# Patient Record
Sex: FEMALE | Race: WHITE | Employment: OTHER | URBAN - METROPOLITAN AREA
[De-identification: names, ages, dates, MRNs, and addresses within clinical notes are randomized per-mention and may not be internally consistent; named-entity substitution may affect disease eponyms.]

---

## 2017-01-16 ENCOUNTER — OFFICE VISIT (OUTPATIENT)
Dept: INTERNAL MEDICINE CLINIC | Age: 57
End: 2017-01-16

## 2017-01-16 VITALS
TEMPERATURE: 97.3 F | HEIGHT: 69 IN | SYSTOLIC BLOOD PRESSURE: 116 MMHG | HEART RATE: 72 BPM | OXYGEN SATURATION: 97 % | RESPIRATION RATE: 14 BRPM | DIASTOLIC BLOOD PRESSURE: 51 MMHG | WEIGHT: 261 LBS | BODY MASS INDEX: 38.66 KG/M2

## 2017-01-16 DIAGNOSIS — I10 ESSENTIAL HYPERTENSION WITH GOAL BLOOD PRESSURE LESS THAN 140/90: ICD-10-CM

## 2017-01-16 DIAGNOSIS — N18.9 ANEMIA IN CHRONIC KIDNEY DISEASE: ICD-10-CM

## 2017-01-16 DIAGNOSIS — E78.2 MIXED HYPERLIPIDEMIA: ICD-10-CM

## 2017-01-16 DIAGNOSIS — I49.5 SICK SINUS SYNDROME (HCC): ICD-10-CM

## 2017-01-16 DIAGNOSIS — N18.4 CKD (CHRONIC KIDNEY DISEASE), STAGE IV (HCC): ICD-10-CM

## 2017-01-16 DIAGNOSIS — I48.0 PAF (PAROXYSMAL ATRIAL FIBRILLATION) (HCC): ICD-10-CM

## 2017-01-16 DIAGNOSIS — D63.1 ANEMIA IN CHRONIC KIDNEY DISEASE: ICD-10-CM

## 2017-01-16 LAB
HBA1C MFR BLD HPLC: 7.7 % (ref 4.8–5.6)
INR BLD: 2.2 (ref 1–1.5)
PT POC: 26.5 SEC (ref 9.1–12)
VALID INTERNAL CONTROL?: YES

## 2017-01-16 NOTE — PROGRESS NOTES
HISTORY OF PRESENT ILLNESS  Radha Woods is a 61 y.o. female. HPI  DIABETES MELLITUS  She presents for follow up of diabetes mellitus complicated by retinopathy, neuropathy, and nephropathy, hypertension, hyperlipidemia, obesity, and sick sinus syndrome with pacemaker. She has not had follow-up with her nephrologist for almost a year. She hesitates to go back because lab work that he does is not paid for by insurance. Apparently part of the problem is that it duplicates lab work recently done by  [facility]. She has not been compliant with monthly pro time checks either. Diabetic ROS - medication compliance: compliant all of the time,      home glucose monitoring: Not checked very often, nonfasting values range <200,      further diabetic ROS: no polyuria or polydipsia, no numbness, tingling or pain in extremities, no hypoglycemia, no medication side effects noted, cannot feel feet, blurry vision in right eye due to a bleed. .   Cardiovascular ROS:  She complains of lower extremity edema. She denies palpitations, orthopnea, exertional chest pressure/discomfort, claudication, dyspnea on exertion, dizziness  Diet and Lifestyle: generally follows a low fat low cholesterol diet, generally follows a low sodium diet, follows a diabetic diet regularly, sedentary, nonsmoker  Home BP Monitoring: is not measured at home.     Patient Active Problem List   Diagnosis Code    Breast ca: f/b  [facility] C50.919    Asthma J45.909    Bleeding disorder ([facility] D68.9    Fe deficiency anemia D50.9    Atrial fibrillation, currently in sinus rhythm ([facility]) I48.91    Status post ablation of atrial fibrillation Z98.890, Z86.79    Morbid obesity ([facility]) E66.01    Sick sinus syndrome ([facility]) I49.5    S/P cardiac pacemaker procedure Z95.0    Long term (current) use of anticoagulants Z79.01    Mixed hyperlipidemia E78.2    Proliferative diabetic retinopathy ([facility] E11.3599    CKD (chronic kidney disease), stage IV ([facility]) N18.4    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 (HCC) R58.69    Type II diabetes mellitus with renal manifestations (HCC) Q15.75    Systolic murmur N21.8    CHOW (dyspnea on exertion) R06.09    Essential hypertension with goal blood pressure less than 140/90 I10    PAF (paroxysmal atrial fibrillation) ([facility]) I48.0    Anemia D64.9    Anemia in chronic kidney disease N18.9, D63.1     Past Medical History   Diagnosis Date    Acquired lymphedema      Right arm    Arrhythmia     Asthma     Atrial fibrillation (HCC)      Dr. Joshua Drew and Dr. Oziel Lundy Atrial flutter ([facility]     Cancer [facility])      bilateral breast    Chronic kidney disease     Diabetes mellitus type II     Dizziness     Essential hypertension     Hyperlipidemia     Hypertension     Long term (current) use of anticoagulants     [facility] 3/14/2012    Osteoarthritis     Pacemaker     Sick sinus syndrome [facility])      Past Surgical History   Procedure Laterality Date    Pr laminectomy,cervical  1994    Pr gastric bypass,obese<150cm emma-en-y  7/08     [facility]    Pr anesth,knee area surgery  1982 AND 1994    Hx cervical fusion  1994    Hx dilation and curettage  1992    Hx carpal tunnel release      Hx mastectomy  1998     RIGHT MODIFIED RADICAL     Ir insert tunl cvc w port over 5 years      Pr laser surgery of eye      Pr needle biopsy liver,w othr proc  8.21.07    Us guide asp ovarian cyst abd approach  8.21.07     RIGHT     Hx afib ablation      Hx pacemaker      Hx mohs procedure  1995     right    Hx orthopaedic       ight knee surgery    Pr abdomen surgery proc unlisted       gastric bypass     Social History     Social History    Marital status:      Spouse name: N/A    Number of children: N/A    Years of education: N/A     Social History Main Topics    Smoking status: Never Smoker    Smokeless tobacco: Never Used    Alcohol use Yes      Comment: rare    Drug use: No    Sexual activity: Not Asked     Other Topics Concern    None     Social History Narrative     Family History   Problem Relation Age of Onset    Cancer Mother     Heart Disease Mother     Alcohol abuse Father     Diabetes Brother     Hypertension Brother      Allergies   Allergen Reactions    Ace Inhibitors Cough    Amlodipine Swelling    Avapro [Irbesartan] Unable to Obtain    Bactrim [Sulfamethoxazole-Trimethoprim] Other (comments)     Sore mouth    Captopril Cough    Carvedilol Diarrhea    Contrast Agent [Iodine] Itching    Fish Containing Products Hives    Morphine Nausea and Vomiting and Swelling    Penicillins Hives    Pravachol [Pravastatin] Nausea Only    Seafood [Shellfish Containing Products] Hives    Singulair [Montelukast] Other (comments)     palpitations     Current Outpatient Prescriptions   Medication Sig Dispense Refill    warfarin (COUMADIN) 4 mg tablet TAKE ONE TABLET BY MOUTH DAILY ON FRIDAYS AND SUNDAYS, AND TAKE ONE-HALF TABLET DAILY ON ALL REMAINING DAYS OF THE WEEK 30 Tab 6    potassium chloride SR (KLOR-CON 10) 10 mEq tablet TAKE ONE TABLET BY MOUTH DAILY 30 Tab 4    doxazosin (CARDURA) 2 mg tablet TAKE ONE TABLET BY MOUTH ONCE NIGHTLY 30 Tab 9    bumetanide (BUMEX) 1 mg tablet TAKE ONE TABLET BY MOUTH TWICE A DAY (GENERIC FOR BUMEX) 60 Tab 6    cloNIDine HCl (CATAPRES) 0.3 mg tablet TAKE ONE TABLET BY MOUTH TWICE A DAY 60 Tab 10    Insulin Syringe-Needle U-100 1/2 mL 30 gauge x 5/16 syrg USE ONE SYRINGE AS NEEDED DAILY 100 Syringe 2    insulin glargine (LANTUS) 100 unit/mL injection Inject 15 units under the skin once daily 10 mL 11    metoprolol succinate (TOPROL-XL) 100 mg tablet Take 2 Tabs by mouth daily. 60 Tab 5    hydrALAZINE (APRESOLINE) 50 mg tablet TAKE ONE TABLET BY MOUTH THREE TIMES A DAY 90 Tab 5    metolazone (ZAROXOLYN) 5 mg tablet Take 1 Tab by mouth daily as needed (take if develop increased LE edema, weight gain > 5 pounds. ).  30 Tab 1    cholecalciferol, VITAMIN D3, (VITAMIN D3) 5,000 unit tab tablet Take 5,000 Units by mouth daily.  sodium bicarbonate 650 mg tablet Take 650 mg by mouth three (3) times daily.  vitamin A 8,000 unit capsule Take 8,000 Units by mouth daily.  ACETAMINOPHEN/DIPHENHYDRAMINE (TYLENOL PM PO) Take 2 Tabs by mouth nightly as needed.  insulin lispro (HUMALOG) 100 unit/mL kwikpen 2 units with dinner for sugars over 200 1 Package 0    Insulin Needles, Disposable, (NOVOFINE 30) 30 x 1/3 \" Use 1 needle daily for injections. 1 Package 11    cyanocobalamin (VITAMIN B-12) 1,000 mcg sublingual tablet Take 1,000 mcg by mouth daily.  calcium carbonate (TUMS) 200 mg calcium (500 mg) Chew Take 1 Tab by mouth three (3) times daily (after meals).  flintstones complete (FLINTSTONES COMPLETE) chewable tablet Take 1 Tab by mouth daily. Review of Systems   Constitutional: Positive for malaise/fatigue. Negative for weight loss. Gastrointestinal: Negative for constipation, diarrhea and heartburn. Musculoskeletal: Positive for joint pain (knees). Negative for back pain. Neurological: Positive for sensory change (feet). Negative for dizziness, tingling and focal weakness. Visit Vitals    /51 (BP 1 Location: Left arm, BP Patient Position: Sitting)    Pulse 72    Temp 97.3 °F (36.3 °C) (Oral)    Resp 14    Ht 5' 9\" (1.753 m)    Wt 261 lb (118.4 kg)    SpO2 97%    BMI 38.54 kg/m2     Physical Exam   Constitutional: She is oriented to person, place, and time. She appears well-developed and well-nourished. HENT:   Head: Normocephalic and atraumatic. Eyes: Conjunctivae are normal. Pupils are equal, round, and reactive to light. Neck: Neck supple. Carotid bruit is not present. No thyromegaly present. Cardiovascular: Normal rate, regular rhythm and normal heart sounds. PMI is not displaced. Exam reveals no gallop. No murmur heard.   Pulses:       Dorsalis pedis pulses are 2+ on the right side, and 2+ on the left side. Posterior tibial pulses are 2+ on the right side, and 2+ on the left side. Pulmonary/Chest: Effort normal. She has no wheezes. She has no rhonchi. She has no rales. Abdominal: Soft. Normal appearance. She exhibits no abdominal bruit and no mass. There is no hepatosplenomegaly. There is no tenderness. Musculoskeletal: She exhibits no edema. Lymphadenopathy:     She has no cervical adenopathy. Right: No supraclavicular adenopathy present. Left: No inguinal and no supraclavicular adenopathy present. Neurological: She is alert and oriented to person, place, and time. No sensory deficit. Skin: Skin is warm, dry and intact. No rash noted. Psychiatric: She has a normal mood and affect. Her behavior is normal.   Nursing note and vitals reviewed. Results for orders placed or performed in visit on 01/16/17   AMB POC HEMOGLOBIN A1C   Result Value Ref Range    Hemoglobin A1c (POC) 7.7 (A) 4.8 - 5.6 %   AMB POC PT/INR   Result Value Ref Range    VALID INTERNAL CONTROL POC Yes     Prothrombin time (POC) 26.5 (A) 9.1 - 12 sec    INR POC 2.2 (A) 1 - 1.5     Lab Results   Component Value Date/Time    Cholesterol, total 151 08/26/2016 11:26 AM    HDL Cholesterol 31 08/26/2016 11:26 AM    LDL, calculated 84 08/26/2016 11:26 AM    VLDL, calculated 36 08/26/2016 11:26 AM    Triglyceride 178 08/26/2016 11:26 AM       ASSESSMENT and PLAN    ICD-10-CM ICD-9-CM    1. Uncontrolled type 2 diabetes mellitus with stage 4 chronic kidney disease, with long-term current use of insulin ([facility]) E11.22 250.52 AMB POC HEMOGLOBIN A1C    E11.65 585.4     N18.4 V58.67     Z79.4     2. Essential hypertension with goal blood pressure less than 140/90 I10 401.9    3.  PAF (paroxysmal atrial fibrillation) ([facility]) I48.0 427.31 AMB POC PT/INR      PROTHROMBIN TIME + INR   4. CKD (chronic kidney disease), stage IV ([facility]) J35.1 850.7 METABOLIC PANEL, COMPREHENSIVE      CBC WITH AUTOMATED DIFF   5. Sick sinus syndrome (HCC) I49.5 427.81    6. Mixed hyperlipidemia Q76.6 819.7 METABOLIC PANEL, COMPREHENSIVE   7. Anemia in chronic kidney disease N18.9 285.21     D63.1 585.9          Uncontrolled type 2 diabetes mellitus with stage 4 chronic kidney disease, with long-term current use of insulin ([facility])  -     AMB POC HEMOGLOBIN A1C    Essential hypertension with goal blood pressure less than 140/90  Hypertension is controlled    PAF (paroxysmal atrial fibrillation) ([facility])  -     AMB POC PT/INR  -     PROTHROMBIN TIME + INR; Future    CKD (chronic kidney disease), stage IV ([facility])  -     METABOLIC PANEL, COMPREHENSIVE; Future  -     CBC WITH AUTOMATED DIFF; Future    Sick sinus syndrome ([facility]    Mixed hyperlipidemia  Hyperlipidemia is controlled  -     METABOLIC PANEL, COMPREHENSIVE; Future    Anemia in chronic kidney disease      Follow-up Disposition:  Return in about 4 months (around 5/16/2017) for DM, Chol, HTN   NON-fasting lab in one month (not on Friday). lab results and schedule of future lab studies reviewed with patient  reviewed diet, exercise and weight control  cardiovascular risk and specific lipid/LDL goals reviewed  Discussed the patient's above normal BMI with her. I have recommended the following interventions: encourage exercise  lifestyle education regarding diet . The BMI follow up plan is as follows: BMI is out of normal parameters and plan is as follows: I have counseled her on diet and exercise regimens  I have discussed the diagnosis with the patient and the intended plan as seen in the above orders. Patient is in agreement. The patient has received an after-visit summary and questions were answered concerning future plans. I have discussed medication side effects and warnings with the patient as well.

## 2017-01-16 NOTE — PATIENT INSTRUCTIONS
NON-fasting lab work in one month. Office visit in 4 months        Diabetes Foot Health: Care Instructions  Your Care Instructions    When you have diabetes, your feet need extra care and attention. Diabetes can damage the nerve endings and blood vessels in your feet, making you less likely to notice when your feet are injured. Diabetes also limits your body's ability to fight infection and get blood to areas that need it. If you get a minor foot injury, it could become an ulcer or a serious infection. With good foot care, you can prevent most of these problems. Caring for your feet can be quick and easy. Most of the care can be done when you are bathing or getting ready for bed. Follow-up care is a key part of your treatment and safety. Be sure to make and go to all appointments, and call your doctor if you are having problems. Its also a good idea to know your test results and keep a list of the medicines you take. How can you care for yourself at home? · Keep your blood sugar close to normal by watching what and how much you eat, monitoring blood sugar, taking medicines if prescribed, and getting regular exercise. · Do not smoke. Smoking affects blood flow and can make foot problems worse. If you need help quitting, talk to your doctor about stop-smoking programs and medicines. These can increase your chances of quitting for good. · Eat a diet that is low in fats. High fat intake can cause fat to build up in your blood vessels and decrease blood flow. · Inspect your feet daily for blisters, cuts, cracks, or sores. If you cannot see well, use a mirror or have someone help you. · Take care of your feet:  [facility] your feet every day. Use warm (not hot) water. Check the water temperature with your wrists or other part of your body, not your feet. ¨ Dry your feet well. Pat them dry. Do not rub the skin on your feet too hard. Dry well between your toes.  If the skin on your feet stays moist, bacteria or a fungus can grow, which can lead to infection. ¨ Keep your skin soft. Use moisturizing skin cream to keep the skin on your feet soft and prevent calluses and cracks. But do not put the cream between your toes, and stop using any cream that causes a rash. ¨ Clean underneath your toenails carefully. Do not use a sharp object to clean underneath your toenails. Use the blunt end of a nail file or other rounded tool. ¨ Trim and file your toenails straight across to prevent ingrown toenails. Use a nail clipper, not scissors. Use an emery board to smooth the edges. · Change socks daily. Socks without seams are best, because seams often rub the feet. You can find socks for people with diabetes from specialty catalogs. · Look inside your shoes every day for things like gravel or torn linings, which could cause blisters or sores. · Buy shoes that fit well:  ¨ Look for shoes that have plenty of space around the toes. This helps prevent bunions and blisters. ¨ Try on shoes while wearing the kind of socks you will usually wear with the shoes. ¨ Avoid plastic shoes. They may rub your feet and cause blisters. Good shoes should be made of materials that are flexible and breathable, such as leather or cloth. ¨ Break in new shoes slowly by wearing them for no more than an hour a day for several days. Take extra time to check your feet for red areas, blisters, or other problems after you wear new shoes. · Do not go barefoot. Do not wear sandals, and do not wear shoes with very thin soles. Thin soles are easy to puncture. They also do not protect your feet from hot pavement or cold weather. · Have your doctor check your feet during each visit. If you have a foot problem, see your doctor. Do not try to treat an early foot problem at home. Home remedies or treatments that you can buy without a prescription (such as corn removers) can be harmful. · Always get early treatment for foot problems.  A minor irritation can lead to a major problem if not properly cared for early. When should you call for help? Call your doctor now or seek immediate medical care if:  · You have a foot sore, an ulcer or break in the skin that is not healing after 4 days, bleeding corns or calluses, or an ingrown toenail. · You have blue or black areas, which can mean bruising or blood flow problems. · You have peeling skin or tiny blisters between your toes or cracking or oozing of the skin. · You have a fever for more than 24 hours and a foot sore. · You have new numbness or tingling in your feet that does not go away after you move your feet or change positions. · You have unexplained or unusual swelling of the foot or ankle. Watch closely for changes in your health, and be sure to contact your doctor if:  · You cannot do proper foot care. Where can you learn more? Go to http://dona-windy.info/. Enter A739 in the search box to learn more about \"Diabetes Foot Health: Care Instructions. \"  Current as of: May 23, 2016  Content Version: 11.1  © 7340-4766 [employer]. Care instructions adapted under license by [employer] (which disclaims liability or warranty for this information). If you have questions about a medical condition or this instruction, always ask your healthcare professional. [address] any warranty or liability for your use of this information.

## 2017-01-16 NOTE — PROGRESS NOTES
Reviewed record In preparation for visit and have obtained necessary documentation. Has info on advanced directive but has not filled them out. 1. Have you been to the ER, urgent care clinic or hospitalized since your last visit? No     2. Have you seen or consulted any other health care providers outside of the [facility] since your last visit? Include any pap smears or colon screening.  [facility]    Health Maintenance reviewed:

## 2017-01-16 NOTE — MR AVS SNAPSHOT
Visit Information Date & Time Provider Department Dept. Phone Encounter #  
 1/16/2017 10:30 AM Sunitha Quiñonez MD [facility] [phone] [id number] Follow-up Instructions Return in about 4 months (around 5/16/2017) for DM, Chol, HTN   NON-fasting lab in one month (not on Friday). Upcoming Health Maintenance Date Due Pneumococcal 19-64 Highest Risk (2 of 3 - PCV13) 11/11/2025* FOOT EXAM Q1 6/2/2017 HEMOGLOBIN A1C Q6M 7/16/2017 MICROALBUMIN Q1 7/19/2017 LIPID PANEL Q1 8/26/2017 PAP AKA CERVICAL CYTOLOGY 11/17/2017 EYE EXAM RETINAL OR DILATED Q1 12/27/2017 COLONOSCOPY 4/21/2020 DTaP/Tdap/Td series (2 - Td) 6/13/2026 *Topic was postponed. The date shown is not the original due date. Allergies as of 1/16/2017  Review Complete On: 1/16/2017 By: Sunitha Quiñonez MD  
  
 Severity Noted Reaction Type Reactions Ace Inhibitors  03/14/2012    Cough Amlodipine  01/02/2017    Swelling Avapro [Irbesartan]  03/14/2012    Unable to Obtain Bactrim [Sulfamethoxazole-trimethoprim]  04/13/2010    Other (comments) Sore mouth Captopril  04/13/2010    Cough Carvedilol  07/19/2016   Side Effect Diarrhea Contrast Agent [Iodine]  04/13/2010    Itching Fish Containing Products  10/29/2013    Hives Morphine  04/13/2010    Nausea and Vomiting, Swelling Penicillins  04/13/2010    Hives Pravachol [Pravastatin]  04/13/2010    Nausea Only Seafood [Shellfish Containing Products]  03/14/2012    Hives Singulair [Montelukast]  04/13/2010    Other (comments)  
 palpitations Current Immunizations  Reviewed on 1/16/2017 Name Date Influenza Vaccine 10/1/2016, 10/1/2015, 10/5/2014 Influenza Vaccine PF 11/1/2013  8:55 AM  
 Influenza Vaccine Split 11/7/2011  1:58 PM, 11/30/2010 Pneumococcal Vaccine (Unspecified Type) 10/10/2009 Tdap 6/13/2016 Reviewed by Franklin Smith LPN on 9/65/0520 at 42:55 AM  
You Were Diagnosed With   
  
 Codes Comments Type 2 diabetes mellitus with diabetic chronic kidney disease, unspecified CKD stage, unspecified long term insulin use status ([facility]    -  Primary ICD-10-CM: E11.22 
ICD-9-CM: 250.40, 585.9 Essential hypertension with goal blood pressure less than 140/90     ICD-10-CM: I10 
ICD-9-CM: 401.9 PAF (paroxysmal atrial fibrillation) (HCC)     ICD-10-CM: I48.0 ICD-9-CM: 427.31 CKD (chronic kidney disease), stage IV ([facility]     ICD-10-CM: N18.4 ICD-9-CM: 585.4 Sick sinus syndrome (HCC)     ICD-10-CM: I49.5 ICD-9-CM: 427.81 Mixed hyperlipidemia     ICD-10-CM: E78.2 ICD-9-CM: 272.2 Vitals BP Pulse Temp Resp Height(growth percentile) Weight(growth percentile) 116/51 (BP 1 Location: Left arm, BP Patient Position: Sitting) 72 97.3 °F (36.3 °C) (Oral) 14 5' 9\" (1.753 m) 261 lb (118.4 kg) SpO2 BMI OB Status Smoking Status 97% 38.54 kg/m2 Postmenopausal Never Smoker BMI and BSA Data Body Mass Index Body Surface Area 38.54 kg/m 2 2.4 m 2 Preferred Pharmacy Pharmacy Name Phone [facility] [address] , [address]  [phone] Your Updated Medication List  
  
   
This list is accurate as of: 1/16/17 11:29 AM.  Always use your most recent med list.  
  
  
  
  
 bumetanide 1 mg tablet Commonly known as:  Mary Jane Soha TAKE ONE TABLET BY MOUTH TWICE A DAY (GENERIC FOR BUMEX)  
  
 calcium carbonate 200 mg calcium (500 mg) Chew Commonly known as:  TUMS Take 1 Tab by mouth three (3) times daily (after meals). cholecalciferol (VITAMIN D3) 5,000 unit Tab tablet Commonly known as:  VITAMIN D3 Take 5,000 Units by mouth daily. cloNIDine HCl 0.3 mg tablet Commonly known as:  CATAPRES  
TAKE ONE TABLET BY MOUTH TWICE A DAY  
  
 cyanocobalamin 1,000 mcg sublingual tablet Commonly known as:  VITAMIN B-12  
 Take 1,000 mcg by mouth daily. doxazosin 2 mg tablet Commonly known as:  CARDURA TAKE ONE TABLET BY MOUTH ONCE NIGHTLY FLINTSTONES COMPLETE (IRON) chewable tablet Generic drug:  flintstones complete Take 1 Tab by mouth daily. hydrALAZINE 50 mg tablet Commonly known as:  APRESOLINE  
TAKE ONE TABLET BY MOUTH THREE TIMES A DAY  
  
 insulin glargine 100 unit/mL injection Commonly known as:  LANTUS Inject 15 units under the skin once daily  
  
 insulin lispro 100 unit/mL kwikpen Commonly known as:  HUMALOG  
2 units with dinner for sugars over 200 Insulin Needles (Disposable) 30 gauge x 1/3\" Commonly known as:  NOVOFINE 30  
Use 1 needle daily for injections. Insulin Syringe-Needle U-100 1/2 mL 30 gauge x 5/16 Syrg USE ONE SYRINGE AS NEEDED DAILY  
  
 metOLazone 5 mg tablet Commonly known as:  Rocio Jerson Take 1 Tab by mouth daily as needed (take if develop increased LE edema, weight gain > 5 pounds. ). metoprolol succinate 100 mg tablet Commonly known as:  TOPROL-XL Take 2 Tabs by mouth daily. potassium chloride SR 10 mEq tablet Commonly known as:  KLOR-CON 10  
TAKE ONE TABLET BY MOUTH DAILY  
  
 sodium bicarbonate 650 mg tablet Take 650 mg by mouth three (3) times daily. TYLENOL PM PO Take 2 Tabs by mouth nightly as needed. vitamin A 8,000 unit capsule Take 8,000 Units by mouth daily. warfarin 4 mg tablet Commonly known as:  COUMADIN  
TAKE ONE TABLET BY MOUTH DAILY ON FRIDAYS AND SUNDAYS, AND TAKE ONE-HALF TABLET DAILY ON ALL REMAINING DAYS OF THE WEEK We Performed the Following AMB POC HEMOGLOBIN A1C [30294 CPT(R)] AMB POC PT/INR [22021 CPT(R)] Follow-up Instructions Return in about 4 months (around 5/16/2017) for DM, Chol, HTN   NON-fasting lab in one month (not on Friday). To-Do List   
 02/16/2017 Lab:  CBC WITH AUTOMATED DIFF   
  
 02/16/2017 Lab: METABOLIC PANEL, COMPREHENSIVE   
  
 02/16/2017 Lab:  PROTHROMBIN TIME + INR Patient Instructions NON-fasting lab work in one month. Office visit in 4 months Diabetes Foot Health: Care Instructions Your Care Instructions When you have diabetes, your feet need extra care and attention. Diabetes can damage the nerve endings and blood vessels in your feet, making you less likely to notice when your feet are injured. Diabetes also limits your body's ability to fight infection and get blood to areas that need it. If you get a minor foot injury, it could become an ulcer or a serious infection. With good foot care, you can prevent most of these problems. Caring for your feet can be quick and easy. Most of the care can be done when you are bathing or getting ready for bed. Follow-up care is a key part of your treatment and safety. Be sure to make and go to all appointments, and call your doctor if you are having problems. Its also a good idea to know your test results and keep a list of the medicines you take. How can you care for yourself at home? · Keep your blood sugar close to normal by watching what and how much you eat, monitoring blood sugar, taking medicines if prescribed, and getting regular exercise. · Do not smoke. Smoking affects blood flow and can make foot problems worse. If you need help quitting, talk to your doctor about stop-smoking programs and medicines. These can increase your chances of quitting for good. · Eat a diet that is low in fats. High fat intake can cause fat to build up in your blood vessels and decrease blood flow. · Inspect your feet daily for blisters, cuts, cracks, or sores. If you cannot see well, use a mirror or have someone help you. · Take care of your feet: 
[facility] AUTHOR[facility] your feet every day. Use warm (not hot) water. Check the water temperature with your wrists or other part of your body, not your feet. ¨ Dry your feet well. Pat them dry. Do not rub the skin on your feet too hard. Dry well between your toes. If the skin on your feet stays moist, bacteria or a fungus can grow, which can lead to infection. ¨ Keep your skin soft. Use moisturizing skin cream to keep the skin on your feet soft and prevent calluses and cracks. But do not put the cream between your toes, and stop using any cream that causes a rash. ¨ Clean underneath your toenails carefully. Do not use a sharp object to clean underneath your toenails. Use the blunt end of a nail file or other rounded tool. ¨ Trim and file your toenails straight across to prevent ingrown toenails. Use a nail clipper, not scissors. Use an emery board to smooth the edges. · Change socks daily. Socks without seams are best, because seams often rub the feet. You can find socks for people with diabetes from specialty catalogs. · Look inside your shoes every day for things like gravel or torn linings, which could cause blisters or sores. · Buy shoes that fit well: 
¨ Look for shoes that have plenty of space around the toes. This helps prevent bunions and blisters. ¨ Try on shoes while wearing the kind of socks you will usually wear with the shoes. ¨ Avoid plastic shoes. They may rub your feet and cause blisters. Good shoes should be made of materials that are flexible and breathable, such as leather or cloth. ¨ Break in new shoes slowly by wearing them for no more than an hour a day for several days. Take extra time to check your feet for red areas, blisters, or other problems after you wear new shoes. · Do not go barefoot. Do not wear sandals, and do not wear shoes with very thin soles. Thin soles are easy to puncture. They also do not protect your feet from hot pavement or cold weather. · Have your doctor check your feet during each visit. If you have a foot problem, see your doctor.  Do not try to treat an early foot problem at home. Home remedies or treatments that you can buy without a prescription (such as corn removers) can be harmful. · Always get early treatment for foot problems. A minor irritation can lead to a major problem if not properly cared for early. When should you call for help? Call your doctor now or seek immediate medical care if: 
· You have a foot sore, an ulcer or break in the skin that is not healing after 4 days, bleeding corns or calluses, or an ingrown toenail. · You have blue or black areas, which can mean bruising or blood flow problems. · You have peeling skin or tiny blisters between your toes or cracking or oozing of the skin. · You have a fever for more than 24 hours and a foot sore. · You have new numbness or tingling in your feet that does not go away after you move your feet or change positions. · You have unexplained or unusual swelling of the foot or ankle. Watch closely for changes in your health, and be sure to contact your doctor if: 
· You cannot do proper foot care. Where can you learn more? Go to http://dona-windy.info/. Enter A739 in the search box to learn more about \"Diabetes Foot Health: Care Instructions. \" Current as of: May 23, 2016 Content Version: 11.1 © 5582-3924 [employer]. Care instructions adapted under license by [employer] (which disclaims liability or warranty for this information). If you have questions about a medical condition or this instruction, always ask your healthcare professional. [place] [address] any warranty or liability for your use of this information. Introducing [facility]! Salvador Guidry introduces [facility] patient portal. Now you can access parts of your medical record, email your doctor's office, and request medication refills online. 1. In your internet browser, go to [email or web address] 2. Click on the First Time User? Click Here link in the Sign In box. You will see the New Member Sign Up page. 3. Enter your Swatchcloud Access Code exactly as it appears below. You will not need to use this code after youve completed the sign-up process. If you do not sign up before the expiration date, you must request a new code. · Swatchcloud Access Code: [email or web address] Expires: 2/26/2017 10:13 AM 
 
4. Enter the last four digits of your Social Security Number (xxxx) and Date of Birth (mm/dd/yyyy) as indicated and click Submit. You will be taken to the next sign-up page. 5. Create a Swatchcloud ID. This will be your Swatchcloud login ID and cannot be changed, so think of one that is secure and easy to remember. 6. Create a Swatchcloud password. You can change your password at any time. 7. Enter your Password Reset Question and Answer. This can be used at a later time if you forget your password. 8. Enter your e-mail address. You will receive e-mail notification when new information is available in [address] 9. Click Sign Up. You can now view and download portions of your medical record. 10. Click the Download Summary menu link to download a portable copy of your medical information. If you have questions, please visit the Frequently Asked Questions section of the Swatchcloud website. Remember, Swatchcloud is NOT to be used for urgent needs. For medical emergencies, dial 911. Now available from your iPhone and Android! Please provide this summary of care documentation to your next provider. Your primary care clinician is listed as Pierce Delarosa. If you have any questions after today's visit, please call [phone].

## 2017-03-30 ENCOUNTER — TELEPHONE (OUTPATIENT)
Dept: INTERNAL MEDICINE CLINIC | Age: 57
End: 2017-03-30

## 2017-03-30 ENCOUNTER — OFFICE VISIT (OUTPATIENT)
Dept: INTERNAL MEDICINE CLINIC | Age: 57
End: 2017-03-30

## 2017-03-30 VITALS
OXYGEN SATURATION: 97 % | TEMPERATURE: 97.5 F | WEIGHT: 266 LBS | DIASTOLIC BLOOD PRESSURE: 99 MMHG | SYSTOLIC BLOOD PRESSURE: 200 MMHG | RESPIRATION RATE: 14 BRPM | HEART RATE: 66 BPM | BODY MASS INDEX: 39.4 KG/M2 | HEIGHT: 69 IN

## 2017-03-30 DIAGNOSIS — F41.1 ANXIETY AS ACUTE REACTION TO GROSS STRESS: Primary | ICD-10-CM

## 2017-03-30 DIAGNOSIS — F43.0 ANXIETY AS ACUTE REACTION TO GROSS STRESS: Primary | ICD-10-CM

## 2017-03-30 DIAGNOSIS — I48.0 PAF (PAROXYSMAL ATRIAL FIBRILLATION) (HCC): ICD-10-CM

## 2017-03-30 DIAGNOSIS — I10 ESSENTIAL HYPERTENSION WITH GOAL BLOOD PRESSURE LESS THAN 140/90: ICD-10-CM

## 2017-03-30 LAB
INR BLD: 2.8 (ref 1–1.5)
PT POC: 34 SEC (ref 9.1–12)
VALID INTERNAL CONTROL?: YES

## 2017-03-30 RX ORDER — WARFARIN 4 MG/1
TABLET ORAL
COMMUNITY
End: 2017-05-01 | Stop reason: SDUPTHER

## 2017-03-30 RX ORDER — BUSPIRONE HYDROCHLORIDE 10 MG/1
10 TABLET ORAL 3 TIMES DAILY
Qty: 60 TAB | Refills: 3 | Status: SHIPPED | OUTPATIENT
Start: 2017-03-30 | End: 2017-08-15 | Stop reason: SDUPTHER

## 2017-03-30 RX ORDER — SERTRALINE HYDROCHLORIDE 50 MG/1
TABLET, FILM COATED ORAL
Qty: 45 TAB | Refills: 5 | Status: SHIPPED | OUTPATIENT
Start: 2017-03-30 | End: 2017-09-01 | Stop reason: SDUPTHER

## 2017-03-30 NOTE — PATIENT INSTRUCTIONS
Start buspirone 10 mg twice daily. This should work fairly quickly. Start sertraline 50 mg tablet at a half tablet daily for 5 days. Then take a whole tablet daily for the next 5 days. If tolerated, take one and half tablets daily for 5 days. Then finally, 2 tablets daily. Anxiety Disorder: Care Instructions  Your Care Instructions  Anxiety is a normal reaction to stress. Difficult situations can cause you to have symptoms such as sweaty palms and a nervous feeling. In an anxiety disorder, the symptoms are far more severe. Constant worry, muscle tension, trouble sleeping, nausea and diarrhea, and other symptoms can make normal daily activities difficult or impossible. These symptoms may occur for no reason, and they can affect your work, school, or social life. Medicines, counseling, and self-care can all help. Follow-up care is a key part of your treatment and safety. Be sure to make and go to all appointments, and call your doctor if you are having problems. It's also a good idea to know your test results and keep a list of the medicines you take. How can you care for yourself at home? · Take medicines exactly as directed. Call your doctor if you think you are having a problem with your medicine. · Go to your counseling sessions and follow-up appointments. · Recognize and accept your anxiety. Then, when you are in a situation that makes you anxious, say to yourself, \"This is not an emergency. I feel uncomfortable, but I am not in danger. I can keep going even if I feel anxious. \"  · Be kind to your body:  ¨ Relieve tension with exercise or a massage. ¨ Get enough rest.  ¨ Avoid alcohol, caffeine, nicotine, and illegal drugs. They can increase your anxiety level and cause sleep problems. ¨ Learn and do relaxation techniques. See below for more about these techniques. · Engage your mind. Get out and do something you enjoy. Go to a funny movie, or take a walk or hike. Plan your day.  Having too much or too little to do can make you anxious. · Keep a record of your symptoms. Discuss your fears with a good friend or family member, or join a support group for people with similar problems. Talking to others sometimes relieves stress. · Get involved in social groups, or volunteer to help others. Being alone sometimes makes things seem worse than they are. · Get at least 30 minutes of exercise on most days of the week to relieve stress. Walking is a good choice. You also may want to do other activities, such as running, swimming, cycling, or playing tennis or team sports. Relaxation techniques  Do relaxation exercises 10 to 20 minutes a day. You can play soothing, relaxing music while you do them, if you wish. · Tell others in your house that you are going to do your relaxation exercises. Ask them not to disturb you. · Find a comfortable place, away from all distractions and noise. · Lie down on your back, or sit with your back straight. · Focus on your breathing. Make it slow and steady. · Breathe in through your nose. Breathe out through either your nose or mouth. · Breathe deeply, filling up the area between your navel and your rib cage. Breathe so that your belly goes up and down. · Do not hold your breath. · Breathe like this for 5 to 10 minutes. Notice the feeling of calmness throughout your whole body. As you continue to breathe slowly and deeply, relax by doing the following for another 5 to 10 minutes:  · Tighten and relax each muscle group in your body. You can begin at your toes and work your way up to your head. · Imagine your muscle groups relaxing and becoming heavy. · Empty your mind of all thoughts. · Let yourself relax more and more deeply. · Become aware of the state of calmness that surrounds you. · When your relaxation time is over, you can bring yourself back to alertness by moving your fingers and toes and then your hands and feet and then stretching and moving your entire body. Sometimes people fall asleep during relaxation, but they usually wake up shortly afterward. · Always give yourself time to return to full alertness before you drive a car or do anything that might cause an accident if you are not fully alert. Never play a relaxation tape while you drive a car. When should you call for help? Call 911 anytime you think you may need emergency care. For example, call if:  · You feel you cannot stop from hurting yourself or someone else. Keep the numbers for these national suicide hotlines: 4-919-028-TALK (8-986.156.8959) and 0-470-UGCJTEM (6-619.677.1279). If you or someone you know talks about suicide or feeling hopeless, get help right away. Watch closely for changes in your health, and be sure to contact your doctor if:  · You have anxiety or fear that affects your life. · You have symptoms of anxiety that are new or different from those you had before. Where can you learn more? Go to http://dona-windy.info/. Enter P754 in the search box to learn more about \"Anxiety Disorder: Care Instructions. \"  Current as of: July 26, 2016  Content Version: 11.2  © 7896-2448 [employer]. Care instructions adapted under license by [employer] (which disclaims liability or warranty for this information). If you have questions about a medical condition or this instruction, always ask your healthcare professional. [place] [address] any warranty or liability for your use of this information.

## 2017-03-30 NOTE — LETTER
NOTIFICATION RETURN TO WORK  
 
3/30/2017 3:08 PM 
 
Ms. Migdalia Glover [facility] [id number] To Whom It May Concern: 
 
Migdalia Glover is currently under the care of [facility]. She was seen in the office today She will return to work on: Friday March 31, 2017 If there are questions or concerns please have the patient contact our office.  
 
 
 
Sincerely, 
 
 
Brian Edgar MD

## 2017-03-30 NOTE — TELEPHONE ENCOUNTER
Patient called c/o very stress with personal issues and a lot of work related stress. Patient would like to come see dr Shanel Lang for help with these issues. Patient denies depression but feels very stressed and upset. Patient reported to [facility] she was upset and ready to hurt someone. Patient verbalized she would never actually harm anyone she is just upset. Patient denies SI/HI at this time. Patient advised to arrive by 215pm for appt today.

## 2017-03-30 NOTE — PROGRESS NOTES
Reviewed record In preparation for visit and have obtained necessary documentation. Has info on advanced directive but has not filled them out. 1. Have you been to the ER, urgent care clinic or hospitalized since your last visit? No     2. Have you seen or consulted any other health care providers outside of the [address] [place] [address] since your last visit? Include any pap smears or colon screening.  Eye dr    Health Maintenance reviewed:

## 2017-03-30 NOTE — PROGRESS NOTES
HISTORY OF PRESENT ILLNESS  Yara Garcia is a 61 y.o. female. HPI   She presents for follow up of anxiety. She has been under a lot of stress at work. 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 Her superiors want it done within a month. There is not time to do this given the facilities she has available. Until it is done, she has to do more work to make up for those who have not been trained. She feels trapped. Things are not any better at home. Her , to whom she has not been close for many years, is a hoarder. He has been fired from his job and is not searching for another; she is paying all of the bills. She would like to move out, but cannot do so right now. She has no refuge or place of peace. Ongoing symptoms include: insomnia, racing thoughts, psychomotor agitation, feelings of losing control, difficulty concentrating. Patient denies: palpitations, shortness of breath, suicidal ideation.    She is also here for Protime/INR     Patient Active Problem List   Diagnosis Code    Breast ca: f/b Dr Ly Gip C50.919    Asthma J45.909    Fe deficiency anemia D50.9    Atrial fibrillation, currently in sinus rhythm ([facility]) I48.91    Status post ablation of atrial fibrillation Z98.890, Z86.79    Morbid obesity ([facility]) E66.01    Sick sinus syndrome ([facility]) I49.5    S/P cardiac pacemaker procedure Z95.0    Long term (current) use of anticoagulants Z79.01    Mixed hyperlipidemia E78.2    Proliferative diabetic retinopathy ([facility] [address] E11.3599    CKD (chronic kidney disease), stage IV ([facility]) N18.4    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 ([facility]) C14.04    Systolic murmur Q84.0    CHOW (dyspnea on exertion) R06.09    Essential hypertension with goal blood pressure less than 140/90 I10    PAF (paroxysmal atrial fibrillation) ([facility]) I48.0    Anemia D64.9    Anemia in chronic kidney disease N18.9, D63.1    Uncontrolled type 2 diabetes mellitus with stage 4 chronic kidney disease, with long-term current use of insulin (HCC) E11.22, E11.65, N18.4, Z79.4     Past Medical History:   Diagnosis Date    Acquired lymphedema     Right arm    Arrhythmia     Asthma     Atrial fibrillation (HCC)     Dr. Cherelle Gray and Dr. Taylor Muñiz Atrial flutter ([facility]     Cancer [facility])     bilateral breast    Chronic kidney disease     Diabetes mellitus type II     Dizziness     Essential hypertension     Hyperlipidemia     Hypertension     Long term (current) use of anticoagulants     Morbid obesity ([facility] [address] [address] 3/14/2012    Osteoarthritis     Pacemaker     Sick sinus syndrome ([email or web address]      Past Surgical History:   Procedure Laterality Date    ABDOMEN SURGERY PROC UNLISTED      gastric bypass    GASTRIC BYPASS,OBESE<150CM [facility]EN[facility]Y  7/08    [facility]    HX AFIB ABLATION      HX CARPAL TUNNEL RELEASE      HX CERVICAL FUSION  1994    HX DILATION AND CURETTAGE  1992    HX MASTECTOMY  1998    RIGHT MODIFIED RADICAL     HX MOHS PROCEDURES  1995    right    HX ORTHOPAEDIC      ight knee surgery    HX PACEMAKER      IR INSERT TUNL CVC W PORT OVER 5 YEARS      Romel [address] OF EYE      NEEDLE BIOPSY LIVER,W OTHR [address]  8.21.07    PA Shan 9462 AND 1994    US GUIDE ASP OVARIAN CYST ABD APPROACH  8.21.07    RIGHT      Social History     Social History    Marital status:      Spouse name: N/A    Number of children: N/A    Years of education: N/A     Social History Main Topics    Smoking status: Never Smoker    Smokeless tobacco: Never Used    Alcohol use Yes      Comment: rare    Drug use: No    Sexual activity: Not Asked     Other Topics Concern    None     Social History Narrative     Family History   Problem Relation Age of Onset    Cancer Mother     Heart Disease Mother     Alcohol abuse Father     Diabetes Brother     Hypertension Brother      Allergies Allergen Reactions    Ace Inhibitors Cough    Amlodipine Swelling    Avapro [Irbesartan] Unable to Obtain    Bactrim [Sulfamethoxazole-Trimethoprim] Other (comments)     Sore mouth    Captopril Cough    Carvedilol Diarrhea    Contrast Agent [Iodine] Itching    Fish Containing Products Hives    Morphine Nausea and Vomiting and Swelling    Penicillins Hives    Pravachol [Pravastatin] Nausea Only    Seafood [Shellfish Containing Products] Hives    Singulair [Montelukast] Other (comments)     palpitations     Current Outpatient Prescriptions   Medication Sig Dispense Refill    warfarin (COUMADIN) 4 mg tablet 1 tablet Monday, wednesdays, fridays and 0.5 all other days.  hydrALAZINE (APRESOLINE) 50 mg tablet TAKE ONE TABLET BY MOUTH THREE TIMES A DAY 90 Tab 3    metoprolol succinate (TOPROL-XL) 100 mg tablet TAKE TWO TABLETS BY MOUTH DAILY 60 Tab 5    potassium chloride SR (KLOR-CON 10) 10 mEq tablet TAKE ONE TABLET BY MOUTH DAILY 30 Tab 4    doxazosin (CARDURA) 2 mg tablet TAKE ONE TABLET BY MOUTH ONCE NIGHTLY 30 Tab 9    bumetanide (BUMEX) 1 mg tablet TAKE ONE TABLET BY MOUTH TWICE A DAY (GENERIC FOR BUMEX) 60 Tab 6    cloNIDine HCl (CATAPRES) 0.3 mg tablet TAKE ONE TABLET BY MOUTH TWICE A DAY 60 Tab 10    Insulin Syringe-Needle U-100 1/2 mL 30 gauge x 5/16 syrg USE ONE SYRINGE AS NEEDED DAILY 100 Syringe 2    insulin glargine (LANTUS) 100 unit/mL injection Inject 15 units under the skin once daily 10 mL 11    metolazone (ZAROXOLYN) 5 mg tablet Take 1 Tab by mouth daily as needed (take if develop increased LE edema, weight gain > 5 pounds. ). 30 Tab 1    cholecalciferol, VITAMIN D3, (VITAMIN D3) 5,000 unit tab tablet Take 5,000 Units by mouth daily.  vitamin A 8,000 unit capsule Take 8,000 Units by mouth daily.  ACETAMINOPHEN/DIPHENHYDRAMINE (TYLENOL PM PO) Take 2 Tabs by mouth nightly as needed.       insulin lispro (HUMALOG) 100 unit/mL kwikpen 2 units with dinner for sugars over 200 1 Package 0    Insulin Needles, Disposable, (NOVOFINE 30) 30 x 1/3 \" Use 1 needle daily for injections. 1 Package 11    cyanocobalamin (VITAMIN B-12) 1,000 mcg sublingual tablet Take 1,000 mcg by mouth daily.  calcium carbonate (TUMS) 200 mg calcium (500 mg) Chew Take 1 Tab by mouth three (3) times daily (after meals).  flintstones complete (FLINTSTONES COMPLETE) chewable tablet Take 1 Tab by mouth daily.  sodium bicarbonate 650 mg tablet Take 650 mg by mouth three (3) times daily. ROS       Visit Vitals    BP (!) 200/99 (BP 1 Location: Left arm, BP Patient Position: Sitting)    Pulse 66    Temp 97.5 °F (36.4 °C) (Oral)    Resp 14    Ht 5' 9\" (1.753 m)    Wt 266 lb (120.7 kg)    SpO2 97%    BMI 39.28 kg/m2     Physical Exam   Constitutional: She is oriented to person, place, and time. She appears well-developed and well-nourished. HENT:   Head: Normocephalic and atraumatic. Eyes: Pupils are equal, round, and reactive to light. Neck: Neck supple. Cardiovascular: Normal rate, regular rhythm and normal heart sounds. Exam reveals no gallop. No murmur heard. Pulmonary/Chest: Effort normal and breath sounds normal. She has no wheezes. She has no rales. Musculoskeletal: She exhibits no edema. Neurological: She is alert and oriented to person, place, and time. Skin: Skin is warm and dry. No erythema. Psychiatric: Her speech is normal and behavior is normal. Judgment and thought content normal. Her mood appears anxious. Cognition and memory are normal.   Nursing note and vitals reviewed. Results for orders placed or performed in visit on 03/30/17   AMB POC PT/INR   Result Value Ref Range    VALID INTERNAL CONTROL POC Yes     Prothrombin time (POC) 34.0 (A) 9.1 - 12 sec    INR POC 2.8 (A) 1 - 1.5       ASSESSMENT and PLAN    ICD-10-CM ICD-9-CM    1.  Anxiety as acute reaction to gross stress F41.1 308.0 sertraline (ZOLOFT) 50 mg tablet    F43.0  busPIRone (BUSPAR) 10 mg tablet   2. PAF (paroxysmal atrial fibrillation) ([facility]) I48.0 427.31 AMB POC PT/INR      warfarin (COUMADIN) 4 mg tablet   3. Essential hypertension                       I10             401.9      Anxiety as acute reaction to gross stress  -     sertraline (ZOLOFT) 50 mg tablet; Take one and a half tablets daily  -     busPIRone (BUSPAR) 10 mg tablet; Take 1 Tab by mouth three (3) times daily. PAF (paroxysmal atrial fibrillation) ([facility])  -     AMB POC PT/INR    Essential hypertension  Blood pressure is uncharacteristically high and probably due to anxiety and agitation. Will recheck after starts medication for anxiety. Follow-up Disposition:  Return in about 1 month (around 4/30/2017) for Anxiety. lab results and schedule of future lab studies reviewed with patient  I have discussed the diagnosis with the patient and the intended plan as seen in the above orders. Patient is in agreement. The patient has received an after-visit summary and questions were answered concerning future plans. I have discussed medication side effects and warnings with the patient as well.

## 2017-03-30 NOTE — MR AVS SNAPSHOT
Visit Information Date & Time Provider Department Dept. Phone Encounter #  
 3/30/2017  2:30 PM MD Cha Parker [phone] [id number] Follow-up Instructions Return in about 1 month (around 4/30/2017) for Anxiety. Your Appointments 5/15/2017  3:45 PM  
LAB with LAB [facility] Cha Gray) Appt Note: Non Fasting Lab ([place])/$0CP KMP 1/16/17  
 [address] [address] [phone]  
  
   
 [facility]  
  
    
 5/22/2017  3:30 PM  
ROUTINE CARE with MD Cha Parker) Appt Note: dm, chol, htn, non-fasting lab prior/$25CP KMP 1/16/17  
 [address] [facility] [address] [phone]  
  
   
 [address] Upcoming Health Maintenance Date Due Pneumococcal 19-64 Highest Risk (2 of 3 - PCV13) 11/11/2025* FOOT EXAM Q1 6/2/2017 HEMOGLOBIN A1C Q6M 7/16/2017 MICROALBUMIN Q1 7/19/2017 LIPID PANEL Q1 8/26/2017 PAP AKA CERVICAL CYTOLOGY 11/17/2017 EYE EXAM RETINAL OR DILATED Q1 1/30/2018 COLONOSCOPY 4/21/2020 DTaP/Tdap/Td series (2 - Td) 6/13/2026 *Topic was postponed. The date shown is not the original due date. Allergies as of 3/30/2017  Review Complete On: 3/30/2017 By: Juana Santos MD  
  
 Severity Noted Reaction Type Reactions Ace Inhibitors  03/14/2012    Cough Amlodipine  01/02/2017    Swelling Avapro [Irbesartan]  03/14/2012    Unable to Obtain Bactrim [Sulfamethoxazole-trimethoprim]  04/13/2010    Other (comments) Sore mouth Captopril  04/13/2010    Cough Carvedilol  07/19/2016   Side Effect Diarrhea Contrast Agent [Iodine]  04/13/2010    Itching Fish Containing Products  10/29/2013    Hives Morphine  04/13/2010    Nausea and Vomiting, Swelling Penicillins  04/13/2010    Hives Pravachol [Pravastatin]  04/13/2010    Nausea Only Seafood [Shellfish Containing Products]  03/14/2012    Hives Singulair [Montelukast]  04/13/2010    Other (comments)  
 palpitations Current Immunizations  Reviewed on 3/30/2017 Name Date Influenza Vaccine 10/1/2016, 10/1/2015, 10/5/2014 Influenza Vaccine PF 11/1/2013  8:55 AM  
 Influenza Vaccine Split 11/7/2011  1:58 PM, 11/30/2010 Pneumococcal Vaccine (Unspecified Type) 10/10/2009 Tdap 6/13/2016 Reviewed by Fernando Vu LPN on 3/43/0467 at  2:15 PM  
You Were Diagnosed With   
  
 Codes Comments Anxiety disorder, unspecified    -  Primary ICD-10-CM: F41.9 ICD-9-CM: 300.00 PAF (paroxysmal atrial fibrillation) (HCC)     ICD-10-CM: I48.0 ICD-9-CM: 427.31 Vitals BP Pulse Temp Resp Height(growth percentile) Weight(growth percentile) (!) 200/99 (BP 1 Location: Left arm, BP Patient Position: Sitting) 66 97.5 °F (36.4 °C) (Oral) 14 5' 9\" (1.753 m) 266 lb (120.7 kg) SpO2 BMI OB Status Smoking Status 97% 39.28 kg/m2 Postmenopausal Never Smoker BMI and BSA Data Body Mass Index Body Surface Area  
 39.28 kg/m 2 2.42 m 2 Preferred Pharmacy Pharmacy Name Phone [facility] [address], [address]  [phone] Your Updated Medication List  
  
   
This list is accurate as of: 3/30/17  3:15 PM.  Always use your most recent med list.  
  
  
  
  
 bumetanide 1 mg tablet Commonly known as:  Fawn Sierras TAKE ONE TABLET BY MOUTH TWICE A DAY (GENERIC FOR Fawn Sierras) busPIRone 10 mg tablet Commonly known as:  BUSPAR Take 1 Tab by mouth three (3) times daily. calcium carbonate 200 mg calcium (500 mg) Chew Commonly known as:  TUMS Take 1 Tab by mouth three (3) times daily (after meals). cholecalciferol (VITAMIN D3) 5,000 unit Tab tablet Commonly known as:  VITAMIN D3  
 Take 5,000 Units by mouth daily. cloNIDine HCl 0.3 mg tablet Commonly known as:  CATAPRES  
TAKE ONE TABLET BY MOUTH TWICE A DAY  
  
 cyanocobalamin 1,000 mcg sublingual tablet Commonly known as:  VITAMIN B-12 Take 1,000 mcg by mouth daily. doxazosin 2 mg tablet Commonly known as:  CARDURA TAKE ONE TABLET BY MOUTH ONCE NIGHTLY FLINTSTONES COMPLETE (IRON) chewable tablet Generic drug:  flintstones complete Take 1 Tab by mouth daily. hydrALAZINE 50 mg tablet Commonly known as:  APRESOLINE  
TAKE ONE TABLET BY MOUTH THREE TIMES A DAY  
  
 insulin glargine 100 unit/mL injection Commonly known as:  LANTUS Inject 15 units under the skin once daily  
  
 insulin lispro 100 unit/mL kwikpen Commonly known as:  HUMALOG  
2 units with dinner for sugars over 200 Insulin Needles (Disposable) 30 gauge x 1/3\" Commonly known as:  NOVOFINE 30  
Use 1 needle daily for injections. Insulin Syringe-Needle U-100 1/2 mL 30 gauge x 5/16 Syrg USE ONE SYRINGE AS NEEDED DAILY  
  
 metOLazone 5 mg tablet Commonly known as:  Aleatha Blocker Take 1 Tab by mouth daily as needed (take if develop increased LE edema, weight gain > 5 pounds. ). metoprolol succinate 100 mg tablet Commonly known as:  TOPROL-XL  
TAKE TWO TABLETS BY MOUTH DAILY potassium chloride SR 10 mEq tablet Commonly known as:  KLOR-CON 10  
TAKE ONE TABLET BY MOUTH DAILY  
  
 sertraline 50 mg tablet Commonly known as:  ZOLOFT Take one and a half tablets daily  
  
 sodium bicarbonate 650 mg tablet Take 650 mg by mouth three (3) times daily. TYLENOL PM PO Take 2 Tabs by mouth nightly as needed. vitamin A 8,000 unit capsule Take 8,000 Units by mouth daily. warfarin 4 mg tablet Commonly known as:  COUMADIN  
1 tablet Monday, wednesdays, fridays and 0.5 all other days. Prescriptions Sent to Pharmacy  Refills  
 sertraline (ZOLOFT) 50 mg tablet 5  
 Sig: Take one and a half tablets daily Class: Normal  
 Pharmacy: [facility] [address] [place] [address] Rd, [address] Ph #: [phone]  
 busPIRone (BUSPAR) 10 mg tablet 3 Sig: Take 1 Tab by mouth three (3) times daily. Class: Normal  
 Pharmacy: [facility] [address] [facility] [address], [address] Ph #: [phone] Route: Oral  
  
We Performed the Following AMB POC PT/INR [36703 CPT(R)] Follow-up Instructions Return in about 1 month (around 4/30/2017) for Anxiety. Patient Instructions Start buspirone 10 mg twice daily. This should work fairly quickly. Start sertraline 50 mg tablet at a half tablet daily for 5 days. Then take a whole tablet daily for the next 5 days. If tolerated, take one and half tablets daily for 5 days. Then finally, 2 tablets daily. Anxiety Disorder: Care Instructions Your Care Instructions Anxiety is a normal reaction to stress. Difficult situations can cause you to have symptoms such as sweaty palms and a nervous feeling. In an anxiety disorder, the symptoms are far more severe. Constant worry, muscle tension, trouble sleeping, nausea and diarrhea, and other symptoms can make normal daily activities difficult or impossible. These symptoms may occur for no reason, and they can affect your work, school, or social life. Medicines, counseling, and self-care can all help. Follow-up care is a key part of your treatment and safety. Be sure to make and go to all appointments, and call your doctor if you are having problems. It's also a good idea to know your test results and keep a list of the medicines you take. How can you care for yourself at home? · Take medicines exactly as directed. Call your doctor if you think you are having a problem with your medicine. · Go to your counseling sessions and follow-up appointments. · Recognize and accept your anxiety.  Then, when you are in a situation that makes you anxious, say to yourself, \"This is not an emergency. I feel uncomfortable, but I am not in danger. I can keep going even if I feel anxious. \" · Be kind to your body: ¨ Relieve tension with exercise or a massage. ¨ Get enough rest. 
¨ Avoid alcohol, caffeine, nicotine, and illegal drugs. They can increase your anxiety level and cause sleep problems. ¨ Learn and do relaxation techniques. See below for more about these techniques. · Engage your mind. Get out and do something you enjoy. Go to a [employer] movie, or take a walk or hike. Plan your day. Having too much or too little to do can make you anxious. · Keep a record of your symptoms. Discuss your fears with a good friend or family member, or join a support group for people with similar problems. Talking to others sometimes relieves stress. · Get involved in social groups, or volunteer to help others. Being alone sometimes makes things seem worse than they are. · Get at least 30 minutes of exercise on most days of the week to relieve stress. Walking is a good choice. You also may want to do other activities, such as running, swimming, cycling, or playing tennis or team sports. Relaxation techniques Do relaxation exercises 10 to 20 minutes a day. You can play soothing, relaxing music while you do them, if you wish. · Tell others in your house that you are going to do your relaxation exercises. Ask them not to disturb you. · Find a comfortable place, away from all distractions and noise. · Lie down on your back, or sit with your back straight. · Focus on your breathing. Make it slow and steady. · Breathe in through your nose. Breathe out through either your nose or mouth. · Breathe deeply, filling up the area between your navel and your rib cage. Breathe so that your belly goes up and down. · Do not hold your breath. · Breathe like this for 5 to 10 minutes. Notice the feeling of calmness throughout your whole body. As you continue to breathe slowly and deeply, relax by doing the following for another 5 to 10 minutes: · Tighten and relax each muscle group in your body. You can begin at your toes and work your way up to your head. · Imagine your muscle groups relaxing and becoming heavy. · Empty your mind of all thoughts. · Let yourself relax more and more deeply. · Become aware of the state of calmness that surrounds you. · When your relaxation time is over, you can bring yourself back to alertness by moving your fingers and toes and then your hands and feet and then stretching and moving your entire body. Sometimes people fall asleep during relaxation, but they usually wake up shortly afterward. · Always give yourself time to return to full alertness before you drive a car or do anything that might cause an accident if you are not fully alert. Never play a relaxation tape while you drive a car. When should you call for help? Call 911 anytime you think you may need emergency care. For example, call if: 
· You feel you cannot stop from hurting yourself or someone else. Keep the numbers for these national suicide hotlines: 3-149-736-TALK (1-683-182-117.181.5687) and 3-173-OVEGUKO (9-521.509.6785). If you or someone you know talks about suicide or feeling hopeless, get help right away. Watch closely for changes in your health, and be sure to contact your doctor if: 
· You have anxiety or fear that affects your life. · You have symptoms of anxiety that are new or different from those you had before. Where can you learn more? Go to http://dona-windy.info/. Enter P754 in the search box to learn more about \"Anxiety Disorder: Care Instructions. \" Current as of: July 26, 2016 Content Version: 11.2 © 1350-1713 [employer], [employer].  Care instructions adapted under license by [employer] (which disclaims liability or warranty for this information). If you have questions about a medical condition or this instruction, always ask your healthcare professional. [address] any warranty or liability for your use of this information. Introducing [facility]! Emre Jose introduces [facility] patient portal. Now you can access parts of your medical record, email your doctor's office, and request medication refills online. 1. In your internet browser, go to [email or web address] 2. Click on the First Time User? Click Here link in the Sign In box. You will see the New Member Sign Up page. 3. Enter your [facility] Access Code exactly as it appears below. You will not need to use this code after youve completed the sign-up process. If you do not sign up before the expiration date, you must request a new code. · [facility] Access Code: [email or web address] Expires: 6/28/2017  3:15 PM 
 
4. Enter the last four digits of your Social Security Number (xxxx) and Date of Birth (mm/dd/yyyy) as indicated and click Submit. You will be taken to the next sign-up page. 5. Create a [facility] ID. This will be your [facility] login ID and cannot be changed, so think of one that is secure and easy to remember. 6. Create a [facility] password. You can change your password at any time. 7. Enter your Password Reset Question and Answer. This can be used at a later time if you forget your password. 8. Enter your e-mail address. You will receive e-mail notification when new information is available in [address] 9. Click Sign Up. You can now view and download portions of your medical record. 10. Click the Download Summary menu link to download a portable copy of your medical information. If you have questions, please visit the Frequently Asked Questions section of the [facility] website. Remember, [facility] is NOT to be used for urgent needs. For medical emergencies, dial 911. Now available from your iPhone and Android! Please provide this summary of care documentation to your next provider. Your primary care clinician is listed as Ewa Webber. If you have any questions after today's visit, please call [phone].

## 2017-05-01 ENCOUNTER — OFFICE VISIT (OUTPATIENT)
Dept: INTERNAL MEDICINE CLINIC | Age: 57
End: 2017-05-01

## 2017-05-01 VITALS
BODY MASS INDEX: 39.25 KG/M2 | RESPIRATION RATE: 14 BRPM | HEIGHT: 69 IN | OXYGEN SATURATION: 95 % | SYSTOLIC BLOOD PRESSURE: 150 MMHG | TEMPERATURE: 98.1 F | DIASTOLIC BLOOD PRESSURE: 66 MMHG | WEIGHT: 265 LBS | HEART RATE: 65 BPM

## 2017-05-01 DIAGNOSIS — Z01.818 PREOP GENERAL PHYSICAL EXAM: Primary | ICD-10-CM

## 2017-05-01 DIAGNOSIS — F41.1 ANXIETY AS ACUTE REACTION TO GROSS STRESS: ICD-10-CM

## 2017-05-01 DIAGNOSIS — F43.0 ANXIETY AS ACUTE REACTION TO GROSS STRESS: ICD-10-CM

## 2017-05-01 DIAGNOSIS — I48.0 PAF (PAROXYSMAL ATRIAL FIBRILLATION) (HCC): ICD-10-CM

## 2017-05-01 DIAGNOSIS — I49.5 SICK SINUS SYNDROME (HCC): ICD-10-CM

## 2017-05-01 DIAGNOSIS — I10 ESSENTIAL HYPERTENSION WITH GOAL BLOOD PRESSURE LESS THAN 140/90: ICD-10-CM

## 2017-05-01 DIAGNOSIS — H25.9 AGE-RELATED CATARACT OF BOTH EYES, UNSPECIFIED AGE-RELATED CATARACT TYPE: ICD-10-CM

## 2017-05-01 PROBLEM — E11.9 DIABETES MELLITUS (HCC): Status: ACTIVE | Noted: 2017-01-02

## 2017-05-01 LAB
INR BLD: 4.5 (ref 1–1.5)
PT POC: 54.2 SEC (ref 9.1–12)
VALID INTERNAL CONTROL?: YES

## 2017-05-01 RX ORDER — WARFARIN 4 MG/1
TABLET ORAL
Qty: 30 TAB | Refills: 5
Start: 2017-05-01 | End: 2017-08-17 | Stop reason: SDUPTHER

## 2017-05-01 NOTE — PATIENT INSTRUCTIONS
Warfarin 4 mg tablets: Take none today and none tomorrow, then 1 tablet Fridays and 0.5 tablets all other days. Repeat Protime/INR in about 10 days. Keep appointment on 5/22         Taking Warfarin Safely: Care Instructions  Your Care Instructions  Warfarin is a medicine that you take to prevent blood clots. It is often called a blood thinner. Doctors give warfarin (such as Coumadin) to reduce the risk of blood clots. You may be at risk for blood clots if you have atrial fibrillation or deep vein thrombosis. Some other health problems may also put you at risk. Warfarin slows the amount of time it takes for your blood to clot. It can cause bleeding problems. Even if you've been taking warfarin for a while, it's important to know how to take it safely. Foods and other medicines can affect the way warfarin works. Some can make warfarin work too well. This can cause bleeding problems. And some can make it work poorly, so that it does not prevent blood clots very well. You will need regular blood tests to check how long it takes for your blood to form a clot. This test is called a PT or prothrombin time test. The result of the test is called an INR level. Depending on the test results, your doctor or anticoagulation clinic may adjust your dose of warfarin. Follow-up care is a key part of your treatment and safety. Be sure to make and go to all appointments, and call your doctor if you are having problems. It's also a good idea to know your test results and keep a list of the medicines you take. How can you care for yourself at home? Take warfarin safely  · Take your warfarin at the same time each day. · If you miss a dose of warfarin, don't take an extra dose to make up for it. Your doctor can tell you exactly what to do so you don't take too much or too little. · Wear medical alert jewelry that lets others know that you take warfarin. You can buy this at most drugstores.   · Don't take warfarin if you are pregnant or planning to get pregnant. Talk to your doctor about how you can prevent getting pregnant while you are taking it. · Don't change your dose or stop taking warfarin unless your doctor tells you to. Effects of medicines and food on warfarin  · Don't start or stop taking any medicines, vitamins, or natural remedies unless you first talk to your doctor. Many medicines can affect how warfarin works. These include aspirin and other pain relievers, over-the-counter medicines, multivitamins, dietary supplements, and herbal products. · Tell all of your doctors and pharmacists that you take warfarin. Some prescription medicines can affect how warfarin works. · Keep the amount of vitamin K in your diet about the same from day to day. Do not suddenly eat a lot more or a lot less food that is rich in vitamin K than you usually do. Vitamin K affects how warfarin works and how your blood clots. Talk with your doctor before making big changes in your diet. Foods that have a lot of vitamin K include cooked green vegetables, such as:  ¨ Kale, spinach, turnip greens, jae greens, Swiss chard, and mustard greens. ¨ [place] sprouts, broccoli, and asparagus. · Limit your use of alcohol. Avoid bleeding by preventing falls and injuries  · Wear slippers or shoes with nonskid soles. · Remove throw rugs and clutter. · Rearrange furniture and electrical cords to keep them out of walking paths. · Keep stairways, porches, and outside walkways well lit. Use night-lights in hallways and bathrooms. · Be extra careful when you work with sharp tools or knives. When should you call for help? Call 911 anytime you think you may need emergency care. For example, call if:  · You have a sudden, severe headache that is different from past headaches. Call your doctor now or seek immediate medical care if:  · You have any abnormal bleeding, such as:  ¨ Nosebleeds.   ¨ Vaginal bleeding that is different (heavier, more frequent, at a different time of the month) than what you are used to. ¨ Bloody or black stools, or rectal bleeding. ¨ Bloody or pink urine. Watch closely for changes in your health, and be sure to contact your doctor if you have any problems. Where can you learn more? Go to http://dona-windy.info/. Enter D378 in the search box to learn more about \"Taking Warfarin Safely: Care Instructions. \"  Current as of: November 15, 2016  Content Version: 11.2  © 6557-0856 [employer]. Care instructions adapted under license by [employer] (which disclaims liability or warranty for this information). If you have questions about a medical condition or this instruction, always ask your healthcare professional. [address] any warranty or liability for your use of this information.

## 2017-05-01 NOTE — PROGRESS NOTES
Reviewed record In preparation for visit and have obtained necessary documentation. Has info on advanced directive but has not filled them out. 1. Have you been to the ER, urgent care clinic or hospitalized since your last visit? No     2. Have you seen or consulted any other health care providers outside of the [address] [place] [address] since your last visit? Include any pap smears or colon screening.  Dr Guanaco Lopez Maintenance reviewed:

## 2017-05-01 NOTE — PROGRESS NOTES
HISTORY OF PRESENT ILLNESS  Nolan Hobbs is a 61 y.o. female. HPI Presents at request of Dr Steve Coleman for evaluation of medical problems prior to bilateral cataract surgery planned for May 23 (right eye) and June 27 (left eye). She has diabetes mellitus complicated by retinopathy, neuropathy, and nephropathy; Hgb A1c in January was 7.7. Most recent creatinine was 2.17. She has hypertension; blood pressures usually run in the 120's/70's. She has hyperlipidemia; lab in August showed HDL 31, LDL 84, triglyceride 178. She has sick sinus syndrome with pacemaker. She has a history of paroxysmal atrial fibrillation, and takes warfarin. She is here for follow-up prothrombin time/INR. She has a follow up visit for these chronic problems on May 22. She presents for follow up of anxiety. A month ago we started sertraline currently at 75 mg daily. We also started buspirone 10 mg twice a day. She is tolerating both with no side effects. With the addition of these medications she is doing much better. Patient denies: racing thoughts, psychomotor agitation, feelings of losing control, difficulty concentrating.      Patient Active Problem List   Diagnosis Code    Breast ca: f/b Dr Torie Trejo C50.919    Asthma J45.909    Fe deficiency anemia D50.9    Atrial fibrillation, currently in sinus rhythm ([facility]) Z86.79    Status post ablation of atrial fibrillation Z98.890, Z86.79    Morbid obesity ([facility]) E66.01    Sick sinus syndrome ([facility]) I49.5    S/P cardiac pacemaker procedure Z95.0    Long term (current) use of anticoagulants Z79.01    Mixed hyperlipidemia E78.2    Proliferative diabetic retinopathy ([facility] E11.3599    CKD (chronic kidney disease), stage IV ([facility]) N18.4    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 ([facility]) T13.72    Systolic murmur V76.3    CHOW (dyspnea on exertion) R06.09    Essential hypertension with goal blood pressure less than 140/90 I10    PAF (paroxysmal atrial fibrillation) (HCC) I48.0    Anemia D64.9    Anemia in chronic kidney disease N18.9, D63.1    Uncontrolled type 2 diabetes mellitus with stage 4 chronic kidney disease, with long-term current use of insulin (HCC) E11.22, E11.65, N18.4, Z79.4     Past Medical History:   Diagnosis Date    Acquired lymphedema     Right arm    Arrhythmia     Asthma     Atrial fibrillation (HCC)     Dr. Shaylee Garza and Dr. Bart Kern Atrial flutter ([facility]     Cancer [facility])     bilateral breast    Chronic kidney disease     Diabetes mellitus type II     Dizziness     Essential hypertension     Hyperlipidemia     Hypertension     Long term (current) use of anticoagulants     Morbid obesity ([facility] 3/14/2012    Osteoarthritis     Pacemaker     Sick sinus syndrome ([facility]      Past Surgical History:   Procedure Laterality Date    ABDOMEN SURGERY PROC UNLISTED      gastric bypass    GASTRIC BYPASS,OBESE<150CM SAW-EN-Y  7/08    [facility]    HX AFIB ABLATION      HX CARPAL TUNNEL RELEASE      HX CERVICAL FUSION  1994    HX DILATION AND CURETTAGE  1992    HX MASTECTOMY  1998    RIGHT MODIFIED RADICAL     HX MOHS PROCEDURES  1995    right    HX ORTHOPAEDIC      ight knee surgery    HX PACEMAKER      IR INSERT TUNL CVC W PORT OVER 5 YEARS      [address] OF EYE      NEEDLE BIOPSY LIVER,W OTHR [address]  8.21.07    [address] Shan [address] AND 1994    US GUIDE ASP OVARIAN CYST ABD APPROACH  8.21.07    RIGHT      Social History     Social History    Marital status:      Spouse name: N/A    Number of children: N/A    Years of education: N/A     Social History Main Topics    Smoking status: Never Smoker    Smokeless tobacco: Never Used    Alcohol use Yes      Comment: rare    Drug use: No    Sexual activity: Not Asked     Other Topics Concern    None     Social History Narrative     Family History   Problem Relation Age of Onset    Cancer Mother    [facility] Heart Disease Mother     Alcohol abuse Father     Diabetes Brother     Hypertension Brother      Allergies   Allergen Reactions    Ace Inhibitors Cough    Amlodipine Swelling    Avapro [Irbesartan] Unable to Obtain    Bactrim [Sulfamethoxazole-Trimethoprim] Other (comments)     Sore mouth    Captopril Cough    Carvedilol Diarrhea    Contrast Agent [Iodine] Itching    Fish Containing Products Hives    Morphine Nausea and Vomiting and Swelling    Penicillins Hives    Pravachol [Pravastatin] Nausea Only    Seafood [Shellfish Containing Products] Hives    Singulair [Montelukast] Other (comments)     palpitations     Current Outpatient Prescriptions   Medication Sig Dispense Refill    warfarin (COUMADIN) 4 mg tablet 1 tablet Monday, wednesdays, fridays and 0.5 all other days.  sertraline (ZOLOFT) 50 mg tablet Take one and a half tablets daily 45 Tab 5    busPIRone (BUSPAR) 10 mg tablet Take 1 Tab by mouth three (3) times daily. 60 Tab 3    hydrALAZINE (APRESOLINE) 50 mg tablet TAKE ONE TABLET BY MOUTH THREE TIMES A DAY 90 Tab 3    metoprolol succinate (TOPROL-XL) 100 mg tablet TAKE TWO TABLETS BY MOUTH DAILY 60 Tab 5    potassium chloride SR (KLOR-CON 10) 10 mEq tablet TAKE ONE TABLET BY MOUTH DAILY 30 Tab 4    doxazosin (CARDURA) 2 mg tablet TAKE ONE TABLET BY MOUTH ONCE NIGHTLY 30 Tab 9    bumetanide (BUMEX) 1 mg tablet TAKE ONE TABLET BY MOUTH TWICE A DAY (GENERIC FOR BUMEX) 60 Tab 6    cloNIDine HCl (CATAPRES) 0.3 mg tablet TAKE ONE TABLET BY MOUTH TWICE A DAY 60 Tab 10    Insulin Syringe-Needle U-100 1/2 mL 30 gauge x 5/16 syrg USE ONE SYRINGE AS NEEDED DAILY 100 Syringe 2    insulin glargine (LANTUS) 100 unit/mL injection Inject 15 units under the skin once daily 10 mL 11    metolazone (ZAROXOLYN) 5 mg tablet Take 1 Tab by mouth daily as needed (take if develop increased LE edema, weight gain > 5 pounds. ).  30 Tab 1    cholecalciferol, VITAMIN D3, (VITAMIN D3) 5,000 unit tab tablet Take 5,000 Units by mouth daily.  vitamin A 8,000 unit capsule Take 8,000 Units by mouth daily.  ACETAMINOPHEN/DIPHENHYDRAMINE (TYLENOL PM PO) Take 2 Tabs by mouth nightly as needed.  insulin lispro (HUMALOG) 100 unit/mL kwikpen 2 units with dinner for sugars over 200 1 Package 0    Insulin Needles, Disposable, (NOVOFINE 30) 30 x 1/3 \" Use 1 needle daily for injections. 1 Package 11    cyanocobalamin (VITAMIN B-12) 1,000 mcg sublingual tablet Take 1,000 mcg by mouth daily.  calcium carbonate (TUMS) 200 mg calcium (500 mg) Chew Take 1 Tab by mouth three (3) times daily (after meals).  flintstones complete (FLINTSTONES COMPLETE) chewable tablet Take 1 Tab by mouth daily. Review of Systems   Constitutional: Negative for malaise/fatigue and weight loss. HENT: Positive for congestion (rom pollen). Respiratory: Negative for cough and shortness of breath. Cardiovascular: Positive for leg swelling (no worse than ususal. ). Negative for chest pain, palpitations, orthopnea and claudication. Gastrointestinal: Negative for constipation, diarrhea and heartburn. Genitourinary: Negative for frequency and urgency. Musculoskeletal: Positive for joint pain (knees). Negative for back pain. Neurological: Negative for dizziness, tingling, focal weakness and headaches. Psychiatric/Behavioral: Negative for depression. The patient is not nervous/anxious. Visit Vitals    /66 (BP 1 Location: Left arm, BP Patient Position: Sitting)    Pulse 65    Temp 98.1 °F (36.7 °C) (Oral)    Resp 14    Ht 5' 9\" (1.753 m)    Wt 265 lb (120.2 kg)    SpO2 95%    BMI 39.13 kg/m2     Physical Exam   Constitutional: She is oriented to person, place, and time. She appears well-developed and well-nourished. HENT:   Head: Normocephalic and atraumatic. Eyes: Conjunctivae are normal. Pupils are equal, round, and reactive to light. Neck: Neck supple. Carotid bruit is not present. No thyromegaly present. Cardiovascular: Normal rate, regular rhythm and normal heart sounds. PMI is not displaced. Exam reveals no gallop. No murmur heard. Pulses:       Dorsalis pedis pulses are 2+ on the right side, and 2+ on the left side. Posterior tibial pulses are 2+ on the right side, and 2+ on the left side. Pulmonary/Chest: Effort normal. She has no wheezes. She has no rhonchi. She has no rales. Abdominal: Soft. Normal appearance. She exhibits no abdominal bruit and no mass. There is no hepatosplenomegaly. There is no tenderness. Musculoskeletal: She exhibits edema (2 + at ankles). Lymphadenopathy:     She has no cervical adenopathy. Right: No supraclavicular adenopathy present. Left: No inguinal and no supraclavicular adenopathy present. Neurological: She is alert and oriented to person, place, and time. No sensory deficit. Skin: Skin is warm, dry and intact. No rash noted. Psychiatric: She has a normal mood and affect. Her behavior is normal.   Nursing note and vitals reviewed. Results for orders placed or performed in visit on 05/01/17   AMB POC PT/INR   Result Value Ref Range    VALID INTERNAL CONTROL POC Yes     Prothrombin time (POC) 54.2 (A) 9.1 - 12 sec    INR POC 4.5 (A) 1 - 1.5       ASSESSMENT and PLAN    ICD-10-CM ICD-9-CM    1. Preop general physical exam Z01.818 V72.83    2. Age-related cataract of both eyes, unspecified age-related cataract type H25.9 366.10    3. Anxiety as acute reaction to gross stress F41.1 308.0     F43.0     4. Uncontrolled type 2 diabetes mellitus with stage 4 chronic kidney disease, with long-term current use of insulin ([facility]) E11.22 250.52     E11.65 585.4     N18.4 V58.67     Z79.4     5. PAF (paroxysmal atrial fibrillation) ([facility]) I48.0 427.31 AMB POC PT/INR      warfarin (COUMADIN) 4 mg tablet      PROTHROMBIN TIME + INR   6. Essential hypertension with goal blood pressure less than 140/90 I10 401.9    7.  Sick sinus syndrome ([facility] I49.5 427.81    8. Obesity, Class II, BMI 35-39.9, with comorbidity ([facility]) E66.9 278.00          Preop general physical exam  Condition is optimized for surgery, with the exception of prolonged INR. We should be able to achieve a normal therapeutic level by the date of her first surgery. There is generally no need to withhold warfarin for cataract surgery, unless there is a particular reason to do so for this patient. Age-related cataract of both eyes, unspecified age-related cataract type    Anxiety as acute reaction to gross stress  Anxiety is greatly improved and under control. Uncontrolled type 2 diabetes mellitus with stage 4 chronic kidney disease, with long-term current use of insulin ([facility])  Diabetes controlled certainly could be better, but with a hemoglobin A1c of 7.7, there is no contraindication to proceeding with eye surgery. PAF (paroxysmal atrial fibrillation) ([facility])  -     AMB POC PT/INR  -     warfarin (COUMADIN) 4 mg tablet; none for 2 days, then 1 tablet Fridays and 0.5 tablets all other days.  -     PROTHROMBIN TIME + INR; Future    Essential hypertension with goal blood pressure less than 140/90  Blood pressure is uncharacteristically elevated today, although certainly better than a month ago when she was in the process of acute anxiety. If not in the normal range when we see her later this month, will need to adjust medication. Sick sinus syndrome (HCC)  Stable with pacemaker. Obesity, Class II, BMI 35-39.9, with comorbidity ([facility]      Follow-up Disposition:  Return in about 10 days (around 5/11/2017) for lab only non-fasting (Not on Friday)  [place] 5/11. Keep appointment on 5/22.  lab results and schedule of future lab studies reviewed with patient  reviewed diet, exercise and weight control  Discussed the patient's BMI with her.   The BMI follow up plan is as follows: I have counseled this patient on diet and exercise regimens  I have discussed the diagnosis with the patient and the intended plan as seen in the above orders. Patient is in agreement. The patient has received an after-visit summary and questions were answered concerning future plans. I have discussed medication side effects and warnings with the patient as well.

## 2017-05-01 NOTE — MR AVS SNAPSHOT
Visit Information Date & Time Provider Department Dept. Phone Encounter #  
 5/1/2017  3:00 PM Tl [facility] [phone] [id number] Follow-up Instructions Return in about 10 days (around 5/11/2017) for lab only non-fasting (Not on Friday)  [facility] 5/11. Your Appointments 5/11/2017  2:30 PM  
ACUTE CARE with MD Tl [facility]) Appt Note: Pre-op appointment / Surgery 04.23.17 /  
 [address] [facility] [address] [address] [phone]  
  
   
 8 [facility] [address] 5/15/2017  3:45 PM  
LAB with LAB [facility]) Appt Note: Non Fasting Lab ([facility])/$0CP KMP 1/16/17  
 [address] [facility] [address] [phone]  
  
   
 [address] [facility]  
  
    
 5/22/2017  3:30 PM  
ROUTINE CARE with MD Tl Ogden [facility]) Appt Note: dm, chol, htn, non-fasting lab prior/$25CP KMP 1/16/17  
 [address] [facility] [address] [phone]  
  
   
 8 [facility] [address] Upcoming Health Maintenance Date Due Pneumococcal 19-64 Highest Risk (2 of 3 - PCV13) 11/11/2025* FOOT EXAM Q1 6/2/2017 HEMOGLOBIN A1C Q6M 7/16/2017 MICROALBUMIN Q1 7/19/2017 INFLUENZA AGE 9 TO ADULT 8/1/2017 LIPID PANEL Q1 8/26/2017 PAP AKA CERVICAL CYTOLOGY 11/17/2017 EYE EXAM RETINAL OR DILATED Q1 1/30/2018 COLONOSCOPY 4/21/2020 DTaP/Tdap/Td series (2 - Td) 6/13/2026 *Topic was postponed. The date shown is not the original due date. Allergies as of 5/1/2017  Review Complete On: 5/1/2017 By: Adalgisa Bueno MD  
  
 Severity Noted Reaction Type Reactions Ace Inhibitors  03/14/2012    Cough Amlodipine  01/02/2017    Swelling Avapro [Irbesartan]  03/14/2012    Unable to Obtain Bactrim [Sulfamethoxazole-trimethoprim]  04/13/2010    Other (comments) Sore mouth Captopril  04/13/2010    Cough Carvedilol  07/19/2016   Side Effect Diarrhea Contrast Agent [Iodine]  04/13/2010    Itching Fish Containing Products  10/29/2013    Hives Morphine  04/13/2010    Nausea and Vomiting, Swelling Penicillins  04/13/2010    Hives Pravachol [Pravastatin]  04/13/2010    Nausea Only Seafood [Shellfish Containing Products]  03/14/2012    Hives Singulair [Montelukast]  04/13/2010    Other (comments)  
 palpitations Current Immunizations  Reviewed on 5/1/2017 Name Date Influenza Vaccine 10/1/2016, 10/1/2015, 10/5/2014 Influenza Vaccine PF 11/1/2013  8:55 AM  
 Influenza Vaccine Split 11/7/2011  1:58 PM, 11/30/2010 Pneumococcal Vaccine (Unspecified Type) 10/10/2009 Tdap 6/13/2016 Reviewed by Camron Hdez LPN on 9/4/0734 at  0:25 PM  
You Were Diagnosed With   
  
 Codes Comments Preop general physical exam    -  Primary ICD-10-CM: Q00.538 ICD-9-CM: V72.83 Age-related cataract of both eyes, unspecified age-related cataract type     ICD-10-CM: H25.9 ICD-9-CM: 366.10 Anxiety as acute reaction to gross stress     ICD-10-CM: F41.1, F43.0 ICD-9-CM: 308.0 PAF (paroxysmal atrial fibrillation) (HCC)     ICD-10-CM: I48.0 ICD-9-CM: 427.31 Vitals BP Pulse Temp Resp Height(growth percentile) Weight(growth percentile) 150/66 (BP 1 Location: Left arm, BP Patient Position: Sitting) 65 98.1 °F (36.7 °C) (Oral) 14 5' 9\" (1.753 m) 265 lb (120.2 kg) SpO2 BMI OB Status Smoking Status 95% 39.13 kg/m2 Postmenopausal Never Smoker Vitals History BMI and BSA Data Body Mass Index Body Surface Area  
 39.13 kg/m 2 2.42 m 2 Preferred Pharmacy Pharmacy Name Phone [facility] [address], [address] [phone] Your Updated Medication List  
  
   
This list is accurate as of: 5/1/17  4:09 PM.  Always use your most recent med list.  
  
  
  
  
 bumetanide 1 mg tablet Commonly known as:  Aldridge Headley TAKE ONE TABLET BY MOUTH TWICE A DAY (GENERIC FOR Aldridge Headley) busPIRone 10 mg tablet Commonly known as:  BUSPAR Take 1 Tab by mouth three (3) times daily. calcium carbonate 200 mg calcium (500 mg) Chew Commonly known as:  TUMS Take 1 Tab by mouth three (3) times daily (after meals). cholecalciferol (VITAMIN D3) 5,000 unit Tab tablet Commonly known as:  VITAMIN D3 Take 5,000 Units by mouth daily. cloNIDine HCl 0.3 mg tablet Commonly known as:  CATAPRES  
TAKE ONE TABLET BY MOUTH TWICE A DAY  
  
 cyanocobalamin 1,000 mcg sublingual tablet Commonly known as:  VITAMIN B-12 Take 1,000 mcg by mouth daily. doxazosin 2 mg tablet Commonly known as:  CARDURA TAKE ONE TABLET BY MOUTH ONCE NIGHTLY FLINTSTONES COMPLETE (IRON) chewable tablet Generic drug:  flintstones complete Take 1 Tab by mouth daily. hydrALAZINE 50 mg tablet Commonly known as:  APRESOLINE  
TAKE ONE TABLET BY MOUTH THREE TIMES A DAY  
  
 insulin glargine 100 unit/mL injection Commonly known as:  LANTUS Inject 15 units under the skin once daily  
  
 insulin lispro 100 unit/mL kwikpen Commonly known as:  HUMALOG  
2 units with dinner for sugars over 200 Insulin Needles (Disposable) 30 gauge x 1/3\" Commonly known as:  NOVOFINE 30  
Use 1 needle daily for injections. Insulin Syringe-Needle U-100 1/2 mL 30 gauge x 5/16 Syrg USE ONE SYRINGE AS NEEDED DAILY  
  
 metOLazone 5 mg tablet Commonly known as:  Markus Cardoza Take 1 Tab by mouth daily as needed (take if develop increased LE edema, weight gain > 5 pounds. ). metoprolol succinate 100 mg tablet Commonly known as:  TOPROL-XL  
TAKE TWO TABLETS BY MOUTH DAILY potassium chloride SR 10 mEq tablet Commonly known as:  KLOR-CON 10  
TAKE ONE TABLET BY MOUTH DAILY  
  
 sertraline 50 mg tablet Commonly known as:  ZOLOFT Take one and a half tablets daily TYLENOL PM PO Take 2 Tabs by mouth nightly as needed. vitamin A 8,000 unit capsule Take 8,000 Units by mouth daily. warfarin 4 mg tablet Commonly known as:  COUMADIN  
1 tablet Fridays and 0.5 tablets all other days. We Performed the Following AMB POC PT/INR [31414 CPT(R)] Follow-up Instructions Return in about 10 days (around 5/11/2017) for lab only non-fasting (Not on Friday)  [facility] 5/11. To-Do List   
 05/11/2017 Lab:  PROTHROMBIN TIME + INR Patient Instructions Warfarin 4 mg tablets: Take none today and none tomorrow, then 1 tablet Fridays and 0.5 tablets all other days. Repeat Protime/INR in about 10 days. Keep appointment on 5/22 Taking Warfarin Safely: Care Instructions Your Care Instructions Warfarin is a medicine that you take to prevent blood clots. It is often called a blood thinner. Doctors give warfarin (such as Coumadin) to reduce the risk of blood clots. You may be at risk for blood clots if you have atrial fibrillation or deep vein thrombosis. Some other health problems may also put you at risk. Warfarin slows the amount of time it takes for your blood to clot. It can cause bleeding problems. Even if you've been taking warfarin for a while, it's important to know how to take it safely. Foods and other medicines can affect the way warfarin works. Some can make warfarin work too well. This can cause bleeding problems. And some can make it work poorly, so that it does not prevent blood clots very well. You will need regular blood tests to check how long it takes for your blood to form a clot. This test is called a PT or prothrombin time test. The result of the test is called an INR level.  Depending on the test results, your doctor or anticoagulation clinic may adjust your dose of warfarin. Follow-up care is a key part of your treatment and safety. Be sure to make and go to all appointments, and call your doctor if you are having problems. It's also a good idea to know your test results and keep a list of the medicines you take. How can you care for yourself at home? Take warfarin safely · Take your warfarin at the same time each day. · If you miss a dose of warfarin, don't take an extra dose to make up for it. Your doctor can tell you exactly what to do so you don't take too much or too little. · Wear medical alert jewelry that lets others know that you take warfarin. You can buy this at most drugstores. · Don't take warfarin if you are pregnant or planning to get pregnant. Talk to your doctor about how you can prevent getting pregnant while you are taking it. · Don't change your dose or stop taking warfarin unless your doctor tells you to. Effects of medicines and food on warfarin · Don't start or stop taking any medicines, vitamins, or natural remedies unless you first talk to your doctor. Many medicines can affect how warfarin works. These include aspirin and other pain relievers, over-the-counter medicines, multivitamins, dietary supplements, and herbal products. · Tell all of your doctors and pharmacists that you take warfarin. Some prescription medicines can affect how warfarin works. · Keep the amount of vitamin K in your diet about the same from day to day. Do not suddenly eat a lot more or a lot less food that is rich in vitamin K than you usually do. Vitamin K affects how warfarin works and how your blood clots. Talk with your doctor before making big changes in your diet. Foods that have a lot of vitamin K include cooked green vegetables, such as: ¨ Kale, spinach, turnip greens, jae greens, Swiss chard, and mustard greens. ¨ Menan sprouts, broccoli, and asparagus. · Limit your use of alcohol. Avoid bleeding by preventing falls and injuries · Wear slippers or shoes with nonskid soles. · Remove throw rugs and clutter. · Rearrange furniture and electrical cords to keep them out of walking paths. · Keep stairways, porches, and outside walkways well lit. Use night-lights in hallways and bathrooms. · Be extra careful when you work with sharp tools or knives. When should you call for help? Call 911 anytime you think you may need emergency care. For example, call if: 
· You have a sudden, severe headache that is different from past headaches. Call your doctor now or seek immediate medical care if: 
· You have any abnormal bleeding, such as: 
¨ Nosebleeds. ¨ Vaginal bleeding that is different (heavier, more frequent, at a different time of the month) than what you are used to. ¨ Bloody or black stools, or rectal bleeding. ¨ Bloody or pink urine. Watch closely for changes in your health, and be sure to contact your doctor if you have any problems. Where can you learn more? Go to http://donaMiniVaxwindy.info/. Enter R903 in the search box to learn more about \"Taking Warfarin Safely: Care Instructions. \" Current as of: November 15, 2016 Content Version: 11.2 © 8604-6512 [employer]. Care instructions adapted under license by [employer] (which disclaims liability or warranty for this information). If you have questions about a medical condition or this instruction, always ask your healthcare professional. [place] [address] any warranty or liability for your use of this information. Introducing \[facility]\"" & HEALTH SERVICES! Toribio Nyhan introduces [employer] patient portal. Now you can access parts of your medical record, email your doctor's office, and request medication refills online. 1. In your internet browser, go to [email or web address] 2. Click on the First Time User? Click Here link in the Sign In box. You will see the New Member Sign Up page. 3. Enter your [employer] Access Code exactly as it appears below. You will not need to use this code after youve completed the sign-up process. If you do not sign up before the expiration date, you must request a new code. · [employer] Access Code: [email or web address] Expires: 6/28/2017  3:15 PM 
 
4. Enter the last four digits of your Social Security Number (xxxx) and Date of Birth (mm/dd/yyyy) as indicated and click Submit. You will be taken to the next sign-up page. 5. Create a [employer] ID. This will be your [employer] login ID and cannot be changed, so think of one that is secure and easy to remember. 6. Create a [employer] password. You can change your password at any time. 7. Enter your Password Reset Question and Answer. This can be used at a later time if you forget your password. 8. Enter your e-mail address. You will receive e-mail notification when new information is available in [address] 9. Click Sign Up. You can now view and download portions of your medical record. 10. Click the Download Summary menu link to download a portable copy of your medical information. If you have questions, please visit the Frequently Asked Questions section of the [employer] website. Remember, [employer] is NOT to be used for urgent needs. For medical emergencies, dial 911. Now available from your iPhone and Android! Please provide this summary of care documentation to your next provider. Your primary care clinician is listed as Alysha Marx. If you have any questions after today's visit, please call [phone].

## 2017-05-11 RX ORDER — POTASSIUM CHLORIDE 750 MG/1
TABLET, FILM COATED, EXTENDED RELEASE ORAL
Qty: 30 TAB | Refills: 0 | Status: SHIPPED | OUTPATIENT
Start: 2017-05-11 | End: 2017-06-09 | Stop reason: SDUPTHER

## 2017-05-15 ENCOUNTER — APPOINTMENT (OUTPATIENT)
Dept: INTERNAL MEDICINE CLINIC | Age: 57
End: 2017-05-15

## 2017-05-15 DIAGNOSIS — I48.0 PAF (PAROXYSMAL ATRIAL FIBRILLATION) (HCC): ICD-10-CM

## 2017-05-15 DIAGNOSIS — E78.2 MIXED HYPERLIPIDEMIA: ICD-10-CM

## 2017-05-15 DIAGNOSIS — N18.4 CKD (CHRONIC KIDNEY DISEASE), STAGE IV (HCC): ICD-10-CM

## 2017-05-16 LAB
ALBUMIN SERPL-MCNC: 4 G/DL (ref 3.5–5.5)
ALBUMIN/GLOB SERPL: 1.4 {RATIO} (ref 1.2–2.2)
ALP SERPL-CCNC: 130 IU/L (ref 39–117)
ALT SERPL-CCNC: 13 IU/L (ref 0–32)
AST SERPL-CCNC: 16 IU/L (ref 0–40)
BASOPHILS # BLD AUTO: 0.1 X10E3/UL (ref 0–0.2)
BASOPHILS NFR BLD AUTO: 1 %
BILIRUB SERPL-MCNC: <0.2 MG/DL (ref 0–1.2)
BUN SERPL-MCNC: 31 MG/DL (ref 6–24)
BUN/CREAT SERPL: 17 (ref 9–23)
CALCIUM SERPL-MCNC: 8.6 MG/DL (ref 8.7–10.2)
CHLORIDE SERPL-SCNC: 105 MMOL/L (ref 96–106)
CO2 SERPL-SCNC: 19 MMOL/L (ref 18–29)
CREAT SERPL-MCNC: 1.78 MG/DL (ref 0.57–1)
EOSINOPHIL # BLD AUTO: 0.2 X10E3/UL (ref 0–0.4)
EOSINOPHIL NFR BLD AUTO: 3 %
ERYTHROCYTE [DISTWIDTH] IN BLOOD BY AUTOMATED COUNT: 14 % (ref 12.3–15.4)
GLOBULIN SER CALC-MCNC: 2.9 G/DL (ref 1.5–4.5)
GLUCOSE SERPL-MCNC: 192 MG/DL (ref 65–99)
HCT VFR BLD AUTO: 35.8 % (ref 34–46.6)
HGB BLD-MCNC: 11.2 G/DL (ref 11.1–15.9)
IMM GRANULOCYTES # BLD: 0 X10E3/UL (ref 0–0.1)
IMM GRANULOCYTES NFR BLD: 0 %
INR PPP: 2 (ref 0.8–1.2)
INTERPRETATION: NORMAL
LYMPHOCYTES # BLD AUTO: 1.2 X10E3/UL (ref 0.7–3.1)
LYMPHOCYTES NFR BLD AUTO: 17 %
MCH RBC QN AUTO: 29.2 PG (ref 26.6–33)
MCHC RBC AUTO-ENTMCNC: 31.3 G/DL (ref 31.5–35.7)
MCV RBC AUTO: 94 FL (ref 79–97)
MONOCYTES # BLD AUTO: 0.6 X10E3/UL (ref 0.1–0.9)
MONOCYTES NFR BLD AUTO: 9 %
NEUTROPHILS # BLD AUTO: 5 X10E3/UL (ref 1.4–7)
NEUTROPHILS NFR BLD AUTO: 70 %
PLATELET # BLD AUTO: 324 X10E3/UL (ref 150–379)
POTASSIUM SERPL-SCNC: 4.5 MMOL/L (ref 3.5–5.2)
PROT SERPL-MCNC: 6.9 G/DL (ref 6–8.5)
PROTHROMBIN TIME: 20.6 SEC (ref 9.1–12)
RBC # BLD AUTO: 3.83 X10E6/UL (ref 3.77–5.28)
SODIUM SERPL-SCNC: 143 MMOL/L (ref 134–144)
WBC # BLD AUTO: 7.1 X10E3/UL (ref 3.4–10.8)

## 2017-05-16 NOTE — PROGRESS NOTES
Inform patient INR is therapeutic. No change in warfarin dose. Repeat in one month. Creatinine (kidney test) is somewhat better. Blood counts are normal. Will review in detail at [facility] 5/22.

## 2017-05-16 NOTE — PROGRESS NOTES
Left detailed message for patient r/t INR/appt and labs.  Informed to return call to the office with any questions

## 2017-05-19 ENCOUNTER — DOCUMENTATION ONLY (OUTPATIENT)
Dept: INTERNAL MEDICINE CLINIC | Age: 57
End: 2017-05-19

## 2017-05-22 ENCOUNTER — OFFICE VISIT (OUTPATIENT)
Dept: INTERNAL MEDICINE CLINIC | Age: 57
End: 2017-05-22

## 2017-05-22 VITALS
RESPIRATION RATE: 14 BRPM | HEART RATE: 68 BPM | DIASTOLIC BLOOD PRESSURE: 72 MMHG | HEIGHT: 69 IN | SYSTOLIC BLOOD PRESSURE: 162 MMHG | WEIGHT: 264.5 LBS | BODY MASS INDEX: 39.18 KG/M2 | TEMPERATURE: 96.5 F | OXYGEN SATURATION: 96 %

## 2017-05-22 DIAGNOSIS — I49.5 SICK SINUS SYNDROME (HCC): ICD-10-CM

## 2017-05-22 DIAGNOSIS — Z79.01 LONG TERM (CURRENT) USE OF ANTICOAGULANTS: ICD-10-CM

## 2017-05-22 DIAGNOSIS — I48.0 PAF (PAROXYSMAL ATRIAL FIBRILLATION) (HCC): ICD-10-CM

## 2017-05-22 DIAGNOSIS — Z13.31 SCREENING FOR DEPRESSION: ICD-10-CM

## 2017-05-22 DIAGNOSIS — E78.2 MIXED HYPERLIPIDEMIA: ICD-10-CM

## 2017-05-22 DIAGNOSIS — I10 ESSENTIAL HYPERTENSION WITH GOAL BLOOD PRESSURE LESS THAN 140/90: ICD-10-CM

## 2017-05-22 LAB — HBA1C MFR BLD HPLC: 7.4 % (ref 4.8–5.6)

## 2017-05-22 RX ORDER — HYDRALAZINE HYDROCHLORIDE 50 MG/1
100 TABLET, FILM COATED ORAL 3 TIMES DAILY
Qty: 90 TAB | Refills: 3 | Status: SHIPPED | OUTPATIENT
Start: 2017-05-22 | End: 2017-05-23 | Stop reason: SDUPTHER

## 2017-05-22 NOTE — PATIENT INSTRUCTIONS
Increase hydralazine to 100 mg three times a day   Office visit in 3 months with fasting lab work a week before visit. Start warfarin the day after eye surgery, unless eye doctor directs otherwise. Take one and a half tablets for the first 2 days, then 1 tablet Fridays and 0.5 tablets all other days. Then INR in 10 days. Learning About Diabetes Food Guidelines  Your Care Instructions  Meal planning is important to manage diabetes. It helps keep your blood sugar at a target level (which you set with your doctor). You don't have to eat special foods. You can eat what your family eats, including sweets once in a while. But you do have to pay attention to how often you eat and how much you eat of certain foods. You may want to work with a dietitian or a certified diabetes educator (CDE) to help you plan meals and snacks. A dietitian or CDE can also help you lose weight if that is one of your goals. What should you know about eating carbs? Managing the amount of carbohydrate (carbs) you eat is an important part of healthy meals when you have diabetes. Carbohydrate is found in many foods. · Learn which foods have carbs. And learn the amounts of carbs in different foods. ¨ Bread, cereal, pasta, and rice have about 15 grams of carbs in a serving. A serving is 1 slice of bread (1 ounce), ½ cup of cooked cereal, or 1/3 cup of cooked pasta or rice. ¨ Fruits have 15 grams of carbs in a serving. A serving is 1 small fresh fruit, such as an apple or orange; ½ of a banana; ½ cup of cooked or canned fruit; ½ cup of fruit juice; 1 cup of melon or raspberries; or 2 tablespoons of dried fruit. ¨ Milk and no-sugar-added yogurt have 15 grams of carbs in a serving. A serving is 1 cup of milk or 2/3 cup of no-sugar-added yogurt. ¨ Starchy vegetables have 15 grams of carbs in a serving.  A serving is ½ cup of mashed potatoes or sweet potato; 1 cup winter squash; ½ of a small baked potato; ½ cup of cooked beans; or ½ cup cooked corn or green peas. · Learn how much carbs to eat each day and at each meal. A dietitian or CDE can teach you how to keep track of the amount of carbs you eat. This is called carbohydrate counting. · If you are not sure how to count carbohydrate grams, use the Plate Method to plan meals. It is a good, quick way to make sure that you have a balanced meal. It also helps you spread carbs throughout the day. ¨ Divide your plate by types of foods. Put non-starchy vegetables on half the plate, meat or other protein food on one-quarter of the plate, and a grain or starchy vegetable in the final quarter of the plate. To this you can add a small piece of fruit and 1 cup of milk or yogurt, depending on how many carbs you are supposed to eat at a meal.  · Try to eat about the same amount of carbs at each meal. Do not \"save up\" your daily allowance of carbs to eat at one meal.  · Proteins have very little or no carbs per serving. Examples of proteins are beef, chicken, turkey, fish, eggs, tofu, cheese, cottage cheese, and peanut butter. A serving size of meat is 3 ounces, which is about the size of a deck of cards. Examples of meat substitute serving sizes (equal to 1 ounce of meat) are 1/4 cup of cottage cheese, 1 egg, 1 tablespoon of peanut butter, and ½ cup of tofu. How can you eat out and still eat healthy? · Learn to estimate the serving sizes of foods that have carbohydrate. If you measure food at home, it will be easier to estimate the amount in a serving of restaurant food. · If the meal you order has too much carbohydrate (such as potatoes, corn, or baked beans), ask to have a low-carbohydrate food instead. Ask for a salad or green vegetables. · If you use insulin, check your blood sugar before and after eating out to help you plan how much to eat in the future. · If you eat more carbohydrate at a meal than you had planned, take a walk or do other exercise.  This will help lower your blood sugar.  What else should you know? · Limit saturated fat, such as the fat from meat and dairy products. This is a healthy choice because people who have diabetes are at higher risk of heart disease. So choose lean cuts of meat and nonfat or low-fat dairy products. Use olive or canola oil instead of butter or shortening when cooking. · Don't skip meals. Your blood sugar may drop too low if you skip meals and take insulin or certain medicines for diabetes. · Check with your doctor before you drink alcohol. Alcohol can cause your blood sugar to drop too low. Alcohol can also cause a bad reaction if you take certain diabetes medicines. Follow-up care is a key part of your treatment and safety. Be sure to make and go to all appointments, and call your doctor if you are having problems. It's also a good idea to know your test results and keep a list of the medicines you take. Where can you learn more? Go to http://dona-windy.info/. Enter W714 in the search box to learn more about \"Learning About Diabetes Food Guidelines. \"  Current as of: May 23, 2016  Content Version: 11.2  © 5278-6887 [employer]wise, [employer]. Care instructions adapted under license by [employer] (which disclaims liability or warranty for this information). If you have questions about a medical condition or this instruction, always ask your healthcare professional. [address] any warranty or liability for your use of this information.

## 2017-05-22 NOTE — PROGRESS NOTES
,Reviewed record In preparation for visit and have obtained necessary documentation. Has info on advanced directive but has not filled them out. 1. Have you been to the ER, urgent care clinic or hospitalized since your last visit? No     2. Have you seen or consulted any other health care providers outside of the [facility] since your last visit? Include any pap smears or colon screening.  No    Health Maintenance reviewed:

## 2017-05-22 NOTE — MR AVS SNAPSHOT
Visit Information Date & Time Provider Department Dept. Phone Encounter #  
 5/22/2017  3:30 PM Dino Gottlieb MD [facility] [phone] [id number] Follow-up Instructions Return in about 3 months (around 8/22/2017) for DM, HTN, NON-fasting lab one week prior  . Your Appointments 6/15/2017  3:00 PM  
LAB with LAB [facility] [address]) Appt Note: INR  
 [address] [address] [phone]  
  
   
 [address] Upcoming Health Maintenance Date Due  
 FOOT EXAM Q1 6/2/2017 Pneumococcal 19-64 Highest Risk (2 of 3 - PCV13) 11/11/2025* MICROALBUMIN Q1 7/19/2017 INFLUENZA AGE 9 TO ADULT 8/1/2017 LIPID PANEL Q1 8/26/2017 PAP AKA CERVICAL CYTOLOGY 11/17/2017 HEMOGLOBIN A1C Q6M 11/22/2017 EYE EXAM RETINAL OR DILATED Q1 1/30/2018 COLONOSCOPY 4/21/2020 DTaP/Tdap/Td series (2 - Td) 6/13/2026 *Topic was postponed. The date shown is not the original due date. Allergies as of 5/22/2017  Review Complete On: 5/22/2017 By: Dino Gottlieb MD  
  
 Severity Noted Reaction Type Reactions Ace Inhibitors  03/14/2012    Cough Amlodipine  01/02/2017    Swelling Avapro [Irbesartan]  03/14/2012    Unable to Obtain Bactrim [Sulfamethoxazole-trimethoprim]  04/13/2010    Other (comments) Sore mouth Captopril  04/13/2010    Cough Carvedilol  07/19/2016   Side Effect Diarrhea Contrast Agent [Iodine]  04/13/2010    Itching Fish Containing Products  10/29/2013    Hives Morphine  04/13/2010    Nausea and Vomiting, Swelling Penicillins  04/13/2010    Hives Pravachol [Pravastatin]  04/13/2010    Nausea Only Seafood [Shellfish Containing Products]  03/14/2012    Hives Singulair [Montelukast]  04/13/2010    Other (comments)  
 palpitations Current Immunizations  Reviewed on 5/22/2017 Name Date Influenza Vaccine 10/1/2016, 10/1/2015, 10/5/2014 Influenza Vaccine PF 11/1/2013  8:55 AM  
 Influenza Vaccine Split 11/7/2011  1:58 PM, 11/30/2010 Pneumococcal Vaccine (Unspecified Type) 10/10/2009 Tdap 6/13/2016 Reviewed by Renetta Moss LPN on 9/10/9208 at  3:36 PM  
You Were Diagnosed With   
  
 Codes Comments Uncontrolled type 2 diabetes mellitus with stage 4 chronic kidney disease, with long-term current use of insulin (HCC)    -  Primary ICD-10-CM: E11.22, E11.65, N18.4, Z79.4 ICD-9-CM: 250.52, 585.4, V58.67 Essential hypertension with goal blood pressure less than 140/90     ICD-10-CM: I10 
ICD-9-CM: 401.9 Mixed hyperlipidemia     ICD-10-CM: E78.2 ICD-9-CM: 272.2 PAF (paroxysmal atrial fibrillation) (HCC)     ICD-10-CM: I48.0 ICD-9-CM: 427.31 Sick sinus syndrome (HCC)     ICD-10-CM: I49.5 ICD-9-CM: 427.81 Long term (current) use of anticoagulants     ICD-10-CM: Z79.01 
ICD-9-CM: V58.61 Vitals BP Pulse Temp Resp Height(growth percentile) Weight(growth percentile) 162/72 (BP 1 Location: Left arm, BP Patient Position: Sitting) 68 96.5 °F (35.8 °C) (Oral) 14 5' 9\" (1.753 m) 264 lb 8 oz (120 kg) SpO2 BMI OB Status Smoking Status 96% 39.06 kg/m2 Postmenopausal Never Smoker Vitals History BMI and BSA Data Body Mass Index Body Surface Area 39.06 kg/m 2 2.42 m 2 Preferred Pharmacy Pharmacy Name Phone [facility] [address], [address]  [phone] Your Updated Medication List  
  
   
This list is accurate as of: 5/22/17  4:47 PM.  Always use your most recent med list.  
  
  
  
  
 bumetanide 1 mg tablet Commonly known as:  Asha Abler TAKE ONE TABLET BY MOUTH TWICE A DAY (GENERIC FOR Asha Abler) busPIRone 10 mg tablet Commonly known as:  BUSPAR Take 1 Tab by mouth three (3) times daily. calcium carbonate 200 mg calcium (500 mg) Chew Commonly known as:  TUMS Take 1 Tab by mouth three (3) times daily (after meals). cholecalciferol (VITAMIN D3) 5,000 unit Tab tablet Commonly known as:  VITAMIN D3 Take 5,000 Units by mouth daily. cloNIDine HCl 0.3 mg tablet Commonly known as:  CATAPRES  
TAKE ONE TABLET BY MOUTH TWICE A DAY  
  
 cyanocobalamin 1,000 mcg sublingual tablet Commonly known as:  VITAMIN B-12 Take 1,000 mcg by mouth daily. doxazosin 2 mg tablet Commonly known as:  CARDURA TAKE ONE TABLET BY MOUTH ONCE NIGHTLY FLINTSTONES COMPLETE (IRON) chewable tablet Generic drug:  flintstones complete Take 1 Tab by mouth daily. hydrALAZINE 50 mg tablet Commonly known as:  APRESOLINE Take 2 Tabs by mouth three (3) times daily. insulin glargine 100 unit/mL injection Commonly known as:  LANTUS Inject 15 units under the skin once daily  
  
 insulin lispro 100 unit/mL kwikpen Commonly known as:  HUMALOG  
2 units with dinner for sugars over 200 Insulin Needles (Disposable) 30 gauge x 1/3\" Commonly known as:  NOVOFINE 30  
Use 1 needle daily for injections. Insulin Syringe-Needle U-100 1/2 mL 30 gauge x 5/16 Syrg USE ONE SYRINGE AS NEEDED DAILY  
  
 metOLazone 5 mg tablet Commonly known as:  Hodan Racer Take 1 Tab by mouth daily as needed (take if develop increased LE edema, weight gain > 5 pounds. ). metoprolol succinate 100 mg tablet Commonly known as:  TOPROL-XL  
TAKE TWO TABLETS BY MOUTH DAILY potassium chloride SR 10 mEq tablet Commonly known as:  KLOR-CON 10  
TAKE ONE TABLET BY MOUTH DAILY  
  
 sertraline 50 mg tablet Commonly known as:  ZOLOFT Take one and a half tablets daily TYLENOL PM PO Take 2 Tabs by mouth nightly as needed. vitamin A 8,000 unit capsule Take 8,000 Units by mouth daily. warfarin 4 mg tablet Commonly known as:  COUMADIN  
1 tablet Fridays and 0.5 tablets all other days. Prescriptions Sent to Pharmacy Refills  
 hydrALAZINE (APRESOLINE) 50 mg tablet 3 Sig: Take 2 Tabs by mouth three (3) times daily. Class: Normal  
 Pharmacy: [facility] [address], [address] Ph #: [phone] Route: Oral  
  
We Performed the Following AMB POC HEMOGLOBIN A1C [27954 CPT(R)]  DIABETES FOOT EXAM [7 Custom] Follow-up Instructions Return in about 3 months (around 8/22/2017) for DM, HTN, NON-fasting lab one week prior  . To-Do List   
 06/01/2017 Lab:  PROTHROMBIN TIME + INR   
  
 08/22/2017 Lab:  HEMOGLOBIN A1C WITH EAG   
  
 08/22/2017 Lab:  LIPID PANEL   
  
 08/22/2017 Lab:  METABOLIC PANEL, BASIC   
  
 08/22/2017 Lab:  MICROALBUMIN, UR, RAND W/ MICROALBUMIN/CREA RATIO Patient Instructions Increase hydralazine to 100 mg three times a day Office visit in 3 months with fasting lab work a week before visit. Start warfarin the day after eye surgery, unless eye doctor directs otherwise. Take one and a half tablets for the first 2 days, then 1 tablet Fridays and 0.5 tablets all other days. Then INR in 10 days. Learning About Diabetes Food Guidelines Your Care Instructions Meal planning is important to manage diabetes. It helps keep your blood sugar at a target level (which you set with your doctor). You don't have to eat special foods. You can eat what your family eats, including sweets once in a while. But you do have to pay attention to how often you eat and how much you eat of certain foods. You may want to work with a dietitian or a certified diabetes educator (CDE) to help you plan meals and snacks. A dietitian or CDE can also help you lose weight if that is one of your goals. What should you know about eating carbs? Managing the amount of carbohydrate (carbs) you eat is an important part of healthy meals when you have diabetes. Carbohydrate is found in many foods. · Learn which foods have carbs. And learn the amounts of carbs in different foods. ¨ Bread, cereal, pasta, and rice have about 15 grams of carbs in a serving. A serving is 1 slice of bread (1 ounce), ½ cup of cooked cereal, or 1/3 cup of cooked pasta or rice. ¨ Fruits have 15 grams of carbs in a serving. A serving is 1 small fresh fruit, such as an apple or orange; ½ of a banana; ½ cup of cooked or canned fruit; ½ cup of fruit juice; 1 cup of melon or raspberries; or 2 tablespoons of dried fruit. ¨ Milk and no-sugar-added yogurt have 15 grams of carbs in a serving. A serving is 1 cup of milk or 2/3 cup of no-sugar-added yogurt. ¨ Starchy vegetables have 15 grams of carbs in a serving. A serving is ½ cup of mashed potatoes or sweet potato; 1 cup winter squash; ½ of a small baked potato; ½ cup of cooked beans; or ½ cup cooked corn or green peas. · Learn how much carbs to eat each day and at each meal. A dietitian or CDE can teach you how to keep track of the amount of carbs you eat. This is called carbohydrate counting. · If you are not sure how to count carbohydrate grams, use the Plate Method to plan meals. It is a good, quick way to make sure that you have a balanced meal. It also helps you spread carbs throughout the day. ¨ Divide your plate by types of foods. Put non-starchy vegetables on half the plate, meat or other protein food on one-quarter of the plate, and a grain or starchy vegetable in the final quarter of the plate. To this you can add a small piece of fruit and 1 cup of milk or yogurt, depending on how many carbs you are supposed to eat at a meal. 
· Try to eat about the same amount of carbs at each meal. Do not \"save up\" your daily allowance of carbs to eat at one meal. 
· Proteins have very little or no carbs per serving. Examples of proteins are beef, chicken, turkey, fish, eggs, tofu, cheese, cottage cheese, and peanut butter.  A serving size of meat is 3 ounces, which is about the size of a deck of cards. Examples of meat substitute serving sizes (equal to 1 ounce of meat) are 1/4 cup of cottage cheese, 1 egg, 1 tablespoon of peanut butter, and ½ cup of tofu. How can you eat out and still eat healthy? · Learn to estimate the serving sizes of foods that have carbohydrate. If you measure food at home, it will be easier to estimate the amount in a serving of restaurant food. · If the meal you order has too much carbohydrate (such as potatoes, corn, or baked beans), ask to have a low-carbohydrate food instead. Ask for a salad or green vegetables. · If you use insulin, check your blood sugar before and after eating out to help you plan how much to eat in the future. · If you eat more carbohydrate at a meal than you had planned, take a walk or do other exercise. This will help lower your blood sugar. What else should you know? · Limit saturated fat, such as the fat from meat and dairy products. This is a healthy choice because people who have diabetes are at higher risk of heart disease. So choose lean cuts of meat and nonfat or low-fat dairy products. Use olive or canola oil instead of butter or shortening when cooking. · Don't skip meals. Your blood sugar may drop too low if you skip meals and take insulin or certain medicines for diabetes. · Check with your doctor before you drink alcohol. Alcohol can cause your blood sugar to drop too low. Alcohol can also cause a bad reaction if you take certain diabetes medicines. Follow-up care is a key part of your treatment and safety. Be sure to make and go to all appointments, and call your doctor if you are having problems. It's also a good idea to know your test results and keep a list of the medicines you take. Where can you learn more? Go to http://dona-windy.info/. Enter J650 in the search box to learn more about \"Learning About Diabetes Food Guidelines. \" Current as of: May 23, 2016 Content Version: 11.2 © 0807-6699 [employer]. Care instructions adapted under license by [employer] (which disclaims liability or warranty for this information). If you have questions about a medical condition or this instruction, always ask your healthcare professional. [address] any warranty or liability for your use of this information. Introducing [facility]! [facility] introduces GiftLauncher patient portal. Now you can access parts of your medical record, email your doctor's office, and request medication refills online. 1. In your internet browser, go to [email or web address] 2. Click on the First Time User? Click Here link in the Sign In box. You will see the New Member Sign Up page. 3. Enter your GiftLauncher Access Code exactly as it appears below. You will not need to use this code after youve completed the sign-up process. If you do not sign up before the expiration date, you must request a new code. · GiftLauncher Access Code: [email or web address] Expires: 6/28/2017  3:15 PM 
 
4. Enter the last four digits of your Social Security Number (xxxx) and Date of Birth (mm/dd/yyyy) as indicated and click Submit. You will be taken to the next sign-up page. 5. Create a GiftLauncher ID. This will be your GiftLauncher login ID and cannot be changed, so think of one that is secure and easy to remember. 6. Create a GiftLauncher password. You can change your password at any time. 7. Enter your Password Reset Question and Answer. This can be used at a later time if you forget your password. 8. Enter your e-mail address. You will receive e-mail notification when new information is available in [address] 9. Click Sign Up. You can now view and download portions of your medical record. 10. Click the Download Summary menu link to download a portable copy of your medical information.  
 
If you have questions, please visit the Frequently Asked Questions section of the [employer]. Remember, [place] Everywherehart is NOT to be used for urgent needs. For medical emergencies, dial 911. Now available from your iPhone and Android! Please provide this summary of care documentation to your next provider. Your primary care clinician is listed as [facility] Tr[facility]. If you have any questions after today's visit, please call [phone].

## 2017-05-22 NOTE — PROGRESS NOTES
HISTORY OF PRESENT ILLNESS  Raymundo Mccain is a 61 y.o. female. HPI  She presents for follow up of diabetes mellitus, hypertension, hyperlipidemia, Atrial Fibrillation, SSS with pacemaker, obesity and chronic kidney disease. Diabetic ROS - medication compliance: compliant most of the time,      home glucose monitoring: fasting values range 110,      home BP Monitoring: is not measured at home. further diabetic ROS: no polyuria or polydipsia, no unusual visual symptoms, no hypoglycemia, no medication side effects noted, has dysesthesias in the feet. Diet and Lifestyle: generally follows a low fat low cholesterol diet, generally follows a low sodium diet, follows a diabetic diet regularly, exercises sporadically, nonsmoker  Cardiovascular ROS:  She complains of lower extremity edema.    She denies palpitations, orthopnea, exertional chest pressure/discomfort, claudication, dyspnea on exertion, dizziness    PHQ over the last two weeks 5/22/2017   Little interest or pleasure in doing things Not at all   Feeling down, depressed or hopeless Not at all   Total Score PHQ 2 0         Patient Active Problem List   Diagnosis Code    Breast ca: f/b Dr Era Johnson C50.919    Asthma J45.909    Fe deficiency anemia D50.9    Status post ablation of atrial fibrillation Z98.890, Z86.79    Morbid obesity ([address] E66.01    Sick sinus syndrome ([address] I49.5    S/P cardiac pacemaker procedure Z95.0    Long term (current) use of anticoagulants Z79.01    Mixed hyperlipidemia E78.2    Proliferative diabetic retinopathy ([address] E11.3599    CKD (chronic kidney disease), stage IV ([facility]) N18.4    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 ([facility]) O77.46    Systolic murmur V88.5    Essential hypertension with goal blood pressure less than 140/90 I10    PAF (paroxysmal atrial fibrillation) ([facility]) I48.0    Anemia D64.9    Anemia in chronic kidney disease N18.9, D63.1    Uncontrolled type 2 diabetes mellitus with stage 4 chronic kidney disease, with long-term current use of insulin (HCC) E11.22, E11.65, N18.4, Z79.4    Obesity, Class II, BMI 35-39.9, with comorbidity ([facility] E66.9     Past Medical History:   Diagnosis Date    Acquired lymphedema     Right arm    Arrhythmia     Asthma     Atrial fibrillation (HCC)     Dr. Joselito Mendoza and Dr. Damari Malave Atrial flutter ([facility]     Cancer [facility])     bilateral breast    Chronic kidney disease     Diabetes mellitus type II     Dizziness     Essential hypertension     Hyperlipidemia     Hypertension     Long term (current) use of anticoagulants     Morbid obesity ([facility] 3/14/2012    Osteoarthritis     Pacemaker     Sick sinus syndrome ([facility]      Past Surgical History:   Procedure Laterality Date    ABDOMEN SURGERY PROC UNLISTED      gastric bypass    GASTRIC BYPASS,OBESE<150CM SAW-EN-Y  7/08    [email or web address]    HX AFIB ABLATION      HX CARPAL TUNNEL RELEASE      HX CERVICAL FUSION  1994    HX DILATION AND CURETTAGE  1992    HX MASTECTOMY  1998    RIGHT MODIFIED RADICAL     HX MOHS PROCEDURES  1995    right    HX ORTHOPAEDIC      ight knee surgery    HX PACEMAKER      IR INSERT TUNL CVC W PORT OVER 5 YEARS      [address] OF EYE      NEEDLE BIOPSY LIVER,W OTHR [address]  8.21.07    AL Shan 9462 AND 1994    US GUIDE ASP OVARIAN CYST ABD APPROACH  8.21.07    RIGHT      Social History     Social History    Marital status:      Spouse name: N/A    Number of children: N/A    Years of education: N/A     Social History Main Topics    Smoking status: Never Smoker    Smokeless tobacco: Never Used    Alcohol use Yes      Comment: rare    Drug use: No    Sexual activity: Not Asked     Other Topics Concern    None     Social History Narrative     Family History   Problem Relation Age of Onset    Cancer Mother     Heart Disease Mother     Alcohol abuse Father     Diabetes Brother     Hypertension Brother Allergies   Allergen Reactions    Ace Inhibitors Cough    Amlodipine Swelling    Avapro [Irbesartan] Unable to Obtain    Bactrim [Sulfamethoxazole-Trimethoprim] Other (comments)     Sore mouth    Captopril Cough    Carvedilol Diarrhea    Contrast Agent [Iodine] Itching    Fish Containing Products Hives    Morphine Nausea and Vomiting and Swelling    Penicillins Hives    Pravachol [Pravastatin] Nausea Only    Seafood [Shellfish Containing Products] Hives    Singulair [Montelukast] Other (comments)     palpitations     Current Outpatient Prescriptions   Medication Sig Dispense Refill    potassium chloride SR (KLOR-CON 10) 10 mEq tablet TAKE ONE TABLET BY MOUTH DAILY 30 Tab 0    warfarin (COUMADIN) 4 mg tablet 1 tablet Fridays and 0.5 tablets all other days. 30 Tab 5    sertraline (ZOLOFT) 50 mg tablet Take one and a half tablets daily 45 Tab 5    busPIRone (BUSPAR) 10 mg tablet Take 1 Tab by mouth three (3) times daily. 60 Tab 3    hydrALAZINE (APRESOLINE) 50 mg tablet TAKE ONE TABLET BY MOUTH THREE TIMES A DAY 90 Tab 3    metoprolol succinate (TOPROL-XL) 100 mg tablet TAKE TWO TABLETS BY MOUTH DAILY 60 Tab 5    doxazosin (CARDURA) 2 mg tablet TAKE ONE TABLET BY MOUTH ONCE NIGHTLY 30 Tab 9    bumetanide (BUMEX) 1 mg tablet TAKE ONE TABLET BY MOUTH TWICE A DAY (GENERIC FOR BUMEX) 60 Tab 6    cloNIDine HCl (CATAPRES) 0.3 mg tablet TAKE ONE TABLET BY MOUTH TWICE A DAY 60 Tab 10    Insulin Syringe-Needle U-100 1/2 mL 30 gauge x 5/16 syrg USE ONE SYRINGE AS NEEDED DAILY 100 Syringe 2    insulin glargine (LANTUS) 100 unit/mL injection Inject 15 units under the skin once daily 10 mL 11    metolazone (ZAROXOLYN) 5 mg tablet Take 1 Tab by mouth daily as needed (take if develop increased LE edema, weight gain > 5 pounds. ). 30 Tab 1    cholecalciferol, VITAMIN D3, (VITAMIN D3) 5,000 unit tab tablet Take 5,000 Units by mouth daily.  vitamin A 8,000 unit capsule Take 8,000 Units by mouth daily.  ACETAMINOPHEN/DIPHENHYDRAMINE (TYLENOL PM PO) Take 2 Tabs by mouth nightly as needed.  insulin lispro (HUMALOG) 100 unit/mL kwikpen 2 units with dinner for sugars over 200 1 Package 0    Insulin Needles, Disposable, (NOVOFINE 30) 30 x 1/3 \" Use 1 needle daily for injections. 1 Package 11    cyanocobalamin (VITAMIN B-12) 1,000 mcg sublingual tablet Take 1,000 mcg by mouth daily.  calcium carbonate (TUMS) 200 mg calcium (500 mg) Chew Take 1 Tab by mouth three (3) times daily (after meals).  flintstones complete (FLINTSTONES COMPLETE) chewable tablet Take 1 Tab by mouth daily. Review of Systems   Constitutional: Negative for malaise/fatigue and weight loss. Gastrointestinal: Negative for constipation and diarrhea. Musculoskeletal: Positive for joint pain (knees). Negative for back pain. Neurological: Negative for dizziness, tingling and focal weakness. Visit Vitals    /72 (BP 1 Location: Left arm, BP Patient Position: Sitting)    Pulse 68    Temp 96.5 °F (35.8 °C) (Oral)    Resp 14    Ht 5' 9\" (1.753 m)    Wt 264 lb 8 oz (120 kg)    SpO2 96%    BMI 39.06 kg/m2     Physical Exam   Constitutional: She is oriented to person, place, and time. She appears well-developed and well-nourished. HENT:   Head: Normocephalic and atraumatic. Eyes: Conjunctivae are normal. Pupils are equal, round, and reactive to light. Neck: Neck supple. Carotid bruit is not present. No thyromegaly present. Cardiovascular: Normal rate, regular rhythm and normal heart sounds. PMI is not displaced. Exam reveals no gallop. No murmur heard. Pulses:       Dorsalis pedis pulses are 1+ on the right side, and 1+ on the left side. Posterior tibial pulses are 1+ on the right side, and 1+ on the left side. Pulmonary/Chest: Effort normal. She has no wheezes. She has no rhonchi. She has no rales. Abdominal: Soft. Normal appearance. She exhibits no abdominal bruit and no mass. There is no hepatosplenomegaly. There is no tenderness. Musculoskeletal: She exhibits edema (1+). Lymphadenopathy:     She has no cervical adenopathy. Right: No supraclavicular adenopathy present. Left: No supraclavicular adenopathy present. Neurological: She is alert and oriented to person, place, and time. No sensory deficit. Skin: Skin is warm, dry and intact. No rash noted. Psychiatric: She has a normal mood and affect. Her behavior is normal.   Nursing note and vitals reviewed. Diabetic foot exam:     Left: Reflexes absent     Vibratory sensation absent    Proprioception normal   Sharp/dull discrimination absent    Filament test 0/6   Pulse DP: 1+ (weak)   Pulse PT: 1+ (weak)   Deformities: Yes - 2 nd toe shorter, small skin nick laterally  Right: Reflexes absent   Vibratory sensation absent   Proprioception normal   Sharp/dull discrimination absent   Filament test 0/6   Pulse DP: 1+ (weak)   Pulse PT: 1+ (weak)   Deformities: None    Results for orders placed or performed in visit on 73/33/64   METABOLIC PANEL, COMPREHENSIVE   Result Value Ref Range    Glucose 192 (H) 65 - 99 mg/dL    BUN 31 (H) 6 - 24 mg/dL    Creatinine 1.78 (H) 0.57 - 1.00 mg/dL    GFR est non-AA 31 (L) >59 mL/min/1.73    GFR est AA 36 (L) >59 mL/min/1.73    BUN/Creatinine ratio 17 9 - 23    Sodium 143 134 - 144 mmol/L    Potassium 4.5 3.5 - 5.2 mmol/L    Chloride 105 96 - 106 mmol/L    CO2 19 18 - 29 mmol/L    Calcium 8.6 (L) 8.7 - 10.2 mg/dL    Protein, total 6.9 6.0 - 8.5 g/dL    Albumin 4.0 3.5 - 5.5 g/dL    GLOBULIN, TOTAL 2.9 1.5 - 4.5 g/dL    A-G Ratio 1.4 1.2 - 2.2    Bilirubin, total <0.2 0.0 - 1.2 mg/dL    Alk.  phosphatase 130 (H) 39 - 117 IU/L    AST (SGOT) 16 0 - 40 IU/L    ALT (SGPT) 13 0 - 32 IU/L   CBC WITH AUTOMATED DIFF   Result Value Ref Range    WBC 7.1 3.4 - 10.8 x10E3/uL    RBC 3.83 3.77 - 5.28 x10E6/uL    HGB 11.2 11.1 - 15.9 g/dL    HCT 35.8 34.0 - 46.6 %    MCV 94 79 - 97 fL    MCH 29.2 26.6 - 33.0 pg    MCHC 31.3 (L) 31.5 - 35.7 g/dL    RDW 14.0 12.3 - 15.4 %    PLATELET 737 187 - 787 x10E3/uL    NEUTROPHILS 70 %    Lymphocytes 17 %    MONOCYTES 9 %    EOSINOPHILS 3 %    BASOPHILS 1 %    ABS. NEUTROPHILS 5.0 1.4 - 7.0 x10E3/uL    Abs Lymphocytes 1.2 0.7 - 3.1 x10E3/uL    ABS. MONOCYTES 0.6 0.1 - 0.9 x10E3/uL    ABS. EOSINOPHILS 0.2 0.0 - 0.4 x10E3/uL    ABS. BASOPHILS 0.1 0.0 - 0.2 x10E3/uL    IMMATURE GRANULOCYTES 0 %    ABS. IMM. GRANS. 0.0 0.0 - 0.1 x10E3/uL   PROTHROMBIN TIME + INR   Result Value Ref Range    INR 2.0 (H) 0.8 - 1.2    Prothrombin time 20.6 (H) 9.1 - 12.0 sec   CKD REPORT   Result Value Ref Range    Interpretation Note      Results for orders placed or performed in visit on 05/22/17   [facility] POC HEMOGLOBIN A1C   Result Value Ref Range    Hemoglobin A1c (POC) 7.4 (A) 4.8 - 5.6 %         ASSESSMENT and PLAN    ICD-10-CM ICD-9-CM    1. Uncontrolled type 2 diabetes mellitus with stage 4 chronic kidney disease, with long-term current use of insulin ([facility]) E11.22 250.52  DIABETES FOOT EXAM    E11.65 585.4 AMB POC HEMOGLOBIN A1C    N18.4 V58.67 MICROALBUMIN, UR, RAND W/ MICROALBUMIN/CREA RATIO    Z79.4  HEMOGLOBIN A1C WITH EAG   2. Essential hypertension with goal blood pressure less than 140/90 X59 444.0 METABOLIC PANEL, BASIC   3. Mixed hyperlipidemia E78.2 272.2 LIPID PANEL   4. PAF (paroxysmal atrial fibrillation) ([facility]) I48.0 427.31 PROTHROMBIN TIME + INR   5. Sick sinus syndrome ([facility]) I49.5 427.81    6. Long term (current) use of anticoagulants Z79.01 V58.61 PROTHROMBIN TIME + INR   7.  Obesity, Class II, BMI 35-39.9, with comorbidity ([facility]) E66.9 278.00          Uncontrolled type 2 diabetes mellitus with stage 4 chronic kidney disease, with long-term current use of insulin ([facility]  But improved with Hgb A1c decreased from 7.7  -     HM DIABETES FOOT EXAM  -     AMB POC HEMOGLOBIN A1C  -     MICROALBUMIN, UR, RAND W/ MICROALBUMIN/CREA RATIO; Future  -     HEMOGLOBIN A1C WITH EAG; Future    Essential hypertension with goal blood pressure less than 140/90  Hypertension is uncontrolled - medication changes/orders         -     hydrALAZINE (APRESOLINE) 50 mg tablet; Take 2 Tabs by mouth       three (3) times daily.  -     METABOLIC PANEL, BASIC; Future    Mixed hyperlipidemia  Hyperlipidemia is controlled  -     LIPID PANEL; Future    PAF (paroxysmal atrial fibrillation) (HCC)  Stable with no tachycardia. -     PROTHROMBIN TIME + INR; Future    Sick sinus syndrome ([facility]    Long term (current) use of anticoagulants  -     PROTHROMBIN TIME + INR; Future    Obesity, Class II, BMI 35-39.9, with comorbidity ([facility]        Follow-up Disposition:  Return in about 3 months (around 8/22/2017) for DM, HTN, NON-fasting lab one week prior  . lab results and schedule of future lab studies reviewed with patient  reviewed diet, exercise and weight control  cardiovascular risk and specific lipid/LDL goals reviewed  Discussed the patient's BMI with her. The BMI follow up plan is as follows: I have counseled this patient on diet and exercise regimens  I have discussed the diagnosis with the patient and the intended plan as seen in the above orders. Patient is in agreement. The patient has received an after-visit summary and questions were answered concerning future plans. I have discussed medication side effects and warnings with the patient as well.

## 2017-05-24 RX ORDER — HYDRALAZINE HYDROCHLORIDE 100 MG/1
100 TABLET, FILM COATED ORAL 3 TIMES DAILY
Qty: 90 TAB | Refills: 3 | Status: SHIPPED | OUTPATIENT
Start: 2017-05-24 | End: 2017-11-06 | Stop reason: SDUPTHER

## 2017-06-09 RX ORDER — DOXAZOSIN 2 MG/1
TABLET ORAL
Qty: 30 TAB | Refills: 0 | Status: SHIPPED | OUTPATIENT
Start: 2017-06-09 | End: 2017-09-18 | Stop reason: SDUPTHER

## 2017-06-09 RX ORDER — POTASSIUM CHLORIDE 750 MG/1
TABLET, FILM COATED, EXTENDED RELEASE ORAL
Qty: 30 TAB | Refills: 0 | Status: SHIPPED | OUTPATIENT
Start: 2017-06-09 | End: 2017-07-13 | Stop reason: SDUPTHER

## 2017-06-12 RX ORDER — POTASSIUM CHLORIDE 750 MG/1
TABLET, FILM COATED, EXTENDED RELEASE ORAL
Qty: 30 TAB | Refills: 0 | OUTPATIENT
Start: 2017-06-12

## 2017-06-12 NOTE — TELEPHONE ENCOUNTER
Overdue for appt.  Must keep 6-19-17 appt prior to further refills. Just filled K+ on 6-9-17 for 30 tabs, must be seen.

## 2017-06-15 ENCOUNTER — OFFICE VISIT (OUTPATIENT)
Dept: INTERNAL MEDICINE CLINIC | Age: 57
End: 2017-06-15

## 2017-06-15 VITALS
HEIGHT: 69 IN | OXYGEN SATURATION: 96 % | SYSTOLIC BLOOD PRESSURE: 140 MMHG | HEART RATE: 71 BPM | DIASTOLIC BLOOD PRESSURE: 64 MMHG | RESPIRATION RATE: 14 BRPM | TEMPERATURE: 97.8 F | WEIGHT: 266.5 LBS | BODY MASS INDEX: 39.47 KG/M2

## 2017-06-15 DIAGNOSIS — I48.0 PAF (PAROXYSMAL ATRIAL FIBRILLATION) (HCC): ICD-10-CM

## 2017-06-15 DIAGNOSIS — E78.2 MIXED HYPERLIPIDEMIA: ICD-10-CM

## 2017-06-15 DIAGNOSIS — D63.1 ANEMIA IN CHRONIC KIDNEY DISEASE: ICD-10-CM

## 2017-06-15 DIAGNOSIS — Z01.818 PREOP GENERAL PHYSICAL EXAM: Primary | ICD-10-CM

## 2017-06-15 DIAGNOSIS — Z95.0 S/P CARDIAC PACEMAKER PROCEDURE: ICD-10-CM

## 2017-06-15 DIAGNOSIS — H25.012 CATARACT CORTICAL, SENILE, LEFT: ICD-10-CM

## 2017-06-15 DIAGNOSIS — Z79.01 LONG TERM (CURRENT) USE OF ANTICOAGULANTS: ICD-10-CM

## 2017-06-15 DIAGNOSIS — I10 ESSENTIAL HYPERTENSION: ICD-10-CM

## 2017-06-15 DIAGNOSIS — N18.9 ANEMIA IN CHRONIC KIDNEY DISEASE: ICD-10-CM

## 2017-06-15 LAB
INR BLD: 2.4 (ref 1–1.5)
PT POC: 28.5 SECONDS (ref 9.1–12)
VALID INTERNAL CONTROL?: YES

## 2017-06-15 RX ORDER — PREDNISOLONE ACETATE 10 MG/ML
SUSPENSION/ DROPS OPHTHALMIC
COMMUNITY
Start: 2017-05-31 | End: 2017-06-19

## 2017-06-15 RX ORDER — KETOROLAC TROMETHAMINE 5 MG/ML
SOLUTION OPHTHALMIC
COMMUNITY
Start: 2017-05-31 | End: 2017-06-19

## 2017-06-15 NOTE — MR AVS SNAPSHOT
Visit Information Date & Time Provider Department Dept. Phone Encounter #  
 6/15/2017  2:30 PM Helen Duron MD [facility] [phone] [id number] Follow-up Instructions Return if symptoms worsen or fail to improve. Your Appointments 6/15/2017  3:00 PM  
LAB with LAB [facility]) Appt Note: INR  
 [address] [place] [id number] [phone]  
  
   
 8 [facility] [address] 6/19/2017 11:00 AM  
6 MONTH with Isaac Sharif, 205 [facility] Cardiology Associate [address] [facility]) Appt Note: 6mo fu ; also rx refill -lj 6-9-17  
 [address] [place] [id number]  
[phone]  
  
   
 Larry Ng [id number]  
  
    
 8/15/2017  9:30 AM  
LAB with LAB TT 40 [facility]) Appt Note: NON-fasting lab  
 [address] [facility] [address] [phone]  
  
    
 8/22/2017  1:00 PM  
ACUTE CARE with Helen Duron MD  
40 [facility]) Appt Note: 3mth f/u; DM, HTN, NON-fasting lab one week prior [address] [facility] [address] [phone]  
  
   
 8 [facility] [address] Upcoming Health Maintenance Date Due Pneumococcal 19-64 Highest Risk (2 of 3 - PCV13) 11/11/2025* MICROALBUMIN Q1 7/19/2017 INFLUENZA AGE 9 TO ADULT 8/1/2017 LIPID PANEL Q1 8/26/2017 PAP AKA CERVICAL CYTOLOGY 11/17/2017 HEMOGLOBIN A1C Q6M 11/22/2017 EYE EXAM RETINAL OR DILATED Q1 1/30/2018 FOOT EXAM Q1 5/22/2018 COLONOSCOPY 4/21/2020 DTaP/Tdap/Td series (2 - Td) 6/13/2026 *Topic was postponed. The date shown is not the original due date. Allergies as of 6/15/2017  Review Complete On: 6/15/2017 By: Helen Duron MD  
  
 Severity Noted Reaction Type Reactions Ace Inhibitors  03/14/2012    Cough Amlodipine  01/02/2017    Swelling Avapro [Irbesartan]  03/14/2012    Unable to Obtain Bactrim [Sulfamethoxazole-trimethoprim]  04/13/2010    Other (comments) Sore mouth Captopril  04/13/2010    Cough Carvedilol  07/19/2016   Side Effect Diarrhea Contrast Agent [Iodine]  04/13/2010    Itching Fish Containing Products  10/29/2013    Hives Morphine  04/13/2010    Nausea and Vomiting, Swelling Penicillins  04/13/2010    Hives Pravachol [Pravastatin]  04/13/2010    Nausea Only Seafood [Shellfish Containing Products]  03/14/2012    Hives Singulair [Montelukast]  04/13/2010    Other (comments)  
 palpitations Current Immunizations  Reviewed on 6/15/2017 Name Date Influenza Vaccine 10/1/2016, 10/1/2015, 10/5/2014 Influenza Vaccine PF 11/1/2013  8:55 AM  
 Influenza Vaccine Split 11/7/2011  1:58 PM, 11/30/2010 Pneumococcal Vaccine (Unspecified Type) 10/10/2009 Tdap 6/13/2016 Reviewed by Rios Lindsey LPN on 4/84/2417 at  2:03 PM  
You Were Diagnosed With   
  
 Codes Comments Preop general physical exam    -  Primary ICD-10-CM: V00.678 ICD-9-CM: V72.83 Cataract cortical, senile, left     ICD-10-CM: H25.012 
ICD-9-CM: 366.15   
 PAF (paroxysmal atrial fibrillation) (HCC)     ICD-10-CM: I48.0 ICD-9-CM: 427.31 Uncontrolled type 2 diabetes mellitus with stage 4 chronic kidney disease, with long-term current use of insulin (HCC)     ICD-10-CM: E11.22, E11.65, N18.4, Z79.4 ICD-9-CM: 250.52, 585.4, V58.67 Vitals BP Pulse Temp Resp Height(growth percentile) Weight(growth percentile) 140/64 (BP 1 Location: Left arm, BP Patient Position: Sitting) 71 97.8 °F (36.6 °C) (Oral) 14 5' 9\" (1.753 m) 266 lb 8 oz (120.9 kg) SpO2 BMI OB Status Smoking Status 96% 39.36 kg/m2 Postmenopausal Never Smoker Vitals History BMI and BSA Data Body Mass Index Body Surface Area 39.36 kg/m 2 2.43 m 2 Preferred Pharmacy Pharmacy Name Phone [facility] [address], [address] [phone] Your Updated Medication List  
  
   
This list is accurate as of: 6/15/17  2:33 PM.  Always use your most recent med list.  
  
  
  
  
 bumetanide 1 mg tablet Commonly known as:  Fawn Sierras TAKE ONE TABLET BY MOUTH TWICE A DAY (GENERIC FOR Fawn Sierras) busPIRone 10 mg tablet Commonly known as:  BUSPAR Take 1 Tab by mouth three (3) times daily. calcium carbonate 200 mg calcium (500 mg) Chew Commonly known as:  TUMS Take 1 Tab by mouth three (3) times daily (after meals). cholecalciferol (VITAMIN D3) 5,000 unit Tab tablet Commonly known as:  VITAMIN D3 Take 5,000 Units by mouth daily. cloNIDine HCl 0.3 mg tablet Commonly known as:  CATAPRES  
TAKE ONE TABLET BY MOUTH TWICE A DAY  
  
 cyanocobalamin 1,000 mcg sublingual tablet Commonly known as:  VITAMIN B-12 Take 1,000 mcg by mouth daily. doxazosin 2 mg tablet Commonly known as:  CARDURA TAKE ONE TABLET BY MOUTH ONCE NIGHTLY FLINTSTONES COMPLETE (IRON) chewable tablet Generic drug:  flintstones complete Take 1 Tab by mouth daily. hydrALAZINE 100 mg tablet Commonly known as:  APRESOLINE Take 1 Tab by mouth three (3) times daily. insulin glargine 100 unit/mL injection Commonly known as:  LANTUS Inject 15 units under the skin once daily  
  
 insulin lispro 100 unit/mL kwikpen Commonly known as:  HUMALOG  
2 units with dinner for sugars over 200 Insulin Needles (Disposable) 30 gauge x 1/3\" Commonly known as:  NOVOFINE 30  
Use 1 needle daily for injections. Insulin Syringe-Needle U-100 1/2 mL 30 gauge x 5/16 Syrg USE ONE SYRINGE AS NEEDED DAILY  
  
 ketorolac 0.5 % ophthalmic solution Commonly known as:  ACULAR  
  
 metOLazone 5 mg tablet Commonly known as:  Brooks Black  
 Take 1 Tab by mouth daily as needed (take if develop increased LE edema, weight gain > 5 pounds. ). metoprolol succinate 100 mg tablet Commonly known as:  TOPROL-XL  
TAKE TWO TABLETS BY MOUTH DAILY potassium chloride SR 10 mEq tablet Commonly known as:  KLOR-CON 10  
TAKE ONE TABLET BY MOUTH DAILY prednisoLONE acetate 1 % ophthalmic suspension Commonly known as:  PRED FORTE  
  
 sertraline 50 mg tablet Commonly known as:  ZOLOFT Take one and a half tablets daily TYLENOL PM PO Take 2 Tabs by mouth nightly as needed. vitamin A 8,000 unit capsule Take 8,000 Units by mouth daily. warfarin 4 mg tablet Commonly known as:  COUMADIN  
1 tablet Fridays and 0.5 tablets all other days. We Performed the Following AMB POC PT/INR [80975 CPT(R)] Follow-up Instructions Return if symptoms worsen or fail to improve. Patient Instructions Cataract Surgery: Before Your Surgery What is cataract surgery? Cataracts are cloudy areas in the lens of your eye. Your lens is behind the colored part of your eye (iris). Its job is to focus light onto the back of your eye. In some people, cataracts prevent light from reaching the back of the eye. This can cause vision problems. Cataract surgery helps you see better. It replaces your natural lens, which has become cloudy, with a clear artificial one. There are two types of cataract surgery. Phacoemulsification (say \"ddsq-ej-rx-dbp-yig-iwq-MADELEINE-shun\") is the most common type. The doctor makes a small cut in your eye. This cut is called an incision. The doctor uses a special ultrasound tool to break your cloudy lens apart. Sometimes a laser is used too. Then he or she removes the small pieces of the lens through the incision. In most cases, the doctor then inserts an artificial lens through the incision. Most people do not need stitches, because the incision is so small.  If the doctor is not able to put in an artificial lens, you can wear a contact lens or thick glasses in place of your natural lens. Extracapsular extraction is a less common type of cataract surgery. The doctor makes a larger incision to remove the whole lens at once. After the doctor removes the lens, he or she stitches up the incision. Recovery from this type of surgery takes longer. Before either surgery, the doctor puts numbing drops in your eye. Some doctors use a shot instead. You may also get medicine to make you feel relaxed. You probably will not feel much pain. The surgery takes about 20 to 40 minutes. After surgery, you may have a bandage or shield on your eye. You will probably go home from surgery after 1 hour in the recovery room. Most people see better in 1 to 3 days. You may be able to go back to work or your normal routine in a few days. It could take 3 to 10 weeks for your eye to completely heal. After your eye heals, you may still need to wear glasses, especially for reading. Follow-up care is a key part of your treatment and safety. Be sure to make and go to all appointments, and call your doctor if you are having problems. It's also a good idea to know your test results and keep a list of the medicines you take. What happens before surgery? Surgery can be stressful. This information will help you understand what you can expect. And it will help you safely prepare for surgery. Preparing for surgery · Understand exactly what surgery is planned, along with the risks, benefits, and other options. · Tell your doctors ALL the medicines, vitamins, supplements, and herbal remedies you take. Some of these can increase the risk of bleeding or interact with anesthesia. · If you take blood thinners, such as warfarin (Coumadin), clopidogrel (Plavix), or aspirin, be sure to talk to your doctor. He or she will tell you if you should stop taking these medicines before your surgery.  Make sure that you understand exactly what your doctor wants you to do. · Your doctor will tell you which medicines to take or stop before your surgery. You may need to stop taking certain medicines a week or more before surgery. So talk to your doctor as soon as you can. · If you have an advance directive, let your doctor know. It may include a living will and a durable power of  for health care. Bring a copy to the hospital. If you don't have one, you may want to prepare one. It lets your doctor and loved ones know your health care wishes. Doctors advise that everyone prepare these papers before any type of surgery or procedure. What happens on the day of surgery? · Follow the instructions exactly about when to stop eating and drinking. If you don't, your surgery may be canceled. If your doctor told you to take your medicines on the day of surgery, take them with only a sip of water. · Take a bath or shower before you come in for your surgery. Do not apply lotions, perfumes, deodorants, or nail polish. · Take off all jewelry and piercings. And take out contact lenses, if you wear them. At the hospital or surgery center · Bring a picture ID. · The area for surgery is often marked to make sure there are no errors. · You will be kept comfortable and safe by your anesthesia provider. The anesthesia may make you sleep. Or it may just numb the area being worked on. · The surgery will take about 20 to 40 minutes. Going home · Be sure you have someone to drive you home. Anesthesia and pain medicine make it unsafe for you to drive. · You will be given more specific instructions about recovering from your surgery. They will cover things like diet, wound care, follow-up care, driving, and getting back to your normal routine. · You may have a bandage or patch over your eye. You may also have a clear shield over your eye. This prevents you from rubbing it. When should you call your doctor? · You have questions or concerns. · You don't understand how to prepare for your surgery. · You become ill before the surgery (such as fever, flu, or a cold). · You need to reschedule or have changed your mind about having the surgery. Where can you learn more? Go to http://dona-windy.info/. Enter K474 in the search box to learn more about \"Cataract Surgery: Before Your Surgery. \" Current as of: May 23, 2016 Content Version: 11.2 © 1922-2265 [employer]. Care instructions adapted under license by [employer] (which disclaims liability or warranty for this information). If you have questions about a medical condition or this instruction, always ask your healthcare professional. [address] any warranty or liability for your use of this information. Introducing [facility]! [facility] introduces [employer] ([employer]) patient portal. Now you can access parts of your medical record, email your doctor's office, and request medication refills online. 1. In your internet browser, go to [email or web address] 2. Click on the First Time User? Click Here link in the Sign In box. You will see the New Member Sign Up page. 3. Enter your [employer] Access Code exactly as it appears below. You will not need to use this code after youve completed the sign-up process. If you do not sign up before the expiration date, you must request a new code. · [employer] ([employer] Access Code: [email or web address] Expires: 6/28/2017  3:15 PM 
 
4. Enter the last four digits of your Social Security Number (xxxx) and Date of Birth (mm/dd/yyyy) as indicated and click Submit. You will be taken to the next sign-up page. 5. Create a [employer] ([employer]) ID. This will be your [employer] login ID and cannot be changed, so think of one that is secure and easy to remember. 6. Create a [employer] password. You can change your password at any time. 7. Enter your Password Reset Question and Answer. This can be used at a later time if you forget your password. 8. Enter your e-mail address. You will receive e-mail notification when new information is available in [address] 9. Click Sign Up. You can now view and download portions of your medical record. 10. Click the Download Summary menu link to download a portable copy of your medical information. If you have questions, please visit the Frequently Asked Questions section of the [email or web address] website. Remember, [email or web address] is NOT to be used for urgent needs. For medical emergencies, dial 911. Now available from your iPhone and Android! Please provide this summary of care documentation to your next provider. Your primary care clinician is listed as Black Tristan. If you have any questions after today's visit, please call [phone].

## 2017-06-15 NOTE — PROGRESS NOTES
HISTORY OF PRESENT ILLNESS  Marylen Horton is a 61 y.o. female. HPI Presents at request of Dr Jose Cruz Lara for evaluation of medical problems prior to left cataract surgery planned for June 27. She has diabetes mellitus complicated by retinopathy, neuropathy, and nephropathy; Hgb A1c in on  May 22 was 7.4. Most recent creatinine was 1.78 on 5/15. She has hypertension; blood pressures usually run in the 120's/70's. She has hyperlipidemia; lab in August showed HDL 31, LDL 84, triglyceride 178. She has sick sinus syndrome with pacemaker. She has a history of paroxysmal atrial fibrillation, and takes warfarin. She is here for follow-up prothrombin time/INR. She has anxiety. She is doing well on a combination of sertraline 75 mg daily 10 mg twice a day. She denies: racing thoughts, psychomotor agitation, feelings of losing control, difficulty concentrating.     Patient Active Problem List   Diagnosis Code    Breast ca: f/b Dr Rachele Hebert C50.919    Asthma J45.909    Fe deficiency anemia D50.9    Status post ablation of atrial fibrillation Z98.890, Z86.79    Sick sinus syndrome ([facility]) I49.5    S/P cardiac pacemaker procedure Z95.0    Long term (current) use of anticoagulants Z79.01    Mixed hyperlipidemia E78.2    Proliferative diabetic retinopathy ([address] E11.3599    CKD (chronic kidney disease), stage IV ([facility]) N18.4    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 ([facility]) M88.74    Systolic murmur Y90.0    Essential hypertension with goal blood pressure less than 140/90 I10    PAF (paroxysmal atrial fibrillation) ([facility]) I48.0    Anemia D64.9    Anemia in chronic kidney disease N18.9, D63.1    Uncontrolled type 2 diabetes mellitus with stage 4 chronic kidney disease, with long-term current use of insulin ([facility]) E11.22, E11.65, N18.4, Z79.4    Obesity, Class II, BMI 35-39.9, with comorbidity ([facility]) E66.9     Past Medical History:   Diagnosis Date    Acquired lymphedema     Right arm    Arrhythmia     Asthma     Atrial fibrillation (HCC)     Dr. Bess Rayo and Dr. Rivas Bolus Atrial flutter ([facility]     Cancer [facility])     bilateral breast    Chronic kidney disease     Diabetes mellitus type II     Dizziness     Essential hypertension     Hyperlipidemia     Hypertension     Long term (current) use of anticoagulants     Morbid obesity ([facility] 3/14/2012    Osteoarthritis     Pacemaker     Sick sinus syndrome ([facility]      Past Surgical History:   Procedure Laterality Date    ABDOMEN SURGERY PROC UNLISTED      gastric bypass    GASTRIC BYPASS,OBESE<150CM SAW-EN-Y  7/08    [place]    HX AFIB ABLATION      HX CARPAL TUNNEL RELEASE      HX CERVICAL FUSION  1994    HX DILATION AND CURETTAGE  1992    HX MASTECTOMY  1998    RIGHT MODIFIED RADICAL     HX MOHS PROCEDURES  1995    right    HX ORTHOPAEDIC      ight knee surgery    HX PACEMAKER      IR INSERT TUNL CVC W PORT OVER 5 YEARS      [address]    LASER SURGERY OF EYE      NEEDLE BIOPSY LIVER,W OTHR [address]  8.21.07    GA [address] AND 1994    US GUIDE ASP OVARIAN CYST ABD APPROACH  8.21.07    RIGHT      Social History     Social History    Marital status:      Spouse name: N/A    Number of children: N/A    Years of education: N/A     Social History Main Topics    Smoking status: Never Smoker    Smokeless tobacco: Never Used    Alcohol use Yes      Comment: rare    Drug use: No    Sexual activity: Not Asked     Other Topics Concern    None     Social History Narrative     Family History   Problem Relation Age of Onset    Cancer Mother     Heart Disease Mother     Alcohol abuse Father     Diabetes Brother     Hypertension Brother      Allergies   Allergen Reactions    Ace Inhibitors Cough    Amlodipine Swelling    Avapro [Irbesartan] Unable to Obtain    Bactrim [Sulfamethoxazole-Trimethoprim] Other (comments)     Sore mouth    Captopril Cough    Carvedilol Diarrhea  Contrast Agent [Iodine] Itching    Fish Containing Products Hives    Morphine Nausea and Vomiting and Swelling    Penicillins Hives    Pravachol [Pravastatin] Nausea Only    Seafood [Shellfish Containing Products] Hives    Singulair [Montelukast] Other (comments)     palpitations     Current Outpatient Prescriptions   Medication Sig Dispense Refill    ketorolac (ACULAR) 0.5 % ophthalmic solution       prednisoLONE acetate (PRED FORTE) 1 % ophthalmic suspension       doxazosin (CARDURA) 2 mg tablet TAKE ONE TABLET BY MOUTH ONCE NIGHTLY 30 Tab 0    potassium chloride SR (KLOR-CON 10) 10 mEq tablet TAKE ONE TABLET BY MOUTH DAILY 30 Tab 0    hydrALAZINE (APRESOLINE) 100 mg tablet Take 1 Tab by mouth three (3) times daily. 90 Tab 3    warfarin (COUMADIN) 4 mg tablet 1 tablet Fridays and 0.5 tablets all other days. 30 Tab 5    sertraline (ZOLOFT) 50 mg tablet Take one and a half tablets daily 45 Tab 5    busPIRone (BUSPAR) 10 mg tablet Take 1 Tab by mouth three (3) times daily. 60 Tab 3    metoprolol succinate (TOPROL-XL) 100 mg tablet TAKE TWO TABLETS BY MOUTH DAILY 60 Tab 5    bumetanide (BUMEX) 1 mg tablet TAKE ONE TABLET BY MOUTH TWICE A DAY (GENERIC FOR BUMEX) 60 Tab 6    cloNIDine HCl (CATAPRES) 0.3 mg tablet TAKE ONE TABLET BY MOUTH TWICE A DAY 60 Tab 10    Insulin Syringe-Needle U-100 1/2 mL 30 gauge x 5/16 syrg USE ONE SYRINGE AS NEEDED DAILY 100 Syringe 2    insulin glargine (LANTUS) 100 unit/mL injection Inject 15 units under the skin once daily 10 mL 11    metolazone (ZAROXOLYN) 5 mg tablet Take 1 Tab by mouth daily as needed (take if develop increased LE edema, weight gain > 5 pounds. ). 30 Tab 1    cholecalciferol, VITAMIN D3, (VITAMIN D3) 5,000 unit tab tablet Take 5,000 Units by mouth daily.  vitamin A 8,000 unit capsule Take 8,000 Units by mouth daily.  ACETAMINOPHEN/DIPHENHYDRAMINE (TYLENOL PM PO) Take 2 Tabs by mouth nightly as needed.       insulin lispro (HUMALOG) 100 unit/mL kwikpen 2 units with dinner for sugars over 200 1 Package 0    Insulin Needles, Disposable, (NOVOFINE 30) 30 x 1/3 \" Use 1 needle daily for injections. 1 Package 11    cyanocobalamin (VITAMIN B-12) 1,000 mcg sublingual tablet Take 1,000 mcg by mouth daily.  calcium carbonate (TUMS) 200 mg calcium (500 mg) Chew Take 1 Tab by mouth three (3) times daily (after meals).  flintstones complete (FLINTSTONES COMPLETE) chewable tablet Take 1 Tab by mouth daily. Review of Systems   Constitutional: Positive for malaise/fatigue. Negative for weight loss. HENT: Negative for congestion. Respiratory: Negative for cough and shortness of breath. Cardiovascular: Positive for leg swelling (no worse than usual). Negative for chest pain, palpitations, orthopnea and claudication. Gastrointestinal: Negative for constipation, diarrhea and heartburn. Genitourinary: Negative for frequency and urgency. Musculoskeletal: Positive for joint pain (knees). Negative for back pain. Skin: Negative for itching and rash. Neurological: Negative for dizziness, tingling and focal weakness. Psychiatric/Behavioral: The patient is not nervous/anxious. Visit Vitals    /64 (BP 1 Location: Left arm, BP Patient Position: Sitting)    Pulse 71    Temp 97.8 °F (36.6 °C) (Oral)    Resp 14    Ht 5' 9\" (1.753 m)    Wt 266 lb 8 oz (120.9 kg)    SpO2 96%    BMI 39.36 kg/m2     Physical Exam   Constitutional: She is oriented to person, place, and time. She appears well-developed and well-nourished. HENT:   Head: Normocephalic and atraumatic. Eyes: Conjunctivae are normal. Pupils are equal, round, and reactive to light. Neck: Neck supple. Carotid bruit is not present. No thyromegaly present. Cardiovascular: Normal rate, regular rhythm and normal heart sounds. PMI is not displaced. Exam reveals no gallop. No murmur heard.   Pulses:       Dorsalis pedis pulses are 2+ on the right side, and 2+ on the left side. Posterior tibial pulses are 2+ on the right side, and 2+ on the left side. Pulmonary/Chest: Effort normal. She has no wheezes. She has no rhonchi. She has no rales. Abdominal: Soft. Normal appearance. She exhibits no abdominal bruit and no mass. There is no hepatosplenomegaly. There is no tenderness. Musculoskeletal: She exhibits no edema. Lymphadenopathy:     She has no cervical adenopathy. Right: No supraclavicular adenopathy present. Left: No supraclavicular adenopathy present. Neurological: She is alert and oriented to person, place, and time. No sensory deficit. Skin: Skin is warm, dry and intact. No rash noted. Psychiatric: She has a normal mood and affect. Her behavior is normal.   Nursing note and vitals reviewed. Results for orders placed or performed in visit on 06/15/17   AMB POC PT/INR   Result Value Ref Range    VALID INTERNAL CONTROL POC Yes     Prothrombin time (POC) 28.5 (A) 9.1 - 12 seconds    INR POC 2.4 (A) 1 - 1.5     Lab Results   Component Value Date/Time    Hemoglobin A1c 7.6 08/26/2016 11:26 AM    Hemoglobin A1c (POC) 7.4 05/22/2017 03:49 PM     Lab Results   Component Value Date/Time    Sodium 143 05/15/2017 03:43 PM    Potassium 4.5 05/15/2017 03:43 PM    Chloride 105 05/15/2017 03:43 PM    CO2 19 05/15/2017 03:43 PM    Anion gap 10 05/21/2016 04:38 PM    Glucose 192 05/15/2017 03:43 PM    BUN 31 05/15/2017 03:43 PM    Creatinine 1.78 05/15/2017 03:43 PM    BUN/Creatinine ratio 17 05/15/2017 03:43 PM    GFR est AA 36 05/15/2017 03:43 PM    GFR est non-AA 31 05/15/2017 03:43 PM    Calcium 8.6 05/15/2017 03:43 PM     Lab Results   Component Value Date/Time    Cholesterol, total 151 08/26/2016 11:26 AM    HDL Cholesterol 31 08/26/2016 11:26 AM    LDL, calculated 84 08/26/2016 11:26 AM    VLDL, calculated 36 08/26/2016 11:26 AM    Triglyceride 178 08/26/2016 11:26 AM       ASSESSMENT and PLAN    ICD-10-CM ICD-9-CM    1.  Preop general physical exam Z01.818 V72.83    2. Cataract cortical, senile, left H25.012 366.15    3. PAF (paroxysmal atrial fibrillation) ([facility]) I48.0 427.31 AMB POC PT/INR   4. Uncontrolled type 2 diabetes mellitus with stage 4 chronic kidney disease, with long-term current use of insulin ([facility]) E11.22 250.52     E11.65 585.4     N18.4 V58.67     Z79.4     5. Essential hypertension I10 401.9    6. Mixed hyperlipidemia E78.2 272.2    7. S/P cardiac pacemaker procedure Z95.0 V45.01    8. Anemia in chronic kidney disease N18.9 285.21     D63.1 585.9    9. Long term (current) use of anticoagulants Z79.01 V58.61          Preop general physical exam  Medical conditions are under satisfactory control for planned surgery. Cataract cortical, senile, left    PAF (paroxysmal atrial fibrillation) ([facility])  Stable with controlled ventricular response. -     AMB POC PT/INR    Uncontrolled type 2 diabetes mellitus with stage 4 chronic kidney disease, with long-term current use of insulin ([facility])  Hgb A1c not at goal, but is less than 8. Essential hypertension  Hypertension is controlled    Mixed hyperlipidemia  Hyperlipidemia is controlled    S/P cardiac pacemaker procedure    Anemia in chronic kidney disease  Had recent iron infusion. Long term (current) use of anticoagulants      Follow-up Disposition:  Return if symptoms worsen or fail to improve.  lab results and schedule of future lab studies reviewed with patient  reviewed diet, exercise and weight control  cardiovascular risk and specific lipid/LDL goals reviewed  Discussed the patient's BMI with her. The BMI follow up plan is as follows: I have counseled this patient on diet and exercise regimens  I have discussed the diagnosis with the patient and the intended plan as seen in the above orders. Patient is in agreement. The patient has received an after-visit summary and questions were answered concerning future plans.   I have discussed medication side effects and warnings with the patient as well.

## 2017-06-15 NOTE — PROGRESS NOTES
Reviewed record In preparation for visit and have obtained necessary documentation. Has info on advanced directive but has not filled them out. 1. Have you been to the ER, urgent care clinic or hospitalized since your last visit? No     2. Have you seen or consulted any other health care providers outside of the [address] [place] [address] since your last visit? Include any pap smears or colon screening.  Iron infusion    Health Maintenance reviewed:

## 2017-06-15 NOTE — PATIENT INSTRUCTIONS
Cataract Surgery: Before Your Surgery  What is cataract surgery? Cataracts are cloudy areas in the lens of your eye. Your lens is behind the colored part of your eye (iris). Its job is to focus light onto the back of your eye. In some people, cataracts prevent light from reaching the back of the eye. This can cause vision problems. Cataract surgery helps you see better. It replaces your natural lens, which has become cloudy, with a clear artificial one. There are two types of cataract surgery. Phacoemulsification (say \"qhwl-va-ko-ilq-ack-aps-MADELEINE-shun\") is the most common type. The doctor makes a small cut in your eye. This cut is called an incision. The doctor uses a special ultrasound tool to break your cloudy lens apart. Sometimes a laser is used too. Then he or she removes the small pieces of the lens through the incision. In most cases, the doctor then inserts an artificial lens through the incision. Most people do not need stitches, because the incision is so small. If the doctor is not able to put in an artificial lens, you can wear a contact lens or thick glasses in place of your natural lens. Extracapsular extraction is a less common type of cataract surgery. The doctor makes a larger incision to remove the whole lens at once. After the doctor removes the lens, he or she stitches up the incision. Recovery from this type of surgery takes longer. Before either surgery, the doctor puts numbing drops in your eye. Some doctors use a shot instead. You may also get medicine to make you feel relaxed. You probably will not feel much pain. The surgery takes about 20 to 40 minutes. After surgery, you may have a bandage or shield on your eye. You will probably go home from surgery after 1 hour in the recovery room. Most people see better in 1 to 3 days. You may be able to go back to work or your normal routine in a few days.  It could take 3 to 10 weeks for your eye to completely heal. After your eye heals, you may still need to wear glasses, especially for reading. Follow-up care is a key part of your treatment and safety. Be sure to make and go to all appointments, and call your doctor if you are having problems. It's also a good idea to know your test results and keep a list of the medicines you take. What happens before surgery? Surgery can be stressful. This information will help you understand what you can expect. And it will help you safely prepare for surgery. Preparing for surgery  · Understand exactly what surgery is planned, along with the risks, benefits, and other options. · Tell your doctors ALL the medicines, vitamins, supplements, and herbal remedies you take. Some of these can increase the risk of bleeding or interact with anesthesia. · If you take blood thinners, such as warfarin (Coumadin), clopidogrel (Plavix), or aspirin, be sure to talk to your doctor. He or she will tell you if you should stop taking these medicines before your surgery. Make sure that you understand exactly what your doctor wants you to do. · Your doctor will tell you which medicines to take or stop before your surgery. You may need to stop taking certain medicines a week or more before surgery. So talk to your doctor as soon as you can. · If you have an advance directive, let your doctor know. It may include a living will and a durable power of  for health care. Bring a copy to the hospital. If you don't have one, you may want to prepare one. It lets your doctor and loved ones know your health care wishes. Doctors advise that everyone prepare these papers before any type of surgery or procedure. What happens on the day of surgery? · Follow the instructions exactly about when to stop eating and drinking. If you don't, your surgery may be canceled. If your doctor told you to take your medicines on the day of surgery, take them with only a sip of water. · Take a bath or shower before you come in for your surgery. Do not apply lotions, perfumes, deodorants, or nail polish. · Take off all jewelry and piercings. And take out contact lenses, if you wear them. At the hospital or surgery center  · Bring a picture ID. · The area for surgery is often marked to make sure there are no errors. · You will be kept comfortable and safe by your anesthesia provider. The anesthesia may make you sleep. Or it may just numb the area being worked on. · The surgery will take about 20 to 40 minutes. Going home  · Be sure you have someone to drive you home. Anesthesia and pain medicine make it unsafe for you to drive. · You will be given more specific instructions about recovering from your surgery. They will cover things like diet, wound care, follow-up care, driving, and getting back to your normal routine. · You may have a bandage or patch over your eye. You may also have a clear shield over your eye. This prevents you from rubbing it. When should you call your doctor? · You have questions or concerns. · You don't understand how to prepare for your surgery. · You become ill before the surgery (such as fever, flu, or a cold). · You need to reschedule or have changed your mind about having the surgery. Where can you learn more? Go to http://dona-windy.info/. Enter K474 in the search box to learn more about \"Cataract Surgery: Before Your Surgery. \"  Current as of: May 23, 2016  Content Version: 11.2  © 3984-3714 [employer]. Care instructions adapted under license by [employer] (which disclaims liability or warranty for this information). If you have questions about a medical condition or this instruction, always ask your healthcare professional. [place] [address] any warranty or liability for your use of this information.

## 2017-06-19 ENCOUNTER — OFFICE VISIT (OUTPATIENT)
Dept: CARDIOLOGY CLINIC | Age: 57
End: 2017-06-19

## 2017-06-19 VITALS
HEIGHT: 69 IN | DIASTOLIC BLOOD PRESSURE: 70 MMHG | RESPIRATION RATE: 18 BRPM | SYSTOLIC BLOOD PRESSURE: 134 MMHG | BODY MASS INDEX: 39.01 KG/M2 | WEIGHT: 263.4 LBS | HEART RATE: 61 BPM | OXYGEN SATURATION: 97 %

## 2017-06-19 DIAGNOSIS — N18.9 ANEMIA IN CHRONIC KIDNEY DISEASE: ICD-10-CM

## 2017-06-19 DIAGNOSIS — Z86.79 STATUS POST ABLATION OF ATRIAL FIBRILLATION: ICD-10-CM

## 2017-06-19 DIAGNOSIS — Z95.0 S/P CARDIAC PACEMAKER PROCEDURE: ICD-10-CM

## 2017-06-19 DIAGNOSIS — D63.1 ANEMIA IN CHRONIC KIDNEY DISEASE: ICD-10-CM

## 2017-06-19 DIAGNOSIS — Z98.890 STATUS POST ABLATION OF ATRIAL FIBRILLATION: ICD-10-CM

## 2017-06-19 DIAGNOSIS — E11.3552: ICD-10-CM

## 2017-06-19 DIAGNOSIS — I48.0 PAF (PAROXYSMAL ATRIAL FIBRILLATION) (HCC): ICD-10-CM

## 2017-06-19 DIAGNOSIS — Z79.01 LONG TERM (CURRENT) USE OF ANTICOAGULANTS: ICD-10-CM

## 2017-06-19 DIAGNOSIS — N18.4 CKD (CHRONIC KIDNEY DISEASE), STAGE IV (HCC): ICD-10-CM

## 2017-06-19 DIAGNOSIS — Z79.4: ICD-10-CM

## 2017-06-19 DIAGNOSIS — I10 ESSENTIAL HYPERTENSION: Primary | ICD-10-CM

## 2017-06-19 DIAGNOSIS — E78.2 MIXED HYPERLIPIDEMIA: ICD-10-CM

## 2017-06-19 NOTE — PROGRESS NOTES
[address] [facility]        [phone]                             NEW PATIENT HPI/FOLLOW-UP    NAME:  Lila Gill   :   1960   MRN:   [id number]   PCP:  Ewa Webber MD           Subjective: The patient is a 61y.o. year old female  who returns for a routine follow-up. Since the last visit, patient reports no new symptoms. Watching salt very closely. Occasionally will eat Manuel Albert but knows to use Metolazone if fluid retention. Denies change in exercise tolerance, chest pain, edema, medication intolerance, palpitations, shortness of breath, PND/orthopnea wheezing, sputum, syncope, dizziness or light headedness. Doing satisfactorily.       Review of Systems:     [] Unable to obtain  ROS due to  []mental status change  []sedated   []intubated   [x]Total of 12 systems reviewed as follows:  Constitutional: negative fever, negative chills, negative weight loss  Eyes:   negative visual changes  ENT:   negative sore throat, tongue or lip swelling  Chest/Resp:  negative cough, wheezing, negative dyspnea,tenderness  Cards:  negative for chest pain, decreased exercise endurance, palpitations, +lower extremity edema,PND,orthopnea,syncope,dizziness,lightheadedness  GI:   negative for nausea, vomiting, diarrhea, and abdominal pain  :  negative for frequency, dysuria  Integument:  negative for rash and pruritus  Heme:  negative for easy bruising and gum/nose bleeding  Musculoskel: negative for myalgias,  back pain and muscle weakness  Neuro:  negative for headaches, dizziness, vertigo  Psych:  negative for feelings of anxiety, depression     Past Medical History:   Diagnosis Date    Acquired lymphedema     Right arm    Arrhythmia     Asthma     Atrial fibrillation (HCC)     Dr. Erich Juarez and Dr. Lidia Beckett [facility] flutter [facility]     Cancer [facility])     bilateral breast    Chronic kidney disease     Diabetes mellitus type II     Dizziness     Essential hypertension  Hyperlipidemia     Hypertension     Long term (current) use of anticoagulants     Morbid obesity ([address] 3/14/2012    Osteoarthritis     Pacemaker     Sick sinus syndrome [facility])      Patient Active Problem List    Diagnosis Date Noted    Obesity, Class II, BMI 35-39.9, with comorbidity ([address] 05/01/2017    Uncontrolled type 2 diabetes mellitus with stage 4 chronic kidney disease, with long-term current use of insulin ([address] 01/16/2017    PAF (paroxysmal atrial fibrillation) ([address] 11/28/2016    Anemia 11/28/2016    Anemia in chronic kidney disease 11/28/2016    Essential hypertension 10/59/2693    Systolic murmur 71/66/1530    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 ([address] 87/91/9106    CKD (chronic kidney disease), stage IV ([address] 06/09/2012    Proliferative diabetic retinopathy ([address] 06/04/2012    Mixed hyperlipidemia 05/22/2012    Long term (current) use of anticoagulants 04/11/2012    S/P cardiac pacemaker procedure 04/03/2012    Sick sinus syndrome ([address] 04/02/2012    Status post ablation of atrial fibrillation 12/15/2011    Fe deficiency anemia 04/14/2010    Breast ca: f/b Dr Adelina Ryan 04/13/2010    Asthma 04/13/2010      Past Surgical History:   Procedure Laterality Date    ABDOMEN SURGERY PROC UNLISTED      gastric bypass    GASTRIC BYPASS,OBESE<150CM SAW-EN-Y  7/08    [facility]    HX AFIB ABLATION      HX CARPAL TUNNEL RELEASE      HX CERVICAL FUSION  1994    HX DILATION AND CURETTAGE  1992    HX MASTECTOMY  1998    RIGHT MODIFIED RADICAL     HX MOHS PROCEDURES  1995    right    HX ORTHOPAEDIC      ight knee surgery    HX PACEMAKER      IR INSERT TUNL CVC W PORT OVER 5 YEARS      LAMINECTOMY,CERVICAL  1994    LASER SURGERY OF EYE      NEEDLE BIOPSY LIVER,W OTHR [address]  8.21.07    MI Shan 9462 AND 1994    US GUIDE ASP OVARIAN CYST ABD APPROACH  8.21.07    RIGHT      Allergies   Allergen Reactions    Ace Inhibitors Cough    Amlodipine Swelling    Avapro [Irbesartan] Unable to Obtain    Bactrim [Sulfamethoxazole-Trimethoprim] Other (comments)     Sore mouth    Captopril Cough    Carvedilol Diarrhea    Contrast Agent [Iodine] Itching    Fish Containing Products Hives    Morphine Nausea and Vomiting and Swelling    Penicillins Hives    Pravachol [Pravastatin] Nausea Only    Seafood [Shellfish Containing Products] Hives    Singulair [Montelukast] Other (comments)     palpitations      Family History   Problem Relation Age of Onset    Cancer Mother     Heart Disease Mother     Alcohol abuse Father     Diabetes Brother     Hypertension Brother       Social History     Social History    Marital status:      Spouse name: N/A    Number of children: N/A    Years of education: N/A     Occupational History    Not on file. Social History Main Topics    Smoking status: Never Smoker    Smokeless tobacco: Never Used    Alcohol use Yes      Comment: rare    Drug use: No    Sexual activity: Not on file     Other Topics Concern    Not on file     Social History Narrative      Current Outpatient Prescriptions   Medication Sig    ketorolac (ACULAR) 0.5 % ophthalmic solution     prednisoLONE acetate (PRED FORTE) 1 % ophthalmic suspension     doxazosin (CARDURA) 2 mg tablet TAKE ONE TABLET BY MOUTH ONCE NIGHTLY    potassium chloride SR (KLOR-CON 10) 10 mEq tablet TAKE ONE TABLET BY MOUTH DAILY    hydrALAZINE (APRESOLINE) 100 mg tablet Take 1 Tab by mouth three (3) times daily.  warfarin (COUMADIN) 4 mg tablet 1 tablet Fridays and 0.5 tablets all other days.  sertraline (ZOLOFT) 50 mg tablet Take one and a half tablets daily    busPIRone (BUSPAR) 10 mg tablet Take 1 Tab by mouth three (3) times daily.     metoprolol succinate (TOPROL-XL) 100 mg tablet TAKE TWO TABLETS BY MOUTH DAILY    bumetanide (BUMEX) 1 mg tablet TAKE ONE TABLET BY MOUTH TWICE A DAY (GENERIC FOR BUMEX)    cloNIDine HCl (CATAPRES) 0.3 mg tablet TAKE ONE TABLET BY MOUTH TWICE A DAY    Insulin Syringe-Needle U-100 1/2 mL 30 gauge x 5/16 syrg USE ONE SYRINGE AS NEEDED DAILY    insulin glargine (LANTUS) 100 unit/mL injection Inject 15 units under the skin once daily    metolazone (ZAROXOLYN) 5 mg tablet Take 1 Tab by mouth daily as needed (take if develop increased LE edema, weight gain > 5 pounds. ).  cholecalciferol, VITAMIN D3, (VITAMIN D3) 5,000 unit tab tablet Take 5,000 Units by mouth daily.  vitamin A 8,000 unit capsule Take 8,000 Units by mouth daily.  ACETAMINOPHEN/DIPHENHYDRAMINE (TYLENOL PM PO) Take 2 Tabs by mouth nightly as needed.  insulin lispro (HUMALOG) 100 unit/mL kwikpen 2 units with dinner for sugars over 200    Insulin Needles, Disposable, (NOVOFINE 30) 30 x 1/3 \" Use 1 needle daily for injections.  cyanocobalamin (VITAMIN B-12) 1,000 mcg sublingual tablet Take 1,000 mcg by mouth daily.  calcium carbonate (TUMS) 200 mg calcium (500 mg) Chew Take 1 Tab by mouth three (3) times daily (after meals).  flintstones complete (FLINTSTONES COMPLETE) chewable tablet Take 1 Tab by mouth daily. No current facility-administered medications for this visit. I have reviewed the nurses notes, vitals, problem list, allergy list, medical history, family medical, social history and medications. Objective:     Physical Exam:     Vitals:    06/19/17 1057   BP: 134/70   Pulse: 61   Resp: 18   SpO2: 97%   Weight: 263 lb 6.4 oz (119.5 kg)   Height: 5' 9\" (1.753 m)    Body mass index is 38.9 kg/(m^2). General: Well developed,obese in no acute distress. HEENT: No carotid bruits, no JVD, trach is midline. Heart:  Normal S1/S2 negative S3 or S4. Regular, no murmur, gallop or rub.   Respiratory: Clear bilaterally, no wheezing or rales  Abdomen:   Soft, non-tender, bowel sounds are active.   Extremities:  Trace leg edema, normal cap refill, no cyanosis. Neuro: A&Ox3, speech clear, gait stable.    Skin: Skin color is normal. No rashes or lesions. No diaphoresis. Vascular: 2+ pulses symmetric in all extremities        Data Review:       Cardiographics:    EKG: NSR,poor anterior R wave progression,LAE,LVH    Cardiology Labs:    Results for orders placed or performed during the hospital encounter of 04/09/14   EKG, 12 LEAD, INITIAL   Result Value Ref Range    Ventricular Rate 82 BPM    Atrial Rate 82 BPM    P-R Interval 168 ms    QRS Duration 120 ms    Q-T Interval 454 ms    QTC Calculation (Bezet) 530 ms    Calculated P Axis 58 degrees    Calculated R Axis -25 degrees    Calculated T Axis 94 degrees    Diagnosis       Normal sinus rhythm  Left ventricular hypertrophy with QRS widening  Cannot rule out Septal infarct , age undetermined  When compared with ECG of 07-NOV-2013 12:02,  Sinus rhythm has replaced Electronic atrial pacemaker  Minimal criteria for Septal infarct are now present  Confirmed by Geronimo Zabala MD, Debbie Self ([id number]) on 4/9/2014 12:03:30 PM       Lab Results   Component Value Date/Time    Cholesterol, total 151 08/26/2016 11:26 AM    HDL Cholesterol 31 08/26/2016 11:26 AM    LDL, calculated 84 08/26/2016 11:26 AM    Triglyceride 178 08/26/2016 11:26 AM       Lab Results   Component Value Date/Time    Sodium 143 05/15/2017 03:43 PM    Potassium 4.5 05/15/2017 03:43 PM    Chloride 105 05/15/2017 03:43 PM    CO2 19 05/15/2017 03:43 PM    Anion gap 10 05/21/2016 04:38 PM    Glucose 192 05/15/2017 03:43 PM    BUN 31 05/15/2017 03:43 PM    Creatinine 1.78 05/15/2017 03:43 PM    BUN/Creatinine ratio 17 05/15/2017 03:43 PM    GFR est AA 36 05/15/2017 03:43 PM    GFR est non-AA 31 05/15/2017 03:43 PM    Calcium 8.6 05/15/2017 03:43 PM    Bilirubin, total <0.2 05/15/2017 03:43 PM    AST (SGOT) 16 05/15/2017 03:43 PM    Alk.  phosphatase 130 05/15/2017 03:43 PM    Protein, total 6.9 05/15/2017 03:43 PM    Albumin 4.0 05/15/2017 03:43 PM    Globulin 4.1 05/21/2016 04:38 PM    A-G Ratio 1.4 05/15/2017 03:43 PM    ALT (SGPT) 13 05/15/2017 03:43 PM          Assessment:       ICD-10-CM ICD-9-CM    1. Essential hypertension I10 401.9 AMB POC EKG ROUTINE W/ 12 LEADS, INTER & REP   2. PAF (paroxysmal atrial fibrillation) ([facility]) I48.0 427.31    3. Status post ablation of atrial fibrillation Z98.890 V45.89     Z86.79     4. Mixed hyperlipidemia E78.2 272.2    5. Obesity, Class II, BMI 35-39.9, with comorbidity ([facility]) E66.9 278.00    6. Anemia in chronic kidney disease N18.9 285.21     D63.1 585.9    7. Controlled type 2 diabetes mellitus with stable proliferative retinopathy of left eye, with long-term current use of insulin ([facility]) E11.3552 250.50     Z79.4 362.02      V58.67    8. S/P cardiac pacemaker procedure Z95.0 V45.01    9. Long term (current) use of anticoagulants Z79.01 V58.61    10. CKD (chronic kidney disease), stage IV ([facility]) N18.4 585.4          Discussion: Patient presents at this time stable from a cardiac perspective. Pleased with present cardiac status. Plan: 1. Continue same meds. Lipid profile and labs followed by PCP--at goal essentially--trigs slightly up. Comfort Cartagena 2.Encouraged to exercise to tolerance, lose weight and follow low fat, low cholesterol, low sodium predominantly Plant-based (consider Mediterranean) diet. Call with questions or concerns. Will follow up any test results by phone and/or f/u here in office if needed. Comfort Cartagena 3.Follow up: 6 months    I have discussed the diagnosis with the patient and the intended plan as seen in the above orders. The patient has received an after-visit summary and questions were answered concerning future plans. I have discussed any concerning medication side effects and warnings with the patient as well.     Dianne Alan MD  6/19/2017

## 2017-06-27 RX ORDER — BUMETANIDE 1 MG/1
TABLET ORAL
Qty: 60 TAB | Refills: 5 | Status: SHIPPED | OUTPATIENT
Start: 2017-06-27 | End: 2018-01-07 | Stop reason: SDUPTHER

## 2017-07-13 RX ORDER — POTASSIUM CHLORIDE 750 MG/1
TABLET, FILM COATED, EXTENDED RELEASE ORAL
Qty: 30 TAB | Refills: 5 | Status: SHIPPED | OUTPATIENT
Start: 2017-07-13 | End: 2018-01-31 | Stop reason: SDUPTHER

## 2017-07-31 ENCOUNTER — APPOINTMENT (OUTPATIENT)
Dept: INTERNAL MEDICINE CLINIC | Age: 57
End: 2017-07-31

## 2017-07-31 DIAGNOSIS — Z79.01 LONG TERM (CURRENT) USE OF ANTICOAGULANTS: ICD-10-CM

## 2017-07-31 DIAGNOSIS — I48.0 PAF (PAROXYSMAL ATRIAL FIBRILLATION) (HCC): ICD-10-CM

## 2017-08-01 DIAGNOSIS — I48.0 PAF (PAROXYSMAL ATRIAL FIBRILLATION) (HCC): Primary | ICD-10-CM

## 2017-08-01 LAB
INR PPP: 1.9 (ref 0.8–1.2)
PROTHROMBIN TIME: 19.8 SEC (ref 9.1–12)

## 2017-08-01 RX ORDER — CLONIDINE HYDROCHLORIDE 0.3 MG/1
TABLET ORAL
Qty: 60 TAB | Refills: 5 | Status: SHIPPED | OUTPATIENT
Start: 2017-08-01 | End: 2018-01-23 | Stop reason: SDUPTHER

## 2017-08-01 NOTE — PROGRESS NOTES
Inform patient INR is therapeutic. No change in warfarin dose. Repeat in 3 weeks this time as the value has drifted downward compared to previous test results. Want to be sure it does not fall further. Sarita Morales

## 2017-08-16 ENCOUNTER — APPOINTMENT (OUTPATIENT)
Dept: INTERNAL MEDICINE CLINIC | Age: 57
End: 2017-08-16

## 2017-08-16 DIAGNOSIS — I10 ESSENTIAL HYPERTENSION WITH GOAL BLOOD PRESSURE LESS THAN 140/90: ICD-10-CM

## 2017-08-16 DIAGNOSIS — I48.0 PAF (PAROXYSMAL ATRIAL FIBRILLATION) (HCC): ICD-10-CM

## 2017-08-16 DIAGNOSIS — E78.2 MIXED HYPERLIPIDEMIA: ICD-10-CM

## 2017-08-17 ENCOUNTER — TELEPHONE (OUTPATIENT)
Dept: INTERNAL MEDICINE CLINIC | Age: 57
End: 2017-08-17

## 2017-08-17 DIAGNOSIS — I48.0 PAF (PAROXYSMAL ATRIAL FIBRILLATION) (HCC): ICD-10-CM

## 2017-08-17 LAB
ALBUMIN/CREAT UR: 577.6 MG/G CREAT (ref 0–30)
BUN SERPL-MCNC: 25 MG/DL (ref 6–24)
BUN/CREAT SERPL: 12 (ref 9–23)
CALCIUM SERPL-MCNC: 9 MG/DL (ref 8.7–10.2)
CHLORIDE SERPL-SCNC: 102 MMOL/L (ref 96–106)
CHOLEST SERPL-MCNC: 158 MG/DL (ref 100–199)
CO2 SERPL-SCNC: 18 MMOL/L (ref 18–29)
CREAT SERPL-MCNC: 2.06 MG/DL (ref 0.57–1)
CREAT UR-MCNC: 16.5 MG/DL
EST. AVERAGE GLUCOSE BLD GHB EST-MCNC: 157 MG/DL
GLUCOSE SERPL-MCNC: 87 MG/DL (ref 65–99)
HBA1C MFR BLD: 7.1 % (ref 4.8–5.6)
HDLC SERPL-MCNC: 29 MG/DL
INR PPP: 1.5 (ref 0.8–1.2)
INTERPRETATION, 910389: NORMAL
INTERPRETATION: NORMAL
LDLC SERPL CALC-MCNC: 85 MG/DL (ref 0–99)
Lab: NORMAL
MICROALBUMIN UR-MCNC: 95.3 UG/ML
PDF IMAGE, 910387: NORMAL
POTASSIUM SERPL-SCNC: 4.5 MMOL/L (ref 3.5–5.2)
PROTHROMBIN TIME: 15.2 SEC (ref 9.1–12)
SODIUM SERPL-SCNC: 141 MMOL/L (ref 134–144)
TRIGL SERPL-MCNC: 221 MG/DL (ref 0–149)
VLDLC SERPL CALC-MCNC: 44 MG/DL (ref 5–40)

## 2017-08-17 RX ORDER — WARFARIN 4 MG/1
TABLET ORAL
Qty: 30 TAB | Refills: 5
Start: 2017-08-17 | End: 2017-08-22 | Stop reason: SDUPTHER

## 2017-08-17 NOTE — PROGRESS NOTES
Inform patient INR is too low. Confirm current dose of warfarin 4 mg tablets, 1 tablet Fridays and 0.5 tablets all other days. . Change warfarin 4 mg tablets to 2 tablets today (8/17), 1.5 tablets tomorrow (8/18), then 1 tablet Saturdays and Tuesdays and 0.5 tablets all other days. Discuss consistent intake of high vitamin K containing foods. Repeat INR in 2 weeks.

## 2017-08-18 NOTE — PROGRESS NOTES
Verified two patient identifiers, name and . Pt notified of result, currently taking 4 mg coumadin 1 tablet  and 0.5 tablets all other days. She will make the change warfarin 4 mg tablets to 2 tablets today (), 1.5 tablets tomorrow (), then 1 tablet  and  and 0.5 tablets all other days.

## 2017-08-22 ENCOUNTER — OFFICE VISIT (OUTPATIENT)
Dept: INTERNAL MEDICINE CLINIC | Age: 57
End: 2017-08-22

## 2017-08-22 VITALS
OXYGEN SATURATION: 95 % | SYSTOLIC BLOOD PRESSURE: 142 MMHG | HEIGHT: 69 IN | HEART RATE: 80 BPM | WEIGHT: 268 LBS | RESPIRATION RATE: 16 BRPM | BODY MASS INDEX: 39.69 KG/M2 | TEMPERATURE: 98.2 F | DIASTOLIC BLOOD PRESSURE: 60 MMHG

## 2017-08-22 DIAGNOSIS — E11.22 CONTROLLED TYPE 2 DIABETES MELLITUS WITH STAGE 4 CHRONIC KIDNEY DISEASE, WITHOUT LONG-TERM CURRENT USE OF INSULIN (HCC): Primary | ICD-10-CM

## 2017-08-22 DIAGNOSIS — I49.5 SICK SINUS SYNDROME (HCC): ICD-10-CM

## 2017-08-22 DIAGNOSIS — E78.2 MIXED HYPERLIPIDEMIA: ICD-10-CM

## 2017-08-22 DIAGNOSIS — E11.3552: ICD-10-CM

## 2017-08-22 DIAGNOSIS — I48.0 PAF (PAROXYSMAL ATRIAL FIBRILLATION) (HCC): ICD-10-CM

## 2017-08-22 DIAGNOSIS — I10 ESSENTIAL HYPERTENSION: ICD-10-CM

## 2017-08-22 DIAGNOSIS — N18.4 CONTROLLED TYPE 2 DIABETES MELLITUS WITH STAGE 4 CHRONIC KIDNEY DISEASE, WITHOUT LONG-TERM CURRENT USE OF INSULIN (HCC): Primary | ICD-10-CM

## 2017-08-22 DIAGNOSIS — Z95.0 S/P CARDIAC PACEMAKER PROCEDURE: ICD-10-CM

## 2017-08-22 DIAGNOSIS — Z79.4: ICD-10-CM

## 2017-08-22 LAB
INR BLD: 4.6 (ref 1–1.5)
PT POC: 54.8 SECONDS (ref 9.1–12)
VALID INTERNAL CONTROL?: YES

## 2017-08-22 RX ORDER — WARFARIN 4 MG/1
TABLET ORAL
Qty: 30 TAB | Refills: 5
Start: 2017-08-22 | End: 2017-09-21 | Stop reason: SDUPTHER

## 2017-08-22 NOTE — MR AVS SNAPSHOT
Visit Information Date & Time Provider Department Dept. Phone Encounter #  
 8/22/2017  1:00 PM Mai Tineo MD [facility] [phone] [id number] Follow-up Instructions Return in about 3 months (around 11/22/2017) for DM, HTN, CKD  non-fasting lab work a week before. Also non-fasting lab on 8/31. Upcoming Health Maintenance Date Due INFLUENZA AGE 9 TO ADULT 8/1/2017 PAP AKA CERVICAL CYTOLOGY 11/17/2017 Pneumococcal 19-64 Highest Risk (2 of 3 - PCV13) 11/11/2025* EYE EXAM RETINAL OR DILATED Q1 1/30/2018 HEMOGLOBIN A1C Q6M 2/16/2018 FOOT EXAM Q1 5/22/2018 MICROALBUMIN Q1 8/16/2018 LIPID PANEL Q1 8/16/2018 COLONOSCOPY 4/21/2020 DTaP/Tdap/Td series (2 - Td) 6/13/2026 *Topic was postponed. The date shown is not the original due date. Allergies as of 8/22/2017  Review Complete On: 8/22/2017 By: Mai Tineo MD  
  
 Severity Noted Reaction Type Reactions Ace Inhibitors  03/14/2012    Cough Amlodipine  01/02/2017    Swelling Avapro [Irbesartan]  03/14/2012    Unable to Obtain Bactrim [Sulfamethoxazole-trimethoprim]  04/13/2010    Other (comments) Sore mouth Captopril  04/13/2010    Cough Carvedilol  07/19/2016   Side Effect Diarrhea Contrast Agent [Iodine]  04/13/2010    Itching Fish Containing Products  10/29/2013    Hives Morphine  04/13/2010    Nausea and Vomiting, Swelling Penicillins  04/13/2010    Hives Pravachol [Pravastatin]  04/13/2010    Nausea Only Seafood [Shellfish Containing Products]  03/14/2012    Hives Singulair [Montelukast]  04/13/2010    Other (comments)  
 palpitations Current Immunizations  Reviewed on 8/22/2017 Name Date Influenza Vaccine 10/1/2016, 10/1/2015, 10/5/2014 Influenza Vaccine PF 11/1/2013  8:55 AM  
 Influenza Vaccine Split 11/7/2011  1:58 PM, 11/30/2010 Tdap 6/13/2016 ZZZ-RETIRED (DO NOT USE) Pneumococcal Vaccine (Unspecified Type) 10/10/2009 Reviewed by Ruthann Hester LPN on 1/96/1141 at 11:94 PM  
You Were Diagnosed With   
  
 Codes Comments Uncontrolled type 2 diabetes mellitus with stage 4 chronic kidney disease, with long-term current use of insulin (HCC)    -  Primary ICD-10-CM: E11.22, E11.65, N18.4, Z79.4 ICD-9-CM: 250.52, 585.4, V58.67 PAF (paroxysmal atrial fibrillation) (HCC)     ICD-10-CM: I48.0 ICD-9-CM: 427.31 Mixed hyperlipidemia     ICD-10-CM: E78.2 ICD-9-CM: 272.2 Essential hypertension     ICD-10-CM: I10 
ICD-9-CM: 401.9 Sick sinus syndrome (HCC)     ICD-10-CM: I49.5 ICD-9-CM: 427.81 S/P cardiac pacemaker procedure     ICD-10-CM: Z95.0 ICD-9-CM: V45.01 Vitals BP Pulse Temp Resp Height(growth percentile) Weight(growth percentile) 142/60 (BP 1 Location: Left arm, BP Patient Position: Sitting) 80 98.2 °F (36.8 °C) (Oral) 16 5' 9\" (1.753 m) 268 lb (121.6 kg) SpO2 BMI OB Status Smoking Status 95% 39.58 kg/m2 Postmenopausal Never Smoker Vitals History BMI and BSA Data Body Mass Index Body Surface Area  
 39.58 kg/m 2 2.43 m 2 Preferred Pharmacy Pharmacy Name Phone [facility] [address] [place] [address] [phone] Your Updated Medication List  
  
   
This list is accurate as of: 8/22/17  1:44 PM.  Always use your most recent med list.  
  
  
  
  
 bumetanide 1 mg tablet Commonly known as:  Collene Rocher TAKE ONE TABLET BY MOUTH TWICE A DAY (GENERIC FOR Collene Rocher) busPIRone 10 mg tablet Commonly known as:  BUSPAR  
TAKE ONE TABLET BY MOUTH THREE TIMES A DAY  
  
 calcium carbonate 200 mg calcium (500 mg) Chew Commonly known as:  TUMS Take 1 Tab by mouth three (3) times daily (after meals). cholecalciferol (VITAMIN D3) 5,000 unit Tab tablet Commonly known as:  VITAMIN D3 Take 5,000 Units by mouth daily. cloNIDine HCl 0.3 mg tablet Commonly known as:  CATAPRES  
TAKE ONE TABLET BY MOUTH TWICE A DAY  
  
 cyanocobalamin 1,000 mcg sublingual tablet Commonly known as:  VITAMIN B-12 Take 1,000 mcg by mouth daily. doxazosin 2 mg tablet Commonly known as:  CARDURA TAKE ONE TABLET BY MOUTH ONCE NIGHTLY FLINTSTONES COMPLETE (IRON) chewable tablet Generic drug:  flintstones complete Take 1 Tab by mouth daily. hydrALAZINE 100 mg tablet Commonly known as:  APRESOLINE Take 1 Tab by mouth three (3) times daily. insulin glargine 100 unit/mL injection Commonly known as:  LANTUS Inject 15 units under the skin once daily  
  
 insulin lispro 100 unit/mL kwikpen Commonly known as:  HUMALOG  
2 units with dinner for sugars over 200 Insulin Needles (Disposable) 30 gauge x 1/3\" Commonly known as:  NOVOFINE 30  
Use 1 needle daily for injections. Insulin Syringe-Needle U-100 1/2 mL 30 gauge x 5/16 Syrg USE ONE SYRINGE AS NEEDED DAILY  
  
 metOLazone 5 mg tablet Commonly known as:  Luvenia Bunde Take 1 Tab by mouth daily as needed (take if develop increased LE edema, weight gain > 5 pounds. ). metoprolol succinate 100 mg tablet Commonly known as:  TOPROL-XL  
TAKE TWO TABLETS BY MOUTH DAILY potassium chloride SR 10 mEq tablet Commonly known as:  KLOR-CON 10  
TAKE ONE TABLET BY MOUTH DAILY  
  
 sertraline 50 mg tablet Commonly known as:  ZOLOFT Take one and a half tablets daily TYLENOL PM PO Take 2 Tabs by mouth nightly as needed. vitamin A 8,000 unit capsule Take 8,000 Units by mouth daily. warfarin 4 mg tablet Commonly known as:  COUMADIN Take none on 8/22, then 0.5 tablet on 8/23 and 8/24,  then 1 tablet on Tuesdays and Fridays and 0.5 tablets all other days. We Performed the Following AMB POC PT/INR [86318 CPT(R)] Follow-up Instructions Return in about 3 months (around 11/22/2017) for DM, HTN, CKD  non-fasting lab work a week before. Also non-fasting lab on 8/31. To-Do List   
 08/31/2017 Lab:  PROTHROMBIN TIME + INR   
  
 11/22/2017 Lab:  HEMOGLOBIN A1C WITH EAG   
  
 11/22/2017 Lab:  METABOLIC PANEL, BASIC Patient Instructions Warfarin: Take none on 8/22, then 0.5 tablet on 8/23 and 8/24,  then 1 tablet on Tuesdays and Fridays and 0.5 tablets all other days. Protime/INR in about 10 days (8/31) Office visit in 3 months with non-fasting lab work a week before visit. Introducing [facility]! Ivanna Horner introduces [employer] patient portal. Now you can access parts of your medical record, email your doctor's office, and request medication refills online. 1. In your internet browser, go to [email or web address] 2. Click on the First Time User? Click Here link in the Sign In box. You will see the New Member Sign Up page. 3. Enter your [employer] Access Code exactly as it appears below. You will not need to use this code after youve completed the sign-up process. If you do not sign up before the expiration date, you must request a new code. · [employer] Access Code: [email or web address] Expires: 11/20/2017  1:44 PM 
 
4. Enter the last four digits of your Social Security Number (xxxx) and Date of Birth (mm/dd/yyyy) as indicated and click Submit. You will be taken to the next sign-up page. 5. Create a [employer] ID. This will be your [employer] login ID and cannot be changed, so think of one that is secure and easy to remember. 6. Create a [employer] password. You can change your password at any time. 7. Enter your Password Reset Question and Answer. This can be used at a later time if you forget your password. 8. Enter your e-mail address. You will receive e-mail notification when new information is available in [address] 9. Click Sign Up.  You can now view and download portions of your medical record. 10. Click the Download Summary menu link to download a portable copy of your medical information. If you have questions, please visit the Frequently Asked Questions section of the [employer] website. Remember, [employer] is NOT to be used for urgent needs. For medical emergencies, dial 911. Now available from your iPhone and Android! Please provide this summary of care documentation to your next provider. Your primary care clinician is listed as Anil Glass. If you have any questions after today's visit, please call [phone].

## 2017-08-22 NOTE — PROGRESS NOTES
Reviewed record In preparation for visit and have obtained necessary documentation. Has info on advanced directive but has not filled them out. 1. Have you been to the ER, urgent care clinic or hospitalized since your last visit? No     2. Have you seen or consulted any other health care providers outside of the [address] [place] [address] since your last visit? Include any pap smears or colon screening. Dr Shantal Hernandez reviewed with provider.     Health Maintenance reviewed:

## 2017-08-22 NOTE — PATIENT INSTRUCTIONS
Warfarin: Take none on 8/22, then 0.5 tablet on 8/23 and 8/24,  then 1 tablet on Tuesdays and Fridays and 0.5 tablets all other days. Protime/INR in about 10 days (8/31)  Office visit in 3 months with non-fasting lab work a week before visit.

## 2017-08-22 NOTE — PROGRESS NOTES
HISTORY OF PRESENT ILLNESS  Chanda Hensley is a 61 y.o. female. HPI   She presents for follow up of diabetes mellitus with retinopathy, hypertension with chronic kidney disease and secondary anemia, hyperlipidemia, obesity, paroxysmal atrial fibrillation and SSS with pacemaker. Diabetic ROS - medication compliance: compliant most of the time,      home glucose monitoring: is not performed,      home BP Monitoring: is not measured at home. further diabetic ROS: no polyuria or polydipsia, no medication side effects noted, has dysesthesias in the feet, two low blood sugars since last seen (both did not eat usual amount). .   Diet and Lifestyle: generally follows a low fat low cholesterol diet, generally follows a low sodium diet, follows a diabetic diet regularly, exercises sporadically, nonsmoker  Cardiovascular ROS:  She complains of lower extremity edema. She denies palpitations, orthopnea, exertional chest pressure/discomfort, claudication, dyspnea on exertion, dizziness      Fell over rug at [facility] right side and has huge bruise. 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 Checking to see if the store will cover her injury expenses.        Patient Active Problem List   Diagnosis Code    Breast ca: f/b Dr Vincent Malave C50.919    Asthma J45.909    Fe deficiency anemia D50.9    Status post ablation of atrial fibrillation Z98.890, Z86.79    Sick sinus syndrome ([facility]) I49.5    S/P cardiac pacemaker procedure Z95.0    Long term (current) use of anticoagulants Z79.01    Mixed hyperlipidemia E78.2    Proliferative diabetic retinopathy ([facility] E11.3599    CKD (chronic kidney disease), stage IV ([facility]) N18.4    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 ([facility]) D31.22    Systolic murmur O48.8    Essential hypertension I10    PAF (paroxysmal atrial fibrillation) ([facility]) I48.0    Anemia D64.9    Anemia in chronic kidney disease N18.9, D63.1    Uncontrolled type 2 diabetes mellitus with stage 4 chronic kidney disease, with long-term current use of insulin (HCC) E11.22, E11.65, N18.4, Z79.4    Obesity, Class II, BMI 35-39.9, with comorbidity E66.9     Past Medical History:   Diagnosis Date    Acquired lymphedema     Right arm    Arrhythmia     Asthma     Atrial fibrillation (HCC)     Dr. Darylene Scale and Dr. Edson Valencia Atrial flutter ([facility]     Cancer [facility])     bilateral breast    Chronic kidney disease     Diabetes mellitus type II     Dizziness     Essential hypertension     Hyperlipidemia     Hypertension     Long term (current) use of anticoagulants     Morbid obesity ([facility] 3/14/2012    Osteoarthritis     Pacemaker     Sick sinus syndrome ([facility]      Past Surgical History:   Procedure Laterality Date    ABDOMEN SURGERY PROC UNLISTED      gastric bypass    GASTRIC BYPASS,OBESE<150CM SAW-EN-Y  7/08    hutcher    HX AFIB ABLATION      HX CARPAL TUNNEL RELEASE      HX CERVICAL FUSION  1994    HX DILATION AND CURETTAGE  1992    HX MASTECTOMY  1998    RIGHT MODIFIED RADICAL     HX MOHS PROCEDURES  1995    right    HX ORTHOPAEDIC      ight knee surgery    HX PACEMAKER      IR INSERT TUNL CVC W PORT OVER 5 YEARS      Romel Oh 82 OF EYE      NEEDLE BIOPSY LIVER,W OTHR [address]  8.21.07    HI [address] AND 1994    US GUIDE ASP OVARIAN CYST ABD APPROACH  8.21.07    RIGHT      Social History     Social History    Marital status:      Spouse name: N/A    Number of children: N/A    Years of education: N/A     Social History Main Topics    Smoking status: Never Smoker    Smokeless tobacco: Never Used    Alcohol use Yes      Comment: rare    Drug use: No    Sexual activity: Not Asked     Other Topics Concern    None     Social History Narrative     Family History   Problem Relation Age of Onset    Cancer Mother     Heart Disease Mother     Alcohol abuse Father     Diabetes Brother     Hypertension Brother      Allergies   Allergen Reactions    Ace Inhibitors Cough    Amlodipine Swelling    Avapro [Irbesartan] Unable to Obtain    Bactrim [Sulfamethoxazole-Trimethoprim] Other (comments)     Sore mouth    Captopril Cough    Carvedilol Diarrhea    Contrast Agent [Iodine] Itching    Fish Containing Products Hives    Morphine Nausea and Vomiting and Swelling    Penicillins Hives    Pravachol [Pravastatin] Nausea Only    Seafood [Shellfish Containing Products] Hives    Singulair [Montelukast] Other (comments)     palpitations     Current Outpatient Prescriptions   Medication Sig Dispense Refill    warfarin (COUMADIN) 4 mg tablet Take 2 tablets on 8/17, 1.5 on 8/18, then 1 tablet on Tuesdays and Fridays and 0.5 tablets all other days. 30 Tab 5    busPIRone (BUSPAR) 10 mg tablet TAKE ONE TABLET BY MOUTH THREE TIMES A DAY 60 Tab 11    cloNIDine HCl (CATAPRES) 0.3 mg tablet TAKE ONE TABLET BY MOUTH TWICE A DAY 60 Tab 5    metoprolol succinate (TOPROL-XL) 100 mg tablet TAKE TWO TABLETS BY MOUTH DAILY 60 Tab 11    potassium chloride SR (KLOR-CON 10) 10 mEq tablet TAKE ONE TABLET BY MOUTH DAILY 30 Tab 5    bumetanide (BUMEX) 1 mg tablet TAKE ONE TABLET BY MOUTH TWICE A DAY (GENERIC FOR BUMEX) 60 Tab 5    doxazosin (CARDURA) 2 mg tablet TAKE ONE TABLET BY MOUTH ONCE NIGHTLY 30 Tab 0    hydrALAZINE (APRESOLINE) 100 mg tablet Take 1 Tab by mouth three (3) times daily. 90 Tab 3    sertraline (ZOLOFT) 50 mg tablet Take one and a half tablets daily 45 Tab 5    Insulin Syringe-Needle U-100 1/2 mL 30 gauge x 5/16 syrg USE ONE SYRINGE AS NEEDED DAILY 100 Syringe 2    insulin glargine (LANTUS) 100 unit/mL injection Inject 15 units under the skin once daily 10 mL 11    metolazone (ZAROXOLYN) 5 mg tablet Take 1 Tab by mouth daily as needed (take if develop increased LE edema, weight gain > 5 pounds. ).  30 Tab 1    cholecalciferol, VITAMIN D3, (VITAMIN D3) 5,000 unit tab tablet Take 5,000 Units by mouth daily.  vitamin A 8,000 unit capsule Take 8,000 Units by mouth daily.  ACETAMINOPHEN/DIPHENHYDRAMINE (TYLENOL PM PO) Take 2 Tabs by mouth nightly as needed.  insulin lispro (HUMALOG) 100 unit/mL kwikpen 2 units with dinner for sugars over 200 1 Package 0    Insulin Needles, Disposable, (NOVOFINE 30) 30 x 1/3 \" Use 1 needle daily for injections. 1 Package 11    cyanocobalamin (VITAMIN B-12) 1,000 mcg sublingual tablet Take 1,000 mcg by mouth daily.  calcium carbonate (TUMS) 200 mg calcium (500 mg) Chew Take 1 Tab by mouth three (3) times daily (after meals).  flintstones complete (FLINTSTONES COMPLETE) chewable tablet Take 1 Tab by mouth daily. Review of Systems   Constitutional: Negative for malaise/fatigue and weight loss. Gastrointestinal: Negative for constipation, diarrhea and heartburn. Musculoskeletal: Positive for back pain and joint pain. Neurological: Positive for sensory change (lack of feeling in feet. ). Negative for dizziness, tingling and focal weakness. Visit Vitals    /60 (BP 1 Location: Left arm, BP Patient Position: Sitting)    Pulse 80    Temp 98.2 °F (36.8 °C) (Oral)    Resp 16    Ht 5' 9\" (1.753 m)    Wt 268 lb (121.6 kg)    SpO2 95%    BMI 39.58 kg/m2     Physical Exam   Constitutional: She is oriented to person, place, and time. She appears well-developed and well-nourished. HENT:   Head: Normocephalic and atraumatic. Eyes: Conjunctivae are normal. Pupils are equal, round, and reactive to light. Neck: Neck supple. Carotid bruit is not present. No thyromegaly present. Cardiovascular: Normal rate, regular rhythm and normal heart sounds. PMI is not displaced. Exam reveals no gallop. No murmur heard. Pulses:       Dorsalis pedis pulses are 1+ on the right side, and 1+ on the left side. Posterior tibial pulses are 1+ on the right side, and 1+ on the left side. Pulmonary/Chest: Effort normal. She has no wheezes. She has no rhonchi. She has no rales. Abdominal: Soft. Normal appearance. She exhibits no abdominal bruit and no mass. There is no hepatosplenomegaly. There is no tenderness. Musculoskeletal: She exhibits edema (2-3 + on right, 1-2+ on left). Lymphadenopathy:     She has no cervical adenopathy. Right: No supraclavicular adenopathy present. Left: No supraclavicular adenopathy present. Neurological: She is alert and oriented to person, place, and time. No sensory deficit. Skin: Skin is warm, dry and intact. No rash noted. Psychiatric: She has a normal mood and affect. Her behavior is normal.   Nursing note and vitals reviewed. Results for orders placed or performed in visit on 08/22/17   AMB POC PT/INR   Result Value Ref Range    VALID INTERNAL CONTROL POC Yes     Prothrombin time (POC) 54.8 (A) 9.1 - 12 seconds    INR POC 4.6 (A) 1 - 1.5     Lab Results   Component Value Date/Time    Hemoglobin A1c 7.1 08/16/2017 08:16 AM    Hemoglobin A1c (POC) 7.4 05/22/2017 03:49 PM     Lab Results   Component Value Date/Time    Cholesterol, total 158 08/16/2017 08:16 AM    HDL Cholesterol 29 08/16/2017 08:16 AM    LDL, calculated 85 08/16/2017 08:16 AM    VLDL, calculated 44 08/16/2017 08:16 AM    Triglyceride 221 08/16/2017 08:16 AM     Lab Results   Component Value Date/Time    Sodium 141 08/16/2017 08:16 AM    Potassium 4.5 08/16/2017 08:16 AM    Chloride 102 08/16/2017 08:16 AM    CO2 18 08/16/2017 08:16 AM    Anion gap 10 05/21/2016 04:38 PM    Glucose 87 08/16/2017 08:16 AM    BUN 25 08/16/2017 08:16 AM    Creatinine 2.06 08/16/2017 08:16 AM    BUN/Creatinine ratio 12 08/16/2017 08:16 AM    GFR est AA 30 08/16/2017 08:16 AM    GFR est non-AA 26 08/16/2017 08:16 AM    Calcium 9.0 08/16/2017 08:16 AM    Bilirubin, total <0.2 05/15/2017 03:43 PM    AST (SGOT) 16 05/15/2017 03:43 PM    Alk.  phosphatase 130 05/15/2017 03:43 PM    Protein, total 6.9 05/15/2017 03:43 PM    Albumin 4.0 05/15/2017 03:43 PM    Globulin 4.1 05/21/2016 04:38 PM    A-G Ratio 1.4 05/15/2017 03:43 PM    ALT (SGPT) 13 05/15/2017 03:43 PM         ASSESSMENT and PLAN    ICD-10-CM ICD-9-CM    1. Controlled type 2 diabetes mellitus with stage 4 chronic kidney disease, without long-term current use of insulin ([facility]) E11.22 250.40 AMB POC PT/INR    N18.4 585.4 HEMOGLOBIN A1C WITH EAG   2. PAF (paroxysmal atrial fibrillation) ([facility]) I48.0 427.31 AMB POC PT/INR      warfarin (COUMADIN) 4 mg tablet      PROTHROMBIN TIME + INR   3. Mixed hyperlipidemia E78.2 272.2    4. Essential hypertension B46 379.5 METABOLIC PANEL, BASIC   5. Sick sinus syndrome ([facility]) I49.5 427.81    6. S/P cardiac pacemaker procedure Z95.0 V45.01    7. Obesity, Class II, BMI 35-39.9, with comorbidity E66.9 278.00    8. Controlled type 2 diabetes mellitus with stable proliferative retinopathy of left eye, with long-term current use of insulin ([facility] E11.3552 250.50     Z79.4 362.02      V58.67      Diagnoses and all orders for this visit:    1. Controlled type 2 diabetes mellitus with stage 4 chronic kidney disease, without long-term current use of insulin ([facility])  -     AMB POC PT/INR  -     HEMOGLOBIN A1C WITH EAG; Future    2. PAF (paroxysmal atrial fibrillation) ([facility])  -     AMB POC PT/INR  -     warfarin (COUMADIN) 4 mg tablet; Take none on 8/22, then 0.5 tablet on 8/23 and 8/24,  then 1 tablet on Tuesdays and Fridays and 0.5 tablets all other days.  -     PROTHROMBIN TIME + INR; Future    3. Mixed hyperlipidemia  Hyperlipidemia is controlled    4. Essential hypertension  Blood pressure is at goal and creatinine is stable. Blood pressure has been very hard to control and there really are no new agents we can add. Has not kept regular follow up with nephrology and declines to do so. -     METABOLIC PANEL, BASIC; Future    5. Sick sinus syndrome ([address]    6. S/P cardiac pacemaker procedure    7. Obesity, Class II, BMI 35-39.9, with comorbidity    8.  Controlled type 2 diabetes mellitus with stable proliferative retinopathy of left eye, with long-term current use of insulin ([facility]      Follow-up Disposition:  Return in about 3 months (around 11/22/2017) for DM, HTN, CKD  non-fasting lab work a week before. Also non-fasting lab on 8/31.  lab results and schedule of future lab studies reviewed with patient  reviewed diet, exercise and weight control  cardiovascular risk and specific lipid/LDL goals reviewed  Discussed the patient's BMI with her. The BMI follow up plan is as follows: I have counseled this patient on diet and exercise regimens  I have discussed the diagnosis with the patient and the intended plan as seen in the above orders. Patient is in agreement. The patient has received an after-visit summary and questions were answered concerning future plans. I have discussed medication side effects and warnings with the patient as well.

## 2017-08-31 ENCOUNTER — APPOINTMENT (OUTPATIENT)
Dept: INTERNAL MEDICINE CLINIC | Age: 57
End: 2017-08-31

## 2017-08-31 DIAGNOSIS — I48.0 PAF (PAROXYSMAL ATRIAL FIBRILLATION) (HCC): ICD-10-CM

## 2017-09-01 ENCOUNTER — TELEPHONE (OUTPATIENT)
Dept: INTERNAL MEDICINE CLINIC | Age: 57
End: 2017-09-01

## 2017-09-01 DIAGNOSIS — F43.0 ANXIETY AS ACUTE REACTION TO GROSS STRESS: ICD-10-CM

## 2017-09-01 DIAGNOSIS — I48.0 PAF (PAROXYSMAL ATRIAL FIBRILLATION) (HCC): Primary | ICD-10-CM

## 2017-09-01 DIAGNOSIS — F41.1 ANXIETY AS ACUTE REACTION TO GROSS STRESS: ICD-10-CM

## 2017-09-01 LAB
INR PPP: 1.7 (ref 0.8–1.2)
PROTHROMBIN TIME: 17.6 SEC (ref 9.1–12)

## 2017-09-01 RX ORDER — SERTRALINE HYDROCHLORIDE 50 MG/1
TABLET, FILM COATED ORAL
Qty: 45 TAB | Refills: 4 | Status: SHIPPED | OUTPATIENT
Start: 2017-09-01 | End: 2018-01-07 | Stop reason: SDUPTHER

## 2017-09-01 NOTE — PROGRESS NOTES
Inform patient INR is too low. Confirm current dose of warfarin 4 mg tablets 1 tablet on Tuesdays and Fridays and 0.5 tablets all other days . Change warfarin 4 mg tablets to 1.5 tablets today, then resume prior dose of 1 tablet on Tuesdays and Fridays and 0.5 tablets all other days. Repeat INR in  2 weeks.

## 2017-09-06 NOTE — PROGRESS NOTES
Verified two patient identifiers, name and . Pt notified of result, currently taking 4 mg coumadin 1 tablet on  and  and 0.5 tablets all other days. She will take 1.5 tablets tonight then resume schedule.  Next lab is 2017 03:45 PM.

## 2017-09-11 RX ORDER — INSULIN GLARGINE 100 [IU]/ML
INJECTION, SOLUTION SUBCUTANEOUS
Qty: 10 ML | Refills: 10 | Status: SHIPPED | OUTPATIENT
Start: 2017-09-11 | End: 2018-10-15 | Stop reason: SDUPTHER

## 2017-09-18 RX ORDER — DOXAZOSIN 2 MG/1
TABLET ORAL
Qty: 30 TAB | Refills: 8 | Status: SHIPPED | OUTPATIENT
Start: 2017-09-18 | End: 2018-02-27 | Stop reason: SDUPTHER

## 2017-09-20 ENCOUNTER — APPOINTMENT (OUTPATIENT)
Dept: INTERNAL MEDICINE CLINIC | Age: 57
End: 2017-09-20

## 2017-09-20 DIAGNOSIS — I48.0 PAF (PAROXYSMAL ATRIAL FIBRILLATION) (HCC): ICD-10-CM

## 2017-09-21 ENCOUNTER — TELEPHONE (OUTPATIENT)
Dept: INTERNAL MEDICINE CLINIC | Age: 57
End: 2017-09-21

## 2017-09-21 DIAGNOSIS — I48.0 PAF (PAROXYSMAL ATRIAL FIBRILLATION) (HCC): ICD-10-CM

## 2017-09-21 LAB
INR PPP: 1.8 (ref 0.8–1.2)
PROTHROMBIN TIME: 18.6 SEC (ref 9.1–12)

## 2017-09-21 RX ORDER — WARFARIN 4 MG/1
TABLET ORAL
Qty: 30 TAB | Refills: 5
Start: 2017-09-21 | End: 2017-12-07 | Stop reason: SDUPTHER

## 2017-09-21 NOTE — PROGRESS NOTES
Inform patient INR is too low. Confirm current dose of warfarin 4 mg tablets 1 tablet on Tuesdays and Fridays and 0.5 tablets all other days. . Change warfarin 4 mg tablets to 1 tablet on Sundays, Tuesdays and Fridays and 0.5 tablets all other days. .  Repeat INR in  3 weeks.

## 2017-09-22 NOTE — PROGRESS NOTES
Verified two patient identifiers, name and . Pt notified of result, currently taking 4 mg coumadin 1 tablet on  and  and 0.5 tablets all other days. She will make the change warfarin 4 mg tablets to 1 tablet on Sundays,  and  and 0.5 tablets all other days. Next lab is  03:45 PM. Pt had no further questions at this time.

## 2017-10-12 ENCOUNTER — APPOINTMENT (OUTPATIENT)
Dept: INTERNAL MEDICINE CLINIC | Age: 57
End: 2017-10-12

## 2017-10-12 DIAGNOSIS — I48.0 PAF (PAROXYSMAL ATRIAL FIBRILLATION) (HCC): ICD-10-CM

## 2017-10-13 LAB
INR PPP: 1.9 (ref 0.8–1.2)
PROTHROMBIN TIME: 19.5 SEC (ref 9.1–12)

## 2017-10-13 NOTE — PROGRESS NOTES
On warfarin 4 mg tablets -  1 tablet on Sundays, Tuesdays and Fridays and 0.5 tablets all other days - NEW dose is 4mg on Saturday, Sunday, Tuesday, Friday and 1/2 tab all other days with repeat INR in 7-10 days

## 2017-10-17 NOTE — PROGRESS NOTES
Pt notified of dose change. Asked for me to leave the info again on her VM as she did not have anything to writ it down with. Done.

## 2017-11-06 RX ORDER — HYDRALAZINE HYDROCHLORIDE 100 MG/1
TABLET, FILM COATED ORAL
Qty: 90 TAB | Refills: 2 | Status: SHIPPED | OUTPATIENT
Start: 2017-11-06 | End: 2018-03-14 | Stop reason: SDUPTHER

## 2017-11-14 ENCOUNTER — TELEPHONE (OUTPATIENT)
Dept: INTERNAL MEDICINE CLINIC | Age: 57
End: 2017-11-14

## 2017-11-14 DIAGNOSIS — I48.0 PAF (PAROXYSMAL ATRIAL FIBRILLATION) (HCC): Primary | ICD-10-CM

## 2018-01-07 DIAGNOSIS — F41.1 ANXIETY AS ACUTE REACTION TO GROSS STRESS: ICD-10-CM

## 2018-01-07 DIAGNOSIS — F43.0 ANXIETY AS ACUTE REACTION TO GROSS STRESS: ICD-10-CM

## 2018-01-08 RX ORDER — BUMETANIDE 1 MG/1
TABLET ORAL
Qty: 60 TAB | Refills: 0 | Status: SHIPPED | OUTPATIENT
Start: 2018-01-08 | End: 2018-02-07 | Stop reason: SDUPTHER

## 2018-01-08 RX ORDER — SERTRALINE HYDROCHLORIDE 50 MG/1
75 TABLET, FILM COATED ORAL DAILY
Qty: 45 TAB | Refills: 0 | Status: SHIPPED | OUTPATIENT
Start: 2018-01-08 | End: 2018-02-05 | Stop reason: SDUPTHER

## 2018-01-09 NOTE — TELEPHONE ENCOUNTER
She is overdue for Protime/INR. Also needs to schedule a follow up appointment for diabetes and other chronic problems.

## 2018-01-09 NOTE — TELEPHONE ENCOUNTER
Message left on that INR appointment has been set up for tomorrow at 3:45 pm as she is over due. Routine appointment with lab prior scheduled.

## 2018-01-15 ENCOUNTER — APPOINTMENT (OUTPATIENT)
Dept: INTERNAL MEDICINE CLINIC | Age: 58
End: 2018-01-15

## 2018-01-15 DIAGNOSIS — I48.0 PAF (PAROXYSMAL ATRIAL FIBRILLATION) (HCC): ICD-10-CM

## 2018-01-16 ENCOUNTER — TELEPHONE (OUTPATIENT)
Dept: INTERNAL MEDICINE CLINIC | Age: 58
End: 2018-01-16

## 2018-01-16 DIAGNOSIS — I48.0 PAF (PAROXYSMAL ATRIAL FIBRILLATION) (HCC): ICD-10-CM

## 2018-01-16 LAB
INR PPP: 4.4 (ref 0.8–1.2)
PROTHROMBIN TIME: 42 SEC (ref 9.1–12)

## 2018-01-16 RX ORDER — WARFARIN 4 MG/1
TABLET ORAL
Qty: 45 TAB | Refills: 0
Start: 2018-01-16 | End: 2018-01-31 | Stop reason: SDUPTHER

## 2018-01-16 NOTE — PROGRESS NOTES
Inform patient INR is much too high. Confirm current dose of warfarin 4 mg tablets 1 tablet on Sundays, Tuesdays and Fridays and 0.5 tablets all other days . Change warfarin 4 mg tablets to none today and tomorrow, then 1 tablet on Tuesdays and 0.5 tablets the other 6 days. Repeat INR in  9 days.

## 2018-01-19 NOTE — PROGRESS NOTES
Verified two patient identifiers, name and . Pt notified of result, currently taking 4 mg coumadin 1 tablet on , , Saturday, Sundays, and 0.5 tablets all other days. She will make the change warfarin 4 mg tablets to none today and tomorrow, then 1 tablet on  and 0.5 tablets the other 6 days. Corrinne Dresser Next lab is . Pt had no further questions at this time.

## 2018-01-23 RX ORDER — CLONIDINE HYDROCHLORIDE 0.3 MG/1
TABLET ORAL
Qty: 60 TAB | Refills: 0 | Status: SHIPPED | OUTPATIENT
Start: 2018-01-23 | End: 2018-02-23 | Stop reason: SDUPTHER

## 2018-01-29 ENCOUNTER — APPOINTMENT (OUTPATIENT)
Dept: INTERNAL MEDICINE CLINIC | Age: 58
End: 2018-01-29

## 2018-01-29 DIAGNOSIS — I48.0 PAF (PAROXYSMAL ATRIAL FIBRILLATION) (HCC): ICD-10-CM

## 2018-01-30 LAB
INR PPP: 1.3 (ref 0.8–1.2)
PROTHROMBIN TIME: 13.3 SEC (ref 9.1–12)

## 2018-01-31 ENCOUNTER — TELEPHONE (OUTPATIENT)
Dept: INTERNAL MEDICINE CLINIC | Age: 58
End: 2018-01-31

## 2018-01-31 DIAGNOSIS — I48.0 PAF (PAROXYSMAL ATRIAL FIBRILLATION) (HCC): ICD-10-CM

## 2018-01-31 RX ORDER — WARFARIN 4 MG/1
TABLET ORAL
Qty: 45 TAB | Refills: 0
Start: 2018-01-31 | End: 2018-02-12 | Stop reason: SDUPTHER

## 2018-01-31 NOTE — PROGRESS NOTES
Inform patient INR is too low. Confirm current dose of warfarin 4 mg tablets 1 tablet on Tuesdays and 0.5 tablets the other 6 days . Change warfarin 4 mg tablets to 2 tablets today, then 1 tablet on Mon-Wed-Fri and  0.5 tablets the other 4 days. Repeat INR in  2 weeks.

## 2018-02-01 RX ORDER — POTASSIUM CHLORIDE 750 MG/1
TABLET, FILM COATED, EXTENDED RELEASE ORAL
Qty: 30 TAB | Refills: 0 | Status: SHIPPED | OUTPATIENT
Start: 2018-02-01 | End: 2018-03-06 | Stop reason: SDUPTHER

## 2018-02-07 ENCOUNTER — OFFICE VISIT (OUTPATIENT)
Dept: CARDIOLOGY CLINIC | Age: 58
End: 2018-02-07

## 2018-02-07 VITALS
HEART RATE: 76 BPM | OXYGEN SATURATION: 96 % | BODY MASS INDEX: 39.69 KG/M2 | HEIGHT: 69 IN | SYSTOLIC BLOOD PRESSURE: 144 MMHG | WEIGHT: 268 LBS | DIASTOLIC BLOOD PRESSURE: 80 MMHG | RESPIRATION RATE: 18 BRPM

## 2018-02-07 DIAGNOSIS — Z98.890 STATUS POST ABLATION OF ATRIAL FIBRILLATION: ICD-10-CM

## 2018-02-07 DIAGNOSIS — I48.0 PAF (PAROXYSMAL ATRIAL FIBRILLATION) (HCC): Primary | ICD-10-CM

## 2018-02-07 DIAGNOSIS — N18.4 CKD (CHRONIC KIDNEY DISEASE), STAGE IV (HCC): ICD-10-CM

## 2018-02-07 DIAGNOSIS — I10 ESSENTIAL HYPERTENSION: ICD-10-CM

## 2018-02-07 DIAGNOSIS — Z86.79 STATUS POST ABLATION OF ATRIAL FIBRILLATION: ICD-10-CM

## 2018-02-07 DIAGNOSIS — Z95.0 S/P CARDIAC PACEMAKER PROCEDURE: ICD-10-CM

## 2018-02-07 DIAGNOSIS — E78.2 MIXED HYPERLIPIDEMIA: ICD-10-CM

## 2018-02-07 PROBLEM — I49.9 IRREGULAR HEARTBEAT: Status: ACTIVE | Noted: 2018-02-07

## 2018-02-07 NOTE — PROGRESS NOTES
1. Have you been to the ER, urgent care clinic since your last visit? Hospitalized since your last visit? No    2. Have you seen or consulted any other health care providers outside of the [address] [place] [address] since your last visit? Include any pap smears or colon screening. No       Chief Complaint   Patient presents with    Irregular Heart Beat     6 mo appt. Denied cardiac symptoms.

## 2018-02-07 NOTE — MR AVS SNAPSHOT
[facility] [address] 
[phone] Patient: Paddy Phillips MRN: [id number] DAM:[id number] Visit Information Date & Time Provider Department Dept. Phone Encounter #  
 2/7/2018  2:45 PM George Win, [address] [facility] Cardiology Associate [place] [id number] [phone] Your Appointments 2/15/2018  3:45 PM  
LAB with LAB [facility]) Appt Note: inr Dr Stephane Espinosa [address] [address] [address] [phone]  
  
   
 8 [facility] [address] 3/15/2018  3:45 PM  
LAB with LAB [facility]) Appt Note: bmp, a1c, inr  
 [address] [address] [phone]  
  
    
 3/23/2018  3:30 PM  
ROUTINE CARE with MD Pito [facility]) Appt Note: 3mth f/u;  DM, HTN, CKD  non-fasting lab work a week before. Also non-fasting lab on 8/31; Phytel cancellation; DM, HTN, CKD  
 [address] [address] [phone]  
  
   
 [address] [facility] [facility] [address] Upcoming Health Maintenance Date Due  
 PAP AKA CERVICAL CYTOLOGY 11/17/2017 EYE EXAM RETINAL OR DILATED Q1 1/30/2018 HEMOGLOBIN A1C Q6M 2/16/2018 Pneumococcal 19-64 Highest Risk (2 of 3 - PCV13) 11/11/2025* FOOT EXAM Q1 5/22/2018 MICROALBUMIN Q1 8/16/2018 LIPID PANEL Q1 8/16/2018 COLONOSCOPY 4/21/2020 DTaP/Tdap/Td series (2 - Td) 6/13/2026 *Topic was postponed. The date shown is not the original due date. Allergies as of 2/7/2018  Review Complete On: 2/7/2018 By: Claudy Mora LPN Severity Noted Reaction Type Reactions Ace Inhibitors  03/14/2012    Cough Amlodipine  01/02/2017    Swelling Avapro [Irbesartan]  03/14/2012    Unable to Obtain Bactrim [Sulfamethoxazole-trimethoprim]  04/13/2010    Other (comments) Sore mouth Captopril  04/13/2010    Cough Carvedilol  07/19/2016   Side Effect Diarrhea Contrast Agent [Iodine]  04/13/2010    Itching Fish Containing Products  10/29/2013    Hives Morphine  04/13/2010    Nausea and Vomiting, Swelling Penicillins  04/13/2010    Hives Pravachol [Pravastatin]  04/13/2010    Nausea Only Seafood [Shellfish Containing Products]  03/14/2012    Hives Singulair [Montelukast]  04/13/2010    Other (comments)  
 palpitations Current Immunizations  Reviewed on 8/22/2017 Name Date Influenza Vaccine 10/6/2017, 10/1/2016, 10/1/2015, 10/5/2014 Influenza Vaccine PF 11/1/2013  8:55 AM  
 Influenza Vaccine Split 11/7/2011  1:58 PM, 11/30/2010 Tdap 6/13/2016 ZZZ-RETIRED (DO NOT USE) Pneumococcal Vaccine (Unspecified Type) 10/10/2009 Not reviewed this visit You Were Diagnosed With   
  
 Codes Comments Irregular heartbeat    -  Primary ICD-10-CM: I49.9 ICD-9-CM: 427.9 PAF (paroxysmal atrial fibrillation) (HCC)     ICD-10-CM: I48.0 ICD-9-CM: 427.31 Essential hypertension     ICD-10-CM: I10 
ICD-9-CM: 401.9 Mixed hyperlipidemia     ICD-10-CM: E78.2 ICD-9-CM: 272.2 Status post ablation of atrial fibrillation     ICD-10-CM: Z98.890, Z86.79 
ICD-9-CM: V45.89 Uncontrolled type 2 diabetes mellitus with stage 4 chronic kidney disease, with long-term current use of insulin (HCC)     ICD-10-CM: E11.22, E11.65, N18.4, Z79.4 ICD-9-CM: 250.52, 585.4, V58.67 CKD (chronic kidney disease), stage IV ([facility]     ICD-10-CM: N18.4 ICD-9-CM: 585.4 S/P cardiac pacemaker procedure     ICD-10-CM: Z95.0 ICD-9-CM: V45.01 Vitals BP Pulse Resp Height(growth percentile) Weight(growth percentile) SpO2  
 144/80 (BP 1 Location: Left arm, BP Patient Position: Sitting) 76 18 5' 9\" (1.753 m) 268 lb (121.6 kg) 96% BMI OB Status Smoking Status 39.58 kg/m2 Postmenopausal Never Smoker Vitals History BMI and BSA Data Body Mass Index Body Surface Area  
 39.58 kg/m 2 2.43 m 2 Preferred Pharmacy Pharmacy Name Phone [facility] [address], [address] [phone] Your Updated Medication List  
  
   
This list is accurate as of: 2/7/18  3:23 PM.  Always use your most recent med list.  
  
  
  
  
 bumetanide 1 mg tablet Commonly known as:  Christia Ovens TAKE ONE TABLET BY MOUTH TWICE A DAY (GENERIC FOR Christia Ovens) busPIRone 10 mg tablet Commonly known as:  BUSPAR Take one po twice daily  
  
 calcium carbonate 200 mg calcium (500 mg) Chew Commonly known as:  TUMS Take 1 Tab by mouth three (3) times daily (after meals). cholecalciferol (VITAMIN D3) 5,000 unit Tab tablet Commonly known as:  VITAMIN D3 Take 5,000 Units by mouth daily. cloNIDine HCl 0.3 mg tablet Commonly known as:  CATAPRES  
TAKE ONE TABLET BY MOUTH TWICE A DAY  
  
 cyanocobalamin 1,000 mcg sublingual tablet Commonly known as:  VITAMIN B-12 Take 1,000 mcg by mouth daily. doxazosin 2 mg tablet Commonly known as:  CARDURA TAKE ONE TABLET BY MOUTH ONCE NIGHTLY FLINTSTONES COMPLETE (IRON) chewable tablet Generic drug:  flintstones complete Take 1 Tab by mouth daily. hydrALAZINE 100 mg tablet Commonly known as:  APRESOLINE  
TAKE ONE TABLET BY MOUTH THREE TIMES A DAY  
  
 insulin lispro 100 unit/mL kwikpen Commonly known as:  HUMALOG  
2 units with dinner for sugars over 200 Insulin Needles (Disposable) 30 gauge x 1/3\" Commonly known as:  NOVOFINE 30  
Use 1 needle daily for injections. Insulin Syringe-Needle U-100 1/2 mL 30 gauge x 5/16 Syrg USE ONE SYRINGE AS NEEDED DAILY  
  
 LANTUS 100 unit/mL injection Generic drug:  insulin glargine INJECT 15 UNITS UNDER THE SKIN ONCE DAILY  
  
 metOLazone 5 mg tablet Commonly known as:  Bayron Bhatia  
 Take 1 Tab by mouth daily as needed (take if develop increased LE edema, weight gain > 5 pounds. ). metoprolol succinate 100 mg tablet Commonly known as:  TOPROL-XL  
TAKE TWO TABLETS BY MOUTH DAILY potassium chloride SR 10 mEq tablet Commonly known as:  KLOR-CON 10  
TAKE ONE TABLET BY MOUTH DAILY  
  
 sertraline 50 mg tablet Commonly known as:  ZOLOFT  
TAKE ONE AND ONE-HALF (1 & 1/2) TABLET BY MOUTH DAILY  
  
 TYLENOL PM PO Take 2 Tabs by mouth nightly as needed. vitamin A 8,000 unit capsule Take 8,000 Units by mouth daily. warfarin 4 mg tablet Commonly known as:  COUMADIN Take 2 tablets on 1/31, then 1 tablet on Mon-Wed-Fri and  0.5 tablets the other 4 days. Indications: PREVENT THROMBOEMBOLISM IN CHRONIC ATRIAL FIBRILLATION We Performed the Following AMB POC EKG ROUTINE W/ 12 LEADS, INTER & REP [60874 CPT(R)] Introducing [facility] SERVICES! Dear Shaylee Coto: 
Thank you for requesting a [employer] account. Our records indicate that you already have an active [employer] account. You can access your account anytime at [email or web address] [email or web address] Did you know that you can access your hospital and ER discharge instructions at any time in [employer]? You can also review all of your test results from your hospital stay or ER visit. Additional Information If you have questions, please visit the Frequently Asked Questions section of the [employer] website at [email or web address] Remember, [employer] is NOT to be used for urgent needs. For medical emergencies, dial 911. Now available from your iPhone and Android! Please provide this summary of care documentation to your next provider. Your primary care clinician is listed as Jarvis May. If you have any questions after today's visit, please call [phone].

## 2018-02-08 RX ORDER — BUMETANIDE 1 MG/1
TABLET ORAL
Qty: 60 TAB | Refills: 5 | Status: SHIPPED | OUTPATIENT
Start: 2018-02-08 | End: 2018-09-04 | Stop reason: SDUPTHER

## 2018-02-08 NOTE — PROGRESS NOTES
[facility]        [phone]                             NEW PATIENT HPI/FOLLOW-UP    NAME:  Skylar Deshpande   :   1960   MRN:   [id number]   PCP:  Carey Brandt MD           Subjective: The patient is a 61y.o. year old female  who returns for a routine follow-up. Since the last visit, patient reports no new symptoms. Denies change in exercise tolerance, chest pain, edema, medication intolerance, palpitations, shortness of breath, PND/orthopnea wheezing, sputum, syncope, dizziness or light headedness. Doing satisfactorily. Review of Systems  General: Pt denies excessive weight gain or loss. Pt is able to conduct ADL's. Respiratory: Denies shortness of breath, CHOW, wheezing or stridor.   Cardiovascular: Denies precordial pain, palpitations, edema or PND  Gastrointestinal: Denies poor appetite, indigestion, abdominal pain or blood in stool  Peripheral vascular: Denies claudication, leg cramps  Neurological: Denies paresthesias, tingling.numbness  Psychiatric: Denies anxiety,depression,fatigue  Musculoskeletal: Denies pain,tenderness, soreness,swelling      Past Medical History:   Diagnosis Date    Acquired lymphedema     Right arm    Arrhythmia     Asthma     Atrial fibrillation (HCC)     Dr. Gabe Max and Dr. Arjun Mcnulty Atrial flutter ([facility]     Cancer [facility])     bilateral breast    Chronic kidney disease     Diabetes mellitus type II     Dizziness     Essential hypertension     Hyperlipidemia     Hypertension     Long term (current) use of anticoagulants     Morbid obesity ([facility] 3/14/2012    Osteoarthritis     Pacemaker     Sick sinus syndrome [facility])      Patient Active Problem List    Diagnosis Date Noted    Irregular heartbeat 2018    Obesity, Class II, BMI 35-39.9, with comorbidity 2017    Uncontrolled type 2 diabetes mellitus with stage 4 chronic kidney disease, with long-term current use of insulin ([facility] 01/16/2017    PAF (paroxysmal atrial fibrillation) ([facility]) 11/28/2016    Anemia 11/28/2016    Anemia in chronic kidney disease 11/28/2016    Essential hypertension 88/73/3306    Systolic murmur 95/07/3098    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 ([facility]) 78/64/7285    CKD (chronic kidney disease), stage IV ([facility]) 06/09/2012    Proliferative diabetic retinopathy ([facility] 06/04/2012    Mixed hyperlipidemia 05/22/2012    Long term (current) use of anticoagulants 04/11/2012    S/P cardiac pacemaker procedure 04/03/2012    Sick sinus syndrome ([facility] 04/02/2012    Status post ablation of atrial fibrillation 12/15/2011    Fe deficiency anemia 04/14/2010    Breast ca: f/b Dr Mati Tapia 04/13/2010    Asthma 04/13/2010      Past Surgical History:   Procedure Laterality Date    ABDOMEN SURGERY PROC UNLISTED      gastric bypass    GASTRIC BYPASS,OBESE<150CM SAW-EN-Y  7/08    hutcher    HX AFIB ABLATION      HX CARPAL TUNNEL RELEASE      HX CERVICAL FUSION  1994    HX DILATION AND CURETTAGE  1992    HX MASTECTOMY  1998    RIGHT MODIFIED RADICAL     HX MOHS PROCEDURES  1995    right    HX ORTHOPAEDIC      ight knee surgery    HX PACEMAKER      IR INSERT TUNL CVC W PORT OVER 5 YEARS      [address]    NEEDLE BIOPSY LIVER,W OTHR PROC  8.21.07    [place] ANESTH,KNEE AREA SURGERY  1982 AND 1994    [place] TRABECULOPLASTY BY LASER SURGERY      US GUIDE ASP OVARIAN CYST ABD APPROACH  8.21.07    RIGHT      Allergies   Allergen Reactions    Ace Inhibitors Cough    Amlodipine Swelling    Avapro [Irbesartan] Unable to Obtain    Bactrim [Sulfamethoxazole-Trimethoprim] Other (comments)     Sore mouth    Captopril Cough    Carvedilol Diarrhea    Contrast Agent [Iodine] Itching    Fish Containing Products Hives    Morphine Nausea and Vomiting and Swelling    Penicillins Hives    Pravachol [Pravastatin] Nausea Only    Seafood [Shellfish Containing Products] Hives    Singulair [Montelukast] Other (comments)     palpitations      Family History   Problem Relation Age of Onset    Cancer Mother     Heart Disease Mother     Alcohol abuse Father     Diabetes Brother     Hypertension Brother       Social History     Social History    Marital status:      Spouse name: N/A    Number of children: N/A    Years of education: N/A     Occupational History    Not on file. Social History Main Topics    Smoking status: Never Smoker    Smokeless tobacco: Never Used    Alcohol use Yes      Comment: rare    Drug use: No    Sexual activity: Not on file     Other Topics Concern    Not on file     Social History Narrative      Current Outpatient Prescriptions   Medication Sig    busPIRone (BUSPAR) 10 mg tablet Take one po twice daily    sertraline (ZOLOFT) 50 mg tablet TAKE ONE AND ONE-HALF (1 & 1/2) TABLET BY MOUTH DAILY    potassium chloride SR (KLOR-CON 10) 10 mEq tablet TAKE ONE TABLET BY MOUTH DAILY    warfarin (COUMADIN) 4 mg tablet Take 2 tablets on 1/31, then 1 tablet on Mon-Wed-Fri and  0.5 tablets the other 4 days. Indications: PREVENT THROMBOEMBOLISM IN CHRONIC ATRIAL FIBRILLATION    cloNIDine HCl (CATAPRES) 0.3 mg tablet TAKE ONE TABLET BY MOUTH TWICE A DAY    bumetanide (BUMEX) 1 mg tablet TAKE ONE TABLET BY MOUTH TWICE A DAY (GENERIC FOR BUMEX)    hydrALAZINE (APRESOLINE) 100 mg tablet TAKE ONE TABLET BY MOUTH THREE TIMES A DAY    doxazosin (CARDURA) 2 mg tablet TAKE ONE TABLET BY MOUTH ONCE NIGHTLY    LANTUS 100 unit/mL injection INJECT 15 UNITS UNDER THE SKIN ONCE DAILY    metoprolol succinate (TOPROL-XL) 100 mg tablet TAKE TWO TABLETS BY MOUTH DAILY    Insulin Syringe-Needle U-100 1/2 mL 30 gauge x 5/16 syrg USE ONE SYRINGE AS NEEDED DAILY    metolazone (ZAROXOLYN) 5 mg tablet Take 1 Tab by mouth daily as needed (take if develop increased LE edema, weight gain > 5 pounds. ).     cholecalciferol, VITAMIN D3, (VITAMIN D3) 5,000 unit tab tablet Take 5,000 Units by mouth daily.  vitamin A 8,000 unit capsule Take 8,000 Units by mouth daily.  ACETAMINOPHEN/DIPHENHYDRAMINE (TYLENOL PM PO) Take 2 Tabs by mouth nightly as needed.  insulin lispro (HUMALOG) 100 unit/mL kwikpen 2 units with dinner for sugars over 200    Insulin Needles, Disposable, (NOVOFINE 30) 30 x 1/3 \" Use 1 needle daily for injections.  cyanocobalamin (VITAMIN B-12) 1,000 mcg sublingual tablet Take 1,000 mcg by mouth daily.  calcium carbonate (TUMS) 200 mg calcium (500 mg) Chew Take 1 Tab by mouth three (3) times daily (after meals).  flintstones complete (FLINTSTONES COMPLETE) chewable tablet Take 1 Tab by mouth daily. No current facility-administered medications for this visit. I have reviewed the nurses notes, vitals, problem list, allergy list, medical history, family medical, social history and medications. Objective:     Physical Exam:     Vitals:    02/07/18 1449   BP: 144/80   Pulse: 76   Resp: 18   SpO2: 96%   Weight: 268 lb (121.6 kg)   Height: 5' 9\" (1.753 m)    Body mass index is 39.58 kg/(m^2). General: Well developed, in no acute distress. HEENT: No carotid bruits, no JVD, trach is midline. Heart:  Normal S1/S2 negative S3 or S4. Regular, no murmur, gallop or rub.   Respiratory: Clear bilaterally, no wheezing or rales  Abdomen:   Soft, non-tender, bowel sounds are active.   Extremities:  No edema, normal cap refill, no cyanosis. Neuro: A&Ox3, speech clear, gait stable. Skin: Skin color is normal. No rashes or lesions. No diaphoresis.   Vascular: 2+ pulses symmetric in all extremities        Data Review:       Cardiographics:    EKG: Probable ectopic atrial rhythm,clockwise rotation, non-specific ST-T wave changes    Cardiology Labs:    Results for orders placed or performed during the hospital encounter of 04/09/14   EKG, 12 LEAD, INITIAL   Result Value Ref Range    Ventricular Rate 82 BPM    Atrial Rate 82 BPM    P-R Interval 168 ms    QRS Duration 120 ms    Q-T Interval 454 ms    QTC Calculation (Bezet) 530 ms    Calculated P Axis 58 degrees    Calculated R Axis -25 degrees    Calculated T Axis 94 degrees    Diagnosis       Normal sinus rhythm  Left ventricular hypertrophy with QRS widening  Cannot rule out Septal infarct , age undetermined  When compared with ECG of 07-NOV-2013 12:02,  Sinus rhythm has replaced Electronic atrial pacemaker  Minimal criteria for Septal infarct are now present  Confirmed by Salazar Mayo MD, Teresa Martinez ([id number]) on 4/9/2014 12:03:30 PM       Lab Results   Component Value Date/Time    Cholesterol, total 158 08/16/2017 08:16 AM    HDL Cholesterol 29 (L) 08/16/2017 08:16 AM    LDL, calculated 85 08/16/2017 08:16 AM    Triglyceride 221 (H) 08/16/2017 08:16 AM       Lab Results   Component Value Date/Time    Sodium 141 08/16/2017 08:16 AM    Potassium 4.5 08/16/2017 08:16 AM    Chloride 102 08/16/2017 08:16 AM    CO2 18 08/16/2017 08:16 AM    Anion gap 10 05/21/2016 04:38 PM    Glucose 87 08/16/2017 08:16 AM    BUN 25 (H) 08/16/2017 08:16 AM    Creatinine 2.06 (H) 08/16/2017 08:16 AM    BUN/Creatinine ratio 12 08/16/2017 08:16 AM    GFR est AA 30 (L) 08/16/2017 08:16 AM    GFR est non-AA 26 (L) 08/16/2017 08:16 AM    Calcium 9.0 08/16/2017 08:16 AM    Bilirubin, total <0.2 05/15/2017 03:43 PM    AST (SGOT) 16 05/15/2017 03:43 PM    Alk. phosphatase 130 (H) 05/15/2017 03:43 PM    Protein, total 6.9 05/15/2017 03:43 PM    Albumin 4.0 05/15/2017 03:43 PM    Globulin 4.1 (H) 05/21/2016 04:38 PM    A-G Ratio 1.4 05/15/2017 03:43 PM    ALT (SGPT) 13 05/15/2017 03:43 PM          Assessment:       ICD-10-CM ICD-9-CM    1. PAF (paroxysmal atrial fibrillation) (HCC) I48.0 427.31 AMB POC EKG ROUTINE W/ 12 LEADS, INTER & REP   2. Essential hypertension I10 401.9    3. Mixed hyperlipidemia E78.2 272.2    4. Status post ablation of atrial fibrillation Z98.890 V45.89 AMB POC EKG ROUTINE W/ 12 LEADS, INTER & REP    Z86.79     5.  CKD (chronic kidney disease), stage IV [facility] N18.4 585.4    6. S/P cardiac pacemaker procedure Z95.0 V45.01 AMB POC EKG ROUTINE W/ 12 LEADS, INTER & REP   7. Obesity, Class II, BMI 35-39.9, with comorbidity E66.9 278.00          Discussion: Patient presents at this time stable from a cardiac perspective. No recurrent palpitations. Pleased with present status. Plan: 1. Continue same meds. Lipid profile and labs followed by PCP. 2.Encouraged to exercise to tolerance, lose weight and follow low fat, low cholesterol, low sodium predominantly Plant-based (consider Mediterranean) diet. Call with questions or concerns. Will follow up any test results by phone and/or f/u here in office if needed. Debbie Lantigua 3.Follow up: 1 year    I have discussed the diagnosis with the patient and the intended plan as seen in the above orders. The patient has received an after-visit summary and questions were answered concerning future plans. I have discussed any concerning medication side effects and warnings with the patient as well.     Jinny Abbasi MD  2/7/2018

## 2018-02-14 RX ORDER — SYRINGE-NEEDLE,INSULIN,0.5 ML 30 GX5/16"
SYRINGE, EMPTY DISPOSABLE MISCELLANEOUS
Qty: 100 SYRINGE | Refills: 1 | Status: SHIPPED | OUTPATIENT
Start: 2018-02-14 | End: 2018-04-19

## 2018-02-15 ENCOUNTER — APPOINTMENT (OUTPATIENT)
Dept: INTERNAL MEDICINE CLINIC | Age: 58
End: 2018-02-15

## 2018-02-15 DIAGNOSIS — I48.0 PAF (PAROXYSMAL ATRIAL FIBRILLATION) (HCC): ICD-10-CM

## 2018-02-16 ENCOUNTER — TELEPHONE (OUTPATIENT)
Dept: INTERNAL MEDICINE CLINIC | Age: 58
End: 2018-02-16

## 2018-02-16 DIAGNOSIS — I48.0 PAF (PAROXYSMAL ATRIAL FIBRILLATION) (HCC): Primary | ICD-10-CM

## 2018-02-16 LAB
INR PPP: 2.4 (ref 0.8–1.2)
PROTHROMBIN TIME: 24.3 SEC (ref 9.1–12)

## 2018-02-16 NOTE — PROGRESS NOTES
Inform patient INR is therapeutic. Confirm current dose of warfarin 4 mg tablets 1 tablet on Mon-Wed-Fri and  0.5 tablets the other 4 days. . No change in warfarin dose. Repeat in 2 weeks to assure stability.

## 2018-02-26 RX ORDER — CLONIDINE HYDROCHLORIDE 0.3 MG/1
TABLET ORAL
Qty: 60 TAB | Refills: 11 | Status: SHIPPED | OUTPATIENT
Start: 2018-02-26 | End: 2019-03-04 | Stop reason: SDUPTHER

## 2018-02-26 NOTE — TELEPHONE ENCOUNTER
Pt called back and is still waiting on medication refills from Dr URIOSTEGUI. Please advise patient of status.        Thanks  Angela Whitlock

## 2018-02-27 RX ORDER — DOXAZOSIN 2 MG/1
TABLET ORAL
Qty: 30 TAB | Refills: 11 | Status: SHIPPED | OUTPATIENT
Start: 2018-02-27 | End: 2018-06-11 | Stop reason: SDUPTHER

## 2018-02-27 NOTE — TELEPHONE ENCOUNTER
PCP: Nevin Pacheco MD    Last appt: 2/15/2018  Future Appointments  Date Time Provider Gómez Pinzoni   3/5/2018 3:45 PM LAB [facility] SCHED   4/12/2018 3:45 PM LAB [facility] SCHED   4/19/2018 3:00 PM Nevin Pacheco  [facility]       Requested Prescriptions     Pending Prescriptions Disp Refills    doxazosin (CARDURA) 2 mg tablet 30 Tab 11     Sig: TAKE ONE TABLET BY MOUTH ONCE NIGHTLY

## 2018-02-27 NOTE — TELEPHONE ENCOUNTER
From: Sara Hui  To: Adam Pineda MD  Sent: 2/27/2018 3:45 PM EST  Subject: Medication Renewal Request    Original authorizing provider: MD Asif White.  Devoria Files would like a refill of the following medications:  doxazosin (CARDURA) 2 mg tablet Adam Pineda MD]    Preferred pharmacy: [facility] [address] [facility], [address]    Comment:

## 2018-03-02 RX ORDER — CLONIDINE HYDROCHLORIDE 0.3 MG/1
0.3 TABLET ORAL 2 TIMES DAILY
Qty: 60 TAB | Refills: 10 | Status: SHIPPED | OUTPATIENT
Start: 2018-03-02 | End: 2018-04-19 | Stop reason: SDUPTHER

## 2018-03-02 NOTE — TELEPHONE ENCOUNTER
From: Skylar Deshpande  To: Khari Escobar MD  Sent: 2/26/2018 4:04 PM EST  Subject: Medication Renewal Request    Original authorizing provider: Khari Escobar MD    Randi Other Emile Senters would like a refill of the following medications:  cloNIDine HCl (CATAPRES) 0.3 mg tablet Khari Escobar MD]    Preferred pharmacy: [facility] [address] [place] [address]    Comment:  I need my clonidine refilled please I have called Dr. Arleen Kulkarni don't know if he is out of the office or what it still has not been filled and I ordered it on Thursday last week. I spoke to the office this morning.

## 2018-03-05 ENCOUNTER — APPOINTMENT (OUTPATIENT)
Dept: INTERNAL MEDICINE CLINIC | Age: 58
End: 2018-03-05

## 2018-03-05 DIAGNOSIS — I48.0 PAF (PAROXYSMAL ATRIAL FIBRILLATION) (HCC): ICD-10-CM

## 2018-03-06 LAB
INR PPP: 2.6 (ref 0.8–1.2)
PROTHROMBIN TIME: 25.6 SEC (ref 9.1–12)

## 2018-03-06 NOTE — PROGRESS NOTES
Inform patient INR is therapeutic. Confirm current dose of warfarin 4 mg tablets 1 tablet on Mon-Wed-Fri and  0.5 tablets the other 4 days. . No change in warfarin dose.  Repeat in 4 weeks

## 2018-03-07 RX ORDER — POTASSIUM CHLORIDE 750 MG/1
TABLET, FILM COATED, EXTENDED RELEASE ORAL
Qty: 30 TAB | Refills: 11 | Status: SHIPPED | OUTPATIENT
Start: 2018-03-07 | End: 2019-04-22 | Stop reason: SDUPTHER

## 2018-03-15 RX ORDER — HYDRALAZINE HYDROCHLORIDE 100 MG/1
TABLET, FILM COATED ORAL
Qty: 90 TAB | Refills: 1 | Status: SHIPPED | OUTPATIENT
Start: 2018-03-15 | End: 2018-06-06 | Stop reason: SDUPTHER

## 2018-03-30 DIAGNOSIS — F43.0 ANXIETY AS ACUTE REACTION TO GROSS STRESS: ICD-10-CM

## 2018-03-30 DIAGNOSIS — F41.1 ANXIETY AS ACUTE REACTION TO GROSS STRESS: ICD-10-CM

## 2018-03-30 RX ORDER — SERTRALINE HYDROCHLORIDE 50 MG/1
50 TABLET, FILM COATED ORAL DAILY
Qty: 45 TAB | Refills: 1 | Status: SHIPPED | OUTPATIENT
Start: 2018-03-30 | End: 2018-05-29 | Stop reason: SDUPTHER

## 2018-04-02 DIAGNOSIS — F41.1 ANXIETY AS ACUTE REACTION TO GROSS STRESS: ICD-10-CM

## 2018-04-02 DIAGNOSIS — I48.0 PAF (PAROXYSMAL ATRIAL FIBRILLATION) (HCC): ICD-10-CM

## 2018-04-02 DIAGNOSIS — F43.0 ANXIETY AS ACUTE REACTION TO GROSS STRESS: ICD-10-CM

## 2018-04-12 ENCOUNTER — LAB ONLY (OUTPATIENT)
Dept: INTERNAL MEDICINE CLINIC | Facility: CLINIC | Age: 58
End: 2018-04-12

## 2018-04-12 DIAGNOSIS — Z79.01 LONG TERM (CURRENT) USE OF ANTICOAGULANTS: ICD-10-CM

## 2018-04-13 LAB
BUN SERPL-MCNC: 26 MG/DL (ref 6–24)
BUN/CREAT SERPL: 16 (ref 9–23)
CALCIUM SERPL-MCNC: 8.6 MG/DL (ref 8.7–10.2)
CHLORIDE SERPL-SCNC: 99 MMOL/L (ref 96–106)
CO2 SERPL-SCNC: 19 MMOL/L (ref 18–29)
CREAT SERPL-MCNC: 1.67 MG/DL (ref 0.57–1)
EST. AVERAGE GLUCOSE BLD GHB EST-MCNC: 174 MG/DL
GFR SERPLBLD CREATININE-BSD FMLA CKD-EPI: 33 ML/MIN/1.73
GFR SERPLBLD CREATININE-BSD FMLA CKD-EPI: 39 ML/MIN/1.73
GLUCOSE SERPL-MCNC: 141 MG/DL (ref 65–99)
HBA1C MFR BLD: 7.7 % (ref 4.8–5.6)
INR PPP: 1.6 (ref 0.8–1.2)
POTASSIUM SERPL-SCNC: 4.7 MMOL/L (ref 3.5–5.2)
PROTHROMBIN TIME: 16.2 SEC (ref 9.1–12)
SODIUM SERPL-SCNC: 136 MMOL/L (ref 134–144)

## 2018-04-13 RX ORDER — WARFARIN 4 MG/1
TABLET ORAL
Qty: 45 TAB | Refills: 0
Start: 2018-04-13 | End: 2018-04-27 | Stop reason: SDUPTHER

## 2018-04-13 RX ORDER — SERTRALINE HYDROCHLORIDE 50 MG/1
50 TABLET, FILM COATED ORAL DAILY
Qty: 45 TAB | Refills: 1 | OUTPATIENT
Start: 2018-04-13

## 2018-04-13 NOTE — PROGRESS NOTES
Inform patient INR is too low. Confirm current dose of warfarin 4 mg tablets 1 tablet on Mon-Wed-Fri and  0.5 tablets the other 4 days. . Change warfarin 4 mg tablets to 1.5 tablets today only, then 0.5 tablet on Mon and Fri and 1 tablet the other 5 days. .  Repeat INR in  2 weeks. Remind her to be consistent about intake of high Vit K containing foods.  Will discuss other results at [facility] 4/19  Message also sent in My Chart

## 2018-04-19 ENCOUNTER — OFFICE VISIT (OUTPATIENT)
Dept: INTERNAL MEDICINE CLINIC | Facility: CLINIC | Age: 58
End: 2018-04-19

## 2018-04-19 VITALS
HEIGHT: 69 IN | DIASTOLIC BLOOD PRESSURE: 80 MMHG | OXYGEN SATURATION: 96 % | BODY MASS INDEX: 39.99 KG/M2 | SYSTOLIC BLOOD PRESSURE: 184 MMHG | HEART RATE: 63 BPM | TEMPERATURE: 98.1 F | RESPIRATION RATE: 20 BRPM | WEIGHT: 270 LBS

## 2018-04-19 DIAGNOSIS — I48.0 PAF (PAROXYSMAL ATRIAL FIBRILLATION) (HCC): ICD-10-CM

## 2018-04-19 DIAGNOSIS — I49.5 SICK SINUS SYNDROME (HCC): ICD-10-CM

## 2018-04-19 DIAGNOSIS — E78.2 MIXED HYPERLIPIDEMIA: ICD-10-CM

## 2018-04-19 DIAGNOSIS — I10 ESSENTIAL HYPERTENSION: ICD-10-CM

## 2018-04-19 DIAGNOSIS — E11.3522: ICD-10-CM

## 2018-04-19 DIAGNOSIS — Z79.01 LONG TERM (CURRENT) USE OF ANTICOAGULANTS: ICD-10-CM

## 2018-04-19 DIAGNOSIS — Z13.31 SCREENING FOR DEPRESSION: ICD-10-CM

## 2018-04-19 RX ORDER — SYRING-NEEDL,DISP,INSUL,0.3 ML 30 GX5/16"
SYRINGE, EMPTY DISPOSABLE MISCELLANEOUS
COMMUNITY
Start: 2018-02-14 | End: 2019-03-04 | Stop reason: SDUPTHER

## 2018-04-19 NOTE — PROGRESS NOTES
Chanda Hensley  Identified pt with two pt identifiers(name and ). Chief Complaint   Patient presents with    Diabetes    Blood Pressure Check    Chronic Kidney Disease       Reviewed record In preparation for visit and have obtained necessary documentation. Has info on advanced directive but has not filled them out. 1. Have you been to the ER, urgent care clinic or hospitalized since your last visit? No     2. Have you seen or consulted any other health care providers outside of the [address] [place] [address] since your last visit? Include any pap smears or colon screening. Dr Denver Habermann reviewed with provider.     Health Maintenance reviewed:     Health Maintenance Due   Topic    PAP AKA CERVICAL CYTOLOGY     EYE EXAM RETINAL OR DILATED Q1    eeli  Wt Readings from Last 3 Encounters:   18 270 lb (122.5 kg)   18 268 lb (121.6 kg)   17 268 lb (121.6 kg)   ng down, depressed or hopeless Not at all   Total Score PHQ 2 0                                                           BP Readings from Last 3 Encounters:   18 184/80   18 144/80   17 142/60    No Help Needed   Getting  Learning Assessment 3/25/2014   PRIMARY LEARNER Patient   HIGHEST LEVEL OF EDUCATION - PRIMARY LEARNER  2 YEARS OF COLLEGE   BARRIERS PRIMARY LEARNER NONE   CO-LEARNER CAREGIVER No   PRIMARY LANGUAGE ENGLISH    NEED No   LEARNER PREFERENCE PRIMARY DEMONSTRATION   LEARNING SPECIAL TOPICS Does does a pacemaker any noise   ANSWERED BY patient   RELATIONSHIP SELF

## 2018-04-19 NOTE — PATIENT INSTRUCTIONS
Office visit in 4 months with non-fasting lab work a week before visit. Body Mass Index: Care Instructions  Your Care Instructions    Body mass index (BMI) can help you see if your weight is raising your risk for health problems. It uses a formula to compare how much you weigh with how tall you are. · A BMI lower than 18.5 is considered underweight. · A BMI between 18.5 and 24.9 is considered healthy. · A BMI between 25 and 29.9 is considered overweight. A BMI of 30 or higher is considered obese. If your BMI is in the normal range, it means that you have a lower risk for weight-related health problems. If your BMI is in the overweight or obese range, you may be at increased risk for weight-related health problems, such as high blood pressure, heart disease, stroke, arthritis or joint pain, and diabetes. If your BMI is in the underweight range, you may be at increased risk for health problems such as fatigue, lower protection (immunity) against illness, muscle loss, bone loss, hair loss, and hormone problems. BMI is just one measure of your risk for weight-related health problems. You may be at higher risk for health problems if you are not active, you eat an unhealthy diet, or you drink too much alcohol or use tobacco products. Follow-up care is a key part of your treatment and safety. Be sure to make and go to all appointments, and call your doctor if you are having problems. It's also a good idea to know your test results and keep a list of the medicines you take. How can you care for yourself at home? · Practice healthy eating habits. This includes eating plenty of fruits, vegetables, whole grains, lean protein, and low-fat dairy. · If your doctor recommends it, get more exercise. Walking is a good choice. Bit by bit, increase the amount you walk every day. Try for at least 30 minutes on most days of the week. · Do not smoke. Smoking can increase your risk for health problems.  If you need help quitting, talk to your doctor about stop-smoking programs and medicines. These can increase your chances of quitting for good. · Limit alcohol to 2 drinks a day for men and 1 drink a day for women. Too much alcohol can cause health problems. If you have a BMI higher than 25  · Your doctor may do other tests to check your risk for weight-related health problems. This may include measuring the distance around your waist. A waist measurement of more than 40 inches in men or 35 inches in women can increase the risk of weight-related health problems. · Talk with your doctor about steps you can take to stay healthy or improve your health. You may need to make lifestyle changes to lose weight and stay healthy, such as changing your diet and getting regular exercise. If you have a BMI lower than 18.5  · Your doctor may do other tests to check your risk for health problems. · Talk with your doctor about steps you can take to stay healthy or improve your health. You may need to make lifestyle changes to gain or maintain weight and stay healthy, such as getting more healthy foods in your diet and doing exercises to build muscle. Where can you learn more? Go to http://dona-windy.info/. Enter S176 in the search box to learn more about \"Body Mass Index: Care Instructions. \"  Current as of: October 13, 2016  Content Version: 11.4  © 1995-4740 [employer]. Care instructions adapted under license by [employer] (which disclaims liability or warranty for this information). If you have questions about a medical condition or this instruction, always ask your healthcare professional. [place] [address] any warranty or liability for your use of this information.

## 2018-04-19 NOTE — PROGRESS NOTES
HISTORY OF PRESENT ILLNESS  Emily Oakes is a 61 y.o. female. HPI She presents for follow up of diabetes mellitus with retinopathy, hypertension, chronic kidney disease and secondary anemia, hyperlipidemia, obesity, paroxysmal atrial fibrillation and SSS with pacemaker. Diabetic ROS - medication compliance: compliant all of the time, Taking OTC decongestant for allergies. home glucose monitoring: fasting <120,     home BP Monitoring: no.      further diabetic ROS: no polyuria or polydipsia, no unusual visual symptoms, no medication side effects noted, has dysesthesias in the feet, \"a couple of low blood sugars since last visit\" . Diet and Lifestyle: generally follows a low fat low cholesterol diet, not attempting to follow a low sodium diet, does not rigorously follow a diabetic diet, exercises sporadically, nonsmoker  Cardiovascular ROS:  She complains of lower extremity edema.    She denies palpitations, orthopnea, exertional chest pressure/discomfort, claudication, dyspnea on exertion, dizziness      PHQ over the last two weeks 4/19/2018   Little interest or pleasure in doing things Not at all   Feeling down, depressed or hopeless Not at all   Total Score PHQ 2 0     Patient Active Problem List   Diagnosis Code    Breast ca: f/b Dr Sun Hussein C50.919    Asthma J45.909    Fe deficiency anemia D50.9    Status post ablation of atrial fibrillation Z98.890, Z86.79    Sick sinus syndrome (HCC) I49.5    S/P cardiac pacemaker procedure Z95.0    Long term (current) use of anticoagulants Z79.01    Mixed hyperlipidemia E78.2    Proliferative diabetic retinopathy ([address] E11.3599    CKD (chronic kidney disease), stage IV ([facility]) N18.4    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 Shelley Gonzalez and Dr. Sherry Haas Atrial flutter ([facility]     Cancer [facility])     bilateral breast    Chronic kidney disease     Diabetes mellitus type II     Dizziness     Essential hypertension     Hyperlipidemia     Hypertension     Long term (current) use of anticoagulants     Morbid obesity ([facility] 3/14/2012    Osteoarthritis     Pacemaker     Sick sinus syndrome ([facility]      Past Surgical History:   Procedure Laterality Date    ABDOMEN SURGERY PROC UNLISTED      gastric bypass    GASTRIC BYPASS,OBESE<150CM SAW[facility]EN[facility]Y  7/08    [facility]    HX AFIB ABLATION      HX CARPAL TUNNEL RELEASE      HX CERVICAL FUSION  1994    HX DILATION AND CURETTAGE  1992    HX MASTECTOMY  1998    RIGHT MODIFIED RADICAL     HX MOHS PROCEDURES  1995    right    HX ORTHOPAEDIC      ight knee surgery    HX PACEMAKER      IR INSERT TUNL CVC W PORT OVER 5 YEARS      [address]    NEEDLE BIOPSY LIVER,W OTHR [address]  8.21.07    HI [address] AND 1994    HI TRABECULOPLASTY BY LASER SURGERY      US GUIDE ASP OVARIAN CYST ABD APPROACH  8.21.07    RIGHT      Social History     Social History    Marital status:      Spouse name: N/A    Number of children: N/A    Years of education: N/A     Social History Main Topics    Smoking status: Never Smoker    Smokeless tobacco: Never Used    Alcohol use Yes      Comment: rare    Drug use: No    Sexual activity: Not Asked     Other Topics Concern    None     Social History Narrative     Family History   Problem Relation Age of Onset    Cancer Mother     Heart Disease Mother     Alcohol abuse Father     Diabetes Brother     Hypertension Brother      Allergies   Allergen Reactions    Ace Inhibitors Cough    Amlodipine Swelling    Avapro [Irbesartan] Unable to Obtain    Bactrim [Sulfamethoxazole-Trimethoprim] Other (comments)     Sore mouth    Captopril Cough    Carvedilol Diarrhea    Contrast Agent [Iodine] Itching    Fish Containing Products Hives    Morphine Nausea and Vomiting and Swelling    Penicillins Hives    Pravachol [Pravastatin] Nausea Only    Seafood [Shellfish Containing Products] Hives    Singulair [Montelukast] Other (comments)     palpitations     Current Outpatient Prescriptions   Medication Sig Dispense Refill    TRUEPLUS INSULIN 0.5 mL 31 gauge x 5/16 syrg       warfarin (COUMADIN) 4 mg tablet Take 1.5 tablets 4/13 only, then 0.5 tablet on Mon and Fri and 1 tablet the other 5 days 45 Tab 0    sertraline (ZOLOFT) 50 mg tablet Take 1 Tab by mouth daily. 45 Tab 1    hydrALAZINE (APRESOLINE) 100 mg tablet TAKE ONE TABLET BY MOUTH THREE TIMES A DAY 90 Tab 1    potassium chloride SR (KLOR-CON 10) 10 mEq tablet TAKE ONE TABLET BY MOUTH DAILY 30 Tab 11    doxazosin (CARDURA) 2 mg tablet TAKE ONE TABLET BY MOUTH ONCE NIGHTLY 30 Tab 11    cloNIDine HCl (CATAPRES) 0.3 mg tablet TAKE ONE TABLET BY MOUTH TWICE A DAY 60 Tab 11    bumetanide (BUMEX) 1 mg tablet TAKE ONE TABLET BY MOUTH TWICE A DAY (GENERIC FOR BUMEX) 60 Tab 5    busPIRone (BUSPAR) 10 mg tablet Take one po twice daily 60 Tab 1    LANTUS 100 unit/mL injection INJECT 15 UNITS UNDER THE SKIN ONCE DAILY 10 mL 10    metoprolol succinate (TOPROL-XL) 100 mg tablet TAKE TWO TABLETS BY MOUTH DAILY 60 Tab 11    metolazone (ZAROXOLYN) 5 mg tablet Take 1 Tab by mouth daily as needed (take if develop increased LE edema, weight gain > 5 pounds. ). 30 Tab 1    cholecalciferol, VITAMIN D3, (VITAMIN D3) 5,000 unit tab tablet Take 5,000 Units by mouth daily.  vitamin A 8,000 unit capsule Take 8,000 Units by mouth daily.       ACETAMINOPHEN/DIPHENHYDRAMINE (TYLENOL PM PO) Take 2 Tabs by mouth nightly as needed.  insulin lispro (HUMALOG) 100 unit/mL kwikpen 2 units with dinner for sugars over 200 1 Package 0    cyanocobalamin (VITAMIN B-12) 1,000 mcg sublingual tablet Take 1,000 mcg by mouth daily.  calcium carbonate (TUMS) 200 mg calcium (500 mg) Chew Take 1 Tab by mouth three (3) times daily (after meals).  flintstones complete (FLINTSTONES COMPLETE) chewable tablet Take 1 Tab by mouth daily. Review of Systems   Constitutional: Negative for malaise/fatigue and weight loss. HENT: Positive for congestion (sneezing, left ear feels stopped up, cough). Gastrointestinal: Negative for constipation and diarrhea. Musculoskeletal: Positive for joint pain (knees). Negative for back pain. Neurological: Negative for tingling and focal weakness. Visit Vitals    /80 (BP 1 Location: Left arm, BP Patient Position: Sitting)    Pulse 63    Temp 98.1 °F (36.7 °C) (Oral)    Resp 20    Ht 5' 9\" (1.753 m)    Wt 270 lb (122.5 kg)    SpO2 96%    BMI 39.87 kg/m2     Physical Exam   Constitutional: She is oriented to person, place, and time. She appears well-developed and well-nourished. HENT:   Head: Normocephalic and atraumatic. Eyes: Conjunctivae are normal. Pupils are equal, round, and reactive to light. Neck: Neck supple. Carotid bruit is not present. No thyromegaly present. Cardiovascular: Normal rate, regular rhythm and normal heart sounds. PMI is not displaced. Exam reveals no gallop. No murmur heard. Pulses:       Dorsalis pedis pulses are 1+ on the right side, and 1+ on the left side. Posterior tibial pulses are 1+ on the right side, and 1+ on the left side. Pulmonary/Chest: Effort normal. She has no wheezes. She has no rhonchi. She has no rales. Abdominal: Soft. Normal appearance. She exhibits no abdominal bruit and no mass. There is no hepatosplenomegaly. There is no tenderness.    Musculoskeletal: She exhibits edema (trace-1+). Lymphadenopathy:     She has no cervical adenopathy. Right: No supraclavicular adenopathy present. Left: No supraclavicular adenopathy present. Neurological: She is alert and oriented to person, place, and time. No sensory deficit. Skin: Skin is warm, dry and intact. No rash noted. Psychiatric: She has a normal mood and affect. Her behavior is normal.   Nursing note and vitals reviewed. Results for orders placed or performed in visit on 04/12/18   HEMOGLOBIN A1C WITH EAG   Result Value Ref Range    Hemoglobin A1c 7.7 (H) 4.8 - 5.6 %    Estimated average glucose 174 mg/dL   PROTHROMBIN TIME + INR   Result Value Ref Range    INR 1.6 (H) 0.8 - 1.2    Prothrombin time 16.2 (H) 9.1 - 25.9 sec   METABOLIC PANEL, BASIC   Result Value Ref Range    Glucose 141 (H) 65 - 99 mg/dL    BUN 26 (H) 6 - 24 mg/dL    Creatinine 1.67 (H) 0.57 - 1.00 mg/dL    GFR est non-AA 33 (L) >59 mL/min/1.73    GFR est AA 39 (L) >59 mL/min/1.73    BUN/Creatinine ratio 16 9 - 23    Sodium 136 134 - 144 mmol/L    Potassium 4.7 3.5 - 5.2 mmol/L    Chloride 99 96 - 106 mmol/L    CO2 19 18 - 29 mmol/L    Calcium 8.6 (L) 8.7 - 10.2 mg/dL     Lab Results   Component Value Date/Time    Cholesterol, total 158 08/16/2017 08:16 AM    HDL Cholesterol 29 (L) 08/16/2017 08:16 AM    LDL, calculated 85 08/16/2017 08:16 AM    VLDL, calculated 44 (H) 08/16/2017 08:16 AM    Triglyceride 221 (H) 08/16/2017 08:16 AM       ASSESSMENT and PLAN    ICD-10-CM ICD-9-CM    1. Uncontrolled type 2 diabetes mellitus with stage 4 chronic kidney disease, with long-term current use of insulin ([facility]) W39.39 623.14 METABOLIC PANEL, BASIC    A90.62 585.4 HEMOGLOBIN A1C WITH EAG    N18.4 V58.67 CBC WITH AUTOMATED DIFF    Z79.4     2. 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, associated with type 2 diabetes mellitus ([address] K89.3824 250.50      362.02      361.81    3.  PAF (paroxysmal atrial fibrillation) (HCC) I48.0 427.31    4. Essential hypertension I10 401.9    5. Mixed hyperlipidemia E78.2 272.2    6. Obesity, Class II, BMI 35-39.9, with comorbidity E66.9 278.00    7. Sick sinus syndrome (HCC) I49.5 427.81    8. Long term (current) use of anticoagulants Z79.01 V58.61    9. Screening for depression Z13.89 V79.0 BEHAV ASSMT W/SCORE & DOCD/STAND INSTRUMENT     Diagnoses and all orders for this visit:    1. Uncontrolled type 2 diabetes mellitus with stage 4 chronic kidney disease, with long-term current use of insulin (HCC)  Diabetes Mellitus: poorly controlledIs taking statin. Issues reviewed with her: low cholesterol diet, weight control and daily exercise discussed and glycohemoglobin and other lab monitoring discussed. Is not taking ACE/ARBmdue to renal disease and risk of hyperkalemia. .    -     METABOLIC PANEL, BASIC; Future  -     HEMOGLOBIN A1C WITH EAG; Future  -     CBC WITH AUTOMATED DIFF; Future    2. 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, associated with type 2 diabetes mellitus ([facility]  Retinopathy followed by ophthalmologists. 3. PAF (paroxysmal atrial fibrillation) (HCC)  Anticoagulated. Follow Protime/INR's    4. Essential hypertension  Blood pressure is high today. Has been difficult to control. Addition of decongestant may be at least partially responsible for today's elevated reading. Asked to discontinue. Will follow up with nephrologist for blood pressure treatment recommendation. 5. Mixed hyperlipidemia  Cholesterol is controlled. Triglyceride slightly high. Continue current therapy     6. Obesity, Class II, BMI 35-39.9, with comorbidity  Discussed using smaller plate for portion control, keeping a food diary for awareness of food consumed, checking weight often, and increasing physical activity. Will re-evaluate next visit. 7. Sick sinus syndrome (HCC)  Stable     8.  Long term (current) use of anticoagulants  Warfarin adjusted on 4/12. Repeat Protime/INR next week. 9. Screening for depression-Negative  -     BEHAV ASSMT W/SCORE & DOCD/STAND INSTRUMENT      Follow-up Disposition:  Return in about 4 months (around 8/19/2018) for Dm, HTN, CKD  NON-fasting lab one week prrior . lab results and schedule of future lab studies reviewed with patient  reviewed diet, exercise and weight control  cardiovascular risk and specific lipid/LDL goals reviewed  Discussed the patient's BMI with her. The BMI follow up plan is as follows:   dietary management education, guidance, and counseling  encourage exercise  monitor weight  I have discussed the diagnosis, evaluation and treatment options and the intended plan with the patient. Patient is in agreement. The patient has received an after-visit summary and questions were answered concerning future plans. I have discussed side effects and warnings of any new medications with the patient as well.

## 2018-04-19 NOTE — MR AVS SNAPSHOT
[address] [address] [phone] Patient: Elvira Tee MRN: [id number] [id number] Visit Information Date & Time Provider Department Dept. Phone Encounter #  
 4/19/2018  3:00 PM Mee Hopkins MD [facility] of [address] [place] [address] [id number] Follow-up Instructions Return in about 4 months (around 8/19/2018) for Dm, HTN, CKD  NON-fasting lab one week prrior . Upcoming Health Maintenance Date Due  
 PAP AKA CERVICAL CYTOLOGY 11/17/2017 EYE EXAM RETINAL OR DILATED Q1 1/30/2018 Pneumococcal 19-64 Highest Risk (2 of 3 - PCV13) 11/11/2025* FOOT EXAM Q1 5/22/2018 MICROALBUMIN Q1 8/16/2018 LIPID PANEL Q1 8/16/2018 HEMOGLOBIN A1C Q6M 10/12/2018 COLONOSCOPY 4/21/2020 DTaP/Tdap/Td series (2 - Td) 6/13/2026 *Topic was postponed. The date shown is not the original due date. Allergies as of 4/19/2018  Review Complete On: 4/19/2018 By: Mee Hopkins MD  
  
 Severity Noted Reaction Type Reactions Ace Inhibitors  03/14/2012    Cough Amlodipine  01/02/2017    Swelling Avapro [Irbesartan]  03/14/2012    Unable to Obtain Bactrim [Sulfamethoxazole-trimethoprim]  04/13/2010    Other (comments) Sore mouth Captopril  04/13/2010    Cough Carvedilol  07/19/2016   Side Effect Diarrhea Contrast Agent [Iodine]  04/13/2010    Itching Fish Containing Products  10/29/2013    Hives Morphine  04/13/2010    Nausea and Vomiting, Swelling Penicillins  04/13/2010    Hives Pravachol [Pravastatin]  04/13/2010    Nausea Only Seafood [Shellfish Containing Products]  03/14/2012    Hives Singulair [Montelukast]  04/13/2010    Other (comments)  
 palpitations Current Immunizations  Reviewed on 4/19/2018 Name Date Influenza Vaccine 10/6/2017, 10/1/2016, 10/1/2015, 10/5/2014  Influenza Vaccine PF 11/1/2013  8:55 AM  
 Influenza Vaccine Split 11/7/2011  1:58 PM, 11/30/2010 Tdap 6/13/2016 ZZZ-RETIRED (DO NOT USE) Pneumococcal Vaccine (Unspecified Type) 10/10/2009 Reviewed by Jennifer Ellis LPN on 3/91/3716 at  3:52 PM  
You Were Diagnosed With   
  
 Codes Comments Uncontrolled type 2 diabetes mellitus with stage 4 chronic kidney disease, with long-term current use of insulin (HCC)    -  Primary ICD-10-CM: E11.22, E11.65, N18.4, Z79.4 ICD-9-CM: 250.52, 585.4, V58.67 PAF (paroxysmal atrial fibrillation) ([facility])     ICD-10-CM: I48.0 ICD-9-CM: 427.31 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, associated with diabetes mellitus due to underlying condition ([facility]     ICD-10-CM: K70.8017 ICD-9-CM: 249.50, 362.02, 361.81 Essential hypertension     ICD-10-CM: I10 
ICD-9-CM: 401.9 Mixed hyperlipidemia     ICD-10-CM: E78.2 ICD-9-CM: 272.2 Obesity, Class II, BMI 35-39.9, with comorbidity     ICD-10-CM: E66.9 ICD-9-CM: 278.00 Vitals BP Pulse Temp Resp Height(growth percentile) Weight(growth percentile) 184/80 (BP 1 Location: Left arm, BP Patient Position: Sitting) 63 98.1 °F (36.7 °C) (Oral) 20 5' 9\" (1.753 m) 270 lb (122.5 kg) SpO2 BMI OB Status Smoking Status 96% 39.87 kg/m2 Postmenopausal Never Smoker BMI and BSA Data Body Mass Index Body Surface Area  
 39.87 kg/m 2 2.44 m 2 Preferred Pharmacy Pharmacy Name Phone [facility] [address], [address] [phone] Your Updated Medication List  
  
   
This list is accurate as of 4/19/18  5:08 PM.  Always use your most recent med list.  
  
  
  
  
 bumetanide 1 mg tablet Commonly known as:  Hesham Lehi TAKE ONE TABLET BY MOUTH TWICE A DAY (GENERIC FOR Hesham Mariama) busPIRone 10 mg tablet Commonly known as:  BUSPAR Take one po twice daily  
  
 calcium carbonate 200 mg calcium (500 mg) Chew Commonly known as:  TUMS Take 1 Tab by mouth three (3) times daily (after meals). cholecalciferol (VITAMIN D3) 5,000 unit Tab tablet Commonly known as:  VITAMIN D3 Take 5,000 Units by mouth daily. cloNIDine HCl 0.3 mg tablet Commonly known as:  CATAPRES  
TAKE ONE TABLET BY MOUTH TWICE A DAY  
  
 cyanocobalamin 1,000 mcg sublingual tablet Commonly known as:  VITAMIN B-12 Take 1,000 mcg by mouth daily. doxazosin 2 mg tablet Commonly known as:  CARDURA TAKE ONE TABLET BY MOUTH ONCE NIGHTLY FLINTSTONES COMPLETE (IRON) chewable tablet Generic drug:  flintstones complete Take 1 Tab by mouth daily. hydrALAZINE 100 mg tablet Commonly known as:  APRESOLINE  
TAKE ONE TABLET BY MOUTH THREE TIMES A DAY  
  
 insulin lispro 100 unit/mL kwikpen Commonly known as:  HUMALOG  
2 units with dinner for sugars over 200 LANTUS U-100 INSULIN 100 unit/mL injection Generic drug:  insulin glargine INJECT 15 UNITS UNDER THE SKIN ONCE DAILY  
  
 metOLazone 5 mg tablet Commonly known as:  Janice Bleak Take 1 Tab by mouth daily as needed (take if develop increased LE edema, weight gain > 5 pounds. ). metoprolol succinate 100 mg tablet Commonly known as:  TOPROL-XL  
TAKE TWO TABLETS BY MOUTH DAILY potassium chloride SR 10 mEq tablet Commonly known as:  KLOR-CON 10  
TAKE ONE TABLET BY MOUTH DAILY  
  
 sertraline 50 mg tablet Commonly known as:  ZOLOFT Take 1 Tab by mouth daily. TRUEPLUS INSULIN 0.5 mL 31 gauge x 5/16 Syrg Generic drug:  Insulin Syringe-Needle U-100 TYLENOL PM PO Take 2 Tabs by mouth nightly as needed. vitamin A 8,000 unit capsule Take 8,000 Units by mouth daily. warfarin 4 mg tablet Commonly known as:  COUMADIN Take 1.5 tablets 4/13 only, then 0.5 tablet on Mon and Fri and 1 tablet the other 5 days Follow-up Instructions Return in about 4 months (around 8/19/2018) for Dm, HTN, CKD  NON-fasting lab one week prrior . To-Do List   
 08/19/2018 Lab:  CBC WITH AUTOMATED DIFF   
  
 08/19/2018 Lab:  HEMOGLOBIN A1C WITH EAG   
  
 08/19/2018 Lab:  METABOLIC PANEL, BASIC Patient Instructions Office visit in 4 months with non-fasting lab work a week before visit. Body Mass Index: Care Instructions Your Care Instructions Body mass index (BMI) can help you see if your weight is raising your risk for health problems. It uses a formula to compare how much you weigh with how tall you are. · A BMI lower than 18.5 is considered underweight. · A BMI between 18.5 and 24.9 is considered healthy. · A BMI between 25 and 29.9 is considered overweight. A BMI of 30 or higher is considered obese. If your BMI is in the normal range, it means that you have a lower risk for weight-related health problems. If your BMI is in the overweight or obese range, you may be at increased risk for weight-related health problems, such as high blood pressure, heart disease, stroke, arthritis or joint pain, and diabetes. If your BMI is in the underweight range, you may be at increased risk for health problems such as fatigue, lower protection (immunity) against illness, muscle loss, bone loss, hair loss, and hormone problems. BMI is just one measure of your risk for weight-related health problems. You may be at higher risk for health problems if you are not active, you eat an unhealthy diet, or you drink too much alcohol or use tobacco products. Follow-up care is a key part of your treatment and safety. Be sure to make and go to all appointments, and call your doctor if you are having problems. It's also a good idea to know your test results and keep a list of the medicines you take. How can you care for yourself at home? · Practice healthy eating habits.  This includes eating plenty of fruits, vegetables, whole grains, lean protein, and low-fat dairy. · If your doctor recommends it, get more exercise. Walking is a good choice. Bit by bit, increase the amount you walk every day. Try for at least 30 minutes on most days of the week. · Do not smoke. Smoking can increase your risk for health problems. If you need help quitting, talk to your doctor about stop-smoking programs and medicines. These can increase your chances of quitting for good. · Limit alcohol to 2 drinks a day for men and 1 drink a day for women. Too much alcohol can cause health problems. If you have a BMI higher than 25 · Your doctor may do other tests to check your risk for weight-related health problems. This may include measuring the distance around your waist. A waist measurement of more than 40 inches in men or 35 inches in women can increase the risk of weight-related health problems. · Talk with your doctor about steps you can take to stay healthy or improve your health. You may need to make lifestyle changes to lose weight and stay healthy, such as changing your diet and getting regular exercise. If you have a BMI lower than 18.5 · Your doctor may do other tests to check your risk for health problems. · Talk with your doctor about steps you can take to stay healthy or improve your health. You may need to make lifestyle changes to gain or maintain weight and stay healthy, such as getting more healthy foods in your diet and doing exercises to build muscle. Where can you learn more? Go to http://dona-windy.info/. Enter S176 in the search box to learn more about \"Body Mass Index: Care Instructions. \" Current as of: October 13, 2016 Content Version: 11.4 © 5134-0881 [employer]wise[employer]. Care instructions adapted under license by [facility] (which disclaims liability or warranty for this information).  If you have questions about a medical condition or this instruction, always ask your healthcare professional. [address] any warranty or liability for your use of this information. Introducing [facility]! Dear Paty Puga: 
Thank you for requesting a [employer] account. Our records indicate that you already have an active [employer] account. You can access your account anytime at [email or web address] Did you know that you can access your hospital and ER discharge instructions at any time in [employer] You can also review all of your test results from your hospital stay or ER visit. Additional Information If you have questions, please visit the Frequently Asked Questions section of the [employer] website at [email or web address] Remember, [employer] is NOT to be used for urgent needs. For medical emergencies, dial 911. Now available from your iPhone and Android! Please provide this summary of care documentation to your next provider. Your primary care clinician is listed as Mai Tineo. If you have any questions after today's visit, please call [phone].

## 2018-04-25 PROBLEM — E11.3522: Status: ACTIVE | Noted: 2018-04-25

## 2018-05-15 DIAGNOSIS — F43.0 ANXIETY AS ACUTE REACTION TO GROSS STRESS: ICD-10-CM

## 2018-05-15 DIAGNOSIS — F41.1 ANXIETY AS ACUTE REACTION TO GROSS STRESS: ICD-10-CM

## 2018-05-15 RX ORDER — BUSPIRONE HYDROCHLORIDE 10 MG/1
TABLET ORAL
Qty: 60 TAB | Refills: 11 | Status: SHIPPED | OUTPATIENT
Start: 2018-05-15 | End: 2019-05-23 | Stop reason: SDUPTHER

## 2018-05-15 NOTE — TELEPHONE ENCOUNTER
From: Elvira Tee  To: Mee Hopkins MD  Sent: 5/15/2018 11:35 AM EDT  Subject: Medication Renewal Request    Original authorizing provider: Mee Hopkins MD    Media Shall.  Gena Rajan would like a refill of the following medications:  busPIRone (BUSPAR) 10 mg tablet Mee Hopkins MD]    Preferred pharmacy: [facility], [address]    Comment:

## 2018-05-16 RX ORDER — BUSPIRONE HYDROCHLORIDE 10 MG/1
TABLET ORAL
Qty: 60 TAB | Refills: 1 | OUTPATIENT
Start: 2018-05-16

## 2018-05-21 ENCOUNTER — APPOINTMENT (OUTPATIENT)
Dept: INTERNAL MEDICINE CLINIC | Facility: CLINIC | Age: 58
End: 2018-05-21

## 2018-05-21 DIAGNOSIS — I48.0 PAF (PAROXYSMAL ATRIAL FIBRILLATION) (HCC): ICD-10-CM

## 2018-05-22 ENCOUNTER — TELEPHONE (OUTPATIENT)
Dept: INTERNAL MEDICINE CLINIC | Facility: CLINIC | Age: 58
End: 2018-05-22

## 2018-05-22 DIAGNOSIS — I48.0 PAF (PAROXYSMAL ATRIAL FIBRILLATION) (HCC): Primary | ICD-10-CM

## 2018-05-22 LAB
INR PPP: 2.7 (ref 0.8–1.2)
PROTHROMBIN TIME: 27 SEC (ref 9.1–12)

## 2018-05-22 NOTE — PROGRESS NOTES
Inform patient INR is therapeutic. Confirm current dose of warfarin 4 mg tablets Take 0.5 tabs on Mon and Fri and 1 tab the other 5 days. . No change in warfarin dose. Repeat in one month. Also sent in My Chart.

## 2018-06-06 RX ORDER — HYDRALAZINE HYDROCHLORIDE 100 MG/1
TABLET, FILM COATED ORAL
Qty: 90 TAB | Refills: 0 | Status: SHIPPED | OUTPATIENT
Start: 2018-06-06 | End: 2018-07-24 | Stop reason: SDUPTHER

## 2018-06-11 ENCOUNTER — OFFICE VISIT (OUTPATIENT)
Dept: INTERNAL MEDICINE CLINIC | Facility: CLINIC | Age: 58
End: 2018-06-11

## 2018-06-11 VITALS
RESPIRATION RATE: 20 BRPM | TEMPERATURE: 98.3 F | DIASTOLIC BLOOD PRESSURE: 74 MMHG | SYSTOLIC BLOOD PRESSURE: 186 MMHG | HEART RATE: 69 BPM | BODY MASS INDEX: 40.29 KG/M2 | HEIGHT: 69 IN | OXYGEN SATURATION: 96 % | WEIGHT: 272 LBS

## 2018-06-11 DIAGNOSIS — I10 ESSENTIAL HYPERTENSION: ICD-10-CM

## 2018-06-11 DIAGNOSIS — H61.23 IMPACTED CERUMEN, BILATERAL: ICD-10-CM

## 2018-06-11 DIAGNOSIS — I48.0 PAF (PAROXYSMAL ATRIAL FIBRILLATION) (HCC): ICD-10-CM

## 2018-06-11 DIAGNOSIS — R49.0 HOARSENESS: Primary | ICD-10-CM

## 2018-06-11 DIAGNOSIS — Z85.3 HISTORY OF BREAST CANCER: ICD-10-CM

## 2018-06-11 PROBLEM — E66.01 SEVERE OBESITY (BMI 35.0-35.9 WITH COMORBIDITY) (HCC): Status: ACTIVE | Noted: 2017-05-01

## 2018-06-11 RX ORDER — DOXAZOSIN 4 MG/1
TABLET ORAL
Qty: 30 TAB | Refills: 11 | Status: SHIPPED | OUTPATIENT
Start: 2018-06-11 | End: 2019-06-13 | Stop reason: SDUPTHER

## 2018-06-11 NOTE — MR AVS SNAPSHOT
[address] [address] [phone] Patient: Carrington Land MRN: [id number] Visit Information Date & Time Provider Department Dept. Phone Encounter #  
 6/11/2018  1:00 PM MD Octavio Shrestha [facility] Internal Medicine of [address] [place] [address] [id number] Follow-up Instructions Return in about 2 months (around 8/21/2018). Your Appointments 6/18/2018  3:45 PM  
LAB with LAB [facility] Internal Medicine [facility] ([address]) Appt Note: INR [place] [facility] 7 [phone]  
  
   
 [address] [place] [address] 8/14/2018  3:45 PM  
LAB with LAB [facility] Internal Medicine [facility] ([address] [place] [address] Road) Appt Note: labs [place] Mily Gonzales 7 [phone]  
  
    
 8/21/2018  9:15 AM  
ACUTE CARE with MD Loren Shresthappard [facility] Internal Medicine of [facility]) Appt Note: 4mth f/u  
 [address] [address] [phone]  
  
   
 [address] [place] [address] Upcoming Health Maintenance Date Due  
 PAP AKA CERVICAL CYTOLOGY 11/17/2017 EYE EXAM RETINAL OR DILATED Q1 1/30/2018 FOOT EXAM Q1 5/22/2018 Pneumococcal 19-64 Highest Risk (2 of 3 - PCV13) 11/11/2025* Influenza Age 5 to Adult 8/1/2018 MICROALBUMIN Q1 8/16/2018 LIPID PANEL Q1 8/16/2018 HEMOGLOBIN A1C Q6M 10/12/2018 COLONOSCOPY 4/21/2020 DTaP/Tdap/Td series (2 - Td) 6/13/2026 *Topic was postponed. The date shown is not the original due date. Allergies as of 6/11/2018  Review Complete On: 6/11/2018 By: Darien Sylvester MD  
  
 Severity Noted Reaction Type Reactions Ace Inhibitors  03/14/2012    Cough Amlodipine  01/02/2017    Swelling Avapro [Irbesartan]  03/14/2012    Unable to Obtain Bactrim [Sulfamethoxazole-trimethoprim]  04/13/2010    Other (comments) Sore mouth Captopril  04/13/2010    Cough Carvedilol  07/19/2016   Side Effect Diarrhea Contrast Agent [Iodine]  04/13/2010    Itching Fish Containing Products  10/29/2013    Hives Morphine  04/13/2010    Nausea and Vomiting, Swelling Penicillins  04/13/2010    Hives Pravachol [Pravastatin]  04/13/2010    Nausea Only Seafood [Shellfish Containing Products]  03/14/2012    Hives Singulair [Montelukast]  04/13/2010    Other (comments)  
 palpitations Current Immunizations  Reviewed on 6/11/2018 Name Date Influenza Vaccine 10/6/2017, 10/1/2016, 10/1/2015, 10/5/2014 Influenza Vaccine PF 11/1/2013  8:55 AM  
 Influenza Vaccine Split 11/7/2011  1:58 PM, 11/30/2010 Tdap 6/13/2016 ZZZ-RETIRED (DO NOT USE) Pneumococcal Vaccine (Unspecified Type) 10/10/2009 Reviewed by Wilmer Lopez LPN on 9/09/8172 at 39:79 PM  
You Were Diagnosed With   
  
 Codes Comments Hoarseness    -  Primary ICD-10-CM: R49.0 ICD-9-CM: 784.42 Impacted cerumen, bilateral     ICD-10-CM: H61.23 
ICD-9-CM: 380.4 Essential hypertension     ICD-10-CM: I10 
ICD-9-CM: 401.9 PAF (paroxysmal atrial fibrillation) (HCC)     ICD-10-CM: I48.0 ICD-9-CM: 427.31 Uncontrolled type 2 diabetes mellitus with stage 4 chronic kidney disease, with long-term current use of insulin (HCC)     ICD-10-CM: E11.22, E11.65, N18.4, Z79.4 ICD-9-CM: 250.52, 585.4, V58.67 Vitals BP Pulse Temp Resp Height(growth percentile) Weight(growth percentile) 186/74 (BP 1 Location: Left arm, BP Patient Position: Sitting) 69 98.3 °F (36.8 °C) (Oral) 20 5' 9\" (1.753 m) 272 lb (123.4 kg) SpO2 BMI OB Status Smoking Status 96% 40.17 kg/m2 Postmenopausal Never Smoker Vitals History BMI and BSA Data Body Mass Index Body Surface Area  
 40.17 kg/m 2 2.45 m 2 Preferred Pharmacy Pharmacy Name Phone [facility] [address], [address] [phone] Your Updated Medication List  
  
   
This list is accurate as of 6/11/18  2:01 PM.  Always use your most recent med list.  
  
  
  
  
 bumetanide 1 mg tablet Commonly known as:  Odum Hora TAKE ONE TABLET BY MOUTH TWICE A DAY (GENERIC FOR Odum Hora) busPIRone 10 mg tablet Commonly known as:  BUSPAR Take one po twice daily  
  
 calcium carbonate 200 mg calcium (500 mg) Chew Commonly known as:  TUMS Take 1 Tab by mouth three (3) times daily (after meals). cholecalciferol (VITAMIN D3) 5,000 unit Tab tablet Commonly known as:  VITAMIN D3 Take 5,000 Units by mouth daily. cloNIDine HCl 0.3 mg tablet Commonly known as:  CATAPRES  
TAKE ONE TABLET BY MOUTH TWICE A DAY  
  
 cyanocobalamin 1,000 mcg sublingual tablet Commonly known as:  VITAMIN B-12 Take 1,000 mcg by mouth daily. doxazosin 4 mg tablet Commonly known as:  CARDURA TAKE ONE TABLET BY MOUTH ONCE NIGHTLY FLINTSTONES COMPLETE (IRON) chewable tablet Generic drug:  flintstones complete Take 1 Tab by mouth daily. hydrALAZINE 100 mg tablet Commonly known as:  APRESOLINE  
TAKE ONE TABLET BY MOUTH THREE TIMES A DAY  
  
 insulin lispro 100 unit/mL kwikpen Commonly known as:  HUMALOG  
2 units with dinner for sugars over 200 LANTUS U-100 INSULIN 100 unit/mL injection Generic drug:  insulin glargine INJECT 15 UNITS UNDER THE SKIN ONCE DAILY  
  
 metOLazone 5 mg tablet Commonly known as:  Elmer Romeo Take 1 Tab by mouth daily as needed (take if develop increased LE edema, weight gain > 5 pounds. ). metoprolol succinate 100 mg tablet Commonly known as:  TOPROL-XL  
TAKE TWO TABLETS BY MOUTH DAILY potassium chloride SR 10 mEq tablet Commonly known as:  KLOR-CON 10  
TAKE ONE TABLET BY MOUTH DAILY  
  
 sertraline 50 mg tablet Commonly known as:  ZOLOFT  
 TAKE ONE AND ONE-HALF (1 & 1/2) TABLET BY MOUTH DAILY  
  
 TRUEPLUS INSULIN 0.5 mL 31 gauge x 5/16 Syrg Generic drug:  Insulin Syringe-Needle U-100 TYLENOL PM PO Take 2 Tabs by mouth nightly as needed. vitamin A 8,000 unit capsule Take 8,000 Units by mouth daily. warfarin 4 mg tablet Commonly known as:  COUMADIN Take 0.5 tabs on Mon and Fri and 1 tab the other 5 days. Prescriptions Sent to Pharmacy Refills  
 doxazosin (CARDURA) 4 mg tablet 11 Sig: TAKE ONE TABLET BY MOUTH ONCE NIGHTLY Class: Normal  
 Pharmacy: [facility] [address], [address]  Ph #: [phone] We Performed the Following CBC WITH AUTOMATED DIFF [45744 CPT(R)] HEMOGLOBIN A1C WITH EAG [20303 CPT(R)] METABOLIC PANEL, BASIC [52247 CPT(R)] PROTHROMBIN TIME + INR [36426 CPT(R)] REFERRAL TO ENT-OTOLARYNGOLOGY [TME24 Custom] REMOVE IMPACTED EAR WAX [64029 CPT(R)] TSH REFLEX TO T4 [97197 CPT(R)] Follow-up Instructions Return in about 2 months (around 8/21/2018). To-Do List   
 08/11/2018 Lab:  HEMOGLOBIN A1C WITH EAG   
  
 08/11/2018 Lab:  METABOLIC PANEL, BASIC Referral Information Referral ID Referred By Referred To  
  
 [id number] Shweta Gurrola MD   
   [address] Phone: [phone] Fax: [phone] Visits Status Start Date End Date 1 New Request 6/11/18 6/11/19 If your referral has a status of pending review or denied, additional information will be sent to support the outcome of this decision. Patient Instructions Increase doxazosin to 4 mg at bedtime See ENT doctor. Return as scheduled in August.  
 
 
  
Hoarseness: Care Instructions Your Care Instructions Many things can cause your voice to become rough, raspy, or hard to hear. Having a cold or a sinus infection, talking too loudly or yelling, smoking, or breathing dry air can cause a hoarse voice. You also can have voice problems from pollution and allergies. Sometimes, acid from your stomach can back up into your throat-called acid reflux-and change your voice. In some cases, a problem with the voice box, or larynx, causes hoarseness. Rest and home care may be all you need if you have a hoarse voice. Follow-up care is a key part of your treatment and safety. Be sure to make and go to all appointments, and call your doctor if you are having problems. It's also a good idea to know your test results and keep a list of the medicines you take. How can you care for yourself at home? · Follow your doctor's advice about how much to talk. Use email or write notes when you can to rest your voice. · If your doctor prescribed antibiotics, take them as directed. Do not stop taking them just because you feel better. You need to take the full course of antibiotics. · Talk to your doctor about treatment for allergies if you do not already treat them. · Follow your treatment plan for acid reflux (if you have the condition): ¨ Take your medicines exactly as prescribed. Call your doctor if you think you are having a problem with your medicine. ¨ Limit or stop eating foods that make your acid reflux worse. These may include tomatoes, spicy foods, and chocolate. ¨ Limit or stop drinking alcohol and drinks that have caffeine, such as coffee, tea, and tara. ¨ Raise the head of your bed a few inches. Place a brick or a foam wedge under the mattress at the head of the bed. This will help keep stomach acid out of your throat at night. · When you do talk, do not whisper. It can be hard on your voice. · Use a vaporizer or humidifier to add moisture to your bedroom. Follow the directions for cleaning the machine.  
· Drink plenty of water to keep your throat moist. If you have kidney, heart, or liver disease and have to limit fluids, talk with your doctor before you increase the amount of fluids you drink. · Do not smoke. Smoking can make your voice raspy and can increase your risk of throat cancer. If you need help quitting, talk to your doctor about stop-smoking programs and medicines. These can increase your chances of quitting for good. · To keep your voice from getting hoarse in the future, try not to talk loudly or shout, such as at sports events. When should you call for help? Call 911 anytime you think you may need emergency care. For example, call if: 
? · You have trouble breathing. ?Call your doctor now or seek immediate medical care if: 
? · You have trouble swallowing. ? · You have new or worse pain. ? Watch closely for changes in your health, and be sure to contact your doctor if: 
? · You do not get better as expected. Where can you learn more? Go to http://dona-windy.info/. Enter P454 in the search box to learn more about \"Hoarseness: Care Instructions. \" Current as of: May 12, 2017 Content Version: 11.4 © 6365-3867 f4samurai. Care instructions adapted under license by [employer] (which disclaims liability or warranty for this information). If you have questions about a medical condition or this instruction, always ask your healthcare professional. [address] any warranty or liability for your use of this information. Introducing [facility]! Dear Anthony Soriano: 
Thank you for requesting a Avansera account. Our records indicate that you already have an active Avansera account. You can access your account anytime at [email or web address] Did you know that you can access your hospital and ER discharge instructions at any time in Avansera? You can also review all of your test results from your hospital stay or ER visit. Additional Information If you have questions, please visit the Frequently Asked Questions section of the InfoDifhart website at [email or web address] Remember, [facility] is NOT to be used for urgent needs. For medical emergencies, dial 911. Now available from your iPhone and Android! Please provide this summary of care documentation to your next provider. Your primary care clinician is listed as Ana Lora. If you have any questions after today's visit, please call [phone].

## 2018-06-11 NOTE — LETTER
NOTIFICATION RETURN TO WORK  
 
6/11/2018 2:22 PM 
 
Ms. Lissette Camargo [facility] [address] To Whom It May Concern: 
 
Lissette Camargo is currently under the care of [address] [place] [address]. She will return to work on: June 12, 2018 If there are questions or concerns please have the patient contact our office.  
 
 
 
Sincerely, 
 
 
 
 
Joseph Flores MD

## 2018-06-11 NOTE — PATIENT INSTRUCTIONS
Increase doxazosin to 4 mg at bedtime  See ENT doctor. Return as scheduled in August.          Hoarseness: Care Instructions  Your Care Instructions    Many things can cause your voice to become rough, raspy, or hard to hear. Having a cold or a sinus infection, talking too loudly or yelling, smoking, or breathing dry air can cause a hoarse voice. You also can have voice problems from pollution and allergies. Sometimes, acid from your stomach can back up into your throat-called acid reflux-and change your voice. In some cases, a problem with the voice box, or larynx, causes hoarseness. Rest and home care may be all you need if you have a hoarse voice. Follow-up care is a key part of your treatment and safety. Be sure to make and go to all appointments, and call your doctor if you are having problems. It's also a good idea to know your test results and keep a list of the medicines you take. How can you care for yourself at home? · Follow your doctor's advice about how much to talk. Use email or write notes when you can to rest your voice. · If your doctor prescribed antibiotics, take them as directed. Do not stop taking them just because you feel better. You need to take the full course of antibiotics. · Talk to your doctor about treatment for allergies if you do not already treat them. · Follow your treatment plan for acid reflux (if you have the condition): ¨ Take your medicines exactly as prescribed. Call your doctor if you think you are having a problem with your medicine. ¨ Limit or stop eating foods that make your acid reflux worse. These may include tomatoes, spicy foods, and chocolate. ¨ Limit or stop drinking alcohol and drinks that have caffeine, such as coffee, tea, and tara. ¨ Raise the head of your bed a few inches. Place a brick or a foam wedge under the mattress at the head of the bed. This will help keep stomach acid out of your throat at night. · When you do talk, do not whisper.  It can be hard on your voice. · Use a vaporizer or humidifier to add moisture to your bedroom. Follow the directions for cleaning the machine. · Drink plenty of water to keep your throat moist. If you have kidney, heart, or liver disease and have to limit fluids, talk with your doctor before you increase the amount of fluids you drink. · Do not smoke. Smoking can make your voice raspy and can increase your risk of throat cancer. If you need help quitting, talk to your doctor about stop-smoking programs and medicines. These can increase your chances of quitting for good. · To keep your voice from getting hoarse in the future, try not to talk loudly or shout, such as at sports events. When should you call for help? Call 911 anytime you think you may need emergency care. For example, call if:  ? · You have trouble breathing. ?Call your doctor now or seek immediate medical care if:  ? · You have trouble swallowing. ? · You have new or worse pain. ? Watch closely for changes in your health, and be sure to contact your doctor if:  ? · You do not get better as expected. Where can you learn more? Go to http://dona-windy.info/. Enter P454 in the search box to learn more about \"Hoarseness: Care Instructions. \"  Current as of: May 12, 2017  Content Version: 11.4  © 6866-5826 [employer]wise[employer]. Care instructions adapted under license by [employer] (which disclaims liability or warranty for this information). If you have questions about a medical condition or this instruction, always ask your healthcare professional. [address] [address] any warranty or liability for your use of this information.

## 2018-06-11 NOTE — PROGRESS NOTES
Zhane Yadav  Identified pt with two pt identifiers(name and ). Chief Complaint   Patient presents with   Zeina Sullivant     couple months and pills get stuck some times       Reviewed record In preparation for visit and have obtained necessary documentation. Has info on advanced directive but has not filled them out. 1. Have you been to the ER, urgent care clinic or hospitalized since your last visit? No     2. Have you seen or consulted any other health care providers outside of the [address] [place] [address] since your last visit? Include any pap smears or colon screening. Dr Masood De Los Santos, iron infusion    Vitals reviewed with provider.     Health Maintenance reviewed:     Health Maintenance Due   Topic    PAP AKA CERVICAL CYTOLOGY     EYE EXAM RETINAL OR DILATED Q1     FOOT EXAM Q1           Wt Readings from Last 3 Encounters:   18 272 lb (123.4 kg)   18 270 lb (122.5 kg)   18 268 lb (121.6 kg)        Temp Readings from Last 3 Encounters:   18 98.3 °F (36.8 °C) (Oral)   18 98.1 °F (36.7 °C) (Oral)   17 98.2 °F (36.8 °C) (Oral)        BP Readings from Last 3 Encounters:   18 187/82   18 184/80   18 144/80        Pulse Readings from Last 3 Encounters:   18 69   18 63   18 76        Vitals:    18 1253 18 1255   BP: (!) 200/93 187/82   Pulse: 69    Resp: 20    Temp: 98.3 °F (36.8 °C)    TempSrc: Oral    SpO2: 96%    Weight: 272 lb (123.4 kg)    Height: 5' 9\" (1.753 m)    PainSc:   7    PainLoc: Knee           Learning Assessment:   :       Learning Assessment 3/25/2014   PRIMARY LEARNER Patient   HIGHEST LEVEL OF EDUCATION - PRIMARY LEARNER  2 YEARS OF COLLEGE   BARRIERS PRIMARY LEARNER NONE   CO-LEARNER CAREGIVER No   PRIMARY LANGUAGE ENGLISH    NEED No   LEARNER PREFERENCE PRIMARY DEMONSTRATION   LEARNING SPECIAL TOPICS Does does a pacemaker any noise   ANSWERED BY patient   RELATIONSHIP SELF        Depression Screening: :       PHQ over the last two weeks 4/19/2018   Little interest or pleasure in doing things Not at all   Feeling down, depressed or hopeless Not at all   Total Score PHQ 2 0        Fall Risk Assessment:   :     No flowsheet data found. Abuse Screening:   :     No flowsheet data found.      ADL Screening:   :       ADL Assessment 11/17/2014   Feeding yourself No Help Needed   Getting from bed to chair No Help Needed   Getting dressed No Help Needed   Bathing or showering No Help Needed   Walk across the room (includes cane/walker) No Help Needed   Using the telphone No Help Needed   Taking your medications No Help Needed   Preparing meals No Help Needed   Managing money (expenses/bills) No Help Needed   Moderately strenuous housework (laundry) No Help Needed   Shopping for personal items (toiletries/medicines) No Help Needed   Shopping for groceries No Help Needed   Driving No Help Needed   Climbing a flight of stairs No Help Needed   Getting to places beyond walking distances No Help Needed

## 2018-06-11 NOTE — PROGRESS NOTES
HISTORY OF PRESENT ILLNESS  Lissette Camargo is a 61 y.o. female. HPI  She complains of 2 months of hoarseness that comes and goes. She is hoarse about 30% of the time. It is worse the more she talks, but also might awaken with it. Pills sometimes stick in throat and she is able to cough them up if cannot swallow. Associated symptoms include congestion, cough described as non-productive, nasal congestion, post nasal drip and sneezing. She had some blood tinged mucous, but nose is dry and bleeds. She denies facial pain, shortness of breath, sore throat and wheezing,  fever, chills, or night sweats. Clinical course has been unchanged since that time. She has tried no treatment. Past history is significant for allergic rhinitis. Patient is non-smoker    She has diabetes mellitus with retinopathy, hypertension, chronic kidney disease and secondary anemia, hyperlipidemia, obesity, paroxysmal atrial fibrillation and SSS with pacemaker. Blood pressures have been difficult to control. She stopped going to nephrologist due to cost. She is currently taking doxzasin 2 mg daily, hydralazine 100 mg 3 times a day, clonidine 0.3 mg twice a day, bumetanide 1 mg twice daily, metoprolol succinate 200 mg daily She has a prescription for metolazone to take only for weight gain or increased swelling. She does not tolerate amlodipine due to edema, ACE inhibitors due to cough, carvedilol due to diarrhea and ARB's for unknown reason. However CKD may have played a part. BP last week 160/68 prior to iron infusion. After infusion BP linda significantly to over 133 systolic, but it was also time for medication. On arrival today BP was 200/93. She denies headache, dyspnea, exertional chest pain, palpitations; she has lower extremity edema, no worse than usual.       Patient Active Problem List   Diagnosis Code    Breast ca: f/b Dr Ayana Sullivan C50.919    Asthma J45.909    Fe deficiency anemia D50.9    Status post ablation of atrial fibrillation Z98.890, Z86.79    Sick sinus syndrome ([facility]) I49.5    S/P cardiac pacemaker procedure Z95.0    Long term (current) use of anticoagulants Z79.01    Mixed hyperlipidemia E78.2    CKD (chronic kidney disease), stage IV ([facility]) P04.7    Systolic murmur J34.3    Essential hypertension I10    PAF (paroxysmal atrial fibrillation) ([facility]) I48.0    Anemia in chronic kidney disease N18.9, D63.1    Uncontrolled type 2 diabetes mellitus with stage 4 chronic kidney disease, with long-term current use of insulin ([facility]) E11.22, E11.65, N18.4, Z79.4    Morbid obesity with BMI of 40.0-44.9, adult ([facility]) E66.01, Z68.41    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 Nicole Calles and Dr. Alexandre Cheng Atrial flutter ([facility]     Cancer [facility])     bilateral breast    Chronic kidney disease     Diabetes mellitus type II     Dizziness     Essential hypertension     Hyperlipidemia     Hypertension     Long term (current) use of anticoagulants     [facility] 3/14/2012    Osteoarthritis     Pacemaker     Sick sinus syndrome ([facility])      Past Surgical History:   Procedure Laterality Date    ABDOMEN SURGERY PROC UNLISTED      gastric bypass    GASTRIC BYPASS,OBESE<150CM SAW-EN-Y  7/08    [facility]    HX AFIB ABLATION      HX CARPAL TUNNEL RELEASE      HX CERVICAL FUSION  1994    HX DILATION AND CURETTAGE  1992    HX MASTECTOMY  1998    RIGHT MODIFIED RADICAL     HX MOHS PROCEDURES  1995    right    HX ORTHOPAEDIC      ight knee surgery    HX PACEMAKER      IR INSERT TUNL CVC W PORT OVER 5 YEARS      LAMINECTOMY,CERVICAL  1994    NEEDLE BIOPSY LIVER,W OTHR PROC  8.21.07    NM ANESTH,KNEE AREA SURGERY  1982 AND 1994    NM TRABECULOPLASTY BY LASER SURGERY      US GUIDE ASP OVARIAN CYST ABD APPROACH  8.21.07    RIGHT      Social History     Social History    Marital status:      Spouse name: N/A    Number of children: N/A    Years of education: N/A     Social History Main Topics    Smoking status: Never Smoker    Smokeless tobacco: Never Used    Alcohol use Yes      Comment: rare    Drug use: No    Sexual activity: Not on file     Other Topics Concern    Not on file     Social History Narrative     Family History   Problem Relation Age of Onset    Cancer Mother     Heart Disease Mother     Alcohol abuse Father     Diabetes Brother     Hypertension Brother      Allergies   Allergen Reactions    Ace Inhibitors Cough    Amlodipine Swelling    Avapro [Irbesartan] Unable to Obtain    Bactrim [Sulfamethoxazole-Trimethoprim] Other (comments)     Sore mouth    Captopril Cough    Carvedilol Diarrhea    Contrast Agent [Iodine] Itching    Fish Containing Products Hives    Morphine Nausea and Vomiting and Swelling    Penicillins Hives    Pravachol [Pravastatin] Nausea Only    Seafood [Shellfish Containing Products] Hives    Singulair [Montelukast] Other (comments)     palpitations     Current Outpatient Prescriptions   Medication Sig Dispense Refill    hydrALAZINE (APRESOLINE) 100 mg tablet TAKE ONE TABLET BY MOUTH THREE TIMES A DAY 90 Tab 0    sertraline (ZOLOFT) 50 mg tablet TAKE ONE AND ONE-HALF (1 & 1/2) TABLET BY MOUTH DAILY 45 Tab 5    busPIRone (BUSPAR) 10 mg tablet Take one po twice daily 60 Tab 11    warfarin (COUMADIN) 4 mg tablet Take 0.5 tabs on Mon and Fri and 1 tab the other 5 days.  45 Tab 0    TRUEPLUS INSULIN 0.5 mL 31 gauge x 5/16 syrg       potassium chloride SR (KLOR-CON 10) 10 mEq tablet TAKE ONE TABLET BY MOUTH DAILY 30 Tab 11    doxazosin (CARDURA) 2 mg tablet TAKE ONE TABLET BY MOUTH ONCE NIGHTLY 30 Tab 11    cloNIDine HCl (CATAPRES) 0.3 mg tablet TAKE ONE TABLET BY MOUTH TWICE A DAY 60 Tab 11    bumetanide (BUMEX) 1 mg tablet TAKE ONE TABLET BY MOUTH TWICE A DAY (GENERIC FOR BUMEX) 60 Tab 5    LANTUS 100 unit/mL injection INJECT 15 UNITS UNDER THE SKIN ONCE DAILY 10 mL 10    metoprolol succinate (TOPROL-XL) 100 mg tablet TAKE TWO TABLETS BY MOUTH DAILY 60 Tab 11    metolazone (ZAROXOLYN) 5 mg tablet Take 1 Tab by mouth daily as needed (take if develop increased LE edema, weight gain > 5 pounds. ). 30 Tab 1    cholecalciferol, VITAMIN D3, (VITAMIN D3) 5,000 unit tab tablet Take 5,000 Units by mouth daily.  vitamin A 8,000 unit capsule Take 8,000 Units by mouth daily.  ACETAMINOPHEN/DIPHENHYDRAMINE (TYLENOL PM PO) Take 2 Tabs by mouth nightly as needed.  insulin lispro (HUMALOG) 100 unit/mL kwikpen 2 units with dinner for sugars over 200 1 Package 0    cyanocobalamin (VITAMIN B-12) 1,000 mcg sublingual tablet Take 1,000 mcg by mouth daily.  calcium carbonate (TUMS) 200 mg calcium (500 mg) Chew Take 1 Tab by mouth three (3) times daily (after meals).  flintstones complete (FLINTSTONES COMPLETE) chewable tablet Take 1 Tab by mouth daily. Review of Systems   Constitutional: Positive for malaise/fatigue. Cardiovascular: Positive for leg swelling. Gastrointestinal: Negative for constipation. Some difficulty swallowing pills, but no very often if eating. Skin:        Has noticed change in skin (dry) and change in hair (dry and coarser). Visit Vitals    /82 (BP 1 Location: Left arm, BP Patient Position: Sitting)    Pulse 69    Temp 98.3 °F (36.8 °C) (Oral)    Resp 20    Ht 5' 9\" (1.753 m)    Wt 272 lb (123.4 kg)    SpO2 96%    BMI 40.17 kg/m2     Physical Exam   Constitutional: She is oriented to person, place, and time. She appears well-developed and well-nourished. HENT:   Head: Normocephalic and atraumatic. Right Ear: Tympanic membrane and ear canal normal.   Left Ear: Tympanic membrane and ear canal normal.   Nose: No mucosal edema. Mouth/Throat: Oropharynx is clear and moist and mucous membranes are normal. Mucous membranes are not pale and not dry. No oropharyngeal exudate, posterior oropharyngeal edema or posterior oropharyngeal erythema. Bilateral cerumen impaction. After ear lavage, TM's and ear canals are normal.    Eyes: Conjunctivae are normal. Pupils are equal, round, and reactive to light. Right eye exhibits no discharge. Left eye exhibits no discharge. Neck: Neck supple. Cardiovascular: Normal rate, regular rhythm, S1 normal, S2 normal and normal heart sounds. Exam reveals no gallop. No murmur heard. Pulmonary/Chest: Effort normal and breath sounds normal. She has no wheezes. She has no rhonchi. She has no rales. Lymphadenopathy:     She has no cervical adenopathy. Neurological: She is alert and oriented to person, place, and time. Skin: Skin is warm, dry and intact. No rash noted. Nursing note and vitals reviewed. Lab Results   Component Value Date/Time    Sodium 136 04/12/2018 04:35 PM    Potassium 4.7 04/12/2018 04:35 PM    Chloride 99 04/12/2018 04:35 PM    CO2 19 04/12/2018 04:35 PM    Anion gap 10 05/21/2016 04:38 PM    Glucose 141 (H) 04/12/2018 04:35 PM    BUN 26 (H) 04/12/2018 04:35 PM    Creatinine 1.67 (H) 04/12/2018 04:35 PM    BUN/Creatinine ratio 16 04/12/2018 04:35 PM    GFR est AA 39 (L) 04/12/2018 04:35 PM    GFR est non-AA 33 (L) 04/12/2018 04:35 PM    Calcium 8.6 (L) 04/12/2018 04:35 PM     Lab Results   Component Value Date/Time    Hemoglobin A1c 7.7 (H) 04/12/2018 04:35 PM    Hemoglobin A1c (POC) 7.4 (A) 05/22/2017 03:49 PM       ASSESSMENT and PLAN    ICD-10-CM ICD-9-CM    1. Hoarseness R49.0 784.42 REFERRAL TO ENT-OTOLARYNGOLOGY   2. Impacted cerumen, bilateral H61.23 380.4 REMOVE IMPACTED EAR WAX   3. Essential hypertension I10 401.9 doxazosin (CARDURA) 4 mg tablet   4. PAF (paroxysmal atrial fibrillation) ([facility]) I48.0 427.31 PROTHROMBIN TIME + INR   5.  Uncontrolled type 2 diabetes mellitus with stage 4 chronic kidney disease, with long-term current use of insulin ([facility]) F08.33 094.00 METABOLIC PANEL, BASIC    D38.49 585.4 TSH REFLEX TO T4    N18.4 V58.67 HEMOGLOBIN A1C WITH EAG    Z79.4  CBC WITH AUTOMATED DIFF      HEMOGLOBIN A1C WITH EAG      METABOLIC PANEL, BASIC      CANCELED: CBC WITH AUTOMATED DIFF      CANCELED: HEMOGLOBIN A1C WITH EAG     Diagnoses and all orders for this visit:    1. Hoarseness  She has symptoms consistent with hypothyroidism so will check TSH. She also has allergic rhinitis which is undoubtedly contributing. Will get ENT opinion as well. -     REFERRAL TO ENT-OTOLARYNGOLOGY    2. Impacted cerumen, bilateral  -     REMOVE IMPACTED EAR WAX    3. Essential hypertension  Hypertension is uncontrolled - medication changes/orders   She is intolerant of several medications, on many at maximum dose, and not willing to see nephrologist (who I explained is expert in HTN management). Will increase doxazosin. Also repeat Creat today to assure stability.   -     doxazosin (CARDURA) 4 mg tablet; TAKE ONE TABLET BY MOUTH ONCE NIGHTLY    4. PAF (paroxysmal atrial fibrillation) ([facility])  Stable with controlled rate. -     PROTHROMBIN TIME + INR    5. Uncontrolled type 2 diabetes mellitus with stage 4 chronic kidney disease, with long-term current use of insulin ([facility]  Not addressed specifically today. Lab ordered for next visit. -     METABOLIC PANEL, BASIC  -     TSH REFLEX TO T4  -     HEMOGLOBIN A1C WITH EAG  -     CBC WITH AUTOMATED DIFF  -     HEMOGLOBIN A1C WITH EAG; Future  -     METABOLIC PANEL, BASIC; Future        Follow-up Disposition:  Return in about 2 months (around 8/21/2018). lab results and schedule of future lab studies reviewed with patient  reviewed diet, exercise and weight control  I have discussed the diagnosis, evaluation and treatment options and the intended plan with the patient. Patient is in agreement.   The patient has received an after-visit summary and questions were answered concerning future plans. I have discussed side effects and warnings of any new medications with the patient as well.

## 2018-06-12 LAB
BUN SERPL-MCNC: 28 MG/DL (ref 6–24)
BUN/CREAT SERPL: 16 (ref 9–23)
CALCIUM SERPL-MCNC: 9.3 MG/DL (ref 8.7–10.2)
CHLORIDE SERPL-SCNC: 103 MMOL/L (ref 96–106)
CO2 SERPL-SCNC: 22 MMOL/L (ref 20–29)
CREAT SERPL-MCNC: 1.71 MG/DL (ref 0.57–1)
GFR SERPLBLD CREATININE-BSD FMLA CKD-EPI: 33 ML/MIN/1.73
GFR SERPLBLD CREATININE-BSD FMLA CKD-EPI: 38 ML/MIN/1.73
GLUCOSE SERPL-MCNC: 182 MG/DL (ref 65–99)
INR PPP: 2.2 (ref 0.8–1.2)
POTASSIUM SERPL-SCNC: 4.7 MMOL/L (ref 3.5–5.2)
PROTHROMBIN TIME: 22.4 SEC (ref 9.1–12)
SODIUM SERPL-SCNC: 139 MMOL/L (ref 134–144)

## 2018-06-15 LAB
SPECIMEN STATUS REPORT, ROLRST: NORMAL
TSH SERPL DL<=0.005 MIU/L-ACNC: 0.76 UIU/ML (ref 0.45–4.5)

## 2018-06-15 NOTE — PROGRESS NOTES
Thyroid test is normal. Unfortunately blood count was not run. You would need a lab appointment to have this done. I recommend doing it. Agapito Sanchez can mail you a paper request if you want to have it drawn at [facility].Patient informed in My Chart.

## 2018-07-23 RX ORDER — METOPROLOL SUCCINATE 100 MG/1
TABLET, EXTENDED RELEASE ORAL
Qty: 60 TAB | Refills: 10 | Status: SHIPPED | OUTPATIENT
Start: 2018-07-23 | End: 2019-07-11 | Stop reason: SDUPTHER

## 2018-08-14 ENCOUNTER — TELEPHONE (OUTPATIENT)
Dept: INTERNAL MEDICINE CLINIC | Facility: CLINIC | Age: 58
End: 2018-08-14

## 2018-08-14 DIAGNOSIS — I48.0 PAF (PAROXYSMAL ATRIAL FIBRILLATION) (HCC): Primary | ICD-10-CM

## 2018-09-04 RX ORDER — BUMETANIDE 1 MG/1
TABLET ORAL
Qty: 60 TAB | Refills: 4 | Status: SHIPPED | OUTPATIENT
Start: 2018-09-04 | End: 2019-02-22 | Stop reason: SDUPTHER

## 2018-09-13 ENCOUNTER — TELEPHONE (OUTPATIENT)
Dept: INTERNAL MEDICINE CLINIC | Facility: CLINIC | Age: 58
End: 2018-09-13

## 2018-09-13 DIAGNOSIS — I48.0 PAF (PAROXYSMAL ATRIAL FIBRILLATION) (HCC): ICD-10-CM

## 2018-09-13 LAB
BASOPHILS # BLD AUTO: 0.1 X10E3/UL (ref 0–0.2)
BASOPHILS NFR BLD AUTO: 1 %
EOSINOPHIL # BLD AUTO: 0.3 X10E3/UL (ref 0–0.4)
EOSINOPHIL NFR BLD AUTO: 5 %
ERYTHROCYTE [DISTWIDTH] IN BLOOD BY AUTOMATED COUNT: 14.2 % (ref 12.3–15.4)
HCT VFR BLD AUTO: 34.2 % (ref 34–46.6)
HGB BLD-MCNC: 11.2 G/DL (ref 11.1–15.9)
IMM GRANULOCYTES # BLD: 0 X10E3/UL (ref 0–0.1)
IMM GRANULOCYTES NFR BLD: 0 %
INR PPP: 4.2 (ref 0.8–1.2)
LYMPHOCYTES # BLD AUTO: 0.9 X10E3/UL (ref 0.7–3.1)
LYMPHOCYTES NFR BLD AUTO: 15 %
MCH RBC QN AUTO: 30.4 PG (ref 26.6–33)
MCHC RBC AUTO-ENTMCNC: 32.7 G/DL (ref 31.5–35.7)
MCV RBC AUTO: 93 FL (ref 79–97)
MONOCYTES # BLD AUTO: 0.5 X10E3/UL (ref 0.1–0.9)
MONOCYTES NFR BLD AUTO: 8 %
NEUTROPHILS # BLD AUTO: 4.3 X10E3/UL (ref 1.4–7)
NEUTROPHILS NFR BLD AUTO: 71 %
PLATELET # BLD AUTO: 303 X10E3/UL (ref 150–379)
PROTHROMBIN TIME: 39.9 SEC (ref 9.1–12)
RBC # BLD AUTO: 3.68 X10E6/UL (ref 3.77–5.28)
WBC # BLD AUTO: 6.1 X10E3/UL (ref 3.4–10.8)

## 2018-09-13 RX ORDER — WARFARIN 4 MG/1
TABLET ORAL
Qty: 45 TAB | Refills: 3
Start: 2018-09-13 | End: 2018-11-30

## 2018-09-13 NOTE — TELEPHONE ENCOUNTER
Message left to check her mychart and lab appt scheduled for 9/27. Asked to send a response after she views message.

## 2018-09-13 NOTE — TELEPHONE ENCOUNTER
Inform patient INR is much too high. Confirm current dose of warfarin 4 mg tablets half tablet on Mon and Fri and 1 tablet the other 5 days. Remind her to be consistent regarding intake of high vitamin K containing foods. Change warfarin 4 mg tablets to none today, then a half tablet on 9/14 and 9/15; from then one take a half tablet on Mon-Wed-Fri and one tablet the other 4 days. Repeat INR in  2 weeks. CBC is normal.   Message also sent in My Chart.

## 2018-09-27 DIAGNOSIS — I48.0 PAF (PAROXYSMAL ATRIAL FIBRILLATION) (HCC): ICD-10-CM

## 2018-09-28 LAB
INR PPP: 3.1 (ref 0.8–1.2)
PROTHROMBIN TIME: 30.1 SEC (ref 9.1–12)

## 2018-09-28 NOTE — PROGRESS NOTES
Inform patient INR is slighlty too high. Confirm current dose of warfarin 4 mg tablets a half tablet on Mon-Wed-Fri and one tablet the other 4 days. . No change in warfarin right now. Repeat INR in 2 weeks. Message also sent in My Chart.

## 2018-10-11 ENCOUNTER — LAB ONLY (OUTPATIENT)
Dept: INTERNAL MEDICINE CLINIC | Facility: CLINIC | Age: 58
End: 2018-10-11

## 2018-10-11 DIAGNOSIS — I48.0 PAF (PAROXYSMAL ATRIAL FIBRILLATION) (HCC): Primary | ICD-10-CM

## 2018-10-12 ENCOUNTER — TELEPHONE (OUTPATIENT)
Dept: INTERNAL MEDICINE CLINIC | Facility: CLINIC | Age: 58
End: 2018-10-12

## 2018-10-12 DIAGNOSIS — I48.0 PAF (PAROXYSMAL ATRIAL FIBRILLATION) (HCC): Primary | ICD-10-CM

## 2018-10-12 LAB
INR PPP: 2.4 (ref 0.8–1.2)
PROTHROMBIN TIME: 23.3 SEC (ref 9.1–12)

## 2018-10-12 NOTE — PROGRESS NOTES
Inform patient INR is therapeutic. Confirm current dose of warfarin 4 mg tablets take a half tablet on Mon-Wed-Fri and one tablet the other 4 days. . No change in warfarin dose. Repeat in 3 weeks to assure stability. Message also sent in My Chart. Deborah Storey

## 2018-11-01 DIAGNOSIS — I48.0 PAF (PAROXYSMAL ATRIAL FIBRILLATION) (HCC): ICD-10-CM

## 2018-11-02 LAB
BUN SERPL-MCNC: 32 MG/DL (ref 6–24)
BUN/CREAT SERPL: 17 (ref 9–23)
CALCIUM SERPL-MCNC: 9.2 MG/DL (ref 8.7–10.2)
CHLORIDE SERPL-SCNC: 102 MMOL/L (ref 96–106)
CO2 SERPL-SCNC: 21 MMOL/L (ref 20–29)
CREAT SERPL-MCNC: 1.91 MG/DL (ref 0.57–1)
EST. AVERAGE GLUCOSE BLD GHB EST-MCNC: 194 MG/DL
GLUCOSE SERPL-MCNC: 132 MG/DL (ref 65–99)
HBA1C MFR BLD: 8.4 % (ref 4.8–5.6)
INR PPP: 2.7 (ref 0.8–1.2)
POTASSIUM SERPL-SCNC: 4.8 MMOL/L (ref 3.5–5.2)
PROTHROMBIN TIME: 26.4 SEC (ref 9.1–12)
SODIUM SERPL-SCNC: 139 MMOL/L (ref 134–144)

## 2018-11-02 NOTE — PROGRESS NOTES
Inform patient INR is therapeutic. Confirm current dose of warfarin 4 mg tablets a half tablet on Mon-Wed-Fri and one tablet the other 4 days . No change in warfarin dose. Repeat in one month.  Message also sent in My Chart

## 2018-11-12 ENCOUNTER — OFFICE VISIT (OUTPATIENT)
Dept: INTERNAL MEDICINE CLINIC | Facility: CLINIC | Age: 58
End: 2018-11-12

## 2018-11-12 VITALS
BODY MASS INDEX: 41.32 KG/M2 | DIASTOLIC BLOOD PRESSURE: 64 MMHG | RESPIRATION RATE: 14 BRPM | OXYGEN SATURATION: 95 % | HEART RATE: 84 BPM | SYSTOLIC BLOOD PRESSURE: 136 MMHG | HEIGHT: 69 IN | TEMPERATURE: 97.9 F | WEIGHT: 279 LBS

## 2018-11-12 DIAGNOSIS — I48.0 PAF (PAROXYSMAL ATRIAL FIBRILLATION) (HCC): ICD-10-CM

## 2018-11-12 DIAGNOSIS — D63.1 ANEMIA IN STAGE 4 CHRONIC KIDNEY DISEASE (HCC): ICD-10-CM

## 2018-11-12 DIAGNOSIS — N18.4 ANEMIA IN STAGE 4 CHRONIC KIDNEY DISEASE (HCC): ICD-10-CM

## 2018-11-12 DIAGNOSIS — Z79.01 LONG TERM (CURRENT) USE OF ANTICOAGULANTS: ICD-10-CM

## 2018-11-12 DIAGNOSIS — Z95.0 S/P CARDIAC PACEMAKER PROCEDURE: ICD-10-CM

## 2018-11-12 DIAGNOSIS — S81.801A OPEN WOUND OF RIGHT LOWER LEG, INITIAL ENCOUNTER: ICD-10-CM

## 2018-11-12 DIAGNOSIS — E11.42 DIABETIC POLYNEUROPATHY ASSOCIATED WITH TYPE 2 DIABETES MELLITUS (HCC): ICD-10-CM

## 2018-11-12 DIAGNOSIS — I10 ESSENTIAL HYPERTENSION: ICD-10-CM

## 2018-11-12 DIAGNOSIS — E66.01 MORBID OBESITY WITH BMI OF 40.0-44.9, ADULT (HCC): ICD-10-CM

## 2018-11-12 DIAGNOSIS — E78.2 MIXED HYPERLIPIDEMIA: ICD-10-CM

## 2018-11-12 NOTE — PROGRESS NOTES
Heather Yadav  Identified pt with two pt identifiers(name and ). Chief Complaint Patient presents with  Diabetes  Blood Pressure Check  Anemia  Immunization/Injection Reviewed record In preparation for visit and have obtained necessary documentation. Has info on advanced directive but has not filled them out. 1. Have you been to the ER, urgent care clinic or hospitalized since your last visit? No  
 
2. Have you seen or consulted any other health care providers outside of the [address] [place] [address] since your last visit? Include any pap smears or colon screening. No 
 
Vitals reviewed with provider. Health Maintenance reviewed: she has not seen an eye dr since 2017, has not seen a gyn. Health Maintenance Due Topic  Shingrix Vaccine Age 50> (1 of 2)  PAP AKA CERVICAL CYTOLOGY  EYE EXAM RETINAL OR DILATED Q1   
 FOOT EXAM Q1   
 MICROALBUMIN Q1   
 LIPID PANEL Q1 Wt Readings from Last 3 Encounters:  
18 279 lb (126.6 kg) 18 272 lb (123.4 kg) 18 270 lb (122.5 kg) Temp Readings from Last 3 Encounters:  
18 97.9 °F (36.6 °C) (Oral) 18 98.3 °F (36.8 °C) (Oral) 18 98.1 °F (36.7 °C) (Oral) BP Readings from Last 3 Encounters:  
18 144/73  
18 186/74  
18 184/80 Pulse Readings from Last 3 Encounters:  
18 84  
18 69  
18 63 Vitals:  
 18 0928 18 0932 BP: 163/75 144/73 Pulse: 84 Resp: 14 Temp: 97.9 °F (36.6 °C) TempSrc: Oral   
SpO2: 95% Weight: 279 lb (126.6 kg) Height: 5' 9\" (1.753 m) PainSc:   9 PainLoc: Leg   
  
 
 
Learning Assessment:  
:  
 
Learning Assessment 3/25/2014 PRIMARY LEARNER Patient HIGHEST LEVEL OF EDUCATION - PRIMARY LEARNER  2 YEARS OF COLLEGE  
BARRIERS PRIMARY LEARNER NONE  
CO-LEARNER CAREGIVER No  
PRIMARY LANGUAGE ENGLISH  NEED No  
LEARNER PREFERENCE PRIMARY DEMONSTRATION  
 LEARNING SPECIAL TOPICS Does does a pacemaker any noise ANSWERED BY patient RELATIONSHIP SELF Depression Screening:  
:  
 
PHQ over the last two weeks 4/19/2018 Little interest or pleasure in doing things Not at all Feeling down, depressed, irritable, or hopeless Not at all Total Score PHQ 2 0 Fall Risk Assessment:  
:  
 
No flowsheet data found. Abuse Screening:  
:  
 
No flowsheet data found. ADL Screening:  
:  
 
ADL Assessment 11/17/2014 Feeding yourself No Help Needed Getting from bed to chair No Help Needed Getting dressed No Help Needed Bathing or showering No Help Needed Walk across the room (includes cane/walker) No Help Needed Using the telphone No Help Needed Taking your medications No Help Needed Preparing meals No Help Needed Managing money (expenses/bills) No Help Needed Moderately strenuous housework (laundry) No Help Needed Shopping for personal items (toiletries/medicines) No Help Needed Shopping for groceries No Help Needed Driving No Help Needed Climbing a flight of stairs No Help Needed Getting to places beyond walking distances No Help Needed

## 2018-11-12 NOTE — PROGRESS NOTES
HISTORY OF PRESENT ILLNESS Chandra Muniz is a 60 y.o. female. HPI She presents for follow up of diabetes mellitus with retinopathy, hypertension, chronic kidney disease and secondary anemia, hyperlipidemia, obesity, paroxysmal atrial fibrillation and SSS with pacemaker. Blood pressures have been difficult to control. She stopped going to nephrologist due to cost      
Diet and Lifestyle: generally follows a low fat low cholesterol diet, generally follows a low sodium diet, follows a diabetic diet regularly, exercises sporadically, nonsmoker Diabetic ROS - medication compliance: compliant most of the time,  
   home glucose monitoring: non-fasting <200 
   home BP Monitoring: not done. further diabetic ROS: no polyuria or polydipsia, no chest pain, dyspnea or TIA's, no unusual visual symptoms, no hypoglycemia, has dysesthesias in the feet. Cardiovascular ROS: 
She complains of lower extremity edema. She denies palpitations, orthopnea, exertional chest pressure/discomfort, claudication, dyspnea on exertion, dizziness Complainig of a wound on right lower leg. Her  is a hoarder. She hit leg on something Lovella Schwalbe/statue or plastic tub). Initial injury was a month ago, but has hit it several times since. Painful, oozing clear fluid. She has kept bandaged and using Neosporin. Patient Active Problem List  
Diagnosis Code  History of breast cancer Z85.3  Asthma J45.909  Fe deficiency anemia D50.9  Status post ablation of atrial fibrillation Z98.890, Z86.79  
 Sick sinus syndrome (HCC) I49.5  S/P cardiac pacemaker procedure Z95.0  Long term (current) use of anticoagulants Z79.01  
 Mixed hyperlipidemia E78.2  CKD (chronic kidney disease), stage IV (HCC) N18.4  Systolic murmur E44.0  Essential hypertension I10  
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  
Diagnosis Date  Acquired lymphedema Right arm  Arrhythmia  Asthma  [facility] Dr. Yolanda Gamez and Dr. Daniela alves [facility])  Cancer ([facility] bilateral breast  
 Chronic kidney disease  Diabetes mellitus type II   
 Dizziness  Essential hypertension  Hyperlipidemia  Hypertension  Long term (current) use of anticoagulants  Morbid obesity ([facility] 3/14/2012  Osteoarthritis  Pacemaker  Sick sinus syndrome ([facility] Past Surgical History:  
Procedure Laterality Date  ABDOMEN SURGERY PROC UNLISTED    
 gastric bypass  GASTRIC [facility],OBESE<150CM [facility]  [facility]  
 [facility] HX AFIB ABLATION    
 HX CARPAL TUNNEL RELEASE [address] [id number] [address] RIGHT MODIFIED RADICAL   
 HX MOHS PROCEDURES  1995  
 right  HX ORTHOPAEDIC    
 ight knee surgery  HX PACEMAKER    
 IR INSERT TUNL CVC W PORT OVER 5 YEARS    
 LAMINECTOMY,CERVICAL  1994  
 NEEDLE BIOPSY LIVER,W OTHR [address]  8.21.07  
 NV [address] AND 1994  NV TRABECULOPLASTY BY LASER SURGERY    
 US GUIDE ASP OVARIAN CYST ABD APPROACH  8.21.07  
 RIGHT Social History Socioeconomic History  Marital status:  Spouse name: Not on file  Number of children: Not on file  Years of education: Not on file  Highest education level: Not on file Social Needs  Financial resource strain: Not on file  Food insecurity - worry: Not on file  Food insecurity - inability: Not on file  Transportation needs - medical: Not on file  Transportation needs - non-medical: Not on file Occupational History  Not on file Tobacco Use  Smoking status: Never Smoker  Smokeless tobacco: Never Used Substance and Sexual Activity  Alcohol use: Yes Comment: rare  Drug use: No  
 Sexual activity: Not on file Other Topics Concern  Not on file Social History Narrative  Not on file Family History Problem Relation Age of Onset  Cancer Mother  Heart Disease Mother  Alcohol abuse Father  Diabetes Brother  Hypertension Brother Allergies Allergen Reactions  Ace Inhibitors Cough  Amlodipine Swelling  Avapro [Irbesartan] Unable to Obtain  Bactrim [Sulfamethoxazole-Trimethoprim] Other (comments) Sore mouth  Captopril Cough  Carvedilol Diarrhea  Contrast Agent [Iodine] Itching [address]  Morphine Nausea and Vomiting and Swelling  Penicillins Hives  Pravachol [Pravastatin] Nausea Only  Seafood [Shellfish Containing Products] Hives  Singulair [Montelukast] Other (comments)  
  palpitations Current Outpatient Medications Medication Sig Dispense Refill  LANTUS U-100 INSULIN 100 unit/mL injection INJECT 15 UNITS UNDER THE SKIN ONCE DAILY 10 mL 5  
 warfarin (COUMADIN) 4 mg tablet Take none today 9/13, then a half tablet on 9/14 and 9/15; from then on take a half tablet on Mon-Wed-Fri and one tablet the other 4 days.  45 Tab 3  
 bumetanide (BUMEX) 1 mg tablet TAKE ONE TABLET BY MOUTH TWICE A DAY 60 Tab 4  
 hydrALAZINE (APRESOLINE) 100 mg tablet TAKE ONE TABLET BY MOUTH THREE TIMES A DAY 90 Tab 3  
 metoprolol succinate (TOPROL-XL) 100 mg tablet TAKE TWO TABLETS BY MOUTH DAILY 60 Tab 10  
 doxazosin (CARDURA) 4 mg tablet TAKE ONE TABLET BY MOUTH ONCE NIGHTLY 30 Tab 11  
 sertraline (ZOLOFT) 50 mg tablet TAKE ONE AND ONE-HALF (1 & 1/2) TABLET BY MOUTH DAILY 45 Tab 5  
 busPIRone (BUSPAR) 10 mg tablet Take one po twice daily 60 Tab 11  
 TRUEPLUS INSULIN 0.5 mL 31 gauge x 5/16 syrg  potassium chloride SR (KLOR-CON 10) 10 mEq tablet TAKE ONE TABLET BY MOUTH DAILY 30 Tab 11  
 cloNIDine HCl (CATAPRES) 0.3 mg tablet TAKE ONE TABLET BY MOUTH TWICE A DAY 60 Tab 11  
 metolazone (ZAROXOLYN) 5 mg tablet Take 1 Tab by mouth daily as needed (take if develop increased LE edema, weight gain > 5 pounds. ). 30 Tab 1  cholecalciferol, VITAMIN D3, (VITAMIN D3) 5,000 unit tab tablet Take 5,000 Units by mouth daily.  vitamin A 8,000 unit capsule Take 8,000 Units by mouth daily.  ACETAMINOPHEN/DIPHENHYDRAMINE (TYLENOL PM PO) Take 2 Tabs by mouth nightly as needed.  insulin lispro (HUMALOG) 100 unit/mL kwikpen 2 units with dinner for sugars over 200 1 Package 0  
 cyanocobalamin (VITAMIN B-12) 1,000 mcg sublingual tablet Take 1,000 mcg by mouth daily.  calcium carbonate (TUMS) 200 mg calcium (500 mg) Chew Take 1 Tab by mouth three (3) times daily (after meals). Review of Systems Constitutional: Negative for malaise/fatigue and weight loss. Gastrointestinal: Negative for constipation and diarrhea. Musculoskeletal: Positive for joint pain (knees). Negative for back pain. Neurological: Negative for tingling and focal weakness. Visit Vitals /64 (BP 1 Location: Left arm, BP Patient Position: Sitting) Pulse 84 Temp 97.9 °F (36.6 °C) (Oral) Resp 14 Ht 5' 9\" (1.753 m) Wt 279 lb (126.6 kg) SpO2 95% BMI 41.20 kg/m² Physical Exam  
Constitutional: She is oriented to person, place, and time. She appears well-developed and well-nourished. HENT:  
Head: Normocephalic and atraumatic. Eyes: Conjunctivae are normal. Pupils are equal, round, and reactive to light. Neck: Neck supple. Carotid bruit is not present. No thyromegaly present. Cardiovascular: Normal rate, regular rhythm and normal heart sounds. PMI is not displaced. Exam reveals no gallop. No murmur heard. Pulses: 
     Dorsalis pedis pulses are 2+ on the right side, and 2+ on the left side. Posterior tibial pulses are 2+ on the right side, and 2+ on the left side. Pulmonary/Chest: Effort normal. She has no wheezes. She has no rhonchi. She has no rales. Abdominal: Soft. Normal appearance. She exhibits no abdominal bruit and no mass. There is no hepatosplenomegaly. There is no tenderness. Musculoskeletal: She exhibits no edema. Lymphadenopathy:  
  She has no cervical adenopathy. Right: No supraclavicular adenopathy present. Left: No supraclavicular adenopathy present. Neurological: She is alert and oriented to person, place, and time. No sensory deficit. Skin: Skin is warm, dry and intact. Lesion noted. No rash noted. Psychiatric: She has a normal mood and affect. Her behavior is normal.  
Nursing note and vitals reviewed. Diabetic foot exam:  
 
Left Foot: 
 Visual Exam: normal  
 Pulse DP: 1+ (weak) Filament test: absent sensation Vibratory sensation: absent Right Foot: 
 Visual Exam: short 2nd toe Pulse DP: 1+ (weak) Filament test: absent sensation Vibratory sensation: absent Results for orders placed or performed in visit on 11/01/18 PROTHROMBIN TIME + INR Result Value Ref Range INR 2.7 (H) 0.8 - 1.2 Prothrombin time 26.4 (H) 9.1 - 12.0 sec Lab Results Component Value Date/Time Hemoglobin A1c 8.4 (H) 11/01/2018 12:00 AM  
 Hemoglobin A1c (POC) 7.4 (A) 05/22/2017 03:49 PM  
 
Lab Results Component Value Date/Time  Sodium 139 11/01/2018 12:00 AM  
 Potassium 4.8 11/01/2018 12:00 AM  
 Chloride 102 11/01/2018 12:00 AM  
 CO2 21 11/01/2018 12:00 AM  
 Anion gap 10 05/21/2016 04:38 PM  
 Glucose 132 (H) 11/01/2018 12:00 AM  
 BUN 32 (H) 11/01/2018 12:00 AM  
 Creatinine 1.91 (H) 11/01/2018 12:00 AM  
 BUN/Creatinine ratio 17 11/01/2018 12:00 AM  
 GFR est AA 33 (L) 11/01/2018 12:00 AM  
 GFR est non-AA 28 (L) 11/01/2018 12:00 AM  
 Calcium 9.2 11/01/2018 12:00 AM  
 
Lab Results Component Value Date/Time Cholesterol, total 158 08/16/2017 08:16 AM  
 HDL Cholesterol 29 (L) 08/16/2017 08:16 AM  
 LDL, calculated 85 08/16/2017 08:16 AM  
 VLDL, calculated 44 (H) 08/16/2017 08:16 AM  
 Triglyceride 221 (H) 08/16/2017 08:16 AM  
 
 
 
ASSESSMENT and PLAN 
  ICD-10-CM ICD-9-CM 1. Uncontrolled type 2 diabetes mellitus with stage 4 chronic kidney disease, with long-term current use of insulin ([facility]) E11.22 250.52 MICROALBUMIN, UR, RAND W/ MICROALB/CREAT RATIO  
 E11.65 585.4 HEMOGLOBIN A1C WITH EAG  
 N18.4 V58.67 HM DIABETES FOOT EXAM  
 Z79.4  HEMOGLOBIN A1C WITH EAG  
   MICROALBUMIN, UR, RAND W/ MICROALB/CREAT RATIO  
   METABOLIC PANEL, BASIC 2. Essential hypertension I10 401.9 3. Mixed hyperlipidemia E78.2 272.2 LIPID PANEL  
   METABOLIC PANEL, COMPREHENSIVE  
   METABOLIC PANEL, COMPREHENSIVE  
   LIPID PANEL 4. PAF (paroxysmal atrial fibrillation) ([facility]) I48.0 427.31   
5. Morbid obesity with BMI of 40.0-44.9, adult ([facility] E66.01 278.01   
 Z68.41 V85.41   
6. Anemia in stage 4 chronic kidney disease ([facility]) N18.4 285.21   
 D63.1 585.4 7. Long term (current) use of anticoagulants Z79.01 V58.61   
8. S/P cardiac pacemaker procedure Z95.0 V45.01   
9. Open wound of right lower leg, initial encounter S81.801A 891.0 REFERRAL TO WOUND CARE Diagnoses and all orders for this visit: 
 
1. Uncontrolled type 2 diabetes mellitus with stage 4 chronic kidney disease, with long-term current use of insulin ([facility] Diabetes Mellitus: poorly controlled.  
Issues reviewed with her: low cholesterol diet, weight control and daily exercise discussed, annual eye examinations at Ophthalmology discussed, glycohemoglobin and other lab monitoring discussed and long term diabetic complications discussed. -     MICROALBUMIN, UR, RAND W/ MICROALB/CREAT RATIO; Future 
-     HEMOGLOBIN A1C WITH EAG; Future 
-      DIABETES FOOT EXAM 
-     METABOLIC PANEL, BASIC; Future 2. Essential hypertension Hypertension is controlled 3. Mixed hyperlipidemia Hyperlipidemia is controlled -     LIPID PANEL; Future -     METABOLIC PANEL, COMPREHENSIVE; Future 4. PAF (paroxysmal atrial fibrillation) ([facility] Stable with anticoagulation. 5. Morbid obesity with BMI of 40.0-44.9, adult ([facility] Discussed using smaller plate for portion control, keeping a food diary for awareness of food consumed, checking weight often, and increasing physical activity. Will re-evaluate next visit. 6. Anemia in stage 4 chronic kidney disease ([facility] 7. Long term (current) use of anticoagulants 8. S/P cardiac pacemaker procedure 9. Open wound of right lower leg, initial encounter Stop use of Neosporin. I think surrounding skin reaction is reaction to it.  
-     REFERRAL TO WOUND CARE Follow-up Disposition: 
Return in about 4 years (around 11/12/2022) for DM, HTN, chol  NON-fasting lab one week prior  . lab results and schedule of future lab studies reviewed with patient 
reviewed diet, exercise and weight control 
cardiovascular risk and specific lipid/LDL goals reviewed I have discussed the diagnosis, evaluation and treatment options and the intended plan with the patient. Patient understands and is in agreement. The patient has received an after-visit summary and questions were answered concerning future plans. I have discussed side effects and warnings of any new medications with the patient as well.

## 2018-11-12 NOTE — PATIENT INSTRUCTIONS
Make appointment with eye doctor for dilated retinal exam and have a copy of report sent to us. (Fax number is [phone].) Make appointment at wound care center for wound on right leg Stop using neosporin Office visit in 4 months with non-fasting lab work a week before visit. Learning About Diabetes Food Guidelines Your Care Instructions Meal planning is important to manage diabetes. It helps keep your blood sugar at a target level (which you set with your doctor). You don't have to eat special foods. You can eat what your family eats, including sweets once in a while. But you do have to pay attention to how often you eat and how much you eat of certain foods. You may want to work with a dietitian or a certified diabetes educator (CDE) to help you plan meals and snacks. A dietitian or CDE can also help you lose weight if that is one of your goals. What should you know about eating carbs? Managing the amount of carbohydrate (carbs) you eat is an important part of healthy meals when you have diabetes. Carbohydrate is found in many foods. · Learn which foods have carbs. And learn the amounts of carbs in different foods. ? Bread, cereal, pasta, and rice have about 15 grams of carbs in a serving. A serving is 1 slice of bread (1 ounce), ½ cup of cooked cereal, or 1/3 cup of cooked pasta or rice. ? Fruits have 15 grams of carbs in a serving. A serving is 1 small fresh fruit, such as an apple or orange; ½ of a banana; ½ cup of cooked or canned fruit; ½ cup of fruit juice; 1 cup of melon or raspberries; or 2 tablespoons of dried fruit. ? Milk and no-sugar-added yogurt have 15 grams of carbs in a serving. A serving is 1 cup of milk or 2/3 cup of no-sugar-added yogurt. ? Starchy vegetables have 15 grams of carbs in a serving. A serving is ½ cup of mashed potatoes or sweet potato; 1 cup winter squash; ½ of a small baked potato; ½ cup of cooked beans; or ½ cup cooked corn or green peas. · Learn how much carbs to eat each day and at each meal. A dietitian or CDE can teach you how to keep track of the amount of carbs you eat. This is called carbohydrate counting. · If you are not sure how to count carbohydrate grams, use the Plate Method to plan meals. It is a good, quick way to make sure that you have a balanced meal. It also helps you spread carbs throughout the day. ? Divide your plate by types of foods. Put non-starchy vegetables on half the plate, meat or other protein food on one-quarter of the plate, and a grain or starchy vegetable in the final quarter of the plate. To this you can add a small piece of fruit and 1 cup of milk or yogurt, depending on how many carbs you are supposed to eat at a meal. 
· Try to eat about the same amount of carbs at each meal. Do not \"save up\" your daily allowance of carbs to eat at one meal. 
· Proteins have very little or no carbs per serving. Examples of proteins are beef, chicken, turkey, fish, eggs, tofu, cheese, cottage cheese, and peanut butter. A serving size of meat is 3 ounces, which is about the size of a deck of cards. Examples of meat substitute serving sizes (equal to 1 ounce of meat) are 1/4 cup of cottage cheese, 1 egg, 1 tablespoon of peanut butter, and ½ cup of tofu. How can you eat out and still eat healthy? · Learn to estimate the serving sizes of foods that have carbohydrate. If you measure food at home, it will be easier to estimate the amount in a serving of restaurant food. · If the meal you order has too much carbohydrate (such as potatoes, corn, or baked beans), ask to have a low-carbohydrate food instead. Ask for a salad or green vegetables. · If you use insulin, check your blood sugar before and after eating out to help you plan how much to eat in the future. · If you eat more carbohydrate at a meal than you had planned, take a walk or do other exercise. This will help lower your blood sugar. What else should you know? · Limit saturated fat, such as the fat from meat and dairy products. This is a healthy choice because people who have diabetes are at higher risk of heart disease. So choose lean cuts of meat and nonfat or low-fat dairy products. Use olive or canola oil instead of butter or shortening when cooking. · Don't skip meals. Your blood sugar may drop too low if you skip meals and take insulin or certain medicines for diabetes. · Check with your doctor before you drink alcohol. Alcohol can cause your blood sugar to drop too low. Alcohol can also cause a bad reaction if you take certain diabetes medicines. Follow-up care is a key part of your treatment and safety. Be sure to make and go to all appointments, and call your doctor if you are having problems. It's also a good idea to know your test results and keep a list of the medicines you take. Where can you learn more? Go to http://dona-windy.info/. Enter A308 in the search box to learn more about \"Learning About Diabetes Food Guidelines. \" Current as of: December 7, 2017 Content Version: 11.8 © 6468-6750 [employer]. Care instructions adapted under license by [employer] (which disclaims liability or warranty for this information). If you have questions about a medical condition or this instruction, always ask your healthcare professional. [address] any warranty or liability for your use of this information.

## 2018-11-13 PROBLEM — E11.42 DIABETIC POLYNEUROPATHY ASSOCIATED WITH TYPE 2 DIABETES MELLITUS (HCC): Status: ACTIVE | Noted: 2018-11-13

## 2018-11-13 LAB
ALBUMIN SERPL-MCNC: 4 G/DL (ref 3.5–5.5)
ALBUMIN/GLOB SERPL: 1.3 {RATIO} (ref 1.2–2.2)
ALP SERPL-CCNC: 112 IU/L (ref 39–117)
ALT SERPL-CCNC: 8 IU/L (ref 0–32)
AST SERPL-CCNC: 9 IU/L (ref 0–40)
BILIRUB SERPL-MCNC: 0.3 MG/DL (ref 0–1.2)
BUN SERPL-MCNC: 31 MG/DL (ref 6–24)
BUN/CREAT SERPL: 15 (ref 9–23)
CALCIUM SERPL-MCNC: 9.1 MG/DL (ref 8.7–10.2)
CHLORIDE SERPL-SCNC: 104 MMOL/L (ref 96–106)
CHOLEST SERPL-MCNC: 107 MG/DL (ref 100–199)
CO2 SERPL-SCNC: 18 MMOL/L (ref 20–29)
CREAT SERPL-MCNC: 2.04 MG/DL (ref 0.57–1)
EST. AVERAGE GLUCOSE BLD GHB EST-MCNC: 194 MG/DL
GLOBULIN SER CALC-MCNC: 3 G/DL (ref 1.5–4.5)
GLUCOSE SERPL-MCNC: 174 MG/DL (ref 65–99)
HBA1C MFR BLD: 8.4 % (ref 4.8–5.6)
HDLC SERPL-MCNC: 27 MG/DL
LDLC SERPL CALC-MCNC: 54 MG/DL (ref 0–99)
POTASSIUM SERPL-SCNC: 4.7 MMOL/L (ref 3.5–5.2)
PROT SERPL-MCNC: 7 G/DL (ref 6–8.5)
SODIUM SERPL-SCNC: 138 MMOL/L (ref 134–144)
TRIGL SERPL-MCNC: 129 MG/DL (ref 0–149)
VLDLC SERPL CALC-MCNC: 26 MG/DL (ref 5–40)

## 2018-11-13 RX ORDER — INSULIN GLARGINE 100 [IU]/ML
20 INJECTION, SOLUTION SUBCUTANEOUS DAILY
Qty: 10 ML | Refills: 5
Start: 2018-11-13 | End: 2019-11-10 | Stop reason: SDUPTHER

## 2018-11-13 NOTE — PROGRESS NOTES
Hemoglobin A1C is about the same and still in poorly controlled range. Please increase Lantus insulin to 20 units daily Creatinine is now over 2.0. However as this was a fasting sample, it may be lower if you are not fasting. We are very close to needing nephrologist in the picture. The low CO2 is probably due to kidney problems as well. I will add creatinine to lab work when you get next Protime/INR (11/29). Please remind phlebotomist that you have 2 tests; she may not see both as they were ordered on 2 different days. Cholesterol is at goal.  
Patient informed in My Chart.

## 2018-11-14 ENCOUNTER — HOSPITAL ENCOUNTER (OUTPATIENT)
Dept: WOUND CARE | Age: 58
Discharge: HOME | End: 2018-11-14
Payer: COMMERCIAL

## 2018-11-14 PROBLEM — I83.029 VENOUS STASIS ULCER OF LEFT LOWER LEG WITH EDEMA OF LEFT LOWER LEG (HCC): Status: ACTIVE | Noted: 2018-11-14

## 2018-11-14 PROBLEM — L97.929 VENOUS STASIS ULCER OF LEFT LOWER LEG WITH EDEMA OF LEFT LOWER LEG (HCC): Status: ACTIVE | Noted: 2018-11-14

## 2018-11-14 PROBLEM — R60.9 VENOUS STASIS ULCER OF LEFT LOWER LEG WITH EDEMA OF LEFT LOWER LEG (HCC): Status: ACTIVE | Noted: 2018-11-14

## 2018-11-14 PROBLEM — I83.892 VENOUS STASIS ULCER OF LEFT LOWER LEG WITH EDEMA OF LEFT LOWER LEG (HCC): Status: ACTIVE | Noted: 2018-11-14

## 2018-11-14 PROBLEM — L97.919 VENOUS STASIS ULCER OF RIGHT LOWER LEG WITH EDEMA OF RIGHT LOWER LEG (HCC): Status: ACTIVE | Noted: 2018-11-14

## 2018-11-14 PROBLEM — I83.891 VENOUS STASIS ULCER OF RIGHT LOWER LEG WITH EDEMA OF RIGHT LOWER LEG (HCC): Status: ACTIVE | Noted: 2018-11-14

## 2018-11-14 PROBLEM — I83.019 VENOUS STASIS ULCER OF RIGHT LOWER LEG WITH EDEMA OF RIGHT LOWER LEG (HCC): Status: ACTIVE | Noted: 2018-11-14

## 2018-11-14 PROCEDURE — 99214 OFFICE O/P EST MOD 30 MIN: CPT

## 2018-11-14 PROCEDURE — 97597 DBRDMT OPN WND 1ST 20 CM/<: CPT

## 2018-11-14 NOTE — WOUND CARE
Right lower leg: A&D ointment, aquacell AG, gauze, roll gauze, secure with tape.  Double Tubi F bilateral legs,

## 2018-11-14 NOTE — H&P
Wound CenterHistory and Physical 
 
Date of Service: 11/14/2018 Subjective: Chief Complaint: 
Kunal Khan is a 60 y.o.  female who presents with R lower leg anterior wound of 1 months duration. Referred by:  Dr. Ana Rodriguez HISTORY OF PRESENT ILLNESS 
 
HPI She presents for evaluation of her right leg wound, with history of diabetes mellitus with retinopathy, hypertension, chronic kidney disease and secondary anemia, hyperlipidemia, obesity, paroxysmal atrial fibrillation and SSS with pacemaker. Blood pressures have been difficult to control. She stopped going to nephrologist due to cost      
  
Complainig of a wound on right lower leg. Her  is a hoarder. She hit leg on something Napoleon Rohit/statue or plastic tub). Initial injury was a month ago, but has hit it several times since. Painful, oozing clear fluid. She has kept bandaged and using Neosporin. Nonsmoker. Past Medical History:  
Diagnosis Date  Acquired lymphedema Right arm  Arrhythmia  Asthma  Atrial fibrillation ([address] Dr. Duane Amsterdam and Dr. Yajaira Carrillo [facility])  Cancer ([address] bilateral breast  
 Chronic kidney disease  Diabetes mellitus type II   
 Dizziness  Essential hypertension  Hyperlipidemia  Hypertension  Long term (current) use of anticoagulants  Morbid obesity ([address] 3/14/2012  Osteoarthritis  Pacemaker  Sick sinus syndrome ([address] Past Surgical History:  
Procedure Laterality Date  ABDOMEN SURGERY PROC UNLISTED    
 gastric bypass  GASTRIC BYPASS,OBESE<150CM SAW-EN-Y  7/08  
 kaitlincher  HX AFIB ABLATION    
 HX CARPAL TUNNEL RELEASE [address] RIGHT MODIFIED RADICAL   
 HX MOHS PROCEDURES  1995  
 right  HX ORTHOPAEDIC    
 ight knee surgery  HX PACEMAKER    
 IR INSERT TUNL CVC W PORT OVER 5 YEARS    
 Rosie Wolfe  NEEDLE BIOPSY LIVER,W OTHR PROC  8.21.07  
[address] AND 1994  PA TRABECULOPLASTY BY LASER SURGERY    
 US GUIDE ASP OVARIAN CYST ABD APPROACH  8.21.07  
 RIGHT Family History Problem Relation Age of Onset  Cancer Mother  Heart Disease Mother  Alcohol abuse Father  Diabetes Brother  Hypertension Brother Social History Tobacco Use  Smoking status: Never Smoker  Smokeless tobacco: Never Used Substance Use Topics  Alcohol use: Yes Comment: rare Prior to Admission medications Medication Sig Start Date End Date Taking? Authorizing Provider  
insulin glargine (LANTUS U-100 INSULIN) 100 unit/mL injection 20 Units by SubCUTAneous route daily. 11/13/18   Sabrina Gibbs MD  
warfarin (COUMADIN) 4 mg tablet Take none today 9/13, then a half tablet on 9/14 and 9/15; from then on take a half tablet on Mon-Wed-Fri and one tablet the other 4 days.  9/13/18   Sabrina Gibbs MD  
bumetanide (BUMEX) 1 mg tablet TAKE ONE TABLET BY MOUTH TWICE A DAY 9/4/18   Lolly Monge MD  
hydrALAZINE (APRESOLINE) 100 mg tablet TAKE ONE TABLET BY MOUTH THREE TIMES A DAY 7/24/18   Sabrina Gibbs MD  
metoprolol succinate (TOPROL-XL) 100 mg tablet TAKE TWO TABLETS BY MOUTH DAILY 7/23/18   Sabrina Gibbs MD  
doxazosin (CARDURA) 4 mg tablet TAKE ONE TABLET BY MOUTH ONCE NIGHTLY 6/11/18   Sabrina Gibbs MD  
sertraline (ZOLOFT) 50 mg tablet TAKE ONE AND ONE-HALF (1 & 1/2) TABLET BY MOUTH DAILY 5/29/18   Sabrina Gibbs MD  
busPIRone (BUSPAR) 10 mg tablet Take one po twice daily 5/15/18   Irma Barroso NP  
TRUEPLUS INSULIN 0.5 mL 31 gauge x 5/16 syrg  2/14/18   Provider, Historical  
potassium chloride SR (KLOR-CON 10) 10 mEq tablet TAKE ONE TABLET BY MOUTH DAILY 3/7/18   Lolly Monge MD  
cloNIDine HCl (CATAPRES) 0.3 mg tablet TAKE ONE TABLET BY MOUTH TWICE A DAY 2/26/18   Sabrina Gibbs MD  
 metolazone (ZAROXOLYN) 5 mg tablet Take 1 Tab by mouth daily as needed (take if develop increased LE edema, weight gain > 5 pounds. ). 4/10/14   Demetrio Mendenhall NP  
cholecalciferol, VITAMIN D3, (VITAMIN D3) 5,000 unit tab tablet Take 5,000 Units by mouth daily. Provider, Historical  
vitamin A 8,000 unit capsule Take 8,000 Units by mouth daily. Provider, Historical  
ACETAMINOPHEN/DIPHENHYDRAMINE (TYLENOL PM PO) Take 2 Tabs by mouth nightly as needed. Provider, Historical  
insulin lispro (HUMALOG) 100 unit/mL kwikpen 2 units with dinner for sugars over 200 1/3/14   Luis Askew MD  
cyanocobalamin (VITAMIN B-12) 1,000 mcg sublingual tablet Take 1,000 mcg by mouth daily. Provider, Historical  
calcium carbonate (TUMS) 200 mg calcium (500 mg) Chew Take 1 Tab by mouth three (3) times daily (after meals). Provider, Historical  
 
Allergies Allergen Reactions  Ace Inhibitors Cough  Amlodipine Swelling  Avapro [Irbesartan] Unable to Obtain  Bactrim [Sulfamethoxazole-Trimethoprim] Other (comments) Sore mouth  Captopril Cough  Carvedilol Diarrhea  Contrast Agent [Iodine] Itching [address]  Morphine Nausea and Vomiting and Swelling  Penicillins Hives  Pravachol [Pravastatin] Nausea Only  Seafood [Shellfish Containing Products] Hives  Singulair [Montelukast] Other (comments)  
  palpitations Review of Systems: A comprehensive review of systems was negative except for that written in the History of Present Illness. and PMH. Objective:  
 
Physical Exam:  
VSS, Afebrile General: well developed, well nourished, pleasant , NAD. Hygiene good Psych: cooperative. Pleasant. No anxiety or depression. Normal mood and affect. Neuro: alert and oriented to person/place/situation. Otherwise nonfocal. 
HEENT: Normocephalic, atraumatic. EOMI. Conjunctiva clear. No scleral icterus. Chest: Respirations nonlabored. Abdomen:  Nondistended. Lower extremities: color normal; temperature normal. Hair growth is not present. Calves are supple, nontender, approximately equally sized in comparison. Focused Lower Extremity Exam: ABIs not performed - patient stated it would be too painful. Ulcer Location: R lower leg anterior Etiology: combined Wound Length: 9 cm Wound Width :6.4 cm Wound Depth :0.1 Ulcer bed: Fibrotic slough, thick. Periwound: Normal 
Exudate: Scant/small amount Serous exudate Depth of Wound: To Fat Layer Assessment:  
 
60 y.o. female with R lower leg anterior venous stasis ulcer, brought on by trauma about a month ago. Plan:  
Ulcer Debridement Ulcer Number 1; Right Calf To Fat Layer;  Venous Character of Ulcer: New Indication for Debridement: Slough, Exudate Pre debridement measurements:  
Wound Length: 9 cm Wound Width :6.4 cm Wound Depth :0.1 Risks of procedure were discussed with patient. Consent has been signed. Anesthetic: Lidocaine 4% topical cream  
 
Level of Debridement: Subctaneous Tissue Tissue and/or Material removed: Bet[place] Type of Tissue: Non- Viable Pre-debridement severity: Fat Layer Exposed Post debridement severity: Fat Layer exposed Instrument(s) used: Curette Bleeding controlled with: Pressure Estimated blood loss: none Post debridement measurements:  
Wound Length: 9 cm Wound Width :6.4 cm Wound Depth :0.1 Post procedural pain: moderate Patient tolerated procedure well. Dressing: Aquacell Ag, Exudry, bilateral double tubigrips until ABIs can be done. Frequency: daily Arterial and venous studies ordered. Patient understood and agrees with plan. Questions answered. Weekly visits and serial debridements also discussed. Follow up with me in 1 week Signed By: Dillon Schwab MD   
 November 14, 2018

## 2018-11-14 NOTE — WOUND CARE
11/14/18 8754 Wound Leg Lower Right;Lateral  
Date First Assessed/Time First Assessed: 11/14/18 0830   POA: Yes  Wound Type: Pressure injury  Location: Leg Lower  Orientation: Right;Lateral  
DRESSING STATUS Clean, dry, and intact DRESSING TYPE Gauze;Gauze wrap (arnie) Wound Length (cm) 9 cm Wound Width (cm) 6.4 cm Wound Depth (cm) 0.1 Wound Surface area (cm^2) 57.6 cm^2 Condition of Base Granulation;Slough Drainage Amount  Moderate Drainage Color Serosanguinous Wound Odor None Periwound Skin Condition Erythema, non-blanchable;Excoriated There were no vitals taken for this visit.

## 2018-11-16 DIAGNOSIS — F41.1 ANXIETY AS ACUTE REACTION TO GROSS STRESS: ICD-10-CM

## 2018-11-16 DIAGNOSIS — F43.0 ANXIETY AS ACUTE REACTION TO GROSS STRESS: ICD-10-CM

## 2018-11-16 RX ORDER — SERTRALINE HYDROCHLORIDE 50 MG/1
TABLET, FILM COATED ORAL
Qty: 45 TAB | Refills: 4 | Status: SHIPPED | OUTPATIENT
Start: 2018-11-16 | End: 2019-04-17 | Stop reason: SDUPTHER

## 2018-11-19 ENCOUNTER — HOSPITAL ENCOUNTER (OUTPATIENT)
Dept: WOUND CARE | Age: 58
Discharge: HOME | End: 2018-11-19
Payer: COMMERCIAL

## 2018-11-19 PROCEDURE — 97597 DBRDMT OPN WND 1ST 20 CM/<: CPT

## 2018-11-19 NOTE — WOUND CARE
11/19/18 1631 Wound Leg Lower Right;Lateral  
Date First Assessed/Time First Assessed: 11/14/18 0830   POA: Yes  Wound Type: Pressure injury  Location: Leg Lower  Orientation: Right;Lateral  
Dressing Type Applied Alginate;Gauze;Gauze wrap (arnie); Silver products; Special tape (comment) Wound Procedure Type Selective Debridement Procedure Time Out 7501 Consent Obtained  Yes Procedure Instrument Curette # 7 Procedure Bleeding Yes Hemostasis pressure Procedure Pain Scale Numeric 0/10 Post Procedure Procedure Pain Scale Numeric 0/10 Procedure Tolerated Well Patient was discharged with Self to home and was ambulatory. In stable condition with c/o pain:_0_/10_

## 2018-11-19 NOTE — WOUND CARE
11/19/18 1551 Wound Leg Lower Right;Lateral  
Date First Assessed/Time First Assessed: 11/14/18 0830   POA: Yes  Wound Type: Pressure injury  Location: Leg Lower  Orientation: Right;Lateral  
DRESSING STATUS Breakthrough drainage DRESSING TYPE Gauze;Gauze wrap (arnie) Wound Length (cm) 8.7 cm Wound Width (cm) 6.4 cm Wound Depth (cm) 0.1 Wound Surface area (cm^2) 55.68 cm^2 Change in Wound Size % 3.33 Condition of Base Granulation;Slough Condition of Edges Open Tissue Type Pink;Red;Yellow Drainage Amount  Moderate Drainage Color Serosanguinous Wound Odor None Periwound Skin Condition Erythema, non-blanchable Cleansing and Cleansing Agents  Normal saline

## 2018-11-19 NOTE — PROGRESS NOTES
Wound Center Progress Note 
  
Date of Service: 11/19/2018  
  
Subjective:  
  
Chief Complaint: 
Kunal Khan is a 60 y.o.  female who presents with R lower leg anterior wound of 1 months duration. 
  
Referred by:  Dr. Kelly Tapia 
  
HPI She presents for evaluation of her right leg wound, with history of diabetes mellitus with retinopathy, hypertension, chronic kidney disease and secondary anemia, hyperlipidemia, obesity, paroxysmal atrial fibrillation and SSS with pacemaker. Blood pressures have been difficult to control. She stopped going to nephrologist due to cost      
  
Complaining of a wound on right lower leg. Her  is a hoarder. She hit leg on something Napoleon Rohit/statue or plastic tub). Initial injury was a month ago, but has hit it several times since. Painful, oozing clear fluid. She states that 2 tubigrips made her toes turn purple, so she used only one. She has had her vascular studies, and they were normal. 
  
Nonsmoker.  
  
    
Past Medical History:  
Diagnosis Date  Acquired lymphedema    
  Right arm  Arrhythmia    
 Asthma    
 Atrial fibrillation [facility])    
  Dr. Duane Amsterdam and Dr. Quenten Blush Atrial flutter [facility])    
 Cancer [facility])    
  bilateral breast  
 Chronic kidney disease    
 Diabetes mellitus type II    
 Dizziness    
 Essential hypertension    
 Hyperlipidemia    
 Hypertension    
 Long term (current) use of anticoagulants    
 Morbid obesity ([address] 3/14/2012  Osteoarthritis    
 Pacemaker    
 Sick sinus syndrome [facility])    
  
     
Past Surgical History:  
Procedure Laterality Date  ABDOMEN SURGERY PROC UNLISTED      
  gastric bypass  GASTRIC BYPASS,OBESE<150CM SAW-EN-Y   7/08  
  guanakito FREED AFIB ABLATION      
 HX CARPAL TUNNEL RELEASE      
 HX CERVICAL FUSION   1994 [address]    [address]  
  RIGHT MODIFIED RADICAL   
 HX MOHS PROCEDURES   1995   right  HX ORTHOPAEDIC      
  ight knee surgery  HX PACEMAKER      
 IR INSERT TUNL CVC W PORT OVER 5 YEARS      
 LAMINECTOMY,CERVICAL   1994  
 NEEDLE BIOPSY LIVER,W OTHR [address]   8.21.07  
 [place] TRABECULOPLASTY BY LASER SURGERY      
 US GUIDE ASP OVARIAN CYST ABD APPROACH   8.21.07  
  RIGHT Allergies Allergen Reactions  Ace Inhibitors Cough  Amlodipine Swelling  Avapro [Irbesartan] Unable to Obtain  Bactrim [Sulfamethoxazole-Trimethoprim] Other (comments)  
    Sore mouth  Captopril Cough  Carvedilol Diarrhea  Contrast Agent [Iodine] Itching [address]  Morphine Nausea and Vomiting and Swelling  Penicillins Hives  Pravachol [Pravastatin] Nausea Only  Seafood [Shellfish Containing Products] Hives  Singulair [Montelukast] Other (comments)  
    palpitations  
  
  
Review of Systems: A comprehensive review of systems was negative except for that written in the History of Present Illness. and PMH. 
  
Objective:  
  
Physical Exam:  
VSS, Afebrile General: well developed, well nourished, pleasant , NAD. Hygiene good Psych: cooperative. Pleasant. No anxiety or depression. Normal mood and affect. Neuro: alert and oriented to person/place/situation. Otherwise nonfocal. 
HEENT: Normocephalic, atraumatic. EOMI. Conjunctiva clear. No scleral icterus. Chest: Respirations nonlabored. Abdomen:  Nondistended. 
  
Lower extremities: color normal; temperature normal. Hair growth is not present. Calves are supple, nontender, approximately equally sized in comparison.  
  
Focused Lower Extremity Exam: 
  
ABIs not performed - patient stated it would be too painful. 
  
Ulcer Location: R lower leg anterior Etiology: combined Wound Length:  8.7 cm Wound Width :6.4 cm Wound Depth : 0.1 cm Ulcer bed: Fibrotic slough, thick. Periwound: Normal 
Exudate: Scant/small amount Serous exudate Depth of Wound: To Fat Layer  
  
  
Assessment:  
  
60 y.o. female with R lower leg anterior venous stasis ulcer, brought on by trauma about a month ago. 
  
  
Plan:  
Ulcer Debridement 
  
Ulcer Number 1; Right Calf To Fat Layer;  Venous   
  
Character of Ulcer: New  
  Indication for Debridement: Slough, Exudate 
  
Pre debridement measurements: See above 
  
Risks of procedure were discussed with patient. Consent has been signed.  
  
Anesthetic: Lidocaine 4% topical cream  
  
Level of Debridement: Subctaneous Tissue  
  Tissue and/or Material removed: Fibrin/ Slough 
  
Pre-debridement severity: Fat Layer Exposed  
  
Post debridement severity: Fat Layer exposed 
  
Instrument(s) used: Curette 
  
Bleeding controlled with: Pressure 
  
Estimated blood loss: none 
  
Post debridement measurements: Unchanged 
  
Post procedural pain: moderate 
  
Patient tolerated procedure well.  
  
  
Dressing: Aquacell Ag, Exudry, bilateral single tubigrips Frequency: daily 
  
Arterial and venous studies completed - they were both essentially normal.  She could be wrapped. 
  
Patient understood and agrees with plan. Questions answered. 
  
Weekly visits and serial debridements also discussed.  
Follow up with me in 1 week 
  
Signed By: Dwayne Acuna MD

## 2018-11-27 ENCOUNTER — HOSPITAL ENCOUNTER (OUTPATIENT)
Dept: WOUND CARE | Age: 58
Discharge: HOME | End: 2018-11-27
Payer: COMMERCIAL

## 2018-11-27 VITALS
TEMPERATURE: 97.9 F | DIASTOLIC BLOOD PRESSURE: 108 MMHG | RESPIRATION RATE: 18 BRPM | SYSTOLIC BLOOD PRESSURE: 211 MMHG | HEART RATE: 85 BPM

## 2018-11-27 PROCEDURE — 11042 DBRDMT SUBQ TIS 1ST 20SQCM/<: CPT

## 2018-11-27 NOTE — PROGRESS NOTES
Patient presents to wound clinic for: Belkys Al is a 60 y.o. female who presents initial wound care by myself of the following:  Right lower leg wound. Patient has a history of diabetes mellitus, HTN, CKD, HLD, obesity, afib, SSS. She has previously by evaluated by Dr. Lakeshia Moseley for this wound. She relates that she initially injured the  extremity a little over a month ago by bumping into something in her home (relates that  is a hoarder than that she is frequently bumping into things. Relates that the area is decreasing in size. Has only been using one tubigrip instead of two because she relates that it hurts otherwise. She relates that she has to stand at work. Denies any recent infection to the wound. Pertinent Medical History: 
Past Medical History:  
Diagnosis Date  Acquired lymphedema Right arm  Arrhythmia  Asthma  Atrial fibrillation ([facility] Dr. Benito Capellan and Dr. Vazquez [facility])  Cancer ([facility] bilateral breast  
 Chronic kidney disease  Diabetes mellitus type II   
 Dizziness  Essential hypertension  Hyperlipidemia  Hypertension  Long term (current) use of anticoagulants  Morbid obesity ([facility] 3/14/2012  Osteoarthritis  Pacemaker  Sick sinus syndrome ([facility] Past Surgical History:  
Procedure Laterality Date  ABDOMEN SURGERY PROC UNLISTED    
 gastric bypass  GASTRIC BYPASS,OBESE<150CM SAW-EN-Y  7/08  
 guanakito  HX AFIB ABLATION    
 HX CARPAL TUNNEL RELEASE [address] [place] [id number] [address] RIGHT MODIFIED RADICAL   
 HX MOHS PROCEDURES  1995  
 right  HX ORTHOPAEDIC    
 ight knee surgery  HX PACEMAKER    
 IR INSERT TUNL CVC W PORT OVER 5 YEARS    
 LAMINECTOMY,CERVICAL  1994  
 NEEDLE BIOPSY LIVER,W OTHR [address]  8.21.07  
 [address] AND 1994  AL TRABECULOPLASTY BY LASER SURGERY  US GUIDE ASP OVARIAN CYST ABD APPROACH  8.21.07  
 RIGHT Prior to Admission medications Medication Sig Start Date End Date Taking? Authorizing Provider  
sertraline (ZOLOFT) 50 mg tablet TAKE ONE AND ONE-HALF (1 & 1/2) TABLET BY MOUTH DAILY 11/16/18   Kelsi Guidry MD  
insulin glargine (LANTUS U-100 INSULIN) 100 unit/mL injection 20 Units by SubCUTAneous route daily. 11/13/18   Kesli Guidry MD  
warfarin (COUMADIN) 4 mg tablet Take none today 9/13, then a half tablet on 9/14 and 9/15; from then on take a half tablet on Mon-Wed-Fri and one tablet the other 4 days. 9/13/18   Kelsi Guidry MD  
bumetanide (BUMEX) 1 mg tablet TAKE ONE TABLET BY MOUTH TWICE A DAY 9/4/18   Haritha Hurtado MD  
hydrALAZINE (APRESOLINE) 100 mg tablet TAKE ONE TABLET BY MOUTH THREE TIMES A DAY 7/24/18   Kelsi Guidry MD  
metoprolol succinate (TOPROL-XL) 100 mg tablet TAKE TWO TABLETS BY MOUTH DAILY 7/23/18   Kelsi Guidry MD  
doxazosin (CARDURA) 4 mg tablet TAKE ONE TABLET BY MOUTH ONCE NIGHTLY 6/11/18   Kelsi Guidry MD  
busPIRone (BUSPAR) 10 mg tablet Take one po twice daily 5/15/18   Yin Dumas NP  
TRUEPLUS INSULIN 0.5 mL 31 gauge x 5/16 syrg  2/14/18   Provider, Historical  
potassium chloride SR (KLOR-CON 10) 10 mEq tablet TAKE ONE TABLET BY MOUTH DAILY 3/7/18   Haritha Hurtado MD  
cloNIDine HCl (CATAPRES) 0.3 mg tablet TAKE ONE TABLET BY MOUTH TWICE A DAY 2/26/18   Kelsi Guidry MD  
metolazone (ZAROXOLYN) 5 mg tablet Take 1 Tab by mouth daily as needed (take if develop increased LE edema, weight gain > 5 pounds. ). 4/10/14   Ирина Downing NP  
cholecalciferol, VITAMIN D3, (VITAMIN D3) 5,000 unit tab tablet Take 5,000 Units by mouth daily. Provider, Historical  
vitamin A 8,000 unit capsule Take 8,000 Units by mouth daily.     Provider, Historical  
ACETAMINOPHEN/DIPHENHYDRAMINE (TYLENOL PM PO) Take 2 Tabs by mouth nightly as needed. Provider, Historical  
insulin lispro (HUMALOG) 100 unit/mL kwikpen 2 units with dinner for sugars over 200 1/3/14   Gatito Hopkins MD  
cyanocobalamin (VITAMIN B-12) 1,000 mcg sublingual tablet Take 1,000 mcg by mouth daily. Provider, Historical  
calcium carbonate (TUMS) 200 mg calcium (500 mg) Chew Take 1 Tab by mouth three (3) times daily (after meals). Provider, Historical  
 
Allergies Allergen Reactions  Ace Inhibitors Cough  Amlodipine Swelling  Avapro [Irbesartan] Unable to Obtain  Bactrim [Sulfamethoxazole-Trimethoprim] Other (comments) Sore mouth  Captopril Cough  Carvedilol Diarrhea  Contrast Agent [Iodine] Itching [address]  Morphine Nausea and Vomiting and Swelling  Penicillins Hives  Pravachol [Pravastatin] Nausea Only  Seafood [Shellfish Containing Products] Hives  Singulair [Montelukast] Other (comments)  
  palpitations Family History Problem Relation Age of Onset  Cancer Mother  Heart Disease Mother  Alcohol abuse Father  Diabetes Brother  Hypertension Brother Social History Socioeconomic History  Marital status:  Spouse name: Not on file  Number of children: Not on file  Years of education: Not on file  Highest education level: Not on file Social Needs  Financial resource strain: Not on file  Food insecurity - worry: Not on file  Food insecurity - inability: Not on file  Transportation needs - medical: Not on file  Transportation needs - non-medical: Not on file Occupational History  Not on file Tobacco Use  Smoking status: Never Smoker  Smokeless tobacco: Never Used Substance and Sexual Activity  Alcohol use: Yes Comment: rare  Drug use: No  
 Sexual activity: Not on file Other Topics Concern  Not on file Social History Narrative  Not on file Vitals:  
 11/27/18 1333 BP: (!) 211/108 Pulse: 85 Resp: 18 Temp: 97.9 °F (36.6 °C) Review of Systems: 
 
Gen: No fever, chills, malaise, weight loss/gain. Heent: No headache, rhinorrhea, epistaxis, ear pain, hearing loss, sinus pain, neck pain/stiffness, sore throat. Heart: No chest pain, palpitations, CHOW, pnd, or orthopnea. Resp: No cough, hemoptysis, wheezing and shortness of breath. GI: No nausea, vomiting, diarrhea, constipation, melena or hematochezia. : No urinary obstruction, dysuria or hematuria. Derm: see below Musc/skeletal: no bone or joint complains. Vasc: No edema, cyanosis or claudication. Endo: No heat/cold intolerance, no polyuria,polydipsia or polyphagia. Neuro: No unilateral weakness, numbness, tingling. No seizures. Heme: No easy bruising or bleeding. Wound Description: Wound Type: Pressure/Traumatic Indication: Chronic >30days Status: [initial/improving/no change/deteriorating] Measurements: 
  Wound Length: 7.4 cm Wound Width :4.5 cm Wound Depth :0.1 Tissue Type:  
Epithelial: without necrosis Granulation:without necrosis Subcutaneous: without necrosis Slough: yes Eschar: NA 
 
Gangrene: NA 
 Drainage: decreased Debridement: required today to promote wound healingRight  Pressure Calf To Fat Layer Character of Ulcer: Improved Indication for Debridement: Slough, Exudate, Abnormal wound edge and Abnormal Wound base Pre debridement measurements: 7.4 cm x 4.5 cm x 0.1 cm Risks of procedure were discussed with patient. Consent has been signed. Anesthetic: Lidocaine 4% topical cream  
 
Level of Debridement:subcutaneous tissue Tissue and/or Material removed: Exudate, Fibrin/ Slough and Subcutaneous Type of Tissue: Viable Pre-debridement severity: Fat Layer Exposed Post debridement severity: Fat Layer exposed Instrument(s) used: Curette Bleeding controlled with: Pressure Estimated blood loss: Minimal 
 
 Pre debridement measurements: 7.5 cm x 4.6 cm x 0.2 cm Post procedural pain: mild Patient tolerated procedure well. Infection: no 
 
 
Edema: yes Status: improved Edema is being managed with: tubigrip Patient educated on the importance of edema control yes Lower Extremity Circulation Previous studies indicate no history of vascular disease Compression: yes Status: Continue Type: tubigrip F Patient Compliant: yes Notes: [free text] Neuropathy: NA Offloading: NA 
 
Nicotine/Tobacco Use: No 
 
 
Nutrition: 
Status obese Diabetic: type 2 Glucose Control: not controlled Recent Hgb A1C: 8.4 Assessment: 1. Venous leg ulcer of the RLE secondary to prior trauma 2. Diabetes mellitus with CKD Plan: 
-Debridement as noted above 
-Continue dressing daily with Datamyne, sterile gauze dressing.  
- Wear single layer tubigrip to both legs daily. -May consider unna boot in the future. 
-Patient in agreement with assessment and plan 
-F/u 1 week

## 2018-11-27 NOTE — WOUND CARE
11/27/18 1334 Wound Leg Lower Right;Lateral  
Date First Assessed/Time First Assessed: 11/14/18 0830   POA: Yes  Wound Type: Pressure injury  Location: Leg Lower  Orientation: Right;Lateral  
DRESSING STATUS Clean, dry, and intact DRESSING TYPE Aquacel;Gauze;Gauze wrap (arnie) Wound Length (cm) 7.4 cm Wound Width (cm) 4.5 cm Wound Depth (cm) 0.1 Wound Surface area (cm^2) 33.3 cm^2 Change in Wound Size % 42.19 Condition of Base Granulation;Slough Condition of Edges Open Tissue Type Red;Pink;Yellow Drainage Amount  Small Drainage Color Serosanguinous Wound Odor None Periwound Skin Condition Erythema, blanchable Cleansing and Cleansing Agents  Normal saline

## 2018-11-27 NOTE — WOUND CARE
11/27/18 1441 Wound Leg Lower Right;Lateral  
Date First Assessed/Time First Assessed: 11/14/18 0830   POA: Yes  Wound Type: Pressure injury  Location: Leg Lower  Orientation: Right;Lateral  
Dressing Type Applied Alginate;4 x 4;Silver products Cara MARTINEZ) Patient was discharged with Self to home and was ambulatory. In stable condition with c/o pain:__0/10_

## 2018-11-29 ENCOUNTER — LAB ONLY (OUTPATIENT)
Dept: INTERNAL MEDICINE CLINIC | Facility: CLINIC | Age: 58
End: 2018-11-29

## 2018-11-29 DIAGNOSIS — Z98.890 STATUS POST ABLATION OF ATRIAL FIBRILLATION: Primary | Chronic | ICD-10-CM

## 2018-11-29 DIAGNOSIS — Z86.79 STATUS POST ABLATION OF ATRIAL FIBRILLATION: Primary | Chronic | ICD-10-CM

## 2018-11-30 ENCOUNTER — TELEPHONE (OUTPATIENT)
Dept: INTERNAL MEDICINE CLINIC | Facility: CLINIC | Age: 58
End: 2018-11-30

## 2018-11-30 DIAGNOSIS — I48.0 PAF (PAROXYSMAL ATRIAL FIBRILLATION) (HCC): ICD-10-CM

## 2018-11-30 LAB
ALBUMIN/CREAT UR: 265 MG/G CREAT (ref 0–30)
BUN SERPL-MCNC: 27 MG/DL (ref 6–24)
BUN/CREAT SERPL: 14 (ref 9–23)
CALCIUM SERPL-MCNC: 8.5 MG/DL (ref 8.7–10.2)
CHLORIDE SERPL-SCNC: 105 MMOL/L (ref 96–106)
CO2 SERPL-SCNC: 20 MMOL/L (ref 20–29)
CREAT SERPL-MCNC: 1.87 MG/DL (ref 0.57–1)
CREAT UR-MCNC: 44 MG/DL
GLUCOSE SERPL-MCNC: 223 MG/DL (ref 65–99)
INR PPP: 5.3 (ref 0.8–1.2)
MICROALBUMIN UR-MCNC: 116.6 UG/ML
POTASSIUM SERPL-SCNC: 4.4 MMOL/L (ref 3.5–5.2)
PROTHROMBIN TIME: 50 SEC (ref 9.1–12)
SODIUM SERPL-SCNC: 138 MMOL/L (ref 134–144)

## 2018-11-30 RX ORDER — WARFARIN 4 MG/1
TABLET ORAL
Qty: 45 TAB | Refills: 3
Start: 2018-11-30 | End: 2019-01-11

## 2018-11-30 NOTE — PROGRESS NOTES
Inform patient INR is much too high. Has she changed diet or vitamins that contain Vit K? Confirm current dose of warfarin 4 mg tablets a half tablet on Mon-Wed-Fri and one tablet the other 4 days. . Change warfarin 4 mg tablets to none today (11/30) or tomorrow (12/01), then 1 tablet on Mondays and Fridays and a half tablet the other 5 days. Repeat INR in 10-14 days.  Message also sent in My Chart

## 2018-12-04 ENCOUNTER — HOSPITAL ENCOUNTER (OUTPATIENT)
Dept: WOUND CARE | Age: 58
Discharge: HOME | End: 2018-12-04
Payer: COMMERCIAL

## 2018-12-04 VITALS — RESPIRATION RATE: 16 BRPM | TEMPERATURE: 97.1 F | DIASTOLIC BLOOD PRESSURE: 70 MMHG | SYSTOLIC BLOOD PRESSURE: 142 MMHG

## 2018-12-04 PROCEDURE — 11045 DBRDMT SUBQ TISS EACH ADDL: CPT

## 2018-12-04 PROCEDURE — 11042 DBRDMT SUBQ TIS 1ST 20SQCM/<: CPT

## 2018-12-04 NOTE — WOUND CARE
12/04/18 1400 Wound Leg Lower Right;Lateral  
Date First Assessed/Time First Assessed: 11/14/18 0830   POA: Yes  Wound Type: Pressure injury  Location: Leg Lower  Orientation: Right;Lateral  
DRESSING STATUS Intact DRESSING TYPE Aquacel;Silver products;4 x 4;Gauze Non-Pressure Injury Full thickness (subcut/muscle) Wound Length (cm) 7.4 cm Wound Width (cm) 4.7 cm Wound Depth (cm) 0.1 Wound Surface area (cm^2) 34.78 cm^2 Change in Wound Size % 39.62 Condition of Base Slough;Granulation;Pink Condition of Edges Open Tissue Type Red;Yellow;Pink Drainage Amount  Moderate Drainage Color Serosanguinous Wound Odor None Periwound Skin Condition Edematous; Erythema, blanchable; Intact; Other (comment) (Hemosiderin staining.)

## 2018-12-04 NOTE — WOUND CARE
12/04/18 1428 Wound Leg Lower Right;Lateral  
Date First Assessed/Time First Assessed: 11/14/18 0830   POA: Yes  Wound Type: Pressure injury  Location: Leg Lower  Orientation: Right;Lateral  
Dressing Type Applied Silver products; Alginate;Gauze;Gauze wrap (arnie); Special tape (comment) ( singleTubi F) Wound Procedure Type Debridement- Surgical 
(Sq) Procedure Time Out 8388 Consent Obtained  Yes Procedure Anesthia  Topical lidocaine Procedure Instrument curette # 7 Procedure Bleeding Yes Hemostasis 4x4 Procedure Pain Scale Numeric 0/10 Post Procedure Procedure Pain Scale Numeric 2/10 Procedure Tolerated Fair Patient was discharged with Self to home and was ambulatory. In stable condition with c/o pain:_0_/10_

## 2018-12-06 NOTE — PROGRESS NOTES
Patient presents to wound clinic for: Matthewanjel Chen is a 60 y.o. female who presents for subsequent wound care by myself of the following:  Right lower leg wound. Patient has a history of diabetes mellitus, HTN, CKD, HLD, obesity, afib, SSS. She has previously by evaluated by Dr. Joan Lott for this wound. She relates that she initially injured the  extremity a little over a month ago by bumping into something in her home (relates that  is a hoarder than that she is frequently bumping into things. Relates that the area is decreasing in size. Has only been using one tubigrip instead of two because she relates that it hurts otherwise. She relates that she has to stand at work. Denies any recent infection to the wound. Pertinent Medical History: 
Past Medical History:  
Diagnosis Date  Acquired lymphedema Right arm  Arrhythmia  Asthma  Atrial fibrillation ([facility] [address] Dr. Mayra Kamara and Dr. Griselda Hubbard [facility])  Cancer ([facility] [address] bilateral breast  
 Chronic kidney disease  Diabetes mellitus type II   
 Dizziness  Essential hypertension  Hyperlipidemia  Hypertension  Long term (current) use of anticoagulants  Morbid obesity ([address] 3/14/2012  Osteoarthritis  Pacemaker  Sick sinus syndrome ([facility] [address] Past Surgical History:  
Procedure Laterality Date  ABDOMEN SURGERY PROC UNLISTED    
 gastric bypass  GASTRIC BYPASS,OBESE<150CM SAW-EN-Y  7/08  
 [place]  HX AFIB ABLATION    
 HX CARPAL TUNNEL RELEASE [address] RIGHT MODIFIED RADICAL   
 HX MOHS PROCEDURES  1995  
 right  HX ORTHOPAEDIC    
 ight knee surgery  HX PACEMAKER    
 IR INSERT TUNL CVC W PORT OVER 5 YEARS    
 LAMINECTOMY,CERVICAL  1994  
 NEEDLE BIOPSY LIVER,W OTHR [address]  8.21.07  
 [place] [address] AND 1994  [place] TRABECULOPLAST[place] BY LASER SURGERY  US GUIDE ASP OVARIAN CYST ABD APPROACH  8.21.07  
 RIGHT Prior to Admission medications Medication Sig Start Date End Date Taking? Authorizing Provider  
warfarin (COUMADIN) 4 mg tablet Take none today (11/30) or tomorrow (12/01), then 1 tablet on Mondays and Fridays and a half tablet the other 5 days. 11/30/18   Zac Craft MD  
sertraline (ZOLOFT) 50 mg tablet TAKE ONE AND ONE-HALF (1 & 1/2) TABLET BY MOUTH DAILY 11/16/18   Zac Craft MD  
insulin glargine (LANTUS U-100 INSULIN) 100 unit/mL injection 20 Units by SubCUTAneous route daily. 11/13/18   Zac Craft MD  
bumetanide (BUMEX) 1 mg tablet TAKE ONE TABLET BY MOUTH TWICE A DAY 9/4/18   Danna Aguirre MD  
hydrALAZINE (APRESOLINE) 100 mg tablet TAKE ONE TABLET BY MOUTH THREE TIMES A DAY 7/24/18   Zac Craft MD  
metoprolol succinate (TOPROL-XL) 100 mg tablet TAKE TWO TABLETS BY MOUTH DAILY 7/23/18   Zac Craft MD  
doxazosin (CARDURA) 4 mg tablet TAKE ONE TABLET BY MOUTH ONCE NIGHTLY 6/11/18   Zac Craft MD  
busPIRone (BUSPAR) 10 mg tablet Take one po twice daily 5/15/18   Elvira Cortez NP  
TRUEPLUS INSULIN 0.5 mL 31 gauge x 5/16 syrg  2/14/18   Provider, Historical  
potassium chloride SR (KLOR-CON 10) 10 mEq tablet TAKE ONE TABLET BY MOUTH DAILY 3/7/18   Danna Aguirre MD  
cloNIDine HCl (CATAPRES) 0.3 mg tablet TAKE ONE TABLET BY MOUTH TWICE A DAY 2/26/18   Zac Craft MD  
metolazone (ZAROXOLYN) 5 mg tablet Take 1 Tab by mouth daily as needed (take if develop increased LE edema, weight gain > 5 pounds. ). 4/10/14   Galina Wiseman NP  
cholecalciferol, VITAMIN D3, (VITAMIN D3) 5,000 unit tab tablet Take 5,000 Units by mouth daily. Provider, Historical  
vitamin A 8,000 unit capsule Take 8,000 Units by mouth daily. Provider, Historical  
ACETAMINOPHEN/DIPHENHYDRAMINE (TYLENOL PM PO) Take 2 Tabs by mouth nightly as needed.     Provider, Historical  
 insulin lispro (HUMALOG) 100 unit/mL kwikpen 2 units with dinner for sugars over 200 1/3/14   Blake Cameron MD  
cyanocobalamin (VITAMIN B-12) 1,000 mcg sublingual tablet Take 1,000 mcg by mouth daily. Provider, Historical  
calcium carbonate (TUMS) 200 mg calcium (500 mg) Chew Take 1 Tab by mouth three (3) times daily (after meals). Provider, Historical  
 
Allergies Allergen Reactions  Ace Inhibitors Cough  Amlodipine Swelling  Avapro [Irbesartan] Unable to Obtain  Bactrim [Sulfamethoxazole-Trimethoprim] Other (comments) Sore mouth  Captopril Cough  Carvedilol Diarrhea  Contrast Agent [Iodine] Itching [address]  Morphine Nausea and Vomiting and Swelling  Penicillins Hives  Pravachol [Pravastatin] Nausea Only  Seafood [Shellfish Containing Products] Hives  Singulair [Montelukast] Other (comments)  
  palpitations Family History Problem Relation Age of Onset  Cancer Mother  Heart Disease Mother  Alcohol abuse Father  Diabetes Brother  Hypertension Brother Social History Socioeconomic History  Marital status:  Spouse name: Not on file  Number of children: Not on file  Years of education: Not on file  Highest education level: Not on file Social Needs  Financial resource strain: Not on file  Food insecurity - worry: Not on file  Food insecurity - inability: Not on file  Transportation needs - medical: Not on file  Transportation needs - non-medical: Not on file Occupational History  Not on file Tobacco Use  Smoking status: Never Smoker  Smokeless tobacco: Never Used Substance and Sexual Activity  Alcohol use: Yes Comment: rare  Drug use: No  
 Sexual activity: Not on file Other Topics Concern  Not on file Social History Narrative  Not on file Vitals:  
 12/04/18 1335 BP: 142/70 Resp: 16 Temp: 97.1 °F (36.2 °C) Review of Systems: 
 
Gen: No fever, chills, malaise, weight loss/gain. Heent: No headache, rhinorrhea, epistaxis, ear pain, hearing loss, sinus pain, neck pain/stiffness, sore throat. Heart: No chest pain, palpitations, CHOW, pnd, or orthopnea. Resp: No cough, hemoptysis, wheezing and shortness of breath. GI: No nausea, vomiting, diarrhea, constipation, melena or hematochezia. : No urinary obstruction, dysuria or hematuria. Derm: see below Musc/skeletal: no bone or joint complains. Vasc: No edema, cyanosis or claudication. Endo: No heat/cold intolerance, no polyuria,polydipsia or polyphagia. Neuro: No unilateral weakness, numbness, tingling. No seizures. Heme: No easy bruising or bleeding. Wound Description: Wound Type: Pressure/Traumatic Indication: Chronic >30days Status: [initial/improving/no change/deteriorating] Measurements: 
  Wound Length: 7.4 cm Wound Width :4.7 cm Wound Depth :0.1 Tissue Type:  
Epithelial: without necrosis Granulation:without necrosis Subcutaneous: without necrosis Slough: yes Eschar: NA 
 
Gangrene: NA 
 Drainage: decreased Debridement: required today to promote wound healingRight  Pressure Calf To Fat Layer Character of Ulcer: stable Indication for Debridement: Slough, Exudate, Abnormal wound edge and Abnormal Wound base Pre debridement measurements: 7.4 cm x 4.7 cm x 0.1 cm Risks of procedure were discussed with patient. Consent has been signed. Anesthetic: Lidocaine 4% topical cream  
 
Level of Debridement:subcutaneous tissue Tissue and/or Material removed: Exudate, Fibrin/ Slough and Subcutaneous Type of Tissue: Viable Pre-debridement severity: Fat Layer Exposed Post debridement severity: Fat Layer exposed Instrument(s) used: Curette Bleeding controlled with: Pressure Estimated blood loss: Minimal 
 
Pre debridement measurements: 7.5 cm x 4.8 cm x 0.2 cm Post procedural pain: mild Patient tolerated procedure well. Infection: no 
 
 
Edema: yes Status: improved Edema is being managed with: tubigrip Patient educated on the importance of edema control yes Lower Extremity Circulation Previous studies indicate no history of vascular disease Compression: yes Status: Continue Type: tubigrip F Patient Compliant: yes Neuropathy: NA Offloading: NA 
 
Nicotine/Tobacco Use: No 
 
 
Nutrition: 
Status obese Diabetic: type 2 Glucose Control: not controlled Recent Hgb A1C: 8.4 Assessment: 1. Venous leg ulcer of the RLE secondary to prior trauma 2. Diabetes mellitus with CKD Plan: 
-Debridement as noted above 
-Continue dressing daily with [employer] ag, sterile gauze dressing.  
- Wear single layer tubigrip to both legs daily. -May consider unna boot in the future if wound continues to not show significant improvement 
-Patient in agreement with assessment and plan 
-F/u 1 week

## 2018-12-11 ENCOUNTER — HOSPITAL ENCOUNTER (OUTPATIENT)
Dept: WOUND CARE | Age: 58
Discharge: HOME | End: 2018-12-11
Payer: COMMERCIAL

## 2018-12-11 VITALS
RESPIRATION RATE: 16 BRPM | DIASTOLIC BLOOD PRESSURE: 82 MMHG | HEART RATE: 88 BPM | TEMPERATURE: 96.7 F | SYSTOLIC BLOOD PRESSURE: 153 MMHG

## 2018-12-11 PROCEDURE — 74011000250 HC RX REV CODE- 250: Performed by: PODIATRIST

## 2018-12-11 PROCEDURE — 11042 DBRDMT SUBQ TIS 1ST 20SQCM/<: CPT

## 2018-12-11 RX ORDER — LIDOCAINE 40 MG/G
CREAM TOPICAL
Status: COMPLETED | OUTPATIENT
Start: 2018-12-11 | End: 2018-12-11

## 2018-12-11 RX ADMIN — LIDOCAINE: 4 CREAM TOPICAL at 13:51

## 2018-12-11 NOTE — WOUND CARE
12/11/18 1342   Wound Leg Lower Right;Lateral   Date First Assessed/Time First Assessed: 11/14/18 0830   POA: Yes  Wound Type: Pressure injury  Location: Leg Lower  Orientation: Right;Lateral   DRESSING STATUS Clean, dry, and intact   DRESSING TYPE Aquacel;Alginate;Gauze;Gauze wrap (arnie); Special tape (comment)   Wound Length (cm) 6 cm   Wound Width (cm) 6 cm   Wound Depth (cm) 0.1   Wound Surface area (cm^2) 36 cm^2   Change in Wound Size % 37.5   Condition of Base Slough;Granulation   Condition of Edges Open   Drainage Amount  Moderate   Drainage Color Serosanguinous   Wound Odor None   Periwound Skin Condition Edematous   Cleansing and Cleansing Agents  Normal saline     Visit Vitals  /82   Pulse 88   Temp 96.7 °F (35.9 °C)   Resp 16        RLE Peripheral Vascular   Capillary Refill: Less than/equal to 3 seconds (12/11/18 1341)  Color: Appropriate for race (12/11/18 1341)  Temperature: Warm (12/11/18 1341)  Sensation: Present (12/11/18 1341)  Pedal Pulse: Present (12/11/18 1341)  Circumference of Calf (cm): 45 cm (12/11/18 1341)  Location of Measurement (Calf): Mid  (12/11/18 1341)  Circumference of Ankle (cm): 24 cm (12/11/18 1341)  Location of Measurement (Ankle): Upper  (12/11/18 1341)

## 2018-12-11 NOTE — PROGRESS NOTES
Patient presents to wound clinic for: Leola Manzo is a 60 y.o. female who presents for subsequent wound care by myself of the following:  Right lower leg wound. Patient has a history of diabetes mellitus, HTN, CKD, HLD, obesity, afib, SSS. She has previously by evaluated by Dr. Howard Myers for this wound. She relates that she initially injured the  extremity a little over a month ago by bumping into something in her home (relates that  is a hoarder than that she is frequently bumping into things. Relates that the area is decreasing in size. Has only been using one tubigrip instead of two because she relates that it hurts otherwise. She relates that she has to stand at work. Denies any recent infection to the wound.      Pertinent Medical History:  Past Medical History:   Diagnosis Date    Acquired lymphedema     Right arm    Arrhythmia     Asthma     Atrial fibrillation (HCC)     Dr. Paige Maldonado and Dr. Sushila Garcia Atrial flutter ([facility]     Cancer [facility])     bilateral breast    Chronic kidney disease     Diabetes mellitus type II     Dizziness     Essential hypertension     Hyperlipidemia     Hypertension     Long term (current) use of anticoagulants     Morbid obesity ([facility] [address] 3/14/2012    Osteoarthritis     Pacemaker     Sick sinus syndrome ([facility] [address]      Past Surgical History:   Procedure Laterality Date    ABDOMEN SURGERY PROC UNLISTED      gastric bypass    GASTRIC BYPASS,OBESE<150CM [facility]  7/08    [facility]    HX AFIB ABLATION      HX CARPAL TUNNEL RELEASE      HX CERVICAL FUSION  1994    HX DILATION AND CURETTAGE  1992    HX MASTECTOMY  1998    RIGHT MODIFIED RADICAL     HX MOHS PROCEDURES  1995    right    HX ORTHOPAEDIC      ight knee surgery    HX PACEMAKER      IR INSERT TUNL CVC W PORT OVER 5 YEARS      [address] [place] [address]  8.21.07    [place] [address] AND 1994    [place] [facility] OVARIAN CYST ABD APPROACH  8.21.07    RIGHT      Prior to Admission medications    Medication Sig Start Date End Date Taking? Authorizing Provider   warfarin (COUMADIN) 4 mg tablet Take none today (11/30) or tomorrow (12/01), then 1 tablet on Mondays and Fridays and a half tablet the other 5 days. 11/30/18   Kymberly Aragon MD   sertraline (ZOLOFT) 50 mg tablet TAKE ONE AND ONE-HALF (1 & 1/2) TABLET BY MOUTH DAILY 11/16/18   Kymberly Aragon MD   insulin glargine (LANTUS U-100 INSULIN) 100 unit/mL injection 20 Units by SubCUTAneous route daily. 11/13/18   Kymberly Aragon MD   bumetanide (BUMEX) 1 mg tablet TAKE ONE TABLET BY MOUTH TWICE A DAY 9/4/18   William Ryan MD   hydrALAZINE (APRESOLINE) 100 mg tablet TAKE ONE TABLET BY MOUTH THREE TIMES A DAY 7/24/18   Kymberly Aragon MD   metoprolol succinate (TOPROL-XL) 100 mg tablet TAKE TWO TABLETS BY MOUTH DAILY 7/23/18   Kymberly Aragon MD   doxazosin (CARDURA) 4 mg tablet TAKE ONE TABLET BY MOUTH ONCE NIGHTLY 6/11/18   Kymberly Aragon MD   busPIRone (BUSPAR) 10 mg tablet Take one po twice daily 5/15/18   Tara Retana NP   TRUEPLUS INSULIN 0.5 mL 31 gauge x 5/16 syrg  2/14/18   Provider, Historical   potassium chloride SR (KLOR-CON 10) 10 mEq tablet TAKE ONE TABLET BY MOUTH DAILY 3/7/18   William Ryan MD   cloNIDine HCl (CATAPRES) 0.3 mg tablet TAKE ONE TABLET BY MOUTH TWICE A DAY 2/26/18   Kymberly Aragon MD   metolazone (ZAROXOLYN) 5 mg tablet Take 1 Tab by mouth daily as needed (take if develop increased LE edema, weight gain > 5 pounds. ). 4/10/14   Shad Melara NP   cholecalciferol, VITAMIN D3, (VITAMIN D3) 5,000 unit tab tablet Take 5,000 Units by mouth daily. Provider, Historical   vitamin A 8,000 unit capsule Take 8,000 Units by mouth daily. Provider, Historical   ACETAMINOPHEN/DIPHENHYDRAMINE (TYLENOL PM PO) Take 2 Tabs by mouth nightly as needed.     Provider, Historical   insulin lispro (HUMALOG) 100 unit/mL kwikpen 2 units with dinner for sugars over 200 1/3/14   Juan Da Silva MD   cyanocobalamin (VITAMIN B-12) 1,000 mcg sublingual tablet Take 1,000 mcg by mouth daily. Provider, Historical   calcium carbonate (TUMS) 200 mg calcium (500 mg) Chew Take 1 Tab by mouth three (3) times daily (after meals).     Provider, Historical     Allergies   Allergen Reactions    Ace Inhibitors Cough    Amlodipine Swelling    Avapro [Irbesartan] Unable to Obtain    Bactrim [Sulfamethoxazole-Trimethoprim] Other (comments)     Sore mouth    Captopril Cough    Carvedilol Diarrhea    Contrast Agent [Iodine] Itching    Fish Containing Products Hives    Morphine Nausea and Vomiting and Swelling    Penicillins Hives    Pravachol [Pravastatin] Nausea Only    Seafood [Shellfish Containing Products] Hives    Singulair [Montelukast] Other (comments)     palpitations     Family History   Problem Relation Age of Onset    Cancer Mother     Heart Disease Mother     Alcohol abuse Father     Diabetes Brother     Hypertension Brother      Social History     Socioeconomic History    Marital status:      Spouse name: Not on file    Number of children: Not on file    Years of education: Not on file    Highest education level: Not on file   Social Needs    Financial resource strain: Not on file    Food insecurity - worry: Not on file    Food insecurity - inability: Not on file   [employer] needs - medical: Not on file   [employer] needs - non-medical: Not on file   Occupational History    Not on file   Tobacco Use    Smoking status: Never Smoker    Smokeless tobacco: Never Used   Substance and Sexual Activity    Alcohol use: Yes     Comment: rare    Drug use: No    Sexual activity: Not on file   Other Topics Concern    Not on file   Social History Narrative    Not on file       Vitals:    12/11/18 1340   BP: 153/82   Pulse: 88   Resp: 16   Temp: 96.7 °F (35.9 °C)       Review of Systems:    Gen: No fever, chills, malaise, weight loss/gain. Heent: No headache, rhinorrhea, epistaxis, ear pain, hearing loss, sinus pain, neck pain/stiffness, sore throat. Heart: No chest pain, palpitations, CHOW, pnd, or orthopnea. Resp: No cough, hemoptysis, wheezing and shortness of breath. GI: No nausea, vomiting, diarrhea, constipation, melena or hematochezia. : No urinary obstruction, dysuria or hematuria. Derm: see below  Musc/skeletal: no bone or joint complains. Vasc: No edema, cyanosis or claudication. Endo: No heat/cold intolerance, no polyuria,polydipsia or polyphagia. Neuro: No unilateral weakness, numbness, tingling. No seizures. Heme: No easy bruising or bleeding. Wound Description:   Wound Type: Pressure/Traumatic   Indication: Chronic >30days   Status: [initial/improving/no change/deteriorating]   Measurements:    Wound Length: 6 cm      Wound Width :6 cm      Wound Depth :0.1   Tissue Type:   Epithelial: without necrosis  Granulation:without necrosis  Subcutaneous: without necrosis  Slough: yes  Eschar: NA    Gangrene: NA   Drainage: decreased    Debridement: required today to promote wound healingRight  Pressure Calf To Fat Layer    Character of Ulcer: stable    Indication for Debridement: Slough, Exudate, Abnormal wound edge and Abnormal Wound base     Pre debridement measurements: 6cm x 6cm x 0.1 cm    Risks of procedure were discussed with patient. Consent has been signed.      Anesthetic: Lidocaine 4% topical cream     Level of Debridement:subcutaneous tissue    Tissue and/or Material removed: Exudate, Fibrin/ Slough and Subcutaneous    Type of Tissue: Viable      Pre-debridement severity: Fat Layer Exposed     Post debridement severity: Fat Layer exposed    Instrument(s) used: Curette    Bleeding controlled with: Pressure    Estimated blood loss: Minimal    Pre debridement measurements:6.1 cm x 6.1 cm x 0.2 cm    Post procedural pain: mild    Patient tolerated procedure well. Infection: no      Edema: yes   Status: improved   Edema is being managed with: tubigrip   Patient educated on the importance of edema control yes    Lower Extremity Circulation   Previous studies indicate no history of vascular disease    Compression: yes   Status: Continue   Type: tubigrip F  Patient Compliant: yes    Neuropathy: NA    Offloading: NA    Nicotine/Tobacco Use: No      Nutrition:  Status obese  Diabetic: type 2  Glucose Control: not controlled  Recent Hgb A1C: 8.4    Assessment:  1. Venous leg ulcer of the RLE secondary to prior trauma  2. Diabetes mellitus with CKD    Plan:  -Debridement as noted above  -Continue dressing daily with [employer] ag, sterile gauze dressing.   - Wear single layer tubigrip to both legs daily. Explained that it would be more benefit to wear double tubigrip, but pt refusing at this time.   -May consider unna boot in the future if wound continues to not show significant improvement  -Patient in agreement with assessment and plan  -F/u 1 week

## 2018-12-11 NOTE — WOUND CARE
12/11/18 1359   Wound Leg Lower Right;Lateral   Date First Assessed/Time First Assessed: 11/14/18 0830   POA: Yes  Wound Type: Pressure injury  Location: Leg Lower  Orientation: Right;Lateral   Dressing Type Applied Silver products; Alginate;Gauze;Gauze wrap (arnie); Special tape (comment)  (Tubi  F)   Wound Procedure Type Debridement- Surgical   Procedure Time Out 1359   Consent Obtained  Yes   Procedure Anesthia  Topical lidocaine   Procedure Instrument Curette # 7   Procedure Bleeding Yes   Hemostasis 4x4   Procedure Pain Scale Numeric 0/10   Post Procedure Procedure Pain Scale Numeric 1/10   Procedure Tolerated Well       Patient was discharged with Self to home and was ambulatory.  In stable condition with c/o pain:1__/10_

## 2018-12-13 ENCOUNTER — LAB ONLY (OUTPATIENT)
Dept: INTERNAL MEDICINE CLINIC | Facility: CLINIC | Age: 58
End: 2018-12-13

## 2018-12-13 DIAGNOSIS — I48.0 PAF (PAROXYSMAL ATRIAL FIBRILLATION) (HCC): ICD-10-CM

## 2018-12-14 ENCOUNTER — TELEPHONE (OUTPATIENT)
Dept: INTERNAL MEDICINE CLINIC | Facility: CLINIC | Age: 58
End: 2018-12-14

## 2018-12-14 DIAGNOSIS — I48.0 PAF (PAROXYSMAL ATRIAL FIBRILLATION) (HCC): Primary | ICD-10-CM

## 2018-12-14 LAB
INR PPP: 2.8 (ref 0.8–1.2)
PROTHROMBIN TIME: 27.3 SEC (ref 9.1–12)

## 2018-12-14 NOTE — PROGRESS NOTES
Inform patient INR is therapeutic. Confirm current dose of warfarin 4 mg tablets 1 tablet on Mondays and Fridays and a half tablet the other 5 days . No change in warfarin dose. Repeat in one month.  Message also sent in My Chart

## 2018-12-18 ENCOUNTER — HOSPITAL ENCOUNTER (OUTPATIENT)
Dept: WOUND CARE | Age: 58
Discharge: HOME | End: 2018-12-18
Payer: COMMERCIAL

## 2018-12-18 VITALS
RESPIRATION RATE: 16 BRPM | HEART RATE: 89 BPM | TEMPERATURE: 98 F | SYSTOLIC BLOOD PRESSURE: 145 MMHG | DIASTOLIC BLOOD PRESSURE: 73 MMHG

## 2018-12-18 PROCEDURE — 11045 DBRDMT SUBQ TISS EACH ADDL: CPT

## 2018-12-18 PROCEDURE — 74011000250 HC RX REV CODE- 250: Performed by: PODIATRIST

## 2018-12-18 PROCEDURE — 11042 DBRDMT SUBQ TIS 1ST 20SQCM/<: CPT

## 2018-12-18 RX ORDER — LIDOCAINE 40 MG/G
CREAM TOPICAL
Status: COMPLETED | OUTPATIENT
Start: 2018-12-18 | End: 2018-12-18

## 2018-12-18 RX ADMIN — LIDOCAINE: 4 CREAM TOPICAL at 15:15

## 2018-12-18 NOTE — WOUND CARE
12/18/18 1511   Wound Leg Lower Right;Lateral   Date First Assessed/Time First Assessed: 11/14/18 0830   POA: Yes  Wound Type: Pressure injury  Location: Leg Lower  Orientation: Right;Lateral   DRESSING STATUS Clean, dry, and intact   Wound Length (cm) 8.2 cm   Wound Width (cm) 6 cm   Wound Depth (cm) 0.1   Wound Surface area (cm^2) 49.2 cm^2   Change in Wound Size % 14.58   Condition of Base Slough   Condition of Edges Open   Drainage Amount  Moderate   Drainage Color Tan   Wound Odor None   Periwound Skin Condition Intact   Cleansing and Cleansing Agents  Normal saline     Visit Vitals  /73   Pulse 89   Temp 98 °F (36.7 °C)   Resp 16        RLE Peripheral Vascular   Capillary Refill: Less than/equal to 3 seconds (12/18/18 1512)  Color: Appropriate for race (12/18/18 1512)  Temperature: Warm (12/18/18 1512)  Sensation: Present (12/18/18 1512)  Pedal Pulse: Present (12/18/18 1512)  Circumference of Calf (cm): 43 cm (12/18/18 1512)  Location of Measurement (Calf): Mid  (12/18/18 1512)  Circumference of Ankle (cm): 23.5 cm (12/18/18 1512)  Location of Measurement (Ankle): Upper  (12/18/18 1512)

## 2018-12-18 NOTE — WOUND CARE
12/18/18 1542   Wound Leg Lower Right;Lateral   Date First Assessed/Time First Assessed: 11/14/18 0830   POA: Yes  Wound Type: Pressure injury  Location: Leg Lower  Orientation: Right;Lateral   Dressing Type Applied Silver products; Alginate;Gauze;Gauze wrap (arnie); Special tape (comment)  (Double Tubi  F)   Wound Procedure Type Debridement- Surgical   Procedure Time Out 1542   Consent Obtained  Yes   Procedure Anesthia  Topical lidocaine   Procedure Instrument Scaple ! 5   Procedure Bleeding Yes   Hemostasis 4x4   Procedure Pain Scale Numeric 6/10   Post Procedure Procedure Pain Scale Numeric 6/10   Procedure Tolerated Fair       Patient was discharged with Self to home and was ambulatory.  In stable condition with c/o pain:_6_/10_

## 2018-12-28 ENCOUNTER — HOSPITAL ENCOUNTER (OUTPATIENT)
Dept: WOUND CARE | Age: 58
Discharge: HOME | End: 2018-12-28
Payer: COMMERCIAL

## 2018-12-28 VITALS
DIASTOLIC BLOOD PRESSURE: 97 MMHG | HEART RATE: 83 BPM | TEMPERATURE: 96.6 F | SYSTOLIC BLOOD PRESSURE: 190 MMHG | RESPIRATION RATE: 16 BRPM

## 2018-12-28 PROBLEM — S81.811D LACERATION OF RIGHT LEG EXCLUDING THIGH, SUBSEQUENT ENCOUNTER: Status: ACTIVE | Noted: 2018-12-28

## 2018-12-28 PROCEDURE — 11045 DBRDMT SUBQ TISS EACH ADDL: CPT

## 2018-12-28 PROCEDURE — 11042 DBRDMT SUBQ TIS 1ST 20SQCM/<: CPT

## 2018-12-28 NOTE — WOUND CARE
12/28/18 8821 Wound Leg Lower Right;Lateral  
Date First Assessed/Time First Assessed: 11/14/18 0830   POA: Yes  Wound Type: Pressure injury  Location: Leg Lower  Orientation: Right;Lateral  
DRESSING STATUS Removed DRESSING TYPE (Aquacel AG, 4x4s, roll gauze, tubigrip) Wound Length (cm) 8.5 cm Wound Width (cm) 6 cm Wound Depth (cm) 0.1 Wound Surface area (cm^2) 51 cm^2 Change in Wound Size % 11.46 Condition of Base Pink;Slough Condition of Edges Open Drainage Amount  Moderate Drainage Color Serous Wound Odor None Periwound Skin Condition Intact Cleansing and Cleansing Agents  Chlorhexidine gluconate 4% Dressing Type Applied (Aquacel AG, 4x4s,. unna boot) Visit Vitals BP (!) 190/97 Pulse 83 Temp 96.6 °F (35.9 °C) Resp 16 Patient discharged to home ambulatory, denies pain, instructed to leave dressing in place until next clinic visit on Friday 01/04/19 and to avoid getting the unna boot wet and she verbalized understanding.

## 2018-12-28 NOTE — PROGRESS NOTES
[address] 
WOUND CARE PROGRESS NOTE Yuliet Reina 
MR#: [id number] : 1960 ACCOUNT #: [de-identified] DATE OF SERVICE: 2018 The patient is being seen today by me due to Dr. Erin Yi absence from the office this week and next week. The patient had a posttraumatic wound of the right leg, S81.811D with a venous hypertension ulcer, I87.311, which goes down to subcutaneous tissue, L97.912, in a type 2 diabetic, E11.622 and secondary lymphedema  (I89.0). The patient had a good week. She has had no problems, no changes in her medications, allergies or review of systems. I reviewed with her ABIs and TBIs and her right TBI is excellent at 0.77 and she has normal PVR waveforms in that leg. Her venous study shows no clot and there is no evidence of reflux, which would be amenable to intervention. I reviewed with the patient, her wound dimensions since 2018 at which time they were 7.4 x 4.5 x 0.1 cm. On 2018 they were slightly larger 7.4 x 4.7 x 0.1 cm. On 2018 they were large at 8.2 x 6 x 0.1 cm and today they are 8.5 x 6 x 0.1 cm. I explained to her that we are not addressing her problem, which is edema and she has bilateral hemosiderin deposition showing the chronic nature of her bilateral venous leg ulcers. She denies any changes in her medications, allergies or review of systems since she was last seen. PHYSICAL EXAMINATION:  Her temperature today is 96.6, pulse 83, respirations 16, blood pressure elevated 190/97, when last week was 145/73. As stated above, the dimensions are 8.5 x 6 x 0.1 cm. The wound is filled with slough and necrotic material in the center of the wound. She has pitting edema and is recommended that this wound be debrided. I explained the risks and benefits. The patient understands and wishes to proceed. Therefore, topical Xylocaine 4% lidocaine gel was applied for 10 minutes. With the use of a 7 mm ring curette,  the wound was debrided down to underlying fat to remove all devitalized tissue. Hemostasis was achieved with firm pressure until dry. The wound was then scrubbed with Hibiclens followed by normal saline. ASSESSMENT AND PLAN:  I have explained to the patient that to properly address this wound we must treat it as a venous leg ulcer and I recommend that we Apply Aquacel Ag to the wound. We will apply an Unna boot since she is so mobile. I have asked her to keep her legs elevated as much as possible. I have instructed her on how to do gastrocnemius muscle exercises hourly while awake. I have asked her to take frequent breaks from walking to elevate her legs. I explained that if her toes become dusky, she must elevate her legs. If that does not help she should remove the [employer] I explained that if the [employer] becomes saturated she should call the office in 3 days and the nurses will change it. If the Unna boot slips down because of the external compression, making the leg smaller, she should call the office in 3 days to have the [employer] changed. I will plan on seeing her again in followup in 1 week. All questions were answered. CONDITION ON DISCHARGE:  Stable. MD ITALO Solitario / Teresa Viera 
D: 12/28/2018 09:20    
T: 12/28/2018 09:46 JOB #: [id number]

## 2018-12-28 NOTE — WOUND CARE
12/28/18 0806 Vital Signs Temp 96.6 °F (35.9 °C) Temp Source Oral  
Pulse (Heart Rate) 83 Resp Rate 16 Level of Consciousness Alert  
BP (!) 190/97 MAP (Calculated) 128 BP 1 Method Automatic MEWS Score 1 Pain 1 Pain Scale 1 Numeric (0 - 10) Pain Intensity 1 3 Pain Location 1 Leg Pain Orientation 1 Right Pain Description 1 Aching Pain Intervention(s) 1 Medication (see MAR) Visit Vitals BP (!) 190/97 Pulse 83 Temp 96.6 °F (35.9 °C) Resp 16

## 2019-01-02 ENCOUNTER — TELEPHONE (OUTPATIENT)
Dept: WOUND CARE | Age: 59
End: 2019-01-02

## 2019-01-02 NOTE — TELEPHONE ENCOUNTER
Patient called stating drainage coming through unna boot. Called and asked patient to cut the dressing and use wound care as ordered and apply tubi  until the appointment on Friday.

## 2019-01-04 ENCOUNTER — HOSPITAL ENCOUNTER (OUTPATIENT)
Dept: WOUND CARE | Age: 59
Discharge: HOME | End: 2019-01-04
Payer: COMMERCIAL

## 2019-01-04 VITALS
HEART RATE: 76 BPM | DIASTOLIC BLOOD PRESSURE: 84 MMHG | TEMPERATURE: 97.2 F | SYSTOLIC BLOOD PRESSURE: 154 MMHG | RESPIRATION RATE: 18 BRPM

## 2019-01-04 DIAGNOSIS — S81.811D LACERATION OF RIGHT LEG EXCLUDING THIGH, SUBSEQUENT ENCOUNTER: ICD-10-CM

## 2019-01-04 PROCEDURE — 11043 DBRDMT MUSC&/FSCA 1ST 20/<: CPT

## 2019-01-04 PROCEDURE — 11042 DBRDMT SUBQ TIS 1ST 20SQCM/<: CPT

## 2019-01-04 NOTE — PROGRESS NOTES
[address] 
WOUND CARE PROGRESS NOTE Maegan Jorge 
MR#: [id number] : 1960 ACCOUNT #: [de-identified] DATE OF SERVICE: 2019 The patient returns for treatment of a posttraumatic wound to the right leg, S81.811D, with a venous insufficiency wound, I87.311, which extends down to subcutaneous tissue L97.912, in a type 2 diabetic, E11.622, with secondary lymphedema,  I89.0. The patient was seen last week. We started her on Aquacel AG and Unna boot. She had excessive drainage and removed the unna boot 48 hours ago and has not been using compression since then. She had no other problems over the past week and no changes in her medications, allergies or review of systems. PHYSICAL EXAMINATION: 
VITAL SIGNS:  Her temperature today is 97.2, pulse 76, respirations 18, blood pressure 154/84. The wound dimensions last week were 8.5 x 6 x 0.1 cm. Today, they are 7.6 x 5 x 0.1 cm. The wound is covered with yellow white necrotic slough. It is recommended the wound be debrided. I explained the risks and benefits. The patient understands and wishes to proceed. Therefore, topical Xylocaine was applied for 10 minutes. With use of a 7 mm ring curette, the entire wound was debrided down to healthy bleeding subcutaneous tissue. Hemostasis was achieved with firm pressure until dry. The wound was scrubbed with Hibiclens followed by normal saline. ASSESSMENT AND PLAN:  We are going to continue Aquacel AG and an Unna boot. We are going to schedule a nursing visit once during the week to help manage the drainage. The patient will keep her leg elevated. She will do gastrocnemius muscle exercises hourly while awake. I will see her again in 1 week. All questions were answered. CONDITION ON DISCHARGE:  Stable. MD ITALO Aguirre / АЛЕКСАНДР 
D: 2019 09:25    
T: 2019 09:37 JOB #: [id number]

## 2019-01-04 NOTE — WOUND CARE
01/04/19 0818 Wound Leg Lower Right;Lateral  
Date First Assessed/Time First Assessed: 11/14/18 0830   POA: Yes  Wound Type: Pressure injury  Location: Leg Lower  Orientation: Right;Lateral  
DRESSING STATUS Clean, dry, and intact DRESSING TYPE Alginate;Gauze;Gauze wrap (arnie) Wound Length (cm) 7.6 cm Wound Width (cm) 5 cm Wound Depth (cm) 0.1 Wound Surface area (cm^2) 38 cm^2 Change in Wound Size % 34.03 Condition of Base Pink;Slough Condition of Edges Open Drainage Amount  Moderate Drainage Color Serosanguinous Wound Odor None Periwound Skin Condition Intact Cleansing and Cleansing Agents  Normal saline

## 2019-01-08 ENCOUNTER — HOSPITAL ENCOUNTER (OUTPATIENT)
Dept: WOUND CARE | Age: 59
Discharge: HOME | End: 2019-01-08
Payer: COMMERCIAL

## 2019-01-08 VITALS
DIASTOLIC BLOOD PRESSURE: 84 MMHG | HEART RATE: 84 BPM | RESPIRATION RATE: 18 BRPM | TEMPERATURE: 97.1 F | SYSTOLIC BLOOD PRESSURE: 181 MMHG

## 2019-01-08 PROCEDURE — 29581 APPL MULTLAYER CMPRN SYS LEG: CPT

## 2019-01-10 ENCOUNTER — LAB ONLY (OUTPATIENT)
Dept: INTERNAL MEDICINE CLINIC | Facility: CLINIC | Age: 59
End: 2019-01-10

## 2019-01-10 DIAGNOSIS — I48.0 PAF (PAROXYSMAL ATRIAL FIBRILLATION) (HCC): ICD-10-CM

## 2019-01-11 ENCOUNTER — HOSPITAL ENCOUNTER (OUTPATIENT)
Dept: WOUND CARE | Age: 59
Discharge: HOME | End: 2019-01-11
Payer: COMMERCIAL

## 2019-01-11 ENCOUNTER — TELEPHONE (OUTPATIENT)
Dept: INTERNAL MEDICINE CLINIC | Facility: CLINIC | Age: 59
End: 2019-01-11

## 2019-01-11 VITALS
HEART RATE: 84 BPM | TEMPERATURE: 96.9 F | DIASTOLIC BLOOD PRESSURE: 87 MMHG | SYSTOLIC BLOOD PRESSURE: 174 MMHG | RESPIRATION RATE: 16 BRPM

## 2019-01-11 DIAGNOSIS — I48.0 PAF (PAROXYSMAL ATRIAL FIBRILLATION) (HCC): ICD-10-CM

## 2019-01-11 LAB
INR PPP: 3.4 (ref 0.8–1.2)
PROTHROMBIN TIME: 32.5 SEC (ref 9.1–12)

## 2019-01-11 PROCEDURE — 74011000250 HC RX REV CODE- 250: Performed by: OTOLARYNGOLOGY

## 2019-01-11 PROCEDURE — 11042 DBRDMT SUBQ TIS 1ST 20SQCM/<: CPT

## 2019-01-11 RX ORDER — LIDOCAINE 40 MG/G
CREAM TOPICAL
Status: COMPLETED | OUTPATIENT
Start: 2019-01-11 | End: 2019-01-11

## 2019-01-11 RX ORDER — WARFARIN 4 MG/1
TABLET ORAL
Qty: 45 TAB | Refills: 3
Start: 2019-01-11 | End: 2019-04-08 | Stop reason: SDUPTHER

## 2019-01-11 RX ADMIN — LIDOCAINE: 40 CREAM TOPICAL at 09:00

## 2019-01-11 NOTE — WOUND CARE
01/11/19 2631 Wound Leg Lower Right;Lateral  
Date First Assessed/Time First Assessed: 11/14/18 0830   POA: Yes  Wound Type: Pressure injury  Location: Leg Lower  Orientation: Right;Lateral  
DRESSING STATUS Removed DRESSING TYPE Aquacel 
(Unna boot) Condition of Base Pink;Slough Condition of Edges Open Drainage Amount  Moderate Drainage Color Serosanguinous;Green Wound Odor None Periwound Skin Condition Intact Cleansing and Cleansing Agents  Soap and water

## 2019-01-11 NOTE — PROGRESS NOTES
Inform patient INR is too high. Confirm current dose of warfarin 4 mg tablets 1 tablet on Mondays and Fridays and a half tablet the other 5 days. . Change warfarin 4 mg tablets to 1 tablet on Mondays and a half tablet the other 6 days. Repeat INR in  2 weeks. Patient also informed in My Chart.

## 2019-01-11 NOTE — PROGRESS NOTES
[facility] 
WOUND CARE PROGRESS NOTE Maegan Jorge 
MR#: [id number] : 1960 ACCOUNT #: [de-identified] DATE OF SERVICE: 2019 The patient returns for treatment of a posttraumatic wound to the right leg, S81.811D, with a venous hypertension ulcer, I87.311, which goes down to subcutaneous tissue, L97.912, in a type 2 diabetic, E11.622, with secondary lymphedema, I89.0. The patient had a good week until she slipped in water at home and struck her right foot, on the heel. She developed no open wounds. There have been no changes in her medications, allergies or review of systems since last seen. PHYSICAL EXAMINATION: 
VITAL SIGNS:  Temperature is 96.9, pulse 84, respirations 16, blood pressure 174/87. EXTREMITIES:  Wound dimensions last week were 7.8 x 6 x 0.1 cm. The wound is smaller at 6.4 x 5 x 0.1 cm, but is filled with slough. It is recommended the wound be debrided. I explained the risks and benefits. The patient understands and wished to proceed. Therefore, topical Xylocaine was applied for 10 minutes. With the use of a 5 mm ring curette, the wound was completely debrided down to healthy underlying bleeding muscle. The external dimensions of the wound did not change, but the depth was significantly deeper at 0.3 cm. Hemostasis was achieved with firm pressure until dry. There was significant oozing due to her venous hypertension and oral warfarin. Once hemostasis was achieved, the wound was then scrubbed with Hibiclens followed by normal saline. ASSESSMENT AND PLAN:  Since the patient's wound is still very moist she still requires twice weekly treatments, but with her good response, we are going to continue Aquacel Ag and and Unna boot. She will keep her legs elevated. She will do gastrocnemius muscle exercises. She will have a nursing visit once during the week.   I will see her again in followup in one week. She understands that we might change the dressing to a different modality soon, but I am a little reluctant to go to collagen at this point, since she is still so moist, but if I do, I will add Hydrofera Blue as a second layer to help manage the secretions, but since she is getting a good response and the wound is responding, we are going to stay with the same dressings for now. All questions were answered. CONDITION ON DISCHARGE:  Stable. MD ITALO Quintero / RENÉ 
D: 01/11/2019 08:57 T: 01/11/2019 09:07 JOB #: [id number]

## 2019-01-11 NOTE — WOUND CARE
01/11/19 1148 Wound Leg Lower Right;Lateral  
Date First Assessed/Time First Assessed: 11/14/18 0830   POA: Yes  Wound Type: Pressure injury  Location: Leg Lower  Orientation: Right;Lateral  
Dressing Type Applied Special tape (comment);Gauze; Unna boot Wound Procedure Type Debridement- Surgical  
Procedure Time Out 8478 Consent Obtained  Yes Procedure Anesthia  Topical lidocaine Procedure Instrument Curette #5 Procedure Bleeding Yes Hemostasis 4x4 Procedure Pain Scale Numeric 0/10 Post Procedure Procedure Pain Scale Numeric 0/10 Procedure Tolerated Well Patient was discharged with Self to home and was ambulatory. In stable condition with c/o pain:0__/10_

## 2019-01-15 ENCOUNTER — HOSPITAL ENCOUNTER (OUTPATIENT)
Dept: WOUND CARE | Age: 59
Discharge: HOME | End: 2019-01-15
Payer: COMMERCIAL

## 2019-01-15 VITALS
RESPIRATION RATE: 16 BRPM | HEART RATE: 86 BPM | DIASTOLIC BLOOD PRESSURE: 78 MMHG | TEMPERATURE: 97.4 F | SYSTOLIC BLOOD PRESSURE: 154 MMHG

## 2019-01-15 PROCEDURE — 29581 APPL MULTLAYER CMPRN SYS LEG: CPT

## 2019-01-15 PROCEDURE — 29580 STRAPPING UNNA BOOT: CPT

## 2019-01-15 NOTE — WOUND CARE
01/15/19 1122 Wound Leg Lower Right;Lateral  
Date First Assessed/Time First Assessed: 11/14/18 0830   POA: Yes  Wound Type: Pressure injury  Location: Leg Lower  Orientation: Right;Lateral  
DRESSING STATUS Removed DRESSING TYPE (Aquacel AG, gauze, unna boot) Condition of Base Purple;Slough Condition of Edges Open Drainage Amount  Moderate Drainage Color Serosanguinous Wound Odor None Periwound Skin Condition Intact Cleansing and Cleansing Agents  Soap and water Dressing Type Applied (Aquacel AG, 4x4s, unna boot) Procedure Tolerated Well Visit Vitals /78 (BP 1 Location: Left arm, BP Patient Position: Sitting) Pulse 86 Temp 97.4 °F (36.3 °C) Resp 16 Patient discharged to home ambulatory denies pain, return to clinic on Friday 01/18/19 for MD visit

## 2019-01-18 ENCOUNTER — HOSPITAL ENCOUNTER (OUTPATIENT)
Dept: WOUND CARE | Age: 59
Discharge: HOME | End: 2019-01-18
Payer: COMMERCIAL

## 2019-01-18 VITALS
TEMPERATURE: 96.8 F | RESPIRATION RATE: 16 BRPM | SYSTOLIC BLOOD PRESSURE: 161 MMHG | HEART RATE: 60 BPM | DIASTOLIC BLOOD PRESSURE: 77 MMHG

## 2019-01-18 PROCEDURE — 11045 DBRDMT SUBQ TISS EACH ADDL: CPT

## 2019-01-18 PROCEDURE — 74011000250 HC RX REV CODE- 250: Performed by: OTOLARYNGOLOGY

## 2019-01-18 PROCEDURE — 11042 DBRDMT SUBQ TIS 1ST 20SQCM/<: CPT

## 2019-01-18 PROCEDURE — 11043 DBRDMT MUSC&/FSCA 1ST 20/<: CPT

## 2019-01-18 RX ORDER — LIDOCAINE 40 MG/G
CREAM TOPICAL AS NEEDED
Status: DISCONTINUED | OUTPATIENT
Start: 2019-01-18 | End: 2019-01-22 | Stop reason: HOSPADM

## 2019-01-18 RX ADMIN — LIDOCAINE: 40 CREAM TOPICAL at 08:57

## 2019-01-18 NOTE — PROGRESS NOTES
[address] 
WOUND CARE PROGRESS NOTE Jose Carlos Mckeon 
MR#: [id number] : 1960 ACCOUNT #: [de-identified] DATE OF SERVICE: 2019 SUBJECTIVE:  The patient returns for a posttraumatic wound to the right leg, S81.811D, with a venous hypertension ulcer, I87.311, L97.912, in a type 2 diabetic, E11.622. The patient had a good week. She did have a nursing visit during the week, but the dressing was not saturated at that time nor was it today. She has had no problems, no changes in her medications, allergies or review of systems. PHYSICAL EXAMINATION: 
VITAL SIGNS:  Temperature is 96.8, pulse 60, respirations 16, blood pressure 161/77. The wound is starting to contract in nicely and measured 6.5 x 4.2 x 0.1 cm. The entire wound was covered with yellow slough. It is recommended that the wound be debrided. I explained the risks and benefits. The patient understands and wishes to proceed. Therefore, topical Xylocaine 4% lidocaine gel was applied for 10 minutes. With use of a 5 mm ring curette, the wound was completely debrided down to underlying healthy muscle. Thick fibrotic bands were encountered and they were stripped out to get back to bleeding tissue. Hemostasis was achieved with gentle pressure until dry. The wound was then scrubbed with Hibiclens followed by normal saline. ASSESSMENT AND PLAN:  The patient is going to change the dressing today to Acticoat Flex 7 and cover it with Exu-Dry and an attempt to try to help with the slough. [facility] She will not have a nursing visit this week except if she has concerns such as increased drainage. She will continue to keep her leg elevated. She will continue to do gastrocnemius muscle exercises hourly while awake and she will return in 1 week. All questions were answered. CONDITION ON DISCHARGE:  Stable.  
 
 
MD ITALO Chaney / АЛЕКСАНДР 
D: 2019 09:23    
 T: 01/18/2019 09:34 
JOB #: [id number]

## 2019-01-18 NOTE — WOUND CARE
01/18/19 8325 Wound Leg Lower Right;Lateral  
Date First Assessed/Time First Assessed: 11/14/18 0830   POA: Yes  Wound Type: Pressure injury  Location: Leg Lower  Orientation: Right;Lateral  
DRESSING STATUS Removed DRESSING TYPE Aquacel;Alginate;Gauze; Unna boot Wound Length (cm) 6.5 cm Wound Width (cm) 4.2 cm Wound Depth (cm) 0.1 Wound Surface area (cm^2) 27.3 cm^2 Change in Wound Size % 52.6 Condition of Base Slough;Pink;Granulation Condition of Edges Open Drainage Amount  Moderate Drainage Color Serosanguinous Wound Odor None Periwound Skin Condition Intact Cleansing and Cleansing Agents  Soap and water;Normal saline Visit Vitals /77 Pulse 60 Temp 96.8 °F (36 °C) Resp 16 LLE Peripheral Vascular Capillary Refill: Less than/equal to 3 seconds (01/18/19 0852) Color: Appropriate for race (01/18/19 6319) Temperature: Warm (01/18/19 7827) Sensation: Present (01/18/19 1857) Pedal Pulse: Palpable (01/18/19 0852) Circumference of Calf (cm): 42 cm (01/18/19 0852) Location of Measurement (Calf): Mid  (01/18/19 0595) Circumference of Ankle (cm): 22.5 cm (01/18/19 0852) Location of Measurement (Ankle): Upper  (01/18/19 0497)

## 2019-01-18 NOTE — WOUND CARE
01/18/19 3180 Wound Leg Lower Right;Lateral  
Date First Assessed/Time First Assessed: 11/14/18 0830   POA: Yes  Wound Type: Pressure injury  Location: Leg Lower  Orientation: Right;Lateral  
Dressing Type Applied 4 x 4;Absorptive; Unna boot 
(acticoat 7) Patient was discharged with Self to home and was ambulatory. In stable condition with c/o pain:0__/10_

## 2019-01-24 ENCOUNTER — LAB ONLY (OUTPATIENT)
Dept: INTERNAL MEDICINE CLINIC | Facility: CLINIC | Age: 59
End: 2019-01-24

## 2019-01-24 DIAGNOSIS — I48.0 PAF (PAROXYSMAL ATRIAL FIBRILLATION) (HCC): ICD-10-CM

## 2019-01-25 ENCOUNTER — TELEPHONE (OUTPATIENT)
Dept: INTERNAL MEDICINE CLINIC | Facility: CLINIC | Age: 59
End: 2019-01-25

## 2019-01-25 ENCOUNTER — HOSPITAL ENCOUNTER (OUTPATIENT)
Dept: WOUND CARE | Age: 59
Discharge: HOME | End: 2019-01-25
Payer: COMMERCIAL

## 2019-01-25 VITALS
HEART RATE: 74 BPM | TEMPERATURE: 97.6 F | RESPIRATION RATE: 16 BRPM | SYSTOLIC BLOOD PRESSURE: 158 MMHG | DIASTOLIC BLOOD PRESSURE: 76 MMHG

## 2019-01-25 DIAGNOSIS — Z79.01 LONG TERM (CURRENT) USE OF ANTICOAGULANTS: Primary | ICD-10-CM

## 2019-01-25 LAB
INR PPP: 2.4 (ref 0.8–1.2)
PROTHROMBIN TIME: 23.5 SEC (ref 9.1–12)

## 2019-01-25 PROCEDURE — 74011000250 HC RX REV CODE- 250: Performed by: OTOLARYNGOLOGY

## 2019-01-25 PROCEDURE — 11042 DBRDMT SUBQ TIS 1ST 20SQCM/<: CPT

## 2019-01-25 RX ORDER — LIDOCAINE 40 MG/G
CREAM TOPICAL
Status: COMPLETED | OUTPATIENT
Start: 2019-01-25 | End: 2019-01-25

## 2019-01-25 RX ADMIN — LIDOCAINE: 40 CREAM TOPICAL at 09:00

## 2019-01-25 NOTE — PROGRESS NOTES
[address] 
WOUND CARE PROGRESS NOTE Mary Dickey 
MR#: [id number] : 1960 ACCOUNT #: [de-identified] DATE OF SERVICE: 2019 The patient returns for a posttraumatic wound to the right leg, S81.811D, with a venous hypertension ulcer, I87.311, L97.912, with type 2 diabetes E11.622. The patient had a good week. We switched to Acticoat Flex 7 last week along with, Exu-Dry and a 3-layer wrap. She had no problems, nor changes in her medications, allergies or review of systems. PHYSICAL EXAMINATION:   
VITAL SIGNS:  Temperature is 97.6, pulse 74, respirations 16, blood pressure 158/76. WOUNDS:  The wound dimensions today are slightly smaller at 6.5 x 3.8 x 0.1 cm. The wound continues to be covered with a layer of slough. It is recommended that this be debrided. I explained the risks and benefits. The patient understands and wished to proceed. Therefore, topical Xylocaine was applied for 10 minutes. With use of a 7 mm ring curette, the wound was debrided down to nice healthy bleeding muscle. Hemostasis was achieved with gentle pressure until dry. The external dimensions did not change, but the depth increased to 0.2 cm. The wound was then scrubbed with Hibiclens followed by normal saline. ASSESSMENT AND PLAN:  Since the patient did get a good response after 1 week of Acticoat. We are going to continue Acticoat Flex 7 this week and see how she does over the next week. At some point we might switch to collagen plus Hydrofera Blue, but for now she is getting a good response and we are going to continue it for now. She will continue to keep her legs elevated as much as possible. She will continue to do gastrocnemius muscle exercises hourly while awake and I will see her again in followup in 1 week. MD ITALO Verde / RENÉ 
D: 2019 09:23    
T: 2019 09:37 JOB #: [id number]

## 2019-01-25 NOTE — PROGRESS NOTES
Verified two patient identifiers, name and . Pt notified of result, currently taking 4 mg coumadin 1 tablet on  and a half tablet the other 6 days. Next lab is 19. Pt had no further questions at this time.

## 2019-01-25 NOTE — PROGRESS NOTES
Inform patient INR is therapeutic. Confirm current dose of warfarin 4 mg tablets 1 tablet on Mondays and a half tablet the other 6 days. . No change in warfarin dose. Repeat in one month.  Message also sent in My Chart

## 2019-01-28 RX ORDER — HYDRALAZINE HYDROCHLORIDE 100 MG/1
TABLET, FILM COATED ORAL
Qty: 90 TAB | Refills: 2 | Status: SHIPPED | OUTPATIENT
Start: 2019-01-28 | End: 2019-06-24 | Stop reason: SDUPTHER

## 2019-02-01 ENCOUNTER — HOSPITAL ENCOUNTER (OUTPATIENT)
Dept: WOUND CARE | Age: 59
Discharge: HOME | End: 2019-02-01
Payer: COMMERCIAL

## 2019-02-01 VITALS — DIASTOLIC BLOOD PRESSURE: 82 MMHG | SYSTOLIC BLOOD PRESSURE: 157 MMHG | TEMPERATURE: 97 F | RESPIRATION RATE: 16 BRPM

## 2019-02-01 PROCEDURE — 11042 DBRDMT SUBQ TIS 1ST 20SQCM/<: CPT

## 2019-02-01 PROCEDURE — 74011000250 HC RX REV CODE- 250: Performed by: OTOLARYNGOLOGY

## 2019-02-01 RX ORDER — LIDOCAINE 40 MG/G
CREAM TOPICAL ONCE
Status: COMPLETED | OUTPATIENT
Start: 2019-02-01 | End: 2019-02-01

## 2019-02-01 RX ADMIN — LIDOCAINE: 40 CREAM TOPICAL at 08:50

## 2019-02-01 NOTE — WOUND CARE
02/01/19 7459 Vital Signs Temp 97 °F (36.1 °C) Temp Source Oral  
Heart Rate Source Brachial;Left Resp Rate 16 Level of Consciousness Alert /82 MAP (Calculated) 107 BP 1 Method Automatic  
BP 1 Location Left arm BP Patient Position Post activity Pain 1 Pain Scale 1 Numeric (0 - 10) Pain Intensity 1 0 Wound dressed, UNNA boot to right leg, discharged from center, ambulatory, no pain. . To return to center in 1 week.

## 2019-02-01 NOTE — WOUND CARE
02/01/19 5541 Wound Leg Lower Right;Lateral  
Date First Assessed/Time First Assessed: 11/14/18 0830   POA: Yes  Wound Type: Pressure injury  Location: Leg Lower  Orientation: Right;Lateral  
Dressing Status  Intact; Removed Dressing Type  Unna boot Non-Pressure Injury Full thickness (subcut/muscle) Wound Length (cm) 6 cm Wound Width (cm) 3.8 cm Wound Depth (cm) 0.1 Wound Surface area (cm^2) 22.8 cm^2 Change in Wound Size % 60.42 Condition of Base Pink;Granulation; Hypergranulation Condition of Edges Open Tissue Type Red;Yellow;Pink Drainage Amount  Moderate Drainage Color Tan;Serosanguinous Wound Odor None Cleansing and Cleansing Agents  Normal saline

## 2019-02-01 NOTE — PROGRESS NOTES
[address] 
WOUND CARE PROGRESS NOTE Anuja Villanueva 
MR#: [id number] : 1960 ACCOUNT #: [de-identified] DATE OF SERVICE: 2019 SUBJECTIVE:  The patient returns for treatment of a posttraumatic wound to the right leg, S81.811D, with a venous hypertension ulcer, I87.311, which goes down to subcutaneous tissue, L97.912, in a type 2 diabetic, E11.622. Last week, we continued Acticoat Flex 7 and an Unna boot. The patient had a good week. She had no problems nor changes in her medications, allergies, or review of systems. OBJECTIVE: 
VITAL SIGNS:  Her temperature is 97, pulse 72, respirations 16, blood pressure 157/82. WOUND EXAMINATION:  The wound dimensions today are improved at 6 x 3.8 x 0.1 cm. The base of the wound consists of nice healthy islands of granulation tissue interspersed with slough, but there is less than there was last week, and the wound itself is actually aria in nicely, especially along the distal portion of the wound, so the dimensions are essentially overstating the true size of the wound. It is recommended that all slough be removed. I explained the risks and benefits. The patient understands and wishes to proceed. Therefore, topical Xylocaine was applied for 10 minutes. With the use of a 5 mm ring curette, all slough was removed down to underlying subcutaneous tissue. Nice healthy bleeding granulation tissue was encountered. The entire wound was then scrubbed with Hibiclens followed by normal saline. The external dimensions did not change, but the depth now extended down to 0.2 cm. ASSESSMENT AND PLAN:  The patient is doing well on the current therapy. Therefore, we are going to continue Acticoat Flex 7 and an Unna boot. I am not going to switch her to collagen at this time since she is improving.   She will need long-term compression stockings and therefore we are going to start with 15-20 mmHg stockings. She will start wearing that on the left leg. If she is comfortable and able to apply and remove it, then we will see how she does after healing with using that strength on the right side. She understands that if she fails in spite of doing everything correctly, then we must go up to 20-30 mmHg stockings, so we are encouraging her to use that strength stocking once she has healed on the right side. All questions were answered. Her condition on discharge was stable. She will return in 1 week. MD ITALO Nascimento / АЛЕКСАНДР 
D: 02/01/2019 09:15    
T: 02/01/2019 09:44 JOB #: [id number]

## 2019-02-05 ENCOUNTER — HOSPITAL ENCOUNTER (OUTPATIENT)
Dept: WOUND CARE | Age: 59
Discharge: HOME | End: 2019-02-05
Payer: COMMERCIAL

## 2019-02-05 VITALS
SYSTOLIC BLOOD PRESSURE: 188 MMHG | DIASTOLIC BLOOD PRESSURE: 79 MMHG | TEMPERATURE: 98.1 F | RESPIRATION RATE: 18 BRPM | HEART RATE: 79 BPM

## 2019-02-05 PROCEDURE — 29580 STRAPPING UNNA BOOT: CPT

## 2019-02-05 NOTE — WOUND CARE
02/05/19 1442 Wound Leg Lower Right;Lateral  
Date First Assessed/Time First Assessed: 11/14/18 0830   POA: Yes  Wound Type: Pressure injury  Location: Leg Lower  Orientation: Right;Lateral  
Dressing Status  (patient make cut in unna boot as it wax too tight) Dressing Type  Unna boot Condition of Base (hypergranulation, pink, slough) Condition of Edges Open Tissue Type Pink;Red;Yellow Drainage Amount  Small Drainage Color Serosanguinous Wound Odor None Cleansing and Cleansing Agents  Normal saline Dressing Type Applied (acticoat / 4 x 4s, calamine UNNA boot)  
nurse visit to remove and reapply [employer], discharged from wound clinic, has apt on Friday with physician.

## 2019-02-08 ENCOUNTER — HOSPITAL ENCOUNTER (OUTPATIENT)
Dept: WOUND CARE | Age: 59
Discharge: HOME | End: 2019-02-08
Payer: COMMERCIAL

## 2019-02-08 VITALS
DIASTOLIC BLOOD PRESSURE: 81 MMHG | HEART RATE: 68 BPM | TEMPERATURE: 98.1 F | SYSTOLIC BLOOD PRESSURE: 165 MMHG | RESPIRATION RATE: 18 BRPM

## 2019-02-08 PROCEDURE — 74011000250 HC RX REV CODE- 250: Performed by: OTOLARYNGOLOGY

## 2019-02-08 PROCEDURE — 11042 DBRDMT SUBQ TIS 1ST 20SQCM/<: CPT

## 2019-02-08 RX ORDER — LIDOCAINE 40 MG/G
CREAM TOPICAL AS NEEDED
Status: DISCONTINUED | OUTPATIENT
Start: 2019-02-08 | End: 2019-02-12 | Stop reason: HOSPADM

## 2019-02-08 RX ADMIN — LIDOCAINE: 40 CREAM TOPICAL at 08:00

## 2019-02-08 NOTE — WOUND CARE
02/08/19 8259 Wound Leg Lower Right;Lateral  
Date First Assessed/Time First Assessed: 11/14/18 0830   POA: Yes  Wound Type: Pressure injury  Location: Leg Lower  Orientation: Right;Lateral  
Dressing Status  Removed Dressing Type  Unna boot Non-Pressure Injury Full thickness (subcut/muscle) Wound Length (cm) 5.5 cm Wound Width (cm) 3.6 cm Wound Depth (cm) 1 Wound Surface area (cm^2) 19.8 cm^2 Change in Wound Size % 65.62 Condition of Base Granulation; Hypergranulation;Pink Condition of Edges Open Tissue Type Pink;Red Drainage Amount  Moderate Drainage Color Serosanguinous Wound Odor None Cleansing and Cleansing Agents  Normal saline Visit Vitals /81 (BP 1 Location: Left arm, BP Patient Position: At rest) Pulse 68 Temp 98.1 °F (36.7 °C) Resp 18

## 2019-02-08 NOTE — WOUND CARE
02/08/19 5486 Wound Leg Lower Right;Lateral  
Date First Assessed/Time First Assessed: 11/14/18 0830   POA: Yes  Wound Type: Pressure injury  Location: Leg Lower  Orientation: Right;Lateral  
Dressing Type Applied (acticoat flex 7, exudry, UNNA boot) Discharged from wound clinic, ambulatory, denies pain. . To return in 1 week.

## 2019-02-11 NOTE — PROGRESS NOTES
Inform patient INR is therapeutic. Confirm current dose of warfarin 4 mg tablets 0.5 tabs on Mon and Fri and 1 tab the other 5 days. . No change in warfarin dose. Repeat in one month. Kidney test is stable. CBC and TSH were to have been done yesterday, but it looks like they were not. We can add TSH, but not sure if CBC can be done from tube for protime. If so add both. Orders are in system. [FreeTextEntry1] : Pt presents to office for f/u of prolapse. reports feeling prolapse past pessary.

## 2019-02-15 ENCOUNTER — HOSPITAL ENCOUNTER (OUTPATIENT)
Dept: WOUND CARE | Age: 59
Discharge: HOME | End: 2019-02-15
Payer: COMMERCIAL

## 2019-02-15 VITALS
SYSTOLIC BLOOD PRESSURE: 144 MMHG | DIASTOLIC BLOOD PRESSURE: 79 MMHG | HEART RATE: 86 BPM | RESPIRATION RATE: 16 BRPM | TEMPERATURE: 97.5 F

## 2019-02-15 PROCEDURE — 11042 DBRDMT SUBQ TIS 1ST 20SQCM/<: CPT

## 2019-02-15 PROCEDURE — 74011000250 HC RX REV CODE- 250: Performed by: OTOLARYNGOLOGY

## 2019-02-15 RX ORDER — LIDOCAINE 40 MG/G
CREAM TOPICAL AS NEEDED
Status: DISCONTINUED | OUTPATIENT
Start: 2019-02-15 | End: 2019-02-19 | Stop reason: HOSPADM

## 2019-02-15 RX ADMIN — LIDOCAINE: 40 CREAM TOPICAL at 08:45

## 2019-02-15 NOTE — WOUND CARE
02/15/19 0840 Wound Leg Lower Right;Lateral  
Date First Assessed/Time First Assessed: 11/14/18 0830   POA: Yes  Wound Type: Pressure injury  Location: Leg Lower  Orientation: Right;Lateral  
Dressing Status  Removed Dressing Type  (ACTICOAT, Artice Reed) Non-Pressure Injury Full thickness (subcut/muscle) Wound Length (cm) 4.9 cm Wound Width (cm) 3.2 cm Wound Depth (cm) 0.1 Wound Surface area (cm^2) 15.68 cm^2 Change in Wound Size % 72.78 Condition of Base Granulation Condition of Edges Open Tissue Type Pink;Red Drainage Amount  Moderate Drainage Color Serosanguinous Wound Odor None Cleansing and Cleansing Agents  Chlorhexidine gluconate 4% Visit Vitals /79 (BP 1 Location: Left arm) Pulse 86 Temp 97.5 °F (36.4 °C) Resp 16

## 2019-02-15 NOTE — PROGRESS NOTES
[facility] 
WOUND CARE PROGRESS NOTE Name:  Caleb Fuentes 
MR#:  [id number] :  1960 ACCOUNT #:  [de-identified] ADMIT DATE:  02/15/2019 DISCHARGE DATE: 
 
SUBJECTIVE:  The patient returns for treatment of a posttraumatic wound to her right 
leg, S81.811D, which is a venous leg ulcer, I87.311, which goes down to subcutaneous 
tissue L97.912, in a type 2 diabetic E11.622. The patient had a good week. We continued Acticoat Flex 7 and an Unna boot. She has 
been using 15 to 20 mmHg stocking on the left leg. She had no problems. No changes 
in her medications, allergies or review of systems. OBJECTIVE: 
VITAL SIGNS:  Her temperature today is 97.5, pulse 86, respirations 16, blood 
pressure 144/79. WOUND EXAMINATION:  The wound dimensions are improved at 4.9 x 3.2 x 0.1 cm. There 
is slough between the areas of hypergranulation tissue as well as residual 
hypergranulation tissue. It is recommended that the wound be debrided of all slough 
and hypergranulation tissue be cauterized with silver nitrate to try to flatten out 
the wound. I explained the risks and benefits, the patient understands and wishes to 
proceed. Therefore, topical Xylocaine was applied for 10 minutes. With the use of a 
5 mm ring curette, the subcutaneous debridement was achieved to remove all residual 
slough down to subcutaneous tissue. Hemostasis was achieved with pressure until dry. All the hypergranulation tissue was then cauterized with Silver nitrate to create 
involution. This was then irrigated clear with saline. The wound was then scrubbed 
with Hibiclens followed by normal saline. ASSESSMENT AND PLAN:  We are going to continue Acticoat Flex 7 and Unna boot this 
week. She will continue using her stocking on the left side. She will keep the legs 
elevated as much as possible. She would do gastrocnemius muscle exercises to improve venous outflow. She knows that next week we might switch her to Mepilex sliver foam 
if the hypergranulation tissue persists. On the other hand, if the dimensions of the 
wound continue to improve, we will stay with the Acticoat. All questions were 
answered. Her condition on discharge is stable. Randi Harvey MD 
 
 
[id number] 
D:  02/15/2019 9:17 
T:  02/15/2019 9:59 
JOB #:  [id number]

## 2019-02-15 NOTE — WOUND CARE
02/15/19 0840 Wound Leg Lower Right;Lateral  
Date First Assessed/Time First Assessed: 11/14/18 0830   POA: Yes  Wound Type: Pressure injury  Location: Leg Lower  Orientation: Right;Lateral  
Dressing Status  Removed Dressing Type  (ACTICOAT, Anastasiya Kyler) Non-Pressure Injury Full thickness (subcut/muscle) Wound Length (cm) 4.9 cm Wound Width (cm) 3.2 cm Wound Depth (cm) 0.1 Wound Surface area (cm^2) 15.68 cm^2 Change in Wound Size % 72.78 Condition of Base Granulation Condition of Edges Open Tissue Type Pink;Red Drainage Amount  Moderate Drainage Color Serosanguinous Wound Odor None Cleansing and Cleansing Agents  Chlorhexidine gluconate 4% Dressing Type Applied (Acticoat, Exudry, unna boot) Wound Procedure Type Debridement- Surgical  
Procedure Time Out 4384 Consent Obtained  Yes Procedure Bleeding Minimal  
Procedure Hemostasis  Pressure Procedure Instrument  Curette Post-Procedure Length (cm) 4.9 cm Post-Procedure Width (cm) 3.2 cm Post-Procedure Depth (cm) 0.1 cm Post-Procedure Volume (cm^3) 1.57 cm^3 Post-Procedure Surface Area (cm^2) 15.68 cm^2 Procedure Tolerated Well Visit Vitals /79 (BP 1 Location: Left arm) Pulse 86 Temp 97.5 °F (36.4 °C) Resp 16 Discharged to home ambulatory, denies pain. Return to clinic in one week.

## 2019-02-20 ENCOUNTER — TELEPHONE (OUTPATIENT)
Dept: INTERNAL MEDICINE CLINIC | Facility: CLINIC | Age: 59
End: 2019-02-20

## 2019-02-20 DIAGNOSIS — Z79.01 LONG TERM (CURRENT) USE OF ANTICOAGULANTS: ICD-10-CM

## 2019-02-22 ENCOUNTER — TELEPHONE (OUTPATIENT)
Dept: INTERNAL MEDICINE CLINIC | Facility: CLINIC | Age: 59
End: 2019-02-22

## 2019-02-22 ENCOUNTER — HOSPITAL ENCOUNTER (OUTPATIENT)
Dept: WOUND CARE | Age: 59
Discharge: HOME | End: 2019-02-22
Payer: COMMERCIAL

## 2019-02-22 VITALS
SYSTOLIC BLOOD PRESSURE: 137 MMHG | RESPIRATION RATE: 16 BRPM | HEART RATE: 61 BPM | DIASTOLIC BLOOD PRESSURE: 71 MMHG | TEMPERATURE: 97.7 F

## 2019-02-22 DIAGNOSIS — I48.0 PAF (PAROXYSMAL ATRIAL FIBRILLATION) (HCC): Primary | ICD-10-CM

## 2019-02-22 LAB
BUN SERPL-MCNC: 25 MG/DL (ref 6–24)
BUN/CREAT SERPL: 16 (ref 9–23)
CALCIUM SERPL-MCNC: 9.1 MG/DL (ref 8.7–10.2)
CHLORIDE SERPL-SCNC: 105 MMOL/L (ref 96–106)
CO2 SERPL-SCNC: 22 MMOL/L (ref 20–29)
CREAT SERPL-MCNC: 1.52 MG/DL (ref 0.57–1)
EST. AVERAGE GLUCOSE BLD GHB EST-MCNC: 174 MG/DL
GLUCOSE SERPL-MCNC: 132 MG/DL (ref 65–99)
HBA1C MFR BLD: 7.7 % (ref 4.8–5.6)
INR PPP: 2.8 (ref 0.8–1.2)
POTASSIUM SERPL-SCNC: 4.7 MMOL/L (ref 3.5–5.2)
PROTHROMBIN TIME: 27.6 SEC (ref 9.1–12)
SODIUM SERPL-SCNC: 140 MMOL/L (ref 134–144)

## 2019-02-22 PROCEDURE — 11042 DBRDMT SUBQ TIS 1ST 20SQCM/<: CPT

## 2019-02-22 PROCEDURE — 74011000250 HC RX REV CODE- 250: Performed by: OTOLARYNGOLOGY

## 2019-02-22 RX ORDER — LIDOCAINE 40 MG/G
CREAM TOPICAL AS NEEDED
Status: DISCONTINUED | OUTPATIENT
Start: 2019-02-22 | End: 2019-02-26 | Stop reason: HOSPADM

## 2019-02-22 RX ADMIN — LIDOCAINE: 40 CREAM TOPICAL at 08:36

## 2019-02-22 NOTE — WOUND CARE
02/22/19 0830 Wound Leg Lower Right;Lateral  
Date First Assessed/Time First Assessed: 11/14/18 0830   POA: Yes  Wound Type: Pressure injury  Location: Leg Lower  Orientation: Right;Lateral  
Dressing Status  Removed Dressing Type  Unna boot 
(acticoat flex 7) Wound Length (cm) 5 cm Wound Width (cm) 3 cm Wound Depth (cm) 0.1 Wound Surface area (cm^2) 15 cm^2 Change in Wound Size % 73.96 Condition of Base Granulation;Slough Condition of Edges Open Drainage Amount  Moderate Drainage Color Tan Wound Odor None Periwound Skin Condition Intact; Macerated Cleansing and Cleansing Agents  Normal saline Visit Vitals /71 Pulse 61 Temp 97.7 °F (36.5 °C) Resp 16 RLE Peripheral Vascular Capillary Refill: Less than/equal to 3 seconds (02/22/19 0829) Color: Appropriate for race (02/22/19 1083) Temperature: Warm (02/22/19 0829) Sensation: Present (02/22/19 4470) Pedal Pulse: Palpable (02/22/19 0829) Circumference of Calf (cm): 43.5 cm (02/22/19 0829) Location of Measurement (Calf): Mid  (02/22/19 0829) Circumference of Ankle (cm): 22 cm (02/22/19 0829) Location of Measurement (Ankle): Upper  (02/22/19 0829)

## 2019-02-22 NOTE — PROGRESS NOTES
[address] 
WOUND CARE PROGRESS NOTE Name:  Marek Whalen 
MR#:  [id number] :  1960 ACCOUNT #:  [de-identified] ADMIT DATE:  2019 DISCHARGE DATE: 
 
SUBJECTIVE:  The patient returns for treatment of post-traumatic wound of the right leg, S81.811D, with a venous hypertension ulcer I87.311, which goes down to subcutaneous tissue L97.912 in a type 2 diabetic E11.622. The patient has had a good week. Her blood sugars have been running about 180 which is in a comfortable range for her, but not adequate from our standpoint for healing. She has been using 15-20 mmHg stocking on the left side. She has been trying to minimize her sodium intake and has had a good week without any problems. No changes in her medications, allergies or review of systems. EXAMINATION:  Her temperature today is 97.7, pulse 61, respirations 16, blood pressure 137/71. WOUND EXAMINATION:  The wound dimensions today are 5 x 3 x 0.1 cm. There are islands of healthy granulation tissue, along with hypergranulation tissue and areas of slough down to subcutaneous tissue. It is recommended that the wound be debrided. I explained the risks and benefits. The patient understands and wishes to proceed. Therefore, topical Xylocaine gel was applied for 10 minutes. Using a 5-mm ring curette, all devitalized tissue was removed down to healthy subcutaneous tissue. Hypergranulation tissue was taken down and plane down to healthy normal granulation tissue. Upon completion of the debridement, nice healthy granulation tissue was present. Nice healthy subcutaneous tissue down through fat was evident and there was no evidence of remaining slough. Hemostasis was achieved with pressure until dry. The wound was then scrubbed with Hibiclens followed by normal saline.  
 
ASSESSMENT AND PLAN:  For this week, we will continue the Acticoat Flex 7 along with leg elevation and gastrocnemius muscle exercises. If the wound remains hypergranular next week, we will switch to Mepilex Ag foam.  I explained to the patient that I do not want to change too many variables at once, so I want her to do everything she can to minimize sodium, to elevate her legs and to the exercises and if the appearance of the wound is not adequate next time, we will make this dressing change at that time. She understands the plan. All questions were answered. Condition on discharge is stable. MD SHEEBA Galan/[id number] 
D:  02/22/2019 9:23 T:  02/22/2019 9:53 JOB #:  [id number]

## 2019-02-22 NOTE — TELEPHONE ENCOUNTER
Inform patient INR is therapeutic. Confirm current dose of warfarin 4 mg tablets 1 tablet on Mondays and a half tablet the other 6 days . No change in warfarin dose. Repeat in one month. Kidney test continues to improve dropping from 2.01 in November to 1.87 in November to current value of 1.52. Hemoglobin A1c (the 3 month sugar test)  Has improved from 8.4 3 months ago to 7.7. Our goal is less than 7.5. Patient also informed in My Chart.

## 2019-02-23 RX ORDER — BUMETANIDE 1 MG/1
TABLET ORAL
Qty: 60 TAB | Refills: 0 | Status: SHIPPED | OUTPATIENT
Start: 2019-02-23 | End: 2019-04-01 | Stop reason: SDUPTHER

## 2019-03-01 ENCOUNTER — HOSPITAL ENCOUNTER (OUTPATIENT)
Dept: WOUND CARE | Age: 59
Discharge: HOME | End: 2019-03-01
Payer: COMMERCIAL

## 2019-03-01 VITALS
RESPIRATION RATE: 16 BRPM | TEMPERATURE: 97 F | SYSTOLIC BLOOD PRESSURE: 158 MMHG | HEART RATE: 61 BPM | DIASTOLIC BLOOD PRESSURE: 83 MMHG

## 2019-03-01 PROCEDURE — 74011000250 HC RX REV CODE- 250: Performed by: OTOLARYNGOLOGY

## 2019-03-01 PROCEDURE — 11042 DBRDMT SUBQ TIS 1ST 20SQCM/<: CPT

## 2019-03-01 RX ORDER — LIDOCAINE 40 MG/G
CREAM TOPICAL AS NEEDED
Status: DISCONTINUED | OUTPATIENT
Start: 2019-03-01 | End: 2019-03-05 | Stop reason: HOSPADM

## 2019-03-01 RX ADMIN — LIDOCAINE: 40 CREAM TOPICAL at 08:31

## 2019-03-01 NOTE — PROGRESS NOTES
[address] 
WOUND CARE PROGRESS NOTE Name:  Vel Claros 
MR#:  [id number] :  1960 ACCOUNT #:  [de-identified] DATE OF SERVICE:  2019 The patient returns for treatment of a wound to the right leg which was originally due to trauma S81.811D, with a venous hypertensive ulcer I87.311, which goes down to subcutaneous tissue L97.912 in a type 2 diabetic E11.622. The patient returns having had a very good week. She had no problems. No changes in her medications, allergies, or review of systems. Her temperature today is 97, pulse 61, respirations 16, blood pressure 158/83. The wound dimensions last week was 5 x 3 x 0.1 cm. Today, the wound is smaller at 4.5 x 2.5 x 0.1 cm. The wound is covered with slough which is essentially a pseudomembrane covering the entire wound. The underlying tissue is hypergranulation tissue. It is again recommended that the wound be debrided of all slough and all hypergranulation tissue be cauterized with silver nitrate. I explained the risks and benefits. The patient understood and wished to proceed. Therefore, topical Xylocaine was applied for 10 minutes. With use of 5 mm ring curette, the entire wound was debrided down to nice healthy bleeding subcutaneous tissue. Hypergranulation tissue was evident throughout the wound. It was then cauterized lightly with silver nitrate. This was then irrigated clear with saline. The wound was then scrubbed with Hibiclens followed by normal saline. ASSESSMENT AND PLAN:  Since the wound continues to get smaller, I am going to stay with ACTICOAT Flex 7 along with the Unna boot. The patient will keep her legs elevated. She will use the 15-20 mmHg stocking on the left leg. She will do gastrocnemius muscle pump exercise hourly while awake. She will minimize her sodium intake. Next week, prior to coming in, she will remove her Unna boot at home.   She will shave her leg which she desperately wishes to do and we will give her some dressings and Tubigrip to apply before she returns in 1 week for her followup visit. All questions were answered. Her condition on discharge is stable. MD SHEEBA Pabon/[email or web address] D:  03/01/2019 8:50 T:  03/01/2019 9:47 JOB #:  [id number]

## 2019-03-01 NOTE — WOUND CARE
03/01/19 0840 Wound Leg Lower Right;Lateral  
Date First Assessed/Time First Assessed: 11/14/18 0830   POA: Yes  Wound Type: Pressure injury  Location: Leg Lower  Orientation: Right;Lateral  
Dressing Type Applied Unna boot 
(Acticoat Flex 7, exudry) Patient was discharged with Self to home and was ambulatory. In stable condition with c/o pain:_0_/10_

## 2019-03-01 NOTE — WOUND CARE
03/01/19 0825 Wound Leg Lower Right;Lateral  
Date First Assessed/Time First Assessed: 11/14/18 0830   POA: Yes  Wound Type: Pressure injury  Location: Leg Lower  Orientation: Right;Lateral  
Dressing Status  Removed Dressing Type  Unna boot 
(acticoat flex 7) Wound Length (cm) 4.5 cm Wound Width (cm) 2.5 cm Wound Depth (cm) 0.1 Wound Surface area (cm^2) 11.25 cm^2 Change in Wound Size % 80.47 Condition of Base Granulation;Slough Condition of Edges Open Drainage Amount  Moderate Drainage Color Serosanguinous Wound Odor None Periwound Skin Condition Intact Cleansing and Cleansing Agents  Soap and water;Normal saline Visit Vitals /83 Pulse 61 Temp 97 °F (36.1 °C) Resp 16 RLE Peripheral Vascular Capillary Refill: Less than/equal to 3 seconds (03/01/19 0827) Color: Appropriate for race (03/01/19 0827) Temperature: Warm (03/01/19 0827) Sensation: Present (03/01/19 0827) Pedal Pulse: Palpable (03/01/19 0827) Circumference of Calf (cm): 41 cm (03/01/19 0827) Location of Measurement (Calf): Mid  (03/01/19 0827) Circumference of Ankle (cm): 22 cm (03/01/19 0827) Location of Measurement (Ankle): Upper  (03/01/19 0827)

## 2019-03-04 RX ORDER — SYRING-NEEDL,DISP,INSUL,0.3 ML 30 GX5/16"
SYRINGE, EMPTY DISPOSABLE MISCELLANEOUS
Qty: 100 SYRINGE | Refills: 0 | Status: SHIPPED | OUTPATIENT
Start: 2019-03-04 | End: 2019-08-09

## 2019-03-04 RX ORDER — CLONIDINE HYDROCHLORIDE 0.3 MG/1
TABLET ORAL
Qty: 60 TAB | Refills: 11 | Status: SHIPPED | OUTPATIENT
Start: 2019-03-04 | End: 2019-08-09 | Stop reason: DRUGHIGH

## 2019-03-04 NOTE — TELEPHONE ENCOUNTER
REFILL     PCP: Diamante Gatica MD     Last appt: 2/21/2019   Future Appointments   Date Time Provider Gómez Lechuga   3/8/2019  8:15 AM [facility] WOUND CARE 2 [facility]   3/11/2019  8:45 AM Diamante Gatica  [facility] [place] [facility]   3/15/2019  8:30 AM [facility] WOUND CARE 2 [facility]   3/21/2019  3:45 PM LAB [facility] BSIMA KAMINI SCHED        Requested Prescriptions     Pending Prescriptions Disp Refills    cloNIDine HCl (CATAPRES) 0.3 mg tablet 60 Tab 11

## 2019-03-04 NOTE — TELEPHONE ENCOUNTER
Refill     Requested Prescriptions     Pending Prescriptions Disp Refills    cloNIDine HCl (CATAPRES) 0.3 mg tablet 60 Tab 11

## 2019-03-08 ENCOUNTER — HOSPITAL ENCOUNTER (OUTPATIENT)
Dept: WOUND CARE | Age: 59
Discharge: HOME | End: 2019-03-08
Payer: COMMERCIAL

## 2019-03-08 VITALS
DIASTOLIC BLOOD PRESSURE: 77 MMHG | RESPIRATION RATE: 16 BRPM | TEMPERATURE: 97.9 F | SYSTOLIC BLOOD PRESSURE: 149 MMHG | HEART RATE: 90 BPM

## 2019-03-08 PROCEDURE — 11042 DBRDMT SUBQ TIS 1ST 20SQCM/<: CPT

## 2019-03-08 PROCEDURE — 74011000250 HC RX REV CODE- 250: Performed by: OTOLARYNGOLOGY

## 2019-03-08 RX ORDER — LIDOCAINE 40 MG/G
CREAM TOPICAL AS NEEDED
Status: DISCONTINUED | OUTPATIENT
Start: 2019-03-08 | End: 2019-03-12 | Stop reason: HOSPADM

## 2019-03-08 RX ADMIN — LIDOCAINE: 40 CREAM TOPICAL at 08:37

## 2019-03-08 NOTE — WOUND CARE
03/08/19 0835   Wound Leg Lower Right;Lateral   Date First Assessed/Time First Assessed: 11/14/18 0830   POA: Yes  Wound Type: Pressure injury  Location: Leg Lower  Orientation: Right;Lateral   Dressing Status  Removed   Dressing Type  Unna boot  (Acticoat flex 7)   Wound Length (cm) 4.5 cm   Wound Width (cm) 2.5 cm   Wound Depth (cm) 0.1   Wound Surface area (cm^2) 11.25 cm^2   Change in Wound Size % 80.47   Condition of Base Granulation; Hypergranulation;Slough   Condition of Edges Open   Drainage Amount  Moderate   Drainage Color Serosanguinous   Wound Odor None   Periwound Skin Condition Intact   Cleansing and Cleansing Agents  Soap and water;Normal saline   Dressing Type Applied (Acticoat, exudry, unna boot)   Wound Procedure Type Debridement- Surgical   Procedure Time Out 0840   Consent Obtained  Yes   Procedure Bleeding Minimal   Procedure Hemostasis  Pressure   Procedure Instrument  Curette   Post-Procedure Length (cm) 4.5 cm   Post-Procedure Width (cm) 2.5 cm   Post-Procedure Depth (cm) 0.1 cm   Post-Procedure Volume (cm^3) 1.12 cm^3   Post-Procedure Surface Area (cm^2) 11.25 cm^2   Procedure Tolerated Well   Discharged from wound center, ambulatory, denies pain. . 0/10. Has appointment to return in 1 week.

## 2019-03-08 NOTE — PROGRESS NOTES
[facility]  WOUND CARE PROGRESS NOTE    Name:  Alexandra Moreland  MR#:  [id number]  :  1960  ACCOUNT #:  [de-identified]  DATE OF SERVICE:  2019    The patient returns for treatment of a wound to the right lower extremity which was initially due to a laceration, S81.811D, with a venous hypertension ulcer, I87.311, which goes down to subcutaneous tissue, L97.912, in a type 2 diabetic, E11.622. Last week, we noticed more granulation tissue filling the wound. This was addressed with debridement and cauterization with silver nitrate. She had a good week and denies any problems, nor changes in her medications, allergies, or review of systems. On 2019, she was prescribed clonidine 0.3 mg for her hypertension. Her temperature today is 97.9, pulse 90, respirations 16, blood pressure 149/77. The external wound dimensions are unchanged at 4.5 x 2.5 x 0.1 cm. That said, the wound is starting to pinch together in the distal aspect of the wound. The wound still has hypergranulation tissue. It has devitalized tissue between those areas and it is recommended that the wound be debrided. I explained the risks and benefits. The patient understood and wished to proceed. Therefore, topical Xylocaine was applied for 10 minutes. With use of a 5 mm ring curette, the wound was debrided down to nice healthy bleeding subcutaneous tissue. Hemostasis was achieved with gentle pressure until dry. All exuberant granulation tissue was then cauterized with silver nitrate. This was irrigated clear with saline. The wound was scrubbed with Hibiclens followed by normal saline. ASSESSMENT AND PLAN:  We are going to continue ACTICOAT Flex 7 and an Unna boot. The patient will continue to use her compression stocking on the left leg where she has a small laceration from her finger. She is going to continue to keep her leg elevated.   She will continue to do gastrocnemius muscle exercises hourly while awake and I will see her again in followup in 1 week. All questions were answered. Her condition on discharge is stable.         Gregorio Guzman MD      [email or web address]  D:  03/08/2019 9:10  T:  03/08/2019 9:50  JOB #:  [id number]

## 2019-03-08 NOTE — WOUND CARE
03/08/19 0835   Wound Leg Lower Right;Lateral   Date First Assessed/Time First Assessed: 11/14/18 0830   POA: Yes  Wound Type: Pressure injury  Location: Leg Lower  Orientation: Right;Lateral   Dressing Status  Removed   Dressing Type  Unna boot  (Acticoat flex 7)   Wound Length (cm) 4.5 cm   Wound Width (cm) 2.5 cm   Wound Depth (cm) 0.1   Wound Surface area (cm^2) 11.25 cm^2   Change in Wound Size % 80.47   Condition of Base Granulation; Hypergranulation;Slough   Condition of Edges Open   Drainage Amount  Moderate   Drainage Color Serosanguinous   Wound Odor None   Periwound Skin Condition Intact   Cleansing and Cleansing Agents  Soap and water;Normal saline     Visit Vitals  /77   Pulse 90   Temp 97.9 °F (36.6 °C)   Resp 16        RLE Peripheral Vascular   Capillary Refill: Less than/equal to 3 seconds (03/08/19 0834)  Color: Appropriate for race (03/08/19 0834)  Temperature: Warm (03/08/19 4468)  Sensation: Present (03/08/19 0834)  Pedal Pulse: Palpable (03/08/19 0834)  Circumference of Calf (cm): 40.5 cm (03/08/19 0834)  Location of Measurement (Calf): Mid  (03/08/19 0834)  Circumference of Ankle (cm): 22 cm (03/08/19 0834)  Location of Measurement (Ankle): Upper  (03/08/19 0834)

## 2019-03-11 ENCOUNTER — OFFICE VISIT (OUTPATIENT)
Dept: INTERNAL MEDICINE CLINIC | Facility: CLINIC | Age: 59
End: 2019-03-11

## 2019-03-11 VITALS
OXYGEN SATURATION: 97 % | HEART RATE: 76 BPM | WEIGHT: 269.5 LBS | SYSTOLIC BLOOD PRESSURE: 172 MMHG | BODY MASS INDEX: 39.92 KG/M2 | HEIGHT: 69 IN | DIASTOLIC BLOOD PRESSURE: 72 MMHG | RESPIRATION RATE: 14 BRPM | TEMPERATURE: 97.4 F

## 2019-03-11 DIAGNOSIS — E11.3522: ICD-10-CM

## 2019-03-11 DIAGNOSIS — E78.2 MIXED HYPERLIPIDEMIA: ICD-10-CM

## 2019-03-11 DIAGNOSIS — I83.891 VENOUS STASIS ULCER OF RIGHT LOWER LEG WITH EDEMA OF RIGHT LOWER LEG (HCC): ICD-10-CM

## 2019-03-11 DIAGNOSIS — I10 ESSENTIAL HYPERTENSION: ICD-10-CM

## 2019-03-11 DIAGNOSIS — E11.42 DIABETIC POLYNEUROPATHY ASSOCIATED WITH TYPE 2 DIABETES MELLITUS (HCC): ICD-10-CM

## 2019-03-11 DIAGNOSIS — R60.9 VENOUS STASIS ULCER OF RIGHT LOWER LEG WITH EDEMA OF RIGHT LOWER LEG (HCC): ICD-10-CM

## 2019-03-11 DIAGNOSIS — I49.5 SICK SINUS SYNDROME (HCC): ICD-10-CM

## 2019-03-11 DIAGNOSIS — N18.4 ANEMIA IN STAGE 4 CHRONIC KIDNEY DISEASE (HCC): ICD-10-CM

## 2019-03-11 DIAGNOSIS — Z79.01 LONG TERM (CURRENT) USE OF ANTICOAGULANTS: ICD-10-CM

## 2019-03-11 DIAGNOSIS — Z13.31 SCREENING FOR DEPRESSION: ICD-10-CM

## 2019-03-11 DIAGNOSIS — I48.0 PAF (PAROXYSMAL ATRIAL FIBRILLATION) (HCC): ICD-10-CM

## 2019-03-11 DIAGNOSIS — E66.01 MORBID OBESITY WITH BMI OF 40.0-44.9, ADULT (HCC): ICD-10-CM

## 2019-03-11 DIAGNOSIS — I83.019 VENOUS STASIS ULCER OF RIGHT LOWER LEG WITH EDEMA OF RIGHT LOWER LEG (HCC): ICD-10-CM

## 2019-03-11 DIAGNOSIS — D63.1 ANEMIA IN STAGE 4 CHRONIC KIDNEY DISEASE (HCC): ICD-10-CM

## 2019-03-11 DIAGNOSIS — L97.919 VENOUS STASIS ULCER OF RIGHT LOWER LEG WITH EDEMA OF RIGHT LOWER LEG (HCC): ICD-10-CM

## 2019-03-11 NOTE — LETTER
NOTIFICATION RETURN TO WORK  
 
3/11/2019 9:18 AM 
 
Ms. Clark Sarah [facility] [id number] To Whom It May Concern: 
 
Clark Sarah is currently under the care of [address] [place] [address]. She was seen in the office today and will return to work on Tuesday March 12. If there are questions or concerns please have the patient contact our office.  
 
 
 
Sincerely, 
 
 
Srinivas Christie MD

## 2019-03-11 NOTE — PROGRESS NOTES
HISTORY OF PRESENT ILLNESS  Emeka Morgan is a 60 y.o. female. HPI  She presents for follow up of diabetes mellitus with CKD, retinopathy and neuropathy, hypertension, hyperlipidemia, Atrial Fibrillation, SSS with PM, obesity and venous stasis. Has lost 10 lbs since November. Diet and Lifestyle: generally follows a low fat low cholesterol diet, generally follows a low sodium diet, follows a diabetic diet regularly, exercises sporadically, nonsmoker  Diabetic ROS - medication compliance: compliant most of the time,      home glucose monitoring: non-fasting around 150     home BP Monitoring: not done. Did not take medication this morning. Was 149/77 at [facility] care on Mar 8.     further diabetic ROS: no polyuria or polydipsia, no unusual visual symptoms, no hypoglycemia, no medication side effects noted, has dysesthesias in the feet. Cardiovascular ROS:  She complains of lower extremity edema. , but less than it has been   She denies palpitations, orthopnea, exertional chest pressure/discomfort, claudication, dyspnea on exertion, dizziness    3 most recent PHQ Screens 3/11/2019   Little interest or pleasure in doing things Not at all   Feeling down, depressed, irritable, or hopeless Not at all   Total Score PHQ 2 0       Patient Active Problem List   Diagnosis Code    History of breast cancer Z85.3    Asthma J45.909    Fe deficiency anemia D50.9    Status post ablation of atrial fibrillation Z98.890, Z86.79    Sick sinus syndrome (HCC) I49.5    S/P cardiac pacemaker procedure Z95.0    Long term (current) use of anticoagulants Z79.01    Mixed hyperlipidemia E78.2    CKD (chronic kidney disease), stage IV ([address] H57.1    Systolic murmur U87.9    Essential hypertension I10    PAF [facility]paroxysmal atrial fibrillation) ([facility]) I48.0    Anemia in stage 4 chronic kidney disease (HCC) N18.4, D63.1    Uncontrolled type 2 diabetes mellitus with stage 4 chronic kidney disease, with long-term current use of insulin ([address] E11.22, E11.65, N18.4, Z79.4    Morbid obesity with BMI of 40.0-44.9, adult ([facility]) E66.01, Z68.41    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, associated with type 2 diabetes mellitus ([facility] X51.9833    Diabetic polyneuropathy associated with type 2 diabetes mellitus ([facility]) E11.42    Venous stasis ulcer of right lower leg with edema of right lower leg ([facility]) I83.019, I83.891, L97.919, R60.9    Laceration of right leg excluding thigh, subsequent encounter S81.819A     Past Medical History:   Diagnosis Date    Acquired lymphedema     Right arm    Arrhythmia     Asthma     Atrial fibrillation ([facility])     Dr. Berenice Schultz and Dr. Mollie Eisenmenger Atrial flutter ([facility]     Cancer [facility])     bilateral breast    Chronic kidney disease     Diabetes mellitus type II     Dizziness     Essential hypertension     Hyperlipidemia     Hypertension     Long term (current) use of anticoagulants     Morbid obesity ([facility] 3/14/2012    Osteoarthritis     Pacemaker     Sick sinus syndrome ([facility]      Past Surgical History:   Procedure Laterality Date    ABDOMEN SURGERY PROC UNLISTED      gastric bypass    GASTRIC BYPASS,OBESE<150CM SAW-EN-Y  7/08    [facility]    HX AFIB ABLATION      HX CARPAL TUNNEL RELEASE      HX CERVICAL FUSION  1994    HX DILATION AND CURETTAGE  1992    HX MASTECTOMY  1998    RIGHT MODIFIED RADICAL     HX MOHS PROCEDURES  1995    right    HX ORTHOPAEDIC      ight knee surgery    HX PACEMAKER      IR INSERT TUNL CVC W PORT OVER 5 YEARS      LAMINECTOMY,CERVICAL  1994    NEEDLE BIOPSY LIVER,W OTHR [address]  8.21.07    WI ANESTH,KNEE AREA SURGERY  1982 AND 1994    WI TRABECULOPLASTY BY LASER SURGERY      US GUIDE ASP OVARIAN CYST ABD APPROACH  8.21.07    RIGHT      Social History     Socioeconomic History    Marital status:      Spouse name: Not on file    Number of children: Not on file    Years of education: Not on file    Highest education level: Not on file   Tobacco Use    Smoking status: Never Smoker    Smokeless tobacco: Never Used   Substance and Sexual Activity    Alcohol use: Yes     Comment: rare    Drug use: No     Family History   Problem Relation Age of Onset    Cancer Mother     Heart Disease Mother     Alcohol abuse Father     Diabetes Brother     Hypertension Brother      Allergies   Allergen Reactions    Ace Inhibitors Cough    Amlodipine Swelling    Avapro [Irbesartan] Unable to Obtain    Bactrim [Sulfamethoxazole-Trimethoprim] Other (comments)     Sore mouth    Captopril Cough    Carvedilol Diarrhea    Contrast Agent [Iodine] Itching    Fish Containing Products Hives    Morphine Nausea and Vomiting and Swelling    Penicillins Hives    Pravachol [Pravastatin] Nausea Only    Seafood [Shellfish Containing Products] Hives    Singulair [Montelukast] Other (comments)     palpitations     Current Outpatient Medications   Medication Sig Dispense Refill    cloNIDine HCl (CATAPRES) 0.3 mg tablet TAKE ONE TABLET BY MOUTH TWICE A DAY 60 Tab 11    TRUEPLUS INSULIN 0.5 mL 31 gauge x 5/16\" syrg USE ONE SYRINGE DAILY AS NEEDED. 100 Syringe 0    bumetanide (BUMEX) 1 mg tablet TAKE ONE TABLET BY MOUTH TWICE A DAY 60 Tab 0    hydrALAZINE (APRESOLINE) 100 mg tablet TAKE ONE TABLET BY MOUTH THREE TIMES A DAY 90 Tab 2    warfarin (COUMADIN) 4 mg tablet Take 1 tablet on Mondays and a half tablet the other 6 days. 45 Tab 3    sertraline (ZOLOFT) 50 mg tablet TAKE ONE AND ONE-HALF (1 & 1/2) TABLET BY MOUTH DAILY 45 Tab 4    insulin glargine (LANTUS U-100 INSULIN) 100 unit/mL injection 20 Units by SubCUTAneous route daily.  10 mL 5    metoprolol succinate (TOPROL-XL) 100 mg tablet TAKE TWO TABLETS BY MOUTH DAILY 60 Tab 10    doxazosin (CARDURA) 4 mg tablet TAKE ONE TABLET BY MOUTH ONCE NIGHTLY 30 Tab 11    busPIRone (BUSPAR) 10 mg tablet Take one po twice daily 60 Tab 11    potassium chloride SR (KLOR-CON 10) 10 mEq tablet TAKE ONE TABLET BY MOUTH DAILY 30 Tab 11    metolazone (ZAROXOLYN) 5 mg tablet Take 1 Tab by mouth daily as needed (take if develop increased LE edema, weight gain > 5 pounds. ). 30 Tab 1    cholecalciferol, VITAMIN D3, (VITAMIN D3) 5,000 unit tab tablet Take 5,000 Units by mouth daily.  vitamin A 8,000 unit capsule Take 8,000 Units by mouth daily.  ACETAMINOPHEN/DIPHENHYDRAMINE (TYLENOL PM PO) Take 2 Tabs by mouth nightly as needed.  insulin lispro (HUMALOG) 100 unit/mL kwikpen 2 units with dinner for sugars over 200 1 Package 0    cyanocobalamin (VITAMIN B-12) 1,000 mcg sublingual tablet Take 1,000 mcg by mouth daily.  calcium carbonate (TUMS) 200 mg calcium (500 mg) Chew Take 1 Tab by mouth three (3) times daily (after meals). Facility-Administered Medications Ordered in Other Visits   Medication Dose Route Frequency Provider Last Rate Last Dose    lidocaine (XYLOCAINE) 4 % cream   Topical PRN Mohit Arriaga MD           Review of Systems   Constitutional: Negative for malaise/fatigue and weight loss. Gastrointestinal: Negative for constipation and diarrhea. Musculoskeletal: Positive for joint pain (knees). Negative for back pain. Neurological: Negative for focal weakness. Visit Vitals  /72 (BP 1 Location: Left arm, BP Patient Position: Sitting)   Pulse 76   Temp 97.4 °F (36.3 °C) (Oral)   Resp 14   Ht 5' 9\" (1.753 m)   Wt 269 lb 8 oz (122.2 kg)   SpO2 97%   BMI 39.80 kg/m²     Physical Exam   Constitutional: She is oriented to person, place, and time. She appears well-developed and well-nourished. HENT:   Head: Normocephalic and atraumatic. Eyes: Conjunctivae are normal. Pupils are equal, round, and reactive to light. Neck: Neck supple. Carotid bruit is not present. No thyromegaly present. Cardiovascular: Normal rate and normal heart sounds. An irregularly irregular rhythm present. PMI is not displaced. Exam reveals no gallop.    No murmur heard.  Pulses:       Dorsalis pedis pulses are 2+ on the right side, and 2+ on the left side. Posterior tibial pulses are 2+ on the right side, and 2+ on the left side. Pulmonary/Chest: Effort normal. She has no wheezes. She has no rhonchi. She has no rales. Abdominal: Soft. Normal appearance. She exhibits no abdominal bruit and no mass. There is no hepatosplenomegaly. There is no tenderness. Musculoskeletal: She exhibits no edema. Lymphadenopathy:     She has no cervical adenopathy. Right: No supraclavicular adenopathy present. Left: No supraclavicular adenopathy present. Neurological: She is alert and oriented to person, place, and time. No sensory deficit. Skin: Skin is warm, dry and intact. No rash noted. Psychiatric: She has a normal mood and affect. Her behavior is normal.   Nursing note and vitals reviewed. Results for orders placed or performed in visit on 95/24/61   METABOLIC PANEL, BASIC   Result Value Ref Range    Glucose 132 (H) 65 - 99 mg/dL    BUN 25 (H) 6 - 24 mg/dL    Creatinine 1.52 (H) 0.57 - 1.00 mg/dL    GFR est non-AA 37 (L) >59 mL/min/1.73    GFR est AA 43 (L) >59 mL/min/1.73    BUN/Creatinine ratio 16 9 - 23    Sodium 140 134 - 144 mmol/L    Potassium 4.7 3.5 - 5.2 mmol/L    Chloride 105 96 - 106 mmol/L    CO2 22 20 - 29 mmol/L    Calcium 9.1 8.7 - 10.2 mg/dL   HEMOGLOBIN A1C WITH EAG   Result Value Ref Range    Hemoglobin A1c 7.7 (H) 4.8 - 5.6 %    Estimated average glucose 174 mg/dL   PROTHROMBIN TIME + INR   Result Value Ref Range    INR 2.8 (H) 0.8 - 1.2    Prothrombin time 27.6 (H) 9.1 - 12.0 sec     Lab Results   Component Value Date/Time    Cholesterol, total 107 11/12/2018 10:28 AM    HDL Cholesterol 27 (L) 11/12/2018 10:28 AM    LDL, calculated 54 11/12/2018 10:28 AM    VLDL, calculated 26 11/12/2018 10:28 AM    Triglyceride 129 11/12/2018 10:28 AM       ASSESSMENT and PLAN    ICD-10-CM ICD-9-CM    1.  Uncontrolled type 2 diabetes mellitus with stage 4 chronic kidney disease, with long-term current use of insulin ([facility]) E11.22 250.52     E11.65 585.4     N18.4 V58.67     Z79.4     2. 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 Diabetic polyneuropathy associated with type 2 diabetes mellitus ([facility]) E11.42 250.60      357.2    4. PAF (paroxysmal atrial fibrillation) ([facility]) I48.0 427.31    5. Sick sinus syndrome ([facility]) I49.5 427.81    6. Essential hypertension I10 401.9    7. Mixed hyperlipidemia E78.2 272.2    8. Long term (current) use of anticoagulants Z79.01 V58.61    9. Morbid obesity with BMI of 40.0-44.9, adult ([facility]) E66.01 278.01     Z68.41 V85.41    10. Anemia in stage 4 chronic kidney disease ([facility]) N18.4 285.21     D63.1 585.4    11. Venous stasis ulcer of right lower leg with edema of right lower leg ([facility]) I83.019 454.8     I83.891 454.0     L97.919      R60.9     12. Screening for depression Z13.31 V79.0 BEHAV ASSMT W/SCORE & DOCD/STAND INSTRUMENT     Diagnoses and all orders for this visit:    1. Uncontrolled type 2 diabetes mellitus with stage 4 chronic kidney disease, with long-term current use of insulin ([facility])  Diabetes Mellitus: borderline controlled, but improved. Issues reviewed with her: low cholesterol diet, weight control and daily exercise discussed and glycohemoglobin and other lab monitoring discussed. Is not taking statin. Intolerant  Is not taking ACE/ARB. due to CKD    2. 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, associated with type 2 diabetes mellitus ([facility]  Followed by eye doctor. Stable. 3. Diabetic polyneuropathy associated with type 2 diabetes mellitus ([facility])  No change. 4. PAF (paroxysmal atrial fibrillation) ([facility])  Stable     5. Sick sinus syndrome ([facility])  No change    6.  Essential hypertension  Blood pressure is high today; she has not taken medication yet. 7. Mixed hyperlipidemia  Hyperlipidemia is controlled    8. Long term (current) use of anticoagulants    9. Morbid obesity with BMI of 40.0-44.9, adult ([address]  Discussed using smaller plate for portion control, keeping a food diary for awareness of food consumed, checking weight often, and increasing physical activity. Will re-evaluate next visit. 10. Anemia in stage 4 chronic kidney disease (HCC)  Stable    11. Venous stasis ulcer of right lower leg with edema of right lower leg (HCC)  Follows at wound clinic; slowly healing. 12. Screening for depression-negative  -     BEHAV ASSMT W/SCORE & DOCD/STAND INSTRUMENT      Follow-up Disposition:  Return in about 4 months (around 7/11/2019) for DM, HTN, chol, A fib POC A1c .  lab results and schedule of future lab studies reviewed with patient  reviewed diet, exercise and weight control  I have discussed the diagnosis, evaluation and treatment options and the intended plan with the patient. Patient understands and is in agreement. The patient has received an after-visit summary and questions were answered concerning future plans. I have discussed side effects and warnings of any new medications with the patient as well.

## 2019-03-11 NOTE — PATIENT INSTRUCTIONS
Do Protime/INR on Mar 21  Office visit in 4 months with hemoglobin A1c a  that visit. Learning About Meal Planning for Diabetes  Why plan your meals? Meal planning can be a key part of managing diabetes. Planning meals and snacks with the right balance of carbohydrate, protein, and fat can help you keep your blood sugar at the target level you set with your doctor. You don't have to eat special foods. You can eat what your family eats, including sweets once in a while. But you do have to pay attention to how often you eat and how much you eat of certain foods. You may want to work with a dietitian or a certified diabetes educator. He or she can give you tips and meal ideas and can answer your questions about meal planning. This health professional can also help you reach a healthy weight if that is one of your goals. What plan is right for you? Your dietitian or diabetes educator may suggest that you start with the plate format or carbohydrate counting. The plate format  The plate format is a simple way to help you manage how you eat. You plan meals by learning how much space each food should take on a plate. Using the plate format helps you spread carbohydrate throughout the day. It can make it easier to keep your blood sugar level within your target range. It also helps you see if you're eating healthy portion sizes. To use the plate format, you put non-starchy vegetables on half your plate. Add meat or meat substitutes on one-quarter of the plate. Put a grain or starchy vegetable (such as brown rice or a potato) on the final quarter of the plate. You can add a small piece of fruit and some low-fat or fat-free milk or yogurt, depending on your carbohydrate goal for each meal.  Here are some tips for using the plate format:  · Make sure that you are not using an oversized plate. A 9-inch plate is best. Many restaurants use larger plates. · Get used to using the plate format at home.  Then you can use it when you eat out. · Write down your questions about using the plate format. Talk to your doctor, a dietitian, or a diabetes educator about your concerns. Carbohydrate counting  With carbohydrate counting, you plan meals based on the amount of carbohydrate in each food. Carbohydrate raises blood sugar higher and more quickly than any other nutrient. It is found in desserts, breads and cereals, and fruit. It's also found in starchy vegetables such as potatoes and corn, grains such as rice and pasta, and milk and yogurt. Spreading carbohydrate throughout the day helps keep your blood sugar levels within your target range. Your daily amount depends on several things, including your weight, how active you are, which diabetes medicines you take, and what your goals are for your blood sugar levels. A registered dietitian or diabetes educator can help you plan how much carbohydrate to include in each meal and snack. A guideline for your daily amount of carbohydrate is:  · 45 to 60 grams at each meal. That's about the same as 3 to 4 carbohydrate servings. · 15 to 20 grams at each snack. That's about the same as 1 carbohydrate serving. The Nutrition Facts label on packaged foods tells you how much carbohydrate is in a serving of the food. First, look at the serving size on the food label. Is that the amount you eat in a serving? All of the nutrition information on a food label is based on that serving size. So if you eat more or less than that, you'll need to adjust the other numbers. Total carbohydrate is the next thing you need to look for on the label. If you count carbohydrate servings, one serving of carbohydrate is 15 grams. For foods that don't come with labels, such as fresh fruits and vegetables, you'll need a guide that lists carbohydrate in these foods. Ask your doctor, dietitian, or diabetes educator about books or other nutrition guides you can use.   If you take insulin, you need to know how many grams of carbohydrate are in a meal. This lets you know how much rapid-acting insulin to take before you eat. If you use an insulin pump, you get a constant rate of insulin during the day. So the pump must be programmed at meals to give you extra insulin to cover the rise in blood sugar after meals. When you know how much carbohydrate you will eat, you can take the right amount of insulin. Or, if you always use the same amount of insulin, you need to make sure that you eat the same amount of carbohydrate at meals. If you need more help to understand carbohydrate counting and food labels, ask your doctor, dietitian, or diabetes educator. How do you get started with meal planning? Here are some tips to get started:  · Plan your meals a week at a time. Don't forget to include snacks too. · Use cookbooks or online recipes to plan several main meals. Plan some quick meals for busy nights. You also can double some recipes that freeze well. Then you can save half for other busy nights when you don't have time to cook. · Make sure you have the ingredients you need for your recipes. If you're running low on basic items, put these items on your shopping list too. · List foods that you use to make breakfasts, lunches, and snacks. List plenty of fruits and vegetables. · Post this list on the refrigerator. Add to it as you think of more things you need. · Take the list to the store to do your weekly shopping. Follow-up care is a key part of your treatment and safety. Be sure to make and go to all appointments, and call your doctor if you are having problems. It's also a good idea to know your test results and keep a list of the medicines you take. Where can you learn more? Go to http://dona-windy.info/. [employer] in the search box to learn more about \"Learning About Meal Planning for Diabetes. \"  Current as of: July 25, 2018  Content Version: 11.9  © 6196-6037 [employer], [facility].  Care instructions adapted under license by [employer] (which disclaims liability or warranty for this information). If you have questions about a medical condition or this instruction, always ask your healthcare professional. [address] any warranty or liability for your use of this information.

## 2019-03-11 NOTE — PROGRESS NOTES
Destiny Conn  Identified pt with two pt identifiers(name and ). Chief Complaint   Patient presents with    Diabetes    Hypertension    Cholesterol Problem       Reviewed record In preparation for visit and have obtained necessary documentation. Has info on advanced directive but has not filled them out. 1. Have you been to the ER, urgent care clinic or hospitalized since your last visit? No     2. Have you seen or consulted any other health care providers outside of the [address] [place] [address] since your last visit? Include any pap smears or colon screening. Wound center     Vitals reviewed with provider.     Health Maintenance reviewed:     Health Maintenance Due   Topic    Shingrix Vaccine Age 50> (1 of 2)    PAP AKA CERVICAL CYTOLOGY     EYE EXAM RETINAL OR DILATED           Wt Readings from Last 3 Encounters:   19 269 lb 8 oz (122.2 kg)   18 279 lb (126.6 kg)   18 272 lb (123.4 kg)        Temp Readings from Last 3 Encounters:   19 97.4 °F (36.3 °C) (Oral)   19 97.9 °F (36.6 °C)   19 97 °F (36.1 °C)        BP Readings from Last 3 Encounters:   19 (!) 186/105   19 149/77   19 158/83        Pulse Readings from Last 3 Encounters:   19 76   19 90   19 61        Vitals:    19 0849   BP: (!) 186/105   Pulse: 76   Resp: 14   Temp: 97.4 °F (36.3 °C)   TempSrc: Oral   SpO2: 97%   Weight: 269 lb 8 oz (122.2 kg)   Height: 5' 9\" (1.753 m)   PainSc:   0 - No pain          Learning Assessment:   :       Learning Assessment 3/25/2014   PRIMARY LEARNER Patient   HIGHEST LEVEL OF EDUCATION - PRIMARY LEARNER  2 YEARS OF COLLEGE   BARRIERS PRIMARY LEARNER NONE   CO-LEARNER CAREGIVER No   PRIMARY LANGUAGE ENGLISH    NEED No   LEARNER PREFERENCE PRIMARY DEMONSTRATION   LEARNING SPECIAL TOPICS Does does a pacemaker any noise   ANSWERED BY patient   RELATIONSHIP SELF        Depression Screening:   :       3 most recent PHQ Screens 3/11/2019   Little interest or pleasure in doing things Not at all   Feeling down, depressed, irritable, or hopeless Not at all   Total Score PHQ 2 0        Fall Risk Assessment:   :     No flowsheet data found. Abuse Screening:   :     No flowsheet data found.      ADL Screening:   :       ADL Assessment 11/17/2014   Feeding yourself No Help Needed   Getting from bed to chair No Help Needed   Getting dressed No Help Needed   Bathing or showering No Help Needed   Walk across the room (includes cane/walker) No Help Needed   Using the telphone No Help Needed   Taking your medications No Help Needed   Preparing meals No Help Needed   Managing money (expenses/bills) No Help Needed   Moderately strenuous housework (laundry) No Help Needed   Shopping for personal items (toiletries/medicines) No Help Needed   Shopping for groceries No Help Needed   Driving No Help Needed   Climbing a flight of stairs No Help Needed   Getting to places beyond walking distances No Help Needed

## 2019-03-15 ENCOUNTER — HOSPITAL ENCOUNTER (OUTPATIENT)
Dept: WOUND CARE | Age: 59
Discharge: HOME | End: 2019-03-15
Payer: COMMERCIAL

## 2019-03-15 VITALS
DIASTOLIC BLOOD PRESSURE: 78 MMHG | TEMPERATURE: 97.5 F | RESPIRATION RATE: 16 BRPM | HEART RATE: 84 BPM | SYSTOLIC BLOOD PRESSURE: 155 MMHG

## 2019-03-15 PROCEDURE — 97597 DBRDMT OPN WND 1ST 20 CM/<: CPT

## 2019-03-15 PROCEDURE — 74011000250 HC RX REV CODE- 250: Performed by: OTOLARYNGOLOGY

## 2019-03-15 RX ORDER — LIDOCAINE 40 MG/G
CREAM TOPICAL AS NEEDED
Status: DISCONTINUED | OUTPATIENT
Start: 2019-03-15 | End: 2019-03-18 | Stop reason: HOSPADM

## 2019-03-15 RX ADMIN — LIDOCAINE: 40 CREAM TOPICAL at 08:53

## 2019-03-15 NOTE — PROGRESS NOTES
[facility]  WOUND CARE PROGRESS NOTE    Name:  Allyssa Priest  MR#:  [id number]  :  1960  ACCOUNT #:  [de-identified]  DATE OF SERVICE:  03/15/2019    SUBJECTIVE:  The patient returns for treatment of a wound of the right lateral leg, which was originally due to trauma without a laceration S81.811D, with a venous hypertension ulcer I87.311, which originally went down to subcutaneous tissue L97.912 in a type 2 diabetic E11.622. The patient had a very good week. She had no problems, nor changes in her medications, allergies or review of systems. Last week, the wound was hypergranular. We cauterized it with silver nitrate after using a 5 mm ring curette for debridement. She has had no changes in her medications, allergies or review of systems since last seen. OBJECTIVE:  Her temperature today is 97.5, pulse 84, respirations 16, blood pressure 155/78. The wound today measures 4 x 2.3 x 0.1 cm, which is an improvement of 0.2 cm in both length and width. In addition, the center of the wound is starting to contract in nicely. The wound is covered with slough. It is recommended the wound be debrided. I explained the risks and benefits. The patient understood and wished to proceed. Therefore, topical Xylocaine was applied for 10 minutes. Using a 5 mm ring curette, the entire surface of the wound was debrided down to healthy bleeding granulation tissue, which is not as hypergranular as previously. Hemostasis was achieved with pressure until dry. The wound was scrubbed with Hibiclens followed by normal saline. ASSESSMENT/PLAN:  We are going to continue Acticoat Flex 7. I am not cauterizing it today. We are going to continue the [employer] The patient will return in 7 days for a nursing visit. All questions were answered. Her condition on discharge is stable.         Mandy Lee MD      [id number]  D:  03/15/2019 9:14  T:  03/15/2019 10:27  JOB #:  [id number]

## 2019-03-15 NOTE — WOUND CARE
03/15/19 0927   Wound Leg Lower Right;Lateral   Date First Assessed/Time First Assessed: 11/14/18 0830   POA: Yes  Wound Type: Pressure injury  Location: Leg Lower  Orientation: Right;Lateral   Dressing Type Applied Absorptive; Unna boot  (Acticoat Flex7,exudry)       Patient was discharged with Self to work and was ambulatory.  In stable condition with c/o pain:_0_/10_

## 2019-03-15 NOTE — WOUND CARE
03/15/19 0850   Wound Leg Lower Right;Lateral   Date First Assessed/Time First Assessed: 11/14/18 0830   POA: Yes  Wound Type: Pressure injury  Location: Leg Lower  Orientation: Right;Lateral   Dressing Status  Removed   Dressing Type  Unna boot  (Acticoat flex7,exudry)   Wound Length (cm) 4 cm   Wound Width (cm) 2.3 cm   Wound Depth (cm) 0.1   Wound Surface area (cm^2) 9.2 cm^2   Change in Wound Size % 84.03   Condition of Base Granulation;Pink;Slough   Condition of Edges Open   Drainage Amount  Moderate   Drainage Color Serosanguinous   Wound Odor None   Periwound Skin Condition Intact   Cleansing and Cleansing Agents  Soap and water;Normal saline     Visit Vitals  /78   Pulse 84   Temp 97.5 °F (36.4 °C)   Resp 16        RLE Peripheral Vascular   Capillary Refill: Less than/equal to 3 seconds (03/15/19 0849)  Color: Appropriate for race (03/15/19 0849)  Temperature: Warm (03/15/19 0849)  Sensation: Present (03/15/19 0849)  Pedal Pulse: Palpable (03/15/19 0849)  Circumference of Calf (cm): 41 cm (03/15/19 0849)  Location of Measurement (Calf): Mid  (03/15/19 0849)  Circumference of Ankle (cm): 22 cm (03/15/19 0849)  Location of Measurement (Ankle): Upper  (03/15/19 0849)

## 2019-03-20 DIAGNOSIS — I48.0 PAF (PAROXYSMAL ATRIAL FIBRILLATION) (HCC): ICD-10-CM

## 2019-03-22 ENCOUNTER — HOSPITAL ENCOUNTER (OUTPATIENT)
Dept: WOUND CARE | Age: 59
Discharge: HOME | End: 2019-03-22
Payer: COMMERCIAL

## 2019-03-22 VITALS
RESPIRATION RATE: 16 BRPM | DIASTOLIC BLOOD PRESSURE: 76 MMHG | TEMPERATURE: 97.8 F | HEART RATE: 76 BPM | SYSTOLIC BLOOD PRESSURE: 135 MMHG

## 2019-03-22 PROCEDURE — 29581 APPL MULTLAYER CMPRN SYS LEG: CPT

## 2019-03-22 PROCEDURE — 29580 STRAPPING UNNA BOOT: CPT

## 2019-03-22 NOTE — WOUND CARE
03/22/19 2473 Wound Leg Right; Lower Date First Assessed/Time First Assessed: 11/06/13 2100   Wound Type: (c) Other (comment)  Location: Leg  Wound Description (Optional): Cellulitus. Beefy red w/ruptured blisters. Orientation: Right; Lower Dressing Status  Removed Dressing Type (Acticoat, exudry, unna boot) Condition of Base Pink;Slough Condition of Edges Open Drainage Amount  Moderate Drainage Color Serosanguinous Wound Odor None Periwound Skin Condition Intact Cleansing and Cleansing Agents  Normal saline; Soap and water Dressing Type Applied (Acticoat, exudry, unna boot.) Procedure Tolerated Well Visit Vitals /76 (BP 1 Location: Left arm, BP Patient Position: At rest;Sitting) Pulse 76 Temp 97.8 °F (36.6 °C) Resp 16 Patient discharged to home ambulatory, denied pain, she is scheduled to return to clinic in one week for MD follow visit.

## 2019-03-26 ENCOUNTER — TELEPHONE (OUTPATIENT)
Dept: INTERNAL MEDICINE CLINIC | Facility: CLINIC | Age: 59
End: 2019-03-26

## 2019-03-26 DIAGNOSIS — I48.0 PAF (PAROXYSMAL ATRIAL FIBRILLATION) (HCC): Primary | ICD-10-CM

## 2019-03-26 LAB
INR PPP: 2.1 (ref 0.8–1.2)
PROTHROMBIN TIME: 20.9 SEC (ref 9.1–12)

## 2019-03-26 NOTE — PROGRESS NOTES
Inform patient INR is therapeutic. Confirm current dose of warfarin 4 mg tablets 1 tablet on Mondays and a half tablet the other 6 days. . No change in warfarin dose. Repeat in one month. Results sent in My Chart.

## 2019-03-29 ENCOUNTER — HOSPITAL ENCOUNTER (OUTPATIENT)
Dept: WOUND CARE | Age: 59
Discharge: HOME | End: 2019-03-29
Payer: COMMERCIAL

## 2019-03-29 VITALS
RESPIRATION RATE: 16 BRPM | DIASTOLIC BLOOD PRESSURE: 80 MMHG | TEMPERATURE: 98 F | SYSTOLIC BLOOD PRESSURE: 160 MMHG | HEART RATE: 62 BPM

## 2019-03-29 PROCEDURE — 74011000250 HC RX REV CODE- 250: Performed by: OTOLARYNGOLOGY

## 2019-03-29 PROCEDURE — 11042 DBRDMT SUBQ TIS 1ST 20SQCM/<: CPT

## 2019-03-29 RX ORDER — LIDOCAINE 40 MG/G
CREAM TOPICAL AS NEEDED
Status: DISCONTINUED | OUTPATIENT
Start: 2019-03-29 | End: 2019-04-01 | Stop reason: HOSPADM

## 2019-03-29 RX ADMIN — LIDOCAINE: 40 CREAM TOPICAL at 08:37

## 2019-03-29 NOTE — WOUND CARE
03/29/19 0656 Wound Leg Lower Right;Lateral  
Date First Assessed/Time First Assessed: 11/14/18 0830   POA: Yes  Wound Type: Pressure injury  Location: Leg Lower  Orientation: Right;Lateral  
Dressing Type Applied Gauze; Non-adherent;Silver products; Unna boot Patient was discharged with Self to home and was ambulatory. In stable condition with c/o pain:_0_/10_

## 2019-03-29 NOTE — WOUND CARE
03/29/19 9930 Wound Leg Lower Right;Lateral  
Date First Assessed/Time First Assessed: 11/14/18 0830   POA: Yes  Wound Type: Pressure injury  Location: Leg Lower  Orientation: Right;Lateral  
Dressing Status  Removed Dressing Type  Silver products; Unna boot; Absorptive Wound Length (cm) 3.5 cm Wound Width (cm) 1.6 cm Wound Depth (cm) 0.1 Wound Surface area (cm^2) 5.6 cm^2 Change in Wound Size % 90.28 Condition of Base Granulation Condition of Edges Open Drainage Amount  Moderate Drainage Color Serosanguinous Wound Odor None Periwound Skin Condition Intact Cleansing and Cleansing Agents  Soap and water;Normal saline Visit Vitals /80 Pulse 62 Temp 98 °F (36.7 °C) Resp 16 LLE Peripheral Vascular Capillary Refill: Less than/equal to 3 seconds (03/29/19 1022) Color: Appropriate for race (03/29/19 7681) Temperature: Warm (03/29/19 9883) Sensation: Present (03/29/19 0256) Pedal Pulse: Palpable (03/29/19 0730) Circumference of Calf (cm): 41 cm (03/29/19 1938) Location of Measurement (Calf): Mid  (03/29/19 7130) Circumference of Ankle (cm): 22 cm (03/29/19 0496) Location of Measurement (Ankle): Upper  (03/29/19 2250)

## 2019-03-29 NOTE — PROGRESS NOTES
[address] 
WOUND CARE PROGRESS NOTE Name:  Tom Ruelas 
MR#:  [id number] :  1960 ACCOUNT #:  [de-identified] DATE OF SERVICE:  2019 The patient returns for treatment of a wound to the right lateral leg which was originally due to trauma S81.811D, with a venous hypertension ulcer, I87.311, which goes down to subcutaneous tissue, L97.912 in a type 2 diabetic E11.622. The patient is doing quite well on current therapy. She has had no problems since last seen and has had no changes in her medications, allergies or review of systems. Her temperature is 98, pulse 62, respirations 16, blood pressure 160/80. The wound today is smaller at 3.5 x 1.6 x 0.1 cm. Small satellite wound medially and laterally are healed. The wound itself is pinching off well. There is slough in the wound down to subcutaneous tissue and it is recommended the wound be debrided and cauterized. I explained the risks and benefits. The patient understood and wished to proceed. Therefore, topical Xylocaine 4% gel was applied for 10 minutes. With the use of a 5 mm ring curette, the wound was debrided down to nice healthy bleeding subcutaneous tissue. Hemostasis was achieved with gentle pressure until dry. The wound was then scrubbed with Hibiclens followed by normal saline. The post-debridement dimensions increased in length, width, and depth by .1 cm. ASSESSMENT AND PLAN:  The patient is doing well on current therapy, therefore we are going to continue Acticoat Flex 7. Since the wound is not draining as much, we are going to hold the Exu-Dry and use dry 4x4s, followed by the [facility]. All questions were answered. Her condition on discharge is stable. She will return in 1 week. MD SHEEBA [id number] D:  2019 9:14 
T:  2019 9:17 JOB #:  [id number]

## 2019-04-01 ENCOUNTER — TELEPHONE (OUTPATIENT)
Dept: CARDIOLOGY CLINIC | Age: 59
End: 2019-04-01

## 2019-04-01 RX ORDER — BUMETANIDE 1 MG/1
TABLET ORAL
Qty: 14 TAB | Refills: 0 | Status: SHIPPED | OUTPATIENT
Start: 2019-04-01 | End: 2019-04-10 | Stop reason: SDUPTHER

## 2019-04-01 NOTE — TELEPHONE ENCOUNTER
LMVM to call me. Was already informed 2-23-19, appt was needed prior to refill. Has been over a year since pt was seen. One week's Bumex supply given, needs to be seen prior to further refills.

## 2019-04-02 ENCOUNTER — TELEPHONE (OUTPATIENT)
Dept: CARDIOLOGY CLINIC | Age: 59
End: 2019-04-02

## 2019-04-02 NOTE — TELEPHONE ENCOUNTER
Spoke with patient. Verified patient with two patient identifiers. Advised needs appt prior to further refills, overdue for yearly appt. Will ask PSR to call pt to schedule appt. Patient verbalized understanding.

## 2019-04-05 ENCOUNTER — HOSPITAL ENCOUNTER (OUTPATIENT)
Dept: WOUND CARE | Age: 59
Discharge: HOME | End: 2019-04-05
Payer: COMMERCIAL

## 2019-04-05 VITALS
SYSTOLIC BLOOD PRESSURE: 177 MMHG | DIASTOLIC BLOOD PRESSURE: 99 MMHG | HEART RATE: 68 BPM | RESPIRATION RATE: 16 BRPM | TEMPERATURE: 98.1 F

## 2019-04-05 PROCEDURE — 97597 DBRDMT OPN WND 1ST 20 CM/<: CPT

## 2019-04-05 PROCEDURE — 11043 DBRDMT MUSC&/FSCA 1ST 20/<: CPT

## 2019-04-05 NOTE — WOUND CARE
04/05/19 1087 Wound Leg Lower Right;Lateral  
Date First Assessed/Time First Assessed: 11/14/18 0830   POA: Yes  Wound Type: Pressure injury  Location: Leg Lower  Orientation: Right;Lateral  
Dressing Status  Removed Dressing Type  Silver products; Compression Wrap/Venous Stasis Non-Pressure Injury Full thickness (subcut/muscle) Wound Length (cm) 3.3 cm Wound Width (cm) 1.5 cm Wound Depth (cm) 0.1 Wound Surface area (cm^2) 4.95 cm^2 Change in Wound Size % 91.41 Condition of Base Granulation Tissue Type Pink Drainage Amount  Moderate Drainage Color Serosanguinous Wound Odor None Periwound Skin Condition Intact Cleansing and Cleansing Agents  Normal saline Visit Vitals BP (!) 177/99 (BP 1 Location: Left arm) Pulse 68 Temp 98.1 °F (36.7 °C) Resp 16

## 2019-04-05 NOTE — PROGRESS NOTES
[address] 
WOUND CARE PROGRESS NOTE Name:  Dominique Simmons 
MR#:  [id number] :  1960 ACCOUNT #:  [de-identified] DATE OF SERVICE:  2019 HISTORY:  The patient returns for treatment of a wound of the right lateral leg which was originally due to trauma in 10/2018, S81.811D, which includes a venous leg ulcer I87.311, which originally went down to subcutaneous tissue, L97.912, in a type 2 diabetic E11.622. The patient had a good week. She had no problems and no changes in her medications, allergies or review of systems. Her temperature today is 98.1, pulse 68, respirations 16, blood pressure 177/99. When she first presented in November, the wound was 9 x 6.4 and today it measures 3.3 x 1.5 x 0.1 cm which is approximately a 91 or 92% reduction in the size of the wound. The wound is pinching off distally. There is nice  healthy granulation tissue with areas of slough and it is recommended that the wound be debrided. I explained the risks and benefits. The patient understood and wished to proceed. Therefore, topical Xylocaine was applied for 10 minutes. With the use of a 5-mm ring curette, the wound was completely debrided down to nice healthy bleeding granulation tissue. Post debridement dimensions are 3.4 x 1.6 x 0.2 cm. The wound was then scrubbed with Hibiclens followed by normal saline. ASSESSMENT AND PLAN:  Since the patient is doing well on current therapy, we are going to continue Acticoat Flex 7, 4x4s and an Unna boot. She will keep her leg elevated as much as possible. She will do gastrocnemius muscle exercise hourly while awake. She will continue to wear a compression stocking, which is a sport style, on the left leg, and she will have that available when we are ready to convert her over to that modality, once she has healed. All questions were answered. Her condition on discharge is stable.  
 
 
 
Radames Dowell MD 
 
 
 [id number] 
D:  04/05/2019 8:52 T:  04/05/2019 8:54 JOB #:  [id number]

## 2019-04-05 NOTE — WOUND CARE
04/05/19 3786 Wound Leg Lower Right;Lateral  
Date First Assessed/Time First Assessed: 11/14/18 0830   POA: Yes  Wound Type: Pressure injury  Location: Leg Lower  Orientation: Right;Lateral  
Dressing Type Applied Unna boot 
(acticoat 7) Patient was discharged with Self to home and was ambulatory. In stable condition with c/o pain:__0/10_

## 2019-04-08 ENCOUNTER — OFFICE VISIT (OUTPATIENT)
Dept: CARDIOLOGY CLINIC | Age: 59
End: 2019-04-08

## 2019-04-08 VITALS
HEART RATE: 68 BPM | SYSTOLIC BLOOD PRESSURE: 126 MMHG | BODY MASS INDEX: 38.78 KG/M2 | WEIGHT: 261.8 LBS | OXYGEN SATURATION: 97 % | RESPIRATION RATE: 18 BRPM | DIASTOLIC BLOOD PRESSURE: 60 MMHG | HEIGHT: 69 IN

## 2019-04-08 DIAGNOSIS — N18.4 ANEMIA IN STAGE 4 CHRONIC KIDNEY DISEASE (HCC): ICD-10-CM

## 2019-04-08 DIAGNOSIS — D63.1 ANEMIA IN STAGE 4 CHRONIC KIDNEY DISEASE (HCC): ICD-10-CM

## 2019-04-08 DIAGNOSIS — E78.2 MIXED HYPERLIPIDEMIA: ICD-10-CM

## 2019-04-08 DIAGNOSIS — E11.42 DIABETIC POLYNEUROPATHY ASSOCIATED WITH TYPE 2 DIABETES MELLITUS (HCC): ICD-10-CM

## 2019-04-08 DIAGNOSIS — I10 ESSENTIAL HYPERTENSION: ICD-10-CM

## 2019-04-08 DIAGNOSIS — I48.0 PAF (PAROXYSMAL ATRIAL FIBRILLATION) (HCC): Primary | ICD-10-CM

## 2019-04-08 DIAGNOSIS — E66.01 MORBID OBESITY WITH BMI OF 40.0-44.9, ADULT (HCC): ICD-10-CM

## 2019-04-08 DIAGNOSIS — N18.4 CKD (CHRONIC KIDNEY DISEASE), STAGE IV (HCC): ICD-10-CM

## 2019-04-08 DIAGNOSIS — I49.5 SICK SINUS SYNDROME (HCC): ICD-10-CM

## 2019-04-08 NOTE — PROGRESS NOTES
[address] [place] [address]        [phone]                             NEW PATIENT HPI/FOLLOW-UP    NAME:  Bridgette Lu   :   1960   MRN:   [id number]   PCP:  Carmela Ashton MD           Subjective: The patient is a 60y.o. year old female  who returns for a routine follow-up. Since the last visit, patient reports no new symptoms. Recently retired from [email or web address] after > 30 yrs of service. BP now normal. Denies change in exercise tolerance, chest pain, edema, medication intolerance, palpitations, shortness of breath, PND/orthopnea wheezing, sputum, syncope, dizziness or light headedness. Doing satisfactorily. Review of Systems  General: Pt denies excessive weight gain or loss. Pt is able to conduct ADL's. Respiratory: Denies shortness of breath, CHOW, wheezing or stridor.   Cardiovascular: Denies precordial pain, palpitations, edema or PND  Gastrointestinal: Denies poor appetite, indigestion, abdominal pain or blood in stool  Peripheral vascular: Denies claudication, leg cramps  Neurological: Denies paresthesias, tingling.numbness  Psychiatric: Denies anxiety,depression,fatigue  Musculoskeletal: Denies pain,tenderness, soreness,swelling      Past Medical History:   Diagnosis Date    Acquired lymphedema     Right arm    Arrhythmia     Asthma     Atrial fibrillation (HCC)     Dr. Samson Seymour and Dr. Ronni Hernandez Atrial flutter ([facility]     Cancer [facility])     bilateral breast    Chronic kidney disease     Diabetes mellitus type II     Dizziness     Essential hypertension     Hyperlipidemia     Hypertension     Long term (current) use of anticoagulants     Morbid obesity ([facility] 3/14/2012    Osteoarthritis     Pacemaker     Sick sinus syndrome 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, associated with type 2 diabetes mellitus ([facility] 04/25/2018    Morbid obesity with BMI of 40.0-44.9, adult ([facility] 05/01/2017    Uncontrolled type 2 diabetes mellitus with stage 4 chronic kidney disease, with long-term current use of insulin ([facility] 01/16/2017    PAF (paroxysmal atrial fibrillation) ([facility] 11/28/2016    Anemia in stage 4 chronic kidney disease ([facility] 11/28/2016    Essential hypertension 87/87/1970    Systolic murmur 29/57/6340    CKD (chronic kidney disease), stage IV ([facility] 06/09/2012    Mixed hyperlipidemia 05/22/2012    Long term (current) use of anticoagulants 04/11/2012    S/P cardiac pacemaker procedure 04/03/2012    Sick sinus syndrome ([facility] 04/02/2012    Status post ablation of atrial fibrillation 12/15/2011    Fe deficiency anemia 04/14/2010    History of breast cancer 04/13/2010    Asthma 04/13/2010      Past Surgical History:   Procedure Laterality Date    ABDOMEN SURGERY PROC UNLISTED      gastric bypass    GASTRIC BYPASS,OBESE<150CM SAW-EN-Y  7/08    [facility]    HX AFIB ABLATION      HX CARPAL TUNNEL RELEASE      HX CERVICAL FUSION  1994    HX DILATION AND CURETTAGE  1992    HX MASTECTOMY  1998    RIGHT MODIFIED RADICAL     HX MOHS PROCEDURES  1995    right    HX ORTHOPAEDIC      ight knee surgery    HX PACEMAKER      IR INSERT TUNL CVC W PORT OVER 5 YEARS      LAMINECTOMY,CERVICAL  1994    NEEDLE BIOPSY LIVER,W OTHR [address]  8.21.07    PA ANESTH,KNEE AREA SURGERY  1982 AND 1994    PA TRABECULOPLASTY BY LASER SURGERY      US GUIDE ASP OVARIAN CYST ABD APPROACH  8.21.07    RIGHT      Allergies   Allergen Reactions    Ace Inhibitors Cough    Amlodipine Swelling    Avapro [Irbesartan] Unable to Obtain    Bactrim [Sulfamethoxazole-Trimethoprim] Other (comments) Sore mouth    Captopril Cough    Carvedilol Diarrhea    Contrast Agent [Iodine] Itching    Fish Containing Products Hives    Morphine Nausea and Vomiting and Swelling    Penicillins Hives    Pravachol [Pravastatin] Nausea Only    Seafood [Shellfish Containing Products] Hives    Singulair [Montelukast] Other (comments)     palpitations      Family History   Problem Relation Age of Onset    Cancer Mother     Heart Disease Mother     Alcohol abuse Father     Diabetes Brother     Hypertension Brother       Social History     Socioeconomic History    Marital status:      Spouse name: Not on file    Number of children: Not on file    Years of education: Not on file    Highest education level: Not on file   Occupational History    Not on file   Social Needs    Financial resource strain: Not on file    Food insecurity:     Worry: Not on file     Inability: Not on file    Transportation needs:     Medical: Not on file     Non-medical: Not on file   Tobacco Use    Smoking status: Never Smoker    Smokeless tobacco: Never Used   Substance and Sexual Activity    Alcohol use: Yes     Comment: rare    Drug use: No    Sexual activity: Not on file   Lifestyle    Physical activity:     Days per week: Not on file     Minutes per session: Not on file    Stress: Not on file   Relationships    Social connections:     Talks on phone: Not on file     Gets together: Not on file     Attends Judaism service: Not on file     Active member of club or organization: Not on file     Attends meetings of clubs or organizations: Not on file     Relationship status: Not on file    Intimate partner violence:     Fear of current or ex partner: Not on file     Emotionally abused: Not on file     Physically abused: Not on file     Forced sexual activity: Not on file   Other Topics Concern    Not on file   Social History Narrative    Not on file      Current Outpatient Medications   Medication Sig    bumetanide (BUMEX) 1 mg tablet TAKE ONE TABLET BY MOUTH TWICE A DAY    cloNIDine HCl (CATAPRES) 0.3 mg tablet TAKE ONE TABLET BY MOUTH TWICE A DAY    TRUEPLUS INSULIN 0.5 mL 31 gauge x 5/16\" syrg USE ONE SYRINGE DAILY AS NEEDED.  hydrALAZINE (APRESOLINE) 100 mg tablet TAKE ONE TABLET BY MOUTH THREE TIMES A DAY    warfarin (COUMADIN) 4 mg tablet Take 1 tablet on Mondays and a half tablet the other 6 days.  sertraline (ZOLOFT) 50 mg tablet TAKE ONE AND ONE-HALF (1 & 1/2) TABLET BY MOUTH DAILY    insulin glargine (LANTUS U-100 INSULIN) 100 unit/mL injection 20 Units by SubCUTAneous route daily.  metoprolol succinate (TOPROL-XL) 100 mg tablet TAKE TWO TABLETS BY MOUTH DAILY    doxazosin (CARDURA) 4 mg tablet TAKE ONE TABLET BY MOUTH ONCE NIGHTLY    busPIRone (BUSPAR) 10 mg tablet Take one po twice daily    potassium chloride SR (KLOR-CON 10) 10 mEq tablet TAKE ONE TABLET BY MOUTH DAILY    metolazone (ZAROXOLYN) 5 mg tablet Take 1 Tab by mouth daily as needed (take if develop increased LE edema, weight gain > 5 pounds. ).  cholecalciferol, VITAMIN D3, (VITAMIN D3) 5,000 unit tab tablet Take 5,000 Units by mouth daily.  vitamin A 8,000 unit capsule Take 8,000 Units by mouth daily.  ACETAMINOPHEN/DIPHENHYDRAMINE (TYLENOL PM PO) Take 2 Tabs by mouth nightly as needed.  insulin lispro (HUMALOG) 100 unit/mL kwikpen 2 units with dinner for sugars over 200    cyanocobalamin (VITAMIN B-12) 1,000 mcg sublingual tablet Take 1,000 mcg by mouth daily.  calcium carbonate (TUMS) 200 mg calcium (500 mg) Chew Take 1 Tab by mouth three (3) times daily (after meals). No current facility-administered medications for this visit. I have reviewed the nurses notes, vitals, problem list, allergy list, medical history, family medical, social history and medications.         Objective:     Physical Exam:     Vitals:    04/08/19 1124   BP: 126/60   Pulse: 68   Resp: 18   SpO2: 97%   Weight: 261 lb 12.8 oz (118.8 kg) Height: 5' 9\" (1.753 m)    Body mass index is 38.66 kg/m². General: Well developed, obese in no acute distress. HEENT: No carotid bruits, no JVD, trach is midline. Heart:  Normal S1/S2 negative S3 or S4. Regular, no murmur, gallop or rub.   Respiratory: Clear bilaterally, no wheezing or rales  Abdomen:   Soft, non-tender, bowel sounds are active.   Extremities:  No edema, normal cap refill, no cyanosis. Neuro: A&Ox3, speech clear, gait stable. Skin: Skin color is normal. No rashes or lesions. No diaphoresis.   Vascular: 2+ pulses symmetric in all extremities        Data Review:       Cardiographics:    EKG: Probable sinus with usual P wave axis--barely discernible, IVCD, minor non-specific ST-T wave changes    Cardiology Labs:    Results for orders placed or performed during the hospital encounter of 04/09/14   EKG, 12 LEAD, INITIAL   Result Value Ref Range    Ventricular Rate 82 BPM    Atrial Rate 82 BPM    P-R Interval 168 ms    QRS Duration 120 ms    Q-T Interval 454 ms    QTC Calculation (Bezet) 530 ms    Calculated P Axis 58 degrees    Calculated R Axis -25 degrees    Calculated T Axis 94 degrees    Diagnosis       Normal sinus rhythm  Left ventricular hypertrophy with QRS widening  Cannot rule out Septal infarct , age undetermined  When compared with ECG of 07-NOV-2013 12:02,  Sinus rhythm has replaced Electronic atrial pacemaker  Minimal criteria for Septal infarct are now present  Confirmed by Maeve Faulkner MD, Aniyah Block ([id number]) on 4/9/2014 12:03:30 PM       Lab Results   Component Value Date/Time    Cholesterol, total 107 11/12/2018 10:28 AM    HDL Cholesterol 27 (L) 11/12/2018 10:28 AM    LDL, calculated 54 11/12/2018 10:28 AM    Triglyceride 129 11/12/2018 10:28 AM       Lab Results   Component Value Date/Time    Sodium 140 02/21/2019 03:31 PM    Potassium 4.7 02/21/2019 03:31 PM    Chloride 105 02/21/2019 03:31 PM    CO2 22 02/21/2019 03:31 PM    Anion gap 10 05/21/2016 04:38 PM    Glucose 132 (H) 02/21/2019 03:31 PM    BUN 25 (H) 02/21/2019 03:31 PM    Creatinine 1.52 (H) 02/21/2019 03:31 PM    BUN/Creatinine ratio 16 02/21/2019 03:31 PM    GFR est AA 43 (L) 02/21/2019 03:31 PM    GFR est non-AA 37 (L) 02/21/2019 03:31 PM    Calcium 9.1 02/21/2019 03:31 PM    Bilirubin, total 0.3 11/12/2018 10:28 AM    AST (SGOT) 9 11/12/2018 10:28 AM    Alk. phosphatase 112 11/12/2018 10:28 AM    Protein, total 7.0 11/12/2018 10:28 AM    Albumin 4.0 11/12/2018 10:28 AM    Globulin 4.1 (H) 05/21/2016 04:38 PM    A-G Ratio 1.3 11/12/2018 10:28 AM    ALT (SGPT) 8 11/12/2018 10:28 AM          Assessment:       ICD-10-CM ICD-9-CM    1. PAF (paroxysmal atrial fibrillation) (HCC) I48.0 427.31 AMB POC EKG ROUTINE W/ 12 LEADS, INTER & REP   2. Mixed hyperlipidemia E78.2 272.2    3. CKD (chronic kidney disease), stage IV (HCC) N18.4 585.4    4. Essential hypertension I10 401.9    5. Anemia in stage 4 chronic kidney disease (HCC) N18.4 285.21     D63.1 585.4    6. Sick sinus syndrome (HCC) I49.5 427.81    7. Diabetic polyneuropathy associated with type 2 diabetes mellitus (HCC) E11.42 250.60      357.2    8. Morbid obesity with BMI of 40.0-44.9, adult ([facility] E66.01 278.01     Z68.41 V85.41          Discussion: Patient presents at this time stable from a cardiac perspective. Remains on Coumadin for PAFib. EKG reveals possible atypical SR vs atrial rhythm--stable though. Pleased with present cardiac status. Plan: 1. Continue same meds. Lipid profile and labs followed by PCP-at goal. . 2. Encouraged to exercise to tolerance, lose weight and follow low fat, low cholesterol, low sodium predominantly Plant-based (consider Mediterranean) diet. Call with questions or concerns. Will follow up any test results by phone and/or f/u here in office if needed. [place] 3.Follow up: 1 year or sooner if needed. I have discussed the diagnosis with the patient and the intended plan as seen in the above orders.   The patient has received an after-visit summary and questions were answered concerning future plans. I have discussed any concerning medication side effects and warnings with the patient as well.     Christofer Cervantes MD  4/8/2019

## 2019-04-08 NOTE — PROGRESS NOTES
1. Have you been to the ER, urgent care clinic since your last visit? Hospitalized since your last visit? No    2. Have you seen or consulted any other health care providers outside of the [address] [place] [address] since your last visit? Include any pap smears or colon screening. No    Chief Complaint   Patient presents with    Irregular Heart Beat     Yearly appt. Denied cardiac symptoms.

## 2019-04-10 RX ORDER — BUMETANIDE 1 MG/1
TABLET ORAL
Qty: 14 TAB | Refills: 0 | Status: SHIPPED | OUTPATIENT
Start: 2019-04-10 | End: 2019-04-23 | Stop reason: SDUPTHER

## 2019-04-12 ENCOUNTER — HOSPITAL ENCOUNTER (OUTPATIENT)
Dept: WOUND CARE | Age: 59
Discharge: HOME | End: 2019-04-12
Payer: COMMERCIAL

## 2019-04-12 VITALS
HEART RATE: 84 BPM | TEMPERATURE: 97.9 F | DIASTOLIC BLOOD PRESSURE: 79 MMHG | RESPIRATION RATE: 18 BRPM | SYSTOLIC BLOOD PRESSURE: 171 MMHG

## 2019-04-12 PROCEDURE — 11042 DBRDMT SUBQ TIS 1ST 20SQCM/<: CPT

## 2019-04-12 PROCEDURE — 74011000250 HC RX REV CODE- 250: Performed by: OTOLARYNGOLOGY

## 2019-04-12 RX ORDER — LIDOCAINE 40 MG/G
CREAM TOPICAL AS NEEDED
Status: DISCONTINUED | OUTPATIENT
Start: 2019-04-12 | End: 2019-04-15 | Stop reason: HOSPADM

## 2019-04-12 RX ADMIN — LIDOCAINE: 40 CREAM TOPICAL at 08:42

## 2019-04-12 NOTE — WOUND CARE
04/12/19 5843 Wound Leg Lower Right;Lateral  
Date First Assessed/Time First Assessed: 11/14/18 0830   POA: Yes  Wound Type: Pressure injury  Location: Leg Lower  Orientation: Right;Lateral  
Cleansing and Cleansing Agents  Chlorhexidine gluconate 4%;Normal saline Wound Procedure Type Debridement- Surgical  
Procedure Time Out 3746 Consent Obtained  Yes Procedure Bleeding Minimal  
Procedure Hemostasis  Silver Nitrate Procedure Instrument  Curette Procedure Pain Scale Numeric 0/10 Post-Procedure Length (cm) 3 cm Post-Procedure Width (cm) 1.4 cm Post-Procedure Depth (cm) 0.2 cm Post-Procedure Volume (cm^3) 0.84 cm^3 Post-Procedure Surface Area (cm^2) 4.2 cm^2 Post Procedure Pain Scale Numeric 0/10 Procedure Tolerated Well

## 2019-04-12 NOTE — PROGRESS NOTES
[address] 
WOUND CARE PROGRESS NOTE Name:  Mae Cortez 
MR#:  [id number] :  1960 ACCOUNT #:  [de-identified] DATE OF SERVICE:  2019 SUBJECTIVE:  The patient returns for treatment of a wound to the right lower extremity originally due to trauma with a laceration S81.811D, with a venous hypertension ulcer I87.311, which goes down to subcutaneous tissue L97.912, in a type 2 diabetic 11.622. The patient had a good week and denies any changes in medications, allergies, or review of systems. OBJECTIVE: 
VITAL SIGNS:  Her temperature is 97.9, pulse 84, respirations 18, blood pressure 171/79. WOUND EXAMINATION:  Last week the wound dimensions were 3.3 x 1.5 x 0.1 cm. Today, they are improved at 3 x 1.4 x 0.1 cm. The distal end of the wound is pinching off, and there is a layer of slough covering the wound with periwound maceration and epibole. Therefore, it is recommended that the wound be debrided down to subcutaneous tissue and the tissue then cauterized to help stimulate new growth. I explained the risks and benefits. The patient understood and wished to proceed. Therefore, topical Xylocaine was applied for 10 minutes. Using a 5-mm ring curette, the surface of the entire wound was debrided of all devitalized tissue. The edges of the wound were debrided to get back the healthy actively growing skin cells. Hemostasis was achieved with pressure until dry. The surface of the wound was then cauterized with silver nitrate over its entirety. The post debridement dimensions were 3.1 x 1.5 x 0.2 cm, and the greatest depth was down to subcutaneous tissue. The wound was then scrubbed with Hibiclens followed by normal saline. ASSESSMENT/PLAN:  We are going to continue with Acticoat Flex 7. We are going to add zinc to the periwound. We are going to cover the Acticoat with 4x4s and reapply an Unna boot.   The patient will wear her sports-style stocking on the left leg. She will keep both legs elevated as much as possible. She will do gastrocnemius muscle exercise hourly throughout the day, and she will return in 1 week. All questions were answered. Her condition on discharge is stable. Merlin Shelter, MD [email or web address] D:  04/12/2019 9:05 
T:  04/12/2019 9:08 JOB #:  [id number]

## 2019-04-12 NOTE — WOUND CARE
04/12/19 0840 Wound Leg Lower Right;Lateral  
Date First Assessed/Time First Assessed: 11/14/18 0830   POA: Yes  Wound Type: Pressure injury  Location: Leg Lower  Orientation: Right;Lateral  
Dressing Status  Intact Dressing Type   
(ACTICOAT, EXUDRY) Non-Pressure Injury Full thickness (subcut/muscle) Wound Length (cm) 3 cm Wound Width (cm) 1.4 cm Wound Depth (cm) 0.1 Wound Surface area (cm^2) 4.2 cm^2 Change in Wound Size % 92.71 Condition of Base Granulation Condition of Edges Open Tissue Type Pink Drainage Amount  Small Drainage Color Serosanguinous Wound Odor None Periwound Skin Condition Intact Cleansing and Cleansing Agents  Chlorhexidine gluconate 4% Visit Vitals /79 (BP 1 Location: Left arm, BP Patient Position: Sitting) Pulse 84 Temp 97.9 °F (36.6 °C) Resp 18

## 2019-04-17 DIAGNOSIS — F43.0 ANXIETY AS ACUTE REACTION TO GROSS STRESS: ICD-10-CM

## 2019-04-17 DIAGNOSIS — F41.1 ANXIETY AS ACUTE REACTION TO GROSS STRESS: ICD-10-CM

## 2019-04-17 RX ORDER — SERTRALINE HYDROCHLORIDE 50 MG/1
TABLET, FILM COATED ORAL
Qty: 45 TAB | Refills: 3 | Status: SHIPPED | OUTPATIENT
Start: 2019-04-17 | End: 2019-08-22 | Stop reason: SDUPTHER

## 2019-04-19 ENCOUNTER — HOSPITAL ENCOUNTER (OUTPATIENT)
Dept: WOUND CARE | Age: 59
Discharge: HOME | End: 2019-04-19
Payer: COMMERCIAL

## 2019-04-19 VITALS
TEMPERATURE: 97.4 F | HEART RATE: 80 BPM | DIASTOLIC BLOOD PRESSURE: 72 MMHG | RESPIRATION RATE: 16 BRPM | SYSTOLIC BLOOD PRESSURE: 132 MMHG

## 2019-04-19 PROCEDURE — 11042 DBRDMT SUBQ TIS 1ST 20SQCM/<: CPT

## 2019-04-19 PROCEDURE — 74011000250 HC RX REV CODE- 250: Performed by: OTOLARYNGOLOGY

## 2019-04-19 RX ADMIN — Medication: at 08:21

## 2019-04-19 NOTE — WOUND CARE
04/19/19 9775 Wound Leg Lower Right;Lateral  
Date First Assessed/Time First Assessed: 11/14/18 0830   POA: Yes  Wound Type: Pressure injury  Location: Leg Lower  Orientation: Right;Lateral  
Dressing Status  Removed Dressing Type  Unna boot 
(Acticoat flex 7) Wound Length (cm) 2.4 cm Wound Width (cm) 1.1 cm Wound Depth (cm) 0.1 Wound Surface area (cm^2) 2.64 cm^2 Change in Wound Size % 95.42 Condition of Base Granulation;Pink;Slough Condition of Edges Open Drainage Amount  Small Drainage Color Serosanguinous Wound Odor None Periwound Skin Condition Intact Cleansing and Cleansing Agents  Soap and water;Normal saline Visit Vitals /72 Pulse 80 Temp 97.4 °F (36.3 °C) Resp 16 RLE Peripheral Vascular Capillary Refill: Less than/equal to 3 seconds (04/19/19 0813) Color: Appropriate for race (04/19/19 0813) Temperature: Warm (04/19/19 0813) Sensation: Present (04/19/19 0813) Pedal Pulse: Palpable (04/19/19 0813) Circumference of Calf (cm): 41 cm (04/19/19 0813) Location of Measurement (Calf): Mid  (04/19/19 0813) Circumference of Ankle (cm): 22.5 cm (04/19/19 0813) Location of Measurement (Ankle): Upper  (04/19/19 0813)

## 2019-04-19 NOTE — PROGRESS NOTES
[address] 
WOUND CARE PROGRESS NOTE Name:  Rosa Frost 
MR#:  [id number] :  1960 ACCOUNT #:  [de-identified] DATE OF SERVICE:  2019 The patient returns for treatment of a wound to the right lateral leg which was originally due to a laceration S81.811D, with a venous hypertension ulcer I87.311, which extends down to subcutaneous tissue L97.912 in a type 2 diabetic E11.622. The patient had a good week. She denies any problems or changes in her medications, allergies, or review of systems. OBJECTIVE:  Her temperature today is 97.4, pulse 80, respirations 16, blood pressure 132/72. WOUND EXAMINATION:  The wound is starting to pinch closed in a lateral-medial dimension. It is also shorter in dimensions this week. Last week the dimensions were 3 x 1.4 x 0.1 cm. The wound was debrided and cauterized, and today the dimensions are 2.4 x 1.1 x 0.1 cm. It is elected to simply debride the wound of all devitalized tissue and to strip out the edges of the wound to get back to healthy actively growing skin cells. I explained the risks and benefits of wound debridement. The patient understood and wished to proceed. Therefore, topical Xylocaine 4% gel was applied for 10 minutes. Using a 5-mm ring curette, the wound was debrided of all devitalized tissue. The edges of the wound were stripped out to get back to actively growing cells. Hemostasis was achieved with gentle pressure until dry. The wound was scrubbed with Hibiclens followed by normal saline. Silver nitrate was not used today. ASSESSMENT/PLAN:  We are going to continue Acticoat Flex 7, zinc to the periwound, 4x4s, and Unna boot. The patient will wear her support style compression stocking on the left leg. She will keep her legs elevated. She will do gastrocnemius muscle exercise hourly while awake and she will return to see us in 1 week.   If the wound does not respond as favorably as this week, we will cauterize it at next week's visit after wound debridement. All questions were answered. Her condition on discharge is stable. Mitul Recinos MD 
 
 
[email or web address] 
D:  04/19/2019 8:43 T:  04/19/2019 8:46 JOB #:  [id number]

## 2019-04-19 NOTE — WOUND CARE
04/19/19 3031 Wound Leg Lower Right;Lateral  
Date First Assessed/Time First Assessed: 11/14/18 0830   POA: Yes  Wound Type: Pressure injury  Location: Leg Lower  Orientation: Right;Lateral  
Dressing Type Applied Absorptive;Silver products; Unna boot;Zinc based paste Patient was discharged with Self to home and was ambulatory. In stable condition with c/o pain:_0_/10_

## 2019-04-22 DIAGNOSIS — I48.0 PAF (PAROXYSMAL ATRIAL FIBRILLATION) (HCC): ICD-10-CM

## 2019-04-22 RX ORDER — POTASSIUM CHLORIDE 750 MG/1
TABLET, FILM COATED, EXTENDED RELEASE ORAL
Qty: 90 TAB | Refills: 3 | Status: SHIPPED | OUTPATIENT
Start: 2019-04-22 | End: 2019-12-09

## 2019-04-23 DIAGNOSIS — I48.0 PAF (PAROXYSMAL ATRIAL FIBRILLATION) (HCC): ICD-10-CM

## 2019-04-23 LAB
INR PPP: 1.5 (ref 0.8–1.2)
PROTHROMBIN TIME: 15.3 SEC (ref 9.1–12)

## 2019-04-23 RX ORDER — WARFARIN 4 MG/1
TABLET ORAL
Qty: 45 TAB | Refills: 2
Start: 2019-04-23 | End: 2019-05-30

## 2019-04-25 ENCOUNTER — TELEPHONE (OUTPATIENT)
Dept: CARDIOLOGY CLINIC | Age: 59
End: 2019-04-25

## 2019-04-25 RX ORDER — BUMETANIDE 1 MG/1
TABLET ORAL
Qty: 180 TAB | Refills: 3 | Status: SHIPPED | OUTPATIENT
Start: 2019-04-25 | End: 2019-08-09 | Stop reason: DRUGHIGH

## 2019-04-25 NOTE — TELEPHONE ENCOUNTER
VERNI-- patient called for status of  Bumex 1mg , advised it was received by the pharmacy on 4- @ 12am.      Confirmed the pharmacy. Thanks.

## 2019-04-26 ENCOUNTER — HOSPITAL ENCOUNTER (OUTPATIENT)
Dept: WOUND CARE | Age: 59
Discharge: HOME | End: 2019-04-26
Payer: COMMERCIAL

## 2019-04-26 VITALS
DIASTOLIC BLOOD PRESSURE: 73 MMHG | RESPIRATION RATE: 16 BRPM | TEMPERATURE: 96.7 F | HEART RATE: 91 BPM | SYSTOLIC BLOOD PRESSURE: 133 MMHG

## 2019-04-26 PROCEDURE — 74011000250 HC RX REV CODE- 250: Performed by: OTOLARYNGOLOGY

## 2019-04-26 PROCEDURE — 97597 DBRDMT OPN WND 1ST 20 CM/<: CPT

## 2019-04-26 RX ADMIN — Medication: at 08:31

## 2019-04-26 NOTE — PROGRESS NOTES
[address] 
WOUND CARE PROGRESS NOTE Name:  Mouna Drew 
MR#:  [id number] :  1960 ACCOUNT #:  [de-identified] DATE OF SERVICE:  2019 SUBJECTIVE:  The patient returns for treatment of a wound to the right lateral leg, which was originally a laceration without foreign body, S81.811D, with a venous hypertension ulcer, I87.311, which extended down into subcutaneous tissue, L97.912, in a patient with type 2 diabetes, E11.622. The patient was last seen a week ago. We continued Acticoat, zinc to the periwound, 4x4's and an Unna boot. She has been keeping the leg elevated and doing gastrocnemius muscle exercises. She has had no problems over the past week and denies any changes in medications, allergies or review of systems. OBJECTIVE: 
VITAL SIGNS:  Her temperature is 96.7, pulse 91, respirations 16, blood pressure 133/73. WOUND EXAMINATION:  The wound dimensions last week were 2.4 x 1.1 x 0.1 cm. Today, the wound is starting to narrow in and measures 2.4 x 0.8 x 0.1 cm. The wound is covered with slough and hypergranular tissue. It is recommended that the wound be debrided and cauterized. I explained the risks and benefits. The patient understood and wished to proceed. Therefore, topical Xylocaine 4% gel was applied for 10 minutes. Using a 5-mm ring curette, the entire wound was debrided of all devitalized tissue down to nice, healthy bleeding granulation tissue. Hemostasis was achieved with firm pressure until dry. The wound was then scrubbed with Hibiclens, followed by normal saline. Post-debridement dimensions are 2.5 x 0.9 x 0.2 cm. ASSESSMENT AND PLAN:  We are going to continue with Acticoat Flex 7 along with zinc to the periwound, and then an [employer]. The patient will keep her legs elevated. She will do calf muscle exercise hourly while awake, and she will return to see us in 1 week. All questions were answered. Her condition on discharge is stable. MD SHEEBA Arora/[email or web address] 
D:  04/26/2019 9:03 
T:  04/26/2019 9:13 
JOB #:  [id number]

## 2019-04-26 NOTE — WOUND CARE
04/26/19 0915 Wound Leg Lower Right;Lateral  
Date First Assessed/Time First Assessed: 11/14/18 0830   POA: Yes  Wound Type: Pressure injury  Location: Leg Lower  Orientation: Right;Lateral  
Dressing Type Applied  
(zinc to periwound, acticoat, UNNA boot) Discharged from wound center, ambulatory, Pain 0/10. Has aot for follow up with MD in 1 week.

## 2019-05-03 ENCOUNTER — HOSPITAL ENCOUNTER (OUTPATIENT)
Dept: WOUND CARE | Age: 59
Discharge: HOME | End: 2019-05-03
Payer: COMMERCIAL

## 2019-05-03 VITALS
SYSTOLIC BLOOD PRESSURE: 156 MMHG | DIASTOLIC BLOOD PRESSURE: 79 MMHG | HEART RATE: 86 BPM | RESPIRATION RATE: 16 BRPM | TEMPERATURE: 97 F

## 2019-05-03 DIAGNOSIS — S81.811D LACERATION OF RIGHT LEG EXCLUDING THIGH, SUBSEQUENT ENCOUNTER: ICD-10-CM

## 2019-05-03 PROCEDURE — 74011000250 HC RX REV CODE- 250: Performed by: OTOLARYNGOLOGY

## 2019-05-03 PROCEDURE — 11042 DBRDMT SUBQ TIS 1ST 20SQCM/<: CPT

## 2019-05-03 RX ADMIN — Medication: at 08:27

## 2019-05-03 NOTE — WOUND CARE
OUTPATIENT WOUND CARE DISCHARGE NOTE 
 
 
 05/03/19 8894 Wound Leg Right; Lower Date First Assessed/Time First Assessed: 11/06/13 2100   Wound Type: (c) Other (comment)  Location: Leg  Wound Description (Optional): Cellulitus. Beefy red w/ruptured blisters. Orientation: Right; Lower Cleansing and Cleansing Agents  Chlorhexidine gluconate 4%;Normal saline Dressing Type Applied ABD pad;Silver products; Unna boot;Zinc based paste (Acticoat, Zinc cream to periwound,ABD pad, [employer] Wound Dressing Applied - Per order, as noted above. Pain - Denies pain. Ambulatory Aid- None Accompanied by - Self Discharge Instructions Reviewed. Instructed to call The [address] [place] [address] Road with any questions or concerns. Patient Verbalizes understanding. Follow Up Appointments - 1 week.

## 2019-05-03 NOTE — PROGRESS NOTES
[facility] 
WOUND CARE PROGRESS NOTE Name:  Leonardo Desai 
MR#:  [id number] :  1960 ACCOUNT #:  [de-identified] DATE OF SERVICE:  2019 SUBJECTIVE:  The patient returns for treatment of a wound to the right lateral leg, which was originally a laceration without foreign body, S81.811D, which is a venous leg ulcer I87.311, which extends down to subcutaneous tissue, L97.912, in a patient with type 2 diabetes E11.622. The patient had a good week. She had no problems and no changes in her medications, allergies or review of systems. OBJECTIVE: 
VITAL SIGNS:  Her temperature today is 97, pulse 86, respirations 16, blood pressure 156/79. WOUND EXAMINATION:  The wound dimensions today are improved at 2.1 x 0.8 x 0.1 cm. There is a very narrow area in the distal third of the wound where the wound is starting to come together well. The surface of the wound is covered with devitalized tissue and slough and it is recommended that the wound be debrided and cauterized with silver nitrate. I explained the risks and benefits. The patient understood and wished to proceed. Therefore, topical Xylocaine 4% gel was applied for 10 minutes. Using a 5-mm ring curette, the surface of the wound was debrided down to poorly vascularized subcutaneous tissue. The edges of the wound were stripped out to get back to healthy actively growing skin edges. The post debridement dimensions were 2.2 x 0.9 x 0.2 cm. The wound and edges were then cauterized with silver nitrate. This was then irrigated clear. The wound was scrubbed with Hibiclens followed by normal saline. ASSESSMENT AND PLAN:  The patient will continue to wear 15-20 mmHg sport stocking on the left leg. We are going to continue Acticoat Flex 7, zinc to the periwound, and an Unna boot to the right leg. The patient will keep her leg elevated.   She will do gastrocnemius muscle exercise hourly while awake and she understands as long the wound continues to heal, we will stay with the same dressing modality. If the wound stalls, we will then go to Endoform as a the primary dressing. All questions were answered. Her condition on discharge is stable. MD SHEEBA Calles/[email or web address] 
D:  05/03/2019 8:47 T:  05/03/2019 8:56 JOB #:  [id number]

## 2019-05-06 DIAGNOSIS — I48.0 PAF (PAROXYSMAL ATRIAL FIBRILLATION) (HCC): ICD-10-CM

## 2019-05-07 DIAGNOSIS — I48.0 PAF (PAROXYSMAL ATRIAL FIBRILLATION) (HCC): Primary | ICD-10-CM

## 2019-05-07 LAB
INR PPP: 2.7 (ref 0.8–1.2)
PROTHROMBIN TIME: 26.8 SEC (ref 9.1–12)

## 2019-05-07 NOTE — PROGRESS NOTES
Inform patient INR is therapeutic. Confirm current dose of warfarin 4 mg tablets 1 tablet on Mondays and Fridays and a half tablet the other 5 days . Talia Quijano No change in warfarin dose. Repeat in one month. Patient informed in My Chart.

## 2019-05-10 ENCOUNTER — HOSPITAL ENCOUNTER (OUTPATIENT)
Dept: WOUND CARE | Age: 59
Discharge: HOME | End: 2019-05-10
Payer: COMMERCIAL

## 2019-05-10 VITALS
TEMPERATURE: 97.7 F | HEART RATE: 66 BPM | RESPIRATION RATE: 16 BRPM | SYSTOLIC BLOOD PRESSURE: 171 MMHG | DIASTOLIC BLOOD PRESSURE: 84 MMHG

## 2019-05-10 PROCEDURE — 11042 DBRDMT SUBQ TIS 1ST 20SQCM/<: CPT

## 2019-05-10 PROCEDURE — 74011000250 HC RX REV CODE- 250: Performed by: OTOLARYNGOLOGY

## 2019-05-10 RX ADMIN — Medication: at 08:54

## 2019-05-10 NOTE — PROGRESS NOTES
[address] 
WOUND CARE PROGRESS NOTE Name:  Turner Otero 
MR#:  [id number] :  1960 ACCOUNT #:  [de-identified] DATE OF SERVICE:  05/10/2019 SUBJECTIVE:  The patient returns for treatment of the wound of the right lower extremity S81.811D, which is due to a laceration without foreign body, with a venous hypertension ulcer I87.311, L97.912 in a patient with type 2 diabetes E11.622. Unfortunately, the patient  fell up a flight of steps last Friday while carrying a box, and suffered ecchymoses of the right face and lacerations of her right forearm, and was seen in the emergency room and did not require further treatment. She has had no issues referable to the wound of the right leg nor has she had changes in medicines or allergy since then. OBJECTIVE:  Her temperature is 97.7, pulse 66, respirations 16, blood pressure 171/86. The wound dimensions are unchanged today at 2.1 x 0.8 x 0.1 cm. The edges of the wound are matured epibole. The surface wound is covered with a layer of slough and it is recommended the wound be debrided. I explained the risks and benefits. The patient understood and wished to proceed. Therefore, topical Xylocaine was applied for 10 minutes. Using a 5-mm ring curette, the surface of the wound was debrided of all devitalized tissue. The edges of the wound were circumferentially stripped out to get back to actively growing skin. Hemostasis was achieved with gentle pressure until dry. The wound was scrubbed with Hibiclens followed by normal saline. Post debridement dimensions were 2.2 x 1.0 x 0.1 cm. ASSESSMENT/PLAN:  Since the wound has stalled, we are going to change the dressing to Endoform, which was applied today. It was moistened with sterile saline. This was covered with Hydrofera blue and followed by an Unna boot.   The patient was again instructed to keep her legs elevated and to do gastrocnemius muscle exercise hourly while awake. She will return to see me in 1 week. All questions were answered. Her condition on discharge is stable MD SHEEBA Armendariz/[email or web address] 
D:  05/10/2019 9:21 T:  05/10/2019 9:32 
JOB #:  [id number]

## 2019-05-10 NOTE — WOUND CARE
05/10/19 7925 Wound Leg Lower Right;Lateral  
Date First Assessed/Time First Assessed: 11/14/18 0830   POA: Yes  Wound Type: Pressure injury  Location: Leg Lower  Orientation: Right;Lateral  
Dressing Status  Removed Dressing Type  Unna boot Wound Length (cm) 2.1 cm Wound Width (cm) 0.8 cm Wound Depth (cm) 0.1 Wound Surface area (cm^2) 1.68 cm^2 Change in Wound Size % 97.08 Condition of [facility] Condition of Edges Open Drainage Amount  Moderate Drainage Color Serosanguinous Wound Odor None Periwound Skin Condition Intact Cleansing and Cleansing Agents  Normal saline; Soap and water Visit Vitals /84 Pulse 66 Temp 97.7 °F (36.5 °C) Resp 16 RLE Peripheral Vascular Capillary Refill: Less than/equal to 3 seconds (05/10/19 0850) Color: Appropriate for race (05/10/19 0850) Temperature: Warm (05/10/19 0850) Sensation: Present (05/10/19 0850) Pedal Pulse: Palpable (05/10/19 0850) Circumference of Calf (cm): 42 cm (05/10/19 0850) Location of Measurement (Calf): Mid  (05/10/19 0850) Circumference of Ankle (cm): 22 cm (05/10/19 0850) Location of Measurement (Ankle): Upper  (05/10/19 0850)

## 2019-05-10 NOTE — WOUND CARE
Patient discharged to home ambulatory, denied pain at time of discharge. She will return to clinic in one week.

## 2019-05-17 ENCOUNTER — HOSPITAL ENCOUNTER (OUTPATIENT)
Dept: WOUND CARE | Age: 59
Discharge: HOME | End: 2019-05-17
Payer: COMMERCIAL

## 2019-05-17 VITALS
RESPIRATION RATE: 16 BRPM | SYSTOLIC BLOOD PRESSURE: 168 MMHG | TEMPERATURE: 97.7 F | HEART RATE: 62 BPM | DIASTOLIC BLOOD PRESSURE: 81 MMHG

## 2019-05-17 PROCEDURE — 74011000250 HC RX REV CODE- 250: Performed by: OTOLARYNGOLOGY

## 2019-05-17 PROCEDURE — 11042 DBRDMT SUBQ TIS 1ST 20SQCM/<: CPT

## 2019-05-17 RX ADMIN — Medication: at 08:49

## 2019-05-17 NOTE — PROGRESS NOTES
[address] 
WOUND CARE PROGRESS NOTE Name:  Jerome Thorpe 
MR#:  [id number] :  1960 ACCOUNT #:  [de-identified] DATE OF SERVICE:  2019 SUBJECTIVE:  The patient returns for treatment of a laceration without foreign body of the right lateral leg S81.811D, with a venous hypertension ulcer I87.311, which extends down to subcutaneous tissue L97.912, in a patient with type 2 diabetes E11.622. Last week, the wound had stalled with a firm epibole. We switched the dressing to Endoform, normal saline, Hydrofera blue and an Unna boot. The patient had a good week. She denies any problems or changes in medications, allergies or review of systems. OBJECTIVE:  The temperature today is 97.7, pulse 62, respirations 16, blood pressure 168/81. Examination of the wound shows that it is shorter and measures 1.8 x 0.8 x 0.1 cm. The distal part of the wound is narrowing but has not yet grown across the narrow edges of the two edges of the wound. The epibole is much softer, showing a good response to the Endoform. The surface of the wound is covered with a thin layer of slough and it is recommended that the wound be debrided. I explained the risks and benefits. The patient understood and wished to proceed. Therefore, topical Xylocaine 4% gel was applied for 10 minutes. Using a 5-mm ring curette, the wound was debrided of all devitalized tissue down to nice healthy bleeding granulation tissue in the subcutaneous space. Post debridement dimensions are 1.9 x 0.9 x 0.2 cm. Hemostasis was achieved with gentle pressure until dry. The wound was scrubbed with Hibiclens followed by normal saline. ASSESSMENT/PLAN:  We are going to stack 2 pieces of Endoform over the wound, moisten them with saline, cover with Hydrofera blue and reapply the [employer]. The patient will continue to keep her leg elevated.   She will continue to do gastrocnemius muscle exercise hourly while awake and she will return in 1 week. All questions were answered. Her condition on discharge is stable. MD SHEEBA Armendariz/[email or web address] 
D:  05/17/2019 9:07 
T:  05/17/2019 9:17 JOB #:  [id number]

## 2019-05-17 NOTE — WOUND CARE
05/17/19 0840 Wound Leg Lower Right;Lateral  
Date First Assessed/Time First Assessed: 11/14/18 0830   POA: Yes  Wound Type: Pressure injury  Location: Leg Lower  Orientation: Right;Lateral  
Dressing Status  Removed Dressing Type  Unna boot; Collagens/cell matrix; Foam  
Non-Pressure Injury Full thickness (subcut/muscle) Wound Length (cm) 1.8 cm Wound Width (cm) 0.8 cm Wound Depth (cm) 0.1 Wound Surface area (cm^2) 1.44 cm^2 Change in Wound Size % 97.5 Condition of Base Slough;Pink Condition of Edges Open Tissue Type Pink;Yellow Drainage Amount  Moderate Drainage Color Serous Wound Odor None Periwound Skin Condition Intact Cleansing and Cleansing Agents  Normal saline Visit Vitals /81 Pulse 62 Temp 97.7 °F (36.5 °C) Resp 16

## 2019-05-17 NOTE — WOUND CARE
05/17/19 0915 Wound Leg Lower Right;Lateral  
Date First Assessed/Time First Assessed: 11/14/18 0830   POA: Yes  Wound Type: Pressure injury  Location: Leg Lower  Orientation: Right;Lateral  
Dressing Type Applied Collagens/cell matrix;Foam;Unna boot 
(endoform 2 peices stacked, HFBR) Dressing Change by Germania Khan. Patient was discharged with Self to home and was ambulatory. In stable condition with c/o pain:_0_/10_

## 2019-05-23 DIAGNOSIS — F41.1 ANXIETY AS ACUTE REACTION TO GROSS STRESS: ICD-10-CM

## 2019-05-23 DIAGNOSIS — F43.0 ANXIETY AS ACUTE REACTION TO GROSS STRESS: ICD-10-CM

## 2019-05-24 ENCOUNTER — HOSPITAL ENCOUNTER (OUTPATIENT)
Dept: WOUND CARE | Age: 59
Discharge: HOME | End: 2019-05-24
Payer: COMMERCIAL

## 2019-05-24 VITALS
HEART RATE: 82 BPM | RESPIRATION RATE: 16 BRPM | TEMPERATURE: 97.8 F | SYSTOLIC BLOOD PRESSURE: 168 MMHG | DIASTOLIC BLOOD PRESSURE: 81 MMHG

## 2019-05-24 PROCEDURE — 97597 DBRDMT OPN WND 1ST 20 CM/<: CPT

## 2019-05-24 PROCEDURE — 74011000250 HC RX REV CODE- 250: Performed by: OTOLARYNGOLOGY

## 2019-05-24 RX ORDER — BUSPIRONE HYDROCHLORIDE 10 MG/1
TABLET ORAL
Qty: 60 TAB | Refills: 10 | Status: SHIPPED | OUTPATIENT
Start: 2019-05-24

## 2019-05-24 RX ADMIN — Medication: at 09:23

## 2019-05-24 NOTE — WOUND CARE
05/24/19 0095 Wound Leg Lower Right;Lateral  
Date First Assessed/Time First Assessed: 11/14/18 0830   POA: Yes  Wound Type: Pressure injury  Location: Leg Lower  Orientation: Right;Lateral  
Dressing Status  Removed Dressing Type  Collagens/cell matrix 
John gill Non-Pressure Injury Full thickness (subcut/muscle) Wound Length (cm) 1.6 cm Wound Width (cm) 0.5 cm Wound Depth (cm) 0.1 Wound Surface area (cm^2) 0.8 cm^2 Change in Wound Size % 98.61 Condition of Base Slough;Pink Condition of Edges Open Tissue Type Pink;Yellow Drainage Amount  Small Drainage Color Serous Wound Odor None Periwound Skin Condition Intact Cleansing and Cleansing Agents  Chlorhexidine gluconate 4% Visit Vitals /81 (BP 1 Location: Left arm, BP Patient Position: Sitting) Pulse 82 Temp 97.8 °F (36.6 °C) Resp 16

## 2019-05-24 NOTE — PROGRESS NOTES
[address] 
WOUND CARE PROGRESS NOTE Name:  Jerome Thorpe 
MR#:  [id number] :  1960 ACCOUNT #:  [de-identified] DATE OF SERVICE:  2019 SUBJECTIVE:  The patient returns for treatment of a wound of the right lower extremity, which was originally a laceration without foreign body S81.811D, with a venous hypertension ulcer I87.311, which originally went down to subcutaneous tissue L97.912, in a patient with type 2 diabetes E11.622. The patient had a good week. She had no changes in her medications, allergies or review of systems. Last week we stacked 2 sheets of Endoform followed by normal saline and Hydrofera blue and an Unna boot. She has been keeping her leg elevated and she has been doing calf muscle exercises to improve venous outflow. OBJECTIVE:  Her temperature is 97.8, pulse 82, respirations 16, blood pressure 168/81. The wound dimensions today are improved to 1.6 x 0.5 x 0.1 cm. There is a small area of undermining at the most distal portion of the wound on the lateral edge. It is recommend that the wound be debrided of all devitalized tissue and the undermined area be removed. I explained the risks and benefits. The patient understood and wished to proceed. Therefore, topical 4% Xylocaine gel was applied for 10 minutes. Using a 5-mm ring curette, the surface of the wound was debrided of all devitalized tissue. The edges of the wound were stripped out to get back to healthy actively growing skin edges. The area of undermining was removed back to healthy skin edges. Hemostasis was achieved with gentle pressure until dry. Post debridement dimensions are 1.8 x 0.6 x 0.1 cm. ASSESSMENT/PLAN:  We are re-applying 2 layers of Endoform followed by normal saline, Hydrofera blue and an Unna boot. The patient will keep her leg elevated as much as possible.   She will continue gastrocnemius muscle exercise hourly while awake. She will return for a nursing visit in 1 week, and she will see me again in 2 weeks. All questions were answered. Her condition on discharge is stable. Johanny Duran MD 
 
 
[email or web address] 
D:  05/24/2019 9:47 T:  05/24/2019 9:57 JOB #:  [id number]

## 2019-05-24 NOTE — WOUND CARE
05/24/19 9873 Wound Leg Lower Right;Lateral  
Date First Assessed/Time First Assessed: 11/14/18 0830   POA: Yes  Wound Type: Pressure injury  Location: Leg Lower  Orientation: Right;Lateral  
Dressing Type Applied Collagens/cell matrix (HFBR, [employer]) Discharged from wound center, ambulatory, pain 0/10. Has follow up apt to see MD in 1 week.

## 2019-05-30 ENCOUNTER — OFFICE VISIT (OUTPATIENT)
Dept: INTERNAL MEDICINE CLINIC | Facility: CLINIC | Age: 59
End: 2019-05-30

## 2019-05-30 VITALS
OXYGEN SATURATION: 95 % | BODY MASS INDEX: 38.58 KG/M2 | DIASTOLIC BLOOD PRESSURE: 67 MMHG | HEART RATE: 66 BPM | RESPIRATION RATE: 12 BRPM | HEIGHT: 69 IN | WEIGHT: 260.5 LBS | SYSTOLIC BLOOD PRESSURE: 144 MMHG | TEMPERATURE: 98.2 F

## 2019-05-30 DIAGNOSIS — L03.818 CELLULITIS OF OTHER SPECIFIED SITE: Primary | ICD-10-CM

## 2019-05-30 DIAGNOSIS — R79.1 SUPRATHERAPEUTIC INR: ICD-10-CM

## 2019-05-30 DIAGNOSIS — I48.0 PAF (PAROXYSMAL ATRIAL FIBRILLATION) (HCC): ICD-10-CM

## 2019-05-30 DIAGNOSIS — L98.491 SKIN ULCERATION, LIMITED TO BREAKDOWN OF SKIN (HCC): ICD-10-CM

## 2019-05-30 LAB
INR BLD: 4.9
PT POC: 59.3 SECONDS
VALID INTERNAL CONTROL?: YES

## 2019-05-30 RX ORDER — CEPHALEXIN 500 MG/1
500 CAPSULE ORAL 4 TIMES DAILY
Qty: 30 CAP | Refills: 0 | Status: SHIPPED | OUTPATIENT
Start: 2019-05-30 | End: 2019-07-10 | Stop reason: ALTCHOICE

## 2019-05-30 RX ORDER — WARFARIN 4 MG/1
TABLET ORAL
Qty: 45 TAB | Refills: 2 | Status: SHIPPED | OUTPATIENT
Start: 2019-05-30 | End: 2019-06-06

## 2019-05-30 RX ORDER — DOXYCYCLINE 100 MG/1
100 CAPSULE ORAL 2 TIMES DAILY
Qty: 20 CAP | Refills: 0 | Status: SHIPPED | OUTPATIENT
Start: 2019-05-30 | End: 2019-07-10 | Stop reason: ALTCHOICE

## 2019-05-30 NOTE — PATIENT INSTRUCTIONS
Cephalexin 500 mg 3 times a day for 10 days  Doxycycline 100 mg twice a day for 10 days. Do not take calcium, aluminum or magnesium containing products such as antacids or laxative while taking warfarin. Keep wound covered. Recommend Xeroform gauze and Tegadern  Change warfarin 4 mg to none today or tomorrow, then half tablet daily. Return in one week, sooner if worse. Cellulitis: Care Instructions  Your Care Instructions    Cellulitis is a skin infection caused by bacteria, most often strep or staph. It often occurs after a break in the skin from a scrape, cut, bite, or puncture, or after a rash. Cellulitis may be treated without doing tests to find out what caused it. But your doctor may do tests, if needed, to look for a specific bacteria, like methicillin-resistant Staphylococcus aureus (MRSA). The doctor has checked you carefully, but problems can develop later. If you notice any problems or new symptoms, get medical treatment right away. Follow-up care is a key part of your treatment and safety. Be sure to make and go to all appointments, and call your doctor if you are having problems. It's also a good idea to know your test results and keep a list of the medicines you take. How can you care for yourself at home? · Take your antibiotics as directed. Do not stop taking them just because you feel better. You need to take the full course of antibiotics. · Prop up the infected area on pillows to reduce pain and swelling. Try to keep the area above the level of your heart as often as you can. · If your doctor told you how to care for your wound, follow your doctor's instructions. If you did not get instructions, follow this general advice:  ? Wash the wound with clean water 2 times a day. Don't use hydrogen peroxide or alcohol, which can slow healing. ? You may cover the wound with a thin layer of petroleum jelly, such as Vaseline, and a nonstick bandage. ?  Apply more petroleum jelly and replace the bandage as needed. · Be safe with medicines. Take pain medicines exactly as directed. ? If the doctor gave you a prescription medicine for pain, take it as prescribed. ? If you are not taking a prescription pain medicine, ask your doctor if you can take an over-the-counter medicine. To prevent cellulitis in the future  · Try to prevent cuts, scrapes, or other injuries to your skin. Cellulitis most often occurs where there is a break in the skin. · If you get a scrape, cut, mild burn, or bite, wash the wound with clean water as soon as you can to help avoid infection. Don't use hydrogen peroxide or alcohol, which can slow healing. · If you have swelling in your legs (edema), support stockings and good skin care may help prevent leg sores and cellulitis. · Take care of your feet, especially if you have diabetes or other conditions that increase the risk of infection. Wear shoes and socks. Do not go barefoot. If you have athlete's foot or other skin problems on your feet, talk to your doctor about how to treat them. When should you call for help? Call your doctor now or seek immediate medical care if:    · You have signs that your infection is getting worse, such as:  ? Increased pain, swelling, warmth, or redness. ? Red streaks leading from the area. ? Pus draining from the area. ? A fever.     · You get a rash.    Watch closely for changes in your health, and be sure to contact your doctor if:    · You do not get better as expected. Where can you learn more? Go to http://dona-windy.info/. Jean Marie Harper in the search box to learn more about \"Cellulitis: Care Instructions. \"  Current as of: April 17, 2018  Content Version: 11.9  © 7186-7752 [employer]. Care instructions adapted under license by [employer] (which disclaims liability or warranty for this information).  If you have questions about a medical condition or this instruction, always ask your healthcare professional. [address] any warranty or liability for your use of this information.

## 2019-05-30 NOTE — PROGRESS NOTES
Bhanu Freeman  Identified pt with two pt identifiers(name and ). Chief Complaint   Patient presents with    Breast Problem     red spot left        Reviewed record In preparation for visit and have obtained necessary documentation. Has info on advanced directive but has not filled them out. 1. Have you been to the ER, urgent care clinic or hospitalized since your last visit? Alyssa Michelle 2019     2. Have you seen or consulted any other health care providers outside of the [address] [place] [address] since your last visit? Include any pap smears or colon screening. Wound Care, Dr Dedra Schilling, Dr Daria Cueto. Vitals reviewed with provider.     Health Maintenance reviewed:     Health Maintenance Due   Topic    Shingrix Vaccine Age 49> (1 of 2)    Pneumococcal 0-64 years (2 of 3 - PCV13)    EYE EXAM RETINAL OR DILATED           Wt Readings from Last 3 Encounters:   19 260 lb 8 oz (118.2 kg)   19 261 lb 12.8 oz (118.8 kg)   19 269 lb 8 oz (122.2 kg)        Temp Readings from Last 3 Encounters:   19 98.2 °F (36.8 °C) (Oral)   19 97.8 °F (36.6 °C)   19 97.7 °F (36.5 °C)        BP Readings from Last 3 Encounters:   19 144/67   19 168/81   19 168/81        Pulse Readings from Last 3 Encounters:   19 66   19 82   19 62        Vitals:    19 1414   BP: 144/67   Pulse: 66   Resp: 12   Temp: 98.2 °F (36.8 °C)   TempSrc: Oral   SpO2: 95%   Weight: 260 lb 8 oz (118.2 kg)   Height: 5' 9\" (1.753 m)   PainSc:   3   PainLoc: Breast          Learning Assessment:   :       Learning Assessment 3/25/2014   PRIMARY LEARNER Patient   HIGHEST LEVEL OF EDUCATION - PRIMARY LEARNER  2 YEARS OF COLLEGE   BARRIERS PRIMARY LEARNER NONE   CO-LEARNER CAREGIVER No   PRIMARY LANGUAGE ENGLISH    NEED No   LEARNER PREFERENCE PRIMARY DEMONSTRATION   LEARNING SPECIAL TOPICS Does does a pacemaker any noise   ANSWERED BY patient   RELATIONSHIP SELF Depression Screening:   :       3 most recent PHQ Screens 3/11/2019   Little interest or pleasure in doing things Not at all   Feeling down, depressed, irritable, or hopeless Not at all   Total Score PHQ 2 0        Fall Risk Assessment:   :     No flowsheet data found. Abuse Screening:   :     No flowsheet data found.      ADL Screening:   :       ADL Assessment 11/17/2014   Feeding yourself No Help Needed   Getting from bed to chair No Help Needed   Getting dressed No Help Needed   Bathing or showering No Help Needed   Walk across the room (includes cane/walker) No Help Needed   Using the telphone No Help Needed   Taking your medications No Help Needed   Preparing meals No Help Needed   Managing money (expenses/bills) No Help Needed   Moderately strenuous housework (laundry) No Help Needed   Shopping for personal items (toiletries/medicines) No Help Needed   Shopping for groceries No Help Needed   Driving No Help Needed   Climbing a flight of stairs No Help Needed   Getting to places beyond walking distances No Help Needed

## 2019-05-30 NOTE — PROGRESS NOTES
Patient presents to wound clinic for: Heather Yadav is a 60 y.o. female who presents for subsequent wound care by myself of the following:  Right lower leg wound. Patient has a history of diabetes mellitus, HTN, CKD, HLD, obesity, afib, SSS. She has previously by evaluated by Dr. Yun Roberts for this wound. She relates that she initially injured the  extremity a little over a month ago by bumping into something in her home (relates that  is a hoarder than that she is frequently bumping into things. Relates that the area is decreasing in size. Has only been using one tubigrip instead of two because she relates that it hurts otherwise. Denies any recent infection to the wound. Patient relates that she has been standing a lot at work lately, making it difficult for her to elevate the legs as often as she would like. She does note that she will have some time off work now due to the holidays and that when she does return to work she will have more time to elevate.      Pertinent Medical History:  Past Medical History:   Diagnosis Date    Acquired lymphedema     Right arm    Arrhythmia     Asthma     Atrial fibrillation (HCC)     Dr. Charlotte Brar and Dr. Sun Ferguson Atrial flutter ([facility] [address]     Cancer [facility])     bilateral breast    Chronic kidney disease     Diabetes mellitus type II     Dizziness     Essential hypertension     Hyperlipidemia     Hypertension     Long term (current) use of anticoagulants     [facility] 3/14/2012    Osteoarthritis     Pacemaker     Sick sinus syndrome [facility])      Past Surgical History:   Procedure Laterality Date    ABDOMEN SURGERY PROC UNLISTED      gastric bypass    GASTRIC BYPASS,OBESE<150CM ASW-EN-Y  7/08    [place]    HX AFIB ABLATION      HX CARPAL TUNNEL RELEASE      HX CERVICAL FUSION  1994    HX DILATION AND CURETTAGE  1992    HX MASTECTOMY  1998    RIGHT MODIFIED RADICAL     HX MOHS PROCEDURES  1995    right    HX ORTHOPAEDIC      ight knee surgery    HX PACEMAKER      IR INSERT TUNL CVC W PORT OVER 5 YEARS      LAMINECTOMY,CERVICAL  1994    NEEDLE BIOPSY LIVER,W OTHR [address]  8.21.07    [place] ANESTH,KNEE AREA SURGERY  1982 AND 1994    [place] TRABECULOPLASTY BY LASER SURGERY      US GUIDE ASP OVARIAN CYST ABD APPROACH  8.21.07    RIGHT      Prior to Admission medications    Medication Sig Start Date End Date Taking? Authorizing Provider   warfarin (COUMADIN) 4 mg tablet Take none today (11/30) or tomorrow (12/01), then 1 tablet on Mondays and Fridays and a half tablet the other 5 days. 11/30/18   Reyna Rivera MD   sertraline (ZOLOFT) 50 mg tablet TAKE ONE AND ONE-HALF (1 & 1/2) TABLET BY MOUTH DAILY 11/16/18   Reyna Rivera MD   insulin glargine (LANTUS U-100 INSULIN) 100 unit/mL injection 20 Units by SubCUTAneous route daily. 11/13/18   Reyna Rivera MD   bumetanide (BUMEX) 1 mg tablet TAKE ONE TABLET BY MOUTH TWICE A DAY 9/4/18   Angy Taylor MD   hydrALAZINE (APRESOLINE) 100 mg tablet TAKE ONE TABLET BY MOUTH THREE TIMES A DAY 7/24/18   Reyna Rivera MD   metoprolol succinate (TOPROL-XL) 100 mg tablet TAKE TWO TABLETS BY MOUTH DAILY 7/23/18   Reyna Rivera MD   doxazosin (CARDURA) 4 mg tablet TAKE ONE TABLET BY MOUTH ONCE NIGHTLY 6/11/18   Reyna Rivera MD   busPIRone (BUSPAR) 10 mg tablet Take one po twice daily 5/15/18   Lucy Flores NP   TRUEPLUS INSULIN 0.5 mL 31 gauge x 5/16 syrg  2/14/18   Provider, Historical   potassium chloride SR (KLOR-CON 10) 10 mEq tablet TAKE ONE TABLET BY MOUTH DAILY 3/7/18   Angy Taylor MD   cloNIDine HCl (CATAPRES) 0.3 mg tablet TAKE ONE TABLET BY MOUTH TWICE A DAY 2/26/18   Reyna Rivera MD   metolazone (ZAROXOLYN) 5 mg tablet Take 1 Tab by mouth daily as needed (take if develop increased LE edema, weight gain > 5 pounds. ). 4/10/14   Ethan De La Paz NP   cholecalciferol, VITAMIN D3, (VITAMIN D3) 5,000 unit tab tablet Take 5,000 Units by mouth daily. Provider, Historical   vitamin A 8,000 unit capsule Take 8,000 Units by mouth daily. Provider, Historical   ACETAMINOPHEN/DIPHENHYDRAMINE (TYLENOL PM PO) Take 2 Tabs by mouth nightly as needed. Provider, Historical   insulin lispro (HUMALOG) 100 unit/mL kwikpen 2 units with dinner for sugars over 200 1/3/14   Sean Galeano MD   cyanocobalamin (VITAMIN B-12) 1,000 mcg sublingual tablet Take 1,000 mcg by mouth daily. Provider, Historical   calcium carbonate (TUMS) 200 mg calcium (500 mg) Chew Take 1 Tab by mouth three (3) times daily (after meals). Provider, Historical     Allergies   Allergen Reactions    Ace Inhibitors Cough    Amlodipine Swelling    Avapro [Irbesartan] Unable to Obtain    Bactrim [Sulfamethoxazole-Trimethoprim] Other (comments)     Sore mouth    Captopril Cough    Carvedilol Diarrhea    Contrast Agent [Iodine] Itching    Fish Containing Products Hives    Morphine Nausea and Vomiting and Swelling    Penicillins Hives    Pravachol [Pravastatin] Nausea Only    Seafood [Shellfish Containing Products] Hives    Singulair [Montelukast] Other (comments)     palpitations     Family History   Problem Relation Age of Onset    Cancer Mother     Heart Disease Mother     Alcohol abuse Father     Diabetes Brother     Hypertension Brother      Social History     Socioeconomic History    Marital status:      Spouse name: Not on file    Number of children: Not on file    Years of education: Not on file    Highest education level: Not on file   Social Needs    Financial resource strain: Not on file    Food insecurity - worry: Not on file    Food insecurity - inability: Not on file   [facility] needs - medical: Not on file   [facility] needs - non-medical: Not on file   Occupational History    Not on file   Tobacco Use    Smoking status: Never Smoker    Smokeless tobacco: Never Used   Substance and Sexual Activity    Alcohol use:  Yes Comment: rare    Drug use: No    Sexual activity: Not on file   Other Topics Concern    Not on file   Social History Narrative    Not on file       Vitals:    12/18/18 1509   BP: 145/73   Pulse: 89   Resp: 16   Temp: 98 °F (36.7 °C)       Review of Systems:    Gen: No fever, chills, malaise, weight loss/gain. Heent: No headache, rhinorrhea, epistaxis, ear pain, hearing loss, sinus pain, neck pain/stiffness, sore throat. Heart: No chest pain, palpitations, CHOW, pnd, or orthopnea. Resp: No cough, hemoptysis, wheezing and shortness of breath. GI: No nausea, vomiting, diarrhea, constipation, melena or hematochezia. : No urinary obstruction, dysuria or hematuria. Derm: see below  Musc/skeletal: no bone or joint complains. Vasc: No edema, cyanosis or claudication. Endo: No heat/cold intolerance, no polyuria,polydipsia or polyphagia. Neuro: No unilateral weakness, numbness, tingling. No seizures. Heme: No easy bruising or bleeding. Wound Description:   Wound Type: Pressure/Traumatic   Indication: Chronic >30days   Status:deteriorating   Measurements:    Wound Length: 8.2 cm      Wound Width :6 cm      Wound Depth :0.1   Tissue Type:   Epithelial: without necrosis  Granulation:without necrosis  Subcutaneous: without necrosis  Slough: yes  Eschar: NA    Gangrene: NA   Drainage: Increased,  Significant weeping noted around wound and increased edema    Debridement: required today to promote wound healingRight  Pressure Calf To Fat Layer    Character of Ulcer: stable    Indication for Debridement: Slough, Exudate, Abnormal wound edge and Abnormal Wound base     Pre debridement measurements: 8.2cm x 6cm x 0.1 cm    Risks of procedure were discussed with patient. Consent has been signed.      Anesthetic: Lidocaine 4% topical cream     Level of Debridement:subcutaneous tissue    Tissue and/or Material removed: Exudate, Fibrin/ Slough and Subcutaneous    Type of Tissue: Viable      Pre-debridement severity: Fat Layer Exposed     Post debridement severity: Fat Layer exposed    Instrument(s) used: Curette    Bleeding controlled with: Pressure    Estimated blood loss: Minimal    Pre debridement measurements: 8.3 cm x 6.1 cm x 0.2 cm    Post procedural pain: mild    Patient tolerated procedure well. Infection: no      Edema: yes   Status: improved   Edema is being managed with: tubigrip   Patient educated on the importance of edema control yes    Lower Extremity Circulation   Previous studies indicate no history of vascular disease    Compression: yes   Status: Continue   Type: tubigrip F  Patient Compliant: yes    Neuropathy: NA    Offloading: NA    Nicotine/Tobacco Use: No      Nutrition:  Status obese  Diabetic: type 2  Glucose Control: not controlled  Recent Hgb A1C: 8.4    Assessment:  1. Venous leg ulcer of the RLE secondary to prior trauma  2. Diabetes mellitus with CKD    Plan:  -Debridement as noted above  -Continue dressing daily with Guavas ag, sterile gauze dressing.   - Wear single layer tubigrip to both legs daily. Explained that it would be more benefit to wear double tubigrip, but pt refusing at this time.  -Discussed with patient that recent increase in size is due to increased edema of the lower extremities due to prolonged standing and work. Discussed the importance of edema control in regard to healing of wound. Patient relates that she has been on her feet for a significantly increased period of time at work, but that she will have time off for the holidays and is planning to rest and elevate the legs at that time.   When I discussed my concern for the amount of time that she will be on her feet when she returns to work, she relates that after the holidays she will have decreased training and duties requiring her to stand for prolonged periods of time.  -Patient in agreement with assessment and plan  -F/u 1 week Acute kidney injury superimposed on CKD
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
## 2019-05-31 ENCOUNTER — HOSPITAL ENCOUNTER (OUTPATIENT)
Dept: WOUND CARE | Age: 59
Discharge: HOME | End: 2019-05-31
Payer: COMMERCIAL

## 2019-05-31 VITALS
HEART RATE: 73 BPM | SYSTOLIC BLOOD PRESSURE: 156 MMHG | TEMPERATURE: 97.7 F | RESPIRATION RATE: 16 BRPM | DIASTOLIC BLOOD PRESSURE: 77 MMHG

## 2019-05-31 PROCEDURE — 29580 STRAPPING UNNA BOOT: CPT

## 2019-05-31 NOTE — WOUND CARE
05/31/19 0848   Wound Leg Lower Right;Lateral   Date First Assessed/Time First Assessed: 11/14/18 0830   POA: Yes  Wound Type: Pressure injury  Location: Leg Lower  Orientation: Right;Lateral   Dressing Status  Removed   Dressing Type  Collagens/cell matrix; Unna boot  (HFBR)   Drainage Amount  Small    Drainage Color Serosanguinous   Wound Odor None   Periwound Skin Condition Intact   Cleansing and Cleansing Agents  Chlorhexidine gluconate 4%   Dressing Type Applied Collagens/cell matrix; Unna boot  (endoform 2 pieces stacked, HFBR)       Pt in for nursing visit for dressing change      Patient was discharged with Self to home and was ambulatory.  In stable condition with c/o pain:_0_/10_

## 2019-06-03 DIAGNOSIS — I48.0 PAF (PAROXYSMAL ATRIAL FIBRILLATION) (HCC): ICD-10-CM

## 2019-06-05 NOTE — PROGRESS NOTES
HISTORY OF PRESENT ILLNESS  Francisco Weiss is a 60 y.o. female. HPI She presents for follow up of cellulitis and ulceration affecting left breast. She was last seen on May 30. Kayode Reasoner She was ordered cephalexin and doxycycline. She had similar occurrence a couple of years ago. Redness is less and itching is gone. She continues to dress with Xeroform and Tegaderm. She has atrial fibrillation and takes warfarin. INR on May 30 was 4.9. She admitted to not taking warfarin every day prior to detention. She started taking it regularly prior to last visit.      Patient Active Problem List   Diagnosis Code    History of breast cancer Z85.3    Asthma J45.909    Fe deficiency anemia D50.9    Status post ablation of atrial fibrillation Z98.890, Z86.79    Sick sinus syndrome ([facility]) I49.5    S/P cardiac pacemaker procedure Z95.0    Long term (current) use of anticoagulants Z79.01    Mixed hyperlipidemia E78.2    CKD (chronic kidney disease), stage IV ([facility]) V23.4    Systolic murmur D30.0    Essential hypertension I10    PAF (paroxysmal atrial fibrillation) ([facility]) I48.0    Anemia in stage 4 chronic kidney disease ([facility]) N18.4, D63.1    Uncontrolled type 2 diabetes mellitus with stage 4 chronic kidney disease, with long-term current use of insulin ([facility]) E11.22, E11.65, N18.4, Z79.4    Morbid obesity with BMI of 40.0-44.9, adult ([facility] E66.01, Z68.41    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 Novoa [facility] Atrial flutter ([facility]     Cancer [facility])     bilateral breast    Chronic kidney disease     Diabetes mellitus type II     Dizziness     Essential hypertension     Hyperlipidemia     Hypertension     Long term (current) use of anticoagulants     Morbid obesity ([facility] 3/14/2012    Osteoarthritis     Pacemaker     Sick sinus syndrome ([facility]      Past Surgical History:   Procedure Laterality Date    ABDOMEN SURGERY PROC UNLISTED      gastric bypass    GASTRIC BYPASS,OBESE<150CM SAW-EN-Y  7/08    [facility]    HX AFIB ABLATION      HX CARPAL TUNNEL RELEASE      HX CERVICAL FUSION  1994    HX DILATION AND CURETTAGE  1992    HX MASTECTOMY  1998    RIGHT MODIFIED RADICAL     HX MOHS PROCEDURES  1995    right    HX ORTHOPAEDIC      ight knee surgery    HX PACEMAKER      IR INSERT TUNL CVC W PORT OVER 5 YEARS      [address]    NEEDLE BIOPSY LIVER,W OTHR [address]  8.21.07    MS [address] AND 1994    MS TRABECULOPLASTY BY LASER SURGERY      US GUIDE ASP OVARIAN CYST ABD APPROACH  8.21.07    RIGHT      Social History     Socioeconomic History    Marital status:      Spouse name: Not on file    Number of children: Not on file    Years of education: Not on file    Highest education level: Not on file   Tobacco Use    Smoking status: Never Smoker    Smokeless tobacco: Never Used   Substance and Sexual Activity    Alcohol use: Yes     Comment: rare    Drug use: No     Family History   Problem Relation Age of Onset    Cancer Mother     Heart Disease Mother     Alcohol abuse Father     Diabetes Brother     Hypertension Brother      Allergies   Allergen Reactions    Ace Inhibitors Cough    Amlodipine Swelling    Avapro [Irbesartan] Unable to Obtain    Bactrim [Sulfamethoxazole-Trimethoprim] Other (comments)     Sore mouth    Captopril Cough    Carvedilol Diarrhea    Contrast Agent [Iodine] Itching    Fish Containing Products Hives    Morphine Nausea and Vomiting and Swelling    Penicillins Hives    Pravachol [Pravastatin] Nausea Only    Seafood [Shellfish Containing Products] Hives    Singulair [Montelukast] Other (comments)     palpitations     Current Outpatient Medications   Medication Sig Dispense Refill    warfarin (COUMADIN) 4 mg tablet Take a half tablet daily . 45 Tab 2    Bismuth Tribrom-Petrolatum,Wh (XEROFORM PETROLATUM DRESSING) 4 X 4 \" bndg 1 Pad by Apply Externally route daily. 25 Each 1    cephALEXin (KEFLEX) 500 mg capsule Take 1 Cap by mouth four (4) times daily. 30 Cap 0    doxycycline (VIBRAMYCIN) 100 mg capsule Take 1 Cap by mouth two (2) times a day. 20 Cap 0    busPIRone (BUSPAR) 10 mg tablet TAKE ONE TABLET BY MOUTH TWICE A DAY 60 Tab 10    bumetanide (BUMEX) 1 mg tablet TAKE ONE TABLET BY MOUTH TWICE A  Tab 3    potassium chloride SR (KLOR-CON 10) 10 mEq tablet TAKE ONE TABLET BY MOUTH DAILY 90 Tab 3    sertraline (ZOLOFT) 50 mg tablet TAKE ONE AND ONE-HALF (1 & 1/2) TABLET BY MOUTH DAILY 45 Tab 3    cloNIDine HCl (CATAPRES) 0.3 mg tablet TAKE ONE TABLET BY MOUTH TWICE A DAY 60 Tab 11    TRUEPLUS INSULIN 0.5 mL 31 gauge x 5/16\" syrg USE ONE SYRINGE DAILY AS NEEDED. 100 Syringe 0    hydrALAZINE (APRESOLINE) 100 mg tablet TAKE ONE TABLET BY MOUTH THREE TIMES A DAY 90 Tab 2    insulin glargine (LANTUS U-100 INSULIN) 100 unit/mL injection 20 Units by SubCUTAneous route daily. 10 mL 5    metoprolol succinate (TOPROL-XL) 100 mg tablet TAKE TWO TABLETS BY MOUTH DAILY 60 Tab 10    doxazosin (CARDURA) 4 mg tablet TAKE ONE TABLET BY MOUTH ONCE NIGHTLY 30 Tab 11    metolazone (ZAROXOLYN) 5 mg tablet Take 1 Tab by mouth daily as needed (take if develop increased LE edema, weight gain > 5 pounds. ). 30 Tab 1    cholecalciferol, VITAMIN D3, (VITAMIN D3) 5,000 unit tab tablet Take 5,000 Units by mouth daily.  vitamin A 8,000 unit capsule Take 8,000 Units by mouth daily.       ACETAMINOPHEN/DIPHENHYDRAMINE (TYLENOL PM PO) Take 2 Tabs by mouth nightly as needed.  insulin lispro (HUMALOG) 100 unit/mL kwikpen 2 units with dinner for sugars over 200 1 Package 0    cyanocobalamin (VITAMIN B-12) 1,000 mcg sublingual tablet Take 1,000 mcg by mouth daily.  calcium carbonate (TUMS) 200 mg calcium (500 mg) Chew Take 1 Tab by mouth three (3) times daily (after meals). ROS      Physical Exam   Constitutional: She is oriented to person, place, and time. She appears well-developed and well-nourished. HENT:   Head: Normocephalic and atraumatic. Eyes: Pupils are equal, round, and reactive to light. Neck: Neck supple. Cardiovascular: Normal rate, regular rhythm and normal heart sounds. Exam reveals no gallop. No murmur heard. Pulmonary/Chest: Effort normal. She has wheezes. She has no rales. Musculoskeletal: She exhibits no edema. Neurological: She is alert and oriented to person, place, and time. Skin: Skin is warm, dry and intact. No rash noted. Nursing note and vitals reviewed. Results for orders placed or performed in visit on 06/06/19   AMB POC PT/INR   Result Value Ref Range    VALID INTERNAL CONTROL POC Yes     Prothrombin time (POC) 19.4 seconds    INR POC 1.6            ASSESSMENT and PLAN    ICD-10-CM ICD-9-CM    1. Cellulitis of left breast N61.0 611.0    2. Skin ulceration, limited to breakdown of skin ([address] L98.491 707.9    3. PAF (paroxysmal atrial fibrillation) ([facility]) I48.0 427.31 AMB POC PT/INR      warfarin (COUMADIN) 4 mg tablet      PROTHROMBIN TIME + INR     Diagnoses and all orders for this visit:    1. Cellulitis of left breast  Improved. To finish antibiotics     2. Skin ulceration, limited to breakdown of skin ([address]  Area needs debridement. Will be seen at wound clinic tomorrow. If they are not able to do it, need to go back to general surgery.      3. PAF (paroxysmal atrial fibrillation) ([facility])  -     AMB POC PT/INR  -     Change warfarin (COUMADIN) 4 mg tablet; Take a half tablet daily .  -     PROTHROMBIN TIME + INR; Future 10 days      Follow-up and Dispositions    · Return in about 11 days (around 6/17/2019) for non-fasting lab.       lab results and schedule of future lab studies reviewed with patient  I have discussed the diagnosis, evaluation and treatment options and the intended plan with the patient. Patient understands and is in agreement. The patient has received an after-visit summary and questions were answered concerning future plans. I have discussed side effects and warnings of any new medications with the patient as well.

## 2019-06-06 ENCOUNTER — OFFICE VISIT (OUTPATIENT)
Dept: INTERNAL MEDICINE CLINIC | Facility: CLINIC | Age: 59
End: 2019-06-06

## 2019-06-06 VITALS
OXYGEN SATURATION: 96 % | WEIGHT: 262 LBS | TEMPERATURE: 98.6 F | BODY MASS INDEX: 38.8 KG/M2 | SYSTOLIC BLOOD PRESSURE: 149 MMHG | HEART RATE: 84 BPM | HEIGHT: 69 IN | DIASTOLIC BLOOD PRESSURE: 72 MMHG | RESPIRATION RATE: 12 BRPM

## 2019-06-06 DIAGNOSIS — I48.0 PAF (PAROXYSMAL ATRIAL FIBRILLATION) (HCC): ICD-10-CM

## 2019-06-06 DIAGNOSIS — L98.491 SKIN ULCERATION, LIMITED TO BREAKDOWN OF SKIN (HCC): ICD-10-CM

## 2019-06-06 DIAGNOSIS — N61.0 CELLULITIS OF LEFT BREAST: Primary | ICD-10-CM

## 2019-06-06 LAB
INR BLD: 1.6
PT POC: 19.4 SECONDS
VALID INTERNAL CONTROL?: YES

## 2019-06-06 RX ORDER — WARFARIN 4 MG/1
TABLET ORAL
Qty: 45 TAB | Refills: 2
Start: 2019-06-06 | End: 2019-07-10 | Stop reason: SDUPTHER

## 2019-06-06 NOTE — PATIENT INSTRUCTIONS
Change warfarin 4 mg tablets: Take 2 tablets today, then 1 tablet every Mon and Fri, and a half table the other 5 days. Repeat Protime/INR in 10 days. If wound care does not clean/debride the wound, then you need to see surgeon to have this done. Cellulitis: Care Instructions  Your Care Instructions    Cellulitis is a skin infection caused by bacteria, most often strep or staph. It often occurs after a break in the skin from a scrape, cut, bite, or puncture, or after a rash. Cellulitis may be treated without doing tests to find out what caused it. But your doctor may do tests, if needed, to look for a specific bacteria, like methicillin-resistant Staphylococcus aureus (MRSA). The doctor has checked you carefully, but problems can develop later. If you notice any problems or new symptoms, get medical treatment right away. Follow-up care is a key part of your treatment and safety. Be sure to make and go to all appointments, and call your doctor if you are having problems. It's also a good idea to know your test results and keep a list of the medicines you take. How can you care for yourself at home? · Take your antibiotics as directed. Do not stop taking them just because you feel better. You need to take the full course of antibiotics. · Prop up the infected area on pillows to reduce pain and swelling. Try to keep the area above the level of your heart as often as you can. · If your doctor told you how to care for your wound, follow your doctor's instructions. If you did not get instructions, follow this general advice:  ? Wash the wound with clean water 2 times a day. Don't use hydrogen peroxide or alcohol, which can slow healing. ? You may cover the wound with a thin layer of petroleum jelly, such as Vaseline, and a nonstick bandage. ? Apply more petroleum jelly and replace the bandage as needed. · Be safe with medicines. Take pain medicines exactly as directed.   ? If the doctor gave you a prescription medicine for pain, take it as prescribed. ? If you are not taking a prescription pain medicine, ask your doctor if you can take an over-the-counter medicine. To prevent cellulitis in the future  · Try to prevent cuts, scrapes, or other injuries to your skin. Cellulitis most often occurs where there is a break in the skin. · If you get a scrape, cut, mild burn, or bite, wash the wound with clean water as soon as you can to help avoid infection. Don't use hydrogen peroxide or alcohol, which can slow healing. · If you have swelling in your legs (edema), support stockings and good skin care may help prevent leg sores and cellulitis. · Take care of your feet, especially if you have diabetes or other conditions that increase the risk of infection. Wear shoes and socks. Do not go barefoot. If you have athlete's foot or other skin problems on your feet, talk to your doctor about how to treat them. When should you call for help? Call your doctor now or seek immediate medical care if:    · You have signs that your infection is getting worse, such as:  ? Increased pain, swelling, warmth, or redness. ? Red streaks leading from the area. ? Pus draining from the area. ? A fever.     · You get a rash.    Watch closely for changes in your health, and be sure to contact your doctor if:    · You do not get better as expected. Where can you learn more? Go to http://dona-windy.info/. Yasmani Carty in the search box to learn more about \"Cellulitis: Care Instructions. \"  Current as of: April 17, 2018  Content Version: 11.9  © 3784-2942 [employer]. Care instructions adapted under license by [employer] (which disclaims liability or warranty for this information). If you have questions about a medical condition or this instruction, always ask your healthcare professional. [address] any warranty or liability for your use of this information.

## 2019-06-06 NOTE — PROGRESS NOTES
Ianmaryjo Lakisha  Identified pt with two pt identifiers(name and ). Chief Complaint   Patient presents with    Skin Infection    Anticoagulation       Reviewed record In preparation for visit and have obtained necessary documentation. Has info on advanced directive but has not filled them out. 1. Have you been to the ER, urgent care clinic or hospitalized since your last visit? No     2. Have you seen or consulted any other health care providers outside of the [address] [place] [address] since your last visit? Include any pap smears or colon screening. No    Vitals reviewed with provider.     Health Maintenance reviewed:     Health Maintenance Due   Topic    Shingrix Vaccine Age 49> (1 of 2)    Pneumococcal 0-64 years (2 of 3 - PCV13)    EYE EXAM RETINAL OR DILATED           Wt Readings from Last 3 Encounters:   19 262 lb (118.8 kg)   19 260 lb 8 oz (118.2 kg)   19 261 lb 12.8 oz (118.8 kg)        Temp Readings from Last 3 Encounters:   19 98.6 °F (37 °C) (Oral)   19 97.7 °F (36.5 °C)   19 98.2 °F (36.8 °C) (Oral)        BP Readings from Last 3 Encounters:   19 149/72   19 156/77   19 144/67        Pulse Readings from Last 3 Encounters:   19 84   19 73   19 66        Vitals:    19 1300 19 1305   BP: 159/77 149/72   Pulse: 84    Resp: 12    Temp: 98.6 °F (37 °C)    TempSrc: Oral    SpO2: 96%    Weight: 262 lb (118.8 kg)    Height: 5' 9\" (1.753 m)    PainSc:   0 - No pain           Learning Assessment:   :       Learning Assessment 3/25/2014   PRIMARY LEARNER Patient   HIGHEST LEVEL OF EDUCATION - PRIMARY LEARNER  2 YEARS OF COLLEGE   BARRIERS PRIMARY LEARNER NONE   CO-LEARNER CAREGIVER No   PRIMARY LANGUAGE ENGLISH    NEED No   LEARNER PREFERENCE PRIMARY DEMONSTRATION   LEARNING SPECIAL TOPICS Does does a pacemaker any noise   ANSWERED BY patient   RELATIONSHIP SELF        Depression Screening:   :       3 most recent PHQ Screens 3/11/2019   Little interest or pleasure in doing things Not at all   Feeling down, depressed, irritable, or hopeless Not at all   Total Score PHQ 2 0        Fall Risk Assessment:   :     No flowsheet data found. Abuse Screening:   :     No flowsheet data found.      ADL Screening:   :       ADL Assessment 11/17/2014   Feeding yourself No Help Needed   Getting from bed to chair No Help Needed   Getting dressed No Help Needed   Bathing or showering No Help Needed   Walk across the room (includes cane/walker) No Help Needed   Using the telphone No Help Needed   Taking your medications No Help Needed   Preparing meals No Help Needed   Managing money (expenses/bills) No Help Needed   Moderately strenuous housework (laundry) No Help Needed   Shopping for personal items (toiletries/medicines) No Help Needed   Shopping for groceries No Help Needed   Driving No Help Needed   Climbing a flight of stairs No Help Needed   Getting to places beyond walking distances No Help Needed

## 2019-06-07 ENCOUNTER — HOSPITAL ENCOUNTER (OUTPATIENT)
Dept: WOUND CARE | Age: 59
Discharge: HOME | End: 2019-06-07
Payer: COMMERCIAL

## 2019-06-07 VITALS
HEART RATE: 88 BPM | SYSTOLIC BLOOD PRESSURE: 178 MMHG | RESPIRATION RATE: 16 BRPM | DIASTOLIC BLOOD PRESSURE: 97 MMHG | TEMPERATURE: 97.7 F

## 2019-06-07 DIAGNOSIS — S81.811D LACERATION OF RIGHT LEG EXCLUDING THIGH, SUBSEQUENT ENCOUNTER: ICD-10-CM

## 2019-06-07 PROBLEM — S21.002A OPEN BREAST WOUND, LEFT, INITIAL ENCOUNTER: Status: ACTIVE | Noted: 2019-06-07

## 2019-06-07 PROCEDURE — 17250 CHEM CAUT OF GRANLTJ TISSUE: CPT

## 2019-06-07 PROCEDURE — 74011000250 HC RX REV CODE- 250: Performed by: OTOLARYNGOLOGY

## 2019-06-07 PROCEDURE — 29580 STRAPPING UNNA BOOT: CPT

## 2019-06-07 RX ADMIN — Medication: at 08:32
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
## 2019-06-07 NOTE — WOUND CARE
06/07/19 0919 Wound Breast Left Date First Assessed/Time First Assessed: 06/07/19 0834   Location: Breast  Wound Location Orientation: Left Dressing Type Applied Honey;ABD pad 
(medihoney alignate) Wound Leg Lower Right;Lateral  
Date First Assessed/Time First Assessed: 11/14/18 0830   POA: Yes  Wound Type: Pressure injury  Location: Leg Lower  Orientation: Right;Lateral  
Dressing Type Applied Xeroform; Unna boot Patient was discharged with Self to home and was ambulatory. In stable condition with c/o pain:0__/10_

## 2019-06-07 NOTE — WOUND CARE
06/07/19 0830 Wound Breast Left Date First Assessed/Time First Assessed: 06/07/19 0834   Location: Breast  Wound Location Orientation: Left Dressing Status Removed Dressing Type Transparent film;Xeroform Wound Length (cm) 4.6 cm Wound Width (cm) 6 cm Wound Depth (cm) 0.1 cm Wound Volume (cm^3) 2.76 cm^3 Condition of Base Eschar  
Condition of Edges Open Drainage Amount Small Drainage Color Serous Wound Odor None Cleansing and Cleansing Agents  Normal saline Wound Leg Lower Right;Lateral  
Date First Assessed/Time First Assessed: 11/14/18 0830   POA: Yes  Wound Type: Pressure injury  Location: Leg Lower  Orientation: Right;Lateral  
Dressing Status  Removed Dressing Type  Unna boot (hfbr) Wound Length (cm) 0.9 cm Wound Width (cm) 0.4 cm Wound Depth (cm) 0.1 Wound Surface area (cm^2) 0.36 cm^2 Change in Wound Size % 99.38 Condition of Base Pink Condition of Edges Open Tissue Type Pink;Yellow Drainage Amount  Small Drainage Color Serosanguinous Wound Odor None Periwound Skin Condition Intact

## 2019-06-13 DIAGNOSIS — I10 ESSENTIAL HYPERTENSION: ICD-10-CM

## 2019-06-14 ENCOUNTER — HOSPITAL ENCOUNTER (OUTPATIENT)
Dept: WOUND CARE | Age: 59
Discharge: HOME | End: 2019-06-14
Payer: COMMERCIAL

## 2019-06-14 VITALS
RESPIRATION RATE: 16 BRPM | SYSTOLIC BLOOD PRESSURE: 172 MMHG | TEMPERATURE: 97.9 F | HEART RATE: 60 BPM | DIASTOLIC BLOOD PRESSURE: 78 MMHG

## 2019-06-14 DIAGNOSIS — I48.0 PAF (PAROXYSMAL ATRIAL FIBRILLATION) (HCC): ICD-10-CM

## 2019-06-14 PROCEDURE — 11042 DBRDMT SUBQ TIS 1ST 20SQCM/<: CPT

## 2019-06-14 PROCEDURE — 74011000250 HC RX REV CODE- 250: Performed by: OTOLARYNGOLOGY

## 2019-06-14 RX ORDER — DOXAZOSIN 4 MG/1
TABLET ORAL
Qty: 30 TAB | Refills: 10 | Status: SHIPPED | OUTPATIENT
Start: 2019-06-14 | End: 2020-01-01

## 2019-06-14 RX ADMIN — Medication: at 08:59

## 2019-06-14 NOTE — PROGRESS NOTES
[facility] 
WOUND CARE PROGRESS NOTE Name:  Ibis Dillard 
MR#:  [id number] :  1960 ACCOUNT #:  [de-identified] DATE OF SERVICE:  2019 SUBJECTIVE:  The patient returns for treatment of two wounds. She has been treated for a prolonged period time for a chronic wound to the right lower extremity which is a laceration without foreign body, S81.811D, which is a venous leg ulcer that only involves skin breakdown I87.311, L97.911 in a type 2 diabetic, E11.622. Last week she had a new wound to the left breast S21.002D. She had a good week. She denies any pain, fevers or chills. She has had no changes in medications, allergies or review of systems since last seen. OBJECTIVE: 
VITAL SIGNS:  Her temperature today is 97.9, pulse 60, respirations 16, blood pressure 172/78. WOUND EXAMINATION:  The original wound of the right lateral lower extremity is 0.7 x 0.5 x 0.1 cm. It is moist, it is flat, it is clean and it is recommended that we simply cauterize this wound with silver nitrate to try to stimulate healing. I explained the risks and benefits. The patient understood and wished to proceed. Therefore, topical Xylocaine 4% gel was applied for 10 minutes. This was wiped off and then a single stick of silver nitrate was used and then irrigated off. There were no changes in the wound dimensions. The new wound of the left breast is 5 x 7 cm. It is a hard eschar. This area is without sensation. I recommended that this be excised. I explained the risks and benefits. The patient understood and wished to proceed. I then proceeded by grasping the loose medial end and elevating it with forceps. The undersurface was then excised from the deep fat tissue of the flap with a 15 blade and this was removed in its entirety. The wound dimensions were unchanged at 5 x 7, but the depth was 0.3 cm.   Two bleeding sites were cauterized with silver nitrate to create involution of the blood vessels. The wound was irrigated with saline. ASSESSMENT AND PLAN:  For the right lower extremity wound, we are going to treat this with Endoform followed by an Unna boot. For the left breast, we are going to use Adaptic and Intrasite gel followed by an ABD pad. We are trying to avoid taping this area and she will try to use a bra to hold the dressings in place. It is okay to shower and wash this area of the left breast with baby shampoo. I will see her again in followup in one week. She understands that my biggest concern at this point is to prevent the wound from drying out and that is why we are doing this regimen. We might find that our needs will change when we see her next week. All questions were answered. Her condition on discharge is stable. Cosmo Silva MD 
 
 
[id number] 
D:  06/14/2019 10:34 
T:  06/14/2019 10:44 JOB #:  [id number]

## 2019-06-14 NOTE — WOUND CARE
06/14/19 9699 Wound Breast Left Date First Assessed/Time First Assessed: 06/07/19 0834   Location: Breast  Wound Location Orientation: Left Dressing Status Removed Dressing Type Honey; Alginate;ABD pad;Special tape (comment) Wound Length (cm) 5 cm Wound Width (cm) 7 cm Wound Depth (cm) 0.1 cm Wound Volume (cm^3) 3.5 cm^3 Condition of Base Eschar  
Condition of Edges Open Drainage Amount Small Drainage Color Serous Wound Odor None Cleansing and Cleansing Agents  Normal saline Wound Leg Lower Right;Lateral  
Date First Assessed/Time First Assessed: 11/14/18 0830   POA: Yes  Wound Type: Pressure injury  Location: Leg Lower  Orientation: Right;Lateral  
Dressing Status  Removed Dressing Type  Xeroform; Unna boot Wound Length (cm) 0.7 cm Wound Width (cm) 0.5 cm Wound Depth (cm) 0.1 Wound Surface area (cm^2) 0.35 cm^2 Change in Wound Size % 99.39 Condition of Base Pink;Slough Condition of Edges Open Drainage Amount  Small Drainage Color Serosanguinous Wound Odor None Periwound Skin Condition Intact Cleansing and Cleansing Agents  Normal saline Visit Vitals /78 Pulse 60 Temp 97.9 °F (36.6 °C) Resp 16 RLE Peripheral Vascular Capillary Refill: Less than/equal to 3 seconds (06/14/19 0902) Color: Appropriate for race (06/14/19 0902) Temperature: Warm (06/14/19 0902) Sensation: Present (06/14/19 0902) Pedal Pulse: Palpable (06/14/19 0902) Circumference of Calf (cm): 40.5 cm (06/14/19 0902) Location of Measurement (Calf): Mid  (06/14/19 0902) Circumference of Ankle (cm): 22 cm (06/14/19 0902) Location of Measurement (Ankle): Upper  (06/14/19 0902)

## 2019-06-14 NOTE — WOUND CARE
06/14/19 8065 Wound Breast Left Date First Assessed/Time First Assessed: 06/07/19 0834   Location: Breast  Wound Location Orientation: Left Dressing Type Applied Non-adherent;ABD pad 
(adaptic,intrasite) Wound Procedure Type Debridement- Surgical  
Procedure Time Out 5582 Consent Obtained  Yes Procedure Bleeding Moderate Procedure Hemostasis  Pressure Procedure Instrument  Blade; Forceps Procedure Pain Scale Numeric 0/10 Debridement Procedure Performed by DR Sanjuana Holman Post Procedure Pain Scale Numeric 0/10 Procedure Tolerated Well Wound Leg Lower Right;Lateral  
Date First Assessed/Time First Assessed: 11/14/18 0830   POA: Yes  Wound Type: Pressure injury  Location: Leg Lower  Orientation: Right;Lateral  
Dressing Type Applied Collagens/cell matrix;Gauze; Unna boot 
(Endoform) Wound Procedure Type Other Procedure Time Out 5768 Consent Obtained  Yes Procedure Bleeding None Procedure Hemostasis  Silver Nitrate Procedure Pain Scale Numeric 0/10 Debridement Procedure Performed by DR Sanjuana Holman Post Procedure Pain Scale Numeric 0/10 Procedure Tolerated Well Dressing done by Pink Flatter 
 
Patient was discharged with Self to home and was ambulatory. In stable condition with c/o pain:_0_/10_

## 2019-06-19 DIAGNOSIS — I48.0 PAF (PAROXYSMAL ATRIAL FIBRILLATION) (HCC): Primary | ICD-10-CM

## 2019-06-19 LAB
INR PPP: 2.9 (ref 0.8–1.2)
PROTHROMBIN TIME: 28.7 SEC (ref 9.1–12)

## 2019-06-19 NOTE — PROGRESS NOTES
Inform patient INR is therapeutic. Confirm current dose of warfarin 4 mg tablets a half tablet daily . No change in warfarin dose. Repeat in 2 weeks to assure stability. Message also sent in My Chart.

## 2019-06-21 ENCOUNTER — HOSPITAL ENCOUNTER (OUTPATIENT)
Dept: WOUND CARE | Age: 59
Discharge: HOME | End: 2019-06-21
Payer: COMMERCIAL

## 2019-06-21 VITALS
RESPIRATION RATE: 16 BRPM | DIASTOLIC BLOOD PRESSURE: 66 MMHG | HEART RATE: 60 BPM | TEMPERATURE: 97.7 F | SYSTOLIC BLOOD PRESSURE: 103 MMHG

## 2019-06-21 PROBLEM — L97.422 DIABETIC ULCER OF LEFT HEEL ASSOCIATED WITH TYPE 2 DIABETES MELLITUS, WITH FAT LAYER EXPOSED (HCC): Status: ACTIVE | Noted: 2019-06-21

## 2019-06-21 PROBLEM — E11.621 DIABETIC ULCER OF LEFT HEEL ASSOCIATED WITH TYPE 2 DIABETES MELLITUS, WITH FAT LAYER EXPOSED (HCC): Status: ACTIVE | Noted: 2019-06-21

## 2019-06-21 PROCEDURE — 74011000250 HC RX REV CODE- 250: Performed by: OTOLARYNGOLOGY

## 2019-06-21 PROCEDURE — 11045 DBRDMT SUBQ TISS EACH ADDL: CPT

## 2019-06-21 PROCEDURE — 11042 DBRDMT SUBQ TIS 1ST 20SQCM/<: CPT

## 2019-06-21 RX ADMIN — Medication: at 09:01
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
## 2019-06-21 NOTE — WOUND CARE
06/21/19 1018 Wound Breast Left Date First Assessed/Time First Assessed: 06/07/19 0834   Location: Breast  Wound Location Orientation: Left Dressing Type Applied  
(snap vac, -125mmhg) Post Procedure Pain Scale Numeric 0/10 Wound Leg Lower Right;Lateral  
Date First Assessed/Time First Assessed: 11/14/18 0830   POA: Yes  Wound Type: Pressure injury  Location: Leg Lower  Orientation: Right;Lateral  
Dressing Type Applied  
(santyl, NS moist gauze, dry gauze, RG) Discharged from wound center, Denies pain. . She has apt to follow up with wound center in 1 week.

## 2019-06-21 NOTE — WOUND CARE
06/21/19 7335 Wound Foot Left;Plantar Date First Assessed/Time First Assessed: 06/21/19 0857   Present on Hospital Admission: Yes  Primary Wound Type: Diabetic  Location: Foot  Wound Location Orientation: Left;Plantar Dressing Status Removed Dressing Type Adhesive wound dressing (Band-Aid) Wound Length (cm) 1.5 cm Wound Width (cm) 2 cm Wound Depth (cm) 0.2 cm Wound Volume (cm^3) 0.6 cm^3 Condition of Base Pink Condition of Edges Open;Calloused Drainage Amount Moderate Drainage Color Serosanguinous Wound Odor None Sandra-wound Assessment Maceration Cleansing and Cleansing Agents  Normal saline Wound Breast Left Date First Assessed/Time First Assessed: 06/07/19 0834   Location: Breast  Wound Location Orientation: Left Dressing Status Removed Dressing Type Honey;ABD pad Wound Length (cm) 5.7 cm Wound Width (cm) 7.7 cm Wound Depth (cm) 0.5 cm Wound Volume (cm^3) 21.94 cm^3 Condition of Base Pink;Slough Condition of Edges Open Drainage Amount Small Drainage Color Serous Wound Odor None Cleansing and Cleansing Agents  Normal saline Wound Leg Lower Right;Lateral  
Date First Assessed/Time First Assessed: 11/14/18 0830   POA: Yes  Wound Type: Pressure injury  Location: Leg Lower  Orientation: Right;Lateral  
Dressing Status  Removed Dressing Type  Unna boot Wound Length (cm) 0.4 cm Wound Width (cm) 0.3 cm Wound Depth (cm) 0.1 Wound Surface area (cm^2) 0.12 cm^2 Change in Wound Size % 99.79 Drainage Amount  Scant Drainage Color Serosanguinous Wound Odor None Periwound Skin Condition Intact Cleansing and Cleansing Agents  Normal saline; Soap and water Visit Vitals /66 Pulse 60 Temp 97.7 °F (36.5 °C) Resp 16 RLE Peripheral Vascular Capillary Refill: Less than/equal to 3 seconds (06/21/19 0853) Color: Appropriate for race (06/21/19 0853) Temperature: Warm (06/21/19 0853) Sensation: Present (06/21/19 5846) Pedal Pulse: Palpable (06/21/19 0853) Circumference of Calf (cm): 38.5 cm (06/21/19 0853) Location of Measurement (Calf): Mid  (06/21/19 0853) Circumference of Ankle (cm): 21.5 cm (06/21/19 0853) Location of Measurement (Ankle): Upper  (06/21/19 0853)

## 2019-06-24 ENCOUNTER — HOSPITAL ENCOUNTER (OUTPATIENT)
Dept: WOUND CARE | Age: 59
Discharge: HOME | End: 2019-06-24
Payer: COMMERCIAL

## 2019-06-24 VITALS
TEMPERATURE: 97.8 F | RESPIRATION RATE: 16 BRPM | HEART RATE: 80 BPM | SYSTOLIC BLOOD PRESSURE: 178 MMHG | DIASTOLIC BLOOD PRESSURE: 83 MMHG

## 2019-06-24 PROCEDURE — 97607 NEG PRS WND THR NDME<=50SQCM: CPT

## 2019-06-24 RX ORDER — HYDRALAZINE HYDROCHLORIDE 100 MG/1
TABLET, FILM COATED ORAL
Qty: 90 TAB | Refills: 1 | Status: SHIPPED | OUTPATIENT
Start: 2019-06-24 | End: 2019-10-21 | Stop reason: SDUPTHER

## 2019-06-24 NOTE — WOUND CARE
06/24/19 1617 Wound Breast Left Date First Assessed/Time First Assessed: 06/07/19 0834   Location: Breast  Wound Location Orientation: Left Dressing Status Removed Dressing Type  
(smap vac, -125 mmhg) Condition of Base Pink;Slough Condition of Edges Open Drainage Amount Small Drainage Color Serous Wound Odor None Cleansing and Cleansing Agents  Normal saline Post Procedure Pain Scale Numeric 0/10 Wound Foot Left;Plantar Date First Assessed/Time First Assessed: 06/21/19 0857   Present on Hospital Admission: Yes  Primary Wound Type: Diabetic  Location: Foot  Wound Location Orientation: Left;Plantar Dressing Status Removed Dressing Type (Snap vac ) Condition of Base Pink;Slough Condition of Edges Open Drainage Amount Moderate Drainage Color Serosanguinous Wound Odor None Sandra-wound Assessment Intact Cleansing and Cleansing Agents  Normal saline Dressing Type Applied (Snap vac) Procedure Tolerated Well Visit Vitals /83 Pulse 80 Temp 97.8 °F (36.6 °C) Resp 16 Patient discharged to home ambulatory, denied pain at time of discharge. She will return for MD follow-up on Friday 06/28/19.

## 2019-06-25 ENCOUNTER — HOSPITAL ENCOUNTER (OUTPATIENT)
Dept: GENERAL RADIOLOGY | Age: 59
Discharge: HOME | End: 2019-06-25
Payer: COMMERCIAL

## 2019-06-25 DIAGNOSIS — E11.621 DIABETIC FOOT ULCERS (HCC): ICD-10-CM

## 2019-06-25 DIAGNOSIS — L97.509 DIABETIC FOOT ULCERS (HCC): ICD-10-CM

## 2019-06-25 PROCEDURE — 73630 X-RAY EXAM OF FOOT: CPT

## 2019-06-28 ENCOUNTER — HOSPITAL ENCOUNTER (OUTPATIENT)
Dept: WOUND CARE | Age: 59
Discharge: HOME | End: 2019-06-28
Payer: COMMERCIAL

## 2019-06-28 VITALS
RESPIRATION RATE: 16 BRPM | HEART RATE: 89 BPM | DIASTOLIC BLOOD PRESSURE: 69 MMHG | SYSTOLIC BLOOD PRESSURE: 126 MMHG | TEMPERATURE: 97.2 F

## 2019-06-28 PROCEDURE — 74011000250 HC RX REV CODE- 250: Performed by: OTOLARYNGOLOGY

## 2019-06-28 PROCEDURE — 11043 DBRDMT MUSC&/FSCA 1ST 20/<: CPT

## 2019-06-28 PROCEDURE — 11046 DBRDMT MUSC&/FSCA EA ADDL: CPT

## 2019-06-28 RX ADMIN — Medication: at 08:42

## 2019-06-28 NOTE — WOUND CARE
06/28/19 1807 Wound Breast Left Date First Assessed/Time First Assessed: 06/07/19 0834   Location: Breast  Wound Location Orientation: Left Dressing Type Applied Vacuum dressing 
(snap vac @125 mm HG) Wound Foot Left;Plantar Date First Assessed/Time First Assessed: 06/21/19 0857   Present on Hospital Admission: Yes  Primary Wound Type: Diabetic  Location: Foot  Wound Location Orientation: Left;Plantar Dressing Type Applied Collagens/cell matrix;Foam;Moist to dry;Special tape (comment) 
(snatyl,saline moist gauze,windowpane foam and tape) Dressing done by Renetta Rees  
Patient was discharged with Self to home and was ambulatory. In stable condition with c/o pain:_0_/10_

## 2019-06-28 NOTE — WOUND CARE
06/28/19 0396 Wound Breast Left Date First Assessed/Time First Assessed: 06/07/19 0834   Location: Breast  Wound Location Orientation: Left Dressing Status Removed Dressing Type Vacuum dressing Wound Length (cm) 4.8 cm Wound Width (cm) 6.2 cm Wound Depth (cm) 0.1 cm Wound Volume (cm^3) 2.98 cm^3 Condition of Base Slough;Granulation Condition of Edges Open Drainage Amount Large Drainage Color Serosanguinous Wound Odor None Cleansing and Cleansing Agents  Normal saline Wound Foot Left;Plantar Date First Assessed/Time First Assessed: 06/21/19 0857   Present on Hospital Admission: Yes  Primary Wound Type: Diabetic  Location: Foot  Wound Location Orientation: Left;Plantar Dressing Status Removed Dressing Type Gauze;Gauze wrap (arnie); Special tape (comment) 
(medihoney aliginate) Wound Length (cm) 1.5 cm Wound Width (cm) 1.8 cm Wound Depth (cm) 0.1 cm Wound Volume (cm^3) 0.27 cm^3 Condition of [facility] Condition of Edges Open Drainage Amount Small Drainage Color Serosanguinous Wound Odor None Sandra-wound Assessment Intact Cleansing and Cleansing Agents  Normal saline Wound Leg Lower Right;Lateral  
Date First Assessed/Time First Assessed: 11/14/18 0830   POA: Yes  Wound Type: Pressure injury  Location: Leg Lower  Orientation: Right;Lateral  
Wound Length (cm) 0 cm Wound Width (cm) 0 cm Wound Depth (cm) 0 Wound Surface area (cm^2) 0 cm^2 Change in Wound Size % 100 Visit Vitals /69 Pulse 89 Temp 97.2 °F (36.2 °C) Resp 16 LLE Peripheral Vascular Capillary Refill: Less than/equal to 3 seconds (06/28/19 0830) Color: Appropriate for race (06/28/19 0830) Temperature: Warm (06/28/19 0830) Sensation: Present (06/28/19 0830) Pedal Pulse: Palpable (06/28/19 0830)

## 2019-06-28 NOTE — PROGRESS NOTES
[address] 
WOUND CARE PROGRESS NOTE Name:  Myrna Burt 
MR#:  [id number] :  1960 ACCOUNT #:  [de-identified] DATE OF SERVICE:  2019 SUBJECTIVE:  The patient returns for two wounds, one is the wound of left breast S21.002D and she has a new left heel diabetic foot ulcer E11.621. Over the week, we got x-rays of the left heel. There was no evidence of osteomyelitis and no evidence of a foreign body. The patient had normal ABIs and good TBIs of 0.88 in 2018, but this is also the foot that had deep dermal injury when she walked on hot sand in the distant past. 
 
The patient ordered Santyl, but it has not yet arrived and therefore this week she has been using Medihoney pads. She is using a compression stocking on the right leg. She has been using a SNAP VAC; on the left breast, the VAC became filled with secretions and she returned for a vacuum change on Monday, 4 days ago. Last night it became full again, so it lasted about three and a half days. She is taking clindamycin and is tolerating it. She ordered a Darco shoe, but that is not due to arrive yet and therefore she is still using conventional shoes. OBJECTIVE: 
VITAL SIGNS:  Her temperature today is 97.2, pulse 89, respirations 16, blood pressure 126/69. WOUND EXAMINATION:  The wound to the left plantar foot today measures 1.5 x 1.8 x 0.1 cm. It is covered with soft eschar, which has softened as a consequence of the Hydrofera blue. It is recommended this wound be debrided. I explained the risks and benefits. The patient understood and wished to proceed. PROCEDURE:  Therefore, using 4% Xylocaine gel, topical anesthetic was achieved after 10 minutes. Using forceps and a 15-blade, the soft eschar was removed.   The surface of the wound was lightly debrided with a sharp edge of the 15-blade and a very tiny piece of gravel or linda litter came out, which is almost certainly the source of her wound. The surface of the wound was then crosshatched to create more bleeding and the wound was then scrubbed with Hibiclens followed by normal saline. On the left breast, 30% of the wound still has slough. The remainder is nice healthy granulation tissue, which looks quite healthy. I recommended that this wound be debrided of all slough. I explained the risks and benefits. The patient understood and wished to proceed. Therefore, using new forceps and a new 15-blade all devitalized tissue was removed down to nice healthy bleeding granulation tissue. Both wounds were then scrubbed with Hibiclens followed by normal saline. ASSESSMENT AND PLAN:  For the left heel, we are going to apply Santyl today. In 24 hours, the patient will remove that and use Medihoney pads until her supply of Santyl arrives. For the left breast, we are going to reapply the [facility] VAC. The patient will see on Monday if the canister is full. If it is, then she will come in that day; if not, she will come in on Tuesday and be seen again on Friday, since the disruptive issue this coming week will be the clinic closure on Thursday, the 4th of July. As soon as the patient's Darco shoe arrives, she will wearing that, but she will take care so as not to fall and she will use a cane or walking stick with it. All questions were answered. Her condition on discharge is stable. MD SHEEBA [email or web address] 
D:  06/28/2019 9:15 
T:  06/28/2019 9:25 
JOB #:  [id number]

## 2019-07-02 ENCOUNTER — HOSPITAL ENCOUNTER (OUTPATIENT)
Dept: WOUND CARE | Age: 59
Discharge: HOME | End: 2019-07-02
Payer: COMMERCIAL

## 2019-07-02 VITALS
TEMPERATURE: 97.7 F | DIASTOLIC BLOOD PRESSURE: 75 MMHG | RESPIRATION RATE: 16 BRPM | SYSTOLIC BLOOD PRESSURE: 181 MMHG | HEART RATE: 67 BPM

## 2019-07-02 DIAGNOSIS — S81.811D LACERATION OF RIGHT LEG EXCLUDING THIGH, SUBSEQUENT ENCOUNTER: ICD-10-CM

## 2019-07-02 DIAGNOSIS — S21.002A OPEN BREAST WOUND, LEFT, INITIAL ENCOUNTER: ICD-10-CM

## 2019-07-02 DIAGNOSIS — E11.621 DIABETIC ULCER OF LEFT HEEL ASSOCIATED WITH TYPE 2 DIABETES MELLITUS, WITH FAT LAYER EXPOSED (HCC): ICD-10-CM

## 2019-07-02 DIAGNOSIS — L97.422 DIABETIC ULCER OF LEFT HEEL ASSOCIATED WITH TYPE 2 DIABETES MELLITUS, WITH FAT LAYER EXPOSED (HCC): ICD-10-CM

## 2019-07-02 PROCEDURE — 97607 NEG PRS WND THR NDME<=50SQCM: CPT

## 2019-07-02 NOTE — WOUND CARE
Patient in for NV. 
 
 07/02/19 1204 Wound Breast Left Date First Assessed/Time First Assessed: 06/07/19 0834   Location: Breast  Wound Location Orientation: Left Dressing Type Applied Vacuum dressing 
(snap vac @125 mm Hg) Visit Vitals /75 (BP Patient Position: Sitting) Pulse 67 Temp 97.7 °F (36.5 °C) Resp 16 Patient was discharged with Self to home and was ambulatory. In stable condition with c/o pain:0__/10_

## 2019-07-05 ENCOUNTER — HOSPITAL ENCOUNTER (OUTPATIENT)
Dept: WOUND CARE | Age: 59
Discharge: HOME | End: 2019-07-05
Payer: COMMERCIAL

## 2019-07-05 VITALS
HEART RATE: 90 BPM | DIASTOLIC BLOOD PRESSURE: 83 MMHG | RESPIRATION RATE: 16 BRPM | TEMPERATURE: 97.8 F | SYSTOLIC BLOOD PRESSURE: 171 MMHG

## 2019-07-05 PROCEDURE — 74011000250 HC RX REV CODE- 250: Performed by: OTOLARYNGOLOGY

## 2019-07-05 PROCEDURE — 11042 DBRDMT SUBQ TIS 1ST 20SQCM/<: CPT

## 2019-07-05 RX ADMIN — Medication: at 08:39

## 2019-07-05 NOTE — WOUND CARE
07/05/19 0731 Wound Foot Left;Plantar Date First Assessed/Time First Assessed: 06/21/19 0857   Present on Hospital Admission: Yes  Primary Wound Type: Diabetic  Location: Foot  Wound Location Orientation: Left;Plantar Wound Procedure Type Debridement- Surgical  
Procedure Time Out 9580 Consent Obtained  Yes Procedure Bleeding Minimal  
Procedure Hemostasis  Silver Nitrate Procedure Instrument  Blade; Forceps;Curette Post-Procedure Length (cm) 1.7 cm Post-Procedure Width (cm) 2 cm Post-Procedure Depth (cm) 0.2 cm Post-Procedure Volume (cm^3) 0.68 cm^3 Post-Procedure Surface Area (cm^2) 3.4 cm^2 Post Procedure Pain Scale Numeric 0/10 Procedure Tolerated Well Wound Breast Left Date First Assessed/Time First Assessed: 06/07/19 0834   Location: Breast  Wound Location Orientation: Left Wound Procedure Type Selective Debridement Procedure Time Out 1853 Consent Obtained  Yes Procedure Bleeding Minimal  
Procedure Hemostasis  Pressure Procedure Instrument  Scissors; Forceps;Curette Procedure Pain Scale Numeric 0/10 Debridement Procedure Performed by DR Artur Albright Post-Procedure Length (cm) 3.9 cm Post-Procedure Width (cm) 5.5 cm Post-Procedure Depth (cm) 0.1 cm Post-Procedure Volume (cm^3) 2.14 cm^3 Post-Procedure Surface Area (cm^2) 21.45 cm^2 Post Procedure Pain Scale Numeric 0/10 Procedure Tolerated Well

## 2019-07-05 NOTE — PROGRESS NOTES
[address] 
WOUND CARE PROGRESS NOTE Name:  Dawna Byers 
MR#:  [id number] :  1960 ACCOUNT #:  [de-identified] DATE OF SERVICE:  2019 SUBJECTIVE:  The patient returns for treatment of two wounds: left breast wound S21.002D, and a left heel diabetic foot ulcer E11.621. The patient continues to have complaints only referable to her left heel with pain on the lateral surface of the foot. She did come in once during the week on 2019 to have the wound VAC changed because of drainage from the canister. Other than that, she has had no changes in medications, allergies or review of systems. OBJECTIVE: 
VITAL SIGNS:  Her temperature is 97.8, pulse 90, respirations 16, blood pressure 171/83. WOUND EXAMINATION:  The left heel wound measures larger 1.7 x 2 x 0.1 cm. The wound is covered with slough. It is recommended that the wound be debrided of all devitalized tissue. I explained the risks and benefits. The patient understood and wished to proceed. Therefore topical Xylocaine was applied for 10 minutes. Using forceps, a 15-blade and a 5-mm ring curette, all devitalized tissue was removed down to healthy muscle. There is one significant bleeder, which was addressed with cautery silver nitrate until dry. The wound was then scrubbed with Hibiclens followed by normal saline. The post debridement dimensions are 1.8 x 2.1 x 0.3 cm. The wound of the left breast measured smaller 3.9 x 5.5 x 0.1 cm. Most of the wound is nice healthy granulation tissue. There are small islands of slough and it is recommended they be debrided. I explained the risks and benefits. The patient understood and wished to proceed. Therefore using forceps and iris scissors, all devitalized tissue was removed. The surface of the wound was then completely debrided with a 5-mm ring curette to remove all slough. The wound was scrubbed with Hibiclens followed by normal saline. ASSESSMENT AND PLAN:  For the left breast wound, we are going to add Evangelina Ag, followed by the use of foam and the SNAP VAC and this will be changed 3 days prior to the next week's exam. 
 
For the left heel wound, the patient still has not received her Darco heel-offloading boot. She has tried to walk on her tiptoe, but is obviously still getting pressure over her heel. I recommended that we apply a total contact cast today. She was very reluctant since she has had them in the past and she had to assume the entire cost which is over 800 dollars. She believes the Darco boot will arrive at her home today, but in fact this has really been 2 weeks since it was ordered and she understands that I am very concerned that she continues to get pressure over this area. Trying to work with her, we will continue using the Santyl for the left heel to be changed daily. I need her to offload this area as much as she possibly can. Therefore hopefully she will get the Darco shoe today. If it has not arrived by the time she comes back for her SNAP VAC change, then we will probably proceed with the total contact cast at that time and deal with insurance to the best of our ability, or have her get a knee roller. She understands the plan. She understands the necessity of offloading this area and I will see her again in 1 week. MD SHEEBA [email or web address] 
D:  07/05/2019 9:33 
T:  07/05/2019 9:42 JOB #:  [id number]

## 2019-07-05 NOTE — WOUND CARE
07/05/19 5948 Wound Foot Left;Plantar Date First Assessed/Time First Assessed: 06/21/19 0857   Present on Hospital Admission: Yes  Primary Wound Type: Diabetic  Location: Foot  Wound Location Orientation: Left;Plantar Dressing Status Removed Dressing Type Gauze;Gauze wrap (arnie); Special tape (comment) Wound Length (cm) 1.7 cm Wound Width (cm) 2 cm Wound Depth (cm) 0.1 cm Wound Volume (cm^3) 0.34 cm^3 [facility] Condition of Edges Open Drainage Amount Small Drainage Color Serosanguinous Wound Odor None Sandra-wound Assessment Intact Cleansing and Cleansing Agents  Normal saline Wound Breast Left Date First Assessed/Time First Assessed: 06/07/19 0834   Location: Breast  Wound Location Orientation: Left Dressing Status Removed Dressing Type Vacuum dressing Wound Length (cm) 3.9 cm Wound Width (cm) 5.5 cm Wound Depth (cm) 0.1 cm Wound Volume (cm^3) 2.14 cm^3 Condition of Base Granulation;Slough Condition of Edges Open Drainage Amount Large 
(40cc) Drainage Color Serosanguinous Wound Odor None Cleansing and Cleansing Agents  Normal saline Visit Vitals /83 Pulse 90 Temp 97.8 °F (36.6 °C) Resp 16 LLE Peripheral Vascular Capillary Refill: Less than/equal to 3 seconds (07/05/19 0826) Color: Appropriate for race (07/05/19 5980) Temperature: Warm (07/05/19 0826) Sensation: Present (07/05/19 8989) Pedal Pulse: Palpable (07/05/19 0826)

## 2019-07-05 NOTE — WOUND CARE
07/05/19 0920 Wound Foot Left;Plantar Date First Assessed/Time First Assessed: 06/21/19 0857   Present on Hospital Admission: Yes  Primary Wound Type: Diabetic  Location: Foot  Wound Location Orientation: Left;Plantar Dressing Status Removed Dressing Type Applied  
(santyl, gauze RG) Wound Breast Left Date First Assessed/Time First Assessed: 06/07/19 0834   Location: Breast  Wound Location Orientation: Left Dressing Changed  
(parminder, snap vac) Discharged from wound center, ambulatory, Has follow up for nurse visit on 7/9/19. Pain 03/10

## 2019-07-08 ENCOUNTER — HOSPITAL ENCOUNTER (OUTPATIENT)
Dept: WOUND CARE | Age: 59
Discharge: HOME | End: 2019-07-08
Payer: COMMERCIAL

## 2019-07-08 VITALS
DIASTOLIC BLOOD PRESSURE: 77 MMHG | RESPIRATION RATE: 16 BRPM | HEART RATE: 90 BPM | SYSTOLIC BLOOD PRESSURE: 179 MMHG | TEMPERATURE: 98 F

## 2019-07-08 PROCEDURE — 97607 NEG PRS WND THR NDME<=50SQCM: CPT

## 2019-07-08 NOTE — WOUND CARE
07/08/19 1715 Wound Foot Left;Plantar Date First Assessed/Time First Assessed: 06/21/19 0857   Present on Hospital Admission: Yes  Primary Wound Type: Diabetic  Location: Foot  Wound Location Orientation: Left;Plantar Dressing Status Removed Dressing Type (Snap vac) Condition of Base Granulation Condition of Edges Open Drainage Amount Moderate Drainage Color Serosanguinous Wound Odor None Sandra-wound Assessment Intact Cleansing and Cleansing Agents  Normal saline Dressing Type Applied (Snap vac) Visit Vitals /77 (BP Patient Position: Sitting) Pulse 90 Temp 98 °F (36.7 °C) Resp 16 Patient discharged to home ambulatory with cane. Denied pain at time of discharge. She will return to clinic on Friday 07/12/19 for MD follow-up.

## 2019-07-10 ENCOUNTER — OFFICE VISIT (OUTPATIENT)
Dept: INTERNAL MEDICINE CLINIC | Facility: CLINIC | Age: 59
End: 2019-07-10

## 2019-07-10 VITALS
TEMPERATURE: 98.1 F | WEIGHT: 260 LBS | HEART RATE: 73 BPM | SYSTOLIC BLOOD PRESSURE: 148 MMHG | DIASTOLIC BLOOD PRESSURE: 62 MMHG | RESPIRATION RATE: 12 BRPM | BODY MASS INDEX: 38.51 KG/M2 | HEIGHT: 69 IN | OXYGEN SATURATION: 96 %

## 2019-07-10 DIAGNOSIS — D63.1 ANEMIA IN STAGE 4 CHRONIC KIDNEY DISEASE (HCC): ICD-10-CM

## 2019-07-10 DIAGNOSIS — E66.01 MORBID OBESITY WITH BMI OF 40.0-44.9, ADULT (HCC): ICD-10-CM

## 2019-07-10 DIAGNOSIS — E11.621 DIABETIC ULCER OF LEFT HEEL ASSOCIATED WITH TYPE 2 DIABETES MELLITUS, WITH FAT LAYER EXPOSED (HCC): ICD-10-CM

## 2019-07-10 DIAGNOSIS — Z79.4 CONTROLLED TYPE 2 DIABETES MELLITUS WITH STAGE 4 CHRONIC KIDNEY DISEASE, WITH LONG-TERM CURRENT USE OF INSULIN (HCC): Primary | ICD-10-CM

## 2019-07-10 DIAGNOSIS — E78.2 MIXED HYPERLIPIDEMIA: ICD-10-CM

## 2019-07-10 DIAGNOSIS — I10 ESSENTIAL HYPERTENSION: ICD-10-CM

## 2019-07-10 DIAGNOSIS — I48.0 PAF (PAROXYSMAL ATRIAL FIBRILLATION) (HCC): ICD-10-CM

## 2019-07-10 DIAGNOSIS — N18.4 ANEMIA IN STAGE 4 CHRONIC KIDNEY DISEASE (HCC): ICD-10-CM

## 2019-07-10 DIAGNOSIS — N18.4 CONTROLLED TYPE 2 DIABETES MELLITUS WITH STAGE 4 CHRONIC KIDNEY DISEASE, WITH LONG-TERM CURRENT USE OF INSULIN (HCC): Primary | ICD-10-CM

## 2019-07-10 DIAGNOSIS — S21.002A OPEN BREAST WOUND, LEFT, INITIAL ENCOUNTER: ICD-10-CM

## 2019-07-10 DIAGNOSIS — I49.5 SICK SINUS SYNDROME (HCC): ICD-10-CM

## 2019-07-10 DIAGNOSIS — E11.42 DIABETIC POLYNEUROPATHY ASSOCIATED WITH TYPE 2 DIABETES MELLITUS (HCC): ICD-10-CM

## 2019-07-10 DIAGNOSIS — Z79.01 LONG TERM (CURRENT) USE OF ANTICOAGULANTS: ICD-10-CM

## 2019-07-10 DIAGNOSIS — E11.22 CONTROLLED TYPE 2 DIABETES MELLITUS WITH STAGE 4 CHRONIC KIDNEY DISEASE, WITH LONG-TERM CURRENT USE OF INSULIN (HCC): Primary | ICD-10-CM

## 2019-07-10 DIAGNOSIS — L97.422 DIABETIC ULCER OF LEFT HEEL ASSOCIATED WITH TYPE 2 DIABETES MELLITUS, WITH FAT LAYER EXPOSED (HCC): ICD-10-CM

## 2019-07-10 LAB
HBA1C MFR BLD HPLC: 7.2 %
INR BLD: 1.7
PT POC: 20.2 SECONDS
VALID INTERNAL CONTROL?: YES

## 2019-07-10 RX ORDER — WARFARIN 4 MG/1
TABLET ORAL
Qty: 45 TAB | Refills: 2
Start: 2019-07-10 | End: 2019-07-10

## 2019-07-10 RX ORDER — WARFARIN 4 MG/1
TABLET ORAL
Qty: 45 TAB | Refills: 2
Start: 2019-07-10 | End: 2019-07-31

## 2019-07-10 NOTE — PROGRESS NOTES
Howardpatrick Ibarra  Identified pt with two pt identifiers(name and ). Chief Complaint Patient presents with  Diabetes  Hypertension  Cholesterol Problem  Anticoagulation Reviewed record In preparation for visit and have obtained necessary documentation. Has info on advanced directive but has not filled them out. 1. Have you been to the ER, urgent care clinic or hospitalized since your last visit? No  
 
2. Have you seen or consulted any other health care providers outside of the [address] [place] [address] since your last visit? Include any pap smears or colon screening. Wound care Vitals reviewed with provider. Health Maintenance reviewed:  
 
Health Maintenance Due Topic  Shingrix Vaccine Age 50> (1 of 2)  Pneumococcal 0-64 years (2 of 3 - PCV13)  EYE EXAM RETINAL OR DILATED Wt Readings from Last 3 Encounters:  
19 262 lb (118.8 kg) 19 260 lb 8 oz (118.2 kg) 19 261 lb 12.8 oz (118.8 kg) Temp Readings from Last 3 Encounters:  
19 98 °F (36.7 °C)  
19 97.8 °F (36.6 °C)  
19 97.7 °F (36.5 °C) BP Readings from Last 3 Encounters:  
19 179/77  
19 171/83  
19 181/75 Pulse Readings from Last 3 Encounters:  
19 90  
19 90  
19 67 There were no vitals filed for this visit. Learning Assessment:  
:  
 
Learning Assessment 3/25/2014 PRIMARY LEARNER Patient HIGHEST LEVEL OF EDUCATION - PRIMARY LEARNER  2 YEARS OF COLLEGE  
BARRIERS PRIMARY LEARNER NONE  
CO-LEARNER CAREGIVER No  
PRIMARY LANGUAGE ENGLISH  NEED No  
LEARNER PREFERENCE PRIMARY DEMONSTRATION  
LEARNING SPECIAL TOPICS Does does a pacemaker any noise ANSWERED BY patient RELATIONSHIP SELF Depression Screening:  
:  
 
3 most recent PHQ Screens 3/11/2019 Little interest or pleasure in doing things Not at all Feeling down, depressed, irritable, or hopeless Not at all Total Score PHQ 2 0 Fall Risk Assessment:  
:  
 
No flowsheet data found. Abuse Screening:  
:  
 
No flowsheet data found. ADL Screening:  
:  
 
ADL Assessment 11/17/2014 Feeding yourself No Help Needed Getting from bed to chair No Help Needed Getting dressed No Help Needed Bathing or showering No Help Needed Walk across the room (includes cane/walker) No Help Needed Using the telphone No Help Needed Taking your medications No Help Needed Preparing meals No Help Needed Managing money (expenses/bills) No Help Needed Moderately strenuous housework (laundry) No Help Needed Shopping for personal items (toiletries/medicines) No Help Needed Shopping for groceries No Help Needed Driving No Help Needed Climbing a flight of stairs No Help Needed Getting to places beyond walking distances No Help Needed

## 2019-07-10 NOTE — PROGRESS NOTES
HISTORY OF PRESENT ILLNESS Minerva Kim is a 60 y.o. female. HPI  She presents for follow up of diabetes mellitus,diabetes with CKD, retinopathy and neuropathy, hypertension, hyperlipidemia, Atrial Fibrillation, SSS with PM, obesity and venous stasis. Diet and Lifestyle: generally follows a low fat low cholesterol diet, generally follows a low sodium diet, follows a diabetic diet regularly, sedentary, nonsmoker Diabetic ROS - medication compliance: compliant most of the time,  
   home glucose monitoring: , non-fasting ow 100's.  
   home BP Monitoring: Not done 
   further diabetic RS: no polyuria or polydipsia, no unusual visual symptoms, no medication side effects noted, has dysesthesias in the feet, some low blood sugars often in AM. Cardiovascular ROS: 
She complains of exertional chest pressure/discomfort. She denies palpitations, orthopnea, exertional chest pressure/discomfort, claudication, dyspnea on exertion, dizziness She continues care in wound clinic for ulcer on left heel (which was enlarging) and left breast (which was smaller). Patient Active Problem List  
Diagnosis Code  History of breast cancer Z85.3  Asthma J45.909  Fe deficiency anemia D50.9  Status post ablation of atrial fibrillation Z98.890, Z86.79  
 Sick sinus syndrome ([facility]) I49.5  S/P cardiac pacemaker procedure Z95.0  Long term (current) use of anticoagulants Z79.01  
 Mixed hyperlipidemia E78.2  CKD (chronic kidney disease), stage IV ([facility]) N18.4  Systolic murmur Q31.0  Essential hypertension I10  
 PAF (paroxysmal atrial fibrillation) ([facility]) I48.0  Anemia in stage 4 chronic kidney disease ([facility]) N18.4, D63.1  Uncontrolled type 2 diabetes mellitus with stage 4 chronic kidney disease, with long-term current use of insulin ([facility]) E11.22, E11.65, N18.4, Z79.4  Morbid obesity with BMI of 40.0-44.9, adult ([facility]) E66.01, Z68.41  
 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  
Diagnosis Date  Acquired lymphedema Right arm  Arrhythmia  Asthma  Atrial fibrillation ([facility] Dr. Wally Ahuja and Dr. Kirill Simmons [facility])  Cancer ([address] bilateral breast  
 Chronic kidney disease  Diabetes mellitus type II   
 Dizziness  Essential hypertension  Hyperlipidemia  Hypertension  Long term (current) use of anticoagulants  Morbid obesity ([address] 3/14/2012  Osteoarthritis  Pacemaker  Sick sinus syndrome ([address] Past Surgical History:  
Procedure Laterality Date  ABDOMEN SURGERY PROC UNLISTED    
 gastric bypass  GASTRIC BYPASS,OBESE<150CM SAW-EN-Y  7/08  
 [facility]  HX AFIB ABLATION    
 HX CARPAL TUNNEL RELEASE [address] RIGHT MODIFIED RADICAL   
 HX MOHS PROCEDURES  1995  
 right  HX ORTHOPAEDIC    
 ight knee surgery  HX PACEMAKER    
 IR INSERT TUNL CVC W PORT OVER 5 YEARS    
 LAMINECTOMY,CERVICAL  1994  
 NEEDLE BIOPSY LIVER,W OTHR [address]  8.21.07  
 LA [address] AND 1994  LA TRABECULOPLASTY BY LASER SURGERY    
 US GUIDE ASP OVARIAN CYST ABD APPROACH  8.21.07  
 RIGHT Social History Socioeconomic History  Marital status:  Spouse name: Not on file  Number of children: Not on file  Years of education: Not on file  Highest education level: Not on file Tobacco Use  Smoking status: Never Smoker  Smokeless tobacco: Never Used Substance and Sexual Activity  Alcohol use: Yes Comment: rare  Drug use: No  
 
Family History Problem Relation Age of Onset  Cancer Mother  Heart Disease Mother  Alcohol abuse Father  Diabetes Brother  Hypertension Brother Allergies Allergen Reactions  Ace Inhibitors Cough  Amlodipine Swelling  Avapro [Irbesartan] Unable to Obtain  Bactrim [Sulfamethoxazole-Trimethoprim] Other (comments) Sore mouth  Captopril Cough  Carvedilol Diarrhea  Contrast Agent [Iodine] Itching [address]  Morphine Nausea and Vomiting and Swelling  Penicillins Hives  Pravachol [Pravastatin] Nausea Only  Seafood [Shellfish Containing Products] Hives  Singulair [Montelukast] Other (comments)  
  palpitations Current Outpatient Medications Medication Sig Dispense Refill  warfarin (COUMADIN) 4 mg tablet 1 tablet Mon, Fri and a half tablet all others 45 Tab 2  
 hydrALAZINE (APRESOLINE) 100 mg tablet TAKE ONE TABLET BY MOUTH THREE TIMES A DAY 90 Tab 1  
 doxazosin (CARDURA) 4 mg tablet TAKE ONE TABLET BY MOUTH ONCE NIGHTLY 30 Tab 10  
 busPIRone (BUSPAR) 10 mg tablet TAKE ONE TABLET BY MOUTH TWICE A DAY 60 Tab 10  
 bumetanide (BUMEX) 1 mg tablet TAKE ONE TABLET BY MOUTH TWICE A  Tab 3  potassium chloride SR (KLOR-CON 10) 10 mEq tablet TAKE ONE TABLET BY MOUTH DAILY 90 Tab 3  
 sertraline (ZOLOFT) 50 mg tablet TAKE ONE AND ONE-HALF (1 & 1/2) TABLET BY MOUTH DAILY 45 Tab 3  cloNIDine HCl (CATAPRES) 0.3 mg tablet TAKE ONE TABLET BY MOUTH TWICE A DAY 60 Tab 11  
 TRUEPLUS INSULIN 0.5 mL 31 gauge x 5/16\" syrg USE ONE SYRINGE DAILY AS NEEDED.  100 Syringe 0  
 insulin glargine (LANTUS U-100 INSULIN) 100 unit/mL injection 20 Units by SubCUTAneous route daily. 10 mL 5  
 metoprolol succinate (TOPROL-XL) 100 mg tablet TAKE TWO TABLETS BY MOUTH DAILY 60 Tab 10  
 metolazone (ZAROXOLYN) 5 mg tablet Take 1 Tab by mouth daily as needed (take if develop increased LE edema, weight gain > 5 pounds. ). 30 Tab 1  cholecalciferol, VITAMIN D3, (VITAMIN D3) 5,000 unit tab tablet Take 5,000 Units by mouth daily.  vitamin A 8,000 unit capsule Take 8,000 Units by mouth daily.  ACETAMINOPHEN/DIPHENHYDRAMINE (TYLENOL PM PO) Take 2 Tabs by mouth nightly as needed.  insulin lispro (HUMALOG) 100 unit/mL kwikpen 2 units with dinner for sugars over 200 1 Package 0  
 cyanocobalamin (VITAMIN B-12) 1,000 mcg sublingual tablet Take 1,000 mcg by mouth daily.  calcium carbonate (TUMS) 200 mg calcium (500 mg) Chew Take 1 Tab by mouth three (3) times daily (after meals). Review of Systems Constitutional: Negative for malaise/fatigue and weight loss. Gastrointestinal: Negative for constipation and diarrhea. Musculoskeletal: Negative for back pain and joint pain. Neurological: Positive for sensory change (feet). Negative for focal weakness. Visit Vitals /62 (BP 1 Location: Left arm, BP Patient Position: Sitting) Pulse 73 Temp 98.1 °F (36.7 °C) (Oral) Resp 12 Ht 5' 9\" (1.753 m) Wt 260 lb (117.9 kg) SpO2 96% BMI 38.40 kg/m² Physical Exam  
Constitutional: She is oriented to person, place, and time. She appears well-developed and well-nourished. HENT:  
Head: Normocephalic and atraumatic. Eyes: Pupils are equal, round, and reactive to light. Conjunctivae are normal.  
Neck: Neck supple. Carotid bruit is not present. No thyromegaly present. Cardiovascular: Normal rate, regular rhythm and normal heart sounds. PMI is not displaced. Exam reveals no gallop. No murmur heard. Pulses: 
     Dorsalis pedis pulses are 2+ on the right side, and 2+ on the left side. Posterior tibial pulses are 2+ on the right side, and 2+ on the left side. Pulmonary/Chest: Effort normal. She has no wheezes. She has no rhonchi. She has no rales. Abdominal: Soft. Normal appearance. She exhibits no abdominal bruit and no mass. There is no hepatosplenomegaly. There is no tenderness. Musculoskeletal: She exhibits no edema. Lymphadenopathy:  
  She has no cervical adenopathy. Right: No supraclavicular adenopathy present. Left: No supraclavicular adenopathy present. Neurological: She is alert and oriented to person, place, and time. No sensory deficit. Skin: Skin is warm, dry and intact. No rash noted. Psychiatric: She has a normal mood and affect. Her behavior is normal.  
Nursing note and vitals reviewed. Results for orders placed or performed in visit on 07/10/19 AMB POC HEMOGLOBIN A1C Result Value Ref Range Hemoglobin A1c (POC) 7.2 % AMB POC PT/INR Result Value Ref Range VALID INTERNAL CONTROL POC Yes Prothrombin time (POC) 20.2 seconds INR POC 1.7 Lab Results Component Value Date/Time Hemoglobin A1c 7.7 (H) 02/21/2019 03:31 PM  
 Hemoglobin A1c (POC) 7.4 (A) 05/22/2017 03:49 PM  
 
Lab Results Component Value Date/Time Sodium 140 02/21/2019 03:31 PM  
 Potassium 4.7 02/21/2019 03:31 PM  
 Chloride 105 02/21/2019 03:31 PM  
 CO2 22 02/21/2019 03:31 PM  
 Anion gap 10 05/21/2016 04:38 PM  
 Glucose 132 (H) 02/21/2019 03:31 PM  
 BUN 25 (H) 02/21/2019 03:31 PM  
 Creatinine 1.52 (H) 02/21/2019 03:31 PM  
 BUN/Creatinine ratio 16 02/21/2019 03:31 PM  
 GFR est AA 43 (L) 02/21/2019 03:31 PM  
 GFR est non-AA 37 (L) 02/21/2019 03:31 PM  
 Calcium 9.1 02/21/2019 03:31 PM  
 Bilirubin, total 0.3 11/12/2018 10:28 AM  
 AST (SGOT) 9 11/12/2018 10:28 AM  
 Alk.  phosphatase 112 11/12/2018 10:28 AM  
 Protein, total 7.0 11/12/2018 10:28 AM  
 Albumin 4.0 11/12/2018 10:28 AM  
 Globulin 4.1 (H) 05/21/2016 04:38 PM  
 A-G Ratio 1.3 11/12/2018 10:28 AM  
 ALT (SGPT) 8 11/12/2018 10:28 AM  
 
Lab Results Component Value Date/Time Microalb/Creat ratio (ug/mg creat.) 265.0 (H) 11/29/2018 03:57 PM  
 
Lab Results Component Value Date/Time Cholesterol, total 107 11/12/2018 10:28 AM  
 HDL Cholesterol 27 (L) 11/12/2018 10:28 AM  
 LDL, calculated 54 11/12/2018 10:28 AM  
 VLDL, calculated 26 11/12/2018 10:28 AM  
 Triglyceride 129 11/12/2018 10:28 AM  
 
 
ASSESSMENT and PLAN 
  ICD-10-CM ICD-9-CM 1. Controlled type 2 diabetes mellitus with stage 4 chronic kidney disease, with long-term current use of insulin ([facility]) E11.22 250.40 AMB POC HEMOGLOBIN A1C  
 N18.4 585.4 HEMOGLOBIN A1C WITH EAG  
 Z79.4 V58.67   
2. Diabetic polyneuropathy associated with type 2 diabetes mellitus ([facility]) E11.42 250.60   
  357.2 3. Diabetic ulcer of left heel associated with type 2 diabetes mellitus, with fat layer exposed ([facility] E11.621 250.80 L97.422 707.14   
4. Open breast wound, left, initial encounter S21.002A 879.0 5. Essential hypertension I10 401.9 6. PAF (paroxysmal atrial fibrillation) ([facility]) I48.0 427.31 AMB POC PT/INR  
   warfarin (COUMADIN) 4 mg tablet PROTHROMBIN TIME + INR  
   DISCONTINUED: warfarin (COUMADIN) 4 mg tablet 7. Mixed hyperlipidemia E78.2 272.2 LIPID PANEL  
   METABOLIC PANEL, COMPREHENSIVE 8. Sick sinus syndrome ([facility]) I49.5 427.81   
9. Long term (current) use of anticoagulants Z79.01 V58.61   
10. Anemia in stage 4 chronic kidney disease ([facility]) N18.4 285.21   
 D63.1 585.4 11. Morbid obesity with BMI of 40.0-44.9, adult ([facility]) E66.01 278.01   
 Z68.41 V85.41 Diagnoses and all orders for this visit: 1. Controlled type 2 diabetes mellitus with stage 4 chronic kidney disease, with long-term current use of insulin ([facility] Diabetes Mellitus: reasonably well controlled.   
Issues reviewed with her: low cholesterol diet, weight control and daily exercise discussed and glycohemoglobin and other lab monitoring discussed. Is not taking statin. She is intolerant Is not taking ACE/ARB due to CKD -     AMB POC HEMOGLOBIN A1C 
-     HEMOGLOBIN A1C WITH EAG; Future 2. Diabetic polyneuropathy associated with type 2 diabetes mellitus ([facility] Has non-healing ulcer on heel and continues at wound care clinic. 3. Diabetic ulcer of left heel associated with type 2 diabetes mellitus, with fat layer exposed ([facility] Followed at wound care clinic. 4. Open breast wound, left, initial encounter Followed at wound care clinic. 5. Essential hypertension Blood pressure has been hard to control and is on maximum doses of multiple medications and followed by nephrologist.  
 
6. PAF (paroxysmal atrial fibrillation) ([facility] Stable. -     AMB POC PT/INR 
-     warfarin (COUMADIN) 4 mg tablet; 1 tablet Mon, Wed and Fri and a half tablet all others 
-     PROTHROMBIN TIME + INR; Future 7. Mixed hyperlipidemia Hyperlipidemia is controlled -     LIPID PANEL; Future -     METABOLIC PANEL, COMPREHENSIVE; Future 8. Sick sinus syndrome ([facility] Stable. 9. Long term (current) use of anticoagulants 10. Anemia in stage 4 chronic kidney disease ([facility] 11. Morbid obesity with BMI of 40.0-44.9, adult ([facility] Discussed using smaller plate for portion control, keeping a food diary for awareness of food consumed, checking weight often, and increasing physical activity. Will re-evaluate next visit. Follow-up and Dispositions · Return in about 4 months (around 11/10/2019) for DM, HTN, chol  Fasting lab one week prior  NON-fastting lab in 3 weeks (not on Fri) . lab results and schedule of future lab studies reviewed with patient 
reviewed diet, exercise and weight control I have discussed the diagnosis, evaluation and treatment options and the intended plan with the patient. Patient understands and is in agreement. The patient has received an after-visit summary and questions were answered concerning future plans. I have discussed side effects and warnings of any new medications with the patient as well.

## 2019-07-12 ENCOUNTER — HOSPITAL ENCOUNTER (OUTPATIENT)
Dept: WOUND CARE | Age: 59
Discharge: HOME | End: 2019-07-12
Payer: COMMERCIAL

## 2019-07-12 VITALS
TEMPERATURE: 97.7 F | SYSTOLIC BLOOD PRESSURE: 114 MMHG | DIASTOLIC BLOOD PRESSURE: 66 MMHG | RESPIRATION RATE: 16 BRPM | HEART RATE: 81 BPM

## 2019-07-12 PROCEDURE — 74011000250 HC RX REV CODE- 250: Performed by: OTOLARYNGOLOGY

## 2019-07-12 PROCEDURE — 11043 DBRDMT MUSC&/FSCA 1ST 20/<: CPT

## 2019-07-12 RX ORDER — METOPROLOL SUCCINATE 100 MG/1
TABLET, EXTENDED RELEASE ORAL
Qty: 60 TAB | Refills: 9 | Status: ON HOLD | OUTPATIENT
Start: 2019-07-12 | End: 2020-03-22 | Stop reason: SDUPTHER

## 2019-07-12 RX ADMIN — Medication: at 08:37

## 2019-07-12 NOTE — WOUND CARE
07/12/19 0037 Wound Foot Left;Plantar Date First Assessed/Time First Assessed: 06/21/19 0857   Present on Hospital Admission: Yes  Primary Wound Type: Diabetic  Location: Foot  Wound Location Orientation: Left;Plantar Dressing Status Removed Dressing Type Gauze;Gauze wrap (arnie); Special tape (comment) Wound Length (cm) 1.5 cm Wound Width (cm) 1.7 cm Wound Depth (cm) 0.2 cm Wound Volume (cm^3) 0.51 cm^3 Condition of [facility] Condition of Edges Open Drainage Amount Small 
(Changed in morning) Drainage Color Serosanguinous Wound Odor None Sandra-wound Assessment Intact Cleansing and Cleansing Agents  Normal saline Wound Breast Left Date First Assessed/Time First Assessed: 06/07/19 0834   Location: Breast  Wound Location Orientation: Left Dressing Status Removed Dressing Type Vacuum dressing Wound Length (cm) 3.3 cm Wound Width (cm) 5.5 cm Wound Depth (cm) 0.1 cm Wound Volume (cm^3) 1.82 cm^3 Condition of Base Granulation Condition of Edges Open Drainage Amount Moderate Drainage Color Serosanguinous Wound Odor None Cleansing and Cleansing Agents  Normal saline Visit Vitals /66 Pulse 81 Temp 97.7 °F (36.5 °C) Resp 16 LLE Peripheral Vascular Capillary Refill: Less than/equal to 3 seconds (07/12/19 3043) Color: Appropriate for race (07/12/19 5916) Temperature: Warm (07/12/19 1803) Sensation: Present (07/12/19 4439) Pedal Pulse: Palpable (07/12/19 0821)

## 2019-07-12 NOTE — WOUND CARE
07/12/19 8425 Wound Foot Left;Plantar Date First Assessed/Time First Assessed: 06/21/19 0857   Present on Hospital Admission: Yes  Primary Wound Type: Diabetic  Location: Foot  Wound Location Orientation: Left;Plantar Dressing Type Applied  
(parminder, HFBR, gauze, RG) Wound Breast Left Date First Assessed/Time First Assessed: 06/07/19 0834   Location: Breast  Wound Location Orientation: Left Dressing Changed  
(parminder, snap vac, -125 mmhg) Discharged from wound center, ambulatory with assistive device. Has apt for nurse visit on 7/15 and follow up with MD in 1 week.
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
## 2019-07-16 ENCOUNTER — HOSPITAL ENCOUNTER (OUTPATIENT)
Dept: WOUND CARE | Age: 59
Discharge: HOME | End: 2019-07-16
Payer: COMMERCIAL

## 2019-07-16 VITALS
DIASTOLIC BLOOD PRESSURE: 79 MMHG | HEART RATE: 74 BPM | TEMPERATURE: 97 F | SYSTOLIC BLOOD PRESSURE: 161 MMHG | RESPIRATION RATE: 16 BRPM

## 2019-07-16 PROCEDURE — 97607 NEG PRS WND THR NDME<=50SQCM: CPT

## 2019-07-16 NOTE — WOUND CARE
07/16/19 1121 Wound Foot Left;Plantar Date First Assessed/Time First Assessed: 06/21/19 0857   Present on Hospital Admission: Yes  Primary Wound Type: Diabetic  Location: Foot  Wound Location Orientation: Left;Plantar Dressing Status Removed Dressing Type  
(evangelina, hydrofera blue ready, gauze, roll gauze) Condition of Base Pink;Slough Condition of Edges Open Drainage Amount Moderate Drainage Color Serosanguinous Wound Odor None Sandra-wound Assessment Intact Cleansing and Cleansing Agents  Normal saline Dressing Type Applied (Evangelina, hydrofera blue ready, gauze, roll gauze.) Procedure Tolerated Well Wound Breast Left Date First Assessed/Time First Assessed: 06/07/19 0834   Location: Breast  Wound Location Orientation: Left Dressing Status Removed Dressing Type (Evangelina, snap vac 125mm/hg) Condition of Base Granulation Condition of Edges Open Drainage Amount Moderate Drainage Color Serosanguinous Wound Odor None Sandra-wound Assessment Intact Cleansing and Cleansing Agents  Normal saline Dressing Type Applied (Evangelina, snap vac 125mm/hg) Procedure Tolerated Well Visit Vitals /79 Pulse 74 Temp 97 °F (36.1 °C) Resp 16 Patient discharged to home ambulatory, denied pain at time of discharge. She will return on Friday 07/19/19 for MD follow-up.

## 2019-07-19 ENCOUNTER — HOSPITAL ENCOUNTER (OUTPATIENT)
Dept: WOUND CARE | Age: 59
Discharge: HOME | End: 2019-07-19
Payer: COMMERCIAL

## 2019-07-19 VITALS
RESPIRATION RATE: 16 BRPM | DIASTOLIC BLOOD PRESSURE: 78 MMHG | SYSTOLIC BLOOD PRESSURE: 132 MMHG | TEMPERATURE: 97.4 F | HEART RATE: 73 BPM

## 2019-07-19 PROCEDURE — 11043 DBRDMT MUSC&/FSCA 1ST 20/<: CPT

## 2019-07-19 NOTE — PROGRESS NOTES
07/19/19 0835   Wound Foot Left;Plantar   Date First Assessed/Time First Assessed: 06/21/19 0857   Present on Hospital Admission: Yes  Primary Wound Type: Diabetic  Location: Foot  Wound Location Orientation: Left;Plantar   Dressing Status Removed   Wound Length (cm) 1.5 cm   Wound Width (cm) 1.8 cm   Wound Depth (cm) 0.2 cm   Wound Volume (cm^3) 0.54 cm^3   Condition of Base Pink;Slough   Condition of Edges Open   Drainage Amount Moderate   Drainage Color Serosanguinous   Wound Odor None   Cleansing and Cleansing Agents  Soap and water   Wound Breast Left   Date First Assessed/Time First Assessed: 06/07/19 0834   Location: Breast  Wound Location Orientation: Left   Dressing Status Removed   Wound Length (cm) 3 cm   Wound Width (cm) 5 cm   Wound Depth (cm) 0.1 cm   Wound Volume (cm^3) 1.5 cm^3     Visit Vitals  /78   Pulse 73   Temp 97.4 °F (36.3 °C)   Resp 16

## 2019-07-19 NOTE — WOUND CARE
07/19/19 0845   Wound Foot Left;Plantar   Date First Assessed/Time First Assessed: 06/21/19 0857   Present on Hospital Admission: Yes  Primary Wound Type: Diabetic  Location: Foot  Wound Location Orientation: Left;Plantar   Dressing Type Applied   (Evangelina, aquacel AG,exudry, roll gauze)   Wound Breast Left   Date First Assessed/Time First Assessed: 06/07/19 0834   Location: Breast  Wound Location Orientation: Left   Dressing Type Applied   (Snap vac)   Patient discharged to home ambulatory, denied pain at time of discharge. She will return on Tuesday 07/23/19 for nurse visit/dressing change and on Friday 07/26/19 for MD follow-up.
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
## 2019-07-23 ENCOUNTER — HOSPITAL ENCOUNTER (OUTPATIENT)
Dept: WOUND CARE | Age: 59
Discharge: HOME | End: 2019-07-23
Payer: COMMERCIAL

## 2019-07-23 VITALS
TEMPERATURE: 97.7 F | RESPIRATION RATE: 16 BRPM | HEART RATE: 81 BPM | DIASTOLIC BLOOD PRESSURE: 64 MMHG | SYSTOLIC BLOOD PRESSURE: 133 MMHG

## 2019-07-23 PROCEDURE — 97607 NEG PRS WND THR NDME<=50SQCM: CPT

## 2019-07-23 NOTE — WOUND CARE
Patient in for NV  Visit Vitals  /64   Pulse 81   Temp 97.7 °F (36.5 °C)   Resp 16     LLE Peripheral Vascular   Capillary Refill: Less than/equal to 3 seconds (07/23/19 1150)  Color: Appropriate for race (07/23/19 1150)  Temperature: Warm (07/23/19 1150)  Sensation: Present (07/23/19 1150)  Pedal Pulse: Palpable (07/23/19 1150)        07/23/19 1124   Wound Foot Left;Plantar   Date First Assessed/Time First Assessed: 06/21/19 0857   Present on Hospital Admission: Yes  Primary Wound Type: Diabetic  Location: Foot  Wound Location Orientation: Left;Plantar   Dressing Status Removed   Dressing Type Gauze;Gauze wrap (arnie); Special tape (comment)   Condition of Base Pink;Slough   Condition of Edges Open   Drainage Amount Moderate   Drainage Color Serosanguinous   Wound Odor None   Cleansing and Cleansing Agents  Normal saline   Dressing Type Applied Collagens/cell matrix;Silver products; Alginate; Absorptive;Gauze wrap (arnie); Special tape (comment)  (Evangelina Ag, Aquacel Ag, exudry)   Wound Breast Left   Date First Assessed/Time First Assessed: 06/07/19 0834   Location: Breast  Wound Location Orientation: Left   Dressing Status Removed   Condition of Base Granulation   Condition of Edges Open   Drainage Amount Moderate   Drainage Color Serosanguinous   Wound Odor None   Sandra-wound Assessment Intact   Cleansing and Cleansing Agents  Normal saline   Dressing Type Applied Vacuum dressing  (Snap Vac @125 mm HG)       Patient was discharged with Self to home and was with cane.  In stable condition with c/o pain:_0_/10_

## 2019-07-26 ENCOUNTER — HOSPITAL ENCOUNTER (OUTPATIENT)
Dept: WOUND CARE | Age: 59
Discharge: HOME | End: 2019-07-26
Payer: COMMERCIAL

## 2019-07-26 VITALS
HEART RATE: 82 BPM | DIASTOLIC BLOOD PRESSURE: 68 MMHG | TEMPERATURE: 96.7 F | SYSTOLIC BLOOD PRESSURE: 133 MMHG | RESPIRATION RATE: 16 BRPM

## 2019-07-26 PROCEDURE — 11043 DBRDMT MUSC&/FSCA 1ST 20/<: CPT

## 2019-07-26 PROCEDURE — 74011000250 HC RX REV CODE- 250: Performed by: OTOLARYNGOLOGY

## 2019-07-26 RX ADMIN — Medication: at 09:02

## 2019-07-26 NOTE — WOUND CARE
07/26/19 0851   Wound Foot Left;Plantar   Date First Assessed/Time First Assessed: 06/21/19 0857   Present on Hospital Admission: Yes  Primary Wound Type: Diabetic  Location: Foot  Wound Location Orientation: Left;Plantar   Dressing Status Removed   Dressing Type Aquacel; Absorptive;Gauze;Gauze wrap (arnie); Special tape (comment)   Wound Length (cm) 1.5 cm   Wound Width (cm) 1.8 cm   Wound Depth (cm) 0.3 cm   Wound Volume (cm^3) 0.81 cm^3   Condition of Base Pink;Slough   Condition of Edges Calloused; Open   Drainage Amount Moderate   Drainage Color Serosanguinous   Wound Odor None   Sandra-wound Assessment Blanchable erythema   Cleansing and Cleansing Agents  Normal saline   Wound Breast Left   Date First Assessed/Time First Assessed: 06/07/19 0834   Location: Breast  Wound Location Orientation: Left   Dressing Status Removed   Dressing Type Vacuum dressing   Wound Length (cm) 2.7 cm   Wound Width (cm) 4.5 cm   Wound Depth (cm) 0.1 cm   Wound Volume (cm^3) 1.22 cm^3   Condition of Base Granulation   Condition of Edges Open   Drainage Amount Moderate   Drainage Color Serosanguinous   Wound Odor None   Sandra-wound Assessment Intact   Cleansing and Cleansing Agents  Normal saline     Visit Vitals  /68   Pulse 82   Temp 96.7 °F (35.9 °C)   Resp 16     LLE Peripheral Vascular   Capillary Refill: Less than/equal to 3 seconds (07/26/19 0850)  Color: Appropriate for race (07/26/19 0850)  Temperature: Warm (07/26/19 0850)  Sensation: Present (07/26/19 0850)  Pedal Pulse: Palpable (07/26/19 0850)

## 2019-07-26 NOTE — PROGRESS NOTES
[address] [facility]  WOUND CARE PROGRESS NOTE    Name:  Hank Teresa  MR#:  [id number]  :  1960  ACCOUNT #:  [de-identified]  DATE OF SERVICE:  2019      SUBJECTIVE:  The patient returns for treatment of 2 wounds. The wound to the left breast followed trauma from a fall S21.002D and she has a left heel diabetic foot ulcer E11.621. Upon questioning, she had a good week other than 5 days ago she was sick for 24 hours and since then she has been feeling quite well. She has no discomfort at all with her left breast, but this is an area without sensation. The foot itself tends to be insensate, but she still has tenderness in the left lateral distal portion of the wound. There had been no changes noted in her medications, allergies or review of systems. OBJECTIVE:  VITAL SIGNS:  Her temperature is 96.7, pulse 82, respirations 16, blood pressure 133/68. WOUND EXAMINATION:  The wound of the left breast is improved today to 2.7 x 4.5 x 0.1 cm. It is clean and not in need of debridement. The wound to the left plantar foot has areas of devitalized tissue overlying the muscle and there is callus and epibole surrounding the wound. It is recommended that the wound be debrided and the callus pared down. I explained the risks and benefits. The patient understood and wished to proceed. Therefore, topical Xylocaine 4% gel was applied for 10 minutes. Using iris scissors and forceps, the devitalized tissue was taken down off of the fascia and muscle. The surface of the wound was completely debrided down to healthy fascia and muscle to remove all devitalized tissue. The edges of the wound were pared down and the epibole was stripped out to get back to healthy actively growing skin cells. The wound following debridement measured 1.9 x 1.9 x 0.3 cm. The wound was then scrubbed with chlorhexidine followed by normal saline.     ASSESSMENT AND PLAN:  We will continue to use the SNaP VAC for the left breast.  The drainage has cut down significantly, but we will plan on seeing her in the office in 4 days. If the drainage is not significant, we will simply see her again in a week. For the left heel, we are going to continue Evangelina Ag and Aquacel Ag. We are also going to apply windowpane foam around the wound to try to take some more pressure off of it, even though she is using the Darco heel offloading boot. We will use Exu-Dry to help manage secretions. She will keep her leg elevated. She will continue to manage her diet and blood sugars and we will see her again in followup in 1 week. All questions were answered. Her condition on discharge is stable.       MD SHEEBA [email or web address]  D:  07/26/2019 9:33  T:  07/26/2019 9:43  JOB #:  [id number]

## 2019-07-26 NOTE — WOUND CARE
OUTPATIENT WOUND CARE DISCHARGE NOTE       07/26/19 0915   Wound Foot Left;Plantar   Date First Assessed/Time First Assessed: 06/21/19 0857   Present on Hospital Admission: Yes  Primary Wound Type: Diabetic  Location: Foot  Wound Location Orientation: Left;Plantar   Cleansing and Cleansing Agents  Normal saline   Dressing Type Applied Alginate;Collagens/cell matrix;Foam;Silver products  (Evangelina ag,Aquacel ag, Picture framed w/foam, Exudry,Gauze.)   Wound Breast Left   Date First Assessed/Time First Assessed: 06/07/19 0834   Location: Breast  Wound Location Orientation: Left   Cleansing and Cleansing Agents  Normal saline   Dressing Type Applied Collagens/cell matrix;Silver products; Vacuum dressing;Negative pressure wound therapy  (Evangelina ag, Snap Vac- (125 mmHg))       Wound Dressing Applied - Per order, as noted above. Pain - Denies pain    Ambulatory Aid - Cane    Accompanied by - Self    Follow Up Appointments - 1 week    Discharge Instructions Reviewed. Instructed to call The [address] [place] [address] Road with any questions or concerns. Patient  Verbalizes understanding.

## 2019-07-30 ENCOUNTER — HOSPITAL ENCOUNTER (OUTPATIENT)
Dept: WOUND CARE | Age: 59
Discharge: HOME | End: 2019-07-30
Payer: COMMERCIAL

## 2019-07-30 VITALS
TEMPERATURE: 97.4 F | HEART RATE: 86 BPM | RESPIRATION RATE: 16 BRPM | SYSTOLIC BLOOD PRESSURE: 125 MMHG | DIASTOLIC BLOOD PRESSURE: 65 MMHG

## 2019-07-30 DIAGNOSIS — I48.0 PAF (PAROXYSMAL ATRIAL FIBRILLATION) (HCC): ICD-10-CM

## 2019-07-30 PROCEDURE — 97607 NEG PRS WND THR NDME<=50SQCM: CPT

## 2019-07-30 NOTE — WOUND CARE
07/30/19 1031   Wound Foot Right;Plantar;Lateral 07/30/19   Date First Assessed/Time First Assessed: 07/30/19 1034   Present on Hospital Admission: Yes  Wound Approximate Age at First Assessment (Weeks): 1 weeks  Primary Wound Type: Diabetic Ulcer  Location: Foot  Wound Location Orientation: Right;Plantar;Late. .. Dressing Status Removed   Dressing Type Gauze;Gauze wrap (arnie)   Wound Length (cm) 1 cm   Wound Width (cm) 0.6 cm   Wound Depth (cm) 0.2 cm   Wound Volume (cm^3) 0.12 cm^3   Condition of Base Pink   Condition of Edges Calloused; Open   Drainage Amount Moderate   Drainage Color Serosanguinous   Wound Odor None   Sandra-wound Assessment Intact   Cleansing and Cleansing Agents  Normal saline   Dressing Type Applied   (parminder, aquacel, foam windowpane, gauze, rolled gauze)   Wound Foot Left;Plantar   Date First Assessed/Time First Assessed: 06/21/19 0857   Present on Hospital Admission: Yes  Primary Wound Type: Diabetic  Location: Foot  Wound Location Orientation: Left;Plantar   Dressing Status Removed   Dressing Type   (parminder, aquacel, foam gauze rolled gauze)   Condition of Base Pink;Slough   Condition of Edges Calloused   Drainage Amount Moderate   Drainage Color Serosanguinous   Wound Odor None   Sandra-wound Assessment Intact   Cleansing and Cleansing Agents  Normal saline   Dressing Type Applied   (parminder, aquacel, foam windowpane, gauze, rolled gauze)   Wound Breast Left   Date First Assessed/Time First Assessed: 06/07/19 0834   Location: Breast  Wound Location Orientation: Left   Dressing Status Removed   Dressing Type Vacuum dressing   Condition of Base Granulation   Condition of Edges Open   Drainage Amount Moderate   Drainage Color Serosanguinous   Wound Odor None   Sandra-wound Assessment Intact   Dressing Type Applied   (snap vac)       Pt in for nursing visit for dressing change      Patient was discharged with Self to home and was with cane.  In stable condition with c/o pain:_0_/10_

## 2019-07-31 DIAGNOSIS — I48.0 PAF (PAROXYSMAL ATRIAL FIBRILLATION) (HCC): ICD-10-CM

## 2019-07-31 LAB
INR PPP: 4.3 (ref 0.8–1.2)
PROTHROMBIN TIME: 41.4 SEC (ref 9.1–12)

## 2019-07-31 RX ORDER — WARFARIN 4 MG/1
TABLET ORAL
Qty: 45 TAB | Refills: 2
Start: 2019-07-31 | End: 2019-08-09 | Stop reason: DRUGHIGH

## 2019-07-31 NOTE — PROGRESS NOTES
Inform patient INR is much too high. Confirm current dose of warfarin 5 mg tablets 1 tablet Mon, Wed and Fri and a half tablet all others We only increased by a half tablet (2.5 mg) per week, so this great a change is unexpected. Make sure medication is taken daily and high vitamin K containing foods are eaten in consistent amounts. Change warfarin 5 mg tablets to none today (7/31), a half tablet tomorrow (8/1), then 1 tablet on Mon and Thurs and a half tablet the other 5 days . Repeat INR in  10 days. Message also sent in My Chart.

## 2019-08-02 ENCOUNTER — HOSPITAL ENCOUNTER (OUTPATIENT)
Dept: WOUND CARE | Age: 59
Discharge: HOME | End: 2019-08-02
Payer: COMMERCIAL

## 2019-08-02 VITALS — SYSTOLIC BLOOD PRESSURE: 126 MMHG | DIASTOLIC BLOOD PRESSURE: 76 MMHG | RESPIRATION RATE: 16 BRPM | TEMPERATURE: 96.9 F

## 2019-08-02 DIAGNOSIS — L97.422 DIABETIC ULCER OF LEFT HEEL ASSOCIATED WITH TYPE 2 DIABETES MELLITUS, WITH FAT LAYER EXPOSED (HCC): ICD-10-CM

## 2019-08-02 DIAGNOSIS — S81.811D LACERATION OF RIGHT LEG EXCLUDING THIGH, SUBSEQUENT ENCOUNTER: ICD-10-CM

## 2019-08-02 DIAGNOSIS — E11.621 DIABETIC ULCER OF LEFT HEEL ASSOCIATED WITH TYPE 2 DIABETES MELLITUS, WITH FAT LAYER EXPOSED (HCC): ICD-10-CM

## 2019-08-02 DIAGNOSIS — S21.002A OPEN BREAST WOUND, LEFT, INITIAL ENCOUNTER: ICD-10-CM

## 2019-08-02 PROCEDURE — 11042 DBRDMT SUBQ TIS 1ST 20SQCM/<: CPT

## 2019-08-02 PROCEDURE — 74011000250 HC RX REV CODE- 250: Performed by: OTOLARYNGOLOGY

## 2019-08-02 RX ADMIN — Medication: at 09:05

## 2019-08-02 NOTE — PROGRESS NOTES
[address] 
WOUND CARE PROGRESS NOTE Name:  Satnam Hudson 
MR#:  [id number] :  1960 ACCOUNT #:  [de-identified] DATE OF SERVICE:  2019 SUBJECTIVE:  The patient returns for treatment of a wound to the left breast S21.002D, and a diabetic foot ulcer to left heel E11.621. Over the week the patient, unfortunately, since she has been wearing a Darco offloading shoe on the left side, had developed a new wound of the right plantar lateral foot. She then resumed wearing her sneakers and there had been no other changes noted in her medications, allergies, or review of systems. OBJECTIVE:  Her temperature is 96.9, pulse 86, respirations 16, blood pressure 126/76. The new wound of the right lateral plantar foot measures 1 x 0.5 x 0.2 cm. It is surrounded by callus and it is recommended this callus be pared down and the wound be debrided. The left plantar heel wound today measures 1.5 x 0.5 x 0.3 cm. The edges of this wound circumferentially are undermined and macerated and is recommended this wound be debrided of all devitalized tissue and the edges of the wound stripped out and lastly the wound of left breast measures 2.2 x 4.4 x 0.1 cm, which is clean and healing in quite well. I recommended debridement of the left heel heel and right lateral foot ulcers. I explained the risks and benefits. The patient understood and wished to proceed. Therefore, topical Xylocaine 4% gel was applied for 10 minutes. Using forceps and a 15-blade, the edges of the left heel wound were stripped out to get back to healthy bleeding skin edges. The surface wound was debrided of all devitalized tissue. There is no palpable collection of fluid. There is no periwound erythema, but there is significant tenderness, where she has always had this since the beginning of this wound, which is distally and laterally.   Dimensions of the left heel wound following debridement were 1.5 x 2.2 x 0.4 cm. The right lateral foot wound was then debrided to get back to healthy skin edges and a clean subcutaneous wound base and the post-debridement dimensions were 0.9 x 0.8 x 0.3 cm. ASSESSMENT/PLAN:  For the left breast wound, we are going to continue the wound VAC for at least another week. For the left heel, we are going to change the dressing since it is still macerated and stop the Evangelina and simply use Hydrofera blue classic which will be cut to size into the wound. The same dressing will be used on the right side and gauze will be placed over this wound also. I made cutouts of felt for both feet and then I made a cutout for the left heel wound and the lateral right foot wound and placed these into her shoes so that they fit well, and she is now able to offload these areas and hopefully this will offer her protection and relief and will assist in healing. I have asked the patient to call Dr. Roby Webb who is managing her diabetes, to see if she can help by ordering diabetic shoes for the patient, which will be needed once the patient has healed. Unfortunately, these orders must originate from the physician treating diabetes, and not a wound care doctor. Next week, I am asking the patient to return to the office on Tuesday to be seen by Dr. Sunni Arceo for her input to see if she has any other recommendations or if we should change our treatment plan. The patient then will return in 7 days for a nursing visit when I am here and I will see her as needed. I spent extra time with the patient today by working on her shoes as well as debriding the new wound. All questions were answered. Her condition on discharge is stable. MD SHEEBA Armenta/[email or web address] 
D:  08/02/2019 10:19 
T:  08/02/2019 10:29 
JOB #:  [id number]

## 2019-08-02 NOTE — WOUND CARE
08/02/19 1000 Wound Foot Right;Plantar;Lateral 07/30/19 Date First Assessed/Time First Assessed: 07/30/19 1034   Present on Hospital Admission: Yes  Wound Approximate Age at First Assessment (Weeks): 1 weeks  Primary Wound Type: Diabetic Ulcer  Location: Foot  Wound Location Orientation: Right;Plantar;Late. .. Dressing Type Applied Foam;Gauze wrap (arnie); Special tape (comment) (Hydrofera blue classic moist saline) Wound Foot Left;Plantar Date First Assessed/Time First Assessed: 06/21/19 0857   Present on Hospital Admission: Yes  Primary Wound Type: Diabetic  Location: Foot  Wound Location Orientation: Left;Plantar Dressing Type Applied Foam;Gauze (Hydrofera blue classic moist saline) Wound Breast Left Date First Assessed/Time First Assessed: 06/07/19 0834   Location: Breast  Wound Location Orientation: Left Dressing Type Applied Vacuum dressing 
(snap vac @125 mmhg) Patient was discharged with Self to home and was ambulatory. In stable condition with c/o pain:__0/10_

## 2019-08-06 ENCOUNTER — HOSPITAL ENCOUNTER (OUTPATIENT)
Dept: WOUND CARE | Age: 59
Discharge: HOME | End: 2019-08-06
Payer: COMMERCIAL

## 2019-08-06 VITALS
TEMPERATURE: 98 F | RESPIRATION RATE: 16 BRPM | SYSTOLIC BLOOD PRESSURE: 172 MMHG | HEART RATE: 86 BPM | DIASTOLIC BLOOD PRESSURE: 84 MMHG

## 2019-08-06 PROBLEM — E11.621 DIABETIC ULCER OF RIGHT MIDFOOT ASSOCIATED WITH TYPE 2 DIABETES MELLITUS, WITH FAT LAYER EXPOSED (HCC): Status: ACTIVE | Noted: 2019-08-06

## 2019-08-06 PROBLEM — L97.412 DIABETIC ULCER OF RIGHT MIDFOOT ASSOCIATED WITH TYPE 2 DIABETES MELLITUS, WITH FAT LAYER EXPOSED (HCC): Status: ACTIVE | Noted: 2019-08-06

## 2019-08-06 PROCEDURE — 74011000250 HC RX REV CODE- 250: Performed by: PODIATRIST

## 2019-08-06 PROCEDURE — 11042 DBRDMT SUBQ TIS 1ST 20SQCM/<: CPT

## 2019-08-06 RX ADMIN — Medication: at 15:15

## 2019-08-06 NOTE — WOUND CARE
OUTPATIENT WOUND CARE DISCHARGE NOTE 
 
 
 08/06/19 1530 Wound Foot Left;Plantar Date First Assessed/Time First Assessed: 06/21/19 0857   Present on Hospital Admission: Yes  Primary Wound Type: Diabetic  Location: Foot  Wound Location Orientation: Left;Plantar Dressing Type Applied (Aquacel AG, TCC) Wound Foot Right;Plantar;Lateral 07/30/19 Date First Assessed/Time First Assessed: 07/30/19 1034   Present on Hospital Admission: Yes  Wound Approximate Age at First Assessment (Weeks): 1 weeks  Primary Wound Type: Diabetic Ulcer  Location: Foot  Wound Location Orientation: Right;Plantar;Late. .. Dressing Type Applied (Aquacel AG, exudry, roll gauze, ) Wound Dressing Applied - Per order, as noted above. Pain - Denies pain at time of discharge Accompanied by -  Follow Up Appointments - Nurse visit on Friday, August 9th & F/U w/Dr. Michael Guerin in 1 week. Discharge Instructions Reviewed. Instructed to call The [address] [place] [address] with any questions or concerns. Patient  Verbalizes understanding.

## 2019-08-06 NOTE — PROGRESS NOTES
Patient presents to wound clinic for: Sammy Stout is a 60 y.o. female who presents for wound care of bilateral plantar foot ulcers. Patient currently being treated by Dr. Tawanda Clarke for a wound of the left breast, and was referred to me for further offloading and wound care recommendations of the bilateral heel ulcers. The ulcer of the plantar left heel occurred first and patient was placed in a heel offloading shoe. She then subsequently developed an ulceration of the plantar 5th met base of the right foot. She notes that the wounds are painful when ambulating. She has been wearing her sneakers with her offloading pads. She has not had any other changes since she saw Dr. Tawanda Clarke last Friday. Pertinent Medical History: 
Past Medical History:  
Diagnosis Date  Acquired lymphedema Right arm  Arrhythmia  Asthma  Atrial fibrillation ([facility] [address] Dr. Kennedy Gudino and Dr. [facility])  Cancer ([facility] [address] bilateral breast  
 Chronic kidney disease  Diabetes mellitus type II   
 Dizziness  Essential hypertension  Hyperlipidemia  Hypertension  Long term (current) use of anticoagulants  Morbid obesity ([address] 3/14/2012  Osteoarthritis  Pacemaker  Sick sinus syndrome ([facility] [address] Past Surgical History:  
Procedure Laterality Date  ABDOMEN SURGERY PROC UNLISTED    
 gastric bypass  GASTRIC BYPASS,OBESE<150CM SWA-EN-Y  7/08  
 kaitlincher  HX AFIB ABLATION    
 HX CARPAL TUNNEL RELEASE [address] [address] RIGHT MODIFIED RADICAL   
 HX MOHS PROCEDURES  1995  
 right  HX ORTHOPAEDIC    
 ight knee surgery  HX PACEMAKER    
 IR INSERT TUNL CVC W PORT OVER 5 YEARS    
 LAMINECTOMY,CERVICAL  1994  
 NEEDLE BIOPSY LIVER,W OTHR [address]  8.21.07  
 DC [address] AND 1994  DC TRABECULOPLASTY BY LASER SURGERY  US GUIDE ASP OVARIAN CYST ABD APPROACH  8.21.07  
 RIGHT Prior to Admission medications Medication Sig Start Date End Date Taking? Authorizing Provider  
warfarin (COUMADIN) 4 mg tablet Take none today (7/31), a half tablet tomorrow (8/1), then 1 tablet on Mon and Thurs and a half tablet the other 5 days 7/31/19   Almita Dang MD  
metoprolol succinate (TOPROL-XL) 100 mg tablet TAKE TWO TABLETS BY MOUTH DAILY 7/12/19   Almita Dang MD  
hydrALAZINE (APRESOLINE) 100 mg tablet TAKE ONE TABLET BY MOUTH THREE TIMES A DAY 6/24/19   Amara Bloom NP  
doxazosin (CARDURA) 4 mg tablet TAKE ONE TABLET BY MOUTH ONCE NIGHTLY 6/14/19   Almita Dang MD  
busPIRone (BUSPAR) 10 mg tablet TAKE ONE TABLET BY MOUTH TWICE A DAY 5/24/19   Almita Dang MD  
bumetanide (BUMEX) 1 mg tablet TAKE ONE TABLET BY MOUTH TWICE A DAY 4/25/19   Victor Hugo Cano MD  
potassium chloride SR (KLOR-CON 10) 10 mEq tablet TAKE ONE TABLET BY MOUTH DAILY 4/22/19   Victor Hugo Cano MD  
sertraline (ZOLOFT) 50 mg tablet TAKE ONE AND ONE-HALF (1 & 1/2) TABLET BY MOUTH DAILY 4/17/19   Almita Dang MD  
cloNIDine HCl (CATAPRES) 0.3 mg tablet TAKE ONE TABLET BY MOUTH TWICE A DAY 3/4/19   Almita Dang MD  
TRUEPLUS INSULIN 0.5 mL 31 gauge x 5/16\" syrg USE ONE SYRINGE DAILY AS NEEDED. 3/4/19   Almita Dang MD  
insulin glargine (LANTUS U-100 INSULIN) 100 unit/mL injection 20 Units by SubCUTAneous route daily. 11/13/18   Almita Dang MD  
metolazone (ZAROXOLYN) 5 mg tablet Take 1 Tab by mouth daily as needed (take if develop increased LE edema, weight gain > 5 pounds. ). 4/10/14   Tonny Burrows NP  
cholecalciferol, VITAMIN D3, (VITAMIN D3) 5,000 unit tab tablet Take 5,000 Units by mouth daily. Provider, Historical  
vitamin A 8,000 unit capsule Take 8,000 Units by mouth daily.     Provider, Historical  
 ACETAMINOPHEN/DIPHENHYDRAMINE (TYLENOL PM PO) Take 2 Tabs by mouth nightly as needed. Provider, Historical  
insulin lispro (HUMALOG) 100 unit/mL kwikpen 2 units with dinner for sugars over 200 1/3/14   Flex Montanez MD  
cyanocobalamin (VITAMIN B-12) 1,000 mcg sublingual tablet Take 1,000 mcg by mouth daily. Provider, Historical  
calcium carbonate (TUMS) 200 mg calcium (500 mg) Chew Take 1 Tab by mouth three (3) times daily (after meals). Provider, Historical  
 
Allergies Allergen Reactions  Ace Inhibitors Cough  Amlodipine Swelling  Avapro [Irbesartan] Unable to Obtain  Bactrim [Sulfamethoxazole-Trimethoprim] Other (comments) Sore mouth  Captopril Cough  Carvedilol Diarrhea  Contrast Agent [Iodine] Itching [address]  Morphine Nausea and Vomiting and Swelling  Penicillins Hives  Pravachol [Pravastatin] Nausea Only  Seafood [Shellfish Containing Products] Hives  Singulair [Montelukast] Other (comments)  
  palpitations Family History Problem Relation Age of Onset  Cancer Mother  Heart Disease Mother  Alcohol abuse Father  Diabetes Brother  Hypertension Brother Social History Socioeconomic History  Marital status:  Spouse name: Not on file  Number of children: Not on file  Years of education: Not on file  Highest education level: Not on file Occupational History  Not on file Social Needs  Financial resource strain: Not on file  Food insecurity:  
  Worry: Not on file Inability: Not on file  Transportation needs:  
  Medical: Not on file Non-medical: Not on file Tobacco Use  Smoking status: Never Smoker  Smokeless tobacco: Never Used Substance and Sexual Activity  Alcohol use: Yes Comment: rare  Drug use: No  
 Sexual activity: Not on file Lifestyle  Physical activity:  
  Days per week: Not on file Minutes per session: Not on file  Stress: Not on file Relationships  Social connections:  
  Talks on phone: Not on file Gets together: Not on file Attends Mandaen service: Not on file Active member of club or organization: Not on file Attends meetings of clubs or organizations: Not on file Relationship status: Not on file  Intimate partner violence:  
  Fear of current or ex partner: Not on file Emotionally abused: Not on file Physically abused: Not on file Forced sexual activity: Not on file Other Topics Concern  Not on file Social History Narrative  Not on file Vitals:  
 08/06/19 1451 BP: 172/84 Pulse: 86 Resp: 16 Temp: 98 °F (36.7 °C) Review of Systems: 
 
Gen: No fever, chills, malaise, weight loss/gain. Heent: No headache, rhinorrhea, epistaxis, ear pain, hearing loss, sinus pain, neck pain/stiffness, sore throat. Heart: No chest pain, palpitations, CHOW, pnd, or orthopnea. Resp: No cough, hemoptysis, wheezing and shortness of breath. GI: No nausea, vomiting, diarrhea, constipation, melena or hematochezia. : No urinary obstruction, dysuria or hematuria. Derm: see below Musc/skeletal: no bone or joint complains. Vasc: No edema, cyanosis or claudication. Endo: No heat/cold intolerance, no polyuria,polydipsia or polyphagia. Neuro: No unilateral weakness, numbness, tingling. No seizures. Heme: No easy bruising or bleeding. Wound Description: Wound Foot Left;Plantar Date First Assessed/Time First Assessed: 06/21/19 0857   Present on Hospital Admission: Yes  Primary Wound Type: Diabetic  Location: Foot  Wound Location Orientation: Left;Plantar Dressing Status Removed Dressing Type Foam;Gauze;Gauze wrap (arnie); Special tape (comment) Wound Length (cm) 1.6 cm Wound Width (cm) 1.9 cm Wound Depth (cm) 0.1 cm Wound Volume (cm^3) 0.3 cm^3 Condition of [facility] Condition of Edges Calloused; Open Drainage Amount Moderate Drainage Color Serosanguinous Wound Odor None Sandra-wound Assessment Maceration Cleansing and Cleansing Agents  Normal saline Wound Foot Right;Plantar;Lateral 07/30/19 Date First Assessed/Time First Assessed: 07/30/19 1034   Present on Hospital Admission: Yes  Wound Approximate Age at First Assessment (Weeks): 1 weeks  Primary Wound Type: Diabetic Ulcer  Location: Foot  Wound Location Orientation: Right;Plantar;Late. .. Dressing Status Removed Dressing Type Foam;Gauze;Gauze wrap (arnie); Special tape (comment) Wound Length (cm) 1 cm Wound Width (cm) 0.8 cm Wound Depth (cm) 0.2 cm Wound Volume (cm^3) 0.16 cm^3 Condition of Base Pink;Slough Condition of Edges Calloused; Open Undermining (cm) 0.1 cm Drainage Amount Moderate Drainage Color Serosanguinous Wound Odor None Sandra-wound Assessment Maceration Cleansing and Cleansing Agents  Normal saline Debridement: required today for wound healing Ulcer Debridement Left plantar foot wound Character of Ulcer: Deteriorated Indication for Debridement: Slough, Exudate, Abnormal wound edge and Abnormal Wound base Pre debridement measurements: 1.6 cm x 1.0 cm x 0.1 cm Risks of procedure were discussed with patient. Consent has been signed. Anesthetic: Lidocaine 4% topical cream  
 
Level of Debridement: Subctaneous Tissue Tissue and/or Material removed: Exudate, Fibrin/ Slough and Subcutaneous Type of Tissue: Non- Viable Pre-debridement severity: Fat Layer Exposed Post debridement severity: Fat Layer exposed Instrument(s) used: Scalpel Bleeding controlled with: Pressure Estimated blood loss: Minimal 
 
Pre debridement measurements: 1.7 cm x 2.0 cm x 0.2 cm Post procedural pain: mild Patient tolerated procedure well. Right plantar foot wound Character of Ulcer: Unchanged Indication for Debridement: Slough, Exudate, Abnormal wound edge and Abnormal Wound base Pre debridement measurements: 1 cm x 0.8 cm x 0.2 cm Risks of procedure were discussed with patient. Consent has been signed. Anesthetic: Lidocaine 4% topical cream  
 
Level of Debridement: Subctaneous Tissue Tissue and/or Material removed: Callus, Exudate and [address] Type of Tissue: Non- Viable Pre-debridement severity: Fat Layer Exposed Post debridement severity: Fat Layer exposed Instrument(s) used: Scalpel Bleeding controlled with: Pressure Estimated blood loss: Minimal 
 
Pre debridement measurements: 1.1 cm x 0.9 cm x 0.3 cm Post procedural pain: none Patient tolerated procedure well. Infection: no 
 
Edema: mild edema Lower Extremity Circulation Reviewed patient's recent ABIs. Shows decrease from when the  
 
Offloading: Yes, will start TCC on LLE, and continue shoe with offloading padding to the RLE Assessment: 1. Diabetic plantar heel ulcer left foot 2. Diabetic ulcer right foot-plantar 5th met base Plan:  
-Patient seen and evaluated as noted above. 
-Wound debridement performed. 
-Patient needs further offloading. Will start TCC to LLE. Additional padding added to the patient's right shoe as well. 
-Follow-up 1 week.

## 2019-08-06 NOTE — WOUND CARE
08/06/19 1456 Wound Breast Left Date First Assessed/Time First Assessed: 06/07/19 0834   Location: Breast  Wound Location Orientation: Left Dressing Status Removed Dressing Type Vacuum dressing 
(snap vac @125 mm Hg) Condition of Base Granulation Condition of Edges Open Drainage Amount Moderate Drainage Color Serosanguinous Wound Odor None Sandra-wound Assessment Intact Cleansing and Cleansing Agents  Normal saline Dressing Type Applied Vacuum dressing 
(snap vac @125 mm Hg) Wound Foot Left;Plantar Date First Assessed/Time First Assessed: 06/21/19 0857   Present on Hospital Admission: Yes  Primary Wound Type: Diabetic  Location: Foot  Wound Location Orientation: Left;Plantar Dressing Status Removed Dressing Type Foam;Gauze;Gauze wrap (arnie); Special tape (comment) Wound Length (cm) 1.6 cm Wound Width (cm) 1.9 cm Wound Depth (cm) 0.1 cm Wound Volume (cm^3) 0.3 cm^3 Condition of [facility] Condition of Edges Calloused; Open Drainage Amount Moderate Drainage Color Serosanguinous Wound Odor None Sandra-wound Assessment Maceration Cleansing and Cleansing Agents  Normal saline Wound Foot Right;Plantar;Lateral 07/30/19 Date First Assessed/Time First Assessed: 07/30/19 1034   Present on Hospital Admission: Yes  Wound Approximate Age at First Assessment (Weeks): 1 weeks  Primary Wound Type: Diabetic Ulcer  Location: Foot  Wound Location Orientation: Right;Plantar;Late. .. Dressing Status Removed Dressing Type Foam;Gauze;Gauze wrap (arnie); Special tape (comment) Wound Length (cm) 1 cm Wound Width (cm) 0.8 cm Wound Depth (cm) 0.2 cm Wound Volume (cm^3) 0.16 cm^3 Condition of Base Pink;Slough Condition of Edges Calloused; Open Undermining (cm) 0.1 cm Drainage Amount Moderate Drainage Color Serosanguinous Wound Odor None Sandra-wound Assessment Maceration Cleansing and Cleansing Agents  Normal saline Visit Vitals /84 Pulse 86  
 Temp 98 °F (36.7 °C) Resp 16 LLE Peripheral Vascular Capillary Refill: Less than/equal to 3 seconds (08/06/19 1455) Color: Appropriate for race (08/06/19 1455) Temperature: Warm (08/06/19 1455) Sensation: Present (08/06/19 1455) Pedal Pulse: Palpable (08/06/19 1455) RLE Peripheral Vascular Capillary Refill: Less than/equal to 3 seconds (08/06/19 1455) Color: Appropriate for race (08/06/19 1455) Temperature: Warm (08/06/19 1455) Sensation: Present (08/06/19 1455) Pedal Pulse: Palpable (08/06/19 1455)

## 2019-08-09 ENCOUNTER — APPOINTMENT (OUTPATIENT)
Dept: GENERAL RADIOLOGY | Age: 59
DRG: 872 | End: 2019-08-09
Attending: PHYSICIAN ASSISTANT
Payer: COMMERCIAL

## 2019-08-09 ENCOUNTER — APPOINTMENT (OUTPATIENT)
Dept: ULTRASOUND IMAGING | Age: 59
DRG: 872 | End: 2019-08-09
Attending: PHYSICIAN ASSISTANT
Payer: COMMERCIAL

## 2019-08-09 ENCOUNTER — HOSPITAL ENCOUNTER (OUTPATIENT)
Dept: WOUND CARE | Age: 59
Discharge: HOME | End: 2019-08-09
Payer: COMMERCIAL

## 2019-08-09 ENCOUNTER — HOSPITAL ENCOUNTER (INPATIENT)
Age: 59
LOS: 3 days | Discharge: HOME | DRG: 872 | End: 2019-08-12
Attending: EMERGENCY MEDICINE | Admitting: HOSPITALIST
Payer: COMMERCIAL

## 2019-08-09 VITALS
DIASTOLIC BLOOD PRESSURE: 69 MMHG | HEART RATE: 67 BPM | RESPIRATION RATE: 16 BRPM | TEMPERATURE: 100.5 F | SYSTOLIC BLOOD PRESSURE: 145 MMHG

## 2019-08-09 DIAGNOSIS — L03.115 CELLULITIS OF RIGHT LOWER EXTREMITY: Primary | ICD-10-CM

## 2019-08-09 DIAGNOSIS — L03.115 CELLULITIS AND ABSCESS OF RIGHT LEG: ICD-10-CM

## 2019-08-09 DIAGNOSIS — I48.0 PAF (PAROXYSMAL ATRIAL FIBRILLATION) (HCC): ICD-10-CM

## 2019-08-09 DIAGNOSIS — L02.415 CELLULITIS AND ABSCESS OF RIGHT LEG: ICD-10-CM

## 2019-08-09 DIAGNOSIS — A41.9 SEPSIS, DUE TO UNSPECIFIED ORGANISM: ICD-10-CM

## 2019-08-09 DIAGNOSIS — L97.412 DIABETIC ULCER OF RIGHT MIDFOOT ASSOCIATED WITH TYPE 2 DIABETES MELLITUS, WITH FAT LAYER EXPOSED (HCC): ICD-10-CM

## 2019-08-09 DIAGNOSIS — E11.621 DIABETIC ULCER OF RIGHT MIDFOOT ASSOCIATED WITH TYPE 2 DIABETES MELLITUS, WITH FAT LAYER EXPOSED (HCC): ICD-10-CM

## 2019-08-09 LAB
ALBUMIN SERPL-MCNC: 3.2 G/DL (ref 3.5–5)
ALBUMIN/GLOB SERPL: 0.8 {RATIO} (ref 1.1–2.2)
ALP SERPL-CCNC: 103 U/L (ref 45–117)
ALT SERPL-CCNC: 14 U/L (ref 12–78)
ANION GAP SERPL CALC-SCNC: 8 MMOL/L (ref 5–15)
AST SERPL-CCNC: 11 U/L (ref 15–37)
BASOPHILS # BLD: 0 K/UL (ref 0–0.1)
BASOPHILS NFR BLD: 0 % (ref 0–1)
BILIRUB SERPL-MCNC: 0.6 MG/DL (ref 0.2–1)
BUN SERPL-MCNC: 27 MG/DL (ref 6–20)
BUN/CREAT SERPL: 13 (ref 12–20)
CALCIUM SERPL-MCNC: 8.7 MG/DL (ref 8.5–10.1)
CHLORIDE SERPL-SCNC: 107 MMOL/L (ref 97–108)
CO2 SERPL-SCNC: 22 MMOL/L (ref 21–32)
CREAT SERPL-MCNC: 2.01 MG/DL (ref 0.55–1.02)
DIFFERENTIAL METHOD BLD: ABNORMAL
EOSINOPHIL # BLD: 0 K/UL (ref 0–0.4)
EOSINOPHIL NFR BLD: 0 % (ref 0–7)
ERYTHROCYTE [DISTWIDTH] IN BLOOD BY AUTOMATED COUNT: 13.8 % (ref 11.5–14.5)
ERYTHROCYTE [SEDIMENTATION RATE] IN BLOOD: 51 MM/HR (ref 0–30)
GLOBULIN SER CALC-MCNC: 4.2 G/DL (ref 2–4)
GLUCOSE BLD STRIP.AUTO-MCNC: 163 MG/DL (ref 65–100)
GLUCOSE BLD STRIP.AUTO-MCNC: 172 MG/DL (ref 65–100)
GLUCOSE BLD STRIP.AUTO-MCNC: 206 MG/DL (ref 65–100)
GLUCOSE SERPL-MCNC: 218 MG/DL (ref 65–100)
HCT VFR BLD AUTO: 34.4 % (ref 35–47)
HGB BLD-MCNC: 10.5 G/DL (ref 11.5–16)
IMM GRANULOCYTES # BLD AUTO: 0.1 K/UL (ref 0–0.04)
IMM GRANULOCYTES NFR BLD AUTO: 1 % (ref 0–0.5)
INR PPP: 1.8 (ref 0.9–1.1)
LACTATE BLD-SCNC: 0.82 MMOL/L (ref 0.4–2)
LACTATE BLD-SCNC: 2.15 MMOL/L (ref 0.4–2)
LACTATE SERPL-SCNC: 2 MMOL/L (ref 0.4–2)
LYMPHOCYTES # BLD: 0.5 K/UL (ref 0.8–3.5)
LYMPHOCYTES NFR BLD: 4 % (ref 12–49)
MCH RBC QN AUTO: 30 PG (ref 26–34)
MCHC RBC AUTO-ENTMCNC: 30.5 G/DL (ref 30–36.5)
MCV RBC AUTO: 98.3 FL (ref 80–99)
MONOCYTES # BLD: 0.5 K/UL (ref 0–1)
MONOCYTES NFR BLD: 4 % (ref 5–13)
NEUTS SEG # BLD: 12.4 K/UL (ref 1.8–8)
NEUTS SEG NFR BLD: 91 % (ref 32–75)
NRBC # BLD: 0 K/UL (ref 0–0.01)
NRBC BLD-RTO: 0 PER 100 WBC
PLATELET # BLD AUTO: 278 K/UL (ref 150–400)
PMV BLD AUTO: 10.1 FL (ref 8.9–12.9)
POTASSIUM SERPL-SCNC: 4.2 MMOL/L (ref 3.5–5.1)
PROT SERPL-MCNC: 7.4 G/DL (ref 6.4–8.2)
PROTHROMBIN TIME: 17.9 SEC (ref 9–11.1)
RBC # BLD AUTO: 3.5 M/UL (ref 3.8–5.2)
RBC MORPH BLD: ABNORMAL
SERVICE CMNT-IMP: ABNORMAL
SODIUM SERPL-SCNC: 137 MMOL/L (ref 136–145)
WBC # BLD AUTO: 13.5 K/UL (ref 3.6–11)

## 2019-08-09 PROCEDURE — 96360 HYDRATION IV INFUSION INIT: CPT

## 2019-08-09 PROCEDURE — 87040 BLOOD CULTURE FOR BACTERIA: CPT

## 2019-08-09 PROCEDURE — 74011250637 HC RX REV CODE- 250/637: Performed by: HOSPITALIST

## 2019-08-09 PROCEDURE — 71045 X-RAY EXAM CHEST 1 VIEW: CPT

## 2019-08-09 PROCEDURE — 99284 EMERGENCY DEPT VISIT MOD MDM: CPT

## 2019-08-09 PROCEDURE — 83605 ASSAY OF LACTIC ACID: CPT

## 2019-08-09 PROCEDURE — 94761 N-INVAS EAR/PLS OXIMETRY MLT: CPT

## 2019-08-09 PROCEDURE — 82962 GLUCOSE BLOOD TEST: CPT

## 2019-08-09 PROCEDURE — 93971 EXTREMITY STUDY: CPT

## 2019-08-09 PROCEDURE — 80053 COMPREHEN METABOLIC PANEL: CPT

## 2019-08-09 PROCEDURE — 74011636637 HC RX REV CODE- 636/637: Performed by: HOSPITALIST

## 2019-08-09 PROCEDURE — 65660000000 HC RM CCU STEPDOWN

## 2019-08-09 PROCEDURE — 93005 ELECTROCARDIOGRAM TRACING: CPT

## 2019-08-09 PROCEDURE — 74011250636 HC RX REV CODE- 250/636: Performed by: PHYSICIAN ASSISTANT

## 2019-08-09 PROCEDURE — 99214 OFFICE O/P EST MOD 30 MIN: CPT

## 2019-08-09 PROCEDURE — 74011000258 HC RX REV CODE- 258: Performed by: PHYSICIAN ASSISTANT

## 2019-08-09 PROCEDURE — 73590 X-RAY EXAM OF LOWER LEG: CPT

## 2019-08-09 PROCEDURE — 74011000250 HC RX REV CODE- 250: Performed by: PHYSICIAN ASSISTANT

## 2019-08-09 PROCEDURE — 74011250637 HC RX REV CODE- 250/637: Performed by: PHYSICIAN ASSISTANT

## 2019-08-09 PROCEDURE — 85025 COMPLETE CBC W/AUTO DIFF WBC: CPT

## 2019-08-09 PROCEDURE — 85610 PROTHROMBIN TIME: CPT

## 2019-08-09 PROCEDURE — 85652 RBC SED RATE AUTOMATED: CPT

## 2019-08-09 PROCEDURE — 36415 COLL VENOUS BLD VENIPUNCTURE: CPT

## 2019-08-09 RX ORDER — LANOLIN ALCOHOL/MO/W.PET/CERES
1000 CREAM (GRAM) TOPICAL DAILY
Status: DISCONTINUED | OUTPATIENT
Start: 2019-08-10 | End: 2019-08-12 | Stop reason: HOSPADM

## 2019-08-09 RX ORDER — VANCOMYCIN 2 GRAM/500 ML IN 0.9 % SODIUM CHLORIDE INTRAVENOUS
2000
Status: COMPLETED | OUTPATIENT
Start: 2019-08-09 | End: 2019-08-09

## 2019-08-09 RX ORDER — ACETAMINOPHEN 325 MG/1
650 TABLET ORAL
Status: DISCONTINUED | OUTPATIENT
Start: 2019-08-09 | End: 2019-08-12 | Stop reason: HOSPADM

## 2019-08-09 RX ORDER — MAGNESIUM SULFATE 100 %
4 CRYSTALS MISCELLANEOUS AS NEEDED
Status: DISCONTINUED | OUTPATIENT
Start: 2019-08-09 | End: 2019-08-12 | Stop reason: HOSPADM

## 2019-08-09 RX ORDER — INSULIN LISPRO 100 [IU]/ML
INJECTION, SOLUTION INTRAVENOUS; SUBCUTANEOUS
Status: DISCONTINUED | OUTPATIENT
Start: 2019-08-09 | End: 2019-08-12 | Stop reason: HOSPADM

## 2019-08-09 RX ORDER — SODIUM CHLORIDE 0.9 % (FLUSH) 0.9 %
5-40 SYRINGE (ML) INJECTION AS NEEDED
Status: DISCONTINUED | OUTPATIENT
Start: 2019-08-09 | End: 2019-08-12 | Stop reason: HOSPADM

## 2019-08-09 RX ORDER — VANCOMYCIN HYDROCHLORIDE
1250 EVERY 24 HOURS
Status: DISCONTINUED | OUTPATIENT
Start: 2019-08-10 | End: 2019-08-11

## 2019-08-09 RX ORDER — DEXTROSE MONOHYDRATE 100 MG/ML
125-250 INJECTION, SOLUTION INTRAVENOUS AS NEEDED
Status: DISCONTINUED | OUTPATIENT
Start: 2019-08-09 | End: 2019-08-12 | Stop reason: HOSPADM

## 2019-08-09 RX ORDER — WARFARIN 1 MG/1
4 TABLET ORAL ONCE
Status: COMPLETED | OUTPATIENT
Start: 2019-08-09 | End: 2019-08-09

## 2019-08-09 RX ORDER — SODIUM CHLORIDE 0.9 % (FLUSH) 0.9 %
5-40 SYRINGE (ML) INJECTION EVERY 8 HOURS
Status: DISCONTINUED | OUTPATIENT
Start: 2019-08-09 | End: 2019-08-12 | Stop reason: HOSPADM

## 2019-08-09 RX ORDER — ACETAMINOPHEN 500 MG
1000 TABLET ORAL
Status: COMPLETED | OUTPATIENT
Start: 2019-08-09 | End: 2019-08-09

## 2019-08-09 RX ORDER — ONDANSETRON 2 MG/ML
4 INJECTION INTRAMUSCULAR; INTRAVENOUS
Status: DISCONTINUED | OUTPATIENT
Start: 2019-08-09 | End: 2019-08-12 | Stop reason: HOSPADM

## 2019-08-09 RX ORDER — DOXAZOSIN 2 MG/1
4 TABLET ORAL
Status: DISCONTINUED | OUTPATIENT
Start: 2019-08-09 | End: 2019-08-12 | Stop reason: HOSPADM

## 2019-08-09 RX ORDER — WARFARIN 4 MG/1
2 TABLET ORAL
COMMUNITY
End: 2019-08-15 | Stop reason: DRUGHIGH

## 2019-08-09 RX ORDER — CLONIDINE HYDROCHLORIDE 0.1 MG/1
0.6 TABLET ORAL
Status: DISCONTINUED | OUTPATIENT
Start: 2019-08-09 | End: 2019-08-12 | Stop reason: HOSPADM

## 2019-08-09 RX ORDER — CLONIDINE HYDROCHLORIDE 0.3 MG/1
0.6 TABLET ORAL
COMMUNITY
End: 2020-03-22

## 2019-08-09 RX ORDER — WARFARIN 4 MG/1
4 TABLET ORAL
COMMUNITY
End: 2019-08-15 | Stop reason: DRUGHIGH

## 2019-08-09 RX ORDER — BUSPIRONE HYDROCHLORIDE 5 MG/1
10 TABLET ORAL 2 TIMES DAILY
Status: DISCONTINUED | OUTPATIENT
Start: 2019-08-09 | End: 2019-08-12 | Stop reason: HOSPADM

## 2019-08-09 RX ORDER — BUMETANIDE 1 MG/1
2 TABLET ORAL DAILY
Status: DISCONTINUED | OUTPATIENT
Start: 2019-08-10 | End: 2019-08-12 | Stop reason: HOSPADM

## 2019-08-09 RX ORDER — INSULIN GLARGINE 100 [IU]/ML
20 INJECTION, SOLUTION SUBCUTANEOUS DAILY
Status: DISCONTINUED | OUTPATIENT
Start: 2019-08-10 | End: 2019-08-12 | Stop reason: HOSPADM

## 2019-08-09 RX ORDER — BUMETANIDE 0.25 MG/ML
1 INJECTION INTRAMUSCULAR; INTRAVENOUS
Status: COMPLETED | OUTPATIENT
Start: 2019-08-09 | End: 2019-08-09

## 2019-08-09 RX ORDER — BUMETANIDE 1 MG/1
2 TABLET ORAL DAILY
Status: ON HOLD | COMMUNITY
End: 2020-03-22 | Stop reason: SDUPTHER

## 2019-08-09 RX ORDER — POTASSIUM CHLORIDE 20 MEQ/1
20 TABLET, EXTENDED RELEASE ORAL
Status: COMPLETED | OUTPATIENT
Start: 2019-08-09 | End: 2019-08-09

## 2019-08-09 RX ORDER — POTASSIUM CHLORIDE 750 MG/1
10 TABLET, FILM COATED, EXTENDED RELEASE ORAL DAILY
Status: DISCONTINUED | OUTPATIENT
Start: 2019-08-10 | End: 2019-08-12 | Stop reason: HOSPADM

## 2019-08-09 RX ORDER — HYDRALAZINE HYDROCHLORIDE 50 MG/1
100 TABLET, FILM COATED ORAL EVERY 8 HOURS
Status: DISCONTINUED | OUTPATIENT
Start: 2019-08-09 | End: 2019-08-12 | Stop reason: HOSPADM

## 2019-08-09 RX ORDER — MELATONIN
5000 DAILY
Status: DISCONTINUED | OUTPATIENT
Start: 2019-08-10 | End: 2019-08-12 | Stop reason: HOSPADM

## 2019-08-09 RX ORDER — METOPROLOL SUCCINATE 50 MG/1
100 TABLET, EXTENDED RELEASE ORAL DAILY
Status: DISCONTINUED | OUTPATIENT
Start: 2019-08-10 | End: 2019-08-12 | Stop reason: HOSPADM

## 2019-08-09 RX ADMIN — CLONIDINE HYDROCHLORIDE 0.6 MG: 0.1 TABLET ORAL at 21:41

## 2019-08-09 RX ADMIN — BUMETANIDE 1 MG: 0.25 INJECTION INTRAMUSCULAR; INTRAVENOUS at 13:44

## 2019-08-09 RX ADMIN — HYDRALAZINE HYDROCHLORIDE 100 MG: 50 TABLET, FILM COATED ORAL at 21:41

## 2019-08-09 RX ADMIN — DOXAZOSIN 4 MG: 2 TABLET ORAL at 21:41

## 2019-08-09 RX ADMIN — CEFEPIME HYDROCHLORIDE 2 G: 2 INJECTION, POWDER, FOR SOLUTION INTRAVENOUS at 13:47

## 2019-08-09 RX ADMIN — WARFARIN SODIUM 4 MG: 1 TABLET ORAL at 18:19

## 2019-08-09 RX ADMIN — BUSPIRONE HYDROCHLORIDE 10 MG: 5 TABLET ORAL at 18:19

## 2019-08-09 RX ADMIN — POTASSIUM CHLORIDE 20 MEQ: 20 TABLET, EXTENDED RELEASE ORAL at 13:53

## 2019-08-09 RX ADMIN — ACETAMINOPHEN 1000 MG: 500 TABLET ORAL at 10:50

## 2019-08-09 RX ADMIN — Medication 10 ML: at 18:21

## 2019-08-09 RX ADMIN — Medication 10 ML: at 21:41

## 2019-08-09 RX ADMIN — VANCOMYCIN HYDROCHLORIDE 2000 MG: 10 INJECTION, POWDER, LYOPHILIZED, FOR SOLUTION INTRAVENOUS at 15:19

## 2019-08-09 RX ADMIN — Medication 10 ML: at 13:55

## 2019-08-09 RX ADMIN — SODIUM CHLORIDE 500 ML: 900 INJECTION, SOLUTION INTRAVENOUS at 10:47

## 2019-08-09 RX ADMIN — INSULIN LISPRO 3 UNITS: 100 INJECTION, SOLUTION INTRAVENOUS; SUBCUTANEOUS at 18:20

## 2019-08-09 RX ADMIN — HYDRALAZINE HYDROCHLORIDE 100 MG: 50 TABLET, FILM COATED ORAL at 18:19

## 2019-08-09 NOTE — PROGRESS NOTES
Pharmacy Daily Dosing of Warfarin Indication: afib Goal INR: 2-3 PTA Warfarin Dose: 4 mg on Mon, Thurs; 2 mg daily on the other days Concurrent anticoagulants:  
 
Concurrent antiplatelet:  
 
Major Interacting Medications ? Drugs that may increase INR:  
? Drugs that may decrease INR:  
 
Conditions that may increase/decrease INR (CHF, C. diff, cirrhosis, thyroid disorder, hypoalbuminemia):  
 
Labs: 
Recent Labs 08/09/19 
1606 08/09/19 
1022 INR 1.8*  --   
HGB  --  10.5* PLT  --  278 SGOT  --  11* TBILI  --  0.6 ALB  --  3.2* Impression/Plan: · Will order warfarin 4 mg PO x 1 dose today · Daily INR · CBC w/o diff QOD Pharmacy will follow daily and adjust the dose as appropriate. Thanks Angel Erickson, PHARMD

## 2019-08-09 NOTE — H&P
Hospitalist Admission Note NAME: Mile Burrows :  1960 MRN:  [id number] Date/Time:  2019 4:57 PM 
 
Patient PCP: Giuliano Asif MD  
Cardiology:  Dr. Kunal Montoya 
______________________________________________________________________ Assessment & Plan: 
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 
 
--continue cefepime and vancomycin. Doppler US right leg negative DVT 
--consult wound care --continue coumadin INR 1.8, pharmacy to dose 
--continue home meds Body mass index is 38.42 kg/m². Code: discussed, full DVT prophylaxis: coumadin Surrogate decision maker:   Subjective: CHIEF COMPLAINT:  Fever, chills, right leg redness and swelling HISTORY OF PRESENT ILLNESS:    
Mile Burrows is a 60 y.o.  female sent from wound care center for right lower leg cellulitis and rule out DVT. Patient is DM and has chronic b/l foot ulcerations and sometimes ulceration on breasts and followed by wound care center. She had routine visit and reported not feeling well for few days with low grade temp and redness in right lower leg. We were asked to admit for work up and evaluation of the above problems. Past Medical History:  
Diagnosis Date  Acquired lymphedema Right arm  Arrhythmia  Asthma  Atrial fibrillation ([facility] Dr. Zakiya Waldron and Dr. Fiona Jeronimo [facility])  Cancer ([facility] bilateral breast  
 Chronic kidney disease  Diabetes mellitus type II   
 Dizziness  Essential hypertension  Hyperlipidemia  Hypertension  Long term (current) use of anticoagulants  Morbid obesity ([facility] 3/14/2012  Osteoarthritis  Pacemaker  Sick sinus syndrome ([facility] Past Surgical History:  
Procedure Laterality Date  ABDOMEN SURGERY PROC UNLISTED    
 gastric bypass  GASTRIC BYPASS,OBESE<150CM SAW-EN-Y  7/08  
 hutcher  HX AFIB ABLATION    
 HX CARPAL TUNNEL RELEASE [address] [facility] [address] [place] [address] RIGHT MODIFIED RADICAL   
 HX MOHS PROCEDURES  1995  
 right  HX ORTHOPAEDIC    
 ight knee surgery  HX PACEMAKER    
 IR INSERT TUNL CVC W PORT OVER 5 YEARS    
 LAMINECTOMY,CERVICAL  1994  
 NEEDLE BIOPSY LIVER,W OTHR [address]  8.21.07  
 ND [address] AND 1994  ND TRABECULOPLASTY BY LASER SURGERY    
 US GUIDE ASP OVARIAN CYST ABD APPROACH  8.21.07  
 RIGHT Social History Tobacco Use  Smoking status: Never Smoker  Smokeless tobacco: Never Used Substance Use Topics  Alcohol use: Yes Comment: rare Family History Problem Relation Age of Onset  Cancer Mother  Heart Disease Mother  Alcohol abuse Father  Diabetes Brother  Hypertension Brother Allergies Allergen Reactions  Ace Inhibitors Cough  Amlodipine Swelling  Avapro [Irbesartan] Unable to Obtain  Bactrim [Sulfamethoxazole-Trimethoprim] Other (comments) Sore mouth  Captopril Cough  Carvedilol Diarrhea  Contrast Agent [Iodine] Itching [address]  Morphine Nausea and Vomiting and Swelling  Penicillins Hives  Pravachol [Pravastatin] Nausea Only  Seafood [Shellfish Containing Products] Hives  Singulair [Montelukast] Other (comments)  
  palpitations Prior to Admission medications Medication Sig Start Date End Date Taking? Authorizing Provider  
bumetanide (BUMEX) 1 mg tablet Take 2 mg by mouth daily. Yes Provider, Historical  
cloNIDine HCl (CATAPRES) 0.3 mg tablet Take 0.6 mg by mouth nightly.    Yes Provider, Historical  
 warfarin (COUMADIN) 4 mg tablet Take 4 mg by mouth every Monday and Thursday. Pt takes 4 mg Mon and Thurs and 2 mg the other 5 days a week   Yes Provider, Historical  
warfarin (COUMADIN) 4 mg tablet Take 2 mg by mouth five (5) days a week. Pt takes 4 mg Mon and Thurs and 2 mg the other 5 days a week   Yes Provider, Historical  
metoprolol succinate (TOPROL-XL) 100 mg tablet TAKE TWO TABLETS BY MOUTH DAILY 7/12/19  Yes Bola Andersen MD  
hydrALAZINE (APRESOLINE) 100 mg tablet TAKE ONE TABLET BY MOUTH THREE TIMES A DAY 6/24/19  Yes Monika Simpson NP  
doxazosin (CARDURA) 4 mg tablet TAKE ONE TABLET BY MOUTH ONCE NIGHTLY 6/14/19  Yes Bola Andersen MD  
busPIRone (BUSPAR) 10 mg tablet TAKE ONE TABLET BY MOUTH TWICE A DAY 5/24/19  Yes Bola Andersen MD  
potassium chloride SR (KLOR-CON 10) 10 mEq tablet TAKE ONE TABLET BY MOUTH DAILY 4/22/19  Yes Mukesh Ortiz MD  
sertraline (ZOLOFT) 50 mg tablet TAKE ONE AND ONE-HALF (1 & 1/2) TABLET BY MOUTH DAILY 4/17/19  Yes Bola Andersen MD  
insulin glargine (LANTUS U-100 INSULIN) 100 unit/mL injection 20 Units by SubCUTAneous route daily. 11/13/18  Yes Bola Andersen MD  
metolazone (ZAROXOLYN) 5 mg tablet Take 1 Tab by mouth daily as needed (take if develop increased LE edema, weight gain > 5 pounds. ). 4/10/14  Yes Lucas Potts NP  
cholecalciferol, VITAMIN D3, (VITAMIN D3) 5,000 unit tab tablet Take 5,000 Units by mouth daily. Yes Provider, Historical  
vitamin A 8,000 unit capsule Take 8,000 Units by mouth daily. Yes Provider, Historical  
ACETAMINOPHEN/DIPHENHYDRAMINE (TYLENOL PM PO) Take 3 Tabs by mouth nightly as needed. Yes Provider, Historical  
insulin lispro (HUMALOG) 100 unit/mL kwikpen 2 units with dinner for sugars over 200 1/3/14  Yes Bola Andersen MD  
cyanocobalamin (VITAMIN B-12) 1,000 mcg sublingual tablet Take 1,000 mcg by mouth daily.    Yes Provider, Historical  
 
 REVIEW OF SYSTEMS:  POSITIVE= Bold. Negative = normal text General:  fever, chills, sweats, generalized weakness, weight loss/gain, loss of appetite Eyes:  blurred vision, eye pain, loss of vision, diplopia Ear Nose and Throat:  rhinorrhea, pharyngitis Respiratory:   cough, sputum production, SOB, wheezing, CHOW, pleuritic pain 
Cardiology:  chest pain, palpitations, orthopnea, PND, edema, syncope Gastrointestinal:  abdominal pain, N/V, dysphagia, diarrhea, constipation, bleeding Genitourinary:  frequency, urgency, dysuria, hematuria, incontinence Muskuloskeletal :  arthralgia, myalgia Hematology:  easy bruising, bleeding, lymphadenopathy Dermatological:  rash, ulceration, pruritis Endocrine:  hot flashes or polydipsia Neurological:  headache, dizziness, confusion, focal weakness, paresthesia, memory loss, gait disturbance Psychological: anxiety, depression, agitation Objective: VITALS:   
Visit Vitals /67 Pulse 65 Temp (!) 100.6 °F (38.1 °C) Resp 18 Ht 5' 9\" (1.753 m) Wt 118 kg (260 lb 2.3 oz) SpO2 96% BMI 38.42 kg/m² Temp (24hrs), Av.5 °F (38.1 °C), Min:100.3 °F (37.9 °C), Max:100.6 °F (38.1 °C) Body mass index is 38.42 kg/m². PHYSICAL EXAM: 
 
General:    Alert, cooperative, no distress, appears stated age. HEENT: Atraumatic, anicteric sclerae, pink conjunctivae No oral ulcers, mucosa moist, throat clear. Hearing intact. Neck:  Supple, symmetrical,  thyroid: non tender Lungs:   Clear to auscultation bilaterally. No Wheezing or Rhonchi. No rales. Chest wall:  No tenderness  No Accessory muscle use. Heart:   Regular  rhythm,  No  murmur   No gallop. No edema. Abdomen:   Soft, non-tender. Not distended. Bowel sounds normal. No masses Extremities: No cyanosis. No clubbing Skin:     Not pale Not Jaundiced   Right lower leg and right foot red, hot, swollen compared to left. RIGHT FOOT         LEFT FOOT Psych:  Good insight. Not depressed. Not anxious or agitated. Neurologic: EOMs intact. No facial asymmetry. No aphasia or slurred speech. Symmetrical strength, Alert and oriented X 3. Peripheral pulse: Bilateral, DP, 2+ Capillary refill:  normal 
 
IMAGING RESULTS: 
 []       I have personally reviewed the actual   []     CXR  []     CT scan CXR: 
CT : 
EKG: 
 ________________________________________________________________________ Care Plan discussed with: 
  Comments Patient x Family RN Care Manager Consultant:     
________________________________________________________________________ Prophylaxis: 
GI none DVT coumadin  
________________________________________________________________________ Recommended Disposition:  
Home with Family y HH/PT/OT/RN y  
SNF/LTC   
RENE   
________________________________________________________________________ Code Status: 
Full Code y  
DNR/DNI   
________________________________________________________________________ TOTAL TIME:  60 minutes 
______________________________________________________________________ Chiquita Alba MD 
 
 
Procedures: see electronic medical records for all procedures/Xrays and details which were not copied into this note but were reviewed prior to creation of Plan. LAB DATA REVIEWED:   
Recent Results (from the past 24 hour(s)) CBC WITH AUTOMATED DIFF Collection Time: 08/09/19 10:22 AM  
Result Value Ref Range WBC 13.5 (H) 3.6 - 11.0 K/uL  
 RBC 3.50 (L) 3.80 - 5.20 M/uL  
 HGB 10.5 (L) 11.5 - 16.0 g/dL HCT 34.4 (L) 35.0 - 47.0 % MCV 98.3 80.0 - 99.0 FL  
 MCH 30.0 26.0 - 34.0 PG  
 MCHC 30.5 30.0 - 36.5 g/dL  
 RDW 13.8 11.5 - 14.5 % PLATELET 788 326 - 596 K/uL MPV 10.1 8.9 - 12.9 FL  
 NRBC 0.0 0  WBC ABSOLUTE NRBC 0.00 0.00 - 0.01 K/uL NEUTROPHILS 91 (H) 32 - 75 % LYMPHOCYTES 4 (L) 12 - 49 % MONOCYTES 4 (L) 5 - 13 % EOSINOPHILS 0 0 - 7 % BASOPHILS 0 0 - 1 % IMMATURE GRANULOCYTES 1 (H) 0.0 - 0.5 % ABS. NEUTROPHILS 12.4 (H) 1.8 - 8.0 K/UL  
 ABS. LYMPHOCYTES 0.5 (L) 0.8 - 3.5 K/UL  
 ABS. MONOCYTES 0.5 0.0 - 1.0 K/UL  
 ABS. EOSINOPHILS 0.0 0.0 - 0.4 K/UL  
 ABS. BASOPHILS 0.0 0.0 - 0.1 K/UL  
 ABS. IMM. GRANS. 0.1 (H) 0.00 - 0.04 K/UL  
 DF AUTOMATED    
 RBC COMMENTS NORMOCYTIC, NORMOCHROMIC METABOLIC PANEL, COMPREHENSIVE Collection Time: 08/09/19 10:22 AM  
Result Value Ref Range Sodium 137 136 - 145 mmol/L Potassium 4.2 3.5 - 5.1 mmol/L Chloride 107 97 - 108 mmol/L  
 CO2 22 21 - 32 mmol/L Anion gap 8 5 - 15 mmol/L Glucose 218 (H) 65 - 100 mg/dL BUN 27 (H) 6 - 20 MG/DL Creatinine 2.01 (H) 0.55 - 1.02 MG/DL  
 BUN/Creatinine ratio 13 12 - 20 GFR est AA 31 (L) >60 ml/min/1.73m2 GFR est non-AA 25 (L) >60 ml/min/1.73m2 Calcium 8.7 8.5 - 10.1 MG/DL Bilirubin, total 0.6 0.2 - 1.0 MG/DL  
 ALT (SGPT) 14 12 - 78 U/L  
 AST (SGOT) 11 (L) 15 - 37 U/L Alk. phosphatase 103 45 - 117 U/L Protein, total 7.4 6.4 - 8.2 g/dL Albumin 3.2 (L) 3.5 - 5.0 g/dL Globulin 4.2 (H) 2.0 - 4.0 g/dL A-G Ratio 0.8 (L) 1.1 - 2.2 LACTIC ACID Collection Time: 08/09/19 10:22 AM  
Result Value Ref Range Lactic acid 2.0 0.4 - 2.0 MMOL/L  
SED RATE (ESR) Collection Time: 08/09/19 10:22 AM  
Result Value Ref Range Sed rate, automated 51 (H) 0 - 30 mm/hr POC LACTIC ACID Collection Time: 08/09/19 11:53 AM  
Result Value Ref Range Lactic Acid (POC) 2.15 (HH) 0.40 - 2.00 mmol/L  
EKG, 12 LEAD, INITIAL Collection Time: 08/09/19  3:33 PM  
Result Value Ref Range Ventricular Rate 65 BPM  
 Atrial Rate 65 BPM  
 P-R Interval 130 ms QRS Duration 114 ms Q-T Interval 456 ms  
 QTC Calculation (Bezet) 474 ms Calculated R Axis -31 degrees Calculated T Axis 103 degrees Diagnosis Normal sinus rhythm Left axis deviation Septal infarct (cited on or before 09-APR-2014) T wave abnormality, consider lateral ischemia When compared with ECG of 09-APR-2014 09:43, 
T wave inversion more evident in Lateral leads QT has shortened GLUCOSE, POC Collection Time: 08/09/19  3:53 PM  
Result Value Ref Range Glucose (POC) 163 (H) 65 - 100 mg/dL Performed by [facility] PROTHROMBIN TIME + INR Collection Time: 08/09/19  4:06 PM  
Result Value Ref Range INR 1.8 (H) 0.9 - 1.1 Prothrombin time 17.9 (H) 9.0 - 11.1 sec POC LACTIC ACID Collection Time: 08/09/19  4:15 PM  
Result Value Ref Range  Lactic Acid (POC) 0.82 0.40 - 2.00 mmol/L

## 2019-08-09 NOTE — ED PROVIDER NOTES
EMERGENCY DEPARTMENT HISTORY AND PHYSICAL EXAM 
 
 
Date: 8/9/2019 Patient Name: Helen Galan History of Presenting Illness HPI: Helen Galan is a 60 y.o. female with past medical history of hypertension, hyperlipidemia, diabetes, morbid obesity, atrial fibrillation on warfarin, sick sinus syndrome, pacemaker, hypertension, bilateral breast cancer, right arm lymphedema, CKD, chronic wounds to bilateral feet/legs and left breast presents to the emergency room for cellulitis of right lower leg. Patient was seen by Dr. Samantha Platt who referred her to the emergency room for fever, cellulitis, DVT rule out of right leg. See his note for further details. Patient reports she was on antibiotics a few weeks ago for this and reports that the cellulitis improved but did not subside. She reports she has had a fever for a few days. She denies taking fever reducer before coming to the emergency room. Patient denies chest pain/shortness of breath, syncope, abdominal pain, any pain in her leg at rest.  She reports she has significant pain with palpation but otherwise she is in no pain. Pertinent social history: Non-smoker Pertinent surgical history: Gastric bypass, bilateral mastectomy, ablation for A. fib, pacemaker PCP: David Yap MD 
 
Current Facility-Administered Medications Medication Dose Route Frequency Provider Last Rate Last Dose  sodium chloride (NS) flush 5-40 mL  5-40 mL IntraVENous Q8H Florina Hanks PA   10 mL at 08/09/19 1355  sodium chloride (NS) flush 5-40 mL  5-40 mL IntraVENous PRN LORNA Klein      
 acetaminophen (TYLENOL) tablet 650 mg  650 mg Oral Q6H PRN LORNA Klein      
 [START ON 8/10/2019] cefepime (MAXIPIME) 2 g in 0.9% sodium chloride (MBP/ADV) 100 mL  2 g IntraVENous Q12H Keren Griffith MD      
 [START ON 8/10/2019] vancomycin (VANCOCIN) 1250 mg in  ml infusion  1,250 mg IntraVENous Q24H Keren Griffith MD      
  sodium chloride (NS) flush 5-40 mL  5-40 mL IntraVENous Q8H Too Calhoun MD   10 mL at 08/09/19 1821  
 sodium chloride (NS) flush 5-40 mL  5-40 mL IntraVENous PRN Too Calhoun MD      
 ondansetron [facility]) injection 4 mg  4 mg IntraVENous Q6H PRN Too Calhoun MD      
 busPIRone (BUSPAR) tablet 10 mg  10 mg Oral BID Too Calhoun MD   10 mg at 08/09/19 1819  cloNIDine HCl (CATAPRES) tablet 0.6 mg  0.6 mg Oral QHS Too Calhoun MD      
 [START ON 8/10/2019] cyanocobalamin (VITAMIN B12) tablet 1,000 mcg  1,000 mcg Oral DAILY Too Calhoun MD      
 hydrALAZINE (APRESOLINE) tablet 100 mg  100 mg Oral Q8H Too Calhoun MD   100 mg at 08/09/19 1819  
 [START ON 8/10/2019] insulin glargine (LANTUS) injection 20 Units  20 Units SubCUTAneous DAILY Too Calhoun MD      
 [START ON 8/10/2019] metoprolol succinate (TOPROL-XL) XL tablet 100 mg  100 mg Oral DAILY Too Calhoun MD      
 [START ON 8/10/2019] sertraline (ZOLOFT) tablet 75 mg  75 mg Oral DAILY Too Calhoun MD      
 doxazosin (CARDURA) tablet 4 mg  4 mg Oral QHS Too Calhoun MD      
 [START ON 8/10/2019] cholecalciferol (VITAMIN D3) tablet 5,000 Units  5,000 Units Oral DAILY Too Calhoun MD      
 insulin lispro (HUMALOG) injection   SubCUTAneous AC&HS Too Calhoun MD   3 Units at 08/09/19 1820  
 glucose chewable tablet 16 g  4 Tab Oral PRN Too Calhoun MD      
 glucagon [facility]) injection 1 mg  1 mg IntraMUSCular PRN Too Calhoun MD      
 dextrose 10% infusion 125-250 mL  125-250 mL IntraVENous PRN Too Calhoun MD      
 WARFARIN INFORMATION NOTE (COUMADIN)   Other QPM Too Calhoun MD      
 [START ON 8/10/2019] bumetanide (BUMEX) tablet 2 mg  2 mg Oral DAILY Too Calhoun MD      
 [START ON 8/10/2019] potassium chloride SR (KLOR-CON 10) tablet 10 mEq  10 mEq Oral DAILY Too Calhoun MD      
 
 
Past History Past Medical History: 
Past Medical History:  
Diagnosis Date  Acquired lymphedema Right arm  Arrhythmia  Asthma  Atrial fibrillation ([facility] Dr. Holger Barrera and Dr. Marco A alves [facility])  Cancer ([facility] bilateral breast  
 Chronic kidney disease  Diabetes mellitus type II   
 Dizziness  Essential hypertension  Hyperlipidemia  Hypertension  Long term (current) use of anticoagulants  Morbid obesity ([facility] 3/14/2012  Osteoarthritis  Pacemaker  [facility] Past Surgical History: 
Past Surgical History:  
Procedure Laterality Date  ABDOMEN SURGERY PROC UNLISTED    
 gastric bypass  GASTRIC BYPASS,OBESE<150CM SAW-EN-Y  7/08  
 hutcher  HX AFIB ABLATION    
 HX CARPAL TUNNEL RELEASE [id number] [facility] [address] [place] [address] RIGHT MODIFIED RADICAL   
 HX MOHS PROCEDURES  1995  
 right  HX ORTHOPAEDIC    
 ight knee surgery  HX PACEMAKER    
 IR INSERT TUNL CVC W PORT OVER 5 YEARS    
 LAMINECTOMY,CERVICAL  1994  
 NEEDLE BIOPSY LIVER,W OTHR [address]  8.21.07  
 [place] [address] AND 1994  [place] TRABECULOPLASTY BY LASER SURGERY    
 US GUIDE ASP OVARIAN CYST ABD APPROACH  8.21.07  
 RIGHT Family History: 
Family History Problem Relation Age of Onset  Cancer Mother  Heart Disease Mother  Alcohol abuse Father  Diabetes Brother  Hypertension Brother Social History: 
Social History Tobacco Use  Smoking status: Never Smoker  Smokeless tobacco: Never Used Substance Use Topics  Alcohol use: Yes Comment: rare  Drug use: No  
 
 
Allergies: Allergies Allergen Reactions  Ace Inhibitors Cough  Amlodipine Swelling  Avapro [Irbesartan] Unable to Obtain  Bactrim [Sulfamethoxazole-Trimethoprim] Other (comments) Sore mouth  Captopril Cough  Carvedilol Diarrhea  Contrast Agent [Iodine] Itching [address]  Morphine Nausea and Vomiting and Swelling  Penicillins Hives  Pravachol [Pravastatin] Nausea Only  Seafood [Shellfish Containing Products] Hives  Singulair [Montelukast] Other (comments)  
  palpitations Review of Systems Review of Systems Constitutional: Positive for chills and fever. HENT: Negative for congestion and sore throat. Respiratory: Negative for cough and shortness of breath. Cardiovascular: Negative for chest pain and leg swelling. Gastrointestinal: Negative for abdominal pain, diarrhea, nausea and vomiting. Endocrine: Negative for polydipsia, polyphagia and polyuria. Genitourinary: Negative for frequency and urgency. Musculoskeletal: Positive for arthralgias. Negative for back pain and neck pain. Skin: Positive for wound. Negative for rash. Neurological: Negative for light-headedness and headaches. Physical Exam  
 
Vitals:  
 08/09/19 1344 08/09/19 1716 08/09/19 1742 08/09/19 2034 BP: 159/67  133/61 151/69 Pulse: 65  76 64 Resp:   18 18 Temp:  98.3 °F (36.8 °C) 98.8 °F (37.1 °C) 99.3 °F (37.4 °C) SpO2:   98% 99% Weight:      
Height:      
 
Physical Exam  
Constitutional: She is oriented to person, place, and time. She appears well-developed and well-nourished. No distress. HENT:  
Head: Normocephalic and atraumatic. Mouth/Throat: Oropharynx is clear and moist.  
Cardiovascular: Normal rate, regular rhythm and normal heart sounds. Pulmonary/Chest: Effort normal and breath sounds normal.  
Musculoskeletal:  
Bilateral lower leg cellulitis, left appears chronic mildly warm to the touch and tender. Right lower leg has significant erythema, tenderness to palpation and warmth to the touch. Patient has significant tenderness to palpation of posterior calf as well. Neurovascularly intact Neurological: She is alert and oriented to person, place, and time. Skin: Skin is warm and dry. She is not diaphoretic. There is erythema. Bilateral lower leg cellulitis, left appears chronic mildly warm to the touch and tender. Right lower leg has significant erythema, tenderness to palpation and warmth to the touch. Patient has significant tenderness to palpation of posterior calf as well. Neurovascularly intact Psychiatric: She has a normal mood and affect. Her behavior is normal. Judgment and thought content normal.  
Nursing note and vitals reviewed. Diagnostic Study Results Labs - Recent Results (from the past 12 hour(s)) CBC WITH AUTOMATED DIFF Collection Time: 08/09/19 10:22 AM  
Result Value Ref Range WBC 13.5 (H) 3.6 - 11.0 K/uL  
 RBC 3.50 (L) 3.80 - 5.20 M/uL  
 HGB 10.5 (L) 11.5 - 16.0 g/dL HCT 34.4 (L) 35.0 - 47.0 % MCV 98.3 80.0 - 99.0 FL  
 MCH 30.0 26.0 - 34.0 PG  
 MCHC 30.5 30.0 - 36.5 g/dL  
 RDW 13.8 11.5 - 14.5 % PLATELET 086 472 - 544 K/uL MPV 10.1 8.9 - 12.9 FL  
 NRBC 0.0 0  WBC ABSOLUTE NRBC 0.00 0.00 - 0.01 K/uL NEUTROPHILS 91 (H) 32 - 75 % LYMPHOCYTES 4 (L) 12 - 49 % MONOCYTES 4 (L) 5 - 13 % EOSINOPHILS 0 0 - 7 % BASOPHILS 0 0 - 1 % IMMATURE GRANULOCYTES 1 (H) 0.0 - 0.5 % ABS. NEUTROPHILS 12.4 (H) 1.8 - 8.0 K/UL  
 ABS. LYMPHOCYTES 0.5 (L) 0.8 - 3.5 K/UL  
 ABS. MONOCYTES 0.5 0.0 - 1.0 K/UL  
 ABS. EOSINOPHILS 0.0 0.0 - 0.4 K/UL  
 ABS. BASOPHILS 0.0 0.0 - 0.1 K/UL  
 ABS. IMM. GRANS. 0.1 (H) 0.00 - 0.04 K/UL  
 DF AUTOMATED    
 RBC COMMENTS NORMOCYTIC, NORMOCHROMIC METABOLIC PANEL, COMPREHENSIVE Collection Time: 08/09/19 10:22 AM  
Result Value Ref Range Sodium 137 136 - 145 mmol/L Potassium 4.2 3.5 - 5.1 mmol/L Chloride 107 97 - 108 mmol/L  
 CO2 22 21 - 32 mmol/L Anion gap 8 5 - 15 mmol/L Glucose 218 (H) 65 - 100 mg/dL BUN 27 (H) 6 - 20 MG/DL Creatinine 2.01 (H) 0.55 - 1.02 MG/DL  
 BUN/Creatinine ratio 13 12 - 20 GFR est AA 31 (L) >60 ml/min/1.73m2 GFR est non-AA 25 (L) >60 ml/min/1.73m2 Calcium 8.7 8.5 - 10.1 MG/DL Bilirubin, total 0.6 0.2 - 1.0 MG/DL  
 ALT (SGPT) 14 12 - 78 U/L  
 AST (SGOT) 11 (L) 15 - 37 U/L Alk. phosphatase 103 45 - 117 U/L Protein, total 7.4 6.4 - 8.2 g/dL Albumin 3.2 (L) 3.5 - 5.0 g/dL Globulin 4.2 (H) 2.0 - 4.0 g/dL A-G Ratio 0.8 (L) 1.1 - 2.2 LACTIC ACID Collection Time: 08/09/19 10:22 AM  
Result Value Ref Range Lactic acid 2.0 0.4 - 2.0 MMOL/L  
SED RATE (ESR) Collection Time: 08/09/19 10:22 AM  
Result Value Ref Range Sed rate, automated 51 (H) 0 - 30 mm/hr POC LACTIC ACID Collection Time: 08/09/19 11:53 AM  
Result Value Ref Range Lactic Acid (POC) 2.15 (HH) 0.40 - 2.00 mmol/L  
EKG, 12 LEAD, INITIAL Collection Time: 08/09/19  3:33 PM  
Result Value Ref Range Ventricular Rate 65 BPM  
 Atrial Rate 65 BPM  
 P-R Interval 130 ms QRS Duration 114 ms Q-T Interval 456 ms  
 QTC Calculation (Bezet) 474 ms Calculated R Axis -31 degrees Calculated T Axis 103 degrees Diagnosis Normal sinus rhythm Left axis deviation Septal infarct (cited on or before 09-APR-2014) T wave abnormality, consider lateral ischemia When compared with ECG of 09-APR-2014 09:43, 
T wave inversion more evident in Lateral leads QT has shortened GLUCOSE, POC Collection Time: 08/09/19  3:53 PM  
Result Value Ref Range Glucose (POC) 163 (H) 65 - 100 mg/dL Performed by [facility] PROTHROMBIN TIME + INR Collection Time: 08/09/19  4:06 PM  
Result Value Ref Range INR 1.8 (H) 0.9 - 1.1 Prothrombin time 17.9 (H) 9.0 - 11.1 sec POC LACTIC ACID Collection Time: 08/09/19  4:15 PM  
Result Value Ref Range Lactic Acid (POC) 0.82 0.40 - 2.00 mmol/L  
GLUCOSE, POC Collection Time: 08/09/19  5:41 PM  
Result Value Ref Range Glucose (POC) 206 (H) 65 - 100 mg/dL Performed by [facility] (PCT) Radiologic Studies -  
XR TIB/FIB RT Final Result IMPRESSION: Diffuse soft tissue swelling. No acute osseous abnormality. XR CHEST PORT Final Result Impression: No acute process. CT Results  (Last 48 hours) None CXR Results  (Last 48 hours) 08/09/19 1230  XR CHEST PORT Final result Impression:  Impression: No acute process. Narrative: Indication: Fever Comparison: 4/9/2014 Portable exam of the chest obtained at 1156 demonstrates normal heart size. Pacer leads are unchanged in position. There is no acute process in the lung  
fields. The osseous structures are unremarkable. Medical Decision Making I am the first provider for this patient. I reviewed the vital signs, available nursing notes, past medical history, past surgical history, social history Pulse Oximetry Analysis - 99% on RA Cardiac Monitor:  
Rate: 99 bpm 
Rhythm: NSR 
 
EKG: Interpreted by Dr. Marielos Carbajal Time Interpreted: 3:39 PM 
Rate: 65 bpm 
Normal sinus rhythm Left axis deviation Septal infarct (cited on or before 09-APR-2014) T wave abnormality, consider lateral ischemia When compared with ECG of 09-APR-2014 09:43,  
T wave inversion more evident in Lateral leads QT has shortened ED Course and Progress notes:  
Initial assessment performed. The patients presenting problems have been discussed, and they are in agreement with the care plan formulated and outlined with them. I have encouraged them to ask questions as they arise throughout their visit. ADMIT NOTE: The patient is being admitted to the hospitalist service. The results of their tests and reasons for their admission have been discussed with the patient and/or available family. They convey agreement and understanding for the need to be admitted and for their admission diagnosis.  
 
  
 
On re evaluation pt is resting comfortably, and has no new complaints, changes, or physical findings. The patient has improved and is stable. Procedures: 
Procedures Critical Care Time: none Vital Signs-Reviewed the patient's vital signs. Vitals:  
 08/09/19 1344 08/09/19 1716 08/09/19 1742 08/09/19 2034 BP: 159/67  133/61 151/69 BP 1 Location:   Left arm Comment: Forearm BP Patient Position:   Sitting At rest  
Pulse: 65  76 64 Resp:   18 18 Temp:  98.3 °F (36.8 °C) 98.8 °F (37.1 °C) 99.3 °F (37.4 °C) SpO2:   98% 99% Weight:      
Height:      
 
 
Medications Administered During ED Course Medications  
sodium chloride (NS) flush 5-40 mL (10 mL IntraVENous Given 8/9/19 1355)  
sodium chloride (NS) flush 5-40 mL (has no administration in time range)  
acetaminophen (TYLENOL) tablet 650 mg (has no administration in time range)  
cefepime (MAXIPIME) 2 g in 0.9% sodium chloride (MBP/ADV) 100 mL (has no administration in time range)  
vancomycin (VANCOCIN) 1250 mg in  ml infusion (has no administration in time range)  
sodium chloride (NS) flush 5-40 mL (10 mL IntraVENous Given 8/9/19 1821)  
sodium chloride (NS) flush 5-40 mL (has no administration in time range)  
ondansetron (ZOFRAN) injection 4 mg (has no administration in time range)  
busPIRone (BUSPAR) tablet 10 mg (10 mg Oral Given 8/9/19 1819)  
cloNIDine HCl (CATAPRES) tablet 0.6 mg (has no administration in time range) cyanocobalamin (VITAMIN B12) tablet 1,000 mcg (has no administration in time range)  
hydrALAZINE (APRESOLINE) tablet 100 mg (100 mg Oral Given 8/9/19 1819) insulin glargine (LANTUS) injection 20 Units (has no administration in time range) metoprolol succinate (TOPROL-XL) XL tablet 100 mg (has no administration in time range)  
sertraline (ZOLOFT) tablet 75 mg (has no administration in time range) doxazosin (CARDURA) tablet 4 mg (has no administration in time range)  
cholecalciferol (VITAMIN D3) tablet 5,000 Units (has no administration in time range) insulin lispro (HUMALOG) injection (3 Units SubCUTAneous Given 8/9/19 1820)  
glucose chewable tablet 16 g (has no administration in time range) glucagon (GLUCAGEN) injection 1 mg (has no administration in time range) dextrose 10% infusion 125-250 mL (has no administration in time range) WARFARIN INFORMATION NOTE (COUMADIN) ( Other Given 8/9/19 1800) bumetanide (BUMEX) tablet 2 mg (has no administration in time range) potassium chloride SR (KLOR-CON 10) tablet 10 mEq (has no administration in time range)  
acetaminophen (TYLENOL) tablet 1,000 mg (1,000 mg Oral Given 8/9/19 1050)  
sodium chloride 0.9 % bolus infusion 500 mL (500 mL IntraVENous New Bag 8/9/19 1047) vancomycin (VANCOCIN) 2000 mg in  ml infusion (2,000 mg IntraVENous New Bag 8/9/19 1519) cefepime (MAXIPIME) 2 g in 0.9% sodium chloride (MBP/ADV) 100 mL (2 g IntraVENous New Bag 8/9/19 1347) potassium chloride (K-DUR, KLOR-CON) SR tablet 20 mEq (20 mEq Oral Given 8/9/19 1353) bumetanide (BUMEX) injection 1 mg (1 mg IntraVENous Given 8/9/19 1344) warfarin (COUMADIN) tablet 4 mg (4 mg Oral Given 8/9/19 1819) Disposition: 
admit Follow-up Information None Current Discharge Medication List  
  
 
 
Provider Notes (Medical Decision Making):  
Differential diagnosis: Cellulitis, osteomyelitis, abscess, chronic cellulitis/ulcer, DVT, necrotizing fasciitis Diagnosis Clinical Impression: 1. Cellulitis of right lower extremity 2. Sepsis, due to unspecified organism ([address] 3. Cellulitis and abscess of right leg 4. Diabetic ulcer of right midfoot associated with type 2 diabetes mellitus, with fat layer exposed ([address] Please note that this dictation was completed with "[employer] the computer voice recognition software. Quite often unanticipated grammatical, syntax, homophones, and other interpretive errors are inadvertently transcribed by the computer software. Please disregard these errors. Please excuse any errors that have escaped final proofreading. This note will not be viewable in [address]

## 2019-08-09 NOTE — WOUND CARE
08/09/19 9447 Wound Foot Right;Plantar;Lateral 07/30/19 Date First Assessed/Time First Assessed: 07/30/19 1034   Present on Hospital Admission: Yes  Wound Approximate Age at First Assessment (Weeks): 1 weeks  Primary Wound Type: Diabetic Ulcer  Location: Foot  Wound Location Orientation: Right;Plantar;Late. .. Dressing Status Removed Dressing Type Foam;Gauze;Gauze wrap (arnie) (Hydrofera blue) Wound Length (cm) 1.2 cm Wound Width (cm) 1.1 cm Wound Depth (cm) 0.1 cm Wound Volume (cm^3) 0.13 cm^3 Condition of Base Pink;Slough Condition of Edges Calloused Sandra-wound Assessment Intact Cleansing and Cleansing Agents  Normal saline Dressing Type Applied Alginate;Foam;Gauze;Gauze wrap (arnie); Silver products Wound Breast Left Date First Assessed/Time First Assessed: 06/07/19 0834   Location: Breast  Wound Location Orientation: Left Dressing Status Removed Dressing Type Vacuum dressing;Negative pressure wound therapy Wound Length (cm) 2.1 cm Wound Width (cm) 4 cm Wound Depth (cm) 0.1 cm Wound Volume (cm^3) 0.84 cm^3 Condition of Base Granulation Condition of Edges Open Tissue Type Percent Red 100 Drainage Amount Moderate Drainage Color Serosanguinous Wound Odor None Sandra-wound Assessment Intact Cleansing and Cleansing Agents  Normal saline Dressing Type Applied Alginate;Silver products;Foam;Gauze;Gauze wrap (arnie) Wound Foot Left;Plantar Date First Assessed/Time First Assessed: 06/21/19 0857   Present on Hospital Admission: Yes  Primary Wound Type: Diabetic  Location: Foot  Wound Location Orientation: Left;Plantar Dressing Status Removed Dressing Type Aquacel;Foam;Silver products Wound Length (cm) 1.5 cm Wound Width (cm) 1.9 cm Wound Depth (cm) 0.2 cm Wound Volume (cm^3) 0.57 cm^3 Condition of Base Slough;Pink Condition of Edges Calloused Drainage Amount Moderate Drainage Color Serosanguinous Wound Odor None Sandra-wound Assessment Maceration Cleansing and Cleansing Agents  Soap and water;Normal saline Dressing Type Applied Alginate;Silver products;Foam;Gauze;Gauze wrap (arnie) Patient arrived for nurse visit for dressing changes-R plantar foot wound and Left Breast wound (snap vac). RLE w/erythema and increased warmth from above calf to ankle region. Oral temp initially 100.3 F, and 100.5 F when repeated. Dr. Arvin Hills notified. LLE Total contact cast removed (L plantar heel wound) per MD. No overt s/s infection of wounds noted. Patient instructed by Dr. Arvin Hills, to go to Emergency Room for Evaluation. Patient and  verbalized understanding.

## 2019-08-09 NOTE — ED NOTES
TRANSFER - OUT REPORT: 
 
Verbal report given to Jonnie Rincon RN(name) on Christopher Pinedo  being transferred to [address] [place] [address](unit) for routine progression of care Report consisted of patients Situation, Background, Assessment and  
Recommendations(SBAR). Information from the following report(s) SBAR, Kardex, ED Summary, MAR, Recent Results and Cardiac Rhythm NSR was reviewed with the receiving nurse. Lines:  
Peripheral IV 08/09/19 Left Antecubital (Active) Site Assessment Clean, dry, & intact 8/9/2019 10:27 AM  
Phlebitis Assessment 0 8/9/2019 10:27 AM  
Infiltration Assessment 0 8/9/2019 10:27 AM  
Dressing Status Clean, dry, & intact 8/9/2019 10:27 AM  
Dressing Type Transparent 8/9/2019 10:27 AM  
Hub Color/Line Status Pink;Flushed 8/9/2019 10:27 AM  
Alcohol Cap Used Yes 8/9/2019 10:27 AM  
  
 
Opportunity for questions and clarification was provided.

## 2019-08-09 NOTE — PROGRESS NOTES
Reason for Admission:   sepsis RRAT Score:     17 moderate risk Do you (patient/family) have any concerns for transition/discharge? Pt states her  is a hoarder Plan for utilizing home health:   Has used home health in the past 
 
Current Advanced Directive/Advance Care Plan:  none Transition of Care Plan: 1. Patient in ED bed waiting for inpatient admission 2. Patient prefers to discharge home with family assistance and follow up appointments. Patient does not feel she needs more assistance at home at this time Patient is a 60year old female admitted 8/9 for sepsis. Patient alert and oriented, resting in bed, no visitors present. Demographic information verified and correct. Insurance information verified and correct. Patient lives with her , no home oxygen, uses a cane for ambulation and has used home health in the past.  Patient uses [facility] in [place]. Patient states she is independent with ADLs and can drive. Patient's  can transport at discharge. Care Management Interventions PCP Verified by CM: Alfrieda Galeazzi MD) Mode of Transport at Discharge: Other (see comment)( can transport at [place]) Transition of Care Consult (CM Consult): Discharge Planning Discharge Durable Medical Equipment: No 
Physical Therapy Consult: No 
Occupational Therapy Consult: No 
Speech Therapy Consult: No 
Current Support Network: Lives with Spouse, Own Home(1 story house, outside [facility]) Confirm Follow Up Transport: Family Plan discussed with Pt/Family/Caregiver: Yes Discharge Location Discharge Placement: Home Doreen Chen RN, BSN [facility] [phone]

## 2019-08-09 NOTE — PROGRESS NOTES
Pharmacy Clarification of the Prior to Admission Medication Regimen Retrospective to the Admission Medication Reconciliation The patient was interviewed regarding clarification of the prior to admission medication regimen. Patient's  was present in room and obtained permission from patient to discuss drug regimen with visitor(s) present. Patient was questioned regarding use of any other inhalers, topical products, over the counter medications, herbal medications, vitamin products or ophthalmic/nasal/otic medication use. Information Obtained From: RX Query, Patient Recommendations/Findings: The following amendments were made to the patient's active medication list on file at [facility]:  
 
1) Additions: None 2) Removals:  
Tums 3) Changes: ACETAMINOPHEN/DIPHENHYDRAMINE (TYLENOL PM PO) (Old regimen: 2 tabs QHS /New regimen: 3 tabs QHS) bumetanide (BUMEX) 1 mg tablet (Old regimen: 1 mg BID /New regimen: 2 mg daily) 
cloNIDine HCl (CATAPRES) 0.3 mg tablet (Old regimen: 1 tab BID /New regimen: 2 tabs daily) 4) Pertinent Pharmacy Findings: 
Identified High Alert Medication Information Current Anticoagulants: 
Name: Warfarin PTA medication list was corrected to the following:  
 
Prior to Admission Medications Prescriptions Last Dose Informant Patient Reported? Taking? ACETAMINOPHEN/DIPHENHYDRAMINE (TYLENOL PM PO) 8/8/2019 at Unknown time Self Yes Yes Sig: Take 3 Tabs by mouth nightly as needed. bumetanide (BUMEX) 1 mg tablet 8/8/2019 at Unknown time Self Yes Yes Sig: Take 2 mg by mouth daily. busPIRone (BUSPAR) 10 mg tablet 8/8/2019 at Unknown time Self No Yes Sig: TAKE ONE TABLET BY MOUTH TWICE A DAY cholecalciferol, VITAMIN D3, (VITAMIN D3) 5,000 unit tab tablet 8/8/2019 at Unknown time Self Yes Yes Sig: Take 5,000 Units by mouth daily. cloNIDine HCl (CATAPRES) 0.3 mg tablet 8/8/2019 at Unknown time Self Yes Yes Sig: Take 0.6 mg by mouth nightly. cyanocobalamin (VITAMIN B-12) 1,000 mcg sublingual tablet 2019 at Unknown time Self Yes Yes Sig: Take 1,000 mcg by mouth daily. doxazosin (CARDURA) 4 mg tablet 2019 at Unknown time Self No Yes Sig: TAKE ONE TABLET BY MOUTH ONCE NIGHTLY  
hydrALAZINE (APRESOLINE) 100 mg tablet 2019 at Unknown time Self No Yes Sig: TAKE ONE TABLET BY MOUTH THREE TIMES A DAY  
insulin glargine (LANTUS U-100 INSULIN) 100 unit/mL injection 2019 at Unknown time Self No Yes Si Units by SubCUTAneous route daily. insulin lispro (HUMALOG) 100 unit/mL kwikpen 2019 at Unknown time Self Yes Yes Si units with dinner for sugars over 200  
metolazone (ZAROXOLYN) 5 mg tablet 2019 at Unknown time Self No Yes Sig: Take 1 Tab by mouth daily as needed (take if develop increased LE edema, weight gain > 5 pounds. ). metoprolol succinate (TOPROL-XL) 100 mg tablet 2019 at Unknown time Self No Yes Sig: TAKE TWO TABLETS BY MOUTH DAILY potassium chloride SR (KLOR-CON 10) 10 mEq tablet 2019 at Unknown time Self No Yes Sig: TAKE ONE TABLET BY MOUTH DAILY  
sertraline (ZOLOFT) 50 mg tablet 2019 at Unknown time Self No Yes Sig: TAKE ONE AND ONE-HALF (1 & 1/2) TABLET BY MOUTH DAILY  
vitamin A 8,000 unit capsule 2019 at Unknown time Self Yes Yes Sig: Take 8,000 Units by mouth daily. warfarin (COUMADIN) 4 mg tablet 2019 at Unknown time Self Yes Yes Sig: Take 4 mg by mouth every Monday and Thursday. Pt takes 4 mg Mon and Th and 2 mg the other 5 days a week  
warfarin (COUMADIN) 4 mg tablet 2019 at Unknown time Self Yes Yes Sig: Take 2 mg by mouth five (5) days a week. Pt takes 4 mg Mon and Thurs and 2 mg the other 5 days a week Facility-Administered Medications: None Thank you, 
Demetria Zepeda Medication History Pharmacy Technician

## 2019-08-09 NOTE — PROGRESS NOTES
Bedside and Verbal shift change report given to Britta Griffith (oncoming nurse) by Aylin Braswell (offgoing nurse). Report included the following information SBAR, Kardex, ED Summary, Intake/Output and Recent Results.

## 2019-08-09 NOTE — PROGRESS NOTES
[address] 
WOUND CARE PROGRESS NOTE Name:  Lisabeth Dubin 
MR#:  [id number] :  1960 ACCOUNT #:  [de-identified] DATE OF SERVICE:  2019 SUBJECTIVE:  The patient presented today for a nursing visit. She related to the nurses that she has not been feeling well and she has been running a low-grade fever at home and is aware of increased erythema of the right leg. Since we are unsure of the source of the fever, we decided to remove her total contact cast, which had been applied 3 days ago. OBJECTIVE:  Her temperature initially was 100.3, after taking Tylenol it went up to 100.5 fifteen minutes later. Pulse 67, respirations 16, blood pressure 145/69. She denies dysuria or a cough or any other symptoms. The wound of the left breast is clean and measures 2.1 x 1.1 x 0.1 cm. The wound to the right plantar foot is smaller than previously at 1.2 x 1.1 x 0.1 cm and the wound of the left foot is smaller 2.1 x 4 x 0.1 cm. The most significant finding is the erythema of the right leg. She is tender over the calf. She has a negative Homans sign. ASSESSMENT AND PLAN:  I explained to the patient that because of her worsening erythema, which could either represent a cellulitis or a deep vein thrombosis in spite of her being on blood thinners for chronic atrial fibrillation, with her diabetes we have to make sure and rule out significant pathology, especially before the weekend. Therefore, I called the emergency room, they are awaiting her arrival and she is going to be evaluated and quite possibly admitted. She understands what we are trying to rule out. She understands our thought process of why we are doing, what we are doing and for now we are simply going to dress her wounds with Aquacel Ag to all 3 wounds. All questions were answered. Her condition on discharge is stable.  
 
 
 
Joselyn Stoll MD 
 
 
[id number] 
D:  2019 9:52 
 T: 08/09/2019 10:03 JOB #:  [id number]

## 2019-08-09 NOTE — PROGRESS NOTES
Pharmacy Automatic Renal Dosing Protocol - Antimicrobials Indication for Antimicrobials: SSTI Current Regimen of Each Antimicrobial: 
Cefepime 2 gm IV every 8 hours (Start Date ; Day # 1) Previous Antimicrobial Therapy: 
Cefepime 2 gm IV once (Start Date ; Day Vancomycin 2000 mg IV x 1 dose (Start Date ; Day Goal Level: VANCOMYCIN TROUGH GOAL RANGE Vancomycin Trough: 15 - 20 mcg/mL  (AUC: 400 - 600 mg/hr/Liter/day) Significant Cultures:  
 paired blood: pending Radiology / Imaging results: (X-ray, CT scan or MRI):  
 Xray right tib/fib: Diffuse soft tissue swelling. No acute osseous abnormality Paralysis, amputations, malnutrition: not noted Labs: 
Recent Labs 19 
1022 CREA 2.01* BUN 27* WBC 13.5* Temp (24hrs), Av.5 °F (38.1 °C), Min:100.3 °F (37.9 °C), Max:100.6 °F (38.1 °C) Creatinine Clearance (mL/min) or Dialysis: 31.5 ml/min Impression/Plan:  
Change cefepime dose to 2 gm IV every 12 hours Vancomycin 2000 mg IV x 1, followed by 1250 mg IV every 24 hours for a predicted trough of ~15 mcg/ml () BMP daily Antimicrobial stop date TBD Pharmacy will follow daily and adjust medications as appropriate for renal function and/or serum levels. Thank you, Cristobal Ferris, PharmD

## 2019-08-09 NOTE — ED NOTES
Bedside shift change report given to [id number] (oncoming nurse) by Alice Lassiter (offgoing nurse). Report included the following information ED Summary.

## 2019-08-10 ENCOUNTER — APPOINTMENT (OUTPATIENT)
Dept: CT IMAGING | Age: 59
DRG: 872 | End: 2019-08-10
Attending: INTERNAL MEDICINE
Payer: COMMERCIAL

## 2019-08-10 LAB
ANION GAP SERPL CALC-SCNC: 7 MMOL/L (ref 5–15)
ATRIAL RATE: 65 BPM
BASOPHILS # BLD: 0.1 K/UL (ref 0–0.1)
BASOPHILS NFR BLD: 1 % (ref 0–1)
BUN SERPL-MCNC: 28 MG/DL (ref 6–20)
BUN/CREAT SERPL: 14 (ref 12–20)
CALCIUM SERPL-MCNC: 8.8 MG/DL (ref 8.5–10.1)
CALCULATED R AXIS, ECG10: -31 DEGREES
CALCULATED T AXIS, ECG11: 103 DEGREES
CHLORIDE SERPL-SCNC: 109 MMOL/L (ref 97–108)
CO2 SERPL-SCNC: 22 MMOL/L (ref 21–32)
CREAT SERPL-MCNC: 1.95 MG/DL (ref 0.55–1.02)
DIAGNOSIS, 93000: NORMAL
DIFFERENTIAL METHOD BLD: ABNORMAL
EOSINOPHIL # BLD: 0.1 K/UL (ref 0–0.4)
EOSINOPHIL NFR BLD: 2 % (ref 0–7)
ERYTHROCYTE [DISTWIDTH] IN BLOOD BY AUTOMATED COUNT: 13.9 % (ref 11.5–14.5)
GLUCOSE BLD STRIP.AUTO-MCNC: 129 MG/DL (ref 65–100)
GLUCOSE BLD STRIP.AUTO-MCNC: 146 MG/DL (ref 65–100)
GLUCOSE BLD STRIP.AUTO-MCNC: 162 MG/DL (ref 65–100)
GLUCOSE BLD STRIP.AUTO-MCNC: 174 MG/DL (ref 65–100)
GLUCOSE SERPL-MCNC: 151 MG/DL (ref 65–100)
HCT VFR BLD AUTO: 31.8 % (ref 35–47)
HGB BLD-MCNC: 9.8 G/DL (ref 11.5–16)
IMM GRANULOCYTES # BLD AUTO: 0 K/UL (ref 0–0.04)
IMM GRANULOCYTES NFR BLD AUTO: 0 % (ref 0–0.5)
INR PPP: 2.1 (ref 0.9–1.1)
LYMPHOCYTES # BLD: 1 K/UL (ref 0.8–3.5)
LYMPHOCYTES NFR BLD: 11 % (ref 12–49)
MCH RBC QN AUTO: 30 PG (ref 26–34)
MCHC RBC AUTO-ENTMCNC: 30.8 G/DL (ref 30–36.5)
MCV RBC AUTO: 97.2 FL (ref 80–99)
MONOCYTES # BLD: 0.6 K/UL (ref 0–1)
MONOCYTES NFR BLD: 6 % (ref 5–13)
NEUTS SEG # BLD: 7.4 K/UL (ref 1.8–8)
NEUTS SEG NFR BLD: 80 % (ref 32–75)
NRBC # BLD: 0 K/UL (ref 0–0.01)
NRBC BLD-RTO: 0 PER 100 WBC
P-R INTERVAL, ECG05: 130 MS
PLATELET # BLD AUTO: 267 K/UL (ref 150–400)
PMV BLD AUTO: 10 FL (ref 8.9–12.9)
POTASSIUM SERPL-SCNC: 4.2 MMOL/L (ref 3.5–5.1)
PROTHROMBIN TIME: 21.1 SEC (ref 9–11.1)
Q-T INTERVAL, ECG07: 456 MS
QRS DURATION, ECG06: 114 MS
QTC CALCULATION (BEZET), ECG08: 474 MS
RBC # BLD AUTO: 3.27 M/UL (ref 3.8–5.2)
SERVICE CMNT-IMP: ABNORMAL
SODIUM SERPL-SCNC: 138 MMOL/L (ref 136–145)
VENTRICULAR RATE, ECG03: 65 BPM
WBC # BLD AUTO: 9.2 K/UL (ref 3.6–11)

## 2019-08-10 PROCEDURE — 74011250636 HC RX REV CODE- 250/636: Performed by: HOSPITALIST

## 2019-08-10 PROCEDURE — 36415 COLL VENOUS BLD VENIPUNCTURE: CPT

## 2019-08-10 PROCEDURE — 82962 GLUCOSE BLOOD TEST: CPT

## 2019-08-10 PROCEDURE — 85610 PROTHROMBIN TIME: CPT

## 2019-08-10 PROCEDURE — 85025 COMPLETE CBC W/AUTO DIFF WBC: CPT

## 2019-08-10 PROCEDURE — 65660000000 HC RM CCU STEPDOWN

## 2019-08-10 PROCEDURE — 74011250637 HC RX REV CODE- 250/637: Performed by: HOSPITALIST

## 2019-08-10 PROCEDURE — 74011636637 HC RX REV CODE- 636/637: Performed by: HOSPITALIST

## 2019-08-10 PROCEDURE — 80048 BASIC METABOLIC PNL TOTAL CA: CPT

## 2019-08-10 PROCEDURE — 74011000258 HC RX REV CODE- 258: Performed by: HOSPITALIST

## 2019-08-10 PROCEDURE — 94760 N-INVAS EAR/PLS OXIMETRY 1: CPT

## 2019-08-10 PROCEDURE — 74011250637 HC RX REV CODE- 250/637: Performed by: INTERNAL MEDICINE

## 2019-08-10 PROCEDURE — 73700 CT LOWER EXTREMITY W/O DYE: CPT

## 2019-08-10 RX ORDER — WARFARIN 1 MG/1
2 TABLET ORAL ONCE
Status: COMPLETED | OUTPATIENT
Start: 2019-08-10 | End: 2019-08-10

## 2019-08-10 RX ADMIN — INSULIN LISPRO 2 UNITS: 100 INJECTION, SOLUTION INTRAVENOUS; SUBCUTANEOUS at 12:38

## 2019-08-10 RX ADMIN — HYDRALAZINE HYDROCHLORIDE 100 MG: 50 TABLET, FILM COATED ORAL at 06:24

## 2019-08-10 RX ADMIN — DOXAZOSIN 4 MG: 2 TABLET ORAL at 22:41

## 2019-08-10 RX ADMIN — BUSPIRONE HYDROCHLORIDE 10 MG: 5 TABLET ORAL at 17:10

## 2019-08-10 RX ADMIN — VITAMIN D, TAB 1000IU (100/BT) 5000 UNITS: 25 TAB at 08:54

## 2019-08-10 RX ADMIN — INSULIN LISPRO 2 UNITS: 100 INJECTION, SOLUTION INTRAVENOUS; SUBCUTANEOUS at 08:54

## 2019-08-10 RX ADMIN — Medication 10 ML: at 22:41

## 2019-08-10 RX ADMIN — HYDRALAZINE HYDROCHLORIDE 100 MG: 50 TABLET, FILM COATED ORAL at 13:10

## 2019-08-10 RX ADMIN — BUSPIRONE HYDROCHLORIDE 10 MG: 5 TABLET ORAL at 08:54

## 2019-08-10 RX ADMIN — Medication 10 ML: at 06:24

## 2019-08-10 RX ADMIN — CLONIDINE HYDROCHLORIDE 0.6 MG: 0.1 TABLET ORAL at 22:41

## 2019-08-10 RX ADMIN — BUMETANIDE 2 MG: 1 TABLET ORAL at 08:54

## 2019-08-10 RX ADMIN — HYDRALAZINE HYDROCHLORIDE 100 MG: 50 TABLET, FILM COATED ORAL at 22:41

## 2019-08-10 RX ADMIN — CEFEPIME HYDROCHLORIDE 2 G: 2 INJECTION, POWDER, FOR SOLUTION INTRAVENOUS at 01:24

## 2019-08-10 RX ADMIN — VANCOMYCIN HYDROCHLORIDE 1250 MG: 10 INJECTION, POWDER, LYOPHILIZED, FOR SOLUTION INTRAVENOUS at 14:26

## 2019-08-10 RX ADMIN — METOPROLOL SUCCINATE 100 MG: 50 TABLET, EXTENDED RELEASE ORAL at 08:54

## 2019-08-10 RX ADMIN — INSULIN GLARGINE 20 UNITS: 100 INJECTION, SOLUTION SUBCUTANEOUS at 08:54

## 2019-08-10 RX ADMIN — POTASSIUM CHLORIDE 10 MEQ: 750 TABLET, EXTENDED RELEASE ORAL at 08:54

## 2019-08-10 RX ADMIN — CYANOCOBALAMIN TAB 500 MCG 1000 MCG: 500 TAB at 08:54

## 2019-08-10 RX ADMIN — CEFEPIME HYDROCHLORIDE 2 G: 2 INJECTION, POWDER, FOR SOLUTION INTRAVENOUS at 13:10

## 2019-08-10 RX ADMIN — Medication 10 ML: at 14:25

## 2019-08-10 RX ADMIN — SERTRALINE HYDROCHLORIDE 75 MG: 50 TABLET ORAL at 09:10

## 2019-08-10 RX ADMIN — WARFARIN SODIUM 2 MG: 1 TABLET ORAL at 17:11

## 2019-08-10 NOTE — PROGRESS NOTES
Pharmacy Daily Dosing of Warfarin Indication: afib Goal INR: 2-3 PTA Warfarin Dose: 4 mg on Mon, Thurs; 2 mg daily on the other days Concurrent anticoagulants:  
 
Concurrent antiplatelet:  
 
Major Interacting Medications Drugs that may increase INR:  
Drugs that may decrease INR:  
 
Conditions that may increase/decrease INR (CHF, C. diff, cirrhosis, thyroid disorder, hypoalbuminemia):  
 
Labs: 
Recent Labs 08/10/19 
0520 08/09/19 
1606 08/09/19 
1022 INR 2.1* 1.8*  --   
HGB 9.8*  --  10.5*   --  278 SGOT  --   --  11* TBILI  --   --  0.6 ALB  --   --  3.2* Impression/Plan:  
Will order warfarin 2 mg PO x 1 dose today Daily INR 
CBC w/o diff QOD Pharmacy will follow daily and adjust the dose as appropriate. Thanks Charlie Frost PHARMD 
 
 
[email or web address]/[email or web address]. [email or web address]

## 2019-08-10 NOTE — PROGRESS NOTES
Problem: Patient Education: Go to Patient Education Activity Goal: Patient/Family Education Outcome: Progressing Towards Goal 
  
Problem: Patient Education: Go to Patient Education Activity Goal: Patient/Family Education Outcome: Progressing Towards Goal 
  
Problem: Diabetes Self-Management Goal: *Disease process and treatment process Description Define diabetes and identify own type of diabetes; list 3 options for treating diabetes. Outcome: Progressing Towards Goal 
Goal: *Incorporating nutritional management into lifestyle Description Describe effect of type, amount and timing of food on blood glucose; list 3 methods for planning meals. Outcome: Progressing Towards Goal 
Goal: *Incorporating physical activity into lifestyle Description State effect of exercise on blood glucose levels. Outcome: Progressing Towards Goal 
Goal: *Developing strategies to promote health/change behavior Description Define the ABC's of diabetes; identify appropriate screenings, schedule and personal plan for screenings. Outcome: Progressing Towards Goal 
Goal: *Using medications safely Description State effect of diabetes medications on diabetes; name diabetes medication taking, action and side effects. Outcome: Progressing Towards Goal 
Goal: *Monitoring blood glucose, interpreting and using results Description Identify recommended blood glucose targets  and personal targets. Outcome: Progressing Towards Goal 
Goal: *Prevention, detection, treatment of acute complications Description List symptoms of hyper- and hypoglycemia; describe how to treat low blood sugar and actions for lowering  high blood glucose level. Outcome: Progressing Towards Goal 
Goal: *Prevention, detection and treatment of chronic complications Description Define the natural course of diabetes and describe the relationship of blood glucose levels to long term complications of diabetes.  
Outcome: Progressing Towards Goal 
 Goal: *Developing strategies to address psychosocial issues Description Describe feelings about living with diabetes; identify support needed and support network Outcome: Progressing Towards Goal 
Goal: *Insulin pump training Outcome: Progressing Towards Goal 
Goal: *Sick day guidelines Outcome: Progressing Towards Goal 
Goal: *Patient Specific Goal (EDIT GOAL, INSERT TEXT) Outcome: Progressing Towards Goal 
  
Problem: Patient Education: Go to Patient Education Activity Goal: Patient/Family Education Outcome: Progressing Towards Goal 
  
Problem: Falls - Risk of 
Goal: *Absence of Falls Description Document Lazaro To Fall Risk and appropriate interventions in the flowsheet. Outcome: Progressing Towards Goal 
Note:  
Fall Risk Interventions: 
Mobility Interventions: Utilize walker, cane, or other assistive device Medication Interventions: Assess postural VS orthostatic hypotension Elimination Interventions: Patient to call for help with toileting needs Problem: Patient Education: Go to Patient Education Activity Goal: Patient/Family Education Outcome: Progressing Towards Goal

## 2019-08-10 NOTE — PROGRESS NOTES
Hospitalist Progress Note NAME: Karrie Murphy :  1960 MRN:  [id number] Assessment / Plan: 
Sepsis, POA - resolved, lactate normalized, WBC normalized, afebrile now 
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 
  
Code: discussed, Full DVT prophylaxis: coumadin Surrogate decision maker:   Recommended Disposition: Home w/Family and HH PT, OT, RN? TBD in next 24-48 hrs Subjective: Chief Complaint / Reason for Physician Visit :F/U R LE cellulitis, Chronic B/L LE lymphedema, Chronic B/L Planter/heel ulcers, DM 
\"I am better\". Discussed with RN events overnight. Review of Systems: 
Symptom Y/N Comments  Symptom Y/N Comments Fever/Chills n Resolved, Afebrile now in past 24 hrs  Chest Pain n   
Poor Appetite n   Edema y Chronic LE edema, R>L recent Cough n   Abdominal Pain n   
Sputum n   Joint Pain SOB/CHOW n   Pruritis/Rash y L LE  
Nausea/vomit n   Tolerating PT/OT y Diarrhea n   Tolerating Diet y Constipation    Other Could NOT obtain due to:   
 
Objective: VITALS:  
Last 24hrs VS reviewed since prior progress note. Most recent are: 
Patient Vitals for the past 24 hrs: 
 Temp Pulse Resp BP SpO2  
08/10/19 1133 98.1 °F (36.7 °C) 62 17 139/64 98 % 08/10/19 0740 98.5 °F (36.9 °C) 66 18 173/72 97 % 08/10/19 0517 98.2 °F (36.8 °C) 77 18 157/78 98 % 08/10/19 0000  64     
08/09/19 2034 99.3 °F (37.4 °C) 64 18 151/69 99 % 08/09/19 2000  64     
08/09/19 1742 98.8 °F (37.1 °C) 76 18 133/61 98 % 08/09/19 1716 98.3 °F (36.8 °C)      
08/09/19 1344  65  159/67   
08/09/19 1300    159/67 96 % No intake or output data in the 24 hours ending 08/10/19 1225 PHYSICAL EXAM: 
General: WD, WN. Alert, cooperative, no acute distress, Morbidly Obese +  
EENT:  EOMI. Anicteric sclerae. MMM Resp:  CTA bilaterally, no wheezing or rales. No accessory muscle use CV:  Regular  rhythm,  No edema GI:  Soft, Non distended, Non tender.  +Bowel sounds Neurologic:  Alert and oriented X 3, normal speech, Psych:   Good insight. Not anxious nor agitated Skin/extremities:  No rashes. No jaundice, Right lower leg erythema noted +, warm to touch +, mildly tender to touch +, R more swollen compared to left (slightly improved today) Reviewed most current lab test results and cultures  YES Reviewed most current radiology test results   YES Review and summation of old records today    NO Reviewed patient's current orders and MAR    YES 
PMH/ reviewed - no change compared to H&P 
________________________________________________________________________ Care Plan discussed with: 
  Comments Patient x Family RN x Care Manager Consultant Multidiciplinary team rounds were held today with , nursing, pharmacist and clinical coordinator. Patient's plan of care was discussed; medications were reviewed and discharge planning was addressed. ________________________________________________________________________ Total NON critical care TIME:  36   Minutes Total CRITICAL CARE TIME Spent:   Minutes non procedure based Comments >50% of visit spent in counseling and coordination of care    
________________________________________________________________________ Heather Lindsay MD  
 
 Procedures: see electronic medical records for all procedures/Xrays and details which were not copied into this note but were reviewed prior to creation of Plan. LABS: 
I reviewed today's most current labs and imaging studies. Pertinent labs include: 
Recent Labs 08/10/19 
0520 08/09/19 
1022 WBC 9.2 13.5* HGB 9.8* 10.5* HCT 31.8* 34.4*  
 278 Recent Labs 08/10/19 
0520 08/09/19 
1606 08/09/19 
1022   --  137  
K 4.2  --  4.2 *  --  107 CO2 22  --  22 *  --  218* BUN 28*  --  27* CREA 1.95*  --  2.01* CA 8.8  --  8.7 ALB  --   --  3.2* TBILI  --   --  0.6 SGOT  --   --  11* ALT  --   --  14 INR 2.1* 1.8*  --   
 
 
Signed: Reyna cMgregor MD

## 2019-08-10 NOTE — PROGRESS NOTES
Bedside and Verbal shift change report given to Praveena RN (oncoming nurse) by cherelle RN (offgoing nurse). Report included the following information SBAR, Kardex, MAR, Accordion, Recent Results and Cardiac Rhythm paced.

## 2019-08-10 NOTE — ROUTINE PROCESS
-Please complete MRI History and Safety Screening Form for this patient using KARDEX only under Orders Requiring a Screening Form: 
 

## 2019-08-10 NOTE — PROGRESS NOTES
Bedside and Verbal shift change report given to Britta Griffith (oncoming nurse) by Augie Guerrero (offgoing nurse). Report included the following information SBAR, Kardex, Intake/Output, MAR and Recent Results.

## 2019-08-10 NOTE — PROGRESS NOTES
Problem: Diabetes Self-Management Goal: *Disease process and treatment process Description Define diabetes and identify own type of diabetes; list 3 options for treating diabetes. Outcome: Progressing Towards Goal 
Goal: *Incorporating nutritional management into lifestyle Description Describe effect of type, amount and timing of food on blood glucose; list 3 methods for planning meals. Outcome: Progressing Towards Goal 
Goal: *Incorporating physical activity into lifestyle Description State effect of exercise on blood glucose levels. Outcome: Progressing Towards Goal 
Goal: *Developing strategies to promote health/change behavior Description Define the ABC's of diabetes; identify appropriate screenings, schedule and personal plan for screenings. Outcome: Progressing Towards Goal 
Goal: *Using medications safely Description State effect of diabetes medications on diabetes; name diabetes medication taking, action and side effects. Outcome: Progressing Towards Goal 
Goal: *Monitoring blood glucose, interpreting and using results Description Identify recommended blood glucose targets  and personal targets. Outcome: Progressing Towards Goal 
Goal: *Prevention, detection, treatment of acute complications Description List symptoms of hyper- and hypoglycemia; describe how to treat low blood sugar and actions for lowering  high blood glucose level. Outcome: Progressing Towards Goal 
Goal: *Prevention, detection and treatment of chronic complications Description Define the natural course of diabetes and describe the relationship of blood glucose levels to long term complications of diabetes. Outcome: Progressing Towards Goal 
Goal: *Developing strategies to address psychosocial issues Description Describe feelings about living with diabetes; identify support needed and support network Outcome: Progressing Towards Goal 
Goal: *Insulin pump training Outcome: Progressing Towards Goal 
 Goal: *Sick day guidelines Outcome: Progressing Towards Goal 
Goal: *Patient Specific Goal (EDIT GOAL, INSERT TEXT) Outcome: Progressing Towards Goal 
  
Problem: Patient Education: Go to Patient Education Activity Goal: Patient/Family Education Outcome: Progressing Towards Goal 
  
Problem: Falls - Risk of 
Goal: *Absence of Falls Description Document Brady Fells Fall Risk and appropriate interventions in the flowsheet. Outcome: Progressing Towards Goal 
Note:  
Fall Risk Interventions: 
Mobility Interventions: Patient to call before getting OOB, Utilize walker, cane, or other assistive device, Strengthening exercises (ROM-active/passive) Medication Interventions: Assess postural VS orthostatic hypotension, Patient to call before getting OOB, Teach patient to arise slowly, Utilize gait belt for transfers/ambulation Elimination Interventions: Patient to call for help with toileting needs Problem: Patient Education: Go to Patient Education Activity Goal: Patient/Family Education Outcome: Progressing Towards Goal

## 2019-08-11 LAB
ANION GAP SERPL CALC-SCNC: 7 MMOL/L (ref 5–15)
BUN SERPL-MCNC: 26 MG/DL (ref 6–20)
BUN/CREAT SERPL: 14 (ref 12–20)
CALCIUM SERPL-MCNC: 8.7 MG/DL (ref 8.5–10.1)
CHLORIDE SERPL-SCNC: 108 MMOL/L (ref 97–108)
CO2 SERPL-SCNC: 25 MMOL/L (ref 21–32)
CREAT SERPL-MCNC: 1.84 MG/DL (ref 0.55–1.02)
DATE LAST DOSE: ABNORMAL
ERYTHROCYTE [DISTWIDTH] IN BLOOD BY AUTOMATED COUNT: 13.6 % (ref 11.5–14.5)
GLUCOSE BLD STRIP.AUTO-MCNC: 113 MG/DL (ref 65–100)
GLUCOSE BLD STRIP.AUTO-MCNC: 132 MG/DL (ref 65–100)
GLUCOSE BLD STRIP.AUTO-MCNC: 147 MG/DL (ref 65–100)
GLUCOSE BLD STRIP.AUTO-MCNC: 187 MG/DL (ref 65–100)
GLUCOSE SERPL-MCNC: 111 MG/DL (ref 65–100)
HCT VFR BLD AUTO: 29.5 % (ref 35–47)
HGB BLD-MCNC: 9.3 G/DL (ref 11.5–16)
INR PPP: 2.5 (ref 0.9–1.1)
MCH RBC QN AUTO: 30.6 PG (ref 26–34)
MCHC RBC AUTO-ENTMCNC: 31.5 G/DL (ref 30–36.5)
MCV RBC AUTO: 97 FL (ref 80–99)
NRBC # BLD: 0 K/UL (ref 0–0.01)
NRBC BLD-RTO: 0 PER 100 WBC
PLATELET # BLD AUTO: 280 K/UL (ref 150–400)
PMV BLD AUTO: 9.9 FL (ref 8.9–12.9)
POTASSIUM SERPL-SCNC: 4 MMOL/L (ref 3.5–5.1)
PROTHROMBIN TIME: 24.7 SEC (ref 9–11.1)
RBC # BLD AUTO: 3.04 M/UL (ref 3.8–5.2)
REPORTED DOSE,DOSE: ABNORMAL UNITS
REPORTED DOSE/TIME,TMG: ABNORMAL
SERVICE CMNT-IMP: ABNORMAL
SODIUM SERPL-SCNC: 140 MMOL/L (ref 136–145)
VANCOMYCIN TROUGH SERPL-MCNC: 19.4 UG/ML (ref 5–10)
WBC # BLD AUTO: 5.7 K/UL (ref 3.6–11)

## 2019-08-11 PROCEDURE — 74011250636 HC RX REV CODE- 250/636: Performed by: INTERNAL MEDICINE

## 2019-08-11 PROCEDURE — 74011250637 HC RX REV CODE- 250/637: Performed by: HOSPITALIST

## 2019-08-11 PROCEDURE — 65660000000 HC RM CCU STEPDOWN

## 2019-08-11 PROCEDURE — 80202 ASSAY OF VANCOMYCIN: CPT

## 2019-08-11 PROCEDURE — 74011000258 HC RX REV CODE- 258: Performed by: HOSPITALIST

## 2019-08-11 PROCEDURE — 74011636637 HC RX REV CODE- 636/637: Performed by: HOSPITALIST

## 2019-08-11 PROCEDURE — 74011250637 HC RX REV CODE- 250/637: Performed by: INTERNAL MEDICINE

## 2019-08-11 PROCEDURE — 85610 PROTHROMBIN TIME: CPT

## 2019-08-11 PROCEDURE — 80048 BASIC METABOLIC PNL TOTAL CA: CPT

## 2019-08-11 PROCEDURE — 94760 N-INVAS EAR/PLS OXIMETRY 1: CPT

## 2019-08-11 PROCEDURE — 74011250636 HC RX REV CODE- 250/636: Performed by: HOSPITALIST

## 2019-08-11 PROCEDURE — 36415 COLL VENOUS BLD VENIPUNCTURE: CPT

## 2019-08-11 PROCEDURE — 82962 GLUCOSE BLOOD TEST: CPT

## 2019-08-11 PROCEDURE — 85027 COMPLETE CBC AUTOMATED: CPT

## 2019-08-11 RX ORDER — WARFARIN 1 MG/1
2 TABLET ORAL ONCE
Status: COMPLETED | OUTPATIENT
Start: 2019-08-11 | End: 2019-08-11

## 2019-08-11 RX ADMIN — CEFEPIME HYDROCHLORIDE 2 G: 2 INJECTION, POWDER, FOR SOLUTION INTRAVENOUS at 00:59

## 2019-08-11 RX ADMIN — VANCOMYCIN HYDROCHLORIDE 750 MG: 750 INJECTION, POWDER, LYOPHILIZED, FOR SOLUTION INTRAVENOUS at 14:02

## 2019-08-11 RX ADMIN — INSULIN LISPRO 2 UNITS: 100 INJECTION, SOLUTION INTRAVENOUS; SUBCUTANEOUS at 12:41

## 2019-08-11 RX ADMIN — Medication 10 ML: at 22:31

## 2019-08-11 RX ADMIN — HYDRALAZINE HYDROCHLORIDE 100 MG: 50 TABLET, FILM COATED ORAL at 22:30

## 2019-08-11 RX ADMIN — Medication 10 ML: at 05:17

## 2019-08-11 RX ADMIN — POTASSIUM CHLORIDE 10 MEQ: 750 TABLET, EXTENDED RELEASE ORAL at 08:16

## 2019-08-11 RX ADMIN — VITAMIN D, TAB 1000IU (100/BT) 5000 UNITS: 25 TAB at 08:15

## 2019-08-11 RX ADMIN — SERTRALINE HYDROCHLORIDE 75 MG: 50 TABLET ORAL at 08:55

## 2019-08-11 RX ADMIN — CEFEPIME HYDROCHLORIDE 2 G: 2 INJECTION, POWDER, FOR SOLUTION INTRAVENOUS at 12:41

## 2019-08-11 RX ADMIN — INSULIN LISPRO 2 UNITS: 100 INJECTION, SOLUTION INTRAVENOUS; SUBCUTANEOUS at 17:12

## 2019-08-11 RX ADMIN — CYANOCOBALAMIN TAB 500 MCG 1000 MCG: 500 TAB at 08:15

## 2019-08-11 RX ADMIN — INSULIN GLARGINE 20 UNITS: 100 INJECTION, SOLUTION SUBCUTANEOUS at 08:14

## 2019-08-11 RX ADMIN — BUSPIRONE HYDROCHLORIDE 10 MG: 5 TABLET ORAL at 08:15

## 2019-08-11 RX ADMIN — Medication 10 ML: at 05:18

## 2019-08-11 RX ADMIN — WARFARIN SODIUM 2 MG: 1 TABLET ORAL at 17:12

## 2019-08-11 RX ADMIN — DOXAZOSIN 4 MG: 2 TABLET ORAL at 22:30

## 2019-08-11 RX ADMIN — HYDRALAZINE HYDROCHLORIDE 100 MG: 50 TABLET, FILM COATED ORAL at 05:17

## 2019-08-11 RX ADMIN — HYDRALAZINE HYDROCHLORIDE 100 MG: 50 TABLET, FILM COATED ORAL at 14:02

## 2019-08-11 RX ADMIN — CLONIDINE HYDROCHLORIDE 0.6 MG: 0.1 TABLET ORAL at 22:30

## 2019-08-11 RX ADMIN — BUSPIRONE HYDROCHLORIDE 10 MG: 5 TABLET ORAL at 17:12

## 2019-08-11 RX ADMIN — Medication 10 ML: at 14:02

## 2019-08-11 RX ADMIN — METOPROLOL SUCCINATE 100 MG: 50 TABLET, EXTENDED RELEASE ORAL at 08:15

## 2019-08-11 RX ADMIN — BUMETANIDE 2 MG: 1 TABLET ORAL at 08:15

## 2019-08-11 NOTE — PHYSICIAN ADVISORY
Letter of Status Determination: Current Status INPATIENT is Appropriate Pt Name:  Laura Joseph MR#  [id number] [facility]#   [id number] Room and [address]  @ [facility] Hospitalization date  8/9/2019 10:09 AM  
Current Attending Physician  Hernán Haas MD  
Principal diagnosis  <principal problem not specified> Cellulitis of the rght leg with Sepsis Clinicals  60 y.o. y.o  female hospitalized with Sepsis, POA - resolved, lactate normalized, WBC normalized, afebrile now 
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 
  
Cont IV Antibiotics- IV cefepime and vancomycin Consult wound care Cont coumadin to keep INR 2-3, pharmacy to dose Cont other home meds Weightloss recommended 
   
[place] MCG criteria Does   apply cellulitis m-70 High-risk comorbid condition as indicated by 1 or more of the following:  
Very poorly controlled diabetes STATUS DETERMINATION  On the basis of clinical data, available documentaion, we believe that the current status of this patient as INPATIENT is Appropriate Additional comments Insurance  Payor: BLUE CROSS / Plan: [facility] PPO / Product Type: PPO / Insurance Information [employer]/[facility] PPO Phone:   
 Subscriber: Zafar Contreras Subscriber#: [id number] Group#: [id number] Precert#:   
  
 
  
 
 
Aristides Jasso MBA Physician [address] [place] [address] C: [phone] 8/11/2019 6:15 AM [id number] INPATIENT [address] [facility] [place] Brochure Payor: JULIENNE OSEGUERA / Plan: Bakari Quijano [id number] PPO / Product Type: Cristino Acuna /   Ethan Glover

## 2019-08-11 NOTE — PROGRESS NOTES
Problem: Diabetes Self-Management Goal: *Disease process and treatment process Description Define diabetes and identify own type of diabetes; list 3 options for treating diabetes. Outcome: Progressing Towards Goal 
Goal: *Incorporating nutritional management into lifestyle Description Describe effect of type, amount and timing of food on blood glucose; list 3 methods for planning meals. Outcome: Progressing Towards Goal 
Goal: *Incorporating physical activity into lifestyle Description State effect of exercise on blood glucose levels. Outcome: Progressing Towards Goal 
Goal: *Developing strategies to promote health/change behavior Description Define the ABC's of diabetes; identify appropriate screenings, schedule and personal plan for screenings. Outcome: Progressing Towards Goal 
Goal: *Using medications safely Description State effect of diabetes medications on diabetes; name diabetes medication taking, action and side effects. Outcome: Progressing Towards Goal 
Goal: *Monitoring blood glucose, interpreting and using results Description Identify recommended blood glucose targets  and personal targets. Outcome: Progressing Towards Goal 
Goal: *Prevention, detection, treatment of acute complications Description List symptoms of hyper- and hypoglycemia; describe how to treat low blood sugar and actions for lowering  high blood glucose level. Outcome: Progressing Towards Goal 
Goal: *Prevention, detection and treatment of chronic complications Description Define the natural course of diabetes and describe the relationship of blood glucose levels to long term complications of diabetes. Outcome: Progressing Towards Goal 
Goal: *Developing strategies to address psychosocial issues Description Describe feelings about living with diabetes; identify support needed and support network Outcome: Progressing Towards Goal 
Goal: *Insulin pump training Outcome: Progressing Towards Goal 
 Goal: *Sick day guidelines Outcome: Progressing Towards Goal 
Goal: *Patient Specific Goal (EDIT GOAL, INSERT TEXT) Outcome: Progressing Towards Goal 
  
Problem: Patient Education: Go to Patient Education Activity Goal: Patient/Family Education Outcome: Progressing Towards Goal 
  
Problem: Falls - Risk of 
Goal: *Absence of Falls Description Document [facility] Fall Risk and appropriate interventions in the flowsheet. Outcome: Progressing Towards Goal 
Note:  
Fall Risk Interventions: 
Mobility Interventions: Utilize walker, cane, or other assistive device Medication Interventions: Assess postural VS orthostatic hypotension, Teach patient to arise slowly Elimination Interventions: Patient to call for help with toileting needs Problem: Patient Education: Go to Patient Education Activity Goal: Patient/Family Education Outcome: Progressing Towards Goal

## 2019-08-11 NOTE — PROGRESS NOTES
Bedside and Verbal shift change report given to Zoey (oncoming nurse) by Mannie Johnson RN (offgoing nurse). Report included the following information SBAR, Kardex, Intake/Output, MAR, Recent Results and Cardiac Rhythm NSR/PACED.

## 2019-08-11 NOTE — PROGRESS NOTES
Problem: Diabetes Self-Management Goal: *Disease process and treatment process Description Define diabetes and identify own type of diabetes; list 3 options for treating diabetes. Outcome: Progressing Towards Goal 
Goal: *Incorporating nutritional management into lifestyle Description Describe effect of type, amount and timing of food on blood glucose; list 3 methods for planning meals. Outcome: Progressing Towards Goal 
Goal: *Incorporating physical activity into lifestyle Description State effect of exercise on blood glucose levels. Outcome: Progressing Towards Goal 
Goal: *Developing strategies to promote health/change behavior Description Define the ABC's of diabetes; identify appropriate screenings, schedule and personal plan for screenings. Outcome: Progressing Towards Goal 
Goal: *Using medications safely Description State effect of diabetes medications on diabetes; name diabetes medication taking, action and side effects. Outcome: Progressing Towards Goal 
Goal: *Monitoring blood glucose, interpreting and using results Description Identify recommended blood glucose targets  and personal targets. Outcome: Progressing Towards Goal 
Goal: *Prevention, detection, treatment of acute complications Description List symptoms of hyper- and hypoglycemia; describe how to treat low blood sugar and actions for lowering  high blood glucose level. Outcome: Progressing Towards Goal 
Goal: *Prevention, detection and treatment of chronic complications Description Define the natural course of diabetes and describe the relationship of blood glucose levels to long term complications of diabetes. Outcome: Progressing Towards Goal 
Goal: *Developing strategies to address psychosocial issues Description Describe feelings about living with diabetes; identify support needed and support network Outcome: Progressing Towards Goal 
Goal: *Insulin pump training Outcome: Progressing Towards Goal 
 Goal: *Sick day guidelines Outcome: Progressing Towards Goal 
Goal: *Patient Specific Goal (EDIT GOAL, INSERT TEXT) Outcome: Progressing Towards Goal 
  
Problem: Patient Education: Go to Patient Education Activity Goal: Patient/Family Education Outcome: Progressing Towards Goal 
  
Problem: Falls - Risk of 
Goal: *Absence of Falls Description Document [facility] Fall Risk and appropriate interventions in the flowsheet. Outcome: Progressing Towards Goal 
Note:  
Fall Risk Interventions: 
Mobility Interventions: Assess mobility with egress test, Communicate number of staff needed for ambulation/transfer, Patient to call before getting OOB, OT consult for ADLs, PT Consult for assist device competence, PT Consult for mobility concerns, Strengthening exercises (ROM-active/passive), Utilize walker, cane, or other assistive device, Utilize gait belt for transfers/ambulation Medication Interventions: Assess postural VS orthostatic hypotension, Patient to call before getting OOB, Evaluate medications/consider consulting pharmacy, Teach patient to arise slowly, Utilize gait belt for transfers/ambulation Elimination Interventions: Call light in reach, Elevated toilet seat, Patient to call for help with toileting needs, Toilet paper/wipes in reach, Stay With Me (per policy), Toileting schedule/hourly rounds Problem: Patient Education: Go to Patient Education Activity Goal: Patient/Family Education Outcome: Progressing Towards Goal

## 2019-08-11 NOTE — PROGRESS NOTES
Bedside and Verbal shift change report given to Praveena RN (oncoming nurse) by cherelle FAIRCHILD (offgoing nurse). Report included the following information SBAR, Kardex, Intake/Output, MAR, Accordion, Recent Results and Cardiac Rhythm paced.

## 2019-08-11 NOTE — PROGRESS NOTES
Hospitalist Progress Note NAME: Sona Carter :  1960 MRN:  [id number] Assessment / Plan: 
Sepsis, POA - resolved, lactate normalized, WBC normalized, remains afebrile now 
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 
  
Code: discussed, Full DVT prophylaxis: coumadin Surrogate decision maker:   Recommended Disposition: Home with family anticipated- pt walking at her baseline Subjective: Chief Complaint / Reason for Physician Visit :F/U R LE cellulitis, Chronic B/L LE lymphedema, Chronic B/L Planter/heel ulcers, DM 
\"I am better\". Discussed with RN events overnight. Review of Systems: 
Symptom Y/N Comments  Symptom Y/N Comments Fever/Chills n Resolved, Afebrile now in past 24 hrs  Chest Pain n   
Poor Appetite n   Edema y Chronic LE edema, R>L recent Cough n   Abdominal Pain n   
Sputum n   Joint Pain SOB/CHOW n   Pruritis/Rash y R LE  
Nausea/vomit n   Tolerating PT/OT y Diarrhea n   Tolerating Diet y Constipation    Other Could NOT obtain due to:   
 
Objective: VITALS:  
Last 24hrs VS reviewed since prior progress note. Most recent are: 
Patient Vitals for the past 24 hrs: 
 Temp Pulse Resp BP SpO2 08/11/19 0721 98.3 °F (36.8 °C) 61 18 134/48 100 % 08/11/19 0505 98 °F (36.7 °C) 60 16 151/65 96 % 08/11/19 0000  60     
08/10/19 2000  66     
08/10/19 1937 99.4 °F (37.4 °C) 66 18 164/71 97 % 08/10/19 1514 98.1 °F (36.7 °C) 66 18 141/44 98 % 08/10/19 1133 98.1 °F (36.7 °C) 62 17 139/64 98 % Intake/Output Summary (Last 24 hours) at 8/11/2019 1054 Last data filed at 8/11/2019 5145 Gross per 24 hour Intake 300 ml Output  Net 300 ml PHYSICAL EXAM: 
General: WD, WN. Alert, cooperative, no acute distress, Morbidly Obese +  
EENT:  EOMI. Anicteric sclerae. MMM Resp:  CTA bilaterally, no wheezing or rales. No accessory muscle use CV:  Regular  rhythm,  No edema GI:  Soft, Non distended, Non tender.  +Bowel sounds Neurologic:  Alert and oriented X 3, normal speech, Psych:   Good insight. Not anxious nor agitated Skin/extremities:  No rashes. No jaundice, Right lower leg erythema much improved today +, NOT warm to touch today, improve tenderness to touch today, R more swollen compared to left (much improved again today) Reviewed most current lab test results and cultures  YES Reviewed most current radiology test results   YES Review and summation of old records today    NO Reviewed patient's current orders and MAR    YES 
PMH/ reviewed - no change compared to H&P 
________________________________________________________________________ Care Plan discussed with: 
  Comments Patient x Family RN x Care Manager Consultant  x Dr Iris Shepard (Wound care doctor) yesterday Multidiciplinary team rounds were held today with , nursing, pharmacist and clinical coordinator. Patient's plan of care was discussed; medications were reviewed and discharge planning was addressed. ________________________________________________________________________ Total NON critical care TIME:  26   Minutes Total CRITICAL CARE TIME Spent:   Minutes non procedure based Comments >50% of visit spent in counseling and coordination of care    
________________________________________________________________________ Sarah Beth Silver MD  
 
Procedures: see electronic medical records for all procedures/Xrays and details which were not copied into this note but were reviewed prior to creation of Plan. LABS: 
I reviewed today's most current labs and imaging studies. Pertinent labs include: 
Recent Labs 08/11/19 
0508 08/10/19 
0520 08/09/19 
1022 WBC 5.7 9.2 13.5* HGB 9.3* 9.8* 10.5* HCT 29.5* 31.8* 34.4*  
 267 278 Recent Labs 08/11/19 
0508 08/10/19 
0520 08/09/19 
1606 08/09/19 
1022  138  --  137  
K 4.0 4.2  --  4.2  109*  --  107 CO2 25 22  --  22 * 151*  --  218* BUN 26* 28*  --  27* CREA 1.84* 1.95*  --  2.01* CA 8.7 8.8  --  8.7 ALB  --   --   --  3.2* TBILI  --   --   --  0.6 SGOT  --   --   --  11* ALT  --   --   --  14 INR 2.5* 2.1* 1.8*  --   
 
 
Signed: Sarah Beth Silver MD

## 2019-08-12 VITALS
WEIGHT: 260.14 LBS | BODY MASS INDEX: 38.53 KG/M2 | HEART RATE: 80 BPM | SYSTOLIC BLOOD PRESSURE: 134 MMHG | HEIGHT: 69 IN | OXYGEN SATURATION: 97 % | TEMPERATURE: 98 F | DIASTOLIC BLOOD PRESSURE: 66 MMHG | RESPIRATION RATE: 18 BRPM

## 2019-08-12 LAB
ANION GAP SERPL CALC-SCNC: 7 MMOL/L (ref 5–15)
BUN SERPL-MCNC: 25 MG/DL (ref 6–20)
BUN/CREAT SERPL: 14 (ref 12–20)
CALCIUM SERPL-MCNC: 9 MG/DL (ref 8.5–10.1)
CHLORIDE SERPL-SCNC: 105 MMOL/L (ref 97–108)
CO2 SERPL-SCNC: 25 MMOL/L (ref 21–32)
CREAT SERPL-MCNC: 1.81 MG/DL (ref 0.55–1.02)
GLUCOSE BLD STRIP.AUTO-MCNC: 124 MG/DL (ref 65–100)
GLUCOSE SERPL-MCNC: 110 MG/DL (ref 65–100)
POTASSIUM SERPL-SCNC: 3.7 MMOL/L (ref 3.5–5.1)
SERVICE CMNT-IMP: ABNORMAL
SODIUM SERPL-SCNC: 137 MMOL/L (ref 136–145)

## 2019-08-12 PROCEDURE — 74011636637 HC RX REV CODE- 636/637: Performed by: HOSPITALIST

## 2019-08-12 PROCEDURE — 74011000258 HC RX REV CODE- 258: Performed by: HOSPITALIST

## 2019-08-12 PROCEDURE — 94760 N-INVAS EAR/PLS OXIMETRY 1: CPT

## 2019-08-12 PROCEDURE — 74011250637 HC RX REV CODE- 250/637: Performed by: HOSPITALIST

## 2019-08-12 PROCEDURE — 74011250636 HC RX REV CODE- 250/636: Performed by: HOSPITALIST

## 2019-08-12 PROCEDURE — 36415 COLL VENOUS BLD VENIPUNCTURE: CPT

## 2019-08-12 PROCEDURE — 82962 GLUCOSE BLOOD TEST: CPT

## 2019-08-12 PROCEDURE — 80048 BASIC METABOLIC PNL TOTAL CA: CPT

## 2019-08-12 RX ORDER — CEPHALEXIN 500 MG/1
500 CAPSULE ORAL 4 TIMES DAILY
Qty: 28 CAP | Refills: 0 | Status: SHIPPED | OUTPATIENT
Start: 2019-08-12 | End: 2019-08-19

## 2019-08-12 RX ADMIN — POTASSIUM CHLORIDE 10 MEQ: 750 TABLET, EXTENDED RELEASE ORAL at 08:32

## 2019-08-12 RX ADMIN — BUMETANIDE 2 MG: 1 TABLET ORAL at 08:32

## 2019-08-12 RX ADMIN — CYANOCOBALAMIN TAB 500 MCG 1000 MCG: 500 TAB at 08:33

## 2019-08-12 RX ADMIN — INSULIN GLARGINE 20 UNITS: 100 INJECTION, SOLUTION SUBCUTANEOUS at 08:31

## 2019-08-12 RX ADMIN — HYDRALAZINE HYDROCHLORIDE 100 MG: 50 TABLET, FILM COATED ORAL at 05:43

## 2019-08-12 RX ADMIN — VITAMIN D, TAB 1000IU (100/BT) 5000 UNITS: 25 TAB at 08:32

## 2019-08-12 RX ADMIN — CEFEPIME HYDROCHLORIDE 2 G: 2 INJECTION, POWDER, FOR SOLUTION INTRAVENOUS at 01:55

## 2019-08-12 RX ADMIN — BUSPIRONE HYDROCHLORIDE 10 MG: 5 TABLET ORAL at 08:33

## 2019-08-12 RX ADMIN — Medication 10 ML: at 05:44

## 2019-08-12 RX ADMIN — METOPROLOL SUCCINATE 100 MG: 50 TABLET, EXTENDED RELEASE ORAL at 08:32

## 2019-08-12 RX ADMIN — SERTRALINE HYDROCHLORIDE 75 MG: 50 TABLET ORAL at 08:36

## 2019-08-12 RX ADMIN — Medication 10 ML: at 05:43

## 2019-08-12 NOTE — PROGRESS NOTES
Problem: Diabetes Self-Management Goal: *Disease process and treatment process Description Define diabetes and identify own type of diabetes; list 3 options for treating diabetes. Outcome: Progressing Towards Goal 
Goal: *Incorporating nutritional management into lifestyle Description Describe effect of type, amount and timing of food on blood glucose; list 3 methods for planning meals. Outcome: Progressing Towards Goal 
Goal: *Incorporating physical activity into lifestyle Description State effect of exercise on blood glucose levels. Outcome: Progressing Towards Goal 
Goal: *Developing strategies to promote health/change behavior Description Define the ABC's of diabetes; identify appropriate screenings, schedule and personal plan for screenings. Outcome: Progressing Towards Goal 
Goal: *Using medications safely Description State effect of diabetes medications on diabetes; name diabetes medication taking, action and side effects. Outcome: Progressing Towards Goal 
Goal: *Monitoring blood glucose, interpreting and using results Description Identify recommended blood glucose targets  and personal targets. Outcome: Progressing Towards Goal 
Goal: *Prevention, detection, treatment of acute complications Description List symptoms of hyper- and hypoglycemia; describe how to treat low blood sugar and actions for lowering  high blood glucose level. Outcome: Progressing Towards Goal 
Goal: *Prevention, detection and treatment of chronic complications Description Define the natural course of diabetes and describe the relationship of blood glucose levels to long term complications of diabetes. Outcome: Progressing Towards Goal 
Goal: *Developing strategies to address psychosocial issues Description Describe feelings about living with diabetes; identify support needed and support network Outcome: Progressing Towards Goal 
Goal: *Insulin pump training Outcome: Progressing Towards Goal 
 Goal: *Sick day guidelines Outcome: Progressing Towards Goal 
Goal: *Patient Specific Goal (EDIT GOAL, INSERT TEXT) Outcome: Progressing Towards Goal 
  
Problem: Patient Education: Go to Patient Education Activity Goal: Patient/Family Education Outcome: Progressing Towards Goal 
  
Problem: Falls - Risk of 
Goal: *Absence of Falls Description Document [facility] Fall Risk and appropriate interventions in the flowsheet. Outcome: Progressing Towards Goal 
Note:  
Fall Risk Interventions: 
Mobility Interventions: Assess mobility with egress test, Communicate number of staff needed for ambulation/transfer, OT consult for ADLs, Patient to call before getting OOB, PT Consult for mobility concerns, Utilize walker, cane, or other assistive device, PT Consult for assist device competence, Strengthening exercises (ROM-active/passive), Utilize gait belt for transfers/ambulation Medication Interventions: Assess postural VS orthostatic hypotension, Patient to call before getting OOB, Evaluate medications/consider consulting pharmacy, Utilize gait belt for transfers/ambulation, Teach patient to arise slowly Elimination Interventions: Call light in reach, Elevated toilet seat, Patient to call for help with toileting needs, Stay With Me (per policy), Toilet paper/wipes in reach, Toileting schedule/hourly rounds Problem: Patient Education: Go to Patient Education Activity Goal: Patient/Family Education Outcome: Progressing Towards Goal

## 2019-08-12 NOTE — PROGRESS NOTES
Bedside and Verbal shift change report given to Delgado (oncoming nurse) by Leonor Jaramillo (offgoing nurse). Report included the following information SBAR, Kardex, OR Summary, Procedure Summary, Intake/Output, MAR, Accordion, Recent Results and Cardiac Rhythm paced. Mandi Claudio

## 2019-08-12 NOTE — ROUTINE PROCESS
The following appointments have been successfully scheduled: 
 
Date/time Thursday, August 15, 2019 01:45 PM 
Patient  Wynne Ganser 1960 (43HI F) #[id number] [facility] App[facility] type Transitional Care Provider Kia Soriano

## 2019-08-12 NOTE — DISCHARGE SUMMARY
Hospitalist Discharge Summary Patient ID: 
Sona Carter [id number] 
58 y.o. 
1960 8/9/2019 PCP on record: Gabrielle Garcia MD 
 
Admit date: 8/9/2019 Discharge date and time: 8/12/2019 DISCHARGE DIAGNOSIS: 
 
Sepsis, POA - resolved, lactate normalized, WBC normalized, remains afebrile now 
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 
None Excerpted HPI from H&P of Torito Colindres MD: 
Ankit.[age] y.o.  female sent from wound care center for right lower leg cellulitis and rule out DVT. Patient is DM and has chronic b/l foot ulcerations and sometimes ulceration on breasts and followed by wound care center. She had routine visit and reported not feeling well for few days with low grade temp and redness in right lower leg. 
  
We were asked to admit for work up and evaluation of the above problems. \" 
 
______________________________________________________________________ DISCHARGE SUMMARY/HOSPITAL COURSE:  for full details see H&P, daily progress notes, labs, consult notes. Sepsis, POA - resolved, lactate normalized, WBC normalized, remains afebrile now 
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 
  
Cont IV Antibiotics- IV cefepime and vancomycin for another 24 hrs then plan to change to PO on discharge Consult wound care Cont coumadin to keep INR 2-3, pharmacy to dose Cont other home meds Weightloss recommended 
  
Code: discussed, Full 
 
 
 
_______________________________________________________________________ Patient seen and examined by me on discharge day. Pertinent Findings: 
Gen:    Not in distress Chest: Clear lungs CVS:   Regular rhythm. No edema Abd:  Soft, not distended, not tender Neuro:  Alert, oriented x 3 Skin/extremities: R LE redness almost resolved , no tenderness & warmthj on touch today 
_______________________________________________________________________ DISCHARGE MEDICATIONS:  
Current Discharge Medication List  
  
START taking these medications Details  
cephALEXin (KEFLEX) 500 mg capsule Take 1 Cap by mouth four (4) times daily for 7 days. Qty: 28 Cap, Refills: 0 CONTINUE these medications which have NOT CHANGED Details  
bumetanide (BUMEX) 1 mg tablet Take 2 mg by mouth daily. cloNIDine HCl (CATAPRES) 0.3 mg tablet Take 0.6 mg by mouth nightly. !! warfarin (COUMADIN) 4 mg tablet Take 4 mg by mouth every Monday and Thursday. Pt takes 4 mg Mon and Thurs and 2 mg the other 5 days a week  
  
!! warfarin (COUMADIN) 4 mg tablet Take 2 mg by mouth five (5) days a week. Pt takes 4 mg Mon and Thurs and 2 mg the other 5 days a week  
  
metoprolol succinate (TOPROL-XL) 100 mg tablet TAKE TWO TABLETS BY MOUTH DAILY Qty: 60 Tab, Refills: 9  
  
hydrALAZINE (APRESOLINE) 100 mg tablet TAKE ONE TABLET BY MOUTH THREE TIMES A DAY Qty: 90 Tab, Refills: 1  
  
doxazosin (CARDURA) 4 mg tablet TAKE ONE TABLET BY MOUTH ONCE NIGHTLY Qty: 30 Tab, Refills: 10  
 Associated Diagnoses: Essential hypertension  
  
busPIRone (BUSPAR) 10 mg tablet TAKE ONE TABLET BY MOUTH TWICE A DAY Qty: 60 Tab, Refills: 10  
 Associated Diagnoses: Anxiety as acute reaction to gross stress  
  
potassium chloride SR (KLOR-CON 10) 10 mEq tablet TAKE ONE TABLET BY MOUTH DAILY Qty: 90 Tab, Refills: 3  
  
sertraline (ZOLOFT) 50 mg tablet TAKE ONE AND ONE-HALF (1 & 1/2) TABLET BY MOUTH DAILY Qty: 45 Tab, Refills: 3 Associated Diagnoses: Anxiety as acute reaction to gross stress  
  
insulin glargine (LANTUS U-100 INSULIN) 100 unit/mL injection 20 Units by SubCUTAneous route daily. Qty: 10 mL, Refills: 5 Associated Diagnoses: Uncontrolled type 2 diabetes mellitus with stage 4 chronic kidney disease, with long-term current use of insulin ([address] metolazone (ZAROXOLYN) 5 mg tablet Take 1 Tab by mouth daily as needed (take if develop increased LE edema, weight gain > 5 pounds. ). Qty: 30 Tab, Refills: 1 cholecalciferol, VITAMIN D3, (VITAMIN D3) 5,000 unit tab tablet Take 5,000 Units by mouth daily. vitamin A 8,000 unit capsule Take 8,000 Units by mouth daily. ACETAMINOPHEN/DIPHENHYDRAMINE (TYLENOL PM PO) Take 3 Tabs by mouth nightly as needed. insulin lispro (HUMALOG) 100 unit/mL kwikpen 2 units with dinner for sugars over 200 Qty: 1 Package, Refills: 0  
  
cyanocobalamin (VITAMIN B-12) 1,000 mcg sublingual tablet Take 1,000 mcg by mouth daily. !! - Potential duplicate medications found. Please discuss with provider. Patient Follow Up Instructions: Activity: Activity as tolerated Diet: Cardiac Diet, Diabetic Diet and Low fat, Low cholesterol Wound Care: Keep wound clean and dry Follow-up with Dr Gustavo Waters (Wound care clinic) in 3 days. Follow-up Information Follow up With Specialties Details Why Contact Info Jai Petty MD Internal Medicine   [address] [address] [phone] ________________________________________________________________ Risk of deterioration: Low 
 
Condition at Discharge:  Stable 
__________________________________________________________________ Disposition Home with family, no needs 
 
____________________________________________________________________ Code Status: Full Code 
___________________________________________________________________ Total time in minutes spent coordinating this discharge (includes going over instructions, follow-up, prescriptions, and preparing report for sign off to her PCP) :  36 minutes Signed: 
Dennis Guevara MD

## 2019-08-12 NOTE — DISCHARGE INSTRUCTIONS
HOSPITALIST DISCHARGE INSTRUCTIONS    NAME: Megan Luevano   :  1960   MRN:  [id number]     Date/Time:  2019 9:37 AM    ADMIT DATE: 2019     DISCHARGE DATE: 2019     DISCHARGE DIAGNOSIS:  Sepsis, POA - resolved, lactate normalized, WBC normalized, remains afebrile now  due to Right lower leg and foot cellulitis, POA- improved, cont PO Abx now x 7 more days  Chronic bilateral plantar foot ulcers, POA- sees Dr Parks [facility]) & Dr Brennon Estevez (podiatrist), Cont OP followup  Hx afib s/p ablation and pacemaker, POA  Hx of chronic diastolic chf EF 88-62% 1453 with diastolic dysfunction  IDDM type 2, POA  A1c 7.2   CKD stag 3-4- Cr at baseline or close to it  Morbid Obesity POA- use to be 700 pounds in past as per pt    Active Problems:    Sepsis ([address] (2019)      Cellulitis and abscess of right leg (2019)         MEDICATIONS:  As per medication reconciliation  list  · It is important that you take the medication exactly as they are prescribed. · Keep your medication in the bottles provided by the pharmacist and keep a list of the medication names, dosages, and times to be taken in your wallet. · Do not take other medications without consulting your doctor. Pain Management: per above medications    What to do at Home    Recommended diet:  Cardiac Diet, Diabetic Diet and Low fat, Low cholesterol    Recommended activity: Activity as tolerated    If you have questions regarding the hospital related prescriptions or hospital related issues please call [facility] [place] at .     If you experience any of the following symptoms then please call your primary care physician or return to the emergency room if you cannot get hold of your doctor:  Fever, chills, nausea, vomiting, diarrhea, change in mentation, falling, bleeding, shortness of breath,     Follow Up:  Dr. Mary Jane Solorio MD  you are to call and set up an appointment to see them in 7-10 days.  Dr Tawanda Clarke (Wound care clinic) in 2-3 days for follow up care    Information obtained by :  I understand that if any problems occur once I am at home I am to contact my physician. I understand and acknowledge receipt of the instructions indicated above.                                                                                                                                            Physician's or R.N.'s Signature                                                                  Date/Time                                                                                                                                              Patient or Representative Signature                                                          Date/Time

## 2019-08-13 ENCOUNTER — HOSPITAL ENCOUNTER (OUTPATIENT)
Dept: WOUND CARE | Age: 59
Discharge: HOME | End: 2019-08-13
Payer: COMMERCIAL

## 2019-08-13 VITALS
HEART RATE: 68 BPM | RESPIRATION RATE: 16 BRPM | SYSTOLIC BLOOD PRESSURE: 149 MMHG | DIASTOLIC BLOOD PRESSURE: 69 MMHG | TEMPERATURE: 96.9 F

## 2019-08-13 DIAGNOSIS — L03.115 CELLULITIS OF RIGHT LOWER EXTREMITY: ICD-10-CM

## 2019-08-13 PROCEDURE — 74011000250 HC RX REV CODE- 250: Performed by: PODIATRIST

## 2019-08-13 PROCEDURE — 97597 DBRDMT OPN WND 1ST 20 CM/<: CPT

## 2019-08-13 RX ADMIN — Medication: at 15:19

## 2019-08-13 NOTE — PROGRESS NOTES
Patient presents to wound clinic for: Charlotte Chairez is a 60 y.o. female who presents for wound care of bilateral plantar foot ulcers. Patient currently being treated by Dr. Artur Albright for a wound of the left breast, and was referred to me for further offloading and wound care recommendations of the bilateral heel ulcers. The ulcer of the plantar left heel occurred first and patient was placed in a heel offloading shoe. She then subsequently developed an ulceration of the plantar 5th met base of the right foot. She notes that the wounds are painful when ambulating. She has been wearing her sneakers with her offloading pads. She was placed in a TCC last week on Tuesday and had to have it removed last Friday due to a low grade fever and concern for infection of wound. Patient was in the hospital over the weekend due to RLE cellulitis. Patient was discharged from [address] [place] [address] yesterday due on keflex (7 days). Patient notes that she is behind on house work from being in the hospital over the weekend, and states that she cannot be in a cast for the next week due to being behind on housework as it is difficult to ambulate in the cast.  Denies f/c/n/v/d. Pertinent Medical History: 
Past Medical History:  
Diagnosis Date  Acquired lymphedema Right arm  Arrhythmia  Asthma  Atrial fibrillation ([facility] Dr. Francia Finch and Dr. Karishma [facility])  Cancer ([facility] bilateral breast  
 Chronic kidney disease  Diabetes mellitus type II   
 Dizziness  Essential hypertension  Hyperlipidemia  Hypertension  Long term (current) use of anticoagulants  Morbid obesity ([facility] 3/14/2012  Osteoarthritis  Pacemaker  Sick sinus syndrome ([facility] Past Surgical History:  
Procedure Laterality Date  ABDOMEN SURGERY PROC UNLISTED    
 gastric bypass  GASTRIC BYPASS,OBESE<150CM SAW-EN-Y  7/08  
 [place]  HX AFIB ABLATION    
 HX CARPAL TUNNEL RELEASE [address] [facility] [address] RIGHT MODIFIED RADICAL   
 HX MOHS PROCEDURES  1995  
 right  HX ORTHOPAEDIC    
 ight knee surgery  HX PACEMAKER    
 IR INSERT TUNL CVC W PORT OVER 5 YEARS    
 LAMINECTOMY,CERVICAL  1994  
 NEEDLE BIOPSY LIVER,W OTHR [address]  8.21.07  
 [place] [address] AND 1994  [place] TRABECULOPLASTY BY LASER SURGERY    
 US GUIDE ASP OVARIAN CYST ABD APPROACH  8.21.07  
 RIGHT Prior to Admission medications Medication Sig Start Date End Date Taking? Authorizing Provider  
cephALEXin (KEFLEX) 500 mg capsule Take 1 Cap by mouth four (4) times daily for 7 days. 8/12/19 8/19/19  Araceli Estrada MD  
bumetanide (BUMEX) 1 mg tablet Take 2 mg by mouth daily. Provider, Historical  
cloNIDine HCl (CATAPRES) 0.3 mg tablet Take 0.6 mg by mouth nightly. Provider, Historical  
warfarin (COUMADIN) 4 mg tablet Take 4 mg by mouth every Monday and Thursday. Pt takes 4 mg Mon and Thurs and 2 mg the other 5 days a week    Provider, Historical  
warfarin (COUMADIN) 4 mg tablet Take 2 mg by mouth five (5) days a week.  Pt takes 4 mg Mon and Thurs and 2 mg the other 5 days a week    Provider, Historical  
metoprolol succinate (TOPROL-XL) 100 mg tablet TAKE TWO TABLETS BY MOUTH DAILY 7/12/19   Bruce Vang MD  
hydrALAZINE (APRESOLINE) 100 mg tablet TAKE ONE TABLET BY MOUTH THREE TIMES A DAY 6/24/19   Hong Chandler NP  
doxazosin (CARDURA) 4 mg tablet TAKE ONE TABLET BY MOUTH ONCE NIGHTLY 6/14/19   Bruce Vang MD  
busPIRone (BUSPAR) 10 mg tablet TAKE ONE TABLET BY MOUTH TWICE A DAY 5/24/19   Bruce Vang MD  
potassium chloride SR (KLOR-CON 10) 10 mEq tablet TAKE ONE TABLET BY MOUTH DAILY 4/22/19   Ama Herrera MD  
sertraline (ZOLOFT) 50 mg tablet TAKE ONE AND ONE-HALF (1 & 1/2) TABLET BY MOUTH DAILY 4/17/19   Bruce Vang MD  
 insulin glargine (LANTUS U-100 INSULIN) 100 unit/mL injection 20 Units by SubCUTAneous route daily. 11/13/18   Giuliano Asif MD  
metolazone (ZAROXOLYN) 5 mg tablet Take 1 Tab by mouth daily as needed (take if develop increased LE edema, weight gain > 5 pounds. ). 4/10/14   Cathy King NP  
cholecalciferol, VITAMIN D3, (VITAMIN D3) 5,000 unit tab tablet Take 5,000 Units by mouth daily. Provider, Historical  
vitamin A 8,000 unit capsule Take 8,000 Units by mouth daily. Provider, Historical  
ACETAMINOPHEN/DIPHENHYDRAMINE (TYLENOL PM PO) Take 3 Tabs by mouth nightly as needed. Provider, Historical  
insulin lispro (HUMALOG) 100 unit/mL kwikpen 2 units with dinner for sugars over 200 1/3/14   Giuliano Asif MD  
cyanocobalamin (VITAMIN B-12) 1,000 mcg sublingual tablet Take 1,000 mcg by mouth daily. Provider, Historical  
 
Allergies Allergen Reactions  Ace Inhibitors Cough  Amlodipine Swelling  Avapro [Irbesartan] Unable to Obtain  Bactrim [Sulfamethoxazole-Trimethoprim] Other (comments) Sore mouth  Captopril Cough  Carvedilol Diarrhea  Contrast Agent [Iodine] Itching [address]  Morphine Nausea and Vomiting and Swelling  Penicillins Hives  Pravachol [Pravastatin] Nausea Only  Seafood [Shellfish Containing Products] Hives  Singulair [Montelukast] Other (comments)  
  palpitations Family History Problem Relation Age of Onset  Cancer Mother  Heart Disease Mother  Alcohol abuse Father  Diabetes Brother  Hypertension Brother Social History Socioeconomic History  Marital status:  Spouse name: Not on file  Number of children: Not on file  Years of education: Not on file  Highest education level: Not on file Occupational History  Not on file Social Needs  Financial resource strain: Not on file  Food insecurity:  
  Worry: Not on file Inability: Not on file  Transportation needs:  
  Medical: Not on file Non-medical: Not on file Tobacco Use  Smoking status: Never Smoker  Smokeless tobacco: Never Used Substance and Sexual Activity  Alcohol use: Yes Comment: rare  Drug use: No  
 Sexual activity: Not on file Lifestyle  Physical activity:  
  Days per week: Not on file Minutes per session: Not on file  Stress: Not on file Relationships  Social connections:  
  Talks on phone: Not on file Gets together: Not on file Attends Ca[employer] service: Not on file Active member of club or organization: Not on file Attends meetings of clubs or organizations: Not on file Relationship status: Not on file  Intimate partner violence:  
  Fear of current or ex partner: Not on file Emotionally abused: Not on file Physically abused: Not on file Forced sexual activity: Not on file Other Topics Concern  Not on file Social History Narrative  Not on file Vitals:  
 08/13/19 1519 BP: 149/69 Pulse: 68 Resp: 16 Temp: 96.9 °F (36.1 °C) Review of Systems: 
 
Gen: No fever, chills, malaise, weight loss/gain. Heent: No headache, rhinorrhea, epistaxis, ear pain, hearing loss, sinus pain, neck pain/stiffness, sore throat. Heart: No chest pain, palpitations, CHOW, pnd, or orthopnea. Resp: No cough, hemoptysis, wheezing and shortness of breath. GI: No nausea, vomiting, diarrhea, constipation, melena or hematochezia. : No urinary obstruction, dysuria or hematuria. Derm: see below Musc/skeletal: no bone or joint complains. Vasc: No edema, cyanosis or claudication. Endo: No heat/cold intolerance, no polyuria,polydipsia or polyphagia. Neuro: No unilateral weakness, numbness, tingling. No seizures. Heme: No easy bruising or bleeding. Wound Description: 
Refer to nursing notes. Debridement: required today for wound healing Ulcer Debridement Left plantar foot wound Character of Ulcer: Deteriorated Indication for Debridement: Slough, Exudate, Abnormal wound edge and Abnormal Wound base Pre debridement measurements: 1.5 cm x 1.7 cm x 0.1 cm Risks of procedure were discussed with patient. Consent has been signed. Anesthetic: Lidocaine 4% topical cream  
 
Level of Debridement: Subctaneous Tissue Tissue and/or Material removed: Exudate, Fibrin/ Slough and Subcutaneous Type of Tissue: Non- Viable Pre-debridement severity: Fat Layer Exposed Post debridement severity: Fat Layer exposed Instrument(s) used: Scalpel Bleeding controlled with: Pressure Estimated blood loss: Minimal 
 
Pre debridement measurements: 1.7 cm x 2.0 cm x 0.2 cm Post procedural pain: mild Patient tolerated procedure well. Right plantar foot wound Character of Ulcer: Unchanged Indication for Debridement: Slough, Exudate, Abnormal wound edge and Abnormal Wound base Pre debridement measurements: 1 cm x 0.5 cm x 0.1 cm Risks of procedure were discussed with patient. Consent has been signed. Anesthetic: Lidocaine 4% topical cream  
 
Level of Debridement: Subctaneous Tissue Tissue and/or Material removed: Callus, Exudate and [address] Type of Tissue: Non- Viable Pre-debridement severity: Fat Layer Exposed Post debridement severity: Fat Layer exposed Instrument(s) used: Scalpel Bleeding controlled with: Pressure Estimated blood loss: Minimal 
 
Pre debridement measurements: 1.1 cm x 0.6 cm x 0.2 cm Post procedural pain: none Patient tolerated procedure well. Infection: no 
 
Edema: mild edema Lower Extremity Circulation Reviewed patient's recent ABIs. Shows decrease from when the  
 
Offloading: Offloading padding increased to both patient's shoes Assessment: 1. Diabetic plantar heel ulcer left foot 2. Diabetic ulcer right foot-plantar 5th met base Plan: -Patient seen and evaluated as noted above. 
-Wound debridement performed. 
-Patient needs further offloading. However, patient requesting to not be placed in TCC to LLE today as she needs to get caught up on housework after her recent hospitalization. Will increase padding to the patient's left shoe. (Padding to right shoe was increased at last visit.) Patient to see Dr. Artur Albright for evaluation of breast wound next week. In 2 weeks when she follows up with me, we will likely reapply the TCC to the LLE if significant progress is not made. Patient is agreeable to this plan so that she can make sure she is able to adjust her household duties to better accomodate for the next TCC application. Patient will benefit from custom molded diabetic shoes in the long-term once the wounds are healed. -Nursing to apply aquacel Ag, exudry, gauze to both foot wounds. Pt has nursing visit for dressing change on Friday and next Tuesday. She has an appointment with Dr. Artur Albright on 8/23. 
-Follow-up with me in 2 weeks.

## 2019-08-13 NOTE — WOUND CARE
08/13/19 1521 Wound Breast Left Date First Assessed/Time First Assessed: 06/07/19 0834   Location: Breast  Wound Location Orientation: Left Dressing Status Removed Dressing Type Gauze Condition of Base Pink Condition of Edges Open Drainage Amount None Wound Odor None Sandra-wound Assessment Intact Cleansing and Cleansing Agents  Normal saline Dressing Type Applied Vacuum dressing 
(snap vac @125 mmHg) Wound Foot Left;Plantar Date First Assessed/Time First Assessed: 06/21/19 0857   Present on Hospital Admission: Yes  Primary Wound Type: Diabetic  Location: Foot  Wound Location Orientation: Left;Plantar Dressing Status Removed Dressing Type Gauze;Gauze wrap (arnie); Special tape (comment) Wound Length (cm) 1.5 cm Wound Width (cm) 1.7 cm Wound Depth (cm) 0.1 cm Wound Volume (cm^3) 0.26 cm^3 Condition of Base Slough;Pink Condition of Edges Calloused; Open Drainage Amount Moderate Drainage Color Serosanguinous Wound Odor None Sandra-wound Assessment Intact Cleansing and Cleansing Agents  Normal saline Wound Foot Right;Plantar;Lateral 07/30/19 Date First Assessed/Time First Assessed: 07/30/19 1034   Present on Hospital Admission: Yes  Wound Approximate Age at First Assessment (Weeks): 1 weeks  Primary Wound Type: Diabetic Ulcer  Location: Foot  Wound Location Orientation: Right;Plantar;Late. .. Dressing Status Removed Dressing Type Gauze;Gauze wrap (arnie); Special tape (comment) Wound Length (cm) 1 cm Wound Width (cm) 0.5 cm Wound Depth (cm) 0.1 cm Wound Volume (cm^3) 0.05 cm^3 Condition of Base Pink Condition of Edges Calloused Drainage Amount Moderate Drainage Color Serosanguinous Wound Odor None Sandra-wound Assessment Intact Cleansing and Cleansing Agents  Normal saline Visit Vitals /69 Pulse 68 Temp 96.9 °F (36.1 °C) Resp 16 LLE Peripheral Vascular Capillary Refill: Less than/equal to 3 seconds (08/13/19 1521) Color: Appropriate for race (08/13/19 1521) Temperature: Warm (08/13/19 1521) Sensation: Present (08/13/19 1521) Pedal Pulse: Palpable (08/13/19 1521) RLE Peripheral Vascular Capillary Refill: Less than/equal to 3 seconds (08/13/19 1521) Color: Appropriate for race (08/13/19 1521) Temperature: Warm (08/13/19 1521) Sensation: Present (08/13/19 1521) Pedal Pulse: Palpable (08/13/19 1521)

## 2019-08-13 NOTE — WOUND CARE
08/13/19 1559 Wound Foot Left;Plantar Date First Assessed/Time First Assessed: 06/21/19 0857   Present on Hospital Admission: Yes  Primary Wound Type: Diabetic  Location: Foot  Wound Location Orientation: Left;Plantar Dressing Type Applied Silver products; Alginate; Absorptive;Gauze wrap (arnie); Special tape (comment) (Aquacel Ag) Wound Foot Right;Plantar;Lateral 07/30/19 Date First Assessed/Time First Assessed: 07/30/19 1034   Present on Hospital Admission: Yes  Wound Approximate Age at First Assessment (Weeks): 1 weeks  Primary Wound Type: Diabetic Ulcer  Location: Foot  Wound Location Orientation: Right;Plantar;Late. .. Dressing Type Applied Absorptive; Alginate;Gauze wrap (arnie); Silver products; Special tape (comment) (Aquacel Ag) Patient was discharged with Self to home and was ambulatory. In stable condition with c/o pain:_0_/10_

## 2019-08-14 PROBLEM — L03.115 CELLULITIS AND ABSCESS OF RIGHT LEG: Status: RESOLVED | Noted: 2019-08-09 | Resolved: 2019-08-14

## 2019-08-14 PROBLEM — A41.9 SEPSIS (HCC): Status: RESOLVED | Noted: 2019-08-09 | Resolved: 2019-08-14

## 2019-08-14 PROBLEM — L02.415 CELLULITIS AND ABSCESS OF RIGHT LEG: Status: RESOLVED | Noted: 2019-08-09 | Resolved: 2019-08-14

## 2019-08-15 ENCOUNTER — OFFICE VISIT (OUTPATIENT)
Dept: INTERNAL MEDICINE CLINIC | Facility: CLINIC | Age: 59
End: 2019-08-15

## 2019-08-15 VITALS
HEIGHT: 69 IN | OXYGEN SATURATION: 96 % | BODY MASS INDEX: 38.45 KG/M2 | HEART RATE: 81 BPM | RESPIRATION RATE: 16 BRPM | SYSTOLIC BLOOD PRESSURE: 131 MMHG | TEMPERATURE: 97.8 F | DIASTOLIC BLOOD PRESSURE: 67 MMHG | WEIGHT: 259.6 LBS

## 2019-08-15 DIAGNOSIS — L03.115 CELLULITIS OF LEG, RIGHT: Primary | ICD-10-CM

## 2019-08-15 DIAGNOSIS — E11.621 DIABETIC ULCER OF LEFT HEEL ASSOCIATED WITH TYPE 2 DIABETES MELLITUS, WITH FAT LAYER EXPOSED (HCC): ICD-10-CM

## 2019-08-15 DIAGNOSIS — L97.412 DIABETIC ULCER OF RIGHT MIDFOOT ASSOCIATED WITH TYPE 2 DIABETES MELLITUS, WITH FAT LAYER EXPOSED (HCC): ICD-10-CM

## 2019-08-15 DIAGNOSIS — E11.621 DIABETIC ULCER OF RIGHT MIDFOOT ASSOCIATED WITH TYPE 2 DIABETES MELLITUS, WITH FAT LAYER EXPOSED (HCC): ICD-10-CM

## 2019-08-15 DIAGNOSIS — I48.0 PAF (PAROXYSMAL ATRIAL FIBRILLATION) (HCC): ICD-10-CM

## 2019-08-15 DIAGNOSIS — L97.422 DIABETIC ULCER OF LEFT HEEL ASSOCIATED WITH TYPE 2 DIABETES MELLITUS, WITH FAT LAYER EXPOSED (HCC): ICD-10-CM

## 2019-08-15 LAB
INR BLD: 3.7
PT POC: NORMAL
VALID INTERNAL CONTROL?: YES

## 2019-08-15 RX ORDER — WARFARIN 4 MG/1
TABLET ORAL
Qty: 60 TAB | Refills: 5
Start: 2019-08-15 | End: 2019-11-12

## 2019-08-15 NOTE — PROGRESS NOTES
HISTORY OF PRESENT ILLNESS Blaine Bhatia is a 60 y.o. female. HPI  Ms. Brandon Huston is a 60y.o. year old female, she is seen today for Transition of Care services following a hospital discharge for cellulites, right leg and foot and sepsis  on August 12. She was admitted form the ED on August 9 after an evaluation at wound clinic revealed erythema and swelling of right leg and foot. She complained of fever and chills. She has had non-healing diabetic ulcers of right and left foot. She also has ulceration of left breast.   
 
She was treated with cefipime and vancomycin and discharged with 7 day course of cephalexin. Venous doppler on the right leg was negative for DVT. WBC was 13.5, creat 2.01, glucose 218. XR left foot and right tib/fib showed nothing to suggest osteomyelitis. CT of Left LE showed a plantar ulcer, but no osteomyelitis. She was seen at wound clinic yesterday. Heel wounds were derided. Dr Delroy Rg recommended casting, but she declined. blood sugars have not been checked since she has been home. Appetite is okay. Leg swelling may be a little bit better, No constipation. Breast wound is about the same, Was not addressed in the hospital.  
 
Patient Active Problem List  
Diagnosis Code  History of breast cancer Z85.3  Asthma J45.909  Fe deficiency anemia D50.9  Status post ablation of atrial fibrillation Z98.890, Z86.79  
 Sick sinus syndrome ([facility]) I49.5  S/P cardiac pacemaker procedure Z95.0  Long term (current) use of anticoagulants Z79.01  
 Mixed hyperlipidemia E78.2  CKD (chronic kidney disease), stage IV ([facility]) N18.4  Systolic murmur F37.5  Essential hypertension I10  
 PAF (paroxysmal atrial fibrillation) ([facility]) I48.0  Anemia in stage 4 chronic kidney disease ([facility]) N18.4, D63.1  Controlled type 2 diabetes mellitus with stage 4 chronic kidney disease, with long-term current use of insulin (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, associated with type 2 diabetes mellitus ([facility] K65.4075  Diabetic polyneuropathy associated with type 2 diabetes mellitus ([facility]) E11.42  Venous stasis ulcer of right lower leg with edema of right lower leg ([facility]) I83.019, I83.891, L97.919, R60.9  Laceration of right leg excluding thigh, subsequent encounter S81.811D  Open breast wound, left, initial encounter S21.002A  Diabetic ulcer of left heel associated with type 2 diabetes mellitus, with fat layer exposed ([facility] E11.621, Z75.286  
 Diabetic ulcer of right midfoot associated with type 2 diabetes mellitus, with fat layer exposed ([facility] E11.621, G00.006  Cellulitis of right lower extremity L03.115 Past Medical History:  
Diagnosis Date  Acquired lymphedema Right arm  Arrhythmia  Asthma  Atrial fibrillation ([facility] Dr. Diane Tucker and Dr. Norman Gist [facility])  Cancer ([facility] bilateral breast  
 Chronic kidney disease  Diabetes mellitus type II   
 Dizziness  Essential hypertension  Hyperlipidemia  Hypertension  Long term (current) use of anticoagulants  Morbid obesity ([facility] 3/14/2012  Osteoarthritis  Pacemaker  Sick sinus syndrome ([facility] Past Surgical History:  
Procedure Laterality Date  ABDOMEN SURGERY PROC UNLISTED    
 gastric bypass  GASTRIC BYPASS,OBESE<150CM SAW-EN-Y  7/08  
 [facility]  HX AFIB ABLATION    
 HX CARPAL TUNNEL RELEASE [address] [facility] [address] RIGHT MODIFIED RADICAL   
 HX MOHS PROCEDURES  1995  
 right  HX ORTHOPAEDIC    
 ight knee surgery  HX PACEMAKER    
 IR INSERT TUNL CVC W PORT OVER 5 YEARS    
 LAMINECTOMY,CERVICAL  1994  
 NEEDLE BIOPSY LIVER,W OTHR [address]  8.21.07  
 [address] AND 1994  IA TRABECULOPLASTY BY LASER SURGERY    
 US GUIDE ASP OVARIAN CYST ABD APPROACH  8.21.07  
 RIGHT Social History Socioeconomic History  Marital status:  Spouse name: Not on file  Number of children: Not on file  Years of education: Not on file  Highest education level: Not on file Tobacco Use  Smoking status: Never Smoker  Smokeless tobacco: Never Used Substance and Sexual Activity  Alcohol use: Yes Comment: rare  Drug use: No  
 
Family History Problem Relation Age of Onset  Cancer Mother  Heart Disease Mother  Alcohol abuse Father  Diabetes Brother  Hypertension Brother Allergies Allergen Reactions  Ace Inhibitors Cough  Amlodipine Swelling  Avapro [Irbesartan] Unable to Obtain  Bactrim [Sulfamethoxazole-Trimethoprim] Other (comments) Sore mouth  Captopril Cough  Carvedilol Diarrhea  Contrast Agent [Iodine] Itching [address]  Morphine Nausea and Vomiting and Swelling  Penicillins Hives  Pravachol [Pravastatin] Nausea Only  Seafood [Shellfish Containing Products] Hives  Singulair [Montelukast] Other (comments)  
  palpitations Current Outpatient Medications Medication Sig Dispense Refill  cephALEXin (KEFLEX) 500 mg capsule Take 1 Cap by mouth four (4) times daily for 7 days. 28 Cap 0  
 bumetanide (BUMEX) 1 mg tablet Take 2 mg by mouth daily.  cloNIDine HCl (CATAPRES) 0.3 mg tablet Take 0.6 mg by mouth nightly.  warfarin (COUMADIN) 4 mg tablet Take 4 mg by mouth every Monday and Thursday. Pt takes 4 mg Mon and Thurs and 2 mg the other 5 days a week  warfarin (COUMADIN) 4 mg tablet Take 2 mg by mouth five (5) days a week. Pt takes 4 mg Mon and Thurs and 2 mg the other 5 days a week  metoprolol succinate (TOPROL-XL) 100 mg tablet TAKE TWO TABLETS BY MOUTH DAILY 60 Tab 9  
  hydrALAZINE (APRESOLINE) 100 mg tablet TAKE ONE TABLET BY MOUTH THREE TIMES A DAY 90 Tab 1  
 doxazosin (CARDURA) 4 mg tablet TAKE ONE TABLET BY MOUTH ONCE NIGHTLY 30 Tab 10  
 busPIRone (BUSPAR) 10 mg tablet TAKE ONE TABLET BY MOUTH TWICE A DAY 60 Tab 10  
 potassium chloride SR (KLOR-CON 10) 10 mEq tablet TAKE ONE TABLET BY MOUTH DAILY 90 Tab 3  
 sertraline (ZOLOFT) 50 mg tablet TAKE ONE AND ONE-HALF (1 & 1/2) TABLET BY MOUTH DAILY 45 Tab 3  
 insulin glargine (LANTUS U-100 INSULIN) 100 unit/mL injection 20 Units by SubCUTAneous route daily. 10 mL 5  
 metolazone (ZAROXOLYN) 5 mg tablet Take 1 Tab by mouth daily as needed (take if develop increased LE edema, weight gain > 5 pounds. ). 30 Tab 1  cholecalciferol, VITAMIN D3, (VITAMIN D3) 5,000 unit tab tablet Take 5,000 Units by mouth daily.  vitamin A 8,000 unit capsule Take 8,000 Units by mouth daily.  ACETAMINOPHEN/DIPHENHYDRAMINE (TYLENOL PM PO) Take 3 Tabs by mouth nightly as needed.  insulin lispro (HUMALOG) 100 unit/mL kwikpen 2 units with dinner for sugars over 200 1 Package 0  
 cyanocobalamin (VITAMIN B-12) 1,000 mcg sublingual tablet Take 1,000 mcg by mouth daily. ROS Visit Vitals /67 (BP 1 Location: Left arm, BP Patient Position: Sitting) Pulse 81 Temp 97.8 °F (36.6 °C) (Oral) Resp 16 Ht 5' 9\" (1.753 m) Wt 259 lb 9.6 oz (117.8 kg) SpO2 96% BMI 38.34 kg/m² Physical Exam  
Constitutional: She is oriented to person, place, and time. She appears well-developed and well-nourished. HENT:  
Head: Normocephalic and atraumatic. Eyes: Pupils are equal, round, and reactive to light. Neck: Neck supple. Cardiovascular: Normal rate, regular rhythm and normal heart sounds. Exam reveals no gallop. No murmur heard. Pulmonary/Chest: Effort normal and breath sounds normal. She has no wheezes. She has no rales. Musculoskeletal: She exhibits edema (1 ti mid calf bilaterally). Neurological: She is alert and oriented to person, place, and time. Skin: Skin is warm, dry and intact. No rash noted. No erythema. Feet were to examined as dressings were changed  Yesterday in wound care clinic. Psychiatric: She has a normal mood and affect. Her behavior is normal.  
Nursing note and vitals reviewed. Results for orders placed or performed in visit on 08/15/19 AMB POC PT/INR Result Value Ref Range VALID INTERNAL CONTROL POC Yes Prothrombin time (POC) INR POC 3.7 CT Results (most recent): 
Results from Hospital Encounter encounter on 08/09/19 CT LOW EXT LT WO CONT Narrative INDICATION:  R/o L foot Osteomyelitis, Chronic Non healing Planter Ulcer EXAM: CT left foot nonhealing ulcer. Diabetic. Evaluate for osteomyelitis. 6/25/2019. Thin section axial images were obtained. From these sagittal and coronal 
reformats were performed. CT dose reduction was achieved through use of a 
standardized protocol tailored for this examination and automatic exposure 
control for dose modulation. FINDINGS: There are degenerative changes of the midfoot and ankle. There is no 
fracture or bone destruction there is hyperextension of the second through fifth 
metatarsophalangeal joints. Given lack of intravenous contrast or drainable 
fluid collection is not identified Impression IMPRESSION: 
1. Plantar ulcer but no evidence of osteomyelitis or drainable fluid collection ASSESSMENT and PLAN 
  ICD-10-CM ICD-9-CM 1. Cellulitis of leg, right L03.115 682.6 2. Diabetic ulcer of right midfoot associated with type 2 diabetes mellitus, with fat layer exposed ([address] E11.621 250.80 L97.412 707.14   
3. PAF (paroxysmal atrial fibrillation) ([facility]) I48.0 427.31 AMB POC PT/INR  
   PROTHROMBIN TIME + INR  
   warfarin (COUMADIN) 4 mg tablet 4. Diabetic ulcer of left heel associated with type 2 diabetes mellitus, with fat layer exposed ([address] E11.621 250.80 L97.422 707.14 Diagnoses and all orders for this visit: 1. Cellulitis of leg, right Resolved. 2. Diabetic ulcer of right midfoot associated with type 2 diabetes mellitus, with fat layer exposed ([facility] Under care of wound clinic and podiatrist.  
 
3. PAF (paroxysmal atrial fibrillation) ([facility] -     AMB POC PT/INR 
-     PROTHROMBIN TIME + INR; Future -    Adjust warfarin (COUMADIN) 4 mg tablet; Take 1 tablet on Monday a half tablet the other 6 days. 4. Diabetic ulcer of left heel associated with type 2 diabetes mellitus, with fat layer exposed ([facility] Under care of wound clinic and podiatrist.  
 
Follow-up and Dispositions · Return in about 3 months (around 11/13/2019) for NON-fasting lab on August 29. reviewed diet, exercise and weight control I have discussed the diagnosis, evaluation and treatment options and the intended plan with the patient. Patient understands and is in agreement. The patient has received an after-visit summary and questions were answered concerning future plans. I have discussed side effects and warnings of any new medications with the patient as well.

## 2019-08-15 NOTE — PROGRESS NOTES
Margarette Mak  Identified pt with two pt identifiers(name and ). Chief Complaint   Patient presents with   [facility] Follow Up     Room 1C// [facility] - Sepsis       1. Have you been to the ER, urgent care clinic since your last visit? Hospitalized since your last visit? NO    2. Have you seen or consulted any other health care providers outside of the [address] [place] [address] since your last visit? Include any pap smears or colon screening. NO      Provider notified of reason for visit, vitals and flowsheets obtained on patients. Patient received paperwork for advance directive during previous visit but has not completed at this time     Reviewed record In preparation for visit, huddled with provider and have obtained necessary documentation      Health Maintenance Due   Topic    Shingrix Vaccine Age 50> (1 of 2)    Pneumococcal 0-64 years (2 of 3 - PCV13)    EYE EXAM RETINAL OR DILATED     Influenza Age 5 to Adult        Wt Readings from Last 3 Encounters:   19 260 lb 2.3 oz (118 kg)   07/10/19 260 lb (117.9 kg)   19 262 lb (118.8 kg)     Temp Readings from Last 3 Encounters:   19 96.9 °F (36.1 °C)   19 98 °F (36.7 °C)   19 (!) 100.5 °F (38.1 °C)     BP Readings from Last 3 Encounters:   19 149/69   19 134/66   19 145/69     Pulse Readings from Last 3 Encounters:   19 68   19 80   19 67     There were no vitals filed for this visit.       Learning Assessment:  :     Learning Assessment 3/25/2014   PRIMARY LEARNER Patient   HIGHEST LEVEL OF EDUCATION - PRIMARY LEARNER  2 YEARS OF COLLEGE   BARRIERS PRIMARY LEARNER NONE   CO-LEARNER CAREGIVER No   PRIMARY LANGUAGE ENGLISH    NEED No   LEARNER PREFERENCE PRIMARY DEMONSTRATION   LEARNING SPECIAL TOPICS Does does a pacemaker any noise   ANSWERED BY patient   RELATIONSHIP SELF       Depression Screening:  :     3 most recent PHQ Screens 3/11/2019   Little interest or pleasure in doing things Not at all   Feeling down, depressed, irritable, or hopeless Not at all   Total Score PHQ 2 0       Fall Risk Assessment:  :     Fall Risk Assessment, last 12 mths 8/15/2019   Able to walk? Yes   Fall in past 12 months? No       Abuse Screening:  :     Abuse Screening Questionnaire 8/15/2019   Do you ever feel afraid of your partner? N   Are you in a relationship with someone who physically or mentally threatens you? N   Is it safe for you to go home? Y       ADL Screening:  :     ADL Assessment 11/17/2014   Feeding yourself No Help Needed   Getting from bed to chair No Help Needed   Getting dressed No Help Needed   Bathing or showering No Help Needed   Walk across the room (includes cane/walker) No Help Needed   Using the telphone No Help Needed   Taking your medications No Help Needed   Preparing meals No Help Needed   Managing money (expenses/bills) No Help Needed   Moderately strenuous housework (laundry) No Help Needed   Shopping for personal items (toiletries/medicines) No Help Needed   Shopping for groceries No Help Needed   Driving No Help Needed   Climbing a flight of stairs No Help Needed   Getting to places beyond walking distances No Help Needed         Medication reconciliation up to date and corrected with patient at this time.

## 2019-08-16 ENCOUNTER — HOSPITAL ENCOUNTER (OUTPATIENT)
Dept: WOUND CARE | Age: 59
Discharge: HOME | End: 2019-08-16
Payer: COMMERCIAL

## 2019-08-16 VITALS
SYSTOLIC BLOOD PRESSURE: 153 MMHG | TEMPERATURE: 97 F | HEART RATE: 81 BPM | DIASTOLIC BLOOD PRESSURE: 81 MMHG | RESPIRATION RATE: 18 BRPM

## 2019-08-16 LAB
BACTERIA SPEC CULT: NORMAL
BACTERIA SPEC CULT: NORMAL
SERVICE CMNT-IMP: NORMAL

## 2019-08-16 PROCEDURE — 97607 NEG PRS WND THR NDME<=50SQCM: CPT

## 2019-08-20 ENCOUNTER — HOSPITAL ENCOUNTER (OUTPATIENT)
Dept: WOUND CARE | Age: 59
Discharge: HOME | End: 2019-08-20
Payer: COMMERCIAL

## 2019-08-20 VITALS
HEART RATE: 90 BPM | DIASTOLIC BLOOD PRESSURE: 75 MMHG | SYSTOLIC BLOOD PRESSURE: 154 MMHG | TEMPERATURE: 96.9 F | RESPIRATION RATE: 16 BRPM

## 2019-08-20 DIAGNOSIS — L03.115 CELLULITIS OF RIGHT LOWER EXTREMITY: ICD-10-CM

## 2019-08-20 PROCEDURE — 97607 NEG PRS WND THR NDME<=50SQCM: CPT

## 2019-08-20 NOTE — WOUND CARE
Patient in for Nurse Visit.     08/20/19 1121   Wound Foot Right;Plantar;Lateral 07/30/19   Date First Assessed/Time First Assessed: 07/30/19 1034   Present on Hospital Admission: Yes  Wound Approximate Age at First Assessment (Weeks): 1 weeks  Primary Wound Type: Diabetic Ulcer  Location: Foot  Wound Location Orientation: Right;Plantar;Late. .. Dressing Status Removed   Dressing Type Aquacel;Gauze;Gauze wrap (arnie); Special tape (comment)   Condition of Base Pink   Condition of Edges Calloused   Drainage Amount Moderate   Drainage Color Serosanguinous   Wound Odor None   Sandra-wound Assessment Intact   Cleansing and Cleansing Agents  Normal saline   Dressing Type Applied Absorptive; Alginate;Gauze;Gauze wrap (arnie); Silver products; Special tape (comment)   Wound Foot Left;Plantar   Date First Assessed/Time First Assessed: 06/21/19 0857   Present on Hospital Admission: Yes  Primary Wound Type: Diabetic  Location: Foot  Wound Location Orientation: Left;Plantar   Dressing Status Removed   Dressing Type Gauze;Gauze wrap (arnie); Special tape (comment)   Condition of [facility]; Purple   Condition of Edges Calloused; Open   Drainage Amount Moderate   Drainage Color Serosanguinous   Wound Odor None   Sandra-wound Assessment Intact   Cleansing and Cleansing Agents  Normal saline   Dressing Type Applied Silver products; Alginate; Absorptive;Gauze;Gauze wrap (arnie); Special tape (comment)   Wound Breast Left   Date First Assessed/Time First Assessed: 06/07/19 0834   Location: Breast  Wound Location Orientation: Left   Dressing Status Removed   Dressing Type Vacuum dressing   Condition of Base Pink   Condition of Edges Open   Drainage Amount Moderate   Drainage Color Serosanguinous   Wound Odor None   Sandra-wound Assessment Intact   Cleansing and Cleansing Agents  Normal saline   Dressing Type Applied Vacuum dressing  (Snap vac @ 125 mm hg)     Visit Vitals  /75   Pulse 90   Temp 96.9 °F (36.1 °C)   Resp 16     Patient discharge home by self without assistance. No c/o pain.

## 2019-08-23 ENCOUNTER — HOSPITAL ENCOUNTER (OUTPATIENT)
Dept: WOUND CARE | Age: 59
Discharge: HOME | End: 2019-08-23
Payer: COMMERCIAL

## 2019-08-23 VITALS
TEMPERATURE: 97.5 F | DIASTOLIC BLOOD PRESSURE: 84 MMHG | HEART RATE: 82 BPM | SYSTOLIC BLOOD PRESSURE: 174 MMHG | RESPIRATION RATE: 16 BRPM

## 2019-08-23 DIAGNOSIS — L03.115 CELLULITIS OF RIGHT LOWER EXTREMITY: ICD-10-CM

## 2019-08-23 PROCEDURE — 11042 DBRDMT SUBQ TIS 1ST 20SQCM/<: CPT

## 2019-08-23 PROCEDURE — 74011000250 HC RX REV CODE- 250: Performed by: OTOLARYNGOLOGY

## 2019-08-23 RX ADMIN — Medication: at 09:26

## 2019-08-23 NOTE — PROGRESS NOTES
[address]  WOUND CARE PROGRESS NOTE    Name:  Yesenia Salgado  MR#:  [id number]  :  1960  ACCOUNT #:  [de-identified]  DATE OF SERVICE:  2019    SUBJECTIVE:  The patient returns for treatment of 3 wounds. The wound to the left breast, S21.002 D and bilateral diabetic foot ulcers E11.621. The patient had a good week. She has been feeling better and has had no new medicines or changes in her current medications, allergies or review of systems. OBJECTIVE:  VITAL SIGNS:  Her temperature today is 97.5, pulse 82, respirations 16, blood pressure 174/84. WOUND EXAMINATION:  The wound of the left breast is smaller at 2.1 x 3.5 x 0.1 cm. It is filled with nice healthy granulation tissue and is not in need of debridement. The wound of the right lateral plantar foot measures 1 x 0.7 x 0.2 cm. It is surrounded by a rim of a very thick callus and the surface of the wound is covered with devitalized tissue. It is recommended that this wound be debrided. The wound of the left heel measures 1.6 x 1.9 x 0.2 cm which is slightly larger than previously. It is surrounded by a rim of macerated callus and the surface of the wound is covered with devitalized tissue. It is recommended this wound also be debrided. I explained the risks and benefits of wound debridement. The patient understood and wished to proceed. Therefore, using a #15 blade, the periwound calluses was were pared down to healthy epithelium. The surface of the wounds were then debrided of devitalized tissue with the use of a 5-mm ring curette. There were 2 bleeder sites on the right plantar wound, which required cauterization and once this was accomplished all bleeding ceased. The post debridement dimensions of the right plantar foot was 1.2 x 0.8 x 0.2 cm. The post debridement dimensions of the left heel wound was 2.0 x 2.0 x 0.2 cm.   The wounds were then scrubbed with chlorhexidine followed by normal saline. ASSESSMENT AND PLAN:  We are going to continue the SNaP VAC to the left breast wound, and Aquacel Ag, Exu-Dry, gauze to the DFUs. The patient is wearing shoes with holes cutout in the felt to offload these areas. She is going to see Dr. Harrison Bryan on 08/30/2019 for consideration of reapplication of the left total contact cast.  She will return for a nursing visit 7 days from today and I will stop in at that time just to make sure things are on schedule. All questions were answered. Her condition on discharge is stable.       MD SHEEBA [email or web address]  D:  08/23/2019 10:14  T:  08/23/2019 10:23  JOB #:  [id number]

## 2019-08-23 NOTE — WOUND CARE
08/23/19 0906   Wound Foot Right;Plantar;Lateral 07/30/19   Date First Assessed/Time First Assessed: 07/30/19 1034   Present on Hospital Admission: Yes  Wound Approximate Age at First Assessment (Weeks): 1 weeks  Primary Wound Type: Diabetic Ulcer  Location: Foot  Wound Location Orientation: Right;Plantar;Late. .. Dressing Status Removed   Dressing Type Aquacel;Silver products;Gauze wrap (arnie);Gauze   Wound Length (cm) 1 cm   Wound Width (cm) 0.7 cm   Wound Depth (cm) 0.2 cm   Wound Volume (cm^3) 0.14 cm^3   Condition of Base Pink   Condition of Edges Calloused   Drainage Amount Moderate   Drainage Color Serosanguinous   Wound Odor None   Sandra-wound Assessment Maceration; Intact   Cleansing and Cleansing Agents  Normal saline   Wound Foot Left;Plantar   Date First Assessed/Time First Assessed: 06/21/19 0857   Present on Hospital Admission: Yes  Primary Wound Type: Diabetic  Location: Foot  Wound Location Orientation: Left;Plantar   Dressing Status Removed   Dressing Type Aquacel;Silver products;Gauze;Gauze wrap (arnie)   Wound Length (cm) 1.6 cm   Wound Width (cm) 1.9 cm   Wound Depth (cm) 0.2 cm   Wound Volume (cm^3) 0.61 cm^3   Condition of Base Pink;Slough   Condition of Edges Calloused   Drainage Amount Moderate   Drainage Color Serosanguinous   Wound Odor None   Sandra-wound Assessment Intact   Cleansing and Cleansing Agents  Normal saline   Wound Breast Left   Date First Assessed/Time First Assessed: 06/07/19 0834   Location: Breast  Wound Location Orientation: Left   Dressing Status Removed   Dressing Type Vacuum dressing   Wound Length (cm) 2.1 cm   Wound Width (cm) 3.5 cm   Wound Depth (cm) 0.1 cm   Wound Volume (cm^3) 0.74 cm^3   Condition of Base Pink   Condition of Edges Open   Drainage Amount Moderate   Drainage Color Serosanguinous   Wound Odor None   Sandra-wound Assessment Intact   Cleansing and Cleansing Agents  Normal saline               Visit Vitals  /84 (BP 1 Location: Left arm, BP Patient Position: Sitting)   Pulse 82   Temp 97.5 °F (36.4 °C)   Resp 16

## 2019-08-23 NOTE — WOUND CARE
08/23/19 1016   Wound Foot Right;Plantar;Lateral 07/30/19   Date First Assessed/Time First Assessed: 07/30/19 1034   Present on Hospital Admission: Yes  Wound Approximate Age at First Assessment (Weeks): 1 weeks  Primary Wound Type: Diabetic Ulcer  Location: Foot  Wound Location Orientation: Right;Plantar;Late. .. Dressing Type Applied Silver products; Alginate; Absorptive;Gauze;Gauze wrap (arnie); Special tape (comment)   Wound Foot Left;Plantar   Date First Assessed/Time First Assessed: 06/21/19 0857   Present on Hospital Admission: Yes  Primary Wound Type: Diabetic  Location: Foot  Wound Location Orientation: Left;Plantar   Dressing Type Applied Silver products; Alginate; Absorptive;Gauze wrap (arnie);Gauze;Special tape (comment)   Wound Breast Left   Date First Assessed/Time First Assessed: 06/07/19 0834   Location: Breast  Wound Location Orientation: Left   Dressing Type Applied Vacuum dressing  (snap vac @125 mm Hg)

## 2019-08-27 ENCOUNTER — HOSPITAL ENCOUNTER (OUTPATIENT)
Dept: WOUND CARE | Age: 59
Discharge: HOME | End: 2019-08-27
Payer: COMMERCIAL

## 2019-08-27 VITALS
RESPIRATION RATE: 16 BRPM | SYSTOLIC BLOOD PRESSURE: 188 MMHG | DIASTOLIC BLOOD PRESSURE: 95 MMHG | TEMPERATURE: 97.8 F | HEART RATE: 86 BPM

## 2019-08-27 PROCEDURE — 11042 DBRDMT SUBQ TIS 1ST 20SQCM/<: CPT

## 2019-08-27 PROCEDURE — 97607 NEG PRS WND THR NDME<=50SQCM: CPT

## 2019-08-27 NOTE — WOUND CARE
08/27/19 1606   Wound Foot Right;Plantar;Lateral 07/30/19   Date First Assessed/Time First Assessed: 07/30/19 1034   Present on Hospital Admission: Yes  Wound Approximate Age at First Assessment (Weeks): 1 weeks  Primary Wound Type: Diabetic Ulcer  Location: Foot  Wound Location Orientation: Right;Plantar;Late. .. Dressing Type Applied   (aquacel ag, 4x4s, exudry, RG)   Procedure Tolerated Well   Wound Foot Left;Plantar   Date First Assessed/Time First Assessed: 06/21/19 0857   Present on Hospital Admission: Yes  Primary Wound Type: Diabetic  Location: Foot  Wound Location Orientation: Left;Plantar   Dressing Type Applied   (aquacel ag, 4x4s. TCC)   Wound Breast Left   Date First Assessed/Time First Assessed: 06/07/19 0834   Location: Breast  Wound Location Orientation: Left   Dressing Type Applied   (snap vac, -125mmhg)   Discharged from wound center, has follow up apt Friday for nurse visit. Ambulatory with family member. . Using assistive device. . Pain 0/10.

## 2019-08-27 NOTE — WOUND CARE
08/27/19 1529   Wound Foot Right;Plantar;Lateral 07/30/19   Date First Assessed/Time First Assessed: 07/30/19 1034   Present on Hospital Admission: Yes  Wound Approximate Age at First Assessment (Weeks): 1 weeks  Primary Wound Type: Diabetic Ulcer  Location: Foot  Wound Location Orientation: Right;Plantar;Late. ..    Dressing Status Removed   Dressing Type Aquacel;Gauze;Gauze wrap (arnie)   Wound Length (cm) 1.1 cm   Wound Width (cm) 0.9 cm   Wound Depth (cm) 0.2 cm   Wound Volume (cm^3) 0.2 cm^3   Condition of Base Pink   Condition of Edges Calloused   Drainage Amount Moderate   Drainage Color Serosanguinous   Wound Odor None   Sandra-wound Assessment Maceration   Cleansing and Cleansing Agents  Normal saline   Wound Foot Left;Plantar   Date First Assessed/Time First Assessed: 06/21/19 0857   Present on Hospital Admission: Yes  Primary Wound Type: Diabetic  Location: Foot  Wound Location Orientation: Left;Plantar   Dressing Status Removed   Dressing Type Aquacel;Gauze wrap (arnie);Gauze   Wound Length (cm) 1.7 cm   Wound Width (cm) 1.8 cm   Wound Depth (cm) 0.2 cm   Wound Volume (cm^3) 0.61 cm^3   Condition of Base Pink   Condition of Edges Calloused   Drainage Amount Moderate   Drainage Color Serosanguinous   Wound Odor None   Sandra-wound Assessment Intact   Cleansing and Cleansing Agents  Normal saline   Wound Breast Left   Date First Assessed/Time First Assessed: 06/07/19 0834   Location: Breast  Wound Location Orientation: Left   Dressing Status Removed   Dressing Type Vacuum dressing   Drainage Amount Moderate   Drainage Color Serosanguinous   Wound Odor None   Sandra-wound Assessment Intact   Cleansing and Cleansing Agents  Normal saline   Dressing Type Applied Vacuum dressing  (snap vac 125mmhg)

## 2019-08-27 NOTE — PROGRESS NOTES
Patient presents to wound clinic for: Ron Lee is a 60 y.o. female who presents for wound care of bilateral plantar foot ulcers. Patient currently being treated by Dr. Rahel Manzo for a wound of the left breast, and was referred to me for further offloading and wound care recommendations of the bilateral heel ulcers. The ulcer of the plantar left heel occurred first and patient was placed in a heel offloading shoe. She then subsequently developed an ulceration of the plantar 5th met base of the right foot. She notes that the wounds are painful when ambulating. She has been wearing her sneakers with her offloading pads. She is here to possible have a TCC applied to the LLE today. Denies f/c/n/v/d.     Pertinent Medical History:  Past Medical History:   Diagnosis Date    Acquired lymphedema     Right arm    Arrhythmia     Asthma     Atrial fibrillation (HCC)     Dr. Fran Dwyer and Dr. Nelly Overton Atrial flutter ([facility] [address]     Cancer [facility])     bilateral breast    Chronic kidney disease     Diabetes mellitus type II     Dizziness     Essential hypertension     Hyperlipidemia     Hypertension     Long term (current) use of anticoagulants     Morbid obesity ([facility] [address] 3/14/2012    Osteoarthritis     Pacemaker     Sick sinus syndrome ([facility] [address]      Past Surgical History:   Procedure Laterality Date    ABDOMEN SURGERY PROC UNLISTED      gastric bypass    GASTRIC BYPASS,OBESE<150CM SAW-EN-Y  7/08    [facility]    HX AFIB ABLATION      HX CARPAL TUNNEL RELEASE      HX CERVICAL FUSION  1994    HX DILATION AND CURETTAGE  1992    HX MASTECTOMY  1998    RIGHT MODIFIED RADICAL     HX MOHS PROCEDURES  1995    right    HX ORTHOPAEDIC      ight knee surgery    HX PACEMAKER      IR INSERT TUNL CVC W PORT OVER 5 YEARS      LAMINECTOMY,CERVICAL  1994    NEEDLE BIOPSY LIVER,W OTHR [address]  8.21.07    [address] AND 1994    DE TRABECULOPLASTY BY LASER SURGERY      US GUIDE ASP OVARIAN CYST ABD APPROACH  8.21.07    RIGHT      Prior to Admission medications    Medication Sig Start Date End Date Taking? Authorizing Provider   sertraline (ZOLOFT) 50 mg tablet TAKE ONE AND ONE-HALF (1 & 1/2) TABLET BY MOUTH DAILY 8/22/19   Antonia Parkinson MD   warfarin (COUMADIN) 4 mg tablet Take 1 tablet on Monday a half tablet the other 6 days. 8/15/19   Antonia Parkinson MD   bumetanide (BUMEX) 1 mg tablet Take 2 mg by mouth daily. Provider, Historical   cloNIDine HCl (CATAPRES) 0.3 mg tablet Take 0.6 mg by mouth nightly. Provider, Historical   metoprolol succinate (TOPROL-XL) 100 mg tablet TAKE TWO TABLETS BY MOUTH DAILY 7/12/19   Antonia Parkinson MD   hydrALAZINE (APRESOLINE) 100 mg tablet TAKE ONE TABLET BY MOUTH THREE TIMES A DAY 6/24/19   Suleiman Ha NP   doxazosin (CARDURA) 4 mg tablet TAKE ONE TABLET BY MOUTH ONCE NIGHTLY 6/14/19   Antonia Parkinson MD   busPIRone (BUSPAR) 10 mg tablet TAKE ONE TABLET BY MOUTH TWICE A DAY 5/24/19   Antonia Parkinson MD   potassium chloride SR (KLOR-CON 10) 10 mEq tablet TAKE ONE TABLET BY MOUTH DAILY 4/22/19   Antione Barahona MD   insulin glargine (LANTUS U-100 INSULIN) 100 unit/mL injection 20 Units by SubCUTAneous route daily. 11/13/18   Antonia Parkinson MD   metolazone (ZAROXOLYN) 5 mg tablet Take 1 Tab by mouth daily as needed (take if develop increased LE edema, weight gain > 5 pounds. ). 4/10/14   Christian Marie NP   cholecalciferol, VITAMIN D3, (VITAMIN D3) 5,000 unit tab tablet Take 5,000 Units by mouth daily. Provider, Historical   vitamin A 8,000 unit capsule Take 8,000 Units by mouth daily. Provider, Historical   ACETAMINOPHEN/DIPHENHYDRAMINE (TYLENOL PM PO) Take 3 Tabs by mouth nightly as needed. Provider, Historical   insulin lispro (HUMALOG) 100 unit/mL kwikpen 2 units with dinner for sugars over 200 1/3/14   Antonia Parkinson MD   cyanocobalamin (VITAMIN B-12) 1,000 mcg sublingual tablet Take 1,000 mcg by mouth daily. Provider, Historical     Allergies   Allergen Reactions    Ace Inhibitors Cough    Amlodipine Swelling    Avapro [Irbesartan] Unable to Obtain    Bactrim [Sulfamethoxazole-Trimethoprim] Other (comments)     Sore mouth    Captopril Cough    Carvedilol Diarrhea    Contrast Agent [Iodine] Itching    Fish Containing Products Hives    Morphine Nausea and Vomiting and Swelling    Penicillins Hives    Pravachol [Pravastatin] Nausea Only    Seafood [Shellfish Containing Products] Hives    Singulair [Montelukast] Other (comments)     palpitations     Family History   Problem Relation Age of Onset    Cancer Mother     Heart Disease Mother     Alcohol abuse Father     Diabetes Brother     Hypertension Brother      Social History     Socioeconomic History    Marital status:      Spouse name: Not on file    Number of children: Not on file    Years of education: Not on file    Highest education level: Not on file   Occupational History    Not on file   Social Needs    Financial resource strain: Not on file    Food insecurity:     Worry: Not on file     Inability: Not on file    Transportation needs:     Medical: Not on file     Non-medical: Not on file   Tobacco Use    Smoking status: Never Smoker    Smokeless tobacco: Never Used   Substance and Sexual Activity    Alcohol use: Yes     Comment: rare    Drug use: No    Sexual activity: Not on file   Lifestyle    Physical activity:     Days per week: Not on file     Minutes per session: Not on file    Stress: Not on file   Relationships    Social connections:     Talks on phone: Not on file     Gets together: Not on file     Attends Yazidism service: Not on file     Active member of club or organization: Not on file     Attends meetings of clubs or organizations: Not on file     Relationship status: Not on file    Intimate partner violence:     Fear of current or ex partner: Not on file     Emotionally abused: Not on file     Physically abused: Not on file     Forced sexual activity: Not on file   Other Topics Concern    Not on file   Social History Narrative    Not on file       Vitals:    08/27/19 1519   BP: (!) 188/95   Pulse: 86   Resp: 16   Temp: 97.8 °F (36.6 °C)       Review of Systems:    Gen: No fever, chills, malaise, weight loss/gain. Heent: No headache, rhinorrhea, epistaxis, ear pain, hearing loss, sinus pain, neck pain/stiffness, sore throat. Heart: No chest pain, palpitations, CHOW, pnd, or orthopnea. Resp: No cough, hemoptysis, wheezing and shortness of breath. GI: No nausea, vomiting, diarrhea, constipation, melena or hematochezia. : No urinary obstruction, dysuria or hematuria. Derm: see below  Musc/skeletal: no bone or joint complains. Vasc: No edema, cyanosis or claudication. Endo: No heat/cold intolerance, no polyuria,polydipsia or polyphagia. Neuro: No unilateral weakness, numbness, tingling. No seizures. Heme: No easy bruising or bleeding. Wound Description:  Refer to nursing notes. Debridement: required today for wound healing  Ulcer Debridement    Left plantar foot wound    Character of Ulcer: Deteriorated    Indication for Debridement: Slough, Exudate, Abnormal wound edge and Abnormal Wound base     Pre debridement measurements: 1.7 cm x 1.8 cm x 0.2 cm    Risks of procedure were discussed with patient. Consent has been signed. Anesthetic: Lidocaine 4% topical cream     Level of Debridement: Subctaneous Tissue     Tissue and/or Material removed: Exudate, Fibrin/ Slough and Subcutaneous    Type of Tissue: Non- Viable      Pre-debridement severity: Fat Layer Exposed     Post debridement severity: Fat Layer exposed    Instrument(s) used: Scalpel    Bleeding controlled with: Pressure    Estimated blood loss: Minimal    Pre debridement measurements: 1.8 cm x 1.9 cm x 0.3 cm    Post procedural pain: mild    Patient tolerated procedure well.      Right plantar foot wound  Character of Ulcer: Deteriorated    Indication for Debridement: Slough, Exudate, Abnormal wound edge and Abnormal Wound base     Pre debridement measurements: 1.1 cm x 0.9 cm x 0.2 cm    Risks of procedure were discussed with patient. Consent has been signed. Anesthetic: Lidocaine 4% topical cream     Level of Debridement: Subctaneous Tissue     Tissue and/or Material removed: Callus, Exudate and Fibrin/ Slough    Type of Tissue: Non- Viable      Pre-debridement severity: Fat Layer Exposed     Post debridement severity: Fat Layer exposed    Instrument(s) used: Scalpel    Bleeding controlled with: Pressure    Estimated blood loss: Minimal    Pre debridement measurements: 1.2 cm x 1 cm x 0.3 cm    Post procedural pain: none    Patient tolerated procedure well. Infection: no    Edema: mild edema    Lower Extremity Circulation   Reviewed patient's recent ABIs. Shows decrease from when the     Offloading: Offloading padding to R shoe, TCC to left shoe  Assessment:  1. Diabetic plantar heel ulcer left foot  2. Diabetic ulcer right foot-plantar 5th met base    Plan:   -Patient seen and evaluated as noted above.  -Wound debridement performed.  -Patient needs further offloading. TCC applied to the left foot with aquacel directely over wound bed. Patient will benefit from custom molded diabetic shoes in the long-term once the wounds are healed. -Nursing to apply aquacel Ag, exudry, gauze to right foot wound on Friday nursing visit.    -Follow-up with me in 1 week.

## 2019-08-29 DIAGNOSIS — I48.0 PAF (PAROXYSMAL ATRIAL FIBRILLATION) (HCC): ICD-10-CM

## 2019-08-30 ENCOUNTER — HOSPITAL ENCOUNTER (OUTPATIENT)
Dept: WOUND CARE | Age: 59
Discharge: HOME | End: 2019-08-30
Payer: COMMERCIAL

## 2019-08-30 VITALS
DIASTOLIC BLOOD PRESSURE: 65 MMHG | SYSTOLIC BLOOD PRESSURE: 129 MMHG | HEART RATE: 87 BPM | TEMPERATURE: 97.7 F | RESPIRATION RATE: 16 BRPM

## 2019-08-30 DIAGNOSIS — I48.0 PAF (PAROXYSMAL ATRIAL FIBRILLATION) (HCC): Primary | ICD-10-CM

## 2019-08-30 LAB
INR PPP: 2.7 (ref 0.8–1.2)
PROTHROMBIN TIME: 26.1 SEC (ref 9.1–12)

## 2019-08-30 PROCEDURE — 97607 NEG PRS WND THR NDME<=50SQCM: CPT

## 2019-08-30 NOTE — WOUND CARE
08/30/19 1144   Wound Foot Right;Plantar;Lateral 07/30/19   Date First Assessed/Time First Assessed: 07/30/19 1034   Present on Hospital Admission: Yes  Wound Approximate Age at First Assessment (Weeks): 1 weeks  Primary Wound Type: Diabetic Ulcer  Location: Foot  Wound Location Orientation: Right;Plantar;Late. .. Dressing Status Removed   Dressing Type   (aquacel ag, gauze, RG)   Condition of Base Pink   Condition of Edges Calloused   Drainage Amount Moderate   Drainage Color Serosanguinous   Wound Odor None   Sandra-wound Assessment Intact   Cleansing and Cleansing Agents  Normal saline   Dressing Type Applied   (aquacel ag, gauze RG)   Wound Breast Left   Date First Assessed/Time First Assessed: 06/07/19 0834   Location: Breast  Wound Location Orientation: Left   Dressing Status Removed   Dressing Type Vacuum dressing   Condition of Base Granulation   Condition of Edges Open   Drainage Amount Small   Drainage Color Serosanguinous   Wound Odor None   Sandra-wound Assessment Intact   Cleansing and Cleansing Agents  Normal saline   Dressing Type Applied   (snap vac, -125mmhg)   in for nurse visit, denies pain. Maxi Naik Discharged from wound center ambulatory with spouse for assistance in ambulating. Maxi Naik  Has follow up apt on Tuesday 4/3/19

## 2019-08-30 NOTE — PROGRESS NOTES
Inform patient INR is therapeutic. Confirm current dose of warfarin 4 mg tablets 1 tablet on Monday a half tablet the other 6 days. . No change in warfarin dose. Repeat in one month.  Message also sent in My Chart

## 2019-09-03 ENCOUNTER — HOSPITAL ENCOUNTER (EMERGENCY)
Age: 59
Discharge: HOME | End: 2019-09-03
Attending: EMERGENCY MEDICINE
Payer: COMMERCIAL

## 2019-09-03 ENCOUNTER — HOSPITAL ENCOUNTER (OUTPATIENT)
Dept: WOUND CARE | Age: 59
Discharge: HOME | End: 2019-09-03
Payer: COMMERCIAL

## 2019-09-03 ENCOUNTER — APPOINTMENT (OUTPATIENT)
Dept: GENERAL RADIOLOGY | Age: 59
End: 2019-09-03
Attending: EMERGENCY MEDICINE
Payer: COMMERCIAL

## 2019-09-03 VITALS
TEMPERATURE: 98.1 F | OXYGEN SATURATION: 97 % | BODY MASS INDEX: 43.42 KG/M2 | HEART RATE: 73 BPM | DIASTOLIC BLOOD PRESSURE: 89 MMHG | SYSTOLIC BLOOD PRESSURE: 201 MMHG | HEIGHT: 65 IN | RESPIRATION RATE: 16 BRPM | WEIGHT: 260.58 LBS

## 2019-09-03 VITALS
SYSTOLIC BLOOD PRESSURE: 152 MMHG | RESPIRATION RATE: 18 BRPM | TEMPERATURE: 98.7 F | DIASTOLIC BLOOD PRESSURE: 63 MMHG | HEART RATE: 80 BPM

## 2019-09-03 DIAGNOSIS — L03.115 CELLULITIS OF RIGHT LOWER EXTREMITY: ICD-10-CM

## 2019-09-03 DIAGNOSIS — L89.899 PRESSURE INJURY OF SKIN OF RIGHT FOOT, UNSPECIFIED INJURY STAGE: Primary | ICD-10-CM

## 2019-09-03 LAB
ALBUMIN SERPL-MCNC: 3.4 G/DL (ref 3.5–5)
ALBUMIN/GLOB SERPL: 0.8 {RATIO} (ref 1.1–2.2)
ALP SERPL-CCNC: 91 U/L (ref 45–117)
ALT SERPL-CCNC: 15 U/L (ref 12–78)
ANION GAP SERPL CALC-SCNC: 9 MMOL/L (ref 5–15)
AST SERPL-CCNC: 13 U/L (ref 15–37)
BASOPHILS # BLD: 0.1 K/UL (ref 0–0.1)
BASOPHILS NFR BLD: 1 % (ref 0–1)
BILIRUB SERPL-MCNC: 0.4 MG/DL (ref 0.2–1)
BUN SERPL-MCNC: 28 MG/DL (ref 6–20)
BUN/CREAT SERPL: 15 (ref 12–20)
CALCIUM SERPL-MCNC: 8.9 MG/DL (ref 8.5–10.1)
CHLORIDE SERPL-SCNC: 109 MMOL/L (ref 97–108)
CO2 SERPL-SCNC: 23 MMOL/L (ref 21–32)
CREAT SERPL-MCNC: 1.82 MG/DL (ref 0.55–1.02)
DIFFERENTIAL METHOD BLD: ABNORMAL
EOSINOPHIL # BLD: 0.1 K/UL (ref 0–0.4)
EOSINOPHIL NFR BLD: 1 % (ref 0–7)
ERYTHROCYTE [DISTWIDTH] IN BLOOD BY AUTOMATED COUNT: 14 % (ref 11.5–14.5)
ERYTHROCYTE [SEDIMENTATION RATE] IN BLOOD: 41 MM/HR (ref 0–30)
GLOBULIN SER CALC-MCNC: 4.2 G/DL (ref 2–4)
GLUCOSE SERPL-MCNC: 73 MG/DL (ref 65–100)
HCT VFR BLD AUTO: 35.1 % (ref 35–47)
HGB BLD-MCNC: 11.4 G/DL (ref 11.5–16)
IMM GRANULOCYTES # BLD AUTO: 0 K/UL (ref 0–0.04)
IMM GRANULOCYTES NFR BLD AUTO: 0 % (ref 0–0.5)
LYMPHOCYTES # BLD: 0.6 K/UL (ref 0.8–3.5)
LYMPHOCYTES NFR BLD: 6 % (ref 12–49)
MCH RBC QN AUTO: 30.3 PG (ref 26–34)
MCHC RBC AUTO-ENTMCNC: 32.5 G/DL (ref 30–36.5)
MCV RBC AUTO: 93.4 FL (ref 80–99)
MONOCYTES # BLD: 0.6 K/UL (ref 0–1)
MONOCYTES NFR BLD: 6 % (ref 5–13)
NEUTS SEG # BLD: 9.4 K/UL (ref 1.8–8)
NEUTS SEG NFR BLD: 86 % (ref 32–75)
NRBC # BLD: 0 K/UL (ref 0–0.01)
NRBC BLD-RTO: 0 PER 100 WBC
PLATELET # BLD AUTO: 236 K/UL (ref 150–400)
PMV BLD AUTO: 10.2 FL (ref 8.9–12.9)
POTASSIUM SERPL-SCNC: 3.6 MMOL/L (ref 3.5–5.1)
PROT SERPL-MCNC: 7.6 G/DL (ref 6.4–8.2)
RBC # BLD AUTO: 3.76 M/UL (ref 3.8–5.2)
SODIUM SERPL-SCNC: 141 MMOL/L (ref 136–145)
WBC # BLD AUTO: 10.8 K/UL (ref 3.6–11)

## 2019-09-03 PROCEDURE — 11042 DBRDMT SUBQ TIS 1ST 20SQCM/<: CPT

## 2019-09-03 PROCEDURE — 86141 C-REACTIVE PROTEIN HS: CPT

## 2019-09-03 PROCEDURE — 85025 COMPLETE CBC W/AUTO DIFF WBC: CPT

## 2019-09-03 PROCEDURE — 73630 X-RAY EXAM OF FOOT: CPT

## 2019-09-03 PROCEDURE — 87040 BLOOD CULTURE FOR BACTERIA: CPT

## 2019-09-03 PROCEDURE — 36415 COLL VENOUS BLD VENIPUNCTURE: CPT

## 2019-09-03 PROCEDURE — 87205 SMEAR GRAM STAIN: CPT

## 2019-09-03 PROCEDURE — 85652 RBC SED RATE AUTOMATED: CPT

## 2019-09-03 PROCEDURE — 80053 COMPREHEN METABOLIC PANEL: CPT

## 2019-09-03 PROCEDURE — 74011000250 HC RX REV CODE- 250: Performed by: PODIATRIST

## 2019-09-03 PROCEDURE — 99283 EMERGENCY DEPT VISIT LOW MDM: CPT

## 2019-09-03 RX ORDER — HYDROCODONE BITARTRATE AND ACETAMINOPHEN 5; 325 MG/1; MG/1
1 TABLET ORAL
Qty: 10 TAB | Refills: 0 | Status: SHIPPED | OUTPATIENT
Start: 2019-09-03 | End: 2019-09-06

## 2019-09-03 RX ORDER — CEPHALEXIN 500 MG/1
500 CAPSULE ORAL 4 TIMES DAILY
Qty: 28 CAP | Refills: 0 | Status: SHIPPED | OUTPATIENT
Start: 2019-09-03 | End: 2019-09-10

## 2019-09-03 RX ADMIN — Medication: at 14:32

## 2019-09-03 NOTE — PROGRESS NOTES
Patient presents to wound clinic for: Kaylyn Hester is a 60 y.o. female who presents for wound care of bilateral plantar foot ulcers. Patient currently being treated by Dr. Iris Shepard for a wound of the left breast, and was referred to me for further offloading and wound care recommendations of the bilateral heel ulcers. The ulcer of the plantar left heel occurred first and patient was placed in a heel offloading shoe. She then subsequently developed an ulceration of the plantar 5th met base of the right foot. She notes that the wounds are painful when ambulating. She has been wearing her sneakers with her offloading pads. Since Sunday, she has been feeling unwell and has been having increased pain to her right foot and has been having difficulty ambulating to that foot. As a result she's been placing more of her weight on her left heel. She notes that she has been experiencing general malaise and has been feeling feverish today, but with no measurable fever. Denies chills or nausea. Notes that she has been noticing a malodor from the right foot over the past two days and that it is throbbing constantly even without weightbearing.     Pertinent Medical History:  Past Medical History:   Diagnosis Date    Acquired lymphedema     Right arm    Arrhythmia     Asthma     Atrial fibrillation (HCC)     Dr. Beatriz Gibbs and Dr. Pendleton Friends Atrial flutter ([facility] [address]     Cancer [facility])     bilateral breast    Chronic kidney disease     Diabetes mellitus type II     Dizziness     Essential hypertension     Hyperlipidemia     Hypertension     Long term (current) use of anticoagulants     Morbid obesity ([facility] [address] 3/14/2012    Osteoarthritis     Pacemaker     Sick sinus syndrome [facility])      Past Surgical History:   Procedure Laterality Date    ABDOMEN SURGERY PROC UNLISTED      gastric bypass    GASTRIC BYPASS,OBESE<150CM SAW-EN-Y  7/08    [facility]    HX AFIB ABLATION      HX CARPAL TUNNEL RELEASE      HX CERVICAL FUSION [address]    HX MASTECTOMY  1998    RIGHT MODIFIED RADICAL     HX MOHS PROCEDURES  1995    right    HX ORTHOPAEDIC      ight knee surgery    HX PACEMAKER      IR INSERT TUNL CVC W PORT OVER 5 YEARS      [address]    NEEDLE BIOPSY LIVER,W OTHR [address]  8.21.07    [address] AND 1994    NM TRABECULOPLASTY BY LASER SURGERY      US GUIDE ASP OVARIAN CYST ABD APPROACH  8.21.07    RIGHT      Prior to Admission medications    Medication Sig Start Date End Date Taking? Authorizing Provider   sertraline (ZOLOFT) 50 mg tablet TAKE ONE AND ONE-HALF (1 & 1/2) TABLET BY MOUTH DAILY 8/22/19   Antonia Parkinson MD   warfarin (COUMADIN) 4 mg tablet Take 1 tablet on Monday a half tablet the other 6 days. 8/15/19   Antonia Parkinson MD   bumetanide (BUMEX) 1 mg tablet Take 2 mg by mouth daily. Provider, Historical   cloNIDine HCl (CATAPRES) 0.3 mg tablet Take 0.6 mg by mouth nightly. Provider, Historical   metoprolol succinate (TOPROL-XL) 100 mg tablet TAKE TWO TABLETS BY MOUTH DAILY 7/12/19   Antonia Parkinson MD   hydrALAZINE (APRESOLINE) 100 mg tablet TAKE ONE TABLET BY MOUTH THREE TIMES A DAY 6/24/19   Suleiman Ha NP   doxazosin (CARDURA) 4 mg tablet TAKE ONE TABLET BY MOUTH ONCE NIGHTLY 6/14/19   Antonia Parkinson MD   busPIRone (BUSPAR) 10 mg tablet TAKE ONE TABLET BY MOUTH TWICE A DAY 5/24/19   Antonia Parkinson MD   potassium chloride SR (KLOR-CON 10) 10 mEq tablet TAKE ONE TABLET BY MOUTH DAILY 4/22/19   Antione Barahona MD   insulin glargine (LANTUS U-100 INSULIN) 100 unit/mL injection 20 Units by SubCUTAneous route daily. 11/13/18   Antonia Parkinson MD   metolazone (ZAROXOLYN) 5 mg tablet Take 1 Tab by mouth daily as needed (take if develop increased LE edema, weight gain > 5 pounds. ). 4/10/14   Christian Marie NP   cholecalciferol, VITAMIN D3, (VITAMIN D3) 5,000 unit tab tablet Take 5,000 Units by mouth daily. Provider, Historical   vitamin A 8,000 unit capsule Take 8,000 Units by mouth daily. Provider, Historical   ACETAMINOPHEN/DIPHENHYDRAMINE (TYLENOL PM PO) Take 3 Tabs by mouth nightly as needed. Provider, Historical   insulin lispro (HUMALOG) 100 unit/mL kwikpen 2 units with dinner for sugars over 200 1/3/14   Bola Andersen MD   cyanocobalamin (VITAMIN B-12) 1,000 mcg sublingual tablet Take 1,000 mcg by mouth daily.     Provider, Historical     Allergies   Allergen Reactions    Ace Inhibitors Cough    Amlodipine Swelling    Avapro [Irbesartan] Unable to Obtain    Bactrim [Sulfamethoxazole-Trimethoprim] Other (comments)     Sore mouth    Captopril Cough    Carvedilol Diarrhea    Contrast Agent [Iodine] Itching    Fish Containing Products Hives    Morphine Nausea and Vomiting and Swelling    Penicillins Hives    Pravachol [Pravastatin] Nausea Only    Seafood [Shellfish Containing Products] Hives    Singulair [Montelukast] Other (comments)     palpitations     Family History   Problem Relation Age of Onset    Cancer Mother     Heart Disease Mother     Alcohol abuse Father     Diabetes Brother     Hypertension Brother      Social History     Socioeconomic History    Marital status:      Spouse name: Not on file    Number of children: Not on file    Years of education: Not on file    Highest education level: Not on file   Occupational History    Not on file   Social Needs    Financial resource strain: Not on file    Food insecurity:     Worry: Not on file     Inability: Not on file    Transportation needs:     Medical: Not on file     Non-medical: Not on file   Tobacco Use    Smoking status: Never Smoker    Smokeless tobacco: Never Used   Substance and Sexual Activity    Alcohol use: Yes     Comment: rare    Drug use: No    Sexual activity: Not on file   Lifestyle    Physical activity:     Days per week: Not on file     Minutes per session: Not on file    Stress: Not on file   Relationships    Social connections:     Talks on phone: Not on file     Gets together: Not on file     Attends Hoahaoism service: Not on file     Active member of club or organization: Not on file     Attends meetings of clubs or organizations: Not on file     Relationship status: Not on file    Intimate partner violence:     Fear of current or ex partner: Not on file     Emotionally abused: Not on file     Physically abused: Not on file     Forced sexual activity: Not on file   Other Topics Concern    Not on file   Social History Narrative    Not on file       Vitals:    09/03/19 1425   BP: 152/63   Pulse: 80   Resp: 18   Temp: 98.7 °F (37.1 °C)       Review of Systems:    Gen: No fever, chills, malaise, weight loss/gain. Heent: No headache, rhinorrhea, epistaxis, ear pain, hearing loss, sinus pain, neck pain/stiffness, sore throat. Heart: No chest pain, palpitations, CHOW, pnd, or orthopnea. Resp: No cough, hemoptysis, wheezing and shortness of breath. GI: No nausea, vomiting, diarrhea, constipation, melena or hematochezia. : No urinary obstruction, dysuria or hematuria. Derm: see below  Musc/skeletal: no bone or joint complains. Vasc: No edema, cyanosis or claudication. Endo: No heat/cold intolerance, no polyuria,polydipsia or polyphagia. Neuro: No unilateral weakness, numbness, tingling. No seizures. Heme: No easy bruising or bleeding. Wound Description:  Refer to nursing notes for measurements. The right plantar 5th met wound has erythema and malodorous serous drainage. Area is signficantly tender to the slightest touch.      09/03/19 1427   Wound Foot Left;Plantar   Date First Assessed/Time First Assessed: 06/21/19 0857   Present on Hospital Admission: Yes  Primary Wound Type: Diabetic  Location: Foot  Wound Location Orientation: Left;Plantar   Dressing Status Removed   Dressing Type    (aquacel ag, foam. TCC)   Wound Length (cm) 1.8 cm   Wound Width (cm) 2 cm   Wound Depth (cm) 0.1 cm   Wound Volume (cm^3) 0.36 cm^3   Condition of Base Pink   Condition of Edges Calloused   Drainage Amount Moderate   Drainage Color Serosanguinous   Wound Odor None   Sandra-wound Assessment Intact   Cleansing and Cleansing Agents  Normal saline   Wound Foot Right;Plantar;Lateral 07/30/19   Date First Assessed/Time First Assessed: 07/30/19 1034   Present on Hospital Admission: Yes  Wound Approximate Age at First Assessment (Weeks): 1 weeks  Primary Wound Type: Diabetic Ulcer  Location: Foot  Wound Location Orientation: Right;Plantar;Late. .. Dressing Status Removed   Dressing Type    (aquacel ag, gauze RG)   Wound Length (cm) 1 cm   Wound Width (cm) 0.8 cm   Wound Depth (cm) 0.2 cm   Wound Volume (cm^3) 0.16 cm^3   Condition of Base Pink   Condition of Edges Calloused   Undermining (cm)    (none)   Drainage Amount Moderate   Drainage Color Serosanguinous   Wound Odor None   Sandra-wound Assessment Intact   Cleansing and Cleansing Agents  Normal saline       Debridement: required today for wound healing; debridement of the right foot deferred today due to pain to right foot and further workup needed in the ED  Ulcer Debridement    Left plantar foot wound    Character of Ulcer: Deteriorated    Indication for Debridement: Slough, Exudate, Abnormal wound edge and Abnormal Wound base     Pre debridement measurements: 1.8 cm x 2.0 cm x 0.1 cm    Risks of procedure were discussed with patient. Consent has been signed.      Anesthetic: Lidocaine 4% topical cream     Level of Debridement: Subctaneous Tissue     Tissue and/or Material removed: Exudate, Fibrin/ Slough and Subcutaneous    Type of Tissue: Non- Viable      Pre-debridement severity: Fat Layer Exposed     Post debridement severity: Fat Layer exposed    Instrument(s) used: Scalpel    Bleeding controlled with: Pressure    Estimated blood loss: Minimal    Pre debridement measurements: 1.9 cm x 2.1 cm x 0.2 cm    Post procedural pain: mild    Patient tolerated procedure well. Infection: yes to right foot    Edema: mild edema    Lower Extremity Circulation   Reviewed patient's recent ABIs. Shows decrease from when the     Offloading: Offloading padding to shoes  Assessment:  1. Diabetic plantar heel ulcer left foot  2. Diabetic ulcer right foot-plantar 5th met base    Plan:   -Patient seen and evaluated as noted above.  -Wound debridement performed.  -Due to patient feeling unwell for the past few days, with increased pain to the right foot ulcer with erythema and malodorous drainage, recommend patient proceed to the ED for further evaluation. Wrote a note to the ED staff recommending x-rays and CBC/BMP.  -Gauze dressing applied to both feet.

## 2019-09-03 NOTE — ED PROVIDER NOTES
EMERGENCY DEPARTMENT HISTORY AND PHYSICAL EXAM      Date: 9/3/2019  Patient Name: Alma Law    Please note that this dictation was completed with [employer], the computer voice recognition software. Quite often unanticipated grammatical, syntax, homophones, and other interpretive errors are inadvertently transcribed by the computer software. Please disregard these errors. Please excuse any errors that have escaped final proofreading. History of Presenting Illness     Chief Complaint   Patient presents with    Wound Check     sent by wound clinic for wound on right foot       History Provided By: Patient    HPI: Alma Law, 60 y.o. female with PMHx significant for insulin-dependent diabetes, chronic kidney disease, hypertension, hyperlipidemia, osteoarthritis, presents ambulatory to the ED with cc of a wound to the bottom of her right foot. Patient states that she goes to the wound care center twice weekly and follows up with her podiatrist, Dr. Rom Goff. She was at Dr. Himanshu Hawkins office earlier today and was sent here for labs and x-ray to rule out any infection. Patient reports an increase in pain to this area and states that her doctor told her that it was also slightly red around it. Patient had seen drainage on the bandage prior to going to the podiatrist today but states that since she debrided at the office, she has not noticed any drainage. Of note, patient also has a wound to the heel of her left foot but she is not currently experiencing any pain to the wound. Patient denies fevers, chills, nausea, vomiting, history of peripheral arterial disease. PCP: Arpit Simons MD    There are no other complaints, changes, or physical findings at this time. Current Outpatient Medications   Medication Sig Dispense Refill    cephALEXin (KEFLEX) 500 mg capsule Take 1 Cap by mouth four (4) times daily for 7 days.  28 Cap 0    HYDROcodone-acetaminophen (NORCO) 5-325 mg per tablet Take 1 Tab by mouth every four (4) hours as needed for Pain for up to 3 days. Max Daily Amount: 6 Tabs. 10 Tab 0    sertraline (ZOLOFT) 50 mg tablet TAKE ONE AND ONE-HALF (1 & 1/2) TABLET BY MOUTH DAILY 45 Tab 5    warfarin (COUMADIN) 4 mg tablet Take 1 tablet on Monday a half tablet the other 6 days. 60 Tab 5    bumetanide (BUMEX) 1 mg tablet Take 2 mg by mouth daily.  cloNIDine HCl (CATAPRES) 0.3 mg tablet Take 0.6 mg by mouth nightly.  metoprolol succinate (TOPROL-XL) 100 mg tablet TAKE TWO TABLETS BY MOUTH DAILY 60 Tab 9    hydrALAZINE (APRESOLINE) 100 mg tablet TAKE ONE TABLET BY MOUTH THREE TIMES A DAY 90 Tab 1    doxazosin (CARDURA) 4 mg tablet TAKE ONE TABLET BY MOUTH ONCE NIGHTLY 30 Tab 10    busPIRone (BUSPAR) 10 mg tablet TAKE ONE TABLET BY MOUTH TWICE A DAY 60 Tab 10    potassium chloride SR (KLOR-CON 10) 10 mEq tablet TAKE ONE TABLET BY MOUTH DAILY 90 Tab 3    insulin glargine (LANTUS U-100 INSULIN) 100 unit/mL injection 20 Units by SubCUTAneous route daily. 10 mL 5    metolazone (ZAROXOLYN) 5 mg tablet Take 1 Tab by mouth daily as needed (take if develop increased LE edema, weight gain > 5 pounds. ). 30 Tab 1    cholecalciferol, VITAMIN D3, (VITAMIN D3) 5,000 unit tab tablet Take 5,000 Units by mouth daily.  vitamin A 8,000 unit capsule Take 8,000 Units by mouth daily.  insulin lispro (HUMALOG) 100 unit/mL kwikpen 2 units with dinner for sugars over 200 1 Package 0    cyanocobalamin (VITAMIN B-12) 1,000 mcg sublingual tablet Take 1,000 mcg by mouth daily.        Facility-Administered Medications Ordered in Other Encounters   Medication Dose Route Frequency Provider Last Rate Last Dose    lidocaine (ALOCANE) 4 % topical gel   Topical PRN Daniel Marcos DPM           Past History     Past Medical History:  Past Medical History:   Diagnosis Date    Acquired lymphedema     Right arm    Arrhythmia     Asthma     Atrial fibrillation (HCC)     Dr. Diane Fulton and Dr. Uriel Shearer flutter ([facility] [address]     Cancer ([facility] [address]     bilateral breast    Chronic kidney disease     Diabetes mellitus type II     Dizziness     Essential hypertension     Hyperlipidemia     Hypertension     Long term (current) use of anticoagulants     Morbid obesity ([facility] [address] 3/14/2012    Osteoarthritis     Pacemaker     Sick sinus syndrome ([facility] [address]        Past Surgical History:  Past Surgical History:   Procedure Laterality Date    ABDOMEN SURGERY PROC UNLISTED      gastric bypass    GASTRIC BYPASS,OBESE<150CM SAW-EN-Y  7/08    hutcher    HX AFIB ABLATION      HX CARPAL TUNNEL RELEASE      HX CERVICAL FUSION  1994    HX DILATION AND CURETTAGE  1992    HX MASTECTOMY  1998    RIGHT MODIFIED RADICAL     HX MOHS PROCEDURES  1995    right    HX ORTHOPAEDIC      ight knee surgery    HX PACEMAKER      IR INSERT TUNL CVC W PORT OVER 5 YEARS      [address]    NEEDLE BIOPSY LIVER,W OTHR [address]  8.21.07    [place] [address] AND 1994    [place] TRABECULOPLASTY BY LASER SURGERY      US GUIDE ASP OVARIAN CYST ABD APPROACH  8.21.07    RIGHT        Family History:  Family History   Problem Relation Age of Onset    Cancer Mother     Heart Disease Mother     Alcohol abuse Father     Diabetes Brother     Hypertension Brother        Social History:  Social History     Tobacco Use    Smoking status: Never Smoker    Smokeless tobacco: Never Used   Substance Use Topics    Alcohol use: Yes     Comment: rare    Drug use: No       Allergies:   Allergies   Allergen Reactions    Ace Inhibitors Cough    Amlodipine Swelling    Avapro [Irbesartan] Unable to Obtain    Bactrim [Sulfamethoxazole-Trimethoprim] Other (comments)     Sore mouth    Captopril Cough    Carvedilol Diarrhea    Contrast Agent [Iodine] Itching    Fish Containing Products Hives    Morphine Nausea and Vomiting and Swelling    Penicillins Hives    Pravachol [Pravastatin] Nausea Only    Seafood [Shellfish Containing Products] Hives    Singulair [[facility]] Other (comments)     palpitations         Review of Systems   Review of Systems    Physical Exam   Physical Exam   Constitutional: She is oriented to person, place, and time. Vital signs are normal. She appears well-developed and well-nourished. No distress. 60 y.o. female in NAD  Communicates appropriately and in full sentences  Normal vital signs   HENT:   Head: Normocephalic and atraumatic. Eyes: Pupils are equal, round, and reactive to light. Conjunctivae are normal. Right eye exhibits no discharge. Left eye exhibits no discharge. Neck: Normal range of motion. Neck supple. No nuchal rigidity   Cardiovascular: Normal rate, regular rhythm and intact distal pulses. Pulmonary/Chest: Effort normal and breath sounds normal. No respiratory distress. She has no wheezes. Abdominal: Soft. Bowel sounds are normal. She exhibits no distension. There is no tenderness. Musculoskeletal: Normal range of motion. She exhibits no edema, tenderness or deformity. No neurologic or vascular compromise on exam.    Neurological: She is alert and oriented to person, place, and time. Coordination normal.   Skin: Skin is warm and dry. She is not diaphoretic. There is erythema. No pallor. 1.5 x 2 cm wound to plantar aspect of L heel. No appreciable drainage. Non-TTP. R FOOT: 1 x 1 cm superficial pressure ulcer to base of 5th MT with clear drainage. No appreciable odor. Slight halo erythema (see photos). Wound edges are callused. Psychiatric: She has a normal mood and affect. Nursing note and vitals reviewed.                 Diagnostic Study Results     Labs -     Recent Results (from the past 12 hour(s))   CULTURE, WOUND Gary De Paz STAIN    Collection Time: 09/03/19  6:28 PM   Result Value Ref Range    Special Requests: NO SPECIAL REQUESTS      GRAM STAIN RARE WBCS SEEN      GRAM STAIN NO ORGANISMS SEEN      Culture result: PENDING    CBC WITH AUTOMATED DIFF    Collection Time: 09/03/19 6:38 PM   Result Value Ref Range    WBC 10.8 3.6 - 11.0 K/uL    RBC 3.76 (L) 3.80 - 5.20 M/uL    HGB 11.4 (L) 11.5 - 16.0 g/dL    HCT 35.1 35.0 - 47.0 %    MCV 93.4 80.0 - 99.0 FL    MCH 30.3 26.0 - 34.0 PG    MCHC 32.5 30.0 - 36.5 g/dL    RDW 14.0 11.5 - 14.5 %    PLATELET 555 442 - 506 K/uL    MPV 10.2 8.9 - 12.9 FL    NRBC 0.0 0  WBC    ABSOLUTE NRBC 0.00 0.00 - 0.01 K/uL    NEUTROPHILS 86 (H) 32 - 75 %    LYMPHOCYTES 6 (L) 12 - 49 %    MONOCYTES 6 5 - 13 %    EOSINOPHILS 1 0 - 7 %    BASOPHILS 1 0 - 1 %    IMMATURE GRANULOCYTES 0 0.0 - 0.5 %    ABS. NEUTROPHILS 9.4 (H) 1.8 - 8.0 K/UL    ABS. LYMPHOCYTES 0.6 (L) 0.8 - 3.5 K/UL    ABS. MONOCYTES 0.6 0.0 - 1.0 K/UL    ABS. EOSINOPHILS 0.1 0.0 - 0.4 K/UL    ABS. BASOPHILS 0.1 0.0 - 0.1 K/UL    ABS. IMM. GRANS. 0.0 0.00 - 0.04 K/UL    DF AUTOMATED     METABOLIC PANEL, COMPREHENSIVE    Collection Time: 09/03/19  6:38 PM   Result Value Ref Range    Sodium 141 136 - 145 mmol/L    Potassium 3.6 3.5 - 5.1 mmol/L    Chloride 109 (H) 97 - 108 mmol/L    CO2 23 21 - 32 mmol/L    Anion gap 9 5 - 15 mmol/L    Glucose 73 65 - 100 mg/dL    BUN 28 (H) 6 - 20 MG/DL    Creatinine 1.82 (H) 0.55 - 1.02 MG/DL    BUN/Creatinine ratio 15 12 - 20      GFR est AA 34 (L) >60 ml/min/1.73m2    GFR est non-AA 28 (L) >60 ml/min/1.73m2    Calcium 8.9 8.5 - 10.1 MG/DL    Bilirubin, total 0.4 0.2 - 1.0 MG/DL    ALT (SGPT) 15 12 - 78 U/L    AST (SGOT) 13 (L) 15 - 37 U/L    Alk. phosphatase 91 45 - 117 U/L    Protein, total 7.6 6.4 - 8.2 g/dL    Albumin 3.4 (L) 3.5 - 5.0 g/dL    Globulin 4.2 (H) 2.0 - 4.0 g/dL    A-G Ratio 0.8 (L) 1.1 - 2.2     SED RATE (ESR)    Collection Time: 09/03/19  8:34 PM   Result Value Ref Range    Sed rate, automated 41 (H) 0 - 30 mm/hr       Radiologic Studies -   XR FOOT RT MIN 3 V   Final Result   IMPRESSION: No plain radiographic evidence of osteomyelitis. MR can be performed   for further evaluation, as indicated.         CT Results  (Last 48 hours)    None CXR Results  (Last 48 hours)    None            Medical Decision Making   I am the first provider for this patient. I reviewed the vital signs, available nursing notes, past medical history, past surgical history, family history and social history. Vital Signs-Reviewed the patient's vital signs. Patient Vitals for the past 12 hrs:   Temp Pulse Resp BP SpO2   09/03/19 2135  73 16 (!) 201/89 97 %   09/03/19 1546 98.1 °F (36.7 °C) 85 16 182/75 97 %         Records Reviewed: Nursing Notes, Old Medical Records, Previous Radiology Studies and Previous Laboratory Studies    Provider Notes (Medical Decision Making):   Differential diagnosis includes cellulitis, diabetic ulcer, osteomyelitis, dehydration, lecture that abnormality, chronic kidney disease, abscess. 62-year-old female who is an insulin-dependent diabetic with past medical history of cellulitis to right lower extremity presents today with pain and drainage to a pressure ulcer to her right fifth metatarsal.  Patient seen at podiatry office prior to arrival.  Sent to the emergency department to rule out infection by obtaining labs and x-ray. Patient is afebrile and has no white blood cell count. Discussed with attending. Will obtain. Blood cultures, ESR, CRP. Will trial patient on p.o. antibiotics and provided with very strict return precautions. Patient is to follow-up with her podiatrist and wound care closely. ED Course:   Initial assessment performed. The patients presenting problems have been discussed, and they are in agreement with the care plan formulated and outlined with them. I have encouraged them to ask questions as they arise throughout their visit. DISCHARGE NOTE:  Arleene Kussmaul Hicks's  results have been reviewed with her. She has been counseled regarding her diagnosis.   She verbally conveys understanding and agreement of the signs, symptoms, diagnosis, treatment and prognosis and additionally agrees to follow up as recommended with Dr. Radha Chirinos MD in 24 - 48 hours. She also agrees with the care-plan and conveys that all of her questions have been answered. I have also put together some discharge instructions for her that include: 1) educational information regarding their diagnosis, 2) how to care for their diagnosis at home, as well a 3) list of reasons why they would want to return to the ED prior to their follow-up appointment, should their condition change. She and/or family's questions have been answered. I have encouraged them to see the official results in [facility]\" or to retrieve the specifics of their results from medical records. PLAN:  1. Return precautions as discussed  2. Follow-up with providers as directed  3. Medications as prescribed    Return to ED if worse     Diagnosis     Clinical Impression:   1. Pressure injury of skin of right foot, unspecified injury stage    2. Cellulitis of right lower extremity        Discharge Medication List as of 9/3/2019  9:41 PM      START taking these medications    Details   cephALEXin (KEFLEX) 500 mg capsule Take 1 Cap by mouth four (4) times daily for 7 days. , Print, Disp-28 Cap, R-0      HYDROcodone-acetaminophen (NORCO) 5-325 mg per tablet Take 1 Tab by mouth every four (4) hours as needed for Pain for up to 3 days. Max Daily Amount: 6 Tabs., Print, Disp-10 Tab, R-0         CONTINUE these medications which have NOT CHANGED    Details   sertraline (ZOLOFT) 50 mg tablet TAKE ONE AND ONE-HALF (1 & 1/2) TABLET BY MOUTH DAILY, Normal, Disp-45 Tab, R-5      warfarin (COUMADIN) 4 mg tablet Take 1 tablet on Monday a half tablet the other 6 days. , No Print, Disp-60 Tab, R-5      bumetanide (BUMEX) 1 mg tablet Take 2 mg by mouth daily. , Historical Med      cloNIDine HCl (CATAPRES) 0.3 mg tablet Take 0.6 mg by mouth nightly., Historical Med      metoprolol succinate (TOPROL-XL) 100 mg tablet TAKE TWO TABLETS BY MOUTH DAILY, Normal, Disp-60 Tab, R-9      hydrALAZINE (APRESOLINE) 100 mg tablet TAKE ONE TABLET BY MOUTH THREE TIMES A DAY, Normal, Disp-90 Tab, R-1      doxazosin (CARDURA) 4 mg tablet TAKE ONE TABLET BY MOUTH ONCE NIGHTLY, Normal, Disp-30 Tab, R-10      busPIRone (BUSPAR) 10 mg tablet TAKE ONE TABLET BY MOUTH TWICE A DAY, Normal, Disp-60 Tab, R-10      potassium chloride SR (KLOR-CON 10) 10 mEq tablet TAKE ONE TABLET BY MOUTH DAILY, Normal, Disp-90 Tab, R-3      insulin glargine (LANTUS U-100 INSULIN) 100 unit/mL injection 20 Units by SubCUTAneous route daily. , No Print, Disp-10 mL, R-5      metolazone (ZAROXOLYN) 5 mg tablet Take 1 Tab by mouth daily as needed (take if develop increased LE edema, weight gain > 5 pounds. )., Print, Disp-30 Tab, R-1      cholecalciferol, VITAMIN D3, (VITAMIN D3) 5,000 unit tab tablet Take 5,000 Units by mouth daily. , Historical Med      vitamin A 8,000 unit capsule Take 8,000 Units by mouth daily. , Historical Med      insulin lispro (HUMALOG) 100 unit/mL kwikpen 2 units with dinner for sugars over 200, Historical Med, Disp-1 Package, R-0      cyanocobalamin (VITAMIN B-12) 1,000 mcg sublingual tablet Take 1,000 mcg by mouth daily. , Historical Med             Follow-up Information     Follow up With Specialties Details Why Contact Info    Lida Pitts DPM Foot and Ankle Surgery Call today  [address] [facility]  [address]  [address]  [phone]                This note will not be viewable in [address]

## 2019-09-03 NOTE — WOUND CARE
09/03/19 1455   Wound Foot Left;Plantar   Date First Assessed/Time First Assessed: 06/21/19 0857   Present on Hospital Admission: Yes  Primary Wound Type: Diabetic  Location: Foot  Wound Location Orientation: Left;Plantar   Dressing Type Applied Silver products; Alginate;Gauze;Gauze wrap (arnie); Special tape (comment)   Wound Procedure Type Debridement- Surgical   Procedure Time Out 1455   Consent Obtained  Yes   Procedure Bleeding Minimal   Procedure Hemostasis  Pressure   Procedure Instrument  Curette   Debridement Procedure Performed by Dr Harrison Bryan   Post-Procedure Length (cm) 1.8 cm   Post-Procedure Width (cm) 2 cm   Post-Procedure Depth (cm) 0.2 cm   Post-Procedure Volume (cm^3) 0.72 cm^3   Post-Procedure Surface Area (cm^2) 3.6 cm^2   Post Procedure Pain Scale Numeric 0/10   Procedure Tolerated Well   Wound Foot Right;Plantar;Lateral 07/30/19   Date First Assessed/Time First Assessed: 07/30/19 1034   Present on Hospital Admission: Yes  Wound Approximate Age at First Assessment (Weeks): 1 weeks  Primary Wound Type: Diabetic Ulcer  Location: Foot  Wound Location Orientation: Right;Plantar;Late. .. Dressing Type Applied Silver products; Alginate;Gauze;Gauze wrap (arnie); Special tape (comment)   Wound Breast Left   Date First Assessed/Time First Assessed: 06/07/19 0834   Location: Breast  Wound Location Orientation: Left   Dressing Type Applied Vacuum dressing  (Snap vac @ 125)     Patient c/o pain on R lat plantar foot. Patient to go to ER for Blood draw and xray of R foot.

## 2019-09-03 NOTE — WOUND CARE
09/03/19 1427   Wound Foot Left;Plantar   Date First Assessed/Time First Assessed: 06/21/19 0857   Present on Hospital Admission: Yes  Primary Wound Type: Diabetic  Location: Foot  Wound Location Orientation: Left;Plantar   Dressing Status Removed   Dressing Type   (aquacel ag, foam. TCC)   Wound Length (cm) 1.8 cm   Wound Width (cm) 2 cm   Wound Depth (cm) 0.1 cm   Wound Volume (cm^3) 0.36 cm^3   Condition of Base Pink   Condition of Edges Calloused   Drainage Amount Moderate   Drainage Color Serosanguinous   Wound Odor None   Sandra-wound Assessment Intact   Cleansing and Cleansing Agents  Normal saline   Wound Foot Right;Plantar;Lateral 07/30/19   Date First Assessed/Time First Assessed: 07/30/19 1034   Present on Hospital Admission: Yes  Wound Approximate Age at First Assessment (Weeks): 1 weeks  Primary Wound Type: Diabetic Ulcer  Location: Foot  Wound Location Orientation: Right;Plantar;Late. ..    Dressing Status Removed   Dressing Type   (aquacel ag, gauze RG)   Wound Length (cm) 1 cm   Wound Width (cm) 0.8 cm   Wound Depth (cm) 0.2 cm   Wound Volume (cm^3) 0.16 cm^3   Condition of Base Pink   Condition of Edges Calloused   Undermining (cm)   (none)   Drainage Amount Moderate   Drainage Color Serosanguinous   Wound Odor None   Sandra-wound Assessment Intact   Cleansing and Cleansing Agents  Normal saline   Wound Breast Left   Date First Assessed/Time First Assessed: 06/07/19 0834   Location: Breast  Wound Location Orientation: Left   Dressing Status Removed   Dressing Type Vacuum dressing     Visit Vitals  /63 (BP 1 Location: Left arm, BP Patient Position: Sitting)   Pulse 80   Temp 98.7 °F (37.1 °C)   Resp 18     LLE Peripheral Vascular   Capillary Refill: Less than/equal to 3 seconds (09/03/19 1426)  Color: Acrocyanosis (09/03/19 1426)  Temperature: Warm (09/03/19 1426)  Sensation: Present (09/03/19 1426)  Pedal Pulse: Palpable (09/03/19 1426)

## 2019-09-04 LAB — CRP SERPL HS-MCNC: 7.1 MG/L

## 2019-09-04 NOTE — DISCHARGE INSTRUCTIONS
Patient Education        Cellulitis: Care Instructions  Your Care Instructions    Cellulitis is a skin infection caused by bacteria, most often strep or staph. It often occurs after a break in the skin from a scrape, cut, bite, or puncture, or after a rash. Cellulitis may be treated without doing tests to find out what caused it. But your doctor may do tests, if needed, to look for a specific bacteria, like methicillin-resistant Staphylococcus aureus (MRSA). The doctor has checked you carefully, but problems can develop later. If you notice any problems or new symptoms, get medical treatment right away. Follow-up care is a key part of your treatment and safety. Be sure to make and go to all appointments, and call your doctor if you are having problems. It's also a good idea to know your test results and keep a list of the medicines you take. How can you care for yourself at home? · Take your antibiotics as directed. Do not stop taking them just because you feel better. You need to take the full course of antibiotics. · Prop up the infected area on pillows to reduce pain and swelling. Try to keep the area above the level of your heart as often as you can. · If your doctor told you how to care for your wound, follow your doctor's instructions. If you did not get instructions, follow this general advice:  ? Wash the wound with clean water 2 times a day. Don't use hydrogen peroxide or alcohol, which can slow healing. ? You may cover the wound with a thin layer of petroleum jelly, such as Vaseline, and a nonstick bandage. ? Apply more petroleum jelly and replace the bandage as needed. · Be safe with medicines. Take pain medicines exactly as directed. ? If the doctor gave you a prescription medicine for pain, take it as prescribed. ? If you are not taking a prescription pain medicine, ask your doctor if you can take an over-the-counter medicine.   To prevent cellulitis in the future  · Try to prevent cuts, scrapes, or other injuries to your skin. Cellulitis most often occurs where there is a break in the skin. · If you get a scrape, cut, mild burn, or bite, wash the wound with clean water as soon as you can to help avoid infection. Don't use hydrogen peroxide or alcohol, which can slow healing. · If you have swelling in your legs (edema), support stockings and good skin care may help prevent leg sores and cellulitis. · Take care of your feet, especially if you have diabetes or other conditions that increase the risk of infection. Wear shoes and socks. Do not go barefoot. If you have athlete's foot or other skin problems on your feet, talk to your doctor about how to treat them. When should you call for help? Call your doctor now or seek immediate medical care if:    · You have signs that your infection is getting worse, such as:  ? Increased pain, swelling, warmth, or redness. ? Red streaks leading from the area. ? Pus draining from the area. ? A fever.     · You get a rash.    Watch closely for changes in your health, and be sure to contact your doctor if:    · You do not get better as expected. Where can you learn more? Go to http://dona-windy.info/. Tamara Alvarenga in the search box to learn more about \"Cellulitis: Care Instructions. \"  Current as of: April 1, 2019  Content Version: 12.1  © 7602-4276 [employer]. Care instructions adapted under license by [employer] (which disclaims liability or warranty for this information). If you have questions about a medical condition or this instruction, always ask your healthcare professional. [place] [address] any warranty or liability for your use of this information. Patient Education        Pressure Injuries: Care Instructions  Your Care Instructions    A pressure injury on the skin is caused by constant pressure to that area.  These injuries--also called decubitus ulcers or bedsores--may happen when you lie in bed or sit in a wheelchair for a long time. The constant pressure blocks the blood supply to the skin. This causes skin cells to die and creates a sore. Pressure injuries usually occur over bony areas, such as the hips, lower back, elbows, heels, and shoulders. They also can occur in places where the skin folds over on itself. You may have mild redness or open sores that are harder to heal.  Good care at home can help heal pressure injuries. You or your caregiver needs to check your skin every day for sores. You need good nutrition and plenty of fluids to keep your skin healthy and prevent new pressure injuries. Follow-up care is a key part of your treatment and safety. Be sure to make and go to all appointments, and call your doctor if you are having problems. It's also a good idea to know your test results and keep a list of the medicines you take. How can you care for yourself at home? · If your doctor prescribed a medicated ointment or cream, use it exactly as prescribed. Call your doctor if you think you are having a problem with your medicine. · Wash pressure injuries every day, or as often as your doctor recommends. Most tap water is safe, but follow the advice of your doctor or nurse. He or she may recommend that you use a saline solution. This is a salt and water solution that you can buy over the counter. · Put on bandages as your doctor or wound care specialist says. · Keep healthy tissue around the sore clean and dry. · Check your skin every day for sores (or have a caregiver do it). · If you know what is causing the pressure that caused the sore, find a way to remove that pressure. To prevent pressure injuries  · Change your position or have your caregiver help you change your position often. You may need to do this every 2 hours if you are in bed or every 15 minutes if you are in a wheelchair. This lowers the chance of making sores worse and getting new sores.   · Use special mattresses or other support. These may include low-pressure mattresses or cushions made of foam that can be filled with air, water, beads, or fiber. · Eat healthy foods with plenty of protein to help heal damaged skin and to help new skin grow. · Try to stay at a healthy weight. Being overweight can lead to more pressure on your skin. · Do not slide across sheets or slump in a chair or bed. · Do not smoke. Smoking dries the skin and reduces its blood supply. If you need help quitting, talk to your doctor about stop-smoking programs and medicines. These can increase your chances of quitting for good. When should you call for help? Call your doctor now or seek immediate medical care if:    · You have signs of infection, such as:  ? Increased pain, swelling, warmth, or redness. ? Red streaks leading from the sore. ? Pus draining from the sore. ? A fever.    Watch closely for changes in your health, and be sure to contact your doctor if:    · Your pressure injuries are not healing.     · You have new pressure injuries.     · You need help changing positions in bed or in a chair.     · Your caregiver needs help to move you. Where can you learn more? Go to http://dona-windy.info/. Enter F114 in the search box to learn more about \"Pressure Injuries: Care Instructions. \"  Current as of: September 26, 2018  Content Version: 12.1  © 8405-4207 [employer]. Care instructions adapted under license by [employer] (which disclaims liability or warranty for this information). If you have questions about a medical condition or this instruction, always ask your healthcare professional. [address] any warranty or liability for your use of this information.

## 2019-09-04 NOTE — ED NOTES
Tara Brooks has reviewed discharge instructions with the patient. The patient verbalized understanding. Pt ambulated out of department in no distress. VSS.

## 2019-09-05 LAB
BACTERIA SPEC CULT: ABNORMAL
GRAM STN SPEC: ABNORMAL
GRAM STN SPEC: ABNORMAL
SERVICE CMNT-IMP: ABNORMAL

## 2019-09-08 LAB
BACTERIA SPEC CULT: NORMAL
SERVICE CMNT-IMP: NORMAL

## 2019-09-10 ENCOUNTER — HOSPITAL ENCOUNTER (OUTPATIENT)
Dept: WOUND CARE | Age: 59
Discharge: HOME | End: 2019-09-10
Payer: COMMERCIAL

## 2019-09-10 VITALS
DIASTOLIC BLOOD PRESSURE: 76 MMHG | HEART RATE: 86 BPM | TEMPERATURE: 98.4 F | SYSTOLIC BLOOD PRESSURE: 166 MMHG | RESPIRATION RATE: 18 BRPM

## 2019-09-10 PROCEDURE — 74011000250 HC RX REV CODE- 250: Performed by: PODIATRIST

## 2019-09-10 PROCEDURE — 11042 DBRDMT SUBQ TIS 1ST 20SQCM/<: CPT

## 2019-09-10 RX ADMIN — Medication: at 13:15

## 2019-09-10 NOTE — WOUND CARE
09/10/19 1348   Wound Foot Left;Plantar   Date First Assessed/Time First Assessed: 06/21/19 0857   Present on Hospital Admission: Yes  Primary Wound Type: Diabetic  Location: Foot  Wound Location Orientation: Left;Plantar   Cleansing and Cleansing Agents  Normal saline   Dressing Type Applied Alginate;Silver products;4 x 4;Gauze wrap (arnie)  (Aquacel ag, 4x4s, roll gauze.)   Wound Procedure Type Debridement- Surgical   Procedure Time Out 9212   Consent Obtained  Yes   Procedure Bleeding Minimal   Procedure Hemostasis  Pressure   Type of Tissue Removed  Devitalized   Procedure Instrument  Blade  (#15)   Procedure Pain Scale Numeric 2/10   Debridement Procedure Performed by Dr. Sherlyn Sutton   Post-Procedure Length (cm) 1.5 cm   Post-Procedure Width (cm) 1.8 cm   Post-Procedure Surface Area (cm^2) 2.7 cm^2   Post Procedure Pain Scale Numeric 0/10   Procedure Tolerated Well   Wound Foot Right;Plantar;Lateral 07/30/19   Date First Assessed/Time First Assessed: 07/30/19 1034   Present on Hospital Admission: Yes  Wound Approximate Age at First Assessment (Weeks): 1 weeks  Primary Wound Type: Diabetic Ulcer  Location: Foot  Wound Location Orientation: Right;Plantar;Late. ..    Cleansing and Cleansing Agents  Normal saline   Dressing Type Applied Alginate;Silver products;4 x 4;Gauze wrap (arnie)  (Aquacel ag, 4x4s, roll gauze)   Wound Procedure Type Debridement- Surgical   Procedure Time Out 4085   Consent Obtained  Yes   Procedure Bleeding Minimal   Procedure Hemostasis  Pressure   Type of Tissue Removed  Devitalized   Procedure Instrument  Blade  (#15)   Procedure Pain Scale Numeric 0/10   Debridement Procedure Performed by Dr. Sherlyn Sutton   Post-Procedure Length (cm) 1 cm   Post-Procedure Width (cm) 0.7 cm   Post-Procedure Surface Area (cm^2) 0.7 cm^2   Procedure Tolerated Well

## 2019-09-10 NOTE — WOUND CARE
09/10/19 1314   Wound Foot Left;Plantar   Date First Assessed/Time First Assessed: 06/21/19 0857   Present on Hospital Admission: Yes  Primary Wound Type: Diabetic  Location: Foot  Wound Location Orientation: Left;Plantar   Dressing Status Removed   Dressing Type Aquacel;Silver products;Gauze;Gauze wrap (arnie)   Wound Length (cm) 1.5 cm   Wound Width (cm) 1.8 cm   Wound Depth (cm) 0.2 cm   Wound Volume (cm^3) 0.54 cm^3   Condition of Base Pink; Other (comment)  (Red)   Condition of Edges Calloused   Drainage Amount Moderate   Drainage Color Serosanguinous   Wound Odor None   Sandra-wound Assessment Intact; Maceration   Cleansing and Cleansing Agents  Normal saline   Wound Foot Right;Plantar;Lateral 07/30/19   Date First Assessed/Time First Assessed: 07/30/19 1034   Present on Hospital Admission: Yes  Wound Approximate Age at First Assessment (Weeks): 1 weeks  Primary Wound Type: Diabetic Ulcer  Location: Foot  Wound Location Orientation: Right;Plantar;Late. ..    Dressing Status Removed   Dressing Type Aquacel;Silver products   Wound Length (cm) 1 cm   Wound Width (cm) 0.7 cm   Wound Depth (cm) 0.2 cm   Wound Volume (cm^3) 0.14 cm^3   Condition of Base Pink   Condition of Edges Calloused   Drainage Amount Moderate   Drainage Color Serosanguinous   Wound Odor None   Sandra-wound Assessment Intact   Cleansing and Cleansing Agents  Normal saline   Wound Breast Left   Date First Assessed/Time First Assessed: 06/07/19 0834   Location: Breast  Wound Location Orientation: Left   Dressing Status Removed   Dressing Type Vacuum dressing;Negative pressure wound therapy  (Snap Vac)   Condition of Base Granulation   Condition of Edges Open   Drainage Amount Moderate   Drainage Color Serosanguinous   Wound Odor None   Cleansing and Cleansing Agents  Normal saline   Dressing Type Applied Vacuum dressing;Negative pressure wound therapy  (Snap vac)

## 2019-09-10 NOTE — PROGRESS NOTES
Patient presents to wound clinic for: Sona Carter is a 60 y.o. female who presents for wound care of bilateral plantar foot ulcers. Patient currently being treated by Dr. Shonda Larry for a wound of the left breast, and was referred to me for further offloading and wound care recommendations of the bilateral heel ulcers. The ulcer of the plantar left heel occurred first and patient was placed in a heel offloading shoe. She then subsequently developed an ulceration of the plantar 5th met base of the right foot. She notes that the wounds are painful when ambulating. She has been wearing her sneakers with her offloading pads. Has been feeling much better since last week. X-rays last week were negative of osteomyelitis. No current infection noted to feet. Denies pain to feet. Denies f/c/n/v/d.     Pertinent Medical History:  Past Medical History:   Diagnosis Date    Acquired lymphedema     Right arm    Arrhythmia     Asthma     Atrial fibrillation (HCC)     Dr. Fabienne Contreras [facility] and Dr. Kennedy Bryant Atrial flutter ([facility] [address]     [facility])     bilateral breast    Chronic kidney disease     Diabetes mellitus type II     Dizziness     Essential hypertension     Hyperlipidemia     Hypertension     Long term (current) use of anticoagulants     Morbid obesity ([facility] [address] 3/14/2012    Osteoarthritis     Pacemaker     Sick sinus syndrome ([facility] [address]      Past Surgical History:   Procedure Laterality Date    ABDOMEN SURGERY PROC UNLISTED      gastric bypass    GASTRIC BYPASS,OBESE<150CM SAW-EN-Y  7/08    [facility]    HX AFIB ABLATION      HX CARPAL TUNNEL RELEASE      HX CERVICAL FUSION  1994    HX DILATION AND CURETTAGE  1992    HX MASTECTOMY  1998    RIGHT MODIFIED RADICAL     HX MOHS PROCEDURES  1995    right    HX ORTHOPAEDIC      ight knee surgery    HX PACEMAKER      IR INSERT TUNL CVC W PORT OVER 5 YEARS      [address] [place] [address]  8.21.07    MAMI [facility] [place] TRABECULOPLASTY BY LASER SURGERY      US GUIDE ASP OVARIAN CYST ABD APPROACH  8.21.07    RIGHT      Prior to Admission medications    Medication Sig Start Date End Date Taking? Authorizing Provider   cephALEXin (KEFLEX) 500 mg capsule Take 1 Cap by mouth four (4) times daily for 7 days. 9/3/19 9/10/19  [employer] LORNA [facility]   sertraline (ZOLOFT) 50 mg tablet TAKE ONE AND ONE-HALF (1 & 1/2) TABLET BY MOUTH DAILY 8/22/19   Pina Duffy MD   warfarin (COUMADIN) 4 mg tablet Take 1 tablet on Monday a half tablet the other 6 days. 8/15/19   Pina Duffy MD   bumetanide (BUMEX) 1 mg tablet Take 2 mg by mouth daily. Provider, Historical   cloNIDine HCl (CATAPRES) 0.3 mg tablet Take 0.6 mg by mouth nightly. Provider, Historical   metoprolol succinate (TOPROL-XL) 100 mg tablet TAKE TWO TABLETS BY MOUTH DAILY 7/12/19   Pina Duffy MD   hydrALAZINE (APRESOLINE) 100 mg tablet TAKE ONE TABLET BY MOUTH THREE TIMES A DAY 6/24/19   Juan Francisco Olmstead NP   doxazosin (CARDURA) 4 mg tablet TAKE ONE TABLET BY MOUTH ONCE NIGHTLY 6/14/19   Pina Duffy MD   busPIRone (BUSPAR) 10 mg tablet TAKE ONE TABLET BY MOUTH TWICE A DAY 5/24/19   Pina Duffy MD   potassium chloride SR (KLOR-CON 10) 10 mEq tablet TAKE ONE TABLET BY MOUTH DAILY 4/22/19   Yusuf Vasquez MD   insulin glargine (LANTUS U-100 INSULIN) 100 unit/mL injection 20 Units by SubCUTAneous route daily. 11/13/18   Pina Duffy MD   metolazone (ZAROXOLYN) 5 mg tablet Take 1 Tab by mouth daily as needed (take if develop increased LE edema, weight gain > 5 pounds. ). 4/10/14   Margaret Mancini NP   cholecalciferol, VITAMIN D3, (VITAMIN D3) 5,000 unit tab tablet Take 5,000 Units by mouth daily. Provider, Historical   vitamin A 8,000 unit capsule Take 8,000 Units by mouth daily.     Provider, Historical   insulin lispro (HUMALOG) 100 unit/mL kwikpen 2 units with dinner for sugars over 200 1/3/14 Bola Anderesn MD   cyanocobalamin (VITAMIN B-12) 1,000 mcg sublingual tablet Take 1,000 mcg by mouth daily.     Provider, Historical     Allergies   Allergen Reactions    Ace Inhibitors Cough    Amlodipine Swelling    Avapro [Irbesartan] Unable to Obtain    Bactrim [Sulfamethoxazole-Trimethoprim] Other (comments)     Sore mouth    Captopril Cough    Carvedilol Diarrhea    Contrast Agent [Iodine] Itching    Fish Containing Products Hives    Morphine Nausea and Vomiting and Swelling    Penicillins Hives    Pravachol [Pravastatin] Nausea Only    Seafood [Shellfish Containing Products] Hives    Singulair [Montelukast] Other (comments)     palpitations     Family History   Problem Relation Age of Onset    Cancer Mother     Heart Disease Mother     Alcohol abuse Father     Diabetes Brother     Hypertension Brother      Social History     Socioeconomic History    Marital status:      Spouse name: Not on file    Number of children: Not on file    Years of education: Not on file    Highest education level: Not on file   Occupational History    Not on file   Social Needs    Financial resource strain: Not on file    Food insecurity:     Worry: Not on file     Inability: Not on file    Transportation needs:     Medical: Not on file     Non-medical: Not on file   Tobacco Use    Smoking status: Never Smoker    Smokeless tobacco: Never Used   Substance and Sexual Activity    Alcohol use: Yes     Comment: rare    Drug use: No    Sexual activity: Not on file   Lifestyle    Physical activity:     Days per week: Not on file     Minutes per session: Not on file    Stress: Not on file   Relationships    Social connections:     Talks on phone: Not on file     Gets together: Not on file     Attends Religion service: Not on file     Active member of club or organization: Not on file     Attends meetings of clubs or organizations: Not on file     Relationship status: Not on file    Intimate partner violence:     Fear of current or ex partner: Not on file     Emotionally abused: Not on file     Physically abused: Not on file     Forced sexual activity: Not on file   Other Topics Concern    Not on file   Social History Narrative    Not on file       Vitals:    09/10/19 1259   BP: 166/76   Pulse: 86   Resp: 18   Temp: 98.4 °F (36.9 °C)       Review of Systems:    Gen: No fever, chills, malaise, weight loss/gain. Heent: No headache, rhinorrhea, epistaxis, ear pain, hearing loss, sinus pain, neck pain/stiffness, sore throat. Heart: No chest pain, palpitations, CHOW, pnd, or orthopnea. Resp: No cough, hemoptysis, wheezing and shortness of breath. GI: No nausea, vomiting, diarrhea, constipation, melena or hematochezia. : No urinary obstruction, dysuria or hematuria. Derm: see below  Musc/skeletal: no bone or joint complains. Vasc: No edema, cyanosis or claudication. Endo: No heat/cold intolerance, no polyuria,polydipsia or polyphagia. Neuro: No unilateral weakness, numbness, tingling. No seizures. Heme: No easy bruising or bleeding. Wound Description:  Refer to nursing notes for measurements. Ulcer Debridement    Left plantar foot wound    Character of Ulcer: Improved    Indication for Debridement: Slough, Exudate, Abnormal wound edge and Abnormal Wound base     Pre debridement measurements: 1.5 cm x 1.8 cm x 0.2 cm    Risks of procedure were discussed with patient. Consent has been signed.      Anesthetic: Lidocaine 4% topical cream     Level of Debridement: Subctaneous Tissue     Tissue and/or Material removed: Exudate, Fibrin/ Slough and Subcutaneous    Type of Tissue: Non- Viable      Pre-debridement severity: Fat Layer Exposed     Post debridement severity: Fat Layer exposed    Instrument(s) used: Scalpel    Bleeding controlled with: Pressure    Estimated blood loss: Minimal    Pre debridement measurements: 1.9 cm x 2.1 cm x 0.2 cm    Post procedural pain: mild    Patient tolerated procedure well. Right  plantar foot wound    Character of Ulcer: Improved    Indication for Debridement: Slough, Exudate, Abnormal wound edge and Abnormal Wound base     Pre debridement measurements: 1 cm x 0.7 cm x 0.2 cm    Risks of procedure were discussed with patient. Consent has been signed. Anesthetic: Lidocaine 4% topical cream     Level of Debridement: Subctaneous Tissue     Tissue and/or Material removed: Exudate, Fibrin/ Slough and Subcutaneous    Type of Tissue: Non- Viable      Pre-debridement severity: Fat Layer Exposed     Post debridement severity: Fat Layer exposed    Instrument(s) used: Scalpel    Bleeding controlled with: Pressure    Estimated blood loss: Minimal    Postdebridement measurements: 1.1 cm x 0.7 cm x 0.2 cm    Post procedural pain: mild    Patient tolerated procedure well. Infection: yes to right foot    Edema: mild edema    Lower Extremity Circulation   Reviewed patient's recent ABIs. Shows decrease from when the     Offloading: Offloading padding to shoes  Assessment:  1. Diabetic plantar heel ulcer left foot  2. Diabetic ulcer right foot-plantar 5th met base    Plan:   -Patient seen and evaluated as noted above.  -Wound debridement performed.  -Suspect that TCC to LLE is causing excessive pressure transfer to the RLE which likely caused insertional tendonitis at the right 5th met base. While offloading with padding in shoes is not idea for either foot wound, I suspect it is the best option for allowing for equal pressure distribution between both feet.  -Alginate, gauze dressing to both feet.  -Continue offloading padding in both shoes to allow equal pressure distribution. Patient will need custom shoes once ulcers are healed.   -Follow-up next week with Dr. Chaparrita Pizano

## 2019-09-13 ENCOUNTER — HOSPITAL ENCOUNTER (OUTPATIENT)
Dept: WOUND CARE | Age: 59
Discharge: HOME | End: 2019-09-13
Payer: COMMERCIAL

## 2019-09-13 VITALS
RESPIRATION RATE: 18 BRPM | TEMPERATURE: 96.7 F | HEART RATE: 82 BPM | SYSTOLIC BLOOD PRESSURE: 188 MMHG | DIASTOLIC BLOOD PRESSURE: 81 MMHG

## 2019-09-13 PROCEDURE — 97607 NEG PRS WND THR NDME<=50SQCM: CPT

## 2019-09-13 NOTE — WOUND CARE
Visit Vitals  /81   Pulse 82   Temp 96.7 °F (35.9 °C)   Resp 18     LLE Peripheral Vascular   Capillary Refill: Less than/equal to 3 seconds (09/13/19 1140)  Color: Appropriate for race (09/13/19 1140)  Temperature: Warm (09/13/19 1140)  Sensation: Present (09/13/19 1140)  Pedal Pulse: Palpable (09/13/19 1140)  RLE Peripheral Vascular   Capillary Refill: Less than/equal to 3 seconds (09/13/19 1140)  Color: Appropriate for race (09/13/19 1140)  Temperature: Warm (09/13/19 1140)  Sensation: Present (09/13/19 1140)  Pedal Pulse: Palpable (09/13/19 1140)  In for nurse visit, has no questions about her care. . To return to wound center in 1 week.

## 2019-09-20 ENCOUNTER — HOSPITAL ENCOUNTER (OUTPATIENT)
Dept: WOUND CARE | Age: 59
Discharge: HOME | End: 2019-09-20
Payer: COMMERCIAL

## 2019-09-20 VITALS
DIASTOLIC BLOOD PRESSURE: 74 MMHG | SYSTOLIC BLOOD PRESSURE: 169 MMHG | TEMPERATURE: 97.4 F | RESPIRATION RATE: 16 BRPM | HEART RATE: 85 BPM

## 2019-09-20 PROCEDURE — 74011000250 HC RX REV CODE- 250: Performed by: OTOLARYNGOLOGY

## 2019-09-20 PROCEDURE — 11042 DBRDMT SUBQ TIS 1ST 20SQCM/<: CPT

## 2019-09-20 RX ADMIN — Medication: at 08:59

## 2019-09-20 NOTE — PROGRESS NOTES
[address] 
WOUND CARE PROGRESS NOTE Name:  Meryl Pascual 
MR#:  [id number] :  1960 ACCOUNT #:  [de-identified] DATE OF SERVICE:  2019 SUBJECTIVE:  The patient returns for treatment of three wounds. She has a diabetic foot ulcer left heel E11.621, which extends down to the fat L97.422, along with a diabetic foot ulcer of the right lateral foot L97.412, which extends down to fat, and an open wound of the left breast, S21.002D The patient was seen by Dr. Rhonda Lackey on 09/10/2019. She offloaded both foot wounds with extra padding in the patient's shoes. The dressing has been Aquacel Ag gauze and roll gauze, which has been changed twice a week. She has had no problems since last seen. Her last course of antibiotics was from 2019 until 09/10/2019 and that was for cellulitis of the right leg. OBJECTIVE: 
VITAL SIGNS:  Her temperature today is 97.4, pulse 85, respirations 16, blood pressure 169/74. WOUND EXAMINATION:  The wound of the right lateral foot is macerated, it is smaller in size and measures 0.8 x 0.5 x 0.2 cm and is covered with a layer of slough. The wound of left plantar heel wound is 1.5 x 1.6 x 0.2 cm and is surrounded by devitalized tissue in the form of macerated callus and the surface of the wound is covered with slough. The wound of left breast is clean and measures 1.3 x 2.8 x 0.1 cm and is 100% healthy, pink granulation tissue. It was recommended that the wounds of the feet be debrided of all devitalized tissue and the calluses pared down. I explained the risks and benefits. The patient understood and wished to proceed. Therefore, topical Xylocaine 4% gel was applied for 10 minutes. Using a 5-mm ring curette, the wounds were debrided down to healthy subcutaneous tissue. The edges of the wound were stripped out to get back to healthy actively bleeding skin edges.   The post debridement dimensions of the right plantar wound were 1 x 0.8 x 0.2 cm and dimensions of left plantar wound after debridement were 1.5 x 1.8 x 0.2 cm. The edges of the wounds were then cauterized with silver nitrate and the surfaces of the wounds were cauterized with silver nitrate to create involution of all hypertrophic tissue. This was then irrigated clear with saline. ASSESSMENT AND PLAN:  We are going to make slight wound care changes today. We are going to hold the wound vacuum-assisted closure to the left breast, and placed moistened Endoform over the wound followed by a gentle border. We are going to continue Aquacel Ag gauze and roll gauze to the wounds of the feet, but due to maceration, the patient will change these dressings every other day. She will continue to offload these wounds by wearing her shoes and felt cutouts. She is going to see Dr. Pj Pritchett on Tuesday 09/24/2019. At that time, she will talk to Dr. Hari Grijalva about getting a prescription for either diabetic shoes or insoles so that an appointment can be made to get these obtained as soon as the patient has healed from her diabetic foot ulcer wounds. The nurses will change her breast dressing at that time and give her extra supplies and I will see the patient again in two weeks from today which will be 10/04/2019. All questions were answered. Her condition on discharge is stable. Pricila Muñiz MD 
 
 
[id number] 
D:  09/20/2019 9:31 
T:  09/20/2019 9:40 JOB #:  [id number]

## 2019-09-20 NOTE — WOUND CARE
09/20/19 0940 Wound Foot Right;Plantar;Lateral 07/30/19 Date First Assessed/Time First Assessed: 07/30/19 1034   Present on Hospital Admission: Yes  Wound Approximate Age at First Assessment (Weeks): 1 weeks  Primary Wound Type: Diabetic Ulcer  Location: Foot  Wound Location Orientation: Right;Plantar;Late. .. Dressing Type Applied Silver products; Alginate;Gauze;Gauze wrap (arnie); Special tape (comment) Wound Foot Left;Plantar Date First Assessed/Time First Assessed: 06/21/19 0857   Present on Hospital Admission: Yes  Primary Wound Type: Diabetic  Location: Foot  Wound Location Orientation: Left;Plantar Dressing Type Applied Silver products; Alginate;Gauze;Gauze wrap (arnie); Special tape (comment) Wound Breast Left Date First Assessed/Time First Assessed: 06/07/19 0834   Location: Breast  Wound Location Orientation: Left Dressing Type Applied Collagens/cell matrix 
(endoform, border foam)

## 2019-09-20 NOTE — WOUND CARE
Visit Vitals /74 Pulse 85 Temp 97.4 °F (36.3 °C) Resp 16 LLE Peripheral Vascular Capillary Refill: Less than/equal to 3 seconds (09/20/19 0848) Color: Appropriate for race (09/20/19 0848) Temperature: Warm (09/20/19 0848) Sensation: Present (09/20/19 0848) Pedal Pulse: Palpable (09/20/19 0848) RLE Peripheral Vascular Capillary Refill: Less than/equal to 3 seconds (09/20/19 0848) Color: Appropriate for race (09/20/19 0848) Temperature: Warm (09/20/19 0848) Sensation: Present (09/20/19 0848) Pedal Pulse: Palpable (09/20/19 0848)

## 2019-09-20 NOTE — WOUND CARE
09/20/19 1516 Wound Foot Right;Plantar;Lateral 07/30/19 Date First Assessed/Time First Assessed: 07/30/19 1034   Present on Hospital Admission: Yes  Wound Approximate Age at First Assessment (Weeks): 1 weeks  Primary Wound Type: Diabetic Ulcer  Location: Foot  Wound Location Orientation: Right;Plantar;Late. .. Dressing Status Removed Dressing Type Aquacel;Gauze Wound Length (cm) 0.8 cm Wound Width (cm) 0.5 cm Wound Depth (cm) 0.2 cm Wound Volume (cm^3) 0.08 cm^3 Condition of Base Pink Condition of Edges Calloused; Open Drainage Amount Moderate Drainage Color Serosanguinous Wound Odor None Sandra-wound Assessment Intact Cleansing and Cleansing Agents  Normal saline Wound Procedure Type Debridement- Surgical  
Procedure Time Out 0900 Consent Obtained  Yes Procedure Bleeding Minimal  
Procedure Hemostasis  Pressure Type of Tissue Removed  Devitalized Procedure Instrument  Curette Procedure Pain Scale Numeric 0/10 Debridement Procedure Performed by Dr. Alexandra Teague Post-Procedure Length (cm) 1 cm Post-Procedure Width (cm) 0.8 cm Post-Procedure Depth (cm) 0.2 cm Post-Procedure Volume (cm^3) 0.16 cm^3 Post-Procedure Surface Area (cm^2) 0.8 cm^2 Procedure Tolerated Well Wound Foot Left;Plantar Date First Assessed/Time First Assessed: 06/21/19 0857   Present on Hospital Admission: Yes  Primary Wound Type: Diabetic  Location: Foot  Wound Location Orientation: Left;Plantar Dressing Status Removed Dressing Type  
(gauze, RG) Wound Length (cm) 1.5 cm Wound Width (cm) 1.6 cm Wound Depth (cm) 0.2 cm Wound Volume (cm^3) 0.48 cm^3 Condition of Base Pink Condition of Edges Calloused Undermining (cm) 0.3 cm Direction of Undermining  
(9:00-4:00) Drainage Amount Moderate Drainage Color Serosanguinous Wound Odor None Sandra-wound Assessment Intact Cleansing and Cleansing Agents  Normal saline Wound Procedure Type Debridement- Surgical  
 Procedure Time Out 7890 Consent Obtained  Yes Procedure Bleeding Moderate Procedure Hemostasis  Silver Nitrate Type of Tissue Removed  Devitalized Procedure Instrument  Curette Procedure Pain Scale Numeric 0/10 Debridement Procedure Performed by Dr. Angelic Kellogg Post-Procedure Length (cm) 1.5 cm Post-Procedure Width (cm) 1.8 cm Post-Procedure Depth (cm) 0.2 cm Post-Procedure Volume (cm^3) 0.54 cm^3 Post-Procedure Surface Area (cm^2) 2.7 cm^2 Post Procedure Pain Scale Numeric 0/10 Procedure Tolerated Well Wound Breast Left Date First Assessed/Time First Assessed: 06/07/19 0834   Location: Breast  Wound Location Orientation: Left Dressing Status Removed Dressing Type Vacuum dressing Wound Length (cm) 1.3 cm Wound Width (cm) 2.8 cm Wound Depth (cm) 0.1 cm Wound Volume (cm^3) 0.36 cm^3 Condition of Base Pink Condition of Edges Open Drainage Amount Moderate Drainage Color Serosanguinous Wound Odor None Sandra-wound Assessment Intact Cleansing and Cleansing Agents  Normal saline

## 2019-09-24 ENCOUNTER — HOSPITAL ENCOUNTER (OUTPATIENT)
Dept: WOUND CARE | Age: 59
Discharge: HOME | End: 2019-09-24
Payer: COMMERCIAL

## 2019-09-24 VITALS
RESPIRATION RATE: 16 BRPM | SYSTOLIC BLOOD PRESSURE: 139 MMHG | HEART RATE: 65 BPM | TEMPERATURE: 101.9 F | DIASTOLIC BLOOD PRESSURE: 64 MMHG

## 2019-09-24 DIAGNOSIS — I48.0 PAF (PAROXYSMAL ATRIAL FIBRILLATION) (HCC): ICD-10-CM

## 2019-09-24 PROBLEM — M21.962 FOOT DEFORMITY, ACQUIRED, LEFT: Status: ACTIVE | Noted: 2019-09-24

## 2019-09-24 PROBLEM — Q66.70 PES CAVUS: Status: ACTIVE | Noted: 2019-09-24

## 2019-09-24 PROBLEM — M21.961 FOOT DEFORMITY, RIGHT: Status: ACTIVE | Noted: 2019-09-24

## 2019-09-24 PROCEDURE — 11042 DBRDMT SUBQ TIS 1ST 20SQCM/<: CPT

## 2019-09-24 PROCEDURE — 74011000250 HC RX REV CODE- 250: Performed by: PODIATRIST

## 2019-09-24 RX ADMIN — Medication: at 13:43

## 2019-09-24 NOTE — WOUND CARE
09/24/19 1412 Wound Foot Right;Plantar;Lateral 07/30/19 Date First Assessed/Time First Assessed: 07/30/19 1034   Present on Hospital Admission: Yes  Wound Approximate Age at First Assessment (Weeks): 1 weeks  Primary Wound Type: Diabetic Ulcer  Location: Foot  Wound Location Orientation: Right;Plantar;Late. .. Dressing Type Applied Collagens/cell matrix;Silver products; Alginate;Gauze;Gauze wrap (arnie); Special tape (comment) 
(endoformVideofropperel Ag) Wound Procedure Type Debridement- Surgical  
Procedure Time Out 3050 Consent Obtained  Yes Procedure Bleeding Minimal  
Procedure Hemostasis  Silver Nitrate Type of Tissue Removed  Devitalized Procedure Instrument  Blade Procedure Pain Scale Numeric 0/10 Debridement Procedure Performed by Dr Brennon Estevez Post-Procedure Length (cm) 1 cm Post-Procedure Width (cm) 1 cm Post-Procedure Depth (cm) 0.2 cm Post-Procedure Volume (cm^3) 0.2 cm^3 Post-Procedure Surface Area (cm^2) 1 cm^2 Procedure Tolerated Well Wound Foot Left;Plantar Date First Assessed/Time First Assessed: 06/21/19 0857   Present on Hospital Admission: Yes  Primary Wound Type: Diabetic  Location: Foot  Wound Location Orientation: Left;Plantar Dressing Type Applied Collagens/cell matrix;Silver products; Alginate;Gauze;Gauze wrap (arnie); Special tape (comment) 
(endoform,Aquacel Ag) Wound Procedure Type Debridement- Surgical  
Procedure Time Out 0250 Consent Obtained  Yes Procedure Bleeding Moderate Procedure Hemostasis  Silver Nitrate Type of Tissue Removed  Devitalized Procedure Instrument  Blade Procedure Pain Scale Numeric 0/10 Debridement Procedure Performed by DR Brennon Estevez Post-Procedure Length (cm) 1.5 cm Post-Procedure Width (cm) 2 cm Post-Procedure Depth (cm) 0.2 cm Post-Procedure Volume (cm^3) 0.6 cm^3 Post-Procedure Surface Area (cm^2) 3 cm^2 Procedure Tolerated Well

## 2019-09-24 NOTE — WOUND CARE
09/24/19 1320 Wound Foot Right;Plantar;Lateral 07/30/19 Date First Assessed/Time First Assessed: 07/30/19 1034   Present on Hospital Admission: Yes  Wound Approximate Age at First Assessment (Weeks): 1 weeks  Primary Wound Type: Diabetic Ulcer  Location: Foot  Wound Location Orientation: Right;Plantar;Late. .. Dressing Status Removed Dressing Type Gauze;Special tape (comment) Wound Length (cm) 0.8 cm Wound Width (cm) 0.8 cm Wound Depth (cm) 0.2 cm Wound Volume (cm^3) 0.13 cm^3 Condition of Base Pink Condition of Edges Calloused; Open Drainage Amount Moderate Drainage Color Serosanguinous Wound Odor None Sandra-wound Assessment Intact Cleansing and Cleansing Agents  Normal saline Wound Foot Left;Plantar Date First Assessed/Time First Assessed: 06/21/19 0857   Present on Hospital Admission: Yes  Primary Wound Type: Diabetic  Location: Foot  Wound Location Orientation: Left;Plantar Dressing Status Removed Dressing Type Gauze;Special tape (comment) Wound Length (cm) 1.5 cm Wound Width (cm) 1.7 cm Wound Depth (cm) 0.2 cm Wound Volume (cm^3) 0.51 cm^3 Condition of Base Pink Condition of Edges Calloused; Open Drainage Amount Moderate Drainage Color Serosanguinous Wound Odor None Sandra-wound Assessment Intact Cleansing and Cleansing Agents  Normal saline Wound Breast Left Date First Assessed/Time First Assessed: 06/07/19 0834   Location: Breast  Wound Location Orientation: Left Dressing Status Removed Dressing Type  
(border foam) Condition of Base Pink Condition of Edges Open Drainage Amount Moderate Drainage Color Serous Wound Odor None Sandra-wound Assessment Intact Cleansing and Cleansing Agents  Normal saline Dressing Type Applied Collagens/cell matrix 
(endoform, Border foam) Visit Vitals /64 Pulse 65 Temp (!) 101.9 °F (38.8 °C) Resp 16 LLE Peripheral Vascular Capillary Refill: Less than/equal to 3 seconds (09/24/19 1330) Color: Appropriate for race (09/24/19 1330) Temperature: Warm (09/24/19 1330) Sensation: Present (09/24/19 1330) Pedal Pulse: Palpable (09/24/19 1330) RLE Peripheral Vascular Capillary Refill: Less than/equal to 3 seconds (09/24/19 1330) Color: Appropriate for race (09/24/19 1330) Temperature: Warm (09/24/19 1330) Sensation: Present (09/24/19 1330) Pedal Pulse: Palpable (09/24/19 1330)

## 2019-09-24 NOTE — PROGRESS NOTES
Patient presents to wound clinic for: Rocío Bolton is a 60 y.o. female who presents for wound care of bilateral plantar foot ulcers. Patient currently being treated by Dr. Miquel Woods for a wound of the left breast, and was referred to me for further offloading and wound care recommendations of the bilateral heel ulcers. The ulcer of the plantar left heel occurred first and patient was placed in a heel offloading shoe. She then subsequently developed an ulceration of the plantar 5th met base of the right foot. She has been minimizing her ambulation, and notes that she is currently not having any pain in her feet. She has been wearing her sneakers with her offloading pads. Denies f/c/n/v/d. No new complaints at the time. Was seen by Dr. Miquel Woods last week and additionally did not note any issues at that time. Pertinent Medical History: 
Past Medical History:  
Diagnosis Date  Acquired lymphedema Right arm  Arrhythmia  Asthma  Atrial fibrillation ([facility] [address] Dr. Raquel Luong and Dr. Yenny Conteh [facility])  Cancer ([facility] [address] bilateral breast  
 Chronic kidney disease  Diabetes mellitus type II   
 Dizziness  Essential hypertension  Hyperlipidemia  Hypertension  Long term (current) use of anticoagulants  Morbid obesity ([address] 3/14/2012  Osteoarthritis  Pacemaker  Sick sinus syndrome ([facility] [address] Past Surgical History:  
Procedure Laterality Date  ABDOMEN SURGERY PROC UNLISTED    
 gastric bypass  GASTRIC BYPASS,OBESE<150CM SAW-EN-Y  7/08  
 [facility]  HX AFIB ABLATION    
 HX CARPAL TUNNEL RELEASE [address] [place] [address] RIGHT MODIFIED RADICAL   
 HX MOHS PROCEDURES  1995  
 right  HX ORTHOPAEDIC    
 ight knee surgery  HX PACEMAKER    
 IR INSERT TUNL CVC W PORT OVER 5 YEARS    
 LAMINECTOMY,CERVICAL  1994  
 NEEDLE BIOPSY LIVER,W OTHR [address]  8.21.07  
 [address] AND 1994  FL TRABECULOPLASTY BY LASER SURGERY    
 US GUIDE ASP OVARIAN CYST ABD APPROACH  8.21.07  
 RIGHT Prior to Admission medications Medication Sig Start Date End Date Taking? Authorizing Provider  
sertraline (ZOLOFT) 50 mg tablet TAKE ONE AND ONE-HALF (1 & 1/2) TABLET BY MOUTH DAILY 8/22/19   Kristen Hills MD  
warfarin (COUMADIN) 4 mg tablet Take 1 tablet on Monday a half tablet the other 6 days. 8/15/19   Kristen Hills MD  
bumetanide (BUMEX) 1 mg tablet Take 2 mg by mouth daily. Provider, Historical  
cloNIDine HCl (CATAPRES) 0.3 mg tablet Take 0.6 mg by mouth nightly. Provider, Historical  
metoprolol succinate (TOPROL-XL) 100 mg tablet TAKE TWO TABLETS BY MOUTH DAILY 7/12/19   Kristen Hills MD  
hydrALAZINE (APRESOLINE) 100 mg tablet TAKE ONE TABLET BY MOUTH THREE TIMES A DAY 6/24/19   Mario Pelaez NP  
doxazosin (CARDURA) 4 mg tablet TAKE ONE TABLET BY MOUTH ONCE NIGHTLY 6/14/19   Kristen Hills MD  
busPIRone (BUSPAR) 10 mg tablet TAKE ONE TABLET BY MOUTH TWICE A DAY 5/24/19   Kristen Hills MD  
potassium chloride SR (KLOR-CON 10) 10 mEq tablet TAKE ONE TABLET BY MOUTH DAILY 4/22/19   Yayo Clemente MD  
insulin glargine (LANTUS U-100 INSULIN) 100 unit/mL injection 20 Units by SubCUTAneous route daily. 11/13/18   Kristen Hills MD  
metolazone (ZAROXOLYN) 5 mg tablet Take 1 Tab by mouth daily as needed (take if develop increased LE edema, weight gain > 5 pounds. ). 4/10/14   Naz Victor NP  
cholecalciferol, VITAMIN D3, (VITAMIN D3) 5,000 unit tab tablet Take 5,000 Units by mouth daily. Provider, Historical  
vitamin A 8,000 unit capsule Take 8,000 Units by mouth daily.     Provider, Historical  
insulin lispro (HUMALOG) 100 unit/mL kwikpen 2 units with dinner for sugars over 200 1/3/14   Kristen Hills MD  
cyanocobalamin (VITAMIN B-12) 1,000 mcg sublingual tablet Take 1,000 mcg by mouth daily. Provider, Historical  
 
Allergies Allergen Reactions  Ace Inhibitors Cough  Amlodipine Swelling  Avapro [Irbesartan] Unable to Obtain  Bactrim [Sulfamethoxazole-Trimethoprim] Other (comments) Sore mouth  Captopril Cough  Carvedilol Diarrhea  Contrast Agent [Iodine] Itching [address]  Morphine Nausea and Vomiting and Swelling  Penicillins Hives  Pravachol [Pravastatin] Nausea Only  Seafood [Shellfish Containing Products] Hives  Singulair [Montelukast] Other (comments)  
  palpitations Family History Problem Relation Age of Onset  Cancer Mother  Heart Disease Mother  Alcohol abuse Father  Diabetes Brother  Hypertension Brother Social History Socioeconomic History  Marital status:  Spouse name: Not on file  Number of children: Not on file  Years of education: Not on file  Highest education level: Not on file Occupational History  Not on file Social Needs  Financial resource strain: Not on file  Food insecurity:  
  Worry: Not on file Inability: Not on file  Transportation needs:  
  Medical: Not on file Non-medical: Not on file Tobacco Use  Smoking status: Never Smoker  Smokeless tobacco: Never Used Substance and Sexual Activity  Alcohol use: Yes Comment: rare  Drug use: No  
 Sexual activity: Not on file Lifestyle  Physical activity:  
  Days per week: Not on file Minutes per session: Not on file  Stress: Not on file Relationships  Social connections:  
  Talks on phone: Not on file Gets together: Not on file Attends Yarsanism service: Not on file Active member of club or organization: Not on file Attends meetings of clubs or organizations: Not on file Relationship status: Not on file  Intimate partner violence:  
  Fear of current or ex partner: Not on file Emotionally abused: Not on file Physically abused: Not on file Forced sexual activity: Not on file Other Topics Concern  Not on file Social History Narrative  Not on file Vitals:  
 09/24/19 1329 BP: 139/64 Pulse: 65 Resp: 16 Temp: (!) 101.9 °F (38.8 °C) Review of Systems: 
 
Gen: No fever, chills, malaise, weight loss/gain. Heent: No headache, rhinorrhea, epistaxis, ear pain, hearing loss, sinus pain, neck pain/stiffness, sore throat. Heart: No chest pain, palpitations, CHOW, pnd, or orthopnea. Resp: No cough, hemoptysis, wheezing and shortness of breath. GI: No nausea, vomiting, diarrhea, constipation, melena or hematochezia. : No urinary obstruction, dysuria or hematuria. Derm: see below Musc/skeletal: no bone or joint complains. Vasc: No edema, cyanosis or claudication. Endo: No heat/cold intolerance, no polyuria,polydipsia or polyphagia. Neuro: No unilateral weakness, numbness, tingling. No seizures. Heme: No easy bruising or bleeding. Wound Description: 
Refer to nursing notes for measurements. Ulcer Debridement Left plantar foot wound Character of Ulcer: Stable Indication for Debridement: Slough, Exudate, Abnormal wound edge and Abnormal Wound base Pre debridement measurements: 1.5 cm x 1.7 cm x 0.2 cm Risks of procedure were discussed with patient. Consent has been signed. Anesthetic: Lidocaine 4% topical cream  
 
Level of Debridement: Subctaneous Tissue Tissue and/or Material removed: Exudate, Fibrin/ Slough and Subcutaneous Type of Tissue: Non- Viable Pre-debridement severity: Fat Layer Exposed Post debridement severity: Fat Layer exposed Instrument(s) used: Scalpel Bleeding controlled with: Pressure Estimated blood loss: Minimal 
 
Pre debridement measurements: 1.6 cm x 1.8 cm x 0.2 cm Post procedural pain: mild Patient tolerated procedure well. Right  plantar foot wound Character of Ulcer: Improved Indication for Debridement: Slough, Exudate, Abnormal wound edge and Abnormal Wound base Pre debridement measurements: 0.8 cm x 0.8 cm x 0.2 cm Risks of procedure were discussed with patient. Consent has been signed. Anesthetic: Lidocaine 4% topical cream  
 
Level of Debridement: Subctaneous Tissue Tissue and/or Material removed: Exudate, Fibrin/ Slough and Subcutaneous Type of Tissue: Non- Viable Pre-debridement severity: Fat Layer Exposed Post debridement severity: Fat Layer exposed Instrument(s) used: Scalpel Bleeding controlled with: Pressure Estimated blood loss: Minimal 
 
Postdebridement measurements: 0.9 cm x 0.8 cm x 0.3 cm Post procedural pain: mild Patient tolerated procedure well. Infection: yes to right foot Edema: mild edema Lower Extremity Circulation Reviewed patient's recent ABIs. Shows decrease from when the  
 
Offloading: Offloading padding to shoes Assessment: 1. Diabetic plantar heel ulcer left foot 2. Diabetic ulcer right foot-plantar 5th met base 3. Diabetic peripheral neuropathy 4. B/l cavus foot deformity Plan:  
-Patient seen and evaluated as noted above. 
-Wound debridement performed. 
-Discussed with patient that right foot wound is slightly improved from two weeks ago and that the left foot is stable. I believe that in this situation where we are unable to completely and adequately offload each foot, slow progress is to be expected. -Endoform, gauze dressing to both feet. 
-Continue offloading padding in both shoes to allow equal pressure distribution.  -Patient given  Rx for diabetic shoes. I discussed with the patient that some patients are able to be fitted for custom shoes once there ulcers are as superficial hers are.   In this situation it may be helpful to see if we can obtain them at this stage to get the ulcers offloaded. I suspect that the right foot ulcer will heal before the left. If we are able to get the right foot healed and into a diabetic shoe, this could given us more options to offload the left foot (such as resuming TCC) if the right foot is in the appropriate shoe. 
-Follow-up 1 week.

## 2019-09-24 NOTE — WOUND CARE
09/24/19 1412 Wound Foot Right;Plantar;Lateral 07/30/19 Date First Assessed/Time First Assessed: 07/30/19 1034   Present on Hospital Admission: Yes  Wound Approximate Age at First Assessment (Weeks): 1 weeks  Primary Wound Type: Diabetic Ulcer  Location: Foot  Wound Location Orientation: Right;Plantar;Late. .. Wound Procedure Type Debridement- Surgical  
Procedure Time Out 3581 Consent Obtained  Yes Procedure Bleeding Minimal  
Procedure Hemostasis  Silver Nitrate Type of Tissue Removed  Devitalized Procedure Instrument  Blade Procedure Pain Scale Numeric 0/10 Debridement Procedure Performed by Dr Jayden Mcrae Post-Procedure Length (cm) 1 cm Post-Procedure Width (cm) 1 cm Post-Procedure Depth (cm) 0.2 cm Post-Procedure Volume (cm^3) 0.2 cm^3 Post-Procedure Surface Area (cm^2) 1 cm^2 Procedure Tolerated Well Wound Foot Left;Plantar Date First Assessed/Time First Assessed: 06/21/19 0857   Present on Hospital Admission: Yes  Primary Wound Type: Diabetic  Location: Foot  Wound Location Orientation: Left;Plantar Wound Procedure Type Debridement- Surgical  
Procedure Time Out 2777 Consent Obtained  Yes Procedure Bleeding Moderate Procedure Hemostasis  Silver Nitrate Type of Tissue Removed  Devitalized Procedure Instrument  Blade Procedure Pain Scale Numeric 0/10 Debridement Procedure Performed by DR Jayden Mcrae Post-Procedure Length (cm) 1.5 cm Post-Procedure Width (cm) 2 cm Post-Procedure Depth (cm) 0.2 cm Post-Procedure Volume (cm^3) 0.6 cm^3 Post-Procedure Surface Area (cm^2) 3 cm^2 Procedure Tolerated Well

## 2019-09-26 DIAGNOSIS — I48.0 PAF (PAROXYSMAL ATRIAL FIBRILLATION) (HCC): Primary | ICD-10-CM

## 2019-09-26 LAB
INR PPP: 2.2 (ref 0.8–1.2)
PROTHROMBIN TIME: 21.3 SEC (ref 9.1–12)

## 2019-09-26 NOTE — PROGRESS NOTES
Inform patient INR is therapeutic. Confirm current dose of warfarin 4 mg tablets 1 tablet on Monday a half tablet the other 6 days. . No change in warfarin dose. Repeat in one month. Patient informed in My Chart.

## 2019-09-27 ENCOUNTER — OFFICE VISIT (OUTPATIENT)
Dept: INTERNAL MEDICINE CLINIC | Facility: CLINIC | Age: 59
End: 2019-09-27

## 2019-09-27 VITALS
HEART RATE: 76 BPM | RESPIRATION RATE: 12 BRPM | WEIGHT: 254 LBS | BODY MASS INDEX: 37.62 KG/M2 | DIASTOLIC BLOOD PRESSURE: 80 MMHG | SYSTOLIC BLOOD PRESSURE: 164 MMHG | OXYGEN SATURATION: 96 % | TEMPERATURE: 97.8 F | HEIGHT: 69 IN

## 2019-09-27 DIAGNOSIS — Z23 ENCOUNTER FOR IMMUNIZATION: ICD-10-CM

## 2019-09-27 DIAGNOSIS — L03.115 CELLULITIS OF RIGHT LOWER EXTREMITY: Primary | ICD-10-CM

## 2019-09-27 RX ORDER — CLINDAMYCIN HYDROCHLORIDE 300 MG/1
300 CAPSULE ORAL 3 TIMES DAILY
Qty: 30 CAP | Refills: 0 | Status: SHIPPED | OUTPATIENT
Start: 2019-09-27 | End: 2019-11-13

## 2019-09-27 NOTE — PROGRESS NOTES
Verified two patient identifiers, name and . Pt notified of result, currently taking 4 mg coumadin 1 tablet on Monday a half tablet the other 6 days. Next lab is  lab. . Pt had no further questions at this time.

## 2019-09-27 NOTE — PROGRESS NOTES
Megan Bassem  Identified pt with two pt identifiers(name and ). Chief Complaint   Patient presents with    Mass     right thigh       Reviewed record In preparation for visit and have obtained necessary documentation. Has info on advanced directive but has not filled them out. 1. Have you been to the ER, urgent care clinic or hospitalized since your last visit? [facility] ER 9/3/19    2. Have you seen or consulted any other health care providers outside of the [address] [place] [address] since your last visit? Include any pap smears or colon screening. wound care    Vitals reviewed with provider. Health Maintenance reviewed: After verbal order read back of , patient received Flu Shot in left arm,  NDC  45777-817-20 Lot [id number] Exp 20. Patient tolerated procedure without complaints and received VIS. Health Maintenance Due   Topic    EYE EXAM RETINAL OR DILATED     Influenza Age 5 to Adult         Wt Readings from Last 3 Encounters:   19 260 lb 9.3 oz (118.2 kg)   08/15/19 259 lb 9.6 oz (117.8 kg)   19 260 lb 2.3 oz (118 kg)      Temp Readings from Last 3 Encounters:   19 (!) 101.9 °F (38.8 °C)   19 97.4 °F (36.3 °C)   19 96.7 °F (35.9 °C)      BP Readings from Last 3 Encounters:   19 139/64   19 169/74   19 188/81      Pulse Readings from Last 3 Encounters:   19 65   19 85   19 82      There were no vitals filed for this visit.        Learning Assessment:   :     Learning Assessment 3/25/2014   PRIMARY LEARNER Patient   HIGHEST LEVEL OF EDUCATION - PRIMARY LEARNER  2 YEARS OF COLLEGE   BARRIERS PRIMARY LEARNER NONE   CO-LEARNER CAREGIVER No   PRIMARY LANGUAGE ENGLISH    NEED No   LEARNER PREFERENCE PRIMARY DEMONSTRATION   LEARNING SPECIAL TOPICS Does does a pacemaker any noise   ANSWERED BY patient   RELATIONSHIP SELF        Depression Screening:   :     3 most recent PHQ Screens 3/11/2019   Little interest or pleasure in doing things Not at all   Feeling down, depressed, irritable, or hopeless Not at all   Total Score PHQ 2 0        Fall Risk Assessment:   :     Fall Risk Assessment, last 12 mths 8/15/2019   Able to walk? Yes   Fall in past 12 months? No        Abuse Screening:   :     Abuse Screening Questionnaire 8/15/2019   Do you ever feel afraid of your partner? N   Are you in a relationship with someone who physically or mentally threatens you? N   Is it safe for you to go home?  Y        ADL Screening:   :     ADL Assessment 11/17/2014   Feeding yourself No Help Needed   Getting from bed to chair No Help Needed   Getting dressed No Help Needed   Bathing or showering No Help Needed   Walk across the room (includes cane/walker) No Help Needed   Using the telphone No Help Needed   Taking your medications No Help Needed   Preparing meals No Help Needed   Managing money (expenses/bills) No Help Needed   Moderately strenuous housework (laundry) No Help Needed   Shopping for personal items (toiletries/medicines) No Help Needed   Shopping for groceries No Help Needed   Driving No Help Needed   Climbing a flight of stairs No Help Needed   Getting to places beyond walking distances No Help Needed

## 2019-09-27 NOTE — PROGRESS NOTES
HISTORY OF PRESENT ILLNESS  Minerva Kim is a 60 y.o. female. HPI  She has diabetes mellitus,diabetes with CKD, retinopathy and neuropathy, hypertension, hyperlipidemia, Atrial Fibrillation, SSS with PM, obesity and venous stasis. She is being treated for ulcers on lower extremities and left breast wound. Had low grade fever 101 starting on 9/23. Fever went away after 2 days . Right thigh was swollen and hard last night, and this morning it is red. Pain full only if touches it. Intesity is 5/10. [place]yer Pack is tight.      Patient Active Problem List   Diagnosis Code    History of breast cancer Z85.3    Asthma J45.909    Fe deficiency anemia D50.9    Status post ablation of atrial fibrillation Z98.890, Z86.79    Sick sinus syndrome ([facility]) I49.5    S/P cardiac pacemaker procedure Z95.0    Long term (current) use of anticoagulants Z79.01    Mixed hyperlipidemia E78.2    CKD (chronic kidney disease), stage IV ([facility]) H93.3    Systolic murmur I98.7    Essential hypertension I10    PAF (paroxysmal atrial fibrillation) ([facility]) I48.0    Anemia in stage 4 chronic kidney disease ([facility]) N18.4, D63.1    Controlled type 2 diabetes mellitus with stage 4 chronic kidney disease, with long-term current use of insulin ([facility]) E11.22, N18.4, Z79.4    Morbid obesity with BMI of 40.0-44.9, adult ([facility] E66.01, Z68.41    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, associated with type 2 diabetes mellitus ([facility] Z11.5020    Diabetic polyneuropathy associated with type 2 diabetes mellitus ([facility]) E11.42    Venous stasis ulcer of right lower leg with edema of right lower leg ([facility]) I83.019, I83.891, L97.919, R60.9    Laceration of right leg excluding thigh, subsequent encounter S81.811D    Open breast wound, left, initial encounter S21.002A    Diabetic ulcer of left heel associated with type 2 diabetes mellitus, with fat layer exposed ([facility] I74.133, C52.447    Diabetic ulcer of right midfoot associated with type 2 diabetes mellitus, with fat layer exposed ([facility] E11.621, L97.412    Cellulitis of right lower extremity L03.115    Foot deformity, acquired, left M21.962    Foot deformity, right M21.961    Pes cavus Q66.7     Past Medical History:   Diagnosis Date    Acquired lymphedema     Right arm    Arrhythmia     Asthma     Atrial fibrillation (HCC)     Dr. Fifi Ha and Dr. Jakub Lemus Atrial flutter ([facility]     Cancer [facility])     bilateral breast    Chronic kidney disease     Diabetes mellitus type II     Dizziness     Essential hypertension     Hyperlipidemia     Hypertension     Long term (current) use of anticoagulants     Morbid obesity ([facility] 3/14/2012    Osteoarthritis     Pacemaker     Sick sinus syndrome ([facility]      Past Surgical History:   Procedure Laterality Date    ABDOMEN SURGERY PROC UNLISTED      gastric bypass    [facility]OB[facility]<150CM [facility]    HX AFIB ABLATION      HX CARPAL TUNNEL RELEASE      HX CERVICAL FUSION  1994    HX DILATION AND CURETTAGE  1992    HX MASTECTOMY  1998    RIGHT MODIFIED RADICAL     HX MOHS PROCEDURES  1995    right    HX ORTHOPAEDIC      ight knee surgery    HX PACEMAKER      IR INSERT TUNL CVC W PORT OVER 5 YEARS      [address]    NEEDLE BIOPSY LIVER,W OTHR [address]  8.21.07    AL ANESTH,KNEE AREA SURGERY  1982 AND 1994    AL TRABECULOPLASTY BY LASER SURGERY      US GUIDE ASP OVARIAN CYST ABD APPROACH  8.21.07    RIGHT      Social History     Socioeconomic History    Marital status:      Spouse name: Not on file    Number of children: Not on file    Years of education: Not on file    Highest education level: Not on file   Tobacco Use    Smoking status: Never Smoker    Smokeless tobacco: Never Used   Substance and Sexual Activity    Alcohol use: Yes     Comment: rare    Drug use: No     Family History   Problem Relation Age of Onset    Cancer Mother    [facility] Disease Mother     Alcohol abuse Father     Diabetes Brother     Hypertension Brother      Allergies   Allergen Reactions    Ace Inhibitors Cough    Amlodipine Swelling    Avapro [Irbesartan] Unable to Obtain    Bactrim [Sulfamethoxazole-Trimethoprim] Other (comments)     Sore mouth    Captopril Cough    Carvedilol Diarrhea    Contrast Agent [Iodine] Itching    Fish Containing Products Hives    Morphine Nausea and Vomiting and Swelling    Penicillins Hives    Pravachol [Pravastatin] Nausea Only    Seafood [Shellfish Containing Products] Hives    Singulair [Montelukast] Other (comments)     palpitations     Current Outpatient Medications   Medication Sig Dispense Refill    clindamycin (CLEOCIN) 300 mg capsule Take 1 Cap by mouth three (3) times daily. 30 Cap 0    sertraline (ZOLOFT) 50 mg tablet TAKE ONE AND ONE-HALF (1 & 1/2) TABLET BY MOUTH DAILY 45 Tab 5    warfarin (COUMADIN) 4 mg tablet Take 1 tablet on Monday a half tablet the other 6 days. 60 Tab 5    bumetanide (BUMEX) 1 mg tablet Take 2 mg by mouth daily.  cloNIDine HCl (CATAPRES) 0.3 mg tablet Take 0.6 mg by mouth nightly.  metoprolol succinate (TOPROL-XL) 100 mg tablet TAKE TWO TABLETS BY MOUTH DAILY 60 Tab 9    hydrALAZINE (APRESOLINE) 100 mg tablet TAKE ONE TABLET BY MOUTH THREE TIMES A DAY 90 Tab 1    doxazosin (CARDURA) 4 mg tablet TAKE ONE TABLET BY MOUTH ONCE NIGHTLY 30 Tab 10    busPIRone (BUSPAR) 10 mg tablet TAKE ONE TABLET BY MOUTH TWICE A DAY 60 Tab 10    potassium chloride SR (KLOR-CON 10) 10 mEq tablet TAKE ONE TABLET BY MOUTH DAILY 90 Tab 3    insulin glargine (LANTUS U-100 INSULIN) 100 unit/mL injection 20 Units by SubCUTAneous route daily. 10 mL 5    metolazone (ZAROXOLYN) 5 mg tablet Take 1 Tab by mouth daily as needed (take if develop increased LE edema, weight gain > 5 pounds. ). 30 Tab 1    cholecalciferol, VITAMIN D3, (VITAMIN D3) 5,000 unit tab tablet Take 5,000 Units by mouth daily.       vitamin A 8,000 unit capsule Take 8,000 Units by mouth daily.  insulin lispro (HUMALOG) 100 unit/mL kwikpen 2 units with dinner for sugars over 200 1 Package 0    cyanocobalamin (VITAMIN B-12) 1,000 mcg sublingual tablet Take 1,000 mcg by mouth daily. ROS  Visit Vitals  /80 (BP 1 Location: Left arm, BP Patient Position: Sitting)   Pulse 76   Temp 97.8 °F (36.6 °C) (Oral)   Resp 12   Ht 5' 9\" (1.753 m)   Wt 254 lb (115.2 kg)   SpO2 96%   BMI 37.51 kg/m²     Physical Exam   Constitutional: She is oriented to person, place, and time. She appears well-developed and well-nourished. HENT:   Head: Normocephalic and atraumatic. Eyes: Pupils are equal, round, and reactive to light. Neck: Neck supple. Cardiovascular: Normal rate, regular rhythm and normal heart sounds. Exam reveals no gallop. No murmur heard. Pulmonary/Chest: Effort normal and breath sounds normal. She has no wheezes. She has no rales. Musculoskeletal: She exhibits no edema. Neurological: She is alert and oriented to person, place, and time. Skin: Skin is warm, dry and intact. No rash noted. There is erythema. Nursing note and vitals reviewed. ASSESSMENT and PLAN    ICD-10-CM ICD-9-CM    1. Encounter for immunization Z23 V03.89 INFLUENZA VIRUS VAC QUAD,SPLIT,PRESV FREE SYRINGE IM      TN IMMUNIZ ADMIN,1 SINGLE/COMB VAC/TOXOID   2. Cellulitis of right lower extremity L03.115 682.6 clindamycin (CLEOCIN) 300 mg capsule     Diagnoses and all orders for this visit:    1. Encounter for immunization  -     INFLUENZA VIRUS VAC QUAD,SPLIT,PRESV FREE SYRINGE IM  -     TN IMMUNIZ ADMIN,1 SINGLE/COMB VAC/TOXOID    2. Cellulitis of right lower extremity  Notify us if not improving in 3 days or go to ED is worse. -     clindamycin (CLEOCIN) 300 mg capsule; Take 1 Cap by mouth three (3) times daily. Follow-up and Dispositions    · Return if symptoms worsen or fail to improve.      I have discussed the diagnosis, evaluation and treatment options and the intended plan with the patient. Patient understands and is in agreement. The patient has received an after-visit summary and questions were answered concerning future plans. I have discussed side effects and warnings of any new medications with the patient as well.

## 2019-09-27 NOTE — PATIENT INSTRUCTIONS
Take clindamycin 300 mg 3 times a day for 7-10 days. If this is not imrpoving in 3 days, call us. If worse go to ED. Vaccine Information Statement    Influenza (Flu) Vaccine (Inactivated or Recombinant): What You Need to Know    Many Vaccine Information Statements are available in [place] and other languages. See www.immunize.org/vis  Hojas de información sobre vacunas están disponibles en español y en muchos otros idiomas. Visite www.immunize.org/vis    1. Why get vaccinated? Influenza vaccine can prevent influenza (flu). Flu is a contagious disease that spreads around the [place] every year, usually between October and May. Anyone can get the flu, but it is more dangerous for some people. Infants and young children, people 72years of age and older, pregnant women, and people with certain health conditions or a weakened immune system are at greatest risk of flu complications. Pneumonia, bronchitis, sinus infections and ear infections are examples of flu-related complications. If you have a medical condition, such as heart disease, cancer or diabetes, flu can make it worse. Flu can cause fever and chills, sore throat, muscle aches, fatigue, cough, headache, and runny or stuffy nose. Some people may have vomiting and diarrhea, though this is more common in children than adults. Each year thousands of people in the [facility] die from flu, and many more are hospitalized. Flu vaccine prevents millions of illnesses and flu-related visits to the doctor each year. 2. Influenza vaccines     CDC recommends everyone 10months of age and older get vaccinated every flu season. Children 6 months through 6years of age may need 2 doses during a single flu season. Everyone else needs only 1 dose each flu season. It takes about 2 weeks for protection to develop after vaccination. There are many flu viruses, and they are always changing.  Each year a new flu vaccine is made to protect against three or four viruses that are likely to cause disease in the upcoming flu season. Even when the vaccine doesnt exactly match these viruses, it may still provide some protection. Influenza vaccine does not cause flu. Influenza vaccine may be given at the same time as other vaccines. 3. Talk with your health care provider    Tell your vaccine provider if the person getting the vaccine:   Has had an allergic reaction after a previous dose of influenza vaccine, or has any severe, life-threatening allergies.  Has ever had Guillain-Barré Syndrome (also called GBS). In some cases, your health care provider may decide to postpone influenza vaccination to a future visit. People with minor illnesses, such as a cold, may be vaccinated. People who are moderately or severely ill should usually wait until they recover before getting influenza vaccine. Your health care provider can give you more information. 4. Risks of a reaction     Soreness, redness, and swelling where shot is given, fever, muscle aches, and headache can happen after influenza vaccine.  There may be a very small increased risk of Guillain-Barré Syndrome (GBS) after inactivated influenza vaccine (the flu shot). [place] children who get the flu shot along with pneumococcal vaccine (PCV13), and/or DTaP vaccine at the same time might be slightly more likely to have a seizure caused by fever. Tell your health care provider if a child who is getting flu vaccine has ever had a seizure. People sometimes faint after medical procedures, including vaccination. Tell your provider if you feel dizzy or have vision changes or ringing in the ears. As with any medicine, there is a very remote chance of a vaccine causing a severe allergic reaction, other serious injury, or death. 5. What if there is a serious problem? An allergic reaction could occur after the vaccinated person leaves the clinic.  If you see signs of a severe allergic reaction (hives, swelling of the face and throat, difficulty breathing, a fast heartbeat, dizziness, or weakness), call 9-1-1 and get the person to the nearest hospital.    For other signs that concern you, call your health care provider. Adverse reactions should be reported to the Vaccine Adverse Event Reporting System (VAERS). Your health care provider will usually file this report, or you can do it yourself. Visit the VAERS website at www.vaers. [facility].gov or call 2-106.568.1965. VAERS is only for reporting reactions, and VAERS staff do not give medical advice. 6. The National Vaccine Injury Compensation Program    The [facility] Vaccine Injury Compensation Program (VICP) is a federal program that was created to compensate people who may have been injured by certain vaccines. Visit the VICP website at www.[facility].gov/vaccinecompensation or call 5-247.467.5849 to learn about the program and about filing a claim. There is a time limit to file a claim for compensation. 7. How can I learn more?  Ask your health care provider.  Call your local or state health department.  Contact the Centers for Disease Control and Prevention (CDC):  - Call 2-767.732.9328 (8-857-DRY-INFO) or  - Visit CDCs influenza website at www.cdc.gov/flu    Vaccine Information Statement (Interim)  Inactivated Influenza Vaccine   8/15/2019  42 TONYA Lam 787OH-19   Department of Health and Human Services  Centers for Disease Control and Prevention    Office Use Only         Consistent Vitamin K Diet: Care Instructions  Your Care Instructions    Your body needs vitamin K to clot blood and keep your bones strong. It's found in leafy green vegetables such as kale and spinach. If you take the blood thinner warfarin (Coumadin), you need to be careful about how much vitamin K you get. Vitamin K can keep your warfarin from working as it should. Most people who take warfarin can eat normally.  The important thing is to get about the same amount of vitamin K each day. Don't suddenly start eating foods with a lot more or a lot less vitamin K. You can choose how much vitamin K you eat. For example, if you already eat a lot of leafy green vegetables, that's fine. Just keep it about the same amount each day. Follow-up care is a key part of your treatment and safety. Be sure to make and go to all appointments, and call your doctor if you are having problems. It's also a good idea to know your test results and keep a list of the medicines you take. How can you care for yourself at home? You don't need to stop eating food high in vitamin K. But you do need to know what foods contain vitamin K. Then you can try to eat about the same amount of vitamin K each day. · You might limit foods that are high in vitamin K to about 1 serving a day. These foods have about 250 to 500 micrograms (mcg) of vitamin K in each serving. They include:  ? Cooked leafy green vegetables. Examples are kale, spinach, turnip greens, jae greens, Swiss chard, and mustard greens. One serving is ½ cup. · You might limit foods that are medium-high in vitamin K to about 3 servings a day. These foods have about 50 to 150 mcg of vitamin K in each serving. These include:  ? Cooked brussels sprouts, broccoli, cabbage, and asparagus. One serving is ½ cup.  ? Raw leafy green vegetables. Examples are spinach, green leaf lettuce, chiqui lettuce, and endive. One serving is 1 cup. · Vitamin K also is found in many multivitamins. You don't need to stop taking your multivitamin if it has vitamin K. But you do need to take it every day. · Check with your doctor before you start or stop taking any supplements or herbal products. Some of these may contain vitamin K. Where can you learn more? Go to http://dona-windy.info/. Enter A958 in the search box to learn more about \"Consistent Vitamin K Diet: Care Instructions. \"  Current as of: April 9, 2019  Content Version: 12.2  © 1917-2868 [employer]. Care instructions adapted under license by [employer] (which disclaims liability or warranty for this information). If you have questions about a medical condition or this instruction, always ask your healthcare professional. [address] any warranty or liability for your use of this information.

## 2019-10-01 ENCOUNTER — HOSPITAL ENCOUNTER (OUTPATIENT)
Dept: WOUND CARE | Age: 59
Discharge: HOME | End: 2019-10-01
Payer: COMMERCIAL

## 2019-10-01 VITALS
DIASTOLIC BLOOD PRESSURE: 65 MMHG | RESPIRATION RATE: 16 BRPM | TEMPERATURE: 98 F | SYSTOLIC BLOOD PRESSURE: 141 MMHG | HEART RATE: 91 BPM

## 2019-10-01 DIAGNOSIS — L03.115 CELLULITIS OF RIGHT LOWER EXTREMITY: ICD-10-CM

## 2019-10-01 PROCEDURE — 11042 DBRDMT SUBQ TIS 1ST 20SQCM/<: CPT

## 2019-10-01 PROCEDURE — 74011000250 HC RX REV CODE- 250: Performed by: PODIATRIST

## 2019-10-01 RX ADMIN — Medication: at 14:09

## 2019-10-01 NOTE — WOUND CARE
10/01/19 1452 Wound Foot Right;Plantar;Lateral 07/30/19 Date First Assessed/Time First Assessed: 07/30/19 1034   Present on Hospital Admission: Yes  Wound Approximate Age at First Assessment (Weeks): 1 weeks  Primary Wound Type: Diabetic Ulcer  Location: Foot  Wound Location Orientation: Right;Plantar;Late. .. Dressing Type Applied  
(endoform, gauze RG tape) Wound Procedure Type Debridement- Surgical  
Procedure Time Out 1430 Consent Obtained  Yes Procedure Bleeding Moderate Procedure Hemostasis  Silver Nitrate Type of Tissue Removed  Devitalized Procedure Instrument  Blade Procedure Pain Scale Numeric 0/10 Debridement Procedure Performed by DR Shweta Willis Procedure Tolerated Well Wound Foot Left;Plantar Date First Assessed/Time First Assessed: 06/21/19 0857   Present on Hospital Admission: Yes  Primary Wound Type: Diabetic  Location: Foot  Wound Location Orientation: Left;Plantar Dressing Type Applied  
(endoform, gauze RG tape) Wound Procedure Type Debridement- Surgical  
Procedure Time Out 1430 Consent Obtained  Yes Procedure Bleeding Moderate Procedure Hemostasis  Silver Nitrate Type of Tissue Removed  Devitalized Procedure Instrument  Blade Procedure Pain Scale Numeric 0/10 Debridement Procedure Performed by DR Shweta Willis Post Procedure Pain Scale Numeric 0/10 Procedure Tolerated Well Discharged from wound center, ambulatory, denies pain. Has follow up appointment to see MD in 1 week

## 2019-10-01 NOTE — PROGRESS NOTES
Patient presents to wound clinic for: Paz Jc is a 60 y.o. female who presents for wound care of bilateral plantar foot ulcers. Patient currently being treated by Dr. Renaldo Cuevas for a wound of the left breast, and was referred to me for further offloading and wound care recommendations of the bilateral heel ulcers. The ulcer of the plantar left heel occurred first and patient was placed in a heel offloading shoe. She then subsequently developed an ulceration of the plantar 5th met base of the right foot. She has been minimizing her ambulation, and notes that she is currently not having any pain in her feet. She has been wearing her sneakers with her offloading pads. Denies f/c/n/v/d. No new complaints at the time. Notes that she had not made the appointment for her diabetic shoes yet. Pertinent Medical History: 
Past Medical History:  
Diagnosis Date  Acquired lymphedema Right arm  Arrhythmia  Asthma  Atrial fibrillation ([facility] [address] Dr. Shar Zaidi and Dr. Arora Said [facility])  Cancer ([facility] [address] bilateral breast  
 Chronic kidney disease  Diabetes mellitus type II   
 Dizziness  Essential hypertension  Hyperlipidemia  Hypertension  Long term (current) use of anticoagulants  Morbid obesity ([facility] [address] 3/14/2012  Osteoarthritis  Pacemaker  Sick sinus syndrome ([facility] [address] Past Surgical History:  
Procedure Laterality Date  ABDOMEN SURGERY PROC UNLISTED    
 gastric bypass  GASTRIC BYPASS,OBESE<150CM SAW-EN-Y  7/08  
 [facility]  HX AFIB ABLATION    
 HX CARPAL TUNNEL RELEASE [address] [id number] [address] RIGHT MODIFIED RADICAL   
 HX MOHS PROCEDURES  1995  
 right  HX ORTHOPAEDIC    
 ight knee surgery  HX PACEMAKER    
 IR INSERT TUNL CVC W PORT OVER 5 YEARS    
 LAMINECTOMY,CERVICAL  1994  
 NEEDLE BIOPSY LIVER,W OTHR [address]  8.21.07  
 [address] AND 1994  SC TRABECULOPLASTY BY LASER SURGERY    
 US GUIDE ASP OVARIAN CYST ABD APPROACH  8.21.07  
 RIGHT Prior to Admission medications Medication Sig Start Date End Date Taking? Authorizing Provider  
clindamycin (CLEOCIN) 300 mg capsule Take 1 Cap by mouth three (3) times daily. 9/27/19   Janes Baca MD  
sertraline (ZOLOFT) 50 mg tablet TAKE ONE AND ONE-HALF (1 & 1/2) TABLET BY MOUTH DAILY 8/22/19   Janes Baca MD  
warfarin (COUMADIN) 4 mg tablet Take 1 tablet on Monday a half tablet the other 6 days. 8/15/19   Janes Baca MD  
bumetanide (BUMEX) 1 mg tablet Take 2 mg by mouth daily. Provider, Historical  
cloNIDine HCl (CATAPRES) 0.3 mg tablet Take 0.6 mg by mouth nightly. Provider, Historical  
metoprolol succinate (TOPROL-XL) 100 mg tablet TAKE TWO TABLETS BY MOUTH DAILY 7/12/19   Janes Baca MD  
hydrALAZINE (APRESOLINE) 100 mg tablet TAKE ONE TABLET BY MOUTH THREE TIMES A DAY 6/24/19   Ramy Roa NP  
doxazosin (CARDURA) 4 mg tablet TAKE ONE TABLET BY MOUTH ONCE NIGHTLY 6/14/19   Janes Baca MD  
busPIRone (BUSPAR) 10 mg tablet TAKE ONE TABLET BY MOUTH TWICE A DAY 5/24/19   Janes Baca MD  
potassium chloride SR (KLOR-CON 10) 10 mEq tablet TAKE ONE TABLET BY MOUTH DAILY 4/22/19   Saida Sanabria MD  
insulin glargine (LANTUS U-100 INSULIN) 100 unit/mL injection 20 Units by SubCUTAneous route daily. 11/13/18   Janes Baca MD  
metolazone (ZAROXOLYN) 5 mg tablet Take 1 Tab by mouth daily as needed (take if develop increased LE edema, weight gain > 5 pounds. ). 4/10/14   Donald Rae NP  
cholecalciferol, VITAMIN D3, (VITAMIN D3) 5,000 unit tab tablet Take 5,000 Units by mouth daily. Provider, Historical  
vitamin A 8,000 unit capsule Take 8,000 Units by mouth daily.     Provider, Historical  
insulin lispro (HUMALOG) 100 unit/mL kwikpen 2 units with dinner for sugars over 200 1/3/14   Caroline Terry MD  
cyanocobalamin (VITAMIN B-12) 1,000 mcg sublingual tablet Take 1,000 mcg by mouth daily. Provider, Historical  
 
Allergies Allergen Reactions  Ace Inhibitors Cough  Amlodipine Swelling  Avapro [Irbesartan] Unable to Obtain  Bactrim [Sulfamethoxazole-Trimethoprim] Other (comments) Sore mouth  Captopril Cough  Carvedilol Diarrhea  Contrast Agent [Iodine] Itching [address]  Morphine Nausea and Vomiting and Swelling  Penicillins Hives  Pravachol [Pravastatin] Nausea Only  Seafood [Shellfish Containing Products] Hives  Singulair [Montelukast] Other (comments)  
  palpitations Family History Problem Relation Age of Onset  Cancer Mother  Heart Disease Mother  Alcohol abuse Father  Diabetes Brother  Hypertension Brother Social History Socioeconomic History  Marital status:  Spouse name: Not on file  Number of children: Not on file  Years of education: Not on file  Highest education level: Not on file Occupational History  Not on file Social Needs  Financial resource strain: Not on file  Food insecurity:  
  Worry: Not on file Inability: Not on file  Transportation needs:  
  Medical: Not on file Non-medical: Not on file Tobacco Use  Smoking status: Never Smoker  Smokeless tobacco: Never Used Substance and Sexual Activity  Alcohol use: Yes Comment: rare  Drug use: No  
 Sexual activity: Not on file Lifestyle  Physical activity:  
  Days per week: Not on file Minutes per session: Not on file  Stress: Not on file Relationships  Social connections:  
  Talks on phone: Not on file Gets together: Not on file Attends Rastafari service: Not on file Active member of club or organization: Not on file Attends meetings of clubs or organizations: Not on file Relationship status: Not on file  Intimate partner violence:  
  Fear of current or ex partner: Not on file Emotionally abused: Not on file Physically abused: Not on file Forced sexual activity: Not on file Other Topics Concern  Not on file Social History Narrative  Not on file Vitals:  
 10/01/19 1336 BP: 141/65 Pulse: 91  
Resp: 16 Temp: 98 °F (36.7 °C) Review of Systems: 
 
Gen: No fever, chills, malaise, weight loss/gain. Heent: No headache, rhinorrhea, epistaxis, ear pain, hearing loss, sinus pain, neck pain/stiffness, sore throat. Heart: No chest pain, palpitations, CHOW, pnd, or orthopnea. Resp: No cough, hemoptysis, wheezing and shortness of breath. GI: No nausea, vomiting, diarrhea, constipation, melena or hematochezia. : No urinary obstruction, dysuria or hematuria. Derm: see below Musc/skeletal: no bone or joint complains. Vasc: No edema, cyanosis or claudication. Endo: No heat/cold intolerance, no polyuria,polydipsia or polyphagia. Neuro: No unilateral weakness, numbness, tingling. No seizures. Heme: No easy bruising or bleeding. Wound Description: 
Refer to nursing notes for measurements. Ulcer Debridement Left plantar foot wound Character of Ulcer: Stable Indication for Debridement: Slough, Exudate, Abnormal wound edge and Abnormal Wound base Pre debridement measurements: 1.5 cm x 1.8 cm x 0.2 cm Risks of procedure were discussed with patient. Consent has been signed. Anesthetic: Lidocaine 4% topical cream  
 
Level of Debridement: Subctaneous Tissue Tissue and/or Material removed: Exudate, Fibrin/ Slough and Subcutaneous Type of Tissue: Non- Viable Pre-debridement severity: Fat Layer Exposed Post debridement severity: Fat Layer exposed Instrument(s) used: Scalpel Bleeding controlled with: Pressure Estimated blood loss: Minimal 
 
Pre debridement measurements: 1.6 cm x 1.9 cm x 0.3 cm 
 
 Post procedural pain: mild Patient tolerated procedure well. Right  plantar foot wound Character of Ulcer: Stable Indication for Debridement: Slough, Exudate, Abnormal wound edge and Abnormal Wound base Pre debridement measurements: 1 cm x 1 cm x 0.1 cm Risks of procedure were discussed with patient. Consent has been signed. Anesthetic: Lidocaine 4% topical cream  
 
Level of Debridement: Subctaneous Tissue Tissue and/or Material removed: Exudate, Fibrin/ Slough and Subcutaneous Type of Tissue: Non- Viable Pre-debridement severity: Fat Layer Exposed Post debridement severity: Fat Layer exposed Instrument(s) used: Scalpel Bleeding controlled with: Pressure Estimated blood loss: Minimal 
 
Postdebridement measurements: 1.1 cm x 1.1 cm x 0.2 cm Post procedural pain: mild Patient tolerated procedure well. Infection: yes to right foot Edema: mild edema Lower Extremity Circulation Reviewed patient's recent ABIs. Shows decrease from when the  
 
Offloading: Offloading padding to shoes Assessment: 1. Diabetic plantar heel ulcer left foot 2. Diabetic ulcer right foot-plantar 5th met base 3. Diabetic peripheral neuropathy 4. B/l cavus foot deformity Plan:  
-Patient seen and evaluated as noted above. 
-Wound debridement performed. 
-Discussed with patient that right foot wound isstable and that the left foot is stable. I believe that in this situation where we are unable to completely and adequately offload each foot, slow progress is to be expected. However, both wounds appear to be more shallow with the beginning of some dermis formation ntoed 
-Endoform, gauze dressing to both feet. 
-Continue offloading padding in both shoes to allow equal pressure distribution.  -Patient to get diabetic shoes.  I discussed with the patient that some patients are able to be fitted for custom shoes once there ulcers are as superficial hers are. In this situation it may be helpful to see if we can obtain them at this stage to get the ulcers offloaded. I suspect that the right foot ulcer will heal before the left. If we are able to get the right foot healed and into a diabetic shoe, this could given us more options to offload the left foot (such as resuming TCC) if the right foot is in the appropriate shoe. 
-Follow-up 1 week.

## 2019-10-01 NOTE — WOUND CARE
10/01/19 1335 Wound Foot Right;Plantar;Lateral 07/30/19 Date First Assessed/Time First Assessed: 07/30/19 1034   Present on Hospital Admission: Yes  Wound Approximate Age at First Assessment (Weeks): 1 weeks  Primary Wound Type: Diabetic Ulcer  Location: Foot  Wound Location Orientation: Right;Plantar;Late. .. Dressing Status Removed Dressing Type Collagens/cell matrix;Gauze;Gauze wrap (arnie); Special tape (comment) Wound Length (cm) 1 cm Wound Width (cm) 1 cm Wound Depth (cm) 0.1 cm Wound Volume (cm^3) 0.1 cm^3 Condition of Base Pink Condition of Edges Calloused Undermining (cm) 0.4 cm Direction of Undermining 12 o'clock Drainage Amount Moderate Drainage Color Serosanguinous Wound Odor None Sandra-wound Assessment Intact Cleansing and Cleansing Agents  Normal saline Wound Foot Left;Plantar Date First Assessed/Time First Assessed: 06/21/19 0857   Present on Hospital Admission: Yes  Primary Wound Type: Diabetic  Location: Foot  Wound Location Orientation: Left;Plantar Dressing Status Removed Dressing Type Collagens/cell matrix;Gauze;Special tape (comment) Wound Length (cm) 1.5 cm Wound Width (cm) 1.8 cm Wound Depth (cm) 0.2 cm Wound Volume (cm^3) 0.54 cm^3 Condition of Base Pink Condition of Edges Calloused; Open Drainage Amount Moderate Drainage Color Serosanguinous Wound Odor None Sandra-wound Assessment Intact Cleansing and Cleansing Agents  Normal saline Wound Breast Left Date First Assessed/Time First Assessed: 06/07/19 0834   Location: Breast  Wound Location Orientation: Left Dressing Status Removed Dressing Type Collagens/cell matrix (tape) Condition of Base Pink Condition of Edges Open Drainage Amount Small Drainage Color Serosanguinous Wound Odor None Sandra-wound Assessment Intact 
(skin tear  from tape by patient) Cleansing and Cleansing Agents  Normal saline Visit Vitals /65 Pulse 91 Temp 98 °F (36.7 °C) Resp 16 LLE Peripheral Vascular Capillary Refill: Less than/equal to 3 seconds (10/01/19 1337) Color: Appropriate for race (10/01/19 1337) Temperature: Warm (10/01/19 1337) Sensation: Present (10/01/19 1337) Pedal Pulse: Palpable (10/01/19 1337) RLE Peripheral Vascular Capillary Refill: Less than/equal to 3 seconds (10/01/19 1337) Color: Appropriate for race (10/01/19 1337) Temperature: Warm (10/01/19 1337) Sensation: Present (10/01/19 1337) Pedal Pulse: Palpable (10/01/19 1337)

## 2019-10-08 ENCOUNTER — HOSPITAL ENCOUNTER (OUTPATIENT)
Dept: WOUND CARE | Age: 59
Discharge: HOME | End: 2019-10-08
Payer: COMMERCIAL

## 2019-10-08 VITALS
RESPIRATION RATE: 18 BRPM | TEMPERATURE: 97.1 F | SYSTOLIC BLOOD PRESSURE: 116 MMHG | HEART RATE: 83 BPM | DIASTOLIC BLOOD PRESSURE: 55 MMHG

## 2019-10-08 PROCEDURE — 97597 DBRDMT OPN WND 1ST 20 CM/<: CPT

## 2019-10-08 PROCEDURE — 74011000250 HC RX REV CODE- 250: Performed by: PODIATRIST

## 2019-10-08 RX ADMIN — Medication: at 13:38

## 2019-10-08 NOTE — WOUND CARE
10/08/19 1314 Wound Foot Right;Plantar;Lateral 07/30/19 Date First Assessed/Time First Assessed: 07/30/19 1034   Present on Hospital Admission: Yes  Wound Approximate Age at First Assessment (Weeks): 1 weeks  Primary Wound Type: Diabetic Ulcer  Location: Foot  Wound Location Orientation: Right;Plantar;Late. .. Dressing Status Removed Dressing Type Collagens/cell matrix 
(gauze, RG) Wound Foot Left;Plantar Date First Assessed/Time First Assessed: 06/21/19 0857   Present on Hospital Admission: Yes  Primary Wound Type: Diabetic  Location: Foot  Wound Location Orientation: Left;Plantar Dressing Status Removed Dressing Type Collagens/cell matrix 
(gauze RG) Wound Breast Left Date First Assessed/Time First Assessed: 06/07/19 0834   Location: Breast  Wound Location Orientation: Left Wound Length (cm) 1.3 cm Wound Width (cm) 2 cm Wound Depth (cm) 0.1 cm Wound Volume (cm^3) 0.26 cm^3 Condition of Base Pink Condition of Edges Open Drainage Amount Moderate Drainage Color Serosanguinous Wound Odor None Sandra-wound Assessment Intact Cleansing and Cleansing Agents  Normal saline Dressing Type Applied  
(endoform, foam dressing) Procedure Tolerated Well

## 2019-10-08 NOTE — WOUND CARE
10/08/19 1355 Wound Foot Right;Plantar;Lateral 07/30/19 Date First Assessed/Time First Assessed: 07/30/19 1034   Present on Hospital Admission: Yes  Wound Approximate Age at First Assessment (Weeks): 1 weeks  Primary Wound Type: Diabetic Ulcer  Location: Foot  Wound Location Orientation: Right;Plantar;Late. .. Dressing Type Applied  
(Endoform, gauze, Roll gauze) Wound Foot Left;Plantar Date First Assessed/Time First Assessed: 06/21/19 0857   Present on Hospital Admission: Yes  Primary Wound Type: Diabetic  Location: Foot  Wound Location Orientation: Left;Plantar Dressing Type Applied  
(Endoform, gauze, Roll gauze) Wound Breast Left Date First Assessed/Time First Assessed: 06/07/19 0834   Location: Breast  Wound Location Orientation: Left Dressing Type Applied  
(Endoform, mepilex border) Discharge Condition: Stable Pain: 0 Ambulatory Status: Walking Discharge Destination: Home Transportation: Car Accompanied by: Self Discharge instructions reviewed with Patient and copy or written instructions have been provided. All questions/concerns have been addressed at this time.

## 2019-10-08 NOTE — WOUND CARE
10/08/19 1314 Wound Foot Right;Plantar;Lateral 07/30/19 Date First Assessed/Time First Assessed: 07/30/19 1034   Present on Hospital Admission: Yes  Wound Approximate Age at First Assessment (Weeks): 1 weeks  Primary Wound Type: Diabetic Ulcer  Location: Foot  Wound Location Orientation: Right;Plantar;Late. .. Dressing Status Removed Dressing Type Collagens/cell matrix 
(gauze, RG) Wound Foot Left;Plantar Date First Assessed/Time First Assessed: 06/21/19 0857   Present on Hospital Admission: Yes  Primary Wound Type: Diabetic  Location: Foot  Wound Location Orientation: Left;Plantar Dressing Status Removed Dressing Type Collagens/cell matrix 
(gauze RG) Wound Breast Left Date First Assessed/Time First Assessed: 06/07/19 0834   Location: Breast  Wound Location Orientation: Left Wound Length (cm) 1.3 cm Wound Width (cm) 2 cm Wound Depth (cm) 0.1 cm Wound Volume (cm^3) 0.26 cm^3 Condition of Base Pink Condition of Edges Open Drainage Amount Small Drainage Color Serosanguinous Wound Odor None Sandra-wound Assessment Intact Cleansing and Cleansing Agents  Normal saline Dressing Type Applied  
(endoform, foam dressing) Procedure Tolerated Well

## 2019-10-08 NOTE — PROGRESS NOTES
Patient presents to wound clinic for: Kathie De Dios is a 60 y.o. female who presents for wound care of bilateral plantar foot ulcers. Patient currently being treated by Dr. Cholo Lazo for a wound of the left breast, and was referred to me for further offloading and wound care recommendations of the bilateral heel ulcers. The ulcer of the plantar left heel occurred first and patient was placed in a heel offloading shoe. She then subsequently developed an ulceration of the plantar 5th met base of the right foot. She has been minimizing her ambulation, and notes that she is currently not having any pain in her feet. She has been wearing her sneakers with her offloading pads. Denies f/c/n/v/d. No new complaints at the time. Notes that she has an appointment for diabetic shoes at beginning of November. Pertinent Medical History: 
Past Medical History:  
Diagnosis Date  Acquired lymphedema Right arm  Arrhythmia  Asthma  [facility] ([facility]  Cancer ([facility] bilateral breast  
 Chronic kidney disease  Diabetes mellitus type II   
 Dizziness  Essential hypertension  Hyperlipidemia  Hypertension  Long term (current) use of anticoagulants  Morbid obesity ([address] 3/14/2012  Osteoarthritis  Pacemaker  Sick sinus syndrome ([facility] Past Surgical History:  
Procedure Laterality Date  ABDOMEN SURGERY PROC UNLISTED    
 gastric bypass  GASTRIC BYPASS,OBESE<150CM SAW-EN-Y  7/08  
 [facility]  HX AFIB ABLATION    
 HX CARPAL TUNNEL RELEASE [id number] RIGHT MODIFIED RADICAL   
 HX MOHS PROCEDURES  1995  
 right  HX ORTHOPAEDIC    
 ight knee surgery  HX PACEMAKER    
 IR INSERT TUNL CVC W PORT OVER 5 YEARS    
 LAMINECTOMY,CERVICAL  1994  
 NEEDLE BIOPSY LIVER,W OTHR [address]  8.21.07  
 [address] AND 1994  [place] TRABECULOPLASTY BY LASER SURGERY    
 US GUIDE ASP OVARIAN CYST ABD APPROACH  8.21.07  
 RIGHT Prior to Admission medications Medication Sig Start Date End Date Taking? Authorizing Provider  
clindamycin (CLEOCIN) 300 mg capsule Take 1 Cap by mouth three (3) times daily. 9/27/19   Diego Evans MD  
sertraline (ZOLOFT) 50 mg tablet TAKE ONE AND ONE-HALF (1 & 1/2) TABLET BY MOUTH DAILY 8/22/19   Diego Evans MD  
warfarin (COUMADIN) 4 mg tablet Take 1 tablet on Monday a half tablet the other 6 days. 8/15/19   Diego Evans MD  
bumetanide (BUMEX) 1 mg tablet Take 2 mg by mouth daily. Provider, Historical  
cloNIDine HCl (CATAPRES) 0.3 mg tablet Take 0.6 mg by mouth nightly. Provider, Historical  
metoprolol succinate (TOPROL-XL) 100 mg tablet TAKE TWO TABLETS BY MOUTH DAILY 7/12/19   Diego Evans MD  
hydrALAZINE (APRESOLINE) 100 mg tablet TAKE ONE TABLET BY MOUTH THREE TIMES A DAY 6/24/19   Jesus Gonzales NP  
doxazosin (CARDURA) 4 mg tablet TAKE ONE TABLET BY MOUTH ONCE NIGHTLY 6/14/19   Diego Evans MD  
busPIRone (BUSPAR) 10 mg tablet TAKE ONE TABLET BY MOUTH TWICE A DAY 5/24/19   Diego Evans MD  
potassium chloride SR (KLOR-CON 10) 10 mEq tablet TAKE ONE TABLET BY MOUTH DAILY 4/22/19   Harvinder Duarte MD  
insulin glargine (LANTUS U-100 INSULIN) 100 unit/mL injection 20 Units by SubCUTAneous route daily. 11/13/18   Diego Evans MD  
metolazone (ZAROXOLYN) 5 mg tablet Take 1 Tab by mouth daily as needed (take if develop increased LE edema, weight gain > 5 pounds. ). 4/10/14   Sofia Ramirez NP  
cholecalciferol, VITAMIN D3, (VITAMIN D3) 5,000 unit tab tablet Take 5,000 Units by mouth daily. Provider, Historical  
vitamin A 8,000 unit capsule Take 8,000 Units by mouth daily.     Provider, Historical  
insulin lispro (HUMALOG) 100 unit/mL kwikpen 2 units with dinner for sugars over 200 1/3/14   Urvashi Abrams MD  
cyanocobalamin (VITAMIN B-12) 1,000 mcg sublingual tablet Take 1,000 mcg by mouth daily. Provider, Historical  
 
Allergies Allergen Reactions  Ace Inhibitors Cough  Amlodipine Swelling  Avapro [Irbesartan] Unable to Obtain  Bactrim [Sulfamethoxazole-Trimethoprim] Other (comments) Sore mouth  Captopril Cough  Carvedilol Diarrhea  Contrast Agent [Iodine] Itching [address]  Morphine Nausea and Vomiting and Swelling  Penicillins Hives  Pravachol [Pravastatin] Nausea Only  Seafood [Shellfish Containing Products] Hives  Singulair [Montelukast] Other (comments)  
  palpitations Family History Problem Relation Age of Onset  Cancer Mother  Heart Disease Mother  Alcohol abuse Father  Diabetes Brother  Hypertension Brother Social History Socioeconomic History  Marital status:  Spouse name: Not on file  Number of children: Not on file  Years of education: Not on file  Highest education level: Not on file Occupational History  Not on file Social Needs  Financial resource strain: Not on file  Food insecurity:  
  Worry: Not on file Inability: Not on file  Transportation needs:  
  Medical: Not on file Non-medical: Not on file Tobacco Use  Smoking status: Never Smoker  Smokeless tobacco: Never Used Substance and Sexual Activity  Alcohol use: Yes Comment: rare  Drug use: No  
 Sexual activity: Not on file Lifestyle  Physical activity:  
  Days per week: Not on file Minutes per session: Not on file  Stress: Not on file Relationships  Social connections:  
  Talks on phone: Not on file Gets together: Not on file Attends [employer] service: Not on file Active member of club or organization: Not on file Attends meetings of clubs or organizations: Not on file Relationship status: Not on file  Intimate partner violence:  
  Fear of current or ex partner: Not on file Emotionally abused: Not on file Physically abused: Not on file Forced sexual activity: Not on file Other Topics Concern  Not on file Social History Narrative  Not on file Vitals:  
 10/08/19 1309 BP: 116/55 Pulse: 83 Resp: 18 Temp: 97.1 °F (36.2 °C) Review of Systems: 
 
Gen: No fever, chills, malaise, weight loss/gain. Heent: No headache, rhinorrhea, epistaxis, ear pain, hearing loss, sinus pain, neck pain/stiffness, sore throat. Heart: No chest pain, palpitations, CHOW, pnd, or orthopnea. Resp: No cough, hemoptysis, wheezing and shortness of breath. GI: No nausea, vomiting, diarrhea, constipation, melena or hematochezia. : No urinary obstruction, dysuria or hematuria. Derm: see below Musc/skeletal: no bone or joint complains. Vasc: No edema, cyanosis or claudication. Endo: No heat/cold intolerance, no polyuria,polydipsia or polyphagia. Neuro: No unilateral weakness, numbness, tingling. No seizures. Heme: No easy bruising or bleeding. Wound Description: 
Refer to nursing notes for measurements. Ulcer Debridement Left plantar foot wound Character of Ulcer: Stable Indication for Debridement: Slough, Exudate, Abnormal wound edge and Abnormal Wound base Pre debridement measurements: 1.7 cm x 2.0 cm x 0.4 cm Risks of procedure were discussed with patient. Consent has been signed. Anesthetic: Lidocaine 4% topical cream  
 
Level of Debridement: Subctaneous Tissue Tissue and/or Material removed: Exudate, Fibrin/ Slough and Subcutaneous Type of Tissue: Non- Viable Pre-debridement severity: Fat Layer Exposed Post debridement severity: Fat Layer exposed Instrument(s) used: Scalpel Bleeding controlled with: Pressure Estimated blood loss: Minimal 
 
Pre debridement measurements: 1.8 cm x 2.1 cm x 0.5 cm Post procedural pain: mild Patient tolerated procedure well. Right  plantar foot wound Character of Ulcer: Stable Indication for Debridement: Slough, Exudate, Abnormal wound edge and Abnormal Wound base Pre debridement measurements: 1.1 cm x 1.5 cm x 0.3 cm Risks of procedure were discussed with patient. Consent has been signed. Anesthetic: Lidocaine 4% topical cream  
 
Level of Debridement: Subctaneous Tissue Tissue and/or Material removed: Exudate, Fibrin/ Slough and Subcutaneous Type of Tissue: Non- Viable Pre-debridement severity: Fat Layer Exposed Post debridement severity: Fat Layer exposed Instrument(s) used: Scalpel Bleeding controlled with: Pressure Estimated blood loss: Minimal 
 
Postdebridement measurements: 1.2 cm x 1.6 cm x 0.4 cm Post procedural pain: mild Patient tolerated procedure well. Infection: yes to right foot Edema: mild edema Lower Extremity Circulation Reviewed patient's recent ABIs. Shows decrease from when the  
 
Offloading: Offloading padding to shoes Assessment: 1. Diabetic plantar heel ulcer left foot 2. Diabetic ulcer right foot-plantar 5th met base 3. Diabetic peripheral neuropathy 4. B/l cavus foot deformity Plan:  
-Patient seen and evaluated as noted above. 
-Wound debridement performed. 
-Discussed with patient that right foot wound isstable and that the left foot is stable. I believe that in this situation where we are unable to completely and adequately offload each foot, slow progress is to be expected. However, both wounds appear to be more clean and there is some skin islands noted to the left heel. 
-Endoform, gauze dressing to both feet. 
-Continue offloading padding in both shoes to allow equal pressure distribution.  -Patient to get diabetic shoes.  I discussed with the patient that some patients are able to be fitted for custom shoes once there ulcers are as superficial hers are. In this situation it may be helpful to see if we can obtain them at this stage to get the ulcers offloaded. I suspect that the right foot ulcer will heal before the left. If we are able to get the right foot healed and into a diabetic shoe, this could given us more options to offload the left foot (such as resuming TCC) if the right foot is in the appropriate shoe. 
-Follow-up 1 week.

## 2019-10-08 NOTE — WOUND CARE
10/08/19 1326 Wound Foot Right;Plantar;Lateral 07/30/19 Date First Assessed/Time First Assessed: 07/30/19 1034   Present on Hospital Admission: Yes  Wound Approximate Age at First Assessment (Weeks): 1 weeks  Primary Wound Type: Diabetic Ulcer  Location: Foot  Wound Location Orientation: Right;Plantar;Late. .. Dressing Status Removed Dressing Type Gauze;Gauze wrap (arnie) Wound Length (cm) 1.1 cm Wound Width (cm) 1.5 cm Wound Depth (cm) 0.3 cm Wound Volume (cm^3) 0.5 cm^3 Condition of Base Pink Condition of Edges Calloused Drainage Amount Moderate Drainage Color Serosanguinous Wound Odor None Sandra-wound Assessment Intact Cleansing and Cleansing Agents  Normal saline Wound Foot Left;Plantar Date First Assessed/Time First Assessed: 06/21/19 0857   Present on Hospital Admission: Yes  Primary Wound Type: Diabetic  Location: Foot  Wound Location Orientation: Left;Plantar Dressing Status Removed Dressing Type Collagens/cell matrix;Gauze;Gauze wrap (arnie) Wound Length (cm) 1.7 cm Wound Width (cm) 2 cm Wound Depth (cm) 0.4 cm Wound Volume (cm^3) 1.36 cm^3 Condition of Base Pink;Slough Condition of Edges Calloused Drainage Amount Moderate Drainage Color Serosanguinous Wound Odor None Sandra-wound Assessment Intact Cleansing and Cleansing Agents  Normal saline

## 2019-10-15 ENCOUNTER — HOSPITAL ENCOUNTER (OUTPATIENT)
Dept: WOUND CARE | Age: 59
Discharge: HOME | End: 2019-10-15
Payer: COMMERCIAL

## 2019-10-15 VITALS
SYSTOLIC BLOOD PRESSURE: 160 MMHG | TEMPERATURE: 97 F | DIASTOLIC BLOOD PRESSURE: 72 MMHG | HEART RATE: 69 BPM | RESPIRATION RATE: 16 BRPM

## 2019-10-15 PROCEDURE — 74011000250 HC RX REV CODE- 250: Performed by: PODIATRIST

## 2019-10-15 PROCEDURE — 11042 DBRDMT SUBQ TIS 1ST 20SQCM/<: CPT

## 2019-10-15 RX ADMIN — Medication: at 13:55

## 2019-10-15 NOTE — PROGRESS NOTES
Patient presents to wound clinic for: Ana Luisa Fraire is a 60 y.o. female who presents for wound care of bilateral plantar foot ulcers. Patient currently being treated by Dr. Lynsey Jarquin for a wound of the left breast, and was referred to me for further offloading and wound care recommendations of the bilateral heel ulcers. The ulcer of the plantar left heel occurred first and patient was placed in a heel offloading shoe. She then subsequently developed an ulceration of the plantar 5th met base of the right foot. She has been minimizing her ambulation, and notes that she is currently not having any pain in her feet. She has been wearing her sneakers with her offloading pads. Denies f/c/n/v/d. No new complaints at the time. Notes that she has an appointment for diabetic shoes at beginning of November. Denies any issues since her last appointment. Pertinent Medical History: 
Past Medical History:  
Diagnosis Date  Acquired lymphedema Right arm  Arrhythmia  Asthma  Atrial fibrillation ([facility] Dr. Marjorie Kirby and Dr. Torrey Zheng [facility])  Cancer ([facility] [address] [facility] bilateral breast  
 Chronic kidney disease  Diabetes mellitus type II   
 Dizziness  Essential hypertension  Hyperlipidemia  Hypertension  Long term (current) use of anticoagulants  Morbid obesity ([facility] [address] 3/14/2012  Osteoarthritis  Pacemaker  Sick sinus syndrome ([facility] [address] Past Surgical History:  
Procedure Laterality Date  ABDOMEN SURGERY PROC UNLISTED    
 gastric bypass  GASTRIC BYPASS,OBESE<150CM SAW-EN-Y  7/08  
 [facility]  HX AFIB ABLATION    
 HX CARPAL TUNNEL RELEASE [address] RIGHT MODIFIED RADICAL   
 HX MOHS PROCEDURES  1995  
 right  HX ORTHOPAEDIC    
 ight knee surgery  HX PACEMAKER    
 IR INSERT TUNL CVC W PORT OVER 5 YEARS    
 Rosie Wolfe  NEEDLE BIOPSY LIVER,W OTHR PROC  8.21.07  
[address] AND 1994  [place] TRABECULOPLASTY BY LASER SURGERY    
 US GUIDE ASP OVARIAN CYST ABD APPROACH  8.21.07  
 RIGHT Prior to Admission medications Medication Sig Start Date End Date Taking? Authorizing Provider  
clindamycin (CLEOCIN) 300 mg capsule Take 1 Cap by mouth three (3) times daily. 9/27/19   Caroline Terry MD  
sertraline (ZOLOFT) 50 mg tablet TAKE ONE AND ONE-HALF (1 & 1/2) TABLET BY MOUTH DAILY 8/22/19   Caroline Terry MD  
warfarin (COUMADIN) 4 mg tablet Take 1 tablet on Monday a half tablet the other 6 days. 8/15/19   Caroline Terry MD  
bumetanide (BUMEX) 1 mg tablet Take 2 mg by mouth daily. Provider, Historical  
cloNIDine HCl (CATAPRES) 0.3 mg tablet Take 0.6 mg by mouth nightly. Provider, Historical  
metoprolol succinate (TOPROL-XL) 100 mg tablet TAKE TWO TABLETS BY MOUTH DAILY 7/12/19   Caroline Terry MD  
hydrALAZINE (APRESOLINE) 100 mg tablet TAKE ONE TABLET BY MOUTH THREE TIMES A DAY 6/24/19   Niko Pierce NP  
doxazosin (CARDURA) 4 mg tablet TAKE ONE TABLET BY MOUTH ONCE NIGHTLY 6/14/19   Caroline Terry MD  
busPIRone (BUSPAR) 10 mg tablet TAKE ONE TABLET BY MOUTH TWICE A DAY 5/24/19   Caroline Terry MD  
potassium chloride SR (KLOR-CON 10) 10 mEq tablet TAKE ONE TABLET BY MOUTH DAILY 4/22/19   Alexsandra Olivia MD  
insulin glargine (LANTUS U-100 INSULIN) 100 unit/mL injection 20 Units by SubCUTAneous route daily. 11/13/18   Caroline Terry MD  
metolazone (ZAROXOLYN) 5 mg tablet Take 1 Tab by mouth daily as needed (take if develop increased LE edema, weight gain > 5 pounds. ). 4/10/14   Xiomy Dawn NP  
cholecalciferol, VITAMIN D3, (VITAMIN D3) 5,000 unit tab tablet Take 5,000 Units by mouth daily. Provider, Historical  
vitamin A 8,000 unit capsule Take 8,000 Units by mouth daily.     Provider, Historical  
 insulin lispro (HUMALOG) 100 unit/mL kwikpen 2 units with dinner for sugars over 200 1/3/14   Janie Holley MD  
cyanocobalamin (VITAMIN B-12) 1,000 mcg sublingual tablet Take 1,000 mcg by mouth daily. Provider, Historical  
 
Allergies Allergen Reactions  Ace Inhibitors Cough  Amlodipine Swelling  Avapro [Irbesartan] Unable to Obtain  Bactrim [Sulfamethoxazole-Trimethoprim] Other (comments) Sore mouth  Captopril Cough  Carvedilol Diarrhea  Contrast Agent [Iodine] Itching [address]  Morphine Nausea and Vomiting and Swelling  Penicillins Hives  Pravachol [Pravastatin] Nausea Only  Seafood [Shellfish Containing Products] Hives  Singulair [Montelukast] Other (comments)  
  palpitations Family History Problem Relation Age of Onset  Cancer Mother  Heart Disease Mother  Alcohol abuse Father  Diabetes Brother  Hypertension Brother Social History Socioeconomic History  Marital status:  Spouse name: Not on file  Number of children: Not on file  Years of education: Not on file  Highest education level: Not on file Occupational History  Not on file Social Needs  Financial resource strain: Not on file  Food insecurity:  
  Worry: Not on file Inability: Not on file  Transportation needs:  
  Medical: Not on file Non-medical: Not on file Tobacco Use  Smoking status: Never Smoker  Smokeless tobacco: Never Used Substance and Sexual Activity  Alcohol use: Yes Comment: rare  Drug use: No  
 Sexual activity: Not on file Lifestyle  Physical activity:  
  Days per week: Not on file Minutes per session: Not on file  Stress: Not on file Relationships  Social connections:  
  Talks on phone: Not on file Gets together: Not on file Attends Congregational service: Not on file Active member of club or organization: Not on file Attends meetings of clubs or organizations: Not on file Relationship status: Not on file  Intimate partner violence:  
  Fear of current or ex partner: Not on file Emotionally abused: Not on file Physically abused: Not on file Forced sexual activity: Not on file Other Topics Concern  Not on file Social History Narrative  Not on file Vitals:  
 10/15/19 1326 BP: 160/72 Pulse: 69 Resp: 16 Temp: 97 °F (36.1 °C) Review of Systems: 
 
Gen: No fever, chills, malaise, weight loss/gain. Heent: No headache, rhinorrhea, epistaxis, ear pain, hearing loss, sinus pain, neck pain/stiffness, sore throat. Heart: No chest pain, palpitations, CHOW, pnd, or orthopnea. Resp: No cough, hemoptysis, wheezing and shortness of breath. GI: No nausea, vomiting, diarrhea, constipation, melena or hematochezia. : No urinary obstruction, dysuria or hematuria. Derm: see below Musc/skeletal: no bone or joint complains. Vasc: No edema, cyanosis or claudication. Endo: No heat/cold intolerance, no polyuria,polydipsia or polyphagia. Neuro: No unilateral weakness, numbness, tingling. No seizures. Heme: No easy bruising or bleeding. Wound Description: 
Refer to nursing notes for measurements. Ulcer Debridement Left plantar foot wound Character of Ulcer: Stable Indication for Debridement: Slough, Exudate, Abnormal wound edge and Abnormal Wound base Pre debridement measurements: 1.6 cm x 2.0 cm x 0.6 cm Risks of procedure were discussed with patient. Consent has been signed. Anesthetic: Lidocaine 4% topical cream  
 
Level of Debridement: Subctaneous Tissue Tissue and/or Material removed: Exudate, Fibrin/ Slough and Subcutaneous Type of Tissue: Non- Viable Pre-debridement severity: Fat Layer Exposed Post debridement severity: Fat Layer exposed Instrument(s) used: Scalpel Bleeding controlled with: Pressure Estimated blood loss: Minimal 
 
 Pre debridement measurements: 1.7 cm x 2.1 cm x 0.6 cm Post procedural pain: mild Patient tolerated procedure well. Right  plantar foot wound Character of Ulcer: Stable Indication for Debridement: Slough, Exudate, Abnormal wound edge and Abnormal Wound base Pre debridement measurements: 0.9 cm x 1.6 cm x 0.2 cm Risks of procedure were discussed with patient. Consent has been signed. Anesthetic: Lidocaine 4% topical cream  
 
Level of Debridement: Subctaneous Tissue Tissue and/or Material removed: Exudate, Fibrin/ Slough and Subcutaneous Type of Tissue: Non- Viable Pre-debridement severity: Fat Layer Exposed Post debridement severity: Fat Layer exposed Instrument(s) used: Scalpel Bleeding controlled with: Pressure Estimated blood loss: Minimal 
 
Postdebridement measurements: 1 cm x 1.7 cm x 0.3 cm Post procedural pain: mild Patient tolerated procedure well. Infection: yes to right foot Edema: mild edema Lower Extremity Circulation Reviewed patient's recent ABIs. Shows decrease from when the  
 
Offloading: Offloading padding to shoes Assessment: 1. Diabetic plantar heel ulcer left foot 2. Diabetic ulcer right foot-plantar 5th met base 3. Diabetic peripheral neuropathy 4. B/l cavus foot deformity Plan:  
-Patient seen and evaluated as noted above. 
-Wound debridement performed. 
-Discussed with patient that right foot wound isstable and that the left foot is stable. I believe that in this situation where we are unable to completely and adequately offload each foot, slow progress is to be expected. However, both wounds appear to be more clean and there is some skin islands noted to the left heel. 
-Endoform, gauze dressing to both feet. 
-Continue offloading padding in both shoes to allow equal pressure distribution.  -Patient to get diabetic shoes.  I discussed with the patient that some patients are able to be fitted for custom shoes once there ulcers are as superficial hers are. In this situation it may be helpful to see if we can obtain them at this stage to get the ulcers offloaded. I suspect that the right foot ulcer will heal before the left. If we are able to get the right foot healed and into a diabetic shoe, this could given us more options to offload the left foot (such as resuming TCC) if the right foot is in the appropriate shoe. 
-Follow-up next week with Dr. Russella Fothergill and myself in 2 weeks

## 2019-10-15 NOTE — WOUND CARE
10/15/19 1420 Wound Foot Right;Plantar;Lateral 07/30/19 Date First Assessed/Time First Assessed: 07/30/19 1034   Present on Hospital Admission: Yes  Wound Approximate Age at First Assessment (Weeks): 1 weeks  Primary Wound Type: Diabetic Ulcer  Location: Foot  Wound Location Orientation: Right;Plantar;Late. .. Post-Procedure Length (cm) 1 cm Post-Procedure Width (cm) 1.5 cm Post-Procedure Depth (cm) 0.5 cm Post-Procedure Volume (cm^3) 0.75 cm^3 Post-Procedure Surface Area (cm^2) 1.5 cm^2 Procedure Tolerated Well Wound Foot Left;Plantar Date First Assessed/Time First Assessed: 06/21/19 0857   Present on Hospital Admission: Yes  Primary Wound Type: Diabetic  Location: Foot  Wound Location Orientation: Left;Plantar Post-Procedure Length (cm) 1.5 cm Post-Procedure Width (cm) 2 cm Post-Procedure Depth (cm) 0.4 cm Post-Procedure Volume (cm^3) 1.2 cm^3 Post-Procedure Surface Area (cm^2) 3 cm^2 Procedure Tolerated Well

## 2019-10-15 NOTE — DISCHARGE INSTRUCTIONS
Bilateral heels  Clean with NS, apply endoform, gauze RG tape. . Every other day care by patient. Tamanna Chess Tamanna Chess Discharge Instructions for  [address]  Telephone: [phone]    NAME:  Anne Cardoza OF BIRTH:  1960  MEDICAL RECORD NUMBER:  [id number]  DATE:  10/15/2019    Wound Cleansing:   Do not scrub or use excessive force. Cleanse wound prior to applying a clean dressing with:  [x ] Normal Saline Dressings:           Wound Location bilateral heel wounds  [ ] Apply Primary Dressing: Fair Going        [ ] Cover and Secure with:     Ava Courser Gauze [ ] Arlana Linker [ ] Orlya Bonus   [ ] [place] [ ] Kristy Rather [ ] ABD     [ ] Other:    Avoid contact of tape with skin. [ ] Change dressing: [ ] Daily    [x ] Every Other Day [ ] Three times per week   [ ] Once a week [ ] Thais November Not Change Dressing   [ ] Other:    Return Appointment:  [ ] Wound and dressing supply provider:   [ ] ECF or Home Healthcare:  [ ] Wound Assessment: [ ] Physician or NP scheduled for Wound Assessment:   [ ] Return Appointment: With  1 week with Dr Zainab Guerrero, 2 weeks with Dr Beata Ndiaye  [ ] Ordered tests:      Electronically signed Loulou Chaudhari RN on 10/15/2019 at 2:14 PM     Marisela Cruz 281: Should you experience any significant changes in your wound(s) or have questions about your wound care, please contact the [address] [place] [address] at [address] [place] [address] 8:00 am - 4:30. If you need help with your wound outside these hours and cannot wait until we are again available, contact your PCP or go to the hospital emergency room. PLEASE NOTE: IF YOU ARE UNABLE TO OBTAIN WOUND SUPPLIES, CONTINUE TO USE THE SUPPLIES YOU HAVE AVAILABLE UNTIL YOU ARE ABLE TO REACH US. IT IS MOST IMPORTANT TO KEEP THE WOUND COVERED AT ALL TIMES.      Physician Signature:_______________________    Date: ___________ Time:  ____________

## 2019-10-15 NOTE — WOUND CARE
10/15/19 1409 Wound Foot Right;Plantar;Lateral 07/30/19 Date First Assessed/Time First Assessed: 07/30/19 1034   Present on Hospital Admission: Yes  Wound Approximate Age at First Assessment (Weeks): 1 weeks  Primary Wound Type: Diabetic Ulcer  Location: Foot  Wound Location Orientation: Right;Plantar;Late. .. Wound Procedure Type Debridement- Surgical  
Procedure Time Out 1993 Consent Obtained  Yes Procedure Bleeding Moderate Procedure Hemostasis  Silver Nitrate Type of Tissue Removed  Devitalized Procedure Instrument  Blade;Curette Procedure Pain Scale Numeric 0/10 Debridement Procedure Performed by DR Rhiannon Ni Wound Foot Left;Plantar Date First Assessed/Time First Assessed: 06/21/19 0857   Present on Hospital Admission: Yes  Primary Wound Type: Diabetic  Location: Foot  Wound Location Orientation: Left;Plantar Wound Procedure Type Debridement- Surgical  
Procedure Time Out 8906 Consent Obtained  Yes Procedure Bleeding Moderate Procedure Hemostasis  Silver Nitrate Type of Tissue Removed  Devitalized Procedure Instrument  Blade;Curette Procedure Pain Scale Numeric 0/10 Debridement Procedure Performed by Dr Rhiannon Ni Post Procedure Pain Scale Numeric 0/10 Procedure Tolerated Well

## 2019-10-15 NOTE — WOUND CARE
10/15/19 1425 Wound Foot Right;Plantar;Lateral 07/30/19 Date First Assessed/Time First Assessed: 07/30/19 1034   Present on Hospital Admission: Yes  Wound Approximate Age at First Assessment (Weeks): 1 weeks  Primary Wound Type: Diabetic Ulcer  Location: Foot  Wound Location Orientation: Right;Plantar;Late. .. Cleansing and Cleansing Agents  Normal saline Dressing Type Applied Collagens/cell matrix;Gauze;Gauze wrap (arnie) 
(Endoform, gauze, roll gauze.) Wound Foot Left;Plantar Date First Assessed/Time First Assessed: 06/21/19 0857   Present on Hospital Admission: Yes  Primary Wound Type: Diabetic  Location: Foot  Wound Location Orientation: Left;Plantar Cleansing and Cleansing Agents  Normal saline Dressing Type Applied Gauze;Gauze wrap (arnie); Collagens/cell matrix 
(Endoform, Gauze, Roll Gauze.) Procedure Photos: Post- 
 
 
 
Discharge Condition: Stable Pain: 0 Ambulatory Status: Walking Discharge Destination: Home Transportation: Car Accompanied by: Self Discharge instructions reviewed with Patient and copy or written instructions have been provided. All questions/concerns have been addressed at this time.

## 2019-10-15 NOTE — WOUND CARE
10/15/19 1328 Wound Foot Right;Plantar;Lateral 07/30/19 Date First Assessed/Time First Assessed: 07/30/19 1034   Present on Hospital Admission: Yes  Wound Approximate Age at First Assessment (Weeks): 1 weeks  Primary Wound Type: Diabetic Ulcer  Location: Foot  Wound Location Orientation: Right;Plantar;Late. .. Dressing Status Removed Dressing Type Gauze;Gauze wrap (arnie); Special tape (comment) Wound Length (cm) 0.9 cm Wound Width (cm) 1.6 cm Wound Depth (cm) 0.2 cm Wound Volume (cm^3) 0.29 cm^3 Condition of Base Pink Condition of Edges Calloused Drainage Amount Moderate Drainage Color Serosanguinous Wound Odor None Sandra-wound Assessment Intact Cleansing and Cleansing Agents  Normal saline Wound Foot Left;Plantar Date First Assessed/Time First Assessed: 06/21/19 0857   Present on Hospital Admission: Yes  Primary Wound Type: Diabetic  Location: Foot  Wound Location Orientation: Left;Plantar Dressing Status Removed Dressing Type Gauze;Gauze wrap (arnie); Special tape (comment) Wound Length (cm) 1.6 cm Wound Width (cm) 2 cm Wound Depth (cm) 0.6 cm Wound Volume (cm^3) 1.92 cm^3 Condition of Base Granulation;Pink Condition of Edges Calloused; Open Undermining (cm) 0.7 cm 
(@ 5) Direction of Undermining 2 o'clock 
(to 5) Drainage Amount Moderate Drainage Color Serosanguinous Wound Odor None Sandra-wound Assessment Intact Cleansing and Cleansing Agents  Normal saline Wound Breast Left Date First Assessed/Time First Assessed: 06/07/19 0834   Location: Breast  Wound Location Orientation: Left Dressing Status Removed Dressing Type Gauze;Special tape (comment) Condition of Base Pink Condition of Edges Open Drainage Amount Small Drainage Color Serosanguinous Wound Odor None Sandra-wound Assessment Intact Cleansing and Cleansing Agents  Normal saline Dressing Type Applied Collagens/cell matrix; Foam 
(endoform, border foam) Visit Vitals /72 Pulse 69 Temp 97 °F (36.1 °C) Resp 16 LLE Peripheral Vascular Capillary Refill: Less than/equal to 3 seconds (10/15/19 1327) Color: Appropriate for race (10/15/19 1327) Temperature: Warm (10/15/19 1327) Sensation: Present (10/15/19 1327) Pedal Pulse: Palpable (10/15/19 1327) RLE Peripheral Vascular Capillary Refill: Less than/equal to 3 seconds (10/15/19 1327) Color: Appropriate for race (10/15/19 1327) Temperature: Warm (10/15/19 1327) Sensation: Present (10/15/19 1327) Pedal Pulse: Palpable (10/15/19 1327)

## 2019-10-25 ENCOUNTER — HOSPITAL ENCOUNTER (OUTPATIENT)
Dept: WOUND CARE | Age: 59
Discharge: HOME | End: 2019-10-25
Payer: COMMERCIAL

## 2019-10-25 VITALS
DIASTOLIC BLOOD PRESSURE: 81 MMHG | HEART RATE: 81 BPM | TEMPERATURE: 97.2 F | RESPIRATION RATE: 16 BRPM | SYSTOLIC BLOOD PRESSURE: 189 MMHG

## 2019-10-25 PROCEDURE — 74011000250 HC RX REV CODE- 250: Performed by: OTOLARYNGOLOGY

## 2019-10-25 PROCEDURE — 11042 DBRDMT SUBQ TIS 1ST 20SQCM/<: CPT

## 2019-10-25 PROCEDURE — 17250 CHEM CAUT OF GRANLTJ TISSUE: CPT

## 2019-10-25 RX ADMIN — Medication: at 08:51

## 2019-10-25 NOTE — WOUND CARE
10/25/19 1009 Wound Breast Left Date First Assessed/Time First Assessed: 06/07/19 0834   Location: Breast  Wound Location Orientation: Left Dressing Type Applied Xeroform;Gauze;Special tape (comment) Wound Foot Right;Plantar;Lateral 07/30/19 Date First Assessed/Time First Assessed: 07/30/19 1034   Present on Hospital Admission: Yes  Wound Approximate Age at First Assessment (Weeks): 1 weeks  Primary Wound Type: Diabetic Ulcer  Location: Foot  Wound Location Orientation: Right;Plantar;Late. .. Dressing Type Applied Foam;Gauze;Gauze wrap (arnie); Special tape (comment) (window pane with foam and HFBR to wound) Wound Foot Left;Plantar Date First Assessed/Time First Assessed: 06/21/19 0857   Present on Hospital Admission: Yes  Primary Wound Type: Diabetic  Location: Foot  Wound Location Orientation: Left;Plantar Dressing Type Applied Foam;Gauze;Gauze wrap (arnie); Special tape (comment) (window pane with foam and HFBR to wound) [place] Muck Discharge Condition: Stable Pain: 0 Ambulatory Status: Walking Discharge Destination: Home Transportation: Car Accompanied by: Self Discharge instructions reviewed with Patient and copy or written instructions have been provided. All questions/concerns have been addressed at this time.

## 2019-10-25 NOTE — PROGRESS NOTES
[place] [address] 
WOUND CARE PROGRESS NOTE Name:  Jewel Flower 
MR#:  [id number] :  1960 ACCOUNT #:  [de-identified] DATE OF SERVICE:  10/25/2019 SUBJECTIVE:  The patient returns for treatment of a left heel diabetic foot ulcer E11.621, which goes down to fat L97.422 and a right lateral foot diabetic foot ulcer L97.412 down to fat and a left breast wound S21.002D. 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 She mostly drives and her  goes to the home doors, but she does admit to walking more than she has been recently. She denies any problems over the week other than the fact that she is aware of more discomfort in the left heel wound since increasing her ambulation. OBJECTIVE: 
VITAL SIGNS:  Her temperature today is 97.2, pulse 81, respirations 16, blood pressure 189/81. WOUND EXAMINATION:  The wound of the right lateral plantar foot measures 1 x 1 x 0.2 cm. It is surrounded by a rim of callus and is covered by a layer of slough. It is recommended that the callus be pared down and the wound be debrided. I explained the risks and benefits. The patient understood and wished to proceed. Therefore, topical Xylocaine 4% gel was applied for 10 minutes. Using a 5-mm ring curette, the periwound callus was pared down. The edges of the wound was stripped back to healthy bleeding skin edges and the surface of the wound was debrided down to nice healthy bleeding subcutaneous tissue. Post-debridement dimensions were 0.9 x 1.7 x 0.2 cm. The wound of the left plantar heel are 2.2 x 2 x 0.6 cm. There is periwound callus. There is a deep tissue injury posteriorly showing a lot of pressure getting over the wound and there is a layer of slough over the wound, which was malodorous. 4% gel was applied here for 10 minutes. Using a 5-mm ring curette, the callus was pared down.   The edges of the wound were stripped out. The surface of the wound was debrided of all devitalized tissue down to healthy bleeding tissue. The deep tissue was then sterilized with topical isopropyl alcohol, which was allowed to air dry. This was then irrigated clear with saline and a deeper debridement was done and this was sent for aerobic culture and sensitivity. Post-debridement dimensions were 2.2 x 2.2 x 0.8 cm. The wound of the left breast is a hypertrophic wound over the area of the nipple measuring 0.9 x 1.4 x 0.1 cm. The patient would rather have skin epithelialized across it than have a protuberant nipple and therefore, I recommended that we cauterize it with silver nitrate to create involution of the hypergranular tissue. I explained the risks and benefits. The patient understood and wished to proceed. Therefore, in this anesthetic area, I used 1 single stick of silver nitrate to flatten the hypergranular tissue. It was then irrigated clear with saline. ASSESSMENT AND PLAN:  We are going to cover the left breast wound with Xeroform just to protect it and to minimize friction between her tissue and overlying clothing. We are going to change the foot wound dressings to [facility] for their bacteriostatic properties and to help absorption of drainage since the wounds were fairly moist and macerated. We are also going to windowpane with foam around these wounds to try to protect them from more pressure. We will await the culture and sensitivity, but empirically, we are going to start her on clindamycin 300 mg b.i.d., which is the safest antibiotic that we can use while she is on warfarin.   She is going to follow up in this wound clinic with Dr. Lisa Bonner next week just to make sure we are on a good track with her DFUs, and I will see her again in a few weeks simply to check the left breast wound, but now most of the management will be done by Dr. Lisa Bonner for the diabetic foot ulcers. All questions were answered. Her condition on discharge is stable. William Fair MD 
 
 
[id number] 
D:  10/25/2019 10:12 
T:  10/25/2019 11:34 
JOB #:  [id number]

## 2019-10-25 NOTE — WOUND CARE
10/25/19 0830 Wound Breast Left Date First Assessed/Time First Assessed: 06/07/19 0834   Location: Breast  Wound Location Orientation: Left Dressing Status Removed Dressing Type Foam;Collagens/cell matrix Wound Length (cm) 0.9 cm Wound Width (cm) 1.4 cm Wound Depth (cm) 0.1 cm Wound Volume (cm^3) 0.13 cm^3 Condition of Base Pink Condition of Edges Open Drainage Amount Moderate Drainage Color Serous Wound Odor None Sandra-wound Assessment Intact Cleansing and Cleansing Agents  Normal saline Wound Foot Right;Plantar;Lateral 07/30/19 Date First Assessed/Time First Assessed: 07/30/19 1034   Present on Hospital Admission: Yes  Wound Approximate Age at First Assessment (Weeks): 1 weeks  Primary Wound Type: Diabetic Ulcer  Location: Foot  Wound Location Orientation: Right;Plantar;Late. .. Dressing Status Removed Dressing Type Collagens/cell matrix;Gauze;Gauze wrap (arnie); Special tape (comment) Wound Length (cm) 1 cm Wound Width (cm) 1 cm Wound Depth (cm) 0.2 cm Wound Volume (cm^3) 0.2 cm^3 Condition of Base Pink Condition of Edges Calloused Sandra-wound Assessment Maceration Cleansing and Cleansing Agents  Normal saline Wound Foot Left;Plantar Date First Assessed/Time First Assessed: 06/21/19 0857   Present on Hospital Admission: Yes  Primary Wound Type: Diabetic  Location: Foot  Wound Location Orientation: Left;Plantar Dressing Status Removed Dressing Type Collagens/cell matrix;Gauze;Gauze wrap (arnie); Special tape (comment) Wound Length (cm) 2.2 cm Wound Width (cm) 2 cm Wound Depth (cm) 0.6 cm Wound Volume (cm^3) 2.64 cm^3 Condition of Base [place];Slough (maceration) Condition of Edges Calloused; Open 
(open) Undermining (cm) 0.8 cm 
(at 4 o clock) Direction of Undermining 2 o'clock ( to 4 o clock) Drainage Amount Moderate Drainage Color Serosanguinous Wound Odor None Sandra-wound Assessment Maceration Cleansing and Cleansing Agents  Normal saline 10/25/19 0830 Wound Breast Left Date First Assessed/Time First Assessed: 06/07/19 0834   Location: Breast  Wound Location Orientation: Left Dressing Status Removed Dressing Type Foam;Collagens/cell matrix Wound Length (cm) 0.9 cm Wound Width (cm) 1.4 cm Wound Depth (cm) 0.1 cm Wound Volume (cm^3) 0.13 cm^3 Condition of Base Pink Condition of Edges Open Drainage Amount Moderate Drainage Color Serous Wound Odor None Sandra-wound Assessment Intact Cleansing and Cleansing Agents  Normal saline Wound Foot Right;Plantar;Lateral 07/30/19 Date First Assessed/Time First Assessed: 07/30/19 1034   Present on Hospital Admission: Yes  Wound Approximate Age at First Assessment (Weeks): 1 weeks  Primary Wound Type: Diabetic Ulcer  Location: Foot  Wound Location Orientation: Right;Plantar;Late. .. Dressing Status Removed Dressing Type Collagens/cell matrix;Gauze;Gauze wrap (arnie); Special tape (comment) Wound Length (cm) 1 cm Wound Width (cm) 1 cm Wound Depth (cm) 0.2 cm Wound Volume (cm^3) 0.2 cm^3 Condition of Base Pink Condition of Edges Calloused Sandra-wound Assessment Maceration Cleansing and Cleansing Agents  Normal saline Wound Foot Left;Plantar Date First Assessed/Time First Assessed: 06/21/19 0857   Present on Hospital Admission: Yes  Primary Wound Type: Diabetic  Location: Foot  Wound Location Orientation: Left;Plantar Dressing Status Removed Dressing Type Collagens/cell matrix;Gauze;Gauze wrap (arnie); Special tape (comment) Wound Length (cm) 2.2 cm Wound Width (cm) 2 cm Wound Depth (cm) 0.6 cm Wound Volume (cm^3) 2.64 cm^3 Condition of Base [place];Slough (maceration) Condition of Edges Calloused; Open 
(open) Undermining (cm) 0.8 cm 
(at 4 o clock) Direction of Undermining 2 o'clock ( to 4 o clock) Drainage Amount Moderate Drainage Color Serosanguinous Wound Odor None Sandra-wound Assessment Maceration Cleansing and Cleansing Agents  Normal saline 10/25/19 0830 Wound Breast Left Date First Assessed/Time First Assessed: 06/07/19 0834   Location: Breast  Wound Location Orientation: Left Dressing Status Removed Dressing Type Foam;Collagens/cell matrix Wound Length (cm) 0.9 cm Wound Width (cm) 1.4 cm Wound Depth (cm) 0.1 cm Wound Volume (cm^3) 0.13 cm^3 Condition of Base Pink Condition of Edges Open Drainage Amount Moderate Drainage Color Serous Wound Odor None Sandra-wound Assessment Intact Cleansing and Cleansing Agents  Normal saline Wound Foot Right;Plantar;Lateral 07/30/19 Date First Assessed/Time First Assessed: 07/30/19 1034   Present on Hospital Admission: Yes  Wound Approximate Age at First Assessment (Weeks): 1 weeks  Primary Wound Type: Diabetic Ulcer  Location: Foot  Wound Location Orientation: Right;Plantar;Late. .. Dressing Status Removed Dressing Type Collagens/cell matrix;Gauze;Gauze wrap (arnie); Special tape (comment) Wound Length (cm) 1 cm Wound Width (cm) 1 cm Wound Depth (cm) 0.2 cm Wound Volume (cm^3) 0.2 cm^3 Condition of Base Pink Condition of Edges Calloused Sandra-wound Assessment Maceration Cleansing and Cleansing Agents  Normal saline Wound Foot Left;Plantar Date First Assessed/Time First Assessed: 06/21/19 0857   Present on Hospital Admission: Yes  Primary Wound Type: Diabetic  Location: Foot  Wound Location Orientation: Left;Plantar Dressing Status Removed Dressing Type Collagens/cell matrix;Gauze;Gauze wrap (arnie); Special tape (comment) Wound Length (cm) 2.2 cm Wound Width (cm) 2 cm Wound Depth (cm) 0.6 cm Wound Volume (cm^3) 2.64 cm^3 Condition of Base [place];Slough (maceration) Condition of Edges Calloused; Open 
(open) Undermining (cm) 0.8 cm 
(at 4 o clock) Direction of Undermining 2 o'clock ( to 4 o clock) Drainage Amount Moderate Drainage Color Serosanguinous Wound Odor None Sandra-wound Assessment Maceration Cleansing and Cleansing Agents  Normal saline 10/25/19 0830 Wound Breast Left Date First Assessed/Time First Assessed: 06/07/19 0834   Location: Breast  Wound Location Orientation: Left Dressing Status Removed Dressing Type Foam;Collagens/cell matrix Wound Length (cm) 0.9 cm Wound Width (cm) 1.4 cm Wound Depth (cm) 0.1 cm Wound Volume (cm^3) 0.13 cm^3 Condition of Base Pink Condition of Edges Open Drainage Amount Moderate Drainage Color Serous Wound Odor None Sandra-wound Assessment Intact Cleansing and Cleansing Agents  Normal saline Wound Foot Right;Plantar;Lateral 07/30/19 Date First Assessed/Time First Assessed: 07/30/19 1034   Present on Hospital Admission: Yes  Wound Approximate Age at First Assessment (Weeks): 1 weeks  Primary Wound Type: Diabetic Ulcer  Location: Foot  Wound Location Orientation: Right;Plantar;Late. .. Dressing Status Removed Dressing Type Collagens/cell matrix;Gauze;Gauze wrap (arnie); Special tape (comment) Wound Length (cm) 1 cm Wound Width (cm) 1 cm Wound Depth (cm) 0.2 cm Wound Volume (cm^3) 0.2 cm^3 Condition of Base Pink Condition of Edges Calloused Sandra-wound Assessment Maceration Cleansing and Cleansing Agents  Normal saline Wound Foot Left;Plantar Date First Assessed/Time First Assessed: 06/21/19 0857   Present on Hospital Admission: Yes  Primary Wound Type: Diabetic  Location: Foot  Wound Location Orientation: Left;Plantar Dressing Status Removed Dressing Type Collagens/cell matrix;Gauze;Gauze wrap (arnie); Special tape (comment) Wound Length (cm) 2.2 cm Wound Width (cm) 2 cm Wound Depth (cm) 0.6 cm Wound Volume (cm^3) 2.64 cm^3 Condition of Base [place];Slough (maceration) Condition of Edges Calloused; Open 
(open) Undermining (cm) 0.8 cm 
(at 4 o clock) Direction of Undermining 2 o'clock ( to 4 o clock) Drainage Amount Moderate Drainage Color Serosanguinous Wound Odor None Sandra-wound Assessment Maceration Cleansing and Cleansing Agents  Normal saline 10/25/19 0830 Wound Breast Left Date First Assessed/Time First Assessed: 06/07/19 0834   Location: Breast  Wound Location Orientation: Left Dressing Status Removed Dressing Type Foam;Collagens/cell matrix Wound Length (cm) 0.9 cm Wound Width (cm) 1.4 cm Wound Depth (cm) 0.1 cm Wound Volume (cm^3) 0.13 cm^3 Condition of Base Pink Condition of Edges Open Drainage Amount Moderate Drainage Color Serous Wound Odor None Sandra-wound Assessment Intact Cleansing and Cleansing Agents  Normal saline Wound Foot Right;Plantar;Lateral 07/30/19 Date First Assessed/Time First Assessed: 07/30/19 1034   Present on Hospital Admission: Yes  Wound Approximate Age at First Assessment (Weeks): 1 weeks  Primary Wound Type: Diabetic Ulcer  Location: Foot  Wound Location Orientation: Right;Plantar;Late. .. Dressing Status Removed Dressing Type Collagens/cell matrix;Gauze;Gauze wrap (arnie); Special tape (comment) Wound Length (cm) 1 cm Wound Width (cm) 1 cm Wound Depth (cm) 0.2 cm Wound Volume (cm^3) 0.2 cm^3 Condition of Base Pink Condition of Edges Calloused Sandra-wound Assessment Maceration Cleansing and Cleansing Agents  Normal saline Wound Foot Left;Plantar Date First Assessed/Time First Assessed: 06/21/19 0857   Present on Hospital Admission: Yes  Primary Wound Type: Diabetic  Location: Foot  Wound Location Orientation: Left;Plantar Dressing Status Removed Dressing Type Collagens/cell matrix;Gauze;Gauze wrap (arnie); Special tape (comment) Wound Length (cm) 2.2 cm Wound Width (cm) 2 cm Wound Depth (cm) 0.6 cm Wound Volume (cm^3) 2.64 cm^3 Condition of Base [place];Slough (maceration) Condition of Edges Calloused; Open 
(open) Undermining (cm) 0.8 cm 
(at 4 o clock) Direction of Undermining 2 o'clock ( to 4 o clock) Drainage Amount Moderate Drainage Color Serosanguinous Wound Odor None Sandra-wound Assessment Maceration Cleansing and Cleansing Agents  Normal saline 10/25/19 0830 Wound Breast Left Date First Assessed/Time First Assessed: 06/07/19 0834   Location: Breast  Wound Location Orientation: Left Dressing Status Removed Dressing Type Foam;Collagens/cell matrix Wound Length (cm) 0.9 cm Wound Width (cm) 1.4 cm Wound Depth (cm) 0.1 cm Wound Volume (cm^3) 0.13 cm^3 Condition of Base Pink Condition of Edges Open Drainage Amount Moderate Drainage Color Serous Wound Odor None Sandra-wound Assessment Intact Cleansing and Cleansing Agents  Normal saline Wound Foot Right;Plantar;Lateral 07/30/19 Date First Assessed/Time First Assessed: 07/30/19 1034   Present on Hospital Admission: Yes  Wound Approximate Age at First Assessment (Weeks): 1 weeks  Primary Wound Type: Diabetic Ulcer  Location: Foot  Wound Location Orientation: Right;Plantar;Late. .. Dressing Status Removed Dressing Type Collagens/cell matrix;Gauze;Gauze wrap (arnie); Special tape (comment) Wound Length (cm) 1 cm Wound Width (cm) 1 cm Wound Depth (cm) 0.2 cm Wound Volume (cm^3) 0.2 cm^3 Condition of Base Pink Condition of Edges Calloused Sandra-wound Assessment Maceration Cleansing and Cleansing Agents  Normal saline Wound Foot Left;Plantar Date First Assessed/Time First Assessed: 06/21/19 0857   Present on Hospital Admission: Yes  Primary Wound Type: Diabetic  Location: Foot  Wound Location Orientation: Left;Plantar Dressing Status Removed Dressing Type Collagens/cell matrix;Gauze;Gauze wrap (arnie); Special tape (comment) Wound Length (cm) 2.2 cm Wound Width (cm) 2 cm Wound Depth (cm) 0.6 cm Wound Volume (cm^3) 2.64 cm^3 Condition of Base [place];Slough (maceration) Condition of Edges Calloused; Open 
(open) Undermining (cm) 0.8 cm 
(at 4 o clock) Direction of Undermining 2 o'clock ( to 4 o clock) Drainage Amount Moderate Drainage Color Serosanguinous Wound Odor None Sandra-wound Assessment Maceration Cleansing and Cleansing Agents  Normal saline 10/25/19 0830 Wound Breast Left Date First Assessed/Time First Assessed: 06/07/19 0834   Location: Breast  Wound Location Orientation: Left Dressing Status Removed Dressing Type Foam;Collagens/cell matrix Wound Length (cm) 0.9 cm Wound Width (cm) 1.4 cm Wound Depth (cm) 0.1 cm Wound Volume (cm^3) 0.13 cm^3 Condition of Base Pink Condition of Edges Open Drainage Amount Moderate Drainage Color Serous Wound Odor None Sandra-wound Assessment Intact Cleansing and Cleansing Agents  Normal saline Wound Foot Right;Plantar;Lateral 07/30/19 Date First Assessed/Time First Assessed: 07/30/19 1034   Present on Hospital Admission: Yes  Wound Approximate Age at First Assessment (Weeks): 1 weeks  Primary Wound Type: Diabetic Ulcer  Location: Foot  Wound Location Orientation: Right;Plantar;Late. .. Dressing Status Removed Dressing Type Collagens/cell matrix;Gauze;Gauze wrap (arnie); Special tape (comment) Wound Length (cm) 1 cm Wound Width (cm) 1 cm Wound Depth (cm) 0.2 cm Wound Volume (cm^3) 0.2 cm^3 Condition of Base Pink Condition of Edges Calloused Sandra-wound Assessment Maceration Cleansing and Cleansing Agents  Normal saline Wound Foot Left;Plantar Date First Assessed/Time First Assessed: 06/21/19 0857   Present on Hospital Admission: Yes  Primary Wound Type: Diabetic  Location: Foot  Wound Location Orientation: Left;Plantar Dressing Status Removed Dressing Type Collagens/cell matrix;Gauze;Gauze wrap (arnie); Special tape (comment) Wound Length (cm) 2.2 cm Wound Width (cm) 2 cm Wound Depth (cm) 0.6 cm Wound Volume (cm^3) 2.64 cm^3 Condition of Base [place];Slough (maceration) Condition of Edges Calloused; Open 
(open) Undermining (cm) 0.8 cm 
(at 4 o clock) Direction of Undermining 2 o'clock ( to 4 o clock) Drainage Amount Moderate Drainage Color Serosanguinous Wound Odor None Sandra-wound Assessment Maceration Cleansing and Cleansing Agents  Normal saline 10/25/19 0830 Wound Breast Left Date First Assessed/Time First Assessed: 06/07/19 0834   Location: Breast  Wound Location Orientation: Left Dressing Status Removed Dressing Type Foam;Collagens/cell matrix Wound Length (cm) 0.9 cm Wound Width (cm) 1.4 cm Wound Depth (cm) 0.1 cm Wound Volume (cm^3) 0.13 cm^3 Condition of Base Pink Condition of Edges Open Drainage Amount Moderate Drainage Color Serous Wound Odor None Sandra-wound Assessment Intact Cleansing and Cleansing Agents  Normal saline Wound Foot Right;Plantar;Lateral 07/30/19 Date First Assessed/Time First Assessed: 07/30/19 1034   Present on Hospital Admission: Yes  Wound Approximate Age at First Assessment (Weeks): 1 weeks  Primary Wound Type: Diabetic Ulcer  Location: Foot  Wound Location Orientation: Right;Plantar;Late. .. Dressing Status Removed Dressing Type Collagens/cell matrix;Gauze;Gauze wrap (arnie); Special tape (comment) Wound Length (cm) 1 cm Wound Width (cm) 1 cm Wound Depth (cm) 0.2 cm Wound Volume (cm^3) 0.2 cm^3 Condition of Base Pink Condition of Edges Calloused Sandra-wound Assessment Maceration Cleansing and Cleansing Agents  Normal saline Wound Foot Left;Plantar Date First Assessed/Time First Assessed: 06/21/19 0857   Present on Hospital Admission: Yes  Primary Wound Type: Diabetic  Location: Foot  Wound Location Orientation: Left;Plantar Dressing Status Removed Dressing Type Collagens/cell matrix;Gauze;Gauze wrap (arnie); Special tape (comment) Wound Length (cm) 2.2 cm Wound Width (cm) 2 cm Wound Depth (cm) 0.6 cm Wound Volume (cm^3) 2.64 cm^3 Condition of Base [place];Slough (maceration) Condition of Edges Calloused; Open 
(open) Undermining (cm) 0.8 cm (at 4 o clock) Direction of Undermining 2 o'clock ( to 4 o clock) Drainage Amount Moderate Drainage Color Serosanguinous Wound Odor None Sandra-wound Assessment Maceration Cleansing and Cleansing Agents  Normal saline 10/25/19 0830 Wound Breast Left Date First Assessed/Time First Assessed: 06/07/19 0834   Location: Breast  Wound Location Orientation: Left Dressing Status Removed Dressing Type Foam;Collagens/cell matrix Wound Length (cm) 0.9 cm Wound Width (cm) 1.4 cm Wound Depth (cm) 0.1 cm Wound Volume (cm^3) 0.13 cm^3 Condition of Base Pink Condition of Edges Open Drainage Amount Moderate Drainage Color Serous Wound Odor None Sandra-wound Assessment Intact Cleansing and Cleansing Agents  Normal saline Wound Foot Right;Plantar;Lateral 07/30/19 Date First Assessed/Time First Assessed: 07/30/19 1034   Present on Hospital Admission: Yes  Wound Approximate Age at First Assessment (Weeks): 1 weeks  Primary Wound Type: Diabetic Ulcer  Location: Foot  Wound Location Orientation: Right;Plantar;Late. .. Dressing Status Removed Dressing Type Collagens/cell matrix;Gauze;Gauze wrap (arnie); Special tape (comment) Wound Length (cm) 1 cm Wound Width (cm) 1 cm Wound Depth (cm) 0.2 cm Wound Volume (cm^3) 0.2 cm^3 Condition of Base Pink Condition of Edges Calloused Sandra-wound Assessment Maceration Cleansing and Cleansing Agents  Normal saline Wound Foot Left;Plantar Date First Assessed/Time First Assessed: 06/21/19 0857   Present on Hospital Admission: Yes  Primary Wound Type: Diabetic  Location: Foot  Wound Location Orientation: Left;Plantar Dressing Status Removed Dressing Type Collagens/cell matrix;Gauze;Gauze wrap (arnie); Special tape (comment) Wound Length (cm) 2.2 cm Wound Width (cm) 2 cm Wound Depth (cm) 0.6 cm Wound Volume (cm^3) 2.64 cm^3 Condition of Base [place];Slough (maceration) Condition of Edges Calloused; Open 
(open) Undermining (cm) 0.8 cm 
(at 4 o clock) Direction of Undermining 2 o'clock ( to 4 o clock) Drainage Amount Moderate Drainage Color Serosanguinous Wound Odor None Sandra-wound Assessment Maceration Cleansing and Cleansing Agents  Normal saline Visit Vitals /81 (BP 1 Location: Right arm, BP Patient Position: Sitting) Pulse 81 Temp 97.2 °F (36.2 °C) Resp 16 LLE Peripheral Vascular Capillary Refill: Less than/equal to 3 seconds (10/25/19 0845) Color: Appropriate for race (10/25/19 0845) Temperature: Warm (10/25/19 0845) Sensation: Present (10/25/19 0845) Pedal Pulse: Palpable (10/25/19 0845) RLE Peripheral Vascular Capillary Refill: Less than/equal to 3 seconds (10/25/19 0845) Color: Appropriate for race (10/25/19 0845) Temperature: Warm (10/25/19 0845) Pedal Pulse: Palpable (10/25/19 0845)

## 2019-10-25 NOTE — DISCHARGE INSTRUCTIONS
Bilat foot wounds - cleanse with saline, windowpane with Mepilex white nonadherent foam, pack wounds with Hydrofera blue ready, secure with roll gauze and tape, change every other day. Left breast wound - cleanse with saline, apply xeroform, cover with 2x2, secure with tape, change 2 x week. Discharge Instructions for  [address], [address] [place] [address]  Telephone: [phone]    NAME:  Mariah Gomez OF BIRTH:  1960  MEDICAL RECORD NUMBER:  [id number]  DATE:  10/25/2019    Wound Cleansing:   Do not scrub or use excessive force. Cleanse wound prior to applying a clean dressing with:  [x ] Normal Saline [ ] Keep Wound Dry in Shower    [ ] Lamount Spitz   [ ] Cleanse wound with Mild Soap & Water  [ ] May Shower at Discharge   [ ] Other:      Topical Treatments:  Do not apply lotions, creams, or ointments to wound bed unless directed. [ ] Apply moisturizing lotion to skin surrounding the wound prior to dressing change. [ ] Apply antifungal ointment to skin surrounding the wound prior to dressing change. [ ] Apply thin film of moisture barrier ointment to skin immediately around wound.   [ ] Other:       Dressings:           Wound Location Bilat foot wounds  [x ] Apply Primary Dressing:       [ ] MediHoney Gel [ ] Alginate with Silver [ ] Alginate   [ ] Collagen [ ] Collagen with Silver   [ ] Santyl with Moisten saline gauze     [ ] Hydrocolloid   [ ] Paulette Danya Alginate [ ] Foam with Silver   [ ] Foam   [x ] Hydrofera Blue  Ready  [ ] Neoma Adilene    [ ] Moisten with Saline [ ] Hydrogel [ ] Leonette Elders  [ ] Betadine moist gauze   [ ] Bactroban/Mupirocin [ ] Eduardo Jarad  [x ] Other:  Windowpane with Mepilex white nonadherent foam  [ ] Pack wound loosely with  [ ] Iodoform   [ ] Marvene Hakim  [ ] Other   [x ] Cover and Secure with:     [ ] Gauze [x ] Fernando Peals [ ] Mckenzie Pace   [ ] Ctrax Marylou [ ] Lam Desai [ ] ABD [ ] [facility] ] Other: tape   Avoid contact of tape with skin. [ x] Change dressing: [ ] Daily    [x ] Every Other Day [ ] Three times per week   [ ] Once a week [ ] Do Not Change Dressing   [ ] Other:     Edema Control:  Apply: [ x] Compression Stocking [x ]Right Leg [x ]Left Leg   [ ] Tubigrip [ ]Right Leg Double Layer [ ]Left Leg Double Layer       [ ]Right Leg Single Layer [ ]Left Leg Single Layer   [ ] SpandaGrip [ ]Right Leg  [ ]Left Leg      [ ]Low compression 5-10 mm/Hg      [ ]Medium compression 10-20 mm/Hg     [ ]High compression  20-30 mm/Hg   every morning immediately when getting up should be applied to affected leg(s) from mid foot to knee making sure to cover the heel. Remove every night before going to bed. [x ] Elevate leg(s) above the level of the heart when sitting. [x] Avoid prolonged standing in one place. [ ] Elevate arm/hand above the level of the heart [ ]RightArm [ ]LeftArm     Dietary:  [x] Diet as tolerated: [x ] Diabetic Diet: Low carb no sugar [x ] No Added Salt:  [ ] Increase Protein: [ ] Other:   Activity:  [ ] Activity as tolerated:  [ ] Patient has no activity restrictions     [ ] Strict Bedrest: [ ] Remain off Work:     [ ] May return to full duty work:                                   [ ] Return to work with restrictions:   [address] [place] [address]   Return Appointment:  [ ] Wound and dressing supply provider:   [ ] ECF or Home Healthcare:  [ ] Wound Assessment: [ ] Physician or NP scheduled for Wound Assessment:   [x ] Return Appointment: With Dr. Hope Stover in  10/29/19 Week(s)  [ ] Ordered tests:      Electronically signed Shankar Yost RN on 10/25/2019 at [address] [facility]: Should you experience any significant changes in your wound(s) or have questions about your wound care, please contact the [address] at [address] [place] [address] 8:00 am - 4:30.   If you need help with your wound outside these hours and cannot wait until we are again available, contact your PCP or go to the hospital emergency room. PLEASE NOTE: IF YOU ARE UNABLE TO OBTAIN WOUND SUPPLIES, CONTINUE TO USE THE SUPPLIES YOU HAVE AVAILABLE UNTIL YOU ARE ABLE TO REACH US. IT IS MOST IMPORTANT TO KEEP THE WOUND COVERED AT ALL TIMES.      Physician Signature:_______________________    Date: ___________ Time:  ____________

## 2019-10-29 ENCOUNTER — HOSPITAL ENCOUNTER (OUTPATIENT)
Dept: WOUND CARE | Age: 59
Discharge: HOME | End: 2019-10-29
Payer: COMMERCIAL

## 2019-10-29 VITALS
SYSTOLIC BLOOD PRESSURE: 132 MMHG | HEART RATE: 89 BPM | TEMPERATURE: 96.4 F | RESPIRATION RATE: 16 BRPM | DIASTOLIC BLOOD PRESSURE: 62 MMHG

## 2019-10-29 PROBLEM — L97.423 DIABETIC ULCER OF LEFT HEEL ASSOCIATED WITH TYPE 2 DIABETES MELLITUS, WITH NECROSIS OF MUSCLE (HCC): Status: ACTIVE | Noted: 2019-06-21

## 2019-10-29 PROBLEM — L08.9 TYPE 2 DIABETES MELLITUS WITH LEFT DIABETIC FOOT INFECTION (HCC): Status: ACTIVE | Noted: 2019-10-29

## 2019-10-29 PROBLEM — E11.628 TYPE 2 DIABETES MELLITUS WITH LEFT DIABETIC FOOT INFECTION (HCC): Status: ACTIVE | Noted: 2019-10-29

## 2019-10-29 PROCEDURE — 11043 DBRDMT MUSC&/FSCA 1ST 20/<: CPT

## 2019-10-29 PROCEDURE — 74011000250 HC RX REV CODE- 250: Performed by: PODIATRIST

## 2019-10-29 RX ADMIN — Medication: at 14:12

## 2019-10-29 NOTE — DISCHARGE INSTRUCTIONS
Clean wounds ith NS, apply HFBR, windowpane wounds with white foam. Cover with gauze, RG . Vickie Lemus Care to be done every other day. . Return to center in 1 week  Discharge Instructions for  [facility]  [address] [facility], [address] [facility]  Telephone: [phone]    NAME:  Mary Jane Mariee OF BIRTH:  1960  MEDICAL RECORD NUMBER:  [id number]  DATE:  10/29/2019    Wound Cleansing:   Do not scrub or use excessive force. Cleanse wound prior to applying a clean dressing with:  [xx ] Normal Saline [ ] Keep Wound Dry in Shower    [ ] Wound Cleanser   [ ] Cleanse wound with Mild Soap & Water  [ ] May Shower at Discharge   [ ] Other:          Dressings:           Wound Location  Right plantar foot. . Lateral..  Left plantar heel  [x ] Apply Primary Dressing:  HFBR       [ ] Pack wound loosely with  [ ] Iodoform   [ ] Enma Lenard  [ ] Other   [xx ] Cover and Secure with:     [xx Gauze [ ] Diana Cos [ ] Kerlix   [ ] Siva Cano [ ] Radha Vidales [xx ] ABD [ ] Exudry    [ ] Other: RG,    Avoid contact of tape with skin. [ ] Change dressing: [ ] Daily    [x ] Every Other Day [ ] Three times per week   [ ] Once a week [ ] Do Not Change Dressing   [ ] Other:  [ ]LeftArm    Off-Loading:     [ ] Total non-weight bearing  [ ] Right Leg  [ ] Left Leg   [ ] Assistive Device [ ] Mc Arce [ ] Michael Tomas  [ ] Wheelchair  [ ] Chris Rafter   [ ] Surgical shoe    [ ] Clarene Roberts Boot(s)   [ ] Foam Boot(s)  [ ] Dinorah Parcel About    [ ] Kathi Lights [ ] Prasanna Rater  [ ] Other:    Contact Cast:  Apply: [ ] Total Contact Cast Applied in Clinic [ ]RightLeg [ ]Left Leg   [ ] Do not get cast wet. Contact center or go to emergency room if there is a foul odor or becomes uncomfortable due to feeling tight or swelling. Do not use objects inside of cast to scratch.       Dietary:  [ ] Diet as tolerated: [x Diabetic Diet: Low carb no sugar [ ] No Added Salt:  [ ] Increase Protein: [ ] Other: Salt free, sugar free  Activity:  [ ] Activity as tolerated:  [ ] Patient has no activity restrictions     [ ] Strict Bedrest: [ ] Remain off Work:     [ ] May return to full duty work:                                   [ ] Return to work with restrictions:   Restrict ambulation. ..           Return Appointment:  1 week  MD did recut padding for shoes. .  [ ] Wound and dressing supply provider:   [ ] ECF or Home Healthcare:  [ ] Wound Assessment: [ ] Physician or NP scheduled for Wound Assessment:   [ ] Return Appointment: With MD  in  [facility])  [ ] Ordered tests:      Electronically signed Shelby Odonnell RN on 10/29/2019 at 2:52 PM     [address] [facility] Information: Should you experience any significant changes in your wound(s) or have questions about your wound care, please contact the [facility] Main at [address] [place] [address] 8:00 am - 4:30. If you need help with your wound outside these hours and cannot wait until we are again available, contact your PCP or go to the hospital emergency room. PLEASE NOTE: IF YOU ARE UNABLE TO OBTAIN WOUND SUPPLIES, CONTINUE TO USE THE SUPPLIES YOU HAVE AVAILABLE UNTIL YOU ARE ABLE TO REACH US. IT IS MOST IMPORTANT TO KEEP THE WOUND COVERED AT ALL TIMES.      Physician Signature:_______________________    Date: ___________ Time:  ____________

## 2019-10-29 NOTE — WOUND CARE
10/29/19 1427 Wound Foot Right;Plantar;Lateral 07/30/19 Date First Assessed/Time First Assessed: 07/30/19 1034   Present on Hospital Admission: Yes  Wound Approximate Age at First Assessment (Weeks): 1 weeks  Primary Wound Type: Diabetic Ulcer  Location: Foot  Wound Location Orientation: Right;Plantar;Late. .. Wound Procedure Type Debridement- Surgical  
Procedure Time Out 1821 Consent Obtained  Yes Procedure Bleeding Minimal  
Procedure Hemostasis  Pressure Type of Tissue Removed  Devitalized Procedure Instrument  Blade Debridement Procedure Performed by Dr Shweta Willis Procedure Tolerated Well Wound Foot Left;Plantar Date First Assessed/Time First Assessed: 06/21/19 0857   Present on Hospital Admission: Yes  Primary Wound Type: Diabetic  Location: Foot  Wound Location Orientation: Left;Plantar Wound Procedure Type Debridement- Surgical  
Procedure Time Out 9984 Consent Obtained  Yes Procedure Bleeding Moderate Procedure Hemostasis  Silver Nitrate Type of Tissue Removed  Devitalized Procedure Instrument  Blade;Curette Procedure Pain Scale Numeric 0/10 Debridement Procedure Performed by Dr Shweta Willis Post-Procedure Length (cm) 3 cm Post-Procedure Width (cm) 2.3 cm Post-Procedure Depth (cm) 0.7 cm Post-Procedure Volume (cm^3) 4.83 cm^3 Post-Procedure Surface Area (cm^2) 6.9 cm^2 Post Procedure Pain Scale Numeric 0/10 Procedure Tolerated Well

## 2019-10-29 NOTE — WOUND CARE
10/29/19 1401 Wound Foot Right;Plantar;Lateral 07/30/19 Date First Assessed/Time First Assessed: 07/30/19 1034   Present on Hospital Admission: Yes  Wound Approximate Age at First Assessment (Weeks): 1 weeks  Primary Wound Type: Diabetic Ulcer  Location: Foot  Wound Location Orientation: Right;Plantar;Late. .. Dressing Status Removed Dressing Type Gauze;Gauze wrap (arnie) Wound Length (cm) 1 cm Wound Width (cm) 1 cm Wound Depth (cm) 0.2 cm Wound Volume (cm^3) 0.2 cm^3 Condition of Base Pink Condition of Edges Calloused Undermining (cm) 0.2 cm Direction of Undermining 12 o'clock Drainage Amount Moderate Drainage Color Serosanguinous Wound Odor None Sandra-wound Assessment Maceration Cleansing and Cleansing Agents  Normal saline Wound Foot Left;Plantar Date First Assessed/Time First Assessed: 06/21/19 0857   Present on Hospital Admission: Yes  Primary Wound Type: Diabetic  Location: Foot  Wound Location Orientation: Left;Plantar Dressing Status Removed Dressing Type Gauze;Gauze wrap (arnie) Wound Length (cm) 2.5 cm Wound Width (cm) 2.2 cm Wound Depth (cm) 0.5 cm Wound Volume (cm^3) 2.75 cm^3 Condition of Base Pink;Slough Condition of Edges Calloused; Open Undermining (cm) 0.3 cm Direction of Undermining 2 o'clock 
(to 5 o clock) Drainage Amount Moderate Drainage Color Serosanguinous Wound Odor None Sandra-wound Assessment Maceration Cleansing and Cleansing Agents  Normal saline Wound Breast Left Date First Assessed/Time First Assessed: 06/07/19 0834   Location: Breast  Wound Location Orientation: Left Dressing Status Removed Dressing Type Gauze;Special tape (comment) Wound Length (cm) 0.7 cm Wound Width (cm) 0.9 cm Wound Depth (cm) 0.1 cm Wound Volume (cm^3) 0.06 cm^3 Condition of Base Epithelializing;Pink Condition of Edges Open Drainage Amount Scant Drainage Color Serous Wound Odor None Sandra-wound Assessment Intact Cleansing and Cleansing Agents  Normal saline Dressing Changed Changed/New Dressing Type Applied Xeroform; Foam  
 
Visit Vitals /62 (BP 1 Location: Left arm, BP Patient Position: At rest) Pulse 89 Temp 96.4 °F (35.8 °C) Resp 16

## 2019-10-29 NOTE — PROGRESS NOTES
Patient presents to wound clinic for: Veronique Lyman is a 60 y.o. female who presents for wound care of bilateral plantar foot ulcers. Patient currently being treated by Dr. Camila Marquez for a wound of the left breast, and was referred to me for further offloading and wound care recommendations of the bilateral heel ulcers. The ulcer of the plantar left heel occurred first and patient was placed in a heel offloading shoe. She then subsequently developed an ulceration of the plantar 5th met base of the right foot. She has been wearing her sneakers with her offloading pads. 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 Denies f/c/n/v/d. No new complaints at the time. Notes that she has an appointment for diabetic shoes at beginning of November. Last week when she saw Dr. Camila Marquez, there was noted deterioration of the left foot wound with increased drainage. Patient was started on clindamycin. Denies any issues with abx. Denies f/c/n/v/d. Denies any increased pain to foot. Pertinent Medical History: 
Past Medical History:  
Diagnosis Date  Acquired lymphedema Right arm  Arrhythmia  Asthma  Atrial fibrillation ([facility] Dr. Diane Fulton and Dr. George [facility])  Cancer ([facility] bilateral breast  
 Chronic kidney disease  Diabetes mellitus type II   
 Dizziness  Essential hypertension  Hyperlipidemia  Hypertension  Long term (current) use of anticoagulants  Morbid obesity ([facility] 3/14/2012  Osteoarthritis  Pacemaker  Sick sinus syndrome ([facility] Past Surgical History:  
Procedure Laterality Date  ABDOMEN SURGERY PROC UNLISTED    
 gastric bypass  GASTRIC BYPASS,OBESE<150CM SAW-EN-Y  7/08  
 guanakito  HX AFIB ABLATION    
 HX CARPAL TUNNEL RELEASE [address] [place] [id number] RIGHT MODIFIED RADICAL   
 HX MOHS PROCEDURES  1995  
 right  HX ORTHOPAEDIC    
 ight knee surgery  HX PACEMAKER    
 IR INSERT TUNL CVC W PORT OVER 5 YEARS    
 LAMINECTOMY,CERVICAL  1994  
 NEEDLE BIOPSY LIVER,W OTHR [address]  8.21.07  
 RI [address] AND 1994  RI TRABECULOPLASTY BY LASER SURGERY    
 US GUIDE ASP OVARIAN CYST ABD APPROACH  8.21.07  
 RIGHT Prior to Admission medications Medication Sig Start Date End Date Taking? Authorizing Provider  
hydrALAZINE (APRESOLINE) 100 mg tablet TAKE ONE TABLET BY MOUTH THREE TIMES A DAY 10/21/19   Jayda Ramon MD  
clindamycin (CLEOCIN) 300 mg capsule Take 1 Cap by mouth three (3) times daily. 9/27/19   Jayda Ramon MD  
sertraline (ZOLOFT) 50 mg tablet TAKE ONE AND ONE-HALF (1 & 1/2) TABLET BY MOUTH DAILY 8/22/19   Jayda Ramon MD  
warfarin (COUMADIN) 4 mg tablet Take 1 tablet on Monday a half tablet the other 6 days. 8/15/19   Jayda Ramon MD  
bumetanide (BUMEX) 1 mg tablet Take 2 mg by mouth daily. Provider, Historical  
cloNIDine HCl (CATAPRES) 0.3 mg tablet Take 0.6 mg by mouth nightly. Provider, Historical  
metoprolol succinate (TOPROL-XL) 100 mg tablet TAKE TWO TABLETS BY MOUTH DAILY 7/12/19   Jayda Ramon MD  
doxazosin (CARDURA) 4 mg tablet TAKE ONE TABLET BY MOUTH ONCE NIGHTLY 6/14/19   Jayda Ramon MD  
busPIRone (BUSPAR) 10 mg tablet TAKE ONE TABLET BY MOUTH TWICE A DAY 5/24/19   Jayda Ramon MD  
potassium chloride SR (KLOR-CON 10) 10 mEq tablet TAKE ONE TABLET BY MOUTH DAILY 4/22/19   Jame Leon MD  
insulin glargine (LANTUS U-100 INSULIN) 100 unit/mL injection 20 Units by SubCUTAneous route daily. 11/13/18   Jayda Ramon MD  
metolazone (ZAROXOLYN) 5 mg tablet Take 1 Tab by mouth daily as needed (take if develop increased LE edema, weight gain > 5 pounds. ). 4/10/14   Alexandru Ruelas NP  
cholecalciferol, VITAMIN D3, (VITAMIN D3) 5,000 unit tab tablet Take 5,000 Units by mouth daily. Provider, Historical  
vitamin A 8,000 unit capsule Take 8,000 Units by mouth daily. Provider, Historical  
insulin lispro (HUMALOG) 100 unit/mL kwikpen 2 units with dinner for sugars over 200 1/3/14   Caroline Terry MD  
cyanocobalamin (VITAMIN B-12) 1,000 mcg sublingual tablet Take 1,000 mcg by mouth daily. Provider, Historical  
 
Allergies Allergen Reactions  Ace Inhibitors Cough  Amlodipine Swelling  Avapro [Irbesartan] Unable to Obtain  Bactrim [Sulfamethoxazole-Trimethoprim] Other (comments) Sore mouth  Captopril Cough  Carvedilol Diarrhea  Contrast Agent [Iodine] Itching [address]  Morphine Nausea and Vomiting and Swelling  Penicillins Hives  Pravachol [Pravastatin] Nausea Only  Seafood [Shellfish Containing Products] Hives  Singulair [Montelukast] Other (comments)  
  palpitations Family History Problem Relation Age of Onset  Cancer Mother  Heart Disease Mother  Alcohol abuse Father  Diabetes Brother  Hypertension Brother Social History Socioeconomic History  Marital status:  Spouse name: Not on file  Number of children: Not on file  Years of education: Not on file  Highest education level: Not on file Occupational History  Not on file Social Needs  Financial resource strain: Not on file  Food insecurity:  
  Worry: Not on file Inability: Not on file  Transportation needs:  
  Medical: Not on file Non-medical: Not on file Tobacco Use  Smoking status: Never Smoker  Smokeless tobacco: Never Used Substance and Sexual Activity  Alcohol use: Yes Comment: rare  Drug use: No  
 Sexual activity: Not on file Lifestyle  Physical activity:  
  Days per week: Not on file Minutes per session: Not on file  Stress: Not on file Relationships  Social connections:  
  Talks on phone: Not on file Gets together: Not on file Attends Denominational service: Not on file Active member of club or organization: Not on file Attends meetings of clubs or organizations: Not on file Relationship status: Not on file  Intimate partner violence:  
  Fear of current or ex partner: Not on file Emotionally abused: Not on file Physically abused: Not on file Forced sexual activity: Not on file Other Topics Concern  Not on file Social History Narrative  Not on file Vitals:  
 10/29/19 1358 BP: 132/62 Pulse: 89 Resp: 16 Temp: 96.4 °F (35.8 °C) Review of Systems: 
 
Gen: No fever, chills, malaise, weight loss/gain. Heent: No headache, rhinorrhea, epistaxis, ear pain, hearing loss, sinus pain, neck pain/stiffness, sore throat. Heart: No chest pain, palpitations, CHOW, pnd, or orthopnea. Resp: No cough, hemoptysis, wheezing and shortness of breath. GI: No nausea, vomiting, diarrhea, constipation, melena or hematochezia. : No urinary obstruction, dysuria or hematuria. Derm: see below Musc/skeletal: no bone or joint complains. Vasc: No edema, cyanosis or claudication. Endo: No heat/cold intolerance, no polyuria,polydipsia or polyphagia. Neuro: No unilateral weakness, numbness, tingling. No seizures. Heme: No easy bruising or bleeding. Wound Description: 
Refer to nursing notes for measurements. Ulcer Debridement Left plantar foot wound Character of Ulcer: Larger Indication for Debridement: Slough, Exudate, Abnormal wound edge and Abnormal Wound base Pre debridement measurements: 2.5x2. 2x0.5 cm Risks of procedure were discussed with patient. Consent has been signed. Anesthetic: Lidocaine 4% topical cream  
 
Level of Debridement: Subctaneous Tissue , muscule Tissue and/or Material removed: Exudate, Fibrin/ Slough and Subcutaneous, muscle Type of Tissue: Non- Viable Pre-debridement severity: Fat Layer Exposed Post debridement severity: Fat Layer exposed Instrument(s) used: Scalpel Bleeding controlled with: Pressure Estimated blood loss: Minimal 
 
Pre debridement measurements: 2.6x2. 3x0.6 cm Post procedural pain: mild Patient tolerated procedure well. Right  plantar foot wound Character of Ulcer: smaller Indication for Debridement: Slough, Exudate, Abnormal wound edge and Abnormal Wound base Pre debridement measurements: 1 cm x 1 cm x 0.2 cm Risks of procedure were discussed with patient. Consent has been signed. Anesthetic: Lidocaine 4% topical cream  
 
Level of Debridement: Subctaneous Tissue Tissue and/or Material removed: Exudate, Fibrin/ Slough and Subcutaneous Type of Tissue: Non- Viable Pre-debridement severity: Fat Layer Exposed Post debridement severity: Fat Layer exposed Instrument(s) used: Scalpel Bleeding controlled with: Pressure Estimated blood loss: Minimal 
 
Postdebridement measurements: 1.1 cm x 1.1 cm x 0.3 cm Post procedural pain: mild Patient tolerated procedure well. Infection: yes to left foot Edema: mild edema Lower Extremity Circulation Reviewed patient's recent ABIs. Shows decrease from when the  
 
Offloading: Offloading padding to shoes Assessment: 1. Diabetic plantar heel ulcer left foot with associated infection 2. Diabetic ulcer right foot-plantar 5th met base 3. Diabetic peripheral neuropathy 4. B/l cavus foot deformity Plan:  
-Patient seen and evaluated as noted above. 
-Wound debridement performed. 
-Patients left heel culture results show MSSA, strep group G, and psedomonas. Patient to continue on clinda. Pt given script for ciprofloxacin for pseudomonas coverage. 
-Obtain x-rays of left foot as wound is now down to muscle. 
-Discussed with patient that right foot wound is stable  
-Endoform, hydrofera blue gauze dressing to both feet while we wait for infection to resolve -Discussed with patient that while it is not ideal, we may consider a Vac to the left heel as it is now down to muscle. 
-New offloading paddingmade for  both shoes to allow equal pressure as previous pads were significantly worn down. 
-Patient currently has appointment next to get fitted for diabetic shoes from when the wounds were more superficial. Appointment may be delayed as wounds are deeper. 
-Follow-up in 1 week

## 2019-10-29 NOTE — WOUND CARE
10/29/19 1401 Wound Foot Right;Plantar;Lateral 07/30/19 Date First Assessed/Time First Assessed: 07/30/19 1034   Present on Hospital Admission: Yes  Wound Approximate Age at First Assessment (Weeks): 1 weeks  Primary Wound Type: Diabetic Ulcer  Location: Foot  Wound Location Orientation: Right;Plantar;Late. .. Dressing Status Removed Dressing Type Gauze;Gauze wrap (arnie) Wound Length (cm) 1 cm Wound Width (cm) 1 cm Wound Depth (cm) 0.2 cm Wound Volume (cm^3) 0.2 cm^3 Condition of Base Pink Condition of Edges Calloused Undermining (cm) 0.2 cm Direction of Undermining 12 o'clock Drainage Amount Moderate Drainage Color Serosanguinous Wound Odor None Sandra-wound Assessment Maceration Cleansing and Cleansing Agents  Normal saline Dressing Changed Changed/New Dressing Type Applied Gauze;Gauze wrap (arnie) (HFBR; windowpaned with white foam) Wound Foot Left;Plantar Date First Assessed/Time First Assessed: 06/21/19 0857   Present on Hospital Admission: Yes  Primary Wound Type: Diabetic  Location: Foot  Wound Location Orientation: Left;Plantar Dressing Status Removed Dressing Type Gauze;Gauze wrap (arnie) Wound Length (cm) 2.5 cm Wound Width (cm) 2.2 cm Wound Depth (cm) 0.5 cm Wound Volume (cm^3) 2.75 cm^3 Condition of Base Pink;Slough Condition of Edges Calloused; Open Undermining (cm) 0.3 cm Direction of Undermining 2 o'clock 
(to 5 o clock) Drainage Amount Moderate Drainage Color Serosanguinous Wound Odor None Sandra-wound Assessment Maceration Cleansing and Cleansing Agents  Normal saline Dressing Changed Changed/New Dressing Type Applied Gauze;Gauze wrap (arnie) (HFBR; windowpaned with white foam) Wound Breast Left Date First Assessed/Time First Assessed: 06/07/19 0834   Location: Breast  Wound Location Orientation: Left Dressing Status Removed Dressing Type Gauze;Special tape (comment) Wound Length (cm) 0.7 cm  
 Wound Width (cm) 0.9 cm Wound Depth (cm) 0.1 cm Wound Volume (cm^3) 0.06 cm^3 Condition of Base Epithelializing;Pink Condition of Edges Open Drainage Amount Scant Drainage Color Serous Wound Odor None Sandra-wound Assessment Intact Cleansing and Cleansing Agents  Normal saline Dressing Changed Changed/New Dressing Type Applied Xeroform; Foam  
Discharge Condition: Stable Pain: 3 Ambulatory Status: Walking Discharge Destination: Home Transportation: Car Accompanied by: Self Discharge instructions reviewed with Patient and copy or written instructions have been provided. All questions/concerns have been addressed at this time.

## 2019-10-31 ENCOUNTER — TELEPHONE (OUTPATIENT)
Dept: INTERNAL MEDICINE CLINIC | Facility: CLINIC | Age: 59
End: 2019-10-31

## 2019-10-31 NOTE — TELEPHONE ENCOUNTER
Pt is calling because the wound care doctor put the pt on an antibiotic. When she went to get it filled the pharmacist told her that it will react to the warfarin that she is currently taking. Pt would like to speak to the nurse to figure out if she should stop taking the warfarin while taking this medication or what she should do.

## 2019-10-31 NOTE — TELEPHONE ENCOUNTER
All antibiotics can affect prothrombin time by making it rise. DO NOT STOP warfarin She was already taking clindamycin. Take both antibiotics and check protime/INR today or tomorrow if possible as POC test. .

## 2019-11-01 ENCOUNTER — CLINICAL SUPPORT (OUTPATIENT)
Dept: INTERNAL MEDICINE CLINIC | Facility: CLINIC | Age: 59
End: 2019-11-01

## 2019-11-01 ENCOUNTER — HOSPITAL ENCOUNTER (OUTPATIENT)
Dept: GENERAL RADIOLOGY | Age: 59
Discharge: HOME | End: 2019-11-01
Payer: COMMERCIAL

## 2019-11-01 VITALS
HEIGHT: 69 IN | RESPIRATION RATE: 12 BRPM | OXYGEN SATURATION: 98 % | DIASTOLIC BLOOD PRESSURE: 76 MMHG | SYSTOLIC BLOOD PRESSURE: 176 MMHG | HEART RATE: 84 BPM | TEMPERATURE: 97.9 F | BODY MASS INDEX: 37.51 KG/M2

## 2019-11-01 DIAGNOSIS — M86.9 DIABETIC FOOT ULCER WITH OSTEOMYELITIS (HCC): ICD-10-CM

## 2019-11-01 DIAGNOSIS — I48.0 PAF (PAROXYSMAL ATRIAL FIBRILLATION) (HCC): Primary | ICD-10-CM

## 2019-11-01 DIAGNOSIS — E11.69 DIABETIC FOOT ULCER WITH OSTEOMYELITIS (HCC): ICD-10-CM

## 2019-11-01 DIAGNOSIS — E11.621 DIABETIC FOOT ULCER WITH OSTEOMYELITIS (HCC): ICD-10-CM

## 2019-11-01 DIAGNOSIS — L97.509 DIABETIC FOOT ULCER WITH OSTEOMYELITIS (HCC): ICD-10-CM

## 2019-11-01 LAB
INR BLD: 2.2
PT POC: 26.2 SECONDS
VALID INTERNAL CONTROL?: YES

## 2019-11-01 PROCEDURE — 73630 X-RAY EXAM OF FOOT: CPT

## 2019-11-01 NOTE — PROGRESS NOTES
Eva Honeycutt is a 60 y.o. female who presents today for Anticoagulation monitoring. Indication: Atrial Fibrillation  INR Goal: 2.0-3.0. Current dose:  Coumadin 4mg 1 tablet on Monday and .5 all other days. Missed Coumadin Doses: This week missed half tablet yesterday  Medication Changes:  yes - antibiotic added on Wednesday  Dietary Changes:  no    Symptoms: taking coumadin appropriately without any bleeding. Latest INRs:  Lab Results   Component Value Date/Time    INR 2.2 (H) 09/25/2019 10:40 AM    INR 2.7 (H) 08/29/2019 03:12 PM    INR 2.5 (H) 08/11/2019 05:08 AM    INR POC 2.2 11/01/2019 02:21 PM    INR POC 3.7 08/15/2019 02:26 PM    Prothrombin time 21.3 (H) 09/25/2019 10:40 AM    Prothrombin time 26.1 (H) 08/29/2019 03:12 PM    Prothrombin time 24.7 (H) 08/11/2019 05:08 AM        New Coumadin dose:.current treatment plan is effective, no change in therapy. Next check to be scheduled for  4 weeks. Has not taken all BP meds for today, she will check BP later today and let us know if does not go down.

## 2019-11-05 ENCOUNTER — HOSPITAL ENCOUNTER (OUTPATIENT)
Dept: WOUND CARE | Age: 59
Discharge: HOME | End: 2019-11-05
Payer: COMMERCIAL

## 2019-11-05 VITALS
SYSTOLIC BLOOD PRESSURE: 154 MMHG | DIASTOLIC BLOOD PRESSURE: 67 MMHG | HEART RATE: 63 BPM | TEMPERATURE: 96.1 F | RESPIRATION RATE: 16 BRPM

## 2019-11-05 DIAGNOSIS — L03.115 CELLULITIS OF RIGHT LOWER EXTREMITY: ICD-10-CM

## 2019-11-05 PROCEDURE — 74011000250 HC RX REV CODE- 250: Performed by: PODIATRIST

## 2019-11-05 PROCEDURE — 11042 DBRDMT SUBQ TIS 1ST 20SQCM/<: CPT

## 2019-11-05 RX ADMIN — Medication: at 14:32

## 2019-11-05 NOTE — DISCHARGE INSTRUCTIONS
Clean wound with NS, apply endoform, HFBR, gauze RG tape. . Care to be done every other day    Discharge Instructions for  [address] [id number], [address] [place] [address]  Telephone: [phone]    NAME:  Milton Boland OF BIRTH:  1960  MEDICAL RECORD NUMBER:  [id number]  DATE:  11/5/2019    Wound Cleansing:   Do not scrub or use excessive force.   Cleanse wound prior to applying a clean dressing with:  [xx ] Normal Saline [ ] Keep Wound Dry in Shower    [ ] Vciki Gull   [ ] Cleanse wound with Mild Soap & Water  [ ] May Shower at Discharge   [ ] Other:             Dressings:           Wound Location bilat heels     x ] Apply Primary Dressing:  Endoform, HFBR windowpaned with white foam, gauze RG tape     [ ] MediHoney Gel [ ] Alginate with Silver [ ] Alginate   [ ] Collagen [ ] Collagen with Silver   [ ] Santyl with Moisten saline gauze     [ ] Hydrocolloid   [ ] Debria Logan Alginate [ ] Foam with Silver   [ ] Foam   [ ] Suzanna Devon    [ ] Marionette Bloch    [ ] Moisten with Saline [ ] Hydrogel [ ] Marydillonan Antonino     [ ] Bactroban/Mupirocin [ ] Irma Graves  [ ] Other:    [ ] Change dressing: [ ] Daily    [xx ] Every Other Day [ ] Three times per week   [ ] Once a week [ ] Gayatri Espinal Not Change Dressing   [ ] Other:           Dietary:  [xx ] Diet as tolerated: [ ] Calorie Diabetic Diet: [ ] No Added Salt:  [ ] Increase Protein: [ ] Other:   Activity:  [ ] Activity as tolerated:  [ ] Patient has no activity restrictions     [ ] Strict Bedrest: [ ] Remain off Work:     [ ] May return to full duty work:                                   [ ] Return to work with restrictions:             Return Appointment:  [ ] Wound and dressing supply provider:   [ ] ECF or Home Healthcare:  [ ] Wound Assessment: [ ] Physician or NP scheduled for Wound Assessment:   [ x] Return Appointment: With MD  in  [facility])  [ ] Ordered tests:      Electronically signed Mario Vogt RN on 11/5/2019 at 3420 [address] Information: Should you experience any significant changes in your wound(s) or have questions about your wound care, please contact the [facility] Main at [address] [place] [address] 8:00 am - 4:30. If you need help with your wound outside these hours and cannot wait until we are again available, contact your PCP or go to the hospital emergency room. PLEASE NOTE: IF YOU ARE UNABLE TO OBTAIN WOUND SUPPLIES, CONTINUE TO USE THE SUPPLIES YOU HAVE AVAILABLE UNTIL YOU ARE ABLE TO REACH US. IT IS MOST IMPORTANT TO KEEP THE WOUND COVERED AT ALL TIMES.      Physician Signature:_______________________    Date: ___________ Time:  ____________

## 2019-11-05 NOTE — WOUND CARE
11/05/19 1458 Wound Foot Right;Plantar;Lateral 07/30/19 Date First Assessed/Time First Assessed: 07/30/19 1034   Present on Hospital Admission: Yes  Wound Approximate Age at First Assessment (Weeks): 1 weeks  Primary Wound Type: Diabetic Ulcer  Location: Foot  Wound Location Orientation: Right;Plantar;Late. .. Wound Length (cm) 2 cm Wound Width (cm) 0.8 cm Wound Depth (cm) 0.1 cm Wound Surface Area (cm^2) 1.6 cm^2 Wound Volume (cm^3) 0.16 cm^3 Condition of Base Pink Condition of Edges Calloused Dressing Changed Changed/New Dressing Type Applied Foam;Gauze;Gauze wrap (arnie) 
(Endoform; HFBR, windowpane with white foam) Wound Procedure Type  
(debridment) Procedure Time Out 4158 Consent Obtained  Yes Procedure Bleeding Minimal  
Procedure Hemostasis  Silver Nitrate Type of Tissue Removed  Devitalized Procedure Instrument  Curette (blade) Debridement Procedure Performed by Dr Beata Ndiaye Post-Procedure Length (cm) 0.9 cm Post-Procedure Width (cm) 1.7 cm Post-Procedure Depth (cm) 0.2 cm Post-Procedure Volume (cm^3) 0.31 cm^3 Post-Procedure Surface Area (cm^2) 1.53 cm^2 Procedure Tolerated Well Wound Foot Left;Plantar Date First Assessed/Time First Assessed: 06/21/19 0857   Present on Hospital Admission: Yes  Primary Wound Type: Diabetic  Location: Foot  Wound Location Orientation: Left;Plantar Dressing Changed Changed/New Dressing Type Applied Gauze;Gauze wrap (arnie) 
(endoform;HFBR; windowpane with white foam) Wound Procedure Type Debridement- Surgical  
Procedure Time Out 0972 Consent Obtained  Yes Procedure Bleeding Moderate Procedure Hemostasis  Silver Nitrate Type of Tissue Removed  Devitalized Procedure Instrument  Blade;Curette Procedure Pain Scale Numeric 0/10 Debridement Procedure Performed by Dr Beata Ndiaye Post-Procedure Length (cm) 2.5 cm Post-Procedure Width (cm) 2.5 cm Post-Procedure Depth (cm) 0.6 cm  
 Post-Procedure Volume (cm^3) 3.75 cm^3 Post-Procedure Surface Area (cm^2) 6.25 cm^2 Post Procedure Pain Scale Numeric 0/10 Procedure Tolerated Well Discharge Condition: Stable Pain: 2 Ambulatory Status: Walking Discharge Destination: Home Transportation: Car Accompanied by: Self Discharge instructions reviewed with Patient and copy or written instructions have been provided. All questions/concerns have been addressed at this time.

## 2019-11-05 NOTE — PROGRESS NOTES
Patient presents to wound clinic for: Veronique Lyman is a 60 y.o. female who presents for wound care of bilateral plantar foot ulcers. Patient currently being treated by Dr. Camila Marquez for a wound of the left breast, and was referred to me for further offloading and wound care recommendations of the bilateral heel ulcers. The ulcer of the plantar left heel occurred first and patient was placed in a heel offloading shoe. She then subsequently developed an ulceration of the plantar 5th met base of the right foot. She has been wearing her sneakers with her offloading pads. 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 Denies f/c/n/v/d. No new complaints at the time. Notes that she has an appointment for diabetic shoes later this week. Notes that the new padding in her shoes has helped. Has been taking the clindamycin and ciprofloxacin for the diabetic foot infection. Denies any issues with abx. Denies f/c/n/v/d. Denies any increased pain to foot. Pertinent Medical History: 
Past Medical History:  
Diagnosis Date  Acquired lymphedema Right arm  Arrhythmia  Asthma  Atrial fibrillation ([facility] [address] Dr. Diane Fulton and Dr. George [facility])  Cancer ([facility] [address] bilateral breast  
 Chronic kidney disease  Diabetes mellitus type II   
 Dizziness  Essential hypertension  Hyperlipidemia  Hypertension  Long term (current) use of anticoagulants  Morbid obesity ([facility] [address] 3/14/2012  Osteoarthritis  Pacemaker  Sick sinus syndrome ([facility] [address] Past Surgical History:  
Procedure Laterality Date  ABDOMEN SURGERY PROC UNLISTED    
 gastric bypass  GASTRIC BYPASS,OBESE<150CM SAW-EN-Y  7/08  
 kaitlincher  HX AFIB ABLATION    
 HX CARPAL TUNNEL RELEASE [address] RIGHT MODIFIED RADICAL   
 HX MOHS PROCEDURES  1995  
 right  HX ORTHOPAEDIC    
 ight knee surgery  HX PACEMAKER    
 IR INSERT TUNL CVC W PORT OVER 5 YEARS    
 LAMINECTOMY,CERVICAL  1994  
 NEEDLE BIOPSY LIVER,W OTHR [address]  8.21.07  
 [place] [address] AND 1994  [place] TRABECULOPLASTY BY LASER SURGERY    
 US GUIDE ASP OVARIAN CYST ABD APPROACH  8.21.07  
 RIGHT Prior to Admission medications Medication Sig Start Date End Date Taking? Authorizing Provider  
hydrALAZINE (APRESOLINE) 100 mg tablet TAKE ONE TABLET BY MOUTH THREE TIMES A DAY 10/21/19   Stacie Preciado MD  
clindamycin (CLEOCIN) 300 mg capsule Take 1 Cap by mouth three (3) times daily. 9/27/19   Stacie Preciado MD  
sertraline (ZOLOFT) 50 mg tablet TAKE ONE AND ONE-HALF (1 & 1/2) TABLET BY MOUTH DAILY 8/22/19   Stacie Preciado MD  
warfarin (COUMADIN) 4 mg tablet Take 1 tablet on Monday a half tablet the other 6 days. 8/15/19   Stacie Preciado MD  
bumetanide (BUMEX) 1 mg tablet Take 2 mg by mouth daily. Provider, Historical  
cloNIDine HCl (CATAPRES) 0.3 mg tablet Take 0.6 mg by mouth nightly. Provider, Historical  
metoprolol succinate (TOPROL-XL) 100 mg tablet TAKE TWO TABLETS BY MOUTH DAILY 7/12/19   Stacie Preciado MD  
doxazosin (CARDURA) 4 mg tablet TAKE ONE TABLET BY MOUTH ONCE NIGHTLY 6/14/19   Stacie Preciado MD  
busPIRone (BUSPAR) 10 mg tablet TAKE ONE TABLET BY MOUTH TWICE A DAY 5/24/19   Stacie Preciado MD  
potassium chloride SR (KLOR-CON 10) 10 mEq tablet TAKE ONE TABLET BY MOUTH DAILY 4/22/19   Cathy Diez MD  
insulin glargine (LANTUS U-100 INSULIN) 100 unit/mL injection 20 Units by SubCUTAneous route daily. 11/13/18   Stacie Preciado MD  
metolazone (ZAROXOLYN) 5 mg tablet Take 1 Tab by mouth daily as needed (take if develop increased LE edema, weight gain > 5 pounds. ). 4/10/14   Marty Valdez NP  
cholecalciferol, VITAMIN D3, (VITAMIN D3) 5,000 unit tab tablet Take 5,000 Units by mouth daily.     Provider, Historical  
 vitamin A 8,000 unit capsule Take 8,000 Units by mouth daily. Provider, Historical  
insulin lispro (HUMALOG) 100 unit/mL kwikpen 2 units with dinner for sugars over 200 1/3/14   Emil Perez MD  
cyanocobalamin (VITAMIN B-12) 1,000 mcg sublingual tablet Take 1,000 mcg by mouth daily. Provider, Historical  
 
Allergies Allergen Reactions  Ace Inhibitors Cough  Amlodipine Swelling  Avapro [Irbesartan] Unable to Obtain  Bactrim [Sulfamethoxazole-Trimethoprim] Other (comments) Sore mouth  Captopril Cough  Carvedilol Diarrhea  Contrast Agent [Iodine] Itching [address]  Morphine Nausea and Vomiting and Swelling  Penicillins Hives  Pravachol [Pravastatin] Nausea Only  Seafood [Shellfish Containing Products] Hives  Singulair [Montelukast] Other (comments)  
  palpitations Family History Problem Relation Age of Onset  Cancer Mother  Heart Disease Mother  Alcohol abuse Father  Diabetes Brother  Hypertension Brother Social History Socioeconomic History  Marital status:  Spouse name: Not on file  Number of children: Not on file  Years of education: Not on file  Highest education level: Not on file Occupational History  Not on file Social Needs  Financial resource strain: Not on file  Food insecurity:  
  Worry: Not on file Inability: Not on file  Transportation needs:  
  Medical: Not on file Non-medical: Not on file Tobacco Use  Smoking status: Never Smoker  Smokeless tobacco: Never Used Substance and Sexual Activity  Alcohol use: Yes Comment: rare  Drug use: No  
 Sexual activity: Not on file Lifestyle  Physical activity:  
  Days per week: Not on file Minutes per session: Not on file  Stress: Not on file Relationships  Social connections:  
  Talks on phone: Not on file Gets together: Not on file Attends Protestant service: Not on file Active member of club or organization: Not on file Attends meetings of clubs or organizations: Not on file Relationship status: Not on file  Intimate partner violence:  
  Fear of current or ex partner: Not on file Emotionally abused: Not on file Physically abused: Not on file Forced sexual activity: Not on file Other Topics Concern  Not on file Social History Narrative  Not on file Vitals:  
 11/05/19 1413 BP: 154/67 Pulse: 63 Resp: 16 Temp: 96.1 °F (35.6 °C) Review of Systems: 
 
Gen: No fever, chills, malaise, weight loss/gain. Heent: No headache, rhinorrhea, epistaxis, ear pain, hearing loss, sinus pain, neck pain/stiffness, sore throat. Heart: No chest pain, palpitations, CHOW, pnd, or orthopnea. Resp: No cough, hemoptysis, wheezing and shortness of breath. GI: No nausea, vomiting, diarrhea, constipation, melena or hematochezia. : No urinary obstruction, dysuria or hematuria. Derm: see below Musc/skeletal: no bone or joint complains. Vasc: No edema, cyanosis or claudication. Endo: No heat/cold intolerance, no polyuria,polydipsia or polyphagia. Neuro: No unilateral weakness, numbness, tingling. No seizures. Heme: No easy bruising or bleeding. Wound Description: 
Refer to nursing notes for measurements. The only addendum that I have is that the right foot plantar wound actually measures 2x0.8x0.1 cm. Patient's prior infection has resolved. Significantly decreased slough to the wounds. Ulcer Debridement Left plantar foot wound Character of Ulcer: stable Indication for Debridement: Slough, Exudate, Abnormal wound edge and Abnormal Wound base Pre debridement measurements: 2.5x2. 2x0.5 cm Risks of procedure were discussed with patient. Consent has been signed. Anesthetic: Lidocaine 4% topical cream  
 
Level of Debridement: Subctaneous Tissue , muscule Tissue and/or Material removed: Exudate, Fibrin/ Slough and Subcutaneous, Type of Tissue: Non- Viable Pre-debridement severity: Fat Layer Exposed Post debridement severity: Fat Layer exposed Instrument(s) used: Scalpel Bleeding controlled with: Pressure Estimated blood loss: Minimal 
 
Pre debridement measurements: 2.6x2. 3x0.6 cm Post procedural pain: mild Patient tolerated procedure well. Right  plantar foot wound Character of Ulcer: smaller Indication for Debridement: Slough, Exudate, Abnormal wound edge and Abnormal Wound base Pre debridement measurements: 2 cm x 1 cm x 0.2 cm Risks of procedure were discussed with patient. Consent has been signed. Anesthetic: Lidocaine 4% topical cream  
 
Level of Debridement: Subctaneous Tissue Tissue and/or Material removed: Exudate, Fibrin/ Slough and Subcutaneous Type of Tissue: Non- Viable Pre-debridement severity: Fat Layer Exposed Post debridement severity: Fat Layer exposed Instrument(s) used: Scalpel Bleeding controlled with: Pressure Estimated blood loss: Minimal 
 
Postdebridement measurements: 2.1 cm x 1.1 cm x 0.3 cm Post procedural pain: mild Patient tolerated procedure well. Infection: yes to left foot Edema: mild edema Lower Extremity Circulation Reviewed patient's recent ABIs. Shows decrease from when the  
 
Offloading: Offloading padding to shoes Assessment: 1. Diabetic plantar heel ulcer left foot with associated infection 2. Diabetic ulcer right foot-plantar 5th met base 3. Diabetic peripheral neuropathy 4. B/l cavus foot deformity Plan:  
-Patient seen and evaluated as noted above. 
-Wound debridement performed. 
-Infection has resolved 
-No new findings noted on x-ray. 
-Discussed with patient that both foot wounds are stable  
-Endoform, hydrofera blue gauze dressing to both feet while we wait for infection to resolve -Discussed with patient that while it is not ideal, we may consider a Vac to the left heel if it does not continue to improve 
-New offloading padding placed in shoes is holding up 
-Patient currently has appointment next to get fitted for diabetic shoes 
-Follow-up in 1 week

## 2019-11-05 NOTE — WOUND CARE
11/05/19 1416 Wound Foot Right;Plantar;Lateral 07/30/19 Date First Assessed/Time First Assessed: 07/30/19 1034   Present on Hospital Admission: Yes  Wound Approximate Age at First Assessment (Weeks): 1 weeks  Primary Wound Type: Diabetic Ulcer  Location: Foot  Wound Location Orientation: Right;Plantar;Late. .. Dressing Status Removed Dressing Type Gauze;Gauze wrap (arnie) Wound Length (cm) 3 cm Wound Width (cm) 1.3 cm Wound Depth (cm) 0.2 cm Wound Surface Area (cm^2) 3.9 cm^2 Wound Volume (cm^3) 0.78 cm^3 Condition of Base Pink Condition of Edges Calloused Drainage Amount Moderate Drainage Color Serosanguinous Wound Odor None Sandra-wound Assessment Maceration Cleansing and Cleansing Agents  Normal saline Wound Foot Left;Plantar Date First Assessed/Time First Assessed: 06/21/19 0857   Present on Hospital Admission: Yes  Primary Wound Type: Diabetic  Location: Foot  Wound Location Orientation: Left;Plantar Dressing Status Removed Dressing Type Gauze;Gauze wrap (arnie) Wound Length (cm) 2.5 cm Wound Width (cm) 2.2 cm Wound Depth (cm) 0.5 cm Wound Surface Area (cm^2) 5.5 cm^2 Wound Volume (cm^3) 2.75 cm^3 Condition of Base Pink;Slough Condition of Edges Calloused Drainage Amount Moderate Drainage Color Serosanguinous Wound Odor None Sandra-wound Assessment Maceration Cleansing and Cleansing Agents  Normal saline Wound Breast Left Date First Assessed/Time First Assessed: 06/07/19 0834   Location: Breast  Wound Location Orientation: Left Dressing Status Removed Dressing Type Adhesive wound dressing (Band-Aid); Xeroform Drainage Amount Scant Drainage Color Serous Wound Odor None Sandra-wound Assessment Intact Cleansing and Cleansing Agents  Normal saline Dressing Changed Changed/New Dressing Type Applied Xeroform;2 x 2 (tape)

## 2019-11-11 DIAGNOSIS — E11.22 CONTROLLED TYPE 2 DIABETES MELLITUS WITH STAGE 4 CHRONIC KIDNEY DISEASE, WITH LONG-TERM CURRENT USE OF INSULIN (HCC): ICD-10-CM

## 2019-11-11 DIAGNOSIS — Z79.4 CONTROLLED TYPE 2 DIABETES MELLITUS WITH STAGE 4 CHRONIC KIDNEY DISEASE, WITH LONG-TERM CURRENT USE OF INSULIN (HCC): ICD-10-CM

## 2019-11-11 DIAGNOSIS — E78.2 MIXED HYPERLIPIDEMIA: ICD-10-CM

## 2019-11-11 DIAGNOSIS — N18.4 CONTROLLED TYPE 2 DIABETES MELLITUS WITH STAGE 4 CHRONIC KIDNEY DISEASE, WITH LONG-TERM CURRENT USE OF INSULIN (HCC): ICD-10-CM

## 2019-11-11 DIAGNOSIS — I48.0 PAF (PAROXYSMAL ATRIAL FIBRILLATION) (HCC): ICD-10-CM

## 2019-11-12 DIAGNOSIS — I48.0 PAF (PAROXYSMAL ATRIAL FIBRILLATION) (HCC): ICD-10-CM

## 2019-11-12 LAB
ALBUMIN SERPL-MCNC: 3.5 G/DL (ref 3.6–4.8)
ALBUMIN/GLOB SERPL: 1.1 {RATIO} (ref 1.2–2.2)
ALP SERPL-CCNC: 92 IU/L (ref 39–117)
ALT SERPL-CCNC: 6 IU/L (ref 0–32)
AST SERPL-CCNC: 16 IU/L (ref 0–40)
BILIRUB SERPL-MCNC: 0.3 MG/DL (ref 0–1.2)
BUN SERPL-MCNC: 24 MG/DL (ref 8–27)
BUN/CREAT SERPL: 13 (ref 12–28)
CALCIUM SERPL-MCNC: 9.2 MG/DL (ref 8.7–10.3)
CHLORIDE SERPL-SCNC: 102 MMOL/L (ref 96–106)
CHOLEST SERPL-MCNC: 141 MG/DL (ref 100–199)
CO2 SERPL-SCNC: 20 MMOL/L (ref 20–29)
CREAT SERPL-MCNC: 1.92 MG/DL (ref 0.57–1)
EST. AVERAGE GLUCOSE BLD GHB EST-MCNC: 146 MG/DL
GLOBULIN SER CALC-MCNC: 3.1 G/DL (ref 1.5–4.5)
GLUCOSE SERPL-MCNC: 161 MG/DL (ref 65–99)
HBA1C MFR BLD: 6.7 % (ref 4.8–5.6)
HDLC SERPL-MCNC: 32 MG/DL
INR PPP: 1.6 (ref 0.8–1.2)
LDLC SERPL CALC-MCNC: 82 MG/DL (ref 0–99)
POTASSIUM SERPL-SCNC: 4.6 MMOL/L (ref 3.5–5.2)
PROT SERPL-MCNC: 6.6 G/DL (ref 6–8.5)
PROTHROMBIN TIME: 15.8 SEC (ref 9.1–12)
SODIUM SERPL-SCNC: 139 MMOL/L (ref 134–144)
TRIGL SERPL-MCNC: 137 MG/DL (ref 0–149)
VLDLC SERPL CALC-MCNC: 27 MG/DL (ref 5–40)

## 2019-11-12 RX ORDER — WARFARIN 4 MG/1
TABLET ORAL
Qty: 60 TAB | Refills: 5 | Status: ON HOLD
Start: 2019-11-12 | End: 2019-12-09 | Stop reason: SDUPTHER

## 2019-11-13 ENCOUNTER — OFFICE VISIT (OUTPATIENT)
Dept: INTERNAL MEDICINE CLINIC | Facility: CLINIC | Age: 59
End: 2019-11-13

## 2019-11-13 VITALS
RESPIRATION RATE: 12 BRPM | HEIGHT: 69 IN | TEMPERATURE: 98 F | WEIGHT: 254.5 LBS | DIASTOLIC BLOOD PRESSURE: 82 MMHG | HEART RATE: 65 BPM | OXYGEN SATURATION: 98 % | SYSTOLIC BLOOD PRESSURE: 140 MMHG | BODY MASS INDEX: 37.69 KG/M2

## 2019-11-13 DIAGNOSIS — E11.3522: ICD-10-CM

## 2019-11-13 DIAGNOSIS — N18.4 CONTROLLED TYPE 2 DIABETES MELLITUS WITH STAGE 4 CHRONIC KIDNEY DISEASE, WITH LONG-TERM CURRENT USE OF INSULIN (HCC): Primary | ICD-10-CM

## 2019-11-13 DIAGNOSIS — Z79.4 CONTROLLED TYPE 2 DIABETES MELLITUS WITH STAGE 4 CHRONIC KIDNEY DISEASE, WITH LONG-TERM CURRENT USE OF INSULIN (HCC): Primary | ICD-10-CM

## 2019-11-13 DIAGNOSIS — Z95.0 S/P CARDIAC PACEMAKER PROCEDURE: ICD-10-CM

## 2019-11-13 DIAGNOSIS — E11.22 CONTROLLED TYPE 2 DIABETES MELLITUS WITH STAGE 4 CHRONIC KIDNEY DISEASE, WITH LONG-TERM CURRENT USE OF INSULIN (HCC): Primary | ICD-10-CM

## 2019-11-13 DIAGNOSIS — I49.5 SICK SINUS SYNDROME (HCC): ICD-10-CM

## 2019-11-13 DIAGNOSIS — I10 ESSENTIAL HYPERTENSION: ICD-10-CM

## 2019-11-13 DIAGNOSIS — E11.42 DIABETIC POLYNEUROPATHY ASSOCIATED WITH TYPE 2 DIABETES MELLITUS (HCC): ICD-10-CM

## 2019-11-13 DIAGNOSIS — E66.01 MORBID OBESITY WITH BMI OF 40.0-44.9, ADULT (HCC): ICD-10-CM

## 2019-11-13 DIAGNOSIS — I48.0 PAF (PAROXYSMAL ATRIAL FIBRILLATION) (HCC): ICD-10-CM

## 2019-11-13 DIAGNOSIS — E78.2 MIXED HYPERLIPIDEMIA: ICD-10-CM

## 2019-11-13 NOTE — PROGRESS NOTES
HISTORY OF PRESENT ILLNESS  Myrna Barrett is a 60 y.o. female. HPI   She presents for follow up of diabetes mellitus with CKD, neuropathy and retinopathy, hypertension, hyperlipidemia, PAF/SSS with PM and obesity contitinues with .wound care for foot ulcers. Left foot is not healing. Diet and Lifestyle: generally follows a low fat low cholesterol diet, generally follows a low sodium diet, follows a diabetic diet regularly, sedentary, nonsmoker  Diabetic ROS - medication compliance: compliant all of the time,      home glucose monitoring: fasting 109-123,      home BP Monitoring: not checked. further diabetic ROS: no polyuria or polydipsia, no unusual visual symptoms, no hypoglycemia, no medication side effects noted, has dysesthesias in the feet. Cardiovascular ROS:  She complains of lower extremity edema.    She denies palpitations, orthopnea, exertional chest pressure/discomfort, claudication, dyspnea on exertion, dizziness    Patient Active Problem List   Diagnosis Code    History of breast cancer Z85.3    Asthma J45.909    Fe deficiency anemia D50.9    Status post ablation of atrial fibrillation Z98.890, Z86.79    Sick sinus syndrome ([facility]) I49.5    S/P cardiac pacemaker procedure Z95.0    Long term (current) use of anticoagulants Z79.01    Mixed hyperlipidemia E78.2    CKD (chronic kidney disease), stage IV ([facility]) C39.1    Systolic murmur Y52.0    Essential hypertension I10    PAF (paroxysmal atrial fibrillation) ([facility]) I48.0    Anemia in stage 4 chronic kidney disease ([facility]) N18.4, D63.1    Controlled type 2 diabetes mellitus with stage 4 chronic kidney disease, with long-term current use of insulin ([facility]) E11.22, N18.4, Z79.4    Morbid obesity with BMI of 40.0-44.9, adult ([facility]) E66.01, Z68.41    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, associated with type 2 diabetes mellitus ([facility] I58.9972    Diabetic polyneuropathy associated with type 2 diabetes mellitus (HCC) E11.42    Venous stasis ulcer of right lower leg with edema of right lower leg (HCC) I83.019, I83.891, L97.919, R60.9    Laceration of right leg excluding thigh, subsequent encounter S81.811D    Open breast wound, left, initial encounter S21.002A    Diabetic ulcer of left heel associated with type 2 diabetes mellitus, with necrosis of muscle (HCC) E11.621, L97.423    Diabetic ulcer of right midfoot associated with type 2 diabetes mellitus, with fat layer exposed ([facility] E11.621, L97.412    Cellulitis of right lower extremity L03.115    Foot deformity, acquired, left M21.962    Foot deformity, right M21.961    Pes cavus Q66.70    Type 2 diabetes mellitus with left diabetic foot infection (HCC) E11.628, L08.9     Past Medical History:   Diagnosis Date    Acquired lymphedema     Right arm    Arrhythmia     Asthma     Atrial fibrillation ([facility])     Dr. Saba Avina and Dr. Sona Amaral [facility] ([facility]     Cancer [facility])     bilateral breast    Chronic kidney disease     Diabetes mellitus type II     Dizziness     Essential hypertension     Hyperlipidemia     Hypertension     Long term (current) use of anticoagulants     Morbid obesity ([facility] 3/14/2012    Osteoarthritis     Pacemaker     Sick sinus syndrome ([facility])      Past Surgical History:   Procedure Laterality Date    ABDOMEN SURGERY PROC UNLISTED      gastric bypass    [facility]150CM [facility]    HX AFIB ABLATION      HX CARPAL TUNNEL RELEASE      HX CERVICAL FUSION  1994    HX DILATION AND CURETTAGE  1992    HX MASTECTOMY  1998    RIGHT MODIFIED RADICAL     HX MOHS PROCEDURES  1995    right    HX ORTHOPAEDIC      ight knee surgery    HX PACEMAKER      IR INSERT TUNL CVC W PORT OVER 5 YEARS      LAMINECTOMY,CERVICAL  1994    NEEDLE BIOPSY LIVER,W OTHR PROC  8.21.07    [place] ANESTH,KNEE AREA SURGERY  1982 AND 1994    [place] TRABECULOPLASTY BY LASER SURGERY      US GUIDE ASP OVARIAN CYST ABD APPROACH  8.21.07    RIGHT      Social History     Socioeconomic History    Marital status:      Spouse name: Not on file    Number of children: Not on file    Years of education: Not on file    Highest education level: Not on file   Tobacco Use    Smoking status: Never Smoker    Smokeless tobacco: Never Used   Substance and Sexual Activity    Alcohol use: Yes     Comment: rare    Drug use: No     Family History   Problem Relation Age of Onset    Cancer Mother     Heart Disease Mother     Alcohol abuse Father     Diabetes Brother     Hypertension Brother      Allergies   Allergen Reactions    Ace Inhibitors Cough    Amlodipine Swelling    Avapro [Irbesartan] Unable to Obtain    Bactrim [Sulfamethoxazole-Trimethoprim] Other (comments)     Sore mouth    Captopril Cough    Carvedilol Diarrhea    Contrast Agent [Iodine] Itching    Fish Containing Products Hives    Morphine Nausea and Vomiting and Swelling    Penicillins Hives    Pravachol [Pravastatin] Nausea Only    Seafood [Shellfish Containing Products] Hives    Singulair [Montelukast] Other (comments)     palpitations     Current Outpatient Medications   Medication Sig Dispense Refill    warfarin (COUMADIN) 4 mg tablet Take 2 tablets on Nov 12 then 1 tablet on Monday and Thursday and  a half tablet the other 5 days. 60 Tab 5    insulin glargine (LANTUS U-100 INSULIN) 100 unit/mL injection Inject 20 units daily 10 mL 5    hydrALAZINE (APRESOLINE) 100 mg tablet TAKE ONE TABLET BY MOUTH THREE TIMES A DAY 90 Tab 11    sertraline (ZOLOFT) 50 mg tablet TAKE ONE AND ONE-HALF (1 & 1/2) TABLET BY MOUTH DAILY 45 Tab 5    bumetanide (BUMEX) 1 mg tablet Take 2 mg by mouth daily.  cloNIDine HCl (CATAPRES) 0.3 mg tablet Take 0.6 mg by mouth nightly.       metoprolol succinate (TOPROL-XL) 100 mg tablet TAKE TWO TABLETS BY MOUTH DAILY 60 Tab 9    doxazosin (CARDURA) 4 mg tablet TAKE ONE TABLET BY MOUTH ONCE NIGHTLY 30 Tab 10    busPIRone (BUSPAR) 10 mg tablet TAKE ONE TABLET BY MOUTH TWICE A DAY 60 Tab 10    potassium chloride SR (KLOR-CON 10) 10 mEq tablet TAKE ONE TABLET BY MOUTH DAILY 90 Tab 3    metolazone (ZAROXOLYN) 5 mg tablet Take 1 Tab by mouth daily as needed (take if develop increased LE edema, weight gain > 5 pounds. ). 30 Tab 1    cholecalciferol, VITAMIN D3, (VITAMIN D3) 5,000 unit tab tablet Take 5,000 Units by mouth daily.  vitamin A 8,000 unit capsule Take 8,000 Units by mouth daily.  insulin lispro (HUMALOG) 100 unit/mL kwikpen 2 units with dinner for sugars over 200 1 Package 0    cyanocobalamin (VITAMIN B-12) 1,000 mcg sublingual tablet Take 1,000 mcg by mouth daily. Review of Systems   Constitutional: Negative for malaise/fatigue and weight loss. Gastrointestinal: Negative for constipation and diarrhea. Musculoskeletal: Positive for back pain and joint pain. Neurological: Positive for dizziness (when lies down, spinning). Negative for tingling and focal weakness. Visit Vitals  /82 (BP 1 Location: Left arm, BP Patient Position: Sitting)   Pulse 65   Temp 98 °F (36.7 °C) (Oral)   Resp 12   Ht 5' 9\" (1.753 m)   Wt 254 lb 8 oz (115.4 kg)   SpO2 98%   BMI 37.58 kg/m²     Physical Exam   Constitutional: She is oriented to person, place, and time. She appears well-developed and well-nourished. HENT:   Head: Normocephalic and atraumatic. Eyes: Pupils are equal, round, and reactive to light. Conjunctivae are normal.   Neck: Neck supple. Carotid bruit is not present. No thyromegaly present. Cardiovascular: Normal rate, regular rhythm and normal heart sounds. PMI is not displaced. Exam reveals no gallop. No murmur heard. Pulses:       Dorsalis pedis pulses are 1+ on the right side, and 1+ on the left side. Posterior tibial pulses are 1+ on the right side, and 1+ on the left side. Pulmonary/Chest: Effort normal. She has no wheezes. She has no rhonchi.  She has no rales. Abdominal: Soft. Normal appearance. She exhibits no abdominal bruit and no mass. There is no hepatosplenomegaly. There is no tenderness. Musculoskeletal: She exhibits no edema. Lymphadenopathy:     She has no cervical adenopathy. Right: No supraclavicular adenopathy present. Left: No supraclavicular adenopathy present. Neurological: She is alert and oriented to person, place, and time. No sensory deficit. Skin: Skin is warm, dry and intact. No rash noted. Psychiatric: She has a normal mood and affect. Her behavior is normal.   Nursing note and vitals reviewed.     Lab Results   Component Value Date/Time    Hemoglobin A1c 6.7 (H) 11/11/2019 08:47 AM    Hemoglobin A1c (POC) 7.2 07/10/2019 03:14 PM     Lab Results   Component Value Date/Time    INR 1.6 (H) 11/11/2019 08:47 AM    INR 2.2 (H) 09/25/2019 10:40 AM    INR 2.7 (H) 08/29/2019 03:12 PM    INR POC 2.2 11/01/2019 02:21 PM    Prothrombin time 15.8 (H) 11/11/2019 08:47 AM    Prothrombin time 21.3 (H) 09/25/2019 10:40 AM    Prothrombin time 26.1 (H) 08/29/2019 03:12 PM     Lab Results   Component Value Date/Time    Microalb/Creat ratio (ug/mg creat.) 265.0 (H) 11/29/2018 03:57 PM     Lab Results   Component Value Date/Time    Cholesterol, total 141 11/11/2019 08:47 AM    HDL Cholesterol 32 (L) 11/11/2019 08:47 AM    LDL, calculated 82 11/11/2019 08:47 AM    VLDL, calculated 27 11/11/2019 08:47 AM    Triglyceride 137 11/11/2019 08:47 AM     Lab Results   Component Value Date/Time    Sodium 139 11/11/2019 08:47 AM    Potassium 4.6 11/11/2019 08:47 AM    Chloride 102 11/11/2019 08:47 AM    CO2 20 11/11/2019 08:47 AM    Anion gap 9 09/03/2019 06:38 PM    Glucose 161 (H) 11/11/2019 08:47 AM    BUN 24 11/11/2019 08:47 AM    Creatinine 1.92 (H) 11/11/2019 08:47 AM    BUN/Creatinine ratio 13 11/11/2019 08:47 AM    GFR est AA 32 (L) 11/11/2019 08:47 AM    GFR est non-AA 28 (L) 11/11/2019 08:47 AM    Calcium 9.2 11/11/2019 08:47 AM Bilirubin, total 0.3 11/11/2019 08:47 AM    AST (SGOT) 16 11/11/2019 08:47 AM    Alk. phosphatase 92 11/11/2019 08:47 AM    Protein, total 6.6 11/11/2019 08:47 AM    Albumin 3.5 (L) 11/11/2019 08:47 AM    Globulin 4.2 (H) 09/03/2019 06:38 PM    A-G Ratio 1.1 (L) 11/11/2019 08:47 AM    ALT (SGPT) 6 11/11/2019 08:47 AM     Lab Results   Component Value Date/Time    WBC 10.8 09/03/2019 06:38 PM    Hemoglobin (POC) 13.3 09/09/2011 08:34 AM    HGB 11.4 (L) 09/03/2019 06:38 PM    Hematocrit (POC) 39 09/09/2011 08:34 AM    HCT 35.1 09/03/2019 06:38 PM    PLATELET 905 06/08/3447 06:38 PM    MCV 93.4 09/03/2019 06:38 PM       ASSESSMENT and PLAN    ICD-10-CM ICD-9-CM    1. Controlled type 2 diabetes mellitus with stage 4 chronic kidney disease, with long-term current use of insulin ([facility]) E11.22 250.40 MICROALBUMIN, UR, RAND W/ MICROALB/CREAT RATIO    N18.4 585.4     Z79.4 V58.67    2. Diabetic polyneuropathy associated with type 2 diabetes mellitus ([facility]) E11.42 250.60      357.2    3. 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, associated with type 2 diabetes mellitus ([facility] L73.0691 250.50      362.02      361.81    4. Essential hypertension I10 401.9    5. Mixed hyperlipidemia E78.2 272.2    6. PAF (paroxysmal atrial fibrillation) ([facility]) I48.0 427.31    7. Sick sinus syndrome ([facility]) I49.5 427.81    8. Morbid obesity with BMI of 40.0-44.9, adult ([facility]) E66.01 278.01     Z68.41 V85.41    9. S/P cardiac pacemaker procedure Z95.0 V45.01      Diagnoses and all orders for this visit:    1. Controlled type 2 diabetes mellitus with stage 4 chronic kidney disease, with long-term current use of insulin ([facility]  Diabetes Mellitus: well controlled. Issues reviewed with her: low cholesterol diet, weight control and daily exercise discussed and glycohemoglobin and other lab monitoring discussed. Is not  taking statin due to intolerance.    Is not taking ACE/ARB due to renal disease. .  -     MICROALBUMIN, UR, RAND W/ MICROALB/CREAT RATIO    2. Diabetic polyneuropathy associated with type 2 diabetes mellitus ([facility]  Continues to be followed weekly at wound clinic for bilateral foot ulcers. 3. 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, associated with type 2 diabetes mellitus ([facility]  Followed by ophthalmology    4. Essential hypertension  Hypertension is borderline controlled on multiple medications. .    5. Mixed hyperlipidemia  Hyperlipidemia is controlled    6. PAF (paroxysmal atrial fibrillation) (HCC)  Stable on warfarin and BB    7. Sick sinus syndrome (HCC)  A fib rate controlled with PM    8. Morbid obesity with BMI of 40.0-44.9, adult ([facility]  Discussed using smaller plate for portion control, keeping a food diary for awareness of food consumed, checking weight often, and increasing physical activity. Will re-evaluate next visit. 9. S/P cardiac pacemaker procedure      Follow-up and Dispositions    · Return in about 4 months (around 3/13/2020) for DM, HTN, chol. POC A1c.       lab results and schedule of future lab studies reviewed with patient  reviewed diet, exercise and weight control  I have discussed the diagnosis, evaluation and treatment options and the intended plan with the patient. Patient understands and is in agreement. The patient has received an after-visit summary and questions were answered concerning future plans. I have discussed side effects and warnings of any new medications with the patient as well.

## 2019-11-13 NOTE — PROGRESS NOTES
Priya Sarabia  Identified pt with two pt identifiers(name and ). Chief Complaint   Patient presents with    Diabetes    Hypertension    Cholesterol Problem       Reviewed record In preparation for visit and have obtained necessary documentation. Has info on advanced directive but has not filled them out. 1. Have you been to the ER, urgent care clinic or hospitalized since your last visit? No     2. Have you seen or consulted any other health care providers outside of the [address] [place] [address] since your last visit? Include any pap smears or colon screening. Wound care    Vitals reviewed with provider.     Health Maintenance reviewed:     Health Maintenance Due   Topic    EYE EXAM RETINAL OR DILATED     FOOT EXAM Q1     MICROALBUMIN Q1     COLONOSCOPY           Wt Readings from Last 3 Encounters:   19 254 lb 8 oz (115.4 kg)   19 254 lb (115.2 kg)   19 260 lb 9.3 oz (118.2 kg)        Temp Readings from Last 3 Encounters:   19 98 °F (36.7 °C) (Oral)   19 96.1 °F (35.6 °C)   19 97.9 °F (36.6 °C) (Oral)        BP Readings from Last 3 Encounters:   19 185/82   19 154/67   19 176/76        Pulse Readings from Last 3 Encounters:   19 65   19 63   19 84        Vitals:    19 0928 19 0930   BP: 196/80 185/82   Pulse: 65    Resp: 12    Temp: 98 °F (36.7 °C)    TempSrc: Oral    SpO2: 98%    Weight: 254 lb 8 oz (115.4 kg)    Height: 5' 9\" (1.753 m)    PainSc:   3    PainLoc: Foot           Learning Assessment:   :       Learning Assessment 3/25/2014   PRIMARY LEARNER Patient   HIGHEST LEVEL OF EDUCATION - PRIMARY LEARNER  2 YEARS OF COLLEGE   BARRIERS PRIMARY LEARNER NONE   CO-LEARNER CAREGIVER No   PRIMARY LANGUAGE ENGLISH    NEED No   LEARNER PREFERENCE PRIMARY DEMONSTRATION   LEARNING SPECIAL TOPICS Does does a pacemaker any noise   ANSWERED BY patient   RELATIONSHIP SELF        Depression Screening:   :       3 most recent PHQ Screens 3/11/2019   Little interest or pleasure in doing things Not at all   Feeling down, depressed, irritable, or hopeless Not at all   Total Score PHQ 2 0        Fall Risk Assessment:   :       Fall Risk Assessment, last 12 mths 8/15/2019   Able to walk? Yes   Fall in past 12 months? No        Abuse Screening:   :       Abuse Screening Questionnaire 8/15/2019   Do you ever feel afraid of your partner? N   Are you in a relationship with someone who physically or mentally threatens you? N   Is it safe for you to go home?  Y        ADL Screening:   :       ADL Assessment 11/17/2014   Feeding yourself No Help Needed   Getting from bed to chair No Help Needed   Getting dressed No Help Needed   Bathing or showering No Help Needed   Walk across the room (includes cane/walker) No Help Needed   Using the telphone No Help Needed   Taking your medications No Help Needed   Preparing meals No Help Needed   Managing money (expenses/bills) No Help Needed   Moderately strenuous housework (laundry) No Help Needed   Shopping for personal items (toiletries/medicines) No Help Needed   Shopping for groceries No Help Needed   Driving No Help Needed   Climbing a flight of stairs No Help Needed   Getting to places beyond walking distances No Help Needed

## 2019-11-14 LAB
ALBUMIN/CREAT UR: ABNORMAL MG/G CREAT (ref 0–30)
CREAT UR-MCNC: 7.9 MG/DL
MICROALBUMIN UR-MCNC: 62.6 UG/ML

## 2019-11-14 NOTE — PROGRESS NOTES
Urine was too dilute to do microalbumin/creatinine ratio. We will need a new sample the next time you come in. Patient informed in My Chart.

## 2019-11-19 ENCOUNTER — HOSPITAL ENCOUNTER (OUTPATIENT)
Dept: WOUND CARE | Age: 59
Discharge: HOME | End: 2019-11-19
Payer: COMMERCIAL

## 2019-11-19 VITALS
DIASTOLIC BLOOD PRESSURE: 62 MMHG | HEART RATE: 85 BPM | SYSTOLIC BLOOD PRESSURE: 128 MMHG | TEMPERATURE: 98.4 F | RESPIRATION RATE: 16 BRPM

## 2019-11-19 PROCEDURE — 11042 DBRDMT SUBQ TIS 1ST 20SQCM/<: CPT

## 2019-11-19 PROCEDURE — 74011000250 HC RX REV CODE- 250: Performed by: PODIATRIST

## 2019-11-19 PROCEDURE — 11045 DBRDMT SUBQ TISS EACH ADDL: CPT

## 2019-11-19 RX ADMIN — Medication: at 14:47

## 2019-11-19 NOTE — WOUND CARE
11/19/19 1442 Wound Breast Left Date First Assessed/Time First Assessed: 06/07/19 0834   Location: Breast  Wound Location Orientation: Left Dressing Type Open to air Wound Length (cm) 0 cm Wound Width (cm) 0 cm Wound Depth (cm) 0 cm Wound Surface Area (cm^2) 0 cm^2 Wound Volume (cm^3) 0 cm^3 Wound Foot Right;Plantar;Lateral 07/30/19 Date First Assessed/Time First Assessed: 07/30/19 1034   Present on Hospital Admission: Yes  Wound Approximate Age at First Assessment (Weeks): 1 weeks  Primary Wound Type: Diabetic Ulcer  Location: Foot  Wound Location Orientation: Right;Plantar;Late. .. Dressing Status Removed Dressing Type Gauze;Gauze wrap (arnie) Wound Length (cm) 1.3 cm Wound Width (cm) 1.4 cm Wound Depth (cm) 0.3 cm Wound Surface Area (cm^2) 1.82 cm^2 Wound Volume (cm^3) 0.55 cm^3 Condition of Base Pink Condition of Edges Calloused Undermining (cm) 0.2 cm 
(@6) Direction of Undermining 6 o'clock;8 o'clock Drainage Amount Moderate Drainage Color Serosanguinous Wound Odor None Sandra-wound Assessment Maceration Cleansing and Cleansing Agents  Normal saline Wound Foot Left;Plantar Date First Assessed/Time First Assessed: 06/21/19 0857   Present on Hospital Admission: Yes  Primary Wound Type: Diabetic  Location: Foot  Wound Location Orientation: Left;Plantar Dressing Status Removed Dressing Type Gauze;Gauze wrap (arnie) Wound Length (cm) 2.5 cm Wound Width (cm) 2 cm Wound Depth (cm) 0.6 cm Wound Surface Area (cm^2) 5 cm^2 Wound Volume (cm^3) 3 cm^3 Condition of Base Pink;Slough Condition of Edges Calloused Undermining (cm) 0.8 cm 
(@9) Direction of Undermining 6 o'clock;9 o'clock Drainage Amount Moderate Drainage Color Serosanguinous Wound Odor None Sandra-wound Assessment Maceration Cleansing and Cleansing Agents  Normal saline

## 2019-11-19 NOTE — WOUND CARE
11/19/19 1507 Wound Foot Right;Plantar;Lateral 07/30/19 Date First Assessed/Time First Assessed: 07/30/19 1034   Present on Hospital Admission: Yes  Wound Approximate Age at First Assessment (Weeks): 1 weeks  Primary Wound Type: Diabetic Ulcer  Location: Foot  Wound Location Orientation: Right;Plantar;Late. .. Dressing Type Applied Collagens/cell matrix;Foam;Gauze;Gauze wrap (arnie); Special tape (comment) 
(endofoam, HFBR) Wound Procedure Type Other 
(debriedment) Procedure Time Out 8662 Consent Obtained  Yes Procedure Bleeding Minimal  
Procedure Hemostasis  Pressure Type of Tissue Removed  Devitalized Procedure Instrument  Blade Debridement Procedure Performed by Dr Tucker American Post-Procedure Length (cm) 1.3 cm Post-Procedure Width (cm) 1.4 cm Post-Procedure Depth (cm) 0.3 cm Post-Procedure Volume (cm^3) 0.55 cm^3 Post-Procedure Surface Area (cm^2) 1.82 cm^2 Procedure Tolerated Well Wound Foot Left;Plantar Date First Assessed/Time First Assessed: 06/21/19 0857   Present on Hospital Admission: Yes  Primary Wound Type: Diabetic  Location: Foot  Wound Location Orientation: Left;Plantar Dressing Type Applied Collagens/cell matrix;Foam;Gauze;Gauze wrap (arnie); Special tape (comment) 
(endofoam,HFBR, foam windowpane) Wound Procedure Type  
(debridment) Procedure Time Out 1171 Consent Obtained  Yes Procedure Bleeding Moderate Procedure Hemostasis  Silver Nitrate Procedure Instrument  Blade;Curette Post-Procedure Length (cm) 2.5 cm Post-Procedure Width (cm) 2 cm Post-Procedure Depth (cm) 6 cm Post-Procedure Volume (cm^3) 30 cm^3 Post-Procedure Surface Area (cm^2) 5 cm^2 Procedure Tolerated Well Discharge Condition: Stable Pain: 0 Ambulatory Status: Walking Discharge Destination: Home Transportation: Car Accompanied by: Self Discharge instructions reviewed with Patient and copy or written instructions have been provided. All questions/concerns have been addressed at this time.

## 2019-11-19 NOTE — DISCHARGE INSTRUCTIONS
Clean both wounds with NS. Breanna Moffett dry. Shaunna Pass Apply endoform, HFBR, and gauze. . Care to be done every other day, use white foam to window pane left foot wound to assist in offloading as much as possible. . To RTC in 1 week. . Will see Dr Patricia Floyd on Monday. Discharge Instructions for  [address], [address] [facility]  Telephone: [phone]    NAME:  Carey Marti OF BIRTH:  1960  MEDICAL RECORD NUMBER:  [id number]  DATE:  11/19/2019    Wound Cleansing:   Do not scrub or use excessive force. Cleanse wound prior to applying a clean dressing with:  [x] Normal Saline [] Keep Wound Dry in Shower    [] Wound Cleanser   [] Cleanse wound with Mild Soap & Water  [] May Shower at Discharge   [] Other:      Topical Treatments:  Do not apply lotions, creams, or ointments to wound bed unless directed. [] Apply moisturizing lotion to skin surrounding the wound prior to dressing change.  [] Apply antifungal ointment to skin surrounding the wound prior to dressing change.  [] Apply thin film of moisture barrier ointment to skin immediately around wound. [] Other:       Dressings:           Wound Location right plantar foot, left heel,  [] Apply Primary Dressing:       [] MediHoney Gel [] Alginate with Silver [] Alginate   [x] Collagen. .. Endoform, HFBR, gauze RG  Window pane left heel as much as possible to assist in off loading. .[] Collagen with Silver   [] Santyl with Moisten saline gauze     [] Hydrocolloid   [] MediHoney Alginate [] Foam with Silver   [] Foam   [] Hydrofera Blue    [] Mepilex Border    [] Moisten with Saline [] Hydrogel [] Mepitel  [] Betadine moist gauze   [] Bactroban/Mupirocin [] Polysporin  [] Other:    [] Pack wound loosely with  [] Iodoform   [] Plain Packing  [] Other   [x] Cover and Secure with:     [x] Gauze, RG [] Tejinder [] Kerlix   [] Ace Wrap [] Cover Roll Tape [] ABD [] Exudry    [] Other:    Avoid contact of tape with skin. [] Change dressing: [] Daily    [] Every Other Day [] Three times per week   [] Once a week [] Do Not Change Dressing   [] Other:     Edema Control:  Apply: [] Compression Stocking []Right Leg []Left Leg   [] Tubigrip []Right Leg Double Layer []Left Leg Double Layer       []Right Leg Single Layer []Left Leg Single Layer   [] SpandaGrip []Right Leg  []Left Leg      []Low compression 5-10 mm/Hg      []Medium compression 10-20 mm/Hg     []High compression  20-30 mm/Hg   every morning immediately when getting up should be applied to affected leg(s) from mid foot to knee making sure to cover the heel. Remove every night before going to bed. [] Elevate leg(s) above the level of the heart when sitting. [] Avoid prolonged standing in one place. [] Elevate arm/hand above the level of the heart []RightArm []LeftArm    Off-Loading:   [x] Off-loading when [x] walking  [] in bed [] sitting  [] Total non-weight bearing  [] Right Leg  [] Left Leg   [] Assistive Device [] Walker [] Cane  [] Wheelchair  [] Crutches   [] Surgical shoe    [] Podus Boot(s)   [] Foam Boot(s)  [] Roll About    [] Cast Boot [] CROW Boot  [] Other:    Contact Cast:  Apply: [] Total Contact Cast Applied in Clinic []RightLeg []Left Leg   [] Do not get cast wet. Contact center or go to emergency room if there is a foul odor or becomes uncomfortable due to feeling tight or swelling. Do not use objects inside of cast to scratch.       Dietary:  [x] Diet as tolerated: [x] Diabetic Diet: Low carb no sugar [] No Added Salt:  [] Increase Protein: [] Other:   Activity:  [x] Activity as tolerated:  [] Patient has no activity restrictions     [] Strict Bedrest: [] Remain off Work:     [] May return to full duty work:                                   [] Return to work with restrictions:             Return Appointment:  [] Wound and dressing supply provider:   [] ECF or Home Healthcare:  [] Wound Assessment: [] Physician or NP scheduled for Wound Assessment:   [] Return Appointment: With Dr Patricia Floyd in [facility])  [] Ordered tests:      Electronically signed Caprice Ritter RN on 11/19/2019 at 3:19 PM     [address] Information: Should you experience any significant changes in your wound(s) or have questions about your wound care, please contact the [facility] Main at [address] [place] [address] 8:00 am - 4:30. If you need help with your wound outside these hours and cannot wait until we are again available, contact your PCP or go to the hospital emergency room. PLEASE NOTE: IF YOU ARE UNABLE TO OBTAIN WOUND SUPPLIES, CONTINUE TO USE THE SUPPLIES YOU HAVE AVAILABLE UNTIL YOU ARE ABLE TO REACH US. IT IS MOST IMPORTANT TO KEEP THE WOUND COVERED AT ALL TIMES.

## 2019-11-19 NOTE — WOUND CARE
11/19/19 1507 Wound Foot Right;Plantar;Lateral 07/30/19 Date First Assessed/Time First Assessed: 07/30/19 1034   Present on Hospital Admission: Yes  Wound Approximate Age at First Assessment (Weeks): 1 weeks  Primary Wound Type: Diabetic Ulcer  Location: Foot  Wound Location Orientation: Right;Plantar;Late. .. Wound Procedure Type Other 
(debriedment) Procedure Time Out 2820 Consent Obtained  Yes Procedure Bleeding Minimal  
Procedure Hemostasis  Pressure Type of Tissue Removed  Devitalized Procedure Instrument  Blade Debridement Procedure Performed by Dr Lisa Hercules Post-Procedure Length (cm) 1.3 cm Post-Procedure Width (cm) 1.4 cm Post-Procedure Depth (cm) 0.3 cm Post-Procedure Volume (cm^3) 0.55 cm^3 Post-Procedure Surface Area (cm^2) 1.82 cm^2 Procedure Tolerated Well Wound Foot Left;Plantar Date First Assessed/Time First Assessed: 06/21/19 0857   Present on Hospital Admission: Yes  Primary Wound Type: Diabetic  Location: Foot  Wound Location Orientation: Left;Plantar Wound Procedure Type  
(debridment) Procedure Time Out 5281 Consent Obtained  Yes Procedure Bleeding Moderate Procedure Hemostasis  Silver Nitrate Procedure Instrument  Blade;Curette Post-Procedure Length (cm) 2.5 cm Post-Procedure Width (cm) 2 cm Post-Procedure Depth (cm) 6 cm Post-Procedure Volume (cm^3) 30 cm^3 Post-Procedure Surface Area (cm^2) 5 cm^2 Procedure Tolerated Well

## 2019-11-19 NOTE — PROGRESS NOTES
Patient presents to wound clinic for: Abad Ordoñez is a 60 y.o. female who presents for wound care of bilateral plantar foot ulcers. Patient currently being treated by Dr. Zeina Barksdale for a wound of the left breast, and was referred to me for further offloading and wound care recommendations of the bilateral heel ulcers. The ulcer of the plantar left heel occurred first and patient was placed in a heel offloading shoe. She then subsequently developed an ulceration of the plantar 5th met base of the right foot. She has been wearing her sneakers with her offloading pads. 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 Denies any issues with abx. Denies f/c/n/v/d. Denies any increased pain to foot. Was not able to make last week's appointment due to snow. Notes that she was able to get fitted for her diabetic shoes and that she is supposed to receive them the first week of December. Pertinent Medical History: 
Past Medical History:  
Diagnosis Date  Acquired lymphedema Right arm  Arrhythmia  Asthma  Atrial fibrillation ([facility] [address] Dr. Sela Lesch and Dr. Paige Reza [facility])  Cancer ([facility] [address] bilateral breast  
 Chronic kidney disease  Diabetes mellitus type II   
 Dizziness  Essential hypertension  Hyperlipidemia  Hypertension  Long term (current) use of anticoagulants  Morbid obesity ([facility] [address] 3/14/2012  Osteoarthritis  Pacemaker  Sick sinus syndrome ([facility] [address] Past Surgical History:  
Procedure Laterality Date  ABDOMEN SURGERY PROC UNLISTED    
 gastric bypass  GASTRIC BYPASS,OBESE<150CM SAW-EN-Y  7/08  
 guanakito  HX AFIB ABLATION    
 HX CARPAL TUNNEL RELEASE [address] [address] RIGHT MODIFIED RADICAL   
 HX MOHS PROCEDURES  1995  
 right  HX ORTHOPAEDIC    
 ight knee surgery  HX PACEMAKER    
  IR INSERT TUNL CVC W PORT OVER 5 YEARS    
 LAMINECTOMY,CERVICAL  1994  
 NEEDLE BIOPSY LIVER,W OTHR [address]  8.21.07  
 MO [address] AND 1994  MO TRABECULOPLASTY BY LASER SURGERY    
 US GUIDE ASP OVARIAN CYST ABD APPROACH  8.21.07  
 RIGHT Prior to Admission medications Medication Sig Start Date End Date Taking? Authorizing Provider  
warfarin (COUMADIN) 4 mg tablet Take 2 tablets on Nov 12 then 1 tablet on Monday and Thursday and  a half tablet the other 5 days. 11/12/19   Dmitri Ulloa MD  
insulin glargine (LANTUS U-100 INSULIN) 100 unit/mL injection Inject 20 units daily 11/10/19   Dmitri Ulloa MD  
hydrALAZINE (APRESOLINE) 100 mg tablet TAKE ONE TABLET BY MOUTH THREE TIMES A DAY 10/21/19   Dmitri Ulloa MD  
sertraline (ZOLOFT) 50 mg tablet TAKE ONE AND ONE-HALF (1 & 1/2) TABLET BY MOUTH DAILY 8/22/19   Dmitri Ulloa MD  
bumetanide (BUMEX) 1 mg tablet Take 2 mg by mouth daily. Provider, Historical  
cloNIDine HCl (CATAPRES) 0.3 mg tablet Take 0.6 mg by mouth nightly. Provider, Historical  
metoprolol succinate (TOPROL-XL) 100 mg tablet TAKE TWO TABLETS BY MOUTH DAILY 7/12/19   Dmitri Ulloa MD  
doxazosin (CARDURA) 4 mg tablet TAKE ONE TABLET BY MOUTH ONCE NIGHTLY 6/14/19   Dmitri Ulloa MD  
busPIRone (BUSPAR) 10 mg tablet TAKE ONE TABLET BY MOUTH TWICE A DAY 5/24/19   Dmitri Ulloa MD  
potassium chloride SR (KLOR-CON 10) 10 mEq tablet TAKE ONE TABLET BY MOUTH DAILY 4/22/19   Mario Osei MD  
metolazone (ZAROXOLYN) 5 mg tablet Take 1 Tab by mouth daily as needed (take if develop increased LE edema, weight gain > 5 pounds. ). 4/10/14   Melia Muniz NP  
cholecalciferol, VITAMIN D3, (VITAMIN D3) 5,000 unit tab tablet Take 5,000 Units by mouth daily. Provider, Historical  
vitamin A 8,000 unit capsule Take 8,000 Units by mouth daily.     Provider, Historical  
 insulin lispro (HUMALOG) 100 unit/mL kwikpen 2 units with dinner for sugars over 200 1/3/14   Caroline Terry MD  
cyanocobalamin (VITAMIN B-12) 1,000 mcg sublingual tablet Take 1,000 mcg by mouth daily. Provider, Historical  
 
Allergies Allergen Reactions  Ace Inhibitors Cough  Amlodipine Swelling  Avapro [Irbesartan] Unable to Obtain  Bactrim [Sulfamethoxazole-Trimethoprim] Other (comments) Sore mouth  Captopril Cough  Carvedilol Diarrhea  Contrast Agent [Iodine] Itching [address]  Morphine Nausea and Vomiting and Swelling  Penicillins Hives  Pravachol [Pravastatin] Nausea Only  Seafood [Shellfish Containing Products] Hives  Singulair [Montelukast] Other (comments)  
  palpitations Family History Problem Relation Age of Onset  Cancer Mother  Heart Disease Mother  Alcohol abuse Father  Diabetes Brother  Hypertension Brother Social History Socioeconomic History  Marital status:  Spouse name: Not on file  Number of children: Not on file  Years of education: Not on file  Highest education level: Not on file Occupational History  Not on file Social Needs  Financial resource strain: Not on file  Food insecurity:  
  Worry: Not on file Inability: Not on file  Transportation needs:  
  Medical: Not on file Non-medical: Not on file Tobacco Use  Smoking status: Never Smoker  Smokeless tobacco: Never Used Substance and Sexual Activity  Alcohol use: Yes Comment: rare  Drug use: No  
 Sexual activity: Not on file Lifestyle  Physical activity:  
  Days per week: Not on file Minutes per session: Not on file  Stress: Not on file Relationships  Social connections:  
  Talks on phone: Not on file Gets together: Not on file Attends Voodoo service: Not on file Active member of club or organization: Not on file Attends meetings of clubs or organizations: Not on file Relationship status: Not on file  Intimate partner violence:  
  Fear of current or ex partner: Not on file Emotionally abused: Not on file Physically abused: Not on file Forced sexual activity: Not on file Other Topics Concern  Not on file Social History Narrative  Not on file Vitals:  
 11/19/19 1440 BP: 128/62 Pulse: 85 Resp: 16 Temp: 98.4 °F (36.9 °C) Review of Systems: 
 
Gen: No fever, chills, malaise, weight loss/gain. Heent: No headache, rhinorrhea, epistaxis, ear pain, hearing loss, sinus pain, neck pain/stiffness, sore throat. Heart: No chest pain, palpitations, CHOW, pnd, or orthopnea. Resp: No cough, hemoptysis, wheezing and shortness of breath. GI: No nausea, vomiting, diarrhea, constipation, melena or hematochezia. : No urinary obstruction, dysuria or hematuria. Derm: see below Musc/skeletal: no bone or joint complains. Vasc: No edema, cyanosis or claudication. Endo: No heat/cold intolerance, no polyuria,polydipsia or polyphagia. Neuro: No unilateral weakness, numbness, tingling. No seizures. Heme: No easy bruising or bleeding. Wound Description: 
Refer to nursing notes for measurements. Ulcer Debridement Left plantar foot wound Character of Ulcer: stable Indication for Debridement: Slough, Exudate, Abnormal wound edge and Abnormal Wound base Pre debridement measurements: 2.5 x 2 x 0.6 cm Risks of procedure were discussed with patient. Consent has been signed. Anesthetic: Lidocaine 4% topical cream  
 
Level of Debridement: Subctaneous Tissue , muscule Tissue and/or Material removed: Exudate, Fibrin/ Slough and Subcutaneous, Type of Tissue: Non- Viable Pre-debridement severity: Fat Layer Exposed Post debridement severity: Fat Layer exposed Instrument(s) used: Scalpel Bleeding controlled with: Pressure Estimated blood loss: Minimal 
 
Pre debridement measurements: 2.6x2. 1x0.7 cm Post procedural pain: mild Patient tolerated procedure well. Right  plantar foot wound Character of Ulcer: smaller Indication for Debridement: Slough, Exudate, Abnormal wound edge and Abnormal Wound base Pre debridement measurements: 1.3 cm x 1.4 cm x 0.3 cm Risks of procedure were discussed with patient. Consent has been signed. Anesthetic: Lidocaine 4% topical cream  
 
Level of Debridement: Subctaneous Tissue Tissue and/or Material removed: Exudate, Fibrin/ Slough and Subcutaneous Type of Tissue: Non- Viable Pre-debridement severity: Fat Layer Exposed Post debridement severity: Fat Layer exposed Instrument(s) used: Scalpel Bleeding controlled with: Pressure Estimated blood loss: Minimal 
 
Postdebridement measurements: 1.4 cm x 1.5 cm x 0.4 cm Post procedural pain: mild Patient tolerated procedure well. Infection: yes to left foot Edema: mild edema Lower Extremity Circulation Reviewed patient's recent ABIs. Shows decrease from when the  
 
Offloading: Offloading padding to shoes Assessment: 1. Diabetic plantar heel ulcer left foot with associated infection 2. Diabetic ulcer right foot-plantar 5th met base 3. Diabetic peripheral neuropathy 4. B/l cavus foot deformity Plan:  
-Patient seen and evaluated as noted above. 
-Wound debridement performed. 
-Discussed with patient that both foot wounds are stable  
-Endoform, hydrofera blue gauze dressing with window-paned foam to the left heel. 
-Continue offloading padding in both shoes to allow equal pressure distribution. New felt padding placed in the patient's left shoe. 
 -Patient to get diabetic shoes the first week of December. I discussed with the patient that some patients are able to be fitted for custom shoes once there ulcers are as superficial hers are.   In this situation it may be helpful to see if we can obtain them at this stage to get the ulcers offloaded. I suspect that the right foot ulcer will heal before the left. If we are able to get the right foot healed and into a diabetic shoe, this could given us more options to offload the left foot (such as resuming TCC) if the right foot is in the appropriate shoe. Unfortunately, previous TCC for the LLE has increased pressure to the RLE and worsened that ulceration. 
-As I will be away patient to follow-up next week with Dr. Liam Alexander and in 2 weeks with Dr. Meeta Moscoso.

## 2019-11-26 ENCOUNTER — HOSPITAL ENCOUNTER (OUTPATIENT)
Dept: WOUND CARE | Age: 59
Discharge: HOME | End: 2019-11-26
Payer: COMMERCIAL

## 2019-11-26 VITALS
HEART RATE: 76 BPM | SYSTOLIC BLOOD PRESSURE: 182 MMHG | DIASTOLIC BLOOD PRESSURE: 85 MMHG | TEMPERATURE: 97.9 F | RESPIRATION RATE: 18 BRPM

## 2019-11-26 PROCEDURE — 11042 DBRDMT SUBQ TIS 1ST 20SQCM/<: CPT

## 2019-11-26 PROCEDURE — 74011000250 HC RX REV CODE- 250: Performed by: OTOLARYNGOLOGY

## 2019-11-26 RX ADMIN — Medication: at 08:53

## 2019-11-26 NOTE — PROGRESS NOTES
[address] 
WOUND CARE PROGRESS NOTE Name:  Jewel Flower 
MR#:  [id number] :  1960 ACCOUNT #:  [de-identified] DATE OF SERVICE:  2019 SUBJECTIVE:  The patient returns for treatment of bilateral plantar diabetic foot ulcers E11.621, L97.422, L97.412. In the past, she had a right venous leg ulcer, which has healed. She had a left breast wound, which has also healed. Since last seen, she has had no problems and no changes in medications, allergies, or review of systems. OBJECTIVE: 
VITAL SIGNS:  Her temperature is 97.9, pulse 76, respirations 18, and blood pressure 182/85. WOUND EXAMINATION:  Examination of left breast shows that she has in fact healed and everything has epithelialized quite well. The wound to the right lateral plantar foot measures 1.0 x 0.9 x 0.3 cm. There is a rim of callus. There is a rim of epibole and slough over the wound. It is recommended that wound be debrided. I explained the risks and benefits. The patient understood and wished to proceed. Therefore, topical Xylocaine 4% gel was applied for 10 minutes. Using a 5-mm ring curette, the callus was pared down, the epibole was stripped out and the surface of wound was debrided down to nice healthy bleeding muscle. Hemostasis was achieved with gentle pressure until dry. Then, the entire surface of the wound was cauterized to create stimulation of healthy healing. Post-debridement dimensions were 0.9 x 1.4 x 0.2 cm. The wound of left plantar foot shows that it measures 2.2 x 1.9 x 0.5 cm. The medial half of the wound is filling in nicely, the lateral half is still deeper. It is recommended this wound be debrided of all devitalized tissue as well as stripping of the epibole circumferentially. I explained the risks and benefits. The patient understood and wished to proceed. Therefore, topical Xylocaine 4% gel was applied for 10 minutes.   Using a 5-mm ring curette, the edge of the wound were stripped out to get back to actively growing, bleeding skin edges. Hemostasis was achieved with firm pressure until dry. There was one bleeding site laterally and distally which was cauterized at the skin edge to create hemostasis. The surface of the wound was debrided of all devitalized tissue and then the entire surface wound was cauterized with silver nitrate to stimulate healthy healing. Post-debridement dimensions are 2.5 x 2.0 x 0.6 cm. Both wounds were then irrigated with saline. ASSESSMENT/PLAN:  We are going to continue Endoform and Hydrofera blue to be changed every other day due to the amount of drainage that is persisting. She is going to continue to wear her compression stockings. She will return in 10 days to see me during Dr. Amado Glez absence from the office. The patient will bring in or wear her new diabetic shoes, which should arrive by that time. All questions were answered. Her condition on discharge is stable. José Miguel Tian MD 
 
 
[id number] 
D:  11/26/2019 9:27 
T:  11/26/2019 11:07 JOB #:  [id number]

## 2019-11-26 NOTE — WOUND CARE
11/26/19 4872 [REMOVED] Wound Breast Left Final Assessment Date/Final Assessment Time: 11/26/19 0837  Date First Assessed/Time First Assessed: 06/07/19 0834   Location: Breast  Wound Location Orientation: Left  Wound Outcome: Healed Wound Length (cm) 0 cm Wound Width (cm) 0 cm Wound Depth (cm) 0 cm Wound Surface Area (cm^2) 0 cm^2 Wound Volume (cm^3) 0 cm^3 Condition of Base Other (comment) (Epithelialized) Condition of Edges Closed Drainage Amount None Wound Odor None Sandra-wound Assessment Intact Wound Foot Right;Plantar;Lateral 07/30/19 Date First Assessed/Time First Assessed: 07/30/19 1034   Present on Hospital Admission: Yes  Wound Approximate Age at First Assessment (Weeks): 1 weeks  Primary Wound Type: Diabetic Ulcer  Location: Foot  Wound Location Orientation: Right;Plantar;Late. .. Dressing Status Removed Dressing Type Gauze;Gauze wrap (arnie) Wound Length (cm) 1 cm Wound Width (cm) 0.9 cm Wound Depth (cm) 0.3 cm Wound Surface Area (cm^2) 0.9 cm^2 Wound Volume (cm^3) 0.27 cm^3 Condition of Base Pink Condition of Edges Open 
(Callous) Drainage Amount Moderate Drainage Color Serosanguinous Wound Odor None Sandra-wound Assessment Intact; Other (Comment) 
(Calloused) Cleansing and Cleansing Agents  Normal saline Dressing Changed Changed/New Dressing Type Applied Gauze 
(Endoform;HFBR;white foam to window pane) Wound Procedure Type Debridement- Surgical  
Procedure Time Out 7708 Consent Obtained  Yes Procedure Bleeding Moderate Procedure Hemostasis  Silver Nitrate Type of Tissue Removed  Devitalized Procedure Instrument  Curette Procedure Pain Scale Numeric 0/10 Debridement Procedure Performed by  
(Dr. Janet Suarez) Post-Procedure Length (cm) 0.9 cm Post-Procedure Width (cm) 1.4 cm Post-Procedure Depth (cm) 0.2 cm Post-Procedure Volume (cm^3) 0.25 cm^3 Post-Procedure Surface Area (cm^2) 1.26 cm^2 Procedure Tolerated Well Wound Foot Left;Plantar Date First Assessed/Time First Assessed: 06/21/19 0857   Present on Hospital Admission: Yes  Primary Wound Type: Diabetic  Location: Foot  Wound Location Orientation: Left;Plantar Dressing Status Removed Dressing Type Gauze;Gauze wrap (arnie) Wound Length (cm) 2.2 cm Wound Width (cm) 1.9 cm Wound Depth (cm) 0.5 cm Wound Surface Area (cm^2) 4.18 cm^2 Wound Volume (cm^3) 2.09 cm^3 Condition of Base Slough;Pink Condition of Edges Calloused Undermining (cm) 0.9 cm 
(0.9 cm from 9 to 10 o'clock) Direction of Undermining 5 o'clock;10 o'clock Drainage Amount Moderate Drainage Color Serosanguinous Wound Odor None Sandra-wound Assessment Intact; Blanchable erythema; Other (Comment) 
(Calloused.) Cleansing and Cleansing Agents  Normal saline Dressing Changed Changed/New Dressing Type Applied Gauze 
(Endoform;HFBR;white foam to window pane) Wound Procedure Type Debridement- Surgical  
Procedure Time Out 5354 Consent Obtained  Yes Procedure Bleeding Moderate Procedure Hemostasis  Silver Nitrate Type of Tissue Removed  Devitalized Procedure Instrument  Curette Procedure Pain Scale Numeric 0/10 Debridement Procedure Performed by Dr Tanna Garcia Post-Procedure Length (cm) 2.5 cm Post-Procedure Width (cm) 2 cm Post-Procedure Depth (cm) 0.6 cm Post-Procedure Volume (cm^3) 3 cm^3 Post-Procedure Surface Area (cm^2) 5 cm^2 Post Procedure Pain Scale Numeric 0/10 Procedure Tolerated Well Discharge Condition: Stable Pain: 0 Ambulatory Status: Walking Discharge Destination: Home Transportation: Car Accompanied by: Self Discharge instructions reviewed with Patient and copy or written instructions have been provided. All questions/concerns have been addressed at this time.

## 2019-11-26 NOTE — DISCHARGE INSTRUCTIONS
Discharge Instructions for  [facility]  [address] [facility] [place] [address] [facility]  Telephone: [phone]    NAME:  Roddy Houston OF BIRTH:  1960  MEDICAL RECORD NUMBER:  [id number]  DATE:  11/26/2019    Wound Cleansing:   Do not scrub or use excessive force. Cleanse wound prior to applying a clean dressing with:  [x] Normal Saline [] Keep Wound Dry in Shower    [] Wound Cleanser   [] Cleanse wound with Mild Soap & Water  [] May Shower at Discharge   [] Other:      Topical Treatments:  Do not apply lotions, creams, or ointments to wound bed unless directed. [] Apply moisturizing lotion to skin surrounding the wound prior to dressing change.  [] Apply antifungal ointment to skin surrounding the wound prior to dressing change.  [] Apply thin film of moisture barrier ointment to skin immediately around wound. [] Other:       Dressings:           Wound Location bilateral feet  [x] Apply Primary Dressing:       [] MediHoney Gel [] Alginate with Silver [] Alginate   [] Collagen [] Collagen with Silver   [] Santyl with Moisten saline gauze     [] Hydrocolloid   [] MediHoney Alginate [] Foam with Silver   [] Foam   [] Hydrofera Blue    [] Mepilex Border    [] Moisten with Saline [] Hydrogel [] Mepitel  [] Betadine moist gauze   [] Bactroban/Mupirocin [] Polysporin  [x] Other:  Endoform & Hydrofera blue ready  [] Pack wound loosely with  [] Iodoform   [] Plain Packing  [] Other   [x] Cover and Secure with:     [x] Gauze [x] Tejinder [] Kerlix   [] Ace Wrap [] Cover Roll Tape [] ABD [] Exudry    [] Other:    Avoid contact of tape with skin.   [x] Change dressing: [] Daily    [x] Every Other Day [] Three times per week   [] Once a week [] Do Not Change Dressing   [] Other:     Edema Control:  Apply: [x] Compression Stocking [x]Right Leg [x]Left Leg   [] Tubigrip []Right Leg Double Layer []Left Leg Double Layer       []Right Leg Single Layer []Left Leg Single Layer   [] SpandaGrip []Right Leg  []Left Leg      []Low compression 5-10 mm/Hg      []Medium compression 10-20 mm/Hg     []High compression  20-30 mm/Hg   every morning immediately when getting up should be applied to affected leg(s) from mid foot to knee making sure to cover the heel. Remove every night before going to bed. [x] Elevate leg(s) above the level of the heart when sitting. [x] Avoid prolonged standing in one place. [] Elevate arm/hand above the level of the heart []RightArm []LeftArm    Off-Loading:   [] Off-loading when [] walking  [] in bed [] sitting  [] Total non-weight bearing  [] Right Leg  [] Left Leg   [x] Assistive Device [] Walker [] Cane  [] Wheelchair  [] Crutches   [] Surgical shoe    [] Podus Boot(s)   [] Foam Boot(s)  [] Roll About    [] Cast Boot [] CROW Boot  [x] Other: Shoes with felt pads made by Dr. Felix Rodriguez    Dietary:  [x] Diet as tolerated: [x] Diabetic Diet: Low carb no sugar [x] No Added Salt:  [] Increase Protein: [] Other:   Activity:  [x] Activity as tolerated:  [] Patient has no activity restrictions     [] Strict Bedrest: [] Remain off Work:     [] May return to full duty work:                                   [] Return to work with restrictions:             Return Appointment:  [] Wound and dressing supply provider:   [] ECF or Home Healthcare:  [] Wound Assessment: [] Physician or NP scheduled for Wound Assessment:   [x] Return Appointment: With Dr. Tanna Garcia in  1 1/2  Week(s)  12/06/19[] Ordered tests:      Electronically signed Chris Howard RN on 11/26/2019 at [facility]: Should you experience any significant changes in your wound(s) or have questions about your wound care, please contact the [facility] Main at [address] [place] [address] 8:00 am - 4:30. If you need help with your wound outside these hours and cannot wait until we are again available, contact your PCP or go to the hospital emergency room.      PLEASE NOTE: IF YOU ARE UNABLE TO OBTAIN WOUND SUPPLIES, CONTINUE TO USE THE SUPPLIES YOU HAVE AVAILABLE UNTIL YOU ARE ABLE TO REACH US. IT IS MOST IMPORTANT TO KEEP THE WOUND COVERED AT ALL TIMES.      Physician Signature:_______________________    Date: ___________ Time:  ____________

## 2019-12-02 ENCOUNTER — HOSPITAL ENCOUNTER (OUTPATIENT)
Dept: WOUND CARE | Age: 59
Discharge: HOME | End: 2019-12-02
Payer: COMMERCIAL

## 2019-12-02 VITALS
SYSTOLIC BLOOD PRESSURE: 114 MMHG | TEMPERATURE: 96.2 F | HEART RATE: 77 BPM | DIASTOLIC BLOOD PRESSURE: 52 MMHG | RESPIRATION RATE: 16 BRPM

## 2019-12-02 DIAGNOSIS — L03.115 CELLULITIS OF RIGHT LOWER EXTREMITY: ICD-10-CM

## 2019-12-02 PROCEDURE — 11042 DBRDMT SUBQ TIS 1ST 20SQCM/<: CPT

## 2019-12-02 PROCEDURE — 74011000250 HC RX REV CODE- 250: Performed by: SURGERY

## 2019-12-02 RX ADMIN — Medication: at 09:12

## 2019-12-02 NOTE — WOUND CARE
12/02/19 0901 Wound Foot Right;Plantar;Lateral 07/30/19 Date First Assessed/Time First Assessed: 07/30/19 1034   Present on Hospital Admission: Yes  Wound Approximate Age at First Assessment (Weeks): 1 weeks  Primary Wound Type: Diabetic Ulcer  Location: Foot  Wound Location Orientation: Right;Plantar;Late. .. Dressing Status Removed Dressing Type Gauze;Gauze wrap (arnie); Special tape (comment) Wound Length (cm) 1 cm Wound Width (cm) 1.1 cm Wound Depth (cm) 0.1 cm Wound Surface Area (cm^2) 1.1 cm^2 Wound Volume (cm^3) 0.11 cm^3 Condition of Base Pink Condition of Edges Calloused; Open Drainage Amount Moderate Drainage Color Serosanguinous Wound Odor None Sandra-wound Assessment Intact Wound Foot Left;Plantar Date First Assessed/Time First Assessed: 06/21/19 0857   Present on Hospital Admission: Yes  Primary Wound Type: Diabetic  Location: Foot  Wound Location Orientation: Left;Plantar Dressing Status Removed Dressing Type Gauze;Gauze wrap (arnie) Wound Length (cm) 2.2 cm Wound Width (cm) 1.9 cm Wound Depth (cm) 0.5 cm Wound Surface Area (cm^2) 4.18 cm^2 Wound Volume (cm^3) 2.09 cm^3 Condition of Base Pink Condition of Edges Calloused Drainage Amount Moderate Drainage Color Serosanguinous Wound Odor None Sandra-wound Assessment Intact Cleansing and Cleansing Agents  Normal saline Visit Vitals /52 Pulse 77 Temp 96.2 °F (35.7 °C) Resp 16 LLE Peripheral Vascular Capillary Refill: Less than/equal to 3 seconds (12/02/19 0901) Color: Appropriate for race (12/02/19 0901) Temperature: Warm (12/02/19 0901) Sensation: Present (12/02/19 0901) Pedal Pulse: Palpable (12/02/19 0901) RLE Peripheral Vascular Capillary Refill: Less than/equal to 3 seconds (12/02/19 0901) Color: Appropriate for race (12/02/19 0901) Temperature: Warm (12/02/19 0901) Sensation: Present (12/02/19 0901) Pedal Pulse: Palpable (12/02/19 0901)

## 2019-12-02 NOTE — WOUND CARE
12/02/19 0930 Wound Foot Right;Plantar;Lateral 07/30/19 Date First Assessed/Time First Assessed: 07/30/19 1034   Present on Hospital Admission: Yes  Wound Approximate Age at First Assessment (Weeks): 1 weeks  Primary Wound Type: Diabetic Ulcer  Location: Foot  Wound Location Orientation: Right;Plantar;Late. .. Dressing Type Applied Collagens/cell matrix;Foam;Gauze;Gauze wrap (arnie); Special tape (comment) 
(endoform,HFBR, window pane with foam) Wound Procedure Type Debridement- Surgical  
Procedure Time Out 0930 Consent Obtained  Yes Procedure Bleeding Moderate Procedure Hemostasis  Silver Nitrate Type of Tissue Removed  Devitalized Procedure Instrument  Curette Procedure Pain Scale Numeric 0/10 Debridement Procedure Performed by Dr. Renny Lin Post-Procedure Length (cm) 1 cm Post-Procedure Width (cm) 1.1 cm Post-Procedure Depth (cm) 0.2 cm Post-Procedure Volume (cm^3) 0.22 cm^3 Post-Procedure Surface Area (cm^2) 1.1 cm^2 Wound Foot Left;Plantar Date First Assessed/Time First Assessed: 06/21/19 0857   Present on Hospital Admission: Yes  Primary Wound Type: Diabetic  Location: Foot  Wound Location Orientation: Left;Plantar Cleansing and Cleansing Agents  Normal saline Dressing Type Applied Collagens/cell matrix;Foam;Gauze;Gauze wrap (arnie); Special tape (comment) 
(endoform,HFBR, window pane with foam) Wound Procedure Type Debridement- Surgical  
Procedure Time Out 0930 Consent Obtained  Yes Procedure Bleeding Moderate Procedure Hemostasis  Silver Nitrate Type of Tissue Removed  Devitalized Procedure Instrument  Curette Procedure Pain Scale Numeric 0/10 Debridement Procedure Performed by Dr. Renny Lin Post-Procedure Length (cm) 2.2 cm Post-Procedure Width (cm) 1.9 cm Post-Procedure Depth (cm) 0.6 cm Post-Procedure Volume (cm^3) 2.51 cm^3 Post-Procedure Surface Area (cm^2) 4.18 cm^2 Post Procedure Pain Scale Numeric 0/10 Procedure Tolerated Well Discharge Condition: Stable Pain: 0 Ambulatory Status: Walking Discharge Destination: Home Transportation: Car Accompanied by: Self Discharge instructions reviewed with Patient and copy or written instructions have been provided. All questions/concerns have been addressed at this time.

## 2019-12-02 NOTE — PROGRESS NOTES
Patient presents to wound clinic for: Lloyd Pimentel is a 60 y.o. female who presents for wound care of bilateral plantar foot ulcers. Patient had been treated by Dr. Russella Fothergill for a wound of the left breast, and was referred to Dr Stacy Allan for further offloading and wound care recommendations of the bilateral heel ulcers. The ulcer of the plantar left heel occurred first and patient was placed in a heel offloading shoe. She then subsequently developed an ulceration of the plantar 5th met base of the right foot. She has been wearing her sneakers with her offloading pads. 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 Denies any issues with abx. Denies f/c/n/v/d. Denies any increased pain to foot. Was not able to make last week's appointment due to snow. Notes that she was able to get fitted for her diabetic shoes and that she is supposed to receive them the first week of December. 
  
Dressing:  Evangelina, Hydrofera blue, border foam. 
 
 
Pertinent Medical History: 
    
Past Medical History:  
Diagnosis Date  Acquired lymphedema    
  Right arm  Arrhythmia    
 Asthma    
 Atrial fibrillation [facility])    
  Dr. Chico Gunderson and Dr. Isabelle Talley Atrial flutter [facility])    
 Cancer [facility])    
  bilateral breast  
 Chronic kidney disease    
 Diabetes mellitus type II    
 Dizziness    
 Essential hypertension    
 Hyperlipidemia    
 Hypertension    
 Long term (current) use of anticoagulants    
 Morbid obesity ([address] 3/14/2012  Osteoarthritis    
 Pacemaker    
 Sick sinus syndrome [facility])    
  
     
Past Surgical History:  
Procedure Laterality Date  ABDOMEN SURGERY PROC UNLISTED      
  gastric bypass  GASTRIC BYPASS,OBESE<150CM SAW-EN-Y   7/08  
  guanakito FREED AFIB ABLATION      
 HX CARPAL TUNNEL RELEASE      
 HX CERVICAL FUSION   1994 [address]    [address] [facility] [address]  
  RIGHT MODIFIED RADICAL   
 HX MOHS PROCEDURES   1995   right  HX ORTHOPAEDIC      
  ight knee surgery  HX PACEMAKER      
 IR INSERT TUNL CVC W PORT OVER 5 YEARS      
 LAMINECTOMY,CERVICAL   1994  
 NEEDLE BIOPSY LIVER,W OTHR [address]   8.21.07  
 MO Gian BOLAÑOS TRABECULOPLASTY BY LASER SURGERY      
 US GUIDE ASP OVARIAN CYST ABD APPROACH   8.21.07  
  RIGHT   
  
       
Prior to Admission medications Medication Sig Start Date End Date Taking? Authorizing Provider  
warfarin (COUMADIN) 4 mg tablet Take 2 tablets on Nov 12 then 1 tablet on Monday and Thursday and  a half tablet the other 5 days. 11/12/19     Janie Holley MD  
insulin glargine (LANTUS U-100 INSULIN) 100 unit/mL injection Inject 20 units daily 11/10/19     Janie Holley MD  
hydrALAZINE (APRESOLINE) 100 mg tablet TAKE ONE TABLET BY MOUTH THREE TIMES A DAY 10/21/19     Janie Holley MD  
sertraline (ZOLOFT) 50 mg tablet TAKE ONE AND ONE-HALF (1 & 1/2) TABLET BY MOUTH DAILY 8/22/19     Janie Holley MD  
bumetanide (BUMEX) 1 mg tablet Take 2 mg by mouth daily.       Provider, Historical  
cloNIDine HCl (CATAPRES) 0.3 mg tablet Take 0.6 mg by mouth nightly.       Provider, Historical  
metoprolol succinate (TOPROL-XL) 100 mg tablet TAKE TWO TABLETS BY MOUTH DAILY 7/12/19     Janie Holley MD  
doxazosin (CARDURA) 4 mg tablet TAKE ONE TABLET BY MOUTH ONCE NIGHTLY 6/14/19     Janie Holley MD  
busPIRone (BUSPAR) 10 mg tablet TAKE ONE TABLET BY MOUTH TWICE A DAY 5/24/19     Janie Holley MD  
potassium chloride SR (KLOR-CON 10) 10 mEq tablet TAKE ONE TABLET BY MOUTH DAILY 4/22/19     Anjelica Christopher MD  
metolazone (ZAROXOLYN) 5 mg tablet Take 1 Tab by mouth daily as needed (take if develop increased LE edema, weight gain > 5 pounds. ). 4/10/14     Shikha Bertrand NP  
cholecalciferol, VITAMIN D3, (VITAMIN D3) 5,000 unit tab tablet Take 5,000 Units by mouth daily.       Provider, Historical  
 vitamin A 8,000 unit capsule Take 8,000 Units by mouth daily.       Provider, Historical  
insulin lispro (HUMALOG) 100 unit/mL kwikpen 2 units with dinner for sugars over 200 1/3/14     Greer Pittman MD  
cyanocobalamin (VITAMIN B-12) 1,000 mcg sublingual tablet Take 1,000 mcg by mouth daily.       Provider, Historical  
  
     
Allergies Allergen Reactions  Ace Inhibitors Cough  Amlodipine Swelling  Avapro [Irbesartan] Unable to Obtain  Bactrim [Sulfamethoxazole-Trimethoprim] Other (comments)  
    Sore mouth  Captopril Cough  Carvedilol Diarrhea  Contrast Agent [Iodine] Itching [address]  Morphine Nausea and Vomiting and Swelling  Penicillins Hives  Pravachol [Pravastatin] Nausea Only  Seafood [Shellfish Containing Products] Hives  Singulair [Montelukast] Other (comments)  
    palpitations  
  
     
Family History Problem Relation Age of Onset  Cancer Mother    
 Heart Disease Mother    
 Alcohol abuse Father    
 Diabetes Brother    
 Hypertension Brother    
  
Social History  
  
     
Socioeconomic History  Marital status:   
    Spouse name: Not on file  Number of children: Not on file  Years of education: Not on file  Highest education level: Not on file Occupational History  Not on file Social Needs  Financial resource strain: Not on file  Food insecurity:  
    Worry: Not on file  
    Inability: Not on file  Transportation needs:  
    Medical: Not on file  
    Non-medical: Not on file Tobacco Use  Smoking status: Never Smoker  Smokeless tobacco: Never Used Substance and Sexual Activity  Alcohol use: Yes  
    Comment: rare  Drug use: No  
 Sexual activity: Not on file Lifestyle  Physical activity:  
    Days per week: Not on file  
    Minutes per session: Not on file  Stress: Not on file Relationships  Social connections:  
    Talks on phone: Not on file     Gets together: Not on file  
    Attends Confucianist service: Not on file  
    Active member of club or organization: Not on file  
    Attends meetings of clubs or organizations: Not on file  
    Relationship status: Not on file  Intimate partner violence:  
    Fear of current or ex partner: Not on file  
    Emotionally abused: Not on file  
    Physically abused: Not on file  
    Forced sexual activity: Not on file Other Topics Concern  Not on file Social History Narrative  Not on file  
  
On exam, palpable 2+ DP pulses bilaterally. No leg edema. Right foot plantar ulcer:  1 x 1.1 x 0.1 cm, granulation with mild slough. Left heel ulcer:  2.2 x 1.9 x 0.5 cm with granulation, mild slough. After application of topical lidocaine gel, sharp excisional debridement of the right and left foot wounds was carried out using  5 mm ring curette. Slough and granulation tissue was excised down into the subcutaneous layer. Hemostasis was obtained by compression and silver nitrate. After debridement, the wound dimensions were right 1 x 1.1 x 0.2 cm and left 2.2 x 1.9 x 0.6 cm. Continue current dressing. Follow up with Dr Percy Watson next week. Bring diabetic shoes with her.  
 
Mason Figueredo MD

## 2019-12-02 NOTE — DISCHARGE INSTRUCTIONS
Discharge Instructions for  [place] [id number] [address], [address] [facility]  Telephone: [phone]    NAME:  Gerardo Tavarez OF BIRTH:  1960  MEDICAL RECORD NUMBER:  [id number]  DATE:  12/2/2019    Wound Cleansing:   Do not scrub or use excessive force. Cleanse wound prior to applying a clean dressing with:  [x] Normal Saline [] Keep Wound Dry in Shower    [] Wound Cleanser   [] Cleanse wound with Mild Soap & Water  [] May Shower at Discharge   [] Other:       Dressings:           Wound Location - Bilateral Feet   [x] Apply Primary Dressing:       [] MediHoney Gel [] Alginate with Silver [] Alginate   [x] Collagen (Endoform) [] Collagen with Silver   [] Santyl with Moisten saline gauze     [] Hydrocolloid   [] MediHoney Alginate [] Foam with Silver   [] Foam   [x] Hydrofera Blue  Ready [] Mepilex Border    [] Moisten with Saline [] Hydrogel [] Mepitel  [] Betadine moist gauze   [] Bactroban/Mupirocin [] Polysporin  [x] Other:  \"Windowpane\" wounds using Mepilex white foam.    [x] Cover and Secure with:     [x] Gauze [x] Tejinder [] Kerlix   [] Ace Wrap [] Cover Roll Tape [] ABD [] Exudry    [] Other:    Avoid contact of tape with skin.   [] Change dressing: [] Daily    [x] Every Other Day [] Three times per week   [] Once a week [] Do Not Change Dressing   [] Other:      Dietary:  [] Diet as tolerated: [x] Diabetic Diet: Low carb no sugar [] No Added Salt:  [] Increase Protein: [] Other:               Return Appointment:  [] Wound and dressing supply provider:   [] ECF or Home Healthcare:  [] Wound Assessment: [] Physician or NP scheduled for Wound Assessment:   [x] Return Appointment: With Dr. Mary Jane Najera  in [facility])  [] Ordered tests:       [address] [place] [address] Information: Should you experience any significant changes in your wound(s) or have questions about your wound care, please contact the [address] [place] [address] at [address] [place] [address] 8:00 am - 4: 30. If you need help with your wound outside these hours and cannot wait until we are again available, contact your PCP or go to the hospital emergency room. PLEASE NOTE: IF YOU ARE UNABLE TO OBTAIN WOUND SUPPLIES, CONTINUE TO USE THE SUPPLIES YOU HAVE AVAILABLE UNTIL YOU ARE ABLE TO REACH US. IT IS MOST IMPORTANT TO KEEP THE WOUND COVERED AT ALL TIMES.

## 2019-12-06 ENCOUNTER — HOSPITAL ENCOUNTER (INPATIENT)
Age: 59
LOS: 3 days | Discharge: HOME | DRG: 603 | End: 2019-12-09
Attending: EMERGENCY MEDICINE | Admitting: INTERNAL MEDICINE
Payer: COMMERCIAL

## 2019-12-06 ENCOUNTER — APPOINTMENT (OUTPATIENT)
Dept: ULTRASOUND IMAGING | Age: 59
DRG: 603 | End: 2019-12-06
Attending: EMERGENCY MEDICINE
Payer: COMMERCIAL

## 2019-12-06 ENCOUNTER — HOSPITAL ENCOUNTER (OUTPATIENT)
Dept: WOUND CARE | Age: 59
Discharge: HOME | End: 2019-12-06
Payer: COMMERCIAL

## 2019-12-06 VITALS
TEMPERATURE: 98.8 F | HEART RATE: 66 BPM | DIASTOLIC BLOOD PRESSURE: 69 MMHG | RESPIRATION RATE: 18 BRPM | SYSTOLIC BLOOD PRESSURE: 150 MMHG

## 2019-12-06 DIAGNOSIS — R79.1 SUPRATHERAPEUTIC INR: ICD-10-CM

## 2019-12-06 DIAGNOSIS — L03.115 CELLULITIS OF RIGHT LOWER EXTREMITY: Primary | ICD-10-CM

## 2019-12-06 DIAGNOSIS — I48.0 PAF (PAROXYSMAL ATRIAL FIBRILLATION) (HCC): ICD-10-CM

## 2019-12-06 PROBLEM — L03.90 CELLULITIS: Status: ACTIVE | Noted: 2019-12-06

## 2019-12-06 LAB
ANION GAP SERPL CALC-SCNC: 6 MMOL/L (ref 5–15)
BASOPHILS # BLD: 0.1 K/UL (ref 0–0.1)
BASOPHILS NFR BLD: 1 % (ref 0–1)
BUN SERPL-MCNC: 34 MG/DL (ref 6–20)
BUN/CREAT SERPL: 16 (ref 12–20)
CALCIUM SERPL-MCNC: 8.8 MG/DL (ref 8.5–10.1)
CHLORIDE SERPL-SCNC: 106 MMOL/L (ref 97–108)
CO2 SERPL-SCNC: 25 MMOL/L (ref 21–32)
CREAT SERPL-MCNC: 2.19 MG/DL (ref 0.55–1.02)
DIFFERENTIAL METHOD BLD: ABNORMAL
EOSINOPHIL # BLD: 0.2 K/UL (ref 0–0.4)
EOSINOPHIL NFR BLD: 2 % (ref 0–7)
ERYTHROCYTE [DISTWIDTH] IN BLOOD BY AUTOMATED COUNT: 13.4 % (ref 11.5–14.5)
GLUCOSE BLD STRIP.AUTO-MCNC: 145 MG/DL (ref 65–100)
GLUCOSE SERPL-MCNC: 141 MG/DL (ref 65–100)
HCT VFR BLD AUTO: 34.7 % (ref 35–47)
HGB BLD-MCNC: 10.7 G/DL (ref 11.5–16)
IMM GRANULOCYTES # BLD AUTO: 0.1 K/UL (ref 0–0.04)
IMM GRANULOCYTES NFR BLD AUTO: 1 % (ref 0–0.5)
INR PPP: 12.1 (ref 0.9–1.1)
LACTATE SERPL-SCNC: 0.9 MMOL/L (ref 0.4–2)
LYMPHOCYTES # BLD: 1 K/UL (ref 0.8–3.5)
LYMPHOCYTES NFR BLD: 10 % (ref 12–49)
MAGNESIUM SERPL-MCNC: 2.4 MG/DL (ref 1.6–2.4)
MCH RBC QN AUTO: 29.1 PG (ref 26–34)
MCHC RBC AUTO-ENTMCNC: 30.8 G/DL (ref 30–36.5)
MCV RBC AUTO: 94.3 FL (ref 80–99)
MONOCYTES # BLD: 0.9 K/UL (ref 0–1)
MONOCYTES NFR BLD: 9 % (ref 5–13)
NEUTS SEG # BLD: 8.2 K/UL (ref 1.8–8)
NEUTS SEG NFR BLD: 77 % (ref 32–75)
NRBC # BLD: 0 K/UL (ref 0–0.01)
NRBC BLD-RTO: 0 PER 100 WBC
PLATELET # BLD AUTO: 442 K/UL (ref 150–400)
PMV BLD AUTO: 10.6 FL (ref 8.9–12.9)
POTASSIUM SERPL-SCNC: 4.2 MMOL/L (ref 3.5–5.1)
PROTHROMBIN TIME: 108 SEC (ref 9–11.1)
RBC # BLD AUTO: 3.68 M/UL (ref 3.8–5.2)
SERVICE CMNT-IMP: ABNORMAL
SODIUM SERPL-SCNC: 137 MMOL/L (ref 136–145)
WBC # BLD AUTO: 10.4 K/UL (ref 3.6–11)

## 2019-12-06 PROCEDURE — 85610 PROTHROMBIN TIME: CPT

## 2019-12-06 PROCEDURE — 36415 COLL VENOUS BLD VENIPUNCTURE: CPT

## 2019-12-06 PROCEDURE — 74011250636 HC RX REV CODE- 250/636: Performed by: EMERGENCY MEDICINE

## 2019-12-06 PROCEDURE — 85025 COMPLETE CBC W/AUTO DIFF WBC: CPT

## 2019-12-06 PROCEDURE — 96365 THER/PROPH/DIAG IV INF INIT: CPT

## 2019-12-06 PROCEDURE — 83605 ASSAY OF LACTIC ACID: CPT

## 2019-12-06 PROCEDURE — 94761 N-INVAS EAR/PLS OXIMETRY MLT: CPT

## 2019-12-06 PROCEDURE — 74011250637 HC RX REV CODE- 250/637: Performed by: EMERGENCY MEDICINE

## 2019-12-06 PROCEDURE — 74011250637 HC RX REV CODE- 250/637: Performed by: INTERNAL MEDICINE

## 2019-12-06 PROCEDURE — 99284 EMERGENCY DEPT VISIT MOD MDM: CPT

## 2019-12-06 PROCEDURE — 74011250636 HC RX REV CODE- 250/636: Performed by: INTERNAL MEDICINE

## 2019-12-06 PROCEDURE — 82962 GLUCOSE BLOOD TEST: CPT

## 2019-12-06 PROCEDURE — 74011000258 HC RX REV CODE- 258: Performed by: EMERGENCY MEDICINE

## 2019-12-06 PROCEDURE — 80048 BASIC METABOLIC PNL TOTAL CA: CPT

## 2019-12-06 PROCEDURE — 93971 EXTREMITY STUDY: CPT

## 2019-12-06 PROCEDURE — 99213 OFFICE O/P EST LOW 20 MIN: CPT

## 2019-12-06 PROCEDURE — 87040 BLOOD CULTURE FOR BACTERIA: CPT

## 2019-12-06 PROCEDURE — 65270000029 HC RM PRIVATE

## 2019-12-06 PROCEDURE — 83735 ASSAY OF MAGNESIUM: CPT

## 2019-12-06 PROCEDURE — 74011000258 HC RX REV CODE- 258: Performed by: INTERNAL MEDICINE

## 2019-12-06 RX ORDER — BISACODYL 5 MG
5 TABLET, DELAYED RELEASE (ENTERIC COATED) ORAL DAILY PRN
Status: DISCONTINUED | OUTPATIENT
Start: 2019-12-06 | End: 2019-12-09 | Stop reason: HOSPADM

## 2019-12-06 RX ORDER — BUSPIRONE HYDROCHLORIDE 5 MG/1
10 TABLET ORAL 2 TIMES DAILY
Status: DISCONTINUED | OUTPATIENT
Start: 2019-12-06 | End: 2019-12-09 | Stop reason: HOSPADM

## 2019-12-06 RX ORDER — LANOLIN ALCOHOL/MO/W.PET/CERES
1000 CREAM (GRAM) TOPICAL DAILY
Status: DISCONTINUED | OUTPATIENT
Start: 2019-12-07 | End: 2019-12-09 | Stop reason: HOSPADM

## 2019-12-06 RX ORDER — CLONIDINE HYDROCHLORIDE 0.1 MG/1
0.6 TABLET ORAL
Status: DISCONTINUED | OUTPATIENT
Start: 2019-12-06 | End: 2019-12-09 | Stop reason: HOSPADM

## 2019-12-06 RX ORDER — HYDRALAZINE HYDROCHLORIDE 50 MG/1
100 TABLET, FILM COATED ORAL EVERY 8 HOURS
Status: DISCONTINUED | OUTPATIENT
Start: 2019-12-06 | End: 2019-12-09 | Stop reason: HOSPADM

## 2019-12-06 RX ORDER — SERTRALINE HYDROCHLORIDE 50 MG/1
75 TABLET, FILM COATED ORAL DAILY
Status: DISCONTINUED | OUTPATIENT
Start: 2019-12-07 | End: 2019-12-09 | Stop reason: HOSPADM

## 2019-12-06 RX ORDER — MAGNESIUM SULFATE 100 %
4 CRYSTALS MISCELLANEOUS AS NEEDED
Status: DISCONTINUED | OUTPATIENT
Start: 2019-12-06 | End: 2019-12-09 | Stop reason: HOSPADM

## 2019-12-06 RX ORDER — SODIUM CHLORIDE 0.9 % (FLUSH) 0.9 %
5-40 SYRINGE (ML) INJECTION AS NEEDED
Status: DISCONTINUED | OUTPATIENT
Start: 2019-12-06 | End: 2019-12-09 | Stop reason: HOSPADM

## 2019-12-06 RX ORDER — SODIUM CHLORIDE 9 MG/ML
50 INJECTION, SOLUTION INTRAVENOUS CONTINUOUS
Status: DISCONTINUED | OUTPATIENT
Start: 2019-12-06 | End: 2019-12-07

## 2019-12-06 RX ORDER — METOPROLOL SUCCINATE 50 MG/1
200 TABLET, EXTENDED RELEASE ORAL DAILY
Status: DISCONTINUED | OUTPATIENT
Start: 2019-12-07 | End: 2019-12-09 | Stop reason: HOSPADM

## 2019-12-06 RX ORDER — NALOXONE HYDROCHLORIDE 0.4 MG/ML
0.4 INJECTION, SOLUTION INTRAMUSCULAR; INTRAVENOUS; SUBCUTANEOUS AS NEEDED
Status: DISCONTINUED | OUTPATIENT
Start: 2019-12-06 | End: 2019-12-09 | Stop reason: HOSPADM

## 2019-12-06 RX ORDER — HYDROCODONE BITARTRATE AND ACETAMINOPHEN 5; 325 MG/1; MG/1
1 TABLET ORAL
Status: DISCONTINUED | OUTPATIENT
Start: 2019-12-06 | End: 2019-12-09 | Stop reason: HOSPADM

## 2019-12-06 RX ORDER — INSULIN LISPRO 100 [IU]/ML
INJECTION, SOLUTION INTRAVENOUS; SUBCUTANEOUS
Status: DISCONTINUED | OUTPATIENT
Start: 2019-12-06 | End: 2019-12-09 | Stop reason: HOSPADM

## 2019-12-06 RX ORDER — PHYTONADIONE 5 MG/1
10 TABLET ORAL
Status: DISCONTINUED | OUTPATIENT
Start: 2019-12-06 | End: 2019-12-06

## 2019-12-06 RX ORDER — POTASSIUM CHLORIDE 750 MG/1
10 TABLET, FILM COATED, EXTENDED RELEASE ORAL DAILY
Status: DISCONTINUED | OUTPATIENT
Start: 2019-12-07 | End: 2019-12-09 | Stop reason: HOSPADM

## 2019-12-06 RX ORDER — SODIUM CHLORIDE 0.9 % (FLUSH) 0.9 %
5-40 SYRINGE (ML) INJECTION EVERY 8 HOURS
Status: DISCONTINUED | OUTPATIENT
Start: 2019-12-06 | End: 2019-12-09 | Stop reason: HOSPADM

## 2019-12-06 RX ORDER — ACETAMINOPHEN 325 MG/1
650 TABLET ORAL
Status: DISCONTINUED | OUTPATIENT
Start: 2019-12-06 | End: 2019-12-09 | Stop reason: HOSPADM

## 2019-12-06 RX ORDER — ONDANSETRON 4 MG/1
8 TABLET, ORALLY DISINTEGRATING ORAL
Status: COMPLETED | OUTPATIENT
Start: 2019-12-06 | End: 2019-12-06

## 2019-12-06 RX ORDER — DOXAZOSIN 2 MG/1
4 TABLET ORAL
Status: DISCONTINUED | OUTPATIENT
Start: 2019-12-06 | End: 2019-12-09 | Stop reason: HOSPADM

## 2019-12-06 RX ORDER — INSULIN GLARGINE 100 [IU]/ML
20 INJECTION, SOLUTION SUBCUTANEOUS DAILY
Status: DISCONTINUED | OUTPATIENT
Start: 2019-12-07 | End: 2019-12-09 | Stop reason: HOSPADM

## 2019-12-06 RX ORDER — HYDROCODONE BITARTRATE AND ACETAMINOPHEN 5; 325 MG/1; MG/1
1 TABLET ORAL ONCE
Status: COMPLETED | OUTPATIENT
Start: 2019-12-06 | End: 2019-12-06

## 2019-12-06 RX ORDER — MELATONIN
5000 DAILY
Status: DISCONTINUED | OUTPATIENT
Start: 2019-12-07 | End: 2019-12-09 | Stop reason: HOSPADM

## 2019-12-06 RX ORDER — DEXTROSE MONOHYDRATE 25 G/50ML
25-50 INJECTION, SOLUTION INTRAVENOUS AS NEEDED
Status: DISCONTINUED | OUTPATIENT
Start: 2019-12-06 | End: 2019-12-09 | Stop reason: HOSPADM

## 2019-12-06 RX ORDER — METRONIDAZOLE 500 MG/100ML
500 INJECTION, SOLUTION INTRAVENOUS
Status: COMPLETED | OUTPATIENT
Start: 2019-12-06 | End: 2019-12-06

## 2019-12-06 RX ORDER — ONDANSETRON 2 MG/ML
4 INJECTION INTRAMUSCULAR; INTRAVENOUS
Status: DISCONTINUED | OUTPATIENT
Start: 2019-12-06 | End: 2019-12-09 | Stop reason: HOSPADM

## 2019-12-06 RX ADMIN — Medication 10 ML: at 22:11

## 2019-12-06 RX ADMIN — SODIUM CHLORIDE 50 ML/HR: 900 INJECTION, SOLUTION INTRAVENOUS at 22:03

## 2019-12-06 RX ADMIN — CEFEPIME HYDROCHLORIDE 2 G: 2 INJECTION, POWDER, FOR SOLUTION INTRAVENOUS at 21:58

## 2019-12-06 RX ADMIN — DOXAZOSIN 4 MG: 2 TABLET ORAL at 21:57

## 2019-12-06 RX ADMIN — HYDROCODONE BITARTRATE AND ACETAMINOPHEN 1 TABLET: 5; 325 TABLET ORAL at 17:41

## 2019-12-06 RX ADMIN — BUSPIRONE HYDROCHLORIDE 10 MG: 5 TABLET ORAL at 21:58

## 2019-12-06 RX ADMIN — CLONIDINE HYDROCHLORIDE 0.6 MG: 0.1 TABLET ORAL at 21:57

## 2019-12-06 RX ADMIN — CEFTRIAXONE 1 G: 1 INJECTION, POWDER, FOR SOLUTION INTRAMUSCULAR; INTRAVENOUS at 19:29

## 2019-12-06 RX ADMIN — METRONIDAZOLE 500 MG: 500 INJECTION, SOLUTION INTRAVENOUS at 20:11

## 2019-12-06 RX ADMIN — HYDRALAZINE HYDROCHLORIDE 100 MG: 50 TABLET, FILM COATED ORAL at 21:57

## 2019-12-06 RX ADMIN — PHYTONADIONE 10 MG: 10 INJECTION, EMULSION INTRAMUSCULAR; INTRAVENOUS; SUBCUTANEOUS at 20:11

## 2019-12-06 RX ADMIN — ONDANSETRON 8 MG: 4 TABLET, ORALLY DISINTEGRATING ORAL at 17:42

## 2019-12-06 NOTE — PROGRESS NOTES
[address] 
WOUND CARE PROGRESS NOTE Name:  Jaylene Her 
MR#:  [id number] :  1960 ACCOUNT #:  [de-identified] DATE OF SERVICE:  2019 SUBJECTIVE:  The patient called today asking to be seen since she is having significant pain in her right leg. This is the same Cristian Gemma has had cellulitis on multiple occasions. She presented today walking quite gingerly with significant pain in her right leg. She is also weak due to pain in her legs and generalized weakness. OBJECTIVE: 
VITAL SIGNS:  Her temperature is 98.8, pulse 66, respirations 18, and blood pressure 150/69. WOUND EXAMINATION:  The patient has been taking clindamycin 300 mg t.i.d. over the past week, which she had taken as a refill of a previous prescription. In spite of that, she has gotten worse over the week. Examination shows persistent wounds on the right plantar and left plantar feet, but of greater significance is the fact that she is red and tender on the right lateral distal leg and in her proximal right thigh. This is the exact same location where she had cellulitis treated by Dr. Ariella Napoles in 2019, which did respond at that time to clindamycin. ASSESSMENT/PLAN:  I explained to the patient that she again has cellulitis. She has gotten worse in spite of being on oral antibiotics for a week and therefore, it is imperative that she go to emergency room. She understands and agrees. I called and spoke to the physicians there. They are awaiting her arrival and she will be evaluated and treated appropriately. She will call after her discharge to schedule followup visit here in the [address] [place] [address]. All questions were answered. Her condition on discharge is stable. MD SHEEBA [email or web address] 
D:  2019 15:50 
T:  2019 16:42 JOB #:  [id number]

## 2019-12-06 NOTE — DISCHARGE INSTRUCTIONS
Discharge Instructions for  [place] [id number] [facility], [address] [facility]  Telephone: [phone]    NAME:  Rashmi Watt OF BIRTH:  1960  MEDICAL RECORD NUMBER:  [id number]  DATE:  12/6/2019    Wound Cleansing:   Do not scrub or use excessive force. Cleanse wound prior to applying a clean dressing with:  [x] Normal Saline [] Keep Wound Dry in Shower    [] Wound Cleanser   [] Cleanse wound with Mild Soap & Water  [] May Shower at Discharge   [] Other:      Topical Treatments:  Do not apply lotions, creams, or ointments to wound bed unless directed. [] Apply moisturizing lotion to skin surrounding the wound prior to dressing change.  [] Apply antifungal ointment to skin surrounding the wound prior to dressing change.  [] Apply thin film of moisture barrier ointment to skin immediately around wound. [] Other:       Dressings:           Wound Location Bilateral foot wound  [x] Apply Primary Dressing:       [] MediHoney Gel [x] Alginate with Silver [] Alginate   [] Collagen [] Collagen with Silver   [] Santyl with Moisten saline gauze     [] Hydrocolloid   [] MediHoney Alginate [] Foam with Silver   [] Foam   [] Hydrofera Blue    [] Mepilex Border    [] Moisten with Saline [] Hydrogel [] Mepitel  [] Betadine moist gauze   [] Bactroban/Mupirocin [] Polysporin  [] Other:    [] Pack wound loosely with  [] Iodoform   [] Plain Packing  [] Other   [x] Cover and Secure with:     [] Gauze [x] Tejinder [] Kerlix   [] Ace Wrap [] Cover Roll Tape [] ABD [] Exudry    [x] Other: Exudry Avoid contact of tape with skin.   [x] Change dressing: [] Daily    [] Every Other Day [x] Three times per week   [] Once a week [] Do Not Change Dressing   [] Other:     Edema Control:  Apply: [] Compression Stocking []Right Leg []Left Leg   [] Tubigrip []Right Leg Double Layer []Left Leg Double Layer       []Right Leg Single Layer []Left Leg Single Layer   [] SpandaGrip []Right Leg  []Left Leg      []Low compression 5-10 mm/Hg      []Medium compression 10-20 mm/Hg     []High compression  20-30 mm/Hg   every morning immediately when getting up should be applied to affected leg(s) from mid foot to knee making sure to cover the heel. Remove every night before going to bed. [x] Elevate leg(s) above the level of the heart when sitting. [x] Avoid prolonged standing in one place. [] Elevate arm/hand above the level of the heart []RightArm []LeftArm    Dietary:  [x] Diet as tolerated: [x] Diabetic Diet: Low carb no sugar [] No Added Salt:  [x] Increase Protein: [] Other:   Activity:  [x] Activity as tolerated:  [] Patient has no activity restrictions     [] Strict Bedrest: [] Remain off Work:     [] May return to full duty work:                                   [] Return to work with restrictions:             Return Appointment:  [] Wound and dressing supply provider:   [] ECF or Home Healthcare:  [] Wound Assessment: [] Physician or NP scheduled for Wound Assessment:   [x] Return Appointment: With to be scheduled after hospital[] Ordered tests:      Electronically signed Marnie Nino RN on 12/6/2019 at 3:17 PM     Marisela Cruz 281: Should you experience any significant changes in your wound(s) or have questions about your wound care, please contact the [facility] Main at [address] [place] [address] 8:00 am - 4:30. If you need help with your wound outside these hours and cannot wait until we are again available, contact your PCP or go to the hospital emergency room. PLEASE NOTE: IF YOU ARE UNABLE TO OBTAIN WOUND SUPPLIES, CONTINUE TO USE THE SUPPLIES YOU HAVE AVAILABLE UNTIL YOU ARE ABLE TO REACH US. IT IS MOST IMPORTANT TO KEEP THE WOUND COVERED AT ALL TIMES.      Physician Signature:_______________________    Date: ___________ Time:  ____________

## 2019-12-06 NOTE — WOUND CARE
Clinic Level of Care Assessment NAME:  Myrna Barrett YOB: 1960 GENDER: female MEDICAL RECORD NUMBER:  [id number] DATE:  12/6/2019 Wound Count Document in [facility] [place] Number of Wounds Assessed Points No Wounds/Ulcers []   0 Less than Three Wounds/Ulcers [x]   1  
3-6 Wounds/Ulcers []   2 Greater than 6 Wounds/Ulcers []   3 Ambulation Status Document in Coord/SHILPI/Mobility tab Status Definition Points Independent Independently able to ambulate. Fully able (without any assistance) to get on/off exam table/chair. []   0 Minimal Physical Assistance Requires physical assistance of one person to ambulate and/or position patient to be examined. Includes necessary physical assistance to position lower extremities on/off stool. []   1 Moderate Physical Assistance Requires at least one staff member to physically assist patient in ambulating into treatment room, and on/off exam table. [x]   2 Full Assistance Requires assistance of at least two staff members to transfer patient into treatment room and/or on/off exam table/chair. \"Total Transfer\". []   3 Dressing Complexity Document in [facility] and Write Appropriate Order Complexity Definition Points No Dressing  []   0 Simple Minimal, simple dressing. i.e. Band-aid, gauze, simple wrap. []   1 Intermediate Moderately complicated requiring licensed personnel to apply i.e. collagen matrix, ointments, gels, alginates. [x]   2 Complex Complicated requiring licensed personnel to apply dressings 6 or more wounds. []   3 Teaching Effort Document in Education Tab Effort Definition Points No Teaching  []   0 Simple Reinforce two or less topics. Document in Education navigator. [x]   1 Intermediate Reinforce three to five topics and/or one additional  
new topic. Document in Education navigator. []   2 Complex Teach more than one new topic. New patient information  
packet reviewed and/or reinforce more than three topics. Document in Education navigator. HBO initial instruction. []   3 Patient Assessment and Planning Planning Definition Points Simple Multiple System Simple: Simple follow-up with routine assessment and planning. If Discharged, instructions and long term/follow-up care given to patient/caregiver. Discharged, instructions and/or After Visit Summary given to patient/caregiver and instructions completed. []   1 Intermediate Multiple System Intermediate: Contact with outside resources; i.e. Telephone calls to home health, [facility]. May include filling out forms and writing letters, arranging transportation, communication with insurance , vendors, etc.  Discharged, instructions and/or After Visit Summary given to patient/caregiver and instructions completed. [x]   2 Complex Multiple System Complex: Full, comprehensive assessment and planning. Follow the entire navigator under Wound Visit charting filling out each tab which includes OP Adm Database Screening, Education and CarePlan  HBO risk assessment completed. Discharged, instructions and/or After Visit Summary given to patient/caregiver and instructions completed. []   3 Is this the Patient's First Visit to the [address] [place] [address] Road No 
 
 
Is this Patient Established @ [facility] 
Yes Clinical Level of Care Points  0-2  Level 1 [] Points  3-5  Level 2 [] Points  6-9  Level 3 [x] Points  10-12  Level 4 [] Points  13-15  Level 5 [] Electronically signed by Shayne Sanabria RN on 12/6/2019 at 4:55 PM

## 2019-12-06 NOTE — ED PROVIDER NOTES
EMERGENCY DEPARTMENT HISTORY AND PHYSICAL EXAM 
     
 
Date: 12/6/2019 Patient Name: Radha Woods History of Presenting Illness Chief Complaint Patient presents with  Wound Check  
  sent from Dr. Kallie Boggs at wound care for check of cellulitis in right thigh and calf. currently being treated for diabetic ulcers in bilateral feet. reports pain and swelling since Wednesday History Provided By: Patient and Patient's  HPI: Radha Woods is a 60 y.o. female, pmhx hypertension, high cholesterol, diabetes, atrial fibrillation and sick sinus syndrome with a pacemaker, CKD, who presents ambulatory from wound care clinic sent by Dr. Kallie Boggs to the ED c/o right lower extremity redness. Patient is being treated by wound clinic for bilateral feet diabetic ulcers. She states she has been on clindamycin for a week for some right upper thigh redness and was sent by Dr. Kallie Boggs for further evaluation given that her symptoms are worsening on the antibiotic. She explains over the past week she has had redness in the right posterior thigh with tenderness and swelling. She states this is happened before and she is been evaluated for DVT and vascular studies which were normal.  She notes however, since Wednesday, this episode is causing her a lot of pain which is abnormal as in the past she did not have pain with the symptoms. Patient denies any chest pain or fevers, fevers, chills, nausea, vomiting, diarrhea and states she is eating and drinking normally. PCP: Sushil Mccain MD 
 
Allergies: Long list 
Social Hx: -tobacco, +EtOH, -Illicit Drugs There are no other complaints, changes, or physical findings at this time. Current Facility-Administered Medications Medication Dose Route Frequency Provider Last Rate Last Dose  vancomycin (VANCOCIN) 2,000 mg in 0.9% sodium chloride 500 mL IVPB  2 g IntraVENous ONCE TermeerTamara  mL/hr at 12/07/19 0003 2,000 mg at 12/07/19 0003  acetaminophen (TYLENOL) tablet 650 mg  650 mg Oral Q6H PRN Marisol Bermudez MD      
 vancomycin (VANCOCIN) 1,250 mg in 0.9% sodium chloride 250 mL IVPB  1,250 mg IntraVENous Q24H Marisol Bermudez MD      
 sodium chloride (NS) flush 5-40 mL  5-40 mL IntraVENous Q8H Evy Espinal MD   10 mL at 12/06/19 2211  
 sodium chloride (NS) flush 5-40 mL  5-40 mL IntraVENous PRN Evy Espinal MD      
 ondansetron [facility]) injection 4 mg  4 mg IntraVENous Q4H PRN Evy Espinal MD      
 bisacodyl (DULCOLAX) tablet 5 mg  5 mg Oral DAILY PRN Evy Espinal MD      
 HYDROcodone-acetaminophen [facility]) 5-325 mg per tablet 1 Tab  1 Tab Oral Q4H PRN Evy Espinal MD      
 [facility]) injection 0.4 mg  0.4 mg IntraVENous PRN Evy Espinal MD      
 busPIRone (BUSPAR) tablet 10 mg  10 mg Oral BID Evy Espinal MD   10 mg at 12/06/19 2158  cholecalciferol (VITAMIN D3) (1000 Units /25 mcg) tablet 5 Tab  5,000 Units Oral DAILY Evy Espinal MD      
 cloNIDine HCl (CATAPRES) tablet 0.6 mg  0.6 mg Oral QHS Evy Espinal MD   0.6 mg at 12/06/19 2157  cyanocobalamin (VITAMIN B12) tablet 1,000 mcg  1,000 mcg Oral DAILY Evy Espinal MD      
 doxazosin (CARDURA) tablet 4 mg  4 mg Oral QHS Evy Espinal MD   4 mg at 12/06/19 2157  hydrALAZINE (APRESOLINE) tablet 100 mg  100 mg Oral Q8H vEy Espinal MD   100 mg at 12/06/19 2157  insulin glargine (LANTUS) injection 20 Units  20 Units SubCUTAneous DAILY Evy Espinal MD      
 metoprolol succinate (TOPROL-XL) XL tablet 200 mg  200 mg Oral DAILY Evy Espinal MD      
 potassium chloride SR (KLOR-CON 10) tablet 10 mEq  10 mEq Oral DAILY Evy Espinal MD      
 sertraline (ZOLOFT) tablet 75 mg  75 mg Oral DAILY Evy Espinal MD      
 0.9% sodium chloride infusion  50 mL/hr IntraVENous CONTINUOUS Pamela, Iva Dsouza MD 50 mL/hr at 12/06/19 2203 50 mL/hr at 12/06/19 2203  cefepime (MAXIPIME) 2 g in 0.9% sodium chloride (MBP/ADV) 100 mL  2 g IntraVENous Q24H Sherly Gaming  mL/hr at 12/06/19 2158 2 g at 12/06/19 2158  glucose chewable tablet 16 g  4 Tab Oral PRN Sherly Gaming MD      
 dextrose (D50) infusion 12.5-25 g  25-50 mL IntraVENous PRN Sherly Gaming MD      
 glucagon [facility]) injection 1 mg  1 mg IntraMUSCular PRN Sherly Gaming MD      
 insulin lispro (HUMALOG) injection   SubCUTAneous AC&HS Sherly Gaming MD   Stopped at 12/06/19 2200 Past History Past Medical History: 
Past Medical History:  
Diagnosis Date  Acquired lymphedema Right arm  Arrhythmia  Asthma  Atrial fibrillation ([facility] [address] Dr. Gianni Navarro and [facility]  Cancer ([facility] [address] bilateral breast  
 Chronic kidney disease  Diabetes mellitus type II   
 Dizziness  Essential hypertension  Hyperlipidemia  Hypertension  Long term (current) use of anticoagulants  Morbid obesity ([facility] [address] 3/14/2012  Osteoarthritis  Pacemaker  Sick sinus syndrome ([facility] [address] Past Surgical History: 
Past Surgical History:  
Procedure Laterality Date  ABDOMEN SURGERY PROC UNLISTED    
 gastric bypass  GASTRIC BYPASS,OBESE<150CM SAW-EN-Y  7/08  
 [facility]  HX AFIB ABLATION    
 HX CARPAL TUNNEL RELEASE [address] RIGHT MODIFIED RADICAL   
 HX MOHS PROCEDURES  1995  
 right  HX ORTHOPAEDIC    
 ight knee surgery  HX PACEMAKER    
 IR INSERT TUNL CVC W PORT OVER 5 YEARS    
 LAMINECTOMY,CERVICAL  1994  
 NEEDLE BIOPSY LIVER,W OTHR [address]  8.21.07  
 [place] [address] AND 1994  [place] TRABECULOPLASTY BY LASER SURGERY    
 US GUIDE ASP OVARIAN CYST ABD APPROACH  8.21.07  
 RIGHT Family History: 
Family History Problem Relation Age of Onset  Cancer Mother  Heart Disease Mother  Alcohol abuse Father  Diabetes Brother  Hypertension Brother Social History: 
Social History Tobacco Use  Smoking status: Never Smoker  Smokeless tobacco: Never Used Substance Use Topics  Alcohol use: Yes Comment: rare  Drug use: No  
 
 
Allergies: Allergies Allergen Reactions  Ace Inhibitors Cough  Amlodipine Swelling  Avapro [Irbesartan] Unable to Obtain  Bactrim [Sulfamethoxazole-Trimethoprim] Other (comments) Sore mouth  Captopril Cough  Carvedilol Diarrhea  Contrast Agent [Iodine] Itching [address]  Morphine Nausea and Vomiting and Swelling  Penicillins Hives  Pravachol [Pravastatin] Nausea Only  Seafood [Shellfish Containing Products] Hives  Singulair [Montelukast] Other (comments)  
  palpitations Review of Systems Review of Systems Constitutional: Negative for activity change, appetite change, chills, fever and unexpected weight change. HENT: Negative for congestion. Eyes: Negative for pain and visual disturbance. Respiratory: Negative for cough and shortness of breath. Cardiovascular: Positive for leg swelling. Negative for chest pain. Gastrointestinal: Negative for abdominal pain, diarrhea, nausea and vomiting. Genitourinary: Negative for dysuria. Musculoskeletal: Negative for back pain. Skin: Positive for color change and wound. Negative for rash. Neurological: Negative for headaches. Physical Exam  
Physical Exam 
Vitals signs and nursing note reviewed. Constitutional:   
   Appearance: She is well-developed. She is not diaphoretic. Comments: Morbidly obese middle-aged female currently in mild distress HENT:  
   Head: Normocephalic and atraumatic. Eyes:  
   General:     
   Right eye: No discharge. Left eye: No discharge. Conjunctiva/sclera: Conjunctivae normal.  
   Pupils: Pupils are equal, round, and reactive to light. Neck: Musculoskeletal: Normal range of motion and neck supple. Cardiovascular:  
   Rate and Rhythm: Normal rate and regular rhythm. Heart sounds: Normal heart sounds. No murmur. Pulmonary:  
   Effort: Pulmonary effort is normal. No respiratory distress. Breath sounds: Normal breath sounds. No wheezing or rales. Abdominal:  
   General: Bowel sounds are normal. There is no distension. Palpations: Abdomen is soft. Tenderness: There is no tenderness. Musculoskeletal: Normal range of motion. General: Swelling and tenderness present. No deformity. Right lower leg: Edema present. Left lower leg: Edema present. Skin: 
   General: Skin is warm and dry. Findings: No rash. Neurological:  
   Mental Status: She is alert and oriented to person, place, and time. Cranial Nerves: No cranial nerve deficit. Motor: No abnormal muscle tone. Diagnostic Study Results Labs - Recent Results (from the past 12 hour(s)) PROTHROMBIN TIME + INR Collection Time: 12/06/19  5:45 PM  
Result Value Ref Range INR 12.1 (HH) 0.9 - 1.1 Prothrombin time 108.0 (H) 9.0 - 11.1 sec CBC WITH AUTOMATED DIFF Collection Time: 12/06/19  5:49 PM  
Result Value Ref Range WBC 10.4 3.6 - 11.0 K/uL  
 RBC 3.68 (L) 3.80 - 5.20 M/uL  
 HGB 10.7 (L) 11.5 - 16.0 g/dL HCT 34.7 (L) 35.0 - 47.0 % MCV 94.3 80.0 - 99.0 FL  
 MCH 29.1 26.0 - 34.0 PG  
 MCHC 30.8 30.0 - 36.5 g/dL  
 RDW 13.4 11.5 - 14.5 % PLATELET 019 (H) 470 - 400 K/uL MPV 10.6 8.9 - 12.9 FL  
 NRBC 0.0 0  WBC ABSOLUTE NRBC 0.00 0.00 - 0.01 K/uL NEUTROPHILS 77 (H) 32 - 75 % LYMPHOCYTES 10 (L) 12 - 49 % MONOCYTES 9 5 - 13 % EOSINOPHILS 2 0 - 7 % BASOPHILS 1 0 - 1 % IMMATURE GRANULOCYTES 1 (H) 0.0 - 0.5 % ABS. NEUTROPHILS 8.2 (H) 1.8 - 8.0 K/UL ABS. LYMPHOCYTES 1.0 0.8 - 3.5 K/UL  
 ABS. MONOCYTES 0.9 0.0 - 1.0 K/UL  
 ABS. EOSINOPHILS 0.2 0.0 - 0.4 K/UL  
 ABS. BASOPHILS 0.1 0.0 - 0.1 K/UL  
 ABS. IMM. GRANS. 0.1 (H) 0.00 - 0.04 K/UL  
 DF AUTOMATED METABOLIC PANEL, BASIC Collection Time: 12/06/19  5:49 PM  
Result Value Ref Range Sodium 137 136 - 145 mmol/L Potassium 4.2 3.5 - 5.1 mmol/L Chloride 106 97 - 108 mmol/L  
 CO2 25 21 - 32 mmol/L Anion gap 6 5 - 15 mmol/L Glucose 141 (H) 65 - 100 mg/dL BUN 34 (H) 6 - 20 MG/DL Creatinine 2.19 (H) 0.55 - 1.02 MG/DL  
 BUN/Creatinine ratio 16 12 - 20 GFR est AA 28 (L) >60 ml/min/1.73m2 GFR est non-AA 23 (L) >60 ml/min/1.73m2 Calcium 8.8 8.5 - 10.1 MG/DL MAGNESIUM Collection Time: 12/06/19  5:49 PM  
Result Value Ref Range Magnesium 2.4 1.6 - 2.4 mg/dL LACTIC ACID Collection Time: 12/06/19  8:51 PM  
Result Value Ref Range Lactic acid 0.9 0.4 - 2.0 MMOL/L  
GLUCOSE, POC Collection Time: 12/06/19  9:11 PM  
Result Value Ref Range Glucose (POC) 145 (H) 65 - 100 mg/dL Performed by Leopoldo Madison Radiologic Studies - No orders to display CT Results  (Last 48 hours) None CXR Results  (Last 48 hours) None Medical Decision Making I am the first provider for this patient. I reviewed the vital signs, available nursing notes, past medical history, past surgical history, family history and social history. Vital Signs-Reviewed the patient's vital signs. Patient Vitals for the past 12 hrs: 
 Temp Pulse Resp BP SpO2  
12/07/19 0000 98 °F (36.7 °C) 60 20 116/57 95 % 12/06/19 2159  69  142/52   
12/06/19 2107 98.2 °F (36.8 °C) 72 18 136/52 97 % 12/06/19 1837     97 % 12/06/19 1836    155/67   
12/06/19 1553 98.1 °F (36.7 °C) 68 18 164/60 100 % Pulse Oximetry Analysis - 100% on RA Cardiac Monitor:  
Rate: 68bpm 
 
Records Reviewed: Nursing Notes, Old Medical Records, Previous Radiology Studies and Previous Laboratory Studies Provider Notes (Medical Decision Making): MDM: Middle-aged diabetic with significant pathology presenting with an exam that is concerning for DVT. However will initiate infectious work-up while awaiting ultrasound. ED Course:  
Initial assessment performed. The patients presenting problems have been discussed, and they are in agreement with the care plan formulated and outlined with them. I have encouraged them to ask questions as they arise throughout their visit. CONSULT NOTE:  
6:50 PM 
Michelle Brewer MD spoke with Dr Ivon Jensen, Specialty: [facility] Discussed pt's hx, disposition, and available diagnostic and imaging results. Reviewed care plans. Consultant agrees with plans as outlined. She will evaluate the patient for admission. 7pm 
PT updated on her lab results, including the INR which is likely from the antibiotics. Reviewed ROS and she denies any bleeding. Critical Care Time:  
0 Diagnosis Clinical Impression: 1. Cellulitis of right lower extremity 2. Supratherapeutic INR PLAN: 
1. Admission to the internal medicine service Please note, this dictation was completed with [employer], the computer voice recognition software. Quite often unanticipated grammatical, syntax, homophones, and other interpretive errors are inadvertently transcribed by the computer software. Please disregard these errors. Please excuse any errors that have escaped final proof reading.

## 2019-12-06 NOTE — H&P
Hospitalist Admission Note NAME: Bhanu Freeman :  1960 MRN:  [id number] Date/Time:  2019 6:58 PM 
 
Patient PCP: Ghulam Mas MD 
______________________________________________________________________ Given the patient's current clinical presentation, I have a high level of concern for decompensation if discharged from the emergency department. Complex decision making was performed, which includes reviewing the patient's available past medical records, laboratory results, and x-ray films. My assessment of this patient's clinical condition and my plan of care is as follows. Assessment / Plan: 
Right lower extremity cellulitis 
-Failed outpatient management with clindamycin x1 week 
-Doppler US negative for DVT 
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 
-Continue wound care 
-Continue Lantus per home routine 
-SSI AC/HS Coagulopathy due to anticoagulant use -INR 12 in setting of antibiotic use along with warfarin! 
-Currently with stable Hgb and no signs of ongoing bleeding 
-Given Vit K in ED 
-Repeat INR in AM, hold Coumadin at present Atrial fibrillation on warfarin 
-Continue metoprolol 
-Hold warfarin due to elevated INR History of bilateral breast cancer Essential hypertension Hyperlipidemia Code Status: Full code Surrogate Decision Maker: Abelardo Barnard DVT Prophylaxis: None since INR 12 
GI Prophylaxis: not indicated Subjective: CHIEF COMPLAINT: Right thigh redness HISTORY OF PRESENT ILLNESS:    
Red Child is a 60 y.o.  female who presents with redness, swelling, and pain of the right thigh.  She endorses a history of many episodes of cellulitis in large part because she has chronic ulcers of the bilateral lower extremities. She says that she started to feel sick 1 week ago with malaise and subjective fever and because she figured that she was becoming ill, she started taking clindamycin that she'd had from a prior illness. That seemed to help initially, but then on Wednesday she noted that her right calf and thigh were becoming red and painful. Despite continuing to use the clindamycin, that redness and pain spread. She contacted her wound care specialist and was seen in clinic today and in light of the spreading of erythema and pain despite clindamycin use, she was referred to the ED. She believes her last fever was 1 week ago. She has drainage from her foot ulcers but not from the leg. She denies nausea, vomiting, diarrhea. We were asked to admit for work up and evaluation of the above problems. Past Medical History:  
Diagnosis Date  Acquired lymphedema Right arm  Arrhythmia  Asthma  Atrial fibrillation ([facility] [address] Dr. Ric Almeida [facility]  Cancer ([facility] [address] bilateral breast  
 Chronic kidney disease  Diabetes mellitus type II   
 Dizziness  Essential hypertension  Hyperlipidemia  Hypertension  Long term (current) use of anticoagulants  Morbid obesity ([facility] [address] 3/14/2012  Osteoarthritis  Pacemaker  Sick sinus syndrome ([facility] [address] Past Surgical History:  
Procedure Laterality Date  ABDOMEN SURGERY PROC UNLISTED    
 gastric bypass  GASTRIC BYPASS,OBESE<150CM [facility]EN[facility]Y  7/08  
 [facility]  HX AFIB ABLATION    
 HX CARPAL TUNNEL RELEASE [address] [facility] [address] RIGHT MODIFIED RADICAL   
 HX MOHS PROCEDURES  1995  
 right  HX ORTHOPAEDIC    
 ight knee surgery  HX PACEMAKER    
 IR INSERT TUNL CVC W PORT OVER 5 YEARS    
 LAMINECTOMY,CERVICAL  1994  
 NEEDLE BIOPSY LIVER,W OTHR [address] [facility]  8.21.07  
 [address] AND 1994  DC TRABECULOPLASTY BY LASER SURGERY    
 US GUIDE ASP OVARIAN CYST ABD APPROACH  8.21.07  
 RIGHT Social History Tobacco Use  Smoking status: Never Smoker  Smokeless tobacco: Never Used Substance Use Topics  Alcohol use: Yes Comment: rare Family History Problem Relation Age of Onset  Cancer Mother  Heart Disease Mother  Alcohol abuse Father  Diabetes Brother  Hypertension Brother Allergies Allergen Reactions  Ace Inhibitors Cough  Amlodipine Swelling  Avapro [Irbesartan] Unable to Obtain  Bactrim [Sulfamethoxazole-Trimethoprim] Other (comments) Sore mouth  Captopril Cough  Carvedilol Diarrhea  Contrast Agent [Iodine] Itching [address]  Morphine Nausea and Vomiting and Swelling  Penicillins Hives  Pravachol [Pravastatin] Nausea Only  Seafood [Shellfish Containing Products] Hives  Singulair [Montelukast] Other (comments)  
  palpitations Prior to Admission medications Medication Sig Start Date End Date Taking? Authorizing Provider  
warfarin (COUMADIN) 4 mg tablet Take 2 tablets on Nov 12 then 1 tablet on Monday and Thursday and  a half tablet the other 5 days. 11/12/19   Myriam Rhodes MD  
insulin glargine (LANTUS U-100 INSULIN) 100 unit/mL injection Inject 20 units daily 11/10/19   Myriam Rhodes MD  
hydrALAZINE (APRESOLINE) 100 mg tablet TAKE ONE TABLET BY MOUTH THREE TIMES A DAY 10/21/19   Myriam Rhodes MD  
sertraline (ZOLOFT) 50 mg tablet TAKE ONE AND ONE-HALF (1 & 1/2) TABLET BY MOUTH DAILY 8/22/19   Myriam Rhodes MD  
bumetanide (BUMEX) 1 mg tablet Take 2 mg by mouth daily. Provider, Historical  
cloNIDine HCl (CATAPRES) 0.3 mg tablet Take 0.6 mg by mouth nightly.     Provider, Historical  
metoprolol succinate (TOPROL-XL) 100 mg tablet TAKE TWO TABLETS BY MOUTH DAILY 7/12/19   Myriam Rhodes MD  
 doxazosin (CARDURA) 4 mg tablet TAKE ONE TABLET BY MOUTH ONCE NIGHTLY 6/14/19   Italo Vogel MD  
busPIRone (BUSPAR) 10 mg tablet TAKE ONE TABLET BY MOUTH TWICE A DAY 5/24/19   Italo Vogel MD  
potassium chloride SR (KLOR-CON 10) 10 mEq tablet TAKE ONE TABLET BY MOUTH DAILY 4/22/19   Julieth Fischer MD  
metolazone (ZAROXOLYN) 5 mg tablet Take 1 Tab by mouth daily as needed (take if develop increased LE edema, weight gain > 5 pounds. ). 4/10/14   Trice Pena NP  
cholecalciferol, VITAMIN D3, (VITAMIN D3) 5,000 unit tab tablet Take 5,000 Units by mouth daily. Provider, Historical  
vitamin A 8,000 unit capsule Take 8,000 Units by mouth daily. Provider, Historical  
insulin lispro (HUMALOG) 100 unit/mL kwikpen 2 units with dinner for sugars over 200 1/3/14   Italo Vogel MD  
cyanocobalamin (VITAMIN B-12) 1,000 mcg sublingual tablet Take 1,000 mcg by mouth daily. Provider, Historical  
 
 
REVIEW OF SYSTEMS:    
I am not able to complete the review of systems because: The patient is intubated and sedated The patient has altered mental status due to his acute medical problems The patient has baseline aphasia from prior stroke(s) The patient has baseline dementia and is not reliable historian The patient is in acute medical distress and unable to provide information Total of 12 systems reviewed as follows:   
   POSITIVE= underlined text  Negative = text not underlined General:  fever, chills, sweats, generalized weakness, weight loss/gain,  
   loss of appetite Eyes:    blurred vision, eye pain, loss of vision, double vision ENT:    rhinorrhea, pharyngitis Respiratory:   cough, sputum production, SOB, CHOW, wheezing, pleuritic pain  
Cardiology:   chest pain, palpitations, orthopnea, PND, edema, syncope Gastrointestinal:  abdominal pain , N/V, diarrhea, dysphagia, constipation, bleeding Genitourinary:  frequency, urgency, dysuria, hematuria, incontinence Muskuloskeletal :  arthralgia, myalgia, back pain Hematology:  easy bruising, nose or gum bleeding, lymphadenopathy Dermatological: rash, ulceration, pruritis, color change / jaundice Endocrine:   hot flashes or polydipsia Neurological:  headache, dizziness, confusion, focal weakness, paresthesia, Speech difficulties, memory loss, gait difficulty Psychological: Feelings of anxiety, depression, agitation Objective: VITALS:   
Visit Vitals /67 Pulse 68 Temp 98.1 °F (36.7 °C) Resp 18 Ht 5' 9\" (1.753 m) Wt 113.9 kg (251 lb 1.7 oz) SpO2 97% BMI 37.08 kg/m² PHYSICAL EXAM: 
 
General:    Alert, cooperative, no distress, appears stated age. Obese. HEENT: Atraumatic, anicteric sclerae, pink conjunctivae No oral ulcers, mucosa moist, throat clear, dentition fair Neck:  Supple, symmetrical,  thyroid: non tender Lungs:   Clear to auscultation bilaterally. No Wheezing or Rhonchi. No rales. Chest wall:  No tenderness  No Accessory muscle use. Heart:   Regular  rhythm,  No  murmur   No edema Abdomen:   Soft, non-tender. Not distended. Bowel sounds normal 
Extremities: No cyanosis. No clubbing,   
  Skin turgor normal, Capillary refill normal, Radial distal pulse 2+ Skin:     Not pale. Not Jaundiced  There is a subtle erythematous indurated area of the medial and posterior right thigh extending to the posterolateral proximal right calf, tender, with no palpable fluid collections. There are ulcerations of the bilateral feet. Left plantar foot wound with mild malodorous drainage. Psych:  Good insight. Not depressed. Not anxious or agitated. Neurologic: EOMs intact. No facial asymmetry. No aphasia or slurred speech. Symmetrical strength, Sensation grossly intact. Alert and oriented X 4.  
 
_______________________________________________________________________ Care Plan discussed with: 
 Comments Patient x Family RN Care Manager Consultant:     
_______________________________________________________________________ Expected  Disposition:  
Home with Family x HH/PT/OT/RN   
SNF/LTC   
RENE   
________________________________________________________________________ TOTAL TIME:  45 Minutes Critical Care Provided     Minutes non procedure based Comments  
 x Reviewed previous records  
>50% of visit spent in counseling and coordination of care x Discussion with patient and/or family and questions answered 
  
 
________________________________________________________________________ Signed: Chapito Franco MD 
 
Procedures: see electronic medical records for all procedures/Xrays and details which were not copied into this note but were reviewed prior to creation of Plan. LAB DATA REVIEWED:   
Recent Results (from the past 24 hour(s)) PROTHROMBIN TIME + INR Collection Time: 12/06/19  5:45 PM  
Result Value Ref Range INR 12.1 (HH) 0.9 - 1.1 Prothrombin time 108.0 (H) 9.0 - 11.1 sec CBC WITH AUTOMATED DIFF Collection Time: 12/06/19  5:49 PM  
Result Value Ref Range WBC 10.4 3.6 - 11.0 K/uL  
 RBC 3.68 (L) 3.80 - 5.20 M/uL  
 HGB 10.7 (L) 11.5 - 16.0 g/dL HCT 34.7 (L) 35.0 - 47.0 % MCV 94.3 80.0 - 99.0 FL  
 MCH 29.1 26.0 - 34.0 PG  
 MCHC 30.8 30.0 - 36.5 g/dL  
 RDW 13.4 11.5 - 14.5 % PLATELET 584 (H) 507 - 400 K/uL MPV 10.6 8.9 - 12.9 FL  
 NRBC 0.0 0  WBC ABSOLUTE NRBC 0.00 0.00 - 0.01 K/uL NEUTROPHILS 77 (H) 32 - 75 % LYMPHOCYTES 10 (L) 12 - 49 % MONOCYTES 9 5 - 13 % EOSINOPHILS 2 0 - 7 % BASOPHILS 1 0 - 1 % IMMATURE GRANULOCYTES 1 (H) 0.0 - 0.5 % ABS. NEUTROPHILS 8.2 (H) 1.8 - 8.0 K/UL  
 ABS. LYMPHOCYTES 1.0 0.8 - 3.5 K/UL  
 ABS. MONOCYTES 0.9 0.0 - 1.0 K/UL  
 ABS. EOSINOPHILS 0.2 0.0 - 0.4 K/UL  
 ABS. BASOPHILS 0.1 0.0 - 0.1 K/UL  
 ABS. IMM. GRANS. 0.1 (H) 0.00 - 0.04 K/UL DF AUTOMATED METABOLIC PANEL, BASIC Collection Time: 12/06/19  5:49 PM  
Result Value Ref Range Sodium 137 136 - 145 mmol/L Potassium 4.2 3.5 - 5.1 mmol/L Chloride 106 97 - 108 mmol/L  
 CO2 25 21 - 32 mmol/L Anion gap 6 5 - 15 mmol/L Glucose 141 (H) 65 - 100 mg/dL BUN 34 (H) 6 - 20 MG/DL Creatinine 2.19 (H) 0.55 - 1.02 MG/DL  
 BUN/Creatinine ratio 16 12 - 20 GFR est AA 28 (L) >60 ml/min/1.73m2 GFR est non-AA 23 (L) >60 ml/min/1.73m2 Calcium 8.8 8.5 - 10.1 MG/DL MAGNESIUM Collection Time: 12/06/19  5:49 PM  
Result Value Ref Range Magnesium 2.4 1.6 - 2.4 mg/dL

## 2019-12-06 NOTE — WOUND CARE
12/06/19 1505 Wound Foot Right;Plantar;Lateral 07/30/19 Date First Assessed/Time First Assessed: 07/30/19 1034   Present on Hospital Admission: Yes  Wound Approximate Age at First Assessment (Weeks): 1 weeks  Primary Wound Type: Diabetic Ulcer  Location: Foot  Wound Location Orientation: Right;Plantar;Late. .. Dressing Status Removed Dressing Type Gauze;Gauze wrap (arnie) 
(B) Wound Length (cm) 1 cm Wound Width (cm) 0.7 cm Wound Depth (cm) 0.1 cm Wound Surface Area (cm^2) 0.7 cm^2 Wound Volume (cm^3) 0.07 cm^3 Condition of Base Pink Condition of Edges Calloused Drainage Amount Moderate Drainage Color Serosanguinous Wound Odor None Sandra-wound Assessment Intact Cleansing and Cleansing Agents  Normal saline Wound Foot Left;Plantar Date First Assessed/Time First Assessed: 06/21/19 0857   Present on Hospital Admission: Yes  Primary Wound Type: Diabetic  Location: Foot  Wound Location Orientation: Left;Plantar Dressing Status Removed Dressing Type Bacteriostatic dressing;Foam;Gauze;Gauze wrap (arnie) Wound Length (cm) 3 cm Wound Width (cm) 2.4 cm Wound Depth (cm) 0.4 cm Wound Surface Area (cm^2) 7.2 cm^2 Wound Volume (cm^3) 2.88 cm^3 Condition of Base Pink Condition of Edges Calloused Undermining (cm) 0.3 cm Direction of Undermining 5 o'clock;9 o'clock Drainage Amount Large Drainage Color Serosanguinous Wound Odor None Sandra-wound Assessment Intact; Maceration

## 2019-12-06 NOTE — WOUND CARE
12/06/19 1554 Wound Foot Right;Plantar;Lateral 07/30/19 Date First Assessed/Time First Assessed: 07/30/19 1034   Present on Hospital Admission: Yes  Wound Approximate Age at First Assessment (Weeks): 1 weeks  Primary Wound Type: Diabetic Ulcer  Location: Foot  Wound Location Orientation: Right;Plantar;Late. .. Dressing Type Applied Alginate;Silver products;Gauze;Gauze wrap (arnie) Wound Foot Left;Plantar Date First Assessed/Time First Assessed: 06/21/19 0857   Present on Hospital Admission: Yes  Primary Wound Type: Diabetic  Location: Foot  Wound Location Orientation: Left;Plantar Cleansing and Cleansing Agents  Normal saline Dressing Type Applied Alginate;Silver products;Gauze;Gauze wrap (ranie) Discharge Condition: Stable Pain: 10 Ambulatory Status: Walking Discharge Destination: Emergency Department Transportation: Car Accompanied by: Self  and Family/Caregiver Discharge instructions reviewed with Family/Caregiver  and copy or written instructions have been provided. All questions/concerns have been addressed at this time.

## 2019-12-07 LAB
ANION GAP SERPL CALC-SCNC: 7 MMOL/L (ref 5–15)
BASOPHILS # BLD: 0.1 K/UL (ref 0–0.1)
BASOPHILS NFR BLD: 1 % (ref 0–1)
BUN SERPL-MCNC: 32 MG/DL (ref 6–20)
BUN/CREAT SERPL: 16 (ref 12–20)
CALCIUM SERPL-MCNC: 8.2 MG/DL (ref 8.5–10.1)
CHLORIDE SERPL-SCNC: 108 MMOL/L (ref 97–108)
CO2 SERPL-SCNC: 23 MMOL/L (ref 21–32)
CREAT SERPL-MCNC: 1.99 MG/DL (ref 0.55–1.02)
DIFFERENTIAL METHOD BLD: ABNORMAL
EOSINOPHIL # BLD: 0.3 K/UL (ref 0–0.4)
EOSINOPHIL NFR BLD: 3 % (ref 0–7)
ERYTHROCYTE [DISTWIDTH] IN BLOOD BY AUTOMATED COUNT: 13.4 % (ref 11.5–14.5)
GLUCOSE BLD STRIP.AUTO-MCNC: 138 MG/DL (ref 65–100)
GLUCOSE BLD STRIP.AUTO-MCNC: 156 MG/DL (ref 65–100)
GLUCOSE BLD STRIP.AUTO-MCNC: 174 MG/DL (ref 65–100)
GLUCOSE BLD STRIP.AUTO-MCNC: 182 MG/DL (ref 65–100)
GLUCOSE SERPL-MCNC: 145 MG/DL (ref 65–100)
HCT VFR BLD AUTO: 29.2 % (ref 35–47)
HGB BLD-MCNC: 9 G/DL (ref 11.5–16)
IMM GRANULOCYTES # BLD AUTO: 0.1 K/UL (ref 0–0.04)
IMM GRANULOCYTES NFR BLD AUTO: 1 % (ref 0–0.5)
INR PPP: 1.8 (ref 0.9–1.1)
LACTATE BLD-SCNC: 0.92 MMOL/L (ref 0.4–2)
LYMPHOCYTES # BLD: 1.1 K/UL (ref 0.8–3.5)
LYMPHOCYTES NFR BLD: 13 % (ref 12–49)
MCH RBC QN AUTO: 29.5 PG (ref 26–34)
MCHC RBC AUTO-ENTMCNC: 30.8 G/DL (ref 30–36.5)
MCV RBC AUTO: 95.7 FL (ref 80–99)
MONOCYTES # BLD: 0.9 K/UL (ref 0–1)
MONOCYTES NFR BLD: 10 % (ref 5–13)
NEUTS SEG # BLD: 6.1 K/UL (ref 1.8–8)
NEUTS SEG NFR BLD: 72 % (ref 32–75)
NRBC # BLD: 0 K/UL (ref 0–0.01)
NRBC BLD-RTO: 0 PER 100 WBC
PLATELET # BLD AUTO: 394 K/UL (ref 150–400)
PMV BLD AUTO: 10.3 FL (ref 8.9–12.9)
POTASSIUM SERPL-SCNC: 4 MMOL/L (ref 3.5–5.1)
PROTHROMBIN TIME: 18 SEC (ref 9–11.1)
RBC # BLD AUTO: 3.05 M/UL (ref 3.8–5.2)
SERVICE CMNT-IMP: ABNORMAL
SODIUM SERPL-SCNC: 138 MMOL/L (ref 136–145)
WBC # BLD AUTO: 8.5 K/UL (ref 3.6–11)

## 2019-12-07 PROCEDURE — 74011250636 HC RX REV CODE- 250/636: Performed by: EMERGENCY MEDICINE

## 2019-12-07 PROCEDURE — 94760 N-INVAS EAR/PLS OXIMETRY 1: CPT

## 2019-12-07 PROCEDURE — 65270000029 HC RM PRIVATE

## 2019-12-07 PROCEDURE — 74011636637 HC RX REV CODE- 636/637: Performed by: INTERNAL MEDICINE

## 2019-12-07 PROCEDURE — 74011250637 HC RX REV CODE- 250/637: Performed by: INTERNAL MEDICINE

## 2019-12-07 PROCEDURE — 80048 BASIC METABOLIC PNL TOTAL CA: CPT

## 2019-12-07 PROCEDURE — 85610 PROTHROMBIN TIME: CPT

## 2019-12-07 PROCEDURE — 74011250636 HC RX REV CODE- 250/636: Performed by: INTERNAL MEDICINE

## 2019-12-07 PROCEDURE — 36415 COLL VENOUS BLD VENIPUNCTURE: CPT

## 2019-12-07 PROCEDURE — 82962 GLUCOSE BLOOD TEST: CPT

## 2019-12-07 PROCEDURE — 85025 COMPLETE CBC W/AUTO DIFF WBC: CPT

## 2019-12-07 PROCEDURE — 74011000258 HC RX REV CODE- 258: Performed by: INTERNAL MEDICINE

## 2019-12-07 RX ADMIN — VITAMIN D, TAB 1000IU (100/BT) 5 TABLET: 25 TAB at 09:06

## 2019-12-07 RX ADMIN — BUSPIRONE HYDROCHLORIDE 10 MG: 5 TABLET ORAL at 16:49

## 2019-12-07 RX ADMIN — DOXAZOSIN 4 MG: 2 TABLET ORAL at 21:23

## 2019-12-07 RX ADMIN — BUSPIRONE HYDROCHLORIDE 10 MG: 5 TABLET ORAL at 09:06

## 2019-12-07 RX ADMIN — VANCOMYCIN HYDROCHLORIDE 2000 MG: 10 INJECTION, POWDER, LYOPHILIZED, FOR SOLUTION INTRAVENOUS at 00:03

## 2019-12-07 RX ADMIN — INSULIN GLARGINE 20 UNITS: 100 INJECTION, SOLUTION SUBCUTANEOUS at 09:04

## 2019-12-07 RX ADMIN — CEFEPIME 2 G: 2 INJECTION, POWDER, FOR SOLUTION INTRAVENOUS at 13:29

## 2019-12-07 RX ADMIN — METOPROLOL SUCCINATE 200 MG: 50 TABLET, EXTENDED RELEASE ORAL at 09:05

## 2019-12-07 RX ADMIN — VANCOMYCIN HYDROCHLORIDE 1250 MG: 10 INJECTION, POWDER, LYOPHILIZED, FOR SOLUTION INTRAVENOUS at 21:28

## 2019-12-07 RX ADMIN — SERTRALINE HYDROCHLORIDE 75 MG: 50 TABLET ORAL at 09:06

## 2019-12-07 RX ADMIN — POTASSIUM CHLORIDE 10 MEQ: 750 TABLET, FILM COATED, EXTENDED RELEASE ORAL at 09:06

## 2019-12-07 RX ADMIN — Medication 10 ML: at 06:13

## 2019-12-07 RX ADMIN — Medication 10 ML: at 21:24

## 2019-12-07 RX ADMIN — HYDRALAZINE HYDROCHLORIDE 100 MG: 50 TABLET, FILM COATED ORAL at 13:30

## 2019-12-07 RX ADMIN — INSULIN LISPRO 2 UNITS: 100 INJECTION, SOLUTION INTRAVENOUS; SUBCUTANEOUS at 13:28

## 2019-12-07 RX ADMIN — HYDRALAZINE HYDROCHLORIDE 100 MG: 50 TABLET, FILM COATED ORAL at 06:07

## 2019-12-07 RX ADMIN — INSULIN LISPRO 2 UNITS: 100 INJECTION, SOLUTION INTRAVENOUS; SUBCUTANEOUS at 16:48

## 2019-12-07 RX ADMIN — CYANOCOBALAMIN TAB 500 MCG 1000 MCG: 500 TAB at 09:05

## 2019-12-07 RX ADMIN — HYDRALAZINE HYDROCHLORIDE 100 MG: 50 TABLET, FILM COATED ORAL at 21:23

## 2019-12-07 RX ADMIN — Medication 10 ML: at 13:33

## 2019-12-07 RX ADMIN — CLONIDINE HYDROCHLORIDE 0.6 MG: 0.1 TABLET ORAL at 21:23

## 2019-12-07 NOTE — PROGRESS NOTES
Bedside and Verbal shift change report given to Albert URIOSTEGUI (oncoming nurse) by Smooth Pham (offgoing nurse). Report included the following information SBAR, Kardex, Intake/Output, MAR, Recent Results and Med Rec Status.

## 2019-12-07 NOTE — PROGRESS NOTES
Hospitalist Progress Note NAME: Spencer Singer :  1960 MRN:  [id number] Assessment / Plan: 
Right lower extremity cellulitis 
-Failed outpatient management with clindamycin x1 week 
-Doppler US negative for DVT 
-Patient currently without overt signs or symptoms of sepsis -Given presence of DM with foot ulcers 
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 
-Continue wound care 
-Continue Lantus per home routine 
-SSI AC/HS, HbA1C 6.7 Coagulopathy due to anticoagulant use -INR 12 in setting of antibiotic use along with warfarin! 
-Currently with stable Hgb and no signs of ongoing bleeding 
-Given Vit K in ED, hold Coumadin, monitor INR. Atrial fibrillation on warfarin Continue metoprolol, hold Coumadin History of bilateral breast cancer Essential hypertension Hyperlipidemia Obesity: BMI 37.08 s/p Bariatric Surgery, there is some lymphedema in legs. Surrogate Decision Maker: Bertram García Code status: Full Prophylaxis: Not indicated Recommended Disposition: Home w/Family Subjective: Chief Complaint / Reason for Physician Visit \"My right leg is sore\". Discussed with RN events overnight. Review of Systems: 
Symptom Y/N Comments  Symptom Y/N Comments Fever/Chills    Chest Pain Poor Appetite    Edema Cough    Abdominal Pain Sputum    Joint Pain y   
SOB/CHOW    Pruritis/Rash y   
Nausea/vomit    Tolerating PT/OT Diarrhea    Tolerating Diet y Constipation    Other Could NOT obtain due to:   
 
Objective: VITALS:  
Last 24hrs VS reviewed since prior progress note. Most recent are: 
Patient Vitals for the past 24 hrs: 
 Temp Pulse Resp BP SpO2  
19 0742 98.2 °F (36.8 °C) 60 16 126/52 96 % 19 0407 98.5 °F (36.9 °C) 60 18 159/68 96 % 19 0000 98 °F (36.7 °C) 60 20 116/57 95 % 19 2159  69  142/52  12/06/19 2107 98.2 °F (36.8 °C) 72 18 136/52 97 % 12/06/19 1837     97 % 12/06/19 1836    155/67   
12/06/19 1553 98.1 °F (36.7 °C) 68 18 164/60 100 % Intake/Output Summary (Last 24 hours) at 12/7/2019 1116 Last data filed at 12/7/2019 0700 Gross per 24 hour Intake 50 ml Output 500 ml Net -450 ml PHYSICAL EXAM: 
General: WD, WN. Alert, cooperative, no acute distress   
EENT:  EOMI. Anicteric sclerae. MMM Resp:  Coarse BS 
CV:  Regular  rhythm,  +  edema GI:  Soft, Non distended, Non tender.  +Bowel sounds Neurologic:  Alert and oriented X 3, normal speech, Psych:   Good insight. Not anxious nor agitated Skin:  +  rashes. No jaundice Reviewed most current lab test results and cultures  YES Reviewed most current radiology test results   YES Review and summation of old records today    NO Reviewed patient's current orders and MAR    YES 
PMH/ reviewed - no change compared to H&P 
________________________________________________________________________ Care Plan discussed with: 
  Comments Patient y Family RN y   
Care Manager Consultant Multidiciplinary team rounds were held today with , nursing, pharmacist and clinical coordinator. Patient's plan of care was discussed; medications were reviewed and discharge planning was addressed. ________________________________________________________________________ Total NON critical care TIME:  35   Minutes Total CRITICAL CARE TIME Spent:   Minutes non procedure based Comments >50% of visit spent in counseling and coordination of care y   
________________________________________________________________________ Frannie Pop MD  
 
Procedures: see electronic medical records for all procedures/Xrays and details which were not copied into this note but were reviewed prior to creation of Plan. LABS: 
I reviewed today's most current labs and imaging studies. Pertinent labs include: 
Recent Labs 12/07/19 0306 12/06/19 1749 WBC 8.5 10.4 HGB 9.0* 10.7* HCT 29.2* 34.7*  
 442* Recent Labs 12/07/19 0306 12/06/19 1749 12/06/19 1745  137  --   
K 4.0 4.2  --   
 106  --   
CO2 23 25  --   
* 141*  --   
BUN 32* 34*  --   
CREA 1.99* 2.19*  --   
CA 8.2* 8.8  --   
MG  --  2.4  --   
INR  --   --  12.1* Signed: Brannon Grady MD

## 2019-12-07 NOTE — ED NOTES
TRANSFER - OUT REPORT: 
 
Verbal report given to Yomaira FAIRCHILD (name) on Francisco Weiss  being transferred to [facility] (unit) for routine progression of care Report consisted of patients Situation, Background, Assessment and  
Recommendations(SBAR). Information from the following report(s) SBAR, ED Summary, [facility] and Recent Results was reviewed with the receiving nurse. Lines:  
Peripheral IV 12/06/19 Left Antecubital (Active) Site Assessment Clean, dry, & intact 12/6/2019  6:17 PM  
Phlebitis Assessment 0 12/6/2019  6:17 PM  
Infiltration Assessment 0 12/6/2019  6:17 PM  
Dressing Status Clean, dry, & intact 12/6/2019  6:17 PM  
Dressing Type Transparent 12/6/2019  6:17 PM  
Hub Color/Line Status Pink 12/6/2019  6:17 PM  
  
 
Opportunity for questions and clarification was provided.

## 2019-12-07 NOTE — PROGRESS NOTES
Pharmacy Automatic Renal Dosing Protocol - Antimicrobials Indication for Antimicrobials: SSTI Current Regimen of Each Antimicrobial: 
Vancomycin 2000 mg x 1 then 1250 mg Q24H (Start Date ; Day # 2) Cefepime 2g iv q24h (start date ; day 2) Previous Antimicrobial Therapy: 
Metronidazole 500 mg x 1 Ceftriaxone 1 gram x 1 Vancomycin Goal Level: 10-15 mcg/ml Vancomycin Levels Date Dose & Interval Measured (mcg/mL) Steady State (mcg/mL) Date & time of next level:  
 
Significant Cultures:  
: paired blood - pending Radiology / Imaging results: (X-ray, CT scan or MRI):  
 
 
Labs: 
Recent Labs 19 
0306 19 
1749 CREA 1.99* 2.19* BUN 32* 34* WBC 8.5 10.4 Temp (24hrs), Av.3 °F (36.8 °C), Min:98 °F (36.7 °C), Max:98.8 °F (37.1 °C) Paralysis, amputations, malnutrition:  
Creatinine Clearance (mL/min) or Dialysis: 40.5 Impression/Plan: 
Cefepime renally adjusted to 2g IV q12h from  2g iv q24h per renal protocol. Vancomycin dosed. BMP daily Duration of therapy: Vancomycin TBD. Cefepime: , 7 days. Pharmacy will follow daily and adjust medications as appropriate for renal function and/or serum levels. Thank you, Jonathan Chavira, PHARMD 
 
 
Recommended duration of therapy 
http://[facility]/Pharmacy/Clinical%20Companion/Duration%20of%20ABX%20therapy. docx Renal Dosing 
http://[facility]/[place]/[facility]/Pharmacy/Clinical%20Companion/Renal%[email or web address]. pdf

## 2019-12-07 NOTE — PROGRESS NOTES
Pharmacy Automatic Renal Dosing Protocol - Antimicrobials Indication for Antimicrobials: SSTI Current Regimen of Each Antimicrobial: 
Vancomycin 2000 mg x 1 then 1250 mg Q24H (Start Date ; Day # 1) Cefepime 2g iv q24h (start date ; day 1) Previous Antimicrobial Therapy: 
Metronidazole 500 mg x 1 Ceftriaxone 1 gram x 1 Vancomycin Goal Level: 10-15 mcg/ml Vancomycin Levels Date Dose & Interval Measured (mcg/mL) Steady State (mcg/mL) Date & time of next level:  
 
Significant Cultures:  
 
Radiology / Imaging results: (X-ray, CT scan or MRI):  
 
 
Labs: 
Recent Labs 19 
1749 CREA 2.19* BUN 34* WBC 10.4 Temp (24hrs), Av.5 °F (36.9 °C), Min:98.1 °F (36.7 °C), Max:98.8 °F (37.1 °C) Paralysis, amputations, malnutrition:  
Creatinine Clearance (mL/min) or Dialysis: 28.6 Impression/Plan: 
Vancomycin dosed as above Cefepime renally adjusted from 2g IV q12h to 2g iv q24h per renal protocol. [facility] daily Pharmacy will follow daily and adjust medications as appropriate for renal function and/or serum levels. Thank you, 
Ashley Mcgrath, [facility] Recommended duration of therapy 
http://[facility]/[place]/[facility]/Pharmacy/Clinical%20Companion/Duration%20of%20ABX%20therapy. docx Renal Dosing 
http://[facility]/[place]/[facility]/Pharmacy/Clinical%20Companion/Renal%[id number]. pdf

## 2019-12-07 NOTE — ED NOTES
Bedside shift change report given to [employer]. Report included the following information SBAR.

## 2019-12-08 LAB
ALBUMIN SERPL-MCNC: 2.5 G/DL (ref 3.5–5)
ALBUMIN/GLOB SERPL: 0.6 {RATIO} (ref 1.1–2.2)
ALP SERPL-CCNC: 83 U/L (ref 45–117)
ALT SERPL-CCNC: 12 U/L (ref 12–78)
ANION GAP SERPL CALC-SCNC: 10 MMOL/L (ref 5–15)
AST SERPL-CCNC: 8 U/L (ref 15–37)
BASOPHILS # BLD: 0.1 K/UL (ref 0–0.1)
BASOPHILS NFR BLD: 1 % (ref 0–1)
BILIRUB SERPL-MCNC: 0.4 MG/DL (ref 0.2–1)
BUN SERPL-MCNC: 37 MG/DL (ref 6–20)
BUN/CREAT SERPL: 19 (ref 12–20)
CALCIUM SERPL-MCNC: 8.4 MG/DL (ref 8.5–10.1)
CHLORIDE SERPL-SCNC: 111 MMOL/L (ref 97–108)
CO2 SERPL-SCNC: 17 MMOL/L (ref 21–32)
CREAT SERPL-MCNC: 1.96 MG/DL (ref 0.55–1.02)
DIFFERENTIAL METHOD BLD: ABNORMAL
EOSINOPHIL # BLD: 0.3 K/UL (ref 0–0.4)
EOSINOPHIL NFR BLD: 5 % (ref 0–7)
ERYTHROCYTE [DISTWIDTH] IN BLOOD BY AUTOMATED COUNT: 13.5 % (ref 11.5–14.5)
GLOBULIN SER CALC-MCNC: 4.3 G/DL (ref 2–4)
GLUCOSE BLD STRIP.AUTO-MCNC: 136 MG/DL (ref 65–100)
GLUCOSE BLD STRIP.AUTO-MCNC: 141 MG/DL (ref 65–100)
GLUCOSE BLD STRIP.AUTO-MCNC: 169 MG/DL (ref 65–100)
GLUCOSE BLD STRIP.AUTO-MCNC: 200 MG/DL (ref 65–100)
GLUCOSE SERPL-MCNC: 140 MG/DL (ref 65–100)
HCT VFR BLD AUTO: 29.8 % (ref 35–47)
HGB BLD-MCNC: 9.4 G/DL (ref 11.5–16)
IMM GRANULOCYTES # BLD AUTO: 0.1 K/UL (ref 0–0.04)
IMM GRANULOCYTES NFR BLD AUTO: 1 % (ref 0–0.5)
INR PPP: 1.3 (ref 0.9–1.1)
LYMPHOCYTES # BLD: 1.1 K/UL (ref 0.8–3.5)
LYMPHOCYTES NFR BLD: 15 % (ref 12–49)
MAGNESIUM SERPL-MCNC: 2.3 MG/DL (ref 1.6–2.4)
MCH RBC QN AUTO: 29.8 PG (ref 26–34)
MCHC RBC AUTO-ENTMCNC: 31.5 G/DL (ref 30–36.5)
MCV RBC AUTO: 94.6 FL (ref 80–99)
MONOCYTES # BLD: 0.8 K/UL (ref 0–1)
MONOCYTES NFR BLD: 10 % (ref 5–13)
NEUTS SEG # BLD: 5.1 K/UL (ref 1.8–8)
NEUTS SEG NFR BLD: 68 % (ref 32–75)
NRBC # BLD: 0 K/UL (ref 0–0.01)
NRBC BLD-RTO: 0 PER 100 WBC
PLATELET # BLD AUTO: 413 K/UL (ref 150–400)
PMV BLD AUTO: 9.8 FL (ref 8.9–12.9)
POTASSIUM SERPL-SCNC: 4.3 MMOL/L (ref 3.5–5.1)
PROT SERPL-MCNC: 6.8 G/DL (ref 6.4–8.2)
PROTHROMBIN TIME: 13.5 SEC (ref 9–11.1)
RBC # BLD AUTO: 3.15 M/UL (ref 3.8–5.2)
SERVICE CMNT-IMP: ABNORMAL
SODIUM SERPL-SCNC: 138 MMOL/L (ref 136–145)
WBC # BLD AUTO: 7.4 K/UL (ref 3.6–11)

## 2019-12-08 PROCEDURE — 85025 COMPLETE CBC W/AUTO DIFF WBC: CPT

## 2019-12-08 PROCEDURE — 65270000029 HC RM PRIVATE

## 2019-12-08 PROCEDURE — 36415 COLL VENOUS BLD VENIPUNCTURE: CPT

## 2019-12-08 PROCEDURE — 74011250637 HC RX REV CODE- 250/637: Performed by: INTERNAL MEDICINE

## 2019-12-08 PROCEDURE — 83735 ASSAY OF MAGNESIUM: CPT

## 2019-12-08 PROCEDURE — 85610 PROTHROMBIN TIME: CPT

## 2019-12-08 PROCEDURE — 74011636637 HC RX REV CODE- 636/637: Performed by: INTERNAL MEDICINE

## 2019-12-08 PROCEDURE — 77030040133 HC BLANKET THRM DISP CSZ -A

## 2019-12-08 PROCEDURE — 80053 COMPREHEN METABOLIC PANEL: CPT

## 2019-12-08 PROCEDURE — 82962 GLUCOSE BLOOD TEST: CPT

## 2019-12-08 PROCEDURE — 77030018846 HC SOL IRR STRL H20 ICUM -A

## 2019-12-08 PROCEDURE — 94760 N-INVAS EAR/PLS OXIMETRY 1: CPT

## 2019-12-08 PROCEDURE — 74011000258 HC RX REV CODE- 258: Performed by: INTERNAL MEDICINE

## 2019-12-08 PROCEDURE — 74011250636 HC RX REV CODE- 250/636: Performed by: INTERNAL MEDICINE

## 2019-12-08 PROCEDURE — 74011250636 HC RX REV CODE- 250/636: Performed by: EMERGENCY MEDICINE

## 2019-12-08 RX ORDER — WARFARIN 1 MG/1
3 TABLET ORAL ONCE
Status: COMPLETED | OUTPATIENT
Start: 2019-12-08 | End: 2019-12-08

## 2019-12-08 RX ADMIN — HYDRALAZINE HYDROCHLORIDE 100 MG: 50 TABLET, FILM COATED ORAL at 13:28

## 2019-12-08 RX ADMIN — HYDRALAZINE HYDROCHLORIDE 100 MG: 50 TABLET, FILM COATED ORAL at 22:16

## 2019-12-08 RX ADMIN — WARFARIN SODIUM 3 MG: 1 TABLET ORAL at 19:17

## 2019-12-08 RX ADMIN — DOXAZOSIN 4 MG: 2 TABLET ORAL at 22:16

## 2019-12-08 RX ADMIN — CEFEPIME 2 G: 2 INJECTION, POWDER, FOR SOLUTION INTRAVENOUS at 13:28

## 2019-12-08 RX ADMIN — SERTRALINE HYDROCHLORIDE 75 MG: 50 TABLET ORAL at 08:01

## 2019-12-08 RX ADMIN — Medication 10 ML: at 13:29

## 2019-12-08 RX ADMIN — VITAMIN D, TAB 1000IU (100/BT) 5 TABLET: 25 TAB at 08:01

## 2019-12-08 RX ADMIN — METOPROLOL SUCCINATE 200 MG: 50 TABLET, EXTENDED RELEASE ORAL at 08:01

## 2019-12-08 RX ADMIN — Medication 10 ML: at 06:36

## 2019-12-08 RX ADMIN — BUSPIRONE HYDROCHLORIDE 10 MG: 5 TABLET ORAL at 08:00

## 2019-12-08 RX ADMIN — INSULIN LISPRO 2 UNITS: 100 INJECTION, SOLUTION INTRAVENOUS; SUBCUTANEOUS at 17:35

## 2019-12-08 RX ADMIN — Medication 10 ML: at 22:00

## 2019-12-08 RX ADMIN — HYDRALAZINE HYDROCHLORIDE 100 MG: 50 TABLET, FILM COATED ORAL at 06:36

## 2019-12-08 RX ADMIN — CLONIDINE HYDROCHLORIDE 0.6 MG: 0.1 TABLET ORAL at 22:15

## 2019-12-08 RX ADMIN — VANCOMYCIN HYDROCHLORIDE 1250 MG: 10 INJECTION, POWDER, LYOPHILIZED, FOR SOLUTION INTRAVENOUS at 21:43

## 2019-12-08 RX ADMIN — POTASSIUM CHLORIDE 10 MEQ: 750 TABLET, FILM COATED, EXTENDED RELEASE ORAL at 08:01

## 2019-12-08 RX ADMIN — INSULIN GLARGINE 20 UNITS: 100 INJECTION, SOLUTION SUBCUTANEOUS at 08:00

## 2019-12-08 RX ADMIN — CYANOCOBALAMIN TAB 500 MCG 1000 MCG: 500 TAB at 08:01

## 2019-12-08 RX ADMIN — CEFEPIME 2 G: 2 INJECTION, POWDER, FOR SOLUTION INTRAVENOUS at 02:30

## 2019-12-08 RX ADMIN — INSULIN LISPRO 3 UNITS: 100 INJECTION, SOLUTION INTRAVENOUS; SUBCUTANEOUS at 13:28

## 2019-12-08 RX ADMIN — BUSPIRONE HYDROCHLORIDE 10 MG: 5 TABLET ORAL at 17:35

## 2019-12-08 NOTE — PROGRESS NOTES
Problem: Diabetes Self-Management Goal: *Disease process and treatment process Description Define diabetes and identify own type of diabetes; list 3 options for treating diabetes. Outcome: Progressing Towards Goal 
Goal: *Incorporating nutritional management into lifestyle Description Describe effect of type, amount and timing of food on blood glucose; list 3 methods for planning meals. Outcome: Progressing Towards Goal 
Goal: *Incorporating physical activity into lifestyle Description State effect of exercise on blood glucose levels. Outcome: Progressing Towards Goal 
Goal: *Developing strategies to promote health/change behavior Description Define the ABC's of diabetes; identify appropriate screenings, schedule and personal plan for screenings. Outcome: Progressing Towards Goal 
Goal: *Using medications safely Description State effect of diabetes medications on diabetes; name diabetes medication taking, action and side effects. Outcome: Progressing Towards Goal 
Goal: *Monitoring blood glucose, interpreting and using results Description Identify recommended blood glucose targets  and personal targets. Outcome: Progressing Towards Goal 
Goal: *Prevention, detection, treatment of acute complications Description List symptoms of hyper- and hypoglycemia; describe how to treat low blood sugar and actions for lowering  high blood glucose level. Outcome: Progressing Towards Goal 
Goal: *Prevention, detection and treatment of chronic complications Description Define the natural course of diabetes and describe the relationship of blood glucose levels to long term complications of diabetes. Outcome: Progressing Towards Goal 
Goal: *Developing strategies to address psychosocial issues Description Describe feelings about living with diabetes; identify support needed and support network Outcome: Progressing Towards Goal 
Goal: *Insulin pump training Outcome: Progressing Towards Goal 
 Goal: *Sick day guidelines Outcome: Progressing Towards Goal 
Goal: *Patient Specific Goal (EDIT GOAL, INSERT TEXT) Outcome: Progressing Towards Goal 
  
Problem: Patient Education: Go to Patient Education Activity Goal: Patient/Family Education Outcome: Progressing Towards Goal 
  
Problem: Falls - Risk of 
Goal: *Absence of Falls Description Document Clifford Mccrary Fall Risk and appropriate interventions in the flowsheet. Outcome: Progressing Towards Goal 
Note: Fall Risk Interventions: 
  
 
  
 
Medication Interventions: Assess postural VS orthostatic hypotension, Evaluate medications/consider consulting pharmacy Problem: Patient Education: Go to Patient Education Activity Goal: Patient/Family Education Outcome: Progressing Towards Goal

## 2019-12-08 NOTE — PROGRESS NOTES
Reason for Admission:   Right lower extremity cellulitis RRAT Score:     29 High Risk Resources/supports as identified by patient/family:   Pt has supportive family Top Challenges facing patient (as identified by patient/family and CM): Finances/Medication cost?      No issues with finances/medication cost. Pt. Has TRISTAN Headley of [address] Transportation? Pt does drive Support system or lack thereof? Pt has support system Living arrangements? Pt lives with  in single level home with ramp and 0 KATHI. Self-care/ADLs/Cognition? Pt is independent with ADL's and IADL's. Pt is alert and oriented. Current Advanced Directive/Advance Care Plan:  Pt is FULL code status. Pt does not have an ACP on file. CM discussed with pt about ACP. Pt stated she would not like ACP addressed at this time. Plan for utilizing home health:    Pt has had home health in the past. Pt is receptive to home health if needed at d/c. Transition of Care Plan:       
Home w/ family Follow-Up appointment 2nd IM Letter CM met with pt at bedside to discuss d/c plan. CM verified pt's demographics, insurance, and PCP. Pt is a 61 y/o female admitted to [facility] 12/[facility] for right lower extremity cellulitis. Pt PCP is Dr. Jefferson Valdivia. Pt sees MD every 3-4 months. Pt uses [address] [place] [address] in [place] for Rx. Pt lives with  in single level home with ramp and 0 KATHI. Pt is independent with ADL's and IADL's. Pt does drive. Pt has walker and cane. Pt has had home health in the past. No SNF or Acute inpatient rehab in the past. Pt is FULL code status. Pt does not have an ACP on file. Pt's  will provide transportation at time of discharge. Care Management Interventions PCP Verified by CM:  Yes 
 Palliative Care Criteria Met (RRAT>21 & CHF Dx)?: No 
Palliative Consult Recommended?: No 
Mode of Transport at Discharge: Other (see comment)( will provided transportation at discharge ) Transition of Care Consult (CM Consult): Discharge Planning MyChart Signup: No 
Discharge Durable Medical Equipment: No 
Physical Therapy Consult: No 
Occupational Therapy Consult: No 
Speech Therapy Consult: No 
Current Support Network: Lives with Spouse Confirm Follow Up Transport: Family Plan discussed with Pt/Family/Caregiver: Yes  
 
CM will continue to follow patient for discharge planning needs and arrange for services as deemed necessary. Nicho Goel, MSN, RN Care Manager  
ext. [phone]

## 2019-12-08 NOTE — PROGRESS NOTES
Bedside and verbal shift change report given to GURINDER Ball (oncoming nurse) by Leidy Gray RN (offgoing nurse). Report included the following information SBAR, Kardex, Intake/Output, MAR, Recent Results and Quality Measures.

## 2019-12-08 NOTE — PROGRESS NOTES
Pharmacy Automatic Renal Dosing Protocol - Antimicrobials Indication for Antimicrobials: SSTI Current Regimen of Each Antimicrobial: 
Vancomycin 2000 mg x 1 then 1250 mg Q24H (Start Date ; Day # 3) Cefepime 2g iv q24h (start date ; day 3) Previous Antimicrobial Therapy: 
Metronidazole 500 mg x 1 Ceftriaxone 1 gram x 1 Vancomycin Goal Level: 10-15 mcg/ml Vancomycin Levels Date Dose & Interval Measured (mcg/mL) Steady State (mcg/mL) Date & time of next level:  or 12/10 - not entered Significant Cultures:  
: paired blood - NG - pending Radiology / Imaging results: (X-ray, CT scan or MRI):  
 
 
Labs: 
Recent Labs 19 
0436 19 
0306 19 
1749 CREA 1.96* 1.99* 2.19* BUN 37* 32* 34* WBC 7.4 8.5 10.4 Temp (24hrs), Av °F (36.7 °C), Min:97.6 °F (36.4 °C), Max:98.5 °F (36.9 °C) Paralysis, amputations, malnutrition:  
Creatinine Clearance (mL/min) or Dialysis: 41.1 Impression/Plan: 
Cefepime renally adjusted to 2g IV q12h from  2g iv q24h per renal protocol. Continue Vancomycin dosing BMP daily Duration of therapy: Vancomycin TBD. Cefepime: , 7 days. Pharmacy will follow daily and adjust medications as appropriate for renal function and/or serum levels. Thank you, RICO FrazierD 
 
 
Recommended duration of therapy 
http://[facility]/[place]/[facility]/Pharmacy/Clinical%20Companion/Duration%20of%20ABX%20therapy. docx Renal Dosing 
[email or web address]/[facility]/Pharmacy/Clinical%20Companion/Renal%[email or web address]. pdf

## 2019-12-08 NOTE — PROGRESS NOTES
Hospitalist Progress Note NAME: Lina Rios :  1960 MRN:  [id number] Assessment / Plan: 
Right lower extremity cellulitis 
-Failed outpatient management with clindamycin x1 week 
-Doppler US negative for DVT 
-Patient currently without overt signs or symptoms of sepsis -Given presence of DM with foot ulcers 
-C/w Cefepime/Flagyl/Vancomycin, erythema is fading already but still indurated area in right  Thigh, apply hot pack Type II diabetes with bilateral foot ulcerations 
-Continue wound care 
-Continue Lantus per home routine 
-SSI AC/HS, HbA1C 6.7, so far controlled Coagulopathy due to anticoagulant use -INR 12 in setting of antibiotic use along with warfarin! 
-Currently with stable Hgb and no signs of ongoing bleeding 
-Given Vit K in ED, monitor INR. Today 1.3, resume Coumadin Atrial fibrillation on warfarin Continue metoprolol, resume Coumadin, may need just 2mg po daily at D/C History of bilateral breast cancer Essential hypertension Hyperlipidemia Obesity: BMI 37.08 s/p Bariatric Surgery, there is some lymphedema in legs. Surrogate Decision Maker: Charlotte Fay Code status: Full Prophylaxis: Coumadin Recommended Disposition: Home w/Family D/c plan likely for tomorrow Subjective: Chief Complaint / Reason for Physician Visit \"I feel OK\". Discussed with RN events overnight. Review of Systems: 
Symptom Y/N Comments  Symptom Y/N Comments Fever/Chills    Chest Pain Poor Appetite    Edema Cough    Abdominal Pain Sputum    Joint Pain y   
SOB/CHOW    Pruritis/Rash y   
Nausea/vomit    Tolerating PT/OT Diarrhea    Tolerating Diet y Constipation    Other Could NOT obtain due to:   
 
Objective: VITALS:  
Last 24hrs VS reviewed since prior progress note. Most recent are: 
Patient Vitals for the past 24 hrs: 
 Temp Pulse Resp BP SpO2  
19 0744 98.2 °F (36.8 °C) 62 16 129/52 97 % 12/08/19 0440 97.9 °F (36.6 °C) 67 16 150/66 97 % 12/08/19 0030 97.8 °F (36.6 °C) 61 16 139/64 96 % 12/07/19 1958 98.2 °F (36.8 °C) 60 17 134/62 97 % 12/07/19 1652 98 °F (36.7 °C) 60 16 140/51 100 % 12/07/19 1331 97.6 °F (36.4 °C) (!) 58 16 110/42 97 % No intake or output data in the 24 hours ending 12/08/19 1135 PHYSICAL EXAM: 
General: WD, WN. Alert, cooperative, no acute distress   
EENT:  EOMI. Anicteric sclerae. MMM Resp:  Coarse BS 
CV:  Regular  rhythm,  +  edema GI:  Soft, Non distended, Non tender.  +Bowel sounds Neurologic:  Alert and oriented X 3, normal speech, Psych:   Good insight. Not anxious nor agitated Skin:  +  rashes. No jaundice Reviewed most current lab test results and cultures  YES Reviewed most current radiology test results   YES Review and summation of old records today    NO Reviewed patient's current orders and MAR    YES 
PMH/ reviewed - no change compared to H&P 
________________________________________________________________________ Care Plan discussed with: 
  Comments Patient y Family RN y   
Care Manager Consultant Multidiciplinary team rounds were held today with , nursing, pharmacist and clinical coordinator. Patient's plan of care was discussed; medications were reviewed and discharge planning was addressed. ________________________________________________________________________ Total NON critical care TIME:  35   Minutes Total CRITICAL CARE TIME Spent:   Minutes non procedure based Comments >50% of visit spent in counseling and coordination of care y   
________________________________________________________________________ Sandy Almeida MD  
 
Procedures: see electronic medical records for all procedures/Xrays and details which were not copied into this note but were reviewed prior to creation of Plan. LABS: 
I reviewed today's most current labs and imaging studies. Pertinent labs include: 
Recent Labs 12/08/19 
0436 12/07/19 
0306 12/06/19 
1749 WBC 7.4 8.5 10.4 HGB 9.4* 9.0* 10.7* HCT 29.8* 29.2* 34.7* * 394 442* Recent Labs 12/08/19 
0436 12/07/19 
1325 12/07/19 
0306 12/06/19 
1749 12/06/19 
1745   --  138 137  --   
K 4.3  --  4.0 4.2  --   
*  --  108 106  --   
CO2 17*  --  23 25  --   
*  --  145* 141*  --   
BUN 37*  --  32* 34*  --   
CREA 1.96*  --  1.99* 2.19*  --   
CA 8.4*  --  8.2* 8.8  --   
MG 2.3  --   --  2.4  --   
ALB 2.5*  --   --   --   --   
TBILI 0.4  --   --   --   --   
SGOT 8*  --   --   --   --   
ALT 12  --   --   --   --   
INR 1.3* 1.8*  --   --  12.1* Signed: Meliton Keenan MD

## 2019-12-09 VITALS
WEIGHT: 251.1 LBS | OXYGEN SATURATION: 99 % | BODY MASS INDEX: 37.19 KG/M2 | SYSTOLIC BLOOD PRESSURE: 148 MMHG | TEMPERATURE: 97.7 F | HEIGHT: 69 IN | DIASTOLIC BLOOD PRESSURE: 60 MMHG | RESPIRATION RATE: 17 BRPM | HEART RATE: 67 BPM

## 2019-12-09 LAB
ANION GAP SERPL CALC-SCNC: 5 MMOL/L (ref 5–15)
BASOPHILS # BLD: 0.1 K/UL (ref 0–0.1)
BASOPHILS NFR BLD: 1 % (ref 0–1)
BUN SERPL-MCNC: 41 MG/DL (ref 6–20)
BUN/CREAT SERPL: 20 (ref 12–20)
CALCIUM SERPL-MCNC: 8.6 MG/DL (ref 8.5–10.1)
CHLORIDE SERPL-SCNC: 110 MMOL/L (ref 97–108)
CO2 SERPL-SCNC: 23 MMOL/L (ref 21–32)
CREAT SERPL-MCNC: 2.01 MG/DL (ref 0.55–1.02)
DIFFERENTIAL METHOD BLD: ABNORMAL
EOSINOPHIL # BLD: 0.3 K/UL (ref 0–0.4)
EOSINOPHIL NFR BLD: 4 % (ref 0–7)
ERYTHROCYTE [DISTWIDTH] IN BLOOD BY AUTOMATED COUNT: 13.4 % (ref 11.5–14.5)
GLUCOSE BLD STRIP.AUTO-MCNC: 148 MG/DL (ref 65–100)
GLUCOSE SERPL-MCNC: 147 MG/DL (ref 65–100)
HCT VFR BLD AUTO: 30.9 % (ref 35–47)
HGB BLD-MCNC: 9.4 G/DL (ref 11.5–16)
IMM GRANULOCYTES # BLD AUTO: 0.1 K/UL (ref 0–0.04)
IMM GRANULOCYTES NFR BLD AUTO: 1 % (ref 0–0.5)
INR PPP: 1.2 (ref 0.9–1.1)
LYMPHOCYTES # BLD: 1.1 K/UL (ref 0.8–3.5)
LYMPHOCYTES NFR BLD: 11 % (ref 12–49)
MCH RBC QN AUTO: 29.6 PG (ref 26–34)
MCHC RBC AUTO-ENTMCNC: 30.4 G/DL (ref 30–36.5)
MCV RBC AUTO: 97.2 FL (ref 80–99)
MONOCYTES # BLD: 0.8 K/UL (ref 0–1)
MONOCYTES NFR BLD: 9 % (ref 5–13)
NEUTS SEG # BLD: 7.2 K/UL (ref 1.8–8)
NEUTS SEG NFR BLD: 74 % (ref 32–75)
NRBC # BLD: 0 K/UL (ref 0–0.01)
NRBC BLD-RTO: 0 PER 100 WBC
PLATELET # BLD AUTO: 449 K/UL (ref 150–400)
PMV BLD AUTO: 9.9 FL (ref 8.9–12.9)
POTASSIUM SERPL-SCNC: 4.5 MMOL/L (ref 3.5–5.1)
PROTHROMBIN TIME: 12.4 SEC (ref 9–11.1)
RBC # BLD AUTO: 3.18 M/UL (ref 3.8–5.2)
SERVICE CMNT-IMP: ABNORMAL
SODIUM SERPL-SCNC: 138 MMOL/L (ref 136–145)
WBC # BLD AUTO: 9.7 K/UL (ref 3.6–11)

## 2019-12-09 PROCEDURE — 85610 PROTHROMBIN TIME: CPT

## 2019-12-09 PROCEDURE — 94760 N-INVAS EAR/PLS OXIMETRY 1: CPT

## 2019-12-09 PROCEDURE — 80048 BASIC METABOLIC PNL TOTAL CA: CPT

## 2019-12-09 PROCEDURE — 74011636637 HC RX REV CODE- 636/637: Performed by: INTERNAL MEDICINE

## 2019-12-09 PROCEDURE — 82962 GLUCOSE BLOOD TEST: CPT

## 2019-12-09 PROCEDURE — 74011000258 HC RX REV CODE- 258: Performed by: INTERNAL MEDICINE

## 2019-12-09 PROCEDURE — 74011250636 HC RX REV CODE- 250/636: Performed by: INTERNAL MEDICINE

## 2019-12-09 PROCEDURE — 85025 COMPLETE CBC W/AUTO DIFF WBC: CPT

## 2019-12-09 PROCEDURE — 36415 COLL VENOUS BLD VENIPUNCTURE: CPT

## 2019-12-09 PROCEDURE — 74011250637 HC RX REV CODE- 250/637: Performed by: INTERNAL MEDICINE

## 2019-12-09 RX ORDER — WARFARIN 2 MG/1
2 TABLET ORAL DAILY
Qty: 30 TAB | Refills: 1 | Status: SHIPPED | OUTPATIENT
Start: 2019-12-09 | End: 2019-12-12

## 2019-12-09 RX ORDER — LEVOFLOXACIN 750 MG/1
750 TABLET ORAL
Qty: 5 TAB | Refills: 0 | Status: SHIPPED | OUTPATIENT
Start: 2019-12-09 | End: 2019-12-18

## 2019-12-09 RX ORDER — WARFARIN 2 MG/1
2 TABLET ORAL DAILY
Qty: 30 TAB | Refills: 1 | Status: SHIPPED | OUTPATIENT
Start: 2019-12-09 | End: 2019-12-09 | Stop reason: SDUPTHER

## 2019-12-09 RX ORDER — HYDROCODONE BITARTRATE AND ACETAMINOPHEN 5; 325 MG/1; MG/1
1 TABLET ORAL
Qty: 20 TAB | Refills: 0 | Status: SHIPPED | OUTPATIENT
Start: 2019-12-09 | End: 2019-12-14

## 2019-12-09 RX ADMIN — POTASSIUM CHLORIDE 10 MEQ: 750 TABLET, FILM COATED, EXTENDED RELEASE ORAL at 08:53

## 2019-12-09 RX ADMIN — INSULIN GLARGINE 20 UNITS: 100 INJECTION, SOLUTION SUBCUTANEOUS at 08:53

## 2019-12-09 RX ADMIN — SERTRALINE HYDROCHLORIDE 75 MG: 50 TABLET ORAL at 08:51

## 2019-12-09 RX ADMIN — VITAMIN D, TAB 1000IU (100/BT) 5 TABLET: 25 TAB at 08:52

## 2019-12-09 RX ADMIN — CYANOCOBALAMIN TAB 500 MCG 1000 MCG: 500 TAB at 08:53

## 2019-12-09 RX ADMIN — METOPROLOL SUCCINATE 200 MG: 50 TABLET, EXTENDED RELEASE ORAL at 08:52

## 2019-12-09 RX ADMIN — CEFEPIME 2 G: 2 INJECTION, POWDER, FOR SOLUTION INTRAVENOUS at 03:14

## 2019-12-09 RX ADMIN — BUSPIRONE HYDROCHLORIDE 10 MG: 5 TABLET ORAL at 08:53

## 2019-12-09 RX ADMIN — HYDRALAZINE HYDROCHLORIDE 100 MG: 50 TABLET, FILM COATED ORAL at 06:56

## 2019-12-09 RX ADMIN — Medication 10 ML: at 06:57

## 2019-12-09 NOTE — DISCHARGE INSTRUCTIONS
Patient Education        Cellulitis: Care Instructions  Your Care Instructions    Cellulitis is a skin infection caused by bacteria, most often strep or staph. It often occurs after a break in the skin from a scrape, cut, bite, or puncture, or after a rash. Cellulitis may be treated without doing tests to find out what caused it. But your doctor may do tests, if needed, to look for a specific bacteria, like methicillin-resistant Staphylococcus aureus (MRSA). The doctor has checked you carefully, but problems can develop later. If you notice any problems or new symptoms, get medical treatment right away. Follow-up care is a key part of your treatment and safety. Be sure to make and go to all appointments, and call your doctor if you are having problems. It's also a good idea to know your test results and keep a list of the medicines you take. How can you care for yourself at home? · Take your antibiotics as directed. Do not stop taking them just because you feel better. You need to take the full course of antibiotics. · Prop up the infected area on pillows to reduce pain and swelling. Try to keep the area above the level of your heart as often as you can. · If your doctor told you how to care for your wound, follow your doctor's instructions. If you did not get instructions, follow this general advice:  ? Wash the wound with clean water 2 times a day. Don't use hydrogen peroxide or alcohol, which can slow healing. ? You may cover the wound with a thin layer of petroleum jelly, such as Vaseline, and a nonstick bandage. ? Apply more petroleum jelly and replace the bandage as needed. · Be safe with medicines. Take pain medicines exactly as directed. ? If the doctor gave you a prescription medicine for pain, take it as prescribed. ? If you are not taking a prescription pain medicine, ask your doctor if you can take an over-the-counter medicine.   To prevent cellulitis in the future  · Try to prevent cuts, scrapes, or other injuries to your skin. Cellulitis most often occurs where there is a break in the skin. · If you get a scrape, cut, mild burn, or bite, wash the wound with clean water as soon as you can to help avoid infection. Don't use hydrogen peroxide or alcohol, which can slow healing. · If you have swelling in your legs (edema), support stockings and good skin care may help prevent leg sores and cellulitis. · Take care of your feet, especially if you have diabetes or other conditions that increase the risk of infection. Wear shoes and socks. Do not go barefoot. If you have athlete's foot or other skin problems on your feet, talk to your doctor about how to treat them. When should you call for help? Call your doctor now or seek immediate medical care if:    · You have signs that your infection is getting worse, such as:  ? Increased pain, swelling, warmth, or redness. ? Red streaks leading from the area. ? Pus draining from the area. ? A fever.     · You get a rash.    Watch closely for changes in your health, and be sure to contact your doctor if:    · You do not get better as expected. Where can you learn more? Go to http://dona-windy.info/. Rogers Zazueta in the search box to learn more about \"Cellulitis: Care Instructions. \"  Current as of: 2019  Content Version: 12.2  © 5939-5689 [employer]. Care instructions adapted under license by [employer] (which disclaims liability or warranty for this information). If you have questions about a medical condition or this instruction, always ask your healthcare professional. [place] [address] any warranty or liability for your use of this information.              HOSPITALIST DISCHARGE INSTRUCTIONS  NAME: Spencer Singer   :  1960   MRN:  [id number]     Date/Time:  2019 9:01 AM    ADMIT DATE: 2019     DISCHARGE DATE: 2019     DIAGNOSIS:  Right Leg Cellulitis, DM, Coagulopathy, CKD, H/O A, Fib, H/O Bariatric Surgery, H/O Breast Cancer, HTN, Hyperlipidemia    MEDICATIONS:                As per medication reconciliation    · It is important that you take the medication exactly as they are prescribed. · Keep your medication in the bottles provided by the pharmacist and keep a list of the medication names, dosages, and times to be taken in your wallet. · Do not take other medications without consulting your doctor. Pain Management: per above medications    What to do at Home    Recommended diet:  Cardiac Diet and Diabetic Diet    Recommended activity: Activity as tolerated and No driving while on analgesics    If you experience any of the following symptoms then please call your primary care physician or return to the emergency room if you cannot get hold of your doctor:  Fever, chills, nausea, vomiting, diarrhea, change in mentation, falling, bleeding, shortness of breath. Follow Up:   PCP you are to call and set up an appointment to see them in 1 week. Information obtained by :  I understand that if any problems occur once I am at home I am to contact my physician. I understand and acknowledge receipt of the instructions indicated above.                                                                                                                                            Physician's or R.N.'s Signature                                                                  Date/Time                                                                                                                                              Patient or Representative Signature                                                          Date/Time

## 2019-12-09 NOTE — PROGRESS NOTES
Bedside and Verbal shift change report given to Jason Hall (oncoming nurse) by Xiomara (offgoing nurse). Report included the following information SBAR, Kardex, Procedure Summary, Intake/Output, Recent Results and Cardiac Rhythm .

## 2019-12-09 NOTE — PROGRESS NOTES
TEE PLAN Plan is home with family.  to transport Pt home in car around 11-12 today. CM Specialist made PCP appt and placed infomration on AVS.  
 
Pt will resume with Outpatient Josselyn Souza for management of wounds No further CM needs. Care Management Interventions PCP Verified by CM: Yes 
Palliative Care Criteria Met (RRAT>21 & CHF Dx)?: No 
Palliative Consult Recommended?: No 
Mode of Transport at Discharge: Other (see comment) Transition of Care Consult (CM Consult): Discharge Planning MyChart Signup: No 
Discharge Durable Medical Equipment: No 
Physical Therapy Consult: No 
Occupational Therapy Consult: No 
Speech Therapy Consult: No 
Current Support Network: Lives with Spouse, Own Home Confirm Follow Up Transport: Family Plan discussed with Pt/Family/Caregiver: Yes Freedom of Choice Offered: Yes Discharge Location Discharge Placement: Home with family assistance Johnnie Truong CM Ext [phone]

## 2019-12-09 NOTE — ROUTINE PROCESS
The following appointments have been successfully scheduled: 
 
Date/time Thursday, December 12, 2019 01:45 PM 
Patient  Julieta Kolb 1960 [address] [address] Department [facility] OFFICE-[facility] Appointment type Transitional Care Provider Bry Lyon

## 2019-12-09 NOTE — DISCHARGE SUMMARY
Hospitalist Discharge Summary Patient ID: 
Evelyne Han [id number] 
73 y.o. 
1960 12/6/2019 PCP on record: Elise Calloway MD 
 
Admit date: 12/6/2019 Discharge date and time: 12/9/2019 DISCHARGE DIAGNOSIS: 
 
Right Leg Cellulitis, DM, Coagulopathy, CKD, H/O A, Fib, H/O Bariatric Surgery, H/O Breast Cancer, HTN, Hyperlipidemia CONSULTATIONS: 
None Excerpted HPI from H&P of Sarah Weller MD: 
Claritza Navarrete is a 60 y.o.  female who presents with redness, swelling, and pain of the right thigh. She endorses a history of many episodes of cellulitis in large part because she has chronic ulcers of the bilateral lower extremities. She says that she started to feel sick 1 week ago with malaise and subjective fever and because she figured that she was becoming ill, she started taking clindamycin that she'd had from a prior illness. That seemed to help initially, but then on Wednesday she noted that her right calf and thigh were becoming red and painful. Despite continuing to use the clindamycin, that redness and pain spread. She contacted her wound care specialist and was seen in clinic today and in light of the spreading of erythema and pain despite clindamycin use, she was referred to the ED. She believes her last fever was 1 week ago. She has drainage from her foot ulcers but not from the leg. She denies nausea, vomiting, diarrhea. We were asked to admit for work up and evaluation of the above problems. ______________________________________________________________________ DISCHARGE SUMMARY/HOSPITAL COURSE:  for full details see H&P, daily progress notes, labs, consult notes. Right lower extremity cellulitis 
-Failed outpatient management with clindamycin x1 week 
-Doppler US negative for DVT 
-Patient currently without overt signs or symptoms of sepsis -Given presence of DM with foot ulcers -C/w Cefepime/Vancomycin, erythema is fading already but still indurated area in right  Thigh, apply hot pack Erythema has resolved, will D/c on  LVQ for 5 more doses Type II diabetes with bilateral foot ulcerations 
-Continue wound care 
-Continue Lantus per home routine 
-SSI AC/HS, HbA1C 6.7, so far controlled Coagulopathy due to anticoagulant use -INR 12 in setting of antibiotic use along with warfarin! 
-Currently with stable Hgb and no signs of ongoing bleeding 
-Given Vit K in ED, monitor INR. Today 1.3, resume Coumadin Atrial fibrillation on warfarin Continue metoprolol, resume Coumadin, may need just 2mg po daily at D/C History of bilateral breast cancer CKD 3: seems around baseline, monitor Essential hypertension Hyperlipidemia Obesity: BMI 37.08 s/p Bariatric Surgery, there is some lymphedema in legs. Surrogate Decision Maker: Adonis Randolph  Code status: Full Prophylaxis: Coumadin Recommended Disposition: Home w/Family  
  
Dc/ Home and F/U with PCP 
_______________________________________________________________________ Patient seen and examined by me on discharge day. Pertinent Findings: 
Gen:    Not in distress Chest: Clear lungs CVS:   Regular rhythm. No edema Abd:  Soft, not distended, not tender Neuro:  Alert, GCS 15 
_______________________________________________________________________ DISCHARGE MEDICATIONS:  
Current Discharge Medication List  
  
START taking these medications Details  
levoFLOXacin (LEVAQUIN) 750 mg tablet Take 1 Tab by mouth every fourty-eight (48) hours for 5 doses. Qty: 5 Tab, Refills: 0 HYDROcodone-acetaminophen (NORCO) 5-325 mg per tablet Take 1 Tab by mouth every six (6) hours as needed for Pain for up to 5 days. Max Daily Amount: 4 Tabs. Qty: 20 Tab, Refills: 0 Associated Diagnoses: Cellulitis of right lower extremity CONTINUE these medications which have CHANGED Details warfarin (COUMADIN) 2 mg tablet Take 1 Tab by mouth daily. Qty: 30 Tab, Refills: 1 Associated Diagnoses: PAF (paroxysmal atrial fibrillation) ([address] CONTINUE these medications which have NOT CHANGED Details  
insulin glargine (LANTUS U-100 INSULIN) 100 unit/mL injection Inject 20 units daily 
Qty: 10 mL, Refills: 5  
  
hydrALAZINE (APRESOLINE) 100 mg tablet TAKE ONE TABLET BY MOUTH THREE TIMES A DAY Qty: 90 Tab, Refills: 11 sertraline (ZOLOFT) 50 mg tablet TAKE ONE AND ONE-HALF (1 & 1/2) TABLET BY MOUTH DAILY Qty: 45 Tab, Refills: 5 Associated Diagnoses: Anxiety as acute reaction to gross stress  
  
bumetanide (BUMEX) 1 mg tablet Take 2 mg by mouth daily. cloNIDine HCl (CATAPRES) 0.3 mg tablet Take 0.6 mg by mouth nightly. metoprolol succinate (TOPROL-XL) 100 mg tablet TAKE TWO TABLETS BY MOUTH DAILY Qty: 60 Tab, Refills: 9  
  
doxazosin (CARDURA) 4 mg tablet TAKE ONE TABLET BY MOUTH ONCE NIGHTLY Qty: 30 Tab, Refills: 10  
 Associated Diagnoses: Essential hypertension  
  
busPIRone (BUSPAR) 10 mg tablet TAKE ONE TABLET BY MOUTH TWICE A DAY Qty: 60 Tab, Refills: 10  
 Associated Diagnoses: Anxiety as acute reaction to gross stress  
  
metolazone (ZAROXOLYN) 5 mg tablet Take 1 Tab by mouth daily as needed (take if develop increased LE edema, weight gain > 5 pounds. ). Qty: 30 Tab, Refills: 1 cholecalciferol, VITAMIN D3, (VITAMIN D3) 5,000 unit tab tablet Take 5,000 Units by mouth daily. vitamin A 8,000 unit capsule Take 8,000 Units by mouth daily. insulin lispro (HUMALOG) 100 unit/mL kwikpen 2 units with dinner for sugars over 200 Qty: 1 Package, Refills: 0  
  
cyanocobalamin (VITAMIN B-12) 1,000 mcg sublingual tablet Take 1,000 mcg by mouth daily. STOP taking these medications  
  
 potassium chloride SR (KLOR-CON 10) 10 mEq tablet Comments:  
Reason for Stopping:   
   
  
 
 
 
Patient Follow Up Instructions: Activity: Activity as tolerated and No driving while on analgesics Diet: Cardiac Diet and Diabetic Diet Wound Care: None needed Follow-up with PCP in 1 week. Follow-up tests/labs none Follow-up Information Follow up With Specialties Details Why Contact Kathy Hutchinson MD Internal Medicine In 1 week  [address] [place] [address] [phone] 
  
  
 
________________________________________________________________ Risk of deterioration: Moderate Condition at Discharge:  Stable 
__________________________________________________________________ Disposition Home with family, no needs 
 
____________________________________________________________________ Code Status: Full Code 
___________________________________________________________________ Total time in minutes spent coordinating this discharge (includes going over instructions, follow-up, prescriptions, and preparing report for sign off to her PCP) :  35 minutes Signed: 
Sharyle Saunders, MD

## 2019-12-11 ENCOUNTER — PATIENT OUTREACH (OUTPATIENT)
Dept: INTERNAL MEDICINE CLINIC | Facility: CLINIC | Age: 59
End: 2019-12-11

## 2019-12-11 PROBLEM — S81.811D LACERATION OF RIGHT LEG EXCLUDING THIGH, SUBSEQUENT ENCOUNTER: Status: RESOLVED | Noted: 2018-12-28 | Resolved: 2019-12-11

## 2019-12-11 PROBLEM — L03.90 CELLULITIS: Status: RESOLVED | Noted: 2019-12-06 | Resolved: 2019-12-11

## 2019-12-11 LAB
BACTERIA SPEC CULT: NORMAL
SERVICE CMNT-IMP: NORMAL

## 2019-12-11 RX ORDER — POTASSIUM CHLORIDE 750 MG/1
10 TABLET, FILM COATED, EXTENDED RELEASE ORAL DAILY
COMMUNITY
End: 2020-03-22

## 2019-12-11 NOTE — PROGRESS NOTES
HISTORY OF PRESENT ILLNESS Lloyd Pimentel is a 60 y.o. female. HPI  Ms. Jacque Barker is a 60y.o. year old female, she is seen today for Transition of Care services following a hospital discharge for cellulitis on Dec 9..  Our office Nurse Navigator performed an outreach to Ms. Wolfgang Guerrero on Dec 11 (within 2 business days of discharge) to complete medication reconciliation and a telephonic assessment of her condition. She was admitted on Dec 6 for failure of outpatient therapy for cellulitis in the right thigh. Malaise, and subjective fever began a week prior. She took clindamycin that seemed to help initially, but then developed worsening pain and erythema of right thigh and calf. Venous doppler was negative fro DVT. She was treated with cefepime and Vancomycin and had rapid improvement. She was discharged to complete 5 days of levofloxacin. There was no change in diabetic ulcers on feet. Warfarin was adjusted to 2 mg daily. INR at discharge was 1.2 she takes warfarin for A fib Erythema has resolved but leg is still swollen and sore in posterior thight and calf . Since discharge she denies fever, chills. Home blood sugars are under 200. She will follow up with wound clinic for diabetic ulcers on both feet. .  
 
 
Patient Active Problem List  
Diagnosis Code  History of breast cancer Z85.3  Asthma J45.909  Fe deficiency anemia D50.9  Status post ablation of atrial fibrillation Z98.890, Z86.79  
 Sick sinus syndrome ([facility]) I49.5  S/P cardiac pacemaker procedure Z95.0  Long term (current) use of anticoagulants Z79.01  
 Mixed hyperlipidemia E78.2  CKD (chronic kidney disease), stage IV ([facility]) N18.4  Systolic murmur L10.7  Essential hypertension I10  
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  
 Diabetic ulcer of right midfoot associated with type 2 diabetes mellitus, with fat layer exposed ([facility] E11.621, N08.035  Cellulitis of right lower extremity L03.115  Foot deformity, acquired, left M21.962  Foot deformity, right M21.961  Pes cavus Q66.70  Type 2 diabetes mellitus with left diabetic foot infection ([facility]) E11.628, L08.9 Past Medical History:  
Diagnosis Date  Acquired lymphedema Right arm  Arrhythmia  Asthma  Atrial fibrillation ([facility] Dr. Alicia Fournier and Dr. Joseph Jacinto [facility])  Cancer ([facility] bilateral breast  
 Chronic kidney disease  Diabetes mellitus type II   
 Dizziness  Essential hypertension  Hyperlipidemia  Hypertension  Long term (current) use of anticoagulants  Morbid obesity ([facility] 3/14/2012  Osteoarthritis  Pacemaker  Sick sinus syndrome ([facility] Past Surgical History:  
Procedure Laterality Date  ABDOMEN SURGERY PROC UNLISTED    
 gastric bypass  GASTRIC BYPASS,OBESE<150CM SAW-EN-Y  7/08  
 gu[place]ito  HX AFIB ABLATION    
 HX CARPAL TUNNEL RELEASE [address] RIGHT MODIFIED RADICAL   
 HX MOHS PROCEDURES  1995  
 right  HX ORTHOPAEDIC    
 ight knee surgery  HX PACEMAKER    
 IR INSERT TUNL CVC W PORT OVER 5 YEARS    
 LAMINECTOMY,CERVICAL  1994  
 NEEDLE BIOPSY LIVER,W OTHR [address]  8.21.07  
 [address] AND 1994  AR TRABECULOPLASTY BY LASER SURGERY    
 US GUIDE ASP OVARIAN CYST ABD APPROACH  8.21.07  
 RIGHT Social History Socioeconomic History  Marital status:  Spouse name: Not on file  Number of children: Not on file  Years of education: Not on file  Highest education level: Not on file Tobacco Use  Smoking status: Never Smoker  Smokeless tobacco: Never Used Substance and Sexual Activity  Alcohol use: Yes Comment: rare  Drug use: No  
 
Family History Problem Relation Age of Onset  Cancer Mother  Heart Disease Mother  Alcohol abuse Father  Diabetes Brother  Hypertension Brother Allergies Allergen Reactions  Ace Inhibitors Cough  Amlodipine Swelling  Avapro [Irbesartan] Unable to Obtain  Bactrim [Sulfamethoxazole-Trimethoprim] Other (comments) Sore mouth  Captopril Cough  Carvedilol Diarrhea  Contrast Agent [Iodine] Itching [address]  Morphine Nausea and Vomiting and Swelling  Penicillins Hives  Pravachol [Pravastatin] Nausea Only  Seafood [Shellfish Containing Products] Hives  Singulair [Montelukast] Other (comments)  
  palpitations Current Outpatient Medications Medication Sig Dispense Refill  potassium chloride SR (KLOR-CON 10) 10 mEq tablet Take 10 mEq by mouth daily. Indications: Patient states she intends to take this medication untl she discusses with Dr. Demetrius Randle on 12-12-19.  levoFLOXacin (LEVAQUIN) 750 mg tablet Take 1 Tab by mouth every fourty-eight (48) hours for 5 doses. 5 Tab 0  
 HYDROcodone-acetaminophen (NORCO) 5-325 mg per tablet Take 1 Tab by mouth every six (6) hours as needed for Pain for up to 5 days.  Max Daily Amount: 4 Tabs. 20 Tab 0  
 warfarin (COUMADIN) 2 mg tablet Take 1 Tab by mouth daily. 30 Tab 1  
 insulin glargine (LANTUS U-100 INSULIN) 100 unit/mL injection Inject 20 units daily 10 mL 5  
 hydrALAZINE (APRESOLINE) 100 mg tablet TAKE ONE TABLET BY MOUTH THREE TIMES A DAY 90 Tab 11  
 sertraline (ZOLOFT) 50 mg tablet TAKE ONE AND ONE-HALF (1 & 1/2) TABLET BY MOUTH DAILY 45 Tab 5  
 bumetanide (BUMEX) 1 mg tablet Take 2 mg by mouth daily.  cloNIDine HCl (CATAPRES) 0.3 mg tablet Take 0.6 mg by mouth nightly.  metoprolol succinate (TOPROL-XL) 100 mg tablet TAKE TWO TABLETS BY MOUTH DAILY 60 Tab 9  
 doxazosin (CARDURA) 4 mg tablet TAKE ONE TABLET BY MOUTH ONCE NIGHTLY 30 Tab 10  
 busPIRone (BUSPAR) 10 mg tablet TAKE ONE TABLET BY MOUTH TWICE A DAY 60 Tab 10  
 metolazone (ZAROXOLYN) 5 mg tablet Take 1 Tab by mouth daily as needed (take if develop increased LE edema, weight gain > 5 pounds. ). 30 Tab 1  cholecalciferol, VITAMIN D3, (VITAMIN D3) 5,000 unit tab tablet Take 5,000 Units by mouth daily.  vitamin A 8,000 unit capsule Take 8,000 Units by mouth daily.  insulin lispro (HUMALOG) 100 unit/mL kwikpen 2 units with dinner for sugars over 200 1 Package 0  
 cyanocobalamin (VITAMIN B-12) 1,000 mcg sublingual tablet Take 1,000 mcg by mouth daily. ROS Visit Vitals /62 (BP 1 Location: Left arm, BP Patient Position: Sitting) Pulse 80 Temp 98 °F (36.7 °C) (Oral) Resp 12 Ht 5' 9\" (1.753 m) Wt 263 lb (119.3 kg) SpO2 95% BMI 38.84 kg/m² Physical Exam 
Vitals signs and nursing note reviewed. Constitutional:   
   Appearance: Normal appearance. HENT:  
   Head: Normocephalic and atraumatic. Mouth/Throat:  
   Mouth: Mucous membranes are moist.  
Eyes:  
   Conjunctiva/sclera: Conjunctivae normal.  
Neck: Musculoskeletal: Neck supple. Cardiovascular:  
   Rate and Rhythm: Normal rate and regular rhythm. Heart sounds: Normal heart sounds. Heart sounds not distant. No murmur. No gallop. Pulmonary:  
   Effort: Pulmonary effort is normal.  
   Breath sounds: Normal breath sounds and air entry. No wheezing, rhonchi or rales. Musculoskeletal:  
   Right lower leg: No edema. Left lower leg: No edema. Lymphadenopathy:  
   Cervical: No cervical adenopathy. Skin: 
   General: Skin is warm and dry. Findings: No rash. Neurological:  
   Mental Status: She is alert and oriented to person, place, and time. Psychiatric:     
   Mood and Affect: Mood normal.     
   Behavior: Behavior normal. Behavior is cooperative. Lab Results Component Value Date/Time WBC 9.7 12/09/2019 01:42 AM  
 Hemoglobin (POC) 13.3 09/09/2011 08:34 AM  
 HGB 9.4 (L) 12/09/2019 01:42 AM  
 Hematocrit (POC) 39 09/09/2011 08:34 AM  
 HCT 30.9 (L) 12/09/2019 01:42 AM  
 PLATELET 383 (H) 77/78/3794 01:42 AM  
 MCV 97.2 12/09/2019 01:42 AM  
 
Lab Results Component Value Date/Time INR 1.2 (H) 12/09/2019 01:42 AM  
 INR 1.3 (H) 12/08/2019 04:36 AM  
 INR 1.8 (H) 12/07/2019 01:25 PM  
 Prothrombin time 12.4 (H) 12/09/2019 01:42 AM  
 Prothrombin time 13.5 (H) 12/08/2019 04:36 AM  
 Prothrombin time 18.0 (H) 12/07/2019 01:25 PM  
 
Lab Results Component Value Date/Time Hemoglobin A1c 6.7 (H) 11/11/2019 08:47 AM  
 Hemoglobin A1c (POC) 7.2 07/10/2019 03:14 PM  
 
Lab Results Component Value Date/Time  Sodium 138 12/09/2019 01:42 AM  
 Potassium 4.5 12/09/2019 01:42 AM  
 Chloride 110 (H) 12/09/2019 01:42 AM  
 CO2 23 12/09/2019 01:42 AM  
 Anion gap 5 12/09/2019 01:42 AM  
 Glucose 147 (H) 12/09/2019 01:42 AM  
 BUN 41 (H) 12/09/2019 01:42 AM  
 Creatinine 2.01 (H) 12/09/2019 01:42 AM  
 BUN/Creatinine ratio 20 12/09/2019 01:42 AM  
 GFR est AA 31 (L) 12/09/2019 01:42 AM  
 GFR est non-AA 25 (L) 12/09/2019 01:42 AM  
 Calcium 8.6 12/09/2019 01:42 AM  
 Bilirubin, total 0.4 12/08/2019 04:36 AM  
 AST (SGOT) 8 (L) 12/08/2019 04:36 AM  
 Alk. phosphatase 83 12/08/2019 04:36 AM  
 Protein, total 6.8 12/08/2019 04:36 AM  
 Albumin 2.5 (L) 12/08/2019 04:36 AM  
 Globulin 4.3 (H) 12/08/2019 04:36 AM  
 A-G Ratio 0.6 (L) 12/08/2019 04:36 AM  
 ALT (SGPT) 12 12/08/2019 04:36 AM  
 
 
 
ASSESSMENT and PLAN 
  ICD-10-CM ICD-9-CM 1. Medicare annual wellness visit, subsequent Z00.00 V70.0 2. Advance directive discussed with patient Z71.89 V65.49 3. Cellulitis of right lower extremity L03.115 682.6 4. PAF (paroxysmal atrial fibrillation) ([facility]) I48.0 427.31 AMB POC PT/INR  
   warfarin (COUMADIN) 4 mg tablet PROTHROMBIN TIME + INR  
5. Diabetic ulcer of left heel associated with type 2 diabetes mellitus, with necrosis of muscle ([facility]) E11.621 250.80 L97.423 707.14   
6. Diabetic ulcer of right midfoot associated with type 2 diabetes mellitus, with fat layer exposed ([address] E11.621 250.80 L97.412 707.14   
7. Essential hypertension I10 401.9 Diagnoses and all orders for this visit: 
 
1. Medicare annual wellness visit, subsequent 2. Advance directive discussed with patient 3. Cellulitis of right lower extremity Improved. Induration slow to resolve probably due to porr lymphatic drainage. 4. PAF (paroxysmal atrial fibrillation) ([address] -     AMB POC PT/INR--sub therapeutic 
-     Change warfarin (COUMADIN) 4 mg tablet; Take 1 tablet on Mon and Thurs and a half tablet the other 5 days. 
-     PROTHROMBIN TIME + INR; Future 5. Diabetic ulcer of left heel associated with type 2 diabetes mellitus, with necrosis of muscle ([address] Follow up with wound clinic. 6. Diabetic ulcer of right midfoot associated with type 2 diabetes mellitus, with fat layer exposed ([address] Follow up with wound clinic 7. Essential hypertension Difficult to control due to stage IV renal disease. Today's reading is good for her. Follow-up and Dispositions · Return for non-fasting lab in 3 weeks, not on a Fri . 
  
 
lab results and schedule of future lab studies reviewed with patient 
reviewed diet, exercise and weight control I have discussed the diagnosis, evaluation and treatment options and the intended plan with the patient. Patient understands and is in agreement. The patient has received an after-visit summary and questions were answered concerning future plans. I have discussed side effects and warnings of any new medications with the patient as well.

## 2019-12-11 NOTE — PROGRESS NOTES
Hospital Discharge Follow-Up Date/Time:  12/11/2019 2:25 PM 
 
Patient was admitted to [facility] on 12-6-19 and discharged on 12-9-19 for: 
 
DISCHARGE DIAGNOSIS: 
Right Leg Cellulitis, DM, Coagulopathy, CKD, H/O A, Fib, H/O Bariatric Surgery, H/O Breast Cancer, HTN, Hyperlipidemia The [facility] physician discharge summary was available at the time of outreach. Patient was contacted within two business days of discharge. Challenges reviewed with the provider:  
- Patient reports one fall in the last six months, with injury. States she had a bruised right eye, and the right side of her face was bruised and swollen. ED visit but not admitted for this fall.  
- Patient states she has one ulcer on her right foot and one ulcer on her left foot. - Patient has scheduled [facility] appointment for Friday, 12-13-19. Patient plans to visit wound care clinic at least once per week and patient states she changes her own dressings on the days she will not be visiting the wound care clinic.  
- Patient states she checks her home glucose levels once per day and the range is usually between 100-200.  
- Patient states she is taking Levaquin 750mg as ordered and has not missed any doses. - Patient states she is taking Potassium 10meq and plans to continue this medication until she discusses with Dr. Lanette Echavarria on 12-12/19. Advance Care Planning:  
Does patient have an Advance Directive:  not on file; education provided Component Latest Ref Rng & Units 12/9/2019 12/8/2019 12/8/2019 12/7/2019  
 
      1:42 AM  4:36 AM  4:36 AM  1:25 PM  
RBC 
    3.80 - 5.20 M/uL 3.18 (L)  3.15 (L) HGB 
    11.5 - 16.0 g/dL 9.4 (L)  9.4 (L) HCT 
    35.0 - 47.0 % 30.9 (L)  29.8 (L) Component Latest Ref Rng & Units 12/7/2019 12/6/2019  
 
      3:06 AM  5:49 PM  
RBC 
    3.80 - 5.20 M/uL 3.05 (L) 3.68 (L) HGB 
    11.5 - 16.0 g/dL 9.0 (L) 10.7 (L) HCT 
 35.0 - 47.0 % 29.2 (L) 34.7 (L) Component Latest Ref Rng & Units 12/9/2019 12/8/2019 12/8/2019 12/7/2019  
 
      1:42 AM  4:36 AM  4:36 AM  1:25 PM  
PLATELET 
    473 - 046 K/uL 449 (H)  413 (H) Component Latest Ref Rng & Units 12/7/2019 12/6/2019  
 
      3:06 AM  5:49 PM  
PLATELET 
    156 - 909 K/uL 394 442 (H) Component Latest Ref Rng & Units 12/9/2019 12/9/2019 12/8/2019 12/8/2019  
 
      1:42 AM  1:42 AM  4:36 AM  4:36 AM  
LYMPHOCYTES 
    12 - 49 %  11 (L)  15 Component Latest Ref Rng & Units 12/7/2019 12/7/2019 12/6/2019  
 
      1:25 PM  3:06 AM  5:49 PM  
LYMPHOCYTES 
    12 - 49 %  13 10 (L) Component Latest Ref Rng & Units 12/9/2019 12/8/2019 12/8/2019 12/7/2019  
 
      1:42 AM  4:36 AM  4:36 AM  1:25 PM  
IMMATURE GRANULOCYTES 
    0.0 - 0.5 % 1 (H)  1 (H) Component Latest Ref Rng & Units 12/7/2019 12/6/2019  
 
      3:06 AM  5:49 PM  
IMMATURE GRANULOCYTES 
    0.0 - 0.5 % 1 (H) 1 (H) Component Latest Ref Rng & Units 12/9/2019 12/8/2019 12/8/2019 12/7/2019  
 
      1:42 AM  4:36 AM  4:36 AM  1:25 PM  
ABS. IMM. GRANS. 0.00 - 0.04 K/UL 0.1 (H)  0.1 (H) Component Latest Ref Rng & Units 12/7/2019 12/6/2019  
 
      3:06 AM  5:49 PM  
ABS. IMM. GRANS. 0.00 - 0.04 K/UL 0.1 (H) 0.1 (H) Component Latest Ref Rng & Units 12/9/2019 12/9/2019 12/8/2019 12/8/2019  
 
      1:42 AM  1:42 AM  4:36 AM  4:36 AM  
INR 
    0.9 - 1.1   1.2 (H)  1.3 (H) Prothrombin time 9.0 - 11.1 sec 12.4 (H)  13.5 (H) Component Latest Ref Rng & Units 12/7/2019 12/7/2019 12/6/2019 12/6/2019  
 
      1:25 PM  3:06 AM  5:49 PM  5:45 PM  
INR 
    0.9 - 1.1   1.8 (H)   12.1 (HH) Prothrombin time 9.0 - 11.1 sec 18.0 (H)   108.0 (H) Component Latest Ref Rng & Units 12/9/2019 12/8/2019 12/8/2019 12/7/2019  
 
      1:42 AM  4:36 AM  4:36 AM  3:06 AM  
Chloride 97 - 108 mmol/L 110 (H) 111 (H)  108 CO2 
    21 - 32 mmol/L 23 17 (L)  23 Component Latest Ref Rng & Units 2019  
 
      1:42 AM  4:36 AM  4:36 AM  3:06 AM  
Glucose 65 - 100 mg/dL 147 (H) 140 (H)  145 (H) BUN 
    6 - 20 MG/DL 41 (H) 37 (H)  32 (H) Creatinine 
    0.55 - 1.02 MG/DL 2.01 (H) 1.96 (H)  1.99 (H) Component Latest Ref Rng & Units 2019  
 
      8:51 PM  6:14 PM  5:49 PM  5:49 PM  
Glucose 65 - 100 mg/dL    141 (H) BUN 
    6 - 20 MG/DL    34 (H) Creatinine 
    0.55 - 1.02 MG/DL    2.19 (H) Component Latest Ref Rng & Units 2019  
 
      1:42 AM  4:36 AM  4:36 AM  3:06 AM  
GFR est AA 
    >60 ml/min/1.73m2 31 (L) 32 (L)  31 (L)  
GFR est non-AA 
    >60 ml/min/1.73m2 25 (L) 26 (L)  26 (L) Calcium 8.5 - 10.1 MG/DL 8.6 8.4 (L)  8.2 (L) Component Latest Ref Rng & Units 2019  
 
      8:51 PM  6:14 PM  5:49 PM  5:49 PM  
GFR est AA 
    >60 ml/min/1.73m2    28 (L) GFR est non-AA 
    >60 ml/min/1.73m2    23 (L) Calcium 8.5 - 10.1 MG/DL    8.8 Component Latest Ref Rng & Units 2019  
 
      4:36 AM  
AST 
    15 - 37 U/L 8 (L) Component Latest Ref Rng & Units 2019  
 
      4:36 AM  
Albumin 3.5 - 5.0 g/dL 2.5 (L) Globulin 2.0 - 4.0 g/dL 4.3 (H) A-G Ratio 1.1 - 2.2   0.6 (L) Method of communication with provider :chart routing Inpatient RRAT score: 29 on 19. Was this a readmission? no  
Patient stated reason for the readmission: n/a Care Transition Nurse (CTN) contacted the patient by telephone to perform post hospital discharge assessment. Verified name and  with patient as identifiers. Provided introduction to self, and explanation of the CTN role. Patient received hospital discharge instructions.   CTN reviewed discharge instructions and red flags with patient who verbalized understanding. Patient given an opportunity to ask questions and does not have any further questions or concerns at this time. The patient agrees to contact the PCP office for questions related to their healthcare. CTN provided contact information for future reference. Disease Specific:   N/A Patients top risk factors for readmission:  medical condition Home Health orders at discharge: none [address]: n/a Date of initial visit: n/a Durable Medical Equipment ordered at discharge: none [address]: n/a Durable Medical Equipment received: n/a Medications per ED [facility] Discharge Summary dated 12-9-19:  
New Medications at Discharge: START taking these medications  
  Details  
levoFLOXacin (LEVAQUIN) 750 mg tablet Take 1 Tab by mouth every fourty-eight (48) hours for 5 doses. Qty: 5 Tab, Refills: 0  
   
HYDROcodone-acetaminophen (NORCO) 5-325 mg per tablet Take 1 Tab by mouth every six (6) hours as needed for Pain for up to 5 days. Max Daily Amount: 4 Tabs. Qty: 20 Tab, Refills: 0  
  Associated Diagnoses: Cellulitis of right lower extremity  
   
 
Changed Medications at Discharge: CONTINUE these medications which have CHANGED  
  Details  
warfarin (COUMADIN) 2 mg tablet Take 1 Tab by mouth daily. Qty: 30 Tab, Refills: 1  
  Associated Diagnoses: PAF (paroxysmal atrial fibrillation) ([address]  
   
 
Discontinued Medications at Discharge: STOP taking these medications  
   
  potassium chloride SR (KLOR-CON 10) 10 mEq tablet Comments:  
Reason for Stopping:   
 
Medication reconciliation was performed with patient, who verbalizes understanding of administration of home medications. There were no barriers to obtaining medications identified at this time and patient states her medications are affordable. Referral to Pharm D needed: no  
 
Current Outpatient Medications Medication Sig  
  potassium chloride SR (KLOR-CON 10) 10 mEq tablet Take 10 mEq by mouth daily. Indications: Patient states she intends to take this medication untl she discusses with Dr. Neisha Anguiano on 12-12-19.  levoFLOXacin (LEVAQUIN) 750 mg tablet Take 1 Tab by mouth every fourty-eight (48) hours for 5 doses.  HYDROcodone-acetaminophen (NORCO) 5-325 mg per tablet Take 1 Tab by mouth every six (6) hours as needed for Pain for up to 5 days. Max Daily Amount: 4 Tabs.  warfarin (COUMADIN) 2 mg tablet Take 1 Tab by mouth daily.  insulin glargine (LANTUS U-100 INSULIN) 100 unit/mL injection Inject 20 units daily  hydrALAZINE (APRESOLINE) 100 mg tablet TAKE ONE TABLET BY MOUTH THREE TIMES A DAY  sertraline (ZOLOFT) 50 mg tablet TAKE ONE AND ONE-HALF (1 & 1/2) TABLET BY MOUTH DAILY  bumetanide (BUMEX) 1 mg tablet Take 2 mg by mouth daily.  cloNIDine HCl (CATAPRES) 0.3 mg tablet Take 0.6 mg by mouth nightly.  metoprolol succinate (TOPROL-XL) 100 mg tablet TAKE TWO TABLETS BY MOUTH DAILY  doxazosin (CARDURA) 4 mg tablet TAKE ONE TABLET BY MOUTH ONCE NIGHTLY  busPIRone (BUSPAR) 10 mg tablet TAKE ONE TABLET BY MOUTH TWICE A DAY  metolazone (ZAROXOLYN) 5 mg tablet Take 1 Tab by mouth daily as needed (take if develop increased LE edema, weight gain > 5 pounds. ).  cholecalciferol, VITAMIN D3, (VITAMIN D3) 5,000 unit tab tablet Take 5,000 Units by mouth daily.  vitamin A 8,000 unit capsule Take 8,000 Units by mouth daily.  insulin lispro (HUMALOG) 100 unit/mL kwikpen 2 units with dinner for sugars over 200  cyanocobalamin (VITAMIN B-12) 1,000 mcg sublingual tablet Take 1,000 mcg by mouth daily. No current facility-administered medications for this visit. There are no discontinued medications. [facility] follow up appointment(s):  
Future Appointments Date Time Provider Gómez Lechuga 12/12/2019  1:45 PM Renee Mcintosh  [facility]  
 12/13/2019  9:45 AM Mirian Lay MD [facility] REG  
2/28/2020 11:00 AM Ever Ramirez  [facility] follow up appointment(s): None noted at this time. [facility]:  offered and patient declined Goals  Attends follow-up appointments as directed. 12-11-19: Patient has TEE appointment with Dr. Rigoberto Guaman. Yudy Rivera on 12-12-19 and patient states she has resumed wound care at the [address] [place] [address] and her next visit is scheduled for 12-13-19. Patient states she plans to visit the wound care clinic at least once per week. Milton Russo Understands red flags post discharge. 12-11-19: Red flags of cellulitis reviewed with patient and patient verbalizes an understanding. Patient has no red flags to report at this time and states she will be resuming care at the [facility] on 12-13-19. Patient states she does her own wound care on the days that she will not be visiting the wound care clinic and she plans to visit the clinic at least once per week. Patient reports she has an ulcer on her right and left foot. Patient reports she is taking Levaquin 750mg as ordered and has not missed any doses. Care Transitions Nurse will review red flags again on next phone conversation with patient.  Hank Chou

## 2019-12-12 ENCOUNTER — OFFICE VISIT (OUTPATIENT)
Dept: INTERNAL MEDICINE CLINIC | Facility: CLINIC | Age: 59
End: 2019-12-12

## 2019-12-12 VITALS
HEIGHT: 69 IN | WEIGHT: 263 LBS | SYSTOLIC BLOOD PRESSURE: 142 MMHG | HEART RATE: 80 BPM | OXYGEN SATURATION: 95 % | RESPIRATION RATE: 12 BRPM | BODY MASS INDEX: 38.95 KG/M2 | TEMPERATURE: 98 F | DIASTOLIC BLOOD PRESSURE: 62 MMHG

## 2019-12-12 DIAGNOSIS — L97.412 DIABETIC ULCER OF RIGHT MIDFOOT ASSOCIATED WITH TYPE 2 DIABETES MELLITUS, WITH FAT LAYER EXPOSED (HCC): ICD-10-CM

## 2019-12-12 DIAGNOSIS — Z71.89 ADVANCE DIRECTIVE DISCUSSED WITH PATIENT: ICD-10-CM

## 2019-12-12 DIAGNOSIS — E11.621 DIABETIC ULCER OF RIGHT MIDFOOT ASSOCIATED WITH TYPE 2 DIABETES MELLITUS, WITH FAT LAYER EXPOSED (HCC): ICD-10-CM

## 2019-12-12 DIAGNOSIS — I10 ESSENTIAL HYPERTENSION: ICD-10-CM

## 2019-12-12 DIAGNOSIS — E11.621 DIABETIC ULCER OF LEFT HEEL ASSOCIATED WITH TYPE 2 DIABETES MELLITUS, WITH NECROSIS OF MUSCLE (HCC): ICD-10-CM

## 2019-12-12 DIAGNOSIS — L97.423 DIABETIC ULCER OF LEFT HEEL ASSOCIATED WITH TYPE 2 DIABETES MELLITUS, WITH NECROSIS OF MUSCLE (HCC): ICD-10-CM

## 2019-12-12 DIAGNOSIS — I48.0 PAF (PAROXYSMAL ATRIAL FIBRILLATION) (HCC): ICD-10-CM

## 2019-12-12 DIAGNOSIS — L03.115 CELLULITIS OF RIGHT LOWER EXTREMITY: ICD-10-CM

## 2019-12-12 DIAGNOSIS — Z00.00 MEDICARE ANNUAL WELLNESS VISIT, SUBSEQUENT: Primary | ICD-10-CM

## 2019-12-12 LAB
INR BLD: 1.5
PT POC: 18.3 SECONDS
VALID INTERNAL CONTROL?: YES

## 2019-12-12 RX ORDER — WARFARIN 4 MG/1
TABLET ORAL
Qty: 30 TAB | Refills: 5
Start: 2019-12-12 | End: 2020-01-24

## 2019-12-12 NOTE — PROGRESS NOTES
Ramone John  Identified pt with two pt identifiers(name and ). Chief Complaint Patient presents with  
[facility] Follow Up Reviewed record In preparation for visit and have obtained necessary documentation. Advanced directive / living will on file. 1. Have you been to the ER, urgent care clinic or hospitalized since your last visit? ED [facility] 19 
 
2. Have you seen or consulted any other health care providers outside of the [address] [place] [address] since your last visit? Include any pap smears or colon screening. Wound center Vitals reviewed with provider. Health Maintenance reviewed:  
 
Health Maintenance Due Topic  
 EYE EXAM RETINAL OR DILATED  MEDICARE YEARLY EXAM   
 COLONOSCOPY Abuse Screening Questionnaire 8/15/2019 Do you ever feel afraid of your partner? Michael Sparks Temp Readings from Last 3 Encounters:  
19 98 °F (36.7 °C) (Oral) 19 97.7 °F (36.5 °C) 19 98.8 °F (37.1 °C) meenakshi BP Readings from Last 3 Encounters:  
19 153/72  
19 148/60  
19 150/69  
dic Pulse Readings from Last 3 Encounters:  
19 80  
19 67  
19 66  
ill Vitals:  
 19 1410 19 1418 BP: 170/76 153/72 Pulse: 80 Resp: 12 Temp: 98 °F (36.7 °C) TempSrc: Oral   
SpO2: 95% Weight: 263 lb (119.3 kg) Height: 5' 9\" (1.753 m) PainSc:   5 PainLoc: Leg   
light of stairs No Help Needed Learning Assessment 3/25/2014 PRIMARY LEARNER Patient HIGHEST LEVEL OF EDUCATION - PRIMARY LEARNER  2 YEARS OF COLLEGE  
BARRIERS PRIMARY LEARNER NONE  
CO-LEARNER CAREGIVER No  
PRIMARY LANGUAGE ENGLISH  NEED No  
LEARNER PREFERENCE PRIMARY DEMONSTRATION  
LEARNING SPECIAL TOPICS Does does a pacemaker any noise ANSWERED BY patient RELATIONSHIP SELF  
3 most recent PHQ Screens 2019 Little interest or pleasure in doing things Not at all Feeling down, depressed, irritable, or hopeless Not at all Total Score PHQ 2 0

## 2019-12-12 NOTE — PATIENT INSTRUCTIONS
The best way to stay healthy is to live a healthy lifestyle. A healthy lifestyle includes regular exercise, eating a well-balanced diet, keeping a healthy weight and not smoking. Regular physical exams and screening tests are another important way to take care of yourself. Preventive exams provided by health care providers can find health problems early when treatment works best and can keep you from getting certain diseases or illnesses. Preventive services include exams, lab tests, screenings, shots, monitoring and information to help you take care of your own health. All people over 65 should have a pneumonia shot. Pneumonia shots are usually only needed once in a lifetime unless your doctor decides differently. In addition to your physical exam, some screening tests are recommended: 
 
All people over 65 should have a yearly flu shot. People over 65 are at medium to high risk for Hepatitis B. Three shots are needed for complete protection. Bone mass measurement (dexa scan) is recommended every two years. Diabetes Mellitus screening is recommended every year. Glaucoma is an eye disease caused by high pressure in the eye. An eye exam is recommended every year. Cardiovascular screening tests that check your cholesterol and other blood fat (lipid) levels are recommended every five years. Colorectal Cancer screening tests help to find pre-cancerous polyps (growths in the colon) so they can be removed before they turn into cancer. Tests ordered for screening depend on your personal and family history risk factors. Prostate Cancer Screening (annually up to age 76) Screening for breast cancer is recommended yearly with a Mammogram. 
 
Screening for cervical and vaginal cancer is recommended with a pelvic and Pap test every two years.  However if you have had an abnormal pap in the past  three years or at high risk for cervical or vaginal cancer Medicare will cover a pap test and a pelvic exam every year. Here is a list of your current Health Maintenance items with a due date: 
Health Maintenance Due Topic Date Due [facility] Eye Exam  01/30/2019 [facility] Annual Well Visit  12/09/2019  Colonoscopy  04/21/2020

## 2019-12-12 NOTE — PROGRESS NOTES
This is the Subsequent Medicare Annual Wellness Exam, performed 12 months or more after the Initial AWV or the last Subsequent AWV I have reviewed the patient's medical history in detail and updated the computerized patient record. History Patient Active Problem List  
Diagnosis Code  History of breast cancer Z85.3  Asthma J45.909  Fe deficiency anemia D50.9  Status post ablation of atrial fibrillation Z98.890, Z86.79  
 Sick sinus syndrome ([facility]) I49.5  S/P cardiac pacemaker procedure Z95.0  Long term (current) use of anticoagulants Z79.01  
 Mixed hyperlipidemia E78.2  CKD (chronic kidney disease), stage IV ([facility]) N18.4  Systolic murmur S49.9  Essential hypertension I10  
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  
 Diabetic ulcer of right midfoot associated with type 2 diabetes mellitus, with fat layer exposed ([facility] E11.621, L76.714  Cellulitis of right lower extremity L03.115  Foot deformity, acquired, left M21.962  Foot deformity, right M21.961  Pes cavus Q66.70  Type 2 diabetes mellitus with left diabetic foot infection ([facility]) E11.628, L08.9 Past Medical History:  
Diagnosis Date  Acquired lymphedema Right arm  Arrhythmia  Asthma  Atrial fibrillation ([facility] [address] [address] Dr. Chico Gunderson and Dr. Danielle alves [facility])  Cancer ([facility] [address] bilateral breast  
 Chronic kidney disease  Diabetes mellitus type II   
 Dizziness  Essential hypertension  Hyperlipidemia  Hypertension  Long term (current) use of anticoagulants  Morbid obesity ([facility] [address] 3/14/2012  Osteoarthritis  Pacemaker  Sick sinus syndrome ([facility] Past Surgical History:  
Procedure Laterality Date  ABDOMEN SURGERY PROC UNLISTED    
 gastric bypass  GASTRIC BYPASS,OBESE<150CM SAW-EN-Y  7/08  
 [facility]cher  HX AFIB ABLATION    
 HX CARPAL TUNNEL RELEASE [address] [id number] [facility] [address] [place] [address] RIGHT MODIFIED RADICAL   
 HX MOHS PROCEDURES  1995  
 right  HX ORTHOPAEDIC    
 ight knee surgery  HX PACEMAKER    
 IR INSERT TUNL CVC W PORT OVER 5 YEARS    
 LAMINECTOMY,CERVICAL  1994  
 NEEDLE BIOPSY LIVER,W OTHR [address]  8.21.07  
 MS [address] AND 1994  MS TRABECULOPLASTY BY LASER SURGERY    
 US GUIDE ASP OVARIAN CYST ABD APPROACH  8.21.07  
 RIGHT Current Outpatient Medications Medication Sig Dispense Refill  warfarin (COUMADIN) 4 mg tablet Take 1 tablet on Mon and Thurs and a half tablet the other 5 days. 30 Tab 5  potassium chloride SR (KLOR-CON 10) 10 mEq tablet Take 10 mEq by mouth daily. Indications: Patient states she intends to take this medication untl she discusses with Dr. Shmuel Teran on 12-12-19.  levoFLOXacin (LEVAQUIN) 750 mg tablet Take 1 Tab by mouth every fourty-eight (48) hours for 5 doses. 5 Tab 0  
 HYDROcodone-acetaminophen (NORCO) 5-325 mg per tablet Take 1 Tab by mouth every six (6) hours as needed for Pain for up to 5 days. Max Daily Amount: 4 Tabs.  20 Tab 0  
  insulin glargine (LANTUS U-100 INSULIN) 100 unit/mL injection Inject 20 units daily 10 mL 5  
 hydrALAZINE (APRESOLINE) 100 mg tablet TAKE ONE TABLET BY MOUTH THREE TIMES A DAY 90 Tab 11  
 sertraline (ZOLOFT) 50 mg tablet TAKE ONE AND ONE-HALF (1 & 1/2) TABLET BY MOUTH DAILY 45 Tab 5  
 bumetanide (BUMEX) 1 mg tablet Take 2 mg by mouth daily.  cloNIDine HCl (CATAPRES) 0.3 mg tablet Take 0.6 mg by mouth nightly.  metoprolol succinate (TOPROL-XL) 100 mg tablet TAKE TWO TABLETS BY MOUTH DAILY 60 Tab 9  
 doxazosin (CARDURA) 4 mg tablet TAKE ONE TABLET BY MOUTH ONCE NIGHTLY 30 Tab 10  
 busPIRone (BUSPAR) 10 mg tablet TAKE ONE TABLET BY MOUTH TWICE A DAY 60 Tab 10  
 metolazone (ZAROXOLYN) 5 mg tablet Take 1 Tab by mouth daily as needed (take if develop increased LE edema, weight gain > 5 pounds. ). 30 Tab 1  cholecalciferol, VITAMIN D3, (VITAMIN D3) 5,000 unit tab tablet Take 5,000 Units by mouth daily.  vitamin A 8,000 unit capsule Take 8,000 Units by mouth daily.  insulin lispro (HUMALOG) 100 unit/mL kwikpen 2 units with dinner for sugars over 200 1 Package 0  
 cyanocobalamin (VITAMIN B-12) 1,000 mcg sublingual tablet Take 1,000 mcg by mouth daily. Allergies Allergen Reactions  Ace Inhibitors Cough  Amlodipine Swelling  Avapro [Irbesartan] Unable to Obtain  Bactrim [Sulfamethoxazole-Trimethoprim] Other (comments) Sore mouth  Captopril Cough  Carvedilol Diarrhea  Contrast Agent [Iodine] Itching [address]  Morphine Nausea and Vomiting and Swelling  Penicillins Hives  Pravachol [Pravastatin] Nausea Only  Seafood [Shellfish Containing Products] Hives  Singulair [Montelukast] Other (comments)  
  palpitations Family History Problem Relation Age of Onset  Cancer Mother  Heart Disease Mother  Alcohol abuse Father  Diabetes Brother  Hypertension Brother Social History Tobacco Use  
  Smoking status: Never Smoker  Smokeless tobacco: Never Used Substance Use Topics  Alcohol use: Yes Comment: rare Depression Risk Factor Screening:  
 
3 most recent PHQ Screens 12/11/2019 Little interest or pleasure in doing things Not at all Feeling down, depressed, irritable, or hopeless Not at all Total Score PHQ 2 0 Alcohol Risk Factor Screening: Do you average 1 drink per night or more than 7 drinks a week:  No 
 
On any one occasion in the past three months have you have had more than 3 drinks containing alcohol:  No 
 
 
Functional Ability and Level of Safety:  
Hearing: a little hearing loss Activities of Daily Living: The home contains: no steps Patient does total self care Ambulation: with mild difficulty Fall Risk: 
Fall Risk Assessment, last 12 mths 8/15/2019 Able to walk? Yes Fall in past 12 months? No  
 
 
Abuse Screen: 
Patient is not abused Cognitive Screening Has your family/caregiver stated any concerns about your memory: no 
Cognitive Screening: Normal - Verbal Fluency Test 
 
Patient Care Team  
Patient Care Team: 
Nidia Voss MD as PCP - General (Internal Medicine) Nidia Voss MD as PCP - [facility] Lashaun Aguilera MD (Cardiology) Rocío Ross MD (Nephrology) Lindy Larson MD (Cardiology) Zeeshan Jules MD (Hematology and Oncology) Bertha Longoria MD (Ophthalmology) Adry Lane RN as Nurse Navigator Ifeoma South MD as Surgeon (General Surgery) Scott Mcdonald RN as Care Transitions Nurse Scott Mcdonald RN as Care Transitions Nurse Assessment/Plan Education and counseling provided: 
End-of-Life planning (with patient's consent) Screening for glaucoma Health Maintenance Due Topic Date Due  
 EYE EXAM RETINAL OR DILATED  01/30/2019  MEDICARE YEARLY EXAM  12/09/2019  COLONOSCOPY  04/21/2020

## 2019-12-12 NOTE — ACP (ADVANCE CARE PLANNING)
With patient's permission advance care planning discussed. Health Care Agent would be . First Alternate Health Care Agent would be cousin Nargis Sifuentes. She wants no life prolonging treatment if death is imminent. She wants no life prolonging treatment if there is overwhelming, permanent neurologic injury. Reviewed paperwork. Conversation took 4 minutes.

## 2019-12-13 ENCOUNTER — TELEPHONE (OUTPATIENT)
Dept: WOUND CARE | Age: 59
End: 2019-12-13

## 2019-12-13 ENCOUNTER — HOSPITAL ENCOUNTER (OUTPATIENT)
Dept: WOUND CARE | Age: 59
Discharge: HOME | End: 2019-12-13
Payer: COMMERCIAL

## 2019-12-13 VITALS
TEMPERATURE: 97.6 F | DIASTOLIC BLOOD PRESSURE: 72 MMHG | RESPIRATION RATE: 16 BRPM | HEART RATE: 69 BPM | SYSTOLIC BLOOD PRESSURE: 156 MMHG

## 2019-12-13 PROCEDURE — 11043 DBRDMT MUSC&/FSCA 1ST 20/<: CPT

## 2019-12-13 NOTE — WOUND CARE
12/13/19 4917 Wound Foot Right;Plantar;Lateral 07/30/19 Date First Assessed/Time First Assessed: 07/30/19 1034   Present on Hospital Admission: Yes  Wound Approximate Age at First Assessment (Weeks): 1 weeks  Primary Wound Type: Diabetic Ulcer  Location: Foot  Wound Location Orientation: Right;Plantar;Late. .. Dressing Status Removed Dressing Type Gauze;Gauze wrap (arnie) Wound Length (cm) 1.5 cm Wound Width (cm) 0.9 cm Wound Depth (cm) 0.3 cm Wound Surface Area (cm^2) 1.35 cm^2 Wound Volume (cm^3) 0.4 cm^3 Condition of Base Pink Condition of Edges Calloused Drainage Amount Moderate Drainage Color Serosanguinous Wound Odor None Sandra-wound Assessment Intact Cleansing and Cleansing Agents  Normal saline Wound Foot Left;Plantar Date First Assessed/Time First Assessed: 06/21/19 0857   Present on Hospital Admission: Yes  Primary Wound Type: Diabetic  Location: Foot  Wound Location Orientation: Left;Plantar Dressing Status Removed Dressing Type Gauze;Gauze wrap (arnie) Wound Length (cm) 2.5 cm Wound Width (cm) 2.2 cm Wound Depth (cm) 0.4 cm Wound Surface Area (cm^2) 5.5 cm^2 Wound Volume (cm^3) 2.2 cm^3 Condition of Base Pink;Slough Condition of Edges Calloused Undermining (cm) 0.6 cm Direction of Undermining 3 o'clock;12 o'clock Drainage Amount Moderate Drainage Color Serosanguinous Cleansing and Cleansing Agents  Normal saline

## 2019-12-13 NOTE — WOUND CARE
12/13/19 4943 Wound Foot Right;Plantar;Lateral 07/30/19 Date First Assessed/Time First Assessed: 07/30/19 1034   Present on Hospital Admission: Yes  Wound Approximate Age at First Assessment (Weeks): 1 weeks  Primary Wound Type: Diabetic Ulcer  Location: Foot  Wound Location Orientation: Right;Plantar;Late. .. Dressing Status Removed Dressing Type Gauze;Gauze wrap (arnie) Wound Length (cm) 1.5 cm Wound Width (cm) 0.9 cm Wound Depth (cm) 0.3 cm Wound Surface Area (cm^2) 1.35 cm^2 Wound Volume (cm^3) 0.4 cm^3 Condition of Base Pink Condition of Edges Calloused Drainage Amount Moderate Drainage Color Serosanguinous Wound Odor None Sandra-wound Assessment Intact Cleansing and Cleansing Agents  Normal saline Dressing Changed Changed/New Dressing Type Applied Gauze;Gauze wrap (arnie); Special tape (comment) 
(Colagen with Silver; HFBR; Compression stocking) Wound Procedure Type Debridement- Surgical  
Procedure Time Out 0930 Consent Obtained  Yes Procedure Bleeding Minimal  
Procedure Hemostasis  Pressure Type of Tissue Removed  Devitalized Procedure Instrument  Curette Procedure Pain Scale Numeric 0/10 Debridement Procedure Performed by Dr. Ben Castle Post-Procedure Length (cm) 0.7 cm Post-Procedure Width (cm) 2 cm Post-Procedure Depth (cm) 0.3 cm Post-Procedure Volume (cm^3) 0.42 cm^3 Post-Procedure Surface Area (cm^2) 1.4 cm^2 Procedure Tolerated Well Wound Foot Left;Plantar Date First Assessed/Time First Assessed: 06/21/19 0857   Present on Hospital Admission: Yes  Primary Wound Type: Diabetic  Location: Foot  Wound Location Orientation: Left;Plantar Dressing Status Removed Dressing Type Gauze;Gauze wrap (arnie) Wound Length (cm) 2.5 cm Wound Width (cm) 2.2 cm Wound Depth (cm) 0.4 cm Wound Surface Area (cm^2) 5.5 cm^2 Wound Volume (cm^3) 2.2 cm^3 Condition of Base Pink;Slough Condition of Edges Calloused Undermining (cm) 0.6 cm  
 Direction of Undermining 3 o'clock;12 o'clock Drainage Amount Moderate Drainage Color Serosanguinous Cleansing and Cleansing Agents  Normal saline Dressing Changed Changed/New Dressing Type Applied Gauze wrap (arnie); Special tape (comment) ( Collagen with Silver; HFBR; Compression stocking) Wound Procedure Type Debridement- Surgical  
Procedure Time Out 0930 Consent Obtained  Yes Procedure Bleeding Minimal  
Procedure Hemostasis  Pressure Type of Tissue Removed  Devitalized Procedure Instrument  Blade;Curette Procedure Pain Scale Numeric 0/10 Debridement Procedure Performed by Dr Ben Castle Post-Procedure Length (cm) 5.2 cm Post-Procedure Width (cm) 3.5 cm Post-Procedure Depth (cm) 0.6 cm Post-Procedure Volume (cm^3) 10.92 cm^3 Post-Procedure Surface Area (cm^2) 18.2 cm^2 Post Procedure Pain Scale Numeric 0/10 Procedure Tolerated Well Discharge Condition: Stable Pain: 2 Ambulatory Status: Walking Discharge Destination: Home Transportation: Car Accompanied by: Self Discharge instructions reviewed with Patient and copy or written instructions have been provided. All questions/concerns have been addressed at this time.

## 2019-12-13 NOTE — TELEPHONE ENCOUNTER
Order for CT of Right thigh, with and without contrast, to be performed \"ASAP-within 3 days\" [facility][phone][facility] per Dr.Andrew Samaniego's order.

## 2019-12-13 NOTE — DISCHARGE INSTRUCTIONS
Discharge Instructions for  [place] [id number] [facility], [address] [facility]  Telephone: [phone]    NAME:  Jairo Vivas OF BIRTH:  1960  MEDICAL RECORD NUMBER:  [id number]  DATE:  12/13/2019    Wound Cleansing:   Do not scrub or use excessive force. Cleanse wound prior to applying a clean dressing with:  [x] Normal Saline [] Keep Wound Dry in Shower    [] Wound Cleanser   [] Cleanse wound with Mild Soap & Water  [] May Shower at Discharge   [] Other:      Topical Treatments:  Do not apply lotions, creams, or ointments to wound bed unless directed. [] Apply moisturizing lotion to skin surrounding the wound prior to dressing change.  [] Apply antifungal ointment to skin surrounding the wound prior to dressing change.  [] Apply thin film of moisture barrier ointment to skin immediately around wound. [] Other:       Dressings:           Wound Location Bilateral feet plantar  [x] Apply Primary Dressing:       [] MediHoney Gel [] Alginate with Silver [] Alginate   [] Collagen [x] Collagen with Silver   [] Santyl with Moisten saline gauze     [] Hydrocolloid   [] MediHoney Alginate [] Foam with Silver   [] Foam   [x] Hydrofera Blue  Ready  [] Mepilex Border    [] Moisten with Saline [] Hydrogel [] Mepitel  [] Betadine moist gauze   [] Bactroban/Mupirocin [] Polysporin  [] Other:    [] Pack wound loosely with  [] Iodoform   [] Plain Packing  [] Other   [x] Cover and Secure with:     [x] Gauze [x] Tejinder [] Kerlix   [] Ace Wrap [] Cover Roll Tape [] ABD [] Exudry    [] Other:    Avoid contact of tape with skin.   [x] Change dressing: [] Daily    [x] Every Other Day [] Three times per week   [] Once a week [] Do Not Change Dressing   [] Other:     Edema Control:  Apply: [x] Compression Stocking [x]Right Leg [x]Left Leg   [] Tubigrip []Right Leg Double Layer []Left Leg Double Layer       []Right Leg Single Layer []Left Leg Single Layer   [] SpandaGrip []Right Leg  []Left Leg      []Low compression 5-10 mm/Hg      []Medium compression 10-20 mm/Hg     []High compression  20-30 mm/Hg   every morning immediately when getting up should be applied to affected leg(s) from mid foot to knee making sure to cover the heel. Remove every night before going to bed. [x] Elevate leg(s) above the level of the heart when sitting. [x] Avoid prolonged standing in one place. [] Elevate arm/hand above the level of the heart []RightArm []LeftArm    Off-Loading:   [x] Off-loading when [] walking  [] in bed [] sitting  [] Total non-weight bearing  [] Right Leg  [] Left Leg   [] Assistive Device [] Walker [] Cane  [] Wheelchair  [] Crutches   [] Surgical shoe    [] Podus Boot(s)   [] Foam Boot(s)  [] Roll About    [] Cast Boot [] CROW Boot  [x] Other: [employer] shoes     Dietary:  [x] Diet as tolerated: [x] Diabetic Diet: Low carb no sugar [] No Added Salt:  [x] Increase Protein: [] Other:   Activity:  [x] Activity as tolerated:  [] Patient has no activity restrictions     [] Strict Bedrest: [] Remain off Work:     [] May return to full duty work:                                   [] Return to work with restrictions:             Return Appointment:  [] Wound and dressing supply provider:   [] ECF or Home Healthcare:  [] Wound Assessment: [] Physician or NP scheduled for Wound Assessment:   [x] Return Appointment: With Dr. Tyler Roa  in [facility])  [] Ordered tests:      Electronically signed Beth Serrano RN on 12/13/2019 at 9:50 AM     Marisela Cruz 281: Should you experience any significant changes in your wound(s) or have questions about your wound care, please contact the [facility] Main at [address] [place] [address] 8:00 am - 4:30. If you need help with your wound outside these hours and cannot wait until we are again available, contact your PCP or go to the hospital emergency room.      PLEASE NOTE: IF YOU ARE UNABLE TO OBTAIN WOUND SUPPLIES, CONTINUE TO USE THE SUPPLIES YOU HAVE AVAILABLE UNTIL YOU ARE ABLE TO REACH US. IT IS MOST IMPORTANT TO KEEP THE WOUND COVERED AT ALL TIMES.      Physician Signature:_______________________    Date: ___________ Time:  ____________

## 2019-12-13 NOTE — PROGRESS NOTES
[address] 
WOUND CARE PROGRESS NOTE Name:  Latasha Friend 
MR#:  [id number] :  1960 ACCOUNT #:  [de-identified] DATE OF SERVICE:  2019 SUBJECTIVE:  The patient returns for treatment of bilateral plantar foot diabetic foot ulcers E11.621 with new-onset cellulitis L03.115. She was last seen in this office 1 week ago. She had cellulitis with erythema, pain, tenderness and fullness of her right thigh. She was admitted to [facility] where she received IV cefepime and vancomycin. She was discharged to home on Levaquin 750 mg per day which she continues to take and they changed her Coumadin dose to 2 mg per day. Since being discharged, she has felt better than she did prior to her hospitalization. Her right thigh continues to be tender and this is the area that has gotten recurrently infected over many months, if not years. She denies having any preceding trauma to that area. She had an ultrasound which showed no evidence of deep vein thrombosis. OBJECTIVE: 
VITAL SIGNS:  Her temperature is 97.6, pulse 69, respirations 16, blood pressure 156/72. EXTREMITIES:  Examination of her right thigh shows that it remains exquisitely tender and full along the medial thigh. The erythema over this area has improved. WOUND EXAMINATION:  The plantar wound on the right lateral foot measures 1.5 x 0.9 x 0.3 cm. It is surrounded by callus and a firm epibole. The surface of the wound is covered with slough. It was recommended that this wound be debrided. I explained the risks and benefits. The patient understood, wished to proceed. Therefore, topical Xylocaine 4% gel was applied for 5 minutes. Using a 5-mm ring curette, the callus was pared down. The surface of the wound was debrided of all devitalized tissue. Hemostasis was achieved with gentle pressure until dry. Post debridement dimensions were 0.7 x 2 x 0.3 cm. The wound to the left plantar foot measured 2.5 x 2.2 x 0.4 cm. It was surrounded by macerated callus and the surface of the wound was covered with a layer of slough. It was recommended that this wound be debrided. Again the patient understood the risks and benefits and wished to proceed with debridement; topical Xylocaine 4% gel was applied for 5 minutes. Using a 5-mm ring curette, the periwound callus was pared down and was removed with the curette and with a 15-blade. The surface of the wound was then debrided of all devitalized tissue down to healthy bleeding muscle. Post-debridement dimensions were 5.2 x 3.5 x 0.6 cm. ASSESSMENT AND PLAN:  The patient is due to get new diabetic shoes today. We will continue using Endoform and Hydrofera Blue to both wounds. I am ordering a CT scan of her right thigh with and without contrast to rule out pathology such as a chronic hematoma or even an abscess which might be the source of her recurrent infections. She is going to return in followup to be seen by Dr. Dottie Du. I will see her on weeks that Dr. Dottie Du is out of the office during the holiday season. All questions were answered. Her condition on discharge is stable. Shad Shrestha MD 
 
 
[id number] 
D:  12/13/2019 10:35 
T:  12/13/2019 11:55 JOB #:  [id number]

## 2019-12-16 NOTE — CDMP QUERY
Pt admitted with Right lower extremity cellulitis. Pt noted to have Type II diabetes. If possible, please document in progress notes and discharge summary the relationship, if any, between cellulitis and DM.  Cellulitis due to Diabetes  Cellulitis unrelated to Diabetes  Other  Clinically unable to determine The medical record reflects the following: 
  Risk Factors: Hx DM2; CKD; HLD: HTN Clinical Indicators: Gluc: 141 ^ 145 ^ 140 ^ 147 . ....... noted per ED: Right lower leg: Edema present; Left lower leg: Edema present. ..noted: There is a subtle erythematous indurated area of the medial and posterior right thigh extending to the posterolateral proximal right calf, tender, with no palpable fluid collections. ... Say Coyle There are ulcerations of the bilateral feet. Left plantar foot wound with mild malodorous drainage Treatment: Given presence of DM with foot ulcers will cover broadly with vancomycin, cefepimeIV Vancomycin and Cefepime; Continue wound care; Continue Lantus per home; SSI AC/HS Thank you, Dhaar Shields CDI

## 2019-12-17 ENCOUNTER — HOSPITAL ENCOUNTER (OUTPATIENT)
Dept: WOUND CARE | Age: 59
Discharge: HOME | End: 2019-12-17
Payer: COMMERCIAL

## 2019-12-17 VITALS
TEMPERATURE: 96.9 F | HEART RATE: 82 BPM | RESPIRATION RATE: 16 BRPM | SYSTOLIC BLOOD PRESSURE: 171 MMHG | DIASTOLIC BLOOD PRESSURE: 77 MMHG

## 2019-12-17 PROCEDURE — 11042 DBRDMT SUBQ TIS 1ST 20SQCM/<: CPT

## 2019-12-17 NOTE — WOUND CARE
12/17/19 1457 Wound Foot Right;Plantar;Lateral 07/30/19 Date First Assessed/Time First Assessed: 07/30/19 1034   Present on Hospital Admission: Yes  Wound Approximate Age at First Assessment (Weeks): 1 weeks  Primary Wound Type: Diabetic Ulcer  Location: Foot  Wound Location Orientation: Right;Plantar;Late. .. Dressing Status Removed Dressing Type  
(Endoform, HFBR, gauze, tape) Wound Length (cm) 0.9 cm Wound Width (cm) 1.1 cm Wound Depth (cm) 0.2 cm Wound Surface Area (cm^2) 0.99 cm^2 Wound Volume (cm^3) 0.2 cm^3 Condition of Base Pink Condition of Edges Calloused; Open Drainage Amount Moderate Drainage Color Serosanguinous Wound Odor None Sandra-wound Assessment Intact Cleansing and Cleansing Agents  Normal saline Dressing Changed Changed/New Dressing Type Applied Gauze;Gauze wrap (arnie); Special tape (comment) 
(Endoform; HFBR after cleansing site with Normal saline) Wound Procedure Type Debridement- Surgical  
Procedure Time Out 2363 Consent Obtained  Yes Procedure Bleeding Moderate Procedure Hemostasis  Silver Nitrate Type of Tissue Removed  Devitalized Procedure Instrument  Blade;Curette Procedure Pain Scale Numeric 0/10 Debridement Procedure Performed by Dr Maral Tello Post-Procedure Length (cm) 0.9 cm Post-Procedure Width (cm) 1.1 cm Post-Procedure Depth (cm) 0.3 cm Post-Procedure Volume (cm^3) 0.3 cm^3 Post-Procedure Surface Area (cm^2) 0.99 cm^2 Procedure Tolerated Well Wound Foot Left;Plantar Date First Assessed/Time First Assessed: 06/21/19 0857   Present on Hospital Admission: Yes  Primary Wound Type: Diabetic  Location: Foot  Wound Location Orientation: Left;Plantar Dressing Status Removed Dressing Type  
(Endoform, HFBR, gauze, RG) Wound Length (cm) 3 cm Wound Width (cm) 2.8 cm Wound Depth (cm) 0.7 cm Wound Surface Area (cm^2) 8.4 cm^2 Wound Volume (cm^3) 5.88 cm^3 Condition of Base Pink;Slough Condition of Edges Calloused; Open Drainage Amount Moderate Drainage Color Serosanguinous Wound Odor None Sandra-wound Assessment Intact Cleansing and Cleansing Agents  Normal saline Dressing Changed Changed/New Dressing Type Applied Gauze;Gauze wrap (arnie); Special tape (comment) (Medi Honey Alginate after cleansing site with Normal Saline) Wound Procedure Type Debridement- Surgical  
Procedure Time Out 9914 Consent Obtained  Yes Procedure Bleeding Moderate Procedure Hemostasis  Silver Nitrate Type of Tissue Removed  Devitalized Procedure Instrument  Blade;Curette Procedure Pain Scale Numeric 0/10 Debridement Procedure Performed by Dr Corinne Polite Post-Procedure Length (cm) 2.8 cm Post-Procedure Width (cm) 2.7 cm Post-Procedure Depth (cm) 0.8 cm Post-Procedure Volume (cm^3) 6.05 cm^3 Post-Procedure Surface Area (cm^2) 7.56 cm^2 Post Procedure Pain Scale Numeric 0/10 Procedure Tolerated Well Discharge Condition: Stable Pain: 0 Ambulatory Status: Minnie [place] Discharge Destination: Home Transportation: Car Accompanied by: Self Discharge instructions reviewed with Patient and copy or written instructions have been provided. All questions/concerns have been addressed at this time.

## 2019-12-17 NOTE — DISCHARGE INSTRUCTIONS
Discharge Instructions for  [place] [id number] [facility], [address] [facility]  Telephone: [phone]    NAME:  Becky Barrier OF BIRTH:  1960  MEDICAL RECORD NUMBER:  [id number]  DATE:  12/17/2019    Wound Cleansing:   Do not scrub or use excessive force. Cleanse wound prior to applying a clean dressing with:  [x] Normal Saline [] Keep Wound Dry in Shower    [] Wound Cleanser   [] Cleanse wound with Mild Soap & Water  [] May Shower at Discharge   [] Other:      Topical Treatments:  Do not apply lotions, creams, or ointments to wound bed unless directed. [] Apply moisturizing lotion to skin surrounding the wound prior to dressing change.  [] Apply antifungal ointment to skin surrounding the wound prior to dressing change.  [] Apply thin film of moisture barrier ointment to skin immediately around wound. [] Other:       Dressings:           Wound Location Right heel   [x] Apply Primary Dressing:       [] MediHoney Gel [] Alginate with Silver [] Alginate   [] Collagen [x] Collagen with Silver Endoform  [] Santyl with Moisten saline gauze     [] Hydrocolloid   [] MediHoney Alginate [] Foam with Silver   [] Foam   [x] Hydrofera Blue Ready   [] Mepilex Border    [] Moisten with Saline [] Hydrogel [] Mepitel  [] Betadine moist gauze   [] Bactroban/Mupirocin [] Polysporin  [] Other:    [] Pack wound loosely with  [] Iodoform   [] Plain Packing  [] Other   [x] Cover and Secure with:     [x] Gauze [x] Tejinder [] Kerlix   [] Ace Wrap [] Cover Roll Tape [] ABD [] Exudry    [] Other:    Avoid contact of tape with skin.   [x] Change dressing: [] Daily    [x] Every Other Day [] Three times per week   [] Once a week [] Do Not Change Dressing   [] Other:     Dressings:           Wound Location Left heel & proximal heel  [x] Apply Primary Dressing:       [] MediHoney Gel [] Alginate with Silver [] Alginate   [] Collagen [] Collagen with Silver   [] Santyl with Moisten saline gauze     [] Hydrocolloid   [x] MediHoney Alginate [] Foam with Silver   [] Foam   [] Hydrofera Blue    [] Mepilex Border    [] Moisten with Saline [] Hydrogel [] Mepitel  [] Betadine moist gauze   [] Bactroban/Mupirocin [] Polysporin  [] Other:    [] Pack wound loosely with  [] Iodoform   [] Plain Packing  [] Other   [x] Cover and Secure with:     [x] Gauze [x] Tejinder [] Kerlix   [] Ace Wrap [] Cover Roll Tape [] ABD [] Exudry    [] Other:    Avoid contact of tape with skin. [x] Change dressing: [] Daily    [x] Every Other Day [] Three times per week   [] Once a week [] Do Not Change Dressing   [] Other:    Edema Control:  Apply: [x] Compression Stocking [x]Right Leg [x]Left Leg   every morning immediately when getting up should be applied to affected leg(s) from mid foot to knee making sure to cover the heel. Remove every night before going to bed. [x] Elevate leg(s) above the level of the heart when sitting. [x] Avoid prolonged standing in one place.    [] Elevate arm/hand above the level of the heart []RightArm []LeftArm    Off-Loading:   [] Off-loading when [] walking  [] in bed [] sitting  [] Total non-weight bearing  [] Right Leg  [] Left Leg   [x] Assistive Device [] Walker [] Cane  [] Wheelchair  [] Crutches   [] Surgical shoe    [] Podus Boot(s)   [] Foam Boot(s)  [] Roll About    [] Cast Boot [] CROW Boot  [x] Other: Diabetic shoes    Dietary:  [x] Diet as tolerated: [x] Diabetic Diet: Low carb no sugar [] No Added Salt:  [x] Increase Protein: [] Other:   Activity:  [x] Activity as tolerated:  [] Patient has no activity restrictions     [] Strict Bedrest: [] Remain off Work:     [] May return to full duty work:                                   [] Return to work with restrictions:             Return Appointment:  [] Wound and dressing supply provider:   [] ECF or Home Healthcare:  [] Wound Assessment: [] Physician or NP scheduled for Wound Assessment:   [x] Return Appointment: With  Ulysses Puentes on 12/27/19 and Dr. Mary Hinkle on 12/31/19    [] Ordered tests:      Electronically signed Shraddha Ramey RN on 12/17/2019 at 3:32 PM     [address] [facility] Information: Should you experience any significant changes in your wound(s) or have questions about your wound care, please contact the [facility] at [address] [place] [address] 8:00 am - 4:30. If you need help with your wound outside these hours and cannot wait until we are again available, contact your PCP or go to the hospital emergency room. PLEASE NOTE: IF YOU ARE UNABLE TO OBTAIN WOUND SUPPLIES, CONTINUE TO USE THE SUPPLIES YOU HAVE AVAILABLE UNTIL YOU ARE ABLE TO REACH US. IT IS MOST IMPORTANT TO KEEP THE WOUND COVERED AT ALL TIMES.      Physician Signature:_______________________    Date: ___________ Time:  ____________

## 2019-12-17 NOTE — WOUND CARE
12/17/19 1457 Wound Foot Right;Plantar;Lateral 07/30/19 Date First Assessed/Time First Assessed: 07/30/19 1034   Present on Hospital Admission: Yes  Wound Approximate Age at First Assessment (Weeks): 1 weeks  Primary Wound Type: Diabetic Ulcer  Location: Foot  Wound Location Orientation: Right;Plantar;Late. .. Dressing Status Removed Dressing Type  
(Endoform, HFBR, gauze, tape) Wound Length (cm) 0.9 cm Wound Width (cm) 1.1 cm Wound Depth (cm) 0.2 cm Wound Surface Area (cm^2) 0.99 cm^2 Wound Volume (cm^3) 0.2 cm^3 Condition of Base Pink Condition of Edges Calloused; Open Drainage Amount Moderate Drainage Color Serosanguinous Wound Odor None Sandra-wound Assessment Intact Cleansing and Cleansing Agents  Normal saline Wound Foot Left;Plantar Date First Assessed/Time First Assessed: 06/21/19 0857   Present on Hospital Admission: Yes  Primary Wound Type: Diabetic  Location: Foot  Wound Location Orientation: Left;Plantar Dressing Status Removed Dressing Type  
(Endoform, HFBR, gauze, RG) Wound Length (cm) 3 cm Wound Width (cm) 2.8 cm Wound Depth (cm) 0.7 cm Wound Surface Area (cm^2) 8.4 cm^2 Wound Volume (cm^3) 5.88 cm^3 Condition of Base Pink;Slough Condition of Edges Calloused; Open Drainage Amount Moderate Drainage Color Serosanguinous Wound Odor None Sandra-wound Assessment Intact Cleansing and Cleansing Agents  Normal saline Visit Vitals /77 (BP 1 Location: Left arm) Pulse 82 Temp 96.9 °F (36.1 °C) Resp 16

## 2019-12-17 NOTE — PROGRESS NOTES
Patient presents to wound clinic for: Zeke Talamantes is a 60 y.o. female who presents for wound care of bilateral plantar foot ulcers. Patient currently being treated by Dr. Mikel Chen for a wound of the left breast, and was referred to me for further offloading and wound care recommendations of the bilateral heel ulcers. The ulcer of the plantar left heel occurred first and patient was placed in a heel offloading shoe. She then subsequently developed an ulceration of the plantar 5th met base of the right foot. She has been wearing her sneakers with her offloading pads. 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 Denies any issues with abx. Denies f/c/n/v/d. Denies any increased pain to foot. Received her new diabetic shoes on Friday, and has not been having any issues. States that she has a  tomorrow and that she does not want to go in a TCC today. Pertinent Medical History: 
Past Medical History:  
Diagnosis Date  Acquired lymphedema Right arm  Arrhythmia  Asthma  Atrial fibrillation ([facility] Dr. Viv Zaragoza and Dr. Adriano Valverde [facility])  Cancer ([facility] bilateral breast  
 Chronic kidney disease  Diabetes mellitus type II   
 Dizziness  Essential hypertension  Hyperlipidemia  Hypertension  Long term (current) use of anticoagulants  Morbid obesity ([facility] 3/14/2012  Osteoarthritis  Pacemaker  Sick sinus syndrome ([facility] Past Surgical History:  
Procedure Laterality Date  ABDOMEN SURGERY PROC UNLISTED    
 gastric bypass  GASTRIC BYPASS,OBESE<150CM SAW-EN-Y    
 guanakito  HX AFIB ABLATION    
 HX CARPAL TUNNEL RELEASE [address] [facility] [address] RIGHT MODIFIED RADICAL   
 HX MOHS PROCEDURES    
 right  HX ORTHOPAEDIC    
 ight knee surgery  HX PACEMAKER    
 IR INSERT TUNL CVC W PORT OVER 5 YEARS    
  [address]  8.21.07  
 IA [address] AND 1994  IA TRABECULOPLASTY BY LASER SURGERY    
 US GUIDE ASP OVARIAN CYST ABD APPROACH  8.21.07  
 RIGHT Prior to Admission medications Medication Sig Start Date End Date Taking? Authorizing Provider  
warfarin (COUMADIN) 4 mg tablet Take 1 tablet on Mon and Thurs and a half tablet the other 5 days. 12/12/19  Yes Husam Figueroa MD  
potassium chloride SR (KLOR-CON 10) 10 mEq tablet Take 10 mEq by mouth daily. Indications: Patient states she intends to take this medication untl she discusses with Dr. Rosalva Glez on 12-12-19. Yes Provider, Historical  
levoFLOXacin (LEVAQUIN) 750 mg tablet Take 1 Tab by mouth every fourty-eight (48) hours for 5 doses. 12/9/19 12/18/19 Yes Osman Menjivar MD  
insulin glargine (LANTUS U-100 INSULIN) 100 unit/mL injection Inject 20 units daily 11/10/19  Yes Husam Figueroa MD  
hydrALAZINE (APRESOLINE) 100 mg tablet TAKE ONE TABLET BY MOUTH THREE TIMES A DAY 10/21/19  Yes Husam Figueroa MD  
sertraline (ZOLOFT) 50 mg tablet TAKE ONE AND ONE-HALF (1 & 1/2) TABLET BY MOUTH DAILY 8/22/19  Yes Husam Figueroa MD  
bumetanide (BUMEX) 1 mg tablet Take 2 mg by mouth daily. Yes Provider, Historical  
cloNIDine HCl (CATAPRES) 0.3 mg tablet Take 0.6 mg by mouth nightly. Yes Provider, Historical  
metoprolol succinate (TOPROL-XL) 100 mg tablet TAKE TWO TABLETS BY MOUTH DAILY 7/12/19  Yes Husam Figueroa MD  
doxazosin (CARDURA) 4 mg tablet TAKE ONE TABLET BY MOUTH ONCE NIGHTLY 6/14/19  Yes Husam Figueroa MD  
busPIRone (BUSPAR) 10 mg tablet TAKE ONE TABLET BY MOUTH TWICE A DAY 5/24/19  Yes Husam Figueroa MD  
metolazone (ZAROXOLYN) 5 mg tablet Take 1 Tab by mouth daily as needed (take if develop increased LE edema, weight gain > 5 pounds. ). 4/10/14  Yes Candice Lorenzo NP  
 cholecalciferol, VITAMIN D3, (VITAMIN D3) 5,000 unit tab tablet Take 5,000 Units by mouth daily. Yes Provider, Historical  
vitamin A 8,000 unit capsule Take 8,000 Units by mouth daily. Yes Provider, Historical  
insulin lispro (HUMALOG) 100 unit/mL kwikpen 2 units with dinner for sugars over 200 1/3/14  Yes Marly Santos MD  
cyanocobalamin (VITAMIN B-12) 1,000 mcg sublingual tablet Take 1,000 mcg by mouth daily. Yes Provider, Historical  
 
Allergies Allergen Reactions  Ace Inhibitors Cough  Amlodipine Swelling  Avapro [Irbesartan] Unable to Obtain  Bactrim [Sulfamethoxazole-Trimethoprim] Other (comments) Sore mouth  Captopril Cough  Carvedilol Diarrhea  Contrast Agent [Iodine] Itching [address]  Morphine Nausea and Vomiting and Swelling  Penicillins Hives  Pravachol [Pravastatin] Nausea Only  Seafood [Shellfish Containing Products] Hives  Singulair [Montelukast] Other (comments)  
  palpitations Family History Problem Relation Age of Onset  Cancer Mother  Heart Disease Mother  Alcohol abuse Father  Diabetes Brother  Hypertension Brother Social History Socioeconomic History  Marital status:  Spouse name: Not on file  Number of children: Not on file  Years of education: Not on file  Highest education level: Not on file Occupational History  Not on file Social Needs  Financial resource strain: Not on file  Food insecurity:  
  Worry: Not on file Inability: Not on file  Transportation needs:  
  Medical: Not on file Non-medical: Not on file Tobacco Use  Smoking status: Never Smoker  Smokeless tobacco: Never Used Substance and Sexual Activity  Alcohol use: Yes Comment: rare  Drug use: No  
 Sexual activity: Not on file Lifestyle  Physical activity:  
  Days per week: Not on file Minutes per session: Not on file  Stress: Not on file Relationships  Social connections:  
  Talks on phone: Not on file Gets together: Not on file Attends Spiritism service: Not on file Active member of club or organization: Not on file Attends meetings of clubs or organizations: Not on file Relationship status: Not on file  Intimate partner violence:  
  Fear of current or ex partner: Not on file Emotionally abused: Not on file Physically abused: Not on file Forced sexual activity: Not on file Other Topics Concern  Not on file Social History Narrative  Not on file Vitals:  
 12/17/19 1451 BP: 171/77 Pulse: 82 Resp: 16 Temp: 96.9 °F (36.1 °C) Review of Systems: 
 
Gen: No fever, chills, malaise, weight loss/gain. Heent: No headache, rhinorrhea, epistaxis, ear pain, hearing loss, sinus pain, neck pain/stiffness, sore throat. Heart: No chest pain, palpitations, CHOW, pnd, or orthopnea. Resp: No cough, hemoptysis, wheezing and shortness of breath. GI: No nausea, vomiting, diarrhea, constipation, melena or hematochezia. : No urinary obstruction, dysuria or hematuria. Derm: see below Musc/skeletal: no bone or joint complains. Vasc: No edema, cyanosis or claudication. Endo: No heat/cold intolerance, no polyuria,polydipsia or polyphagia. Neuro: No unilateral weakness, numbness, tingling. No seizures. Heme: No easy bruising or bleeding. Wound Description: 
Refer to nursing notes for measurements. Ulcer Debridement Left plantar foot wound Character of Ulcer: worsened Indication for Debridement: Slough, Exudate, Abnormal wound edge and Abnormal Wound base Pre debridement measurements: 3 x 2.8 x 0.7 cm Risks of procedure were discussed with patient. Consent has been signed. Anesthetic: Lidocaine 4% topical cream  
 
Level of Debridement: Subctaneous Tissue , muscule Tissue and/or Material removed: Exudate, Fibrin/ Slough and Subcutaneous, Type of Tissue: Non- Viable Pre-debridement severity: Fat Layer Exposed Post debridement severity: Fat Layer exposed Instrument(s) used: Scalpel Bleeding controlled with: Pressure Estimated blood loss: Minimal 
 
Pre debridement measurements: 3.1 x 2.9 x 0.8 cm Post procedural pain: mild Patient tolerated procedure well. Right  plantar foot wound Character of Ulcer: smaller Indication for Debridement: Slough, Exudate, Abnormal wound edge and Abnormal Wound base Pre debridement measurements: 0.9 cm x 1.1 cm x 0.2 cm Risks of procedure were discussed with patient. Consent has been signed. Anesthetic: Lidocaine 4% topical cream  
 
Level of Debridement: Subctaneous Tissue Tissue and/or Material removed: Exudate, Fibrin/ Slough and Subcutaneous Type of Tissue: Non- Viable Pre-debridement severity: Fat Layer Exposed Post debridement severity: Fat Layer exposed Instrument(s) used: Scalpel Bleeding controlled with: Pressure Estimated blood loss: Minimal 
 
Postdebridement measurements: 1.0 cm x 1.2 cm x 0.3 cm Post procedural pain: mild Patient tolerated procedure well. Infection: none Edema: mild edema Lower Extremity Circulation Reviewed patient's recent ABIs. Shows decrease from when the  
 
Offloading: Offloading padding to shoes Assessment: 1. Diabetic plantar heel ulcer left foot with associated infection 2. Diabetic ulcer right foot-plantar 5th met base 3. Diabetic peripheral neuropathy 4. B/l cavus foot deformity Plan:  
-Patient seen and evaluated as noted above. 
-Wound debridement performed. 
-Discussed with patient that the right foot is improving (especially after receiving diabetic shoe), but that the left foot is continuing to become larger. 
-Recommended TCC to LLE. Patient refused because she has to go to a  tomorrow.   We discussed at length how the wound is worsened, and that she needs further offloading to the area. We had previously agreed that once she got the diabetic shoes to adequately offload the right foot that she would go back into a TCC to the left lower extremity. Patient still refusing cast today. Discussed with patient that she must plan to go back into the TCC on 12/31/19 as this wound will not heal unless she adequately offloads it. If patient does not go into TCC at her next visit with me, I told her that I will then make her NWB to the LLE. I discussed that the wound is deepening and with her history of prior infections to the area, she is becoming higher risk for bone infection.   
-Follow-up next week with Dr. Sunni Whalen and with me on 59/72/02 for TCC application.

## 2019-12-17 NOTE — WOUND CARE
12/17/19 1544 Wound Heel Left;Proximal 12/17/19 Date First Assessed/Time First Assessed: 12/17/19 1530   Present on Hospital Admission: Yes  Wound Approximate Age at First Assessment (Weeks): 1 weeks  Primary Wound Type: Diabetic Ulcer  Location: Heel  Wound Location Orientation: Left;Proximal  Evgeny. .. Dressing Status Removed Dressing Type  
(Gauze, tape) Non-staged Wound Description Full thickness Wound Length (cm) 2.2 cm Wound Width (cm) 1 cm Wound Depth (cm) 0.1 cm Wound Surface Area (cm^2) 2.2 cm^2 Wound Volume (cm^3) 0.22 cm^3 Condition of Base Pink Condition of Edges Open Drainage Amount Moderate Drainage Color Serosanguinous Wound Odor None Sandra-wound Assessment Intact Cleansing and Cleansing Agents  Normal saline Wound Procedure Type Debridement- Surgical  
Procedure Time Out 3036 Consent Obtained  Yes Procedure Bleeding Moderate Procedure Hemostasis  Silver Nitrate Type of Tissue Removed  Devitalized Procedure Instrument  Blade;Curette Procedure Pain Scale Numeric 0/10 Debridement Procedure Performed by Dr. Shweta Willis Post-Procedure Length (cm) 2.2 cm Post-Procedure Width (cm) 1 cm Post-Procedure Depth (cm) 0.2 cm Post-Procedure Volume (cm^3) 0.44 cm^3 Post-Procedure Surface Area (cm^2) 2.2 cm^2 Procedure Tolerated Well Visit Vitals /77 (BP 1 Location: Left arm) Pulse 82 Temp 96.9 °F (36.1 °C) Resp 16

## 2019-12-20 ENCOUNTER — HOSPITAL ENCOUNTER (OUTPATIENT)
Dept: CT IMAGING | Age: 59
Discharge: HOME | End: 2019-12-20
Attending: OTOLARYNGOLOGY
Payer: COMMERCIAL

## 2019-12-20 DIAGNOSIS — L03.115 CELLULITIS OF RIGHT LEG: ICD-10-CM

## 2019-12-20 PROCEDURE — 73700 CT LOWER EXTREMITY W/O DYE: CPT

## 2019-12-20 RX ORDER — SODIUM CHLORIDE 0.9 % (FLUSH) 0.9 %
10 SYRINGE (ML) INJECTION
Status: DISCONTINUED | OUTPATIENT
Start: 2019-12-20 | End: 2019-12-21 | Stop reason: HOSPADM

## 2019-12-27 ENCOUNTER — HOSPITAL ENCOUNTER (OUTPATIENT)
Dept: WOUND CARE | Age: 59
Discharge: HOME | End: 2019-12-27
Payer: COMMERCIAL

## 2019-12-27 VITALS
SYSTOLIC BLOOD PRESSURE: 180 MMHG | RESPIRATION RATE: 16 BRPM | HEART RATE: 86 BPM | DIASTOLIC BLOOD PRESSURE: 79 MMHG | TEMPERATURE: 96.5 F

## 2019-12-27 PROCEDURE — 11043 DBRDMT MUSC&/FSCA 1ST 20/<: CPT

## 2019-12-27 NOTE — WOUND CARE
Visit Vitals /79 (BP 1 Location: Left arm, BP Patient Position: Sitting; At rest) Pulse 86 Temp 96.5 °F (35.8 °C) Resp 16 LLE Peripheral Vascular Capillary Refill: Less than/equal to 3 seconds (12/27/19 0843) Color: Appropriate for race (12/27/19 4817) Temperature: Warm (12/27/19 0843) Sensation: Present (12/27/19 2003) Pedal Pulse: Palpable (12/27/19 0843) Circumference of Calf (cm): 44 cm (12/27/19 0843) Location of Measurement (Calf): Mid  (12/27/19 0843) Circumference of Ankle (cm): 23.5 cm (12/27/19 0843) Location of Measurement (Ankle): Upper  (12/27/19 0843) RLE Peripheral Vascular Capillary Refill: Less than/equal to 3 seconds (12/27/19 0843) Color: Appropriate for race (12/27/19 4204) Temperature: Warm (12/27/19 0843) Sensation: Present (12/27/19 3732) Pedal Pulse: Palpable (12/27/19 0843) Circumference of Calf (cm): 43 cm (12/27/19 0843) Location of Measurement (Calf): Mid  (12/27/19 0843) Circumference of Ankle (cm): 23 cm (12/27/19 0843) Location of Measurement (Ankle): Upper  (12/27/19 0843)

## 2019-12-27 NOTE — DISCHARGE INSTRUCTIONS
Discharge Instructions for  [place] [id number] [address], [address] [facility]  Telephone: [phone]    NAME:  King Stapleton OF BIRTH:  1960  MEDICAL RECORD NUMBER:  [id number]  DATE:  12/27/2019    Wound Cleansing:   Do not scrub or use excessive force. Cleanse wound prior to applying a clean dressing with:  [x] Normal Saline [] Keep Wound Dry in Shower    [] Wound Cleanser   [] Cleanse wound with Mild Soap & Water  [] May Shower at Discharge   [] Other:      Topical Treatments:  Do not apply lotions, creams, or ointments to wound bed unless directed. [] Apply moisturizing lotion to skin surrounding the wound prior to dressing change.  [] Apply antifungal ointment to skin surrounding the wound prior to dressing change.  [] Apply thin film of moisture barrier ointment to skin immediately around wound. [] Other:       Dressings:           Wound Location Right foot  [x] Apply Primary Dressing:       [] MediHoney Gel [] Alginate with Silver [] Alginate   [] Collagen [x] Collagen with Silver - Endoform  [] Santyl with Moisten saline gauze     [] Hydrocolloid   [] MediHoney Alginate [] Foam with Silver   [] Foam   [x] Hydrofera Blue Ready [] Mepilex Border    [] Moisten with Saline [] Hydrogel [] Mepitel  [] Betadine moist gauze   [] Bactroban/Mupirocin [] Polysporin  [] Other:    [] Pack wound loosely with  [] Iodoform   [] Plain Packing  [] Other   [x] Cover and Secure with:     [x] Gauze [x] Tejinder [] Kerlix   [] Ace Wrap [] Cover Roll Tape [] ABD [] Exudry    [] Other:    Avoid contact of tape with skin.   [x] Change dressing: [] Daily    [x] Every Other Day [] Three times per week   [] Once a week [] Do Not Change Dressing   [] Other:    Dressings:           Wound Location Left foot  [x] Apply Primary Dressing:       [] MediHoney Gel [] Alginate with Silver [] Alginate   [] Collagen [] Collagen with Silver   [] Santyl with Moisten saline gauze [] Hydrocolloid   [x] MediHoney Alginate [] Foam with Silver   [] Foam   [] Hydrofera Blue    [] Mepilex Border    [] Moisten with Saline [] Hydrogel [] Mepitel  [] Betadine moist gauze   [] Bactroban/Mupirocin [] Polysporin  [] Other:    [] Pack wound loosely with  [] Iodoform   [] Plain Packing  [] Other   [x] Cover and Secure with:     [x] Gauze [x] Tejinder [] Kerlix   [] Ace Wrap [] Cover Roll Tape [] ABD [] Exudry    [] Other:    Avoid contact of tape with skin. [x] Change dressing: [] Daily    [x] Every Other Day [] Three times per week   [] Once a week [] Do Not Change Dressing   [] Other:     Edema Control:  Apply: [x] Compression Stocking [x]Right Leg [x]Left Leg   [] Tubigrip []Right Leg Double Layer []Left Leg Double Layer       []Right Leg Single Layer []Left Leg Single Layer   [] SpandaGrip []Right Leg  []Left Leg      []Low compression 5-10 mm/Hg      []Medium compression 10-20 mm/Hg     []High compression  20-30 mm/Hg   every morning immediately when getting up should be applied to affected leg(s) from mid foot to knee making sure to cover the heel. Remove every night before going to bed. [x] Elevate leg(s) above the level of the heart when sitting. [x] Avoid prolonged standing in one place.    [] Elevate arm/hand above the level of the heart []RightArm []LeftArm    Off-Loading:   [x] Off-loading when [x] walking  [] in bed [] sitting  [] Total non-weight bearing  [] Right Leg  [] Left Leg   [] Assistive Device [] Walker [] Cane  [] Wheelchair  [] Crutches   [] Surgical shoe    [] Podus Boot(s)   [] Foam Boot(s)  [] Roll About    [] Cast Boot [] CROW Boot  [x] Other: Diabetic shoes  Dietary:  [x] Diet as tolerated: [x] Diabetic Diet: Low carb no sugar [] No Added Salt:  [x] Increase Protein: [] Other:   Activity:  [x] Activity as tolerated:  [] Patient has no activity restrictions     [] Strict Bedrest: [] Remain off Work:     [] May return to full duty work: [] Return to work with restrictions:             Return Appointment:  [] Wound and dressing supply provider:   [] ECF or Home Healthcare:  [] Wound Assessment: [] Physician or NP scheduled for Wound Assessment:   [x] Return Appointment: With Dr. Shay Vinson on 12/31/19 Week(s)  [] Ordered tests:      Electronically signed Ayesha Sena RN on 12/27/2019 at 9:19 AM     [address] [place] [address] Information: Should you experience any significant changes in your wound(s) or have questions about your wound care, please contact the [address] [place] [address] at [address] [place] [address] 8:00 am - 4:30. If you need help with your wound outside these hours and cannot wait until we are again available, contact your PCP or go to the hospital emergency room. PLEASE NOTE: IF YOU ARE UNABLE TO OBTAIN WOUND SUPPLIES, CONTINUE TO USE THE SUPPLIES YOU HAVE AVAILABLE UNTIL YOU ARE ABLE TO REACH US. IT IS MOST IMPORTANT TO KEEP THE WOUND COVERED AT ALL TIMES.      Physician Signature:_______________________    Date: ___________ Time:  ____________

## 2019-12-27 NOTE — PROGRESS NOTES
[address]  WOUND CARE PROGRESS NOTE    Name:  Shama Unger  MR#:  [id number]  :  1960  ACCOUNT #:  [de-identified]  DATE OF SERVICE:  2019    SUBJECTIVE:  The patient returns for treatment of bilateral diabetic foot ulcers E11.621. Since last seen, we were able to get a CT scan of her right thigh, which was the area of pain. I explained to the patient and I called her over the week to explain her the findings that she had no fluid collection. There is mild subcutaneous stranding in the posterior distal thigh, lateral to the knee, and along the lateral side of the right lower leg. There was no evidence of a focal drainable fluid collection. When the patient presents today, she states that the discomfort in the proximal thigh is significantly improved. I explained to her that this is a reaction to inflammation or infection, but usually will respond on its own and therefore, no further treatment is warranted. OBJECTIVE:  VITAL SIGNS:  Her temperature is 96.5, pulse 86, respirations 16, and blood pressure 180/79. WOUND EXAMINATION:  The patient has three diabetic foot ulcers. She has a new wound of the left proximal heel, which measures 2.0 x 1.8 x 0.1 cm. She has a wound of the left lateral right foot which is 0.8 x 1.0 x 0.2 cm and the wound of the left plantar heel of 2.6 x 2.3 x 0.4 cm. I recommended that all calluses be pared down, all wounds be debrided, and I explained the risks and benefits. The patient understood and wished to proceed. Therefore, topical Xylocaine 4% gel was applied for ten minutes to all three wounds. Using a 5-mm ring curette, the wounds were debrided of all devitalized tissue down to healthy subcutaneous tissue and down to muscle. All calluses were pared down. Post-debridement dimensions of the left proximal heel wound are 1.7 x 2.0 x 0.2 cm.     The post-debridement dimensions of right lateral plantar heel wound after debridement were 0.9 x 1.4 x 0.2 cm. The post-debridement dimensions of the left plantar heel wound were 3.1 x 2.6 x 0.4 cm. Hemostasis was achieved with pressure until dry. After ten minutes of firm pressure, there was still two bleeder sites on the distal heel wound. These were cauterized with a light touch with silver nitrate until dry and irrigated clear with saline. The wounds were then scrubbed with chlorhexidine followed by normal saline. ASSESSMENT/PLAN:  We are going to stay with the same dressings of Endoform and Hydrofera blue to the right heel wound. For the left leg, we are going to continue Medihoney pads. The patient will wear her diabetic shoes. She is going to return in four days to be seen by Dr. Joan Holbrook for application of a left total contact cast.  All questions were answered. Her condition on discharge is stable.         Nata Espinal MD      [id number]  D:  12/27/2019 9:38  T:  12/27/2019 10:56  JOB #:  [id number]

## 2019-12-27 NOTE — WOUND CARE
12/27/19 6595 Wound Heel Left;Proximal 12/17/19 Date First Assessed/Time First Assessed: 12/17/19 1530   Present on Hospital Admission: Yes  Wound Approximate Age at First Assessment (Weeks): 1 weeks  Primary Wound Type: Diabetic Ulcer  Location: Heel  Wound Location Orientation: Left;Proximal  Evgeny. .. Dressing Status Removed Dressing Type Gauze;Gauze wrap (arnie) Non-staged Wound Description Full thickness Wound Length (cm) 2 cm Wound Width (cm) 1.8 cm Wound Depth (cm) 0.1 cm Wound Surface Area (cm^2) 3.6 cm^2 Wound Volume (cm^3) 0.36 cm^3 Condition of Base Pink Condition of Edges Open Drainage Amount Large Drainage Color Serosanguinous Wound Odor None Sandra-wound Assessment Intact Cleansing and Cleansing Agents  Normal saline Dressing Changed Changed/New Dressing Type Applied Gauze;Gauze wrap (arnie); Special tape (comment) (Medi Honey Alginate after cleansing site with Normal saline) Wound Procedure Type Debridement- Surgical  
Procedure Time Out 0900 Consent Obtained  Yes Procedure Bleeding Moderate Procedure Hemostasis  Silver Nitrate Type of Tissue Removed  Devitalized Procedure Instrument  Curette Procedure Pain Scale Numeric 0/10 Debridement Procedure Performed by Dr Tucker American Post-Procedure Length (cm) 1.7 cm Post-Procedure Width (cm) 2 cm Post-Procedure Depth (cm) 0.2 cm Post-Procedure Volume (cm^3) 0.68 cm^3 Post-Procedure Surface Area (cm^2) 3.4 cm^2 Procedure Tolerated Well Wound Foot Right;Plantar;Lateral 07/30/19 Date First Assessed/Time First Assessed: 07/30/19 1034   Present on Hospital Admission: Yes  Wound Approximate Age at First Assessment (Weeks): 1 weeks  Primary Wound Type: Diabetic Ulcer  Location: Foot  Wound Location Orientation: Right;Plantar;Late. .. Dressing Status Removed Dressing Type Gauze;Gauze wrap (arnie) Wound Length (cm) 0.8 cm Wound Width (cm) 1 cm Wound Depth (cm) 0.2 cm Wound Surface Area (cm^2) 0.8 cm^2 Wound Volume (cm^3) 0.16 cm^3 Condition of Base Pink Condition of Edges Calloused Drainage Amount Moderate Drainage Color Serosanguinous Wound Odor None Sandra-wound Assessment Intact Cleansing and Cleansing Agents  Normal saline Dressing Changed Changed/New Dressing Type Applied Gauze;Gauze wrap (arnie); Special tape (comment) 
(Endoform;HFBR after cleansing site with Normal saline) Wound Procedure Type Debridement- Surgical  
Procedure Time Out 0900 Consent Obtained  Yes Procedure Bleeding Moderate Procedure Hemostasis  Silver Nitrate Type of Tissue Removed  Devitalized Procedure Instrument  Curette Procedure Pain Scale Numeric 0/10 Debridement Procedure Performed by Dr. Tucker American Post-Procedure Length (cm) 0.9 cm Post-Procedure Width (cm) 1.4 cm Post-Procedure Depth (cm) 0.2 cm Post-Procedure Volume (cm^3) 0.25 cm^3 Post-Procedure Surface Area (cm^2) 1.26 cm^2 Procedure Tolerated Well Wound Foot Left;Plantar Date First Assessed/Time First Assessed: 06/21/19 0857   Present on Hospital Admission: Yes  Primary Wound Type: Diabetic  Location: Foot  Wound Location Orientation: Left;Plantar Dressing Status Removed Dressing Type Gauze;Gauze wrap (arnie) Wound Length (cm) 2.6 cm Wound Width (cm) 2.3 cm Wound Depth (cm) 0.4 cm Wound Surface Area (cm^2) 5.98 cm^2 Wound Volume (cm^3) 2.39 cm^3 Condition of Base Pink;Slough Condition of Edges Calloused Drainage Amount Moderate Drainage Color Serosanguinous Wound Odor None Sandra-wound Assessment Intact Cleansing and Cleansing Agents  Normal saline Dressing Changed Changed/New Dressing Type Applied Gauze;Gauze wrap (arnie); Special tape (comment) (Medi Honey Alginate afte cleansing site with Normal Saline) Wound Procedure Type Debridement- Surgical  
Procedure Time Out 0900 Consent Obtained  Yes Procedure Bleeding Moderate Procedure Hemostasis  Silver Nitrate Type of Tissue Removed  Devitalized Procedure Instrument  Curette Procedure Pain Scale Numeric 0/10 Debridement Procedure Performed by Dr Dottie Du Post-Procedure Length (cm) 3.1 cm Post-Procedure Width (cm) 2.6 cm Post-Procedure Depth (cm) 0.4 cm Post-Procedure Volume (cm^3) 3.22 cm^3 Post-Procedure Surface Area (cm^2) 8.06 cm^2 Post Procedure Pain Scale Numeric 0/10 Procedure Tolerated Well  
 
 12/27/19 0334 Wound Heel Left;Proximal 12/17/19 Date First Assessed/Time First Assessed: 12/17/19 1530   Present on Hospital Admission: Yes  Wound Approximate Age at First Assessment (Weeks): 1 weeks  Primary Wound Type: Diabetic Ulcer  Location: Heel  Wound Location Orientation: Left;Proximal  Evgeny. .. Dressing Status Removed Dressing Type Gauze;Gauze wrap (arnie) Non-staged Wound Description Full thickness Wound Length (cm) 2 cm Wound Width (cm) 1.8 cm Wound Depth (cm) 0.1 cm Wound Surface Area (cm^2) 3.6 cm^2 Wound Volume (cm^3) 0.36 cm^3 Condition of Base Pink Condition of Edges Open Drainage Amount Large Drainage Color Serosanguinous Wound Odor None Sandra-wound Assessment Intact Cleansing and Cleansing Agents  Normal saline Dressing Changed Changed/New Dressing Type Applied Gauze;Gauze wrap (arnie); Special tape (comment) (Medi Honey Alginate after cleansing site with Normal saline) Wound Procedure Type Debridement- Surgical  
Procedure Time Out 0900 Consent Obtained  Yes Procedure Bleeding Moderate Procedure Hemostasis  Silver Nitrate Type of Tissue Removed  Devitalized Procedure Instrument  Curette Procedure Pain Scale Numeric 0/10 Debridement Procedure Performed by Dr Dottie Du Post-Procedure Length (cm) 1.7 cm Post-Procedure Width (cm) 2 cm Post-Procedure Depth (cm) 0.2 cm Post-Procedure Volume (cm^3) 0.68 cm^3 Post-Procedure Surface Area (cm^2) 3.4 cm^2 Procedure Tolerated Well Wound Foot Right;Plantar;Lateral 07/30/19 Date First Assessed/Time First Assessed: 07/30/19 1034   Present on Hospital Admission: Yes  Wound Approximate Age at First Assessment (Weeks): 1 weeks  Primary Wound Type: Diabetic Ulcer  Location: Foot  Wound Location Orientation: Right;Plantar;Late. .. Dressing Status Removed Dressing Type Gauze;Gauze wrap (arnie) Wound Length (cm) 0.8 cm Wound Width (cm) 1 cm Wound Depth (cm) 0.2 cm Wound Surface Area (cm^2) 0.8 cm^2 Wound Volume (cm^3) 0.16 cm^3 Condition of Base Pink Condition of Edges Calloused Drainage Amount Moderate Drainage Color Serosanguinous Wound Odor None Sandra-wound Assessment Intact Cleansing and Cleansing Agents  Normal saline Dressing Changed Changed/New Dressing Type Applied Gauze;Gauze wrap (arnie); Special tape (comment) 
(Endoform;HFBR after cleansing site with Normal saline) Wound Procedure Type Debridement- Surgical  
Procedure Time Out 0900 Consent Obtained  Yes Procedure Bleeding Moderate Procedure Hemostasis  Silver Nitrate Type of Tissue Removed  Devitalized Procedure Instrument  Curette Procedure Pain Scale Numeric 0/10 Debridement Procedure Performed by Dr. Tinnie Peabody Post-Procedure Length (cm) 0.9 cm Post-Procedure Width (cm) 1.4 cm Post-Procedure Depth (cm) 0.2 cm Post-Procedure Volume (cm^3) 0.25 cm^3 Post-Procedure Surface Area (cm^2) 1.26 cm^2 Procedure Tolerated Well Wound Foot Left;Plantar Date First Assessed/Time First Assessed: 06/21/19 0857   Present on Hospital Admission: Yes  Primary Wound Type: Diabetic  Location: Foot  Wound Location Orientation: Left;Plantar Dressing Status Removed Dressing Type Gauze;Gauze wrap (arnie) Wound Length (cm) 2.6 cm Wound Width (cm) 2.3 cm Wound Depth (cm) 0.4 cm Wound Surface Area (cm^2) 5.98 cm^2 Wound Volume (cm^3) 2.39 cm^3 Condition of Base Pink;Slough Condition of Edges Calloused Drainage Amount Moderate Drainage Color Serosanguinous Wound Odor None Sandra-wound Assessment Intact Cleansing and Cleansing Agents  Normal saline Dressing Changed Changed/New Dressing Type Applied Gauze;Gauze wrap (arnie); Special tape (comment) (Medi Honey Alginate afte cleansing site with Normal Saline) Wound Procedure Type Debridement- Surgical  
Procedure Time Out 0900 Consent Obtained  Yes Procedure Bleeding Moderate Procedure Hemostasis  Silver Nitrate Type of Tissue Removed  Devitalized Procedure Instrument  Curette Procedure Pain Scale Numeric 0/10 Debridement Procedure Performed by Dr Bishop Weber Post-Procedure Length (cm) 3.1 cm Post-Procedure Width (cm) 2.6 cm Post-Procedure Depth (cm) 0.4 cm Post-Procedure Volume (cm^3) 3.22 cm^3 Post-Procedure Surface Area (cm^2) 8.06 cm^2 Post Procedure Pain Scale Numeric 0/10 Procedure Tolerated Well  
 
 12/27/19 4755 Wound Heel Left;Proximal 12/17/19 Date First Assessed/Time First Assessed: 12/17/19 1530   Present on Hospital Admission: Yes  Wound Approximate Age at First Assessment (Weeks): 1 weeks  Primary Wound Type: Diabetic Ulcer  Location: Heel  Wound Location Orientation: Left;Proximal  Evgeny. .. Dressing Status Removed Dressing Type Gauze;Gauze wrap (arnie) Non-staged Wound Description Full thickness Wound Length (cm) 2 cm Wound Width (cm) 1.8 cm Wound Depth (cm) 0.1 cm Wound Surface Area (cm^2) 3.6 cm^2 Wound Volume (cm^3) 0.36 cm^3 Condition of Base Pink Condition of Edges Open Drainage Amount Large Drainage Color Serosanguinous Wound Odor None Sandra-wound Assessment Intact Cleansing and Cleansing Agents  Normal saline Dressing Changed Changed/New Dressing Type Applied Gauze;Gauze wrap (arnie); Special tape (comment) (Medi Honey Alginate after cleansing site with Normal saline) Wound Procedure Type Debridement- Surgical  
Procedure Time Out 0900 Consent Obtained  Yes Procedure Bleeding Moderate Procedure Hemostasis  Silver Nitrate Type of Tissue Removed  Devitalized Procedure Instrument  Curette Procedure Pain Scale Numeric 0/10 Debridement Procedure Performed by Dr Bishop Weber Post-Procedure Length (cm) 1.7 cm Post-Procedure Width (cm) 2 cm Post-Procedure Depth (cm) 0.2 cm Post-Procedure Volume (cm^3) 0.68 cm^3 Post-Procedure Surface Area (cm^2) 3.4 cm^2 Procedure Tolerated Well Wound Foot Right;Plantar;Lateral 07/30/19 Date First Assessed/Time First Assessed: 07/30/19 1034   Present on Hospital Admission: Yes  Wound Approximate Age at First Assessment (Weeks): 1 weeks  Primary Wound Type: Diabetic Ulcer  Location: Foot  Wound Location Orientation: Right;Plantar;Late. .. Dressing Status Removed Dressing Type Gauze;Gauze wrap (arnie) Wound Length (cm) 0.8 cm Wound Width (cm) 1 cm Wound Depth (cm) 0.2 cm Wound Surface Area (cm^2) 0.8 cm^2 Wound Volume (cm^3) 0.16 cm^3 Condition of Base Pink Condition of Edges Calloused Drainage Amount Moderate Drainage Color Serosanguinous Wound Odor None Sandra-wound Assessment Intact Cleansing and Cleansing Agents  Normal saline Dressing Changed Changed/New Dressing Type Applied Gauze;Gauze wrap (arnie); Special tape (comment) 
(Endoform;HFBR after cleansing site with Normal saline) Wound Procedure Type Debridement- Surgical  
Procedure Time Out 0900 Consent Obtained  Yes Procedure Bleeding Moderate Procedure Hemostasis  Silver Nitrate Type of Tissue Removed  Devitalized Procedure Instrument  Curette Procedure Pain Scale Numeric 0/10 Debridement Procedure Performed by Dr. Bishop Weber Post-Procedure Length (cm) 0.9 cm Post-Procedure Width (cm) 1.4 cm Post-Procedure Depth (cm) 0.2 cm Post-Procedure Volume (cm^3) 0.25 cm^3 Post-Procedure Surface Area (cm^2) 1.26 cm^2 Procedure Tolerated Well Wound Foot Left;Plantar Date First Assessed/Time First Assessed: 06/21/19 0857   Present on Hospital Admission: Yes  Primary Wound Type: Diabetic  Location: Foot  Wound Location Orientation: Left;Plantar Dressing Status Removed Dressing Type Gauze;Gauze wrap (arnie) Wound Length (cm) 2.6 cm Wound Width (cm) 2.3 cm Wound Depth (cm) 0.4 cm Wound Surface Area (cm^2) 5.98 cm^2 Wound Volume (cm^3) 2.39 cm^3 Condition of Base Pink;Slough Condition of Edges Calloused Drainage Amount Moderate Drainage Color Serosanguinous Wound Odor None Sandra-wound Assessment Intact Cleansing and Cleansing Agents  Normal saline Dressing Changed Changed/New Dressing Type Applied Gauze;Gauze wrap (arnie); Special tape (comment) (Medi Honey Alginate afte cleansing site with Normal Saline) Wound Procedure Type Debridement- Surgical  
Procedure Time Out 0900 Consent Obtained  Yes Procedure Bleeding Moderate Procedure Hemostasis  Silver Nitrate Type of Tissue Removed  Devitalized Procedure Instrument  Curette Procedure Pain Scale Numeric 0/10 Debridement Procedure Performed by Dr Ashley Mcdonald Post-Procedure Length (cm) 3.1 cm Post-Procedure Width (cm) 2.6 cm Post-Procedure Depth (cm) 0.4 cm Post-Procedure Volume (cm^3) 3.22 cm^3 Post-Procedure Surface Area (cm^2) 8.06 cm^2 Post Procedure Pain Scale Numeric 0/10 Procedure Tolerated Well  
 
 12/27/19 2430 Wound Heel Left;Proximal 12/17/19 Date First Assessed/Time First Assessed: 12/17/19 1530   Present on Hospital Admission: Yes  Wound Approximate Age at First Assessment (Weeks): 1 weeks  Primary Wound Type: Diabetic Ulcer  Location: Heel  Wound Location Orientation: Left;Proximal  Evgeny. .. Dressing Status Removed Dressing Type Gauze;Gauze wrap (arnie) Non-staged Wound Description Full thickness Wound Length (cm) 2 cm Wound Width (cm) 1.8 cm Wound Depth (cm) 0.1 cm Wound Surface Area (cm^2) 3.6 cm^2 Wound Volume (cm^3) 0.36 cm^3 Condition of Base Pink Condition of Edges Open Drainage Amount Large Drainage Color Serosanguinous Wound Odor None Sandra-wound Assessment Intact Cleansing and Cleansing Agents  Normal saline Dressing Changed Changed/New Dressing Type Applied Gauze;Gauze wrap (arnie); Special tape (comment) (Medi Honey Alginate after cleansing site with Normal saline) Wound Procedure Type Debridement- Surgical  
Procedure Time Out 0900 Consent Obtained  Yes Procedure Bleeding Moderate Procedure Hemostasis  Silver Nitrate Type of Tissue Removed  Devitalized Procedure Instrument  Curette Procedure Pain Scale Numeric 0/10 Debridement Procedure Performed by Dr Sumner Bottom Post-Procedure Length (cm) 1.7 cm Post-Procedure Width (cm) 2 cm Post-Procedure Depth (cm) 0.2 cm Post-Procedure Volume (cm^3) 0.68 cm^3 Post-Procedure Surface Area (cm^2) 3.4 cm^2 Procedure Tolerated Well Wound Foot Right;Plantar;Lateral 07/30/19 Date First Assessed/Time First Assessed: 07/30/19 1034   Present on Hospital Admission: Yes  Wound Approximate Age at First Assessment (Weeks): 1 weeks  Primary Wound Type: Diabetic Ulcer  Location: Foot  Wound Location Orientation: Right;Plantar;Late. .. Dressing Status Removed Dressing Type Gauze;Gauze wrap (arnie) Wound Length (cm) 0.8 cm Wound Width (cm) 1 cm Wound Depth (cm) 0.2 cm Wound Surface Area (cm^2) 0.8 cm^2 Wound Volume (cm^3) 0.16 cm^3 Condition of Base Pink Condition of Edges Calloused Drainage Amount Moderate Drainage Color Serosanguinous Wound Odor None Sandra-wound Assessment Intact Cleansing and Cleansing Agents  Normal saline Dressing Changed Changed/New Dressing Type Applied Gauze;Gauze wrap (arnie); Special tape (comment) 
(Endoform;HFBR after cleansing site with Normal saline) Wound Procedure Type Debridement- Surgical  
Procedure Time Out 0900 Consent Obtained  Yes Procedure Bleeding Moderate Procedure Hemostasis  Silver Nitrate Type of Tissue Removed  Devitalized Procedure Instrument  Curette Procedure Pain Scale Numeric 0/10 Debridement Procedure Performed by Dr. Kelsea Becker Post-Procedure Length (cm) 0.9 cm Post-Procedure Width (cm) 1.4 cm Post-Procedure Depth (cm) 0.2 cm Post-Procedure Volume (cm^3) 0.25 cm^3 Post-Procedure Surface Area (cm^2) 1.26 cm^2 Procedure Tolerated Well Wound Foot Left;Plantar Date First Assessed/Time First Assessed: 06/21/19 0857   Present on Hospital Admission: Yes  Primary Wound Type: Diabetic  Location: Foot  Wound Location Orientation: Left;Plantar Dressing Status Removed Dressing Type Gauze;Gauze wrap (arnie) Wound Length (cm) 2.6 cm Wound Width (cm) 2.3 cm Wound Depth (cm) 0.4 cm Wound Surface Area (cm^2) 5.98 cm^2 Wound Volume (cm^3) 2.39 cm^3 Condition of Base Pink;Slough Condition of Edges Calloused Drainage Amount Moderate Drainage Color Serosanguinous Wound Odor None Sandra-wound Assessment Intact Cleansing and Cleansing Agents  Normal saline Dressing Changed Changed/New Dressing Type Applied Gauze;Gauze wrap (arnie); Special tape (comment) (Medi Honey Alginate afte cleansing site with Normal Saline) Wound Procedure Type Debridement- Surgical  
Procedure Time Out 0900 Consent Obtained  Yes Procedure Bleeding Moderate Procedure Hemostasis  Silver Nitrate Type of Tissue Removed  Devitalized Procedure Instrument  Curette Procedure Pain Scale Numeric 0/10 Debridement Procedure Performed by Dr Kelsea Becker Post-Procedure Length (cm) 3.1 cm Post-Procedure Width (cm) 2.6 cm Post-Procedure Depth (cm) 0.4 cm Post-Procedure Volume (cm^3) 3.22 cm^3 Post-Procedure Surface Area (cm^2) 8.06 cm^2 Post Procedure Pain Scale Numeric 0/10 Procedure Tolerated Well  
 
 12/27/19 5131 Wound Heel Left;Proximal 12/17/19 Date First Assessed/Time First Assessed: 12/17/19 1530   Present on Hospital Admission: Yes  Wound Approximate Age at First Assessment (Weeks): 1 weeks  Primary Wound Type: Diabetic Ulcer  Location: Heel  Wound Location Orientation: Left;Proximal  Evgeny. .. Dressing Status Removed Dressing Type Gauze;Gauze wrap (arnie) Non-staged Wound Description Full thickness Wound Length (cm) 2 cm Wound Width (cm) 1.8 cm Wound Depth (cm) 0.1 cm Wound Surface Area (cm^2) 3.6 cm^2 Wound Volume (cm^3) 0.36 cm^3 Condition of Base Pink Condition of Edges Open Drainage Amount Large Drainage Color Serosanguinous Wound Odor None Sandra-wound Assessment Intact Cleansing and Cleansing Agents  Normal saline Dressing Changed Changed/New Dressing Type Applied Gauze;Gauze wrap (arnie); Special tape (comment) (Medi Honey Alginate after cleansing site with Normal saline) Wound Procedure Type Debridement- Surgical  
Procedure Time Out 0900 Consent Obtained  Yes Procedure Bleeding Moderate Procedure Hemostasis  Silver Nitrate Type of Tissue Removed  Devitalized Procedure Instrument  Curette Procedure Pain Scale Numeric 0/10 Debridement Procedure Performed by Dr Chris Lama Post-Procedure Length (cm) 1.7 cm Post-Procedure Width (cm) 2 cm Post-Procedure Depth (cm) 0.2 cm Post-Procedure Volume (cm^3) 0.68 cm^3 Post-Procedure Surface Area (cm^2) 3.4 cm^2 Procedure Tolerated Well Wound Foot Right;Plantar;Lateral 07/30/19 Date First Assessed/Time First Assessed: 07/30/19 1034   Present on Hospital Admission: Yes  Wound Approximate Age at First Assessment (Weeks): 1 weeks  Primary Wound Type: Diabetic Ulcer  Location: Foot  Wound Location Orientation: Right;Plantar;Late. .. Dressing Status Removed Dressing Type Gauze;Gauze wrap (arnie) Wound Length (cm) 0.8 cm Wound Width (cm) 1 cm Wound Depth (cm) 0.2 cm Wound Surface Area (cm^2) 0.8 cm^2 Wound Volume (cm^3) 0.16 cm^3 Condition of Base Pink Condition of Edges Calloused Drainage Amount Moderate Drainage Color Serosanguinous Wound Odor None Sandra-wound Assessment Intact Cleansing and Cleansing Agents  Normal saline Dressing Changed Changed/New Dressing Type Applied Gauze;Gauze wrap (arnie); Special tape (comment) 
(Endoform;HFBR after cleansing site with Normal saline) Wound Procedure Type Debridement- Surgical  
Procedure Time Out 0900 Consent Obtained  Yes Procedure Bleeding Moderate Procedure Hemostasis  Silver Nitrate Type of Tissue Removed  Devitalized Procedure Instrument  Curette Procedure Pain Scale Numeric 0/10 Debridement Procedure Performed by Dr. Mary Hinkle Post-Procedure Length (cm) 0.9 cm Post-Procedure Width (cm) 1.4 cm Post-Procedure Depth (cm) 0.2 cm Post-Procedure Volume (cm^3) 0.25 cm^3 Post-Procedure Surface Area (cm^2) 1.26 cm^2 Procedure Tolerated Well Wound Foot Left;Plantar Date First Assessed/Time First Assessed: 06/21/19 0857   Present on Hospital Admission: Yes  Primary Wound Type: Diabetic  Location: Foot  Wound Location Orientation: Left;Plantar Dressing Status Removed Dressing Type Gauze;Gauze wrap (arnie) Wound Length (cm) 2.6 cm Wound Width (cm) 2.3 cm Wound Depth (cm) 0.4 cm Wound Surface Area (cm^2) 5.98 cm^2 Wound Volume (cm^3) 2.39 cm^3 Condition of Base Pink;Slough Condition of Edges Calloused Drainage Amount Moderate Drainage Color Serosanguinous Wound Odor None Sandra-wound Assessment Intact Cleansing and Cleansing Agents  Normal saline Dressing Changed Changed/New Dressing Type Applied Gauze;Gauze wrap (arnie); Special tape (comment) (Medi Honey Alginate afte cleansing site with Normal Saline) Wound Procedure Type Debridement- Surgical  
Procedure Time Out 0900 Consent Obtained  Yes Procedure Bleeding Moderate Procedure Hemostasis  Silver Nitrate Type of Tissue Removed  Devitalized Procedure Instrument  Curette Procedure Pain Scale Numeric 0/10 Debridement Procedure Performed by Dr Mary Hinkle Post-Procedure Length (cm) 3.1 cm  
 Post-Procedure Width (cm) 2.6 cm Post-Procedure Depth (cm) 0.4 cm Post-Procedure Volume (cm^3) 3.22 cm^3 Post-Procedure Surface Area (cm^2) 8.06 cm^2 Post Procedure Pain Scale Numeric 0/10 Procedure Tolerated Well  
 
 12/27/19 0824 Wound Heel Left;Proximal 12/17/19 Date First Assessed/Time First Assessed: 12/17/19 1530   Present on Hospital Admission: Yes  Wound Approximate Age at First Assessment (Weeks): 1 weeks  Primary Wound Type: Diabetic Ulcer  Location: Heel  Wound Location Orientation: Left;Proximal  Evgeny. .. Dressing Status Removed Dressing Type Gauze;Gauze wrap (arnie) Non-staged Wound Description Full thickness Wound Length (cm) 2 cm Wound Width (cm) 1.8 cm Wound Depth (cm) 0.1 cm Wound Surface Area (cm^2) 3.6 cm^2 Wound Volume (cm^3) 0.36 cm^3 Condition of Base Pink Condition of Edges Open Drainage Amount Large Drainage Color Serosanguinous Wound Odor None Sandra-wound Assessment Intact Cleansing and Cleansing Agents  Normal saline Dressing Changed Changed/New Dressing Type Applied Gauze;Gauze wrap (arnie); Special tape (comment) (Medi Honey Alginate after cleansing site with Normal saline) Wound Procedure Type Debridement- Surgical  
Procedure Time Out 0900 Consent Obtained  Yes Procedure Bleeding Moderate Procedure Hemostasis  Silver Nitrate Type of Tissue Removed  Devitalized Procedure Instrument  Curette Procedure Pain Scale Numeric 0/10 Debridement Procedure Performed by Dr Samantha Palacios Post-Procedure Length (cm) 1.7 cm Post-Procedure Width (cm) 2 cm Post-Procedure Depth (cm) 0.2 cm Post-Procedure Volume (cm^3) 0.68 cm^3 Post-Procedure Surface Area (cm^2) 3.4 cm^2 Procedure Tolerated Well Wound Foot Right;Plantar;Lateral 07/30/19 Date First Assessed/Time First Assessed: 07/30/19 1034   Present on Hospital Admission: Yes  Wound Approximate Age at First Assessment (Weeks): 1 weeks  Primary Wound Type: Diabetic Ulcer  Location: Foot  Wound Location Orientation: Right;Plantar;Late. .. Dressing Status Removed Dressing Type Gauze;Gauze wrap (arnie) Wound Length (cm) 0.8 cm Wound Width (cm) 1 cm Wound Depth (cm) 0.2 cm Wound Surface Area (cm^2) 0.8 cm^2 Wound Volume (cm^3) 0.16 cm^3 Condition of Base Pink Condition of Edges Calloused Drainage Amount Moderate Drainage Color Serosanguinous Wound Odor None Sandra-wound Assessment Intact Cleansing and Cleansing Agents  Normal saline Dressing Changed Changed/New Dressing Type Applied Gauze;Gauze wrap (arnie); Special tape (comment) 
(Endoform;HFBR after cleansing site with Normal saline) Wound Procedure Type Debridement- Surgical  
Procedure Time Out 0900 Consent Obtained  Yes Procedure Bleeding Moderate Procedure Hemostasis  Silver Nitrate Type of Tissue Removed  Devitalized Procedure Instrument  Curette Procedure Pain Scale Numeric 0/10 Debridement Procedure Performed by Dr. Kalie Bah Post-Procedure Length (cm) 0.9 cm Post-Procedure Width (cm) 1.4 cm Post-Procedure Depth (cm) 0.2 cm Post-Procedure Volume (cm^3) 0.25 cm^3 Post-Procedure Surface Area (cm^2) 1.26 cm^2 Procedure Tolerated Well Wound Foot Left;Plantar Date First Assessed/Time First Assessed: 06/21/19 0857   Present on Hospital Admission: Yes  Primary Wound Type: Diabetic  Location: Foot  Wound Location Orientation: Left;Plantar Dressing Status Removed Dressing Type Gauze;Gauze wrap (arnie) Wound Length (cm) 2.6 cm Wound Width (cm) 2.3 cm Wound Depth (cm) 0.4 cm Wound Surface Area (cm^2) 5.98 cm^2 Wound Volume (cm^3) 2.39 cm^3 Condition of Base Pink;Slough Condition of Edges Calloused Drainage Amount Moderate Drainage Color Serosanguinous Wound Odor None Sandra-wound Assessment Intact Cleansing and Cleansing Agents  Normal saline Dressing Changed Changed/New Dressing Type Applied Gauze;Gauze wrap (arnie); Special tape (comment) (Medi Honey Alginate afte cleansing site with Normal Saline) Wound Procedure Type Debridement- Surgical  
Procedure Time Out 0900 Consent Obtained  Yes Procedure Bleeding Moderate Procedure Hemostasis  Silver Nitrate Type of Tissue Removed  Devitalized Procedure Instrument  Curette Procedure Pain Scale Numeric 0/10 Debridement Procedure Performed by Dr Greer Wellington Post-Procedure Length (cm) 3.1 cm Post-Procedure Width (cm) 2.6 cm Post-Procedure Depth (cm) 0.4 cm Post-Procedure Volume (cm^3) 3.22 cm^3 Post-Procedure Surface Area (cm^2) 8.06 cm^2 Post Procedure Pain Scale Numeric 0/10 Procedure Tolerated Well Discharge Condition: Stable Pain: 0 Ambulatory Status: Walking Discharge Destination: Home Transportation: Car Accompanied by: Family/Caregiver Discharge instructions reviewed with Patient and copy or written instructions have been provided. All questions/concerns have been addressed at this time.

## 2019-12-31 ENCOUNTER — HOSPITAL ENCOUNTER (OUTPATIENT)
Dept: WOUND CARE | Age: 59
Discharge: HOME | End: 2019-12-31
Payer: COMMERCIAL

## 2019-12-31 VITALS
SYSTOLIC BLOOD PRESSURE: 147 MMHG | TEMPERATURE: 96.7 F | HEART RATE: 65 BPM | RESPIRATION RATE: 16 BRPM | DIASTOLIC BLOOD PRESSURE: 63 MMHG

## 2019-12-31 DIAGNOSIS — I48.0 PAF (PAROXYSMAL ATRIAL FIBRILLATION) (HCC): ICD-10-CM

## 2019-12-31 PROCEDURE — 29445 APPL RIGID TOT CNTC LEG CAST: CPT

## 2019-12-31 PROCEDURE — 97597 DBRDMT OPN WND 1ST 20 CM/<: CPT

## 2019-12-31 NOTE — PROGRESS NOTES
Patient presents to wound clinic for: Liudmila Reis is a 60 y.o. female who presents for wound care of bilateral plantar foot ulcers. Patient currently being treated by Dr. Yary Calzada for a wound of the left breast, and was referred to me for further offloading and wound care recommendations of the bilateral heel ulcers. The ulcer of the plantar left heel occurred first and patient was placed in a heel offloading shoe. She then subsequently developed an ulceration of the plantar 5th met base of the right foot. She has been wearing her new diabetic shoes without any issues. Denies f/c/n/v/d. She notes that she is prepared today to go into a total contact cast to the E. Has not had any issues since she saw Dr. Yary Calzada last week.       Pertinent Medical History:  Past Medical History:   Diagnosis Date    Acquired lymphedema     Right arm    Arrhythmia     Asthma     Atrial fibrillation (HCC)     Dr. Diana Mccartney and Dr. Meaghan Belcher Atrial flutter ([facility] [address]     Cancer [facility])     bilateral breast    Chronic kidney disease     Diabetes mellitus type II     Dizziness     Essential hypertension     Hyperlipidemia     Hypertension     Long term (current) use of anticoagulants     Morbid obesity ([facility] [address] 3/14/2012    Osteoarthritis     Pacemaker     Sick sinus syndrome ([facility] [address]      Past Surgical History:   Procedure Laterality Date    ABDOMEN SURGERY PROC UNLISTED      gastric bypass    GASTRIC BYPASS,OBESE<150CM [facility]EN[facility]Y  7/08    [facility]    HX AFIB ABLATION      HX CARPAL TUNNEL RELEASE      HX CERVICAL FUSION  1994    HX DILATION AND CURETTAGE  1992    HX MASTECTOMY  1998    RIGHT MODIFIED RADICAL     HX MOHS PROCEDURES  1995    right    HX ORTHOPAEDIC      ight knee surgery    HX PACEMAKER      IR INSERT TUNL CVC W PORT OVER 5 YEARS      [address]    NEEDLE BIOPSY LIVER,W OTHR [facility]  8.21.07    PA Mary Krishnamurthy 95 [address] GUIDE ASP OVARIAN CYST ABD APPROACH  8.21.07    RIGHT      Prior to Admission medications    Medication Sig Start Date End Date Taking? Authorizing Provider   warfarin (COUMADIN) 4 mg tablet Take 1 tablet on Mon and Thurs and a half tablet the other 5 days. 12/12/19  Yes Rosa Gurrola MD   potassium chloride SR (KLOR-CON 10) 10 mEq tablet Take 10 mEq by mouth daily. Indications: Patient states she intends to take this medication untl she discusses with Dr. Desmond Dent on 12-12-19. Yes Provider, Historical   insulin glargine (LANTUS U-100 INSULIN) 100 unit/mL injection Inject 20 units daily 11/10/19  Yes Rosa Gurrola MD   hydrALAZINE (APRESOLINE) 100 mg tablet TAKE ONE TABLET BY MOUTH THREE TIMES A DAY 10/21/19  Yes Rosa Gurrola MD   sertraline (ZOLOFT) 50 mg tablet TAKE ONE AND ONE-HALF (1 & 1/2) TABLET BY MOUTH DAILY 8/22/19  Yes Rosa Gurrola MD   bumetanide (BUMEX) 1 mg tablet Take 2 mg by mouth daily. Yes Provider, Historical   cloNIDine HCl (CATAPRES) 0.3 mg tablet Take 0.6 mg by mouth nightly. Yes Provider, Historical   metoprolol succinate (TOPROL-XL) 100 mg tablet TAKE TWO TABLETS BY MOUTH DAILY 7/12/19  Yes Rosa Gurrola MD   doxazosin (CARDURA) 4 mg tablet TAKE ONE TABLET BY MOUTH ONCE NIGHTLY 6/14/19  Yes Rosa Gurrola MD   busPIRone (BUSPAR) 10 mg tablet TAKE ONE TABLET BY MOUTH TWICE A DAY 5/24/19  Yes Rosa Gurrola MD   metolazone (ZAROXOLYN) 5 mg tablet Take 1 Tab by mouth daily as needed (take if develop increased LE edema, weight gain > 5 pounds. ). 4/10/14  Yes Dayday Grewal NP   cholecalciferol, VITAMIN D3, (VITAMIN D3) 5,000 unit tab tablet Take 5,000 Units by mouth daily. Yes Provider, Historical   vitamin A 8,000 unit capsule Take 8,000 Units by mouth daily.    Yes Provider, Historical   insulin lispro (HUMALOG) 100 unit/mL kwikpen 2 units with dinner for sugars over 200 1/3/14  Yes Rosa Gurrola MD   cyanocobalamin (VITAMIN B-12) 1,000 mcg sublingual tablet Take 1,000 mcg by mouth daily.    Yes Provider, Historical     Allergies   Allergen Reactions    Ace Inhibitors Cough    Amlodipine Swelling    Avapro [Irbesartan] Unable to Obtain    Bactrim [Sulfamethoxazole-Trimethoprim] Other (comments)     Sore mouth    Captopril Cough    Carvedilol Diarrhea    Fish Containing Products Hives    Morphine Nausea and Vomiting and Swelling    Penicillins Hives    Pravachol [Pravastatin] Nausea Only    Seafood [Shellfish Containing Products] Hives    Singulair [Montelukast] Other (comments)     palpitations     Family History   Problem Relation Age of Onset    Cancer Mother     Heart Disease Mother     Alcohol abuse Father     Diabetes Brother     Hypertension Brother      Social History     Socioeconomic History    Marital status:      Spouse name: Not on file    Number of children: Not on file    Years of education: Not on file    Highest education level: Not on file   Occupational History    Not on file   Social Needs    Financial resource strain: Not on file    Food insecurity:     Worry: Not on file     Inability: Not on file    Transportation needs:     Medical: Not on file     Non-medical: Not on file   Tobacco Use    Smoking status: Never Smoker    Smokeless tobacco: Never Used   Substance and Sexual Activity    Alcohol use: Yes     Comment: rare    Drug use: No    Sexual activity: Not on file   Lifestyle    Physical activity:     Days per week: Not on file     Minutes per session: Not on file    Stress: Not on file   Relationships    Social connections:     Talks on phone: Not on file     Gets together: Not on file     Attends Church service: Not on file     Active member of club or organization: Not on file     Attends meetings of clubs or organizations: Not on file     Relationship status: Not on file    Intimate partner violence:     Fear of current or ex partner: Not on file     Emotionally abused: Not on file     Physically abused: Not on file     Forced sexual activity: Not on file   Other Topics Concern    Not on file   Social History Narrative    Not on file       Vitals:    12/31/19 1443   BP: 147/63   Pulse: 65   Resp: 16   Temp: 96.7 °F (35.9 °C)       Review of Systems:    Gen: No fever, chills, malaise, weight loss/gain. Heent: No headache, rhinorrhea, epistaxis, ear pain, hearing loss, sinus pain, neck pain/stiffness, sore throat. Heart: No chest pain, palpitations, CHOW, pnd, or orthopnea. Resp: No cough, hemoptysis, wheezing and shortness of breath. GI: No nausea, vomiting, diarrhea, constipation, melena or hematochezia. : No urinary obstruction, dysuria or hematuria. Derm: see below  Musc/skeletal: no bone or joint complains. Vasc: No edema, cyanosis or claudication. Endo: No heat/cold intolerance, no polyuria,polydipsia or polyphagia. Neuro: No unilateral weakness, numbness, tingling. No seizures. Heme: No easy bruising or bleeding. Wound Description:  Refer to nursing notes for measurements. Ulcer Debridement    Left plantar foot wound    Character of Ulcer: Improved    Indication for Debridement: Slough, Exudate, Abnormal wound edge and Abnormal Wound base     Pre debridement measurements: 2.9 x 2.4 x 0.3 cm    Risks of procedure were discussed with patient. Consent has been signed. Anesthetic: Lidocaine 4% topical cream     Level of Debridement: Subctaneous Tissue , muscule    Tissue and/or Material removed: Exudate, Fibrin/ Slough and Subcutaneous,     Type of Tissue: Non- Viable      Pre-debridement severity: Fat Layer Exposed     Post debridement severity: Fat Layer exposed    Instrument(s) used: Scalpel    Bleeding controlled with: Pressure    Estimated blood loss: Minimal    Pre debridement measurements: 3.0 x 2.5 x 0.4 cm    Post procedural pain: mild    Patient tolerated procedure well.      Right  plantar foot wound    Character of Ulcer: smaller    Indication for Debridement: Slough, Exudate, Abnormal wound edge and Abnormal Wound base     Pre debridement measurements: 0.8 cm x 1 cm x 0.2 cm    Risks of procedure were discussed with patient. Consent has been signed. Anesthetic: Lidocaine 4% topical cream     Level of Debridement: Subctaneous Tissue     Tissue and/or Material removed: Exudate, Fibrin/ Slough and Subcutaneous    Type of Tissue: Non- Viable      Pre-debridement severity: Fat Layer Exposed     Post debridement severity: Fat Layer exposed    Instrument(s) used: Scalpel    Bleeding controlled with: Pressure    Estimated blood loss: Minimal    Postdebridement measurements: 0.9 cm x 1.1 cm x 0.3 cm    Post procedural pain: mild    Patient tolerated procedure well. Infection: none  Edema: mild edema    Lower Extremity Circulation   Reviewed patient's recent ABIs. Shows decrease from when the     Offloading: Offloading padding to shoes    Assessment:  1. Diabetic plantar heel ulcer left foot with associated infection  2. Diabetic ulcer right foot-plantar 5th met base  3. Diabetic peripheral neuropathy  4. B/l cavus foot deformity    Plan:   -Patient seen and evaluated as noted above.  -Wound debridement performed of both feet. -Fortunately both wounds are improving this week. -Right foot dressed with endoform, hydrofera blue, gauze dressing.   Left foot dressed with aquacel and a total contact cast.  -Follow-up next week for change of total contact cast.

## 2019-12-31 NOTE — WOUND CARE
12/31/19 1512   Wound Heel Left;Proximal 12/17/19   Date First Assessed/Time First Assessed: 12/17/19 1530   Present on Hospital Admission: Yes  Wound Approximate Age at First Assessment (Weeks): 1 weeks  Primary Wound Type: Diabetic Ulcer  Location: Heel  Wound Location Orientation: Left;Proximal  Evgeny. .. Wound Procedure Type   (debridement)   Procedure Time Out 1513   Consent Obtained  Yes   Procedure Bleeding Minimal   Procedure Hemostasis  Pressure   Type of Tissue Removed  Devitalized   Procedure Instrument  Blade   Procedure Pain Scale Numeric 0/10   Debridement Procedure Performed by Dr Latoya Crisostomo Right;Plantar;Lateral 07/30/19   Date First Assessed/Time First Assessed: 07/30/19 1034   Present on Hospital Admission: Yes  Wound Approximate Age at First Assessment (Weeks): 1 weeks  Primary Wound Type: Diabetic Ulcer  Location: Foot  Wound Location Orientation: Right;Plantar;Late. ..    Wound Procedure Type   (debridment)   Procedure Time Out 1515   Consent Obtained  Yes   Procedure Bleeding Minimal   Procedure Hemostasis  Pressure   Type of Tissue Removed  Devitalized   Procedure Instrument  Curette   Procedure Pain Scale Numeric 0/10   Debridement Procedure Performed by Dr Casandra Hagen   Date First Assessed/Time First Assessed: 06/21/19 0857   Present on Hospital Admission: Yes  Primary Wound Type: Diabetic  Location: Foot  Wound Location Orientation: Left;Plantar   Wound Procedure Type   (debridement)   Procedure Time Out 1517   Consent Obtained  Yes   Procedure Bleeding Minimal   Procedure Hemostasis  Pressure   Type of Tissue Removed  Devitalized   Procedure Instrument  Blade   Procedure Pain Scale Numeric 0/10   Debridement Procedure Performed by Dr Ryder Zhou   Procedure Tolerated Well

## 2019-12-31 NOTE — WOUND CARE
12/31/19 1512   Wound Heel Left;Proximal 12/17/19   Date First Assessed/Time First Assessed: 12/17/19 1530   Present on Hospital Admission: Yes  Wound Approximate Age at First Assessment (Weeks): 1 weeks  Primary Wound Type: Diabetic Ulcer  Location: Heel  Wound Location Orientation: Left;Proximal  Evgeny. .. Wound Procedure Type   (debridement)   Procedure Time Out 1513   Consent Obtained  Yes   Procedure Bleeding Minimal   Procedure Hemostasis  Pressure   Type of Tissue Removed  Devitalized   Procedure Instrument  Blade   Procedure Pain Scale Numeric 0/10   Debridement Procedure Performed by Dr Adelia Nguyen Right;Plantar;Lateral 07/30/19   Date First Assessed/Time First Assessed: 07/30/19 1034   Present on Hospital Admission: Yes  Wound Approximate Age at First Assessment (Weeks): 1 weeks  Primary Wound Type: Diabetic Ulcer  Location: Foot  Wound Location Orientation: Right;Plantar;Late. ..    Dressing Type Applied Collagens/cell matrix;Gauze;Gauze wrap (arnie)  (HFBR,)   Wound Procedure Type   (debridment)   Procedure Time Out 1515   Consent Obtained  Yes   Procedure Bleeding Minimal   Procedure Hemostasis  Pressure   Type of Tissue Removed  Devitalized   Procedure Instrument  Curette   Procedure Pain Scale Numeric 0/10   Debridement Procedure Performed by Dr Feli Light   Date First Assessed/Time First Assessed: 06/21/19 0857   Present on Hospital Admission: Yes  Primary Wound Type: Diabetic  Location: Foot  Wound Location Orientation: Left;Plantar   Dressing Type Applied   (TCC, aquacel)   Wound Procedure Type   (debridement)   Procedure Time Out 1517   Consent Obtained  Yes   Procedure Bleeding Minimal   Procedure Hemostasis  Pressure   Type of Tissue Removed  Devitalized   Procedure Instrument  Blade   Procedure Pain Scale Numeric 0/10   Debridement Procedure Performed by Dr La Barrett   Procedure Tolerated Well       Discharge Condition: Stable     Pain: 0    Ambulatory Status: Walking    Discharge Destination: Home     Transportation: Car    Accompanied by: Self     Discharge instructions reviewed with Patient and copy or written instructions have been provided. All questions/concerns have been addressed at this time.

## 2019-12-31 NOTE — DISCHARGE INSTRUCTIONS
Discharge Instructions for  [address] [facility]  Telephone: [phone]    NAME:  Madalynn Oppenheim OF BIRTH:  1960  MEDICAL RECORD NUMBER:  [id number]  DATE:  12/31/2019    Wound Cleansing:   Do not scrub or use excessive force. Cleanse wound prior to applying a clean dressing with:  [] Normal Saline [] Keep Wound Dry in Shower    [] Wound Cleanser   [] Cleanse wound with Mild Soap & Water  [] May Shower at Discharge   [] Other:      Topical Treatments:  Do not apply lotions, creams, or ointments to wound bed unless directed. [] Apply moisturizing lotion to skin surrounding the wound prior to dressing change.  [] Apply antifungal ointment to skin surrounding the wound prior to dressing change.  [] Apply thin film of moisture barrier ointment to skin immediately around wound. [] Other:       Dressings:           Wound Location ***   [] Apply Primary Dressing:       [] MediHoney Gel [] Alginate with Silver [] Alginate   [] Collagen [] Collagen with Silver   [] Santyl with Moisten saline gauze     [] Hydrocolloid   [] MediHoney Alginate [] Foam with Silver   [] Foam   [] Hydrofera Blue    [] Mepilex Border    [] Moisten with Saline [] Hydrogel [] Mepitel  [] Betadine moist gauze   [] Bactroban/Mupirocin [] Polysporin  [] Other:    [] Pack wound loosely with  [] Iodoform   [] Plain Packing  [] Other   [] Cover and Secure with:     [] Gauze [] Tejinder [] Kerlix   [] Ace Wrap [] Cover Roll Tape [] ABD [] Exudry    [] Other:    Avoid contact of tape with skin.   [] Change dressing: [] Daily    [] Every Other Day [] Three times per week   [] Once a week [] Do Not Change Dressing   [] Other:     Edema Control:  Apply: [] Compression Stocking []Right Leg []Left Leg   [] Tubigrip []Right Leg Double Layer []Left Leg Double Layer       []Right Leg Single Layer []Left Leg Single Layer   [] SpandaGrip []Right Leg  []Left Leg      []Low compression 5-10 mm/Hg      []Medium compression 10-20 mm/Hg     []High compression  20-30 mm/Hg   every morning immediately when getting up should be applied to affected leg(s) from mid foot to knee making sure to cover the heel. Remove every night before going to bed. [] Elevate leg(s) above the level of the heart when sitting. [] Avoid prolonged standing in one place. [] Elevate arm/hand above the level of the heart []RightArm []LeftArm    Off-Loading:   [] Off-loading when [] walking  [] in bed [] sitting  [] Total non-weight bearing  [] Right Leg  [] Left Leg   [] Assistive Device [] Walker [] Cane  [] Wheelchair  [] Crutches   [] Surgical shoe    [] Podus Boot(s)   [] Foam Boot(s)  [] Roll About    [] Cast Boot [] CROW Boot  [] Other:    Contact Cast:  Apply: [] Total Contact Cast Applied in Clinic []RightLeg []Left Leg   [] Do not get cast wet. Contact center or go to emergency room if there is a foul odor or becomes uncomfortable due to feeling tight or swelling. Do not use objects inside of cast to scratch. Dietary:  [] Diet as tolerated: [] Diabetic Diet: Low carb no sugar [] No Added Salt:  [] Increase Protein: [] Other:   Activity:  [] Activity as tolerated:  [] Patient has no activity restrictions     [] Strict Bedrest: [] Remain off Work:     [] May return to full duty work:                                   [] Return to work with restrictions:             Return Appointment:  [] Wound and dressing supply provider:   [] ECF or Home Healthcare:  [] Wound Assessment: [] Physician or NP scheduled for Wound Assessment:   [] Return Appointment: With ***  in  *** Week(s)  [] Ordered tests:      Electronically signed Alvina Alcocer RN on 12/31/2019 at 3:10 PM     [facility] Information: Should you experience any significant changes in your wound(s) or have questions about your wound care, please contact the [address] at [address] [place] [address] 8:00 am - 4:30.   If you need help with your wound outside these hours and cannot wait until we are again available, contact your PCP or go to the hospital emergency room. PLEASE NOTE: IF YOU ARE UNABLE TO OBTAIN WOUND SUPPLIES, CONTINUE TO USE THE SUPPLIES YOU HAVE AVAILABLE UNTIL YOU ARE ABLE TO REACH US. IT IS MOST IMPORTANT TO KEEP THE WOUND COVERED AT ALL TIMES.      Physician Signature:_______________________    Date: ___________ Time:  ____________

## 2020-01-01 ENCOUNTER — VIRTUAL VISIT (OUTPATIENT)
Dept: INTERNAL MEDICINE CLINIC | Facility: CLINIC | Age: 60
End: 2020-01-01

## 2020-01-01 ENCOUNTER — PATIENT OUTREACH (OUTPATIENT)
Dept: CASE MANAGEMENT | Age: 60
End: 2020-01-01

## 2020-01-01 ENCOUNTER — TELEPHONE (OUTPATIENT)
Dept: FAMILY MEDICINE CLINIC | Age: 60
End: 2020-01-01

## 2020-01-01 ENCOUNTER — VIRTUAL VISIT (OUTPATIENT)
Dept: INTERNAL MEDICINE CLINIC | Age: 60
End: 2020-01-01
Payer: COMMERCIAL

## 2020-01-01 ENCOUNTER — TELEPHONE (OUTPATIENT)
Dept: CARDIOLOGY CLINIC | Age: 60
End: 2020-01-01

## 2020-01-01 ENCOUNTER — OFFICE VISIT (OUTPATIENT)
Dept: CARDIOLOGY CLINIC | Age: 60
End: 2020-01-01
Payer: COMMERCIAL

## 2020-01-01 ENCOUNTER — ANESTHESIA EVENT (OUTPATIENT)
Dept: SURGERY | Age: 60
DRG: 854 | End: 2020-01-01
Payer: COMMERCIAL

## 2020-01-01 ENCOUNTER — ANESTHESIA (OUTPATIENT)
Dept: SURGERY | Age: 60
DRG: 854 | End: 2020-01-01
Payer: COMMERCIAL

## 2020-01-01 ENCOUNTER — APPOINTMENT (OUTPATIENT)
Dept: GENERAL RADIOLOGY | Age: 60
End: 2020-01-01
Attending: EMERGENCY MEDICINE
Payer: COMMERCIAL

## 2020-01-01 ENCOUNTER — HOSPITAL ENCOUNTER (OUTPATIENT)
Dept: PREADMISSION TESTING | Age: 60
Discharge: HOME | End: 2020-11-13
Payer: COMMERCIAL

## 2020-01-01 ENCOUNTER — HOSPITAL ENCOUNTER (OUTPATIENT)
Dept: INTERVENTIONAL RADIOLOGY/VASCULAR | Age: 60
Discharge: HOME | DRG: 854 | End: 2020-05-04
Attending: NURSE PRACTITIONER
Payer: COMMERCIAL

## 2020-01-01 ENCOUNTER — ANESTHESIA (OUTPATIENT)
Dept: CARDIAC CATH/INVASIVE PROCEDURES | Age: 60
End: 2020-01-01
Payer: COMMERCIAL

## 2020-01-01 ENCOUNTER — TELEPHONE (OUTPATIENT)
Dept: INTERNAL MEDICINE CLINIC | Facility: CLINIC | Age: 60
End: 2020-01-01

## 2020-01-01 ENCOUNTER — ANESTHESIA (OUTPATIENT)
Dept: SURGERY | Age: 60
DRG: 982 | End: 2020-01-01
Payer: COMMERCIAL

## 2020-01-01 ENCOUNTER — OFFICE VISIT (OUTPATIENT)
Dept: INTERNAL MEDICINE CLINIC | Age: 60
End: 2020-01-01
Payer: COMMERCIAL

## 2020-01-01 ENCOUNTER — HOSPITAL ENCOUNTER (EMERGENCY)
Age: 60
Discharge: HOME | End: 2020-11-05
Attending: EMERGENCY MEDICINE
Payer: COMMERCIAL

## 2020-01-01 ENCOUNTER — APPOINTMENT (OUTPATIENT)
Dept: INTERVENTIONAL RADIOLOGY/VASCULAR | Age: 60
DRG: 854 | End: 2020-01-01
Attending: HOSPITALIST
Payer: COMMERCIAL

## 2020-01-01 ENCOUNTER — ANESTHESIA (OUTPATIENT)
Dept: ENDOSCOPY | Age: 60
DRG: 982 | End: 2020-01-01
Payer: COMMERCIAL

## 2020-01-01 ENCOUNTER — HOSPITAL ENCOUNTER (OUTPATIENT)
Dept: INTERVENTIONAL RADIOLOGY/VASCULAR | Age: 60
Discharge: HOME | End: 2020-09-04
Attending: HOSPITALIST

## 2020-01-01 ENCOUNTER — APPOINTMENT (OUTPATIENT)
Dept: NON INVASIVE DIAGNOSTICS | Age: 60
DRG: 854 | End: 2020-01-01
Attending: INTERNAL MEDICINE
Payer: COMMERCIAL

## 2020-01-01 ENCOUNTER — HOSPITAL ENCOUNTER (EMERGENCY)
Age: 60
Discharge: ANOTHER HOSPITAL | End: 2020-08-20
Attending: EMERGENCY MEDICINE
Payer: COMMERCIAL

## 2020-01-01 ENCOUNTER — APPOINTMENT (OUTPATIENT)
Dept: CT IMAGING | Age: 60
DRG: 854 | End: 2020-01-01
Attending: NURSE PRACTITIONER
Payer: COMMERCIAL

## 2020-01-01 ENCOUNTER — HOME HEALTH ADMISSION (OUTPATIENT)
Dept: HOME HEALTH SERVICES | Facility: HOME HEALTH | Age: 60
End: 2020-01-01

## 2020-01-01 ENCOUNTER — ANESTHESIA EVENT (OUTPATIENT)
Dept: ENDOSCOPY | Age: 60
DRG: 982 | End: 2020-01-01
Payer: COMMERCIAL

## 2020-01-01 ENCOUNTER — APPOINTMENT (OUTPATIENT)
Dept: GENERAL RADIOLOGY | Age: 60
DRG: 982 | End: 2020-01-01
Attending: INTERNAL MEDICINE
Payer: COMMERCIAL

## 2020-01-01 ENCOUNTER — HOSPITAL ENCOUNTER (INPATIENT)
Age: 60
LOS: 14 days | Discharge: HOME | DRG: 982 | End: 2020-12-07
Attending: EMERGENCY MEDICINE | Admitting: FAMILY MEDICINE
Payer: COMMERCIAL

## 2020-01-01 ENCOUNTER — TELEPHONE (OUTPATIENT)
Dept: INTERNAL MEDICINE CLINIC | Age: 60
End: 2020-01-01

## 2020-01-01 ENCOUNTER — APPOINTMENT (OUTPATIENT)
Dept: ULTRASOUND IMAGING | Age: 60
DRG: 291 | End: 2020-01-01
Attending: INTERNAL MEDICINE
Payer: COMMERCIAL

## 2020-01-01 ENCOUNTER — APPOINTMENT (OUTPATIENT)
Dept: GENERAL RADIOLOGY | Age: 60
DRG: 291 | End: 2020-01-01
Attending: EMERGENCY MEDICINE
Payer: COMMERCIAL

## 2020-01-01 ENCOUNTER — ANESTHESIA (OUTPATIENT)
Dept: CARDIAC CATH/INVASIVE PROCEDURES | Age: 60
DRG: 854 | End: 2020-01-01
Payer: COMMERCIAL

## 2020-01-01 ENCOUNTER — ANESTHESIA EVENT (OUTPATIENT)
Dept: CARDIAC CATH/INVASIVE PROCEDURES | Age: 60
DRG: 854 | End: 2020-01-01
Payer: COMMERCIAL

## 2020-01-01 ENCOUNTER — APPOINTMENT (OUTPATIENT)
Dept: GENERAL RADIOLOGY | Age: 60
DRG: 982 | End: 2020-01-01
Attending: EMERGENCY MEDICINE
Payer: COMMERCIAL

## 2020-01-01 ENCOUNTER — PATIENT MESSAGE (OUTPATIENT)
Dept: CASE MANAGEMENT | Age: 60
End: 2020-01-01

## 2020-01-01 ENCOUNTER — TELEPHONE (OUTPATIENT)
Dept: CASE MANAGEMENT | Age: 60
End: 2020-01-01

## 2020-01-01 ENCOUNTER — ANESTHESIA EVENT (OUTPATIENT)
Dept: CARDIAC CATH/INVASIVE PROCEDURES | Age: 60
End: 2020-01-01
Payer: COMMERCIAL

## 2020-01-01 ENCOUNTER — HOSPITAL ENCOUNTER (OUTPATIENT)
Dept: INTERVENTIONAL RADIOLOGY/VASCULAR | Age: 60
Discharge: HOME | End: 2020-10-19
Attending: INTERNAL MEDICINE | Admitting: INTERNAL MEDICINE
Payer: COMMERCIAL

## 2020-01-01 ENCOUNTER — HOSPITAL ENCOUNTER (OUTPATIENT)
Age: 60
Setting detail: OBSERVATION
Discharge: HOME | End: 2020-11-18
Attending: INTERNAL MEDICINE | Admitting: INTERNAL MEDICINE
Payer: COMMERCIAL

## 2020-01-01 ENCOUNTER — HOSPITAL ENCOUNTER (OUTPATIENT)
Dept: REHABILITATION | Age: 60
End: 2020-05-28
Attending: INTERNAL MEDICINE | Admitting: PHYSICAL MEDICINE & REHABILITATION

## 2020-01-01 ENCOUNTER — APPOINTMENT (OUTPATIENT)
Dept: CT IMAGING | Age: 60
DRG: 291 | End: 2020-01-01
Attending: EMERGENCY MEDICINE
Payer: COMMERCIAL

## 2020-01-01 ENCOUNTER — HOSPITAL ENCOUNTER (INPATIENT)
Age: 60
LOS: 4 days | Discharge: HOME | DRG: 291 | End: 2020-12-24
Attending: EMERGENCY MEDICINE | Admitting: HOSPITALIST
Payer: COMMERCIAL

## 2020-01-01 ENCOUNTER — APPOINTMENT (OUTPATIENT)
Dept: NON INVASIVE DIAGNOSTICS | Age: 60
DRG: 982 | End: 2020-01-01
Attending: THORACIC SURGERY (CARDIOTHORACIC VASCULAR SURGERY)
Payer: COMMERCIAL

## 2020-01-01 ENCOUNTER — APPOINTMENT (OUTPATIENT)
Dept: CT IMAGING | Age: 60
End: 2020-01-01
Attending: EMERGENCY MEDICINE
Payer: COMMERCIAL

## 2020-01-01 ENCOUNTER — APPOINTMENT (OUTPATIENT)
Dept: NUCLEAR MEDICINE | Age: 60
End: 2020-01-01
Attending: EMERGENCY MEDICINE
Payer: COMMERCIAL

## 2020-01-01 ENCOUNTER — APPOINTMENT (OUTPATIENT)
Dept: GENERAL RADIOLOGY | Age: 60
DRG: 854 | End: 2020-01-01
Attending: INTERNAL MEDICINE
Payer: COMMERCIAL

## 2020-01-01 ENCOUNTER — ANESTHESIA EVENT (OUTPATIENT)
Dept: SURGERY | Age: 60
DRG: 982 | End: 2020-01-01
Payer: COMMERCIAL

## 2020-01-01 ENCOUNTER — HOSPITAL ENCOUNTER (EMERGENCY)
Age: 60
Discharge: HOME | End: 2020-09-26
Attending: EMERGENCY MEDICINE | Admitting: EMERGENCY MEDICINE
Payer: COMMERCIAL

## 2020-01-01 ENCOUNTER — HOSPITAL ENCOUNTER (OUTPATIENT)
Dept: INTERVENTIONAL RADIOLOGY/VASCULAR | Age: 60
Discharge: HOME | End: 2020-06-26
Attending: INTERNAL MEDICINE
Payer: COMMERCIAL

## 2020-01-01 ENCOUNTER — APPOINTMENT (OUTPATIENT)
Dept: CT IMAGING | Age: 60
DRG: 982 | End: 2020-01-01
Attending: EMERGENCY MEDICINE
Payer: COMMERCIAL

## 2020-01-01 ENCOUNTER — HOSPITAL ENCOUNTER (INPATIENT)
Age: 60
LOS: 22 days | Discharge: NURSING, REHAB OR LONG-TERM CARE | DRG: 854 | End: 2020-09-11
Attending: THORACIC SURGERY (CARDIOTHORACIC VASCULAR SURGERY) | Admitting: INTERNAL MEDICINE
Payer: COMMERCIAL

## 2020-01-01 ENCOUNTER — OFFICE VISIT (OUTPATIENT)
Dept: SURGERY | Age: 60
End: 2020-01-01
Payer: COMMERCIAL

## 2020-01-01 ENCOUNTER — APPOINTMENT (OUTPATIENT)
Dept: VASCULAR SURGERY | Age: 60
DRG: 982 | End: 2020-01-01
Attending: EMERGENCY MEDICINE
Payer: COMMERCIAL

## 2020-01-01 VITALS
OXYGEN SATURATION: 96 % | TEMPERATURE: 98.2 F | HEART RATE: 90 BPM | BODY MASS INDEX: 35.35 KG/M2 | WEIGHT: 246.91 LBS | SYSTOLIC BLOOD PRESSURE: 110 MMHG | HEIGHT: 70 IN | RESPIRATION RATE: 16 BRPM | DIASTOLIC BLOOD PRESSURE: 55 MMHG

## 2020-01-01 VITALS
WEIGHT: 242 LBS | HEIGHT: 70 IN | SYSTOLIC BLOOD PRESSURE: 133 MMHG | HEART RATE: 94 BPM | DIASTOLIC BLOOD PRESSURE: 71 MMHG | TEMPERATURE: 98.2 F | OXYGEN SATURATION: 100 % | RESPIRATION RATE: 16 BRPM | BODY MASS INDEX: 34.65 KG/M2

## 2020-01-01 VITALS
SYSTOLIC BLOOD PRESSURE: 157 MMHG | WEIGHT: 224 LBS | HEIGHT: 70 IN | OXYGEN SATURATION: 98 % | BODY MASS INDEX: 32.07 KG/M2 | RESPIRATION RATE: 20 BRPM | HEART RATE: 104 BPM | DIASTOLIC BLOOD PRESSURE: 77 MMHG | TEMPERATURE: 97.9 F

## 2020-01-01 VITALS
WEIGHT: 224 LBS | OXYGEN SATURATION: 97 % | TEMPERATURE: 98.1 F | HEIGHT: 70 IN | SYSTOLIC BLOOD PRESSURE: 167 MMHG | BODY MASS INDEX: 32.07 KG/M2 | RESPIRATION RATE: 13 BRPM | DIASTOLIC BLOOD PRESSURE: 84 MMHG | HEART RATE: 112 BPM

## 2020-01-01 VITALS
SYSTOLIC BLOOD PRESSURE: 140 MMHG | RESPIRATION RATE: 24 BRPM | WEIGHT: 256 LBS | OXYGEN SATURATION: 97 % | HEIGHT: 70 IN | BODY MASS INDEX: 36.65 KG/M2 | DIASTOLIC BLOOD PRESSURE: 82 MMHG | HEART RATE: 100 BPM

## 2020-01-01 VITALS
HEIGHT: 70 IN | HEART RATE: 87 BPM | WEIGHT: 243 LBS | DIASTOLIC BLOOD PRESSURE: 63 MMHG | OXYGEN SATURATION: 99 % | RESPIRATION RATE: 16 BRPM | SYSTOLIC BLOOD PRESSURE: 143 MMHG | TEMPERATURE: 97.8 F | BODY MASS INDEX: 34.79 KG/M2

## 2020-01-01 VITALS
OXYGEN SATURATION: 95 % | WEIGHT: 244 LBS | DIASTOLIC BLOOD PRESSURE: 56 MMHG | RESPIRATION RATE: 18 BRPM | HEIGHT: 70 IN | TEMPERATURE: 98.2 F | SYSTOLIC BLOOD PRESSURE: 124 MMHG | HEART RATE: 91 BPM | BODY MASS INDEX: 34.93 KG/M2

## 2020-01-01 VITALS
SYSTOLIC BLOOD PRESSURE: 169 MMHG | HEIGHT: 70 IN | TEMPERATURE: 98.1 F | OXYGEN SATURATION: 98 % | HEART RATE: 95 BPM | WEIGHT: 268.08 LBS | BODY MASS INDEX: 38.38 KG/M2 | DIASTOLIC BLOOD PRESSURE: 91 MMHG | RESPIRATION RATE: 18 BRPM

## 2020-01-01 VITALS
HEIGHT: 70 IN | WEIGHT: 262 LBS | TEMPERATURE: 97.9 F | RESPIRATION RATE: 20 BRPM | DIASTOLIC BLOOD PRESSURE: 97 MMHG | BODY MASS INDEX: 37.51 KG/M2 | SYSTOLIC BLOOD PRESSURE: 164 MMHG | HEART RATE: 90 BPM

## 2020-01-01 VITALS
HEIGHT: 70 IN | RESPIRATION RATE: 14 BRPM | TEMPERATURE: 98.1 F | BODY MASS INDEX: 38.37 KG/M2 | OXYGEN SATURATION: 99 % | DIASTOLIC BLOOD PRESSURE: 93 MMHG | WEIGHT: 268 LBS | HEART RATE: 114 BPM | SYSTOLIC BLOOD PRESSURE: 146 MMHG

## 2020-01-01 VITALS
HEIGHT: 70 IN | OXYGEN SATURATION: 98 % | RESPIRATION RATE: 20 BRPM | DIASTOLIC BLOOD PRESSURE: 72 MMHG | BODY MASS INDEX: 34.67 KG/M2 | HEART RATE: 93 BPM | SYSTOLIC BLOOD PRESSURE: 150 MMHG | WEIGHT: 242.2 LBS

## 2020-01-01 VITALS
WEIGHT: 229.2 LBS | BODY MASS INDEX: 32.81 KG/M2 | OXYGEN SATURATION: 98 % | HEIGHT: 70 IN | DIASTOLIC BLOOD PRESSURE: 69 MMHG | SYSTOLIC BLOOD PRESSURE: 113 MMHG | RESPIRATION RATE: 16 BRPM | TEMPERATURE: 97.9 F | HEART RATE: 90 BPM

## 2020-01-01 VITALS
BODY MASS INDEX: 32.04 KG/M2 | TEMPERATURE: 98.6 F | WEIGHT: 223.77 LBS | OXYGEN SATURATION: 98 % | HEART RATE: 78 BPM | SYSTOLIC BLOOD PRESSURE: 154 MMHG | DIASTOLIC BLOOD PRESSURE: 70 MMHG | HEIGHT: 70 IN | RESPIRATION RATE: 16 BRPM

## 2020-01-01 VITALS
SYSTOLIC BLOOD PRESSURE: 138 MMHG | BODY MASS INDEX: 35.93 KG/M2 | HEART RATE: 95 BPM | DIASTOLIC BLOOD PRESSURE: 82 MMHG | RESPIRATION RATE: 18 BRPM | TEMPERATURE: 97.7 F | HEIGHT: 70 IN | WEIGHT: 251 LBS | OXYGEN SATURATION: 98 %

## 2020-01-01 VITALS
WEIGHT: 244.27 LBS | DIASTOLIC BLOOD PRESSURE: 48 MMHG | OXYGEN SATURATION: 97 % | SYSTOLIC BLOOD PRESSURE: 131 MMHG | TEMPERATURE: 97.7 F | BODY MASS INDEX: 34.97 KG/M2 | HEART RATE: 91 BPM | RESPIRATION RATE: 16 BRPM | HEIGHT: 70 IN

## 2020-01-01 DIAGNOSIS — I10 ESSENTIAL HYPERTENSION: ICD-10-CM

## 2020-01-01 DIAGNOSIS — Z76.89 ENCOUNTER TO ESTABLISH CARE: Primary | ICD-10-CM

## 2020-01-01 DIAGNOSIS — N18.4 ANEMIA IN STAGE 4 CHRONIC KIDNEY DISEASE (HCC): ICD-10-CM

## 2020-01-01 DIAGNOSIS — E78.2 MIXED HYPERLIPIDEMIA: ICD-10-CM

## 2020-01-01 DIAGNOSIS — N30.00 ACUTE CYSTITIS WITHOUT HEMATURIA: Primary | ICD-10-CM

## 2020-01-01 DIAGNOSIS — I48.0 PAF (PAROXYSMAL ATRIAL FIBRILLATION) (HCC): ICD-10-CM

## 2020-01-01 DIAGNOSIS — R06.02 SOB (SHORTNESS OF BREATH): Primary | ICD-10-CM

## 2020-01-01 DIAGNOSIS — I49.5 SICK SINUS SYNDROME (HCC): Chronic | ICD-10-CM

## 2020-01-01 DIAGNOSIS — N18.4 CONTROLLED TYPE 2 DIABETES MELLITUS WITH STAGE 4 CHRONIC KIDNEY DISEASE, WITH LONG-TERM CURRENT USE OF INSULIN (HCC): Primary | ICD-10-CM

## 2020-01-01 DIAGNOSIS — J18.9 COMMUNITY ACQUIRED PNEUMONIA, UNSPECIFIED LATERALITY: Primary | ICD-10-CM

## 2020-01-01 DIAGNOSIS — N18.4 CONTROLLED TYPE 2 DIABETES MELLITUS WITH STAGE 4 CHRONIC KIDNEY DISEASE, WITH LONG-TERM CURRENT USE OF INSULIN (HCC): ICD-10-CM

## 2020-01-01 DIAGNOSIS — Z79.01 LONG TERM (CURRENT) USE OF ANTICOAGULANTS: Primary | ICD-10-CM

## 2020-01-01 DIAGNOSIS — E87.6 HYPOKALEMIA: ICD-10-CM

## 2020-01-01 DIAGNOSIS — Z89.512 LEFT BELOW-KNEE AMPUTEE (HCC): ICD-10-CM

## 2020-01-01 DIAGNOSIS — E11.42 DIABETIC POLYNEUROPATHY ASSOCIATED WITH TYPE 2 DIABETES MELLITUS (HCC): ICD-10-CM

## 2020-01-01 DIAGNOSIS — D63.1 ANEMIA IN STAGE 4 CHRONIC KIDNEY DISEASE (HCC): ICD-10-CM

## 2020-01-01 DIAGNOSIS — R06.00 DYSPNEA, UNSPECIFIED TYPE: ICD-10-CM

## 2020-01-01 DIAGNOSIS — D64.9 ANEMIA, UNSPECIFIED TYPE: ICD-10-CM

## 2020-01-01 DIAGNOSIS — Z79.4 CONTROLLED TYPE 2 DIABETES MELLITUS WITH STAGE 4 CHRONIC KIDNEY DISEASE, WITH LONG-TERM CURRENT USE OF INSULIN (HCC): Primary | ICD-10-CM

## 2020-01-01 DIAGNOSIS — M86.20 SUBACUTE OSTEOMYELITIS, UNSPECIFIED SITE (HCC): ICD-10-CM

## 2020-01-01 DIAGNOSIS — Z95.0 S/P CARDIAC PACEMAKER PROCEDURE: ICD-10-CM

## 2020-01-01 DIAGNOSIS — Z79.4 CONTROLLED TYPE 2 DIABETES MELLITUS WITH STAGE 4 CHRONIC KIDNEY DISEASE, WITH LONG-TERM CURRENT USE OF INSULIN (HCC): ICD-10-CM

## 2020-01-01 DIAGNOSIS — Z86.79 STATUS POST ABLATION OF ATRIAL FIBRILLATION: Chronic | ICD-10-CM

## 2020-01-01 DIAGNOSIS — R18.8 PELVIC FLUID COLLECTION: ICD-10-CM

## 2020-01-01 DIAGNOSIS — J96.01 ACUTE RESPIRATORY FAILURE WITH HYPOXIA (HCC): ICD-10-CM

## 2020-01-01 DIAGNOSIS — N18.4 CKD (CHRONIC KIDNEY DISEASE), STAGE IV (HCC): ICD-10-CM

## 2020-01-01 DIAGNOSIS — Z89.519: ICD-10-CM

## 2020-01-01 DIAGNOSIS — E11.22 CONTROLLED TYPE 2 DIABETES MELLITUS WITH STAGE 4 CHRONIC KIDNEY DISEASE, WITH LONG-TERM CURRENT USE OF INSULIN (HCC): ICD-10-CM

## 2020-01-01 DIAGNOSIS — M25.9 DISORDER OF STERNOCLAVICULAR JOINT: ICD-10-CM

## 2020-01-01 DIAGNOSIS — I50.9 CONGESTIVE HEART FAILURE, UNSPECIFIED HF CHRONICITY, UNSPECIFIED HEART FAILURE TYPE (HCC): ICD-10-CM

## 2020-01-01 DIAGNOSIS — E66.01 MORBID OBESITY WITH BMI OF 40.0-44.9, ADULT (HCC): ICD-10-CM

## 2020-01-01 DIAGNOSIS — R10.2 PELVIC PAIN: Primary | ICD-10-CM

## 2020-01-01 DIAGNOSIS — R60.1 GENERALIZED EDEMA: ICD-10-CM

## 2020-01-01 DIAGNOSIS — I30.9 ACUTE PERICARDITIS, UNSPECIFIED TYPE: ICD-10-CM

## 2020-01-01 DIAGNOSIS — R06.02 SOB (SHORTNESS OF BREATH): ICD-10-CM

## 2020-01-01 DIAGNOSIS — R10.31 ABDOMINAL PAIN, RIGHT LOWER QUADRANT: ICD-10-CM

## 2020-01-01 DIAGNOSIS — L97.412 DIABETIC ULCER OF RIGHT MIDFOOT ASSOCIATED WITH TYPE 2 DIABETES MELLITUS, WITH FAT LAYER EXPOSED (HCC): ICD-10-CM

## 2020-01-01 DIAGNOSIS — I50.22 CHRONIC SYSTOLIC CONGESTIVE HEART FAILURE (HCC): ICD-10-CM

## 2020-01-01 DIAGNOSIS — R78.81 BACTEREMIA: Primary | ICD-10-CM

## 2020-01-01 DIAGNOSIS — J90 PLEURAL EFFUSION, BILATERAL: ICD-10-CM

## 2020-01-01 DIAGNOSIS — R11.2 INTRACTABLE VOMITING WITH NAUSEA, UNSPECIFIED VOMITING TYPE: ICD-10-CM

## 2020-01-01 DIAGNOSIS — R78.81 BACTEREMIA DUE TO STAPHYLOCOCCUS AUREUS: Primary | ICD-10-CM

## 2020-01-01 DIAGNOSIS — Z89.511 HX OF RIGHT BKA (HCC): ICD-10-CM

## 2020-01-01 DIAGNOSIS — Z79.01 ANTICOAGULATED: ICD-10-CM

## 2020-01-01 DIAGNOSIS — A49.01 MSSA (METHICILLIN SUSCEPTIBLE STAPHYLOCOCCUS AUREUS) INFECTION: ICD-10-CM

## 2020-01-01 DIAGNOSIS — N17.9 AKI (ACUTE KIDNEY INJURY) (HCC): ICD-10-CM

## 2020-01-01 DIAGNOSIS — Z98.890 STATUS POST ABLATION OF ATRIAL FIBRILLATION: Chronic | ICD-10-CM

## 2020-01-01 DIAGNOSIS — I32 PERICARDITIS IN DISEASES CLASSIFIED ELSEWHERE: ICD-10-CM

## 2020-01-01 DIAGNOSIS — A41.9 SEPSIS, DUE TO UNSPECIFIED ORGANISM, UNSPECIFIED WHETHER ACUTE ORGAN DYSFUNCTION PRESENT (HCC): ICD-10-CM

## 2020-01-01 DIAGNOSIS — E11.3522: ICD-10-CM

## 2020-01-01 DIAGNOSIS — R06.02 SHORTNESS OF BREATH: ICD-10-CM

## 2020-01-01 DIAGNOSIS — M00.9 SEPTIC ARTHRITIS, DUE TO UNSPECIFIED ORGANISM, SEPTIC ARTHRITIS OF UNSPECIFIED LOCATION (HCC): ICD-10-CM

## 2020-01-01 DIAGNOSIS — M86.9 OSTEOMYELITIS, UNSPECIFIED SITE, UNSPECIFIED TYPE (HCC): ICD-10-CM

## 2020-01-01 DIAGNOSIS — I49.5 SICK SINUS SYNDROME (HCC): ICD-10-CM

## 2020-01-01 DIAGNOSIS — I50.31 ACUTE DIASTOLIC CHF (CONGESTIVE HEART FAILURE) (HCC): ICD-10-CM

## 2020-01-01 DIAGNOSIS — Z95.0 PACEMAKER: ICD-10-CM

## 2020-01-01 DIAGNOSIS — R07.9 CHEST PAIN, UNSPECIFIED TYPE: Primary | ICD-10-CM

## 2020-01-01 DIAGNOSIS — Z01.812 PRE-PROCEDURE LAB EXAM: ICD-10-CM

## 2020-01-01 DIAGNOSIS — T82.9XXA COMPLICATION OF INTRAVENOUS CATHETER SITE: ICD-10-CM

## 2020-01-01 DIAGNOSIS — N18.30 STAGE 3 CHRONIC KIDNEY DISEASE, UNSPECIFIED WHETHER STAGE 3A OR 3B CKD (HCC): ICD-10-CM

## 2020-01-01 DIAGNOSIS — N30.90 CYSTITIS: ICD-10-CM

## 2020-01-01 DIAGNOSIS — I49.5 SICK SINUS SYNDROME (HCC): Primary | ICD-10-CM

## 2020-01-01 DIAGNOSIS — J18.9 PNEUMONIA DUE TO INFECTIOUS ORGANISM, UNSPECIFIED LATERALITY, UNSPECIFIED PART OF LUNG: ICD-10-CM

## 2020-01-01 DIAGNOSIS — Z79.01 LONG TERM (CURRENT) USE OF ANTICOAGULANTS: ICD-10-CM

## 2020-01-01 DIAGNOSIS — I48.91 ATRIAL FIBRILLATION WITH RAPID VENTRICULAR RESPONSE (HCC): Primary | ICD-10-CM

## 2020-01-01 DIAGNOSIS — M86.10 OTHER ACUTE OSTEOMYELITIS, UNSPECIFIED SITE (HCC): ICD-10-CM

## 2020-01-01 DIAGNOSIS — K21.9 GASTROESOPHAGEAL REFLUX DISEASE WITHOUT ESOPHAGITIS: ICD-10-CM

## 2020-01-01 DIAGNOSIS — E11.621 DIABETIC ULCER OF RIGHT MIDFOOT ASSOCIATED WITH TYPE 2 DIABETES MELLITUS, WITH FAT LAYER EXPOSED (HCC): ICD-10-CM

## 2020-01-01 DIAGNOSIS — N18.9 CHRONIC KIDNEY DISEASE, UNSPECIFIED CKD STAGE: ICD-10-CM

## 2020-01-01 DIAGNOSIS — R63.5 ABNORMAL WEIGHT GAIN: ICD-10-CM

## 2020-01-01 DIAGNOSIS — B37.31 VULVOVAGINITIS DUE TO YEAST: Primary | ICD-10-CM

## 2020-01-01 DIAGNOSIS — R07.9 CHEST PAIN, UNSPECIFIED TYPE: ICD-10-CM

## 2020-01-01 DIAGNOSIS — I33.0 ACUTE BACTERIAL ENDOCARDITIS: ICD-10-CM

## 2020-01-01 DIAGNOSIS — E11.22 CONTROLLED TYPE 2 DIABETES MELLITUS WITH STAGE 4 CHRONIC KIDNEY DISEASE, WITH LONG-TERM CURRENT USE OF INSULIN (HCC): Primary | ICD-10-CM

## 2020-01-01 DIAGNOSIS — Z09 HOSPITAL DISCHARGE FOLLOW-UP: Primary | ICD-10-CM

## 2020-01-01 DIAGNOSIS — I48.91 ATRIAL FIBRILLATION WITH RVR (HCC): Primary | ICD-10-CM

## 2020-01-01 DIAGNOSIS — B95.61 BACTEREMIA DUE TO STAPHYLOCOCCUS AUREUS: ICD-10-CM

## 2020-01-01 DIAGNOSIS — N18.9 CHRONIC RENAL FAILURE, UNSPECIFIED CKD STAGE: ICD-10-CM

## 2020-01-01 DIAGNOSIS — E87.79 OTHER HYPERVOLEMIA: ICD-10-CM

## 2020-01-01 DIAGNOSIS — M00.9 JOINT INFECTION (HCC): Primary | ICD-10-CM

## 2020-01-01 DIAGNOSIS — R10.31 RLQ ABDOMINAL PAIN: ICD-10-CM

## 2020-01-01 DIAGNOSIS — Z12.11 SCREENING FOR COLON CANCER: ICD-10-CM

## 2020-01-01 DIAGNOSIS — R77.8 ELEVATED TROPONIN: ICD-10-CM

## 2020-01-01 DIAGNOSIS — R78.81 BACTEREMIA DUE TO STAPHYLOCOCCUS AUREUS: ICD-10-CM

## 2020-01-01 DIAGNOSIS — Z98.890 S/P ATRIOVENTRICULAR NODAL ABLATION: ICD-10-CM

## 2020-01-01 DIAGNOSIS — R60.1 ANASARCA: ICD-10-CM

## 2020-01-01 DIAGNOSIS — D50.9 IRON DEFICIENCY ANEMIA, UNSPECIFIED IRON DEFICIENCY ANEMIA TYPE: ICD-10-CM

## 2020-01-01 DIAGNOSIS — B95.61 BACTEREMIA DUE TO STAPHYLOCOCCUS AUREUS: Primary | ICD-10-CM

## 2020-01-01 LAB
ABO + RH BLD: NORMAL
ABO + RH BLD: NORMAL
ALBUMIN SERPL-MCNC: 1.9 G/DL (ref 3.5–5)
ALBUMIN SERPL-MCNC: 2 G/DL (ref 3.5–5)
ALBUMIN SERPL-MCNC: 2.1 G/DL (ref 3.5–5)
ALBUMIN SERPL-MCNC: 2.2 G/DL (ref 3.5–5)
ALBUMIN SERPL-MCNC: 2.3 G/DL (ref 3.5–5)
ALBUMIN SERPL-MCNC: 2.3 G/DL (ref 3.5–5)
ALBUMIN SERPL-MCNC: 2.5 G/DL (ref 3.5–5)
ALBUMIN SERPL-MCNC: 2.6 G/DL (ref 3.5–5)
ALBUMIN SERPL-MCNC: 2.6 G/DL (ref 3.5–5)
ALBUMIN SERPL-MCNC: 2.8 G/DL (ref 3.5–5)
ALBUMIN SERPL-MCNC: 2.9 G/DL (ref 3.5–5)
ALBUMIN SERPL-MCNC: 3 G/DL (ref 3.5–5)
ALBUMIN SERPL-MCNC: 3.4 G/DL (ref 3.5–5)
ALBUMIN SERPL-MCNC: 3.4 G/DL (ref 3.5–5)
ALBUMIN SERPL-MCNC: 3.5 G/DL (ref 3.5–5)
ALBUMIN/CREAT UR: 740 MG/G CREAT (ref 0–29)
ALBUMIN/GLOB SERPL: 0.5 {RATIO} (ref 1.1–2.2)
ALBUMIN/GLOB SERPL: 0.7 {RATIO} (ref 1.1–2.2)
ALBUMIN/GLOB SERPL: 0.7 {RATIO} (ref 1.1–2.2)
ALBUMIN/GLOB SERPL: 0.8 {RATIO} (ref 1.1–2.2)
ALBUMIN/GLOB SERPL: 0.8 {RATIO} (ref 1.1–2.2)
ALBUMIN/GLOB SERPL: 0.9 {RATIO} (ref 1.1–2.2)
ALBUMIN/GLOB SERPL: 0.9 {RATIO} (ref 1.1–2.2)
ALP SERPL-CCNC: 110 U/L (ref 45–117)
ALP SERPL-CCNC: 118 U/L (ref 45–117)
ALP SERPL-CCNC: 123 U/L (ref 45–117)
ALP SERPL-CCNC: 127 U/L (ref 45–117)
ALP SERPL-CCNC: 146 U/L (ref 45–117)
ALP SERPL-CCNC: 72 U/L (ref 45–117)
ALP SERPL-CCNC: 80 U/L (ref 45–117)
ALP SERPL-CCNC: 82 U/L (ref 45–117)
ALP SERPL-CCNC: 86 U/L (ref 45–117)
ALP SERPL-CCNC: 87 U/L (ref 45–117)
ALP SERPL-CCNC: 88 U/L (ref 45–117)
ALP SERPL-CCNC: 88 U/L (ref 45–117)
ALP SERPL-CCNC: 89 U/L (ref 45–117)
ALT SERPL-CCNC: 10 U/L (ref 12–78)
ALT SERPL-CCNC: 12 U/L (ref 12–78)
ALT SERPL-CCNC: 12 U/L (ref 12–78)
ALT SERPL-CCNC: 147 U/L (ref 12–78)
ALT SERPL-CCNC: 15 U/L (ref 12–78)
ALT SERPL-CCNC: 156 U/L (ref 12–78)
ALT SERPL-CCNC: 19 U/L (ref 12–78)
ALT SERPL-CCNC: 217 U/L (ref 12–78)
ALT SERPL-CCNC: 225 U/L (ref 12–78)
ALT SERPL-CCNC: 226 U/L (ref 12–78)
ALT SERPL-CCNC: 23 U/L (ref 12–78)
ALT SERPL-CCNC: 27 U/L (ref 12–78)
ALT SERPL-CCNC: 56 U/L (ref 12–78)
ALT SERPL-CCNC: 95 U/L (ref 12–78)
ALT SERPL-CCNC: <6 U/L (ref 12–78)
ANION GAP SERPL CALC-SCNC: 10 MMOL/L (ref 5–15)
ANION GAP SERPL CALC-SCNC: 10 MMOL/L (ref 5–15)
ANION GAP SERPL CALC-SCNC: 4 MMOL/L (ref 5–15)
ANION GAP SERPL CALC-SCNC: 5 MMOL/L (ref 5–15)
ANION GAP SERPL CALC-SCNC: 6 MMOL/L (ref 5–15)
ANION GAP SERPL CALC-SCNC: 7 MMOL/L (ref 5–15)
ANION GAP SERPL CALC-SCNC: 8 MMOL/L (ref 5–15)
ANION GAP SERPL CALC-SCNC: 9 MMOL/L (ref 5–15)
APPEARANCE UR: ABNORMAL
APPEARANCE UR: CLEAR
APTT PPP: 24.4 SEC (ref 22.1–31)
APTT PPP: 24.9 SEC (ref 22.1–32)
APTT PPP: 27.4 SEC (ref 22.1–31)
APTT PPP: 32 SEC (ref 22.1–31)
APTT PPP: 33.6 SEC (ref 22.1–31)
APTT PPP: 33.6 SEC (ref 22.1–32)
APTT PPP: 39.4 SEC (ref 22.1–31)
APTT PPP: 43.5 SEC (ref 22.1–31)
APTT PPP: 49.7 SEC (ref 22.1–32)
APTT PPP: 55.3 SEC (ref 22.1–31)
APTT PPP: 57.9 SEC (ref 22.1–32)
APTT PPP: 63.3 SEC (ref 22.1–32)
APTT PPP: 64.1 SEC (ref 22.1–31)
APTT PPP: 67.5 SEC (ref 22.1–31)
APTT PPP: 73.5 SEC (ref 22.1–31)
APTT PPP: 83.1 SEC (ref 22.1–31)
AST SERPL-CCNC: 11 U/L (ref 15–37)
AST SERPL-CCNC: 12 U/L (ref 15–37)
AST SERPL-CCNC: 126 U/L (ref 15–37)
AST SERPL-CCNC: 130 U/L (ref 15–37)
AST SERPL-CCNC: 16 U/L (ref 15–37)
AST SERPL-CCNC: 17 U/L (ref 15–37)
AST SERPL-CCNC: 20 U/L (ref 15–37)
AST SERPL-CCNC: 25 U/L (ref 15–37)
AST SERPL-CCNC: 33 U/L (ref 15–37)
AST SERPL-CCNC: 34 U/L (ref 15–37)
AST SERPL-CCNC: 445 U/L (ref 15–37)
AST SERPL-CCNC: 466 U/L (ref 15–37)
AST SERPL-CCNC: 620 U/L (ref 15–37)
AST SERPL-CCNC: 649 U/L (ref 15–37)
AST SERPL-CCNC: 803 U/L (ref 15–37)
ATRIAL RATE: 101 BPM
ATRIAL RATE: 102 BPM
ATRIAL RATE: 48 BPM
ATRIAL RATE: 48 BPM
ATRIAL RATE: 82 BPM
ATRIAL RATE: 86 BPM
ATRIAL RATE: 92 BPM
BACTERIA #/AREA URNS HPF: NORMAL /[HPF]
BACTERIA SPEC CULT: ABNORMAL
BACTERIA SPEC CULT: NORMAL
BACTERIA URNS QL MICRO: ABNORMAL /HPF
BACTERIA URNS QL MICRO: NEGATIVE /HPF
BASOPHILS # BLD AUTO: 0.1 X10E3/UL (ref 0–0.2)
BASOPHILS # BLD AUTO: 0.1 X10E3/UL (ref 0–0.2)
BASOPHILS # BLD: 0 K/UL (ref 0–0.1)
BASOPHILS # BLD: 0.1 K/UL (ref 0–0.1)
BASOPHILS NFR BLD AUTO: 1 %
BASOPHILS NFR BLD AUTO: 1 %
BASOPHILS NFR BLD: 0 % (ref 0–1)
BASOPHILS NFR BLD: 0 % (ref 0–1)
BASOPHILS NFR BLD: 1 % (ref 0–1)
BASOPHILS NFR BLD: 2 % (ref 0–1)
BILIRUB SERPL-MCNC: 0.3 MG/DL (ref 0.2–1)
BILIRUB SERPL-MCNC: 0.4 MG/DL (ref 0.2–1)
BILIRUB SERPL-MCNC: 0.5 MG/DL (ref 0.2–1)
BILIRUB SERPL-MCNC: 0.5 MG/DL (ref 0.2–1)
BILIRUB SERPL-MCNC: 0.7 MG/DL (ref 0.2–1)
BILIRUB SERPL-MCNC: 1 MG/DL (ref 0.2–1)
BILIRUB SERPL-MCNC: 1.2 MG/DL (ref 0.2–1)
BILIRUB SERPL-MCNC: 1.3 MG/DL (ref 0.2–1)
BILIRUB UR QL STRIP: NEGATIVE
BILIRUB UR QL STRIP: NEGATIVE
BILIRUB UR QL: NEGATIVE
BLD PROD TYP BPU: NORMAL
BLD PROD TYP BPU: NORMAL
BLOOD GROUP ANTIBODIES SERPL: NORMAL
BLOOD GROUP ANTIBODIES SERPL: NORMAL
BNP SERPL-MCNC: ABNORMAL PG/ML
BPU ID: NORMAL
BPU ID: NORMAL
BUN SERPL-MCNC: 15 MG/DL (ref 6–20)
BUN SERPL-MCNC: 16 MG/DL (ref 6–20)
BUN SERPL-MCNC: 17 MG/DL (ref 6–20)
BUN SERPL-MCNC: 18 MG/DL (ref 6–20)
BUN SERPL-MCNC: 19 MG/DL (ref 6–20)
BUN SERPL-MCNC: 20 MG/DL (ref 6–20)
BUN SERPL-MCNC: 21 MG/DL (ref 6–20)
BUN SERPL-MCNC: 21 MG/DL (ref 6–20)
BUN SERPL-MCNC: 22 MG/DL (ref 6–20)
BUN SERPL-MCNC: 23 MG/DL (ref 6–20)
BUN SERPL-MCNC: 23 MG/DL (ref 8–27)
BUN SERPL-MCNC: 24 MG/DL (ref 6–20)
BUN SERPL-MCNC: 25 MG/DL (ref 6–20)
BUN SERPL-MCNC: 26 MG/DL (ref 6–20)
BUN SERPL-MCNC: 27 MG/DL (ref 6–20)
BUN SERPL-MCNC: 27 MG/DL (ref 8–27)
BUN SERPL-MCNC: 28 MG/DL (ref 6–20)
BUN SERPL-MCNC: 30 MG/DL (ref 6–20)
BUN SERPL-MCNC: 35 MG/DL (ref 6–20)
BUN/CREAT SERPL: 10 (ref 12–20)
BUN/CREAT SERPL: 11 (ref 12–20)
BUN/CREAT SERPL: 11 (ref 12–28)
BUN/CREAT SERPL: 12 (ref 12–20)
BUN/CREAT SERPL: 13 (ref 12–20)
BUN/CREAT SERPL: 14 (ref 12–20)
BUN/CREAT SERPL: 14 (ref 12–28)
BUN/CREAT SERPL: 15 (ref 12–20)
BUN/CREAT SERPL: 16 (ref 12–20)
BUN/CREAT SERPL: 17 (ref 12–20)
BUN/CREAT SERPL: 18 (ref 12–20)
BUN/CREAT SERPL: 9 (ref 12–20)
CALCIUM SERPL-MCNC: 8 MG/DL (ref 8.5–10.1)
CALCIUM SERPL-MCNC: 8.3 MG/DL (ref 8.5–10.1)
CALCIUM SERPL-MCNC: 8.4 MG/DL (ref 8.5–10.1)
CALCIUM SERPL-MCNC: 8.5 MG/DL (ref 8.5–10.1)
CALCIUM SERPL-MCNC: 8.6 MG/DL (ref 8.5–10.1)
CALCIUM SERPL-MCNC: 8.7 MG/DL (ref 8.5–10.1)
CALCIUM SERPL-MCNC: 8.8 MG/DL (ref 8.5–10.1)
CALCIUM SERPL-MCNC: 8.9 MG/DL (ref 8.5–10.1)
CALCIUM SERPL-MCNC: 9 MG/DL (ref 8.5–10.1)
CALCIUM SERPL-MCNC: 9.1 MG/DL (ref 8.5–10.1)
CALCIUM SERPL-MCNC: 9.1 MG/DL (ref 8.7–10.3)
CALCIUM SERPL-MCNC: 9.2 MG/DL (ref 8.5–10.1)
CALCIUM SERPL-MCNC: 9.4 MG/DL (ref 8.7–10.3)
CALCULATED P AXIS, ECG09: 75 DEGREES
CALCULATED R AXIS, ECG10: -101 DEGREES
CALCULATED R AXIS, ECG10: -16 DEGREES
CALCULATED R AXIS, ECG10: -17 DEGREES
CALCULATED R AXIS, ECG10: -29 DEGREES
CALCULATED R AXIS, ECG10: -31 DEGREES
CALCULATED R AXIS, ECG10: 105 DEGREES
CALCULATED R AXIS, ECG10: 135 DEGREES
CALCULATED R AXIS, ECG10: 146 DEGREES
CALCULATED T AXIS, ECG11: -29 DEGREES
CALCULATED T AXIS, ECG11: 101 DEGREES
CALCULATED T AXIS, ECG11: 113 DEGREES
CALCULATED T AXIS, ECG11: 118 DEGREES
CALCULATED T AXIS, ECG11: 126 DEGREES
CALCULATED T AXIS, ECG11: 39 DEGREES
CALCULATED T AXIS, ECG11: 63 DEGREES
CALCULATED T AXIS, ECG11: 89 DEGREES
CASTS URNS QL MICRO: NORMAL /LPF
CC UR VC: ABNORMAL
CHLORIDE SERPL-SCNC: 102 MMOL/L (ref 97–108)
CHLORIDE SERPL-SCNC: 103 MMOL/L (ref 96–106)
CHLORIDE SERPL-SCNC: 103 MMOL/L (ref 97–108)
CHLORIDE SERPL-SCNC: 104 MMOL/L (ref 97–108)
CHLORIDE SERPL-SCNC: 105 MMOL/L (ref 97–108)
CHLORIDE SERPL-SCNC: 106 MMOL/L (ref 97–108)
CHLORIDE SERPL-SCNC: 107 MMOL/L (ref 96–106)
CHLORIDE SERPL-SCNC: 107 MMOL/L (ref 97–108)
CHLORIDE SERPL-SCNC: 108 MMOL/L (ref 97–108)
CHLORIDE SERPL-SCNC: 109 MMOL/L (ref 97–108)
CHLORIDE SERPL-SCNC: 110 MMOL/L (ref 97–108)
CHLORIDE SERPL-SCNC: 111 MMOL/L (ref 97–108)
CHLORIDE SERPL-SCNC: 112 MMOL/L (ref 97–108)
CHLORIDE SERPL-SCNC: 113 MMOL/L (ref 97–108)
CHLORIDE SERPL-SCNC: 113 MMOL/L (ref 97–108)
CHLORIDE SERPL-SCNC: 99 MMOL/L (ref 97–108)
CHOLEST SERPL-MCNC: 73 MG/DL (ref 100–199)
CK SERPL-CCNC: 155 U/L (ref 26–192)
CK SERPL-CCNC: 20 U/L (ref 26–192)
CK SERPL-CCNC: 2496 U/L (ref 26–192)
CK SERPL-CCNC: 39 U/L (ref 26–192)
CK SERPL-CCNC: 4609 U/L (ref 26–192)
CK SERPL-CCNC: 80 U/L (ref 26–192)
CK SERPL-CCNC: ABNORMAL U/L (ref 26–192)
CK SERPL-CCNC: ABNORMAL U/L (ref 26–192)
CO2 SERPL-SCNC: 14 MMOL/L (ref 20–29)
CO2 SERPL-SCNC: 17 MMOL/L (ref 21–32)
CO2 SERPL-SCNC: 18 MMOL/L (ref 21–32)
CO2 SERPL-SCNC: 18 MMOL/L (ref 21–32)
CO2 SERPL-SCNC: 19 MMOL/L (ref 21–32)
CO2 SERPL-SCNC: 20 MMOL/L (ref 21–32)
CO2 SERPL-SCNC: 21 MMOL/L (ref 21–32)
CO2 SERPL-SCNC: 22 MMOL/L (ref 20–29)
CO2 SERPL-SCNC: 22 MMOL/L (ref 21–32)
CO2 SERPL-SCNC: 23 MMOL/L (ref 21–32)
CO2 SERPL-SCNC: 24 MMOL/L (ref 21–32)
CO2 SERPL-SCNC: 25 MMOL/L (ref 21–32)
CO2 SERPL-SCNC: 26 MMOL/L (ref 21–32)
CO2 SERPL-SCNC: 27 MMOL/L (ref 21–32)
CO2 SERPL-SCNC: 28 MMOL/L (ref 21–32)
CO2 SERPL-SCNC: 29 MMOL/L (ref 21–32)
COLOR UR: ABNORMAL
COLOR UR: YELLOW
COMMENT, HOLDF: NORMAL
COVID-19 RAPID TEST, COVR: NOT DETECTED
COVID-19, XGCOVT: NOT DETECTED
COVID-19, XGCOVT: NOT DETECTED
CREAT SERPL-MCNC: 1.26 MG/DL (ref 0.55–1.02)
CREAT SERPL-MCNC: 1.34 MG/DL (ref 0.55–1.02)
CREAT SERPL-MCNC: 1.34 MG/DL (ref 0.55–1.02)
CREAT SERPL-MCNC: 1.35 MG/DL (ref 0.55–1.02)
CREAT SERPL-MCNC: 1.38 MG/DL (ref 0.55–1.02)
CREAT SERPL-MCNC: 1.44 MG/DL (ref 0.55–1.02)
CREAT SERPL-MCNC: 1.45 MG/DL (ref 0.55–1.02)
CREAT SERPL-MCNC: 1.46 MG/DL (ref 0.55–1.02)
CREAT SERPL-MCNC: 1.46 MG/DL (ref 0.55–1.02)
CREAT SERPL-MCNC: 1.47 MG/DL (ref 0.55–1.02)
CREAT SERPL-MCNC: 1.47 MG/DL (ref 0.55–1.02)
CREAT SERPL-MCNC: 1.51 MG/DL (ref 0.55–1.02)
CREAT SERPL-MCNC: 1.52 MG/DL (ref 0.55–1.02)
CREAT SERPL-MCNC: 1.54 MG/DL (ref 0.55–1.02)
CREAT SERPL-MCNC: 1.54 MG/DL (ref 0.55–1.02)
CREAT SERPL-MCNC: 1.57 MG/DL (ref 0.55–1.02)
CREAT SERPL-MCNC: 1.59 MG/DL (ref 0.55–1.02)
CREAT SERPL-MCNC: 1.61 MG/DL (ref 0.55–1.02)
CREAT SERPL-MCNC: 1.62 MG/DL (ref 0.55–1.02)
CREAT SERPL-MCNC: 1.65 MG/DL (ref 0.55–1.02)
CREAT SERPL-MCNC: 1.66 MG/DL (ref 0.55–1.02)
CREAT SERPL-MCNC: 1.66 MG/DL (ref 0.55–1.02)
CREAT SERPL-MCNC: 1.67 MG/DL (ref 0.55–1.02)
CREAT SERPL-MCNC: 1.69 MG/DL (ref 0.55–1.02)
CREAT SERPL-MCNC: 1.7 MG/DL (ref 0.55–1.02)
CREAT SERPL-MCNC: 1.71 MG/DL (ref 0.55–1.02)
CREAT SERPL-MCNC: 1.71 MG/DL (ref 0.55–1.02)
CREAT SERPL-MCNC: 1.73 MG/DL (ref 0.55–1.02)
CREAT SERPL-MCNC: 1.75 MG/DL (ref 0.55–1.02)
CREAT SERPL-MCNC: 1.78 MG/DL (ref 0.55–1.02)
CREAT SERPL-MCNC: 1.79 MG/DL (ref 0.55–1.02)
CREAT SERPL-MCNC: 1.8 MG/DL (ref 0.55–1.02)
CREAT SERPL-MCNC: 1.83 MG/DL (ref 0.55–1.02)
CREAT SERPL-MCNC: 1.84 MG/DL (ref 0.55–1.02)
CREAT SERPL-MCNC: 1.85 MG/DL (ref 0.55–1.02)
CREAT SERPL-MCNC: 1.86 MG/DL (ref 0.55–1.02)
CREAT SERPL-MCNC: 1.87 MG/DL (ref 0.55–1.02)
CREAT SERPL-MCNC: 1.88 MG/DL (ref 0.55–1.02)
CREAT SERPL-MCNC: 1.9 MG/DL (ref 0.55–1.02)
CREAT SERPL-MCNC: 1.9 MG/DL (ref 0.55–1.02)
CREAT SERPL-MCNC: 1.91 MG/DL (ref 0.55–1.02)
CREAT SERPL-MCNC: 1.92 MG/DL (ref 0.55–1.02)
CREAT SERPL-MCNC: 1.93 MG/DL (ref 0.55–1.02)
CREAT SERPL-MCNC: 1.94 MG/DL (ref 0.55–1.02)
CREAT SERPL-MCNC: 1.94 MG/DL (ref 0.57–1)
CREAT SERPL-MCNC: 1.95 MG/DL (ref 0.55–1.02)
CREAT SERPL-MCNC: 1.96 MG/DL (ref 0.55–1.02)
CREAT SERPL-MCNC: 1.97 MG/DL (ref 0.55–1.02)
CREAT SERPL-MCNC: 1.99 MG/DL (ref 0.55–1.02)
CREAT SERPL-MCNC: 1.99 MG/DL (ref 0.55–1.02)
CREAT SERPL-MCNC: 2.06 MG/DL (ref 0.55–1.02)
CREAT SERPL-MCNC: 2.06 MG/DL (ref 0.57–1)
CREAT SERPL-MCNC: 2.13 MG/DL (ref 0.55–1.02)
CREAT SERPL-MCNC: 2.15 MG/DL (ref 0.55–1.02)
CREAT SERPL-MCNC: 2.16 MG/DL (ref 0.55–1.02)
CREAT SERPL-MCNC: 2.17 MG/DL (ref 0.55–1.02)
CREAT SERPL-MCNC: 2.17 MG/DL (ref 0.55–1.02)
CREAT SERPL-MCNC: 2.28 MG/DL (ref 0.55–1.02)
CREAT SERPL-MCNC: 2.33 MG/DL (ref 0.55–1.02)
CREAT SERPL-MCNC: 2.55 MG/DL (ref 0.55–1.02)
CREAT UR-MCNC: 96.6 MG/DL
CROSSMATCH RESULT,%XM: NORMAL
CROSSMATCH RESULT,%XM: NORMAL
CRP SERPL-MCNC: 16.1 MG/DL (ref 0–0.6)
CRP SERPL-MCNC: 2.73 MG/DL (ref 0–0.6)
DATE LAST DOSE: ABNORMAL
DIAGNOSIS, 93000: NORMAL
DIFFERENTIAL METHOD BLD: ABNORMAL
ECHO AO ROOT DIAM: 2.97 CM
ECHO AO ROOT DIAM: 3 CM
ECHO AV AREA PEAK VELOCITY: 1.33 CM2
ECHO AV AREA PEAK VELOCITY: 1.43 CM2
ECHO AV AREA VTI: 1.34 CM2
ECHO AV AREA/BSA PEAK VELOCITY: 0.6 CM2/M2
ECHO AV AREA/BSA PEAK VELOCITY: 0.6 CM2/M2
ECHO AV AREA/BSA VTI: 0.6 CM2/M2
ECHO AV MEAN GRADIENT: 6.04 MMHG
ECHO AV PEAK GRADIENT: 10.23 MMHG
ECHO AV PEAK GRADIENT: 11.94 MMHG
ECHO AV PEAK VELOCITY: 159.92 CM/S
ECHO AV PEAK VELOCITY: 172.77 CM/S
ECHO AV VTI: 25.84 CM
ECHO LA AREA 4C: 24.47 CM2
ECHO LA AREA 4C: 27.74 CM2
ECHO LA MAJOR AXIS: 4.36 CM
ECHO LA MAJOR AXIS: 5.28 CM
ECHO LA MINOR AXIS: 1.87 CM
ECHO LA MINOR AXIS: 2.34 CM
ECHO LA VOL 2C: 57.78 ML (ref 22–52)
ECHO LA VOL 2C: 89.87 ML (ref 22–52)
ECHO LA VOL 4C: 102.92 ML (ref 22–52)
ECHO LA VOL 4C: 80.09 ML (ref 22–52)
ECHO LA VOL BP: 100.25 ML (ref 22–52)
ECHO LA VOL BP: 74.94 ML (ref 22–52)
ECHO LA VOL/BSA BIPLANE: 32.17 ML/M2 (ref 16–28)
ECHO LA VOL/BSA BIPLANE: 44.45 ML/M2 (ref 16–28)
ECHO LA VOLUME INDEX A2C: 24.8 ML/M2 (ref 16–28)
ECHO LA VOLUME INDEX A2C: 39.85 ML/M2 (ref 16–28)
ECHO LA VOLUME INDEX A4C: 34.38 ML/M2 (ref 16–28)
ECHO LA VOLUME INDEX A4C: 45.63 ML/M2 (ref 16–28)
ECHO LV INTERNAL DIMENSION DIASTOLIC: 3.99 CM (ref 3.9–5.3)
ECHO LV INTERNAL DIMENSION DIASTOLIC: 4.01 CM (ref 3.9–5.3)
ECHO LV INTERNAL DIMENSION SYSTOLIC: 2.74 CM
ECHO LV INTERNAL DIMENSION SYSTOLIC: 2.8 CM
ECHO LV IVSD: 0.94 CM (ref 0.6–0.9)
ECHO LV IVSD: 1.3 CM (ref 0.6–0.9)
ECHO LV MASS 2D: 123.9 G (ref 67–162)
ECHO LV MASS 2D: 185.1 G (ref 67–162)
ECHO LV MASS INDEX 2D: 53.2 G/M2 (ref 43–95)
ECHO LV MASS INDEX 2D: 82.1 G/M2 (ref 43–95)
ECHO LV POSTERIOR WALL DIASTOLIC: 1.03 CM (ref 0.6–0.9)
ECHO LV POSTERIOR WALL DIASTOLIC: 1.28 CM (ref 0.6–0.9)
ECHO LVOT DIAM: 1.95 CM
ECHO LVOT DIAM: 2.04 CM
ECHO LVOT PEAK GRADIENT: 2.01 MMHG
ECHO LVOT PEAK GRADIENT: 2.26 MMHG
ECHO LVOT PEAK VELOCITY: 70.92 CM/S
ECHO LVOT PEAK VELOCITY: 75.23 CM/S
ECHO LVOT SV: 34.7 ML
ECHO LVOT VTI: 11.57 CM
ECHO MV AREA VTI: 1.15 CM2
ECHO MV E VELOCITY: 142.99 CM/S
ECHO MV MAX VELOCITY: 141.89 CM/S
ECHO MV MEAN GRADIENT: 4.08 MMHG
ECHO MV PEAK GRADIENT: 8.05 MMHG
ECHO MV VTI: 30.04 CM
ECHO PV MAX VELOCITY: 89.5 CM/S
ECHO PV PEAK INSTANTANEOUS GRADIENT SYSTOLIC: 3.2 MMHG
ECHO PV PEAK INSTANTANEOUS GRADIENT SYSTOLIC: 3.72 MMHG
ECHO RV INTERNAL DIMENSION: 4.03 CM
ECHO RV TAPSE: 1.71 CM (ref 1.5–2)
ECHO RV TAPSE: 2.2 CM (ref 1.5–2)
ECHO TV REGURGITANT MAX VELOCITY: 263.27 CM/S
ECHO TV REGURGITANT MAX VELOCITY: 293.77 CM/S
ECHO TV REGURGITANT PEAK GRADIENT: 27.73 MMHG
ECHO TV REGURGITANT PEAK GRADIENT: 34.52 MMHG
EOSINOPHIL # BLD AUTO: 0.1 X10E3/UL (ref 0–0.4)
EOSINOPHIL # BLD AUTO: 0.2 X10E3/UL (ref 0–0.4)
EOSINOPHIL # BLD: 0 K/UL (ref 0–0.4)
EOSINOPHIL # BLD: 0 K/UL (ref 0–0.4)
EOSINOPHIL # BLD: 0.1 K/UL (ref 0–0.4)
EOSINOPHIL # BLD: 0.2 K/UL (ref 0–0.4)
EOSINOPHIL # BLD: 0.3 K/UL (ref 0–0.4)
EOSINOPHIL # BLD: 0.4 K/UL (ref 0–0.4)
EOSINOPHIL # BLD: 0.4 K/UL (ref 0–0.4)
EOSINOPHIL NFR BLD AUTO: 3 %
EOSINOPHIL NFR BLD AUTO: 3 %
EOSINOPHIL NFR BLD: 0 % (ref 0–7)
EOSINOPHIL NFR BLD: 0 % (ref 0–7)
EOSINOPHIL NFR BLD: 1 % (ref 0–7)
EOSINOPHIL NFR BLD: 2 % (ref 0–7)
EOSINOPHIL NFR BLD: 3 % (ref 0–7)
EOSINOPHIL NFR BLD: 3 % (ref 0–7)
EOSINOPHIL NFR BLD: 4 % (ref 0–7)
EOSINOPHIL NFR BLD: 5 % (ref 0–7)
EOSINOPHIL NFR BLD: 5 % (ref 0–7)
EOSINOPHIL NFR BLD: 6 % (ref 0–7)
EPI CELLS #/AREA URNS HPF: NORMAL /HPF (ref 0–10)
EPITH CASTS URNS QL MICRO: ABNORMAL /LPF
ERYTHROCYTE [DISTWIDTH] IN BLOOD BY AUTOMATED COUNT: 14.6 % (ref 11.7–15.4)
ERYTHROCYTE [DISTWIDTH] IN BLOOD BY AUTOMATED COUNT: 15.1 % (ref 11.5–14.5)
ERYTHROCYTE [DISTWIDTH] IN BLOOD BY AUTOMATED COUNT: 15.2 % (ref 11.5–14.5)
ERYTHROCYTE [DISTWIDTH] IN BLOOD BY AUTOMATED COUNT: 15.4 % (ref 11.5–14.5)
ERYTHROCYTE [DISTWIDTH] IN BLOOD BY AUTOMATED COUNT: 15.5 % (ref 11.5–14.5)
ERYTHROCYTE [DISTWIDTH] IN BLOOD BY AUTOMATED COUNT: 15.6 % (ref 11.5–14.5)
ERYTHROCYTE [DISTWIDTH] IN BLOOD BY AUTOMATED COUNT: 15.7 % (ref 11.5–14.5)
ERYTHROCYTE [DISTWIDTH] IN BLOOD BY AUTOMATED COUNT: 15.7 % (ref 11.5–14.5)
ERYTHROCYTE [DISTWIDTH] IN BLOOD BY AUTOMATED COUNT: 15.8 % (ref 11.5–14.5)
ERYTHROCYTE [DISTWIDTH] IN BLOOD BY AUTOMATED COUNT: 15.9 % (ref 11.5–14.5)
ERYTHROCYTE [DISTWIDTH] IN BLOOD BY AUTOMATED COUNT: 16 % (ref 11.5–14.5)
ERYTHROCYTE [DISTWIDTH] IN BLOOD BY AUTOMATED COUNT: 16.1 % (ref 11.5–14.5)
ERYTHROCYTE [DISTWIDTH] IN BLOOD BY AUTOMATED COUNT: 16.2 % (ref 11.5–14.5)
ERYTHROCYTE [DISTWIDTH] IN BLOOD BY AUTOMATED COUNT: 16.4 % (ref 11.5–14.5)
ERYTHROCYTE [DISTWIDTH] IN BLOOD BY AUTOMATED COUNT: 16.5 % (ref 11.5–14.5)
ERYTHROCYTE [DISTWIDTH] IN BLOOD BY AUTOMATED COUNT: 17.2 % (ref 11.5–14.5)
ERYTHROCYTE [DISTWIDTH] IN BLOOD BY AUTOMATED COUNT: 17.3 % (ref 11.5–14.5)
ERYTHROCYTE [DISTWIDTH] IN BLOOD BY AUTOMATED COUNT: 17.7 % (ref 11.5–14.5)
ERYTHROCYTE [DISTWIDTH] IN BLOOD BY AUTOMATED COUNT: 17.7 % (ref 11.5–14.5)
ERYTHROCYTE [DISTWIDTH] IN BLOOD BY AUTOMATED COUNT: 17.9 % (ref 11.5–14.5)
ERYTHROCYTE [DISTWIDTH] IN BLOOD BY AUTOMATED COUNT: 18 % (ref 11.5–14.5)
ERYTHROCYTE [DISTWIDTH] IN BLOOD BY AUTOMATED COUNT: 18 % (ref 11.7–15.4)
ERYTHROCYTE [DISTWIDTH] IN BLOOD BY AUTOMATED COUNT: 19.5 % (ref 11.5–14.5)
ERYTHROCYTE [DISTWIDTH] IN BLOOD BY AUTOMATED COUNT: 19.7 % (ref 11.5–14.5)
ERYTHROCYTE [DISTWIDTH] IN BLOOD BY AUTOMATED COUNT: 20 % (ref 11.5–14.5)
ERYTHROCYTE [SEDIMENTATION RATE] IN BLOOD: 57 MM/HR (ref 0–30)
ERYTHROCYTE [SEDIMENTATION RATE] IN BLOOD: 63 MM/HR (ref 0–30)
EST. AVERAGE GLUCOSE BLD GHB EST-MCNC: 128 MG/DL
EST. AVERAGE GLUCOSE BLD GHB EST-MCNC: 143 MG/DL
FERRITIN SERPL-MCNC: 17 NG/ML (ref 26–388)
FLUID CULTURE, SPNG2: NORMAL
GLOBULIN SER CALC-MCNC: 3.5 G/DL (ref 2–4)
GLOBULIN SER CALC-MCNC: 3.9 G/DL (ref 2–4)
GLOBULIN SER CALC-MCNC: 3.9 G/DL (ref 2–4)
GLOBULIN SER CALC-MCNC: 4 G/DL (ref 2–4)
GLOBULIN SER CALC-MCNC: 4 G/DL (ref 2–4)
GLOBULIN SER CALC-MCNC: 4.1 G/DL (ref 2–4)
GLOBULIN SER CALC-MCNC: 4.2 G/DL (ref 2–4)
GLOBULIN SER CALC-MCNC: 4.2 G/DL (ref 2–4)
GLOBULIN SER CALC-MCNC: 4.3 G/DL (ref 2–4)
GLOBULIN SER CALC-MCNC: 4.4 G/DL (ref 2–4)
GLOBULIN SER CALC-MCNC: 5.4 G/DL (ref 2–4)
GLUCOSE BLD STRIP.AUTO-MCNC: 100 MG/DL (ref 65–100)
GLUCOSE BLD STRIP.AUTO-MCNC: 101 MG/DL (ref 65–100)
GLUCOSE BLD STRIP.AUTO-MCNC: 102 MG/DL (ref 65–100)
GLUCOSE BLD STRIP.AUTO-MCNC: 103 MG/DL (ref 65–100)
GLUCOSE BLD STRIP.AUTO-MCNC: 104 MG/DL (ref 65–100)
GLUCOSE BLD STRIP.AUTO-MCNC: 104 MG/DL (ref 65–100)
GLUCOSE BLD STRIP.AUTO-MCNC: 105 MG/DL (ref 65–100)
GLUCOSE BLD STRIP.AUTO-MCNC: 106 MG/DL (ref 65–100)
GLUCOSE BLD STRIP.AUTO-MCNC: 107 MG/DL (ref 65–100)
GLUCOSE BLD STRIP.AUTO-MCNC: 107 MG/DL (ref 65–100)
GLUCOSE BLD STRIP.AUTO-MCNC: 108 MG/DL (ref 65–100)
GLUCOSE BLD STRIP.AUTO-MCNC: 109 MG/DL (ref 65–100)
GLUCOSE BLD STRIP.AUTO-MCNC: 109 MG/DL (ref 65–100)
GLUCOSE BLD STRIP.AUTO-MCNC: 110 MG/DL (ref 65–100)
GLUCOSE BLD STRIP.AUTO-MCNC: 111 MG/DL (ref 65–100)
GLUCOSE BLD STRIP.AUTO-MCNC: 112 MG/DL (ref 65–100)
GLUCOSE BLD STRIP.AUTO-MCNC: 113 MG/DL (ref 65–100)
GLUCOSE BLD STRIP.AUTO-MCNC: 114 MG/DL (ref 65–100)
GLUCOSE BLD STRIP.AUTO-MCNC: 115 MG/DL (ref 65–100)
GLUCOSE BLD STRIP.AUTO-MCNC: 116 MG/DL (ref 65–100)
GLUCOSE BLD STRIP.AUTO-MCNC: 117 MG/DL (ref 65–100)
GLUCOSE BLD STRIP.AUTO-MCNC: 118 MG/DL (ref 65–100)
GLUCOSE BLD STRIP.AUTO-MCNC: 119 MG/DL (ref 65–100)
GLUCOSE BLD STRIP.AUTO-MCNC: 120 MG/DL (ref 65–100)
GLUCOSE BLD STRIP.AUTO-MCNC: 122 MG/DL (ref 65–100)
GLUCOSE BLD STRIP.AUTO-MCNC: 123 MG/DL (ref 65–100)
GLUCOSE BLD STRIP.AUTO-MCNC: 124 MG/DL (ref 65–100)
GLUCOSE BLD STRIP.AUTO-MCNC: 125 MG/DL (ref 65–100)
GLUCOSE BLD STRIP.AUTO-MCNC: 126 MG/DL (ref 65–100)
GLUCOSE BLD STRIP.AUTO-MCNC: 127 MG/DL (ref 65–100)
GLUCOSE BLD STRIP.AUTO-MCNC: 128 MG/DL (ref 65–100)
GLUCOSE BLD STRIP.AUTO-MCNC: 129 MG/DL (ref 65–100)
GLUCOSE BLD STRIP.AUTO-MCNC: 131 MG/DL (ref 65–100)
GLUCOSE BLD STRIP.AUTO-MCNC: 132 MG/DL (ref 65–100)
GLUCOSE BLD STRIP.AUTO-MCNC: 133 MG/DL (ref 65–100)
GLUCOSE BLD STRIP.AUTO-MCNC: 133 MG/DL (ref 65–100)
GLUCOSE BLD STRIP.AUTO-MCNC: 134 MG/DL (ref 65–100)
GLUCOSE BLD STRIP.AUTO-MCNC: 134 MG/DL (ref 65–100)
GLUCOSE BLD STRIP.AUTO-MCNC: 135 MG/DL (ref 65–100)
GLUCOSE BLD STRIP.AUTO-MCNC: 136 MG/DL (ref 65–100)
GLUCOSE BLD STRIP.AUTO-MCNC: 137 MG/DL (ref 65–100)
GLUCOSE BLD STRIP.AUTO-MCNC: 137 MG/DL (ref 65–100)
GLUCOSE BLD STRIP.AUTO-MCNC: 138 MG/DL (ref 65–100)
GLUCOSE BLD STRIP.AUTO-MCNC: 139 MG/DL (ref 65–100)
GLUCOSE BLD STRIP.AUTO-MCNC: 141 MG/DL (ref 65–100)
GLUCOSE BLD STRIP.AUTO-MCNC: 142 MG/DL (ref 65–100)
GLUCOSE BLD STRIP.AUTO-MCNC: 143 MG/DL (ref 65–100)
GLUCOSE BLD STRIP.AUTO-MCNC: 146 MG/DL (ref 65–100)
GLUCOSE BLD STRIP.AUTO-MCNC: 147 MG/DL (ref 65–100)
GLUCOSE BLD STRIP.AUTO-MCNC: 148 MG/DL (ref 65–100)
GLUCOSE BLD STRIP.AUTO-MCNC: 149 MG/DL (ref 65–100)
GLUCOSE BLD STRIP.AUTO-MCNC: 149 MG/DL (ref 65–100)
GLUCOSE BLD STRIP.AUTO-MCNC: 150 MG/DL (ref 65–100)
GLUCOSE BLD STRIP.AUTO-MCNC: 151 MG/DL (ref 65–100)
GLUCOSE BLD STRIP.AUTO-MCNC: 151 MG/DL (ref 65–100)
GLUCOSE BLD STRIP.AUTO-MCNC: 152 MG/DL (ref 65–100)
GLUCOSE BLD STRIP.AUTO-MCNC: 152 MG/DL (ref 65–100)
GLUCOSE BLD STRIP.AUTO-MCNC: 154 MG/DL (ref 65–100)
GLUCOSE BLD STRIP.AUTO-MCNC: 154 MG/DL (ref 65–100)
GLUCOSE BLD STRIP.AUTO-MCNC: 155 MG/DL (ref 65–100)
GLUCOSE BLD STRIP.AUTO-MCNC: 156 MG/DL (ref 65–100)
GLUCOSE BLD STRIP.AUTO-MCNC: 156 MG/DL (ref 65–100)
GLUCOSE BLD STRIP.AUTO-MCNC: 157 MG/DL (ref 65–100)
GLUCOSE BLD STRIP.AUTO-MCNC: 163 MG/DL (ref 65–100)
GLUCOSE BLD STRIP.AUTO-MCNC: 165 MG/DL (ref 65–100)
GLUCOSE BLD STRIP.AUTO-MCNC: 167 MG/DL (ref 65–100)
GLUCOSE BLD STRIP.AUTO-MCNC: 167 MG/DL (ref 65–100)
GLUCOSE BLD STRIP.AUTO-MCNC: 168 MG/DL (ref 65–100)
GLUCOSE BLD STRIP.AUTO-MCNC: 171 MG/DL (ref 65–100)
GLUCOSE BLD STRIP.AUTO-MCNC: 172 MG/DL (ref 65–100)
GLUCOSE BLD STRIP.AUTO-MCNC: 172 MG/DL (ref 65–100)
GLUCOSE BLD STRIP.AUTO-MCNC: 177 MG/DL (ref 65–100)
GLUCOSE BLD STRIP.AUTO-MCNC: 179 MG/DL (ref 65–100)
GLUCOSE BLD STRIP.AUTO-MCNC: 180 MG/DL (ref 65–100)
GLUCOSE BLD STRIP.AUTO-MCNC: 186 MG/DL (ref 65–100)
GLUCOSE BLD STRIP.AUTO-MCNC: 186 MG/DL (ref 65–100)
GLUCOSE BLD STRIP.AUTO-MCNC: 187 MG/DL (ref 65–100)
GLUCOSE BLD STRIP.AUTO-MCNC: 188 MG/DL (ref 65–100)
GLUCOSE BLD STRIP.AUTO-MCNC: 193 MG/DL (ref 65–100)
GLUCOSE BLD STRIP.AUTO-MCNC: 194 MG/DL (ref 65–100)
GLUCOSE BLD STRIP.AUTO-MCNC: 203 MG/DL (ref 65–100)
GLUCOSE BLD STRIP.AUTO-MCNC: 205 MG/DL (ref 65–100)
GLUCOSE BLD STRIP.AUTO-MCNC: 215 MG/DL (ref 65–100)
GLUCOSE BLD STRIP.AUTO-MCNC: 218 MG/DL (ref 65–100)
GLUCOSE BLD STRIP.AUTO-MCNC: 219 MG/DL (ref 65–100)
GLUCOSE BLD STRIP.AUTO-MCNC: 229 MG/DL (ref 65–100)
GLUCOSE BLD STRIP.AUTO-MCNC: 231 MG/DL (ref 65–100)
GLUCOSE BLD STRIP.AUTO-MCNC: 245 MG/DL (ref 65–100)
GLUCOSE BLD STRIP.AUTO-MCNC: 261 MG/DL (ref 65–100)
GLUCOSE BLD STRIP.AUTO-MCNC: 311 MG/DL (ref 65–100)
GLUCOSE BLD STRIP.AUTO-MCNC: 51 MG/DL (ref 65–100)
GLUCOSE BLD STRIP.AUTO-MCNC: 64 MG/DL (ref 65–100)
GLUCOSE BLD STRIP.AUTO-MCNC: 65 MG/DL (ref 65–100)
GLUCOSE BLD STRIP.AUTO-MCNC: 68 MG/DL (ref 65–100)
GLUCOSE BLD STRIP.AUTO-MCNC: 72 MG/DL (ref 65–100)
GLUCOSE BLD STRIP.AUTO-MCNC: 75 MG/DL (ref 65–100)
GLUCOSE BLD STRIP.AUTO-MCNC: 76 MG/DL (ref 65–100)
GLUCOSE BLD STRIP.AUTO-MCNC: 77 MG/DL (ref 65–100)
GLUCOSE BLD STRIP.AUTO-MCNC: 78 MG/DL (ref 65–100)
GLUCOSE BLD STRIP.AUTO-MCNC: 80 MG/DL (ref 65–100)
GLUCOSE BLD STRIP.AUTO-MCNC: 84 MG/DL (ref 65–100)
GLUCOSE BLD STRIP.AUTO-MCNC: 85 MG/DL (ref 65–100)
GLUCOSE BLD STRIP.AUTO-MCNC: 85 MG/DL (ref 65–100)
GLUCOSE BLD STRIP.AUTO-MCNC: 87 MG/DL (ref 65–100)
GLUCOSE BLD STRIP.AUTO-MCNC: 87 MG/DL (ref 65–100)
GLUCOSE BLD STRIP.AUTO-MCNC: 88 MG/DL (ref 65–100)
GLUCOSE BLD STRIP.AUTO-MCNC: 88 MG/DL (ref 65–100)
GLUCOSE BLD STRIP.AUTO-MCNC: 89 MG/DL (ref 65–100)
GLUCOSE BLD STRIP.AUTO-MCNC: 91 MG/DL (ref 65–100)
GLUCOSE BLD STRIP.AUTO-MCNC: 91 MG/DL (ref 65–100)
GLUCOSE BLD STRIP.AUTO-MCNC: 92 MG/DL (ref 65–100)
GLUCOSE BLD STRIP.AUTO-MCNC: 93 MG/DL (ref 65–100)
GLUCOSE BLD STRIP.AUTO-MCNC: 94 MG/DL (ref 65–100)
GLUCOSE BLD STRIP.AUTO-MCNC: 95 MG/DL (ref 65–100)
GLUCOSE BLD STRIP.AUTO-MCNC: 96 MG/DL (ref 65–100)
GLUCOSE BLD STRIP.AUTO-MCNC: 97 MG/DL (ref 65–100)
GLUCOSE BLD STRIP.AUTO-MCNC: 98 MG/DL (ref 65–100)
GLUCOSE BLD STRIP.AUTO-MCNC: 99 MG/DL (ref 65–100)
GLUCOSE SERPL-MCNC: 101 MG/DL (ref 65–100)
GLUCOSE SERPL-MCNC: 102 MG/DL (ref 65–100)
GLUCOSE SERPL-MCNC: 102 MG/DL (ref 65–100)
GLUCOSE SERPL-MCNC: 103 MG/DL (ref 65–100)
GLUCOSE SERPL-MCNC: 103 MG/DL (ref 65–100)
GLUCOSE SERPL-MCNC: 105 MG/DL (ref 65–100)
GLUCOSE SERPL-MCNC: 106 MG/DL (ref 65–100)
GLUCOSE SERPL-MCNC: 106 MG/DL (ref 65–100)
GLUCOSE SERPL-MCNC: 108 MG/DL (ref 65–100)
GLUCOSE SERPL-MCNC: 108 MG/DL (ref 65–100)
GLUCOSE SERPL-MCNC: 109 MG/DL (ref 65–100)
GLUCOSE SERPL-MCNC: 112 MG/DL (ref 65–100)
GLUCOSE SERPL-MCNC: 112 MG/DL (ref 65–100)
GLUCOSE SERPL-MCNC: 113 MG/DL (ref 65–100)
GLUCOSE SERPL-MCNC: 116 MG/DL (ref 65–100)
GLUCOSE SERPL-MCNC: 116 MG/DL (ref 65–100)
GLUCOSE SERPL-MCNC: 117 MG/DL (ref 65–100)
GLUCOSE SERPL-MCNC: 121 MG/DL (ref 65–100)
GLUCOSE SERPL-MCNC: 121 MG/DL (ref 65–99)
GLUCOSE SERPL-MCNC: 124 MG/DL (ref 65–100)
GLUCOSE SERPL-MCNC: 124 MG/DL (ref 65–100)
GLUCOSE SERPL-MCNC: 125 MG/DL (ref 65–100)
GLUCOSE SERPL-MCNC: 126 MG/DL (ref 65–100)
GLUCOSE SERPL-MCNC: 126 MG/DL (ref 65–100)
GLUCOSE SERPL-MCNC: 127 MG/DL (ref 65–100)
GLUCOSE SERPL-MCNC: 133 MG/DL (ref 65–100)
GLUCOSE SERPL-MCNC: 135 MG/DL (ref 65–100)
GLUCOSE SERPL-MCNC: 138 MG/DL (ref 65–100)
GLUCOSE SERPL-MCNC: 139 MG/DL (ref 65–100)
GLUCOSE SERPL-MCNC: 141 MG/DL (ref 65–100)
GLUCOSE SERPL-MCNC: 146 MG/DL (ref 65–100)
GLUCOSE SERPL-MCNC: 159 MG/DL (ref 65–100)
GLUCOSE SERPL-MCNC: 162 MG/DL (ref 65–100)
GLUCOSE SERPL-MCNC: 166 MG/DL (ref 65–100)
GLUCOSE SERPL-MCNC: 171 MG/DL (ref 65–100)
GLUCOSE SERPL-MCNC: 179 MG/DL (ref 65–99)
GLUCOSE SERPL-MCNC: 205 MG/DL (ref 65–100)
GLUCOSE SERPL-MCNC: 57 MG/DL (ref 65–100)
GLUCOSE SERPL-MCNC: 60 MG/DL (ref 65–100)
GLUCOSE SERPL-MCNC: 62 MG/DL (ref 65–100)
GLUCOSE SERPL-MCNC: 68 MG/DL (ref 65–100)
GLUCOSE SERPL-MCNC: 68 MG/DL (ref 65–100)
GLUCOSE SERPL-MCNC: 70 MG/DL (ref 65–100)
GLUCOSE SERPL-MCNC: 74 MG/DL (ref 65–100)
GLUCOSE SERPL-MCNC: 74 MG/DL (ref 65–100)
GLUCOSE SERPL-MCNC: 75 MG/DL (ref 65–100)
GLUCOSE SERPL-MCNC: 75 MG/DL (ref 65–100)
GLUCOSE SERPL-MCNC: 79 MG/DL (ref 65–100)
GLUCOSE SERPL-MCNC: 83 MG/DL (ref 65–100)
GLUCOSE SERPL-MCNC: 83 MG/DL (ref 65–100)
GLUCOSE SERPL-MCNC: 84 MG/DL (ref 65–100)
GLUCOSE SERPL-MCNC: 84 MG/DL (ref 65–100)
GLUCOSE SERPL-MCNC: 87 MG/DL (ref 65–100)
GLUCOSE SERPL-MCNC: 87 MG/DL (ref 65–100)
GLUCOSE SERPL-MCNC: 89 MG/DL (ref 65–100)
GLUCOSE SERPL-MCNC: 89 MG/DL (ref 65–100)
GLUCOSE SERPL-MCNC: 92 MG/DL (ref 65–100)
GLUCOSE SERPL-MCNC: 93 MG/DL (ref 65–100)
GLUCOSE SERPL-MCNC: 94 MG/DL (ref 65–100)
GLUCOSE SERPL-MCNC: 95 MG/DL (ref 65–100)
GLUCOSE SERPL-MCNC: 96 MG/DL (ref 65–100)
GLUCOSE SERPL-MCNC: 98 MG/DL (ref 65–100)
GLUCOSE SERPL-MCNC: 99 MG/DL (ref 65–100)
GLUCOSE UR QL: NEGATIVE
GLUCOSE UR STRIP.AUTO-MCNC: NEGATIVE MG/DL
GLUCOSE UR-MCNC: NEGATIVE MG/DL
GRAM STN SPEC: ABNORMAL
HBA1C MFR BLD: 6.1 % (ref 4–5.6)
HBA1C MFR BLD: 6.6 % (ref 4–5.6)
HCT VFR BLD AUTO: 23.2 % (ref 35–47)
HCT VFR BLD AUTO: 24.3 % (ref 35–47)
HCT VFR BLD AUTO: 25.2 % (ref 35–47)
HCT VFR BLD AUTO: 25.3 % (ref 35–47)
HCT VFR BLD AUTO: 25.4 % (ref 35–47)
HCT VFR BLD AUTO: 26 % (ref 35–47)
HCT VFR BLD AUTO: 26.1 % (ref 35–47)
HCT VFR BLD AUTO: 26.3 % (ref 35–47)
HCT VFR BLD AUTO: 26.4 % (ref 35–47)
HCT VFR BLD AUTO: 26.8 % (ref 35–47)
HCT VFR BLD AUTO: 26.8 % (ref 35–47)
HCT VFR BLD AUTO: 27.2 % (ref 35–47)
HCT VFR BLD AUTO: 27.5 % (ref 35–47)
HCT VFR BLD AUTO: 27.6 % (ref 35–47)
HCT VFR BLD AUTO: 27.8 % (ref 35–47)
HCT VFR BLD AUTO: 27.8 % (ref 35–47)
HCT VFR BLD AUTO: 28.1 % (ref 35–47)
HCT VFR BLD AUTO: 28.2 % (ref 35–47)
HCT VFR BLD AUTO: 28.6 % (ref 34–46.6)
HCT VFR BLD AUTO: 29.5 % (ref 35–47)
HCT VFR BLD AUTO: 29.7 % (ref 35–47)
HCT VFR BLD AUTO: 29.9 % (ref 35–47)
HCT VFR BLD AUTO: 30 % (ref 34–46.6)
HCT VFR BLD AUTO: 30.2 % (ref 35–47)
HCT VFR BLD AUTO: 30.2 % (ref 35–47)
HCT VFR BLD AUTO: 30.3 % (ref 35–47)
HCT VFR BLD AUTO: 30.5 % (ref 35–47)
HCT VFR BLD AUTO: 30.6 % (ref 35–47)
HCT VFR BLD AUTO: 30.8 % (ref 35–47)
HCT VFR BLD AUTO: 30.9 % (ref 35–47)
HCT VFR BLD AUTO: 30.9 % (ref 35–47)
HCT VFR BLD AUTO: 31.3 % (ref 35–47)
HCT VFR BLD AUTO: 31.3 % (ref 35–47)
HCT VFR BLD AUTO: 31.6 % (ref 35–47)
HCT VFR BLD AUTO: 32.3 % (ref 35–47)
HCT VFR BLD AUTO: 32.9 % (ref 35–47)
HCT VFR BLD AUTO: 33.5 % (ref 35–47)
HCT VFR BLD AUTO: 39.1 % (ref 35–47)
HDLC SERPL-MCNC: 36 MG/DL
HEALTH STATUS, XMCV2T: ABNORMAL
HEALTH STATUS, XMCV2T: NORMAL
HGB BLD-MCNC: 10 G/DL (ref 11.5–16)
HGB BLD-MCNC: 11.9 G/DL (ref 11.5–16)
HGB BLD-MCNC: 7 G/DL (ref 11.5–16)
HGB BLD-MCNC: 7.4 G/DL (ref 11.5–16)
HGB BLD-MCNC: 7.6 G/DL (ref 11.5–16)
HGB BLD-MCNC: 7.7 G/DL (ref 11.5–16)
HGB BLD-MCNC: 7.8 G/DL (ref 11.5–16)
HGB BLD-MCNC: 7.9 G/DL (ref 11.5–16)
HGB BLD-MCNC: 7.9 G/DL (ref 11.5–16)
HGB BLD-MCNC: 8 G/DL (ref 11.5–16)
HGB BLD-MCNC: 8 G/DL (ref 11.5–16)
HGB BLD-MCNC: 8.1 G/DL (ref 11.5–16)
HGB BLD-MCNC: 8.1 G/DL (ref 11.5–16)
HGB BLD-MCNC: 8.3 G/DL (ref 11.1–15.9)
HGB BLD-MCNC: 8.3 G/DL (ref 11.5–16)
HGB BLD-MCNC: 8.4 G/DL (ref 11.5–16)
HGB BLD-MCNC: 8.5 G/DL (ref 11.5–16)
HGB BLD-MCNC: 8.5 G/DL (ref 11.5–16)
HGB BLD-MCNC: 8.6 G/DL (ref 11.5–16)
HGB BLD-MCNC: 8.7 G/DL (ref 11.5–16)
HGB BLD-MCNC: 8.7 G/DL (ref 11.5–16)
HGB BLD-MCNC: 8.8 G/DL (ref 11.5–16)
HGB BLD-MCNC: 8.8 G/DL (ref 11.5–16)
HGB BLD-MCNC: 9 G/DL (ref 11.5–16)
HGB BLD-MCNC: 9 G/DL (ref 11.5–16)
HGB BLD-MCNC: 9.1 G/DL (ref 11.1–15.9)
HGB BLD-MCNC: 9.2 G/DL (ref 11.5–16)
HGB BLD-MCNC: 9.3 G/DL (ref 11.5–16)
HGB BLD-MCNC: 9.6 G/DL (ref 11.5–16)
HGB BLD-MCNC: 9.9 G/DL (ref 11.5–16)
HGB UR QL STRIP: NEGATIVE
HIV 1+2 AB+HIV1 P24 AG SERPL QL IA: NONREACTIVE
HIV12 RESULT COMMENT, HHIVC: NORMAL
HYALINE CASTS URNS QL MICRO: ABNORMAL /LPF (ref 0–5)
IMM GRANULOCYTES # BLD AUTO: 0 K/UL (ref 0–0.04)
IMM GRANULOCYTES # BLD AUTO: 0 X10E3/UL (ref 0–0.1)
IMM GRANULOCYTES # BLD AUTO: 0 X10E3/UL (ref 0–0.1)
IMM GRANULOCYTES # BLD AUTO: 0.1 K/UL (ref 0–0.04)
IMM GRANULOCYTES NFR BLD AUTO: 0 %
IMM GRANULOCYTES NFR BLD AUTO: 0 %
IMM GRANULOCYTES NFR BLD AUTO: 0 % (ref 0–0.5)
IMM GRANULOCYTES NFR BLD AUTO: 1 % (ref 0–0.5)
IMM GRANULOCYTES NFR BLD AUTO: 2 % (ref 0–0.5)
INR BLD: 1.4 (ref 0.9–1.2)
INR PPP: 1.1 (ref 0.9–1.1)
INR PPP: 1.2 (ref 0.9–1.1)
INR PPP: 1.2 (ref 0.9–1.2)
INR PPP: 1.3 (ref 0.9–1.1)
INR PPP: 1.4 (ref 0.9–1.1)
INR PPP: 1.5 (ref 0.9–1.1)
INR PPP: 1.6 (ref 0.9–1.1)
INR PPP: 1.7 (ref 0.9–1.1)
INR PPP: 1.7 (ref 0.9–1.1)
INR PPP: 1.8 (ref 0.9–1.1)
INR PPP: 1.9 (ref 0.9–1.1)
INR PPP: 1.9 (ref 0.9–1.1)
INR PPP: 1.9 (ref 0.9–1.2)
INR PPP: 2 (ref 0.9–1.1)
INR PPP: 2.2 (ref 0.9–1.1)
INR PPP: 2.3 (ref 0.9–1.1)
INR PPP: 2.3 (ref 0.9–1.1)
INR PPP: 2.4 (ref 0.9–1.1)
INR PPP: 2.5 (ref 0.9–1.1)
INR PPP: 2.5 (ref 0.9–1.1)
INR PPP: 2.6 (ref 0.9–1.1)
INR PPP: 2.6 (ref 0.9–1.1)
INR PPP: 2.7 (ref 0.9–1.1)
INR PPP: 2.9 (ref 0.9–1.1)
INR PPP: 3 (ref 0.9–1.1)
INR PPP: 3.1 (ref 0.9–1.1)
INTERPRETATION: NORMAL
IRON SATN MFR SERPL: 3 % (ref 20–50)
IRON SATN MFR SERPL: 7 % (ref 20–50)
IRON SERPL-MCNC: 10 UG/DL (ref 35–150)
IRON SERPL-MCNC: 17 UG/DL (ref 35–150)
KETONES P FAST UR STRIP-MCNC: NEGATIVE MG/DL
KETONES UR QL STRIP.AUTO: NEGATIVE MG/DL
KETONES UR QL STRIP: NEGATIVE
L PNEUMO1 AG UR QL IA: NEGATIVE
LACTATE BLD-SCNC: 0.97 MMOL/L (ref 0.4–2)
LDLC SERPL CALC-MCNC: 21 MG/DL (ref 0–99)
LEFT ARM BP: 158 MMHG
LEFT TBI: 0.86
LEFT TOE PRESSURE: 136 MMHG
LEUKOCYTE ESTERASE UR QL STRIP.AUTO: ABNORMAL
LEUKOCYTE ESTERASE UR QL STRIP.AUTO: NEGATIVE
LEUKOCYTE ESTERASE UR QL STRIP.AUTO: NEGATIVE
LEUKOCYTE ESTERASE UR QL STRIP: NEGATIVE
LIPASE SERPL-CCNC: 133 U/L (ref 73–393)
LYMPHOCYTES # BLD AUTO: 0.8 X10E3/UL (ref 0.7–3.1)
LYMPHOCYTES # BLD AUTO: 1 X10E3/UL (ref 0.7–3.1)
LYMPHOCYTES # BLD: 0.4 K/UL (ref 0.8–3.5)
LYMPHOCYTES # BLD: 0.6 K/UL (ref 0.8–3.5)
LYMPHOCYTES # BLD: 0.7 K/UL (ref 0.8–3.5)
LYMPHOCYTES # BLD: 0.7 K/UL (ref 0.8–3.5)
LYMPHOCYTES # BLD: 0.8 K/UL (ref 0.8–3.5)
LYMPHOCYTES # BLD: 0.9 K/UL (ref 0.8–3.5)
LYMPHOCYTES # BLD: 1 K/UL (ref 0.8–3.5)
LYMPHOCYTES # BLD: 1.3 K/UL (ref 0.8–3.5)
LYMPHOCYTES # BLD: 1.4 K/UL (ref 0.8–3.5)
LYMPHOCYTES # BLD: 1.5 K/UL (ref 0.8–3.5)
LYMPHOCYTES # BLD: 1.6 K/UL (ref 0.8–3.5)
LYMPHOCYTES NFR BLD AUTO: 15 %
LYMPHOCYTES NFR BLD AUTO: 20 %
LYMPHOCYTES NFR BLD: 10 % (ref 12–49)
LYMPHOCYTES NFR BLD: 11 % (ref 12–49)
LYMPHOCYTES NFR BLD: 12 % (ref 12–49)
LYMPHOCYTES NFR BLD: 13 % (ref 12–49)
LYMPHOCYTES NFR BLD: 14 % (ref 12–49)
LYMPHOCYTES NFR BLD: 16 % (ref 12–49)
LYMPHOCYTES NFR BLD: 18 % (ref 12–49)
LYMPHOCYTES NFR BLD: 18 % (ref 12–49)
LYMPHOCYTES NFR BLD: 19 % (ref 12–49)
LYMPHOCYTES NFR BLD: 20 % (ref 12–49)
LYMPHOCYTES NFR BLD: 20 % (ref 12–49)
LYMPHOCYTES NFR BLD: 23 % (ref 12–49)
LYMPHOCYTES NFR BLD: 29 % (ref 12–49)
LYMPHOCYTES NFR BLD: 3 % (ref 12–49)
LYMPHOCYTES NFR BLD: 7 % (ref 12–49)
LYMPHOCYTES NFR BLD: 7 % (ref 12–49)
LYMPHOCYTES NFR BLD: 9 % (ref 12–49)
MAGNESIUM SERPL-MCNC: 2 MG/DL (ref 1.6–2.4)
MAGNESIUM SERPL-MCNC: 2.1 MG/DL (ref 1.6–2.4)
MAGNESIUM SERPL-MCNC: 2.2 MG/DL (ref 1.6–2.4)
MAGNESIUM SERPL-MCNC: 2.2 MG/DL (ref 1.6–2.4)
MAGNESIUM SERPL-MCNC: 2.3 MG/DL (ref 1.6–2.4)
MAGNESIUM SERPL-MCNC: 2.3 MG/DL (ref 1.6–2.4)
MAGNESIUM SERPL-MCNC: 2.4 MG/DL (ref 1.6–2.4)
MAGNESIUM SERPL-MCNC: 2.4 MG/DL (ref 1.6–2.4)
MAGNESIUM SERPL-MCNC: 2.5 MG/DL (ref 1.6–2.3)
MAGNESIUM SERPL-MCNC: 2.6 MG/DL (ref 1.6–2.4)
MCH RBC QN AUTO: 22.4 PG (ref 26–34)
MCH RBC QN AUTO: 22.6 PG (ref 26–34)
MCH RBC QN AUTO: 22.7 PG (ref 26.6–33)
MCH RBC QN AUTO: 22.8 PG (ref 26–34)
MCH RBC QN AUTO: 22.9 PG (ref 26–34)
MCH RBC QN AUTO: 23.3 PG (ref 26–34)
MCH RBC QN AUTO: 23.5 PG (ref 26–34)
MCH RBC QN AUTO: 23.8 PG (ref 26–34)
MCH RBC QN AUTO: 24.1 PG (ref 26–34)
MCH RBC QN AUTO: 24.6 PG (ref 26–34)
MCH RBC QN AUTO: 26 PG (ref 26.6–33)
MCH RBC QN AUTO: 26.2 PG (ref 26–34)
MCH RBC QN AUTO: 27.9 PG (ref 26–34)
MCH RBC QN AUTO: 28.1 PG (ref 26–34)
MCH RBC QN AUTO: 28.1 PG (ref 26–34)
MCH RBC QN AUTO: 28.2 PG (ref 26–34)
MCH RBC QN AUTO: 28.5 PG (ref 26–34)
MCH RBC QN AUTO: 28.5 PG (ref 26–34)
MCH RBC QN AUTO: 28.6 PG (ref 26–34)
MCH RBC QN AUTO: 28.6 PG (ref 26–34)
MCH RBC QN AUTO: 28.7 PG (ref 26–34)
MCH RBC QN AUTO: 28.8 PG (ref 26–34)
MCH RBC QN AUTO: 28.9 PG (ref 26–34)
MCH RBC QN AUTO: 28.9 PG (ref 26–34)
MCH RBC QN AUTO: 29 PG (ref 26–34)
MCH RBC QN AUTO: 29 PG (ref 26–34)
MCH RBC QN AUTO: 29.1 PG (ref 26–34)
MCH RBC QN AUTO: 29.2 PG (ref 26–34)
MCH RBC QN AUTO: 29.3 PG (ref 26–34)
MCHC RBC AUTO-ENTMCNC: 29 G/DL (ref 30–36.5)
MCHC RBC AUTO-ENTMCNC: 29 G/DL (ref 30–36.5)
MCHC RBC AUTO-ENTMCNC: 29 G/DL (ref 31.5–35.7)
MCHC RBC AUTO-ENTMCNC: 29.1 G/DL (ref 30–36.5)
MCHC RBC AUTO-ENTMCNC: 29.3 G/DL (ref 30–36.5)
MCHC RBC AUTO-ENTMCNC: 29.3 G/DL (ref 30–36.5)
MCHC RBC AUTO-ENTMCNC: 29.4 G/DL (ref 30–36.5)
MCHC RBC AUTO-ENTMCNC: 29.4 G/DL (ref 30–36.5)
MCHC RBC AUTO-ENTMCNC: 29.7 G/DL (ref 30–36.5)
MCHC RBC AUTO-ENTMCNC: 29.8 G/DL (ref 30–36.5)
MCHC RBC AUTO-ENTMCNC: 29.9 G/DL (ref 30–36.5)
MCHC RBC AUTO-ENTMCNC: 30.1 G/DL (ref 30–36.5)
MCHC RBC AUTO-ENTMCNC: 30.2 G/DL (ref 30–36.5)
MCHC RBC AUTO-ENTMCNC: 30.3 G/DL (ref 30–36.5)
MCHC RBC AUTO-ENTMCNC: 30.3 G/DL (ref 31.5–35.7)
MCHC RBC AUTO-ENTMCNC: 30.4 G/DL (ref 30–36.5)
MCHC RBC AUTO-ENTMCNC: 30.4 G/DL (ref 30–36.5)
MCHC RBC AUTO-ENTMCNC: 30.5 G/DL (ref 30–36.5)
MCHC RBC AUTO-ENTMCNC: 30.7 G/DL (ref 30–36.5)
MCHC RBC AUTO-ENTMCNC: 31 G/DL (ref 30–36.5)
MCHC RBC AUTO-ENTMCNC: 31.1 G/DL (ref 30–36.5)
MCHC RBC AUTO-ENTMCNC: 31.2 G/DL (ref 30–36.5)
MCHC RBC AUTO-ENTMCNC: 31.2 G/DL (ref 30–36.5)
MCHC RBC AUTO-ENTMCNC: 31.3 G/DL (ref 30–36.5)
MCHC RBC AUTO-ENTMCNC: 31.6 G/DL (ref 30–36.5)
MCHC RBC AUTO-ENTMCNC: 31.7 G/DL (ref 30–36.5)
MCV RBC AUTO: 77.3 FL (ref 80–99)
MCV RBC AUTO: 77.4 FL (ref 80–99)
MCV RBC AUTO: 78 FL (ref 79–97)
MCV RBC AUTO: 78 FL (ref 80–99)
MCV RBC AUTO: 78.3 FL (ref 80–99)
MCV RBC AUTO: 78.9 FL (ref 80–99)
MCV RBC AUTO: 79 FL (ref 80–99)
MCV RBC AUTO: 81.5 FL (ref 80–99)
MCV RBC AUTO: 82.2 FL (ref 80–99)
MCV RBC AUTO: 82.5 FL (ref 80–99)
MCV RBC AUTO: 86 FL (ref 79–97)
MCV RBC AUTO: 89.5 FL (ref 80–99)
MCV RBC AUTO: 90.5 FL (ref 80–99)
MCV RBC AUTO: 91.4 FL (ref 80–99)
MCV RBC AUTO: 92.1 FL (ref 80–99)
MCV RBC AUTO: 92.2 FL (ref 80–99)
MCV RBC AUTO: 92.4 FL (ref 80–99)
MCV RBC AUTO: 92.4 FL (ref 80–99)
MCV RBC AUTO: 92.7 FL (ref 80–99)
MCV RBC AUTO: 92.9 FL (ref 80–99)
MCV RBC AUTO: 93 FL (ref 80–99)
MCV RBC AUTO: 93 FL (ref 80–99)
MCV RBC AUTO: 93.5 FL (ref 80–99)
MCV RBC AUTO: 93.9 FL (ref 80–99)
MCV RBC AUTO: 94.4 FL (ref 80–99)
MCV RBC AUTO: 94.6 FL (ref 80–99)
MCV RBC AUTO: 94.8 FL (ref 80–99)
MCV RBC AUTO: 95.1 FL (ref 80–99)
MCV RBC AUTO: 95.3 FL (ref 80–99)
MCV RBC AUTO: 95.4 FL (ref 80–99)
MCV RBC AUTO: 95.5 FL (ref 80–99)
MCV RBC AUTO: 95.5 FL (ref 80–99)
MCV RBC AUTO: 96.2 FL (ref 80–99)
MCV RBC AUTO: 96.3 FL (ref 80–99)
MCV RBC AUTO: 96.6 FL (ref 80–99)
MCV RBC AUTO: 97.2 FL (ref 80–99)
MCV RBC AUTO: 97.5 FL (ref 80–99)
MCV RBC AUTO: 97.9 FL (ref 80–99)
MICRO URNS: ABNORMAL
MICROALBUMIN UR-MCNC: 714.5 UG/ML
MONOCYTES # BLD AUTO: 0.5 X10E3/UL (ref 0.1–0.9)
MONOCYTES # BLD AUTO: 0.8 X10E3/UL (ref 0.1–0.9)
MONOCYTES # BLD: 0.5 K/UL (ref 0–1)
MONOCYTES # BLD: 0.6 K/UL (ref 0–1)
MONOCYTES # BLD: 0.6 K/UL (ref 0–1)
MONOCYTES # BLD: 0.7 K/UL (ref 0–1)
MONOCYTES # BLD: 0.8 K/UL (ref 0–1)
MONOCYTES # BLD: 0.9 K/UL (ref 0–1)
MONOCYTES # BLD: 1 K/UL (ref 0–1)
MONOCYTES # BLD: 1.1 K/UL (ref 0–1)
MONOCYTES # BLD: 1.2 K/UL (ref 0–1)
MONOCYTES # BLD: 1.2 K/UL (ref 0–1)
MONOCYTES NFR BLD AUTO: 10 %
MONOCYTES NFR BLD AUTO: 15 %
MONOCYTES NFR BLD: 10 % (ref 5–13)
MONOCYTES NFR BLD: 11 % (ref 5–13)
MONOCYTES NFR BLD: 12 % (ref 5–13)
MONOCYTES NFR BLD: 12 % (ref 5–13)
MONOCYTES NFR BLD: 14 % (ref 5–13)
MONOCYTES NFR BLD: 17 % (ref 5–13)
MONOCYTES NFR BLD: 18 % (ref 5–13)
MONOCYTES NFR BLD: 18 % (ref 5–13)
MONOCYTES NFR BLD: 7 % (ref 5–13)
MONOCYTES NFR BLD: 8 % (ref 5–13)
MONOCYTES NFR BLD: 9 % (ref 5–13)
MONOCYTES NFR BLD: 9 % (ref 5–13)
MUCOUS THREADS URNS QL MICRO: PRESENT
NEUTROPHILS # BLD AUTO: 3.3 X10E3/UL (ref 1.4–7)
NEUTROPHILS # BLD AUTO: 3.7 X10E3/UL (ref 1.4–7)
NEUTROPHILS NFR BLD AUTO: 66 %
NEUTROPHILS NFR BLD AUTO: 66 %
NEUTS SEG # BLD: 10.9 K/UL (ref 1.8–8)
NEUTS SEG # BLD: 12.5 K/UL (ref 1.8–8)
NEUTS SEG # BLD: 13.2 K/UL (ref 1.8–8)
NEUTS SEG # BLD: 3.2 K/UL (ref 1.8–8)
NEUTS SEG # BLD: 3.3 K/UL (ref 1.8–8)
NEUTS SEG # BLD: 3.4 K/UL (ref 1.8–8)
NEUTS SEG # BLD: 3.6 K/UL (ref 1.8–8)
NEUTS SEG # BLD: 3.6 K/UL (ref 1.8–8)
NEUTS SEG # BLD: 3.8 K/UL (ref 1.8–8)
NEUTS SEG # BLD: 4.1 K/UL (ref 1.8–8)
NEUTS SEG # BLD: 4.2 K/UL (ref 1.8–8)
NEUTS SEG # BLD: 4.2 K/UL (ref 1.8–8)
NEUTS SEG # BLD: 4.4 K/UL (ref 1.8–8)
NEUTS SEG # BLD: 4.7 K/UL (ref 1.8–8)
NEUTS SEG # BLD: 4.8 K/UL (ref 1.8–8)
NEUTS SEG # BLD: 5 K/UL (ref 1.8–8)
NEUTS SEG # BLD: 5.2 K/UL (ref 1.8–8)
NEUTS SEG # BLD: 6 K/UL (ref 1.8–8)
NEUTS SEG # BLD: 6.1 K/UL (ref 1.8–8)
NEUTS SEG # BLD: 6.6 K/UL (ref 1.8–8)
NEUTS SEG # BLD: 6.7 K/UL (ref 1.8–8)
NEUTS SEG # BLD: 6.8 K/UL (ref 1.8–8)
NEUTS SEG # BLD: 7.2 K/UL (ref 1.8–8)
NEUTS SEG # BLD: 7.3 K/UL (ref 1.8–8)
NEUTS SEG # BLD: 7.4 K/UL (ref 1.8–8)
NEUTS SEG NFR BLD: 55 % (ref 32–75)
NEUTS SEG NFR BLD: 57 % (ref 32–75)
NEUTS SEG NFR BLD: 60 % (ref 32–75)
NEUTS SEG NFR BLD: 62 % (ref 32–75)
NEUTS SEG NFR BLD: 62 % (ref 32–75)
NEUTS SEG NFR BLD: 64 % (ref 32–75)
NEUTS SEG NFR BLD: 65 % (ref 32–75)
NEUTS SEG NFR BLD: 67 % (ref 32–75)
NEUTS SEG NFR BLD: 67 % (ref 32–75)
NEUTS SEG NFR BLD: 68 % (ref 32–75)
NEUTS SEG NFR BLD: 70 % (ref 32–75)
NEUTS SEG NFR BLD: 71 % (ref 32–75)
NEUTS SEG NFR BLD: 71 % (ref 32–75)
NEUTS SEG NFR BLD: 74 % (ref 32–75)
NEUTS SEG NFR BLD: 74 % (ref 32–75)
NEUTS SEG NFR BLD: 75 % (ref 32–75)
NEUTS SEG NFR BLD: 75 % (ref 32–75)
NEUTS SEG NFR BLD: 76 % (ref 32–75)
NEUTS SEG NFR BLD: 77 % (ref 32–75)
NEUTS SEG NFR BLD: 80 % (ref 32–75)
NEUTS SEG NFR BLD: 82 % (ref 32–75)
NEUTS SEG NFR BLD: 84 % (ref 32–75)
NEUTS SEG NFR BLD: 89 % (ref 32–75)
NITRITE UR QL STRIP.AUTO: NEGATIVE
NITRITE UR QL STRIP.AUTO: NEGATIVE
NITRITE UR QL STRIP.AUTO: POSITIVE
NITRITE UR QL STRIP: NEGATIVE
NRBC # BLD: 0 K/UL (ref 0–0.01)
NRBC # BLD: 0.04 K/UL (ref 0–0.01)
NRBC # BLD: 0.07 K/UL (ref 0–0.01)
NRBC # BLD: 0.08 K/UL (ref 0–0.01)
NRBC # BLD: 0.08 K/UL (ref 0–0.01)
NRBC # BLD: 0.1 K/UL (ref 0–0.01)
NRBC # BLD: 0.13 K/UL (ref 0–0.01)
NRBC # BLD: 0.33 K/UL (ref 0–0.01)
NRBC # BLD: 0.37 K/UL (ref 0–0.01)
NRBC BLD AUTO-RTO: 2 % (ref 0–0)
NRBC BLD-RTO: 0 PER 100 WBC
NRBC BLD-RTO: 0.7 PER 100 WBC
NRBC BLD-RTO: 1.4 PER 100 WBC
NRBC BLD-RTO: 1.5 PER 100 WBC
NRBC BLD-RTO: 2 PER 100 WBC
NRBC BLD-RTO: 5.5 PER 100 WBC
NRBC BLD-RTO: 5.7 PER 100 WBC
ORGANISM ID, SPNG3: NORMAL
P-R INTERVAL, ECG05: 132 MS
PH UR STRIP: 5 [PH] (ref 4.6–8)
PH UR STRIP: 5 [PH] (ref 5–8)
PH UR STRIP: 5.5 [PH] (ref 5–7.5)
PH UR STRIP: 5.5 [PH] (ref 5–8)
PH UR STRIP: 5.5 [PH] (ref 5–8)
PH UR STRIP: 6 [PH] (ref 5–8)
PH UR STRIP: 7 [PH] (ref 5–8)
PHOSPHATE SERPL-MCNC: 3.2 MG/DL (ref 2.6–4.7)
PHOSPHATE SERPL-MCNC: 3.2 MG/DL (ref 2.6–4.7)
PHOSPHATE SERPL-MCNC: 3.4 MG/DL (ref 2.6–4.7)
PHOSPHATE SERPL-MCNC: 3.6 MG/DL (ref 2.6–4.7)
PHOSPHATE SERPL-MCNC: 3.9 MG/DL (ref 2.6–4.7)
PLATELET # BLD AUTO: 255 K/UL (ref 150–400)
PLATELET # BLD AUTO: 258 K/UL (ref 150–400)
PLATELET # BLD AUTO: 260 K/UL (ref 150–400)
PLATELET # BLD AUTO: 282 K/UL (ref 150–400)
PLATELET # BLD AUTO: 284 K/UL (ref 150–400)
PLATELET # BLD AUTO: 293 K/UL (ref 150–400)
PLATELET # BLD AUTO: 333 K/UL (ref 150–400)
PLATELET # BLD AUTO: 342 K/UL (ref 150–400)
PLATELET # BLD AUTO: 346 K/UL (ref 150–400)
PLATELET # BLD AUTO: 356 X10E3/UL (ref 150–450)
PLATELET # BLD AUTO: 359 K/UL (ref 150–400)
PLATELET # BLD AUTO: 360 X10E3/UL (ref 150–450)
PLATELET # BLD AUTO: 367 K/UL (ref 150–400)
PLATELET # BLD AUTO: 397 K/UL (ref 150–400)
PLATELET # BLD AUTO: 398 K/UL (ref 150–400)
PLATELET # BLD AUTO: 407 K/UL (ref 150–400)
PLATELET # BLD AUTO: 417 K/UL (ref 150–400)
PLATELET # BLD AUTO: 422 K/UL (ref 150–400)
PLATELET # BLD AUTO: 427 K/UL (ref 150–400)
PLATELET # BLD AUTO: 438 K/UL (ref 150–400)
PLATELET # BLD AUTO: 442 K/UL (ref 150–400)
PLATELET # BLD AUTO: 457 K/UL (ref 150–400)
PLATELET # BLD AUTO: 462 K/UL (ref 150–400)
PLATELET # BLD AUTO: 462 K/UL (ref 150–400)
PLATELET # BLD AUTO: 466 K/UL (ref 150–400)
PLATELET # BLD AUTO: 468 K/UL (ref 150–400)
PLATELET # BLD AUTO: 482 K/UL (ref 150–400)
PLATELET # BLD AUTO: 483 K/UL (ref 150–400)
PLATELET # BLD AUTO: 505 K/UL (ref 150–400)
PLATELET # BLD AUTO: 506 K/UL (ref 150–400)
PLATELET # BLD AUTO: 510 K/UL (ref 150–400)
PLATELET # BLD AUTO: 516 K/UL (ref 150–400)
PLATELET # BLD AUTO: 524 K/UL (ref 150–400)
PLATELET # BLD AUTO: 528 K/UL (ref 150–400)
PLATELET # BLD AUTO: 528 K/UL (ref 150–400)
PLATELET # BLD AUTO: 581 K/UL (ref 150–400)
PLEASE NOTE, SPNG4: NORMAL
PMV BLD AUTO: 10 FL (ref 8.9–12.9)
PMV BLD AUTO: 10.1 FL (ref 8.9–12.9)
PMV BLD AUTO: 10.2 FL (ref 8.9–12.9)
PMV BLD AUTO: 10.3 FL (ref 8.9–12.9)
PMV BLD AUTO: 10.4 FL (ref 8.9–12.9)
PMV BLD AUTO: 10.5 FL (ref 8.9–12.9)
PMV BLD AUTO: 10.6 FL (ref 8.9–12.9)
PMV BLD AUTO: 10.7 FL (ref 8.9–12.9)
PMV BLD AUTO: 10.8 FL (ref 8.9–12.9)
PMV BLD AUTO: 10.9 FL (ref 8.9–12.9)
PMV BLD AUTO: 11 FL (ref 8.9–12.9)
PMV BLD AUTO: 11.2 FL (ref 8.9–12.9)
PMV BLD AUTO: 11.2 FL (ref 8.9–12.9)
PMV BLD AUTO: 11.3 FL (ref 8.9–12.9)
PMV BLD AUTO: 9.3 FL (ref 8.9–12.9)
PMV BLD AUTO: 9.5 FL (ref 8.9–12.9)
PMV BLD AUTO: 9.7 FL (ref 8.9–12.9)
PMV BLD AUTO: 9.8 FL (ref 8.9–12.9)
PMV BLD AUTO: 9.8 FL (ref 8.9–12.9)
PMV BLD AUTO: 9.9 FL (ref 8.9–12.9)
POTASSIUM SERPL-SCNC: 2.9 MMOL/L (ref 3.5–5.1)
POTASSIUM SERPL-SCNC: 2.9 MMOL/L (ref 3.5–5.1)
POTASSIUM SERPL-SCNC: 3.1 MMOL/L (ref 3.5–5.1)
POTASSIUM SERPL-SCNC: 3.2 MMOL/L (ref 3.5–5.1)
POTASSIUM SERPL-SCNC: 3.3 MMOL/L (ref 3.5–5.1)
POTASSIUM SERPL-SCNC: 3.3 MMOL/L (ref 3.5–5.1)
POTASSIUM SERPL-SCNC: 3.4 MMOL/L (ref 3.5–5.1)
POTASSIUM SERPL-SCNC: 3.5 MMOL/L (ref 3.5–5.1)
POTASSIUM SERPL-SCNC: 3.6 MMOL/L (ref 3.5–5.1)
POTASSIUM SERPL-SCNC: 3.7 MMOL/L (ref 3.5–5.1)
POTASSIUM SERPL-SCNC: 3.8 MMOL/L (ref 3.5–5.1)
POTASSIUM SERPL-SCNC: 3.9 MMOL/L (ref 3.5–5.1)
POTASSIUM SERPL-SCNC: 4 MMOL/L (ref 3.5–5.1)
POTASSIUM SERPL-SCNC: 4.1 MMOL/L (ref 3.5–5.1)
POTASSIUM SERPL-SCNC: 4.2 MMOL/L (ref 3.5–5.1)
POTASSIUM SERPL-SCNC: 4.3 MMOL/L (ref 3.5–5.1)
POTASSIUM SERPL-SCNC: 4.4 MMOL/L (ref 3.5–5.1)
POTASSIUM SERPL-SCNC: 4.5 MMOL/L (ref 3.5–5.1)
POTASSIUM SERPL-SCNC: 4.6 MMOL/L (ref 3.5–5.2)
POTASSIUM SERPL-SCNC: 4.7 MMOL/L (ref 3.5–5.1)
POTASSIUM SERPL-SCNC: 4.8 MMOL/L (ref 3.5–5.1)
POTASSIUM SERPL-SCNC: 4.8 MMOL/L (ref 3.5–5.1)
POTASSIUM SERPL-SCNC: 4.8 MMOL/L (ref 3.5–5.2)
POTASSIUM SERPL-SCNC: 5 MMOL/L (ref 3.5–5.1)
PROCALCITONIN SERPL-MCNC: 0.14 NG/ML
PROT SERPL-MCNC: 5.8 G/DL (ref 6.4–8.2)
PROT SERPL-MCNC: 6.1 G/DL (ref 6.4–8.2)
PROT SERPL-MCNC: 6.3 G/DL (ref 6.4–8.2)
PROT SERPL-MCNC: 6.3 G/DL (ref 6.4–8.2)
PROT SERPL-MCNC: 6.4 G/DL (ref 6.4–8.2)
PROT SERPL-MCNC: 6.5 G/DL (ref 6.4–8.2)
PROT SERPL-MCNC: 6.6 G/DL (ref 6.4–8.2)
PROT SERPL-MCNC: 6.6 G/DL (ref 6.4–8.2)
PROT SERPL-MCNC: 6.7 G/DL (ref 6.4–8.2)
PROT SERPL-MCNC: 6.9 G/DL (ref 6.4–8.2)
PROT SERPL-MCNC: 7 G/DL (ref 6.4–8.2)
PROT SERPL-MCNC: 7.3 G/DL (ref 6.4–8.2)
PROT SERPL-MCNC: 7.4 G/DL (ref 6.4–8.2)
PROT SERPL-MCNC: 7.6 G/DL (ref 6.4–8.2)
PROT SERPL-MCNC: 8.2 G/DL (ref 6.4–8.2)
PROT UR QL STRIP: ABNORMAL
PROT UR QL STRIP: NORMAL
PROT UR STRIP-MCNC: 100 MG/DL
PROT UR STRIP-MCNC: ABNORMAL MG/DL
PROT UR STRIP-MCNC: NEGATIVE MG/DL
PROTHROMBIN TIME: 11 SEC (ref 9–11.1)
PROTHROMBIN TIME: 11.1 SEC (ref 9–11.1)
PROTHROMBIN TIME: 11.4 SEC (ref 9–11.1)
PROTHROMBIN TIME: 11.5 SEC (ref 9–11.1)
PROTHROMBIN TIME: 11.7 SEC (ref 9–11.1)
PROTHROMBIN TIME: 11.7 SEC (ref 9–11.1)
PROTHROMBIN TIME: 11.9 SEC (ref 9–11.1)
PROTHROMBIN TIME: 12.2 SEC (ref 9–11.1)
PROTHROMBIN TIME: 12.5 SEC (ref 9–11.1)
PROTHROMBIN TIME: 12.5 SEC (ref 9–11.1)
PROTHROMBIN TIME: 12.6 SEC (ref 9–11.1)
PROTHROMBIN TIME: 12.9 SEC (ref 9–11.1)
PROTHROMBIN TIME: 13 SEC (ref 9–11.1)
PROTHROMBIN TIME: 13 SEC (ref 9–11.1)
PROTHROMBIN TIME: 13.1 SEC (ref 9.1–12)
PROTHROMBIN TIME: 13.8 SEC (ref 9–11.1)
PROTHROMBIN TIME: 13.8 SEC (ref 9–11.1)
PROTHROMBIN TIME: 14.3 SEC (ref 9–11.1)
PROTHROMBIN TIME: 14.4 SEC (ref 9–11.1)
PROTHROMBIN TIME: 14.4 SEC (ref 9–11.1)
PROTHROMBIN TIME: 15 SEC (ref 9–11.1)
PROTHROMBIN TIME: 15.5 SEC (ref 9–11.1)
PROTHROMBIN TIME: 15.6 SEC (ref 9–11.1)
PROTHROMBIN TIME: 15.6 SEC (ref 9–11.1)
PROTHROMBIN TIME: 15.9 SEC (ref 9–11.1)
PROTHROMBIN TIME: 15.9 SEC (ref 9–11.1)
PROTHROMBIN TIME: 16 SEC (ref 9–11.1)
PROTHROMBIN TIME: 16.7 SEC (ref 9–11.1)
PROTHROMBIN TIME: 16.8 SEC (ref 9–11.1)
PROTHROMBIN TIME: 17.2 SEC (ref 9–11.1)
PROTHROMBIN TIME: 17.3 SEC (ref 9–11.1)
PROTHROMBIN TIME: 17.5 SEC (ref 9–11.1)
PROTHROMBIN TIME: 18.2 SEC (ref 9–11.1)
PROTHROMBIN TIME: 18.2 SEC (ref 9–11.1)
PROTHROMBIN TIME: 19 SEC (ref 9–11.1)
PROTHROMBIN TIME: 19.4 SEC (ref 9–11.1)
PROTHROMBIN TIME: 19.7 SEC (ref 9.1–12)
PROTHROMBIN TIME: 19.8 SEC (ref 9–11.1)
PROTHROMBIN TIME: 21.3 SEC (ref 9–11.1)
PROTHROMBIN TIME: 21.9 SEC (ref 9–11.1)
PROTHROMBIN TIME: 22.6 SEC (ref 9–11.1)
PROTHROMBIN TIME: 22.9 SEC (ref 9–11.1)
PROTHROMBIN TIME: 24.2 SEC (ref 9–11.1)
PROTHROMBIN TIME: 24.5 SEC (ref 9–11.1)
PROTHROMBIN TIME: 24.7 SEC (ref 9–11.1)
PROTHROMBIN TIME: 24.9 SEC (ref 9–11.1)
PROTHROMBIN TIME: 26 SEC (ref 9–11.1)
PROTHROMBIN TIME: 28.2 SEC (ref 9–11.1)
PROTHROMBIN TIME: 28.5 SEC (ref 9–11.1)
PROTHROMBIN TIME: 29.4 SEC (ref 9–11.1)
Q-T INTERVAL, ECG07: 340 MS
Q-T INTERVAL, ECG07: 352 MS
Q-T INTERVAL, ECG07: 438 MS
Q-T INTERVAL, ECG07: 438 MS
Q-T INTERVAL, ECG07: 446 MS
Q-T INTERVAL, ECG07: 450 MS
Q-T INTERVAL, ECG07: 518 MS
Q-T INTERVAL, ECG07: 522 MS
QRS DURATION, ECG06: 112 MS
QRS DURATION, ECG06: 112 MS
QRS DURATION, ECG06: 116 MS
QRS DURATION, ECG06: 116 MS
QRS DURATION, ECG06: 158 MS
QRS DURATION, ECG06: 164 MS
QRS DURATION, ECG06: 166 MS
QRS DURATION, ECG06: 166 MS
QTC CALCULATION (BEZET), ECG08: 458 MS
QTC CALCULATION (BEZET), ECG08: 466 MS
QTC CALCULATION (BEZET), ECG08: 499 MS
QTC CALCULATION (BEZET), ECG08: 523 MS
QTC CALCULATION (BEZET), ECG08: 535 MS
QTC CALCULATION (BEZET), ECG08: 535 MS
QTC CALCULATION (BEZET), ECG08: 545 MS
QTC CALCULATION (BEZET), ECG08: 553 MS
RBC # BLD AUTO: 2.43 M/UL (ref 3.8–5.2)
RBC # BLD AUTO: 2.65 M/UL (ref 3.8–5.2)
RBC # BLD AUTO: 2.66 M/UL (ref 3.8–5.2)
RBC # BLD AUTO: 2.72 M/UL (ref 3.8–5.2)
RBC # BLD AUTO: 2.75 M/UL (ref 3.8–5.2)
RBC # BLD AUTO: 2.77 M/UL (ref 3.8–5.2)
RBC # BLD AUTO: 2.78 M/UL (ref 3.8–5.2)
RBC # BLD AUTO: 2.8 M/UL (ref 3.8–5.2)
RBC # BLD AUTO: 2.88 M/UL (ref 3.8–5.2)
RBC # BLD AUTO: 2.91 M/UL (ref 3.8–5.2)
RBC # BLD AUTO: 2.95 M/UL (ref 3.8–5.2)
RBC # BLD AUTO: 2.96 M/UL (ref 3.8–5.2)
RBC # BLD AUTO: 3.02 M/UL (ref 3.8–5.2)
RBC # BLD AUTO: 3.14 M/UL (ref 3.8–5.2)
RBC # BLD AUTO: 3.14 M/UL (ref 3.8–5.2)
RBC # BLD AUTO: 3.2 M/UL (ref 3.8–5.2)
RBC # BLD AUTO: 3.22 M/UL (ref 3.8–5.2)
RBC # BLD AUTO: 3.23 M/UL (ref 3.8–5.2)
RBC # BLD AUTO: 3.3 M/UL (ref 3.8–5.2)
RBC # BLD AUTO: 3.32 M/UL (ref 3.8–5.2)
RBC # BLD AUTO: 3.34 M/UL (ref 3.8–5.2)
RBC # BLD AUTO: 3.37 M/UL (ref 3.8–5.2)
RBC # BLD AUTO: 3.41 M/UL (ref 3.8–5.2)
RBC # BLD AUTO: 3.5 M/UL (ref 3.8–5.2)
RBC # BLD AUTO: 3.5 X10E6/UL (ref 3.77–5.28)
RBC # BLD AUTO: 3.55 M/UL (ref 3.8–5.2)
RBC # BLD AUTO: 3.55 M/UL (ref 3.8–5.2)
RBC # BLD AUTO: 3.57 M/UL (ref 3.8–5.2)
RBC # BLD AUTO: 3.65 X10E6/UL (ref 3.77–5.28)
RBC # BLD AUTO: 3.78 M/UL (ref 3.8–5.2)
RBC # BLD AUTO: 3.82 M/UL (ref 3.8–5.2)
RBC # BLD AUTO: 3.92 M/UL (ref 3.8–5.2)
RBC # BLD AUTO: 3.99 M/UL (ref 3.8–5.2)
RBC # BLD AUTO: 4.1 M/UL (ref 3.8–5.2)
RBC #/AREA URNS HPF: ABNORMAL /HPF (ref 0–5)
RBC #/AREA URNS HPF: NORMAL /HPF (ref 0–2)
RBC MORPH BLD: ABNORMAL
REPORTED DOSE,DOSE: ABNORMAL UNITS
REPORTED DOSE/TIME,TMG: ABNORMAL
RIGHT CFA PROX SYS PSV: 97.2 CM/S
RIGHT PROX PFA A PSV: 54.1 CM/S
RIGHT SFA PROX VEL RATIO: 0.5
RIGHT SUPER FEMORAL DIST SYS PSV: 24.2 CM/S
RIGHT SUPER FEMORAL PROX SYS PSV: 49.7 CM/S
RIGHT TBI: 0.87
RIGHT TOE PRESSURE: 138 MMHG
S PNEUM AG SPEC QL LA: NEGATIVE
SAMPLES BEING HELD,HOLD: NORMAL
SARS-COV-2, COV2: ABNORMAL
SARS-COV-2, COV2: NOT DETECTED
SARS-COV-2, COV2: NOT DETECTED
SARS-COV-2, COV2NT: NOT DETECTED
SARS-COV-2, COV2NT: NOT DETECTED
SERVICE CMNT-IMP: ABNORMAL
SERVICE CMNT-IMP: NORMAL
SODIUM SERPL-SCNC: 134 MMOL/L (ref 136–145)
SODIUM SERPL-SCNC: 135 MMOL/L (ref 134–144)
SODIUM SERPL-SCNC: 135 MMOL/L (ref 136–145)
SODIUM SERPL-SCNC: 136 MMOL/L (ref 136–145)
SODIUM SERPL-SCNC: 137 MMOL/L (ref 136–145)
SODIUM SERPL-SCNC: 138 MMOL/L (ref 136–145)
SODIUM SERPL-SCNC: 139 MMOL/L (ref 136–145)
SODIUM SERPL-SCNC: 140 MMOL/L (ref 136–145)
SODIUM SERPL-SCNC: 141 MMOL/L (ref 136–145)
SODIUM SERPL-SCNC: 141 MMOL/L (ref 136–145)
SODIUM SERPL-SCNC: 142 MMOL/L (ref 134–144)
SODIUM SERPL-SCNC: 142 MMOL/L (ref 136–145)
SODIUM SERPL-SCNC: 143 MMOL/L (ref 136–145)
SODIUM SERPL-SCNC: 144 MMOL/L (ref 136–145)
SOURCE, COVRS: ABNORMAL
SOURCE, COVRS: NORMAL
SP GR UR REFRACTOMETRY: 1 (ref 1–1.03)
SP GR UR REFRACTOMETRY: 1.01 (ref 1–1.03)
SP GR UR REFRACTOMETRY: 1.01 (ref 1–1.03)
SP GR UR REFRACTOMETRY: 1.02 (ref 1–1.03)
SP GR UR REFRACTOMETRY: 1.02 (ref 1–1.03)
SP GR UR STRIP: 1.01 (ref 1–1.03)
SP GR UR: 1.01 (ref 1–1.03)
SPECIMEN EXP DATE BLD: NORMAL
SPECIMEN EXP DATE BLD: NORMAL
SPECIMEN SOURCE, FCOV2M: ABNORMAL
SPECIMEN SOURCE, FCOV2M: NORMAL
SPECIMEN SOURCE: NORMAL
SPECIMEN SOURCE: NORMAL
SPECIMEN STATUS REPORT, ROLRST: NORMAL
SPECIMEN TYPE, XMCV1T: ABNORMAL
SPECIMEN TYPE, XMCV1T: NORMAL
SPECIMEN, SPNG1: NORMAL
STATUS OF UNIT,%ST: NORMAL
STATUS OF UNIT,%ST: NORMAL
THERAPEUTIC RANGE,PTTT: ABNORMAL SECS (ref 58–77)
THERAPEUTIC RANGE,PTTT: NORMAL SECS (ref 58–77)
TIBC SERPL-MCNC: 255 UG/DL (ref 250–450)
TIBC SERPL-MCNC: 301 UG/DL (ref 250–450)
TRIGL SERPL-MCNC: 77 MG/DL (ref 0–149)
TROPONIN I SERPL-MCNC: 0.11 NG/ML
TROPONIN I SERPL-MCNC: 0.12 NG/ML
TROPONIN I SERPL-MCNC: 0.12 NG/ML
TROPONIN I SERPL-MCNC: <0.05 NG/ML
UA UROBILINOGEN AMB POC: NORMAL (ref 0.2–1)
UA: UC IF INDICATED,UAUC: ABNORMAL
UNIT DIVISION, %UDIV: 0
UNIT DIVISION, %UDIV: 0
UR CULT HOLD, URHOLD: NORMAL
URINALYSIS CLARITY POC: NORMAL
URINALYSIS COLOR POC: YELLOW
URINALYSIS REFLEX , 377201: ABNORMAL
URINE BLOOD POC: NEGATIVE
URINE LEUKOCYTES POC: NORMAL
URINE NITRITES POC: POSITIVE
UROBILINOGEN UR QL STRIP.AUTO: 0.2 EU/DL (ref 0.2–1)
UROBILINOGEN UR QL STRIP.AUTO: 0.2 EU/DL (ref 0.2–1)
UROBILINOGEN UR QL STRIP.AUTO: 1 EU/DL (ref 0.2–1)
UROBILINOGEN UR STRIP-MCNC: 0.2 MG/DL (ref 0.2–1)
VANCOMYCIN SERPL-MCNC: 14.2 UG/ML
VANCOMYCIN SERPL-MCNC: 14.8 UG/ML
VANCOMYCIN SERPL-MCNC: 18.6 UG/ML
VANCOMYCIN SERPL-MCNC: 21.6 UG/ML
VANCOMYCIN SERPL-MCNC: 22.4 UG/ML
VANCOMYCIN SERPL-MCNC: 23.7 UG/ML
VANCOMYCIN TROUGH SERPL-MCNC: 15 UG/ML (ref 5–10)
VANCOMYCIN TROUGH SERPL-MCNC: 15.1 UG/ML (ref 5–10)
VANCOMYCIN TROUGH SERPL-MCNC: 16.7 UG/ML (ref 5–10)
VANCOMYCIN TROUGH SERPL-MCNC: 17.2 UG/ML (ref 5–10)
VANCOMYCIN TROUGH SERPL-MCNC: 20.7 UG/ML (ref 5–10)
VANCOMYCIN TROUGH SERPL-MCNC: 20.8 UG/ML (ref 5–10)
VANCOMYCIN TROUGH SERPL-MCNC: 20.9 UG/ML (ref 5–10)
VANCOMYCIN TROUGH SERPL-MCNC: 21.6 UG/ML (ref 5–10)
VANCOMYCIN TROUGH SERPL-MCNC: 24 UG/ML (ref 5–10)
VANCOMYCIN TROUGH SERPL-MCNC: 25.9 UG/ML (ref 5–10)
VENTRICULAR RATE, ECG03: 121 BPM
VENTRICULAR RATE, ECG03: 142 BPM
VENTRICULAR RATE, ECG03: 47 BPM
VENTRICULAR RATE, ECG03: 48 BPM
VENTRICULAR RATE, ECG03: 90 BPM
VENTRICULAR RATE, ECG03: 91 BPM
VLDLC SERPL CALC-MCNC: 16 MG/DL (ref 5–40)
WARFARIN SERPL-MCNC: 0.2 UG/ML (ref 1–10)
WBC # BLD AUTO: 10.2 K/UL (ref 3.6–11)
WBC # BLD AUTO: 10.4 K/UL (ref 3.6–11)
WBC # BLD AUTO: 10.6 K/UL (ref 3.6–11)
WBC # BLD AUTO: 11.1 K/UL (ref 3.6–11)
WBC # BLD AUTO: 13.1 K/UL (ref 3.6–11)
WBC # BLD AUTO: 14 K/UL (ref 3.6–11)
WBC # BLD AUTO: 15.6 K/UL (ref 3.6–11)
WBC # BLD AUTO: 4.8 K/UL (ref 3.6–11)
WBC # BLD AUTO: 5.1 X10E3/UL (ref 3.4–10.8)
WBC # BLD AUTO: 5.6 X10E3/UL (ref 3.4–10.8)
WBC # BLD AUTO: 5.7 K/UL (ref 3.6–11)
WBC # BLD AUTO: 5.8 K/UL (ref 3.6–11)
WBC # BLD AUTO: 5.9 K/UL (ref 3.6–11)
WBC # BLD AUTO: 6 K/UL (ref 3.6–11)
WBC # BLD AUTO: 6.4 K/UL (ref 3.6–11)
WBC # BLD AUTO: 6.5 K/UL (ref 3.6–11)
WBC # BLD AUTO: 6.5 K/UL (ref 3.6–11)
WBC # BLD AUTO: 6.6 K/UL (ref 3.6–11)
WBC # BLD AUTO: 6.6 K/UL (ref 3.6–11)
WBC # BLD AUTO: 6.8 K/UL (ref 3.6–11)
WBC # BLD AUTO: 6.8 K/UL (ref 3.6–11)
WBC # BLD AUTO: 6.9 K/UL (ref 3.6–11)
WBC # BLD AUTO: 7.5 K/UL (ref 3.6–11)
WBC # BLD AUTO: 7.7 K/UL (ref 3.6–11)
WBC # BLD AUTO: 7.8 K/UL (ref 3.6–11)
WBC # BLD AUTO: 8.2 K/UL (ref 3.6–11)
WBC # BLD AUTO: 8.5 K/UL (ref 3.6–11)
WBC # BLD AUTO: 8.5 K/UL (ref 3.6–11)
WBC # BLD AUTO: 8.6 K/UL (ref 3.6–11)
WBC # BLD AUTO: 8.6 K/UL (ref 3.6–11)
WBC # BLD AUTO: 8.8 K/UL (ref 3.6–11)
WBC # BLD AUTO: 8.8 K/UL (ref 3.6–11)
WBC # BLD AUTO: 8.9 K/UL (ref 3.6–11)
WBC # BLD AUTO: 9.4 K/UL (ref 3.6–11)
WBC # BLD AUTO: 9.7 K/UL (ref 3.6–11)
WBC # BLD AUTO: 9.8 K/UL (ref 3.6–11)
WBC #/AREA URNS HPF: NORMAL /HPF (ref 0–5)
WBC URNS QL MICRO: >100 /HPF (ref 0–4)
WBC URNS QL MICRO: >100 /HPF (ref 0–4)
WBC URNS QL MICRO: ABNORMAL /HPF (ref 0–4)

## 2020-01-01 PROCEDURE — 99218 HC RM OBSERVATION: CPT

## 2020-01-01 PROCEDURE — 74011250637 HC RX REV CODE- 250/637: Performed by: SURGERY

## 2020-01-01 PROCEDURE — 99232 SBSQ HOSP IP/OBS MODERATE 35: CPT | Performed by: INTERNAL MEDICINE

## 2020-01-01 PROCEDURE — 82962 GLUCOSE BLOOD TEST: CPT

## 2020-01-01 PROCEDURE — 74011250636 HC RX REV CODE- 250/636: Performed by: INTERNAL MEDICINE

## 2020-01-01 PROCEDURE — 84100 ASSAY OF PHOSPHORUS: CPT

## 2020-01-01 PROCEDURE — 74011250637 HC RX REV CODE- 250/637: Performed by: NURSE PRACTITIONER

## 2020-01-01 PROCEDURE — 77030011640 HC PAD GRND REM COVD -A: Performed by: THORACIC SURGERY (CARDIOTHORACIC VASCULAR SURGERY)

## 2020-01-01 PROCEDURE — 74011250637 HC RX REV CODE- 250/637: Performed by: PHYSICIAN ASSISTANT

## 2020-01-01 PROCEDURE — 74011250637 HC RX REV CODE- 250/637: Performed by: THORACIC SURGERY (CARDIOTHORACIC VASCULAR SURGERY)

## 2020-01-01 PROCEDURE — 87040 BLOOD CULTURE FOR BACTERIA: CPT

## 2020-01-01 PROCEDURE — 74011250637 HC RX REV CODE- 250/637: Performed by: HOSPITALIST

## 2020-01-01 PROCEDURE — 88311 DECALCIFY TISSUE: CPT

## 2020-01-01 PROCEDURE — 97116 GAIT TRAINING THERAPY: CPT

## 2020-01-01 PROCEDURE — 74011250636 HC RX REV CODE- 250/636: Performed by: NURSE ANESTHETIST, CERTIFIED REGISTERED

## 2020-01-01 PROCEDURE — 80048 BASIC METABOLIC PNL TOTAL CA: CPT

## 2020-01-01 PROCEDURE — 36415 COLL VENOUS BLD VENIPUNCTURE: CPT

## 2020-01-01 PROCEDURE — 82550 ASSAY OF CK (CPK): CPT

## 2020-01-01 PROCEDURE — 74176 CT ABD & PELVIS W/O CONTRAST: CPT

## 2020-01-01 PROCEDURE — 74011250636 HC RX REV CODE- 250/636: Performed by: FAMILY MEDICINE

## 2020-01-01 PROCEDURE — 85610 PROTHROMBIN TIME: CPT

## 2020-01-01 PROCEDURE — 74011636637 HC RX REV CODE- 636/637: Performed by: INTERNAL MEDICINE

## 2020-01-01 PROCEDURE — 74011250637 HC RX REV CODE- 250/637: Performed by: INTERNAL MEDICINE

## 2020-01-01 PROCEDURE — 74011250636 HC RX REV CODE- 250/636: Performed by: NURSE PRACTITIONER

## 2020-01-01 PROCEDURE — 97162 PT EVAL MOD COMPLEX 30 MIN: CPT

## 2020-01-01 PROCEDURE — 85730 THROMBOPLASTIN TIME PARTIAL: CPT

## 2020-01-01 PROCEDURE — 74011250636 HC RX REV CODE- 250/636: Performed by: PHYSICIAN ASSISTANT

## 2020-01-01 PROCEDURE — 80375 DRUG/SUBSTANCE NOS 1-3: CPT

## 2020-01-01 PROCEDURE — 81001 URINALYSIS AUTO W/SCOPE: CPT

## 2020-01-01 PROCEDURE — 65660000000 HC RM CCU STEPDOWN

## 2020-01-01 PROCEDURE — 97110 THERAPEUTIC EXERCISES: CPT | Performed by: PHYSICAL THERAPIST

## 2020-01-01 PROCEDURE — 85025 COMPLETE CBC W/AUTO DIFF WBC: CPT

## 2020-01-01 PROCEDURE — 77030018729 HC ELECTRD DEFIB PAD CARD -B: Performed by: INTERNAL MEDICINE

## 2020-01-01 PROCEDURE — 74011250637 HC RX REV CODE- 250/637: Performed by: SPECIALIST

## 2020-01-01 PROCEDURE — 99233 SBSQ HOSP IP/OBS HIGH 50: CPT | Performed by: INTERNAL MEDICINE

## 2020-01-01 PROCEDURE — 83735 ASSAY OF MAGNESIUM: CPT

## 2020-01-01 PROCEDURE — 05HN33Z INSERTION OF INFUSION DEVICE INTO LEFT INTERNAL JUGULAR VEIN, PERCUTANEOUS APPROACH: ICD-10-PCS | Performed by: RADIOLOGY

## 2020-01-01 PROCEDURE — 74011000250 HC RX REV CODE- 250: Performed by: PHYSICIAN ASSISTANT

## 2020-01-01 PROCEDURE — 74011000250 HC RX REV CODE- 250: Performed by: SPECIALIST

## 2020-01-01 PROCEDURE — 97530 THERAPEUTIC ACTIVITIES: CPT

## 2020-01-01 PROCEDURE — 74011250637 HC RX REV CODE- 250/637: Performed by: EMERGENCY MEDICINE

## 2020-01-01 PROCEDURE — 85652 RBC SED RATE AUTOMATED: CPT

## 2020-01-01 PROCEDURE — 71045 X-RAY EXAM CHEST 1 VIEW: CPT

## 2020-01-01 PROCEDURE — 77030040375: Performed by: INTERNAL MEDICINE

## 2020-01-01 PROCEDURE — 74011250636 HC RX REV CODE- 250/636: Performed by: HOSPITALIST

## 2020-01-01 PROCEDURE — 36430 TRANSFUSION BLD/BLD COMPNT: CPT

## 2020-01-01 PROCEDURE — 87186 SC STD MICRODIL/AGAR DIL: CPT

## 2020-01-01 PROCEDURE — 93005 ELECTROCARDIOGRAM TRACING: CPT

## 2020-01-01 PROCEDURE — 74011000250 HC RX REV CODE- 250: Performed by: EMERGENCY MEDICINE

## 2020-01-01 PROCEDURE — 97535 SELF CARE MNGMENT TRAINING: CPT

## 2020-01-01 PROCEDURE — 80053 COMPREHEN METABOLIC PANEL: CPT

## 2020-01-01 PROCEDURE — 0PB90ZZ EXCISION OF RIGHT CLAVICLE, OPEN APPROACH: ICD-10-PCS | Performed by: THORACIC SURGERY (CARDIOTHORACIC VASCULAR SURGERY)

## 2020-01-01 PROCEDURE — 74011250636 HC RX REV CODE- 250/636: Performed by: SURGERY

## 2020-01-01 PROCEDURE — C1769 GUIDE WIRE: HCPCS | Performed by: INTERNAL MEDICINE

## 2020-01-01 PROCEDURE — 74011250637 HC RX REV CODE- 250/637: Performed by: STUDENT IN AN ORGANIZED HEALTH CARE EDUCATION/TRAINING PROGRAM

## 2020-01-01 PROCEDURE — 93306 TTE W/DOPPLER COMPLETE: CPT

## 2020-01-01 PROCEDURE — 71250 CT THORAX DX C-: CPT

## 2020-01-01 PROCEDURE — C1773 RET DEV, INSERTABLE: HCPCS | Performed by: INTERNAL MEDICINE

## 2020-01-01 PROCEDURE — 99024 POSTOP FOLLOW-UP VISIT: CPT | Performed by: NURSE PRACTITIONER

## 2020-01-01 PROCEDURE — 94760 N-INVAS EAR/PLS OXIMETRY 1: CPT

## 2020-01-01 PROCEDURE — 85027 COMPLETE CBC AUTOMATED: CPT

## 2020-01-01 PROCEDURE — 74011000250 HC RX REV CODE- 250: Performed by: NURSE PRACTITIONER

## 2020-01-01 PROCEDURE — 76060000032 HC ANESTHESIA 0.5 TO 1 HR: Performed by: THORACIC SURGERY (CARDIOTHORACIC VASCULAR SURGERY)

## 2020-01-01 PROCEDURE — 77030018702 HC CLP LIG TI TELE -A: Performed by: THORACIC SURGERY (CARDIOTHORACIC VASCULAR SURGERY)

## 2020-01-01 PROCEDURE — 80202 ASSAY OF VANCOMYCIN: CPT

## 2020-01-01 PROCEDURE — 74011000258 HC RX REV CODE- 258: Performed by: EMERGENCY MEDICINE

## 2020-01-01 PROCEDURE — 77030038269 HC DRN EXT URIN PURWCK BARD -A

## 2020-01-01 PROCEDURE — 76210000006 HC OR PH I REC 0.5 TO 1 HR: Performed by: THORACIC SURGERY (CARDIOTHORACIC VASCULAR SURGERY)

## 2020-01-01 PROCEDURE — 74011636637 HC RX REV CODE- 636/637: Performed by: FAMILY MEDICINE

## 2020-01-01 PROCEDURE — 86140 C-REACTIVE PROTEIN: CPT

## 2020-01-01 PROCEDURE — 87077 CULTURE AEROBIC IDENTIFY: CPT

## 2020-01-01 PROCEDURE — 99285 EMERGENCY DEPT VISIT HI MDM: CPT

## 2020-01-01 PROCEDURE — 74011250637 HC RX REV CODE- 250/637: Performed by: FAMILY MEDICINE

## 2020-01-01 PROCEDURE — 73700 CT LOWER EXTREMITY W/O DYE: CPT

## 2020-01-01 PROCEDURE — 77030018673: Performed by: INTERNAL MEDICINE

## 2020-01-01 PROCEDURE — 74011000250 HC RX REV CODE- 250: Performed by: NURSE ANESTHETIST, CERTIFIED REGISTERED

## 2020-01-01 PROCEDURE — 99231 SBSQ HOSP IP/OBS SF/LOW 25: CPT | Performed by: INTERNAL MEDICINE

## 2020-01-01 PROCEDURE — 74011000250 HC RX REV CODE- 250: Performed by: INTERNAL MEDICINE

## 2020-01-01 PROCEDURE — 74011250637 HC RX REV CODE- 250/637: Performed by: NURSE ANESTHETIST, CERTIFIED REGISTERED

## 2020-01-01 PROCEDURE — 36589 REMOVAL TUNNELED CV CATH: CPT

## 2020-01-01 PROCEDURE — 77030002888 HC SUT CHRMC J&J -A: Performed by: SURGERY

## 2020-01-01 PROCEDURE — 77030041076 HC DRSG AG OPTICELL MDII -A

## 2020-01-01 PROCEDURE — 83690 ASSAY OF LIPASE: CPT

## 2020-01-01 PROCEDURE — 0DJ08ZZ INSPECTION OF UPPER INTESTINAL TRACT, VIA NATURAL OR ARTIFICIAL OPENING ENDOSCOPIC: ICD-10-PCS | Performed by: INTERNAL MEDICINE

## 2020-01-01 PROCEDURE — 81001 URINALYSIS AUTO W/SCOPE: CPT | Performed by: NURSE PRACTITIONER

## 2020-01-01 PROCEDURE — 87086 URINE CULTURE/COLONY COUNT: CPT

## 2020-01-01 PROCEDURE — 97535 SELF CARE MNGMENT TRAINING: CPT | Performed by: OCCUPATIONAL THERAPIST

## 2020-01-01 PROCEDURE — 77030040361 HC SLV COMPR DVT MDII -B: Performed by: THORACIC SURGERY (CARDIOTHORACIC VASCULAR SURGERY)

## 2020-01-01 PROCEDURE — 77030020365 HC SOL INJ SOD CL 0.9% 50ML

## 2020-01-01 PROCEDURE — 74011250636 HC RX REV CODE- 250/636: Performed by: ANESTHESIOLOGY

## 2020-01-01 PROCEDURE — 96368 THER/DIAG CONCURRENT INF: CPT

## 2020-01-01 PROCEDURE — 97110 THERAPEUTIC EXERCISES: CPT

## 2020-01-01 PROCEDURE — 77030028698 HC BLD TISS PLSM MEDT -D: Performed by: INTERNAL MEDICINE

## 2020-01-01 PROCEDURE — 77030013079 HC BLNKT BAIR HGGR 3M -A: Performed by: NURSE ANESTHETIST, CERTIFIED REGISTERED

## 2020-01-01 PROCEDURE — 84145 PROCALCITONIN (PCT): CPT

## 2020-01-01 PROCEDURE — 77030029065 HC DRSG HEMO QCLOT ZMED -B: Performed by: INTERNAL MEDICINE

## 2020-01-01 PROCEDURE — 2709999900 HC NON-CHARGEABLE SUPPLY: Performed by: INTERNAL MEDICINE

## 2020-01-01 PROCEDURE — 97110 THERAPEUTIC EXERCISES: CPT | Performed by: OCCUPATIONAL THERAPIST

## 2020-01-01 PROCEDURE — C1894 INTRO/SHEATH, NON-LASER: HCPCS | Performed by: INTERNAL MEDICINE

## 2020-01-01 PROCEDURE — 99223 1ST HOSP IP/OBS HIGH 75: CPT | Performed by: INTERNAL MEDICINE

## 2020-01-01 PROCEDURE — 76060000035 HC ANESTHESIA 2 TO 2.5 HR: Performed by: INTERNAL MEDICINE

## 2020-01-01 PROCEDURE — 74011000258 HC RX REV CODE- 258: Performed by: INTERNAL MEDICINE

## 2020-01-01 PROCEDURE — 74011000250 HC RX REV CODE- 250: Performed by: THORACIC SURGERY (CARDIOTHORACIC VASCULAR SURGERY)

## 2020-01-01 PROCEDURE — 97161 PT EVAL LOW COMPLEX 20 MIN: CPT

## 2020-01-01 PROCEDURE — 77030031139 HC SUT VCRL2 J&J -A: Performed by: THORACIC SURGERY (CARDIOTHORACIC VASCULAR SURGERY)

## 2020-01-01 PROCEDURE — 76060000034 HC ANESTHESIA 1.5 TO 2 HR: Performed by: INTERNAL MEDICINE

## 2020-01-01 PROCEDURE — 74011250636 HC RX REV CODE- 250/636: Performed by: THORACIC SURGERY (CARDIOTHORACIC VASCULAR SURGERY)

## 2020-01-01 PROCEDURE — 74011250636 HC RX REV CODE- 250/636: Performed by: EMERGENCY MEDICINE

## 2020-01-01 PROCEDURE — 99253 IP/OBS CNSLTJ NEW/EST LOW 45: CPT | Performed by: NURSE PRACTITIONER

## 2020-01-01 PROCEDURE — 87389 HIV-1 AG W/HIV-1&-2 AB AG IA: CPT

## 2020-01-01 PROCEDURE — A9540 TC99M MAA: HCPCS

## 2020-01-01 PROCEDURE — 99215 OFFICE O/P EST HI 40 MIN: CPT | Performed by: INTERNAL MEDICINE

## 2020-01-01 PROCEDURE — 86900 BLOOD TYPING SEROLOGIC ABO: CPT

## 2020-01-01 PROCEDURE — 99232 SBSQ HOSP IP/OBS MODERATE 35: CPT | Performed by: SPECIALIST

## 2020-01-01 PROCEDURE — 74240 X-RAY XM UPR GI TRC 1CNTRST: CPT

## 2020-01-01 PROCEDURE — 77030008462 HC STPLR SKN PROX J&J -A: Performed by: THORACIC SURGERY (CARDIOTHORACIC VASCULAR SURGERY)

## 2020-01-01 PROCEDURE — 74011000258 HC RX REV CODE- 258: Performed by: HOSPITALIST

## 2020-01-01 PROCEDURE — 0Y6J0Z1 DETACHMENT AT LEFT LOWER LEG, HIGH, OPEN APPROACH: ICD-10-PCS | Performed by: SURGERY

## 2020-01-01 PROCEDURE — 93650 ICAR CATH ABLTJ AV NODE FUNC: CPT | Performed by: INTERNAL MEDICINE

## 2020-01-01 PROCEDURE — 74011636637 HC RX REV CODE- 636/637: Performed by: SURGERY

## 2020-01-01 PROCEDURE — 74011250636 HC RX REV CODE- 250/636: Performed by: STUDENT IN AN ORGANIZED HEALTH CARE EDUCATION/TRAINING PROGRAM

## 2020-01-01 PROCEDURE — 99024 POSTOP FOLLOW-UP VISIT: CPT | Performed by: PHYSICIAN ASSISTANT

## 2020-01-01 PROCEDURE — 1111F DSCHRG MED/CURRENT MED MERGE: CPT | Performed by: INTERNAL MEDICINE

## 2020-01-01 PROCEDURE — 65270000032 HC RM SEMIPRIVATE

## 2020-01-01 PROCEDURE — C1786 PMKR, SINGLE, RATE-RESP: HCPCS | Performed by: INTERNAL MEDICINE

## 2020-01-01 PROCEDURE — 99214 OFFICE O/P EST MOD 30 MIN: CPT | Performed by: INTERNAL MEDICINE

## 2020-01-01 PROCEDURE — 87205 SMEAR GRAM STAIN: CPT

## 2020-01-01 PROCEDURE — 74011000258 HC RX REV CODE- 258: Performed by: FAMILY MEDICINE

## 2020-01-01 PROCEDURE — 74011000258 HC RX REV CODE- 258: Performed by: SURGERY

## 2020-01-01 PROCEDURE — 77030040922 HC BLNKT HYPOTHRM STRY -A

## 2020-01-01 PROCEDURE — 83036 HEMOGLOBIN GLYCOSYLATED A1C: CPT

## 2020-01-01 PROCEDURE — 77030002916 HC SUT ETHLN J&J -A: Performed by: THORACIC SURGERY (CARDIOTHORACIC VASCULAR SURGERY)

## 2020-01-01 PROCEDURE — 99213 OFFICE O/P EST LOW 20 MIN: CPT | Performed by: INTERNAL MEDICINE

## 2020-01-01 PROCEDURE — 96374 THER/PROPH/DIAG INJ IV PUSH: CPT

## 2020-01-01 PROCEDURE — 77030013797 HC KT TRNSDUC PRSSR EDWD -A: Performed by: INTERNAL MEDICINE

## 2020-01-01 PROCEDURE — 96376 TX/PRO/DX INJ SAME DRUG ADON: CPT

## 2020-01-01 PROCEDURE — 74011636637 HC RX REV CODE- 636/637: Performed by: HOSPITALIST

## 2020-01-01 PROCEDURE — 77030002996 HC SUT SLK J&J -A: Performed by: THORACIC SURGERY (CARDIOTHORACIC VASCULAR SURGERY)

## 2020-01-01 PROCEDURE — C9113 INJ PANTOPRAZOLE SODIUM, VIA: HCPCS | Performed by: PHYSICIAN ASSISTANT

## 2020-01-01 PROCEDURE — 76060000034 HC ANESTHESIA 1.5 TO 2 HR: Performed by: THORACIC SURGERY (CARDIOTHORACIC VASCULAR SURGERY)

## 2020-01-01 PROCEDURE — 87635 SARS-COV-2 COVID-19 AMP PRB: CPT

## 2020-01-01 PROCEDURE — 76060000033 HC ANESTHESIA 1 TO 1.5 HR: Performed by: SURGERY

## 2020-01-01 PROCEDURE — 83880 ASSAY OF NATRIURETIC PEPTIDE: CPT

## 2020-01-01 PROCEDURE — 76210000016 HC OR PH I REC 1 TO 1.5 HR: Performed by: SURGERY

## 2020-01-01 PROCEDURE — 99255 IP/OBS CONSLTJ NEW/EST HI 80: CPT | Performed by: INTERNAL MEDICINE

## 2020-01-01 PROCEDURE — 77030012929 HC DIL URET COOK -C: Performed by: INTERNAL MEDICINE

## 2020-01-01 PROCEDURE — 84484 ASSAY OF TROPONIN QUANT: CPT

## 2020-01-01 PROCEDURE — 99284 EMERGENCY DEPT VISIT MOD MDM: CPT

## 2020-01-01 PROCEDURE — 77030008684 HC TU ET CUF COVD -B: Performed by: ANESTHESIOLOGY

## 2020-01-01 PROCEDURE — 0JWT0PZ REVISION OF CARDIAC RHYTHM RELATED DEVICE IN TRUNK SUBCUTANEOUS TISSUE AND FASCIA, OPEN APPROACH: ICD-10-PCS | Performed by: INTERNAL MEDICINE

## 2020-01-01 PROCEDURE — 99223 1ST HOSP IP/OBS HIGH 75: CPT | Performed by: SPECIALIST

## 2020-01-01 PROCEDURE — 94761 N-INVAS EAR/PLS OXIMETRY MLT: CPT

## 2020-01-01 PROCEDURE — 77030011264 HC ELECTRD BLD EXT COVD -A: Performed by: THORACIC SURGERY (CARDIOTHORACIC VASCULAR SURGERY)

## 2020-01-01 PROCEDURE — 77030031139 HC SUT VCRL2 J&J -A: Performed by: SURGERY

## 2020-01-01 PROCEDURE — 77010033678 HC OXYGEN DAILY

## 2020-01-01 PROCEDURE — 77030019908 HC STETH ESOPH SIMS -A: Performed by: NURSE ANESTHETIST, CERTIFIED REGISTERED

## 2020-01-01 PROCEDURE — C1760 CLOSURE DEV, VASC: HCPCS | Performed by: INTERNAL MEDICINE

## 2020-01-01 PROCEDURE — 97165 OT EVAL LOW COMPLEX 30 MIN: CPT

## 2020-01-01 PROCEDURE — 88307 TISSUE EXAM BY PATHOLOGIST: CPT

## 2020-01-01 PROCEDURE — 96375 TX/PRO/DX INJ NEW DRUG ADDON: CPT

## 2020-01-01 PROCEDURE — 87102 FUNGUS ISOLATION CULTURE: CPT

## 2020-01-01 PROCEDURE — 74011250637 HC RX REV CODE- 250/637: Performed by: ANESTHESIOLOGY

## 2020-01-01 PROCEDURE — 77030026438 HC STYL ET INTUB CARD -A: Performed by: NURSE ANESTHETIST, CERTIFIED REGISTERED

## 2020-01-01 PROCEDURE — 0PB00ZZ EXCISION OF STERNUM, OPEN APPROACH: ICD-10-PCS | Performed by: THORACIC SURGERY (CARDIOTHORACIC VASCULAR SURGERY)

## 2020-01-01 PROCEDURE — 76210000017 HC OR PH I REC 1.5 TO 2 HR: Performed by: THORACIC SURGERY (CARDIOTHORACIC VASCULAR SURGERY)

## 2020-01-01 PROCEDURE — 77030002933 HC SUT MCRYL J&J -A: Performed by: THORACIC SURGERY (CARDIOTHORACIC VASCULAR SURGERY)

## 2020-01-01 PROCEDURE — 74011000258 HC RX REV CODE- 258: Performed by: STUDENT IN AN ORGANIZED HEALTH CARE EDUCATION/TRAINING PROGRAM

## 2020-01-01 PROCEDURE — 86850 RBC ANTIBODY SCREEN: CPT

## 2020-01-01 PROCEDURE — 33274 TCAT INSJ/RPL PERM LDLS PM: CPT | Performed by: INTERNAL MEDICINE

## 2020-01-01 PROCEDURE — 87899 AGENT NOS ASSAY W/OPTIC: CPT

## 2020-01-01 PROCEDURE — C1751 CATH, INF, PER/CENT/MIDLINE: HCPCS

## 2020-01-01 PROCEDURE — 76937 US GUIDE VASCULAR ACCESS: CPT

## 2020-01-01 PROCEDURE — 76770 US EXAM ABDO BACK WALL COMP: CPT

## 2020-01-01 PROCEDURE — 87075 CULTR BACTERIA EXCEPT BLOOD: CPT

## 2020-01-01 PROCEDURE — 76010000138 HC OR TIME 0.5 TO 1 HR: Performed by: THORACIC SURGERY (CARDIOTHORACIC VASCULAR SURGERY)

## 2020-01-01 PROCEDURE — 77030006835 HC BLD SAW SAG STRY -B: Performed by: SURGERY

## 2020-01-01 PROCEDURE — 0WJ80ZZ INSPECTION OF CHEST WALL, OPEN APPROACH: ICD-10-PCS | Performed by: THORACIC SURGERY (CARDIOTHORACIC VASCULAR SURGERY)

## 2020-01-01 PROCEDURE — 76010000149 HC OR TIME 1 TO 1.5 HR: Performed by: THORACIC SURGERY (CARDIOTHORACIC VASCULAR SURGERY)

## 2020-01-01 PROCEDURE — C1769 GUIDE WIRE: HCPCS

## 2020-01-01 PROCEDURE — 96365 THER/PROPH/DIAG IV INF INIT: CPT

## 2020-01-01 PROCEDURE — 93926 LOWER EXTREMITY STUDY: CPT

## 2020-01-01 PROCEDURE — 77030010507 HC ADH SKN DERMBND J&J -B: Performed by: THORACIC SURGERY (CARDIOTHORACIC VASCULAR SURGERY)

## 2020-01-01 PROCEDURE — 77030014007 HC SPNG HEMSTAT J&J -B: Performed by: THORACIC SURGERY (CARDIOTHORACIC VASCULAR SURGERY)

## 2020-01-01 PROCEDURE — 76010000149 HC OR TIME 1 TO 1.5 HR: Performed by: SURGERY

## 2020-01-01 PROCEDURE — 83540 ASSAY OF IRON: CPT

## 2020-01-01 PROCEDURE — 77030008463 HC STPLR SKN PROX J&J -B: Performed by: SURGERY

## 2020-01-01 PROCEDURE — 76060000033 HC ANESTHESIA 1 TO 1.5 HR: Performed by: INTERNAL MEDICINE

## 2020-01-01 PROCEDURE — 77030026438 HC STYL ET INTUB CARD -A: Performed by: ANESTHESIOLOGY

## 2020-01-01 PROCEDURE — C1733 CATH, EP, OTHR THAN COOL-TIP: HCPCS | Performed by: INTERNAL MEDICINE

## 2020-01-01 PROCEDURE — 77030018836 HC SOL IRR NACL ICUM -A: Performed by: SURGERY

## 2020-01-01 PROCEDURE — 76010000153 HC OR TIME 1.5 TO 2 HR: Performed by: THORACIC SURGERY (CARDIOTHORACIC VASCULAR SURGERY)

## 2020-01-01 PROCEDURE — 77030031139 HC SUT VCRL2 J&J -A: Performed by: INTERNAL MEDICINE

## 2020-01-01 PROCEDURE — 77030002986 HC SUT PROL J&J -A

## 2020-01-01 PROCEDURE — 77030018836 HC SOL IRR NACL ICUM -A: Performed by: THORACIC SURGERY (CARDIOTHORACIC VASCULAR SURGERY)

## 2020-01-01 PROCEDURE — 76040000019: Performed by: INTERNAL MEDICINE

## 2020-01-01 PROCEDURE — 77030008684 HC TU ET CUF COVD -B: Performed by: NURSE ANESTHETIST, CERTIFIED REGISTERED

## 2020-01-01 PROCEDURE — 77030039046 HC PAD DEFIB RADIOTRNSPNT CNMD -B: Performed by: INTERNAL MEDICINE

## 2020-01-01 PROCEDURE — 33234 REMOVAL OF PACEMAKER SYSTEM: CPT | Performed by: INTERNAL MEDICINE

## 2020-01-01 PROCEDURE — 80069 RENAL FUNCTION PANEL: CPT

## 2020-01-01 PROCEDURE — 82728 ASSAY OF FERRITIN: CPT

## 2020-01-01 PROCEDURE — 87449 NOS EACH ORGANISM AG IA: CPT

## 2020-01-01 PROCEDURE — 76210000016 HC OR PH I REC 1 TO 1.5 HR: Performed by: THORACIC SURGERY (CARDIOTHORACIC VASCULAR SURGERY)

## 2020-01-01 PROCEDURE — 33222 RELOCATION POCKET PACEMAKER: CPT | Performed by: INTERNAL MEDICINE

## 2020-01-01 PROCEDURE — 97530 THERAPEUTIC ACTIVITIES: CPT | Performed by: PHYSICAL THERAPIST

## 2020-01-01 PROCEDURE — P9016 RBC LEUKOCYTES REDUCED: HCPCS

## 2020-01-01 PROCEDURE — 65620000000 HC RM CCU GENERAL

## 2020-01-01 PROCEDURE — 71046 X-RAY EXAM CHEST 2 VIEWS: CPT

## 2020-01-01 PROCEDURE — 97166 OT EVAL MOD COMPLEX 45 MIN: CPT | Performed by: OCCUPATIONAL THERAPIST

## 2020-01-01 PROCEDURE — 88305 TISSUE EXAM BY PATHOLOGIST: CPT

## 2020-01-01 PROCEDURE — 74011000250 HC RX REV CODE- 250: Performed by: HOSPITALIST

## 2020-01-01 PROCEDURE — 49002 REOPENING OF ABDOMEN: CPT | Performed by: THORACIC SURGERY (CARDIOTHORACIC VASCULAR SURGERY)

## 2020-01-01 PROCEDURE — 74011250637 HC RX REV CODE- 250/637

## 2020-01-01 PROCEDURE — 0RBE0ZZ EXCISION OF RIGHT STERNOCLAVICULAR JOINT, OPEN APPROACH: ICD-10-PCS | Performed by: THORACIC SURGERY (CARDIOTHORACIC VASCULAR SURGERY)

## 2020-01-01 PROCEDURE — 36555 INSERT NON-TUNNEL CV CATH: CPT

## 2020-01-01 PROCEDURE — C1732 CATH, EP, DIAG/ABL, 3D/VECT: HCPCS | Performed by: INTERNAL MEDICINE

## 2020-01-01 PROCEDURE — 0HX5XZZ TRANSFER CHEST SKIN, EXTERNAL APPROACH: ICD-10-PCS | Performed by: THORACIC SURGERY (CARDIOTHORACIC VASCULAR SURGERY)

## 2020-01-01 PROCEDURE — 83605 ASSAY OF LACTIC ACID: CPT

## 2020-01-01 PROCEDURE — 77030040506 HC DRN WND MDII -A: Performed by: THORACIC SURGERY (CARDIOTHORACIC VASCULAR SURGERY)

## 2020-01-01 PROCEDURE — 2709999900 HC NON-CHARGEABLE SUPPLY: Performed by: THORACIC SURGERY (CARDIOTHORACIC VASCULAR SURGERY)

## 2020-01-01 PROCEDURE — 93306 TTE W/DOPPLER COMPLETE: CPT | Performed by: INTERNAL MEDICINE

## 2020-01-01 PROCEDURE — 74011250636 HC RX REV CODE- 250/636: Performed by: RADIOLOGY

## 2020-01-01 PROCEDURE — 86923 COMPATIBILITY TEST ELECTRIC: CPT

## 2020-01-01 PROCEDURE — 77030006754 HC BLD SAW GIGLI ZIMM -B: Performed by: THORACIC SURGERY (CARDIOTHORACIC VASCULAR SURGERY)

## 2020-01-01 PROCEDURE — 99441 PR PHYS/QHP TELEPHONE EVALUATION 5-10 MIN: CPT | Performed by: INTERNAL MEDICINE

## 2020-01-01 PROCEDURE — 93041 RHYTHM ECG TRACING: CPT

## 2020-01-01 PROCEDURE — 30233N1 TRANSFUSION OF NONAUTOLOGOUS RED BLOOD CELLS INTO PERIPHERAL VEIN, PERCUTANEOUS APPROACH: ICD-10-PCS | Performed by: INTERNAL MEDICINE

## 2020-01-01 PROCEDURE — 77030002966 HC SUT PDS J&J -A: Performed by: THORACIC SURGERY (CARDIOTHORACIC VASCULAR SURGERY)

## 2020-01-01 PROCEDURE — 96366 THER/PROPH/DIAG IV INF ADDON: CPT

## 2020-01-01 PROCEDURE — 97530 THERAPEUTIC ACTIVITIES: CPT | Performed by: OCCUPATIONAL THERAPIST

## 2020-01-01 PROCEDURE — 76060000031 HC ANESTHESIA FIRST 0.5 HR: Performed by: INTERNAL MEDICINE

## 2020-01-01 PROCEDURE — 76060000035 HC ANESTHESIA 2 TO 2.5 HR: Performed by: THORACIC SURGERY (CARDIOTHORACIC VASCULAR SURGERY)

## 2020-01-01 PROCEDURE — 77030002996 HC SUT SLK J&J -A: Performed by: INTERNAL MEDICINE

## 2020-01-01 PROCEDURE — 36558 INSERT TUNNELED CV CATH: CPT

## 2020-01-01 PROCEDURE — 93000 ELECTROCARDIOGRAM COMPLETE: CPT | Performed by: INTERNAL MEDICINE

## 2020-01-01 PROCEDURE — 99214 OFFICE O/P EST MOD 30 MIN: CPT | Performed by: NURSE PRACTITIONER

## 2020-01-01 PROCEDURE — 77030010507 HC ADH SKN DERMBND J&J -B

## 2020-01-01 PROCEDURE — 77030031139 HC SUT VCRL2 J&J -A

## 2020-01-01 PROCEDURE — 77030019952 HC CANSTR VAC ASST KCON -B: Performed by: THORACIC SURGERY (CARDIOTHORACIC VASCULAR SURGERY)

## 2020-01-01 DEVICE — TRANSCATHETER PACING SYS MICRA --: Type: IMPLANTABLE DEVICE | Site: HEART | Status: FUNCTIONAL

## 2020-01-01 RX ORDER — WARFARIN 2 MG/1
2 TABLET ORAL ONCE
Status: COMPLETED | OUTPATIENT
Start: 2020-01-01 | End: 2020-01-01

## 2020-01-01 RX ORDER — CARVEDILOL 12.5 MG/1
12.5 TABLET ORAL 2 TIMES DAILY WITH MEALS
Status: DISCONTINUED | OUTPATIENT
Start: 2020-01-01 | End: 2020-01-01 | Stop reason: HOSPADM

## 2020-01-01 RX ORDER — PROPOFOL 10 MG/ML
INJECTION, EMULSION INTRAVENOUS AS NEEDED
Status: DISCONTINUED | OUTPATIENT
Start: 2020-01-01 | End: 2020-01-01 | Stop reason: HOSPADM

## 2020-01-01 RX ORDER — SODIUM CHLORIDE 9 MG/ML
50 INJECTION, SOLUTION INTRAVENOUS CONTINUOUS
Status: DISPENSED | OUTPATIENT
Start: 2020-01-01 | End: 2020-01-01

## 2020-01-01 RX ORDER — MIDAZOLAM HYDROCHLORIDE 1 MG/ML
1 INJECTION, SOLUTION INTRAMUSCULAR; INTRAVENOUS AS NEEDED
Status: DISCONTINUED | OUTPATIENT
Start: 2020-01-01 | End: 2020-01-01 | Stop reason: HOSPADM

## 2020-01-01 RX ORDER — VANCOMYCIN HYDROCHLORIDE
1250
Status: DISCONTINUED | OUTPATIENT
Start: 2020-01-01 | End: 2020-01-01

## 2020-01-01 RX ORDER — BUSPIRONE HYDROCHLORIDE 10 MG/1
10 TABLET ORAL 2 TIMES DAILY
Status: DISCONTINUED | OUTPATIENT
Start: 2020-01-01 | End: 2020-01-01 | Stop reason: HOSPADM

## 2020-01-01 RX ORDER — DILTIAZEM HYDROCHLORIDE 5 MG/ML
10 INJECTION INTRAVENOUS
Status: COMPLETED | OUTPATIENT
Start: 2020-01-01 | End: 2020-01-01

## 2020-01-01 RX ORDER — NEOSTIGMINE METHYLSULFATE 1 MG/ML
INJECTION, SOLUTION INTRAVENOUS AS NEEDED
Status: DISCONTINUED | OUTPATIENT
Start: 2020-01-01 | End: 2020-01-01 | Stop reason: HOSPADM

## 2020-01-01 RX ORDER — ONDANSETRON 2 MG/ML
INJECTION INTRAMUSCULAR; INTRAVENOUS AS NEEDED
Status: DISCONTINUED | OUTPATIENT
Start: 2020-01-01 | End: 2020-01-01 | Stop reason: HOSPADM

## 2020-01-01 RX ORDER — BUMETANIDE 1 MG/1
1 TABLET ORAL DAILY
Qty: 60 TAB | Refills: 3 | Status: ON HOLD
Start: 2020-01-01 | End: 2020-01-01 | Stop reason: DRUGHIGH

## 2020-01-01 RX ORDER — DIPHENHYDRAMINE HCL 25 MG
25 CAPSULE ORAL
Status: DISCONTINUED | OUTPATIENT
Start: 2020-01-01 | End: 2020-01-01 | Stop reason: HOSPADM

## 2020-01-01 RX ORDER — HEPARIN 100 UNIT/ML
500 SYRINGE INTRAVENOUS ONCE
Status: DISCONTINUED | OUTPATIENT
Start: 2020-01-01 | End: 2020-01-01 | Stop reason: HOSPADM

## 2020-01-01 RX ORDER — SODIUM CHLORIDE 0.9 % (FLUSH) 0.9 %
5-40 SYRINGE (ML) INJECTION EVERY 8 HOURS
Status: DISCONTINUED | OUTPATIENT
Start: 2020-01-01 | End: 2020-01-01 | Stop reason: HOSPADM

## 2020-01-01 RX ORDER — HYDROMORPHONE HYDROCHLORIDE 1 MG/ML
1 INJECTION, SOLUTION INTRAMUSCULAR; INTRAVENOUS; SUBCUTANEOUS
Status: DISCONTINUED | OUTPATIENT
Start: 2020-01-01 | End: 2020-01-01

## 2020-01-01 RX ORDER — OXYCODONE AND ACETAMINOPHEN 5; 325 MG/1; MG/1
2 TABLET ORAL
Status: DISCONTINUED | OUTPATIENT
Start: 2020-01-01 | End: 2020-01-01 | Stop reason: ALTCHOICE

## 2020-01-01 RX ORDER — HYDRALAZINE HYDROCHLORIDE 50 MG/1
100 TABLET, FILM COATED ORAL 3 TIMES DAILY
Status: DISCONTINUED | OUTPATIENT
Start: 2020-01-01 | End: 2020-01-01 | Stop reason: HOSPADM

## 2020-01-01 RX ORDER — MIDAZOLAM HYDROCHLORIDE 1 MG/ML
0.5 INJECTION, SOLUTION INTRAMUSCULAR; INTRAVENOUS
Status: DISCONTINUED | OUTPATIENT
Start: 2020-01-01 | End: 2020-01-01 | Stop reason: HOSPADM

## 2020-01-01 RX ORDER — DIPHENHYDRAMINE HYDROCHLORIDE 50 MG/ML
12.5 INJECTION, SOLUTION INTRAMUSCULAR; INTRAVENOUS AS NEEDED
Status: DISCONTINUED | OUTPATIENT
Start: 2020-01-01 | End: 2020-01-01 | Stop reason: HOSPADM

## 2020-01-01 RX ORDER — FENTANYL CITRATE 50 UG/ML
200 INJECTION, SOLUTION INTRAMUSCULAR; INTRAVENOUS
Status: DISCONTINUED | OUTPATIENT
Start: 2020-01-01 | End: 2020-01-01

## 2020-01-01 RX ORDER — FENTANYL CITRATE 50 UG/ML
25-50 INJECTION, SOLUTION INTRAMUSCULAR; INTRAVENOUS
Status: DISCONTINUED | OUTPATIENT
Start: 2020-01-01 | End: 2020-01-01

## 2020-01-01 RX ORDER — SODIUM CHLORIDE 9 MG/ML
75 INJECTION, SOLUTION INTRAVENOUS CONTINUOUS
Status: DISPENSED | OUTPATIENT
Start: 2020-01-01 | End: 2020-01-01

## 2020-01-01 RX ORDER — HEPARIN SODIUM 200 [USP'U]/100ML
400 INJECTION, SOLUTION INTRAVENOUS ONCE
Status: DISCONTINUED | OUTPATIENT
Start: 2020-01-01 | End: 2020-01-01 | Stop reason: HOSPADM

## 2020-01-01 RX ORDER — METOPROLOL TARTRATE 5 MG/5ML
5 INJECTION INTRAVENOUS
Status: DISCONTINUED | OUTPATIENT
Start: 2020-01-01 | End: 2020-01-01

## 2020-01-01 RX ORDER — FENTANYL CITRATE 50 UG/ML
25-200 INJECTION, SOLUTION INTRAMUSCULAR; INTRAVENOUS
Status: DISCONTINUED | OUTPATIENT
Start: 2020-01-01 | End: 2020-01-01 | Stop reason: HOSPADM

## 2020-01-01 RX ORDER — SODIUM CHLORIDE, SODIUM LACTATE, POTASSIUM CHLORIDE, CALCIUM CHLORIDE 600; 310; 30; 20 MG/100ML; MG/100ML; MG/100ML; MG/100ML
INJECTION, SOLUTION INTRAVENOUS
Status: DISCONTINUED | OUTPATIENT
Start: 2020-01-01 | End: 2020-01-01

## 2020-01-01 RX ORDER — SODIUM CHLORIDE 9 MG/ML
20 INJECTION, SOLUTION INTRAVENOUS CONTINUOUS
Status: DISCONTINUED | OUTPATIENT
Start: 2020-01-01 | End: 2020-01-01

## 2020-01-01 RX ORDER — SODIUM CHLORIDE, SODIUM LACTATE, POTASSIUM CHLORIDE, CALCIUM CHLORIDE 600; 310; 30; 20 MG/100ML; MG/100ML; MG/100ML; MG/100ML
25 INJECTION, SOLUTION INTRAVENOUS CONTINUOUS
Status: DISCONTINUED | OUTPATIENT
Start: 2020-01-01 | End: 2020-01-01 | Stop reason: HOSPADM

## 2020-01-01 RX ORDER — POTASSIUM CHLORIDE 750 MG/1
40 TABLET, FILM COATED, EXTENDED RELEASE ORAL
Status: COMPLETED | OUTPATIENT
Start: 2020-01-01 | End: 2020-01-01

## 2020-01-01 RX ORDER — METOPROLOL TARTRATE 25 MG/1
25 TABLET, FILM COATED ORAL 2 TIMES DAILY
Qty: 60 TAB | Refills: 3 | Status: SHIPPED | OUTPATIENT
Start: 2020-01-01 | End: 2020-01-01

## 2020-01-01 RX ORDER — SODIUM CHLORIDE 0.9 % (FLUSH) 0.9 %
10 SYRINGE (ML) INJECTION
Status: DISCONTINUED | OUTPATIENT
Start: 2020-01-01 | End: 2020-01-01

## 2020-01-01 RX ORDER — VANCOMYCIN HYDROCHLORIDE
1250
Status: DISCONTINUED | OUTPATIENT
Start: 2020-01-01 | End: 2020-01-01 | Stop reason: HOSPADM

## 2020-01-01 RX ORDER — ROPIVACAINE HYDROCHLORIDE 5 MG/ML
30 INJECTION, SOLUTION EPIDURAL; INFILTRATION; PERINEURAL ONCE
Status: DISCONTINUED | OUTPATIENT
Start: 2020-01-01 | End: 2020-01-01 | Stop reason: HOSPADM

## 2020-01-01 RX ORDER — CARVEDILOL 3.12 MG/1
3.12 TABLET ORAL 2 TIMES DAILY WITH MEALS
Status: DISCONTINUED | OUTPATIENT
Start: 2020-01-01 | End: 2020-01-01

## 2020-01-01 RX ORDER — HYDROMORPHONE HYDROCHLORIDE 1 MG/ML
0.5 INJECTION, SOLUTION INTRAMUSCULAR; INTRAVENOUS; SUBCUTANEOUS
Status: DISCONTINUED | OUTPATIENT
Start: 2020-01-01 | End: 2020-01-01

## 2020-01-01 RX ORDER — MIDAZOLAM HYDROCHLORIDE 1 MG/ML
INJECTION, SOLUTION INTRAMUSCULAR; INTRAVENOUS AS NEEDED
Status: DISCONTINUED | OUTPATIENT
Start: 2020-01-01 | End: 2020-01-01 | Stop reason: HOSPADM

## 2020-01-01 RX ORDER — FENTANYL CITRATE 50 UG/ML
25 INJECTION, SOLUTION INTRAMUSCULAR; INTRAVENOUS
Status: DISCONTINUED | OUTPATIENT
Start: 2020-01-01 | End: 2020-01-01 | Stop reason: HOSPADM

## 2020-01-01 RX ORDER — HEPARIN SODIUM 10000 [USP'U]/100ML
9-25 INJECTION, SOLUTION INTRAVENOUS
Status: DISCONTINUED | OUTPATIENT
Start: 2020-01-01 | End: 2020-01-01

## 2020-01-01 RX ORDER — GABAPENTIN 100 MG/1
100 CAPSULE ORAL 3 TIMES DAILY
Status: ON HOLD | COMMUNITY
Start: 2020-01-01 | End: 2020-01-01

## 2020-01-01 RX ORDER — POLYETHYLENE GLYCOL 3350 17 G/17G
17 POWDER, FOR SOLUTION ORAL DAILY PRN
Status: DISCONTINUED | OUTPATIENT
Start: 2020-01-01 | End: 2020-01-01 | Stop reason: HOSPADM

## 2020-01-01 RX ORDER — SODIUM CHLORIDE 0.9 % (FLUSH) 0.9 %
5-40 SYRINGE (ML) INJECTION AS NEEDED
Status: DISCONTINUED | OUTPATIENT
Start: 2020-01-01 | End: 2020-01-01

## 2020-01-01 RX ORDER — METOPROLOL SUCCINATE 50 MG/1
50 TABLET, EXTENDED RELEASE ORAL DAILY
Status: DISCONTINUED | OUTPATIENT
Start: 2020-01-01 | End: 2020-01-01

## 2020-01-01 RX ORDER — BUMETANIDE 1 MG/1
0.5 TABLET ORAL 2 TIMES DAILY
Status: ON HOLD | COMMUNITY
End: 2020-01-01 | Stop reason: SDUPTHER

## 2020-01-01 RX ORDER — BUMETANIDE 1 MG/1
1 TABLET ORAL 2 TIMES DAILY
Qty: 60 TAB | Refills: 0 | Status: SHIPPED | OUTPATIENT
Start: 2020-01-01 | End: 2021-01-01

## 2020-01-01 RX ORDER — PHENYLEPHRINE HCL IN 0.9% NACL 0.4MG/10ML
SYRINGE (ML) INTRAVENOUS AS NEEDED
Status: DISCONTINUED | OUTPATIENT
Start: 2020-01-01 | End: 2020-01-01 | Stop reason: HOSPADM

## 2020-01-01 RX ORDER — VANCOMYCIN/0.9 % SOD CHLORIDE 1.5G/250ML
1500 PLASTIC BAG, INJECTION (ML) INTRAVENOUS ONCE
Status: COMPLETED | OUTPATIENT
Start: 2020-01-01 | End: 2020-01-01

## 2020-01-01 RX ORDER — LEVOFLOXACIN 5 MG/ML
750 INJECTION, SOLUTION INTRAVENOUS
Status: COMPLETED | OUTPATIENT
Start: 2020-01-01 | End: 2020-01-01

## 2020-01-01 RX ORDER — FLUMAZENIL 0.1 MG/ML
0.2 INJECTION INTRAVENOUS
Status: DISCONTINUED | OUTPATIENT
Start: 2020-01-01 | End: 2020-01-01 | Stop reason: HOSPADM

## 2020-01-01 RX ORDER — LIDOCAINE HYDROCHLORIDE 20 MG/ML
INJECTION, SOLUTION EPIDURAL; INFILTRATION; INTRACAUDAL; PERINEURAL AS NEEDED
Status: DISCONTINUED | OUTPATIENT
Start: 2020-01-01 | End: 2020-01-01 | Stop reason: HOSPADM

## 2020-01-01 RX ORDER — BUMETANIDE 1 MG/1
2 TABLET ORAL DAILY
Status: DISCONTINUED | OUTPATIENT
Start: 2020-01-01 | End: 2020-01-01

## 2020-01-01 RX ORDER — SUCCINYLCHOLINE CHLORIDE 20 MG/ML
INJECTION INTRAMUSCULAR; INTRAVENOUS AS NEEDED
Status: DISCONTINUED | OUTPATIENT
Start: 2020-01-01 | End: 2020-01-01 | Stop reason: HOSPADM

## 2020-01-01 RX ORDER — LIDOCAINE HYDROCHLORIDE 10 MG/ML
0.1 INJECTION, SOLUTION EPIDURAL; INFILTRATION; INTRACAUDAL; PERINEURAL AS NEEDED
Status: DISCONTINUED | OUTPATIENT
Start: 2020-01-01 | End: 2020-01-01 | Stop reason: HOSPADM

## 2020-01-01 RX ORDER — CLINDAMYCIN PHOSPHATE 600 MG/50ML
600 INJECTION INTRAVENOUS ONCE
Status: COMPLETED | OUTPATIENT
Start: 2020-01-01 | End: 2020-01-01

## 2020-01-01 RX ORDER — ONDANSETRON 2 MG/ML
4 INJECTION INTRAMUSCULAR; INTRAVENOUS
Status: DISCONTINUED | OUTPATIENT
Start: 2020-01-01 | End: 2020-01-01

## 2020-01-01 RX ORDER — ONDANSETRON 2 MG/ML
4 INJECTION INTRAMUSCULAR; INTRAVENOUS
Status: DISCONTINUED | OUTPATIENT
Start: 2020-01-01 | End: 2020-01-01 | Stop reason: HOSPADM

## 2020-01-01 RX ORDER — DIPHENHYDRAMINE HCL 25 MG
50 CAPSULE ORAL
Status: DISCONTINUED | OUTPATIENT
Start: 2020-01-01 | End: 2020-01-01 | Stop reason: HOSPADM

## 2020-01-01 RX ORDER — DILTIAZEM HYDROCHLORIDE 180 MG/1
180 CAPSULE, COATED, EXTENDED RELEASE ORAL DAILY
Status: DISCONTINUED | OUTPATIENT
Start: 2020-01-01 | End: 2020-01-01 | Stop reason: HOSPADM

## 2020-01-01 RX ORDER — VANCOMYCIN HYDROCHLORIDE
1250 ONCE
Status: COMPLETED | OUTPATIENT
Start: 2020-01-01 | End: 2020-01-01

## 2020-01-01 RX ORDER — SERTRALINE HYDROCHLORIDE 100 MG/1
100 TABLET, FILM COATED ORAL DAILY
COMMUNITY
Start: 2020-01-01

## 2020-01-01 RX ORDER — SODIUM CHLORIDE, SODIUM LACTATE, POTASSIUM CHLORIDE, CALCIUM CHLORIDE 600; 310; 30; 20 MG/100ML; MG/100ML; MG/100ML; MG/100ML
20 INJECTION, SOLUTION INTRAVENOUS CONTINUOUS
Status: DISCONTINUED | OUTPATIENT
Start: 2020-01-01 | End: 2020-01-01

## 2020-01-01 RX ORDER — POTASSIUM CHLORIDE 750 MG/1
10 TABLET, FILM COATED, EXTENDED RELEASE ORAL DAILY
Qty: 30 TAB | Refills: 11 | Status: CANCELLED | OUTPATIENT
Start: 2020-01-01

## 2020-01-01 RX ORDER — SUCRALFATE 1 G/1
1 TABLET ORAL 4 TIMES DAILY
Qty: 120 TAB | Refills: 0 | Status: SHIPPED | OUTPATIENT
Start: 2020-01-01 | End: 2021-01-01

## 2020-01-01 RX ORDER — MORPHINE SULFATE 10 MG/ML
2 INJECTION, SOLUTION INTRAMUSCULAR; INTRAVENOUS
Status: DISCONTINUED | OUTPATIENT
Start: 2020-01-01 | End: 2020-01-01 | Stop reason: HOSPADM

## 2020-01-01 RX ORDER — HYDRALAZINE HYDROCHLORIDE 20 MG/ML
20 INJECTION INTRAMUSCULAR; INTRAVENOUS
Status: DISCONTINUED | OUTPATIENT
Start: 2020-01-01 | End: 2020-01-01 | Stop reason: HOSPADM

## 2020-01-01 RX ORDER — BUMETANIDE 1 MG/1
1 TABLET ORAL 2 TIMES DAILY
Qty: 60 TAB | Refills: 0 | Status: ON HOLD
Start: 2020-01-01 | End: 2020-01-01 | Stop reason: SDUPTHER

## 2020-01-01 RX ORDER — SERTRALINE HYDROCHLORIDE 25 MG/1
25 TABLET, FILM COATED ORAL DAILY
COMMUNITY
Start: 2020-01-01

## 2020-01-01 RX ORDER — SODIUM CHLORIDE 9 MG/ML
INJECTION, SOLUTION INTRAVENOUS
Status: DISCONTINUED | OUTPATIENT
Start: 2020-01-01 | End: 2020-01-01 | Stop reason: HOSPADM

## 2020-01-01 RX ORDER — OXYCODONE AND ACETAMINOPHEN 5; 325 MG/1; MG/1
2 TABLET ORAL
Qty: 20 TAB | Refills: 0 | Status: SHIPPED | OUTPATIENT
Start: 2020-01-01 | End: 2020-01-01

## 2020-01-01 RX ORDER — FLUCONAZOLE 150 MG/1
TABLET ORAL
Qty: 2 TAB | Refills: 0 | Status: ON HOLD | OUTPATIENT
Start: 2020-01-01 | End: 2020-01-01 | Stop reason: ALTCHOICE

## 2020-01-01 RX ORDER — SODIUM CHLORIDE, SODIUM LACTATE, POTASSIUM CHLORIDE, CALCIUM CHLORIDE 600; 310; 30; 20 MG/100ML; MG/100ML; MG/100ML; MG/100ML
100 INJECTION, SOLUTION INTRAVENOUS CONTINUOUS
Status: DISCONTINUED | OUTPATIENT
Start: 2020-01-01 | End: 2020-01-01

## 2020-01-01 RX ORDER — BUSPIRONE HYDROCHLORIDE 5 MG/1
10 TABLET ORAL 2 TIMES DAILY
Status: DISCONTINUED | OUTPATIENT
Start: 2020-01-01 | End: 2020-01-01 | Stop reason: HOSPADM

## 2020-01-01 RX ORDER — BUMETANIDE 0.25 MG/ML
2 INJECTION INTRAMUSCULAR; INTRAVENOUS
Status: COMPLETED | OUTPATIENT
Start: 2020-01-01 | End: 2020-01-01

## 2020-01-01 RX ORDER — BUMETANIDE 0.25 MG/ML
1 INJECTION INTRAMUSCULAR; INTRAVENOUS 2 TIMES DAILY
Status: DISCONTINUED | OUTPATIENT
Start: 2020-01-01 | End: 2020-01-01

## 2020-01-01 RX ORDER — SODIUM CHLORIDE, SODIUM LACTATE, POTASSIUM CHLORIDE, CALCIUM CHLORIDE 600; 310; 30; 20 MG/100ML; MG/100ML; MG/100ML; MG/100ML
75 INJECTION, SOLUTION INTRAVENOUS CONTINUOUS
Status: DISCONTINUED | OUTPATIENT
Start: 2020-01-01 | End: 2020-01-01 | Stop reason: HOSPADM

## 2020-01-01 RX ORDER — EPHEDRINE SULFATE/0.9% NACL/PF 50 MG/5 ML
5 SYRINGE (ML) INTRAVENOUS AS NEEDED
Status: DISCONTINUED | OUTPATIENT
Start: 2020-01-01 | End: 2020-01-01 | Stop reason: HOSPADM

## 2020-01-01 RX ORDER — ACETAMINOPHEN 650 MG/1
650 SUPPOSITORY RECTAL
Status: DISCONTINUED | OUTPATIENT
Start: 2020-01-01 | End: 2020-01-01 | Stop reason: HOSPADM

## 2020-01-01 RX ORDER — WARFARIN SODIUM 5 MG/1
5 TABLET ORAL ONCE
Status: COMPLETED | OUTPATIENT
Start: 2020-01-01 | End: 2020-01-01

## 2020-01-01 RX ORDER — LEVOFLOXACIN 5 MG/ML
750 INJECTION, SOLUTION INTRAVENOUS
Status: DISCONTINUED | OUTPATIENT
Start: 2020-01-01 | End: 2020-01-01

## 2020-01-01 RX ORDER — ONDANSETRON 2 MG/ML
4 INJECTION INTRAMUSCULAR; INTRAVENOUS AS NEEDED
Status: DISCONTINUED | OUTPATIENT
Start: 2020-01-01 | End: 2020-01-01 | Stop reason: HOSPADM

## 2020-01-01 RX ORDER — OXYCODONE AND ACETAMINOPHEN 5; 325 MG/1; MG/1
1 TABLET ORAL
Status: DISCONTINUED | OUTPATIENT
Start: 2020-01-01 | End: 2020-01-01 | Stop reason: HOSPADM

## 2020-01-01 RX ORDER — SODIUM CHLORIDE 9 MG/ML
25 INJECTION, SOLUTION INTRAVENOUS CONTINUOUS
Status: DISCONTINUED | OUTPATIENT
Start: 2020-01-01 | End: 2020-01-01

## 2020-01-01 RX ORDER — DILTIAZEM HYDROCHLORIDE 180 MG/1
180 CAPSULE, EXTENDED RELEASE ORAL DAILY
COMMUNITY
Start: 2020-01-01 | End: 2020-01-01

## 2020-01-01 RX ORDER — GABAPENTIN 100 MG/1
100 CAPSULE ORAL 2 TIMES DAILY
Status: DISCONTINUED | OUTPATIENT
Start: 2020-01-01 | End: 2020-01-01

## 2020-01-01 RX ORDER — COLCHICINE 0.6 MG/1
0.6 TABLET ORAL DAILY
Status: DISCONTINUED | OUTPATIENT
Start: 2020-01-01 | End: 2020-01-01

## 2020-01-01 RX ORDER — BUMETANIDE 1 MG/1
0.5 TABLET ORAL 2 TIMES DAILY
Status: DISCONTINUED | OUTPATIENT
Start: 2020-01-01 | End: 2020-01-01 | Stop reason: HOSPADM

## 2020-01-01 RX ORDER — SODIUM CHLORIDE 0.9 % (FLUSH) 0.9 %
5-40 SYRINGE (ML) INJECTION AS NEEDED
Status: DISCONTINUED | OUTPATIENT
Start: 2020-01-01 | End: 2020-01-01 | Stop reason: HOSPADM

## 2020-01-01 RX ORDER — WARFARIN 1 MG/1
TABLET ORAL
Status: DISPENSED
Start: 2020-01-01 | End: 2020-01-01

## 2020-01-01 RX ORDER — FENTANYL CITRATE 50 UG/ML
50 INJECTION, SOLUTION INTRAMUSCULAR; INTRAVENOUS AS NEEDED
Status: DISCONTINUED | OUTPATIENT
Start: 2020-01-01 | End: 2020-01-01 | Stop reason: HOSPADM

## 2020-01-01 RX ORDER — WARFARIN 1 MG/1
TABLET ORAL
Qty: 45 TAB | Refills: 2 | Status: SHIPPED | OUTPATIENT
Start: 2020-01-01 | End: 2021-01-01 | Stop reason: SDUPTHER

## 2020-01-01 RX ORDER — POTASSIUM CHLORIDE 750 MG/1
20 TABLET, FILM COATED, EXTENDED RELEASE ORAL DAILY
COMMUNITY
End: 2020-01-01

## 2020-01-01 RX ORDER — FENTANYL CITRATE 50 UG/ML
INJECTION, SOLUTION INTRAMUSCULAR; INTRAVENOUS AS NEEDED
Status: DISCONTINUED | OUTPATIENT
Start: 2020-01-01 | End: 2020-01-01 | Stop reason: HOSPADM

## 2020-01-01 RX ORDER — WARFARIN 2.5 MG/1
2.5 TABLET ORAL ONCE
Status: ACTIVE | OUTPATIENT
Start: 2020-01-01 | End: 2020-01-01

## 2020-01-01 RX ORDER — LIDOCAINE HYDROCHLORIDE 20 MG/ML
20 INJECTION, SOLUTION INFILTRATION; PERINEURAL ONCE
Status: DISCONTINUED | OUTPATIENT
Start: 2020-01-01 | End: 2020-01-01 | Stop reason: HOSPADM

## 2020-01-01 RX ORDER — POTASSIUM CHLORIDE 750 MG/1
20 TABLET, FILM COATED, EXTENDED RELEASE ORAL
Status: COMPLETED | OUTPATIENT
Start: 2020-01-01 | End: 2020-01-01

## 2020-01-01 RX ORDER — MAGNESIUM SULFATE 100 %
4 CRYSTALS MISCELLANEOUS AS NEEDED
Status: DISCONTINUED | OUTPATIENT
Start: 2020-01-01 | End: 2020-01-01 | Stop reason: HOSPADM

## 2020-01-01 RX ORDER — WARFARIN 1 MG/1
3 TABLET ORAL
Status: DISCONTINUED | OUTPATIENT
Start: 2020-01-01 | End: 2020-01-01

## 2020-01-01 RX ORDER — SERTRALINE HYDROCHLORIDE 50 MG/1
125 TABLET, FILM COATED ORAL DAILY
Status: DISCONTINUED | OUTPATIENT
Start: 2020-01-01 | End: 2020-01-01 | Stop reason: HOSPADM

## 2020-01-01 RX ORDER — LIDOCAINE HYDROCHLORIDE 10 MG/ML
INJECTION INFILTRATION; PERINEURAL AS NEEDED
Status: DISCONTINUED | OUTPATIENT
Start: 2020-01-01 | End: 2020-01-01 | Stop reason: HOSPADM

## 2020-01-01 RX ORDER — VANCOMYCIN/0.9 % SOD CHLORIDE 1.5G/250ML
1500 PLASTIC BAG, INJECTION (ML) INTRAVENOUS ONCE
Status: DISCONTINUED | OUTPATIENT
Start: 2020-01-01 | End: 2020-01-01 | Stop reason: HOSPADM

## 2020-01-01 RX ORDER — PROPOFOL 10 MG/ML
INJECTION, EMULSION INTRAVENOUS
Status: DISCONTINUED | OUTPATIENT
Start: 2020-01-01 | End: 2020-01-01 | Stop reason: HOSPADM

## 2020-01-01 RX ORDER — WARFARIN 1 MG/1
2 TABLET ORAL ONCE
Status: COMPLETED | OUTPATIENT
Start: 2020-01-01 | End: 2020-01-01

## 2020-01-01 RX ORDER — WARFARIN 2 MG/1
2 TABLET ORAL
Status: COMPLETED | OUTPATIENT
Start: 2020-01-01 | End: 2020-01-01

## 2020-01-01 RX ORDER — ENOXAPARIN SODIUM 150 MG/ML
111 INJECTION SUBCUTANEOUS EVERY 12 HOURS
Status: DISCONTINUED | OUTPATIENT
Start: 2020-01-01 | End: 2020-01-01 | Stop reason: ALTCHOICE

## 2020-01-01 RX ORDER — HYDRALAZINE HYDROCHLORIDE 25 MG/1
25 TABLET, FILM COATED ORAL 3 TIMES DAILY
Status: DISCONTINUED | OUTPATIENT
Start: 2020-01-01 | End: 2020-01-01

## 2020-01-01 RX ORDER — OXYCODONE HYDROCHLORIDE 5 MG/1
5 TABLET ORAL AS NEEDED
Status: DISCONTINUED | OUTPATIENT
Start: 2020-01-01 | End: 2020-01-01 | Stop reason: HOSPADM

## 2020-01-01 RX ORDER — SODIUM CHLORIDE 9 MG/ML
25 INJECTION, SOLUTION INTRAVENOUS CONTINUOUS
Status: DISCONTINUED | OUTPATIENT
Start: 2020-01-01 | End: 2020-01-01 | Stop reason: HOSPADM

## 2020-01-01 RX ORDER — HEPARIN SODIUM 5000 [USP'U]/ML
4000 INJECTION, SOLUTION INTRAVENOUS; SUBCUTANEOUS AS NEEDED
Status: DISCONTINUED | OUTPATIENT
Start: 2020-01-01 | End: 2020-01-01 | Stop reason: ALTCHOICE

## 2020-01-01 RX ORDER — SODIUM CHLORIDE, SODIUM LACTATE, POTASSIUM CHLORIDE, CALCIUM CHLORIDE 600; 310; 30; 20 MG/100ML; MG/100ML; MG/100ML; MG/100ML
125 INJECTION, SOLUTION INTRAVENOUS CONTINUOUS
Status: DISCONTINUED | OUTPATIENT
Start: 2020-01-01 | End: 2020-01-01 | Stop reason: HOSPADM

## 2020-01-01 RX ORDER — LIDOCAINE HYDROCHLORIDE 20 MG/ML
INJECTION, SOLUTION INFILTRATION; PERINEURAL
Status: DISPENSED
Start: 2020-01-01 | End: 2020-01-01

## 2020-01-01 RX ORDER — SODIUM CHLORIDE 0.9 % (FLUSH) 0.9 %
10 SYRINGE (ML) INJECTION AS NEEDED
Status: DISCONTINUED | OUTPATIENT
Start: 2020-01-01 | End: 2020-01-01

## 2020-01-01 RX ORDER — CEPHALEXIN 500 MG/1
500 CAPSULE ORAL 4 TIMES DAILY
Qty: 28 CAP | Refills: 0 | Status: SHIPPED | OUTPATIENT
Start: 2020-01-01 | End: 2020-01-01

## 2020-01-01 RX ORDER — WARFARIN 1 MG/1
1 TABLET ORAL ONCE
Status: COMPLETED | OUTPATIENT
Start: 2020-01-01 | End: 2020-01-01

## 2020-01-01 RX ORDER — WARFARIN 2.5 MG/1
2.5 TABLET ORAL ONCE
Status: COMPLETED | OUTPATIENT
Start: 2020-01-01 | End: 2020-01-01

## 2020-01-01 RX ORDER — POTASSIUM CHLORIDE 14.9 MG/ML
10 INJECTION INTRAVENOUS
Status: COMPLETED | OUTPATIENT
Start: 2020-01-01 | End: 2020-01-01

## 2020-01-01 RX ORDER — ROCURONIUM BROMIDE 10 MG/ML
INJECTION, SOLUTION INTRAVENOUS AS NEEDED
Status: DISCONTINUED | OUTPATIENT
Start: 2020-01-01 | End: 2020-01-01 | Stop reason: HOSPADM

## 2020-01-01 RX ORDER — SODIUM CHLORIDE 9 MG/ML
500 INJECTION, SOLUTION INTRAVENOUS CONTINUOUS
Status: DISCONTINUED | OUTPATIENT
Start: 2020-01-01 | End: 2020-01-01

## 2020-01-01 RX ORDER — POTASSIUM CHLORIDE 20 MEQ/1
20 TABLET, EXTENDED RELEASE ORAL DAILY
Qty: 30 TAB | Refills: 5 | Status: SHIPPED | OUTPATIENT
Start: 2020-01-01 | End: 2020-01-01

## 2020-01-01 RX ORDER — FENTANYL CITRATE/PF 1500MCG/30
PATIENT CONTROLLED ANALGESIA SYRINGE INTRAVENOUS
Status: DISCONTINUED | OUTPATIENT
Start: 2020-01-01 | End: 2020-01-01

## 2020-01-01 RX ORDER — HYDROMORPHONE HYDROCHLORIDE 1 MG/ML
0.2 INJECTION, SOLUTION INTRAMUSCULAR; INTRAVENOUS; SUBCUTANEOUS
Status: DISCONTINUED | OUTPATIENT
Start: 2020-01-01 | End: 2020-01-01 | Stop reason: HOSPADM

## 2020-01-01 RX ORDER — BUMETANIDE 1 MG/1
0.5 TABLET ORAL 2 TIMES DAILY
Status: DISCONTINUED | OUTPATIENT
Start: 2020-01-01 | End: 2020-01-01

## 2020-01-01 RX ORDER — MIDAZOLAM HYDROCHLORIDE 1 MG/ML
.5-2 INJECTION, SOLUTION INTRAMUSCULAR; INTRAVENOUS
Status: DISCONTINUED | OUTPATIENT
Start: 2020-01-01 | End: 2020-01-01

## 2020-01-01 RX ORDER — SERTRALINE HYDROCHLORIDE 50 MG/1
25 TABLET, FILM COATED ORAL DAILY
Status: DISCONTINUED | OUTPATIENT
Start: 2020-01-01 | End: 2020-01-01

## 2020-01-01 RX ORDER — DOCUSATE SODIUM 100 MG/1
100 CAPSULE, LIQUID FILLED ORAL 2 TIMES DAILY
Status: DISCONTINUED | OUTPATIENT
Start: 2020-01-01 | End: 2020-01-01 | Stop reason: HOSPADM

## 2020-01-01 RX ORDER — VANCOMYCIN HYDROCHLORIDE
1250 EVERY 24 HOURS
Status: DISCONTINUED | OUTPATIENT
Start: 2020-01-01 | End: 2020-01-01 | Stop reason: DRUGHIGH

## 2020-01-01 RX ORDER — PANTOPRAZOLE SODIUM 40 MG/1
40 TABLET, DELAYED RELEASE ORAL
Status: DISCONTINUED | OUTPATIENT
Start: 2020-01-01 | End: 2020-01-01 | Stop reason: HOSPADM

## 2020-01-01 RX ORDER — DIPHENHYDRAMINE HYDROCHLORIDE 50 MG/ML
INJECTION, SOLUTION INTRAMUSCULAR; INTRAVENOUS AS NEEDED
Status: DISCONTINUED | OUTPATIENT
Start: 2020-01-01 | End: 2020-01-01 | Stop reason: HOSPADM

## 2020-01-01 RX ORDER — FUROSEMIDE 10 MG/ML
40 INJECTION INTRAMUSCULAR; INTRAVENOUS
Status: DISCONTINUED | OUTPATIENT
Start: 2020-01-01 | End: 2020-01-01

## 2020-01-01 RX ORDER — MIDAZOLAM HYDROCHLORIDE 1 MG/ML
5 INJECTION, SOLUTION INTRAMUSCULAR; INTRAVENOUS
Status: DISCONTINUED | OUTPATIENT
Start: 2020-01-01 | End: 2020-01-01 | Stop reason: HOSPADM

## 2020-01-01 RX ORDER — BUMETANIDE 1 MG/1
1 TABLET ORAL DAILY
Status: DISCONTINUED | OUTPATIENT
Start: 2020-01-01 | End: 2020-01-01 | Stop reason: HOSPADM

## 2020-01-01 RX ORDER — TRAMADOL HYDROCHLORIDE 50 MG/1
50 TABLET ORAL
Status: DISCONTINUED | OUTPATIENT
Start: 2020-01-01 | End: 2020-01-01 | Stop reason: HOSPADM

## 2020-01-01 RX ORDER — FUROSEMIDE 10 MG/ML
60 INJECTION INTRAMUSCULAR; INTRAVENOUS
Status: COMPLETED | OUTPATIENT
Start: 2020-01-01 | End: 2020-01-01

## 2020-01-01 RX ORDER — ACETAMINOPHEN 325 MG/1
650 TABLET ORAL ONCE
Status: DISCONTINUED | OUTPATIENT
Start: 2020-01-01 | End: 2020-01-01 | Stop reason: HOSPADM

## 2020-01-01 RX ORDER — NALOXONE HYDROCHLORIDE 0.4 MG/ML
0.1 INJECTION, SOLUTION INTRAMUSCULAR; INTRAVENOUS; SUBCUTANEOUS AS NEEDED
Status: DISCONTINUED | OUTPATIENT
Start: 2020-01-01 | End: 2020-01-01 | Stop reason: HOSPADM

## 2020-01-01 RX ORDER — SCOLOPAMINE TRANSDERMAL SYSTEM 1 MG/1
PATCH, EXTENDED RELEASE TRANSDERMAL AS NEEDED
Status: DISCONTINUED | OUTPATIENT
Start: 2020-01-01 | End: 2020-01-01 | Stop reason: HOSPADM

## 2020-01-01 RX ORDER — SODIUM CHLORIDE, SODIUM LACTATE, POTASSIUM CHLORIDE, CALCIUM CHLORIDE 600; 310; 30; 20 MG/100ML; MG/100ML; MG/100ML; MG/100ML
25 INJECTION, SOLUTION INTRAVENOUS CONTINUOUS
Status: DISCONTINUED | OUTPATIENT
Start: 2020-01-01 | End: 2020-01-01

## 2020-01-01 RX ORDER — ENOXAPARIN SODIUM 100 MG/ML
1 INJECTION SUBCUTANEOUS EVERY 12 HOURS
Status: DISCONTINUED | OUTPATIENT
Start: 2020-01-01 | End: 2020-01-01

## 2020-01-01 RX ORDER — GLYCOPYRROLATE 0.2 MG/ML
INJECTION INTRAMUSCULAR; INTRAVENOUS AS NEEDED
Status: DISCONTINUED | OUTPATIENT
Start: 2020-01-01 | End: 2020-01-01 | Stop reason: HOSPADM

## 2020-01-01 RX ORDER — LABETALOL HYDROCHLORIDE 5 MG/ML
10 INJECTION, SOLUTION INTRAVENOUS ONCE
Status: COMPLETED | OUTPATIENT
Start: 2020-01-01 | End: 2020-01-01

## 2020-01-01 RX ORDER — HYDROMORPHONE HYDROCHLORIDE 2 MG/ML
INJECTION, SOLUTION INTRAMUSCULAR; INTRAVENOUS; SUBCUTANEOUS AS NEEDED
Status: DISCONTINUED | OUTPATIENT
Start: 2020-01-01 | End: 2020-01-01 | Stop reason: HOSPADM

## 2020-01-01 RX ORDER — KETOROLAC TROMETHAMINE 30 MG/ML
30 INJECTION, SOLUTION INTRAMUSCULAR; INTRAVENOUS ONCE
Status: COMPLETED | OUTPATIENT
Start: 2020-01-01 | End: 2020-01-01

## 2020-01-01 RX ORDER — HEPARIN SODIUM 5000 [USP'U]/ML
4000 INJECTION, SOLUTION INTRAVENOUS; SUBCUTANEOUS ONCE
Status: COMPLETED | OUTPATIENT
Start: 2020-01-01 | End: 2020-01-01

## 2020-01-01 RX ORDER — HYDROMORPHONE HYDROCHLORIDE 1 MG/ML
0.5 INJECTION, SOLUTION INTRAMUSCULAR; INTRAVENOUS; SUBCUTANEOUS
Status: DISCONTINUED | OUTPATIENT
Start: 2020-01-01 | End: 2020-01-01 | Stop reason: HOSPADM

## 2020-01-01 RX ORDER — MIDAZOLAM HYDROCHLORIDE 1 MG/ML
5 INJECTION, SOLUTION INTRAMUSCULAR; INTRAVENOUS
Status: DISCONTINUED | OUTPATIENT
Start: 2020-01-01 | End: 2020-01-01

## 2020-01-01 RX ORDER — VANCOMYCIN 2 GRAM/500 ML IN 0.9 % SODIUM CHLORIDE INTRAVENOUS
2000 ONCE
Status: COMPLETED | OUTPATIENT
Start: 2020-01-01 | End: 2020-01-01

## 2020-01-01 RX ORDER — SODIUM CHLORIDE 0.9 % (FLUSH) 0.9 %
5-40 SYRINGE (ML) INJECTION EVERY 8 HOURS
Status: DISCONTINUED | OUTPATIENT
Start: 2020-01-01 | End: 2020-01-01

## 2020-01-01 RX ORDER — HYDROMORPHONE HYDROCHLORIDE 1 MG/ML
INJECTION, SOLUTION INTRAMUSCULAR; INTRAVENOUS; SUBCUTANEOUS
Status: DISPENSED
Start: 2020-01-01 | End: 2020-01-01

## 2020-01-01 RX ORDER — BUSPIRONE HYDROCHLORIDE 5 MG/1
TABLET ORAL
Status: DISPENSED
Start: 2020-01-01 | End: 2020-01-01

## 2020-01-01 RX ORDER — HYDRALAZINE HYDROCHLORIDE 50 MG/1
50 TABLET, FILM COATED ORAL 3 TIMES DAILY
Status: DISCONTINUED | OUTPATIENT
Start: 2020-01-01 | End: 2020-01-01 | Stop reason: HOSPADM

## 2020-01-01 RX ORDER — ACETAMINOPHEN 325 MG/1
650 TABLET ORAL
Status: DISCONTINUED | OUTPATIENT
Start: 2020-01-01 | End: 2020-01-01 | Stop reason: HOSPADM

## 2020-01-01 RX ORDER — HYDROMORPHONE HYDROCHLORIDE 1 MG/ML
0.2 INJECTION, SOLUTION INTRAMUSCULAR; INTRAVENOUS; SUBCUTANEOUS
Status: ACTIVE | OUTPATIENT
Start: 2020-01-01 | End: 2020-01-01

## 2020-01-01 RX ORDER — PROTAMINE SULFATE 10 MG/ML
INJECTION, SOLUTION INTRAVENOUS AS NEEDED
Status: DISCONTINUED | OUTPATIENT
Start: 2020-01-01 | End: 2020-01-01 | Stop reason: HOSPADM

## 2020-01-01 RX ORDER — NYSTATIN 100000 U/G
CREAM TOPICAL 2 TIMES DAILY
Status: DISCONTINUED | OUTPATIENT
Start: 2020-01-01 | End: 2020-01-01 | Stop reason: HOSPADM

## 2020-01-01 RX ORDER — POTASSIUM CHLORIDE 750 MG/1
30 TABLET, FILM COATED, EXTENDED RELEASE ORAL
Status: COMPLETED | OUTPATIENT
Start: 2020-01-01 | End: 2020-01-01

## 2020-01-01 RX ORDER — GENTAMICIN SULFATE 80 MG/100ML
80 INJECTION, SOLUTION INTRAVENOUS ONCE
Status: DISCONTINUED | OUTPATIENT
Start: 2020-01-01 | End: 2020-01-01 | Stop reason: HOSPADM

## 2020-01-01 RX ORDER — INSULIN GLARGINE 100 [IU]/ML
10 INJECTION, SOLUTION SUBCUTANEOUS
Status: DISCONTINUED | OUTPATIENT
Start: 2020-01-01 | End: 2020-01-01 | Stop reason: HOSPADM

## 2020-01-01 RX ORDER — FLUCONAZOLE 100 MG/1
200 TABLET ORAL ONCE
Status: COMPLETED | OUTPATIENT
Start: 2020-01-01 | End: 2020-01-01

## 2020-01-01 RX ORDER — SODIUM CHLORIDE 0.9 % (FLUSH) 0.9 %
5-40 SYRINGE (ML) INJECTION EVERY 8 HOURS
Status: CANCELLED | OUTPATIENT
Start: 2020-01-01

## 2020-01-01 RX ORDER — ONDANSETRON 4 MG/1
4 TABLET, ORALLY DISINTEGRATING ORAL
Status: DISCONTINUED | OUTPATIENT
Start: 2020-01-01 | End: 2020-01-01 | Stop reason: HOSPADM

## 2020-01-01 RX ORDER — EPINEPHRINE 0.1 MG/ML
1 INJECTION INTRACARDIAC; INTRAVENOUS
Status: DISCONTINUED | OUTPATIENT
Start: 2020-01-01 | End: 2020-01-01 | Stop reason: HOSPADM

## 2020-01-01 RX ORDER — HEPARIN 100 UNIT/ML
300 SYRINGE INTRAVENOUS AS NEEDED
Status: DISCONTINUED | OUTPATIENT
Start: 2020-01-01 | End: 2020-01-01

## 2020-01-01 RX ORDER — HYDROMORPHONE HCL/0.9% NACL/PF 0.5 MG/ML
PLASTIC BAG, INJECTION (ML) INTRAVENOUS CONTINUOUS
Status: DISCONTINUED | OUTPATIENT
Start: 2020-01-01 | End: 2020-01-01 | Stop reason: SDUPTHER

## 2020-01-01 RX ORDER — SERTRALINE HYDROCHLORIDE 50 MG/1
75 TABLET, FILM COATED ORAL DAILY
Status: DISCONTINUED | OUTPATIENT
Start: 2020-01-01 | End: 2020-01-01 | Stop reason: HOSPADM

## 2020-01-01 RX ORDER — NITROGLYCERIN 0.4 MG/1
0.4 TABLET SUBLINGUAL
Status: COMPLETED | OUTPATIENT
Start: 2020-01-01 | End: 2020-01-01

## 2020-01-01 RX ORDER — BACITRACIN 50000 [IU]/1
INJECTION, POWDER, FOR SOLUTION INTRAMUSCULAR AS NEEDED
Status: DISCONTINUED | OUTPATIENT
Start: 2020-01-01 | End: 2020-01-01 | Stop reason: HOSPADM

## 2020-01-01 RX ORDER — WARFARIN 1 MG/1
1 TABLET ORAL ONCE
Status: DISCONTINUED | OUTPATIENT
Start: 2020-01-01 | End: 2020-01-01 | Stop reason: HOSPADM

## 2020-01-01 RX ORDER — METOPROLOL TARTRATE 25 MG/1
25 TABLET, FILM COATED ORAL 2 TIMES DAILY
Status: DISCONTINUED | OUTPATIENT
Start: 2020-01-01 | End: 2020-01-01

## 2020-01-01 RX ORDER — PANTOPRAZOLE SODIUM 40 MG/1
40 TABLET, DELAYED RELEASE ORAL
Qty: 60 TAB | Refills: 1 | Status: SHIPPED | OUTPATIENT
Start: 2020-01-01

## 2020-01-01 RX ORDER — FENTANYL CITRATE 50 UG/ML
100 INJECTION, SOLUTION INTRAMUSCULAR; INTRAVENOUS
Status: DISCONTINUED | OUTPATIENT
Start: 2020-01-01 | End: 2020-01-01 | Stop reason: HOSPADM

## 2020-01-01 RX ORDER — FERROUS SULFATE 324(65)MG
324 TABLET, DELAYED RELEASE (ENTERIC COATED) ORAL
Qty: 30 TAB | Refills: 0 | Status: SHIPPED | OUTPATIENT
Start: 2020-01-01 | End: 2020-01-01

## 2020-01-01 RX ORDER — INSULIN LISPRO 100 [IU]/ML
INJECTION, SOLUTION INTRAVENOUS; SUBCUTANEOUS
Status: DISCONTINUED | OUTPATIENT
Start: 2020-01-01 | End: 2020-01-01 | Stop reason: HOSPADM

## 2020-01-01 RX ORDER — SODIUM CHLORIDE, SODIUM LACTATE, POTASSIUM CHLORIDE, CALCIUM CHLORIDE 600; 310; 30; 20 MG/100ML; MG/100ML; MG/100ML; MG/100ML
INJECTION, SOLUTION INTRAVENOUS
Status: DISCONTINUED | OUTPATIENT
Start: 2020-01-01 | End: 2020-01-01 | Stop reason: HOSPADM

## 2020-01-01 RX ORDER — WARFARIN 2 MG/1
TABLET ORAL
Qty: 50 TAB | Refills: 5
Start: 2020-01-01 | End: 2020-01-01

## 2020-01-01 RX ORDER — DIPHENHYDRAMINE HYDROCHLORIDE 50 MG/ML
12.5 INJECTION, SOLUTION INTRAMUSCULAR; INTRAVENOUS
Status: DISCONTINUED | OUTPATIENT
Start: 2020-01-01 | End: 2020-01-01

## 2020-01-01 RX ORDER — COLCHICINE 0.6 MG/1
0.6 TABLET ORAL 2 TIMES DAILY
Status: DISCONTINUED | OUTPATIENT
Start: 2020-01-01 | End: 2020-01-01

## 2020-01-01 RX ORDER — SODIUM CHLORIDE 9 MG/ML
80 INJECTION, SOLUTION INTRAVENOUS CONTINUOUS
Status: DISPENSED | OUTPATIENT
Start: 2020-01-01 | End: 2020-01-01

## 2020-01-01 RX ORDER — DILTIAZEM HYDROCHLORIDE 30 MG/1
60 TABLET, FILM COATED ORAL
Status: COMPLETED | OUTPATIENT
Start: 2020-01-01 | End: 2020-01-01

## 2020-01-01 RX ORDER — GUAIFENESIN 100 MG/5ML
324 LIQUID (ML) ORAL
Status: COMPLETED | OUTPATIENT
Start: 2020-01-01 | End: 2020-01-01

## 2020-01-01 RX ORDER — WARFARIN 1 MG/1
1 TABLET ORAL
Status: DISCONTINUED | OUTPATIENT
Start: 2020-01-01 | End: 2020-01-01

## 2020-01-01 RX ORDER — POTASSIUM CHLORIDE 750 MG/1
20 TABLET, FILM COATED, EXTENDED RELEASE ORAL DAILY
Status: DISCONTINUED | OUTPATIENT
Start: 2020-01-01 | End: 2020-01-01 | Stop reason: HOSPADM

## 2020-01-01 RX ORDER — ATROPINE SULFATE 0.1 MG/ML
0.5 INJECTION INTRAVENOUS
Status: DISCONTINUED | OUTPATIENT
Start: 2020-01-01 | End: 2020-01-01 | Stop reason: HOSPADM

## 2020-01-01 RX ORDER — FLUCONAZOLE 100 MG/1
200 TABLET ORAL DAILY
Status: COMPLETED | OUTPATIENT
Start: 2020-01-01 | End: 2020-01-01

## 2020-01-01 RX ORDER — ONDANSETRON 4 MG/1
4 TABLET, ORALLY DISINTEGRATING ORAL
Qty: 30 TAB | Refills: 2 | Status: SHIPPED | OUTPATIENT
Start: 2020-01-01 | End: 2020-01-01

## 2020-01-01 RX ORDER — PROMETHAZINE HYDROCHLORIDE 25 MG/1
12.5 TABLET ORAL
Status: DISCONTINUED | OUTPATIENT
Start: 2020-01-01 | End: 2020-01-01 | Stop reason: HOSPADM

## 2020-01-01 RX ORDER — POTASSIUM CHLORIDE 750 MG/1
40 TABLET, FILM COATED, EXTENDED RELEASE ORAL
Status: DISCONTINUED | OUTPATIENT
Start: 2020-01-01 | End: 2020-01-01 | Stop reason: SDUPTHER

## 2020-01-01 RX ORDER — BUMETANIDE 1 MG/1
2 TABLET ORAL DAILY
COMMUNITY
End: 2020-01-01

## 2020-01-01 RX ORDER — SUCRALFATE 1 G/10ML
1 SUSPENSION ORAL 4 TIMES DAILY
Qty: 560 ML | Refills: 0 | Status: SHIPPED | OUTPATIENT
Start: 2020-01-01 | End: 2020-01-01

## 2020-01-01 RX ORDER — ENOXAPARIN SODIUM 100 MG/ML
40 INJECTION SUBCUTANEOUS EVERY 24 HOURS
Status: DISCONTINUED | OUTPATIENT
Start: 2020-01-01 | End: 2020-01-01

## 2020-01-01 RX ORDER — HYDROMORPHONE HYDROCHLORIDE 2 MG/1
3 TABLET ORAL
Status: DISCONTINUED | OUTPATIENT
Start: 2020-01-01 | End: 2020-01-01 | Stop reason: HOSPADM

## 2020-01-01 RX ORDER — WARFARIN 7.5 MG/1
7.5 TABLET ORAL ONCE
Status: COMPLETED | OUTPATIENT
Start: 2020-01-01 | End: 2020-01-01

## 2020-01-01 RX ORDER — LEVOFLOXACIN 750 MG/1
750 TABLET ORAL
Status: COMPLETED | OUTPATIENT
Start: 2020-01-01 | End: 2020-01-01

## 2020-01-01 RX ORDER — SODIUM CHLORIDE, SODIUM LACTATE, POTASSIUM CHLORIDE, CALCIUM CHLORIDE 600; 310; 30; 20 MG/100ML; MG/100ML; MG/100ML; MG/100ML
1000 INJECTION, SOLUTION INTRAVENOUS CONTINUOUS
Status: DISCONTINUED | OUTPATIENT
Start: 2020-01-01 | End: 2020-01-01 | Stop reason: HOSPADM

## 2020-01-01 RX ORDER — LIDOCAINE HYDROCHLORIDE 10 MG/ML
20 INJECTION INFILTRATION; PERINEURAL ONCE
Status: COMPLETED | OUTPATIENT
Start: 2020-01-01 | End: 2020-01-01

## 2020-01-01 RX ORDER — POLYETHYLENE GLYCOL 3350 17 G/17G
17 POWDER, FOR SOLUTION ORAL 2 TIMES DAILY
Status: DISCONTINUED | OUTPATIENT
Start: 2020-01-01 | End: 2020-01-01 | Stop reason: HOSPADM

## 2020-01-01 RX ORDER — HYDROMORPHONE HYDROCHLORIDE 2 MG/1
1 TABLET ORAL
Status: DISCONTINUED | OUTPATIENT
Start: 2020-01-01 | End: 2020-01-01

## 2020-01-01 RX ORDER — METOPROLOL TARTRATE 25 MG/1
25 TABLET, FILM COATED ORAL 2 TIMES DAILY
Status: DISCONTINUED | OUTPATIENT
Start: 2020-01-01 | End: 2020-01-01 | Stop reason: HOSPADM

## 2020-01-01 RX ORDER — SUCRALFATE 1 G/1
1 TABLET ORAL
Status: DISCONTINUED | OUTPATIENT
Start: 2020-01-01 | End: 2020-01-01 | Stop reason: HOSPADM

## 2020-01-01 RX ORDER — SODIUM CHLORIDE 9 MG/ML
250 INJECTION, SOLUTION INTRAVENOUS AS NEEDED
Status: DISCONTINUED | OUTPATIENT
Start: 2020-01-01 | End: 2020-01-01 | Stop reason: HOSPADM

## 2020-01-01 RX ORDER — INSULIN GLARGINE 100 [IU]/ML
10 INJECTION, SOLUTION SUBCUTANEOUS
Qty: 10 ML | Refills: 5 | Status: SHIPPED | OUTPATIENT
Start: 2020-01-01 | End: 2020-01-01

## 2020-01-01 RX ORDER — GABAPENTIN 100 MG/1
100 CAPSULE ORAL 3 TIMES DAILY
Status: DISCONTINUED | OUTPATIENT
Start: 2020-01-01 | End: 2020-01-01 | Stop reason: HOSPADM

## 2020-01-01 RX ORDER — LIDOCAINE HYDROCHLORIDE 20 MG/ML
15 SOLUTION OROPHARYNGEAL AS NEEDED
Status: DISCONTINUED | OUTPATIENT
Start: 2020-01-01 | End: 2020-01-01

## 2020-01-01 RX ORDER — ONDANSETRON 4 MG/1
4 TABLET, ORALLY DISINTEGRATING ORAL
Qty: 10 TAB | Refills: 0 | Status: SHIPPED | OUTPATIENT
Start: 2020-01-01 | End: 2020-01-01

## 2020-01-01 RX ORDER — SUCRALFATE 1 G/1
1 TABLET ORAL 4 TIMES DAILY
Status: DISCONTINUED | OUTPATIENT
Start: 2020-01-01 | End: 2020-01-01 | Stop reason: HOSPADM

## 2020-01-01 RX ORDER — HYDRALAZINE HYDROCHLORIDE 100 MG/1
100 TABLET, FILM COATED ORAL 3 TIMES DAILY
Qty: 90 TAB | Refills: 11
Start: 2020-01-01 | End: 2021-01-01

## 2020-01-01 RX ORDER — ROPIVACAINE HYDROCHLORIDE 5 MG/ML
150 INJECTION, SOLUTION EPIDURAL; INFILTRATION; PERINEURAL AS NEEDED
Status: DISCONTINUED | OUTPATIENT
Start: 2020-01-01 | End: 2020-01-01 | Stop reason: HOSPADM

## 2020-01-01 RX ORDER — HYDROMORPHONE HYDROCHLORIDE 2 MG/1
2 TABLET ORAL
Status: DISCONTINUED | OUTPATIENT
Start: 2020-01-01 | End: 2020-01-01 | Stop reason: HOSPADM

## 2020-01-01 RX ORDER — NALOXONE HYDROCHLORIDE 0.4 MG/ML
0.4 INJECTION, SOLUTION INTRAMUSCULAR; INTRAVENOUS; SUBCUTANEOUS
Status: DISCONTINUED | OUTPATIENT
Start: 2020-01-01 | End: 2020-01-01 | Stop reason: HOSPADM

## 2020-01-01 RX ORDER — DILTIAZEM HYDROCHLORIDE 5 MG/ML
10 INJECTION INTRAVENOUS ONCE
Status: COMPLETED | OUTPATIENT
Start: 2020-01-01 | End: 2020-01-01

## 2020-01-01 RX ORDER — OXYCODONE AND ACETAMINOPHEN 5; 325 MG/1; MG/1
2 TABLET ORAL
Status: DISCONTINUED | OUTPATIENT
Start: 2020-01-01 | End: 2020-01-01 | Stop reason: HOSPADM

## 2020-01-01 RX ORDER — HYDROMORPHONE HYDROCHLORIDE 2 MG/1
TABLET ORAL
Status: COMPLETED
Start: 2020-01-01 | End: 2020-01-01

## 2020-01-01 RX ORDER — HEPARIN SODIUM 5000 [USP'U]/ML
80 INJECTION, SOLUTION INTRAVENOUS; SUBCUTANEOUS ONCE
Status: COMPLETED | OUTPATIENT
Start: 2020-01-01 | End: 2020-01-01

## 2020-01-01 RX ORDER — BUMETANIDE 0.5 MG/1
0.5 TABLET ORAL 2 TIMES DAILY
Qty: 60 TAB | Refills: 0 | Status: SHIPPED | OUTPATIENT
Start: 2020-01-01 | End: 2020-01-01

## 2020-01-01 RX ORDER — SODIUM CHLORIDE 9 MG/ML
250 INJECTION, SOLUTION INTRAVENOUS AS NEEDED
Status: DISCONTINUED | OUTPATIENT
Start: 2020-01-01 | End: 2020-01-01

## 2020-01-01 RX ORDER — INSULIN GLARGINE 100 [IU]/ML
10 INJECTION, SOLUTION SUBCUTANEOUS
COMMUNITY
End: 2020-01-01 | Stop reason: RX

## 2020-01-01 RX ORDER — DEXTROSE MONOHYDRATE 100 MG/ML
0-250 INJECTION, SOLUTION INTRAVENOUS AS NEEDED
Status: DISCONTINUED | OUTPATIENT
Start: 2020-01-01 | End: 2020-01-01 | Stop reason: HOSPADM

## 2020-01-01 RX ORDER — CARVEDILOL 12.5 MG/1
12.5 TABLET ORAL 2 TIMES DAILY WITH MEALS
Qty: 60 TAB | Refills: 0 | Status: SHIPPED | OUTPATIENT
Start: 2020-01-01 | End: 2021-01-01

## 2020-01-01 RX ORDER — FLUCONAZOLE 150 MG/1
150 TABLET ORAL
Qty: 2 TAB | Refills: 0 | Status: SHIPPED | OUTPATIENT
Start: 2020-01-01 | End: 2020-01-01

## 2020-01-01 RX ORDER — INSULIN GLARGINE 100 [IU]/ML
10 INJECTION, SOLUTION SUBCUTANEOUS
Qty: 10 ML | Refills: 5 | Status: ON HOLD
Start: 2020-01-01 | End: 2020-01-01

## 2020-01-01 RX ORDER — HEPARIN SODIUM 1000 [USP'U]/ML
INJECTION, SOLUTION INTRAVENOUS; SUBCUTANEOUS
Status: DISPENSED
Start: 2020-01-01 | End: 2020-01-01

## 2020-01-01 RX ORDER — WARFARIN 2 MG/1
TABLET ORAL
Qty: 50 TAB | Refills: 5 | Status: SHIPPED | OUTPATIENT
Start: 2020-01-01 | End: 2020-01-01

## 2020-01-01 RX ORDER — BUMETANIDE 1 MG/1
1 TABLET ORAL 2 TIMES DAILY
Status: DISCONTINUED | OUTPATIENT
Start: 2020-01-01 | End: 2020-01-01

## 2020-01-01 RX ORDER — MIDAZOLAM HYDROCHLORIDE 1 MG/ML
.25-5 INJECTION, SOLUTION INTRAMUSCULAR; INTRAVENOUS
Status: DISCONTINUED | OUTPATIENT
Start: 2020-01-01 | End: 2020-01-01 | Stop reason: HOSPADM

## 2020-01-01 RX ORDER — DEXAMETHASONE SODIUM PHOSPHATE 4 MG/ML
INJECTION, SOLUTION INTRA-ARTICULAR; INTRALESIONAL; INTRAMUSCULAR; INTRAVENOUS; SOFT TISSUE AS NEEDED
Status: DISCONTINUED | OUTPATIENT
Start: 2020-01-01 | End: 2020-01-01 | Stop reason: HOSPADM

## 2020-01-01 RX ORDER — ONDANSETRON 2 MG/ML
4 INJECTION INTRAMUSCULAR; INTRAVENOUS
Status: DISCONTINUED | OUTPATIENT
Start: 2020-01-01 | End: 2020-01-01 | Stop reason: SDUPTHER

## 2020-01-01 RX ORDER — WARFARIN 2 MG/1
2 TABLET ORAL ONCE
Status: DISCONTINUED | OUTPATIENT
Start: 2020-01-01 | End: 2020-01-01 | Stop reason: HOSPADM

## 2020-01-01 RX ORDER — LIDOCAINE HYDROCHLORIDE 20 MG/ML
20 INJECTION, SOLUTION INFILTRATION; PERINEURAL ONCE
Status: DISCONTINUED | OUTPATIENT
Start: 2020-01-01 | End: 2020-01-01

## 2020-01-01 RX ORDER — DILTIAZEM HYDROCHLORIDE EXTENDED-RELEASE TABLETS 240 MG/1
240 TABLET, EXTENDED RELEASE ORAL DAILY
Qty: 30 TAB | Refills: 0 | Status: SHIPPED | OUTPATIENT
Start: 2020-01-01 | End: 2020-01-01

## 2020-01-01 RX ORDER — GABAPENTIN 100 MG/1
100 CAPSULE ORAL
Status: DISCONTINUED | OUTPATIENT
Start: 2020-01-01 | End: 2020-01-01 | Stop reason: HOSPADM

## 2020-01-01 RX ORDER — ACETAMINOPHEN 325 MG/1
TABLET ORAL
Status: DISPENSED
Start: 2020-01-01 | End: 2020-01-01

## 2020-01-01 RX ORDER — HEPARIN SODIUM 5000 [USP'U]/ML
4000 INJECTION, SOLUTION INTRAVENOUS; SUBCUTANEOUS
Status: DISCONTINUED | OUTPATIENT
Start: 2020-01-01 | End: 2020-01-01 | Stop reason: ALTCHOICE

## 2020-01-01 RX ORDER — HEPARIN SODIUM 200 [USP'U]/100ML
INJECTION, SOLUTION INTRAVENOUS
Status: COMPLETED | OUTPATIENT
Start: 2020-01-01 | End: 2020-01-01

## 2020-01-01 RX ORDER — WARFARIN 1 MG/1
TABLET ORAL
COMMUNITY
End: 2020-01-01 | Stop reason: SDUPTHER

## 2020-01-01 RX ORDER — BACITRACIN 500 [USP'U]/G
OINTMENT TOPICAL 3 TIMES DAILY
Status: DISCONTINUED | OUTPATIENT
Start: 2020-01-01 | End: 2020-01-01 | Stop reason: HOSPADM

## 2020-01-01 RX ORDER — HEPARIN SODIUM 5000 [USP'U]/ML
2000 INJECTION, SOLUTION INTRAVENOUS; SUBCUTANEOUS AS NEEDED
Status: DISCONTINUED | OUTPATIENT
Start: 2020-01-01 | End: 2020-01-01 | Stop reason: ALTCHOICE

## 2020-01-01 RX ORDER — HEPARIN SODIUM 1000 [USP'U]/ML
INJECTION, SOLUTION INTRAVENOUS; SUBCUTANEOUS AS NEEDED
Status: DISCONTINUED | OUTPATIENT
Start: 2020-01-01 | End: 2020-01-01 | Stop reason: HOSPADM

## 2020-01-01 RX ORDER — ACETAMINOPHEN 325 MG/1
650 TABLET ORAL ONCE
Status: COMPLETED | OUTPATIENT
Start: 2020-01-01 | End: 2020-01-01

## 2020-01-01 RX ORDER — HYDROCODONE BITARTRATE AND ACETAMINOPHEN 5; 325 MG/1; MG/1
1 TABLET ORAL
Qty: 12 TAB | Refills: 0 | Status: SHIPPED | OUTPATIENT
Start: 2020-01-01 | End: 2020-01-01

## 2020-01-01 RX ORDER — DEXTROMETHORPHAN/PSEUDOEPHED 2.5-7.5/.8
1.2 DROPS ORAL
Status: DISCONTINUED | OUTPATIENT
Start: 2020-01-01 | End: 2020-01-01 | Stop reason: HOSPADM

## 2020-01-01 RX ORDER — SODIUM CHLORIDE 0.9 % (FLUSH) 0.9 %
5-40 SYRINGE (ML) INJECTION AS NEEDED
Status: CANCELLED | OUTPATIENT
Start: 2020-01-01

## 2020-01-01 RX ORDER — BISACODYL 5 MG
10 TABLET, DELAYED RELEASE (ENTERIC COATED) ORAL DAILY
Status: DISCONTINUED | OUTPATIENT
Start: 2020-01-01 | End: 2020-01-01 | Stop reason: HOSPADM

## 2020-01-01 RX ORDER — DEXTROSE 50 % IN WATER (D50W) INTRAVENOUS SYRINGE
12.5-25 AS NEEDED
Status: DISCONTINUED | OUTPATIENT
Start: 2020-01-01 | End: 2020-01-01 | Stop reason: HOSPADM

## 2020-01-01 RX ORDER — WARFARIN 1 MG/1
TABLET ORAL
COMMUNITY
Start: 2020-01-01 | End: 2020-01-01 | Stop reason: DRUGHIGH

## 2020-01-01 RX ORDER — OXYCODONE AND ACETAMINOPHEN 5; 325 MG/1; MG/1
1 TABLET ORAL AS NEEDED
Status: DISCONTINUED | OUTPATIENT
Start: 2020-01-01 | End: 2020-01-01 | Stop reason: HOSPADM

## 2020-01-01 RX ORDER — FENTANYL CITRATE 50 UG/ML
50 INJECTION, SOLUTION INTRAMUSCULAR; INTRAVENOUS
Status: COMPLETED | OUTPATIENT
Start: 2020-01-01 | End: 2020-01-01

## 2020-01-01 RX ORDER — BUMETANIDE 1 MG/1
1 TABLET ORAL 2 TIMES DAILY
Status: DISCONTINUED | OUTPATIENT
Start: 2020-01-01 | End: 2020-01-01 | Stop reason: HOSPADM

## 2020-01-01 RX ORDER — BUMETANIDE 0.25 MG/ML
1 INJECTION INTRAMUSCULAR; INTRAVENOUS DAILY
Status: DISCONTINUED | OUTPATIENT
Start: 2020-01-01 | End: 2020-01-01

## 2020-01-01 RX ORDER — FENTANYL CITRATE 50 UG/ML
25 INJECTION, SOLUTION INTRAMUSCULAR; INTRAVENOUS
Status: DISCONTINUED | OUTPATIENT
Start: 2020-01-01 | End: 2020-01-01

## 2020-01-01 RX ORDER — VANCOMYCIN/0.9 % SOD CHLORIDE 1.5G/250ML
1500 PLASTIC BAG, INJECTION (ML) INTRAVENOUS EVERY 12 HOURS
Status: COMPLETED | OUTPATIENT
Start: 2020-01-01 | End: 2020-01-01

## 2020-01-01 RX ORDER — HYDROMORPHONE HYDROCHLORIDE 2 MG/1
2 TABLET ORAL
Status: DISCONTINUED | OUTPATIENT
Start: 2020-01-01 | End: 2020-01-01

## 2020-01-01 RX ORDER — HEPARIN SODIUM 5000 [USP'U]/ML
2000 INJECTION, SOLUTION INTRAVENOUS; SUBCUTANEOUS
Status: DISCONTINUED | OUTPATIENT
Start: 2020-01-01 | End: 2020-01-01 | Stop reason: ALTCHOICE

## 2020-01-01 RX ORDER — PROMETHAZINE HYDROCHLORIDE 25 MG/1
25 TABLET ORAL
Status: DISCONTINUED | OUTPATIENT
Start: 2020-01-01 | End: 2020-01-01

## 2020-01-01 RX ORDER — SERTRALINE HYDROCHLORIDE 50 MG/1
75 TABLET, FILM COATED ORAL DAILY
COMMUNITY
End: 2020-01-01

## 2020-01-01 RX ORDER — HYDROCORTISONE SODIUM SUCCINATE 100 MG/2ML
INJECTION, POWDER, FOR SOLUTION INTRAMUSCULAR; INTRAVENOUS AS NEEDED
Status: DISCONTINUED | OUTPATIENT
Start: 2020-01-01 | End: 2020-01-01 | Stop reason: HOSPADM

## 2020-01-01 RX ORDER — NALOXONE HYDROCHLORIDE 0.4 MG/ML
0.4 INJECTION, SOLUTION INTRAMUSCULAR; INTRAVENOUS; SUBCUTANEOUS AS NEEDED
Status: DISCONTINUED | OUTPATIENT
Start: 2020-01-01 | End: 2020-01-01 | Stop reason: HOSPADM

## 2020-01-01 RX ORDER — WARFARIN 2 MG/1
2 TABLET ORAL
Status: DISCONTINUED | OUTPATIENT
Start: 2020-01-01 | End: 2020-01-01

## 2020-01-01 RX ADMIN — MIDAZOLAM 2 MG: 1 INJECTION INTRAMUSCULAR; INTRAVENOUS at 11:55

## 2020-01-01 RX ADMIN — DILTIAZEM HYDROCHLORIDE 180 MG: 180 CAPSULE, COATED, EXTENDED RELEASE ORAL at 09:03

## 2020-01-01 RX ADMIN — HYDRALAZINE HYDROCHLORIDE 100 MG: 50 TABLET, FILM COATED ORAL at 15:34

## 2020-01-01 RX ADMIN — HEPARIN SODIUM 4000 UNITS: 5000 INJECTION INTRAVENOUS; SUBCUTANEOUS at 02:29

## 2020-01-01 RX ADMIN — HEPARIN SODIUM 5000 UNITS: 1000 INJECTION INTRAVENOUS; SUBCUTANEOUS at 14:53

## 2020-01-01 RX ADMIN — METOPROLOL TARTRATE 25 MG: 25 TABLET, FILM COATED ORAL at 18:01

## 2020-01-01 RX ADMIN — WARFARIN SODIUM 2.5 MG: 2.5 TABLET ORAL at 22:23

## 2020-01-01 RX ADMIN — OXYCODONE HYDROCHLORIDE AND ACETAMINOPHEN 1 TABLET: 5; 325 TABLET ORAL at 20:57

## 2020-01-01 RX ADMIN — BACITRACIN: 500 OINTMENT TOPICAL at 08:19

## 2020-01-01 RX ADMIN — Medication 10 ML: at 03:51

## 2020-01-01 RX ADMIN — HYDROMORPHONE HYDROCHLORIDE 2 MG: 2 TABLET ORAL at 05:29

## 2020-01-01 RX ADMIN — HYDRALAZINE HYDROCHLORIDE 100 MG: 50 TABLET, FILM COATED ORAL at 08:43

## 2020-01-01 RX ADMIN — WARFARIN SODIUM 2 MG: 2 TABLET ORAL at 12:48

## 2020-01-01 RX ADMIN — HUMAN INSULIN 2 UNITS: 100 INJECTION, SOLUTION SUBCUTANEOUS at 11:27

## 2020-01-01 RX ADMIN — ONDANSETRON 4 MG: 2 INJECTION INTRAMUSCULAR; INTRAVENOUS at 09:53

## 2020-01-01 RX ADMIN — HYDRALAZINE HYDROCHLORIDE 100 MG: 50 TABLET, FILM COATED ORAL at 16:45

## 2020-01-01 RX ADMIN — PROPOFOL 30 MG: 10 INJECTION, EMULSION INTRAVENOUS at 15:40

## 2020-01-01 RX ADMIN — MEROPENEM 500 MG: 500 INJECTION, POWDER, FOR SOLUTION INTRAVENOUS at 23:44

## 2020-01-01 RX ADMIN — BUSPIRONE HYDROCHLORIDE 10 MG: 10 TABLET ORAL at 17:05

## 2020-01-01 RX ADMIN — HYDRALAZINE HYDROCHLORIDE 100 MG: 50 TABLET, FILM COATED ORAL at 09:07

## 2020-01-01 RX ADMIN — BUSPIRONE HYDROCHLORIDE 10 MG: 10 TABLET ORAL at 11:48

## 2020-01-01 RX ADMIN — SERTRALINE HYDROCHLORIDE 125 MG: 50 TABLET ORAL at 10:52

## 2020-01-01 RX ADMIN — PHYTONADIONE 10 MG: 10 INJECTION, EMULSION INTRAMUSCULAR; INTRAVENOUS; SUBCUTANEOUS at 11:27

## 2020-01-01 RX ADMIN — SODIUM CHLORIDE 25 ML/HR: 900 INJECTION, SOLUTION INTRAVENOUS at 20:11

## 2020-01-01 RX ADMIN — BUSPIRONE HYDROCHLORIDE 10 MG: 10 TABLET ORAL at 18:35

## 2020-01-01 RX ADMIN — Medication 10 ML: at 10:27

## 2020-01-01 RX ADMIN — GABAPENTIN 100 MG: 100 CAPSULE ORAL at 08:14

## 2020-01-01 RX ADMIN — PANTOPRAZOLE SODIUM 40 MG: 40 TABLET, DELAYED RELEASE ORAL at 09:04

## 2020-01-01 RX ADMIN — MIDAZOLAM HYDROCHLORIDE 0.5 MG: 1 INJECTION, SOLUTION INTRAMUSCULAR; INTRAVENOUS at 17:05

## 2020-01-01 RX ADMIN — HYDROMORPHONE HYDROCHLORIDE 0.5 MG: 1 INJECTION, SOLUTION INTRAMUSCULAR; INTRAVENOUS; SUBCUTANEOUS at 05:57

## 2020-01-01 RX ADMIN — SODIUM CHLORIDE 25 ML/HR: 900 INJECTION, SOLUTION INTRAVENOUS at 15:45

## 2020-01-01 RX ADMIN — Medication 10 ML: at 16:22

## 2020-01-01 RX ADMIN — INSULIN GLARGINE 10 UNITS: 100 INJECTION, SOLUTION SUBCUTANEOUS at 09:02

## 2020-01-01 RX ADMIN — NYSTATIN: 100000 CREAM TOPICAL at 19:45

## 2020-01-01 RX ADMIN — HYDRALAZINE HYDROCHLORIDE 100 MG: 50 TABLET, FILM COATED ORAL at 15:44

## 2020-01-01 RX ADMIN — OXYCODONE HYDROCHLORIDE AND ACETAMINOPHEN 1 TABLET: 5; 325 TABLET ORAL at 08:05

## 2020-01-01 RX ADMIN — OXYCODONE HYDROCHLORIDE AND ACETAMINOPHEN 2 TABLET: 5; 325 TABLET ORAL at 00:00

## 2020-01-01 RX ADMIN — Medication 1 AMPULE: at 20:57

## 2020-01-01 RX ADMIN — HYDROMORPHONE HYDROCHLORIDE 0.5 MG: 1 INJECTION, SOLUTION INTRAMUSCULAR; INTRAVENOUS; SUBCUTANEOUS at 02:17

## 2020-01-01 RX ADMIN — HYDRALAZINE HYDROCHLORIDE 100 MG: 50 TABLET, FILM COATED ORAL at 16:13

## 2020-01-01 RX ADMIN — BUSPIRONE HYDROCHLORIDE 10 MG: 10 TABLET ORAL at 08:44

## 2020-01-01 RX ADMIN — BUSPIRONE HYDROCHLORIDE 10 MG: 10 TABLET ORAL at 17:41

## 2020-01-01 RX ADMIN — Medication 10 ML: at 15:32

## 2020-01-01 RX ADMIN — METOPROLOL SUCCINATE 50 MG: 50 TABLET, EXTENDED RELEASE ORAL at 08:41

## 2020-01-01 RX ADMIN — FENTANYL CITRATE 25 MCG: 50 INJECTION, SOLUTION INTRAMUSCULAR; INTRAVENOUS at 13:21

## 2020-01-01 RX ADMIN — ACETAMINOPHEN 650 MG: 325 TABLET, FILM COATED ORAL at 21:07

## 2020-01-01 RX ADMIN — HYDROMORPHONE HYDROCHLORIDE 0.5 MG: 1 INJECTION, SOLUTION INTRAMUSCULAR; INTRAVENOUS; SUBCUTANEOUS at 07:06

## 2020-01-01 RX ADMIN — FENTANYL CITRATE 50 MCG: 50 INJECTION, SOLUTION INTRAMUSCULAR; INTRAVENOUS at 13:09

## 2020-01-01 RX ADMIN — HYDROMORPHONE HYDROCHLORIDE 1 MG: 2 INJECTION, SOLUTION INTRAMUSCULAR; INTRAVENOUS; SUBCUTANEOUS at 08:40

## 2020-01-01 RX ADMIN — Medication 10 ML: at 22:21

## 2020-01-01 RX ADMIN — BUMETANIDE 0.5 MG: 1 TABLET ORAL at 08:58

## 2020-01-01 RX ADMIN — METRONIDAZOLE 500 MG: 500 INJECTION, SOLUTION INTRAVENOUS at 05:53

## 2020-01-01 RX ADMIN — GABAPENTIN 100 MG: 100 CAPSULE ORAL at 02:37

## 2020-01-01 RX ADMIN — HYDRALAZINE HYDROCHLORIDE 100 MG: 50 TABLET, FILM COATED ORAL at 21:39

## 2020-01-01 RX ADMIN — HEPARIN SODIUM 2000 UNITS: 5000 INJECTION INTRAVENOUS; SUBCUTANEOUS at 11:12

## 2020-01-01 RX ADMIN — HYDROMORPHONE HYDROCHLORIDE 0.5 MG: 1 INJECTION, SOLUTION INTRAMUSCULAR; INTRAVENOUS; SUBCUTANEOUS at 18:00

## 2020-01-01 RX ADMIN — Medication 1 AMPULE: at 21:18

## 2020-01-01 RX ADMIN — METOPROLOL SUCCINATE 200 MG: 50 TABLET, EXTENDED RELEASE ORAL at 08:06

## 2020-01-01 RX ADMIN — WARFARIN SODIUM 2.5 MG: 2.5 TABLET ORAL at 16:19

## 2020-01-01 RX ADMIN — HUMAN INSULIN 3 UNITS: 100 INJECTION, SOLUTION SUBCUTANEOUS at 22:25

## 2020-01-01 RX ADMIN — SERTRALINE HYDROCHLORIDE 50 MG: 50 TABLET ORAL at 10:54

## 2020-01-01 RX ADMIN — Medication 10 ML: at 08:55

## 2020-01-01 RX ADMIN — Medication 80 MCG: at 11:15

## 2020-01-01 RX ADMIN — HYDRALAZINE HYDROCHLORIDE 50 MG: 50 TABLET, FILM COATED ORAL at 21:23

## 2020-01-01 RX ADMIN — INSULIN GLARGINE 10 UNITS: 100 INJECTION, SOLUTION SUBCUTANEOUS at 08:36

## 2020-01-01 RX ADMIN — HYDROMORPHONE HYDROCHLORIDE 0.5 MG: 1 INJECTION, SOLUTION INTRAMUSCULAR; INTRAVENOUS; SUBCUTANEOUS at 09:40

## 2020-01-01 RX ADMIN — ONDANSETRON HYDROCHLORIDE 4 MG: 2 INJECTION, SOLUTION INTRAMUSCULAR; INTRAVENOUS at 08:15

## 2020-01-01 RX ADMIN — Medication 1 AMPULE: at 08:45

## 2020-01-01 RX ADMIN — COLCHICINE 0.6 MG: 0.6 TABLET, FILM COATED ORAL at 08:47

## 2020-01-01 RX ADMIN — PROPOFOL 30 MG: 10 INJECTION, EMULSION INTRAVENOUS at 11:54

## 2020-01-01 RX ADMIN — BACITRACIN: 500 OINTMENT TOPICAL at 18:29

## 2020-01-01 RX ADMIN — DILTIAZEM HYDROCHLORIDE 180 MG: 180 CAPSULE, COATED, EXTENDED RELEASE ORAL at 10:54

## 2020-01-01 RX ADMIN — Medication 10 ML: at 05:53

## 2020-01-01 RX ADMIN — VANCOMYCIN HYDROCHLORIDE 1250 MG: 10 INJECTION, POWDER, LYOPHILIZED, FOR SOLUTION INTRAVENOUS at 09:58

## 2020-01-01 RX ADMIN — BUSPIRONE HYDROCHLORIDE 10 MG: 10 TABLET ORAL at 17:22

## 2020-01-01 RX ADMIN — HYDROMORPHONE HYDROCHLORIDE 0.5 MG: 2 INJECTION, SOLUTION INTRAMUSCULAR; INTRAVENOUS; SUBCUTANEOUS at 13:59

## 2020-01-01 RX ADMIN — ACETAMINOPHEN 650 MG: 325 TABLET ORAL at 12:31

## 2020-01-01 RX ADMIN — Medication 10 ML: at 22:09

## 2020-01-01 RX ADMIN — HYDROMORPHONE HYDROCHLORIDE 1 MG: 1 INJECTION, SOLUTION INTRAMUSCULAR; INTRAVENOUS; SUBCUTANEOUS at 01:10

## 2020-01-01 RX ADMIN — ONDANSETRON 4 MG: 2 INJECTION INTRAMUSCULAR; INTRAVENOUS at 00:45

## 2020-01-01 RX ADMIN — WARFARIN SODIUM 1 MG: 1 TABLET ORAL at 17:07

## 2020-01-01 RX ADMIN — SERTRALINE HYDROCHLORIDE 75 MG: 50 TABLET ORAL at 15:53

## 2020-01-01 RX ADMIN — BUMETANIDE 0.5 MG: 1 TABLET ORAL at 16:18

## 2020-01-01 RX ADMIN — HYDROMORPHONE HYDROCHLORIDE 0.5 MG: 1 INJECTION, SOLUTION INTRAMUSCULAR; INTRAVENOUS; SUBCUTANEOUS at 21:39

## 2020-01-01 RX ADMIN — OXYCODONE HYDROCHLORIDE AND ACETAMINOPHEN 1 TABLET: 5; 325 TABLET ORAL at 20:16

## 2020-01-01 RX ADMIN — BUSPIRONE HYDROCHLORIDE 10 MG: 10 TABLET ORAL at 17:14

## 2020-01-01 RX ADMIN — Medication 10 ML: at 20:27

## 2020-01-01 RX ADMIN — FENTANYL CITRATE 25 MCG: 50 INJECTION INTRAMUSCULAR; INTRAVENOUS at 20:10

## 2020-01-01 RX ADMIN — HYDROMORPHONE HYDROCHLORIDE 3 MG: 2 TABLET ORAL at 16:18

## 2020-01-01 RX ADMIN — Medication 10 ML: at 17:27

## 2020-01-01 RX ADMIN — HYDROMORPHONE HYDROCHLORIDE 0.5 MG: 1 INJECTION, SOLUTION INTRAMUSCULAR; INTRAVENOUS; SUBCUTANEOUS at 13:44

## 2020-01-01 RX ADMIN — HYDRALAZINE HYDROCHLORIDE 100 MG: 50 TABLET, FILM COATED ORAL at 09:10

## 2020-01-01 RX ADMIN — Medication 10 ML: at 22:27

## 2020-01-01 RX ADMIN — HYDRALAZINE HYDROCHLORIDE 100 MG: 50 TABLET, FILM COATED ORAL at 21:06

## 2020-01-01 RX ADMIN — NYSTATIN: 100000 CREAM TOPICAL at 08:56

## 2020-01-01 RX ADMIN — Medication 10 ML: at 22:00

## 2020-01-01 RX ADMIN — Medication 10 ML: at 07:07

## 2020-01-01 RX ADMIN — BUMETANIDE 1 MG: 1 TABLET ORAL at 12:04

## 2020-01-01 RX ADMIN — HYDRALAZINE HYDROCHLORIDE 50 MG: 50 TABLET, FILM COATED ORAL at 17:39

## 2020-01-01 RX ADMIN — MIDAZOLAM 1 MG: 1 INJECTION INTRAMUSCULAR; INTRAVENOUS at 12:46

## 2020-01-01 RX ADMIN — CARVEDILOL 12.5 MG: 12.5 TABLET, FILM COATED ORAL at 08:25

## 2020-01-01 RX ADMIN — Medication 10 ML: at 06:55

## 2020-01-01 RX ADMIN — BUSPIRONE HYDROCHLORIDE 10 MG: 10 TABLET ORAL at 15:29

## 2020-01-01 RX ADMIN — Medication 120 MCG: at 12:41

## 2020-01-01 RX ADMIN — SODIUM CHLORIDE 20 ML/HR: 900 INJECTION, SOLUTION INTRAVENOUS at 03:38

## 2020-01-01 RX ADMIN — GABAPENTIN 100 MG: 100 CAPSULE ORAL at 09:08

## 2020-01-01 RX ADMIN — HYDROMORPHONE HYDROCHLORIDE 0.5 MG: 1 INJECTION, SOLUTION INTRAMUSCULAR; INTRAVENOUS; SUBCUTANEOUS at 17:59

## 2020-01-01 RX ADMIN — METOPROLOL TARTRATE 25 MG: 25 TABLET, FILM COATED ORAL at 08:58

## 2020-01-01 RX ADMIN — BUMETANIDE 2 MG: 1 TABLET ORAL at 08:13

## 2020-01-01 RX ADMIN — Medication 10 ML: at 17:41

## 2020-01-01 RX ADMIN — ACETAMINOPHEN 650 MG: 325 TABLET ORAL at 14:35

## 2020-01-01 RX ADMIN — BUSPIRONE HYDROCHLORIDE 10 MG: 10 TABLET ORAL at 08:35

## 2020-01-01 RX ADMIN — Medication 10 ML: at 22:20

## 2020-01-01 RX ADMIN — BUSPIRONE HYDROCHLORIDE 10 MG: 10 TABLET ORAL at 09:38

## 2020-01-01 RX ADMIN — BUMETANIDE 2 MG: 1 TABLET ORAL at 09:07

## 2020-01-01 RX ADMIN — HYDROMORPHONE HYDROCHLORIDE 2 MG: 2 TABLET ORAL at 17:46

## 2020-01-01 RX ADMIN — FENTANYL CITRATE 50 MCG: 50 INJECTION INTRAMUSCULAR; INTRAVENOUS at 15:33

## 2020-01-01 RX ADMIN — BUSPIRONE HYDROCHLORIDE 10 MG: 10 TABLET ORAL at 08:53

## 2020-01-01 RX ADMIN — SCOPALAMINE 1 PATCH: 1 PATCH, EXTENDED RELEASE TRANSDERMAL at 08:00

## 2020-01-01 RX ADMIN — BUSPIRONE HYDROCHLORIDE 10 MG: 10 TABLET ORAL at 09:10

## 2020-01-01 RX ADMIN — ASPIRIN 81 MG CHEWABLE TABLET 324 MG: 81 TABLET CHEWABLE at 19:31

## 2020-01-01 RX ADMIN — FENTANYL CITRATE 25 MCG: 50 INJECTION, SOLUTION INTRAMUSCULAR; INTRAVENOUS at 13:58

## 2020-01-01 RX ADMIN — PANTOPRAZOLE SODIUM 40 MG: 40 TABLET, DELAYED RELEASE ORAL at 08:22

## 2020-01-01 RX ADMIN — Medication 10 ML: at 17:20

## 2020-01-01 RX ADMIN — DILTIAZEM HYDROCHLORIDE 180 MG: 180 CAPSULE, COATED, EXTENDED RELEASE ORAL at 09:14

## 2020-01-01 RX ADMIN — BACITRACIN: 500 OINTMENT TOPICAL at 08:58

## 2020-01-01 RX ADMIN — HYDROMORPHONE HYDROCHLORIDE 1 MG: 1 INJECTION, SOLUTION INTRAMUSCULAR; INTRAVENOUS; SUBCUTANEOUS at 23:28

## 2020-01-01 RX ADMIN — SERTRALINE HYDROCHLORIDE 50 MG: 50 TABLET ORAL at 09:10

## 2020-01-01 RX ADMIN — HYDROMORPHONE HYDROCHLORIDE 0.5 MG: 1 INJECTION, SOLUTION INTRAMUSCULAR; INTRAVENOUS; SUBCUTANEOUS at 10:26

## 2020-01-01 RX ADMIN — METOPROLOL SUCCINATE 50 MG: 50 TABLET, EXTENDED RELEASE ORAL at 09:10

## 2020-01-01 RX ADMIN — MEROPENEM 500 MG: 500 INJECTION, POWDER, FOR SOLUTION INTRAVENOUS at 14:50

## 2020-01-01 RX ADMIN — BUSPIRONE HYDROCHLORIDE 10 MG: 10 TABLET ORAL at 08:25

## 2020-01-01 RX ADMIN — SODIUM CHLORIDE 10 MG/HR: 900 INJECTION, SOLUTION INTRAVENOUS at 04:17

## 2020-01-01 RX ADMIN — OXYCODONE HYDROCHLORIDE AND ACETAMINOPHEN 2 TABLET: 5; 325 TABLET ORAL at 14:09

## 2020-01-01 RX ADMIN — SERTRALINE HYDROCHLORIDE 50 MG: 50 TABLET ORAL at 08:54

## 2020-01-01 RX ADMIN — AZITHROMYCIN MONOHYDRATE 500 MG: 500 INJECTION, POWDER, LYOPHILIZED, FOR SOLUTION INTRAVENOUS at 02:33

## 2020-01-01 RX ADMIN — DOXAZOSIN 4 MG: 2 TABLET ORAL at 21:07

## 2020-01-01 RX ADMIN — HYDROMORPHONE HYDROCHLORIDE 1 MG: 2 TABLET ORAL at 12:04

## 2020-01-01 RX ADMIN — WARFARIN SODIUM 2 MG: 2 TABLET ORAL at 17:14

## 2020-01-01 RX ADMIN — MEROPENEM 500 MG: 500 INJECTION, POWDER, FOR SOLUTION INTRAVENOUS at 22:38

## 2020-01-01 RX ADMIN — HEPARIN SODIUM 13 UNITS/KG/HR: 10000 INJECTION, SOLUTION INTRAVENOUS at 03:02

## 2020-01-01 RX ADMIN — SODIUM CHLORIDE: 900 INJECTION, SOLUTION INTRAVENOUS at 11:26

## 2020-01-01 RX ADMIN — HUMAN INSULIN 2 UNITS: 100 INJECTION, SOLUTION SUBCUTANEOUS at 12:10

## 2020-01-01 RX ADMIN — ONDANSETRON 4 MG: 2 INJECTION INTRAMUSCULAR; INTRAVENOUS at 00:41

## 2020-01-01 RX ADMIN — BUSPIRONE HYDROCHLORIDE 10 MG: 10 TABLET ORAL at 09:21

## 2020-01-01 RX ADMIN — INSULIN GLARGINE 10 UNITS: 100 INJECTION, SOLUTION SUBCUTANEOUS at 00:03

## 2020-01-01 RX ADMIN — OXYCODONE HYDROCHLORIDE AND ACETAMINOPHEN 1 TABLET: 5; 325 TABLET ORAL at 22:08

## 2020-01-01 RX ADMIN — POTASSIUM CHLORIDE 20 MEQ: 750 TABLET, FILM COATED, EXTENDED RELEASE ORAL at 08:57

## 2020-01-01 RX ADMIN — HYDROMORPHONE HYDROCHLORIDE 0.5 MG: 1 INJECTION, SOLUTION INTRAMUSCULAR; INTRAVENOUS; SUBCUTANEOUS at 09:13

## 2020-01-01 RX ADMIN — VANCOMYCIN HYDROCHLORIDE 1250 MG: 10 INJECTION, POWDER, LYOPHILIZED, FOR SOLUTION INTRAVENOUS at 00:32

## 2020-01-01 RX ADMIN — METOPROLOL TARTRATE 25 MG: 25 TABLET, FILM COATED ORAL at 17:33

## 2020-01-01 RX ADMIN — Medication 10 ML: at 22:06

## 2020-01-01 RX ADMIN — Medication 120 MCG: at 12:35

## 2020-01-01 RX ADMIN — COLCHICINE 0.6 MG: 0.6 TABLET, FILM COATED ORAL at 08:50

## 2020-01-01 RX ADMIN — GABAPENTIN 100 MG: 100 CAPSULE ORAL at 16:43

## 2020-01-01 RX ADMIN — OXYCODONE HYDROCHLORIDE AND ACETAMINOPHEN 1 TABLET: 5; 325 TABLET ORAL at 05:45

## 2020-01-01 RX ADMIN — HUMAN INSULIN 2 UNITS: 100 INJECTION, SOLUTION SUBCUTANEOUS at 12:56

## 2020-01-01 RX ADMIN — NYSTATIN: 100000 CREAM TOPICAL at 08:18

## 2020-01-01 RX ADMIN — HEPARIN SODIUM 9 UNITS/KG/HR: 10000 INJECTION, SOLUTION INTRAVENOUS at 20:22

## 2020-01-01 RX ADMIN — WARFARIN SODIUM 2 MG: 2 TABLET ORAL at 17:10

## 2020-01-01 RX ADMIN — HYDROMORPHONE HYDROCHLORIDE 2 MG: 2 TABLET ORAL at 19:41

## 2020-01-01 RX ADMIN — MIDAZOLAM 2 MG: 1 INJECTION INTRAMUSCULAR; INTRAVENOUS at 12:45

## 2020-01-01 RX ADMIN — HYDRALAZINE HYDROCHLORIDE 100 MG: 50 TABLET, FILM COATED ORAL at 09:21

## 2020-01-01 RX ADMIN — BUSPIRONE HYDROCHLORIDE 10 MG: 10 TABLET ORAL at 10:53

## 2020-01-01 RX ADMIN — Medication 10 ML: at 22:47

## 2020-01-01 RX ADMIN — POTASSIUM CHLORIDE 40 MEQ: 750 TABLET, FILM COATED, EXTENDED RELEASE ORAL at 12:04

## 2020-01-01 RX ADMIN — BUMETANIDE 1 MG: 1 TABLET ORAL at 10:12

## 2020-01-01 RX ADMIN — INSULIN GLARGINE 10 UNITS: 100 INJECTION, SOLUTION SUBCUTANEOUS at 22:00

## 2020-01-01 RX ADMIN — HYDROMORPHONE HYDROCHLORIDE 0.5 MG: 2 INJECTION, SOLUTION INTRAMUSCULAR; INTRAVENOUS; SUBCUTANEOUS at 13:23

## 2020-01-01 RX ADMIN — DOXAZOSIN 4 MG: 2 TABLET ORAL at 22:24

## 2020-01-01 RX ADMIN — BUSPIRONE HYDROCHLORIDE 10 MG: 10 TABLET ORAL at 09:23

## 2020-01-01 RX ADMIN — Medication 10 ML: at 14:40

## 2020-01-01 RX ADMIN — LIDOCAINE HYDROCHLORIDE 40 MG: 20 INJECTION, SOLUTION EPIDURAL; INFILTRATION; INTRACAUDAL; PERINEURAL at 11:52

## 2020-01-01 RX ADMIN — HYDRALAZINE HYDROCHLORIDE 25 MG: 25 TABLET, FILM COATED ORAL at 09:09

## 2020-01-01 RX ADMIN — VANCOMYCIN HYDROCHLORIDE 1250 MG: 10 INJECTION, POWDER, LYOPHILIZED, FOR SOLUTION INTRAVENOUS at 03:28

## 2020-01-01 RX ADMIN — BENZOCAINE, BUTAMBEN, AND TETRACAINE HYDROCHLORIDE 3 SPRAY: .028; .004; .004 AEROSOL, SPRAY TOPICAL at 12:42

## 2020-01-01 RX ADMIN — Medication 1 AMPULE: at 12:14

## 2020-01-01 RX ADMIN — WARFARIN SODIUM 3 MG: 2 TABLET ORAL at 16:26

## 2020-01-01 RX ADMIN — HYDRALAZINE HYDROCHLORIDE 100 MG: 50 TABLET, FILM COATED ORAL at 16:58

## 2020-01-01 RX ADMIN — VANCOMYCIN HYDROCHLORIDE 1250 MG: 10 INJECTION, POWDER, LYOPHILIZED, FOR SOLUTION INTRAVENOUS at 15:31

## 2020-01-01 RX ADMIN — TRAMADOL HYDROCHLORIDE 50 MG: 50 TABLET, FILM COATED ORAL at 04:19

## 2020-01-01 RX ADMIN — Medication 10 ML: at 05:48

## 2020-01-01 RX ADMIN — Medication 10 ML: at 22:01

## 2020-01-01 RX ADMIN — BUSPIRONE HYDROCHLORIDE 10 MG: 10 TABLET ORAL at 18:10

## 2020-01-01 RX ADMIN — TRAMADOL HYDROCHLORIDE 50 MG: 50 TABLET, FILM COATED ORAL at 06:37

## 2020-01-01 RX ADMIN — Medication 10 ML: at 23:15

## 2020-01-01 RX ADMIN — BUSPIRONE HYDROCHLORIDE 10 MG: 10 TABLET ORAL at 08:41

## 2020-01-01 RX ADMIN — HYDROMORPHONE HYDROCHLORIDE 0.5 MG: 1 INJECTION, SOLUTION INTRAMUSCULAR; INTRAVENOUS; SUBCUTANEOUS at 11:28

## 2020-01-01 RX ADMIN — METOPROLOL TARTRATE 25 MG: 25 TABLET, FILM COATED ORAL at 18:23

## 2020-01-01 RX ADMIN — SUCCINYLCHOLINE CHLORIDE 160 MG: 20 INJECTION, SOLUTION INTRAMUSCULAR; INTRAVENOUS at 14:28

## 2020-01-01 RX ADMIN — Medication 10 ML: at 11:43

## 2020-01-01 RX ADMIN — HYDRALAZINE HYDROCHLORIDE 100 MG: 50 TABLET, FILM COATED ORAL at 22:50

## 2020-01-01 RX ADMIN — SERTRALINE HYDROCHLORIDE 75 MG: 50 TABLET ORAL at 09:54

## 2020-01-01 RX ADMIN — SUCRALFATE 1 G: 1 TABLET ORAL at 21:18

## 2020-01-01 RX ADMIN — HYDROMORPHONE HYDROCHLORIDE 0.5 MG: 1 INJECTION, SOLUTION INTRAMUSCULAR; INTRAVENOUS; SUBCUTANEOUS at 22:34

## 2020-01-01 RX ADMIN — HYDROMORPHONE HYDROCHLORIDE 0.5 MG: 1 INJECTION, SOLUTION INTRAMUSCULAR; INTRAVENOUS; SUBCUTANEOUS at 04:15

## 2020-01-01 RX ADMIN — DILTIAZEM HYDROCHLORIDE 180 MG: 180 CAPSULE, COATED, EXTENDED RELEASE ORAL at 10:44

## 2020-01-01 RX ADMIN — SERTRALINE HYDROCHLORIDE 75 MG: 50 TABLET ORAL at 11:08

## 2020-01-01 RX ADMIN — BUMETANIDE 0.5 MG: 1 TABLET ORAL at 09:38

## 2020-01-01 RX ADMIN — HYDROMORPHONE HYDROCHLORIDE 0.5 MG: 1 INJECTION, SOLUTION INTRAMUSCULAR; INTRAVENOUS; SUBCUTANEOUS at 20:09

## 2020-01-01 RX ADMIN — HYDRALAZINE HYDROCHLORIDE 100 MG: 50 TABLET, FILM COATED ORAL at 23:43

## 2020-01-01 RX ADMIN — INSULIN GLARGINE 10 UNITS: 100 INJECTION, SOLUTION SUBCUTANEOUS at 08:18

## 2020-01-01 RX ADMIN — SUCRALFATE 1 G: 1 TABLET ORAL at 06:39

## 2020-01-01 RX ADMIN — HYDRALAZINE HYDROCHLORIDE 100 MG: 50 TABLET, FILM COATED ORAL at 15:38

## 2020-01-01 RX ADMIN — SODIUM CHLORIDE 20 ML/HR: 900 INJECTION, SOLUTION INTRAVENOUS at 21:48

## 2020-01-01 RX ADMIN — Medication 10 ML: at 06:59

## 2020-01-01 RX ADMIN — FENTANYL CITRATE 50 MCG: 50 INJECTION, SOLUTION INTRAMUSCULAR; INTRAVENOUS at 11:42

## 2020-01-01 RX ADMIN — HYDROMORPHONE HYDROCHLORIDE 1 MG: 1 INJECTION, SOLUTION INTRAMUSCULAR; INTRAVENOUS; SUBCUTANEOUS at 04:01

## 2020-01-01 RX ADMIN — LEVOFLOXACIN 750 MG: 750 TABLET, FILM COATED ORAL at 19:07

## 2020-01-01 RX ADMIN — BUSPIRONE HYDROCHLORIDE 10 MG: 10 TABLET ORAL at 17:21

## 2020-01-01 RX ADMIN — DILTIAZEM HYDROCHLORIDE 180 MG: 180 CAPSULE, COATED, EXTENDED RELEASE ORAL at 08:52

## 2020-01-01 RX ADMIN — Medication 10 ML: at 06:37

## 2020-01-01 RX ADMIN — DAPTOMYCIN 900 MG: 500 INJECTION, POWDER, LYOPHILIZED, FOR SOLUTION INTRAVENOUS at 12:22

## 2020-01-01 RX ADMIN — HYDROMORPHONE HYDROCHLORIDE 3 MG: 2 TABLET ORAL at 00:02

## 2020-01-01 RX ADMIN — HYDRALAZINE HYDROCHLORIDE 100 MG: 50 TABLET, FILM COATED ORAL at 08:47

## 2020-01-01 RX ADMIN — METOPROLOL TARTRATE 5 MG: 5 INJECTION INTRAVENOUS at 14:10

## 2020-01-01 RX ADMIN — EPOETIN ALFA-EPBX 20000 UNITS: 10000 INJECTION, SOLUTION INTRAVENOUS; SUBCUTANEOUS at 20:16

## 2020-01-01 RX ADMIN — HYDRALAZINE HYDROCHLORIDE 100 MG: 50 TABLET, FILM COATED ORAL at 09:31

## 2020-01-01 RX ADMIN — ONDANSETRON 4 MG: 2 INJECTION INTRAMUSCULAR; INTRAVENOUS at 03:40

## 2020-01-01 RX ADMIN — MEROPENEM 500 MG: 500 INJECTION, POWDER, FOR SOLUTION INTRAVENOUS at 03:39

## 2020-01-01 RX ADMIN — INSULIN GLARGINE 10 UNITS: 100 INJECTION, SOLUTION SUBCUTANEOUS at 09:04

## 2020-01-01 RX ADMIN — Medication 10 ML: at 06:51

## 2020-01-01 RX ADMIN — FENTANYL CITRATE 25 MCG: 50 INJECTION INTRAMUSCULAR; INTRAVENOUS at 09:28

## 2020-01-01 RX ADMIN — Medication 80 MCG: at 15:05

## 2020-01-01 RX ADMIN — HYDRALAZINE HYDROCHLORIDE 100 MG: 50 TABLET, FILM COATED ORAL at 12:04

## 2020-01-01 RX ADMIN — Medication 10 ML: at 06:52

## 2020-01-01 RX ADMIN — HUMAN INSULIN 2 UNITS: 100 INJECTION, SOLUTION SUBCUTANEOUS at 17:10

## 2020-01-01 RX ADMIN — INSULIN GLARGINE 10 UNITS: 100 INJECTION, SOLUTION SUBCUTANEOUS at 22:24

## 2020-01-01 RX ADMIN — Medication 1 AMPULE: at 21:06

## 2020-01-01 RX ADMIN — LEVOFLOXACIN 750 MG: 5 INJECTION, SOLUTION INTRAVENOUS at 16:35

## 2020-01-01 RX ADMIN — DOXAZOSIN 4 MG: 2 TABLET ORAL at 22:41

## 2020-01-01 RX ADMIN — HYDRALAZINE HYDROCHLORIDE 100 MG: 50 TABLET, FILM COATED ORAL at 21:40

## 2020-01-01 RX ADMIN — DILTIAZEM HYDROCHLORIDE 180 MG: 180 CAPSULE, COATED, EXTENDED RELEASE ORAL at 08:14

## 2020-01-01 RX ADMIN — HYDROMORPHONE HYDROCHLORIDE 2 MG: 2 TABLET ORAL at 04:05

## 2020-01-01 RX ADMIN — HYDRALAZINE HYDROCHLORIDE 25 MG: 25 TABLET, FILM COATED ORAL at 13:02

## 2020-01-01 RX ADMIN — HEPARIN SODIUM AND DEXTROSE 9 UNITS/KG/HR: 10000; 5 INJECTION INTRAVENOUS at 02:29

## 2020-01-01 RX ADMIN — INSULIN GLARGINE 10 UNITS: 100 INJECTION, SOLUTION SUBCUTANEOUS at 22:34

## 2020-01-01 RX ADMIN — HYDROMORPHONE HYDROCHLORIDE 0.5 MG: 1 INJECTION, SOLUTION INTRAMUSCULAR; INTRAVENOUS; SUBCUTANEOUS at 01:48

## 2020-01-01 RX ADMIN — HYDRALAZINE HYDROCHLORIDE 50 MG: 50 TABLET, FILM COATED ORAL at 08:54

## 2020-01-01 RX ADMIN — Medication 10 ML: at 03:55

## 2020-01-01 RX ADMIN — Medication 10 ML: at 07:12

## 2020-01-01 RX ADMIN — ONDANSETRON 4 MG: 2 INJECTION INTRAMUSCULAR; INTRAVENOUS at 18:59

## 2020-01-01 RX ADMIN — PANTOPRAZOLE SODIUM 40 MG: 40 TABLET, DELAYED RELEASE ORAL at 06:33

## 2020-01-01 RX ADMIN — COLCHICINE 0.6 MG: 0.6 TABLET, FILM COATED ORAL at 09:07

## 2020-01-01 RX ADMIN — PHYTONADIONE 5 MG: 10 INJECTION, EMULSION INTRAMUSCULAR; INTRAVENOUS; SUBCUTANEOUS at 09:14

## 2020-01-01 RX ADMIN — FENTANYL CITRATE 50 MCG: 50 INJECTION INTRAMUSCULAR; INTRAVENOUS at 15:55

## 2020-01-01 RX ADMIN — DILTIAZEM HYDROCHLORIDE 180 MG: 180 CAPSULE, COATED, EXTENDED RELEASE ORAL at 09:32

## 2020-01-01 RX ADMIN — NYSTATIN: 100000 CREAM TOPICAL at 08:31

## 2020-01-01 RX ADMIN — SUCRALFATE 1 G: 1 TABLET ORAL at 12:04

## 2020-01-01 RX ADMIN — BUMETANIDE 2 MG: 1 TABLET ORAL at 15:28

## 2020-01-01 RX ADMIN — Medication 1 AMPULE: at 09:46

## 2020-01-01 RX ADMIN — HYDRALAZINE HYDROCHLORIDE 20 MG: 20 INJECTION INTRAMUSCULAR; INTRAVENOUS at 11:48

## 2020-01-01 RX ADMIN — Medication 10 ML: at 06:00

## 2020-01-01 RX ADMIN — HYDRALAZINE HYDROCHLORIDE 100 MG: 50 TABLET, FILM COATED ORAL at 10:52

## 2020-01-01 RX ADMIN — SERTRALINE HYDROCHLORIDE 75 MG: 50 TABLET ORAL at 08:07

## 2020-01-01 RX ADMIN — BISACODYL 10 MG: 5 TABLET, COATED ORAL at 08:45

## 2020-01-01 RX ADMIN — HYDROMORPHONE HYDROCHLORIDE 2 MG: 2 TABLET ORAL at 23:53

## 2020-01-01 RX ADMIN — SODIUM CHLORIDE 12.5 MG/HR: 900 INJECTION, SOLUTION INTRAVENOUS at 23:05

## 2020-01-01 RX ADMIN — METOPROLOL TARTRATE 25 MG: 25 TABLET, FILM COATED ORAL at 12:04

## 2020-01-01 RX ADMIN — SERTRALINE HYDROCHLORIDE 75 MG: 50 TABLET ORAL at 08:45

## 2020-01-01 RX ADMIN — SUCRALFATE 1 G: 1 TABLET ORAL at 13:18

## 2020-01-01 RX ADMIN — HEPARIN SODIUM 4000 UNITS: 5000 INJECTION INTRAVENOUS; SUBCUTANEOUS at 03:04

## 2020-01-01 RX ADMIN — SUCCINYLCHOLINE CHLORIDE 160 MG: 20 INJECTION, SOLUTION INTRAMUSCULAR; INTRAVENOUS at 07:39

## 2020-01-01 RX ADMIN — HUMAN INSULIN 2 UNITS: 100 INJECTION, SOLUTION SUBCUTANEOUS at 12:37

## 2020-01-01 RX ADMIN — POTASSIUM CHLORIDE 40 MEQ: 750 TABLET, FILM COATED, EXTENDED RELEASE ORAL at 15:30

## 2020-01-01 RX ADMIN — OXYCODONE HYDROCHLORIDE AND ACETAMINOPHEN 1 TABLET: 5; 325 TABLET ORAL at 06:58

## 2020-01-01 RX ADMIN — HYDRALAZINE HYDROCHLORIDE 100 MG: 50 TABLET, FILM COATED ORAL at 22:00

## 2020-01-01 RX ADMIN — OXYCODONE HYDROCHLORIDE AND ACETAMINOPHEN 1 TABLET: 5; 325 TABLET ORAL at 17:13

## 2020-01-01 RX ADMIN — POTASSIUM CHLORIDE 10 MEQ: 14.9 INJECTION, SOLUTION INTRAVENOUS at 12:37

## 2020-01-01 RX ADMIN — OXYCODONE HYDROCHLORIDE AND ACETAMINOPHEN 2 TABLET: 5; 325 TABLET ORAL at 12:04

## 2020-01-01 RX ADMIN — CEFTRIAXONE 1 G: 1 INJECTION, POWDER, FOR SOLUTION INTRAMUSCULAR; INTRAVENOUS at 00:15

## 2020-01-01 RX ADMIN — HEPARIN SODIUM 2000 UNITS: 5000 INJECTION INTRAVENOUS; SUBCUTANEOUS at 15:00

## 2020-01-01 RX ADMIN — FENTANYL CITRATE 25 MCG: 50 INJECTION INTRAMUSCULAR; INTRAVENOUS at 00:08

## 2020-01-01 RX ADMIN — INSULIN GLARGINE 10 UNITS: 100 INJECTION, SOLUTION SUBCUTANEOUS at 22:20

## 2020-01-01 RX ADMIN — COLCHICINE 0.6 MG: 0.6 TABLET, FILM COATED ORAL at 17:52

## 2020-01-01 RX ADMIN — CARVEDILOL 12.5 MG: 12.5 TABLET, FILM COATED ORAL at 17:21

## 2020-01-01 RX ADMIN — FENTANYL CITRATE 50 MCG: 50 INJECTION, SOLUTION INTRAMUSCULAR; INTRAVENOUS at 07:39

## 2020-01-01 RX ADMIN — WARFARIN SODIUM 3 MG: 2 TABLET ORAL at 17:12

## 2020-01-01 RX ADMIN — DILTIAZEM HYDROCHLORIDE 180 MG: 180 CAPSULE, COATED, EXTENDED RELEASE ORAL at 08:45

## 2020-01-01 RX ADMIN — LABETALOL HYDROCHLORIDE 10 MG: 5 INJECTION INTRAVENOUS at 07:31

## 2020-01-01 RX ADMIN — METRONIDAZOLE 500 MG: 500 INJECTION, SOLUTION INTRAVENOUS at 17:07

## 2020-01-01 RX ADMIN — Medication 1 AMPULE: at 20:16

## 2020-01-01 RX ADMIN — HYDROMORPHONE HYDROCHLORIDE 0.5 MG: 2 INJECTION, SOLUTION INTRAMUSCULAR; INTRAVENOUS; SUBCUTANEOUS at 08:53

## 2020-01-01 RX ADMIN — MIDAZOLAM HYDROCHLORIDE 0.5 MG: 1 INJECTION, SOLUTION INTRAMUSCULAR; INTRAVENOUS at 17:07

## 2020-01-01 RX ADMIN — ROCURONIUM BROMIDE 5 MG: 10 INJECTION INTRAVENOUS at 07:39

## 2020-01-01 RX ADMIN — OXYCODONE HYDROCHLORIDE AND ACETAMINOPHEN 1 TABLET: 5; 325 TABLET ORAL at 11:30

## 2020-01-01 RX ADMIN — HYDRALAZINE HYDROCHLORIDE 100 MG: 50 TABLET, FILM COATED ORAL at 21:31

## 2020-01-01 RX ADMIN — FENTANYL CITRATE 50 MCG: 50 INJECTION, SOLUTION INTRAMUSCULAR; INTRAVENOUS at 08:09

## 2020-01-01 RX ADMIN — OXYCODONE HYDROCHLORIDE AND ACETAMINOPHEN 1 TABLET: 5; 325 TABLET ORAL at 09:59

## 2020-01-01 RX ADMIN — HYDRALAZINE HYDROCHLORIDE 100 MG: 50 TABLET, FILM COATED ORAL at 09:54

## 2020-01-01 RX ADMIN — BUMETANIDE 0.5 MG: 1 TABLET ORAL at 08:54

## 2020-01-01 RX ADMIN — HYDRALAZINE HYDROCHLORIDE 100 MG: 50 TABLET, FILM COATED ORAL at 21:44

## 2020-01-01 RX ADMIN — METOPROLOL TARTRATE 25 MG: 25 TABLET, FILM COATED ORAL at 08:47

## 2020-01-01 RX ADMIN — SODIUM CHLORIDE 1250 MG: 9 INJECTION, SOLUTION INTRAVENOUS at 15:31

## 2020-01-01 RX ADMIN — HYDRALAZINE HYDROCHLORIDE 100 MG: 50 TABLET, FILM COATED ORAL at 09:43

## 2020-01-01 RX ADMIN — Medication 10 ML: at 14:32

## 2020-01-01 RX ADMIN — Medication 10 ML: at 03:11

## 2020-01-01 RX ADMIN — BACITRACIN: 500 OINTMENT TOPICAL at 10:49

## 2020-01-01 RX ADMIN — Medication 10 ML: at 10:00

## 2020-01-01 RX ADMIN — BUSPIRONE HYDROCHLORIDE 10 MG: 10 TABLET ORAL at 17:27

## 2020-01-01 RX ADMIN — Medication 2 MG: at 08:33

## 2020-01-01 RX ADMIN — HYDROMORPHONE HYDROCHLORIDE 0.5 MG: 1 INJECTION, SOLUTION INTRAMUSCULAR; INTRAVENOUS; SUBCUTANEOUS at 21:40

## 2020-01-01 RX ADMIN — FENTANYL CITRATE 50 MCG: 50 INJECTION, SOLUTION INTRAMUSCULAR; INTRAVENOUS at 12:59

## 2020-01-01 RX ADMIN — OXYCODONE HYDROCHLORIDE AND ACETAMINOPHEN 1 TABLET: 5; 325 TABLET ORAL at 14:07

## 2020-01-01 RX ADMIN — NYSTATIN: 100000 CREAM TOPICAL at 17:31

## 2020-01-01 RX ADMIN — INSULIN GLARGINE 10 UNITS: 100 INJECTION, SOLUTION SUBCUTANEOUS at 08:38

## 2020-01-01 RX ADMIN — PROPOFOL 50 MG: 10 INJECTION, EMULSION INTRAVENOUS at 13:00

## 2020-01-01 RX ADMIN — MEROPENEM 500 MG: 500 INJECTION, POWDER, FOR SOLUTION INTRAVENOUS at 08:22

## 2020-01-01 RX ADMIN — NYSTATIN: 100000 CREAM TOPICAL at 09:58

## 2020-01-01 RX ADMIN — HYDRALAZINE HYDROCHLORIDE 100 MG: 50 TABLET, FILM COATED ORAL at 09:37

## 2020-01-01 RX ADMIN — HYDRALAZINE HYDROCHLORIDE 100 MG: 50 TABLET, FILM COATED ORAL at 20:04

## 2020-01-01 RX ADMIN — HYDRALAZINE HYDROCHLORIDE 100 MG: 50 TABLET, FILM COATED ORAL at 08:28

## 2020-01-01 RX ADMIN — Medication 10 ML: at 15:35

## 2020-01-01 RX ADMIN — GABAPENTIN 100 MG: 100 CAPSULE ORAL at 22:14

## 2020-01-01 RX ADMIN — BACITRACIN: 500 OINTMENT TOPICAL at 22:28

## 2020-01-01 RX ADMIN — Medication 1 AMPULE: at 22:05

## 2020-01-01 RX ADMIN — HYDRALAZINE HYDROCHLORIDE 100 MG: 50 TABLET, FILM COATED ORAL at 17:52

## 2020-01-01 RX ADMIN — NYSTATIN: 100000 CREAM TOPICAL at 17:27

## 2020-01-01 RX ADMIN — GABAPENTIN 100 MG: 100 CAPSULE ORAL at 15:48

## 2020-01-01 RX ADMIN — BUMETANIDE 1 MG: 0.25 INJECTION INTRAMUSCULAR; INTRAVENOUS at 17:10

## 2020-01-01 RX ADMIN — HYDRALAZINE HYDROCHLORIDE 100 MG: 50 TABLET, FILM COATED ORAL at 18:01

## 2020-01-01 RX ADMIN — Medication 10 ML: at 05:14

## 2020-01-01 RX ADMIN — DILTIAZEM HYDROCHLORIDE 180 MG: 180 CAPSULE, COATED, EXTENDED RELEASE ORAL at 08:48

## 2020-01-01 RX ADMIN — BUMETANIDE 0.5 MG: 1 TABLET ORAL at 15:44

## 2020-01-01 RX ADMIN — BUSPIRONE HYDROCHLORIDE 10 MG: 10 TABLET ORAL at 17:17

## 2020-01-01 RX ADMIN — BACITRACIN: 500 OINTMENT TOPICAL at 16:26

## 2020-01-01 RX ADMIN — SODIUM CHLORIDE, SODIUM LACTATE, POTASSIUM CHLORIDE, AND CALCIUM CHLORIDE 125 ML/HR: 600; 310; 30; 20 INJECTION, SOLUTION INTRAVENOUS at 08:21

## 2020-01-01 RX ADMIN — SERTRALINE HYDROCHLORIDE 50 MG: 50 TABLET ORAL at 09:08

## 2020-01-01 RX ADMIN — FENTANYL CITRATE 50 MCG: 50 INJECTION, SOLUTION INTRAMUSCULAR; INTRAVENOUS at 10:51

## 2020-01-01 RX ADMIN — HYDRALAZINE HYDROCHLORIDE 100 MG: 50 TABLET, FILM COATED ORAL at 09:03

## 2020-01-01 RX ADMIN — METOPROLOL TARTRATE 25 MG: 25 TABLET, FILM COATED ORAL at 08:49

## 2020-01-01 RX ADMIN — HYDROMORPHONE HYDROCHLORIDE 1 MG: 1 INJECTION, SOLUTION INTRAMUSCULAR; INTRAVENOUS; SUBCUTANEOUS at 20:15

## 2020-01-01 RX ADMIN — HYDROMORPHONE HYDROCHLORIDE 2 MG: 2 TABLET ORAL at 21:23

## 2020-01-01 RX ADMIN — BUSPIRONE HYDROCHLORIDE 10 MG: 10 TABLET ORAL at 08:15

## 2020-01-01 RX ADMIN — METOPROLOL SUCCINATE 50 MG: 50 TABLET, EXTENDED RELEASE ORAL at 09:07

## 2020-01-01 RX ADMIN — DILTIAZEM HYDROCHLORIDE 180 MG: 180 CAPSULE, COATED, EXTENDED RELEASE ORAL at 15:57

## 2020-01-01 RX ADMIN — Medication 10 ML: at 21:08

## 2020-01-01 RX ADMIN — HUMAN INSULIN 2 UNITS: 100 INJECTION, SOLUTION SUBCUTANEOUS at 13:06

## 2020-01-01 RX ADMIN — SUCRALFATE 1 G: 1 TABLET ORAL at 21:28

## 2020-01-01 RX ADMIN — BUMETANIDE 1 MG: 0.25 INJECTION INTRAMUSCULAR; INTRAVENOUS at 08:47

## 2020-01-01 RX ADMIN — WARFARIN SODIUM 2 MG: 2 TABLET ORAL at 17:35

## 2020-01-01 RX ADMIN — HYDRALAZINE HYDROCHLORIDE 100 MG: 50 TABLET, FILM COATED ORAL at 22:22

## 2020-01-01 RX ADMIN — HUMAN INSULIN 2 UNITS: 100 INJECTION, SOLUTION SUBCUTANEOUS at 22:45

## 2020-01-01 RX ADMIN — DAPTOMYCIN 900 MG: 500 INJECTION, POWDER, LYOPHILIZED, FOR SOLUTION INTRAVENOUS at 13:06

## 2020-01-01 RX ADMIN — INSULIN GLARGINE 10 UNITS: 100 INJECTION, SOLUTION SUBCUTANEOUS at 22:01

## 2020-01-01 RX ADMIN — BUSPIRONE HYDROCHLORIDE 10 MG: 10 TABLET ORAL at 08:28

## 2020-01-01 RX ADMIN — Medication 10 ML: at 16:07

## 2020-01-01 RX ADMIN — OXYCODONE HYDROCHLORIDE AND ACETAMINOPHEN 1 TABLET: 5; 325 TABLET ORAL at 17:15

## 2020-01-01 RX ADMIN — METOPROLOL TARTRATE 25 MG: 25 TABLET, FILM COATED ORAL at 17:04

## 2020-01-01 RX ADMIN — EPOETIN ALFA-EPBX 20000 UNITS: 10000 INJECTION, SOLUTION INTRAVENOUS; SUBCUTANEOUS at 23:43

## 2020-01-01 RX ADMIN — SERTRALINE HYDROCHLORIDE 75 MG: 50 TABLET ORAL at 08:57

## 2020-01-01 RX ADMIN — PROTAMINE SULFATE 20 MG: 10 INJECTION, SOLUTION INTRAVENOUS at 15:44

## 2020-01-01 RX ADMIN — HYDRALAZINE HYDROCHLORIDE 50 MG: 50 TABLET, FILM COATED ORAL at 21:07

## 2020-01-01 RX ADMIN — HYDROMORPHONE HYDROCHLORIDE 0.5 MG: 1 INJECTION, SOLUTION INTRAMUSCULAR; INTRAVENOUS; SUBCUTANEOUS at 12:39

## 2020-01-01 RX ADMIN — BUMETANIDE 2 MG: 1 TABLET ORAL at 07:07

## 2020-01-01 RX ADMIN — HYDROMORPHONE HYDROCHLORIDE 0.5 MG: 1 INJECTION, SOLUTION INTRAMUSCULAR; INTRAVENOUS; SUBCUTANEOUS at 22:18

## 2020-01-01 RX ADMIN — PANTOPRAZOLE SODIUM 40 MG: 40 TABLET, DELAYED RELEASE ORAL at 16:04

## 2020-01-01 RX ADMIN — DIPHENHYDRAMINE HYDROCHLORIDE 12.5 MG: 50 INJECTION, SOLUTION INTRAMUSCULAR; INTRAVENOUS at 18:59

## 2020-01-01 RX ADMIN — SODIUM CHLORIDE 20 ML/HR: 900 INJECTION, SOLUTION INTRAVENOUS at 12:57

## 2020-01-01 RX ADMIN — BUSPIRONE HYDROCHLORIDE 10 MG: 10 TABLET ORAL at 18:09

## 2020-01-01 RX ADMIN — Medication 10 ML: at 15:03

## 2020-01-01 RX ADMIN — PANTOPRAZOLE SODIUM 40 MG: 40 INJECTION, POWDER, FOR SOLUTION INTRAVENOUS at 08:58

## 2020-01-01 RX ADMIN — HYDRALAZINE HYDROCHLORIDE 25 MG: 25 TABLET, FILM COATED ORAL at 08:40

## 2020-01-01 RX ADMIN — VANCOMYCIN HYDROCHLORIDE 1500 MG: 10 INJECTION, POWDER, LYOPHILIZED, FOR SOLUTION INTRAVENOUS at 10:31

## 2020-01-01 RX ADMIN — SODIUM CHLORIDE 12.5 MG/HR: 900 INJECTION, SOLUTION INTRAVENOUS at 03:19

## 2020-01-01 RX ADMIN — HYDROMORPHONE HYDROCHLORIDE 3 MG: 2 TABLET ORAL at 09:20

## 2020-01-01 RX ADMIN — HYDRALAZINE HYDROCHLORIDE 100 MG: 50 TABLET, FILM COATED ORAL at 16:35

## 2020-01-01 RX ADMIN — Medication 10 ML: at 06:10

## 2020-01-01 RX ADMIN — HYDROMORPHONE HYDROCHLORIDE 3 MG: 2 TABLET ORAL at 19:45

## 2020-01-01 RX ADMIN — ENOXAPARIN SODIUM 40 MG: 40 INJECTION SUBCUTANEOUS at 11:39

## 2020-01-01 RX ADMIN — METOPROLOL TARTRATE 25 MG: 25 TABLET, FILM COATED ORAL at 17:10

## 2020-01-01 RX ADMIN — MIDAZOLAM HYDROCHLORIDE 1 MG: 1 INJECTION, SOLUTION INTRAMUSCULAR; INTRAVENOUS at 16:23

## 2020-01-01 RX ADMIN — Medication 10 ML: at 20:59

## 2020-01-01 RX ADMIN — BUMETANIDE 1 MG: 0.25 INJECTION INTRAMUSCULAR; INTRAVENOUS at 09:44

## 2020-01-01 RX ADMIN — HYDROMORPHONE HYDROCHLORIDE 1 MG: 1 INJECTION, SOLUTION INTRAMUSCULAR; INTRAVENOUS; SUBCUTANEOUS at 22:38

## 2020-01-01 RX ADMIN — WARFARIN SODIUM 3 MG: 2 TABLET ORAL at 18:15

## 2020-01-01 RX ADMIN — DILTIAZEM HYDROCHLORIDE 180 MG: 180 CAPSULE, COATED, EXTENDED RELEASE ORAL at 08:13

## 2020-01-01 RX ADMIN — WARFARIN SODIUM 7.5 MG: 7.5 TABLET ORAL at 18:12

## 2020-01-01 RX ADMIN — FENTANYL CITRATE 25 MCG: 50 INJECTION INTRAMUSCULAR; INTRAVENOUS at 15:28

## 2020-01-01 RX ADMIN — DAPTOMYCIN 900 MG: 500 INJECTION, POWDER, LYOPHILIZED, FOR SOLUTION INTRAVENOUS at 11:40

## 2020-01-01 RX ADMIN — HYDROMORPHONE HYDROCHLORIDE 0.5 MG: 1 INJECTION, SOLUTION INTRAMUSCULAR; INTRAVENOUS; SUBCUTANEOUS at 03:22

## 2020-01-01 RX ADMIN — POTASSIUM CHLORIDE 10 MEQ: 14.9 INJECTION, SOLUTION INTRAVENOUS at 16:00

## 2020-01-01 RX ADMIN — BUSPIRONE HYDROCHLORIDE 10 MG: 10 TABLET ORAL at 11:08

## 2020-01-01 RX ADMIN — Medication 10 ML: at 17:26

## 2020-01-01 RX ADMIN — BUSPIRONE HYDROCHLORIDE 10 MG: 10 TABLET ORAL at 09:07

## 2020-01-01 RX ADMIN — HYDROMORPHONE HYDROCHLORIDE 0.5 MG: 1 INJECTION, SOLUTION INTRAMUSCULAR; INTRAVENOUS; SUBCUTANEOUS at 20:29

## 2020-01-01 RX ADMIN — Medication 10 ML: at 07:53

## 2020-01-01 RX ADMIN — HYDRALAZINE HYDROCHLORIDE 100 MG: 50 TABLET, FILM COATED ORAL at 22:35

## 2020-01-01 RX ADMIN — BUSPIRONE HYDROCHLORIDE 10 MG: 10 TABLET ORAL at 18:20

## 2020-01-01 RX ADMIN — CARVEDILOL 12.5 MG: 12.5 TABLET, FILM COATED ORAL at 17:05

## 2020-01-01 RX ADMIN — SERTRALINE HYDROCHLORIDE 75 MG: 50 TABLET ORAL at 09:21

## 2020-01-01 RX ADMIN — Medication 1 AMPULE: at 21:01

## 2020-01-01 RX ADMIN — POTASSIUM CHLORIDE 20 MEQ: 750 TABLET, FILM COATED, EXTENDED RELEASE ORAL at 09:10

## 2020-01-01 RX ADMIN — NYSTATIN: 100000 CREAM TOPICAL at 09:43

## 2020-01-01 RX ADMIN — WARFARIN SODIUM 5 MG: 5 TABLET ORAL at 17:41

## 2020-01-01 RX ADMIN — Medication 10 ML: at 05:59

## 2020-01-01 RX ADMIN — HYDROMORPHONE HYDROCHLORIDE 3 MG: 2 TABLET ORAL at 16:06

## 2020-01-01 RX ADMIN — PANTOPRAZOLE SODIUM 40 MG: 40 TABLET, DELAYED RELEASE ORAL at 17:17

## 2020-01-01 RX ADMIN — HYDROMORPHONE HYDROCHLORIDE 0.5 MG: 1 INJECTION, SOLUTION INTRAMUSCULAR; INTRAVENOUS; SUBCUTANEOUS at 14:19

## 2020-01-01 RX ADMIN — BUMETANIDE 2 MG: 1 TABLET ORAL at 08:54

## 2020-01-01 RX ADMIN — Medication 10 ML: at 06:09

## 2020-01-01 RX ADMIN — VANCOMYCIN HYDROCHLORIDE 1250 MG: 10 INJECTION, POWDER, LYOPHILIZED, FOR SOLUTION INTRAVENOUS at 14:41

## 2020-01-01 RX ADMIN — HYDRALAZINE HYDROCHLORIDE 100 MG: 50 TABLET, FILM COATED ORAL at 08:41

## 2020-01-01 RX ADMIN — BUSPIRONE HYDROCHLORIDE 10 MG: 10 TABLET ORAL at 09:33

## 2020-01-01 RX ADMIN — HYDRALAZINE HYDROCHLORIDE 100 MG: 50 TABLET, FILM COATED ORAL at 15:58

## 2020-01-01 RX ADMIN — BUMETANIDE 2 MG: 1 TABLET ORAL at 09:10

## 2020-01-01 RX ADMIN — Medication 10 ML: at 15:58

## 2020-01-01 RX ADMIN — NYSTATIN: 100000 CREAM TOPICAL at 18:29

## 2020-01-01 RX ADMIN — OXYCODONE HYDROCHLORIDE AND ACETAMINOPHEN 2 TABLET: 5; 325 TABLET ORAL at 23:36

## 2020-01-01 RX ADMIN — GABAPENTIN 100 MG: 100 CAPSULE ORAL at 08:34

## 2020-01-01 RX ADMIN — HYDROMORPHONE HYDROCHLORIDE 0.5 MG: 1 INJECTION, SOLUTION INTRAMUSCULAR; INTRAVENOUS; SUBCUTANEOUS at 18:34

## 2020-01-01 RX ADMIN — FENTANYL CITRATE 50 MCG: 50 INJECTION, SOLUTION INTRAMUSCULAR; INTRAVENOUS at 14:27

## 2020-01-01 RX ADMIN — Medication 5 ML: at 12:32

## 2020-01-01 RX ADMIN — Medication 20 ML: at 14:19

## 2020-01-01 RX ADMIN — FUROSEMIDE 60 MG: 10 INJECTION, SOLUTION INTRAMUSCULAR; INTRAVENOUS at 01:30

## 2020-01-01 RX ADMIN — VANCOMYCIN HYDROCHLORIDE 1500 MG: 10 INJECTION, POWDER, LYOPHILIZED, FOR SOLUTION INTRAVENOUS at 02:18

## 2020-01-01 RX ADMIN — BUMETANIDE 1 MG: 0.25 INJECTION INTRAMUSCULAR; INTRAVENOUS at 08:48

## 2020-01-01 RX ADMIN — FENTANYL CITRATE 25 MCG: 50 INJECTION INTRAMUSCULAR; INTRAVENOUS at 09:08

## 2020-01-01 RX ADMIN — SERTRALINE HYDROCHLORIDE 75 MG: 50 TABLET ORAL at 11:37

## 2020-01-01 RX ADMIN — DILTIAZEM HYDROCHLORIDE 180 MG: 180 CAPSULE, COATED, EXTENDED RELEASE ORAL at 09:54

## 2020-01-01 RX ADMIN — SERTRALINE HYDROCHLORIDE 50 MG: 50 TABLET ORAL at 09:46

## 2020-01-01 RX ADMIN — ACETAMINOPHEN 650 MG: 325 TABLET ORAL at 06:04

## 2020-01-01 RX ADMIN — COLCHICINE 0.6 MG: 0.6 TABLET, FILM COATED ORAL at 12:45

## 2020-01-01 RX ADMIN — Medication 80 MCG: at 15:01

## 2020-01-01 RX ADMIN — HYDRALAZINE HYDROCHLORIDE 50 MG: 50 TABLET, FILM COATED ORAL at 08:13

## 2020-01-01 RX ADMIN — HYDRALAZINE HYDROCHLORIDE 100 MG: 50 TABLET, FILM COATED ORAL at 17:19

## 2020-01-01 RX ADMIN — ENOXAPARIN SODIUM 100 MG: 100 INJECTION SUBCUTANEOUS at 10:11

## 2020-01-01 RX ADMIN — OXYCODONE HYDROCHLORIDE AND ACETAMINOPHEN 1 TABLET: 5; 325 TABLET ORAL at 16:21

## 2020-01-01 RX ADMIN — Medication 10 ML: at 17:12

## 2020-01-01 RX ADMIN — SERTRALINE HYDROCHLORIDE 75 MG: 50 TABLET ORAL at 08:53

## 2020-01-01 RX ADMIN — PANTOPRAZOLE SODIUM 40 MG: 40 TABLET, DELAYED RELEASE ORAL at 08:25

## 2020-01-01 RX ADMIN — METOPROLOL TARTRATE 25 MG: 25 TABLET, FILM COATED ORAL at 09:04

## 2020-01-01 RX ADMIN — HYDRALAZINE HYDROCHLORIDE 100 MG: 50 TABLET, FILM COATED ORAL at 17:31

## 2020-01-01 RX ADMIN — HYDRALAZINE HYDROCHLORIDE 100 MG: 50 TABLET, FILM COATED ORAL at 22:10

## 2020-01-01 RX ADMIN — HYDRALAZINE HYDROCHLORIDE 100 MG: 50 TABLET, FILM COATED ORAL at 16:01

## 2020-01-01 RX ADMIN — SODIUM CHLORIDE 1250 MG: 9 INJECTION, SOLUTION INTRAVENOUS at 06:36

## 2020-01-01 RX ADMIN — HYDROMORPHONE HYDROCHLORIDE 2 MG: 2 TABLET ORAL at 00:25

## 2020-01-01 RX ADMIN — DIPHENHYDRAMINE HYDROCHLORIDE 50 MG: 25 CAPSULE ORAL at 21:28

## 2020-01-01 RX ADMIN — FENTANYL CITRATE 50 MCG: 50 INJECTION INTRAMUSCULAR; INTRAVENOUS at 15:45

## 2020-01-01 RX ADMIN — GABAPENTIN 100 MG: 100 CAPSULE ORAL at 17:21

## 2020-01-01 RX ADMIN — BUMETANIDE 1 MG: 1 TABLET ORAL at 09:08

## 2020-01-01 RX ADMIN — HYDROMORPHONE HYDROCHLORIDE 0.5 MG: 1 INJECTION, SOLUTION INTRAMUSCULAR; INTRAVENOUS; SUBCUTANEOUS at 15:13

## 2020-01-01 RX ADMIN — MEROPENEM 500 MG: 500 INJECTION, POWDER, FOR SOLUTION INTRAVENOUS at 14:41

## 2020-01-01 RX ADMIN — SERTRALINE HYDROCHLORIDE 50 MG: 50 TABLET ORAL at 08:06

## 2020-01-01 RX ADMIN — TRAMADOL HYDROCHLORIDE 50 MG: 50 TABLET, FILM COATED ORAL at 20:18

## 2020-01-01 RX ADMIN — DILTIAZEM HYDROCHLORIDE 5 MG/HR: 5 INJECTION, SOLUTION INTRAVENOUS at 10:10

## 2020-01-01 RX ADMIN — HUMAN INSULIN 2 UNITS: 100 INJECTION, SOLUTION SUBCUTANEOUS at 21:44

## 2020-01-01 RX ADMIN — HUMAN INSULIN 3 UNITS: 100 INJECTION, SOLUTION SUBCUTANEOUS at 17:30

## 2020-01-01 RX ADMIN — WARFARIN SODIUM 2 MG: 1 TABLET ORAL at 17:59

## 2020-01-01 RX ADMIN — BUMETANIDE 1 MG: 0.25 INJECTION INTRAMUSCULAR; INTRAVENOUS at 08:50

## 2020-01-01 RX ADMIN — Medication 3 AMPULE: at 06:58

## 2020-01-01 RX ADMIN — BUMETANIDE 1 MG: 0.25 INJECTION INTRAMUSCULAR; INTRAVENOUS at 08:44

## 2020-01-01 RX ADMIN — COLCHICINE 0.6 MG: 0.6 TABLET, FILM COATED ORAL at 08:44

## 2020-01-01 RX ADMIN — HYDRALAZINE HYDROCHLORIDE 100 MG: 50 TABLET, FILM COATED ORAL at 22:05

## 2020-01-01 RX ADMIN — OXYCODONE HYDROCHLORIDE AND ACETAMINOPHEN 1 TABLET: 5; 325 TABLET ORAL at 22:55

## 2020-01-01 RX ADMIN — PROPOFOL 30 MG: 10 INJECTION, EMULSION INTRAVENOUS at 11:52

## 2020-01-01 RX ADMIN — HYDRALAZINE HYDROCHLORIDE 50 MG: 50 TABLET, FILM COATED ORAL at 15:23

## 2020-01-01 RX ADMIN — HYDRALAZINE HYDROCHLORIDE 100 MG: 50 TABLET, FILM COATED ORAL at 15:04

## 2020-01-01 RX ADMIN — ENOXAPARIN SODIUM 100 MG: 100 INJECTION SUBCUTANEOUS at 21:01

## 2020-01-01 RX ADMIN — ACETAMINOPHEN 650 MG: 325 TABLET, FILM COATED ORAL at 22:46

## 2020-01-01 RX ADMIN — WARFARIN SODIUM 2 MG: 2 TABLET ORAL at 17:17

## 2020-01-01 RX ADMIN — ONDANSETRON HYDROCHLORIDE 4 MG: 2 INJECTION, SOLUTION INTRAMUSCULAR; INTRAVENOUS at 11:22

## 2020-01-01 RX ADMIN — Medication 10 ML: at 22:34

## 2020-01-01 RX ADMIN — WARFARIN SODIUM 6 MG: 5 TABLET ORAL at 18:04

## 2020-01-01 RX ADMIN — SODIUM CHLORIDE 1000 ML: 900 INJECTION, SOLUTION INTRAVENOUS at 00:22

## 2020-01-01 RX ADMIN — NYSTATIN: 100000 CREAM TOPICAL at 09:42

## 2020-01-01 RX ADMIN — DILTIAZEM HYDROCHLORIDE 180 MG: 180 CAPSULE, COATED, EXTENDED RELEASE ORAL at 08:54

## 2020-01-01 RX ADMIN — PANTOPRAZOLE SODIUM 40 MG: 40 TABLET, DELAYED RELEASE ORAL at 16:15

## 2020-01-01 RX ADMIN — METOPROLOL TARTRATE 25 MG: 25 TABLET, FILM COATED ORAL at 10:12

## 2020-01-01 RX ADMIN — Medication 10 ML: at 22:45

## 2020-01-01 RX ADMIN — Medication 10 ML: at 21:28

## 2020-01-01 RX ADMIN — HUMAN INSULIN 2 UNITS: 100 INJECTION, SOLUTION SUBCUTANEOUS at 11:30

## 2020-01-01 RX ADMIN — ACETAMINOPHEN 650 MG: 325 TABLET ORAL at 16:02

## 2020-01-01 RX ADMIN — INSULIN GLARGINE 10 UNITS: 100 INJECTION, SOLUTION SUBCUTANEOUS at 08:43

## 2020-01-01 RX ADMIN — HYDRALAZINE HYDROCHLORIDE 100 MG: 50 TABLET, FILM COATED ORAL at 16:04

## 2020-01-01 RX ADMIN — INSULIN LISPRO 4 UNITS: 100 INJECTION, SOLUTION INTRAVENOUS; SUBCUTANEOUS at 12:04

## 2020-01-01 RX ADMIN — HYDROMORPHONE HYDROCHLORIDE 0.5 MG: 1 INJECTION, SOLUTION INTRAMUSCULAR; INTRAVENOUS; SUBCUTANEOUS at 03:51

## 2020-01-01 RX ADMIN — HYDRALAZINE HYDROCHLORIDE 100 MG: 50 TABLET, FILM COATED ORAL at 08:45

## 2020-01-01 RX ADMIN — SUCCINYLCHOLINE CHLORIDE 140 MG: 20 INJECTION, SOLUTION INTRAMUSCULAR; INTRAVENOUS at 10:21

## 2020-01-01 RX ADMIN — HYDROMORPHONE HYDROCHLORIDE 3 MG: 2 TABLET ORAL at 21:58

## 2020-01-01 RX ADMIN — HYDROMORPHONE HYDROCHLORIDE 1 MG: 1 INJECTION, SOLUTION INTRAMUSCULAR; INTRAVENOUS; SUBCUTANEOUS at 03:57

## 2020-01-01 RX ADMIN — INSULIN LISPRO 2 UNITS: 100 INJECTION, SOLUTION INTRAVENOUS; SUBCUTANEOUS at 17:04

## 2020-01-01 RX ADMIN — INSULIN LISPRO 2 UNITS: 100 INJECTION, SOLUTION INTRAVENOUS; SUBCUTANEOUS at 17:21

## 2020-01-01 RX ADMIN — HYDRALAZINE HYDROCHLORIDE 100 MG: 50 TABLET, FILM COATED ORAL at 17:16

## 2020-01-01 RX ADMIN — Medication 10 ML: at 23:09

## 2020-01-01 RX ADMIN — FLUCONAZOLE 200 MG: 100 TABLET ORAL at 10:42

## 2020-01-01 RX ADMIN — Medication 1 AMPULE: at 16:13

## 2020-01-01 RX ADMIN — METOPROLOL TARTRATE 5 MG: 5 INJECTION INTRAVENOUS at 21:11

## 2020-01-01 RX ADMIN — NYSTATIN: 100000 CREAM TOPICAL at 08:47

## 2020-01-01 RX ADMIN — VANCOMYCIN HYDROCHLORIDE 1000 MG: 1 INJECTION, POWDER, LYOPHILIZED, FOR SOLUTION INTRAVENOUS at 02:20

## 2020-01-01 RX ADMIN — Medication 10 ML: at 21:45

## 2020-01-01 RX ADMIN — Medication: at 19:58

## 2020-01-01 RX ADMIN — FENTANYL CITRATE 50 MCG: 50 INJECTION, SOLUTION INTRAMUSCULAR; INTRAVENOUS at 10:48

## 2020-01-01 RX ADMIN — VANCOMYCIN HYDROCHLORIDE 1250 MG: 10 INJECTION, POWDER, LYOPHILIZED, FOR SOLUTION INTRAVENOUS at 19:07

## 2020-01-01 RX ADMIN — HYDRALAZINE HYDROCHLORIDE 100 MG: 50 TABLET, FILM COATED ORAL at 21:01

## 2020-01-01 RX ADMIN — HYDRALAZINE HYDROCHLORIDE 100 MG: 50 TABLET, FILM COATED ORAL at 17:12

## 2020-01-01 RX ADMIN — BUMETANIDE 1 MG: 1 TABLET ORAL at 17:33

## 2020-01-01 RX ADMIN — DILTIAZEM HYDROCHLORIDE 180 MG: 180 CAPSULE, COATED, EXTENDED RELEASE ORAL at 08:57

## 2020-01-01 RX ADMIN — DILTIAZEM HYDROCHLORIDE 180 MG: 180 CAPSULE, COATED, EXTENDED RELEASE ORAL at 09:21

## 2020-01-01 RX ADMIN — BUMETANIDE 0.5 MG: 1 TABLET ORAL at 18:09

## 2020-01-01 RX ADMIN — HYDROMORPHONE HYDROCHLORIDE 1 MG: 1 INJECTION, SOLUTION INTRAMUSCULAR; INTRAVENOUS; SUBCUTANEOUS at 00:07

## 2020-01-01 RX ADMIN — HYDROMORPHONE HYDROCHLORIDE 3 MG: 2 TABLET ORAL at 11:08

## 2020-01-01 RX ADMIN — BUSPIRONE HYDROCHLORIDE 10 MG: 10 TABLET ORAL at 17:33

## 2020-01-01 RX ADMIN — SODIUM CHLORIDE: 900 INJECTION, SOLUTION INTRAVENOUS at 11:36

## 2020-01-01 RX ADMIN — Medication 10 ML: at 13:55

## 2020-01-01 RX ADMIN — OXYCODONE HYDROCHLORIDE AND ACETAMINOPHEN 1 TABLET: 5; 325 TABLET ORAL at 21:16

## 2020-01-01 RX ADMIN — HEPARIN SODIUM AND DEXTROSE 11 UNITS/KG/HR: 10000; 5 INJECTION INTRAVENOUS at 15:00

## 2020-01-01 RX ADMIN — BUSPIRONE HYDROCHLORIDE 10 MG: 10 TABLET ORAL at 18:56

## 2020-01-01 RX ADMIN — HYDRALAZINE HYDROCHLORIDE 100 MG: 50 TABLET, FILM COATED ORAL at 21:00

## 2020-01-01 RX ADMIN — HYDRALAZINE HYDROCHLORIDE 50 MG: 50 TABLET, FILM COATED ORAL at 22:46

## 2020-01-01 RX ADMIN — BUSPIRONE HYDROCHLORIDE 10 MG: 10 TABLET ORAL at 08:52

## 2020-01-01 RX ADMIN — ENOXAPARIN SODIUM 100 MG: 100 INJECTION SUBCUTANEOUS at 09:24

## 2020-01-01 RX ADMIN — SODIUM CHLORIDE: 900 INJECTION, SOLUTION INTRAVENOUS at 11:46

## 2020-01-01 RX ADMIN — DILTIAZEM HYDROCHLORIDE 180 MG: 180 CAPSULE, COATED, EXTENDED RELEASE ORAL at 08:46

## 2020-01-01 RX ADMIN — Medication 10 ML: at 19:00

## 2020-01-01 RX ADMIN — BUSPIRONE HYDROCHLORIDE 10 MG: 10 TABLET ORAL at 09:09

## 2020-01-01 RX ADMIN — SERTRALINE HYDROCHLORIDE 75 MG: 50 TABLET ORAL at 08:09

## 2020-01-01 RX ADMIN — BUSPIRONE HYDROCHLORIDE 10 MG: 10 TABLET ORAL at 22:47

## 2020-01-01 RX ADMIN — HUMAN INSULIN 2 UNITS: 100 INJECTION, SOLUTION SUBCUTANEOUS at 12:44

## 2020-01-01 RX ADMIN — DOXAZOSIN 4 MG: 2 TABLET ORAL at 22:14

## 2020-01-01 RX ADMIN — Medication 10 ML: at 17:00

## 2020-01-01 RX ADMIN — MEROPENEM 500 MG: 500 INJECTION, POWDER, FOR SOLUTION INTRAVENOUS at 11:37

## 2020-01-01 RX ADMIN — ONDANSETRON 4 MG: 2 INJECTION INTRAMUSCULAR; INTRAVENOUS at 04:34

## 2020-01-01 RX ADMIN — OXYCODONE HYDROCHLORIDE AND ACETAMINOPHEN 1 TABLET: 5; 325 TABLET ORAL at 09:50

## 2020-01-01 RX ADMIN — POTASSIUM CHLORIDE 20 MEQ: 750 TABLET, FILM COATED, EXTENDED RELEASE ORAL at 08:28

## 2020-01-01 RX ADMIN — SUCCINYLCHOLINE CHLORIDE 160 MG: 20 INJECTION, SOLUTION INTRAMUSCULAR; INTRAVENOUS at 12:59

## 2020-01-01 RX ADMIN — SERTRALINE HYDROCHLORIDE 125 MG: 50 TABLET ORAL at 10:43

## 2020-01-01 RX ADMIN — LEVOFLOXACIN 750 MG: 5 INJECTION, SOLUTION INTRAVENOUS at 17:22

## 2020-01-01 RX ADMIN — HYDROMORPHONE HYDROCHLORIDE 0.5 MG: 1 INJECTION, SOLUTION INTRAMUSCULAR; INTRAVENOUS; SUBCUTANEOUS at 23:27

## 2020-01-01 RX ADMIN — DEXAMETHASONE SODIUM PHOSPHATE 4 MG: 4 INJECTION, SOLUTION INTRAMUSCULAR; INTRAVENOUS at 08:15

## 2020-01-01 RX ADMIN — BUSPIRONE HYDROCHLORIDE 10 MG: 10 TABLET ORAL at 10:04

## 2020-01-01 RX ADMIN — Medication 10 ML: at 11:29

## 2020-01-01 RX ADMIN — ENOXAPARIN SODIUM 100 MG: 100 INJECTION SUBCUTANEOUS at 21:37

## 2020-01-01 RX ADMIN — HYDRALAZINE HYDROCHLORIDE 100 MG: 50 TABLET, FILM COATED ORAL at 22:24

## 2020-01-01 RX ADMIN — COLCHICINE 0.6 MG: 0.6 TABLET, FILM COATED ORAL at 17:03

## 2020-01-01 RX ADMIN — HYDRALAZINE HYDROCHLORIDE 50 MG: 50 TABLET, FILM COATED ORAL at 17:17

## 2020-01-01 RX ADMIN — METOPROLOL TARTRATE 25 MG: 25 TABLET, FILM COATED ORAL at 09:44

## 2020-01-01 RX ADMIN — BUSPIRONE HYDROCHLORIDE 10 MG: 10 TABLET ORAL at 09:54

## 2020-01-01 RX ADMIN — Medication 10 ML: at 05:45

## 2020-01-01 RX ADMIN — HYDRALAZINE HYDROCHLORIDE 100 MG: 50 TABLET, FILM COATED ORAL at 18:28

## 2020-01-01 RX ADMIN — PHENYLEPHRINE HYDROCHLORIDE 40 MCG/MIN: 10 INJECTION INTRAVENOUS at 15:03

## 2020-01-01 RX ADMIN — MEROPENEM 500 MG: 500 INJECTION, POWDER, FOR SOLUTION INTRAVENOUS at 05:55

## 2020-01-01 RX ADMIN — Medication 10 ML: at 06:33

## 2020-01-01 RX ADMIN — DILTIAZEM HYDROCHLORIDE 180 MG: 180 CAPSULE, COATED, EXTENDED RELEASE ORAL at 08:07

## 2020-01-01 RX ADMIN — BACITRACIN: 500 OINTMENT TOPICAL at 22:05

## 2020-01-01 RX ADMIN — MIDAZOLAM HYDROCHLORIDE 2 MG: 1 INJECTION, SOLUTION INTRAMUSCULAR; INTRAVENOUS at 07:32

## 2020-01-01 RX ADMIN — WARFARIN SODIUM 2 MG: 2 TABLET ORAL at 18:50

## 2020-01-01 RX ADMIN — SODIUM CHLORIDE 20 ML/HR: 900 INJECTION, SOLUTION INTRAVENOUS at 06:51

## 2020-01-01 RX ADMIN — HYDROMORPHONE HYDROCHLORIDE 3 MG: 2 TABLET ORAL at 06:03

## 2020-01-01 RX ADMIN — MEROPENEM 500 MG: 500 INJECTION, POWDER, FOR SOLUTION INTRAVENOUS at 20:55

## 2020-01-01 RX ADMIN — Medication 10 ML: at 18:02

## 2020-01-01 RX ADMIN — PROPOFOL 75 MCG/KG/MIN: 10 INJECTION, EMULSION INTRAVENOUS at 12:10

## 2020-01-01 RX ADMIN — BUMETANIDE 1 MG: 0.25 INJECTION, SOLUTION INTRAMUSCULAR; INTRAVENOUS at 16:13

## 2020-01-01 RX ADMIN — SODIUM CHLORIDE 80 ML/HR: 900 INJECTION, SOLUTION INTRAVENOUS at 17:14

## 2020-01-01 RX ADMIN — DILTIAZEM HYDROCHLORIDE 180 MG: 180 CAPSULE, COATED, EXTENDED RELEASE ORAL at 09:23

## 2020-01-01 RX ADMIN — AZITHROMYCIN MONOHYDRATE 500 MG: 500 INJECTION, POWDER, LYOPHILIZED, FOR SOLUTION INTRAVENOUS at 00:49

## 2020-01-01 RX ADMIN — AZITHROMYCIN MONOHYDRATE 500 MG: 500 INJECTION, POWDER, LYOPHILIZED, FOR SOLUTION INTRAVENOUS at 00:38

## 2020-01-01 RX ADMIN — HYDROMORPHONE HYDROCHLORIDE 3 MG: 2 TABLET ORAL at 20:24

## 2020-01-01 RX ADMIN — TRAMADOL HYDROCHLORIDE 50 MG: 50 TABLET, FILM COATED ORAL at 11:29

## 2020-01-01 RX ADMIN — WARFARIN SODIUM 2 MG: 2 TABLET ORAL at 02:32

## 2020-01-01 RX ADMIN — BUMETANIDE 1 MG: 0.25 INJECTION INTRAMUSCULAR; INTRAVENOUS at 09:06

## 2020-01-01 RX ADMIN — HYDROMORPHONE HYDROCHLORIDE 3 MG: 2 TABLET ORAL at 02:36

## 2020-01-01 RX ADMIN — SERTRALINE HYDROCHLORIDE 75 MG: 50 TABLET ORAL at 09:03

## 2020-01-01 RX ADMIN — Medication 10 ML: at 15:38

## 2020-01-01 RX ADMIN — FLUCONAZOLE 200 MG: 100 TABLET ORAL at 08:46

## 2020-01-01 RX ADMIN — BUMETANIDE 1 MG: 0.25 INJECTION INTRAMUSCULAR; INTRAVENOUS at 17:22

## 2020-01-01 RX ADMIN — BUSPIRONE HYDROCHLORIDE 10 MG: 10 TABLET ORAL at 08:42

## 2020-01-01 RX ADMIN — NYSTATIN: 100000 CREAM TOPICAL at 17:42

## 2020-01-01 RX ADMIN — WARFARIN SODIUM 3 MG: 2 TABLET ORAL at 17:48

## 2020-01-01 RX ADMIN — METOPROLOL TARTRATE 25 MG: 25 TABLET, FILM COATED ORAL at 08:50

## 2020-01-01 RX ADMIN — HYDRALAZINE HYDROCHLORIDE 100 MG: 50 TABLET, FILM COATED ORAL at 15:30

## 2020-01-01 RX ADMIN — Medication 10 ML: at 19:17

## 2020-01-01 RX ADMIN — SERTRALINE HYDROCHLORIDE 75 MG: 50 TABLET ORAL at 08:14

## 2020-01-01 RX ADMIN — HYDRALAZINE HYDROCHLORIDE 100 MG: 50 TABLET, FILM COATED ORAL at 08:09

## 2020-01-01 RX ADMIN — NYSTATIN: 100000 CREAM TOPICAL at 09:22

## 2020-01-01 RX ADMIN — BUMETANIDE 1 MG: 0.25 INJECTION INTRAMUSCULAR; INTRAVENOUS at 17:39

## 2020-01-01 RX ADMIN — ROCURONIUM BROMIDE 5 MG: 10 SOLUTION INTRAVENOUS at 12:59

## 2020-01-01 RX ADMIN — BUSPIRONE HYDROCHLORIDE 10 MG: 10 TABLET ORAL at 17:11

## 2020-01-01 RX ADMIN — VANCOMYCIN HYDROCHLORIDE 1000 MG: 1 INJECTION, POWDER, LYOPHILIZED, FOR SOLUTION INTRAVENOUS at 01:35

## 2020-01-01 RX ADMIN — NYSTATIN: 100000 CREAM TOPICAL at 08:59

## 2020-01-01 RX ADMIN — OXYCODONE HYDROCHLORIDE AND ACETAMINOPHEN 1 TABLET: 5; 325 TABLET ORAL at 03:47

## 2020-01-01 RX ADMIN — Medication 10 ML: at 14:11

## 2020-01-01 RX ADMIN — INSULIN GLARGINE 10 UNITS: 100 INJECTION, SOLUTION SUBCUTANEOUS at 09:07

## 2020-01-01 RX ADMIN — MEROPENEM 500 MG: 500 INJECTION, POWDER, FOR SOLUTION INTRAVENOUS at 08:24

## 2020-01-01 RX ADMIN — FENTANYL CITRATE 50 MCG: 50 INJECTION INTRAMUSCULAR; INTRAVENOUS at 14:00

## 2020-01-01 RX ADMIN — FENTANYL CITRATE 50 MCG: 50 INJECTION, SOLUTION INTRAMUSCULAR; INTRAVENOUS at 10:15

## 2020-01-01 RX ADMIN — BUSPIRONE HYDROCHLORIDE 10 MG: 10 TABLET ORAL at 17:19

## 2020-01-01 RX ADMIN — DAPTOMYCIN 900 MG: 500 INJECTION, POWDER, LYOPHILIZED, FOR SOLUTION INTRAVENOUS at 11:31

## 2020-01-01 RX ADMIN — HYDRALAZINE HYDROCHLORIDE 100 MG: 50 TABLET, FILM COATED ORAL at 22:33

## 2020-01-01 RX ADMIN — DILTIAZEM HYDROCHLORIDE 180 MG: 180 CAPSULE, COATED, EXTENDED RELEASE ORAL at 08:42

## 2020-01-01 RX ADMIN — DILTIAZEM HYDROCHLORIDE 10 MG: 5 INJECTION INTRAVENOUS at 18:42

## 2020-01-01 RX ADMIN — HYDROMORPHONE HYDROCHLORIDE 3 MG: 2 TABLET ORAL at 22:24

## 2020-01-01 RX ADMIN — Medication 10 ML: at 22:51

## 2020-01-01 RX ADMIN — GABAPENTIN 100 MG: 100 CAPSULE ORAL at 09:46

## 2020-01-01 RX ADMIN — HYDRALAZINE HYDROCHLORIDE 100 MG: 50 TABLET, FILM COATED ORAL at 08:52

## 2020-01-01 RX ADMIN — FLUCONAZOLE 200 MG: 100 TABLET ORAL at 08:45

## 2020-01-01 RX ADMIN — Medication 10 ML: at 22:19

## 2020-01-01 RX ADMIN — Medication 10 ML: at 07:26

## 2020-01-01 RX ADMIN — DILTIAZEM HYDROCHLORIDE 180 MG: 180 CAPSULE, COATED, EXTENDED RELEASE ORAL at 09:07

## 2020-01-01 RX ADMIN — BUSPIRONE HYDROCHLORIDE 10 MG: 10 TABLET ORAL at 09:14

## 2020-01-01 RX ADMIN — BUSPIRONE HYDROCHLORIDE 10 MG: 10 TABLET ORAL at 18:12

## 2020-01-01 RX ADMIN — VANCOMYCIN HYDROCHLORIDE 1250 MG: 10 INJECTION, POWDER, LYOPHILIZED, FOR SOLUTION INTRAVENOUS at 00:34

## 2020-01-01 RX ADMIN — HUMAN INSULIN 2 UNITS: 100 INJECTION, SOLUTION SUBCUTANEOUS at 22:13

## 2020-01-01 RX ADMIN — HYDRALAZINE HYDROCHLORIDE 100 MG: 50 TABLET, FILM COATED ORAL at 22:20

## 2020-01-01 RX ADMIN — LEVOFLOXACIN 750 MG: 750 TABLET, FILM COATED ORAL at 16:32

## 2020-01-01 RX ADMIN — Medication 5 ML: at 23:44

## 2020-01-01 RX ADMIN — OXYCODONE HYDROCHLORIDE AND ACETAMINOPHEN 1 TABLET: 5; 325 TABLET ORAL at 03:53

## 2020-01-01 RX ADMIN — BUMETANIDE 1 MG: 1 TABLET ORAL at 17:05

## 2020-01-01 RX ADMIN — SODIUM CHLORIDE 500 ML: 900 INJECTION, SOLUTION INTRAVENOUS at 16:20

## 2020-01-01 RX ADMIN — CARVEDILOL 12.5 MG: 12.5 TABLET, FILM COATED ORAL at 08:22

## 2020-01-01 RX ADMIN — PANTOPRAZOLE SODIUM 40 MG: 40 TABLET, DELAYED RELEASE ORAL at 06:39

## 2020-01-01 RX ADMIN — PROPOFOL 100 MG: 10 INJECTION, EMULSION INTRAVENOUS at 07:39

## 2020-01-01 RX ADMIN — DILTIAZEM HYDROCHLORIDE 180 MG: 180 CAPSULE, COATED, EXTENDED RELEASE ORAL at 08:29

## 2020-01-01 RX ADMIN — HYDROMORPHONE HYDROCHLORIDE 1 MG: 1 INJECTION, SOLUTION INTRAMUSCULAR; INTRAVENOUS; SUBCUTANEOUS at 03:56

## 2020-01-01 RX ADMIN — HYDRALAZINE HYDROCHLORIDE 100 MG: 50 TABLET, FILM COATED ORAL at 05:29

## 2020-01-01 RX ADMIN — ACETAMINOPHEN 650 MG: 325 TABLET ORAL at 00:02

## 2020-01-01 RX ADMIN — Medication 10 ML: at 21:40

## 2020-01-01 RX ADMIN — HEPARIN SODIUM 4000 UNITS: 5000 INJECTION INTRAVENOUS; SUBCUTANEOUS at 05:55

## 2020-01-01 RX ADMIN — Medication 10 ML: at 13:20

## 2020-01-01 RX ADMIN — HYDROMORPHONE HYDROCHLORIDE 0.5 MG: 1 INJECTION, SOLUTION INTRAMUSCULAR; INTRAVENOUS; SUBCUTANEOUS at 09:41

## 2020-01-01 RX ADMIN — BISACODYL 10 MG: 5 TABLET, COATED ORAL at 08:47

## 2020-01-01 RX ADMIN — OXYCODONE HYDROCHLORIDE AND ACETAMINOPHEN 2 TABLET: 5; 325 TABLET ORAL at 21:39

## 2020-01-01 RX ADMIN — HYDRALAZINE HYDROCHLORIDE 50 MG: 50 TABLET, FILM COATED ORAL at 17:05

## 2020-01-01 RX ADMIN — HYDROMORPHONE HYDROCHLORIDE 0.5 MG: 1 INJECTION, SOLUTION INTRAMUSCULAR; INTRAVENOUS; SUBCUTANEOUS at 20:33

## 2020-01-01 RX ADMIN — DILTIAZEM HYDROCHLORIDE 180 MG: 180 CAPSULE, COATED, EXTENDED RELEASE ORAL at 11:08

## 2020-01-01 RX ADMIN — HYDRALAZINE HYDROCHLORIDE 100 MG: 50 TABLET, FILM COATED ORAL at 18:09

## 2020-01-01 RX ADMIN — FENTANYL CITRATE 25 MCG: 50 INJECTION INTRAMUSCULAR; INTRAVENOUS at 12:43

## 2020-01-01 RX ADMIN — FENTANYL CITRATE 25 MCG: 50 INJECTION INTRAMUSCULAR; INTRAVENOUS at 12:45

## 2020-01-01 RX ADMIN — FENTANYL CITRATE 25 MCG: 50 INJECTION, SOLUTION INTRAMUSCULAR; INTRAVENOUS at 17:07

## 2020-01-01 RX ADMIN — WARFARIN SODIUM 2.5 MG: 2.5 TABLET ORAL at 17:04

## 2020-01-01 RX ADMIN — Medication 10 ML: at 21:38

## 2020-01-01 RX ADMIN — HYDROMORPHONE HYDROCHLORIDE 0.5 MG: 1 INJECTION, SOLUTION INTRAMUSCULAR; INTRAVENOUS; SUBCUTANEOUS at 11:31

## 2020-01-01 RX ADMIN — HYDROMORPHONE HYDROCHLORIDE 1 MG: 1 INJECTION, SOLUTION INTRAMUSCULAR; INTRAVENOUS; SUBCUTANEOUS at 23:20

## 2020-01-01 RX ADMIN — BUSPIRONE HYDROCHLORIDE 10 MG: 10 TABLET ORAL at 18:28

## 2020-01-01 RX ADMIN — FENTANYL CITRATE 25 MCG: 50 INJECTION INTRAMUSCULAR; INTRAVENOUS at 09:20

## 2020-01-01 RX ADMIN — POTASSIUM CHLORIDE 20 MEQ: 750 TABLET, FILM COATED, EXTENDED RELEASE ORAL at 10:44

## 2020-01-01 RX ADMIN — HYDROMORPHONE HYDROCHLORIDE 0.5 MG: 1 INJECTION, SOLUTION INTRAMUSCULAR; INTRAVENOUS; SUBCUTANEOUS at 10:31

## 2020-01-01 RX ADMIN — POTASSIUM CHLORIDE 20 MEQ: 750 TABLET, FILM COATED, EXTENDED RELEASE ORAL at 09:38

## 2020-01-01 RX ADMIN — SODIUM CHLORIDE, POTASSIUM CHLORIDE, SODIUM LACTATE AND CALCIUM CHLORIDE: 600; 310; 30; 20 INJECTION, SOLUTION INTRAVENOUS at 12:45

## 2020-01-01 RX ADMIN — COLCHICINE 0.6 MG: 0.6 TABLET, FILM COATED ORAL at 09:43

## 2020-01-01 RX ADMIN — HYDROMORPHONE HYDROCHLORIDE 1 MG: 1 INJECTION, SOLUTION INTRAMUSCULAR; INTRAVENOUS; SUBCUTANEOUS at 16:55

## 2020-01-01 RX ADMIN — HYDRALAZINE HYDROCHLORIDE 100 MG: 50 TABLET, FILM COATED ORAL at 21:58

## 2020-01-01 RX ADMIN — SERTRALINE HYDROCHLORIDE 75 MG: 50 TABLET ORAL at 09:38

## 2020-01-01 RX ADMIN — FENTANYL CITRATE 25 MCG: 50 INJECTION, SOLUTION INTRAMUSCULAR; INTRAVENOUS at 16:21

## 2020-01-01 RX ADMIN — Medication 20 ML: at 22:21

## 2020-01-01 RX ADMIN — SERTRALINE HYDROCHLORIDE 50 MG: 50 TABLET ORAL at 08:35

## 2020-01-01 RX ADMIN — Medication 10 ML: at 21:47

## 2020-01-01 RX ADMIN — SODIUM CHLORIDE 2.5 MG/HR: 900 INJECTION, SOLUTION INTRAVENOUS at 18:43

## 2020-01-01 RX ADMIN — METOPROLOL SUCCINATE 50 MG: 50 TABLET, EXTENDED RELEASE ORAL at 10:56

## 2020-01-01 RX ADMIN — Medication 10 ML: at 14:33

## 2020-01-01 RX ADMIN — PANTOPRAZOLE SODIUM 40 MG: 40 TABLET, DELAYED RELEASE ORAL at 15:34

## 2020-01-01 RX ADMIN — HYDROMORPHONE HYDROCHLORIDE 2 MG: 2 TABLET ORAL at 16:15

## 2020-01-01 RX ADMIN — HYDRALAZINE HYDROCHLORIDE 100 MG: 50 TABLET, FILM COATED ORAL at 15:39

## 2020-01-01 RX ADMIN — HYDRALAZINE HYDROCHLORIDE 100 MG: 50 TABLET, FILM COATED ORAL at 21:49

## 2020-01-01 RX ADMIN — POTASSIUM CHLORIDE 20 MEQ: 750 TABLET, FILM COATED, EXTENDED RELEASE ORAL at 09:21

## 2020-01-01 RX ADMIN — METOPROLOL TARTRATE 5 MG: 5 INJECTION INTRAVENOUS at 10:58

## 2020-01-01 RX ADMIN — SODIUM CHLORIDE 10 MG/HR: 900 INJECTION, SOLUTION INTRAVENOUS at 22:09

## 2020-01-01 RX ADMIN — Medication 10 ML: at 22:35

## 2020-01-01 RX ADMIN — INSULIN GLARGINE 10 UNITS: 100 INJECTION, SOLUTION SUBCUTANEOUS at 22:04

## 2020-01-01 RX ADMIN — Medication 10 ML: at 15:05

## 2020-01-01 RX ADMIN — SERTRALINE HYDROCHLORIDE 75 MG: 50 TABLET ORAL at 08:52

## 2020-01-01 RX ADMIN — GABAPENTIN 100 MG: 100 CAPSULE ORAL at 16:17

## 2020-01-01 RX ADMIN — SUCRALFATE 1 G: 1 TABLET ORAL at 09:04

## 2020-01-01 RX ADMIN — FENTANYL CITRATE 25 MCG: 50 INJECTION INTRAMUSCULAR; INTRAVENOUS at 05:18

## 2020-01-01 RX ADMIN — SUCRALFATE 1 G: 1 TABLET ORAL at 06:32

## 2020-01-01 RX ADMIN — INSULIN LISPRO 2 UNITS: 100 INJECTION, SOLUTION INTRAVENOUS; SUBCUTANEOUS at 19:00

## 2020-01-01 RX ADMIN — HYDRALAZINE HYDROCHLORIDE 50 MG: 50 TABLET, FILM COATED ORAL at 15:31

## 2020-01-01 RX ADMIN — BUSPIRONE HYDROCHLORIDE 10 MG: 10 TABLET ORAL at 08:07

## 2020-01-01 RX ADMIN — Medication 10 ML: at 03:50

## 2020-01-01 RX ADMIN — ONDANSETRON 4 MG: 2 INJECTION INTRAMUSCULAR; INTRAVENOUS at 16:15

## 2020-01-01 RX ADMIN — Medication 10 ML: at 08:17

## 2020-01-01 RX ADMIN — POTASSIUM CHLORIDE 30 MEQ: 750 TABLET, FILM COATED, EXTENDED RELEASE ORAL at 09:32

## 2020-01-01 RX ADMIN — TRAMADOL HYDROCHLORIDE 50 MG: 50 TABLET, FILM COATED ORAL at 00:35

## 2020-01-01 RX ADMIN — BUSPIRONE HYDROCHLORIDE 10 MG: 10 TABLET ORAL at 09:45

## 2020-01-01 RX ADMIN — SODIUM CHLORIDE: 900 INJECTION, SOLUTION INTRAVENOUS at 15:17

## 2020-01-01 RX ADMIN — AZITHROMYCIN MONOHYDRATE 500 MG: 500 INJECTION, POWDER, LYOPHILIZED, FOR SOLUTION INTRAVENOUS at 00:15

## 2020-01-01 RX ADMIN — LEVOFLOXACIN 750 MG: 5 INJECTION, SOLUTION INTRAVENOUS at 01:58

## 2020-01-01 RX ADMIN — Medication 10 ML: at 21:46

## 2020-01-01 RX ADMIN — ONDANSETRON 4 MG: 2 INJECTION INTRAMUSCULAR; INTRAVENOUS at 22:48

## 2020-01-01 RX ADMIN — HYDRALAZINE HYDROCHLORIDE 100 MG: 50 TABLET, FILM COATED ORAL at 22:09

## 2020-01-01 RX ADMIN — METOPROLOL TARTRATE 25 MG: 25 TABLET, FILM COATED ORAL at 19:07

## 2020-01-01 RX ADMIN — HUMAN INSULIN 2 UNITS: 100 INJECTION, SOLUTION SUBCUTANEOUS at 11:56

## 2020-01-01 RX ADMIN — FENTANYL CITRATE 25 MCG: 50 INJECTION, SOLUTION INTRAMUSCULAR; INTRAVENOUS at 12:14

## 2020-01-01 RX ADMIN — INSULIN GLARGINE 10 UNITS: 100 INJECTION, SOLUTION SUBCUTANEOUS at 09:14

## 2020-01-01 RX ADMIN — HYDROMORPHONE HYDROCHLORIDE 0.5 MG: 1 INJECTION, SOLUTION INTRAMUSCULAR; INTRAVENOUS; SUBCUTANEOUS at 13:54

## 2020-01-01 RX ADMIN — BUSPIRONE HYDROCHLORIDE 10 MG: 10 TABLET ORAL at 09:03

## 2020-01-01 RX ADMIN — INSULIN GLARGINE 10 UNITS: 100 INJECTION, SOLUTION SUBCUTANEOUS at 09:06

## 2020-01-01 RX ADMIN — METOPROLOL SUCCINATE 200 MG: 50 TABLET, EXTENDED RELEASE ORAL at 08:54

## 2020-01-01 RX ADMIN — SODIUM CHLORIDE 10 MG: 9 INJECTION INTRAMUSCULAR; INTRAVENOUS; SUBCUTANEOUS at 23:37

## 2020-01-01 RX ADMIN — BUSPIRONE HYDROCHLORIDE 10 MG: 10 TABLET ORAL at 17:01

## 2020-01-01 RX ADMIN — ONDANSETRON 4 MG: 2 INJECTION INTRAMUSCULAR; INTRAVENOUS at 10:26

## 2020-01-01 RX ADMIN — SERTRALINE HYDROCHLORIDE 50 MG: 50 TABLET ORAL at 15:28

## 2020-01-01 RX ADMIN — Medication 10 ML: at 16:13

## 2020-01-01 RX ADMIN — PROPOFOL 20 MG: 10 INJECTION, EMULSION INTRAVENOUS at 11:56

## 2020-01-01 RX ADMIN — BUSPIRONE HYDROCHLORIDE 10 MG: 10 TABLET ORAL at 17:03

## 2020-01-01 RX ADMIN — VANCOMYCIN HYDROCHLORIDE 1000 MG: 1 INJECTION, POWDER, LYOPHILIZED, FOR SOLUTION INTRAVENOUS at 02:12

## 2020-01-01 RX ADMIN — BUMETANIDE 1 MG: 0.25 INJECTION INTRAMUSCULAR; INTRAVENOUS at 17:03

## 2020-01-01 RX ADMIN — Medication 10 ML: at 05:12

## 2020-01-01 RX ADMIN — LIDOCAINE HYDROCHLORIDE 50 MG: 20 INJECTION, SOLUTION EPIDURAL; INFILTRATION; INTRACAUDAL; PERINEURAL at 14:27

## 2020-01-01 RX ADMIN — Medication 10 ML: at 05:21

## 2020-01-01 RX ADMIN — MEROPENEM 500 MG: 500 INJECTION, POWDER, FOR SOLUTION INTRAVENOUS at 22:12

## 2020-01-01 RX ADMIN — Medication 10 ML: at 03:45

## 2020-01-01 RX ADMIN — HYDROMORPHONE HYDROCHLORIDE 0.5 MG: 1 INJECTION, SOLUTION INTRAMUSCULAR; INTRAVENOUS; SUBCUTANEOUS at 10:41

## 2020-01-01 RX ADMIN — FENTANYL CITRATE 50 MCG: 50 INJECTION, SOLUTION INTRAMUSCULAR; INTRAVENOUS at 10:21

## 2020-01-01 RX ADMIN — GABAPENTIN 100 MG: 100 CAPSULE ORAL at 08:44

## 2020-01-01 RX ADMIN — PROPOFOL 125 MG: 10 INJECTION, EMULSION INTRAVENOUS at 09:03

## 2020-01-01 RX ADMIN — HEPARIN SODIUM 16 UNITS/KG/HR: 10000 INJECTION, SOLUTION INTRAVENOUS at 21:21

## 2020-01-01 RX ADMIN — Medication 1 AMPULE: at 21:00

## 2020-01-01 RX ADMIN — DOXAZOSIN 4 MG: 2 TABLET ORAL at 21:22

## 2020-01-01 RX ADMIN — HYDRALAZINE HYDROCHLORIDE 50 MG: 50 TABLET, FILM COATED ORAL at 10:54

## 2020-01-01 RX ADMIN — ONDANSETRON 4 MG: 2 INJECTION INTRAMUSCULAR; INTRAVENOUS at 05:09

## 2020-01-01 RX ADMIN — GABAPENTIN 100 MG: 100 CAPSULE ORAL at 21:00

## 2020-01-01 RX ADMIN — HYDROMORPHONE HYDROCHLORIDE 0.5 MG: 1 INJECTION, SOLUTION INTRAMUSCULAR; INTRAVENOUS; SUBCUTANEOUS at 11:43

## 2020-01-01 RX ADMIN — DILTIAZEM HYDROCHLORIDE 180 MG: 180 CAPSULE, COATED, EXTENDED RELEASE ORAL at 09:53

## 2020-01-01 RX ADMIN — GLYCOPYRROLATE 0.4 MG: 0.2 INJECTION, SOLUTION INTRAMUSCULAR; INTRAVENOUS at 08:33

## 2020-01-01 RX ADMIN — TRAMADOL HYDROCHLORIDE 50 MG: 50 TABLET, FILM COATED ORAL at 18:12

## 2020-01-01 RX ADMIN — NYSTATIN: 100000 CREAM TOPICAL at 09:11

## 2020-01-01 RX ADMIN — HYDROMORPHONE HYDROCHLORIDE 3 MG: 2 TABLET ORAL at 20:44

## 2020-01-01 RX ADMIN — BUMETANIDE 2 MG: 1 TABLET ORAL at 08:05

## 2020-01-01 RX ADMIN — MIDAZOLAM 2 MG: 1 INJECTION INTRAMUSCULAR; INTRAVENOUS at 08:51

## 2020-01-01 RX ADMIN — HYDROMORPHONE HYDROCHLORIDE 3 MG: 2 TABLET ORAL at 14:29

## 2020-01-01 RX ADMIN — HYDRALAZINE HYDROCHLORIDE 100 MG: 50 TABLET, FILM COATED ORAL at 16:16

## 2020-01-01 RX ADMIN — BUMETANIDE 1 MG: 1 TABLET ORAL at 08:25

## 2020-01-01 RX ADMIN — METOPROLOL TARTRATE 25 MG: 25 TABLET, FILM COATED ORAL at 17:21

## 2020-01-01 RX ADMIN — BUMETANIDE 2 MG: 1 TABLET ORAL at 10:53

## 2020-01-01 RX ADMIN — INSULIN GLARGINE 10 UNITS: 100 INJECTION, SOLUTION SUBCUTANEOUS at 08:06

## 2020-01-01 RX ADMIN — Medication 10 ML: at 07:06

## 2020-01-01 RX ADMIN — INSULIN GLARGINE 10 UNITS: 100 INJECTION, SOLUTION SUBCUTANEOUS at 10:52

## 2020-01-01 RX ADMIN — BUMETANIDE 0.5 MG: 1 TABLET ORAL at 15:04

## 2020-01-01 RX ADMIN — SERTRALINE HYDROCHLORIDE 75 MG: 50 TABLET ORAL at 09:09

## 2020-01-01 RX ADMIN — HYDRALAZINE HYDROCHLORIDE 50 MG: 50 TABLET, FILM COATED ORAL at 09:14

## 2020-01-01 RX ADMIN — OXYCODONE HYDROCHLORIDE AND ACETAMINOPHEN 1 TABLET: 5; 325 TABLET ORAL at 12:59

## 2020-01-01 RX ADMIN — HYDROMORPHONE HYDROCHLORIDE 0.5 MG: 2 INJECTION, SOLUTION INTRAMUSCULAR; INTRAVENOUS; SUBCUTANEOUS at 08:45

## 2020-01-01 RX ADMIN — HYDRALAZINE HYDROCHLORIDE 100 MG: 50 TABLET, FILM COATED ORAL at 16:15

## 2020-01-01 RX ADMIN — HYDRALAZINE HYDROCHLORIDE 100 MG: 50 TABLET, FILM COATED ORAL at 09:23

## 2020-01-01 RX ADMIN — DAPTOMYCIN 900 MG: 500 INJECTION, POWDER, LYOPHILIZED, FOR SOLUTION INTRAVENOUS at 11:48

## 2020-01-01 RX ADMIN — WARFARIN SODIUM 5 MG: 5 TABLET ORAL at 18:10

## 2020-01-01 RX ADMIN — POTASSIUM CHLORIDE 20 MEQ: 750 TABLET, FILM COATED, EXTENDED RELEASE ORAL at 08:14

## 2020-01-01 RX ADMIN — INSULIN GLARGINE 10 UNITS: 100 INJECTION, SOLUTION SUBCUTANEOUS at 22:30

## 2020-01-01 RX ADMIN — VANCOMYCIN HYDROCHLORIDE 1250 MG: 10 INJECTION, POWDER, LYOPHILIZED, FOR SOLUTION INTRAVENOUS at 23:28

## 2020-01-01 RX ADMIN — BUMETANIDE 0.5 MG: 1 TABLET ORAL at 15:43

## 2020-01-01 RX ADMIN — BUSPIRONE HYDROCHLORIDE 10 MG: 10 TABLET ORAL at 18:48

## 2020-01-01 RX ADMIN — OXYCODONE HYDROCHLORIDE AND ACETAMINOPHEN 1 TABLET: 5; 325 TABLET ORAL at 00:32

## 2020-01-01 RX ADMIN — Medication 10 ML: at 21:00

## 2020-01-01 RX ADMIN — SUCRALFATE 1 G: 1 TABLET ORAL at 22:34

## 2020-01-01 RX ADMIN — BUSPIRONE HYDROCHLORIDE 10 MG: 10 TABLET ORAL at 17:43

## 2020-01-01 RX ADMIN — HYDRALAZINE HYDROCHLORIDE 100 MG: 50 TABLET, FILM COATED ORAL at 11:08

## 2020-01-01 RX ADMIN — SODIUM CHLORIDE 1000 ML: 900 INJECTION, SOLUTION INTRAVENOUS at 13:38

## 2020-01-01 RX ADMIN — BUSPIRONE HYDROCHLORIDE 10 MG: 10 TABLET ORAL at 08:45

## 2020-01-01 RX ADMIN — HYDROMORPHONE HYDROCHLORIDE 1 MG: 2 TABLET ORAL at 21:44

## 2020-01-01 RX ADMIN — WARFARIN SODIUM 1 MG: 1 TABLET ORAL at 17:43

## 2020-01-01 RX ADMIN — SERTRALINE HYDROCHLORIDE 50 MG: 50 TABLET ORAL at 09:03

## 2020-01-01 RX ADMIN — DILTIAZEM HYDROCHLORIDE 180 MG: 180 CAPSULE, COATED, EXTENDED RELEASE ORAL at 08:11

## 2020-01-01 RX ADMIN — BUSPIRONE HYDROCHLORIDE 10 MG: 10 TABLET ORAL at 17:26

## 2020-01-01 RX ADMIN — VANCOMYCIN HYDROCHLORIDE 1250 MG: 10 INJECTION, POWDER, LYOPHILIZED, FOR SOLUTION INTRAVENOUS at 20:11

## 2020-01-01 RX ADMIN — FENTANYL CITRATE 50 MCG: 50 INJECTION INTRAMUSCULAR; INTRAVENOUS at 01:12

## 2020-01-01 RX ADMIN — KETOROLAC TROMETHAMINE 30 MG: 30 INJECTION, SOLUTION INTRAMUSCULAR at 23:04

## 2020-01-01 RX ADMIN — Medication 10 ML: at 06:54

## 2020-01-01 RX ADMIN — Medication 80 MCG: at 08:17

## 2020-01-01 RX ADMIN — VANCOMYCIN HYDROCHLORIDE 2000 MG: 10 INJECTION, POWDER, LYOPHILIZED, FOR SOLUTION INTRAVENOUS at 08:36

## 2020-01-01 RX ADMIN — Medication 1 AMPULE: at 08:16

## 2020-01-01 RX ADMIN — DILTIAZEM HYDROCHLORIDE 180 MG: 180 CAPSULE, COATED, EXTENDED RELEASE ORAL at 15:28

## 2020-01-01 RX ADMIN — MIDAZOLAM 2 MG: 1 INJECTION INTRAMUSCULAR; INTRAVENOUS at 12:08

## 2020-01-01 RX ADMIN — PROPOFOL 50 MG: 10 INJECTION, EMULSION INTRAVENOUS at 08:29

## 2020-01-01 RX ADMIN — HYDRALAZINE HYDROCHLORIDE 50 MG: 50 TABLET, FILM COATED ORAL at 21:15

## 2020-01-01 RX ADMIN — METOPROLOL SUCCINATE 200 MG: 50 TABLET, EXTENDED RELEASE ORAL at 15:28

## 2020-01-01 RX ADMIN — ACETAMINOPHEN 650 MG: 325 TABLET ORAL at 18:26

## 2020-01-01 RX ADMIN — NYSTATIN: 100000 CREAM TOPICAL at 18:00

## 2020-01-01 RX ADMIN — HYDROMORPHONE HYDROCHLORIDE 0.5 MG: 1 INJECTION, SOLUTION INTRAMUSCULAR; INTRAVENOUS; SUBCUTANEOUS at 03:11

## 2020-01-01 RX ADMIN — HEPARIN SODIUM 15 UNITS/KG/HR: 10000 INJECTION, SOLUTION INTRAVENOUS at 15:21

## 2020-01-01 RX ADMIN — BUMETANIDE 1 MG: 1 TABLET ORAL at 17:17

## 2020-01-01 RX ADMIN — SERTRALINE HYDROCHLORIDE 50 MG: 50 TABLET ORAL at 08:44

## 2020-01-01 RX ADMIN — SUCRALFATE 1 G: 1 TABLET ORAL at 06:37

## 2020-01-01 RX ADMIN — METOPROLOL TARTRATE 25 MG: 25 TABLET, FILM COATED ORAL at 17:03

## 2020-01-01 RX ADMIN — BUSPIRONE HYDROCHLORIDE 10 MG: 10 TABLET ORAL at 18:15

## 2020-01-01 RX ADMIN — FLUCONAZOLE 200 MG: 100 TABLET ORAL at 08:09

## 2020-01-01 RX ADMIN — HYDRALAZINE HYDROCHLORIDE 100 MG: 50 TABLET, FILM COATED ORAL at 09:04

## 2020-01-01 RX ADMIN — FENTANYL CITRATE 25 MCG: 50 INJECTION INTRAMUSCULAR; INTRAVENOUS at 00:38

## 2020-01-01 RX ADMIN — TRAMADOL HYDROCHLORIDE 50 MG: 50 TABLET, FILM COATED ORAL at 17:31

## 2020-01-01 RX ADMIN — HYDRALAZINE HYDROCHLORIDE 100 MG: 50 TABLET, FILM COATED ORAL at 22:44

## 2020-01-01 RX ADMIN — CLINDAMYCIN PHOSPHATE 600 MG: 600 INJECTION, SOLUTION INTRAVENOUS at 16:31

## 2020-01-01 RX ADMIN — METOPROLOL TARTRATE 25 MG: 25 TABLET, FILM COATED ORAL at 17:17

## 2020-01-01 RX ADMIN — DILTIAZEM HYDROCHLORIDE 180 MG: 180 CAPSULE, COATED, EXTENDED RELEASE ORAL at 09:10

## 2020-01-01 RX ADMIN — Medication 10 ML: at 10:31

## 2020-01-01 RX ADMIN — HEPARIN SODIUM 8400 UNITS: 5000 INJECTION INTRAVENOUS; SUBCUTANEOUS at 17:40

## 2020-01-01 RX ADMIN — BUSPIRONE HYDROCHLORIDE 10 MG: 10 TABLET ORAL at 18:32

## 2020-01-01 RX ADMIN — PANTOPRAZOLE SODIUM 40 MG: 40 TABLET, DELAYED RELEASE ORAL at 17:05

## 2020-01-01 RX ADMIN — ACETAMINOPHEN 650 MG: 325 TABLET, FILM COATED ORAL at 07:37

## 2020-01-01 RX ADMIN — PROPOFOL 25 MG: 10 INJECTION, EMULSION INTRAVENOUS at 08:31

## 2020-01-01 RX ADMIN — NYSTATIN: 100000 CREAM TOPICAL at 17:12

## 2020-01-01 RX ADMIN — HYDRALAZINE HYDROCHLORIDE 100 MG: 50 TABLET, FILM COATED ORAL at 22:28

## 2020-01-01 RX ADMIN — EPOETIN ALFA-EPBX 20000 UNITS: 10000 INJECTION, SOLUTION INTRAVENOUS; SUBCUTANEOUS at 00:09

## 2020-01-01 RX ADMIN — BUMETANIDE 0.5 MG: 1 TABLET ORAL at 09:54

## 2020-01-01 RX ADMIN — HYDRALAZINE HYDROCHLORIDE 100 MG: 50 TABLET, FILM COATED ORAL at 08:44

## 2020-01-01 RX ADMIN — HYDRALAZINE HYDROCHLORIDE 50 MG: 50 TABLET, FILM COATED ORAL at 09:07

## 2020-01-01 RX ADMIN — HYDRALAZINE HYDROCHLORIDE 50 MG: 50 TABLET, FILM COATED ORAL at 00:38

## 2020-01-01 RX ADMIN — DIPHENHYDRAMINE HYDROCHLORIDE 25 MG: 25 CAPSULE ORAL at 22:06

## 2020-01-01 RX ADMIN — PROPOFOL 130 MG: 10 INJECTION, EMULSION INTRAVENOUS at 14:27

## 2020-01-01 RX ADMIN — HYDROMORPHONE HYDROCHLORIDE 0.5 MG: 1 INJECTION, SOLUTION INTRAMUSCULAR; INTRAVENOUS; SUBCUTANEOUS at 23:01

## 2020-01-01 RX ADMIN — NYSTATIN: 100000 CREAM TOPICAL at 18:11

## 2020-01-01 RX ADMIN — HYDRALAZINE HYDROCHLORIDE 50 MG: 50 TABLET, FILM COATED ORAL at 09:10

## 2020-01-01 RX ADMIN — HYDRALAZINE HYDROCHLORIDE 100 MG: 50 TABLET, FILM COATED ORAL at 08:57

## 2020-01-01 RX ADMIN — HYDRALAZINE HYDROCHLORIDE 100 MG: 50 TABLET, FILM COATED ORAL at 10:46

## 2020-01-01 RX ADMIN — ACETAMINOPHEN 650 MG: 325 TABLET ORAL at 22:05

## 2020-01-01 RX ADMIN — HYDRALAZINE HYDROCHLORIDE 100 MG: 50 TABLET, FILM COATED ORAL at 17:21

## 2020-01-01 RX ADMIN — VANCOMYCIN HYDROCHLORIDE 1250 MG: 10 INJECTION, POWDER, LYOPHILIZED, FOR SOLUTION INTRAVENOUS at 10:12

## 2020-01-01 RX ADMIN — Medication 10 ML: at 22:59

## 2020-01-01 RX ADMIN — Medication 10 ML: at 13:10

## 2020-01-01 RX ADMIN — METOPROLOL TARTRATE 25 MG: 25 TABLET, FILM COATED ORAL at 17:39

## 2020-01-01 RX ADMIN — PHENYLEPHRINE HYDROCHLORIDE 40 MCG/MIN: 10 INJECTION INTRAVENOUS at 13:15

## 2020-01-01 RX ADMIN — HYDROMORPHONE HYDROCHLORIDE 0.5 MG: 1 INJECTION, SOLUTION INTRAMUSCULAR; INTRAVENOUS; SUBCUTANEOUS at 22:19

## 2020-01-01 RX ADMIN — ONDANSETRON 4 MG: 2 INJECTION INTRAMUSCULAR; INTRAVENOUS at 08:47

## 2020-01-01 RX ADMIN — HYDRALAZINE HYDROCHLORIDE 100 MG: 50 TABLET, FILM COATED ORAL at 21:34

## 2020-01-01 RX ADMIN — Medication 10 ML: at 16:20

## 2020-01-01 RX ADMIN — POTASSIUM CHLORIDE 10 MEQ: 14.9 INJECTION, SOLUTION INTRAVENOUS at 14:33

## 2020-01-01 RX ADMIN — HYDRALAZINE HYDROCHLORIDE 100 MG: 50 TABLET, FILM COATED ORAL at 15:03

## 2020-01-01 RX ADMIN — SUCRALFATE 1 G: 1 TABLET ORAL at 10:42

## 2020-01-01 RX ADMIN — OXYCODONE HYDROCHLORIDE AND ACETAMINOPHEN 1 TABLET: 5; 325 TABLET ORAL at 08:02

## 2020-01-01 RX ADMIN — ONDANSETRON HYDROCHLORIDE 4 MG: 2 INJECTION, SOLUTION INTRAMUSCULAR; INTRAVENOUS at 13:46

## 2020-01-01 RX ADMIN — SERTRALINE HYDROCHLORIDE 75 MG: 50 TABLET ORAL at 08:28

## 2020-01-01 RX ADMIN — METOPROLOL SUCCINATE 200 MG: 50 TABLET, EXTENDED RELEASE ORAL at 08:05

## 2020-01-01 RX ADMIN — OXYCODONE HYDROCHLORIDE AND ACETAMINOPHEN 1 TABLET: 5; 325 TABLET ORAL at 16:43

## 2020-01-01 RX ADMIN — OXYCODONE HYDROCHLORIDE AND ACETAMINOPHEN 1 TABLET: 5; 325 TABLET ORAL at 03:42

## 2020-01-01 RX ADMIN — MIDAZOLAM 1 MG: 1 INJECTION INTRAMUSCULAR; INTRAVENOUS at 14:11

## 2020-01-01 RX ADMIN — LIDOCAINE HYDROCHLORIDE 100 MG: 20 INJECTION, SOLUTION EPIDURAL; INFILTRATION; INTRACAUDAL; PERINEURAL at 10:21

## 2020-01-01 RX ADMIN — INSULIN GLARGINE 10 UNITS: 100 INJECTION, SOLUTION SUBCUTANEOUS at 22:58

## 2020-01-01 RX ADMIN — BUSPIRONE HYDROCHLORIDE 10 MG: 10 TABLET ORAL at 08:11

## 2020-01-01 RX ADMIN — HYDROMORPHONE HYDROCHLORIDE 1 MG: 1 INJECTION, SOLUTION INTRAMUSCULAR; INTRAVENOUS; SUBCUTANEOUS at 21:43

## 2020-01-01 RX ADMIN — SERTRALINE HYDROCHLORIDE 75 MG: 50 TABLET ORAL at 09:32

## 2020-01-01 RX ADMIN — SERTRALINE HYDROCHLORIDE 125 MG: 50 TABLET ORAL at 10:12

## 2020-01-01 RX ADMIN — BUSPIRONE HYDROCHLORIDE 10 MG: 10 TABLET ORAL at 17:40

## 2020-01-01 RX ADMIN — BUMETANIDE 0.5 MG: 1 TABLET ORAL at 14:27

## 2020-01-01 RX ADMIN — DILTIAZEM HYDROCHLORIDE 180 MG: 180 CAPSULE, COATED, EXTENDED RELEASE ORAL at 11:37

## 2020-01-01 RX ADMIN — WARFARIN SODIUM 3 MG: 2 TABLET ORAL at 17:33

## 2020-01-01 RX ADMIN — Medication 10 ML: at 21:39

## 2020-01-01 RX ADMIN — Medication 10 ML: at 15:15

## 2020-01-01 RX ADMIN — PANTOPRAZOLE SODIUM 40 MG: 40 TABLET, DELAYED RELEASE ORAL at 10:52

## 2020-01-01 RX ADMIN — NYSTATIN: 100000 CREAM TOPICAL at 17:48

## 2020-01-01 RX ADMIN — ONDANSETRON 4 MG: 2 INJECTION INTRAMUSCULAR; INTRAVENOUS at 09:12

## 2020-01-01 RX ADMIN — Medication 1 AMPULE: at 08:50

## 2020-01-01 RX ADMIN — HYDROMORPHONE HYDROCHLORIDE 3 MG: 2 TABLET ORAL at 08:14

## 2020-01-01 RX ADMIN — OXYCODONE HYDROCHLORIDE AND ACETAMINOPHEN 1 TABLET: 5; 325 TABLET ORAL at 12:16

## 2020-01-01 RX ADMIN — HYDRALAZINE HYDROCHLORIDE 100 MG: 50 TABLET, FILM COATED ORAL at 08:58

## 2020-01-01 RX ADMIN — HYDRALAZINE HYDROCHLORIDE 100 MG: 50 TABLET, FILM COATED ORAL at 08:49

## 2020-01-01 RX ADMIN — METOPROLOL TARTRATE 25 MG: 25 TABLET, FILM COATED ORAL at 09:07

## 2020-01-01 RX ADMIN — Medication 10 ML: at 17:13

## 2020-01-01 RX ADMIN — SODIUM CHLORIDE 75 ML/HR: 900 INJECTION, SOLUTION INTRAVENOUS at 18:07

## 2020-01-01 RX ADMIN — BUSPIRONE HYDROCHLORIDE 10 MG: 10 TABLET ORAL at 08:57

## 2020-01-01 RX ADMIN — BUMETANIDE 1 MG: 1 TABLET ORAL at 09:05

## 2020-01-01 RX ADMIN — ONDANSETRON 4 MG: 2 INJECTION INTRAMUSCULAR; INTRAVENOUS at 20:06

## 2020-01-01 RX ADMIN — METOPROLOL SUCCINATE 50 MG: 50 TABLET, EXTENDED RELEASE ORAL at 08:13

## 2020-01-01 RX ADMIN — SODIUM CHLORIDE: 900 INJECTION, SOLUTION INTRAVENOUS at 11:33

## 2020-01-01 RX ADMIN — BUSPIRONE HYDROCHLORIDE 10 MG: 10 TABLET ORAL at 17:12

## 2020-01-01 RX ADMIN — BUMETANIDE 1 MG: 0.25 INJECTION INTRAMUSCULAR; INTRAVENOUS at 17:06

## 2020-01-01 RX ADMIN — HYDROMORPHONE HYDROCHLORIDE 0.5 MG: 1 INJECTION, SOLUTION INTRAMUSCULAR; INTRAVENOUS; SUBCUTANEOUS at 14:23

## 2020-01-01 RX ADMIN — MEROPENEM 500 MG: 500 INJECTION, POWDER, FOR SOLUTION INTRAVENOUS at 15:33

## 2020-01-01 RX ADMIN — Medication: at 12:11

## 2020-01-01 RX ADMIN — ONDANSETRON 4 MG: 2 INJECTION INTRAMUSCULAR; INTRAVENOUS at 22:38

## 2020-01-01 RX ADMIN — Medication 10 ML: at 09:59

## 2020-01-01 RX ADMIN — METOPROLOL TARTRATE 5 MG: 5 INJECTION INTRAVENOUS at 03:36

## 2020-01-01 RX ADMIN — HYDRALAZINE HYDROCHLORIDE 50 MG: 50 TABLET, FILM COATED ORAL at 16:21

## 2020-01-01 RX ADMIN — METOPROLOL TARTRATE 25 MG: 25 TABLET, FILM COATED ORAL at 08:43

## 2020-01-01 RX ADMIN — DOXAZOSIN 4 MG: 2 TABLET ORAL at 22:46

## 2020-01-01 RX ADMIN — METOPROLOL SUCCINATE 200 MG: 50 TABLET, EXTENDED RELEASE ORAL at 09:13

## 2020-01-01 RX ADMIN — HYDRALAZINE HYDROCHLORIDE 50 MG: 50 TABLET, FILM COATED ORAL at 22:24

## 2020-01-01 RX ADMIN — ROCURONIUM BROMIDE 5 MG: 10 SOLUTION INTRAVENOUS at 14:27

## 2020-01-01 RX ADMIN — DILTIAZEM HYDROCHLORIDE 60 MG: 30 TABLET, FILM COATED ORAL at 16:22

## 2020-01-01 RX ADMIN — SERTRALINE HYDROCHLORIDE 125 MG: 50 TABLET ORAL at 09:04

## 2020-01-01 RX ADMIN — SUCRALFATE 1 G: 1 TABLET ORAL at 16:15

## 2020-01-01 RX ADMIN — ONDANSETRON 4 MG: 2 INJECTION INTRAMUSCULAR; INTRAVENOUS at 09:06

## 2020-01-01 RX ADMIN — MIDAZOLAM HYDROCHLORIDE 1 MG: 1 INJECTION, SOLUTION INTRAMUSCULAR; INTRAVENOUS at 17:00

## 2020-01-01 RX ADMIN — INSULIN GLARGINE 10 UNITS: 100 INJECTION, SOLUTION SUBCUTANEOUS at 09:10

## 2020-01-01 RX ADMIN — HYDROMORPHONE HYDROCHLORIDE 1 MG: 2 TABLET ORAL at 05:20

## 2020-01-01 RX ADMIN — PANTOPRAZOLE SODIUM 40 MG: 40 TABLET, DELAYED RELEASE ORAL at 08:44

## 2020-01-01 RX ADMIN — POTASSIUM CHLORIDE 20 MEQ: 750 TABLET, FILM COATED, EXTENDED RELEASE ORAL at 09:07

## 2020-01-01 RX ADMIN — BUSPIRONE HYDROCHLORIDE 10 MG: 10 TABLET ORAL at 08:13

## 2020-01-01 RX ADMIN — WARFARIN SODIUM 3 MG: 2 TABLET ORAL at 18:56

## 2020-01-01 RX ADMIN — DIPHENHYDRAMINE HYDROCHLORIDE 12.5 MG: 50 INJECTION, SOLUTION INTRAMUSCULAR; INTRAVENOUS at 22:19

## 2020-01-01 RX ADMIN — HYDROMORPHONE HYDROCHLORIDE 0.5 MG: 1 INJECTION, SOLUTION INTRAMUSCULAR; INTRAVENOUS; SUBCUTANEOUS at 17:31

## 2020-01-01 RX ADMIN — METOPROLOL TARTRATE 25 MG: 25 TABLET, FILM COATED ORAL at 17:52

## 2020-01-01 RX ADMIN — BISACODYL 10 MG: 5 TABLET, COATED ORAL at 08:09

## 2020-01-01 RX ADMIN — Medication 1 AMPULE: at 09:50

## 2020-01-01 RX ADMIN — HYDROMORPHONE HYDROCHLORIDE 1 MG: 1 INJECTION, SOLUTION INTRAMUSCULAR; INTRAVENOUS; SUBCUTANEOUS at 01:08

## 2020-01-01 RX ADMIN — Medication 10 ML: at 22:07

## 2020-01-01 RX ADMIN — HYDROMORPHONE HYDROCHLORIDE 1 MG: 1 INJECTION, SOLUTION INTRAMUSCULAR; INTRAVENOUS; SUBCUTANEOUS at 23:45

## 2020-01-01 RX ADMIN — PROPOFOL 150 MG: 10 INJECTION, EMULSION INTRAVENOUS at 10:21

## 2020-01-01 RX ADMIN — DAPTOMYCIN 900 MG: 500 INJECTION, POWDER, LYOPHILIZED, FOR SOLUTION INTRAVENOUS at 14:20

## 2020-01-01 RX ADMIN — HYDROMORPHONE HYDROCHLORIDE 1 MG: 1 INJECTION, SOLUTION INTRAMUSCULAR; INTRAVENOUS; SUBCUTANEOUS at 21:54

## 2020-01-01 RX ADMIN — BUSPIRONE HYDROCHLORIDE 10 MG: 10 TABLET ORAL at 18:14

## 2020-01-01 RX ADMIN — DIPHENHYDRAMINE HYDROCHLORIDE 25 MG: 25 CAPSULE ORAL at 11:08

## 2020-01-01 RX ADMIN — HYDRALAZINE HYDROCHLORIDE 50 MG: 50 TABLET, FILM COATED ORAL at 23:09

## 2020-01-01 RX ADMIN — ROCURONIUM BROMIDE 5 MG: 10 SOLUTION INTRAVENOUS at 09:03

## 2020-01-01 RX ADMIN — HYDROMORPHONE HYDROCHLORIDE 0.5 MG: 1 INJECTION, SOLUTION INTRAMUSCULAR; INTRAVENOUS; SUBCUTANEOUS at 18:59

## 2020-01-01 RX ADMIN — BUMETANIDE 0.5 MG: 1 TABLET ORAL at 08:07

## 2020-01-01 RX ADMIN — HYDRALAZINE HYDROCHLORIDE 100 MG: 50 TABLET, FILM COATED ORAL at 08:22

## 2020-01-01 RX ADMIN — Medication 10 ML: at 17:30

## 2020-01-01 RX ADMIN — Medication 10 ML: at 21:33

## 2020-01-01 RX ADMIN — MIDAZOLAM 2 MG: 1 INJECTION INTRAMUSCULAR; INTRAVENOUS at 12:42

## 2020-01-01 RX ADMIN — HYDROMORPHONE HYDROCHLORIDE 1 MG: 2 TABLET ORAL at 09:03

## 2020-01-01 RX ADMIN — HEPARIN SODIUM AND DEXTROSE 15 UNITS/KG/HR: 10000; 5 INJECTION INTRAVENOUS at 15:59

## 2020-01-01 RX ADMIN — Medication 1 AMPULE: at 22:14

## 2020-01-01 RX ADMIN — METRONIDAZOLE 500 MG: 500 INJECTION, SOLUTION INTRAVENOUS at 06:18

## 2020-01-01 RX ADMIN — Medication 10 ML: at 21:25

## 2020-01-01 RX ADMIN — NYSTATIN: 100000 CREAM TOPICAL at 08:48

## 2020-01-01 RX ADMIN — Medication 80 MCG: at 08:14

## 2020-01-01 RX ADMIN — PROCHLORPERAZINE EDISYLATE 5 MG: 5 INJECTION INTRAMUSCULAR; INTRAVENOUS at 03:21

## 2020-01-01 RX ADMIN — Medication 10 ML: at 21:57

## 2020-01-01 RX ADMIN — POTASSIUM CHLORIDE 20 MEQ: 750 TABLET, FILM COATED, EXTENDED RELEASE ORAL at 08:07

## 2020-01-01 RX ADMIN — DOXAZOSIN 4 MG: 2 TABLET ORAL at 23:09

## 2020-01-01 RX ADMIN — INSULIN GLARGINE 10 UNITS: 100 INJECTION, SOLUTION SUBCUTANEOUS at 22:06

## 2020-01-01 RX ADMIN — BUMETANIDE 1 MG: 0.25 INJECTION INTRAMUSCULAR; INTRAVENOUS at 13:45

## 2020-01-01 RX ADMIN — HYDRALAZINE HYDROCHLORIDE 100 MG: 50 TABLET, FILM COATED ORAL at 16:19

## 2020-01-01 RX ADMIN — WARFARIN SODIUM 5 MG: 5 TABLET ORAL at 17:21

## 2020-01-01 RX ADMIN — WARFARIN SODIUM 2 MG: 2 TABLET ORAL at 17:55

## 2020-01-01 RX ADMIN — BUSPIRONE HYDROCHLORIDE 10 MG: 10 TABLET ORAL at 17:31

## 2020-01-01 RX ADMIN — HYDROMORPHONE HYDROCHLORIDE 0.5 MG: 1 INJECTION, SOLUTION INTRAMUSCULAR; INTRAVENOUS; SUBCUTANEOUS at 02:43

## 2020-01-01 RX ADMIN — GABAPENTIN 100 MG: 100 CAPSULE ORAL at 22:07

## 2020-01-01 RX ADMIN — DOXAZOSIN 4 MG: 2 TABLET ORAL at 21:15

## 2020-01-01 RX ADMIN — HYDRALAZINE HYDROCHLORIDE 100 MG: 50 TABLET, FILM COATED ORAL at 13:18

## 2020-01-01 RX ADMIN — HUMAN INSULIN 2 UNITS: 100 INJECTION, SOLUTION SUBCUTANEOUS at 22:58

## 2020-01-01 RX ADMIN — DILTIAZEM HYDROCHLORIDE 180 MG: 180 CAPSULE, COATED, EXTENDED RELEASE ORAL at 15:53

## 2020-01-01 RX ADMIN — PROPOFOL 150 MG: 10 INJECTION, EMULSION INTRAVENOUS at 12:59

## 2020-01-01 RX ADMIN — HYDRALAZINE HYDROCHLORIDE 100 MG: 50 TABLET, FILM COATED ORAL at 08:25

## 2020-01-01 RX ADMIN — Medication 10 ML: at 13:46

## 2020-01-01 RX ADMIN — Medication 10 ML: at 07:25

## 2020-01-01 RX ADMIN — Medication 10 ML: at 10:54

## 2020-01-01 RX ADMIN — VANCOMYCIN HYDROCHLORIDE 500 MG: 500 INJECTION, POWDER, LYOPHILIZED, FOR SOLUTION INTRAVENOUS at 16:56

## 2020-01-01 RX ADMIN — HYDROCORTISONE SODIUM SUCCINATE 100 MG: 100 INJECTION, POWDER, FOR SOLUTION INTRAMUSCULAR; INTRAVENOUS at 14:34

## 2020-01-01 RX ADMIN — HYDRALAZINE HYDROCHLORIDE 100 MG: 50 TABLET, FILM COATED ORAL at 09:08

## 2020-01-01 RX ADMIN — BUMETANIDE 2 MG: 1 TABLET ORAL at 07:20

## 2020-01-01 RX ADMIN — BUSPIRONE HYDROCHLORIDE 10 MG: 10 TABLET ORAL at 10:45

## 2020-01-01 RX ADMIN — ONDANSETRON 4 MG: 2 INJECTION INTRAMUSCULAR; INTRAVENOUS at 00:16

## 2020-01-01 RX ADMIN — APIXABAN 5 MG: 5 TABLET, FILM COATED ORAL at 09:51

## 2020-01-01 RX ADMIN — HYDRALAZINE HYDROCHLORIDE 100 MG: 50 TABLET, FILM COATED ORAL at 17:33

## 2020-01-01 RX ADMIN — LIDOCAINE HYDROCHLORIDE 20 ML: 10 INJECTION, SOLUTION INFILTRATION; PERINEURAL at 11:16

## 2020-01-01 RX ADMIN — BACITRACIN: 500 OINTMENT TOPICAL at 22:50

## 2020-01-01 RX ADMIN — HYDROMORPHONE HYDROCHLORIDE 0.5 MG: 1 INJECTION, SOLUTION INTRAMUSCULAR; INTRAVENOUS; SUBCUTANEOUS at 00:27

## 2020-01-01 RX ADMIN — BUSPIRONE HYDROCHLORIDE 10 MG: 10 TABLET ORAL at 08:47

## 2020-01-01 RX ADMIN — SODIUM CHLORIDE, SODIUM LACTATE, POTASSIUM CHLORIDE, AND CALCIUM CHLORIDE 1000 ML: 600; 310; 30; 20 INJECTION, SOLUTION INTRAVENOUS at 12:35

## 2020-01-01 RX ADMIN — HYDRALAZINE HYDROCHLORIDE 25 MG: 25 TABLET, FILM COATED ORAL at 17:14

## 2020-01-01 RX ADMIN — SERTRALINE HYDROCHLORIDE 75 MG: 50 TABLET ORAL at 08:41

## 2020-01-01 RX ADMIN — BUMETANIDE 2 MG: 1 TABLET ORAL at 09:13

## 2020-01-01 RX ADMIN — DIPHENHYDRAMINE HYDROCHLORIDE 12.5 MG: 50 INJECTION, SOLUTION INTRAMUSCULAR; INTRAVENOUS at 09:39

## 2020-01-01 RX ADMIN — INSULIN LISPRO 1 UNITS: 100 INJECTION, SOLUTION INTRAVENOUS; SUBCUTANEOUS at 22:00

## 2020-01-01 RX ADMIN — HYDROMORPHONE HYDROCHLORIDE 2 MG: 2 TABLET ORAL at 10:17

## 2020-01-01 RX ADMIN — SODIUM CHLORIDE 75 ML/HR: 900 INJECTION, SOLUTION INTRAVENOUS at 13:00

## 2020-01-01 RX ADMIN — WARFARIN SODIUM 3 MG: 1 TABLET ORAL at 17:05

## 2020-01-01 RX ADMIN — BUSPIRONE HYDROCHLORIDE 10 MG: 10 TABLET ORAL at 17:25

## 2020-01-01 RX ADMIN — HYDROMORPHONE HYDROCHLORIDE 0.5 MG: 1 INJECTION, SOLUTION INTRAMUSCULAR; INTRAVENOUS; SUBCUTANEOUS at 01:00

## 2020-01-01 RX ADMIN — FENTANYL CITRATE 50 MCG: 50 INJECTION, SOLUTION INTRAMUSCULAR; INTRAVENOUS at 18:45

## 2020-01-01 RX ADMIN — SERTRALINE HYDROCHLORIDE 125 MG: 50 TABLET ORAL at 08:24

## 2020-01-01 RX ADMIN — CARVEDILOL 12.5 MG: 12.5 TABLET, FILM COATED ORAL at 08:44

## 2020-01-01 RX ADMIN — HYDRALAZINE HYDROCHLORIDE 100 MG: 50 TABLET, FILM COATED ORAL at 21:18

## 2020-01-01 RX ADMIN — Medication 10 ML: at 21:13

## 2020-01-01 RX ADMIN — DILTIAZEM HYDROCHLORIDE 180 MG: 180 CAPSULE, COATED, EXTENDED RELEASE ORAL at 08:53

## 2020-01-01 RX ADMIN — SERTRALINE HYDROCHLORIDE 50 MG: 50 TABLET ORAL at 09:07

## 2020-01-01 RX ADMIN — INSULIN GLARGINE 10 UNITS: 100 INJECTION, SOLUTION SUBCUTANEOUS at 22:05

## 2020-01-01 RX ADMIN — SERTRALINE HYDROCHLORIDE 75 MG: 50 TABLET ORAL at 09:24

## 2020-01-01 RX ADMIN — Medication 20 ML: at 22:59

## 2020-01-01 RX ADMIN — Medication: at 13:57

## 2020-01-01 RX ADMIN — DAPTOMYCIN 900 MG: 500 INJECTION, POWDER, LYOPHILIZED, FOR SOLUTION INTRAVENOUS at 13:08

## 2020-01-01 RX ADMIN — HYDRALAZINE HYDROCHLORIDE 100 MG: 50 TABLET, FILM COATED ORAL at 08:07

## 2020-01-01 RX ADMIN — LIDOCAINE HYDROCHLORIDE 100 MG: 20 INJECTION, SOLUTION EPIDURAL; INFILTRATION; INTRACAUDAL; PERINEURAL at 09:03

## 2020-01-01 RX ADMIN — BUMETANIDE 0.5 MG: 1 TABLET ORAL at 09:32

## 2020-01-01 RX ADMIN — LIDOCAINE HYDROCHLORIDE 15 ML: 20 SOLUTION ORAL; TOPICAL at 12:41

## 2020-01-01 RX ADMIN — BUSPIRONE HYDROCHLORIDE 10 MG: 10 TABLET ORAL at 13:35

## 2020-01-01 RX ADMIN — HYDROMORPHONE HYDROCHLORIDE 0.5 MG: 1 INJECTION, SOLUTION INTRAMUSCULAR; INTRAVENOUS; SUBCUTANEOUS at 15:32

## 2020-01-01 RX ADMIN — CEFTRIAXONE 1 G: 1 INJECTION, POWDER, FOR SOLUTION INTRAMUSCULAR; INTRAVENOUS at 01:31

## 2020-01-01 RX ADMIN — SUCRALFATE 1 G: 1 TABLET ORAL at 17:33

## 2020-01-01 RX ADMIN — Medication: at 16:56

## 2020-01-01 RX ADMIN — BACITRACIN: 500 OINTMENT TOPICAL at 15:45

## 2020-01-01 RX ADMIN — LIDOCAINE HYDROCHLORIDE 80 MG: 20 INJECTION, SOLUTION EPIDURAL; INFILTRATION; INTRACAUDAL; PERINEURAL at 12:59

## 2020-01-01 RX ADMIN — Medication 10 ML: at 09:42

## 2020-01-01 RX ADMIN — EPOETIN ALFA-EPBX 20000 UNITS: 10000 INJECTION, SOLUTION INTRAVENOUS; SUBCUTANEOUS at 21:01

## 2020-01-01 RX ADMIN — HYDRALAZINE HYDROCHLORIDE 50 MG: 50 TABLET, FILM COATED ORAL at 15:28

## 2020-01-01 RX ADMIN — SODIUM CHLORIDE, POTASSIUM CHLORIDE, SODIUM LACTATE AND CALCIUM CHLORIDE: 600; 310; 30; 20 INJECTION, SOLUTION INTRAVENOUS at 10:09

## 2020-01-01 RX ADMIN — PANTOPRAZOLE SODIUM 40 MG: 40 TABLET, DELAYED RELEASE ORAL at 17:33

## 2020-01-01 RX ADMIN — Medication 10 ML: at 00:50

## 2020-01-01 RX ADMIN — DILTIAZEM HYDROCHLORIDE 180 MG: 180 CAPSULE, COATED, EXTENDED RELEASE ORAL at 09:00

## 2020-01-01 RX ADMIN — HYDROMORPHONE HYDROCHLORIDE 0.5 MG: 1 INJECTION, SOLUTION INTRAMUSCULAR; INTRAVENOUS; SUBCUTANEOUS at 12:50

## 2020-01-01 RX ADMIN — GABAPENTIN 100 MG: 100 CAPSULE ORAL at 21:57

## 2020-01-01 RX ADMIN — ENOXAPARIN SODIUM 111 MG: 120 INJECTION SUBCUTANEOUS at 16:53

## 2020-01-01 RX ADMIN — BACITRACIN: 500 OINTMENT TOPICAL at 11:31

## 2020-01-01 RX ADMIN — FENTANYL CITRATE 50 MCG: 50 INJECTION, SOLUTION INTRAMUSCULAR; INTRAVENOUS at 09:03

## 2020-01-01 RX ADMIN — AZITHROMYCIN MONOHYDRATE 500 MG: 500 INJECTION, POWDER, LYOPHILIZED, FOR SOLUTION INTRAVENOUS at 00:00

## 2020-01-01 RX ADMIN — HYDROMORPHONE HYDROCHLORIDE 0.5 MG: 1 INJECTION, SOLUTION INTRAMUSCULAR; INTRAVENOUS; SUBCUTANEOUS at 02:02

## 2020-01-01 RX ADMIN — SODIUM CHLORIDE 10 MG: 9 INJECTION INTRAMUSCULAR; INTRAVENOUS; SUBCUTANEOUS at 03:04

## 2020-01-01 RX ADMIN — MIDAZOLAM 1 MG: 1 INJECTION INTRAMUSCULAR; INTRAVENOUS at 14:10

## 2020-01-01 RX ADMIN — SODIUM CHLORIDE, SODIUM LACTATE, POTASSIUM CHLORIDE, AND CALCIUM CHLORIDE 25 ML/HR: 600; 310; 30; 20 INJECTION, SOLUTION INTRAVENOUS at 17:08

## 2020-01-01 RX ADMIN — BUSPIRONE HYDROCHLORIDE 10 MG: 10 TABLET ORAL at 10:52

## 2020-01-01 RX ADMIN — HYDRALAZINE HYDROCHLORIDE 100 MG: 50 TABLET, FILM COATED ORAL at 21:47

## 2020-01-01 RX ADMIN — Medication 10 ML: at 07:32

## 2020-01-01 RX ADMIN — ONDANSETRON 4 MG: 2 INJECTION INTRAMUSCULAR; INTRAVENOUS at 08:50

## 2020-01-01 RX ADMIN — DILTIAZEM HYDROCHLORIDE 10 MG: 5 INJECTION INTRAVENOUS at 13:38

## 2020-01-01 RX ADMIN — Medication 10 ML: at 15:40

## 2020-01-01 RX ADMIN — HYDRALAZINE HYDROCHLORIDE 20 MG: 20 INJECTION INTRAMUSCULAR; INTRAVENOUS at 03:16

## 2020-01-01 RX ADMIN — HYDRALAZINE HYDROCHLORIDE 100 MG: 50 TABLET, FILM COATED ORAL at 22:57

## 2020-01-01 RX ADMIN — SERTRALINE HYDROCHLORIDE 125 MG: 50 TABLET ORAL at 08:22

## 2020-01-01 RX ADMIN — BUSPIRONE HYDROCHLORIDE 10 MG: 10 TABLET ORAL at 08:22

## 2020-01-01 RX ADMIN — ROCURONIUM BROMIDE 25 MG: 10 INJECTION INTRAVENOUS at 07:45

## 2020-01-01 RX ADMIN — ROCURONIUM BROMIDE 5 MG: 10 SOLUTION INTRAVENOUS at 10:21

## 2020-01-01 RX ADMIN — OXYCODONE HYDROCHLORIDE AND ACETAMINOPHEN 1 TABLET: 5; 325 TABLET ORAL at 22:41

## 2020-01-01 RX ADMIN — HYDRALAZINE HYDROCHLORIDE 100 MG: 50 TABLET, FILM COATED ORAL at 16:54

## 2020-01-01 RX ADMIN — SERTRALINE HYDROCHLORIDE 75 MG: 50 TABLET ORAL at 08:47

## 2020-01-01 RX ADMIN — Medication 1 AMPULE: at 08:42

## 2020-01-01 RX ADMIN — POTASSIUM BICARBONATE 40 MEQ: 782 TABLET, EFFERVESCENT ORAL at 21:47

## 2020-01-01 RX ADMIN — HYDRALAZINE HYDROCHLORIDE 100 MG: 50 TABLET, FILM COATED ORAL at 22:06

## 2020-01-01 RX ADMIN — HYDROMORPHONE HYDROCHLORIDE 0.5 MG: 1 INJECTION, SOLUTION INTRAMUSCULAR; INTRAVENOUS; SUBCUTANEOUS at 15:02

## 2020-01-01 RX ADMIN — INSULIN GLARGINE 10 UNITS: 100 INJECTION, SOLUTION SUBCUTANEOUS at 22:22

## 2020-01-01 RX ADMIN — HYDRALAZINE HYDROCHLORIDE 100 MG: 50 TABLET, FILM COATED ORAL at 22:01

## 2020-01-01 RX ADMIN — METOPROLOL TARTRATE 25 MG: 25 TABLET, FILM COATED ORAL at 10:52

## 2020-01-01 RX ADMIN — DOXAZOSIN 4 MG: 2 TABLET ORAL at 22:07

## 2020-01-01 RX ADMIN — HYDROMORPHONE HYDROCHLORIDE 0.5 MG: 1 INJECTION, SOLUTION INTRAMUSCULAR; INTRAVENOUS; SUBCUTANEOUS at 08:17

## 2020-01-01 RX ADMIN — SERTRALINE HYDROCHLORIDE 125 MG: 50 TABLET ORAL at 12:05

## 2020-01-01 RX ADMIN — SUCCINYLCHOLINE CHLORIDE 120 MG: 20 INJECTION, SOLUTION INTRAMUSCULAR; INTRAVENOUS at 09:03

## 2020-01-01 RX ADMIN — Medication 10 ML: at 14:19

## 2020-01-01 RX ADMIN — GABAPENTIN 100 MG: 100 CAPSULE ORAL at 09:03

## 2020-01-01 RX ADMIN — ONDANSETRON 4 MG: 2 INJECTION INTRAMUSCULAR; INTRAVENOUS at 15:35

## 2020-01-01 RX ADMIN — VANCOMYCIN HYDROCHLORIDE 2250 MG: 10 INJECTION, POWDER, LYOPHILIZED, FOR SOLUTION INTRAVENOUS at 21:23

## 2020-01-01 RX ADMIN — OXYCODONE HYDROCHLORIDE AND ACETAMINOPHEN 1 TABLET: 5; 325 TABLET ORAL at 09:53

## 2020-01-01 RX ADMIN — NITROGLYCERIN 0.4 MG: 0.4 TABLET, ORALLY DISINTEGRATING SUBLINGUAL at 19:31

## 2020-01-01 RX ADMIN — HYDROMORPHONE HYDROCHLORIDE 0.5 MG: 1 INJECTION, SOLUTION INTRAMUSCULAR; INTRAVENOUS; SUBCUTANEOUS at 19:17

## 2020-01-01 RX ADMIN — PHYTONADIONE 5 MG: 10 INJECTION, EMULSION INTRAMUSCULAR; INTRAVENOUS; SUBCUTANEOUS at 09:07

## 2020-01-01 RX ADMIN — NYSTATIN: 100000 CREAM TOPICAL at 08:09

## 2020-01-01 RX ADMIN — ONDANSETRON HYDROCHLORIDE 4 MG: 2 INJECTION, SOLUTION INTRAMUSCULAR; INTRAVENOUS at 15:33

## 2020-01-01 RX ADMIN — GABAPENTIN 100 MG: 100 CAPSULE ORAL at 17:40

## 2020-01-01 RX ADMIN — BUMETANIDE 2 MG: 0.25 INJECTION, SOLUTION INTRAMUSCULAR; INTRAVENOUS at 21:26

## 2020-01-01 RX ADMIN — DIPHENHYDRAMINE HYDROCHLORIDE 12.5 MG: 50 INJECTION, SOLUTION INTRAMUSCULAR; INTRAVENOUS at 21:40

## 2020-01-01 RX ADMIN — CEFTRIAXONE 1 G: 1 INJECTION, POWDER, FOR SOLUTION INTRAMUSCULAR; INTRAVENOUS at 00:39

## 2020-01-01 RX ADMIN — SERTRALINE HYDROCHLORIDE 50 MG: 50 TABLET ORAL at 08:13

## 2020-01-01 RX ADMIN — POTASSIUM CHLORIDE 10 MEQ: 14.9 INJECTION, SOLUTION INTRAVENOUS at 13:33

## 2020-01-01 RX ADMIN — HYDRALAZINE HYDROCHLORIDE 100 MG: 50 TABLET, FILM COATED ORAL at 15:37

## 2020-01-01 RX ADMIN — SERTRALINE HYDROCHLORIDE 50 MG: 50 TABLET ORAL at 15:57

## 2020-01-01 RX ADMIN — SODIUM CHLORIDE 75 ML/HR: 900 INJECTION, SOLUTION INTRAVENOUS at 08:16

## 2020-01-01 RX ADMIN — SUCRALFATE 1 G: 1 TABLET ORAL at 21:00

## 2020-01-01 RX ADMIN — SUCRALFATE 1 G: 1 TABLET ORAL at 18:01

## 2020-01-01 RX ADMIN — HYDRALAZINE HYDROCHLORIDE 100 MG: 50 TABLET, FILM COATED ORAL at 22:38

## 2020-01-01 RX ADMIN — Medication 10 ML: at 09:40

## 2020-01-01 RX ADMIN — FENTANYL CITRATE 50 MCG: 50 INJECTION INTRAMUSCULAR; INTRAVENOUS at 22:52

## 2020-01-01 RX ADMIN — SERTRALINE HYDROCHLORIDE 50 MG: 50 TABLET ORAL at 08:41

## 2020-01-01 RX ADMIN — PROPOFOL 150 MCG/KG/MIN: 10 INJECTION, EMULSION INTRAVENOUS at 11:45

## 2020-01-01 RX ADMIN — SERTRALINE HYDROCHLORIDE 125 MG: 50 TABLET ORAL at 08:44

## 2020-01-01 RX ADMIN — TRAMADOL HYDROCHLORIDE 50 MG: 50 TABLET, FILM COATED ORAL at 10:21

## 2020-01-01 RX ADMIN — Medication 120 MCG: at 10:42

## 2020-01-01 RX ADMIN — OXYCODONE HYDROCHLORIDE AND ACETAMINOPHEN 1 TABLET: 5; 325 TABLET ORAL at 23:47

## 2020-01-01 RX ADMIN — SODIUM CHLORIDE 50 ML/HR: 900 INJECTION, SOLUTION INTRAVENOUS at 08:55

## 2020-01-01 RX ADMIN — Medication 1 AMPULE: at 09:07

## 2020-01-01 RX ADMIN — BUSPIRONE HYDROCHLORIDE 10 MG: 10 TABLET ORAL at 09:04

## 2020-01-01 RX ADMIN — COLCHICINE 0.6 MG: 0.6 TABLET, FILM COATED ORAL at 17:21

## 2020-01-01 RX ADMIN — DILTIAZEM HYDROCHLORIDE 180 MG: 180 CAPSULE, COATED, EXTENDED RELEASE ORAL at 09:38

## 2020-01-01 RX ADMIN — HYDRALAZINE HYDROCHLORIDE 100 MG: 50 TABLET, FILM COATED ORAL at 16:18

## 2020-01-01 RX ADMIN — WARFARIN SODIUM 3 MG: 2 TABLET ORAL at 17:17

## 2020-01-01 RX ADMIN — Medication 10 ML: at 09:58

## 2020-01-01 RX ADMIN — HYDRALAZINE HYDROCHLORIDE 100 MG: 50 TABLET, FILM COATED ORAL at 17:05

## 2020-01-01 RX ADMIN — BUMETANIDE 0.5 MG: 1 TABLET ORAL at 09:01

## 2020-01-01 RX ADMIN — MIDAZOLAM 2 MG: 1 INJECTION INTRAMUSCULAR; INTRAVENOUS at 11:33

## 2020-01-01 RX ADMIN — PROPOFOL 20 MG: 10 INJECTION, EMULSION INTRAVENOUS at 11:53

## 2020-01-01 RX ADMIN — BUMETANIDE 1 MG: 0.25 INJECTION INTRAMUSCULAR; INTRAVENOUS at 18:26

## 2020-01-01 RX ADMIN — HYDRALAZINE HYDROCHLORIDE 25 MG: 25 TABLET, FILM COATED ORAL at 21:02

## 2020-01-01 RX ADMIN — HYDROMORPHONE HYDROCHLORIDE 2 MG: 2 TABLET ORAL at 20:04

## 2020-01-01 RX ADMIN — INSULIN GLARGINE 10 UNITS: 100 INJECTION, SOLUTION SUBCUTANEOUS at 08:54

## 2020-01-01 RX ADMIN — Medication 10 ML: at 14:00

## 2020-01-01 RX ADMIN — WARFARIN SODIUM 2 MG: 2 TABLET ORAL at 17:21

## 2020-01-01 RX ADMIN — PANTOPRAZOLE SODIUM 40 MG: 40 INJECTION, POWDER, FOR SOLUTION INTRAVENOUS at 09:57

## 2020-01-01 RX ADMIN — HYDROMORPHONE HYDROCHLORIDE 0.5 MG: 1 INJECTION, SOLUTION INTRAMUSCULAR; INTRAVENOUS; SUBCUTANEOUS at 07:12

## 2020-01-01 RX ADMIN — HYDROMORPHONE HYDROCHLORIDE 3 MG: 2 TABLET ORAL at 10:33

## 2020-01-01 RX ADMIN — HYDROMORPHONE HYDROCHLORIDE 2 MG: 2 TABLET ORAL at 04:38

## 2020-01-01 RX ADMIN — INSULIN GLARGINE 10 UNITS: 100 INJECTION, SOLUTION SUBCUTANEOUS at 08:04

## 2020-01-01 RX ADMIN — HYDRALAZINE HYDROCHLORIDE 100 MG: 50 TABLET, FILM COATED ORAL at 21:37

## 2020-01-01 RX ADMIN — WARFARIN SODIUM 2 MG: 2 TABLET ORAL at 19:07

## 2020-01-01 RX ADMIN — HYDRALAZINE HYDROCHLORIDE 100 MG: 50 TABLET, FILM COATED ORAL at 08:53

## 2020-01-01 RX ADMIN — SODIUM CHLORIDE, SODIUM LACTATE, POTASSIUM CHLORIDE, AND CALCIUM CHLORIDE 25 ML/HR: 600; 310; 30; 20 INJECTION, SOLUTION INTRAVENOUS at 06:59

## 2020-01-01 RX ADMIN — OXYCODONE HYDROCHLORIDE AND ACETAMINOPHEN 1 TABLET: 5; 325 TABLET ORAL at 21:07

## 2020-01-01 RX ADMIN — HYDROMORPHONE HYDROCHLORIDE 0.5 MG: 1 INJECTION, SOLUTION INTRAMUSCULAR; INTRAVENOUS; SUBCUTANEOUS at 05:44

## 2020-01-01 RX ADMIN — BUMETANIDE 1 MG: 1 TABLET ORAL at 08:22

## 2020-01-01 RX ADMIN — HYDROMORPHONE HYDROCHLORIDE 0.5 MG: 1 INJECTION, SOLUTION INTRAMUSCULAR; INTRAVENOUS; SUBCUTANEOUS at 00:33

## 2020-01-01 RX ADMIN — HYDROMORPHONE HYDROCHLORIDE 2 MG: 2 TABLET ORAL at 04:24

## 2020-01-01 RX ADMIN — BUSPIRONE HYDROCHLORIDE 10 MG: 10 TABLET ORAL at 17:59

## 2020-01-01 RX ADMIN — HYDRALAZINE HYDROCHLORIDE 100 MG: 50 TABLET, FILM COATED ORAL at 08:50

## 2020-01-01 RX ADMIN — MEROPENEM 500 MG: 500 INJECTION, POWDER, FOR SOLUTION INTRAVENOUS at 13:35

## 2020-01-01 RX ADMIN — FENTANYL CITRATE 25 MCG: 50 INJECTION INTRAMUSCULAR; INTRAVENOUS at 09:15

## 2020-01-01 RX ADMIN — ENOXAPARIN SODIUM 111 MG: 120 INJECTION SUBCUTANEOUS at 07:53

## 2020-01-01 RX ADMIN — HYDRALAZINE HYDROCHLORIDE 100 MG: 50 TABLET, FILM COATED ORAL at 15:43

## 2020-01-01 RX ADMIN — SERTRALINE HYDROCHLORIDE 75 MG: 50 TABLET ORAL at 10:45

## 2020-01-01 RX ADMIN — FENTANYL CITRATE 50 MCG: 50 INJECTION, SOLUTION INTRAMUSCULAR; INTRAVENOUS at 09:09

## 2020-01-01 RX ADMIN — ONDANSETRON 4 MG: 2 INJECTION INTRAMUSCULAR; INTRAVENOUS at 17:51

## 2020-01-01 RX ADMIN — HYDROMORPHONE HYDROCHLORIDE 1 MG: 1 INJECTION, SOLUTION INTRAMUSCULAR; INTRAVENOUS; SUBCUTANEOUS at 09:06

## 2020-01-01 RX ADMIN — BUMETANIDE 1 MG: 1 TABLET ORAL at 08:44

## 2020-01-01 RX ADMIN — SERTRALINE HYDROCHLORIDE 50 MG: 50 TABLET ORAL at 09:14

## 2020-01-01 RX ADMIN — Medication 10 ML: at 07:00

## 2020-01-01 RX ADMIN — INSULIN GLARGINE 10 UNITS: 100 INJECTION, SOLUTION SUBCUTANEOUS at 09:51

## 2020-01-01 RX ADMIN — Medication 1 AMPULE: at 09:01

## 2020-01-01 RX ADMIN — VANCOMYCIN HYDROCHLORIDE 1 G: 10 INJECTION, POWDER, LYOPHILIZED, FOR SOLUTION INTRAVENOUS at 14:32

## 2020-01-01 RX ADMIN — VANCOMYCIN HYDROCHLORIDE 1250 MG: 10 INJECTION, POWDER, LYOPHILIZED, FOR SOLUTION INTRAVENOUS at 20:50

## 2020-01-01 RX ADMIN — FENTANYL CITRATE 50 MCG: 50 INJECTION, SOLUTION INTRAMUSCULAR; INTRAVENOUS at 17:02

## 2020-01-01 RX ADMIN — WARFARIN SODIUM 3 MG: 2 TABLET ORAL at 17:11

## 2020-01-01 RX ADMIN — MIDAZOLAM 2 MG: 1 INJECTION INTRAMUSCULAR; INTRAVENOUS at 10:12

## 2020-01-01 RX ADMIN — HYDRALAZINE HYDROCHLORIDE 50 MG: 50 TABLET, FILM COATED ORAL at 17:33

## 2020-01-01 RX ADMIN — HYDRALAZINE HYDROCHLORIDE 100 MG: 50 TABLET, FILM COATED ORAL at 08:14

## 2020-01-01 RX ADMIN — IRON SUCROSE 300 MG: 20 INJECTION, SOLUTION INTRAVENOUS at 15:33

## 2020-01-01 RX ADMIN — BUMETANIDE 2 MG: 1 TABLET ORAL at 07:27

## 2020-01-01 RX ADMIN — HYDRALAZINE HYDROCHLORIDE 50 MG: 50 TABLET, FILM COATED ORAL at 08:05

## 2020-01-01 RX ADMIN — DILTIAZEM HYDROCHLORIDE 180 MG: 180 CAPSULE, COATED, EXTENDED RELEASE ORAL at 08:05

## 2020-01-01 RX ADMIN — PROMETHAZINE HYDROCHLORIDE 25 MG: 25 TABLET ORAL at 21:51

## 2020-01-01 RX ADMIN — HUMAN INSULIN 2 UNITS: 100 INJECTION, SOLUTION SUBCUTANEOUS at 18:31

## 2020-01-01 RX ADMIN — WARFARIN SODIUM 5 MG: 5 TABLET ORAL at 17:43

## 2020-01-01 RX ADMIN — Medication 80 MCG: at 15:03

## 2020-01-01 RX ADMIN — Medication 10 ML: at 14:39

## 2020-01-01 RX ADMIN — HYDRALAZINE HYDROCHLORIDE 100 MG: 50 TABLET, FILM COATED ORAL at 21:45

## 2020-01-01 RX ADMIN — Medication 10 ML: at 09:11

## 2020-01-01 RX ADMIN — BUMETANIDE 1 MG: 1 TABLET ORAL at 17:21

## 2020-01-01 RX ADMIN — Medication 10 ML: at 16:05

## 2020-01-01 RX ADMIN — DIPHENHYDRAMINE HYDROCHLORIDE 50 MG: 50 INJECTION, SOLUTION INTRAMUSCULAR; INTRAVENOUS at 14:34

## 2020-01-01 RX ADMIN — MEROPENEM 500 MG: 500 INJECTION, POWDER, FOR SOLUTION INTRAVENOUS at 00:34

## 2020-01-01 RX ADMIN — NYSTATIN: 100000 CREAM TOPICAL at 18:13

## 2020-01-01 RX ADMIN — HYDROMORPHONE HYDROCHLORIDE 0.5 MG: 1 INJECTION, SOLUTION INTRAMUSCULAR; INTRAVENOUS; SUBCUTANEOUS at 15:38

## 2020-01-01 RX ADMIN — HYDROMORPHONE HYDROCHLORIDE 0.5 MG: 1 INJECTION, SOLUTION INTRAMUSCULAR; INTRAVENOUS; SUBCUTANEOUS at 20:25

## 2020-01-01 RX ADMIN — BUMETANIDE 1 MG: 0.25 INJECTION INTRAMUSCULAR; INTRAVENOUS at 17:52

## 2020-01-01 RX ADMIN — HYDROMORPHONE HYDROCHLORIDE 3 MG: 2 TABLET ORAL at 22:23

## 2020-01-01 RX ADMIN — LIDOCAINE HYDROCHLORIDE 80 MG: 20 INJECTION, SOLUTION EPIDURAL; INFILTRATION; INTRACAUDAL; PERINEURAL at 07:39

## 2020-01-01 RX ADMIN — GABAPENTIN 100 MG: 100 CAPSULE ORAL at 21:01

## 2020-01-03 DIAGNOSIS — I48.0 PAF (PAROXYSMAL ATRIAL FIBRILLATION) (HCC): ICD-10-CM

## 2020-01-03 DIAGNOSIS — Z79.01 LONG TERM (CURRENT) USE OF ANTICOAGULANTS: Primary | ICD-10-CM

## 2020-01-03 LAB
INR PPP: 2.9 (ref 0.8–1.2)
PROTHROMBIN TIME: 27.3 SEC (ref 9.1–12)

## 2020-01-03 NOTE — PROGRESS NOTES
Inform patient INR is at the top of the therapeutic range. Confirm current dose of warfarin 4 mg tablets  1 tablet on Mon and Thurs and a half tablet the other 5 days. . No change in warfarin dose. Repeat in 2 weeks to assure stabiltiy. Patient informed in My Chart.

## 2020-01-07 ENCOUNTER — HOSPITAL ENCOUNTER (OUTPATIENT)
Dept: WOUND CARE | Age: 60
Discharge: HOME | End: 2020-01-07
Payer: COMMERCIAL

## 2020-01-07 VITALS
SYSTOLIC BLOOD PRESSURE: 192 MMHG | TEMPERATURE: 96.6 F | DIASTOLIC BLOOD PRESSURE: 89 MMHG | RESPIRATION RATE: 16 BRPM | HEART RATE: 86 BPM

## 2020-01-07 DIAGNOSIS — L03.115 CELLULITIS OF RIGHT LOWER EXTREMITY: ICD-10-CM

## 2020-01-07 PROCEDURE — 29445 APPL RIGID TOT CNTC LEG CAST: CPT

## 2020-01-07 PROCEDURE — 97597 DBRDMT OPN WND 1ST 20 CM/<: CPT

## 2020-01-07 NOTE — DISCHARGE INSTRUCTIONS
Return to see MD in 1 weekDischarge Instructions for  [place] [id number], [address] [place] [address]  Telephone: [phone]    NAME:  Patrick An OF BIRTH:  1960  MEDICAL RECORD NUMBER:  [id number]  DATE:  1/7/2020  Right foot wound  Clean with NS, apply endoform, HFBR, gauze RG, tape    Letf foot wounds. .. proximal and plantar     Clean with NE, pat dry, apply ACAG, 4x4s, TCC    Return to see MD in 1 week      Wound Cleansing:   Do not scrub or use excessive force. Cleanse wound prior to applying a clean dressing with:  [] Normal Saline [] Keep Wound Dry in Shower    [] Wound Cleanser   [] Cleanse wound with Mild Soap & Water  [] May Shower at Discharge   [] Other:      Topical Treatments:  Do not apply lotions, creams, or ointments to wound bed unless directed. [] Apply moisturizing lotion to skin surrounding the wound prior to dressing change.  [] Apply antifungal ointment to skin surrounding the wound prior to dressing change.  [] Apply thin film of moisture barrier ointment to skin immediately around wound.   [] Other:       Dressings:           Wound Location  right foot   [] Apply Primary Dressing:       [] MediHoney Gel [] Alginate with Silver [] Alginate   [x] Collagen  Endoform, HFBR,  [] Collagen with Silver   [] Santyl with Moisten saline gauze     [] Hydrocolloid   [] MediHoney Alginate [] Foam with Silver   [] Foam   [] Hydrofera Blue    [] Mepilex Border    [] Moisten with Saline [] Hydrogel [] Mepitel  [] Betadine moist gauze   [] Bactroban/Mupirocin [] Polysporin  [] Other:                Gauze, RG tape      Dressings:           Wound Location,  Left foor wound  [x] Apply Primary Dressing:       [] MediHoney Gel [x] Alginate with Silver [] Alginate   [] Collagen  Endoform, HFBR,  [] Collagen with Silver   [] Santyl with Moisten saline gauze     [] Hydrocolloid   [] MediHoney Alginate [] Foam with Silver   [] Foam   [] Hydrofera Blue    [] Mepilex Border    [] Moisten with Saline [] Hydrogel [] Mepitel  [] Betadine moist gauze   [] Bactroban/Mupirocin [] Polysporin  [] Other:      [] Pack wound loosely with  [] Iodoform   [] Plain Packing  [] Other   [] Cover and Secure with:     [x] Gauze [] Tejinder [] Kerlix   [] Ace Wrap [x] Cover Roll Tape [] ABD [] Exudry    [] Other:    Avoid contact of tape with skin. [] Change dressing: [] Daily    [x] Every Other Day [] Three times per week   [] Once a week [] Do Not Change Dressing   [] Other:     Edema Control:  Apply: [] Compression Stocking []Right Leg []Left Leg   [] Tubigrip []Right Leg Double Layer []Left Leg Double Layer       []Right Leg Single Layer []Left Leg Single Layer   [] SpandaGrip []Right Leg  []Left Leg      []Low compression 5-10 mm/Hg      []Medium compression 10-20 mm/Hg     []High compression  20-30 mm/Hg   every morning immediately when getting up should be applied to affected leg(s) from mid foot to knee making sure to cover the heel. Remove every night before going to bed. [] Elevate leg(s) above the level of the heart when sitting. [] Avoid prolonged standing in one place. [] Elevate arm/hand above the level of the heart []RightArm []LeftArm    Off-Loading:   [] Off-loading when [] walking  [] in bed [] sitting  [] Total non-weight bearing  [] Right Leg  [] Left Leg   [] Assistive Device [] Walker [] Cane  [] Wheelchair  [] Crutches   [] Surgical shoe    [] Podus Boot(s)   [] Foam Boot(s)  [] Roll About    [] Cast Boot [] CROW Boot  [] Other:    Contact Cast:  Apply: [x] Total Contact Cast Applied in Clinic []RightLeg [x]Left Leg   [x] Do not get cast wet. Contact center or go to emergency room if there is a foul odor or becomes uncomfortable due to feeling tight or swelling. Do not use objects inside of cast to scratch.       Dietary:  [] Diet as tolerated: [] Diabetic Diet: Low carb no sugar [] No Added Salt:  [] Increase Protein: [] Other:   Activity:  [] Activity as tolerated:  [] Patient has no activity restrictions     [] Strict Bedrest: [] Remain off Work:     [] May return to full duty work:                                   [] Return to work with restrictions:             Return Appointment:  [] Wound and dressing supply provider:   [] ECF or Home Healthcare:  [] Wound Assessment: [] Physician or NP scheduled for Wound Assessment:   [x] Return Appointment: With MD  in  1 Week(s)  [] Ordered tests:      Electronically signed Chacha Clifford RN on 1/7/2020 at 4:00 PM     Marisela Cruz 281: Should you experience any significant changes in your wound(s) or have questions about your wound care, please contact the [facility] at [address] [place] [address] 8:00 am - 4:30. If you need help with your wound outside these hours and cannot wait until we are again available, contact your PCP or go to the hospital emergency room. PLEASE NOTE: IF YOU ARE UNABLE TO OBTAIN WOUND SUPPLIES, CONTINUE TO USE THE SUPPLIES YOU HAVE AVAILABLE UNTIL YOU ARE ABLE TO REACH US. IT IS MOST IMPORTANT TO KEEP THE WOUND COVERED AT ALL TIMES.      Physician Signature:_______________________    Date: ___________ Time:  ____________

## 2020-01-07 NOTE — PROGRESS NOTES
Patient presents to wound clinic for: Brayton Schwab is a 60 y.o. female who presents for wound care of bilateral plantar foot ulcers. Patient currently being treated by Dr. Juliana Hale for a wound of the left breast, and was referred to me for further offloading and wound care recommendations of the bilateral heel ulcers. The ulcer of the plantar left heel occurred first and patient was placed in a heel offloading shoe. She then subsequently developed an ulceration of the plantar 5th met base of the right foot. She has been wearing her new diabetic shoes without any issues. Denies f/c/n/v/d. No new issues since last week. No issues over the past week with the TCC to the E.     Pertinent Medical History:  Past Medical History:   Diagnosis Date    Acquired lymphedema     Right arm    Arrhythmia     Asthma     Atrial fibrillation (HCC)     Dr. Rinku Glass and Dr. Fouzia Haas Atrial flutter ([facility] [address]     Cancer [facility])     bilateral breast    Chronic kidney disease     Diabetes mellitus type II     Dizziness     Essential hypertension     Hyperlipidemia     Hypertension     Long term (current) use of anticoagulants     Morbid obesity ([facility] [address] 3/14/2012    Osteoarthritis     Pacemaker     Sick sinus syndrome ([facility] [address]      Past Surgical History:   Procedure Laterality Date    ABDOMEN SURGERY PROC UNLISTED      gastric bypass    GASTRIC BYPASS,OBESE<150CM SAW-EN-Y  7/08    [facility]    HX AFIB ABLATION      HX CARPAL TUNNEL RELEASE      HX CERVICAL FUSION  1994    HX DILATION AND CURETTAGE  1992    HX MASTECTOMY  1998    RIGHT MODIFIED RADICAL     HX MOHS PROCEDURES  1995    right    HX ORTHOPAEDIC      ight knee surgery    HX PACEMAKER      IR INSERT TUNL CVC W PORT OVER 5 YEARS      [address]  8.21.07    [address] AND 1994    SC TRABECULOPLASTY BY LASER SURGERY      US GUIDE ASP OVARIAN CYST ABD APPROACH  8.21.07    RIGHT Prior to Admission medications    Medication Sig Start Date End Date Taking? Authorizing Provider   warfarin (COUMADIN) 4 mg tablet Take 1 tablet on Mon and Thurs and a half tablet the other 5 days. 12/12/19  Yes Radha Espino MD   potassium chloride SR (KLOR-CON 10) 10 mEq tablet Take 10 mEq by mouth daily. Indications: Patient states she intends to take this medication untl she discusses with Dr. Gregoria Nation on 12-12-19. Yes Provider, Historical   insulin glargine (LANTUS U-100 INSULIN) 100 unit/mL injection Inject 20 units daily 11/10/19  Yes Radha Espino MD   hydrALAZINE (APRESOLINE) 100 mg tablet TAKE ONE TABLET BY MOUTH THREE TIMES A DAY 10/21/19  Yes Radha Espino MD   sertraline (ZOLOFT) 50 mg tablet TAKE ONE AND ONE-HALF (1 & 1/2) TABLET BY MOUTH DAILY 8/22/19  Yes Radha Espino MD   bumetanide (BUMEX) 1 mg tablet Take 2 mg by mouth daily. Yes Provider, Historical   cloNIDine HCl (CATAPRES) 0.3 mg tablet Take 0.6 mg by mouth nightly. Yes Provider, Historical   metoprolol succinate (TOPROL-XL) 100 mg tablet TAKE TWO TABLETS BY MOUTH DAILY 7/12/19  Yes Radha Espino MD   doxazosin (CARDURA) 4 mg tablet TAKE ONE TABLET BY MOUTH ONCE NIGHTLY 6/14/19  Yes Radha Espino MD   busPIRone (BUSPAR) 10 mg tablet TAKE ONE TABLET BY MOUTH TWICE A DAY 5/24/19  Yes Radha Espino MD   cholecalciferol, VITAMIN D3, (VITAMIN D3) 5,000 unit tab tablet Take 5,000 Units by mouth daily. Yes Provider, Historical   vitamin A 8,000 unit capsule Take 8,000 Units by mouth daily. Yes Provider, Historical   cyanocobalamin (VITAMIN B-12) 1,000 mcg sublingual tablet Take 1,000 mcg by mouth daily. Yes Provider, Historical   metolazone (ZAROXOLYN) 5 mg tablet Take 1 Tab by mouth daily as needed (take if develop increased LE edema, weight gain > 5 pounds. ). 4/10/14   Shon Fowler NP   insulin lispro (HUMALOG) 100 unit/mL kwikpen 2 units with dinner for sugars over 200 1/3/14 Beth Mcnair MD     Allergies   Allergen Reactions    Ace Inhibitors Cough    Amlodipine Swelling    Avapro [Irbesartan] Unable to Obtain    Bactrim [Sulfamethoxazole-Trimethoprim] Other (comments)     Sore mouth    Captopril Cough    Carvedilol Diarrhea    Fish Containing Products Hives    Morphine Nausea and Vomiting and Swelling    Penicillins Hives    Pravachol [Pravastatin] Nausea Only    Seafood [Shellfish Containing Products] Hives    Singulair [Montelukast] Other (comments)     palpitations     Family History   Problem Relation Age of Onset    Cancer Mother     Heart Disease Mother     Alcohol abuse Father     Diabetes Brother     Hypertension Brother      Social History     Socioeconomic History    Marital status:      Spouse name: Not on file    Number of children: Not on file    Years of education: Not on file    Highest education level: Not on file   Occupational History    Not on file   Social Needs    Financial resource strain: Not on file    Food insecurity:     Worry: Not on file     Inability: Not on file    Transportation needs:     Medical: Not on file     Non-medical: Not on file   Tobacco Use    Smoking status: Never Smoker    Smokeless tobacco: Never Used   Substance and Sexual Activity    Alcohol use: Yes     Comment: rare    Drug use: No    Sexual activity: Not on file   Lifestyle    Physical activity:     Days per week: Not on file     Minutes per session: Not on file    Stress: Not on file   Relationships    Social connections:     Talks on phone: Not on file     Gets together: Not on file     Attends Jainism service: Not on file     Active member of club or organization: Not on file     Attends meetings of clubs or organizations: Not on file     Relationship status: Not on file    Intimate partner violence:     Fear of current or ex partner: Not on file     Emotionally abused: Not on file     Physically abused: Not on file     Forced sexual activity: Not on file   Other Topics Concern    Not on file   Social History Narrative    Not on file       Vitals:    01/07/20 1442   BP: 192/89   Pulse: 86   Resp: 16   Temp: 96.6 °F (35.9 °C)       Review of Systems:    Gen: No fever, chills, malaise, weight loss/gain. Heent: No headache, rhinorrhea, epistaxis, ear pain, hearing loss, sinus pain, neck pain/stiffness, sore throat. Heart: No chest pain, palpitations, CHOW, pnd, or orthopnea. Resp: No cough, hemoptysis, wheezing and shortness of breath. GI: No nausea, vomiting, diarrhea, constipation, melena or hematochezia. : No urinary obstruction, dysuria or hematuria. Derm: see below  Musc/skeletal: no bone or joint complains. Vasc: No edema, cyanosis or claudication. Endo: No heat/cold intolerance, no polyuria,polydipsia or polyphagia. Neuro: No unilateral weakness, numbness, tingling. No seizures. Heme: No easy bruising or bleeding. Wound Description:  Refer to nursing notes for measurements. Ulcer Debridement    Left plantar foot wound    Character of Ulcer: Improved    Indication for Debridement: Slough, Exudate, Abnormal wound edge and Abnormal Wound base     Pre debridement measurements: 2.7 x 2.3 x 0.3 cm    Risks of procedure were discussed with patient. Consent has been signed. Anesthetic: Lidocaine 4% topical cream     Level of Debridement: Subctaneous Tissue , muscule    Tissue and/or Material removed: Exudate, Fibrin/ Slough and Subcutaneous,     Type of Tissue: Non- Viable      Pre-debridement severity: Fat Layer Exposed     Post debridement severity: Fat Layer exposed    Instrument(s) used: Scalpel    Bleeding controlled with: Pressure    Estimated blood loss: Minimal    Pre debridement measurements: 2.8 x 2.4 x 0.4 cm    Post procedural pain: mild    Patient tolerated procedure well.      Right  plantar foot wound    Character of Ulcer: smaller    Indication for Debridement: Slough, Exudate, Abnormal wound edge and Abnormal Wound base     Pre debridement measurements: 0.6 cm x 1.2 cm x 0.2 cm    Risks of procedure were discussed with patient. Consent has been signed. Anesthetic: Lidocaine 4% topical cream     Level of Debridement: Subctaneous Tissue     Tissue and/or Material removed: Exudate, Fibrin/ Slough and Subcutaneous    Type of Tissue: Non- Viable      Pre-debridement severity: Fat Layer Exposed     Post debridement severity: Fat Layer exposed    Instrument(s) used: Scalpel    Bleeding controlled with: Pressure    Estimated blood loss: Minimal    Postdebridement measurements: 0.7 cm x 1.3 cm x 0.3 cm    Post procedural pain: mild    Patient tolerated procedure well. Infection: none  Edema: mild edema    Lower Extremity Circulation   Reviewed patient's recent ABIs. Shows decrease from when the     Offloading: Offloading padding to shoes    Assessment:  1. Diabetic plantar heel ulcer left foot with associated infection  2. Diabetic ulcer right foot-plantar 5th met base  3. Diabetic peripheral neuropathy  4. B/l cavus foot deformity    Plan:   -Patient seen and evaluated as noted above.  -Wound debridement performed of both feet. -Fortunately both wounds are improving this week. Right heel is small. Left heel is developing a red granular base, with muscle no longer exposed. -Right foot dressed with endoform, hydrofera blue, gauze dressing.   Left foot dressed with aquacel and a total contact cast.  -Follow-up next week for change of total contact cast.

## 2020-01-07 NOTE — WOUND CARE
01/07/20 1644   Wound Heel Left;Proximal 12/17/19   Date First Assessed/Time First Assessed: 12/17/19 1530   Present on Hospital Admission: Yes  Wound Approximate Age at First Assessment (Weeks): 1 weeks  Primary Wound Type: Diabetic Ulcer  Location: Heel  Wound Location Orientation: Left;Proximal  Evgeny. .. Dressing Type Applied   (aquacel, TCC)   Wound Foot Right;Plantar;Lateral 07/30/19   Date First Assessed/Time First Assessed: 07/30/19 1034   Present on Hospital Admission: Yes  Wound Approximate Age at First Assessment (Weeks): 1 weeks  Primary Wound Type: Diabetic Ulcer  Location: Foot  Wound Location Orientation: Right;Plantar;Late. .. Dressing Type Applied Collagens/cell matrix;Gauze;Gauze wrap (arnie)  (HFBR)       Discharge Condition: Stable     Pain: 0    Ambulatory Status: Walker     Discharge Destination: Home     Transportation: Car    Accompanied by: Family/Caregiver     Discharge instructions reviewed with Patient and copy or written instructions have been provided. All questions/concerns have been addressed at this time.

## 2020-01-07 NOTE — WOUND CARE
01/07/20 1510   Wound Heel Left;Proximal 12/17/19   Date First Assessed/Time First Assessed: 12/17/19 1530   Present on Hospital Admission: Yes  Wound Approximate Age at First Assessment (Weeks): 1 weeks  Primary Wound Type: Diabetic Ulcer  Location: Heel  Wound Location Orientation: Left;Proximal  Evgeyn. .. Dressing Status Removed   Dressing Type Gauze; Foam  (Aquacel; TCC)   Non-staged Wound Description Full thickness   Wound Length (cm) 1.5 cm   Wound Width (cm) 1.5 cm   Wound Depth (cm) 0.2 cm   Wound Surface Area (cm^2) 2.25 cm^2   Wound Volume (cm^3) 0.45 cm^3   Condition of Base Pink;Slough   Condition of Edges Open   Drainage Amount Large   Drainage Color Serosanguinous   Wound Odor None   Sandra-wound Assessment Intact   Cleansing and Cleansing Agents  Normal saline   Wound Foot Right;Plantar;Lateral 07/30/19   Date First Assessed/Time First Assessed: 07/30/19 1034   Present on Hospital Admission: Yes  Wound Approximate Age at First Assessment (Weeks): 1 weeks  Primary Wound Type: Diabetic Ulcer  Location: Foot  Wound Location Orientation: Right;Plantar;Late. ..    Dressing Status Removed   Dressing Type Gauze;Gauze wrap (arnie)  (Endoform: HFBR)   Wound Length (cm) 0.8 cm   Wound Width (cm) 1.2 cm   Wound Depth (cm) 0.3 cm   Wound Surface Area (cm^2) 0.96 cm^2   Wound Volume (cm^3) 0.29 cm^3   Condition of Base Pink   Condition of Edges Calloused   Undermining (cm) 0.2 cm   Direction of Undermining 10 o'clock  (to 1 o'clock deepest at 1 o'clock)   Drainage Amount Moderate   Drainage Color Serosanguinous   Wound Odor None   Sandra-wound Assessment Intact   Cleansing and Cleansing Agents  Normal saline   Wound Foot Left;Plantar   Date First Assessed/Time First Assessed: 06/21/19 0857   Present on Hospital Admission: Yes  Primary Wound Type: Diabetic  Location: Foot  Wound Location Orientation: Left;Plantar   Dressing Status Removed   Dressing Type Foam  (Aquacel: TCC; Gauze)   Wound Length (cm) 2.7 cm   Wound Width (cm) 2.3 cm   Wound Depth (cm) 0.3 cm   Wound Surface Area (cm^2) 6.21 cm^2   Wound Volume (cm^3) 1.86 cm^3   Condition of Base Pink;Slough   Condition of Edges Calloused   Undermining (cm) 0.6 cm   Direction of Undermining 1 o'clock  (to 5 o'clock)   Drainage Amount Large   Drainage Color Serosanguinous   Wound Odor None   Sandra-wound Assessment Maceration   Cleansing and Cleansing Agents  Normal saline               Visit Vitals  /89 (BP 1 Location: Left arm, BP Patient Position: Sitting)   Pulse 86   Temp 96.6 °F (35.9 °C)   Resp 16     LLE Peripheral Vascular   Capillary Refill: Less than/equal to 3 seconds (01/07/20 1443)  Color: Appropriate for race (01/07/20 1443)  Temperature: Warm (01/07/20 1443)  Sensation: Present (01/07/20 1443)  Pedal Pulse: Palpable (01/07/20 1443)  Circumference of Calf (cm): 44.6 cm (01/07/20 1443)  Location of Measurement (Calf): Mid  (01/07/20 1443)  Circumference of Ankle (cm): 25.3 cm (01/07/20 1443)  Location of Measurement (Ankle): Upper  (01/07/20 1443)  RLE Peripheral Vascular   Capillary Refill: Less than/equal to 3 seconds (01/07/20 1443)  Color: Appropriate for race (01/07/20 1443)  Temperature: Warm (01/07/20 1443)  Sensation: Present (01/07/20 1443)  Pedal Pulse: Palpable (01/07/20 1443)  Circumference of Calf (cm): 44 cm (01/07/20 1443)  Location of Measurement (Calf): Mid  (01/07/20 1443)  Circumference of Ankle (cm): 23.5 cm (01/07/20 1443)  Location of Measurement (Ankle): Upper  (01/07/20 1443)

## 2020-01-14 ENCOUNTER — HOSPITAL ENCOUNTER (INPATIENT)
Age: 60
LOS: 6 days | Discharge: HOME | DRG: 603 | End: 2020-01-20
Attending: EMERGENCY MEDICINE | Admitting: INTERNAL MEDICINE
Payer: COMMERCIAL

## 2020-01-14 ENCOUNTER — APPOINTMENT (OUTPATIENT)
Dept: GENERAL RADIOLOGY | Age: 60
DRG: 603 | End: 2020-01-14
Attending: EMERGENCY MEDICINE
Payer: COMMERCIAL

## 2020-01-14 ENCOUNTER — HOSPITAL ENCOUNTER (OUTPATIENT)
Dept: WOUND CARE | Age: 60
Discharge: HOME | End: 2020-01-14
Payer: COMMERCIAL

## 2020-01-14 VITALS
TEMPERATURE: 99.8 F | HEART RATE: 77 BPM | RESPIRATION RATE: 16 BRPM | SYSTOLIC BLOOD PRESSURE: 178 MMHG | DIASTOLIC BLOOD PRESSURE: 75 MMHG

## 2020-01-14 DIAGNOSIS — L03.119 CELLULITIS OF LOWER EXTREMITY, UNSPECIFIED LATERALITY: ICD-10-CM

## 2020-01-14 DIAGNOSIS — A41.9 SEVERE SEPSIS (HCC): Primary | ICD-10-CM

## 2020-01-14 DIAGNOSIS — R65.20 SEVERE SEPSIS (HCC): Primary | ICD-10-CM

## 2020-01-14 DIAGNOSIS — L97.409 DIABETIC ULCER OF HEEL ASSOCIATED WITH TYPE 2 DIABETES MELLITUS, UNSPECIFIED LATERALITY, UNSPECIFIED ULCER STAGE (HCC): ICD-10-CM

## 2020-01-14 DIAGNOSIS — I48.0 PAF (PAROXYSMAL ATRIAL FIBRILLATION) (HCC): ICD-10-CM

## 2020-01-14 DIAGNOSIS — E11.621 DIABETIC ULCER OF HEEL ASSOCIATED WITH TYPE 2 DIABETES MELLITUS, UNSPECIFIED LATERALITY, UNSPECIFIED ULCER STAGE (HCC): ICD-10-CM

## 2020-01-14 DIAGNOSIS — D68.9 COAGULOPATHY (HCC): ICD-10-CM

## 2020-01-14 LAB
ALBUMIN SERPL-MCNC: 3.5 G/DL (ref 3.5–5)
ALBUMIN/GLOB SERPL: 0.7 {RATIO} (ref 1.1–2.2)
ALP SERPL-CCNC: 123 U/L (ref 45–117)
ALT SERPL-CCNC: 18 U/L (ref 12–78)
ANION GAP SERPL CALC-SCNC: 10 MMOL/L (ref 5–15)
AST SERPL-CCNC: 20 U/L (ref 15–37)
BASOPHILS # BLD: 0 K/UL (ref 0–0.1)
BASOPHILS NFR BLD: 0 % (ref 0–1)
BILIRUB SERPL-MCNC: 0.4 MG/DL (ref 0.2–1)
BUN SERPL-MCNC: 27 MG/DL (ref 6–20)
BUN/CREAT SERPL: 13 (ref 12–20)
CALCIUM SERPL-MCNC: 8.8 MG/DL (ref 8.5–10.1)
CHLORIDE SERPL-SCNC: 104 MMOL/L (ref 97–108)
CO2 SERPL-SCNC: 19 MMOL/L (ref 21–32)
CREAT SERPL-MCNC: 2.08 MG/DL (ref 0.55–1.02)
CRP SERPL-MCNC: 8.65 MG/DL (ref 0–0.6)
DIFFERENTIAL METHOD BLD: ABNORMAL
EOSINOPHIL # BLD: 0 K/UL (ref 0–0.4)
EOSINOPHIL NFR BLD: 0 % (ref 0–7)
ERYTHROCYTE [DISTWIDTH] IN BLOOD BY AUTOMATED COUNT: 14.1 % (ref 11.5–14.5)
ERYTHROCYTE [SEDIMENTATION RATE] IN BLOOD: 68 MM/HR (ref 0–30)
GLOBULIN SER CALC-MCNC: 4.9 G/DL (ref 2–4)
GLUCOSE BLD STRIP.AUTO-MCNC: 216 MG/DL (ref 65–100)
GLUCOSE SERPL-MCNC: 291 MG/DL (ref 65–100)
HCT VFR BLD AUTO: 36.5 % (ref 35–47)
HGB BLD-MCNC: 11.6 G/DL (ref 11.5–16)
IMM GRANULOCYTES # BLD AUTO: 0 K/UL (ref 0–0.04)
IMM GRANULOCYTES NFR BLD AUTO: 0 % (ref 0–0.5)
INR PPP: 10.8 (ref 0.9–1.1)
LACTATE BLD-SCNC: 3.05 MMOL/L (ref 0.4–2)
LYMPHOCYTES # BLD: 0.5 K/UL (ref 0.8–3.5)
LYMPHOCYTES NFR BLD: 2 % (ref 12–49)
MAGNESIUM SERPL-MCNC: 2.1 MG/DL (ref 1.6–2.4)
MCH RBC QN AUTO: 29.1 PG (ref 26–34)
MCHC RBC AUTO-ENTMCNC: 31.8 G/DL (ref 30–36.5)
MCV RBC AUTO: 91.7 FL (ref 80–99)
MONOCYTES # BLD: 0.2 K/UL (ref 0–1)
MONOCYTES NFR BLD: 1 % (ref 5–13)
NEUTS BAND NFR BLD MANUAL: 9 %
NEUTS SEG # BLD: 23.6 K/UL (ref 1.8–8)
NEUTS SEG NFR BLD: 88 % (ref 32–75)
NRBC # BLD: 0 K/UL (ref 0–0.01)
NRBC BLD-RTO: 0 PER 100 WBC
PLATELET # BLD AUTO: 254 K/UL (ref 150–400)
PMV BLD AUTO: 10.8 FL (ref 8.9–12.9)
POTASSIUM SERPL-SCNC: 4.6 MMOL/L (ref 3.5–5.1)
PROT SERPL-MCNC: 8.4 G/DL (ref 6.4–8.2)
PROTHROMBIN TIME: 96.9 SEC (ref 9–11.1)
RBC # BLD AUTO: 3.98 M/UL (ref 3.8–5.2)
RBC MORPH BLD: ABNORMAL
SERVICE CMNT-IMP: ABNORMAL
SODIUM SERPL-SCNC: 133 MMOL/L (ref 136–145)
TROPONIN I SERPL-MCNC: <0.05 NG/ML
WBC # BLD AUTO: 24.3 K/UL (ref 3.6–11)

## 2020-01-14 PROCEDURE — 86140 C-REACTIVE PROTEIN: CPT

## 2020-01-14 PROCEDURE — 87040 BLOOD CULTURE FOR BACTERIA: CPT

## 2020-01-14 PROCEDURE — 74011636637 HC RX REV CODE- 636/637: Performed by: INTERNAL MEDICINE

## 2020-01-14 PROCEDURE — 96374 THER/PROPH/DIAG INJ IV PUSH: CPT

## 2020-01-14 PROCEDURE — 0HDNXZZ EXTRACTION OF LEFT FOOT SKIN, EXTERNAL APPROACH: ICD-10-PCS | Performed by: INTERNAL MEDICINE

## 2020-01-14 PROCEDURE — 74011250637 HC RX REV CODE- 250/637: Performed by: INTERNAL MEDICINE

## 2020-01-14 PROCEDURE — 65660000000 HC RM CCU STEPDOWN

## 2020-01-14 PROCEDURE — 80053 COMPREHEN METABOLIC PANEL: CPT

## 2020-01-14 PROCEDURE — 11042 DBRDMT SUBQ TIS 1ST 20SQCM/<: CPT

## 2020-01-14 PROCEDURE — 83735 ASSAY OF MAGNESIUM: CPT

## 2020-01-14 PROCEDURE — 0HDMXZZ EXTRACTION OF RIGHT FOOT SKIN, EXTERNAL APPROACH: ICD-10-PCS | Performed by: INTERNAL MEDICINE

## 2020-01-14 PROCEDURE — 82962 GLUCOSE BLOOD TEST: CPT

## 2020-01-14 PROCEDURE — 83605 ASSAY OF LACTIC ACID: CPT

## 2020-01-14 PROCEDURE — 96375 TX/PRO/DX INJ NEW DRUG ADDON: CPT

## 2020-01-14 PROCEDURE — 99285 EMERGENCY DEPT VISIT HI MDM: CPT

## 2020-01-14 PROCEDURE — 74011250636 HC RX REV CODE- 250/636: Performed by: INTERNAL MEDICINE

## 2020-01-14 PROCEDURE — 36415 COLL VENOUS BLD VENIPUNCTURE: CPT

## 2020-01-14 PROCEDURE — 74011250636 HC RX REV CODE- 250/636: Performed by: EMERGENCY MEDICINE

## 2020-01-14 PROCEDURE — 84484 ASSAY OF TROPONIN QUANT: CPT

## 2020-01-14 PROCEDURE — 85652 RBC SED RATE AUTOMATED: CPT

## 2020-01-14 PROCEDURE — 85025 COMPLETE CBC W/AUTO DIFF WBC: CPT

## 2020-01-14 PROCEDURE — 74011250637 HC RX REV CODE- 250/637: Performed by: EMERGENCY MEDICINE

## 2020-01-14 PROCEDURE — 73630 X-RAY EXAM OF FOOT: CPT

## 2020-01-14 PROCEDURE — 71045 X-RAY EXAM CHEST 1 VIEW: CPT

## 2020-01-14 PROCEDURE — 85610 PROTHROMBIN TIME: CPT

## 2020-01-14 RX ORDER — ACETAMINOPHEN 500 MG
1000 TABLET ORAL ONCE
Status: COMPLETED | OUTPATIENT
Start: 2020-01-14 | End: 2020-01-14

## 2020-01-14 RX ORDER — HYDRALAZINE HYDROCHLORIDE 50 MG/1
100 TABLET, FILM COATED ORAL EVERY 8 HOURS
Status: DISCONTINUED | OUTPATIENT
Start: 2020-01-14 | End: 2020-01-20 | Stop reason: HOSPADM

## 2020-01-14 RX ORDER — ACETAMINOPHEN 325 MG/1
650 TABLET ORAL
Status: DISCONTINUED | OUTPATIENT
Start: 2020-01-14 | End: 2020-01-20 | Stop reason: HOSPADM

## 2020-01-14 RX ORDER — DEXTROSE MONOHYDRATE 100 MG/ML
0-250 INJECTION, SOLUTION INTRAVENOUS AS NEEDED
Status: DISCONTINUED | OUTPATIENT
Start: 2020-01-14 | End: 2020-01-20 | Stop reason: HOSPADM

## 2020-01-14 RX ORDER — BUMETANIDE 1 MG/1
2 TABLET ORAL DAILY
Status: DISCONTINUED | OUTPATIENT
Start: 2020-01-15 | End: 2020-01-20 | Stop reason: HOSPADM

## 2020-01-14 RX ORDER — FENTANYL CITRATE 50 UG/ML
100 INJECTION, SOLUTION INTRAMUSCULAR; INTRAVENOUS
Status: COMPLETED | OUTPATIENT
Start: 2020-01-14 | End: 2020-01-14

## 2020-01-14 RX ORDER — SODIUM CHLORIDE 0.9 % (FLUSH) 0.9 %
5-40 SYRINGE (ML) INJECTION AS NEEDED
Status: DISCONTINUED | OUTPATIENT
Start: 2020-01-14 | End: 2020-01-20 | Stop reason: HOSPADM

## 2020-01-14 RX ORDER — BUSPIRONE HYDROCHLORIDE 5 MG/1
10 TABLET ORAL 2 TIMES DAILY
Status: DISCONTINUED | OUTPATIENT
Start: 2020-01-14 | End: 2020-01-20 | Stop reason: HOSPADM

## 2020-01-14 RX ORDER — METOPROLOL SUCCINATE 50 MG/1
100 TABLET, EXTENDED RELEASE ORAL DAILY
Status: DISCONTINUED | OUTPATIENT
Start: 2020-01-15 | End: 2020-01-20 | Stop reason: HOSPADM

## 2020-01-14 RX ORDER — METRONIDAZOLE 500 MG/100ML
500 INJECTION, SOLUTION INTRAVENOUS EVERY 12 HOURS
Status: DISCONTINUED | OUTPATIENT
Start: 2020-01-14 | End: 2020-01-19

## 2020-01-14 RX ORDER — INSULIN GLARGINE 100 [IU]/ML
15 INJECTION, SOLUTION SUBCUTANEOUS
Status: DISCONTINUED | OUTPATIENT
Start: 2020-01-14 | End: 2020-01-20 | Stop reason: HOSPADM

## 2020-01-14 RX ORDER — MAGNESIUM SULFATE 100 %
4 CRYSTALS MISCELLANEOUS AS NEEDED
Status: DISCONTINUED | OUTPATIENT
Start: 2020-01-14 | End: 2020-01-20 | Stop reason: HOSPADM

## 2020-01-14 RX ORDER — CLONIDINE HYDROCHLORIDE 0.1 MG/1
0.3 TABLET ORAL
Status: DISCONTINUED | OUTPATIENT
Start: 2020-01-14 | End: 2020-01-20 | Stop reason: HOSPADM

## 2020-01-14 RX ORDER — SODIUM CHLORIDE 0.9 % (FLUSH) 0.9 %
5-40 SYRINGE (ML) INJECTION EVERY 8 HOURS
Status: DISCONTINUED | OUTPATIENT
Start: 2020-01-14 | End: 2020-01-20 | Stop reason: HOSPADM

## 2020-01-14 RX ORDER — INSULIN LISPRO 100 [IU]/ML
INJECTION, SOLUTION INTRAVENOUS; SUBCUTANEOUS
Status: DISCONTINUED | OUTPATIENT
Start: 2020-01-14 | End: 2020-01-20 | Stop reason: HOSPADM

## 2020-01-14 RX ORDER — ONDANSETRON 2 MG/ML
4 INJECTION INTRAMUSCULAR; INTRAVENOUS
Status: COMPLETED | OUTPATIENT
Start: 2020-01-14 | End: 2020-01-14

## 2020-01-14 RX ORDER — ONDANSETRON 2 MG/ML
4 INJECTION INTRAMUSCULAR; INTRAVENOUS
Status: DISCONTINUED | OUTPATIENT
Start: 2020-01-14 | End: 2020-01-20 | Stop reason: HOSPADM

## 2020-01-14 RX ADMIN — Medication 10 ML: at 22:23

## 2020-01-14 RX ADMIN — VANCOMYCIN HYDROCHLORIDE 2000 MG: 10 INJECTION, POWDER, LYOPHILIZED, FOR SOLUTION INTRAVENOUS at 20:29

## 2020-01-14 RX ADMIN — SODIUM CHLORIDE 500 ML: 900 INJECTION, SOLUTION INTRAVENOUS at 18:23

## 2020-01-14 RX ADMIN — INSULIN LISPRO 3 UNITS: 100 INJECTION, SOLUTION INTRAVENOUS; SUBCUTANEOUS at 22:45

## 2020-01-14 RX ADMIN — CLONIDINE HYDROCHLORIDE 0.3 MG: 0.1 TABLET ORAL at 22:18

## 2020-01-14 RX ADMIN — HYDRALAZINE HYDROCHLORIDE 100 MG: 50 TABLET, FILM COATED ORAL at 22:19

## 2020-01-14 RX ADMIN — FENTANYL CITRATE 100 MCG: 50 INJECTION, SOLUTION INTRAMUSCULAR; INTRAVENOUS at 18:24

## 2020-01-14 RX ADMIN — ONDANSETRON 4 MG: 2 INJECTION INTRAMUSCULAR; INTRAVENOUS at 18:24

## 2020-01-14 RX ADMIN — INSULIN GLARGINE 15 UNITS: 100 INJECTION, SOLUTION SUBCUTANEOUS at 22:19

## 2020-01-14 RX ADMIN — ACETAMINOPHEN 1000 MG: 500 TABLET ORAL at 18:24

## 2020-01-14 RX ADMIN — METRONIDAZOLE 500 MG: 500 INJECTION, SOLUTION INTRAVENOUS at 22:29

## 2020-01-14 NOTE — ED NOTES
After administration of fentanyl pt spo2 decreased to 85% RA. Pt placed on 3lpm and encouraged to take deep breaths in and out of nose. spo2 increased to 99%

## 2020-01-14 NOTE — PROGRESS NOTES
Patient presents to wound clinic for: Scott Calles is a 60 y.o. female who presents for wound care of bilateral plantar foot ulcers. Patient currently being treated by Dr. Audra Castro for a wound of the left breast, and was referred to me for further offloading and wound care recommendations of the bilateral heel ulcers. The ulcer of the plantar left heel occurred first and patient was placed in a heel offloading shoe. She then subsequently developed an ulceration of the plantar 5th met base of the right foot. She has been wearing her new diabetic shoes without any issues. Denies f/c/n/v/d. No new issues since last week. No issues over the past week with the TCC to the LLE. However, she notes the recurrent cellulitis of the right thigh and calf returned this morning and that she has been experiencing fever and chills today. Nursing notes that her temperature today is > 99 F and that normally she has been around 80 F at previous appointments. Pertinent Medical History: 
Past Medical History:  
Diagnosis Date  Acquired lymphedema Right arm  Arrhythmia  Asthma  Atrial fibrillation ([facility] Dr. Christal Zambrano and Dr. [facility])  Cancer ([facility] bilateral breast  
 Chronic kidney disease  Diabetes mellitus type II   
 Dizziness  Essential hypertension  Hyperlipidemia  Hypertension  Long term (current) use of anticoagulants  Morbid obesity ([facility] 3/14/2012  Osteoarthritis  Pacemaker  Sick sinus syndrome ([facility] Past Surgical History:  
Procedure Laterality Date  ABDOMEN SURGERY PROC UNLISTED    
 gastric bypass  GASTRIC BYPASS,OBESE<150CM SAW-EN-Y  7/08  
 [facility]  HX AFIB ABLATION    
 HX CARPAL TUNNEL RELEASE [address] RIGHT MODIFIED RADICAL   
 HX MOHS PROCEDURES  1995  
 right  HX ORTHOPAEDIC    
 ight knee surgery  HX PACEMAKER    
  IR INSERT TUNL CVC W PORT OVER 5 YEARS    
 LAMINECTOMY,CERVICAL  1994  
 NEEDLE BIOPSY LIVER,W OTHR [address]  8.21.07  
 [place] [address] AND 1994  [place] TRABECULOPLASTY BY LASER SURGERY    
 US GUIDE ASP OVARIAN CYST ABD APPROACH  8.21.07  
 RIGHT Prior to Admission medications Medication Sig Start Date End Date Taking? Authorizing Provider  
warfarin (COUMADIN) 4 mg tablet Take 1 tablet on Mon and Thurs and a half tablet the other 5 days. 12/12/19  Yes Tiny Chandler MD  
potassium chloride SR (KLOR-CON 10) 10 mEq tablet Take 10 mEq by mouth daily. Indications: Patient states she intends to take this medication untl she discusses with Dr. Fran Franklin on 12-12-19. Yes Provider, Historical  
insulin glargine (LANTUS U-100 INSULIN) 100 unit/mL injection Inject 20 units daily 11/10/19  Yes Tiny Chandler MD  
hydrALAZINE (APRESOLINE) 100 mg tablet TAKE ONE TABLET BY MOUTH THREE TIMES A DAY 10/21/19  Yes Tiny Chandler MD  
bumetanide (BUMEX) 1 mg tablet Take 2 mg by mouth daily. Yes Provider, Historical  
cloNIDine HCl (CATAPRES) 0.3 mg tablet Take 0.6 mg by mouth nightly. Yes Provider, Historical  
doxazosin (CARDURA) 4 mg tablet TAKE ONE TABLET BY MOUTH ONCE NIGHTLY 6/14/19  Yes Tiny Chandler MD  
busPIRone (BUSPAR) 10 mg tablet TAKE ONE TABLET BY MOUTH TWICE A DAY 5/24/19  Yes Tiny Chandler MD  
cholecalciferol, VITAMIN D3, (VITAMIN D3) 5,000 unit tab tablet Take 5,000 Units by mouth daily. Yes Provider, Historical  
vitamin A 8,000 unit capsule Take 8,000 Units by mouth daily. Yes Provider, Historical  
cyanocobalamin (VITAMIN B-12) 1,000 mcg sublingual tablet Take 1,000 mcg by mouth daily.    Yes Provider, Historical  
sertraline (ZOLOFT) 50 mg tablet TAKE ONE AND ONE-HALF (1 & 1/2) TABLET BY MOUTH DAILY 8/22/19   Tiny Chandler MD  
metoprolol succinate (TOPROL-XL) 100 mg tablet TAKE TWO TABLETS BY MOUTH DAILY 7/12/19   Monika Esqueda MD  
metolazone (ZAROXOLYN) 5 mg tablet Take 1 Tab by mouth daily as needed (take if develop increased LE edema, weight gain > 5 pounds. ). 4/10/14   Daniel Mcbride NP  
insulin lispro (HUMALOG) 100 unit/mL kwikpen 2 units with dinner for sugars over 200 1/3/14   Monika Esqueda MD  
 
Allergies Allergen Reactions  Ace Inhibitors Cough  Amlodipine Swelling  Avapro [Irbesartan] Unable to Obtain  Bactrim [Sulfamethoxazole-Trimethoprim] Other (comments) Sore mouth  Captopril Cough  Carvedilol Diarrhea [address]  Morphine Nausea and Vomiting and Swelling  Penicillins Hives  Pravachol [Pravastatin] Nausea Only  Seafood [Shellfish Containing Products] Hives  Singulair [Montelukast] Other (comments)  
  palpitations Family History Problem Relation Age of Onset  Cancer Mother  Heart Disease Mother  Alcohol abuse Father  Diabetes Brother  Hypertension Brother Social History Socioeconomic History  Marital status:  Spouse name: Not on file  Number of children: Not on file  Years of education: Not on file  Highest education level: Not on file Occupational History  Not on file Social Needs  Financial resource strain: Not on file  Food insecurity:  
  Worry: Not on file Inability: Not on file  Transportation needs:  
  Medical: Not on file Non-medical: Not on file Tobacco Use  Smoking status: Never Smoker  Smokeless tobacco: Never Used Substance and Sexual Activity  Alcohol use: Yes Comment: rare  Drug use: No  
 Sexual activity: Not on file Lifestyle  Physical activity:  
  Days per week: Not on file Minutes per session: Not on file  Stress: Not on file Relationships  Social connections:  
  Talks on phone: Not on file Gets together: Not on file Attends Denominational service: Not on file Active member of club or organization: Not on file Attends meetings of clubs or organizations: Not on file Relationship status: Not on file  Intimate partner violence:  
  Fear of current or ex partner: Not on file Emotionally abused: Not on file Physically abused: Not on file Forced sexual activity: Not on file Other Topics Concern  Not on file Social History Narrative  Not on file Vitals:  
 01/14/20 1423 BP: 178/75 Pulse: 77 Resp: 16 Temp: 99.8 °F (37.7 °C) Review of Systems: 
 
Gen: No fever, chills, malaise, weight loss/gain. Heent: No headache, rhinorrhea, epistaxis, ear pain, hearing loss, sinus pain, neck pain/stiffness, sore throat. Heart: No chest pain, palpitations, CHOW, pnd, or orthopnea. Resp: No cough, hemoptysis, wheezing and shortness of breath. GI: No nausea, vomiting, diarrhea, constipation, melena or hematochezia. : No urinary obstruction, dysuria or hematuria. Derm: see below Musc/skeletal: no bone or joint complains. Vasc: No edema, cyanosis or claudication. Endo: No heat/cold intolerance, no polyuria,polydipsia or polyphagia. Neuro: No unilateral weakness, numbness, tingling. No seizures. Heme: No easy bruising or bleeding. Wound Description: 
Refer to nursing notes for measurements. Ulcer Debridement Left plantar foot wound Character of Ulcer: Improved Indication for Debridement: Slough, Exudate, Abnormal wound edge and Abnormal Wound base Pre debridement measurements: 2.5 x 2.2 x 0.3 cm Risks of procedure were discussed with patient. Consent has been signed. Anesthetic: Lidocaine 4% topical cream  
 
Level of Debridement: Subctaneous Tissue , muscule Tissue and/or Material removed: Exudate, Fibrin/ Slough and Subcutaneous, Type of Tissue: Non- Viable Pre-debridement severity: Fat Layer Exposed Post debridement severity: Fat Layer exposed Instrument(s) used: Scalpel Bleeding controlled with: Pressure Estimated blood loss: Minimal 
 
Pre debridement measurements: 2.6 x 2.3 x 0.4 cm Post procedural pain: mild Patient tolerated procedure well. Right  plantar foot wound Character of Ulcer: smaller Indication for Debridement: Slough, Exudate, Abnormal wound edge and Abnormal Wound base Pre debridement measurements: 0.7 cm x 1 cm x 0.1 cm Risks of procedure were discussed with patient. Consent has been signed. Anesthetic: Lidocaine 4% topical cream  
 
Level of Debridement: Subctaneous Tissue Tissue and/or Material removed: Exudate, Fibrin/ Slough and Subcutaneous Type of Tissue: Non- Viable Pre-debridement severity: Fat Layer Exposed Post debridement severity: Fat Layer exposed Instrument(s) used: Scalpel Bleeding controlled with: Pressure Estimated blood loss: Minimal 
 
Postdebridement measurements: 0.8 cm x 1.1 cm x 0.2 cm Post procedural pain: mild Patient tolerated procedure well. Infection: none Edema: mild edema Lower Extremity Circulation Reviewed patient's recent ABIs. Shows decrease from when the  
 
Offloading: Offloading padding to shoes Assessment: 1. Diabetic plantar heel ulcer left foot with associated infection 2. Diabetic ulcer right foot-plantar 5th met base 3. Diabetic peripheral neuropathy 4. B/l cavus foot deformity 5. Right thigh and lower leg cellulitis with low grade fever Plan:  
-Recommend that patient proceed to the ED for further evaluation of significant RLE cellulitis accompanied by low-grade fever with chills. 
-Patient seen and evaluated as noted above. 
-Wound debridement performed of both feet. -Fortunately both wounds are improving this week. Right heel is small.   Left heel is developing a red granular base, with muscle no longer exposed. 
-As patient is going to the ED for further evaluation both feet were dressed with endoform, hydrofera blue, gauze dressing. Will defer TCC today. Patient instructed to stay off feet as much as possible. 
-Follow-up next week. Will consider reapplying TCC at that time.

## 2020-01-14 NOTE — WOUND CARE
01/14/20 1443 Wound Heel Left;Proximal 12/17/19 Date First Assessed/Time First Assessed: 12/17/19 1530   Present on Hospital Admission: Yes  Wound Approximate Age at First Assessment (Weeks): 1 weeks  Primary Wound Type: Diabetic Ulcer  Location: Heel  Wound Location Orientation: Left;Proximal  Evgeny. .. Dressing Status Removed Dressing Type Aquacel;Gauze; Foam 
(TCC) Non-staged Wound Description Full thickness Wound Length (cm) 0.4 cm Wound Width (cm) 1 cm Wound Depth (cm) 0.2 cm Wound Surface Area (cm^2) 0.4 cm^2 Wound Volume (cm^3) 0.08 cm^3 Condition of Base Pink Condition of Edges Open Drainage Amount Large Drainage Color Serosanguinous Wound Odor None Sandra-wound Assessment Intact Cleansing and Cleansing Agents  Soap and water;Normal saline Wound Foot Left;Plantar Date First Assessed/Time First Assessed: 06/21/19 0857   Present on Hospital Admission: Yes  Primary Wound Type: Diabetic  Location: Foot  Wound Location Orientation: Left;Plantar Dressing Status Removed Dressing Type Aquacel;Gauze; Foam 
(TCC) Wound Length (cm) 2.5 cm Wound Width (cm) 2.2 cm Wound Depth (cm) 0.3 cm Wound Surface Area (cm^2) 5.5 cm^2 Wound Volume (cm^3) 1.65 cm^3 Condition of Base Pink Condition of Edges Calloused; Open Drainage Amount Large Drainage Color Serosanguinous Wound Odor Mild Sandra-wound Assessment Maceration Cleansing and Cleansing Agents  Soap and water;Normal saline Wound Foot Right;Plantar;Lateral 07/30/19 Date First Assessed/Time First Assessed: 07/30/19 1034   Present on Hospital Admission: Yes  Wound Approximate Age at First Assessment (Weeks): 1 weeks  Primary Wound Type: Diabetic Ulcer  Location: Foot  Wound Location Orientation: Right;Plantar;Late. .. Dressing Status Removed Dressing Type Gauze;Gauze wrap (arnie); Special tape (comment) Wound Length (cm) 0.7 cm Wound Width (cm) 1 cm Wound Depth (cm) 0.1 cm  
 Wound Surface Area (cm^2) 0.7 cm^2 Wound Volume (cm^3) 0.07 cm^3 Condition of Base Pink Condition of Edges Calloused; Open Drainage Amount Moderate Drainage Color Serosanguinous Wound Odor None Sandra-wound Assessment Intact Cleansing and Cleansing Agents  Soap and water;Normal saline Visit Vitals /75 Pulse 77 Temp 99.8 °F (37.7 °C) Resp 16 LLE Peripheral Vascular Capillary Refill: Less than/equal to 3 seconds (01/14/20 1442) Color: Appropriate for race (01/14/20 1442) Temperature: Warm (01/14/20 1442) Sensation: Present (01/14/20 1442) Pedal Pulse: Palpable (01/14/20 1442) RLE Peripheral Vascular Capillary Refill: Less than/equal to 3 seconds (01/14/20 1442) Color: Red (01/14/20 1442) Temperature: Warm (01/14/20 1442) Sensation: Present (01/14/20 1442) Pedal Pulse: Palpable (01/14/20 1442)

## 2020-01-14 NOTE — WOUND CARE
01/14/20 1524 Wound Heel Left;Proximal 12/17/19 Date First Assessed/Time First Assessed: 12/17/19 1530   Present on Hospital Admission: Yes  Wound Approximate Age at First Assessment (Weeks): 1 weeks  Primary Wound Type: Diabetic Ulcer  Location: Heel  Wound Location Orientation: Left;Proximal  Evgeny. .. Dressing Type Applied  
(Endoform, HFBR, G, RG) Wound Foot Left;Plantar Date First Assessed/Time First Assessed: 06/21/19 0857   Present on Hospital Admission: Yes  Primary Wound Type: Diabetic  Location: Foot  Wound Location Orientation: Left;Plantar Dressing Type Applied  
(Endoform, HFBR, G, RG) Wound Foot Right;Plantar;Lateral 07/30/19 Date First Assessed/Time First Assessed: 07/30/19 1034   Present on Hospital Admission: Yes  Wound Approximate Age at First Assessment (Weeks): 1 weeks  Primary Wound Type: Diabetic Ulcer  Location: Foot  Wound Location Orientation: Right;Plantar;Late. .. Dressing Type Applied  
(Endoform, HFBR, G, RG) Discharge Condition: Stable Pain: 0 Ambulatory Status: Thai Crespo Discharge Destination: ER 
 
Transportation: Car Accompanied by: Patient/Caregiver Discharge instructions reviewed with Patient and Family/Caregiver  and copy or written instructions have been provided. All questions/concerns have been addressed at this time.

## 2020-01-14 NOTE — ED NOTES
Dr. Ava De Luna made aware has s/s of sepsis with temp 80, rr 27, and known source of infection. No additional orders at this time.

## 2020-01-14 NOTE — DISCHARGE INSTRUCTIONS
Discharge Instructions for  [address]  Telephone: [phone]    NAME:  Edwina Francisco OF BIRTH:  1960  MEDICAL RECORD NUMBER:  [id number]  DATE:  1/14/2020    Wound Cleansing:   Do not scrub or use excessive force. Cleanse wound prior to applying a clean dressing with:  [x] Normal Saline [] Keep Wound Dry in Shower    [] Wound Cleanser   [] Cleanse wound with Mild Soap & Water  [] May Shower at Discharge   [] Other:      Topical Treatments:  Do not apply lotions, creams, or ointments to wound bed unless directed. [] Apply moisturizing lotion to skin surrounding the wound prior to dressing change.  [] Apply antifungal ointment to skin surrounding the wound prior to dressing change.  [] Apply thin film of moisture barrier ointment to skin immediately around wound. [] Other:       Dressings:           Wound Location right and left foot wounds   [x] Apply Primary Dressing:       [] MediHoney Gel [] Alginate with Silver [] Alginate   [x] Collagen ENDOFORM, HFBR, GAUZE RG TAPE   [] Collagen with Silver   [] Santyl with Moisten saline gauze     [] Hydrocolloid   [] MediHoney Alginate [] Foam with Silver   [] Foam   [] Hydrofera Blue    [] Mepilex Border    [] Moisten with Saline [] Hydrogel [] Mepitel  [] Betadine moist gauze   [] Bactroban/Mupirocin [] Polysporin  [] Other:    [] Pack wound loosely with  [] Iodoform   [] Plain Packing  [] Other   [x] Cover and Secure with:     [x] Gauze [] Tejinder [] Kerlix   [] Ace Wrap [x] Cover Roll Tape [] ABD [] Exudry    [] Other:    Avoid contact of tape with skin.   [x] Change dressing: [] Daily    [x] Every Other Day [] Three times per week   [] Once a week [] Do Not Change Dressing   [] Other:     Edema Control:  Apply: [] Compression Stocking []Right Leg []Left Leg   [] Tubigrip []Right Leg Double Layer []Left Leg Double Layer       []Right Leg Single Layer []Left Leg Single Layer   [] SpandaGrip []Right Leg  []Left Leg      []Low compression 5-10 mm/Hg      []Medium compression 10-20 mm/Hg     []High compression  20-30 mm/Hg   every morning immediately when getting up should be applied to affected leg(s) from mid foot to knee making sure to cover the heel. Remove every night before going to bed. [] Elevate leg(s) above the level of the heart when sitting. [] Avoid prolonged standing in one place. [] Elevate arm/hand above the level of the heart []RightArm []LeftArm    Off-Loading:   [x] Off-loading when [x] walking  [] in bed [] sitting  [] Total non-weight bearing  [] Right Leg  [] Left Leg   [] Assistive Device [] Walker [] Cane  [] Wheelchair  [] Crutches   [x] Surgical shoe    [] Podus Boot(s)   [] Foam Boot(s)  [] Roll About    [] Cast Boot [] CROW Boot  [] Other:    Contact Cast:  Apply: [] Total Contact Cast Applied in Clinic []RightLeg []Left Leg   [] Do not get cast wet. Contact center or go to emergency room if there is a foul odor or becomes uncomfortable due to feeling tight or swelling. Do not use objects inside of cast to scratch.       Dietary:  [x] Diet as tolerated: [x] Diabetic Diet: Low carb no sugar [] No Added Salt:  [] Increase Protein: [] Other:   Activity: TO REPORT TO [facility] ED FOR EVAL AND TREATMENT OF REDNESS OF LEFT LEG, ELEVATED TEMP    [] Activity as tolerated:  [] Patient has no activity restrictions     [] Strict Bedrest: [] Remain off Work:     [] May return to full duty work:                                   [] Return to work with restrictions:             Return Appointment:  [] Wound and dressing supply provider:   [] ECF or Home Healthcare:  [] Wound Assessment: [] Physician or NP scheduled for Wound Assessment:   [] Return Appointment: With MD  in  1  Week(s)  [] Ordered tests:      Electronically signed Roselyn King RN on 1/14/2020 at 3:18 PM     [facility] Information: Should you experience any significant changes in your wound(s) or have questions about your wound care, please contact the [address] [facility] 8:00 am - 4:30. If you need help with your wound outside these hours and cannot wait until we are again available, contact your PCP or go to the hospital emergency room. PLEASE NOTE: IF YOU ARE UNABLE TO OBTAIN WOUND SUPPLIES, CONTINUE TO USE THE SUPPLIES YOU HAVE AVAILABLE UNTIL YOU ARE ABLE TO REACH US. IT IS MOST IMPORTANT TO KEEP THE WOUND COVERED AT ALL TIMES.      Physician Signature:_______________________    Date: ___________ Time:  ____________

## 2020-01-15 DIAGNOSIS — Z79.01 LONG TERM (CURRENT) USE OF ANTICOAGULANTS: ICD-10-CM

## 2020-01-15 DIAGNOSIS — I48.0 PAF (PAROXYSMAL ATRIAL FIBRILLATION) (HCC): ICD-10-CM

## 2020-01-15 LAB
AMORPH CRY URNS QL MICRO: ABNORMAL
ANION GAP SERPL CALC-SCNC: 7 MMOL/L (ref 5–15)
APPEARANCE UR: CLEAR
BACTERIA URNS QL MICRO: NEGATIVE /HPF
BASOPHILS # BLD: 0.1 K/UL (ref 0–0.1)
BASOPHILS NFR BLD: 1 % (ref 0–1)
BILIRUB UR QL CFM: NEGATIVE
BUN SERPL-MCNC: 30 MG/DL (ref 6–20)
BUN/CREAT SERPL: 16 (ref 12–20)
CALCIUM SERPL-MCNC: 8.6 MG/DL (ref 8.5–10.1)
CHLORIDE SERPL-SCNC: 108 MMOL/L (ref 97–108)
CO2 SERPL-SCNC: 20 MMOL/L (ref 21–32)
COLOR UR: ABNORMAL
CREAT SERPL-MCNC: 1.82 MG/DL (ref 0.55–1.02)
DIFFERENTIAL METHOD BLD: ABNORMAL
EOSINOPHIL # BLD: 0 K/UL (ref 0–0.4)
EOSINOPHIL NFR BLD: 0 % (ref 0–7)
EPITH CASTS URNS QL MICRO: ABNORMAL /LPF
ERYTHROCYTE [DISTWIDTH] IN BLOOD BY AUTOMATED COUNT: 14.4 % (ref 11.5–14.5)
GLUCOSE BLD STRIP.AUTO-MCNC: 104 MG/DL (ref 65–100)
GLUCOSE BLD STRIP.AUTO-MCNC: 115 MG/DL (ref 65–100)
GLUCOSE BLD STRIP.AUTO-MCNC: 141 MG/DL (ref 65–100)
GLUCOSE BLD STRIP.AUTO-MCNC: 164 MG/DL (ref 65–100)
GLUCOSE SERPL-MCNC: 146 MG/DL (ref 65–100)
GLUCOSE UR STRIP.AUTO-MCNC: 250 MG/DL
HCT VFR BLD AUTO: 31.5 % (ref 35–47)
HGB BLD-MCNC: 10 G/DL (ref 11.5–16)
HGB UR QL STRIP: NEGATIVE
IMM GRANULOCYTES # BLD AUTO: 0.2 K/UL (ref 0–0.04)
IMM GRANULOCYTES NFR BLD AUTO: 1 % (ref 0–0.5)
INR PPP: 9.9 (ref 0.9–1.1)
KETONES UR QL STRIP.AUTO: NEGATIVE MG/DL
LACTATE SERPL-SCNC: 1.3 MMOL/L (ref 0.4–2)
LEUKOCYTE ESTERASE UR QL STRIP.AUTO: NEGATIVE
LYMPHOCYTES # BLD: 0.5 K/UL (ref 0.8–3.5)
LYMPHOCYTES NFR BLD: 3 % (ref 12–49)
MAGNESIUM SERPL-MCNC: 2.1 MG/DL (ref 1.6–2.4)
MCH RBC QN AUTO: 29.5 PG (ref 26–34)
MCHC RBC AUTO-ENTMCNC: 31.7 G/DL (ref 30–36.5)
MCV RBC AUTO: 92.9 FL (ref 80–99)
MONOCYTES # BLD: 0.3 K/UL (ref 0–1)
MONOCYTES NFR BLD: 2 % (ref 5–13)
NEUTS SEG # BLD: 15.1 K/UL (ref 1.8–8)
NEUTS SEG NFR BLD: 94 % (ref 32–75)
NITRITE UR QL STRIP.AUTO: NEGATIVE
NRBC # BLD: 0 K/UL (ref 0–0.01)
NRBC BLD-RTO: 0 PER 100 WBC
PH UR STRIP: 5.5 [PH] (ref 5–8)
PLATELET # BLD AUTO: 208 K/UL (ref 150–400)
PMV BLD AUTO: 10.8 FL (ref 8.9–12.9)
POTASSIUM SERPL-SCNC: 4.3 MMOL/L (ref 3.5–5.1)
PROT UR STRIP-MCNC: 100 MG/DL
PROTHROMBIN TIME: 89.6 SEC (ref 9–11.1)
RBC # BLD AUTO: 3.39 M/UL (ref 3.8–5.2)
RBC #/AREA URNS HPF: ABNORMAL /HPF (ref 0–5)
SERVICE CMNT-IMP: ABNORMAL
SODIUM SERPL-SCNC: 135 MMOL/L (ref 136–145)
SP GR UR REFRACTOMETRY: 1.03 (ref 1–1.03)
UA: UC IF INDICATED,UAUC: ABNORMAL
UROBILINOGEN UR QL STRIP.AUTO: 0.2 EU/DL (ref 0.2–1)
WBC # BLD AUTO: 16.2 K/UL (ref 3.6–11)
WBC URNS QL MICRO: ABNORMAL /HPF (ref 0–4)

## 2020-01-15 PROCEDURE — 74011636637 HC RX REV CODE- 636/637: Performed by: INTERNAL MEDICINE

## 2020-01-15 PROCEDURE — 85025 COMPLETE CBC W/AUTO DIFF WBC: CPT

## 2020-01-15 PROCEDURE — 82962 GLUCOSE BLOOD TEST: CPT

## 2020-01-15 PROCEDURE — 83735 ASSAY OF MAGNESIUM: CPT

## 2020-01-15 PROCEDURE — 74011250637 HC RX REV CODE- 250/637: Performed by: INTERNAL MEDICINE

## 2020-01-15 PROCEDURE — 80048 BASIC METABOLIC PNL TOTAL CA: CPT

## 2020-01-15 PROCEDURE — 65270000029 HC RM PRIVATE

## 2020-01-15 PROCEDURE — 81001 URINALYSIS AUTO W/SCOPE: CPT

## 2020-01-15 PROCEDURE — 83605 ASSAY OF LACTIC ACID: CPT

## 2020-01-15 PROCEDURE — 74011250636 HC RX REV CODE- 250/636: Performed by: INTERNAL MEDICINE

## 2020-01-15 PROCEDURE — 36415 COLL VENOUS BLD VENIPUNCTURE: CPT

## 2020-01-15 PROCEDURE — 74011000258 HC RX REV CODE- 258: Performed by: INTERNAL MEDICINE

## 2020-01-15 PROCEDURE — 74011250637 HC RX REV CODE- 250/637: Performed by: EMERGENCY MEDICINE

## 2020-01-15 PROCEDURE — 85610 PROTHROMBIN TIME: CPT

## 2020-01-15 RX ADMIN — BUSPIRONE HYDROCHLORIDE 10 MG: 5 TABLET ORAL at 17:05

## 2020-01-15 RX ADMIN — BUSPIRONE HYDROCHLORIDE 10 MG: 5 TABLET ORAL at 08:56

## 2020-01-15 RX ADMIN — METOPROLOL SUCCINATE 100 MG: 50 TABLET, EXTENDED RELEASE ORAL at 08:56

## 2020-01-15 RX ADMIN — INSULIN LISPRO 2 UNITS: 100 INJECTION, SOLUTION INTRAVENOUS; SUBCUTANEOUS at 08:56

## 2020-01-15 RX ADMIN — VANCOMYCIN HYDROCHLORIDE 1250 MG: 10 INJECTION, POWDER, LYOPHILIZED, FOR SOLUTION INTRAVENOUS at 21:03

## 2020-01-15 RX ADMIN — ACETAMINOPHEN 650 MG: 325 TABLET ORAL at 03:59

## 2020-01-15 RX ADMIN — BUMETANIDE 2 MG: 1 TABLET ORAL at 08:56

## 2020-01-15 RX ADMIN — CEFEPIME HYDROCHLORIDE 2 G: 2 INJECTION, POWDER, FOR SOLUTION INTRAVENOUS at 09:03

## 2020-01-15 RX ADMIN — METRONIDAZOLE 500 MG: 500 INJECTION, SOLUTION INTRAVENOUS at 09:58

## 2020-01-15 RX ADMIN — INSULIN LISPRO 2 UNITS: 100 INJECTION, SOLUTION INTRAVENOUS; SUBCUTANEOUS at 17:00

## 2020-01-15 RX ADMIN — Medication 10 ML: at 06:00

## 2020-01-15 RX ADMIN — PHYTONADIONE 5 MG: 10 INJECTION, EMULSION INTRAMUSCULAR; INTRAVENOUS; SUBCUTANEOUS at 09:10

## 2020-01-15 RX ADMIN — Medication 10 ML: at 06:06

## 2020-01-15 RX ADMIN — Medication 10 ML: at 13:30

## 2020-01-15 RX ADMIN — HYDRALAZINE HYDROCHLORIDE 100 MG: 50 TABLET, FILM COATED ORAL at 06:05

## 2020-01-15 RX ADMIN — HYDRALAZINE HYDROCHLORIDE 100 MG: 50 TABLET, FILM COATED ORAL at 13:30

## 2020-01-15 RX ADMIN — ACETAMINOPHEN 650 MG: 325 TABLET ORAL at 18:34

## 2020-01-15 RX ADMIN — CEFEPIME HYDROCHLORIDE 2 G: 2 INJECTION, POWDER, FOR SOLUTION INTRAVENOUS at 00:40

## 2020-01-15 RX ADMIN — Medication 10 ML: at 09:12

## 2020-01-15 NOTE — PROGRESS NOTES
Pharmacy Daily Dosing of Warfarin Indication: a fib Goal INR: 2-3 PTA Warfarin Dose: 4 mg Mon, Thurs and 2 mg on Tues, Wed, Fri, Sat, Sun 
 
Concurrent anticoagulants: none Concurrent antiplatelet: none Major Interacting Medications Drugs that may increase INR: metronidazole Drugs that may decrease INR: n/a Conditions that may increase/decrease INR (CHF, C. diff, cirrhosis, thyroid disorder, hypoalbuminemia):  
 
Labs: 
Recent Labs  
  01/14/20 
1858 01/14/20 
1809 INR 10.8*  --   
HGB  --  11.6 PLT  --  254 SGOT  --  20  
TBILI  --  0.4 ALB  --  3.5 Impression/Plan: HOLD warfarin for INR 10.8 Daily INR 
CBC w/o diff QOD Pharmacy will follow daily and adjust the dose as appropriate. Thanks Alvina Rg, PHARMD

## 2020-01-15 NOTE — PROGRESS NOTES
Patient was not given vitamin K overnight as ordered. Messaged pharmacy to send via tube. Vitamin K given at 0910.

## 2020-01-15 NOTE — PROGRESS NOTES
Pt has MRI Unsafe pacemaker. Pt can not have MRI. Relayed message to nurse Maya Hernandez on floor. MRI to be cancelled

## 2020-01-15 NOTE — PROGRESS NOTES
TEE PLAN: DC home w/ family and outpt svcs Reason for Admission:   Pt is a 61 yo female admitted upon recommendation of podiatrist after being seen for treatment of bilateral diabetic foot ulcers. Admitted for treatment of sepsis and R LE cellulitis. RRAT Score:    32 Resources/supports as identified by patient/family:   Pt lives w/ spouse - both are retired. They relocated to [place] in past couple of years but drive back to [place] so they can keep same physicians. Top Challenges facing patient (as identified by patient/family and CM): Finances/Medication cost?      No concerns voiced Transportation? Pt and  are both drivers Support system or lack thereof? Good support system Living arrangements? 1 story home Self-care/ADLs/Cognition? Pt is indep for mobility and ADLs at baseline - uses a RW occasionally. A & O Current Advanced Directive/Advance Care Plan:  Not on File. Full Code.  Leticia Porter is surrogate medical decision maker. Plan for utilizing home health:    Pt has not had [facility] in past - has always gone to podiatrist office or [address] [facility] for svcs. Transition of Care Plan:    
Awaiting Podiatry consult Anticipate that pt will return to schedule with Podiatrist, Dr. Amanda Walsh, and Josselyn Souza at dc PCP  F/u appmt. NN at PCP office, Sophia Francis, notified of High RRAT score pt being admitted via email. CM will follow to assist with any other identified dc needs. Care Management Interventions PCP Verified by CM: Yes 
Palliative Care Criteria Met (RRAT>21 & CHF Dx)?: No 
Mode of Transport at Discharge: Other (see comment) Transition of Care Consult (CM Consult): Discharge Planning Discharge Durable Medical Equipment: No 
Physical Therapy Consult: No 
Occupational Therapy Consult: No 
 Speech Therapy Consult: No 
Current Support Network: Lives with Spouse Confirm Follow Up Transport: Family Discharge Location Discharge Placement: Home with outpatient services(Wound [address]) Alicia Fournier MSW

## 2020-01-15 NOTE — PROGRESS NOTES
MRI called nurse to get screen form filled out. Patient states she cannot have an MRI due to pacemaker. MRI notified.

## 2020-01-15 NOTE — PROGRESS NOTES
Tylenol administered for elevated temp of 101.7 @ 0359 Pt denies having any other needs at this time. Will continue to monitor for needs.

## 2020-01-15 NOTE — PROGRESS NOTES
Pharmacy Automatic Renal Dosing Protocol - Antimicrobials Indication for Antimicrobials: SSTI Current Regimen of Each Antimicrobial: 
Vancomycin 2000 mg IV x 1 dose, (Start Date ; Day # 1) Cefepime 2 gm IV every 12 hours (Start Date ; Day # 1) Metronidazole 500 mg IV every 12 hours (Start Date ; Day # 1) Previous Antimicrobial Therapy: 
 
Goal Level: VANCOMYCIN TROUGH GOAL RANGE Vancomycin Trough: 15 - 20 mcg/mL  (AUC: 400 - 600 mg/hr/Liter/day) Date Dose & Interval Measured (mcg/mL) Extrapolated (mcg/mL) Date & time of next level:  
 
Significant Cultures:  
 paired blood: pending Radiology / Imaging results: (X-ray, CT scan or MRI):  
 
Labs: 
Recent Labs  
  20 
1809 CREA 2.08* BUN 27* WBC 24.3* Temp (24hrs), Av.7 °F (38.2 °C), Min:99.3 °F (37.4 °C), Max:103 °F (39.4 °C) Paralysis, amputations, malnutrition: not noted Creatinine Clearance (mL/min) or Dialysis: 31 ml/min Impression/Plan:  
 Vancomycin 2000 mg IV x 1 dose, followed by 1250 mg IV every 24 hours for a predicted trough of 16 mcg/ml () Cefepime 2 gm IV every 12 hours Metronidazole 500 mg IV every 12 hours BMP daily Antimicrobial stop date TBD Pharmacy will follow daily and adjust medications as appropriate for renal function and/or serum levels. Thank you, Isabell Griffin, RICOD

## 2020-01-15 NOTE — PROGRESS NOTES
Pharmacy Medication Reconciliation The patient was interviewed regarding current PTA medication list, use and drug allergies. The patient was questioned regarding use of any other inhalers, topical products, over the counter medications, herbal medications, vitamin products or ophthalmic/nasal/otic medication use. Allergy Update: Ace inhibitors; Amlodipine; Prem Payment; Bactrim [sulfamethoxazole-trimethoprim]; Captopril; Carvedilol; Fish containing products; Morphine; Penicillins; Pravachol [pravastatin]; Seafood [shellfish containing products]; and Singulair [montelukast] Recommendations/Findings: The following amendments were made to the [facility] [address] [facility]  
1) Additions: tylenol PM every evening 2) Deletions: none 3) Changes: none Pertinent Findings:  
- Warfarin - patient was discharged on 2 mg daily last admission. Dose was increased back to normal after levofloxacin completed. INR from 1/2/20 was therapeutic. - potassium - this was discontinued on previous discharge, but PCP kept patient on this due to scheduled diuretic use. - metolazone - she said she does not use this often 
- only new medication to add to the list is tylenol PM every evening. No new herbals or other medications. 
 
-Clarified PTA med list with patient, [facility] pharmacy. PTA medication list was corrected to the following:  
 
Prior to Admission Medications Prescriptions Last Dose Informant Taking?  
acetaminophen 500 mg tab 500 mg, diphenhydrAMINE 25 mg cap 25 mg 1/13/2020  Yes Sig: Take 2 Doses by mouth nightly. bumetanide (BUMEX) 1 mg tablet 1/14/2020 Self No  
Sig: Take 2 mg by mouth daily. busPIRone (BUSPAR) 10 mg tablet 1/14/2020 Self No  
Sig: TAKE ONE TABLET BY MOUTH TWICE A DAY cholecalciferol, VITAMIN D3, (VITAMIN D3) 5,000 unit tab tablet  Self No  
Sig: Take 5,000 Units by mouth daily.   
cloNIDine HCl (CATAPRES) 0.3 mg tablet 1/14/2020 Self No  
 Sig: Take 0.6 mg by mouth nightly. cyanocobalamin (VITAMIN B-12) 1,000 mcg sublingual tablet 2020 Self No  
Sig: Take 1,000 mcg by mouth daily. doxazosin (CARDURA) 4 mg tablet 2020 Self No  
Sig: TAKE ONE TABLET BY MOUTH ONCE NIGHTLY  
hydrALAZINE (APRESOLINE) 100 mg tablet 2020  No  
Sig: TAKE ONE TABLET BY MOUTH THREE TIMES A DAY  
insulin glargine (LANTUS U-100 INSULIN) 100 unit/mL injection 2020  No  
Sig: Inject 20 units daily  
insulin lispro (HUMALOG) 100 unit/mL kwikpen Unknown at Unknown time Self No  
Si units with dinner for sugars over 200  
metolazone (ZAROXOLYN) 5 mg tablet  Self No  
Sig: Take 1 Tab by mouth daily as needed (take if develop increased LE edema, weight gain > 5 pounds. ). metoprolol succinate (TOPROL-XL) 100 mg tablet 2020 Self No  
Sig: TAKE TWO TABLETS BY MOUTH DAILY potassium chloride SR (KLOR-CON 10) 10 mEq tablet 2020  No  
Sig: Take 10 mEq by mouth daily. Indications: Patient states she intends to take this medication untl she discusses with Dr. Demetrius Randle on 19.  
sertraline (ZOLOFT) 50 mg tablet 2020  No  
Sig: TAKE ONE AND ONE-HALF (1 & 1/2) TABLET BY MOUTH DAILY  
vitamin A 8,000 unit capsule 2020 Self No  
Sig: Take 8,000 Units by mouth daily. warfarin (COUMADIN) 4 mg tablet 2020  No  
Sig: Take 1 tablet on Mon and Thurs and a half tablet the other 5 days. Facility-Administered Medications: None Thank you, Sang Cee, PHARMD

## 2020-01-15 NOTE — H&P
Hospitalist Admission Note NAME: Alma Law :  1960 MRN:  [id number] Date/Time:  2020 8:23 PM 
 
Patient PCP: Arpit Simons MD 
________________________________________________________________________ My assessment of this patient's clinical condition and my plan of care is as follows. Assessment / Plan: 
Right lower extremity cellulitis POA B/L foot  Diabetic Ulcers POA Sepsis POA secondary to above 
 
-Presented with rapid spread of redness on right leg. 
-Received 500 cc of bolus. Avoid further bolus due to h/o CKD and diastolic HF 
-Lactic acid 3, will repeat it 
-Place on IV antibiotics, cefepime, vancomycin and Flagyl 
-Podiatry consult 
-Likely source of cellulitis is right foot diabetic/neuropathic ulcer 
-Patient also has left foot ulcer, will get MRI to rule out osteo- 
Loistine Peeling sent Paroxysmal A. Fib Supratherapeutic INR Continue metoprolol On warfarin at home Hold warfarin Will ask pharmacy to manage warfarin dosing Monitor INR, no signs of bleeding Give Vitamin K 5 mg once, as Flagyl can also raise INR Prolonged QTC 
 as per monitor, will get EKG 
-Avoid QTC prolonging drugs -Monitor electrolytes, replace if needed, added mag. DM II Lantus 20 at night, at home. [place] SSI at home A1c 6.7 in November 
 
-Continue Lantus 15 Units here 
-SSI AC/HS History of bilateral breast cancer Essential hypertension Hyperlipidemia Resume home meds, 
Asked Pharmacy to reconcile meds CKD IV, aware Surrogate Decision Gila Ellsworth   Code status: Full Prophylaxis: Coumadin Recommended Disposition: Home w/Family  
  
  
  
 
Subjective: CHIEF COMPLAINT: Right Foot redness HISTORY OF PRESENT ILLNESS:    
Lidya Castellanos is a 60 y.o.    female who presents with past medical history of chronic diabetic foot ulcer on both legs, paroxysmal A. fib, CKD stage IV, hypertension, hyperlipidemia presented to emergency department, sent from podiatry office for right leg cellulitis. Patient has a chronic wound on left heel and right heel which are being managed from podiatry. But since last 2 days she has been having redness and warmth of right leg up to thigh. Has been having chills, also had a fever of 103 in ED over here. She went to see her podiatry and she sent here. She reports that she feels better since she came here. Denies any recent trauma, recent any antibiotic use. We were asked to admit for work up and evaluation of the above problems. Past Medical History:  
Diagnosis Date  Acquired lymphedema Right arm  Arrhythmia  Asthma  Atrial fibrillation ([facility] Dr. Brittni Toro and Dr. Gutierrez alves [facility])  Cancer ([facility] [address] [facility] bilateral breast  
 Chronic kidney disease  Diabetes mellitus type II   
 Dizziness  Essential hypertension  Hyperlipidemia  Hypertension  Long term (current) use of anticoagulants  Morbid obesity ([facility] [address] 3/14/2012  Osteoarthritis  Pacemaker  Sick sinus syndrome ([facility] [address] Past Surgical History:  
Procedure Laterality Date  ABDOMEN SURGERY PROC UNLISTED    
 gastric bypass  GASTRIC BYPASS,OBESE<150CM SAW-EN-Y  7/08  
 [facility]  HX AFIB ABLATION    
 HX CARPAL TUNNEL RELEASE [address] [id number] [address] RIGHT MODIFIED RADICAL   
 HX MOHS PROCEDURES  1995  
 right  HX ORTHOPAEDIC    
 ight knee surgery  HX PACEMAKER    
 IR INSERT TUNL CVC W PORT OVER 5 YEARS    
 LAMINECTOMY,CERVICAL  1994  
 NEEDLE BIOPSY LIVER,W OTHR [address]  8.21.07  
 [place] [address] AND 1994  [place] TRABECULOPLASTY BY LASER SURGERY    
 US GUIDE ASP OVARIAN CYST ABD APPROACH  8.21.07  
 RIGHT Social History Tobacco Use  Smoking status: Never Smoker  Smokeless tobacco: Never Used Substance Use Topics  Alcohol use: Yes Comment: rare Family History Problem Relation Age of Onset  Cancer Mother  Heart Disease Mother  Alcohol abuse Father  Diabetes Brother  Hypertension Brother Allergies Allergen Reactions  Ace Inhibitors Cough  Amlodipine Swelling  Avapro [Irbesartan] Unable to Obtain  Bactrim [Sulfamethoxazole-Trimethoprim] Other (comments) Sore mouth  Captopril Cough  Carvedilol Diarrhea [address]  Morphine Nausea and Vomiting and Swelling  Penicillins Hives  Pravachol [Pravastatin] Nausea Only  Seafood [Shellfish Containing Products] Hives  Singulair [Montelukast] Other (comments)  
  palpitations Prior to Admission medications Medication Sig Start Date End Date Taking? Authorizing Provider  
warfarin (COUMADIN) 4 mg tablet Take 1 tablet on Mon and Thurs and a half tablet the other 5 days. 12/12/19   Urvashi Abrams MD  
potassium chloride SR (KLOR-CON 10) 10 mEq tablet Take 10 mEq by mouth daily. Indications: Patient states she intends to take this medication untl she discusses with Dr. Amelie Olivera on 12-12-19. Provider, Historical  
insulin glargine (LANTUS U-100 INSULIN) 100 unit/mL injection Inject 20 units daily 11/10/19   Urvashi Abrams MD  
hydrALAZINE (APRESOLINE) 100 mg tablet TAKE ONE TABLET BY MOUTH THREE TIMES A DAY 10/21/19   Urvashi Abrams MD  
sertraline (ZOLOFT) 50 mg tablet TAKE ONE AND ONE-HALF (1 & 1/2) TABLET BY MOUTH DAILY 8/22/19   Urvashi Abrams MD  
bumetanide (BUMEX) 1 mg tablet Take 2 mg by mouth daily. Provider, Historical  
cloNIDine HCl (CATAPRES) 0.3 mg tablet Take 0.6 mg by mouth nightly.     Provider, Historical  
metoprolol succinate (TOPROL-XL) 100 mg tablet TAKE TWO TABLETS BY MOUTH DAILY 7/12/19   Urvashi Abrams MD  
 doxazosin (CARDURA) 4 mg tablet TAKE ONE TABLET BY MOUTH ONCE NIGHTLY 6/14/19   Renee Mcintosh MD  
busPIRone (BUSPAR) 10 mg tablet TAKE ONE TABLET BY MOUTH TWICE A DAY 5/24/19   Renee Mcintosh MD  
metolazone (ZAROXOLYN) 5 mg tablet Take 1 Tab by mouth daily as needed (take if develop increased LE edema, weight gain > 5 pounds. ). 4/10/14   Vani Reynoso NP  
cholecalciferol, VITAMIN D3, (VITAMIN D3) 5,000 unit tab tablet Take 5,000 Units by mouth daily. Provider, Historical  
vitamin A 8,000 unit capsule Take 8,000 Units by mouth daily. Provider, Historical  
insulin lispro (HUMALOG) 100 unit/mL kwikpen 2 units with dinner for sugars over 200 1/3/14   Renee Mcintosh MD  
cyanocobalamin (VITAMIN B-12) 1,000 mcg sublingual tablet Take 1,000 mcg by mouth daily. Provider, Historical  
 
 
REVIEW OF SYSTEMS:    
I am not able to complete the review of systems because: The patient is intubated and sedated The patient has altered mental status due to his acute medical problems The patient has baseline aphasia from prior stroke(s) The patient has baseline dementia and is not reliable historian The patient is in acute medical distress and unable to provide information Total of 12 systems reviewed as follows:   
   POSITIVE= underlined text  Negative = text not underlined General:  fever, chills, sweats, generalized weakness, weight loss/gain,  
   loss of appetite Eyes:    blurred vision, eye pain, loss of vision, double vision ENT:    rhinorrhea, pharyngitis Respiratory:   cough, sputum production, SOB, CHOW, wheezing, pleuritic pain  
Cardiology:   chest pain, palpitations, orthopnea, PND, edema, syncope Gastrointestinal:  abdominal pain , N/V, diarrhea, dysphagia, constipation, bleeding Genitourinary:  frequency, urgency, dysuria, hematuria, incontinence Muskuloskeletal :  arthralgia, myalgia, back pain Hematology:  easy bruising, nose or gum bleeding, lymphadenopathy Dermatological: rash, ulceration, pruritis, color change / jaundice Endocrine:   hot flashes or polydipsia Neurological:  headache, dizziness, confusion, focal weakness, paresthesia, Speech difficulties, memory loss, gait difficulty Psychological: Feelings of anxiety, depression, agitation Objective: VITALS:   
Visit Vitals /55 Pulse 72 Temp (!) 103 °F (39.4 °C) Resp 22 Ht 5' 10\" (1.778 m) Wt 113.5 kg (250 lb 3.6 oz) SpO2 99% BMI 35.90 kg/m² PHYSICAL EXAM: 
 
General:    Alert, cooperative, no distress, appears stated age. HEENT: Atraumatic, anicteric sclerae, pink conjunctivae No oral ulcers, mucosa moist, throat clear, dentition fair Neck:  Supple, symmetrical,  thyroid: non tender Lungs:   Clear to auscultation bilaterally. No Wheezing or Rhonchi. No rales. Chest wall:  No tenderness  No Accessory muscle use. Heart:   Regular  rhythm,  No  murmur   No edema Abdomen:   Soft, non-tender. Not distended. Bowel sounds normal 
Extremities: No cyanosis. No clubbing Right foot cellulitis on right leg. Also left foot/heel ulcer Psych:  Good insight. Not depressed. Not anxious or agitated. Neurologic: EOMs intact. No facial asymmetry. No aphasia or slurred speech. Symmetrical strength, Sensation grossly intact. Alert and oriented X 4.  
 
 
 
 
 
 
 
 
 
 
_______________________________________________________________________ Care Plan discussed with: 
  Comments Patient x Family RN x Care Manager Consultant:     
_______________________________________________________________________ Expected  Disposition:  
Home with Family x HH/PT/OT/RN   
SNF/LTC   
RENE   
________________________________________________________________________ TOTAL TIME:  40 Minutes Critical Care Provided     Minutes non procedure based Comments >50% of visit spent in counseling and coordination of care  Chart review Discussion with patient and/or family and questions answered  
 
________________________________________________________________________ Caleb Lovelace MD 
 
Procedures: see electronic medical records for all procedures/Xrays and details which were not copied into this note but were reviewed prior to creation of Plan. LAB DATA REVIEWED:   
Recent Results (from the past 24 hour(s)) CBC WITH AUTOMATED DIFF Collection Time: 01/14/20  6:09 PM  
Result Value Ref Range WBC 24.3 (H) 3.6 - 11.0 K/uL  
 RBC 3.98 3.80 - 5.20 M/uL  
 HGB 11.6 11.5 - 16.0 g/dL HCT 36.5 35.0 - 47.0 % MCV 91.7 80.0 - 99.0 FL  
 MCH 29.1 26.0 - 34.0 PG  
 MCHC 31.8 30.0 - 36.5 g/dL  
 RDW 14.1 11.5 - 14.5 % PLATELET 517 268 - 122 K/uL MPV 10.8 8.9 - 12.9 FL  
 NRBC 0.0 0  WBC ABSOLUTE NRBC 0.00 0.00 - 0.01 K/uL NEUTROPHILS 88 (H) 32 - 75 % BAND NEUTROPHILS 9 % LYMPHOCYTES 2 (L) 12 - 49 % MONOCYTES 1 (L) 5 - 13 % EOSINOPHILS 0 0 - 7 % BASOPHILS 0 0 - 1 % IMMATURE GRANULOCYTES 0 0.0 - 0.5 % ABS. NEUTROPHILS 23.6 (H) 1.8 - 8.0 K/UL  
 ABS. LYMPHOCYTES 0.5 (L) 0.8 - 3.5 K/UL  
 ABS. MONOCYTES 0.2 0.0 - 1.0 K/UL  
 ABS. EOSINOPHILS 0.0 0.0 - 0.4 K/UL  
 ABS. BASOPHILS 0.0 0.0 - 0.1 K/UL  
 ABS. IMM. GRANS. 0.0 0.00 - 0.04 K/UL  
 DF AUTOMATED    
 RBC COMMENTS NORMOCYTIC, NORMOCHROMIC METABOLIC PANEL, COMPREHENSIVE Collection Time: 01/14/20  6:09 PM  
Result Value Ref Range Sodium 133 (L) 136 - 145 mmol/L Potassium 4.6 3.5 - 5.1 mmol/L Chloride 104 97 - 108 mmol/L  
 CO2 19 (L) 21 - 32 mmol/L Anion gap 10 5 - 15 mmol/L Glucose 291 (H) 65 - 100 mg/dL BUN 27 (H) 6 - 20 MG/DL Creatinine 2.08 (H) 0.55 - 1.02 MG/DL  
 BUN/Creatinine ratio 13 12 - 20 GFR est AA 29 (L) >60 ml/min/1.73m2 GFR est non-AA 24 (L) >60 ml/min/1.73m2  Calcium 8.8 8.5 - 10.1 MG/DL  
 Bilirubin, total 0.4 0.2 - 1.0 MG/DL  
 ALT (SGPT) 18 12 - 78 U/L  
 AST (SGOT) 20 15 - 37 U/L Alk. phosphatase 123 (H) 45 - 117 U/L Protein, total 8.4 (H) 6.4 - 8.2 g/dL Albumin 3.5 3.5 - 5.0 g/dL Globulin 4.9 (H) 2.0 - 4.0 g/dL A-G Ratio 0.7 (L) 1.1 - 2.2 SED RATE (ESR) Collection Time: 01/14/20  6:09 PM  
Result Value Ref Range Sed rate, automated 68 (H) 0 - 30 mm/hr POC LACTIC ACID Collection Time: 01/14/20  6:20 PM  
Result Value Ref Range Lactic Acid (POC) 3.05 (HH) 0.40 - 2.00 mmol/L PROTHROMBIN TIME + INR Collection Time: 01/14/20  6:58 PM  
Result Value Ref Range INR 10.8 (HH) 0.9 - 1.1 Prothrombin time 96.9 (H) 9.0 - 11.1 sec TROPONIN I Collection Time: 01/14/20  6:58 PM  
Result Value Ref Range Troponin-I, Qt. <0.05 <0.05 ng/mL

## 2020-01-15 NOTE — PROGRESS NOTES
Hospitalist Progress Note NAME: Jose Cardona :  1960 MRN:  [id number] Interim Hospital Summary: 60 y.o. female whom presented on 2020 with Assessment / Plan: 
 
Right lower extremity cellulitis POA B/L foot  Diabetic Ulcers POA Sepsis POA secondary to above 
-PCN allergy limits abx choices 
-continue empiric Vanco, cefepime and flagyl. Rash looks like erysipelas 
-follow up on cultures. Keep leg elevated 
-MRI left LE pending  
-Podiatry consult. Source of cellulitis likely diabetic/neuropathic foot ulcers 
  
Paroxysmal A. Fib Supratherapeutic INR Continue metoprolol Holding warfarin. Vit K given. Follow INR 
  
Prolonged QTC 
 as per monitor 
-Avoid QTC prolonging drugs -Monitor electrolytes, replace if needed, added mag. 
  
DM II 
A1c 6.7 in November 
-Continue Lantus 15 Units with SSI AC/HS  
  
History of bilateral breast cancer Essential hypertension Hyperlipidemia Continue home meds 
  
CKD IV 
  
  
Surrogate Decision Valerie De Anda   Code status: Full Prophylaxis: Coumadin Recommended Disposition: Home w/Family  
 
30.0 - 39.9 Obese / Body mass index is 35.9 kg/m². Subjective: Chief Complaint / Reason for Physician Visit Follow up of cellulitis, PAF, supratherapeutic INR Chart reviewed in detail. Discussed with RN events overnight. Review of Systems: 
Symptom Y/N Comments  Symptom Y/N Comments Fever/Chills    Chest Pain Poor Appetite    Edema Cough    Abdominal Pain Sputum    Joint Pain SOB/CHOW    Pruritis/Rash Nausea/vomit    Tolerating PT/OT Diarrhea    Tolerating Diet Constipation    Other Could NOT obtain due to:   
 
PO intake: No data found. Objective: VITALS:  
Last 24hrs VS reviewed since prior progress note. Most recent are: 
Patient Vitals for the past 24 hrs: 
 Temp Pulse Resp BP SpO2 01/15/20 0814 98.2 °F (36.8 °C) 60 18 140/65 95 % 01/15/20 0608 99.8 °F (37.7 °C)      
01/15/20 0459 100.2 °F (37.9 °C)      
01/15/20 0453     96 % 01/15/20 0359 (!) 101.7 °F (38.7 °C) 65 18 142/56 98 % 01/15/20 0012 99.3 °F (37.4 °C) 67 18 127/52 100 % 01/14/20 2259 98.9 °F (37.2 °C) 64 18 147/53 100 % 01/14/20 2218  65  147/53   
01/14/20 2200  65 22 147/53 99 % 01/14/20 2100  66 23 144/49 98 % 01/14/20 2000  64 23 148/45 100 % 01/14/20 1900  69 26 151/59 99 % 01/14/20 1849  72 22  99 % 01/14/20 1847    159/55   
01/14/20 1833  72 19 162/53 94 % 01/14/20 1800 (!) 103 °F (39.4 °C)      
01/14/20 1644 99.3 °F (37.4 °C) 85 18 175/71 99 % No intake or output data in the 24 hours ending 01/15/20 1127 PHYSICAL EXAM: 
General: WD, WN. Alert, cooperative, no acute distress   
EENT:  EOMI. Anicteric sclerae. MMM Resp:  CTA bilaterally, no wheezing or rales. No accessory muscle use CV:  Regular  rhythm,  No edema GI:  Soft, Non distended, Non tender.  +Bowel sounds Neurologic:  Alert and oriented X 3, normal speech, Psych:   Good insight. Not anxious nor agitated Skin:  (+) left leg rash, violaceous, tender, streaking up inner leg. Borders marked with sharpie Reviewed most current lab test results and cultures  YES Reviewed most current radiology test results   YES Review and summation of old records today    NO Reviewed patient's current orders and MAR    YES 
PMH/SH reviewed - no change compared to H&P 
________________________________________________________________________ Care Plan discussed with: 
  Comments Patient x Family RN x Care Manager Consultant Multidiciplinary team rounds were held today with , nursing, pharmacist and clinical coordinator. Patient's plan of care was discussed; medications were reviewed and discharge planning was addressed. ________________________________________________________________________ Total NON critical care TIME:  20   Minutes Total CRITICAL CARE TIME Spent:   Minutes non procedure based Comments >50% of visit spent in counseling and coordination of care x This includes time during multidisciplinary rounds if indicated above  
________________________________________________________________________ Robert Freeman MD  
 
Procedures: see electronic medical records for all procedures/Xrays and details which were not copied into this note but were reviewed prior to creation of Plan. LABS: 
I reviewed today's most current labs and imaging studies. Pertinent labs include: 
Recent Labs  
  01/15/20 
0502 01/14/20 
1809 WBC 16.2* 24.3* HGB 10.0* 11.6 HCT 31.5* 36.5  254 Recent Labs  
  01/15/20 
0502 01/14/20 
1858 01/14/20 
1809 *  --  133* K 4.3  --  4.6   --  104 CO2 20*  --  19* *  --  291* BUN 30*  --  27* CREA 1.82*  --  2.08* CA 8.6  --  8.8 MG 2.1 2.1  --   
ALB  --   --  3.5 TBILI  --   --  0.4 SGOT  --   --  20 ALT  --   --  18 INR 9.9* 10.8*  --

## 2020-01-15 NOTE — ED NOTES
Report given to [facility] on receiving unit. Pt awaiting transport at this time.

## 2020-01-15 NOTE — PROGRESS NOTES
Bedside and Verbal shift change report given to Michelle (oncoming nurse) by Xiomara (offgoing nurse). Report included the following information SBAR, Kardex, ED Summary, Procedure Summary, Intake/Output, MAR, Recent Results and Med Rec Status.

## 2020-01-15 NOTE — PROGRESS NOTES
Pt arrived on unit from ED on a stretcher alert and orientated able to make needs known to staff. Respiration even and unlabored on 2L of 02. Denies having any needs at this time. Will continue to monitor for needs.

## 2020-01-15 NOTE — PROGRESS NOTES
-Please complete MRI History and Safety Screening Form for this patient using [employer] only under Orders Requiring a Screening Form: 
 
Example: 
Orders Requiring a Screening Form Procedure Order Status Form Status MRI EXAM Active In Progress - Answer all questions completely, including Weight, Surgery, Allergy, and Implant History. - Document MUST be \"eSigned\" using a \"Signature Pad\" by the person completing form, the patient, and the RN or MD completing form with the patient. - Patient cannot be scanned until this form is completed and reviewed in MRI to ensure patient is SAFE and eligible for MRI. - CALL MRI when this has been successfully completed at [phone]. 
- This must be done under [employer]. Do not use the Nursing Flowsheets.

## 2020-01-15 NOTE — ED PROVIDER NOTES
EMERGENCY DEPARTMENT HISTORY AND PHYSICAL EXAM 
 
 
Date: 1/14/2020 Patient Name: Rajan Garcia History of Presenting Illness Chief Complaint Patient presents with  Referral / Consult Wound care physician sent her over for cellulitis of the R leg. History Provided By: Patient HPI: Rajan Garcia, 60 y.o. female presents to the ED with cc of leg wounds and redness. Pt with hx of DM and foot ulcers. Pt was seen at wound care clinic and referred to the ED for evaluation and admission. Pt has 2 chronic wounds to the soles of her feet Left larger than the right, but today per pt the left wound was starting to look worse with increase in drainage. The wound on right was looking better but pt was noted to have redness, warmth and streaking of the right leg. Pt has had worsening pain 10/10 in left and right leg, sharp and aching. Subjective fevers and chills. No cough or cold symptoms. No abd pain, n/v/d. No dysuria or hematuria. Pt denies CP or SOA. There are no other complaints, changes, or physical findings at this time. PCP: Elaine Isaacs MD 
 
Current Facility-Administered Medications on File Prior to Encounter Medication Dose Route Frequency Provider Last Rate Last Dose  [COMPLETED] lidocaine (ALOCANE) 4 % topical gel   Topical NOW Marjorie Arthur DPM      
 
Current Outpatient Medications on File Prior to Encounter Medication Sig Dispense Refill  warfarin (COUMADIN) 4 mg tablet Take 1 tablet on Mon and Thurs and a half tablet the other 5 days. 30 Tab 5  potassium chloride SR (KLOR-CON 10) 10 mEq tablet Take 10 mEq by mouth daily. Indications: Patient states she intends to take this medication untl she discusses with Dr. Zoya Alcala on 12-12-19.     
 insulin glargine (LANTUS U-100 INSULIN) 100 unit/mL injection Inject 20 units daily 10 mL 5  
 hydrALAZINE (APRESOLINE) 100 mg tablet TAKE ONE TABLET BY MOUTH THREE TIMES A DAY 90 Tab 11  
  sertraline (ZOLOFT) 50 mg tablet TAKE ONE AND ONE-HALF (1 & 1/2) TABLET BY MOUTH DAILY 45 Tab 5  
 bumetanide (BUMEX) 1 mg tablet Take 2 mg by mouth daily.  cloNIDine HCl (CATAPRES) 0.3 mg tablet Take 0.6 mg by mouth nightly.  metoprolol succinate (TOPROL-XL) 100 mg tablet TAKE TWO TABLETS BY MOUTH DAILY 60 Tab 9  
 doxazosin (CARDURA) 4 mg tablet TAKE ONE TABLET BY MOUTH ONCE NIGHTLY 30 Tab 10  
 busPIRone (BUSPAR) 10 mg tablet TAKE ONE TABLET BY MOUTH TWICE A DAY 60 Tab 10  
 metolazone (ZAROXOLYN) 5 mg tablet Take 1 Tab by mouth daily as needed (take if develop increased LE edema, weight gain > 5 pounds. ). 30 Tab 1  cholecalciferol, VITAMIN D3, (VITAMIN D3) 5,000 unit tab tablet Take 5,000 Units by mouth daily.  vitamin A 8,000 unit capsule Take 8,000 Units by mouth daily.  insulin lispro (HUMALOG) 100 unit/mL kwikpen 2 units with dinner for sugars over 200 1 Package 0  
 cyanocobalamin (VITAMIN B-12) 1,000 mcg sublingual tablet Take 1,000 mcg by mouth daily. Past History Past Medical History: 
Past Medical History:  
Diagnosis Date  Acquired lymphedema Right arm  Arrhythmia  Asthma  Atrial fibrillation ([facility] [address] Dr. Saba Avina and Dr. Damon Harrell [facility])  Cancer ([facility] [address] bilateral breast  
 Chronic kidney disease  Diabetes mellitus type II   
 Dizziness  Essential hypertension  Hyperlipidemia  Hypertension  Long term (current) use of anticoagulants  Morbid obesity ([facility] [address] 3/14/2012  Osteoarthritis  Pacemaker  Sick sinus syndrome ([facility] [address] Past Surgical History: 
Past Surgical History:  
Procedure Laterality Date  ABDOMEN SURGERY PROC UNLISTED    
 gastric bypass  GASTRIC BYPASS,OBESE<150CM SAW-EN-Y  7/08  
 gu[place]ito  HX AFIB ABLATION    
 HX CARPAL TUNNEL RELEASE [address] [facility] [address] RIGHT MODIFIED RADICAL [address]  
 right  HX ORTHOPAEDIC    
 ight knee surgery  HX PACEMAKER    
 IR INSERT TUNL CVC W PORT OVER 5 YEARS    
 LAMINECTOMY,CERVICAL  1994  
 NEEDLE BIOPSY LIVER,W OTHR [address]  8.21.07  
 [address] AND 1994  AK TRABECULOPLASTY BY LASER SURGERY    
 US GUIDE ASP OVARIAN CYST ABD APPROACH  8.21.07  
 RIGHT Family History: 
Family History Problem Relation Age of Onset  Cancer Mother  Heart Disease Mother  Alcohol abuse Father  Diabetes Brother  Hypertension Brother Social History: 
Social History Tobacco Use  Smoking status: Never Smoker  Smokeless tobacco: Never Used Substance Use Topics  Alcohol use: Yes Comment: rare  Drug use: No  
 
 
Allergies: Allergies Allergen Reactions  Ace Inhibitors Cough  Amlodipine Swelling  Avapro [Irbesartan] Unable to Obtain  Bactrim [Sulfamethoxazole-Trimethoprim] Other (comments) Sore mouth  Captopril Cough  Carvedilol Diarrhea [address]  Morphine Nausea and Vomiting and Swelling  Penicillins Hives  Pravachol [Pravastatin] Nausea Only  Seafood [Shellfish Containing Products] Hives  Singulair [Montelukast] Other (comments)  
  palpitations Review of Systems Review of Systems Constitutional: Negative. Negative for appetite change, chills, fatigue and fever. HENT: Negative. Negative for congestion, rhinorrhea, sinus pressure and sore throat. Eyes: Negative. Respiratory: Negative. Negative for cough, choking, chest tightness, shortness of breath and wheezing. Cardiovascular: Negative. Negative for chest pain, palpitations and leg swelling. Gastrointestinal: Negative for abdominal pain, constipation, diarrhea, nausea and vomiting. Endocrine: Negative. Genitourinary: Negative. Negative for difficulty urinating, dysuria, flank pain and urgency. Musculoskeletal: Positive for myalgias (latha leg pain). Skin: Positive for wound (Bottom of both feet). Neurological: Negative. Negative for dizziness, speech difficulty, weakness, light-headedness, numbness and headaches. Psychiatric/Behavioral: Negative. All other systems reviewed and are negative. Physical Exam  
Physical Exam 
Vitals signs and nursing note reviewed. Constitutional:   
   General: She is not in acute distress. Appearance: She is well-developed. She is ill-appearing. She is not diaphoretic. Comments: Morbidly obese HENT:  
   Head: Normocephalic and atraumatic. Mouth/Throat:  
   Pharynx: No oropharyngeal exudate. Eyes:  
   Conjunctiva/sclera: Conjunctivae normal.  
   Pupils: Pupils are equal, round, and reactive to light. Neck: Musculoskeletal: Normal range of motion and neck supple. Vascular: No JVD. Trachea: No tracheal deviation. Cardiovascular:  
   Rate and Rhythm: Normal rate and regular rhythm. Heart sounds: Normal heart sounds. No murmur. Pulmonary:  
   Effort: Pulmonary effort is normal. No respiratory distress. Breath sounds: Normal breath sounds. No stridor. No wheezing or rales. Abdominal:  
   General: There is no distension. Palpations: Abdomen is soft. Tenderness: There is no tenderness. There is no guarding or rebound. Musculoskeletal: Normal range of motion. General: No tenderness. Legs: 
 
     Feet: 
 
Skin: 
   General: Skin is warm and dry. Comments: Redness to right leg Neurological:  
   Mental Status: She is alert and oriented to person, place, and time. Cranial Nerves: No cranial nerve deficit. Comments: No gross motor or sensory deficits Psychiatric:     
   Behavior: Behavior normal.  
 
 
 
Diagnostic Study Results Labs - Recent Results (from the past 12 hour(s)) CBC WITH AUTOMATED DIFF  Collection Time: 01/14/20  6:09 PM  
 Result Value Ref Range WBC 24.3 (H) 3.6 - 11.0 K/uL  
 RBC 3.98 3.80 - 5.20 M/uL  
 HGB 11.6 11.5 - 16.0 g/dL HCT 36.5 35.0 - 47.0 % MCV 91.7 80.0 - 99.0 FL  
 MCH 29.1 26.0 - 34.0 PG  
 MCHC 31.8 30.0 - 36.5 g/dL  
 RDW 14.1 11.5 - 14.5 % PLATELET 158 207 - 057 K/uL MPV 10.8 8.9 - 12.9 FL  
 NRBC 0.0 0  WBC ABSOLUTE NRBC 0.00 0.00 - 0.01 K/uL NEUTROPHILS 88 (H) 32 - 75 % BAND NEUTROPHILS 9 % LYMPHOCYTES 2 (L) 12 - 49 % MONOCYTES 1 (L) 5 - 13 % EOSINOPHILS 0 0 - 7 % BASOPHILS 0 0 - 1 % IMMATURE GRANULOCYTES 0 0.0 - 0.5 % ABS. NEUTROPHILS 23.6 (H) 1.8 - 8.0 K/UL  
 ABS. LYMPHOCYTES 0.5 (L) 0.8 - 3.5 K/UL  
 ABS. MONOCYTES 0.2 0.0 - 1.0 K/UL  
 ABS. EOSINOPHILS 0.0 0.0 - 0.4 K/UL  
 ABS. BASOPHILS 0.0 0.0 - 0.1 K/UL  
 ABS. IMM. GRANS. 0.0 0.00 - 0.04 K/UL  
 DF AUTOMATED    
 RBC COMMENTS NORMOCYTIC, NORMOCHROMIC METABOLIC PANEL, COMPREHENSIVE Collection Time: 01/14/20  6:09 PM  
Result Value Ref Range Sodium 133 (L) 136 - 145 mmol/L Potassium 4.6 3.5 - 5.1 mmol/L Chloride 104 97 - 108 mmol/L  
 CO2 19 (L) 21 - 32 mmol/L Anion gap 10 5 - 15 mmol/L Glucose 291 (H) 65 - 100 mg/dL BUN 27 (H) 6 - 20 MG/DL Creatinine 2.08 (H) 0.55 - 1.02 MG/DL  
 BUN/Creatinine ratio 13 12 - 20 GFR est AA 29 (L) >60 ml/min/1.73m2 GFR est non-AA 24 (L) >60 ml/min/1.73m2 Calcium 8.8 8.5 - 10.1 MG/DL Bilirubin, total 0.4 0.2 - 1.0 MG/DL  
 ALT (SGPT) 18 12 - 78 U/L  
 AST (SGOT) 20 15 - 37 U/L Alk. phosphatase 123 (H) 45 - 117 U/L Protein, total 8.4 (H) 6.4 - 8.2 g/dL Albumin 3.5 3.5 - 5.0 g/dL Globulin 4.9 (H) 2.0 - 4.0 g/dL A-G Ratio 0.7 (L) 1.1 - 2.2 SED RATE (ESR) Collection Time: 01/14/20  6:09 PM  
Result Value Ref Range Sed rate, automated 68 (H) 0 - 30 mm/hr C REACTIVE PROTEIN, QT Collection Time: 01/14/20  6:09 PM  
Result Value Ref Range C-Reactive protein 8.65 (H) 0.00 - 0.60 mg/dL POC LACTIC ACID  
 Collection Time: 01/14/20  6:20 PM  
Result Value Ref Range Lactic Acid (POC) 3.05 (HH) 0.40 - 2.00 mmol/L PROTHROMBIN TIME + INR Collection Time: 01/14/20  6:58 PM  
Result Value Ref Range INR 10.8 (HH) 0.9 - 1.1 Prothrombin time 96.9 (H) 9.0 - 11.1 sec TROPONIN I Collection Time: 01/14/20  6:58 PM  
Result Value Ref Range Troponin-I, Qt. <0.05 <0.05 ng/mL MAGNESIUM Collection Time: 01/14/20  6:58 PM  
Result Value Ref Range Magnesium 2.1 1.6 - 2.4 mg/dL GLUCOSE, POC Collection Time: 01/14/20 10:32 PM  
Result Value Ref Range Glucose (POC) 216 (H) 65 - 100 mg/dL Performed by [facility] Radiologic Studies -  
XR CHEST SNGL V Final Result IMPRESSION: No acute findings. XR FOOT RT MIN 3 V Final Result IMPRESSION: Soft tissue ulceration without acute finding otherwise demonstrated. XR FOOT LT MIN 3 V Final Result IMPRESSION: No substantial change in radiographic appearance. MRI LOW EXT LT WO CONT    (Results Pending) CT Results  (Last 48 hours) None CXR Results  (Last 48 hours) 01/14/20 1843  XR CHEST SNGL V Final result Impression:  IMPRESSION: No acute findings. Narrative:  EXAM: XR CHEST SNGL V  
   
INDICATION: Fever, admission COMPARISON: 8/9/2019 FINDINGS: A single AP radiograph of the chest was obtained at 1836 hours. There  
is no pneumothorax or pleural effusion. The lungs are clear. Cardiac size is  
within normal disc. There is no mediastinal or hilar enlargement. Left axillary  
pacemaker with dual leads is again shown. Anterior fixation in the lower  
cervical spine is again demonstrated. Medical Decision Making I am the first provider for this patient. I reviewed the vital signs, available nursing notes, past medical history, past surgical history, family history and social history. Vital Signs-Reviewed the patient's vital signs. Patient Vitals for the past 12 hrs: 
 Temp Pulse Resp BP SpO2  
01/15/20 0012 99.3 °F (37.4 °C) 67 18 127/52 100 % 01/14/20 2259 98.9 °F (37.2 °C) 64 18 147/53 100 % 01/14/20 2218  65  147/53   
01/14/20 2200  65 22 147/53 99 % 01/14/20 2100  66 23 144/49 98 % 01/14/20 2000  64 23 148/45 100 % 01/14/20 1900  69 26 151/59 99 % 01/14/20 1849  72 22  99 % 01/14/20 1847    159/55   
01/14/20 1833  72 19 162/53 94 % 01/14/20 1800 (!) 103 °F (39.4 °C)      
01/14/20 1644 99.3 °F (37.4 °C) 85 18 175/71 99 % Records Reviewed: Nursing Notes, Old Medical Records, Previous Radiology Studies and Previous Laboratory Studies Provider Notes (Medical Decision Making): DDx- Diabetic foot ulcer, cellulitis, osteomyelitis, dehydration, UTI, pneumonia, bacteremia ED Course:  
Initial assessment performed. The patients presenting problems have been discussed, and they are in agreement with the care plan formulated and outlined with them. I have encouraged them to ask questions as they arise throughout their visit. Pt with leukocytosis, elevated lactate, IV Ab started, Pt on coumadin INR 10.8, no active bleeding, no need for FFP at this time. IV Ab started, will discuss with hospitalist for admission. Consult Note: 
Case discussed with Dr. Jacky Simmons, they will see and evaluate pt for admission. Critical Care Time: CRITICAL CARE NOTE : 
 
IMPENDING DETERIORATION -Cardiovascular ASSOCIATED RISK FACTORS - Open wounds of feet with cellulitis, concern possible osteomyelitis, INR elevated MANAGEMENT- Bedside Assessment and Supervision of Care INTERPRETATION -  Xrays, ECG, Blood Pressure, Cardiac Output Measures  and Labs INTERVENTIONS - IV Ab, fluid bolus CASE REVIEW - Hospitalist, Nursing and Family TREATMENT RESPONSE -Stable PERFORMED BY - Self NOTES   : 
I have spent 40 minutes of critical care time involved in lab review, consultations with specialist, family decision- making, bedside attention and documentation. During this entire length of time I was immediately available to the patient . Fanny Tobar,  Disposition: 
Admit to medicine PLAN: 
1. Admit, IV Ab Diagnosis Clinical Impression: 1. Severe sepsis ([address] 2. Cellulitis of lower extremity, unspecified laterality 3. Diabetic ulcer of heel associated with type 2 diabetes mellitus, unspecified laterality, unspecified ulcer stage ([address] 4. Coagulopathy ([address] Attestations: 
 
Fanny Tobar DO Please note that this dictation was completed with [employer], the computer voice recognition software. Quite often unanticipated grammatical, syntax, homophones, and other interpretive errors are inadvertently transcribed by the computer software. Please disregard these errors. Please excuse any errors that have escaped final proofreading. Thank you.

## 2020-01-15 NOTE — PROGRESS NOTES
Pharmacy Daily Dosing of Warfarin Indication: a fib Goal INR: 2-3 PTA Warfarin Dose: 4 mg Mon, Thurs and 2 mg on Tues, Wed, Fri, Sat, Sun 
 
Concurrent anticoagulants: none Concurrent antiplatelet: none Major Interacting Medications ? Drugs that may increase INR: metronidazole ? Drugs that may decrease INR: n/a Conditions that may increase/decrease INR (CHF, C. diff, cirrhosis, thyroid disorder, hypoalbuminemia):  
 
Labs: 
Recent Labs  
  01/15/20 
0502 01/14/20 
1858 01/14/20 
1809 INR 9.9* 10.8*  --   
HGB 10.0*  --  11.6   --  254 SGOT  --   --  20  
TBILI  --   --  0.4 ALB  --   --  3.5 Impression/Plan:  
· HOLD warfarin for INR 9.9 · Vit k dose was delayed, not given until 1/15 AM 
· Daily INR · CBC w/o diff QOD Pharmacy will follow daily and adjust the dose as appropriate. Thanks Tex Kovacs, PHARMD

## 2020-01-16 LAB
ANION GAP SERPL CALC-SCNC: 8 MMOL/L (ref 5–15)
BASOPHILS # BLD: 0.1 K/UL (ref 0–0.1)
BASOPHILS NFR BLD: 1 % (ref 0–1)
BUN SERPL-MCNC: 34 MG/DL (ref 6–20)
BUN/CREAT SERPL: 17 (ref 12–20)
CALCIUM SERPL-MCNC: 9.1 MG/DL (ref 8.5–10.1)
CHLORIDE SERPL-SCNC: 107 MMOL/L (ref 97–108)
CO2 SERPL-SCNC: 21 MMOL/L (ref 21–32)
CREAT SERPL-MCNC: 1.99 MG/DL (ref 0.55–1.02)
DATE LAST DOSE: ABNORMAL
DIFFERENTIAL METHOD BLD: ABNORMAL
EOSINOPHIL # BLD: 0 K/UL (ref 0–0.4)
EOSINOPHIL NFR BLD: 0 % (ref 0–7)
ERYTHROCYTE [DISTWIDTH] IN BLOOD BY AUTOMATED COUNT: 14.6 % (ref 11.5–14.5)
GLUCOSE BLD STRIP.AUTO-MCNC: 134 MG/DL (ref 65–100)
GLUCOSE BLD STRIP.AUTO-MCNC: 135 MG/DL (ref 65–100)
GLUCOSE BLD STRIP.AUTO-MCNC: 140 MG/DL (ref 65–100)
GLUCOSE BLD STRIP.AUTO-MCNC: 148 MG/DL (ref 65–100)
GLUCOSE SERPL-MCNC: 135 MG/DL (ref 65–100)
HCT VFR BLD AUTO: 34.6 % (ref 35–47)
HGB BLD-MCNC: 10.8 G/DL (ref 11.5–16)
IMM GRANULOCYTES # BLD AUTO: 0.1 K/UL (ref 0–0.04)
IMM GRANULOCYTES NFR BLD AUTO: 1 % (ref 0–0.5)
INR PPP: 1.8 (ref 0.9–1.1)
LYMPHOCYTES # BLD: 0.8 K/UL (ref 0.8–3.5)
LYMPHOCYTES NFR BLD: 9 % (ref 12–49)
MAGNESIUM SERPL-MCNC: 2.3 MG/DL (ref 1.6–2.4)
MCH RBC QN AUTO: 29.3 PG (ref 26–34)
MCHC RBC AUTO-ENTMCNC: 31.2 G/DL (ref 30–36.5)
MCV RBC AUTO: 93.8 FL (ref 80–99)
MONOCYTES # BLD: 0.5 K/UL (ref 0–1)
MONOCYTES NFR BLD: 5 % (ref 5–13)
NEUTS SEG # BLD: 7.7 K/UL (ref 1.8–8)
NEUTS SEG NFR BLD: 85 % (ref 32–75)
NRBC # BLD: 0 K/UL (ref 0–0.01)
NRBC BLD-RTO: 0 PER 100 WBC
PLATELET # BLD AUTO: 214 K/UL (ref 150–400)
PMV BLD AUTO: 11.2 FL (ref 8.9–12.9)
POTASSIUM SERPL-SCNC: 3.8 MMOL/L (ref 3.5–5.1)
PROTHROMBIN TIME: 18 SEC (ref 9–11.1)
RBC # BLD AUTO: 3.69 M/UL (ref 3.8–5.2)
REPORTED DOSE,DOSE: ABNORMAL UNITS
REPORTED DOSE/TIME,TMG: 2000
SERVICE CMNT-IMP: ABNORMAL
SODIUM SERPL-SCNC: 136 MMOL/L (ref 136–145)
VANCOMYCIN TROUGH SERPL-MCNC: 20.7 UG/ML (ref 5–10)
WBC # BLD AUTO: 9.1 K/UL (ref 3.6–11)

## 2020-01-16 PROCEDURE — 85025 COMPLETE CBC W/AUTO DIFF WBC: CPT

## 2020-01-16 PROCEDURE — 80202 ASSAY OF VANCOMYCIN: CPT

## 2020-01-16 PROCEDURE — 74011250637 HC RX REV CODE- 250/637: Performed by: INTERNAL MEDICINE

## 2020-01-16 PROCEDURE — 85610 PROTHROMBIN TIME: CPT

## 2020-01-16 PROCEDURE — 36415 COLL VENOUS BLD VENIPUNCTURE: CPT

## 2020-01-16 PROCEDURE — 65270000029 HC RM PRIVATE

## 2020-01-16 PROCEDURE — 74011636637 HC RX REV CODE- 636/637: Performed by: INTERNAL MEDICINE

## 2020-01-16 PROCEDURE — 82962 GLUCOSE BLOOD TEST: CPT

## 2020-01-16 PROCEDURE — 74011250636 HC RX REV CODE- 250/636: Performed by: INTERNAL MEDICINE

## 2020-01-16 PROCEDURE — 80048 BASIC METABOLIC PNL TOTAL CA: CPT

## 2020-01-16 PROCEDURE — 83735 ASSAY OF MAGNESIUM: CPT

## 2020-01-16 PROCEDURE — 74011000258 HC RX REV CODE- 258: Performed by: INTERNAL MEDICINE

## 2020-01-16 RX ORDER — WARFARIN 2 MG/1
2 TABLET ORAL ONCE
Status: COMPLETED | OUTPATIENT
Start: 2020-01-16 | End: 2020-01-16

## 2020-01-16 RX ADMIN — METRONIDAZOLE 500 MG: 500 INJECTION, SOLUTION INTRAVENOUS at 01:48

## 2020-01-16 RX ADMIN — INSULIN GLARGINE 15 UNITS: 100 INJECTION, SOLUTION SUBCUTANEOUS at 00:16

## 2020-01-16 RX ADMIN — Medication 10 ML: at 18:25

## 2020-01-16 RX ADMIN — BUSPIRONE HYDROCHLORIDE 10 MG: 5 TABLET ORAL at 08:48

## 2020-01-16 RX ADMIN — CEFEPIME HYDROCHLORIDE 2 G: 2 INJECTION, POWDER, FOR SOLUTION INTRAVENOUS at 00:16

## 2020-01-16 RX ADMIN — HYDRALAZINE HYDROCHLORIDE 100 MG: 50 TABLET, FILM COATED ORAL at 21:15

## 2020-01-16 RX ADMIN — METRONIDAZOLE 500 MG: 500 INJECTION, SOLUTION INTRAVENOUS at 22:41

## 2020-01-16 RX ADMIN — INSULIN LISPRO 2 UNITS: 100 INJECTION, SOLUTION INTRAVENOUS; SUBCUTANEOUS at 11:30

## 2020-01-16 RX ADMIN — HYDRALAZINE HYDROCHLORIDE 100 MG: 50 TABLET, FILM COATED ORAL at 15:20

## 2020-01-16 RX ADMIN — Medication 10 ML: at 00:17

## 2020-01-16 RX ADMIN — HYDRALAZINE HYDROCHLORIDE 100 MG: 50 TABLET, FILM COATED ORAL at 00:16

## 2020-01-16 RX ADMIN — BUMETANIDE 2 MG: 1 TABLET ORAL at 08:47

## 2020-01-16 RX ADMIN — CEFEPIME HYDROCHLORIDE 2 G: 2 INJECTION, POWDER, FOR SOLUTION INTRAVENOUS at 22:12

## 2020-01-16 RX ADMIN — CEFEPIME HYDROCHLORIDE 2 G: 2 INJECTION, POWDER, FOR SOLUTION INTRAVENOUS at 08:49

## 2020-01-16 RX ADMIN — CLONIDINE HYDROCHLORIDE 0.3 MG: 0.1 TABLET ORAL at 00:16

## 2020-01-16 RX ADMIN — ACETAMINOPHEN 650 MG: 325 TABLET ORAL at 03:32

## 2020-01-16 RX ADMIN — CLONIDINE HYDROCHLORIDE 0.3 MG: 0.1 TABLET ORAL at 21:15

## 2020-01-16 RX ADMIN — METRONIDAZOLE 500 MG: 500 INJECTION, SOLUTION INTRAVENOUS at 10:10

## 2020-01-16 RX ADMIN — Medication 10 ML: at 22:15

## 2020-01-16 RX ADMIN — INSULIN GLARGINE 15 UNITS: 100 INJECTION, SOLUTION SUBCUTANEOUS at 21:15

## 2020-01-16 RX ADMIN — BUSPIRONE HYDROCHLORIDE 10 MG: 5 TABLET ORAL at 18:24

## 2020-01-16 RX ADMIN — Medication 10 ML: at 06:25

## 2020-01-16 RX ADMIN — Medication 1 CAPSULE: at 12:10

## 2020-01-16 RX ADMIN — HYDRALAZINE HYDROCHLORIDE 100 MG: 50 TABLET, FILM COATED ORAL at 06:24

## 2020-01-16 RX ADMIN — METOPROLOL SUCCINATE 100 MG: 50 TABLET, EXTENDED RELEASE ORAL at 08:48

## 2020-01-16 RX ADMIN — ACETAMINOPHEN 650 MG: 325 TABLET ORAL at 21:24

## 2020-01-16 RX ADMIN — WARFARIN SODIUM 2 MG: 2 TABLET ORAL at 18:24

## 2020-01-16 RX ADMIN — ACETAMINOPHEN 650 MG: 325 TABLET ORAL at 08:47

## 2020-01-16 RX ADMIN — VANCOMYCIN HYDROCHLORIDE 1250 MG: 10 INJECTION, POWDER, LYOPHILIZED, FOR SOLUTION INTRAVENOUS at 19:40

## 2020-01-16 NOTE — PROGRESS NOTES
Bedside and Verbal shift change report given to aimee (oncoming nurse) by Xiomara (offgoing nurse). Report included the following information SBAR, Kardex, ED Summary, Procedure Summary, Intake/Output, MAR, Accordion and Med Rec Status.

## 2020-01-16 NOTE — PROGRESS NOTES
Hospitalist Progress Note NAME: Lydia Pickett :  1960 MRN:  [id number] Interim Hospital Summary: 60 y.o. female whom presented on 2020 with Assessment / Plan: 
 
Right lower extremity cellulitis POA B/L foot  Diabetic Ulcers POA Sepsis POA secondary to above 
-PCN allergy limits abx choices 
-continue empiric Vanco, cefepime and flagyl. Rash looks like erysipelas 
-follow up on cultures - NGTD. -MRI left LE could not be done as her PPM is not MR conditional 
-Podiatry consult. Source of cellulitis likely diabetic/neuropathic foot ulcers 
-leukocytosis has resolved. Leg rash looks about the same, but not worse. Monitor renal function closely while on vancomycin.   
  
Paroxysmal A. Fib Supratherapeutic INR Continue metoprolol INR has come down. Can restart warfarin - continue monitor for bleeding 
  
Prolonged QTC 
- as per monitor 
-Avoid QTC prolonging drugs -Monitor electrolytes, replace if needed, added mag. 
  
DM II 
A1c 6.7 in November 
-Continue Lantus 15 Units with SSI AC/HS  
  
History of bilateral breast cancer Essential hypertension Hyperlipidemia Continue home meds 
  
CKD IV 
  
  
Surrogate Decision Ronit Standard   Code status: Full Prophylaxis: Coumadin Recommended Disposition: Home w/Family  
 
30.0 - 39.9 Obese / Body mass index is 35.9 kg/m². Subjective: Chief Complaint / Reason for Physician Visit Follow up of cellulitis, PAF, supratherapeutic INR Chart reviewed in detail. Discussed with RN events overnight. Leg looks the same. INR lower. WBC lower Review of Systems: 
Symptom Y/N Comments  Symptom Y/N Comments Fever/Chills    Chest Pain Poor Appetite    Edema Cough    Abdominal Pain Sputum    Joint Pain SOB/CHOW    Pruritis/Rash Nausea/vomit    Tolerating PT/OT Diarrhea    Tolerating Diet Constipation    Other Could NOT obtain due to:   
 
PO intake: No data found. Objective: VITALS:  
Last 24hrs VS reviewed since prior progress note. Most recent are: 
Patient Vitals for the past 24 hrs: 
 Temp Pulse Resp BP SpO2  
01/16/20 0842 97.8 °F (36.6 °C) 66 16 123/58 98 % 01/16/20 0323 97.9 °F (36.6 °C) 60 18 144/63 97 % 01/16/20 0024 98 °F (36.7 °C)  18 152/66 96 % 01/15/20 2004 100.2 °F (37.9 °C) 61 18 147/63 93 % 01/15/20 1706 99 °F (37.2 °C) 62 18 135/55 96 % 01/15/20 1508 99.5 °F (37.5 °C) 70 18 (!) 104/36 97 % 01/15/20 1329 99.2 °F (37.3 °C)      
01/15/20 1241 98.3 °F (36.8 °C) 66 18 140/54 100 % No intake or output data in the 24 hours ending 01/16/20 1113 PHYSICAL EXAM: 
General: WD, WN. Alert, cooperative, no acute distress   
EENT:  EOMI. Anicteric sclerae. MMM Resp:  CTA bilaterally, no wheezing or rales. No accessory muscle use CV:  Regular  rhythm,  No edema GI:  Soft, Non distended, Non tender.  +Bowel sounds Neurologic:  Alert and oriented X 3, normal speech, Psych:   Good insight. Not anxious nor agitated Skin:  (+) left leg rash, violaceous, tender, streaking up inner leg. Borders marked with sharpie Reviewed most current lab test results and cultures  YES Reviewed most current radiology test results   YES Review and summation of old records today    NO Reviewed patient's current orders and MAR    YES 
PMH/SH reviewed - no change compared to H&P 
________________________________________________________________________ Care Plan discussed with: 
  Comments Patient x Family RN x Care Manager Consultant Multidiciplinary team rounds were held today with , nursing, pharmacist and clinical coordinator. Patient's plan of care was discussed; medications were reviewed and discharge planning was addressed. ________________________________________________________________________ Total NON critical care TIME:  20   Minutes Total CRITICAL CARE TIME Spent:   Minutes non procedure based Comments >50% of visit spent in counseling and coordination of care x This includes time during multidisciplinary rounds if indicated above  
________________________________________________________________________ Obdulia Myrick MD  
 
Procedures: see electronic medical records for all procedures/Xrays and details which were not copied into this note but were reviewed prior to creation of Plan. LABS: 
I reviewed today's most current labs and imaging studies. Pertinent labs include: 
Recent Labs  
  01/16/20 
0404 01/15/20 
0502 01/14/20 
1809 WBC 9.1 16.2* 24.3* HGB 10.8* 10.0* 11.6 HCT 34.6* 31.5* 36.5  208 254 Recent Labs  
  01/16/20 
0404 01/15/20 
0502 01/14/20 
1858 01/14/20 
1809  135*  --  133* K 3.8 4.3  --  4.6  108  --  104 CO2 21 20*  --  19* * 146*  --  291* BUN 34* 30*  --  27* CREA 1.99* 1.82*  --  2.08* CA 9.1 8.6  --  8.8 MG 2.3 2.1 2.1  --   
ALB  --   --   --  3.5 TBILI  --   --   --  0.4 SGOT  --   --   --  20 ALT  --   --   --  18 INR 1.8* 9.9* 10.8*  --

## 2020-01-16 NOTE — PROGRESS NOTES
Pharmacy Daily Dosing of Warfarin Indication: a fib Goal INR: 2-3 PTA Warfarin Dose: 4 mg Mon, Thurs and 2 mg on Tues, Wed, Fri, Sat, Sun 
 
Concurrent anticoagulants: none Concurrent antiplatelet: none Major Interacting Medications Drugs that may increase INR: metronidazole Drugs that may decrease INR: n/a Conditions that may increase/decrease INR (CHF, C. diff, cirrhosis, thyroid disorder, hypoalbuminemia):  
 
Labs: 
Recent Labs  
  01/16/20 
0404 01/15/20 
0502 01/14/20 
1858 01/14/20 
1809 INR 1.8* 9.9* 10.8*  --   
HGB 10.8* 10.0*  --  11.6  208  --  254 SGOT  --   --   --  20  
TBILI  --   --   --  0.4 ALB  --   --   --  3.5 Impression/Plan:  
Vitamin 5mg po x1 given 1/15 @ 0910 INR 1.8 after 9.9 yesterday Resume Warfarin 2mg today; may be resistant d/t Vitamin K Daily INR 
CBC w/o diff QOD Pharmacy will follow daily and adjust the dose as appropriate. Thanks Jose Carlos Edgar [facility] 
 
 
[email or web address]/[email or web address]/[email or web address]%[email or web address]. [email or web address]

## 2020-01-17 LAB
ANION GAP SERPL CALC-SCNC: 6 MMOL/L (ref 5–15)
BUN SERPL-MCNC: 33 MG/DL (ref 6–20)
BUN/CREAT SERPL: 16 (ref 12–20)
CALCIUM SERPL-MCNC: 8.9 MG/DL (ref 8.5–10.1)
CHLORIDE SERPL-SCNC: 107 MMOL/L (ref 97–108)
CO2 SERPL-SCNC: 24 MMOL/L (ref 21–32)
CREAT SERPL-MCNC: 2.04 MG/DL (ref 0.55–1.02)
GLUCOSE BLD STRIP.AUTO-MCNC: 138 MG/DL (ref 65–100)
GLUCOSE BLD STRIP.AUTO-MCNC: 139 MG/DL (ref 65–100)
GLUCOSE BLD STRIP.AUTO-MCNC: 140 MG/DL (ref 65–100)
GLUCOSE BLD STRIP.AUTO-MCNC: 184 MG/DL (ref 65–100)
GLUCOSE SERPL-MCNC: 139 MG/DL (ref 65–100)
INR PPP: 1.4 (ref 0.9–1.1)
POTASSIUM SERPL-SCNC: 3.8 MMOL/L (ref 3.5–5.1)
PROTHROMBIN TIME: 14 SEC (ref 9–11.1)
SERVICE CMNT-IMP: ABNORMAL
SODIUM SERPL-SCNC: 137 MMOL/L (ref 136–145)
VANCOMYCIN SERPL-MCNC: 23.8 UG/ML

## 2020-01-17 PROCEDURE — 74011250637 HC RX REV CODE- 250/637: Performed by: INTERNAL MEDICINE

## 2020-01-17 PROCEDURE — 80202 ASSAY OF VANCOMYCIN: CPT

## 2020-01-17 PROCEDURE — 80048 BASIC METABOLIC PNL TOTAL CA: CPT

## 2020-01-17 PROCEDURE — 74011250636 HC RX REV CODE- 250/636: Performed by: INTERNAL MEDICINE

## 2020-01-17 PROCEDURE — 74011000250 HC RX REV CODE- 250: Performed by: INTERNAL MEDICINE

## 2020-01-17 PROCEDURE — 65270000029 HC RM PRIVATE

## 2020-01-17 PROCEDURE — 74011000258 HC RX REV CODE- 258: Performed by: INTERNAL MEDICINE

## 2020-01-17 PROCEDURE — 77030009526 HC GEL CARSYN MDII -A

## 2020-01-17 PROCEDURE — 82962 GLUCOSE BLOOD TEST: CPT

## 2020-01-17 PROCEDURE — 74011636637 HC RX REV CODE- 636/637: Performed by: INTERNAL MEDICINE

## 2020-01-17 PROCEDURE — 94760 N-INVAS EAR/PLS OXIMETRY 1: CPT

## 2020-01-17 PROCEDURE — 85610 PROTHROMBIN TIME: CPT

## 2020-01-17 PROCEDURE — 36415 COLL VENOUS BLD VENIPUNCTURE: CPT

## 2020-01-17 RX ORDER — WARFARIN 2 MG/1
4 TABLET ORAL ONCE
Status: COMPLETED | OUTPATIENT
Start: 2020-01-17 | End: 2020-01-17

## 2020-01-17 RX ORDER — LABETALOL HYDROCHLORIDE 5 MG/ML
10-20 INJECTION, SOLUTION INTRAVENOUS
Status: DISCONTINUED | OUTPATIENT
Start: 2020-01-17 | End: 2020-01-20 | Stop reason: HOSPADM

## 2020-01-17 RX ADMIN — ONDANSETRON 4 MG: 2 INJECTION INTRAMUSCULAR; INTRAVENOUS at 12:36

## 2020-01-17 RX ADMIN — INSULIN GLARGINE 15 UNITS: 100 INJECTION, SOLUTION SUBCUTANEOUS at 22:12

## 2020-01-17 RX ADMIN — WARFARIN SODIUM 4 MG: 2 TABLET ORAL at 17:28

## 2020-01-17 RX ADMIN — Medication 10 ML: at 22:12

## 2020-01-17 RX ADMIN — HYDRALAZINE HYDROCHLORIDE 100 MG: 50 TABLET, FILM COATED ORAL at 22:15

## 2020-01-17 RX ADMIN — LABETALOL HYDROCHLORIDE 20 MG: 5 INJECTION INTRAVENOUS at 20:25

## 2020-01-17 RX ADMIN — METRONIDAZOLE 500 MG: 500 INJECTION, SOLUTION INTRAVENOUS at 20:55

## 2020-01-17 RX ADMIN — CLONIDINE HYDROCHLORIDE 0.3 MG: 0.1 TABLET ORAL at 22:15

## 2020-01-17 RX ADMIN — CEFTRIAXONE 1 G: 1 INJECTION, POWDER, FOR SOLUTION INTRAMUSCULAR; INTRAVENOUS at 17:36

## 2020-01-17 RX ADMIN — Medication 10 ML: at 05:42

## 2020-01-17 RX ADMIN — HYDRALAZINE HYDROCHLORIDE 100 MG: 50 TABLET, FILM COATED ORAL at 05:41

## 2020-01-17 RX ADMIN — Medication 10 ML: at 13:28

## 2020-01-17 RX ADMIN — BUMETANIDE 2 MG: 1 TABLET ORAL at 08:41

## 2020-01-17 RX ADMIN — Medication 1 CAPSULE: at 08:41

## 2020-01-17 RX ADMIN — METOPROLOL SUCCINATE 100 MG: 50 TABLET, EXTENDED RELEASE ORAL at 08:42

## 2020-01-17 RX ADMIN — ACETAMINOPHEN 650 MG: 325 TABLET ORAL at 12:31

## 2020-01-17 RX ADMIN — BUSPIRONE HYDROCHLORIDE 10 MG: 5 TABLET ORAL at 17:28

## 2020-01-17 RX ADMIN — BUSPIRONE HYDROCHLORIDE 10 MG: 5 TABLET ORAL at 08:41

## 2020-01-17 RX ADMIN — ONDANSETRON 4 MG: 2 INJECTION INTRAMUSCULAR; INTRAVENOUS at 16:37

## 2020-01-17 RX ADMIN — LABETALOL HYDROCHLORIDE 15 MG: 5 INJECTION INTRAVENOUS at 16:32

## 2020-01-17 RX ADMIN — CEFEPIME HYDROCHLORIDE 2 G: 2 INJECTION, POWDER, FOR SOLUTION INTRAVENOUS at 08:44

## 2020-01-17 RX ADMIN — HYDRALAZINE HYDROCHLORIDE 100 MG: 50 TABLET, FILM COATED ORAL at 12:34

## 2020-01-17 NOTE — PROGRESS NOTES
Hospitalist Progress Note NAME: Ana Luisa Fraire :  1960 MRN:  [id number] Interim Hospital Summary: 60 y.o. female whom presented on 2020 with Assessment / Plan: 
 
Right lower extremity cellulitis / erysipelas, POA 
B/L foot  Diabetic Ulcers POA Sepsis POA secondary to above 
-PCN allergy limits abx choices 
-continue empiric Vanco for now as patient does not feel better and leg hurting more 
-Rash looks like erysipelas, will change cefepime to ceftriaxone 
-blood cultures - NGTD. -MRI left LE could not be done as her PPM is not MR conditional 
-leukocytosis has resolved. Leg rash looks about the same, but leg hurting more. Monitor renal function closely while on vancomycin.   
  
Paroxysmal A. Fib Supratherapeutic INR Continue metoprolol INR has come down. Adjusting warfarin 
  
Prolonged QTC 
- as per monitor 
-Avoid QTC prolonging drugs -Monitor electrolytes, replace if needed, added mag. 
  
DM II 
A1c 6.7 in November 
-Continue Lantus 15 Units with SSI AC/HS  
  
History of bilateral breast cancer Essential hypertension Hyperlipidemia Continue home meds 
  
CKD IV 
  
  
Surrogate Decision Rosalina Millard   Code status: Full Prophylaxis: Coumadin Recommended Disposition: Home w/Family  
 
30.0 - 39.9 Obese / Body mass index is 35.9 kg/m². Subjective: Chief Complaint / Reason for Physician Visit Follow up of cellulitis, PAF, supratherapeutic INR Chart reviewed in detail. Discussed with RN events overnight. Review of Systems: 
Symptom Y/N Comments  Symptom Y/N Comments Fever/Chills    Chest Pain Poor Appetite    Edema Cough    Abdominal Pain Sputum    Joint Pain SOB/CHOW    Pruritis/Rash Nausea/vomit    Tolerating PT/OT Diarrhea    Tolerating Diet Constipation    Other Could NOT obtain due to:   
 
PO intake: No data found. Objective: VITALS:  
Last 24hrs VS reviewed since prior progress note. Most recent are: 
Patient Vitals for the past 24 hrs: 
 Temp Pulse Resp BP SpO2  
01/17/20 0811 97.9 °F (36.6 °C) 75 18 168/67 97 % 01/17/20 0323 97.8 °F (36.6 °C) 68 18 162/59 97 % 01/16/20 2336 98.1 °F (36.7 °C) 62 16 140/40 100 % 01/16/20 1848 98 °F (36.7 °C) 65 18 155/58   
01/16/20 1521 97.6 °F (36.4 °C) 60 18 170/70 96 % No intake or output data in the 24 hours ending 01/17/20 1037 PHYSICAL EXAM: 
General: WD, WN. Alert, cooperative, no acute distress   
EENT:  EOMI. Anicteric sclerae. MMM Resp:  CTA bilaterally, no wheezing or rales. No accessory muscle use CV:  Regular  rhythm,  No edema GI:  Soft, Non distended, Non tender.  +Bowel sounds Neurologic:  Alert and oriented X 3, normal speech, Psych:   Good insight. Not anxious nor agitated Skin:  (+) left leg rash, violaceous, tender, streaking up inner leg. Borders marked with sharpie Reviewed most current lab test results and cultures  YES Reviewed most current radiology test results   YES Review and summation of old records today    NO Reviewed patient's current orders and MAR    YES 
PMH/ reviewed - no change compared to H&P 
________________________________________________________________________ Care Plan discussed with: 
  Comments Patient x Family RN x Care Manager Consultant Multidiciplinary team rounds were held today with , nursing, pharmacist and clinical coordinator. Patient's plan of care was discussed; medications were reviewed and discharge planning was addressed. ________________________________________________________________________ Total NON critical care TIME:  20   Minutes Total CRITICAL CARE TIME Spent:   Minutes non procedure based Comments >50% of visit spent in counseling and coordination of care x This includes time during multidisciplinary rounds if indicated above ________________________________________________________________________ Mike No MD  
 
Procedures: see electronic medical records for all procedures/Xrays and details which were not copied into this note but were reviewed prior to creation of Plan. LABS: 
I reviewed today's most current labs and imaging studies. Pertinent labs include: 
Recent Labs  
  01/16/20 
0404 01/15/20 
0502 01/14/20 
1809 WBC 9.1 16.2* 24.3* HGB 10.8* 10.0* 11.6 HCT 34.6* 31.5* 36.5  208 254 Recent Labs  
  01/17/20 
0325 01/16/20 
0404 01/15/20 
0502 01/14/20 
1858  01/14/20 
1809  136 135*  --   --  133* K 3.8 3.8 4.3  --   --  4.6  107 108  --   --  104 CO2 24 21 20*  --   --  19* * 135* 146*  --   --  291* BUN 33* 34* 30*  --   --  27* CREA 2.04* 1.99* 1.82*  --   --  2.08* CA 8.9 9.1 8.6  --   --  8.8 MG  --  2.3 2.1 2.1  --   --   
ALB  --   --   --   --   --  3.5 TBILI  --   --   --   --   --  0.4 SGOT  --   --   --   --   --  20 ALT  --   --   --   --   --  18 INR 1.4* 1.8* 9.9* 10.8*   < >  --   
 < > = values in this interval not displayed.

## 2020-01-17 NOTE — PROGRESS NOTES
Pharmacy Automatic Renal Dosing Protocol - Antimicrobials Indication for Antimicrobials: LLE Cellulitis-? Erysipelas; BL DM foot ulcers Current Regimen of Each Antimicrobial: 
Vancomycin 1gm IV  q24h, (Start Date ; Day # 4) Metronidazole 500 mg IV every 12 hours (Start Date ; Day # 4) Ceftriaxone 1gm IV q24h x4 days; start ; through  Previous Antimicrobial Therapy: 
Cefepime 2 gm IV every 12 hours (Start Date ; Day # 3) Goal Level: VANCOMYCIN TROUGH GOAL RANGE 
 
~15 (AUC: 400 - 600 mg/hr/Liter/day) Date Dose & Interval Measured (mcg/mL) Extrapolated (mcg/mL)  1250 mg Q24H 20.7 20.2  1.25gm IV q24h 23.8 20.1 Date & time of next level:  
 
Significant Cultures:  
 Blood = NGSF (pending) Radiology / Imaging results: (X-ray, CT scan or MRI):  
: XRay Left Foot: severe diffuse osteopenia. Resection of the second metatarsal head. There is no acute fracture or dislocation. Plantar ulceration of the heel. Extensive atherosclerotic calcifications : XRay Right Foot: Soft tissue ulceration without acute finding Labs: 
Recent Labs  
  20 
0325 20 
0404 01/15/20 
0502 20 
1809 CREA 2.04* 1.99* 1.82* 2.08* BUN 33* 34* 30* 27* WBC  --  9.1 16.2* 24.3* Temp (24hrs), Av.9 °F (36.6 °C), Min:97.6 °F (36.4 °C), Max:98.1 °F (36.7 °C) Paralysis, amputations, malnutrition: not noted Creatinine Clearance (mL/min) or Dialysis: 32 ml/min Impression/Plan:  
Cr 2.04; I ordered random Vancomycin level: 23.8; Cp(min) = 20.2 if given q24h, which is higher than desired (10-15) to treat Cellulitis Cp(max) = 35.5; AUC = 653; T1/2 = 26.8hr 
Continue to hold Vancomycin through 9am tomorrow, then resume Vancomycin at 1gm IV q24h to achieve/maintain Trough Range 10-15 and -600 Cefepime changed to Ceftriaxone 1gm IV q24h (x4 days) for suspected Erysipelas Continue Metronidazole 500 mg IV every 12 hours BMP daily Antimicrobial stop date TBD Pharmacy will follow daily and adjust medications as appropriate for renal function and/or serum levels. Thank you, 
Óscar Quintanilla [facility] Recommended duration of therapy 
http://[facility]/[place]/[facility]/Pharmacy/Clinical%20Companion/Duration%20of%20ABX%20therapy. docx Renal Dosing 
http://[facility]/[place]/[facility]/Pharmacy/Clinical%20Companion/Renal%20Dosing%[id number]. pdf

## 2020-01-17 NOTE — PROGRESS NOTES
Patient apologetic about her anxiety and removal of Telemetry. Asked to have heart monitor back on . Says she will talk to MD regarding her ongoing care and choices. Replaced Telemetry Pt calm at present

## 2020-01-17 NOTE — PROGRESS NOTES
Pharmacy Daily Dosing of Warfarin Indication: a fib Goal INR: 2-3 PTA Warfarin Dose: 4 mg Mon, Thurs and 2 mg on Tues, Wed, Fri, Sat, Sun 
 
Concurrent anticoagulants: none Concurrent antiplatelet: none Major Interacting Medications Drugs that may increase INR: metronidazole Drugs that may decrease INR: n/a Conditions that may increase/decrease INR (CHF, C. diff, cirrhosis, thyroid disorder, hypoalbuminemia):  
 
Labs: 
Recent Labs  
  01/17/20 
0325 01/16/20 
0404 01/15/20 
0502  01/14/20 
1809 INR 1.4* 1.8* 9.9*   < >  --   
HGB  --  10.8* 10.0*  --  11.6 PLT  --  214 208  --  254 SGOT  --   --   --   --  20  
TBILI  --   --   --   --  0.4 ALB  --   --   --   --  3.5  
 < > = values in this interval not displayed. Impression/Plan:  
Vitamin 5mg po x1 given 1/15 @ 0910 INR 1.4 today after Warfarin 2mg Warfarin 4mg today; may be resistant d/t Vitamin K Daily INR 
CBC w/o diff QOD Pharmacy will follow daily and adjust the dose as appropriate. Thanks Sondra Edgar [facility] 
 
 
[email or web address]/[place]/[facility]/Pharmacy/Clinical%20Companion/Warfarin%20Dosing%20Protocol. pdf

## 2020-01-17 NOTE — PROGRESS NOTES
Pharmacy Automatic Renal Dosing Protocol - Antimicrobials Indication for Antimicrobials: LLE Cellulitis; BL DM foot ulcers Current Regimen of Each Antimicrobial: 
Vancomycin 1250 mg q24h, (Start Date ; Day # 3) Cefepime 2 gm IV every 12 hours (Start Date ; Day # 3) Metronidazole 500 mg IV every 12 hours (Start Date ; Day # 3) Previous Antimicrobial Therapy: 
 
Goal Level: VANCOMYCIN TROUGH GOAL RANGE 
 
~15 (AUC: 400 - 600 mg/hr/Liter/day) Date Dose & Interval Measured (mcg/mL) Extrapolated (mcg/mL)  1250 mg Q24H 20.7 24.7 Date & time of next level:  
 
Significant Cultures:  
 Blood = NGSF (pending) Radiology / Imaging results: (X-ray, CT scan or MRI):  
: XRay Left Foot: severe diffuse osteopenia. Resection of the second metatarsal head. There is no acute fracture or dislocation. Plantar ulceration of the heel. Extensive atherosclerotic calcifications : XRay Right Foot: Soft tissue ulceration without acute finding Labs: 
Recent Labs  
  20 
0404 01/15/20 
0502 20 
1809 CREA 1.99* 1.82* 2.08* BUN 34* 30* 27* WBC 9.1 16.2* 24.3* Temp (24hrs), Av.9 °F (36.6 °C), Min:97.6 °F (36.4 °C), Max:98 °F (36.7 °C) Paralysis, amputations, malnutrition: not noted Creatinine Clearance (mL/min) or Dialysis: 33 ml/min Impression/Plan:  
Vancomycin level at steady state 24.7 mcg/ml, dosage to 1000 mg Q24H with anticipated trough of 19.8 mcg/ml. Continue Cefepime 2 gm IV every 12 hours Continue Metronidazole 500 mg IV every 12 hours BMP daily Antimicrobial stop date TBD Pharmacy will follow daily and adjust medications as appropriate for renal function and/or serum levels. Thank you, 
Peggy No [facility] Recommended duration of therapy 
http://[email or web address]/[place]/[facility]/Pharmacy/Clinical%20Companion/Duration%20of%20ABX%20therapy. docx Renal Dosing http://[facility]/[place]/[facility]/Pharmacy/Clinical%20Companion/Renal%[id number]. pdf

## 2020-01-17 NOTE — PROGRESS NOTES
1210 - Give 2pm hydralazine early per Dr. Bart Harrison.  
 
1 - Paged Dr. Bart Harrison regarding Patient elevated BP.  
 
1630 - Order obtained for labetalol.

## 2020-01-17 NOTE — PROGRESS NOTES
Spiritual Care Partner Volunteer visited patient on Ortho unit on 1/17/20. Rev. Hazel Graham EdD MDiv  For [facility] Page [phone]

## 2020-01-17 NOTE — PROGRESS NOTES
Patient is getting frustrated with slow healing and feeling anxious. Wants Saline lock changed but pt is difficult  and only one arm can be used for IV access and antibiotics. Pt verbalizing she just wants to stop everything and go home. Allowed pt. To vent and will look to restart IV access when lab work drawn

## 2020-01-17 NOTE — PROGRESS NOTES
Patient states she is going to go home later in the morning. Says she will call her cousin to get her later. Pt. Is alert and oriented and says she is just tired of not getting well

## 2020-01-17 NOTE — WOUND CARE
Wound care Nurse Consult from staff nurse stating \" wounds on BLE and latha feet\". Patient is a 59 y/o CF sent from Dr Maude Graham office to ED for RLE cellulitis on 1/14/20. Podiatrist, Dr Vik Lyman, has been following this patient for BL plantar DM/Charcot foot wounds. Per Dr Tri Rogers note 1/14/2020 she has been using Endoform and Hydrafera blue antibacterial foam on both wounds, with a Total Contact Cast (TCC) on left foot and regular dressing on right foot. Past Medical History:  
Diagnosis Date  Acquired lymphedema Right arm  Arrhythmia  Asthma  Atrial fibrillation ([facility] Dr. Ben Pacheco and Dr. Queenie Dolan [facility])  Cancer ([facility] [address] [facility] bilateral breast  
 Chronic kidney disease  Diabetes mellitus type II   
 Dizziness  Essential hypertension  Hyperlipidemia  Hypertension  Long term (current) use of anticoagulants  Morbid obesity ([facility] [address] [facility] 3/14/2012  Osteoarthritis  Pacemaker  Sick sinus syndrome ([facility] [address] [facility] Past Surgical History:  
Procedure Laterality Date  ABDOMEN SURGERY PROC UNLISTED    
 gastric bypass  GASTRIC BYPASS,OBESE<150CM SAW-EN-Y  7/08  
 [facility]  HX AFIB ABLATION    
 HX CARPAL TUNNEL RELEASE [address] [id number] [address] RIGHT MODIFIED RADICAL   
 HX MOHS PROCEDURES  1995  
 right  HX ORTHOPAEDIC    
 ight knee surgery  HX PACEMAKER    
 IR INSERT TUNL CVC W PORT OVER 5 YEARS    
 LAMINECTOMY,CERVICAL  1994  
 NEEDLE BIOPSY LIVER,W OTHR [address]  8.21.07  
 [place] [address] AND 1994  [place] TRABECULOPLASTY BY LASER SURGERY    
 US GUIDE ASP OVARIAN CYST ABD APPROACH  8.21.07  
 RIGHT Patient had these dressing removed yesterday and covered with dry dressings. Unfortunately we do not have Endoform and no Hydrefera available on floor. WOUND POA CONDITIONS Wound Foot Left;Plantar (Active) Dressing Status Clean, dry, and intact 1/17/2020  8:15 AM  
Dressing Type Gauze wrap (arnie) 1/16/2020 12:20 AM  
Wound Length (cm) 2.5 cm 1/14/2020  2:43 PM  
Wound Width (cm) 2.2 cm 1/14/2020  2:43 PM  
Wound Depth (cm) 0.3 cm 1/14/2020  2:43 PM  
Wound Surface Area (cm^2) 5.5 cm^2 1/14/2020  2:43 PM  
Wound Volume (cm^3) 1.65 cm^3 1/14/2020  2:43 PM  
Condition of [place] 1/14/2020  2:43 PM  
Condition of Edges Calloused; Open 1/14/2020  2:43 PM  
Drainage Amount Large 1/14/2020  2:43 PM  
Drainage Color Serosanguinous 1/14/2020  2:43 PM  
Wound Odor Mild 1/14/2020  2:43 PM  
Sandra-wound Assessment Maceration 1/14/2020  2:43 PM  
Cleansing and Cleansing Agents  Soap and water;Normal saline 1/14/2020  2:43 PM  
Number of days: 210 Wound Foot Right;Plantar;Lateral 07/30/19 (Active) Dressing Status Clean, dry, and intact 1/17/2020  8:15 AM  
Dressing Type Gauze wrap (arnie) 1/16/2020 12:20 AM  
Wound Length (cm) 0.7 cm 1/14/2020  2:43 PM  
Wound Width (cm) 1 cm 1/14/2020  2:43 PM  
Wound Depth (cm) 0.1 cm 1/14/2020  2:43 PM  
Wound Surface Area (cm^2) 0.7 cm^2 1/14/2020  2:43 PM  
Wound Volume (cm^3) 0.07 cm^3 1/14/2020  2:43 PM  
Condition of [place] 1/14/2020  2:43 PM  
Condition of Edges Calloused; Open 1/14/2020  2:43 PM  
Drainage Amount Scant 1/16/2020  8:55 AM  
Drainage Color Brown 1/16/2020  8:55 AM  
Wound Odor None 1/14/2020  2:43 PM  
Sandra-wound Assessment Intact 1/14/2020  2:43 PM  
Cleansing and Cleansing Agents  Soap and water;Normal saline 1/14/2020  2:43 PM  
Dressing Changed Changed/New 1/16/2020  8:55 AM  
Procedure Time Out 1516 1/14/2020  3:13 PM  
Consent Obtained  No 1/14/2020  3:13 PM  
Procedure Bleeding None 1/14/2020  3:13 PM  
Procedure Instrument  Blade 1/14/2020  3:13 PM  
Procedure Pain Scale Numeric 0/10 1/14/2020  3:13 PM  
Debridement Procedure Performed by Dr Dottie Du 1/14/2020  3:13 PM  
Procedure Tolerated Well 1/16/2020  8:55 AM  
Number of days: 009 Wound Heel Left;Proximal 12/17/19 (Active) Dressing Status Clean, dry, and intact 1/17/2020  8:15 AM  
Dressing Type Gauze wrap (arnie) 1/16/2020 12:20 AM  
Non-staged Wound Description Full thickness 1/14/2020  2:43 PM  
Wound Length (cm) 0.4 cm 1/14/2020  2:43 PM  
Wound Width (cm) 1 cm 1/14/2020  2:43 PM  
Wound Depth (cm) 0.2 cm 1/14/2020  2:43 PM  
Wound Surface Area (cm^2) 0.4 cm^2 1/14/2020  2:43 PM  
Wound Volume (cm^3) 0.08 cm^3 1/14/2020  2:43 PM  
Condition of [place] 1/14/2020  2:43 PM  
Condition of Edges Open 1/14/2020  2:43 PM  
Drainage Amount Large 1/14/2020  2:43 PM  
Drainage Color Serosanguinous 1/14/2020  2:43 PM  
Wound Odor None 1/14/2020  2:43 PM  
Sandra-wound Assessment Intact 1/14/2020  2:43 PM  
Cleansing and Cleansing Agents  Soap and water;Normal saline 1/14/2020  2:43 PM  
Procedure Time Out 1515 1/14/2020  3:13 PM  
Consent Obtained  Yes 1/14/2020  3:13 PM  
Procedure Bleeding Moderate 1/14/2020  3:13 PM  
Procedure Hemostasis  Silver Nitrate 1/14/2020  3:13 PM  
Type of Tissue Removed  Devitalized 1/14/2020  3:13 PM  
Procedure Instrument  Curette 1/14/2020  3:13 PM  
Procedure Pain Scale Numeric 0/10 1/14/2020  3:13 PM  
Debridement Procedure Performed by DR Chris Lama 1/14/2020  3:13 PM  
Number of days: 31 Wound Foot Right;Plantar 01/16/20 (Active) Dressing Status Removed 1/17/2020 12:35 PM  
Dressing Type Dry dressing 1/17/2020 12:35 PM  
Non-staged Wound Description Partial thickness 1/17/2020 12:35 PM  
Wound Length (cm) 0.7 cm 1/17/2020 12:35 PM  
Wound Width (cm) 1 cm 1/17/2020 12:35 PM  
Wound Depth (cm) 0.1 cm 1/17/2020 12:35 PM  
Wound Surface Area (cm^2) 0.7 cm^2 1/17/2020 12:35 PM  
Wound Volume (cm^3) 0.07 cm^3 1/17/2020 12:35 PM  
Condition of [place] 1/17/2020 12:35 PM  
Drainage Amount Scant 1/17/2020 12:35 PM  
Drainage Color Yellow 1/17/2020 12:35 PM  
Wound Odor None 1/17/2020 12:35 PM  
 Cleansing and Cleansing Agents  Dermal wound cleanser 1/17/2020 12:35 PM  
Dressing Changed Changed/New 1/17/2020 12:35 PM  
Dressing Type Applied Silver products;4 x 4;Gauze wrap (arnie) 1/17/2020 12:35 PM  
Procedure Tolerated Well 1/16/2020  8:55 AM  
Number of days: 1 Wound Heel Left 01/16/20 (Active) Dressing Status Removed; Saturated 1/17/2020 12:35 PM  
Dressing Type Dry dressing 1/17/2020 12:35 PM  
Non-staged Wound Description Full thickness 1/17/2020 12:35 PM  
Wound Length (cm) 2.5 cm 1/17/2020 12:35 PM  
Wound Width (cm) 2.2 cm 1/17/2020 12:35 PM  
Wound Depth (cm) 0.3 cm 1/17/2020 12:35 PM  
Wound Surface Area (cm^2) 5.5 cm^2 1/17/2020 12:35 PM  
Wound Volume (cm^3) 1.65 cm^3 1/17/2020 12:35 PM  
Condition of [facility] 1/17/2020 12:35 PM  
Condition of Edges Calloused; Rolled/curled 1/17/2020 12:35 PM  
Drainage Amount Moderate 1/17/2020 12:35 PM  
Drainage Color Brown 1/17/2020 12:35 PM  
Wound Odor None 1/17/2020 12:35 PM  
Cleansing and Cleansing Agents  Dermal wound cleanser 1/17/2020 12:35 PM  
Dressing Changed Changed/New 1/17/2020 12:35 PM  
Dressing Type Applied Silver products; Absorptive;Gauze wrap (arnie) 1/17/2020 12:35 PM  
Procedure Tolerated Well 1/17/2020 12:35 PM  
Number of days: 1 Recommend: 
 
Left plantar foot dressing: daily, cleanse with dermal wound cleanser, wipe clean. Apply dry piece of Aquacell-Ag/Opticell-Ag so that it fits into wound, cover with an Exu-dry/Optilock pad high drainage dressing and secure with gauze arnie. Right plantar foot wound: MWF, cleanse with dermal wound cleanser spray, wipe clean with 4x4. Cover wound with Aquacell-Ag/Opticell-Ag, dry 4x4's and secure with gauze arnie. Float heels Sally Torres RN, [employer]

## 2020-01-18 LAB
ANION GAP SERPL CALC-SCNC: 10 MMOL/L (ref 5–15)
BUN SERPL-MCNC: 26 MG/DL (ref 6–20)
BUN/CREAT SERPL: 14 (ref 12–20)
CALCIUM SERPL-MCNC: 8.7 MG/DL (ref 8.5–10.1)
CHLORIDE SERPL-SCNC: 103 MMOL/L (ref 97–108)
CO2 SERPL-SCNC: 23 MMOL/L (ref 21–32)
CREAT SERPL-MCNC: 1.85 MG/DL (ref 0.55–1.02)
ERYTHROCYTE [DISTWIDTH] IN BLOOD BY AUTOMATED COUNT: 13.9 % (ref 11.5–14.5)
GLUCOSE BLD STRIP.AUTO-MCNC: 118 MG/DL (ref 65–100)
GLUCOSE BLD STRIP.AUTO-MCNC: 124 MG/DL (ref 65–100)
GLUCOSE BLD STRIP.AUTO-MCNC: 138 MG/DL (ref 65–100)
GLUCOSE BLD STRIP.AUTO-MCNC: 161 MG/DL (ref 65–100)
GLUCOSE SERPL-MCNC: 132 MG/DL (ref 65–100)
HCT VFR BLD AUTO: 37.1 % (ref 35–47)
HGB BLD-MCNC: 11.5 G/DL (ref 11.5–16)
INR PPP: 1.5 (ref 0.9–1.1)
MCH RBC QN AUTO: 28.7 PG (ref 26–34)
MCHC RBC AUTO-ENTMCNC: 31 G/DL (ref 30–36.5)
MCV RBC AUTO: 92.5 FL (ref 80–99)
NRBC # BLD: 0 K/UL (ref 0–0.01)
NRBC BLD-RTO: 0 PER 100 WBC
PLATELET # BLD AUTO: 280 K/UL (ref 150–400)
PMV BLD AUTO: 10.5 FL (ref 8.9–12.9)
POTASSIUM SERPL-SCNC: 3.2 MMOL/L (ref 3.5–5.1)
PROTHROMBIN TIME: 14.7 SEC (ref 9–11.1)
RBC # BLD AUTO: 4.01 M/UL (ref 3.8–5.2)
SERVICE CMNT-IMP: ABNORMAL
SODIUM SERPL-SCNC: 136 MMOL/L (ref 136–145)
WBC # BLD AUTO: 7.1 K/UL (ref 3.6–11)

## 2020-01-18 PROCEDURE — 65270000029 HC RM PRIVATE

## 2020-01-18 PROCEDURE — 94760 N-INVAS EAR/PLS OXIMETRY 1: CPT

## 2020-01-18 PROCEDURE — 74011000258 HC RX REV CODE- 258: Performed by: INTERNAL MEDICINE

## 2020-01-18 PROCEDURE — 85027 COMPLETE CBC AUTOMATED: CPT

## 2020-01-18 PROCEDURE — 82962 GLUCOSE BLOOD TEST: CPT

## 2020-01-18 PROCEDURE — 74011250637 HC RX REV CODE- 250/637: Performed by: INTERNAL MEDICINE

## 2020-01-18 PROCEDURE — 74011250636 HC RX REV CODE- 250/636: Performed by: INTERNAL MEDICINE

## 2020-01-18 PROCEDURE — 74011636637 HC RX REV CODE- 636/637: Performed by: INTERNAL MEDICINE

## 2020-01-18 PROCEDURE — 36415 COLL VENOUS BLD VENIPUNCTURE: CPT

## 2020-01-18 PROCEDURE — 80048 BASIC METABOLIC PNL TOTAL CA: CPT

## 2020-01-18 PROCEDURE — 85610 PROTHROMBIN TIME: CPT

## 2020-01-18 RX ORDER — WARFARIN 2 MG/1
2 TABLET ORAL ONCE
Status: COMPLETED | OUTPATIENT
Start: 2020-01-18 | End: 2020-01-18

## 2020-01-18 RX ADMIN — BUSPIRONE HYDROCHLORIDE 10 MG: 5 TABLET ORAL at 18:45

## 2020-01-18 RX ADMIN — METRONIDAZOLE 500 MG: 500 INJECTION, SOLUTION INTRAVENOUS at 12:15

## 2020-01-18 RX ADMIN — CEFTRIAXONE 1 G: 1 INJECTION, POWDER, FOR SOLUTION INTRAMUSCULAR; INTRAVENOUS at 18:44

## 2020-01-18 RX ADMIN — METRONIDAZOLE 500 MG: 500 INJECTION, SOLUTION INTRAVENOUS at 21:34

## 2020-01-18 RX ADMIN — INSULIN GLARGINE 15 UNITS: 100 INJECTION, SOLUTION SUBCUTANEOUS at 22:50

## 2020-01-18 RX ADMIN — ONDANSETRON 4 MG: 2 INJECTION INTRAMUSCULAR; INTRAVENOUS at 09:45

## 2020-01-18 RX ADMIN — Medication 10 ML: at 06:08

## 2020-01-18 RX ADMIN — HYDRALAZINE HYDROCHLORIDE 100 MG: 50 TABLET, FILM COATED ORAL at 15:30

## 2020-01-18 RX ADMIN — VANCOMYCIN HYDROCHLORIDE 1000 MG: 1 INJECTION, POWDER, LYOPHILIZED, FOR SOLUTION INTRAVENOUS at 09:42

## 2020-01-18 RX ADMIN — Medication 10 ML: at 15:30

## 2020-01-18 RX ADMIN — LABETALOL HYDROCHLORIDE 10 MG: 5 INJECTION INTRAVENOUS at 20:26

## 2020-01-18 RX ADMIN — HYDRALAZINE HYDROCHLORIDE 100 MG: 50 TABLET, FILM COATED ORAL at 22:50

## 2020-01-18 RX ADMIN — BUMETANIDE 2 MG: 1 TABLET ORAL at 09:55

## 2020-01-18 RX ADMIN — CLONIDINE HYDROCHLORIDE 0.3 MG: 0.1 TABLET ORAL at 22:49

## 2020-01-18 RX ADMIN — HYDRALAZINE HYDROCHLORIDE 100 MG: 50 TABLET, FILM COATED ORAL at 06:07

## 2020-01-18 RX ADMIN — WARFARIN SODIUM 2 MG: 2 TABLET ORAL at 18:45

## 2020-01-18 RX ADMIN — BUSPIRONE HYDROCHLORIDE 10 MG: 5 TABLET ORAL at 09:55

## 2020-01-18 RX ADMIN — INSULIN LISPRO 2 UNITS: 100 INJECTION, SOLUTION INTRAVENOUS; SUBCUTANEOUS at 17:05

## 2020-01-18 RX ADMIN — ONDANSETRON 4 MG: 2 INJECTION INTRAMUSCULAR; INTRAVENOUS at 20:23

## 2020-01-18 RX ADMIN — METOPROLOL SUCCINATE 100 MG: 50 TABLET, EXTENDED RELEASE ORAL at 09:56

## 2020-01-18 NOTE — PROGRESS NOTES
Pharmacy Daily Dosing of Warfarin Indication: a fib Goal INR: 2-3 PTA Warfarin Dose: 4 mg Mon, Thurs and 2 mg on Tues, Wed, Fri, Sat, Sun 
 
Concurrent anticoagulants: none Concurrent antiplatelet: none Major Interacting Medications Drugs that may increase INR: metronidazole Drugs that may decrease INR: n/a Conditions that may increase/decrease INR (CHF, C. diff, cirrhosis, thyroid disorder, hypoalbuminemia):  
 
Labs: 
Recent Labs  
  01/18/20 
0325 01/17/20 
0325 01/16/20 
0404 INR 1.5* 1.4* 1.8* HGB 11.5  --  10.8*   --  214 Diet:  Diabetic Impression/Plan:  
Vitamin 5mg po x1 given 1/15 @ 0910 Booster dose provided 1/17 Warfarin 2mg (vit K may still lingering effect) Daily INR 
CBC w/o diff QODay Pharmacy will follow daily and adjust the dose as appropriate.  
 
Thank you,  
Shannon Dueñas, [facility]

## 2020-01-18 NOTE — PROGRESS NOTES
Bedside shift change report given to GURINDER Burnette (oncoming nurse) by Nadja Hopson RN (offgoing nurse). Report included the following information SBAR, Kardex, Procedure Summary, Intake/Output, MAR, Accordion, Recent Results, Med Rec Status and Cardiac Rhythm Paced.

## 2020-01-18 NOTE — PROGRESS NOTES
Hospitalist Progress Note NAME: Yajaira Swanson :  1960 MRN:  [id number] Interim Hospital Summary: 60 y.o. female whom presented on 2020 with Assessment / Plan: 
 
Right lower extremity cellulitis / erysipelas, POA 
B/L foot  Diabetic Ulcers POA Sepsis POA secondary to above 
-PCN allergy limits abx choices 
-blood cultures - NGTD. -MRI left LE could not be done as her PPM is not MR conditional 
-leukocytosis has resolved. Leg rash looks about the same, but leg pain is better after switching to rocephin. Continue vanco, monitor renal function closely 
  
Paroxysmal A. Fib Supratherapeutic INR Continue metoprolol INR has come down. Adjusting warfarin 
  
Prolonged QTC 
- as per monitor 
-Avoid QTC prolonging drugs -Monitor electrolytes, replace if needed, added mag. 
  
DM II 
A1c 6.7 in November 
-Continue Lantus 15 Units with SSI AC/HS  
  
History of bilateral breast cancer Essential hypertension Hyperlipidemia Continue home meds 
  
CKD IV 
  
  
Surrogate Decision Lear Annemarie   Code status: Full Prophylaxis: Coumadin Recommended Disposition: Home w/Family  
 
30.0 - 39.9 Obese / Body mass index is 35.9 kg/m².  Subjective: Chief Complaint / Reason for Physician Visit Follow up of cellulitis, PAF, supratherapeutic INR Chart reviewed in detail. Discussed with RN events overnight. Less leg pain Review of Systems: 
Symptom Y/N Comments  Symptom Y/N Comments Fever/Chills    Chest Pain Poor Appetite    Edema Cough    Abdominal Pain Sputum    Joint Pain SOB/CHOW    Pruritis/Rash Nausea/vomit    Tolerating PT/OT Diarrhea    Tolerating Diet Constipation    Other Could NOT obtain due to:   
 
PO intake: No data found. Objective: VITALS:  
Last 24hrs VS reviewed since prior progress note. Most recent are: Patient Vitals for the past 24 hrs: 
 Temp Pulse Resp BP SpO2  
01/18/20 0824 98 °F (36.7 °C) 77 18 170/66 100 % 01/18/20 0351 98.2 °F (36.8 °C) 73 18 156/80 95 % 01/17/20 2319 97.8 °F (36.6 °C) 77 18 150/68 95 % 01/17/20 2215  94  178/72   
01/17/20 2025  92  190/70   
01/17/20 2015 98.3 °F (36.8 °C) 85 18 (!) 215/91 93 % 01/17/20 [address] 183/71   
01/17/20 1631    196/65   
01/17/20 1538 98 °F (36.7 °C) 76 18 181/71 95 % 01/17/20 1203 98.1 °F (36.7 °C) 77 18 199/71 100 % No intake or output data in the 24 hours ending 01/18/20 0846 PHYSICAL EXAM: 
General: WD, WN. Alert, cooperative, no acute distress   
EENT:  EOMI. Anicteric sclerae. MMM Resp:  CTA bilaterally, no wheezing or rales. No accessory muscle use CV:  Regular  rhythm,  No edema GI:  Soft, Non distended, Non tender.  +Bowel sounds Neurologic:  Alert and oriented X 3, normal speech, Psych:   Good insight. Not anxious nor agitated Skin:  (+) left leg rash, violaceous, tender, streaking up inner leg. Borders marked with sharpie Reviewed most current lab test results and cultures  YES Reviewed most current radiology test results   YES Review and summation of old records today    NO Reviewed patient's current orders and MAR    YES 
PMH/SH reviewed - no change compared to H&P 
________________________________________________________________________ Care Plan discussed with: 
  Comments Patient x Family RN x Care Manager Consultant Multidiciplinary team rounds were held today with , nursing, pharmacist and clinical coordinator. Patient's plan of care was discussed; medications were reviewed and discharge planning was addressed. ________________________________________________________________________ Total NON critical care TIME:  20   Minutes Total CRITICAL CARE TIME Spent:   Minutes non procedure based Comments >50% of visit spent in counseling and coordination of care x This includes time during multidisciplinary rounds if indicated above  
________________________________________________________________________ Robert Freeman MD  
 
Procedures: see electronic medical records for all procedures/Xrays and details which were not copied into this note but were reviewed prior to creation of Plan. LABS: 
I reviewed today's most current labs and imaging studies. Pertinent labs include: 
Recent Labs  
  01/18/20 
0325 01/16/20 
0404 WBC 7.1 9.1 HGB 11.5 10.8* HCT 37.1 34.6*  214 Recent Labs  
  01/18/20 
0325 01/17/20 
0325 01/16/20 
0404  137 136  
K 3.2* 3.8 3.8  107 107 CO2 23 24 21 * 139* 135* BUN 26* 33* 34* CREA 1.85* 2.04* 1.99* CA 8.7 8.9 9.1 MG  --   --  2.3 INR 1.5* 1.4* 1.8*

## 2020-01-19 LAB
ANION GAP SERPL CALC-SCNC: 7 MMOL/L (ref 5–15)
BACTERIA SPEC CULT: NORMAL
BUN SERPL-MCNC: 21 MG/DL (ref 6–20)
BUN/CREAT SERPL: 12 (ref 12–20)
CALCIUM SERPL-MCNC: 9 MG/DL (ref 8.5–10.1)
CHLORIDE SERPL-SCNC: 103 MMOL/L (ref 97–108)
CO2 SERPL-SCNC: 26 MMOL/L (ref 21–32)
CREAT SERPL-MCNC: 1.77 MG/DL (ref 0.55–1.02)
GLUCOSE BLD STRIP.AUTO-MCNC: 117 MG/DL (ref 65–100)
GLUCOSE BLD STRIP.AUTO-MCNC: 147 MG/DL (ref 65–100)
GLUCOSE BLD STRIP.AUTO-MCNC: 167 MG/DL (ref 65–100)
GLUCOSE BLD STRIP.AUTO-MCNC: 257 MG/DL (ref 65–100)
GLUCOSE SERPL-MCNC: 202 MG/DL (ref 65–100)
INR PPP: 1.8 (ref 0.9–1.1)
POTASSIUM SERPL-SCNC: 3 MMOL/L (ref 3.5–5.1)
PROTHROMBIN TIME: 17.7 SEC (ref 9–11.1)
SERVICE CMNT-IMP: ABNORMAL
SERVICE CMNT-IMP: NORMAL
SODIUM SERPL-SCNC: 136 MMOL/L (ref 136–145)

## 2020-01-19 PROCEDURE — 74011000258 HC RX REV CODE- 258: Performed by: INTERNAL MEDICINE

## 2020-01-19 PROCEDURE — 74011250636 HC RX REV CODE- 250/636: Performed by: INTERNAL MEDICINE

## 2020-01-19 PROCEDURE — 85610 PROTHROMBIN TIME: CPT

## 2020-01-19 PROCEDURE — 80048 BASIC METABOLIC PNL TOTAL CA: CPT

## 2020-01-19 PROCEDURE — 65270000029 HC RM PRIVATE

## 2020-01-19 PROCEDURE — 36415 COLL VENOUS BLD VENIPUNCTURE: CPT

## 2020-01-19 PROCEDURE — 74011636637 HC RX REV CODE- 636/637: Performed by: INTERNAL MEDICINE

## 2020-01-19 PROCEDURE — 82962 GLUCOSE BLOOD TEST: CPT

## 2020-01-19 PROCEDURE — 74011250637 HC RX REV CODE- 250/637: Performed by: INTERNAL MEDICINE

## 2020-01-19 PROCEDURE — 94760 N-INVAS EAR/PLS OXIMETRY 1: CPT

## 2020-01-19 RX ORDER — METRONIDAZOLE 250 MG/1
500 TABLET ORAL EVERY 12 HOURS
Status: DISCONTINUED | OUTPATIENT
Start: 2020-01-19 | End: 2020-01-19

## 2020-01-19 RX ORDER — POTASSIUM CHLORIDE 750 MG/1
40 TABLET, FILM COATED, EXTENDED RELEASE ORAL
Status: COMPLETED | OUTPATIENT
Start: 2020-01-19 | End: 2020-01-19

## 2020-01-19 RX ORDER — WARFARIN 2.5 MG/1
2.5 TABLET ORAL ONCE
Status: COMPLETED | OUTPATIENT
Start: 2020-01-19 | End: 2020-01-19

## 2020-01-19 RX ADMIN — ONDANSETRON 4 MG: 2 INJECTION INTRAMUSCULAR; INTRAVENOUS at 08:24

## 2020-01-19 RX ADMIN — POTASSIUM CHLORIDE 40 MEQ: 750 TABLET, FILM COATED, EXTENDED RELEASE ORAL at 07:46

## 2020-01-19 RX ADMIN — INSULIN LISPRO 2 UNITS: 100 INJECTION, SOLUTION INTRAVENOUS; SUBCUTANEOUS at 16:01

## 2020-01-19 RX ADMIN — VANCOMYCIN HYDROCHLORIDE 1000 MG: 1 INJECTION, POWDER, LYOPHILIZED, FOR SOLUTION INTRAVENOUS at 09:45

## 2020-01-19 RX ADMIN — METOPROLOL SUCCINATE 100 MG: 50 TABLET, EXTENDED RELEASE ORAL at 09:56

## 2020-01-19 RX ADMIN — INSULIN GLARGINE 15 UNITS: 100 INJECTION, SOLUTION SUBCUTANEOUS at 21:54

## 2020-01-19 RX ADMIN — Medication 10 ML: at 21:10

## 2020-01-19 RX ADMIN — METRONIDAZOLE 500 MG: 250 TABLET ORAL at 09:57

## 2020-01-19 RX ADMIN — BUMETANIDE 2 MG: 1 TABLET ORAL at 09:56

## 2020-01-19 RX ADMIN — BUSPIRONE HYDROCHLORIDE 10 MG: 5 TABLET ORAL at 09:57

## 2020-01-19 RX ADMIN — WARFARIN SODIUM 2.5 MG: 2.5 TABLET ORAL at 17:03

## 2020-01-19 RX ADMIN — BUSPIRONE HYDROCHLORIDE 10 MG: 5 TABLET ORAL at 17:03

## 2020-01-19 RX ADMIN — CLONIDINE HYDROCHLORIDE 0.3 MG: 0.1 TABLET ORAL at 21:09

## 2020-01-19 RX ADMIN — CEFTRIAXONE 1 G: 1 INJECTION, POWDER, FOR SOLUTION INTRAMUSCULAR; INTRAVENOUS at 17:06

## 2020-01-19 RX ADMIN — Medication 10 ML: at 15:16

## 2020-01-19 RX ADMIN — HYDRALAZINE HYDROCHLORIDE 100 MG: 50 TABLET, FILM COATED ORAL at 15:16

## 2020-01-19 RX ADMIN — HYDRALAZINE HYDROCHLORIDE 100 MG: 50 TABLET, FILM COATED ORAL at 06:32

## 2020-01-19 RX ADMIN — INSULIN LISPRO 2 UNITS: 100 INJECTION, SOLUTION INTRAVENOUS; SUBCUTANEOUS at 07:45

## 2020-01-19 RX ADMIN — HYDRALAZINE HYDROCHLORIDE 100 MG: 50 TABLET, FILM COATED ORAL at 21:09

## 2020-01-19 RX ADMIN — INSULIN LISPRO 3 UNITS: 100 INJECTION, SOLUTION INTRAVENOUS; SUBCUTANEOUS at 21:53

## 2020-01-19 NOTE — PROGRESS NOTES
Bedside shift change report given to GURINDER Burnette (oncoming nurse) by Vijay Almeida RN (offgoing nurse). Report included the following information SBAR, Kardex, ED Summary, Procedure Summary, Intake/Output, MAR, Accordion, Recent Results, Med Rec Status and Cardiac Rhythm paced.

## 2020-01-19 NOTE — PROGRESS NOTES
Bedside and Verbal shift change report given to Mustapha (oncoming nurse) by Camila Amaro (offgoing nurse). Report included the following information SBAR, Kardex, Intake/Output, MAR, Recent Results, Med Rec Status and Cardiac Rhythm Paced.

## 2020-01-19 NOTE — PROGRESS NOTES
Hospitalist Progress Note NAME: Priya Sarabia :  1960 MRN:  [id number] Interim Hospital Summary: 60 y.o. female whom presented on 2020 with Assessment / Plan: 
 
Right lower extremity cellulitis / erysipelas, POA 
B/L foot  Diabetic Ulcers POA Sepsis POA secondary to above 
-PCN allergy limits abx choices 
-blood cultures - NGTD. -MRI left LE could not be done as her PPM is not MR conditional 
-leukocytosis has resolved. Leg rash looks better but . Re eval in AM, likely can DC if pain better 
  
Paroxysmal A. Fib Supratherapeutic INR Continue metoprolol INR has come down. Adjusting warfarin 
  
Prolonged QTC 
- as per monitor 
-Avoid QTC prolonging drugs -Monitor electrolytes, replace if needed, added mag. 
  
DM II 
A1c 6.7 in November 
-Continue Lantus 15 Units with SSI AC/HS  
  
History of bilateral breast cancer Essential hypertension Hyperlipidemia Continue home meds 
  
CKD IV 
  
  
Surrogate Decision Irl Dillon   Code status: Full Prophylaxis: Coumadin Recommended Disposition: Home w/Family  
 
30.0 - 39.9 Obese / Body mass index is 35.9 kg/m². Subjective: Chief Complaint / Reason for Physician Visit Follow up of cellulitis, PAF, supratherapeutic INR Chart reviewed in detail. Discussed with RN events overnight. Leg rash better Review of Systems: 
Symptom Y/N Comments  Symptom Y/N Comments Fever/Chills    Chest Pain Poor Appetite    Edema Cough    Abdominal Pain Sputum    Joint Pain SOB/CHOW    Pruritis/Rash y   
Nausea/vomit    Tolerating PT/OT Diarrhea    Tolerating Diet Constipation    Other Could NOT obtain due to:   
 
PO intake: No data found. Objective: VITALS:  
Last 24hrs VS reviewed since prior progress note. Most recent are: 
Patient Vitals for the past 24 hrs: 
 Temp Pulse Resp BP SpO2 01/19/20 0740 98.2 °F (36.8 °C) 97 18 145/73 96 % 01/19/20 0420 97.5 °F (36.4 °C) 98 18 145/71 98 % 01/18/20 2249 98 °F (36.7 °C) (!) 105 18 150/84 98 % 01/18/20 2016 98 °F (36.7 °C) 82 18 172/67 99 % 01/18/20 1646 98.1 °F (36.7 °C) 79 18 184/69 99 % 01/18/20 1526 97.6 °F (36.4 °C) 77 18 (!) 208/99 98 % 01/18/20 1145 98 °F (36.7 °C) 84 18 184/63 96 % No intake or output data in the 24 hours ending 01/19/20 1012 PHYSICAL EXAM: 
General: WD, WN. Alert, cooperative, no acute distress   
EENT:  EOMI. Anicteric sclerae. MMM Resp:  CTA bilaterally, no wheezing or rales. No accessory muscle use CV:  Regular  rhythm,  No edema GI:  Soft, Non distended, Non tender.  +Bowel sounds Neurologic:  Alert and oriented X 3, normal speech, Psych:   Good insight. Not anxious nor agitated Skin:  (+) left leg rash, violaceous, tender, streaking up inner leg. Borders marked with sharpie Reviewed most current lab test results and cultures  YES Reviewed most current radiology test results   YES Review and summation of old records today    NO Reviewed patient's current orders and MAR    YES 
PMH/SH reviewed - no change compared to H&P 
________________________________________________________________________ Care Plan discussed with: 
  Comments Patient x Family RN x Care Manager Consultant Multidiciplinary team rounds were held today with , nursing, pharmacist and clinical coordinator. Patient's plan of care was discussed; medications were reviewed and discharge planning was addressed. ________________________________________________________________________ Total NON critical care TIME:  20   Minutes Total CRITICAL CARE TIME Spent:   Minutes non procedure based Comments >50% of visit spent in counseling and coordination of care x This includes time during multidisciplinary rounds if indicated above ________________________________________________________________________ Jemal Jaffe MD  
 
Procedures: see electronic medical records for all procedures/Xrays and details which were not copied into this note but were reviewed prior to creation of Plan. LABS: 
I reviewed today's most current labs and imaging studies. Pertinent labs include: 
Recent Labs  
  01/18/20 
0325 WBC 7.1 HGB 11.5 HCT 37.1  Recent Labs  
  01/19/20 
0442 01/18/20 
0325 01/17/20 
0325  136 137  
K 3.0* 3.2* 3.8  103 107 CO2 26 23 24 * 132* 139* BUN 21* 26* 33* CREA 1.77* 1.85* 2.04* CA 9.0 8.7 8.9 INR 1.8* 1.5* 1.4*

## 2020-01-19 NOTE — PROGRESS NOTES
Bedside shift change report given to Agustín Lopes RN (oncoming nurse) by Alysha Macias (offgoing nurse). Report included the following information SBAR, Kardex, ED Summary, Procedure Summary, Intake/Output, MAR, Accordion, Recent Results, Med Rec Status and Cardiac Rhythm NSR paced.

## 2020-01-19 NOTE — PROGRESS NOTES
Pharmacy Daily Dosing of Warfarin Indication: a fib Goal INR: 2-3 PTA Warfarin Dose: 4 mg Mon, Thurs and 2 mg on Tues, Wed, Fri, Sat, Sun 
 
Concurrent anticoagulants: none Concurrent antiplatelet: none Major Interacting Medications Drugs that may increase INR:  
Drugs that may decrease INR: n/a Conditions that may increase/decrease INR (CHF, C. diff, cirrhosis, thyroid disorder, hypoalbuminemia):  
 
Labs: 
Recent Labs  
  01/19/20 
0442 01/18/20 
0325 01/17/20 
0325 INR 1.8* 1.5* 1.4* HGB  --  11.5  --   
PLT  --  280  --   
 
Diet:  Diabetic Impression/Plan:  
Vitamin 5mg po x1 given 1/15 @ 0910 Average daily dose PTA = 2.6mg Booster dose provided 1/17 Metronidazole discontinued Warfarin 2.5mg  
Daily INR 
CBC w/o diff QODay Pharmacy will follow daily and adjust the dose as appropriate.  
 
Thank you,  
Stiven Chaney, [facility]

## 2020-01-20 VITALS
TEMPERATURE: 97.7 F | HEART RATE: 66 BPM | SYSTOLIC BLOOD PRESSURE: 169 MMHG | HEIGHT: 70 IN | RESPIRATION RATE: 18 BRPM | WEIGHT: 250.22 LBS | BODY MASS INDEX: 35.82 KG/M2 | OXYGEN SATURATION: 97 % | DIASTOLIC BLOOD PRESSURE: 72 MMHG

## 2020-01-20 LAB
ANION GAP SERPL CALC-SCNC: 7 MMOL/L (ref 5–15)
BUN SERPL-MCNC: 23 MG/DL (ref 6–20)
BUN/CREAT SERPL: 11 (ref 12–20)
CALCIUM SERPL-MCNC: 9 MG/DL (ref 8.5–10.1)
CHLORIDE SERPL-SCNC: 103 MMOL/L (ref 97–108)
CO2 SERPL-SCNC: 27 MMOL/L (ref 21–32)
CREAT SERPL-MCNC: 2.12 MG/DL (ref 0.55–1.02)
DATE LAST DOSE: ABNORMAL
ERYTHROCYTE [DISTWIDTH] IN BLOOD BY AUTOMATED COUNT: 13.9 % (ref 11.5–14.5)
GLUCOSE BLD STRIP.AUTO-MCNC: 165 MG/DL (ref 65–100)
GLUCOSE BLD STRIP.AUTO-MCNC: 181 MG/DL (ref 65–100)
GLUCOSE SERPL-MCNC: 149 MG/DL (ref 65–100)
HCT VFR BLD AUTO: 36.2 % (ref 35–47)
HGB BLD-MCNC: 11.4 G/DL (ref 11.5–16)
INR PPP: 2 (ref 0.9–1.1)
MAGNESIUM SERPL-MCNC: 2.2 MG/DL (ref 1.6–2.4)
MCH RBC QN AUTO: 28.9 PG (ref 26–34)
MCHC RBC AUTO-ENTMCNC: 31.5 G/DL (ref 30–36.5)
MCV RBC AUTO: 91.9 FL (ref 80–99)
NRBC # BLD: 0 K/UL (ref 0–0.01)
NRBC BLD-RTO: 0 PER 100 WBC
PLATELET # BLD AUTO: 311 K/UL (ref 150–400)
PMV BLD AUTO: 10.8 FL (ref 8.9–12.9)
POTASSIUM SERPL-SCNC: 3.3 MMOL/L (ref 3.5–5.1)
PROTHROMBIN TIME: 20.1 SEC (ref 9–11.1)
RBC # BLD AUTO: 3.94 M/UL (ref 3.8–5.2)
REPORTED DOSE,DOSE: ABNORMAL UNITS
REPORTED DOSE/TIME,TMG: 900
SERVICE CMNT-IMP: ABNORMAL
SERVICE CMNT-IMP: ABNORMAL
SODIUM SERPL-SCNC: 137 MMOL/L (ref 136–145)
VANCOMYCIN TROUGH SERPL-MCNC: 24.9 UG/ML (ref 5–10)
WBC # BLD AUTO: 7.5 K/UL (ref 3.6–11)

## 2020-01-20 PROCEDURE — 83735 ASSAY OF MAGNESIUM: CPT

## 2020-01-20 PROCEDURE — 74011250637 HC RX REV CODE- 250/637: Performed by: INTERNAL MEDICINE

## 2020-01-20 PROCEDURE — 80048 BASIC METABOLIC PNL TOTAL CA: CPT

## 2020-01-20 PROCEDURE — 85027 COMPLETE CBC AUTOMATED: CPT

## 2020-01-20 PROCEDURE — 36415 COLL VENOUS BLD VENIPUNCTURE: CPT

## 2020-01-20 PROCEDURE — 94760 N-INVAS EAR/PLS OXIMETRY 1: CPT

## 2020-01-20 PROCEDURE — 80202 ASSAY OF VANCOMYCIN: CPT

## 2020-01-20 PROCEDURE — 85610 PROTHROMBIN TIME: CPT

## 2020-01-20 PROCEDURE — 82962 GLUCOSE BLOOD TEST: CPT

## 2020-01-20 PROCEDURE — 74011636637 HC RX REV CODE- 636/637: Performed by: INTERNAL MEDICINE

## 2020-01-20 PROCEDURE — 74011250636 HC RX REV CODE- 250/636: Performed by: INTERNAL MEDICINE

## 2020-01-20 RX ORDER — DOXYCYCLINE 100 MG/1
100 CAPSULE ORAL 2 TIMES DAILY
Qty: 28 CAP | Refills: 0 | Status: SHIPPED | OUTPATIENT
Start: 2020-01-20 | End: 2020-01-21 | Stop reason: ALTCHOICE

## 2020-01-20 RX ORDER — CEFDINIR 300 MG/1
300 CAPSULE ORAL 2 TIMES DAILY
Qty: 14 CAP | Refills: 0 | Status: SHIPPED | OUTPATIENT
Start: 2020-01-20 | End: 2020-01-27

## 2020-01-20 RX ADMIN — BUSPIRONE HYDROCHLORIDE 10 MG: 5 TABLET ORAL at 08:34

## 2020-01-20 RX ADMIN — METOPROLOL SUCCINATE 100 MG: 50 TABLET, EXTENDED RELEASE ORAL at 08:34

## 2020-01-20 RX ADMIN — HYDRALAZINE HYDROCHLORIDE 100 MG: 50 TABLET, FILM COATED ORAL at 06:14

## 2020-01-20 RX ADMIN — Medication 10 ML: at 06:15

## 2020-01-20 RX ADMIN — Medication 1 CAPSULE: at 08:34

## 2020-01-20 RX ADMIN — BUMETANIDE 2 MG: 1 TABLET ORAL at 08:34

## 2020-01-20 RX ADMIN — INSULIN LISPRO 2 UNITS: 100 INJECTION, SOLUTION INTRAVENOUS; SUBCUTANEOUS at 08:36

## 2020-01-20 RX ADMIN — HYDRALAZINE HYDROCHLORIDE 100 MG: 50 TABLET, FILM COATED ORAL at 13:42

## 2020-01-20 RX ADMIN — VANCOMYCIN HYDROCHLORIDE 1000 MG: 1 INJECTION, POWDER, LYOPHILIZED, FOR SOLUTION INTRAVENOUS at 08:38

## 2020-01-20 RX ADMIN — INSULIN LISPRO 2 UNITS: 100 INJECTION, SOLUTION INTRAVENOUS; SUBCUTANEOUS at 11:57

## 2020-01-20 NOTE — DISCHARGE INSTRUCTIONS
HOSPITALIST DISCHARGE INSTRUCTIONS    NAME: Lydia Pickett   :  1960   MRN:  [id number]     Date/Time:  2020 9:49 AM    ADMIT DATE: 2020   DISCHARGE DATE: 2020         · It is important that you take the medication exactly as they are prescribed. · Keep your medication in the bottles provided by the pharmacist and keep a list of the medication names, dosages, and times to be taken in your wallet. · Do not take other medications without consulting your doctor. What to do at [address]:  Cardiac Diet    Recommended activity: Activity as tolerated      If you have questions regarding the hospital related prescriptions or hospital related issues please call [facility] at [phone]. You can always direct your questions to your primary care doctor if you are unable to reach your hospital physician; your PCP works as an extension of your hospital doctor just like your hospital doctor is an extension of your PCP for your time at the hospital [facility])    If you experience any of the following symptoms then please call your primary care physician or return to the emergency room if you cannot get hold of your doctor:    Fever, chills, nausea, vomiting, or persistent diarrhea  Worsening weakness or new problems with your speech or balance  Dark stools or visible blood in your stools  New Leg swelling or shortness of breath as these could be signs of a clot    Additional Instructions:    PT INR check on  or           Information obtained by :  I understand that if any problems occur once I am at home I am to contact my physician. I understand and acknowledge receipt of the instructions indicated above.                                                                                                                                            Physician's or R.N.'s Signature Date/Time                                                                                                                                              Patient or Representative Signature

## 2020-01-20 NOTE — DISCHARGE SUMMARY
Hospitalist Discharge Summary Patient ID: 
Abad Ordoñez [id number] 
54 y.o. 
1960 PCP on record: Monty Laguerre MD 
 
Admit date: 1/14/2020 Discharge date and time: 1/20/2020 DISCHARGE DIAGNOSIS: 
 
Right lower extremity cellulitis / erysipelas, POA 
B/L foot  Diabetic Ulcers POA Sepsis POA secondary to above Paroxysmal A. Fib Supratherapeutic INR Prolonged QTC 
DM II History of bilateral breast cancer Essential hypertension Hyperlipidemia CKD IV 
 
CONSULTATIONS: 
IP CONSULT TO HOSPITALIST 
IP CONSULT TO PODIATRY Excerpted HPI from H&P of Jacqueline Camacho MD: 
CHIEF COMPLAINT: Right Foot redness 
  
HISTORY OF PRESENT ILLNESS:    
Judah Edgar is a 60 y.o.  female who presents with past medical history of chronic diabetic foot ulcer on both legs, paroxysmal A. fib, CKD stage IV, hypertension, hyperlipidemia presented to emergency department, sent from podiatry office for right leg cellulitis. Patient has a chronic wound on left heel and right heel which are being managed from podiatry. But since last 2 days she has been having redness and warmth of right leg up to thigh. Has been having chills, also had a fever of 103 in ED over here. She went to see her podiatry and she sent here. She reports that she feels better since she came here. Denies any recent trauma, recent any antibiotic use. 
______________________________________________________________________ DISCHARGE SUMMARY/HOSPITAL COURSE:  for full details see H&P, daily progress notes, labs, consult notes. Right lower extremity cellulitis / erysipelas, POA 
B/L foot  Diabetic Ulcers POA Sepsis POA secondary to above 
-PCN allergy limits abx choices 
-blood cultures - NGTD. -MRI left LE could not be done as her PPM is not MR conditional 
-leukocytosis has resolved. Leg rash looks better and is less tender.    Transition from rocephin vanco to omnicef / doxycycline 
  
 Paroxysmal A. Fib Supratherapeutic INR Continue metoprolol INR has come down. Advised to cut back her warfarin dose to just 2mg daily and have INR check in 4 days 
  
Prolonged QTC 
- as per monitor 
-Avoid QTC prolonging drugs 
  
DM II 
A1c 6.7 in November 
-continue PTA regimen 
  
History of bilateral breast cancer Essential hypertension Hyperlipidemia Continue home meds 
  
CKD IV 
 
_______________________________________________________________________ Patient seen and examined by me on discharge day. Pertinent Findings: 
Gen:    Not in distress Chest: Clear lungs CVS:   Regular rhythm. No edema Abd:  Soft, not distended, not tender Neuro:  Alert, Oriented x 4, grossly non focal exam 
_______________________________________________________________________ DISCHARGE MEDICATIONS:  
Current Discharge Medication List  
  
START taking these medications Details  
cefdinir (OMNICEF) 300 mg capsule Take 1 Cap by mouth two (2) times a day for 7 days. Qty: 14 Cap, Refills: 0  
  
doxycycline (MONODOX) 100 mg capsule Take 1 Cap by mouth two (2) times a day for 14 days. Qty: 28 Cap, Refills: 0 CONTINUE these medications which have NOT CHANGED Details  
acetaminophen 500 mg tab 500 mg, diphenhydrAMINE 25 mg cap 25 mg Take 2 Doses by mouth nightly. warfarin (COUMADIN) 4 mg tablet Take 1 tablet on Mon and Thurs and a half tablet the other 5 days. Qty: 30 Tab, Refills: 5 Associated Diagnoses: PAF (paroxysmal atrial fibrillation) ([address] potassium chloride SR (KLOR-CON 10) 10 mEq tablet Take 10 mEq by mouth daily. Indications: Patient states she intends to take this medication untl she discusses with Dr. Pratima Fairbanks on 12-12-19. insulin glargine (LANTUS U-100 INSULIN) 100 unit/mL injection Inject 20 units daily 
Qty: 10 mL, Refills: 5  
  
hydrALAZINE (APRESOLINE) 100 mg tablet TAKE ONE TABLET BY MOUTH THREE TIMES A DAY Qty: 90 Tab, Refills: 11  
  
 sertraline (ZOLOFT) 50 mg tablet TAKE ONE AND ONE-HALF (1 & 1/2) TABLET BY MOUTH DAILY Qty: 45 Tab, Refills: 5 Associated Diagnoses: Anxiety as acute reaction to gross stress  
  
bumetanide (BUMEX) 1 mg tablet Take 2 mg by mouth daily. cloNIDine HCl (CATAPRES) 0.3 mg tablet Take 0.6 mg by mouth nightly. metoprolol succinate (TOPROL-XL) 100 mg tablet TAKE TWO TABLETS BY MOUTH DAILY Qty: 60 Tab, Refills: 9  
  
doxazosin (CARDURA) 4 mg tablet TAKE ONE TABLET BY MOUTH ONCE NIGHTLY Qty: 30 Tab, Refills: 10  
 Associated Diagnoses: Essential hypertension  
  
busPIRone (BUSPAR) 10 mg tablet TAKE ONE TABLET BY MOUTH TWICE A DAY Qty: 60 Tab, Refills: 10  
 Associated Diagnoses: Anxiety as acute reaction to gross stress  
  
metolazone (ZAROXOLYN) 5 mg tablet Take 1 Tab by mouth daily as needed (take if develop increased LE edema, weight gain > 5 pounds. ). Qty: 30 Tab, Refills: 1 cholecalciferol, VITAMIN D3, (VITAMIN D3) 5,000 unit tab tablet Take 5,000 Units by mouth daily. vitamin A 8,000 unit capsule Take 8,000 Units by mouth daily. insulin lispro (HUMALOG) 100 unit/mL kwikpen 2 units with dinner for sugars over 200 Qty: 1 Package, Refills: 0  
  
cyanocobalamin (VITAMIN B-12) 1,000 mcg sublingual tablet Take 1,000 mcg by mouth daily. My Recommended Diet, Activity, Wound Care, and follow-up labs are listed in the patient's Discharge Insturctions which I have personally completed and reviewed. Risk of deterioration: Low 
 
Condition at Discharge:  Stable 
_____________________________________________________________________ Disposition Home with family, no needs 
____________________________________________________________________ Care Plan discussed with:  
Patient, Family, RN, Care Manager 
 
____________________________________________________________________ Code Status: Full Code 
____________________________________________________________________ Condition at Discharge:  Stable 
_____________________________________________________________________ Follow up with: PCP : Ever Ramirez MD 
Follow-up Information Follow up With Specialties Details Why Contact Info Ever Ramirez MD Internal Medicine In 4 days  [address] [place] [address] [phone] Total time in minutes spent coordinating this discharge (includes going over instructions, follow-up, prescriptions, and preparing report for sign off to her PCP) :  35 minutes Signed: 
Rizwana Chen MD

## 2020-01-20 NOTE — ROUTINE PROCESS
The following appointments have been successfully scheduled: 
 
Date/time Friday, January 24, 2020 01:45 PM 
Patient  Demarco Muse 1960 ([id number] [id number]) #[id number] P#[id number] Department [facility] OFFICE-PCP Appointment type Transitional Care Provider Quirino Turner

## 2020-01-20 NOTE — PROGRESS NOTES
Bedside and Verbal shift change report given to Hina Mckinnon (oncoming nurse) by Helga Pulido (offgoing nurse). Report included the following information SBAR, Kardex, ED Summary, Procedure Summary, Intake/Output, MAR, Accordion, Recent Results and Cardiac Rhythm nsr.

## 2020-01-20 NOTE — PROGRESS NOTES
Bedside and Verbal shift change report given to [employer] (oncoming nurse) by Claudia Moreira RN (offgoing nurse). Report included the following information SBAR, Kardex, Procedure Summary, Intake/Output, MAR and Recent Results.

## 2020-01-21 ENCOUNTER — PATIENT OUTREACH (OUTPATIENT)
Dept: INTERNAL MEDICINE CLINIC | Facility: CLINIC | Age: 60
End: 2020-01-21

## 2020-01-21 RX ORDER — DOXYCYCLINE 100 MG/1
100 CAPSULE ORAL 2 TIMES DAILY
COMMUNITY
Start: 2020-01-21 | End: 2020-02-04

## 2020-01-21 NOTE — Clinical Note
Doxycycline 100mg caps was removed from patient's medication list in error as patient initially states she had completed this medication.  Patient then realized she is currently taking doxycycline 100mg two times per day, and doxycycline was added back to patient's medication list.

## 2020-01-22 NOTE — PROGRESS NOTES
Hospital Discharge Follow-Up Date/Time:  1/21/2020 3:31 PM 
PURNIMA Iniguez CeaN, RN Care Transitions Nurse [address] Team 
Phone: [phone] Patient was admitted to [facility] on 1-14-20 and discharged on 1-20-20 for: 
 
DISCHARGE DIAGNOSIS: 
Right lower extremity cellulitis / erysipelas, POA 
B/L foot  Diabetic Ulcers POA Sepsis POA secondary to above Paroxysmal A. Fib Supratherapeutic INR Prolonged QTC 
DM II History of bilateral breast cancer Essential hypertension Hyperlipidemia [place] [id number] [facility] physician discharge summary was available at the time of outreach. Patient was contacted within one business day of discharge. Challenges reviewed with the provider:  
- Patient missed wound care clinic appointment on 1-21-20; however plans to resume appointments on 1-28-20. 
- Patient declined home health services for wound care and is changing dressings to her bilateral feet herself every other day, in addition to attending wound care clinic appointments. - Patient complains of \"arthritis and both knees hurt today. \" Rates pain at a #3 (1-10 scale) at this time. 
- Reports ongoing fatigue, no fever/chills since discharge, no other red flags to report at this time.  
- Reports swelling in bilateral feet and ankles, states she is using elevation with minimal success. Advance Care Planning:  
Does patient have an Advance Directive:  reviewed and current Component Latest Ref Rng & Units 1/20/2020  
 
      4:07 AM  
HGB 
    11.5 - 16.0 g/dL 11.4 (L) Component Latest Ref Rng & Units 1/20/2020 1/19/2020  
 
      4:07 AM  4:42 AM  
Potassium 3.5 - 5.1 mmol/L 3.3 (L) 3.0 (L) Component Latest Ref Rng & Units 1/18/2020  
 
      3:25 AM  
Potassium 3.5 - 5.1 mmol/L 3.2 (L) Component Latest Ref Rng & Units 1/20/2020 1/19/2020  
 
      4:07 AM  4:42 AM  
Glucose 65 - 100 mg/dL 149 (H) 202 (H) BUN 
    6 - 20 MG/DL 23 (H) 21 (H) Creatinine 
    0.55 - 1.02 MG/DL 2.12 (H) 1.77 (H) BUN/Creatinine ratio 12 - 20   11 (L) 12 GFR est AA 
    >60 ml/min/1.73m2 29 (L) 35 (L)  
GFR est non-AA 
    >60 ml/min/1.73m2 24 (L) 29 (L) Component Latest Ref Rng & Units 2020  
 
      3:25 AM  
Glucose 65 - 100 mg/dL 132 (H) BUN 
    6 - 20 MG/DL 26 (H) Creatinine 
    0.55 - 1.02 MG/DL 1.85 (H) BUN/Creatinine ratio 12 - 20   14 GFR est AA 
    >60 ml/min/1.73m2 34 (L)  
GFR est non-AA 
    >60 ml/min/1.73m2 28 (L) Component Latest Ref Rng & Units 2020  
 
      8:30 AM  
Vancomycin,trough 5.0 - 10.0 ug/mL 24.9 (HH) Component Latest Ref Rng & Units 2020  
 
      4:07 AM  4:42 AM  
INR 
    0.9 - 1.1   2.0 (H) 1.8 (H) Prothrombin time 9.0 - 11.1 sec 20.1 (H) 17.7 (H) Component Latest Ref Rng & Units 2020  
 
      3:25 AM  
INR 
    0.9 - 1.1   1.5 (H) Prothrombin time 9.0 - 11.1 sec 14.7 (H) Component Latest Ref Rng & Units 2020  
 
      3:25 AM  
INR 
    0.9 - 1.1   1.4 (H) Prothrombin time 9.0 - 11.1 sec 14.0 (H) Component Latest Ref Rng & Units 2020 1/15/2020  
 
      4:04 AM  5:02 AM  
INR 
    0.9 - 1.1   1.8 (H) 9.9 (HH) Prothrombin time 9.0 - 11.1 sec 18.0 (H) 89.6 (H) Component Latest Ref Rng & Units 2020  
 
      6:09 PM  
Sed rate, automated 0 - 30 mm/hr 68 (H) Component Latest Ref Rng & Units 2020  
 
      6:20 PM  
Lactic Acid (POC) 
    0.40 - 2.00 mmol/L 3.05 () Method of communication with provider :chart routing Was this a readmission? no  
Patient stated reason for the readmission: n/a Care Transition Nurse (CTN) contacted the patient by telephone to perform post hospital discharge assessment.  Verified name and  with patient as identifiers. Provided introduction to self, and explanation of the CTN role. Patient received hospital discharge instructions. CTN reviewed discharge instructions and red flags with patient who verbalized understanding. Patient given an opportunity to ask questions and does not have any further questions or concerns at this time. The patient agrees to contact the PCP office for questions related to their healthcare. CTN provided contact information for future reference. Disease Specific:   Sepsis Patients top risk factors for readmission:  level of motivation, medical condition, utilization of services. Patient presents with low level of motivation today, declined home health services and missed wound care clinic appointment on 1-21-20. Home Health orders at discharge: Patient declined home health services prior to discharge and again today during [facility] [place] phone call. [address]: n/a Date of initial visit: n/a Durable Medical Equipment ordered at discharge: none [address]: n/a Durable Medical Equipment received: n/a Medications per [id number] [facility] Discharge Summary dated 1-21-20:  
New Medications at Discharge: START taking these medications  
  Details  
cefdinir (OMNICEF) 300 mg capsule Take 1 Cap by mouth two (2) times a day for 7 days. Qty: 14 Cap, Refills: 0  
   
doxycycline (MONODOX) 100 mg capsule Take 1 Cap by mouth two (2) times a day for 14 days. Qty: 28 Cap, Refills: 0  
   
Changed Medications at Discharge: none Discontinued Medications at Discharge: none Medication reconciliation was performed with patient, who verbalizes understanding of administration of home medications. There were no barriers to obtaining medications identified at this time and patient reports her medications are affordable. Referral to Pharm D needed: no  
 
Current Outpatient Medications Medication Sig  
  doxycycline (MONODOX) 100 mg capsule Take 100 mg by mouth two (2) times a day.  cefdinir (OMNICEF) 300 mg capsule Take 1 Cap by mouth two (2) times a day for 7 days.  acetaminophen 500 mg tab 500 mg, diphenhydrAMINE 25 mg cap 25 mg Take 2 Doses by mouth nightly.  warfarin (COUMADIN) 4 mg tablet Take 1 tablet on Mon and Thurs and a half tablet the other 5 days.  potassium chloride SR (KLOR-CON 10) 10 mEq tablet Take 10 mEq by mouth daily. Indications: Patient states she intends to take this medication untl she discusses with Dr. Pia Ramirez on 12-12-19.  insulin glargine (LANTUS U-100 INSULIN) 100 unit/mL injection Inject 20 units daily  hydrALAZINE (APRESOLINE) 100 mg tablet TAKE ONE TABLET BY MOUTH THREE TIMES A DAY  sertraline (ZOLOFT) 50 mg tablet TAKE ONE AND ONE-HALF (1 & 1/2) TABLET BY MOUTH DAILY  bumetanide (BUMEX) 1 mg tablet Take 2 mg by mouth daily.  cloNIDine HCl (CATAPRES) 0.3 mg tablet Take 0.6 mg by mouth nightly.  metoprolol succinate (TOPROL-XL) 100 mg tablet TAKE TWO TABLETS BY MOUTH DAILY  doxazosin (CARDURA) 4 mg tablet TAKE ONE TABLET BY MOUTH ONCE NIGHTLY  busPIRone (BUSPAR) 10 mg tablet TAKE ONE TABLET BY MOUTH TWICE A DAY  metolazone (ZAROXOLYN) 5 mg tablet Take 1 Tab by mouth daily as needed (take if develop increased LE edema, weight gain > 5 pounds. ).  cholecalciferol, VITAMIN D3, (VITAMIN D3) 5,000 unit tab tablet Take 5,000 Units by mouth daily.  vitamin A 8,000 unit capsule Take 8,000 Units by mouth daily.  insulin lispro (HUMALOG) 100 unit/mL kwikpen 2 units with dinner for sugars over 200  cyanocobalamin (VITAMIN B-12) 1,000 mcg sublingual tablet Take 1,000 mcg by mouth daily. No current facility-administered medications for this visit. [facility] follow up appointment(s):  
Future Appointments Date Time Provider Gómez Alexandrea 1/24/2020  1:45 PM Beth Mcnair  [facility]. [place]  
 1/28/2020  2:30 PM Kira Trent DPM [facility]  
2/28/2020 11:00 AM Myriam Rhodes  [facility]. [facility] follow up appointment(s): None noted at this time. [facility]:  out of service area Goals  Attends follow-up appointments as directed. 12-11-19: Patient has TEE appointment with Dr. Abby Rodriguez. Cherry Jones on 12-12-19 and patient states she has resumed wound care at the [address] [place] [address] and her next visit is scheduled for 12-13-19. Patient states she plans to visit the wound care clinic at least once per week. Radha Thomas  
 
1-21-20: Patient was readmitted to [facility] from 1-14-20 to 1-20-20. Patient did attend appointment with Dr. Margi Ryder on 12-12-29 as planned and patient has another [facility] appointment scheduled with Dr. Nicholas Acevedo on 1-24-20. Patient states she declined home health services prior to discharge on 1-20-20 and remains firm that she does not want home health services at this time. Patient states she attends the [facility] wound care clinic on Tuesdays; however she did miss her appointment at the wound care clinic today, 1-21-20. Kojo Orr Understands red flags post discharge. 12-11-19: Red flags of cellulitis reviewed with patient and patient verbalizes an understanding. Patient has no red flags to report at this time and states she will be resuming care at the [facility] on 12-13-19. Patient states she does her own wound care on the days that she will not be visiting the wound care clinic and she plans to visit the clinic at least once per week. Patient reports she has an ulcer on her right and left foot. Patient reports she is taking Levaquin 750mg as ordered and has not missed any doses. Care Transitions Nurse will review red flags again on next phone conversation with patient. DAY  
 
1-21-20: Red flags of cellulitis reviewed with patient and patient verbalizes an understanding.  Patient states she declined home health services prior to discharge on 1-20-20 and patient continues to decline home health services for wound care at this time. Patient states she attends the [facility] on Tuesdays for dressing changes and changes the dressings to her bilateral feet every other day herself. Patient states she did miss her 1-21-20 appointment at the wound care center but plans to resume appointments on 1-28-20. Patient states she has had no fever/chills since discharge and is taking antibiotic as ordered, and has not missed any doses. Patient states she has no red flags to report at this time and Care Transitions Nurse will review red flags again on next phone conversation with patient.  Marcia Leon

## 2020-01-23 PROBLEM — A41.9 SEPSIS (HCC): Status: RESOLVED | Noted: 2020-01-14 | Resolved: 2020-01-23

## 2020-01-24 ENCOUNTER — OFFICE VISIT (OUTPATIENT)
Dept: INTERNAL MEDICINE CLINIC | Facility: CLINIC | Age: 60
End: 2020-01-24

## 2020-01-24 VITALS
OXYGEN SATURATION: 98 % | SYSTOLIC BLOOD PRESSURE: 144 MMHG | HEIGHT: 70 IN | WEIGHT: 246 LBS | DIASTOLIC BLOOD PRESSURE: 62 MMHG | BODY MASS INDEX: 35.22 KG/M2 | RESPIRATION RATE: 12 BRPM | TEMPERATURE: 97.4 F | HEART RATE: 90 BPM

## 2020-01-24 DIAGNOSIS — I83.019 VENOUS STASIS ULCER OF RIGHT LOWER LEG WITH EDEMA OF RIGHT LOWER LEG (HCC): ICD-10-CM

## 2020-01-24 DIAGNOSIS — L97.919 VENOUS STASIS ULCER OF RIGHT LOWER LEG WITH EDEMA OF RIGHT LOWER LEG (HCC): ICD-10-CM

## 2020-01-24 DIAGNOSIS — E11.621 DIABETIC ULCER OF LEFT HEEL ASSOCIATED WITH TYPE 2 DIABETES MELLITUS, WITH NECROSIS OF MUSCLE (HCC): ICD-10-CM

## 2020-01-24 DIAGNOSIS — I48.0 PAF (PAROXYSMAL ATRIAL FIBRILLATION) (HCC): ICD-10-CM

## 2020-01-24 DIAGNOSIS — Z71.89 ADVANCE DIRECTIVE DISCUSSED WITH PATIENT: ICD-10-CM

## 2020-01-24 DIAGNOSIS — E11.621 DIABETIC ULCER OF RIGHT MIDFOOT ASSOCIATED WITH TYPE 2 DIABETES MELLITUS, WITH FAT LAYER EXPOSED (HCC): ICD-10-CM

## 2020-01-24 DIAGNOSIS — R60.9 VENOUS STASIS ULCER OF RIGHT LOWER LEG WITH EDEMA OF RIGHT LOWER LEG (HCC): ICD-10-CM

## 2020-01-24 DIAGNOSIS — L03.115 CELLULITIS OF RIGHT LOWER EXTREMITY: Primary | ICD-10-CM

## 2020-01-24 DIAGNOSIS — L97.412 DIABETIC ULCER OF RIGHT MIDFOOT ASSOCIATED WITH TYPE 2 DIABETES MELLITUS, WITH FAT LAYER EXPOSED (HCC): ICD-10-CM

## 2020-01-24 DIAGNOSIS — L97.423 DIABETIC ULCER OF LEFT HEEL ASSOCIATED WITH TYPE 2 DIABETES MELLITUS, WITH NECROSIS OF MUSCLE (HCC): ICD-10-CM

## 2020-01-24 DIAGNOSIS — I83.891 VENOUS STASIS ULCER OF RIGHT LOWER LEG WITH EDEMA OF RIGHT LOWER LEG (HCC): ICD-10-CM

## 2020-01-24 DIAGNOSIS — E66.01 SEVERE OBESITY (BMI 35.0-39.9) WITH COMORBIDITY (HCC): ICD-10-CM

## 2020-01-24 DIAGNOSIS — Z79.01 LONG TERM (CURRENT) USE OF ANTICOAGULANTS: ICD-10-CM

## 2020-01-24 LAB
INR BLD: 3.2
PT POC: 38.7 SECONDS
VALID INTERNAL CONTROL?: YES

## 2020-01-24 RX ORDER — WARFARIN 4 MG/1
TABLET ORAL
Qty: 30 TAB | Refills: 5 | Status: SHIPPED | OUTPATIENT
Start: 2020-01-24 | End: 2020-02-11

## 2020-01-24 NOTE — PROGRESS NOTES
Francisco Weiss  Identified pt with two pt identifiers(name and ). Chief Complaint [facility] presents with  
[facility] In preparation for visit and have obtained necessary documentation. Has info on advanced directive but has not filled them out. 1. Have you been to the ER, urgent care clinic or hospitalized since your last visit? [address] 
 
2. Have you seen or consulted any other health care providers outside of the [address] [place] [address] since your last visit? Include any pap smears or colon screening. Wound center Vitals reviewed with provider. Health Maintenance reviewed:  
 
Health Maintenance Due Topic  
 EYE EXAM RETINAL OR DILATED  COLONOSCOPY Wt Readings from Last 3 Encounters:  
20 246 lb (111.6 kg) 20 250 lb 3.6 oz (113.5 kg) 19 263 lb (119.3 kg) Temp Readings from Last 3 Encounters:  
20 97.4 °F (36.3 °C) (Oral) 20 97.7 °F (36.5 °C)  
20 99.8 °F (37.7 °C) BP Readings from Last 3 Encounters:  
20 154/72  
20 169/72  
20 178/75 Pulse Readings from Last 3 Encounters:  
20 90  
20 66  
20 77 Vitals:  
 20 1412 BP: 154/72 Pulse: 90 Resp: 12 Temp: 97.4 °F (36.3 °C) TempSrc: Oral  
SpO2: 98% Weight: 246 lb (111.6 kg) Height: 5' 10\" (1.778 m) PainSc:   4 PainLoc: Back Learning Assessment:  
:  
 
 
Learning Assessment 3/25/2014 PRIMARY LEARNER Patient HIGHEST LEVEL OF EDUCATION - PRIMARY LEARNER  2 YEARS OF COLLEGE  
BARRIERS PRIMARY LEARNER NONE  
CO-LEARNER CAREGIVER No  
PRIMARY LANGUAGE ENGLISH  NEED No  
LEARNER PREFERENCE PRIMARY DEMONSTRATION  
LEARNING SPECIAL TOPICS Does does a pacemaker any noise ANSWERED BY patient RELATIONSHIP SELF Depression Screening:  
:  
 
 
3 most recent PHQ Screens 2020 Little interest or pleasure in doing things Not at all Feeling down, depressed, irritable, or hopeless Not at all Total Score PHQ 2 0 Fall Risk Assessment:  
:  
 
 
Fall Risk Assessment, last 12 mths 8/15/2019 Able to walk? Yes Fall in past 12 months? No  
  
 
Abuse Screening:  
:  
 
 
Abuse Screening Questionnaire 8/15/2019 Do you ever feel afraid of your partner? Oneyda Mcgovern Are you in a relationship with someone who physically or mentally threatens you? Oneyda Mcgovern Is it safe for you to go home? Y  
  
 
ADL Screening:  
:  
 
 

## 2020-01-24 NOTE — ACP (ADVANCE CARE PLANNING)
With patient's permission advance care planning discussed. Health Care Agent would be lindsay. First Alternate Health Care Agent would be cousin Kymberly Plascencia. She wants no life prolonging treatment if death is imminent. She wants no life prolonging treatment if there is overwhelming, permanent neurologic injury. Paperwork completed today Conversation took 4 minutes.

## 2020-01-24 NOTE — PROGRESS NOTES
HISTORY OF PRESENT ILLNESS Scott Calles is a 60 y.o. female. HPI  Ms. Conrad Godoy is a 60y.o. year old female, she is seen today for Transition of Care services following a hospital discharge for cellulitis on Jan 20. Our office Nurse Navigator performed an outreach to Ms. Suraj Faulkner on Jan 21 (within 2 business days of discharge) to complete medication reconciliation and a telephonic assessment of her condition. She was admitted on Jan 14 sent from podiatrist with redness and swelling in right leg to level of thigh, fever to 103 and chills. She diabetic ulcers on both feet. She had been treated for right  leg cellulitis in December as well. She was treated with Vancomycin, metronidazole and cefepime. Cefepime was replaced with ceftriaxone. She was discharged with doxycycline and cefdinir. Merritt Spies She has nausea from antibiotics. Pain and redness in leg are gone. No fever or chills. Swelling is less than usual.  
BS in evening are 109-120. Had cast on left foot and twisted back while walking. Using heat with some improvement. Has upcoming would care appointment. .  
Has lost 17 lbs since mid Dec.  
Patient Active Problem List  
Diagnosis Code  History of breast cancer Z85.3  Asthma J45.909  Fe deficiency anemia D50.9  Status post ablation of atrial fibrillation Z98.890, Z86.79  
 Sick sinus syndrome ([facility]) I49.5  S/P cardiac pacemaker procedure Z95.0  Long term (current) use of anticoagulants Z79.01  
 Mixed hyperlipidemia E78.2  CKD (chronic kidney disease), stage IV ([facility]) N18.4  Systolic murmur A04.1  Essential hypertension I10  
 PAF (paroxysmal atrial fibrillation) ([facility]) I48.0  Anemia in stage 4 chronic kidney disease ([facility]) N18.4, D63.1  Controlled type 2 diabetes mellitus with stage 4 chronic kidney disease, with long-term current use of insulin ([facility]) E11.22, N18.4, Z79.4  Morbid obesity with BMI of 40.0-44.9, adult ([facility]) E66.01, Z68.41  
 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  
 Diabetic ulcer of right midfoot associated with type 2 diabetes mellitus, with fat layer exposed ([facility] E11.621, J05.622  Cellulitis of right lower extremity L03.115  Foot deformity, acquired, left M21.962  Foot deformity, right M21.961  Pes cavus Q66.70  Type 2 diabetes mellitus with left diabetic foot infection (HCC) E11.628, L08.9 Past Medical History:  
Diagnosis Date  Acquired lymphedema Right arm  Arrhythmia  Asthma  Atrial fibrillation ([facility] Dr. Daria Cueto and Dr. Gabriele Hale [facility])  Cancer ([facility] bilateral breast  
 Chronic kidney disease  Diabetes mellitus type II   
 Dizziness  Essential hypertension  Hyperlipidemia  Hypertension  Long term (current) use of anticoagulants  Morbid obesity ([facility] 3/14/2012  Osteoarthritis  Pacemaker  Sick sinus syndrome ([facility] Past Surgical History:  
Procedure Laterality Date  ABDOMEN SURGERY PROC UNLISTED    
 gastric bypass  GASTRIC BYPASS,OBESE<150CM SAW-EN-Y  7/08  
 [place]  HX AFIB ABLATION    
 HX CARPAL TUNNEL RELEASE [address] [id number] [address] RIGHT MODIFIED RADICAL   
 HX MOHS PROCEDURES  1995  
 right  HX ORTHOPAEDIC    
 ight knee surgery  HX PACEMAKER    
 IR INSERT TUNL CVC W PORT OVER 5 YEARS    
 [address]  NEEDLE BIOPSY LIVER,W OTHR PROC  8.21.07  
[address] AND 1994  [place] TRABECULOPLASTY BY LASER SURGERY    
 US GUIDE ASP OVARIAN CYST ABD APPROACH  8.21.07  
 RIGHT Social History Socioeconomic History  Marital status:  Spouse name: Not on file  Number of children: Not on file  Years of education: Not on file  Highest education level: Not on file Tobacco Use  Smoking status: Never Smoker  Smokeless tobacco: Never Used Substance and Sexual Activity  Alcohol use: Yes Comment: rare  Drug use: No  
 
Family History Problem Relation Age of Onset  Cancer Mother  Heart Disease Mother  Alcohol abuse Father  Diabetes Brother  Hypertension Brother Allergies Allergen Reactions  Ace Inhibitors Cough  Amlodipine Swelling  Avapro [Irbesartan] Unable to Obtain  Bactrim [Sulfamethoxazole-Trimethoprim] Other (comments) Sore mouth  Captopril Cough  Carvedilol Diarrhea [address]  Morphine Nausea and Vomiting and Swelling  Penicillins Hives  Pravachol [Pravastatin] Nausea Only  Seafood [Shellfish Containing Products] Hives  Singulair [Montelukast] Other (comments)  
  palpitations Current Outpatient Medications Medication Sig Dispense Refill  doxycycline (MONODOX) 100 mg capsule Take 100 mg by mouth two (2) times a day.  cefdinir (OMNICEF) 300 mg capsule Take 1 Cap by mouth two (2) times a day for 7 days. 14 Cap 0  
 acetaminophen 500 mg tab 500 mg, diphenhydrAMINE 25 mg cap 25 mg Take 2 Doses by mouth nightly.  warfarin (COUMADIN) 4 mg tablet Take 1 tablet on Mon and Thurs and a half tablet the other 5 days. 30 Tab 5  potassium chloride SR (KLOR-CON 10) 10 mEq tablet Take 10 mEq by mouth daily. Indications: Patient states she intends to take this medication untl she discusses with Dr. Dion Rae on 12-12-19.  insulin glargine (LANTUS U-100 INSULIN) 100 unit/mL injection Inject 20 units daily 10 mL 5  
 hydrALAZINE (APRESOLINE) 100 mg tablet TAKE ONE TABLET BY MOUTH THREE TIMES A DAY 90 Tab 11  
 sertraline (ZOLOFT) 50 mg tablet TAKE ONE AND ONE-HALF (1 & 1/2) TABLET BY MOUTH DAILY 45 Tab 5  
 bumetanide (BUMEX) 1 mg tablet Take 2 mg by mouth daily.  cloNIDine HCl (CATAPRES) 0.3 mg tablet Take 0.6 mg by mouth nightly.  metoprolol succinate (TOPROL-XL) 100 mg tablet TAKE TWO TABLETS BY MOUTH DAILY 60 Tab 9  
 doxazosin (CARDURA) 4 mg tablet TAKE ONE TABLET BY MOUTH ONCE NIGHTLY 30 Tab 10  
 busPIRone (BUSPAR) 10 mg tablet TAKE ONE TABLET BY MOUTH TWICE A DAY 60 Tab 10  
 metolazone (ZAROXOLYN) 5 mg tablet Take 1 Tab by mouth daily as needed (take if develop increased LE edema, weight gain > 5 pounds. ). 30 Tab 1  cholecalciferol, VITAMIN D3, (VITAMIN D3) 5,000 unit tab tablet Take 5,000 Units by mouth daily.  vitamin A 8,000 unit capsule Take 8,000 Units by mouth daily.  insulin lispro (HUMALOG) 100 unit/mL kwikpen 2 units with dinner for sugars over 200 1 Package 0  
 cyanocobalamin (VITAMIN B-12) 1,000 mcg sublingual tablet Take 1,000 mcg by mouth daily. ROS Visit Vitals /62 (BP 1 Location: Left arm, BP Patient Position: Sitting) Pulse 90 Temp 97.4 °F (36.3 °C) (Oral) Resp 12 Ht 5' 10\" (1.778 m) Wt 246 lb (111.6 kg) SpO2 98% BMI 35.30 kg/m² Physical Exam 
Vitals signs and nursing note reviewed. Constitutional:   
   Appearance: Normal appearance. HENT:  
   Head: Normocephalic and atraumatic. Mouth/Throat:  
   Mouth: Mucous membranes are moist.  
Eyes:  
   Conjunctiva/sclera: Conjunctivae normal.  
Neck: Musculoskeletal: Neck supple. Cardiovascular:  
   Rate and Rhythm: Normal rate and regular rhythm. Heart sounds: Normal heart sounds. Heart sounds not distant. No murmur. No gallop. Pulmonary: Effort: Pulmonary effort is normal.  
   Breath sounds: Normal breath sounds and air entry. No wheezing, rhonchi or rales. Musculoskeletal:  
   Right lower leg: No edema. Left lower leg: No edema. Lymphadenopathy:  
   Cervical: No cervical adenopathy. Skin: 
   General: Skin is warm and dry. Findings: No rash. Neurological:  
   Mental Status: She is alert and oriented to person, place, and time. Psychiatric:     
   Mood and Affect: Mood normal.     
   Behavior: Behavior normal. Behavior is cooperative. Results for orders placed or performed in visit on 01/24/20 AMB POC PT/INR Result Value Ref Range VALID INTERNAL CONTROL POC Yes Prothrombin time (POC) 38.7 seconds INR POC 3.2 Lab Results Component Value Date/Time WBC 7.5 01/20/2020 04:07 AM  
 Hemoglobin (POC) 13.3 09/09/2011 08:34 AM  
 HGB 11.4 (L) 01/20/2020 04:07 AM  
 Hematocrit (POC) 39 09/09/2011 08:34 AM  
 HCT 36.2 01/20/2020 04:07 AM  
 PLATELET 666 12/63/1960 04:07 AM  
 MCV 91.9 01/20/2020 04:07 AM  
 
Lab Results Component Value Date/Time Sodium 137 01/20/2020 04:07 AM  
 Potassium 3.3 (L) 01/20/2020 04:07 AM  
 Chloride 103 01/20/2020 04:07 AM  
 CO2 27 01/20/2020 04:07 AM  
 Anion gap 7 01/20/2020 04:07 AM  
 Glucose 149 (H) 01/20/2020 04:07 AM  
 BUN 23 (H) 01/20/2020 04:07 AM  
 Creatinine 2.12 (H) 01/20/2020 04:07 AM  
 BUN/Creatinine ratio 11 (L) 01/20/2020 04:07 AM  
 GFR est AA 29 (L) 01/20/2020 04:07 AM  
 GFR est non-AA 24 (L) 01/20/2020 04:07 AM  
 Calcium 9.0 01/20/2020 04:07 AM  
 Bilirubin, total 0.4 01/14/2020 06:09 PM  
 AST (SGOT) 20 01/14/2020 06:09 PM  
 Alk. phosphatase 123 (H) 01/14/2020 06:09 PM  
 Protein, total 8.4 (H) 01/14/2020 06:09 PM  
 Albumin 3.5 01/14/2020 06:09 PM  
 Globulin 4.9 (H) 01/14/2020 06:09 PM  
 A-G Ratio 0.7 (L) 01/14/2020 06:09 PM  
 ALT (SGPT) 18 01/14/2020 06:09 PM  
 
 
 
ASSESSMENT and PLAN 
  ICD-10-CM ICD-9-CM 1. Cellulitis of right lower extremity L03.115 682.6 I83.891 454.0 L97.919    
 R60.9 2. Diabetic ulcer of right midfoot associated with type 2 diabetes mellitus, with fat layer exposed ([address] E11.621 250.80 L97.412 707.14   
3. Diabetic ulcer of left heel associated with type 2 diabetes mellitus, with necrosis of muscle (HCC) E11.621 250.80 L97.423 707.14   
4. Long term (current) use of anticoagulants Z79.01 V58.61 AMB POC PT/INR  
   PROTHROMBIN TIME + INR 5. PAF (paroxysmal atrial fibrillation) ([facility]) I48.0 427.31 AMB POC PT/INR  
   warfarin (COUMADIN) 4 mg tablet PROTHROMBIN TIME + INR  
6. Advance directive discussed with patient Z71.89 V65.49   
7. Severe obesity (BMI 35.0-39. 9) with comorbidity ([facility] E66.01 278.01 Diagnoses and all orders for this visit: 1. Cellulitis of right lower extremity Resolved. However she is high risk for recurrence. 3. Diabetic ulcer of right midfoot associated with type 2 diabetes mellitus, with fat layer exposed ([facility] Follow up in wound care clinic. 4. Diabetic ulcer of left heel associated with type 2 diabetes mellitus, with necrosis of muscle ([address] Follow up in wound care clinic 5. Long term (current) use of anticoagulants INR is too high Fluctuating warfarin requirement due to antibiotic therapy -     AMB POC PT/INR 
-     PROTHROMBIN TIME + INR; Future 6. PAF (paroxysmal atrial fibrillation) ([address] Stable,  
-     AMB POC PT/INR 
-     Change warfarin (COUMADIN) 4 mg tablet; Take 1 tablet on Mon and a half tablet the other 6 days. 
-     PROTHROMBIN TIME + INR; Future 7. Advance directive discussed with patient 8. Severe obesity (BMI 35.0-39. 9) with comorbidity ([address] Discussed using smaller plate for portion control, keeping a food diary for awareness of food consumed, checking weight often, and increasing physical activity. Will re-evaluate next visit. Follow-up and Dispositions · Return for Keep appointment in February  NON-fasting lab in 2 weeks, but not  on Fri. 
  
 
lab results and schedule of future lab studies reviewed with patient 
reviewed diet, exercise and weight control I have discussed the diagnosis, evaluation and treatment options and the intended plan with the patient. Patient understands and is in agreement. The patient has received an after-visit summary and questions were answered concerning future plans. I have discussed side effects and warnings of any new medications with the patient as well.

## 2020-01-28 ENCOUNTER — HOSPITAL ENCOUNTER (OUTPATIENT)
Dept: WOUND CARE | Age: 60
Discharge: HOME | End: 2020-01-28
Payer: COMMERCIAL

## 2020-01-28 VITALS
HEART RATE: 81 BPM | DIASTOLIC BLOOD PRESSURE: 77 MMHG | TEMPERATURE: 96.8 F | SYSTOLIC BLOOD PRESSURE: 168 MMHG | RESPIRATION RATE: 16 BRPM

## 2020-01-28 PROCEDURE — 97597 DBRDMT OPN WND 1ST 20 CM/<: CPT

## 2020-01-28 NOTE — WOUND CARE
01/28/20 1539 Wound Heel Left #1 12/17/19 Date First Assessed/Time First Assessed: 12/17/19 1529   Wound Approximate Age at First Assessment (Weeks): 5 weeks  Primary Wound Type: Diabetic Ulcer  Location: Heel  Wound Location Orientation: Left  Wound Description: #1  Date of First Observation. .. Dressing Type Applied Collagens/cell matrix;Gauze;Gauze wrap (arnie) (HFBR) Wound Foot Right;Plantar;Lateral #2 07/30/19 Date First Assessed/Time First Assessed: 07/30/19 1530   Wound Approximate Age at First Assessment (Weeks): 24 weeks  Primary Wound Type: Diabetic Ulcer  Location: Foot  Wound Location Orientation: Right;Plantar;Lateral  Wound Description: #2  Date of. .. Dressing Type Applied Collagens/cell matrix;Gauze;Gauze wrap (arnie) (HFBR) Discharge Condition: Stable Pain: 0 Ambulatory Status: Walking Discharge Destination: Home Transportation: Car Accompanied by: Self Discharge instructions reviewed with Patient and copy or written instructions have been provided. All questions/concerns have been addressed at this time.

## 2020-01-28 NOTE — PROGRESS NOTES
Patient presents to wound clinic for: Festus Acosta is a 60 y.o. female who presents for wound care of bilateral plantar foot ulcers. Patient currently being treated by Dr. Nica Stewart for a wound of the left breast, and was referred to me for further offloading and wound care recommendations of the bilateral heel ulcers. The ulcer of the plantar left heel occurred first and patient was placed in a heel offloading shoe. She then subsequently developed an ulceration of the plantar 5th met base of the right foot. She has been wearing her new diabetic shoes without any issues. Denies f/c/n/v/d. Notes that she has been staying off her feet since coming out of the hosptial.  Notes that she has a job interview later this week and can't go into a TCC Pertinent Medical History: 
Past Medical History:  
Diagnosis Date  Acquired lymphedema Right arm  Arrhythmia  Asthma  Atrial fibrillation ([facility] Dr. Bereket Bray and Dr. Sun Burton [facility])  Cancer ([facility] bilateral breast  
 Chronic kidney disease  Diabetes mellitus type II   
 Dizziness  Essential hypertension  Hyperlipidemia  Hypertension  Long term (current) use of anticoagulants  Morbid obesity ([facility] 3/14/2012  Osteoarthritis  Pacemaker  Sick sinus syndrome ([facility] Past Surgical History:  
Procedure Laterality Date  ABDOMEN SURGERY PROC UNLISTED    
 gastric bypass  GASTRIC BYPASS,OBESE<150CM SAW-EN-Y  7/08  
 [facility]  HX AFIB ABLATION    
 HX CARPAL TUNNEL RELEASE [address] RIGHT MODIFIED RADICAL   
 HX MOHS PROCEDURES  1995  
 right  HX ORTHOPAEDIC    
 ight knee surgery  HX PACEMAKER    
 IR INSERT TUNL CVC W PORT OVER 5 YEARS    
 LAMINECTOMY,CERVICAL  1994  
 NEEDLE BIOPSY LIVER,W OTHR [address]  8.21.07  
 [place] [address] AND 1994  [place] TRABECULOPLASTY BY LASER SURGERY    
 US GUIDE ASP OVARIAN CYST ABD APPROACH  8.21.07  
 RIGHT Prior to Admission medications Medication Sig Start Date End Date Taking? Authorizing Provider  
warfarin (COUMADIN) 4 mg tablet Take 1 tablet on Mon and a half tablet the other 6 days. 1/24/20  Yes Akiko Fournier MD  
doxycycline (MONODOX) 100 mg capsule Take 100 mg by mouth two (2) times a day. 1/21/20 2/4/20 Yes Provider, Historical  
acetaminophen 500 mg tab 500 mg, diphenhydrAMINE 25 mg cap 25 mg Take 2 Doses by mouth nightly. Yes Provider, Historical  
potassium chloride SR (KLOR-CON 10) 10 mEq tablet Take 10 mEq by mouth daily. Indications: Patient states she intends to take this medication untl she discusses with Dr. Wilda Rodriguez on 12-12-19. Yes Provider, Historical  
insulin glargine (LANTUS U-100 INSULIN) 100 unit/mL injection Inject 20 units daily 11/10/19  Yes Akiko Fournier MD  
hydrALAZINE (APRESOLINE) 100 mg tablet TAKE ONE TABLET BY MOUTH THREE TIMES A DAY 10/21/19  Yes Akiko Fournier MD  
sertraline (ZOLOFT) 50 mg tablet TAKE ONE AND ONE-HALF (1 & 1/2) TABLET BY MOUTH DAILY 8/22/19  Yes Akiko Fournier MD  
bumetanide (BUMEX) 1 mg tablet Take 2 mg by mouth daily. Yes Provider, Historical  
cloNIDine HCl (CATAPRES) 0.3 mg tablet Take 0.6 mg by mouth nightly. Yes Provider, Historical  
metoprolol succinate (TOPROL-XL) 100 mg tablet TAKE TWO TABLETS BY MOUTH DAILY 7/12/19  Yes Akiko Fournier MD  
doxazosin (CARDURA) 4 mg tablet TAKE ONE TABLET BY MOUTH ONCE NIGHTLY 6/14/19  Yes Akiko Fournier MD  
busPIRone (BUSPAR) 10 mg tablet TAKE ONE TABLET BY MOUTH TWICE A DAY 5/24/19  Yes Akiko Fournier MD  
metolazone (ZAROXOLYN) 5 mg tablet Take 1 Tab by mouth daily as needed (take if develop increased LE edema, weight gain > 5 pounds. ). 4/10/14  Yes Waynard Chanda, NP  
cholecalciferol, VITAMIN D3, (VITAMIN D3) 5,000 unit tab tablet Take 5,000 Units by mouth daily. Yes Provider, Historical  
vitamin A 8,000 unit capsule Take 8,000 Units by mouth daily. Yes Provider, Historical  
cyanocobalamin (VITAMIN B-12) 1,000 mcg sublingual tablet Take 1,000 mcg by mouth daily. Yes Provider, Historical  
cefdinir (OMNICEF) 300 mg capsule Take 1 Cap by mouth two (2) times a day for 7 days. 1/20/20 1/27/20  William Castro MD  
insulin lispro (HUMALOG) 100 unit/mL kwikpen 2 units with dinner for sugars over 200 1/3/14   Radha Espino MD  
 
Allergies Allergen Reactions  Ace Inhibitors Cough  Amlodipine Swelling  Avapro [Irbesartan] Unable to Obtain  Bactrim [Sulfamethoxazole-Trimethoprim] Other (comments) Sore mouth  Captopril Cough  Carvedilol Diarrhea [address]  Morphine Nausea and Vomiting and Swelling  Penicillins Hives  Pravachol [Pravastatin] Nausea Only  Seafood [Shellfish Containing Products] Hives  Singulair [Montelukast] Other (comments)  
  palpitations Family History Problem Relation Age of Onset  Cancer Mother  Heart Disease Mother  Alcohol abuse Father  Diabetes Brother  Hypertension Brother Social History Socioeconomic History  Marital status:  Spouse name: Not on file  Number of children: Not on file  Years of education: Not on file  Highest education level: Not on file Occupational History  Not on file Social Needs  Financial resource strain: Not on file  Food insecurity:  
  Worry: Not on file Inability: Not on file  Transportation needs:  
  Medical: Not on file Non-medical: Not on file Tobacco Use  Smoking status: Never Smoker  Smokeless tobacco: Never Used Substance and Sexual Activity  Alcohol use: Yes Comment: rare  Drug use: No  
 Sexual activity: Not on file Lifestyle  Physical activity:  
  Days per week: Not on file Minutes per session: Not on file  Stress: Not on file Relationships  Social connections:  
  Talks on phone: Not on file Gets together: Not on file Attends Nondenominational service: Not on file Active member of club or organization: Not on file Attends meetings of clubs or organizations: Not on file Relationship status: Not on file  Intimate partner violence:  
  Fear of current or ex partner: Not on file Emotionally abused: Not on file Physically abused: Not on file Forced sexual activity: Not on file Other Topics Concern  Not on file Social History Narrative  Not on file Vitals:  
 01/28/20 1446 BP: 168/77 Pulse: 81 Resp: 16 Temp: 96.8 °F (36 °C) Review of Systems: 
 
Gen: No fever, chills, malaise, weight loss/gain. Heent: No headache, rhinorrhea, epistaxis, ear pain, hearing loss, sinus pain, neck pain/stiffness, sore throat. Heart: No chest pain, palpitations, CHOW, pnd, or orthopnea. Resp: No cough, hemoptysis, wheezing and shortness of breath. GI: No nausea, vomiting, diarrhea, constipation, melena or hematochezia. : No urinary obstruction, dysuria or hematuria. Derm: see below Musc/skeletal: no bone or joint complains. Vasc: No edema, cyanosis or claudication. Endo: No heat/cold intolerance, no polyuria,polydipsia or polyphagia. Neuro: No unilateral weakness, numbness, tingling. No seizures. Heme: No easy bruising or bleeding. Wound Description: 
Refer to nursing notes for measurements. Ulcer Debridement Left plantar foot wound Character of Ulcer: Improved Indication for Debridement: Slough, Exudate, Abnormal wound edge and Abnormal Wound base Pre debridement measurements: 2.5 x 2.1 x 0.4 cm Risks of procedure were discussed with patient. Consent has been signed. Anesthetic: Lidocaine 4% topical cream  
 
Level of Debridement: Subctaneous Tissue , muscule Tissue and/or Material removed: Exudate, Fibrin/ Slough and Subcutaneous, Type of Tissue: Non- Viable Pre-debridement severity: Fat Layer Exposed Post debridement severity: Fat Layer exposed Instrument(s) used: Scalpel Bleeding controlled with: Pressure Estimated blood loss: Minimal 
 
Pre debridement measurements: 2.6 x 2.3 x 0.5 cm Post procedural pain: mild Patient tolerated procedure well. Right  plantar foot wound Character of Ulcer: smaller Indication for Debridement: Slough, Exudate, Abnormal wound edge and Abnormal Wound base Pre debridement measurements: 0.8 cm x 1.2 cm x 0.2 cm Risks of procedure were discussed with patient. Consent has been signed. Anesthetic: Lidocaine 4% topical cream  
 
Level of Debridement: Subctaneous Tissue Tissue and/or Material removed: Exudate, Fibrin/ Slough and Subcutaneous Type of Tissue: Non- Viable Pre-debridement severity: Fat Layer Exposed Post debridement severity: Fat Layer exposed Instrument(s) used: Scalpel Bleeding controlled with: Pressure Estimated blood loss: Minimal 
 
Postdebridement measurements: 0.9 cm x 1.3 cm x 0.3 cm Post procedural pain: mild Patient tolerated procedure well. Infection: none Edema: mild edema Lower Extremity Circulation Reviewed patient's recent ABIs. Shows decrease from when the  
 
Offloading: Offloading padding to shoes Assessment: 1. Diabetic plantar heel ulcer left foot with associated infection 2. Diabetic ulcer right foot-plantar 5th met base 3. Diabetic peripheral neuropathy 4. B/l cavus foot deformity 5. Right thigh and lower leg cellulitis with low grade fever Plan:  
-Patient seen and evaluated as noted above. 
-Wound debridement performed of both feet. -Fortunately both wounds are improving this week. Right heel is small. Left heel is developing a red granular base, with muscle no longer exposed. 
-Both feet were dressed with endoform, hydrofera blue, gauze dressing. Patient instructed to stay off feet as much as possible. Continue diabetic shoes. Change every 3 days. 
-Follow-up next week. Will consider reapplying TCC at that time.

## 2020-01-28 NOTE — DISCHARGE INSTRUCTIONS
Discharge Instructions for  [place] [id number] [address]  Telephone: [phone]    NAME:  Lalitha Hu OF BIRTH:  1960  MEDICAL RECORD NUMBER:  [id number]  DATE:  1/28/2020    Wound Cleansing:   Do not scrub or use excessive force. Cleanse wound prior to applying a clean dressing with:  [x] Normal Saline [] Keep Wound Dry in Shower    [] Wound Cleanser   [] Cleanse wound with Mild Soap & Water  [] May Shower at Discharge   [] Other:      Topical Treatments:  Do not apply lotions, creams, or ointments to wound bed unless directed. [] Apply moisturizing lotion to skin surrounding the wound prior to dressing change.  [] Apply antifungal ointment to skin surrounding the wound prior to dressing change.  [] Apply thin film of moisture barrier ointment to skin immediately around wound. [] Other:       Dressings:           Wound Location left heel and right foot wounds  [x] Apply Primary Dressing:       [] MediHoney Gel [] Alginate with Silver [] Alginate   [x] Collagen endoform,[] Collagen with Silver   [] Santyl with Moisten saline gauze     [] Hydrocolloid   [] MediHoney Alginate [] Foam with Silver   [] Foam   [x] Hydrofera Blue Ready     [] Mepilex Border    [] Moisten with Saline [] Hydrogel [] Mepitel  [] Betadine moist gauze   [] Bactroban/Mupirocin [] Polysporin  [] Other:    [] Pack wound loosely with  [] Iodoform   [] Plain Packing  [] Other   [] Cover and Secure with:     [x] Gauze [] Tejinder [] Kerlix   [] Ace Wrap [x] Cover Roll Tape [] ABD [] Exudry    [] Other:    Avoid contact of tape with skin.   [x] Change dressing: [x] Daily    [x] Every Other Day [] Three times per week   [] Once a week [] Do Not Change Dressing   [] Other:     Edema Control:  Apply: [] Compression Stocking []Right Leg []Left Leg   [] Tubigrip []Right Leg Double Layer []Left Leg Double Layer       []Right Leg Single Layer []Left Leg Single Layer   [] SpandaGrip []Right Leg  []Left Leg      []Low compression 5-10 mm/Hg      []Medium compression 10-20 mm/Hg     []High compression  20-30 mm/Hg   every morning immediately when getting up should be applied to affected leg(s) from mid foot to knee making sure to cover the heel. Remove every night before going to bed. [] Elevate leg(s) above the level of the heart when sitting. [] Avoid prolonged standing in one place. [] Elevate arm/hand above the level of the heart []RightArm []LeftArm    Off-Loading:   [] Off-loading when [] walking  [] in bed [] sitting  [] Total non-weight bearing  [] Right Leg  [] Left Leg   [] Assistive Device [] Walker [] Cane  [] Wheelchair  [] Crutches   [] Surgical shoe    [] Podus Boot(s)   [] Foam Boot(s)  [] Roll About    [] Cast Boot [] CROW Boot  [] Other:    Contact Cast:  Apply: [] Total Contact Cast Applied in Clinic []RightLeg []Left Leg   [] Do not get cast wet. Contact center or go to emergency room if there is a foul odor or becomes uncomfortable due to feeling tight or swelling. Do not use objects inside of cast to scratch.       Dietary:  [x] Diet as tolerated: [x] Diabetic Diet: Low carb no sugar [] No Added Salt:  [] Increase Protein: [] Other:   Activity:  [x] Activity as tolerated:  [] Patient has no activity restrictions     [] Strict Bedrest: [] Remain off Work:     [] May return to full duty work:                                   [] Return to work with restrictions:             Return Appointment:  [] Wound and dressing supply provider:   [] ECF or Home Healthcare:  [] Wound Assessment: [] Physician or NP scheduled for Wound Assessment:   [x] Return Appointment: With Dr Sergo Lechuga in [address] [place] [address])  [] Ordered tests:      Electronically signed Roxanne Sandhu RN on 1/28/2020 at 3:32 PM     [facility] Information: Should you experience any significant changes in your wound(s) or have questions about your wound care, please contact the [facility] at [address] [place] [address] 8:00 am - 4:30. If you need help with your wound outside these hours and cannot wait until we are again available, contact your PCP or go to the hospital emergency room. PLEASE NOTE: IF YOU ARE UNABLE TO OBTAIN WOUND SUPPLIES, CONTINUE TO USE THE SUPPLIES YOU HAVE AVAILABLE UNTIL YOU ARE ABLE TO REACH US. IT IS MOST IMPORTANT TO KEEP THE WOUND COVERED AT ALL TIMES.      Physician Signature:_______________________    Date: ___________ Time:  ____________

## 2020-01-28 NOTE — WOUND CARE
01/28/20 1510 Wound Heel Left #1 No Date First Assessed or Time First Assessed found. Wound Approximate Age at First Assessment (Weeks): 24 weeks  Primary Wound Type: Diabetic Ulcer  Location: Heel  Wound Location Orientation: Left  Wound Description: #1 Dressing Status Removed Dressing Type Gauze;Gauze wrap (arnie); Special tape (comment) Wound Length (cm) 2.1 cm Wound Width (cm) 2.5 cm Wound Depth (cm) 0.4 cm Wound Surface Area (cm^2) 5.25 cm^2 Wound Volume (cm^3) 2.1 cm^3 Condition of Base Pink Condition of Edges Open Undermining (cm) 0.4 cm 
(at 5 o'clock) Direction of Undermining 11 o'clock 
(to 5 o'clock) Drainage Amount Small Drainage Color Serous Wound Odor None Sandra-wound Assessment Intact (calloused) Cleansing and Cleansing Agents  Normal saline Wound Foot Right;Plantar;Lateral #2 No Date First Assessed or Time First Assessed found. Wound Approximate Age at First Assessment (Weeks): 24 weeks  Primary Wound Type: Diabetic Ulcer  Location: Foot  Wound Location Orientation: Right;Plantar;Lateral  Wound Description: #2 Dressing Status Removed Dressing Type Gauze;Gauze wrap (arnie); Special tape (comment) Wound Length (cm) 0.8 cm Wound Width (cm) 1.2 cm Wound Depth (cm) 0.2 cm Wound Surface Area (cm^2) 0.96 cm^2 Wound Volume (cm^3) 0.19 cm^3 Condition of Base Pink Condition of Edges Open;Calloused Drainage Amount Small Drainage Color Serous Wound Odor None Sandra-wound Assessment Intact Cleansing and Cleansing Agents  Normal saline Visit Vitals /77 (BP 1 Location: Left arm, BP Patient Position: Sitting; At rest) Pulse 81 Temp 96.8 °F (36 °C) Resp 16 LLE Peripheral Vascular Capillary Refill: Less than/equal to 3 seconds (01/28/20 1458) Color: Appropriate for race(brownish discoloration) (01/28/20 1458) Temperature: Warm (01/28/20 1458) Sensation: Decreased (01/28/20 1458) Pedal Pulse: Palpable (01/28/20 1458) Circumference of Calf (cm): 44.5 cm (01/28/20 1458) Location of Measurement (Calf): Mid  (01/28/20 1458) Circumference of Ankle (cm): 23.5 cm (01/28/20 1458) Location of Measurement (Ankle): Upper  (01/28/20 1458) RLE Peripheral Vascular Capillary Refill: Less than/equal to 3 seconds (01/28/20 1458) Color: Appropriate for race(brownish skin discoloration) (01/28/20 1458) Temperature: Warm (01/28/20 1458) Sensation: Decreased (01/28/20 1458) Pedal Pulse: Palpable (01/28/20 1458) Circumference of Calf (cm): 42 cm (01/28/20 1458) Location of Measurement (Calf): Mid  (01/28/20 1458) Circumference of Ankle (cm): 23.5 cm (01/28/20 1458) Location of Measurement (Ankle): Upper  (01/28/20 1458)

## 2020-01-31 ENCOUNTER — TELEPHONE (OUTPATIENT)
Dept: INTERNAL MEDICINE CLINIC | Facility: CLINIC | Age: 60
End: 2020-01-31

## 2020-01-31 NOTE — TELEPHONE ENCOUNTER
Pt called she stated that she had Dr. Angela Cevallos fill out a dmv form for her at her last appt but she can not find it. Pt is asking for another form and she will come pick it up.  She asked for the nurse to call her once ready

## 2020-02-04 ENCOUNTER — PATIENT OUTREACH (OUTPATIENT)
Dept: INTERNAL MEDICINE CLINIC | Facility: CLINIC | Age: 60
End: 2020-02-04

## 2020-02-04 ENCOUNTER — HOSPITAL ENCOUNTER (OUTPATIENT)
Dept: WOUND CARE | Age: 60
Discharge: HOME | End: 2020-02-04
Payer: COMMERCIAL

## 2020-02-04 VITALS
DIASTOLIC BLOOD PRESSURE: 65 MMHG | RESPIRATION RATE: 16 BRPM | SYSTOLIC BLOOD PRESSURE: 146 MMHG | HEART RATE: 66 BPM | TEMPERATURE: 96.5 F

## 2020-02-04 DIAGNOSIS — L03.115 CELLULITIS OF RIGHT LOWER EXTREMITY: ICD-10-CM

## 2020-02-04 PROCEDURE — 97597 DBRDMT OPN WND 1ST 20 CM/<: CPT

## 2020-02-04 PROCEDURE — 29445 APPL RIGID TOT CNTC LEG CAST: CPT

## 2020-02-04 NOTE — DISCHARGE INSTRUCTIONS
Discharge Instructions for  [place] [id number] [address]  Telephone: [phone]    NAME:  Chrystal Flood  YOB: 1960  MEDICAL RECORD NUMBER:  [id number]  DATE:  2/4/2020    Wound Cleansing:   Do not scrub or use excessive force. Cleanse wound prior to applying a clean dressing with:  [] Normal Saline [] Keep Wound Dry in Shower    [] Wound Cleanser   [] Cleanse wound with Mild Soap & Water  [] May Shower at Discharge   [] Other:      Topical Treatments:  Do not apply lotions, creams, or ointments to wound bed unless directed. [] Apply moisturizing lotion to skin surrounding the wound prior to dressing change.  [] Apply antifungal ointment to skin surrounding the wound prior to dressing change.  [] Apply thin film of moisture barrier ointment to skin immediately around wound. [] Other:       Dressings:           Wound Location. .. left foot         [x] Apply Primary Dressing:       [] MediHoney Gel [] Alginate with Silver [] Alginate   [x] Collagen. . endoform [] Collagen with Silver   [] Santyl with Moisten saline gauze     [] Hydrocolloid   [] MediHoney Alginate [] Foam with Silver   [] Foam   [x] Hydrofera Blue Ready   [] Mepilex Border    [] Moisten with Saline [] Hydrogel [] Mepitel  [] Betadine moist gauze   [] Bactroban/Mupirocin [] Polysporin  [] Other:    [] Pack wound loosely with  [] Iodoform   [] Plain Packing  [] Other   [x] Cover and Secure with:     [x] Gauze then TCC [] Azalee Castalia [] Kerlix   [] Ace Wrap [] Cover Roll Tape [] ABD [] Exudry    [] Other:    Avoid contact of tape with skin.   [] Change dressing: [] Daily    [] Every Other Day [] Three times per week   [] Once a week [x] Do Not Change Dressing   [] Other:   Wound Location right foot wound  [x] Apply Primary Dressing:       [] MediHoney Gel [] Alginate with Silver [] Alginate   [x] Collagen endoform,  [] Collagen with Silver   [] Santyl with Moisten saline gauze     [] Hydrocolloid   [] MediHoney Alginate [] Foam with Silver   [] Foam   [x] Hydrofera Blue Ready   [] Mepilex Border    [] Moisten with Saline [] Hydrogel [] Mepitel  [] Betadine moist gauze   [] Bactroban/Mupirocin [] Polysporin  [] Other:    [] Pack wound loosely with  [] Iodoform   [] Plain Packing  [] Other   [] Cover and Secure with:     [x] Gauze [] Tejinder [] Kerlix   [] Ace Wrap [x] Cover Roll Tape [] ABD [] Exudry    [] Other:    Avoid contact of tape with skin. [] Change dressing: [] Daily    [] Every Other Day [] Three times per week   [] Once a week [] Do Not Change Dressing   [x] Other: every 3 days   Edema Control:  Apply: [] Compression Stocking []Right Leg []Left Leg   [] Tubigrip []Right Leg Double Layer []Left Leg Double Layer       []Right Leg Single Layer []Left Leg Single Layer   [] SpandaGrip []Right Leg  []Left Leg      []Low compression 5-10 mm/Hg      []Medium compression 10-20 mm/Hg     []High compression  20-30 mm/Hg   every morning immediately when getting up should be applied to affected leg(s) from mid foot to knee making sure to cover the heel. Remove every night before going to bed. [] Elevate leg(s) above the level of the heart when sitting. [] Avoid prolonged standing in one place. [] Elevate arm/hand above the level of the heart []RightArm []LeftArm    Off-Loading:   [] Off-loading when [] walking  [] in bed [] sitting  [] Total non-weight bearing  [] Right Leg  [] Left Leg   [] Assistive Device [] Walker [] Cane  [] Wheelchair  [] Crutches   [] Surgical shoe    [] Podus Boot(s)   [] Foam Boot(s)  [] Roll About    [] Cast Boot [] CROW Boot  [] Other:    Contact Cast:  Apply: [x] Total Contact Cast Applied in Clinic []RightLeg [x]Left Leg   [x] Do not get cast wet. Contact center or go to emergency room if there is a foul odor or becomes uncomfortable due to feeling tight or swelling. Do not use objects inside of cast to scratch.       Dietary:  [] Diet as tolerated: [] Diabetic Diet: Low carb no sugar [] No Added Salt:  [] Increase Protein: [] Other:   Activity:  [] Activity as tolerated:  [] Patient has no activity restrictions     [] Strict Bedrest: [] Remain off Work:     [] May return to full duty work:                                   [] Return to work with restrictions:             Return Appointment:  [] Wound and dressing supply provider:   [] ECF or Home Healthcare:  [] Wound Assessment: [] Physician or NP scheduled for Wound Assessment:   [x] Return Appointment: With MD  in  1 Week(s)  [] Ordered tests:      Electronically signed Akshat Michelle RN on 2/4/2020 at 3:38 PM     [facility]: Should you experience any significant changes in your wound(s) or have questions about your wound care, please contact the [facility] at [address] [place] [address] 8:00 am - 4:30. If you need help with your wound outside these hours and cannot wait until we are again available, contact your PCP or go to the hospital emergency room. PLEASE NOTE: IF YOU ARE UNABLE TO OBTAIN WOUND SUPPLIES, CONTINUE TO USE THE SUPPLIES YOU HAVE AVAILABLE UNTIL YOU ARE ABLE TO REACH US. IT IS MOST IMPORTANT TO KEEP THE WOUND COVERED AT ALL TIMES.      Physician Signature:_______________________    Date: ___________ Time:  ____________

## 2020-02-04 NOTE — WOUND CARE
02/04/20 1443 Wound Heel Left #1 12/17/19 Date First Assessed/Time First Assessed: 12/17/19 1529   Wound Approximate Age at First Assessment (Weeks): 5 weeks  Primary Wound Type: Diabetic Ulcer  Location: Heel  Wound Location Orientation: Left  Wound Description: #1  Date of First Observation. .. Dressing Status Removed Dressing Type Gauze;Gauze wrap (arnie) Wound Length (cm) 2.4 cm Wound Width (cm) 2 cm Wound Depth (cm) 0.4 cm Wound Surface Area (cm^2) 4.8 cm^2 Wound Volume (cm^3) 1.92 cm^3 Condition of Base Pink Condition of Edges Open Undermining (cm) 0.3 cm 
(at 11 o clock) Direction of Undermining 7 o'clock 
(to 11 o clock) Drainage Amount Small Drainage Color Serous Wound Odor None Sandra-wound Assessment Intact Cleansing and Cleansing Agents  Normal saline Wound Foot Right;Plantar;Lateral #2 07/30/19 Date First Assessed/Time First Assessed: 07/30/19 1530   Wound Approximate Age at First Assessment (Weeks): 24 weeks  Primary Wound Type: Diabetic Ulcer  Location: Foot  Wound Location Orientation: Right;Plantar;Lateral  Wound Description: #2  Date of. .. Dressing Status Removed Dressing Type Gauze;Gauze wrap (arnie) Wound Length (cm) 1 cm Wound Width (cm) 1 cm Wound Depth (cm) 0.2 cm Wound Surface Area (cm^2) 1 cm^2 Wound Volume (cm^3) 0.2 cm^3 Condition of Base Pink Condition of Edges Open;Calloused Drainage Amount Small Drainage Color Serous Wound Odor None Sandra-wound Assessment Intact Cleansing and Cleansing Agents  Normal saline Visit Vitals /65 (BP 1 Location: Left arm, BP Patient Position: Sitting; At rest) Pulse 66 Temp 96.5 °F (35.8 °C) Resp 16 LLE Peripheral Vascular Capillary Refill: Less than/equal to 3 seconds (02/04/20 1449) Color: Appropriate for race (02/04/20 1449) Temperature: Warm (02/04/20 1449) Sensation: Decreased (02/04/20 1449) Pedal Pulse: Palpable (02/04/20 1449) Circumference of Calf (cm): 44 cm (02/04/20 1449) Location of Measurement (Calf): Mid  (02/04/20 1449) Circumference of Ankle (cm): 24.5 cm (02/04/20 1449) Location of Measurement (Ankle): Upper  (02/04/20 1449) RLE Peripheral Vascular Capillary Refill: Less than/equal to 3 seconds (02/04/20 1449) Color: Appropriate for race (02/04/20 1449) Temperature: Warm (02/04/20 1449) Sensation: Decreased (02/04/20 1449) Pedal Pulse: Palpable (02/04/20 1449) Circumference of Calf (cm): 44.5 cm (02/04/20 1449) Location of Measurement (Calf): Mid  (02/04/20 1449) Circumference of Ankle (cm): 24 cm (02/04/20 1449) Location of Measurement (Ankle): Upper  (02/04/20 1449)

## 2020-02-04 NOTE — PROGRESS NOTES
2-4-20 at 11:50am: Care Transitions Nurse attempted to reach patient, via phone, for transitions of care follow-up phone call. CTN left message for patient at home phone number and mobile phone number listed in chart for her. Phone number listed for Janeen Martínez (on [address] [place] [address]) is the same phone number as patient's home phone number. 2-5-20 at 9:33am: CTN left messages for patient at home phone number and mobile phone number listed in chart. 2-6-20 at 11:46am: CTN left messages for patient at home phone number and mobile phone number listed in chart. 2-7-20 at 9:41am: CTN has not received a response from patient in regards to previous messages for left for her requesting call back. CTN will continue to follow.

## 2020-02-04 NOTE — PROGRESS NOTES
Patient presents to wound clinic for: Lina Rios is a 60 y.o. female who presents for wound care of bilateral plantar foot ulcers. Patient currently being treated by Dr. Thomas Vidal for a wound of the left breast, and was referred to me for further offloading and wound care recommendations of the bilateral heel ulcers. The ulcer of the plantar left heel occurred first and patient was placed in a heel offloading shoe. She then subsequently developed an ulceration of the plantar 5th met base of the right foot. She has been wearing her new diabetic shoes without any issues. Denies f/c/n/v/d. Notes that she has been staying off her feet. Presents today for total contact cast. 
 
Pertinent Medical History: 
Past Medical History:  
Diagnosis Date  Acquired lymphedema Right arm  Arrhythmia  Asthma  Atrial fibrillation ([facility] [address] Dr. Kathleen Fernandez and Dr. Vamshi Hwang [facility])  Cancer ([address] bilateral breast  
 Chronic kidney disease  Diabetes mellitus type II   
 Dizziness  Essential hypertension  Hyperlipidemia  Hypertension  Long term (current) use of anticoagulants  Morbid obesity ([address] 3/14/2012  Osteoarthritis  Pacemaker  Sick sinus syndrome ([address] Past Surgical History:  
Procedure Laterality Date  ABDOMEN SURGERY PROC UNLISTED    
 gastric bypass  GASTRIC BYPASS,OBESE<150CM [facility]  [facility]  
 [facility]  HX AFIB ABLATION    
 HX CARPAL TUNNEL RELEASE [address] [facility] [address] RIGHT MODIFIED RADICAL   
 HX MOHS PROCEDURES  1995  
 right  HX ORTHOPAEDIC    
 ight knee surgery  HX PACEMAKER    
 IR INSERT TUNL CVC W PORT OVER 5 YEARS    
 LAMINECTOMY,CERVICAL  1994  
 NEEDLE BIOPSY LIVER,W OTHR [address]  8.21.07  
 [place] [address] AND 1994  [place] TRABECULOPLASTY BY LASER SURGERY    
 US GUIDE ASP OVARIAN CYST ABD APPROACH  8.21.07  
 RIGHT Prior to Admission medications Medication Sig Start Date End Date Taking? Authorizing Provider  
warfarin (COUMADIN) 4 mg tablet Take 1 tablet on Mon and a half tablet the other 6 days. 1/24/20  Yes Griselda Hall MD  
doxycycline (MONODOX) 100 mg capsule Take 100 mg by mouth two (2) times a day. 1/21/20 2/4/20 Yes Provider, Historical  
acetaminophen 500 mg tab 500 mg, diphenhydrAMINE 25 mg cap 25 mg Take 2 Doses by mouth nightly. Yes Provider, Historical  
potassium chloride SR (KLOR-CON 10) 10 mEq tablet Take 10 mEq by mouth daily. Indications: Patient states she intends to take this medication untl she discusses with Dr. Loraine Beasley on 12-12-19. Yes Provider, Historical  
insulin glargine (LANTUS U-100 INSULIN) 100 unit/mL injection Inject 20 units daily 11/10/19  Yes Griselda Hall MD  
hydrALAZINE (APRESOLINE) 100 mg tablet TAKE ONE TABLET BY MOUTH THREE TIMES A DAY 10/21/19  Yes Griselda Hall MD  
sertraline (ZOLOFT) 50 mg tablet TAKE ONE AND ONE-HALF (1 & 1/2) TABLET BY MOUTH DAILY 8/22/19  Yes Griselda Hall MD  
bumetanide (BUMEX) 1 mg tablet Take 2 mg by mouth daily. Yes Provider, Historical  
cloNIDine HCl (CATAPRES) 0.3 mg tablet Take 0.6 mg by mouth nightly. Yes Provider, Historical  
metoprolol succinate (TOPROL-XL) 100 mg tablet TAKE TWO TABLETS BY MOUTH DAILY 7/12/19  Yes Griselda Hall MD  
doxazosin (CARDURA) 4 mg tablet TAKE ONE TABLET BY MOUTH ONCE NIGHTLY 6/14/19  Yes Griselda Hall MD  
busPIRone (BUSPAR) 10 mg tablet TAKE ONE TABLET BY MOUTH TWICE A DAY 5/24/19  Yes Griselda Hall MD  
cholecalciferol, VITAMIN D3, (VITAMIN D3) 5,000 unit tab tablet Take 5,000 Units by mouth daily. Yes Provider, Historical  
vitamin A 8,000 unit capsule Take 8,000 Units by mouth daily. Yes Provider, Historical  
cyanocobalamin (VITAMIN B-12) 1,000 mcg sublingual tablet Take 1,000 mcg by mouth daily.    Yes Provider, Historical  
 metolazone (ZAROXOLYN) 5 mg tablet Take 1 Tab by mouth daily as needed (take if develop increased LE edema, weight gain > 5 pounds. ). 4/10/14   Elberta Kehr, NP  
insulin lispro (HUMALOG) 100 unit/mL kwikpen 2 units with dinner for sugars over 200 1/3/14   Carmela Ashton MD  
 
Allergies Allergen Reactions  Ace Inhibitors Cough  Amlodipine Swelling  Avapro [Irbesartan] Unable to Obtain  Bactrim [Sulfamethoxazole-Trimethoprim] Other (comments) Sore mouth  Captopril Cough  Carvedilol Diarrhea [address]  Morphine Nausea and Vomiting and Swelling  Penicillins Hives  Pravachol [Pravastatin] Nausea Only  Seafood [Shellfish Containing Products] Hives  Singulair [Montelukast] Other (comments)  
  palpitations Family History Problem Relation Age of Onset  Cancer Mother  Heart Disease Mother  Alcohol abuse Father  Diabetes Brother  Hypertension Brother Social History Socioeconomic History  Marital status:  Spouse name: Not on file  Number of children: Not on file  Years of education: Not on file  Highest education level: Not on file Occupational History  Not on file Social Needs  Financial resource strain: Not on file  Food insecurity:  
  Worry: Not on file Inability: Not on file  Transportation needs:  
  Medical: Not on file Non-medical: Not on file Tobacco Use  Smoking status: Never Smoker  Smokeless tobacco: Never Used Substance and Sexual Activity  Alcohol use: Yes Comment: rare  Drug use: No  
 Sexual activity: Not on file Lifestyle  Physical activity:  
  Days per week: Not on file Minutes per session: Not on file  Stress: Not on file Relationships  Social connections:  
  Talks on phone: Not on file Gets together: Not on file Attends Islam service: Not on file Active member of club or organization: Not on file Attends meetings of clubs or organizations: Not on file Relationship status: Not on file  Intimate partner violence:  
  Fear of current or ex partner: Not on file Emotionally abused: Not on file Physically abused: Not on file Forced sexual activity: Not on file Other Topics Concern  Not on file Social History Narrative  Not on file Vitals:  
 02/04/20 1447 BP: 146/65 Pulse: 66 Resp: 16 Temp: 96.5 °F (35.8 °C) Review of Systems: 
 
Gen: No fever, chills, malaise, weight loss/gain. Heent: No headache, rhinorrhea, epistaxis, ear pain, hearing loss, sinus pain, neck pain/stiffness, sore throat. Heart: No chest pain, palpitations, CHOW, pnd, or orthopnea. Resp: No cough, hemoptysis, wheezing and shortness of breath. GI: No nausea, vomiting, diarrhea, constipation, melena or hematochezia. : No urinary obstruction, dysuria or hematuria. Derm: see below Musc/skeletal: no bone or joint complains. Vasc: No edema, cyanosis or claudication. Endo: No heat/cold intolerance, no polyuria,polydipsia or polyphagia. Neuro: No unilateral weakness, numbness, tingling. No seizures. Heme: No easy bruising or bleeding. Wound Description: 
Refer to nursing notes for measurements. Ulcer Debridement Left plantar foot wound Character of Ulcer: Improved Indication for Debridement: Slough, Exudate, Abnormal wound edge and Abnormal Wound base Pre debridement measurements: 2.4 x 2 x 0.4 cm Risks of procedure were discussed with patient. Consent has been signed. Anesthetic: Lidocaine 4% topical cream  
 
Level of Debridement: Subctaneous Tissue , muscule Tissue and/or Material removed: Exudate, Fibrin/ Slough and Subcutaneous, Type of Tissue: Non- Viable Pre-debridement severity: Fat Layer Exposed Post debridement severity: Fat Layer exposed Instrument(s) used: Scalpel Bleeding controlled with: Pressure Estimated blood loss: Minimal 
 
Pre debridement measurements: 2.5 x 2.1 x 0.5 cm Post procedural pain: mild Patient tolerated procedure well. Right  plantar foot wound Character of Ulcer: smaller Indication for Debridement: Slough, Exudate, Abnormal wound edge and Abnormal Wound base Pre debridement measurements: 1 cm x 1 cm x 0.2 cm Risks of procedure were discussed with patient. Consent has been signed. Anesthetic: Lidocaine 4% topical cream  
 
Level of Debridement: Subctaneous Tissue Tissue and/or Material removed: Exudate, Fibrin/ Slough and Subcutaneous Type of Tissue: Non- Viable Pre-debridement severity: Fat Layer Exposed Post debridement severity: Fat Layer exposed Instrument(s) used: Scalpel Bleeding controlled with: Pressure Estimated blood loss: Minimal 
 
Postdebridement measurements: 1.1 cm x 1.1 cm x 0.3 cm Post procedural pain: mild Patient tolerated procedure well. Infection: none Edema: mild edema Lower Extremity Circulation Reviewed patient's recent ABIs. Shows decrease from when the  
 
Offloading: Offloading padding to shoes Assessment: 1. Diabetic plantar heel ulcer left foot with associated infection 2. Diabetic ulcer right foot-plantar 5th met base 3. Diabetic peripheral neuropathy 4. B/l cavus foot deformity 5. Right thigh and lower leg cellulitis with low grade fever Plan:  
-Patient seen and evaluated as noted above. 
-Wound debridement performed of both feet. -Fortunately both wounds are improving this week. Right heel is small. Left heel is developing a red granular base, with muscle no longer exposed. -Right foot was dressed with endoform, hydrofera blue, gauze dressing. Change every 3 days. 
-Endoform, hydrofera blue, TCC to LLE. -Follow-up in 1 week

## 2020-02-04 NOTE — WOUND CARE
02/04/20 1443 Wound Heel Left #1 12/17/19 Date First Assessed/Time First Assessed: 12/17/19 1529   Wound Approximate Age at First Assessment (Weeks): 5 weeks  Primary Wound Type: Diabetic Ulcer  Location: Heel  Wound Location Orientation: Left  Wound Description: #1  Date of First Observation. .. Dressing Status Removed Dressing Type Gauze;Gauze wrap (arnie) Wound Length (cm) 2.4 cm Wound Width (cm) 2 cm Wound Depth (cm) 0.4 cm Wound Surface Area (cm^2) 4.8 cm^2 Wound Volume (cm^3) 1.92 cm^3 Condition of Base Pink Condition of Edges Open Undermining (cm) 0.3 cm 
(at 11 o clock) Direction of Undermining 7 o'clock 
(to 11 o clock) Drainage Amount Small Drainage Color Serous Wound Odor None Sandra-wound Assessment Intact Cleansing and Cleansing Agents  Normal saline Dressing Changed Changed/New Dressing Type Applied  
(Endoform;HFBR; TCC) Wound Foot Right;Plantar;Lateral #2 07/30/19 Date First Assessed/Time First Assessed: 07/30/19 1530   Wound Approximate Age at First Assessment (Weeks): 24 weeks  Primary Wound Type: Diabetic Ulcer  Location: Foot  Wound Location Orientation: Right;Plantar;Lateral  Wound Description: #2  Date of. .. Dressing Status Removed Dressing Type Gauze;Gauze wrap (arnie) Wound Length (cm) 1 cm Wound Width (cm) 1 cm Wound Depth (cm) 0.2 cm Wound Surface Area (cm^2) 1 cm^2 Wound Volume (cm^3) 0.2 cm^3 Condition of Base Pink Condition of Edges Open;Calloused Drainage Amount Small Drainage Color Serous Wound Odor None Sandra-wound Assessment Intact Cleansing and Cleansing Agents  Normal saline Dressing Changed Changed/New Dressing Type Applied Gauze;Gauze wrap (arnie); Special tape (comment) 
(Endoform; HFBR) Discharge Condition: Stable Pain: 0 Ambulatory Status: Walking Discharge Destination: Home Transportation: Car Accompanied by: Family/Caregiver Discharge instructions reviewed with Patient and copy or written instructions have been provided. All questions/concerns have been addressed at this time.

## 2020-02-07 DIAGNOSIS — Z79.01 LONG TERM (CURRENT) USE OF ANTICOAGULANTS: ICD-10-CM

## 2020-02-07 DIAGNOSIS — I48.0 PAF (PAROXYSMAL ATRIAL FIBRILLATION) (HCC): ICD-10-CM

## 2020-02-11 ENCOUNTER — HOSPITAL ENCOUNTER (OUTPATIENT)
Dept: WOUND CARE | Age: 60
Discharge: HOME | End: 2020-02-11
Payer: COMMERCIAL

## 2020-02-11 VITALS
SYSTOLIC BLOOD PRESSURE: 145 MMHG | HEART RATE: 77 BPM | TEMPERATURE: 97.1 F | DIASTOLIC BLOOD PRESSURE: 65 MMHG | RESPIRATION RATE: 16 BRPM

## 2020-02-11 DIAGNOSIS — I48.0 PAF (PAROXYSMAL ATRIAL FIBRILLATION) (HCC): ICD-10-CM

## 2020-02-11 LAB
INR PPP: 1.5 (ref 0.8–1.2)
PROTHROMBIN TIME: 14.8 SEC (ref 9.1–12)

## 2020-02-11 PROCEDURE — 11042 DBRDMT SUBQ TIS 1ST 20SQCM/<: CPT

## 2020-02-11 RX ORDER — WARFARIN 4 MG/1
TABLET ORAL
Qty: 30 TAB | Refills: 5
Start: 2020-02-11 | End: 2020-03-08 | Stop reason: DRUGHIGH

## 2020-02-11 NOTE — PROGRESS NOTES
Inform patient INR is too low. Confirm current dose of warfarin 4 mg tablets 1 tablet on Mon and a half tablet the other 6 days. . Change warfarin 4 mg tablets to one and half tablets today, then 1 tablet on Sun and Wed and a half tablet the other 5 days. Repeat INR in  2 weeks. Patient informed in My Chart.

## 2020-02-11 NOTE — PROGRESS NOTES
Patient presents to wound clinic for: Ángel Quinones is a 60 y.o. female who presents for wound care of bilateral plantar foot ulcers. Patient currently being treated by Dr. Steven Smith for a wound of the left breast, and was referred to me for further offloading and wound care recommendations of the bilateral heel ulcers. The ulcer of the plantar left heel occurred first and patient was placed in a heel offloading shoe. She then subsequently developed an ulceration of the plantar 5th met base of the right foot. She has been wearing her new diabetic shoes without any issues to the right foot. Denies f/c/n/v/d. Notes that she has been staying off her feet. Presents today noting the the TCC has been causing painful rubbing of the bottom of the left foot. Pertinent Medical History: 
Past Medical History:  
Diagnosis Date  Acquired lymphedema Right arm  Arrhythmia  Asthma  Atrial fibrillation ([facility] [address] Dr. Shayan Watts and Dr. Rashida Pennington [facility])  Cancer ([facility] [address] bilateral breast  
 Chronic kidney disease  Diabetes mellitus type II   
 Dizziness  Essential hypertension  Hyperlipidemia  Hypertension  Long term (current) use of anticoagulants  Morbid obesity ([address] 3/14/2012  Osteoarthritis  Pacemaker  Sick sinus syndrome ([facility] [address] Past Surgical History:  
Procedure Laterality Date  ABDOMEN SURGERY PROC UNLISTED    
 gastric bypass  GASTRIC BYPASS,OBESE<150CM [facility]EN[facility]Y  7/08  
 [facility]  HX AFIB ABLATION    
 HX CARPAL TUNNEL RELEASE [address] [place] [id number] [address] RIGHT MODIFIED RADICAL   
 HX MOHS PROCEDURES  1995  
 right  HX ORTHOPAEDIC    
 ight knee surgery  HX PACEMAKER    
 IR INSERT TUNL CVC W PORT OVER 5 YEARS    
 LAMINECTOMY,CERVICAL  1994  
 NEEDLE BIOPSY LIVER,W OTHR [address]  8.21.07  
 [place] [id number] AND 1994  [place] TRABECULOPLASTY BY LASER SURGERY    
 US GUIDE ASP OVARIAN CYST ABD APPROACH  8.21.07  
 RIGHT Prior to Admission medications Medication Sig Start Date End Date Taking? Authorizing Provider  
warfarin (COUMADIN) 4 mg tablet Take 1 tablet on Mon and a half tablet the other 6 days. 1/24/20  Yes Floridalma Snyder MD  
acetaminophen 500 mg tab 500 mg, diphenhydrAMINE 25 mg cap 25 mg Take 2 Doses by mouth nightly. Yes Provider, Historical  
potassium chloride SR (KLOR-CON 10) 10 mEq tablet Take 10 mEq by mouth daily. Indications: Patient states she intends to take this medication untl she discusses with Dr. Eulalio Dance on 12-12-19. Yes Provider, Historical  
insulin glargine (LANTUS U-100 INSULIN) 100 unit/mL injection Inject 20 units daily 11/10/19  Yes Floridalma Snyder MD  
hydrALAZINE (APRESOLINE) 100 mg tablet TAKE ONE TABLET BY MOUTH THREE TIMES A DAY 10/21/19  Yes Floridalma Snyder MD  
sertraline (ZOLOFT) 50 mg tablet TAKE ONE AND ONE-HALF (1 & 1/2) TABLET BY MOUTH DAILY 8/22/19  Yes Floridalma Snyder MD  
bumetanide (BUMEX) 1 mg tablet Take 2 mg by mouth daily. Yes Provider, Historical  
cloNIDine HCl (CATAPRES) 0.3 mg tablet Take 0.6 mg by mouth nightly. Yes Provider, Historical  
metoprolol succinate (TOPROL-XL) 100 mg tablet TAKE TWO TABLETS BY MOUTH DAILY 7/12/19  Yes Floridalma Snyder MD  
doxazosin (CARDURA) 4 mg tablet TAKE ONE TABLET BY MOUTH ONCE NIGHTLY 6/14/19  Yes Floridalma Snyder MD  
busPIRone (BUSPAR) 10 mg tablet TAKE ONE TABLET BY MOUTH TWICE A DAY 5/24/19  Yes Floridalma Snyder MD  
metolazone (ZAROXOLYN) 5 mg tablet Take 1 Tab by mouth daily as needed (take if develop increased LE edema, weight gain > 5 pounds. ). 4/10/14  Yes Alejandro Contreras NP  
cholecalciferol, VITAMIN D3, (VITAMIN D3) 5,000 unit tab tablet Take 5,000 Units by mouth daily. Yes Provider, Historical  
vitamin A 8,000 unit capsule Take 8,000 Units by mouth daily.    Yes Provider, Historical  
insulin lispro (HUMALOG) 100 unit/mL kwikpen 2 units with dinner for sugars over 200 1/3/14  Yes Marlo Aquino MD  
cyanocobalamin (VITAMIN B-12) 1,000 mcg sublingual tablet Take 1,000 mcg by mouth daily. Provider, Historical  
 
Allergies Allergen Reactions  Ace Inhibitors Cough  Amlodipine Swelling  Avapro [Irbesartan] Unable to Obtain  Bactrim [Sulfamethoxazole-Trimethoprim] Other (comments) Sore mouth  Captopril Cough  Carvedilol Diarrhea [address]  Morphine Nausea and Vomiting and Swelling  Penicillins Hives  Pravachol [Pravastatin] Nausea Only  Seafood [Shellfish Containing Products] Hives  Singulair [Montelukast] Other (comments)  
  palpitations Family History Problem Relation Age of Onset  Cancer Mother  Heart Disease Mother  Alcohol abuse Father  Diabetes Brother  Hypertension Brother Social History Socioeconomic History  Marital status:  Spouse name: Not on file  Number of children: Not on file  Years of education: Not on file  Highest education level: Not on file Occupational History  Not on file Social Needs  Financial resource strain: Not on file  Food insecurity:  
  Worry: Not on file Inability: Not on file  Transportation needs:  
  Medical: Not on file Non-medical: Not on file Tobacco Use  Smoking status: Never Smoker  Smokeless tobacco: Never Used Substance and Sexual Activity  Alcohol use: Yes Comment: rare  Drug use: No  
 Sexual activity: Not on file Lifestyle  Physical activity:  
  Days per week: Not on file Minutes per session: Not on file  Stress: Not on file Relationships  Social connections:  
  Talks on phone: Not on file Gets together: Not on file Attends Scientologist service: Not on file Active member of club or organization: Not on file Attends meetings of clubs or organizations: Not on file Relationship status: Not on file  Intimate partner violence:  
  Fear of current or ex partner: Not on file Emotionally abused: Not on file Physically abused: Not on file Forced sexual activity: Not on file Other Topics Concern  Not on file Social History Narrative  Not on file Vitals:  
 02/11/20 1444 BP: 145/65 Pulse: 77 Resp: 16 Temp: 97.1 °F (36.2 °C) Review of Systems: 
 
Gen: No fever, chills, malaise, weight loss/gain. Heent: No headache, rhinorrhea, epistaxis, ear pain, hearing loss, sinus pain, neck pain/stiffness, sore throat. Heart: No chest pain, palpitations, CHOW, pnd, or orthopnea. Resp: No cough, hemoptysis, wheezing and shortness of breath. GI: No nausea, vomiting, diarrhea, constipation, melena or hematochezia. : No urinary obstruction, dysuria or hematuria. Derm: see below Musc/skeletal: no bone or joint complains. Vasc: No edema, cyanosis or claudication. Endo: No heat/cold intolerance, no polyuria,polydipsia or polyphagia. Neuro: No unilateral weakness, numbness, tingling. No seizures. Heme: No easy bruising or bleeding. Wound Description: 
Refer to nursing notes for measurements. Ulcer Debridement Left plantar foot wound Character of Ulcer: Improved Indication for Debridement: Slough, Exudate, Abnormal wound edge and Abnormal Wound base Pre debridement measurements: 2.2 x 1.8 x 0.3 cm Risks of procedure were discussed with patient. Consent has been signed. Anesthetic: Lidocaine 4% topical cream  
 
Level of Debridement: Subctaneous Tissue , muscle Tissue and/or Material removed: Exudate, Fibrin/ Slough and Subcutaneous, Type of Tissue: Non- Viable Pre-debridement severity: Fat Layer Exposed Post debridement severity: Fat Layer exposed Instrument(s) used: Scalpel Bleeding controlled with: Pressure Estimated blood loss: Minimal 
 
Pre debridement measurements: 2.3 x 1.9  x 0.4 cm Post procedural pain: mild Patient tolerated procedure well. Right  plantar foot wound Character of Ulcer: stable Indication for Debridement: Slough, Exudate, Abnormal wound edge and Abnormal Wound base Pre debridement measurements: 1 cm x 1 cm x 0.2 cm Risks of procedure were discussed with patient. Consent has been signed. Anesthetic: Lidocaine 4% topical cream  
 
Level of Debridement: Subctaneous Tissue Tissue and/or Material removed: Exudate, Fibrin/ Slough and Subcutaneous Type of Tissue: Non- Viable Pre-debridement severity: Fat Layer Exposed Post debridement severity: Fat Layer exposed Instrument(s) used: Scalpel Bleeding controlled with: Pressure Estimated blood loss: Minimal 
 
Postdebridement measurements: 1.1 cm x 1.1 cm x 0.3 cm Post procedural pain: mild Patient tolerated procedure well. Infection: none Edema: mild edema Lower Extremity Circulation Reviewed patient's recent ABIs. Shows decrease from when the  
 
Offloading: Offloading padding to shoes Assessment: 1. Diabetic plantar heel ulcer left foot with associated infection 2. Diabetic ulcer right foot-plantar 5th met base 3. Diabetic peripheral neuropathy 4. B/l cavus foot deformity 5. Right thigh and lower leg cellulitis with low grade fever Plan:  
-Patient seen and evaluated as noted above. Callus noted to plantar midfoot. Fortunately no new wound.   
-Wound debridement performed of both feet. -Fortunately both wounds are improving this week. Right heel is small. Left heel is developing a red granular base, with muscle no longer exposed. -Right foot was dressed with endoform, hydrofera blue, gauze dressing. Change every 3 days. 
-Endoform, hydrofera blue to both feet. -Follow-up in 1 week

## 2020-02-11 NOTE — WOUND CARE
02/11/20 1559 Wound Foot Right;Plantar;Lateral #2 07/30/19 Date First Assessed/Time First Assessed: 07/30/19 1530   Wound Approximate Age at First Assessment (Weeks): 24 weeks  Primary Wound Type: Diabetic Ulcer  Location: Foot  Wound Location Orientation: Right;Plantar;Lateral  Wound Description: #2  Date of. .. Dressing Type Applied Collagens/cell matrix;Foam;Gauze;Gauze wrap (arnie); Special tape (comment) 
(endoform,HFBR) Wound Heel Left #1 12/17/19 Date First Assessed/Time First Assessed: 12/17/19 1529   Wound Approximate Age at First Assessment (Weeks): 5 weeks  Primary Wound Type: Diabetic Ulcer  Location: Heel  Wound Location Orientation: Left  Wound Description: #1  Date of First Observation. .. Dressing Type Applied Collagens/cell matrix;Foam;Gauze;Gauze wrap (arnie); Special tape (comment) 
(endoform,HFBR)

## 2020-02-11 NOTE — DISCHARGE INSTRUCTIONS
Discharge Instructions/Wound [facility]  [address]  [place], [address] [facility]  Telephone: [phone]       Wound Care Orders:     Clean both wounds with NS, endoform, HFBR, gauze RG tape  Return to see MD in 1 weeks    Treatment Orders:   Elevate leg(s) above the level of the heart when sitting, if swelling present. Avoid prolonged standing in one place. Wear off-loading shoe/boot on the affected foot when walking. Do not get dressing/cast wet. Do not use objects to scratch inside cast/wrap. Follow diet as prescribed:  [x] Diet as tolerated: [x] Diabetic Diet: Low carb no sugar [] No Added Salt:  [] Increase Protein: [] Other:                Follow-up:  MD visit in 1 week       Electronically signed Jessica Stewart RN on 2/11/2020 at 3:43 PM     [address] [facility] Information: Should you experience any significant changes in your wound(s) or have questions about your wound care, please contact the [address] [place] [address] at [address] [place] [address] 8:00 am - 4:30. If you need help with your wound outside these hours and cannot wait until we are again available, contact your PCP or go to the hospital emergency room. PLEASE NOTE: IF YOU ARE UNABLE TO OBTAIN WOUND SUPPLIES, CONTINUE TO USE THE SUPPLIES YOU HAVE AVAILABLE UNTIL YOU ARE ABLE TO REACH US. IT IS MOST IMPORTANT TO KEEP THE WOUND COVERED AT ALL TIMES.      Physician Signature:_______________________    Date: ___________ Time:  ____________

## 2020-02-18 ENCOUNTER — HOSPITAL ENCOUNTER (OUTPATIENT)
Dept: WOUND CARE | Age: 60
Discharge: HOME | End: 2020-02-18
Payer: COMMERCIAL

## 2020-02-18 VITALS
TEMPERATURE: 96.6 F | HEART RATE: 79 BPM | SYSTOLIC BLOOD PRESSURE: 180 MMHG | RESPIRATION RATE: 16 BRPM | DIASTOLIC BLOOD PRESSURE: 79 MMHG

## 2020-02-18 PROCEDURE — 11042 DBRDMT SUBQ TIS 1ST 20SQCM/<: CPT

## 2020-02-18 RX ORDER — LIDOCAINE HYDROCHLORIDE 10 MG/ML
1 INJECTION INFILTRATION; PERINEURAL ONCE
Status: COMPLETED | OUTPATIENT
Start: 2020-02-18 | End: 2020-02-18

## 2020-02-18 RX ADMIN — LIDOCAINE HYDROCHLORIDE 1 ML: 10 INJECTION INFILTRATION; PERINEURAL at 16:00

## 2020-02-18 NOTE — WOUND CARE
02/18/20 1452 Wound Heel Left #1 12/17/19 Date First Assessed/Time First Assessed: 12/17/19 1529   Wound Approximate Age at First Assessment (Weeks): 5 weeks  Primary Wound Type: Diabetic Ulcer  Location: Heel  Wound Location Orientation: Left  Wound Description: #1  Date of First Observation. .. Dressing Status Removed Dressing Type  
(Endoform, HFBR, Gauze Roll gauze) Non-staged Wound Description Full thickness Wound Length (cm) 2 cm Wound Width (cm) 1.7 cm Wound Depth (cm) 0.2 cm Wound Surface Area (cm^2) 3.4 cm^2 Wound Volume (cm^3) 0.68 cm^3 Condition of Base Pink Condition of Edges Open Undermining (cm) 0.3 cm Direction of Undermining 5 o'clock;10 o'clock Drainage Amount Moderate Drainage Color Serosanguinous Wound Odor None Sandra-wound Assessment Maceration Cleansing and Cleansing Agents  Normal saline Wound Foot Right;Plantar;Lateral #2 07/30/19 Date First Assessed/Time First Assessed: 07/30/19 1530   Wound Approximate Age at First Assessment (Weeks): 24 weeks  Primary Wound Type: Diabetic Ulcer  Location: Foot  Wound Location Orientation: Right;Plantar;Lateral  Wound Description: #2  Date of. .. Dressing Status Removed Dressing Type  
(Endoform, HFBR, Gauze Roll gauze) Non-staged Wound Description Full thickness Wound Length (cm) 1 cm Wound Width (cm) 0.9 cm Wound Depth (cm) 0.2 cm Wound Surface Area (cm^2) 0.9 cm^2 Wound Volume (cm^3) 0.18 cm^3 Condition of Base Pink Condition of Edges Calloused Drainage Amount Moderate Drainage Color Serosanguinous Wound Odor None Sandra-wound Assessment Intact Cleansing and Cleansing Agents  Normal saline Visit Vitals /79 (BP 1 Location: Left arm, BP Patient Position: At rest;Sitting) Pulse 79 Temp 96.6 °F (35.9 °C) Resp 16

## 2020-02-18 NOTE — DISCHARGE INSTRUCTIONS
Discharge Instructions/Wound [facility]  [address] [facility]  [place], [address]  Telephone: [phone]       Wound Care Orders:  Clean all wounds with NS and pat dry. .  Wound plantar left foot, arch, bordered foam.  Left heel wound, endoform, HFBR, gauze RG  Right foot wound, endoform, HFBR, gauze RG  Care to be done Q 2 days, Return to wound center in 1 week. Treatment Orders:   Elevate leg(s) above the level of the heart when sitting, if swelling present. Avoid prolonged standing in one place. Wear off-loading shoe/boot on the affected foot when walking. Do not get dressing/cast wet. Do not use objects to scratch inside cast/wrap. Follow diet as prescribed:  [x] Diet as tolerated: [x] Diabetic Diet: Low carb no sugar [] No Added Salt:  [] Increase Protein: [] Other:                Follow-up:  [x] Return Appointment: With Dr Bhavin Jones  in [address] [place] [address])  [] Ordered tests:        Electronically signed Javon Soto RN on 2/18/2020 at 3:30 PM     Marisela Cruz 281: Should you experience any significant changes in your wound(s) or have questions about your wound care, please contact the [address] [place] [address] at [address] [place] [address] 8:00 am - 4:30. If you need help with your wound outside these hours and cannot wait until we are again available, contact your PCP or go to the hospital emergency room. PLEASE NOTE: IF YOU ARE UNABLE TO OBTAIN WOUND SUPPLIES, CONTINUE TO USE THE SUPPLIES YOU HAVE AVAILABLE UNTIL YOU ARE ABLE TO REACH US. IT IS MOST IMPORTANT TO KEEP THE WOUND COVERED AT ALL TIMES.      Physician Signature:_______________________    Date: ___________ Time:  ____________

## 2020-02-18 NOTE — PROGRESS NOTES
Patient presents to wound clinic for: Oneal Andersen is a 60 y.o. female who presents for wound care of bilateral plantar foot ulcers. Patient currently being treated by Dr. Katy Gabriel for a wound of the left breast, and was referred to me for further offloading and wound care recommendations of the bilateral heel ulcers. The ulcer of the plantar left heel occurred first and patient was placed in a heel offloading shoe. She then subsequently developed an ulceration of the plantar 5th met base of the right foot. She has been wearing her new diabetic shoes without any issues to the right foot. Denies f/c/n/v/d. Notes that she has been staying off her feet. She has still been having some discomfort in the plantar left foot whether the TCC had been rubbing. Pertinent Medical History: 
Past Medical History:  
Diagnosis Date  Acquired lymphedema Right arm  Arrhythmia  Asthma  Atrial fibrillation ([address] Dr. Caresse Severin and Dr. Dawson Santacruz [facility])  Cancer ([address] bilateral breast  
 Chronic kidney disease  Diabetes mellitus type II   
 Dizziness  Essential hypertension  Hyperlipidemia  Hypertension  Long term (current) use of anticoagulants  Morbid obesity ([address] 3/14/2012  Osteoarthritis  Pacemaker  Sick sinus syndrome ([address] Past Surgical History:  
Procedure Laterality Date  ABDOMEN SURGERY PROC UNLISTED    
 gastric bypass  GASTRIC BYPASS,OBESE<150CM [facility]  [facility]08  
 [facility]  HX AFIB ABLATION    
 HX CARPAL TUNNEL RELEASE [address] [id number] [address] RIGHT MODIFIED RADICAL   
 HX MOHS PROCEDURES  1995  
 right  HX ORTHOPAEDIC    
 ight knee surgery  HX PACEMAKER    
 IR INSERT TUNL CVC W PORT OVER 5 YEARS    
 LAMINECTOMY,CERVICAL  1994  
 NEEDLE BIOPSY LIVER,W OTHR [address]  8.21.07  
 MA Shan 9462 AND 1994  TN TRABECULOPLASTY BY LASER SURGERY    
 US GUIDE ASP OVARIAN CYST ABD APPROACH  8.21.07  
 RIGHT Prior to Admission medications Medication Sig Start Date End Date Taking? Authorizing Provider  
warfarin (COUMADIN) 4 mg tablet Take one and half tablets today (Feb 11), then 1 tablet on Sun and Wed and a half tablet the other 5 days. 2/11/20  Yes Arnol Hutchinson MD  
acetaminophen 500 mg tab 500 mg, diphenhydrAMINE 25 mg cap 25 mg Take 2 Doses by mouth nightly. Yes Provider, Historical  
potassium chloride SR (KLOR-CON 10) 10 mEq tablet Take 10 mEq by mouth daily. Indications: Patient states she intends to take this medication untl she discusses with Dr. Taya Gudino on 12-12-19. Yes Provider, Historical  
insulin glargine (LANTUS U-100 INSULIN) 100 unit/mL injection Inject 20 units daily 11/10/19  Yes Arnol Hutchinson MD  
hydrALAZINE (APRESOLINE) 100 mg tablet TAKE ONE TABLET BY MOUTH THREE TIMES A DAY 10/21/19  Yes Arnol Hutchinson MD  
sertraline (ZOLOFT) 50 mg tablet TAKE ONE AND ONE-HALF (1 & 1/2) TABLET BY MOUTH DAILY 8/22/19  Yes Arnol Hutchinson MD  
bumetanide (BUMEX) 1 mg tablet Take 2 mg by mouth daily. Yes Provider, Historical  
cloNIDine HCl (CATAPRES) 0.3 mg tablet Take 0.6 mg by mouth nightly. Yes Provider, Historical  
metoprolol succinate (TOPROL-XL) 100 mg tablet TAKE TWO TABLETS BY MOUTH DAILY 7/12/19  Yes Arnol Hutchinson MD  
doxazosin (CARDURA) 4 mg tablet TAKE ONE TABLET BY MOUTH ONCE NIGHTLY 6/14/19  Yes Arnol Hutchinson MD  
busPIRone (BUSPAR) 10 mg tablet TAKE ONE TABLET BY MOUTH TWICE A DAY 5/24/19  Yes Arnol Hutchinson MD  
cholecalciferol, VITAMIN D3, (VITAMIN D3) 5,000 unit tab tablet Take 5,000 Units by mouth daily. Yes Provider, Historical  
vitamin A 8,000 unit capsule Take 8,000 Units by mouth daily.    Yes Provider, Historical  
insulin lispro (HUMALOG) 100 unit/mL kwikpen 2 units with dinner for sugars over 200 1/3/14  Yes Ghulam Mas MD  
cyanocobalamin (VITAMIN B-12) 1,000 mcg sublingual tablet Take 1,000 mcg by mouth daily. Yes Provider, Historical  
metolazone (ZAROXOLYN) 5 mg tablet Take 1 Tab by mouth daily as needed (take if develop increased LE edema, weight gain > 5 pounds. ). 4/10/14   Canelo Palomo NP Allergies Allergen Reactions  Ace Inhibitors Cough  Amlodipine Swelling  Avapro [Irbesartan] Unable to Obtain  Bactrim [Sulfamethoxazole-Trimethoprim] Other (comments) Sore mouth  Captopril Cough  Carvedilol Diarrhea [address]  Morphine Nausea and Vomiting and Swelling  Penicillins Hives  Pravachol [Pravastatin] Nausea Only  Seafood [Shellfish Containing Products] Hives  Singulair [Montelukast] Other (comments)  
  palpitations Family History Problem Relation Age of Onset  Cancer Mother  Heart Disease Mother  Alcohol abuse Father  Diabetes Brother  Hypertension Brother Social History Socioeconomic History  Marital status:  Spouse name: Not on file  Number of children: Not on file  Years of education: Not on file  Highest education level: Not on file Occupational History  Not on file Social Needs  Financial resource strain: Not on file  Food insecurity:  
  Worry: Not on file Inability: Not on file  Transportation needs:  
  Medical: Not on file Non-medical: Not on file Tobacco Use  Smoking status: Never Smoker  Smokeless tobacco: Never Used Substance and Sexual Activity  Alcohol use: Yes Comment: rare  Drug use: No  
 Sexual activity: Not on file Lifestyle  Physical activity:  
  Days per week: Not on file Minutes per session: Not on file  Stress: Not on file Relationships  Social connections:  
  Talks on phone: Not on file Gets together: Not on file Attends Mandaeism service: Not on file Active member of club or organization: Not on file Attends meetings of clubs or organizations: Not on file Relationship status: Not on file  Intimate partner violence:  
  Fear of current or ex partner: Not on file Emotionally abused: Not on file Physically abused: Not on file Forced sexual activity: Not on file Other Topics Concern  Not on file Social History Narrative  Not on file Vitals:  
 02/18/20 1447 BP: 180/79 Pulse: 79 Resp: 16 Temp: 96.6 °F (35.9 °C) Review of Systems: 
 
Gen: No fever, chills, malaise, weight loss/gain. Heent: No headache, rhinorrhea, epistaxis, ear pain, hearing loss, sinus pain, neck pain/stiffness, sore throat. Heart: No chest pain, palpitations, CHOW, pnd, or orthopnea. Resp: No cough, hemoptysis, wheezing and shortness of breath. GI: No nausea, vomiting, diarrhea, constipation, melena or hematochezia. : No urinary obstruction, dysuria or hematuria. Derm: see below Musc/skeletal: no bone or joint complains. Vasc: No edema, cyanosis or claudication. Endo: No heat/cold intolerance, no polyuria,polydipsia or polyphagia. Neuro: No unilateral weakness, numbness, tingling. No seizures. Heme: No easy bruising or bleeding. Wound Description: 
Refer to nursing notes for measurements. Ulcer Debridement Left plantar foot wound Character of Ulcer: Improved Indication for Debridement: Slough, Exudate, Abnormal wound edge and Abnormal Wound base Pre debridement measurements: 2 x 1.7 x 0.2 cm Risks of procedure were discussed with patient. Consent has been signed. Anesthetic: Lidocaine 4% topical cream  
 
Level of Debridement: Subctaneous Tissue , muscle Tissue and/or Material removed: Exudate, Fibrin/ Slough and Subcutaneous, Type of Tissue: Non- Viable Pre-debridement severity: Fat Layer Exposed Post debridement severity: Fat Layer exposed Instrument(s) used: Scalpel Bleeding controlled with: Pressure Estimated blood loss: Minimal 
 
Pre debridement measurements: 2.1 x 1.8 x 0.3 cm Post procedural pain: mild Patient tolerated procedure well. Right  plantar foot wound Character of Ulcer: stable Indication for Debridement: Slough, Exudate, Abnormal wound edge and Abnormal Wound base Pre debridement measurements: 1 cm x 0.9 cm x 0.2 cm Risks of procedure were discussed with patient. Consent has been signed. Anesthetic: Lidocaine 4% topical cream  
 
Level of Debridement: Subctaneous Tissue Tissue and/or Material removed: Exudate, Fibrin/ Slough and Subcutaneous Type of Tissue: Non- Viable Pre-debridement severity: Fat Layer Exposed Post debridement severity: Fat Layer exposed Instrument(s) used: Scalpel Bleeding controlled with: Pressure Estimated blood loss: Minimal 
 
Postdebridement measurements: 1.1 cm x 1.1 cm x 0.3 cm Post procedural pain: mild Patient tolerated procedure well. Infection: none Edema: mild edema Lower Extremity Circulation Reviewed patient's recent ABIs. Shows decrease from when the  
 
Offloading: Offloading padding to shoes Assessment: 1. Diabetic plantar heel ulcer left foot with associated infection 2. Diabetic ulcer right foot-plantar 5th met base 3. Diabetic peripheral neuropathy 4. B/l cavus foot deformity 5. Right thigh and lower leg cellulitis with low grade fever Plan:  
-Patient seen and evaluated as noted above. Superficial breakdown to the midfoot. No fluid aspirated from area. Patient likely feelign pain from bruising to the area. -Wound debridement performed of both feet. -Fortunately both wounds are improving this week. Right heel is small. Left heel is developing a red granular base, with muscle no longer exposed. -Right foot was dressed with endoform, hydrofera blue, gauze dressing. Change every 3 days. 
-Endoform, hydrofera blue to both feet. -Follow-up in 1 week

## 2020-02-25 ENCOUNTER — HOSPITAL ENCOUNTER (OUTPATIENT)
Dept: WOUND CARE | Age: 60
Discharge: HOME | End: 2020-02-25
Payer: COMMERCIAL

## 2020-02-25 PROBLEM — S90.32XA: Status: ACTIVE | Noted: 2020-02-25

## 2020-02-25 PROCEDURE — 11042 DBRDMT SUBQ TIS 1ST 20SQCM/<: CPT

## 2020-02-25 NOTE — DISCHARGE INSTRUCTIONS
Discharge Instructions/Wound [facility]  [address]  [place], [address] [facility]  Telephone: [phone]       Wound Care Orders:  Left plantar midfoot. . Clean wound with NS, pat dry. .  apply border foam.. Pad generously. Left plantar heel. . clean wound with NS,  pat dry. Melva Cho apply endoform, HFBR, gauze RG  Right plantar foot  Clean wound with NS, pat dry. apply endoform, HFBR, gauze RG. Care to be done every other day                Follow-up:  [x] Return Appointment: With Dr Ashely Tyler  in [address] [place] [address])       Electronically signed Jessica Stewart RN on 2/25/2020 at 3:03 PM     [facility] Information: Should you experience any significant changes in your wound(s) or have questions about your wound care, please contact the [address] [place] [address] at [address] [place] [address] 8:00 am - 4:30. If you need help with your wound outside these hours and cannot wait until we are again available, contact your PCP or go to the hospital emergency room. PLEASE NOTE: IF YOU ARE UNABLE TO OBTAIN WOUND SUPPLIES, CONTINUE TO USE THE SUPPLIES YOU HAVE AVAILABLE UNTIL YOU ARE ABLE TO REACH US. IT IS MOST IMPORTANT TO KEEP THE WOUND COVERED AT ALL TIMES.      Physician Signature:_______________________    Date: ___________ Time:  ____________

## 2020-02-25 NOTE — WOUND CARE
02/25/20 1415 Wound Foot Plantar;Mid;Left arch of foot 02/18/20 Date First Assessed/Time First Assessed: 02/18/20 1516   Wound Approximate Age at First Assessment (Weeks): 1 weeks  Primary Wound Type: Friction  Location: Foot  Wound Location Orientation: Plantar;Mid;Left  Wound Description: arch of foot  Date of F. .. Dressing Status Intact Dressing Type Gauze Wound Length (cm) 1.5 cm Wound Width (cm) 3.1 cm Wound Depth (cm) 0.2 cm Wound Surface Area (cm^2) 4.65 cm^2 Wound Volume (cm^3) 0.93 cm^3 Condition of Base Eschar;Slough;Pink Condition of Edges Open Drainage Amount Moderate Drainage Color Serosanguinous Wound Odor None Sandra-wound Assessment Maceration Cleansing and Cleansing Agents  Normal saline Wound Heel Left #1 12/17/19 Date First Assessed/Time First Assessed: 12/17/19 1529   Wound Approximate Age at First Assessment (Weeks): 5 weeks  Primary Wound Type: Diabetic Ulcer  Location: Heel  Wound Location Orientation: Left  Wound Description: #1  Date of First Observation. .. Dressing Status Removed Dressing Type Gauze;Gauze wrap (arnie); Collagens/cell matrix Wound Length (cm) 2 cm Wound Width (cm) 1.9 cm Wound Depth (cm) 0.4 cm Wound Surface Area (cm^2) 3.8 cm^2 Wound Volume (cm^3) 1.52 cm^3 Condition of Base Pink Condition of Edges Open Drainage Amount Moderate Drainage Color Serosanguinous Wound Odor None Sandra-wound Assessment Maceration Cleansing and Cleansing Agents  Normal saline Wound Foot Right;Plantar;Lateral #2 07/30/19 Date First Assessed/Time First Assessed: 07/30/19 1530   Wound Approximate Age at First Assessment (Weeks): 24 weeks  Primary Wound Type: Diabetic Ulcer  Location: Foot  Wound Location Orientation: Right;Plantar;Lateral  Wound Description: #2  Date of. .. Dressing Status Removed Dressing Type Gauze;Gauze wrap (arnie); Collagens/cell matrix Wound Length (cm) 0.9 cm Wound Width (cm) 1.1 cm  
 Wound Depth (cm) 0.3 cm Wound Surface Area (cm^2) 0.99 cm^2 Wound Volume (cm^3) 0.3 cm^3 Condition of Base Pink Condition of Edges Calloused Drainage Amount Moderate Drainage Color Serosanguinous Wound Odor None Sandra-wound Assessment Intact Cleansing and Cleansing Agents  Normal saline

## 2020-02-25 NOTE — PROGRESS NOTES
Patient presents to wound clinic for: Francisco Weiss is a 60 y.o. female who presents for wound care of bilateral plantar foot ulcers. Patient currently being treated by Dr. Srhuthi Hubbard for a wound of the left breast, and was referred to me for further offloading and wound care recommendations of the bilateral heel ulcers. The ulcer of the plantar left heel occurred first and patient was placed in a heel offloading shoe. She then subsequently developed an ulceration of the plantar 5th met base of the right foot. She has been wearing her new diabetic shoes without any issues to the right foot. Denies f/c/n/v/d. Notes that she has been staying off her feet. She has still been having some discomfort in the plantar left foot whether the TCC had been rubbing. Pertinent Medical History: 
Past Medical History:  
Diagnosis Date  Acquired lymphedema Right arm  Arrhythmia  Asthma  Atrial fibrillation ([facility] [address] Dr. Mattie Farooq and Dr. Rusty Zhu [facility])  Cancer ([facility] [address] bilateral breast  
 Chronic kidney disease  Diabetes mellitus type II   
 Dizziness  Essential hypertension  Hyperlipidemia  Hypertension  Long term (current) use of anticoagulants  Morbid obesity ([address] 3/14/2012  Osteoarthritis  Pacemaker  Sick sinus syndrome ([facility] [address] Past Surgical History:  
Procedure Laterality Date  ABDOMEN SURGERY PROC UNLISTED    
 gastric bypass  GASTRIC BYPASS,OBESE<150CM [facility]  [facility]  
 [facility]  HX AFIB ABLATION    
 HX CARPAL TUNNEL RELEASE [address] RIGHT MODIFIED RADICAL   
 HX MOHS PROCEDURES  1995  
 right  HX ORTHOPAEDIC    
 ight knee surgery  HX PACEMAKER    
 IR INSERT TUNL CVC W PORT OVER 5 YEARS    
 LAMINECTOMY,CERVICAL  1994  
 NEEDLE BIOPSY LIVER,W OTHR [address]  8.21.07  
 [place] [address] [id number] AND 1994  [place] TRABECULOPLASTY BY LASER SURGERY    
 US GUIDE ASP OVARIAN CYST ABD APPROACH  8.21.07  
 RIGHT Prior to Admission medications Medication Sig Start Date End Date Taking? Authorizing Provider  
warfarin (COUMADIN) 4 mg tablet Take one and half tablets today (Feb 11), then 1 tablet on Sun and Wed and a half tablet the other 5 days. 2/11/20  Yes Toni Russell MD  
acetaminophen 500 mg tab 500 mg, diphenhydrAMINE 25 mg cap 25 mg Take 2 Doses by mouth nightly. Yes Provider, Historical  
potassium chloride SR (KLOR-CON 10) 10 mEq tablet Take 10 mEq by mouth daily. Indications: Patient states she intends to take this medication untl she discusses with Dr. Mag William on 12-12-19. Yes Provider, Historical  
insulin glargine (LANTUS U-100 INSULIN) 100 unit/mL injection Inject 20 units daily 11/10/19  Yes Toni Russell MD  
hydrALAZINE (APRESOLINE) 100 mg tablet TAKE ONE TABLET BY MOUTH THREE TIMES A DAY 10/21/19  Yes Toni Russell MD  
sertraline (ZOLOFT) 50 mg tablet TAKE ONE AND ONE-HALF (1 & 1/2) TABLET BY MOUTH DAILY 8/22/19  Yes Toni Russell MD  
bumetanide (BUMEX) 1 mg tablet Take 2 mg by mouth daily. Yes Provider, Historical  
cloNIDine HCl (CATAPRES) 0.3 mg tablet Take 0.6 mg by mouth nightly. Yes Provider, Historical  
metoprolol succinate (TOPROL-XL) 100 mg tablet TAKE TWO TABLETS BY MOUTH DAILY 7/12/19  Yes Toni Russell MD  
doxazosin (CARDURA) 4 mg tablet TAKE ONE TABLET BY MOUTH ONCE NIGHTLY 6/14/19  Yes Toni Russell MD  
busPIRone (BUSPAR) 10 mg tablet TAKE ONE TABLET BY MOUTH TWICE A DAY 5/24/19  Yes Toni Russell MD  
metolazone (ZAROXOLYN) 5 mg tablet Take 1 Tab by mouth daily as needed (take if develop increased LE edema, weight gain > 5 pounds. ). 4/10/14  Yes Genevieve Sutton NP  
cholecalciferol, VITAMIN D3, (VITAMIN D3) 5,000 unit tab tablet Take 5,000 Units by mouth daily.    Yes Provider, Historical  
 vitamin A 8,000 unit capsule Take 8,000 Units by mouth daily. Yes Provider, Historical  
insulin lispro (HUMALOG) 100 unit/mL kwikpen 2 units with dinner for sugars over 200 1/3/14  Yes Lyssa Corbin MD  
cyanocobalamin (VITAMIN B-12) 1,000 mcg sublingual tablet Take 1,000 mcg by mouth daily. Yes Provider, Historical  
 
Allergies Allergen Reactions  Ace Inhibitors Cough  Amlodipine Swelling  Avapro [Irbesartan] Unable to Obtain  Bactrim [Sulfamethoxazole-Trimethoprim] Other (comments) Sore mouth  Captopril Cough  Carvedilol Diarrhea [address]  Morphine Nausea and Vomiting and Swelling  Penicillins Hives  Pravachol [Pravastatin] Nausea Only  Seafood [Shellfish Containing Products] Hives  Singulair [Montelukast] Other (comments)  
  palpitations Family History Problem Relation Age of Onset  Cancer Mother  Heart Disease Mother  Alcohol abuse Father  Diabetes Brother  Hypertension Brother Social History Socioeconomic History  Marital status:  Spouse name: Not on file  Number of children: Not on file  Years of education: Not on file  Highest education level: Not on file Occupational History  Not on file Social Needs  Financial resource strain: Not on file  Food insecurity:  
  Worry: Not on file Inability: Not on file  Transportation needs:  
  Medical: Not on file Non-medical: Not on file Tobacco Use  Smoking status: Never Smoker  Smokeless tobacco: Never Used Substance and Sexual Activity  Alcohol use: Yes Comment: rare  Drug use: No  
 Sexual activity: Not on file Lifestyle  Physical activity:  
  Days per week: Not on file Minutes per session: Not on file  Stress: Not on file Relationships  Social connections:  
  Talks on phone: Not on file Gets together: Not on file Attends Restorationist service: Not on file Active member of club or organization: Not on file Attends meetings of clubs or organizations: Not on file Relationship status: Not on file  Intimate partner violence:  
  Fear of current or ex partner: Not on file Emotionally abused: Not on file Physically abused: Not on file Forced sexual activity: Not on file Other Topics Concern  Not on file Social History Narrative  Not on file There were no vitals filed for this visit. Review of Systems: 
 
Gen: No fever, chills, malaise, weight loss/gain. Heent: No headache, rhinorrhea, epistaxis, ear pain, hearing loss, sinus pain, neck pain/stiffness, sore throat. Heart: No chest pain, palpitations, CHOW, pnd, or orthopnea. Resp: No cough, hemoptysis, wheezing and shortness of breath. GI: No nausea, vomiting, diarrhea, constipation, melena or hematochezia. : No urinary obstruction, dysuria or hematuria. Derm: see below Musc/skeletal: no bone or joint complains. Vasc: No edema, cyanosis or claudication. Endo: No heat/cold intolerance, no polyuria,polydipsia or polyphagia. Neuro: No unilateral weakness, numbness, tingling. No seizures. Heme: No easy bruising or bleeding. Wound Description: 
Refer to nursing notes for measurements. Ulcer Debridement Left plantar foot wound Character of Ulcer: stable Indication for Debridement: Slough, Exudate, Abnormal wound edge and Abnormal Wound base Pre debridement measurements: 2 x 1.9 x 0.4 cm Risks of procedure were discussed with patient. Consent has been signed. Anesthetic: Lidocaine 4% topical cream  
 
Level of Debridement: Subctaneous Tissue , muscle Tissue and/or Material removed: Exudate, Fibrin/ Slough and Subcutaneous, Type of Tissue: Non- Viable Pre-debridement severity: Fat Layer Exposed Post debridement severity: Fat Layer exposed Instrument(s) used: Scalpel Bleeding controlled with: Pressure Estimated blood loss: Minimal 
 
Pre debridement measurements: 2.1 x 2 x 0.5 cm Post procedural pain: mild Patient tolerated procedure well. Right  plantar foot wound Character of Ulcer: stable Indication for Debridement: Slough, Exudate, Abnormal wound edge and Abnormal Wound base Pre debridement measurements: 0.9 cm x 1.1 cm x 0.3 cm Risks of procedure were discussed with patient. Consent has been signed. Anesthetic: Lidocaine 4% topical cream  
 
Level of Debridement: Subctaneous Tissue Tissue and/or Material removed: Exudate, Fibrin/ Slough and Subcutaneous Type of Tissue: Non- Viable Pre-debridement severity: Fat Layer Exposed Post debridement severity: Fat Layer exposed Instrument(s) used: Scalpel Bleeding controlled with: Pressure Estimated blood loss: Minimal 
 
Postdebridement measurements: 1.0 cm x 1.2 cm x 0.4 cm Post procedural pain: mild Patient tolerated procedure well. Infection: none Edema: mild edema Lower Extremity Circulation Reviewed patient's recent ABIs. Shows decrease from when the  
 
Offloading: Offloading padding to shoes Assessment: 
 Diabetic plantar heel ulcer left foot Possible hematoma of left midfoot Diabetic ulcer right foot-plantar 5th met base Diabetic peripheral neuropathy B/l cavus foot deformity Plan:  
-Patient seen and evaluated as noted above. Patient has superficial skin breakdown in area of left midfoot. No fluid aspirated last week. Area is concerning for possible underlying collection but does not have any erythema or swelling. Could possible be due to a coagulated hematoma due to prior injury to area from cast along with warfarin use. Will order x-rays and ultrasound of area for further assessment. 
-Wound debridement performed of both feet. -Previous wounds of both feet are stable this week. -Right foot was dressed with endoform, hydrofera blue, gauze dressing. Change every 3 days. 
-Endoform, hydrofera blue to both feet. -Follow-up in 1 week

## 2020-02-26 ENCOUNTER — TELEPHONE (OUTPATIENT)
Dept: INTERNAL MEDICINE CLINIC | Facility: CLINIC | Age: 60
End: 2020-02-26

## 2020-02-26 DIAGNOSIS — I48.0 PAF (PAROXYSMAL ATRIAL FIBRILLATION) (HCC): ICD-10-CM

## 2020-02-26 DIAGNOSIS — Z79.01 LONG TERM (CURRENT) USE OF ANTICOAGULANTS: Primary | ICD-10-CM

## 2020-02-26 LAB
INR PPP: 5 (ref 0.8–1.2)
PROTHROMBIN TIME: 45.4 SEC (ref 9.1–12)

## 2020-02-26 NOTE — PROGRESS NOTES
Pt notified of results- dose confirmed. Pt completed ABX approx \"1/2 week ago\". Will skip today and tomorrow's coumadin and begin again on Friday with 1/2 tab daily (4mg tabs) until nurse visit w/ Shanice King next week for POC INR.

## 2020-02-26 NOTE — PROGRESS NOTES
Inform patient INR is too high. Confirm current dose of warfarin 4 mg tablets 1 tablet on Sun and Wed and a half tablet the other 5 days. . Change warfarin 4 mg tablets to none today (Feb 26) or tomorrow (Feb 27),  then a half tablet daily. .  Repeat INR in  7 days. Are you taking an anitbiotic? If so remember to notify us at start of therapy so we can check INR a few days after starting it. Patient informed in My Chart. Nurse will call as well.

## 2020-02-26 NOTE — TELEPHONE ENCOUNTER
Lab Marylou call with panic value. INR 5.0. Verify current dose of warfarin 4 mg tablets, 1 tablet on Sun and Wed and a half tablet the other 5 days. Determine if she is taking antibiotic. If so remind her to notify us when this occurs so we can check INR shortly after starting it. For now: no warfarin today or tomorrow, then a half tablet daily. Repeat INR in 7 days.

## 2020-03-03 ENCOUNTER — HOSPITAL ENCOUNTER (OUTPATIENT)
Dept: GENERAL RADIOLOGY | Age: 60
Discharge: HOME | End: 2020-03-03
Attending: PODIATRIST
Payer: COMMERCIAL

## 2020-03-03 ENCOUNTER — HOSPITAL ENCOUNTER (OUTPATIENT)
Dept: ULTRASOUND IMAGING | Age: 60
Discharge: HOME | End: 2020-03-03
Attending: PODIATRIST
Payer: COMMERCIAL

## 2020-03-03 ENCOUNTER — HOSPITAL ENCOUNTER (OUTPATIENT)
Dept: WOUND CARE | Age: 60
Discharge: HOME | End: 2020-03-03
Payer: COMMERCIAL

## 2020-03-03 VITALS
SYSTOLIC BLOOD PRESSURE: 146 MMHG | HEART RATE: 60 BPM | RESPIRATION RATE: 16 BRPM | DIASTOLIC BLOOD PRESSURE: 65 MMHG | TEMPERATURE: 97.6 F

## 2020-03-03 DIAGNOSIS — S90.32XA CONTUSION OF LEFT FOOT: ICD-10-CM

## 2020-03-03 DIAGNOSIS — L03.115 CELLULITIS OF RIGHT LOWER EXTREMITY: ICD-10-CM

## 2020-03-03 DIAGNOSIS — L97.422 ULCER OF LEFT HEEL, WITH FAT LAYER EXPOSED (HCC): ICD-10-CM

## 2020-03-03 DIAGNOSIS — T14.8XXA HEMATOMA: ICD-10-CM

## 2020-03-03 PROBLEM — L97.423: Status: ACTIVE | Noted: 2020-03-03

## 2020-03-03 PROBLEM — E11.621: Status: ACTIVE | Noted: 2020-03-03

## 2020-03-03 PROCEDURE — 11043 DBRDMT MUSC&/FSCA 1ST 20/<: CPT

## 2020-03-03 PROCEDURE — 73630 X-RAY EXAM OF FOOT: CPT

## 2020-03-03 PROCEDURE — 76882 US LMTD JT/FCL EVL NVASC XTR: CPT

## 2020-03-03 NOTE — WOUND CARE
03/03/20 1536 Wound Foot Plantar;Mid;Left arch of foot 02/18/20 Date First Assessed/Time First Assessed: 02/18/20 1516   Wound Approximate Age at First Assessment (Weeks): 1 weeks  Primary Wound Type: Friction  Location: Foot  Wound Location Orientation: Plantar;Mid;Left  Wound Description: arch of foot  Date of F. .. Dressing Status Removed Dressing Type Gauze;Gauze wrap (arnie); Special tape (comment) Wound Length (cm) 3.6 cm Wound Width (cm) 4.2 cm Wound Depth (cm) 0.1 cm Wound Surface Area (cm^2) 15.12 cm^2 Wound Volume (cm^3) 1.51 cm^3 Change in Wound Size % -225.16 Condition of Base Pink;Slough Condition of Edges Open Undermining (cm) 0.2 cm Direction of Undermining 11 o'clock ( to 2) Drainage Amount Moderate Drainage Color Serosanguinous Wound Odor None Sandra-wound Assessment Maceration Cleansing and Cleansing Agents  Normal saline Wound Heel Left #1 12/17/19 Date First Assessed/Time First Assessed: 12/17/19 1529   Wound Approximate Age at First Assessment (Weeks): 5 weeks  Primary Wound Type: Diabetic Ulcer  Location: Heel  Wound Location Orientation: Left  Wound Description: #1  Date of First Observation. .. Dressing Status Removed Dressing Type Gauze;Gauze wrap (arnie); Special tape (comment) Wound Length (cm) 2.1 cm Wound Width (cm) 1.2 cm Wound Depth (cm) 0.4 cm Wound Surface Area (cm^2) 2.52 cm^2 Wound Volume (cm^3) 1.01 cm^3 Change in Wound Size % 52 Condition of Base Pink Condition of Edges Open Drainage Amount Moderate Drainage Color Serosanguinous Wound Odor None Sandra-wound Assessment Maceration Cleansing and Cleansing Agents  Normal saline Wound Foot Right;Plantar;Lateral #2 07/30/19 Date First Assessed/Time First Assessed: 07/30/19 1530   Wound Approximate Age at First Assessment (Weeks): 24 weeks  Primary Wound Type: Diabetic Ulcer  Location: Foot  Wound Location Orientation: Right;Plantar;Lateral Wound Description: #2  Date of. .. Dressing Status Removed Dressing Type Gauze;Gauze wrap (arnie); Special tape (comment) Wound Length (cm) 0.9 cm Wound Width (cm) 0.9 cm Wound Depth (cm) 0.3 cm Wound Surface Area (cm^2) 0.81 cm^2 Wound Volume (cm^3) 0.24 cm^3 Change in Wound Size % 15.62 Condition of Base Pink Condition of Edges Calloused Drainage Amount Moderate Drainage Color Serosanguinous Wound Odor None Sandra-wound Assessment Intact Cleansing and Cleansing Agents  Normal saline Visit Vitals /65 Pulse 60 Temp 97.6 °F (36.4 °C) Resp 16

## 2020-03-03 NOTE — PROGRESS NOTES
Patient presents to wound clinic for: Nohelia Brock is a 60 y.o. female who presents for wound care of bilateral plantar foot ulcers. Patient currently being treated by Dr. Petey Short for a wound of the left breast, and was referred to me for further offloading and wound care recommendations of the bilateral heel ulcers. The ulcer of the plantar left heel occurred first and patient was placed in a heel offloading shoe. She then subsequently developed an ulceration of the plantar 5th met base of the right foot. She has beenrecently developed a left midfoot ulcer from the total contact cast.  Denies f/c/n/v/d. Notes that she has been on her feet more today. Presents in converse instead of her diabetic shoes. Had her ultrasound and left foot x-rays earlier today Pertinent Medical History: 
Past Medical History:  
Diagnosis Date  Acquired lymphedema Right arm  Arrhythmia  Asthma  Atrial fibrillation ([facility] [address] Dr. José Miguel Ponce and Dr. [place] [facility])  Cancer ([facility] [address] bilateral breast  
 Chronic kidney disease  Diabetes mellitus type II   
 Dizziness  Essential hypertension  Hyperlipidemia  Hypertension  Long term (current) use of anticoagulants  Morbid obesity ([address] 3/14/2012  Osteoarthritis  Pacemaker  Sick sinus syndrome ([facility] [address] Past Surgical History:  
Procedure Laterality Date  ABDOMEN SURGERY PROC UNLISTED    
 gastric bypass  GASTRIC BYPASS,OBESE<150CM SAW-EN-Y  7/08  
 [facility]  HX AFIB ABLATION    
 HX CARPAL TUNNEL RELEASE [address] RIGHT MODIFIED RADICAL   
 HX MOHS PROCEDURES  1995  
 right  HX ORTHOPAEDIC    
 ight knee surgery  HX PACEMAKER    
 IR INSERT TUNL CVC W PORT OVER 5 YEARS    
 LAMINECTOMY,CERVICAL  1994  
 NEEDLE BIOPSY LIVER,W OTHR [address]  8.21.07  
 [place] [address]462 AND 1994  [place] TRABECULOPLASTY BY LASER SURGERY    
 US GUIDE ASP OVARIAN CYST ABD APPROACH  8.21.07  
 RIGHT Prior to Admission medications Medication Sig Start Date End Date Taking? Authorizing Provider  
warfarin (COUMADIN) 4 mg tablet Take one and half tablets today (Feb 11), then 1 tablet on Sun and Wed and a half tablet the other 5 days. 2/11/20  Yes Sushil Mccain MD  
acetaminophen 500 mg tab 500 mg, diphenhydrAMINE 25 mg cap 25 mg Take 2 Doses by mouth nightly. Yes Provider, Historical  
potassium chloride SR (KLOR-CON 10) 10 mEq tablet Take 10 mEq by mouth daily. Indications: Patient states she intends to take this medication untl she discusses with Dr. Marcelo Walker on 12-12-19. Yes Provider, Historical  
insulin glargine (LANTUS U-100 INSULIN) 100 unit/mL injection Inject 20 units daily 11/10/19  Yes Sushil Mccain MD  
hydrALAZINE (APRESOLINE) 100 mg tablet TAKE ONE TABLET BY MOUTH THREE TIMES A DAY 10/21/19  Yes Sushil Mccain MD  
sertraline (ZOLOFT) 50 mg tablet TAKE ONE AND ONE-HALF (1 & 1/2) TABLET BY MOUTH DAILY 8/22/19  Yes Sushil Mccain MD  
bumetanide (BUMEX) 1 mg tablet Take 2 mg by mouth daily. Yes Provider, Historical  
cloNIDine HCl (CATAPRES) 0.3 mg tablet Take 0.6 mg by mouth nightly. Yes Provider, Historical  
metoprolol succinate (TOPROL-XL) 100 mg tablet TAKE TWO TABLETS BY MOUTH DAILY 7/12/19  Yes Sushil Mccain MD  
doxazosin (CARDURA) 4 mg tablet TAKE ONE TABLET BY MOUTH ONCE NIGHTLY 6/14/19  Yes Sushil Mccain MD  
busPIRone (BUSPAR) 10 mg tablet TAKE ONE TABLET BY MOUTH TWICE A DAY 5/24/19  Yes Sushil Mccain MD  
cholecalciferol, VITAMIN D3, (VITAMIN D3) 5,000 unit tab tablet Take 5,000 Units by mouth daily. Yes Provider, Historical  
vitamin A 8,000 unit capsule Take 8,000 Units by mouth daily.    Yes Provider, Historical  
insulin lispro (HUMALOG) 100 unit/mL kwikpen 2 units with dinner for sugars over 200 1/3/14  Yes Marlo Aquino MD  
cyanocobalamin (VITAMIN B-12) 1,000 mcg sublingual tablet Take 1,000 mcg by mouth daily. Yes Provider, Historical  
metolazone (ZAROXOLYN) 5 mg tablet Take 1 Tab by mouth daily as needed (take if develop increased LE edema, weight gain > 5 pounds. ). 4/10/14   Theresa Paez NP Allergies Allergen Reactions  Ace Inhibitors Cough  Amlodipine Swelling  Avapro [Irbesartan] Unable to Obtain  Bactrim [Sulfamethoxazole-Trimethoprim] Other (comments) Sore mouth  Captopril Cough  Carvedilol Diarrhea [address]  Morphine Nausea and Vomiting and Swelling  Penicillins Hives  Pravachol [Pravastatin] Nausea Only  Seafood [Shellfish Containing Products] Hives  Singulair [Montelukast] Other (comments)  
  palpitations Family History Problem Relation Age of Onset  Cancer Mother  Heart Disease Mother  Alcohol abuse Father  Diabetes Brother  Hypertension Brother Social History Socioeconomic History  Marital status:  Spouse name: Not on file  Number of children: Not on file  Years of education: Not on file  Highest education level: Not on file Occupational History  Not on file Social Needs  Financial resource strain: Not on file  Food insecurity:  
  Worry: Not on file Inability: Not on file  Transportation needs:  
  Medical: Not on file Non-medical: Not on file Tobacco Use  Smoking status: Never Smoker  Smokeless tobacco: Never Used Substance and Sexual Activity  Alcohol use: Yes Comment: rare  Drug use: No  
 Sexual activity: Not on file Lifestyle  Physical activity:  
  Days per week: Not on file Minutes per session: Not on file  Stress: Not on file Relationships  Social connections:  
  Talks on phone: Not on file Gets together: Not on file Attends Cheondoism service: Not on file Active member of club or organization: Not on file Attends meetings of clubs or organizations: Not on file Relationship status: Not on file  Intimate partner violence:  
  Fear of current or ex partner: Not on file Emotionally abused: Not on file Physically abused: Not on file Forced sexual activity: Not on file Other Topics Concern  Not on file Social History Narrative  Not on file Vitals:  
 03/03/20 1529 BP: 146/65 Pulse: 60 Resp: 16 Temp: 97.6 °F (36.4 °C) Review of Systems: 
 
Gen: No fever, chills, malaise, weight loss/gain. Heent: No headache, rhinorrhea, epistaxis, ear pain, hearing loss, sinus pain, neck pain/stiffness, sore throat. Heart: No chest pain, palpitations, CHOW, pnd, or orthopnea. Resp: No cough, hemoptysis, wheezing and shortness of breath. GI: No nausea, vomiting, diarrhea, constipation, melena or hematochezia. : No urinary obstruction, dysuria or hematuria. Derm: see below Musc/skeletal: no bone or joint complains. Vasc: No edema, cyanosis or claudication. Endo: No heat/cold intolerance, no polyuria,polydipsia or polyphagia. Neuro: No unilateral weakness, numbness, tingling. No seizures. Heme: No easy bruising or bleeding. Wound Description: 
Refer to nursing notes for measurements. Ulcer Debridement Left plantar heel wound Character of Ulcer: improved Indication for Debridement: Slough, Exudate, Abnormal wound edge and Abnormal Wound base Pre debridement measurements: 2.1 x 1.2 x 0.4 cm Risks of procedure were discussed with patient. Consent has been signed. Anesthetic: Lidocaine 4% topical cream  
 
Level of Debridement: Subctaneous Tissue , Tissue and/or Material removed: Exudate, Fibrin/ Slough and Subcutaneous, Type of Tissue: Non- Viable Pre-debridement severity: Fat Layer Exposed Post debridement severity: Fat Layer exposed Instrument(s) used: Scalpel Bleeding controlled with: Pressure Estimated blood loss: Minimal 
 
Pre debridement measurements: 2.2 x 1.3 x 0.5 cm Post procedural pain: mild Patient tolerated procedure well. Right  plantar foot wound Character of Ulcer: improved Indication for Debridement: Slough, Exudate, Abnormal wound edge and Abnormal Wound base Pre debridement measurements: 0.9 cm x 0.9 cm x 0.3 cm Risks of procedure were discussed with patient. Consent has been signed. Anesthetic: Lidocaine 4% topical cream  
 
Level of Debridement: Subctaneous Tissue Tissue and/or Material removed: Exudate, Fibrin/ Slough and Subcutaneous Type of Tissue: Non- Viable Pre-debridement severity: Fat Layer Exposed Post debridement severity: Fat Layer exposed Instrument(s) used: Scalpel Bleeding controlled with: Pressure Estimated blood loss: Minimal 
 
Postdebridement measurements: 1.0 cm x 1.0 cm x 0.4 cm Post procedural pain: mild Patient tolerated procedure well. Left plantar midfoot wound Character of Ulcer: worsened Indication for Debridement: Slough, Exudate, Abnormal wound edge and Abnormal Wound base Pre debridement measurements: 3.6 cm x 4.2 cm x 0.1 cm Risks of procedure were discussed with patient. Consent has been signed. Anesthetic: Lidocaine 4% topical cream  
 
Level of Debridement: Subctaneous Tissue Tissue and/or Material removed: Exudate, Fibrin/ Slough and Subcutaneous Type of Tissue: Non- Viable Pre-debridement severity: Fat Layer Exposed Post debridement severity: muscle exposed Instrument(s) used: Scalpel, Curette Bleeding controlled with: Pressure, silver nitrate Estimated blood loss: Minimal 
 
Postdebridement measurements:3.7 cm x 4.3 cm x 1 cm, additionally there's an area of the wound at 10 o'clock that tunnels approximately 2 cm. Post procedural pain: mild Patient tolerated procedure well. Infection: none Edema: mild edema Lower Extremity Circulation Reviewed patient's recent ABIs. Shows decrease from when the  
 
Offloading: Offloading padding to shoes Assessment: 
 Diabetic plantar heel ulcer left foot Possible hematoma of left midfoot Diabetic ulcer right foot-plantar 5th met base Diabetic peripheral neuropathy B/l cavus foot deformity Plan:  
-Patient seen and evaluated as noted above. Reviewed ultrasound and left foot x-ray showing no fluid collection or osteomyelitis of the left foot. atient has superficial skin breakdown in area of left midfoot.   
-Discussed with patient that new midfoot wound that formed from TCC does probe deep and is in a high risk area. Stress the importance of wearing proper shoes and staying off the feet as this is a high risk area and an infection could lead to amputation. 
-Wound debridement performed of both feet. -Previous wounds of both feet are improved this week. -Endoform, hydrofera blue to both feet. -Follow-up in 1 week

## 2020-03-03 NOTE — WOUND CARE
03/03/20 1625 Wound Foot Plantar;Mid;Left arch of foot 02/18/20 Date First Assessed/Time First Assessed: 02/18/20 1516   Wound Approximate Age at First Assessment (Weeks): 1 weeks  Primary Wound Type: Friction  Location: Foot  Wound Location Orientation: Plantar;Mid;Left  Wound Description: arch of foot  Date of F. .. Dressing Type Applied Collagens/cell matrix;Foam;ABD pad;Gauze wrap (arnie); Special tape (comment) Wound Heel Left #1 12/17/19 Date First Assessed/Time First Assessed: 12/17/19 1529   Wound Approximate Age at First Assessment (Weeks): 5 weeks  Primary Wound Type: Diabetic Ulcer  Location: Heel  Wound Location Orientation: Left  Wound Description: #1  Date of First Observation. .. Dressing Type Applied Collagens/cell matrix; Foam  
Wound Foot Right;Plantar;Lateral #2 07/30/19 Date First Assessed/Time First Assessed: 07/30/19 1530   Wound Approximate Age at First Assessment (Weeks): 24 weeks  Primary Wound Type: Diabetic Ulcer  Location: Foot  Wound Location Orientation: Right;Plantar;Lateral  Wound Description: #2  Date of. .. Dressing Type Applied Collagens/cell matrix;Foam;Gauze;Gauze wrap (arnie); Special tape (comment)

## 2020-03-08 ENCOUNTER — HOSPITAL ENCOUNTER (INPATIENT)
Age: 60
LOS: 14 days | Discharge: HOME | DRG: 623 | End: 2020-03-22
Attending: EMERGENCY MEDICINE | Admitting: FAMILY MEDICINE
Payer: COMMERCIAL

## 2020-03-08 ENCOUNTER — APPOINTMENT (OUTPATIENT)
Dept: GENERAL RADIOLOGY | Age: 60
DRG: 623 | End: 2020-03-08
Attending: EMERGENCY MEDICINE
Payer: COMMERCIAL

## 2020-03-08 ENCOUNTER — APPOINTMENT (OUTPATIENT)
Dept: ULTRASOUND IMAGING | Age: 60
DRG: 623 | End: 2020-03-08
Attending: EMERGENCY MEDICINE
Payer: COMMERCIAL

## 2020-03-08 ENCOUNTER — APPOINTMENT (OUTPATIENT)
Dept: CT IMAGING | Age: 60
DRG: 623 | End: 2020-03-08
Attending: EMERGENCY MEDICINE
Payer: COMMERCIAL

## 2020-03-08 DIAGNOSIS — E08.621 DIABETIC ULCER OF LEFT MIDFOOT ASSOCIATED WITH DIABETES MELLITUS DUE TO UNDERLYING CONDITION, LIMITED TO BREAKDOWN OF SKIN (HCC): ICD-10-CM

## 2020-03-08 DIAGNOSIS — I48.91 ATRIAL FIBRILLATION WITH RVR (HCC): ICD-10-CM

## 2020-03-08 DIAGNOSIS — R41.82 ALTERED MENTAL STATUS, UNSPECIFIED ALTERED MENTAL STATUS TYPE: ICD-10-CM

## 2020-03-08 DIAGNOSIS — L03.116 LEFT LEG CELLULITIS: Primary | ICD-10-CM

## 2020-03-08 DIAGNOSIS — L97.421 DIABETIC ULCER OF LEFT MIDFOOT ASSOCIATED WITH DIABETES MELLITUS DUE TO UNDERLYING CONDITION, LIMITED TO BREAKDOWN OF SKIN (HCC): ICD-10-CM

## 2020-03-08 PROBLEM — R77.8 ELEVATED TROPONIN: Status: ACTIVE | Noted: 2020-03-08

## 2020-03-08 LAB
ALBUMIN SERPL-MCNC: 2.8 G/DL (ref 3.5–5)
ALBUMIN/GLOB SERPL: 0.6 {RATIO} (ref 1.1–2.2)
ALP SERPL-CCNC: 100 U/L (ref 45–117)
ALT SERPL-CCNC: 16 U/L (ref 12–78)
AMORPH CRY URNS QL MICRO: ABNORMAL
ANION GAP SERPL CALC-SCNC: 11 MMOL/L (ref 5–15)
APPEARANCE UR: ABNORMAL
APTT PPP: 34.2 SEC (ref 22.1–32)
AST SERPL-CCNC: 22 U/L (ref 15–37)
BACTERIA URNS QL MICRO: NEGATIVE /HPF
BASOPHILS # BLD: 0.1 K/UL (ref 0–0.1)
BASOPHILS NFR BLD: 1 % (ref 0–1)
BILIRUB SERPL-MCNC: 0.5 MG/DL (ref 0.2–1)
BILIRUB UR QL: NEGATIVE
BUN SERPL-MCNC: 30 MG/DL (ref 6–20)
BUN/CREAT SERPL: 14 (ref 12–20)
CALCIUM SERPL-MCNC: 8.8 MG/DL (ref 8.5–10.1)
CHLORIDE SERPL-SCNC: 108 MMOL/L (ref 97–108)
CK SERPL-CCNC: 69 U/L (ref 26–192)
CO2 SERPL-SCNC: 16 MMOL/L (ref 21–32)
COLOR UR: ABNORMAL
CREAT SERPL-MCNC: 2.17 MG/DL (ref 0.55–1.02)
CRP SERPL HS-MCNC: >9.5 MG/L
DIFFERENTIAL METHOD BLD: ABNORMAL
EOSINOPHIL # BLD: 0 K/UL (ref 0–0.4)
EOSINOPHIL NFR BLD: 0 % (ref 0–7)
EPITH CASTS URNS QL MICRO: ABNORMAL /LPF
ERYTHROCYTE [DISTWIDTH] IN BLOOD BY AUTOMATED COUNT: 15.8 % (ref 11.5–14.5)
ERYTHROCYTE [SEDIMENTATION RATE] IN BLOOD: 85 MM/HR (ref 0–30)
EST. AVERAGE GLUCOSE BLD GHB EST-MCNC: 169 MG/DL
GLOBULIN SER CALC-MCNC: 4.7 G/DL (ref 2–4)
GLUCOSE BLD STRIP.AUTO-MCNC: 171 MG/DL (ref 65–100)
GLUCOSE BLD STRIP.AUTO-MCNC: 228 MG/DL (ref 65–100)
GLUCOSE SERPL-MCNC: 231 MG/DL (ref 65–100)
GLUCOSE UR STRIP.AUTO-MCNC: 100 MG/DL
GRAN CASTS URNS QL MICRO: ABNORMAL /LPF
HBA1C MFR BLD: 7.5 % (ref 4–5.6)
HCT VFR BLD AUTO: 31.1 % (ref 35–47)
HGB BLD-MCNC: 9.8 G/DL (ref 11.5–16)
HGB UR QL STRIP: NEGATIVE
IMM GRANULOCYTES # BLD AUTO: 0.1 K/UL (ref 0–0.04)
IMM GRANULOCYTES NFR BLD AUTO: 1 % (ref 0–0.5)
INR PPP: 2.9 (ref 0.9–1.1)
KETONES UR QL STRIP.AUTO: 15 MG/DL
LACTATE SERPL-SCNC: 1.6 MMOL/L (ref 0.4–2)
LEUKOCYTE ESTERASE UR QL STRIP.AUTO: ABNORMAL
LYMPHOCYTES # BLD: 0.8 K/UL (ref 0.8–3.5)
LYMPHOCYTES NFR BLD: 5 % (ref 12–49)
MCH RBC QN AUTO: 29.2 PG (ref 26–34)
MCHC RBC AUTO-ENTMCNC: 31.5 G/DL (ref 30–36.5)
MCV RBC AUTO: 92.6 FL (ref 80–99)
MONOCYTES # BLD: 1 K/UL (ref 0–1)
MONOCYTES NFR BLD: 7 % (ref 5–13)
MUCOUS THREADS URNS QL MICRO: ABNORMAL /LPF
NEUTS SEG # BLD: 14 K/UL (ref 1.8–8)
NEUTS SEG NFR BLD: 86 % (ref 32–75)
NITRITE UR QL STRIP.AUTO: NEGATIVE
NRBC # BLD: 0 K/UL (ref 0–0.01)
NRBC BLD-RTO: 0 PER 100 WBC
PH UR STRIP: 5 [PH] (ref 5–8)
PLATELET # BLD AUTO: 290 K/UL (ref 150–400)
PMV BLD AUTO: 11 FL (ref 8.9–12.9)
POTASSIUM SERPL-SCNC: 4.5 MMOL/L (ref 3.5–5.1)
PROT SERPL-MCNC: 7.5 G/DL (ref 6.4–8.2)
PROT UR STRIP-MCNC: 100 MG/DL
PROTHROMBIN TIME: 28.1 SEC (ref 9–11.1)
RBC # BLD AUTO: 3.36 M/UL (ref 3.8–5.2)
RBC #/AREA URNS HPF: ABNORMAL /HPF (ref 0–5)
SERVICE CMNT-IMP: ABNORMAL
SERVICE CMNT-IMP: ABNORMAL
SODIUM SERPL-SCNC: 135 MMOL/L (ref 136–145)
SP GR UR REFRACTOMETRY: 1.02 (ref 1–1.03)
THERAPEUTIC RANGE,PTTT: ABNORMAL SECS (ref 58–77)
TROPONIN I SERPL-MCNC: 0.4 NG/ML
TROPONIN I SERPL-MCNC: 0.45 NG/ML
UA: UC IF INDICATED,UAUC: ABNORMAL
UROBILINOGEN UR QL STRIP.AUTO: 0.2 EU/DL (ref 0.2–1)
WBC # BLD AUTO: 16 K/UL (ref 3.6–11)
WBC URNS QL MICRO: ABNORMAL /HPF (ref 0–4)

## 2020-03-08 PROCEDURE — 74011636637 HC RX REV CODE- 636/637: Performed by: FAMILY MEDICINE

## 2020-03-08 PROCEDURE — 85610 PROTHROMBIN TIME: CPT

## 2020-03-08 PROCEDURE — 74011000250 HC RX REV CODE- 250: Performed by: EMERGENCY MEDICINE

## 2020-03-08 PROCEDURE — 65660000000 HC RM CCU STEPDOWN

## 2020-03-08 PROCEDURE — 81001 URINALYSIS AUTO W/SCOPE: CPT

## 2020-03-08 PROCEDURE — 85652 RBC SED RATE AUTOMATED: CPT

## 2020-03-08 PROCEDURE — 96375 TX/PRO/DX INJ NEW DRUG ADDON: CPT

## 2020-03-08 PROCEDURE — 85730 THROMBOPLASTIN TIME PARTIAL: CPT

## 2020-03-08 PROCEDURE — 83605 ASSAY OF LACTIC ACID: CPT

## 2020-03-08 PROCEDURE — 73630 X-RAY EXAM OF FOOT: CPT

## 2020-03-08 PROCEDURE — 80053 COMPREHEN METABOLIC PANEL: CPT

## 2020-03-08 PROCEDURE — 0QBP0ZX EXCISION OF LEFT METATARSAL, OPEN APPROACH, DIAGNOSTIC: ICD-10-PCS | Performed by: PODIATRIST

## 2020-03-08 PROCEDURE — 96365 THER/PROPH/DIAG IV INF INIT: CPT

## 2020-03-08 PROCEDURE — 82962 GLUCOSE BLOOD TEST: CPT

## 2020-03-08 PROCEDURE — 87040 BLOOD CULTURE FOR BACTERIA: CPT

## 2020-03-08 PROCEDURE — 86141 C-REACTIVE PROTEIN HS: CPT

## 2020-03-08 PROCEDURE — 74011000258 HC RX REV CODE- 258: Performed by: EMERGENCY MEDICINE

## 2020-03-08 PROCEDURE — 84484 ASSAY OF TROPONIN QUANT: CPT

## 2020-03-08 PROCEDURE — 85025 COMPLETE CBC W/AUTO DIFF WBC: CPT

## 2020-03-08 PROCEDURE — 74011250637 HC RX REV CODE- 250/637: Performed by: FAMILY MEDICINE

## 2020-03-08 PROCEDURE — 82550 ASSAY OF CK (CPK): CPT

## 2020-03-08 PROCEDURE — 71045 X-RAY EXAM CHEST 1 VIEW: CPT

## 2020-03-08 PROCEDURE — 96376 TX/PRO/DX INJ SAME DRUG ADON: CPT

## 2020-03-08 PROCEDURE — 93005 ELECTROCARDIOGRAM TRACING: CPT

## 2020-03-08 PROCEDURE — 83036 HEMOGLOBIN GLYCOSYLATED A1C: CPT

## 2020-03-08 PROCEDURE — 36415 COLL VENOUS BLD VENIPUNCTURE: CPT

## 2020-03-08 PROCEDURE — 96367 TX/PROPH/DG ADDL SEQ IV INF: CPT

## 2020-03-08 PROCEDURE — 74011250637 HC RX REV CODE- 250/637: Performed by: EMERGENCY MEDICINE

## 2020-03-08 PROCEDURE — 73700 CT LOWER EXTREMITY W/O DYE: CPT

## 2020-03-08 PROCEDURE — 93971 EXTREMITY STUDY: CPT

## 2020-03-08 PROCEDURE — 99285 EMERGENCY DEPT VISIT HI MDM: CPT

## 2020-03-08 PROCEDURE — 74011250636 HC RX REV CODE- 250/636: Performed by: EMERGENCY MEDICINE

## 2020-03-08 RX ORDER — SODIUM CHLORIDE 0.9 % (FLUSH) 0.9 %
5-40 SYRINGE (ML) INJECTION AS NEEDED
Status: DISCONTINUED | OUTPATIENT
Start: 2020-03-08 | End: 2020-03-22 | Stop reason: HOSPADM

## 2020-03-08 RX ORDER — INSULIN GLARGINE 100 [IU]/ML
20 INJECTION, SOLUTION SUBCUTANEOUS DAILY
Status: DISCONTINUED | OUTPATIENT
Start: 2020-03-09 | End: 2020-03-13

## 2020-03-08 RX ORDER — SERTRALINE HYDROCHLORIDE 50 MG/1
50 TABLET, FILM COATED ORAL DAILY
Status: DISCONTINUED | OUTPATIENT
Start: 2020-03-09 | End: 2020-03-22 | Stop reason: HOSPADM

## 2020-03-08 RX ORDER — WARFARIN 4 MG/1
4 TABLET ORAL 2 TIMES WEEKLY
COMMUNITY
End: 2020-03-26

## 2020-03-08 RX ORDER — LANOLIN ALCOHOL/MO/W.PET/CERES
1000 CREAM (GRAM) TOPICAL DAILY
Status: DISCONTINUED | OUTPATIENT
Start: 2020-03-09 | End: 2020-03-22 | Stop reason: HOSPADM

## 2020-03-08 RX ORDER — METOPROLOL SUCCINATE 50 MG/1
200 TABLET, EXTENDED RELEASE ORAL
Status: COMPLETED | OUTPATIENT
Start: 2020-03-08 | End: 2020-03-08

## 2020-03-08 RX ORDER — HYDROMORPHONE HYDROCHLORIDE 1 MG/ML
0.5 INJECTION, SOLUTION INTRAMUSCULAR; INTRAVENOUS; SUBCUTANEOUS
Status: DISCONTINUED | OUTPATIENT
Start: 2020-03-08 | End: 2020-03-10

## 2020-03-08 RX ORDER — MELATONIN
5000 DAILY
Status: DISCONTINUED | OUTPATIENT
Start: 2020-03-09 | End: 2020-03-22 | Stop reason: HOSPADM

## 2020-03-08 RX ORDER — METOPROLOL SUCCINATE 50 MG/1
100 TABLET, EXTENDED RELEASE ORAL DAILY
Status: DISCONTINUED | OUTPATIENT
Start: 2020-03-09 | End: 2020-03-22 | Stop reason: HOSPADM

## 2020-03-08 RX ORDER — WARFARIN 2 MG/1
4 TABLET ORAL EVERY EVENING
Status: DISCONTINUED | OUTPATIENT
Start: 2020-03-08 | End: 2020-03-08 | Stop reason: DRUGHIGH

## 2020-03-08 RX ORDER — WARFARIN 2 MG/1
2 TABLET ORAL
Status: DISCONTINUED | OUTPATIENT
Start: 2020-03-09 | End: 2020-03-09

## 2020-03-08 RX ORDER — DOXAZOSIN 2 MG/1
4 TABLET ORAL DAILY
Status: DISCONTINUED | OUTPATIENT
Start: 2020-03-09 | End: 2020-03-18 | Stop reason: SDUPTHER

## 2020-03-08 RX ORDER — BUMETANIDE 1 MG/1
2 TABLET ORAL DAILY
Status: DISCONTINUED | OUTPATIENT
Start: 2020-03-09 | End: 2020-03-09

## 2020-03-08 RX ORDER — POTASSIUM CHLORIDE 750 MG/1
10 TABLET, FILM COATED, EXTENDED RELEASE ORAL DAILY
Status: DISCONTINUED | OUTPATIENT
Start: 2020-03-09 | End: 2020-03-16

## 2020-03-08 RX ORDER — SODIUM CHLORIDE 0.9 % (FLUSH) 0.9 %
5-40 SYRINGE (ML) INJECTION EVERY 8 HOURS
Status: DISCONTINUED | OUTPATIENT
Start: 2020-03-08 | End: 2020-03-22 | Stop reason: HOSPADM

## 2020-03-08 RX ORDER — MAGNESIUM SULFATE 100 %
4 CRYSTALS MISCELLANEOUS AS NEEDED
Status: DISCONTINUED | OUTPATIENT
Start: 2020-03-08 | End: 2020-03-22 | Stop reason: HOSPADM

## 2020-03-08 RX ORDER — BUSPIRONE HYDROCHLORIDE 10 MG/1
10 TABLET ORAL DAILY
Status: DISCONTINUED | OUTPATIENT
Start: 2020-03-09 | End: 2020-03-22 | Stop reason: HOSPADM

## 2020-03-08 RX ORDER — FENTANYL CITRATE 50 UG/ML
50 INJECTION, SOLUTION INTRAMUSCULAR; INTRAVENOUS
Status: COMPLETED | OUTPATIENT
Start: 2020-03-08 | End: 2020-03-08

## 2020-03-08 RX ORDER — CLINDAMYCIN PHOSPHATE 600 MG/50ML
600 INJECTION INTRAVENOUS EVERY 8 HOURS
Status: DISCONTINUED | OUTPATIENT
Start: 2020-03-08 | End: 2020-03-08

## 2020-03-08 RX ORDER — DILTIAZEM HYDROCHLORIDE 5 MG/ML
20 INJECTION INTRAVENOUS ONCE
Status: COMPLETED | OUTPATIENT
Start: 2020-03-08 | End: 2020-03-08

## 2020-03-08 RX ORDER — WARFARIN 4 MG/1
2 TABLET ORAL
COMMUNITY
End: 2020-03-26 | Stop reason: DRUGHIGH

## 2020-03-08 RX ORDER — ONDANSETRON 2 MG/ML
4 INJECTION INTRAMUSCULAR; INTRAVENOUS
Status: COMPLETED | OUTPATIENT
Start: 2020-03-08 | End: 2020-03-08

## 2020-03-08 RX ORDER — NALOXONE HYDROCHLORIDE 0.4 MG/ML
0.4 INJECTION, SOLUTION INTRAMUSCULAR; INTRAVENOUS; SUBCUTANEOUS AS NEEDED
Status: DISCONTINUED | OUTPATIENT
Start: 2020-03-08 | End: 2020-03-22 | Stop reason: HOSPADM

## 2020-03-08 RX ORDER — SODIUM CHLORIDE 0.9 % (FLUSH) 0.9 %
5-10 SYRINGE (ML) INJECTION AS NEEDED
Status: DISCONTINUED | OUTPATIENT
Start: 2020-03-08 | End: 2020-03-19

## 2020-03-08 RX ORDER — ACETAMINOPHEN 325 MG/1
650 TABLET ORAL
Status: DISCONTINUED | OUTPATIENT
Start: 2020-03-08 | End: 2020-03-08 | Stop reason: SDUPTHER

## 2020-03-08 RX ORDER — CLONIDINE HYDROCHLORIDE 0.1 MG/1
0.6 TABLET ORAL
Status: DISCONTINUED | OUTPATIENT
Start: 2020-03-08 | End: 2020-03-09

## 2020-03-08 RX ORDER — WARFARIN 2 MG/1
4 TABLET ORAL
Status: DISCONTINUED | OUTPATIENT
Start: 2020-03-08 | End: 2020-03-09

## 2020-03-08 RX ORDER — DEXTROSE 50 % IN WATER (D50W) INTRAVENOUS SYRINGE
25-50 AS NEEDED
Status: DISCONTINUED | OUTPATIENT
Start: 2020-03-08 | End: 2020-03-22 | Stop reason: HOSPADM

## 2020-03-08 RX ORDER — HYDRALAZINE HYDROCHLORIDE 50 MG/1
100 TABLET, FILM COATED ORAL EVERY 8 HOURS
Status: DISCONTINUED | OUTPATIENT
Start: 2020-03-08 | End: 2020-03-18

## 2020-03-08 RX ORDER — ACETAMINOPHEN 325 MG/1
650 TABLET ORAL
Status: DISCONTINUED | OUTPATIENT
Start: 2020-03-08 | End: 2020-03-22 | Stop reason: HOSPADM

## 2020-03-08 RX ORDER — INSULIN LISPRO 100 [IU]/ML
INJECTION, SOLUTION INTRAVENOUS; SUBCUTANEOUS
Status: DISCONTINUED | OUTPATIENT
Start: 2020-03-08 | End: 2020-03-22 | Stop reason: HOSPADM

## 2020-03-08 RX ADMIN — FENTANYL CITRATE 50 MCG: 50 INJECTION, SOLUTION INTRAMUSCULAR; INTRAVENOUS at 11:20

## 2020-03-08 RX ADMIN — VANCOMYCIN HYDROCHLORIDE 2000 MG: 10 INJECTION, POWDER, LYOPHILIZED, FOR SOLUTION INTRAVENOUS at 12:20

## 2020-03-08 RX ADMIN — INSULIN LISPRO 3 UNITS: 100 INJECTION, SOLUTION INTRAVENOUS; SUBCUTANEOUS at 19:01

## 2020-03-08 RX ADMIN — DEXTROSE MONOHYDRATE 15 MG/HR: 50 INJECTION, SOLUTION INTRAVENOUS at 18:44

## 2020-03-08 RX ADMIN — CEFEPIME HYDROCHLORIDE 2 G: 2 INJECTION, POWDER, FOR SOLUTION INTRAVENOUS at 11:37

## 2020-03-08 RX ADMIN — Medication 10 ML: at 22:00

## 2020-03-08 RX ADMIN — DILTIAZEM HYDROCHLORIDE 20 MG: 5 INJECTION INTRAVENOUS at 11:20

## 2020-03-08 RX ADMIN — SODIUM CHLORIDE 1000 ML: 900 INJECTION, SOLUTION INTRAVENOUS at 11:20

## 2020-03-08 RX ADMIN — METOPROLOL SUCCINATE 200 MG: 50 TABLET, EXTENDED RELEASE ORAL at 14:46

## 2020-03-08 RX ADMIN — DEXTROSE MONOHYDRATE 5 MG/HR: 50 INJECTION, SOLUTION INTRAVENOUS at 12:09

## 2020-03-08 RX ADMIN — ONDANSETRON 4 MG: 2 INJECTION INTRAMUSCULAR; INTRAVENOUS at 11:20

## 2020-03-08 RX ADMIN — WARFARIN SODIUM 4 MG: 2 TABLET ORAL at 21:18

## 2020-03-08 NOTE — ED PROVIDER NOTES
EMERGENCY DEPARTMENT HISTORY AND PHYSICAL EXAM 
 
 
Date: 3/8/2020 Patient Name: Sara Valenzuela History of Presenting Illness Chief Complaint Patient presents with  Leg Swelling  
  leg pain, swelling, and redness x 24 hours; reports that prior to that she had been experiencing fever and chills; hx of DM and cellulitis, but to other leg History Provided By: Patient HPI: Sara Valenzuela, 60 y.o. female presents to the ED with history of acquired lymphedema, atrial fibrillation, bilateral breast cancer, chronic kidney disease, type 2 diabetes, hypertension and hyperlipidemia on Coumadin here with concern that she has cellulitis in her left leg. She relays a history of cellulitis in the right leg but noticed in the past 24 hours that she is developed redness that is come up the leg that is tender and warm. He also notes a new blister along the left lateral side of her foot that she says is new compared to her chronic ulcers on her foot. Is followed by Dr. Addis De Anda the foot and ankle surgeon and has ongoing wound care visits for that issue including a wound care follow-up visit on March 3. The March 3 note did make mention of a new hematoma at the left midfoot. The patient says this was present for a couple of days but seems to be worse and leaky. She presented with a high heart rate but denies any chest pain, shortness of breath or palpitations and says that she has not taken her oral atrial fibrillation medications for past 2 days. She denies any fever at home and says that it hurts to walk on that left foot. There are no other complaints, changes, or physical findings at this time. PCP: Katherine Manzo MD 
 
No current facility-administered medications on file prior to encounter. Current Outpatient Medications on File Prior to Encounter Medication Sig Dispense Refill  warfarin (COUMADIN) 4 mg tablet Take one and half tablets today (Feb 11), then 1 tablet on Sun and Wed and a half tablet the other 5 days. 30 Tab 5  
 acetaminophen 500 mg tab 500 mg, diphenhydrAMINE 25 mg cap 25 mg Take 2 Doses by mouth nightly.  potassium chloride SR (KLOR-CON 10) 10 mEq tablet Take 10 mEq by mouth daily. Indications: Patient states she intends to take this medication untl she discusses with Dr. Austyn Seals on 12-12-19.  insulin glargine (LANTUS U-100 INSULIN) 100 unit/mL injection Inject 20 units daily 10 mL 5  
 hydrALAZINE (APRESOLINE) 100 mg tablet TAKE ONE TABLET BY MOUTH THREE TIMES A DAY 90 Tab 11  
 sertraline (ZOLOFT) 50 mg tablet TAKE ONE AND ONE-HALF (1 & 1/2) TABLET BY MOUTH DAILY 45 Tab 5  
 bumetanide (BUMEX) 1 mg tablet Take 2 mg by mouth daily.  cloNIDine HCl (CATAPRES) 0.3 mg tablet Take 0.6 mg by mouth nightly.  metoprolol succinate (TOPROL-XL) 100 mg tablet TAKE TWO TABLETS BY MOUTH DAILY 60 Tab 9  
 doxazosin (CARDURA) 4 mg tablet TAKE ONE TABLET BY MOUTH ONCE NIGHTLY 30 Tab 10  
 busPIRone (BUSPAR) 10 mg tablet TAKE ONE TABLET BY MOUTH TWICE A DAY 60 Tab 10  
 metolazone (ZAROXOLYN) 5 mg tablet Take 1 Tab by mouth daily as needed (take if develop increased LE edema, weight gain > 5 pounds. ). 30 Tab 1  cholecalciferol, VITAMIN D3, (VITAMIN D3) 5,000 unit tab tablet Take 5,000 Units by mouth daily.  vitamin A 8,000 unit capsule Take 8,000 Units by mouth daily.  insulin lispro (HUMALOG) 100 unit/mL kwikpen 2 units with dinner for sugars over 200 1 Package 0  
 cyanocobalamin (VITAMIN B-12) 1,000 mcg sublingual tablet Take 1,000 mcg by mouth daily. Past History Past Medical History: 
Past Medical History:  
Diagnosis Date  Acquired lymphedema Right arm  Arrhythmia  Asthma  [facility]rial [facility] Dr. Bharathi Larson and Dr. Beth alves [facility])  Cancer ([facility] bilateral breast  
 Chronic kidney disease  Diabetes mellitus type II   
 Dizziness  Essential hypertension  Hyperlipidemia  Hypertension  Long term (current) use of anticoagulants  Morbid obesity ([facility] 3/14/2012  Osteoarthritis  Pacemaker  Sick sinus syndrome ([facility] Past Surgical History: 
Past Surgical History:  
Procedure Laterality Date  ABDOMEN SURGERY PROC UNLISTED    
 gastric bypass  GASTRIC BYPASS,OBESE<150CM SAW-EN-Y  7/08  
 hutcher  HX AFIB ABLATION    
 HX CARPAL TUNNEL RELEASE [address] [id number] [address] RIGHT MODIFIED RADICAL   
 HX MOHS PROCEDURES  1995  
 right  HX ORTHOPAEDIC    
 ight knee surgery  HX PACEMAKER    
 IR INSERT TUNL CVC W PORT OVER 5 YEARS    
 LAMINECTOMY,CERVICAL  1994  
 NEEDLE BIOPSY LIVER,W OTHR [address]  8.21.07  
 MS [address] AND 1994  MS TRABECULOPLASTY BY LASER SURGERY    
 US GUIDE ASP OVARIAN CYST ABD APPROACH  8.21.07  
 RIGHT Family History: 
Family History Problem Relation Age of Onset  Cancer Mother  Heart Disease Mother  Alcohol abuse Father  Diabetes Brother  Hypertension Brother Social History: 
Social History Tobacco Use  Smoking status: Never Smoker  Smokeless tobacco: Never Used Substance Use Topics  Alcohol use: Yes Comment: rare  Drug use: No  
 
 
Allergies: Allergies Allergen Reactions  Ace Inhibitors Cough  Amlodipine Swelling  Avapro [Irbesartan] Unable to Obtain  Bactrim [Sulfamethoxazole-Trimethoprim] Other (comments) Sore mouth  Captopril Cough  Carvedilol Diarrhea [address]  Morphine Nausea and Vomiting and Swelling  Penicillins Hives Tolerated cefepime 1/2020  Pravachol [Pravastatin] Nausea Only  Seafood [Shellfish Containing Products] Hives  Singulair [Montelukast] Other (comments)  
  palpitations Review of Systems Review of Systems Constitutional: Negative for activity change, appetite change, fatigue and fever. HENT: Negative. Respiratory: Negative. Cardiovascular: Negative. Gastrointestinal: Negative. Musculoskeletal: Negative for neck pain and neck stiffness. Skin: Positive for rash and wound. Neurological: Negative for dizziness and light-headedness. Hematological: Bruises/bleeds easily. Denies any bleeding, says she is compliant with her Coumadin. All other systems reviewed and are negative. Physical Exam  
Physical Exam  
Vital signs and nursing notes reviewed CONSTITUTIONAL: Alert, in mild distress; well-developed; well-nourished. Appears fatigued. HEAD:  Normocephalic, atraumatic EYES: PERRL; EOM's intact. ENTM: Nose: no rhinorrhea; Throat: no erythema or exudate, mucous membranes moist 
Neck:  Supple. trachea is midline. No JVD bilaterally. RESP: Chest clear, equal breath sounds. - W/R/R 
CV: S1 and S2 WNL; No murmurs, gallops or rubs. 2+ radial and DP pulses bilaterally. Regularly irregular tachycardia in the 140s. GI: non-distended, normal bowel sounds, abdomen soft and non-tender. No masses or organomegaly. : No costo-vertebral angle tenderness. BACK:  Non-tender, normal appearance UPPER EXT:  Normal inspection. no joint or soft tissue swelling LOWER EXT: 2+ edema present in the bilateral lower extremities with chronic hypertrophic skin changes of the bilateral lower extremities. Evident on the left lower extremity is a large hematoma along the left lateral foot with slight touching of the wound drains serous fluid. There is noted redness, tenderness and warmth that extends up the leg to the level of the mid thigh. There is no palpable subcu air. Distal PMS is intact NEURO: Alert and oriented x3, 5/5 strength and light touch sensation intact in bilateral upper and lower extremities. SKIN: No rashes; Warm and dry PSYCH: Normal mood, normal affect Diagnostic Study Results Labs - Recent Results (from the past 12 hour(s)) EKG, 12 LEAD, INITIAL Collection Time: 03/08/20  9:02 AM  
Result Value Ref Range Ventricular Rate 163 BPM  
 Atrial Rate 141 BPM  
 QRS Duration 122 ms  
 Q-T Interval 306 ms QTC Calculation (Bezet) 503 ms Calculated R Axis -42 degrees Calculated T Axis 125 degrees Diagnosis Undetermined rhythm Left axis deviation Septal infarct (cited on or before 09-APR-2014) Marked ST abnormality, possible inferolateral subendocardial injury When compared with ECG of 09-AUG-2019 15:33, 
Current undetermined rhythm precludes rhythm comparison, needs review ST now depressed in Inferior leads ST more depressed in Lateral leads CBC WITH AUTOMATED DIFF Collection Time: 03/08/20 10:25 AM  
Result Value Ref Range WBC 16.0 (H) 3.6 - 11.0 K/uL  
 RBC 3.36 (L) 3.80 - 5.20 M/uL HGB 9.8 (L) 11.5 - 16.0 g/dL HCT 31.1 (L) 35.0 - 47.0 % MCV 92.6 80.0 - 99.0 FL  
 MCH 29.2 26.0 - 34.0 PG  
 MCHC 31.5 30.0 - 36.5 g/dL  
 RDW 15.8 (H) 11.5 - 14.5 % PLATELET 392 529 - 859 K/uL MPV 11.0 8.9 - 12.9 FL  
 NRBC 0.0 0  WBC ABSOLUTE NRBC 0.00 0.00 - 0.01 K/uL NEUTROPHILS 86 (H) 32 - 75 % LYMPHOCYTES 5 (L) 12 - 49 % MONOCYTES 7 5 - 13 % EOSINOPHILS 0 0 - 7 % BASOPHILS 1 0 - 1 % IMMATURE GRANULOCYTES 1 (H) 0.0 - 0.5 % ABS. NEUTROPHILS 14.0 (H) 1.8 - 8.0 K/UL  
 ABS. LYMPHOCYTES 0.8 0.8 - 3.5 K/UL  
 ABS. MONOCYTES 1.0 0.0 - 1.0 K/UL  
 ABS. EOSINOPHILS 0.0 0.0 - 0.4 K/UL  
 ABS. BASOPHILS 0.1 0.0 - 0.1 K/UL  
 ABS. IMM. GRANS. 0.1 (H) 0.00 - 0.04 K/UL  
 DF AUTOMATED    
LACTIC ACID Collection Time: 03/08/20 10:25 AM  
Result Value Ref Range Lactic acid 1.6 0.4 - 2.0 MMOL/L  
SED RATE (ESR) Collection Time: 03/08/20 10:25 AM  
Result Value Ref Range Sed rate, automated 85 (H) 0 - 30 mm/hr CK W/ REFLX CKMB Collection Time: 03/08/20 11:25 AM  
Result Value Ref Range CK 69 26 - 413 U/L  
METABOLIC PANEL, COMPREHENSIVE Collection Time: 03/08/20 11:25 AM  
Result Value Ref Range Sodium 135 (L) 136 - 145 mmol/L Potassium 4.5 3.5 - 5.1 mmol/L Chloride 108 97 - 108 mmol/L  
 CO2 16 (L) 21 - 32 mmol/L Anion gap 11 5 - 15 mmol/L Glucose 231 (H) 65 - 100 mg/dL BUN 30 (H) 6 - 20 MG/DL Creatinine 2.17 (H) 0.55 - 1.02 MG/DL  
 BUN/Creatinine ratio 14 12 - 20 GFR est AA 28 (L) >60 ml/min/1.73m2 GFR est non-AA 23 (L) >60 ml/min/1.73m2 Calcium 8.8 8.5 - 10.1 MG/DL Bilirubin, total 0.5 0.2 - 1.0 MG/DL  
 ALT (SGPT) 16 12 - 78 U/L  
 AST (SGOT) 22 15 - 37 U/L Alk. phosphatase 100 45 - 117 U/L Protein, total 7.5 6.4 - 8.2 g/dL Albumin 2.8 (L) 3.5 - 5.0 g/dL Globulin 4.7 (H) 2.0 - 4.0 g/dL A-G Ratio 0.6 (L) 1.1 - 2.2    
TROPONIN I Collection Time: 03/08/20 11:25 AM  
Result Value Ref Range Troponin-I, Qt. 0.45 (H) <0.05 ng/mL PTT Collection Time: 03/08/20 11:27 AM  
Result Value Ref Range aPTT 34.2 (H) 22.1 - 32.0 sec  
 aPTT, therapeutic range     58.0 - 77.0 SECS  
PROTHROMBIN TIME + INR Collection Time: 03/08/20 11:27 AM  
Result Value Ref Range INR 2.9 (H) 0.9 - 1.1 Prothrombin time 28.1 (H) 9.0 - 11.1 sec Radiologic Studies -  
CT LOW EXT LT WO CONT No evidence of osteomyelitis, plantar subcutaneous emphysema XR FOOT LT MIN 3 V Final Result IMPRESSION: No signal change. Plantar soft tissue ulcers. No apparent  
osteomyelitis. XR CHEST PORT Final Result IMPRESSION: No Acute Disease. CXR Results  (Last 48 hours) 03/08/20 1119  XR CHEST PORT Final result Impression:  IMPRESSION: No Acute Disease. Narrative:  EXAM: Portable CXR. 1047 hours. INDICATION: meets SIRS criteria. Leg swelling and redness. Diabetes. FINDINGS:  
The lungs are clear. Heart is normal in size. There is no pulmonary edema. There is no evident pneumothorax, adenopathy or pleural effusion. Pacemaker is  
present. No significant change since 1/14/2020. Medical Decision Making I am the first provider for this patient. I reviewed the vital signs, available nursing notes, past medical history, past surgical history, family history and social history. Vital Signs-Reviewed the patient's vital signs. Patient Vitals for the past 12 hrs: 
 Temp Pulse Resp BP SpO2  
03/08/20 1445 98.7 °F (37.1 °C) (!) 136 19 148/48 99 % 03/08/20 1300  (!) 131 20 149/58 98 % 03/08/20 1209  (!) 135 19 (!) 164/21 98 % 03/08/20 1200  (!) 144 15 151/43 98 % 03/08/20 1025  (!) 151 19 172/78 100 % 03/08/20 0954 98.4 °F (36.9 °C) (!) 156 18 158/72 100 % EKG interpretation: (Preliminary) EKG performed at 9:03 AM, as interpreted by me shows an atrial fib rhythm with RVR at a rate of 163 with a left axis deviation with ST depressions present in the lateral precordial leads and 1 and aVL. The patient is without chest pain during the EKG. Records Reviewed: Nursing Notes, Old Medical Records, Previous electrocardiograms, Ambulance Run Sheet, Previous Radiology Studies and Previous Laboratory Studies Provider Notes (Medical Decision Making):  
50-year-old female with concern for new left leg cellulitis. Initial evaluation does raise concern for osteomyelitis for which patient needs evaluation for. Initial skin exam is not consistent with necrotizing fasciitis and reevaluation shows no extension and slight improvement of the cellulitis appearance with antibiotics here in the emergency department. I am concerned about the new wound along the left lateral foot, consultation placed for her foot and ankle surgeon. She does have a penicillin allergy, reviewed medications with pharmacist and have covered her with cefepime and Vanco.  Reassuring lactate.   Heart rate is elevated but likely secondary to patient not having her normal beta-blockade for her atrial for for the past 2 days and illness. Initial presentation is consistent with infection but not sepsis. Plan for admission. Still evident on CT. ED Course:  
Initial assessment performed. The patients presenting problems have been discussed, and they are in agreement with the care plan formulated and outlined with them. I have encouraged them to ask questions as they arise throughout their visit. 2:47 PM 
Spoke with Dr. Holley Pittman. Given Dr. Iza Jones group does not consult at [facility], he will see the patient for concerns related to the left lateral foot ulcer. Disposition: 
Admit Admit Note: 
2:12 PM 
Pt is being admitted by Dr. Tara Burroughs. The results of their tests and reason(s) for their admission have been discussed with pt and/or available family. They convey agreement and understanding for the need to be admitted and for admission diagnosis. PLAN: 
1. Current Discharge Medication List  
  
 
2. Follow-up Information None Return to ED if worse Diagnosis Clinical Impression: 1. Left leg cellulitis 2. Diabetic ulcer of left midfoot associated with diabetes mellitus due to underlying condition, limited to breakdown of skin ([address] 3. Atrial fibrillation with RVR ([address] Attestations: 
 
Wendy Leyva MD 
 
Please note that this dictation was completed with Palmap, the computer voice recognition software. Quite often unanticipated grammatical, syntax, homophones, and other interpretive errors are inadvertently transcribed by the computer software. Please disregard these errors. Please excuse any errors that have escaped final proofreading. Thank you.

## 2020-03-08 NOTE — ED NOTES
TRANSFER - OUT REPORT: 
 
Verbal report given to GURINDER Velazquez(name) on Jacoby Wetmore  being transferred to PCU(unit) for routine progression of care Report consisted of patients Situation, Background, Assessment and  
Recommendations(SBAR). Information from the following report(s) SBAR and ED Summary was reviewed with the receiving nurse. Lines:  
Peripheral IV 03/08/20 Left;Upper Arm (Active) Site Assessment Clean, dry, & intact 3/8/2020 10:33 AM  
Phlebitis Assessment 0 3/8/2020 10:33 AM  
Infiltration Assessment 0 3/8/2020 10:33 AM  
Dressing Status Clean, dry, & intact 3/8/2020 10:33 AM  
  
 
Opportunity for questions and clarification was provided. Patient transported with: 
 Monitor Registered Nurse

## 2020-03-08 NOTE — PROGRESS NOTES
Herbal or dietary supplement products [facility] does not stock herbal or dietary supplement products. The use of such is strongly discouraged due to the lack of regulated consistency of products. These products do not meet FDA or USP standards. I removed Vitamin A from the patient's [facility] Thanks Rajani Richter, PHARMD

## 2020-03-08 NOTE — PROGRESS NOTES
[phone]: TRANSFER - IN REPORT: 
 
Verbal report received from Bailey, [address] [place] [address] (name) on Jacoby Winchester  being received from ED (unit) for routine progression of care Report consisted of patients Situation, Background, Assessment and  
Recommendations(SBAR). Information from the following report(s) SBAR, Kardex, ED Summary, Intake/Output, MAR, Recent Results and Cardiac Rhythm Afib RVR was reviewed with the receiving nurse. Opportunity for questions and clarification was provided. 1837: Pt received to PCU. VS taken; MEWS score 1. Pt resting in bed, NAD. Primary Nurse Lieutenant Moyer and [address] , RN performed a dual skin assessment on this patient Impairment noted- see wound doc flow sheet Valdemar score is 18. 
 
1853: RN obtained new wristband for pt; old wristband not confirming on blood glucose meter. New wristband does not register either, MRN # confirmed with . Blood sugar 228. 
 
1925: Bedside and Verbal shift change report given to Monica Marrero RN (oncoming nurse) by Jose Frey RN (offgoing nurse). Report included the following information SBAR, Kardex, ED Summary, Intake/Output, MAR, Recent Results and Cardiac Rhythm Afib, rate controlled.

## 2020-03-08 NOTE — ED NOTES
Bedside and Verbal shift change report given to Herminia Gong RN (oncoming nurse) by this RN (offgoing nurse). Report included the following information SBAR.

## 2020-03-08 NOTE — PROGRESS NOTES
Pharmacy Automatic Renal Dosing Protocol - Antimicrobials Indication for Antimicrobials: skin and soft tissue infection - L leg; possible osteomyelitis, h/o chronic foot ulcers Current Regimen of Each Antimicrobial:  
Cefepime 2g IV x1 dose 3/8 in ED Vancomycin 2000 mg x1, then pharmacy to dose ; start 3/8; day 1 Previous Antimicrobial Therapy: N/a 
 
Goal Level: VANCOMYCIN TROUGH GOAL RANGE Vancomycin Trough: 15 - 20 mcg/mL  (AUC: 400 - 600 mg/hr/Liter/day) Date Dose & Interval Measured (mcg/mL) Extrapolated (mcg/mL) Date & time of next level: before 2nd maintenance dose Significant Cultures:  
3/8 blood cx pending Radiology / Imaging results: (X-ray, CT scan or MRI):  
3/8 L foot XR IMPRESSION: No signal change. Plantar soft tissue ulcers. No apparent osteomyelitis. 3/8 LLE CT (prelim) Unenhanced CT Left Foot show no bone erosion, periostitis or other radiographic 
features of overt osteomyelitis. There is plantar soft tissue emphysema. Paralysis, amputations, malnutrition: none noted Labs: 
Recent Labs 20 
1125 20 
1025 CREA 2.17*  --   
BUN 30*  --   
WBC  --  16.0* Temp (24hrs), Av.6 °F (37 °C), Min:98.4 °F (36.9 °C), Max:98.7 °F (37.1 °C) Creatinine Clearance (mL/min) or Dialysis: 28.8 mL/min (IBW) Impression/Plan:  
Scr elevated, but was 1.8-2.12 in 2020 Start vancomycin 1250 mg IV q24h after loading dose, anticipated trough 17.35 Cefepime only ordered x1 dose in ED (administered at 11:37), admission H&P is pending Antimicrobial stop date pending Pharmacy will follow daily and adjust medications as appropriate for renal function and/or serum levels. Thank you, Redia Claude, [facility]

## 2020-03-08 NOTE — H&P
Hospitalist Admission Note NAME: Debbi Manjarrez :  1960 MRN:  [id number] Date/Time:  3/8/2020 4:11 PM 
 
Patient PCP: Tonie Spatz, MD 
________________________________________________________________________ My assessment of this patient's clinical condition and my plan of care is as follows. Assessment / Plan: 
Atrial fibrillation/RVR 
- s/p Cardizem bolus  
- continue Cardizem gtt  
- resume metoprolol 
- Continue coumadin - therapeutic INR 2.9  
- Daily PT/INR  
- Cardiology consult Left leg cellulitis - Vancomycin and Cefepime  
- CT leg noted - Venous US LLE negative for DVT  
- Pain control prn 
- elevated limb as needed Elevated troponin - Likely 2/2 afib/rvr - trend Foot Ulcers - wound care - Podiatry consult Henri 
- consult Nephrology for recommendations HTN 
- resume home medications , hydralazine , clonidine , Toprol, Cardura DM 
- Lantus 20 units every day  
- ISS HLD Anxiety - Zoloft CHF 
- No exacerbation - On  Bumex SSS 
- Pacemaker Code Status: Full code Surrogate Decision Maker: DVT Prophylaxis: Coumadin GI Prophylaxis: not indicated Baseline: Home Subjective: CHIEF COMPLAINT: left leg swelling HISTORY OF PRESENT ILLNESS:    
Leonela Groves is a 60 y.o.  female who presents with left leg swelling onset yesterday . She claims that for the past 3 days she has been having fever and chills and yesterday she saw that her left leg was swollen and red. She also had associated pain. She has been following podiatry out patient for her foot ulcers. In the ER CT of her leg Unenhanced CT Left Foot show no bone erosion, periostitis or other radiographic features of overt osteomyelitis. There is plantar soft tissue emphysema. She was given Vancomycin. She was also in afib/rvr with hr > 150.  She received a bolus Cardizem and was started on Cardizem drip for rate control. She also was given po metoprolol. ER consulted Podiatry and Cardiology. We were asked to admit for work up and evaluation of the above problems. Past Medical History:  
Diagnosis Date  Acquired lymphedema Right arm  Arrhythmia  Asthma  Atrial fibrillation ([facility] Dr. Myrna Santiago and Dr. Kayla alves [facility])  Cancer ([facility] bilateral breast  
 Chronic kidney disease  Diabetes mellitus type II   
 Dizziness  Essential hypertension  Hyperlipidemia  Hypertension  Long term (current) use of anticoagulants  Morbid obesity ([facility] [address] [facility] 3/14/2012  Osteoarthritis  Pacemaker  Sick sinus syndrome ([facility] Past Surgical History:  
Procedure Laterality Date  ABDOMEN SURGERY PROC UNLISTED    
 gastric bypass  GASTRIC BYPASS,OBESE<150CM SAW-EN-Y  7/08  
 hutcher  HX AFIB ABLATION    
 HX CARPAL TUNNEL RELEASE [facility] [address] RIGHT MODIFIED RADICAL   
 HX MOHS PROCEDURES  1995  
 right  HX ORTHOPAEDIC    
 ight knee surgery  HX PACEMAKER    
 IR INSERT TUNL CVC W PORT OVER 5 YEARS    
 LAMINECTOMY,CERVICAL  1994  
 NEEDLE BIOPSY LIVER,W OTHR [address]  8.21.07  
 TN [address] AND 1994  TN TRABECULOPLASTY BY LASER SURGERY    
 US GUIDE ASP OVARIAN CYST ABD APPROACH  8.21.07  
 RIGHT Social History Tobacco Use  Smoking status: Never Smoker  Smokeless tobacco: Never Used Substance Use Topics  Alcohol use: Yes Comment: rare Family History Problem Relation Age of Onset  Cancer Mother  Heart Disease Mother  Alcohol abuse Father  Diabetes Brother  Hypertension Brother Allergies Allergen Reactions  Ace Inhibitors Cough  Amlodipine Swelling  Avapro [Irbesartan] Unable to Obtain  Bactrim [Sulfamethoxazole-Trimethoprim] Other (comments) Sore mouth  Captopril Cough  Carvedilol Diarrhea [address]  Morphine Nausea and Vomiting and Swelling  Penicillins Hives Tolerated cefepime 1/2020  Pravachol [Pravastatin] Nausea Only  Seafood [Shellfish Containing Products] Hives  Singulair [Montelukast] Other (comments)  
  palpitations Prior to Admission medications Medication Sig Start Date End Date Taking? Authorizing Provider  
warfarin (COUMADIN) 4 mg tablet Take one and half tablets today (Feb 11), then 1 tablet on Sun and Wed and a half tablet the other 5 days. 2/11/20   Forest Alba MD  
acetaminophen 500 mg tab 500 mg, diphenhydrAMINE 25 mg cap 25 mg Take 2 Doses by mouth nightly. Provider, Historical  
potassium chloride SR (KLOR-CON 10) 10 mEq tablet Take 10 mEq by mouth daily. Indications: Patient states she intends to take this medication untl she discusses with Dr. Hussain Hobson on 12-12-19. Provider, Historical  
insulin glargine (LANTUS U-100 INSULIN) 100 unit/mL injection Inject 20 units daily 11/10/19   Forest Alba MD  
hydrALAZINE (APRESOLINE) 100 mg tablet TAKE ONE TABLET BY MOUTH THREE TIMES A DAY 10/21/19   Forest Alba MD  
sertraline (ZOLOFT) 50 mg tablet TAKE ONE AND ONE-HALF (1 & 1/2) TABLET BY MOUTH DAILY 8/22/19   Forest Alba MD  
bumetanide (BUMEX) 1 mg tablet Take 2 mg by mouth daily. Provider, Historical  
cloNIDine HCl (CATAPRES) 0.3 mg tablet Take 0.6 mg by mouth nightly.     Provider, Historical  
metoprolol succinate (TOPROL-XL) 100 mg tablet TAKE TWO TABLETS BY MOUTH DAILY 7/12/19   Forest Alba MD  
doxazosin (CARDURA) 4 mg tablet TAKE ONE TABLET BY MOUTH ONCE NIGHTLY 6/14/19   Forest Alba MD  
busPIRone (BUSPAR) 10 mg tablet TAKE ONE TABLET BY MOUTH TWICE A DAY 5/24/19   Forest Alba MD  
metolazone (ZAROXOLYN) 5 mg tablet Take 1 Tab by mouth daily as needed (take if develop increased LE edema, weight gain > 5 pounds. ). 4/10/14   Canelo Palomo NP  
cholecalciferol, VITAMIN D3, (VITAMIN D3) 5,000 unit tab tablet Take 5,000 Units by mouth daily. Provider, Historical  
vitamin A 8,000 unit capsule Take 8,000 Units by mouth daily. Provider, Historical  
insulin lispro (HUMALOG) 100 unit/mL kwikpen 2 units with dinner for sugars over 200 1/3/14   Ghulam Mas MD  
cyanocobalamin (VITAMIN B-12) 1,000 mcg sublingual tablet Take 1,000 mcg by mouth daily. Provider, Historical  
 
 
REVIEW OF SYSTEMS:    
I am not able to complete the review of systems because: The patient is intubated and sedated The patient has altered mental status due to his acute medical problems The patient has baseline aphasia from prior stroke(s) The patient has baseline dementia and is not reliable historian The patient is in acute medical distress and unable to provide information Total of 12 systems reviewed as follows:   
   POSITIVE= underlined text  Negative = text not underlined General:  fever, chills, sweats, generalized weakness, weight loss/gain,  
   loss of appetite Eyes:    blurred vision, eye pain, loss of vision, double vision ENT:    rhinorrhea, pharyngitis Respiratory:   cough, sputum production, SOB, CHOW, wheezing, pleuritic pain  
Cardiology:   chest pain, palpitations, orthopnea, PND, edema, syncope Gastrointestinal:  abdominal pain , N/V, diarrhea, dysphagia, constipation, bleeding Genitourinary:  frequency, urgency, dysuria, hematuria, incontinence Muskuloskeletal :  arthralgia, myalgia, back pain Hematology:  easy bruising, nose or gum bleeding, lymphadenopathy Dermatological: rash, ulceration, pruritis, color change / jaundice Endocrine:   hot flashes or polydipsia Neurological:  headache, dizziness, confusion, focal weakness, paresthesia, Speech difficulties, memory loss, gait difficulty Psychological: Feelings of anxiety, depression, agitation Objective: VITALS:   
Visit Vitals /48 (BP 1 Location: Left arm, BP Patient Position: At rest) Pulse (!) 136 Temp 98.7 °F (37.1 °C) Resp 19 Ht 5' 9\" (1.753 m) Wt 116.6 kg (257 lb) SpO2 99% BMI 37.95 kg/m² PHYSICAL EXAM: 
 
General:    Alert, cooperative, no distress, appears stated age. HEENT: Atraumatic, anicteric sclerae, pink conjunctivae No oral ulcers, mucosa moist, throat clear, dentition fair Neck:  Supple, symmetrical,  thyroid: non tender Lungs:   Clear to auscultation bilaterally. No Wheezing or Rhonchi. No rales. Chest wall:  No tenderness  No Accessory muscle use. Heart:   Regular  rhythm,  No  murmur   No edema Abdomen:   Soft, non-tender. Not distended. Bowel sounds normal 
Extremities: No cyanosis. No clubbing Capillary refill normal,  Radial pulse 2+,  DP ++ BLE Skin:     Not pale. Not Jaundiced  No rashes Psych:  Good insight. Not depressed. Not anxious or agitated. Neurologic: EOMs intact. No facial asymmetry. No aphasia or slurred speech. Symmetrical strength, Sensation grossly intact. Alert and oriented X 4.  
 
_______________________________________________________________________ Care Plan discussed with: 
  Comments Patient x Family RN x Care Manager Consultant:     
_______________________________________________________________________ Expected  Disposition:  
Home with Family HH/PT/OT/RN x  
SNF/LTC   
RENE   
________________________________________________________________________ TOTAL TIME:  35 Minutes Critical Care Provided     Minutes non procedure based Comments >50% of visit spent in counseling and coordination of care  Chart review Discussion with patient and/or family and questions answered  
 
________________________________________________________________________ Adriane Heart, MD 
 
 Procedures: see electronic medical records for all procedures/Xrays and details which were not copied into this note but were reviewed prior to creation of Plan. LAB DATA REVIEWED:   
Recent Results (from the past 24 hour(s)) EKG, 12 LEAD, INITIAL Collection Time: 03/08/20  9:02 AM  
Result Value Ref Range Ventricular Rate 163 BPM  
 Atrial Rate 141 BPM  
 QRS Duration 122 ms  
 Q-T Interval 306 ms QTC Calculation (Bezet) 503 ms Calculated R Axis -42 degrees Calculated T Axis 125 degrees Diagnosis Undetermined rhythm Left axis deviation Septal infarct (cited on or before 09-APR-2014) Marked ST abnormality, possible inferolateral subendocardial injury When compared with ECG of 09-AUG-2019 15:33, 
Current undetermined rhythm precludes rhythm comparison, needs review ST now depressed in Inferior leads ST more depressed in Lateral leads CBC WITH AUTOMATED DIFF Collection Time: 03/08/20 10:25 AM  
Result Value Ref Range WBC 16.0 (H) 3.6 - 11.0 K/uL  
 RBC 3.36 (L) 3.80 - 5.20 M/uL HGB 9.8 (L) 11.5 - 16.0 g/dL HCT 31.1 (L) 35.0 - 47.0 % MCV 92.6 80.0 - 99.0 FL  
 MCH 29.2 26.0 - 34.0 PG  
 MCHC 31.5 30.0 - 36.5 g/dL  
 RDW 15.8 (H) 11.5 - 14.5 % PLATELET 965 524 - 523 K/uL MPV 11.0 8.9 - 12.9 FL  
 NRBC 0.0 0  WBC ABSOLUTE NRBC 0.00 0.00 - 0.01 K/uL NEUTROPHILS 86 (H) 32 - 75 % LYMPHOCYTES 5 (L) 12 - 49 % MONOCYTES 7 5 - 13 % EOSINOPHILS 0 0 - 7 % BASOPHILS 1 0 - 1 % IMMATURE GRANULOCYTES 1 (H) 0.0 - 0.5 % ABS. NEUTROPHILS 14.0 (H) 1.8 - 8.0 K/UL  
 ABS. LYMPHOCYTES 0.8 0.8 - 3.5 K/UL  
 ABS. MONOCYTES 1.0 0.0 - 1.0 K/UL  
 ABS. EOSINOPHILS 0.0 0.0 - 0.4 K/UL  
 ABS. BASOPHILS 0.1 0.0 - 0.1 K/UL  
 ABS. IMM. GRANS. 0.1 (H) 0.00 - 0.04 K/UL  
 DF AUTOMATED    
LACTIC ACID Collection Time: 03/08/20 10:25 AM  
Result Value Ref Range Lactic acid 1.6 0.4 - 2.0 MMOL/L  
SED RATE (ESR)  Collection Time: 03/08/20 10:25 AM  
 Result Value Ref Range Sed rate, automated 85 (H) 0 - 30 mm/hr CRP, HIGH SENSITIVITY Collection Time: 03/08/20 11:25 AM  
Result Value Ref Range CRP, High sensitivity >9.5 mg/L  
CK W/ REFLX CKMB Collection Time: 03/08/20 11:25 AM  
Result Value Ref Range CK 69 26 - 364 U/L  
METABOLIC PANEL, COMPREHENSIVE Collection Time: 03/08/20 11:25 AM  
Result Value Ref Range Sodium 135 (L) 136 - 145 mmol/L Potassium 4.5 3.5 - 5.1 mmol/L Chloride 108 97 - 108 mmol/L  
 CO2 16 (L) 21 - 32 mmol/L Anion gap 11 5 - 15 mmol/L Glucose 231 (H) 65 - 100 mg/dL BUN 30 (H) 6 - 20 MG/DL Creatinine 2.17 (H) 0.55 - 1.02 MG/DL  
 BUN/Creatinine ratio 14 12 - 20 GFR est AA 28 (L) >60 ml/min/1.73m2 GFR est non-AA 23 (L) >60 ml/min/1.73m2 Calcium 8.8 8.5 - 10.1 MG/DL Bilirubin, total 0.5 0.2 - 1.0 MG/DL  
 ALT (SGPT) 16 12 - 78 U/L  
 AST (SGOT) 22 15 - 37 U/L Alk. phosphatase 100 45 - 117 U/L Protein, total 7.5 6.4 - 8.2 g/dL Albumin 2.8 (L) 3.5 - 5.0 g/dL Globulin 4.7 (H) 2.0 - 4.0 g/dL A-G Ratio 0.6 (L) 1.1 - 2.2    
TROPONIN I Collection Time: 03/08/20 11:25 AM  
Result Value Ref Range Troponin-I, Qt. 0.45 (H) <0.05 ng/mL PTT Collection Time: 03/08/20 11:27 AM  
Result Value Ref Range aPTT 34.2 (H) 22.1 - 32.0 sec  
 aPTT, therapeutic range     58.0 - 77.0 SECS  
PROTHROMBIN TIME + INR Collection Time: 03/08/20 11:27 AM  
Result Value Ref Range INR 2.9 (H) 0.9 - 1.1 Prothrombin time 28.1 (H) 9.0 - 11.1 sec

## 2020-03-08 NOTE — ED NOTES
Assumed care of pt from [facility]. Pt resting comfortably with side rails up and call bell in reach.

## 2020-03-09 ENCOUNTER — APPOINTMENT (OUTPATIENT)
Dept: VASCULAR SURGERY | Age: 60
DRG: 623 | End: 2020-03-09
Attending: PODIATRIST
Payer: COMMERCIAL

## 2020-03-09 ENCOUNTER — ANESTHESIA EVENT (OUTPATIENT)
Dept: SURGERY | Age: 60
DRG: 623 | End: 2020-03-09
Payer: COMMERCIAL

## 2020-03-09 ENCOUNTER — APPOINTMENT (OUTPATIENT)
Dept: NON INVASIVE DIAGNOSTICS | Age: 60
DRG: 623 | End: 2020-03-09
Attending: INTERNAL MEDICINE
Payer: COMMERCIAL

## 2020-03-09 ENCOUNTER — APPOINTMENT (OUTPATIENT)
Dept: ULTRASOUND IMAGING | Age: 60
DRG: 623 | End: 2020-03-09
Attending: INTERNAL MEDICINE
Payer: COMMERCIAL

## 2020-03-09 LAB
ANION GAP SERPL CALC-SCNC: 9 MMOL/L (ref 5–15)
ATRIAL RATE: 441 BPM
AV VELOCITY RATIO: 0.44
BASOPHILS # BLD: 0.1 K/UL (ref 0–0.1)
BASOPHILS NFR BLD: 1 % (ref 0–1)
BUN SERPL-MCNC: 27 MG/DL (ref 6–20)
BUN/CREAT SERPL: 14 (ref 12–20)
CALCIUM SERPL-MCNC: 8.5 MG/DL (ref 8.5–10.1)
CALCULATED R AXIS, ECG10: -37 DEGREES
CALCULATED T AXIS, ECG11: 129 DEGREES
CHLORIDE SERPL-SCNC: 110 MMOL/L (ref 97–108)
CO2 SERPL-SCNC: 17 MMOL/L (ref 21–32)
CREAT SERPL-MCNC: 1.92 MG/DL (ref 0.55–1.02)
CREAT UR-MCNC: 45.7 MG/DL
DIAGNOSIS, 93000: NORMAL
DIFFERENTIAL METHOD BLD: ABNORMAL
ECHO AV AREA PEAK VELOCITY: 1.2 CM2
ECHO AV AREA/BSA PEAK VELOCITY: 0.5 CM2/M2
ECHO AV PEAK GRADIENT: 12.3 MMHG
ECHO AV PEAK VELOCITY: 175.21 CM/S
ECHO EST RA PRESSURE: 10 MMHG
ECHO LA AREA 4C: 22.3 CM2
ECHO LA MAJOR AXIS: 4.64 CM
ECHO LA VOL 2C: 55.4 ML (ref 22–52)
ECHO LA VOL 4C: 71.77 ML (ref 22–52)
ECHO LA VOL BP: 72.13 ML (ref 22–52)
ECHO LA VOL/BSA BIPLANE: 31.87 ML/M2 (ref 16–28)
ECHO LA VOLUME INDEX A2C: 24.48 ML/M2 (ref 16–28)
ECHO LA VOLUME INDEX A4C: 31.71 ML/M2 (ref 16–28)
ECHO LV EDV A4C: 90.5 ML
ECHO LV EDV INDEX A4C: 40 ML/M2
ECHO LV EDV TEICHHOLZ: 20.96 ML
ECHO LV EJECTION FRACTION A4C: 51 %
ECHO LV ESV A4C: 44.8 ML
ECHO LV ESV INDEX A4C: 19.8 ML/M2
ECHO LV ESV TEICHHOLZ: 11.44 ML
ECHO LV INTERNAL DIMENSION DIASTOLIC: 3.45 CM (ref 3.9–5.3)
ECHO LV INTERNAL DIMENSION SYSTOLIC: 2.69 CM
ECHO LV IVSD: 1.77 CM (ref 0.6–0.9)
ECHO LV MASS 2D: 291.8 G (ref 67–162)
ECHO LV MASS INDEX 2D: 128.9 G/M2 (ref 43–95)
ECHO LV POSTERIOR WALL DIASTOLIC: 1.73 CM (ref 0.6–0.9)
ECHO LVOT DIAM: 1.87 CM
ECHO LVOT PEAK GRADIENT: 2.3 MMHG
ECHO LVOT PEAK VELOCITY: 76.34 CM/S
ECHO MV REGURGITANT PEAK GRADIENT: 59.9 MMHG
ECHO MV REGURGITANT PEAK VELOCITY: 386.83 CM/S
ECHO PULMONARY ARTERY SYSTOLIC PRESSURE (PASP): 31 MMHG
ECHO RA AREA 4C: 15.26 CM2
ECHO RIGHT VENTRICULAR SYSTOLIC PRESSURE (RVSP): 31 MMHG
ECHO TV REGURGITANT MAX VELOCITY: 228.99 CM/S
ECHO TV REGURGITANT PEAK GRADIENT: 21 MMHG
EOSINOPHIL # BLD: 0 K/UL (ref 0–0.4)
EOSINOPHIL NFR BLD: 0 % (ref 0–7)
ERYTHROCYTE [DISTWIDTH] IN BLOOD BY AUTOMATED COUNT: 15.8 % (ref 11.5–14.5)
GLUCOSE BLD STRIP.AUTO-MCNC: 164 MG/DL (ref 65–100)
GLUCOSE BLD STRIP.AUTO-MCNC: 169 MG/DL (ref 65–100)
GLUCOSE BLD STRIP.AUTO-MCNC: 218 MG/DL (ref 65–100)
GLUCOSE BLD STRIP.AUTO-MCNC: 228 MG/DL (ref 65–100)
GLUCOSE SERPL-MCNC: 155 MG/DL (ref 65–100)
HCT VFR BLD AUTO: 28.7 % (ref 35–47)
HGB BLD-MCNC: 8.8 G/DL (ref 11.5–16)
IMM GRANULOCYTES # BLD AUTO: 0.1 K/UL (ref 0–0.04)
IMM GRANULOCYTES NFR BLD AUTO: 1 % (ref 0–0.5)
INR PPP: 3.7 (ref 0.9–1.1)
LVFS 2D: 21.98 %
LVSV (MOD SINGLE 4C): 19.52 ML
LVSV (TEICH): 9.53 ML
LYMPHOCYTES # BLD: 0.9 K/UL (ref 0.8–3.5)
LYMPHOCYTES NFR BLD: 8 % (ref 12–49)
MCH RBC QN AUTO: 28.3 PG (ref 26–34)
MCHC RBC AUTO-ENTMCNC: 30.7 G/DL (ref 30–36.5)
MCV RBC AUTO: 92.3 FL (ref 80–99)
MONOCYTES # BLD: 1.1 K/UL (ref 0–1)
MONOCYTES NFR BLD: 8 % (ref 5–13)
NEUTS SEG # BLD: 10.3 K/UL (ref 1.8–8)
NEUTS SEG NFR BLD: 82 % (ref 32–75)
NRBC # BLD: 0.03 K/UL (ref 0–0.01)
NRBC BLD-RTO: 0.2 PER 100 WBC
PLATELET # BLD AUTO: 286 K/UL (ref 150–400)
PMV BLD AUTO: 10.2 FL (ref 8.9–12.9)
POTASSIUM SERPL-SCNC: 4.3 MMOL/L (ref 3.5–5.1)
PROT UR-MCNC: 69 MG/DL (ref 0–11.9)
PROT/CREAT UR-RTO: 1.5
PROTHROMBIN TIME: 35.6 SEC (ref 9–11.1)
Q-T INTERVAL, ECG07: 304 MS
QRS DURATION, ECG06: 116 MS
QTC CALCULATION (BEZET), ECG08: 500 MS
RBC # BLD AUTO: 3.11 M/UL (ref 3.8–5.2)
SERVICE CMNT-IMP: ABNORMAL
SODIUM SERPL-SCNC: 136 MMOL/L (ref 136–145)
VENTRICULAR RATE, ECG03: 163 BPM
WBC # BLD AUTO: 12.5 K/UL (ref 3.6–11)

## 2020-03-09 PROCEDURE — 74011250637 HC RX REV CODE- 250/637: Performed by: NURSE PRACTITIONER

## 2020-03-09 PROCEDURE — 85610 PROTHROMBIN TIME: CPT

## 2020-03-09 PROCEDURE — 74011250636 HC RX REV CODE- 250/636: Performed by: EMERGENCY MEDICINE

## 2020-03-09 PROCEDURE — 74011250637 HC RX REV CODE- 250/637: Performed by: FAMILY MEDICINE

## 2020-03-09 PROCEDURE — 82962 GLUCOSE BLOOD TEST: CPT

## 2020-03-09 PROCEDURE — 65660000000 HC RM CCU STEPDOWN

## 2020-03-09 PROCEDURE — 76770 US EXAM ABDO BACK WALL COMP: CPT

## 2020-03-09 PROCEDURE — 93922 UPR/L XTREMITY ART 2 LEVELS: CPT

## 2020-03-09 PROCEDURE — 93306 TTE W/DOPPLER COMPLETE: CPT

## 2020-03-09 PROCEDURE — 36415 COLL VENOUS BLD VENIPUNCTURE: CPT

## 2020-03-09 PROCEDURE — 80048 BASIC METABOLIC PNL TOTAL CA: CPT

## 2020-03-09 PROCEDURE — 74011250637 HC RX REV CODE- 250/637: Performed by: INTERNAL MEDICINE

## 2020-03-09 PROCEDURE — 74011250636 HC RX REV CODE- 250/636: Performed by: FAMILY MEDICINE

## 2020-03-09 PROCEDURE — 74011250636 HC RX REV CODE- 250/636: Performed by: INTERNAL MEDICINE

## 2020-03-09 PROCEDURE — 74011000258 HC RX REV CODE- 258: Performed by: INTERNAL MEDICINE

## 2020-03-09 PROCEDURE — 85025 COMPLETE CBC W/AUTO DIFF WBC: CPT

## 2020-03-09 PROCEDURE — 74011636637 HC RX REV CODE- 636/637: Performed by: FAMILY MEDICINE

## 2020-03-09 PROCEDURE — 74011636637 HC RX REV CODE- 636/637: Performed by: INTERNAL MEDICINE

## 2020-03-09 PROCEDURE — 74011000250 HC RX REV CODE- 250: Performed by: FAMILY MEDICINE

## 2020-03-09 PROCEDURE — 84156 ASSAY OF PROTEIN URINE: CPT

## 2020-03-09 RX ORDER — INSULIN LISPRO 100 [IU]/ML
5 INJECTION, SOLUTION INTRAVENOUS; SUBCUTANEOUS
Status: DISCONTINUED | OUTPATIENT
Start: 2020-03-09 | End: 2020-03-12

## 2020-03-09 RX ORDER — BUMETANIDE 1 MG/1
1 TABLET ORAL DAILY
Status: DISCONTINUED | OUTPATIENT
Start: 2020-03-11 | End: 2020-03-10

## 2020-03-09 RX ORDER — DILTIAZEM HYDROCHLORIDE 30 MG/1
60 TABLET, FILM COATED ORAL
Status: DISCONTINUED | OUTPATIENT
Start: 2020-03-09 | End: 2020-03-22 | Stop reason: HOSPADM

## 2020-03-09 RX ORDER — SODIUM BICARBONATE 650 MG/1
650 TABLET ORAL 3 TIMES DAILY
Status: DISCONTINUED | OUTPATIENT
Start: 2020-03-09 | End: 2020-03-17

## 2020-03-09 RX ADMIN — CEFEPIME HYDROCHLORIDE 2 G: 2 INJECTION, POWDER, FOR SOLUTION INTRAVENOUS at 10:48

## 2020-03-09 RX ADMIN — PHYTONADIONE 2.5 MG: 10 INJECTION, EMULSION INTRAMUSCULAR; INTRAVENOUS; SUBCUTANEOUS at 18:12

## 2020-03-09 RX ADMIN — Medication 10 ML: at 06:00

## 2020-03-09 RX ADMIN — HYDRALAZINE HYDROCHLORIDE 100 MG: 50 TABLET, FILM COATED ORAL at 13:46

## 2020-03-09 RX ADMIN — BUSPIRONE HYDROCHLORIDE 10 MG: 10 TABLET ORAL at 08:34

## 2020-03-09 RX ADMIN — CEFEPIME HYDROCHLORIDE 2 G: 2 INJECTION, POWDER, FOR SOLUTION INTRAVENOUS at 22:02

## 2020-03-09 RX ADMIN — INSULIN LISPRO 5 UNITS: 100 INJECTION, SOLUTION INTRAVENOUS; SUBCUTANEOUS at 16:41

## 2020-03-09 RX ADMIN — SERTRALINE HYDROCHLORIDE 50 MG: 50 TABLET ORAL at 08:34

## 2020-03-09 RX ADMIN — DILTIAZEM HYDROCHLORIDE 60 MG: 60 TABLET, FILM COATED ORAL at 12:09

## 2020-03-09 RX ADMIN — HYDROMORPHONE HYDROCHLORIDE 0.5 MG: 1 INJECTION, SOLUTION INTRAMUSCULAR; INTRAVENOUS; SUBCUTANEOUS at 01:12

## 2020-03-09 RX ADMIN — HYDROMORPHONE HYDROCHLORIDE 0.5 MG: 1 INJECTION, SOLUTION INTRAMUSCULAR; INTRAVENOUS; SUBCUTANEOUS at 13:44

## 2020-03-09 RX ADMIN — INSULIN LISPRO 2 UNITS: 100 INJECTION, SOLUTION INTRAVENOUS; SUBCUTANEOUS at 12:09

## 2020-03-09 RX ADMIN — METOPROLOL SUCCINATE 100 MG: 50 TABLET, EXTENDED RELEASE ORAL at 08:34

## 2020-03-09 RX ADMIN — SODIUM BICARBONATE 650 MG: 650 TABLET ORAL at 16:41

## 2020-03-09 RX ADMIN — CYANOCOBALAMIN TAB 500 MCG 1000 MCG: 500 TAB at 08:34

## 2020-03-09 RX ADMIN — INSULIN GLARGINE 20 UNITS: 100 INJECTION, SOLUTION SUBCUTANEOUS at 08:39

## 2020-03-09 RX ADMIN — INSULIN LISPRO 3 UNITS: 100 INJECTION, SOLUTION INTRAVENOUS; SUBCUTANEOUS at 08:46

## 2020-03-09 RX ADMIN — BUMETANIDE 2 MG: 1 TABLET ORAL at 08:33

## 2020-03-09 RX ADMIN — INSULIN LISPRO 3 UNITS: 100 INJECTION, SOLUTION INTRAVENOUS; SUBCUTANEOUS at 16:42

## 2020-03-09 RX ADMIN — HYDRALAZINE HYDROCHLORIDE 100 MG: 50 TABLET, FILM COATED ORAL at 23:10

## 2020-03-09 RX ADMIN — DOXAZOSIN 4 MG: 2 TABLET ORAL at 08:34

## 2020-03-09 RX ADMIN — DILTIAZEM HYDROCHLORIDE 60 MG: 60 TABLET, FILM COATED ORAL at 16:41

## 2020-03-09 RX ADMIN — POTASSIUM CHLORIDE 10 MEQ: 750 TABLET, FILM COATED, EXTENDED RELEASE ORAL at 08:34

## 2020-03-09 RX ADMIN — DEXTROSE MONOHYDRATE 5 MG/HR: 50 INJECTION, SOLUTION INTRAVENOUS at 10:46

## 2020-03-09 RX ADMIN — MELATONIN 5 TABLET: at 08:46

## 2020-03-09 RX ADMIN — VANCOMYCIN HYDROCHLORIDE 1250 MG: 10 INJECTION, POWDER, LYOPHILIZED, FOR SOLUTION INTRAVENOUS at 12:36

## 2020-03-09 RX ADMIN — Medication 10 ML: at 13:45

## 2020-03-09 RX ADMIN — SODIUM BICARBONATE 650 MG: 650 TABLET ORAL at 22:02

## 2020-03-09 RX ADMIN — Medication 10 ML: at 22:02

## 2020-03-09 NOTE — CONSULTS
Podiatry Consult Subjective: Pt relates wound left foot for several months. Outpatient care has been debridement and local wound care. She began to feel ill a few days ago, and presented to the ED. Date of Consultation: March 9, 2020 Referring Physician: Dr. Darya Marte MD 
 
Patient is a 60 y.o.  female who is being seen for infected diabetic wound left foot. Patient Active Problem List  
 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 ([address] 03/03/2020  Traumatic hematoma of foot, left, initial encounter 02/25/2020  Type 2 diabetes mellitus with left diabetic foot infection ([address] 10/29/2019  Foot deformity, acquired, left 09/24/2019  Foot deformity, right 09/24/2019  Pes cavus 09/24/2019  Cellulitis of right lower extremity 08/09/2019  Diabetic ulcer of right midfoot associated with type 2 diabetes mellitus, with fat layer exposed ([address] 08/06/2019  Diabetic ulcer of left heel associated with type 2 diabetes mellitus, with fat layer exposed ([address] 06/21/2019  Open breast wound, left, initial encounter 06/07/2019  Venous stasis ulcer of right lower leg with edema of right lower leg (HCC) 11/14/2018  Diabetic polyneuropathy associated with type 2 diabetes mellitus ([address] 11/13/2018  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, associated with type 2 diabetes mellitus ([address] 04/25/2018  Morbid obesity with BMI of 40.0-44.9, adult ([address] 05/01/2017  Controlled type 2 diabetes mellitus with stage 4 chronic kidney disease, with long-term current use of insulin ([address] 01/16/2017  PAF (paroxysmal atrial fibrillation) ([address] 11/28/2016  Anemia in stage 4 chronic kidney disease ([facility] 11/28/2016  Essential hypertension 08/26/2016  Systolic murmur 47/14/3812  CKD (chronic kidney disease), stage IV ([facility] 06/09/2012  Mixed hyperlipidemia 05/22/2012  Long term (current) use of anticoagulants 04/11/2012  S/P cardiac pacemaker procedure 04/03/2012  Sick sinus syndrome ([facility] 04/02/2012  Status post ablation of atrial fibrillation 12/15/2011  Fe deficiency anemia 04/14/2010  
 History of breast cancer 04/13/2010  Asthma 04/13/2010 Past Medical History:  
Diagnosis Date  Acquired lymphedema Right arm  Arrhythmia  Asthma  Atrial fibrillation ([facility] Dr. Myrna Santiago and Dr. Kayla alves [facility])  Cancer ([facility] bilateral breast  
 Chronic kidney disease  Diabetes mellitus type II   
 Dizziness  Essential hypertension  Hyperlipidemia  Hypertension  Long term (current) use of anticoagulants  Morbid obesity ([facility] 3/14/2012  Osteoarthritis  Pacemaker  Sick sinus syndrome ([facility] Past Surgical History:  
Procedure Laterality Date  ABDOMEN SURGERY PROC UNLISTED    
 gastric bypass  GASTRIC BYPASS,OBESE<150CM SAW-EN-Y  7/08  
 [facility]  HX AFIB ABLATION    
 HX CARPAL TUNNEL RELEASE [address] [id number] [address] RIGHT MODIFIED RADICAL   
 HX MOHS PROCEDURES  1995  
 right  HX ORTHOPAEDIC    
 ight knee surgery  HX PACEMAKER    
 IR INSERT TUNL CVC W PORT OVER 5 YEARS    
 LAMINECTOMY,CERVICAL  1994  
 NEEDLE BIOPSY LIVER,W OTHR [address]  8.21.07  
 [address] AND 1994  OK TRABECULOPLASTY BY LASER SURGERY    
 US GUIDE ASP OVARIAN CYST ABD APPROACH  8.21.07  
 RIGHT Family History Problem Relation Age of Onset  Cancer Mother  Heart Disease Mother  Alcohol abuse Father  Diabetes Brother  Hypertension Brother Social History Tobacco Use  Smoking status: Never Smoker  Smokeless tobacco: Never Used Substance Use Topics  Alcohol use: Yes Comment: rare Current Facility-Administered Medications Medication Dose Route Frequency Provider Last Rate Last Dose  [START ON 3/10/2020] VANCOMYCIN INFORMATION NOTE   Other ONCE Brenda Frances MD      
 cefepime (MAXIPIME) 2 g in 0.9% sodium chloride (MBP/ADV) 100 mL  2 g IntraVENous Q12H Brenda Frances  mL/hr at 03/09/20 1048 2 g at 03/09/20 1048  dilTIAZem (CARDIZEM) IR tablet 60 mg  60 mg Oral TIDAC Tamar Stone, ANP   60 mg at 03/09/20 1209  
 sodium chloride (NS) flush 5-10 mL  5-10 mL IntraVENous PRN Maged Dacosta MD      
 vancomycin (VANCOCIN) 1,250 mg in 0.9% sodium chloride 250 mL IVPB  1,250 mg IntraVENous Q24H Sabina Santos  mL/hr at 03/09/20 1236 1,250 mg at 03/09/20 1236  bumetanide (BUMEX) tablet 2 mg  2 mg Oral DAILY Maged Dacosta MD   2 mg at 03/09/20 8464  busPIRone (BUSPAR) tablet 10 mg  10 mg Oral DAILY Maged Dacosta MD   10 mg at 03/09/20 8185  cholecalciferol (VITAMIN D3) (1000 Units /25 mcg) tablet 5 Tab  5,000 Units Oral DAILY Corrine Dean MD   5 Tab at 03/09/20 3644  cloNIDine HCL (CATAPRES) tablet 0.6 mg  0.6 mg Oral QHS Natalya Dacosta MD   Stopped at 03/08/20 2200  cyanocobalamin (VITAMIN B12) tablet 1,000 mcg  1,000 mcg Oral DAILY Maged Dacosta MD   1,000 mcg at 03/09/20 0834  
 doxazosin (CARDURA) tablet 4 mg  4 mg Oral DAILY Maged Dacosta MD   4 mg at 03/09/20 9861  hydrALAZINE (APRESOLINE) tablet 100 mg  100 mg Oral Q8H Maged Dacosta MD   100 mg at 03/09/20 1346  
 insulin glargine (LANTUS) injection 20 Units  20 Units SubCUTAneous DAILY Maged Dacosta MD   20 Units at 03/09/20 0839  
 metoprolol succinate (TOPROL-XL) XL tablet 100 mg  100 mg Oral DAILY Jossy Velez MD   100 mg at 03/09/20 9320  potassium chloride SR (KLOR-CON 10) tablet 10 mEq  10 mEq Oral DAILY Maged Dacosta MD   10 mEq at 03/09/20 2404  sertraline (ZOLOFT) tablet 50 mg  50 mg Oral DAILY Maged Dacosta MD   50 mg at 03/09/20 8344  sodium chloride (NS) flush 5-40 mL  5-40 mL IntraVENous Q8H Maged Dacosta MD   10 mL at 03/09/20 1345  sodium chloride (NS) flush 5-40 mL  5-40 mL IntraVENous PRN Hue Dacosta MD      
 acetaminophen (TYLENOL) tablet 650 mg  650 mg Oral Q4H PRN aMged Dacosta MD      
 HYDROmorphone (PF) (DILAUDID) injection 0.5 mg  0.5 mg IntraVENous Q4H PRN Maged Dacosta MD   0.5 mg at 03/09/20 1344  
 naloxone (NARCAN) injection 0.4 mg  0.4 mg IntraVENous PRN Maged Dacosta MD      
 glucose chewable tablet 16 g  4 Tab Oral PRN Maged Dacosta MD      
 dextrose (D50W) injection syrg 12.5-25 g  25-50 mL IntraVENous PRN Maged Dacosta MD      
 glucagon (GLUCAGEN) injection 1 mg  1 mg IntraMUSCular PRN Maged Dacosta MD      
 insulin lispro (HUMALOG) injection   SubCUTAneous AC&HS Jossy Velez MD   2 Units at 03/09/20 1209  warfarin (COUMADIN) tablet 4 mg  4 mg Oral Once per day on Sun Wed Hue Dacosta MD   4 mg at 03/08/20 2118  warfarin (COUMADIN) tablet 2 mg  2 mg Oral Once per day on Mon Tue Thu Fri Sat Jossy Velez MD      
  
Allergies Allergen Reactions  Ace Inhibitors Cough  Amlodipine Swelling  Avapro [Irbesartan] Unable to Obtain  Bactrim [Sulfamethoxazole-Trimethoprim] Other (comments) Sore mouth  Captopril Cough  Carvedilol Diarrhea [address]  Morphine Nausea and Vomiting and Swelling  Penicillins Hives Tolerated cefepime 1/2020  Pravachol [Pravastatin] Nausea Only  Seafood [Shellfish Containing Products] Hives  Singulair [Montelukast] Other (comments)  
  palpitations Review of Systems:  AO x3. NAD. Objective:  
 
Patient Vitals for the past 8 hrs: 
 BP Temp Pulse Resp SpO2 Height Weight 20 1349 134/61  91  98 %    
20 1209 129/55 98.6 °F (37 °C) 92 18 98 %    
20 1055 182/65     5' 9\" (1.753 m) 112.5 kg (248 lb) 20 0833 182/65  94      
20 0800   94      
20 0715 173/75 98.5 °F (36.9 °C) 87 18 98 %   Temp (24hrs), Av.9 °F (37.2 °C), Min:98.5 °F (36.9 °C), Max:99.8 °F (37.7 °C) Physical Exam:   
There is erythema, edema and calor left foot. There is a 5 cm diameter necrotic ulceration plantar left foot near the 5th metatarsal base, and a large draining bulla on the lateral left 5th metatarsal base. There is a 1 cm diameter clean wound to sub-Q on the planar right 5th metatarsal base. DP and PT 2/4; CFT less than 3 seconds Sensation intact to light touch Varus forefoot with enlargement of the right 5th metatarsal base, unable to assess fine structure details of left foot due to edema. X-rays left foot: No bony destruction. CT left foot: Small amount of air plantar foot at site of ulceration. No bone destruction or periostitis to suggest osteomyelitis. Lab Review:  
Recent Results (from the past 24 hour(s)) URINALYSIS W/ REFLEX CULTURE Collection Time: 20  5:00 PM  
Result Value Ref Range Color YELLOW/STRAW Appearance CLOUDY (A) CLEAR Specific gravity 1.019 1.003 - 1.030    
 pH (UA) 5.0 5.0 - 8.0 Protein 100 (A) NEG mg/dL Glucose 100 (A) NEG mg/dL Ketone 15 (A) NEG mg/dL Bilirubin NEGATIVE  NEG Blood NEGATIVE  NEG Urobilinogen 0.2 0.2 - 1.0 EU/dL Nitrites NEGATIVE  NEG Leukocyte Esterase SMALL (A) NEG    
 WBC 0-4 0 - 4 /hpf  
 RBC 0-5 0 - 5 /hpf Epithelial cells FEW FEW /lpf  Bacteria NEGATIVE  NEG /hpf  
 UA: UC IF INDICATED CULTURE NOT INDICATED BY UA RESULT CNI Mucus 1+ (A) NEG /lpf Amorphous Crystals 2+ (A) NEG  
 Granular cast 2-5 (A) NEG /lpf  
GLUCOSE, POC Collection Time: 03/08/20  6:53 PM  
Result Value Ref Range Glucose (POC) 228 (H) 65 - 100 mg/dL Performed by Quang Hercules HEMOGLOBIN A1C WITH EAG Collection Time: 03/08/20  7:09 PM  
Result Value Ref Range Hemoglobin A1c 7.5 (H) 4.0 - 5.6 % Est. average glucose 169 mg/dL TROPONIN I Collection Time: 03/08/20  7:09 PM  
Result Value Ref Range Troponin-I, Qt. 0.40 (H) <0.05 ng/mL GLUCOSE, POC Collection Time: 03/08/20  9:56 PM  
Result Value Ref Range Glucose (POC) 171 (H) 65 - 100 mg/dL Performed by Samia Stephens RN   
METABOLIC PANEL, BASIC Collection Time: 03/09/20  4:30 AM  
Result Value Ref Range Sodium 136 136 - 145 mmol/L Potassium 4.3 3.5 - 5.1 mmol/L Chloride 110 (H) 97 - 108 mmol/L  
 CO2 17 (L) 21 - 32 mmol/L Anion gap 9 5 - 15 mmol/L Glucose 155 (H) 65 - 100 mg/dL BUN 27 (H) 6 - 20 MG/DL Creatinine 1.92 (H) 0.55 - 1.02 MG/DL  
 BUN/Creatinine ratio 14 12 - 20 GFR est AA 32 (L) >60 ml/min/1.73m2 GFR est non-AA 27 (L) >60 ml/min/1.73m2 Calcium 8.5 8.5 - 10.1 MG/DL PROTHROMBIN TIME + INR Collection Time: 03/09/20  4:30 AM  
Result Value Ref Range INR 3.7 (H) 0.9 - 1.1 Prothrombin time 35.6 (H) 9.0 - 11.1 sec CBC WITH AUTOMATED DIFF Collection Time: 03/09/20  4:30 AM  
Result Value Ref Range WBC 12.5 (H) 3.6 - 11.0 K/uL  
 RBC 3.11 (L) 3.80 - 5.20 M/uL HGB 8.8 (L) 11.5 - 16.0 g/dL HCT 28.7 (L) 35.0 - 47.0 % MCV 92.3 80.0 - 99.0 FL  
 MCH 28.3 26.0 - 34.0 PG  
 MCHC 30.7 30.0 - 36.5 g/dL  
 RDW 15.8 (H) 11.5 - 14.5 % PLATELET 663 196 - 874 K/uL MPV 10.2 8.9 - 12.9 FL  
 NRBC 0.2 (H) 0  WBC ABSOLUTE NRBC 0.03 (H) 0.00 - 0.01 K/uL NEUTROPHILS 82 (H) 32 - 75 % LYMPHOCYTES 8 (L) 12 - 49 % MONOCYTES 8 5 - 13 % EOSINOPHILS 0 0 - 7 % BASOPHILS 1 0 - 1 % IMMATURE GRANULOCYTES 1 (H) 0.0 - 0.5 % ABS. NEUTROPHILS 10.3 (H) 1.8 - 8.0 K/UL  
 ABS. LYMPHOCYTES 0.9 0.8 - 3.5 K/UL  
 ABS. MONOCYTES 1.1 (H) 0.0 - 1.0 K/UL  
 ABS. EOSINOPHILS 0.0 0.0 - 0.4 K/UL  
 ABS. BASOPHILS 0.1 0.0 - 0.1 K/UL  
 ABS. IMM. GRANS. 0.1 (H) 0.00 - 0.04 K/UL  
 DF AUTOMATED    
GLUCOSE, POC Collection Time: 03/09/20  8:38 AM  
Result Value Ref Range Glucose (POC) 228 (H) 65 - 100 mg/dL Performed by OSCAR OSUNA RN   
GLUCOSE, POC Collection Time: 03/09/20 12:03 PM  
Result Value Ref Range Glucose (POC) 169 (H) 65 - 100 mg/dL Performed by OSCAR OSUNA RN   
ECHO ADULT COMPLETE Collection Time: 03/09/20 12:05 PM  
Result Value Ref Range LA Volume 72.13 22 - 52 mL Right Atrial Area 4C 15.26 cm2 Aortic Valve Systolic Peak Velocity 704.65 cm/s Aortic Valve Area by Continuity of Peak Velocity 1.2 cm2 AoV PG 12.3 mmHg LVIDd 3.45 (A) 3.9 - 5.3 cm  
 LVPWd 1.73 (A) 0.6 - 0.9 cm LVIDs 2.69 cm IVSd 1.77 (A) 0.6 - 0.9 cm  
 LV ES Vol A4C 44.8 mL  
 LVOT d 1.87 cm  
 LVOT Peak Velocity 76.34 cm/s LVOT Peak Gradient 2.3 mmHg LV Ejection Fraction MOD 4C 51 % LA Vol 4C 71.77 (A) 22 - 52 mL  
 LA Vol 2C 55.40 (A) 22 - 52 mL  
 LA Area 4C 22.3 cm2 LV Mass .8 (A) 67 - 162 g  
 LV Mass AL Index 104.7 (A) 43 - 95 g/m2 RVSP 31.0 mmHg LV ED Vol A4C 90.5 mL Est. RA Pressure 10.0 mmHg Mitral Regurgitant Peak Velocity 386.83 cm/s Left Atrium Major Axis 4.64 cm Triscuspid Valve Regurgitation Peak Gradient 21.0 mmHg  
 TR Max Velocity 228.99 cm/s  
 LA Vol Index 31.87 16 - 28 ml/m2 PASP 31.0 mmHg LA Vol Index 24.48 16 - 28 ml/m2 LA Vol Index 31.71 16 - 28 ml/m2 LVED Vol Index A4C 40.0 mL/m2 LVES Vol Index A4C 19.8 mL/m2 MR Peak Gradient 59.9 mmHg JASON/BSA Pk Jeff 0.5 cm2/m2 Left Ventricular Fractional Shortening by 2D 15.902439830 %  AV Velocity Ratio 0.44   
 Left Ventricular End Diastolic Volume by Teichholz Method 68.893055448 mL Left Ventricular End Systolic Volume by Teichholz Method 19.520805013 mL Left Ventricular Stroke Volume by Teichholz Method 3.5928107205 mL Left Ventricular Stroke Volume by 2-D Single Plane- MOD 94.642736155 mL Impression: 1. Ulceration left foot 2. Diabetic foot infection left with bulla or phlegmon formation 3. Ulcer to sub-Q right foot, no signs of infection. Recommendation: Wound care with silvasorb gel daily right foot. Left foot needs surgical debridement; patient says that she is \"hesitant\" about surgery. I explained that debridement, possibly including bone, is essential for resolution of the infection, but there is still a risk of some level of amputation, but no amputation would be performed at this first Sx. Patient then agreed with Sx plan. I will check with the OR regarding available time.

## 2020-03-09 NOTE — PROGRESS NOTES
Hospitalist Progress Note NAME: Flip Hinkle :  1960 MRN:  [id number] Assessment / Plan: 
Atrial fibrillation/RVR 
- s/p Cardizem bolus and remains on Cardizem gtt with good rate control -- now transitioning to oral diltiazem 
- Continue metoprolol 
- Continue coumadin; pharmacy consult for dosing - Now with supratherapeutic INR of 3.7 -- hold warfarin tonight - Cardiology consult reviewed and appreciated  
  
Left leg cellulitis and ulceration of the left foot 
- Continue with empiric Vancomycin and Cefepime - Blood cultures with no growth to date - CT leg noted - Seen in consultation by Dr. Rosalba Zaman with tentative plan for surgery tomorrow (will need to discuss INR with him) - Venous US LLE negative for DVT  
- Pain control prn - Elevated limb as needed Addendum: spoke with Dr. Rosalba Zaman, will give vitamin K today, goal INR 1.5-2 for surgery. 
  
Elevated troponin - Likely due to demand with rapid atrial fibrillation - Downtrending  
  
CKD4 - Appears to be stable at baseline creatinine - Renally dose medications  
  
HTN 
- Continue home medications: hydralazine, clonidine,Toprol, Cardura   
  
DM - Lantus 20 units every day  
- ISS 
- Diabetes management recommendations noted -- added Lispro 5 units QAC per recommendations 
  
HLD 
  
Anxiety  
- Continue Zoloft 
  
CHF 
- No exacerbation - On  Bumex  
  
SSS 
- Pacemaker Morbid obesity due to BMI >35 with DM, HTN / Body mass index is 36.62 kg/m². Code status: Full Prophylaxis: anticoagulated with warfarin Recommended Disposition: Home w/Family Subjective: Chief Complaint / Reason for Physician Visit: follow up rapid atrial fibrillation and cellulitis, foot ulcers Feeling okay this morning except for pain in her left leg and foot. No chest pain, palpitations, shortness of breath. No nausea or vomiting. Review of Systems: 
Symptom Y/N Comments  Symptom Y/N Comments Fever/Chills n   Chest Pain n   
 Poor Appetite    Edema y Cough n   Abdominal Pain n   
Sputum    Joint Pain SOB/CHOW n   Pruritis/Rash Nausea/vomit n   Tolerating PT/OT Diarrhea n   Tolerating Diet Constipation n   Other Could NOT obtain due to:   
 
Objective: VITALS:  
Last 24hrs VS reviewed since prior progress note. Most recent are: 
Patient Vitals for the past 24 hrs: 
 Temp Pulse Resp BP SpO2  
03/09/20 1349  91  134/61 98 % 03/09/20 1209 98.6 °F (37 °C) 92 18 129/55 98 % 03/09/20 1055    182/65   
03/09/20 0833  94  182/65   
03/09/20 0800  94     
03/09/20 0715 98.5 °F (36.9 °C) 87 18 173/75 98 % 03/09/20 0200 98.7 °F (37.1 °C) 84 18 155/85 94 % 03/08/20 2231 98.5 °F (36.9 °C) 76 20 146/47 97 % 03/08/20 2044  (!) 102  (!) 73/56   
03/08/20 2000 99.8 °F (37.7 °C) 75 22 121/60 100 % 03/08/20 1930    123/86   
03/08/20 1837 99.5 °F (37.5 °C) 91 15 144/79 99 % 03/08/20 1800  76 18 154/46 99 % 03/08/20 1730  74 19 (!) 139/38 98 % 03/08/20 1700  89 16 156/55 100 % 03/08/20 1630  78 19 144/43 98 % Intake/Output Summary (Last 24 hours) at 3/9/2020 1449 Last data filed at 3/9/2020 1443 Gross per 24 hour Intake 1026.13 ml Output 1050 ml Net -23.87 ml PHYSICAL EXAM: 
General: WD, WN. Alert, cooperative, no acute distress   
EENT:  EOMI. Anicteric sclerae. MMM Resp:  CTA bilaterally, no wheezing or rales. No accessory muscle use CV:  Irregularly irregular  rhythm, +LLE edema 1-2+ GI:  Soft, Non distended, Non tender.  +Bowel sounds Neurologic:  Alert and oriented X 3, normal speech, Psych:   Good insight. Not anxious nor agitated Skin:  No jaundice. Erythema of distal left lower extremity and foot, with heat and tenderness. Reviewed most current lab test results and cultures  YES Reviewed most current radiology test results   YES Review and summation of old records today    NO Reviewed patient's current orders and MAR    YES 
 PMH/SH reviewed - no change compared to H&P 
________________________________________________________________________ Care Plan discussed with: 
  Comments Patient x Family RN Care Manager Consultant     
                 x Multidiciplinary team rounds were held today with , nursing, pharmacist and clinical coordinator. Patient's plan of care was discussed; medications were reviewed and discharge planning was addressed. ________________________________________________________________________ Total NON critical care TIME:  25   Minutes Total CRITICAL CARE TIME Spent:   Minutes non procedure based Comments >50% of visit spent in counseling and coordination of care    
________________________________________________________________________ Gil Ramirez MD  
 
Procedures: see electronic medical records for all procedures/Xrays and details which were not copied into this note but were reviewed prior to creation of Plan. LABS: 
I reviewed today's most current labs and imaging studies. Pertinent labs include: 
Recent Labs 03/09/20 0430 03/08/20 
1025 WBC 12.5* 16.0* HGB 8.8* 9.8* HCT 28.7* 31.1*  
 290 Recent Labs 03/09/20 
0430 03/08/20 
1127 03/08/20 
1125   --  135* K 4.3  --  4.5  
*  --  108 CO2 17*  --  16* *  --  231* BUN 27*  --  30* CREA 1.92*  --  2.17* CA 8.5  --  8.8 ALB  --   --  2.8* TBILI  --   --  0.5 SGOT  --   --  22 ALT  --   --  16 INR 3.7* 2.9*  --   
 
 
Signed: Gil Ramirez MD

## 2020-03-09 NOTE — PROGRESS NOTES
Pharmacy Dosing of Warfarin Indication: a fib Goal INR: 2-3 PTA Warfarin Dose: 4 mg on Sun, Wed and 2 mg on all other days Concurrent anticoagulants: none Concurrent antiplatelet: none Major Interacting Medications Drugs that may increase INR: none Drugs that may decrease INR: none Conditions that may increase/decrease INR (CHF, C. diff, cirrhosis, thyroid disorder, hypoalbuminemia):  
 
Labs: 
Recent Labs 03/09/20 
0430 03/08/20 
1127 03/08/20 
1125 03/08/20 
1025 INR 3.7* 2.9*  --   --   
HGB 8.8*  --   --  9.8*   --   --  290 SGOT  --   --  22  --   
TBILI  --   --  0.5  --   
ALB  --   --  2.8*  -- Impression/Plan:   
Pharmacy consulted to dose warfarin on 3/9 Hold tonight's Warfarin since the INR is supratherapeutic and trending up INR daily, CBC w/o diff QOD Thanks RICO AyalaD 
 
[email or web address]

## 2020-03-09 NOTE — DIABETES MGMT
[address] Presentation Vani Solorio is a 60 y.o. female admitted with left leg cellulitis. She has a PMH of afib; pacemaker/Breast cancer/CKD/DM2/ HTN/ obesity (gastric bypass in 2008). A1C: 7.5. Consulted by Provider for advanced diabetes nursing assessment and care, specifically related to  
[] Transitioning off Mer[facility] Bless [x] Inpatient management strategy [x] Home management assessment 
[] Survival skill education Diabetes-related medical history Acute complications None Neurological complications Peripheral neuropathy Microvascular disease Nephropathy Macrovascular disease Peripheral vascular disease and Foot wounds Other associated conditions HTN/obesity Diabetes medication history Drug class Currently in use Discontinued Never used Biguanide DDP-4 inhibitor Sulfonylurea Thiazolidinedione GLP-1 RA SGLT-2 inhibitors Basal insulin Lantus 20units daily Bolus insulin Humalog 2units for BG >200 Subjective I visitied with MsLeanne Pa this morning. She has had a long history of diabetes Type 2. She states she 'got rid of it' back when she had her gastric bypass. She was close to 700lbs before the surgery. She states now it's back. She states she has had cellulitis before in her right leg. Patient reports the following home diabetes self-care practices: 
Eating pattern- eats small meals Physical activity pattern-none Monitoring pattern 
 
[x] Dinner  Check BG once daily before dinner; usually runs 100-120s. Taking medications pattern 
[x] Consistent administration 
[x] Affordable Social determinants of health impacting diabetes self-management practices None voiced Objective Physical exam 
General Alert, oriented and in no acute distress. Conversant and cooperative. Vital Signs Visit Vitals /65 Pulse 94 Temp 98.5 °F (36.9 °C) Resp 18 Ht 5' 9\" (1.753 m) Wt 112.5 kg (248 lb) SpO2 98% BMI 36.62 kg/m² [place] Skin  Warm and dry. Heart   Regular rate and rhythm. No murmurs, rubs or gallops Lungs  Clear without rales or rhonchi Extremities Diabetic foot exam: Bilateral lower extremity swelling noted with discoloration of skin Left Foot Visual Exam: amputation- 2nd toe from osteomylities and ulcer- left mariaa to thigh (redness only noted up to below knee) Pulse DP: unable to obtain due to dressing; foot warm Filament test: absent sensation Right Foot Visual Exam: ulcer- right foot; wrapped with kerlex; no obvious ulcer noted/verbalized Pulse DP: unable to obtain due to dressing; foot warm Filament test: absent sensation DP & PT pulses +2. Laboratory Lab Results Component Value Date/Time Hemoglobin A1c 7.5 (H) 03/08/2020 07:09 PM  
 Hemoglobin A1c (POC) 7.2 07/10/2019 03:14 PM  
 
Lab Results Component Value Date/Time LDL, calculated 82 11/11/2019 08:47 AM  
 
Lab Results Component Value Date/Time Creatinine (POC) 1.6 (H) 09/09/2011 08:34 AM  
 Creatinine 1.92 (H) 03/09/2020 04:30 AM  
 
Lab Results Component Value Date/Time Sodium 136 03/09/2020 04:30 AM  
 Potassium 4.3 03/09/2020 04:30 AM  
 Chloride 110 (H) 03/09/2020 04:30 AM  
 CO2 17 (L) 03/09/2020 04:30 AM  
 Anion gap 9 03/09/2020 04:30 AM  
 Glucose 155 (H) 03/09/2020 04:30 AM  
 BUN 27 (H) 03/09/2020 04:30 AM  
 Creatinine 1.92 (H) 03/09/2020 04:30 AM  
 BUN/Creatinine ratio 14 03/09/2020 04:30 AM  
 GFR est AA 32 (L) 03/09/2020 04:30 AM  
 GFR est non-AA 27 (L) 03/09/2020 04:30 AM  
 Calcium 8.5 03/09/2020 04:30 AM  
 Bilirubin, total 0.5 03/08/2020 11:25 AM  
 AST (SGOT) 22 03/08/2020 11:25 AM  
 Alk.  phosphatase 100 03/08/2020 11:25 AM  
 Protein, total 7.5 03/08/2020 11:25 AM  
 Albumin 2.8 (L) 03/08/2020 11:25 AM  
 Globulin 4.7 (H) 03/08/2020 11:25 AM  
 A-G Ratio 0.6 (L) 03/08/2020 11:25 AM  
 ALT (SGPT) 16 03/08/2020 11:25 AM  
 
Lab Results Component Value Date/Time ALT (SGPT) 16 03/08/2020 11:25 AM  
 
 
Blood glucose pattern Evaluation This woman, with Type 2 diabetes,hasn't achieved inpatient blood glucose target of 100-180mg/dl. Inpatient blood glucose management has been impacted by 
[x] Kidney dysfunction 
[x] Erratic meal consumption-only on clear liquids 
[] Glucocorticoid use [x]  _infection__________________ She currently she states does not have much of an appetite- 
Recommendations CONTINUE Basal insulin  
[x] 0.2 units/kg/D= approx. 20 units; could go up to 22 units if BG consistently stays >200. IF eating meals- ADD mealtime Bolus insulin (if consuming >50% carbohydrates) [x] Normal sensitivity=5 units Humalog Corrective insulin 
[x] Normal sensitivity Assessment and Plan Nursing Diagnosis Risk for unstable blood glucose pattern Nursing Intervention Domain 0404 Decision-making Support Nursing Interventions Examined current inpatient diabetes control Explored factors facilitating and impeding inpatient management Identified self-management practices impeding diabetes control Explored corrective strategies with patient and responsible inpatient provider Informed patient of rational for insulin strategy while hospitalized Referral  
[] Behavioral health services 
[] Inpatient nutrition services [] Pharmacy services for medication management 
[] Diabetes Self-Management Training through [facility] (Phone [phone] to schedule appointment) Billing Code(s) [x] 25286 IP subsequent hospital care - 45 minutes DARRYL Morrissey [facility] [phone]

## 2020-03-09 NOTE — PROGRESS NOTES
CM attempted to complete initial assessment. Pt using the restroom. CM will return at later time. Angelo Mas, Care Manager [phone]

## 2020-03-09 NOTE — CONSULTS
[address]  [phone] Date of  Admission: 3/8/2020  9:58 AM  
 
Admission type:Emergency Consult for: Atrial fibrillation with RVR ([address] [I48.91] Left leg cellulitis [Q17.804] Elevated troponin. Consult by: Dr Maria Alejandra Parisi, NP Subjective:  
 
Tasha Villegas is a 60 y.o. female admitted for Atrial fibrillation with RVR ([address] [I48.91] Left leg cellulitis [L03.116]. Patient complains of  Recent leg cellulitis, with new tenderness, warmd that blister on her other foot. She has been not feeling well and did not take her medications x 2 days as she was in bed. Denies any cardiac complaints. Previous treatment/evaluation includes left leg duplex,  Cardiac risk factors: diabetes mellitus, obesity, sedentary life style, hypertension, post-menopausal. 
 
Patient Active Problem List  
 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 ([address] 03/03/2020  Traumatic hematoma of foot, left, initial encounter 02/25/2020  Type 2 diabetes mellitus with left diabetic foot infection ([address] 10/29/2019  Foot deformity, acquired, left 09/24/2019  Foot deformity, right 09/24/2019  Pes cavus 09/24/2019  Cellulitis of right lower extremity 08/09/2019  Diabetic ulcer of right midfoot associated with type 2 diabetes mellitus, with fat layer exposed ([address] 08/06/2019  Diabetic ulcer of left heel associated with type 2 diabetes mellitus, with fat layer exposed ([address] 06/21/2019  Open breast wound, left, initial encounter 06/07/2019  Venous stasis ulcer of right lower leg with edema of right lower leg ([facility]) 11/14/2018  Diabetic polyneuropathy associated with type 2 diabetes mellitus ([address] 11/13/2018  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 04/25/2018  Morbid obesity with BMI of 40.0-44.9, adult ([facility] 05/01/2017  Controlled type 2 diabetes mellitus with stage 4 chronic kidney disease, with long-term current use of insulin ([address] 01/16/2017  PAF (paroxysmal atrial fibrillation) ([facility] 11/28/2016  Anemia in stage 4 chronic kidney disease ([address] 11/28/2016  Essential hypertension 08/26/2016  Systolic murmur 87/78/9974  CKD (chronic kidney disease), stage IV ([address] 06/09/2012  Mixed hyperlipidemia 05/22/2012  Long term (current) use of anticoagulants 04/11/2012  S/P cardiac pacemaker procedure 04/03/2012  Sick sinus syndrome ([address] 04/02/2012  Status post ablation of atrial fibrillation 12/15/2011  Fe deficiency anemia 04/14/2010  
 History of breast cancer 04/13/2010  Asthma 04/13/2010 Chyna Brooks MD 
Past Medical History:  
Diagnosis Date  Acquired lymphedema Right arm  Arrhythmia  Asthma  Atrial fibrillation ([facility] Dr. Mau Alicea and Dr. Anthony Macdonald [facility])  Cancer ([facility] bilateral breast  
 Chronic kidney disease  Diabetes mellitus type II   
 Dizziness  Essential hypertension  Hyperlipidemia  Hypertension  Long term (current) use of anticoagulants  Morbid obesity ([facility] 3/14/2012  Osteoarthritis  Pacemaker  Sick sinus syndrome ([facility] Social History Socioeconomic History  Marital status:  Spouse name: Not on file  Number of children: Not on file  Years of education: Not on file  Highest education level: Not on file Tobacco Use  Smoking status: Never Smoker  Smokeless tobacco: Never Used Substance and Sexual Activity  Alcohol use: Yes Comment: rare  Drug use: No  
 
Allergies Allergen Reactions  Ace Inhibitors Cough  Amlodipine Swelling  Avapro [Irbesartan] Unable to Obtain  Bactrim [Sulfamethoxazole-Trimethoprim] Other (comments) Sore mouth  Captopril Cough  Carvedilol Diarrhea [address]  Morphine Nausea and Vomiting and Swelling  Penicillins Hives Tolerated cefepime 1/2020  Pravachol [Pravastatin] Nausea Only  Seafood [Shellfish Containing Products] Hives  Singulair [Montelukast] Other (comments)  
  palpitations Family History Problem Relation Age of Onset  Cancer Mother  Heart Disease Mother  Alcohol abuse Father  Diabetes Brother  Hypertension Brother Current Facility-Administered Medications Medication Dose Route Frequency  sodium chloride (NS) flush 5-10 mL  5-10 mL IntraVENous PRN  
 dilTIAZem (CARDIZEM) 100 mg in dextrose 5% (MBP/ADV) 100 mL infusion  5-15 mg/hr IntraVENous TITRATE  vancomycin (VANCOCIN) 1,250 mg in 0.9% sodium chloride 250 mL IVPB  1,250 mg IntraVENous Q24H  
 bumetanide (BUMEX) tablet 2 mg  2 mg Oral DAILY  busPIRone (BUSPAR) tablet 10 mg  10 mg Oral DAILY  cholecalciferol (VITAMIN D3) (1000 Units /25 mcg) tablet 5 Tab  5,000 Units Oral DAILY  cloNIDine HCL (CATAPRES) tablet 0.6 mg  0.6 mg Oral QHS  cyanocobalamin (VITAMIN B12) tablet 1,000 mcg  1,000 mcg Oral DAILY  doxazosin (CARDURA) tablet 4 mg  4 mg Oral DAILY  hydrALAZINE (APRESOLINE) tablet 100 mg  100 mg Oral Q8H  
 insulin glargine (LANTUS) injection 20 Units  20 Units SubCUTAneous DAILY  metoprolol succinate (TOPROL-XL) XL tablet 100 mg  100 mg Oral DAILY  potassium chloride SR (KLOR-CON 10) tablet 10 mEq  10 mEq Oral DAILY  sertraline (ZOLOFT) tablet 50 mg  50 mg Oral DAILY  sodium chloride (NS) flush 5-40 mL  5-40 mL IntraVENous Q8H  
 sodium chloride (NS) flush 5-40 mL  5-40 mL IntraVENous PRN  
 acetaminophen (TYLENOL) tablet 650 mg  650 mg Oral Q4H PRN  
  HYDROmorphone (PF) (DILAUDID) injection 0.5 mg  0.5 mg IntraVENous Q4H PRN  
 naloxone (NARCAN) injection 0.4 mg  0.4 mg IntraVENous PRN  
 cefepime (MAXIPIME) 2 g in 0.9% sodium chloride (MBP/ADV) 100 mL  2 g IntraVENous Q24H  
 glucose chewable tablet 16 g  4 Tab Oral PRN  
 dextrose (D50W) injection syrg 12.5-25 g  25-50 mL IntraVENous PRN  
 glucagon (GLUCAGEN) injection 1 mg  1 mg IntraMUSCular PRN  
 insulin lispro (HUMALOG) injection   SubCUTAneous AC&HS  warfarin (COUMADIN) tablet 4 mg  4 mg Oral Once per day on Sun Wed  warfarin (COUMADIN) tablet 2 mg  2 mg Oral Once per day on Mon Tue Thu Fri Sat Review of Symptoms: 
Constitutional: negative Eyes: negative Ears, nose, mouth, throat, and face: negative Respiratory: negative Cardiovascular: negative Gastrointestinal: negative Genitourinary:negative Musculoskeletal: bilat leg wounds, erythema Neurological: negative Endocrine: negative Subjective:  
  
Visit Vitals /65 Pulse 94 Temp 98.5 °F (36.9 °C) Resp 18 Ht 5' 9\" (1.753 m) Wt 248 lb (112.5 kg) SpO2 98% BMI 36.62 kg/m² Physical: 
 
General: WD, WN. Alert, cooperative, no acute distress Heart: irreg, irreg, S1 WNL and S2 WNL. No S3 or S4, no m/S3/JVD, no carotid bruits Lungs: clear Abdomen: Soft, +BS, NTND Extremities: lower legs with edema, wrapped bilat Neurologic: Grossly normal 
 
Data Review:  
Recent Labs 03/09/20 
0430 03/08/20 
1025 WBC 12.5* 16.0* HGB 8.8* 9.8* HCT 28.7* 31.1*  
 290 Recent Labs 03/09/20 
0430 03/08/20 
1127 03/08/20 
1125   --  135* K 4.3  --  4.5  
*  --  108 CO2 17*  --  16* *  --  231* BUN 27*  --  30* CREA 1.92*  --  2.17* CA 8.5  --  8.8 ALB  --   --  2.8* TBILI  --   --  0.5 SGOT  --   --  22 ALT  --   --  16 INR 3.7* 2.9*  --   
 
 
Recent Labs 03/08/20 
1909 03/08/20 
1125 TROIQ 0.40* 0.45* Intake/Output Summary (Last 24 hours) at 3/9/2020 7024 Last data filed at 3/9/2020 8960 Gross per 24 hour Intake 387.13 ml Output 1050 ml Net -662.87 ml  
  
 
Cardiographics Telemetry: rate controlled AF 
 
 
ECG:  
 bpm.  
 
Echocardiogram: 2016 Left ventricle: Systolic function was normal. Ejection fraction was 
estimated in the range of 55 % to 60 %. There were no regional wall motion 
abnormalities. Wall thickness was mildly increased. There was involving 
focal basal segments. Doppler parameters were consistent with restrictive 
physiology, indicative of decreased left ventricular diastolic compliance 
and/or increased left atrial pressure. 
  
Left atrium: The atrium was moderately dilated. 
  
Mitral valve: There was mild regurgitation. 
  
 
 ASSESSMENT:  
  
 Active Problems: 
  Atrial fibrillation with rapid ventricular response ([address] (3/8/2020) Left leg cellulitis (3/8/2020) Elevated troponin (3/8/2020) Atrial fibrillation with RVR ([address] (3/8/2020) Plan:  
 
Lam Morgan is a 60year old female with permanent A, HTN, CKD, AF, DM who presented with bilat leg ulcers, pain in AF RVR. Trop was elevated, trending down. She is rate controlled and without any cardiac complaints, on warfarin. Discussed medication compliance. Hx of normal EF 2016. Will repeat. Negative leg duplex. Dr Charity Junior to follow. Tamar Stone ANP Katrin Acevedo MD, Nanette Tadeo Patient seen and examined by me with nurse practitioner. I personally performed all components of the history, physical, and medical decision making and agree with the assessment and plan with minor modifications as noted. Permanent AF with b/u ulcers and med non compliance. Rate controlled on med rx. Will repeat echo.  
 
Katrin Acevedo MD, Nanette Tadeo

## 2020-03-09 NOTE — PROGRESS NOTES
Problem: Falls - Risk of 
Goal: *Absence of Falls Description Document Rochele Jay Fall Risk and appropriate interventions in the flowsheet. Outcome: Progressing Towards Goal 
Note: Fall Risk Interventions: 
Mobility Interventions: Patient to call before getting OOB, Bed/chair exit alarm Medication Interventions: Teach patient to arise slowly, Patient to call before getting OOB, Bed/chair exit alarm Elimination Interventions: Call light in reach, Patient to call for help with toileting needs Problem: Patient Education: Go to Patient Education Activity Goal: Patient/Family Education Outcome: Progressing Towards Goal 
  
Problem: Pressure Injury - Risk of 
Goal: *Prevention of pressure injury Description Document Valdemar Scale and appropriate interventions in the flowsheet. Outcome: Progressing Towards Goal 
Note: Pressure Injury Interventions: 
Sensory Interventions: Minimize linen layers, Maintain/enhance activity level, Keep linens dry and wrinkle-free, Assess changes in LOC, Turn and reposition approx. every two hours (pillows and wedges if needed), Pressure redistribution bed/mattress (bed type) Moisture Interventions: Contain wound drainage Activity Interventions: Pressure redistribution bed/mattress(bed type), Increase time out of bed, PT/OT evaluation Mobility Interventions: Pressure redistribution bed/mattress (bed type), HOB 30 degrees or less, PT/OT evaluation Nutrition Interventions: Offer support with meals,snacks and hydration, Document food/fluid/supplement intake Problem: Patient Education: Go to Patient Education Activity Goal: Patient/Family Education Outcome: Progressing Towards Goal

## 2020-03-09 NOTE — CONSULTS
Nephrology Consult Note Monroe Kiser  
 
[facility]. [email or web address]              Phone - [phone] Patient: Shirin Ferguson YOB: 1960 Date- 3/9/2020 MRN: [id number] REASON FOR CONSULTATION: RENAL FAILURE 
CONSULTING PHYSICIAN: ROSELYN Canela 255 ADMIT DATE:3/8/2020 PATIENT Cary Lee MD  
 
IMPRESSION:  
· CKD 3 LIKELY due to DM nephropathy and HTN nephrosclerosis- bl. Cr. 1.8-2.1 · Diabetic foot · Hypertension · Metabolic acidosis · Hypokalemia · anemia · Vit d defi PLAN:  
· Start sodium bicarb 650 mg tid · Check iron panel · Check renal usg · Check urine pr/cr · Check rajendra, hepatitis, spep · Follow vanco level · Avoid acei or arb · Hold metolazone · Continue bumex · Follow bmp · Active Problems: ·   Atrial fibrillation with rapid ventricular response ([facility] (3/8/2020) · ·   Left leg cellulitis (3/8/2020) ·  
·   Elevated troponin (3/8/2020) ·  
·   Atrial fibrillation with RVR ([facility] (3/8/2020) ·  
· [x] High complexity decision making was performed 
[x] Patient is at high-risk of decompensation with multiple organ involvement Subjective: HPI: Shirin Ferguson is a 60 y.o.  female. She is admitted with diabtetic foot She has pm of ckd, htn, dm. She is not followed by nephrology as out pt Her bl. Cr. 1.8-2.1 She has proteinuria She denies NSAIDS use She is on vanco, cefepime since admission Her bp on lowside She denies any h/o renal stone No h/o esrd in family She lives with  Review of Systems: A 11 point review of system was performed today. Pertinent positives and negatives are mentioned in the HPI. The reminder of the ROS is negative and noncontributory. Past Medical History:  
Diagnosis Date  Acquired lymphedema Right arm  Arrhythmia  Asthma  [facility]  Cancer ([facility] bilateral breast  
 Chronic kidney disease  Diabetes mellitus type II   
 Dizziness  Essential hypertension  Hyperlipidemia  Hypertension  Long term (current) use of anticoagulants  Morbid obesity ([facility] 3/14/2012  Osteoarthritis  Pacemaker  Sick sinus syndrome ([facility] Past Surgical History:  
Procedure Laterality Date  ABDOMEN SURGERY PROC UNLISTED    
 gastric bypass  GASTRIC BYPASS,OBESE<150CM SAW-EN-Y  7/08  
 hutcher  HX AFIB ABLATION    
 HX CARPAL TUNNEL RELEASE [facility] [address] RIGHT MODIFIED RADICAL   
 HX MOHS PROCEDURES  1995  
 right  HX ORTHOPAEDIC    
 ight knee surgery  HX PACEMAKER    
 IR INSERT TUNL CVC W PORT OVER 5 YEARS    
 LAMINECTOMY,CERVICAL  1994  
 NEEDLE BIOPSY LIVER,W OTHR [address]  8.21.07  
 NE [address] AND 1994  NE TRABECULOPLASTY BY LASER SURGERY    
 US GUIDE ASP OVARIAN CYST ABD APPROACH  8.21.07  
 RIGHT Prior to Admission medications Medication Sig Start Date End Date Taking? Authorizing Provider  
warfarin (COUMADIN) 4 mg tablet Take 2 mg by mouth five (5) days a week. Take 1 tablet (4 mg) on Sun and Wed and a half tablet (2 mg) the other 5 days. Yes Provider, Historical  
warfarin (COUMADIN) 4 mg tablet Take 4 mg by mouth two (2) times a week. Take 1 tablet (4 mg) on Sun and Wed and a half tablet (2 mg) the other 5 days. Yes Provider, Historical  
acetaminophen 500 mg tab 500 mg, diphenhydrAMINE 25 mg cap 25 mg Take 2 Doses by mouth nightly. Provider, Historical  
potassium chloride SR (KLOR-CON 10) 10 mEq tablet Take 10 mEq by mouth daily. Indications: Patient states she intends to take this medication untl she discusses with Dr. Paul Haas on 12-12-19.     Provider, Historical  
insulin glargine (LANTUS U-100 INSULIN) 100 unit/mL injection Inject 20 units daily 11/10/19   Duncan Crane MD  
 hydrALAZINE (APRESOLINE) 100 mg tablet TAKE ONE TABLET BY MOUTH THREE TIMES A DAY 10/21/19   Samara Blanchard MD  
sertraline (ZOLOFT) 50 mg tablet TAKE ONE AND ONE-HALF (1 & 1/2) TABLET BY MOUTH DAILY 8/22/19   Samara Blanchard MD  
bumetanide (BUMEX) 1 mg tablet Take 2 mg by mouth daily. Provider, Historical  
cloNIDine HCl (CATAPRES) 0.3 mg tablet Take 0.6 mg by mouth nightly. Provider, Historical  
metoprolol succinate (TOPROL-XL) 100 mg tablet TAKE TWO TABLETS BY MOUTH DAILY 7/12/19   Samara Blanchard MD  
doxazosin (CARDURA) 4 mg tablet TAKE ONE TABLET BY MOUTH ONCE NIGHTLY 6/14/19   Samara Blanchard MD  
busPIRone (BUSPAR) 10 mg tablet TAKE ONE TABLET BY MOUTH TWICE A DAY 5/24/19   Samara Blanchard MD  
metolazone (ZAROXOLYN) 5 mg tablet Take 1 Tab by mouth daily as needed (take if develop increased LE edema, weight gain > 5 pounds. ). 4/10/14   Fortino Kraus NP  
cholecalciferol, VITAMIN D3, (VITAMIN D3) 5,000 unit tab tablet Take 5,000 Units by mouth daily. Provider, Historical  
vitamin A 8,000 unit capsule Take 8,000 Units by mouth daily. Provider, Historical  
insulin lispro (HUMALOG) 100 unit/mL kwikpen 2 units with dinner for sugars over 200 1/3/14   Samara Blanchard MD  
cyanocobalamin (VITAMIN B-12) 1,000 mcg sublingual tablet Take 1,000 mcg by mouth daily. Provider, Historical  
 
Allergies Allergen Reactions  Ace Inhibitors Cough  Amlodipine Swelling  Avapro [Irbesartan] Unable to Obtain  Bactrim [Sulfamethoxazole-Trimethoprim] Other (comments) Sore mouth  Captopril Cough  Carvedilol Diarrhea [address]  Morphine Nausea and Vomiting and Swelling  Penicillins Hives Tolerated cefepime 1/2020  Pravachol [Pravastatin] Nausea Only  Seafood [Shellfish Containing Products] Hives  Singulair [Montelukast] Other (comments)  
  palpitations Social History Tobacco Use  
  Smoking status: Never Smoker  Smokeless tobacco: Never Used Substance Use Topics  Alcohol use: Yes Comment: rare Family History Problem Relation Age of Onset  Cancer Mother  Heart Disease Mother  Alcohol abuse Father  Diabetes Brother  Hypertension Brother Objective:   
 
Patient Vitals for the past 24 hrs: 
 Temp Pulse Resp BP SpO2  
03/09/20 1209  92  129/55   
03/09/20 1055    182/65   
03/09/20 0833  94  182/65   
03/09/20 0800  94     
03/09/20 0715 98.5 °F (36.9 °C) 87 18 173/75 98 % 03/09/20 0200 98.7 °F (37.1 °C) 84 18 155/85 94 % 03/08/20 2231 98.5 °F (36.9 °C) 76 20 146/47 97 % 03/08/20 2044  (!) 102  (!) 73/56   
03/08/20 2000 99.8 °F (37.7 °C) 75 22 121/60 100 % 03/08/20 1930    123/86   
03/08/20 1837 99.5 °F (37.5 °C) 91 15 144/79 99 % 03/08/20 1800  76 18 154/46 99 % 03/08/20 1730  74 19 (!) 139/38 98 % 03/08/20 1700  89 16 156/55 100 % 03/08/20 1630  78 19 144/43 98 % 03/08/20 1445 98.7 °F (37.1 °C) (!) 136 19 148/48 99 % 03/08/20 1443  (!) 125 17 148/48 98 % 03/08/20 1344  (!) 145 20 160/55 99 % 03/08/20 1330  (!) 137 19 157/49 98 % 03/08/20 1300  (!) 131 20 149/58 98 % 03/09 0701 - 03/09 1900 In: 240 [P.O.:240] Out: - Physical Exam: 
General:Alert, No distress, Eyes:No scleral icterus, No conjunctival pallor Neck:Supple,no mass palpable,no thyromegaly Lungs:Clears to auscultation Bilaterally, normal respiratory effort CVS:RRR, S1 S2 normal,  No rub, Abdomen:Soft, Non tender, No hepatosplenomegaly Extremities: + LE edema. FOOT Dressing + Skin:No rash or lesions, Warm and DRY Psych: appropriate affect :  No buckley Musculoskeletal : no redness, no joint tenderness NEURO: Normal Speech, Non focal     
 
CODE STATUS:  f 
Care Plan discussed with:  Patient, nurse Chart reviewed. 
 
y Reviewed previous records  
y Discussion with patient and/or family and questions answered ECG[de-identified] Rev:yes Xray/CT/US/MRI REV:yes Lab Data Personally Reviewed: (see below) Recent Labs 03/09/20 
0430 03/08/20 
1127 03/08/20 
1125 03/08/20 
1025 WBC 12.5*  --   --  16.0* HGB 8.8*  --   --  9.8*   --   --  290 ANEU 10.3*  --   --  14.0* INR 3.7* 2.9*  --   --   
APTT  --  34.2*  --   --   
  --  135*  --   
K 4.3  --  4.5  --   
*  --  231*  --   
BUN 27*  --  30*  --   
CREA 1.92*  --  2.17*  --   
ALT  --   --  16  --   
SGOT  --   --  22  --   
TBILI  --   --  0.5  --   
AP  --   --  100  --   
CA 8.5  --  8.8  --   
 
Lab Results Component Value Date/Time Color YELLOW/STRAW 03/08/2020 05:00 PM  
 Appearance CLOUDY (A) 03/08/2020 05:00 PM  
 Specific gravity 1.019 03/08/2020 05:00 PM  
 pH (UA) 5.0 03/08/2020 05:00 PM  
 Protein 100 (A) 03/08/2020 05:00 PM  
 Glucose 100 (A) 03/08/2020 05:00 PM  
 Ketone 15 (A) 03/08/2020 05:00 PM  
 Bilirubin NEGATIVE  03/08/2020 05:00 PM  
 Urobilinogen 0.2 03/08/2020 05:00 PM  
 Nitrites NEGATIVE  03/08/2020 05:00 PM  
 Leukocyte Esterase SMALL (A) 03/08/2020 05:00 PM  
 Epithelial cells FEW 03/08/2020 05:00 PM  
 Bacteria NEGATIVE  03/08/2020 05:00 PM  
 WBC 0-4 03/08/2020 05:00 PM  
 RBC 0-5 03/08/2020 05:00 PM  
 
 
Lab Results Component Value Date/Time Iron 109 09/16/2009 11:28 AM  
 Ferritin 10 04/14/2010 01:33 PM  
 
Lab Results Component Value Date/Time Culture result: NO GROWTH AFTER 19 HOURS 03/08/2020 10:00 AM  
 Culture result: NO GROWTH 5 DAYS 01/14/2020 05:50 PM  
 Culture result: NO GROWTH 5 DAYS 12/06/2019 05:30 PM  
 
Prior to Admission Medications Prescriptions Last Dose Informant Patient Reported? Taking?  
acetaminophen 500 mg tab 500 mg, diphenhydrAMINE 25 mg cap 25 mg   Yes No  
Sig: Take 2 Doses by mouth nightly. bumetanide (BUMEX) 1 mg tablet  Self Yes No  
Sig: Take 2 mg by mouth daily.   
busPIRone (BUSPAR) 10 mg tablet  Self No No  
Sig: TAKE ONE TABLET BY MOUTH TWICE A DAY  
 cholecalciferol, VITAMIN D3, (VITAMIN D3) 5,000 unit tab tablet  Self Yes No  
Sig: Take 5,000 Units by mouth daily. cloNIDine HCl (CATAPRES) 0.3 mg tablet  Self Yes No  
Sig: Take 0.6 mg by mouth nightly. cyanocobalamin (VITAMIN B-12) 1,000 mcg sublingual tablet  Self Yes No  
Sig: Take 1,000 mcg by mouth daily. doxazosin (CARDURA) 4 mg tablet  Self No No  
Sig: TAKE ONE TABLET BY MOUTH ONCE NIGHTLY  
hydrALAZINE (APRESOLINE) 100 mg tablet   No No  
Sig: TAKE ONE TABLET BY MOUTH THREE TIMES A DAY  
insulin glargine (LANTUS U-100 INSULIN) 100 unit/mL injection   No No  
Sig: Inject 20 units daily  
insulin lispro (HUMALOG) 100 unit/mL kwikpen  Self Yes No  
Si units with dinner for sugars over 200  
metolazone (ZAROXOLYN) 5 mg tablet  Self No No  
Sig: Take 1 Tab by mouth daily as needed (take if develop increased LE edema, weight gain > 5 pounds. ). metoprolol succinate (TOPROL-XL) 100 mg tablet  Self No No  
Sig: TAKE TWO TABLETS BY MOUTH DAILY potassium chloride SR (KLOR-CON 10) 10 mEq tablet   Yes No  
Sig: Take 10 mEq by mouth daily. Indications: Patient states she intends to take this medication untl she discusses with Dr. Estee Smalls on 19.  
sertraline (ZOLOFT) 50 mg tablet   No No  
Sig: TAKE ONE AND ONE-HALF (1 & 1/2) TABLET BY MOUTH DAILY  
vitamin A 8,000 unit capsule  Self Yes No  
Sig: Take 8,000 Units by mouth daily. warfarin (COUMADIN) 4 mg tablet 3/5/2020  Yes Yes Sig: Take 2 mg by mouth five (5) days a week. Take 1 tablet (4 mg) on Sun and Wed and a half tablet (2 mg) the other 5 days. warfarin (COUMADIN) 4 mg tablet 3/4/2020  Yes Yes Sig: Take 4 mg by mouth two (2) times a week. Take 1 tablet (4 mg) on Sun and Wed and a half tablet (2 mg) the other 5 days. Facility-Administered Medications: None Imaging: 
 
Medications list Personally Reviewed   [x]      Yes     []               No   
Thank you for allowing us to participate in the care this patient. We will follow patient with you. Signed By: MD [facility] [employer] [address] [place], [address] Phone - [phone] Fax - [phone] [address], [address] [facility] Phone - [phone] Fax - [phone]    
[email or web address]

## 2020-03-09 NOTE — PROGRESS NOTES
Bedside and Verbal shift change report given to Desi (oncoming nurse) by Mayito Mendoza RN (offgoing nurse). Report included the following information SBAR, Kardex, Intake/Output, MAR, Recent Results and Cardiac Rhythm A-fib. 2130: Paged TeleMD to verify giving pt's BP medications. 2145: Dr. Sebastian Flores ordered to hold BP medications tonight and cut Dilt gtt back to 5mg overnight as long as HR is <100. Potentially switch to PO dilt tomorrow 0715: Report given to [facility]

## 2020-03-09 NOTE — WOUND CARE
Wound care consulted to \"wound care\" - unknown anatomy of the wound. Spoke with nursing after reviewing the chart and this consult is now canceled per podiatrist. Pt. Going to surgery tomorrow.   
Ilia Jeol RN, BSN, [employer]

## 2020-03-09 NOTE — PROGRESS NOTES
Bedside and Verbal shift change report given to Siomara Shipley (oncoming nurse) by Bridgett Cooper (offgoing nurse). Report included the following information SBAR, Kardex, ED Summary, Intake/Output, MAR, Recent Results, Cardiac Rhythm Afib and Quality Measures. 0715: Pt sitting on side of bed with no complaints of pain. VSS. MEWS Score 1. Pt on dilt gtt at 5mg/hr. 0945: Echo being done at bedside. 1000: 2 RNs attempted PIV stick multiple times with no success. Vascular access team called. 1130: Spoke with Liliana Vanegas, Pharmacist, and Tamar NP pt to be switched from IV dilt to PO dilt, Pt sing the bedside commode for void and BM. VSS. MEWS Score 1.  
 
1215: PO dilt given and dilt gtt off.  
 
1330: Podiatrist at bedside evaluating pt's feet. Pt to go to OR tomorrow for debridement of left foot. Until surgery, apply silvasorb wound gel and 4x4s and wrap with arnie on left foot. On right foot, just use 4x4 and wrap with arnie. Pt complaining of 10/10 pain, 0.5mg of dilaudid given. Jayda Leyva RN wasted 0.5mg with Chilo Squires RN. 
 
1430: Vascular duplex being performed at bedside. 1530: Pt resting quietly in bed with no complaints of pain. VSS. MEWS Score 1.  
 
1700: Pt off the floor for retroperitoneal ultrasound. 1810: [facility] to [facility]. 
 
1900: Bedside and Verbal shift change report given to Kristian Ferro RN (oncoming nurse) by Siomara Shipley (offgoing nurse). Report included the following information SBAR, Kardex, ED Summary, Intake/Output, MAR, Recent Results, Alarm Parameters  and Quality Measures.

## 2020-03-09 NOTE — PROGRESS NOTES
Pharmacy Automatic Renal Dosing Protocol - Antimicrobials Indication for Antimicrobials: skin and soft tissue infection - L leg; possible osteomyelitis, h/o chronic foot ulcers Current Regimen of Each Antimicrobial:  
Vancomycin 2000 mg x1, then 1250 q 24 hours ; start 3/8; day 2 Cefepime 2g IV q 24 hours (; day #2 Previous Antimicrobial Therapy: N/a 
 
Goal Level: VANCOMYCIN TROUGH GOAL RANGE Vancomycin Trough: 15 - 20 mcg/mL  (AUC: 400 - 600 mg/hr/Liter/day) Date Dose & Interval Measured (mcg/mL) Extrapolated (mcg/mL) Date & time of next level: before 2nd maintenance dose Significant Cultures:  
3/8 blood cx pending Radiology / Imaging results: (X-ray, CT scan or MRI):  
3/8 L foot XR IMPRESSION: No signal change. Plantar soft tissue ulcers. No apparent osteomyelitis. 3/8 LLE CT (prelim) Unenhanced CT Left Foot show no bone erosion, periostitis or other radiographic 
features of overt osteomyelitis. There is plantar soft tissue emphysema. Paralysis, amputations, malnutrition: none noted Labs: 
Recent Labs 20 
0430 20 
1125 20 
1025 CREA 1.92* 2.17*  --   
BUN 27* 30*  --   
WBC 12.5*  --  16.0* Temp (24hrs), Av.9 °F (37.2 °C), Min:98.4 °F (36.9 °C), Max:99.8 °F (37.7 °C) Creatinine Clearance (mL/min) or Dialysis: 32.6 mL/min (IBW) Impression/Plan:  
Scr trending down WBC trending down Afebrile Vancomycin 1250 mg IV q24h after loading dose, anticipated trough 17.35 Will adjust the Cefepime to q 12 hours Vancomycin trough on 3/10 at 11 AM 
Antimicrobial stop date pending Pharmacy will follow daily and adjust medications as appropriate for renal function and/or serum levels. Thank you, Yeimi Calero, PHARMD

## 2020-03-10 ENCOUNTER — ANESTHESIA (OUTPATIENT)
Dept: SURGERY | Age: 60
DRG: 623 | End: 2020-03-10
Payer: COMMERCIAL

## 2020-03-10 LAB
ANION GAP SERPL CALC-SCNC: 10 MMOL/L (ref 5–15)
BUN SERPL-MCNC: 25 MG/DL (ref 6–20)
BUN/CREAT SERPL: 13 (ref 12–20)
CALCIUM SERPL-MCNC: 9 MG/DL (ref 8.5–10.1)
CHLORIDE SERPL-SCNC: 106 MMOL/L (ref 97–108)
CO2 SERPL-SCNC: 18 MMOL/L (ref 21–32)
CREAT SERPL-MCNC: 1.88 MG/DL (ref 0.55–1.02)
DATE LAST DOSE: ABNORMAL
GLUCOSE BLD STRIP.AUTO-MCNC: 116 MG/DL (ref 65–100)
GLUCOSE BLD STRIP.AUTO-MCNC: 126 MG/DL (ref 65–100)
GLUCOSE BLD STRIP.AUTO-MCNC: 129 MG/DL (ref 65–100)
GLUCOSE BLD STRIP.AUTO-MCNC: 131 MG/DL (ref 65–100)
GLUCOSE BLD STRIP.AUTO-MCNC: 143 MG/DL (ref 65–100)
GLUCOSE BLD STRIP.AUTO-MCNC: 159 MG/DL (ref 65–100)
GLUCOSE SERPL-MCNC: 123 MG/DL (ref 65–100)
HAV IGM SER QL: NONREACTIVE
HBV CORE IGM SER QL: NONREACTIVE
HBV SURFACE AG SER QL: <0.1 INDEX
HBV SURFACE AG SER QL: NEGATIVE
HCV AB SERPL QL IA: NONREACTIVE
HCV COMMENT,HCGAC: NORMAL
INR PPP: 1.4 (ref 0.9–1.1)
INR PPP: 2.3 (ref 0.9–1.1)
LEFT ARM BP: 134 MMHG
LEFT TBI: 0.42
LEFT TOE PRESSURE: 56 MMHG
POTASSIUM SERPL-SCNC: 3.8 MMOL/L (ref 3.5–5.1)
PROTHROMBIN TIME: 14.4 SEC (ref 9–11.1)
PROTHROMBIN TIME: 22.5 SEC (ref 9–11.1)
REPORTED DOSE,DOSE: ABNORMAL UNITS
REPORTED DOSE/TIME,TMG: ABNORMAL
RIGHT TBI: 0.57
RIGHT TOE PRESSURE: 76 MMHG
SERVICE CMNT-IMP: ABNORMAL
SODIUM SERPL-SCNC: 134 MMOL/L (ref 136–145)
SP1: NORMAL
SP2: NORMAL
SP3: NORMAL
VANCOMYCIN TROUGH SERPL-MCNC: 19.6 UG/ML (ref 5–10)

## 2020-03-10 PROCEDURE — 82784 ASSAY IGA/IGD/IGG/IGM EACH: CPT

## 2020-03-10 PROCEDURE — 80074 ACUTE HEPATITIS PANEL: CPT

## 2020-03-10 PROCEDURE — 76010000149 HC OR TIME 1 TO 1.5 HR: Performed by: PODIATRIST

## 2020-03-10 PROCEDURE — 74011636637 HC RX REV CODE- 636/637: Performed by: INTERNAL MEDICINE

## 2020-03-10 PROCEDURE — 77030031139 HC SUT VCRL2 J&J -A: Performed by: PODIATRIST

## 2020-03-10 PROCEDURE — 74011250637 HC RX REV CODE- 250/637: Performed by: PODIATRIST

## 2020-03-10 PROCEDURE — 86160 COMPLEMENT ANTIGEN: CPT

## 2020-03-10 PROCEDURE — 74011250637 HC RX REV CODE- 250/637: Performed by: NURSE PRACTITIONER

## 2020-03-10 PROCEDURE — 87147 CULTURE TYPE IMMUNOLOGIC: CPT

## 2020-03-10 PROCEDURE — 74011250636 HC RX REV CODE- 250/636: Performed by: FAMILY MEDICINE

## 2020-03-10 PROCEDURE — 88304 TISSUE EXAM BY PATHOLOGIST: CPT

## 2020-03-10 PROCEDURE — 80202 ASSAY OF VANCOMYCIN: CPT

## 2020-03-10 PROCEDURE — 74011000258 HC RX REV CODE- 258: Performed by: INTERNAL MEDICINE

## 2020-03-10 PROCEDURE — 74011250637 HC RX REV CODE- 250/637: Performed by: FAMILY MEDICINE

## 2020-03-10 PROCEDURE — 87077 CULTURE AEROBIC IDENTIFY: CPT

## 2020-03-10 PROCEDURE — 77030011640 HC PAD GRND REM COVD -A: Performed by: PODIATRIST

## 2020-03-10 PROCEDURE — 74011250636 HC RX REV CODE- 250/636: Performed by: NURSE ANESTHETIST, CERTIFIED REGISTERED

## 2020-03-10 PROCEDURE — 76060000033 HC ANESTHESIA 1 TO 1.5 HR: Performed by: PODIATRIST

## 2020-03-10 PROCEDURE — 77030014007 HC SPNG HEMSTAT J&J -B: Performed by: PODIATRIST

## 2020-03-10 PROCEDURE — 74011000250 HC RX REV CODE- 250: Performed by: INTERNAL MEDICINE

## 2020-03-10 PROCEDURE — 87185 SC STD ENZYME DETCJ PER NZM: CPT

## 2020-03-10 PROCEDURE — 74011000250 HC RX REV CODE- 250: Performed by: NURSE ANESTHETIST, CERTIFIED REGISTERED

## 2020-03-10 PROCEDURE — 85610 PROTHROMBIN TIME: CPT

## 2020-03-10 PROCEDURE — 86038 ANTINUCLEAR ANTIBODIES: CPT

## 2020-03-10 PROCEDURE — 82962 GLUCOSE BLOOD TEST: CPT

## 2020-03-10 PROCEDURE — 77030039464 HC TU IRR ENDO DISP MSNX -B: Performed by: PODIATRIST

## 2020-03-10 PROCEDURE — 88311 DECALCIFY TISSUE: CPT

## 2020-03-10 PROCEDURE — 87205 SMEAR GRAM STAIN: CPT

## 2020-03-10 PROCEDURE — 77030039465 HC PRB ASPIR SONICVAC MSNX -F: Performed by: PODIATRIST

## 2020-03-10 PROCEDURE — 87075 CULTR BACTERIA EXCEPT BLOOD: CPT

## 2020-03-10 PROCEDURE — 86225 DNA ANTIBODY NATIVE: CPT

## 2020-03-10 PROCEDURE — 74011250637 HC RX REV CODE- 250/637: Performed by: INTERNAL MEDICINE

## 2020-03-10 PROCEDURE — 74011636637 HC RX REV CODE- 636/637: Performed by: FAMILY MEDICINE

## 2020-03-10 PROCEDURE — 65270000029 HC RM PRIVATE

## 2020-03-10 PROCEDURE — 74011000250 HC RX REV CODE- 250: Performed by: PODIATRIST

## 2020-03-10 PROCEDURE — 74011250636 HC RX REV CODE- 250/636: Performed by: PODIATRIST

## 2020-03-10 PROCEDURE — 36415 COLL VENOUS BLD VENIPUNCTURE: CPT

## 2020-03-10 PROCEDURE — 77030000032 HC CUF TRNQT ZIMM -B: Performed by: PODIATRIST

## 2020-03-10 PROCEDURE — 87186 SC STD MICRODIL/AGAR DIL: CPT

## 2020-03-10 PROCEDURE — 74011000258 HC RX REV CODE- 258: Performed by: PODIATRIST

## 2020-03-10 PROCEDURE — 74011250636 HC RX REV CODE- 250/636: Performed by: INTERNAL MEDICINE

## 2020-03-10 PROCEDURE — 80048 BASIC METABOLIC PNL TOTAL CA: CPT

## 2020-03-10 PROCEDURE — 88307 TISSUE EXAM BY PATHOLOGIST: CPT

## 2020-03-10 PROCEDURE — 77030018836 HC SOL IRR NACL ICUM -A: Performed by: PODIATRIST

## 2020-03-10 PROCEDURE — 76210000063 HC OR PH I REC FIRST 0.5 HR: Performed by: PODIATRIST

## 2020-03-10 RX ORDER — PROPOFOL 10 MG/ML
INJECTION, EMULSION INTRAVENOUS
Status: DISCONTINUED | OUTPATIENT
Start: 2020-03-10 | End: 2020-03-10 | Stop reason: HOSPADM

## 2020-03-10 RX ORDER — SODIUM CHLORIDE 0.9 % (FLUSH) 0.9 %
5-40 SYRINGE (ML) INJECTION AS NEEDED
Status: DISCONTINUED | OUTPATIENT
Start: 2020-03-10 | End: 2020-03-10 | Stop reason: HOSPADM

## 2020-03-10 RX ORDER — DIPHENHYDRAMINE HYDROCHLORIDE 50 MG/ML
12.5 INJECTION, SOLUTION INTRAMUSCULAR; INTRAVENOUS AS NEEDED
Status: DISCONTINUED | OUTPATIENT
Start: 2020-03-10 | End: 2020-03-10 | Stop reason: HOSPADM

## 2020-03-10 RX ORDER — HYDROMORPHONE HYDROCHLORIDE 1 MG/ML
.2-.5 INJECTION, SOLUTION INTRAMUSCULAR; INTRAVENOUS; SUBCUTANEOUS
Status: DISCONTINUED | OUTPATIENT
Start: 2020-03-10 | End: 2020-03-10 | Stop reason: HOSPADM

## 2020-03-10 RX ORDER — SODIUM CHLORIDE 0.9 % (FLUSH) 0.9 %
5-40 SYRINGE (ML) INJECTION EVERY 8 HOURS
Status: DISCONTINUED | OUTPATIENT
Start: 2020-03-10 | End: 2020-03-10 | Stop reason: HOSPADM

## 2020-03-10 RX ORDER — WARFARIN 2 MG/1
4 TABLET ORAL ONCE
Status: DISCONTINUED | OUTPATIENT
Start: 2020-03-10 | End: 2020-03-10

## 2020-03-10 RX ORDER — ONDANSETRON 2 MG/ML
INJECTION INTRAMUSCULAR; INTRAVENOUS AS NEEDED
Status: DISCONTINUED | OUTPATIENT
Start: 2020-03-10 | End: 2020-03-10 | Stop reason: HOSPADM

## 2020-03-10 RX ORDER — SODIUM CHLORIDE, SODIUM LACTATE, POTASSIUM CHLORIDE, CALCIUM CHLORIDE 600; 310; 30; 20 MG/100ML; MG/100ML; MG/100ML; MG/100ML
INJECTION, SOLUTION INTRAVENOUS
Status: DISCONTINUED | OUTPATIENT
Start: 2020-03-10 | End: 2020-03-10 | Stop reason: HOSPADM

## 2020-03-10 RX ORDER — PROPOFOL 10 MG/ML
INJECTION, EMULSION INTRAVENOUS AS NEEDED
Status: DISCONTINUED | OUTPATIENT
Start: 2020-03-10 | End: 2020-03-10 | Stop reason: HOSPADM

## 2020-03-10 RX ORDER — EPHEDRINE SULFATE/0.9% NACL/PF 50 MG/5 ML
SYRINGE (ML) INTRAVENOUS AS NEEDED
Status: DISCONTINUED | OUTPATIENT
Start: 2020-03-10 | End: 2020-03-10 | Stop reason: HOSPADM

## 2020-03-10 RX ORDER — SODIUM CHLORIDE, SODIUM LACTATE, POTASSIUM CHLORIDE, CALCIUM CHLORIDE 600; 310; 30; 20 MG/100ML; MG/100ML; MG/100ML; MG/100ML
25 INJECTION, SOLUTION INTRAVENOUS CONTINUOUS
Status: DISCONTINUED | OUTPATIENT
Start: 2020-03-10 | End: 2020-03-10 | Stop reason: HOSPADM

## 2020-03-10 RX ORDER — FENTANYL CITRATE 50 UG/ML
INJECTION, SOLUTION INTRAMUSCULAR; INTRAVENOUS AS NEEDED
Status: DISCONTINUED | OUTPATIENT
Start: 2020-03-10 | End: 2020-03-10 | Stop reason: HOSPADM

## 2020-03-10 RX ORDER — MIDAZOLAM HYDROCHLORIDE 1 MG/ML
INJECTION, SOLUTION INTRAMUSCULAR; INTRAVENOUS AS NEEDED
Status: DISCONTINUED | OUTPATIENT
Start: 2020-03-10 | End: 2020-03-10 | Stop reason: HOSPADM

## 2020-03-10 RX ORDER — HYDROMORPHONE HYDROCHLORIDE 1 MG/ML
0.5 INJECTION, SOLUTION INTRAMUSCULAR; INTRAVENOUS; SUBCUTANEOUS
Status: DISCONTINUED | OUTPATIENT
Start: 2020-03-10 | End: 2020-03-22 | Stop reason: HOSPADM

## 2020-03-10 RX ORDER — BUPIVACAINE HYDROCHLORIDE 5 MG/ML
INJECTION, SOLUTION EPIDURAL; INTRACAUDAL AS NEEDED
Status: DISCONTINUED | OUTPATIENT
Start: 2020-03-10 | End: 2020-03-10 | Stop reason: HOSPADM

## 2020-03-10 RX ORDER — ONDANSETRON 2 MG/ML
4 INJECTION INTRAMUSCULAR; INTRAVENOUS AS NEEDED
Status: DISCONTINUED | OUTPATIENT
Start: 2020-03-10 | End: 2020-03-10 | Stop reason: HOSPADM

## 2020-03-10 RX ORDER — MORPHINE SULFATE 10 MG/ML
2 INJECTION, SOLUTION INTRAMUSCULAR; INTRAVENOUS
Status: DISCONTINUED | OUTPATIENT
Start: 2020-03-10 | End: 2020-03-10 | Stop reason: HOSPADM

## 2020-03-10 RX ORDER — FENTANYL CITRATE 50 UG/ML
25 INJECTION, SOLUTION INTRAMUSCULAR; INTRAVENOUS
Status: DISCONTINUED | OUTPATIENT
Start: 2020-03-10 | End: 2020-03-10 | Stop reason: HOSPADM

## 2020-03-10 RX ORDER — BUMETANIDE 0.25 MG/ML
1 INJECTION INTRAMUSCULAR; INTRAVENOUS 2 TIMES DAILY
Status: DISCONTINUED | OUTPATIENT
Start: 2020-03-10 | End: 2020-03-16

## 2020-03-10 RX ADMIN — Medication 12.5 MG: at 19:16

## 2020-03-10 RX ADMIN — MIDAZOLAM HYDROCHLORIDE 2 MG: 1 INJECTION, SOLUTION INTRAMUSCULAR; INTRAVENOUS at 18:44

## 2020-03-10 RX ADMIN — INSULIN LISPRO 5 UNITS: 100 INJECTION, SOLUTION INTRAVENOUS; SUBCUTANEOUS at 08:03

## 2020-03-10 RX ADMIN — PROPOFOL 50 MG: 10 INJECTION, EMULSION INTRAVENOUS at 18:51

## 2020-03-10 RX ADMIN — SODIUM CHLORIDE, POTASSIUM CHLORIDE, SODIUM LACTATE AND CALCIUM CHLORIDE: 600; 310; 30; 20 INJECTION, SOLUTION INTRAVENOUS at 18:15

## 2020-03-10 RX ADMIN — DOXAZOSIN 4 MG: 2 TABLET ORAL at 08:05

## 2020-03-10 RX ADMIN — VANCOMYCIN HYDROCHLORIDE 1000 MG: 1 INJECTION, POWDER, LYOPHILIZED, FOR SOLUTION INTRAVENOUS at 12:37

## 2020-03-10 RX ADMIN — FENTANYL CITRATE 25 MCG: 50 INJECTION, SOLUTION INTRAMUSCULAR; INTRAVENOUS at 19:23

## 2020-03-10 RX ADMIN — POTASSIUM CHLORIDE 10 MEQ: 750 TABLET, FILM COATED, EXTENDED RELEASE ORAL at 08:05

## 2020-03-10 RX ADMIN — INSULIN LISPRO 2 UNITS: 100 INJECTION, SOLUTION INTRAVENOUS; SUBCUTANEOUS at 16:30

## 2020-03-10 RX ADMIN — HYDRALAZINE HYDROCHLORIDE 100 MG: 50 TABLET, FILM COATED ORAL at 06:17

## 2020-03-10 RX ADMIN — Medication 12.5 MG: at 19:37

## 2020-03-10 RX ADMIN — HYDRALAZINE HYDROCHLORIDE 100 MG: 50 TABLET, FILM COATED ORAL at 21:35

## 2020-03-10 RX ADMIN — MELATONIN 5 TABLET: at 08:05

## 2020-03-10 RX ADMIN — FENTANYL CITRATE 50 MCG: 50 INJECTION, SOLUTION INTRAMUSCULAR; INTRAVENOUS at 18:51

## 2020-03-10 RX ADMIN — BUSPIRONE HYDROCHLORIDE 10 MG: 10 TABLET ORAL at 08:05

## 2020-03-10 RX ADMIN — BUMETANIDE 1 MG: 0.25 INJECTION INTRAMUSCULAR; INTRAVENOUS at 12:02

## 2020-03-10 RX ADMIN — CYANOCOBALAMIN TAB 500 MCG 1000 MCG: 500 TAB at 08:05

## 2020-03-10 RX ADMIN — SERTRALINE HYDROCHLORIDE 50 MG: 50 TABLET ORAL at 08:05

## 2020-03-10 RX ADMIN — HYDROMORPHONE HYDROCHLORIDE 0.5 MG: 1 INJECTION, SOLUTION INTRAMUSCULAR; INTRAVENOUS; SUBCUTANEOUS at 01:52

## 2020-03-10 RX ADMIN — SODIUM BICARBONATE 650 MG: 650 TABLET ORAL at 08:06

## 2020-03-10 RX ADMIN — PROPOFOL 50 MCG/KG/MIN: 10 INJECTION, EMULSION INTRAVENOUS at 18:51

## 2020-03-10 RX ADMIN — HYDRALAZINE HYDROCHLORIDE 100 MG: 50 TABLET, FILM COATED ORAL at 15:01

## 2020-03-10 RX ADMIN — ONDANSETRON HYDROCHLORIDE 4 MG: 2 INJECTION, SOLUTION INTRAMUSCULAR; INTRAVENOUS at 19:06

## 2020-03-10 RX ADMIN — SODIUM BICARBONATE 650 MG: 650 TABLET ORAL at 16:21

## 2020-03-10 RX ADMIN — METOPROLOL SUCCINATE 100 MG: 50 TABLET, EXTENDED RELEASE ORAL at 08:04

## 2020-03-10 RX ADMIN — FENTANYL CITRATE 25 MCG: 50 INJECTION, SOLUTION INTRAMUSCULAR; INTRAVENOUS at 19:19

## 2020-03-10 RX ADMIN — INSULIN GLARGINE 20 UNITS: 100 INJECTION, SOLUTION SUBCUTANEOUS at 08:04

## 2020-03-10 RX ADMIN — Medication 10 ML: at 21:42

## 2020-03-10 RX ADMIN — DILTIAZEM HYDROCHLORIDE 60 MG: 60 TABLET, FILM COATED ORAL at 16:21

## 2020-03-10 RX ADMIN — DILTIAZEM HYDROCHLORIDE 60 MG: 60 TABLET, FILM COATED ORAL at 12:03

## 2020-03-10 RX ADMIN — DILTIAZEM HYDROCHLORIDE 60 MG: 60 TABLET, FILM COATED ORAL at 08:05

## 2020-03-10 RX ADMIN — Medication 10 ML: at 06:18

## 2020-03-10 RX ADMIN — SODIUM BICARBONATE 650 MG: 650 TABLET ORAL at 21:35

## 2020-03-10 RX ADMIN — Medication 10 ML: at 15:02

## 2020-03-10 RX ADMIN — CEFEPIME HYDROCHLORIDE 2 G: 2 INJECTION, POWDER, FOR SOLUTION INTRAVENOUS at 09:46

## 2020-03-10 RX ADMIN — Medication 3 AMPULE: at 17:46

## 2020-03-10 RX ADMIN — CEFEPIME HYDROCHLORIDE 2 G: 2 INJECTION, POWDER, FOR SOLUTION INTRAVENOUS at 21:35

## 2020-03-10 RX ADMIN — Medication 10 ML: at 10:18

## 2020-03-10 NOTE — PROGRESS NOTES
Reason for Admission:   Atrial fibrillation with RVR 
            
RUR Score:    28 PCP: First and Last name: Brooklyn Desai Approximate date of last visit: 3 weeks ago Resources/supports as identified by patient/family:   Spouse ,family and friends live near by. Top Challenges facing patient (as identified by patient/family and CM): Finances/Medication cost?      No an issue- has inssurance Transportation? Spouse will transport upon discharge Support system or lack thereof? Has good support from spouse and friends. Living arrangements? Live with spouse in a single home. Self-care/ADLs/Cognition? Very independent. Current Advanced Directive/Advance Care Plan: On file Plan for utilizing home health:    Yes if needed. Transition of Care Plan:       Home with [facility] Care Management Interventions PCP Verified by CM: Yes(3 weeks ago) Mode of Transport at Discharge: Self(Spouse will transport) Transition of Care Consult (CM Consult): Home Health(for SN? No previous HH or rehab experiance) Discharge Durable Medical Equipment: No(Very independent including driving.) Physical Therapy Consult: No 
Occupational Therapy Consult: No 
Speech Therapy Consult: No 
Current Support Network: Lives with Spouse Discharge Location Discharge Placement: Home with home health Pharmacy- [address] [place] [address] is an insuline dependent. Brain Tesha MSW 
ED Case Manager Ext [phone]

## 2020-03-10 NOTE — PROGRESS NOTES
Problem: Falls - Risk of 
Goal: *Absence of Falls Description Document Berhane Tao Fall Risk and appropriate interventions in the flowsheet. Outcome: Progressing Towards Goal 
Note: Fall Risk Interventions: 
Mobility Interventions: Patient to call before getting OOB, PT Consult for mobility concerns, PT Consult for assist device competence, OT consult for ADLs Medication Interventions: Patient to call before getting OOB, Teach patient to arise slowly, Bed/chair exit alarm Elimination Interventions: Call light in reach, Patient to call for help with toileting needs Problem: Patient Education: Go to Patient Education Activity Goal: Patient/Family Education Outcome: Progressing Towards Goal 
  
Problem: Pressure Injury - Risk of 
Goal: *Prevention of pressure injury Description Document Valdemar Scale and appropriate interventions in the flowsheet. Outcome: Progressing Towards Goal 
Note: Pressure Injury Interventions: 
Sensory Interventions: Maintain/enhance activity level, Minimize linen layers, Pressure redistribution bed/mattress (bed type), Float heels, Keep linens dry and wrinkle-free Moisture Interventions: Apply protective barrier, creams and emollients, Absorbent underpads, Minimize layers, Moisture barrier Activity Interventions: Increase time out of bed, Pressure redistribution bed/mattress(bed type), PT/OT evaluation Mobility Interventions: HOB 30 degrees or less, Pressure redistribution bed/mattress (bed type), PT/OT evaluation Nutrition Interventions: Document food/fluid/supplement intake, Offer support with meals,snacks and hydration Problem: Patient Education: Go to Patient Education Activity Goal: Patient/Family Education Outcome: Progressing Towards Goal

## 2020-03-10 NOTE — ANESTHESIA PREPROCEDURE EVALUATION
Relevant Problems No relevant active problems Anesthetic History PONV Review of Systems / Medical History Patient summary reviewed, nursing notes reviewed and pertinent labs reviewed Pulmonary Asthma Neuro/Psych Within defined limits Cardiovascular Hypertension Dysrhythmias : atrial fibrillation Pacemaker (PM-sss) Exercise tolerance: >4 METS Comments: Preserved EF on echo, cleared by cardiology GI/Hepatic/Renal 
Within defined limits Endo/Other Diabetes Obesity, arthritis and cancer (breast) Other Findings Comments: Right arm lymphedema Gastric bypass Neck and back surgery Physical Exam 
 
Airway Mallampati: II 
TM Distance: 4 - 6 cm Neck ROM: normal range of motion Mouth opening: Normal 
 
 Cardiovascular Regular rate and rhythm,  S1 and S2 normal,  no murmur, click, rub, or gallop Dental 
No notable dental hx Pulmonary Breath sounds clear to auscultation Abdominal 
GI exam deferred Other Findings Anesthetic Plan ASA: 3 Anesthesia type: MAC Anesthetic plan and risks discussed with: Patient

## 2020-03-10 NOTE — PROGRESS NOTES
Nephrology Progress Note [facility] 
[email or web address]                  Phone - [phone] Patient: Tomas Juarez Date- 3/10/2020 Admit Date: 3/8/2020 YOB: 1960 CC: Follow up for  Ckd Subjective: Interval History: -  Cr stable Na dropped Acidosis improving No c/o sob, No c/o chest pain, No c/o nausea or vomiting No c/o  fever. ROS:- as above IMPRESSION:  
· Ckd 3 LIKELY due to DM nephropathy and HTN nephrosclerosis- bl. Cr. 1.8-2.1 · Hyponatremia due to fluid overload · Edema · Diabetic foot · Proteinuria likely due to DM nephropathy and HTN nephroscl- urine pr/cr 1.5 g/g · Hypertension · Metabolic acidosis · Hypokalemia · anemia · Vit d defi PLAN:  
· Change bumex to 1 mg bid · Continue po bicarb · Check iron panel - may need epogen · Follow bmp · Renal usg results noted · Follow serology · Hold clonidine,  
· Hold metolazone · She will benefit from acei or arb - Physical exam:  
GEN: NAD NECK- Supple, no mass RESP: Clear b/l, no wheezing, No accessory muscle use CVS: S1,S2  RRR 
ABDO: soft , non tender, No mass NEURO: normal speech, non focal 
EXT: + Edema Foot dressing + Care Plan discussed with: patient Objective:  
Visit Vitals /60 (BP 1 Location: Left arm, BP Patient Position: At rest) Pulse 88 Temp 97.4 °F (36.3 °C) Resp 18 Ht 5' 9\" (1.753 m) Wt 112.5 kg (248 lb) SpO2 96% BMI 36.62 kg/m² Last 3 Recorded Weights in this Encounter 03/08/20 7245 03/09/20 0453 03/09/20 1055 Weight: 116.6 kg (257 lb) 112.5 kg (248 lb) 112.5 kg (248 lb) 03/08 1901 - 03/10 0700 In: 1026.1 [P.O.:480; I.V.:546.1] Out: 450 [Urine:450] Intake/Output Summary (Last 24 hours) at 3/10/2020 1029 Last data filed at 3/10/2020 6852 Gross per 24 hour Intake 639 ml Output 500 ml Net 139 ml  
  
 Chart reviewed. Pertinent Notes reviewed. Medication list  reviewed Current Facility-Administered Medications Medication  bumetanide (BUMEX) injection 1 mg  VANCOMYCIN INFORMATION NOTE  cefepime (MAXIPIME) 2 g in 0.9% sodium chloride (MBP/ADV) 100 mL  dilTIAZem (CARDIZEM) IR tablet 60 mg  
 insulin lispro (HUMALOG) injection 5 Units  WARFARIN INFORMATION NOTE (COUMADIN)  sodium bicarbonate tablet 650 mg  
 sodium chloride (NS) flush 5-10 mL  vancomycin (VANCOCIN) 1,250 mg in 0.9% sodium chloride 250 mL IVPB  busPIRone (BUSPAR) tablet 10 mg  
 cholecalciferol (VITAMIN D3) (1000 Units /25 mcg) tablet 5 Tab  cyanocobalamin (VITAMIN B12) tablet 1,000 mcg  doxazosin (CARDURA) tablet 4 mg  hydrALAZINE (APRESOLINE) tablet 100 mg  
 insulin glargine (LANTUS) injection 20 Units  metoprolol succinate (TOPROL-XL) XL tablet 100 mg  potassium chloride SR (KLOR-CON 10) tablet 10 mEq  sertraline (ZOLOFT) tablet 50 mg  
 sodium chloride (NS) flush 5-40 mL  sodium chloride (NS) flush 5-40 mL  acetaminophen (TYLENOL) tablet 650 mg  
 HYDROmorphone (PF) (DILAUDID) injection 0.5 mg  
 naloxone (NARCAN) injection 0.4 mg  
 glucose chewable tablet 16 g  
 dextrose (D50W) injection syrg 12.5-25 g  
 glucagon (GLUCAGEN) injection 1 mg  
 insulin lispro (HUMALOG) injection Data Review : 
Recent Labs  
  03/10/20 
0158 03/09/20 
0430 03/08/20 
1125 * 136 135* K 3.8 4.3 4.5  
 110* 108 CO2 18* 17* 16*  
BUN 25* 27* 30* CREA 1.88* 1.92* 2.17* * 155* 231* CA 9.0 8.5 8.8 Recent Labs 03/09/20 
0430 03/08/20 
1025 WBC 12.5* 16.0* HGB 8.8* 9.8* HCT 28.7* 31.1*  
 290 No results for input(s): FE, TIBC, PSAT, FERR in the last 72 hours. Recent Labs 03/08/20 
1909 03/08/20 
1125 TROIQ 0.40* 0.45* Lab Results Component Value Date/Time  Color YELLOW/STRAW 03/08/2020 05:00 PM  
 Appearance CLOUDY (A) 03/08/2020 05:00 PM  
 Specific gravity 1.019 03/08/2020 05:00 PM  
 pH (UA) 5.0 03/08/2020 05:00 PM  
 Protein 100 (A) 03/08/2020 05:00 PM  
 Glucose 100 (A) 03/08/2020 05:00 PM  
 Ketone 15 (A) 03/08/2020 05:00 PM  
 Bilirubin NEGATIVE  03/08/2020 05:00 PM  
 Urobilinogen 0.2 03/08/2020 05:00 PM  
 Nitrites NEGATIVE  03/08/2020 05:00 PM  
 Leukocyte Esterase SMALL (A) 03/08/2020 05:00 PM  
 Epithelial cells FEW 03/08/2020 05:00 PM  
 Bacteria NEGATIVE  03/08/2020 05:00 PM  
 WBC 0-4 03/08/2020 05:00 PM  
 RBC 0-5 03/08/2020 05:00 PM  
 
Lab Results Component Value Date/Time Culture result: NO GROWTH 2 DAYS 03/08/2020 10:00 AM  
 Culture result: NO GROWTH 5 DAYS 01/14/2020 05:50 PM  
 Culture result: NO GROWTH 5 DAYS 12/06/2019 05:30 PM  
 
Lab Results Component Value Date/Time Specimen Description: RENEE 04/09/2014 12:27 PM  
 Specimen Description: [facility] 10/28/2013 05:00 PM  
 Specimen Description: [facility] 10/28/2013 03:09 PM  
 
Lab Results Component Value Date/Time Creatinine, urine 45.70 03/09/2020 09:50 PM  
 
Results from [facility] Encounter encounter on 03/08/20 XR FOOT LT MIN 3 V Narrative EXAM: XR FOOT LT MIN 3 V 
 
INDICATION: L foot swelling; eval for osteo changes. COMPARISON: 3/3/2020. FINDINGS: Three views of the left foot demonstrate no bone erosion, periostitis 
or other evidence of osteomyelitis. There are 2 plantar soft tissue ulcers, 
without foreign body. There is chronic deformity of the distal portion of the second toe and mid foot 
osteoarthrosis. No acute fracture is seen. Arterial calcification is noted. Impression IMPRESSION: No signal change. Plantar soft tissue ulcers. No apparent 
osteomyelitis. RENAL USG 
TECHNIQUE:  
Routine ultrasound images of the kidneys and retroperitoneum were obtained. 
  
FINDINGS:  
The right kidney measures 11.5 cm in length. The left kidney measures 10.7 cm in 
length. There is increased echogenicity of the kidneys bilaterally, consistent with 
medical renal disease. There is thinning of the right renal cortex. No renal 
calculus is seen. There is no hydronephrosis. No renal cyst or solid mass is 
evident.   
  
The abdominal aorta and common iliac arteries were obscured by gas. The 
visualized portion of the IVC appears patent. The urinary bladder demonstrates 
no filling defect.  
  
IMPRESSION IMPRESSION:  
Echogenic kidneys, consistent with medical renal disease. Cortical thinning on 
the right. No hydronephrosis. . 
  
 
                Lion Beard MD 
[facility] 
 [email or web address] [facility] [place], [address] Phone - [phone] Fax - [phone]

## 2020-03-10 NOTE — PROGRESS NOTES
Pharmacy Dosing of Warfarin Indication: a fib Goal INR: 2-3 PTA Warfarin Dose: 4 mg on Sun, Wed and 2 mg on all other days Concurrent anticoagulants: none Concurrent antiplatelet: none Major Interacting Medications Drugs that may increase INR: none Drugs that may decrease INR: none Conditions that may increase/decrease INR (CHF, C. diff, cirrhosis, thyroid disorder, hypoalbuminemia):  
 
Labs: 
Recent Labs  
  03/10/20 
0158 03/09/20 
0430 03/08/20 
1127 03/08/20 
1125 03/08/20 
1025 INR 2.3* 3.7* 2.9*  --   --   
HGB  --  8.8*  --   --  9.8* PLT  --  286  --   --  290 SGOT  --   --   --  22  --   
TBILI  --   --   --  0.5  --   
ALB  --   --   --  2.8*  -- Impression/Plan:   
Pharmacy consulted to dose warfarin on 3/9 Warfarin held on 3/9 for supratherapeutic INR Will hold warfarin for tonight since the patient is going for foot debridement and will resume the warfarin until further notice Patient received Vitamin K 2.5 mg PO on 3/9 at around 6 PM.  The oral Vitamin K takes about 24 - 36 hours to see the full effect of it. Will do a repeat INR this afternoon at 3 PM 
INR daily, CBC w/o diff QOD Thanks Anna Jordan PHARMD 
 
[email or web address]. pdf

## 2020-03-10 NOTE — PERIOP NOTES
TRANSFER - IN REPORT: 
 
Verbal report received from Los magdy, RN on Марина Cortez  being received from 2268 for ordered procedure Report consisted of patients Situation, Background, Assessment and  
Recommendations(SBAR). Information from the following report(s) SBAR, Kardex, Intake/Output, MAR and Recent Results was reviewed with the receiving nurse. Opportunity for questions and clarification was provided. Assessment completed upon patients arrival to unit and care assumed.

## 2020-03-10 NOTE — PROGRESS NOTES
Cardiac Electrophysiology Progress Note [address], [place], [address] [facility]  [phone] 
 
3/10/2020 10:36 AM 
 
Admit Date: 3/8/2020 Admit Diagnosis: Atrial fibrillation with RVR ([address] [I48.91] Left leg cellulitis [Q43.341] Subjective:  
 
Jacoby Amboy   denies chest pain, chest pressure/discomfort, dyspnea, palpitations. Visit Vitals /60 (BP 1 Location: Left arm, BP Patient Position: At rest) Pulse 88 Temp 97.4 °F (36.3 °C) Resp 18 Ht 5' 9\" (1.753 m) Wt 248 lb (112.5 kg) SpO2 96% BMI 36.62 kg/m² Current Facility-Administered Medications Medication Dose Route Frequency  bumetanide (BUMEX) injection 1 mg  1 mg IntraVENous BID  VANCOMYCIN INFORMATION NOTE   Other ONCE  
 cefepime (MAXIPIME) 2 g in 0.9% sodium chloride (MBP/ADV) 100 mL  2 g IntraVENous Q12H  
 dilTIAZem (CARDIZEM) IR tablet 60 mg  60 mg Oral TIDAC  insulin lispro (HUMALOG) injection 5 Units  5 Units SubCUTAneous TIDAC  WARFARIN INFORMATION NOTE (COUMADIN)   Other QPM  
 sodium bicarbonate tablet 650 mg  650 mg Oral TID  sodium chloride (NS) flush 5-10 mL  5-10 mL IntraVENous PRN  
 vancomycin (VANCOCIN) 1,250 mg in 0.9% sodium chloride 250 mL IVPB  1,250 mg IntraVENous Q24H  
 busPIRone (BUSPAR) tablet 10 mg  10 mg Oral DAILY  cholecalciferol (VITAMIN D3) (1000 Units /25 mcg) tablet 5 Tab  5,000 Units Oral DAILY  cyanocobalamin (VITAMIN B12) tablet 1,000 mcg  1,000 mcg Oral DAILY  doxazosin (CARDURA) tablet 4 mg  4 mg Oral DAILY  hydrALAZINE (APRESOLINE) tablet 100 mg  100 mg Oral Q8H  
 insulin glargine (LANTUS) injection 20 Units  20 Units SubCUTAneous DAILY  metoprolol succinate (TOPROL-XL) XL tablet 100 mg  100 mg Oral DAILY  potassium chloride SR (KLOR-CON 10) tablet 10 mEq  10 mEq Oral DAILY  sertraline (ZOLOFT) tablet 50 mg  50 mg Oral DAILY  sodium chloride (NS) flush 5-40 mL  5-40 mL IntraVENous Q8H  
  sodium chloride (NS) flush 5-40 mL  5-40 mL IntraVENous PRN  
 acetaminophen (TYLENOL) tablet 650 mg  650 mg Oral Q4H PRN  
 HYDROmorphone (PF) (DILAUDID) injection 0.5 mg  0.5 mg IntraVENous Q4H PRN  
 naloxone (NARCAN) injection 0.4 mg  0.4 mg IntraVENous PRN  
 glucose chewable tablet 16 g  4 Tab Oral PRN  
 dextrose (D50W) injection syrg 12.5-25 g  25-50 mL IntraVENous PRN  
 glucagon (GLUCAGEN) injection 1 mg  1 mg IntraMUSCular PRN  
 insulin lispro (HUMALOG) injection   SubCUTAneous AC&HS Objective:  
  
Visit Vitals /60 (BP 1 Location: Left arm, BP Patient Position: At rest) Pulse 88 Temp 97.4 °F (36.3 °C) Resp 18 Ht 5' 9\" (1.753 m) Wt 248 lb (112.5 kg) SpO2 96% BMI 36.62 kg/m² Physical Exam: Abdomen: soft, non-tender Extremities: bilat lower edema, erythema (unchanged). Heart: irreg irreg Lungs: clear to auscultation bilaterally Pulses: 2+ and symmetric Data Review:  
Labs:   
Recent Labs 03/09/20 
0430 03/08/20 
1025 WBC 12.5* 16.0* HGB 8.8* 9.8* HCT 28.7* 31.1*  
 290 Recent Labs  
  03/10/20 
0158 03/09/20 
0430 03/08/20 
1127 03/08/20 
1125 * 136  --  135* K 3.8 4.3  --  4.5  
 110*  --  108 CO2 18* 17*  --  16* * 155*  --  231* BUN 25* 27*  --  30* CREA 1.88* 1.92*  --  2.17* CA 9.0 8.5  --  8.8 ALB  --   --   --  2.8* TBILI  --   --   --  0.5 SGOT  --   --   --  22 ALT  --   --   --  16 INR 2.3* 3.7* 2.9*  --   
 
 
Recent Labs 03/08/20 
1909 03/08/20 
1125 TROIQ 0.40* 0.45* Intake/Output Summary (Last 24 hours) at 3/10/2020 1036 Last data filed at 3/10/2020 1291 Gross per 24 hour Intake 639 ml Output 500 ml Net 139 ml Telemetry: rate controlled AF, 80-90s Echo: 3/9/2020 · Normal cavity size and systolic function (ejection fraction normal). Moderate concentric hypertrophy. Estimated left ventricular ejection fraction is 55 - 60%. · Dilated left atrium. · Dilated right ventricle. Mildly reduced systolic function. · Mitral valve thickening. Moderate mitral valve regurgitation is present. · Severely elevated central venous pressure (15+ mmHg); IVC diameter is larger than 21 mm and collapses less than 50% with respiration. Assessment:  
 
Active Problems: 
  Atrial fibrillation with rapid ventricular response ([address] (3/8/2020) Left leg cellulitis (3/8/2020) Elevated troponin (3/8/2020) Atrial fibrillation with RVR ([address] (3/8/2020) Plan:  
 
Anatoliy Riggins is a 60year old female with permanent A, HTN, CKD, AF, DM who presented with bilat leg ulcers, pain in AF RVR. Trop was elevated, trending down. She is rate controlled and without any cardiac complaints, on warfarin and po dilt/bb. Discussed medication compliance. Preserved EF per echo yesterday. Negative leg duplex. Cardiology will sign off. Call with ?TOBIAS Braswell MD, [facility] 
3/10/2020 
10:36 AM 
 
 
Patient seen and examined by me with nurse practitioner. I personally performed all components of the history, physical, and medical decision making and agree with the assessment and plan with minor modifications as noted. Rate controlled on med rx. Cont oac. Cont dilt/bb. Will sign off Roland Ramirez MD, [facility]

## 2020-03-10 NOTE — PROGRESS NOTES
Bedside and Verbal shift change report given to Bowen Powell (oncoming nurse) by Kaveh CONDE RN (offgoing nurse). Report included the following information SBAR, Kardex, ED Summary, Intake/Output, MAR, Recent Results, Cardiac Rhythm Afib and Quality Measures. 0715: Pt resting comfortably in bed with no complaints of pain. VSS. MEWS Score 1.  
 
0945: Pt PIV infiltrated. Vascular team notified for ultrasound guided IV placement. 1010: Vascular Access team at bedside. 1020: Vanc trough sent to lab. Pt NPO at this time. 1100: Pt resting quietly in bed listening to music. VSS. MEWS Score 1.  
 
1500: Pt sitting on edge of bed on phone. VSS. MEWS Score 1. 
 
1530: Pt INR and PTT sent to the lab.  
 
1630: Preop report given to [facility]. Consent signed and placed on the chart. 1720: Pt off th floor to OR.  
 
1900: Bedside and Verbal shift change report given to Kaveh Patino RN (oncoming nurse) by Bowen Powell (offgoing nurse). Report included the following information SBAR, Kardex, Intake/Output, MAR, Recent Results, Cardiac Rhythm Afib and Quality Measures.

## 2020-03-10 NOTE — PROGRESS NOTES
Hospitalist Progress Note NAME: Tomasa Tadeo :  1960 MRN:  [id number] Assessment / Plan: 
Atrial fibrillation/RVR 
- s/p Cardizem bolus and remains on Cardizem gtt with good rate control -- now transitioning to oral diltiazem 
- Continue metoprolol 
- Continue coumadin; pharmacy consult for dosing 
- supra therapeutic resolved  INR 2.3 Today before surgery needs to be 1.5- 2, recheck today pending ordered for 3 pm 
- Cardiology consult reviewed and appreciated  
  
Left leg cellulitis and ulceration of the left foot 
- Continue with empiric Vancomycin and Cefepime - Blood cultures with no growth to date - CT leg noted - Seen in consultation by Dr. Clifton Enciso with tentative plan for surgery tomorrow (will need to discuss INR with him) - Venous US LLE negative for DVT  
- Pain control prn - Elevated limb as needed Addendum: spoke with Dr. Clifton Enciso, will give vitamin K today, goal INR 1.5-2 for surgery. 
  
Elevated troponin - Likely due to demand with rapid atrial fibrillation - Downtrending  
  
CKD4 - Appears to be stable at baseline creatinine - Renally dose medications  
- sodium bicarbonate 650 mg bid - Nephrology consulted appreciate recommendations 
  
HTN 
- Continue home medications: hydralazine, clonidine,Toprol, Cardura   
  
DM - Lantus 20 units every day  
- ISS 
- Diabetes management recommendations noted -- added Lispro 5 units QAC per recommendations 
  
HLD 
  
Anxiety  
- Continue Zoloft 
  
CHF 
- No exacerbation - On  Bumex  
  
SSS 
- Pacemaker Morbid obesity due to BMI >35 with DM, HTN / Body mass index is 36.62 kg/m². Code status: Full Prophylaxis: anticoagulated with warfarin Recommended Disposition: Home w/Family Subjective: Chief Complaint / Reason for Physician Visit: follow up rapid atrial fibrillation and cellulitis, foot ulcers Feeling okay this morning except for pain in her left leg and foot.  No chest pain, palpitations, shortness of breath. No nausea or vomiting. Review of Systems: 
Symptom Y/N Comments  Symptom Y/N Comments Fever/Chills n   Chest Pain n   
Poor Appetite    Edema y Cough n   Abdominal Pain n   
Sputum    Joint Pain SOB/CHOW n   Pruritis/Rash Nausea/vomit n   Tolerating PT/OT Diarrhea n   Tolerating Diet Constipation n   Other Could NOT obtain due to:   
 
Objective: VITALS:  
Last 24hrs VS reviewed since prior progress note. Most recent are: 
Patient Vitals for the past 24 hrs: 
 Temp Pulse Resp BP SpO2  
03/10/20 1100 97.7 °F (36.5 °C) 79 18 136/55 99 % 03/10/20 0800  88     
03/10/20 0716 97.4 °F (36.3 °C) 88 18 144/60 96 % 03/10/20 0416 97.7 °F (36.5 °C) 86 16 127/41 96 % 03/09/20 2309 98 °F (36.7 °C) 86 18 141/58 97 % 03/09/20 2154    (!) 127/96   
03/09/20 1951 98.1 °F (36.7 °C) 78 16 121/53 96 % 03/09/20 1600  86     
03/09/20 1528 98.5 °F (36.9 °C) 83 18 107/45 98 % 03/09/20 1349  91  134/61 98 % Intake/Output Summary (Last 24 hours) at 3/10/2020 1242 Last data filed at 3/10/2020 2912 Gross per 24 hour Intake 639 ml Output 500 ml Net 139 ml PHYSICAL EXAM: 
General: WD, WN. Alert, cooperative, no acute distress   
EENT:  EOMI. Anicteric sclerae. MMM Resp:  CTA bilaterally, no wheezing or rales. No accessory muscle use CV:  Irregularly irregular  rhythm, +LLE edema 1-2+ GI:  Soft, Non distended, Non tender.  +Bowel sounds Neurologic:  Alert and oriented X 3, normal speech, Psych:   Good insight. Not anxious nor agitated Skin:  No jaundice. Erythema of distal left lower extremity and foot, with heat and tenderness. Reviewed most current lab test results and cultures  YES Reviewed most current radiology test results   YES Review and summation of old records today    NO Reviewed patient's current orders and MAR    YES 
PMH/SH reviewed - no change compared to H&P 
 ________________________________________________________________________ Care Plan discussed with: 
  Comments Patient x Family RN Care Manager Consultant     
                 x Multidiciplinary team rounds were held today with , nursing, pharmacist and clinical coordinator. Patient's plan of care was discussed; medications were reviewed and discharge planning was addressed. ________________________________________________________________________ Total NON critical care TIME:  25   Minutes Total CRITICAL CARE TIME Spent:   Minutes non procedure based Comments >50% of visit spent in counseling and coordination of care    
________________________________________________________________________ Kirsty Albert MD  
 
Procedures: see electronic medical records for all procedures/Xrays and details which were not copied into this note but were reviewed prior to creation of Plan. LABS: 
I reviewed today's most current labs and imaging studies. Pertinent labs include: 
Recent Labs 03/09/20 
0430 03/08/20 
1025 WBC 12.5* 16.0* HGB 8.8* 9.8* HCT 28.7* 31.1*  
 290 Recent Labs  
  03/10/20 
0158 03/09/20 
0430 03/08/20 
1127 03/08/20 
1125 * 136  --  135* K 3.8 4.3  --  4.5  
 110*  --  108 CO2 18* 17*  --  16* * 155*  --  231* BUN 25* 27*  --  30* CREA 1.88* 1.92*  --  2.17* CA 9.0 8.5  --  8.8 ALB  --   --   --  2.8* TBILI  --   --   --  0.5 SGOT  --   --   --  22 ALT  --   --   --  16 INR 2.3* 3.7* 2.9*  --   
 
 
Signed: Kirsty Albert MD

## 2020-03-10 NOTE — BRIEF OP NOTE
BRIEF OPERATIVE NOTE Date of Procedure: 3/10/2020 Preoperative Diagnosis: 1. LEFT FOOT DIABETIC ULCER, 2. LEFT FOOT DIABETIC INFECTION Postoperative Diagnosis: 1. LEFT FOOT DIABETIC ULCER, 2. LEFT FOOT DIABETIC INFECTION Procedure(s):1. DEBRIDEMENT LEFT FOOT, 2. BONE BIOPSY LEFT FIFTH METATARSAL Surgeon(s) and Role: * Masood Cordon DPM - Primary Surgical Assistant: n/a Surgical Staff: 
Circ-1: Prema Jonas Scrub Tech-1: Daisha Hilliard Scrub Tech-Relief: Campbell Claudio Event Time In Time Out Incision Start 1911 Incision Close 1942 Anesthesia: MAC Estimated Blood Loss: 30 ml Specimens:  
ID Type Source Tests Collected by Time Destination 1 : Osteomeylitis left fifth metatarsal Preservative Foot, left  [place], [place] 3/10/2020 1931 Pathology 1 : Left foot abscess Abscess Foot, left ANAEROBIC/AEROBIC/GRAM STAIN [place], [place] 3/10/2020 1914 Microbiology Findings: Abscess lateral left foot, at 5th metatarsal base Complications: none Implants: * No implants in log *

## 2020-03-10 NOTE — PROGRESS NOTES
Problem: Falls - Risk of 
Goal: *Absence of Falls Description Document Albino Messing Fall Risk and appropriate interventions in the flowsheet. Outcome: Progressing Towards Goal 
Note: Fall Risk Interventions: 
Mobility Interventions: Bed/chair exit alarm, Communicate number of staff needed for ambulation/transfer, OT consult for ADLs, Patient to call before getting OOB, PT Consult for mobility concerns, PT Consult for assist device competence, Strengthening exercises (ROM-active/passive) Medication Interventions: Bed/chair exit alarm, Patient to call before getting OOB, Teach patient to arise slowly Elimination Interventions: Bed/chair exit alarm, Call light in reach, Patient to call for help with toileting needs, Stay With Me (per policy), Toileting schedule/hourly rounds Problem: Patient Education: Go to Patient Education Activity Goal: Patient/Family Education Outcome: Progressing Towards Goal 
  
Problem: Pressure Injury - Risk of 
Goal: *Prevention of pressure injury Description Document Valdemar Scale and appropriate interventions in the flowsheet. Outcome: Progressing Towards Goal 
Note: Pressure Injury Interventions: 
Sensory Interventions: Float heels, Keep linens dry and wrinkle-free, Maintain/enhance activity level, Minimize linen layers, Pressure redistribution bed/mattress (bed type) Moisture Interventions: Contain wound drainage Activity Interventions: Increase time out of bed, Pressure redistribution bed/mattress(bed type), PT/OT evaluation Mobility Interventions: Pressure redistribution bed/mattress (bed type), HOB 30 degrees or less, PT/OT evaluation Nutrition Interventions: Offer support with meals,snacks and hydration, Document food/fluid/supplement intake Problem: Patient Education: Go to Patient Education Activity Goal: Patient/Family Education Outcome: Progressing Towards Goal 
  
Problem: Diabetes Self-Management Goal: *Disease process and treatment process Description Define diabetes and identify own type of diabetes; list 3 options for treating diabetes. Outcome: Progressing Towards Goal 
Goal: *Incorporating physical activity into lifestyle Description State effect of exercise on blood glucose levels. Outcome: Progressing Towards Goal 
Goal: *Developing strategies to promote health/change behavior Description Define the ABC's of diabetes; identify appropriate screenings, schedule and personal plan for screenings. Outcome: Progressing Towards Goal 
Goal: *Using medications safely Description State effect of diabetes medications on diabetes; name diabetes medication taking, action and side effects. Outcome: Progressing Towards Goal 
Goal: *Monitoring blood glucose, interpreting and using results Description Identify recommended blood glucose targets  and personal targets. Outcome: Progressing Towards Goal 
Goal: *Prevention, detection, treatment of acute complications Description List symptoms of hyper- and hypoglycemia; describe how to treat low blood sugar and actions for lowering  high blood glucose level. Outcome: Progressing Towards Goal 
Goal: *Prevention, detection and treatment of chronic complications Description Define the natural course of diabetes and describe the relationship of blood glucose levels to long term complications of diabetes. Outcome: Progressing Towards Goal 
Goal: *Developing strategies to address psychosocial issues Description Describe feelings about living with diabetes; identify support needed and support network Outcome: Progressing Towards Goal 
Goal: *Insulin pump training Outcome: Progressing Towards Goal 
Goal: *Sick day guidelines Outcome: Progressing Towards Goal 
Goal: *Patient Specific Goal (EDIT GOAL, INSERT TEXT) Outcome: Progressing Towards Goal 
  
Problem: Patient Education: Go to Patient Education Activity Goal: Patient/Family Education Outcome: Progressing Towards Goal 
  
Problem: Hypertension Goal: *Blood pressure within specified parameters Outcome: Progressing Towards Goal 
Goal: *Fluid volume balance Outcome: Progressing Towards Goal 
Goal: *Labs within defined limits Outcome: Progressing Towards Goal 
  
Problem: Patient Education: Go to Patient Education Activity Goal: Patient/Family Education Outcome: Progressing Towards Goal 
  
Problem: Chronic Renal Failure Goal: *Fluid and electrolytes stabilized Outcome: Progressing Towards Goal 
  
Problem: Patient Education: Go to Patient Education Activity Goal: Patient/Family Education Outcome: Progressing Towards Goal 
  
Problem: Pain Goal: *Control of Pain Outcome: Progressing Towards Goal 
Goal: *PALLIATIVE CARE:  Alleviation of Pain Outcome: Progressing Towards Goal 
  
Problem: Patient Education: Go to Patient Education Activity Goal: Patient/Family Education Outcome: Progressing Towards Goal

## 2020-03-10 NOTE — PROGRESS NOTES
Pharmacy Automatic Renal Dosing Protocol - Antimicrobials Indication for Antimicrobials: skin and soft tissue infection - L leg; possible osteomyelitis, h/o chronic foot ulcers Current Regimen of Each Antimicrobial:  
Vancomycin 2000 mg x1, then 1250 q 24 hours ; start 3/8; day 3 Cefepime 2g IV q 12 hours (; day #3 Previous Antimicrobial Therapy: N/a 
 
Goal Level: VANCOMYCIN TROUGH GOAL RANGE Vancomycin Trough: 15 - 20 mcg/mL  (AUC: 400 - 600 mg/hr/Liter/day) Date Dose & Interval Measured (mcg/mL) Extrapolated (mcg/mL) 3/10 1250 mg Q24H 19.6 22.2 Date & time of next level: 2/10 at 11 AM 
 
Significant Cultures:  
3/8 blood cx pending Radiology / Imaging results: (X-ray, CT scan or MRI):  
3/8 L foot XR IMPRESSION: No signal change. Plantar soft tissue ulcers. No apparent osteomyelitis. 3/8 LLE CT (prelim) Unenhanced CT Left Foot show no bone erosion, periostitis or other radiographic 
features of overt osteomyelitis. There is plantar soft tissue emphysema. Paralysis, amputations, malnutrition: none noted Labs: 
Recent Labs  
  03/10/20 
0158 20 
0430 20 
1125 20 
1025 CREA 1.88* 1.92* 2.17*  --   
BUN 25* 27* 30*  --   
WBC  --  12.5*  --  16.0* Temp (24hrs), Av °F (36.7 °C), Min:97.4 °F (36.3 °C), Max:98.6 °F (37 °C) Creatinine Clearance (mL/min) or Dialysis: 33.3 mL/min (IBW) Impression/Plan:  
Scr trending down WBC trending down Afebrile Vancomycin trough 22.2 at steady state on 1250 mg Q24H. Dose adjusted to 1000 mg Q24H with anticipated trough of 17.8 mcg/ml. Will adjust the Cefepime to q 12 hours Vancomycin trough on 3/10 at 11 AM 
Antimicrobial stop date pending Pharmacy will follow daily and adjust medications as appropriate for renal function and/or serum levels.  
 
Thank you, 
Jaron Rick, [facility]

## 2020-03-10 NOTE — PROGRESS NOTES
Bedside shift change report given to Margo Arias RN (oncoming nurse) by Isidro Patino RN (offgoing nurse). Report included the following information SBAR, Kardex, Intake/Output, MAR, Recent Results and Cardiac Rhythm a fib. 
 
1950: Pt lying in bed, A&Ox4. A fib HR 80s, RA. Lungs clear, BS active. Dsg to R foot c/d/i; dsg to L foot d/i c small amount of dried brown drainage. Pt reports pain is tolerable at this time. Denies nausea. Call bell within reach. 2145: Applied gripper socks. Pt transfers to [facility] s difficulty. Urine sample sent to lab. 
 
0615: CHG wipes and gown provided; pt to bathe herself this AM. 0699: Bedside shift change report given to Isidro Patino RN (oncoming nurse) by Margo Arias RN (offgoing nurse). Report included the following information SBAR, Kardex, Intake/Output, MAR, Recent Results and Cardiac Rhythm a fib.

## 2020-03-10 NOTE — ROUTINE PROCESS
TRANSFER - IN REPORT: 
 
Verbal report received from REYES Dailey RN(name) on Tomas Juarez  being received from OR(unit) for routine post - op Report consisted of patients Situation, Background, Assessment and  
Recommendations(SBAR). Information from the following report(s) OR Summary was reviewed with the receiving nurse. Opportunity for questions and clarification was provided. Assessment completed upon patients arrival to unit and care assumed.

## 2020-03-10 NOTE — PROGRESS NOTES
Verbal shift change report given to Tiago Gamble RN (oncoming nurse) by Sienna Navarro RN (offgoing nurse). Report included the following information SBAR, Kardex, Intake/Output, MAR, Recent Results and Cardiac Rhythm NSR. Pt in [facility] [address]  
 
2017:  TRANSFER - IN REPORT: 
 
Verbal report received from Celio(name) on Fatou Rashid  being received from [employer]) for routine post - op Report consisted of patients Situation, Background, Assessment and  
Recommendations(SBAR). Information from the following report(s) SBAR, OR Summary, Procedure Summary, Intake/Output, MAR and Cardiac Rhythm a fib was reviewed with the receiving nurse. Opportunity for questions and clarification was provided. Assessment to be completed upon patients arrival to unit and care assumed at that time. 2036: Pt arrives from PACU NAD. A&Ox4. VSS. A fib, RA. Denies pain. Provided ice chips, ginger ale. L foot dsg c/d/i. Cap refill less than 3 sec, toes warm. Some sensation present. Elevating LLE.   
 
2105: Applied purewick. [id number]: The documentation for this period is being entered following the guidelines as defined in the [facility] downtime policy by Desiree Lyle RN. 
 
0400: Pt denies pain. Neurovasc assessment of LLE unchanged. 0730: Bedside shift change report given to [address] Emeka Mukherjee (oncoming nurse) by Tiago Gamble RN (offgoing nurse). Report included the following information SBAR, Kardex, Intake/Output, MAR, Recent Results and Cardiac Rhythm a fib.

## 2020-03-11 LAB
ANA SER QL: POSITIVE
ANION GAP SERPL CALC-SCNC: 9 MMOL/L (ref 5–15)
BASOPHILS # BLD: 0 K/UL (ref 0–0.1)
BASOPHILS NFR BLD: 0 % (ref 0–1)
BUN SERPL-MCNC: 26 MG/DL (ref 6–20)
BUN/CREAT SERPL: 14 (ref 12–20)
C3 SERPL-MCNC: 107 MG/DL (ref 82–167)
C4 SERPL-MCNC: 27 MG/DL (ref 14–44)
CALCIUM SERPL-MCNC: 8.6 MG/DL (ref 8.5–10.1)
CENTROMERE B AB SER-ACNC: <0.2 AI (ref 0–0.9)
CHLORIDE SERPL-SCNC: 111 MMOL/L (ref 97–108)
CHROMATIN AB SERPL-ACNC: 0.7 AI (ref 0–0.9)
CO2 SERPL-SCNC: 18 MMOL/L (ref 21–32)
CREAT SERPL-MCNC: 1.91 MG/DL (ref 0.55–1.02)
DIFFERENTIAL METHOD BLD: ABNORMAL
DSDNA AB SER-ACNC: 1 IU/ML (ref 0–9)
ENA JO1 AB SER-ACNC: <0.2 AI (ref 0–0.9)
ENA RNP AB SER-ACNC: <0.2 AI (ref 0–0.9)
ENA SCL70 AB SER-ACNC: 0.5 AI (ref 0–0.9)
ENA SM AB SER-ACNC: 1.3 AI (ref 0–0.9)
ENA SS-A AB SER-ACNC: <0.2 AI (ref 0–0.9)
ENA SS-B AB SER-ACNC: <0.2 AI (ref 0–0.9)
EOSINOPHIL # BLD: 0 K/UL (ref 0–0.4)
EOSINOPHIL NFR BLD: 0 % (ref 0–7)
ERYTHROCYTE [DISTWIDTH] IN BLOOD BY AUTOMATED COUNT: 15.7 % (ref 11.5–14.5)
GLUCOSE BLD STRIP.AUTO-MCNC: 105 MG/DL (ref 65–100)
GLUCOSE BLD STRIP.AUTO-MCNC: 117 MG/DL (ref 65–100)
GLUCOSE BLD STRIP.AUTO-MCNC: 130 MG/DL (ref 65–100)
GLUCOSE BLD STRIP.AUTO-MCNC: 175 MG/DL (ref 65–100)
GLUCOSE SERPL-MCNC: 133 MG/DL (ref 65–100)
HCT VFR BLD AUTO: 26.7 % (ref 35–47)
HGB BLD-MCNC: 8.5 G/DL (ref 11.5–16)
IMM GRANULOCYTES # BLD AUTO: 0.1 K/UL (ref 0–0.04)
IMM GRANULOCYTES NFR BLD AUTO: 1 % (ref 0–0.5)
INR PPP: 1.3 (ref 0.9–1.1)
IRON SATN MFR SERPL: 10 % (ref 20–50)
IRON SERPL-MCNC: 18 UG/DL (ref 35–150)
LYMPHOCYTES # BLD: 0.8 K/UL (ref 0.8–3.5)
LYMPHOCYTES NFR BLD: 6 % (ref 12–49)
MCH RBC QN AUTO: 28.9 PG (ref 26–34)
MCHC RBC AUTO-ENTMCNC: 31.8 G/DL (ref 30–36.5)
MCV RBC AUTO: 90.8 FL (ref 80–99)
MONOCYTES # BLD: 1.1 K/UL (ref 0–1)
MONOCYTES NFR BLD: 8 % (ref 5–13)
NEUTS SEG # BLD: 11.5 K/UL (ref 1.8–8)
NEUTS SEG NFR BLD: 85 % (ref 32–75)
NRBC # BLD: 0 K/UL (ref 0–0.01)
NRBC BLD-RTO: 0 PER 100 WBC
PLATELET # BLD AUTO: 361 K/UL (ref 150–400)
PMV BLD AUTO: 10.6 FL (ref 8.9–12.9)
POTASSIUM SERPL-SCNC: 3.6 MMOL/L (ref 3.5–5.1)
PROTHROMBIN TIME: 13.1 SEC (ref 9–11.1)
RBC # BLD AUTO: 2.94 M/UL (ref 3.8–5.2)
RBC MORPH BLD: ABNORMAL
SEE BELOW, 164869: ABNORMAL
SERVICE CMNT-IMP: ABNORMAL
SODIUM SERPL-SCNC: 138 MMOL/L (ref 136–145)
TIBC SERPL-MCNC: 183 UG/DL (ref 250–450)
WBC # BLD AUTO: 13.5 K/UL (ref 3.6–11)
WBC MORPH BLD: ABNORMAL

## 2020-03-11 PROCEDURE — 36415 COLL VENOUS BLD VENIPUNCTURE: CPT

## 2020-03-11 PROCEDURE — 74011250636 HC RX REV CODE- 250/636: Performed by: INTERNAL MEDICINE

## 2020-03-11 PROCEDURE — 74011250636 HC RX REV CODE- 250/636: Performed by: PODIATRIST

## 2020-03-11 PROCEDURE — 74011636637 HC RX REV CODE- 636/637: Performed by: PODIATRIST

## 2020-03-11 PROCEDURE — 74011000258 HC RX REV CODE- 258: Performed by: PODIATRIST

## 2020-03-11 PROCEDURE — 74011250637 HC RX REV CODE- 250/637: Performed by: PODIATRIST

## 2020-03-11 PROCEDURE — 65660000000 HC RM CCU STEPDOWN

## 2020-03-11 PROCEDURE — 85025 COMPLETE CBC W/AUTO DIFF WBC: CPT

## 2020-03-11 PROCEDURE — 77030018836 HC SOL IRR NACL ICUM -A

## 2020-03-11 PROCEDURE — 74011250637 HC RX REV CODE- 250/637: Performed by: FAMILY MEDICINE

## 2020-03-11 PROCEDURE — 83540 ASSAY OF IRON: CPT

## 2020-03-11 PROCEDURE — 80048 BASIC METABOLIC PNL TOTAL CA: CPT

## 2020-03-11 PROCEDURE — 82962 GLUCOSE BLOOD TEST: CPT

## 2020-03-11 PROCEDURE — 74011000250 HC RX REV CODE- 250: Performed by: PODIATRIST

## 2020-03-11 PROCEDURE — 85610 PROTHROMBIN TIME: CPT

## 2020-03-11 PROCEDURE — 74011250637 HC RX REV CODE- 250/637: Performed by: INTERNAL MEDICINE

## 2020-03-11 RX ORDER — HYDROCODONE BITARTRATE AND ACETAMINOPHEN 5; 325 MG/1; MG/1
1 TABLET ORAL
Status: DISCONTINUED | OUTPATIENT
Start: 2020-03-11 | End: 2020-03-22 | Stop reason: HOSPADM

## 2020-03-11 RX ORDER — POTASSIUM CHLORIDE 750 MG/1
40 TABLET, FILM COATED, EXTENDED RELEASE ORAL
Status: COMPLETED | OUTPATIENT
Start: 2020-03-11 | End: 2020-03-11

## 2020-03-11 RX ORDER — DOCUSATE SODIUM 100 MG/1
100 CAPSULE, LIQUID FILLED ORAL 2 TIMES DAILY
Status: DISCONTINUED | OUTPATIENT
Start: 2020-03-11 | End: 2020-03-22 | Stop reason: HOSPADM

## 2020-03-11 RX ORDER — WARFARIN SODIUM 5 MG/1
5 TABLET ORAL ONCE
Status: COMPLETED | OUTPATIENT
Start: 2020-03-11 | End: 2020-03-11

## 2020-03-11 RX ADMIN — HYDROMORPHONE HYDROCHLORIDE 0.5 MG: 1 INJECTION, SOLUTION INTRAMUSCULAR; INTRAVENOUS; SUBCUTANEOUS at 09:41

## 2020-03-11 RX ADMIN — BUMETANIDE 1 MG: 0.25 INJECTION INTRAMUSCULAR; INTRAVENOUS at 08:47

## 2020-03-11 RX ADMIN — HYDROCODONE BITARTRATE AND ACETAMINOPHEN 1 TABLET: 5; 325 TABLET ORAL at 13:43

## 2020-03-11 RX ADMIN — HYDROMORPHONE HYDROCHLORIDE 0.5 MG: 1 INJECTION, SOLUTION INTRAMUSCULAR; INTRAVENOUS; SUBCUTANEOUS at 21:22

## 2020-03-11 RX ADMIN — DOCUSATE SODIUM 100 MG: 100 CAPSULE, LIQUID FILLED ORAL at 12:07

## 2020-03-11 RX ADMIN — DOXAZOSIN 4 MG: 2 TABLET ORAL at 08:46

## 2020-03-11 RX ADMIN — CYANOCOBALAMIN TAB 500 MCG 1000 MCG: 500 TAB at 08:46

## 2020-03-11 RX ADMIN — HYDRALAZINE HYDROCHLORIDE 100 MG: 50 TABLET, FILM COATED ORAL at 21:21

## 2020-03-11 RX ADMIN — SODIUM BICARBONATE 650 MG: 650 TABLET ORAL at 21:21

## 2020-03-11 RX ADMIN — MELATONIN 5 TABLET: at 08:46

## 2020-03-11 RX ADMIN — INSULIN GLARGINE 20 UNITS: 100 INJECTION, SOLUTION SUBCUTANEOUS at 08:46

## 2020-03-11 RX ADMIN — CEFEPIME HYDROCHLORIDE 2 G: 2 INJECTION, POWDER, FOR SOLUTION INTRAVENOUS at 21:19

## 2020-03-11 RX ADMIN — DILTIAZEM HYDROCHLORIDE 60 MG: 60 TABLET, FILM COATED ORAL at 12:02

## 2020-03-11 RX ADMIN — Medication 10 ML: at 05:45

## 2020-03-11 RX ADMIN — Medication 10 ML: at 21:26

## 2020-03-11 RX ADMIN — HYDRALAZINE HYDROCHLORIDE 100 MG: 50 TABLET, FILM COATED ORAL at 13:43

## 2020-03-11 RX ADMIN — INSULIN LISPRO 2 UNITS: 100 INJECTION, SOLUTION INTRAVENOUS; SUBCUTANEOUS at 08:47

## 2020-03-11 RX ADMIN — SERTRALINE HYDROCHLORIDE 50 MG: 50 TABLET ORAL at 08:46

## 2020-03-11 RX ADMIN — POTASSIUM CHLORIDE 10 MEQ: 750 TABLET, FILM COATED, EXTENDED RELEASE ORAL at 08:46

## 2020-03-11 RX ADMIN — HYDROMORPHONE HYDROCHLORIDE 0.5 MG: 1 INJECTION, SOLUTION INTRAMUSCULAR; INTRAVENOUS; SUBCUTANEOUS at 05:45

## 2020-03-11 RX ADMIN — SODIUM BICARBONATE 650 MG: 650 TABLET ORAL at 08:45

## 2020-03-11 RX ADMIN — POTASSIUM CHLORIDE 40 MEQ: 750 TABLET, FILM COATED, EXTENDED RELEASE ORAL at 12:02

## 2020-03-11 RX ADMIN — Medication 10 ML: at 16:06

## 2020-03-11 RX ADMIN — DOCUSATE SODIUM 100 MG: 100 CAPSULE, LIQUID FILLED ORAL at 17:33

## 2020-03-11 RX ADMIN — WARFARIN SODIUM 5 MG: 5 TABLET ORAL at 17:33

## 2020-03-11 RX ADMIN — HYDRALAZINE HYDROCHLORIDE 100 MG: 50 TABLET, FILM COATED ORAL at 05:45

## 2020-03-11 RX ADMIN — INSULIN LISPRO 5 UNITS: 100 INJECTION, SOLUTION INTRAVENOUS; SUBCUTANEOUS at 12:02

## 2020-03-11 RX ADMIN — IRON SUCROSE 200 MG: 20 INJECTION, SOLUTION INTRAVENOUS at 12:02

## 2020-03-11 RX ADMIN — METOPROLOL SUCCINATE 100 MG: 50 TABLET, EXTENDED RELEASE ORAL at 08:46

## 2020-03-11 RX ADMIN — CEFEPIME HYDROCHLORIDE 2 G: 2 INJECTION, POWDER, FOR SOLUTION INTRAVENOUS at 09:41

## 2020-03-11 RX ADMIN — DILTIAZEM HYDROCHLORIDE 60 MG: 60 TABLET, FILM COATED ORAL at 08:46

## 2020-03-11 RX ADMIN — VANCOMYCIN HYDROCHLORIDE 1000 MG: 1 INJECTION, POWDER, LYOPHILIZED, FOR SOLUTION INTRAVENOUS at 12:06

## 2020-03-11 RX ADMIN — BUSPIRONE HYDROCHLORIDE 10 MG: 10 TABLET ORAL at 08:45

## 2020-03-11 RX ADMIN — SODIUM BICARBONATE 650 MG: 650 TABLET ORAL at 16:05

## 2020-03-11 RX ADMIN — HYDROMORPHONE HYDROCHLORIDE 0.5 MG: 1 INJECTION, SOLUTION INTRAMUSCULAR; INTRAVENOUS; SUBCUTANEOUS at 16:05

## 2020-03-11 RX ADMIN — INSULIN LISPRO 5 UNITS: 100 INJECTION, SOLUTION INTRAVENOUS; SUBCUTANEOUS at 08:46

## 2020-03-11 RX ADMIN — DILTIAZEM HYDROCHLORIDE 60 MG: 60 TABLET, FILM COATED ORAL at 16:05

## 2020-03-11 RX ADMIN — INSULIN LISPRO 5 UNITS: 100 INJECTION, SOLUTION INTRAVENOUS; SUBCUTANEOUS at 17:33

## 2020-03-11 NOTE — PROGRESS NOTES
Pharmacy Dosing of Warfarin Indication: A. fib Goal INR: 2-3 PTA Warfarin Dose: 4 mg on Sun, Wed and 2 mg on all other days Concurrent anticoagulants: none Concurrent antiplatelet: none Major Interacting Medications Drugs that may increase INR: none Drugs that may decrease INR: Phytonadione 2.5 mg on 3/9 Conditions that may increase/decrease INR (CHF, C. diff, cirrhosis, thyroid disorder, hypoalbuminemia):  
 
Labs: 
Recent Labs  
  03/11/20 
0358 03/10/20 
1532 03/10/20 
0158 03/09/20 
0430 INR 1.3* 1.4* 2.3* 3.7* HGB 8.5*  --   --  8.8*   --   --  286 Impression/Plan:   
Pharmacy consulted to dose warfarin on 3/9 Warfarin held on 3/9 for supratherapeutic INR The patient had the foot debridement and discussed with MD and agreed to resume warfarin tonight Patient received Vitamin K 2.5 mg PO on 3/9 at around 6 PM. Warfarin 5 mg for tonight INR daily, CBC w/o diff QOD Thanks Anna Jordan PHARMD 
 
http://[facility]/[place]/[facility]/Pharmacy/Clinical%20Companion/Warfarin%20Dosing%20Protocol. pdf

## 2020-03-11 NOTE — PROGRESS NOTES
Podiatry Subjective: Pt relates pain left foot, pain medication helps but \"on the edge\" of not being enough. Patient is a 60 y.o.  female who is being seen for diabetic wound and infection left foot. POD #1. Patient Active Problem List  
 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 ([address] 03/03/2020  Traumatic hematoma of foot, left, initial encounter 02/25/2020  Type 2 diabetes mellitus with left diabetic foot infection ([address] 10/29/2019  Foot deformity, acquired, left 09/24/2019  Foot deformity, right 09/24/2019  Pes cavus 09/24/2019  Cellulitis of right lower extremity 08/09/2019  Diabetic ulcer of right midfoot associated with type 2 diabetes mellitus, with fat layer exposed ([address] 08/06/2019  Diabetic ulcer of left heel associated with type 2 diabetes mellitus, with fat layer exposed ([address] 06/21/2019  Open breast wound, left, initial encounter 06/07/2019  Venous stasis ulcer of right lower leg with edema of right lower leg (HCC) 11/14/2018  Diabetic polyneuropathy associated with type 2 diabetes mellitus ([address] 11/13/2018  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, associated with type 2 diabetes mellitus ([address] 04/25/2018  Morbid obesity with BMI of 40.0-44.9, adult ([address] 05/01/2017  Controlled type 2 diabetes mellitus with stage 4 chronic kidney disease, with long-term current use of insulin ([address] 01/16/2017  PAF (paroxysmal atrial fibrillation) ([address] 11/28/2016  Anemia in stage 4 chronic kidney disease ([address] 11/28/2016  Essential hypertension 08/26/2016  Systolic murmur 50/87/9613  CKD (chronic kidney disease), stage IV ([address] 06/09/2012  Mixed hyperlipidemia 05/22/2012  Long term (current) use of anticoagulants 04/11/2012  S/P cardiac pacemaker procedure 04/03/2012  Sick sinus syndrome ([facility] 04/02/2012  Status post ablation of atrial fibrillation 12/15/2011  Fe deficiency anemia 04/14/2010  
 History of breast cancer 04/13/2010  Asthma 04/13/2010 Past Medical History:  
Diagnosis Date  Acquired lymphedema Right arm  Arrhythmia  Asthma  Atrial fibrillation ([facility] Dr. José Miguel Alvarado and Dr. Neno Contreras [facility])  Cancer ([facility] bilateral breast  
 Chronic kidney disease  Diabetes mellitus type II   
 Dizziness  Essential hypertension  Hyperlipidemia  Hypertension  Long term (current) use of anticoagulants  Morbid obesity ([facility] 3/14/2012  Nausea & vomiting  Osteoarthritis  Pacemaker  Sick sinus syndrome ([facility] Past Surgical History:  
Procedure Laterality Date  ABDOMEN SURGERY PROC UNLISTED    
 gastric bypass  GASTRIC BYPASS,OBESE<150CM SAW-EN-Y  7/08  
 [facility]  HX AFIB ABLATION    
 HX CARPAL TUNNEL RELEASE [address] RIGHT MODIFIED RADICAL   
 HX MOHS PROCEDURES  1995  
 right  HX ORTHOPAEDIC Right   
 knee surgery  HX PACEMAKER    
 IR INSERT TUNL CVC W PORT OVER 5 YEARS    
 LAMINECTOMY,CERVICAL  1994  
 NEEDLE BIOPSY LIVER,W OTHR [address]  8.21.07  
 NC [address] AND 1994  NC TRABECULOPLASTY BY LASER SURGERY    
 US GUIDE ASP OVARIAN CYST ABD APPROACH  8.21.07  
 RIGHT Family History Problem Relation Age of Onset  Cancer Mother  Heart Disease Mother  Alcohol abuse Father  Diabetes Brother  Hypertension Brother Social History Tobacco Use  Smoking status: Never Smoker  Smokeless tobacco: Never Used Substance Use Topics  Alcohol use: Yes Comment: rare Allergies Allergen Reactions  Ace Inhibitors Cough  Amlodipine Swelling  Avapro [Irbesartan] Unable to Obtain  Bactrim [Sulfamethoxazole-Trimethoprim] Other (comments) Sore mouth  Captopril Cough  Carvedilol Diarrhea [address]  Morphine Nausea and Vomiting and Swelling  Penicillins Hives Tolerated cefepime 1/2020  Pravachol [Pravastatin] Nausea Only  Seafood [Shellfish Containing Products] Hives  Singulair [Montelukast] Other (comments)  
  palpitations Prior to Admission medications Medication Sig Start Date End Date Taking? Authorizing Provider  
warfarin (COUMADIN) 4 mg tablet Take 2 mg by mouth five (5) days a week. Take 1 tablet (4 mg) on Sun and Wed and a half tablet (2 mg) the other 5 days. Yes Provider, Historical  
warfarin (COUMADIN) 4 mg tablet Take 4 mg by mouth two (2) times a week. Take 1 tablet (4 mg) on Sun and Wed and a half tablet (2 mg) the other 5 days. Yes Provider, Historical  
acetaminophen 500 mg tab 500 mg, diphenhydrAMINE 25 mg cap 25 mg Take 2 Doses by mouth nightly. Provider, Historical  
potassium chloride SR (KLOR-CON 10) 10 mEq tablet Take 10 mEq by mouth daily. Indications: Patient states she intends to take this medication untl she discusses with Dr. Chetna Pimentel on 12-12-19. Provider, Historical  
insulin glargine (LANTUS U-100 INSULIN) 100 unit/mL injection Inject 20 units daily 11/10/19   Rg Patel MD  
hydrALAZINE (APRESOLINE) 100 mg tablet TAKE ONE TABLET BY MOUTH THREE TIMES A DAY 10/21/19   Rg Patel MD  
sertraline (ZOLOFT) 50 mg tablet TAKE ONE AND ONE-HALF (1 & 1/2) TABLET BY MOUTH DAILY 8/22/19   Rg Patel MD  
bumetanide (BUMEX) 1 mg tablet Take 2 mg by mouth daily. Provider, Historical  
cloNIDine HCl (CATAPRES) 0.3 mg tablet Take 0.6 mg by mouth nightly.     Provider, Historical  
metoprolol succinate (TOPROL-XL) 100 mg tablet TAKE TWO TABLETS BY MOUTH DAILY 19   Trisha Craft MD  
doxazosin (CARDURA) 4 mg tablet TAKE ONE TABLET BY MOUTH ONCE NIGHTLY 19   Trisha Craft MD  
busPIRone (BUSPAR) 10 mg tablet TAKE ONE TABLET BY MOUTH TWICE A DAY 19   Trisha Craft MD  
metolazone (ZAROXOLYN) 5 mg tablet Take 1 Tab by mouth daily as needed (take if develop increased LE edema, weight gain > 5 pounds. ). 4/10/14   Farheen Perry NP  
cholecalciferol, VITAMIN D3, (VITAMIN D3) 5,000 unit tab tablet Take 5,000 Units by mouth daily. Provider, Historical  
vitamin A 8,000 unit capsule Take 8,000 Units by mouth daily. Provider, Historical  
insulin lispro (HUMALOG) 100 unit/mL kwikpen 2 units with dinner for sugars over 200 1/3/14   Trisha Craft MD  
cyanocobalamin (VITAMIN B-12) 1,000 mcg sublingual tablet Take 1,000 mcg by mouth daily. Provider, Historical  
 
 
Review of Systems AO x3. NAD. Objective:  
 
Patient Vitals for the past 8 hrs: 
 BP Temp Pulse Resp SpO2  
20 1042 123/62 97.9 °F (36.6 °C) 77 16 98 % 20 0732 135/57 98.1 °F (36.7 °C) 92 16 100 % 20 0400 140/62 97.7 °F (36.5 °C) 84 16 99 % Temp (24hrs), Av.8 °F (36.6 °C), Min:97.2 °F (36.2 °C), Max:98.1 °F (36.7 °C) Physical Exam Lower Extremity Dressings removed left foot. Wound beds lateral and plantar foot are clean with light bleeding on bandage. Reduced erythema and edema left foot and ankle. DP and PT 2/4; CFT less than 3 seconds Sensation intact to light touch C&S: No growth so far Data Review:  
Recent Results (from the past 24 hour(s)) GLUCOSE, POC Collection Time: 03/10/20 11:06 AM  
Result Value Ref Range Glucose (POC) 131 (H) 65 - 100 mg/dL Performed by LOWERS ENRRIQUE  RN   
PROTHROMBIN TIME + INR Collection Time: 03/10/20  3:32 PM  
Result Value Ref Range INR 1.4 (H) 0.9 - 1.1 Prothrombin time 14.4 (H) 9.0 - 11.1 sec GLUCOSE, POC  Collection Time: 03/10/20  4:14 PM  
 Result Value Ref Range Glucose (POC) 159 (H) 65 - 100 mg/dL Performed by Yudi Rooney GLUCOSE, POC Collection Time: 03/10/20  5:55 PM  
Result Value Ref Range Glucose (POC) 143 (H) 65 - 100 mg/dL Performed by Erven Heimlich CULTURE, WOUND W GRAM STAIN Collection Time: 03/10/20  7:15 PM  
Result Value Ref Range Special Requests: NO SPECIAL REQUESTS    
 GRAM STAIN NO WBC'S SEEN    
 GRAM STAIN NO ORGANISMS SEEN Culture result: PENDING   
GLUCOSE, POC Collection Time: 03/10/20  7:58 PM  
Result Value Ref Range Glucose (POC) 129 (H) 65 - 100 mg/dL Performed by Volney Koyanagi GLUCOSE, POC Collection Time: 03/10/20  9:09 PM  
Result Value Ref Range Glucose (POC) 126 (H) 65 - 100 mg/dL Performed by Jeanne Lujan (CON) METABOLIC PANEL, BASIC Collection Time: 03/11/20  3:58 AM  
Result Value Ref Range Sodium 138 136 - 145 mmol/L Potassium 3.6 3.5 - 5.1 mmol/L Chloride 111 (H) 97 - 108 mmol/L  
 CO2 18 (L) 21 - 32 mmol/L Anion gap 9 5 - 15 mmol/L Glucose 133 (H) 65 - 100 mg/dL BUN 26 (H) 6 - 20 MG/DL Creatinine 1.91 (H) 0.55 - 1.02 MG/DL  
 BUN/Creatinine ratio 14 12 - 20 GFR est AA 32 (L) >60 ml/min/1.73m2 GFR est non-AA 27 (L) >60 ml/min/1.73m2 Calcium 8.6 8.5 - 10.1 MG/DL PROTHROMBIN TIME + INR Collection Time: 03/11/20  3:58 AM  
Result Value Ref Range INR 1.3 (H) 0.9 - 1.1 Prothrombin time 13.1 (H) 9.0 - 11.1 sec CBC WITH AUTOMATED DIFF Collection Time: 03/11/20  3:58 AM  
Result Value Ref Range WBC 13.5 (H) 3.6 - 11.0 K/uL  
 RBC 2.94 (L) 3.80 - 5.20 M/uL HGB 8.5 (L) 11.5 - 16.0 g/dL HCT 26.7 (L) 35.0 - 47.0 % MCV 90.8 80.0 - 99.0 FL  
 MCH 28.9 26.0 - 34.0 PG  
 MCHC 31.8 30.0 - 36.5 g/dL  
 RDW 15.7 (H) 11.5 - 14.5 % PLATELET 601 333 - 218 K/uL MPV 10.6 8.9 - 12.9 FL  
 NRBC 0.0 0  WBC ABSOLUTE NRBC 0.00 0.00 - 0.01 K/uL NEUTROPHILS 85 (H) 32 - 75 % LYMPHOCYTES 6 (L) 12 - 49 % MONOCYTES 8 5 - 13 % EOSINOPHILS 0 0 - 7 % BASOPHILS 0 0 - 1 % IMMATURE GRANULOCYTES 1 (H) 0.0 - 0.5 % ABS. NEUTROPHILS 11.5 (H) 1.8 - 8.0 K/UL  
 ABS. LYMPHOCYTES 0.8 0.8 - 3.5 K/UL  
 ABS. MONOCYTES 1.1 (H) 0.0 - 1.0 K/UL  
 ABS. EOSINOPHILS 0.0 0.0 - 0.4 K/UL  
 ABS. BASOPHILS 0.0 0.0 - 0.1 K/UL  
 ABS. IMM. GRANS. 0.1 (H) 0.00 - 0.04 K/UL  
 DF SMEAR SCANNED    
 RBC COMMENTS NORMOCYTIC, NORMOCHROMIC    
 WBC COMMENTS TOXIC GRANULATION    
IRON PROFILE Collection Time: 03/11/20  3:58 AM  
Result Value Ref Range Iron 18 (L) 35 - 150 ug/dL TIBC 183 (L) 250 - 450 ug/dL Iron % saturation 10 (L) 20 - 50 % GLUCOSE, POC Collection Time: 03/11/20  7:35 AM  
Result Value Ref Range Glucose (POC) 175 (H) 65 - 100 mg/dL Performed by Renaldo Rucker (PCT) GLUCOSE, POC Collection Time: 03/11/20 10:45 AM  
Result Value Ref Range Glucose (POC) 117 (H) 65 - 100 mg/dL Performed by Renaldo Rucker (PCT) Impression: POD #1, debridement left foot wound and bone biopsy Recommendation:  
Daily dressing changes with Dakin's solution for now. Await C&S and bone biopsy results, monitor wound and see if additional Sx is needed.

## 2020-03-11 NOTE — ANESTHESIA POSTPROCEDURE EVALUATION
Procedure(s): DEBRIDEMENT LEFT FOOT, INCLUDING BONE WITH MISONIX, BONE BIOPSY LEFT FIFTH METATARSAL. MAC Anesthesia Post Evaluation Patient location during evaluation: PACU Note status: Adequate. Level of consciousness: responsive to verbal stimuli and sleepy but conscious Pain management: satisfactory to patient Airway patency: patent Anesthetic complications: no 
Cardiovascular status: acceptable Respiratory status: acceptable Hydration status: acceptable Comments: +Post-Anesthesia Evaluation and Assessment Patient: Liudmila Reis MRN: [id number]  SSN: [id number] YOB: 1960  Age: 60 y.o. Sex: female Cardiovascular Function/Vital Signs BP (P) 129/46 (BP 1 Location: Left arm, BP Patient Position: At rest)   Pulse 82   Temp 36.4 °C (97.6 °F)   Resp 19   Ht 5' 9\" (1.753 m)   Wt 112.5 kg (248 lb)   SpO2 (P) 98%   BMI 36.62 kg/m² Patient is status post Procedure(s): DEBRIDEMENT LEFT FOOT, INCLUDING BONE WITH MISONIX, BONE BIOPSY LEFT FIFTH METATARSAL. Nausea/Vomiting: Controlled. Postoperative hydration reviewed and adequate. Pain: 
Pain Scale 1: (P) Numeric (0 - 10) (03/10/20 1955) Pain Intensity 1: (P) 0 (03/10/20 1955) Managed. Neurological Status:  
Neuro (WDL): Within Defined Limits (03/10/20 1741) At baseline. Mental Status and Level of Consciousness: Arousable. Pulmonary Status:  
O2 Device: (P) Room air (03/10/20 1955) Adequate oxygenation and airway patent. Complications related to anesthesia: None Post-anesthesia assessment completed. No concerns. Signed By: Zuleika Smith MD  
 3/10/2020 Post anesthesia nausea and vomiting:  controlled Vitals Value Taken Time /47 3/10/2020  8:00 PM  
Temp 36.4 °C (97.6 °F) 3/10/2020  7:55 PM  
Pulse 78 3/10/2020  8:05 PM  
Resp 17 3/10/2020  8:05 PM  
SpO2 97 % 3/10/2020  8:05 PM  
Vitals shown include unvalidated device data.

## 2020-03-11 NOTE — PERIOP NOTES
TRANSFER - OUT REPORT: 
 
Verbal report given to [facility]) on Asaf Eid  being transferred to [facility](unit) for routine progression of care Report consisted of patients Situation, Background, Assessment and  
Recommendations(SBAR). Information from the following report(s) OR Summary, Intake/Output and MAR was reviewed with the receiving nurse. Opportunity for questions and clarification was provided. Patient transported with: 
 Monitor Registered Nurse

## 2020-03-11 NOTE — PROGRESS NOTES
Problem: Falls - Risk of 
Goal: *Absence of Falls Description: Document Shirin Martinez Fall Risk and appropriate interventions in the flowsheet. Outcome: Progressing Towards Goal 
Note: Fall Risk Interventions: 
Mobility Interventions: Communicate number of staff needed for ambulation/transfer, OT consult for ADLs, Patient to call before getting OOB, PT Consult for mobility concerns, PT Consult for assist device competence, Strengthening exercises (ROM-active/passive) Medication Interventions: Patient to call before getting OOB, Teach patient to arise slowly Elimination Interventions: Call light in reach, Patient to call for help with toileting needs, Stay With Me (per policy), Toileting schedule/hourly rounds Problem: Patient Education: Go to Patient Education Activity Goal: Patient/Family Education Outcome: Progressing Towards Goal 
  
Problem: Pressure Injury - Risk of 
Goal: *Prevention of pressure injury Description: Document Valdemar Scale and appropriate interventions in the flowsheet. Outcome: Progressing Towards Goal 
Note: Pressure Injury Interventions: 
Sensory Interventions: Check visual cues for pain, Discuss PT/OT consult with provider, Float heels, Keep linens dry and wrinkle-free, Minimize linen layers, Pressure redistribution bed/mattress (bed type) Moisture Interventions: Apply protective barrier, creams and emollients, Absorbent underpads, Moisture barrier, Minimize layers, Maintain skin hydration (lotion/cream) Activity Interventions: Pressure redistribution bed/mattress(bed type), PT/OT evaluation Mobility Interventions: HOB 30 degrees or less, PT/OT evaluation, Pressure redistribution bed/mattress (bed type) Nutrition Interventions: Offer support with meals,snacks and hydration, Document food/fluid/supplement intake, Discuss nutritional consult with provider Problem: Patient Education: Go to Patient Education Activity Goal: Patient/Family Education Outcome: Progressing Towards Goal 
  
Problem: Diabetes Self-Management Goal: *Disease process and treatment process Description: Define diabetes and identify own type of diabetes; list 3 options for treating diabetes. Outcome: Progressing Towards Goal 
Goal: *Incorporating nutritional management into lifestyle Description: Describe effect of type, amount and timing of food on blood glucose; list 3 methods for planning meals. Outcome: Progressing Towards Goal 
Goal: *Incorporating physical activity into lifestyle Description: State effect of exercise on blood glucose levels. Outcome: Progressing Towards Goal 
Goal: *Developing strategies to promote health/change behavior Description: Define the ABC's of diabetes; identify appropriate screenings, schedule and personal plan for screenings. Outcome: Progressing Towards Goal 
Goal: *Using medications safely Description: State effect of diabetes medications on diabetes; name diabetes medication taking, action and side effects. Outcome: Progressing Towards Goal 
Goal: *Monitoring blood glucose, interpreting and using results Description: Identify recommended blood glucose targets  and personal targets. Outcome: Progressing Towards Goal 
Goal: *Prevention, detection, treatment of acute complications Description: List symptoms of hyper- and hypoglycemia; describe how to treat low blood sugar and actions for lowering  high blood glucose level. Outcome: Progressing Towards Goal 
Goal: *Prevention, detection and treatment of chronic complications Description: Define the natural course of diabetes and describe the relationship of blood glucose levels to long term complications of diabetes. Outcome: Progressing Towards Goal 
Goal: *Developing strategies to address psychosocial issues Description: Describe feelings about living with diabetes; identify support needed and support network Outcome: Progressing Towards Goal 
 Goal: *Insulin pump training Outcome: Progressing Towards Goal 
Goal: *Sick day guidelines Outcome: Progressing Towards Goal 
Goal: *Patient Specific Goal (EDIT GOAL, INSERT TEXT) Outcome: Progressing Towards Goal 
  
Problem: Patient Education: Go to Patient Education Activity Goal: Patient/Family Education Outcome: Progressing Towards Goal 
  
Problem: Hypertension Goal: *Blood pressure within specified parameters Outcome: Progressing Towards Goal 
Goal: *Fluid volume balance Outcome: Progressing Towards Goal 
Goal: *Labs within defined limits Outcome: Progressing Towards Goal 
  
Problem: Patient Education: Go to Patient Education Activity Goal: Patient/Family Education Outcome: Progressing Towards Goal 
  
Problem: Chronic Renal Failure Goal: *Fluid and electrolytes stabilized Outcome: Progressing Towards Goal 
  
Problem: Patient Education: Go to Patient Education Activity Goal: Patient/Family Education Outcome: Progressing Towards Goal 
  
Problem: Pain Goal: *Control of Pain Outcome: Progressing Towards Goal 
Goal: *PALLIATIVE CARE:  Alleviation of Pain Outcome: Progressing Towards Goal 
  
Problem: Patient Education: Go to Patient Education Activity Goal: Patient/Family Education Outcome: Progressing Towards Goal 
  
Problem: Infection - Risk of, Surgical Site Infection Goal: *Absence of surgical site infection signs and symptoms Outcome: Progressing Towards Goal 
  
Problem: Patient Education: Go to Patient Education Activity Goal: Patient/Family Education Outcome: Progressing Towards Goal

## 2020-03-11 NOTE — PROGRESS NOTES
0700 Bedside shift change report given to GURINDER Engel (oncoming nurse) by Shantelle Call RN (offgoing nurse). Report included the following information SBAR, Kardex, Intake/Output, MAR, Recent Results and Cardiac Rhythm A Fib.  
 
0800 Pt resting quietly in bed at this time. Podiatrist at bedside changing left lower extremity dressing. Per MD, no weight-bearing on the LLE for 24 hours post-procedure. 0941 Pt complaining of pain. Administered PRN dilaudid. 1045 IDRs performed with care team. Plan to transfer pt to telemetry and resume warfarin today. 1200 Performed dressing change to right lower extremity. 1220 Attempted to call report to [facility]. RN unable to take report at this time. Will call back shortly. 1238 Attempted to call report to [facility] a second time. 1250 TRANSFER - OUT REPORT: 
 
Verbal report given to Hans Fairbanks RN (name) on Peg Sheyter  being transferred to [facility] (unit) for routine progression of care Report consisted of patients Situation, Background, Assessment and  
Recommendations(SBAR). Information from the following report(s) SBAR, Kardex, Intake/Output, MAR, Recent Results and Cardiac Rhythm A Fib was reviewed with the receiving nurse. Lines:  
Peripheral IV 03/10/20 Left Forearm (Active) Site Assessment Clean, dry, & intact 3/11/2020 10:42 AM  
Phlebitis Assessment 0 3/11/2020 10:42 AM  
Infiltration Assessment 0 3/11/2020 10:42 AM  
Dressing Status Clean, dry, & intact 3/11/2020 10:42 AM  
Dressing Type Tape;Transparent 3/11/2020 10:42 AM  
Hub Color/Line Status Pink;Flushed; Infusing 3/11/2020 10:42 AM  
Action Taken Other (comment) 3/10/2020 10:17 AM  
  
 
Opportunity for questions and clarification was provided. Patient transported with: 
 Monitor Registered Nurse Tech

## 2020-03-11 NOTE — PROGRESS NOTES
Primary Nurse Nay Morales and Joan Munoz RN performed a dual skin assessment on this patient Impairment noted- see wound doc flow sheet Valdemar score is 18 Patent has opened reddened area on right shin. Bilateral ulcers on legs and feet covered by dressings

## 2020-03-11 NOTE — DIABETES MGMT
[address] Discharge Recommendations Presentation Morales Barron is a 60 y.o. female admitted with left leg cellulitis and consulted by Provider for advanced specialty nursing care related to inpatient diabetes management. Hyperglycemia management order set has been in place during hospital stay. She had an I &D yesterday of her left foot wound. Ms. Denisse Nam has a long history of diabetes type 2. She has had gastric bypass surgery several years ago. Objective Vital Signs Visit Vitals /62 (BP 1 Location: Left arm, BP Patient Position: At rest) Pulse 77 Temp 97.9 °F (36.6 °C) Resp 16 Ht 5' 9\" (1.753 m) Wt 112.5 kg (248 lb) SpO2 98% BMI 36.62 kg/m² Skin Warm and dry Heart Regular rate and rhythm. No murmurs, rubs or gallops Lungs Clear to auscultation without rales or rhonchi Extremities Left leg foot dressing. Blood glucose pattern Evaluation: 
BG trends stable hovering <200. She remains on 20units of lantus daily with basal and correction insulin. I anticipate her BG levels will remain stable on basal/bolus/correction insulin. Recommendation: 1. Continue basal/bolus/correction insulin. Assessment and Plan Nursing Diagnosis Risk for unstable blood glucose pattern Nursing Intervention Domain 4692 Decision-making Support Nursing Interventions Examined current inpatient diabetes control Explored factors facilitating and impeding inpatient management Identified self-management practices impeding diabetes control Explored corrective strategies with patient and responsible inpatient provider Informed patient of rational for basal bolus insulin strategy while hospitalized Signed By: DARRYL Anna March 11, 2020

## 2020-03-11 NOTE — OP NOTES
[address] 
OPERATIVE REPORT Name:  Gordy Peck 
MR#:  [id number] :  1960 ACCOUNT #:  [de-identified] DATE OF SERVICE:  03/10/2020 PREOPERATIVE DIAGNOSES: 
1. Diabetic ulcer, left foot. 2.  Diabetic infection left foot. POSTOPERATIVE DIAGNOSES: 
1. Diabetic ulcer, left foot. 2.  Diabetic infection left foot. 3.  Possible osteomyelitis, left fifth metatarsal. 
 
PROCEDURES PERFORMED: 
1. Debridement of wound to the depth of subcutaneous tissue and deep fascia, left foot. 2.  Bone biopsy, left fifth metatarsal, superficial. 
 
SURGEON:  Bennie Rashid DPM 
 
ASSISTANT:  n/a 
 
ANESTHESIA TYPE:  IV sedation with left ankle block. COMPLICATIONS:  none SPECIMENS REMOVED:  1. Aerobic and Anaerobic cultures, 2. Bone biopsy left 5th metatarsal. 
 
IMPLANTS:  none ESTIMATED BLOOD LOSS:  30 ml PROCEDURE IN DETAIL:  The patient was brought into the operating room and placed supine upon the OR table. After the administration of IV sedation, I injected the left ankle with 30 mL of 0.5% plain Marcaine in the form of an ankle block. The foot was then prepped and draped in the usual sterile technique. The foot was elevated for 3 minutes before the inflation of a tourniquet around the left ankle to a pressure of 250 mmHg. Attention was first directed to the open wound on the plantar left foot near the fifth metatarsal base. The surface of the wound was superficially debrided with a #10 blade. I used a sterile forceps to probe the wound and it probed to the left fifth metatarsal base. Then with a combination of a #15 blade and small curved Metzenbaum scissors I debrided the necrotic tissue including subcutaneous and part of the abductor digiti minimi muscle surrounding the sinus tract to the left fifth metatarsal. 
 
I directed my attention to the bulla on the lateral left fifth metatarsal base.   Initially a linear incision from proximal to distal was made over this, but I quickly determined that most of the skin covering the bulla was nonviable and so ended up excising a roughly circular area of skin 4 cm x 4.5 cm over the left fifth metatarsal base. During the process, liquid creamy white pus was expressed from the wound. Both aerobic and anaerobic cultures were obtained from this portion of the wound and the pus. The periosteum of the left fifth metatarsal was exposed in this process as well as the portion of the abductor digiti minimi at the fifth metatarsal base. Both necrotic subcutaneous tissue and muscle were sharply debrided with small curved Metzenbaum scissors from this area. Next, both surgical wounds were further debrided utilizing the [employer] ultrasonic debriding tool until a relatively clean base was obtained. At this point the wound on the lateral left fifth metatarsal was 4 x 4.5 cm and the wound at the plantar left fifth metatarsal was 3 x 3.5 cm. These were  by an approximately 1 inch width bridge of viable skin. The patient is at extremely high risk of having osteomyelitis of the left fifth metatarsal despite the negative CT scan. A #15 blade was used to make a 1-inch long incision in the periosteum at the fifth metatarsal base. The periosteum was reflected superiorly and inferiorly and a small rongeur was used to take 3 samples of bone from the area. These were placed into a specimen cup and will be sent to pathology for examination for possible osteomyelitis. The area was further cleaned with the [employer] ultrasonic debriding tool. The periosteum was then closed with 4-0 Vicryl suture. The tourniquet was released and several bleeding vessels were ligated with 4-0 Vicryl. The foot was then cleaned. The wounds were covered with Surgifoam due to continued light oozing. This was then covered with 4x4s, ABD pads, Kerlix and finally Coban.  
 
The patient was then transported to the recovery room with vital signs stable and vascular status intact. She will remain until stable for transfer back up to her hospital bed. I will continue to follow the patient's in-house. Antonio Robert DPM [id number] 
D:  03/10/2020 20:07 
T:  03/10/2020 23:51 
JOB #:  [id number]

## 2020-03-11 NOTE — PROGRESS NOTES
Nephrology Progress Note [facility] 
[email or web address]                  Phone - [phone] Patient: Fatou Rashid Date- 3/11/2020 Admit Date: 3/8/2020 YOB: 1960 CC: Follow up for  CKD, HTN Subjective: Interval History:  
- na improved Good urine out put 
bp stable Cr. Stable Iron sats 10% ROS:- as above IMPRESSION:  
· CKD 3 LIKELY due to DM nephropathy and HTN nephrosclerosis- bl. Cr. 1.8-2.1 · Hyponatremia due to fluid overload · Edema · hypertension · Diabetic foot · Proteinuria likely due to DM nephropathy and HTN nephroscl- urine pr/cr 1.5 g/g · Metabolic acidosis · Hypokalemia · Anemia- iron defi - iron sats 10% · Vit d defi PLAN:  
· Continue bumex to 1 mg bid · Continue po bicarb · Give iv iron · Start epogen · Follow bmp · Hold clonidine,  
· Hold metolazone Physical exam:  
GEN:  NAD NECK:  Supple, no thyromegaly RESP: CTA  b/l, no  wheezing, CVS: RRR,S1,S2 ABDO:  soft , non tender, No mass NEURO: non focal, normal speech EXT: + Edema Left Foot dressing + Care Plan discussed with: patient, nurse Objective:  
Visit Vitals /57 (BP 1 Location: Left arm, BP Patient Position: At rest) Pulse 92 Temp 98.1 °F (36.7 °C) Resp 16 Ht 5' 9\" (1.753 m) Wt 112.5 kg (248 lb) SpO2 100% BMI 36.62 kg/m² Last 3 Recorded Weights in this Encounter 03/09/20 0453 03/09/20 1055 03/10/20 1730 Weight: 112.5 kg (248 lb) 112.5 kg (248 lb) 112.5 kg (248 lb) 03/09 1901 - 03/11 0700 In: 36 [P.O.:480; I.V.:650] Out: 1150 [Urine:1100] Intake/Output Summary (Last 24 hours) at 3/11/2020 3594 Last data filed at 3/11/2020 0400 Gross per 24 hour Intake 1130 ml Output 650 ml Net 480 ml Chart reviewed. Pertinent Notes reviewed. Medication list  reviewed Current Facility-Administered Medications Medication  [START ON 3/12/2020] VANCOMYCIN INFORMATION NOTE  bumetanide (BUMEX) injection 1 mg  
 vancomycin (VANCOCIN) 1,000 mg in 0.9% sodium chloride (MBP/ADV) 250 mL  
 HYDROmorphone (PF) (DILAUDID) injection 0.5 mg  
 sodium hypochlorite (HALF STRENGTH DAKIN'S) 0.25% irrigation (bottle)  cefepime (MAXIPIME) 2 g in 0.9% sodium chloride (MBP/ADV) 100 mL  dilTIAZem (CARDIZEM) IR tablet 60 mg  
 insulin lispro (HUMALOG) injection 5 Units  WARFARIN INFORMATION NOTE (COUMADIN)  sodium bicarbonate tablet 650 mg  
 sodium chloride (NS) flush 5-10 mL  busPIRone (BUSPAR) tablet 10 mg  
 cholecalciferol (VITAMIN D3) (1000 Units /25 mcg) tablet 5 Tab  cyanocobalamin (VITAMIN B12) tablet 1,000 mcg  doxazosin (CARDURA) tablet 4 mg  hydrALAZINE (APRESOLINE) tablet 100 mg  
 insulin glargine (LANTUS) injection 20 Units  metoprolol succinate (TOPROL-XL) XL tablet 100 mg  potassium chloride SR (KLOR-CON 10) tablet 10 mEq  sertraline (ZOLOFT) tablet 50 mg  
 sodium chloride (NS) flush 5-40 mL  sodium chloride (NS) flush 5-40 mL  acetaminophen (TYLENOL) tablet 650 mg  
 naloxone (NARCAN) injection 0.4 mg  
 glucose chewable tablet 16 g  
 dextrose (D50W) injection syrg 12.5-25 g  
 glucagon (GLUCAGEN) injection 1 mg  
 insulin lispro (HUMALOG) injection Data Review : 
Recent Labs  
  03/11/20 
0358 03/10/20 
0158 03/09/20 
0430  134* 136  
K 3.6 3.8 4.3 * 106 110* CO2 18* 18* 17*  
BUN 26* 25* 27* CREA 1.91* 1.88* 1.92* * 123* 155* CA 8.6 9.0 8.5 Recent Labs  
  03/11/20 0358 03/09/20 
0430 03/08/20 
1025 WBC 13.5* 12.5* 16.0* HGB 8.5* 8.8* 9.8* HCT 26.7* 28.7* 31.1*  
 286 290 Recent Labs  
  03/11/20 
0358 TIBC 183* PSAT 10* Recent Labs 03/08/20 
1909 03/08/20 
1125 TROIQ 0.40* 0.45* Lab Results Component Value Date/Time  Color YELLOW/STRAW 03/08/2020 05:00 PM  
 Appearance CLOUDY (A) 03/08/2020 05:00 PM  
 Specific gravity 1.019 03/08/2020 05:00 PM  
 pH (UA) 5.0 03/08/2020 05:00 PM  
 Protein 100 (A) 03/08/2020 05:00 PM  
 Glucose 100 (A) 03/08/2020 05:00 PM  
 Ketone 15 (A) 03/08/2020 05:00 PM  
 Bilirubin NEGATIVE  03/08/2020 05:00 PM  
 Urobilinogen 0.2 03/08/2020 05:00 PM  
 Nitrites NEGATIVE  03/08/2020 05:00 PM  
 Leukocyte Esterase SMALL (A) 03/08/2020 05:00 PM  
 Epithelial cells FEW 03/08/2020 05:00 PM  
 Bacteria NEGATIVE  03/08/2020 05:00 PM  
 WBC 0-4 03/08/2020 05:00 PM  
 RBC 0-5 03/08/2020 05:00 PM  
 
Lab Results Component Value Date/Time Culture result: PENDING 03/10/2020 07:15 PM  
 Culture result: NO GROWTH 3 DAYS 03/08/2020 10:00 AM  
 Culture result: NO GROWTH 5 DAYS 01/14/2020 05:50 PM  
 
Lab Results Component Value Date/Time Specimen Description: RENEE 04/09/2014 12:27 PM  
 Specimen Description: [facility] 10/28/2013 05:00 PM  
 Specimen Description: [facility] 10/28/2013 03:09 PM  
 
Lab Results Component Value Date/Time Creatinine, urine 45.70 03/09/2020 09:50 PM  
 
Results from [facility] Encounter encounter on 03/08/20 XR FOOT LT MIN 3 V Narrative EXAM: XR FOOT LT MIN 3 V 
 
INDICATION: L foot swelling; eval for osteo changes. COMPARISON: 3/3/2020. FINDINGS: Three views of the left foot demonstrate no bone erosion, periostitis 
or other evidence of osteomyelitis. There are 2 plantar soft tissue ulcers, 
without foreign body. There is chronic deformity of the distal portion of the second toe and mid foot 
osteoarthrosis. No acute fracture is seen. Arterial calcification is noted. Impression IMPRESSION: No signal change. Plantar soft tissue ulcers. No apparent 
osteomyelitis. RENAL USG 
TECHNIQUE:  
Routine ultrasound images of the kidneys and retroperitoneum were obtained. 
  
FINDINGS:  
The right kidney measures 11.5 cm in length. The left kidney measures 10.7 cm in 
length. There is increased echogenicity of the kidneys bilaterally, consistent with 
medical renal disease. There is thinning of the right renal cortex. No renal 
calculus is seen. There is no hydronephrosis. No renal cyst or solid mass is 
evident.   
  
The abdominal aorta and common iliac arteries were obscured by gas. The 
visualized portion of the IVC appears patent. The urinary bladder demonstrates 
no filling defect.  
  
IMPRESSION IMPRESSION:  
Echogenic kidneys, consistent with medical renal disease. Cortical thinning on 
the right. No hydronephrosis. . 
  
 
                Roland Benedict MD 
[facility] 
 [email or web address] [address] [place], [address] Phone - [phone] Fax - [phone]

## 2020-03-11 NOTE — PROGRESS NOTES
Hospitalist Progress Note NAME: Jacoby Naik :  1960 MRN:  [id number] Assessment / Plan: 
Atrial fibrillation/RVR 
- s/p Cardizem bolus and remains on Cardizem gtt with good rate control -- now transitioning to oral diltiazem 
- Continue metoprolol 
- Continue coumadin; pharmacy consult for dosing 
- supra therapeutic resolved  INR 2.3 Today before surgery needs to be 1.5- 2, recheck today pending ordered for 3 pm 
- Cardiology consult reviewed and appreciated  
  
Left leg cellulitis and ulceration of the left foot 
- Continue with empiric Vancomycin and Cefepime - Blood cultures with no growth to date - CT leg noted - Seen in consultation by Dr. Harriet Stewart with tentative plan for surgery tomorrow (will need to discuss INR with him) - Venous US LLE negative for DVT  
- Pain control prn - Elevated limb as needed  
- POD#1 Debridement left foot wound and bone biopsy . 
  
Elevated troponin - Likely due to demand with rapid atrial fibrillation - Downtrending  
  
CKD4 - Appears to be stable at baseline creatinine - Renally dose medications  
- sodium bicarbonate 650 mg bid - BUMEX 1 MG BID per nephrology - Give IV iron - Epogen - Nephrology on board. Greatly appreciate input  
  
HTN 
- Continue home medications: hydralazine,Toprol, Cardura , cardizem  
- hold clonidine and  Metolazone  
  
DM - Lantus 20 units every day - Novolog 5 units with meals - ISS 
 
  
HLD 
  
Anxiety  
- Continue Zoloft 
  
CHF 
- No exacerbation - On  Bumex  
  
SSS 
- Pacemaker Morbid obesity due to BMI >35 with DM, HTN / Body mass index is 36.62 kg/m². Code status: Full Prophylaxis: anticoagulated with warfarin Recommended Disposition: Home w/Family Subjective: Chief Complaint / Reason for Physician Visit: follow up rapid atrial fibrillation and cellulitis, foot ulcers Feeling okay this morning except for pain in her left leg and foot.  No chest pain, palpitations, shortness of breath. No nausea or vomiting. Review of Systems: 
Symptom Y/N Comments  Symptom Y/N Comments Fever/Chills n   Chest Pain n   
Poor Appetite    Edema y Cough n   Abdominal Pain n   
Sputum    Joint Pain SOB/CHOW n   Pruritis/Rash Nausea/vomit n   Tolerating PT/OT Diarrhea n   Tolerating Diet Constipation n   Other Could NOT obtain due to:   
 
Objective: VITALS:  
Last 24hrs VS reviewed since prior progress note. Most recent are: 
Patient Vitals for the past 24 hrs: 
 Temp Pulse Resp BP SpO2  
03/11/20 1042 97.9 °F (36.6 °C) 77 16 123/62 98 % 03/11/20 0732 98.1 °F (36.7 °C) 92 16 135/57 100 % 03/11/20 0400 97.7 °F (36.5 °C) 84 16 140/62 99 % 03/11/20 0200  82  144/59   
03/11/20 0000  86  115/44   
03/10/20 2300 97.8 °F (36.6 °C) 84 18 129/52 98 % 03/10/20 2200    140/58   
03/10/20 2130  82  137/56 98 % 03/10/20 2115    124/49   
03/10/20 2100  68  138/59 100 % 03/10/20 2045  71  141/52 99 % 03/10/20 2036 98.1 °F (36.7 °C) 74 16 136/62 100 % 03/10/20 2020  76 17 134/53 97 % 03/10/20 2015 98 °F (36.7 °C) 74 15 131/48 98 % 03/10/20 2010  78 16 137/51 97 % 03/10/20 2005  78 17 133/46 97 % 03/10/20 2000  77 14 128/47 98 % 03/10/20 1955 97.6 °F (36.4 °C) 79 20 129/46 97 % 03/10/20 1751  71 20 128/63 98 % 03/10/20 1730 98.1 °F (36.7 °C) 75 16 128/59 99 % 03/10/20 1501 97.2 °F (36.2 °C) 71 18 137/53 99 % 03/10/20 1200  200 [facility] Intake/Output Summary (Last 24 hours) at 3/11/2020 1140 Last data filed at 3/11/2020 1042 Gross per 24 hour Intake 1730 ml Output 1000 ml Net 730 ml PHYSICAL EXAM: 
General: WD, WN. Alert, cooperative, no acute distress   
EENT:  EOMI. Anicteric sclerae. MMM Resp:  CTA bilaterally, no wheezing or rales. No accessory muscle use CV:  Irregularly irregular  rhythm, +LLE edema 1-2+ GI:  Soft, Non distended, Non tender.  +Bowel sounds Neurologic:  Alert and oriented X 3, normal speech, Psych:   Good insight. Not anxious nor agitated Skin:  No jaundice. Erythema of distal left lower extremity and foot, with heat and tenderness. Reviewed most current lab test results and cultures  YES Reviewed most current radiology test results   YES Review and summation of old records today    NO Reviewed patient's current orders and MAR    YES 
PMH/SH reviewed - no change compared to H&P 
________________________________________________________________________ Care Plan discussed with: 
  Comments Patient x Family RN Care Manager Consultant     
                 x Multidiciplinary team rounds were held today with , nursing, pharmacist and clinical coordinator. Patient's plan of care was discussed; medications were reviewed and discharge planning was addressed. ________________________________________________________________________ Total NON critical care TIME:  25   Minutes Total CRITICAL CARE TIME Spent:   Minutes non procedure based Comments >50% of visit spent in counseling and coordination of care    
________________________________________________________________________ Milton Lopez MD  
 
Procedures: see electronic medical records for all procedures/Xrays and details which were not copied into this note but were reviewed prior to creation of Plan. LABS: 
I reviewed today's most current labs and imaging studies. Pertinent labs include: 
Recent Labs  
  03/11/20 
0358 03/09/20 
0430 WBC 13.5* 12.5* HGB 8.5* 8.8* HCT 26.7* 28.7*  
 286 Recent Labs  
  03/11/20 
0358 03/10/20 
1532 03/10/20 
0158 03/09/20 
0430   --  134* 136  
K 3.6  --  3.8 4.3 *  --  106 110* CO2 18*  --  18* 17* *  --  123* 155* BUN 26*  --  25* 27* CREA 1.91*  --  1.88* 1.92* CA 8.6  --  9.0 8.5 INR 1.3* 1.4* 2.3* 3.7*  
 
 
 Signed: Harish Ibarra MD

## 2020-03-12 LAB
ALBUMIN SERPL ELPH-MCNC: 2.7 G/DL (ref 2.9–4.4)
ALBUMIN/GLOB SERPL: 0.8 {RATIO} (ref 0.7–1.7)
ALPHA1 GLOB SERPL ELPH-MCNC: 0.4 G/DL (ref 0–0.4)
ALPHA2 GLOB SERPL ELPH-MCNC: 0.9 G/DL (ref 0.4–1)
ANION GAP SERPL CALC-SCNC: 5 MMOL/L (ref 5–15)
B-GLOBULIN SERPL ELPH-MCNC: 0.8 G/DL (ref 0.7–1.3)
BUN SERPL-MCNC: 24 MG/DL (ref 6–20)
BUN/CREAT SERPL: 13 (ref 12–20)
CALCIUM SERPL-MCNC: 8.9 MG/DL (ref 8.5–10.1)
CHLORIDE SERPL-SCNC: 111 MMOL/L (ref 97–108)
CO2 SERPL-SCNC: 21 MMOL/L (ref 21–32)
CREAT SERPL-MCNC: 1.88 MG/DL (ref 0.55–1.02)
DATE LAST DOSE: ABNORMAL
GAMMA GLOB SERPL ELPH-MCNC: 1.4 G/DL (ref 0.4–1.8)
GLOBULIN SER-MCNC: 3.5 G/DL (ref 2.2–3.9)
GLUCOSE BLD STRIP.AUTO-MCNC: 121 MG/DL (ref 65–100)
GLUCOSE BLD STRIP.AUTO-MCNC: 149 MG/DL (ref 65–100)
GLUCOSE BLD STRIP.AUTO-MCNC: 171 MG/DL (ref 65–100)
GLUCOSE BLD STRIP.AUTO-MCNC: 179 MG/DL (ref 65–100)
GLUCOSE BLD STRIP.AUTO-MCNC: 56 MG/DL (ref 65–100)
GLUCOSE BLD STRIP.AUTO-MCNC: 59 MG/DL (ref 65–100)
GLUCOSE BLD STRIP.AUTO-MCNC: 91 MG/DL (ref 65–100)
GLUCOSE SERPL-MCNC: 85 MG/DL (ref 65–100)
IGA SERPL-MCNC: 227 MG/DL (ref 87–352)
IGG SERPL-MCNC: 1388 MG/DL (ref 700–1600)
IGM SERPL-MCNC: 134 MG/DL (ref 26–217)
INR PPP: 1.3 (ref 0.9–1.1)
INTERPRETATION SERPL IEP-IMP: ABNORMAL
KAPPA LC FREE SER-MCNC: 143.3 MG/L (ref 3.3–19.4)
KAPPA LC FREE/LAMBDA FREE SER: 0.95 {RATIO} (ref 0.26–1.65)
LAMBDA LC FREE SERPL-MCNC: 151.6 MG/L (ref 5.7–26.3)
M PROTEIN SERPL ELPH-MCNC: ABNORMAL G/DL
POTASSIUM SERPL-SCNC: 4 MMOL/L (ref 3.5–5.1)
PROT SERPL-MCNC: 6.2 G/DL (ref 6–8.5)
PROTHROMBIN TIME: 12.9 SEC (ref 9–11.1)
REPORTED DOSE,DOSE: ABNORMAL UNITS
REPORTED DOSE/TIME,TMG: ABNORMAL
SERVICE CMNT-IMP: ABNORMAL
SERVICE CMNT-IMP: NORMAL
SODIUM SERPL-SCNC: 137 MMOL/L (ref 136–145)
VANCOMYCIN TROUGH SERPL-MCNC: 25 UG/ML (ref 5–10)

## 2020-03-12 PROCEDURE — 65660000000 HC RM CCU STEPDOWN

## 2020-03-12 PROCEDURE — 77030038269 HC DRN EXT URIN PURWCK BARD -A

## 2020-03-12 PROCEDURE — 74011250636 HC RX REV CODE- 250/636: Performed by: PODIATRIST

## 2020-03-12 PROCEDURE — 82962 GLUCOSE BLOOD TEST: CPT

## 2020-03-12 PROCEDURE — 74011250637 HC RX REV CODE- 250/637: Performed by: INTERNAL MEDICINE

## 2020-03-12 PROCEDURE — 74011636637 HC RX REV CODE- 636/637: Performed by: PODIATRIST

## 2020-03-12 PROCEDURE — 74011250637 HC RX REV CODE- 250/637: Performed by: PODIATRIST

## 2020-03-12 PROCEDURE — 85610 PROTHROMBIN TIME: CPT

## 2020-03-12 PROCEDURE — 80202 ASSAY OF VANCOMYCIN: CPT

## 2020-03-12 PROCEDURE — 74011000250 HC RX REV CODE- 250: Performed by: PODIATRIST

## 2020-03-12 PROCEDURE — 80048 BASIC METABOLIC PNL TOTAL CA: CPT

## 2020-03-12 PROCEDURE — 74011250636 HC RX REV CODE- 250/636: Performed by: INTERNAL MEDICINE

## 2020-03-12 PROCEDURE — 74011000258 HC RX REV CODE- 258: Performed by: PODIATRIST

## 2020-03-12 PROCEDURE — 36415 COLL VENOUS BLD VENIPUNCTURE: CPT

## 2020-03-12 RX ORDER — WARFARIN 2 MG/1
2 TABLET ORAL ONCE
Status: COMPLETED | OUTPATIENT
Start: 2020-03-12 | End: 2020-03-12

## 2020-03-12 RX ADMIN — DILTIAZEM HYDROCHLORIDE 60 MG: 60 TABLET, FILM COATED ORAL at 17:32

## 2020-03-12 RX ADMIN — BUMETANIDE 1 MG: 0.25 INJECTION INTRAMUSCULAR; INTRAVENOUS at 17:32

## 2020-03-12 RX ADMIN — SODIUM BICARBONATE 650 MG: 650 TABLET ORAL at 17:32

## 2020-03-12 RX ADMIN — DILTIAZEM HYDROCHLORIDE 60 MG: 60 TABLET, FILM COATED ORAL at 13:51

## 2020-03-12 RX ADMIN — DILTIAZEM HYDROCHLORIDE 60 MG: 60 TABLET, FILM COATED ORAL at 08:36

## 2020-03-12 RX ADMIN — HYDRALAZINE HYDROCHLORIDE 100 MG: 50 TABLET, FILM COATED ORAL at 06:09

## 2020-03-12 RX ADMIN — INSULIN LISPRO 5 UNITS: 100 INJECTION, SOLUTION INTRAVENOUS; SUBCUTANEOUS at 13:51

## 2020-03-12 RX ADMIN — CYANOCOBALAMIN TAB 500 MCG 1000 MCG: 500 TAB at 08:35

## 2020-03-12 RX ADMIN — Medication 10 ML: at 21:57

## 2020-03-12 RX ADMIN — INSULIN GLARGINE 20 UNITS: 100 INJECTION, SOLUTION SUBCUTANEOUS at 12:10

## 2020-03-12 RX ADMIN — HYDRALAZINE HYDROCHLORIDE 100 MG: 50 TABLET, FILM COATED ORAL at 21:58

## 2020-03-12 RX ADMIN — SODIUM BICARBONATE 650 MG: 650 TABLET ORAL at 08:36

## 2020-03-12 RX ADMIN — WARFARIN SODIUM 2 MG: 2 TABLET ORAL at 17:32

## 2020-03-12 RX ADMIN — VANCOMYCIN HYDROCHLORIDE 1000 MG: 1 INJECTION, POWDER, LYOPHILIZED, FOR SOLUTION INTRAVENOUS at 13:50

## 2020-03-12 RX ADMIN — METOPROLOL SUCCINATE 100 MG: 50 TABLET, EXTENDED RELEASE ORAL at 08:36

## 2020-03-12 RX ADMIN — HYDROMORPHONE HYDROCHLORIDE 0.5 MG: 1 INJECTION, SOLUTION INTRAMUSCULAR; INTRAVENOUS; SUBCUTANEOUS at 21:55

## 2020-03-12 RX ADMIN — CEFEPIME HYDROCHLORIDE 2 G: 2 INJECTION, POWDER, FOR SOLUTION INTRAVENOUS at 10:53

## 2020-03-12 RX ADMIN — BUMETANIDE 1 MG: 0.25 INJECTION INTRAMUSCULAR; INTRAVENOUS at 08:36

## 2020-03-12 RX ADMIN — Medication 10 ML: at 13:53

## 2020-03-12 RX ADMIN — HYDRALAZINE HYDROCHLORIDE 100 MG: 50 TABLET, FILM COATED ORAL at 13:51

## 2020-03-12 RX ADMIN — DEXTROSE MONOHYDRATE 25 G: 25 INJECTION, SOLUTION INTRAVENOUS at 16:35

## 2020-03-12 RX ADMIN — CEFEPIME HYDROCHLORIDE 2 G: 2 INJECTION, POWDER, FOR SOLUTION INTRAVENOUS at 22:02

## 2020-03-12 RX ADMIN — SERTRALINE HYDROCHLORIDE 50 MG: 50 TABLET ORAL at 08:36

## 2020-03-12 RX ADMIN — POTASSIUM CHLORIDE 10 MEQ: 750 TABLET, FILM COATED, EXTENDED RELEASE ORAL at 08:36

## 2020-03-12 RX ADMIN — BUSPIRONE HYDROCHLORIDE 10 MG: 10 TABLET ORAL at 08:36

## 2020-03-12 RX ADMIN — IRON SUCROSE 200 MG: 20 INJECTION, SOLUTION INTRAVENOUS at 08:35

## 2020-03-12 RX ADMIN — SODIUM BICARBONATE 650 MG: 650 TABLET ORAL at 22:00

## 2020-03-12 RX ADMIN — MELATONIN 5 TABLET: at 12:11

## 2020-03-12 RX ADMIN — Medication 10 ML: at 05:30

## 2020-03-12 NOTE — PROGRESS NOTES
Pharmacy Dosing of Warfarin Indication: A. fib Goal INR: 2-3 PTA Warfarin Dose: 4 mg on Sun, Wed and 2 mg on all other days Concurrent anticoagulants: none Concurrent antiplatelet: none Major Interacting Medications Drugs that may increase INR: none Drugs that may decrease INR: Phytonadione 2.5 mg on 3/9 Conditions that may increase/decrease INR (CHF, C. diff, cirrhosis, thyroid disorder, hypoalbuminemia): None Labs: 
Recent Labs  
  03/12/20 
0354 03/11/20 
0358 03/10/20 
1532 INR 1.3* 1.3* 1.4* HGB  --  8.5*  --   
PLT  --  361  -- Impression/Plan:   
Warfarin 2 mg for tonight INR daily, CBC w/o diff every other day Thanks RICO ColonD 
 
[email or web address]%[email or web address]. pdf

## 2020-03-12 NOTE — PROGRESS NOTES
Pharmacy Automatic Renal Dosing Protocol - Antimicrobials Indication for Antimicrobials: skin and soft tissue infection - L leg, h/o chronic foot ulcers Current Regimen of Each Antimicrobial:  
Vancomycin 1000 mg IV every 24 hours (Start Date 3/8; day ) Cefepime 2 g IV every 12 hours (Start Date 3/8; day ) Previous Antimicrobial Therapy: N/a 
 
Goal Level: VANCOMYCIN TROUGH GOAL RANGE Vancomycin Trough: 15 - 20 mcg/mL  (AUC: 400 - 600 mg/hr/Liter/day) Date Dose & Interval Measured (mcg/mL) Extrapolated (mcg/mL) 3/10 1250 mg Q24H 19.6 22.2  
3/12 @ 1312 1 g every 24 hours 25 25.5 Date & time of next level: no additional at this time Significant Cultures:  
3/8 blood: NGTD x 4 days- pending 3/10 Wound: heavy group B strep, moderate group G strep, light poss staph aureus, and scant proteus- pending 3/10 Anaerobic: pending Radiology / Imaging results: (X-ray, CT scan or MRI):  
3/8 L foot XR IMPRESSION: No signal change. Plantar soft tissue ulcers. No apparent osteomyelitis. 3/8 LLE CT (prelim) Unenhanced CT Left Foot show no bone erosion, periostitis or other radiographic features of overt osteomyelitis. There is plantar soft tissue emphysema. Paralysis, amputations, malnutrition: none noted Labs: 
Recent Labs  
  20 
0354 20 
0358 03/10/20 
0158 CREA 1.88* 1.91* 1.88* BUN 24* 26* 25* WBC  --  13.5*  -- Temp (24hrs), Av.5 °F (36.4 °C), Min:97.3 °F (36.3 °C), Max:97.8 °F (36.6 °C) Creatinine Clearance (mL/min) or Dialysis: 33 mL/min (IBW) Impression/Plan:  
Vancomycin trough resulted as supratherapeutic at 25.5 mcg/mL. Will change to 1000 mg IV every 36 hours for an estimated trough of 15.1 mcg/mL. Will continue current regimen for cefepime as appropriate for indication and renal function. Antimicrobial stop date 7 days Pharmacy will follow daily and adjust medications as appropriate for renal function and/or serum levels. Thank you, Cathy Retana, PHARMD

## 2020-03-12 NOTE — WOUND CARE
Wound care saw this patient briefly at the request of nursing and wound care orders written for her wounds. Nursing to do the wound care today. Prevention is essential for this patient because she has limited sensation in her feet.   
Mikayla Winn RN, BSN, [employer]

## 2020-03-12 NOTE — PROGRESS NOTES
Marisela Rehman [address] Phone: [phone] Fax: [phone] [address] [facility], [facility] [address] [facility], [address] [place] [address] [address] [place], [address] [place] [address] Blvd [facility] and Surgeon Podiatry - Progress Note Assessment/Plan: 
-status post day 2 left foot irrigation and debridement of ulcerations and 5th metatarsal bone biopsy 
-dressings changed today: dakins wet to dry packed into lateral and plantar 5th metatarsal wounds and silvasorb with wet to dry dressing to plantar heel left foot, 4X4 gauze and kerlex with tape 
-continue IV antibiotics per wound culture result 
-NWB to left foot, recommend PT 
-awaiting final bone biopsy results to determine if further surgery is warranted 
-patient may benefit from wound vac in future to help promote granulation tissue 
-Dr. Carey Cardenas to round on patient tomorrow Subjective: 
Pt complains of wounds to left foot. She is post op day 2 left foot irrigation and debridement with 5th metatarsal bone biopsy. Dressings are clean dry and intact Patient denies chills, nausea, vomiting, chest pain, shortness of breath. History: 
Atrial fibrillation with RVR ([address] [I48.91] Left leg cellulitis [Y05.052] Allergies Allergen Reactions  Ace Inhibitors Cough  Amlodipine Swelling  Avapro [Irbesartan] Unable to Obtain  Bactrim [Sulfamethoxazole-Trimethoprim] Other (comments) Sore mouth  Captopril Cough  Carvedilol Diarrhea [address]  Morphine Nausea and Vomiting and Swelling  Penicillins Hives Tolerated cefepime 1/2020  Pravachol [Pravastatin] Nausea Only  Seafood [Shellfish Containing Products] Hives  Singulair [Montelukast] Other (comments)  
  palpitations Family History Problem Relation Age of Onset  Cancer Mother  Heart Disease Mother  Alcohol abuse Father  Diabetes Brother  Hypertension Brother [unfilled] Past Surgical History:  
Procedure Laterality Date  ABDOMEN SURGERY PROC UNLISTED    
 gastric bypass  GASTRIC BYPASS,OBESE<150CM SAW-EN-Y  7/08  
 [facility]cher  HX AFIB ABLATION    
 HX CARPAL TUNNEL RELEASE [id number] RIGHT MODIFIED RADICAL   
 HX MOHS PROCEDURES  1995  
 right  HX ORTHOPAEDIC Right   
 knee surgery  HX PACEMAKER    
 IR INSERT TUNL CVC W PORT OVER 5 YEARS    
 LAMINECTOMY,CERVICAL  1994  
 NEEDLE BIOPSY LIVER,W OTHR [address]  8.21.07  
 UT [address] AND 1994  UT TRABECULOPLASTY BY LASER SURGERY    
 US GUIDE ASP OVARIAN CYST ABD APPROACH  8.21.07  
 RIGHT Social History Tobacco Use  Smoking status: Never Smoker  Smokeless tobacco: Never Used Substance Use Topics  Alcohol use: Yes Comment: rare Social History Substance and Sexual Activity Alcohol Use Yes Comment: rare Social History Substance and Sexual Activity Drug Use No  
  
Social History Tobacco Use Smoking Status Never Smoker Smokeless Tobacco Never Used Current Facility-Administered Medications Medication Dose Route Frequency  [START ON 3/14/2020] vancomycin (VANCOCIN) 1,000 mg in 0.9% sodium chloride (MBP/ADV) 250 mL  1,000 mg IntraVENous Q36H  
 iron sucrose (VENOFER) injection 200 mg  200 mg IntraVENous DAILY  epoetin viet-epbx (RETACRIT) 12,000 Units combo injection  12,000 Units SubCUTAneous Q MON, WED & FRI  
 HYDROcodone-acetaminophen (NORCO) 5-325 mg per tablet 1 Tab  1 Tab Oral Q6H PRN  
 docusate sodium (COLACE) capsule 100 mg  100 mg Oral BID  
  bumetanide (BUMEX) injection 1 mg  1 mg IntraVENous BID  
 HYDROmorphone (PF) (DILAUDID) injection 0.5 mg  0.5 mg IntraVENous Q3H PRN  
 sodium hypochlorite (HALF STRENGTH DAKIN'S) 0.25% irrigation (bottle)   Topical DAILY  cefepime (MAXIPIME) 2 g in 0.9% sodium chloride (MBP/ADV) 100 mL  2 g IntraVENous Q12H  
 dilTIAZem (CARDIZEM) IR tablet 60 mg  60 mg Oral TIDAC  WARFARIN INFORMATION NOTE (COUMADIN)   Other QPM  
 sodium bicarbonate tablet 650 mg  650 mg Oral TID  sodium chloride (NS) flush 5-10 mL  5-10 mL IntraVENous PRN  
 busPIRone (BUSPAR) tablet 10 mg  10 mg Oral DAILY  cholecalciferol (VITAMIN D3) (1000 Units /25 mcg) tablet 5 Tab  5,000 Units Oral DAILY  cyanocobalamin (VITAMIN B12) tablet 1,000 mcg  1,000 mcg Oral DAILY  doxazosin (CARDURA) tablet 4 mg  4 mg Oral DAILY  hydrALAZINE (APRESOLINE) tablet 100 mg  100 mg Oral Q8H  
 insulin glargine (LANTUS) injection 20 Units  20 Units SubCUTAneous DAILY  metoprolol succinate (TOPROL-XL) XL tablet 100 mg  100 mg Oral DAILY  potassium chloride SR (KLOR-CON 10) tablet 10 mEq  10 mEq Oral DAILY  sertraline (ZOLOFT) tablet 50 mg  50 mg Oral DAILY  sodium chloride (NS) flush 5-40 mL  5-40 mL IntraVENous Q8H  
 sodium chloride (NS) flush 5-40 mL  5-40 mL IntraVENous PRN  
 acetaminophen (TYLENOL) tablet 650 mg  650 mg Oral Q4H PRN  
 naloxone (NARCAN) injection 0.4 mg  0.4 mg IntraVENous PRN  
 glucose chewable tablet 16 g  4 Tab Oral PRN  
 dextrose (D50W) injection syrg 12.5-25 g  25-50 mL IntraVENous PRN  
 glucagon (GLUCAGEN) injection 1 mg  1 mg IntraMUSCular PRN  
 insulin lispro (HUMALOG) injection   SubCUTAneous AC&HS Objective: 
Vitals:  
Patient Vitals for the past 12 hrs: 
 BP Temp Pulse Resp SpO2  
03/12/20 1641 142/62 96.6 °F (35.9 °C) 96 12 97 % 03/12/20 1449 140/55 97.5 °F (36.4 °C) 78 18 100 % 03/12/20 1344 139/61  79    
03/12/20 1058 127/51 97.8 °F (36.6 °C) 71 18 98 % 03/12/20 0726 125/54 97.5 °F (36.4 °C) 85 18 97 % Physical Exam Lower Extremity 
  
Dressings removed left foot. Wound beds lateral and plantar foot surgical debridement sites are clean with granulation tissue down level of muscle/fascia 
 left lateral wound 4 x 4.5 X 0.6 cm and the wound at the plantar left fifth metatarsal is 3 x 3.5 X 0.8 cm. These areseparated by an approximately 1 inch width bridge of viable skin. Plantar heel ulceration down to subcutaneous tissue with serous drainage and 60% fibrotic base measures 2.6 X 2.1 X 0.4cm Reduced erythema and edema left foot and ankle noted DP and PT 2/4; CFT less than 3 seconds Sensation intact to light touch Cultures from surgical debridement on 3/10/20: 
Culture result: Abnormal       Preliminary HEAVY STREPTOCOCCI, BETA HEMOLYTIC GROUP B Penicillin and ampicillin are drugs of choice for treatment of beta-hemolytic streptococcal infections. Susceptibility testing of penicillins and beta-lactams approved by the FDA for treatment of beta-hemolytic streptococcal infections need not be performed routinely, because nonsusceptible isolates are extremely rare. CLSI 2012 Culture result: Abnormal       Preliminary MODERATE STREPTOCOCCI, BETA HEMOLYTIC GROUP G Penicillin and ampicillin are drugs of choice for treatment of beta-hemolytic streptococcal infections. Susceptibility testing of penicillins and beta-lactams approved by the FDA for treatment of beta-hemolytic streptococcal infections need not be performed routinely, because nonsusceptible isolates are extremely rare. CLSI 2012 Culture result: Abnormal       Preliminary LIGHT POSSIBLE STAPHYLOCOCCUS AUREUS Culture result: Abnormal       Preliminary SCANT PROTEUS SPECIES Labs: 
Recent Labs  
  03/12/20 
0354 03/11/20 
3305 WBC  --  13.5*  
CREA 1.88* 1.91* BUN 24* 26* HGB  --  8.5* HCT  --  26.7* INR 1.3* 1.3*  138  
K 4.0 3.6 * 111* CO2 21 18*  
 GLU 85 133*

## 2020-03-12 NOTE — PROGRESS NOTES
Nephrology Progress Note [facility] 
[email or web address]                  Phone - [phone] Patient: Tomasa Tadeo Date- 3/12/2020 Admit Date: 3/8/2020 YOB: 1960 CC: Follow up for  CKD, HTN Subjective: Interval History:  
- 
Pt denies any complaints ROS:- as above, plus: no SOB. No chest pain. No abd pain. No joint pain. No skin rashes. No N/V. IMPRESSION:  
· CKD 3 LIKELY due to DM nephropathy and HTN nephrosclerosis- bl. Cr. 1.8-2.1 Renal function stable with Bun/creat 24/1.9 on 3/12. · Hyponatremia due to fluid overload, resolved. Na 137 on 3/12 · Edema · hypertension · Diabetic foot · Proteinuria likely due to DM nephropathy and HTN nephroscl- urine pr/cr 1.5 g/g · Metabolic acidosis · Hypokalemia · Anemia due to renal failure and iron defi - iron sats 10% · Vit d defi PLAN:  
· Continue bumex  1 mg bid · Continue po bicarb · continue iv iron · continue epogen · Follow bmp · Hold clonidine,  
· Hold metolazone Physical exam:  
GEN:  In no distress NECK:  Supple, no thyromegaly RESP: lungs clear; no resp distress CVS: RRR,S1,S2 ABDO:  soft , non tender, No mass NEURO: alert; normal speech; no tremor EXT: trace edema Left Foot dressing + Care Plan discussed with: patient Objective:  
Visit Vitals /51 (BP 1 Location: Left arm, BP Patient Position: At rest) Pulse 71 Temp 97.8 °F (36.6 °C) Resp 18 Ht 5' 9\" (1.753 m) Wt 115.3 kg (254 lb 4.8 oz) SpO2 98% BMI 37.55 kg/m² Last 3 Recorded Weights in this Encounter 03/10/20 1730 20 7975 20 5086 Weight: 112.5 kg (248 lb) 115.5 kg (254 lb 9.6 oz) 115.3 kg (254 lb 4.8 oz) 03/10 1901 -  0700 In:  [P.O.:1220; I.V.:850] Out: 9567 [FUVK] Intake/Output Summary (Last 24 hours) at 3/12/2020 1319 Last data filed at 3/12/2020 3354 Gross per 24 hour Intake 590 ml Output 800 ml Net -210 ml Chart reviewed. Pertinent Notes reviewed. Medication list  reviewed Current Facility-Administered Medications Medication  warfarin (COUMADIN) tablet 2 mg  VANCOMYCIN INFORMATION NOTE  iron sucrose (VENOFER) injection 200 mg  epoetin viet-epbx (RETACRIT) 12,000 Units combo injection  HYDROcodone-acetaminophen (NORCO) 5-325 mg per tablet 1 Tab  docusate sodium (COLACE) capsule 100 mg  bumetanide (BUMEX) injection 1 mg  
 vancomycin (VANCOCIN) 1,000 mg in 0.9% sodium chloride (MBP/ADV) 250 mL  
 HYDROmorphone (PF) (DILAUDID) injection 0.5 mg  
 sodium hypochlorite (HALF STRENGTH DAKIN'S) 0.25% irrigation (bottle)  cefepime (MAXIPIME) 2 g in 0.9% sodium chloride (MBP/ADV) 100 mL  dilTIAZem (CARDIZEM) IR tablet 60 mg  
 insulin lispro (HUMALOG) injection 5 Units  WARFARIN INFORMATION NOTE (COUMADIN)  sodium bicarbonate tablet 650 mg  
 sodium chloride (NS) flush 5-10 mL  busPIRone (BUSPAR) tablet 10 mg  
 cholecalciferol (VITAMIN D3) (1000 Units /25 mcg) tablet 5 Tab  cyanocobalamin (VITAMIN B12) tablet 1,000 mcg  doxazosin (CARDURA) tablet 4 mg  hydrALAZINE (APRESOLINE) tablet 100 mg  
 insulin glargine (LANTUS) injection 20 Units  metoprolol succinate (TOPROL-XL) XL tablet 100 mg  potassium chloride SR (KLOR-CON 10) tablet 10 mEq  sertraline (ZOLOFT) tablet 50 mg  
 sodium chloride (NS) flush 5-40 mL  sodium chloride (NS) flush 5-40 mL  acetaminophen (TYLENOL) tablet 650 mg  
 naloxone (NARCAN) injection 0.4 mg  
 glucose chewable tablet 16 g  
 dextrose (D50W) injection syrg 12.5-25 g  
 glucagon (GLUCAGEN) injection 1 mg  
 insulin lispro (HUMALOG) injection Data Review : 
Recent Labs  
  03/12/20 
0354 03/11/20 
0358 03/10/20 
0158  138 134* K 4.0 3.6 3.8 * 111* 106 CO2 21 18* 18*  
BUN 24* 26* 25* CREA 1.88* 1.91* 1.88* GLU 85 133* 123* CA 8.9 8.6 9.0 Recent Labs  
  03/11/20 
0358 WBC 13.5* HGB 8.5* HCT 26.7*  
 Recent Labs  
  03/11/20 
0358 TIBC 183* PSAT 10* No results for input(s): CPK, CKNDX, TROIQ in the last 72 hours. No lab exists for component: CPKMB Lab Results Component Value Date/Time Color YELLOW/STRAW 03/08/2020 05:00 PM  
 Appearance CLOUDY (A) 03/08/2020 05:00 PM  
 Specific gravity 1.019 03/08/2020 05:00 PM  
 pH (UA) 5.0 03/08/2020 05:00 PM  
 Protein 100 (A) 03/08/2020 05:00 PM  
 Glucose 100 (A) 03/08/2020 05:00 PM  
 Ketone 15 (A) 03/08/2020 05:00 PM  
 Bilirubin NEGATIVE  03/08/2020 05:00 PM  
 Urobilinogen 0.2 03/08/2020 05:00 PM  
 Nitrites NEGATIVE  03/08/2020 05:00 PM  
 Leukocyte Esterase SMALL (A) 03/08/2020 05:00 PM  
 Epithelial cells FEW 03/08/2020 05:00 PM  
 Bacteria NEGATIVE  03/08/2020 05:00 PM  
 WBC 0-4 03/08/2020 05:00 PM  
 RBC 0-5 03/08/2020 05:00 PM  
 
Lab Results Component Value Date/Time Culture result: (A) 03/10/2020 07:15 PM  
  HEAVY STREPTOCOCCI, BETA HEMOLYTIC GROUP B Penicillin and ampicillin are drugs of choice for treatment of beta-hemolytic streptococcal infections. Susceptibility testing of penicillins and beta-lactams approved by the FDA for treatment of beta-hemolytic streptococcal infections need not be performed routinely, because nonsusceptible isolates are extremely rare. CLSI 2012 Culture result: (A) 03/10/2020 07:15 PM  
  MODERATE STREPTOCOCCI, BETA HEMOLYTIC GROUP G Penicillin and ampicillin are drugs of choice for treatment of beta-hemolytic streptococcal infections. Susceptibility testing of penicillins and beta-lactams approved by the FDA for treatment of beta-hemolytic streptococcal infections need not be performed routinely, because nonsusceptible isolates are extremely rare. CLSI 2012  Culture result: LIGHT POSSIBLE STAPHYLOCOCCUS AUREUS (A) 03/10/2020 07:15 PM  
 Culture result: SCANT PROTEUS SPECIES (A) 03/10/2020 07:15 PM  
 
 Lab Results Component Value Date/Time Specimen Description: RENEE 04/09/2014 12:27 PM  
 Specimen Description: RENEE 10/28/2013 05:00 PM  
 Specimen Description: [facility] 10/28/2013 03:09 PM  
 
Lab Results Component Value Date/Time Creatinine, urine 45.70 03/09/2020 09:50 PM  
 
Results from [facility] Encounter encounter on 03/08/20 XR FOOT LT MIN 3 V Narrative EXAM: XR FOOT LT MIN 3 V 
 
INDICATION: L foot swelling; eval for osteo changes. COMPARISON: 3/3/2020. FINDINGS: Three views of the left foot demonstrate no bone erosion, periostitis 
or other evidence of osteomyelitis. There are 2 plantar soft tissue ulcers, 
without foreign body. There is chronic deformity of the distal portion of the second toe and mid foot 
osteoarthrosis. No acute fracture is seen. Arterial calcification is noted. Impression IMPRESSION: No signal change. Plantar soft tissue ulcers. No apparent 
osteomyelitis. RENAL USG 
TECHNIQUE:  
Routine ultrasound images of the kidneys and retroperitoneum were obtained. 
  
FINDINGS:  
The right kidney measures 11.5 cm in length. The left kidney measures 10.7 cm in 
length. There is increased echogenicity of the kidneys bilaterally, consistent with 
medical renal disease. There is thinning of the right renal cortex. No renal 
calculus is seen. There is no hydronephrosis. No renal cyst or solid mass is 
evident.   
  
The abdominal aorta and common iliac arteries were obscured by gas. The 
visualized portion of the IVC appears patent. The urinary bladder demonstrates 
no filling defect.  
  
IMPRESSION IMPRESSION:  
Echogenic kidneys, consistent with medical renal disease. Cortical thinning on 
the right. No hydronephrosis. . 
  
 
                Edil Vaughan MD 
[facility] 
 www. [email or web address] / [employer] [address] [place], [address] Phone - [phone] Fax - [phone]

## 2020-03-12 NOTE — PROGRESS NOTES
Bedside and Verbal shift change report received from [facility]. Report included the following information SBAR, Kardex and Recent Results.

## 2020-03-12 NOTE — CONSULTS
Spencer Ahuja is a 60 y.o. female admitted with left leg cellulitis. She has a PMH of afib; pacemaker/Breast cancer/CKD/DM2/ HTN/ obesity (gastric bypass in 2008). A1C: 7.5. Her blood sugar profile has continued to be very good on 20 units of Lantus daily with minimal lispro dosing. Examination Weight 115 kg Blood pressure 127/51 Pulse 71 Impression type 2 diabetes mellitus with good glucose control on 20 units of Lantus insulin. Recommendation there seems no need to change the current insulin dosing. She is doing very well on this regimen and has for some time.

## 2020-03-12 NOTE — PROGRESS NOTES
Hospitalist Progress Note NAME: Melody Munson :  1960 MRN:  [id number] Assessment / Plan: 
Atrial fibrillation/RVR 
- s/p Cardizem bolus and remains on Cardizem gtt with good rate control -- now transitioning to oral diltiazem 
- Continue metoprolol 
- Continue coumadin; pharmacy consult for dosing 
- supra therapeutic resolved  INR 1.3 today - Cardiology consult reviewed and appreciated  
  
Left leg cellulitis and ulceration of the left foot 
- Continue with empiric Vancomycin and Cefepime - Blood cultures with no growth to date - CT leg noted - Seen in consultation by Dr. Kory Walsh with tentative plan for surgery tomorrow (will need to discuss INR with him) - Venous US LLE negative for DVT  
- Pain control prn - Elevated limb as needed  
- POD#2 Debridement left foot wound and bone biopsy . 
  
Elevated troponin - Likely due to demand with rapid atrial fibrillation - Downtrending  
  
CKD4 - Appears to be stable at baseline creatinine - Renally dose medications  
- sodium bicarbonate 650 mg bid - BUMEX 1 MG BID per nephrology - Give IV iron - Epogen - Nephrology on board. Greatly appreciate input  
  
HTN 
- Continue home medications: hydralazine,Toprol, Cardura , cardizem  
- hold clonidine and  Metolazone  
  
DM - Lantus 20 units every day - Novolog 5 units with meals - ISS 
 
  
HLD 
  
Anxiety  
- Continue Zoloft 
  
CHF 
- No exacerbation - On  Bumex  
  
SSS 
- Pacemaker Morbid obesity due to BMI >35 with DM, HTN / Body mass index is 37.55 kg/m². Code status: Full Prophylaxis: anticoagulated with warfarin Recommended Disposition: Home w/Family Subjective: Chief Complaint / Reason for Physician Visit: follow up rapid atrial fibrillation and cellulitis, foot ulcers Feeling okay this morning except for pain in her left leg and foot. No chest pain, palpitations, shortness of breath. No nausea or vomiting. Review of Systems: Symptom Y/N Comments  Symptom Y/N Comments Fever/Chills n   Chest Pain n   
Poor Appetite    Edema y Cough n   Abdominal Pain n   
Sputum    Joint Pain SOB/CHOW n   Pruritis/Rash Nausea/vomit n   Tolerating PT/OT Diarrhea n   Tolerating Diet Constipation n   Other Could NOT obtain due to:   
 
Objective: VITALS:  
Last 24hrs VS reviewed since prior progress note. Most recent are: 
Patient Vitals for the past 24 hrs: 
 Temp Pulse Resp BP SpO2  
03/12/20 0726 97.5 °F (36.4 °C) 85 18 125/54 97 % 03/12/20 0337 97.6 °F (36.4 °C) 78 18 122/54 97 % 03/11/20 2313 97.4 °F (36.3 °C) 72 18 123/55 100 % 03/11/20 1914 97.3 °F (36.3 °C) 60 18 111/43 98 % 03/11/20 1742  61  102/43   
03/11/20 1518 97.6 °F (36.4 °C) 67 18 100/43 98 % 03/11/20 1323 97.6 °F (36.4 °C) 65 18 125/47 95 % 03/11/20 1042 97.9 °F (36.6 °C) 77 16 123/62 98 % Intake/Output Summary (Last 24 hours) at 3/12/2020 1011 Last data filed at 3/12/2020 4625 Gross per 24 hour Intake 890 ml Output 1100 ml Net -210 ml PHYSICAL EXAM: 
General: WD, WN. Alert, cooperative, no acute distress   
EENT:  EOMI. Anicteric sclerae. MMM Resp:  CTA bilaterally, no wheezing or rales. No accessory muscle use CV:  Irregularly irregular  rhythm, +LLE edema 1-2+ GI:  Soft, Non distended, Non tender.  +Bowel sounds Neurologic:  Alert and oriented X 3, normal speech, Psych:   Good insight. Not anxious nor agitated Skin:  No jaundice. Erythema of distal left lower extremity and foot, with heat and tenderness. Reviewed most current lab test results and cultures  YES Reviewed most current radiology test results   YES Review and summation of old records today    NO Reviewed patient's current orders and MAR    YES 
PMH/SH reviewed - no change compared to H&P 
________________________________________________________________________ Care Plan discussed with: 
  Comments Patient x Family RN x   
 Care Manager x Consultant     
                 x Multidiciplinary team rounds were held today with , nursing, pharmacist and clinical coordinator. Patient's plan of care was discussed; medications were reviewed and discharge planning was addressed. ________________________________________________________________________ Total NON critical care TIME:  36   Minutes Total CRITICAL CARE TIME Spent:   Minutes non procedure based Comments >50% of visit spent in counseling and coordination of care    
________________________________________________________________________ Curly Gray MD  
 
Procedures: see electronic medical records for all procedures/Xrays and details which were not copied into this note but were reviewed prior to creation of Plan. LABS: 
I reviewed today's most current labs and imaging studies. Pertinent labs include: 
Recent Labs  
  03/11/20 
0358 WBC 13.5* HGB 8.5* HCT 26.7*  
 Recent Labs  
  03/12/20 
0354 03/11/20 
0358 03/10/20 
1532 03/10/20 
0158  138  --  134* K 4.0 3.6  --  3.8 * 111*  --  106 CO2 21 18*  --  18* GLU 85 133*  --  123* BUN 24* 26*  --  25* CREA 1.88* 1.91*  --  1.88* CA 8.9 8.6  --  9.0 INR 1.3* 1.3* 1.4* 2.3* Signed: Curly Gray MD

## 2020-03-13 ENCOUNTER — ANESTHESIA EVENT (OUTPATIENT)
Dept: SURGERY | Age: 60
DRG: 623 | End: 2020-03-13
Payer: COMMERCIAL

## 2020-03-13 LAB
ANION GAP SERPL CALC-SCNC: 5 MMOL/L (ref 5–15)
BACTERIA SPEC CULT: ABNORMAL
BACTERIA SPEC CULT: NORMAL
BASOPHILS # BLD: 0.1 K/UL (ref 0–0.1)
BASOPHILS NFR BLD: 1 % (ref 0–1)
BUN SERPL-MCNC: 24 MG/DL (ref 6–20)
BUN/CREAT SERPL: 13 (ref 12–20)
CALCIUM SERPL-MCNC: 8.7 MG/DL (ref 8.5–10.1)
CHLORIDE SERPL-SCNC: 109 MMOL/L (ref 97–108)
CO2 SERPL-SCNC: 23 MMOL/L (ref 21–32)
CREAT SERPL-MCNC: 1.86 MG/DL (ref 0.55–1.02)
DIFFERENTIAL METHOD BLD: ABNORMAL
EOSINOPHIL # BLD: 0 K/UL (ref 0–0.4)
EOSINOPHIL NFR BLD: 0 % (ref 0–7)
ERYTHROCYTE [DISTWIDTH] IN BLOOD BY AUTOMATED COUNT: 15.6 % (ref 11.5–14.5)
GLUCOSE BLD STRIP.AUTO-MCNC: 113 MG/DL (ref 65–100)
GLUCOSE BLD STRIP.AUTO-MCNC: 85 MG/DL (ref 65–100)
GLUCOSE BLD STRIP.AUTO-MCNC: 87 MG/DL (ref 65–100)
GLUCOSE BLD STRIP.AUTO-MCNC: 91 MG/DL (ref 65–100)
GLUCOSE SERPL-MCNC: 109 MG/DL (ref 65–100)
GRAM STN SPEC: ABNORMAL
GRAM STN SPEC: ABNORMAL
HCT VFR BLD AUTO: 30.2 % (ref 35–47)
HGB BLD-MCNC: 9.3 G/DL (ref 11.5–16)
IMM GRANULOCYTES # BLD AUTO: 0.3 K/UL (ref 0–0.04)
IMM GRANULOCYTES NFR BLD AUTO: 2 % (ref 0–0.5)
INR PPP: 1.5 (ref 0.9–1.1)
LYMPHOCYTES # BLD: 0.9 K/UL (ref 0.8–3.5)
LYMPHOCYTES NFR BLD: 7 % (ref 12–49)
MCH RBC QN AUTO: 29 PG (ref 26–34)
MCHC RBC AUTO-ENTMCNC: 30.8 G/DL (ref 30–36.5)
MCV RBC AUTO: 94.1 FL (ref 80–99)
MONOCYTES # BLD: 0.9 K/UL (ref 0–1)
MONOCYTES NFR BLD: 7 % (ref 5–13)
NEUTS SEG # BLD: 11.2 K/UL (ref 1.8–8)
NEUTS SEG NFR BLD: 83 % (ref 32–75)
NRBC # BLD: 0.03 K/UL (ref 0–0.01)
NRBC BLD-RTO: 0.2 PER 100 WBC
PLATELET # BLD AUTO: 551 K/UL (ref 150–400)
PMV BLD AUTO: 10.1 FL (ref 8.9–12.9)
POTASSIUM SERPL-SCNC: 3.8 MMOL/L (ref 3.5–5.1)
PROTHROMBIN TIME: 14.8 SEC (ref 9–11.1)
RBC # BLD AUTO: 3.21 M/UL (ref 3.8–5.2)
RBC MORPH BLD: ABNORMAL
SERVICE CMNT-IMP: ABNORMAL
SERVICE CMNT-IMP: ABNORMAL
SERVICE CMNT-IMP: NORMAL
SODIUM SERPL-SCNC: 137 MMOL/L (ref 136–145)
WBC # BLD AUTO: 13.4 K/UL (ref 3.6–11)

## 2020-03-13 PROCEDURE — 65660000000 HC RM CCU STEPDOWN

## 2020-03-13 PROCEDURE — 74011250636 HC RX REV CODE- 250/636: Performed by: INTERNAL MEDICINE

## 2020-03-13 PROCEDURE — 74011250636 HC RX REV CODE- 250/636: Performed by: PODIATRIST

## 2020-03-13 PROCEDURE — 74011000250 HC RX REV CODE- 250: Performed by: PODIATRIST

## 2020-03-13 PROCEDURE — 85610 PROTHROMBIN TIME: CPT

## 2020-03-13 PROCEDURE — 85025 COMPLETE CBC W/AUTO DIFF WBC: CPT

## 2020-03-13 PROCEDURE — 80048 BASIC METABOLIC PNL TOTAL CA: CPT

## 2020-03-13 PROCEDURE — 74011000258 HC RX REV CODE- 258: Performed by: PODIATRIST

## 2020-03-13 PROCEDURE — 74011250637 HC RX REV CODE- 250/637: Performed by: INTERNAL MEDICINE

## 2020-03-13 PROCEDURE — 74011250637 HC RX REV CODE- 250/637: Performed by: PODIATRIST

## 2020-03-13 PROCEDURE — 36415 COLL VENOUS BLD VENIPUNCTURE: CPT

## 2020-03-13 PROCEDURE — 74011250637 HC RX REV CODE- 250/637: Performed by: FAMILY MEDICINE

## 2020-03-13 PROCEDURE — 74011636637 HC RX REV CODE- 636/637: Performed by: PODIATRIST

## 2020-03-13 PROCEDURE — 82962 GLUCOSE BLOOD TEST: CPT

## 2020-03-13 RX ORDER — INSULIN GLARGINE 100 [IU]/ML
10 INJECTION, SOLUTION SUBCUTANEOUS DAILY
Status: DISCONTINUED | OUTPATIENT
Start: 2020-03-14 | End: 2020-03-14

## 2020-03-13 RX ORDER — WARFARIN 2 MG/1
4 TABLET ORAL ONCE
Status: COMPLETED | OUTPATIENT
Start: 2020-03-13 | End: 2020-03-13

## 2020-03-13 RX ADMIN — SODIUM BICARBONATE 650 MG: 650 TABLET ORAL at 17:17

## 2020-03-13 RX ADMIN — SODIUM BICARBONATE 650 MG: 650 TABLET ORAL at 09:03

## 2020-03-13 RX ADMIN — MELATONIN 5 TABLET: at 09:03

## 2020-03-13 RX ADMIN — CEFEPIME HYDROCHLORIDE 2 G: 2 INJECTION, POWDER, FOR SOLUTION INTRAVENOUS at 21:40

## 2020-03-13 RX ADMIN — Medication 10 ML: at 15:02

## 2020-03-13 RX ADMIN — Medication 10 ML: at 21:40

## 2020-03-13 RX ADMIN — METOPROLOL SUCCINATE 100 MG: 50 TABLET, EXTENDED RELEASE ORAL at 09:03

## 2020-03-13 RX ADMIN — HYDROMORPHONE HYDROCHLORIDE 0.5 MG: 1 INJECTION, SOLUTION INTRAMUSCULAR; INTRAVENOUS; SUBCUTANEOUS at 21:34

## 2020-03-13 RX ADMIN — DILTIAZEM HYDROCHLORIDE 60 MG: 60 TABLET, FILM COATED ORAL at 17:17

## 2020-03-13 RX ADMIN — DILTIAZEM HYDROCHLORIDE 60 MG: 60 TABLET, FILM COATED ORAL at 09:04

## 2020-03-13 RX ADMIN — BUSPIRONE HYDROCHLORIDE 10 MG: 10 TABLET ORAL at 09:03

## 2020-03-13 RX ADMIN — HYDRALAZINE HYDROCHLORIDE 100 MG: 50 TABLET, FILM COATED ORAL at 05:29

## 2020-03-13 RX ADMIN — DOXAZOSIN 4 MG: 2 TABLET ORAL at 09:03

## 2020-03-13 RX ADMIN — HYOSCYAMINE SULFATE: 16 SOLUTION at 12:32

## 2020-03-13 RX ADMIN — Medication 10 ML: at 05:33

## 2020-03-13 RX ADMIN — CEFEPIME HYDROCHLORIDE 2 G: 2 INJECTION, POWDER, FOR SOLUTION INTRAVENOUS at 09:05

## 2020-03-13 RX ADMIN — CYANOCOBALAMIN TAB 500 MCG 1000 MCG: 500 TAB at 09:03

## 2020-03-13 RX ADMIN — DOCUSATE SODIUM 100 MG: 100 CAPSULE, LIQUID FILLED ORAL at 17:18

## 2020-03-13 RX ADMIN — WARFARIN SODIUM 4 MG: 2 TABLET ORAL at 17:18

## 2020-03-13 RX ADMIN — SODIUM BICARBONATE 650 MG: 650 TABLET ORAL at 21:40

## 2020-03-13 RX ADMIN — HYDRALAZINE HYDROCHLORIDE 100 MG: 50 TABLET, FILM COATED ORAL at 15:02

## 2020-03-13 RX ADMIN — EPOETIN ALFA-EPBX 12000 UNITS: 10000 INJECTION, SOLUTION INTRAVENOUS; SUBCUTANEOUS at 21:45

## 2020-03-13 RX ADMIN — IRON SUCROSE 200 MG: 20 INJECTION, SOLUTION INTRAVENOUS at 09:04

## 2020-03-13 RX ADMIN — DOCUSATE SODIUM 100 MG: 100 CAPSULE, LIQUID FILLED ORAL at 09:03

## 2020-03-13 RX ADMIN — BUMETANIDE 1 MG: 0.25 INJECTION INTRAMUSCULAR; INTRAVENOUS at 09:04

## 2020-03-13 RX ADMIN — HYDRALAZINE HYDROCHLORIDE 100 MG: 50 TABLET, FILM COATED ORAL at 21:40

## 2020-03-13 RX ADMIN — POTASSIUM CHLORIDE 10 MEQ: 750 TABLET, FILM COATED, EXTENDED RELEASE ORAL at 09:03

## 2020-03-13 RX ADMIN — INSULIN GLARGINE 20 UNITS: 100 INJECTION, SOLUTION SUBCUTANEOUS at 09:02

## 2020-03-13 RX ADMIN — HYDROMORPHONE HYDROCHLORIDE 0.5 MG: 1 INJECTION, SOLUTION INTRAMUSCULAR; INTRAVENOUS; SUBCUTANEOUS at 04:27

## 2020-03-13 RX ADMIN — DILTIAZEM HYDROCHLORIDE 60 MG: 60 TABLET, FILM COATED ORAL at 12:31

## 2020-03-13 RX ADMIN — BUMETANIDE 1 MG: 0.25 INJECTION INTRAMUSCULAR; INTRAVENOUS at 17:17

## 2020-03-13 RX ADMIN — SERTRALINE HYDROCHLORIDE 50 MG: 50 TABLET ORAL at 12:31

## 2020-03-13 NOTE — PROGRESS NOTES
7:43 AM Received bedside verbal report from [facility]. 
 
11:43 AM Ordered nutritional supplement for the patient as patient has not been eating much throughout the day. Discussed with Dr. Darell Bradshaw and he said to order glucerna but patient does not like the shake so ensure pudding and magic cups ordered with meals. MD aware. 6:38 PM Spoke with Dr. Rosalba Zaman at the bedside. He wants patient to work with PT on Monday. Order placed. Post op shoe at the bedside if she wants to get up and go to the bathroom.

## 2020-03-13 NOTE — PROGRESS NOTES
Problem: Falls - Risk of 
Goal: *Absence of Falls Description: Document Janna Mckinney Fall Risk and appropriate interventions in the flowsheet. Outcome: Progressing Towards Goal 
Note: Fall Risk Interventions: 
Mobility Interventions: Bed/chair exit alarm Medication Interventions: Assess postural VS orthostatic hypotension, Teach patient to arise slowly Elimination Interventions: Bed/chair exit alarm, Call light in reach, Patient to call for help with toileting needs History of Falls Interventions: Consult care management for discharge planning, Door open when patient unattended Problem: Pressure Injury - Risk of 
Goal: *Prevention of pressure injury Description: Document Valdemar Scale and appropriate interventions in the flowsheet. Outcome: Progressing Towards Goal 
Note: Pressure Injury Interventions: 
Sensory Interventions: Assess changes in LOC, Assess need for specialty bed, Check visual cues for pain, Float heels, Keep linens dry and wrinkle-free, Maintain/enhance activity level, Minimize linen layers Moisture Interventions: Assess need for specialty bed, Maintain skin hydration (lotion/cream), Minimize layers Activity Interventions: Increase time out of bed Mobility Interventions: Float heels, HOB 30 degrees or less Nutrition Interventions: Document food/fluid/supplement intake Friction and Shear Interventions: Minimize layers Problem: Hypertension Goal: *Blood pressure within specified parameters Outcome: Progressing Towards Goal

## 2020-03-13 NOTE — PROGRESS NOTES
Nephrology Progress Note [facility] 
www. [facility].[employer]                  Phone - [phone] Patient: Tomasa Tadeo Date- 3/13/2020 Admit Date: 3/8/2020 YOB: 1960 CC: Follow up for ckd, HTN Subjective: Interval History:  
- na stable Cr. Stable No c/o sob, No c/o chest pain, No c/o nausea or vomiting No c/o  fever. ROS:- as above IMPRESSION:  
· CKD  3 LIKELY due to DM nephropathy and HTN nephrosclerosis- bl. Cr. 1.8-2.1 · Hyponatremia due to fluid overload · edema · hypertension · Diabetic foot · Proteinuria likely due to DM nephropathy and HTN nephroscl- urine pr/cr 1.5 g/g · Metabolic acidosis · Hypokalemia · anemia iron defi - iron sats 10% · Vit d defi PLAN:  
· Continue bumex to 1 mg bid · Continue po bicarb · Continue iv iron · Continue epogen · Follow bmp · Continue hydralazine and cardura · Hold clonidine,  
· Hold metolazone Physical exam:  
GENERAL ASSESSMENT: NAD 
CHEST: CTA b/l, no wheezing HEART: S1,S2,RRR 
ABDOMEN: Soft,Non tender NEURO: Non focal, normal speech EXTREMITY: + EDEMA Left Foot dressing + Care Plan discussed with: patient, 
Objective:  
Visit Vitals /59 (BP 1 Location: Left arm, BP Patient Position: At rest) Pulse 83 Temp 97.8 °F (36.6 °C) Resp 16 Ht 5' 9\" (1.753 m) Wt 113.3 kg (249 lb 12.5 oz) SpO2 98% BMI 36.89 kg/m² Last 3 Recorded Weights in this Encounter 03/12/20 8834 03/12/20 0595 03/13/20 0533 Weight: 115.5 kg (254 lb 9.6 oz) 115.3 kg (254 lb 4.8 oz) 113.3 kg (249 lb 12.5 oz) 03/11 1901 - 03/13 0700 In: 0 [P.O.:240; I.V.:350] Out: 1675 [SGFRM:0741] Intake/Output Summary (Last 24 hours) at 3/13/2020 1121 Last data filed at 3/13/2020 7903 Gross per 24 hour Intake 120 ml Output 1075 ml Net -955 ml Chart reviewed. Pertinent Notes reviewed. Medication list  reviewed Current Facility-Administered Medications Medication  [START ON 3/14/2020] vancomycin (VANCOCIN) 1,000 mg in 0.9% sodium chloride (MBP/ADV) 250 mL  iron sucrose (VENOFER) injection 200 mg  epoetin viet-epbx (RETACRIT) 12,000 Units combo injection  HYDROcodone-acetaminophen (NORCO) 5-325 mg per tablet 1 Tab  docusate sodium (COLACE) capsule 100 mg  bumetanide (BUMEX) injection 1 mg  
 HYDROmorphone (PF) (DILAUDID) injection 0.5 mg  
 sodium hypochlorite (HALF STRENGTH DAKIN'S) 0.25% irrigation (bottle)  cefepime (MAXIPIME) 2 g in 0.9% sodium chloride (MBP/ADV) 100 mL  dilTIAZem (CARDIZEM) IR tablet 60 mg  WARFARIN INFORMATION NOTE (COUMADIN)  sodium bicarbonate tablet 650 mg  
 sodium chloride (NS) flush 5-10 mL  busPIRone (BUSPAR) tablet 10 mg  
 cholecalciferol (VITAMIN D3) (1000 Units /25 mcg) tablet 5 Tab  cyanocobalamin (VITAMIN B12) tablet 1,000 mcg  doxazosin (CARDURA) tablet 4 mg  hydrALAZINE (APRESOLINE) tablet 100 mg  
 insulin glargine (LANTUS) injection 20 Units  metoprolol succinate (TOPROL-XL) XL tablet 100 mg  potassium chloride SR (KLOR-CON 10) tablet 10 mEq  sertraline (ZOLOFT) tablet 50 mg  
 sodium chloride (NS) flush 5-40 mL  sodium chloride (NS) flush 5-40 mL  acetaminophen (TYLENOL) tablet 650 mg  
 naloxone (NARCAN) injection 0.4 mg  
 glucose chewable tablet 16 g  
 dextrose (D50W) injection syrg 12.5-25 g  
 glucagon (GLUCAGEN) injection 1 mg  
 insulin lispro (HUMALOG) injection Data Review : 
Recent Labs  
  03/13/20 
0410 03/12/20 
0354 03/11/20 
0965  137 138  
K 3.8 4.0 3.6 * 111* 111* CO2 23 21 18* BUN 24* 24* 26* CREA 1.86* 1.88* 1.91* * 85 133* CA 8.7 8.9 8.6 Recent Labs  
  03/13/20 
0410 03/11/20 
4102 WBC 13.4* 13.5* HGB 9.3* 8.5* HCT 30.2* 26.7*  
* 361 Recent Labs  
  03/11/20 
0358 TIBC 183* PSAT 10* No results for input(s): CPK, CKNDX, TROIQ in the last 72 hours. No lab exists for component: CPKMB Lab Results Component Value Date/Time Color YELLOW/STRAW 03/08/2020 05:00 PM  
 Appearance CLOUDY (A) 03/08/2020 05:00 PM  
 Specific gravity 1.019 03/08/2020 05:00 PM  
 pH (UA) 5.0 03/08/2020 05:00 PM  
 Protein 100 (A) 03/08/2020 05:00 PM  
 Glucose 100 (A) 03/08/2020 05:00 PM  
 Ketone 15 (A) 03/08/2020 05:00 PM  
 Bilirubin NEGATIVE  03/08/2020 05:00 PM  
 Urobilinogen 0.2 03/08/2020 05:00 PM  
 Nitrites NEGATIVE  03/08/2020 05:00 PM  
 Leukocyte Esterase SMALL (A) 03/08/2020 05:00 PM  
 Epithelial cells FEW 03/08/2020 05:00 PM  
 Bacteria NEGATIVE  03/08/2020 05:00 PM  
 WBC 0-4 03/08/2020 05:00 PM  
 RBC 0-5 03/08/2020 05:00 PM  
 
Lab Results Component Value Date/Time Culture result: CHECKING FOR POSSIBLE ANAEROBES 
 03/10/2020 07:15 PM  
 Culture result: LIGHT STAPHYLOCOCCUS AUREUS (A) 03/10/2020 07:15 PM  
 Culture result: SCANT PROTEUS MIRABILIS (A) 03/10/2020 07:15 PM  
 Culture result: (A) 03/10/2020 07:15 PM  
  HEAVY STREPTOCOCCI, BETA HEMOLYTIC GROUP B Penicillin and ampicillin are drugs of choice for treatment of beta-hemolytic streptococcal infections. Susceptibility testing of penicillins and beta-lactams approved by the FDA for treatment of beta-hemolytic streptococcal infections need not be performed routinely, because nonsusceptible isolates are extremely rare. CLSI 2012 Culture result: (A) 03/10/2020 07:15 PM  
  HEAVY STREPTOCOCCI, BETA HEMOLYTIC GROUP G Penicillin and ampicillin are drugs of choice for treatment of beta-hemolytic streptococcal infections. Susceptibility testing of penicillins and beta-lactams approved by the FDA for treatment of beta-hemolytic streptococcal infections need not be performed routinely, because nonsusceptible isolates are extremely rare. CLSI 2012 Lab Results Component Value Date/Time  Specimen Description: NARES 04/09/2014 12:27 PM  
 Specimen Description: RENEE 10/28/2013 05:00 PM  
 Specimen Description: [facility] 10/28/2013 03:09 PM  
 
Lab Results Component Value Date/Time Creatinine, urine 45.70 03/09/2020 09:50 PM  
 
Results from [facility] Encounter encounter on 03/08/20 XR FOOT LT MIN 3 V Narrative EXAM: XR FOOT LT MIN 3 V 
 
INDICATION: L foot swelling; eval for osteo changes. COMPARISON: 3/3/2020. FINDINGS: Three views of the left foot demonstrate no bone erosion, periostitis 
or other evidence of osteomyelitis. There are 2 plantar soft tissue ulcers, 
without foreign body. There is chronic deformity of the distal portion of the second toe and mid foot 
osteoarthrosis. No acute fracture is seen. Arterial calcification is noted. Impression IMPRESSION: No signal change. Plantar soft tissue ulcers. No apparent 
osteomyelitis. RENAL USG 
TECHNIQUE:  
Routine ultrasound images of the kidneys and retroperitoneum were obtained. 
  
FINDINGS:  
The right kidney measures 11.5 cm in length. The left kidney measures 10.7 cm in 
length. There is increased echogenicity of the kidneys bilaterally, consistent with 
medical renal disease. There is thinning of the right renal cortex. No renal 
calculus is seen. There is no hydronephrosis. No renal cyst or solid mass is 
evident.   
  
The abdominal aorta and common iliac arteries were obscured by gas. The 
visualized portion of the IVC appears patent. The urinary bladder demonstrates 
no filling defect.  
  
IMPRESSION IMPRESSION:  
Echogenic kidneys, consistent with medical renal disease. Cortical thinning on 
the right. No hydronephrosis. . 
  
 
                Prudence MD Jer 
[facility] 
 www. [email or web address] [place] / [employer] [address] [place], [address] [place] Phone - [phone] Fax - [phone]

## 2020-03-13 NOTE — PROGRESS NOTES
Pharmacy Dosing of Warfarin Indication: A. fib Goal INR: 2-3 PTA Warfarin Dose: 4 mg on Sun, Wed and 2 mg on all other days Concurrent anticoagulants: none Concurrent antiplatelet: none Major Interacting Medications Drugs that may increase INR: none Drugs that may decrease INR: Phytonadione 2.5 mg on 3/9 Conditions that may increase/decrease INR (CHF, C. diff, cirrhosis, thyroid disorder, hypoalbuminemia): None Labs: 
Recent Labs  
  03/13/20 
0410 03/12/20 
0354 03/11/20 
6509 INR 1.5* 1.3* 1.3* HGB 9.3*  --  8.5* *  --  361 Impression/Plan:   
Warfarin 4 mg for tonight INR daily, CBC w/o diff every other day Thanks Cristhian Strickland, PHARMD 
 
[email or web address]%[email or web address]. pdf

## 2020-03-13 NOTE — PROGRESS NOTES
1900: Bedside shift change report given to Patrick Stone (oncoming nurse) by Amparo Barrera (offgoing nurse). Report included the following information SBAR and Kardex. 0700: 
Bedside shift change report GIVEN TO Jorge Dennis RN. Report included the following information SBAR and Kardex. SIGNIFICANT CHANGES DURING SHIFT:   
 
 
CONCERNS TO ADDRESS WITH MD:  
 
 
 
 
[facility] NURSING NOTE Admission Date 3/8/2020 Admission Diagnosis Atrial fibrillation with RVR ([address] [I48.91] Left leg cellulitis [E35.302] Consults IP CONSULT TO CARDIOLOGY 
IP CONSULT TO PODIATRY IP CONSULT TO NEPHROLOGY 
IP CONSULT TO HOSPITALIST Cardiac Monitoring [x] Yes [] No  
  
Purposeful Hourly Rounding [x] Yes   
Rayne Score Total Score: 2 Rayne score 3 or > [] Bed Alarm [] Avasys [] 1:1 sitter [] Patient refused (Signed refusal form in chart) Valdemar Score Valdemar Score: 16 Valdemar score 14 or < [] PMT consult [] Wound Care consult  
 []  Specialty bed  [] Nutrition consult Influenza Vaccine Received Flu Vaccine for Current Season (usually Sept-March): Yes Oxygen needs? [x] Room air Oxygen @  []1L    []2L    []3L   []4L    []5L   []6L via NC Chronic home O2 use? [] Yes [] No 
Perform O2 challenge test and document in progress note using smartphrase (.Homeoxygen) Last bowel movement Last Bowel Movement Date: 03/10/20 Urinary Catheter External Female Catheter 03/10/20-Urine Output (mL): 300 ml LDAs Peripheral IV 03/10/20 Left Forearm (Active) Site Assessment Clean, dry, & intact 3/13/2020  3:20 AM  
Phlebitis Assessment 0 3/13/2020  3:20 AM  
Infiltration Assessment 0 3/13/2020  3:20 AM  
Dressing Status Clean, dry, & intact 3/13/2020  3:20 AM  
Dressing Type Tape;Transparent 3/13/2020  3:20 AM  
Hub Color/Line Status Pink;Flushed 3/13/2020  3:20 AM  
Action Taken Other (comment) 3/10/2020 10:17 AM  
      
External Female Catheter 03/10/20 (Active) Site Assessment Clean, dry, & intact 3/11/2020  9:30 PM  
Repositioned Yes 3/11/2020  9:30 PM  
Perineal Care Yes 3/11/2020  9:30 PM  
Wick Changed Yes 3/11/2020  9:30 PM  
Suction Canister/Tubing Changed No 3/11/2020  9:30 PM  
Urine Output (mL) 300 ml 3/11/2020  9:30 PM  
            
  
Readmission Risk Assessment Tool Score High Risk   
      
 30 Total Score 3 Has Seen PCP in Last 6 Months (Yes=3, No=0)  
 4 IP Visits Last 12 Months (1-3=4, 4=9, >4=11) 23 Charlson Comorbidity Score (Age + Comorbid Conditions) Criteria that do not apply:  
 . Living with Significant Other. Assisted Living. LTAC. SNF. or  
Rehab Patient Length of Stay (>5 days = 3) Pt. Coverage (Medicare=5 , Medicaid, or Self-Pay=4) Expected Length of Stay 3d 7h Actual Length of Stay 5

## 2020-03-13 NOTE — PROGRESS NOTES
Hospitalist Progress Note NAME: Morales Barron :  1960 MRN:  [id number] Assessment / Plan: 
Left leg cellulitis and ulceration of the left foot 
- Continue with empiric Vancomycin and Cefepime  For now - Blood cultures with no growth to date - CT leg noted - Venous US LLE negative for DVT Wound Cx= growing MSSA & sensitive Proteus 
 
- Pain control prn - Elevated limb as needed  
- POD#3 Debridement left foot wound and bone biopsy . Awaiting Wound Cx/Bone Bx results for further plans NWB on L foot noted 
?need for wound vacc indicated Follow up with Dr Josemanuel Geronimo for further plans 
  
  
DM- hypoglycemic due to poor PO intake noted 
decrease Lantus to half at 10 units daily today Holding scheduled Novolog 5 units with meals for now Cont SSI lispro 
  
Atrial fibrillation/RVR 
- s/p Cardizem bolus and remains on Cardizem gtt with good rate control -- now transitioning to oral diltiazem 
- Continue metoprolol 
- Continue coumadin; pharmacy consult for dosing 
- supra therapeutic resolved  INR 1.5 today - Cardiology consult reviewed and appreciated  
 
Elevated troponin - Likely due to demand with rapid atrial fibrillation - Downtrending  
  
CKD4- Cr stable at ~ 1.8 
- Appears to be stable at baseline creatinine - Renally dose medications  
- sodium bicarbonate 650 mg bid - BUMEX 1 MG BID per nephrology - Give IV iron - Epogen - Nephrology on board. Greatly appreciate input  
  
HTN 
- Continue home medications: hydralazine,Toprol, Cardura , cardizem  
- hold clonidine and  Metolazone HLD 
  
Anxiety  
- Continue Zoloft 
  
CHF 
- No exacerbation - On  Bumex  
  
SSS 
- Pacemaker Morbid obesity due to BMI >35 with DM, HTN / Body mass index is 36.89 kg/m². Code status: Full Prophylaxis: anticoagulated with warfarin Recommended Disposition: Home w/Family Subjective: Chief Complaint / Reason for Physician Visit: follow up rapid atrial fibrillation and cellulitis, foot ulcers Feeling okay this morning except for pain in her left leg and foot. No chest pain, palpitations, shortness of breath. No nausea or vomiting. Review of Systems: 
Symptom Y/N Comments  Symptom Y/N Comments Fever/Chills n   Chest Pain n   
Poor Appetite    Edema y Cough n   Abdominal Pain n   
Sputum    Joint Pain SOB/CHOW n   Pruritis/Rash Nausea/vomit n   Tolerating PT/OT Diarrhea n   Tolerating Diet Constipation n   Other Could NOT obtain due to:   
 
Objective: VITALS:  
Last 24hrs VS reviewed since prior progress note. Most recent are: 
Patient Vitals for the past 24 hrs: 
 Temp Pulse Resp BP SpO2  
03/13/20 1132 98.1 °F (36.7 °C) 85 16 148/61 98 % 03/13/20 0714 97.8 °F (36.6 °C) 83 16 145/59 98 % 03/13/20 0525    136/57   
03/13/20 0306 97.7 °F (36.5 °C) 73 16 123/58 100 % 03/12/20 2209 97.4 °F (36.3 °C) 75 16 124/52 100 % 03/12/20 1915 97.4 °F (36.3 °C) 62 13 128/65 99 % 03/12/20 1641 96.6 °F (35.9 °C) 96 12 142/62 97 % 03/12/20 1449 97.5 °F (36.4 °C) 78 18 140/55 100 % 03/12/20 1344  79  139/61  Intake/Output Summary (Last 24 hours) at 3/13/2020 1240 Last data filed at 3/13/2020 2750 Gross per 24 hour Intake 120 ml Output 1075 ml Net -955 ml PHYSICAL EXAM: 
General: WD, WN. Alert, cooperative, no acute distress   
EENT:  EOMI. Anicteric sclerae. MMM Resp:  CTA bilaterally, no wheezing or rales. No accessory muscle use CV:  Irregularly irregular  rhythm, +LLE edema 1-2+ GI:  Soft, Non distended, Non tender.  +Bowel sounds Neurologic:  Alert and oriented X 3, normal speech, Psych:   Good insight. Not anxious nor agitated Skin:  No jaundice. Erythema of distal left lower extremity and foot, with heat and tenderness. Reviewed most current lab test results and cultures  YES Reviewed most current radiology test results   YES Review and summation of old records today    NO 
 Reviewed patient's current orders and MAR    YES 
PMH/SH reviewed - no change compared to H&P 
________________________________________________________________________ Care Plan discussed with: 
  Comments Patient x Family RN x Care Manager x Consultant     
                 x Multidiciplinary team rounds were held today with , nursing, pharmacist and clinical coordinator. Patient's plan of care was discussed; medications were reviewed and discharge planning was addressed. ________________________________________________________________________ Total NON critical care TIME:  36   Minutes Total CRITICAL CARE TIME Spent:   Minutes non procedure based Comments >50% of visit spent in counseling and coordination of care    
________________________________________________________________________ Tato Vaughan MD  
 
Procedures: see electronic medical records for all procedures/Xrays and details which were not copied into this note but were reviewed prior to creation of Plan. LABS: 
I reviewed today's most current labs and imaging studies. Pertinent labs include: 
Recent Labs  
  03/13/20 0410 03/11/20 
0358 WBC 13.4* 13.5* HGB 9.3* 8.5* HCT 30.2* 26.7*  
* 361 Recent Labs  
  03/13/20 0410 03/12/20 
0354 03/11/20 
8429  137 138  
K 3.8 4.0 3.6 * 111* 111* CO2 23 21 18* * 85 133* BUN 24* 24* 26* CREA 1.86* 1.88* 1.91* CA 8.7 8.9 8.6 INR 1.5* 1.3* 1.3* Signed: Tato Vaughan MD

## 2020-03-13 NOTE — DIABETES MGMT
Ms. Miley Armendariz BG has remained stable on home dose 20units of daily lantus. She has had no hyper or hypoglycemia since admission. Diabetes Management Team to sign off at this point as patient BG remains stable. Please re-consult us if patient needs change. Thank you for including us in her care. Signed By: DARRYL Frank March 13, 2020

## 2020-03-13 NOTE — PROGRESS NOTES
Podiatry Subjective: Pt relates pain left foot, but no new issues. Patient is a 60 y.o.  female who is being seen for open wound left foot. Workup has revealed no evidence of osteomyelitis left 5th metatarsal. 
 
Patient Active Problem List  
 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 ([address] 03/03/2020  Traumatic hematoma of foot, left, initial encounter 02/25/2020  Type 2 diabetes mellitus with left diabetic foot infection ([address] 10/29/2019  Foot deformity, acquired, left 09/24/2019  Foot deformity, right 09/24/2019  Pes cavus 09/24/2019  Cellulitis of right lower extremity 08/09/2019  Diabetic ulcer of right midfoot associated with type 2 diabetes mellitus, with fat layer exposed ([address] 08/06/2019  Diabetic ulcer of left heel associated with type 2 diabetes mellitus, with fat layer exposed ([address] 06/21/2019  Open breast wound, left, initial encounter 06/07/2019  Venous stasis ulcer of right lower leg with edema of right lower leg (HCC) 11/14/2018  Diabetic polyneuropathy associated with type 2 diabetes mellitus ([address] 11/13/2018  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, associated with type 2 diabetes mellitus ([address] 04/25/2018  Morbid obesity with BMI of 40.0-44.9, adult ([address] 05/01/2017  Controlled type 2 diabetes mellitus with stage 4 chronic kidney disease, with long-term current use of insulin ([address] 01/16/2017  PAF (paroxysmal atrial fibrillation) ([address] 11/28/2016  Anemia in stage 4 chronic kidney disease ([address] 11/28/2016  Essential hypertension 08/26/2016  Systolic murmur 86/96/0035  CKD (chronic kidney disease), stage IV ([address] 06/09/2012  Mixed hyperlipidemia 05/22/2012  Long term (current) use of anticoagulants 04/11/2012  S/P cardiac pacemaker procedure 04/03/2012  Sick sinus syndrome ([facility] 04/02/2012  Status post ablation of atrial fibrillation 12/15/2011  Fe deficiency anemia 04/14/2010  
 History of breast cancer 04/13/2010  Asthma 04/13/2010 Past Medical History:  
Diagnosis Date  Acquired lymphedema Right arm  Arrhythmia  Asthma  Atrial fibrillation ([facility] Dr. Gwen Govea and Dr. Yared Pritchard [facility])  Cancer ([facility] bilateral breast  
 Chronic kidney disease  Diabetes mellitus type II   
 Dizziness  Essential hypertension  Hyperlipidemia  Hypertension  Long term (current) use of anticoagulants  Morbid obesity ([facility] 3/14/2012  Nausea & vomiting  Osteoarthritis  Pacemaker  Sick sinus syndrome ([facility] Past Surgical History:  
Procedure Laterality Date  ABDOMEN SURGERY PROC UNLISTED    
 gastric bypass  GASTRIC BYPASS,OBESE<150CM [facility]  
 [facility]  HX AFIB ABLATION    
 HX CARPAL TUNNEL RELEASE [address] RIGHT MODIFIED RADICAL   
 HX MOHS PROCEDURES  1995  
 right  HX ORTHOPAEDIC Right   
 knee surgery  HX PACEMAKER    
 IR INSERT TUNL CVC W PORT OVER 5 YEARS    
 LAMINECTOMY,CERVICAL  1994  
 NEEDLE BIOPSY LIVER,W OTHR [address]  8.21.07  
 RI [address] AND 1994  RI TRABECULOPLASTY BY LASER SURGERY    
 US GUIDE ASP OVARIAN CYST ABD APPROACH  8.21.07  
 RIGHT Family History Problem Relation Age of Onset  Cancer Mother  Heart Disease Mother  Alcohol abuse Father  Diabetes Brother  Hypertension Brother Social History Tobacco Use  Smoking status: Never Smoker  Smokeless tobacco: Never Used Substance Use Topics  Alcohol use: Yes Comment: rare Allergies Allergen Reactions  Ace Inhibitors Cough  Amlodipine Swelling  Avapro [Irbesartan] Unable to Obtain  Bactrim [Sulfamethoxazole-Trimethoprim] Other (comments) Sore mouth  Captopril Cough  Carvedilol Diarrhea [address]  Morphine Nausea and Vomiting and Swelling  Penicillins Hives Tolerated cefepime 1/2020  Pravachol [Pravastatin] Nausea Only  Seafood [Shellfish Containing Products] Hives  Singulair [Montelukast] Other (comments)  
  palpitations Prior to Admission medications Medication Sig Start Date End Date Taking? Authorizing Provider  
warfarin (COUMADIN) 4 mg tablet Take 2 mg by mouth five (5) days a week. Take 1 tablet (4 mg) on Sun and Wed and a half tablet (2 mg) the other 5 days. Yes Provider, Historical  
warfarin (COUMADIN) 4 mg tablet Take 4 mg by mouth two (2) times a week. Take 1 tablet (4 mg) on Sun and Wed and a half tablet (2 mg) the other 5 days. Yes Provider, Historical  
acetaminophen 500 mg tab 500 mg, diphenhydrAMINE 25 mg cap 25 mg Take 2 Doses by mouth nightly. Provider, Historical  
potassium chloride SR (KLOR-CON 10) 10 mEq tablet Take 10 mEq by mouth daily. Indications: Patient states she intends to take this medication untl she discusses with Dr. Karen Porter on 12-12-19. Provider, Historical  
insulin glargine (LANTUS U-100 INSULIN) 100 unit/mL injection Inject 20 units daily 11/10/19   Trisha Craft MD  
hydrALAZINE (APRESOLINE) 100 mg tablet TAKE ONE TABLET BY MOUTH THREE TIMES A DAY 10/21/19   Trisha Craft MD  
sertraline (ZOLOFT) 50 mg tablet TAKE ONE AND ONE-HALF (1 & 1/2) TABLET BY MOUTH DAILY 8/22/19   Trisha Craft MD  
bumetanide (BUMEX) 1 mg tablet Take 2 mg by mouth daily. Provider, Historical  
cloNIDine HCl (CATAPRES) 0.3 mg tablet Take 0.6 mg by mouth nightly.     Provider, Historical  
metoprolol succinate (TOPROL-XL) 100 mg tablet TAKE TWO TABLETS BY MOUTH DAILY 19   Ana Max MD  
doxazosin (CARDURA) 4 mg tablet TAKE ONE TABLET BY MOUTH ONCE NIGHTLY 19   Ana Max MD  
busPIRone (BUSPAR) 10 mg tablet TAKE ONE TABLET BY MOUTH TWICE A DAY 19   Ana Max MD  
metolazone (ZAROXOLYN) 5 mg tablet Take 1 Tab by mouth daily as needed (take if develop increased LE edema, weight gain > 5 pounds. ). 4/10/14   Thai Melara NP  
cholecalciferol, VITAMIN D3, (VITAMIN D3) 5,000 unit tab tablet Take 5,000 Units by mouth daily. Provider, Historical  
vitamin A 8,000 unit capsule Take 8,000 Units by mouth daily. Provider, Historical  
insulin lispro (HUMALOG) 100 unit/mL kwikpen 2 units with dinner for sugars over 200 1/3/14   Ana Max MD  
cyanocobalamin (VITAMIN B-12) 1,000 mcg sublingual tablet Take 1,000 mcg by mouth daily. Provider, Historical  
 
 
Review of Systems AO x3 Objective:  
 
Patient Vitals for the past 8 hrs: 
 BP Temp Pulse Resp SpO2  
20 1511 124/57 96.7 °F (35.9 °C) 76 16 99 % 20 1132 148/61 98.1 °F (36.7 °C) 85 16 98 % Temp (24hrs), Av.5 °F (36.4 °C), Min:96.7 °F (35.9 °C), Max:98.1 °F (36.7 °C) Physical Exam Lower Extremity Wound base left foot is fibrotic, but no pus drainage or mal odor. Exposed periosteum left 5th metatarsal. 
DP and PT palpable bilaterally Sensation intact to light touch. +POP left foot near wound. C&S: MSSA, Strep, Proteus mirabilis Bone biopsy: Benign bone left 5th metatarsal 
 
Data Review:  
Recent Results (from the past 24 hour(s)) GLUCOSE, POC Collection Time: 20  8:00 PM  
Result Value Ref Range Glucose (POC) 171 (H) 65 - 100 mg/dL Performed by Luci Valero RN   
GLUCOSE, POC Collection Time: 20  8:57 PM  
Result Value Ref Range Glucose (POC) 149 (H) 65 - 100 mg/dL Performed by Sandrita Orellana (PCT) METABOLIC PANEL, BASIC Collection Time: 20  4:10 AM  
Result Value Ref Range Sodium 137 136 - 145 mmol/L Potassium 3.8 3.5 - 5.1 mmol/L Chloride 109 (H) 97 - 108 mmol/L  
 CO2 23 21 - 32 mmol/L Anion gap 5 5 - 15 mmol/L Glucose 109 (H) 65 - 100 mg/dL BUN 24 (H) 6 - 20 MG/DL Creatinine 1.86 (H) 0.55 - 1.02 MG/DL  
 BUN/Creatinine ratio 13 12 - 20 GFR est AA 33 (L) >60 ml/min/1.73m2 GFR est non-AA 28 (L) >60 ml/min/1.73m2 Calcium 8.7 8.5 - 10.1 MG/DL PROTHROMBIN TIME + INR Collection Time: 03/13/20  4:10 AM  
Result Value Ref Range INR 1.5 (H) 0.9 - 1.1 Prothrombin time 14.8 (H) 9.0 - 11.1 sec CBC WITH AUTOMATED DIFF Collection Time: 03/13/20  4:10 AM  
Result Value Ref Range WBC 13.4 (H) 3.6 - 11.0 K/uL  
 RBC 3.21 (L) 3.80 - 5.20 M/uL HGB 9.3 (L) 11.5 - 16.0 g/dL HCT 30.2 (L) 35.0 - 47.0 % MCV 94.1 80.0 - 99.0 FL  
 MCH 29.0 26.0 - 34.0 PG  
 MCHC 30.8 30.0 - 36.5 g/dL  
 RDW 15.6 (H) 11.5 - 14.5 % PLATELET 327 (H) 396 - 400 K/uL MPV 10.1 8.9 - 12.9 FL  
 NRBC 0.2 (H) 0  WBC ABSOLUTE NRBC 0.03 (H) 0.00 - 0.01 K/uL NEUTROPHILS 83 (H) 32 - 75 % LYMPHOCYTES 7 (L) 12 - 49 % MONOCYTES 7 5 - 13 % EOSINOPHILS 0 0 - 7 % BASOPHILS 1 0 - 1 % IMMATURE GRANULOCYTES 2 (H) 0.0 - 0.5 % ABS. NEUTROPHILS 11.2 (H) 1.8 - 8.0 K/UL  
 ABS. LYMPHOCYTES 0.9 0.8 - 3.5 K/UL  
 ABS. MONOCYTES 0.9 0.0 - 1.0 K/UL  
 ABS. EOSINOPHILS 0.0 0.0 - 0.4 K/UL  
 ABS. BASOPHILS 0.1 0.0 - 0.1 K/UL  
 ABS. IMM. GRANS. 0.3 (H) 0.00 - 0.04 K/UL  
 DF SMEAR SCANNED    
 RBC COMMENTS ANISOCYTOSIS 1+ 
    
 RBC COMMENTS MACROCYTOSIS 1+ 
    
 RBC COMMENTS MICROCYTOSIS 1+ 
    
 RBC COMMENTS OVALOCYTES 1+ GLUCOSE, POC Collection Time: 03/13/20  6:58 AM  
Result Value Ref Range Glucose (POC) 91 65 - 100 mg/dL Performed by Estephania Albright (PCT) GLUCOSE, POC Collection Time: 03/13/20 11:33 AM  
Result Value Ref Range Glucose (POC) 85 65 - 100 mg/dL Performed by Amador Brandt (PCT) GLUCOSE, POC  
 Collection Time: 03/13/20  4:18 PM  
Result Value Ref Range Glucose (POC) 113 (H) 65 - 100 mg/dL Performed by Rehan Black (PCT) Impression:  
Diabetic ulcer to bone left foot Recommendation: No need for amputation due to negative bone biopsy. However, the exposed bone is a high risk for osteomyelitis. I recommend placing a skin substitute graft left lateral foot to cover the bone. The plantar wound is too deep for a graft, and will require additional wound care. The pt agrees, and I have placed her on the OR schedule for 2/14/20 at 8 AM.

## 2020-03-13 NOTE — PROGRESS NOTES
Initial Nutrition Assessment: 
 
INTERVENTIONS/RECOMMENDATIONS:  
· Meals/Snacks: General/healthful diet:  Continue CCD for BG management. · Supplements: Commercial supplement:  Agree with addition of nutritional supplements. ASSESSMENT:  
3/13:  Chart reviewed; med noted for a-fib, CKD, DM, morbid obesity. Noted to have decreased appetite. Nutritional supplements have been added by RN (ensure pudding and magic cups) as pt does not like glucerna shakes. BG in good control on admission. Diet Order: Consistent carb 
% Eaten:   
Patient Vitals for the past 72 hrs: 
 % Diet Eaten 03/13/20 0905 0 % 03/11/20 0732 50 %  
  
%Supplement Intake:   
Pertinent Medications: [x]Reviewed []Other Pertinent Labs: [x]Reviewed []Other Food Allergies: [x]None []Other Last BM:    [x]Active     []Hyperactive  []Hypoactive       [] Absent BS Skin:    [x] Intact   [] Incision  [] Breakdown  [] Other: Anthropometrics:  
Height: 5' 9\" (175.3 cm) Weight: 113.3 kg (249 lb 12.5 oz) IBW (%IBW):   ( ) UBW (%UBW):   (  %) Last Weight Metrics: 
Weight Loss Metrics 3/13/2020 1/24/2020 1/14/2020 12/12/2019 12/6/2019 11/13/2019 11/1/2019 Today's Wt 249 lb 12.5 oz 246 lb 250 lb 3.6 oz 263 lb 251 lb 1.7 oz 254 lb 8 oz -  
BMI 36.89 kg/m2 35.3 kg/m2 35.9 kg/m2 38.84 kg/m2 37.08 kg/m2 37.58 kg/m2 37.51 kg/m2 BMI: Body mass index is 36.89 kg/m². This BMI is indicative of: 
 []Underweight    []Normal    []Overweight    [x] Obesity   [] Extreme Obesity (BMI>40) Estimated Nutrition Needs (Based on):  
2119 Kcals/day(BMR (1766) x 1. 2AF) , 113 g(1.0 g/kg bw) Protein Carbohydrate: At Least 130 g/day  Fluids: 2100 mL/day (1ml/kcal) NUTRITION DIAGNOSES:  
Problem:  Inadequate oral intake Etiology: related to decreased appetite Signs/Symptoms: as evidenced by PO 25% of meals, ONS added NUTRITION INTERVENTIONS: 
Meals/Snacks: General/healthful diet   Supplements: Commercial supplement GOAL:  
 Pt. Cleared for discharge. Verbalizes understanding of discharge instructions. Left ambulatory, alert, oriented and in no acute distress   PO intake trend >50% of meals next 3-5 days LEARNING NEEDS (Diet, Food/Nutrient-Drug Interaction):  
 [x] None Identified 
 [] Identified and Education Provided/Documented 
 [] Identified and Pt declined/was not appropriate Cultureal, Sabianist, OR Ethnic Dietary Needs:  
 [x] None Identified 
 [] Identified and Addressed 
 
 [x] Interdisciplinary Care Plan Reviewed/Documented  
 [x] Discharge Planning:   Continue CCD per BG management MONITORING /EVALUATION:  
Food/Nutrient Intake Outcomes: Total energy intake Physical Signs/Symptoms Outcomes: Weight/weight change, Glucose profile NUTRITION RISK:  
 [x] Patient At Nutritional Risk        [] Patient Not At Nutritional Risk PT SEEN FOR:  
 []  MD Consult: []Calorie Count []Diabetic Diet Education []Diet Education []Electrolyte Management []General Nutrition Management and Supplements []Management of Tube Feeding []TPN Recommendations []  RN Referral:  []MST score >=2 
   []Enteral/Parenteral Nutrition PTA []Pregnant: Gestational DM or Multigestation 
   []Pressure Ulcer/Wound Care needs 
     
[]  Low BMI [x]  OG Benson RD Pager [phone] Weekend Pager [phone]

## 2020-03-13 NOTE — PROGRESS NOTES
Problem: Falls - Risk of 
Goal: *Absence of Falls Description: Document Surendra Going Fall Risk and appropriate interventions in the flowsheet. Outcome: Progressing Towards Goal 
Note: Fall Risk Interventions: 
Mobility Interventions: Bed/chair exit alarm, Patient to call before getting OOB, PT Consult for mobility concerns Medication Interventions: Bed/chair exit alarm, Patient to call before getting OOB, Teach patient to arise slowly Elimination Interventions: Call light in reach, Bed/chair exit alarm, Patient to call for help with toileting needs, Toileting schedule/hourly rounds History of Falls Interventions: Bed/chair exit alarm Problem: Infection - Risk of, Surgical Site Infection Goal: *Absence of surgical site infection signs and symptoms Outcome: Progressing Towards Goal 
  
Problem: Infection - Risk of, Surgical Site Infection Goal: *Absence of surgical site infection signs and symptoms Outcome: Progressing Towards Goal 
  
Problem: Infection - Risk of, Surgical Site Infection Goal: *Absence of surgical site infection signs and symptoms Outcome: Progressing Towards Goal

## 2020-03-14 ENCOUNTER — ANESTHESIA (OUTPATIENT)
Dept: SURGERY | Age: 60
DRG: 623 | End: 2020-03-14
Payer: COMMERCIAL

## 2020-03-14 ENCOUNTER — APPOINTMENT (OUTPATIENT)
Dept: CT IMAGING | Age: 60
DRG: 623 | End: 2020-03-14
Attending: INTERNAL MEDICINE
Payer: COMMERCIAL

## 2020-03-14 LAB
AMMONIA PLAS-SCNC: 25 UMOL/L
ANION GAP SERPL CALC-SCNC: 6 MMOL/L (ref 5–15)
BACTERIA SPEC CULT: ABNORMAL
BUN SERPL-MCNC: 24 MG/DL (ref 6–20)
BUN/CREAT SERPL: 12 (ref 12–20)
CALCIUM SERPL-MCNC: 9.1 MG/DL (ref 8.5–10.1)
CHLORIDE SERPL-SCNC: 109 MMOL/L (ref 97–108)
CO2 SERPL-SCNC: 23 MMOL/L (ref 21–32)
CREAT SERPL-MCNC: 1.95 MG/DL (ref 0.55–1.02)
FOLATE SERPL-MCNC: 8.5 NG/ML (ref 5–21)
GLUCOSE BLD STRIP.AUTO-MCNC: 108 MG/DL (ref 65–100)
GLUCOSE BLD STRIP.AUTO-MCNC: 86 MG/DL (ref 65–100)
GLUCOSE BLD STRIP.AUTO-MCNC: 91 MG/DL (ref 65–100)
GLUCOSE BLD STRIP.AUTO-MCNC: 97 MG/DL (ref 65–100)
GLUCOSE BLD STRIP.AUTO-MCNC: 98 MG/DL (ref 65–100)
GLUCOSE SERPL-MCNC: 97 MG/DL (ref 65–100)
INR PPP: 1.8 (ref 0.9–1.1)
POTASSIUM SERPL-SCNC: 3.8 MMOL/L (ref 3.5–5.1)
PROTHROMBIN TIME: 17.9 SEC (ref 9–11.1)
SERVICE CMNT-IMP: ABNORMAL
SERVICE CMNT-IMP: ABNORMAL
SERVICE CMNT-IMP: NORMAL
SODIUM SERPL-SCNC: 138 MMOL/L (ref 136–145)
VIT B12 SERPL-MCNC: 1403 PG/ML (ref 193–986)

## 2020-03-14 PROCEDURE — 80048 BASIC METABOLIC PNL TOTAL CA: CPT

## 2020-03-14 PROCEDURE — 74011250637 HC RX REV CODE- 250/637: Performed by: PODIATRIST

## 2020-03-14 PROCEDURE — 74011000258 HC RX REV CODE- 258: Performed by: PODIATRIST

## 2020-03-14 PROCEDURE — 82962 GLUCOSE BLOOD TEST: CPT

## 2020-03-14 PROCEDURE — 36415 COLL VENOUS BLD VENIPUNCTURE: CPT

## 2020-03-14 PROCEDURE — 95816 EEG AWAKE AND DROWSY: CPT | Performed by: PSYCHIATRY & NEUROLOGY

## 2020-03-14 PROCEDURE — 74011250637 HC RX REV CODE- 250/637: Performed by: FAMILY MEDICINE

## 2020-03-14 PROCEDURE — 70450 CT HEAD/BRAIN W/O DYE: CPT

## 2020-03-14 PROCEDURE — 74011250636 HC RX REV CODE- 250/636: Performed by: INTERNAL MEDICINE

## 2020-03-14 PROCEDURE — 65660000000 HC RM CCU STEPDOWN

## 2020-03-14 PROCEDURE — 74011000250 HC RX REV CODE- 250: Performed by: PODIATRIST

## 2020-03-14 PROCEDURE — 74011250636 HC RX REV CODE- 250/636: Performed by: PODIATRIST

## 2020-03-14 PROCEDURE — 82607 VITAMIN B-12: CPT

## 2020-03-14 PROCEDURE — 85610 PROTHROMBIN TIME: CPT

## 2020-03-14 PROCEDURE — 82140 ASSAY OF AMMONIA: CPT

## 2020-03-14 PROCEDURE — 74011250637 HC RX REV CODE- 250/637: Performed by: INTERNAL MEDICINE

## 2020-03-14 RX ORDER — SODIUM CHLORIDE 9 MG/ML
INJECTION, SOLUTION INTRAVENOUS
Status: DISCONTINUED
Start: 2020-03-14 | End: 2020-03-14

## 2020-03-14 RX ORDER — ENOXAPARIN SODIUM 100 MG/ML
100 INJECTION SUBCUTANEOUS EVERY 12 HOURS
Status: DISCONTINUED | OUTPATIENT
Start: 2020-03-14 | End: 2020-03-15

## 2020-03-14 RX ADMIN — DOXAZOSIN 4 MG: 2 TABLET ORAL at 12:09

## 2020-03-14 RX ADMIN — CEFEPIME HYDROCHLORIDE 2 G: 2 INJECTION, POWDER, FOR SOLUTION INTRAVENOUS at 14:50

## 2020-03-14 RX ADMIN — Medication 3 AMPULE: at 07:57

## 2020-03-14 RX ADMIN — MELATONIN 5 TABLET: at 12:09

## 2020-03-14 RX ADMIN — ACETAMINOPHEN 650 MG: 325 TABLET ORAL at 21:32

## 2020-03-14 RX ADMIN — METOPROLOL SUCCINATE 100 MG: 50 TABLET, EXTENDED RELEASE ORAL at 12:09

## 2020-03-14 RX ADMIN — POTASSIUM CHLORIDE 10 MEQ: 750 TABLET, FILM COATED, EXTENDED RELEASE ORAL at 12:09

## 2020-03-14 RX ADMIN — VANCOMYCIN HYDROCHLORIDE 1000 MG: 1 INJECTION, POWDER, LYOPHILIZED, FOR SOLUTION INTRAVENOUS at 02:06

## 2020-03-14 RX ADMIN — BUMETANIDE 1 MG: 0.25 INJECTION INTRAMUSCULAR; INTRAVENOUS at 12:09

## 2020-03-14 RX ADMIN — Medication 10 ML: at 19:32

## 2020-03-14 RX ADMIN — WARFARIN SODIUM 3 MG: 2 TABLET ORAL at 19:31

## 2020-03-14 RX ADMIN — SERTRALINE HYDROCHLORIDE 50 MG: 50 TABLET ORAL at 12:16

## 2020-03-14 RX ADMIN — Medication 1 AMPULE: at 21:31

## 2020-03-14 RX ADMIN — DILTIAZEM HYDROCHLORIDE 60 MG: 60 TABLET, FILM COATED ORAL at 15:54

## 2020-03-14 RX ADMIN — HYDRALAZINE HYDROCHLORIDE 100 MG: 50 TABLET, FILM COATED ORAL at 05:14

## 2020-03-14 RX ADMIN — DILTIAZEM HYDROCHLORIDE 60 MG: 60 TABLET, FILM COATED ORAL at 12:09

## 2020-03-14 RX ADMIN — HYDRALAZINE HYDROCHLORIDE 100 MG: 50 TABLET, FILM COATED ORAL at 14:20

## 2020-03-14 RX ADMIN — HYDROCODONE BITARTRATE AND ACETAMINOPHEN 1 TABLET: 5; 325 TABLET ORAL at 14:20

## 2020-03-14 RX ADMIN — IRON SUCROSE 200 MG: 20 INJECTION, SOLUTION INTRAVENOUS at 12:09

## 2020-03-14 RX ADMIN — Medication 10 ML: at 05:16

## 2020-03-14 RX ADMIN — ENOXAPARIN SODIUM 100 MG: 100 INJECTION SUBCUTANEOUS at 12:15

## 2020-03-14 RX ADMIN — CYANOCOBALAMIN TAB 500 MCG 1000 MCG: 500 TAB at 12:09

## 2020-03-14 RX ADMIN — BUMETANIDE 1 MG: 0.25 INJECTION INTRAMUSCULAR; INTRAVENOUS at 19:31

## 2020-03-14 RX ADMIN — SODIUM BICARBONATE 650 MG: 650 TABLET ORAL at 15:54

## 2020-03-14 RX ADMIN — HYDROMORPHONE HYDROCHLORIDE 0.5 MG: 1 INJECTION, SOLUTION INTRAMUSCULAR; INTRAVENOUS; SUBCUTANEOUS at 02:06

## 2020-03-14 RX ADMIN — Medication 10 ML: at 21:32

## 2020-03-14 RX ADMIN — BUSPIRONE HYDROCHLORIDE 10 MG: 10 TABLET ORAL at 12:09

## 2020-03-14 RX ADMIN — SODIUM BICARBONATE 650 MG: 650 TABLET ORAL at 12:09

## 2020-03-14 RX ADMIN — DEXTROSE MONOHYDRATE 12.5 G: 25 INJECTION, SOLUTION INTRAVENOUS at 08:22

## 2020-03-14 NOTE — PROGRESS NOTES
Podiatry Subjective: Pt in PACU holding before Sx. She is lethargic and poorly oriented. Patient is a 60 y.o.  female who is being seen for ulcer and cellulitis left foot. Patient Active Problem List  
 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 ([address] 03/03/2020  Traumatic hematoma of foot, left, initial encounter 02/25/2020  Type 2 diabetes mellitus with left diabetic foot infection ([address] 10/29/2019  Foot deformity, acquired, left 09/24/2019  Foot deformity, right 09/24/2019  Pes cavus 09/24/2019  Cellulitis of right lower extremity 08/09/2019  Diabetic ulcer of right midfoot associated with type 2 diabetes mellitus, with fat layer exposed ([address] 08/06/2019  Diabetic ulcer of left heel associated with type 2 diabetes mellitus, with fat layer exposed ([address] 06/21/2019  Open breast wound, left, initial encounter 06/07/2019  Venous stasis ulcer of right lower leg with edema of right lower leg (HCC) 11/14/2018  Diabetic polyneuropathy associated with type 2 diabetes mellitus ([address] 11/13/2018  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, associated with type 2 diabetes mellitus ([address] 04/25/2018  Morbid obesity with BMI of 40.0-44.9, adult ([address] 05/01/2017  Controlled type 2 diabetes mellitus with stage 4 chronic kidney disease, with long-term current use of insulin ([address] 01/16/2017  PAF (paroxysmal atrial fibrillation) ([address] 11/28/2016  Anemia in stage 4 chronic kidney disease ([address] 11/28/2016  Essential hypertension 08/26/2016  Systolic murmur 97/85/5202  CKD (chronic kidney disease), stage IV ([address] 06/09/2012  Mixed hyperlipidemia 05/22/2012  Long term (current) use of anticoagulants 04/11/2012  S/P cardiac pacemaker procedure 04/03/2012  Sick sinus syndrome ([facility] 04/02/2012  Status post ablation of atrial fibrillation 12/15/2011  Fe deficiency anemia 04/14/2010  
 History of breast cancer 04/13/2010  Asthma 04/13/2010 Past Medical History:  
Diagnosis Date  Acquired lymphedema Right arm  Arrhythmia  Asthma  Atrial fibrillation ([facility] Dr. Bon Patel and Dr. Rachel alves [facility])  Cancer ([facility] bilateral breast  
 Chronic kidney disease  Diabetes mellitus type II   
 Dizziness  Essential hypertension  Hyperlipidemia  Hypertension  Long term (current) use of anticoagulants  Morbid obesity ([facility] 3/14/2012  Nausea & vomiting  Osteoarthritis  Pacemaker  Sick sinus syndrome ([facility] Past Surgical History:  
Procedure Laterality Date  ABDOMEN SURGERY PROC UNLISTED    
 gastric bypass  GASTRIC BYPASS,OBESE<150CM SAW-EN-Y  7/08  
 [facility]  HX AFIB ABLATION    
 HX CARPAL TUNNEL RELEASE [address] RIGHT MODIFIED RADICAL   
 HX MOHS PROCEDURES  1995  
 right  HX ORTHOPAEDIC Right   
 knee surgery  HX PACEMAKER    
 IR INSERT TUNL CVC W PORT OVER 5 YEARS    
 LAMINECTOMY,CERVICAL  1994  
 NEEDLE BIOPSY LIVER,W OTHR [address]  8.21.07  
 MA [address] AND 1994  [place] TRABECULOPLASTY BY LASER SURGERY    
 US GUIDE ASP OVARIAN CYST ABD APPROACH  8.21.07  
 RIGHT Family History Problem Relation Age of Onset  Cancer Mother  Heart Disease Mother  Alcohol abuse Father  Diabetes Brother  Hypertension Brother Social History Tobacco Use  Smoking status: Never Smoker  Smokeless tobacco: Never Used Substance Use Topics  Alcohol use: Yes Comment: rare Allergies Allergen Reactions  Ace Inhibitors Cough  Amlodipine Swelling  Avapro [Irbesartan] Unable to Obtain  Bactrim [Sulfamethoxazole-Trimethoprim] Other (comments) Sore mouth  Captopril Cough  Carvedilol Diarrhea [address]  Morphine Nausea and Vomiting and Swelling  Penicillins Hives Tolerated cefepime 1/2020  Pravachol [Pravastatin] Nausea Only  Seafood [Shellfish Containing Products] Hives  Singulair [Montelukast] Other (comments)  
  palpitations Prior to Admission medications Medication Sig Start Date End Date Taking? Authorizing Provider  
warfarin (COUMADIN) 4 mg tablet Take 2 mg by mouth five (5) days a week. Take 1 tablet (4 mg) on Sun and Wed and a half tablet (2 mg) the other 5 days. Yes Provider, Historical  
warfarin (COUMADIN) 4 mg tablet Take 4 mg by mouth two (2) times a week. Take 1 tablet (4 mg) on Sun and Wed and a half tablet (2 mg) the other 5 days. Yes Provider, Historical  
acetaminophen 500 mg tab 500 mg, diphenhydrAMINE 25 mg cap 25 mg Take 2 Doses by mouth nightly. Provider, Historical  
potassium chloride SR (KLOR-CON 10) 10 mEq tablet Take 10 mEq by mouth daily. Indications: Patient states she intends to take this medication untl she discusses with Dr. Austyn Seals on 12-12-19. Provider, Historical  
insulin glargine (LANTUS U-100 INSULIN) 100 unit/mL injection Inject 20 units daily 11/10/19   Isis Early MD  
hydrALAZINE (APRESOLINE) 100 mg tablet TAKE ONE TABLET BY MOUTH THREE TIMES A DAY 10/21/19   Isis Early MD  
sertraline (ZOLOFT) 50 mg tablet TAKE ONE AND ONE-HALF (1 & 1/2) TABLET BY MOUTH DAILY 8/22/19   Isis Early MD  
bumetanide (BUMEX) 1 mg tablet Take 2 mg by mouth daily. Provider, Historical  
cloNIDine HCl (CATAPRES) 0.3 mg tablet Take 0.6 mg by mouth nightly.     Provider, Historical  
metoprolol succinate (TOPROL-XL) 100 mg tablet TAKE TWO TABLETS BY MOUTH DAILY 19   Crow Plascencia MD  
doxazosin (CARDURA) 4 mg tablet TAKE ONE TABLET BY MOUTH ONCE NIGHTLY 19   Crow Plascencia MD  
busPIRone (BUSPAR) 10 mg tablet TAKE ONE TABLET BY MOUTH TWICE A DAY 19   Crow Plascencia MD  
metolazone (ZAROXOLYN) 5 mg tablet Take 1 Tab by mouth daily as needed (take if develop increased LE edema, weight gain > 5 pounds. ). 4/10/14   Sean Loya NP  
cholecalciferol, VITAMIN D3, (VITAMIN D3) 5,000 unit tab tablet Take 5,000 Units by mouth daily. Provider, Historical  
vitamin A 8,000 unit capsule Take 8,000 Units by mouth daily. Provider, Historical  
insulin lispro (HUMALOG) 100 unit/mL kwikpen 2 units with dinner for sugars over 200 1/3/14   Crow Plascencia MD  
cyanocobalamin (VITAMIN B-12) 1,000 mcg sublingual tablet Take 1,000 mcg by mouth daily. Provider, Historical  
 
 
Review of Systems AO x1. Unable to express what the surgical procedure would done today even after having it explained to her. Objective:  
 
Patient Vitals for the past 8 hrs: 
 BP Temp Pulse Resp SpO2 Weight 20 0745 162/65 97.9 °F (36.6 °C) 87 10 99 %   
20 0717 156/66 98.6 °F (37 °C) 85 16 98 %   
20 0236 141/61 98.7 °F (37.1 °C) 72 16 99 % 108 kg (238 lb) Temp (24hrs), Av.1 °F (36.7 °C), Min:96.7 °F (35.9 °C), Max:98.7 °F (37.1 °C) Physical Exam Lower Extremity Dressings of feet clean, dry, intact. Greatly reduced erythema and edema left foot. CFT less than 3 seconds all toes Sensation intact to light touch. Data Review:  
Recent Results (from the past 24 hour(s)) GLUCOSE, POC Collection Time: 20 11:33 AM  
Result Value Ref Range Glucose (POC) 85 65 - 100 mg/dL Performed by Henrietta Muniz (PCT) GLUCOSE, POC Collection Time: 20  4:18 PM  
Result Value Ref Range Glucose (POC) 113 (H) 65 - 100 mg/dL Performed by Henrietta Muniz (PCT) GLUCOSE, POC  Collection Time: 20  9:23 PM  
 Result Value Ref Range Glucose (POC) 87 65 - 100 mg/dL Performed by Moraima Garza (PCT) METABOLIC PANEL, BASIC Collection Time: 03/14/20  2:22 AM  
Result Value Ref Range Sodium 138 136 - 145 mmol/L Potassium 3.8 3.5 - 5.1 mmol/L Chloride 109 (H) 97 - 108 mmol/L  
 CO2 23 21 - 32 mmol/L Anion gap 6 5 - 15 mmol/L Glucose 97 65 - 100 mg/dL BUN 24 (H) 6 - 20 MG/DL Creatinine 1.95 (H) 0.55 - 1.02 MG/DL  
 BUN/Creatinine ratio 12 12 - 20 GFR est AA 32 (L) >60 ml/min/1.73m2 GFR est non-AA 26 (L) >60 ml/min/1.73m2 Calcium 9.1 8.5 - 10.1 MG/DL PROTHROMBIN TIME + INR Collection Time: 03/14/20  2:22 AM  
Result Value Ref Range INR 1.8 (H) 0.9 - 1.1 Prothrombin time 17.9 (H) 9.0 - 11.1 sec Impression: 1. Altered Mental Status 2. Ulcer to bone left foot 3. Cellulitis left lower extremity, improved Recommendation:  
I'm concerned about having her put under anesthesia with this sudden AMS. The grafting can be delayed without major risk to the recovery of the foot. I asked the RNs to have the Hospitalist evaluate the patient again, and I will F/U with her Monday.

## 2020-03-14 NOTE — PROGRESS NOTES
Problem: Falls - Risk of 
Goal: *Absence of Falls Description: Document Albert Click Fall Risk and appropriate interventions in the flowsheet. Outcome: Progressing Towards Goal 
Note: Fall Risk Interventions: 
Mobility Interventions: Bed/chair exit alarm, Patient to call before getting OOB Medication Interventions: Bed/chair exit alarm, Teach patient to arise slowly Elimination Interventions: Bed/chair exit alarm, Call light in reach, Patient to call for help with toileting needs History of Falls Interventions: Bed/chair exit alarm, Door open when patient unattended Problem: Pressure Injury - Risk of 
Goal: *Prevention of pressure injury Description: Document Valdemar Scale and appropriate interventions in the flowsheet. Outcome: Progressing Towards Goal 
Note: Pressure Injury Interventions: 
Sensory Interventions: Assess changes in LOC, Float heels, Keep linens dry and wrinkle-free, Maintain/enhance activity level, Minimize linen layers Moisture Interventions: Absorbent underpads, Apply protective barrier, creams and emollients, Minimize layers Activity Interventions: Increase time out of bed Mobility Interventions: Float heels, HOB 30 degrees or less Nutrition Interventions: Document food/fluid/supplement intake Friction and Shear Interventions: Lift team/patient mobility team, Minimize layers Problem: Infection - Risk of, Surgical Site Infection Goal: *Absence of surgical site infection signs and symptoms Outcome: Progressing Towards Goal

## 2020-03-14 NOTE — ANESTHESIA PREPROCEDURE EVALUATION
Relevant Problems No relevant active problems Anesthetic History PONV Review of Systems / Medical History Patient summary reviewed, nursing notes reviewed and pertinent labs reviewed Pulmonary Asthma Neuro/Psych Within defined limits Cardiovascular Hypertension CHF Dysrhythmias : atrial fibrillation Pacemaker (PM-sss) and hyperlipidemia Exercise tolerance: >4 METS Comments: Sick Sinus Syndrome 
 
TTE (3/9/20): ·Normal cavity size and systolic function (ejection fraction normal). Moderate concentric hypertrophy. Estimated left ventricular ejection fraction is 55 - 60%. ·Dilated left atrium. ·Dilated right ventricle. Mildly reduced systolic function. ·Mitral valve thickening. Moderate mitral valve regurgitation is present. ·Severely elevated central venous pressure (15+ mmHg); IVC diameter is larger than 21 mm and collapses less than 50% with respiration. GI/Hepatic/Renal 
  
 
 
Renal disease: CRI Comments: S/P Gastric bypass CRI, Stage IV Endo/Other Diabetes: well controlled, type 2, using insulin Obesity, arthritis and cancer (breast) Pertinent negatives: No morbid obesity Other Findings Comments: Left foot osteomyelitis Right arm lymphedema Neck and back surgery Physical Exam 
 
Airway Mallampati: II 
TM Distance: 4 - 6 cm Neck ROM: normal range of motion Mouth opening: Normal 
 
 Cardiovascular Regular rate and rhythm,  S1 and S2 normal,  no murmur, click, rub, or gallop Dental 
 
Dentition: Poor dentition, Upper partial plate and Lower partial plate Pulmonary Breath sounds clear to auscultation Abdominal 
GI exam deferred Other Findings Anesthetic Plan ASA: 3 Anesthesia type: MAC and total IV anesthesia Induction: Intravenous Anesthetic plan and risks discussed with: Patient

## 2020-03-14 NOTE — PROGRESS NOTES
Hospitalist Progress Note NAME: Tasha Villegas :  1960 MRN:  [id number] Assessment / Plan: 
Acute Encephalopathy not POA- noted today in preop holding 3/14 Suspect metabolic due to hypoglycemia versus TIA versus Psychiatric event Stat CT head = Moderate small vessel ischemic changes. Chronic sinus disease. No 
acute intracranial abnormality Examination non focal 
FS 86--> 98 , s/p 1/2 amp D50 in preop holding-- some improvement in mental status but pt still confused- oriented x 1 (person) IP neurology consult for further opinion Will hold off Lantus for now & will allow to eat diet so her FS remain up in the mean time. Will add bridging lovenox Q 12 for now till INR therapeutic (currently 1.8) Left leg cellulitis and ulceration of the left foot 
- Continue with empiric Vancomycin and Cefepime  For now - Blood cultures with no growth to date - CT leg noted - Venous US LLE negative for DVT Wound Cx= growing MSSA & sensitive Proteus 
 
- Pain control prn - Elevated limb as needed  
- POD#4 Debridement left foot wound and bone biopsy . Seen by Dr Tamiko Person yesterday- was planned for Graft surgery today but pt had new AMS in preop holding-- procedure cancelled for now NWB on L foot noted 
?need for wound vacc indicated Follow up with Dr Tamiko Person for further plans on Monday now once cleared by Neurology 
  
  
DM- was hypoglycemic due to poor PO intake noted- FS still running on low end in past 24 hrs Had decreased Lantus to half at 10 units daily 3/13-- will keep on hold for now Holding scheduled Novolog 5 units with meals for now Cont SSI lispro as needed 
  
Atrial fibrillation/RVR 
- s/p Cardizem bolus and remains on Cardizem gtt with good rate control -- now transitioning to oral diltiazem 
- Continue metoprolol 
- Continue coumadin; pharmacy consult for dosing, added bridging lovenox today with  ? TIA event in Preop holding today - supra therapeutic resolved  INR 1.8 today - Cardiology consult reviewed and appreciated  
 
Elevated troponin - Likely due to demand with rapid atrial fibrillation - Downtrending  
  
CKD4- Cr stable at ~ 1.9 
- Appears to be stable at baseline creatinine - Renally dose medications  
- sodium bicarbonate 650 mg bid - BUMEX 1 mg BID per nephrology - Give IV iron - Epogen - Nephrology on board. Greatly appreciate input  
  
HTN 
- Continue home medications: hydralazine,Toprol, Cardura , cardizem  
- hold clonidine and  Metolazone HLD 
  
Anxiety  
- Continue Zoloft 
  
CHF 
- No exacerbation - On  Bumex  
  
SSS 
- Pacemaker Morbid obesity due to BMI >35 with DM, HTN / Body mass index is 35.15 kg/m². Code status: Full Prophylaxis: anticoagulated with warfarin Recommended Disposition: Home w/Family versus HH/SNF TBD post operatively on monday Subjective: Chief Complaint / Reason for Physician Visit: follow up rapid atrial fibrillation and cellulitis, foot ulcers, AMS at preop holding today noted \"I am ok\" Review of Systems: 
Symptom Y/N Comments  Symptom Y/N Comments Fever/Chills    Chest Pain n   
Poor Appetite    Edema y Cough    Abdominal Pain n   
Sputum    Joint Pain SOB/CHOW    Pruritis/Rash Nausea/vomit    Tolerating PT/OT Diarrhea    Tolerating Diet Constipation    Other Could NOT obtain due to: AMS Objective: VITALS:  
Last 24hrs VS reviewed since prior progress note. Most recent are: 
Patient Vitals for the past 24 hrs: 
 Temp Pulse Resp BP SpO2  
03/14/20 0928 97.6 °F (36.4 °C) 91 15 184/82 99 % 03/14/20 0745 97.9 °F (36.6 °C) 87 10 162/65 99 % 03/14/20 0717 98.6 °F (37 °C) 85 16 156/66 98 % 03/14/20 0236 98.7 °F (37.1 °C) 72 16 141/61 99 % 03/13/20 2222 98.6 °F (37 °C) 82 16 121/44 98 % 03/13/20 1912 98.2 °F (36.8 °C) 63 16 114/61 100 % 03/13/20 1511 96.7 °F (35.9 °C) 76 16 124/57 99 % 03/13/20 1132 98.1 °F (36.7 °C) 85 16 148/61 98 % Intake/Output Summary (Last 24 hours) at 3/14/2020 1021 Last data filed at 3/14/2020 4287 Gross per 24 hour Intake 890 ml Output 1400 ml Net -510 ml PHYSICAL EXAM: 
General: WD, WN. Alert, cooperative, no acute distress   
EENT:  EOMI. Anicteric sclerae. MMM Resp:  CTA bilaterally, no wheezing or rales. No accessory muscle use CV:  Irregularly irregular  rhythm, +LLE edema 1-2+ GI:  Soft, Non distended, Non tender.  +Bowel sounds Neurologic:  Alert and oriented X1 (new), normal speech, moving all extremities well, hand  equal, slow to answer noted Psych:   Good insight. Not anxious nor agitated Skin:  No jaundice. Erythema of distal left lower extremity and foot, with heat and tenderness. Reviewed most current lab test results and cultures  YES Reviewed most current radiology test results   YES Review and summation of old records today    NO Reviewed patient's current orders and MAR    YES 
PMH/ reviewed - no change compared to H&P 
________________________________________________________________________ Care Plan discussed with: 
  Comments Patient x Family RN x Care Manager Consultant Multidiciplinary team rounds were held today with , nursing, pharmacist and clinical coordinator. Patient's plan of care was discussed; medications were reviewed and discharge planning was addressed. ________________________________________________________________________ Total NON critical care TIME:  26   Minutes Total CRITICAL CARE TIME Spent:   Minutes non procedure based Comments >50% of visit spent in counseling and coordination of care    
________________________________________________________________________ Mathew Willis MD  
 
Procedures: see electronic medical records for all procedures/Xrays and details which were not copied into this note but were reviewed prior to creation of Plan. LABS: 
I reviewed today's most current labs and imaging studies. Pertinent labs include: 
Recent Labs  
  03/13/20 0410 WBC 13.4* HGB 9.3* HCT 30.2* * Recent Labs  
  03/14/20 
0222 03/13/20 0410 03/12/20 
0354  137 137  
K 3.8 3.8 4.0  
* 109* 111* CO2 23 23 21 GLU 97 109* 85 BUN 24* 24* 24* CREA 1.95* 1.86* 1.88* CA 9.1 8.7 8.9 INR 1.8* 1.5* 1.3* Signed: Vee Edwards MD

## 2020-03-14 NOTE — PROGRESS NOTES
1900:Bedside shift change report given to Xenia Hernándezk (oncoming nurse) by Jania Diop (offgoing nurse). Report included the following information SBAR and Kardex. 0700: 
Bedside shift change report GIVEN TO , RN. Report included the following information SBAR and Kardex. SIGNIFICANT CHANGES DURING SHIFT:   
 
 
CONCERNS TO ADDRESS WITH MD:   
 
 
 
 
[address] NURSING NOTE Admission Date 3/8/2020 Admission Diagnosis Atrial fibrillation with RVR ([address] [I48.91] Left leg cellulitis [N36.083] Consults IP CONSULT TO CARDIOLOGY 
IP CONSULT TO PODIATRY IP CONSULT TO NEPHROLOGY 
IP CONSULT TO HOSPITALIST Cardiac Monitoring [x] Yes [] No  
  
Purposeful Hourly Rounding [x] Yes   
Rayne Score Total Score: 2 Rayne score 3 or > [x] Bed Alarm [] Avasys [] 1:1 sitter [] Patient refused (Signed refusal form in chart) Valdemar Score Valdemar Score: 15 Valdemar score 14 or < [] PMT consult [] Wound Care consult  
 []  Specialty bed  [] Nutrition consult Influenza Vaccine Received Flu Vaccine for Current Season (usually Sept-March): Yes Oxygen needs? [x] Room air Oxygen @  []1L    []2L    []3L   []4L    []5L   []6L via NC Chronic home O2 use? [] Yes [] No 
Perform O2 challenge test and document in progress note using smartphrase (.Homeoxygen) Last bowel movement Last Bowel Movement Date: 03/12/20 Urinary Catheter External Female Catheter 03/10/20-Urine Output (mL): 500 ml LDAs Peripheral IV 03/10/20 Left Forearm (Active) Site Assessment Clean, dry, & intact 3/13/2020  7:24 PM  
Phlebitis Assessment 0 3/13/2020  7:24 PM  
Infiltration Assessment 0 3/13/2020  7:24 PM  
Dressing Status Clean, dry, & intact 3/13/2020  7:24 PM  
Dressing Type Tape;Transparent 3/13/2020  7:24 PM  
Hub Color/Line Status Pink;Flushed 3/13/2020  7:24 PM  
Action Taken Other (comment) 3/10/2020 10:17 AM  
      
External Female Catheter 03/10/20 (Active) Site Assessment Clean, dry, & intact 3/11/2020  9:30 PM  
Repositioned Yes 3/11/2020  9:30 PM  
Perineal Care Yes 3/11/2020  9:30 PM  
Wick Changed Yes 3/11/2020  9:30 PM  
Suction Canister/Tubing Changed No 3/11/2020  9:30 PM  
Urine Output (mL) 500 ml 3/13/2020  5:21 PM  
            
  
Readmission Risk Assessment Tool Score High Risk 35 Total Score 3 Has Seen PCP in Last 6 Months (Yes=3, No=0) 3 Patient Length of Stay (>5 days = 3) 4 IP Visits Last 12 Months (1-3=4, 4=9, >4=11) 23 Charlson Comorbidity Score (Age + Comorbid Conditions) Criteria that do not apply:  
 . Living with Significant Other. Assisted Living. LTAC. SNF. or  
Rehab Pt. Coverage (Medicare=5 , Medicaid, or Self-Pay=4) Expected Length of Stay 3d 7h Actual Length of Stay 5

## 2020-03-14 NOTE — PROGRESS NOTES
Pharmacy Automatic Renal Dosing Protocol - Antimicrobials Indication for Antimicrobials: skin and soft tissue infection - L leg, h/o chronic foot ulcers Current Regimen of Each Antimicrobial:  
Vancomycin 1000 mg IV every 36 hours (Start Date 3/8; day ) Cefepime 2 g IV every 12 hours (Start Date 3/8; day ) Previous Antimicrobial Therapy: N/a 
 
Goal Level: VANCOMYCIN TROUGH GOAL RANGE Vancomycin Trough: 15 - 20 mcg/mL  (AUC: 400 - 600 mg/hr/Liter/day) Date Dose & Interval Measured (mcg/mL) Extrapolated (mcg/mL) 3/10 1250 mg Q24H 19.6 22.2  
3/12 @ 1312 1 g every 24 hours 25 25.5 Date & time of next level: no additional at this time Significant Cultures:  
3/8 blood: NGTD x 5 days- final 
3/10 Wound: heavy group B strep, moderate group G strep, light MSSA, and scant proteus- final 
3/10 Anaerobic: checking for poss anaerobes- pending Radiology / Imaging results: (X-ray, CT scan or MRI):  
3/8 L foot XR: No signal change. Plantar soft tissue ulcers. No apparent osteomyelitis. 3/8 LLE CT (prelim) Unenhanced CT Left Foot show no bone erosion, periostitis or other radiographic features of overt osteomyelitis. There is plantar soft tissue emphysema. Paralysis, amputations, malnutrition: none noted Labs: 
Recent Labs  
  20 
0222 20 
0410 20 
0354 CREA 1.95* 1.86* 1.88* BUN 24* 24* 24* WBC  --  13.4*  -- Temp (24hrs), Av °F (36.7 °C), Min:96.7 °F (35.9 °C), Max:98.7 °F (37.1 °C) Creatinine Clearance (mL/min) or Dialysis: 34 mL/min (IBW) Impression/Plan:  
Will continue current regimen as appropriate for indication and renal function. Antimicrobial stop date 7 days (may be extended based on podiatry report of margins). Pharmacy will follow daily and adjust medications as appropriate for renal function and/or serum levels. Thank you, NOVA Abreu

## 2020-03-14 NOTE — PERIOP NOTES
TRANSFER - IN REPORT: 
 
Verbal report received from Odalis Mclean RN(name) on Antonietta Sale  being received from 2225(unit) for ordered procedure Report consisted of patients Situation, Background, Assessment and  
Recommendations(SBAR). Information from the following report(s) SBAR, Kardex, ED Summary, OR Summary, Procedure Summary, Intake/Output, MAR, Accordion, Recent Results, Med Rec Status, Cardiac Rhythm A fib/A flutter, Alarm Parameters , Pre Procedure Checklist, Procedure Verification and Quality Measures was reviewed with the receiving nurse. Opportunity for questions and clarification was provided. Assessment completed upon patients arrival to unit and care assumed.

## 2020-03-14 NOTE — PROGRESS NOTES
1900:Bedside shift change report given to Burak Graham (oncoming nurse) by 612 Martha Ng (offgoing nurse). Report included the following information SBAR and Kardex. 0700: 
Bedside shift change report GIVEN TO Mike Gallardo RN. Report included the following information SBAR and Kardex. SIGNIFICANT CHANGES DURING SHIFT:   
 
 
CONCERNS TO ADDRESS WITH MD:  
 
 
 
[address] NURSING NOTE Admission Date 3/8/2020 Admission Diagnosis Atrial fibrillation with RVR ([address] [I48.91] Left leg cellulitis [F42.512] Consults IP CONSULT TO CARDIOLOGY 
IP CONSULT TO PODIATRY IP CONSULT TO NEPHROLOGY 
IP CONSULT TO HOSPITALIST Cardiac Monitoring [x] Yes [] No  
  
Purposeful Hourly Rounding [x] Yes   
Rayne Score Total Score: 2 Rayne score 3 or > [x] Bed Alarm [] Avasys [] 1:1 sitter [] Patient refused (Signed refusal form in chart) Valdemar Score Valdemar Score: 15 Valdemar score 14 or < [] PMT consult [] Wound Care consult  
 []  Specialty bed  [] Nutrition consult Influenza Vaccine Received Flu Vaccine for Current Season (usually Sept-March): Yes Oxygen needs? [x] Room air Oxygen @  []1L    []2L    []3L   []4L    []5L   []6L via NC Chronic home O2 use? [] Yes [x] No 
Perform O2 challenge test and document in progress note using smartCompany [employer] (.Homeoxygen) Last bowel movement Last Bowel Movement Date: 03/12/20 Urinary Catheter External Female Catheter 03/10/20-Urine Output (mL): 400 ml LDAs Peripheral IV 03/10/20 Left Forearm (Active) Site Assessment Clean, dry, & intact 3/14/2020  3:26 AM  
Phlebitis Assessment 0 3/14/2020  3:26 AM  
Infiltration Assessment 0 3/14/2020  3:26 AM  
Dressing Status Clean, dry, & intact 3/14/2020  3:26 AM  
Dressing Type Tape;Transparent 3/14/2020  3:26 AM  
Hub Color/Line Status Pink;Flushed 3/14/2020  3:26 AM  
Action Taken Other (comment) 3/10/2020 10:17 AM  
      
External Female Catheter 03/10/20 (Active) Site Assessment Clean, dry, & intact 3/13/2020 10:22 PM  
Repositioned Yes 3/13/2020 10:22 PM  
Perineal Care Yes 3/13/2020 10:22 PM  
Wick Changed Yes 3/13/2020 10:22 PM  
Suction Canister/Tubing Changed No 3/13/2020 10:22 PM  
Urine Output (mL) 400 ml 3/13/2020 10:22 PM  
            
  
Readmission Risk Assessment Tool Score High Risk 35 Total Score 3 Has Seen PCP in Last 6 Months (Yes=3, No=0) 3 Patient Length of Stay (>5 days = 3) 4 IP Visits Last 12 Months (1-3=4, 4=9, >4=11) 23 Charlson Comorbidity Score (Age + Comorbid Conditions) Criteria that do not apply:  
 . Living with Significant Other. Assisted Living. LTAC. SNF. or  
Rehab Pt. Coverage (Medicare=5 , Medicaid, or Self-Pay=4) Expected Length of Stay 3d 7h Actual Length of Stay 6

## 2020-03-14 NOTE — PERIOP NOTES
Patient's phone and gray metal ring from left 3rd finger removed and placed in top  room. Patient's glasses to PreOP. No dentures in place. Per report, patient very flat, withdrawn, and uncooperative prior to transfer. Patient verbalizes short responses when questioned but then does not expand upon them nor does she physically complete the command she is asked to perform. Patient transferred to PACU for Pre-op. While in PACU patient unable to verbalize orientation questions. Dr. Carey Cardenas notified.

## 2020-03-14 NOTE — CONSULTS
NEUROLOGY NOTE Chief Complaint Patient presents with  Leg Swelling  
  leg pain, swelling, and redness x 24 hours; reports that prior to that she had been experiencing fever and chills; hx of DM and cellulitis, but to other leg Reason for Consult I have been asked by Danisha Kwan MD to see the patient in neurological consultation to render advice and opinion regarding alt mental status HPI The patient is a 68-year-old woman with initially presented on 3/8/2020 as she was having fever and chills and yesterday she saw that her left leg was swollen and red. She was also in A. fib with RVR with heart rate more than 150 she was found to have left leg cellulitis. She was started on vancomycin and cefepime. The patient did have infected diabetic wound. The patient does also have CKD 3 likely from diabetes nephropathy. Patient did have left foot debridement and bone biopsy from the left fifth metatarsal on 3/10/2020. The work-up showed no evidence of osteomyelitis the exposed bone is high risk for osteomyelitis and the skin substitute graft needs to be placed to cover the bone. On 2/4/2020, the patient was having altered mental status. The patient surgery was delayed. The patient's blood sugar was 86 which improved to 98 and there was some improvement in mental status. The patient is still confused and oriented x1. Patient's blood pressure was 184/82 at around 9 AM. Patient did have a CT scan of the head which showed moderate small vessel ischemic change. ROS A ten system review of constitutional, cardiovascular, respiratory, musculoskeletal, endocrine, skin, SHEENT, genitourinary, psychiatric and neurologic systems was obtained and is unremarkable except as stated in HPI  
 
PMH Past Medical History:  
Diagnosis Date  Acquired lymphedema Right arm  Arrhythmia  Asthma  Atrial fibrillation ([facility] [address]  Dr. Sarah Beth Clifton and Dr. Rosy Pappas  
  Atrial flutter ([facility]  Cancer ([facility] bilateral breast  
 Chronic kidney disease  Diabetes mellitus type II   
 Dizziness  Essential hypertension  Hyperlipidemia  Hypertension  Long term (current) use of anticoagulants  Morbid obesity ([facility] [address] 3/14/2012  Nausea & vomiting  Osteoarthritis  Pacemaker  Sick sinus syndrome ([facility] [address] [facility] Family History Problem Relation Age of Onset  Cancer Mother  Heart Disease Mother  Alcohol abuse Father  Diabetes Brother  Hypertension Brother [address] Social History Socioeconomic History  Marital status:  Spouse name: Not on file  Number of children: Not on file  Years of education: Not on file  Highest education level: Not on file Tobacco Use  Smoking status: Never Smoker  Smokeless tobacco: Never Used Substance and Sexual Activity  Alcohol use: Yes Comment: rare  Drug use: No  
 
 
ALLERGIES Allergies Allergen Reactions  Ace Inhibitors Cough  Amlodipine Swelling  Avapro [Irbesartan] Unable to Obtain  Bactrim [Sulfamethoxazole-Trimethoprim] Other (comments) Sore mouth  Captopril Cough  Carvedilol Diarrhea [address]  Morphine Nausea and Vomiting and Swelling  Penicillins Hives Tolerated cefepime 1/2020  Pravachol [Pravastatin] Nausea Only  Seafood [Shellfish Containing Products] Hives  Singulair [Montelukast] Other (comments)  
  palpitations PHYSICAL EXAMINATION:  
Patient Vitals for the past 24 hrs: 
 Temp Pulse Resp BP SpO2  
03/14/20 1119 97.4 °F (36.3 °C) 86 16 156/77 99 % 03/14/20 0928 97.6 °F (36.4 °C) 91 15 184/82 99 % 03/14/20 0745 97.9 °F (36.6 °C) 87 10 162/65 99 % 03/14/20 0717 98.6 °F (37 °C) 85 16 156/66 98 % 03/14/20 0236 98.7 °F (37.1 °C) 72 16 141/61 99 % 03/13/20 2222 98.6 °F (37 °C) 82 16 121/44 98 % 03/13/20 1912 98.2 °F (36.8 °C) 63 16 114/61 100 % 03/13/20 1511 96.7 °F (35.9 °C) 76 16 124/57 99 % General:  
General appearance: Pt is in no acute distress Distal pulses are preserved Fundoscopic exam: attempted Neurological Examination:  
Mental Status:  Drowsy O x2. Speech is slow . Follows commands, has normal fund of knowledge, attention, short term recall, comprehension and insight. Cranial Nerves: Visual fields are full. PERRL, Extraocular movements are full. Facial sensation intact. Facial movement intact. Hearing intact to conversation. Palate elevates symmetrically. Shoulder shrug symmetric. Tongue midline. Motor: Strength is 5/5 in all 4 ext. Normal tone. No atrophy. Sensation: Light touch - Normal 
 
Reflexes: DTRs 1+ in UE and absent in LE Coordination/Cerebellar: Intact to finger-nose-finger Gait: deferred Skin: No significant bruising or lacerations. LAB DATA REVIEWED:   
Recent Results (from the past 24 hour(s)) GLUCOSE, POC Collection Time: 03/13/20  4:18 PM  
Result Value Ref Range Glucose (POC) 113 (H) 65 - 100 mg/dL Performed by Isabel Chandler (PCT) GLUCOSE, POC Collection Time: 03/13/20  9:23 PM  
Result Value Ref Range Glucose (POC) 87 65 - 100 mg/dL Performed by Moraima Garza (PCT) METABOLIC PANEL, BASIC Collection Time: 03/14/20  2:22 AM  
Result Value Ref Range Sodium 138 136 - 145 mmol/L Potassium 3.8 3.5 - 5.1 mmol/L Chloride 109 (H) 97 - 108 mmol/L  
 CO2 23 21 - 32 mmol/L Anion gap 6 5 - 15 mmol/L Glucose 97 65 - 100 mg/dL BUN 24 (H) 6 - 20 MG/DL Creatinine 1.95 (H) 0.55 - 1.02 MG/DL  
 BUN/Creatinine ratio 12 12 - 20 GFR est AA 32 (L) >60 ml/min/1.73m2 GFR est non-AA 26 (L) >60 ml/min/1.73m2 Calcium 9.1 8.5 - 10.1 MG/DL PROTHROMBIN TIME + INR Collection Time: 03/14/20  2:22 AM  
Result Value Ref Range INR 1.8 (H) 0.9 - 1.1 Prothrombin time 17.9 (H) 9.0 - 11.1 sec GLUCOSE, POC  Collection Time: 03/14/20  7:48 AM  
 Result Value Ref Range Glucose (POC) 86 65 - 100 mg/dL Performed by Verenice Foley Tyler Rivera GLUCOSE, POC Collection Time: 20 10:03 AM  
Result Value Ref Range Glucose (POC) 98 65 - 100 mg/dL Performed by Dallas Lowery Imaging review: 
CT Head Normal 
 
HOME MEDS Prior to Admission Medications Prescriptions Last Dose Informant Patient Reported? Taking?  
acetaminophen 500 mg tab 500 mg, diphenhydrAMINE 25 mg cap 25 mg   Yes No  
Sig: Take 2 Doses by mouth nightly. bumetanide (BUMEX) 1 mg tablet  Self Yes No  
Sig: Take 2 mg by mouth daily. busPIRone (BUSPAR) 10 mg tablet  Self No No  
Sig: TAKE ONE TABLET BY MOUTH TWICE A DAY cholecalciferol, VITAMIN D3, (VITAMIN D3) 5,000 unit tab tablet  Self Yes No  
Sig: Take 5,000 Units by mouth daily. cloNIDine HCl (CATAPRES) 0.3 mg tablet  Self Yes No  
Sig: Take 0.6 mg by mouth nightly. cyanocobalamin (VITAMIN B-12) 1,000 mcg sublingual tablet  Self Yes No  
Sig: Take 1,000 mcg by mouth daily. doxazosin (CARDURA) 4 mg tablet  Self No No  
Sig: TAKE ONE TABLET BY MOUTH ONCE NIGHTLY  
hydrALAZINE (APRESOLINE) 100 mg tablet   No No  
Sig: TAKE ONE TABLET BY MOUTH THREE TIMES A DAY  
insulin glargine (LANTUS U-100 INSULIN) 100 unit/mL injection   No No  
Sig: Inject 20 units daily  
insulin lispro (HUMALOG) 100 unit/mL kwikpen  Self Yes No  
Si units with dinner for sugars over 200  
metolazone (ZAROXOLYN) 5 mg tablet  Self No No  
Sig: Take 1 Tab by mouth daily as needed (take if develop increased LE edema, weight gain > 5 pounds. ). metoprolol succinate (TOPROL-XL) 100 mg tablet  Self No No  
Sig: TAKE TWO TABLETS BY MOUTH DAILY potassium chloride SR (KLOR-CON 10) 10 mEq tablet   Yes No  
Sig: Take 10 mEq by mouth daily.  Indications: Patient states she intends to take this medication untl she discusses with Dr. Lavelle Jamison on 19.  
sertraline (ZOLOFT) 50 mg tablet   No No  
 Sig: TAKE ONE AND ONE-HALF (1 & 1/2) TABLET BY MOUTH DAILY  
vitamin A 8,000 unit capsule  Self Yes No  
Sig: Take 8,000 Units by mouth daily. warfarin (COUMADIN) 4 mg tablet 3/5/2020  Yes Yes Sig: Take 2 mg by mouth five (5) days a week. Take 1 tablet (4 mg) on Sun and Wed and a half tablet (2 mg) the other 5 days. warfarin (COUMADIN) 4 mg tablet 3/4/2020  Yes Yes Sig: Take 4 mg by mouth two (2) times a week. Take 1 tablet (4 mg) on Sun and Wed and a half tablet (2 mg) the other 5 days. Facility-Administered Medications: None CURRENT MEDS Current Facility-Administered Medications Medication Dose Route Frequency  alcohol 62% (NOZIN) nasal  1 Ampule  1 Ampule Topical Q12H  
 enoxaparin (LOVENOX) injection 100 mg  100 mg SubCUTAneous Q12H  warfarin (COUMADIN) tablet 3 mg  3 mg Oral ONCE  
 iron sucrose (VENOFER) injection 200 mg  200 mg IntraVENous DAILY  epoetin viet-epbx (RETACRIT) 12,000 Units combo injection  12,000 Units SubCUTAneous Q MON, WED & FRI  
 docusate sodium (COLACE) capsule 100 mg  100 mg Oral BID  bumetanide (BUMEX) injection 1 mg  1 mg IntraVENous BID  sodium hypochlorite (HALF STRENGTH DAKIN'S) 0.25% irrigation (bottle)   Topical DAILY  cefepime (MAXIPIME) 2 g in 0.9% sodium chloride (MBP/ADV) 100 mL  2 g IntraVENous Q12H  
 dilTIAZem (CARDIZEM) IR tablet 60 mg  60 mg Oral TIDAC  WARFARIN INFORMATION NOTE (COUMADIN)   Other QPM  
 sodium bicarbonate tablet 650 mg  650 mg Oral TID  busPIRone (BUSPAR) tablet 10 mg  10 mg Oral DAILY  cholecalciferol (VITAMIN D3) (1000 Units /25 mcg) tablet 5 Tab  5,000 Units Oral DAILY  cyanocobalamin (VITAMIN B12) tablet 1,000 mcg  1,000 mcg Oral DAILY  doxazosin (CARDURA) tablet 4 mg  4 mg Oral DAILY  hydrALAZINE (APRESOLINE) tablet 100 mg  100 mg Oral Q8H  
 metoprolol succinate (TOPROL-XL) XL tablet 100 mg  100 mg Oral DAILY  potassium chloride SR (KLOR-CON 10) tablet 10 mEq  10 mEq Oral DAILY  sertraline (ZOLOFT) tablet 50 mg  50 mg Oral DAILY  sodium chloride (NS) flush 5-40 mL  5-40 mL IntraVENous Q8H  
 insulin lispro (HUMALOG) injection   SubCUTAneous AC&HS IMPRESSION/RECOMMENDATIONS: 
Juanita Vazquez is a 60 y.o. female who presents with A fib and cellulitis. Negative for osteomyelitis. Pt was supposed to get a skin graft but did have alt mental status. BS is in normal range. Non focal Exam. Possibility of stroke/tia is low but the pt does have A fib and will assess. Will get MRI brain and EEG. There is also a possibility of encephalopathy secondary to cefepime specially it does not seem to be renally adjusted. - MRI brain 
- EEG 
- Blood work - ammonia, B12 and folate - Discussed with the hospitalist team to assess the need to renally adjust the dosage of cefepime. Will reassess tomorrow. Thank you very much for this consultation.   
 
Sabrina Marinelli MD 
Neurologist

## 2020-03-14 NOTE — PERIOP NOTES
Difficulty with determining orientation. She is unable to verbalize orientation questions. Just says \"Yeah\"  She has a flat affect and poor or delayed command following. She signed consent yesterday. Multiple attempts made to contact  with the number provided in demographics ([phone]), but unsuccessful. Notified Dr. Clark Colon of current orientation. At bedside to assess and MD noted change in affect. Requested to notify Attending for follow-up. Cancelled surgery. [address] and notified Dr. Ariel Schmitt of the above mentioned. He is reviewing her chart. Discussed BG 86. \"Give 1/2 amp D50 now and I will enter an order for a CT now. \"  
 
0820 Neuro assessment completed, face symmetrical,  equal, no drift noted. No slurred speech noted. Unable to get her to follow or track with eyes, Questionable compliance vs. confusion? ?  
 
0830 Discussed above mentioned with primary RN, Ewa Trinh. We will take patient to CT and he will resume care from there.

## 2020-03-14 NOTE — PROGRESS NOTES
Labs reviewed Pt stable from renal stand point We will check back again on Monday. Please call the oncall physician over the weekend for any urgent renal issues Augusto Crabtree MD 
[facility] Office :[phone] Fax: [phone]

## 2020-03-14 NOTE — PROGRESS NOTES
Pharmacy Dosing of Warfarin Indication: A. fib Goal INR: 2-3 PTA Warfarin Dose: 4 mg on Sun, Wed and 2 mg on all other days Concurrent anticoagulants: none Concurrent antiplatelet: none Major Interacting Medications ? Drugs that may increase INR: cefepime ? Drugs that may decrease INR: Phytonadione 2.5 mg on 3/9 Conditions that may increase/decrease INR (CHF, C. diff, cirrhosis, thyroid disorder, hypoalbuminemia): None Labs: 
Recent Labs  
  03/14/20 
0222 03/13/20 
0410 03/12/20 
0354 INR 1.8* 1.5* 1.3* HGB  --  9.3*  --   
PLT  --  551*  --   
 
Estimated Creatinine Clearance: 40.2 mL/min (A) (based on SCr of 1.95 mg/dL (H)). Impression/Plan: ? INR remains subtherapeutic. Warfarin 3 mg for tonight ? Lovenox 1 mg subQ Q12H bridge started. Will round to 100 mg subQ Q12H for dosage form availability and renal function. ? INR daily, CBC w/o diff every other day Thank you, Janett Sparks, PharmD, BCACP, CDE  
 
[email or web address]/[email or web address]/[email or web address]/Warfarin%[email or web address]. pdf

## 2020-03-15 LAB
ANION GAP SERPL CALC-SCNC: 7 MMOL/L (ref 5–15)
BASOPHILS # BLD: 0.1 K/UL (ref 0–0.1)
BASOPHILS NFR BLD: 1 % (ref 0–1)
BUN SERPL-MCNC: 28 MG/DL (ref 6–20)
BUN/CREAT SERPL: 14 (ref 12–20)
CALCIUM SERPL-MCNC: 8.5 MG/DL (ref 8.5–10.1)
CHLORIDE SERPL-SCNC: 108 MMOL/L (ref 97–108)
CO2 SERPL-SCNC: 24 MMOL/L (ref 21–32)
CREAT SERPL-MCNC: 2.06 MG/DL (ref 0.55–1.02)
DIFFERENTIAL METHOD BLD: ABNORMAL
EOSINOPHIL # BLD: 0.1 K/UL (ref 0–0.4)
EOSINOPHIL NFR BLD: 1 % (ref 0–7)
ERYTHROCYTE [DISTWIDTH] IN BLOOD BY AUTOMATED COUNT: 16 % (ref 11.5–14.5)
GLUCOSE BLD STRIP.AUTO-MCNC: 139 MG/DL (ref 65–100)
GLUCOSE BLD STRIP.AUTO-MCNC: 145 MG/DL (ref 65–100)
GLUCOSE BLD STRIP.AUTO-MCNC: 149 MG/DL (ref 65–100)
GLUCOSE BLD STRIP.AUTO-MCNC: 159 MG/DL (ref 65–100)
GLUCOSE BLD STRIP.AUTO-MCNC: 172 MG/DL (ref 65–100)
GLUCOSE SERPL-MCNC: 127 MG/DL (ref 65–100)
HCT VFR BLD AUTO: 31.6 % (ref 35–47)
HGB BLD-MCNC: 9.6 G/DL (ref 11.5–16)
IMM GRANULOCYTES # BLD AUTO: 0.2 K/UL (ref 0–0.04)
IMM GRANULOCYTES NFR BLD AUTO: 2 % (ref 0–0.5)
INR PPP: 2.5 (ref 0.9–1.1)
LYMPHOCYTES # BLD: 0.8 K/UL (ref 0.8–3.5)
LYMPHOCYTES NFR BLD: 8 % (ref 12–49)
MCH RBC QN AUTO: 28.7 PG (ref 26–34)
MCHC RBC AUTO-ENTMCNC: 30.4 G/DL (ref 30–36.5)
MCV RBC AUTO: 94.3 FL (ref 80–99)
MONOCYTES # BLD: 1.1 K/UL (ref 0–1)
MONOCYTES NFR BLD: 10 % (ref 5–13)
NEUTS SEG # BLD: 8.3 K/UL (ref 1.8–8)
NEUTS SEG NFR BLD: 78 % (ref 32–75)
NRBC # BLD: 0 K/UL (ref 0–0.01)
NRBC BLD-RTO: 0 PER 100 WBC
PLATELET # BLD AUTO: 538 K/UL (ref 150–400)
PMV BLD AUTO: 10.2 FL (ref 8.9–12.9)
POTASSIUM SERPL-SCNC: 4.6 MMOL/L (ref 3.5–5.1)
PROTHROMBIN TIME: 24.5 SEC (ref 9–11.1)
RBC # BLD AUTO: 3.35 M/UL (ref 3.8–5.2)
RBC MORPH BLD: ABNORMAL
SERVICE CMNT-IMP: ABNORMAL
SODIUM SERPL-SCNC: 139 MMOL/L (ref 136–145)
WBC # BLD AUTO: 10.6 K/UL (ref 3.6–11)

## 2020-03-15 PROCEDURE — 36415 COLL VENOUS BLD VENIPUNCTURE: CPT

## 2020-03-15 PROCEDURE — 74011000250 HC RX REV CODE- 250: Performed by: PODIATRIST

## 2020-03-15 PROCEDURE — 80048 BASIC METABOLIC PNL TOTAL CA: CPT

## 2020-03-15 PROCEDURE — 65660000000 HC RM CCU STEPDOWN

## 2020-03-15 PROCEDURE — 82962 GLUCOSE BLOOD TEST: CPT

## 2020-03-15 PROCEDURE — 74011250637 HC RX REV CODE- 250/637: Performed by: PODIATRIST

## 2020-03-15 PROCEDURE — 85610 PROTHROMBIN TIME: CPT

## 2020-03-15 PROCEDURE — 85025 COMPLETE CBC W/AUTO DIFF WBC: CPT

## 2020-03-15 PROCEDURE — 74011000258 HC RX REV CODE- 258: Performed by: PODIATRIST

## 2020-03-15 PROCEDURE — 74011250636 HC RX REV CODE- 250/636: Performed by: PODIATRIST

## 2020-03-15 PROCEDURE — 74011250637 HC RX REV CODE- 250/637: Performed by: FAMILY MEDICINE

## 2020-03-15 PROCEDURE — 74011250636 HC RX REV CODE- 250/636: Performed by: INTERNAL MEDICINE

## 2020-03-15 PROCEDURE — 74011250637 HC RX REV CODE- 250/637: Performed by: INTERNAL MEDICINE

## 2020-03-15 PROCEDURE — 74011636637 HC RX REV CODE- 636/637: Performed by: PODIATRIST

## 2020-03-15 RX ORDER — WARFARIN 2 MG/1
2 TABLET ORAL ONCE
Status: COMPLETED | OUTPATIENT
Start: 2020-03-15 | End: 2020-03-15

## 2020-03-15 RX ORDER — METRONIDAZOLE 500 MG/100ML
500 INJECTION, SOLUTION INTRAVENOUS EVERY 8 HOURS
Status: DISCONTINUED | OUTPATIENT
Start: 2020-03-15 | End: 2020-03-15

## 2020-03-15 RX ORDER — METRONIDAZOLE 500 MG/100ML
500 INJECTION, SOLUTION INTRAVENOUS EVERY 12 HOURS
Status: COMPLETED | OUTPATIENT
Start: 2020-03-15 | End: 2020-03-20

## 2020-03-15 RX ORDER — LEVOFLOXACIN 5 MG/ML
750 INJECTION, SOLUTION INTRAVENOUS
Status: DISCONTINUED | OUTPATIENT
Start: 2020-03-15 | End: 2020-03-19 | Stop reason: SDUPTHER

## 2020-03-15 RX ADMIN — SODIUM BICARBONATE 650 MG: 650 TABLET ORAL at 10:01

## 2020-03-15 RX ADMIN — METRONIDAZOLE 500 MG: 500 INJECTION, SOLUTION INTRAVENOUS at 10:16

## 2020-03-15 RX ADMIN — Medication 10 ML: at 22:00

## 2020-03-15 RX ADMIN — SODIUM BICARBONATE 650 MG: 650 TABLET ORAL at 17:27

## 2020-03-15 RX ADMIN — BUSPIRONE HYDROCHLORIDE 10 MG: 10 TABLET ORAL at 10:01

## 2020-03-15 RX ADMIN — HYDROMORPHONE HYDROCHLORIDE 0.5 MG: 1 INJECTION, SOLUTION INTRAMUSCULAR; INTRAVENOUS; SUBCUTANEOUS at 00:54

## 2020-03-15 RX ADMIN — BUMETANIDE 1 MG: 0.25 INJECTION INTRAMUSCULAR; INTRAVENOUS at 09:19

## 2020-03-15 RX ADMIN — CEFEPIME HYDROCHLORIDE 2 G: 2 INJECTION, POWDER, FOR SOLUTION INTRAVENOUS at 00:31

## 2020-03-15 RX ADMIN — ENOXAPARIN SODIUM 100 MG: 100 INJECTION SUBCUTANEOUS at 00:31

## 2020-03-15 RX ADMIN — METOPROLOL SUCCINATE 100 MG: 50 TABLET, EXTENDED RELEASE ORAL at 09:23

## 2020-03-15 RX ADMIN — WARFARIN SODIUM 2 MG: 2 TABLET ORAL at 17:27

## 2020-03-15 RX ADMIN — Medication 1 AMPULE: at 22:18

## 2020-03-15 RX ADMIN — HYOSCYAMINE SULFATE: 16 SOLUTION at 10:04

## 2020-03-15 RX ADMIN — BUMETANIDE 1 MG: 0.25 INJECTION INTRAMUSCULAR; INTRAVENOUS at 17:26

## 2020-03-15 RX ADMIN — HYDROMORPHONE HYDROCHLORIDE 0.5 MG: 1 INJECTION, SOLUTION INTRAMUSCULAR; INTRAVENOUS; SUBCUTANEOUS at 13:32

## 2020-03-15 RX ADMIN — METRONIDAZOLE 500 MG: 500 INJECTION, SOLUTION INTRAVENOUS at 21:20

## 2020-03-15 RX ADMIN — HYDRALAZINE HYDROCHLORIDE 100 MG: 50 TABLET, FILM COATED ORAL at 21:18

## 2020-03-15 RX ADMIN — Medication 1 AMPULE: at 10:01

## 2020-03-15 RX ADMIN — DOCUSATE SODIUM 100 MG: 100 CAPSULE, LIQUID FILLED ORAL at 17:27

## 2020-03-15 RX ADMIN — DILTIAZEM HYDROCHLORIDE 60 MG: 60 TABLET, FILM COATED ORAL at 09:17

## 2020-03-15 RX ADMIN — Medication 10 ML: at 13:32

## 2020-03-15 RX ADMIN — SERTRALINE HYDROCHLORIDE 50 MG: 50 TABLET ORAL at 10:01

## 2020-03-15 RX ADMIN — HYDRALAZINE HYDROCHLORIDE 100 MG: 50 TABLET, FILM COATED ORAL at 13:16

## 2020-03-15 RX ADMIN — DOCUSATE SODIUM 100 MG: 100 CAPSULE, LIQUID FILLED ORAL at 11:42

## 2020-03-15 RX ADMIN — POTASSIUM CHLORIDE 10 MEQ: 750 TABLET, FILM COATED, EXTENDED RELEASE ORAL at 11:42

## 2020-03-15 RX ADMIN — INSULIN LISPRO 2 UNITS: 100 INJECTION, SOLUTION INTRAVENOUS; SUBCUTANEOUS at 11:40

## 2020-03-15 RX ADMIN — DILTIAZEM HYDROCHLORIDE 60 MG: 60 TABLET, FILM COATED ORAL at 11:41

## 2020-03-15 RX ADMIN — DOXAZOSIN 4 MG: 2 TABLET ORAL at 09:19

## 2020-03-15 RX ADMIN — DILTIAZEM HYDROCHLORIDE 60 MG: 60 TABLET, FILM COATED ORAL at 17:27

## 2020-03-15 RX ADMIN — CYANOCOBALAMIN TAB 500 MCG 1000 MCG: 500 TAB at 13:14

## 2020-03-15 RX ADMIN — SODIUM BICARBONATE 650 MG: 650 TABLET ORAL at 21:18

## 2020-03-15 RX ADMIN — INSULIN LISPRO 2 UNITS: 100 INJECTION, SOLUTION INTRAVENOUS; SUBCUTANEOUS at 17:27

## 2020-03-15 RX ADMIN — LEVOFLOXACIN 750 MG: 5 INJECTION, SOLUTION INTRAVENOUS at 11:44

## 2020-03-15 NOTE — PROGRESS NOTES
Problem: Falls - Risk of 
Goal: *Absence of Falls Description: Document Eryn Garcia Fall Risk and appropriate interventions in the flowsheet. 3/15/2020 1419 by Kyle Weinberg RN Outcome: Progressing Towards Goal 
Note: Fall Risk Interventions: 
Mobility Interventions: Communicate number of staff needed for ambulation/transfer Mentation Interventions: Evaluate medications/consider consulting pharmacy Medication Interventions: Evaluate medications/consider consulting pharmacy Elimination Interventions: Call light in reach, Toileting schedule/hourly rounds History of Falls Interventions: Evaluate medications/consider consulting pharmacy 3/15/2020 1416 by Kyle Weinberg RN Outcome: Progressing Towards Goal 
Note: Fall Risk Interventions: 
Mobility Interventions: Communicate number of staff needed for ambulation/transfer Mentation Interventions: Evaluate medications/consider consulting pharmacy Medication Interventions: Evaluate medications/consider consulting pharmacy Elimination Interventions: Call light in reach, Toileting schedule/hourly rounds History of Falls Interventions: Evaluate medications/consider consulting pharmacy Problem: Patient Education: Go to Patient Education Activity Goal: Patient/Family Education 3/15/2020 1419 by Kyle Weinberg RN Outcome: Progressing Towards Goal 
3/15/2020 1416 by Kyle Weinberg RN Outcome: Progressing Towards Goal 
  
Problem: Pressure Injury - Risk of 
Goal: *Prevention of pressure injury Description: Document Valdemar Scale and appropriate interventions in the flowsheet. 3/15/2020 1419 by Kyle Weinberg RN Outcome: Progressing Towards Goal 
Note: Pressure Injury Interventions: 
Sensory Interventions: Assess changes in LOC, Avoid rigorous massage over bony prominences, Keep linens dry and wrinkle-free Moisture Interventions: Absorbent underpads, Apply protective barrier, creams and emollients, Assess need for specialty bed Activity Interventions: Assess need for specialty bed Mobility Interventions: HOB 30 degrees or less, Pressure redistribution bed/mattress (bed type) Nutrition Interventions: Offer support with meals,snacks and hydration Friction and Shear Interventions: Foam dressings/transparent film/skin sealants 3/15/2020 1416 by Irene Lyle RN Outcome: Progressing Towards Goal 
Note: Pressure Injury Interventions: 
Sensory Interventions: Assess changes in LOC, Avoid rigorous massage over bony prominences, Keep linens dry and wrinkle-free Moisture Interventions: Absorbent underpads, Apply protective barrier, creams and emollients, Assess need for specialty bed Activity Interventions: Assess need for specialty bed Mobility Interventions: HOB 30 degrees or less, Pressure redistribution bed/mattress (bed type) Nutrition Interventions: Offer support with meals,snacks and hydration Friction and Shear Interventions: Foam dressings/transparent film/skin sealants Problem: Patient Education: Go to Patient Education Activity Goal: Patient/Family Education 3/15/2020 1419 by Irene Lyle RN Outcome: Progressing Towards Goal 
3/15/2020 1416 by Irene Lyle RN Outcome: Progressing Towards Goal 
  
Problem: Diabetes Self-Management Goal: *Disease process and treatment process Description: Define diabetes and identify own type of diabetes; list 3 options for treating diabetes. 3/15/2020 1419 by Irene Lyle RN Outcome: Progressing Towards Goal 
3/15/2020 1416 by Irene Lyle RN Outcome: Progressing Towards Goal 
Goal: *Incorporating nutritional management into lifestyle Description: Describe effect of type, amount and timing of food on blood glucose; list 3 methods for planning meals. 3/15/2020 1419 by Irene Lyle RN Outcome: Progressing Towards Goal 
3/15/2020 1416 by Irene Lyle RN Outcome: Progressing Towards Goal 
 Goal: *Incorporating physical activity into lifestyle Description: State effect of exercise on blood glucose levels. 3/15/2020 1419 by Mann Conner RN Outcome: Progressing Towards Goal 
3/15/2020 1416 by Mann Conner RN Outcome: Progressing Towards Goal 
Goal: *Developing strategies to promote health/change behavior Description: Define the ABC's of diabetes; identify appropriate screenings, schedule and personal plan for screenings. 3/15/2020 1419 by Mann Conner RN Outcome: Progressing Towards Goal 
3/15/2020 1416 by Mann Conner RN Outcome: Progressing Towards Goal 
Goal: *Using medications safely Description: State effect of diabetes medications on diabetes; name diabetes medication taking, action and side effects. 3/15/2020 1419 by Mann Conner RN Outcome: Progressing Towards Goal 
3/15/2020 1416 by Mann Conner RN Outcome: Progressing Towards Goal 
Goal: *Monitoring blood glucose, interpreting and using results Description: Identify recommended blood glucose targets  and personal targets. 3/15/2020 1419 by Mann Conner RN Outcome: Progressing Towards Goal 
3/15/2020 1416 by Mann Conner RN Outcome: Progressing Towards Goal 
Goal: *Prevention, detection, treatment of acute complications Description: List symptoms of hyper- and hypoglycemia; describe how to treat low blood sugar and actions for lowering  high blood glucose level. 3/15/2020 1419 by Mann Conner RN Outcome: Progressing Towards Goal 
3/15/2020 1416 by Mann Conner RN Outcome: Progressing Towards Goal 
Goal: *Prevention, detection and treatment of chronic complications Description: Define the natural course of diabetes and describe the relationship of blood glucose levels to long term complications of diabetes. 3/15/2020 1419 by Mann Conner RN Outcome: Progressing Towards Goal 
3/15/2020 1416 by Mann Conner RN Outcome: Progressing Towards Goal 
 Goal: *Developing strategies to address psychosocial issues Description: Describe feelings about living with diabetes; identify support needed and support network 3/15/2020 1419 by Jax Hays RN Outcome: Progressing Towards Goal 
3/15/2020 1416 by Jax Hays RN Outcome: Progressing Towards Goal 
Goal: *Insulin pump training 3/15/2020 1419 by Jax Hays RN Outcome: Progressing Towards Goal 
3/15/2020 1416 by Jax Hays RN Outcome: Progressing Towards Goal 
Goal: *Sick day guidelines 3/15/2020 1419 by Jax Hays RN Outcome: Progressing Towards Goal 
3/15/2020 1416 by Jax Hays RN Outcome: Progressing Towards Goal 
Goal: *Patient Specific Goal (EDIT GOAL, INSERT TEXT) 3/15/2020 1419 by Jax Hays RN Outcome: Progressing Towards Goal 
3/15/2020 1416 by Jax Hays RN Outcome: Progressing Towards Goal 
  
Problem: Patient Education: Go to Patient Education Activity Goal: Patient/Family Education 3/15/2020 1419 by Jax Hays RN Outcome: Progressing Towards Goal 
3/15/2020 1416 by Jax Hays RN Outcome: Progressing Towards Goal 
  
Problem: Hypertension Goal: *Blood pressure within specified parameters 3/15/2020 1419 by Jax Hays RN Outcome: Progressing Towards Goal 
3/15/2020 1416 by Jax Hays RN Outcome: Progressing Towards Goal 
Goal: *Fluid volume balance 3/15/2020 1419 by Jax Hays RN Outcome: Progressing Towards Goal 
3/15/2020 1416 by Jax Hays RN Outcome: Progressing Towards Goal 
Goal: *Labs within defined limits 3/15/2020 1419 by Jax Hays RN Outcome: Progressing Towards Goal 
3/15/2020 1416 by Jax Hays RN Outcome: Progressing Towards Goal 
  
Problem: Patient Education: Go to Patient Education Activity Goal: Patient/Family Education 3/15/2020 1419 by Jax Hays RN Outcome: Progressing Towards Goal 
3/15/2020 1416 by Jax Hays RN Outcome: Progressing Towards Goal 
  
Problem: Chronic Renal Failure Goal: *Fluid and electrolytes stabilized 3/15/2020 1419 by Shauna Epps RN Outcome: Progressing Towards Goal 
3/15/2020 1416 by Shauna Epps RN Outcome: Progressing Towards Goal 
  
Problem: Patient Education: Go to Patient Education Activity Goal: Patient/Family Education 3/15/2020 1419 by Shauna Epps RN Outcome: Progressing Towards Goal 
3/15/2020 1416 by Shauna Epps RN Outcome: Progressing Towards Goal 
  
Problem: Pain Goal: *Control of Pain 3/15/2020 1419 by Shauna Epps RN Outcome: Progressing Towards Goal 
3/15/2020 1416 by Shauna Epps RN Outcome: Progressing Towards Goal 
Goal: *PALLIATIVE CARE:  Alleviation of Pain 3/15/2020 1419 by Shauna Epps RN Outcome: Progressing Towards Goal 
3/15/2020 1416 by Shauna Epps RN Outcome: Progressing Towards Goal 
  
Problem: Patient Education: Go to Patient Education Activity Goal: Patient/Family Education 3/15/2020 1419 by Shauna Epps RN Outcome: Progressing Towards Goal 
3/15/2020 1416 by Shauna Epps RN Outcome: Progressing Towards Goal 
  
Problem: Infection - Risk of, Surgical Site Infection Goal: *Absence of surgical site infection signs and symptoms 3/15/2020 1419 by Shauna Epps RN Outcome: Progressing Towards Goal 
3/15/2020 1416 by Shauna Epps RN Outcome: Progressing Towards Goal 
  
Problem: Patient Education: Go to Patient Education Activity Goal: Patient/Family Education 3/15/2020 1419 by Shauna Epps RN Outcome: Progressing Towards Goal 
3/15/2020 1416 by Shauna Epps RN Outcome: Progressing Towards Goal 
  
Problem: Patient Education: Go to Patient Education Activity Goal: Patient/Family Education 3/15/2020 1419 by Shauna Epps RN Outcome: Progressing Towards Goal 
3/15/2020 1416 by Shauna Epps RN Outcome: Progressing Towards Goal 
  
Problem: Afib Pathway: Day 1 Goal: Off Pathway (Use only if patient is Off Pathway) 3/15/2020 1419 by Shauna Epps RN 
 Outcome: Progressing Towards Goal 
3/15/2020 1416 by [place] , RN Outcome: Progressing Towards Goal 
Goal: Activity/Safety 3/15/2020 1419 by Daily , RN Outcome: Progressing Towards Goal 
3/15/2020 1416 by Daily , RN Outcome: Progressing Towards Goal 
Goal: Consults, if ordered 3/15/2020 1419 by Daily , RN Outcome: Progressing Towards Goal 
3/15/2020 1416 by Daily , RN Outcome: Progressing Towards Goal 
Goal: Diagnostic Test/Procedures 3/15/2020 1419 by [place] , RN Outcome: Progressing Towards Goal 
3/15/2020 1416 by Daily , RN Outcome: Progressing Towards Goal 
Goal: Nutrition/Diet 3/15/2020 1419 by [place] , RN Outcome: Progressing Towards Goal 
3/15/2020 1416 by Daily , RN Outcome: Progressing Towards Goal 
Goal: Discharge Planning 3/15/2020 1419 by Daily , RN Outcome: Progressing Towards Goal 
3/15/2020 1416 by Daily , RN Outcome: Progressing Towards Goal 
Goal: Medications 3/15/2020 1419 by Daily , RN Outcome: Progressing Towards Goal 
3/15/2020 1416 by Daily , RN Outcome: Progressing Towards Goal 
Goal: Respiratory 3/15/2020 1419 by [place] , RN Outcome: Progressing Towards Goal 
3/15/2020 1416 by Daily , RN Outcome: Progressing Towards Goal 
Goal: Treatments/Interventions/Procedures 3/15/2020 1419 by Daily , RN Outcome: Progressing Towards Goal 
3/15/2020 1416 by Daily , RN Outcome: Progressing Towards Goal 
Goal: Psychosocial 
3/15/2020 1419 by [place] , RN Outcome: Progressing Towards Goal 
3/15/2020 1416 by [place] , RN Outcome: Progressing Towards Goal 
Goal: *Optimal pain control at patient's stated goal 
3/15/2020 1419 by [place] , RN Outcome: Progressing Towards Goal 
3/15/2020 1416 by Daily , RN Outcome: Progressing Towards Goal 
Goal: *Hemodynamically stable 3/15/2020 1419 by Daily , RN Outcome: Progressing Towards Goal 
 3/15/2020 1416 by Viktor Avalos RN Outcome: Progressing Towards Goal 
Goal: *Stable cardiac rhythm 3/15/2020 1419 by Viktor Avalos RN Outcome: Progressing Towards Goal 
3/15/2020 1416 by Viktor Avalos RN Outcome: Progressing Towards Goal 
Goal: *Lungs clear or at baseline 3/15/2020 1419 by Viktor Avalos RN Outcome: Progressing Towards Goal 
3/15/2020 1416 by Viktor Avalos RN Outcome: Progressing Towards Goal 
Goal: *Labs within defined limits 3/15/2020 1419 by Viktor Avalos RN Outcome: Progressing Towards Goal 
3/15/2020 1416 by Viktor Avalos RN Outcome: Progressing Towards Goal 
Goal: *Describes available resources and support systems 3/15/2020 1419 by Viktor Avalos RN Outcome: Progressing Towards Goal 
3/15/2020 1416 by Viktor Avalos RN Outcome: Progressing Towards Goal 
  
Problem: Afib Pathway: Day 2 Goal: Off Pathway (Use only if patient is Off Pathway) 3/15/2020 1419 by Viktor Avalos RN Outcome: Progressing Towards Goal 
3/15/2020 1416 by Viktor Avalos RN Outcome: Progressing Towards Goal 
Goal: Activity/Safety 3/15/2020 1419 by Viktor Avalos RN Outcome: Progressing Towards Goal 
3/15/2020 1416 by Viktor Avalos RN Outcome: Progressing Towards Goal 
Goal: Consults, if ordered 3/15/2020 1419 by Viktor Avalos RN Outcome: Progressing Towards Goal 
3/15/2020 1416 by Viktor Avalos RN Outcome: Progressing Towards Goal 
Goal: Diagnostic Test/Procedures 3/15/2020 1419 by Viktor Avalos RN Outcome: Progressing Towards Goal 
3/15/2020 1416 by Viktor Avalos RN Outcome: Progressing Towards Goal 
Goal: Nutrition/Diet 3/15/2020 1419 by Viktor Avalos RN Outcome: Progressing Towards Goal 
3/15/2020 1416 by Viktor Avalos RN Outcome: Progressing Towards Goal 
Goal: Discharge Planning 3/15/2020 1419 by Viktor Avalos RN Outcome: Progressing Towards Goal 
3/15/2020 1416 by Viktor Avalos RN Outcome: Progressing Towards Goal 
Goal: Medications 3/15/2020 1419 by Viktor Avalos RN 
 Outcome: Progressing Towards Goal 
3/15/2020 1416 by Kylie Coronado RN Outcome: Progressing Towards Goal 
Goal: Respiratory 3/15/2020 1419 by Kylie Coronado RN Outcome: Progressing Towards Goal 
3/15/2020 1416 by Kylie Coronado RN Outcome: Progressing Towards Goal 
Goal: Treatments/Interventions/Procedures 3/15/2020 1419 by Kylie Coronado RN Outcome: Progressing Towards Goal 
3/15/2020 1416 by Kylie Coronado RN Outcome: Progressing Towards Goal 
Goal: Psychosocial 
3/15/2020 1419 by Kylie Coronado RN Outcome: Progressing Towards Goal 
3/15/2020 1416 by Kylie Coronado RN Outcome: Progressing Towards Goal 
Goal: *Hemodynamically stable 3/15/2020 1419 by Kylie Coronado RN Outcome: Progressing Towards Goal 
3/15/2020 1416 by Kylie Coronado RN Outcome: Progressing Towards Goal 
Goal: *Optimal pain control at patient's stated goal 
3/15/2020 1419 by Kylie Coronado RN Outcome: Progressing Towards Goal 
3/15/2020 1416 by Kylie Coronado RN Outcome: Progressing Towards Goal 
Goal: *Stable cardiac rhythm 3/15/2020 1419 by Kylie Coronado RN Outcome: Progressing Towards Goal 
3/15/2020 1416 by Kylie Coronado RN Outcome: Progressing Towards Goal 
Goal: *Lungs clear or at baseline 3/15/2020 1419 by Kylie Coronado RN Outcome: Progressing Towards Goal 
3/15/2020 1416 by Kylie Coronado RN Outcome: Progressing Towards Goal 
Goal: *Describes available resources and support systems 3/15/2020 1419 by Kylie Coronado RN Outcome: Progressing Towards Goal 
3/15/2020 1416 by Kylie Coronado RN Outcome: Progressing Towards Goal 
  
Problem: Afib Pathway: Day 3 Goal: Off Pathway (Use only if patient is Off Pathway) 3/15/2020 1419 by Kylie Coronado RN Outcome: Progressing Towards Goal 
3/15/2020 1416 by Kylie Coronado RN Outcome: Progressing Towards Goal 
Goal: Activity/Safety 3/15/2020 1419 by Kylie Coronado RN Outcome: Progressing Towards Goal 
3/15/2020 1416 by Kylie Coronado RN Outcome: Progressing Towards Goal 
 Goal: Diagnostic Test/Procedures 3/15/2020 1419 by Irene Lyle RN Outcome: Progressing Towards Goal 
3/15/2020 1416 by Irene Lyle RN Outcome: Progressing Towards Goal 
Goal: Nutrition/Diet 3/15/2020 1419 by Irene Lyle RN Outcome: Progressing Towards Goal 
3/15/2020 1416 by Irene Lyle RN Outcome: Progressing Towards Goal 
Goal: Discharge Planning 3/15/2020 1419 by Irene Lyle RN Outcome: Progressing Towards Goal 
3/15/2020 1416 by Irene Lyle RN Outcome: Progressing Towards Goal 
Goal: Medications 3/15/2020 1419 by Irene Lyle RN Outcome: Progressing Towards Goal 
3/15/2020 1416 by Irene Lyle RN Outcome: Progressing Towards Goal 
Goal: Respiratory 3/15/2020 1419 by Irene Lyle RN Outcome: Progressing Towards Goal 
3/15/2020 1416 by Irene Lyle RN Outcome: Progressing Towards Goal 
Goal: Treatments/Interventions/Procedures 3/15/2020 1419 by Irene Lyle RN Outcome: Progressing Towards Goal 
3/15/2020 1416 by Irene Lyle RN Outcome: Progressing Towards Goal 
Goal: Psychosocial 
3/15/2020 1419 by Irene Lyle RN Outcome: Progressing Towards Goal 
3/15/2020 1416 by Irene Lyle RN Outcome: Progressing Towards Goal 
  
Problem: Afib: Discharge Outcomes (not in CAT) Goal: *Hemodynamically stable 3/15/2020 1419 by Irene Lyle RN Outcome: Progressing Towards Goal 
3/15/2020 1416 by Irene Lyle RN Outcome: Progressing Towards Goal 
Goal: *Stable cardiac rhythm 3/15/2020 1419 by Irene Lyle RN Outcome: Progressing Towards Goal 
3/15/2020 1416 by Irene Lyle RN Outcome: Progressing Towards Goal 
Goal: *Lungs clear or at baseline 3/15/2020 1419 by Irene Lyle RN Outcome: Progressing Towards Goal 
3/15/2020 1416 by Irene Lyle RN Outcome: Progressing Towards Goal 
Goal: *Optimal pain control at patient's stated goal 
3/15/2020 1419 by Irene Lyle RN Outcome: Progressing Towards Goal 
3/15/2020 1416 by Irene Lyle RN 
 Outcome: Progressing Towards Goal 
Goal: *Identifies cardiac risk factors 3/15/2020 1419 by Haydee Rodrigues RN Outcome: Progressing Towards Goal 
3/15/2020 1416 by Haydee Rodrigues RN Outcome: Progressing Towards Goal 
Goal: *Verbalizes home exercise program, activity guidelines, cardiac precautions 3/15/2020 1419 by Haydee Rodrigues RN Outcome: Progressing Towards Goal 
3/15/2020 1416 by Haydee Rodrigues RN Outcome: Progressing Towards Goal 
Goal: *Verbalizes understanding and describes prescribed diet 3/15/2020 1419 by Haydee Rodrigues RN Outcome: Progressing Towards Goal 
3/15/2020 1416 by Haydee Rodrigues RN Outcome: Progressing Towards Goal 
Goal: *Verbalizes understanding and describes medication purposes and frequencies 3/15/2020 1419 by Haydee Rodrigues RN Outcome: Progressing Towards Goal 
3/15/2020 1416 by Haydee Rodrigues RN Outcome: Progressing Towards Goal 
Goal: *Anxiety reduced or absent 3/15/2020 1419 by Haydee Rodrigues RN Outcome: Progressing Towards Goal 
3/15/2020 1416 by Haydee Rodrigues RN Outcome: Progressing Towards Goal 
Goal: *Understands and describes signs and symptoms to report to providers(Stroke Metric) 3/15/2020 1419 by Haydee Rodrigues RN Outcome: Progressing Towards Goal 
3/15/2020 1416 by Haydee Rodrigues RN Outcome: Progressing Towards Goal 
Goal: *Describes follow-up/return visits to physicians 3/15/2020 1419 by Haydee Rodrigues RN Outcome: Progressing Towards Goal 
3/15/2020 1416 by Haydee Rodrigues RN Outcome: Progressing Towards Goal 
Goal: *Describes available resources and support systems 3/15/2020 1419 by Haydee Rodrigues RN Outcome: Progressing Towards Goal 
3/15/2020 1416 by Haydee Rodrigues RN Outcome: Progressing Towards Goal 
Goal: *Influenza immunization 3/15/2020 1419 by Haydee Rodrigues RN Outcome: Progressing Towards Goal 
3/15/2020 1416 by Haydee Rodrigues RN Outcome: Progressing Towards Goal 
Goal: *Pneumococcal immunization 3/15/2020 1419 by Haydee Rodrigues RN 
 Outcome: Progressing Towards Goal 
3/15/2020 1416 by Zeke العراقي RN Outcome: Progressing Towards Goal 
Goal: *Describes smoking cessation resources 3/15/2020 1419 by Zeke العراقي RN Outcome: Progressing Towards Goal 
3/15/2020 1416 by Zeke العراقي RN Outcome: Progressing Towards Goal

## 2020-03-15 NOTE — PROGRESS NOTES
Bedside([facility]) Shift report given to GURINDER Price. Pt more alert during shift still A/O x2. Poor intake despite encouragement.  Pt placed on turn team.

## 2020-03-15 NOTE — PROGRESS NOTES
Hospitalist Progress Note NAME: Ester Richardson :  1960 MRN:  [id number] Assessment / Plan: 
Acute Encephalopathy not POA- noted today in preop holding 3/14 Suspect metabolic due to hypoglycemia versus TIA versus Psychiatric event B12= 1403 FA= 85 Ammonia = 25 Stat CT head = Moderate small vessel ischemic changes. Chronic sinus disease. No 
acute intracranial abnormality Examination non focal 
FS 86--> 98 , s/p 1/2 amp D50 in preop holding-- some improvement in mental status but pt still confused- oriented x 1 (person) IP neurology consult appreciated MRI ordered- will need to be on Monday as pt has PPM 
EEG Renally dose Cefepime if still needed (pt completed therapy now anyways) Cont to hold off Lantus for now & will allow to eat diet so her FS remain up in the mean time. INR therapeutic now at 2.5 on coumadin Left leg cellulitis and ulceration of the left foot 
- Continue with empiric Vancomycin and Cefepime  For now - Blood cultures with no growth to date - CT leg noted - Venous US LLE negative for DVT Wound Cx= growing MSSA & sensitive Proteus 
 
- Pain control prn - Elevated limb as needed  
- POD#5 Debridement left foot wound and bone biopsy . Seen by Dr Claudeen Kluver yesterday- was planned for Graft surgery today but pt had new AMS in preop holding 3/14-- procedure cancelled for now NWB on L foot noted 
?need for wound vacc indicated Follow up with Dr Claudeen Kluver for further plans on Monday now once cleared by Neurology 
  
  
DM- was hypoglycemic due to poor PO intake noted- FS still running on low end in past 24 hrs Had decreased Lantus to half at 10 units daily 3/13-- will keep on hold for now Holding scheduled Novolog 5 units with meals for now Cont SSI lispro as needed 
  
Atrial fibrillation/RVR 
- s/p Cardizem bolus and remains on Cardizem gtt with good rate control -- now transitioning to oral diltiazem 
- Continue metoprolol - Continue coumadin; pharmacy consult for dosing, DC bridging lovenox today as INR therapeutic 
- supra therapeutic resolved  INR 2.5 today - Cardiology consult reviewed and appreciated  
 
Elevated troponin - Likely due to demand with rapid atrial fibrillation - Downtrending  
  
CKD4- Cr stable at ~ 12.0 
- Appears to be stable at baseline creatinine - Renally dose medications  
- sodium bicarbonate 650 mg bid - BUMEX 1 mg BID per nephrology - Give IV iron - Epogen - Nephrology on board. Greatly appreciate input  
  
HTN 
- Continue home medications: hydralazine,Toprol, Cardura , cardizem  
- hold clonidine and  Metolazone HLD 
  
Anxiety  
- Continue Zoloft, Buspar 
  
CHF 
- No exacerbation - On  Bumex  
  
SSS 
- Pacemaker Morbid obesity due to BMI >35 with DM, HTN / Body mass index is 34.84 kg/m². Code status: Full Prophylaxis: anticoagulated with warfarin Recommended Disposition: Home w/Family versus HH/SNF TBD post operatively on monday Subjective: Chief Complaint / Reason for Physician Visit: follow up rapid atrial fibrillation and cellulitis, foot ulcers, AMS at preop holding yesterday \"I am ok\" Review of Systems: 
Symptom Y/N Comments  Symptom Y/N Comments Fever/Chills    Chest Pain n   
Poor Appetite    Edema y Cough    Abdominal Pain n   
Sputum    Joint Pain SOB/CHOW    Pruritis/Rash Nausea/vomit    Tolerating PT/OT Diarrhea    Tolerating Diet Constipation    Other Could NOT obtain due to: AMS Objective: VITALS:  
Last 24hrs VS reviewed since prior progress note. Most recent are: 
Patient Vitals for the past 24 hrs: 
 Temp Pulse Resp BP SpO2  
03/15/20 0709 97.3 °F (36.3 °C) 77 14 151/59 98 % 03/15/20 0419 97.3 °F (36.3 °C) 79 16 131/53 99 % 03/14/20 2328 97.5 °F (36.4 °C) 75 16 148/66 98 % 03/14/20 1927 97.9 °F (36.6 °C) 68 16 131/50 99 % 03/14/20 1505 97.5 °F (36.4 °C) 76 16 144/60 96 % 03/14/20 1119 97.4 °F (36.3 °C) 86 16 156/77 99 % 03/14/20 0928 97.6 °F (36.4 °C) 91 15 184/82 99 % Intake/Output Summary (Last 24 hours) at 3/15/2020 9868 Last data filed at 3/14/2020 2246 Gross per 24 hour Intake  Output 800 ml Net -800 ml PHYSICAL EXAM: 
General: WD, WN. Alert, cooperative, no acute distress   
EENT:  EOMI. Anicteric sclerae. MMM Resp:  CTA bilaterally, no wheezing or rales. No accessory muscle use CV:  Irregularly irregular  rhythm, +LLE edema 1-2+ GI:  Soft, Non distended, Non tender.  +Bowel sounds Neurologic:  Alert and oriented X1 (new), normal speech, moving all extremities well, hand  equal, slow to answer noted, improved MS today Psych:   Good insight. Not anxious nor agitated Skin:  No jaundice. Erythema of distal left lower extremity and foot, with heat and tenderness. Reviewed most current lab test results and cultures  YES Reviewed most current radiology test results   YES Review and summation of old records today    NO Reviewed patient's current orders and MAR    YES 
PMH/ reviewed - no change compared to H&P 
________________________________________________________________________ Care Plan discussed with: 
  Comments Patient x Family RN x Care Manager Consultant  x Neurology Dr Sindy Cho yesterday Multidiciplinary team rounds were held today with , nursing, pharmacist and clinical coordinator. Patient's plan of care was discussed; medications were reviewed and discharge planning was addressed. ________________________________________________________________________ Total NON critical care TIME:  26   Minutes Total CRITICAL CARE TIME Spent:   Minutes non procedure based Comments >50% of visit spent in counseling and coordination of care    
________________________________________________________________________ Warner Arie, MD  
 
 Procedures: see electronic medical records for all procedures/Xrays and details which were not copied into this note but were reviewed prior to creation of Plan. LABS: 
I reviewed today's most current labs and imaging studies. Pertinent labs include: 
Recent Labs  
  03/15/20 
0412 03/13/20 
0410 WBC 10.6 13.4* HGB 9.6* 9.3* HCT 31.6* 30.2* * 551* Recent Labs  
  03/15/20 
0412 03/14/20 
0222 03/13/20 
0410  138 137  
K 4.6 3.8 3.8  109* 109* CO2 24 23 23 * 97 109* BUN 28* 24* 24* CREA 2.06* 1.95* 1.86* CA 8.5 9.1 8.7 INR 2.5* 1.8* 1.5* Signed: Adaline Epley, MD

## 2020-03-15 NOTE — PROGRESS NOTES
Problem: Falls - Risk of 
Goal: *Absence of Falls Description: Document Olivia Stoddard Fall Risk and appropriate interventions in the flowsheet. Outcome: Progressing Towards Goal 
Note: Fall Risk Interventions: 
Mobility Interventions: Communicate number of staff needed for ambulation/transfer Mentation Interventions: Evaluate medications/consider consulting pharmacy Medication Interventions: Evaluate medications/consider consulting pharmacy Elimination Interventions: Call light in reach, Toileting schedule/hourly rounds History of Falls Interventions: Evaluate medications/consider consulting pharmacy Problem: Patient Education: Go to Patient Education Activity Goal: Patient/Family Education Outcome: Progressing Towards Goal 
  
Problem: Pressure Injury - Risk of 
Goal: *Prevention of pressure injury Description: Document Valdemar Scale and appropriate interventions in the flowsheet. Outcome: Progressing Towards Goal 
Note: Pressure Injury Interventions: 
Sensory Interventions: Assess changes in LOC, Avoid rigorous massage over bony prominences, Keep linens dry and wrinkle-free Moisture Interventions: Absorbent underpads, Apply protective barrier, creams and emollients, Assess need for specialty bed Activity Interventions: Assess need for specialty bed Mobility Interventions: HOB 30 degrees or less, Pressure redistribution bed/mattress (bed type) Nutrition Interventions: Offer support with meals,snacks and hydration Friction and Shear Interventions: Foam dressings/transparent film/skin sealants Problem: Patient Education: Go to Patient Education Activity Goal: Patient/Family Education Outcome: Progressing Towards Goal 
  
Problem: Diabetes Self-Management Goal: *Disease process and treatment process Description: Define diabetes and identify own type of diabetes; list 3 options for treating diabetes.  
Outcome: Progressing Towards Goal 
 Goal: *Incorporating nutritional management into lifestyle Description: Describe effect of type, amount and timing of food on blood glucose; list 3 methods for planning meals. Outcome: Progressing Towards Goal 
Goal: *Incorporating physical activity into lifestyle Description: State effect of exercise on blood glucose levels. Outcome: Progressing Towards Goal 
Goal: *Developing strategies to promote health/change behavior Description: Define the ABC's of diabetes; identify appropriate screenings, schedule and personal plan for screenings. Outcome: Progressing Towards Goal 
Goal: *Using medications safely Description: State effect of diabetes medications on diabetes; name diabetes medication taking, action and side effects. Outcome: Progressing Towards Goal 
Goal: *Monitoring blood glucose, interpreting and using results Description: Identify recommended blood glucose targets  and personal targets. Outcome: Progressing Towards Goal 
Goal: *Prevention, detection, treatment of acute complications Description: List symptoms of hyper- and hypoglycemia; describe how to treat low blood sugar and actions for lowering  high blood glucose level. Outcome: Progressing Towards Goal 
Goal: *Prevention, detection and treatment of chronic complications Description: Define the natural course of diabetes and describe the relationship of blood glucose levels to long term complications of diabetes. Outcome: Progressing Towards Goal 
Goal: *Developing strategies to address psychosocial issues Description: Describe feelings about living with diabetes; identify support needed and support network Outcome: Progressing Towards Goal 
Goal: *Insulin pump training Outcome: Progressing Towards Goal 
Goal: *Sick day guidelines Outcome: Progressing Towards Goal 
Goal: *Patient Specific Goal (EDIT GOAL, INSERT TEXT) Outcome: Progressing Towards Goal 
  
Problem: Patient Education: Go to Patient Education Activity Goal: Patient/Family Education Outcome: Progressing Towards Goal 
  
Problem: Hypertension Goal: *Blood pressure within specified parameters Outcome: Progressing Towards Goal 
Goal: *Fluid volume balance Outcome: Progressing Towards Goal 
Goal: *Labs within defined limits Outcome: Progressing Towards Goal 
  
Problem: Patient Education: Go to Patient Education Activity Goal: Patient/Family Education Outcome: Progressing Towards Goal 
  
Problem: Chronic Renal Failure Goal: *Fluid and electrolytes stabilized Outcome: Progressing Towards Goal 
  
Problem: Patient Education: Go to Patient Education Activity Goal: Patient/Family Education Outcome: Progressing Towards Goal 
  
Problem: Pain Goal: *Control of Pain Outcome: Progressing Towards Goal 
Goal: *PALLIATIVE CARE:  Alleviation of Pain Outcome: Progressing Towards Goal 
  
Problem: Patient Education: Go to Patient Education Activity Goal: Patient/Family Education Outcome: Progressing Towards Goal 
  
Problem: Infection - Risk of, Surgical Site Infection Goal: *Absence of surgical site infection signs and symptoms Outcome: Progressing Towards Goal 
  
Problem: Patient Education: Go to Patient Education Activity Goal: Patient/Family Education Outcome: Progressing Towards Goal 
  
Problem: Patient Education: Go to Patient Education Activity Goal: Patient/Family Education Outcome: Progressing Towards Goal 
  
Problem: Afib Pathway: Day 1 Goal: Off Pathway (Use only if patient is Off Pathway) Outcome: Progressing Towards Goal 
Goal: Activity/Safety Outcome: Progressing Towards Goal 
Goal: Consults, if ordered Outcome: Progressing Towards Goal 
Goal: Diagnostic Test/Procedures Outcome: Progressing Towards Goal 
Goal: Nutrition/Diet Outcome: Progressing Towards Goal 
Goal: Discharge Planning Outcome: Progressing Towards Goal 
Goal: Medications Outcome: Progressing Towards Goal 
Goal: Respiratory Outcome: Progressing Towards Goal 
 Goal: Treatments/Interventions/Procedures Outcome: Progressing Towards Goal 
Goal: Psychosocial 
Outcome: Progressing Towards Goal 
Goal: *Optimal pain control at patient's stated goal 
Outcome: Progressing Towards Goal 
Goal: *Hemodynamically stable Outcome: Progressing Towards Goal 
Goal: *Stable cardiac rhythm Outcome: Progressing Towards Goal 
Goal: *Lungs clear or at baseline Outcome: Progressing Towards Goal 
Goal: *Labs within defined limits Outcome: Progressing Towards Goal 
Goal: *Describes available resources and support systems Outcome: Progressing Towards Goal 
  
Problem: Afib Pathway: Day 2 Goal: Off Pathway (Use only if patient is Off Pathway) Outcome: Progressing Towards Goal 
Goal: Activity/Safety Outcome: Progressing Towards Goal 
Goal: Consults, if ordered Outcome: Progressing Towards Goal 
Goal: Diagnostic Test/Procedures Outcome: Progressing Towards Goal 
Goal: Nutrition/Diet Outcome: Progressing Towards Goal 
Goal: Discharge Planning Outcome: Progressing Towards Goal 
Goal: Medications Outcome: Progressing Towards Goal 
Goal: Respiratory Outcome: Progressing Towards Goal 
Goal: Treatments/Interventions/Procedures Outcome: Progressing Towards Goal 
Goal: Psychosocial 
Outcome: Progressing Towards Goal 
Goal: *Hemodynamically stable Outcome: Progressing Towards Goal 
Goal: *Optimal pain control at patient's stated goal 
Outcome: Progressing Towards Goal 
Goal: *Stable cardiac rhythm Outcome: Progressing Towards Goal 
Goal: *Lungs clear or at baseline Outcome: Progressing Towards Goal 
Goal: *Describes available resources and support systems Outcome: Progressing Towards Goal 
  
Problem: Afib Pathway: Day 3 Goal: Off Pathway (Use only if patient is Off Pathway) Outcome: Progressing Towards Goal 
Goal: Activity/Safety Outcome: Progressing Towards Goal 
Goal: Diagnostic Test/Procedures Outcome: Progressing Towards Goal 
Goal: Nutrition/Diet Outcome: Progressing Towards Goal 
 Goal: Discharge Planning Outcome: Progressing Towards Goal 
Goal: Medications Outcome: Progressing Towards Goal 
Goal: Respiratory Outcome: Progressing Towards Goal 
Goal: Treatments/Interventions/Procedures Outcome: Progressing Towards Goal 
Goal: Psychosocial 
Outcome: Progressing Towards Goal 
  
Problem: Afib: Discharge Outcomes (not in CAT) Goal: *Hemodynamically stable Outcome: Progressing Towards Goal 
Goal: *Stable cardiac rhythm Outcome: Progressing Towards Goal 
Goal: *Lungs clear or at baseline Outcome: Progressing Towards Goal 
Goal: *Optimal pain control at patient's stated goal 
Outcome: Progressing Towards Goal 
Goal: *Identifies cardiac risk factors Outcome: Progressing Towards Goal 
Goal: *Verbalizes home exercise program, activity guidelines, cardiac precautions Outcome: Progressing Towards Goal 
Goal: *Verbalizes understanding and describes prescribed diet Outcome: Progressing Towards Goal 
Goal: *Verbalizes understanding and describes medication purposes and frequencies Outcome: Progressing Towards Goal 
Goal: *Anxiety reduced or absent Outcome: Progressing Towards Goal 
Goal: *Understands and describes signs and symptoms to report to providers(Stroke Metric) Outcome: Progressing Towards Goal 
Goal: *Describes follow-up/return visits to physicians Outcome: Progressing Towards Goal 
Goal: *Describes available resources and support systems Outcome: Progressing Towards Goal 
Goal: *Influenza immunization Outcome: Progressing Towards Goal 
Goal: *Pneumococcal immunization Outcome: Progressing Towards Goal 
Goal: *Describes smoking cessation resources Outcome: Progressing Towards Goal

## 2020-03-15 NOTE — PROGRESS NOTES
Physical Therapy Note Orders acknowledged. Noted orders placed for 3/16/2020 at 0000. Will follow up for PT evaluation tomorrow as appropriate.  
 
Thank you, 
Ashtyn Clancy, PT, DPT

## 2020-03-15 NOTE — PROGRESS NOTES
{SIGNIFICANT CHANGES DURING SHIFT:  Pt unable to swallow PO meds at this time. Attempted to give HS meds. CONCERNS TO ADDRESS WITH MD:   
 
 
 
 
[facility] NURSING NOTE Admission Date 3/8/2020 Admission Diagnosis Atrial fibrillation with RVR ([address] [I48.91] Left leg cellulitis [T06.234] Consults IP CONSULT TO CARDIOLOGY 
IP CONSULT TO PODIATRY IP CONSULT TO NEPHROLOGY 
IP CONSULT TO NEUROLOGY Cardiac Monitoring [x] Yes [] No  
  
Purposeful Hourly Rounding [x] Yes   
Rayne Score Total Score: 2 Rayne score 3 or > [x] Bed Alarm [] Avasys [] 1:1 sitter [] Patient refused (Signed refusal form in chart) Valdemar Score Valdemar Score: 15 Valdemar score 14 or < [] PMT consult [x] Wound Care consult  
 []  Specialty bed  [x] Nutrition consult Influenza Vaccine Received Flu Vaccine for Current Season (usually Sept-March): Yes Oxygen needs? [x] Room air Oxygen @  []1L    []2L    []3L   []4L    []5L   []6L via NC Chronic home O2 use? [] Yes [x] No 
Perform O2 challenge test and document in progress note using smartphrase (.Homeoxygen) Last bowel movement Last Bowel Movement Date: 03/12/20 Urinary Catheter External Female Catheter 03/10/20-Urine Output (mL): 800 ml LDAs Peripheral IV 03/14/20 Posterior; Left Forearm (Active) Site Assessment Clean, dry, & intact 3/14/2020  9:36 PM  
Phlebitis Assessment 0 3/14/2020  9:36 PM  
Infiltration Assessment 0 3/14/2020  9:36 PM  
Dressing Status Clean, dry, & intact 3/14/2020  9:36 PM  
Dressing Type Transparent 3/14/2020  9:36 PM  
Hub Color/Line Status Blue;Patent; Flushed 3/14/2020  9:36 PM  
      
External Female Catheter 03/10/20 (Active) Site Assessment Clean, dry, & intact 3/15/2020  2:58 AM  
Repositioned Yes 3/15/2020  2:58 AM  
Perineal Care Yes 3/15/2020  2:58 AM  
Wick Changed Yes 3/15/2020  2:58 AM  
Suction Canister/Tubing Changed No 3/15/2020  2:58 AM  
Urine Output (mL) 800 ml 3/14/2020 10:46 PM  
 Readmission Risk Assessment Tool Score High Risk 35 Total Score 3 Has Seen PCP in Last 6 Months (Yes=3, No=0) 3 Patient Length of Stay (>5 days = 3) 4 IP Visits Last 12 Months (1-3=4, 4=9, >4=11) 23 Charlson Comorbidity Score (Age + Comorbid Conditions) Criteria that do not apply:  
 . Living with Significant Other. Assisted Living. LTAC. SNF. or  
Rehab Pt. Coverage (Medicare=5 , Medicaid, or Self-Pay=4) Expected Length of Stay 3d 7h Actual Length of Stay 7

## 2020-03-15 NOTE — PROGRESS NOTES
NEUROLOGY NOTE Chief Complaint Patient presents with  Leg Swelling  
  leg pain, swelling, and redness x 24 hours; reports that prior to that she had been experiencing fever and chills; hx of DM and cellulitis, but to other leg SUBJECTIVE: 
Pt slightly more awake today MRI brain and eeg tomorrow HPI The patient is a 25-year-old woman with initially presented on 3/8/2020 as she was having fever and chills and yesterday she saw that her left leg was swollen and red. She was also in A. fib with RVR with heart rate more than 150 she was found to have left leg cellulitis. She was started on vancomycin and cefepime. The patient did have infected diabetic wound. The patient does also have CKD 3 likely from diabetes nephropathy. Patient did have left foot debridement and bone biopsy from the left fifth metatarsal on 3/10/2020. The work-up showed no evidence of osteomyelitis the exposed bone is high risk for osteomyelitis and the skin substitute graft needs to be placed to cover the bone. On 2/4/2020, the patient was having altered mental status. The patient surgery was delayed. The patient's blood sugar was 86 which improved to 98 and there was some improvement in mental status. The patient is still confused and oriented x1. Patient's blood pressure was 184/82 at around 9 AM. Patient did have a CT scan of the head which showed moderate small vessel ischemic change. ROS A ten system review of constitutional, cardiovascular, respiratory, musculoskeletal, endocrine, skin, SHEENT, genitourinary, psychiatric and neurologic systems was obtained and is unremarkable except as stated in HPI  
 
PMH Past Medical History:  
Diagnosis Date  Acquired lymphedema Right arm  Arrhythmia  Asthma  Atrial fibrillation ([facility] Dr. Gwen Govea and Dr. Yared Pritchard [facility])  Cancer ([facility] bilateral breast  
 Chronic kidney disease  Diabetes mellitus type II   
  Dizziness  Essential hypertension  Hyperlipidemia  Hypertension  Long term (current) use of anticoagulants  Morbid obesity ([facility] 3/14/2012  Nausea & vomiting  Osteoarthritis  Pacemaker  Sick sinus syndrome ([facility] Family History Problem Relation Age of Onset  Cancer Mother  Heart Disease Mother  Alcohol abuse Father  Diabetes Brother  Hypertension Brother [address] Social History Socioeconomic History  Marital status:  Spouse name: Not on file  Number of children: Not on file  Years of education: Not on file  Highest education level: Not on file Tobacco Use  Smoking status: Never Smoker  Smokeless tobacco: Never Used Substance and Sexual Activity  Alcohol use: Yes Comment: rare  Drug use: No  
 
 
ALLERGIES Allergies Allergen Reactions  Ace Inhibitors Cough  Amlodipine Swelling  Avapro [Irbesartan] Unable to Obtain  Bactrim [Sulfamethoxazole-Trimethoprim] Other (comments) Sore mouth  Captopril Cough  Carvedilol Diarrhea [address]  Morphine Nausea and Vomiting and Swelling  Penicillins Hives Tolerated cefepime 1/2020  Pravachol [Pravastatin] Nausea Only  Seafood [Shellfish Containing Products] Hives  Singulair [Montelukast] Other (comments)  
  palpitations PHYSICAL EXAMINATION:  
Patient Vitals for the past 24 hrs: 
 Temp Pulse Resp BP SpO2  
03/15/20 1101 97.7 °F (36.5 °C) 77 16 125/55 99 % 03/15/20 0709 97.3 °F (36.3 °C) 77 14 151/59 98 % 03/15/20 0419 97.3 °F (36.3 °C) 79 16 131/53 99 % 03/14/20 2328 97.5 °F (36.4 °C) 75 16 148/66 98 % 03/14/20 1927 97.9 °F (36.6 °C) 68 16 131/50 99 % 03/14/20 1505 97.5 °F (36.4 °C) 76 16 144/60 96 % General:  
General appearance: Pt is in no acute distress Distal pulses are preserved Neurological Examination: Mental Status:  Awake O x2. Speech is slow . Follows commands, has abnormal fund of knowledge, attention, short term recall, comprehension and insight. Cranial Nerves: Visual fields are full. PERRL, Extraocular movements are full. Facial sensation intact. Facial movement intact. Hearing intact to conversation. Palate elevates symmetrically. Shoulder shrug symmetric. Tongue midline. Motor: Strength is 5/5 in all 4 ext. Normal tone. No atrophy. Sensation: Light touch - Normal 
 
Reflexes: DTRs 1+ in UE and absent in LE Skin: No significant bruising or lacerations. LAB DATA REVIEWED:   
Recent Results (from the past 24 hour(s)) AMMONIA Collection Time: 03/14/20  2:49 PM  
Result Value Ref Range Ammonia 25 <32 UMOL/L  
VITAMIN B12 & FOLATE Collection Time: 03/14/20  2:49 PM  
Result Value Ref Range Vitamin B12 1,403 (H) 193 - 986 pg/mL Folate 8.5 5.0 - 21.0 ng/mL GLUCOSE, POC Collection Time: 03/14/20  4:53 PM  
Result Value Ref Range Glucose (POC) 97 65 - 100 mg/dL Performed by Alana Garcia GLUCOSE, POC Collection Time: 03/14/20  9:15 PM  
Result Value Ref Range Glucose (POC) 108 (H) 65 - 100 mg/dL Performed by Luigi Amato METABOLIC PANEL, BASIC Collection Time: 03/15/20  4:12 AM  
Result Value Ref Range Sodium 139 136 - 145 mmol/L Potassium 4.6 3.5 - 5.1 mmol/L Chloride 108 97 - 108 mmol/L  
 CO2 24 21 - 32 mmol/L Anion gap 7 5 - 15 mmol/L Glucose 127 (H) 65 - 100 mg/dL BUN 28 (H) 6 - 20 MG/DL Creatinine 2.06 (H) 0.55 - 1.02 MG/DL  
 BUN/Creatinine ratio 14 12 - 20 GFR est AA 30 (L) >60 ml/min/1.73m2 GFR est non-AA 25 (L) >60 ml/min/1.73m2 Calcium 8.5 8.5 - 10.1 MG/DL PROTHROMBIN TIME + INR Collection Time: 03/15/20  4:12 AM  
Result Value Ref Range INR 2.5 (H) 0.9 - 1.1 Prothrombin time 24.5 (H) 9.0 - 11.1 sec CBC WITH AUTOMATED DIFF Collection Time: 03/15/20  4:12 AM  
Result Value Ref Range WBC 10.6 3.6 - 11.0 K/uL  
 RBC 3.35 (L) 3.80 - 5.20 M/uL HGB 9.6 (L) 11.5 - 16.0 g/dL HCT 31.6 (L) 35.0 - 47.0 % MCV 94.3 80.0 - 99.0 FL  
 MCH 28.7 26.0 - 34.0 PG  
 MCHC 30.4 30.0 - 36.5 g/dL  
 RDW 16.0 (H) 11.5 - 14.5 % PLATELET 040 (H) 720 - 400 K/uL MPV 10.2 8.9 - 12.9 FL  
 NRBC 0.0 0  WBC ABSOLUTE NRBC 0.00 0.00 - 0.01 K/uL NEUTROPHILS 78 (H) 32 - 75 % LYMPHOCYTES 8 (L) 12 - 49 % MONOCYTES 10 5 - 13 % EOSINOPHILS 1 0 - 7 % BASOPHILS 1 0 - 1 % IMMATURE GRANULOCYTES 2 (H) 0.0 - 0.5 % ABS. NEUTROPHILS 8.3 (H) 1.8 - 8.0 K/UL  
 ABS. LYMPHOCYTES 0.8 0.8 - 3.5 K/UL  
 ABS. MONOCYTES 1.1 (H) 0.0 - 1.0 K/UL  
 ABS. EOSINOPHILS 0.1 0.0 - 0.4 K/UL  
 ABS. BASOPHILS 0.1 0.0 - 0.1 K/UL  
 ABS. IMM. GRANS. 0.2 (H) 0.00 - 0.04 K/UL  
 DF AUTOMATED    
 RBC COMMENTS ANISOCYTOSIS 1+ GLUCOSE, POC Collection Time: 03/15/20  8:29 AM  
Result Value Ref Range Glucose (POC) 139 (H) 65 - 100 mg/dL Performed by Mario Haynes, POC Collection Time: 03/15/20 11:03 AM  
Result Value Ref Range Glucose (POC) 145 (H) 65 - 100 mg/dL Performed by Obdulio Abrams Imaging review: 
CT Head Normal 
 
HOME MEDS Prior to Admission Medications Prescriptions Last Dose Informant Patient Reported? Taking?  
acetaminophen 500 mg tab 500 mg, diphenhydrAMINE 25 mg cap 25 mg   Yes No  
Sig: Take 2 Doses by mouth nightly. bumetanide (BUMEX) 1 mg tablet  Self Yes No  
Sig: Take 2 mg by mouth daily. busPIRone (BUSPAR) 10 mg tablet  Self No No  
Sig: TAKE ONE TABLET BY MOUTH TWICE A DAY cholecalciferol, VITAMIN D3, (VITAMIN D3) 5,000 unit tab tablet  Self Yes No  
Sig: Take 5,000 Units by mouth daily. cloNIDine HCl (CATAPRES) 0.3 mg tablet  Self Yes No  
Sig: Take 0.6 mg by mouth nightly. cyanocobalamin (VITAMIN B-12) 1,000 mcg sublingual tablet  Self Yes No  
Sig: Take 1,000 mcg by mouth daily.   
doxazosin (CARDURA) 4 mg tablet  Self No No  
 Sig: TAKE ONE TABLET BY MOUTH ONCE NIGHTLY  
hydrALAZINE (APRESOLINE) 100 mg tablet   No No  
Sig: TAKE ONE TABLET BY MOUTH THREE TIMES A DAY  
insulin glargine (LANTUS U-100 INSULIN) 100 unit/mL injection   No No  
Sig: Inject 20 units daily  
insulin lispro (HUMALOG) 100 unit/mL kwikpen  Self Yes No  
Si units with dinner for sugars over 200  
metolazone (ZAROXOLYN) 5 mg tablet  Self No No  
Sig: Take 1 Tab by mouth daily as needed (take if develop increased LE edema, weight gain > 5 pounds. ). metoprolol succinate (TOPROL-XL) 100 mg tablet  Self No No  
Sig: TAKE TWO TABLETS BY MOUTH DAILY potassium chloride SR (KLOR-CON 10) 10 mEq tablet   Yes No  
Sig: Take 10 mEq by mouth daily. Indications: Patient states she intends to take this medication untl she discusses with Dr. Alexandro Waite on 19.  
sertraline (ZOLOFT) 50 mg tablet   No No  
Sig: TAKE ONE AND ONE-HALF (1 & 1/2) TABLET BY MOUTH DAILY  
vitamin A 8,000 unit capsule  Self Yes No  
Sig: Take 8,000 Units by mouth daily. warfarin (COUMADIN) 4 mg tablet 3/5/2020  Yes Yes Sig: Take 2 mg by mouth five (5) days a week. Take 1 tablet (4 mg) on Sun and Wed and a half tablet (2 mg) the other 5 days. warfarin (COUMADIN) 4 mg tablet 3/4/2020  Yes Yes Sig: Take 4 mg by mouth two (2) times a week. Take 1 tablet (4 mg) on Sun and Wed and a half tablet (2 mg) the other 5 days. Facility-Administered Medications: None CURRENT MEDS Current Facility-Administered Medications Medication Dose Route Frequency  levoFLOXacin (LEVAQUIN) 750 mg in D5W IVPB  750 mg IntraVENous Q48H  
 metroNIDAZOLE (FLAGYL) IVPB premix 500 mg  500 mg IntraVENous Q12H  
 alcohol 62% (NOZIN) nasal  1 Ampule  1 Ampule Topical Q12H  
 epoetin viet-epbx (RETACRIT) 12,000 Units combo injection  12,000 Units SubCUTAneous Q MON, WED & FRI  
 docusate sodium (COLACE) capsule 100 mg  100 mg Oral BID  bumetanide (BUMEX) injection 1 mg  1 mg IntraVENous BID  
  sodium hypochlorite (HALF STRENGTH DAKIN'S) 0.25% irrigation (bottle)   Topical DAILY  dilTIAZem (CARDIZEM) IR tablet 60 mg  60 mg Oral TIDAC  WARFARIN INFORMATION NOTE (COUMADIN)   Other QPM  
 sodium bicarbonate tablet 650 mg  650 mg Oral TID  busPIRone (BUSPAR) tablet 10 mg  10 mg Oral DAILY  cholecalciferol (VITAMIN D3) (1000 Units /25 mcg) tablet 5 Tab  5,000 Units Oral DAILY  cyanocobalamin (VITAMIN B12) tablet 1,000 mcg  1,000 mcg Oral DAILY  doxazosin (CARDURA) tablet 4 mg  4 mg Oral DAILY  hydrALAZINE (APRESOLINE) tablet 100 mg  100 mg Oral Q8H  
 metoprolol succinate (TOPROL-XL) XL tablet 100 mg  100 mg Oral DAILY  potassium chloride SR (KLOR-CON 10) tablet 10 mEq  10 mEq Oral DAILY  sertraline (ZOLOFT) tablet 50 mg  50 mg Oral DAILY  sodium chloride (NS) flush 5-40 mL  5-40 mL IntraVENous Q8H  
 insulin lispro (HUMALOG) injection   SubCUTAneous AC&HS IMPRESSION/RECOMMENDATIONS: 
Charbel Sheehan is a 60 y.o. female who presents with A fib and cellulitis. Negative for osteomyelitis. Pt was supposed to get a skin graft but did have alt mental status. BS is in normal range. Non focal Exam. Possibility of stroke/tia is low but the pt does have A fib and will assess. Will get MRI brain and EEG. Will reassess tomorrow. Thank you very much for this consultation.   
 
Mindy Rubi MD 
Neurologist

## 2020-03-15 NOTE — PROGRESS NOTES
Pharmacy Dosing of Warfarin Indication: A. fib Goal INR: 2-3 PTA Warfarin Dose: 4 mg on Sun, Wed and 2 mg on all other days Concurrent anticoagulants: none Concurrent antiplatelet: none Major Interacting Medications ? Drugs that may increase INR: cefepime ? Drugs that may decrease INR: Phytonadione 2.5 mg on 3/9 Conditions that may increase/decrease INR (CHF, C. diff, cirrhosis, thyroid disorder, hypoalbuminemia): None Labs: 
Recent Labs  
  03/15/20 
0412 03/14/20 
0222 03/13/20 
0410 INR 2.5* 1.8* 1.5* HGB 9.6*  --  9.3*  
*  --  551* Estimated Creatinine Clearance: 37.8 mL/min (A) (based on SCr of 2.06 mg/dL (H)). Impression/Plan: ? INR therapeutic. Warfarin 2 mg for tonight ? Lovenox bridge discontinued ? INR daily, CBC w/o diff every other day Thank you, Monae Mota PHARMD 
 
[email or web address]/Warfarin%20Dosing%20Protocol. pdf

## 2020-03-16 LAB
ANION GAP SERPL CALC-SCNC: 9 MMOL/L (ref 5–15)
BASOPHILS # BLD: 0.1 K/UL (ref 0–0.1)
BASOPHILS NFR BLD: 1 % (ref 0–1)
BUN SERPL-MCNC: 33 MG/DL (ref 6–20)
BUN/CREAT SERPL: 15 (ref 12–20)
CALCIUM SERPL-MCNC: 9.1 MG/DL (ref 8.5–10.1)
CHLORIDE SERPL-SCNC: 107 MMOL/L (ref 97–108)
CO2 SERPL-SCNC: 23 MMOL/L (ref 21–32)
CREAT SERPL-MCNC: 2.19 MG/DL (ref 0.55–1.02)
DIFFERENTIAL METHOD BLD: ABNORMAL
EOSINOPHIL # BLD: 0 K/UL (ref 0–0.4)
EOSINOPHIL NFR BLD: 0 % (ref 0–7)
ERYTHROCYTE [DISTWIDTH] IN BLOOD BY AUTOMATED COUNT: 16 % (ref 11.5–14.5)
GLUCOSE BLD STRIP.AUTO-MCNC: 181 MG/DL (ref 65–100)
GLUCOSE BLD STRIP.AUTO-MCNC: 208 MG/DL (ref 65–100)
GLUCOSE BLD STRIP.AUTO-MCNC: 236 MG/DL (ref 65–100)
GLUCOSE BLD STRIP.AUTO-MCNC: 252 MG/DL (ref 65–100)
GLUCOSE SERPL-MCNC: 182 MG/DL (ref 65–100)
HCT VFR BLD AUTO: 34.5 % (ref 35–47)
HGB BLD-MCNC: 10.6 G/DL (ref 11.5–16)
IMM GRANULOCYTES # BLD AUTO: 0.2 K/UL (ref 0–0.04)
IMM GRANULOCYTES NFR BLD AUTO: 2 % (ref 0–0.5)
INR PPP: 3.7 (ref 0.9–1.1)
LYMPHOCYTES # BLD: 0.7 K/UL (ref 0.8–3.5)
LYMPHOCYTES NFR BLD: 6 % (ref 12–49)
MCH RBC QN AUTO: 29 PG (ref 26–34)
MCHC RBC AUTO-ENTMCNC: 30.7 G/DL (ref 30–36.5)
MCV RBC AUTO: 94.5 FL (ref 80–99)
MONOCYTES # BLD: 1 K/UL (ref 0–1)
MONOCYTES NFR BLD: 8 % (ref 5–13)
NEUTS SEG # BLD: 10.2 K/UL (ref 1.8–8)
NEUTS SEG NFR BLD: 83 % (ref 32–75)
NRBC # BLD: 0 K/UL (ref 0–0.01)
NRBC BLD-RTO: 0 PER 100 WBC
PLATELET # BLD AUTO: 572 K/UL (ref 150–400)
PMV BLD AUTO: 9.9 FL (ref 8.9–12.9)
POTASSIUM SERPL-SCNC: 3.7 MMOL/L (ref 3.5–5.1)
PROTHROMBIN TIME: 34.7 SEC (ref 9–11.1)
RBC # BLD AUTO: 3.65 M/UL (ref 3.8–5.2)
RBC MORPH BLD: ABNORMAL
SERVICE CMNT-IMP: ABNORMAL
SODIUM SERPL-SCNC: 139 MMOL/L (ref 136–145)
WBC # BLD AUTO: 12.2 K/UL (ref 3.6–11)

## 2020-03-16 PROCEDURE — 77030038269 HC DRN EXT URIN PURWCK BARD -A

## 2020-03-16 PROCEDURE — 97162 PT EVAL MOD COMPLEX 30 MIN: CPT

## 2020-03-16 PROCEDURE — 74011250636 HC RX REV CODE- 250/636: Performed by: INTERNAL MEDICINE

## 2020-03-16 PROCEDURE — 65660000000 HC RM CCU STEPDOWN

## 2020-03-16 PROCEDURE — 80048 BASIC METABOLIC PNL TOTAL CA: CPT

## 2020-03-16 PROCEDURE — 85025 COMPLETE CBC W/AUTO DIFF WBC: CPT

## 2020-03-16 PROCEDURE — 82962 GLUCOSE BLOOD TEST: CPT

## 2020-03-16 PROCEDURE — 36415 COLL VENOUS BLD VENIPUNCTURE: CPT

## 2020-03-16 PROCEDURE — 85610 PROTHROMBIN TIME: CPT

## 2020-03-16 PROCEDURE — 74011250637 HC RX REV CODE- 250/637: Performed by: PODIATRIST

## 2020-03-16 PROCEDURE — 74011250637 HC RX REV CODE- 250/637: Performed by: INTERNAL MEDICINE

## 2020-03-16 PROCEDURE — 74011636637 HC RX REV CODE- 636/637: Performed by: PODIATRIST

## 2020-03-16 PROCEDURE — 97530 THERAPEUTIC ACTIVITIES: CPT

## 2020-03-16 RX ORDER — POTASSIUM CHLORIDE 750 MG/1
20 TABLET, FILM COATED, EXTENDED RELEASE ORAL
Status: COMPLETED | OUTPATIENT
Start: 2020-03-16 | End: 2020-03-16

## 2020-03-16 RX ADMIN — METOPROLOL SUCCINATE 100 MG: 50 TABLET, EXTENDED RELEASE ORAL at 10:04

## 2020-03-16 RX ADMIN — METRONIDAZOLE 500 MG: 500 INJECTION, SOLUTION INTRAVENOUS at 10:06

## 2020-03-16 RX ADMIN — INSULIN LISPRO 3 UNITS: 100 INJECTION, SOLUTION INTRAVENOUS; SUBCUTANEOUS at 07:30

## 2020-03-16 RX ADMIN — SERTRALINE HYDROCHLORIDE 50 MG: 50 TABLET ORAL at 10:05

## 2020-03-16 RX ADMIN — BUSPIRONE HYDROCHLORIDE 10 MG: 10 TABLET ORAL at 10:05

## 2020-03-16 RX ADMIN — INSULIN LISPRO 3 UNITS: 100 INJECTION, SOLUTION INTRAVENOUS; SUBCUTANEOUS at 12:11

## 2020-03-16 RX ADMIN — DOXAZOSIN 4 MG: 2 TABLET ORAL at 10:05

## 2020-03-16 RX ADMIN — HYDRALAZINE HYDROCHLORIDE 100 MG: 50 TABLET, FILM COATED ORAL at 21:20

## 2020-03-16 RX ADMIN — SODIUM BICARBONATE 650 MG: 650 TABLET ORAL at 10:05

## 2020-03-16 RX ADMIN — DILTIAZEM HYDROCHLORIDE 60 MG: 60 TABLET, FILM COATED ORAL at 17:27

## 2020-03-16 RX ADMIN — HYDRALAZINE HYDROCHLORIDE 100 MG: 50 TABLET, FILM COATED ORAL at 05:54

## 2020-03-16 RX ADMIN — POTASSIUM CHLORIDE 20 MEQ: 750 TABLET, FILM COATED, EXTENDED RELEASE ORAL at 13:53

## 2020-03-16 RX ADMIN — HYDRALAZINE HYDROCHLORIDE 100 MG: 50 TABLET, FILM COATED ORAL at 13:53

## 2020-03-16 RX ADMIN — CYANOCOBALAMIN TAB 500 MCG 1000 MCG: 500 TAB at 10:05

## 2020-03-16 RX ADMIN — POTASSIUM CHLORIDE 10 MEQ: 750 TABLET, FILM COATED, EXTENDED RELEASE ORAL at 10:04

## 2020-03-16 RX ADMIN — METRONIDAZOLE 500 MG: 500 INJECTION, SOLUTION INTRAVENOUS at 21:18

## 2020-03-16 RX ADMIN — INSULIN LISPRO 5 UNITS: 100 INJECTION, SOLUTION INTRAVENOUS; SUBCUTANEOUS at 17:28

## 2020-03-16 RX ADMIN — DILTIAZEM HYDROCHLORIDE 60 MG: 60 TABLET, FILM COATED ORAL at 10:04

## 2020-03-16 RX ADMIN — Medication 10 ML: at 13:53

## 2020-03-16 RX ADMIN — MELATONIN 5 TABLET: at 10:05

## 2020-03-16 RX ADMIN — Medication 10 ML: at 21:18

## 2020-03-16 RX ADMIN — SODIUM BICARBONATE 650 MG: 650 TABLET ORAL at 21:20

## 2020-03-16 RX ADMIN — HYOSCYAMINE SULFATE: 16 SOLUTION at 10:12

## 2020-03-16 RX ADMIN — Medication 1 AMPULE: at 09:00

## 2020-03-16 RX ADMIN — SODIUM BICARBONATE 650 MG: 650 TABLET ORAL at 17:27

## 2020-03-16 RX ADMIN — DILTIAZEM HYDROCHLORIDE 60 MG: 60 TABLET, FILM COATED ORAL at 13:53

## 2020-03-16 RX ADMIN — Medication 10 ML: at 05:53

## 2020-03-16 NOTE — DIABETES MGMT
[address] INITIAL NOTE Presentation This 59 yo WF was admitted 3/8/20 from the ER with complaints of left leg pain, swelling and redness. Also noted to be in afib with RVR. DX: Cellulitis. Treated with ABx. Afebrile. Local treatment. Awaiting surgical procedure. Diabetes: Patient with known Type 2 diabetes, treated with basal/bolus insulin strategy PTA. A1c 7.5%. Subjective I had leg pain.  Objective Physical exam 
General Alert, oriented and in no acute distress. Conversant and cooperative. Lying in bed. Patient reports that she is not eating much because she doesn't like the food. Vital Signs Visit Vitals /54 (BP Patient Position: Supine) Pulse 78 Temp 97.6 °F (36.4 °C) Resp 18 Ht 5' 9\" (1.753 m) Wt 107 kg (235 lb 14.4 oz) SpO2 100% BMI 34.84 kg/m² Laboratory Lab Results Component Value Date/Time Hemoglobin A1c 7.5 (H) 03/08/2020 07:09 PM  
 Hemoglobin A1c (POC) 7.2 07/10/2019 03:14 PM  
 
Lab Results Component Value Date/Time LDL, calculated 82 11/11/2019 08:47 AM  
 
Lab Results Component Value Date/Time Creatinine (POC) 1.6 (H) 09/09/2011 08:34 AM  
 Creatinine 2.19 (H) 03/16/2020 12:53 AM  
 
Lab Results Component Value Date/Time Sodium 139 03/16/2020 12:53 AM  
 Potassium 3.7 03/16/2020 12:53 AM  
 Chloride 107 03/16/2020 12:53 AM  
 CO2 23 03/16/2020 12:53 AM  
 Anion gap 9 03/16/2020 12:53 AM  
 Glucose 182 (H) 03/16/2020 12:53 AM  
 BUN 33 (H) 03/16/2020 12:53 AM  
 Creatinine 2.19 (H) 03/16/2020 12:53 AM  
 BUN/Creatinine ratio 15 03/16/2020 12:53 AM  
 GFR est AA 28 (L) 03/16/2020 12:53 AM  
 GFR est non-AA 23 (L) 03/16/2020 12:53 AM  
 Calcium 9.1 03/16/2020 12:53 AM  
 Bilirubin, total 0.5 03/08/2020 11:25 AM  
 AST (SGOT) 22 03/08/2020 11:25 AM  
 Alk.  phosphatase 100 03/08/2020 11:25 AM  
 Protein, total 6.2 03/10/2020 01:58 AM  
 Albumin 2.8 (L) 03/08/2020 11:25 AM  
 Globulin 4.7 (H) 03/08/2020 11:25 AM  
 A-G Ratio 0.6 (L) 03/08/2020 11:25 AM  
 ALT (SGPT) 16 03/08/2020 11:25 AM  
 
Lab Results Component Value Date/Time ALT (SGPT) 16 03/08/2020 11:25 AM  
 
 
Blood glucose pattern BGs managed with home diabetes regimen from 3/9/20 through 3/13/20. When taken for operative procedure, had BGs in the 80s. Catatonic state. Thought to be related to ABx (Cepamine) reaction. ABx switched to Levaquin IV Q 48 hours and Flagyl Q12 hours. Assessment and Plan Nursing Diagnosis Risk for unstable blood glucose pattern Nursing Intervention Domain 7576 Decision-making Support Nursing Interventions Examined current inpatient diabetes control Explored factors facilitating and impeding inpatient management Evaluation This woman, with known Type 2 diabetes, had achieved inpatient blood glucose target of 100-180mg/dl on home regimen until event on 3/13/20. Did not receive insulin on 3/14/20. Corrective insulin required on 3/15/20. Now BGs in the 200s. Would restart home diabetes regimen. Recommendations Recommend: 
Basal insulin  
[x] 0.2 units/kg/D = Lantus 20 units D Corrective insulin 
[x] Normal sensitivity Billing Code(s) [x] [id number] IP subsequent hospital care - 30 minutes Amelie Michael, DARRYL Access via [facility] [phone]

## 2020-03-16 NOTE — PROGRESS NOTES
NEUROLOGY NOTE Chief Complaint Patient presents with  Leg Swelling  
  leg pain, swelling, and redness x 24 hours; reports that prior to that she had been experiencing fever and chills; hx of DM and cellulitis, but to other leg SUBJECTIVE: 
Pt is back to her baseline EEG done and result is pending HPI The patient is a 79-year-old woman with initially presented on 3/8/2020 as she was having fever and chills and yesterday she saw that her left leg was swollen and red. She was also in A. fib with RVR with heart rate more than 150 she was found to have left leg cellulitis. She was started on vancomycin and cefepime. The patient did have infected diabetic wound. The patient does also have CKD 3 likely from diabetes nephropathy. Patient did have left foot debridement and bone biopsy from the left fifth metatarsal on 3/10/2020. The work-up showed no evidence of osteomyelitis the exposed bone is high risk for osteomyelitis and the skin substitute graft needs to be placed to cover the bone. On 2/4/2020, the patient was having altered mental status. The patient surgery was delayed. The patient's blood sugar was 86 which improved to 98 and there was some improvement in mental status. The patient is still confused and oriented x1. Patient's blood pressure was 184/82 at around 9 AM. Patient did have a CT scan of the head which showed moderate small vessel ischemic change. ROS A ten system review of constitutional, cardiovascular, respiratory, musculoskeletal, endocrine, skin, SHEENT, genitourinary, psychiatric and neurologic systems was obtained and is unremarkable except as stated in HPI  
 
PMH Past Medical History:  
Diagnosis Date  Acquired lymphedema Right arm  Arrhythmia  Asthma  [facility] ([facility] Dr[facility] Shaun [facility]  Cancer ([facility] bilateral breast  
 Chronic kidney disease  Diabetes mellitus type II   
  Dizziness  Essential hypertension  Hyperlipidemia  Hypertension  Long term (current) use of anticoagulants  Morbid obesity ([facility] 3/14/2012  Nausea & vomiting  Osteoarthritis  Pacemaker  Sick sinus syndrome ([facility] Family History Problem Relation Age of Onset  Cancer Mother  Heart Disease Mother  Alcohol abuse Father  Diabetes Brother  Hypertension Brother [address] Social History Socioeconomic History  Marital status:  Spouse name: Not on file  Number of children: Not on file  Years of education: Not on file  Highest education level: Not on file Tobacco Use  Smoking status: Never Smoker  Smokeless tobacco: Never Used Substance and Sexual Activity  Alcohol use: Yes Comment: rare  Drug use: No  
 
 
ALLERGIES Allergies Allergen Reactions  Ace Inhibitors Cough  Amlodipine Swelling  Avapro [Irbesartan] Unable to Obtain  Bactrim [Sulfamethoxazole-Trimethoprim] Other (comments) Sore mouth  Captopril Cough  Carvedilol Diarrhea [address]  Morphine Nausea and Vomiting and Swelling  Penicillins Hives Tolerated cefepime 1/2020  Pravachol [Pravastatin] Nausea Only  Seafood [Shellfish Containing Products] Hives  Singulair [Montelukast] Other (comments)  
  palpitations PHYSICAL EXAMINATION:  
Patient Vitals for the past 24 hrs: 
 Temp Pulse Resp BP SpO2  
03/16/20 0714 98 °F (36.7 °C) 96 18 132/58 100 % 03/16/20 0247 97.9 °F (36.6 °C) 89 18 145/63 100 % 03/15/20 2355 97.6 °F (36.4 °C) 85 18 132/65 100 % 03/15/20 1904 98.3 °F (36.8 °C) 76 18 141/52 99 % 03/15/20 1525 97.3 °F (36.3 °C) 85 18 124/57 100 % 03/15/20 1101 97.7 °F (36.5 °C) 77 16 125/55 99 % General:  
General appearance: Pt is in no acute distress Distal pulses are preserved Neurological Examination: Mental Status:  Awake O x3. Speech is fluent. Follows commands, has normal fund of knowledge, attention, short term recall, comprehension and insight. Cranial Nerves: Visual fields are full. PERRL, Extraocular movements are full. Facial sensation intact. Facial movement intact. Hearing intact to conversation. Palate elevates symmetrically. Shoulder shrug symmetric. Tongue midline. Motor: Strength is 5/5 in all 4 ext. Normal tone. No atrophy. Sensation: Light touch - Normal 
 
Reflexes: DTRs 1+ in UE and absent in LE Skin: No significant bruising or lacerations. LAB DATA REVIEWED:   
Recent Results (from the past 24 hour(s)) GLUCOSE, POC Collection Time: 03/15/20 11:03 AM  
Result Value Ref Range Glucose (POC) 145 (H) 65 - 100 mg/dL Performed by Thad Krueger, POC Collection Time: 03/15/20  4:27 PM  
Result Value Ref Range Glucose (POC) 149 (H) 65 - 100 mg/dL Performed by Thad Krueger, POC Collection Time: 03/15/20  8:58 PM  
Result Value Ref Range Glucose (POC) 159 (H) 65 - 100 mg/dL Performed by Sherren Aloe RN   
GLUCOSE, POC Collection Time: 03/15/20  9:14 PM  
Result Value Ref Range Glucose (POC) 172 (H) 65 - 100 mg/dL Performed by Caroline Crouch METABOLIC PANEL, BASIC Collection Time: 03/16/20 12:53 AM  
Result Value Ref Range Sodium 139 136 - 145 mmol/L Potassium 3.7 3.5 - 5.1 mmol/L Chloride 107 97 - 108 mmol/L  
 CO2 23 21 - 32 mmol/L Anion gap 9 5 - 15 mmol/L Glucose 182 (H) 65 - 100 mg/dL BUN 33 (H) 6 - 20 MG/DL Creatinine 2.19 (H) 0.55 - 1.02 MG/DL  
 BUN/Creatinine ratio 15 12 - 20 GFR est AA 28 (L) >60 ml/min/1.73m2 GFR est non-AA 23 (L) >60 ml/min/1.73m2 Calcium 9.1 8.5 - 10.1 MG/DL PROTHROMBIN TIME + INR Collection Time: 03/16/20 12:53 AM  
Result Value Ref Range INR 3.7 (H) 0.9 - 1.1 Prothrombin time 34.7 (H) 9.0 - 11.1 sec CBC WITH AUTOMATED DIFF  
 Collection Time: 03/16/20 12:53 AM  
Result Value Ref Range WBC 12.2 (H) 3.6 - 11.0 K/uL  
 RBC 3.65 (L) 3.80 - 5.20 M/uL  
 HGB 10.6 (L) 11.5 - 16.0 g/dL HCT 34.5 (L) 35.0 - 47.0 % MCV 94.5 80.0 - 99.0 FL  
 MCH 29.0 26.0 - 34.0 PG  
 MCHC 30.7 30.0 - 36.5 g/dL  
 RDW 16.0 (H) 11.5 - 14.5 % PLATELET 773 (H) 883 - 400 K/uL MPV 9.9 8.9 - 12.9 FL  
 NRBC 0.0 0  WBC ABSOLUTE NRBC 0.00 0.00 - 0.01 K/uL NEUTROPHILS 83 (H) 32 - 75 % LYMPHOCYTES 6 (L) 12 - 49 % MONOCYTES 8 5 - 13 % EOSINOPHILS 0 0 - 7 % BASOPHILS 1 0 - 1 % IMMATURE GRANULOCYTES 2 (H) 0.0 - 0.5 % ABS. NEUTROPHILS 10.2 (H) 1.8 - 8.0 K/UL  
 ABS. LYMPHOCYTES 0.7 (L) 0.8 - 3.5 K/UL  
 ABS. MONOCYTES 1.0 0.0 - 1.0 K/UL  
 ABS. EOSINOPHILS 0.0 0.0 - 0.4 K/UL  
 ABS. BASOPHILS 0.1 0.0 - 0.1 K/UL  
 ABS. IMM. GRANS. 0.2 (H) 0.00 - 0.04 K/UL  
 DF SMEAR SCANNED    
 RBC COMMENTS NORMOCYTIC, NORMOCHROMIC    
GLUCOSE, POC Collection Time: 03/16/20  7:13 AM  
Result Value Ref Range Glucose (POC) 236 (H) 65 - 100 mg/dL Performed by Cezar Charles Imaging review: 
CT Head Normal 
 
HOME MEDS Prior to Admission Medications Prescriptions Last Dose Informant Patient Reported? Taking?  
acetaminophen 500 mg tab 500 mg, diphenhydrAMINE 25 mg cap 25 mg   Yes No  
Sig: Take 2 Doses by mouth nightly. bumetanide (BUMEX) 1 mg tablet  Self Yes No  
Sig: Take 2 mg by mouth daily. busPIRone (BUSPAR) 10 mg tablet  Self No No  
Sig: TAKE ONE TABLET BY MOUTH TWICE A DAY cholecalciferol, VITAMIN D3, (VITAMIN D3) 5,000 unit tab tablet  Self Yes No  
Sig: Take 5,000 Units by mouth daily. cloNIDine HCl (CATAPRES) 0.3 mg tablet  Self Yes No  
Sig: Take 0.6 mg by mouth nightly. cyanocobalamin (VITAMIN B-12) 1,000 mcg sublingual tablet  Self Yes No  
Sig: Take 1,000 mcg by mouth daily. doxazosin (CARDURA) 4 mg tablet  Self No No  
Sig: TAKE ONE TABLET BY MOUTH ONCE NIGHTLY hydrALAZINE (APRESOLINE) 100 mg tablet   No No  
Sig: TAKE ONE TABLET BY MOUTH THREE TIMES A DAY  
insulin glargine (LANTUS U-100 INSULIN) 100 unit/mL injection   No No  
Sig: Inject 20 units daily  
insulin lispro (HUMALOG) 100 unit/mL kwikpen  Self Yes No  
Si units with dinner for sugars over 200  
metolazone (ZAROXOLYN) 5 mg tablet  Self No No  
Sig: Take 1 Tab by mouth daily as needed (take if develop increased LE edema, weight gain > 5 pounds. ). metoprolol succinate (TOPROL-XL) 100 mg tablet  Self No No  
Sig: TAKE TWO TABLETS BY MOUTH DAILY potassium chloride SR (KLOR-CON 10) 10 mEq tablet   Yes No  
Sig: Take 10 mEq by mouth daily. Indications: Patient states she intends to take this medication untl she discusses with Dr. Shira Avina on 19.  
sertraline (ZOLOFT) 50 mg tablet   No No  
Sig: TAKE ONE AND ONE-HALF (1 & 1/2) TABLET BY MOUTH DAILY  
vitamin A 8,000 unit capsule  Self Yes No  
Sig: Take 8,000 Units by mouth daily. warfarin (COUMADIN) 4 mg tablet 3/5/2020  Yes Yes Sig: Take 2 mg by mouth five (5) days a week. Take 1 tablet (4 mg) on Sun and Wed and a half tablet (2 mg) the other 5 days. warfarin (COUMADIN) 4 mg tablet 3/4/2020  Yes Yes Sig: Take 4 mg by mouth two (2) times a week. Take 1 tablet (4 mg) on Sun and Wed and a half tablet (2 mg) the other 5 days. Facility-Administered Medications: None CURRENT MEDS Current Facility-Administered Medications Medication Dose Route Frequency  levoFLOXacin (LEVAQUIN) 750 mg in D5W IVPB  750 mg IntraVENous Q48H  
 metroNIDAZOLE (FLAGYL) IVPB premix 500 mg  500 mg IntraVENous Q12H  
 alcohol 62% (NOZIN) nasal  1 Ampule  1 Ampule Topical Q12H  
 epoetin viet-epbx (RETACRIT) 12,000 Units combo injection  12,000 Units SubCUTAneous Q MON, WED & FRI  
 docusate sodium (COLACE) capsule 100 mg  100 mg Oral BID  sodium hypochlorite (HALF STRENGTH DAKIN'S) 0.25% irrigation (bottle)   Topical DAILY  dilTIAZem (CARDIZEM) IR tablet 60 mg  60 mg Oral TIDAC  WARFARIN INFORMATION NOTE (COUMADIN)   Other QPM  
 sodium bicarbonate tablet 650 mg  650 mg Oral TID  busPIRone (BUSPAR) tablet 10 mg  10 mg Oral DAILY  cholecalciferol (VITAMIN D3) (1000 Units /25 mcg) tablet 5 Tab  5,000 Units Oral DAILY  cyanocobalamin (VITAMIN B12) tablet 1,000 mcg  1,000 mcg Oral DAILY  doxazosin (CARDURA) tablet 4 mg  4 mg Oral DAILY  hydrALAZINE (APRESOLINE) tablet 100 mg  100 mg Oral Q8H  
 metoprolol succinate (TOPROL-XL) XL tablet 100 mg  100 mg Oral DAILY  potassium chloride SR (KLOR-CON 10) tablet 10 mEq  10 mEq Oral DAILY  sertraline (ZOLOFT) tablet 50 mg  50 mg Oral DAILY  sodium chloride (NS) flush 5-40 mL  5-40 mL IntraVENous Q8H  
 insulin lispro (HUMALOG) injection   SubCUTAneous AC&HS IMPRESSION/RECOMMENDATIONS: 
Peggy Duke is a 60 y.o. female who presents with A fib and cellulitis. Negative for osteomyelitis. Pt was supposed to get a skin graft but did have alt mental status. BS is in normal range. Non focal Exam. Pt is back to her baseline today. Suspect this to be metabolic ? cefepime related. EEG was done today and result is pending. MRI brain is not indicated at this time. Call with questions.   
 
 
Stiven Larson MD 
Neurologist

## 2020-03-16 NOTE — PROGRESS NOTES
Podiatry Subjective: Pt awake in bed, no new complaints. Patient is a 60 y.o.  female who is being seen for ulcer left foot. Patient Active Problem List  
 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 ([address] 03/03/2020  Traumatic hematoma of foot, left, initial encounter 02/25/2020  Type 2 diabetes mellitus with left diabetic foot infection ([address] 10/29/2019  Foot deformity, acquired, left 09/24/2019  Foot deformity, right 09/24/2019  Pes cavus 09/24/2019  Cellulitis of right lower extremity 08/09/2019  Diabetic ulcer of right midfoot associated with type 2 diabetes mellitus, with fat layer exposed ([address] 08/06/2019  Diabetic ulcer of left heel associated with type 2 diabetes mellitus, with fat layer exposed ([address] 06/21/2019  Open breast wound, left, initial encounter 06/07/2019  Venous stasis ulcer of right lower leg with edema of right lower leg (HCC) 11/14/2018  Diabetic polyneuropathy associated with type 2 diabetes mellitus ([address] 11/13/2018  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, associated with type 2 diabetes mellitus ([address] 04/25/2018  Morbid obesity with BMI of 40.0-44.9, adult ([address] 05/01/2017  Controlled type 2 diabetes mellitus with stage 4 chronic kidney disease, with long-term current use of insulin ([address] 01/16/2017  PAF (paroxysmal atrial fibrillation) ([address] 11/28/2016  Anemia in stage 4 chronic kidney disease ([address] 11/28/2016  Essential hypertension 08/26/2016  Systolic murmur 31/68/9732  CKD (chronic kidney disease), stage IV ([address] 06/09/2012  Mixed hyperlipidemia 05/22/2012  Long term (current) use of anticoagulants 04/11/2012  S/P cardiac pacemaker procedure 04/03/2012  Sick sinus syndrome ([facility] 04/02/2012  Status post ablation of atrial fibrillation 12/15/2011  Fe deficiency anemia 04/14/2010  
 History of breast cancer 04/13/2010  Asthma 04/13/2010 Past Medical History:  
Diagnosis Date  Acquired lymphedema Right arm  Arrhythmia  Asthma  Atrial fibrillation ([facility] Dr. Floria Boas and Dr. Gil alves [facility])  Cancer ([facility] bilateral breast  
 Chronic kidney disease  Diabetes mellitus type II   
 Dizziness  Essential hypertension  Hyperlipidemia  Hypertension  Long term (current) use of anticoagulants  Morbid obesity ([facility] 3/14/2012  Nausea & vomiting  Osteoarthritis  Pacemaker  Sick sinus syndrome ([facility] Past Surgical History:  
Procedure Laterality Date  ABDOMEN SURGERY PROC UNLISTED    
 gastric bypass  GASTRIC BYPASS,OBESE<150CM SAW[facility][id number]  7/08  
 [facility]  HX AFIB ABLATION    
 HX CARPAL TUNNEL RELEASE [address] [id number] RIGHT MODIFIED RADICAL   
 HX MOHS PROCEDURES  1995  
 right  HX ORTHOPAEDIC Right   
 knee surgery  HX PACEMAKER    
 IR INSERT TUNL CVC W PORT OVER 5 YEARS    
 LAMINECTOMY,CERVICAL  1994  
 NEEDLE BIOPSY LIVER,W OTHR [address]  8.21.07  
 PA [address] AND 1994  PA TRABECULOPLASTY BY LASER SURGERY    
 US GUIDE ASP OVARIAN CYST ABD APPROACH  8.21.07  
 RIGHT Family History Problem Relation Age of Onset  Cancer Mother  Heart Disease Mother  Alcohol abuse Father  Diabetes Brother  Hypertension Brother Social History Tobacco Use  Smoking status: Never Smoker  Smokeless tobacco: Never Used Substance Use Topics  Alcohol use: Yes Comment: rare Allergies Allergen Reactions  Ace Inhibitors Cough  Amlodipine Swelling  Avapro [Irbesartan] Unable to Obtain  Bactrim [Sulfamethoxazole-Trimethoprim] Other (comments) Sore mouth  Captopril Cough  Carvedilol Diarrhea [address]  Morphine Nausea and Vomiting and Swelling  Penicillins Hives Tolerated cefepime 1/2020  Pravachol [Pravastatin] Nausea Only  Seafood [Shellfish Containing Products] Hives  Singulair [Montelukast] Other (comments)  
  palpitations Prior to Admission medications Medication Sig Start Date End Date Taking? Authorizing Provider  
warfarin (COUMADIN) 4 mg tablet Take 2 mg by mouth five (5) days a week. Take 1 tablet (4 mg) on Sun and Wed and a half tablet (2 mg) the other 5 days. Yes Provider, Historical  
warfarin (COUMADIN) 4 mg tablet Take 4 mg by mouth two (2) times a week. Take 1 tablet (4 mg) on Sun and Wed and a half tablet (2 mg) the other 5 days. Yes Provider, Historical  
acetaminophen 500 mg tab 500 mg, diphenhydrAMINE 25 mg cap 25 mg Take 2 Doses by mouth nightly. Provider, Historical  
potassium chloride SR (KLOR-CON 10) 10 mEq tablet Take 10 mEq by mouth daily. Indications: Patient states she intends to take this medication untl she discusses with Dr. Lakesha Culver on 12-12-19. Provider, Historical  
insulin glargine (LANTUS U-100 INSULIN) 100 unit/mL injection Inject 20 units daily 11/10/19   Mera Hansen MD  
hydrALAZINE (APRESOLINE) 100 mg tablet TAKE ONE TABLET BY MOUTH THREE TIMES A DAY 10/21/19   Mera Hansen MD  
sertraline (ZOLOFT) 50 mg tablet TAKE ONE AND ONE-HALF (1 & 1/2) TABLET BY MOUTH DAILY 8/22/19   Mera Hansen MD  
bumetanide (BUMEX) 1 mg tablet Take 2 mg by mouth daily. Provider, Historical  
cloNIDine HCl (CATAPRES) 0.3 mg tablet Take 0.6 mg by mouth nightly.     Provider, Historical  
metoprolol succinate (TOPROL-XL) 100 mg tablet TAKE TWO TABLETS BY MOUTH DAILY 7/12/19   Mera Hansen MD  
 doxazosin (CARDURA) 4 mg tablet TAKE ONE TABLET BY MOUTH ONCE NIGHTLY 19   Duncan Crane MD  
busPIRone (BUSPAR) 10 mg tablet TAKE ONE TABLET BY MOUTH TWICE A DAY 19   Duncan Crane MD  
metolazone (ZAROXOLYN) 5 mg tablet Take 1 Tab by mouth daily as needed (take if develop increased LE edema, weight gain > 5 pounds. ). 4/10/14   Sloan Hernandez NP  
cholecalciferol, VITAMIN D3, (VITAMIN D3) 5,000 unit tab tablet Take 5,000 Units by mouth daily. Provider, Historical  
vitamin A 8,000 unit capsule Take 8,000 Units by mouth daily. Provider, Historical  
insulin lispro (HUMALOG) 100 unit/mL kwikpen 2 units with dinner for sugars over 200 1/3/14   Duncan Crane MD  
cyanocobalamin (VITAMIN B-12) 1,000 mcg sublingual tablet Take 1,000 mcg by mouth daily. Provider, Historical  
 
 
Review of Systems AO x3. Alert and talkative. Objective:  
 
Patient Vitals for the past 8 hrs: 
 BP Temp Pulse Resp SpO2  
20 1431 109/54 97.6 °F (36.4 °C) 78 18 100 % Temp (24hrs), Av.9 °F (36.6 °C), Min:97.6 °F (36.4 °C), Max:98.3 °F (36.8 °C) Physical Exam Lower Extremity Dressing removed. Wound lateral left foot is dark and dry. Wound plantar left foot is wet with return of purulent drainage. DP and PT palpable bilaterally Sensation intact to touch Data Review:  
Recent Results (from the past 24 hour(s)) GLUCOSE, POC Collection Time: 03/15/20  8:58 PM  
Result Value Ref Range Glucose (POC) 159 (H) 65 - 100 mg/dL Performed by Mike Hammond RN   
GLUCOSE, POC Collection Time: 03/15/20  9:14 PM  
Result Value Ref Range Glucose (POC) 172 (H) 65 - 100 mg/dL Performed by Jason Harry METABOLIC PANEL, BASIC Collection Time: 20 12:53 AM  
Result Value Ref Range Sodium 139 136 - 145 mmol/L Potassium 3.7 3.5 - 5.1 mmol/L Chloride 107 97 - 108 mmol/L  
 CO2 23 21 - 32 mmol/L  Anion gap 9 5 - 15 mmol/L  
 Glucose 182 (H) 65 - 100 mg/dL BUN 33 (H) 6 - 20 MG/DL Creatinine 2.19 (H) 0.55 - 1.02 MG/DL  
 BUN/Creatinine ratio 15 12 - 20 GFR est AA 28 (L) >60 ml/min/1.73m2 GFR est non-AA 23 (L) >60 ml/min/1.73m2 Calcium 9.1 8.5 - 10.1 MG/DL PROTHROMBIN TIME + INR Collection Time: 03/16/20 12:53 AM  
Result Value Ref Range INR 3.7 (H) 0.9 - 1.1 Prothrombin time 34.7 (H) 9.0 - 11.1 sec CBC WITH AUTOMATED DIFF Collection Time: 03/16/20 12:53 AM  
Result Value Ref Range WBC 12.2 (H) 3.6 - 11.0 K/uL  
 RBC 3.65 (L) 3.80 - 5.20 M/uL  
 HGB 10.6 (L) 11.5 - 16.0 g/dL HCT 34.5 (L) 35.0 - 47.0 % MCV 94.5 80.0 - 99.0 FL  
 MCH 29.0 26.0 - 34.0 PG  
 MCHC 30.7 30.0 - 36.5 g/dL  
 RDW 16.0 (H) 11.5 - 14.5 % PLATELET 941 (H) 718 - 400 K/uL MPV 9.9 8.9 - 12.9 FL  
 NRBC 0.0 0  WBC ABSOLUTE NRBC 0.00 0.00 - 0.01 K/uL NEUTROPHILS 83 (H) 32 - 75 % LYMPHOCYTES 6 (L) 12 - 49 % MONOCYTES 8 5 - 13 % EOSINOPHILS 0 0 - 7 % BASOPHILS 1 0 - 1 % IMMATURE GRANULOCYTES 2 (H) 0.0 - 0.5 % ABS. NEUTROPHILS 10.2 (H) 1.8 - 8.0 K/UL  
 ABS. LYMPHOCYTES 0.7 (L) 0.8 - 3.5 K/UL  
 ABS. MONOCYTES 1.0 0.0 - 1.0 K/UL  
 ABS. EOSINOPHILS 0.0 0.0 - 0.4 K/UL  
 ABS. BASOPHILS 0.1 0.0 - 0.1 K/UL  
 ABS. IMM. GRANS. 0.2 (H) 0.00 - 0.04 K/UL  
 DF SMEAR SCANNED    
 RBC COMMENTS NORMOCYTIC, NORMOCHROMIC    
GLUCOSE, POC Collection Time: 03/16/20  7:13 AM  
Result Value Ref Range Glucose (POC) 236 (H) 65 - 100 mg/dL Performed by Cristy Lantigua GLUCOSE, POC Collection Time: 03/16/20 11:24 AM  
Result Value Ref Range Glucose (POC) 208 (H) 65 - 100 mg/dL Performed by Rios Galvez GLUCOSE, POC Collection Time: 03/16/20  4:29 PM  
Result Value Ref Range Glucose (POC) 252 (H) 65 - 100 mg/dL Performed by Cristy Lantigua Impression:  
Diabetic foot infection left Ulcer to bone left foot Recommendation: The AMS has been resolved by the Primary Care Team and Neurology. I recommended the skin substitute graft along with debridement of the wound left foot as previously planned. The patient agrees, but she is super therapeutic with her INR of 3/7again. I would like to have her INR closer to 2 before proceeding with Sx, but Sx can be done in the near future.

## 2020-03-16 NOTE — PROGRESS NOTES
Hospitalist Progress Note NAME: Odette Abrams :  1960 MRN:  [id number] Assessment / Plan: 
Acute Encephalopathy not POA- noted today in preop holding 3/14- now resolved Suspect metabolic due to hypoglycemia versus TIA versus Psychiatric event B12= 1403 FA= 85 Ammonia = 25 Stat CT head = Moderate small vessel ischemic changes. Chronic sinus disease. No 
acute intracranial abnormality Examination non focal 
FS 86--> 98 , s/p 1/2 amp D50 in preop holding-- some improvement in mental status but pt still confused- oriented x 1 (person) IP neurology consult appreciated MRI cancelled today as pt back to her normal self now & has PPM 
EEG per neurology if still indicated Off Cefepime now- pt has improved significantly now Cont to hold off Lantus for now & will allow to eat diet so her FS remain up in the mean time. INR therapeutic now (3.7) Left leg cellulitis and ulceration of the left foot 
- Continue with empiric Vancomycin + Levaquin (renlly dosed) - Blood cultures with no growth to date - CT leg noted - Venous US LLE negative for DVT Wound Cx= growing MSSA & sensitive Proteus 
 
- Pain control prn - Elevated limb as needed  
- POD#6 Debridement left foot wound and bone biopsy . Seen by Dr Héctor Infante yesterday- was planned for Graft surgery saturday but pt had new AMS in preop holding 3/14-- procedure cancelled -- ? NPO for it today now- await Dr Kelsey Barajas follow up today NWB on L foot noted 
?need for wound vacc indicated 
 
  
  
DM- was hypoglycemic due to poor PO intake noted- FS now running high off lantus Had decreased Lantus to half at 10 units daily 3/13--resume today Holding scheduled Novolog 5 units with meals for now Cont SSI lispro as needed 
  
Atrial fibrillation/RVR 
- s/p Cardizem bolus and remains on Cardizem gtt with good rate control -- now transitioning to oral diltiazem 
- Continue metoprolol - Continue coumadin; pharmacy consult for dosing, DC bridging lovenox today as INR therapeutic 
- supra therapeutic resolved initially  INR 3.7 today - Cardiology consult reviewed and appreciated  
 
Elevated troponin - Likely due to demand with rapid atrial fibrillation - Downtrending  
  
CKD4- Cr stable at ~ 2.0 
- Appears to be stable at baseline creatinine - Renally dose medications  
- sodium bicarbonate 650 mg bid - BUMEX 1 mg BID per nephrology - Give IV iron - Epogen - Nephrology on board. Greatly appreciate input  
  
HTN 
- Continue home medications: hydralazine,Toprol, Cardura , cardizem  
- hold clonidine and  Metolazone HLD 
  
Anxiety  
- Continue Zoloft, Buspar 
  
CHF 
- No exacerbation - On  Bumex  
  
SSS 
- Pacemaker Morbid obesity due to BMI >35 with DM, HTN / Body mass index is 34.84 kg/m². Code status: Full Prophylaxis: anticoagulated with warfarin Recommended Disposition: Home w/Family versus HH/SNF TBD post operatively on monday Subjective: Chief Complaint / Reason for Physician Visit: follow up rapid atrial fibrillation and cellulitis, foot ulcers, AMS now resolved \"I am ok\" Review of Systems: 
Symptom Y/N Comments  Symptom Y/N Comments Fever/Chills n   Chest Pain n   
Poor Appetite n   Edema y Cough n   Abdominal Pain n   
Sputum n   Joint Pain SOB/CHOW n   Pruritis/Rash Nausea/vomit n   Tolerating PT/OT Diarrhea    Tolerating Diet Constipation    Other Could NOT obtain due to:   
 
Objective: VITALS:  
Last 24hrs VS reviewed since prior progress note. Most recent are: 
Patient Vitals for the past 24 hrs: 
 Temp Pulse Resp BP SpO2  
03/16/20 0714 98 °F (36.7 °C) 96 18 132/58 100 % 03/16/20 0247 97.9 °F (36.6 °C) 89 18 145/63 100 % 03/15/20 2355 97.6 °F (36.4 °C) 85 18 132/65 100 % 03/15/20 1904 98.3 °F (36.8 °C) 76 18 141/52 99 % 03/15/20 1525 97.3 °F (36.3 °C) 85 18 124/57 100 % 03/15/20 1101 97.7 °F (36.5 °C) 77 16 125/55 99 % Intake/Output Summary (Last 24 hours) at 3/16/2020 1038 Last data filed at 3/16/2020 4734 Gross per 24 hour Intake 200 ml Output 700 ml Net -500 ml PHYSICAL EXAM: 
General: WD, WN. Alert, cooperative, no acute distress   
EENT:  EOMI. Anicteric sclerae. MMM Resp:  CTA bilaterally, no wheezing or rales. No accessory muscle use CV:  Irregularly irregular  rhythm, +LLE edema 1-2+ GI:  Soft, Non distended, Non tender.  +Bowel sounds Neurologic:  Alert and oriented X3 today (back to baseline MS today) Psych:   Good insight. Not anxious nor agitated Skin:  No jaundice. Erythema of distal left lower extremity and foot, with heat and tenderness. Reviewed most current lab test results and cultures  YES Reviewed most current radiology test results   YES Review and summation of old records today    NO Reviewed patient's current orders and MAR    YES 
PMH/ reviewed - no change compared to H&P 
________________________________________________________________________ Care Plan discussed with: 
  Comments Patient x Family RN x Care Manager x Consultant     
                 x Multidiciplinary team rounds were held today with , nursing, pharmacist and clinical coordinator. Patient's plan of care was discussed; medications were reviewed and discharge planning was addressed. ________________________________________________________________________ Total NON critical care TIME:  26   Minutes Total CRITICAL CARE TIME Spent:   Minutes non procedure based Comments >50% of visit spent in counseling and coordination of care    
________________________________________________________________________ Hope MD Villa  
 
Procedures: see electronic medical records for all procedures/Xrays and details which were not copied into this note but were reviewed prior to creation of Plan.    
 
LABS: 
 I reviewed today's most current labs and imaging studies. Pertinent labs include: 
Recent Labs  
  03/16/20 
0053 03/15/20 
2040 WBC 12.2* 10.6 HGB 10.6* 9.6* HCT 34.5* 31.6*  
* 538* Recent Labs  
  03/16/20 
0053 03/15/20 
0412 03/14/20 
0222  139 138  
K 3.7 4.6 3.8  108 109* CO2 23 24 23 * 127* 97 BUN 33* 28* 24* CREA 2.19* 2.06* 1.95* CA 9.1 8.5 9.1 INR 3.7* 2.5* 1.8* Signed: Adaline Epley, MD

## 2020-03-16 NOTE — PROGRESS NOTES
Pharmacy Dosing of Warfarin Indication: A. fib Goal INR: 2-3 PTA Warfarin Dose: 4 mg on Sun, Wed and 2 mg on all other days Concurrent anticoagulants: none Concurrent antiplatelet: none Major Interacting Medications Drugs that may increase INR: flagyl; levaquin Drugs that may decrease INR: Phytonadione 2.5 mg on 3/9 Conditions that may increase/decrease INR (CHF, C. diff, cirrhosis, thyroid disorder, hypoalbuminemia): None Labs: 
Recent Labs  
  03/16/20 
0053 03/15/20 
0412  03/14/20 
0222 INR 3.7* 2.5*  --  1.8* HGB 10.6* 9.6*   < >  --   
* 538*  --   --   
 < > = values in this interval not displayed. Estimated Creatinine Clearance: 35.6 mL/min (A) (based on SCr of 2.19 mg/dL (H)). Impression/Plan:   
INR increased to 3.7 today; no warfarin today Hgb >10; no procrit today INR daily, CBC w/o diff every other day Thank you, Jaiden Llanes, PHARMD

## 2020-03-16 NOTE — PROGRESS NOTES
Nephrology Progress Note [facility] 
[email or web address]                  Phone - [phone] Patient: Fatou Rashid Date- 3/16/2020 Admit Date: 3/8/2020 YOB: 1960 CC: Follow up for CKD, HTN Subjective: Interval History:  
- na stable MILD INCREASE IN CR Bp stable Leg edema better No C/O of chest pain,SOB, vomiting, abdominal pain,fever,chills ROS:- as above IMPRESSION:  
· CKD  3 LIKELY due to DM nephropathy and HTN nephrosclerosis- bl. Cr. 1.8-2.1 · Hyponatremia due to fluid overload · edema · hypertension · Diabetic foot · Proteinuria likely due to DM nephropathy and HTN nephroscl- urine pr/cr 1.5 g/g · Metabolic acidosis · Hypokalemia · anemia iron defi - iron sats 10% · Vit d defi PLAN:  
· Hold bumex. Leg edema much better, no hypoxia, cr. Slowly increasing · Continue po bicarb · S/p iv iron · Continue epogen · Follow bmp · Continue hydralazine and cardura · Hold clonidine,  
· Hold metolazone Physical exam:  
GEN:  NAD NECK:  Supple, no thyromegaly RESP: CTA  b/l, no  wheezing, CVS: RRR,S1,S2 ABDO:  soft , non tender, No mass NEURO: non focal, normal speech EXT: Edema +nt Left Foot dressing + Care Plan discussed with: patient, 
Objective:  
Visit Vitals /58 (BP 1 Location: Right arm) Pulse 96 Temp 98 °F (36.7 °C) Resp 18 Ht 5' 9\" (1.753 m) Wt 107 kg (235 lb 14.4 oz) SpO2 100% BMI 34.84 kg/m² Last 3 Recorded Weights in this Encounter 03/14/20 0236 03/15/20 0812 03/16/20 7332 Weight: 108 kg (238 lb) 107 kg (235 lb 14.3 oz) 107 kg (235 lb 14.4 oz) 03/14 1901 - 03/16 0700 In: 200 [P.O.:100; I.V.:100] Out: 1500 [Urine:1500] Intake/Output Summary (Last 24 hours) at 3/16/2020 1014 Last data filed at 3/16/2020 1592 Gross per 24 hour Intake 200 ml Output 700 ml Net -500 ml Chart reviewed. Pertinent Notes reviewed. Medication list  reviewed Current Facility-Administered Medications Medication  levoFLOXacin (LEVAQUIN) 750 mg in D5W IVPB  metroNIDAZOLE (FLAGYL) IVPB premix 500 mg  
 alcohol 62% (NOZIN) nasal  1 Ampule  epoetin viet-epbx (RETACRIT) 12,000 Units combo injection  HYDROcodone-acetaminophen (NORCO) 5-325 mg per tablet 1 Tab  docusate sodium (COLACE) capsule 100 mg  
 HYDROmorphone (PF) (DILAUDID) injection 0.5 mg  
 sodium hypochlorite (HALF STRENGTH DAKIN'S) 0.25% irrigation (bottle)  dilTIAZem (CARDIZEM) IR tablet 60 mg  WARFARIN INFORMATION NOTE (COUMADIN)  sodium bicarbonate tablet 650 mg  
 sodium chloride (NS) flush 5-10 mL  busPIRone (BUSPAR) tablet 10 mg  
 cholecalciferol (VITAMIN D3) (1000 Units /25 mcg) tablet 5 Tab  cyanocobalamin (VITAMIN B12) tablet 1,000 mcg  doxazosin (CARDURA) tablet 4 mg  hydrALAZINE (APRESOLINE) tablet 100 mg  
 metoprolol succinate (TOPROL-XL) XL tablet 100 mg  potassium chloride SR (KLOR-CON 10) tablet 10 mEq  sertraline (ZOLOFT) tablet 50 mg  
 sodium chloride (NS) flush 5-40 mL  sodium chloride (NS) flush 5-40 mL  acetaminophen (TYLENOL) tablet 650 mg  
 naloxone (NARCAN) injection 0.4 mg  
 glucose chewable tablet 16 g  
 dextrose (D50W) injection syrg 12.5-25 g  
 glucagon (GLUCAGEN) injection 1 mg  
 insulin lispro (HUMALOG) injection Data Review : 
Recent Labs  
  03/16/20 
0053 03/15/20 
0412 03/14/20 
0222  139 138  
K 3.7 4.6 3.8  108 109* CO2 23 24 23 BUN 33* 28* 24* CREA 2.19* 2.06* 1.95* * 127* 97  
CA 9.1 8.5 9.1 Recent Labs  
  03/16/20 
0053 03/15/20 
1190 WBC 12.2* 10.6 HGB 10.6* 9.6* HCT 34.5* 31.6*  
* 538* No results for input(s): FE, TIBC, PSAT, FERR in the last 72 hours. No results for input(s): CPK, CKNDX, TROIQ in the last 72 hours. No lab exists for component: CPKMB Lab Results Component Value Date/Time Color YELLOW/STRAW 03/08/2020 05:00 PM  
 Appearance CLOUDY (A) 03/08/2020 05:00 PM  
 Specific gravity 1.019 03/08/2020 05:00 PM  
 pH (UA) 5.0 03/08/2020 05:00 PM  
 Protein 100 (A) 03/08/2020 05:00 PM  
 Glucose 100 (A) 03/08/2020 05:00 PM  
 Ketone 15 (A) 03/08/2020 05:00 PM  
 Bilirubin NEGATIVE  03/08/2020 05:00 PM  
 Urobilinogen 0.2 03/08/2020 05:00 PM  
 Nitrites NEGATIVE  03/08/2020 05:00 PM  
 Leukocyte Esterase SMALL (A) 03/08/2020 05:00 PM  
 Epithelial cells FEW 03/08/2020 05:00 PM  
 Bacteria NEGATIVE  03/08/2020 05:00 PM  
 WBC 0-4 03/08/2020 05:00 PM  
 RBC 0-5 03/08/2020 05:00 PM  
 
Lab Results Component Value Date/Time Culture result: (A) 03/10/2020 07:15 PM  
  HEAVY ANAEROBIC GRAM NEGATIVE RODS BETA LACTAMASE POSITIVE Culture result: LIGHT STAPHYLOCOCCUS AUREUS (A) 03/10/2020 07:15 PM  
 Culture result: SCANT PROTEUS MIRABILIS (A) 03/10/2020 07:15 PM  
 Culture result: (A) 03/10/2020 07:15 PM  
  HEAVY STREPTOCOCCI, BETA HEMOLYTIC GROUP B Penicillin and ampicillin are drugs of choice for treatment of beta-hemolytic streptococcal infections. Susceptibility testing of penicillins and beta-lactams approved by the FDA for treatment of beta-hemolytic streptococcal infections need not be performed routinely, because nonsusceptible isolates are extremely rare. CLSI 2012 Culture result: (A) 03/10/2020 07:15 PM  
  HEAVY STREPTOCOCCI, BETA HEMOLYTIC GROUP G Penicillin and ampicillin are drugs of choice for treatment of beta-hemolytic streptococcal infections. Susceptibility testing of penicillins and beta-lactams approved by the FDA for treatment of beta-hemolytic streptococcal infections need not be performed routinely, because nonsusceptible isolates are extremely rare. CLSI 2012 Lab Results Component Value Date/Time  Specimen Description: NARES 04/09/2014 12:27 PM  
 Specimen Description: RENEE 10/28/2013 05:00 PM  
 Specimen Description: [facility] 10/28/2013 03:09 PM  
 
Lab Results Component Value Date/Time Creatinine, urine 45.70 03/09/2020 09:50 PM  
 
Results from [facility] Encounter encounter on 03/08/20 XR FOOT LT MIN 3 V Narrative EXAM: XR FOOT LT MIN 3 V 
 
INDICATION: L foot swelling; eval for osteo changes. COMPARISON: 3/3/2020. FINDINGS: Three views of the left foot demonstrate no bone erosion, periostitis 
or other evidence of osteomyelitis. There are 2 plantar soft tissue ulcers, 
without foreign body. There is chronic deformity of the distal portion of the second toe and mid foot 
osteoarthrosis. No acute fracture is seen. Arterial calcification is noted. Impression IMPRESSION: No signal change. Plantar soft tissue ulcers. No apparent 
osteomyelitis. RENAL USG 
TECHNIQUE:  
Routine ultrasound images of the kidneys and retroperitoneum were obtained. 
  
FINDINGS:  
The right kidney measures 11.5 cm in length. The left kidney measures 10.7 cm in 
length. There is increased echogenicity of the kidneys bilaterally, consistent with 
medical renal disease. There is thinning of the right renal cortex. No renal 
calculus is seen. There is no hydronephrosis. No renal cyst or solid mass is 
evident.   
  
The abdominal aorta and common iliac arteries were obscured by gas. The 
visualized portion of the IVC appears patent. The urinary bladder demonstrates 
no filling defect.  
  
IMPRESSION IMPRESSION:  
Echogenic kidneys, consistent with medical renal disease. Cortical thinning on 
the right. No hydronephrosis. . 
  
 
                Cora Soares MD 
[facility] 
 [email or web address] Jennifer / [employer] [address] [place], [address] [place] Phone - [phone] Fax - [phone]

## 2020-03-16 NOTE — PROGRESS NOTES
Problem: Falls - Risk of 
Goal: *Absence of Falls Description: Document Eryn Garcia Fall Risk and appropriate interventions in the flowsheet. Outcome: Progressing Towards Goal 
Note: Fall Risk Interventions: 
Mobility Interventions: Assess mobility with egress test, Bed/chair exit alarm Mentation Interventions: Adequate sleep, hydration, pain control, Bed/chair exit alarm, Door open when patient unattended Medication Interventions: Assess postural VS orthostatic hypotension, Bed/chair exit alarm Elimination Interventions: Bed/chair exit alarm, Call light in reach History of Falls Interventions: Bed/chair exit alarm, Door open when patient unattended Problem: Patient Education: Go to Patient Education Activity Goal: Patient/Family Education Outcome: Progressing Towards Goal 
  
Problem: Pressure Injury - Risk of 
Goal: *Prevention of pressure injury Description: Document Valdemar Scale and appropriate interventions in the flowsheet. Outcome: Progressing Towards Goal 
Note: Pressure Injury Interventions: 
Sensory Interventions: Assess changes in LOC, Assess need for specialty bed, Avoid rigorous massage over bony prominences Moisture Interventions: Absorbent underpads, Apply protective barrier, creams and emollients Activity Interventions: Assess need for specialty bed, Chair cushion, Increase time out of bed Mobility Interventions: Assess need for specialty bed, Chair cushion, Float heels Nutrition Interventions: Document food/fluid/supplement intake, Discuss nutritional consult with provider, Offer support with meals,snacks and hydration Friction and Shear Interventions: Apply protective barrier, creams and emollients, Feet elevated on foot rest 
 
  
 
 
 
  
Problem: Patient Education: Go to Patient Education Activity Goal: Patient/Family Education Outcome: Progressing Towards Goal 
  
Problem: Hypertension Goal: *Blood pressure within specified parameters Outcome: Progressing Towards Goal 
  
Problem: Pain Goal: *Control of Pain Outcome: Progressing Towards Goal 
  
Problem: Infection - Risk of, Surgical Site Infection Goal: *Absence of surgical site infection signs and symptoms Outcome: Progressing Towards Goal 
  
Problem: Patient Education: Go to Patient Education Activity Goal: Patient/Family Education Outcome: Progressing Towards Goal

## 2020-03-16 NOTE — ROUTINE PROCESS
Bedside, Verbal and Written shift change report given to Isauro Verdin RN  (oncoming nurse) by Cayla Ingram RN  (offgoing nurse). Report included the following information SBAR, Kardex, MAR and Recent Results.

## 2020-03-16 NOTE — PROGRESS NOTES
Problem: Mobility Impaired (Adult and Pediatric) Goal: *Acute Goals and Plan of Care (Insert Text) Description: FUNCTIONAL STATUS PRIOR TO ADMISSION: Patient was modified independent using a rolling walker for functional mobility. Patient required minimal assistance for basic and instrumental ADLs. HOME SUPPORT PRIOR TO ADMISSION: The patient lived with who assists with ADLs and meals. Physical Therapy Goals Initiated 3/16/2020 1. Patient will move from supine to sit and sit to supine , scoot up and down and roll side to side in bed with modified independence within 7 day(s). 2.  Patient will transfer from bed to chair and chair to bed with minimal assistance/contact guard assist using the least restrictive device within 7 day(s). 3.  Patient will perform sit to stand with contact guard assist within 7 day(s). 4.  Patient will ambulate with minimal assistance/contact guard assist for 30 feet with the least restrictive device within 7 day(s). Outcome: Not Met PHYSICAL THERAPY EVALUATION Patient: Lam Morgan (4 y.o. female) Date: 3/16/2020 Primary Diagnosis: Atrial fibrillation with RVR ([address] [I48.91] Left leg cellulitis [H62.801] Procedure(s) (LRB): 
SKIN SUBSTITUTE GRAFT APPLICATION LEFT FOOT MISONIX DEBRIDEMENT (INTEGRA GRAFT OR THERASKIN GRAFT 4X4CM) (Left) 2 Days Post-Op Precautions:  Fall ASSESSMENT Based on the objective data described below, the patient presents with generalized weakness, uncertain WB status with B foot wounds and upcoming skin graft planned, decreased standing balance, decreased activity tolerance and impaired mobility skills. She was able to transfer bed to recliner with RW and min a x 2 with PWB on L foot. BLE elevated on bolsters from room sofa as recliner not working. Note left for MD to specify WB status.  Patient may be able to go home with wheelchair as her home has a ramp depending on progress toward PT goals and likely change to NWB once skin graft is done. She may need SNF if unable to adequately transfer with NWB. Current Level of Function Impacting Discharge (mobility/balance): min/mod a x 2 Functional Outcome Measure: The patient scored 45/100 on the Barthel outcome measure which is indicative of 55% functional impairment. Other factors to consider for discharge: will need wheelchair with elevating leg rests if discharged home. Patient will benefit from skilled therapy intervention to address the above noted impairments. PLAN : 
Recommendations and Planned Interventions: bed mobility training, transfer training, gait training, therapeutic exercises, edema management/control, patient and family training/education, and therapeutic activities Frequency/Duration: Patient will be followed by physical therapy:  4 times a week to address goals. Recommendation for discharge: (in order for the patient to meet his/her long term goals) Therapy up to 5 days/week in SNF setting or intensive home health therapy program 
 
This discharge recommendation: 
Has been made in collaboration with the attending provider and/or case management IF patient discharges home will need the following DME: wheelchair SUBJECTIVE:  
Patient stated I think I will be in the hospital for a good while yet.  OBJECTIVE DATA SUMMARY:  
HISTORY:   
Past Medical History:  
Diagnosis Date Acquired lymphedema Right arm Arrhythmia Asthma Atrial fibrillation ([facility] Dr. Bon Patel and Dr. Miles Leigh  
 Atrial flutter [facility]) Cancer ([facility] bilateral breast  
 Chronic kidney disease Diabetes mellitus type II   
 Dizziness Essential hypertension Hyperlipidemia Hypertension Long term (current) use of anticoagulants Morbid obesity ([facility] 3/14/2012 Nausea & vomiting Osteoarthritis Pacemaker Sick sinus syndrome ([facility] Past Surgical History:  
Procedure Laterality Date ABDOMEN SURGERY PROC UNLISTED    
 gastric bypass GASTRIC BYPASS,OBESE<150CM SAW-EN-Y  7/08  
 [place] HX AFIB ABLATION    
 HX CARPAL TUNNEL RELEASE    
 HX CERVICAL FUSION  1994 [address] RIGHT MODIFIED RADICAL   
 HX MOHS PROCEDURES  1995  
 right HX ORTHOPAEDIC Right   
 knee surgery HX PACEMAKER    
 IR INSERT TUNL CVC W PORT OVER [address] NEEDLE BIOPSY LIVER,W OTHR [address]  8.21.07  
 [place] [address] AND 1994 [place] TRABECULOPLASTY BY LASER SURGERY    
 US GUIDE ASP OVARIAN CYST ABD APPROACH  8.21.07  
 RIGHT Personal factors and/or comorbidities impacting plan of care: dm, ppm, htn, ckd4, morbid obesity Home Situation Home Environment: Private residence # Steps to Enter: 0 Wheelchair Ramp: Yes One/Two Story Residence: One story Living Alone: No 
Support Systems: Spouse/Significant Other/Partner, Family member(s) Patient Expects to be Discharged to[de-identified] Private residence Current DME Used/Available at Home: Walker, rolling, Cane, straight Tub or Shower Type: Tub/Shower combination(sponge bathes) EXAMINATION/PRESENTATION/DECISION MAKING:  
Critical Behavior: 
Neurologic State: Alert Orientation Level: Oriented X4 Cognition: Appropriate decision making, Appropriate for age attention/concentration, Appropriate safety awareness, Follows commands Safety/Judgement: Fall prevention, Awareness of environment, Good awareness of safety precautions(Weakness) Hearing: Auditory Auditory Impairment: None Hearing Aids/Status: Does not own Skin:  dressings intact B feet Edema: moderate BLEs Range Of Motion: 
AROM: Within functional limits PROM: Within functional limits Strength:   
Strength: Generally decreased, functional 
  
  
  
  
  
  
Tone & Sensation:  
Tone: Normal 
  
  
  
  
Sensation: Impaired(poor B feet) Coordination: Coordination: Generally decreased, functional 
Vision:  
Acuity: Within Defined Limits Corrective Lenses: Reading glasses Functional Mobility: 
Bed Mobility: 
Rolling: Stand-by assistance Supine to Sit: Minimum assistance Scooting: Contact guard assistance Transfers: 
Sit to Stand: Minimum assistance; Moderate assistance;Assist x2 Stand to Sit: Minimum assistance Bed to Chair: Minimum assistance;Assist x2;Adaptive equipment(RW) 
     
  
  
Balance:  
Sitting: Intact Standing: Impaired Standing - Static: Fair;Constant support Standing - Dynamic : Poor;Constant support Ambulation/Gait Training: 
Distance (ft): 3 Feet (ft) Assistive Device: Gait belt;Walker, rolling Ambulation - Level of Assistance: Minimal assistance;Assist x2 Gait Description (WDL): Exceptions to [facility] Gait Abnormalities: Decreased step clearance; Antalgic; Step to gait;Trunk sway increased Right Side Weight Bearing: (uncertain - requested MD clarification) Base of Support: Widened Speed/Kat: Slow Step Length: Right shortened;Left shortened Therapeutic Exercises: Ankle pumps, quad sets, glut sets. Functional Measure: 
Barthel Index: 
 
Bathin Bladder: 5 Bowels: 5 Groomin Dressin Feeding: 10 Mobility: 5 Stairs: 0 Toilet Use: 5 Transfer (Bed to Chair and Back): 5 Total: 45/100 The Barthel ADL Index: Guidelines 1. The index should be used as a record of what a patient does, not as a record of what a patient could do. 2. The main aim is to establish degree of independence from any help, physical or verbal, however minor and for whatever reason. 3. The need for supervision renders the patient not independent. 4. A patient's performance should be established using the best available evidence. Asking the patient, friends/relatives and nurses are the usual sources, but direct observation and common sense are also important. However direct testing is not needed. 5. Usually the patient's performance over the preceding 24-48 hours is important, but occasionally longer periods will be relevant. 6. Middle categories imply that the patient supplies over 50 per cent of the effort. 7. Use of aids to be independent is allowed. Leandrew Cellar., Barthel, D.W. ([id number]). Functional evaluation: the Barthel Index. [facility] (14)2. Trang Lopez kaykay LIZY Moul[place], Parvin Canela., Gwen Fraire., [place], [facility] (1999). Measuring the change indisability after inpatient rehabilitation; comparison of the responsiveness of the Barthel Index and Functional [place] Measure. Journal of Neurology, Neurosurgery, and Psychiatry, 66(4), 621-320. JOÃO Abraham, BLAISE Rosa, & Nata Hernandez MPREM (2004.) Assessment of post-stroke quality of life in cost-effectiveness studies: The usefulness of the Barthel Index and the EuroQoL-5D. [facility], 13, 319-91 Physical Therapy Evaluation Charge Determination History Examination Presentation Decision-Making High MEDIUM Complexity : 3 Standardized tests and measures addressing body structure, function, activity limitation and / or participation in recreation  MEDIUM Complexity : Evolving with changing characteristics  Other outcome measures Barthel  MEDIUM Based on the above components, the patient evaluation is determined to be of the following complexity level: MEDIUM Pain Rating: 
None reported Activity Tolerance:  
Fair, SpO2 stable on RA, and requires rest breaks Please refer to the flowsheet for vital signs taken during this treatment. After treatment patient left in no apparent distress:  
Sitting in chair and Call bell within reach COMMUNICATION/EDUCATION:  
The patients plan of care was discussed with: Registered nurse and Case management.   
 
Fall prevention education was provided and the patient/caregiver indicated understanding., Patient/family have participated as able in goal setting and plan of care. , and Patient/family agree to work toward stated goals and plan of care. Thank you for this referral. 
Kavita Fostre, PT Time Calculation: 29 mins

## 2020-03-16 NOTE — PROGRESS NOTES
Orders received, chart reviewed and patient evaluated by physical therapy. Pending progression with skilled acute physical therapy, recommend: 
Therapy up to 5 days/week in SNF setting or intensive home health therapy program. Will need a wheelchair with elevating leg rest if she goes home. Recommend with nursing patient to complete as able in order to maintain strength, endurance and independence: OOB to chair 3x/day with assistance x 2 and walker. Thank you for your assistance. Full evaluation to follow.   
 
Perry Riggs, PT

## 2020-03-16 NOTE — PROGRESS NOTES
Problem: Falls - Risk of 
Goal: *Absence of Falls Description: Document Dieter Bower Fall Risk and appropriate interventions in the flowsheet. Outcome: Progressing Towards Goal 
Note: Fall Risk Interventions: 
Mobility Interventions: Bed/chair exit alarm, Communicate number of staff needed for ambulation/transfer, Mechanical lift, OT consult for ADLs, Patient to call before getting OOB, PT Consult for mobility concerns Mentation Interventions: Adequate sleep, hydration, pain control, Bed/chair exit alarm, Door open when patient unattended, Evaluate medications/consider consulting pharmacy, Eyeglasses and hearing aids, Familiar objects from home Medication Interventions: Bed/chair exit alarm, Evaluate medications/consider consulting pharmacy, Patient to call before getting OOB, Teach patient to arise slowly Elimination Interventions: Bed/chair exit alarm, Call light in reach, Elevated toilet seat, Patient to call for help with toileting needs, Stay With Me (per policy), Toilet paper/wipes in reach, Toileting schedule/hourly rounds History of Falls Interventions: Bed/chair exit alarm, Consult care management for discharge planning, Door open when patient unattended, Evaluate medications/consider consulting pharmacy, Investigate reason for fall, Room close to nurse's station, Utilize gait belt for transfer/ambulation, Assess for delayed presentation/identification of injury for 48 hrs (comment for end date) Problem: Patient Education: Go to Patient Education Activity Goal: Patient/Family Education Outcome: Progressing Towards Goal 
  
Problem: Pressure Injury - Risk of 
Goal: *Prevention of pressure injury Description: Document Valdemar Scale and appropriate interventions in the flowsheet.  
Outcome: Progressing Towards Goal 
Note: Pressure Injury Interventions: 
Sensory Interventions: Assess changes in LOC, Assess need for specialty bed, Avoid rigorous massage over bony prominences, Chair cushion, Check visual cues for pain, Discuss PT/OT consult with provider, Float heels, Keep linens dry and wrinkle-free Moisture Interventions: Absorbent underpads, Apply protective barrier, creams and emollients, Assess need for specialty bed, Check for incontinence Q2 hours and as needed, Contain wound drainage, Internal/External fecal devices, Internal/External urinary devices, Limit adult briefs Activity Interventions: Assess need for specialty bed, Increase time out of bed, Pressure redistribution bed/mattress(bed type), PT/OT evaluation Mobility Interventions: Assess need for specialty bed, Float heels, HOB 30 degrees or less, Pressure redistribution bed/mattress (bed type), PT/OT evaluation Nutrition Interventions: Document food/fluid/supplement intake, Discuss nutritional consult with provider, Offer support with meals,snacks and hydration Friction and Shear Interventions: Apply protective barrier, creams and emollients, Feet elevated on foot rest, Foam dressings/transparent film/skin sealants, HOB 30 degrees or less, Lift sheet, Lift team/patient mobility team, Minimize layers, Sit at 90-degree angle Problem: Patient Education: Go to Patient Education Activity Goal: Patient/Family Education Outcome: Progressing Towards Goal 
  
Problem: Diabetes Self-Management Goal: *Disease process and treatment process Description: Define diabetes and identify own type of diabetes; list 3 options for treating diabetes. Outcome: Progressing Towards Goal 
Goal: *Incorporating nutritional management into lifestyle Description: Describe effect of type, amount and timing of food on blood glucose; list 3 methods for planning meals. Outcome: Progressing Towards Goal 
Goal: *Incorporating physical activity into lifestyle Description: State effect of exercise on blood glucose levels.  
Outcome: Progressing Towards Goal 
 Goal: *Developing strategies to promote health/change behavior Description: Define the ABC's of diabetes; identify appropriate screenings, schedule and personal plan for screenings. Outcome: Progressing Towards Goal 
Goal: *Using medications safely Description: State effect of diabetes medications on diabetes; name diabetes medication taking, action and side effects. Outcome: Progressing Towards Goal 
Goal: *Monitoring blood glucose, interpreting and using results Description: Identify recommended blood glucose targets  and personal targets. Outcome: Progressing Towards Goal 
Goal: *Prevention, detection, treatment of acute complications Description: List symptoms of hyper- and hypoglycemia; describe how to treat low blood sugar and actions for lowering  high blood glucose level. Outcome: Progressing Towards Goal 
Goal: *Prevention, detection and treatment of chronic complications Description: Define the natural course of diabetes and describe the relationship of blood glucose levels to long term complications of diabetes. Outcome: Progressing Towards Goal 
Goal: *Developing strategies to address psychosocial issues Description: Describe feelings about living with diabetes; identify support needed and support network Outcome: Progressing Towards Goal 
Goal: *Insulin pump training Outcome: Progressing Towards Goal 
Goal: *Sick day guidelines Outcome: Progressing Towards Goal 
Goal: *Patient Specific Goal (EDIT GOAL, INSERT TEXT) Outcome: Progressing Towards Goal 
  
Problem: Patient Education: Go to Patient Education Activity Goal: Patient/Family Education Outcome: Progressing Towards Goal 
  
Problem: Hypertension Goal: *Blood pressure within specified parameters Outcome: Progressing Towards Goal 
Goal: *Fluid volume balance Outcome: Progressing Towards Goal 
Goal: *Labs within defined limits Outcome: Progressing Towards Goal 
  
Problem: Patient Education: Go to Patient Education Activity Goal: Patient/Family Education Outcome: Progressing Towards Goal 
  
Problem: Chronic Renal Failure Goal: *Fluid and electrolytes stabilized Outcome: Progressing Towards Goal 
  
Problem: Patient Education: Go to Patient Education Activity Goal: Patient/Family Education Outcome: Progressing Towards Goal 
  
Problem: Pain Goal: *Control of Pain Outcome: Progressing Towards Goal 
Goal: *PALLIATIVE CARE:  Alleviation of Pain Outcome: Progressing Towards Goal 
  
Problem: Patient Education: Go to Patient Education Activity Goal: Patient/Family Education Outcome: Progressing Towards Goal 
  
Problem: Infection - Risk of, Surgical Site Infection Goal: *Absence of surgical site infection signs and symptoms Outcome: Progressing Towards Goal 
  
Problem: Patient Education: Go to Patient Education Activity Goal: Patient/Family Education Outcome: Progressing Towards Goal 
  
Problem: Patient Education: Go to Patient Education Activity Goal: Patient/Family Education Outcome: Progressing Towards Goal 
  
Problem: Afib Pathway: Day 1 Goal: Off Pathway (Use only if patient is Off Pathway) Outcome: Progressing Towards Goal 
Goal: Activity/Safety Outcome: Progressing Towards Goal 
Goal: Consults, if ordered Outcome: Progressing Towards Goal 
Goal: Diagnostic Test/Procedures Outcome: Progressing Towards Goal 
Goal: Nutrition/Diet Outcome: Progressing Towards Goal 
Goal: Discharge Planning Outcome: Progressing Towards Goal 
Goal: Medications Outcome: Progressing Towards Goal 
Goal: Respiratory Outcome: Progressing Towards Goal 
Goal: Treatments/Interventions/Procedures Outcome: Progressing Towards Goal 
Goal: Psychosocial 
Outcome: Progressing Towards Goal 
Goal: *Optimal pain control at patient's stated goal 
Outcome: Progressing Towards Goal 
Goal: *Hemodynamically stable Outcome: Progressing Towards Goal 
Goal: *Stable cardiac rhythm Outcome: Progressing Towards Goal 
Goal: *Lungs clear or at baseline Outcome: Progressing Towards Goal 
Goal: *Labs within defined limits Outcome: Progressing Towards Goal 
Goal: *Describes available resources and support systems Outcome: Progressing Towards Goal 
  
Problem: Afib Pathway: Day 2 Goal: Off Pathway (Use only if patient is Off Pathway) Outcome: Progressing Towards Goal 
Goal: Activity/Safety Outcome: Progressing Towards Goal 
Goal: Consults, if ordered Outcome: Progressing Towards Goal 
Goal: Diagnostic Test/Procedures Outcome: Progressing Towards Goal 
Goal: Nutrition/Diet Outcome: Progressing Towards Goal 
Goal: Discharge Planning Outcome: Progressing Towards Goal 
Goal: Medications Outcome: Progressing Towards Goal 
Goal: Respiratory Outcome: Progressing Towards Goal 
Goal: Treatments/Interventions/Procedures Outcome: Progressing Towards Goal 
Goal: Psychosocial 
Outcome: Progressing Towards Goal 
Goal: *Hemodynamically stable Outcome: Progressing Towards Goal 
Goal: *Optimal pain control at patient's stated goal 
Outcome: Progressing Towards Goal 
Goal: *Stable cardiac rhythm Outcome: Progressing Towards Goal 
Goal: *Lungs clear or at baseline Outcome: Progressing Towards Goal 
Goal: *Describes available resources and support systems Outcome: Progressing Towards Goal 
  
Problem: Afib Pathway: Day 3 Goal: Off Pathway (Use only if patient is Off Pathway) Outcome: Progressing Towards Goal 
Goal: Activity/Safety Outcome: Progressing Towards Goal 
Goal: Diagnostic Test/Procedures Outcome: Progressing Towards Goal 
Goal: Nutrition/Diet Outcome: Progressing Towards Goal 
Goal: Discharge Planning Outcome: Progressing Towards Goal 
Goal: Medications Outcome: Progressing Towards Goal 
Goal: Respiratory Outcome: Progressing Towards Goal 
Goal: Treatments/Interventions/Procedures Outcome: Progressing Towards Goal 
Goal: Psychosocial 
Outcome: Progressing Towards Goal 
  
Problem: Afib: Discharge Outcomes (not in CAT) Goal: *Hemodynamically stable Outcome: Progressing Towards Goal 
Goal: *Stable cardiac rhythm Outcome: Progressing Towards Goal 
Goal: *Lungs clear or at baseline Outcome: Progressing Towards Goal 
Goal: *Optimal pain control at patient's stated goal 
Outcome: Progressing Towards Goal 
Goal: *Identifies cardiac risk factors Outcome: Progressing Towards Goal 
Goal: *Verbalizes home exercise program, activity guidelines, cardiac precautions Outcome: Progressing Towards Goal 
Goal: *Verbalizes understanding and describes prescribed diet Outcome: Progressing Towards Goal 
Goal: *Verbalizes understanding and describes medication purposes and frequencies Outcome: Progressing Towards Goal 
Goal: *Anxiety reduced or absent Outcome: Progressing Towards Goal 
Goal: *Understands and describes signs and symptoms to report to providers(Stroke Metric) Outcome: Progressing Towards Goal 
Goal: *Describes follow-up/return visits to physicians Outcome: Progressing Towards Goal 
Goal: *Describes available resources and support systems Outcome: Progressing Towards Goal 
Goal: *Influenza immunization Outcome: Progressing Towards Goal 
Goal: *Pneumococcal immunization Outcome: Progressing Towards Goal 
Goal: *Describes smoking cessation resources Outcome: Progressing Towards Goal

## 2020-03-16 NOTE — PROCEDURES
Patient Name: Gwen Yeboah : 1960 Age: 60 y.o. Ordering physician: No ref. provider found Date of EE20 EEG procedure number: [id number] Diagnosis: alt mental status Interpreting physician: Maggie Collado MD 
 
 
ELECTROENCEPHALOGRAM REPORT  
 
PROCEDURE: EEG. CLINICAL INDICATION: The patient is a 60 y.o. female who is being evaluated for baseline electro cerebral activities and to rule out seizure focus. EEG CLASSIFICATION: Normal 
 
DESCRIPTION OF THE RECORD:  
The background of this recording contains a posteriorly-located occipital alpha rhythm of 8 Hz that attenuates with eye opening. Throughout the recording, there were no clear areas of focal slowing nor spike or spike-and-wave discharges seen. Hyperventilation was not performed. Photic stimulation produced no response in the posterior head regions. During the recording the patient did not achieve stage II sleep INTERPRETATION: This is a normal electroencephalogram with the patient awake, showing no clear focal abnormalities or epileptiform activity. A normal EEG doesn't not rule out seizures. Clinical correlation recommended.  
 
 
Maggie Collado MD 
Neurologist

## 2020-03-16 NOTE — PROGRESS NOTES
Bedside shift change report GIVEN TO Nico Noel RN. Report included the following information SBAR, Kardex and MAR. SIGNIFICANT CHANGES DURING SHIFT: K 2.7, notified telehospitalist, orders were received. CONCERNS TO ADDRESS WITH MD:   
 
 
 
 
[facility] NURSING NOTE Admission Date 3/8/2020 Admission Diagnosis Atrial fibrillation with RVR ([address] [I48.91] Left leg cellulitis [V37.265] Consults IP CONSULT TO CARDIOLOGY 
IP CONSULT TO PODIATRY IP CONSULT TO NEPHROLOGY 
IP CONSULT TO NEUROLOGY Cardiac Monitoring [x] Yes [] No  
  
Purposeful Hourly Rounding [x] Yes   
Rayne Score Total Score: 3 Rayne score 3 or > [x] Bed Alarm [] Avasys [] 1:1 sitter [] Patient refused (Signed refusal form in chart) Valdemar Score Valdemar Score: 13 Valdemar score 14 or < [] PMT consult [] Wound Care consult  
 []  Specialty bed  [] Nutrition consult Influenza Vaccine Received Flu Vaccine for Current Season (usually Sept-March): Yes Oxygen needs? [x] Room air Oxygen @  []1L    []2L    []3L   []4L    []5L   []6L via NC Chronic home O2 use? [] Yes [] No 
Perform O2 challenge test and document in progress note using smartphrase (.Homeoxygen) Last bowel movement Last Bowel Movement Date: 03/12/20 Urinary Catheter External Female Catheter 03/10/20-Urine Output (mL): 300 ml LDAs Peripheral IV 03/14/20 Posterior; Left Forearm (Active) Site Assessment Clean, dry, & intact 3/16/2020  3:31 AM  
Phlebitis Assessment 0 3/16/2020  3:31 AM  
Infiltration Assessment 0 3/16/2020  3:31 AM  
Dressing Status Clean, dry, & intact 3/16/2020  3:31 AM  
Dressing Type Transparent 3/16/2020  3:31 AM  
Hub Color/Line Status Blue 3/16/2020  3:31 AM  
      
External Female Catheter 03/10/20 (Active) Site Assessment Clean, dry, & intact 3/16/2020  3:31 AM  
Repositioned Yes 3/16/2020  3:31 AM  
Perineal Care Yes 3/16/2020  3:31 AM  
Wick Changed Yes 3/16/2020  3:31 AM  
 Suction Canister/Tubing Changed No 3/16/2020  3:31 AM  
Urine Output (mL) 300 ml 3/15/2020  6:54 PM  
            
  
Readmission Risk Assessment Tool Score High Risk 35 Total Score 3 Has Seen PCP in Last 6 Months (Yes=3, No=0) 3 Patient Length of Stay (>5 days = 3) 4 IP Visits Last 12 Months (1-3=4, 4=9, >4=11) 23 Charlson Comorbidity Score (Age + Comorbid Conditions) Criteria that do not apply:  
 . Living with Significant Other. Assisted Living. LTAC. SNF. or  
Rehab Pt. Coverage (Medicare=5 , Medicaid, or Self-Pay=4) Expected Length of Stay 3d 7h Actual Length of Stay 8

## 2020-03-16 NOTE — PROGRESS NOTES
Bedside shift change report GIVEN TO Nikki Mcwilliams RN. Report included the following information SBAR, Kardex, Intake/Output, MAR and Recent Results. SIGNIFICANT CHANGES DURING SHIFT:   
 
 
CONCERNS TO ADDRESS WITH MD:  * [facility] [address] NURSING NOTE Admission Date 3/8/2020 Admission Diagnosis Atrial fibrillation with RVR [address] [address] [I48.91] Left leg cellulitis [Q47.649] Consults IP CONSULT TO CARDIOLOGY 
IP CONSULT TO PODIATRY IP CONSULT TO NEPHROLOGY 
IP CONSULT TO NEUROLOGY Cardiac Monitoring [x] Yes [] No  
  
Purposeful Hourly Rounding [x] Yes   
Rayne Score Total Score: 3 Rayne score 3 or > [x] Bed Alarm [] Avasys [] 1:1 sitter [] Patient refused (Signed refusal form in chart) Valdemar Score Valdemar Score: 15 Valdemar score 14 or < [x] PMT consult [] Wound Care consult  
 []  Specialty bed  [] Nutrition consult Influenza Vaccine Received Flu Vaccine for Current Season (usually Sept-March): Yes Oxygen needs? [x] Room air Oxygen @  []1L    []2L    []3L   []4L    []5L   []6L via NC Chronic home O2 use? [] Yes [x] No 
Perform O2 challenge test and document in progress note using smartphClassWallete (.Homeoxygen) Last bowel movement Last Bowel Movement Date: 03/12/20 Urinary Catheter External Female Catheter 03/10/20-Urine Output (mL): 300 ml LDAs Peripheral IV 03/14/20 Posterior; Left Forearm (Active) Site Assessment Clean, dry, & intact 3/16/2020  7:40 PM  
Phlebitis Assessment 0 3/16/2020  7:40 PM  
Infiltration Assessment 0 3/16/2020  7:40 PM  
Dressing Status Clean, dry, & intact 3/16/2020  7:40 PM  
Dressing Type Transparent 3/16/2020  7:40 PM  
Hub Color/Line Status Blue;Capped 3/16/2020  7:40 PM  
      
External Female Catheter 03/10/20 (Active) Site Assessment Clean, dry, & intact; Clean 3/16/2020  7:40 PM  
Repositioned No 3/16/2020  7:40 PM  
Perineal Care No 3/16/2020  7:40 PM  
Wick Changed No 3/16/2020  7:40 PM  
 Suction Canister/Tubing Changed No 3/16/2020  7:40 PM  
Urine Output (mL) 300 ml 3/15/2020  6:54 PM  
            
  
Readmission Risk Assessment Tool Score High Risk 28 Total Score 3 Has Seen PCP in Last 6 Months (Yes=3, No=0) 2 . Living with Significant Other. Assisted Living. LTAC. SNF. or  
Rehab  
 3 Patient Length of Stay (>5 days = 3) 4 IP Visits Last 12 Months (1-3=4, 4=9, >4=11) 23 Charlson Comorbidity Score (Age + Comorbid Conditions) Criteria that do not apply:  
 Pt. Coverage (Medicare=5 , Medicaid, or Self-Pay=4) Expected Length of Stay 6d 12h Actual Length of Stay 8

## 2020-03-17 LAB
ANION GAP SERPL CALC-SCNC: 4 MMOL/L (ref 5–15)
BUN SERPL-MCNC: 36 MG/DL (ref 6–20)
BUN/CREAT SERPL: 15 (ref 12–20)
CALCIUM SERPL-MCNC: 9.2 MG/DL (ref 8.5–10.1)
CHLORIDE SERPL-SCNC: 103 MMOL/L (ref 97–108)
CO2 SERPL-SCNC: 29 MMOL/L (ref 21–32)
CREAT SERPL-MCNC: 2.41 MG/DL (ref 0.55–1.02)
GLUCOSE BLD STRIP.AUTO-MCNC: 147 MG/DL (ref 65–100)
GLUCOSE BLD STRIP.AUTO-MCNC: 148 MG/DL (ref 65–100)
GLUCOSE BLD STRIP.AUTO-MCNC: 193 MG/DL (ref 65–100)
GLUCOSE BLD STRIP.AUTO-MCNC: 211 MG/DL (ref 65–100)
GLUCOSE SERPL-MCNC: 230 MG/DL (ref 65–100)
INR PPP: 3.7 (ref 0.9–1.1)
POTASSIUM SERPL-SCNC: 3.4 MMOL/L (ref 3.5–5.1)
PROTHROMBIN TIME: 34.4 SEC (ref 9–11.1)
SERVICE CMNT-IMP: ABNORMAL
SODIUM SERPL-SCNC: 136 MMOL/L (ref 136–145)

## 2020-03-17 PROCEDURE — 65660000000 HC RM CCU STEPDOWN

## 2020-03-17 PROCEDURE — 82962 GLUCOSE BLOOD TEST: CPT

## 2020-03-17 PROCEDURE — 74011250637 HC RX REV CODE- 250/637: Performed by: FAMILY MEDICINE

## 2020-03-17 PROCEDURE — 36415 COLL VENOUS BLD VENIPUNCTURE: CPT

## 2020-03-17 PROCEDURE — 74011250636 HC RX REV CODE- 250/636: Performed by: INTERNAL MEDICINE

## 2020-03-17 PROCEDURE — 97530 THERAPEUTIC ACTIVITIES: CPT

## 2020-03-17 PROCEDURE — 85610 PROTHROMBIN TIME: CPT

## 2020-03-17 PROCEDURE — 74011636637 HC RX REV CODE- 636/637: Performed by: PODIATRIST

## 2020-03-17 PROCEDURE — 80048 BASIC METABOLIC PNL TOTAL CA: CPT

## 2020-03-17 PROCEDURE — 74011250637 HC RX REV CODE- 250/637: Performed by: PODIATRIST

## 2020-03-17 RX ORDER — SODIUM BICARBONATE 650 MG/1
650 TABLET ORAL 2 TIMES DAILY
Status: DISCONTINUED | OUTPATIENT
Start: 2020-03-19 | End: 2020-03-19

## 2020-03-17 RX ADMIN — Medication 1 AMPULE: at 10:54

## 2020-03-17 RX ADMIN — DOCUSATE SODIUM 100 MG: 100 CAPSULE, LIQUID FILLED ORAL at 18:52

## 2020-03-17 RX ADMIN — Medication 10 ML: at 05:48

## 2020-03-17 RX ADMIN — INSULIN LISPRO 3 UNITS: 100 INJECTION, SOLUTION INTRAVENOUS; SUBCUTANEOUS at 18:52

## 2020-03-17 RX ADMIN — HYDRALAZINE HYDROCHLORIDE 100 MG: 50 TABLET, FILM COATED ORAL at 23:20

## 2020-03-17 RX ADMIN — DOXAZOSIN 4 MG: 2 TABLET ORAL at 09:43

## 2020-03-17 RX ADMIN — METRONIDAZOLE 500 MG: 500 INJECTION, SOLUTION INTRAVENOUS at 23:20

## 2020-03-17 RX ADMIN — DILTIAZEM HYDROCHLORIDE 60 MG: 60 TABLET, FILM COATED ORAL at 13:37

## 2020-03-17 RX ADMIN — INSULIN LISPRO 2 UNITS: 100 INJECTION, SOLUTION INTRAVENOUS; SUBCUTANEOUS at 07:30

## 2020-03-17 RX ADMIN — Medication 10 ML: at 13:49

## 2020-03-17 RX ADMIN — LEVOFLOXACIN 750 MG: 5 INJECTION, SOLUTION INTRAVENOUS at 13:37

## 2020-03-17 RX ADMIN — DILTIAZEM HYDROCHLORIDE 60 MG: 60 TABLET, FILM COATED ORAL at 18:52

## 2020-03-17 RX ADMIN — HYDRALAZINE HYDROCHLORIDE 100 MG: 50 TABLET, FILM COATED ORAL at 05:48

## 2020-03-17 RX ADMIN — Medication 10 ML: at 23:20

## 2020-03-17 RX ADMIN — DILTIAZEM HYDROCHLORIDE 60 MG: 60 TABLET, FILM COATED ORAL at 09:43

## 2020-03-17 RX ADMIN — CYANOCOBALAMIN TAB 500 MCG 1000 MCG: 500 TAB at 09:44

## 2020-03-17 RX ADMIN — SERTRALINE HYDROCHLORIDE 50 MG: 50 TABLET ORAL at 09:43

## 2020-03-17 RX ADMIN — SODIUM BICARBONATE 650 MG: 650 TABLET ORAL at 09:46

## 2020-03-17 RX ADMIN — INSULIN LISPRO 2 UNITS: 100 INJECTION, SOLUTION INTRAVENOUS; SUBCUTANEOUS at 13:45

## 2020-03-17 RX ADMIN — MELATONIN 5 TABLET: at 09:44

## 2020-03-17 RX ADMIN — BUSPIRONE HYDROCHLORIDE 10 MG: 10 TABLET ORAL at 09:43

## 2020-03-17 RX ADMIN — METRONIDAZOLE 500 MG: 500 INJECTION, SOLUTION INTRAVENOUS at 10:41

## 2020-03-17 RX ADMIN — METOPROLOL SUCCINATE 100 MG: 50 TABLET, EXTENDED RELEASE ORAL at 09:43

## 2020-03-17 NOTE — PROGRESS NOTES
Nephrology Progress Note [facility] K[facility] [address] [facility] 
[email or web address]                  Phone - [phone] Patient: Delma Sorenson Date- 3/17/2020 Admit Date: 3/8/2020 YOB: 1960 CC: Follow up for melchor, CKD, HTN Subjective: Interval History:  
-cr. Worse 
k low No sob 
bp stable 
bumex was stopped on 3- No C/O of chest pain,SOB, vomiting, abdominal pain,fever,chills ROS:- as above IMPRESSION:  
· Melchor - likely due to dehydration · ckd  3 LIKELY due to DM nephropathy and HTN nephrosclerosis- bl. Cr. 1.8-2.1 · Hyponatremia due to fluid overload · edema · hypertension · Diabetic foot · Proteinuria likely due to DM nephropathy and HTN nephroscl- urine pr/cr 1.5 g/g · Metabolic acidosis · Hypokalemia · anemia iron defi - iron sats 10% · Vit d defi PLAN:  
· Hold bumex. Leg edema much better, no hypoxia, cr. Slowly increasing · Hold po bicarb · S/p iv iron · Continue epogen · Follow bmp · Continue hydralazine and cardura · Hold clonidine,  
· Hold metolazone Physical exam:  
GEN:  nad NECK:  Supple, no thyromegaly RESP: clear  b/l, no  wheezing, CVS: RRR,S1,S2 ABDO:  soft , non tender NEURO: non focal, normal speech EXT: Edema +nt Left Foot dressing + Care Plan discussed with: patient, 
Objective:  
Visit Vitals /63 Pulse 97 Temp 97.8 °F (36.6 °C) Resp 18 Ht 5' 9\" (1.753 m) Wt 106.7 kg (235 lb 5 oz) SpO2 99% BMI 34.75 kg/m² Last 3 Recorded Weights in this Encounter 03/15/20 6468 03/16/20 0529 03/17/20 0124 Weight: 107 kg (235 lb 14.3 oz) 107 kg (235 lb 14.4 oz) 106.7 kg (235 lb 5 oz) 03/15 1901 - 03/17 0700 In: 540 [P.O.:440; I.V.:100] Out: 2700 [Urine:2700] Intake/Output Summary (Last 24 hours) at 3/17/2020 1124 Last data filed at 3/17/2020 8143 Gross per 24 hour Intake 340 ml Output 2300 ml Net -1960 ml  
  
 Chart reviewed. Pertinent Notes reviewed. Medication list  reviewed Current Facility-Administered Medications Medication  [START ON 3/19/2020] sodium bicarbonate tablet 650 mg  WARFARIN INFORMATION NOTE (COUMADIN)  levoFLOXacin (LEVAQUIN) 750 mg in D5W IVPB  metroNIDAZOLE (FLAGYL) IVPB premix 500 mg  
 alcohol 62% (NOZIN) nasal  1 Ampule  epoetin viet-epbx (RETACRIT) 12,000 Units combo injection  HYDROcodone-acetaminophen (NORCO) 5-325 mg per tablet 1 Tab  docusate sodium (COLACE) capsule 100 mg  
 HYDROmorphone (PF) (DILAUDID) injection 0.5 mg  
 sodium hypochlorite (HALF STRENGTH DAKIN'S) 0.25% irrigation (bottle)  dilTIAZem (CARDIZEM) IR tablet 60 mg  WARFARIN INFORMATION NOTE (COUMADIN)  sodium chloride (NS) flush 5-10 mL  busPIRone (BUSPAR) tablet 10 mg  
 cholecalciferol (VITAMIN D3) (1000 Units /25 mcg) tablet 5 Tab  cyanocobalamin (VITAMIN B12) tablet 1,000 mcg  doxazosin (CARDURA) tablet 4 mg  hydrALAZINE (APRESOLINE) tablet 100 mg  
 metoprolol succinate (TOPROL-XL) XL tablet 100 mg  
 sertraline (ZOLOFT) tablet 50 mg  
 sodium chloride (NS) flush 5-40 mL  sodium chloride (NS) flush 5-40 mL  acetaminophen (TYLENOL) tablet 650 mg  
 naloxone (NARCAN) injection 0.4 mg  
 glucose chewable tablet 16 g  
 dextrose (D50W) injection syrg 12.5-25 g  
 glucagon (GLUCAGEN) injection 1 mg  
 insulin lispro (HUMALOG) injection Data Review : 
Recent Labs  
  03/17/20 
0055 03/16/20 
0053 03/15/20 
3374  139 139  
K 3.4* 3.7 4.6  107 108 CO2 29 23 24 BUN 36* 33* 28* CREA 2.41* 2.19* 2.06* * 182* 127* CA 9.2 9.1 8.5 Recent Labs  
  03/16/20 
0053 03/15/20 
3849 WBC 12.2* 10.6 HGB 10.6* 9.6* HCT 34.5* 31.6*  
* 538* No results for input(s): FE, TIBC, PSAT, FERR in the last 72 hours. No results for input(s): CPK, CKNDX, TROIQ in the last 72 hours. No lab exists for component: CPKMB Lab Results Component Value Date/Time Color YELLOW/STRAW 03/08/2020 05:00 PM  
 Appearance CLOUDY (A) 03/08/2020 05:00 PM  
 Specific gravity 1.019 03/08/2020 05:00 PM  
 pH (UA) 5.0 03/08/2020 05:00 PM  
 Protein 100 (A) 03/08/2020 05:00 PM  
 Glucose 100 (A) 03/08/2020 05:00 PM  
 Ketone 15 (A) 03/08/2020 05:00 PM  
 Bilirubin NEGATIVE  03/08/2020 05:00 PM  
 Urobilinogen 0.2 03/08/2020 05:00 PM  
 Nitrites NEGATIVE  03/08/2020 05:00 PM  
 Leukocyte Esterase SMALL (A) 03/08/2020 05:00 PM  
 Epithelial cells FEW 03/08/2020 05:00 PM  
 Bacteria NEGATIVE  03/08/2020 05:00 PM  
 WBC 0-4 03/08/2020 05:00 PM  
 RBC 0-5 03/08/2020 05:00 PM  
 
Lab Results Component Value Date/Time Culture result: (A) 03/10/2020 07:15 PM  
  HEAVY ANAEROBIC GRAM NEGATIVE RODS BETA LACTAMASE POSITIVE Culture result: LIGHT STAPHYLOCOCCUS AUREUS (A) 03/10/2020 07:15 PM  
 Culture result: SCANT PROTEUS MIRABILIS (A) 03/10/2020 07:15 PM  
 Culture result: (A) 03/10/2020 07:15 PM  
  HEAVY STREPTOCOCCI, BETA HEMOLYTIC GROUP B Penicillin and ampicillin are drugs of choice for treatment of beta-hemolytic streptococcal infections. Susceptibility testing of penicillins and beta-lactams approved by the FDA for treatment of beta-hemolytic streptococcal infections need not be performed routinely, because nonsusceptible isolates are extremely rare. CLSI 2012 Culture result: (A) 03/10/2020 07:15 PM  
  HEAVY STREPTOCOCCI, BETA HEMOLYTIC GROUP G Penicillin and ampicillin are drugs of choice for treatment of beta-hemolytic streptococcal infections. Susceptibility testing of penicillins and beta-lactams approved by the FDA for treatment of beta-hemolytic streptococcal infections need not be performed routinely, because nonsusceptible isolates are extremely rare. CLSI 2012 Lab Results Component Value Date/Time  Specimen Description: NARES 04/09/2014 12:27 PM  
 Specimen Description: REN[place] 10/28/2013 05:00 PM  
 Specimen Description: [place] 10/28/2013 03:09 PM  
 
Lab Results Component Value Date/Time Creatinine, urine 45.70 03/09/2020 09:50 PM  
 
Results from [facility] Encounter encounter on 03/08/20 XR FOOT LT MIN 3 V Narrative EXAM: XR FOOT LT MIN 3 V 
 
INDICATION: L foot swelling; eval for osteo changes. COMPARISON: 3/3/2020. FINDINGS: Three views of the left foot demonstrate no bone erosion, periostitis 
or other evidence of osteomyelitis. There are 2 plantar soft tissue ulcers, 
without foreign body. There is chronic deformity of the distal portion of the second toe and mid foot 
osteoarthrosis. No acute fracture is seen. Arterial calcification is noted. Impression IMPRESSION: No signal change. Plantar soft tissue ulcers. No apparent 
osteomyelitis. RENAL USG 
TECHNIQUE:  
Routine ultrasound images of the kidneys and retroperitoneum were obtained. 
  
FINDINGS:  
The right kidney measures 11.5 cm in length. The left kidney measures 10.7 cm in 
length. There is increased echogenicity of the kidneys bilaterally, consistent with 
medical renal disease. There is thinning of the right renal cortex. No renal 
calculus is seen. There is no hydronephrosis. No renal cyst or solid mass is 
evident.   
  
The abdominal aorta and common iliac arteries were obscured by gas. The 
visualized portion of the IVC appears patent. The urinary bladder demonstrates 
no filling defect.  
  
IMPRESSION IMPRESSION:  
Echogenic kidneys, consistent with medical renal disease. Cortical thinning on 
the right. No hydronephrosis. . 
  
 
                Rafaela Murrieta MD 
[facility] 
 www. [facility].Formerly [facility] [employer] [place] [address], [address] [place], [address] [facility] Phone - [phone] Fax - [phone]

## 2020-03-17 NOTE — PROGRESS NOTES
Problem: Mobility Impaired (Adult and Pediatric) Goal: *Acute Goals and Plan of Care (Insert Text) Description: FUNCTIONAL STATUS PRIOR TO ADMISSION: Patient was modified independent using a rolling walker for functional mobility. Patient required minimal assistance for basic and instrumental ADLs. HOME SUPPORT PRIOR TO ADMISSION: The patient lived with who assists with ADLs and meals. Physical Therapy Goals Initiated 3/16/2020 1. Patient will move from supine to sit and sit to supine , scoot up and down and roll side to side in bed with modified independence within 7 day(s). 2.  Patient will transfer from bed to chair and chair to bed with minimal assistance/contact guard assist using the least restrictive device within 7 day(s). 3.  Patient will perform sit to stand with contact guard assist within 7 day(s). 4.  Patient will ambulate with minimal assistance/contact guard assist for 30 feet with the least restrictive device within 7 day(s). Outcome: Progressing Towards Goal 
 PHYSICAL THERAPY TREATMENT Patient: Tasha Villegas (15 y.o. female) Date: 3/17/2020 Diagnosis: Atrial fibrillation with RVR ([address] [I48.91] Left leg cellulitis [Y63.751] <principal problem not specified> Procedure(s) (LRB): 
SKIN SUBSTITUTE GRAFT APPLICATION LEFT FOOT MISONIX DEBRIDEMENT (INTEGRA GRAFT OR THERASKIN GRAFT 4X4CM) (Left) 3 Days Post-Op Precautions: Fall Chart, physical therapy assessment, plan of care and goals were reviewed. ASSESSMENT Patient continues with skilled PT services and is progressing towards goals. Pt received in supine, required gentle coaxing to participate. No additional clarification of WBing status L LE in physician progress note, however debridement and graft procedure pending. Pt maintained very minimal to no WBing L LE with transfer this date. Able to complete SPT to chair with RW and 1 person assist this date. Pt will benefit from mobility progression in acute setting to enable increased indep and safety. Will continue to assess d/c needs following surgery. Current Level of Function Impacting Discharge (mobility/balance): bed mob SBA, transfer with RW min A Other factors to consider for discharge: modified indep with mobility at baseline, assist available at home PLAN : 
Patient continues to benefit from skilled intervention to address the above impairments. Continue treatment per established plan of care. to address goals. Recommendation for discharge: (in order for the patient to meet his/her long term goals) To be determined: SNF v HH pending additional surgery and possible WBing restrictions This discharge recommendation: 
Has not yet been discussed the attending provider and/or case management IF patient discharges home will need the following DME: to be determined (TBD) SUBJECTIVE:  
Patient stated I've been here so many times; I'm ready to go home.  OBJECTIVE DATA SUMMARY:  
Critical Behavior: 
Neurologic State: Alert, Appropriate for age Orientation Level: Oriented X4 Cognition: Appropriate decision making, Appropriate for age attention/concentration, Appropriate safety awareness, Follows commands Safety/Judgement: Fall prevention, Awareness of environment, Good awareness of safety precautions(Weakness) Functional Mobility Training: 
Bed Mobility: 
  
Supine to Sit: Stand-by assistance; Additional time Scooting: Supervision Transfers: 
Sit to Stand: Minimum assistance(slightly elevated bed height) Bed to Chair: Minimum assistance; Adaptive equipment(bed to chair with RW, min to no WBing L LE, A for balance) Balance: 
Sitting: Intact Standing: Impaired; With support(RW) 
Standing - Static: Fair;Constant support(RW, min to no WBing thru L LE) Standing - Dynamic : Fair;Constant support Pain Rating: 
Pt denies c/o pain Activity Tolerance:  
Good Please refer to the flowsheet for vital signs taken during this treatment. After treatment patient left in no apparent distress:  
Sitting in chair, Heels elevated for pressure relief, Call bell within reach, and RN aware. Alarm pad placed but no box available, RN to obtain box. COMMUNICATION/COLLABORATION:  
The patients plan of care was discussed with: Registered nurse. Shayna Burt, PT Time Calculation: 22 mins

## 2020-03-17 NOTE — DIABETES MGMT
[address] [facility] PROGRAM FOR DIABETES HEALTH 
 
FOLLOW-UP NOTE Presentation This 61 yo WF was admitted 3/8/20 from the ER with complaints of left leg pain, swelling and redness. Also noted to be in afib with RVR. DX: Cellulitis. Treated with ABx. Afebrile. Local treatment. Continues to await surgical procedure as clotting factors not in range. 
  
Diabetes: Patient with known Type 2 diabetes, treated with basal/bolus insulin strategy PTA. A1c 7.5%. Subjective I'm waiting until my INR gets better.  Objective Physical exam 
General Alert, oriented and in no acute distress. Conversant and cooperative. Lying in bed. Vital Signs Visit Vitals /48 Pulse 84 Temp 97.1 °F (36.2 °C) Resp 18 Ht 5' 9\" (1.753 m) Wt 106.7 kg (235 lb 5 oz) SpO2 100% BMI 34.75 kg/m² [facility] Lab Results Component Value Date/Time Hemoglobin A1c 7.5 (H) 03/08/2020 07:09 PM  
 Hemoglobin A1c (POC) 7.2 07/10/2019 03:14 PM  
 
Lab Results Component Value Date/Time LDL, calculated 82 11/11/2019 08:47 AM  
 
Lab Results Component Value Date/Time Creatinine (POC) 1.6 (H) 09/09/2011 08:34 AM  
 Creatinine 2.41 (H) 03/17/2020 12:55 AM  
 
Lab Results Component Value Date/Time Sodium 136 03/17/2020 12:55 AM  
 Potassium 3.4 (L) 03/17/2020 12:55 AM  
 Chloride 103 03/17/2020 12:55 AM  
 CO2 29 03/17/2020 12:55 AM  
 Anion gap 4 (L) 03/17/2020 12:55 AM  
 Glucose 230 (H) 03/17/2020 12:55 AM  
 BUN 36 (H) 03/17/2020 12:55 AM  
 Creatinine 2.41 (H) 03/17/2020 12:55 AM  
 BUN/Creatinine ratio 15 03/17/2020 12:55 AM  
 GFR est AA 25 (L) 03/17/2020 12:55 AM  
 GFR est non-AA 20 (L) 03/17/2020 12:55 AM  
 Calcium 9.2 03/17/2020 12:55 AM  
 Bilirubin, total 0.5 03/08/2020 11:25 AM  
 AST (SGOT) 22 03/08/2020 11:25 AM  
 Alk.  phosphatase 100 03/08/2020 11:25 AM  
 Protein, total 6.2 03/10/2020 01:58 AM  
 Albumin 2.8 (L) 03/08/2020 11:25 AM  
 Globulin 4.7 (H) 03/08/2020 11:25 AM  
 A-G Ratio 0.6 (L) 03/08/2020 11:25 AM  
 ALT (SGPT) 16 03/08/2020 11:25 AM  
 
Lab Results Component Value Date/Time ALT (SGPT) 16 03/08/2020 11:25 AM  
 
 
Blood glucose pattern BGs trending back up without basal insulin X 3 days. Received corrective (11 units 3/16/20), and 4 units so far today. Assessment and Plan Nursing Diagnosis Risk for unstable blood glucose pattern Nursing Intervention Domain 2976 Decision-making Support Nursing Interventions Examined current inpatient diabetes control Explored factors facilitating and impeding inpatient management Evaluation This woman, with known Type 2 diabetes, had achieved inpatient blood glucose target of 100-180mg/dl on home regimen until event on 3/13/20. No basal insulin 3/14/-3/16/20. Now BGs gradually rising into 180-200s. Recommendations Recommend: 
 
Would restart Lantus insulin starting at 10 units D. Billing Code(s) [x] 18178 IP subsequent hospital care - 15 minutes Ramona Finch, CNS Access via MIGUEL [address] [phone]

## 2020-03-17 NOTE — PROGRESS NOTES
Pharmacy Dosing of Warfarin Indication: A. fib Goal INR: 2-3 PTA Warfarin Dose: 4 mg on Sun, Wed and 2 mg on all other days Concurrent anticoagulants: none Concurrent antiplatelet: none Major Interacting Medications Drugs that may increase INR: flagyl; levaquin Drugs that may decrease INR: Phytonadione 2.5 mg on 3/9 Conditions that may increase/decrease INR (CHF, C. diff, cirrhosis, thyroid disorder, hypoalbuminemia): None Labs: 
Recent Labs  
  03/17/20 
0055 03/16/20 
0053 03/15/20 
5061 INR 3.7* 3.7* 2.5* HGB  --  10.6* 9.6* PLT  --  572* 538* Estimated Creatinine Clearance: 32.3 mL/min (A) (based on SCr of 2.41 mg/dL (H)). Impression/Plan:   
INR still surpatherapeutic; Podiatry plans on skin substitute graft with debridement when INR is closer to 2. Continue to hold warfarin INR daily, CBC w/o diff every other day Thank you, Vivi Mejia, PHARMD

## 2020-03-17 NOTE — PROGRESS NOTES
Hospitalist Progress Note NAME: Emeli Looney :  1960 MRN:  [id number] Assessment / Plan: 
Acute Encephalopathy not POA- noted in preop holding 3/14- now resolved Suspect metabolic due to hypoglycemia versus TIA versus Psychiatric event B12= 1403 FA= 85 Ammonia = 25 Stat CT head = Moderate small vessel ischemic changes. Chronic sinus disease. No 
acute intracranial abnormality Examination non focal 
FS 86--> 98 , s/p 1/2 amp D50 in preop holding-- some improvement in mental status but pt still confused- oriented x 1 (person) IP neurology consult appreciated MRI cancelled today as pt back to her normal self now & has PPM 
EEG per neurology if still indicated Off Cefepime now- pt has improved significantly now Cont to hold off Lantus for now & will allow to eat diet so her FS remain up in the mean time. INR therapeutic now (3.7) Left leg cellulitis and ulceration of the left foot 
- Continue with empiric metronidazole+ Levaquin (renlly dosed) - Blood cultures with no growth to date - CT leg noted - Venous US LLE negative for DVT Wound Cx= growing MSSA & sensitive Proteus 
 
- Pain control prn - Elevated limb as needed  
- POD#6 Debridement left foot wound and bone biopsy . Seen by Dr Nydia Cho yesterday- was planned for Graft surgery saturday but pt had new AMS in preop holding 3/14-- procedure cancelled 3/17-- procedure cancelled due to INR 3. 7. will hold warfarin today NWB on L foot noted 
?need for wound vacc indicated 
 
  
  
DM- was hypoglycemic due to poor PO intake noted- FS now running high off lantus Had decreased Lantus to half at 10 units daily 3/13--resume today Holding scheduled Novolog 5 units with meals for now Cont SSI lispro as needed 
  
Atrial fibrillation/RVR 
- s/p Cardizem bolus and remains on Cardizem gtt with good rate control -- now transitioning to oral diltiazem 
- Continue metoprolol - Continue coumadin (hold today dose); pharmacy consult for dosing 
- supra therapeutic resolved initially  INR 3.7 today - Cardiology consult reviewed and appreciated  
 
Elevated troponin - Likely due to demand with rapid atrial fibrillation - Downtrending  
  
CKD4- Cr stable at ~ 2.0 
- Appears to be stable at baseline creatinine - Renally dose medications  
- sodium bicarbonate 650 mg bid - BUMEX 1 mg held as per nephrology - s/p IV iron - Epogen - Nephrology on board. Greatly appreciate input  
  
HTN 
- Continue home medications: hydralazine,Toprol, Cardura , cardizem  
- hold clonidine and  Metolazone HLD 
  
Anxiety  
- Continue Zoloft, Buspar 
  
CHF 
- No exacerbation - On  Bumex  
  
SSS 
- Pacemaker Morbid obesity due to BMI >35 with DM, HTN / Body mass index is 34.75 kg/m². Code status: Full Prophylaxis: anticoagulated with warfarin Recommended Disposition: Home w/Family versus HH/SNF TBD post operatively on monday Subjective: Chief Complaint / Reason for Physician Visit: follow up rapid atrial fibrillation and cellulitis, foot ulcers, AMS now resolved \"I am feeling great\" Review of Systems: 
Symptom Y/N Comments  Symptom Y/N Comments Fever/Chills n   Chest Pain n   
Poor Appetite n   Edema y Cough n   Abdominal Pain n   
Sputum n   Joint Pain SOB/CHOW n   Pruritis/Rash Nausea/vomit n   Tolerating PT/OT Diarrhea    Tolerating Diet Constipation    Other Could NOT obtain due to:   
 
Objective: VITALS:  
Last 24hrs VS reviewed since prior progress note. Most recent are: 
Patient Vitals for the past 24 hrs: 
 Temp Pulse Resp BP SpO2  
03/17/20 1337  84  117/48   
03/17/20 1123 97.1 °F (36.2 °C) 80 18 135/55 100 % 03/17/20 0937  97  141/63   
03/17/20 0745 97.8 °F (36.6 °C) 89 18 145/60 99 % 03/17/20 0321 98.7 °F (37.1 °C) 79 18 136/56 99 % 03/16/20 2354 98.7 °F (37.1 °C) 83 18 130/53 100 % 03/16/20 1910 97.4 °F (36.3 °C) 62 18 104/44 100 % 03/16/20 1431 97.6 °F (36.4 °C) 78 18 109/54 100 % Intake/Output Summary (Last 24 hours) at 3/17/2020 1407 Last data filed at 3/17/2020 0361 Gross per 24 hour Intake 340 ml Output 2300 ml Net -1960 ml PHYSICAL EXAM: 
General: WD, WN. Alert, cooperative, no acute distress   
EENT:  EOMI. Anicteric sclerae. MMM Resp:  CTA bilaterally, no wheezing or rales. No accessory muscle use CV:  Irregularly irregular  rhythm, +LLE edema 1-2+ GI:  Soft, Non distended, Non tender.  +Bowel sounds Neurologic:  Alert and oriented X3 today (back to baseline MS today) Psych:   Good insight. Not anxious nor agitated Skin:  No jaundice. Erythema of distal left lower extremity and foot, with heat and tenderness. Reviewed most current lab test results and cultures  YES Reviewed most current radiology test results   YES Review and summation of old records today    NO Reviewed patient's current orders and MAR    YES 
PMH/SH reviewed - no change compared to H&P 
________________________________________________________________________ Care Plan discussed with: 
  Comments Patient x Family RN x Care Manager x Consultant     
                 x Multidiciplinary team rounds were held today with , nursing, pharmacist and clinical coordinator. Patient's plan of care was discussed; medications were reviewed and discharge planning was addressed. ________________________________________________________________________ Total NON critical care TIME:  36   Minutes Total CRITICAL CARE TIME Spent:   Minutes non procedure based Comments >50% of visit spent in counseling and coordination of care    
________________________________________________________________________ Salvatore Spicer MD  
 
Procedures: see electronic medical records for all procedures/Xrays and details which were not copied into this note but were reviewed prior to creation of Plan. LABS: 
I reviewed today's most current labs and imaging studies. Pertinent labs include: 
Recent Labs  
  03/16/20 
0053 03/15/20 
1317 WBC 12.2* 10.6 HGB 10.6* 9.6* HCT 34.5* 31.6*  
* 538* Recent Labs  
  03/17/20 
0055 03/16/20 
0053 03/15/20 
2756  139 139  
K 3.4* 3.7 4.6  107 108 CO2 29 23 24 * 182* 127* BUN 36* 33* 28* CREA 2.41* 2.19* 2.06* CA 9.2 9.1 8.5 INR 3.7* 3.7* 2.5* Signed: Sharon Bradshaw MD

## 2020-03-17 NOTE — PROGRESS NOTES
NEUROLOGY NOTE Chief Complaint Patient presents with  Leg Swelling  
  leg pain, swelling, and redness x 24 hours; reports that prior to that she had been experiencing fever and chills; hx of DM and cellulitis, but to other leg SUBJECTIVE: 
Pt is back to her baseline EEG is normal 
 
HPI The patient is a 60-year-old woman with initially presented on 3/8/2020 as she was having fever and chills and yesterday she saw that her left leg was swollen and red. She was also in A. fib with RVR with heart rate more than 150 she was found to have left leg cellulitis. She was started on vancomycin and cefepime. The patient did have infected diabetic wound. The patient does also have CKD 3 likely from diabetes nephropathy. Patient did have left foot debridement and bone biopsy from the left fifth metatarsal on 3/10/2020. The work-up showed no evidence of osteomyelitis the exposed bone is high risk for osteomyelitis and the skin substitute graft needs to be placed to cover the bone. On 2/4/2020, the patient was having altered mental status. The patient surgery was delayed. The patient's blood sugar was 86 which improved to 98 and there was some improvement in mental status. The patient is still confused and oriented x1. Patient's blood pressure was 184/82 at around 9 AM. Patient did have a CT scan of the head which showed moderate small vessel ischemic change. ROS A ten system review of constitutional, cardiovascular, respiratory, musculoskeletal, endocrine, skin, SHEENT, genitourinary, psychiatric and neurologic systems was obtained and is unremarkable except as stated in HPI  
 
PMH Past Medical History:  
Diagnosis Date  Acquired lymphedema Right arm  Arrhythmia  Asthma  Atrial fibrillation ([facility] Dr. Euseb[facility]rawal [facility]  Cancer ([facility] bilateral breast  
 Chronic kidney disease  Diabetes mellitus type II   
 Dizziness  Essential hypertension  Hyperlipidemia  Hypertension  Long term (current) use of anticoagulants  Morbid obesity ([facility] 3/14/2012  Nausea & vomiting  Osteoarthritis  Pacemaker  Sick sinus syndrome ([facility] Family History Problem Relation Age of Onset  Cancer Mother  Heart Disease Mother  Alcohol abuse Father  Diabetes Brother  Hypertension Brother [address] Social History Socioeconomic History  Marital status:  Spouse name: Not on file  Number of children: Not on file  Years of education: Not on file  Highest education level: Not on file Tobacco Use  Smoking status: Never Smoker  Smokeless tobacco: Never Used Substance and Sexual Activity  Alcohol use: Yes Comment: rare  Drug use: No  
 
 
ALLERGIES Allergies Allergen Reactions  Ace Inhibitors Cough  Amlodipine Swelling  Avapro [Irbesartan] Unable to Obtain  Bactrim [Sulfamethoxazole-Trimethoprim] Other (comments) Sore mouth  Captopril Cough  Carvedilol Diarrhea [address]  Morphine Nausea and Vomiting and Swelling  Penicillins Hives Tolerated cefepime 1/2020  Pravachol [Pravastatin] Nausea Only  Seafood [Shellfish Containing Products] Hives  Singulair [Montelukast] Other (comments)  
  palpitations PHYSICAL EXAMINATION:  
Patient Vitals for the past 24 hrs: 
 Temp Pulse Resp BP SpO2  
03/17/20 0937  97  141/63   
03/17/20 0745 97.8 °F (36.6 °C) 89 18 145/60 99 % 03/17/20 0321 98.7 °F (37.1 °C) 79 18 136/56 99 % 03/16/20 2354 98.7 °F (37.1 °C) 83 18 130/53 100 % 03/16/20 1910 97.4 °F (36.3 °C) 62 18 104/44 100 % 03/16/20 1431 97.6 °F (36.4 °C) 78 18 109/54 100 % General:  
General appearance: Pt is in no acute distress Distal pulses are preserved Neurological Examination:  
Mental Status:  Awake O x3. Speech is fluent.  Follows commands, has normal fund of knowledge, attention, short term recall, comprehension and insight. Cranial Nerves: Visual fields are full. PERRL, Extraocular movements are full. Facial sensation intact. Facial movement intact. Hearing intact to conversation. Palate elevates symmetrically. Shoulder shrug symmetric. Tongue midline. Motor: Strength is 5/5 in all 4 ext. Normal tone. No atrophy. Sensation: Light touch - Normal 
 
Reflexes: DTRs 1+ in UE and absent in LE Skin: No significant bruising or lacerations. LAB DATA REVIEWED:   
Recent Results (from the past 24 hour(s)) GLUCOSE, POC Collection Time: 03/16/20 11:24 AM  
Result Value Ref Range Glucose (POC) 208 (H) 65 - 100 mg/dL Performed by Octavio Dunaway GLUCOSE, POC Collection Time: 03/16/20  4:29 PM  
Result Value Ref Range Glucose (POC) 252 (H) 65 - 100 mg/dL Performed by Chris Fitzgerald GLUCOSE, POC Collection Time: 03/16/20  8:47 PM  
Result Value Ref Range Glucose (POC) 181 (H) 65 - 100 mg/dL Performed by Yeimi Ambrose METABOLIC PANEL, BASIC Collection Time: 03/17/20 12:55 AM  
Result Value Ref Range Sodium 136 136 - 145 mmol/L Potassium 3.4 (L) 3.5 - 5.1 mmol/L Chloride 103 97 - 108 mmol/L  
 CO2 29 21 - 32 mmol/L Anion gap 4 (L) 5 - 15 mmol/L Glucose 230 (H) 65 - 100 mg/dL BUN 36 (H) 6 - 20 MG/DL Creatinine 2.41 (H) 0.55 - 1.02 MG/DL  
 BUN/Creatinine ratio 15 12 - 20 GFR est AA 25 (L) >60 ml/min/1.73m2 GFR est non-AA 20 (L) >60 ml/min/1.73m2 Calcium 9.2 8.5 - 10.1 MG/DL PROTHROMBIN TIME + INR Collection Time: 03/17/20 12:55 AM  
Result Value Ref Range INR 3.7 (H) 0.9 - 1.1 Prothrombin time 34.4 (H) 9.0 - 11.1 sec GLUCOSE, POC Collection Time: 03/17/20  7:44 AM  
Result Value Ref Range Glucose (POC) 148 (H) 65 - 100 mg/dL Performed by Francia [facility] review: 
CT Head Normal 
 
EEG Normal 
 
HOME MEDS Prior to Admission Medications Prescriptions Last Dose Informant Patient Reported? Taking?  
acetaminophen 500 mg tab 500 mg, diphenhydrAMINE 25 mg cap 25 mg   Yes No  
Sig: Take 2 Doses by mouth nightly. bumetanide (BUMEX) 1 mg tablet  Self Yes No  
Sig: Take 2 mg by mouth daily. busPIRone (BUSPAR) 10 mg tablet  Self No No  
Sig: TAKE ONE TABLET BY MOUTH TWICE A DAY cholecalciferol, VITAMIN D3, (VITAMIN D3) 5,000 unit tab tablet  Self Yes No  
Sig: Take 5,000 Units by mouth daily. cloNIDine HCl (CATAPRES) 0.3 mg tablet  Self Yes No  
Sig: Take 0.6 mg by mouth nightly. cyanocobalamin (VITAMIN B-12) 1,000 mcg sublingual tablet  Self Yes No  
Sig: Take 1,000 mcg by mouth daily. doxazosin (CARDURA) 4 mg tablet  Self No No  
Sig: TAKE ONE TABLET BY MOUTH ONCE NIGHTLY  
hydrALAZINE (APRESOLINE) 100 mg tablet   No No  
Sig: TAKE ONE TABLET BY MOUTH THREE TIMES A DAY  
insulin glargine (LANTUS U-100 INSULIN) 100 unit/mL injection   No No  
Sig: Inject 20 units daily  
insulin lispro (HUMALOG) 100 unit/mL kwikpen  Self Yes No  
Si units with dinner for sugars over 200  
metolazone (ZAROXOLYN) 5 mg tablet  Self No No  
Sig: Take 1 Tab by mouth daily as needed (take if develop increased LE edema, weight gain > 5 pounds. ). metoprolol succinate (TOPROL-XL) 100 mg tablet  Self No No  
Sig: TAKE TWO TABLETS BY MOUTH DAILY potassium chloride SR (KLOR-CON 10) 10 mEq tablet   Yes No  
Sig: Take 10 mEq by mouth daily. Indications: Patient states she intends to take this medication untl she discusses with Dr. Karen Porter on 19.  
sertraline (ZOLOFT) 50 mg tablet   No No  
Sig: TAKE ONE AND ONE-HALF (1 & 1/2) TABLET BY MOUTH DAILY  
vitamin A 8,000 unit capsule  Self Yes No  
Sig: Take 8,000 Units by mouth daily. warfarin (COUMADIN) 4 mg tablet 3/5/2020  Yes Yes Sig: Take 2 mg by mouth five (5) days a week. Take 1 tablet (4 mg) on Sun and Wed and a half tablet (2 mg) the other 5 days. warfarin (COUMADIN) 4 mg tablet 3/4/2020  Yes Yes Sig: Take 4 mg by mouth two (2) times a week. Take 1 tablet (4 mg) on Sun and Wed and a half tablet (2 mg) the other 5 days. Facility-Administered Medications: None CURRENT MEDS Current Facility-Administered Medications Medication Dose Route Frequency  [START ON 3/19/2020] sodium bicarbonate tablet 650 mg  650 mg Oral BID  WARFARIN INFORMATION NOTE (COUMADIN)   Other ONCE  
 levoFLOXacin (LEVAQUIN) 750 mg in D5W IVPB  750 mg IntraVENous Q48H  
 metroNIDAZOLE (FLAGYL) IVPB premix 500 mg  500 mg IntraVENous Q12H  
 alcohol 62% (NOZIN) nasal  1 Ampule  1 Ampule Topical Q12H  
 epoetin viet-epbx (RETACRIT) 12,000 Units combo injection  12,000 Units SubCUTAneous Q MON, WED & FRI  
 docusate sodium (COLACE) capsule 100 mg  100 mg Oral BID  sodium hypochlorite (HALF STRENGTH DAKIN'S) 0.25% irrigation (bottle)   Topical DAILY  dilTIAZem (CARDIZEM) IR tablet 60 mg  60 mg Oral TIDAC  WARFARIN INFORMATION NOTE (COUMADIN)   Other QPM  
 busPIRone (BUSPAR) tablet 10 mg  10 mg Oral DAILY  cholecalciferol (VITAMIN D3) (1000 Units /25 mcg) tablet 5 Tab  5,000 Units Oral DAILY  cyanocobalamin (VITAMIN B12) tablet 1,000 mcg  1,000 mcg Oral DAILY  doxazosin (CARDURA) tablet 4 mg  4 mg Oral DAILY  hydrALAZINE (APRESOLINE) tablet 100 mg  100 mg Oral Q8H  
 metoprolol succinate (TOPROL-XL) XL tablet 100 mg  100 mg Oral DAILY  sertraline (ZOLOFT) tablet 50 mg  50 mg Oral DAILY  sodium chloride (NS) flush 5-40 mL  5-40 mL IntraVENous Q8H  
 insulin lispro (HUMALOG) injection   SubCUTAneous AC&HS IMPRESSION/RECOMMENDATIONS: 
Odette Abrams is a 60 y.o. female who presents with A fib and cellulitis. Negative for osteomyelitis. Pt was supposed to get a skin graft but did have alt mental status. BS is in normal range. Non focal Exam. Pt is back to her baseline. Suspect this to be metabolic ? cefepime related. No further neuro gar. Call with questions.   
 
 
Cathy Arthur MD 
Neurologist

## 2020-03-18 LAB
ANION GAP SERPL CALC-SCNC: 5 MMOL/L (ref 5–15)
BASOPHILS # BLD: 0.1 K/UL (ref 0–0.1)
BASOPHILS NFR BLD: 1 % (ref 0–1)
BUN SERPL-MCNC: 36 MG/DL (ref 6–20)
BUN/CREAT SERPL: 15 (ref 12–20)
CALCIUM SERPL-MCNC: 9 MG/DL (ref 8.5–10.1)
CHLORIDE SERPL-SCNC: 105 MMOL/L (ref 97–108)
CO2 SERPL-SCNC: 26 MMOL/L (ref 21–32)
CREAT SERPL-MCNC: 2.38 MG/DL (ref 0.55–1.02)
DIFFERENTIAL METHOD BLD: ABNORMAL
EOSINOPHIL # BLD: 0.1 K/UL (ref 0–0.4)
EOSINOPHIL NFR BLD: 1 % (ref 0–7)
ERYTHROCYTE [DISTWIDTH] IN BLOOD BY AUTOMATED COUNT: 16.2 % (ref 11.5–14.5)
GLUCOSE BLD STRIP.AUTO-MCNC: 139 MG/DL (ref 65–100)
GLUCOSE BLD STRIP.AUTO-MCNC: 170 MG/DL (ref 65–100)
GLUCOSE BLD STRIP.AUTO-MCNC: 187 MG/DL (ref 65–100)
GLUCOSE BLD STRIP.AUTO-MCNC: 298 MG/DL (ref 65–100)
GLUCOSE SERPL-MCNC: 148 MG/DL (ref 65–100)
HCT VFR BLD AUTO: 30 % (ref 35–47)
HGB BLD-MCNC: 9.2 G/DL (ref 11.5–16)
IMM GRANULOCYTES # BLD AUTO: 0.1 K/UL (ref 0–0.04)
IMM GRANULOCYTES NFR BLD AUTO: 1 % (ref 0–0.5)
INR PPP: 4.4 (ref 0.9–1.1)
LYMPHOCYTES # BLD: 1.1 K/UL (ref 0.8–3.5)
LYMPHOCYTES NFR BLD: 11 % (ref 12–49)
MCH RBC QN AUTO: 29 PG (ref 26–34)
MCHC RBC AUTO-ENTMCNC: 30.7 G/DL (ref 30–36.5)
MCV RBC AUTO: 94.6 FL (ref 80–99)
MONOCYTES # BLD: 1.4 K/UL (ref 0–1)
MONOCYTES NFR BLD: 13 % (ref 5–13)
NEUTS SEG # BLD: 7.8 K/UL (ref 1.8–8)
NEUTS SEG NFR BLD: 73 % (ref 32–75)
NRBC # BLD: 0 K/UL (ref 0–0.01)
NRBC BLD-RTO: 0 PER 100 WBC
PLATELET # BLD AUTO: 520 K/UL (ref 150–400)
PMV BLD AUTO: 9.9 FL (ref 8.9–12.9)
POTASSIUM SERPL-SCNC: 3.4 MMOL/L (ref 3.5–5.1)
PROTHROMBIN TIME: 40.9 SEC (ref 9–11.1)
RBC # BLD AUTO: 3.17 M/UL (ref 3.8–5.2)
SERVICE CMNT-IMP: ABNORMAL
SODIUM SERPL-SCNC: 136 MMOL/L (ref 136–145)
WBC # BLD AUTO: 10.5 K/UL (ref 3.6–11)

## 2020-03-18 PROCEDURE — 74011636637 HC RX REV CODE- 636/637: Performed by: PODIATRIST

## 2020-03-18 PROCEDURE — 85610 PROTHROMBIN TIME: CPT

## 2020-03-18 PROCEDURE — 36415 COLL VENOUS BLD VENIPUNCTURE: CPT

## 2020-03-18 PROCEDURE — 85025 COMPLETE CBC W/AUTO DIFF WBC: CPT

## 2020-03-18 PROCEDURE — 74011250637 HC RX REV CODE- 250/637: Performed by: PODIATRIST

## 2020-03-18 PROCEDURE — 80048 BASIC METABOLIC PNL TOTAL CA: CPT

## 2020-03-18 PROCEDURE — 74011250637 HC RX REV CODE- 250/637: Performed by: INTERNAL MEDICINE

## 2020-03-18 PROCEDURE — 82962 GLUCOSE BLOOD TEST: CPT

## 2020-03-18 PROCEDURE — 94760 N-INVAS EAR/PLS OXIMETRY 1: CPT

## 2020-03-18 PROCEDURE — 65660000000 HC RM CCU STEPDOWN

## 2020-03-18 PROCEDURE — 74011250636 HC RX REV CODE- 250/636: Performed by: INTERNAL MEDICINE

## 2020-03-18 RX ORDER — HYDRALAZINE HYDROCHLORIDE 50 MG/1
50 TABLET, FILM COATED ORAL EVERY 8 HOURS
Status: DISCONTINUED | OUTPATIENT
Start: 2020-03-19 | End: 2020-03-22 | Stop reason: HOSPADM

## 2020-03-18 RX ORDER — DOXAZOSIN 2 MG/1
4 TABLET ORAL
Status: DISCONTINUED | OUTPATIENT
Start: 2020-03-19 | End: 2020-03-22 | Stop reason: HOSPADM

## 2020-03-18 RX ADMIN — Medication 10 ML: at 04:02

## 2020-03-18 RX ADMIN — INSULIN LISPRO 5 UNITS: 100 INJECTION, SOLUTION INTRAVENOUS; SUBCUTANEOUS at 09:23

## 2020-03-18 RX ADMIN — CYANOCOBALAMIN TAB 500 MCG 1000 MCG: 500 TAB at 09:22

## 2020-03-18 RX ADMIN — DOXAZOSIN 4 MG: 2 TABLET ORAL at 09:25

## 2020-03-18 RX ADMIN — METRONIDAZOLE 500 MG: 500 INJECTION, SOLUTION INTRAVENOUS at 22:09

## 2020-03-18 RX ADMIN — METRONIDAZOLE 500 MG: 500 INJECTION, SOLUTION INTRAVENOUS at 09:26

## 2020-03-18 RX ADMIN — MELATONIN 5 TABLET: at 09:22

## 2020-03-18 RX ADMIN — Medication 10 ML: at 16:23

## 2020-03-18 RX ADMIN — Medication 10 ML: at 22:10

## 2020-03-18 RX ADMIN — DILTIAZEM HYDROCHLORIDE 60 MG: 60 TABLET, FILM COATED ORAL at 11:55

## 2020-03-18 RX ADMIN — DILTIAZEM HYDROCHLORIDE 60 MG: 60 TABLET, FILM COATED ORAL at 16:22

## 2020-03-18 RX ADMIN — BUSPIRONE HYDROCHLORIDE 10 MG: 10 TABLET ORAL at 09:22

## 2020-03-18 RX ADMIN — POTASSIUM BICARBONATE 40 MEQ: 782 TABLET, EFFERVESCENT ORAL at 09:24

## 2020-03-18 RX ADMIN — DILTIAZEM HYDROCHLORIDE 60 MG: 60 TABLET, FILM COATED ORAL at 06:41

## 2020-03-18 RX ADMIN — HYDRALAZINE HYDROCHLORIDE 100 MG: 50 TABLET, FILM COATED ORAL at 06:41

## 2020-03-18 RX ADMIN — METOPROLOL SUCCINATE 100 MG: 50 TABLET, EXTENDED RELEASE ORAL at 09:24

## 2020-03-18 RX ADMIN — INSULIN LISPRO 2 UNITS: 100 INJECTION, SOLUTION INTRAVENOUS; SUBCUTANEOUS at 11:55

## 2020-03-18 RX ADMIN — Medication 1 AMPULE: at 11:55

## 2020-03-18 RX ADMIN — ACETAMINOPHEN 650 MG: 325 TABLET ORAL at 16:22

## 2020-03-18 RX ADMIN — PHYTONADIONE 2.5 MG: 10 INJECTION, EMULSION INTRAMUSCULAR; INTRAVENOUS; SUBCUTANEOUS at 20:14

## 2020-03-18 RX ADMIN — SERTRALINE HYDROCHLORIDE 50 MG: 50 TABLET ORAL at 09:24

## 2020-03-18 RX ADMIN — HYOSCYAMINE SULFATE: 16 SOLUTION at 16:23

## 2020-03-18 NOTE — PROGRESS NOTES
TEE: 
1. Home health vs. SNF  
2. OP f/u appts - PCP, Podiatry, Nephrology 3. Possible wound vac and home IV ABX Pt transferred to room 2210. [facility] Disposition pending until after graft procedure is completed. Procedure on hold due to elevated INR, per Podiatry. PT/OT continue to work with pt to assist in determining d/c needs. At the very least pt will benefit from home health. It is to be determined whether pt will require a wound vac of home infusion services. Carmencita Maldonado, MSW Care Manager [phone]

## 2020-03-18 NOTE — PROGRESS NOTES
Podiatry Subjective: Pt in bed, no new complaints. Reduced left foot pain. Patient Active Problem List  
 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 ([address] 03/03/2020  Traumatic hematoma of foot, left, initial encounter 02/25/2020  Type 2 diabetes mellitus with left diabetic foot infection ([address] 10/29/2019  Foot deformity, acquired, left 09/24/2019  Foot deformity, right 09/24/2019  Pes cavus 09/24/2019  Cellulitis of right lower extremity 08/09/2019  Diabetic ulcer of right midfoot associated with type 2 diabetes mellitus, with fat layer exposed ([address] 08/06/2019  Diabetic ulcer of left heel associated with type 2 diabetes mellitus, with fat layer exposed ([address] 06/21/2019  Open breast wound, left, initial encounter 06/07/2019  Venous stasis ulcer of right lower leg with edema of right lower leg (HCC) 11/14/2018  Diabetic polyneuropathy associated with type 2 diabetes mellitus ([address] 11/13/2018  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, associated with type 2 diabetes mellitus ([address] 04/25/2018  Morbid obesity with BMI of 40.0-44.9, adult ([address] 05/01/2017  Controlled type 2 diabetes mellitus with stage 4 chronic kidney disease, with long-term current use of insulin ([address] 01/16/2017  PAF (paroxysmal atrial fibrillation) ([address] 11/28/2016  Anemia in stage 4 chronic kidney disease ([address] 11/28/2016  Essential hypertension 08/26/2016  Systolic murmur 07/19/1025  CKD (chronic kidney disease), stage IV ([address] 06/09/2012  Mixed hyperlipidemia 05/22/2012  Long term (current) use of anticoagulants 04/11/2012  S/P cardiac pacemaker procedure 04/03/2012  Sick sinus syndrome ([facility] 04/02/2012  Status post ablation of atrial fibrillation 12/15/2011  Fe deficiency anemia 04/14/2010  
 History of breast cancer 04/13/2010  Asthma 04/13/2010 Past Medical History:  
Diagnosis Date  Acquired lymphedema Right arm  Arrhythmia  Asthma  Atrial fibrillation ([address] Dr. Lakisha Keith and Dr. Dale Fleming [facility])  Cancer ([address] bilateral breast  
 Chronic kidney disease  Diabetes mellitus type II   
 Dizziness  Essential hypertension  Hyperlipidemia  Hypertension  Long term (current) use of anticoagulants  Morbid obesity ([facility] 3/14/2012  Nausea & vomiting  Osteoarthritis  Pacemaker  Sick sinus syndrome ([address] Past Surgical History:  
Procedure Laterality Date  ABDOMEN SURGERY PROC UNLISTED    
 gastric bypass  GASTRIC BYPASS,OBESE<150CM SAW-EN-Y  7/08  
 [facility]  HX AFIB ABLATION    
 HX CARPAL TUNNEL RELEASE [address] [id number] [address] RIGHT MODIFIED RADICAL   
 HX MOHS PROCEDURES  1995  
 right  HX ORTHOPAEDIC Right   
 knee surgery  HX PACEMAKER    
 IR INSERT TUNL CVC W PORT OVER 5 YEARS    
 LAMINECTOMY,CERVICAL  1994  
 NEEDLE BIOPSY LIVER,W OTHR [address]  8.21.07  
 [place] [address] AND 1994  [place] TRABECULOPLASTY BY LASER SURGERY    
 US GUIDE ASP OVARIAN CYST ABD APPROACH  8.21.07  
 RIGHT Family History Problem Relation Age of Onset  Cancer Mother  Heart Disease Mother  Alcohol abuse Father  Diabetes Brother  Hypertension Brother Social History Tobacco Use  Smoking status: Never Smoker  Smokeless tobacco: Never Used Substance Use Topics  Alcohol use: Yes Comment: rare Allergies Allergen Reactions  Ace Inhibitors Cough  Amlodipine Swelling  Avapro [Irbesartan] Unable to Obtain  Bactrim [Sulfamethoxazole-Trimethoprim] Other (comments) Sore mouth  Captopril Cough  Carvedilol Diarrhea [address]  Morphine Nausea and Vomiting and Swelling  Penicillins Hives Tolerated cefepime 1/2020  Pravachol [Pravastatin] Nausea Only  Seafood [Shellfish Containing Products] Hives  Singulair [Montelukast] Other (comments)  
  palpitations Prior to Admission medications Medication Sig Start Date End Date Taking? Authorizing Provider  
warfarin (COUMADIN) 4 mg tablet Take 2 mg by mouth five (5) days a week. Take 1 tablet (4 mg) on Sun and Wed and a half tablet (2 mg) the other 5 days. Yes Provider, Historical  
warfarin (COUMADIN) 4 mg tablet Take 4 mg by mouth two (2) times a week. Take 1 tablet (4 mg) on Sun and Wed and a half tablet (2 mg) the other 5 days. Yes Provider, Historical  
acetaminophen 500 mg tab 500 mg, diphenhydrAMINE 25 mg cap 25 mg Take 2 Doses by mouth nightly. Provider, Historical  
potassium chloride SR (KLOR-CON 10) 10 mEq tablet Take 10 mEq by mouth daily. Indications: Patient states she intends to take this medication untl she discusses with Dr. Duane Call on 12-12-19. Provider, Historical  
insulin glargine (LANTUS U-100 INSULIN) 100 unit/mL injection Inject 20 units daily 11/10/19   Ghulam Mas MD  
hydrALAZINE (APRESOLINE) 100 mg tablet TAKE ONE TABLET BY MOUTH THREE TIMES A DAY 10/21/19   Ghulam Mas MD  
sertraline (ZOLOFT) 50 mg tablet TAKE ONE AND ONE-HALF (1 & 1/2) TABLET BY MOUTH DAILY 8/22/19   Ghulam Mas MD  
bumetanide (BUMEX) 1 mg tablet Take 2 mg by mouth daily. Provider, Historical  
cloNIDine HCl (CATAPRES) 0.3 mg tablet Take 0.6 mg by mouth nightly.     Provider, Historical  
metoprolol succinate (TOPROL-XL) 100 mg tablet TAKE TWO TABLETS BY MOUTH DAILY 7/12/19   Ghulam Mas MD  
doxazosin (CARDURA) 4 mg tablet TAKE ONE TABLET BY MOUTH ONCE NIGHTLY 19   Gini Russell MD  
busPIRone (BUSPAR) 10 mg tablet TAKE ONE TABLET BY MOUTH TWICE A DAY 19   Gini Russell MD  
metolazone (ZAROXOLYN) 5 mg tablet Take 1 Tab by mouth daily as needed (take if develop increased LE edema, weight gain > 5 pounds. ). 4/10/14   Rosina Bagley NP  
cholecalciferol, VITAMIN D3, (VITAMIN D3) 5,000 unit tab tablet Take 5,000 Units by mouth daily. Provider, Historical  
vitamin A 8,000 unit capsule Take 8,000 Units by mouth daily. Provider, Historical  
insulin lispro (HUMALOG) 100 unit/mL kwikpen 2 units with dinner for sugars over 200 1/3/14   Gini Russell MD  
cyanocobalamin (VITAMIN B-12) 1,000 mcg sublingual tablet Take 1,000 mcg by mouth daily. Provider, Historical  
 
 
Review of Systems AO x3. NAD. Objective:  
 
Patient Vitals for the past 8 hrs: 
 BP Temp Pulse Resp SpO2  
20 2344 134/57 98 °F (36.7 °C) 81 18 100 % 20 2024 130/62 98.1 °F (36.7 °C) 78 18 98 % 20 1852 137/58  79   Temp (24hrs), Av °F (36.7 °C), Min:97.1 °F (36.2 °C), Max:98.7 °F (37.1 °C) Physical Exam  Lower Extremity Dressings removed. Better moisture wounds left foot. Light purulent drainage plantar wound left foot. Little to no erythema or edema rest of left foot and ankle. DP and PT 2/4 Sensation intact to light touch. Data Review:  
Recent Results (from the past 24 hour(s)) METABOLIC PANEL, BASIC Collection Time: 20 12:55 AM  
Result Value Ref Range Sodium 136 136 - 145 mmol/L Potassium 3.4 (L) 3.5 - 5.1 mmol/L Chloride 103 97 - 108 mmol/L  
 CO2 29 21 - 32 mmol/L Anion gap 4 (L) 5 - 15 mmol/L Glucose 230 (H) 65 - 100 mg/dL BUN 36 (H) 6 - 20 MG/DL Creatinine 2.41 (H) 0.55 - 1.02 MG/DL  
 BUN/Creatinine ratio 15 12 - 20 GFR est AA 25 (L) >60 ml/min/1.73m2 GFR est non-AA 20 (L) >60 ml/min/1.73m2 Calcium 9.2 8.5 - 10.1 MG/DL PROTHROMBIN TIME + INR  
 Collection Time: 03/17/20 12:55 AM  
Result Value Ref Range INR 3.7 (H) 0.9 - 1.1 Prothrombin time 34.4 (H) 9.0 - 11.1 sec GLUCOSE, POC Collection Time: 03/17/20  7:44 AM  
Result Value Ref Range Glucose (POC) 148 (H) 65 - 100 mg/dL Performed by Danton Call GLUCOSE, POC Collection Time: 03/17/20 11:29 AM  
Result Value Ref Range Glucose (POC) 193 (H) 65 - 100 mg/dL Performed by Danton Call GLUCOSE, POC Collection Time: 03/17/20  5:48 PM  
Result Value Ref Range Glucose (POC) 211 (H) 65 - 100 mg/dL Performed by Florencia Lew GLUCOSE, POC Collection Time: 03/17/20 10:16 PM  
Result Value Ref Range Glucose (POC) 147 (H) 65 - 100 mg/dL Performed by Tanvi Vogel (PCT) Impression:  
Cellulitis left foot, resolving Local infection of left diabetic foot ulcer Recommendation:  
INR still 3.7, waiting for it to trend down and then I can extrapolate a date for additional debridement and grafting.

## 2020-03-18 NOTE — PROGRESS NOTES
TEE: 
1. Home health vs. SNF  
2. OP f/u appts - PCP, Podiatry, Nephrology 3. Possible wound vac and home IV ABX 
 
10:30am-CM met with pt to discuss d/c plan. CM introduced self and role. No new needs at this time. CM will continue to follow pt for d/c planning needs. Gene Graham [address] 
[phone]

## 2020-03-18 NOTE — PROGRESS NOTES
Nutrition Assessment: 
 
INTERVENTIONS/RECOMMENDATIONS:  
Meals/Snacks: General/healthful diet:  Continue CCD for BG management/coordination of meals with meds. Supplements: Commercial supplement:  RD cancelled Ensure Pudding; ordered Glucerna Shakes. ASSESSMENT:  
3/18:  Chart reviewed; med noted for acute encephalopathy on admission which has resolved, left leg cellulitis and ulceration of left foot, POD#6 for debridement. Pt reports decreased appetite as foods are not seasoned to her usual taste. Offered to send herb seasoning packets and margarine on tray to assist with flavor. Pt is room service and able to select meals. Pt declines ensure pudding but receptive to glucerna shakes although she does not prefer any nutritional supplements. With increased needs to support wound healing and poor po, reinforced the importance of consuming nutritional shakes; pt agreeable. Diet Order: Consistent carb 
% Eaten:   
Patient Vitals for the past 72 hrs: 
 % Diet Eaten 03/18/20 1155 35 % 03/18/20 0846 40 % Pertinent Medications: [x] Reviewed []Other: 
Pertinent Labs: [x]Reviewed  []Other: -921 Food Allergies: [x]None []Other:    
Last BM: 3/12   [x]Active     []Hyperactive  []Hypoactive       [] Absent  BS Skin:    [] Intact   [] Incision  [] Breakdown   []Edema   [x]Other: Anthropometrics: Height: 5' 9\" (175.3 cm) Weight: 107.5 kg (237 lb) IBW (%IBW):   ( ) UBW (%UBW):   (  %) BMI: Body mass index is 35 kg/m². This BMI is indicative of: 
[]Underweight   []Normal   []Overweight   [x] Obesity   [] Extreme Obesity (BMI>40) Last Weight Metrics: 
Weight Loss Metrics 3/18/2020 1/24/2020 1/14/2020 12/12/2019 12/6/2019 11/13/2019 11/1/2019 Today's Wt 237 lb 246 lb 250 lb 3.6 oz 263 lb 251 lb 1.7 oz 254 lb 8 oz -  
BMI 35 kg/m2 35.3 kg/m2 35.9 kg/m2 38.84 kg/m2 37.08 kg/m2 37.58 kg/m2 37.51 kg/m2 Estimated Nutrition Needs (Based on): 2119 Kcals/day(BMR (1846) x 1. 2AF) , 113 g(1.0 g/kg bw) Protein Carbohydrate: At Least 130 g/day  Fluids: 2100 mL/day NUTRITION DIAGNOSES:  
Problem:  Inadequate oral intake Etiology: related to decreased appetite Signs/Symptoms: as evidenced by PO 25% of meals, ONS added Previous Nutrition Dx:  [] Resolved  [] Unresolved           [x] Progressing NUTRITION INTERVENTIONS: 
Meals/Snacks: General/healthful diet   Supplements: Commercial supplement GOAL:  
PO intake trend >50% of meals next 3-5 days NUTRITION MONITORING AND EVALUATION Food/Nutrient Intake Outcomes: Total energy intake Physical Signs/Symptoms Outcomes: Weight/weight change, Glucose profile Previous Goal Met: 
 [] Met              [x] Progressing Towards Goal              [] Not Progressing Towards Goal  
Previous Recommendations: 
 [x] Implemented          [] Not Implemented          [] Not Applicable LEARNING NEEDS (Diet, Food/Nutrient-Drug Interaction):  
 [x] None Identified 
 [] Identified and Education Provided/Documented 
 [] Identified and Pt declined/was not appropriate Cultural, Moravian, OR Ethnic Dietary Needs:  
 [x] None Identified 
 [] Identified and Addressed 
 
 [x] Interdisciplinary Care Plan Reviewed/Documented  
 [x] Discharge Planning:  Continue CCD for BG management 
 [] Participated in Interdisciplinary Rounds NUTRITION RISK:  
 [x] Patient At Nutritional Risk       [] Patient Not At Nutritional Risk Sindhu Soriano RD Pager [phone] Weekend Pager [phone]

## 2020-03-18 NOTE — PROGRESS NOTES
Podiatry Subjective: Pt resting in bed. No new complaints. Patient is a 60 y.o.  female who is being seen for diabetic ulcer left foot. Patient Active Problem List  
 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 ([address] 03/03/2020  Traumatic hematoma of foot, left, initial encounter 02/25/2020  Type 2 diabetes mellitus with left diabetic foot infection ([address] 10/29/2019  Foot deformity, acquired, left 09/24/2019  Foot deformity, right 09/24/2019  Pes cavus 09/24/2019  Cellulitis of right lower extremity 08/09/2019  Diabetic ulcer of right midfoot associated with type 2 diabetes mellitus, with fat layer exposed ([address] 08/06/2019  Diabetic ulcer of left heel associated with type 2 diabetes mellitus, with fat layer exposed ([address] 06/21/2019  Open breast wound, left, initial encounter 06/07/2019  Venous stasis ulcer of right lower leg with edema of right lower leg (HCC) 11/14/2018  Diabetic polyneuropathy associated with type 2 diabetes mellitus ([address] 11/13/2018  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, associated with type 2 diabetes mellitus ([address] 04/25/2018  Morbid obesity with BMI of 40.0-44.9, adult ([address] 05/01/2017  Controlled type 2 diabetes mellitus with stage 4 chronic kidney disease, with long-term current use of insulin ([address] 01/16/2017  PAF (paroxysmal atrial fibrillation) ([address] 11/28/2016  Anemia in stage 4 chronic kidney disease ([address] 11/28/2016  Essential hypertension 08/26/2016  Systolic murmur 72/49/5540  CKD (chronic kidney disease), stage IV ([address] 06/09/2012  Mixed hyperlipidemia 05/22/2012  Long term (current) use of anticoagulants 04/11/2012  S/P cardiac pacemaker procedure 04/03/2012  Sick sinus syndrome ([facility] 04/02/2012  Status post ablation of atrial fibrillation 12/15/2011  Fe deficiency anemia 04/14/2010  
 History of breast cancer 04/13/2010  Asthma 04/13/2010 Past Medical History:  
Diagnosis Date  Acquired lymphedema Right arm  Arrhythmia  Asthma  Atrial fibrillation ([facility]  Cancer ([facility] bilateral breast  
 Chronic kidney disease  Diabetes mellitus type II   
 Dizziness  Essential hypertension  Hyperlipidemia  Hypertension  Long term (current) use of anticoagulants  Morbid obesity ([facility] 3/14/2012  Nausea & vomiting  Osteoarthritis  Pacemaker  Sick sinus syndrome ([facility] Past Surgical History:  
Procedure Laterality Date  ABDOMEN SURGERY PROC UNLISTED    
 gastric bypass  GASTRIC BYPASS,OBESE<150CM SAW-EN-Y  7/08  
 [facility]  HX AFIB ABLATION    
 HX CARPAL TUNNEL RELEASE [address] RIGHT MODIFIED RADICAL   
 HX MOHS PROCEDURES  1995  
 right  HX ORTHOPAEDIC Right   
 knee surgery  HX PACEMAKER    
 IR INSERT TUNL CVC W PORT OVER 5 YEARS    
 LAMINECTOMY,CERVICAL  1994  
 NEEDLE BIOPSY LIVER,W OTHR [address]  8.21.07  
 [place] [address] AND 1994  [place] TRABECULOPLASTY BY LASER SURGERY    
 US GUIDE ASP OVARIAN CYST ABD APPROACH  8.21.07  
 RIGHT Family History Problem Relation Age of Onset  Cancer Mother  Heart Disease Mother  Alcohol abuse Father  Diabetes Brother  Hypertension Brother Social History Tobacco Use  Smoking status: Never Smoker  Smokeless tobacco: Never Used Substance Use Topics  Alcohol use: Yes Comment: rare Allergies Allergen Reactions  Ace Inhibitors Cough  Amlodipine Swelling  Avapro [Irbesartan] Unable to Obtain  Bactrim [Sulfamethoxazole-Trimethoprim] Other (comments) Sore mouth  Captopril Cough  Carvedilol Diarrhea [address]  Morphine Nausea and Vomiting and Swelling  Penicillins Hives Tolerated cefepime 1/2020  Pravachol [Pravastatin] Nausea Only  Seafood [Shellfish Containing Products] Hives  Singulair [Montelukast] Other (comments)  
  palpitations Prior to Admission medications Medication Sig Start Date End Date Taking? Authorizing Provider  
warfarin (COUMADIN) 4 mg tablet Take 2 mg by mouth five (5) days a week. Take 1 tablet (4 mg) on Sun and Wed and a half tablet (2 mg) the other 5 days. Yes Provider, Historical  
warfarin (COUMADIN) 4 mg tablet Take 4 mg by mouth two (2) times a week. Take 1 tablet (4 mg) on Sun and Wed and a half tablet (2 mg) the other 5 days. Yes Provider, Historical  
acetaminophen 500 mg tab 500 mg, diphenhydrAMINE 25 mg cap 25 mg Take 2 Doses by mouth nightly. Provider, Historical  
potassium chloride SR (KLOR-CON 10) 10 mEq tablet Take 10 mEq by mouth daily. Indications: Patient states she intends to take this medication untl she discusses with Dr. Karen Porter on 12-12-19. Provider, Historical  
insulin glargine (LANTUS U-100 INSULIN) 100 unit/mL injection Inject 20 units daily 11/10/19   Trisha Craft MD  
hydrALAZINE (APRESOLINE) 100 mg tablet TAKE ONE TABLET BY MOUTH THREE TIMES A DAY 10/21/19   Trisha Craft MD  
sertraline (ZOLOFT) 50 mg tablet TAKE ONE AND ONE-HALF (1 & 1/2) TABLET BY MOUTH DAILY 8/22/19   Trisha Craft MD  
bumetanide (BUMEX) 1 mg tablet Take 2 mg by mouth daily. Provider, Historical  
cloNIDine HCl (CATAPRES) 0.3 mg tablet Take 0.6 mg by mouth nightly.     Provider, Historical  
metoprolol succinate (TOPROL-XL) 100 mg tablet TAKE TWO TABLETS BY MOUTH DAILY 19   Mayra Greer MD  
doxazosin (CARDURA) 4 mg tablet TAKE ONE TABLET BY MOUTH ONCE NIGHTLY 19   Mayra Greer MD  
busPIRone (BUSPAR) 10 mg tablet TAKE ONE TABLET BY MOUTH TWICE A DAY 19   Mayra Greer MD  
metolazone (ZAROXOLYN) 5 mg tablet Take 1 Tab by mouth daily as needed (take if develop increased LE edema, weight gain > 5 pounds. ). 4/10/14   Lupe Chow, MARCEL  
cholecalciferol, VITAMIN D3, (VITAMIN D3) 5,000 unit tab tablet Take 5,000 Units by mouth daily. Provider, Historical  
vitamin A 8,000 unit capsule Take 8,000 Units by mouth daily. Provider, Historical  
insulin lispro (HUMALOG) 100 unit/mL kwikpen 2 units with dinner for sugars over 200 1/3/14   Mayra Greer MD  
cyanocobalamin (VITAMIN B-12) 1,000 mcg sublingual tablet Take 1,000 mcg by mouth daily. Provider, Historical  
 
 
Review of Systems AO x3 NAD. Objective:  
 
Patient Vitals for the past 8 hrs: 
 BP Temp Pulse Resp SpO2  
20 1934 122/51 97.9 °F (36.6 °C) 67 18 98 % 20 1507 136/63 97.8 °F (36.6 °C) 74 18 100 % Temp (24hrs), Av.1 °F (36.7 °C), Min:97.8 °F (36.6 °C), Max:98.4 °F (36.9 °C) Physical Exam Lower Extremity Dressings left foot clean, dry and intact. These were recently changed and so not removed. CFT less than  3 seconds left foot Sensation intact to touch INR 4.4 Data Review:  
Recent Results (from the past 24 hour(s)) GLUCOSE, POC Collection Time: 20 10:16 PM  
Result Value Ref Range Glucose (POC) 147 (H) 65 - 100 mg/dL Performed by Wild Bañuelos (PCT) METABOLIC PANEL, BASIC Collection Time: 20  4:01 AM  
Result Value Ref Range Sodium 136 136 - 145 mmol/L Potassium 3.4 (L) 3.5 - 5.1 mmol/L Chloride 105 97 - 108 mmol/L  
 CO2 26 21 - 32 mmol/L Anion gap 5 5 - 15 mmol/L Glucose 148 (H) 65 - 100 mg/dL BUN 36 (H) 6 - 20 MG/DL  Creatinine 2.38 (H) 0.55 - 1.02 MG/DL  
 BUN/Creatinine ratio 15 12 - 20 GFR est AA 25 (L) >60 ml/min/1.73m2 GFR est non-AA 21 (L) >60 ml/min/1.73m2 Calcium 9.0 8.5 - 10.1 MG/DL PROTHROMBIN TIME + INR Collection Time: 03/18/20  4:01 AM  
Result Value Ref Range INR 4.4 (H) 0.9 - 1.1 Prothrombin time 40.9 (H) 9.0 - 11.1 sec CBC WITH AUTOMATED DIFF Collection Time: 03/18/20  4:01 AM  
Result Value Ref Range WBC 10.5 3.6 - 11.0 K/uL  
 RBC 3.17 (L) 3.80 - 5.20 M/uL HGB 9.2 (L) 11.5 - 16.0 g/dL HCT 30.0 (L) 35.0 - 47.0 % MCV 94.6 80.0 - 99.0 FL  
 MCH 29.0 26.0 - 34.0 PG  
 MCHC 30.7 30.0 - 36.5 g/dL  
 RDW 16.2 (H) 11.5 - 14.5 % PLATELET 719 (H) 293 - 400 K/uL MPV 9.9 8.9 - 12.9 FL  
 NRBC 0.0 0  WBC ABSOLUTE NRBC 0.00 0.00 - 0.01 K/uL NEUTROPHILS 73 32 - 75 % LYMPHOCYTES 11 (L) 12 - 49 % MONOCYTES 13 5 - 13 % EOSINOPHILS 1 0 - 7 % BASOPHILS 1 0 - 1 % IMMATURE GRANULOCYTES 1 (H) 0.0 - 0.5 % ABS. NEUTROPHILS 7.8 1.8 - 8.0 K/UL  
 ABS. LYMPHOCYTES 1.1 0.8 - 3.5 K/UL  
 ABS. MONOCYTES 1.4 (H) 0.0 - 1.0 K/UL  
 ABS. EOSINOPHILS 0.1 0.0 - 0.4 K/UL  
 ABS. BASOPHILS 0.1 0.0 - 0.1 K/UL  
 ABS. IMM. GRANS. 0.1 (H) 0.00 - 0.04 K/UL  
 DF AUTOMATED    
GLUCOSE, POC Collection Time: 03/18/20  8:03 AM  
Result Value Ref Range Glucose (POC) 298 (H) 65 - 100 mg/dL Performed by Delfino Barnes GLUCOSE, POC Collection Time: 03/18/20 10:52 AM  
Result Value Ref Range Glucose (POC) 170 (H) 65 - 100 mg/dL Performed by Delfino Barnes GLUCOSE, POC Collection Time: 03/18/20  3:05 PM  
Result Value Ref Range Glucose (POC) 139 (H) 65 - 100 mg/dL Performed by Delfino Barnes Impression:  
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  
INR still rising despite stopping warfarin, likely due to the levaquin. Primary Care Team has ordered vitamin K. I will continue to follow.

## 2020-03-18 NOTE — PROGRESS NOTES
Pharmacy Dosing of Warfarin Indication: A. fib Goal INR: 2-3 PTA Warfarin Dose: 4 mg on Sun, Wed and 2 mg on all other days Concurrent anticoagulants: none Concurrent antiplatelet: none Major Interacting Medications Drugs that may increase INR: flagyl; levaquin Drugs that may decrease INR: Phytonadione 2.5 mg on 3/9 Conditions that may increase/decrease INR (CHF, C. diff, cirrhosis, thyroid disorder, hypoalbuminemia): None Labs: 
Recent Labs  
  03/18/20 
0401 03/17/20 
0055 03/16/20 
0053 INR 4.4* 3.7* 3.7* HGB 9.2*  --  10.6* *  --  572* Estimated Creatinine Clearance: 32.8 mL/min (A) (based on SCr of 2.38 mg/dL (H)). Impression/Plan:   
INR inc to 4.4 (levaquin and flagyl still on board); Podiatry plans on debridement and grafting when INR is closer to 2. Continue to hold warfarin INR daily, CBC w/o diff every other day Thank you, Daquan Dixon, PHARMD

## 2020-03-18 NOTE — PROGRESS NOTES
Hospitalist Progress Note NAME: Risa Brown :  1960 MRN:  [id number] Assessment / Plan: 
Acute Encephalopathy not POA- noted in preop holding 3/14- now resolved Suspect metabolic due to hypoglycemia versus TIA versus Psychiatric event B12= 1403 FA= 85 Ammonia = 25 Stat CT head = Moderate small vessel ischemic changes. Chronic sinus disease. No 
acute intracranial abnormality Examination non focal 
FS 86--> 98 , s/p 1/2 amp D50 in preop holding-- some improvement in mental status but pt still confused- oriented x 1 (person) IP neurology consult appreciated MRI cancelled today as pt back to her normal self now & has PPM 
EEG per neurology if still indicated Off Cefepime now- pt has improved significantly now Cont to hold off Lantus for now & will allow to eat diet so her FS remain up in the mean time. INR therapeutic now (3.7) Left leg cellulitis and ulceration of the left foot 
- Continue with empiric metronidazole+ Levaquin (renlly dosed) - Blood cultures with no growth to date - CT leg noted - Venous US LLE negative for DVT Wound Cx= growing MSSA & sensitive Proteus 
 
- Pain control prn - Elevated limb as needed  
- POD#6 Debridement left foot wound and bone biopsy . Seen by Dr Clark Colon yesterday- was planned for Graft surgery saturday but pt had new AMS in preop holding 3/14-- procedure cancelled 3/17-- procedure cancelled due to INR 3. 7. will hold warfarin 3/18-- procedure cancelled due to INR 4.4. hold warfarin, PO vitamin K 2.5 mg  
NWB on L foot noted 
?need for wound vacc indicated 
 
  
  
DM- was hypoglycemic due to poor PO intake noted- FS now running high off lantus Had decreased Lantus to half at 10 units daily 3/13--resume today Holding scheduled Novolog 5 units with meals for now Cont SSI lispro as needed 
  
Atrial fibrillation/RVR 
- s/p Cardizem bolus and remains on Cardizem gtt with good rate control -- now transitioning to oral diltiazem - Continue metoprolol 
- Continue coumadin (hold today dose); pharmacy consult for dosing 
- supra therapeutic resolved initially  INR 4.4 today - Cardiology consult reviewed and appreciated  
 
Elevated troponin - Likely due to demand with rapid atrial fibrillation - Downtrending  
  
CKD4- Cr stable at ~ 2.0 
- Appears to be stable at baseline creatinine - Renally dose medications  
- sodium bicarbonate 650 mg bid  
- hold BUMEX 1 mg held as per nephrology - s/p IV iron - Epogen  
-BMP tomorrow - Nephrology on board. Greatly appreciate input  
  
HTN 
- Continue home medications: hydralazine,Toprol, Cardura , cardizem  
- hold clonidine and  Metolazone HLD 
  
Anxiety  
- Continue Zoloft, Buspar 
  
CHF 
- No exacerbation - On  Bumex  
  
SSS 
- Pacemaker Morbid obesity due to BMI >35 with DM, HTN / Body mass index is 35 kg/m². Code status: Full Prophylaxis: anticoagulated with warfarin Recommended Disposition: Home w/Family versus HH/SNF TBD post operatively on monday Subjective: Chief Complaint / Reason for Physician Visit: follow up rapid atrial fibrillation and cellulitis, foot ulcers, AMS now resolved \"I am feeling sleeping todayt\" Review of Systems: 
Symptom Y/N Comments  Symptom Y/N Comments Fever/Chills n   Chest Pain n   
Poor Appetite n   Edema y Cough n   Abdominal Pain n   
Sputum n   Joint Pain SOB/CHOW n   Pruritis/Rash Nausea/vomit n   Tolerating PT/OT Diarrhea    Tolerating Diet Constipation    Other Could NOT obtain due to:   
 
Objective: VITALS:  
Last 24hrs VS reviewed since prior progress note. Most recent are: 
Patient Vitals for the past 24 hrs: 
 Temp Pulse Resp BP SpO2  
03/18/20 1051 98.4 °F (36.9 °C) 74 18 119/45 100 % 03/18/20 0758 98.2 °F (36.8 °C) 90 18 126/68 100 % 03/18/20 0641  88  132/57   
03/18/20 0347 98.1 °F (36.7 °C) 87 18 137/65 98 % 03/17/20 2344 98 °F (36.7 °C) 81 18 134/57 100 % 03/17/20 2024 98.1 °F (36.7 °C) 78 18 130/62 98 % 03/17/20 1852  79  137/58   
03/17/20 1509 97.6 °F (36.4 °C) 70 18 111/57 100 % Intake/Output Summary (Last 24 hours) at 3/18/2020 1438 Last data filed at 3/18/2020 1333 Gross per 24 hour Intake 390 ml Output 890 ml Net -500 ml PHYSICAL EXAM: 
General: WD, WN. Alert, cooperative, no acute distress   
EENT:  EOMI. Anicteric sclerae. MMM Resp:  CTA bilaterally, no wheezing or rales. No accessory muscle use CV:  Irregularly irregular  rhythm, +LLE edema 1-2+ GI:  Soft, Non distended, Non tender.  +Bowel sounds Neurologic:  Alert and oriented X3 today (back to baseline MS today) Psych:   Good insight. Not anxious nor agitated Skin:  No jaundice. Erythema of distal left lower extremity and foot, with heat and tenderness. Reviewed most current lab test results and cultures  YES Reviewed most current radiology test results   YES Review and summation of old records today    NO Reviewed patient's current orders and MAR    YES 
PMH/ reviewed - no change compared to H&P 
________________________________________________________________________ Care Plan discussed with: 
  Comments Patient x Family RN x Care Manager x Consultant     
                 x Multidiciplinary team rounds were held today with , nursing, pharmacist and clinical coordinator. Patient's plan of care was discussed; medications were reviewed and discharge planning was addressed. ________________________________________________________________________ Total NON critical care TIME:  36   Minutes Total CRITICAL CARE TIME Spent:   Minutes non procedure based Comments >50% of visit spent in counseling and coordination of care    
________________________________________________________________________ Jolie Santos MD  
 
Procedures: see electronic medical records for all procedures/Xrays and details which were not copied into this note but were reviewed prior to creation of Plan. LABS: 
I reviewed today's most current labs and imaging studies. Pertinent labs include: 
Recent Labs  
  03/18/20 
0401 03/16/20 
0053 WBC 10.5 12.2* HGB 9.2* 10.6* HCT 30.0* 34.5*  
* 572* Recent Labs  
  03/18/20 
0401 03/17/20 
0055 03/16/20 
0053  136 139  
K 3.4* 3.4* 3.7  103 107 CO2 26 29 23 * 230* 182* BUN 36* 36* 33* CREA 2.38* 2.41* 2.19* CA 9.0 9.2 9.1 INR 4.4* 3.7* 3.7* Signed: Landry Regalado MD

## 2020-03-18 NOTE — PROGRESS NOTES
Nephrology Progress Note [facility] 
[email or web address]                  Phone - [phone] Patient: Lam Morgan Date- 3/18/2020 Admit Date: 3/8/2020 YOB: 1960 CC: Follow up for PARAG, CKD, HTN Subjective: Interval History:  
- cr. Worse She is not taking much po 
k low 
 
 
bumex was stopped on 3- No C/O of chest pain,SOB, vomiting, abdominal pain,fever,chills ROS:- as above IMPRESSION:  
· PARAG  - likely due to dehydration · ckd  3 LIKELY due to DM nephropathy and HTN nephrosclerosis- bl. Cr. 1.8-2.1 · Hypokalemia · Hyponatremia due to fluid overload · edema · hypertension · Diabetic foot · Proteinuria likely due to DM nephropathy and HTN nephroscl- urine pr/cr 1.5 g/g · Metabolic acidosis · anemia iron defi - iron sats 10% · Vit d defi PLAN:  
· Hold bumex Leg edema much better, no hypoxia, cr. Slowly increasing · Encourage po intake · No plan to start iv fluids · Hold po bicarb · Avoid hypotension- adjust hydralazine dose · S/p iv iron · Continue epogen · Follow bmp · Hold clonidine,  
· Hold metolazone Physical exam:  
GEN:  NAD NECK:  Supple, no thyromegaly RESP: CTA  b/l, no  wheezing, CVS: RRR,S1,S2 ABDO:  soft , non tender, No mass NEURO: non focal, normal speech EXT: Edema +nt Left Foot dressing + Care Plan discussed with: patient, 
Objective:  
Visit Vitals /45 (BP 1 Location: Left arm) Pulse 74 Temp 98.4 °F (36.9 °C) Resp 18 Ht 5' 9\" (1.753 m) Wt 107.5 kg (237 lb) SpO2 100% BMI 35.00 kg/m² Last 3 Recorded Weights in this Encounter 03/16/20 8862 03/17/20 0124 03/18/20 8695 Weight: 107 kg (235 lb 14.4 oz) 106.7 kg (235 lb 5 oz) 107.5 kg (237 lb)  
 
03/16 1901 - 03/18 0700 In: 340 [P.O.:340] Out: Boni Maxcy [[id number]] Intake/Output Summary (Last 24 hours) at 3/18/2020 1055 Last data filed at 3/18/2020 7261 Gross per 24 hour Intake 150 ml Output 690 ml Net -540 ml Chart reviewed. Pertinent Notes reviewed. Medication list  reviewed Current Facility-Administered Medications Medication  [START ON 3/19/2020] doxazosin (CARDURA) tablet 4 mg  WARFARIN INFORMATION NOTE (COUMADIN)  [START ON 3/19/2020] sodium bicarbonate tablet 650 mg  levoFLOXacin (LEVAQUIN) 750 mg in D5W IVPB  metroNIDAZOLE (FLAGYL) IVPB premix 500 mg  
 alcohol 62% (NOZIN) nasal  1 Ampule  epoetin viet-epbx (RETACRIT) 12,000 Units combo injection  HYDROcodone-acetaminophen (NORCO) 5-325 mg per tablet 1 Tab  docusate sodium (COLACE) capsule 100 mg  
 HYDROmorphone (PF) (DILAUDID) injection 0.5 mg  
 sodium hypochlorite (HALF STRENGTH DAKIN'S) 0.25% irrigation (bottle)  dilTIAZem (CARDIZEM) IR tablet 60 mg  WARFARIN INFORMATION NOTE (COUMADIN)  sodium chloride (NS) flush 5-10 mL  busPIRone (BUSPAR) tablet 10 mg  
 cholecalciferol (VITAMIN D3) (1000 Units /25 mcg) tablet 5 Tab  cyanocobalamin (VITAMIN B12) tablet 1,000 mcg  hydrALAZINE (APRESOLINE) tablet 100 mg  
 metoprolol succinate (TOPROL-XL) XL tablet 100 mg  
 sertraline (ZOLOFT) tablet 50 mg  
 sodium chloride (NS) flush 5-40 mL  sodium chloride (NS) flush 5-40 mL  acetaminophen (TYLENOL) tablet 650 mg  
 naloxone (NARCAN) injection 0.4 mg  
 glucose chewable tablet 16 g  
 dextrose (D50W) injection syrg 12.5-25 g  
 glucagon (GLUCAGEN) injection 1 mg  
 insulin lispro (HUMALOG) injection Data Review : 
Recent Labs  
  03/18/20 
0401 03/17/20 
0055 03/16/20 
0053  136 139  
K 3.4* 3.4* 3.7  103 107 CO2 26 29 23 BUN 36* 36* 33* CREA 2.38* 2.41* 2.19* * 230* 182* CA 9.0 9.2 9.1 Recent Labs  
  03/18/20 
0401 03/16/20 
0053 WBC 10.5 12.2* HGB 9.2* 10.6* HCT 30.0* 34.5*  
* 572* No results for input(s): FE, TIBC, PSAT, FERR in the last 72 hours. No results for input(s): CPK, CKNDX, TROIQ in the last 72 hours. No lab exists for component: CPKMB Lab Results Component Value Date/Time Color YELLOW/STRAW 03/08/2020 05:00 PM  
 Appearance CLOUDY (A) 03/08/2020 05:00 PM  
 Specific gravity 1.019 03/08/2020 05:00 PM  
 pH (UA) 5.0 03/08/2020 05:00 PM  
 Protein 100 (A) 03/08/2020 05:00 PM  
 Glucose 100 (A) 03/08/2020 05:00 PM  
 Ketone 15 (A) 03/08/2020 05:00 PM  
 Bilirubin NEGATIVE  03/08/2020 05:00 PM  
 Urobilinogen 0.2 03/08/2020 05:00 PM  
 Nitrites NEGATIVE  03/08/2020 05:00 PM  
 Leukocyte Esterase SMALL (A) 03/08/2020 05:00 PM  
 Epithelial cells FEW 03/08/2020 05:00 PM  
 Bacteria NEGATIVE  03/08/2020 05:00 PM  
 WBC 0-4 03/08/2020 05:00 PM  
 RBC 0-5 03/08/2020 05:00 PM  
 
Lab Results Component Value Date/Time Culture result: (A) 03/10/2020 07:15 PM  
  HEAVY ANAEROBIC GRAM NEGATIVE RODS BETA LACTAMASE POSITIVE Culture result: LIGHT STAPHYLOCOCCUS AUREUS (A) 03/10/2020 07:15 PM  
 Culture result: SCANT PROTEUS MIRABILIS (A) 03/10/2020 07:15 PM  
 Culture result: (A) 03/10/2020 07:15 PM  
  HEAVY STREPTOCOCCI, BETA HEMOLYTIC GROUP B Penicillin and ampicillin are drugs of choice for treatment of beta-hemolytic streptococcal infections. Susceptibility testing of penicillins and beta-lactams approved by the FDA for treatment of beta-hemolytic streptococcal infections need not be performed routinely, because nonsusceptible isolates are extremely rare. CLSI 2012 Culture result: (A) 03/10/2020 07:15 PM  
  HEAVY STREPTOCOCCI, BETA HEMOLYTIC GROUP G Penicillin and ampicillin are drugs of choice for treatment of beta-hemolytic streptococcal infections. Susceptibility testing of penicillins and beta-lactams approved by the FDA for treatment of beta-hemolytic streptococcal infections need not be performed routinely, because nonsusceptible isolates are extremely rare. CLSI 2012 Lab Results Component Value Date/Time Specimen Description: RENEE 04/09/2014 12:27 PM  
 Specimen Description: RENEE 10/28/2013 05:00 PM  
 Specimen Description: [facility] 10/28/2013 03:09 PM  
 
Lab Results Component Value Date/Time Creatinine, urine 45.70 03/09/2020 09:50 PM  
 
Results from [facility] Encounter encounter on 03/08/20 XR FOOT LT MIN 3 V Narrative EXAM: XR FOOT LT MIN 3 V 
 
INDICATION: L foot swelling; eval for osteo changes. COMPARISON: 3/3/2020. FINDINGS: Three views of the left foot demonstrate no bone erosion, periostitis 
or other evidence of osteomyelitis. There are 2 plantar soft tissue ulcers, 
without foreign body. There is chronic deformity of the distal portion of the second toe and mid foot 
osteoarthrosis. No acute fracture is seen. Arterial calcification is noted. Impression IMPRESSION: No signal change. Plantar soft tissue ulcers. No apparent 
osteomyelitis. RENAL USG 
TECHNIQUE:  
Routine ultrasound images of the kidneys and retroperitoneum were obtained. 
  
FINDINGS:  
The right kidney measures 11.5 cm in length. The left kidney measures 10.7 cm in 
length. There is increased echogenicity of the kidneys bilaterally, consistent with 
medical renal disease. There is thinning of the right renal cortex. No renal 
calculus is seen. There is no hydronephrosis. No renal cyst or solid mass is 
evident.   
  
The abdominal aorta and common iliac arteries were obscured by gas. The 
visualized portion of the IVC appears patent. The urinary bladder demonstrates 
no filling defect.  
  
IMPRESSION IMPRESSION:  
Echogenic kidneys, consistent with medical renal disease. Cortical thinning on 
the right. No hydronephrosis. . 
  
 
                Yuliya Flores MD 
[facility] 
 www. [facility].Critical access hospital / [employer] [address] [place], [address] Phone - [phone] Fax - [phone]

## 2020-03-18 NOTE — PROGRESS NOTES
Problem: Falls - Risk of 
Goal: *Absence of Falls Description: Document Reema Gutierrez Fall Risk and appropriate interventions in the flowsheet. Outcome: Progressing Towards Goal 
Note: Fall Risk Interventions: 
Mobility Interventions: Bed/chair exit alarm, Communicate number of staff needed for ambulation/transfer, Patient to call before getting OOB Mentation Interventions: Adequate sleep, hydration, pain control, Bed/chair exit alarm, Evaluate medications/consider consulting pharmacy Medication Interventions: Bed/chair exit alarm, Evaluate medications/consider consulting pharmacy, Patient to call before getting OOB, Teach patient to arise slowly Elimination Interventions: Bed/chair exit alarm, Call light in reach, Patient to call for help with toileting needs History of Falls Interventions: Bed/chair exit alarm, Door open when patient unattended, Evaluate medications/consider consulting pharmacy Problem: Pressure Injury - Risk of 
Goal: *Prevention of pressure injury Description: Document Valdemar Scale and appropriate interventions in the flowsheet. Outcome: Progressing Towards Goal 
Note: Pressure Injury Interventions: 
Sensory Interventions: Assess changes in LOC, Assess need for specialty bed, Float heels, Keep linens dry and wrinkle-free Moisture Interventions: Absorbent underpads, Contain wound drainage, Internal/External urinary devices Activity Interventions: Chair cushion, Increase time out of bed, Pressure redistribution bed/mattress(bed type) Mobility Interventions: Assess need for specialty bed, Chair cushion, Float heels, Pressure redistribution bed/mattress (bed type) Nutrition Interventions: Document food/fluid/supplement intake, Offer support with meals,snacks and hydration Friction and Shear Interventions: Apply protective barrier, creams and emollients, Feet elevated on foot rest 
 
  
 
 
 
  
Problem: Infection - Risk of, Surgical Site Infection Goal: *Absence of surgical site infection signs and symptoms Outcome: Progressing Towards Goal

## 2020-03-18 NOTE — PROGRESS NOTES
Report received from [facility] , [address] [place] [address]. SBAR were discussed.  
 
Nolan Roman RN

## 2020-03-19 LAB
ANION GAP SERPL CALC-SCNC: 4 MMOL/L (ref 5–15)
BUN SERPL-MCNC: 35 MG/DL (ref 6–20)
BUN/CREAT SERPL: 14 (ref 12–20)
CALCIUM SERPL-MCNC: 8.9 MG/DL (ref 8.5–10.1)
CHLORIDE SERPL-SCNC: 105 MMOL/L (ref 97–108)
CO2 SERPL-SCNC: 26 MMOL/L (ref 21–32)
CREAT SERPL-MCNC: 2.51 MG/DL (ref 0.55–1.02)
GLUCOSE BLD STRIP.AUTO-MCNC: 130 MG/DL (ref 65–100)
GLUCOSE BLD STRIP.AUTO-MCNC: 177 MG/DL (ref 65–100)
GLUCOSE BLD STRIP.AUTO-MCNC: 188 MG/DL (ref 65–100)
GLUCOSE BLD STRIP.AUTO-MCNC: 192 MG/DL (ref 65–100)
GLUCOSE BLD STRIP.AUTO-MCNC: 253 MG/DL (ref 65–100)
GLUCOSE SERPL-MCNC: 177 MG/DL (ref 65–100)
INR PPP: 3 (ref 0.9–1.1)
POTASSIUM SERPL-SCNC: 3.8 MMOL/L (ref 3.5–5.1)
PROTHROMBIN TIME: 29 SEC (ref 9–11.1)
SERVICE CMNT-IMP: ABNORMAL
SODIUM SERPL-SCNC: 135 MMOL/L (ref 136–145)

## 2020-03-19 PROCEDURE — 65660000000 HC RM CCU STEPDOWN

## 2020-03-19 PROCEDURE — 74011636637 HC RX REV CODE- 636/637: Performed by: PODIATRIST

## 2020-03-19 PROCEDURE — 74011250637 HC RX REV CODE- 250/637: Performed by: INTERNAL MEDICINE

## 2020-03-19 PROCEDURE — 74011250636 HC RX REV CODE- 250/636: Performed by: INTERNAL MEDICINE

## 2020-03-19 PROCEDURE — 80048 BASIC METABOLIC PNL TOTAL CA: CPT

## 2020-03-19 PROCEDURE — 74011250637 HC RX REV CODE- 250/637: Performed by: PODIATRIST

## 2020-03-19 PROCEDURE — 82962 GLUCOSE BLOOD TEST: CPT

## 2020-03-19 PROCEDURE — 97116 GAIT TRAINING THERAPY: CPT

## 2020-03-19 PROCEDURE — 85610 PROTHROMBIN TIME: CPT

## 2020-03-19 PROCEDURE — 36415 COLL VENOUS BLD VENIPUNCTURE: CPT

## 2020-03-19 PROCEDURE — 74011250637 HC RX REV CODE- 250/637: Performed by: FAMILY MEDICINE

## 2020-03-19 RX ORDER — LEVOFLOXACIN 5 MG/ML
750 INJECTION, SOLUTION INTRAVENOUS
Status: COMPLETED | OUTPATIENT
Start: 2020-03-19 | End: 2020-03-21

## 2020-03-19 RX ORDER — FLUCONAZOLE 100 MG/1
150 TABLET ORAL ONCE
Status: COMPLETED | OUTPATIENT
Start: 2020-03-19 | End: 2020-03-19

## 2020-03-19 RX ORDER — SODIUM CHLORIDE 9 MG/ML
100 INJECTION, SOLUTION INTRAVENOUS CONTINUOUS
Status: DISPENSED | OUTPATIENT
Start: 2020-03-19 | End: 2020-03-20

## 2020-03-19 RX ADMIN — HYDRALAZINE HYDROCHLORIDE 50 MG: 50 TABLET, FILM COATED ORAL at 13:31

## 2020-03-19 RX ADMIN — Medication 10 ML: at 01:37

## 2020-03-19 RX ADMIN — METRONIDAZOLE 500 MG: 500 INJECTION, SOLUTION INTRAVENOUS at 11:31

## 2020-03-19 RX ADMIN — MELATONIN 5 TABLET: at 08:41

## 2020-03-19 RX ADMIN — FLUCONAZOLE 150 MG: 100 TABLET ORAL at 11:30

## 2020-03-19 RX ADMIN — ACETAMINOPHEN 650 MG: 325 TABLET ORAL at 08:41

## 2020-03-19 RX ADMIN — PHYTONADIONE 2.5 MG: 10 INJECTION, EMULSION INTRAMUSCULAR; INTRAVENOUS; SUBCUTANEOUS at 15:09

## 2020-03-19 RX ADMIN — HYDRALAZINE HYDROCHLORIDE 50 MG: 50 TABLET, FILM COATED ORAL at 06:34

## 2020-03-19 RX ADMIN — CYANOCOBALAMIN TAB 500 MCG 1000 MCG: 500 TAB at 08:41

## 2020-03-19 RX ADMIN — DILTIAZEM HYDROCHLORIDE 60 MG: 60 TABLET, FILM COATED ORAL at 17:23

## 2020-03-19 RX ADMIN — ACETAMINOPHEN 650 MG: 325 TABLET ORAL at 18:45

## 2020-03-19 RX ADMIN — DILTIAZEM HYDROCHLORIDE 60 MG: 60 TABLET, FILM COATED ORAL at 11:29

## 2020-03-19 RX ADMIN — BUSPIRONE HYDROCHLORIDE 10 MG: 10 TABLET ORAL at 08:41

## 2020-03-19 RX ADMIN — SERTRALINE HYDROCHLORIDE 50 MG: 50 TABLET ORAL at 08:41

## 2020-03-19 RX ADMIN — INSULIN LISPRO 5 UNITS: 100 INJECTION, SOLUTION INTRAVENOUS; SUBCUTANEOUS at 12:15

## 2020-03-19 RX ADMIN — METOPROLOL SUCCINATE 100 MG: 50 TABLET, EXTENDED RELEASE ORAL at 08:41

## 2020-03-19 RX ADMIN — Medication 10 ML: at 13:32

## 2020-03-19 RX ADMIN — DILTIAZEM HYDROCHLORIDE 60 MG: 60 TABLET, FILM COATED ORAL at 08:41

## 2020-03-19 RX ADMIN — DOCUSATE SODIUM 100 MG: 100 CAPSULE, LIQUID FILLED ORAL at 17:23

## 2020-03-19 RX ADMIN — INSULIN LISPRO 2 UNITS: 100 INJECTION, SOLUTION INTRAVENOUS; SUBCUTANEOUS at 08:40

## 2020-03-19 RX ADMIN — LEVOFLOXACIN 750 MG: 5 INJECTION, SOLUTION INTRAVENOUS at 13:31

## 2020-03-19 RX ADMIN — Medication 1 AMPULE: at 09:06

## 2020-03-19 RX ADMIN — SODIUM CHLORIDE 100 ML/HR: 900 INJECTION, SOLUTION INTRAVENOUS at 11:22

## 2020-03-19 NOTE — PROGRESS NOTES
General Surgery End of Shift Nursing Note Bedside shift change report given to Judith Burks RN  (oncoming nurse) by Jodee Clark RN  (offgoing nurse). Report included the following information SBAR, Procedure Summary, Intake/Output, MAR, Accordion and Recent Results. Shift worked: 7 am-7 pm  
Summary of shift:  Patient transferred from PCU; wound care performed by previous nurse; patient scheduled to have graft if INR WNL Issues for physician to address: No concern Number times ambulated in hallway past shift: 0 Number of times OOB to chair past shift: 2 Pain Management: 
Current medication: Dilaudid, Acetaminophen Patient states pain is manageable on current pain medication: YES 
 
GI: 
 
Current diet:  DIET DIABETIC CONSISTENT CARB Regular DIET NUTRITIONAL SUPPLEMENTS Lunch, Breakfast, Dinner; Glucerna Shake DIET ONE TIME MESSAGE Tolerating current diet: YES Passing flatus: YES Last Bowel Movement: yesterday Appearance: Did not observe Respiratory: 
 
Incentive Spirometer at bedside: NO 
Patient instructed on use: NO 
 
Patient Safety: 
 
Falls Score: 2 Bed Alarm On? No 
Sitter?  No 
 
Sacha Garcia RN

## 2020-03-19 NOTE — PROGRESS NOTES
TRANSFER - OUT REPORT: 
 
Verbal report given to Rosa(name) on Garrick Ashford  being transferred to Gen-surg(unit) for routine progression of care Report consisted of patients Situation, Background, Assessment and  
Recommendations(SBAR). Information from the following report(s) SBAR, Kardex, ED Summary, Procedure Summary, Intake/Output, MAR, Accordion, Recent Results, Med Rec Status and Cardiac Rhythm a-fib was reviewed with the receiving nurse. Lines:  
Peripheral IV 03/17/20 Left Antecubital (Active) Site Assessment Clean, dry, & intact 3/19/2020  3:00 AM  
Phlebitis Assessment 0 3/19/2020  3:00 AM  
Infiltration Assessment 0 3/19/2020  3:00 AM  
Dressing Status Clean, dry, & intact 3/19/2020  3:00 AM  
Dressing Type Transparent;Tape 3/19/2020  3:00 AM  
Hub Color/Line Status Blue;Flushed 3/19/2020  3:00 AM  
Action Taken Blood drawn 3/17/2020  1:24 AM  
  
 
Opportunity for questions and clarification was provided. Patient transported with: 
 [employer]

## 2020-03-19 NOTE — PROGRESS NOTES
Bedside shift change report GIVEN TO Adia Bermudez RN. Report included the following information SBAR and Kardex. SIGNIFICANT CHANGES DURING SHIFT:  None. CONCERNS TO ADDRESS WITH MD:  None. [address] NURSING NOTE Admission Date 3/8/2020 Admission Diagnosis Atrial fibrillation with RVR ([address] [I48.91] Left leg cellulitis [C28.200] Consults IP CONSULT TO CARDIOLOGY 
IP CONSULT TO PODIATRY IP CONSULT TO NEPHROLOGY 
IP CONSULT TO NEUROLOGY Cardiac Monitoring [x] Yes [] No  
  
Purposeful Hourly Rounding [x] Yes   
Rayne Score Total Score: 2 Rayne score 3 or > [x] Bed Alarm [] Avasys [] 1:1 sitter [] Patient refused (Signed refusal form in chart) Valdemar Score Valdemar Score: 16 Valdemar score 14 or < [] PMT consult [] Wound Care consult  
 []  Specialty bed  [] Nutrition consult Influenza Vaccine Received Flu Vaccine for Current Season (usually Sept-March): Yes Oxygen needs? [x] Room air Oxygen @  []1L    []2L    []3L   []4L    []5L   []6L via NC Chronic home O2 use? [] Yes [x] No 
Perform O2 challenge test and document in progress note using [employer]) Last bowel movement Last Bowel Movement Date: 03/18/20 Urinary Catheter External Female Catheter 03/10/20-Urine Output (mL): 200 ml LDAs Peripheral IV 03/17/20 Left Antecubital (Active) Site Assessment Clean, dry, & intact 3/19/2020  3:00 AM  
Phlebitis Assessment 0 3/19/2020  3:00 AM  
Infiltration Assessment 0 3/19/2020  3:00 AM  
Dressing Status Clean, dry, & intact 3/19/2020  3:00 AM  
Dressing Type Transparent;Tape 3/19/2020  3:00 AM  
Hub Color/Line Status Blue;Flushed 3/19/2020  3:00 AM  
Action Taken Blood drawn 3/17/2020  1:24 AM  
      
External Female Catheter 03/10/20 (Active) Site Assessment Clean, dry, & intact 3/19/2020  3:00 AM  
Repositioned Yes 3/19/2020  3:00 AM  
Perineal Care Yes 3/19/2020  3:00 AM  
Wick Changed Yes 3/19/2020  3:00 AM  
 Suction Canister/Tubing Changed No 3/19/2020  3:00 AM  
Urine Output (mL) 200 ml 3/18/2020  1:33 PM  
            
  
Readmission Risk Assessment Tool Score High Risk 28 Total Score 3 Has Seen PCP in Last 6 Months (Yes=3, No=0) 2 . Living with Significant Other. Assisted Living. LTAC. SNF. or  
Rehab  
 3 Patient Length of Stay (>5 days = 3) 4 IP Visits Last 12 Months (1-3=4, 4=9, >4=11) 23 Charlson Comorbidity Score (Age + Comorbid Conditions) Criteria that do not apply:  
 Pt. Coverage (Medicare=5 , Medicaid, or Self-Pay=4) Expected Length of Stay 6d 12h Actual Length of Stay 11

## 2020-03-19 NOTE — PROGRESS NOTES
Spiritual Care Assessment/Progress Note [address] 
 
 
NAME: Tomasa Tadeo      MRN: [id number] AGE: 60 y.o. SEX: female Yazdanism Affiliation: [facility]  
Language: [place] 3/19/2020     Total Time (in minutes): 14 Spiritual Assessment begun in [facility] GENERAL SURGERY through conversation with: 
  
    [x]Patient        [] Family    [] Friend(s) Reason for Consult: Initial/Spiritual assessment, patient floor Spiritual beliefs: (Please include comment if needed) 
   [] Identifies with a adriane tradition:     
   [] Supported by a adriane community:        
   [] Claims no spiritual orientation:       
   [] Seeking spiritual identity:            
   [x] Adheres to an individual form of spirituality:       
   [] Not able to assess:                   
 
    
Identified resources for coping:  
   [] Prayer                           
   [x] Music                  [] Guided Imagery 
   [] Family/friends                 [] Pet visits [] Devotional reading                         [] Unknown 
   [] Other:                                          
 
 
Interventions offered during this visit: (See comments for more details) Patient Interventions: Affirmation of adriane, Affirmation of emotions/emotional suffering, Catharsis/review of pertinent events in supportive environment, Iconic (affirming the presence of God/Higher Power), Initial/Spiritual assessment, patient floor, Yazdanism beliefs/image of God discussed Plan of Care: 
 
 [] Support spiritual and/or cultural needs  
 [] Support AMD and/or advance care planning process    
 [] Support grieving process 
 [] Coordinate Rites and/or Rituals  
 [] Coordination with community clergy [] No spiritual needs identified at this time 
 [] Detailed Plan of Care below (See Comments)  [] Make referral to Music Therapy 
[] Make referral to Pet Therapy    
[] Make referral to Addiction services [] Make referral to [facility] 
[] Make referral to Spiritual Care Partner 
[] No future visits requested       
[x] Follow up visits as needed Comments: The purpose of the visit was to conduct a spiritual assessment on the patient. Patient was not being visited by anyone so she welcomed the visit. The patient expressed the anxiety and fear she and her  felt  when they didn't know what was causing her to pass out. She mentioned feeling relieved once she knew what caused her to have a adverse reaction. Patient shared about her adriane and how she is strengthened by nature. She mentioned that her  is a great support for her and how she depends on him a lot. She mentioned that music is therapeutic for her. The  facilitated meaningful conversation, employed empathic listening and reflection. [address] [facility] Nolan Siegel.  paging service [phone])

## 2020-03-19 NOTE — PROGRESS NOTES
Hospitalist Progress Note NAME: Radha Cohn :  1960 MRN:  [id number] Assessment / Plan: 
Acute Encephalopathy not POA- noted in preop holding 3/14- now resolved Suspect metabolic due to hypoglycemia versus TIA versus Psychiatric event B12= 1403 FA= 85 Ammonia = 25 Stat CT head = Moderate small vessel ischemic changes. Chronic sinus disease. No 
acute intracranial abnormality Examination non focal 
FS 86--> 98 , s/p 1/2 amp D50 in preop holding-- some improvement in mental status but pt still confused- oriented x 1 (person) IP neurology consult appreciated MRI cancelled today as pt back to her normal self now & has PPM 
EEG per neurology if still indicated Off Cefepime now- pt has improved significantly now Cont to hold off Lantus for now & will cont SS Left leg cellulitis and ulceration of the left foot 
- Continue with metronidazole+ Levaquin (renlly dosed) - Blood cultures with no growth to date - CT leg noted - Venous US LLE negative for DVT Wound Cx= growing MSSA & sensitive Proteus 
 
- Pain control prn - Elevated limb as needed  
- POD#9 Debridement left foot wound and bone biopsy . Seen by Dr Stewart Lea yesterday- was planned for Graft surgery saturday but pt had new AMS in preop holding 3/14-- procedure cancelled 3/17-- procedure cancelled due to INR 3. 7. will hold warfarin 3/18-- procedure cancelled due to INR 4.4. hold warfarin, PO vitamin K 2.5 mg  
3/19-INR 3, will give additional dose of Vit K 
NWB on L foot noted 
?need for wound vacc indicated Recurrent candida vaginitis - s/p Fluconazole 150 mg PO x1  
  
DM- was hypoglycemic due to poor PO intake noted- FS now running high off lantus Cont holding Lantus Holding scheduled Novolog 5 units with meals for now Cont SSI lispro as needed 
  
Atrial fibrillation/RVR 
- s/p Cardizem bolus and Cardizem gtt with good rate control -- now transitioning to oral diltiazem 
- Continue metoprolol - Continue coumadin (hold today dose); pharmacy consult for dosing 
- supra therapeutic resolved initially  INR 3 today - Cardiology consult reviewed and appreciated  
 
Elevated troponin - Likely due to demand with rapid atrial fibrillation - Downtrending  
  
CKD4- Cr stable at ~ 2.4 
- Appears to be stable at baseline creatinine - Renally dose medications  
- hold sodium bicarbonate as per nephro 
- hold BUMEX 1 mg held as per nephrology - s/p IV iron - Epogen  
-BMP tomorrow -Nephrology on board. Greatly appreciate input  
  
HTN 
- Continue home medications: hydralazine,Toprol, Cardura , cardizem  
- hold clonidine and  Metolazone HLD 
  
Anxiety  
- Continue Zoloft, Buspar 
  
CHF 
- No exacerbation - On  Bumex  
  
SSS 
- Pacemaker Morbid obesity due to BMI >35 with DM, HTN / Body mass index is 35.23 kg/m². Code status: Full Prophylaxis: anticoagulated with warfarin Recommended Disposition: Home w/Family versus HH/SNF TBD post operatively on monday Subjective: Chief Complaint / Reason for Physician Visit: follow up rapid atrial fibrillation and cellulitis, foot ulcers, AMS now resolved \"I am feeling greatt\" Review of Systems: 
Symptom Y/N Comments  Symptom Y/N Comments Fever/Chills n   Chest Pain n   
Poor Appetite n   Edema y Cough n   Abdominal Pain n   
Sputum n   Joint Pain SOB/CHOW n   Pruritis/Rash Nausea/vomit n   Tolerating PT/OT Diarrhea    Tolerating Diet Constipation    Other Could NOT obtain due to:   
 
Objective: VITALS:  
Last 24hrs VS reviewed since prior progress note. Most recent are: 
Patient Vitals for the past 24 hrs: 
 Temp Pulse Resp BP SpO2  
03/19/20 0732 98.6 °F (37 °C) 89 18 (!) 148/6 100 % 03/19/20 0633    146/57   
03/19/20 0341 97.1 °F (36.2 °C) 81 18 125/63 100 % 03/19/20 0003 98.1 °F (36.7 °C) 79 18 120/54 100 % 03/18/20 1934 97.9 °F (36.6 °C) 67 18 122/51 98 % 03/18/20 1507 97.8 °F (36.6 °C) 74 18 136/63 100 % 03/18/20 1051 98.4 °F (36.9 °C) 74 18 119/45 100 % Intake/Output Summary (Last 24 hours) at 3/19/2020 5750 Last data filed at 3/19/2020 8916 Gross per 24 hour Intake 1490 ml Output 470 ml Net 1020 ml PHYSICAL EXAM: 
General: WD, WN. Alert, cooperative, no acute distress   
EENT:  EOMI. Anicteric sclerae. MMM Resp:  CTA bilaterally, no wheezing or rales. No accessory muscle use CV:  Irregularly irregular  rhythm, +LLE edema 1-2+ GI:  Soft, Non distended, Non tender.  +Bowel sounds Neurologic:  Alert and oriented X3 today (back to baseline MS today) Psych:   Good insight. Not anxious nor agitated Skin:  No jaundice. Erythema of distal left lower extremity and foot, with heat and tenderness. Reviewed most current lab test results and cultures  YES Reviewed most current radiology test results   YES Review and summation of old records today    NO Reviewed patient's current orders and MAR    YES 
PMH/SH reviewed - no change compared to H&P 
________________________________________________________________________ Care Plan discussed with: 
  Comments Patient x Family RN x Care Manager x Consultant     
                 x Multidiciplinary team rounds were held today with , nursing, pharmacist and clinical coordinator. Patient's plan of care was discussed; medications were reviewed and discharge planning was addressed. ________________________________________________________________________ Total NON critical care TIME:  36   Minutes Total CRITICAL CARE TIME Spent:   Minutes non procedure based Comments >50% of visit spent in counseling and coordination of care    
________________________________________________________________________ Shaorn Salts, MD  
 
Procedures: see electronic medical records for all procedures/Xrays and details which were not copied into this note but were reviewed prior to creation of Plan. LABS: 
I reviewed today's most current labs and imaging studies. Pertinent labs include: 
Recent Labs  
  03/18/20 
0401 WBC 10.5 HGB 9.2* HCT 30.0*  
* Recent Labs  
  03/19/20 
0135 03/18/20 
0401 03/17/20 
0055 * 136 136  
K 3.8 3.4* 3.4*  
 105 103 CO2 26 26 29 * 148* 230* BUN 35* 36* 36* CREA 2.51* 2.38* 2.41* CA 8.9 9.0 9.2 INR 3.0* 4.4* 3.7* Signed: Rio Chau MD

## 2020-03-19 NOTE — PROGRESS NOTES
Problem: Falls - Risk of 
Goal: *Absence of Falls Description: Document Olivia Stoddard Fall Risk and appropriate interventions in the flowsheet. Outcome: Progressing Towards Goal 
Note: Fall Risk Interventions: 
Mobility Interventions: Bed/chair exit alarm, Patient to call before getting OOB Mentation Interventions: Adequate sleep, hydration, pain control, Bed/chair exit alarm Medication Interventions: Bed/chair exit alarm, Patient to call before getting OOB, Teach patient to arise slowly Elimination Interventions: Bed/chair exit alarm, Call light in reach History of Falls Interventions: Bed/chair exit alarm Problem: Pressure Injury - Risk of 
Goal: *Prevention of pressure injury Description: Document Valdemar Scale and appropriate interventions in the flowsheet. Outcome: Progressing Towards Goal 
Note: Pressure Injury Interventions: 
Sensory Interventions: Assess changes in LOC Moisture Interventions: Absorbent underpads, Apply protective barrier, creams and emollients Activity Interventions: Chair cushion, Increase time out of bed Mobility Interventions: Float heels, PT/OT evaluation Nutrition Interventions: Document food/fluid/supplement intake, Offer support with meals,snacks and hydration Friction and Shear Interventions: Feet elevated on foot rest, Lift sheet Problem: Hypertension Goal: *Blood pressure within specified parameters Outcome: Progressing Towards Goal 
Goal: *Fluid volume balance Outcome: Progressing Towards Goal 
Goal: *Labs within defined limits Outcome: Progressing Towards Goal 
  
Problem: Pain Goal: *Control of Pain Outcome: Progressing Towards Goal 
Goal: *PALLIATIVE CARE:  Alleviation of Pain Outcome: Progressing Towards Goal 
  
Problem: Infection - Risk of, Surgical Site Infection Goal: *Absence of surgical site infection signs and symptoms Outcome: Progressing Towards Goal

## 2020-03-19 NOTE — PROGRESS NOTES
Problem: Mobility Impaired (Adult and Pediatric) Goal: *Acute Goals and Plan of Care (Insert Text) Description: FUNCTIONAL STATUS PRIOR TO ADMISSION: Patient was modified independent using a rolling walker for functional mobility. Patient required minimal assistance for basic and instrumental ADLs. HOME SUPPORT PRIOR TO ADMISSION: The patient lived with who assists with ADLs and meals. Physical Therapy Goals Initiated 3/16/2020 1. Patient will move from supine to sit and sit to supine , scoot up and down and roll side to side in bed with modified independence within 7 day(s). 2.  Patient will transfer from bed to chair and chair to bed with minimal assistance/contact guard assist using the least restrictive device within 7 day(s). 3.  Patient will perform sit to stand with contact guard assist within 7 day(s). 4.  Patient will ambulate with minimal assistance/contact guard assist for 30 feet with the least restrictive device within 7 day(s). Outcome: Progressing Towards Goal 
 PHYSICAL THERAPY TREATMENT Patient: Emeli Looney (89 y.o. female) Date: 3/19/2020 Diagnosis: Atrial fibrillation with RVR ([address] [I48.91] Left leg cellulitis [L14.696] <principal problem not specified> Procedure(s) (LRB): 
SKIN SUBSTITUTE GRAFT APPLICATION LEFT FOOT MISONIX DEBRIDEMENT (INTEGRA GRAFT OR THERASKIN GRAFT 4X4CM) (Left) 5 Days Post-Op Precautions: WBAT Chart, physical therapy assessment, plan of care and goals were reviewed. ASSESSMENT Patient continues with skilled PT services and is progressing towards goals. MD clarified WB status for LLE as \"WB with assistance\". Patient demonstrated independent skill with supine to sit and sit to stand transfers. Gait tolerated for 40 feet inside room using RW to limit WB onto LLE - demonstrated sba skill level with 1 mild lob needing cg assistance to improve safety. No sob, but distance was limited by fatigue. She was left in recliner with all needs met when the session ended. Discharge disposition will be based on patients post skin graft and wb restrictions following upcoming surgery. Current Level of Function Impacting Discharge (mobility/balance): sba ambulation Other factors to consider for discharge: lives with spouse, who is a \"Horder\" lots of clutter. PLAN : 
Patient continues to benefit from skilled intervention to address the above impairments. Continue treatment per established plan of care. to address goals. Recommendation for discharge: (in order for the patient to meet his/her long term goals) Therapy up to 5 days/week in SNF setting or intensive home health therapy program pending progress following planned skin graft This discharge recommendation: 
Has been made in collaboration with the attending provider and/or case management IF patient discharges home will need the following DME: wheelchair SUBJECTIVE:  
Patient stated  I'm hoping I will be able to get the skin graft tomorrow  OBJECTIVE DATA SUMMARY:  
Critical Behavior: 
Neurologic State: Alert Orientation Level: Oriented X4 Cognition: Appropriate decision making, Appropriate for age attention/concentration, Appropriate safety awareness, Follows commands Safety/Judgement: Awareness of environment, Good awareness of safety precautions Functional Mobility Training: 
Bed Mobility: 
Rolling: Independent Supine to Sit: Independent Scooting: Independent Transfers: 
Sit to Stand: Modified independent Stand to Sit: Modified independent Bed to Chair: Supervision; Adaptive equipment(RW) 
     
  
  
  
  
Balance: 
Sitting: Intact Standing: Impaired Standing - Static: Good;Occasional 
Standing - Dynamic : Good;Constant support Ambulation/Gait Training: 
Distance (ft): 40 Feet (ft) Assistive Device: Gait belt;Walker, rolling Ambulation - Level of Assistance: Supervision Gait Abnormalities: Step to gait; Path deviations(No LOB) Base of Support: Widened Stance: Left decreased(WBAT) Speed/Kat: Pace decreased (<100 feet/min) Step Length: Right shortened;Left shortened Pain Rating: 
No L foot pain reported with wb due to neuropathy Activity Tolerance:  
Fair, SpO2 stable on RA, and requires rest breaks Please refer to the flowsheet for vital signs taken during this treatment. After treatment patient left in no apparent distress:  
Sitting in chair and Call bell within reach COMMUNICATION/COLLABORATION:  
The patients plan of care was discussed with: Registered nurse and Rehabilitation technician. Kavita Foster, PT Time Calculation: 13 mins numerical 0-10

## 2020-03-19 NOTE — PROGRESS NOTES
Nephrology Progress Note [facility] 
[email or web address]                  Phone - [phone] Patient: Kev Curran Date- 3/19/2020 Admit Date: 3/8/2020 YOB: 1960 CC: Follow up for parag, ckd Subjective: Interval History:  
- parag/ cr. Worse  
 
bumex was stopped on 3- No C/O of chest pain,SOB, vomiting, abdominal pain,fever,chills ROS:- as above IMPRESSION:  
· PARAG- likely due to dehydration- other possible etiology is due to SEPSIS , AIN, ATN due to abx - ( she received multiple abx since admission including vanco. ) · ckd  3 LIKELY due to DM nephropathy and HTN nephrosclerosis- bl. Cr. 1.8-2.1 · Hypokalemia · Hyponatremia due to fluid overload · edema · hypertension · Diabetic foot · Proteinuria likely due to DM nephropathy and HTN nephroscl- urine pr/cr 1.5 g/g · Metabolic acidosis · anemia iron defi - iron sats 10% · Vit d defi PLAN:  
· Continue to hold bumex. Leg edema much better, no hypoxia, cr. Slowly increasing · Encourage po intake - if cr. Worse - we will start ivf tomorrow. - · Stop po bicarb · Avoid hypotension- adjust hydralazine dose · S/p iv iron · Continue epogen · Follow bmp · Hold clonidine,  
· Hold metolazone Physical exam:  
General: NAD Resp:  Clear lungs, no wheezing or rales. No accessory muscle use CV:  Regular  rhythm,  S1,S2 GI:  Soft, Non distended, Non tender. Neurologic:  Alert and oriented X 3. Non Focal 
Skin:  no rashes or ulcers. No jaundice Edema + Foot dressing + Left Foot dressing + Care Plan discussed with: patient, 
Objective:  
Visit Vitals BP (!) 148/6 (BP 1 Location: Left arm, BP Patient Position: At rest) Pulse 89 Temp 98.6 °F (37 °C) Resp 18 Ht 5' 9\" (1.753 m) Wt 108.2 kg (238 lb 8.6 oz) SpO2 100% BMI 35.23 kg/m² Last 3 Recorded Weights in this Encounter 03/17/20 0124 03/18/20 1162 03/19/20 6787 Weight: 106.7 kg (235 lb 5 oz) 107.5 kg (237 lb) 108.2 kg (238 lb 8.6 oz) 03/17 1901 - 03/19 0700 In: 1640 [P.O.:840; I.V.:800] Out: 1160 [TBLDE:7951] Intake/Output Summary (Last 24 hours) at 3/19/2020 0815 Last data filed at 3/19/2020 6728 Gross per 24 hour Intake 1640 ml Output 470 ml Net 1170 ml Chart reviewed. Pertinent Notes reviewed. Medication list  reviewed Current Facility-Administered Medications Medication  doxazosin (CARDURA) tablet 4 mg  hydrALAZINE (APRESOLINE) tablet 50 mg  levoFLOXacin (LEVAQUIN) 750 mg in D5W IVPB  metroNIDAZOLE (FLAGYL) IVPB premix 500 mg  
 alcohol 62% (NOZIN) nasal  1 Ampule  HYDROcodone-acetaminophen (NORCO) 5-325 mg per tablet 1 Tab  docusate sodium (COLACE) capsule 100 mg  
 HYDROmorphone (PF) (DILAUDID) injection 0.5 mg  
 sodium hypochlorite (HALF STRENGTH DAKIN'S) 0.25% irrigation (bottle)  dilTIAZem (CARDIZEM) IR tablet 60 mg  WARFARIN INFORMATION NOTE (COUMADIN)  sodium chloride (NS) flush 5-10 mL  busPIRone (BUSPAR) tablet 10 mg  
 cholecalciferol (VITAMIN D3) (1000 Units /25 mcg) tablet 5 Tab  cyanocobalamin (VITAMIN B12) tablet 1,000 mcg  metoprolol succinate (TOPROL-XL) XL tablet 100 mg  
 sertraline (ZOLOFT) tablet 50 mg  
 sodium chloride (NS) flush 5-40 mL  sodium chloride (NS) flush 5-40 mL  acetaminophen (TYLENOL) tablet 650 mg  
 naloxone (NARCAN) injection 0.4 mg  
 glucose chewable tablet 16 g  
 dextrose (D50W) injection syrg 12.5-25 g  
 glucagon (GLUCAGEN) injection 1 mg  
 insulin lispro (HUMALOG) injection Data Review : 
Recent Labs  
  03/19/20 
0135 03/18/20 
0401 03/17/20 
0055 * 136 136  
K 3.8 3.4* 3.4*  
 105 103 CO2 26 26 29 BUN 35* 36* 36* CREA 2.51* 2.38* 2.41* * 148* 230* CA 8.9 9.0 9.2 Recent Labs  
  03/18/20 
0401 WBC 10.5 HGB 9.2* HCT 30.0*  
 * No results for input(s): FE, TIBC, PSAT, FERR in the last 72 hours. No results for input(s): CPK, CKNDX, TROIQ in the last 72 hours. No lab exists for component: CPKMB Lab Results Component Value Date/Time Color YELLOW/STRAW 03/08/2020 05:00 PM  
 Appearance CLOUDY (A) 03/08/2020 05:00 PM  
 Specific gravity 1.019 03/08/2020 05:00 PM  
 pH (UA) 5.0 03/08/2020 05:00 PM  
 Protein 100 (A) 03/08/2020 05:00 PM  
 Glucose 100 (A) 03/08/2020 05:00 PM  
 Ketone 15 (A) 03/08/2020 05:00 PM  
 Bilirubin NEGATIVE  03/08/2020 05:00 PM  
 Urobilinogen 0.2 03/08/2020 05:00 PM  
 Nitrites NEGATIVE  03/08/2020 05:00 PM  
 Leukocyte Esterase SMALL (A) 03/08/2020 05:00 PM  
 Epithelial cells FEW 03/08/2020 05:00 PM  
 Bacteria NEGATIVE  03/08/2020 05:00 PM  
 WBC 0-4 03/08/2020 05:00 PM  
 RBC 0-5 03/08/2020 05:00 PM  
 
Lab Results Component Value Date/Time Culture result: (A) 03/10/2020 07:15 PM  
  HEAVY ANAEROBIC GRAM NEGATIVE RODS BETA LACTAMASE POSITIVE Culture result: LIGHT STAPHYLOCOCCUS AUREUS (A) 03/10/2020 07:15 PM  
 Culture result: SCANT PROTEUS MIRABILIS (A) 03/10/2020 07:15 PM  
 Culture result: (A) 03/10/2020 07:15 PM  
  HEAVY STREPTOCOCCI, BETA HEMOLYTIC GROUP B Penicillin and ampicillin are drugs of choice for treatment of beta-hemolytic streptococcal infections. Susceptibility testing of penicillins and beta-lactams approved by the FDA for treatment of beta-hemolytic streptococcal infections need not be performed routinely, because nonsusceptible isolates are extremely rare. CLSI 2012 Culture result: (A) 03/10/2020 07:15 PM  
  HEAVY STREPTOCOCCI, BETA HEMOLYTIC GROUP G Penicillin and ampicillin are drugs of choice for treatment of beta-hemolytic streptococcal infections.  Susceptibility testing of penicillins and beta-lactams approved by the FDA for treatment of beta-hemolytic streptococcal infections need not be performed routinely, because nonsusceptible isolates are extremely rare. CLSI 2012 Lab Results Component Value Date/Time Specimen Description: RENEE 04/09/2014 12:27 PM  
 Specimen Description: RENEE 10/28/2013 05:00 PM  
 Specimen Description: [facility] 10/28/2013 03:09 PM  
 
Lab Results Component Value Date/Time Creatinine, urine 45.70 03/09/2020 09:50 PM  
 
[facility]20 XR FOOT LT MIN 3 V Narrative EXAM: XR FOOT LT MIN 3 V 
 
INDICATION: L foot swelling; eval for osteo changes. COMPARISON: 3/3/2020. FINDINGS: Three views of the left foot demonstrate no bone erosion, periostitis 
or other evidence of osteomyelitis. There are 2 plantar soft tissue ulcers, 
without foreign body. There is chronic deformity of the distal portion of the second toe and mid foot 
osteoarthrosis. No acute fracture is seen. Arterial calcification is noted. Impression IMPRESSION: No signal change. Plantar soft tissue ulcers. No apparent 
osteomyelitis. RENAL USG 
TECHNIQUE:  
Routine ultrasound images of the kidneys and retroperitoneum were obtained. 
  
FINDINGS:  
The right kidney measures 11.5 cm in length. The left kidney measures 10.7 cm in 
length. There is increased echogenicity of the kidneys bilaterally, consistent with 
medical renal disease. There is thinning of the right renal cortex. No renal 
calculus is seen. There is no hydronephrosis. No renal cyst or solid mass is 
evident.   
  
The abdominal aorta and common iliac arteries were obscured by gas. The 
visualized portion of the IVC appears patent. The urinary bladder demonstrates 
no filling defect.  
  
IMPRESSION IMPRESSION:  
Echogenic kidneys, consistent with medical renal disease. Cortical thinning on 
the right. No hydronephrosis. . 
  
 
                Yuliya Flores MD 
[facility] 
 www. [facility] [employer] [address] 
 [place], [address] Phone - [phone] Fax - [phone]

## 2020-03-19 NOTE — PROGRESS NOTES
Bedside shift change report GIVEN TO Jim Barnard RN. Report included the following information SBAR. SIGNIFICANT CHANGES DURING SHIFT:  Patient was in better spirits today. She got up in the chair for a few hours and was talking with family members on the phone. Wound care was done to both lower extremities and patient had little pain during procedure. Up to the bathroom with minimal assistance and walker. Had a bowel movement CONCERNS TO ADDRESS WITH MD:  Patient to follow up with the podiatrist for grafting after INR comes down. She took the vitamin K tonight. No evidence of bleeding. [address] NURSING NOTE Admission Date 3/8/2020 Admission Diagnosis Atrial fibrillation with RVR ([address] [I48.91] Left leg cellulitis [D97.511] Consults IP CONSULT TO CARDIOLOGY 
IP CONSULT TO PODIATRY IP CONSULT TO NEPHROLOGY 
IP CONSULT TO NEUROLOGY Cardiac Monitoring [] Yes [] No  
  
Purposeful Hourly Rounding [] Yes   
Rayne Score Total Score: 2 Rayne score 3 or > [] Bed Alarm [] Avasys [] 1:1 sitter [] Patient refused (Signed refusal form in chart) Valdemar Score Valdemar Score: 15 Valdemar score 14 or < [] PMT consult [] Wound Care consult  
 []  Specialty bed  [] Nutrition consult Influenza Vaccine Received Flu Vaccine for Current Season (usually Sept-March): Yes Oxygen needs? [] Room air Oxygen @  []1L    []2L    []3L   []4L    []5L   []6L via NC Chronic home O2 use? [] Yes [] No 
Perform O2 challenge test and document in progress note using smartphrase (.Homeoxygen) Last bowel movement Last Bowel Movement Date: 03/18/20 Urinary Catheter External Female Catheter 03/10/20-Urine Output (mL): 200 ml LDAs Peripheral IV 03/17/20 Left Antecubital (Active) Site Assessment Clean, dry, & intact 3/18/2020  3:29 PM  
Phlebitis Assessment 0 3/18/2020  3:29 PM  
Infiltration Assessment 0 3/18/2020  3:29 PM  
Dressing Status Clean, dry, & intact 3/18/2020  3:47 AM  
 Dressing Type Tape;Transparent 3/18/2020  3:47 AM  
Hub Color/Line Status Blue 3/18/2020  3:47 AM  
Action Taken Blood drawn 3/17/2020  1:24 AM  
      
External Female Catheter 03/10/20 (Active) Site Assessment Clean, dry, & intact 3/18/2020 11:40 AM  
Repositioned Yes 3/18/2020 11:40 AM  
Perineal Care Yes 3/18/2020 11:40 AM  
Wick Changed Yes 3/18/2020  3:47 AM  
Suction Canister/Tubing Changed No 3/18/2020  3:47 AM  
Urine Output (mL) 200 ml 3/18/2020  1:33 PM  
            
  
Readmission Risk Assessment Tool Score High Risk [address] Total Score 3 Has Seen PCP in Last 6 Months (Yes=3, No=0) 2 . Living with Significant Other. Assisted Living. LTAC. SNF. or  
Rehab  
 3 Patient Length of Stay (>5 days = 3) 4 IP Visits Last 12 Months (1-3=4, 4=9, >4=11) 23 Charlson Comorbidity Score (Age + Comorbid Conditions) Criteria that do not apply:  
 Pt. Coverage (Medicare=5 , Medicaid, or Self-Pay=4) Expected Length of Stay 6d 12h Actual Length of Stay 10

## 2020-03-20 ENCOUNTER — ANESTHESIA EVENT (OUTPATIENT)
Dept: SURGERY | Age: 60
DRG: 623 | End: 2020-03-20
Payer: COMMERCIAL

## 2020-03-20 LAB
ANION GAP SERPL CALC-SCNC: 6 MMOL/L (ref 5–15)
BASOPHILS # BLD: 0.1 K/UL (ref 0–0.1)
BASOPHILS NFR BLD: 1 % (ref 0–1)
BUN SERPL-MCNC: 35 MG/DL (ref 6–20)
BUN/CREAT SERPL: 14 (ref 12–20)
CALCIUM SERPL-MCNC: 9.2 MG/DL (ref 8.5–10.1)
CHLORIDE SERPL-SCNC: 102 MMOL/L (ref 97–108)
CO2 SERPL-SCNC: 25 MMOL/L (ref 21–32)
CREAT SERPL-MCNC: 2.48 MG/DL (ref 0.55–1.02)
CREAT UR-MCNC: 125 MG/DL
DIFFERENTIAL METHOD BLD: ABNORMAL
EOSINOPHIL # BLD: 0.3 K/UL (ref 0–0.4)
EOSINOPHIL #/AREA URNS HPF: NEGATIVE /[HPF]
EOSINOPHIL NFR BLD: 2 % (ref 0–7)
ERYTHROCYTE [DISTWIDTH] IN BLOOD BY AUTOMATED COUNT: 16.2 % (ref 11.5–14.5)
GLUCOSE BLD STRIP.AUTO-MCNC: 127 MG/DL (ref 65–100)
GLUCOSE BLD STRIP.AUTO-MCNC: 183 MG/DL (ref 65–100)
GLUCOSE BLD STRIP.AUTO-MCNC: 220 MG/DL (ref 65–100)
GLUCOSE BLD STRIP.AUTO-MCNC: 228 MG/DL (ref 65–100)
GLUCOSE SERPL-MCNC: 167 MG/DL (ref 65–100)
HCT VFR BLD AUTO: 31.7 % (ref 35–47)
HGB BLD-MCNC: 9.6 G/DL (ref 11.5–16)
IMM GRANULOCYTES # BLD AUTO: 0.2 K/UL (ref 0–0.04)
IMM GRANULOCYTES NFR BLD AUTO: 1 % (ref 0–0.5)
INR PPP: 1.4 (ref 0.9–1.1)
LYMPHOCYTES # BLD: 1.1 K/UL (ref 0.8–3.5)
LYMPHOCYTES NFR BLD: 9 % (ref 12–49)
MCH RBC QN AUTO: 28.9 PG (ref 26–34)
MCHC RBC AUTO-ENTMCNC: 30.3 G/DL (ref 30–36.5)
MCV RBC AUTO: 95.5 FL (ref 80–99)
MONOCYTES # BLD: 1.2 K/UL (ref 0–1)
MONOCYTES NFR BLD: 10 % (ref 5–13)
NEUTS SEG # BLD: 10 K/UL (ref 1.8–8)
NEUTS SEG NFR BLD: 77 % (ref 32–75)
NRBC # BLD: 0 K/UL (ref 0–0.01)
NRBC BLD-RTO: 0 PER 100 WBC
PLATELET # BLD AUTO: 459 K/UL (ref 150–400)
PMV BLD AUTO: 10.1 FL (ref 8.9–12.9)
POTASSIUM SERPL-SCNC: 3.9 MMOL/L (ref 3.5–5.1)
PROTHROMBIN TIME: 14.6 SEC (ref 9–11.1)
RBC # BLD AUTO: 3.32 M/UL (ref 3.8–5.2)
SERVICE CMNT-IMP: ABNORMAL
SODIUM SERPL-SCNC: 133 MMOL/L (ref 136–145)
SODIUM UR-SCNC: 19 MMOL/L
UUN UR-MCNC: 572 MG/DL
WBC # BLD AUTO: 12.8 K/UL (ref 3.6–11)

## 2020-03-20 PROCEDURE — 84540 ASSAY OF URINE/UREA-N: CPT

## 2020-03-20 PROCEDURE — 74011250637 HC RX REV CODE- 250/637: Performed by: INTERNAL MEDICINE

## 2020-03-20 PROCEDURE — 82570 ASSAY OF URINE CREATININE: CPT

## 2020-03-20 PROCEDURE — 74011636637 HC RX REV CODE- 636/637: Performed by: INTERNAL MEDICINE

## 2020-03-20 PROCEDURE — 85025 COMPLETE CBC W/AUTO DIFF WBC: CPT

## 2020-03-20 PROCEDURE — 74011636637 HC RX REV CODE- 636/637: Performed by: PODIATRIST

## 2020-03-20 PROCEDURE — 87205 SMEAR GRAM STAIN: CPT

## 2020-03-20 PROCEDURE — 85610 PROTHROMBIN TIME: CPT

## 2020-03-20 PROCEDURE — 74011250637 HC RX REV CODE- 250/637: Performed by: FAMILY MEDICINE

## 2020-03-20 PROCEDURE — 36415 COLL VENOUS BLD VENIPUNCTURE: CPT

## 2020-03-20 PROCEDURE — 74011250637 HC RX REV CODE- 250/637: Performed by: PODIATRIST

## 2020-03-20 PROCEDURE — 84300 ASSAY OF URINE SODIUM: CPT

## 2020-03-20 PROCEDURE — 74011250636 HC RX REV CODE- 250/636: Performed by: INTERNAL MEDICINE

## 2020-03-20 PROCEDURE — 82962 GLUCOSE BLOOD TEST: CPT

## 2020-03-20 PROCEDURE — 65660000000 HC RM CCU STEPDOWN

## 2020-03-20 PROCEDURE — 80048 BASIC METABOLIC PNL TOTAL CA: CPT

## 2020-03-20 RX ORDER — INSULIN GLARGINE 100 [IU]/ML
10 INJECTION, SOLUTION SUBCUTANEOUS
Status: DISCONTINUED | OUTPATIENT
Start: 2020-03-20 | End: 2020-03-20

## 2020-03-20 RX ORDER — INSULIN GLARGINE 100 [IU]/ML
10 INJECTION, SOLUTION SUBCUTANEOUS
Status: DISCONTINUED | OUTPATIENT
Start: 2020-03-20 | End: 2020-03-22 | Stop reason: HOSPADM

## 2020-03-20 RX ADMIN — Medication 1 AMPULE: at 08:48

## 2020-03-20 RX ADMIN — DILTIAZEM HYDROCHLORIDE 60 MG: 60 TABLET, FILM COATED ORAL at 08:49

## 2020-03-20 RX ADMIN — Medication 10 ML: at 05:52

## 2020-03-20 RX ADMIN — HYDRALAZINE HYDROCHLORIDE 50 MG: 50 TABLET, FILM COATED ORAL at 00:34

## 2020-03-20 RX ADMIN — METOPROLOL SUCCINATE 100 MG: 50 TABLET, EXTENDED RELEASE ORAL at 08:49

## 2020-03-20 RX ADMIN — HYDRALAZINE HYDROCHLORIDE 50 MG: 50 TABLET, FILM COATED ORAL at 13:26

## 2020-03-20 RX ADMIN — INSULIN LISPRO 2 UNITS: 100 INJECTION, SOLUTION INTRAVENOUS; SUBCUTANEOUS at 08:47

## 2020-03-20 RX ADMIN — METRONIDAZOLE 500 MG: 500 INJECTION, SOLUTION INTRAVENOUS at 00:35

## 2020-03-20 RX ADMIN — DILTIAZEM HYDROCHLORIDE 60 MG: 60 TABLET, FILM COATED ORAL at 10:56

## 2020-03-20 RX ADMIN — DOXAZOSIN MESYLATE 4 MG: 2 TABLET ORAL at 00:36

## 2020-03-20 RX ADMIN — HYDRALAZINE HYDROCHLORIDE 50 MG: 50 TABLET, FILM COATED ORAL at 05:52

## 2020-03-20 RX ADMIN — Medication 10 ML: at 00:36

## 2020-03-20 RX ADMIN — INSULIN LISPRO 3 UNITS: 100 INJECTION, SOLUTION INTRAVENOUS; SUBCUTANEOUS at 12:30

## 2020-03-20 RX ADMIN — Medication 10 ML: at 22:01

## 2020-03-20 RX ADMIN — SERTRALINE HYDROCHLORIDE 50 MG: 50 TABLET ORAL at 08:50

## 2020-03-20 RX ADMIN — ACETAMINOPHEN 650 MG: 325 TABLET ORAL at 13:26

## 2020-03-20 RX ADMIN — INSULIN GLARGINE 10 UNITS: 100 INJECTION, SOLUTION SUBCUTANEOUS at 21:56

## 2020-03-20 RX ADMIN — Medication 10 ML: at 14:30

## 2020-03-20 RX ADMIN — HYDROCODONE BITARTRATE AND ACETAMINOPHEN 1 TABLET: 5; 325 TABLET ORAL at 23:04

## 2020-03-20 RX ADMIN — CYANOCOBALAMIN TAB 500 MCG 1000 MCG: 500 TAB at 08:51

## 2020-03-20 RX ADMIN — HYOSCYAMINE SULFATE: 16 SOLUTION at 22:01

## 2020-03-20 RX ADMIN — DILTIAZEM HYDROCHLORIDE 60 MG: 60 TABLET, FILM COATED ORAL at 16:54

## 2020-03-20 RX ADMIN — Medication 1 AMPULE: at 22:03

## 2020-03-20 RX ADMIN — HYDRALAZINE HYDROCHLORIDE 50 MG: 50 TABLET, FILM COATED ORAL at 21:57

## 2020-03-20 RX ADMIN — Medication 1 AMPULE: at 00:33

## 2020-03-20 RX ADMIN — DOXAZOSIN MESYLATE 4 MG: 2 TABLET ORAL at 21:57

## 2020-03-20 RX ADMIN — MELATONIN 5 TABLET: at 08:51

## 2020-03-20 RX ADMIN — BUSPIRONE HYDROCHLORIDE 10 MG: 10 TABLET ORAL at 08:50

## 2020-03-20 RX ADMIN — INSULIN LISPRO 2 UNITS: 100 INJECTION, SOLUTION INTRAVENOUS; SUBCUTANEOUS at 21:56

## 2020-03-20 NOTE — PROGRESS NOTES
Attempted to see pt for PT tx. She declined to mobilize OOB today because she was cold and did not feel well despite increased education.

## 2020-03-20 NOTE — PROGRESS NOTES
TEE: 
1. Home health vs. SNF  
2. OP f/u appts - PCP, Podiatry, Nephrology 3. Possible wound vac and home IV ABX 
  
 
Patient to have surgery tomorrow. CM will continue to follow. Ammy Carreno [phone]

## 2020-03-20 NOTE — PROGRESS NOTES
Bedside and Verbal shift change report given to Deidra Cai (oncoming nurse) by Renee Malone (offgoing nurse). Report included the following information SBAR, Kardex, MAR and Recent Results.

## 2020-03-20 NOTE — PROGRESS NOTES
Podiatry Subjective: Pt awake in bed. No new complaints. Patient is a 60 y.o.  female who is being seen for ulcer left foot. Patient Active Problem List  
 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 ([address] 03/03/2020  Traumatic hematoma of foot, left, initial encounter 02/25/2020  Type 2 diabetes mellitus with left diabetic foot infection ([address] 10/29/2019  Foot deformity, acquired, left 09/24/2019  Foot deformity, right 09/24/2019  Pes cavus 09/24/2019  Cellulitis of right lower extremity 08/09/2019  Diabetic ulcer of right midfoot associated with type 2 diabetes mellitus, with fat layer exposed ([address] 08/06/2019  Diabetic ulcer of left heel associated with type 2 diabetes mellitus, with fat layer exposed ([address] 06/21/2019  Open breast wound, left, initial encounter 06/07/2019  Venous stasis ulcer of right lower leg with edema of right lower leg (HCC) 11/14/2018  Diabetic polyneuropathy associated with type 2 diabetes mellitus ([address] 11/13/2018  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, associated with type 2 diabetes mellitus ([address] 04/25/2018  Morbid obesity with BMI of 40.0-44.9, adult ([address] 05/01/2017  Controlled type 2 diabetes mellitus with stage 4 chronic kidney disease, with long-term current use of insulin ([address] 01/16/2017  PAF (paroxysmal atrial fibrillation) ([address] 11/28/2016  Anemia in stage 4 chronic kidney disease ([address] 11/28/2016  Essential hypertension 08/26/2016  Systolic murmur 33/82/0276  CKD (chronic kidney disease), stage IV ([address] 06/09/2012  Mixed hyperlipidemia 05/22/2012  Long term (current) use of anticoagulants 04/11/2012  S/P cardiac pacemaker procedure 04/03/2012  Sick sinus syndrome ([facility] 04/02/2012  Status post ablation of atrial fibrillation 12/15/2011  Fe deficiency anemia 04/14/2010  
 History of breast cancer 04/13/2010  Asthma 04/13/2010 Past Medical History:  
Diagnosis Date  Acquired lymphedema Right arm  Arrhythmia  Asthma  Atrial fibrillation ([facility] Dr[facility] Gregoria Johnston [facility]  Cancer ([facility] bilateral breast  
 Chronic kidney disease  Diabetes mellitus type II   
 Dizziness  Essential hypertension  Hyperlipidemia  Hypertension  Long term (current) use of anticoagulants  Morbid obesity ([facility] 3/14/2012  Nausea & vomiting  Osteoarthritis  Pacemaker  Sick sinus syndrome ([facility] Past Surgical History:  
Procedure Laterality Date  ABDOMEN SURGERY PROC UNLISTED    
 gastric bypass  GASTRIC BYPASS,OBESE<150CM SAW-EN-Y  7/08  
 [facility]  HX AFIB ABLATION    
 HX CARPAL TUNNEL RELEASE [address] RIGHT MODIFIED RADICAL   
 HX MOHS PROCEDURES  1995  
 right  HX ORTHOPAEDIC Right   
 knee surgery  HX PACEMAKER    
 IR INSERT TUNL CVC W PORT OVER 5 YEARS    
 LAMINECTOMY,CERVICAL  1994  
 NEEDLE BIOPSY LIVER,W OTHR [address]  8.21.07  
 [place] [address] AND 1994  [place] TRABECULOPLASTY BY LASER SURGERY    
 US GUIDE ASP OVARIAN CYST ABD APPROACH  8.21.07  
 RIGHT Family History Problem Relation Age of Onset  Cancer Mother  Heart Disease Mother  Alcohol abuse Father  Diabetes Brother  Hypertension Brother Social History Tobacco Use  Smoking status: Never Smoker  Smokeless tobacco: Never Used Substance Use Topics  Alcohol use: Yes Comment: rare Allergies Allergen Reactions  Ace Inhibitors Cough  Amlodipine Swelling  Avapro [Irbesartan] Unable to Obtain  Bactrim [Sulfamethoxazole-Trimethoprim] Other (comments) Sore mouth  Captopril Cough  Carvedilol Diarrhea [address]  Morphine Nausea and Vomiting and Swelling  Penicillins Hives Tolerated cefepime 1/2020  Pravachol [Pravastatin] Nausea Only  Seafood [Shellfish Containing Products] Hives  Singulair [Montelukast] Other (comments)  
  palpitations Prior to Admission medications Medication Sig Start Date End Date Taking? Authorizing Provider  
warfarin (COUMADIN) 4 mg tablet Take 2 mg by mouth five (5) days a week. Take 1 tablet (4 mg) on Sun and Wed and a half tablet (2 mg) the other 5 days. Yes Provider, Historical  
warfarin (COUMADIN) 4 mg tablet Take 4 mg by mouth two (2) times a week. Take 1 tablet (4 mg) on Sun and Wed and a half tablet (2 mg) the other 5 days. Yes Provider, Historical  
acetaminophen 500 mg tab 500 mg, diphenhydrAMINE 25 mg cap 25 mg Take 2 Doses by mouth nightly. Provider, Historical  
potassium chloride SR (KLOR-CON 10) 10 mEq tablet Take 10 mEq by mouth daily. Indications: Patient states she intends to take this medication untl she discusses with Dr. Hussain Hobson on 12-12-19. Provider, Historical  
insulin glargine (LANTUS U-100 INSULIN) 100 unit/mL injection Inject 20 units daily 11/10/19   Forest Alba MD  
hydrALAZINE (APRESOLINE) 100 mg tablet TAKE ONE TABLET BY MOUTH THREE TIMES A DAY 10/21/19   Forest Alba MD  
sertraline (ZOLOFT) 50 mg tablet TAKE ONE AND ONE-HALF (1 & 1/2) TABLET BY MOUTH DAILY 8/22/19   Forest Alba MD  
bumetanide (BUMEX) 1 mg tablet Take 2 mg by mouth daily. Provider, Historical  
cloNIDine HCl (CATAPRES) 0.3 mg tablet Take 0.6 mg by mouth nightly.     Provider, Historical  
metoprolol succinate (TOPROL-XL) 100 mg tablet TAKE TWO TABLETS BY MOUTH DAILY 7/12/19   Forest Alba MD  
 doxazosin (CARDURA) 4 mg tablet TAKE ONE TABLET BY MOUTH ONCE NIGHTLY 19   Chyna Brooks MD  
busPIRone (BUSPAR) 10 mg tablet TAKE ONE TABLET BY MOUTH TWICE A DAY 19   Chyna Brooks MD  
metolazone (ZAROXOLYN) 5 mg tablet Take 1 Tab by mouth daily as needed (take if develop increased LE edema, weight gain > 5 pounds. ). 4/10/14   Anthony Pump, NP  
cholecalciferol, VITAMIN D3, (VITAMIN D3) 5,000 unit tab tablet Take 5,000 Units by mouth daily. Provider, Historical  
vitamin A 8,000 unit capsule Take 8,000 Units by mouth daily. Provider, Historical  
insulin lispro (HUMALOG) 100 unit/mL kwikpen 2 units with dinner for sugars over 200 1/3/14   Chyna Brooks MD  
cyanocobalamin (VITAMIN B-12) 1,000 mcg sublingual tablet Take 1,000 mcg by mouth daily. Provider, Historical  
 
 
Review of Systems AO x3. NAD. Objective:  
 
Patient Vitals for the past 8 hrs: 
 BP Temp Pulse Resp SpO2  
20 1513 123/54 98.2 °F (36.8 °C) 87 18 99 % Temp (24hrs), Av.1 °F (36.7 °C), Min:97.8 °F (36.6 °C), Max:98.3 °F (36.8 °C) Physical Exam Lower Extremity Dressings removed left foot. Fibrotic base to wounds x3 left foot, exposed 5th metatarsal laterally. DP and PT 2/4; CFT less than 3 seconds Sensation intact to touch Data Review:  
Recent Results (from the past 24 hour(s)) GLUCOSE, POC Collection Time: 20  9:50 PM  
Result Value Ref Range Glucose (POC) 192 (H) 65 - 100 mg/dL Performed by [place] WALLER) METABOLIC PANEL, BASIC Collection Time: 20 12:52 AM  
Result Value Ref Range Sodium 133 (L) 136 - 145 mmol/L Potassium 3.9 3.5 - 5.1 mmol/L Chloride 102 97 - 108 mmol/L  
 CO2 25 21 - 32 mmol/L Anion gap 6 5 - 15 mmol/L Glucose 167 (H) 65 - 100 mg/dL BUN 35 (H) 6 - 20 MG/DL Creatinine 2.48 (H) 0.55 - 1.02 MG/DL  
 BUN/Creatinine ratio 14 12 - 20 GFR est AA 24 (L) >60 ml/min/1.73m2 GFR est non-AA 20 (L) >60 ml/min/1.73m2 Calcium 9.2 8.5 - 10.1 MG/DL PROTHROMBIN TIME + INR Collection Time: 03/20/20 12:52 AM  
Result Value Ref Range INR 1.4 (H) 0.9 - 1.1 Prothrombin time 14.6 (H) 9.0 - 11.1 sec CBC WITH AUTOMATED DIFF Collection Time: 03/20/20 12:52 AM  
Result Value Ref Range WBC 12.8 (H) 3.6 - 11.0 K/uL  
 RBC 3.32 (L) 3.80 - 5.20 M/uL HGB 9.6 (L) 11.5 - 16.0 g/dL HCT 31.7 (L) 35.0 - 47.0 % MCV 95.5 80.0 - 99.0 FL  
 MCH 28.9 26.0 - 34.0 PG  
 MCHC 30.3 30.0 - 36.5 g/dL  
 RDW 16.2 (H) 11.5 - 14.5 % PLATELET 983 (H) 051 - 400 K/uL MPV 10.1 8.9 - 12.9 FL  
 NRBC 0.0 0  WBC ABSOLUTE NRBC 0.00 0.00 - 0.01 K/uL NEUTROPHILS 77 (H) 32 - 75 % LYMPHOCYTES 9 (L) 12 - 49 % MONOCYTES 10 5 - 13 % EOSINOPHILS 2 0 - 7 % BASOPHILS 1 0 - 1 % IMMATURE GRANULOCYTES 1 (H) 0.0 - 0.5 % ABS. NEUTROPHILS 10.0 (H) 1.8 - 8.0 K/UL  
 ABS. LYMPHOCYTES 1.1 0.8 - 3.5 K/UL  
 ABS. MONOCYTES 1.2 (H) 0.0 - 1.0 K/UL  
 ABS. EOSINOPHILS 0.3 0.0 - 0.4 K/UL  
 ABS. BASOPHILS 0.1 0.0 - 0.1 K/UL  
 ABS. IMM. GRANS. 0.2 (H) 0.00 - 0.04 K/UL  
 DF AUTOMATED    
GLUCOSE, POC Collection Time: 03/20/20  8:11 AM  
Result Value Ref Range Glucose (POC) 183 (H) 65 - 100 mg/dL Performed by Kartik Leyva (PCT) GLUCOSE, POC Collection Time: 03/20/20 11:36 AM  
Result Value Ref Range Glucose (POC) 228 (H) 65 - 100 mg/dL Performed by Kartik Leyva (PCT) SODIUM, UR, RANDOM Collection Time: 03/20/20  1:20 PM  
Result Value Ref Range Sodium,urine random 19 MMOL/L  
CREATININE, UR, RANDOM Collection Time: 03/20/20  1:20 PM  
Result Value Ref Range Creatinine, urine 125.00 mg/dL UREA NITROGEN, UR, RANDOM Collection Time: 03/20/20  1:20 PM  
Result Value Ref Range Urea Nitrogen,urine random 572 MG/DL  
GLUCOSE, POC Collection Time: 03/20/20  4:23 PM  
Result Value Ref Range Glucose (POC) 127 (H) 65 - 100 mg/dL Performed by Abiola Hyman (PCT) Impression:  
Diabetic ulcer left foot, bone Recommendation:  
Pt to OR tomorrow for debridement and graft placement. Assuming no complications or excessive bleeding, she could be discharged at any time afterwards.  Case scheduled for 11 am.

## 2020-03-20 NOTE — ROUTINE PROCESS
General Surgery End of Shift Nursing Note Bedside shift change report given to Berhane Cooper RN (oncoming nurse) by Jose Quinones RN (offgoing nurse). Report included the following information SBAR, Kardex, Intake/Output and MAR. Shift worked:   C Summary of shift:    Podiatry completed wound care. Pt to be NPO at MN. Surgery tomorrow at 1130. Issues for physician to address:     
 
Number times ambulated in hallway past shift: 0 Number of times OOB to chair past shift: 0 Pain Management: 
Current medication:  
Patient states pain is manageable on current pain medication: YES 
 
GI: 
 
Current diet:  DIET DIABETIC CONSISTENT CARB Regular DIET NUTRITIONAL SUPPLEMENTS Lunch, Breakfast, Dinner; Glucerna Shake DIET ONE TIME MESSAGE 
DIET NPO Except Meds Tolerating current diet: YES Passing flatus: NO 
Last Bowel Movement: several days ago Appearance:  
 
Respiratory: 
 
Incentive Spirometer at bedside: YES Patient instructed on use: YES Patient Safety: 
 
Bed Alarm On? No 
Sitter?  No 
 
Estrella Farrell RN

## 2020-03-20 NOTE — DIABETES MGMT
[facility] [facility] 
 
FOLLOW-UP NOTE Presentation This 59 yo WF was admitted 3/8/20 from the ER with complaints of left leg pain, swelling and redness. Also noted to be in afib with RVR. DX: Cellulitis. Treated with ABx. Afebrile. Local treatment. Continues to await surgical procedure as clotting factors not in range. 
  
Diabetes: Patient with known Type 2 diabetes, treated with basal/bolus insulin strategy PTA. A1c 7.5%.  
 
Subjective Sleeping Objective Physical exam 
Vital Signs Visit Vitals /73 Pulse 87 Temp 98.3 °F (36.8 °C) Resp 18 Ht 5' 9\" (1.753 m) Wt 108.2 kg (238 lb 8.6 oz) SpO2 98% BMI 35.23 kg/m² Laboratory Lab Results Component Value Date/Time Hemoglobin A1c 7.5 (H) 03/08/2020 07:09 PM  
 Hemoglobin A1c (POC) 7.2 07/10/2019 03:14 PM  
 
Lab Results Component Value Date/Time LDL, calculated 82 11/11/2019 08:47 AM  
 
Lab Results Component Value Date/Time Creatinine (POC) 1.6 (H) 09/09/2011 08:34 AM  
 Creatinine 2.48 (H) 03/20/2020 12:52 AM  
 
Lab Results Component Value Date/Time Sodium 133 (L) 03/20/2020 12:52 AM  
 Potassium 3.9 03/20/2020 12:52 AM  
 Chloride 102 03/20/2020 12:52 AM  
 CO2 25 03/20/2020 12:52 AM  
 Anion gap 6 03/20/2020 12:52 AM  
 Glucose 167 (H) 03/20/2020 12:52 AM  
 BUN 35 (H) 03/20/2020 12:52 AM  
 Creatinine 2.48 (H) 03/20/2020 12:52 AM  
 BUN/Creatinine ratio 14 03/20/2020 12:52 AM  
 GFR est AA 24 (L) 03/20/2020 12:52 AM  
 GFR est non-AA 20 (L) 03/20/2020 12:52 AM  
 Calcium 9.2 03/20/2020 12:52 AM  
 Bilirubin, total 0.5 03/08/2020 11:25 AM  
 AST (SGOT) 22 03/08/2020 11:25 AM  
 Alk. phosphatase 100 03/08/2020 11:25 AM  
 Protein, total 6.2 03/10/2020 01:58 AM  
 Albumin 2.8 (L) 03/08/2020 11:25 AM  
 Globulin 4.7 (H) 03/08/2020 11:25 AM  
 A-G Ratio 0.6 (L) 03/08/2020 11:25 AM  
 ALT (SGPT) 16 03/08/2020 11:25 AM  
 
Lab Results Component Value Date/Time ALT (SGPT) 16 03/08/2020 11:25 AM  
 
 
Blood glucose pattern BGs rising. Assessment and Plan Nursing Diagnosis Risk for unstable blood glucose pattern Nursing Intervention Domain 5728 Decision-making Support Nursing Interventions Examined current inpatient diabetes control Explored factors facilitating and impeding inpatient management Evaluation This woman, with known Type 2 diabetes, had achieved inpatient blood glucose target of 100-180mg/dl on home regimen until event on 3/13/20. No basal insulin 3/14/-3/16/20. Now BGs gradually rising into 180-200s, despite receiving additional 7 units of corrective insulin for the past 2 days. Recommendations Recommend: 
 
Again, would restart basal insulin = Lantus insulin 10 units D Billing Code(s) [x] 54887 IP subsequent hospital care - 15 minutes DARRYL Saeed Access via MIGUEL Castro [phone]

## 2020-03-20 NOTE — PROGRESS NOTES
Nephrology Progress Note [facility] 
[email or web address]                  Phone - [phone] Patient: Charbel Sheehan Date- 3/20/2020 Admit Date: 3/8/2020 YOB: 1960 CC: Follow up for PARAG,, ckd Subjective: Interval History:  
- parag/ cr. IMPROVED Na slightly low- Total intake 1600 ml 
 
 
bumex was stopped on 3- No C/O of chest pain,SOB, vomiting, abdominal pain,fever,chills ROS:- as above IMPRESSION:  
· PARAG - likely due to dehydration- other possible etiology is due to SEPSIS , AIN, ATN due to abx - ( she received multiple abx since admission including vanco. ) · ckd  3 LIKELY due to DM nephropathy and HTN nephrosclerosis- bl. Cr. 1.8-2.1 · Hypokalemia · Hyponatremia due to fluid overload · edema · hypertension · Diabetic foot · Proteinuria likely due to DM nephropathy and HTN nephroscl- urine pr/cr 1.5 g/g · Metabolic acidosis · anemia iron defi - iron sats 10% · Vit d defi PLAN:  
· Hold bumex today · We may have to start bumex tomorrow if na continue to drop- start bumex 1mg tomorrow if cr. Stable and na low · Avoid hypotension- adjust hydralazine dose · S/p iv iron · Continue epogen · Follow bmp · Hold metolazone Physical exam:  
GEN:  NAD NECK:  Supple, no thyromegaly RESP: CTA  b/l, no  wheezing, CVS: RRR,S1,S2 ABDO:  soft , non tender, No mass NEURO: non focal, normal speech EXT: Edema +nt Foot dressing + Left Foot dressing + Care Plan discussed with: patient, 
Objective:  
Visit Vitals /73 Pulse 87 Temp 98.3 °F (36.8 °C) Resp 18 Ht 5' 9\" (1.753 m) Wt 108.2 kg (238 lb 8.6 oz) SpO2 98% BMI 35.23 kg/m² Last 3 Recorded Weights in this Encounter 03/17/20 0124 03/18/20 9840 03/19/20 0350 Weight: 106.7 kg (235 lb 5 oz) 107.5 kg (237 lb) 108.2 kg (238 lb 8.6 oz) 03/18 1901 - 03/20 0700 In: 450 [P.O.:450] Out: 270 [Urine:270] No intake or output data in the 24 hours ending 03/20/20 0911 Chart reviewed. Pertinent Notes reviewed. Medication list  reviewed Current Facility-Administered Medications Medication  levoFLOXacin (LEVAQUIN) 750 mg in D5W IVPB  doxazosin (CARDURA) tablet 4 mg  hydrALAZINE (APRESOLINE) tablet 50 mg  
 alcohol 62% (NOZIN) nasal  1 Ampule  HYDROcodone-acetaminophen (NORCO) 5-325 mg per tablet 1 Tab  docusate sodium (COLACE) capsule 100 mg  
 HYDROmorphone (PF) (DILAUDID) injection 0.5 mg  
 sodium hypochlorite (HALF STRENGTH DAKIN'S) 0.25% irrigation (bottle)  dilTIAZem (CARDIZEM) IR tablet 60 mg  WARFARIN INFORMATION NOTE (COUMADIN)  busPIRone (BUSPAR) tablet 10 mg  
 cholecalciferol (VITAMIN D3) (1000 Units /25 mcg) tablet 5 Tab  cyanocobalamin (VITAMIN B12) tablet 1,000 mcg  metoprolol succinate (TOPROL-XL) XL tablet 100 mg  
 sertraline (ZOLOFT) tablet 50 mg  
 sodium chloride (NS) flush 5-40 mL  sodium chloride (NS) flush 5-40 mL  acetaminophen (TYLENOL) tablet 650 mg  
 naloxone (NARCAN) injection 0.4 mg  
 glucose chewable tablet 16 g  
 dextrose (D50W) injection syrg 12.5-25 g  
 glucagon (GLUCAGEN) injection 1 mg  
 insulin lispro (HUMALOG) injection Data Review : 
Recent Labs  
  03/20/20 0052 03/19/20 
0135 03/18/20 
0401 * 135* 136  
K 3.9 3.8 3.4*  
 105 105 CO2 25 26 26 BUN 35* 35* 36* CREA 2.48* 2.51* 2.38* * 177* 148* CA 9.2 8.9 9.0 Recent Labs  
  03/20/20 
0052 03/18/20 
0401 WBC 12.8* 10.5 HGB 9.6* 9.2* HCT 31.7* 30.0*  
* 520* No results for input(s): FE, TIBC, PSAT, FERR in the last 72 hours. No results for input(s): CPK, CKNDX, TROIQ in the last 72 hours. No lab exists for component: CPKMB Lab Results Component Value Date/Time  Color YELLOW/STRAW 03/08/2020 05:00 PM  
 Appearance CLOUDY (A) 03/08/2020 05:00 PM  
 Specific gravity 1.019 03/08/2020 05:00 PM  
 pH (UA) 5.0 03/08/2020 05:00 PM  
 Protein 100 (A) 03/08/2020 05:00 PM  
 Glucose 100 (A) 03/08/2020 05:00 PM  
 Ketone 15 (A) 03/08/2020 05:00 PM  
 Bilirubin NEGATIVE  03/08/2020 05:00 PM  
 Urobilinogen 0.2 03/08/2020 05:00 PM  
 Nitrites NEGATIVE  03/08/2020 05:00 PM  
 Leukocyte Esterase SMALL (A) 03/08/2020 05:00 PM  
 Epithelial cells FEW 03/08/2020 05:00 PM  
 Bacteria NEGATIVE  03/08/2020 05:00 PM  
 WBC 0-4 03/08/2020 05:00 PM  
 RBC 0-5 03/08/2020 05:00 PM  
 
Lab Results Component Value Date/Time Culture result: (A) 03/10/2020 07:15 PM  
  HEAVY ANAEROBIC GRAM NEGATIVE RODS BETA LACTAMASE POSITIVE Culture result: LIGHT STAPHYLOCOCCUS AUREUS (A) 03/10/2020 07:15 PM  
 Culture result: SCANT PROTEUS MIRABILIS (A) 03/10/2020 07:15 PM  
 Culture result: (A) 03/10/2020 07:15 PM  
  HEAVY STREPTOCOCCI, BETA HEMOLYTIC GROUP B Penicillin and ampicillin are drugs of choice for treatment of beta-hemolytic streptococcal infections. Susceptibility testing of penicillins and beta-lactams approved by the FDA for treatment of beta-hemolytic streptococcal infections need not be performed routinely, because nonsusceptible isolates are extremely rare. CLSI 2012 Culture result: (A) 03/10/2020 07:15 PM  
  HEAVY STREPTOCOCCI, BETA HEMOLYTIC GROUP G Penicillin and ampicillin are drugs of choice for treatment of beta-hemolytic streptococcal infections. Susceptibility testing of penicillins and beta-lactams approved by the FDA for treatment of beta-hemolytic streptococcal infections need not be performed routinely, because nonsusceptible isolates are extremely rare. CLSI 2012 Lab Results Component Value Date/Time Specimen Description: RENEE 04/09/2014 12:27 PM  
 Specimen Description: RADHAMAREK 10/28/2013 05:00 PM  
 Specimen Description: [facility] 10/28/2013 03:09 PM  
 
Lab Results Component Value Date/Time  Creatinine, urine 45.70 03/09/2020 09:50 PM  
 
 [place] [facility] [place]/08/20 XR FOOT LT MIN 3 V Narrative EXAM: XR FOOT LT MIN 3 V 
 
INDICATION: L foot swelling; eval for osteo changes. COMPARISON: 3/3/2020. FINDINGS: Three views of the left foot demonstrate no bone erosion, periostitis 
or other evidence of osteomyelitis. There are 2 plantar soft tissue ulcers, 
without foreign body. There is chronic deformity of the distal portion of the second toe and mid foot 
osteoarthrosis. No acute fracture is seen. Arterial calcification is noted. Impression IMPRESSION: No signal change. Plantar soft tissue ulcers. No apparent 
osteomyelitis. RENAL USG 
TECHNIQUE:  
Routine ultrasound images of the kidneys and retroperitoneum were obtained. 
  
FINDINGS:  
The right kidney measures 11.5 cm in length. The left kidney measures 10.7 cm in 
length. There is increased echogenicity of the kidneys bilaterally, consistent with 
medical renal disease. There is thinning of the right renal cortex. No renal 
calculus is seen. There is no hydronephrosis. No renal cyst or solid mass is 
evident.   
  
The abdominal aorta and common iliac arteries were obscured by gas. The 
visualized portion of the IVC appears patent. The urinary bladder demonstrates 
no filling defect.  
  
IMPRESSION IMPRESSION:  
Echogenic kidneys, consistent with medical renal disease. Cortical thinning on 
the right. No hydronephrosis. . 
  
 
                Tomer Agrawal MD 
[facility] 
 www. [facility] Jennifer / [employer] [address] [place], [address] Phone - [phone] Fax - [phone]

## 2020-03-20 NOTE — PROGRESS NOTES
The purpose of the visit was in response to request from a patient for a pre-procedural prayer. Mrs. Maria Alejandra Nicolas was pleasant and engaging during the visit. Mrs. Maria Alejandra Nicolas shared that she does have any anxiety about her surgery only peace. She expressed how comforting the visit from the  was on Thursday. She mentioned that the  was able to share with her in her sacred space. Mrs. Maria Alejandra Nicolas mentioned how appreciative she was for the visit. The  provided the patient a safe and sacred space for reflection concerning spiritual concerns. At the patient's request the  extended the prayer of comfort and care, tranquility and peace. [address] [facility] [address] Siegel.  paging service [phone])

## 2020-03-20 NOTE — PROGRESS NOTES
Podiatry Subjective: Pt in bed. Relates increased left foot pain after PT. Patient is a 60 y.o.  female who is being seen for diabetic ulcer left foot. Patient Active Problem List  
 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 ([address] 03/03/2020  Traumatic hematoma of foot, left, initial encounter 02/25/2020  Type 2 diabetes mellitus with left diabetic foot infection ([address] 10/29/2019  Foot deformity, acquired, left 09/24/2019  Foot deformity, right 09/24/2019  Pes cavus 09/24/2019  Cellulitis of right lower extremity 08/09/2019  Diabetic ulcer of right midfoot associated with type 2 diabetes mellitus, with fat layer exposed ([address] 08/06/2019  Diabetic ulcer of left heel associated with type 2 diabetes mellitus, with fat layer exposed ([address] 06/21/2019  Open breast wound, left, initial encounter 06/07/2019  Venous stasis ulcer of right lower leg with edema of right lower leg (HCC) 11/14/2018  Diabetic polyneuropathy associated with type 2 diabetes mellitus ([address] 11/13/2018  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, associated with type 2 diabetes mellitus ([address] 04/25/2018  Morbid obesity with BMI of 40.0-44.9, adult ([address] 05/01/2017  Controlled type 2 diabetes mellitus with stage 4 chronic kidney disease, with long-term current use of insulin ([address] 01/16/2017  PAF (paroxysmal atrial fibrillation) ([address] 11/28/2016  Anemia in stage 4 chronic kidney disease ([address] 11/28/2016  Essential hypertension 08/26/2016  Systolic murmur 39/68/2533  CKD (chronic kidney disease), stage IV ([address] 06/09/2012  Mixed hyperlipidemia 05/22/2012  Long term (current) use of anticoagulants 04/11/2012  S/P cardiac pacemaker procedure 04/03/2012  Sick sinus syndrome ([facility] 04/02/2012  Status post ablation of atrial fibrillation 12/15/2011  Fe deficiency anemia 04/14/2010  
 History of breast cancer 04/13/2010  Asthma 04/13/2010 Past Medical History:  
Diagnosis Date  Acquired lymphedema Right arm  Arrhythmia  Asthma  Atrial fibrillation ([facility] Dr. Eusebia Agrawal and Dr. Мария Arroyo [facility])  Cancer ([facility] bilateral breast  
 Chronic kidney disease  Diabetes mellitus type II   
 Dizziness  Essential hypertension  Hyperlipidemia  Hypertension  Long term (current) use of anticoagulants  Morbid obesity ([facility] 3/14/2012  Nausea & vomiting  Osteoarthritis  Pacemaker  Sick sinus syndrome ([facility] Past Surgical History:  
Procedure Laterality Date  ABDOMEN SURGERY PROC UNLISTED    
 gastric bypass  GASTRIC BYPASS,OBESE<150CM SAW-EN-Y  7/08  
 [facility]  HX AFIB ABLATION    
 HX CARPAL TUNNEL RELEASE [address] RIGHT MODIFIED RADICAL   
 HX MOHS PROCEDURES  1995  
 right  HX ORTHOPAEDIC Right   
 knee surgery  HX PACEMAKER    
 IR INSERT TUNL CVC W PORT OVER 5 YEARS    
 LAMINECTOMY,CERVICAL  1994  
 NEEDLE BIOPSY LIVER,W OTHR [address]  8.21.07  
 ND [address] AND 1994  ND TRABECULOPLASTY BY LASER SURGERY    
 US GUIDE ASP OVARIAN CYST ABD APPROACH  8.21.07  
 RIGHT Family History Problem Relation Age of Onset  Cancer Mother  Heart Disease Mother  Alcohol abuse Father  Diabetes Brother  Hypertension Brother Social History Tobacco Use  Smoking status: Never Smoker  Smokeless tobacco: Never Used Substance Use Topics  Alcohol use: Yes Comment: rare Allergies Allergen Reactions  Ace Inhibitors Cough  Amlodipine Swelling  Avapro [Irbesartan] Unable to Obtain  Bactrim [Sulfamethoxazole-Trimethoprim] Other (comments) Sore mouth  Captopril Cough  Carvedilol Diarrhea [address]  Morphine Nausea and Vomiting and Swelling  Penicillins Hives Tolerated cefepime 1/2020  Pravachol [Pravastatin] Nausea Only  Seafood [Shellfish Containing Products] Hives  Singulair [Montelukast] Other (comments)  
  palpitations Prior to Admission medications Medication Sig Start Date End Date Taking? Authorizing Provider  
warfarin (COUMADIN) 4 mg tablet Take 2 mg by mouth five (5) days a week. Take 1 tablet (4 mg) on Sun and Wed and a half tablet (2 mg) the other 5 days. Yes Provider, Historical  
warfarin (COUMADIN) 4 mg tablet Take 4 mg by mouth two (2) times a week. Take 1 tablet (4 mg) on Sun and Wed and a half tablet (2 mg) the other 5 days. Yes Provider, Historical  
acetaminophen 500 mg tab 500 mg, diphenhydrAMINE 25 mg cap 25 mg Take 2 Doses by mouth nightly. Provider, Historical  
potassium chloride SR (KLOR-CON 10) 10 mEq tablet Take 10 mEq by mouth daily. Indications: Patient states she intends to take this medication untl she discusses with Dr. Fredrick Contreras on 12-12-19. Provider, Historical  
insulin glargine (LANTUS U-100 INSULIN) 100 unit/mL injection Inject 20 units daily 11/10/19   Jt Sargent MD  
hydrALAZINE (APRESOLINE) 100 mg tablet TAKE ONE TABLET BY MOUTH THREE TIMES A DAY 10/21/19   Jt Sargent MD  
sertraline (ZOLOFT) 50 mg tablet TAKE ONE AND ONE-HALF (1 & 1/2) TABLET BY MOUTH DAILY 8/22/19   Jt Sargent MD  
bumetanide (BUMEX) 1 mg tablet Take 2 mg by mouth daily. Provider, Historical  
cloNIDine HCl (CATAPRES) 0.3 mg tablet Take 0.6 mg by mouth nightly.     Provider, Historical  
metoprolol succinate (TOPROL-XL) 100 mg tablet TAKE TWO TABLETS BY MOUTH DAILY 19   Rg Patel MD  
doxazosin (CARDURA) 4 mg tablet TAKE ONE TABLET BY MOUTH ONCE NIGHTLY 19   Rg Patel MD  
busPIRone (BUSPAR) 10 mg tablet TAKE ONE TABLET BY MOUTH TWICE A DAY 19   Rg Patel MD  
metolazone (ZAROXOLYN) 5 mg tablet Take 1 Tab by mouth daily as needed (take if develop increased LE edema, weight gain > 5 pounds. ). 4/10/14   Demetrius Garcia NP  
cholecalciferol, VITAMIN D3, (VITAMIN D3) 5,000 unit tab tablet Take 5,000 Units by mouth daily. Provider, Historical  
vitamin A 8,000 unit capsule Take 8,000 Units by mouth daily. Provider, Historical  
insulin lispro (HUMALOG) 100 unit/mL kwikpen 2 units with dinner for sugars over 200 1/3/14   Rg Patel MD  
cyanocobalamin (VITAMIN B-12) 1,000 mcg sublingual tablet Take 1,000 mcg by mouth daily. Provider, Historical  
 
 
Review of Systems AO x3. NAD. Objective:  
 
Patient Vitals for the past 8 hrs: 
 BP Temp Pulse Resp SpO2  
20 1456 129/55 98.3 °F (36.8 °C) 68 18 100 % 20 1333 115/48 99.1 °F (37.3 °C) 70 18 98 % Temp (24hrs), Av.2 °F (36.8 °C), Min:97.1 °F (36.2 °C), Max:99.1 °F (37.3 °C) Physical Exam Lower Extremity Dressings removed. Wounds left lateral foot and plantar foot now fibrotic again. No active pus drainage. DP and PT 2/4; CFT less than 3 seconds Sensation intact to touch. Data Review:  
Recent Results (from the past 24 hour(s)) GLUCOSE, POC Collection Time: 20 10:06 PM  
Result Value Ref Range Glucose (POC) 187 (H) 65 - 100 mg/dL Performed by [facility] RN   
METABOLIC PANEL, BASIC Collection Time: 20  1:35 AM  
Result Value Ref Range Sodium 135 (L) 136 - 145 mmol/L Potassium 3.8 3.5 - 5.1 mmol/L Chloride 105 97 - 108 mmol/L  
 CO2 26 21 - 32 mmol/L Anion gap 4 (L) 5 - 15 mmol/L Glucose 177 (H) 65 - 100 mg/dL BUN 35 (H) 6 - 20 MG/DL  Creatinine 2.51 (H) 0.55 - 1.02 MG/DL  
 BUN/Creatinine ratio 14 12 - 20 GFR est AA 24 (L) >60 ml/min/1.73m2 GFR est non-AA 20 (L) >60 ml/min/1.73m2 Calcium 8.9 8.5 - 10.1 MG/DL PROTHROMBIN TIME + INR Collection Time: 03/19/20  1:35 AM  
Result Value Ref Range INR 3.0 (H) 0.9 - 1.1 Prothrombin time 29.0 (H) 9.0 - 11.1 sec GLUCOSE, POC Collection Time: 03/19/20  7:00 AM  
Result Value Ref Range Glucose (POC) 188 (H) 65 - 100 mg/dL Performed by Truong Givens (PCT) GLUCOSE, POC Collection Time: 03/19/20  7:30 AM  
Result Value Ref Range Glucose (POC) 177 (H) 65 - 100 mg/dL Performed by Elliott Munoz RN   
GLUCOSE, POC Collection Time: 03/19/20 11:27 AM  
Result Value Ref Range Glucose (POC) 253 (H) 65 - 100 mg/dL Performed by Vinnie Powell, POC Collection Time: 03/19/20  4:45 PM  
Result Value Ref Range Glucose (POC) 130 (H) 65 - 100 mg/dL Performed by Charlene Lin Impression:  
Diabetic ulcer with exposed bone left foot Recommendation:  
INR is 3.0, so I will schedule her for Sx on Saturday morning

## 2020-03-20 NOTE — RESEARCH NURSE
Hospitalist Progress Note NAME: Morales Barron :  1960 MRN:  [id number] Assessment / Plan: 
 
Patient is a 78-year-old  female with a past medical history of diabetes was admitted for sepsis secondary to left lower extremity cellulitis and ulcer, currently awaiting to go to the OR. Left leg cellulitis and ulceration of the foot: Continue metronidazole and Levaquin renally dose  Cultures with no growth to date  Wound culture MSSA sensitive Proteus  To go to the OR tomorrow with podiatry, INR now down to 1.4, however patient already ate breakfast 
 
Acute metabolic encephalopathy: ResolvedMetabolic encephalopathy was likely secondary to hypoglycemia, poor p.o. intake, and cefepime. Diabetes:  Continuing NovoLog 5 units with meals, sliding scale lispro A. fib with RVR: 
Continue metoprolol Continue diltiazem  INR down to 1.4 Cardiology following Elevated troponin Supply demand secondary to A. fib, resolved CKD 4: 
 Creatinine remained stable at 2.4 Renally dose medications [facility] following Epogen Hypertension Continue home medications: Hydralazine, Toprol, Cardura, Cardizem  Hold clonidine and metolazone Hyperlipidemia Anxiety  Continue Zoloft, BuSpar Heart failure No exacerbation, Bumex was held as per nephrology Sick sinus syndrome  Pacemaker 30.0 - 39.9 Obese / Body mass index is 35.23 kg/m². Is in yellow Code status: Full Prophylaxis: Coumadin and SCD's Recommended Disposition: SNF/LTC and  PT, OT, RN Subjective: Chief Complaint / Reason for Physician Visit \" Foot pain, ulcer. Patient states she intentionally ate poorly last night given her INR down, in addition to the 2 doses of vitamin K she has already gotten. She was hoping to get surgery today since the INR was down, however she already ate breakfast.\". Discussed with RN events overnight. Review of Systems: Symptom Y/N Comments  Symptom Y/N Comments Fever/Chills n   Chest Pain n   
Poor Appetite n   Edema y Cough n   Abdominal Pain n   
Sputum n   Joint Pain n   
SOB/CHOW n   Pruritis/Rash n   
Nausea/vomit n   Tolerating PT/OT y Diarrhea n   Tolerating Diet y Constipation n   Other Could NOT obtain due to:   
 
Objective: VITALS:  
Last 24hrs VS reviewed since prior progress note. Most recent are: 
Patient Vitals for the past 24 hrs: 
 Temp Pulse Resp BP SpO2  
03/20/20 0805 98.3 °F (36.8 °C) 87 18 146/73 98 % 03/20/20 0340 98.1 °F (36.7 °C) 84 18 141/62 96 % 03/20/20 0035 97.8 °F (36.6 °C) 78 18 128/48 96 % 03/19/20 2122 97.9 °F (36.6 °C) 72 20 126/49 99 % 03/19/20 1456 98.3 °F (36.8 °C) 68 18 129/55 100 % 03/19/20 1333 99.1 °F (37.3 °C) 70 18 115/48 98 % 03/19/20 1118 98 °F (36.7 °C) 68 18 121/65 100 % No intake or output data in the 24 hours ending 03/20/20 0919 PHYSICAL EXAM: 
General: WD, WN. Alert, cooperative, no acute distress   
EENT:  EOMI. Anicteric sclerae. MMM Resp:  CTA bilaterally, no wheezing or rales. No accessory muscle use CV:  Regular  rhythm, 2+ lower extremity edema GI:  Soft, Non distended, Non tender.  +Bowel sounds Neurologic:  Alert and oriented X 3, normal speech, Psych:   Good insight. Not anxious nor agitated Skin:  No rashes. No jaundice Reviewed most current lab test results and cultures  YES Reviewed most current radiology test results   YES Review and summation of old records today    NO Reviewed patient's current orders and MAR    YES 
PMH/SH reviewed - no change compared to H&P 
________________________________________________________________________ Care Plan discussed with: 
  Comments Patient  X Family RN  X   
Care Manager Consultant   X   
                 X Multidiciplinary team rounds were held today with , nursing, pharmacist and clinical coordinator.   Patient's plan of care was discussed; medications were reviewed and discharge planning was addressed. ________________________________________________________________________ Total NON critical care TIME: 30   minutes Total CRITICAL CARE TIME Spent:   Minutes non procedure based Comments >50% of visit spent in counseling and coordination of care    
________________________________________________________________________ Heather Burks MD  
 
Procedures: see electronic medical records for all procedures/Xrays and details which were not copied into this note but were reviewed prior to creation of Plan. LABS: 
I reviewed today's most current labs and imaging studies. Pertinent labs include: 
Recent Labs  
  03/20/20 
0052 03/18/20 
0401 WBC 12.8* 10.5 HGB 9.6* 9.2* HCT 31.7* 30.0*  
* 520* Recent Labs  
  03/20/20 
0052 03/19/20 
0135 03/18/20 
0401 * 135* 136  
K 3.9 3.8 3.4*  
 105 105 CO2 25 26 26 * 177* 148* BUN 35* 35* 36* CREA 2.48* 2.51* 2.38* CA 9.2 8.9 9.0 INR 1.4* 3.0* 4.4* Signed: Heather Burks MD

## 2020-03-20 NOTE — PROGRESS NOTES
Hospitalist Progress Note NAME: Antonietta Neal :  1960 MRN:  [id number] Assessment / Plan: 
 
Patient is a 70-year-old  female with a past medical history of diabetes was admitted for sepsis secondary to left lower extremity cellulitis and ulcer, currently awaiting to go to the OR. Left leg cellulitis and ulceration of the foot: Continue metronidazole and Levaquin renally dose  Cultures with no growth to date  Wound culture MSSA sensitive Proteus  To go to the OR tomorrow with podiatry, INR now down to 1.4, however patient already ate breakfast 
 
Acute metabolic encephalopathy: ResolvedMetabolic encephalopathy was likely secondary to hypoglycemia, poor p.o. intake, and cefepime. Diabetes:  Continuing NovoLog 5 units with meals, sliding scale lispro A. fib with RVR: 
Continue metoprolol Continue diltiazem  INR down to 1.4 Cardiology following Elevated troponin Supply demand secondary to A. fib, resolved CKD 4: 
 Creatinine remained stable at 2.4 Renally dose medications [facility] Nephrology following Epogen Hypertension Continue home medications: Hydralazine, Toprol, Cardura, Cardizem  Hold clonidine and metolazone Hyperlipidemia Anxiety  Continue Zoloft, BuSpar Heart failure No exacerbation, Bumex was held as per nephrology Sick sinus syndrome  Pacemaker 30.0 - 39.9 Obese / Body mass index is 35.23 kg/m². Is in yellow Code status: Full Prophylaxis: Coumadin and SCD's Recommended Disposition: SNF/LTC and  PT, OT, RN Subjective: Chief Complaint / Reason for Physician Visit \" Foot pain, ulcer. Patient states she intentionally ate poorly last night given her INR down, in addition to the 2 doses of vitamin K she has already gotten. She was hoping to get surgery today since the INR was down, however she already ate breakfast.\". Discussed with RN events overnight. Review of Systems: Symptom Y/N Comments  Symptom Y/N Comments Fever/Chills n   Chest Pain n   
Poor Appetite n   Edema y Cough n   Abdominal Pain n   
Sputum n   Joint Pain n   
SOB/CHOW n   Pruritis/Rash n   
Nausea/vomit n   Tolerating PT/OT y Diarrhea n   Tolerating Diet y Constipation n   Other Could NOT obtain due to:   
 
Objective: VITALS:  
Last 24hrs VS reviewed since prior progress note. Most recent are: 
Patient Vitals for the past 24 hrs: 
 Temp Pulse Resp BP SpO2  
03/20/20 0805 98.3 °F (36.8 °C) 87 18 146/73 98 % 03/20/20 0340 98.1 °F (36.7 °C) 84 18 141/62 96 % 03/20/20 0035 97.8 °F (36.6 °C) 78 18 128/48 96 % 03/19/20 2122 97.9 °F (36.6 °C) 72 20 126/49 99 % 03/19/20 1456 98.3 °F (36.8 °C) 68 18 129/55 100 % 03/19/20 1333 99.1 °F (37.3 °C) 70 18 115/48 98 % 03/19/20 1118 98 °F (36.7 °C) 68 18 121/65 100 % No intake or output data in the 24 hours ending 03/20/20 0653 PHYSICAL EXAM: 
General: WD, WN. Alert, cooperative, no acute distress   
EENT:  EOMI. Anicteric sclerae. MMM Resp:  CTA bilaterally, no wheezing or rales. No accessory muscle use CV:  Regular  rhythm, 2+ lower extremity edema GI:  Soft, Non distended, Non tender.  +Bowel sounds Neurologic:  Alert and oriented X 3, normal speech, Psych:   Good insight. Not anxious nor agitated Skin:  No rashes. No jaundice Reviewed most current lab test results and cultures  YES Reviewed most current radiology test results   YES Review and summation of old records today    NO Reviewed patient's current orders and MAR    YES 
PMH/SH reviewed - no change compared to H&P 
________________________________________________________________________ Care Plan discussed with: 
  Comments Patient  X Family RN  X   
Care Manager Consultant   X   
                 X Multidiciplinary team rounds were held today with , nursing, pharmacist and clinical coordinator.   Patient's plan of care was discussed; medications were reviewed and discharge planning was addressed. ________________________________________________________________________ Total NON critical care TIME: 30   minutes Total CRITICAL CARE TIME Spent:   Minutes non procedure based Comments >50% of visit spent in counseling and coordination of care    
________________________________________________________________________ Caitlin Hatch MD  
 
Procedures: see electronic medical records for all procedures/Xrays and details which were not copied into this note but were reviewed prior to creation of Plan. LABS: 
I reviewed today's most current labs and imaging studies. Pertinent labs include: 
Recent Labs  
  03/20/20 
0052 03/18/20 
0401 WBC 12.8* 10.5 HGB 9.6* 9.2* HCT 31.7* 30.0*  
* 520* Recent Labs  
  03/20/20 
0052 03/19/20 
0135 03/18/20 
0401 * 135* 136  
K 3.9 3.8 3.4*  
 105 105 CO2 25 26 26 * 177* 148* BUN 35* 35* 36* CREA 2.48* 2.51* 2.38* CA 9.2 8.9 9.0 INR 1.4* 3.0* 4.4* Signed: Caitlin Hatch MD

## 2020-03-21 ENCOUNTER — ANESTHESIA (OUTPATIENT)
Dept: SURGERY | Age: 60
DRG: 623 | End: 2020-03-21
Payer: COMMERCIAL

## 2020-03-21 LAB
ANION GAP SERPL CALC-SCNC: 8 MMOL/L (ref 5–15)
BUN SERPL-MCNC: 32 MG/DL (ref 6–20)
BUN/CREAT SERPL: 13 (ref 12–20)
CALCIUM SERPL-MCNC: 8.7 MG/DL (ref 8.5–10.1)
CHLORIDE SERPL-SCNC: 105 MMOL/L (ref 97–108)
CO2 SERPL-SCNC: 23 MMOL/L (ref 21–32)
CREAT SERPL-MCNC: 2.38 MG/DL (ref 0.55–1.02)
GLUCOSE BLD STRIP.AUTO-MCNC: 110 MG/DL (ref 65–100)
GLUCOSE BLD STRIP.AUTO-MCNC: 116 MG/DL (ref 65–100)
GLUCOSE BLD STRIP.AUTO-MCNC: 131 MG/DL (ref 65–100)
GLUCOSE BLD STRIP.AUTO-MCNC: 134 MG/DL (ref 65–100)
GLUCOSE BLD STRIP.AUTO-MCNC: 215 MG/DL (ref 65–100)
GLUCOSE SERPL-MCNC: 134 MG/DL (ref 65–100)
INR PPP: 1.3 (ref 0.9–1.1)
POTASSIUM SERPL-SCNC: 3.9 MMOL/L (ref 3.5–5.1)
PROTHROMBIN TIME: 13.1 SEC (ref 9–11.1)
SERVICE CMNT-IMP: ABNORMAL
SODIUM SERPL-SCNC: 136 MMOL/L (ref 136–145)

## 2020-03-21 PROCEDURE — 76210000006 HC OR PH I REC 0.5 TO 1 HR: Performed by: PODIATRIST

## 2020-03-21 PROCEDURE — 74011250637 HC RX REV CODE- 250/637: Performed by: PODIATRIST

## 2020-03-21 PROCEDURE — 77030008462 HC STPLR SKN PROX J&J -A: Performed by: PODIATRIST

## 2020-03-21 PROCEDURE — 85610 PROTHROMBIN TIME: CPT

## 2020-03-21 PROCEDURE — 74011250637 HC RX REV CODE- 250/637: Performed by: INTERNAL MEDICINE

## 2020-03-21 PROCEDURE — 74011636637 HC RX REV CODE- 636/637: Performed by: PODIATRIST

## 2020-03-21 PROCEDURE — 74011000250 HC RX REV CODE- 250: Performed by: PODIATRIST

## 2020-03-21 PROCEDURE — 74011250636 HC RX REV CODE- 250/636: Performed by: INTERNAL MEDICINE

## 2020-03-21 PROCEDURE — 77030039464 HC TU IRR ENDO DISP MSNX -B: Performed by: PODIATRIST

## 2020-03-21 PROCEDURE — 74011250636 HC RX REV CODE- 250/636: Performed by: NURSE ANESTHETIST, CERTIFIED REGISTERED

## 2020-03-21 PROCEDURE — 77030011640 HC PAD GRND REM COVD -A: Performed by: PODIATRIST

## 2020-03-21 PROCEDURE — 77030039465 HC PRB ASPIR SONICVAC MSNX -F: Performed by: PODIATRIST

## 2020-03-21 PROCEDURE — 77030018836 HC SOL IRR NACL ICUM -A: Performed by: PODIATRIST

## 2020-03-21 PROCEDURE — 65660000000 HC RM CCU STEPDOWN

## 2020-03-21 PROCEDURE — 0JBR0ZZ EXCISION OF LEFT FOOT SUBCUTANEOUS TISSUE AND FASCIA, OPEN APPROACH: ICD-10-PCS | Performed by: PODIATRIST

## 2020-03-21 PROCEDURE — 82962 GLUCOSE BLOOD TEST: CPT

## 2020-03-21 PROCEDURE — 80048 BASIC METABOLIC PNL TOTAL CA: CPT

## 2020-03-21 PROCEDURE — 0JRR0KZ REPLACEMENT OF LEFT FOOT SUBCUTANEOUS TISSUE AND FASCIA WITH NONAUTOLOGOUS TISSUE SUBSTITUTE, OPEN APPROACH: ICD-10-PCS | Performed by: PODIATRIST

## 2020-03-21 PROCEDURE — 76010000149 HC OR TIME 1 TO 1.5 HR: Performed by: PODIATRIST

## 2020-03-21 PROCEDURE — 76060000033 HC ANESTHESIA 1 TO 1.5 HR: Performed by: PODIATRIST

## 2020-03-21 PROCEDURE — 36415 COLL VENOUS BLD VENIPUNCTURE: CPT

## 2020-03-21 PROCEDURE — 74011000250 HC RX REV CODE- 250: Performed by: NURSE ANESTHETIST, CERTIFIED REGISTERED

## 2020-03-21 RX ORDER — DIPHENHYDRAMINE HYDROCHLORIDE 50 MG/ML
12.5 INJECTION, SOLUTION INTRAMUSCULAR; INTRAVENOUS AS NEEDED
Status: DISCONTINUED | OUTPATIENT
Start: 2020-03-21 | End: 2020-03-21 | Stop reason: HOSPADM

## 2020-03-21 RX ORDER — SODIUM CHLORIDE 0.9 % (FLUSH) 0.9 %
5-40 SYRINGE (ML) INJECTION EVERY 8 HOURS
Status: DISCONTINUED | OUTPATIENT
Start: 2020-03-21 | End: 2020-03-21 | Stop reason: HOSPADM

## 2020-03-21 RX ORDER — PROPOFOL 10 MG/ML
INJECTION, EMULSION INTRAVENOUS AS NEEDED
Status: DISCONTINUED | OUTPATIENT
Start: 2020-03-21 | End: 2020-03-21 | Stop reason: HOSPADM

## 2020-03-21 RX ORDER — SODIUM CHLORIDE 0.9 % (FLUSH) 0.9 %
5-40 SYRINGE (ML) INJECTION EVERY 8 HOURS
Status: CANCELLED | OUTPATIENT
Start: 2020-03-21

## 2020-03-21 RX ORDER — ONDANSETRON 2 MG/ML
INJECTION INTRAMUSCULAR; INTRAVENOUS AS NEEDED
Status: DISCONTINUED | OUTPATIENT
Start: 2020-03-21 | End: 2020-03-21 | Stop reason: HOSPADM

## 2020-03-21 RX ORDER — PROPOFOL 10 MG/ML
INJECTION, EMULSION INTRAVENOUS
Status: DISCONTINUED | OUTPATIENT
Start: 2020-03-21 | End: 2020-03-21 | Stop reason: HOSPADM

## 2020-03-21 RX ORDER — SODIUM CHLORIDE 0.9 % (FLUSH) 0.9 %
5-40 SYRINGE (ML) INJECTION AS NEEDED
Status: CANCELLED | OUTPATIENT
Start: 2020-03-21

## 2020-03-21 RX ORDER — HYDROMORPHONE HYDROCHLORIDE 1 MG/ML
0.2 INJECTION, SOLUTION INTRAMUSCULAR; INTRAVENOUS; SUBCUTANEOUS
Status: DISCONTINUED | OUTPATIENT
Start: 2020-03-21 | End: 2020-03-21 | Stop reason: HOSPADM

## 2020-03-21 RX ORDER — FENTANYL CITRATE 50 UG/ML
25 INJECTION, SOLUTION INTRAMUSCULAR; INTRAVENOUS
Status: DISCONTINUED | OUTPATIENT
Start: 2020-03-21 | End: 2020-03-21 | Stop reason: HOSPADM

## 2020-03-21 RX ORDER — EPHEDRINE SULFATE/0.9% NACL/PF 50 MG/5 ML
SYRINGE (ML) INTRAVENOUS AS NEEDED
Status: DISCONTINUED | OUTPATIENT
Start: 2020-03-21 | End: 2020-03-21 | Stop reason: HOSPADM

## 2020-03-21 RX ORDER — FENTANYL CITRATE 50 UG/ML
INJECTION, SOLUTION INTRAMUSCULAR; INTRAVENOUS AS NEEDED
Status: DISCONTINUED | OUTPATIENT
Start: 2020-03-21 | End: 2020-03-21 | Stop reason: HOSPADM

## 2020-03-21 RX ORDER — SODIUM CHLORIDE 0.9 % (FLUSH) 0.9 %
5-40 SYRINGE (ML) INJECTION AS NEEDED
Status: DISCONTINUED | OUTPATIENT
Start: 2020-03-21 | End: 2020-03-21 | Stop reason: HOSPADM

## 2020-03-21 RX ORDER — VANCOMYCIN 2 GRAM/500 ML IN 0.9 % SODIUM CHLORIDE INTRAVENOUS
2000 ONCE
Status: COMPLETED | OUTPATIENT
Start: 2020-03-21 | End: 2020-03-21

## 2020-03-21 RX ORDER — BUPIVACAINE HYDROCHLORIDE 5 MG/ML
INJECTION, SOLUTION EPIDURAL; INTRACAUDAL AS NEEDED
Status: DISCONTINUED | OUTPATIENT
Start: 2020-03-21 | End: 2020-03-21 | Stop reason: HOSPADM

## 2020-03-21 RX ORDER — SODIUM CHLORIDE 9 MG/ML
INJECTION, SOLUTION INTRAVENOUS
Status: DISCONTINUED | OUTPATIENT
Start: 2020-03-21 | End: 2020-03-21 | Stop reason: HOSPADM

## 2020-03-21 RX ORDER — MIDAZOLAM HYDROCHLORIDE 1 MG/ML
INJECTION, SOLUTION INTRAMUSCULAR; INTRAVENOUS AS NEEDED
Status: DISCONTINUED | OUTPATIENT
Start: 2020-03-21 | End: 2020-03-21 | Stop reason: HOSPADM

## 2020-03-21 RX ORDER — LIDOCAINE HYDROCHLORIDE 10 MG/ML
0.1 INJECTION, SOLUTION EPIDURAL; INFILTRATION; INTRACAUDAL; PERINEURAL AS NEEDED
Status: CANCELLED | OUTPATIENT
Start: 2020-03-21

## 2020-03-21 RX ORDER — LEVOFLOXACIN 5 MG/ML
750 INJECTION, SOLUTION INTRAVENOUS
Status: DISCONTINUED | OUTPATIENT
Start: 2020-03-23 | End: 2020-03-22 | Stop reason: HOSPADM

## 2020-03-21 RX ORDER — WARFARIN 2 MG/1
4 TABLET ORAL ONCE
Status: COMPLETED | OUTPATIENT
Start: 2020-03-21 | End: 2020-03-21

## 2020-03-21 RX ORDER — SODIUM CHLORIDE, SODIUM LACTATE, POTASSIUM CHLORIDE, CALCIUM CHLORIDE 600; 310; 30; 20 MG/100ML; MG/100ML; MG/100ML; MG/100ML
25 INJECTION, SOLUTION INTRAVENOUS CONTINUOUS
Status: DISCONTINUED | OUTPATIENT
Start: 2020-03-21 | End: 2020-03-21 | Stop reason: HOSPADM

## 2020-03-21 RX ADMIN — WARFARIN SODIUM 4 MG: 2 TABLET ORAL at 19:14

## 2020-03-21 RX ADMIN — DILTIAZEM HYDROCHLORIDE 60 MG: 60 TABLET, FILM COATED ORAL at 17:02

## 2020-03-21 RX ADMIN — Medication 1 AMPULE: at 23:18

## 2020-03-21 RX ADMIN — SODIUM CHLORIDE: 900 INJECTION, SOLUTION INTRAVENOUS at 14:30

## 2020-03-21 RX ADMIN — Medication 10 MG: at 15:13

## 2020-03-21 RX ADMIN — PROPOFOL 30 MG: 10 INJECTION, EMULSION INTRAVENOUS at 14:45

## 2020-03-21 RX ADMIN — FENTANYL CITRATE 25 MCG: 50 INJECTION, SOLUTION INTRAMUSCULAR; INTRAVENOUS at 14:45

## 2020-03-21 RX ADMIN — FENTANYL CITRATE 25 MCG: 50 INJECTION, SOLUTION INTRAMUSCULAR; INTRAVENOUS at 14:49

## 2020-03-21 RX ADMIN — FENTANYL CITRATE 25 MCG: 50 INJECTION, SOLUTION INTRAMUSCULAR; INTRAVENOUS at 15:33

## 2020-03-21 RX ADMIN — VANCOMYCIN HYDROCHLORIDE 2000 MG: 10 INJECTION, POWDER, LYOPHILIZED, FOR SOLUTION INTRAVENOUS at 19:21

## 2020-03-21 RX ADMIN — DILTIAZEM HYDROCHLORIDE 60 MG: 60 TABLET, FILM COATED ORAL at 08:00

## 2020-03-21 RX ADMIN — INSULIN LISPRO 2 UNITS: 100 INJECTION, SOLUTION INTRAVENOUS; SUBCUTANEOUS at 23:16

## 2020-03-21 RX ADMIN — Medication 10 ML: at 05:20

## 2020-03-21 RX ADMIN — HYDRALAZINE HYDROCHLORIDE 50 MG: 50 TABLET, FILM COATED ORAL at 17:02

## 2020-03-21 RX ADMIN — BUSPIRONE HYDROCHLORIDE 10 MG: 10 TABLET ORAL at 17:02

## 2020-03-21 RX ADMIN — MIDAZOLAM HYDROCHLORIDE 2 MG: 1 INJECTION, SOLUTION INTRAMUSCULAR; INTRAVENOUS at 14:39

## 2020-03-21 RX ADMIN — METOPROLOL SUCCINATE 100 MG: 50 TABLET, EXTENDED RELEASE ORAL at 08:59

## 2020-03-21 RX ADMIN — CYANOCOBALAMIN TAB 500 MCG 1000 MCG: 500 TAB at 17:02

## 2020-03-21 RX ADMIN — PROPOFOL 50 MCG/KG/MIN: 10 INJECTION, EMULSION INTRAVENOUS at 14:44

## 2020-03-21 RX ADMIN — MELATONIN 5 TABLET: at 17:02

## 2020-03-21 RX ADMIN — DILTIAZEM HYDROCHLORIDE 60 MG: 60 TABLET, FILM COATED ORAL at 12:49

## 2020-03-21 RX ADMIN — DOXAZOSIN MESYLATE 4 MG: 2 TABLET ORAL at 23:16

## 2020-03-21 RX ADMIN — LEVOFLOXACIN 750 MG: 5 INJECTION, SOLUTION INTRAVENOUS at 12:49

## 2020-03-21 RX ADMIN — INSULIN GLARGINE 10 UNITS: 100 INJECTION, SOLUTION SUBCUTANEOUS at 23:16

## 2020-03-21 RX ADMIN — SERTRALINE HYDROCHLORIDE 50 MG: 50 TABLET ORAL at 17:02

## 2020-03-21 RX ADMIN — HYDRALAZINE HYDROCHLORIDE 50 MG: 50 TABLET, FILM COATED ORAL at 05:20

## 2020-03-21 RX ADMIN — ONDANSETRON HYDROCHLORIDE 4 MG: 2 INJECTION, SOLUTION INTRAMUSCULAR; INTRAVENOUS at 15:35

## 2020-03-21 RX ADMIN — Medication 1 AMPULE: at 08:59

## 2020-03-21 RX ADMIN — FENTANYL CITRATE 25 MCG: 50 INJECTION, SOLUTION INTRAMUSCULAR; INTRAVENOUS at 15:01

## 2020-03-21 RX ADMIN — PROPOFOL 30 MG: 10 INJECTION, EMULSION INTRAVENOUS at 14:49

## 2020-03-21 NOTE — PROGRESS NOTES
Podiatry Leave bandage in place left foot. NWB left for 3 days, then WB for essential activities. If patient is discharged before 3/25/20, she will need to F/U in my office on 3/25/20.

## 2020-03-21 NOTE — BRIEF OP NOTE
BRIEF OPERATIVE NOTE Date of Procedure: 3/21/2020 Preoperative Diagnosis: Diabetic ulcer left foot with exposed bone Postoperative Diagnosis:  Diabetic ulcer left foot with exposed bone Procedure(s): DEBRIDEMENT OF WOUND LEFT FOOT, PLACEMENT OF SKIN SUBSTITUTE GRAFT LEFT FOOT (IV SEDATION W/ LOCAL BY SURGEON)  46 cm square Surgeon(s) and Role: * Santiago Cordon DPM - Primary Surgical Assistant: n/a Surgical Staff: 
Circ-1: Moris Montoya RN Scrub Tech-1: Jada Carcamo Float Staff: Beronica Shoemaker RN; Osiris Pimentel Event Time In Incision Start 03/21/2020 1505 Incision Close Anesthesia: MAC Estimated Blood Loss: Less than 5 ml Specimens: * No specimens in log * Findings: n/a Complications: none Implants: * No implants in log *

## 2020-03-21 NOTE — ANESTHESIA POSTPROCEDURE EVALUATION
Procedure(s): DEBRIDEMENT OF WOUND LEFT FOOT, PLACEMENT OF SKIN SUBSTITUTE GRAFT LEFT FOOT (IV SEDATION W/ LOCAL BY SURGEON) (ESSENTIAL). MAC, total IV anesthesia Anesthesia Post Evaluation Patient location during evaluation: PACU Note status: Adequate. Level of consciousness: responsive to verbal stimuli and sleepy but conscious Pain management: satisfactory to patient Airway patency: patent Anesthetic complications: no 
Cardiovascular status: acceptable Respiratory status: acceptable Hydration status: acceptable Comments: +Post-Anesthesia Evaluation and Assessment Patient: Daysi Gardner MRN: [id number]  SSN: [id number] YOB: 1960  Age: 60 y.o. Sex: female Cardiovascular Function/Vital Signs BP 99/54   Pulse 70   Temp 36.4 °C (97.6 °F)   Resp 16   Ht 5' 9\" (1.753 m)   Wt 108.2 kg (238 lb 8.6 oz)   SpO2 98%   BMI 35.23 kg/m² Patient is status post Procedure(s): DEBRIDEMENT OF WOUND LEFT FOOT, PLACEMENT OF SKIN SUBSTITUTE GRAFT LEFT FOOT (IV SEDATION W/ LOCAL BY SURGEON) (ESSENTIAL). Nausea/Vomiting: Controlled. Postoperative hydration reviewed and adequate. Pain: 
Pain Scale 1: Numeric (0 - 10) (03/21/20 1315) Pain Intensity 1: 0 (03/21/20 1315) Managed. Neurological Status:  
Neuro (WDL): Exceptions to WDL (03/21/20 1315) At baseline. Mental Status and Level of Consciousness: Arousable. Pulmonary Status:  
O2 Device: Room air (03/21/20 1559) Adequate oxygenation and airway patent. Complications related to anesthesia: None Post-anesthesia assessment completed. No concerns. Signed By: Fara Briggs MD  
 3/21/2020 Post anesthesia nausea and vomiting:  controlled Vitals Value Taken Time BP 99/54 3/21/2020  3:59 PM  
Temp 36.4 °C (97.6 °F) 3/21/2020  3:59 PM  
Pulse 70 3/21/2020  3:59 PM  
Resp 16 3/21/2020  3:59 PM  
SpO2 98 % 3/21/2020  3:59 PM

## 2020-03-21 NOTE — ANESTHESIA PREPROCEDURE EVALUATION
Relevant Problems No relevant active problems Anesthetic History PONV Review of Systems / Medical History Patient summary reviewed, nursing notes reviewed and pertinent labs reviewed Pulmonary Asthma Neuro/Psych Within defined limits Cardiovascular Hypertension CHF Dysrhythmias : atrial fibrillation Pacemaker (PM-sss) and hyperlipidemia Exercise tolerance: >4 METS Comments: Sick Sinus Syndrome 
 
TTE (3/9/20): ·Normal cavity size and systolic function (ejection fraction normal). Moderate concentric hypertrophy. Estimated left ventricular ejection fraction is 55 - 60%. ·Dilated left atrium. ·Dilated right ventricle. Mildly reduced systolic function. ·Mitral valve thickening. Moderate mitral valve regurgitation is present. ·Severely elevated central venous pressure (15+ mmHg); IVC diameter is larger than 21 mm and collapses less than 50% with respiration. GI/Hepatic/Renal 
  
 
 
Renal disease: CRI Comments: S/P Gastric bypass CRI, Stage IV Endo/Other Diabetes: well controlled, type 2, using insulin Obesity, arthritis and cancer (breast) Pertinent negatives: No morbid obesity Other Findings Comments: Non-pressure chronic ulcer of left foot Left foot osteomyelitis Right arm lymphedema Neck and back surgery Physical Exam 
 
Airway Mallampati: II 
TM Distance: 4 - 6 cm Neck ROM: normal range of motion Mouth opening: Normal 
 
 Cardiovascular Regular rate and rhythm,  S1 and S2 normal,  no murmur, click, rub, or gallop Dental 
 
Dentition: Poor dentition, Upper partial plate and Lower partial plate Pulmonary Breath sounds clear to auscultation Abdominal 
GI exam deferred Other Findings Anesthetic Plan ASA: 3 Anesthesia type: MAC and total IV anesthesia Induction: Intravenous Anesthetic plan and risks discussed with: Patient

## 2020-03-21 NOTE — PROGRESS NOTES
Pharmacy Dosing of Warfarin Indication: A. fib Goal INR: 2-3 PTA Warfarin Dose: 4 mg on Sun, Wed and 2 mg on all other days Concurrent anticoagulants: none Concurrent antiplatelet: none Major Interacting Medications Drugs that may increase INR: recent flagyl; levaquin Drugs that may decrease INR: Phytonadione 2.5 mg on 3/19 Conditions that may increase/decrease INR (CHF, C. diff, cirrhosis, thyroid disorder, hypoalbuminemia): None Labs: 
Recent Labs  
  03/21/20 
0506 03/20/20 
0052 03/19/20 
0135 INR 1.3* 1.4* 3.0* HGB  --  9.6*  --   
PLT  --  459*  --   
 
Estimated Creatinine Clearance: 32.9 mL/min (A) (based on SCr of 2.38 mg/dL (H)). Impression/Plan:   
Will order 4 mg x 1 dose. INR daily, CBC w/o diff every other day

## 2020-03-21 NOTE — PROGRESS NOTES
Pharmacy Automatic Renal Dosing Protocol - Antimicrobials Indication for Antimicrobials: SSTI Current Regimen of Each Antimicrobial: 
Vancomycin 2 g IV load followed by 1000 mg IV every 24 hours  (Start Date 3/21; Day # 1) Levofloxacin 750 mg every 48hr (start 3/15, day ) Previous Antimicrobial Therapy: 
Levofloxacin 750mg every 48hr (start 3/15, day 6) Metronidazole 500mg every 12h (start: 3/15, day ) Vancomycin 1000 mg IV every 36 hours (Start Date 3/8; day ) Cefepime 2 g IV every 12 hours (Start Date 3/8; day ) Goal Level: VANCOMYCIN TROUGH GOAL RANGE Vancomycin Trough: 15 - 20 mcg/mL  (AUC: 400 - 600 mg/hr/Liter/day) Date Dose & Interval Measured (mcg/mL) Extrapolated (mcg/mL) Date & time of next level: 3/23 @ 1800 Significant Cultures:  
3/8 blood: NGTD x 5 days- final 
3/10 Wound: heavy group B strep, moderate group G strep, light MSSA, and scant proteus- final 
3/10 Anaerobic: HEAVY ANAEROBIC GRAM NEGATIVE RODS BETA LACTAMASE POSITIVE - final 
 
Radiology / Imaging results: (X-ray, CT scan or MRI):  
3/8 L foot XR: No signal change. Plantar soft tissue ulcers. No apparent osteomyelitis. 3/8 LLE CT (prelim) Unenhanced CT Left Foot show no bone erosion, periostitis or other radiographic features of overt osteomyelitis. There is plantar soft tissue emphysema. Paralysis, amputations, malnutrition: None documented Labs: 
Recent Labs  
  20 
0506 20 
0052 20 
0135 CREA 2.38* 2.48* 2.51* BUN 32* 35* 35* WBC  --  12.8*  -- Temp (24hrs), Av.9 °F (36.6 °C), Min:97.5 °F (36.4 °C), Max:98.7 °F (37.1 °C) Creatinine Clearance (mL/min) or Dialysis: 26 mL/min Impression/Plan:  
Will order vancomycin 2000 mg IV load followed by 1000 mg IV every 24 hours Antimicrobial stop date 7 days Pharmacy will follow daily and adjust medications as appropriate for renal function and/or serum levels. Thank you, Tamar Peña, PHARMD 
 
Recommended duration of therapy 
http://[facility]/[facility]/Pharmacy/Clinical%20Companion/Duration%20of%20ABX%20therapy. docx Renal Dosing 
http://[facility]/[facility]/Pharmacy/Clinical%20Companion/Renal%20Dosing%[id number]. pdf

## 2020-03-21 NOTE — PERIOP NOTES
PT REMAINS A&O X4. FRANKS TO COMMAND. RESP EVEN AND UNLABORED.  BSB. POX ON RA > 94%. LFA IV SITE BENIGN. L FOOT DSG CD&I.  CAP WNL. TOES PINK IN COLOR. PT DENIES DISCOMFORT 0/10 ON PAIN SCALE. REPORT CALLED TO ANA M FAIRCHILD.  1640 - PT TRANSFERRED TO 2114 VIA BED.

## 2020-03-21 NOTE — PERIOP NOTES
Handoff Report from Operating Room to PACU Report received from [facility] and [address] [place] [address] CRNA regarding Asaf Eid. Surgeon(s): 
Ruben Cordon DPM  And Procedure(s) (LRB): DEBRIDEMENT OF WOUND LEFT FOOT, PLACEMENT OF SKIN SUBSTITUTE GRAFT LEFT FOOT (IV SEDATION W/ LOCAL BY SURGEON) (ESSENTIAL) (Left)  confirmed  
with allergies, dressings and PROCEDURE discussed. Anesthesia type, drugs, patient history, complications, estimated blood loss, vital signs, intake and output, and lines and temperature were reviewed. PT ADM TO PACU BAY 9 - EASILY AROUSABLE -OX4. FRANKS TO COMMAND. RESP EVEN AND UNLABORED. POX ON RA > 94%. LFA IV SITE BENIGN. L FOOT COBAN DSG CD&I.  CAP REFILL WNL. PT DENIES DISCOMFORT 0/10 ON PAIN SCALE. SR UP X2.

## 2020-03-21 NOTE — PERIOP NOTES
TRANSFER - IN REPORT: 
 
Verbal report received from [address] [place] [address] Avenue, RN (name) on Mor[facility] Journey  being received from Gen/Surg (unit) for ordered procedure Report consisted of patients Situation, Background, Assessment and  
Recommendations(SBAR). Information from the following report(s) MAR, SBAR, Med rec was reviewed with the receiving nurse. Opportunity for questions and clarification was provided. Assessment completed upon patients arrival to unit and care assumed.

## 2020-03-21 NOTE — PROGRESS NOTES
Hospitalist Progress Note NAME: Peggy Duke :  1960 MRN:  [id number] Assessment / Plan: 
 
Left leg cellulitis POA Left diabetic foot infection POA 
- Continue with Levaquin (renally dosed) - Blood cultures with no growth to date - CT leg noted - Venous US LLE negative for DVT Wound Cx= growing MSSA & sensitive Proteus, grp B strep Prefer to add vanco for better GPC coverage Allergic to PCNs with hives Debridement left foot wound and bone biopsy  3/10 Bone pathology with no osteomyelitis Graft surgery today Wound care per Dr Cary Wu Will discuss optimal duration antibiotics once I have reviewed the chart Continue for now 
  
DM type 2 with renal manifestations POA Hypoglycemia 
lantus 10 units plus SSI 
, 131, 110(NPO for OR) Reassess in AM 
Cont SSI lispro as needed 
  
Atrial fibrillation/RVR 3/9/2019 POA 
- s/p Cardizem bolus and Cardizem gtt with good rate control - No ral diltiazem 
- Continue metoprolol 
- Continue coumadin (hold today dose); pharmacy consult for dosing 
- supra therapeutic resolved initially  INR 1.3 today - Cardiology consult reviewed and appreciated  
- resume coumadin Elevated troponin - Likely due to demand with rapid atrial fibrillation - Downtrending Recurrent candida vaginitis - s/p Fluconazole 150 mg PO x1 Acute Encephalopathy 3/14 due to hypoglycemia resolved Seen in pre -op holding earlier, alert and appropriate Ammonia = 25  
CT head = Moderate small vessel ischemic changes. Chronic sinus disease. No acute intracranial abnormality Examination non focal 
  
CKD stage 4- Cr stable at ~ 2.4 
- Appears to be stable at baseline creatinine - Renally dose medications  
- hold sodium bicarbonate as per nephro 
- hold BUMEX 1 mg held as per nephrology - s/p IV iron - Epogen  
-BMP tomorrow -Nephrology on board.  Greatly appreciate input  
  
HTN 
 - Continue toprol XL, cardizem, hydralazine, cardura 
  
Anxiety  
- Continue Zoloft, Buspar 
  
CHF 
- No exacerbation 
- Off  Bumex  
  
SSS 
- Pacemaker Morbid obesity due to BMI >35 with DM, HTN / Body mass index is 35.23 kg/m². Code status: Full Prophylaxis: anticoagulated with warfarin Recommended Disposition: Home w/Family versus HH/SNF TBD post operatively on monday Subjective: Chief Complaint / Reason for Physician Visit: follow up rapid atrial fibrillation and cellulitis, foot ulcer 
\"just hope my sugar does not drop anymore\" Seen in pre-op holding, going for grafting of left foot Otherwise no complaints Review of Systems: 
Symptom Y/N Comments  Symptom Y/N Comments Fever/Chills n   Chest Pain n   
Poor Appetite    Edema y Cough n   Abdominal Pain n   
Sputum    Joint Pain SOB/CHOW n   Pruritis/Rash Nausea/vomit n   Tolerating PT/OT Diarrhea n   Tolerating Diet y Constipation    Other Could NOT obtain due to:   
 
Objective: VITALS:  
Last 24hrs VS reviewed since prior progress note. Most recent are: 
Patient Vitals for the past 24 hrs: 
 Temp Pulse Resp BP SpO2  
03/21/20 1635  70 14 131/56 98 % 03/21/20 1630 97.5 °F (36.4 °C) 67 14 132/60 100 % 03/21/20 1625  67 22 131/61 100 % 03/21/20 1620  68 18 121/58 100 % 03/21/20 1615  76 18 124/57 100 % 03/21/20 1610  68 11 121/56 100 % 03/21/20 1605  66 10 115/53 100 % 03/21/20 1600  72 14 107/47 97 % 03/21/20 1559 97.6 °F (36.4 °C) 70 16 99/54 98 % 03/21/20 1557 97.6 °F (36.4 °C) 68 15 99/54 99 % 03/21/20 1315 98.7 °F (37.1 °C) 76 18 133/65 99 % 03/21/20 1247  80  132/64   
03/21/20 1140 98 °F (36.7 °C) 89 18 136/74 98 % 03/21/20 0856  99  142/74   
03/21/20 0809 98 °F (36.7 °C) 91 18 130/63 97 % 03/21/20 0319 97.6 °F (36.4 °C) 81 18 123/52 100 % 03/20/20 2336 97.9 °F (36.6 °C) 77 18 117/60 95 % 03/20/20 1852 98 °F (36.7 °C) 78 18 122/57 98 % Intake/Output Summary (Last 24 hours) at 3/21/2020 1700 Last data filed at 3/21/2020 9501 Gross per 24 hour Intake 500 ml Output 5 ml Net 495 ml PHYSICAL EXAM: 
General: WD, WN. Alert, cooperative, no acute distress   
EENT:  EOMI. Anicteric sclerae. MMM Resp:  CTA bilaterally, no wheezing or rales. No accessory muscle use CV:  Irregularly irregular  rhythm, +LLE edema 1-2+ GI:  Soft, Non distended, Non tender.  +Bowel sounds Neurologic:  Alert and oriented X3 today (back to baseline MS today) Psych:   Good insight. Not anxious nor agitated Skin:  No jaundice. Erythema of distal left lower extremity and foot, with heat and tenderness. Reviewed most current lab test results and cultures  YES Reviewed most current radiology test results   YES Review and summation of old records today    NO Reviewed patient's current orders and MAR    YES 
PMH/ reviewed - no change compared to H&P 
________________________________________________________________________ Care Plan discussed with: 
  Comments Patient x Family RN x Care Manager Consultant Multidiciplinary team rounds were held today with , nursing, pharmacist and clinical coordinator. Patient's plan of care was discussed; medications were reviewed and discharge planning was addressed. ________________________________________________________________________ Comments >50% of visit spent in counseling and coordination of care    
________________________________________________________________________ Susana Gatica MD  
 
Procedures: see electronic medical records for all procedures/Xrays and details which were not copied into this note but were reviewed prior to creation of Plan. LABS: 
I reviewed today's most current labs and imaging studies. Pertinent labs include: 
Recent Labs  
  03/20/20 
0052 WBC 12.8* HGB 9.6* HCT 31.7* * Recent Labs 03/21/20 
6340 03/20/20 
5401 03/19/20 
9885  133* 135* K 3.9 3.9 3.8  102 105 CO2 23 25 26 * 167* 177* BUN 32* 35* 35* CREA 2.38* 2.48* 2.51* CA 8.7 9.2 8.9 INR 1.3* 1.4* 3.0* Signed: Alexei Willoughby MD

## 2020-03-21 NOTE — PROGRESS NOTES
Bedside and Verbal shift change report given to brad (oncoming nurse) by Elvira Patel (offgoing nurse). Report included the following information SBAR, Kardex, MAR and Recent Results.

## 2020-03-22 VITALS
WEIGHT: 238.54 LBS | HEIGHT: 69 IN | BODY MASS INDEX: 35.33 KG/M2 | SYSTOLIC BLOOD PRESSURE: 125 MMHG | OXYGEN SATURATION: 99 % | HEART RATE: 82 BPM | RESPIRATION RATE: 16 BRPM | TEMPERATURE: 97.7 F | DIASTOLIC BLOOD PRESSURE: 55 MMHG

## 2020-03-22 LAB
ANION GAP SERPL CALC-SCNC: 7 MMOL/L (ref 5–15)
BASOPHILS # BLD: 0.1 K/UL (ref 0–0.1)
BASOPHILS NFR BLD: 1 % (ref 0–1)
BUN SERPL-MCNC: 27 MG/DL (ref 6–20)
BUN/CREAT SERPL: 13 (ref 12–20)
CALCIUM SERPL-MCNC: 8.6 MG/DL (ref 8.5–10.1)
CHLORIDE SERPL-SCNC: 109 MMOL/L (ref 97–108)
CO2 SERPL-SCNC: 22 MMOL/L (ref 21–32)
CREAT SERPL-MCNC: 2.12 MG/DL (ref 0.55–1.02)
DIFFERENTIAL METHOD BLD: ABNORMAL
EOSINOPHIL # BLD: 0.2 K/UL (ref 0–0.4)
EOSINOPHIL NFR BLD: 2 % (ref 0–7)
ERYTHROCYTE [DISTWIDTH] IN BLOOD BY AUTOMATED COUNT: 16.2 % (ref 11.5–14.5)
GLUCOSE BLD STRIP.AUTO-MCNC: 108 MG/DL (ref 65–100)
GLUCOSE BLD STRIP.AUTO-MCNC: 154 MG/DL (ref 65–100)
GLUCOSE SERPL-MCNC: 116 MG/DL (ref 65–100)
HCT VFR BLD AUTO: 28.4 % (ref 35–47)
HGB BLD-MCNC: 8.6 G/DL (ref 11.5–16)
IMM GRANULOCYTES # BLD AUTO: 0.1 K/UL (ref 0–0.04)
IMM GRANULOCYTES NFR BLD AUTO: 1 % (ref 0–0.5)
INR PPP: 1.2 (ref 0.9–1.1)
LYMPHOCYTES # BLD: 0.9 K/UL (ref 0.8–3.5)
LYMPHOCYTES NFR BLD: 11 % (ref 12–49)
MCH RBC QN AUTO: 29.3 PG (ref 26–34)
MCHC RBC AUTO-ENTMCNC: 30.3 G/DL (ref 30–36.5)
MCV RBC AUTO: 96.6 FL (ref 80–99)
MONOCYTES # BLD: 0.8 K/UL (ref 0–1)
MONOCYTES NFR BLD: 9 % (ref 5–13)
NEUTS SEG # BLD: 6.5 K/UL (ref 1.8–8)
NEUTS SEG NFR BLD: 76 % (ref 32–75)
NRBC # BLD: 0 K/UL (ref 0–0.01)
NRBC BLD-RTO: 0 PER 100 WBC
PLATELET # BLD AUTO: 351 K/UL (ref 150–400)
PMV BLD AUTO: 10.5 FL (ref 8.9–12.9)
POTASSIUM SERPL-SCNC: 4.4 MMOL/L (ref 3.5–5.1)
PROTHROMBIN TIME: 12.6 SEC (ref 9–11.1)
RBC # BLD AUTO: 2.94 M/UL (ref 3.8–5.2)
SERVICE CMNT-IMP: ABNORMAL
SERVICE CMNT-IMP: ABNORMAL
SODIUM SERPL-SCNC: 138 MMOL/L (ref 136–145)
WBC # BLD AUTO: 8.5 K/UL (ref 3.6–11)

## 2020-03-22 PROCEDURE — 85610 PROTHROMBIN TIME: CPT

## 2020-03-22 PROCEDURE — 36415 COLL VENOUS BLD VENIPUNCTURE: CPT

## 2020-03-22 PROCEDURE — 74011250637 HC RX REV CODE- 250/637: Performed by: PODIATRIST

## 2020-03-22 PROCEDURE — 82962 GLUCOSE BLOOD TEST: CPT

## 2020-03-22 PROCEDURE — 80048 BASIC METABOLIC PNL TOTAL CA: CPT

## 2020-03-22 PROCEDURE — 85025 COMPLETE CBC W/AUTO DIFF WBC: CPT

## 2020-03-22 PROCEDURE — 74011636637 HC RX REV CODE- 636/637: Performed by: PODIATRIST

## 2020-03-22 RX ORDER — HYDRALAZINE HYDROCHLORIDE 100 MG/1
50 TABLET, FILM COATED ORAL 3 TIMES DAILY
Qty: 90 TAB | Refills: 4 | Status: SHIPPED | OUTPATIENT
Start: 2020-03-22 | End: 2020-03-22 | Stop reason: SDUPTHER

## 2020-03-22 RX ORDER — DILTIAZEM HYDROCHLORIDE 180 MG/1
180 CAPSULE, COATED, EXTENDED RELEASE ORAL DAILY
Qty: 30 CAP | Refills: 4 | Status: SHIPPED | OUTPATIENT
Start: 2020-03-22 | End: 2020-01-01

## 2020-03-22 RX ORDER — INSULIN GLARGINE 100 [IU]/ML
10 INJECTION, SOLUTION SUBCUTANEOUS DAILY
Qty: 10 ML | Refills: 5 | Status: SHIPPED | OUTPATIENT
Start: 2020-03-22 | End: 2020-03-26

## 2020-03-22 RX ORDER — HYDROCODONE BITARTRATE AND ACETAMINOPHEN 5; 325 MG/1; MG/1
1 TABLET ORAL
Qty: 6 TAB | Refills: 0 | Status: SHIPPED | OUTPATIENT
Start: 2020-03-22 | End: 2020-03-25

## 2020-03-22 RX ORDER — BUMETANIDE 1 MG/1
1 TABLET ORAL DAILY
Qty: 30 TAB | Refills: 3 | Status: SHIPPED | OUTPATIENT
Start: 2020-03-22 | End: 2020-03-26

## 2020-03-22 RX ORDER — METOPROLOL SUCCINATE 100 MG/1
100 TABLET, EXTENDED RELEASE ORAL DAILY
Qty: 60 TAB | Refills: 9 | Status: SHIPPED | OUTPATIENT
Start: 2020-03-22 | End: 2020-03-26

## 2020-03-22 RX ORDER — HYDRALAZINE HYDROCHLORIDE 50 MG/1
50 TABLET, FILM COATED ORAL 3 TIMES DAILY
Qty: 90 TAB | Refills: 4 | Status: SHIPPED | OUTPATIENT
Start: 2020-03-22 | End: 2020-01-01

## 2020-03-22 RX ORDER — DOXYCYCLINE 100 MG/1
100 TABLET ORAL 2 TIMES DAILY
Qty: 14 TAB | Refills: 0 | Status: SHIPPED | OUTPATIENT
Start: 2020-03-22 | End: 2020-03-29

## 2020-03-22 RX ORDER — HYDRALAZINE HYDROCHLORIDE 50 MG/1
50 TABLET, FILM COATED ORAL 3 TIMES DAILY
Qty: 90 TAB | Refills: 4 | Status: SHIPPED | OUTPATIENT
Start: 2020-03-22 | End: 2020-03-22 | Stop reason: SDUPTHER

## 2020-03-22 RX ADMIN — DILTIAZEM HYDROCHLORIDE 60 MG: 60 TABLET, FILM COATED ORAL at 07:48

## 2020-03-22 RX ADMIN — METOPROLOL SUCCINATE 100 MG: 50 TABLET, EXTENDED RELEASE ORAL at 08:06

## 2020-03-22 RX ADMIN — MELATONIN 5 TABLET: at 08:05

## 2020-03-22 RX ADMIN — DILTIAZEM HYDROCHLORIDE 60 MG: 60 TABLET, FILM COATED ORAL at 12:01

## 2020-03-22 RX ADMIN — Medication 1 AMPULE: at 08:06

## 2020-03-22 RX ADMIN — Medication 10 ML: at 06:10

## 2020-03-22 RX ADMIN — SERTRALINE HYDROCHLORIDE 50 MG: 50 TABLET ORAL at 08:07

## 2020-03-22 RX ADMIN — INSULIN LISPRO 2 UNITS: 100 INJECTION, SOLUTION INTRAVENOUS; SUBCUTANEOUS at 12:01

## 2020-03-22 RX ADMIN — HYDRALAZINE HYDROCHLORIDE 50 MG: 50 TABLET, FILM COATED ORAL at 06:05

## 2020-03-22 RX ADMIN — BUSPIRONE HYDROCHLORIDE 10 MG: 10 TABLET ORAL at 08:06

## 2020-03-22 RX ADMIN — CYANOCOBALAMIN TAB 500 MCG 1000 MCG: 500 TAB at 08:06

## 2020-03-22 NOTE — ROUTINE PROCESS
6 Prescriptions given. Discharge medications reviewed with patient and appropriate educational materials and side effects teaching were provided. IV removed and catheter intact. Teaching on signs and symptoms of infection discussed and teachback successful. Patient escorted to entrance for discharge. All personal belongings with patient

## 2020-03-22 NOTE — OP NOTES
[address] 
OPERATIVE REPORT Name:  Cherylene Oris 
MR#:  [id number] :  1960 ACCOUNT #:  [de-identified] DATE OF SERVICE:  2020 PREOPERATIVE DIAGNOSIS:  Diabetic ulcer to bone, left foot. POSTOPERATIVE DIAGNOSIS:  Diabetic ulcer to bone, left foot. PROCEDURE PERFORMED:  Debridement of wound and placement of amniotic cord graft left foot, 46 cm2. SURGEON:  Kira Cordon DPM 
 
ASSISTANT:  None applicable. ANESTHESIA:  IV sedation with local. 
 
COMPLICATIONS:  None. SPECIMENS REMOVED:  None. IMPLANTS:  Amniotic cord graft. ESTIMATED BLOOD LOSS:  Less than 5 mL. PROCEDURE IN DETAIL:  The patient was brought into the operating room and placed supine upon the OR table. After the administration of IV sedation, I injected the left ankle with 27 mL of 0.5% plain Marcaine. The foot was then prepped and draped in the usual sterile technique. A time-out was observed. Attention was directed to the wounds on plantar lateral left foot, plantar left heel, and lateral left foot near the fifth metatarsal base. All three wounds had a hyperkeratotic border and a fibrotic base. No active purulence was noted. The wounds were circumscribed with a #15 scalpel blade to achieve clean margins of the wound. The base of the wound was then sharply debrided with combination of a #15 scalpel and small curved Metzenbaum scissors to the level of the periosteum of the lateral wound and to the level of muscle on the plantar lateral wound and to the level of subcutaneous tissue plantar left heel. I then used the [employer] ultrasonic debriding tool to further clean and debride each of these wounds to clean lightly bleeding bases. The wounds were also flushed with sterile irrigation. The amniotic cord was dethawed in sterile saline for 5 minutes. It was then cut to size and placed over all three of the wounds.   The wound on the lateral left foot was 6-cm in diameter. The wound on the plantar lateral left foot was approximately 4 x 4 cm. The wound on the plantar left heel was approximately 2-cm in diameter. The graft was secured in place with surgical staples. The foot was cleaned and dried, covered with Adaptic, then moist 4 x 4 gauze and then rolls of Tejinder, Kerlix and Coban. The patient was then transported to the recovery room with vital signs stable and vascular status intact. The patient will remain until stable for transfer back up to her hospital bed. The patient may be discharged at any time, at least as far as the condition of the left foot is concerned, though the primary care team that may choose to keep her in-house due to her other medical conditions. If she is discharged, she will need to be nonweightbearing on the left foot for approximately three days and then limited weightbearing after that. The bandage should be left in place in left foot and changed approximately on 03/25 with additional moist gauze. I will continue to follow the patient while she remains in-house. Ap Carranza DPM [id number] 
D:  03/21/2020 16:17 
T:  03/21/2020 23:52 
JOB #:  [id number]

## 2020-03-22 NOTE — PROGRESS NOTES
Appointment Saved Appointment Information The following appointments have been successfully scheduled: 
 
Date/time Tuesday, March 24, 2020 01:45 PM 
Patient Lucia Whitman 1960 (60GZ F) #[id number] W#[id number] [facility] OFF[facility]-PCP Appointment type Transitional Care Provider Fran Huizar

## 2020-03-22 NOTE — ROUTINE PROCESS
Bedside shift change report given to Ambrosio (oncoming nurse) by Janny Galvan (offgoing nurse). Report included the following information SBAR, Kardex, Intake/Output and MAR.

## 2020-03-22 NOTE — PROGRESS NOTES
Hospitalist Progress Note NAME: Vani Solorio :  1960 MRN:  [id number] Assessment / Plan: 
 
Left leg cellulitis POA Left diabetic foot infection POA Blood cultures with no growth to date CT left leg Cellulitis and soft tissue gas plantar to the proximal fifth metatarsal and cuboid. No drainable fluid collection within the limitation of no IV contrast. 
      No CT evidence of osteomyelitis Venous US LLE negative for DVT Wound Cx= growing MSSA & sensitive Proteus, grp B strep Debridement left foot wound and bone biopsy  3/10 Bone pathology with no osteomyelitis Continue with Levaquin (renally dosed) Prefer to add vanco for better GPC coverage Allergic to PCNs with hives Graft surgery PLACED LEFT FOOT 3/21/2020 Wound care per Dr Layvonne Spurling, he will see on wednesday Pt anxious for discharge today, Dr Layvonne Spurling cleared,  
  
DM type 2 with renal manifestations POA Hypoglycemia 
lantus 10 units plus SSI 
, 131, 116, 110, 215, 108 Cont SSI lispro as needed 
  
Atrial fibrillation/RVR 3/9/2019 POA 
- s/p Cardizem bolus and Cardizem gtt with good rate control - No ral diltiazem 
- Continue metoprolol 
- Continue coumadin (hold today dose); pharmacy consult for dosing 
- supra therapeutic resolved initially  INR 1.3 today - Cardiology consult reviewed and appreciated  
- resume coumadin, will get level checked in 1 week as outpatient Elevated troponin - Likely due to demand with rapid atrial fibrillation - Downtrending Recurrent candida vaginitis - s/p Fluconazole 150 mg PO x1 Acute Encephalopathy 3/14 due to hypoglycemia resolved Seen in pre -op holding earlier, alert and appropriate Ammonia = 25  
CT head = Moderate small vessel ischemic changes. Chronic sinus disease. No acute intracranial abnormality Examination non focal 
  
CKD stage 4- Cr stable at ~ 2.4 
- Appears to be stable at baseline creatinine - Renally dose medications  
- hold sodium bicarbonate as per nephro 
- hold BUMEX 1 mg held as per nephrology - s/p IV iron - Epogen   
-Nephrology on board. Greatly appreciate input  
  
HTN 
- Continue toprol XL, cardizem, hydralazine, cardura 
  
Anxiety  
- Continue Zoloft, Buspar 
  
CHF 
- No exacerbation 
- Off  Bumex  
  
SSS 
- Pacemaker Morbid obesity due to BMI >35 with DM, HTN / Body mass index is 35.23 kg/m². Code status: Full Prophylaxis: anticoagulated with warfarin Recommended Disposition: Home w/Family versus HH/SNF TBD post operatively on monday Subjective: Chief Complaint / Reason for Physician Visit: follow up rapid atrial fibrillation and cellulitis, foot ulcer \"I want to go home today\" No complaints, feels ready to go home No N/V or diarrhea, CP, SOB, N/V Otherwise no complaints Review of Systems: 
Symptom Y/N Comments  Symptom Y/N Comments Fever/Chills n   Chest Pain n   
Poor Appetite    Edema y Cough n   Abdominal Pain n   
Sputum    Joint Pain SOB/CHOW n   Pruritis/Rash Nausea/vomit n   Tolerating PT/OT Diarrhea n   Tolerating Diet y Constipation    Other Could NOT obtain due to:   
 
Objective: VITALS:  
Last 24hrs VS reviewed since prior progress note. Most recent are: 
Patient Vitals for the past 24 hrs: 
 Temp Pulse Resp BP SpO2  
03/22/20 0731 98.2 °F (36.8 °C) 82 16 128/56 97 % 03/22/20 0333 98 °F (36.7 °C) 79 18 144/62 97 % 03/21/20 2323 97.7 °F (36.5 °C) 67 18 133/61 97 % 03/21/20 1936 97.5 °F (36.4 °C) 68 17 109/43 99 % 03/21/20 1735 97.7 °F (36.5 °C) 77 16 110/81 99 % 03/21/20 1635  70 14 131/56 98 % 03/21/20 1630 97.5 °F (36.4 °C) 67 14 132/60 100 % 03/21/20 1625  67 22 131/61 100 % 03/21/20 1620  68 18 121/58 100 % 03/21/20 1615  76 18 124/57 100 % 03/21/20 1610  68 11 121/56 100 % 03/21/20 1605  66 10 115/53 100 % 03/21/20 1600  72 14 107/47 97 % 03/21/20 1559 97.6 °F (36.4 °C) 70 16 99/54 98 % 03/21/20 1557 97.6 °F (36.4 °C) 68 15 99/54 99 % 03/21/20 1315 98.7 °F (37.1 °C) 76 18 133/65 99 % 03/21/20 1247  80  132/64   
03/21/20 1140 98 °F (36.7 °C) 89 18 136/74 98 % 03/21/20 0856  99  142/74  Intake/Output Summary (Last 24 hours) at 3/22/2020 4900 Last data filed at 3/22/2020 5445 Gross per 24 hour Intake 620 ml Output 305 ml Net 315 ml PHYSICAL EXAM: 
General: WD, WN. Alert, cooperative, no acute distress   
EENT:  EOMI. Anicteric sclerae. MMM Resp:  CTA bilaterally, no wheezing or rales. No accessory muscle use CV:  Irregularly irregular  rhythm, +LLE edema 1-2+ Neurologic:  Alert and oriented X3 today (back to baseline MS today) Psych:   Good insight. Not anxious nor agitated Skin:  No jaundice. Erythema of distal left lower extremity and foot, with heat and tenderness. Reviewed most current lab test results and cultures  YES Reviewed most current radiology test results   YES Review and summation of old records today    NO Reviewed patient's current orders and MAR    YES 
PMH/SH reviewed - no change compared to H&P 
________________________________________________________________________ Care Plan discussed with: 
  Comments Patient x Family RN x Care Manager Consultant Multidiciplinary team rounds were held today with , nursing, pharmacist and clinical coordinator. Patient's plan of care was discussed; medications were reviewed and discharge planning was addressed. ________________________________________________________________________ Comments >50% of visit spent in counseling and coordination of care    
________________________________________________________________________ Yris Whelan MD  
 
Procedures: see electronic medical records for all procedures/Xrays and details which were not copied into this note but were reviewed prior to creation of Plan. LABS: 
I reviewed today's most current labs and imaging studies. Pertinent labs include: 
Recent Labs  
  03/22/20 0327 03/20/20 
0052 WBC 8.5 12.8* HGB 8.6* 9.6* HCT 28.4* 31.7*  459* Recent Labs  
  03/22/20 0327 03/21/20 
0506 03/20/20 
8376  136 133* K 4.4 3.9 3.9 * 105 102 CO2 22 23 25 * 134* 167* BUN 27* 32* 35* CREA 2.12* 2.38* 2.48* CA 8.6 8.7 9.2 INR 1.2* 1.3* 1.4* Signed: Maria D Jarquin MD

## 2020-03-22 NOTE — PROGRESS NOTES
TEE: Follow-up Care Appointments (PCP, Cardiology, Nephrology, Podiatry) Pt requesting to discharge home today per MD. Therapy recommendations for SNF or intensive HH therapy program. MD/CM discussed with pt, declining SNF placement. Pt is agreeable to [facility] services but declined to have CM setup services, wants to discuss plan with PCP directly and have her coordinate/place orders for [facility] therapies. CM provided education re: continuity of care (if arrange [facility] services today while inpatient could have [facility] quickly). Pt still declined. CM advised to contact PCP office Monday AM to request services ASAP. Pt verbalized understanding. Pt to call and schedule PCP, Cardiology, and Nephrology f/u appointments. Per Podiatrist, requested for pt to come to office on 3/25/20 for wound dressing change. All information entered into pt AVS.  
 
Pt has no additional CM needs at this time. Care Management Interventions PCP Verified by CM: Yes(3 weeks ago) Mode of Transport at Discharge: Self(Spouse will transport) Transition of Care Consult (CM Consult): Discharge Planning(for SN? No previous HH or rehab experiance) Discharge Durable Medical Equipment: No(Very independent including driving.) Health Maintenance Reviewed: Yes Physical Therapy Consult: No 
Occupational Therapy Consult: No 
Speech Therapy Consult: No 
Current Support Network: Lives with Spouse Confirm Follow Up Transport: Family The Plan for Transition of Care is Related to the Following Treatment Goals : SNF (pt declined), HH PT/OT/SN (pt declined to schedule at this time, will call PCP to do so) The Patient and/or Patient Representative was Provided with a Choice of Provider and Agrees with the Discharge Plan?: No 
Name of the Patient Representative Who was Provided with a Choice of Provider and Agrees with the Discharge Plan: Stas Justice (patient) Discharge Location Discharge Placement: Home with family assistance(Plans to call PCP directly to setup [facility] services) Donell Galeazzi, [facility] [phone]

## 2020-03-22 NOTE — DISCHARGE INSTRUCTIONS
Patient Discharge Instructions    Spencer Singer / [id number] : 1960    Admitted 3/8/2020 Discharged: 3/22/2020         DISCHARGE DIAGNOSIS:   Active Problems:    Atrial fibrillation with rapid ventricular response ([address] (3/8/2020)      Left leg cellulitis (3/8/2020)      Elevated troponin (3/8/2020)      Atrial fibrillation with RVR ([address] (3/8/2020)           What to do at Home    1. Recommended diet: Diabetic Diet    2. Recommended activity: Activity as tolerated except no weight bearing on left foot till you see Dr Brenden Stewart on Wednesday    3.  If you experience any of the following symptoms then please call your primary care physician or return to the emergency room if you cannot get hold of your doctor:   Fevers > 100.5, chills   Nausea or vomiting, persistent diarrhea > 24 hours   Blood in stool or black stools   Chest pain or SOB      Follow-up Information     Follow up With Specialties Details Why Johnnie Alvarez MD Internal Medicine Schedule an appointment as soon as possible for a visit in 5 days need to get INR level checked in 5 days [address] [facility] Robert      Alfonso Gastelum DPM Podiatry Schedule an appointment as soon as possible for a visit in 3 days wound dressing change [address] [facility] 83. [phone]      Tomy Kaiser MD Nephrology In 6 weeks follow up chronic kidney disease [address] [facility] [address] Leopoldo Kocher, MD Cardiology, [address] [facility] Vascular Surgery, Internal Medicine Schedule an appointment as soon as possible for a visit in 3 weeks follow up atrial fib Marisela Bryant [phone]  [phone]          Reduce lantus to 10 units, if blood sugars at home are consistently greater than 200, then increase to 20 units    Resume coumadin, need to get level checked this week    Note changes to BP meds          Information obtained by :  I understand that if any problems occur once I am at home I am to contact my physician. I understand and acknowledge receipt of the instructions indicated above.                                                                                                                                            Physician's or R.N.'s Signature                                                                  Date/Time                                                                                                                                              Patient or Representative Signature                                                          Date/Time

## 2020-03-22 NOTE — DISCHARGE SUMMARY
Hospitalist Discharge Note NAME: Shirin Ferguson :  1960 MRN:  [id number] Admit date: 3/8/2020 Discharge date: 20 PCP: Victoria Wisdom MD 
 
Discharge Diagnoses: 
 
Left leg cellulitis POA Left diabetic foot infection with ulcer POA 
  
DM type 2 with renal manifestations POA Hypoglycemia on insulin POA 
  
Permanent atrial fibrillation with RVR 3/9/2019 POA Elevated troponin Recurrent candida vaginitis Acute Encephalopathy 3/14 due to hypoglycemia resolved 
  
CKD stage 4 POA  Cr stable at ~ 2.4 
  
Essential HTN POA 
  
Anxiety  
  
Chronic diastolic CHF POA 
 
SSS s/p Pacemaker Morbid obesity POA  Body mass index is 35.23 kg/m². Code status: Full Discharge Medications: 
Current Discharge Medication List  
  
START taking these medications Details HYDROcodone-acetaminophen (NORCO) 5-325 mg per tablet Take 1 Tab by mouth every six (6) hours as needed for Pain for up to 3 days. Max Daily Amount: 4 Tabs. Indications: pain 
Qty: 6 Tab, Refills: 0 Associated Diagnoses: Left leg cellulitis  
  
doxycycline (ADOXA) 100 mg tablet Take 1 Tab by mouth two (2) times a day for 7 days. Qty: 14 Tab, Refills: 0  
  
dilTIAZem CD (CARDIZEM CD) 180 mg ER capsule Take 1 Cap by mouth daily. Qty: 30 Cap, Refills: 4 CONTINUE these medications which have CHANGED Details  
insulin glargine (Lantus U-100 Insulin) 100 unit/mL injection 10 Units by SubCUTAneous route daily. Inject 20 units daily 
Qty: 10 mL, Refills: 5  
  
metoprolol succinate (TOPROL-XL) 100 mg tablet Take 1 Tab by mouth daily. Qty: 60 Tab, Refills: 9  
  
bumetanide (BUMEX) 1 mg tablet Take 1 Tab by mouth daily. Qty: 30 Tab, Refills: 3  
  
hydrALAZINE (APRESOLINE) 100 mg tablet Take 0.5 Tabs by mouth three (3) times daily. Qty: 90 Tab, Refills: 4 CONTINUE these medications which have NOT CHANGED Details !! warfarin (COUMADIN) 4 mg tablet Take 2 mg by mouth five (5) days a week. Take 1 tablet (4 mg) on Sun and Wed and a half tablet (2 mg) the other 5 days. !! warfarin (COUMADIN) 4 mg tablet Take 4 mg by mouth two (2) times a week. Take 1 tablet (4 mg) on Sun and Wed and a half tablet (2 mg) the other 5 days. acetaminophen 500 mg tab 500 mg, diphenhydrAMINE 25 mg cap 25 mg Take 2 Doses by mouth nightly. sertraline (ZOLOFT) 50 mg tablet TAKE ONE AND ONE-HALF (1 & 1/2) TABLET BY MOUTH DAILY Qty: 45 Tab, Refills: 5 Associated Diagnoses: Anxiety as acute reaction to gross stress  
  
doxazosin (CARDURA) 4 mg tablet TAKE ONE TABLET BY MOUTH ONCE NIGHTLY Qty: 30 Tab, Refills: 10  
 Associated Diagnoses: Essential hypertension  
  
busPIRone (BUSPAR) 10 mg tablet TAKE ONE TABLET BY MOUTH TWICE A DAY Qty: 60 Tab, Refills: 10  
 Associated Diagnoses: Anxiety as acute reaction to gross stress  
  
metolazone (ZAROXOLYN) 5 mg tablet Take 1 Tab by mouth daily as needed (take if develop increased LE edema, weight gain > 5 pounds. ). Qty: 30 Tab, Refills: 1 cholecalciferol, VITAMIN D3, (VITAMIN D3) 5,000 unit tab tablet Take 5,000 Units by mouth daily. vitamin A 8,000 unit capsule Take 8,000 Units by mouth daily. insulin lispro (HUMALOG) 100 unit/mL kwikpen 2 units with dinner for sugars over 200 Qty: 1 Package, Refills: 0  
  
cyanocobalamin (VITAMIN B-12) 1,000 mcg sublingual tablet Take 1,000 mcg by mouth daily. STOP taking these medications  
  
 potassium chloride SR (KLOR-CON 10) 10 mEq tablet Comments:  
Reason for Stopping:   
   
 cloNIDine HCl (CATAPRES) 0.3 mg tablet Comments:  
Reason for Stopping: Follow-up Information Follow up With Specialties Details Why Contact Info Abrahan Galan MD Internal Medicine Schedule an appointment as soon as possible for a visit in 5 days need to get INR level checked in 5 days [address] [facility] [address] [facility] [address] [phone] Nick Roth DPM Podiatry Schedule an appointment as soon as possible for a visit in 3 days wound dressing change [place] [address] [facility] 
[phone] Mehdi Fuentes MD Nephrology In 6 weeks follow up chronic kidney disease [address] Shar[address] [facility] 
[phone] Allan Roblero MD Cardiology, [address] [facility] Vascular Surgery, Internal Medicine Schedule an appointment as soon as possible for a visit in 3 weeks follow up atrial fib [address] 
[phone] Time spent on discharge:   I spent greater than 30 minutes on discharge, seeing and examining the patient, reconciling home meds and new meds, coordinating care with case management, doing the discharge papers and the D/C summary Discharge disposition: home Discharge Condition: Stable Summary of admission H+P(copied from Dr Zharaa Alvarado  Note): CHIEF COMPLAINT: left leg swelling  
  
HISTORY OF PRESENT ILLNESS:    
Katie Land is a 60 y.o.  female who presents with left leg swelling onset yesterday . She claims that for the past 3 days she has been having fever and chills and yesterday she saw that her left leg was swollen and red. She also had associated pain. She has been following podiatry out patient for her foot ulcers. In the ER CT of her leg Unenhanced CT Left Foot show no bone erosion, periostitis or other radiographic features of overt osteomyelitis. There is plantar soft tissue emphysema. She was given Vancomycin. She was also in afib/rvr with hr > 150. She received a bolus Cardizem and was started on Cardizem drip for rate control. She also was given po metoprolol. ER consulted Podiatry and Cardiology.  
  
We were asked to admit for work up and evaluation of the above problems.  
  
    
Past Medical History:  
Diagnosis Date  Acquired lymphedema    
  Right arm  Arrhythmia    
 Asthma    
 Atrial fibrillation [facility])    
  Dr. Elaine Rashid and Dr. Pao Gonzales Atrial flutter [facility])    
 Cancer [facility])    
  bilateral breast  
 Chronic kidney disease    
 Diabetes mellitus type II    
 Dizziness    
 Essential hypertension    
 Hyperlipidemia    
 Hypertension    
 Long term (current) use of anticoagulants    
 Morbid obesity ([facility] 3/14/2012  Osteoarthritis    
 Pacemaker    
 Sick sinus syndrome ([facility] CT left lower extremity read by radiology FINDINGS:  
Bones: Bones are osteopenic. No acute fracture. Chronic volume loss of 
the second metatarsal head. Chronic dislocation of the second toe. No CT 
evidence of osteomyelitis. Joint fluid: None. Articulations: Multifocal TMT joint osteoarthritis, moderate in the fifth TMT 
joint.  Tendons: No full-thickness tendon tear. Muscles: Severe atrophy. Soft tissue mass: No evidence of mass. Skin breakdown is plantar to the 
calcaneus and plantar to the cuboid and fifth metatarsal. Soft tissue gas and 
swelling surrounding the proximal fifth metatarsal. 
IMPRESSION:  
1. Cellulitis and soft tissue gas plantar to the proximal fifth metatarsal and 
cuboid. No drainable fluid collection within the limitation of no intravenous 
contrast. 
2. No CT evidence of osteomyelitis. However, the patient is at risk for 
developing osteomyelitis of the proximal fifth metatarsal. 
 
Head CT read by radiology FINDINGS: 
The ventricles and sulci are normal in size, shape and configuration and 
midline. Moderate small vessel ischemic changes are seen in the periventricular 
white matter. There is no intracranial hemorrhage, extra-axial collection, mass, 
mass effect or midline shift. The basilar cisterns are open. No acute infarct 
is identified. The bone windows demonstrate no abnormalities.  There is complete 
opacification of the maxillary sinuses bilaterally and right sphenoid sinus, 
 with calcifications and thickening of the sinus walls. Vascular calcification is noted. IMPRESSION: Moderate small vessel ischemic changes. Chronic sinus disease. No 
acute intracranial abnormality. LE doppler US read by radiology FINDINGS: 
No evidence of acute deep vein thrombosis in the left common femoral, superficial femoral, popliteal, posterior tibial, and peroneal veins. The veins were imaged in the transverse and longitudinal planes. The vessels showed normal color filling and compressibility. Doppler interrogation showed phasic and spontaneous flow. Left leg duplex venous Doppler examination is performed from the inguinal ligament to the midcalf. Deep venous structures are assessed for compressibility and augmentation. Both wave form and color flow analyses are performed. FINDINGS: 
There is no thrombus. IMPRESSION:  NO DVT OF THE LEFT LEG. 
  
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 · Right toe PPG is normal. Right TBI is 0.57. · Left toe PPG is normal. Left TBI is 0.42. 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 PVR waveforms in the right lower thigh are normal. PVR waveforms in the right calf are normal. PVR waveforms in the right ankle are normal. PVR waveforms at the right metatarsal region are normal. PVR waveforms in the left lower thigh are normal. PVR waveforms in the left calf are normal. PVR waveforms in the left ankle are normal. PVR waveforms of the left metatarsal region are normal. Right toe PPG is normal. Left toe PPG is normal.  
 
Echo TTE read by radiology Normal cavity size and systolic function (ejection fraction normal). Moderate concentric  
      hypertrophy. Estimated left ventricular ejection fraction is 55 - 60%. Dilated left atrium. Dilated right ventricle. Mildly reduced systolic function. Mitral valve thickening. Moderate mitral valve regurgitation is present. Severely elevated central venous pressure (15+ mmHg); IVC diameter is larger than 21 mm 
        and collapses less than 50% with respiration. Hospital course:  
 
Please note I saw only last 2 days of admit Left leg cellulitis POA Left diabetic foot infection POA Blood cultures with no growth to date CT left leg Cellulitis and soft tissue gas plantar to the proximal fifth metatarsal and cuboid. No drainable fluid collection within the limitation of no IV contrast. 
      No CT evidence of osteomyelitis Venous US LLE negative for DVT Wound Cx= growing MSSA & sensitive Proteus, grp B strep Debridement left foot wound and bone biopsy  3/10 Bone pathology with no osteomyelitis Levaquin and flagyl 2 days vanco for better GPC coverage Allergic to PCNs with hives PO doxycycline Graft surgery PLACED LEFT FOOT 3/21/2020 Wound care per Dr Cary Wu, he will see on wednesday Pt anxious for discharge today, Dr Cary Wu cleared, he will see on Wednesday Bandage to remain in place till then, no weight bearing on left leg till then 
  
DM type 2 with renal manifestations POA Hypoglycemia 
lantus 10 units plus SSI 
, 131, 116, 110, 215, 108 Cont SSI lispro as needed Baseline uses 20 units lantus at home BS stable on 10 units in house, will continue at home If BS consistently > 200 at home, will resume prior 20 units 
  
Atrial fibrillation/RVR 3/9/2019 POA 
- s/p Cardizem bolus and Cardizem gtt with good rate control - Now on oral diltiazem, change to cardizem CD 
- Continue metoprolol at reduced dose - Continue coumadin,  pharmacy consult for dosing 
- supratherapeutic INR before last OR,  
- Cardiology consult reviewed and appreciated  
- resume coumadin, will get level checked within 1 week as outpatient Elevated troponin - Likely due to demand with rapid atrial fibrillation - Downtrending Recurrent candida vaginitis - s/p Fluconazole 150 mg PO x1 Acute Encephalopathy 3/14 due to hypoglycemia resolved Seen in pre -op holding earlier, alert and appropriate Ammonia = 25  
CT head = Moderate small vessel ischemic changes. Chronic sinus disease. No acute intracranial abnormality Examination non focal 
Neurology saw 
  
CKD stage 4- Cr stable at ~ 2.2 
- Appears to be stable at baseline creatinine - Renally dose medications  
- hold sodium bicarbonate as per nephro 
- hold BUMEX 1 mg held as per nephrology - s/p IV iron - Epogen   
-Nephrology on board. Greatly appreciate input  
  
Essential HTN POA 
- Continue toprol XL and hydralazine at reduced doses - Cardizem CD, cardura 
-Stop clonidine 
  
Anxiety  
- Continue Zoloft, Buspar 
  
Chronic diastolic CHF 
- No exacerbation 
- Off  Bumex  
  
SSS s/p Pacemaker Morbid obesity POA  Body mass index is 35.23 kg/m². Code status: Full Prophylaxis: anticoagulated with warfarin Recommended Disposition: Home w/Family versus HH/SNF TBD post operatively on monday Subjective: Chief Complaint / Reason for Physician Visit: follow up rapid atrial fibrillation and cellulitis, foot ulcer \"I want to go home today\" No complaints, feels ready to go home No N/V or diarrhea, CP, SOB, N/V Otherwise no complaints Review of Systems: 
Symptom Y/N Comments  Symptom Y/N Comments Fever/Chills n   Chest Pain n   
Poor Appetite    Edema y Cough n   Abdominal Pain n   
Sputum    Joint Pain SOB/CHOW n   Pruritis/Rash Nausea/vomit n   Tolerating PT/OT Diarrhea n   Tolerating Diet y Constipation    Other Could NOT obtain due to:   
 
Objective: VITALS:  
Last 24hrs VS reviewed since prior progress note. Most recent are: 
Patient Vitals for the past 24 hrs: 
 Temp Pulse Resp BP SpO2  
03/22/20 1112 97.7 °F (36.5 °C) 82 16 125/55 99 % 03/22/20 0731 98.2 °F (36.8 °C) 82 16 128/56 97 % 03/22/20 0333 98 °F (36.7 °C) 79 18 144/62 97 % 03/21/20 2323 97.7 °F (36.5 °C) 67 18 133/61 97 % 03/21/20 1936 97.5 °F (36.4 °C) 68 17 109/43 99 % 03/21/20 1735 97.7 °F (36.5 °C) 77 16 110/81 99 % 03/21/20 1635  70 14 131/56 98 % 03/21/20 1630 97.5 °F (36.4 °C) 67 14 132/60 100 % 03/21/20 1625  67 22 131/61 100 % 03/21/20 1620  68 18 121/58 100 % 03/21/20 1615  76 18 124/57 100 % 03/21/20 1610  68 11 121/56 100 % 03/21/20 1605  66 10 115/53 100 % 03/21/20 1600  72 14 107/47 97 % 03/21/20 1559 97.6 °F (36.4 °C) 70 16 99/54 98 % 03/21/20 1557 97.6 °F (36.4 °C) 68 15 99/54 99 % 03/21/20 1315 98.7 °F (37.1 °C) 76 18 133/65 99 % 03/21/20 1247  80  132/64  Intake/Output Summary (Last 24 hours) at 3/22/2020 1211 Last data filed at 3/22/2020 7758 Gross per 24 hour Intake 620 ml Output 305 ml Net 315 ml PHYSICAL EXAM: 
General: WD, WN. Alert, cooperative, no acute distress   
EENT:  EOMI. Anicteric sclerae. MMM Resp:  CTA bilaterally, no wheezing or rales. No accessory muscle use CV:  Irregularly irregular  rhythm, +LLE edema 1-2+ Neurologic:  Alert and oriented X3 today (back to baseline MS today) Psych:   Good insight. Not anxious nor agitated Skin:  No jaundice. Erythema of distal left lower extremity and foot, with heat and tenderness. Reviewed most current lab test results and cultures  YES Reviewed most current radiology test results   YES Review and summation of old records today    NO Reviewed patient's current orders and MAR    YES 
PMH/SH reviewed - no change compared to H&P 
________________________________________________________________________ Care Plan discussed with: 
  Comments Patient x Family RN x Care Manager Consultant Multidiciplinary team rounds were held today with , nursing, pharmacist and clinical coordinator.   Patient's plan of care was discussed; medications were reviewed and discharge planning was addressed. ________________________________________________________________________ Comments >50% of visit spent in counseling and coordination of care    
________________________________________________________________________ Shila Allen MD  
 
Procedures: see electronic medical records for all procedures/Xrays and details which were not copied into this note but were reviewed prior to creation of Plan. LABS: 
I reviewed today's most current labs and imaging studies. Pertinent labs include: 
Recent Labs  
  03/22/20 
0327 03/20/20 
0052 WBC 8.5 12.8* HGB 8.6* 9.6* HCT 28.4* 31.7*  459* Recent Labs  
  03/22/20 
0327 03/21/20 
0506 03/20/20 
3996  136 133* K 4.4 3.9 3.9 * 105 102 CO2 22 23 25 * 134* 167* BUN 27* 32* 35* CREA 2.12* 2.38* 2.48* CA 8.6 8.7 9.2 INR 1.2* 1.3* 1.4* Signed: Shila Allen MD

## 2020-03-23 ENCOUNTER — PATIENT OUTREACH (OUTPATIENT)
Dept: INTERNAL MEDICINE CLINIC | Age: 60
End: 2020-03-23

## 2020-03-25 ENCOUNTER — PATIENT OUTREACH (OUTPATIENT)
Dept: INTERNAL MEDICINE CLINIC | Age: 60
End: 2020-03-25

## 2020-03-26 ENCOUNTER — OFFICE VISIT (OUTPATIENT)
Dept: INTERNAL MEDICINE CLINIC | Facility: CLINIC | Age: 60
End: 2020-03-26

## 2020-03-26 VITALS
DIASTOLIC BLOOD PRESSURE: 74 MMHG | TEMPERATURE: 97.6 F | HEART RATE: 79 BPM | BODY MASS INDEX: 37.18 KG/M2 | SYSTOLIC BLOOD PRESSURE: 136 MMHG | WEIGHT: 251 LBS | OXYGEN SATURATION: 98 % | HEIGHT: 69 IN | RESPIRATION RATE: 12 BRPM

## 2020-03-26 DIAGNOSIS — I48.0 PAF (PAROXYSMAL ATRIAL FIBRILLATION) (HCC): ICD-10-CM

## 2020-03-26 DIAGNOSIS — L03.116 LEFT LEG CELLULITIS: ICD-10-CM

## 2020-03-26 DIAGNOSIS — S90.32XA TRAUMATIC HEMATOMA OF FOOT, LEFT, INITIAL ENCOUNTER: ICD-10-CM

## 2020-03-26 DIAGNOSIS — N18.4 CKD (CHRONIC KIDNEY DISEASE), STAGE IV (HCC): ICD-10-CM

## 2020-03-26 DIAGNOSIS — L97.423 DIABETIC ULCER OF LEFT MIDFOOT ASSOCIATED WITH TYPE 2 DIABETES MELLITUS, WITH NECROSIS OF MUSCLE (HCC): ICD-10-CM

## 2020-03-26 DIAGNOSIS — E11.628 TYPE 2 DIABETES MELLITUS WITH LEFT DIABETIC FOOT INFECTION (HCC): Primary | ICD-10-CM

## 2020-03-26 DIAGNOSIS — L97.422 DIABETIC ULCER OF LEFT HEEL ASSOCIATED WITH TYPE 2 DIABETES MELLITUS, WITH FAT LAYER EXPOSED (HCC): ICD-10-CM

## 2020-03-26 DIAGNOSIS — E11.621 DIABETIC ULCER OF LEFT HEEL ASSOCIATED WITH TYPE 2 DIABETES MELLITUS, WITH FAT LAYER EXPOSED (HCC): ICD-10-CM

## 2020-03-26 DIAGNOSIS — L08.9 TYPE 2 DIABETES MELLITUS WITH LEFT DIABETIC FOOT INFECTION (HCC): Primary | ICD-10-CM

## 2020-03-26 DIAGNOSIS — E11.621 DIABETIC ULCER OF LEFT MIDFOOT ASSOCIATED WITH TYPE 2 DIABETES MELLITUS, WITH NECROSIS OF MUSCLE (HCC): ICD-10-CM

## 2020-03-26 PROBLEM — L03.115 CELLULITIS OF RIGHT LOWER EXTREMITY: Status: RESOLVED | Noted: 2019-08-09 | Resolved: 2020-03-26

## 2020-03-26 PROBLEM — I48.91 ATRIAL FIBRILLATION WITH RAPID VENTRICULAR RESPONSE (HCC): Status: RESOLVED | Noted: 2020-03-08 | Resolved: 2020-03-26

## 2020-03-26 PROBLEM — S21.002A OPEN BREAST WOUND, LEFT, INITIAL ENCOUNTER: Status: RESOLVED | Noted: 2019-06-07 | Resolved: 2020-03-26

## 2020-03-26 PROBLEM — R77.8 ELEVATED TROPONIN: Status: RESOLVED | Noted: 2020-03-08 | Resolved: 2020-03-26

## 2020-03-26 PROBLEM — Z90.13 H/O BILATERAL MASTECTOMY: Status: ACTIVE | Noted: 2020-03-26

## 2020-03-26 LAB
INR BLD: 1.5
PT POC: 18.4 SECONDS
VALID INTERNAL CONTROL?: YES

## 2020-03-26 RX ORDER — BUMETANIDE 1 MG/1
2 TABLET ORAL DAILY
Qty: 60 TAB | Refills: 3
Start: 2020-03-26 | End: 2020-01-01

## 2020-03-26 RX ORDER — WARFARIN 4 MG/1
TABLET ORAL
Qty: 60 TAB | Refills: 5
Start: 2020-03-26 | End: 2020-04-10

## 2020-03-26 RX ORDER — METOPROLOL SUCCINATE 100 MG/1
200 TABLET, EXTENDED RELEASE ORAL DAILY
Qty: 60 TAB | Refills: 9
Start: 2020-03-26 | End: 2020-01-01

## 2020-03-26 RX ORDER — INSULIN GLARGINE 100 [IU]/ML
INJECTION, SOLUTION SUBCUTANEOUS
Qty: 10 ML | Refills: 5 | Status: ON HOLD
Start: 2020-03-26 | End: 2020-01-01 | Stop reason: SDUPTHER

## 2020-03-26 NOTE — PROGRESS NOTES
HISTORY OF PRESENT ILLNESS Francisco Weiss is a 60 y.o. female. HPI  Ms. Abad Argueta is a 60y.o. year old female, she is seen today for Transition of Care services following a hospital discharge for cellulitis left leg and left diabetic foot infection with ulcer on Mar 22. .  Our office Nurse Navigator attemped an outreach to Ms. Shreyas Gong on Mar 23 and Mar 24 (within 2 business days of discharge) to complete medication reconciliation and a telephonic assessment of her condition. She has diabetes mellitus with neuropathy, nephropathy/ CKD and retinopathy, hypertension. , hyperlipidemia, Atrial fibrillation, SSS, PM and obesity She presented to the ED on March 8 with acute onset of swelling and erythema in left leg and 3 days of fever and chills. She has been treated for many months for diabetic ulcers on both feet and has had several other admissions for cellulitis. Exam showed a large hematoma along the left lateral foot that was draining serous fluid. Heart rate was in the 130-150 range, but she had not taken her a fib rate control medications for 2 days. CT of LLE showed plantar soft tissue ulcers, but no apparent osteomyelitis. Venous doppler showed no DVT. WBC was 16.0, INR was 3.7, lactic acid normal, sed rate 85, creat 2.17, Hgb  A1c 7.5, The a fib with RVR was treated with diltiazem drip and eventually transitioned to PO. Vancomycin and cefepime were started. . On Mar 10 she underwent debridement of left foot and bone bipsyoof 5th MT (which did not show osteomeylitis). Culture grew Proteus and MSSA. She was  transitioned to metroidazole and levofloxacin. Supratherapeutic INR delayed surgery for skin substitute graft which was eventually done Mar 21. Blood sugars ran <200 with some hypoglycemia related to decreased dietary intake. She had an episode of hyponatremia due to volume overload.  On Mar 14 she had an episode of AMS that postponed application of substitute skin graft. BS was 86. CT showed moderate small vessel disease. Neurology consultant was concerned that cefepime dose was not renally adjusted and may have caused AMS. R90, folic acid and ammonia were normal. EEG was normal. Mental status was normal on Mar 16. At time of discharge, SNF or intensive HH therapy was recommended. She declined SNF and declined to have [facility] set up as her  is a hoarder. Still finishing 7 days of Doxycycline. Has been to podiatrist, yesterday, and had dressing change, debridement and opening of \"pus pocket. \" she is to change bandage every other day and return to podiatrist on Mar 30. Pain level in foot is 5/10 intensity. Less pain if elevates foot and removes walking shoe. No fevers or chills. Erythema in leg is not present. Swelling is \"not bad. \" Appetite is much better since she is home. Blood sugars are 109-121. Denies palpitations, heart racing, dyspnea on exertion or lightheadedness. .  
 
 
Patient Active Problem List  
Diagnosis Code  History of breast cancer Z85.3  Asthma J45.909  Fe deficiency anemia D50.9  Status post ablation of atrial fibrillation Z98.890, Z86.79  
 Sick sinus syndrome ([facility]) I49.5  S/P cardiac pacemaker procedure Z95.0  Long term (current) use of anticoagulants Z79.01  
 Mixed hyperlipidemia E78.2  CKD (chronic kidney disease), stage IV ([facility]) N18.4  Systolic murmur H09.9  Essential hypertension I10  
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 E11.621, F87.066  
 Diabetic ulcer of right midfoot associated with type 2 diabetes mellitus, with fat layer exposed ([facility] E11.621, E77.068  Foot deformity, acquired, left M21.962  Foot deformity, right M21.961  Pes cavus Q66.70  Type 2 diabetes mellitus with left diabetic foot infection ([facility]) E11.628, L08.9  Traumatic hematoma of foot, left, initial encounter C10.56YC  Diabetic ulcer of left midfoot with necrosis of muscle ([facility] E11.621, R01.265  Left leg cellulitis L[address]. [address]  Atrial fibrillation with RVR ([facility]) I48.91 Past Medical History:  
Diagnosis Date  Acquired lymphedema Right arm  Arrhythmia  Asthma  Atrial fibrillation ([facility] Dr. Lev Laws and Dr. Knowles Apt [facility])  Cancer ([facility] bilateral breast  
 Chronic kidney disease  Diabetes mellitus type II   
 Dizziness  Essential hypertension  Hyperlipidemia  Hypertension  Long term (current) use of anticoagulants  Morbid obesity ([facility] 3/14/2012  Nausea & vomiting  Neuropathy  Osteoarthritis  Pacemaker  Sick sinus syndrome ([facility] Past Surgical History:  
Procedure Laterality Date  ABDOMEN SURGERY PROC UNLISTED    
 gastric bypass  GASTRIC BYPASS,OBESE<150CM SAW-EN-Y  7/08  
 [facility]  HX AFIB ABLATION    
 HX BREAST RECONSTRUCTION Bilateral   
 HX CARPAL TUNNEL RELEASE [address] [facility] [address] RIGHT MODIFIED RADICAL   
 HX MASTECTOMY Left   
 no lymphnode removal  
 HX MOHS PROCEDURES  1995  
 right  HX ORTHOPAEDIC Right   
 knee surgery  HX PACEMAKER    
  HX PACEMAKER    
 IR INSERT TUNL CVC W PORT OVER 5 YEARS    
 LAMINECTOMY,CERVICAL  1994  
 NEEDLE BIOPSY LIVER,W OTHR [address]  8.21.07  
 [place] [address] AND 1994  [place] TRABECULOPLASTY BY LASER SURGERY    
 US GUIDE ASP OVARIAN CYST ABD APPROACH  8.21.07  
 RIGHT Social History Socioeconomic History  Marital status:  Spouse name: Not on file  Number of children: Not on file  Years of education: Not on file  Highest education level: Not on file Tobacco Use  Smoking status: Never Smoker  Smokeless tobacco: Never Used Substance and Sexual Activity  Alcohol use: Yes Comment: rare  Drug use: No  
 
Family History Problem Relation Age of Onset  Cancer Mother  Heart Disease Mother  Alcohol abuse Father  Diabetes Brother  Hypertension Brother Allergies Allergen Reactions  Ace Inhibitors Cough  Amlodipine Swelling  Avapro [Irbesartan] Unable to Obtain  Bactrim [Sulfamethoxazole-Trimethoprim] Other (comments) Sore mouth  Captopril Cough  Carvedilol Diarrhea [address]  Morphine Nausea and Vomiting and Swelling  Penicillins Hives Did not tolerate cefepime 3/2020  Pravachol [Pravastatin] Nausea Only  Seafood [Shellfish Containing Products] Hives  Singulair [Montelukast] Other (comments)  
  palpitations Current Outpatient Medications Medication Sig Dispense Refill  bumetanide (BUMEX) 1 mg tablet Take 2 Tabs by mouth daily. 60 Tab 3  
 insulin glargine (Lantus U-100 Insulin) 100 unit/mL injection Inject 14 units daily 10 mL 5  
 metoprolol succinate (TOPROL-XL) 100 mg tablet Take 2 Tabs by mouth daily. 60 Tab 9  
 doxycycline (ADOXA) 100 mg tablet Take 1 Tab by mouth two (2) times a day for 7 days. 14 Tab 0  
 dilTIAZem CD (CARDIZEM CD) 180 mg ER capsule Take 1 Cap by mouth daily.  30 Cap 4  
  hydrALAZINE (APRESOLINE) 50 mg tablet Take 1 Tab by mouth three (3) times daily. 90 Tab 4  warfarin (COUMADIN) 4 mg tablet Take 2 mg by mouth five (5) days a week. Take 1 tablet (4 mg) on Sun and Wed and a half tablet (2 mg) the other 5 days.  acetaminophen 500 mg tab 500 mg, diphenhydrAMINE 25 mg cap 25 mg Take 2 Doses by mouth nightly.  sertraline (ZOLOFT) 50 mg tablet TAKE ONE AND ONE-HALF (1 & 1/2) TABLET BY MOUTH DAILY 45 Tab 5  
 doxazosin (CARDURA) 4 mg tablet TAKE ONE TABLET BY MOUTH ONCE NIGHTLY 30 Tab 10  
 busPIRone (BUSPAR) 10 mg tablet TAKE ONE TABLET BY MOUTH TWICE A DAY 60 Tab 10  
 metolazone (ZAROXOLYN) 5 mg tablet Take 1 Tab by mouth daily as needed (take if develop increased LE edema, weight gain > 5 pounds. ). 30 Tab 1  cholecalciferol, VITAMIN D3, (VITAMIN D3) 5,000 unit tab tablet Take 5,000 Units by mouth daily.  vitamin A 8,000 unit capsule Take 8,000 Units by mouth daily.  insulin lispro (HUMALOG) 100 unit/mL kwikpen 2 units with dinner for sugars over 200 1 Package 0  
 cyanocobalamin (VITAMIN B-12) 1,000 mcg sublingual tablet Take 1,000 mcg by mouth daily. ROS Visit Vitals /74 (BP 1 Location: Left arm, BP Patient Position: Sitting) Pulse 79 Temp 97.6 °F (36.4 °C) (Oral) Resp 12 Ht 5' 9\" (1.753 m) Wt 251 lb (113.9 kg) SpO2 98% BMI 37.07 kg/m² Physical Exam 
Vitals signs and nursing note reviewed. Constitutional:   
   Appearance: Normal appearance. HENT:  
   Head: Normocephalic and atraumatic. Mouth/Throat:  
   Mouth: Mucous membranes are moist.  
Eyes:  
   Conjunctiva/sclera: Conjunctivae normal.  
Neck: Musculoskeletal: Neck supple. Cardiovascular:  
   Rate and Rhythm: Normal rate. Rhythm regularly irregular. Heart sounds: Normal heart sounds. Heart sounds not distant. No murmur. No gallop.    
Pulmonary:  
   Effort: Pulmonary effort is normal.  
 Breath sounds: Normal breath sounds and air entry. No wheezing, rhonchi or rales. Musculoskeletal:  
   Right lower le+ Pitting Edema present. Left lower le+ Pitting Edema present. Lymphadenopathy:  
   Cervical: No cervical adenopathy. Skin: 
   General: Skin is warm and dry. Findings: No erythema or rash. Neurological:  
   Mental Status: She is alert and oriented to person, place, and time. Psychiatric:     
   Mood and Affect: Mood normal.     
   Behavior: Behavior normal. Behavior is cooperative. Results for orders placed or performed in visit on 20 AMB POC PT/INR Result Value Ref Range VALID INTERNAL CONTROL POC Yes Prothrombin time (POC) 18.4 seconds INR POC 1.5 ASSESSMENT and PLAN 
  ICD-10-CM ICD-9-CM 1. Type 2 diabetes mellitus with left diabetic foot infection ([facility]) E11.628 250.80 insulin glargine (Lantus U-100 Insulin) 100 unit/mL injection L08.9 686.9 2. Traumatic hematoma of foot, left, initial encounter S90.32XA 924.20 3. Left leg cellulitis L03.116 682.6 4. Diabetic ulcer of left heel associated with type 2 diabetes mellitus, with fat layer exposed ([facility] E11.621 250.80 L97.422 707.14   
5. Diabetic ulcer of left midfoot associated with type 2 diabetes mellitus, with necrosis of muscle ([facility]) E11.621 250.80 L97.423 707.14   
6. PAF (paroxysmal atrial fibrillation) ([facility]) I48.0 427.31 AMB POC PT/INR  
   metoprolol succinate (TOPROL-XL) 100 mg tablet  
   warfarin (COUMADIN) 4 mg tablet PROTHROMBIN TIME + INR 7. CKD (chronic kidney disease), stage IV ([facility] P65.7 132.7 METABOLIC PANEL, BASIC Diagnoses and all orders for this visit: 
 
1. Type 2 diabetes mellitus with left diabetic foot infection ([facility] Blood sugars are good on current insulin. -     insulin glargine (Lantus U-100 Insulin) 100 unit/mL injection; Inject 14 units daily 2. Traumatic hematoma of foot, left, initial encounter Resolved. 3. Left leg cellulitis Resolved. 4. Diabetic ulcer of left heel associated with type 2 diabetes mellitus, with fat layer exposed ([facility] Continue speciality care. 5. Diabetic ulcer of left midfoot associated with type 2 diabetes mellitus, with necrosis of muscle ([facility] Continue speciality care. 6. PAF (paroxysmal atrial fibrillation) ([facility] Rate controlled. -     AMB POC PT/INR 
-     metoprolol succinate (TOPROL-XL) 100 mg tablet; Take 2 Tabs by mouth daily. -     warfarin (COUMADIN) 4 mg tablet; Take 1 tablet on Tues, Thurs and Sat and a half tablet the other 4 days. 
-     PROTHROMBIN TIME + INR; Future 7. CKD (chronic kidney disease), stage IV ([facility] -     METABOLIC PANEL, BASIC; Future -     bumetanide (BUMEX) 1 mg tablet; Take 2 Tabs by mouth daily. Follow-up and Dispositions · Return in about 3 months (around 6/26/2020) for DM, HTN, CKD, A fib . 
  
 
lab results and schedule of future lab studies reviewed with patient I have discussed the diagnosis, evaluation and treatment options and the intended plan with the patient. Patient understands and is in agreement. The patient has received an after-visit summary and questions were answered concerning future plans. I have discussed side effects and warnings of any new medications with the patient as well.

## 2020-03-26 NOTE — PROGRESS NOTES
Wesley Jackson  Identified pt with two pt identifiers(name and ). Chief Complaint Patient presents with  
[facility] Follow Up Reviewed record In preparation for visit and have obtained necessary documentation. Advanced directive / living will on file. 1. Have you been to the ER, urgent care clinic or hospitalized since your last visit? [facility] 3/8/2020 
 
2. Have you seen or consulted any other health care providers outside of the [address] [place] [address] since your last visit? Include any pap smears or colon screening. Wound center Vitals reviewed with provider. Health Maintenance reviewed:  
 
Health Maintenance Due Topic  Breast Cancer Screen Mammogram   
 PAP AKA CERVICAL CYTOLOGY  Eye Exam Retinal or Dilated  Colonoscopy Wt Readings from Last 3 Encounters:  
20 251 lb (113.9 kg) 20 238 lb 8.6 oz (108.2 kg) 20 246 lb (111.6 kg) Temp Readings from Last 3 Encounters:  
20 97.6 °F (36.4 °C) (Oral) 20 97.7 °F (36.5 °C)  
20 97.6 °F (36.4 °C) BP Readings from Last 3 Encounters:  
20 157/76  
20 125/55  
20 146/65 Pulse Readings from Last 3 Encounters:  
20 79  
20 82  
20 60 Vitals:  
 20 1450 BP: 157/76 Pulse: 79 Resp: 12 Temp: 97.6 °F (36.4 °C) TempSrc: Oral  
SpO2: 98% Weight: 251 lb (113.9 kg) Height: 5' 9\" (1.753 m) PainSc:   5 PainLoc: Foot Learning Assessment:  
:  
 
 
Learning Assessment 3/25/2014 PRIMARY LEARNER Patient HIGHEST LEVEL OF EDUCATION - PRIMARY LEARNER  2 YEARS OF COLLEGE  
BARRIERS PRIMARY LEARNER NONE  
CO-LEARNER CAREGIVER No  
PRIMARY LANGUAGE ENGLISH  NEED No  
LEARNER PREFERENCE PRIMARY DEMONSTRATION  
LEARNING SPECIAL TOPICS Does does a pacemaker any noise ANSWERED BY patient RELATIONSHIP SELF Depression Screening:  
:  
 
 
3 most recent PHQ Screens 2020 Little interest or pleasure in doing things Not at all Feeling down, depressed, irritable, or hopeless Not at all Total Score PHQ 2 0 Fall Risk Assessment:  
:  
 
 
Fall Risk Assessment, last 12 mths 8/15/2019 Able to walk? Yes Fall in past 12 months? No  
  
 
Abuse Screening:  
:  
 
 
Abuse Screening Questionnaire 8/15/2019 Do you ever feel afraid of your partner? Deidrejennifferpatrick [place]az Are you in a relationship with someone who physically or mentally threatens you? Sunshine Brain Is it safe for you to go home? Y  
  
 
ADL Screening:  
:  
 
 

## 2020-03-26 NOTE — PATIENT INSTRUCTIONS
Call Dr Jose Amado and arrange nephrology follow up for 5 weeks from now. We need a Protime/INR and a metabolic panel, to check potassium, in 2 weeks.

## 2020-04-08 ENCOUNTER — PATIENT MESSAGE (OUTPATIENT)
Dept: INTERNAL MEDICINE CLINIC | Facility: CLINIC | Age: 60
End: 2020-04-08

## 2020-04-08 DIAGNOSIS — R60.0 BILATERAL LEG EDEMA: Primary | ICD-10-CM

## 2020-04-08 NOTE — TELEPHONE ENCOUNTER
From: Graham Lund  To: Brien Ervin MD  Sent: 4/8/2020 2:37 PM EDT  Subject: Prescription Question    My feet and ankles are swelling more than usual. I suspect it's the diltazium ( sorry spelling) it was on my allergy list at one time. It was stopped when I took it last time any suggestions?

## 2020-04-10 DIAGNOSIS — I48.0 PAF (PAROXYSMAL ATRIAL FIBRILLATION) (HCC): ICD-10-CM

## 2020-04-10 LAB
INR PPP: 3.1 (ref 0.8–1.2)
POTASSIUM SERPL-SCNC: 3.9 MMOL/L (ref 3.5–5.2)
PROTHROMBIN TIME: 31.4 SEC (ref 9.1–12)

## 2020-04-10 RX ORDER — WARFARIN 4 MG/1
TABLET ORAL
Qty: 60 TAB | Refills: 5
Start: 2020-04-10 | End: 2020-01-01

## 2020-04-10 NOTE — PROGRESS NOTES
Inform patient INR is too high (probably because no longer taking antibiotic). Confirm current dose of warfarin 4 mg tablets 1 tablet on Tues, Thurs and Sat and a half tablet the other 4 days. . Change warfarin 4 mg tablets to 1 tablet on Tues and Sat and a half tablet the other 5 days. .  Repeat INR in  3 weeks. Will need to mail lab slip Message also sent in My Chart.

## 2020-04-13 RX ORDER — METOLAZONE 2.5 MG/1
TABLET ORAL
Qty: 12 TAB | Refills: 1 | Status: SHIPPED | OUTPATIENT
Start: 2020-04-13 | End: 2020-01-01

## 2020-04-17 ENCOUNTER — DOCUMENTATION ONLY (OUTPATIENT)
Dept: INTERNAL MEDICINE CLINIC | Facility: CLINIC | Age: 60
End: 2020-04-17

## 2020-04-17 NOTE — PROGRESS NOTES
Patient calling stating that her leg was red and swollen, looks like cellulitis and she is now running a fever and having chills. Spoke with Dr. Jes Silva and she advised for patient to go to the emergency room. Advised patient and she understood.

## 2020-04-19 ENCOUNTER — HOSPITAL ENCOUNTER (INPATIENT)
Age: 60
LOS: 17 days | Discharge: NURSING, REHAB OR LONG-TERM CARE | DRG: 854 | End: 2020-05-07
Attending: EMERGENCY MEDICINE | Admitting: INTERNAL MEDICINE
Payer: COMMERCIAL

## 2020-04-19 ENCOUNTER — APPOINTMENT (OUTPATIENT)
Dept: GENERAL RADIOLOGY | Age: 60
DRG: 854 | End: 2020-04-19
Attending: EMERGENCY MEDICINE
Payer: COMMERCIAL

## 2020-04-19 DIAGNOSIS — M86.9 OSTEOMYELITIS OF LEFT FOOT, UNSPECIFIED TYPE (HCC): ICD-10-CM

## 2020-04-19 DIAGNOSIS — L08.9 TYPE 2 DIABETES MELLITUS WITH LEFT DIABETIC FOOT INFECTION (HCC): ICD-10-CM

## 2020-04-19 DIAGNOSIS — T82.9XXD DISORDER OF CARDIAC PACEMAKER SYSTEM, SUBSEQUENT ENCOUNTER: ICD-10-CM

## 2020-04-19 DIAGNOSIS — E11.628 TYPE 2 DIABETES MELLITUS WITH LEFT DIABETIC FOOT INFECTION (HCC): ICD-10-CM

## 2020-04-19 DIAGNOSIS — L03.116 CELLULITIS OF LEFT LOWER EXTREMITY: ICD-10-CM

## 2020-04-19 DIAGNOSIS — L97.529 ULCER OF LEFT FOOT, UNSPECIFIED ULCER STAGE (HCC): Primary | ICD-10-CM

## 2020-04-19 LAB
ALBUMIN SERPL-MCNC: 2.5 G/DL (ref 3.5–5)
ALBUMIN/GLOB SERPL: 0.6 {RATIO} (ref 1.1–2.2)
ALP SERPL-CCNC: 75 U/L (ref 45–117)
ALT SERPL-CCNC: 9 U/L (ref 12–78)
ANION GAP SERPL CALC-SCNC: 14 MMOL/L (ref 5–15)
AST SERPL-CCNC: 12 U/L (ref 15–37)
BASOPHILS # BLD: 0 K/UL (ref 0–0.1)
BASOPHILS NFR BLD: 0 % (ref 0–1)
BILIRUB SERPL-MCNC: 0.4 MG/DL (ref 0.2–1)
BUN SERPL-MCNC: 37 MG/DL (ref 6–20)
BUN/CREAT SERPL: 19 (ref 12–20)
CALCIUM SERPL-MCNC: 8.8 MG/DL (ref 8.5–10.1)
CHLORIDE SERPL-SCNC: 105 MMOL/L (ref 97–108)
CO2 SERPL-SCNC: 15 MMOL/L (ref 21–32)
COMMENT, HOLDF: NORMAL
CREAT SERPL-MCNC: 1.95 MG/DL (ref 0.55–1.02)
DIFFERENTIAL METHOD BLD: ABNORMAL
EOSINOPHIL # BLD: 0 K/UL (ref 0–0.4)
EOSINOPHIL NFR BLD: 0 % (ref 0–7)
ERYTHROCYTE [DISTWIDTH] IN BLOOD BY AUTOMATED COUNT: 15.7 % (ref 11.5–14.5)
ERYTHROCYTE [SEDIMENTATION RATE] IN BLOOD: 72 MM/HR (ref 0–30)
GLOBULIN SER CALC-MCNC: 4.5 G/DL (ref 2–4)
GLUCOSE SERPL-MCNC: 218 MG/DL (ref 65–100)
HCT VFR BLD AUTO: 28.4 % (ref 35–47)
HGB BLD-MCNC: 8.8 G/DL (ref 11.5–16)
IMM GRANULOCYTES # BLD AUTO: 0.1 K/UL (ref 0–0.04)
IMM GRANULOCYTES NFR BLD AUTO: 1 % (ref 0–0.5)
INR PPP: 2.1 (ref 0.9–1.1)
LACTATE SERPL-SCNC: 1 MMOL/L (ref 0.4–2)
LYMPHOCYTES # BLD: 0.7 K/UL (ref 0.8–3.5)
LYMPHOCYTES NFR BLD: 5 % (ref 12–49)
MCH RBC QN AUTO: 28.9 PG (ref 26–34)
MCHC RBC AUTO-ENTMCNC: 31 G/DL (ref 30–36.5)
MCV RBC AUTO: 93.4 FL (ref 80–99)
MONOCYTES # BLD: 0.7 K/UL (ref 0–1)
MONOCYTES NFR BLD: 5 % (ref 5–13)
NEUTS SEG # BLD: 12.9 K/UL (ref 1.8–8)
NEUTS SEG NFR BLD: 89 % (ref 32–75)
NRBC # BLD: 0 K/UL (ref 0–0.01)
NRBC BLD-RTO: 0 PER 100 WBC
PLATELET # BLD AUTO: 336 K/UL (ref 150–400)
PMV BLD AUTO: 10.1 FL (ref 8.9–12.9)
POTASSIUM SERPL-SCNC: 3.5 MMOL/L (ref 3.5–5.1)
PROT SERPL-MCNC: 7 G/DL (ref 6.4–8.2)
PROTHROMBIN TIME: 20.3 SEC (ref 9–11.1)
RBC # BLD AUTO: 3.04 M/UL (ref 3.8–5.2)
RBC MORPH BLD: ABNORMAL
RBC MORPH BLD: ABNORMAL
SAMPLES BEING HELD,HOLD: NORMAL
SODIUM SERPL-SCNC: 134 MMOL/L (ref 136–145)
TROPONIN I SERPL-MCNC: <0.05 NG/ML
WBC # BLD AUTO: 14.4 K/UL (ref 3.6–11)

## 2020-04-19 PROCEDURE — 71045 X-RAY EXAM CHEST 1 VIEW: CPT

## 2020-04-19 PROCEDURE — 85652 RBC SED RATE AUTOMATED: CPT

## 2020-04-19 PROCEDURE — 80053 COMPREHEN METABOLIC PANEL: CPT

## 2020-04-19 PROCEDURE — 84484 ASSAY OF TROPONIN QUANT: CPT

## 2020-04-19 PROCEDURE — 87186 SC STD MICRODIL/AGAR DIL: CPT

## 2020-04-19 PROCEDURE — 73590 X-RAY EXAM OF LOWER LEG: CPT

## 2020-04-19 PROCEDURE — 85025 COMPLETE CBC W/AUTO DIFF WBC: CPT

## 2020-04-19 PROCEDURE — 87077 CULTURE AEROBIC IDENTIFY: CPT

## 2020-04-19 PROCEDURE — 87040 BLOOD CULTURE FOR BACTERIA: CPT

## 2020-04-19 PROCEDURE — 99284 EMERGENCY DEPT VISIT MOD MDM: CPT

## 2020-04-19 PROCEDURE — 86140 C-REACTIVE PROTEIN: CPT

## 2020-04-19 PROCEDURE — 73630 X-RAY EXAM OF FOOT: CPT

## 2020-04-19 PROCEDURE — 96375 TX/PRO/DX INJ NEW DRUG ADDON: CPT

## 2020-04-19 PROCEDURE — 85610 PROTHROMBIN TIME: CPT

## 2020-04-19 PROCEDURE — 93005 ELECTROCARDIOGRAM TRACING: CPT

## 2020-04-19 PROCEDURE — 87147 CULTURE TYPE IMMUNOLOGIC: CPT

## 2020-04-19 PROCEDURE — 74011250636 HC RX REV CODE- 250/636: Performed by: EMERGENCY MEDICINE

## 2020-04-19 PROCEDURE — 36415 COLL VENOUS BLD VENIPUNCTURE: CPT

## 2020-04-19 PROCEDURE — 96361 HYDRATE IV INFUSION ADD-ON: CPT

## 2020-04-19 PROCEDURE — 83605 ASSAY OF LACTIC ACID: CPT

## 2020-04-19 RX ORDER — FENTANYL CITRATE 50 UG/ML
100 INJECTION, SOLUTION INTRAMUSCULAR; INTRAVENOUS
Status: COMPLETED | OUTPATIENT
Start: 2020-04-19 | End: 2020-04-19

## 2020-04-19 RX ORDER — ONDANSETRON 2 MG/ML
4 INJECTION INTRAMUSCULAR; INTRAVENOUS
Status: COMPLETED | OUTPATIENT
Start: 2020-04-19 | End: 2020-04-19

## 2020-04-19 RX ORDER — VANCOMYCIN 2 GRAM/500 ML IN 0.9 % SODIUM CHLORIDE INTRAVENOUS
2000
Status: COMPLETED | OUTPATIENT
Start: 2020-04-19 | End: 2020-04-20

## 2020-04-19 RX ORDER — SODIUM CHLORIDE 0.9 % (FLUSH) 0.9 %
5-10 SYRINGE (ML) INJECTION AS NEEDED
Status: DISCONTINUED | OUTPATIENT
Start: 2020-04-19 | End: 2020-01-01 | Stop reason: HOSPADM

## 2020-04-19 RX ADMIN — FENTANYL CITRATE 100 MCG: 50 INJECTION INTRAMUSCULAR; INTRAVENOUS at 22:00

## 2020-04-19 RX ADMIN — SODIUM CHLORIDE 500 ML: 900 INJECTION, SOLUTION INTRAVENOUS at 21:55

## 2020-04-19 RX ADMIN — ONDANSETRON 4 MG: 2 INJECTION INTRAMUSCULAR; INTRAVENOUS at 21:58

## 2020-04-19 NOTE — Clinical Note
TRANSFER - OUT REPORT:     Verbal report given to: ICU,RN. Report consisted of patient's Situation, Background, Assessment and   Recommendations(SBAR). Opportunity for questions and clarification was provided. Patient transported with a Registered Nurse. Patient transported to: 2550.

## 2020-04-19 NOTE — Clinical Note
TRANSFER - OUT REPORT:     Verbal report given to: GURINDER Boyle. Report consisted of patient's Situation, Background, Assessment and   Recommendations(SBAR). Opportunity for questions and clarification was provided. Patient transported with a Registered Nurse. Patient transported to: 2157.

## 2020-04-20 PROBLEM — A41.9 SEPSIS (HCC): Status: ACTIVE | Noted: 2020-04-20

## 2020-04-20 LAB
ATRIAL RATE: 60 BPM
CALCULATED R AXIS, ECG10: -27 DEGREES
CALCULATED T AXIS, ECG11: 62 DEGREES
CRP SERPL-MCNC: 12.2 MG/DL (ref 0–0.6)
CRP SERPL-MCNC: 14.4 MG/DL (ref 0–0.6)
DIAGNOSIS, 93000: NORMAL
ERYTHROCYTE [SEDIMENTATION RATE] IN BLOOD: 85 MM/HR (ref 0–30)
GLUCOSE BLD STRIP.AUTO-MCNC: 142 MG/DL (ref 65–100)
GLUCOSE BLD STRIP.AUTO-MCNC: 149 MG/DL (ref 65–100)
GLUCOSE BLD STRIP.AUTO-MCNC: 155 MG/DL (ref 65–100)
GLUCOSE BLD STRIP.AUTO-MCNC: 198 MG/DL (ref 65–100)
INR PPP: 1.9 (ref 0.9–1.1)
P-R INTERVAL, ECG05: 200 MS
PROTHROMBIN TIME: 19.3 SEC (ref 9–11.1)
Q-T INTERVAL, ECG07: 530 MS
QRS DURATION, ECG06: 114 MS
QTC CALCULATION (BEZET), ECG08: 530 MS
SERVICE CMNT-IMP: ABNORMAL
VENTRICULAR RATE, ECG03: 60 BPM

## 2020-04-20 PROCEDURE — 36415 COLL VENOUS BLD VENIPUNCTURE: CPT

## 2020-04-20 PROCEDURE — 74011250636 HC RX REV CODE- 250/636: Performed by: NURSE PRACTITIONER

## 2020-04-20 PROCEDURE — 87147 CULTURE TYPE IMMUNOLOGIC: CPT

## 2020-04-20 PROCEDURE — 74011250636 HC RX REV CODE- 250/636: Performed by: INTERNAL MEDICINE

## 2020-04-20 PROCEDURE — 85652 RBC SED RATE AUTOMATED: CPT

## 2020-04-20 PROCEDURE — 74011000258 HC RX REV CODE- 258: Performed by: INTERNAL MEDICINE

## 2020-04-20 PROCEDURE — 65660000000 HC RM CCU STEPDOWN

## 2020-04-20 PROCEDURE — 85610 PROTHROMBIN TIME: CPT

## 2020-04-20 PROCEDURE — 74011000258 HC RX REV CODE- 258: Performed by: EMERGENCY MEDICINE

## 2020-04-20 PROCEDURE — 96375 TX/PRO/DX INJ NEW DRUG ADDON: CPT

## 2020-04-20 PROCEDURE — 86140 C-REACTIVE PROTEIN: CPT

## 2020-04-20 PROCEDURE — 74011250637 HC RX REV CODE- 250/637: Performed by: EMERGENCY MEDICINE

## 2020-04-20 PROCEDURE — 74011250636 HC RX REV CODE- 250/636: Performed by: EMERGENCY MEDICINE

## 2020-04-20 PROCEDURE — 87070 CULTURE OTHR SPECIMN AEROBIC: CPT

## 2020-04-20 PROCEDURE — 96365 THER/PROPH/DIAG IV INF INIT: CPT

## 2020-04-20 PROCEDURE — 87077 CULTURE AEROBIC IDENTIFY: CPT

## 2020-04-20 PROCEDURE — 87186 SC STD MICRODIL/AGAR DIL: CPT

## 2020-04-20 PROCEDURE — 74011250637 HC RX REV CODE- 250/637: Performed by: INTERNAL MEDICINE

## 2020-04-20 PROCEDURE — 82962 GLUCOSE BLOOD TEST: CPT

## 2020-04-20 RX ORDER — WARFARIN 2 MG/1
4 TABLET ORAL ONCE
Status: COMPLETED | OUTPATIENT
Start: 2020-04-20 | End: 2020-04-20

## 2020-04-20 RX ORDER — INSULIN LISPRO 100 [IU]/ML
INJECTION, SOLUTION INTRAVENOUS; SUBCUTANEOUS
Status: DISCONTINUED | OUTPATIENT
Start: 2020-04-20 | End: 2020-01-01 | Stop reason: HOSPADM

## 2020-04-20 RX ORDER — ACETAMINOPHEN 325 MG/1
650 TABLET ORAL
Status: DISCONTINUED | OUTPATIENT
Start: 2020-04-20 | End: 2020-01-01

## 2020-04-20 RX ORDER — VANCOMYCIN 1.75 GRAM/500 ML IN 0.9 % SODIUM CHLORIDE INTRAVENOUS
1750 EVERY 24 HOURS
Status: DISCONTINUED | OUTPATIENT
Start: 2020-04-20 | End: 2020-04-20

## 2020-04-20 RX ORDER — VANCOMYCIN HYDROCHLORIDE
1250 EVERY 24 HOURS
Status: DISCONTINUED | OUTPATIENT
Start: 2020-04-20 | End: 2020-04-21

## 2020-04-20 RX ORDER — DEXTROSE 50 % IN WATER (D50W) INTRAVENOUS SYRINGE
25-50 AS NEEDED
Status: DISCONTINUED | OUTPATIENT
Start: 2020-04-20 | End: 2020-01-01 | Stop reason: HOSPADM

## 2020-04-20 RX ORDER — LEVOFLOXACIN 5 MG/ML
750 INJECTION, SOLUTION INTRAVENOUS
Status: DISCONTINUED | OUTPATIENT
Start: 2020-04-20 | End: 2020-01-01

## 2020-04-20 RX ORDER — MAGNESIUM SULFATE 100 %
4 CRYSTALS MISCELLANEOUS AS NEEDED
Status: DISCONTINUED | OUTPATIENT
Start: 2020-04-20 | End: 2020-01-01 | Stop reason: HOSPADM

## 2020-04-20 RX ORDER — METRONIDAZOLE 500 MG/100ML
500 INJECTION, SOLUTION INTRAVENOUS EVERY 12 HOURS
Status: DISCONTINUED | OUTPATIENT
Start: 2020-04-20 | End: 2020-01-01

## 2020-04-20 RX ADMIN — WARFARIN SODIUM 4 MG: 2 TABLET ORAL at 17:37

## 2020-04-20 RX ADMIN — CEFEPIME HYDROCHLORIDE 1 G: 1 INJECTION, POWDER, FOR SOLUTION INTRAMUSCULAR; INTRAVENOUS at 00:32

## 2020-04-20 RX ADMIN — METRONIDAZOLE 500 MG: 500 INJECTION, SOLUTION INTRAVENOUS at 17:37

## 2020-04-20 RX ADMIN — VANCOMYCIN HYDROCHLORIDE 1250 MG: 10 INJECTION, POWDER, LYOPHILIZED, FOR SOLUTION INTRAVENOUS at 22:46

## 2020-04-20 RX ADMIN — LEVOFLOXACIN 750 MG: 5 INJECTION, SOLUTION INTRAVENOUS at 20:26

## 2020-04-20 RX ADMIN — CEFEPIME HYDROCHLORIDE 1 G: 1 INJECTION, POWDER, FOR SOLUTION INTRAMUSCULAR; INTRAVENOUS at 08:03

## 2020-04-20 RX ADMIN — VANCOMYCIN HYDROCHLORIDE 2000 MG: 10 INJECTION, POWDER, LYOPHILIZED, FOR SOLUTION INTRAVENOUS at 00:40

## 2020-04-20 RX ADMIN — ACETAMINOPHEN 650 MG: 325 TABLET, FILM COATED ORAL at 15:05

## 2020-04-20 NOTE — H&P
Hospitalist Admission Note NAME: Josie Grissom :  1960 MRN:  [id number] Date/Time:  2020 2:22 AM 
 
Patient PCP: Shila Lobo MD 
______________________________________________________________________ Given the patient's current clinical presentation, I have a high level of concern for decompensation if discharged from the emergency department. Complex decision making was performed, which includes reviewing the patient's available past medical records, laboratory results, and x-ray films. My assessment of this patient's clinical condition and my plan of care is as follows. Assessment / Plan: 
Sepsis secondary to left foot cellulitis and osteomyelitis  Admit patient to stepdown  Start patient on broad-spectrum antibiotic vancomycin and cefepime  Podiatry consultation  Follow-up blood culture Atrial fibrillation  Continue Coumadin  Continue Cardizem Hypertensioncontinue home antihypertensive medication Chronic kidney disease stage IV Renal function close to baseline DM  Start sliding scale  Lantus 10 units daily Anemia likely secondary to chronic kidney disease  Monitor and transfuse PRN Code Status: Full Surrogate Decision Maker: DVT Prophylaxis: coumadin GI Prophylaxis: not indicated Subjective: CHIEF COMPLAINT: fever HISTORY OF PRESENT ILLNESS:    
60years old female from home with past medical history significant for hypertension, DM, atrial fibrillation on Coumadin, chronic lower leg wound, history of cellulitis, hyperlipidemia presented to the hospital for evaluation of left leg pain associated with fever, patient was recently admitted to the hospital for treatment of cellulitis and was discharged on 2020 and patient stated she completed course of antibiotic a week ago, patient had left foot ulcer debridement 2020, work-up in ED was significant for elevated white blood cell count 14.4, left foot x-ray done and show soft tissue at the level of the third through fifth proximal meta tarsal underlying cortical irregularity and lucency within the proximal third through fifth metatarsal may represent osteomyelitis. We were asked to admit for work up and evaluation of the above problems. Past Medical History:  
Diagnosis Date  Acquired lymphedema Right arm  Arrhythmia  Asthma  Atrial fibrillation ([facility] Dr. Chico Gunderson and Dr. Danielle alves [facility])  Cancer ([facility] bilateral breast  
 Chronic kidney disease  Diabetes mellitus type II   
 Dizziness  Essential hypertension  Hyperlipidemia  Hypertension  Long term (current) use of anticoagulants  Morbid obesity ([facility] 3/14/2012  Nausea & vomiting  Neuropathy  Osteoarthritis  Pacemaker  Sick sinus syndrome ([facility] Past Surgical History:  
Procedure Laterality Date  ABDOMEN SURGERY PROC UNLISTED    
 gastric bypass  GASTRIC BYPASS,OBESE<150CM SAW-EN-Y  7/08  
 hutcher  HX AFIB ABLATION    
 HX BREAST RECONSTRUCTION Bilateral   
 HX CARPAL TUNNEL RELEASE [address] [id number] [address] RIGHT MODIFIED RADICAL   
 HX MASTECTOMY Left   
 no lymphnode removal  
 HX MOHS PROCEDURES  1995  
 right  HX ORTHOPAEDIC Right   
 knee surgery  HX PACEMAKER    
 HX PACEMAKER    
 IR INSERT TUNL CVC W PORT OVER 5 YEARS    
 LAMINECTOMY,CERVICAL  1994  
 NEEDLE BIOPSY LIVER,W OTHR [address]  8.21.07  
 OK Shan 9462 AND 1994  OK TRABECULOPLASTY BY LASER SURGERY    
 US GUIDE ASP OVARIAN CYST ABD APPROACH  8.21.07  
 RIGHT Social History Tobacco Use  Smoking status: Never Smoker  Smokeless tobacco: Never Used Substance Use Topics  Alcohol use: Yes Comment: rare Family History Problem Relation Age of Onset  Cancer Mother  Heart Disease Mother  Alcohol abuse Father  Diabetes Brother  Hypertension Brother Allergies Allergen Reactions  Ace Inhibitors Cough  Amlodipine Swelling  Avapro [Irbesartan] Unable to Obtain  Bactrim [Sulfamethoxazole-Trimethoprim] Other (comments) Sore mouth  Captopril Cough  Carvedilol Diarrhea [address]  Morphine Nausea and Vomiting and Swelling  Penicillins Hives Did not tolerate cefepime 3/2020  Pravachol [Pravastatin] Nausea Only  Seafood [Shellfish Containing Products] Hives  Singulair [Montelukast] Other (comments)  
  palpitations Prior to Admission medications Medication Sig Start Date End Date Taking? Authorizing Provider  
metOLazone (ZAROXOLYN) 2.5 mg tablet Take 1 tablet po on Mon and Thurs for worsening leg swelling. 4/13/20   Vic Ornelas MD  
warfarin (COUMADIN) 4 mg tablet Take 1 tablet on Tues and Sat and a half tablet the other 5 days. 4/10/20   Vic Ornelas MD  
bumetanide (BUMEX) 1 mg tablet Take 2 Tabs by mouth daily. 3/26/20   Vic Ornelas MD  
insulin glargine (Lantus U-100 Insulin) 100 unit/mL injection Inject 14 units daily 3/26/20   Vic Ornelas MD  
metoprolol succinate (TOPROL-XL) 100 mg tablet Take 2 Tabs by mouth daily. 3/26/20   Vic Ornelas MD  
dilTIAZem CD (CARDIZEM CD) 180 mg ER capsule Take 1 Cap by mouth daily. 3/22/20   Sridevi Lagunas MD  
hydrALAZINE (APRESOLINE) 50 mg tablet Take 1 Tab by mouth three (3) times daily. 3/22/20   Sridevi Lagunas MD  
acetaminophen 500 mg tab 500 mg, diphenhydrAMINE 25 mg cap 25 mg Take 2 Doses by mouth nightly.     Provider, Historical  
sertraline (ZOLOFT) 50 mg tablet TAKE ONE AND ONE-HALF (1 & 1/2) TABLET BY MOUTH DAILY 8/22/19   Vic Ornelas MD  
doxazosin (CARDURA) 4 mg tablet TAKE ONE TABLET BY MOUTH ONCE NIGHTLY 6/14/19   Vic Ornelas MD  
busPIRone (BUSPAR) 10 mg tablet TAKE ONE TABLET BY MOUTH TWICE A DAY 5/24/19   Mick Tineo MD  
cholecalciferol, VITAMIN D3, (VITAMIN D3) 5,000 unit tab tablet Take 5,000 Units by mouth daily. Provider, Historical  
vitamin A 8,000 unit capsule Take 8,000 Units by mouth daily. Provider, Historical  
insulin lispro (HUMALOG) 100 unit/mL kwikpen 2 units with dinner for sugars over 200 1/3/14   Mick Tineo MD  
cyanocobalamin (VITAMIN B-12) 1,000 mcg sublingual tablet Take 1,000 mcg by mouth daily. Provider, Historical  
 
 
REVIEW OF SYSTEMS:    
I am not able to complete the review of systems because: The patient is intubated and sedated The patient has altered mental status due to his acute medical problems The patient has baseline aphasia from prior stroke(s) The patient has baseline dementia and is not reliable historian The patient is in acute medical distress and unable to provide information Total of 12 systems reviewed as follows:   
   POSITIVE= underlined text  Negative = text not underlined General:  fever, chills, sweats, generalized weakness, weight loss/gain,  
   loss of appetite Eyes:    blurred vision, eye pain, loss of vision, double vision ENT:    rhinorrhea, pharyngitis Respiratory:   cough, sputum production, SOB, CHOW, wheezing, pleuritic pain  
Cardiology:   chest pain, palpitations, orthopnea, PND, edema, syncope Gastrointestinal:  abdominal pain , N/V, diarrhea, dysphagia, constipation, bleeding Genitourinary:  frequency, urgency, dysuria, hematuria, incontinence Muskuloskeletal :  arthralgia, myalgia, back pain Hematology:  easy bruising, nose or gum bleeding, lymphadenopathy Dermatological: rash, ulceration, pruritis, color change / jaundice Endocrine:   hot flashes or polydipsia Neurological:  headache, dizziness, confusion, focal weakness, paresthesia, Speech difficulties, memory loss, gait difficulty Psychological: Feelings of anxiety, depression, agitation Objective: VITALS:   
Visit Vitals /48 (BP 1 Location: Left arm) Pulse 60 Temp 98.5 °F (36.9 °C) Resp 18 Ht 5' 10\" (1.778 m) Wt 113.4 kg (250 lb) SpO2 97% BMI 35.87 kg/m² PHYSICAL EXAM: 
 
General:    Alert, cooperative, no distress, appears stated age. HEENT: Atraumatic, anicteric sclerae, pink conjunctivae No oral ulcers, mucosa moist, throat clear, dentition fair Neck:  Supple, symmetrical,  thyroid: non tender Lungs:   Clear to auscultation bilaterally. No Wheezing or Rhonchi. No rales. Chest wall:  No tenderness  No Accessory muscle use. Heart:   Regular  rhythm,  No  murmur   No edema Abdomen:   Soft, non-tender. Not distended. Bowel sounds normal 
Extremities: No cyanosis. No clubbing,   
  Skin turgor normal, Capillary refill normal, Radial dial pulse 2+ ,LF foot cover with dressing dry and intact , Diabetic ulcer left foot Skin:     Not pale. Not Jaundiced  No rashes Psych:  Good insight. Not depressed. Not anxious or agitated. Neurologic: EOMs intact. No facial asymmetry. No aphasia or slurred speech. Symmetrical strength, Sensation grossly intact. Alert and oriented X 4.  
 
_______________________________________________________________________ Care Plan discussed with: 
  Comments Patient y Family RN y   
Care Manager Consultant:     
_______________________________________________________________________ Expected  Disposition:  
Home with Family y HH/PT/OT/RN   
SNF/LTC   
[facility]   
________________________________________________________________________ TOTAL TIME:  60  Minutes Critical Care Provided     Minutes non procedure based Comments  
 y Reviewed previous records  
>50% of visit spent in counseling and coordination of care y Discussion with patient and/or family and questions answered 
  
 
________________________________________________________________________ Signed:  Deb Cervantes MD Procedures: see electronic medical records for all procedures/Xrays and details which were not copied into this note but were reviewed prior to creation of Plan. LAB DATA REVIEWED:   
Recent Results (from the past 24 hour(s)) EKG, 12 LEAD, INITIAL Collection Time: 04/19/20  9:36 PM  
Result Value Ref Range Ventricular Rate 60 BPM  
 Atrial Rate 60 BPM  
 P-R Interval 200 ms QRS Duration 114 ms Q-T Interval 530 ms QTC Calculation (Bezet) 530 ms Calculated R Axis -27 degrees Calculated T Axis 62 degrees Diagnosis Atrial-paced rhythm Incomplete left bundle branch block Nonspecific ST abnormality Prolonged QT When compared with ECG of 08-MAR-2020 09:03, Electronic atrial pacemaker has replaced Atrial fibrillation Vent. rate has decreased  BPM 
Criteria for Septal infarct are no longer present LACTIC ACID Collection Time: 04/19/20  9:53 PM  
Result Value Ref Range Lactic acid 1.0 0.4 - 2.0 MMOL/L  
METABOLIC PANEL, COMPREHENSIVE Collection Time: 04/19/20  9:53 PM  
Result Value Ref Range Sodium 134 (L) 136 - 145 mmol/L Potassium 3.5 3.5 - 5.1 mmol/L Chloride 105 97 - 108 mmol/L  
 CO2 15 (LL) 21 - 32 mmol/L Anion gap 14 5 - 15 mmol/L Glucose 218 (H) 65 - 100 mg/dL BUN 37 (H) 6 - 20 MG/DL Creatinine 1.95 (H) 0.55 - 1.02 MG/DL  
 BUN/Creatinine ratio 19 12 - 20 GFR est AA 32 (L) >60 ml/min/1.73m2 GFR est non-AA 26 (L) >60 ml/min/1.73m2 Calcium 8.8 8.5 - 10.1 MG/DL Bilirubin, total 0.4 0.2 - 1.0 MG/DL  
 ALT (SGPT) 9 (L) 12 - 78 U/L  
 AST (SGOT) 12 (L) 15 - 37 U/L Alk. phosphatase 75 45 - 117 U/L Protein, total 7.0 6.4 - 8.2 g/dL Albumin 2.5 (L) 3.5 - 5.0 g/dL Globulin 4.5 (H) 2.0 - 4.0 g/dL A-G Ratio 0.6 (L) 1.1 - 2.2    
CBC WITH AUTOMATED DIFF Collection Time: 04/19/20  9:53 PM  
Result Value Ref Range WBC 14.4 (H) 3.6 - 11.0 K/uL  
 RBC 3.04 (L) 3.80 - 5.20 M/uL HGB 8.8 (L) 11.5 - 16.0 g/dL  HCT 28.4 (L) 35.0 - 47.0 % MCV 93.4 80.0 - 99.0 FL  
 MCH 28.9 26.0 - 34.0 PG  
 MCHC 31.0 30.0 - 36.5 g/dL  
 RDW 15.7 (H) 11.5 - 14.5 % PLATELET 400 328 - 292 K/uL MPV 10.1 8.9 - 12.9 FL  
 NRBC 0.0 0  WBC ABSOLUTE NRBC 0.00 0.00 - 0.01 K/uL NEUTROPHILS 89 (H) 32 - 75 % LYMPHOCYTES 5 (L) 12 - 49 % MONOCYTES 5 5 - 13 % EOSINOPHILS 0 0 - 7 % BASOPHILS 0 0 - 1 % IMMATURE GRANULOCYTES 1 (H) 0.0 - 0.5 % ABS. NEUTROPHILS 12.9 (H) 1.8 - 8.0 K/UL  
 ABS. LYMPHOCYTES 0.7 (L) 0.8 - 3.5 K/UL  
 ABS. MONOCYTES 0.7 0.0 - 1.0 K/UL  
 ABS. EOSINOPHILS 0.0 0.0 - 0.4 K/UL  
 ABS. BASOPHILS 0.0 0.0 - 0.1 K/UL  
 ABS. IMM. GRANS. 0.1 (H) 0.00 - 0.04 K/UL  
 DF AUTOMATED    
 RBC COMMENTS ANISOCYTOSIS 1+ 
    
 RBC COMMENTS DAKOTAH CELLS 
PRESENT 
    
PROTHROMBIN TIME + INR Collection Time: 04/19/20  9:53 PM  
Result Value Ref Range INR 2.1 (H) 0.9 - 1.1 Prothrombin time 20.3 (H) 9.0 - 11.1 sec TROPONIN I Collection Time: 04/19/20  9:53 PM  
Result Value Ref Range Troponin-I, Qt. <0.05 <0.05 ng/mL SED RATE (ESR) Collection Time: 04/19/20  9:53 PM  
Result Value Ref Range Sed rate, automated 72 (H) 0 - 30 mm/hr SAMPLES BEING HELD Collection Time: 04/19/20 10:05 PM  
Result Value Ref Range SAMPLES BEING HELD BLDCS   
 COMMENT Add-on orders for these samples will be processed based on acceptable specimen integrity and analyte stability, which may vary by analyte.

## 2020-04-20 NOTE — PROGRESS NOTES
Problem: Falls - Risk of 
Goal: *Absence of Falls Description: Document Burrell Canavan Fall Risk and appropriate interventions in the flowsheet. Outcome: Progressing Towards Goal 
Note: Fall Risk Interventions: 
Mobility Interventions: Patient to call before getting OOB, Utilize walker, cane, or other assistive device Medication Interventions: Patient to call before getting OOB, Teach patient to arise slowly

## 2020-04-20 NOTE — PROGRESS NOTES
Pharmacy Automatic Renal Dosing Protocol - Antimicrobials Indication for Antimicrobials: Skin and Soft Tissue Infection Current Regimen of Each Antimicrobial: 
Vancomycin 2 gm X 1 dose, then Vancomycin 1750 mg every 24 hrs(Start Date ; Day # 1) Goal Level: VANCOMYCIN TROUGH GOAL RANGE Vancomycin Trough: 15 - 20 mcg/mL  (AUC: 400 - 600 mg/hr/Liter/day) Date Dose & Interval Measured (mcg/mL) Extrapolated (mcg/mL) Date & time of next level:  
 
Radiology / Imaging results: (X-ray, CT scan or MRI):  
 
Paralysis, amputations, malnutrition:  
Labs: 
Recent Labs 203 CREA 1.95* BUN 37* WBC 14.4* Temp (24hrs), Av.7 °F (36.5 °C), Min:97.4 °F (36.3 °C), Max:98.5 °F (36.9 °C) Creatinine Clearance (mL/min) or Dialysis: 41.9 Impression/Plan:  
Skin and Soft Tissue Infection / Vancomycin 2 gm X 1 dose, then Vancomycin 1750 mg every 24 hrs Antimicrobial stop date 5 days Pharmacy will follow daily and adjust medications as appropriate for renal function and/or serum levels. Thank you, Eric Graham, PHARMD 
 
Recommended duration of therapy 
http://[facility]/Pharmacy/Clinical%20Companion/Duration%[email or web address]. docx Renal Dosing 
http://[email or web address]/[place]/[facility]/Pharmacy/Clinical%20Companion/Renal%[email or web address]. pdf

## 2020-04-20 NOTE — ED NOTES
Jamar w/ YAKELIN regarding patient blood cultures; one set needs to be redrawn;  stated that second set can be redrawn, patient label placed with appropriate time, location, and tech id and order req.

## 2020-04-20 NOTE — CONSULTS
Podiatry Consult Subjective: Pt known to me from consult in March 2020 for wound left foot. CT and bone biopsy were negative for osteomyelitis left 5th metatarsal. Wound was debrided and amniotic graft placed. Pt did not significantly improve despite antibiotics, Sx and wound care. Now reports back to ED for fever and cellulitis left foot. Date of Consultation: April 20, 2020 Referring Physician: Jose Antonio Reyes MD 
 
Patient is a 60 y.o.  female who is being seen for osteomyelitis left foot. Patient Active Problem List  
 Diagnosis Date Noted  Sepsis ([address] 04/20/2020  H/O bilateral mastectomy 03/26/2020  Left leg cellulitis 03/08/2020  Atrial fibrillation with RVR ([address] 03/08/2020  Diabetic ulcer of left midfoot with necrosis of muscle ([address] 03/03/2020  Traumatic hematoma of foot, left, initial encounter 02/25/2020  Type 2 diabetes mellitus with left diabetic foot infection ([address] 10/29/2019  Foot deformity, acquired, left 09/24/2019  Foot deformity, right 09/24/2019  Pes cavus 09/24/2019  Diabetic ulcer of right midfoot associated with type 2 diabetes mellitus, with fat layer exposed ([address] 08/06/2019  Diabetic ulcer of left heel associated with type 2 diabetes mellitus, with fat layer exposed ([address] 06/21/2019  Venous stasis ulcer of right lower leg with edema of right lower leg (HCC) 11/14/2018  Diabetic polyneuropathy associated with type 2 diabetes mellitus ([address] 11/13/2018  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, associated with type 2 diabetes mellitus ([address] 04/25/2018  Morbid obesity with BMI of 40.0-44.9, adult ([address] 05/01/2017  Controlled type 2 diabetes mellitus with stage 4 chronic kidney disease, with long-term current use of insulin ([address] 01/16/2017  PAF (paroxysmal atrial fibrillation) ([address] 11/28/2016  Anemia in stage 4 chronic kidney disease ([address] 11/28/2016  Essential hypertension 08/26/2016  Systolic murmur 85/68/6101  CKD (chronic kidney disease), stage IV ([facility] 06/09/2012  Mixed hyperlipidemia 05/22/2012  Long term (current) use of anticoagulants 04/11/2012  S/P cardiac pacemaker procedure 04/03/2012  Sick sinus syndrome ([facility] 04/02/2012  Status post ablation of atrial fibrillation 12/15/2011  Fe deficiency anemia 04/14/2010  
 History of breast cancer 04/13/2010  Asthma 04/13/2010 Past Medical History:  
Diagnosis Date  Acquired lymphedema Right arm  Arrhythmia  Asthma  Atrial fibrillation ([facility] Dr. Leon Mustafa and Dr. Tony Nole [facility])  Cancer ([facility] bilateral breast  
 Chronic kidney disease  Diabetes mellitus type II   
 Dizziness  Essential hypertension  Hyperlipidemia  Hypertension  Long term (current) use of anticoagulants  Morbid obesity ([facility] 3/14/2012  Nausea & vomiting  Neuropathy  Osteoarthritis  Pacemaker  Sick sinus syndrome ([facility] Past Surgical History:  
Procedure Laterality Date  ABDOMEN SURGERY PROC UNLISTED    
 gastric bypass  GASTRIC BYPASS,OBESE<150CM SAW-EN-Y  7/08  
 [facility]  HX AFIB ABLATION    
 HX BREAST RECONSTRUCTION Bilateral   
 HX CARPAL TUNNEL RELEASE [address] [id number] [address] RIGHT MODIFIED RADICAL   
 HX MASTECTOMY Left   
 no lymphnode removal  
 HX MOHS PROCEDURES  1995  
 right  HX ORTHOPAEDIC Right   
 knee surgery  HX PACEMAKER    
 HX PACEMAKER    
 IR INSERT TUNL CVC W PORT OVER 5 YEARS    
 LAMINECTOMY,CERVICAL  1994  
 NEEDLE BIOPSY LIVER,W OTHR [address]  8.21.07  
 CO [address] AND 1994  CO TRABECULOPLASTY BY LASER SURGERY    
 US GUIDE ASP OVARIAN CYST ABD APPROACH  8.21.07  
 RIGHT Family History Problem Relation Age of Onset  Cancer Mother  Heart Disease Mother  Alcohol abuse Father  Diabetes Brother  Hypertension Brother Social History Tobacco Use  Smoking status: Never Smoker  Smokeless tobacco: Never Used Substance Use Topics  Alcohol use: Yes Comment: rare Current Facility-Administered Medications Medication Dose Route Frequency Provider Last Rate Last Dose  acetaminophen (TYLENOL) tablet 650 mg  650 mg Oral Q6H PRN Debbie Glass, DO      
 glucose chewable tablet 16 g  4 Tab Oral PRN Cresencio Koyanagi, MD      
 dextrose (D50W) injection syrg 12.5-25 g  25-50 mL IntraVENous PRN Cresencio Koyanagi, MD      
 glucagon [facility]) injection 1 mg  1 mg IntraMUSCular PRN Cresencio Koyanagi, MD      
 insulin lispro (HUMALOG) injection   SubCUTAneous AC&HS Cresencio Koyanagi, MD   Stopped at 04/20/20 0730  WARFARIN INFORMATION NOTE (COUMADIN)   Other QPM Cresencio Koyanagi, MD      
 vancomycin (VANCOCIN) 1250 mg in  ml infusion  1,250 mg IntraVENous Q24H Erasto Gillespie MD      
 levoFLOXacin (LEVAQUIN) 750 mg in D5W IVPB  750 mg IntraVENous Q48H Kymberly Wright NP      
 sodium chloride (NS) flush 5-10 mL  5-10 mL IntraVENous PRN Debbie Glass, DO Allergies Allergen Reactions  Ace Inhibitors Cough  Amlodipine Swelling  Avapro [Irbesartan] Unable to Obtain  Bactrim [Sulfamethoxazole-Trimethoprim] Other (comments) Sore mouth  Captopril Cough  Carvedilol Diarrhea [address]  Morphine Nausea and Vomiting and Swelling  Penicillins Hives Did not tolerate cefepime 3/2020  Pravachol [Pravastatin] Nausea Only  Seafood [Shellfish Containing Products] Hives  Singulair [Montelukast] Other (comments)  
  palpitations Review of Systems:  AO x3. Feels ill. +Chills and aches. Objective:  
 
Patient Vitals for the past 8 hrs: 
 BP Temp Pulse Resp SpO2  
04/20/20 0709 117/54 97.9 °F (36.6 °C) 69 16 99 % 04/20/20 0336 120/57 97.4 °F (36.3 °C) 61 16 100 % 20 0225  97.5 °F (36.4 °C)    Temp (24hrs), Av.8 °F (36.6 °C), Min:97.4 °F (36.3 °C), Max:98.5 °F (36.9 °C) Physical Exam Lower Extremity:   
Left foot erythema, edema and calor. Wound lateral left mid-foot has mixed fibrogranular base with thin layer of soft tissue covering 5th metatarsal base. Wound plantar left heel is granular. DP and PT 2/4; CFT less than 3 seconds Sensation intact to light touch Flexible varus position left foot, +POP lateral left mid-foot X-rays, 3 views left foot: Small amount of fragmentation of the left 5th metatarsal base, radiolucency of the bases of metatarsals 3 and 4. Soft tissue defect over the 5th metatarsal base. Lab Review:  
Recent Results (from the past 24 hour(s)) EKG, 12 LEAD, INITIAL Collection Time: 20  9:36 PM  
Result Value Ref Range Ventricular Rate 60 BPM  
 Atrial Rate 60 BPM  
 P-R Interval 200 ms QRS Duration 114 ms Q-T Interval 530 ms QTC Calculation (Bezet) 530 ms Calculated R Axis -27 degrees Calculated T Axis 62 degrees Diagnosis Atrial-paced rhythm Incomplete left bundle branch block Nonspecific ST abnormality Prolonged QT When compared with ECG of 08-MAR-2020 09:03, Electronic atrial pacemaker has replaced Atrial fibrillation Vent. rate has decreased  BPM 
Criteria for Septal infarct are no longer present Confirmed by King Patient ([id number]) on 2020 8:45:36 AM 
  
LACTIC ACID Collection Time: 20  9:53 PM  
Result Value Ref Range Lactic acid 1.0 0.4 - 2.0 MMOL/L  
METABOLIC PANEL, COMPREHENSIVE Collection Time: 20  9:53 PM  
Result Value Ref Range Sodium 134 (L) 136 - 145 mmol/L Potassium 3.5 3.5 - 5.1 mmol/L Chloride 105 97 - 108 mmol/L  
 CO2 15 (LL) 21 - 32 mmol/L Anion gap 14 5 - 15 mmol/L Glucose 218 (H) 65 - 100 mg/dL BUN 37 (H) 6 - 20 MG/DL Creatinine 1.95 (H) 0.55 - 1.02 MG/DL  
 BUN/Creatinine ratio 19 12 - 20  GFR est AA 32 (L) >60 ml/min/1.73m2 GFR est non-AA 26 (L) >60 ml/min/1.73m2 Calcium 8.8 8.5 - 10.1 MG/DL Bilirubin, total 0.4 0.2 - 1.0 MG/DL  
 ALT (SGPT) 9 (L) 12 - 78 U/L  
 AST (SGOT) 12 (L) 15 - 37 U/L Alk. phosphatase 75 45 - 117 U/L Protein, total 7.0 6.4 - 8.2 g/dL Albumin 2.5 (L) 3.5 - 5.0 g/dL Globulin 4.5 (H) 2.0 - 4.0 g/dL A-G Ratio 0.6 (L) 1.1 - 2.2    
CBC WITH AUTOMATED DIFF Collection Time: 04/19/20  9:53 PM  
Result Value Ref Range WBC 14.4 (H) 3.6 - 11.0 K/uL  
 RBC 3.04 (L) 3.80 - 5.20 M/uL HGB 8.8 (L) 11.5 - 16.0 g/dL HCT 28.4 (L) 35.0 - 47.0 % MCV 93.4 80.0 - 99.0 FL  
 MCH 28.9 26.0 - 34.0 PG  
 MCHC 31.0 30.0 - 36.5 g/dL  
 RDW 15.7 (H) 11.5 - 14.5 % PLATELET 127 692 - 795 K/uL MPV 10.1 8.9 - 12.9 FL  
 NRBC 0.0 0  WBC ABSOLUTE NRBC 0.00 0.00 - 0.01 K/uL NEUTROPHILS 89 (H) 32 - 75 % LYMPHOCYTES 5 (L) 12 - 49 % MONOCYTES 5 5 - 13 % EOSINOPHILS 0 0 - 7 % BASOPHILS 0 0 - 1 % IMMATURE GRANULOCYTES 1 (H) 0.0 - 0.5 % ABS. NEUTROPHILS 12.9 (H) 1.8 - 8.0 K/UL  
 ABS. LYMPHOCYTES 0.7 (L) 0.8 - 3.5 K/UL  
 ABS. MONOCYTES 0.7 0.0 - 1.0 K/UL  
 ABS. EOSINOPHILS 0.0 0.0 - 0.4 K/UL  
 ABS. BASOPHILS 0.0 0.0 - 0.1 K/UL  
 ABS. IMM. GRANS. 0.1 (H) 0.00 - 0.04 K/UL  
 DF AUTOMATED    
 RBC COMMENTS ANISOCYTOSIS 1+ 
    
 RBC COMMENTS DAKOTAH CELLS 
PRESENT 
    
PROTHROMBIN TIME + INR Collection Time: 04/19/20  9:53 PM  
Result Value Ref Range INR 2.1 (H) 0.9 - 1.1 Prothrombin time 20.3 (H) 9.0 - 11.1 sec TROPONIN I Collection Time: 04/19/20  9:53 PM  
Result Value Ref Range Troponin-I, Qt. <0.05 <0.05 ng/mL SED RATE (ESR) Collection Time: 04/19/20  9:53 PM  
Result Value Ref Range Sed rate, automated 72 (H) 0 - 30 mm/hr C REACTIVE PROTEIN, QT Collection Time: 04/19/20  9:53 PM  
Result Value Ref Range C-Reactive protein 12.20 (H) 0.00 - 0.60 mg/dL CULTURE, BLOOD  Collection Time: 04/19/20 10:05 PM  
Result Value Ref Range Special Requests: NO SPECIAL REQUESTS Culture result: NO GROWTH AFTER 9 HOURS    
SAMPLES BEING HELD Collection Time: 04/19/20 10:05 PM  
Result Value Ref Range SAMPLES BEING HELD BLDCS   
 COMMENT Add-on orders for these samples will be processed based on acceptable specimen integrity and analyte stability, which may vary by analyte. CULTURE, BLOOD Collection Time: 04/19/20 11:44 PM  
Result Value Ref Range Special Requests: NO SPECIAL REQUESTS Culture result: NO GROWTH AFTER 8 HOURS    
GLUCOSE, POC Collection Time: 04/20/20  7:13 AM  
Result Value Ref Range Glucose (POC) 198 (H) 65 - 100 mg/dL Performed by Joaquim Pereira Impression: 1. Chronic diabetic ulcer left foot 2. Cellulitis left foot 3. Osteomyelitis left metatarsals 3, 4 and 5 Recommendation:  
There are three options for the patient,  
1. Long course of IV antibiotics. Without removal of the source, there is risk of failure. 2. BKA left. Pt declines at this time 3. Chopart's amputation (amputation at Talo-navicular and calcaneal-cuboid joints) and permanent AFO. This would avoid a BKA, but wound continue to put pressure on the plantar left heel. Additionally, like the BKA this is outside the surgical scope podiatry has in [place] and would require either Vascular Sx or Orthopedics to be involved. The pt wants to have IV antibiotics after the discussion. I will consult ID, but will continue to follow.

## 2020-04-20 NOTE — PROGRESS NOTES
Pharmacy Automatic Renal Dosing Protocol - Antimicrobials Indication for Antimicrobials: Left foot cellulitis and osteomyelitis Current Regimen of Each Antimicrobial: 
Vancomycin 1750 mg IV every 24 hours (Start Date ; Day # 2/5) Levofloxacin 750 mg IV every 48 hours (Start Date ; Day # 1) Previous Antimicrobial Therapy: 
Cefepime 1 g IV every 12 hours (Start Date ; Day # 2) Goal Level: VANCOMYCIN TROUGH GOAL RANGE Vancomycin Trough: 15 - 20 mcg/mL  (AUC: 400 - 600 mg/hr/Liter/day) Date Dose & Interval Measured (mcg/mL) Extrapolated (mcg/mL) Date & time of next level:  @ 2300 Significant Cultures:  
 Blood: NGTD x 8 hours- pending  Blood: NGTD x 7 hours- pending Radiology / Imaging results: (X-ray, CT scan or MRI):  
 Lt Foot: Soft tissue wound at the level of the third through fifth proximal metatarsals. Underlying cortical irregularity and lucency within the proximal third through fifth metatarsals may represent osteomyelitis Paralysis, amputations, malnutrition: None Labs: 
Recent Labs 203 CREA 1.95* BUN 37* WBC 14.4* Temp (24hrs), Av.8 °F (36.6 °C), Min:97.4 °F (36.3 °C), Max:98.5 °F (36.9 °C) Creatinine Clearance (mL/min) or Dialysis: 33 mL/min Impression/Plan:  
Will change vancomycin to 1250 mg IV every 24 hours for an estimated trough of 15.2 mcg/mL. Cefepime changed to levofloxacin 750 mg IV every 48 hours as the patient had altered mentation last admission that did improve after cefepime was discontinued and now it allergy list mentions that patient did not tolerate cefepime last month. Please consider changing stop date as appropriate for indication of osteomyelitis Antimicrobial stop date 5 days Pharmacy will follow daily and adjust medications as appropriate for renal function and/or serum levels. Thank you, Dayo Nguyen, PHARMD 
 
Recommended duration of therapy [email or web address]/Pharmacy/Clinical%[email or web address]. [email or web address] Renal Dosing 
[email or web address]/[facility]/Pharmacy/Clinical%20Companion/Renal%[email or web address]. pdf

## 2020-04-20 NOTE — ED PROVIDER NOTES
EMERGENCY DEPARTMENT HISTORY AND PHYSICAL EXAM 
 
 
Date: 4/19/2020 Patient Name: Lloyd Pimentel History of Presenting Illness CC: Leg pain and fever History Provided By: Patient HPI: Lloyd Pimentel, 60 y.o. female presents to the ED with cc of leg pain and fever. Pt to the by POV with PMHx sig for HTN, HPLD Afib on Coumadin, management of chronic wounds to the lower legs. She was most recently admitted to the hospital for cellulitis and dc on 3/22/2020. Since d/c pt had been back on Ab and completed a course a week ago. Review of medical records shows phone calls to her PCP on Friday secondary to worsening pain and fever. Pt advised to go to the ED. Pt states currentlt left leg pain rated 10+/10, worsened with movement, palpation and she has difficulty bearing any weight. Pt states fever and chills, wih Tmax 101 at home. Pt denies any nasal congestion, cough, sore throat or diarrhea. Pt has had nausea but no vomiting. She denies any abdominal pain or dysuria. There are no other complaints, changes, or physical findings at this time. PCP: Dmitri Ulloa MD 
 
No current facility-administered medications on file prior to encounter. Current Outpatient Medications on File Prior to Encounter Medication Sig Dispense Refill  metOLazone (ZAROXOLYN) 2.5 mg tablet Take 1 tablet po on Mon and Thurs for worsening leg swelling. 12 Tab 1  
 warfarin (COUMADIN) 4 mg tablet Take 1 tablet on Tues and Sat and a half tablet the other 5 days. 60 Tab 5  
 bumetanide (BUMEX) 1 mg tablet Take 2 Tabs by mouth daily. 60 Tab 3  
 insulin glargine (Lantus U-100 Insulin) 100 unit/mL injection Inject 14 units daily 10 mL 5  
 metoprolol succinate (TOPROL-XL) 100 mg tablet Take 2 Tabs by mouth daily. 60 Tab 9  
 dilTIAZem CD (CARDIZEM CD) 180 mg ER capsule Take 1 Cap by mouth daily. 30 Cap 4  
 hydrALAZINE (APRESOLINE) 50 mg tablet Take 1 Tab by mouth three (3) times daily. 90 Tab 4  acetaminophen 500 mg tab 500 mg, diphenhydrAMINE 25 mg cap 25 mg Take 2 Doses by mouth nightly.  sertraline (ZOLOFT) 50 mg tablet TAKE ONE AND ONE-HALF (1 & 1/2) TABLET BY MOUTH DAILY 45 Tab 5  
 doxazosin (CARDURA) 4 mg tablet TAKE ONE TABLET BY MOUTH ONCE NIGHTLY 30 Tab 10  
 busPIRone (BUSPAR) 10 mg tablet TAKE ONE TABLET BY MOUTH TWICE A DAY 60 Tab 10  
 cholecalciferol, VITAMIN D3, (VITAMIN D3) 5,000 unit tab tablet Take 5,000 Units by mouth daily.  vitamin A 8,000 unit capsule Take 8,000 Units by mouth daily.  insulin lispro (HUMALOG) 100 unit/mL kwikpen 2 units with dinner for sugars over 200 1 Package 0  
 cyanocobalamin (VITAMIN B-12) 1,000 mcg sublingual tablet Take 1,000 mcg by mouth daily. Past History Past Medical History: 
Past Medical History:  
Diagnosis Date  Acquired lymphedema Right arm  Arrhythmia  Asthma  Atrial fibrillation ([facility] Dr. Francyne Apgar and [facility]  Cancer ([facility] bilateral breast  
 Chronic kidney disease  Diabetes mellitus type II   
 Dizziness  Essential hypertension  Hyperlipidemia  Hypertension  Long term (current) use of anticoagulants  Morbid obesity ([facility] 3/14/2012  Nausea & vomiting  Neuropathy  Osteoarthritis  Pacemaker  Sick sinus syndrome ([facility] Past Surgical History: 
Past Surgical History:  
Procedure Laterality Date  ABDOMEN SURGERY PROC UNLISTED    
 gastric bypass  GASTRIC BYPASS,OBESE<150CM SAW-EN-Y    
 [facility]  HX AFIB ABLATION    
 HX BREAST RECONSTRUCTION Bilateral   
 HX CARPAL TUNNEL RELEASE [address] RIGHT MODIFIED RADICAL   
 HX MASTECTOMY Left   
 no lymphnode removal  
 HX MOHS PROCEDURES    
 right  HX ORTHOPAEDIC Right   
 knee surgery  HX PACEMAKER    
 HX PACEMAKER    
 IR INSERT TUNL CVC W PORT OVER 5 YEARS  [address]  8.21.07  
 MN Aren[address] AND 1994  MN TRABECULOPLASTY BY LASER SURGERY    
 US GUIDE ASP OVARIAN CYST ABD APPROACH  8.21.07  
 RIGHT Family History: 
Family History Problem Relation Age of Onset  Cancer Mother  Heart Disease Mother  Alcohol abuse Father  Diabetes Brother  Hypertension Brother Social History: 
Social History Tobacco Use  Smoking status: Never Smoker  Smokeless tobacco: Never Used Substance Use Topics  Alcohol use: Yes Comment: rare  Drug use: No  
 
 
Allergies: Allergies Allergen Reactions  Ace Inhibitors Cough  Amlodipine Swelling  Avapro [Irbesartan] Unable to Obtain  Bactrim [Sulfamethoxazole-Trimethoprim] Other (comments) Sore mouth  Captopril Cough  Carvedilol Diarrhea [address]  Morphine Nausea and Vomiting and Swelling  Penicillins Hives Did not tolerate cefepime 3/2020  Pravachol [Pravastatin] Nausea Only  Seafood [Shellfish Containing Products] Hives  Singulair [Montelukast] Other (comments)  
  palpitations Review of Systems Review of Systems Constitutional: Positive for chills, fatigue and fever. Negative for appetite change. HENT: Negative. Negative for congestion, rhinorrhea, sinus pressure and sore throat. Eyes: Negative. Respiratory: Negative. Negative for cough, choking, chest tightness, shortness of breath and wheezing. Cardiovascular: Negative. Negative for chest pain, palpitations and leg swelling. Gastrointestinal: Negative for abdominal pain, constipation, diarrhea, nausea and vomiting. Endocrine: Negative. Genitourinary: Negative. Negative for difficulty urinating, dysuria, flank pain and urgency. Musculoskeletal:  
     Left foot, left leg pain and swelling Skin: Positive for wound (Left foot).   
Neurological: Negative. Negative for dizziness, speech difficulty, weakness, light-headedness, numbness and headaches. Psychiatric/Behavioral: Negative. All other systems reviewed and are negative. Physical Exam  
Physical Exam 
Vitals signs and nursing note reviewed. Constitutional:   
   General: She is not in acute distress. Appearance: She is well-developed. She is not diaphoretic. Comments: Morbidly obese, appears uncomfortable HENT:  
   Head: Normocephalic and atraumatic. Mouth/Throat:  
   Mouth: Mucous membranes are moist.  
   Pharynx: No oropharyngeal exudate. Eyes:  
   Conjunctiva/sclera: Conjunctivae normal.  
   Pupils: Pupils are equal, round, and reactive to light. Neck: Musculoskeletal: Normal range of motion and neck supple. Vascular: No JVD. Trachea: No tracheal deviation. Cardiovascular:  
   Rate and Rhythm: Normal rate and regular rhythm. Heart sounds: Normal heart sounds. No murmur. Comments: Pacemaker Pulmonary:  
   Effort: Pulmonary effort is normal. No respiratory distress. Breath sounds: Normal breath sounds. No stridor. No wheezing or rales. Abdominal:  
   General: There is no distension. Palpations: Abdomen is soft. Tenderness: There is no abdominal tenderness. There is no guarding or rebound. Musculoskeletal: Normal range of motion. Right lower leg: Edema present. Left lower leg: Edema present. Skin: 
   General: Skin is warm and dry. Capillary Refill: Capillary refill takes less than 2 seconds. Neurological:  
   Mental Status: She is alert and oriented to person, place, and time. Cranial Nerves: No cranial nerve deficit. Comments: No gross motor or sensory deficits Psychiatric:     
   Mood and Affect: Mood normal.     
   Behavior: Behavior normal.  
 
 
Picture of plantar surface under media file. Diagnostic Study Results Labs - Recent Results (from the past 24 hour(s)) EKG, 12 LEAD, INITIAL Collection Time: 04/19/20  9:36 PM  
Result Value Ref Range Ventricular Rate 60 BPM  
 Atrial Rate 60 BPM  
 P-R Interval 200 ms QRS Duration 114 ms Q-T Interval 530 ms QTC Calculation (Bezet) 530 ms Calculated R Axis -27 degrees Calculated T Axis 62 degrees Diagnosis Atrial-paced rhythm Incomplete left bundle branch block Nonspecific ST abnormality Prolonged QT When compared with ECG of 08-MAR-2020 09:03, Electronic atrial pacemaker has replaced Atrial fibrillation Vent. rate has decreased  BPM 
Criteria for Septal infarct are no longer present Confirmed by Fatimah Miranda ([id number]) on 4/20/2020 8:45:36 AM 
  
LACTIC ACID Collection Time: 04/19/20  9:53 PM  
Result Value Ref Range Lactic acid 1.0 0.4 - 2.0 MMOL/L  
METABOLIC PANEL, COMPREHENSIVE Collection Time: 04/19/20  9:53 PM  
Result Value Ref Range Sodium 134 (L) 136 - 145 mmol/L Potassium 3.5 3.5 - 5.1 mmol/L Chloride 105 97 - 108 mmol/L  
 CO2 15 (LL) 21 - 32 mmol/L Anion gap 14 5 - 15 mmol/L Glucose 218 (H) 65 - 100 mg/dL BUN 37 (H) 6 - 20 MG/DL Creatinine 1.95 (H) 0.55 - 1.02 MG/DL  
 BUN/Creatinine ratio 19 12 - 20 GFR est AA 32 (L) >60 ml/min/1.73m2 GFR est non-AA 26 (L) >60 ml/min/1.73m2 Calcium 8.8 8.5 - 10.1 MG/DL Bilirubin, total 0.4 0.2 - 1.0 MG/DL  
 ALT (SGPT) 9 (L) 12 - 78 U/L  
 AST (SGOT) 12 (L) 15 - 37 U/L Alk. phosphatase 75 45 - 117 U/L Protein, total 7.0 6.4 - 8.2 g/dL Albumin 2.5 (L) 3.5 - 5.0 g/dL Globulin 4.5 (H) 2.0 - 4.0 g/dL A-G Ratio 0.6 (L) 1.1 - 2.2    
CBC WITH AUTOMATED DIFF Collection Time: 04/19/20  9:53 PM  
Result Value Ref Range WBC 14.4 (H) 3.6 - 11.0 K/uL  
 RBC 3.04 (L) 3.80 - 5.20 M/uL HGB 8.8 (L) 11.5 - 16.0 g/dL HCT 28.4 (L) 35.0 - 47.0 % MCV 93.4 80.0 - 99.0 FL  
 MCH 28.9 26.0 - 34.0 PG  
 MCHC 31.0 30.0 - 36.5 g/dL  
 RDW 15.7 (H) 11.5 - 14.5 % PLATELET 124 850 - 671 K/uL MPV 10.1 8.9 - 12.9 FL  
 NRBC 0.0 0  WBC ABSOLUTE NRBC 0.00 0.00 - 0.01 K/uL NEUTROPHILS 89 (H) 32 - 75 % LYMPHOCYTES 5 (L) 12 - 49 % MONOCYTES 5 5 - 13 % EOSINOPHILS 0 0 - 7 % BASOPHILS 0 0 - 1 % IMMATURE GRANULOCYTES 1 (H) 0.0 - 0.5 % ABS. NEUTROPHILS 12.9 (H) 1.8 - 8.0 K/UL  
 ABS. LYMPHOCYTES 0.7 (L) 0.8 - 3.5 K/UL  
 ABS. MONOCYTES 0.7 0.0 - 1.0 K/UL  
 ABS. EOSINOPHILS 0.0 0.0 - 0.4 K/UL  
 ABS. BASOPHILS 0.0 0.0 - 0.1 K/UL  
 ABS. IMM. GRANS. 0.1 (H) 0.00 - 0.04 K/UL  
 DF AUTOMATED    
 RBC COMMENTS ANISOCYTOSIS 1+ 
    
 RBC COMMENTS DAKOTAH CELLS 
PRESENT 
    
PROTHROMBIN TIME + INR Collection Time: 04/19/20  9:53 PM  
Result Value Ref Range INR 2.1 (H) 0.9 - 1.1 Prothrombin time 20.3 (H) 9.0 - 11.1 sec TROPONIN I Collection Time: 04/19/20  9:53 PM  
Result Value Ref Range Troponin-I, Qt. <0.05 <0.05 ng/mL SED RATE (ESR) Collection Time: 04/19/20  9:53 PM  
Result Value Ref Range Sed rate, automated 72 (H) 0 - 30 mm/hr C REACTIVE PROTEIN, QT Collection Time: 04/19/20  9:53 PM  
Result Value Ref Range C-Reactive protein 12.20 (H) 0.00 - 0.60 mg/dL CULTURE, BLOOD Collection Time: 04/19/20 10:05 PM  
Result Value Ref Range Special Requests: NO SPECIAL REQUESTS Culture result: (A) GRAM POSITIVE COCCI IN CLUSTERS GROWING IN THIS SINGLE BOTTLE DRAWN SITE=( RAC) Culture result: (A) PRELIMINARY REPORT OF GRAM POSITIVE COCCI IN CLUSTERS GROWING IN THIS SINGLE BOTTLE DRAWN CALLED TO AND READ BACK BY Kasi Self RN [facility] T [id number] ON 4/20/20. LCC` SAMPLES BEING HELD Collection Time: 04/19/20 10:05 PM  
Result Value Ref Range SAMPLES BEING HELD DCS   
 COMMENT Add-on orders for these samples will be processed based on acceptable specimen integrity and analyte stability, which may vary by analyte. CULTURE, BLOOD Collection Time: 04/19/20 11:44 PM  
Result Value Ref Range  Special Requests: NO SPECIAL REQUESTS Culture result: (A) GRAM POSITIVE COCCI IN CLUSTERS GROWING IN THIS SINGLE BOTTLE DRAWN SITE=(LAC) Culture result: (A) PRELIMINARY REPORT OF GRAM POSITIVE COCCI IN CLUSTERS GROWING IN THIS SINGLE BOTTLE DRAWN CALLED TO AND READ BACK BY Cuate Silveira RN [facility] AT 2115 ON 4/20/20. C  
GLUCOSE, POC Collection Time: 04/20/20  7:13 AM  
Result Value Ref Range Glucose (POC) 198 (H) 65 - 100 mg/dL Performed by Yessy Almanza PROTHROMBIN TIME + INR Collection Time: 04/20/20  9:32 AM  
Result Value Ref Range INR 1.9 (H) 0.9 - 1.1 Prothrombin time 19.3 (H) 9.0 - 11.1 sec GLUCOSE, POC Collection Time: 04/20/20 11:15 AM  
Result Value Ref Range Glucose (POC) 155 (H) 65 - 100 mg/dL Performed by Yessy Almanza GLUCOSE, POC Collection Time: 04/20/20  4:19 PM  
Result Value Ref Range Glucose (POC) 149 (H) 65 - 100 mg/dL Performed by [address] [place] [address] Collection Time: 04/20/20  5:48 PM  
Result Value Ref Range Special Requests: LOOK FOR ANAEROBES   
 GRAM STAIN PENDING Culture result: PENDING   
SED RATE (ESR) Collection Time: 04/20/20  5:48 PM  
Result Value Ref Range Sed rate, automated 85 (H) 0 - 30 mm/hr C REACTIVE PROTEIN, QT Collection Time: 04/20/20  5:48 PM  
Result Value Ref Range C-Reactive protein 14.40 (H) 0.00 - 0.60 mg/dL Radiologic Studies -  
XR CHEST PORT Final Result XR FOOT LT MIN 3 V Final Result IMPRESSION: Soft tissue wound at the level of the third through fifth proximal  
metatarsals. Underlying cortical irregularity and lucency within the proximal  
third through fifth metatarsals may represent osteomyelitis. XR TIB/FIB LT Final Result IMPRESSION: Generalized edema. No soft tissue gas. CT Results  (Last 48 hours) None CXR Results  (Last 48 hours) None Medical Decision Making I am the first provider for this patient.  
 
I reviewed the vital signs, available nursing notes, past medical history, past surgical history, family history and social history. Vital Signs-Reviewed the patient's vital signs. EKG interpretation: (Preliminary) Atrial paced, rate 60. Interpreted by Dora Arreguin DO 
 
Records Reviewed: Nursing Notes, Old Medical Records, Previous Radiology Studies and Previous Laboratory Studies Provider Notes (Medical Decision Making): DDx- Cellulitis, necrotizing fasciitis, UTI, Hematoma collection exacerbtion of chronic lymphedema ED Course:  
Initial assessment performed. The patients presenting problems have been discussed, and they are in agreement with the care plan formulated and outlined with them. I have encouraged them to ask questions as they arise throughout their visit. Pt presented with hx of foot ulcer. Based on presentation and lab and diagnostic work-up concern for Osteomyelitis and bacteremia. IV Ab started, will admit to medicine. Wet to dry dressing placed on left foot wound. Consult Note: 
Case discussed with Dr. Kennedy Herman he will evaluate and admit. Critical Care Time: CRITICAL CARE NOTE : 
 
9:04 PM 
 
IMPENDING DETERIORATION -Cardiovascular, Renal- Bacteremia and dehydration, concern for severe sepsis ASSOCIATED RISK FACTORS - Dehydration, concern for bone infection which could lead to partial amputation MANAGEMENT- Bedside Assessment and Supervision of Care INTERPRETATION -  Xrays, ECG, Blood Pressure, Cardiac Output Measures  and Labs INTERVENTIONS - IVF, pain meds, IV Ab 
CASE REVIEW - Hospitalist and Nursing TREATMENT RESPONSE -Stable PERFORMED BY - Self NOTES   : 
I have spent 40 minutes of critical care time involved in lab review, consultations with specialist, family decision- making, bedside attention and documentation. This time excludes time spent in any separate billed procedures.   During this entire length of time I was immediately available to the patient . Sheldon Irby DO Disposition: 
Admit Diagnosis Clinical Impression: 1. Ulcer of left foot, unspecified ulcer stage ([address] 2. Osteomyelitis of left foot, unspecified type ([address] 3. Cellulitis of left lower extremity Attestations: 
 
Sheldon Irby DO Please note that this dictation was completed with Fara, the computer voice recognition software. Quite often unanticipated grammatical, syntax, homophones, and other interpretive errors are inadvertently transcribed by the computer software. Please disregard these errors. Please excuse any errors that have escaped final proofreading. Thank you.

## 2020-04-20 NOTE — PROGRESS NOTES
Pharmacy Daily Dosing of Warfarin Indication:A.fib Goal INR: 2-3 PTA Warfarin Dose: 4 mg on Tue and Sat and 2 mg all other days Concurrent anticoagulants: None Concurrent antiplatelet: None Major Interacting Medications Drugs that may increase INR: None Drugs that may decrease INR: None Conditions that may increase/decrease INR (CHF, C. diff, cirrhosis, thyroid disorder, hypoalbuminemia): None Labs: 
Recent Labs  
  04/20/20 
0932 04/19/20 
2153 INR 1.9* 2.1* HGB  --  8.8* PLT  --  336 SGOT  --  12* TBILI  --  0.4 ALB  --  2.5* Impression/Plan:  
Will order 4 mg x 1 dose Daily INR 
CBC w/o diff every other day Pharmacy will follow daily and adjust the dose as appropriate. Thanks Leanne Funes, PHARMD 
 
 
[email or web address]%[email or web address]. pdf

## 2020-04-20 NOTE — PROGRESS NOTES
1098 Received handoff report from ED nurse 924 Stewart Griffith RN. Awaiting patients' transport to unit. 2672 Pt arrived to unit on stretcher. With left foot wrapped in kerlex with little drainage; purewick put into place. Pt A&O x 4; Vitals stable; denies pain; no c/o of nausea at this time. Pt has a permanent pacemaker (not MRI compatible; per pt). 5409 Pt spouse called for update on patient. 0710 Bedside shift change report given to Jamari Anderson (oncoming nurse) by Adela Lovett RN (offgoing nurse). Report included the following information Kardex, Intake/Output, MAR, Recent Results and Cardiac Rhythm Paced.

## 2020-04-20 NOTE — PROGRESS NOTES
0700: Bedside shift change report given to Roxie Mckeon (oncoming nurse) by Lupillo Stoddard RN (offgoing nurse). Report included the following information SBAR, Kardex, Intake/Output and MAR.  
 
0700: Patient in bed, resting quietly. Call bell in reach, will monitor. 1500: Patient's oral temperature 103. Tylenol given immediatly, will recheck temp in 30 mins. 1530: Patient's temp came down to 99.5 post tylenol treatment 
 
1900: Bedside shift change report given to Karrie Sharp (oncoming nurse) by Lewis Nelson RN (offgoing nurse). Report included the following information SBAR, Kardex, Intake/Output and MAR.

## 2020-04-21 ENCOUNTER — APPOINTMENT (OUTPATIENT)
Dept: NON INVASIVE DIAGNOSTICS | Age: 60
DRG: 854 | End: 2020-04-21
Attending: NURSE PRACTITIONER
Payer: COMMERCIAL

## 2020-04-21 LAB
ANION GAP SERPL CALC-SCNC: 9 MMOL/L (ref 5–15)
AV VELOCITY RATIO: 0.5
BUN SERPL-MCNC: 40 MG/DL (ref 6–20)
BUN/CREAT SERPL: 21 (ref 12–20)
CALCIUM SERPL-MCNC: 8.6 MG/DL (ref 8.5–10.1)
CHLORIDE SERPL-SCNC: 107 MMOL/L (ref 97–108)
CO2 SERPL-SCNC: 18 MMOL/L (ref 21–32)
CREAT SERPL-MCNC: 1.89 MG/DL (ref 0.55–1.02)
DATE LAST DOSE: ABNORMAL
ECHO AV AREA PEAK VELOCITY: 1.8 CM2
ECHO AV PEAK GRADIENT: 12.8 MMHG
ECHO AV PEAK VELOCITY: 178.8 CM/S
ECHO EST RA PRESSURE: 10 MMHG
ECHO LA AREA 4C: 18.2 CM2
ECHO LA VOL 4C: 54.93 ML (ref 22–52)
ECHO LA VOLUME INDEX A4C: 23.74 ML/M2 (ref 16–28)
ECHO LV E' LATERAL VELOCITY: 6.75 CM/S
ECHO LV E' SEPTAL VELOCITY: 6.26 CM/S
ECHO LV INTERNAL DIMENSION DIASTOLIC MMODE: 4.01 CM
ECHO LV INTERNAL DIMENSION SYSTOLIC MMODE: 2.8 CM
ECHO LV IVSD MMODE: 1.47 CM
ECHO LV IVSS MMODE: 1.79 CM
ECHO LV POSTERIOR WALL DIASTOLIC MMODE: 1.35 CM
ECHO LV POSTERIOR WALL SYSTOLIC MMODE: 1.67 CM
ECHO LVOT DIAM: 2.12 CM
ECHO LVOT PEAK GRADIENT: 3.1 MMHG
ECHO LVOT PEAK VELOCITY: 88.65 CM/S
ECHO MV A VELOCITY: 93.72 CM/S
ECHO MV E DECELERATION TIME (DT): 238.2 MS
ECHO MV E VELOCITY: 134.9 CM/S
ECHO MV E/A RATIO: 1.44
ECHO MV E/E' LATERAL: 19.99
ECHO MV E/E' RATIO (AVERAGED): 20.77
ECHO MV E/E' SEPTAL: 21.55
ECHO MV REGURGITANT PEAK GRADIENT: 66.4 MMHG
ECHO MV REGURGITANT PEAK VELOCITY: 407.3 CM/S
ECHO PULMONARY ARTERY SYSTOLIC PRESSURE (PASP): 62 MMHG
ECHO PV MAX VELOCITY: 102.71 CM/S
ECHO PV PEAK GRADIENT: 4.2 MMHG
ECHO RIGHT VENTRICULAR SYSTOLIC PRESSURE (RVSP): 62 MMHG
ECHO RV INTERNAL DIMENSION: 4.21 CM
ECHO TV REGURGITANT MAX VELOCITY: 360.47 CM/S
ECHO TV REGURGITANT PEAK GRADIENT: 52 MMHG
ERYTHROCYTE [DISTWIDTH] IN BLOOD BY AUTOMATED COUNT: 15.9 % (ref 11.5–14.5)
GLUCOSE BLD STRIP.AUTO-MCNC: 117 MG/DL (ref 65–100)
GLUCOSE BLD STRIP.AUTO-MCNC: 118 MG/DL (ref 65–100)
GLUCOSE BLD STRIP.AUTO-MCNC: 122 MG/DL (ref 65–100)
GLUCOSE SERPL-MCNC: 112 MG/DL (ref 65–100)
HCT VFR BLD AUTO: 25.9 % (ref 35–47)
HGB BLD-MCNC: 8.1 G/DL (ref 11.5–16)
INR PPP: 1.9 (ref 0.9–1.1)
LVFS: 30.22 %
MCH RBC QN AUTO: 28.4 PG (ref 26–34)
MCHC RBC AUTO-ENTMCNC: 31.3 G/DL (ref 30–36.5)
MCV RBC AUTO: 90.9 FL (ref 80–99)
MV DEC SLOPE: 5.66
NRBC # BLD: 0 K/UL (ref 0–0.01)
NRBC BLD-RTO: 0 PER 100 WBC
PLATELET # BLD AUTO: 333 K/UL (ref 150–400)
PMV BLD AUTO: 10.3 FL (ref 8.9–12.9)
POTASSIUM SERPL-SCNC: 3.7 MMOL/L (ref 3.5–5.1)
PROTHROMBIN TIME: 18.5 SEC (ref 9–11.1)
RBC # BLD AUTO: 2.85 M/UL (ref 3.8–5.2)
REPORTED DOSE,DOSE: ABNORMAL UNITS
REPORTED DOSE/TIME,TMG: ABNORMAL
SERVICE CMNT-IMP: ABNORMAL
SODIUM SERPL-SCNC: 134 MMOL/L (ref 136–145)
VANCOMYCIN TROUGH SERPL-MCNC: 20.6 UG/ML (ref 5–10)
WBC # BLD AUTO: 10.2 K/UL (ref 3.6–11)

## 2020-04-21 PROCEDURE — 80048 BASIC METABOLIC PNL TOTAL CA: CPT

## 2020-04-21 PROCEDURE — 65660000000 HC RM CCU STEPDOWN

## 2020-04-21 PROCEDURE — 74011636637 HC RX REV CODE- 636/637: Performed by: INTERNAL MEDICINE

## 2020-04-21 PROCEDURE — 87040 BLOOD CULTURE FOR BACTERIA: CPT

## 2020-04-21 PROCEDURE — 74011250637 HC RX REV CODE- 250/637: Performed by: EMERGENCY MEDICINE

## 2020-04-21 PROCEDURE — 85027 COMPLETE CBC AUTOMATED: CPT

## 2020-04-21 PROCEDURE — 85610 PROTHROMBIN TIME: CPT

## 2020-04-21 PROCEDURE — 82962 GLUCOSE BLOOD TEST: CPT

## 2020-04-21 PROCEDURE — 74011250637 HC RX REV CODE- 250/637: Performed by: NURSE PRACTITIONER

## 2020-04-21 PROCEDURE — 93306 TTE W/DOPPLER COMPLETE: CPT

## 2020-04-21 PROCEDURE — 74011250636 HC RX REV CODE- 250/636: Performed by: INTERNAL MEDICINE

## 2020-04-21 PROCEDURE — 36415 COLL VENOUS BLD VENIPUNCTURE: CPT

## 2020-04-21 PROCEDURE — 87075 CULTR BACTERIA EXCEPT BLOOD: CPT

## 2020-04-21 PROCEDURE — 80202 ASSAY OF VANCOMYCIN: CPT

## 2020-04-21 RX ORDER — BUSPIRONE HYDROCHLORIDE 10 MG/1
10 TABLET ORAL 2 TIMES DAILY
Status: DISCONTINUED | OUTPATIENT
Start: 2020-04-21 | End: 2020-01-01 | Stop reason: HOSPADM

## 2020-04-21 RX ORDER — BUMETANIDE 1 MG/1
2 TABLET ORAL DAILY
Status: DISCONTINUED | OUTPATIENT
Start: 2020-04-22 | End: 2020-01-01

## 2020-04-21 RX ORDER — METOPROLOL SUCCINATE 50 MG/1
200 TABLET, EXTENDED RELEASE ORAL DAILY
Status: DISCONTINUED | OUTPATIENT
Start: 2020-04-22 | End: 2020-01-01

## 2020-04-21 RX ORDER — SERTRALINE HYDROCHLORIDE 50 MG/1
50 TABLET, FILM COATED ORAL DAILY
Status: DISCONTINUED | OUTPATIENT
Start: 2020-04-22 | End: 2020-01-01 | Stop reason: HOSPADM

## 2020-04-21 RX ORDER — DOXAZOSIN 2 MG/1
4 TABLET ORAL
Status: DISCONTINUED | OUTPATIENT
Start: 2020-04-21 | End: 2020-01-01

## 2020-04-21 RX ORDER — WARFARIN 2 MG/1
2 TABLET ORAL ONCE
Status: COMPLETED | OUTPATIENT
Start: 2020-04-21 | End: 2020-04-21

## 2020-04-21 RX ORDER — INSULIN GLARGINE 100 [IU]/ML
10 INJECTION, SOLUTION SUBCUTANEOUS DAILY
Status: DISCONTINUED | OUTPATIENT
Start: 2020-04-22 | End: 2020-01-01 | Stop reason: HOSPADM

## 2020-04-21 RX ORDER — DILTIAZEM HYDROCHLORIDE 180 MG/1
180 CAPSULE, COATED, EXTENDED RELEASE ORAL DAILY
Status: DISCONTINUED | OUTPATIENT
Start: 2020-04-22 | End: 2020-01-01

## 2020-04-21 RX ORDER — HYDRALAZINE HYDROCHLORIDE 50 MG/1
50 TABLET, FILM COATED ORAL 3 TIMES DAILY
Status: DISCONTINUED | OUTPATIENT
Start: 2020-04-21 | End: 2020-01-01

## 2020-04-21 RX ADMIN — DOXAZOSIN 4 MG: 2 TABLET ORAL at 21:00

## 2020-04-21 RX ADMIN — METRONIDAZOLE 500 MG: 500 INJECTION, SOLUTION INTRAVENOUS at 05:22

## 2020-04-21 RX ADMIN — VANCOMYCIN HYDROCHLORIDE 1250 MG: 10 INJECTION, POWDER, LYOPHILIZED, FOR SOLUTION INTRAVENOUS at 23:25

## 2020-04-21 RX ADMIN — BUSPIRONE HYDROCHLORIDE 10 MG: 10 TABLET ORAL at 17:19

## 2020-04-21 RX ADMIN — ACETAMINOPHEN 650 MG: 325 TABLET, FILM COATED ORAL at 08:45

## 2020-04-21 RX ADMIN — WARFARIN SODIUM 2 MG: 2 TABLET ORAL at 17:20

## 2020-04-21 RX ADMIN — HYDRALAZINE HYDROCHLORIDE 50 MG: 50 TABLET, FILM COATED ORAL at 17:19

## 2020-04-21 RX ADMIN — METRONIDAZOLE 500 MG: 500 INJECTION, SOLUTION INTRAVENOUS at 17:21

## 2020-04-21 RX ADMIN — INSULIN LISPRO 1 UNITS: 100 INJECTION, SOLUTION INTRAVENOUS; SUBCUTANEOUS at 22:48

## 2020-04-21 RX ADMIN — HYDRALAZINE HYDROCHLORIDE 50 MG: 50 TABLET, FILM COATED ORAL at 21:00

## 2020-04-21 NOTE — PROGRESS NOTES
Bedside and Verbal shift change report given to Neetu Foreman (oncoming nurse) by Chey Plascencia (offgoing nurse). Report included the following information SBAR, Kardex, Intake/Output, MAR and Recent Results.   lanny

## 2020-04-21 NOTE — PROGRESS NOTES
I reviewed pertinent labs and imaging, and discussed /agreed on the plan of care with Dr. Pricila Cardona. Hospitalist Progress Note NAME: Erin Bhatt :  1960 MRN:  [id number] History of Present Illness: 
Patient was seen on 2020 for left foot wound by podiatry. At that time CT and biopsy were negative for OM of left 5th metatarsal.  Wound was debrided and amniotic graft placed. She did not significantly improve despite antibiotics and wound care. Assessment / Plan: 
Sepsis secondary to left foot cellulitis and OM · Soft tissue wound at the level of the third through fifth proximal metatarsals. Underlying cortical irregularity and lucency within the proximal third through fifth metatarsals may represent osteomyelitis. · CRP 14.20 · BC with PROBABLE STAPHYLOCOCCUS AUREUS ISOLATED FROM THIS SINGLE BOTTLE DRAWN x2 (right and left AC) · Wound cultures pending · Repeat BC pending · ECHO pending · Appreciate podiatry input; patient wants to try a long course of IV antibiotics vs amputation · Continue IV Levaquin, Flagyl, and Vancomycin · ID consulted Atrial fibrillation Hypercoagulable state secondary to afib HTN 
· Continue Cardizem, doxazosin, hydralazine, metoprolol, and warfarin CKD stage IV · Cr 1.89; improved from admission · Avoid nephrotoxins · Monitor Type 2 DM · BS AC&HS 
· SSI · Continue Lantus 10 units daily · Hgb A1c 7.5 (2020) Anemia of chronic disease · Hgb 8.1 · No s/s of active bleeding · Monitor Depression and anxiety · Continue sertraline and buspirone 30.0 - 39.9 Obese / Body mass index is 36.57 kg/m². Code status: Full Prophylaxis: Warfarin Recommended Disposition: Home w/Family Subjective: Chief Complaint / Reason for Physician Visit Follow-up cellulitis and sepsis. Discussed with RN events overnight. Review of Systems: 
Symptom Y/N Comments  Symptom Y/N Comments Fever/Chills n   Chest Pain n   
Poor Appetite n   Edema n   
Cough    Abdominal Pain Sputum    Joint Pain SOB/CHOW n   Pruritis/Rash Nausea/vomit    Tolerating PT/OT Diarrhea    Tolerating Diet y Constipation    Other Could NOT obtain due to:   
 
Objective: VITALS:  
Last 24hrs VS reviewed since prior progress note. Most recent are: 
Patient Vitals for the past 24 hrs: 
 Temp Pulse Resp BP SpO2  
04/21/20 1048 98.4 °F (36.9 °C) 85 16 166/66 98 % 04/21/20 0732 100 °F (37.8 °C) 89 16 167/71 97 % 04/21/20 0519 99.1 °F (37.3 °C) 86 16 155/64 97 % 04/21/20 0325  83 16 157/66 96 % 04/20/20 2235 98.1 °F (36.7 °C) 79 16 158/67 99 % 04/20/20 1920 98.4 °F (36.9 °C) 77 16 143/60 95 % 04/20/20 1534 99.5 °F (37.5 °C) 85 16 159/61 96 % 04/20/20 1503 (!) 103 °F (39.4 °C) 85 16 159/61 94 % Intake/Output Summary (Last 24 hours) at 4/21/2020 1354 Last data filed at 4/21/2020 3481 Gross per 24 hour Intake 120 ml Output 1000 ml Net -880 ml PHYSICAL EXAM: 
General: Pleasant obese  female. NAD 
EENT:  EOMI. Anicteric sclerae. MMM Resp:  CTA bilaterally, no wheezing or rales. No accessory muscle use CV:  Regular  rhythm,  No edema GI:  Soft, Non distended, Non tender.  +Bowel sounds Neurologic:  Alert and oriented X 3, normal speech, Psych:   Good insight. Not anxious nor agitated Skin:  No rashes. No jaundice, left foot with dry and intact dressing Reviewed most current lab test results and cultures  YES Reviewed most current radiology test results   YES Review and summation of old records today    NO Reviewed patient's current orders and MAR    YES 
PMH/SH reviewed - no change compared to H&P 
________________________________________________________________________ Care Plan discussed with: 
  Comments Patient x Family RN x Care Manager Consultant                   Multidiciplinary team rounds were held today with , nursing, pharmacist and clinical coordinator. Patient's plan of care was discussed; medications were reviewed and discharge planning was addressed. ________________________________________________________________________ Total NON critical care TIME:  25   Minutes Total CRITICAL CARE TIME Spent:   Minutes non procedure based Comments >50% of visit spent in counseling and coordination of care    
________________________________________________________________________ Alcon Lane NP Procedures: see electronic medical records for all procedures/Xrays and details which were not copied into this note but were reviewed prior to creation of Plan. LABS: 
I reviewed today's most current labs and imaging studies. Pertinent labs include: 
Recent Labs  
  04/21/20 
0533 04/19/20 
2153 WBC 10.2 14.4* HGB 8.1* 8.8* HCT 25.9* 28.4*  
 336 Recent Labs  
  04/21/20 
0533 04/20/20 
0932 04/19/20 2153 *  --  134* K 3.7  --  3.5   --  105 CO2 18*  --  15* *  --  218* BUN 40*  --  37* CREA 1.89*  --  1.95* CA 8.6  --  8.8 ALB  --   --  2.5* TBILI  --   --  0.4 SGOT  --   --  12* ALT  --   --  9* INR 1.9* 1.9* 2.1* Signed: Alcon Lane NP

## 2020-04-21 NOTE — PROGRESS NOTES
TRANSFER - IN REPORT: 
 
Verbal report received from Marcy(name) on Chirag Zazueta  being received from PCU(unit) for routine progression of care Report consisted of patients Situation, Background, Assessment and  
Recommendations(SBAR). Information from the following report(s) SBAR, Kardex, Intake/Output, MAR and Recent Results was reviewed with the receiving nurse. Opportunity for questions and clarification was provided. Assessment completed upon patients arrival to unit and care assumed.

## 2020-04-21 NOTE — PROGRESS NOTES
1917 Bedside shift change report given to Guilford Councilman, RN (oncoming nurse) by Heidi Krueger RN (offgoing nurse). Report included the following information Kardex, Intake/Output, MAR, Recent Results and Cardiac Rhythm Paced. 2139 tele hospitalist paged. \"Just wanted to inform you of patient's recent lab results. Patient had blood cultures that were drawn 4/19. Both bottles resulted --> gram + cocci in clusters. Pt is already on abx vancomycin and flagyl. \"; and levaquin. 5 tele hospitalist aware and agreed to continue abx course

## 2020-04-21 NOTE — PROGRESS NOTES
Bedside and Verbal shift change report given to [address]  (oncoming nurse) by Roberto Nance (offgoing nurse). Report included the following information SBAR, Kardex, Intake/Output, MAR, Recent Results and Quality Measures.

## 2020-04-21 NOTE — PROGRESS NOTES
Pharmacy Daily Dosing of Warfarin Indication:A.fib Goal INR: 2-3 PTA Warfarin Dose: 4 mg on Tue and Sat and 2 mg all other days Concurrent anticoagulants: None Concurrent antiplatelet: None Major Interacting Medications Drugs that may increase INR: Metronidazole and levofloxacin Drugs that may decrease INR: None Conditions that may increase/decrease INR (CHF, C. diff, cirrhosis, thyroid disorder, hypoalbuminemia): None Labs: 
Recent Labs  
  04/21/20 
0533 04/20/20 
0932 04/19/20 
2153 INR 1.9* 1.9* 2.1* HGB 8.1*  --  8.8*   --  336 SGOT  --   --  12* TBILI  --   --  0.4 ALB  --   --  2.5* Impression/Plan:  
Will order 2 mg x 1 dose Daily INR 
CBC w/o diff every other day Pharmacy will follow daily and adjust the dose as appropriate. Thanks Bushra Bower PHARMD 
 
 
http://[facility]/[place]/[facility]/Pharmacy/Clinical%20Companion/Warfarin%20Dosing%20Protocol. pdf

## 2020-04-21 NOTE — PROGRESS NOTES
0700: Bedside shift change report given to Betty Figueroa and Debby Lovell (oncoming nurse) by Cristianmacy Jose RN (offgoing nurse). Report included the following information SBAR, Kardex, Intake/Output and MAR.

## 2020-04-21 NOTE — PROGRESS NOTES
Telemed: Just wanted to inform you of patient's recent lab results. Patient had blood cultures that were drawn 4/19. Both bottles resulted --> gram + cocci in clusters. Pt is already on abx vancomycin and flagyl. Plan: Chart reviewed. Stay the course with abx

## 2020-04-21 NOTE — ROUTINE PROCESS
Reason for Readmission: Sepsis RUR Score/Risk Level:    30% PCP: First and Last name:  Rosy Cantu Name of Practice: [facility] [place] Are you a current patient: Yes/No: Yes Approximate date of last visit: March 2020 Is a Care Conference indicated:  Not at this time Did you attend your follow up appointment (s): If not, why not: Yes Resources/supports as identified by patient/family:  Lives with Family Top Challenges facing patient (as identified by patient/family and CM): Finances/Medication cost?  Insurance Coverage Transportation   Family to transport Support system or lack thereof? Family support Living arrangements? Lives with family Self-care/ADLs/Cognition? Independent with ADls Current Advanced Directive/Advance Care Plan:  FULL-Spouse Plan for utilizing home health: Possible [facility] Transition of Care Plan:    Based on readmission, the patient's previous Plan of Care 
 has been evaluated and/or modified. The current Transition of Care Plan is:        
 
CM completed room visit, via telephone to complete pt assessment. Pt reported that she lives with family in their one story home. Pt reported that she is active with PCP: seen April 2020 and [facility] Pt is known to be independent with ADLs, and drives. Pt will have family to transport home at the time of d/c. Pt is known to use DME at home: walker and cane. Pt has had HHC in the past, but no snf. Pt listed her spouse as medical decision maker. Pt denies additional services at the time of d/c. Pt could benefit from [facility] at the time of d/c. CM will continue to follow. Care Management Interventions PCP Verified by CM: Yes Mode of Transport at Discharge: Other (see comment) Transition of Care Consult (CM Consult): Discharge Planning Discharge Durable Medical Equipment: No 
Physical Therapy Consult: No 
Occupational Therapy Consult: No 
Speech Therapy Consult: No 
Current Support Network: Lives with Spouse, Own Home Confirm Follow Up Transport: Family Discharge Location Discharge Placement: Home [facility] [place], [facility] [address] [id number]

## 2020-04-22 ENCOUNTER — APPOINTMENT (OUTPATIENT)
Dept: CARDIAC CATH/INVASIVE PROCEDURES | Age: 60
DRG: 854 | End: 2020-04-22
Attending: INTERNAL MEDICINE
Payer: COMMERCIAL

## 2020-04-22 ENCOUNTER — APPOINTMENT (OUTPATIENT)
Dept: VASCULAR SURGERY | Age: 60
DRG: 854 | End: 2020-04-22
Attending: INTERNAL MEDICINE
Payer: COMMERCIAL

## 2020-04-22 LAB
ANION GAP SERPL CALC-SCNC: 8 MMOL/L (ref 5–15)
BACTERIA SPEC CULT: ABNORMAL
BACTERIA SPEC CULT: ABNORMAL
BUN SERPL-MCNC: 33 MG/DL (ref 6–20)
BUN/CREAT SERPL: 20 (ref 12–20)
CALCIUM SERPL-MCNC: 8.4 MG/DL (ref 8.5–10.1)
CHLORIDE SERPL-SCNC: 109 MMOL/L (ref 97–108)
CO2 SERPL-SCNC: 19 MMOL/L (ref 21–32)
CREAT SERPL-MCNC: 1.68 MG/DL (ref 0.55–1.02)
GLUCOSE BLD STRIP.AUTO-MCNC: 110 MG/DL (ref 65–100)
GLUCOSE BLD STRIP.AUTO-MCNC: 112 MG/DL (ref 65–100)
GLUCOSE BLD STRIP.AUTO-MCNC: 135 MG/DL (ref 65–100)
GLUCOSE BLD STRIP.AUTO-MCNC: 174 MG/DL (ref 65–100)
GLUCOSE SERPL-MCNC: 100 MG/DL (ref 65–100)
INR PPP: 2.2 (ref 0.9–1.1)
POTASSIUM SERPL-SCNC: 3.7 MMOL/L (ref 3.5–5.1)
PROTHROMBIN TIME: 21.9 SEC (ref 9–11.1)
SERVICE CMNT-IMP: ABNORMAL
SODIUM SERPL-SCNC: 136 MMOL/L (ref 136–145)

## 2020-04-22 PROCEDURE — 74011250637 HC RX REV CODE- 250/637: Performed by: NURSE PRACTITIONER

## 2020-04-22 PROCEDURE — 74011250636 HC RX REV CODE- 250/636: Performed by: FAMILY MEDICINE

## 2020-04-22 PROCEDURE — 74011250637 HC RX REV CODE- 250/637: Performed by: HOSPITALIST

## 2020-04-22 PROCEDURE — 85610 PROTHROMBIN TIME: CPT

## 2020-04-22 PROCEDURE — 36415 COLL VENOUS BLD VENIPUNCTURE: CPT

## 2020-04-22 PROCEDURE — 74011250636 HC RX REV CODE- 250/636: Performed by: INTERNAL MEDICINE

## 2020-04-22 PROCEDURE — 94760 N-INVAS EAR/PLS OXIMETRY 1: CPT

## 2020-04-22 PROCEDURE — 93922 UPR/L XTREMITY ART 2 LEVELS: CPT

## 2020-04-22 PROCEDURE — 82962 GLUCOSE BLOOD TEST: CPT

## 2020-04-22 PROCEDURE — 74011250637 HC RX REV CODE- 250/637: Performed by: INTERNAL MEDICINE

## 2020-04-22 PROCEDURE — 74011250636 HC RX REV CODE- 250/636: Performed by: NURSE PRACTITIONER

## 2020-04-22 PROCEDURE — 65660000000 HC RM CCU STEPDOWN

## 2020-04-22 PROCEDURE — 74011636637 HC RX REV CODE- 636/637: Performed by: INTERNAL MEDICINE

## 2020-04-22 PROCEDURE — 74011250637 HC RX REV CODE- 250/637: Performed by: EMERGENCY MEDICINE

## 2020-04-22 PROCEDURE — 80048 BASIC METABOLIC PNL TOTAL CA: CPT

## 2020-04-22 RX ORDER — HYDRALAZINE HYDROCHLORIDE 20 MG/ML
10 INJECTION INTRAMUSCULAR; INTRAVENOUS
Status: DISCONTINUED | OUTPATIENT
Start: 2020-04-22 | End: 2020-01-01

## 2020-04-22 RX ORDER — OXYCODONE AND ACETAMINOPHEN 5; 325 MG/1; MG/1
1 TABLET ORAL ONCE
Status: COMPLETED | OUTPATIENT
Start: 2020-04-22 | End: 2020-04-22

## 2020-04-22 RX ORDER — WARFARIN 2 MG/1
2 TABLET ORAL ONCE
Status: ACTIVE | OUTPATIENT
Start: 2020-04-22 | End: 2020-01-01

## 2020-04-22 RX ADMIN — METRONIDAZOLE 500 MG: 500 INJECTION, SOLUTION INTRAVENOUS at 06:34

## 2020-04-22 RX ADMIN — ACETAMINOPHEN 650 MG: 325 TABLET, FILM COATED ORAL at 22:33

## 2020-04-22 RX ADMIN — OXYCODONE AND ACETAMINOPHEN 1 TABLET: 5; 325 TABLET ORAL at 02:46

## 2020-04-22 RX ADMIN — HYDRALAZINE HYDROCHLORIDE 50 MG: 50 TABLET, FILM COATED ORAL at 10:11

## 2020-04-22 RX ADMIN — DOXAZOSIN 4 MG: 2 TABLET ORAL at 22:33

## 2020-04-22 RX ADMIN — METOPROLOL SUCCINATE 200 MG: 50 TABLET, EXTENDED RELEASE ORAL at 10:11

## 2020-04-22 RX ADMIN — HYDRALAZINE HYDROCHLORIDE 50 MG: 50 TABLET, FILM COATED ORAL at 22:33

## 2020-04-22 RX ADMIN — BUSPIRONE HYDROCHLORIDE 10 MG: 10 TABLET ORAL at 17:26

## 2020-04-22 RX ADMIN — HYDRALAZINE HYDROCHLORIDE 50 MG: 50 TABLET, FILM COATED ORAL at 17:26

## 2020-04-22 RX ADMIN — BUMETANIDE 2 MG: 1 TABLET ORAL at 10:11

## 2020-04-22 RX ADMIN — INSULIN GLARGINE 10 UNITS: 100 INJECTION, SOLUTION SUBCUTANEOUS at 10:11

## 2020-04-22 RX ADMIN — BUSPIRONE HYDROCHLORIDE 10 MG: 10 TABLET ORAL at 10:11

## 2020-04-22 RX ADMIN — SERTRALINE HYDROCHLORIDE 50 MG: 50 TABLET ORAL at 10:11

## 2020-04-22 RX ADMIN — ACETAMINOPHEN 650 MG: 325 TABLET, FILM COATED ORAL at 01:07

## 2020-04-22 RX ADMIN — METRONIDAZOLE 500 MG: 500 INJECTION, SOLUTION INTRAVENOUS at 17:26

## 2020-04-22 RX ADMIN — LEVOFLOXACIN 750 MG: 5 INJECTION, SOLUTION INTRAVENOUS at 20:18

## 2020-04-22 RX ADMIN — DILTIAZEM HYDROCHLORIDE 180 MG: 180 CAPSULE, COATED, EXTENDED RELEASE ORAL at 10:12

## 2020-04-22 RX ADMIN — HYDRALAZINE HYDROCHLORIDE 10 MG: 20 INJECTION INTRAMUSCULAR; INTRAVENOUS at 01:06

## 2020-04-22 NOTE — CONSULTS
Infectious Disease Consult Carlos Sifuentes MD FAC Date of Consultation:  April 21,2020 Referring Physician:  Dr Reji Robertson Subjective:  
 
Patient is a 60 y.o. female who is being seen for- OM L/foot IMPRESSION:  
 
- Sepsis - Bacteremia with probable MSSA  
  BC - 4/19- probable MSSA - 1/1 - LAC BC-  4/19-probable MSSA - 1/1 - RAC BC- 4/21- pending ECHO - possible vegetation on AV Pacemaker present - no swelling , erythema - Cellulitis of LLE , recurrent Previous admissions for same - 3/8-3/22, 1/14-1/20 RLE cellulitis - 12/6-12/9/19 
 - Diabetic foot ulcer , OM of foot WC - 4/20- Light probable MSSA Previous  - 3/10- MSSA , Proteus mirabilis Xray - 
: Soft tissue wound at the level of the third through fifth proximal 
metatarsals. Underlying cortical irregularity and lucency within the proximal 
third through fifth metatarsals may represent osteomyelitis. - CKD 4 Cr 1.89 
-Atrial fibrillation - ESR / CRP 85/14.4  
- Diabetes mellitus with neuropathy A1c 7.5 
- Penicillin allergy - hives PLAN:  
  
 - Continue Vancomycin/ Levaquin / Flagyl pending final cultures - Pharmacy on Consult for antibiotic dosing, target Vanc  trough 15 - Pt for MIKE evaluate valves ,pacer wires - Recommend CT scan of foot  To better evaluate extent of OM 
  -Recommend Vascular evaluation  
  -D/w pt at length, all questions answered. Sandy Rodriguez, 60 y.o. female presented  to the ED with cc of leg pain and fever. Pt has PMHx sig for HTN, HPLD Afib on Coumadin, management of chronic wounds to the lower legs. She was most recently admitted to the hospital for cellulitis 3/8-3/22/20, before that 1/14/20-1/20/20. Pt admitted with cellulitis RLE on 12/6-12/9/19. . Since d/c pt had been back on Ab and completed a course a week ago. Review of medical records shows phone calls to her PCP on Friday secondary to worsening pain and fever & . Pt advised to go to the ED.   
Wbc on admission 14.4 , t max 103 on 4/20, 100 on 4/21 Xray - 4/20 FINDINGS: Three views of the left foot demonstrate no fracture or other acute 
osseous or articular abnormality. There is evidence of chronic amputation of the 
second toe. The bones are osteopenic. There is midfoot DJD with an overlying 
soft tissue wound. Irregularity and lucency in the proximal third, fourth, and 
fifth metatarsals is noted, which may represent the sequela of osteomyelitis. 
  
IMPRESSION IMPRESSION: Soft tissue wound at the level of the third through fifth proximal 
metatarsals. Underlying cortical irregularity and lucency within the proximal 
third through fifth metatarsals may represent osteomyelitis BC - 4/19 RAC- probable MSSA 1/1 LAC - probable MSSA 
WC - 4/20- light probable MSSA Of note WC - 3/10- MSSA , Proteus mirabilis Pt seen today . LLE warm swollen, wound draining , dressing+ Pt has some pain in LLE Patient Active Problem List  
Diagnosis Code  History of breast cancer Z85.3  Asthma J45.909  Fe deficiency anemia D50.9  Status post ablation of atrial fibrillation Z98.890, Z86.79  
 Sick sinus syndrome ([facility]) I49.5  S/P cardiac pacemaker procedure Z95.0  Long term (current) use of anticoagulants Z79.01  
 Mixed hyperlipidemia E78.2  CKD (chronic kidney disease), stage IV ([facility]) N18.4  Systolic murmur R30.7  Essential hypertension I10  
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  
 Diabetic ulcer of right midfoot associated with type 2 diabetes mellitus, with fat layer exposed ([facility] E11.621, U34.540  Foot deformity, acquired, left M21.962  Foot deformity, right M21.961  Pes cavus Q66.70  Type 2 diabetes mellitus with left diabetic foot infection ([facility]) E11.628, L08.9  Traumatic hematoma of foot, left, initial encounter P00.29UF  Diabetic ulcer of left midfoot with necrosis of muscle ([facility] [facility] E11.621, M76.124  Left leg cellulitis [address]. [address]  Atrial fibrillation with RVR ([facility]) I48.91  
 H/O bilateral mastectomy Z90.13  Sepsis ([facility] A41.9 Past Medical History:  
Diagnosis Date  Acquired lymphedema Right arm  Arrhythmia  Asthma  [facility]rial fibrillation [facility] Dr. Sofia Cosme and Dr. Jolie Sifuentes [facility])  Cancer ([facility] bilateral breast  
 Chronic kidney disease  Diabetes mellitus type II   
 Dizziness  Essential hypertension  Hyperlipidemia  Hypertension  Long term (current) use of anticoagulants  Morbid obesity ([facility] 3/14/2012  Nausea & vomiting  Neuropathy  Osteoarthritis  Pacemaker  Sick sinus syndrome ([facility] Family History Problem Relation Age of Onset  Cancer Mother  Heart Disease Mother  Alcohol abuse Father  Diabetes Brother  Hypertension Brother Social History Tobacco Use  Smoking status: Never Smoker  Smokeless tobacco: Never Used Substance Use Topics  Alcohol use: Yes Comment: rare Past Surgical History:  
Procedure Laterality Date  ABDOMEN SURGERY PROC UNLISTED    
 gastric bypass  GASTRIC BYPASS,OBESE<150CM SAW-EN-Y  7/08  
 [place]  HX AFIB ABLATION    
 HX BREAST RECONSTRUCTION Bilateral   
 HX CARPAL TUNNEL RELEASE [address] RIGHT MODIFIED RADICAL   
 HX MASTECTOMY Left   
 no lymphnode removal  
 HX MOHS PROCEDURES  1995  
 right  HX ORTHOPAEDIC Right   
 knee surgery  HX PACEMAKER    
 HX PACEMAKER    
 IR INSERT TUNL CVC W PORT OVER 5 YEARS    
 LAMINECTOMY,CERVICAL  1994  
 NEEDLE BIOPSY LIVER,W OTHR [address]  8.21.07  
 HI [address] AND 1994  HI TRABECULOPLASTY BY LASER SURGERY    
 US GUIDE ASP OVARIAN CYST ABD APPROACH  8.21.07  
 RIGHT Prior to Admission medications Medication Sig Start Date End Date Taking? Authorizing Provider  
metOLazone (ZAROXOLYN) 2.5 mg tablet Take 1 tablet po on Mon and Thurs for worsening leg swelling. 4/13/20  Yes Gaurav Bradshaw MD  
warfarin (COUMADIN) 4 mg tablet Take 1 tablet on Tues and Sat and a half tablet the other 5 days. 4/10/20  Yes Gaurav Bradshaw MD  
bumetanide (BUMEX) 1 mg tablet Take 2 Tabs by mouth daily. 3/26/20  Yes Gaurav Bradshaw MD  
insulin glargine (Lantus U-100 Insulin) 100 unit/mL injection Inject 14 units daily 3/26/20  Yes Gaurav Bradshaw MD  
metoprolol succinate (TOPROL-XL) 100 mg tablet Take 2 Tabs by mouth daily. 3/26/20  Yes Gaurav Bradshaw MD  
dilTIAZem CD (CARDIZEM CD) 180 mg ER capsule Take 1 Cap by mouth daily. 3/22/20  Yes Lauryn Catalan MD  
hydrALAZINE (APRESOLINE) 50 mg tablet Take 1 Tab by mouth three (3) times daily. 3/22/20  Yes Lauryn Catalan MD  
acetaminophen 500 mg tab 500 mg, diphenhydrAMINE 25 mg cap 25 mg Take 2 Doses by mouth nightly.    Yes Provider, Historical  
sertraline (ZOLOFT) 50 mg tablet TAKE ONE AND ONE-HALF (1 & 1/2) TABLET BY MOUTH DAILY 8/22/19  Yes Gaurav Bradshaw MD  
doxazosin (CARDURA) 4 mg tablet TAKE ONE TABLET BY MOUTH ONCE NIGHTLY 6/14/19  Yes Gaurav Bradshaw MD  
busPIRone (BUSPAR) 10 mg tablet TAKE ONE TABLET BY MOUTH TWICE A DAY 19  Yes Bam Silveira MD  
cholecalciferol, VITAMIN D3, (VITAMIN D3) 5,000 unit tab tablet Take 5,000 Units by mouth daily. Yes Provider, Historical  
vitamin A 8,000 unit capsule Take 8,000 Units by mouth daily. Yes Provider, Historical  
insulin lispro (HUMALOG) 100 unit/mL kwikpen 2 units with dinner for sugars over 200 1/3/14  Yes Bam Silveira MD  
cyanocobalamin (VITAMIN B-12) 1,000 mcg sublingual tablet Take 1,000 mcg by mouth daily. Yes Provider, Historical  
 
Allergies Allergen Reactions  Ace Inhibitors Cough  Amlodipine Swelling  Avapro [Irbesartan] Unable to Obtain  Bactrim [Sulfamethoxazole-Trimethoprim] Other (comments) Sore mouth  Captopril Cough  Carvedilol Diarrhea [address]  Morphine Nausea and Vomiting and Swelling  Penicillins Hives Did not tolerate cefepime 3/2020  Pravachol [Pravastatin] Nausea Only  Seafood [Shellfish Containing Products] Hives  Singulair [Montelukast] Other (comments)  
  palpitations Review of Systems:  A comprehensive review of systems was negative except for that written in the History of Present Illness. 10 point review of systems obtained . All other systems negative Objective:  
Blood pressure 167/77, pulse 88, temperature 97.5 °F (36.4 °C), resp. rate 16, height 5' 10\" (1.778 m), weight 254 lb 13.6 oz (115.6 kg), SpO2 98 %, not currently breastfeeding. Temp (24hrs), Av.1 °F (36.7 °C), Min:97.5 °F (36.4 °C), Max:98.5 °F (36.9 °C) Current Facility-Administered Medications Medication Dose Route Frequency  hydrALAZINE (APRESOLINE) 20 mg/mL injection 10 mg  10 mg IntraVENous Q6H PRN  
 dilTIAZem CD (CARDIZEM CD) capsule 180 mg  180 mg Oral DAILY  insulin glargine (LANTUS) injection 10 Units  10 Units SubCUTAneous DAILY  bumetanide (BUMEX) tablet 2 mg  2 mg Oral DAILY  busPIRone (BUSPAR) tablet 10 mg  10 mg Oral BID  doxazosin (CARDURA) tablet 4 mg  4 mg Oral QHS  hydrALAZINE (APRESOLINE) tablet 50 mg  50 mg Oral TID  metoprolol succinate (TOPROL-XL) XL tablet 200 mg  200 mg Oral DAILY  sertraline (ZOLOFT) tablet 50 mg  50 mg Oral DAILY  [START ON 4/23/2020] vancomycin (VANCOCIN) 1,000 mg in 0.9% sodium chloride (MBP/ADV) 250 mL  1,000 mg IntraVENous Q24H  
 acetaminophen (TYLENOL) tablet 650 mg  650 mg Oral Q6H PRN  
 glucose chewable tablet 16 g  4 Tab Oral PRN  
 dextrose (D50W) injection syrg 12.5-25 g  25-50 mL IntraVENous PRN  
 glucagon (GLUCAGEN) injection 1 mg  1 mg IntraMUSCular PRN  
 insulin lispro (HUMALOG) injection   SubCUTAneous AC&HS  WARFARIN INFORMATION NOTE (COUMADIN)   Other QPM  
 levoFLOXacin (LEVAQUIN) 750 mg in D5W IVPB  750 mg IntraVENous Q48H  
 metroNIDAZOLE (FLAGYL) IVPB premix 500 mg  500 mg IntraVENous Q12H  
 sodium chloride (NS) flush 5-10 mL  5-10 mL IntraVENous PRN Exam:   
General:  Awake cooperative, Eyes:  Sclera anicteric. Pupils equally round and reactive to light. Mouth/Throat: Mucous membranes normal, oral pharynx clear Neck: Supple Lungs:   Clear to auscultation CV:  Regular rate and rhythm,no murmur, click, rub or gallop Abdomen:   Soft, non-tender. bowel sounds normal. non-distended Extremities: No cyanosis or edema Skin: Skin color, texture, turgor normal. no acute rash or lesions Lymph nodes: Cervical and supraclavicular normal  
Musculoskeletal: LLE swelling, warm, foot dressing + Lines/Devices:  Intact, no erythema, drainage or tenderness Psych: Alert and oriented, normal mood affect Data Review: CBC:  
Recent Labs  
  04/21/20 
0533 04/19/20 
2153 WBC 10.2 14.4*  
RBC 2.85* 3.04* HGB 8.1* 8.8* HCT 25.9* 28.4*  
 336 GRANS  --  89* LYMPH  --  5* EOS  --  0 CMP:  
Recent Labs  
  04/22/20 
0117 04/21/20 
0533 04/19/20 
2153  112* 218*  134* 134* K 3.7 3.7 3.5 CL 109* 107 105 CO2 19* 18* 15* BUN 33* 40* 37* CREA 1.68* 1.89* 1.95* CA 8.4* 8.6 8.8 AGAP 8 9 14 BUCR 20 21* 19  
AP  --   --  75  
TP  --   --  7.0 ALB  --   --  2.5*  
GLOB  --   --  4.5* AGRAT  --   --  0.6* Lab Results Component Value Date/Time Culture result: NO GROWTH AFTER 21 HOURS 04/21/2020 08:50 AM  
 Culture result: LIGHT POSSIBLE STAPHYLOCOCCUS AUREUS (A) 04/20/2020 05:48 PM  
 Culture result: CHECKING FOR POSSIBLE 
OTHER ORGANISMS 
 04/20/2020 05:48 PM  
 Culture result: (A) 04/19/2020 11:44 PM  
  STAPHYLOCOCCUS AUREUS GROWING IN THIS SINGLE BOTTLE DRAWN (SITE = LAC) Culture result: (A) 04/19/2020 11:44 PM  
  PRELIMINARY REPORT OF GRAM POSITIVE COCCI IN CLUSTERS GROWING IN THIS SINGLE BOTTLE DRAWN CALLED TO AND READ BACK BY Mac Weber RN [facility] AT 2115 ON 4/20/20. [facility]  
  
 
 
XR Results (most recent): 
Results from Hospital Encounter encounter on 04/19/20 XR TIB/FIB LT Narrative EXAM: XR TIB/FIB LT 
 
INDICATION: swelling, assess for gas. COMPARISON: None. FINDINGS: AP and lateral  views of the left tibia and fibula demonstrate no 
fracture or dislocation. There is generalized edema and vascular calcifications 
and there are degenerative changes of the knee and ankle. There is no soft 
tissue gas. Impression IMPRESSION: Generalized edema. No soft tissue gas. ICD-10-CM ICD-9-CM 1. Ulcer of left foot, unspecified ulcer stage ([facility] [address] L97.529 707.15   
2. Osteomyelitis of left foot, unspecified type ([facility] [address] M86.9 730.27 3. Cellulitis of left lower extremity L03.116 682. 6 Antibiotic History I have discussed the diagnosis with the patient and the intended plan as seen in the above orders. I have discussed medication side effects and warnings with the patient as well. Reviewed test results at length with patient Signed By: Nick Zamarripa MD [facility]

## 2020-04-22 NOTE — PROGRESS NOTES
Consult received for MIKE to evaluate for possible endocarditis. Echo 4/21/2020 shows possible aortic vegetation on the left coronary cusp of the valve Dr. aNncy Wilson and I discussed the MIKE procedure, risks and benefits. The patient had a previous MIKE with Dr. Elio Padilla over 10 years ago to evaluate for endocarditis (negative), so she is familiar with the procedure. Ms. Chris Mascorro agrees to proceed. Significant hx of pacemaker placement in 2012 Plan for MIKE 4/23/2020 at 10:00AM 
Consent NPO after midnight Thank you for consulting RCA

## 2020-04-22 NOTE — PROGRESS NOTES
Pharmacy Automatic Renal Dosing Protocol - Antimicrobials Indication for Antimicrobials: Left foot cellulitis and osteomyelitis Current Regimen of Each Antimicrobial: 
Vancomycin 1250 mg IV every 24 hours (Start Date ; Day # 3/5) Levofloxacin 750 mg IV every 48 hours (Start Date ; Day # 2) Metronidazole 500 mg IV every 12 hours (Start Date ; Day # 2) Previous Antimicrobial Therapy: 
Cefepime 1 g IV every 12 hours (Start Date ; Day # 2) Goal Level: VANCOMYCIN TROUGH GOAL RANGE Vancomycin Trough: 15 - 20 mcg/mL  (AUC: 400 - 600 mg/hr/Liter/day) Date Dose & Interval Measured (mcg/mL) Extrapolated (mcg/mL)  
 @ 22:38 1250 mg Q24H 20.6 20.5 Date & time of next level:  @ 2300 Significant Cultures:  
 Blood: GPC in clusters in - pending  Wound: pending Radiology / Imaging results: (X-ray, CT scan or MRI):  
 Lt Foot: Soft tissue wound at the level of the third through fifth proximal metatarsals. Underlying cortical irregularity and lucency within the proximal third through fifth metatarsals may represent osteomyelitis Paralysis, amputations, malnutrition: None Labs: 
Recent Labs  
  20 
0533 20 
2153 CREA 1.89* 1.95* BUN 40* 37* WBC 10.2 14.4* Temp (24hrs), Av.6 °F (37 °C), Min:97.9 °F (36.6 °C), Max:100 °F (37.8 °C) Creatinine Clearance (mL/min) or Dialysis: 34 mL/min Impression/Plan:  
Vancomycin trough resulted supratherapeutic at 20.5 mcg/ml. Dose reduced to 1000 mg IV every 24 hours (predicted trough 16.4 mcg/ml; ) Pharmacy will follow daily and adjust medications as appropriate for renal function and/or serum levels. Thank you, Elier Machado, PHARMD

## 2020-04-22 NOTE — PROGRESS NOTES
**Consult Information** 
Member Facility: [facility] Facility MRN: [id number] Consult ID: [id number] Facility Time Zone: ET 
Date and Time of Request: 04/22/2020 02:18:52 AM 
Requesting Clinician: Kelsey Kim Patient Name: Johnathan Sarah Date of Birth: [id number] Gender: Female **Clinical Note** Clinical Note: Patient's headache is unrelieved by Tylenol. Her BP is improved. Order placed for Percocet. Patient states she has tolerated Percocet in the past.  D/w nursing. **Attestation** Interaction Mode: Phone Only Phone Duration (mins): 2 Time of Call : 04/22/2020 02:23 AM 
Interaction Attestation: Interprofessional internet consultation was delivered through telephonic and/or electronic communication upon the request of the patients treating provider, while the requesting and the rendering provider were not in the same physical location. A written summary report was provided to the requesting provider at the originating site. Evaluation Duration (mins): 2 **Physician Signature** This document was electronically signed by: Darnell Mccain MD  04/22/2020 02:24 AM

## 2020-04-22 NOTE — PROGRESS NOTES
I reviewed pertinent labs and imaging, and discussed /agreed on the plan of care with Dr. Soo Garcia. Hospitalist Progress Note NAME: Priya Sarabia :  1960 MRN:  [id number] History of Present Illness: 
Patient was seen on 2020 for left foot wound by podiatry. At that time CT and biopsy were negative for OM of left 5th metatarsal.  Wound was debrided and amniotic graft placed. She did not significantly improve despite antibiotics and wound care. Assessment / Plan: 
Sepsis secondary to left foot cellulitis and OM · Soft tissue wound at the level of the third through fifth proximal metatarsals. Underlying cortical irregularity and lucency within the proximal third through fifth metatarsals may represent osteomyelitis. · CRP 14.20 · BC with STAPHYLOCOCCUS AUREUS GROWING IN THIS SINGLE BOTTLE DRAWN and PROBABLE STAPHYLOCOCCUS AUREUS ISOLATED FROM SINGLE BOTTLE DRAWN 
· Wound cultures with LIGHT POSSIBLE STAPHYLOCOCCUS AUREUS  
· Repeat BC with no growth after 21 hours · ECHO:  Normal cavity size and systolic function (ejection fraction normal). Moderate concentric hypertrophy. Estimated left ventricular ejection fraction is 55 - 60%. Visually measured ejection fraction. Mild present. Mild to moderate aortic valve regurgitation is present. Possible vegetation is present on the Aortic vegetation is on the left coronary cusp of the valve. aortic valve. Mitral valve thickening. Mitral annular calcification. Mild to moderate mitral valve regurgitation is present. Moderate to severe pulmonary hypertension. Pulmonary arterial systolic pressure is 62 mmHg. Mildly elevated central venous pressure (5-10 mmHg); IVC diameter is less than 21 mm and collapses less than 50% with respiration. · Cardiology consulted · MIKE ordered · Appreciate podiatry input; patient wants to try a long course of IV antibiotics vs amputation · Continue IV Levaquin, Flagyl, and Vancomycin · Appreciate ID input Atrial fibrillation Hypercoagulable state secondary to afib HTN 
· Continue Cardizem, doxazosin, hydralazine, metoprolol, and warfarin CKD stage IV · Cr 1.68; improved from admission · Avoid nephrotoxins · Monitor Type 2 DM · BS AC&HS 
· SSI · Continue Lantus 10 units daily · Hgb A1c 7.5 (03/08/2020) Anemia of chronic disease · Hgb 8.1 · No s/s of active bleeding · Monitor Depression and anxiety · Continue sertraline and buspirone 30.0 - 39.9 Obese / Body mass index is 36.57 kg/m². Code status: Full Prophylaxis: Warfarin Recommended Disposition: Home w/Family Subjective: Chief Complaint / Reason for Physician Visit Follow-up cellulitis and sepsis. Discussed with RN events overnight. Review of Systems: 
Symptom Y/N Comments  Symptom Y/N Comments Fever/Chills n   Chest Pain n   
Poor Appetite n   Edema n   
Cough    Abdominal Pain Sputum    Joint Pain SOB/CHOW n   Pruritis/Rash Nausea/vomit    Tolerating PT/OT Diarrhea    Tolerating Diet y Constipation    Other Could NOT obtain due to:   
 
Objective: VITALS:  
Last 24hrs VS reviewed since prior progress note. Most recent are: 
Patient Vitals for the past 24 hrs: 
 Temp Pulse Resp BP SpO2  
04/22/20 0830 97.5 °F (36.4 °C) 88 16 167/77 98 % 04/22/20 0323 98.1 °F (36.7 °C) 99 16 157/71 94 % 04/22/20 0220  88  135/67   
04/21/20 2317 98.5 °F (36.9 °C) (!) 103 20 197/86 97 % 04/21/20 2033    (!) 197/94   
04/21/20 1954 98 °F (36.7 °C) 92 16 191/86 98 % 04/21/20 1554 97.9 °F (36.6 °C) 91 16 169/88 97 % 04/21/20 1501    167/79   
04/21/20 1437 98.2 °F (36.8 °C) 89 16 167/79 97 % 04/21/20 1048 98.4 °F (36.9 °C) 85 16 166/66 98 % Intake/Output Summary (Last 24 hours) at 4/22/2020 1044 Last data filed at 4/21/2020 1453 Gross per 24 hour Intake 120 ml Output 750 ml Net -630 ml PHYSICAL EXAM: 
General: No acute distress. Pleasant obese  female. EENT:  EOMI. Anicteric sclerae. MMM Resp:  Lungs clear bilaterally, no wheezing or rales. No accessory muscle use CV:  RRR,  No edema GI:  Soft, Non distended, Non tender.  +Bowel sounds Neurologic:  Alert and oriented X 3, normal speech, Psych:   Good insight. Not anxious nor agitated Skin:  No rashes. No jaundice, left foot with dry and intact dressing Reviewed most current lab test results and cultures  YES Reviewed most current radiology test results   YES Review and summation of old records today    NO Reviewed patient's current orders and MAR    YES 
PMH/SH reviewed - no change compared to H&P 
________________________________________________________________________ Care Plan discussed with: 
  Comments Patient x Family RN x Care Manager Consultant  x Cardiology NP Multidiciplinary team rounds were held today with , nursing, pharmacist and clinical coordinator. Patient's plan of care was discussed; medications were reviewed and discharge planning was addressed. ________________________________________________________________________ Total NON critical care TIME:  25   Minutes Total CRITICAL CARE TIME Spent:   Minutes non procedure based Comments >50% of visit spent in counseling and coordination of care    
________________________________________________________________________ Marcel Hamman, NP Procedures: see electronic medical records for all procedures/Xrays and details which were not copied into this note but were reviewed prior to creation of Plan. LABS: 
I reviewed today's most current labs and imaging studies. Pertinent labs include: 
Recent Labs  
  04/21/20 
0533 04/19/20 2153 WBC 10.2 14.4* HGB 8.1* 8.8* HCT 25.9* 28.4*  
 336 Recent Labs  
  04/22/20 
0117 04/21/20 
0533 04/20/20 
0932 04/19/20 2153  134*  --  134* K 3.7 3.7  --  3.5 * 107  --  105 CO2 19* 18* --  15*  112*  --  218* BUN 33* 40*  --  37* CREA 1.68* 1.89*  --  1.95* CA 8.4* 8.6  --  8.8 ALB  --   --   --  2.5* TBILI  --   --   --  0.4 SGOT  --   --   --  12* ALT  --   --   --  9* INR 2.2* 1.9* 1.9* 2.1* Signed: Serge Patton, NP

## 2020-04-22 NOTE — PROGRESS NOTES
Bedside and Verbal shift change report given to Raymond Isidro (oncoming nurse) by Xuan Castillo (offgoing nurse). Report included the following information SBAR, Kardex, Intake/Output and MAR.

## 2020-04-22 NOTE — PROGRESS NOTES
Pharmacy Daily Dosing of Warfarin Indication:A.fib Goal INR: 2-3 PTA Warfarin Dose: 4 mg on Tue and Sat and 2 mg all other days Concurrent anticoagulants: None Concurrent antiplatelet: None Major Interacting Medications Drugs that may increase INR: Metronidazole and levofloxacin Drugs that may decrease INR: None Conditions that may increase/decrease INR (CHF, C. diff, cirrhosis, thyroid disorder, hypoalbuminemia): None Labs: 
Recent Labs  
  04/22/20 
0117 04/21/20 
0533 04/20/20 
0932 04/19/20 
2153 INR 2.2* 1.9* 1.9* 2.1* HGB  --  8.1*  --  8.8* PLT  --  333  --  336 SGOT  --   --   --  12* TBILI  --   --   --  0.4 ALB  --   --   --  2.5* Impression/Plan:  
Will order 2 mg x 1 dose Daily INR 
CBC w/o diff every other day Pharmacy will follow daily and adjust the dose as appropriate. Thanks La Eastman PHARMD 
 
 
[email or web address]/Pharmacy/Clinical%20Companion/Warfarin%20Dosing%20Protocol. pdf

## 2020-04-22 NOTE — PROGRESS NOTES
Bedside shift change report given to GURINDER Baca (oncoming nurse) by Chidi Arellano (offgoing nurse). Report included the following information SBAR, Kardex, Intake/Output, MAR, Recent Results and Cardiac Rhythm NSR/paced. Gave pt 10mg hydralazine 1x for 685 systolic. BP stable after med. Gave percocet 1x for headache, pt resting during pain reassessment.

## 2020-04-22 NOTE — PROGRESS NOTES
Problem: Falls - Risk of 
Goal: *Absence of Falls Description: Document Alfonso Fajardo Fall Risk and appropriate interventions in the flowsheet. Outcome: Progressing Towards Goal 
Note: Fall Risk Interventions: 
Mobility Interventions: Bed/chair exit alarm, Communicate number of staff needed for ambulation/transfer, Patient to call before getting OOB, Utilize walker, cane, or other assistive device Medication Interventions: Bed/chair exit alarm, Evaluate medications/consider consulting pharmacy, Patient to call before getting OOB, Teach patient to arise slowly Elimination Interventions: Bed/chair exit alarm, Call light in reach, Patient to call for help with toileting needs, Toileting schedule/hourly rounds

## 2020-04-22 NOTE — PROGRESS NOTES
Pharmacy Automatic Renal Dosing Protocol - Antimicrobials Indication for Antimicrobials: Left foot cellulitis and osteomyelitis Current Regimen of Each Antimicrobial: 
Vancomycin 1250 mg IV every 24 hours (Start Date ; Day # 4/5) Levofloxacin 750 mg IV every 48 hours (Start Date ; Day # 3) Metronidazole 500 mg IV every 12 hours (Start Date ; Day # 3) Previous Antimicrobial Therapy: 
Cefepime 1 g IV every 12 hours (Start Date ; Day # 2) Goal Level: VANCOMYCIN TROUGH GOAL RANGE Vancomycin Trough: 15 - 20 mcg/mL  (AUC: 400 - 600 mg/hr/Liter/day) Date Dose & Interval Measured (mcg/mL) Extrapolated (mcg/mL)  
 @ 22:38 1250 mg Q24H 20.6 20.5 Date & time of next level: , not entered at this time. Significant Cultures:  
 Blood: GPC in clusters in - final (S to oxacilin)  Wound: light staph aureus - pending : blood - NGTD - pending Radiology / Imaging results: (X-ray, CT scan or MRI):  
 Lt Foot: Soft tissue wound at the level of the third through fifth proximal metatarsals. Underlying cortical irregularity and lucency within the proximal third through fifth metatarsals may represent osteomyelitis Paralysis, amputations, malnutrition: None Labs: 
Recent Labs  
  20 
0117 20 
0533 20 
2153 CREA 1.68* 1.89* 1.95* BUN 33* 40* 37* WBC  --  10.2 14.4* Temp (24hrs), Av °F (36.7 °C), Min:97.5 °F (36.4 °C), Max:98.5 °F (36.9 °C) Creatinine Clearance (mL/min) or Dialysis: 34 mL/min Impression/Plan:  
Vancomycin trough resulted supratherapeutic at 20.5 mcg/ml. Dose reduced to 1000 mg IV every 24 hours (predicted trough 16.4 mcg/ml; ). Please consider changing stop date as appropriate for indication of osteomyelitis (informed Kymberly NP), ID now consulted. Note new dose entered w/o a stop date Per Kymberly's note from today, \"Appreciate podiatry input; patient wants to try a long course of IV antibiotics vs amputation\" Antimicrobial stop date 5 days per vanc consult Pharmacy will follow daily and adjust medications as appropriate for renal function and/or serum levels. Thank you, RICO FrazierD 
 
Recommended duration of therapy 
http://[facility]/[place]/[facility]/Pharmacy/Clinical%20Companion/Duration%20of%20ABX%20therapy. docx Renal Dosing 
http://[facility]/[place]/[facility]/Pharmacy/Clinical%20Companion/Renal%[id number]. pdf

## 2020-04-23 NOTE — PROGRESS NOTES
TRANSFER - OUT REPORT: 
 
Verbal report given to [facility] RN on Seema Farah being transferred to [facility](unit) for routine post - op Report consisted of patient's Situation, Background, Assessment and  
Recommendations(SBAR). Information from the following report(s) Procedure Summary was reviewed with the receiving nurse. Opportunity for questions and clarification was provided. Patient transported with: 
 Registered Nurse Tech

## 2020-04-23 NOTE — WOUND CARE
Wound Care nurse consult from staff nurse for bilateral foot wounds. Patient is a 59 y/o CF admitted for sepsis secondary to left foot cellulitis and osteomyelitis. Past Medical History:  
Diagnosis Date  Acquired lymphedema Right arm  Arrhythmia  Asthma  Atrial fibrillation ([facility] [address] Dr. María Lee [facility]  Cancer ([facility] [address] bilateral breast  
 Chronic kidney disease  Diabetes mellitus type II   
 Dizziness  Essential hypertension  Hyperlipidemia  Hypertension  Long term (current) use of anticoagulants  Morbid obesity ([facility] [address] 3/14/2012  Nausea & vomiting  Neuropathy  Osteoarthritis  Pacemaker  Sick sinus syndrome ([facility] [address] Past Surgical History:  
Procedure Laterality Date  ABDOMEN SURGERY PROC UNLISTED    
 gastric bypass  GASTRIC BYPASS,OBESE<150CM SAW-EN-Y  7/08  
 [place]  HX AFIB ABLATION    
 HX BREAST RECONSTRUCTION Bilateral   
 HX CARPAL TUNNEL RELEASE [address] [id number] [address] RIGHT MODIFIED RADICAL   
 HX MASTECTOMY Left   
 no lymphnode removal  
 HX MOHS PROCEDURES  1995  
 right  HX ORTHOPAEDIC Right   
 knee surgery  HX PACEMAKER    
 HX PACEMAKER    
 IR INSERT TUNL CVC W PORT OVER 5 YEARS    
 LAMINECTOMY,CERVICAL  1994  
 NEEDLE BIOPSY LIVER,W OTHR [address]  8.21.07  
 [place] [address] AND 1994  [place] TRABECULOPLASTY BY LASER SURGERY    
 US GUIDE ASP OVARIAN CYST ABD APPROACH  8.21.07  
 RIGHT Patient was seen by Dr Cyrus Soni, podiatry on 4/20 and Dr Cyrus Soni is out of town now for a family emergency. Dr Alexys Guan note 4/20 suggests different options but there is no plan. Patient voiced to this nurse that she did not like any of the choices Dr Cyrus Soni gave to her.   I contacted hospitalist NP, Romeo Soto, and asked that she reach out to Dr Alexys Guan group and Dr. Eugenio Ferris who is covering for Dr Cordon's patients. Patient has Charcot feet and both wounds are at the center of the deformed collapsed arch Left foot wound: 
 
 
Right foot wound: WOUND POA CONDITIONS Wound Foot Left;Plantar #1 12/17/19 (Active) Dressing Status Removed 4/23/2020  1:54 PM  
Dressing Type Silver products;Dry dressing 4/23/2020  1:54 PM  
Non-staged Wound Description Full thickness 4/23/2020  1:54 PM  
Wound Length (cm) 6 cm 4/23/2020  1:54 PM  
Wound Width (cm) 10 cm 4/23/2020  1:54 PM  
Wound Depth (cm) 1 cm 4/23/2020  1:54 PM  
Wound Surface Area (cm^2) 60 cm^2 4/23/2020  1:54 PM  
Wound Volume (cm^3) 60 cm^3 4/23/2020  1:54 PM  
Change in Wound Size % -1042.86 4/23/2020  1:54 PM  
Condition of [place] 4/23/2020  1:54 PM  
Condition of Edges Calloused 4/23/2020  1:54 PM  
Drainage Amount Small 4/23/2020  1:54 PM  
Drainage Color Tan 4/23/2020  1:54 PM  
Wound Odor None 4/23/2020  1:54 PM  
Sandra-wound Assessment Intact 4/23/2020  1:54 PM  
Cleansing and Cleansing Agents  Betadine 4/23/2020  1:54 PM  
Dressing Changed Changed/New 4/23/2020  1:54 PM  
Dressing Type Applied Moist to dry 4/23/2020  1:54 PM  
Procedure Tolerated Well 4/23/2020  1:54 PM  
Number of days: 128 Wound Foot Right;Plantar small diabetic ulcer on bottom of right foot (Active) Dressing Status Removed 4/23/2020  1:54 PM  
Dressing Type Foam 4/23/2020  1:54 PM  
Non-staged Wound Description Full thickness 4/23/2020  1:54 PM  
Shape round 4/23/2020  1:54 PM  
Wound Length (cm) 0.5 cm 4/23/2020  1:54 PM  
Wound Width (cm) 0.5 cm 4/23/2020  1:54 PM  
Wound Depth (cm) 0.5 cm 4/23/2020  1:54 PM  
Wound Surface Area (cm^2) 0.25 cm^2 4/23/2020  1:54 PM  
Wound Volume (cm^3) 0.12 cm^3 4/23/2020  1:54 PM  
Condition of [facility] 4/23/2020  1:54 PM  
Condition of Edges Calloused 4/23/2020  1:54 PM  
Tissue Type Percent Yellow 100 4/23/2020  1:54 PM  
Drainage Amount Small 4/23/2020  1:54 PM  
Drainage Color Tan 4/23/2020  1:54 PM  
Wound Odor None 4/23/2020  1:54 PM  
Sandra-wound Assessment Intact 4/23/2020  1:54 PM  
Cleansing and Cleansing Agents  Dermal wound cleanser 4/23/2020  1:54 PM  
Dressing Changed Changed/New 4/23/2020  1:54 PM  
Dressing Type Applied Silver products; Moist to dry 4/23/2020  1:54 PM  
Procedure Tolerated Well 4/23/2020  1:54 PM  
Number of days: 45 Recommend and done: 
 
Left plantar foot wound: daily, cleanse with povidone/betadine moistened 4x4. Apply betadine moist 4x4's and then dry dressing to wound. Right plantar foot wound: daily, cleanse with dermal wound cleanser spray, apply a piece of silver Opticell-AG moistened with some cleanser spray and secure with dry dressing. Plan: further wound care needs to be addressed by podiatry.  
 
Dpahne Fleming RN, [employer]

## 2020-04-23 NOTE — PROGRESS NOTES
Pharmacy Automatic Renal Dosing Protocol - Antimicrobials Indication for Antimicrobials: Left foot cellulitis and osteomyelitis Current Regimen of Each Antimicrobial: 
Vancomycin 1250 mg IV every 24 hours (Start Date ; Day # 4/5) Levofloxacin 750 mg IV every 48 hours (Start Date ; Day # 4) Metronidazole 500 mg IV every 12 hours (Start Date ; Day # 4) Previous Antimicrobial Therapy: 
Cefepime 1 g IV every 12 hours (Start Date ; Day # 2) Goal Level: VANCOMYCIN TROUGH GOAL RANGE Vancomycin Trough: 15 - 20 mcg/mL  (AUC: 400 - 600 mg/hr/Liter/day) Date Dose & Interval Measured (mcg/mL) Extrapolated (mcg/mL)  
 @ 22:38 1250 mg Q24H 20.6 20.5 Date & time of next level: , not entered at this time as 5 day duration will be done Significant Cultures:  
 Blood: GPC in clusters in - final (S to oxacilin)  Wound: light staph aureus - pending : blood - NGTD - pending Radiology / Imaging results: (X-ray, CT scan or MRI):  
 Lt Foot: Soft tissue wound at the level of the third through fifth proximal metatarsals. Underlying cortical irregularity and lucency within the proximal third through fifth metatarsals may represent osteomyelitis Paralysis, amputations, malnutrition: None Labs: 
Recent Labs  
  20 
0221 20 
0117 20 
0533 CREA 1.54* 1.68* 1.89* BUN 27* 33* 40* WBC 6.5  --  10.2 Temp (24hrs), Av °F (36.7 °C), Min:97 °F (36.1 °C), Max:98.4 °F (36.9 °C) Creatinine Clearance (mL/min) or Dialysis: 34 mL/min Impression/Plan:  
Continue vancomycin 1000mg IV every 24hr Please consider changing stop date as appropriate for indication of osteomyelitis (informed Kymberly NP), ID now consulted. Note new dose entered w/o a stop date Per Kymberly's note from today, \"Appreciate podiatry input; patient wants to try a long course of IV antibiotics vs amputation\" Antimicrobial stop date 5 days per vanc consult Pharmacy will follow daily and adjust medications as appropriate for renal function and/or serum levels. Thank you, Jonathan Chavira PHARMD 
 
Recommended duration of therapy 
http://[facility]/[place]/[facility]/Pharmacy/Clinical%20Companion/Duration%20of%20ABX%20therapy. docx Renal Dosing 
http://[facility]/[place]/[facility]/Pharmacy/Clinical%20Companion/Renal%[id number]. pdf

## 2020-04-23 NOTE — PROGRESS NOTES
I reviewed pertinent labs and imaging, and discussed /agreed on the plan of care with Dr. Shaan Chan. Hospitalist Progress Note NAME: Michael Hernandez :  1960 MRN:  [id number] History of Present Illness: 
Patient was seen on 2020 for left foot wound by podiatry. At that time CT and biopsy were negative for OM of left 5th metatarsal.  Wound was debrided and amniotic graft placed. She did not significantly improve despite antibiotics and wound care. Assessment / Plan: 
Sepsis secondary to left foot cellulitis and OM · Soft tissue wound at the level of the third through fifth proximal metatarsals. Underlying cortical irregularity and lucency within the proximal third through fifth metatarsals may represent osteomyelitis. · CRP 14.20 · BC with STAPHYLOCOCCUS AUREUS  
· Wound cultures with LIGHT STAPHYLOCOCCUS AUREUS and FEW STREPTOCOCCI, BETA HEMOLYTIC GROUP G 
· Repeat BC with no growth after 2 days · ECHO:  Normal cavity size and systolic function (ejection fraction normal). Moderate concentric hypertrophy. Estimated left ventricular ejection fraction is 55 - 60%. Visually measured ejection fraction. Mild present. Mild to moderate aortic valve regurgitation is present. Possible vegetation is present on the Aortic vegetation is on the left coronary cusp of the valve. aortic valve. Mitral valve thickening. Mitral annular calcification. Mild to moderate mitral valve regurgitation is present. Moderate to severe pulmonary hypertension. Pulmonary arterial systolic pressure is 62 mmHg. Mildly elevated central venous pressure (5-10 mmHg); IVC diameter is less than 21 mm and collapses less than 50% with respiration. · Appreciate cardiology input · MIKE with Lambl's excrescences visualized on the valve · Appreciate podiatry input; patient wants to try a long course of IV antibiotics vs amputation · Continue IV Levaquin, Flagyl, and Vancomycin · Appreciate ID input · MIGUEL A:  Bilateral ABIs are unreliable due to medial calcinosis. PVR and PPG waveforms appear within normal limits bilaterally. Bilateral TBIs are normal.  
· CT lower ext ordered · Appreciate wound care input Atrial fibrillation Hypercoagulable state secondary to afib HTN 
· Continue Cardizem, doxazosin, hydralazine, metoprolol, and warfarin CKD stage IV · Cr 1.54; improved from admission · Avoid nephrotoxins · Monitor Type 2 DM · BS AC&HS 
· SSI · Continue Lantus 10 units daily · Hgb A1c 7.5 (03/08/2020) Anemia of chronic disease · Hgb 7.9 · No s/s of active bleeding · Monitor Depression and anxiety · Continue sertraline and buspirone 30.0 - 39.9 Obese / Body mass index is 35.6 kg/m². Code status: Full Prophylaxis: Warfarin Recommended Disposition: Home w/Family Subjective: Chief Complaint / Reason for Physician Visit Follow-up cellulitis and sepsis. Discussed with RN events overnight. Review of Systems: 
Symptom Y/N Comments  Symptom Y/N Comments Fever/Chills n   Chest Pain n   
Poor Appetite n   Edema n   
Cough    Abdominal Pain Sputum    Joint Pain SOB/CHOW n   Pruritis/Rash Nausea/vomit    Tolerating PT/OT Diarrhea    Tolerating Diet y Constipation    Other Could NOT obtain due to:   
 
Objective: VITALS:  
Last 24hrs VS reviewed since prior progress note. Most recent are: 
Patient Vitals for the past 24 hrs: 
 Temp Pulse Resp BP SpO2  
04/23/20 1328 97.8 °F (36.6 °C) 78 16 164/74 95 % 04/23/20 1138 97.8 °F (36.6 °C) 70 16 149/71 97 % 04/23/20 0741 98.4 °F (36.9 °C) 65 16 160/74 97 % 04/23/20 0321 98 °F (36.7 °C) 78 16 122/60 96 % 04/22/20 2244 98.2 °F (36.8 °C) 75 17 126/64 97 % 04/22/20 1933 98.3 °F (36.8 °C) 76 17 126/50 96 % 04/22/20 1549 97.9 °F (36.6 °C) 71 18 133/58 98 % Intake/Output Summary (Last 24 hours) at 4/23/2020 1420 Last data filed at 4/23/2020 0713 Gross per 24 hour Intake 0 ml Output 1300 ml Net -1300 ml PHYSICAL EXAM: 
General: Pleasant obese  female. NAD 
EENT:  EOMI. Anicteric sclerae. MMM Resp:  CTA, no wheezing or rales. No accessory muscle use CV:  Regular rate rhythm,   No edema GI:  Soft, Non distended, Non tender.  +Bowel sounds Neurologic:  Alert and oriented X 3, normal speech, Psych:   Good insight. Not anxious nor agitated Skin:  No rashes. No jaundice, left foot with dry and intact dressing Reviewed most current lab test results and cultures  YES Reviewed most current radiology test results   YES Review and summation of old records today    NO Reviewed patient's current orders and MAR    YES 
PMH/ reviewed - no change compared to H&P 
________________________________________________________________________ Care Plan discussed with: 
  Comments Patient x Family RN x Care Manager Consultant  x Dr. Fam Banerjee, Podiatry and Dr. Sherlyn Ramirez, [facility] team rounds were held today with , nursing, pharmacist and clinical coordinator. Patient's plan of care was discussed; medications were reviewed and discharge planning was addressed. ________________________________________________________________________ Total NON critical care TIME:  25   Minutes Total CRITICAL CARE TIME Spent:   Minutes non procedure based Comments >50% of visit spent in counseling and coordination of care    
________________________________________________________________________ Carolinehyrkim Face, NP Procedures: see electronic medical records for all procedures/Xrays and details which were not copied into this note but were reviewed prior to creation of Plan. LABS: 
I reviewed today's most current labs and imaging studies. Pertinent labs include: 
Recent Labs  
  04/23/20 0221 04/21/20 0533 WBC 6.5 10.2 HGB 7.9* 8.1* HCT 25.4* 25.9*  
 333 Recent Labs  
  04/23/20 0221 04/22/20 
0117 04/21/20 
0533  136 134* K 3.6 3.7 3.7  109* 107 CO2 22 19* 18* * 100 112* BUN 27* 33* 40* CREA 1.54* 1.68* 1.89* CA 8.4* 8.4* 8.6 INR 3.1* 2.2* 1.9* Signed: Av Beard, MARCEL

## 2020-04-23 NOTE — PROGRESS NOTES
Bedside shift change report given to Marnie (oncoming nurse) by Raymond Isidro (offgoing nurse). Report included the following information SBAR, Kardex, Intake/Output, MAR and Recent Results. Pt received all IV abx. New IV placed, labs drawn. NPO since midnight for MIKE today.

## 2020-04-23 NOTE — PROGRESS NOTES
Bedside and Verbal shift change report given to Zulma (oncoming nurse) by Luli Hill (offgoing nurse). Report included the following information SBAR, Kardex, Intake/Output, MAR and Recent Results.

## 2020-04-23 NOTE — PROGRESS NOTES
Pharmacy Daily Dosing of Warfarin Indication:A.fib Goal INR: 2-3 PTA Warfarin Dose: 4 mg on Tue and Sat and 2 mg all other days Concurrent anticoagulants: None Concurrent antiplatelet: None Major Interacting Medications Drugs that may increase INR: Metronidazole and levofloxacin Drugs that may decrease INR: None Conditions that may increase/decrease INR (CHF, C. diff, cirrhosis, thyroid disorder, hypoalbuminemia): None Labs: 
Recent Labs  
  04/23/20 
0221 04/22/20 
0117 04/21/20 
0533 INR 3.1* 2.2* 1.9* HGB 7.9*  --  8.1*  
  --  333 Impression/Plan:  
Will order 1 mg x 1 dose Daily INR 
CBC w/o diff every other day Pharmacy will follow daily and adjust the dose as appropriate. Thanks Xiomy Peterson PHARMD 
 
 
[email or web address]. pdf

## 2020-04-24 PROBLEM — L03.116 LEFT LEG CELLULITIS: Chronic | Status: ACTIVE | Noted: 2020-03-08

## 2020-04-24 PROBLEM — L97.422 DIABETIC ULCER OF LEFT HEEL ASSOCIATED WITH TYPE 2 DIABETES MELLITUS, WITH FAT LAYER EXPOSED (HCC): Chronic | Status: ACTIVE | Noted: 2019-06-21

## 2020-04-24 PROBLEM — E11.621 DIABETIC ULCER OF LEFT HEEL ASSOCIATED WITH TYPE 2 DIABETES MELLITUS, WITH FAT LAYER EXPOSED (HCC): Chronic | Status: ACTIVE | Noted: 2019-06-21

## 2020-04-24 NOTE — PROGRESS NOTES
I reviewed pertinent labs and imaging, and discussed /agreed on the plan of care with Dr. Agusto oPtter. Hospitalist Progress Note NAME: Isaac Vazquez :  1960 MRN:  [id number] History of Present Illness: 
Patient was seen on 2020 for left foot wound by podiatry. At that time CT and biopsy were negative for OM of left 5th metatarsal.  Wound was debrided and amniotic graft placed. She did not significantly improve despite antibiotics and wound care. Assessment / Plan: 
Sepsis secondary to left foot cellulitis and OM Bacteremia with MSSA Vegetation on pacemaker lead · 2020:  Soft tissue wound at the level of the third through fifth proximal metatarsals. Underlying cortical irregularity and lucency within the proximal third through fifth metatarsals may represent osteomyelitis. · 2020:  BC with STAPHYLOCOCCUS AUREUS  
· 2020: Wound cultures with LIGHT STAPHYLOCOCCUS AUREUS and FEW STREPTOCOCCI, BETA HEMOLYTIC GROUP G 
· 2020:  Repeat BC with no growth after 3 days · 2020:  ECHO:  Normal cavity size and systolic function (ejection fraction normal). Moderate concentric hypertrophy. Estimated left ventricular ejection fraction is 55 - 60%. Visually measured ejection fraction. Mild present. Mild to moderate aortic valve regurgitation is present. Possible vegetation is present on the Aortic vegetation is on the left coronary cusp of the valve. aortic valve. Mitral valve thickening. Mitral annular calcification. Mild to moderate mitral valve regurgitation is present. Moderate to severe pulmonary hypertension. Pulmonary arterial systolic pressure is 62 mmHg. Mildly elevated central venous pressure (5-10 mmHg); IVC diameter is less than 21 mm and collapses less than 50% with respiration. · 2020:  MIGUEL A:  Bilateral ABIs are unreliable due to medial calcinosis. PVR and PPG waveforms appear within normal limits bilaterally.  Bilateral TBIs are normal.  
· 04/23/2020:  MIKE with Lambl's excrescences visualized on the valve · 04/23/2020:  CT lower ext:  . Large soft tissue ulcer at the base of the fifth metatarsal. No definite evidence of osteomyelitis but if clinical concern is high MRI imaging would better evaluate · Continue IV Flagyl, and Vancomycin · Appreciate podiatry input; patient wants to try a long course of IV antibiotics vs amputation · Appreciate ID input · Appreciate cardiology input · Appreciate wound care input · Appreciate EP input; lead extraction attempted, resistance felt with gentle traction. Procedure rescheduled for Monday, 04/24/2020 Atrial fibrillation Hypercoagulable state secondary to afib HTN 
· Continue Cardizem, doxazosin, hydralazine, metoprolol, and warfarin CKD stage IV · Cr 1.52; improved from admission · Avoid nephrotoxins · Monitor Type 2 DM · BS AC&HS 
· SSI · Continue Lantus 10 units daily · Hgb A1c 7.5 (03/08/2020) Anemia of chronic disease · Hgb 7.9 · No s/s of active bleeding · Monitor Depression and anxiety · Continue sertraline and buspirone 30.0 - 39.9 Obese / Body mass index is 34.82 kg/m². Code status: Full Prophylaxis: Warfarin Recommended Disposition: Home w/Family Subjective: Chief Complaint / Reason for Physician Visit Follow-up cellulitis and sepsis. Discussed with RN events overnight. Review of Systems: 
Symptom Y/N Comments  Symptom Y/N Comments Fever/Chills n   Chest Pain n   
Poor Appetite n   Edema n   
Cough    Abdominal Pain Sputum    Joint Pain SOB/CHOW n   Pruritis/Rash Nausea/vomit    Tolerating PT/OT Diarrhea    Tolerating Diet y Constipation    Other Could NOT obtain due to:   
 
Objective: VITALS:  
Last 24hrs VS reviewed since prior progress note. Most recent are: 
Patient Vitals for the past 24 hrs: 
 Temp Pulse Resp BP SpO2  
04/24/20 1302 97.5 °F (36.4 °C) 63 16 133/56 98 % 04/24/20 1048 98.3 °F (36.8 °C) 60 18 156/66 97 % 04/24/20 0724 97.8 °F (36.6 °C) 66 16 128/76 96 % 04/24/20 0402 98.2 °F (36.8 °C) 63 18 133/55 97 % 04/23/20 2335 98.1 °F (36.7 °C) 79 18 129/64 97 % 04/23/20 2037 98.2 °F (36.8 °C) 71 18 157/58 99 % 04/23/20 1512 97.4 °F (36.3 °C) 74 18 196/86 97 % Intake/Output Summary (Last 24 hours) at 4/24/2020 1333 Last data filed at 4/24/2020 1248 Gross per 24 hour Intake 1190 ml Output 600 ml Net 590 ml PHYSICAL EXAM: 
General: No acute distress. Pleasant obese  female. EENT:  EOMI. Anicteric sclerae. MMM Resp:  Clear to auscultation, no wheezing or rales. No accessory muscle use CV:  RRR,  No edema GI:  Soft, Non distended, Non tender.  +Bowel sounds Neurologic:  Alert and oriented X 3, normal speech, Psych:   Good insight. Not anxious nor agitated Skin:  No rashes. No jaundice, left foot with dry and intact dressing Reviewed most current lab test results and cultures  YES Reviewed most current radiology test results   YES Review and summation of old records today    NO Reviewed patient's current orders and MAR    YES 
PMH/ reviewed - no change compared to H&P 
________________________________________________________________________ Care Plan discussed with: 
  Comments Patient x Family RN x Care Manager Consultant Multidiciplinary team rounds were held today with , nursing, pharmacist and clinical coordinator. Patient's plan of care was discussed; medications were reviewed and discharge planning was addressed. ________________________________________________________________________ Total NON critical care TIME:  25   Minutes Total CRITICAL CARE TIME Spent:   Minutes non procedure based Comments >50% of visit spent in counseling and coordination of care    
________________________________________________________________________ Av Beard NP Procedures: see electronic medical records for all procedures/Xrays and details which were not copied into this note but were reviewed prior to creation of Plan. LABS: 
I reviewed today's most current labs and imaging studies. Pertinent labs include: 
Recent Labs  
  04/23/20 0221 WBC 6.5 HGB 7.9*  
HCT 25.4*  
 Recent Labs  
  04/24/20 
0409 04/23/20 0221 04/22/20 
0117  138 136  
K 3.3* 3.6 3.7  108 109* CO2 23 22 19* * 101* 100 BUN 22* 27* 33* CREA 1.52* 1.54* 1.68* CA 8.3* 8.4* 8.4* INR 2.9* 3.1* 2.2* Signed: Tyree Portillo NP

## 2020-04-24 NOTE — PROGRESS NOTES
Lead extraction attempted. Resistance felt with gentle traction. Procedure will be rescheduled for Monday after vitamin k given over the weekend. Thank you for allowing me to participate in this patients care.  
 
Bernardino Alexander MD, Moni Garcia

## 2020-04-24 NOTE — ANESTHESIA POSTPROCEDURE EVALUATION
Procedure(s): RELOCATE 2 [facility] Remove Pacemaker Dual Leads. MAC, total IV anesthesia Anesthesia Post Evaluation Patient location during evaluation: PACU Note status: Adequate. Level of consciousness: responsive to verbal stimuli and sleepy but conscious Pain management: satisfactory to patient Airway patency: patent Anesthetic complications: no 
Cardiovascular status: acceptable Respiratory status: acceptable Hydration status: acceptable Comments: +Post-Anesthesia Evaluation and Assessment Patient: Cj Henning MRN: [id number]  SSN: [id number] YOB: 1960  Age: 60 y.o. Sex: female Cardiovascular Function/Vital Signs /56   Pulse 63   Temp 36.4 °C (97.5 °F)   Resp 16   Ht 5' 10\" (1.778 m)   Wt 110.1 kg (242 lb 11.2 oz)   SpO2 98%   Breastfeeding No   BMI 34.82 kg/m² Patient is status post Procedure(s): RELOCATE 2 [facility] Remove Pacemaker Dual Leads. Nausea/Vomiting: Controlled. Postoperative hydration reviewed and adequate. Pain: 
Pain Scale 1: Numeric (0 - 10) (04/24/20 1200) Pain Intensity 1: 0 (04/24/20 1200) Managed. Neurological Status: At baseline. Mental Status and Level of Consciousness: Arousable. Pulmonary Status:  
O2 Device: Nasal cannula (04/24/20 1302) Adequate oxygenation and airway patent. Complications related to anesthesia: None Post-anesthesia assessment completed. No concerns. Signed By: Eduardo Quintero MD  
 4/24/2020 Post anesthesia nausea and vomiting:  controlled Vitals Value Taken Time /53 4/24/2020  1:15 PM  
Temp Pulse 60 4/24/2020  1:17 PM  
Resp 20 4/24/2020  1:17 PM  
SpO2 97 % 4/24/2020  1:17 PM  
Vitals shown include unvalidated device data.

## 2020-04-24 NOTE — ADDENDUM NOTE
Addendum  created 04/24/20 0467 by Adelaide Allison, RN Utilization Review saved, Visit Navigator Flowsheet section accepted

## 2020-04-24 NOTE — PROGRESS NOTES
Marisela Rehman 371 Phone: [phone] Fax: [phone] Raymond Kilgore [address] [facility] [address] [place], 21 [facility] Jefferson Morales 35 [address] [place], [address] [place] [address] [facility] and Surgeon Podiatry Progress Note Assessment/Plan: 
There are still three options for the patient,  
1. Long course of IV antibiotics. Without removal of the source, there is risk of failure. 2. BKA left. Pt declines at this time 3. Chopart's amputation (amputation at Talo-navicular and calcaneal-cuboid joints) and permanent AFO. This would avoid a BKA, but wound continue to put pressure on the plantar left heel. Additionally, like the BKA this is outside the surgical scope podiatry has in [place] and would require either Vascular Sx or Orthopedics to be involved however patient is refusing any amputation at all.  
-continue wound care daily as patient is refusing amputation: Left plantar foot wound: daily, cleanse with povidone/betadine moistened 4x4. Apply betadine moist 4x4's and then dry dressing to wound. 
 Right plantar foot wound: daily, cleanse with dermal wound cleanser spray, apply a piece of silver Opticell-AG moistened with some cleanser spray and secure with dry dressing. 
  
Subjective: 
Patient is a 60 y.o.  female who is being seen for osteomyelitis left foot. HPI: 
Patient was seen back in  March 2020 for wound left foot. CT and bone biopsy were negative for osteomyelitis left 5th metatarsal at that time. Wound was debrided and amniotic graft placed at that time. Pt did not significantly improve despite antibiotics, Sx and wound care. Now is back in hospital for fever and cellulitis left foot.   Xray shows Soft tissue wound at the level of the third through fifth proximal 
metatarsals. Underlying cortical irregularity and lucency within the proximal 
third through fifth metatarsals may represent osteomyelitis. A CT scan was ordered which is not definitive for osteo but patient is unable to  Have MRI which would be a better study. History: 
Sepsis ([facility] [A41.9] Allergies Allergen Reactions  Ace Inhibitors Cough  Amlodipine Swelling  Avapro [Irbesartan] Unable to Obtain  Bactrim [Sulfamethoxazole-Trimethoprim] Other (comments) Sore mouth  Captopril Cough  Carvedilol Diarrhea  Contrast Agent [Iodine] Itching [address]  Morphine Nausea and Vomiting and Swelling  Penicillins Hives Did not tolerate cefepime 3/2020  Pravachol [Pravastatin] Nausea Only  Seafood [Shellfish Containing Products] Hives  Singulair [Montelukast] Other (comments)  
  palpitations Family History Problem Relation Age of Onset  Cancer Mother  Heart Disease Mother  Alcohol abuse Father  Diabetes Brother  Hypertension Brother Past Medical History:  
Diagnosis Date  Acquired lymphedema Right arm  Arrhythmia  Asthma  Atrial fibrillation ([facility] Dr. Argentina Bah and [facility])  Cancer ([facility] bilateral breast  
 Chronic kidney disease  Diabetes mellitus type II   
 Dizziness  Essential hypertension  Hyperlipidemia  Hypertension  Long term (current) use of anticoagulants  Morbid obesity ([facility] 3/14/2012  Nausea & vomiting  Neuropathy  Osteoarthritis  Pacemaker  Sick sinus syndrome ([facility] Past Surgical History:  
Procedure Laterality Date  ABDOMEN SURGERY PROC UNLISTED    
 gastric bypass  GASTRIC BYPASS,OBESE<150CM SAW-EN-Y  7/08  
 [facility]  HX AFIB ABLATION    
 HX BREAST RECONSTRUCTION Bilateral   
 HX CARPAL TUNNEL RELEASE [facility]  HX DILATION AND CURETTAGE [place] RIGHT MODIFIED RADICAL   
 HX MASTECTOMY Left   
 no lymphnode removal  
 HX MOHS PROCEDURES  1995  
 right  HX ORTHOPAEDIC Right   
 knee surgery  HX PACEMAKER    
 HX PACEMAKER    
 IR INSERT TUNL CVC W PORT OVER 5 YEARS    
 LAMINECTOMY,CERVICAL  1994  
 NEEDLE BIOPSY LIVER,W OTHR [address]  8.21.07  
 [place] [address] AND 1994  [place] TRABECULOPLASTY BY LASER SURGERY    
 US GUIDE ASP OVARIAN CYST ABD APPROACH  8.21.07  
 RIGHT Social History Tobacco Use  Smoking status: Never Smoker  Smokeless tobacco: Never Used Substance Use Topics  Alcohol use: Yes Comment: rare Social History Substance and Sexual Activity Alcohol Use Yes Comment: rare Social History Substance and Sexual Activity Drug Use No  
  
Social History Tobacco Use Smoking Status Never Smoker Smokeless Tobacco Never Used Current Facility-Administered Medications Medication Dose Route Frequency  [START ON 4/25/2020] Vanc Trough: 4/25 (Saturday) at 0100 (prior to 0200 dose)- RN please collect   Other ONCE  
 [START ON 4/25/2020] phytonadione (VITAMIN K) 1 mg/mL oral solution 5 mg  5 mg Oral DAILY  fentaNYL citrate (PF) injection 25-50 mcg  25-50 mcg IntraVENous Multiple  midazolam (VERSED) injection 0.5-2 mg  0.5-2 mg IntraVENous Multiple  lidocaine (XYLOCAINE) 2 % viscous solution 15 mL  15 mL Mouth/Throat PRN  
 vancomycin (VANCOCIN) 1,000 mg in 0.9% sodium chloride (MBP/ADV) 250 mL  1,000 mg IntraVENous Q24H  hydrALAZINE (APRESOLINE) 20 mg/mL injection 10 mg  10 mg IntraVENous Q6H PRN  
 dilTIAZem CD (CARDIZEM CD) capsule 180 mg  180 mg Oral DAILY  insulin glargine (LANTUS) injection 10 Units  10 Units SubCUTAneous DAILY  bumetanide (BUMEX) tablet 2 mg  2 mg Oral DAILY  busPIRone (BUSPAR) tablet 10 mg  10 mg Oral BID  doxazosin (CARDURA) tablet 4 mg  4 mg Oral QHS  hydrALAZINE (APRESOLINE) tablet 50 mg  50 mg Oral TID  metoprolol succinate (TOPROL-XL) XL tablet 200 mg  200 mg Oral DAILY  sertraline (ZOLOFT) tablet 50 mg  50 mg Oral DAILY  acetaminophen (TYLENOL) tablet 650 mg  650 mg Oral Q6H PRN  
 glucose chewable tablet 16 g  4 Tab Oral PRN  
 dextrose (D50W) injection syrg 12.5-25 g  25-50 mL IntraVENous PRN  
 glucagon (GLUCAGEN) injection 1 mg  1 mg IntraMUSCular PRN  
 insulin lispro (HUMALOG) injection   SubCUTAneous AC&HS  sodium chloride (NS) flush 5-10 mL  5-10 mL IntraVENous PRN Objective: 
Vitals:  
Patient Vitals for the past 12 hrs: 
 BP Temp Pulse Resp SpO2  
04/24/20 1500 140/54  60 18 97 % 04/24/20 1430 135/50  60 16 98 % 04/24/20 1345 144/56  60 18 96 % 04/24/20 1330 140/55  60 18 97 % 04/24/20 1325 138/54  60 20 97 % 04/24/20 1320 133/55  60 19 96 % 04/24/20 1315 140/53  60 20 97 % 04/24/20 1310 134/56  60 18 97 % 04/24/20 1305 137/55  60 20 96 % 04/24/20 1302 133/56 97.5 °F (36.4 °C) 63 16 98 % 04/24/20 1048 156/66 98.3 °F (36.8 °C) 60 18 97 % 04/24/20 0724 128/76 97.8 °F (36.6 °C) 66 16 96 % Left foot erythema, edema and calor. Wound lateral left mid-foot has mixed fibrogranular base with thin layer of soft tissue covering 5th metatarsal base. Wound plantar right heel is granular with surrounding hyperkeratosis DP and PT 2/4; CFT less than 3 seconds Sensation intact to light touch Flexible varus position left foot, +POP lateral left mid-foot 
  
Imaging / Cx / Px: EXAM: XR FOOT LT MIN 3 V 
  INDICATION: Swelling, redness. 
  
COMPARISON: March 18 
  
FINDINGS: Three views of the left foot demonstrate no fracture or other acute 
osseous or articular abnormality. There is evidence of chronic amputation of the 
second toe. The bones are osteopenic. There is midfoot DJD with an overlying 
soft tissue wound.  Irregularity and lucency in the proximal third, fourth, and 
fifth metatarsals is noted, which may represent the sequela of osteomyelitis. 
  
IMPRESSION IMPRESSION: Soft tissue wound at the level of the third through fifth proximal 
metatarsals. Underlying cortical irregularity and lucency within the proximal 
third through fifth metatarsals may represent osteomyelitis. INDICATION:  Wound left foot question osteomyelitis  
  EXAM: CT left foot. 4/19/2020. 3/8/2020. 
  
Thin section axial images were obtained. From these sagittal and coronal 
reformats were performed. CT dose reduction was achieved through use of a 
standardized protocol tailored for this examination and automatic exposure 
control for dose modulation.  
  
FINDINGS: There is a large soft tissue ulcer at the base of the fifth 
metatarsal. There is slight fragmentation of the adjacent bone with areas of 
subchondral cyst formation. All areas demonstrate normal cortical bone. There is 
edema within the soft tissues of the plantar aspect of the foot. Given lack of 
intravenous contrast no drainable fluid collection. Overall bone mineral density 
is decreased. Extensive vascular calcifications. No fracture 
  
IMPRESSION IMPRESSION: 
1. Large soft tissue ulcer at the base of the fifth metatarsal. No definite 
evidence of osteomyelitis but if clinical concern is high MRI imaging would 
better evaluate Culture result: Abnormal       Final  
LIGHT STAPHYLOCOCCUS AUREUS Culture result: Abnormal       Final  
SCANT PROTEUS MIRABILIS Culture result: Abnormal       Final  
HEAVY STREPTOCOCCI, BETA HEMOLYTIC GROUP B Penicillin and ampicillin are drugs of choice for treatment of beta-hemolytic streptococcal infections. Susceptibility testing of penicillins and beta-lactams approved by the FDA for treatment of beta-hemolytic streptococcal infections need not be performed routinely, because nonsusceptible isolates are extremely rare. CLSI 2012 Culture result: Abnormal       Final  
HEAVY STREPTOCOCCI, BETA HEMOLYTIC GROUP G Penicillin and ampicillin are drugs of choice for treatment of beta-hemolytic streptococcal infections. Susceptibility testing of penicillins and beta-lactams approved by the FDA for treatment of beta-hemolytic streptococcal infections need not be performed routinely, because nonsusceptible isolates are extremely rare. CLSI 2012 Labs: 
Recent Labs  
  04/24/20 
0409 04/23/20 
0221 WBC  --  6.5 CREA 1.52* 1.54* BUN 22* 27* * 101* HGB  --  7.9* HCT  --  25.4* INR 2.9* 3.1*  138  
K 3.3* 3.6  108 CO2 23 22

## 2020-04-24 NOTE — PROGRESS NOTES
Infectious Disease Progress IMPRESSION:  
 
- Sepsis - Bacteremia with  MSSA  
  BC - 4/19-  MSSA - 1/1 - LAC BC-  4/19-  MSSA - 1/1 - RAC BC-  4/21-  NG 
  ECHO - possible vegetation on AV Pacemaker present - no swelling , erythema MIKE - mobile vegetation on pacer lead - Cellulitis of LLE , recurrent Previous admissions for same - 3/8-3/22, 1/14-1/20 RLE cellulitis - 12/6-12/9/19 
 - Diabetic foot ulcer , OM of foot WC - 4/20- Light  MSSA,few Strep Gp G beta hemolytic Previous WC - 3/10- MSSA , Proteus mirabilis Xray - 
: Soft tissue wound at the level of the third through fifth proximal 
metatarsals. Underlying cortical irregularity and lucency within the proximal 
third through fifth metatarsals may represent osteomyelitis. - CKD 4 Cr 1.54 
-Atial fibrillation - ESR / CRP 85/14.4  
- Diabetes mellitus with neuropathy A1c 7.5 
- Penicillin allergy - hives PLAN:  
  
 -  Continue Vancomycin/ / Flagyl pending anaerobic cultures, DC Levaquin - Pharmacy on Consult for antibiotic dosing, target Vanc  trough 15 - Monitor Cr, Vanc trough - Pt will need removal of pacer , 6 weeks of MSSA  therapy  
 -  CT scan of foot to better evaluate extent of OM 
  -Recommend Vascular evaluation , D/w Podiatry - they will not be able to do amputation - Chopart's Or BKA . Necrotic foot wound likely source of bacteremia & subsequent  seeding 
  -D/w pt at length, all questions answered. Subjective:  
 
Pt seen . Denies new complaints Patient Active Problem List  
Diagnosis Code  History of breast cancer Z85.3  Asthma J45.909  Fe deficiency anemia D50.9  Status post ablation of atrial fibrillation Z98.890, Z86.79  
 Sick sinus syndrome (HCC) I49.5  S/P cardiac pacemaker procedure Z95.0  Long term (current) use of anticoagulants Z79.01  
 Mixed hyperlipidemia E78.2  CKD (chronic kidney disease), stage IV (HCC) N18.4  Systolic murmur O47.8  Essential hypertension I10  
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  
 Diabetic ulcer of right midfoot associated with type 2 diabetes mellitus, with fat layer exposed ([address] E11.621, M20.038  Foot deformity, acquired, left M21.962  Foot deformity, right M21.961  Pes cavus Q66.70  Type 2 diabetes mellitus with left diabetic foot infection ([facility]) E11.628, L08.9  Traumatic hematoma of foot, left, initial encounter G07.04OW  Diabetic ulcer of left midfoot with necrosis of muscle ([address] E11.621, A08.200  Left leg cellulitis L03. 80  Atrial fibrillation with RVR ([facility]) I48.91  
 H/O bilateral mastectomy Z90.13  Sepsis ([address] A41.9 Past Medical History:  
Diagnosis Date  Acquired lymphedema Right arm  Arrhythmia  Asthma  Atrial fibrillation ([address] Dr. Sofia Cosme and Dr. Jolie Sifuentes [facility])  Cancer ([address] bilateral breast  
 Chronic kidney disease  Diabetes mellitus type II   
 Dizziness  Essential hypertension  Hyperlipidemia  Hypertension  Long term (current) use of anticoagulants  Morbid obesity ([address] 3/14/2012  Nausea & vomiting  Neuropathy  Osteoarthritis  Pacemaker  Sick sinus syndrome ([facility] Family History Problem Relation Age of Onset  Cancer Mother  Heart Disease Mother  Alcohol abuse Father  Diabetes Brother  Hypertension Brother Social History Tobacco Use  Smoking status: Never Smoker  Smokeless tobacco: Never Used Substance Use Topics  Alcohol use: Yes Comment: rare Past Surgical History:  
Procedure Laterality Date  ABDOMEN SURGERY PROC UNLISTED    
 gastric bypass  GASTRIC BYPASS,OBESE<150CM SAW-EN-Y  7/08  
 [facility]  HX AFIB ABLATION    
 HX BREAST RECONSTRUCTION Bilateral   
 HX CARPAL TUNNEL RELEASE [facility] [address] RIGHT MODIFIED RADICAL   
 HX MASTECTOMY Left   
 no lymphnode removal  
 HX MOHS PROCEDURES  1995  
 right  HX ORTHOPAEDIC Right   
 knee surgery  HX PACEMAKER    
 HX PACEMAKER    
 IR INSERT TUNL CVC W PORT OVER 5 YEARS    
 LAMINECTOMY,CERVICAL  1994  
 NEEDLE BIOPSY LIVER,W OTHR [address]  8.21.07  
 [place] [address] [address] AND 1994  [place] TRABECULOPLASTY BY LASER SURGERY    
 US GUIDE ASP OVARIAN CYST ABD APPROACH  8.21.07  
 RIGHT Prior to Admission medications Medication Sig Start Date End Date Taking? Authorizing Provider  
metOLazone (ZAROXOLYN) 2.5 mg tablet Take 1 tablet po on Mon and Thurs for worsening leg swelling. 4/13/20  Yes Latasha Reardon MD  
warfarin (COUMADIN) 4 mg tablet Take 1 tablet on Tues and Sat and a half tablet the other 5 days. 4/10/20  Yes Latasha Reardon MD  
bumetanide (BUMEX) 1 mg tablet Take 2 Tabs by mouth daily. 3/26/20  Yes Latasha Reardon MD  
insulin glargine (Lantus U-100 Insulin) 100 unit/mL injection Inject 14 units daily 3/26/20  Yes Latasha Reardon MD  
metoprolol succinate (TOPROL-XL) 100 mg tablet Take 2 Tabs by mouth daily.  3/26/20  Yes Latasha Reardon MD  
dilTIAZem CD (CARDIZEM CD) 180 mg ER capsule Take 1 Cap by mouth daily. 3/22/20  Yes Shanna Morin MD  
hydrALAZINE (APRESOLINE) 50 mg tablet Take 1 Tab by mouth three (3) times daily. 3/22/20  Yes Shanna Morin MD  
acetaminophen 500 mg tab 500 mg, diphenhydrAMINE 25 mg cap 25 mg Take 2 Doses by mouth nightly. Yes Provider, Historical  
sertraline (ZOLOFT) 50 mg tablet TAKE ONE AND ONE-HALF (1 & 1/2) TABLET BY MOUTH DAILY 8/22/19  Yes Shila Lobo MD  
doxazosin (CARDURA) 4 mg tablet TAKE ONE TABLET BY MOUTH ONCE NIGHTLY 6/14/19  Yes Shila Lobo MD  
busPIRone (BUSPAR) 10 mg tablet TAKE ONE TABLET BY MOUTH TWICE A DAY 5/24/19  Yes Shila Lobo MD  
cholecalciferol, VITAMIN D3, (VITAMIN D3) 5,000 unit tab tablet Take 5,000 Units by mouth daily. Yes Provider, Historical  
vitamin A 8,000 unit capsule Take 8,000 Units by mouth daily. Yes Provider, Historical  
insulin lispro (HUMALOG) 100 unit/mL kwikpen 2 units with dinner for sugars over 200 1/3/14  Yes Shila Lobo MD  
cyanocobalamin (VITAMIN B-12) 1,000 mcg sublingual tablet Take 1,000 mcg by mouth daily. Yes Provider, Historical  
 
Allergies Allergen Reactions  Ace Inhibitors Cough  Amlodipine Swelling  Avapro [Irbesartan] Unable to Obtain  Bactrim [Sulfamethoxazole-Trimethoprim] Other (comments) Sore mouth  Captopril Cough  Carvedilol Diarrhea  Contrast Agent [Iodine] Itching [address]  Morphine Nausea and Vomiting and Swelling  Penicillins Hives Did not tolerate cefepime 3/2020  Pravachol [Pravastatin] Nausea Only  Seafood [Shellfish Containing Products] Hives  Singulair [Montelukast] Other (comments)  
  palpitations Review of Systems:  A comprehensive review of systems was negative except for that written in the History of Present Illness. 10 point review of systems obtained . All other systems negative Objective:  
Blood pressure 157/58, pulse 71, temperature 98.2 °F (36.8 °C), resp. rate 18, height 5' 10\" (1.778 m), weight 248 lb 1.6 oz (112.5 kg), SpO2 99 %, not currently breastfeeding. Temp (24hrs), Av °F (36.7 °C), Min:97.4 °F (36.3 °C), Max:98.4 °F (36.9 °C) Current Facility-Administered Medications Medication Dose Route Frequency  fentaNYL citrate (PF) injection 25-50 mcg  25-50 mcg IntraVENous Multiple  midazolam (VERSED) injection 0.5-2 mg  0.5-2 mg IntraVENous Multiple  lidocaine (XYLOCAINE) 2 % viscous solution 15 mL  15 mL Mouth/Throat PRN  
 [START ON 2020] vancomycin (VANCOCIN) 1,000 mg in 0.9% sodium chloride (MBP/ADV) 250 mL  1,000 mg IntraVENous Q24H  hydrALAZINE (APRESOLINE) 20 mg/mL injection 10 mg  10 mg IntraVENous Q6H PRN  
 dilTIAZem CD (CARDIZEM CD) capsule 180 mg  180 mg Oral DAILY  insulin glargine (LANTUS) injection 10 Units  10 Units SubCUTAneous DAILY  bumetanide (BUMEX) tablet 2 mg  2 mg Oral DAILY  busPIRone (BUSPAR) tablet 10 mg  10 mg Oral BID  doxazosin (CARDURA) tablet 4 mg  4 mg Oral QHS  hydrALAZINE (APRESOLINE) tablet 50 mg  50 mg Oral TID  metoprolol succinate (TOPROL-XL) XL tablet 200 mg  200 mg Oral DAILY  sertraline (ZOLOFT) tablet 50 mg  50 mg Oral DAILY  acetaminophen (TYLENOL) tablet 650 mg  650 mg Oral Q6H PRN  
 glucose chewable tablet 16 g  4 Tab Oral PRN  
 dextrose (D50W) injection syrg 12.5-25 g  25-50 mL IntraVENous PRN  
 glucagon (GLUCAGEN) injection 1 mg  1 mg IntraMUSCular PRN  
 insulin lispro (HUMALOG) injection   SubCUTAneous AC&HS  WARFARIN INFORMATION NOTE (COUMADIN)   Other QPM  
 metroNIDAZOLE (FLAGYL) IVPB premix 500 mg  500 mg IntraVENous Q12H  
 sodium chloride (NS) flush 5-10 mL  5-10 mL IntraVENous PRN Exam:   
General:  Awake cooperative, Eyes:  Sclera anicteric. Pupils equally round and reactive to light. Mouth/Throat: Mucous membranes normal, oral pharynx clear Neck: Supple Lungs:   Clear to auscultation CV:  Regular rate and rhythm,no murmur, click, rub or gallop Abdomen:   Soft, non-tender. bowel sounds normal. non-distended Extremities: No cyanosis or edema Skin: Skin color, texture, turgor normal. no acute rash or lesions Lymph nodes: Cervical and supraclavicular normal  
Musculoskeletal: LLE swelling, warm, foot dressing + Lines/Devices:  Intact, no erythema, drainage or tenderness Psych: Alert and oriented, normal mood affect Data Review: CBC:  
Recent Labs  
  04/23/20 0221 04/21/20 
0533 WBC 6.5 10.2 RBC 2.75* 2.85* HGB 7.9* 8.1* HCT 25.4* 25.9*  
 333 CMP:  
Recent Labs  
  04/23/20 0221 04/22/20 
0117 04/21/20 
0533 * 100 112*  136 134* K 3.6 3.7 3.7  109* 107 CO2 22 19* 18*  
BUN 27* 33* 40* CREA 1.54* 1.68* 1.89* CA 8.4* 8.4* 8.6 AGAP 8 8 9 BUCR 18 20 21* Lab Results Component Value Date/Time Culture result: NO GROWTH 2 DAYS 04/21/2020 08:50 AM  
 Culture result: CHECKING FOR POSSIBLE ANAEROBE 
 04/21/2020 07:20 AM  
 Culture result: LIGHT STAPHYLOCOCCUS AUREUS (A) 04/20/2020 05:48 PM  
 Culture result: (A) 04/20/2020 05:48 PM  
  FEW STREPTOCOCCI, BETA HEMOLYTIC GROUP G Penicillin and ampicillin are drugs of choice for treatment of beta-hemolytic streptococcal infections. Susceptibility testing of penicillins and beta-lactams approved by the FDA for treatment of beta-hemolytic streptococcal infections need not be performed routinely, because nonsusceptible isolates are extremely rare. CLSI 2012 Culture result: NO ANAEROBES ISOLATED 04/20/2020 05:48 PM  
  
 
 
XR Results (most recent): 
Results from Hospital Encounter encounter on 04/19/20 XR TIB/FIB LT Narrative EXAM: XR TIB/FIB LT 
 
INDICATION: swelling, assess for gas. COMPARISON: None. FINDINGS: AP and lateral  views of the left tibia and fibula demonstrate no 
fracture or dislocation.  There is generalized edema and vascular calcifications 
and there are degenerative changes of the knee and ankle. There is no soft 
tissue gas. Impression IMPRESSION: Generalized edema. No soft tissue gas. ICD-10-CM ICD-9-CM 1. Ulcer of left foot, unspecified ulcer stage ([facility] L97.529 707.15   
2. Osteomyelitis of left foot, unspecified type ([facility] M86.9 730.27 3. Cellulitis of left lower extremity L03.116 682. 6 Antibiotic History I have discussed the diagnosis with the patient and the intended plan as seen in the above orders. I have discussed medication side effects and warnings with the patient as well. Reviewed test results at length with patient Signed By: Jannie Cid MD FACP

## 2020-04-24 NOTE — ANESTHESIA PREPROCEDURE EVALUATION
Relevant Problems No relevant active problems Anesthetic History PONV Review of Systems / Medical History Patient summary reviewed, nursing notes reviewed and pertinent labs reviewed Pulmonary Asthma Neuro/Psych Within defined limits Cardiovascular Hypertension Valvular problems/murmurs: mitral insufficiency CHF Dysrhythmias : atrial fibrillation Pacemaker (PM-sss) and hyperlipidemia Exercise tolerance: >4 METS Comments: Sepsis secondary to left foot osteomyelitis with positive blood cultures and demonstrated vegetation on pacemaker lead by MIKE on 4/23/20. S/P A-fib ablation (2012) Sick Sinus Syndrome MIKE (4/23/20): ·Normal cavity size, wall thickness, systolic function (ejection fraction normal) and diastolic function. Estimated left ventricular ejection fraction is 55 - 60%. No regional wall motion abnormality noted. Normal left ventricular strain. ·Lambl's excrescences visualized on the valve. ·Mitral valve non-specific thickening. Mild to moderate mitral valve regurgitation is present GI/Hepatic/Renal 
  
 
 
Renal disease: CRI Comments: S/P Gastric bypass CRI, Stage IV Endo/Other Diabetes: well controlled, type 2, using insulin Obesity, arthritis and cancer (breast) Pertinent negatives: No morbid obesity Other Findings Comments: Sepsis Non-pressure chronic ulcer of left foot Left foot osteomyelitis Right arm lymphedema Neck and back surgery Physical Exam 
 
Airway Mallampati: II 
TM Distance: 4 - 6 cm Neck ROM: normal range of motion Mouth opening: Normal 
 
 Cardiovascular Regular rate and rhythm,  S1 and S2 normal,  no murmur, click, rub, or gallop Dental 
 
Dentition: Poor dentition and Upper partial plate Comments: Several missing lower teeth, none loose. Pulmonary Breath sounds clear to auscultation Abdominal 
GI exam deferred Other Findings Anesthetic Plan ASA: 3 Anesthesia type: MAC and total IV anesthesia Induction: Intravenous Anesthetic plan and risks discussed with: Patient

## 2020-04-24 NOTE — ADDENDUM NOTE
Addendum  created 04/24/20 1619 by Boris Dewey RN Utilization Review saved, Visit Navigator Flowsheet section accepted

## 2020-04-24 NOTE — PROGRESS NOTES
Pharmacy Automatic Renal Dosing Protocol - Antimicrobials Indication for Antimicrobials: Left foot cellulitis and osteomyelitis Current Regimen of Each Antimicrobial: 
Vancomycin 1250 mg IV every 24 hours (Start Date ; Day # 5) Levofloxacin 750 mg IV every 48 hours (Start Date ; Day # 5) Metronidazole 500 mg IV every 12 hours (Start Date ; Day # 5) Previous Antimicrobial Therapy: 
Cefepime 1 g IV every 12 hours (Start Date ; Day # 2) Goal Level: VANCOMYCIN TROUGH GOAL RANGE Vancomycin Trough: 15 - 20 mcg/mL  (AUC: 400 - 600 mg/hr/Liter/day) Date Dose & Interval Measured (mcg/mL) Extrapolated (mcg/mL)  
 @ 22:38 1250 mg Q24H 20.6 20.5 Date & time of next level:  at 0100 Significant Cultures:  
 Blood: GPC in clusters in - final (S to oxacilin), MSSA  Wound: light staph aureus - pending : blood - NGTD - pending Radiology / Imaging results: (X-ray, CT scan or MRI):  
 Lt Foot: Soft tissue wound at the level of the third through fifth proximal metatarsals. Underlying cortical irregularity and lucency within the proximal third through fifth metatarsals may represent osteomyelitis Paralysis, amputations, malnutrition: None Labs: 
Recent Labs  
  20 
0221 20 
0117 20 
0533 CREA 1.54* 1.68* 1.89* BUN 27* 33* 40* WBC 6.5  --  10.2 Temp (24hrs), Av °F (36.7 °C), Min:97 °F (36.1 °C), Max:98.4 °F (36.9 °C) Creatinine Clearance (mL/min) or Dialysis: 43 mL/min Impression/Plan:  
Continue vancomycin 1000mg IV every 24hr Vancomycin trough  at 0100 Please consider changing stop date as appropriate for indication of osteomyelitis (informed Kymberly ROD), ID now consulted. Note new dose entered w/o a stop date Per Kymberly's note from today, \"Appreciate podiatry input; patient wants to try a long course of IV antibiotics vs amputation\" Antimicrobial stop date pending anaerobic culture finalizing Pharmacy will follow daily and adjust medications as appropriate for renal function and/or serum levels. Thank you, Jonathan Chavira PHARMD 
 
Recommended duration of therapy 
http://[facility]/[place]/[facility]/Pharmacy/Clinical%20Companion/Duration%20of%20ABX%20therapy. docx Renal Dosing 
http://[facility]/[place]/[facility]/Pharmacy/Clinical%20Companion/Renal%[email or web address]. pdf

## 2020-04-24 NOTE — PROGRESS NOTES
Pt to go down for pacemaker removal today. Dr and Anesthesiologist reviewed procedure. Pt will tx to room 2157 after procedure. Report called to [facility]. Hx, wounds, labs, SBAR and kardex reviewed.

## 2020-04-24 NOTE — CONSULTS
Subjective:  
  
           
[address], [place], [address] [place] [address]  [phone] Date of  Admission: 4/19/2020  9:03 PM  
 
Admission type:Emergency Grisel Sanchez is a 60 y.o. female admitted for Sepsis ([address] [A41.9]. Pt with left foot osteomyelitis. Positive blood cultures. Ovi demonstrated vegetation on pacer lead. Asked to see regarding that. She is sp af abl and then had sick sinus syndrome with pacemaker implanted in 2012. She denies cp Patient Active Problem List  
 Diagnosis Date Noted  Sepsis ([address] 04/20/2020  H/O bilateral mastectomy 03/26/2020  Left leg cellulitis 03/08/2020  Atrial fibrillation with RVR ([address] 03/08/2020  Diabetic ulcer of left midfoot with necrosis of muscle ([address] 03/03/2020  Traumatic hematoma of foot, left, initial encounter 02/25/2020  Type 2 diabetes mellitus with left diabetic foot infection ([address] 10/29/2019  Foot deformity, acquired, left 09/24/2019  Foot deformity, right 09/24/2019  Pes cavus 09/24/2019  Diabetic ulcer of right midfoot associated with type 2 diabetes mellitus, with fat layer exposed ([address] 08/06/2019  Diabetic ulcer of left heel associated with type 2 diabetes mellitus, with fat layer exposed ([address] 06/21/2019  Venous stasis ulcer of right lower leg with edema of right lower leg (HCC) 11/14/2018  Diabetic polyneuropathy associated with type 2 diabetes mellitus ([address] 11/13/2018  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, associated with type 2 diabetes mellitus ([address] 04/25/2018  Morbid obesity with BMI of 40.0-44.9, adult ([address] 05/01/2017  Controlled type 2 diabetes mellitus with stage 4 chronic kidney disease, with long-term current use of insulin ([address] 01/16/2017  PAF (paroxysmal atrial fibrillation) ([address] 11/28/2016  Anemia in stage 4 chronic kidney disease ([address] 11/28/2016  Essential hypertension 08/26/2016  Systolic murmur 43/92/2269  CKD (chronic kidney disease), stage IV ([facility] 06/09/2012  Mixed hyperlipidemia 05/22/2012  Long term (current) use of anticoagulants 04/11/2012  S/P cardiac pacemaker procedure 04/03/2012  Sick sinus syndrome ([facility] 04/02/2012  Status post ablation of atrial fibrillation 12/15/2011  Fe deficiency anemia 04/14/2010  
 History of breast cancer 04/13/2010  Asthma 04/13/2010 Ayla Rey MD 
Past Medical History:  
Diagnosis Date  Acquired lymphedema Right arm  Arrhythmia  Asthma  Atrial fibrillation ([facility] Dr. Argentina Bah and Dr. Jesse alves [facility])  Cancer ([facility] bilateral breast  
 Chronic kidney disease  Diabetes mellitus type II   
 Dizziness  Essential hypertension  Hyperlipidemia  Hypertension  Long term (current) use of anticoagulants  Morbid obesity ([facility] 3/14/2012  Nausea & vomiting  Neuropathy  Osteoarthritis  Pacemaker  Sick sinus syndrome ([facility] Past Surgical History:  
Procedure Laterality Date  ABDOMEN SURGERY PROC UNLISTED    
 gastric bypass  GASTRIC BYPASS,OBESE<150CM SAW-EN-Y  7/08  
 [facility]  HX AFIB ABLATION    
 HX BREAST RECONSTRUCTION Bilateral   
 HX CARPAL TUNNEL RELEASE [address] [id number] [address] RIGHT MODIFIED RADICAL   
 HX MASTECTOMY Left   
 no lymphnode removal  
 HX MOHS PROCEDURES  1995  
 right  HX ORTHOPAEDIC Right   
 knee surgery  HX PACEMAKER    
 HX PACEMAKER    
 IR INSERT TUNL CVC W PORT OVER 5 YEARS    
 LAMINECTOMY,CERVICAL  1994  
 NEEDLE BIOPSY LIVER,W OTHR [address]  8.21.07  
 MA [address] AND 1994  MA TRABECULOPLASTY BY LASER SURGERY    
 US GUIDE ASP OVARIAN CYST ABD APPROACH  8.21.07  
 RIGHT Allergies Allergen Reactions  Ace Inhibitors Cough  Amlodipine Swelling  Avapro [Irbesartan] Unable to Obtain  Bactrim [Sulfamethoxazole-Trimethoprim] Other (comments) Sore mouth  Captopril Cough  Carvedilol Diarrhea  Contrast Agent [Iodine] Itching [address]  Morphine Nausea and Vomiting and Swelling  Penicillins Hives Did not tolerate cefepime 3/2020  Pravachol [Pravastatin] Nausea Only  Seafood [Shellfish Containing Products] Hives  Singulair [Montelukast] Other (comments)  
  palpitations Social History Tobacco Use  Smoking status: Never Smoker  Smokeless tobacco: Never Used Substance Use Topics  Alcohol use: Yes Comment: rare  Drug use: No  
 
Family History Problem Relation Age of Onset  Cancer Mother  Heart Disease Mother  Alcohol abuse Father  Diabetes Brother  Hypertension Brother Current Facility-Administered Medications Medication Dose Route Frequency  potassium chloride SR (KLOR-CON 10) tablet 40 mEq  40 mEq Oral NOW  
 [START ON 4/25/2020] Vanc Trough: 4/25 (Saturday) at 0100 (prior to 0200 dose)- RN please collect   Other ONCE  
 fentaNYL citrate (PF) injection 25-50 mcg  25-50 mcg IntraVENous Multiple  midazolam (VERSED) injection 0.5-2 mg  0.5-2 mg IntraVENous Multiple  lidocaine (XYLOCAINE) 2 % viscous solution 15 mL  15 mL Mouth/Throat PRN  
 vancomycin (VANCOCIN) 1,000 mg in 0.9% sodium chloride (MBP/ADV) 250 mL  1,000 mg IntraVENous Q24H  hydrALAZINE (APRESOLINE) 20 mg/mL injection 10 mg  10 mg IntraVENous Q6H PRN  
 dilTIAZem CD (CARDIZEM CD) capsule 180 mg  180 mg Oral DAILY  insulin glargine (LANTUS) injection 10 Units  10 Units SubCUTAneous DAILY  bumetanide (BUMEX) tablet 2 mg  2 mg Oral DAILY  busPIRone (BUSPAR) tablet 10 mg  10 mg Oral BID  doxazosin (CARDURA) tablet 4 mg  4 mg Oral QHS  hydrALAZINE (APRESOLINE) tablet 50 mg  50 mg Oral TID  metoprolol succinate (TOPROL-XL) XL tablet 200 mg  200 mg Oral DAILY  sertraline (ZOLOFT) tablet 50 mg  50 mg Oral DAILY  acetaminophen (TYLENOL) tablet 650 mg  650 mg Oral Q6H PRN  
 glucose chewable tablet 16 g  4 Tab Oral PRN  
 dextrose (D50W) injection syrg 12.5-25 g  25-50 mL IntraVENous PRN  
 glucagon (GLUCAGEN) injection 1 mg  1 mg IntraMUSCular PRN  
 insulin lispro (HUMALOG) injection   SubCUTAneous AC&HS  
 metroNIDAZOLE (FLAGYL) IVPB premix 500 mg  500 mg IntraVENous Q12H  
 sodium chloride (NS) flush 5-10 mL  5-10 mL IntraVENous PRN Review of Symptoms: 
Constitutional: negative Eyes: negative Ears, nose, mouth, throat, and face: negative Respiratory: negative Cardiovascular: negative Gastrointestinal: negative Genitourinary: negative Musculoskeletal: left foot osteo Neurological: negative Behvioral/Psych: negative Endocrine: negative Subjective:  
  
Visit Vitals /76 (BP 1 Location: Left arm, BP Patient Position: At rest) Pulse 66 Temp 97.8 °F (36.6 °C) Resp 16 Ht 5' 10\" (1.778 m) Wt 242 lb 11.2 oz (110.1 kg) SpO2 96% Breastfeeding No  
BMI 34.82 kg/m² Physical Exam 
Abdomen: soft, non-tender. Extremities: left foot osteomyelitis Heart: regular rate and rhythm Lungs: clear to auscultation bilaterally Pulses: 2+ and symmetric Cardiographics Telemetry: nsr 
 
ECG: nsr 
 
Echocardiogram: vegetation on pacemaker lead Labs: 
Recent Labs  
  04/23/20 
0221 WBC 6.5 HGB 7.9*  
HCT 25.4*  
 Recent Labs  
  04/24/20 
0409 04/23/20 
0221 04/22/20 
0117  138 136  
K 3.3* 3.6 3.7  108 109* CO2 23 22 19* * 101* 100 BUN 22* 27* 33* CREA 1.52* 1.54* 1.68* CA 8.3* 8.4* 8.4* INR 2.9* 3.1* 2.2* No results for input(s): TROIQ, CPK, CKMB in the last 72 hours. Intake/Output Summary (Last 24 hours) at 4/24/2020 1000 Last data filed at 4/24/2020 9315 Gross per 24 hour Intake 790 ml Output 600 ml Net 190 ml  Assessment: 
 
 Assessment:  
  
 Active Problems: 
  Sick sinus syndrome ([address] (4/2/2012) Overview: With 5 second and greater pauses, dual chamber pacemaker placement and  
    loop recorder explant done>[employer] device Diabetic ulcer of left heel associated with type 2 diabetes mellitus, with fat layer exposed ([address] (6/21/2019) Left leg cellulitis (3/8/2020) Sepsis ([address] (4/20/2020) Plan:  
 
Michael Hernandez is presenting with sepsis secondary to left foot osteo now with vegetation on pacemaker lead. She is a candidate for a pacemaker lead extraction/device removal. I discussed the risks/benefits/alternatives of the procedure with the patient. Risks include (but are not limited to) bleeding, heart block, infection, cva/mi/tamponade/death. The patient understands that she is at increased risk of mortality and hemorrhage and agrees to proceed. Thank you for this interesting consultation. Lennie Hameed MD, [facility] 
 
4/24/2020

## 2020-04-25 NOTE — PROGRESS NOTES
4/25/2020 4:14 PM 
 
Admit Date: 4/19/2020 Admit Diagnosis:  
Sepsis ([facility] [A41.9] Subjective:  
 
Kylie Recinos denies chest pain or shortness of breath. Current Facility-Administered Medications Medication Dose Route Frequency  [START ON 4/26/2020] vancomycin (VANCOCIN) 1250 mg in  ml infusion  1,250 mg IntraVENous Q36H  
 [START ON 4/26/2020] Vancomycin trough prior to dose due 4/26 at 1200   Other ONCE  phytonadione (VITAMIN K) 1 mg/mL oral solution 5 mg  5 mg Oral DAILY  fentaNYL citrate (PF) injection 25-50 mcg  25-50 mcg IntraVENous Multiple  midazolam (VERSED) injection 0.5-2 mg  0.5-2 mg IntraVENous Multiple  lidocaine (XYLOCAINE) 2 % viscous solution 15 mL  15 mL Mouth/Throat PRN  
 hydrALAZINE (APRESOLINE) 20 mg/mL injection 10 mg  10 mg IntraVENous Q6H PRN  
 dilTIAZem CD (CARDIZEM CD) capsule 180 mg  180 mg Oral DAILY  insulin glargine (LANTUS) injection 10 Units  10 Units SubCUTAneous DAILY  bumetanide (BUMEX) tablet 2 mg  2 mg Oral DAILY  busPIRone (BUSPAR) tablet 10 mg  10 mg Oral BID  doxazosin (CARDURA) tablet 4 mg  4 mg Oral QHS  hydrALAZINE (APRESOLINE) tablet 50 mg  50 mg Oral TID  metoprolol succinate (TOPROL-XL) XL tablet 200 mg  200 mg Oral DAILY  sertraline (ZOLOFT) tablet 50 mg  50 mg Oral DAILY  acetaminophen (TYLENOL) tablet 650 mg  650 mg Oral Q6H PRN  
 glucose chewable tablet 16 g  4 Tab Oral PRN  
 dextrose (D50W) injection syrg 12.5-25 g  25-50 mL IntraVENous PRN  
 glucagon (GLUCAGEN) injection 1 mg  1 mg IntraMUSCular PRN  
 insulin lispro (HUMALOG) injection   SubCUTAneous AC&HS  sodium chloride (NS) flush 5-10 mL  5-10 mL IntraVENous PRN Objective:  
  
Physical Exam:   
Visit Vitals /51 (BP 1 Location: Left arm, BP Patient Position: At rest) Pulse (!) 1 Temp 98.4 °F (36.9 °C) Resp 17 Ht 5' 10\" (1.778 m) Wt 242 lb 11.2 oz (110.1 kg) SpO2 100% Breastfeeding No  
BMI 34.82 kg/m² Gen:  NAD Mental Status - Alert. General Appearance - Not in acute distress. Chest and Lung Exam  
Inspection: Accessory muscles - No use of accessory muscles in breathing. Auscultation:  
Breath sounds: - Normal.  
Cardiovascular Inspection: Jugular vein - Bilateral - Inspection Normal.  
Palpation/Percussion:  
Apical Impulse: - Normal.  
Auscultation: Rhythm - Regular. Heart Sounds - S1 WNL and S2 WNL. No S3 or S4. Murmurs & Other Heart Sounds: Auscultation of the heart reveals - No Murmurs. Peripheral Vascular Upper Extremity: Inspection - Bilateral - No Cyanotic nailbeds or Digital clubbing. Lower Extremity:  
Palpation: Edema - Bilateral - No edema. Abdomen:   Soft, non-tender, bowel sounds are active. Neuro: A&O times 3, CN and motor grossly WNL Data Review:  
Recent Labs  
  04/25/20 0053 04/23/20 
0221 WBC 8.6 6.5 HGB 7.6* 7.9*  
HCT 24.3* 25.4*  
 359 Recent Labs  
  04/25/20 0053 04/24/20 
0409 04/23/20 0221  138 138  
K 3.7 3.3* 3.6 * 108 108 CO2 22 23 22 * 133* 101* BUN 18 22* 27* CREA 1.38* 1.52* 1.54* CA 8.3* 8.3* 8.4* INR 2.6* 2.9* 3.1* No results for input(s): TROIQ, CPK, CKMB in the last 72 hours. Intake/Output Summary (Last 24 hours) at 4/25/2020 1614 Last data filed at 4/25/2020 1549 Gross per 24 hour Intake 240 ml Output 2500 ml Net -2260 ml Telemetry: paced Assessment:  
 
Active Problems: 
  Sick sinus syndrome ([address] (4/2/2012) Overview: With 5 second and greater pauses, dual chamber pacemaker placement and  
    loop recorder explant done>[employer] device Diabetic ulcer of left heel associated with type 2 diabetes mellitus, with fat layer exposed ([address] (6/21/2019) Left leg cellulitis (3/8/2020) Sepsis ([address] (4/20/2020) Plan:  
 
Layton Hale is presenting with sepsis secondary to left foot osteo now with vegetation on pacemaker lead.  She is a candidate for a pacemaker lead extraction/device removal. Dr. Kareen Mckinney discussed the risks/benefits/alternatives of the procedure with the patient. Risks include (but are not limited to) bleeding, heart block, infection, cva/mi/tamponade/death. The patient understands that she is at increased risk of mortality and hemorrhage and agrees to proceed. Aborted attempt Friday due to slight resistance felt with attempt at removal.  Rechecduled for Monday after Vitamin K.  Dr. Kareen Mckinney will see again on Monday. Please call if questions in the meantime.

## 2020-04-25 NOTE — PROGRESS NOTES
Infectious Disease Progress IMPRESSION:  
 
- Sepsis - Bacteremia with  MSSA  
  BC - 4/19-  MSSA - 1/1 - LAC BC-  4/19-  MSSA - 1/1 - RAC BC-  4/21-  NG 
  ECHO - possible vegetation on AV Pacemaker present - no swelling , erythema MIKE - mobile vegetation on pacer lead Lead extraction attempted unsuccessful For extraction on Monday - Cellulitis of LLE , recurrent Previous admissions for same - 3/8-3/22, 1/14-1/20 RLE cellulitis - 12/6-12/9/19 
 - Diabetic foot ulcer , OM of foot CT L/foot - no definite evidence of OM 
  WC - 4/20- Light  MSSA,few Strep Gp G beta hemolytic Previous WC - 3/10- MSSA , Proteus mirabilis Xray - 
: Soft tissue wound at the level of the third through fifth proximal 
metatarsals. Underlying cortical irregularity and lucency within the proximal 
third through fifth metatarsals may represent osteomyelitis. - CKD 4 Cr 1.54 
-Atial fibrillation - ESR / CRP 85/14.4  
- Diabetes mellitus with neuropathy A1c 7.5 
- Penicillin allergy - hives PLAN:  
  
 -  Continue Vancomycin/ / DC Flagyl - Pharmacy on Consult for antibiotic dosing, target Vanc trough 15 - Monitor Cr, Vanc trough - Pt will need removal of pacer , 6 weeks of MSSA  therapy  
 -  CT scan of foot to better evaluate extent of OM 
  -Recommend Vascular evaluation , D/w Podiatry - they will not be able to do amputation - Chopart's Or BKA . Necrotic foot wound likely source of bacteremia & subsequent  seeding Subjective:  
 
Pt seen . Drowsy, just back from procedure D/w RN events of day Patient Active Problem List  
Diagnosis Code  History of breast cancer Z85.3  Asthma J45.909  Fe deficiency anemia D50.9  Status post ablation of atrial fibrillation Z98.890, Z86.79  
 Sick sinus syndrome (HCC) I49.5  S/P cardiac pacemaker procedure Z95.0  Long term (current) use of anticoagulants Z79.01  
 Mixed hyperlipidemia E78.2  CKD (chronic kidney disease), stage IV ([address] N18.4  Systolic murmur H59.4  Essential hypertension I10  
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  
 Diabetic ulcer of right midfoot associated with type 2 diabetes mellitus, with fat layer exposed ([address] E11.621, N55.138  Foot deformity, acquired, left M21.962  Foot deformity, right M21.961  Pes cavus Q66.70  Type 2 diabetes mellitus with left diabetic foot infection ([facility]) E11.628, L08.9  Traumatic hematoma of foot, left, initial encounter G42.13GY  Diabetic ulcer of left midfoot with necrosis of muscle ([address] E11.621, K98.833  Left leg cellulitis L[address]. [address]  Atrial fibrillation with RVR ([facility]) I48.91  
 H/O bilateral mastectomy Z90.13  Sepsis ([address] A41.9 Past Medical History:  
Diagnosis Date  Acquired lymphedema Right arm  Arrhythmia  Asthma  Atrial fibrillation ([address] Dr. Curly Douglas and [facility]  Cancer ([address] bilateral breast  
 Chronic kidney disease  Diabetes mellitus type II   
 Dizziness  Essential hypertension  Hyperlipidemia  Hypertension  Long term (current) use of anticoagulants  Morbid obesity ([address] 3/14/2012  Nausea & vomiting  Neuropathy  Osteoarthritis  Pacemaker  Sick sinus syndrome ([address] Family History Problem Relation Age of Onset  Cancer Mother  Heart Disease Mother  Alcohol abuse Father  Diabetes Brother  Hypertension Brother Social History Tobacco Use  Smoking status: Never Smoker  Smokeless tobacco: Never Used Substance Use Topics  Alcohol use: Yes Comment: rare Past Surgical History:  
Procedure Laterality Date  ABDOMEN SURGERY PROC UNLISTED    
 gastric bypass  GASTRIC BYPASS,OBESE<150CM SAW-EN-Y  7/08  
 [facility]cher  HX AFIB ABLATION    
 HX BREAST RECONSTRUCTION Bilateral   
 HX CARPAL TUNNEL RELEASE [address] [facility] [address] [place] [address] RIGHT MODIFIED RADICAL   
 HX MASTECTOMY Left   
 no lymphnode removal  
 HX MOHS PROCEDURES  1995  
 right  HX ORTHOPAEDIC Right   
 knee surgery  HX PACEMAKER    
 HX PACEMAKER    
 IR INSERT TUNL CVC W PORT OVER 5 YEARS    
 LAMINECTOMY,CERVICAL  1994  
 NEEDLE BIOPSY LIVER,W OTHR [address]  8.21.07  
 [place] [address] AND 1994  [place] TRABECULOPLASTY BY LASER SURGERY    
 US GUIDE ASP OVARIAN CYST ABD APPROACH  8.21.07  
 RIGHT Prior to Admission medications Medication Sig Start Date End Date Taking? Authorizing Provider  
metOLazone (ZAROXOLYN) 2.5 mg tablet Take 1 tablet po on Mon and Thurs for worsening leg swelling. 4/13/20  Yes Geovanna Mendez MD  
warfarin (COUMADIN) 4 mg tablet Take 1 tablet on Tues and Sat and a half tablet the other 5 days. 4/10/20  Yes Geovanna Mendez MD  
bumetanide (BUMEX) 1 mg tablet Take 2 Tabs by mouth daily. 3/26/20  Yes Geovanna Mendez MD  
insulin glargine (Lantus U-100 Insulin) 100 unit/mL injection Inject 14 units daily 3/26/20  Yes Geovanna Mendez MD  
metoprolol succinate (TOPROL-XL) 100 mg tablet Take 2 Tabs by mouth daily.  3/26/20 Yes Paige Bauman MD  
dilTIAZem CD (CARDIZEM CD) 180 mg ER capsule Take 1 Cap by mouth daily. 3/22/20  Yes Jacobo Spence MD  
hydrALAZINE (APRESOLINE) 50 mg tablet Take 1 Tab by mouth three (3) times daily. 3/22/20  Yes Jacobo Spence MD  
acetaminophen 500 mg tab 500 mg, diphenhydrAMINE 25 mg cap 25 mg Take 2 Doses by mouth nightly. Yes Provider, Historical  
sertraline (ZOLOFT) 50 mg tablet TAKE ONE AND ONE-HALF (1 & 1/2) TABLET BY MOUTH DAILY 8/22/19  Yes Paige Bauman MD  
doxazosin (CARDURA) 4 mg tablet TAKE ONE TABLET BY MOUTH ONCE NIGHTLY 6/14/19  Yes Paige Bauman MD  
busPIRone (BUSPAR) 10 mg tablet TAKE ONE TABLET BY MOUTH TWICE A DAY 5/24/19  Yes Paige Bauman MD  
cholecalciferol, VITAMIN D3, (VITAMIN D3) 5,000 unit tab tablet Take 5,000 Units by mouth daily. Yes Provider, Historical  
vitamin A 8,000 unit capsule Take 8,000 Units by mouth daily. Yes Provider, Historical  
insulin lispro (HUMALOG) 100 unit/mL kwikpen 2 units with dinner for sugars over 200 1/3/14  Yes Paige Bauman MD  
cyanocobalamin (VITAMIN B-12) 1,000 mcg sublingual tablet Take 1,000 mcg by mouth daily. Yes Provider, Historical  
 
Allergies Allergen Reactions  Ace Inhibitors Cough  Amlodipine Swelling  Avapro [Irbesartan] Unable to Obtain  Bactrim [Sulfamethoxazole-Trimethoprim] Other (comments) Sore mouth  Captopril Cough  Carvedilol Diarrhea  Contrast Agent [Iodine] Itching [address]  Morphine Nausea and Vomiting and Swelling  Penicillins Hives Did not tolerate cefepime 3/2020  Pravachol [Pravastatin] Nausea Only  Seafood [Shellfish Containing Products] Hives  Singulair [Montelukast] Other (comments)  
  palpitations Review of Systems:  A comprehensive review of systems was negative except for that written in the History of Present Illness. 10 point review of systems obtained .  All other systems negative Objective:  
Blood pressure 134/56, pulse 60, temperature 98.5 °F (36.9 °C), resp. rate 13, height 5' 10\" (1.778 m), weight 242 lb 11.2 oz (110.1 kg), SpO2 98 %, not currently breastfeeding. Temp (24hrs), Av.1 °F (36.7 °C), Min:97.5 °F (36.4 °C), Max:98.7 °F (37.1 °C) Current Facility-Administered Medications Medication Dose Route Frequency  [START ON 2020] vancomycin (VANCOCIN) 1250 mg in  ml infusion  1,250 mg IntraVENous Q36H  phytonadione (VITAMIN K) 1 mg/mL oral solution 5 mg  5 mg Oral DAILY  fentaNYL citrate (PF) injection 25-50 mcg  25-50 mcg IntraVENous Multiple  midazolam (VERSED) injection 0.5-2 mg  0.5-2 mg IntraVENous Multiple  lidocaine (XYLOCAINE) 2 % viscous solution 15 mL  15 mL Mouth/Throat PRN  
 hydrALAZINE (APRESOLINE) 20 mg/mL injection 10 mg  10 mg IntraVENous Q6H PRN  
 dilTIAZem CD (CARDIZEM CD) capsule 180 mg  180 mg Oral DAILY  insulin glargine (LANTUS) injection 10 Units  10 Units SubCUTAneous DAILY  bumetanide (BUMEX) tablet 2 mg  2 mg Oral DAILY  busPIRone (BUSPAR) tablet 10 mg  10 mg Oral BID  doxazosin (CARDURA) tablet 4 mg  4 mg Oral QHS  hydrALAZINE (APRESOLINE) tablet 50 mg  50 mg Oral TID  metoprolol succinate (TOPROL-XL) XL tablet 200 mg  200 mg Oral DAILY  sertraline (ZOLOFT) tablet 50 mg  50 mg Oral DAILY  acetaminophen (TYLENOL) tablet 650 mg  650 mg Oral Q6H PRN  
 glucose chewable tablet 16 g  4 Tab Oral PRN  
 dextrose (D50W) injection syrg 12.5-25 g  25-50 mL IntraVENous PRN  
 glucagon (GLUCAGEN) injection 1 mg  1 mg IntraMUSCular PRN  
 insulin lispro (HUMALOG) injection   SubCUTAneous AC&HS  sodium chloride (NS) flush 5-10 mL  5-10 mL IntraVENous PRN Exam:   
General:  Awake cooperative, Eyes:  Sclera anicteric. Pupils equally round and reactive to light. Mouth/Throat: Mucous membranes normal, oral pharynx clear Neck: Supple Lungs:   Clear to auscultation CV:  Regular rate and rhythm,no murmur, click, rub or gallop Abdomen:   Soft, non-tender. bowel sounds normal. non-distended Extremities: No cyanosis or edema Skin: Skin color, texture, turgor normal. no acute rash or lesions Lymph nodes: Cervical and supraclavicular normal  
Musculoskeletal: LLE swelling, warm, foot dressing + Lines/Devices:  Intact, no erythema, drainage or tenderness Psych: Alert and oriented, normal mood affect Data Review: CBC:  
Recent Labs  
  04/25/20 
0053 04/23/20 
0221 WBC 8.6 6.5  
RBC 2.66* 2.75* HGB 7.6* 7.9*  
HCT 24.3* 25.4*  
 359 CMP:  
Recent Labs  
  04/25/20 0053 04/24/20 
0409 04/23/20 
0221 * 133* 101*  138 138  
K 3.7 3.3* 3.6 * 108 108 CO2 22 23 22 BUN 18 22* 27* CREA 1.38* 1.52* 1.54* CA 8.3* 8.3* 8.4* AGAP 7 7 8 BUCR 13 14 18 Lab Results Component Value Date/Time Culture result: NO GROWTH 4 DAYS 04/21/2020 08:50 AM  
 Culture result: NO ANAEROBES ISOLATED 04/21/2020 07:20 AM  
 Culture result: LIGHT DIPHTHEROIDS (A) 04/21/2020 07:20 AM  
 Culture result: LIGHT STAPHYLOCOCCUS AUREUS (A) 04/20/2020 05:48 PM  
 Culture result: (A) 04/20/2020 05:48 PM  
  FEW STREPTOCOCCI, BETA HEMOLYTIC GROUP G Penicillin and ampicillin are drugs of choice for treatment of beta-hemolytic streptococcal infections. Susceptibility testing of penicillins and beta-lactams approved by the FDA for treatment of beta-hemolytic streptococcal infections need not be performed routinely, because nonsusceptible isolates are extremely rare. CLSI 2012 XR Results (most recent): 
Results from Hospital Encounter encounter on 04/19/20 XR TIB/FIB LT Narrative EXAM: XR TIB/FIB LT 
 
INDICATION: swelling, assess for gas. COMPARISON: None. FINDINGS: AP and lateral  views of the left tibia and fibula demonstrate no 
fracture or dislocation.  There is generalized edema and vascular calcifications 
and there are degenerative changes of the knee and ankle. There is no soft 
tissue gas. Impression IMPRESSION: Generalized edema. No soft tissue gas. ICD-10-CM ICD-9-CM 1. Ulcer of left foot, unspecified ulcer stage ([facility] L97.529 707.15   
2. Osteomyelitis of left foot, unspecified type ([facility] M86.9 730.27 3. Cellulitis of left lower extremity L03.116 682.6 4. Disorder of cardiac pacemaker system, subsequent encounter T82. 9XXD V58.89 ELECTROPHYSIOLOGY PROCEDURE  
  996.72 ELECTROPHYSIOLOGY PROCEDURE  
   CANCELED: INVASIVE VASCULAR PROCEDURE  
   CANCELED: INVASIVE VASCULAR PROCEDURE Antibiotic History I have discussed the diagnosis with the patient and the intended plan as seen in the above orders. I have discussed medication side effects and warnings with the patient as well. Reviewed test results at length with patient Signed By: Dayanara Freed MD FACP

## 2020-04-25 NOTE — PROGRESS NOTES
1900: Received report from day shift nurse Nathan. Reviewed SBAR, Kardex, I/O, MAR, Procedural information and Recent results. VSS, NSR/Atrial Paced (rhythm), RA (oxygenation). A/O X 4 Pt resting in bed/sitting up in the bed. Left upper chest CDI, no bleeding/hematoma. No complaints. Ice pack over site. Pt reporting no CP/SOB. 2245: Evening meds given. PRN tylenol given for shoulder pain. 2300: VSS, Paced, RA, no complaints. Pt reports no CP/SOB. Daily wound care completed per orders. Left foot: cleansed with povidone/betadine moistened 4x4. Applied betadine moist 4x4's and then secured with dry dressing. Right foot: cleansed with dermal wound cleanser spray, applied a pice of silver Opticell-AG moistened with cleanser spray and secured with dry dressing. 0100: Morning labs and Vanc trough drawn and sent to the lab. 0200: Spoke with Tyler in pharmacy regarding the vanc trough result: 20.9. Current vanc order administered. 0300: VSS, Paced, RA, no complaints. Pt reports no CP/SOB.

## 2020-04-25 NOTE — PROGRESS NOTES
I reviewed pertinent labs and imaging, and discussed /agreed on the plan of care with Dr. Toribio Schaumann. Hospitalist Progress Note NAME: Petros Emanuel :  1960 MRN:  [id number] History of Present Illness: 
Patient was seen on 2020 for left foot wound by podiatry. At that time CT and biopsy were negative for OM of left 5th metatarsal.  Wound was debrided and amniotic graft placed. She did not significantly improve despite antibiotics and wound care. Left foot xray showed OM. The patient has decided against amputation at this time and would prefer to completed long term IV antibiotics. ID is following. She was found to have bacteremia and ECHO showed vegetation on a pacer wire. EP attempted to remove wire on 2020 but was unsuccessful. The procedure is rescheduled for 2020. Assessment / Plan: 
Sepsis secondary to left foot cellulitis and OM Bacteremia with MSSA Vegetation on pacemaker lead · 2020:  Soft tissue wound at the level of the third through fifth proximal metatarsals. Underlying cortical irregularity and lucency within the proximal third through fifth metatarsals may represent osteomyelitis. · 2020:  BC with STAPHYLOCOCCUS AUREUS  
· 2020: Wound cultures with LIGHT STAPHYLOCOCCUS AUREUS and FEW STREPTOCOCCI, BETA HEMOLYTIC GROUP G 
· 2020:  Repeat BC with no growth after 3 days · 2020:  ECHO:  Normal cavity size and systolic function (ejection fraction normal). Moderate concentric hypertrophy. Estimated left ventricular ejection fraction is 55 - 60%. Visually measured ejection fraction. Mild present. Mild to moderate aortic valve regurgitation is present. Possible vegetation is present on the Aortic vegetation is on the left coronary cusp of the valve. aortic valve. Mitral valve thickening. Mitral annular calcification. Mild to moderate mitral valve regurgitation is present. Moderate to severe pulmonary hypertension.  Pulmonary arterial systolic pressure is 62 mmHg. Mildly elevated central venous pressure (5-10 mmHg); IVC diameter is less than 21 mm and collapses less than 50% with respiration. · 04/22/2020:  MIGUEL A:  Bilateral ABIs are unreliable due to medial calcinosis. PVR and PPG waveforms appear within normal limits bilaterally. Bilateral TBIs are normal.  
· 04/23/2020:  MIKE with Lambl's excrescences visualized on the valve · 04/23/2020:  CT lower ext:  . Large soft tissue ulcer at the base of the fifth metatarsal. No definite evidence of osteomyelitis but if clinical concern is high MRI imaging would better evaluate · Continue IV Flagyl, and Vancomycin · Appreciate podiatry input; patient wants to try a long course of IV antibiotics vs amputation · Appreciate ID input · Appreciate cardiology input · Appreciate wound care input · Appreciate EP input; lead extraction attempted, resistance felt with gentle traction. Procedure rescheduled for Monday, 04/24/2020 Atrial fibrillation Hypercoagulable state secondary to afib HTN 
· Continue Cardizem, doxazosin, hydralazine, metoprolol, and warfarin CKD stage IV · Cr 1.38; improved from admission · Avoid nephrotoxins · Monitor Type 2 DM · BS AC&HS 
· SSI · Continue Lantus 10 units daily · Hgb A1c 7.5 (03/08/2020) Anemia of chronic disease · Hgb 7.6 · No s/s of active bleeding · Monitor Depression and anxiety · Continue sertraline and buspirone 30.0 - 39.9 Obese / Body mass index is 34.82 kg/m². Code status: Full Prophylaxis: Warfarin Recommended Disposition: Home w/Family Subjective: Chief Complaint / Reason for Physician Visit Follow-up cellulitis and sepsis. Discussed with RN events overnight. Review of Systems: 
Symptom Y/N Comments  Symptom Y/N Comments Fever/Chills n   Chest Pain n   
Poor Appetite n   Edema n   
Cough    Abdominal Pain Sputum    Joint Pain SOB/CHOW n   Pruritis/Rash Nausea/vomit    Tolerating PT/OT Diarrhea    Tolerating Diet y Constipation    Other Could NOT obtain due to:   
 
Objective: VITALS:  
Last 24hrs VS reviewed since prior progress note. Most recent are: 
Patient Vitals for the past 24 hrs: 
 Temp Pulse Resp BP SpO2  
04/25/20 0810 97.9 °F (36.6 °C) 60 20 140/49 100 % 04/25/20 0300 97.8 °F (36.6 °C) 60 18 143/54 99 % 04/24/20 2300 98.7 °F (37.1 °C) 64 18 155/56 98 % 04/24/20 1900 97.5 °F (36.4 °C) 63 18 118/42 97 % 04/24/20 1700  64 18 144/54 96 % 04/24/20 1600  61 12 156/57 98 % 04/24/20 1500  60 18 140/54 97 % 04/24/20 1430  60 16 135/50 98 % 04/24/20 1345  60 18 144/56 96 % 04/24/20 1330  60 18 140/55 97 % 04/24/20 1325  60 20 138/54 97 % 04/24/20 1320  60 19 133/55 96 % 04/24/20 1315  60 20 140/53 97 % 04/24/20 1310  60 18 134/56 97 % 04/24/20 1305  60 20 137/55 96 % 04/24/20 1302 97.5 °F (36.4 °C) 63 16 133/56 98 % 04/24/20 1048 98.3 °F (36.8 °C) 60 18 156/66 97 % Intake/Output Summary (Last 24 hours) at 4/25/2020 9619 Last data filed at 4/24/2020 1910 Gross per 24 hour Intake 640 ml Output 500 ml Net 140 ml PHYSICAL EXAM: 
General: Pleasant obese  female. NAD 
EENT:  EOMI. Anicteric sclerae. MMM Resp:  CTA, no wheezing or rales. No accessory muscle use CV:  Regular rate rhythm,  No edema GI:  Soft, Non distended, Non tender.  +Bowel sounds Neurologic:  Alert and oriented X 3, normal speech, Psych:   Good insight. Not anxious nor agitated Skin:  No rashes. No jaundice, left foot with dry and intact dressing Reviewed most current lab test results and cultures  YES Reviewed most current radiology test results   YES Review and summation of old records today    NO Reviewed patient's current orders and MAR    YES 
PMH/ reviewed - no change compared to H&P 
________________________________________________________________________ Care Plan discussed with: 
  Comments Patient x Family RN x   
Care Manager Consultant Multidiciplinary team rounds were held today with , nursing, pharmacist and clinical coordinator. Patient's plan of care was discussed; medications were reviewed and discharge planning was addressed. ________________________________________________________________________ Total NON critical care TIME:  25   Minutes Total CRITICAL CARE TIME Spent:   Minutes non procedure based Comments >50% of visit spent in counseling and coordination of care    
________________________________________________________________________ Marybeth Parikh NP Procedures: see electronic medical records for all procedures/Xrays and details which were not copied into this note but were reviewed prior to creation of Plan. LABS: 
I reviewed today's most current labs and imaging studies. Pertinent labs include: 
Recent Labs  
  04/25/20 0053 04/23/20 0221 WBC 8.6 6.5 HGB 7.6* 7.9*  
HCT 24.3* 25.4*  
 359 Recent Labs  
  04/25/20 0053 04/24/20 
0409 04/23/20 0221  138 138  
K 3.7 3.3* 3.6 * 108 108 CO2 22 23 22 * 133* 101* BUN 18 22* 27* CREA 1.38* 1.52* 1.54* CA 8.3* 8.3* 8.4* INR 2.6* 2.9* 3.1* Signed: Marybeth Parikh NP

## 2020-04-25 NOTE — PROGRESS NOTES
Problem: Falls - Risk of 
Goal: *Absence of Falls Description: Document [place] Fall Risk and appropriate interventions in the flowsheet. Outcome: Progressing Towards Goal 
Note: Fall Risk Interventions: 
Mobility Interventions: Assess mobility with egress test, Patient to call before getting OOB, Utilize walker, cane, or other assistive device Medication Interventions: Teach patient to arise slowly, Patient to call before getting OOB Elimination Interventions: Bed/chair exit alarm, Call light in reach, Toilet paper/wipes in reach Problem: Pressure Injury - Risk of 
Goal: *Prevention of pressure injury Description: Document Valdemar Scale and appropriate interventions in the flowsheet. Outcome: Progressing Towards Goal 
Note: Pressure Injury Interventions: 
Sensory Interventions: Assess changes in LOC, Check visual cues for pain, Keep linens dry and wrinkle-free, Use 30-degree side-lying position Moisture Interventions: Absorbent underpads, Offer toileting Q_hr, Internal/External urinary devices Activity Interventions: Assess need for specialty bed, Increase time out of bed Mobility Interventions: HOB 30 degrees or less, Turn and reposition approx. every two hours(pillow and wedges) Nutrition Interventions: Document food/fluid/supplement intake, Discuss nutritional consult with provider, Offer support with meals,snacks and hydration Friction and Shear Interventions: Apply protective barrier, creams and emollients

## 2020-04-25 NOTE — PROGRESS NOTES
Pharmacy Automatic Renal Dosing Protocol - Antimicrobials Indication for Antimicrobials: Left foot cellulitis and osteomyelitis Current Regimen of Each Antimicrobial: 
Vancomycin 1000 mg IV every 24 hours (Start Date ; Day # 6) Previous Antimicrobial Therapy: 
Cefepime 1 g IV every 12 hours (Start Date ; Day # 2) Levofloxacin 750 mg IV every 48 hours (Start Date ; Day # 6) Metronidazole 500 mg IV every 12 hours (Start Date ; Day # 6) Goal Level: VANCOMYCIN TROUGH GOAL RANGE Vancomycin Trough: 15 - 20 mcg/mL  (AUC: 400 - 600 mg/hr/Liter/day) Date Dose & Interval Measured (mcg/mL) Extrapolated (mcg/mL)  
 @ 22:38 1250 mg Q24H 20.6 20.5  @ 0053 1000 mg q24h 20.9 20.4 Date & time of next level: before 2nd dose Significant Cultures:  
 Blood: GPC in clusters in - final (S to oxacilin), MSSA  Wound: light staph aureus (MSSA), beta hemolytic strep  wound (anaerobic) cx: light diptheroids : blood - NGTD - pending Radiology / Imaging results: (X-ray, CT scan or MRI):  
 Lt Foot: Soft tissue wound at the level of the third through fifth proximal metatarsals. Underlying cortical irregularity and lucency within the proximal third through fifth metatarsals may represent osteomyelitis Paralysis, amputations, malnutrition: None Labs: 
Recent Labs  
  20 
0053 20 
0409 20 
0221 CREA 1.38* 1.52* 1.54* BUN 18 * 27* WBC 8.6  --  6.5 Temp (24hrs), Av.9 °F (36.6 °C), Min:97.5 °F (36.4 °C), Max:98.7 °F (37.1 °C) Creatinine Clearance (mL/min) or Dialysis: 46.9 mL/min (IBW) Impression/Plan:  
Scr improved some today MSSA in cultures, but patient is PCN allergic Vancomycin trough is above goal trough range Change vancomycin to 1250 mg q36h Plan for pacemaker lead extraction  ID following Antimicrobial stop date pending Pharmacy will follow daily and adjust medications as appropriate for renal function and/or serum levels. Thank you, NOVA Vela

## 2020-04-25 NOTE — PROGRESS NOTES
04/24/20 1910 Intake (mL)  
P.O. 240 mL  
% Diet Eaten 50 % External Female Catheter 04/20/20 Placement Date/Time: 04/20/20 1156   Insertion Practices: Hand hygiene;Perineal cleansing; Connected to suction Site Assessment Clean, dry, & intact Repositioned Yes Perineal Care No  
Wick Changed No  
Suction Canister/Tubing Changed No  
Urine Output (mL) 500 ml Wound Foot Left;Plantar #1 12/17/19 Date First Assessed/Time First Assessed: 12/17/19 1529   Present on Hospital Admission: Yes  Wound Approximate Age at First Assessment (Weeks): 5 weeks  Primary Wound Type: Diabetic Ulcer  Location: Foot  Wound Location Orientation: Left;Plantar  Woun. .. Dressing Status Clean, dry, and intact; Changed per order Dressing Type Silver products;4 x 4;Gauze Non-staged Wound Description Full thickness Drainage Amount Small Drainage Color Tan Wound Odor None Sandra-wound Assessment Intact Cleansing and Cleansing Agents  Betadine Dressing Changed Changed/New Dressing Type Applied 4 x 4;ABD binder;Gauze; Moist to moist  
Procedure Tolerated Well Wound Foot Right;Plantar small diabetic ulcer on bottom of right foot Date First Assessed/Time First Assessed: 03/09/20 1533   Present on Hospital Admission: Yes  Primary Wound Type: Diabetic Ulcer  Location: Foot  Wound Location Orientation: Right;Plantar  Wound Description: small diabetic ulcer on bottom of right foot Dressing Status Clean, dry, and intact; Changed per order Dressing Type Silver products;4 x 4;Gauze Non-staged Wound Description Partial thickness Pressure Injury Stage 2 Drainage Amount Small Drainage Color Tan Wound Odor None Sandra-wound Assessment Intact Cleansing and Cleansing Agents  Dermal wound cleanser Dressing Changed Changed/New Dressing Type Applied Silver products;4 x 4;Gauze Procedure Tolerated Well Wound Foot Left Date First Assessed/Time First Assessed: 03/21/20 1548   Primary Wound Type:  Incision Location: Foot  Wound Location Orientation: Left Dressing Status Clean, dry, and intact; Changed per order Dressing Type 4 x 4;ABD binder;Gauze; Moist to moist  
Drainage Amount Small Drainage Color Serosanguinous Wound Odor None Cleansing and Cleansing Agents  Betadine Dressing Changed Changed/New Dressing Type Applied 4 x 4;ABD binder;Gauze; Moist to moist  
Procedure Tolerated Well Wound Foot Right;Plantar;Lateral #2 07/30/19 Date First Assessed/Time First Assessed: 07/30/19 1530   Wound Approximate Age at First Assessment (Weeks): 24 weeks  Primary Wound Type: Diabetic Ulcer  Location: Foot  Wound Location Orientation: Right;Plantar;Lateral  Wound Description: #2  Date of. .. Dressing Status Clean, dry, and intact; Changed per order Dressing Type Silver products;4 x 4;Gauze Wound Foot Plantar;Mid;Left arch of foot 02/18/20 Date First Assessed/Time First Assessed: 02/18/20 1516   Wound Approximate Age at First Assessment (Weeks): 1 weeks  Primary Wound Type: Friction  Location: Foot  Wound Location Orientation: Plantar;Mid;Left  Wound Description: arch of foot  Date of F. .. Dressing Status Clean, dry, and intact; Changed per order Dressing Type 4 x 4;ABD binder;Gauze; Moist to moist  
Wound Foot Left serosanguinous drainage, red and puffy around 3 ulcer sites 03/09/20 Date First Assessed/Time First Assessed: 03/09/20 1531   Present on Hospital Admission: Yes  Primary Wound Type: Diabetic Ulcer  Location: Foot  Wound Location Orientation: Left  Wound Description: serosanguinous drainage, red and puffy around 3 ulcer. .. Dressing Status Clean, dry, and intact; Changed per order Dressing Type 4 x 4;ABD binder;Gauze; Moist to moist

## 2020-04-25 NOTE — PROGRESS NOTES
Initial Nutrition Assessment: 
 
INTERVENTIONS/RECOMMENDATIONS:  
· Continue current diet · Add DM restrictions if BG become uncontrolled · Gelatein BID (20 g protein each) · Please document % meals and supplements consumed in flowsheet I/O's under intake ASSESSMENT:  
Chart reviewed, medically noted forChronic diabetic ulcer left foot,Osteomyelitis left metatarsals 3, 4 and 5, CKD4, T2DM, and PMH shown below. Nutrition referral triggered due to documented stage 2 pressure injury. Per wound care note full thickness wounds on right and left feet. Will add protein dense supplements to aid in wound healing. Past Medical History:  
Diagnosis Date  Acquired lymphedema Right arm  Arrhythmia  Asthma  Atrial fibrillation ([facility] Dr. Gabriele Poon and Dr. Nicole alves [facility])  Cancer ([facility] bilateral breast  
 Chronic kidney disease  Diabetes mellitus type II   
 Dizziness  Essential hypertension  Hyperlipidemia  Hypertension  Long term (current) use of anticoagulants  Morbid obesity ([facility] 3/14/2012  Nausea & vomiting  Neuropathy  Osteoarthritis  Pacemaker  Sick sinus syndrome ([facility] Diet Order: Cardiac 
% Eaten:   
Patient Vitals for the past 72 hrs: 
 % Diet Eaten 04/24/20 1910 50 % 04/23/20 1715 60 % 04/23/20 0812 0 % Pertinent Medications: [x]Reviewed: bumex, lantus, humalog, vitamin K Pertinent Labs: [x]Reviewed: CRP 14.4 Food Allergies: [x]NKFA  []Other Last BM: 4/21 Edema:        []RUE   []LUE   [x]RLE 1+  [x]LLE 1+ Pressure Injury:   Full thickness (Bilateral foot)   [] Stage I   [] Stage II   [] Stage III   [] Stage IV Wt Readings from Last 30 Encounters:  
04/23/20 110.1 kg (242 lb 11.2 oz)  
03/26/20 113.9 kg (251 lb)  
03/19/20 108.2 kg (238 lb 8.6 oz) 01/24/20 111.6 kg (246 lb)  
01/14/20 113.5 kg (250 lb 3.6 oz)  
12/12/19 119.3 kg (263 lb) 12/06/19 113.9 kg (251 lb 1.7 oz)  
11/13/19 115.4 kg (254 lb 8 oz) 09/27/19 115.2 kg (254 lb) 09/03/19 118.2 kg (260 lb 9.3 oz) 08/15/19 117.8 kg (259 lb 9.6 oz) 08/09/19 118 kg (260 lb 2.3 oz) 07/10/19 117.9 kg (260 lb) 06/06/19 118.8 kg (262 lb) 05/30/19 118.2 kg (260 lb 8 oz) 04/08/19 118.8 kg (261 lb 12.8 oz) 03/11/19 122.2 kg (269 lb 8 oz)  
11/12/18 126.6 kg (279 lb)  
06/11/18 123.4 kg (272 lb) 04/19/18 122.5 kg (270 lb) 02/07/18 121.6 kg (268 lb)  
08/22/17 121.6 kg (268 lb)  
06/19/17 119.5 kg (263 lb 6.4 oz) 06/15/17 120.9 kg (266 lb 8 oz) 05/22/17 120 kg (264 lb 8 oz) 05/01/17 120.2 kg (265 lb)  
03/30/17 120.7 kg (266 lb)  
01/16/17 118.4 kg (261 lb) 12/06/16 118.9 kg (262 lb 3.2 oz)  
11/28/16 117 kg (258 lb) Anthropometrics:  
Height: 5' 10\" (177.8 cm) Weight: 110.1 kg (242 lb 11.2 oz) IBW (%IBW):   ( ) UBW (%UBW):   (  %) Last Weight Metrics: 
Weight Loss Metrics 4/23/2020 3/26/2020 3/19/2020 1/24/2020 1/14/2020 12/12/2019 12/6/2019 Today's Wt 242 lb 11.2 oz 251 lb 238 lb 8.6 oz 246 lb 250 lb 3.6 oz 263 lb 251 lb 1.7 oz  
BMI 34.82 kg/m2 37.07 kg/m2 35.23 kg/m2 35.3 kg/m2 35.9 kg/m2 38.84 kg/m2 37.08 kg/m2 BMI: Body mass index is 34.82 kg/m². This BMI is indicative of: 
 []Underweight    []Normal    []Overweight    [x] Obesity   [] Extreme Obesity (BMI>40) Estimated Nutrition Needs (Based on):  
9706 Kcals/day(20 kcal/kg Adj BW) , 110 g(1 g/kg) Protein Carbohydrate: At Least 130 g/day  Fluids: 1575 mL/day (1ml/kcal) or per primary team 
 
NUTRITION DIAGNOSES:  
Problem:  Increased nutrient needs Etiology: related to wound healing Signs/Symptoms: as evidenced by bilateral full thickness foot wounds NUTRITION INTERVENTIONS: 
Meals/Snacks: General/healthful diet   Supplements: Commercial supplement GOAL:  
consume >50% of meals and ONS in 3-5 days LEARNING NEEDS (Diet, Food/Nutrient-Drug Interaction):  
 [x] None Identified 
 [] Identified and Education Provided/Documented 
 [] Identified and Pt declined/was not appropriate Cultureal, Yazidi, OR Ethnic Dietary Needs:  
 [x] None Identified 
 [] Identified and Addressed 
 
 [x] Interdisciplinary Care Plan Reviewed/Documented  
 [x] Discharge Planning: Consistent carb, low Na diet MONITORING /EVALUATION:  
  
Food/Nutrient Intake Outcomes: Total energy intake Physical Signs/Symptoms Outcomes: Weight/weight change, Electrolyte and renal profile, Glucose profile, GI 
 
NUTRITION RISK:  
 [] High              [x] Moderate           []  Low  []  Minimal/Uncompromised PT SEEN FOR:  
 []  MD Consult: []Calorie Count []Diabetic Diet Education []Diet Education []Electrolyte Management []General Nutrition Management and Supplements []Management of Tube Feeding []TPN Recommendations [x]  RN Referral:  []MST score >=2 
   []Enteral/Parenteral Nutrition PTA []Pregnant: Gestational DM or Multigestation [x]Pressure Ulcer/Wound Care needs 
     
[]  Low BMI 
[]  LOS Referral  
 
 
Lani Elizabeth RDN Pager [phone] Weekend Pager [phone]

## 2020-04-26 NOTE — PROGRESS NOTES
Pharmacy Automatic Renal Dosing Protocol - Antimicrobials Indication for Antimicrobials: Left foot cellulitis and osteomyelitis Current Regimen of Each Antimicrobial: 
Vancomycin pharmacy to dose (Start Date ; Day # 7) Previous Antimicrobial Therapy: 
Cefepime 1 g IV every 12 hours (Start Date ; Day # 2) Levofloxacin 750 mg IV every 48 hours (Start Date ; Day # 6) Metronidazole 500 mg IV every 12 hours (Start Date ; Day # 6) Goal Level: VANCOMYCIN TROUGH GOAL RANGE Vancomycin Trough: 15 - 20 mcg/mL  (AUC: 400 - 600 mg/hr/Liter/day) Date Dose & Interval Measured (mcg/mL) Extrapolated (mcg/mL)  
 @ 22:38 1250 mg Q24H 20.6 20.5  @ 0053 1000 mg q24h 20.9 20.4  
 @ 1316 1250 mg q 24hr 20.8 26 Date & time of next level:  at 1200 Significant Cultures:  
 Blood: GPC in clusters in - final (S to oxacilin), MSSA  Wound: light staph aureus (MSSA), beta hemolytic strep  wound (anaerobic) cx: light diptheroids : blood - NGTD - pending Radiology / Imaging results: (X-ray, CT scan or MRI):  
 Lt Foot: Soft tissue wound at the level of the third through fifth proximal metatarsals. Underlying cortical irregularity and lucency within the proximal third through fifth metatarsals may represent osteomyelitis Paralysis, amputations, malnutrition: None Labs: 
Recent Labs  
  20 
0326 20 
0053 20 
0409 CREA 1.46* 1.38* 1.52* BUN 15 18 22* WBC  --  8.6  -- Temp (24hrs), Av.4 °F (36.9 °C), Min:97.3 °F (36.3 °C), Max:99 °F (37.2 °C) Creatinine Clearance (mL/min) or Dialysis: 44.3 mL/min (IBW) Impression/Plan: · MSSA in cultures, but patient is PCN allergic · Vancomycin trough following 1000mg q 24h today came back supratherapeutic today at 20.8mcg/mL, but extrapolated to 26mcg/mL.  Dose that was entered to start today 1250mg every 36hr is appropriate for new duration to yield a trough of ~15mcg/mL and AUC 541. 
· Continue vancomycin 1250 mg q36h · Plan for pacemaker lead extraction 4/27 · ID following · Antimicrobial stop date pending Pharmacy will follow daily and adjust medications as appropriate for renal function and/or serum levels. Thank you, RICO FrazierD 
 
Recommended duration of therapy 
[email or web address]/Pharmacy/Clinical%[email or web address]. docx Renal Dosing 
[email or web address]/[place]/[facility]/Pharmacy/Clinical%20Companion/Renal%[email or web address]. pdf

## 2020-04-26 NOTE — PROGRESS NOTES
Problem: Falls - Risk of 
Goal: *Absence of Falls Description: Document Lear Shaker Fall Risk and appropriate interventions in the flowsheet. Outcome: Progressing Towards Goal 
Note: Fall Risk Interventions: 
Mobility Interventions: Assess mobility with egress test, Bed/chair exit alarm, Communicate number of staff needed for ambulation/transfer, OT consult for ADLs, Patient to call before getting OOB, PT Consult for mobility concerns, PT Consult for assist device competence, Strengthening exercises (ROM-active/passive), Utilize walker, cane, or other assistive device Medication Interventions: Assess postural VS orthostatic hypotension, Bed/chair exit alarm, Evaluate medications/consider consulting pharmacy, Patient to call before getting OOB, Teach patient to arise slowly Elimination Interventions: Bed/chair exit alarm, Call light in reach, Elevated toilet seat, Patient to call for help with toileting needs, Stay With Me (per policy), Toilet paper/wipes in reach, Toileting schedule/hourly rounds Problem: Patient Education: Go to Patient Education Activity Goal: Patient/Family Education Outcome: Progressing Towards Goal 
  
Problem: Pressure Injury - Risk of 
Goal: *Prevention of pressure injury Description: Document Valdemar Scale and appropriate interventions in the flowsheet. Outcome: Progressing Towards Goal 
Note: Pressure Injury Interventions: 
Sensory Interventions: Assess changes in LOC, Assess need for specialty bed, Avoid rigorous massage over bony prominences, Discuss PT/OT consult with provider, Keep linens dry and wrinkle-free, Maintain/enhance activity level, Minimize linen layers, Monitor skin under medical devices, Pad between skin to skin, Sit a 90-degree angle/use footstool if needed, Pressure redistribution bed/mattress (bed type), Turn and reposition approx. every two hours (pillows and wedges if needed) Moisture Interventions: Apply protective barrier, creams and emollients, Assess need for specialty bed, Check for incontinence Q2 hours and as needed Activity Interventions: Assess need for specialty bed, Pressure redistribution bed/mattress(bed type), Increase time out of bed, PT/OT evaluation Mobility Interventions: Assess need for specialty bed, Float heels, HOB 30 degrees or less, PT/OT evaluation, Turn and reposition approx. every two hours(pillow and wedges) Nutrition Interventions: Document food/fluid/supplement intake Friction and Shear Interventions: Apply protective barrier, creams and emollients, Feet elevated on foot rest, Lift sheet, Minimize layers, Transferring/repositioning devices, Transfer aides:transfer board/Estefania lift/ceiling lift, Sit at 90-degree angle Problem: Patient Education: Go to Patient Education Activity Goal: Patient/Family Education Outcome: Progressing Towards Goal 
  
Problem: Impaired Skin Integrity/Pressure Injury Treatment Goal: *Improvement of Existing Pressure Injury Outcome: Progressing Towards Goal 
Goal: *Prevention of pressure injury Description: Document Valdemar Scale and appropriate interventions in the flowsheet. Outcome: Progressing Towards Goal 
Note: Pressure Injury Interventions: 
Sensory Interventions: Assess changes in LOC, Assess need for specialty bed, Avoid rigorous massage over bony prominences, Discuss PT/OT consult with provider, Keep linens dry and wrinkle-free, Maintain/enhance activity level, Minimize linen layers, Monitor skin under medical devices, Pad between skin to skin, Sit a 90-degree angle/use footstool if needed, Pressure redistribution bed/mattress (bed type), Turn and reposition approx. every two hours (pillows and wedges if needed) Moisture Interventions: Apply protective barrier, creams and emollients, Assess need for specialty bed, Check for incontinence Q2 hours and as needed Activity Interventions: Assess need for specialty bed, Pressure redistribution bed/mattress(bed type), Increase time out of bed, PT/OT evaluation Mobility Interventions: Assess need for specialty bed, Float heels, HOB 30 degrees or less, PT/OT evaluation, Turn and reposition approx. every two hours(pillow and wedges) Nutrition Interventions: Document food/fluid/supplement intake Friction and Shear Interventions: Apply protective barrier, creams and emollients, Feet elevated on foot rest, Lift sheet, Minimize layers, Transferring/repositioning devices, Transfer aides:transfer board/Estefania lift/ceiling lift, Sit at 90-degree angle Problem: Patient Education: Go to Patient Education Activity Goal: Patient/Family Education Outcome: Progressing Towards Goal 
  
Problem: Pain Goal: *Control of Pain Outcome: Progressing Towards Goal 
Goal: *PALLIATIVE CARE:  Alleviation of Pain Outcome: Progressing Towards Goal 
  
Problem: Patient Education: Go to Patient Education Activity Goal: Patient/Family Education Outcome: Progressing Towards Goal 
  
Problem: Diabetes Maintenance:Admission Goal: Activity/Safety Outcome: Progressing Towards Goal 
Goal: Diagnostic Tests/Procedures Outcome: Progressing Towards Goal 
Goal: Nutrition Outcome: Progressing Towards Goal 
Goal: Medications Outcome: Progressing Towards Goal 
Goal: Treatments/Interventions/Procedures Outcome: Progressing Towards Goal 
  
Problem: Diabetes Maintenance:Ongoing Goal: Activity/Safety Outcome: Progressing Towards Goal 
Goal: Nutrition Outcome: Progressing Towards Goal 
Goal: Medications Outcome: Progressing Towards Goal 
Goal: Treatments/Interventsions/Procedures Outcome: Progressing Towards Goal 
Goal: *Blood Glucose 80 to 180 md/dl Outcome: Progressing Towards Goal 
  
Problem: Diabetes Maintenance:Discharge Outcomes Goal: *Describes follow-up/return visits to physicians Outcome: Progressing Towards Goal 
Goal: *Blood glucose at patient's target range or approaching Outcome: Progressing Towards Goal 
Goal: *Aware of nutrition guidelines Outcome: Progressing Towards Goal 
Goal: *Verbalizes information about medication Description: Verbalizes name, dosage, time, side effects, and number of days to 
continue medications. Outcome: Progressing Towards Goal 
Goal: *Describes goals, rules, symptoms, and treatments Description: Describes blood glucose goals, monitoring, sick day rules, 
hypo/hyperglycemia prevention, symptoms, and treatment Outcome: Progressing Towards Goal 
Goal: *Describes available outpatient diabetes resources and support systems Outcome: Progressing Towards Goal 
  
Problem: Patient Education: Go to Patient Education Activity Goal: Patient/Family Education Outcome: Progressing Towards Goal 
  
Problem: Sepsis: Day of Diagnosis Goal: Off Pathway (Use only if patient is Off Pathway) Outcome: Progressing Towards Goal 
Goal: *Fluid resuscitation Outcome: Progressing Towards Goal 
Goal: *Paired blood cultures prior to first dose of antibiotic Outcome: Progressing Towards Goal 
Goal: *First dose of  appropriate antibiotic within 3 hours of arrival to ED, within 1 hour of arrival to ICU Outcome: Progressing Towards Goal 
Goal: *Lactic acid with first set of blood cultures Outcome: Progressing Towards Goal 
Goal: *Pneumococcal immunization (if eligible) Outcome: Progressing Towards Goal 
Goal: *Influenza immunization (if eligible) Outcome: Progressing Towards Goal 
Goal: Activity/Safety Outcome: Progressing Towards Goal 
Goal: Consults, if ordered Outcome: Progressing Towards Goal 
Goal: Diagnostic Test/Procedures Outcome: Progressing Towards Goal 
Goal: Nutrition/Diet Outcome: Progressing Towards Goal 
Goal: Discharge Planning Outcome: Progressing Towards Goal 
Goal: Medications Outcome: Progressing Towards Goal 
Goal: Respiratory Outcome: Progressing Towards Goal 
Goal: Treatments/Interventions/Procedures Outcome: Progressing Towards Goal 
Goal: Psychosocial 
Outcome: Progressing Towards Goal 
  
Problem: Sepsis: Day 5 Goal: Off Pathway (Use only if patient is Off Pathway) Outcome: Progressing Towards Goal 
Goal: *Oxygen saturation within defined limits Outcome: Progressing Towards Goal 
Goal: *Vital signs within defined limits Outcome: Progressing Towards Goal 
Goal: *Tolerating diet Outcome: Progressing Towards Goal 
Goal: *Demonstrates progressive activity Outcome: Progressing Towards Goal 
Goal: *Discharge plan identified Outcome: Progressing Towards Goal 
Goal: Activity/Safety Outcome: Progressing Towards Goal 
Goal: Consults, if ordered Outcome: Progressing Towards Goal 
Goal: Diagnostic Test/Procedures Outcome: Progressing Towards Goal 
Goal: Nutrition/Diet Outcome: Progressing Towards Goal 
Goal: Discharge Planning Outcome: Progressing Towards Goal 
Goal: Medications Outcome: Progressing Towards Goal 
Goal: Respiratory Outcome: Progressing Towards Goal 
Goal: Treatments/Interventions/Procedures Outcome: Progressing Towards Goal 
Goal: Psychosocial 
Outcome: Progressing Towards Goal

## 2020-04-26 NOTE — PROGRESS NOTES
Bedside shift change report given to Stef Lamas RN (oncoming nurse) by Shahla Whaley RN (offgoing nurse). Report included the following information SBAR, Kardex, ED Summary, Procedure Summary, Intake/Output, MAR, Accordion, Recent Results, Med Rec Status, Cardiac Rhythm Paced, Alarm Parameters , Pre Procedure Checklist, Procedure Verification, Quality Measures and Dual Neuro Assessment.

## 2020-04-26 NOTE — ROUTINE PROCESS
1200: RN has been unsuccessful in drawing pt's Vanc trough, I have contacted PICC team to try this lab draw. 1400: Lab called to report high Vanc trough level. I have called pharmacy to discuss whether or not to give dose of Vanc. Awaiting call back from them.

## 2020-04-26 NOTE — PROGRESS NOTES
I reviewed pertinent labs and imaging, and discussed /agreed on the plan of care with Dr. Bryan Orantes. Hospitalist Progress Note NAME: Reza South :  1960 MRN:  [id number] History of Present Illness: 
Patient was seen on 2020 for left foot wound by podiatry. At that time CT and biopsy were negative for OM of left 5th metatarsal.  Wound was debrided and amniotic graft placed. She did not significantly improve despite antibiotics and wound care. Left foot xray showed OM. The patient has decided against amputation at this time and would prefer to completed long term IV antibiotics. ID is following. She was found to have bacteremia and ECHO showed vegetation on a pacer wire. EP attempted to remove wire on 2020 but was unsuccessful. The procedure is rescheduled for 2020. Assessment / Plan: 
Sepsis secondary to left foot cellulitis and OM Bacteremia with MSSA Vegetation on pacemaker lead · 2020:  Soft tissue wound at the level of the third through fifth proximal metatarsals. Underlying cortical irregularity and lucency within the proximal third through fifth metatarsals may represent osteomyelitis. · 2020:  BC with STAPHYLOCOCCUS AUREUS  
· 2020: Wound cultures with LIGHT STAPHYLOCOCCUS AUREUS and FEW STREPTOCOCCI, BETA HEMOLYTIC GROUP G 
· 2020:  Repeat BC with no growth after 3 days · 2020:  ECHO:  Normal cavity size and systolic function (ejection fraction normal). Moderate concentric hypertrophy. Estimated left ventricular ejection fraction is 55 - 60%. Visually measured ejection fraction. Mild present. Mild to moderate aortic valve regurgitation is present. Possible vegetation is present on the Aortic vegetation is on the left coronary cusp of the valve. aortic valve. Mitral valve thickening. Mitral annular calcification. Mild to moderate mitral valve regurgitation is present. Moderate to severe pulmonary hypertension.  Pulmonary arterial systolic pressure is 62 mmHg. Mildly elevated central venous pressure (5-10 mmHg); IVC diameter is less than 21 mm and collapses less than 50% with respiration. · 04/22/2020:  MIGUEL A:  Bilateral ABIs are unreliable due to medial calcinosis. PVR and PPG waveforms appear within normal limits bilaterally. Bilateral TBIs are normal.  
· 04/23/2020:  MIKE with Lambl's excrescences visualized on the valve · 04/23/2020:  CT lower ext:  . Large soft tissue ulcer at the base of the fifth metatarsal. No definite evidence of osteomyelitis but if clinical concern is high MRI imaging would better evaluate · Continue IV Flagyl, and Vancomycin · Appreciate podiatry input; patient wants to try a long course of IV antibiotics vs amputation · Appreciate ID input · Appreciate cardiology input · Appreciate wound care input · Appreciate EP input; lead extraction attempted, resistance felt with gentle traction, 04/24/2020. Procedure rescheduled for Monday. Atrial fibrillation Hypercoagulable state secondary to afib HTN 
· Continue Cardizem, doxazosin, hydralazine, metoprolol, and warfarin CKD stage IV · Cr 1.46; improved from admission · Avoid nephrotoxins · Monitor Type 2 DM · BS AC&HS 
· SSI · Continue Lantus 10 units daily · Hgb A1c 7.5 (03/08/2020) Anemia of chronic disease · Hgb 7.6 · No s/s of active bleeding · Monitor Depression and anxiety · Continue sertraline and buspirone 30.0 - 39.9 Obese / Body mass index is 34.87 kg/m². Code status: Full Prophylaxis: Warfarin Recommended Disposition: Home w/Family Subjective: Chief Complaint / Reason for Physician Visit Follow-up cellulitis and sepsis. Discussed with RN events overnight. Review of Systems: 
Symptom Y/N Comments  Symptom Y/N Comments Fever/Chills n   Chest Pain n   
Poor Appetite n   Edema n   
Cough    Abdominal Pain Sputum    Joint Pain SOB/CHOW n   Pruritis/Rash Nausea/vomit Tolerating PT/OT Diarrhea    Tolerating Diet y Constipation    Other Could NOT obtain due to:   
 
Objective: VITALS:  
Last 24hrs VS reviewed since prior progress note. Most recent are: 
Patient Vitals for the past 24 hrs: 
 Temp Pulse Resp BP SpO2  
04/26/20 0728 97.3 °F (36.3 °C) 60 16 144/57 97 % 04/26/20 0343 98.1 °F (36.7 °C)  18 136/49 96 % 04/26/20 0342  60     
04/25/20 2309 99 °F (37.2 °C) 61 20 134/51 100 % 04/25/20 2304  60 22 134/51 98 % 04/25/20 1934  63 22  96 % 04/25/20 1933 98.8 °F (37.1 °C) 63 20 147/55 96 % 04/25/20 1444 98.4 °F (36.9 °C) (!) 1 17 137/51 100 % 04/25/20 1101 98.5 °F (36.9 °C) 60 13 134/56 98 % Intake/Output Summary (Last 24 hours) at 4/26/2020 1022 Last data filed at 4/26/2020 1150 Gross per 24 hour Intake 800 ml Output 1760 ml Net -960 ml PHYSICAL EXAM: 
General: No acute distress. Pleasant obese  female EENT:  EOMI. Anicteric sclerae. MMM Resp:  Clear to auscultation, no wheezing or rales. No accessory muscle use CV:  RRR,  No edema GI:  Soft, Non distended, Non tender.  +Bowel sounds Neurologic:  Alert and oriented X 3, normal speech, Psych:   Good insight. Not anxious nor agitated Skin:  No rashes. No jaundice, left foot with dry and intact dressing Reviewed most current lab test results and cultures  YES Reviewed most current radiology test results   YES Review and summation of old records today    NO Reviewed patient's current orders and MAR    YES 
PMH/SH reviewed - no change compared to H&P 
________________________________________________________________________ Care Plan discussed with: 
  Comments Patient x Family RN x Care Manager Consultant Multidiciplinary team rounds were held today with , nursing, pharmacist and clinical coordinator.   Patient's plan of care was discussed; medications were reviewed and discharge planning was addressed. ________________________________________________________________________ Total NON critical care TIME:  25   Minutes Total CRITICAL CARE TIME Spent:   Minutes non procedure based Comments >50% of visit spent in counseling and coordination of care    
________________________________________________________________________ Marybeth Parikh NP Procedures: see electronic medical records for all procedures/Xrays and details which were not copied into this note but were reviewed prior to creation of Plan. LABS: 
I reviewed today's most current labs and imaging studies. Pertinent labs include: 
Recent Labs  
  04/25/20 
0053 WBC 8.6 HGB 7.6* HCT 24.3*  
 Recent Labs  
  04/26/20 
6658 04/25/20 
0053 04/24/20 
0409  140 138  
K 3.7 3.7 3.3*  
* 111* 108 CO2 22 22 23 * 101* 133* BUN 15 18 22* CREA 1.46* 1.38* 1.52* CA 8.3* 8.3* 8.3* INR 1.5* 2.6* 2.9* Signed: Marybeth Parikh NP

## 2020-04-26 NOTE — PROGRESS NOTES
Bedside shift change report given to Jacey Gastelum RN (oncoming nurse) by Alexandria Recinos RN (offgoing nurse). Report included the following information SBAR, Kardex, ED Summary, Procedure Summary, Intake/Output, MAR, Accordion, Recent Results, Med Rec Status, Cardiac Rhythm Paced, Alarm Parameters , Pre Procedure Checklist, Procedure Verification, Quality Measures and Dual Neuro Assessment.

## 2020-04-27 NOTE — PROGRESS NOTES
Bedside shift change report given to Lv Lloyd RN (oncoming nurse) by Dominique Freeman RN (offgoing nurse). Report included the following information SBAR, Kardex, Procedure Summary, Intake/Output, MAR, Accordion, Recent Results, Med Rec Status, Cardiac Rhythm NSR, Paced, Alarm Parameters , Pre Procedure Checklist, Procedure Verification, Quality Measures and Dual Neuro Assessment.

## 2020-04-27 NOTE — PROGRESS NOTES
Pharmacy Automatic Renal Dosing Protocol - Antimicrobials Indication for Antimicrobials: Left foot cellulitis and osteomyelitis Current Regimen of Each Antimicrobial: 
Vancomycin pharmacy to dose  (Start Date ; Day # 8) Previous Antimicrobial Therapy: 
Cefepime 1 g IV every 12 hours (Start Date ; Day # 2) Levofloxacin 750 mg IV every 48 hours (Start Date ; Day # 6) Metronidazole 500 mg IV every 12 hours (Start Date ; Day # 6) Goal Level: VANCOMYCIN TROUGH GOAL RANGE Vancomycin Trough: 15 - 20 mcg/mL  (AUC: 400 - 600 mg/hr/Liter/day) Date Dose & Interval Measured (mcg/mL) Extrapolated (mcg/mL)  
 @ 22:38 1250 mg Q24H 20.6 20.5  @ 0053 1000 mg q24h 20.9 20.4  
 @ 1316 1250 mg q 24hr 20.8 26 Date & time of next level: maybe  Significant Cultures:  
 Blood: GPC in clusters in - final (S to oxacilin), MSSA  Wound: light staph aureus (MSSA), beta hemolytic strep  wound (anaerobic) cx: light diptheroids : blood - NGTD - final 
 
Radiology / Imaging results: (X-ray, CT scan or MRI):  
 Lt Foot: Soft tissue wound at the level of the third through fifth proximal metatarsals. Underlying cortical irregularity and lucency within the proximal third through fifth metatarsals may represent osteomyelitis Paralysis, amputations, malnutrition: None Labs: 
Recent Labs  
  20 
0304 20 
9832 20 
0053 CREA 1.34* 1.46* 1.38* BUN 16 15 18 WBC 10.6  --  8.6 Temp (24hrs), Av.1 °F (36.7 °C), Min:97.9 °F (36.6 °C), Max:98.3 °F (36.8 °C) Creatinine Clearance (mL/min) or Dialysis: 48.3 mL/min (IBW) Impression/Plan: · MSSA in cultures, but patient is PCN allergic · Vancomycin trough following 1000mg q 24h today came back supratherapeutic today at 20.8mcg/mL, but extrapolated to 26mcg/mL.  Dose that was entered to start today 1250mg every 36hr is appropriate for new duration to yield a trough of ~15mcg/mL and AUC 541. 
· Continue vancomycin 1250 mg q36h · Plan for pacemaker lead extraction 4/27 · ID following · Antimicrobial stop date pending Pharmacy will follow daily and adjust medications as appropriate for renal function and/or serum levels. Thank you, RICO FrazierD 
 
Recommended duration of therapy 
http://[facility]/Pharmacy/Clinical%20Companion/Duration%20of%20ABX%20therapy. docx Renal Dosing 
http://[facility]/[place]/[facility]/Pharmacy/Clinical%20Companion/Renal%[id number]. pdf

## 2020-04-27 NOTE — PROGRESS NOTES
Spiritual Care Assessment/Progress Note [address] 
 
 
NAME: Ester England      MRN: [id number] AGE: 60 y.o. SEX: female Scientologist Affiliation: [facility]  
Language: [place] 4/27/2020     Total Time (in minutes): 12 Spiritual Assessment begun in [facility] 2 CRITICAL CARE 1 through conversation with: 
  
    []Patient        [] Family    [] Friend(s) Reason for Consult: Initial/Spiritual assessment, critical care Spiritual beliefs: (Please include comment if needed) 
   [] Identifies with a adriane tradition:     
   [] Supported by a adriane community:        
   [] Claims no spiritual orientation:       
   [] Seeking spiritual identity:            
   [] Adheres to an individual form of spirituality:       
   [x] Not able to assess:                   
 
    
Identified resources for coping:  
   [] Prayer                           
   [] Music                  [] Guided Imagery 
   [] Family/friends                 [] Pet visits [] Devotional reading                         [x] Unknown 
   [] Other Interventions offered during this visit: (See comments for more details) Patient Interventions: Initial visit Plan of Care: 
 
 [x] Support spiritual and/or cultural needs  
 [] Support AMD and/or advance care planning process    
 [] Support grieving process 
 [] Coordinate Rites and/or Rituals  
 [] Coordination with community clergy [] No spiritual needs identified at this time 
 [] Detailed Plan of Care below (See Comments)  [] Make referral to Music Therapy 
[] Make referral to Pet Therapy    
[] Make referral to Addiction services 
[] Make referral to [facility] 
[] Make referral to Spiritual Care Partner 
[] No future visits requested       
[x] Follow up visits as needed Comments:  Attempted Initial Spiritual Assessment in CCU. Unable to assess patients needs. No family present at this time.    Left pastoral care note. Chaplains will follow as able and/or as needed. Linda Bustillo, MPS, [address] [facility] [address], Staff  [facility]  Paging Service  [phone]

## 2020-04-27 NOTE — PROGRESS NOTES
Ms ashley is sp dual lead extraction and device removal. Heavily calcified from the medial subclavian down to the ra and then again at the rv tip. Will transfer to ccu for observation overnight. Check cbc at 4pm 
 
Antibiotics per ID Thank you for allowing me to participate in this patients care.  
 
Bernardino Alexander MD, Moni Garcia

## 2020-04-27 NOTE — PROGRESS NOTES
Bedside shift change report given to Radha Oneil RN (oncoming nurse) by Damien Gamino RN (offgoing nurse). Report included the following information SBAR, Kardex, Procedure Summary, Intake/Output, MAR, Accordion, Recent Results, Med Rec Status, Cardiac Rhythm NSR, Paced, Alarm Parameters , Pre Procedure Checklist, Procedure Verification, Quality Measures and Dual Neuro Assessment.

## 2020-04-27 NOTE — ANESTHESIA POSTPROCEDURE EVALUATION
Procedure(s): REMOVE PACEMAKER DUAL LEADS. MAC, total IV anesthesia Anesthesia Post Evaluation Patient location during evaluation: PACU Note status: Adequate. Level of consciousness: responsive to verbal stimuli and sleepy but conscious Pain management: satisfactory to patient Airway patency: patent Anesthetic complications: no 
Cardiovascular status: acceptable Respiratory status: acceptable Hydration status: acceptable Comments: +Post-Anesthesia Evaluation and Assessment Patient: Fernanda Carpio MRN: [id number]  SSN: [id number] YOB: 1960  Age: 60 y.o. Sex: female Cardiovascular Function/Vital Signs /47   Pulse 61   Temp 36.5 °C (97.7 °F)   Resp 17   Ht 5' 10\" (1.778 m)   Wt 106 kg (233 lb 9.6 oz)   SpO2 97%   Breastfeeding No   BMI 33.52 kg/m² Patient is status post Procedure(s): REMOVE PACEMAKER DUAL LEADS. Nausea/Vomiting: Controlled. Postoperative hydration reviewed and adequate. Pain: 
Pain Scale 1: Numeric (0 - 10) (04/27/20 0803) Pain Intensity 1: 0 (04/27/20 0803) Managed. Neurological Status: At baseline. Mental Status and Level of Consciousness: Arousable. Pulmonary Status:  
O2 Device: Room air (04/27/20 1338) Adequate oxygenation and airway patent. Complications related to anesthesia: None Post-anesthesia assessment completed. No concerns. Signed By: Bettie Rizvi MD  
 4/27/2020 Post anesthesia nausea and vomiting:  controlled Vitals Value Taken Time /46 4/27/2020  1:48 PM  
Temp Pulse 60 4/27/2020  1:55 PM  
Resp 14 4/27/2020  1:55 PM  
SpO2 97 % 4/27/2020  1:55 PM  
Vitals shown include unvalidated device data.

## 2020-04-27 NOTE — ANESTHESIA PREPROCEDURE EVALUATION
Relevant Problems No relevant active problems Anesthetic History PONV Review of Systems / Medical History Patient summary reviewed, nursing notes reviewed and pertinent labs reviewed Pulmonary Asthma Neuro/Psych Within defined limits Cardiovascular Hypertension Valvular problems/murmurs: mitral insufficiency CHF Dysrhythmias : atrial fibrillation Pacemaker (PM-sss) and hyperlipidemia Exercise tolerance: >4 METS Comments: Sepsis secondary to left foot osteomyelitis with positive blood cultures and demonstrated vegetation on pacemaker lead by MIKE on 4/23/20. S/P A-fib ablation (2012) Sick Sinus Syndrome MIKE (4/23/20): ·Normal cavity size, wall thickness, systolic function (ejection fraction normal) and diastolic function. Estimated left ventricular ejection fraction is 55 - 60%. No regional wall motion abnormality noted. Normal left ventricular strain. ·Lambl's excrescences visualized on the valve. ·Mitral valve non-specific thickening. Mild to moderate mitral valve regurgitation is present GI/Hepatic/Renal 
  
 
 
Renal disease: CRI Comments: S/P Gastric bypass CRI, Stage IV Endo/Other Diabetes: well controlled, type 2, using insulin Obesity, arthritis and cancer (breast) Pertinent negatives: No morbid obesity Other Findings Comments: Sepsis Non-pressure chronic ulcer of left foot Left foot osteomyelitis Right arm lymphedema Neck and back surgery Physical Exam 
 
Airway Mallampati: II 
TM Distance: 4 - 6 cm Neck ROM: normal range of motion Mouth opening: Normal 
 
 Cardiovascular Regular rate and rhythm,  S1 and S2 normal,  no murmur, click, rub, or gallop Dental 
 
Dentition: Poor dentition and Upper partial plate Comments: Several missing lower teeth, none loose. Pulmonary Breath sounds clear to auscultation Abdominal 
GI exam deferred Other Findings Anesthetic Plan ASA: 3 Anesthesia type: MAC and total IV anesthesia Induction: Intravenous Anesthetic plan and risks discussed with: Patient Propofol MAC

## 2020-04-27 NOTE — PROGRESS NOTES
Hospitalist Progress Note NAME: Petros Emanuel :  1960 MRN:  [id number] Interim Hospital Summary: 60 y.o. female whom presented on 2020 with fever. Assessment / Plan: 
Sepsis secondary to left foot cellulitis and OM left metatarsals 3,4,and 5 
- appreciate podiatrist's input; concerned with prolonged IV ABX tmt. Dr. Lala Sosa will discuss with Vascular service on either a BKA vs Chopart's amputation of left side CT of lower extremity: Large soft tissue ulcer at the base of the fifth metatarsal. No definite evidence of osteomyelitis but if clinical concern is high MRI imaging would 
better evaluate Bacteremia with MSSA 
- continue with ID team's input;  
  BC ()  MSSA -  - LAC 
  BC ()  MSSA -  - RAC 
  BC ()  NG 
  WC () Light  MSSA,few Strep Gp G beta hemolytic ECHO - possible vegetation on AV Pacemaker present - no swelling , erythema MIKE - mobile vegetation on pacer lead Continue with IV vanc Vegetation on pacemaker lead S/p dual lead extraction and device removal 2020 by Dr. Makenzie Zamorano 
Anemia of chronic disease - observe overnight at CCU Follow up H & H 
  INR 1.2 & hgb 8.6 prior to the procedure Transfuse PRN if hgb <7.0 Atrial fibrillation Hypercoagulable state secondary to afib HTN 
SSS 
- Continue Cardizem, doxazosin, hydralazine & metoprolol Warfarin on hold 
  
PARAG on CKD stage IV 
- creat trending down 1.34 from 1.9 on admission Avoid nephrotoxic agents 
  
Type 2 DM with hyperglycemia 
- Hgb A1c 7.5 Check qac/qhs blood glucose and follow SSI 
  
Depression and anxiety 
- Continue sertraline and buspirone  
  
30.0 - 39.9 Obese / Body mass index is 34.87 kg/m². Code status: Full Prophylaxis: SCD's Recommended Disposition:  PT, OT, RN Subjective: Chief Complaint / Reason for Physician Visit \"I feel ok. I am tired. \"  Discussed with RN events overnight. Pt was seen prior to the procedure. Review of Systems: 
Symptom Y/N Comments  Symptom Y/N Comments Fever/Chills n   Chest Pain n   
Poor Appetite    Edema Cough    Abdominal Pain Sputum    Joint Pain SOB/CHOW n   Pruritis/Rash Nausea/vomit n   Tolerating PT/OT Diarrhea n   Tolerating Diet Constipation n   Other Could NOT obtain due to:   
 
Objective: VITALS:  
Last 24hrs VS reviewed since prior progress note. Most recent are: 
Patient Vitals for the past 24 hrs: 
 Temp Pulse Resp BP SpO2  
04/27/20 0306 98 °F (36.7 °C) 65 18 140/53 98 % 04/26/20 2314  71   98 % 04/26/20 2313 98.3 °F (36.8 °C) 71 18 123/42 98 % 04/26/20 2109  65  139/59   
04/26/20 2107  65  139/59   
04/26/20 1941 98 °F (36.7 °C) 60 16 142/48 98 % 04/26/20 1531 98 °F (36.7 °C) 63 16 139/52 98 % 04/26/20 1158 97.9 °F (36.6 °C) 61 15 128/48 97 % 04/26/20 0728 97.3 °F (36.3 °C) 60 16 144/57 97 % Intake/Output Summary (Last 24 hours) at 4/27/2020 9031 Last data filed at 4/27/2020 0400 Gross per 24 hour Intake 300 ml Output 1800 ml Net -1500 ml PHYSICAL EXAM: 
General: Ill appearing. Alert, cooperative, no acute distress EENT:  EOMI. Anicteric sclerae. MMM Resp:  Clear in apex with decreased breath sounds at bases. no wheezing or rales. No accessory muscle use CV:  Regular  rhythm,  No edema GI:  Soft, Non distended, Non tender. +Bowel sounds Neurologic:  Alert and oriented X 3, normal speech, Psych:   Fair insight. Not anxious nor agitated Skin:  No rashes. No jaundice, left foot dressing dry and intact Reviewed most current lab test results and cultures  YES Reviewed most current radiology test results   YES Review and summation of old records today    NO Reviewed patient's current orders and MAR    YES 
PMH/SH reviewed - no change compared to H&P 
________________________________________________________________________ Care Plan discussed with: 
  Comments Patient y Family RN y   
Care Manager Consultant Multidiciplinary team rounds were held today with , nursing, pharmacist and clinical coordinator. Patient's plan of care was discussed; medications were reviewed and discharge planning was addressed. ________________________________________________________________________ Osei Troo NP Procedures: see electronic medical records for all procedures/Xrays and details which were not copied into this note but were reviewed prior to creation of Plan. LABS: 
I reviewed today's most current labs and imaging studies. Pertinent labs include: 
Recent Labs  
  04/27/20 
0304 04/25/20 
0053 WBC 10.6 8.6 HGB 7.9* 7.6* HCT 25.3* 24.3*  
* 367 Recent Labs  
  04/27/20 
0304 04/26/20 
1477 04/25/20 
0053  140 140  
K 3.5 3.7 3.7  111* 111* CO2 23 22 22 * 109* 101* BUN 16 15 18 CREA 1.34* 1.46* 1.38* CA 8.9 8.3* 8.3* INR 1.2* 1.5* 2.6* Signed: )Alaina Antonio NP

## 2020-04-27 NOTE — CONSULTS
Pulmonary, Critical Care, and Sleep Medicine~Consult Note Name: Michael Hernandez MRN: [id number] : 1960 Hospital: [facility] Date: 2020 1:39 PM Admission: 2020 Impression Plan  
1. S/p dual lead pacemaker extraction secondary vegetation. 2. MSSA bacteremia 3. SSS 4. LE cellulitis, left; osteomyelitis? 5. a fib hx; s/p ablation 6. HTN 7. DM  
8. CKD, stable  1. Currently on Vanc; ID is involved 2. Fent/tylenol for pain control 3. monitor electrolytes 4. O2 titration above 90%, on room air 5. BP control 6. Check H/H around 4pm 
7. Discussed with attending and RN Daily Progression: 
 
Admitted to CCU for observation following pacemaker extraction Chest x-ray on 20 showed clear lung fields Only compliant currently is pain. I have reviewed the labs and previous days notes. Pertinent items are noted in HPI. Past Medical History:  
Diagnosis Date  Acquired lymphedema Right arm  Arrhythmia  Asthma  Atrial fibrillation ([facility] Dr. Dani Mariano and Dr. Ben Kaur [facility])  Cancer ([facility] [address] [facility] bilateral breast  
 Chronic kidney disease  Diabetes mellitus type II   
 Dizziness  Essential hypertension  Hyperlipidemia  Hypertension  Long term (current) use of anticoagulants  Morbid obesity ([facility] 3/14/2012  Nausea & vomiting  Neuropathy  Osteoarthritis  Pacemaker  Sick sinus syndrome ([facility] [facility] Past Surgical History:  
Procedure Laterality Date  ABDOMEN SURGERY PROC UNLISTED    
 gastric bypass  GASTRIC BYPASS,OBESE<150CM SAW-EN-Y    
 [place]  HX AFIB ABLATION    
 HX BREAST RECONSTRUCTION Bilateral   
 HX CARPAL TUNNEL RELEASE [address] [address]  RIGHT MODIFIED RADICAL   
 HX MASTECTOMY Left   
 no lymphnode removal  
 HX MOHS PROCEDURES   right  HX ORTHOPAEDIC Right   
 knee surgery  HX PACEMAKER    
 HX PACEMAKER    
 IR INSERT TUNL CVC W PORT OVER 5 YEARS    
 LAMINECTOMY,CERVICAL  1994  
 NEEDLE BIOPSY LIVER,W OTHR [address]  8.21.07  
 MD [address] AND 1994  MD TRABECULOPLASTY BY LASER SURGERY    
 US GUIDE ASP OVARIAN CYST ABD APPROACH  8.21.07  
 RIGHT Prior to Admission medications Medication Sig Start Date End Date Taking? Authorizing Provider  
metOLazone (ZAROXOLYN) 2.5 mg tablet Take 1 tablet po on Mon and Thurs for worsening leg swelling. 4/13/20  Yes Larry Whelan MD  
warfarin (COUMADIN) 4 mg tablet Take 1 tablet on Tues and Sat and a half tablet the other 5 days. 4/10/20  Yes Larry Whelan MD  
bumetanide (BUMEX) 1 mg tablet Take 2 Tabs by mouth daily. 3/26/20  Yes Larry Whelan MD  
insulin glargine (Lantus U-100 Insulin) 100 unit/mL injection Inject 14 units daily 3/26/20  Yes Larry Whelan MD  
metoprolol succinate (TOPROL-XL) 100 mg tablet Take 2 Tabs by mouth daily. 3/26/20  Yes Larry Whelan MD  
dilTIAZem CD (CARDIZEM CD) 180 mg ER capsule Take 1 Cap by mouth daily. 3/22/20  Yes Ale Flor MD  
hydrALAZINE (APRESOLINE) 50 mg tablet Take 1 Tab by mouth three (3) times daily. 3/22/20  Yes Ale Flor MD  
acetaminophen 500 mg tab 500 mg, diphenhydrAMINE 25 mg cap 25 mg Take 2 Doses by mouth nightly. Yes Provider, Historical  
sertraline (ZOLOFT) 50 mg tablet TAKE ONE AND ONE-HALF (1 & 1/2) TABLET BY MOUTH DAILY 8/22/19  Yes Larry Whelan MD  
doxazosin (CARDURA) 4 mg tablet TAKE ONE TABLET BY MOUTH ONCE NIGHTLY 6/14/19  Yes Larry Whelan MD  
busPIRone (BUSPAR) 10 mg tablet TAKE ONE TABLET BY MOUTH TWICE A DAY 5/24/19  Yes Larry Whelan MD  
cholecalciferol, VITAMIN D3, (VITAMIN D3) 5,000 unit tab tablet Take 5,000 Units by mouth daily.    Yes Provider, Historical  
vitamin A 8,000 unit capsule Take 8,000 Units by mouth daily. Yes Provider, Historical  
insulin lispro (HUMALOG) 100 unit/mL kwikpen 2 units with dinner for sugars over 200 1/3/14  Yes Easton Wade MD  
cyanocobalamin (VITAMIN B-12) 1,000 mcg sublingual tablet Take 1,000 mcg by mouth daily. Yes Provider, Historical  
 
Allergies Allergen Reactions  Ace Inhibitors Cough  Amlodipine Swelling  Avapro [Irbesartan] Unable to Obtain  Bactrim [Sulfamethoxazole-Trimethoprim] Other (comments) Sore mouth  Captopril Cough  Carvedilol Diarrhea  Contrast Agent [Iodine] Itching [address]  Morphine Nausea and Vomiting and Swelling  Penicillins Hives Did not tolerate cefepime 3/2020  Pravachol [Pravastatin] Nausea Only  Seafood [Shellfish Containing Products] Hives  Singulair [Montelukast] Other (comments)  
  palpitations Social History Tobacco Use  Smoking status: Never Smoker  Smokeless tobacco: Never Used Substance Use Topics  Alcohol use: Yes Comment: rare Family History Problem Relation Age of Onset  Cancer Mother  Heart Disease Mother  Alcohol abuse Father  Diabetes Brother  Hypertension Brother OBJECTIVE: 
 
 Vital Signs: 
    
Visit Vitals /52 Pulse 70 Temp 98.3 °F (36.8 °C) Resp 18 Ht 5' 10\" (1.778 m) Wt 106 kg (233 lb 9.6 oz) SpO2 98% Breastfeeding No  
BMI 33.52 kg/m² Temp (24hrs), Av.2 °F (36.8 °C), Min:98 °F (36.7 °C), Max:98.3 °F (36.8 °C) Intake/Output:  
  Last shift:  07 - 1900 In: 450 [I.V.:450] Out: 4768 [Urine:1500] Last 3 shifts: 1901 -  0700 In: 1100 [P.O.:1100] Out: 2560 [Urine:2560] Intake/Output Summary (Last 24 hours) at 2020 1339 Last data filed at 2020 1259 Gross per 24 hour Intake 750 ml Output 3375 ml Net -2625 ml Physical Exam:                                       
Exam Findings Other General: No resp distress noted, appears stated age HEENT:  No ulcers, JVD not elevated, no cervical LAD Chest: No pectus deformity, normal chest rise b/l HEART:  RRR Lungs:  CTA b/l, no rhonchi/crackles/wheeze, diminished BS at bases ABD: Soft/NT, non rigid mildly distended EXT: Lower extrem edema, bandage on left leg Skin: No rashes or ulcers, no mottling Neuro: A/O x 3 Medications: 
Current Facility-Administered Medications Medication Dose Route Frequency  [START ON 4/28/2020] metoprolol succinate (TOPROL-XL) XL tablet 50 mg  50 mg Oral DAILY  alcohol 62% (NOZIN) nasal  1 Ampule  1 Ampule Topical Q12H  
 vancomycin (VANCOCIN) 1250 mg in  ml infusion  1,250 mg IntraVENous Q36H  
 fentaNYL citrate (PF) injection 25-50 mcg  25-50 mcg IntraVENous Multiple  midazolam (VERSED) injection 0.5-2 mg  0.5-2 mg IntraVENous Multiple  lidocaine (XYLOCAINE) 2 % viscous solution 15 mL  15 mL Mouth/Throat PRN  
 hydrALAZINE (APRESOLINE) 20 mg/mL injection 10 mg  10 mg IntraVENous Q6H PRN  
 dilTIAZem CD (CARDIZEM CD) capsule 180 mg  180 mg Oral DAILY  insulin glargine (LANTUS) injection 10 Units  10 Units SubCUTAneous DAILY  bumetanide (BUMEX) tablet 2 mg  2 mg Oral DAILY  busPIRone (BUSPAR) tablet 10 mg  10 mg Oral BID  doxazosin (CARDURA) tablet 4 mg  4 mg Oral QHS  hydrALAZINE (APRESOLINE) tablet 50 mg  50 mg Oral TID  sertraline (ZOLOFT) tablet 50 mg  50 mg Oral DAILY  acetaminophen (TYLENOL) tablet 650 mg  650 mg Oral Q6H PRN  
 glucose chewable tablet 16 g  4 Tab Oral PRN  
 dextrose (D50W) injection syrg 12.5-25 g  25-50 mL IntraVENous PRN  
 glucagon (GLUCAGEN) injection 1 mg  1 mg IntraMUSCular PRN  
 insulin lispro (HUMALOG) injection   SubCUTAneous AC&HS  sodium chloride (NS) flush 5-10 mL  5-10 mL IntraVENous PRN Facility-Administered Medications Ordered in Other Encounters Medication Dose Route Frequency  midazolam (VERSED) injection   IntraVENous PRN  
 0.9% sodium chloride infusion   IntraVENous CONTINUOUS  propofoL (DIPRIVAN) 10 mg/mL injection   IntraVENous CONTINUOUS  
 fentaNYL citrate (PF) injection   IntraVENous PRN  
 PHENYLephrine (NEOSYNEPHRINE) in NS syringe   IntraVENous PRN Labs: ABG No results for input(s): PHI, PCO2I, PO2I, HCO3I, SO2I, FIO2I in the last 72 hours. CBC Recent Labs  
  04/27/20 
0304 04/25/20 
0053 WBC 10.6 8.6 HGB 7.9* 7.6* HCT 25.3* 24.3*  
* 367 MCV 93.0 91.4 MCH 29.0 28.6 Metabolic Panel Recent Labs  
  04/27/20 
0304 04/26/20 
8471 04/25/20 
0053  140 140  
K 3.5 3.7 3.7  111* 111* CO2 23 22 22 * 109* 101* BUN 16 15 18 CREA 1.34* 1.46* 1.38* CA 8.9 8.3* 8.3* INR 1.2* 1.5* 2.6* Pertinent Labs Soren Horne PA-C 
4/27/2020

## 2020-04-27 NOTE — PROGRESS NOTES
Cardiology Progress Note [address]  [phone] 4/27/2020 11:12 AM 
 
Admit Date: 4/19/2020 Admit Diagnosis: Sepsis ([address] [A41.9] Subjective:  
 
Lloyd Pimentel   denies chest pain. Visit Vitals /48 Pulse 72 Temp 98.3 °F (36.8 °C) Resp 18 Ht 5' 10\" (1.778 m) Wt 233 lb 9.6 oz (106 kg) SpO2 99% Breastfeeding No  
BMI 33.52 kg/m² Current Facility-Administered Medications Medication Dose Route Frequency  vancomycin (VANCOCIN) 1250 mg in  ml infusion  1,250 mg IntraVENous Q36H  
 fentaNYL citrate (PF) injection 25-50 mcg  25-50 mcg IntraVENous Multiple  midazolam (VERSED) injection 0.5-2 mg  0.5-2 mg IntraVENous Multiple  lidocaine (XYLOCAINE) 2 % viscous solution 15 mL  15 mL Mouth/Throat PRN  
 hydrALAZINE (APRESOLINE) 20 mg/mL injection 10 mg  10 mg IntraVENous Q6H PRN  
 dilTIAZem CD (CARDIZEM CD) capsule 180 mg  180 mg Oral DAILY  insulin glargine (LANTUS) injection 10 Units  10 Units SubCUTAneous DAILY  bumetanide (BUMEX) tablet 2 mg  2 mg Oral DAILY  busPIRone (BUSPAR) tablet 10 mg  10 mg Oral BID  doxazosin (CARDURA) tablet 4 mg  4 mg Oral QHS  hydrALAZINE (APRESOLINE) tablet 50 mg  50 mg Oral TID  metoprolol succinate (TOPROL-XL) XL tablet 200 mg  200 mg Oral DAILY  sertraline (ZOLOFT) tablet 50 mg  50 mg Oral DAILY  acetaminophen (TYLENOL) tablet 650 mg  650 mg Oral Q6H PRN  
 glucose chewable tablet 16 g  4 Tab Oral PRN  
 dextrose (D50W) injection syrg 12.5-25 g  25-50 mL IntraVENous PRN  
 glucagon (GLUCAGEN) injection 1 mg  1 mg IntraMUSCular PRN  
 insulin lispro (HUMALOG) injection   SubCUTAneous AC&HS  sodium chloride (NS) flush 5-10 mL  5-10 mL IntraVENous PRN Objective:  
  
Visit Vitals /48 Pulse 72 Temp 98.3 °F (36.8 °C) Resp 18 Ht 5' 10\" (1.778 m) Wt 233 lb 9.6 oz (106 kg) SpO2 99% Breastfeeding No  
BMI 33.52 kg/m² Physical Exam: Abdomen: soft, non-tender Extremities: extremities normal 
Heart: regular rate and rhythm Lungs: clear to auscultation bilaterally Pulses: 2+ and symmetric Data Review:  
Labs:   
Recent Labs  
  04/27/20 
0304 04/25/20 
0053 WBC 10.6 8.6 HGB 7.9* 7.6* HCT 25.3* 24.3*  
* 367 Recent Labs  
  04/27/20 
0304 04/26/20 
2890 04/25/20 
0053  140 140  
K 3.5 3.7 3.7  111* 111* CO2 23 22 22 * 109* 101* BUN 16 15 18 CREA 1.34* 1.46* 1.38* CA 8.9 8.3* 8.3* INR 1.2* 1.5* 2.6* No results for input(s): TROIQ, CPK, CKMB in the last 72 hours. Intake/Output Summary (Last 24 hours) at 4/27/2020 1112 Last data filed at 4/27/2020 4411 Gross per 24 hour Intake 300 ml Output 2700 ml Net -2400 ml Telemetry: nsr Assessment:  
 
Active Problems: 
  Sick sinus syndrome ([address] (4/2/2012) Overview: With 5 second and greater pauses, dual chamber pacemaker placement and  
    loop recorder explant done>medtronic device Diabetic ulcer of left heel associated with type 2 diabetes mellitus, with fat layer exposed ([address] (6/21/2019) Left leg cellulitis (3/8/2020) Sepsis ([address] (4/20/2020) Plan:  
 
Ester England 's inr is 1.2 after vit K. Spoke to her about lead extraction again. She is aware of the increased risk of mortality. Spoke to Dr Mukesh Teixeira - he is aware and the cardiac OR is on standby. Patient has been type and crossed. I discussed the risks/benefits/alternatives of the procedure with the patient. Risks include (but are not limited to) bleeding, heart block, infection, cva/mi/tamponade/death. The patient understands and agrees to proceed. Miles Fontaine MD, [facility] 
 
4/27/2020

## 2020-04-28 NOTE — PROGRESS NOTES
Pulmonary, Critical Care, and Sleep Medicine~Consult Note Name: Priya Sarabia MRN: [id number] : 1960 Hospital: [facility] Date: 2020 1:39 PM Admission: 2020 Impression Plan  
1. S/p dual lead pacemaker extraction secondary vegetation. 2. MSSA bacteremia 3. SSS 4. LE cellulitis, left; osteomyelitis? 5. a fib hx; s/p ablation 6. HTN 7. DM  
8. CKD, stable  1. Currently on Vanc; ID is involved 2. Fent/tylenol for pain control 3. monitor electrolytes 4. O2 titration above 90%, on room air 5. BP control 6. Check H/H around 4pm 
7. Discussed with attending and RN   
8. Mobilize 9. CXR now- if good, will move out of CCU 10. Anticoagulation per Cardiology  has been on coumadin. Failed pocket replacement that was redone. Not currently paced. Still with pain. Site dry and not obvious enlarging hematoma. Hgb ok. Platelets higher Admitted to CCU for observation following pacemaker extraction Chest x-ray on 20 showed clear lung fields Only compliant currently is pain. I have reviewed the labs and previous days notes. Pertinent items are noted in HPI. Past Medical History:  
Diagnosis Date  Acquired lymphedema Right arm  Arrhythmia  Asthma  Atrial fibrillation ([facility] [address] Dr. Sofia Cosme and Dr. Jolie Sifuentes [facility])  Cancer ([facility] [address] bilateral breast  
 Chronic kidney disease  Diabetes mellitus type II   
 Dizziness  Essential hypertension  Hyperlipidemia  Hypertension  Long term (current) use of anticoagulants  Morbid obesity ([facility] [address] 3/14/2012  Nausea & vomiting  Neuropathy  Osteoarthritis  Pacemaker  Sick sinus syndrome ([facility] [address] Past Surgical History:  
Procedure Laterality Date  ABDOMEN SURGERY PROC UNLISTED    
 gastric bypass  GASTRIC BYPASS,OBESE<150CM SAW-EN-Y    
 [place]  HX AFIB ABLATION    
 HX BREAST RECONSTRUCTION Bilateral   
 HX CARPAL TUNNEL RELEASE [address] [facility] [address] [facility] [address] [facility] RIGHT MODIFIED RADICAL   
 HX MASTECTOMY Left   
 no lymphnode removal  
 HX MOHS PROCEDURES  1995  
 right  HX ORTHOPAEDIC Right   
 knee surgery  HX PACEMAKER    
 HX PACEMAKER    
 IR INSERT TUNL CVC W PORT OVER 5 YEARS    
 LAMINECTOMY,CERVICAL  1994  
 NEEDLE BIOPSY LIVER,W OTHR [address]  8.21.07  
 DC [address] AND 1994  DC RELOCATION OF SKIN POCKET FOR PACEMAKER N/A 4/24/2020 RELOCATE [address] performed by Arnie Bose MD at [address] CATH LAB  DC RMVL TRANSVNS PM ELTRD 1 LEAD SYS ATR/VENTR N/A 4/24/2020 Remove Pacemaker Dual Leads performed by Arnie Bose MD [address]  CARDIAC CATH LAB  DC TRABECULOPLASTY BY LASER SURGERY    
 US GUIDE ASP OVARIAN CYST ABD APPROACH  8.21.07  
 RIGHT Prior to Admission medications Medication Sig Start Date End Date Taking? Authorizing Provider  
metOLazone (ZAROXOLYN) 2.5 mg tablet Take 1 tablet po on Mon and Thurs for worsening leg swelling. 4/13/20  Yes Kia Michelle MD  
warfarin (COUMADIN) 4 mg tablet Take 1 tablet on Tues and Sat and a half tablet the other 5 days. 4/10/20  Yes Kia Michelle MD  
bumetanide (BUMEX) 1 mg tablet Take 2 Tabs by mouth daily. 3/26/20  Yes Kia Michelle MD  
insulin glargine (Lantus U-100 Insulin) 100 unit/mL injection Inject 14 units daily 3/26/20  Yes Kia Michelle MD  
metoprolol succinate (TOPROL-XL) 100 mg tablet Take 2 Tabs by mouth daily. 3/26/20  Yes Kia Michelle MD  
dilTIAZem CD (CARDIZEM CD) 180 mg ER capsule Take 1 Cap by mouth daily. 3/22/20  Yes Gino Allen MD  
hydrALAZINE (APRESOLINE) 50 mg tablet Take 1 Tab by mouth three (3) times daily.  3/22/20  Yes Gino Allen MD  
acetaminophen 500 mg tab 500 mg, diphenhydrAMINE 25 mg cap 25 mg Take 2 Doses by mouth nightly. Yes Provider, Historical  
sertraline (ZOLOFT) 50 mg tablet TAKE ONE AND ONE-HALF (1 & 1/2) TABLET BY MOUTH DAILY 8/22/19  Yes Vic Ornelas MD  
doxazosin (CARDURA) 4 mg tablet TAKE ONE TABLET BY MOUTH ONCE NIGHTLY 6/14/19  Yes Vic Ornelas MD  
busPIRone (BUSPAR) 10 mg tablet TAKE ONE TABLET BY MOUTH TWICE A DAY 5/24/19  Yes Vic Ornelas MD  
cholecalciferol, VITAMIN D3, (VITAMIN D3) 5,000 unit tab tablet Take 5,000 Units by mouth daily. Yes Provider, Historical  
vitamin A 8,000 unit capsule Take 8,000 Units by mouth daily. Yes Provider, Historical  
insulin lispro (HUMALOG) 100 unit/mL kwikpen 2 units with dinner for sugars over 200 1/3/14  Yes Vic Ornelas MD  
cyanocobalamin (VITAMIN B-12) 1,000 mcg sublingual tablet Take 1,000 mcg by mouth daily. Yes Provider, Historical  
 
Allergies Allergen Reactions  Ace Inhibitors Cough  Amlodipine Swelling  Avapro [Irbesartan] Unable to Obtain  Bactrim [Sulfamethoxazole-Trimethoprim] Other (comments) Sore mouth  Captopril Cough  Carvedilol Diarrhea  Contrast Agent [Iodine] Itching [address]  Morphine Nausea and Vomiting and Swelling  Penicillins Hives Did not tolerate cefepime 3/2020  Pravachol [Pravastatin] Nausea Only  Seafood [Shellfish Containing Products] Hives  Singulair [Montelukast] Other (comments)  
  palpitations Social History Tobacco Use  Smoking status: Never Smoker  Smokeless tobacco: Never Used Substance Use Topics  Alcohol use: Yes Comment: rare Family History Problem Relation Age of Onset  Cancer Mother  Heart Disease Mother  Alcohol abuse Father  Diabetes Brother  Hypertension Brother OBJECTIVE: 
 
 Vital Signs: 
    
Visit Vitals /59 Pulse 67 Temp 97.9 °F (36.6 °C) Resp 20 Ht 5' 10\" (1.778 m) Wt 106 kg (233 lb 9.6 oz) SpO2 98% Breastfeeding No  
BMI 33.52 kg/m² Temp (24hrs), Av °F (36.7 °C), Min:97.4 °F (36.3 °C), Max:98.8 °F (37.1 °C) Intake/Output:  
  Last shift: No intake/output data recorded. Last 3 shifts:  1901 -  0700 In: 4131 [P.O.:660; I.V.:1150] Out: 4685 [TFSIU:3675] Intake/Output Summary (Last 24 hours) at 2020 6595 Last data filed at 2020 3317 Gross per 24 hour Intake 1510 ml Output 2975 ml Net -1465 ml Physical Exam:                                       
Exam Findings Other General: No resp distress noted, appears stated age HEENT:  No ulcers, JVD not elevated, no cervical LAD Chest: No pectus deformity, normal chest rise b/l HEART:  RRR Lungs:  CTA b/l, no rhonchi/crackles/wheeze, diminished BS at bases ABD: Soft/NT, non rigid mildly distended EXT: Lower extrem edema, bandage on left leg Skin: No rashes or ulcers, no mottling Neuro: A/O x 3 Medications: 
Current Facility-Administered Medications Medication Dose Route Frequency  metoprolol succinate (TOPROL-XL) XL tablet 50 mg  50 mg Oral DAILY  alcohol 62% (NOZIN) nasal  1 Ampule  1 Ampule Topical Q12H  
 oxyCODONE-acetaminophen (PERCOCET) 5-325 mg per tablet 1 Tab  1 Tab Oral Q4H PRN  
 vancomycin (VANCOCIN) 1250 mg in  ml infusion  1,250 mg IntraVENous Q36H  
 fentaNYL citrate (PF) injection 25-50 mcg  25-50 mcg IntraVENous Multiple  midazolam (VERSED) injection 0.5-2 mg  0.5-2 mg IntraVENous Multiple  lidocaine (XYLOCAINE) 2 % viscous solution 15 mL  15 mL Mouth/Throat PRN  
 hydrALAZINE (APRESOLINE) 20 mg/mL injection 10 mg  10 mg IntraVENous Q6H PRN  
 dilTIAZem CD (CARDIZEM CD) capsule 180 mg  180 mg Oral DAILY  insulin glargine (LANTUS) injection 10 Units  10 Units SubCUTAneous DAILY  bumetanide (BUMEX) tablet 2 mg  2 mg Oral DAILY  busPIRone (BUSPAR) tablet 10 mg  10 mg Oral BID  doxazosin (CARDURA) tablet 4 mg  4 mg Oral QHS  hydrALAZINE (APRESOLINE) tablet 50 mg  50 mg Oral TID  sertraline (ZOLOFT) tablet 50 mg  50 mg Oral DAILY  glucose chewable tablet 16 g  4 Tab Oral PRN  
 dextrose (D50W) injection syrg 12.5-25 g  25-50 mL IntraVENous PRN  
 glucagon (GLUCAGEN) injection 1 mg  1 mg IntraMUSCular PRN  
 insulin lispro (HUMALOG) injection   SubCUTAneous AC&HS  sodium chloride (NS) flush 5-10 mL  5-10 mL IntraVENous PRN Labs: ABG No results for input(s): PHI, PCO2I, PO2I, HCO3I, SO2I, FIO2I in the last 72 hours. CBC Recent Labs  
  04/28/20 
0457 04/27/20 
1630 04/27/20 
0304 WBC 8.8 8.8 10.6 HGB 8.3* 8.6* 7.9*  
HCT 26.8* 27.2* 25.3*  
* 293 482* MCV 92.1 92.2 93.0 MCH 28.5 29.2 29.0 Metabolic Panel Recent Labs  
  04/28/20 
0457 04/27/20 
0304 04/26/20 
3704  138 140  
K 3.5 3.5 3.7 * 108 111* CO2 26 23 22 GLU 95 105* 109* BUN 18 16 15 CREA 1.35* 1.34* 1.46* CA 8.6 8.9 8.3*  
MG 2.2  --   --   
INR 1.2* 1.2* 1.5* Pertinent Labs Sarah Beth Jasso MD 
4/28/2020

## 2020-04-28 NOTE — PROGRESS NOTES
1900-Bedside and Verbal shift change report given to AARON Bangura RN (oncoming nurse) by Gisela Álvarez RN (offgoing nurse). Report included the following information SBAR, Kardex, ED Summary, OR Summary, Procedure Summary, Intake/Output, MAR, Accordion, Recent Results, Med Rec Status and Cardiac Rhythm NSR SB. Patient had pacemaker extracted, pt has a hx of SSS- will monitor closely. 2000- pt assessed see flowsheet- 
 
0000- no changes in assessment 
 
0400- assessment completed- no changes.   
 
0700- report given to Myrna Harrell RN

## 2020-04-28 NOTE — PROGRESS NOTES
Infectious Disease Progress IMPRESSION:  
 
- Sepsis - MSSA Bacteremia & R/sided endocarditis 
  with pacemaker lead infection BC - 4/19-  MSSA - 1/1 - LAC BC-  4/19-  MSSA - 1/1 - RAC BC-  4/21-  NG 
  -S/p removal of dual lead Pacemaker on 4/27 MIKE - mobile vegetation on pacer lead Initial lead extraction attempted unsuccessful on 4/24 
   - Cellulitis of LLE , recurrent Previous admissions for same - 3/8-3/22, 1/14-1/20 RLE cellulitis - 12/6-12/9/19 
 - Diabetic foot ulcer , probable OM of foot CT L/foot - no definite evidence of OM 
  WC - 4/20- Light  MSSA,few Strep Gp G beta hemolytic Previous WC - 3/10- MSSA , Proteus mirabilis Xray - 
: Soft tissue wound at the level of the third through fifth proximal 
metatarsals. Underlying cortical irregularity and lucency within the proximal 
third through fifth metatarsals may represent osteomyelitis. - CKD 4 Cr 1.35 
-Atial fibrillation - ESR / CRP 85/14.4  
- Diabetes mellitus with neuropathy A1c 7.5 
- Penicillin allergy - hives PLAN:  
  
 -  Vancomycin IV x 6 weeks end date 6/8/20  
  - Pharmacy on Consult for antibiotic dosing, target Vanc trough 15 - Monitor Cr, Vanc trough - Weekly CBC, CMP & ESR/ CRP every other week  Fax reports to [phone], call with abnormal results at [phone]. Call with antibiotic related adverse events - Podiatry / Vascular recommendations Subjective:  
 
Pt seen . Feels better overall No fever , chills after procedure Patient Active Problem List  
Diagnosis Code  History of breast cancer Z85.3  Asthma J45.909  Fe deficiency anemia D50.9  Status post ablation of atrial fibrillation Z98.890, Z86.79  
 Sick sinus syndrome (HCC) I49.5  S/P cardiac pacemaker procedure Z95.0  Long term (current) use of anticoagulants Z79.01  
 Mixed hyperlipidemia E78.2  CKD (chronic kidney disease), stage IV (HCC) N18.4  Systolic murmur F20.1  Essential hypertension I10  
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  
 Diabetic ulcer of right midfoot associated with type 2 diabetes mellitus, with fat layer exposed ([address] E11.621, E89.677  Foot deformity, acquired, left M21.962  Foot deformity, right M21.961  Pes cavus Q66.70  Type 2 diabetes mellitus with left diabetic foot infection ([facility]) E11.628, L08.9  Traumatic hematoma of foot, left, initial encounter U06.11RA  Diabetic ulcer of left midfoot with necrosis of muscle ([address] E11.621, O98.266  Left leg cellulitis [address]  Atrial fibrillation with RVR ([facility]) I48.91  
 H/O bilateral mastectomy Z90.13  Sepsis ([address] A41.9 Past Medical History:  
Diagnosis Date  Acquired lymphedema Right arm  Arrhythmia  Asthma  Atrial fibrillation ([address] Dr. Jannette Baldwin and Dr. Travis alves [facility])  Cancer ([address] bilateral breast  
 Chronic kidney disease  Diabetes mellitus type II   
 Dizziness  Essential hypertension  Hyperlipidemia  Hypertension  Long term (current) use of anticoagulants  Morbid obesity ([address] 3/14/2012  Nausea & vomiting  Neuropathy  Osteoarthritis  Pacemaker  Sick sinus syndrome ([facility] Family History Problem Relation Age of Onset  Cancer Mother  Heart Disease Mother  Alcohol abuse Father  Diabetes Brother  Hypertension Brother Social History Tobacco Use  Smoking status: Never Smoker  Smokeless tobacco: Never Used Substance Use Topics  Alcohol use: Yes Comment: rare Past Surgical History:  
Procedure Laterality Date  ABDOMEN SURGERY PROC UNLISTED    
 gastric bypass  GASTRIC BYPASS,OBESE<150CM SAW-EN-Y  7/08  
 [place]  HX AFIB ABLATION    
 HX BREAST RECONSTRUCTION Bilateral   
 HX CARPAL TUNNEL RELEASE [address] [facility] [address] [facility] [address] [place] [address] RIGHT MODIFIED RADICAL   
 HX MASTECTOMY Left   
 no lymphnode removal  
 HX MOHS PROCEDURES  1995  
 right  HX ORTHOPAEDIC Right   
 knee surgery  HX PACEMAKER    
 HX PACEMAKER    
 IR INSERT TUNL CVC W PORT OVER 5 YEARS    
 LAMINECTOMY,CERVICAL  1994  
 NEEDLE BIOPSY LIVER,W OTHR [address]  8.21.07  
 RI [address] AND 1994  RI RELOCATION OF SKIN POCKET FOR PACEMAKER N/A 4/24/2020 RELOCATE 2 [facility] [place] [address] CATH LAB  RI RMVL TRANSVNS PM ELTRD 1 LEAD SYS ATR/VENTR N/A 4/24/2020 Remove Pacemaker Dual Leads performed by Bryon Essex, MD at [facility] CARDIAC CATH LAB  RI RMVL TRANSVNS PM ELTRD 1 LEAD SYS ATR/VENTR N/A 4/27/2020 REMOVE PACEMAKER DUAL LEADS performed by [place] at [address] CATH LAB  RI TRABECULOPLASTY BY LASER SURGERY    
 US GUIDE ASP OVARIAN CYST ABD APPROACH  8.21.07  
 RIGHT Prior to Admission medications Medication Sig Start Date End Date Taking? Authorizing Provider  
metOLazone (ZAROXOLYN) 2.5 mg tablet Take 1 tablet po on Mon and Thurs for worsening leg swelling.  4/13/20  Yes Giuliana Sylvester MD  
warfarin (COUMADIN) 4 mg tablet Take 1 tablet on Tues and Sat and a half tablet the other 5 days. 4/10/20  Yes Giuliana Daily, MD  
bumetanide (BUMEX) 1 mg tablet Take 2 Tabs by mouth daily. 3/26/20  Yes Giuliana Sylvester, MD  
insulin glargine (Lantus U-100 Insulin) 100 unit/mL injection Inject 14 units daily 3/26/20  Yes Giuliana Daily, MD  
metoprolol succinate (TOPROL-XL) 100 mg tablet Take 2 Tabs by mouth daily. 3/26/20  Yes Giuliana Daily, MD  
dilTIAZem CD (CARDIZEM CD) 180 mg ER capsule Take 1 Cap by mouth daily. 3/22/20  Yes Nina Tiwari MD  
hydrALAZINE (APRESOLINE) 50 mg tablet Take 1 Tab by mouth three (3) times daily. 3/22/20  Yes Nina Tiwari MD  
acetaminophen 500 mg tab 500 mg, diphenhydrAMINE 25 mg cap 25 mg Take 2 Doses by mouth nightly. Yes Provider, Historical  
sertraline (ZOLOFT) 50 mg tablet TAKE ONE AND ONE-HALF (1 & 1/2) TABLET BY MOUTH DAILY 8/22/19  Yes Giuliana Sylvester, MD  
doxazosin (CARDURA) 4 mg tablet TAKE ONE TABLET BY MOUTH ONCE NIGHTLY 6/14/19  Yes Giuliana Daily, MD  
busPIRone (BUSPAR) 10 mg tablet TAKE ONE TABLET BY MOUTH TWICE A DAY 5/24/19  Yes Giuliana Sylvester, MD  
cholecalciferol, VITAMIN D3, (VITAMIN D3) 5,000 unit tab tablet Take 5,000 Units by mouth daily. Yes Provider, Historical  
vitamin A 8,000 unit capsule Take 8,000 Units by mouth daily. Yes Provider, Historical  
insulin lispro (HUMALOG) 100 unit/mL kwikpen 2 units with dinner for sugars over 200 1/3/14  Yes Giuliana Sylvester, MD  
cyanocobalamin (VITAMIN B-12) 1,000 mcg sublingual tablet Take 1,000 mcg by mouth daily. Yes Provider, Historical  
 
Allergies Allergen Reactions  Ace Inhibitors Cough  Amlodipine Swelling  Avapro [Irbesartan] Unable to Obtain  Bactrim [Sulfamethoxazole-Trimethoprim] Other (comments) Sore mouth  Captopril Cough  Carvedilol Diarrhea  Contrast Agent [Iodine] Itching [address]  Morphine Nausea and Vomiting and Swelling  Penicillins Hives Did not tolerate cefepime 3/2020  Pravachol [Pravastatin] Nausea Only  Seafood [Shellfish Containing Products] Hives  Singulair [Montelukast] Other (comments)  
  palpitations Review of Systems:  A comprehensive review of systems was negative except for that written in the History of Present Illness. 10 point review of systems obtained . All other systems negative Objective:  
Blood pressure 145/57, pulse 63, temperature 98.7 °F (37.1 °C), resp. rate 16, height 5' 10\" (1.778 m), weight 233 lb 9.6 oz (106 kg), SpO2 98 %, not currently breastfeeding. Temp (24hrs), Av.2 °F (36.8 °C), Min:97.8 °F (36.6 °C), Max:98.8 °F (37.1 °C) Current Facility-Administered Medications Medication Dose Route Frequency  [START ON 2020] VANCOMYCIN INFORMATION NOTE   Other ONCE  
 metoprolol succinate (TOPROL-XL) XL tablet 50 mg  50 mg Oral DAILY  alcohol 62% (NOZIN) nasal  1 Ampule  1 Ampule Topical Q12H  
 oxyCODONE-acetaminophen (PERCOCET) 5-325 mg per tablet 1 Tab  1 Tab Oral Q4H PRN  
 vancomycin (VANCOCIN) 1250 mg in  ml infusion  1,250 mg IntraVENous Q36H  
 fentaNYL citrate (PF) injection 25-50 mcg  25-50 mcg IntraVENous Multiple  midazolam (VERSED) injection 0.5-2 mg  0.5-2 mg IntraVENous Multiple  lidocaine (XYLOCAINE) 2 % viscous solution 15 mL  15 mL Mouth/Throat PRN  
 hydrALAZINE (APRESOLINE) 20 mg/mL injection 10 mg  10 mg IntraVENous Q6H PRN  
 dilTIAZem CD (CARDIZEM CD) capsule 180 mg  180 mg Oral DAILY  insulin glargine (LANTUS) injection 10 Units  10 Units SubCUTAneous DAILY  bumetanide (BUMEX) tablet 2 mg  2 mg Oral DAILY  busPIRone (BUSPAR) tablet 10 mg  10 mg Oral BID  doxazosin (CARDURA) tablet 4 mg  4 mg Oral QHS  hydrALAZINE (APRESOLINE) tablet 50 mg  50 mg Oral TID  sertraline (ZOLOFT) tablet 50 mg  50 mg Oral DAILY  glucose chewable tablet 16 g  4 Tab Oral PRN  
 dextrose (D50W) injection syrg 12.5-25 g  25-50 mL IntraVENous PRN  
 glucagon (GLUCAGEN) injection 1 mg  1 mg IntraMUSCular PRN  
 insulin lispro (HUMALOG) injection   SubCUTAneous AC&HS  sodium chloride (NS) flush 5-10 mL  5-10 mL IntraVENous PRN Exam:   
General:  Awake cooperative, Eyes:  Sclera anicteric. Pupils equally round and reactive to light. Mouth/Throat: Mucous membranes normal, oral pharynx clear Neck: Supple Lungs:   Clear to auscultation CV:  Regular rate and rhythm,no murmur, click, rub or gallop Abdomen:   Soft, non-tender. bowel sounds normal. non-distended Extremities: No cyanosis or edema Skin: Skin color, texture, turgor normal. no acute rash or lesions Lymph nodes: Cervical and supraclavicular normal  
Musculoskeletal: LLE swelling, warm, foot dressing + Lines/Devices:  Intact, no erythema, drainage or tenderness Psych: Alert and oriented, normal mood affect Data Review: CBC:  
Recent Labs  
  04/28/20 
0457 04/27/20 
1630 04/27/20 
0304 WBC 8.8 8.8 10.6 RBC 2.91* 2.95* 2.72* HGB 8.3* 8.6* 7.9*  
HCT 26.8* 27.2* 25.3*  
* 293 482* [address] 75  --   
LYMPH 11* 11*  --   
EOS 2 2  --   
 
CMP:  
Recent Labs  
  04/28/20 
0457 04/27/20 
0304 04/26/20 
2173 GLU 95 105* 109*  138 140  
K 3.5 3.5 3.7 * 108 111* CO2 26 23 22 BUN 18 16 15 CREA 1.35* 1.34* 1.46* CA 8.6 8.9 8.3* AGAP 6 7 7 BUCR 13 12 10* Lab Results Component Value Date/Time Culture result: NO GROWTH 6 DAYS 04/21/2020 08:50 AM  
 Culture result: NO ANAEROBES ISOLATED 04/21/2020 07:20 AM  
 Culture result: LIGHT DIPHTHEROIDS (A) 04/21/2020 07:20 AM  
 Culture result: LIGHT STAPHYLOCOCCUS AUREUS (A) 04/20/2020 05:48 PM  
 Culture result: (A) 04/20/2020 05:48 PM  
  FEW STREPTOCOCCI, BETA HEMOLYTIC GROUP G Penicillin and ampicillin are drugs of choice for treatment of beta-hemolytic streptococcal infections.  Susceptibility testing of penicillins and beta-lactams approved by the FDA for treatment of beta-hemolytic streptococcal infections need not be performed routinely, because nonsusceptible isolates are extremely rare. CLSI 2012 XR Results (most recent): 
Results from Hospital Encounter encounter on 04/19/20 XR CHEST PORT Narrative Portable chest single view dated 4/20/2020 Comparison chest dated 4/19/2020 History is status post pacemaker change. A single portable AP upright view of the chest was obtained. The patient is 
slightly rotated towards the right. It is difficult to accurately assess the 
size of the cardiac silhouette. There has been interval removal of the cardiac 
pacer device and leads which were present on the left. A metallic wire projects 
over the upper portion of the right hemithorax. There is a suggestion of some 
increased density in the medial aspect of the upper portion of the right 
hemithorax. This may be associated with the brachiocephalic vasculature. A 
follow-up, true frontal examination of the chest may prove useful. Surgical 
clips project over the right lung base. The costophrenic angles are sharp in 
appearance. There is evidence of postsurgical change involving the cervical 
spine. Impression IMPRESSION: 
1. Interval removal of the cardiac pacer device and leads as described above. 2. Presence of a radiopaque wire which projects over the upper portion of the 
right hemithorax. ICD-10-CM ICD-9-CM 1. Ulcer of left foot, unspecified ulcer stage ([address] L97.529 707.15   
2. Osteomyelitis of left foot, unspecified type ([address] M86.9 730.27 3. Cellulitis of left lower extremity L03.116 682.6 4. Disorder of cardiac pacemaker system, subsequent encounter T82. 9XXD V58.89 ELECTROPHYSIOLOGY PROCEDURE  
  996.72 ELECTROPHYSIOLOGY PROCEDURE  
   ELECTROPHYSIOLOGY PROCEDURE  
   ELECTROPHYSIOLOGY PROCEDURE  
   CANCELED: INVASIVE VASCULAR PROCEDURE  
   CANCELED: INVASIVE VASCULAR PROCEDURE Antibiotic History I have discussed the diagnosis with the patient and the intended plan as seen in the above orders. I have discussed medication side effects and warnings with the patient as well. Reviewed test results at length with patient Signed By: Coleen Mcnair MD FACP

## 2020-04-28 NOTE — PROGRESS NOTES
Problem: Falls - Risk of 
Goal: *Absence of Falls Description: Document Giuliano Gomez Fall Risk and appropriate interventions in the flowsheet. Outcome: Progressing Towards Goal 
Note: Fall Risk Interventions: 
Mobility Interventions: Assess mobility with egress test 
 
  
 
Medication Interventions: Bed/chair exit alarm Elimination Interventions: Call light in reach

## 2020-04-28 NOTE — PROGRESS NOTES
LLE examined and imaging reviewed If limb salvage options have been exhausted, left BKA only surgical option. Compromised skin of heel precludes Syme. Will discuss with Dr Von Garcia and pt.

## 2020-04-28 NOTE — PROGRESS NOTES
3900 Bedside and Verbal shift change report given to Tamar Church RN (oncoming nurse) by Isabela Haddad RN (offgoing nurse). Report included the following information SBAR, Kardex, MAR and Recent Results. 1053 TRANSFER - OUT REPORT: 
 
Verbal report given to [facility] RN(name) on Keith Lopez  being transferred to [facility](unit) for routine progression of care Report consisted of patients Situation, Background, Assessment and  
Recommendations(SBAR). Information from the following report(s) SBAR, Kardex and MAR was reviewed with the receiving nurse. Lines:  
Peripheral IV Left External jugular (Active) Site Assessment Clean, dry, & intact 4/28/2020  7:45 AM  
Phlebitis Assessment 0 4/28/2020  7:45 AM  
Infiltration Assessment 0 4/28/2020  7:45 AM  
Dressing Status Clean, dry, & intact 4/28/2020  7:45 AM  
Dressing Type Tape;Transparent 4/28/2020  7:45 AM  
Hub Color/Line Status Flushed 4/28/2020  4:00 AM  
Action Taken Open ports on tubing capped 4/27/2020  8:00 PM  
Alcohol Cap Used No 4/28/2020  4:00 AM  
  
 
Opportunity for questions and clarification was provided. Blood glucose 119

## 2020-04-28 NOTE — PROGRESS NOTES
Problem: Falls - Risk of 
Goal: *Absence of Falls Description: Document Anaid Mendez Fall Risk and appropriate interventions in the flowsheet. Outcome: Progressing Towards Goal 
Note: Fall Risk Interventions: 
Mobility Interventions: Assess mobility with egress test, Patient to call before getting OOB Medication Interventions: Bed/chair exit alarm, Patient to call before getting OOB Elimination Interventions: Call light in reach, Patient to call for help with toileting needs Problem: Patient Education: Go to Patient Education Activity Goal: Patient/Family Education Outcome: Progressing Towards Goal 
  
Problem: Pressure Injury - Risk of 
Goal: *Prevention of pressure injury Description: Document Valdemar Scale and appropriate interventions in the flowsheet. Outcome: Progressing Towards Goal 
Note: Pressure Injury Interventions: 
Sensory Interventions: Assess changes in LOC, Assess need for specialty bed, Check visual cues for pain, Float heels, Keep linens dry and wrinkle-free, Monitor skin under medical devices, Turn and reposition approx. every two hours (pillows and wedges if needed) Moisture Interventions: Absorbent underpads, Assess need for specialty bed, Moisture barrier Activity Interventions: Pressure redistribution bed/mattress(bed type) Mobility Interventions: Assess need for specialty bed, Float heels, Pressure redistribution bed/mattress (bed type), Turn and reposition approx. every two hours(pillow and wedges) Nutrition Interventions: Document food/fluid/supplement intake Friction and Shear Interventions: Apply protective barrier, creams and emollients, Lift sheet, Transferring/repositioning devices Problem: Patient Education: Go to Patient Education Activity Goal: Patient/Family Education Outcome: Progressing Towards Goal 
  
Problem: Impaired Skin Integrity/Pressure Injury Treatment Goal: *Improvement of Existing Pressure Injury Outcome: Progressing Towards Goal 
Goal: *Prevention of pressure injury Description: Document Valdemar Scale and appropriate interventions in the flowsheet. Outcome: Progressing Towards Goal 
Note: Pressure Injury Interventions: 
Sensory Interventions: Assess changes in LOC, Assess need for specialty bed, Check visual cues for pain, Float heels, Keep linens dry and wrinkle-free, Monitor skin under medical devices, Turn and reposition approx. every two hours (pillows and wedges if needed) Moisture Interventions: Absorbent underpads, Assess need for specialty bed, Moisture barrier Activity Interventions: Pressure redistribution bed/mattress(bed type) Mobility Interventions: Assess need for specialty bed, Float heels, Pressure redistribution bed/mattress (bed type), Turn and reposition approx. every two hours(pillow and wedges) Nutrition Interventions: Document food/fluid/supplement intake Friction and Shear Interventions: Apply protective barrier, creams and emollients, Lift sheet, Transferring/repositioning devices Problem: Patient Education: Go to Patient Education Activity Goal: Patient/Family Education Outcome: Progressing Towards Goal 
  
Problem: Pain Goal: *Control of Pain Outcome: Progressing Towards Goal 
Goal: *PALLIATIVE CARE:  Alleviation of Pain Outcome: Progressing Towards Goal 
  
Problem: Patient Education: Go to Patient Education Activity Goal: Patient/Family Education Outcome: Progressing Towards Goal

## 2020-04-28 NOTE — CONSULTS
Vascular Surgery Consult Note 4/28/2020 Subjective:  
 
Natali Murrieta is a 60 y.o. female w/ a pmhx significant for AFib, PPM, HTN, HLD, CKD IV, anemia, DM, morbid obesity, and breast cancer. She was recently admitted to the hospital w/ a DM left foot infection complicated by osteomyelitis. She underwent debridement by Dr. Kary Pritchett x 2 during that admission and has been on IV antibxs since. Her wound cultures at that time were significant for MSSA, proteus mirabilis, and streptococci beta hemolytic Group B. She is now re admitted to the hospital w/ MSSA septicemia and a vegetation on her PPM lead. She is s/p removal of this hardware on 04/27/2020. We have been asked to evaluate for left BKA vs partial foot amputation. Past Medical History Chronic non-healing diabetic ulcer of the left foot 
-complicated by Charcot's foot Diabetes Mellitus Persistent Morbid obesity  
-s/p Jose-en-y gastric bypass 2008 AFib 
-on warfarin  
-s/p ablation Sick sinus syndrome  
-s/p PPM removal for vegetation 4/2020 Hypertension Hyperlipidemia Chronic renal disease stage IV 
-baseline creatinine 2.4 Anemia of chronic renal disease Asthma Bilateral breast Cancer 
-s/p right radical mastectomy 1998 followed by breast reconstruction complicated by RUE lymphedema w/ residual weakness 
-s/p left mastectomy Osteoarthritis Past Procedural History in addition to above Cervical laminectomy and fusion 1994 D&C 1992 Carpal tunnel release Right MOHs procedure 1995 [facility] [address] [facility] Liver biopsy 2007 Last trabeculopasty Right ovarian cyst surgery 2007 Family History Problem Relation Age of Onset  Cancer Mother  Heart Disease Mother  Alcohol abuse Father  Diabetes Brother  Hypertension Brother Social History Tobacco Use  Smoking status: Never Smoker  Smokeless tobacco: Never Used Substance Use Topics  Alcohol use: Yes Comment: rare She is  and lives w/ her spouse. Prior to Admission medications Medication Sig Start Date End Date Taking? Authorizing Provider  
metOLazone (ZAROXOLYN) 2.5 mg tablet Take 1 tablet po on Mon and Thurs for worsening leg swelling. 4/13/20  Yes Shila Lobo MD  
warfarin (COUMADIN) 4 mg tablet Take 1 tablet on Tues and Sat and a half tablet the other 5 days. 4/10/20  Yes Shila Lobo MD  
bumetanide (BUMEX) 1 mg tablet Take 2 Tabs by mouth daily. 3/26/20  Yes Shila Lobo MD  
insulin glargine (Lantus U-100 Insulin) 100 unit/mL injection Inject 14 units daily 3/26/20  Yes Shila Lobo MD  
metoprolol succinate (TOPROL-XL) 100 mg tablet Take 2 Tabs by mouth daily. 3/26/20  Yes Shila Lobo MD  
dilTIAZem CD (CARDIZEM CD) 180 mg ER capsule Take 1 Cap by mouth daily. 3/22/20  Yes Shanna Morin MD  
hydrALAZINE (APRESOLINE) 50 mg tablet Take 1 Tab by mouth three (3) times daily. 3/22/20  Yes Shanna Morin MD  
acetaminophen 500 mg tab 500 mg, diphenhydrAMINE 25 mg cap 25 mg Take 2 Doses by mouth nightly. Yes Provider, Historical  
sertraline (ZOLOFT) 50 mg tablet TAKE ONE AND ONE-HALF (1 & 1/2) TABLET BY MOUTH DAILY 8/22/19  Yes Shila Lobo MD  
doxazosin (CARDURA) 4 mg tablet TAKE ONE TABLET BY MOUTH ONCE NIGHTLY 6/14/19  Yes Shila Lobo MD  
busPIRone (BUSPAR) 10 mg tablet TAKE ONE TABLET BY MOUTH TWICE A DAY 5/24/19  Yes Shila Lobo MD  
cholecalciferol, VITAMIN D3, (VITAMIN D3) 5,000 unit tab tablet Take 5,000 Units by mouth daily. Yes Provider, Historical  
vitamin A 8,000 unit capsule Take 8,000 Units by mouth daily. Yes Provider, Historical  
insulin lispro (HUMALOG) 100 unit/mL kwikpen 2 units with dinner for sugars over 200 1/3/14  Yes Shila Lobo MD  
cyanocobalamin (VITAMIN B-12) 1,000 mcg sublingual tablet Take 1,000 mcg by mouth daily. Yes Provider, Historical  
 
Allergies Allergen Reactions  Ace Inhibitors Cough  Amlodipine Swelling  Avapro [Irbesartan] Unable to Obtain  Bactrim [Sulfamethoxazole-Trimethoprim] Other (comments) Sore mouth  Captopril Cough  Carvedilol Diarrhea  Contrast Agent [Iodine] Itching [address]  Morphine Nausea and Vomiting and Swelling  Penicillins Hives Did not tolerate cefepime 3/2020  Pravachol [Pravastatin] Nausea Only  Seafood [Shellfish Containing Products] Hives  Singulair [Montelukast] Other (comments)  
  palpitations Review of Systems Constitutional: Negative for activity change, chills, fatigue and fever. HENT: Negative for congestion. Eyes: Negative for visual disturbance. Respiratory: Negative for cough, chest tightness and shortness of breath. Cardiovascular: Positive for leg swelling. Negative for chest pain. Gastrointestinal: Negative for nausea and vomiting. Endocrine: Negative for polydipsia and polyuria. Musculoskeletal: Positive for arthralgias, gait problem and joint swelling. Negative for myalgias. Skin: Positive for color change, pallor and wound. Allergic/Immunologic: Negative for immunocompromised state. Neurological: Positive for weakness. Hematological: Negative. Psychiatric/Behavioral: Negative. Objective:  
 
 
Patient Vitals for the past 24 hrs: 
 BP Temp Pulse Resp SpO2  
04/28/20 1116 123/50 98 °F (36.7 °C) (!) 59 16   
04/28/20 1048 120/41  64 13 98 % 04/28/20 1000 120/41  72 18 99 % 04/28/20 0900 130/54  65 14 97 % 04/28/20 0800 148/59  66 15 98 % 04/28/20 0700 139/59  67 20 98 % 04/28/20 0600 142/57  66 14 96 % 04/28/20 0500 131/55  68 14 96 % 04/28/20 0400 144/56 97.9 °F (36.6 °C) 73 13 95 % 04/28/20 0300 124/52  71 12 96 % 04/28/20 0200 119/47  67 13 95 % 04/28/20 0100 121/46  68 13 94 % 04/28/20 0000 129/45 98.8 °F (37.1 °C) 73 20 96 % 04/27/20 2300 136/53  74 17 97 % 04/27/20 2200 132/51  65 17 95 % 04/27/20 2100 126/49  (!) 58 15 96 % 04/27/20 2000 112/40 97.8 °F (36.6 °C) (!) 51 14 98 % 04/27/20 1900 (!) 119/36  68 14 99 % 04/27/20 1800 134/49  66 15 97 % 04/27/20 1700 134/46  65 8 98 % 04/27/20 1600 137/51 98.1 °F (36.7 °C) 65 19 98 % 04/27/20 1500 132/47  64 18 98 % 04/27/20 1400 134/47  60 17 97 % 04/27/20 1345 122/47 97.4 °F (36.3 °C) 61 17 97 % 04/27/20 1338 135/45 97.7 °F (36.5 °C) 60 17 95 % Physical Exam 
Constitutional:   
   Appearance: She is obese. HENT:  
   Head: Normocephalic. Nose: Nose normal.  
   Mouth/Throat:  
   Mouth: Mucous membranes are moist.  
Eyes:  
   Pupils: Pupils are equal, round, and reactive to light. Neck: Musculoskeletal: Normal range of motion. Cardiovascular:  
   Rate and Rhythm: Normal rate. Rhythm irregular. Pulses: Normal pulses. Pulmonary:  
   Effort: Pulmonary effort is normal.  
   Breath sounds: Normal breath sounds. Abdominal:  
   General: Abdomen is flat. There is no distension. Palpations: Abdomen is soft. Musculoskeletal: Normal range of motion. Skin: 
   General: Skin is warm. Comments: Open ulceration to the left lateral foot. Neurological:  
   General: No focal deficit present. Mental Status: She is alert. Mental status is at baseline. Psychiatric:     
   Mood and Affect: Mood normal.     
   Behavior: Behavior normal.  
 
 
 
Pertinent Test Results:  
Recent Results (from the past 24 hour(s)) GLUCOSE, POC Collection Time: 04/27/20  4:12 PM  
Result Value Ref Range Glucose (POC) 98 65 - 100 mg/dL Performed by [employer] CBC WITH AUTOMATED DIFF Collection Time: 04/27/20  4:30 PM  
Result Value Ref Range WBC 8.8 3.6 - 11.0 K/uL  
 RBC 2.95 (L) 3.80 - 5.20 M/uL HGB 8.6 (L) 11.5 - 16.0 g/dL HCT 27.2 (L) 35.0 - 47.0 % MCV 92.2 80.0 - 99.0 FL  
 MCH 29.2 26.0 - 34.0 PG  
 MCHC 31.6 30.0 - 36.5 g/dL  
 RDW 15.6 (H) 11.5 - 14.5 %  PLATELET 083 256 - 400 K/uL MPV 10.8 8.9 - 12.9 FL  
 NRBC 0.0 0  WBC ABSOLUTE NRBC 0.00 0.00 - 0.01 K/uL NEUTROPHILS 75 32 - 75 % LYMPHOCYTES 11 (L) 12 - 49 % MONOCYTES 10 5 - 13 % EOSINOPHILS 2 0 - 7 % BASOPHILS 1 0 - 1 % IMMATURE GRANULOCYTES 1 (H) 0.0 - 0.5 % ABS. NEUTROPHILS 6.7 1.8 - 8.0 K/UL  
 ABS. LYMPHOCYTES 1.0 0.8 - 3.5 K/UL  
 ABS. MONOCYTES 0.9 0.0 - 1.0 K/UL  
 ABS. EOSINOPHILS 0.2 0.0 - 0.4 K/UL  
 ABS. BASOPHILS 0.1 0.0 - 0.1 K/UL  
 ABS. IMM. GRANS. 0.1 (H) 0.00 - 0.04 K/UL  
 DF AUTOMATED    
GLUCOSE, POC Collection Time: 04/27/20 10:44 PM  
Result Value Ref Range Glucose (POC) 116 (H) 65 - 100 mg/dL Performed by Praveena Chaves (traveler) PROTHROMBIN TIME + INR Collection Time: 04/28/20  4:57 AM  
Result Value Ref Range INR 1.2 (H) 0.9 - 1.1 Prothrombin time 12.2 (H) 9.0 - 11.1 sec METABOLIC PANEL, BASIC Collection Time: 04/28/20  4:57 AM  
Result Value Ref Range Sodium 141 136 - 145 mmol/L Potassium 3.5 3.5 - 5.1 mmol/L Chloride 109 (H) 97 - 108 mmol/L  
 CO2 26 21 - 32 mmol/L Anion gap 6 5 - 15 mmol/L Glucose 95 65 - 100 mg/dL BUN 18 6 - 20 MG/DL Creatinine 1.35 (H) 0.55 - 1.02 MG/DL  
 BUN/Creatinine ratio 13 12 - 20 GFR est AA 48 (L) >60 ml/min/1.73m2 GFR est non-AA 40 (L) >60 ml/min/1.73m2 Calcium 8.6 8.5 - 10.1 MG/DL MAGNESIUM Collection Time: 04/28/20  4:57 AM  
Result Value Ref Range Magnesium 2.2 1.6 - 2.4 mg/dL CBC WITH AUTOMATED DIFF Collection Time: 04/28/20  4:57 AM  
Result Value Ref Range WBC 8.8 3.6 - 11.0 K/uL  
 RBC 2.91 (L) 3.80 - 5.20 M/uL HGB 8.3 (L) 11.5 - 16.0 g/dL HCT 26.8 (L) 35.0 - 47.0 % MCV 92.1 80.0 - 99.0 FL  
 MCH 28.5 26.0 - 34.0 PG  
 MCHC 31.0 30.0 - 36.5 g/dL  
 RDW 15.1 (H) 11.5 - 14.5 % PLATELET 312 (H) 979 - 400 K/uL MPV 9.5 8.9 - 12.9 FL  
 NRBC 0.0 0  WBC ABSOLUTE NRBC 0.00 0.00 - 0.01 K/uL NEUTROPHILS 75 32 - 75 %  LYMPHOCYTES 11 (L) 12 - 49 %  
 MONOCYTES 10 5 - 13 % EOSINOPHILS 2 0 - 7 % BASOPHILS 1 0 - 1 % IMMATURE GRANULOCYTES 1 (H) 0.0 - 0.5 % ABS. NEUTROPHILS 6.6 1.8 - 8.0 K/UL  
 ABS. LYMPHOCYTES 1.0 0.8 - 3.5 K/UL  
 ABS. MONOCYTES 0.9 0.0 - 1.0 K/UL  
 ABS. EOSINOPHILS 0.1 0.0 - 0.4 K/UL  
 ABS. BASOPHILS 0.1 0.0 - 0.1 K/UL  
 ABS. IMM. GRANS. 0.1 (H) 0.00 - 0.04 K/UL  
 DF AUTOMATED    
GLUCOSE, POC Collection Time: 04/28/20  7:59 AM  
Result Value Ref Range Glucose (POC) 88 65 - 100 mg/dL Performed by Ranjit Boyle RN   
GLUCOSE, POC Collection Time: 04/28/20 10:54 AM  
Result Value Ref Range Glucose (POC) 119 (H) 65 - 100 mg/dL Performed by Viktor [facility]) Liliana Milam Assessmen/Plan:  
 
Consult problems: 
Chronic non-healing diabetic ulcer of the left foot 
-complicated by Charcot's foot Persistent MSSA septicemia Sick sinus syndrome 
-s/p PPM removal for vegetation 4/2020 
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 · PVR and PPG waveforms appear within normal limits bilaterally. · Bilateral TBIs are normal. 
 
All limb salvage options have been exhausted. Compromised skin of heel precludes Syme. Left BKA only option. This has been discussed w/ patient. She is reluctant to proceed. Antibx per infectious disease. Agree w/ betadine soaked dressing changes. Active problems Diabetes Mellitus 
-currently controlled Persistent Morbid Obesity  
-s/p gastric bypass AFib 
-rate currently controlled Warfarin induced coagulopathy  
-resolved. Now subtherapeutic. Hypertension 
-stable Hyperlipidemia Chronic renal disease stage IV 
-baseline creatinine 2.4 
-improved over baseline Hypokalemia 
-resolved Anemia of chronic renal disease 
-stable Asthma 
-currently not in exacerbation Remote hx of bilateral breast cancer Osteoarthritis Management of comorbid conditions by primary team. 
 
VTE Prophylaxis: 
Warfarin-currently subtherapeutic Disposition: TBD by primary team.   
 
Signed By: Juanita Osorio NP April 28, 2020

## 2020-04-28 NOTE — PROGRESS NOTES
Hospitalist Progress Note NAME: Lorenzo Oppenheim :  1960 MRN:  [id number] I reviewed with Dr. Binh Serrano about the medical history and the findings on the physical examination. I discussed with Dr. Binh Serrano the patient's diagnosis and concur with the plan. Interim Hospital Summary: 60 y.o. female whom presented on 2020 with fever due to infected left foot and possible OM of left metatarsals. Pt was discharged after being treated for left leg cellulitis on 3/20/2020. Assessment / Plan: 
Sepsis secondary to left foot cellulitis and OM left metatarsals 3,4,and 5 
- appreciate podiatrist's input; concerned with prolonged IV ABX tmt. Dr. Marina Smith will discuss with Vascular service on either a BKA vs Chopart's amputation of left side CT of lower extremity: Large soft tissue ulcer at the base of the fifth metatarsal. No definite evidence of osteomyelitis Bacteremia with MSSA 
- continue with ID team's input; duration of the therapy, 6 weeks from  (device removal date) BC ()  MSSA -  - LAC 
  BC ()  MSSA -  - RAC 
  BC ()  NG 
  WC () Light  MSSA,few Strep Gp G beta hemolytic ECHO - possible vegetation on AV Pacemaker present - no swelling , erythema MIKE - mobile vegetation on pacer lead Continue with IV vanc Vegetation on pacemaker lead S/p dual lead extraction and device removal 2020 by Dr. Brandon Harrell 
Anemia of chronic disease - INR 1.2 & hgb 8.6 prior to the procedure Transfuse PRN if hgb <7.0 Atrial fibrillation Hypercoagulable state secondary to afib HTN 
SSS 
- Continue Cardizem, doxazosin, hydralazine & metoprolol Warfarin on hold 
  
PARAG on CKD stage IV 
- creat trending down 1.34 from 1.9 on admission Avoid nephrotoxic agents 
  
Type 2 DM with hyperglycemia 
- Hgb A1c 7.5   Check qac/qhs blood glucose and follow SSI 
  
Depression and anxiety 
- Continue sertraline and buspirone  
  
30.0 - 39.9 Obese / Body mass index is 34.87 kg/m². Code status: Full Prophylaxis: SCD's Recommended Disposition:  PT, OT, RN Subjective: Chief Complaint / Reason for Physician Visit \"I feel ok. I am tired. \"  Discussed with RN events overnight. Pt was seen prior to the procedure. Review of Systems: 
Symptom Y/N Comments  Symptom Y/N Comments Fever/Chills n   Chest Pain n   
Poor Appetite    Edema Cough    Abdominal Pain Sputum    Joint Pain SOB/CHOW n   Pruritis/Rash Nausea/vomit n   Tolerating PT/OT Diarrhea n   Tolerating Diet Constipation n   Other Could NOT obtain due to:   
 
Objective: VITALS:  
Last 24hrs VS reviewed since prior progress note. Most recent are: 
Patient Vitals for the past 24 hrs: 
 Temp Pulse Resp BP SpO2  
04/28/20 0200  67 13 119/47 95 % 04/28/20 0100  68 13 121/46 94 % 04/28/20 0000 98.8 °F (37.1 °C) 73 20 129/45 96 % 04/27/20 2300  74 17 136/53 97 % 04/27/20 2200  65 17 132/51 95 % 04/27/20 2100  (!) 58 15 126/49 96 % 04/27/20 2000 97.8 °F (36.6 °C) (!) 51 14 112/40 98 % 04/27/20 1900  68 14 (!) 119/36 99 % 04/27/20 1800  66 15 134/49 97 % 04/27/20 1700  65 8 134/46 98 % 04/27/20 1600 98.1 °F (36.7 °C) 65 19 137/51 98 % 04/27/20 1500  64 18 132/47 98 % 04/27/20 1400  60 17 134/47 97 % 04/27/20 1345 97.4 °F (36.3 °C) 61 17 122/47 97 % 04/27/20 1338 97.7 °F (36.5 °C) 60 17 135/45 95 % 04/27/20 1105 98.3 °F (36.8 °C) 70 18 141/52 98 % 04/27/20 1100  65  141/52   
04/27/20 1000  68     
04/27/20 0803 98.3 °F (36.8 °C) 72 18 156/48 99 % Intake/Output Summary (Last 24 hours) at 4/28/2020 0535 Last data filed at 4/28/2020 0000 Gross per 24 hour Intake 1140 ml Output 2575 ml Net -1435 ml PHYSICAL EXAM: 
General: Ill appearing. Alert, cooperative, no acute distress EENT:  EOMI. Anicteric sclerae. MMM Resp:  Clear in apex with decreased breath sounds at bases. no wheezing or rales.   No accessory muscle use CV:  Regular  rhythm,  No edema GI:  Soft, Non distended, Non tender. +Bowel sounds Neurologic:  Alert and oriented X 3, normal speech, Psych:   Fair insight. Not anxious nor agitated Skin:  No rashes. No jaundice, left foot dressing dry and intact Reviewed most current lab test results and cultures  YES Reviewed most current radiology test results   YES Review and summation of old records today    NO Reviewed patient's current orders and MAR    YES 
PMH/SH reviewed - no change compared to H&P 
________________________________________________________________________ Care Plan discussed with: 
  Comments Patient y Family RN y   
Care Manager Consultant Multidiciplinary team rounds were held today with , nursing, pharmacist and clinical coordinator. Patient's plan of care was discussed; medications were reviewed and discharge planning was addressed. ________________________________________________________________________ Osei Sosa NP Procedures: see electronic medical records for all procedures/Xrays and details which were not copied into this note but were reviewed prior to creation of Plan. LABS: 
I reviewed today's most current labs and imaging studies. Pertinent labs include: 
Recent Labs  
  04/28/20 0457 04/27/20 
1630 04/27/20 
0304 WBC 8.8 8.8 10.6 HGB 8.3* 8.6* 7.9*  
HCT 26.8* 27.2* 25.3*  
* 293 482* Recent Labs  
  04/28/20 
0457 04/27/20 
0304 04/26/20 
3810 NA  --  138 140 K  --  3.5 3.7 CL  --  108 111* CO2  --  23 22 GLU  --  105* 109* BUN  --  16 15 CREA  --  1.34* 1.46* CA  --  8.9 8.3* INR 1.2* 1.2* 1.5* Signed: )Truong Witt, NP

## 2020-04-29 NOTE — PROGRESS NOTES
Bedside shift change report GIVEN TO Lev Dunlap RN. Report included the following information SBAR. SIGNIFICANT CHANGES DURING SHIFT:  Pt medicated twice for pain at left upper chest pacemaker removal site. CONCERNS TO ADDRESS WITH MD:   
 
 
 
 
[facility] NURSING NOTE Admission Date 4/19/2020 Admission Diagnosis Sepsis ([address] [A41.9] Consults IP CONSULT TO HOSPITALIST 
IP CONSULT TO PODIATRY IP CONSULT TO INFECTIOUS DISEASES 
IP CONSULT TO CARDIOLOGY 
IP CONSULT TO VASCULAR SURGERY Cardiac Monitoring [x] Yes [] No  
  
Purposeful Hourly Rounding [x] Yes   
Rayne Score Total Score: 3 Rayne score 3 or > [x] Bed Alarm [] Avasys [] 1:1 sitter [] Patient refused (Signed refusal form in chart) Valdemar Score Valdemar Score: 17 Valdemar score 14 or < [] PMT consult [] Wound Care consult  
 []  Specialty bed  [] Nutrition consult Influenza Vaccine Received Flu Vaccine for Current Season (usually Sept-March): Yes Oxygen needs? [x] Room air Oxygen @  []1L    []2L    []3L   []4L    []5L   []6L via NC Chronic home O2 use? [] Yes [x] No 
Perform O2 challenge test and document in progress note using smartphrase (.Homeoxygen) Last bowel movement Last Bowel Movement Date: 04/26/20 Urinary Catheter External Female Catheter 04/20/20-Urine Output (mL): 800 ml LDAs Peripheral IV Left External jugular (Active) Site Assessment Clean, dry, & intact 4/29/2020  4:00 AM  
Phlebitis Assessment 0 4/29/2020  4:00 AM  
Infiltration Assessment 0 4/29/2020  4:00 AM  
Dressing Status Clean, dry, & intact 4/29/2020  4:00 AM  
Dressing Type Tape;Transparent 4/29/2020  4:00 AM  
Hub Color/Line Status Flushed 4/29/2020  4:00 AM  
Action Taken Open ports on tubing capped 4/27/2020  8:00 PM  
Alcohol Cap Used No 4/28/2020  4:00 AM  
      
External Female Catheter 04/20/20 (Active) Site Assessment Clean, dry, & intact 4/28/2020  9:22 PM  
Repositioned Yes 4/28/2020  9:22 PM Perineal Care Yes 4/28/2020  9:22 PM  
Wick Changed Yes 4/28/2020  9:22 PM  
Suction Canister/Tubing Changed Yes 4/28/2020  9:22 PM  
Urine Output (mL) 800 ml 4/28/2020  9:22 PM  
            
  
Readmission Risk Assessment Tool Score High Risk 36 Total Score 3 Has Seen PCP in Last 6 Months (Yes=3, No=0) 2 . Living with Significant Other. Assisted Living. LTAC. SNF. or  
Rehab  
 3 Patient Length of Stay (>5 days = 3) 9 IP Visits Last 12 Months (1-3=4, 4=9, >4=11) 23 Charlson Comorbidity Score (Age + Comorbid Conditions) Criteria that do not apply:  
 Pt. Coverage (Medicare=5 , Medicaid, or Self-Pay=4) Expected Length of Stay 5d 16h Actual Length of Stay 9

## 2020-04-29 NOTE — PROGRESS NOTES
Pharmacy Automatic Renal Dosing Protocol - Antimicrobials Indication for Antimicrobials: Left foot cellulitis and osteomyelitis Current Regimen of Each Antimicrobial: 
Vancomycin pharmacy to dose  (Start Date ; Day # 10) Previous Antimicrobial Therapy: 
Cefepime 1 g IV every 12 hours (Start Date ; Day # 2) Levofloxacin 750 mg IV every 48 hours (Start Date ; Day # 6) Metronidazole 500 mg IV every 12 hours (Start Date ; Day # 6) Goal Level: VANCOMYCIN TROUGH GOAL RANGE Vancomycin Trough: 15 - 20 mcg/mL  (AUC: 400 - 600 mg/hr/Liter/day) Date Dose & Interval Measured (mcg/mL) Extrapolated (mcg/mL)  
 @ 22:38 1250 mg Q24H 20.6 20.5  @ 0053 1000 mg q24h 20.9 20.4  
 @ 1316 1250 mg q 24hr 20.8 26  
 @ 16:10 1250 mg q36hr 25.9 28.9 Date & time of next level: maybe  Significant Cultures:  
 Blood: GPC in clusters in - final (S to oxacilin), MSSA  
 Wound: light staph aureus (MSSA), beta hemolytic strep  wound (anaerobic) cx: light diptheroids : blood - NGTD - final 
 
Radiology / Imaging results: (X-ray, CT scan or MRI):  
 Lt Foot: Soft tissue wound at the level of the third through fifth proximal metatarsals. Underlying cortical irregularity and lucency within the proximal third through fifth metatarsals may represent osteomyelitis Paralysis, amputations, malnutrition: None Labs: 
Recent Labs  
  20 
0448 20 
0457 20 
1630 20 
0304 CREA 1.45* 1.35*  --  1.34* BUN 18 18  --  16 WBC  --  8.8 8.8 10.6 Temp (24hrs), Av.1 °F (36.7 °C), Min:97.7 °F (36.5 °C), Max:98.7 °F (37.1 °C) Creatinine Clearance (mL/min) or Dialysis: 44.6 mL/min (IBW) Impression/Plan:  
+++ID following+++ MSSA in cultures, but patient is PCN allergic Vancomycin level on 1250 mg q36H 25.9 mcg/ml (Steady state 28.9 mcg/ml).  Dose held tonight and will restart tomorrow at 12:00 with 500 mg Q24H anticipated trough 19 mcg/ml (). Recheck level on 5/1. Slight bump in SCr today 
pacemaker lead extraction 4/27 Possible plan for BKA or choparts amputation Antimicrobial stop date: 6 weeks - to end 6/8/20 per Dr. Meri Fabry note Pharmacy will follow daily and adjust medications as appropriate for renal function and/or serum levels. Thank you, Mitesh Aragon, PHARMD

## 2020-04-29 NOTE — PROGRESS NOTES
RAPID RESPONSE TEAM- Follow Up Rounded on patient due to recent transfer from CCU. Spoke with RN. No concerns at this time; periods of stable bradycardia after PPM removal. Other VSS. No RRT interventions indicated at this time. Please call back if needed. Rima Sebastian Vitals w/ MEWS Score (last day) Date/Time MEWS Score Pulse Resp Temp BP Level of Consciousness SpO2  
 04/28/20 1913  1  73  18  97.9 °F (36.6 °C)  107/41  Alert  97 % 04/28/20 1513  1  63  16  98.7 °F (37.1 °C)  145/57  Alert    
 04/28/20 1116  1  (!) 59  16  98 °F (36.7 °C)  123/50  Alert    
 04/28/20 1048    64  13    120/41    98 % 04/28/20 1000    72  18    120/41  Alert  99 % 04/28/20 0900    65  14    130/54    97 % 04/28/20 0800    66  15    148/59    98 % 04/28/20 0700    67  20    139/59    98 % 04/28/20 0600    66  14    142/57    96 % 04/28/20 0500    68  14    131/55    96 % 04/28/20 0400  0  73  13  97.9 °F (36.6 °C)  144/56  Alert  95 % 04/28/20 0300    71  12    124/52    96 % 04/28/20 0200    67  13    119/47    95 % 04/28/20 0100    68  13    121/46    94 % 04/28/20 0000  1  73  20  98.8 °F (37.1 °C)  129/45  Alert  96 % 04/27/20 2300    74  17    136/53    97 % 04/27/20 2200    65  17    132/51    95 % 04/27/20 2100    (!) 58  15    126/49    96 % 04/27/20 2000  0  (!) 51  14  97.8 °F (36.6 °C)  112/40  Alert  98 % 04/27/20 1900    68  14    (!) 119/36    99 % 04/27/20 1800    66  15    134/49    97 % 04/27/20 1700    65  8    134/46    98 % 04/27/20 1600  1  65  19  98.1 °F (36.7 °C)  137/51  Alert  98 % 04/27/20 1500    64  18    132/47    98 % 04/27/20 1400    60  17    134/47    97 % 04/27/20 1345  1  61  17  97.4 °F (36.3 °C)  122/47  Alert  97 % 04/27/20 1338    60  17  97.7 °F (36.5 °C)  135/45    95 %  04/27/20 1105  1  70  18  98.3 °F (36.8 °C)  141/52 Alert  98 % 04/27/20 1100    65      141/52      
 04/27/20 1000    68            
 04/27/20 0803  1  72  18  98.3 °F (36.8 °C)  156/48  Alert  99 % 04/27/20 0306  1  65  18  98 °F (36.7 °C)  140/53  Alert  98 %

## 2020-04-29 NOTE — PROGRESS NOTES
Requested by Hospitalist NP to determine whether appropriate/safe to restart anticoagulation. Patient had been on coumadin with last dose 4/23/2020, held for EP procedure of lead extraction which was completed on 4/20/2020. INR today is 1.1 Dr. Poli Lim OK to restart anticoagulation. 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 Vascular NP states Vascular OK with starting anticoagulation Start Heparin IV today. Resume coumadin for management of PAF post vascular surgery.

## 2020-04-29 NOTE — PROGRESS NOTES
Nutrition Assessment: 
 
INTERVENTIONS/RECOMMENDATIONS:  
· Continue current diet · Glucerna BID · Please document % meals and supplements consumed in flowsheet I/O's under intake ASSESSMENT:  
Chart reviewed. Pt reports poor PO intake r/t dislike of food. She did not like gelatein and reports dislike of ensure pudding. She does like glucerna shakes. Originally hesitant about adding glucerna shakes due to obesity and diuresing however due to pt report of poor intake, will add. Diet Order: Cardiac 
% Eaten:   
Patient Vitals for the past 72 hrs: 
 % Diet Eaten 04/27/20 2000 75 % Pertinent Medications: [x]Reviewed: bumex, lantus, humalog, Pertinent Labs: [x]Reviewed:  
Food Allergies: []NKFA  [x]Other (fish) Last BM:  4/28 Edema:      []RUE   []LUE   [x]RLE 3+  [x]LLE 2+ Pressure Ulcer: Bilateral Diabetic foot ulcer     [] Stage I   [] Stage II   [] Stage III   [] Stage IV Anthropometrics: Height: 5' 10\" (177.8 cm) Weight: 104.8 kg (231 lb) IBW (%IBW):   ( ) UBW (%UBW):   (  %) BMI: Body mass index is 33.15 kg/m². This BMI is indicative of: 
[]Underweight   []Normal   []Overweight   [x] Obesity   [] Extreme Obesity (BMI>40) Last Weight Metrics: 
Weight Loss Metrics 4/29/2020 3/26/2020 3/19/2020 1/24/2020 1/14/2020 12/12/2019 12/6/2019 Today's Wt 231 lb 251 lb 238 lb 8.6 oz 246 lb 250 lb 3.6 oz 263 lb 251 lb 1.7 oz  
BMI 33.15 kg/m2 37.07 kg/m2 35.23 kg/m2 35.3 kg/m2 35.9 kg/m2 38.84 kg/m2 37.08 kg/m2 Estimated Nutrition Needs (Based on): 8233 Kcals/day(20 kcal/kg Adj BW) , 110 g(1 g/kg) Protein Carbohydrate: At Least 130 g/day  Fluids: 1575 mL/day or per primary team 
 
NUTRITION DIAGNOSES:  
Problem:  Increased nutrient needs Etiology: related to wound healing Signs/Symptoms: as evidenced by bilateral full thickness foot wounds Previous Nutrition Dx:  [] Resolved  [] Unresolved           [x] Progressing NUTRITION INTERVENTIONS: 
Meals/Snacks: General/healthful diet   Supplements: Commercial supplement GOAL:  
consume >50% of meals and ONS in 3-5 days NUTRITION MONITORING AND EVALUATION Food/Nutrient Intake Outcomes: Total energy intake Physical Signs/Symptoms Outcomes: Weight/weight change, Electrolyte and renal profile, Glucose profile, GI 
 
Previous Goal Met: 
 [] Met              [x] Progressing Towards Goal              [] Not Progressing Towards Goal  
Previous Recommendations: 
 [x] Implemented          [] Not Implemented          [] Not Applicable LEARNING NEEDS (Diet, Food/Nutrient-Drug Interaction):  
 [x] None Identified 
 [] Identified and Education Provided/Documented 
 [] Identified and Pt declined/was not appropriate Cultural, Latter day, OR Ethnic Dietary Needs:  
 [x] None Identified 
 [] Identified and Addressed 
 
 [x] Interdisciplinary Care Plan Reviewed/Documented  
 [x] Discharge Planning: consistent carb, low Na diet 
 [] Participated in Interdisciplinary Rounds NUTRITION RISK:  
 [] High              [x] Moderate           []  Low  []  Minimal/Uncompromised Venita Mckeon RDN Pager [phone] Weekend Pager [phone]

## 2020-04-29 NOTE — PROGRESS NOTES
Podiatry Subjective: Pt in chair, nurse performing wound care on feet. No new issues. Patient is a 60 y.o.  female who is being seen for osteomyelitis left foot. Patient Active Problem List  
 Diagnosis Date Noted  Sepsis ([address] 04/20/2020  H/O bilateral mastectomy 03/26/2020  Left leg cellulitis 03/08/2020  Atrial fibrillation with RVR ([address] 03/08/2020  Diabetic ulcer of left midfoot with necrosis of muscle ([address] 03/03/2020  Traumatic hematoma of foot, left, initial encounter 02/25/2020  Type 2 diabetes mellitus with left diabetic foot infection ([address] 10/29/2019  Foot deformity, acquired, left 09/24/2019  Foot deformity, right 09/24/2019  Pes cavus 09/24/2019  Diabetic ulcer of right midfoot associated with type 2 diabetes mellitus, with fat layer exposed ([address] 08/06/2019  Diabetic ulcer of left heel associated with type 2 diabetes mellitus, with fat layer exposed ([address] 06/21/2019  Venous stasis ulcer of right lower leg with edema of right lower leg (HCC) 11/14/2018  Diabetic polyneuropathy associated with type 2 diabetes mellitus ([address] 11/13/2018  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, associated with type 2 diabetes mellitus ([address] 04/25/2018  Morbid obesity with BMI of 40.0-44.9, adult ([address] 05/01/2017  Controlled type 2 diabetes mellitus with stage 4 chronic kidney disease, with long-term current use of insulin ([address] 01/16/2017  PAF (paroxysmal atrial fibrillation) ([address] 11/28/2016  Anemia in stage 4 chronic kidney disease ([address] 11/28/2016  Essential hypertension 08/26/2016  Systolic murmur 49/49/4732  CKD (chronic kidney disease), stage IV ([address] 06/09/2012  Mixed hyperlipidemia 05/22/2012  Long term (current) use of anticoagulants 04/11/2012  S/P cardiac pacemaker procedure 04/03/2012  Sick sinus syndrome ([address] 04/02/2012  Status post ablation of atrial fibrillation 12/15/2011  Fe deficiency anemia 04/14/2010  
 History of breast cancer 04/13/2010  Asthma 04/13/2010 Past Medical History:  
Diagnosis Date  Acquired lymphedema Right arm  Arrhythmia  Asthma  Atrial fibrillation ([facility] Dr. Stephan Councilman and Dr. Bull alves [facility])  Cancer ([facility] bilateral breast  
 Chronic kidney disease  Diabetes mellitus type II   
 Dizziness  Essential hypertension  Hyperlipidemia  Hypertension  Long term (current) use of anticoagulants  Morbid obesity ([facility] 3/14/2012  Nausea & vomiting  Neuropathy  Osteoarthritis  Pacemaker  Sick sinus syndrome ([facility] Past Surgical History:  
Procedure Laterality Date  ABDOMEN SURGERY PROC UNLISTED    
 gastric bypass  GASTRIC BYPASS,OBESE<150CM SAW-EN-Y  7/08  
 [place]  HX AFIB ABLATION    
 HX BREAST RECONSTRUCTION Bilateral   
 HX CARPAL TUNNEL RELEASE [id number] RIGHT MODIFIED RADICAL   
 HX MASTECTOMY Left   
 no lymphnode removal  
 HX MOHS PROCEDURES  1995  
 right  HX ORTHOPAEDIC Right   
 knee surgery  HX PACEMAKER    
 HX PACEMAKER    
 IR INSERT TUNL CVC W PORT OVER 5 YEARS    
 LAMINECTOMY,CERVICAL  1994  
 NEEDLE BIOPSY LIVER,W OTHR [address]  8.21.07  
 MT [address] AND 1994  MT RELOCATION OF SKIN POCKET FOR PACEMAKER N/A 4/24/2020 RELOCATE 2 [facility] performed by Ravinder Larson MD at [address] CATH LAB  MT RMVL TRANSVNS PM ELTRD 1 LEAD SYS ATR/VENTR N/A 4/24/2020 Remove Pacemaker Dual Leads performed by Ravinder Larson MD at [facility] CARDIAC CATH LAB  MT RMVL TRANSVNS PM ELTRD 1 LEAD SYS ATR/VENTR N/A 4/27/2020 REMOVE PACEMAKER DUAL LEADS performed by Ravinder Larson MD at [address] CATH LAB  MT TRABECULOPLASTY BY LASER SURGERY    
 US GUIDE ASP OVARIAN CYST ABD APPROACH  8.21.07  
 RIGHT Family History Problem Relation Age of Onset  Cancer Mother  Heart Disease Mother  Alcohol abuse Father  Diabetes Brother  Hypertension Brother Social History Tobacco Use  Smoking status: Never Smoker  Smokeless tobacco: Never Used Substance Use Topics  Alcohol use: Yes Comment: rare Allergies Allergen Reactions  Ace Inhibitors Cough  Amlodipine Swelling  Avapro [Irbesartan] Unable to Obtain  Bactrim [Sulfamethoxazole-Trimethoprim] Other (comments) Sore mouth  Captopril Cough  Carvedilol Diarrhea  Contrast Agent [Iodine] Itching [address]  Morphine Nausea and Vomiting and Swelling  Penicillins Hives Did not tolerate cefepime 3/2020  Pravachol [Pravastatin] Nausea Only  Seafood [Shellfish Containing Products] Hives  Singulair [Montelukast] Other (comments)  
  palpitations Prior to Admission medications Medication Sig Start Date End Date Taking? Authorizing Provider  
metOLazone (ZAROXOLYN) 2.5 mg tablet Take 1 tablet po on Mon and Thurs for worsening leg swelling. 4/13/20  Yes Navid Rivera MD  
warfarin (COUMADIN) 4 mg tablet Take 1 tablet on Tues and Sat and a half tablet the other 5 days. 4/10/20  Yes Navid Rivera MD  
bumetanide (BUMEX) 1 mg tablet Take 2 Tabs by mouth daily. 3/26/20  Yes Navid Rivera MD  
insulin glargine (Lantus U-100 Insulin) 100 unit/mL injection Inject 14 units daily 3/26/20  Yes Navid Rivera MD  
metoprolol succinate (TOPROL-XL) 100 mg tablet Take 2 Tabs by mouth daily. 3/26/20  Yes Navid Rivera MD  
dilTIAZem CD (CARDIZEM CD) 180 mg ER capsule Take 1 Cap by mouth daily. 3/22/20  Yes Byron Gray MD  
hydrALAZINE (APRESOLINE) 50 mg tablet Take 1 Tab by mouth three (3) times daily.  3/22/20  Yes Byron Gray MD  
acetaminophen 500 mg tab 500 mg, diphenhydrAMINE 25 mg cap 25 mg Take 2 Doses by mouth nightly. Yes Provider, Historical  
sertraline (ZOLOFT) 50 mg tablet TAKE ONE AND ONE-HALF (1 & 1/2) TABLET BY MOUTH DAILY 19  Yes Ayla Rey MD  
doxazosin (CARDURA) 4 mg tablet TAKE ONE TABLET BY MOUTH ONCE NIGHTLY 19  Yes Ayla Rey MD  
busPIRone (BUSPAR) 10 mg tablet TAKE ONE TABLET BY MOUTH TWICE A DAY 19  Yes Ayla Rey MD  
cholecalciferol, VITAMIN D3, (VITAMIN D3) 5,000 unit tab tablet Take 5,000 Units by mouth daily. Yes Provider, Historical  
vitamin A 8,000 unit capsule Take 8,000 Units by mouth daily. Yes Provider, Historical  
insulin lispro (HUMALOG) 100 unit/mL kwikpen 2 units with dinner for sugars over 200 1/3/14  Yes Ayla Rey MD  
cyanocobalamin (VITAMIN B-12) 1,000 mcg sublingual tablet Take 1,000 mcg by mouth daily. Yes Provider, Historical  
 
 
Review of Systems AO x3. NAD. Objective:  
 
Patient Vitals for the past 8 hrs: 
 BP Temp Pulse Resp SpO2  
20 1547 138/53 98.2 °F (36.8 °C) 60 18 99 % 20 1122 139/55 98.1 °F (36.7 °C) 67 18 98 % Temp (24hrs), Av.1 °F (36.7 °C), Min:97.7 °F (36.5 °C), Max:98.4 °F (36.9 °C) Physical Exam Lower Extremity Dressings removed bilateral feet. Wound right plantar 5th metatarsal base is dermis deep and clean. Wound left heel is to sub-Q but clean. Wound lateral left midfoot has jacinta pus present with erythema and edema. DP and PT 2/4; CFT less than 3 seconds Sensation intact to light touch CT left foot: Suspicious for osteomyelitis of the 5th metatarsal, but subchondral bone cysts complicate reading. Data Review:  
Recent Results (from the past 24 hour(s)) GLUCOSE, POC Collection Time: 20  9:08 PM  
Result Value Ref Range Glucose (POC) 157 (H) 65 - 100 mg/dL Performed by Namita Francis PROTHROMBIN TIME + INR Collection Time: 20  4:48 AM  
Result Value Ref Range INR 1.1 0.9 - 1.1  Prothrombin time 11.7 (H) 9.0 - 11.1 sec METABOLIC PANEL, BASIC Collection Time: 04/29/20  4:48 AM  
Result Value Ref Range Sodium 138 136 - 145 mmol/L Potassium 3.6 3.5 - 5.1 mmol/L Chloride 108 97 - 108 mmol/L  
 CO2 25 21 - 32 mmol/L Anion gap 5 5 - 15 mmol/L Glucose 84 65 - 100 mg/dL BUN 18 6 - 20 MG/DL Creatinine 1.45 (H) 0.55 - 1.02 MG/DL  
 BUN/Creatinine ratio 12 12 - 20 GFR est AA 45 (L) >60 ml/min/1.73m2 GFR est non-AA 37 (L) >60 ml/min/1.73m2 Calcium 8.6 8.5 - 10.1 MG/DL  
GLUCOSE, POC Collection Time: 04/29/20  8:06 AM  
Result Value Ref Range Glucose (POC) 84 65 - 100 mg/dL Performed by Ave Lester GLUCOSE, POC Collection Time: 04/29/20 11:20 AM  
Result Value Ref Range Glucose (POC) 94 65 - 100 mg/dL Performed by Ave Lester PTT Collection Time: 04/29/20  4:10 PM  
Result Value Ref Range aPTT 24.9 22.1 - 32.0 sec  
 aPTT, therapeutic range     58.0 - 77.0 SECS  
CBC WITH AUTOMATED DIFF Collection Time: 04/29/20  4:10 PM  
Result Value Ref Range WBC 9.7 3.6 - 11.0 K/uL  
 RBC 2.96 (L) 3.80 - 5.20 M/uL HGB 8.5 (L) 11.5 - 16.0 g/dL HCT 26.8 (L) 35.0 - 47.0 % MCV 90.5 80.0 - 99.0 FL  
 MCH 28.7 26.0 - 34.0 PG  
 MCHC 31.7 30.0 - 36.5 g/dL  
 RDW 15.2 (H) 11.5 - 14.5 % PLATELET 097 (H) 324 - 400 K/uL MPV 9.7 8.9 - 12.9 FL  
 NRBC 0.0 0  WBC ABSOLUTE NRBC 0.00 0.00 - 0.01 K/uL NEUTROPHILS 76 (H) 32 - 75 % LYMPHOCYTES 9 (L) 12 - 49 % MONOCYTES 11 5 - 13 % EOSINOPHILS 2 0 - 7 % BASOPHILS 1 0 - 1 % IMMATURE GRANULOCYTES 1 (H) 0.0 - 0.5 % ABS. NEUTROPHILS 7.4 1.8 - 8.0 K/UL  
 ABS. LYMPHOCYTES 0.9 0.8 - 3.5 K/UL  
 ABS. MONOCYTES 1.1 (H) 0.0 - 1.0 K/UL  
 ABS. EOSINOPHILS 0.2 0.0 - 0.4 K/UL  
 ABS. BASOPHILS 0.1 0.0 - 0.1 K/UL  
 ABS. IMM. GRANS. 0.1 (H) 0.00 - 0.04 K/UL  
 DF AUTOMATED    
GLUCOSE, POC Collection Time: 04/29/20  4:27 PM  
Result Value Ref Range Glucose (POC) 117 (H) 65 - 100 mg/dL Performed by Sony Woo Impression:  
Osteomyelitis left foot Recommendation: I discussed waiting to see how the left foot responds to IV antibiotics since she will need a 6 week course for the endocarditis. The patient says she is \"done with the foot\" and wants the BKA in large part because of the risk of continued infection. Vascular Sx is already aware of her decision, so I will sign off.

## 2020-04-29 NOTE — PROGRESS NOTES
Hospitalist Progress Note NAME: Yesica Christie :  1960 MRN:  [id number] I reviewed with Dr. Annie Banks about the medical history and the findings on the physical examination. I discussed with Dr. Annie Banks the patient's diagnosis and concur with the plan. Interim Hospital Summary: 60 y.o. female whom presented on 2020 with fever due to infected left foot and possible OM of left metatarsals. Pt was discharged after being treated for left leg cellulitis on 3/20/2020. Assessment / Plan: 
Discussed about the surgery options. Pt agreed with proceed with surgical intervention. But, she would like to talk with the surgeon one more time prior to the surgery. I informed the vascular surgery team. 
 
Sepsis secondary to left foot cellulitis and OM left metatarsals 3,4,and 5 
- appreciate podiatrist's input; concerned with prolonged IV ABX tmt. Dr. Gladys Montalvo will discuss with Vascular service on either a BKA vs Chopart's amputation of left side CT of lower extremity: Large soft tissue ulcer at the base of the fifth metatarsal. No definite evidence of osteomyelitis Scheduled for BKA this Friday by Dr. Teddy Cm. Placed NPO after midnight of 2020 Bacteremia with MSSA 
- continue with ID team's input; duration of the therapy, 6 weeks from  (device removal date) BC ()  MSSA -  - LAC 
  BC ()  MSSA -  - RAC 
  BC ()  NG 
  WC () Light  MSSA,few Strep Gp G beta hemolytic ECHO - possible vegetation on AV Pacemaker present - no swelling , erythema MIKE - mobile vegetation on pacer lead; the lead has been extracted on  Continue with IV vanc Vegetation on pacemaker lead S/p dual lead extraction and device removal 2020 by Dr. George August 
Anemia of chronic disease - INR 1.1 & hgb 8.3 Transfuse PRN if hgb <7.0 Atrial fibrillation Hypercoagulable state secondary to afib HTN 
SSS 
- Continue Cardizem, doxazosin, hydralazine & metoprolol Coumadin on hold. Heparin drip until the surgery per cardiology 
  
PARAG on CKD stage IV 
- creat trending down 1.4 from 1.9 on admission Avoid nephrotoxic agents 
  
Type 2 DM with hyperglycemia 
- Hgb A1c 7.5 Check [facility] blood glucose and follow SSI 
  
Depression and anxiety 
- Continue sertraline and buspirone  
  
30.0 - 39.9 Obese / Body mass index is 34.87 kg/m². Code status: Full Prophylaxis: SCD's Recommended Disposition:  PT, OT, RN Subjective: Chief Complaint / Reason for Physician Visit \"It seems like I don't have much choice. I want to have the surgery, but I want to talk to him before the surgery. \"  Discussed with RN events overnight. Review of Systems: 
Symptom Y/N Comments  Symptom Y/N Comments Fever/Chills n   Chest Pain n   
Poor Appetite    Edema Cough    Abdominal Pain Sputum    Joint Pain SOB/CHOW n   Pruritis/Rash Nausea/vomit n   Tolerating PT/OT Diarrhea n   Tolerating Diet Constipation n   Other Could NOT obtain due to:   
 
Objective: VITALS:  
Last 24hrs VS reviewed since prior progress note. Most recent are: 
Patient Vitals for the past 24 hrs: 
 Temp Pulse Resp BP SpO2  
04/29/20 0443 97.7 °F (36.5 °C) 66 18 134/58 97 % 04/28/20 2249 98.4 °F (36.9 °C) (!) 56 20 114/44 96 % 04/28/20 2115    120/49   
04/28/20 1913 97.9 °F (36.6 °C) 73 18 107/41 97 % 04/28/20 1513 98.7 °F (37.1 °C) 63 16 145/57   
04/28/20 1116 98 °F (36.7 °C) (!) 59 16 123/50   
04/28/20 1048  64 13 120/41 98 % 04/28/20 1000  72 18 120/41 99 % 04/28/20 0900  65 14 130/54 97 % 04/28/20 0800  66 15 148/59 98 % 04/28/20 0700  67 20 139/59 98 % Intake/Output Summary (Last 24 hours) at 4/29/2020 5280 Last data filed at 4/28/2020 2122 Gross per 24 hour Intake  Output 800 ml Net -800 ml PHYSICAL EXAM: 
General: Ill appearing. Alert, cooperative, no acute distress EENT:  EOMI. Anicteric sclerae. MMM Resp:  Clear in apex with decreased breath sounds at bases. no wheezing or rales. No accessory muscle use CV:  Regular  rhythm,  No edema GI:  Soft, Non distended, Non tender. +Bowel sounds Neurologic:  Alert and oriented X 3, normal speech, Psych:   Fair insight. Not anxious nor agitated Skin:  No rashes. No jaundice, left foot dressing dry and intact Reviewed most current lab test results and cultures  YES Reviewed most current radiology test results   YES Review and summation of old records today    NO Reviewed patient's current orders and MAR    YES 
PMH/SH reviewed - no change compared to H&P 
________________________________________________________________________ Care Plan discussed with: 
  Comments Patient y Family RN y   
Care Manager Consultant Multidiciplinary team rounds were held today with , nursing, pharmacist and clinical coordinator. Patient's plan of care was discussed; medications were reviewed and discharge planning was addressed. ________________________________________________________________________ Parmjitun Jonnathan Carpio NP Procedures: see electronic medical records for all procedures/Xrays and details which were not copied into this note but were reviewed prior to creation of Plan. LABS: 
I reviewed today's most current labs and imaging studies. Pertinent labs include: 
Recent Labs  
  04/28/20 
0457 04/27/20 
1630 04/27/20 
0304 WBC 8.8 8.8 10.6 HGB 8.3* 8.6* 7.9*  
HCT 26.8* 27.2* 25.3*  
* 293 482* Recent Labs  
  04/29/20 
0448 04/28/20 
0457 04/27/20 
0304  141 138  
K 3.6 3.5 3.5  109* 108 CO2 25 26 23 GLU 84 95 105* BUN 18 18 16 CREA 1.45* 1.35* 1.34* CA 8.6 8.6 8.9 MG  --  2.2  --   
INR 1.1 1.2* 1.2* Signed: )Paul Sandra NP

## 2020-04-29 NOTE — ROUTINE PROCESS
RAPID RESPONSE TEAM- Follow Up Rounded on patient due to transfer from CCU. Patient is in bed, alert, NAD. No RRT interventions indicated at this time. Please call back if needed. Laurie Ugalde, GURINDER Clancy Vitals w/ MEWS Score (last day) Date/Time MEWS Score Pulse Resp Temp BP Level of Consciousness SpO2  
 04/29/20 0800  1  70  16  98.1 °F (36.7 °C)  161/62  Alert  99 % 04/29/20 0443  1  66  18  97.7 °F (36.5 °C)  134/58  Alert  97 % 04/28/20 2249  1  (!) 56  20  98.4 °F (36.9 °C)  114/44  Alert  96 % 04/28/20 2115          120/49      
 04/28/20 1913  1  73  18  97.9 °F (36.6 °C)  107/41  Alert  97 % 04/28/20 1513  1  63  16  98.7 °F (37.1 °C)  145/57  Alert    
 04/28/20 1116  1  (!) 59  16  98 °F (36.7 °C)  123/50  Alert    
 04/28/20 1048    64  13    120/41    98 % 04/28/20 1000    72  18    120/41  Alert  99 % 04/28/20 0900    65  14    130/54    97 % 04/28/20 0800    66  15    148/59    98 % 04/28/20 0700    67  20    139/59    98 % 04/28/20 0600    66  14    142/57    96 % 04/28/20 0500    68  14    131/55    96 % 04/28/20 0400  0  73  13  97.9 °F (36.6 °C)  144/56  Alert  95 % 04/28/20 0300    71  12    124/52    96 % 04/28/20 0200    67  13    119/47    95 % 04/28/20 0100    68  13    121/46    94 % 04/28/20 0000  1  73  20  98.8 °F (37.1 °C)  129/45  Alert  96 %

## 2020-04-29 NOTE — PROGRESS NOTES
Received report from Diego Poon RN. Assumed care of patient. 2030 Heparin gtt started at 9 units/kg/hr. Verified rate with pharmacist, Aurelia Estrada prior to starting.  
 
0300 Pt's PTT is 33.6. Pt given 4,000 unit bolus and heparin gtt increased by 4 units/kg. New heparin rate is 13 units/kg/hr. Verified with pharmacist. Next PTT due at 0900. Bedside shift change report GIVEN TO Eliot Arguelles RN. Report included the following information SBAR. SIGNIFICANT CHANGES DURING SHIFT:   
 
 
CONCERNS TO ADDRESS WITH MD:   
 
 
 
 
[facility] NURSING NOTE Admission Date 4/19/2020 Admission Diagnosis Sepsis ([facility] [A41.9] Consults IP CONSULT TO HOSPITALIST 
IP CONSULT TO PODIATRY IP CONSULT TO INFECTIOUS DISEASES 
IP CONSULT TO CARDIOLOGY 
IP CONSULT TO VASCULAR SURGERY Cardiac Monitoring [x] Yes [] No  
  
Purposeful Hourly Rounding [x] Yes   
Rayne Score Total Score: 3 Rayne score 3 or > [] Bed Alarm [] Avasys [] 1:1 sitter [] Patient refused (Signed refusal form in chart) Valdemar Score Valdemar Score: 19 Valdemar score 14 or < [] PMT consult [] Wound Care consult  
 []  Specialty bed  [] Nutrition consult Influenza Vaccine Received Flu Vaccine for Current Season (usually Sept-March): Yes Oxygen needs? [x] Room air Oxygen @  []1L    []2L    []3L   []4L    []5L   []6L via NC Chronic home O2 use? [] Yes [x] No 
Perform O2 challenge test and document in progress note using smartphrase (.Homeoxygen) Last bowel movement Last Bowel Movement Date: 04/28/20 Urinary Catheter External Female Catheter 04/20/20-Urine Output (mL): 700 ml LDAs Peripheral IV Left External jugular (Active) Site Assessment Clean, dry, & intact 4/30/2020  4:00 AM  
Phlebitis Assessment 0 4/30/2020  4:00 AM  
Infiltration Assessment 0 4/30/2020  4:00 AM  
Dressing Status Clean, dry, & intact 4/30/2020  4:00 AM  
Dressing Type Tape;Transparent 4/30/2020  4:00 AM  
Hub Color/Line Status Flushed 4/30/2020  4:00 AM  
Action Taken Open ports on tubing capped 4/27/2020  8:00 PM  
Alcohol Cap Used No 4/28/2020  4:00 AM  
      
External Female Catheter 04/20/20 (Active) Site Assessment Clean, dry, & intact 4/29/2020 11:03 PM  
Repositioned Yes 4/29/2020 11:03 PM  
Perineal Care Yes 4/29/2020 11:03 PM  
Wick Changed Yes 4/29/2020 11:03 PM  
Suction Canister/Tubing Changed Yes 4/29/2020 11:03 PM  
Urine Output (mL) 700 ml 4/29/2020 11:03 PM  
            
  
Readmission Risk Assessment Tool Score High Risk 36 Total Score 3 Has Seen PCP in Last 6 Months (Yes=3, No=0) 2 . Living with Significant Other. Assisted Living. LTAC. SNF. or  
Rehab  
 3 Patient Length of Stay (>5 days = 3) 9 IP Visits Last 12 Months (1-3=4, 4=9, >4=11) 23 Charlson Comorbidity Score (Age + Comorbid Conditions) Criteria that do not apply:  
 Pt. Coverage (Medicare=5 , Medicaid, or Self-Pay=4) Expected Length of Stay 5d 16h Actual Length of Stay 10

## 2020-04-29 NOTE — PROGRESS NOTES
TEE: Home with Follow-up Appointments 11:00am- CM called pt's via phone due to COVID-19 restriction to discuss d/c plan. CM introduced self and role. CM discussed with pt about home health. Pt declined home health. CM will continue to follow patient for discharge planning needs and arrange for services as deemed necessary. Darryle Chandler, Luite Tee [address] [place] [address] 
[phone]

## 2020-04-30 NOTE — PROGRESS NOTES
Infectious Disease Progress IMPRESSION:  
 
 
- MSSA Bacteremia & R/sided endocarditis 
  with pacemaker lead infection BC - 4/19-  MSSA - 1/1 - LAC BC-  4/19-  MSSA - 1/1 - RAC BC-  4/21-  NG 
  -S/p removal of dual lead Pacemaker on 4/27 MIKE - mobile vegetation on pacer lead Initial lead extraction attempted unsuccessful on 4/24 
   - Cellulitis of LLE , recurrent Previous admissions for same - 3/8-3/22, 1/14-1/20 RLE cellulitis - 12/6-12/9/19 
 - Diabetic foot ulcer , OM of foot plan for BKA in am 
  CT L/foot - no definite evidence of OM, D/w Dr Stas Pandey, gross pus ++ in wound WC - 4/20- Light  MSSA,few Strep Gp G beta hemolytic, Anaerobic -4/21-Light Diphtheroids Previous WC - 3/10- MSSA , Proteus mirabilis Xray - 
: Soft tissue wound at the level of the third through fifth proximal 
metatarsals. Underlying cortical irregularity and lucency within the proximal 
third through fifth metatarsals may represent osteomyelitis. - CKD 4 Cr increase to 1.47 
-Atial fibrillation - ESR / CRP 85/14.4  
- Diabetes mellitus with neuropathy A1c 7.5 
- Penicillin allergy - hives PLAN:  
  
 -  Pt will need long course of antibiotic therapy - 6 weeks  . Progressive increase in Cr has been noted on Vancomycin, pt has CKD4 . Will change to Daptomycin IV  
  -- Weekly CBC, CMP & ESR/ CRP every other week  Fax reports to [phone], call with abnormal results at [phone]. Call with antibiotic related adverse events - Nephrology Consult in am    
 
 
Subjective:  
 
Pt seen . Feels better overall. D/w pt Cr rise , plan is for long course of therapy. As pt is PCN allergic she is on Vancomycin, 
 but that is causing  Cr rise . Pt voiced understanding Patient Active Problem List  
Diagnosis Code  History of breast cancer Z85.3  Asthma J45.909  Fe deficiency anemia D50.9  Status post ablation of atrial fibrillation Z98.890, Z86.79  
 Sick sinus syndrome ([facility] I49.5  S/P cardiac pacemaker procedure Z95.0  Long term (current) use of anticoagulants Z79.01  
 Mixed hyperlipidemia E78.2  CKD (chronic kidney disease), stage IV ([facility]) N18.4  Systolic murmur I45.5  Essential hypertension I10  
 PAF (paroxysmal atrial fibrillation) ([facility]) I48.0  Anemia in stage 4 chronic kidney disease ([facility]) N18.4, D63.1  Controlled type 2 diabetes mellitus with stage 4 chronic kidney disease, with long-term current use of insulin ([facility]) E11.22, N18.4, Z79.4  Morbid obesity with BMI of 40.0-44.9, adult (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) E11.42  Venous stasis ulcer of right lower leg with edema of right lower leg ([facility]) I83.019, I83.891, L97.919, R60.9  Diabetic ulcer of left heel associated with type 2 diabetes mellitus, with fat layer exposed ([address] E11.621, W78.053  
 Diabetic ulcer of right midfoot associated with type 2 diabetes mellitus, with fat layer exposed ([address] E11.621, R44.073  Foot deformity, acquired, left M21.962  Foot deformity, right M21.961  Pes cavus Q66.70  Type 2 diabetes mellitus with left diabetic foot infection ([facility]) E11.628, L08.9  Traumatic hematoma of foot, left, initial encounter W79.26NZ  Diabetic ulcer of left midfoot with necrosis of muscle ([address] E11.621, B47.057  Left leg cellulitis [address]  Atrial fibrillation with RVR ([facility]) I48.91  
 H/O bilateral mastectomy Z90.13  Sepsis ([address] A41.9 Past Medical History:  
Diagnosis Date  Acquired lymphedema Right arm  Arrhythmia  Asthma  Atrial fibrillation ([address] Dr. Edwin Hays and Dr. Melissa Chambers [facility])  Cancer ([address] bilateral breast  
 Chronic kidney disease  Diabetes mellitus type II   
 Dizziness  Essential hypertension  Hyperlipidemia  Hypertension  Long term (current) use of anticoagulants  Morbid obesity ([facility] 3/14/2012  Nausea & vomiting  Neuropathy  Osteoarthritis  Pacemaker  Sick sinus syndrome ([facility] [facility] Family History Problem Relation Age of Onset  Cancer Mother  Heart Disease Mother  Alcohol abuse Father  Diabetes Brother  Hypertension Brother Social History Tobacco Use  Smoking status: Never Smoker  Smokeless tobacco: Never Used Substance Use Topics  Alcohol use: Yes Comment: rare Past Surgical History:  
Procedure Laterality Date  ABDOMEN SURGERY PROC UNLISTED    
 gastric bypass  GASTRIC BYPASS,OBESE<150CM SAW-EN-Y  7/08  
 hutcher  HX AFIB ABLATION    
 HX BREAST RECONSTRUCTION Bilateral   
 HX CARPAL TUNNEL RELEASE [address] [facility] [address] [facility] [address] [facility] RIGHT MODIFIED RADICAL   
 HX MASTECTOMY Left   
 no lymphnode removal  
 HX MOHS PROCEDURES  1995  
 right  HX ORTHOPAEDIC Right   
 knee surgery  HX PACEMAKER    
 HX PACEMAKER    
 IR INSERT TUNL CVC W PORT OVER 5 YEARS    
 LAMINECTOMY,CERVICAL  1994  
 NEEDLE BIOPSY LIVER,W OTHR [address]  8.21.07  
 MO [address] AND 1994  MO RELOCATION OF SKIN POCKET FOR PACEMAKER N/A 4/24/2020 RELOCATE 2 [facility] performed by Chelsie Toussaint MD at [address] CATH LAB  MO RMVL TRANSVNS PM ELTRD 1 LEAD SYS ATR/VENTR N/A 4/24/2020 Remove Pacemaker Dual Leads performed by Chelsie Toussanit MD at [address] CARDIAC CATH LAB  MO RMVL TRANSVNS PM ELTRD 1 LEAD SYS ATR/VENTR N/A 4/27/2020 REMOVE PACEMAKER DUAL LEADS performed by Chelsie Toussaint MD at [address] CATH LAB  MO TRABECULOPLASTY BY LASER SURGERY    
 US GUIDE ASP OVARIAN CYST ABD APPROACH  8.21.07  
 RIGHT Prior to Admission medications Medication Sig Start Date End Date Taking? Authorizing Provider  
metOLazone (ZAROXOLYN) 2.5 mg tablet Take 1 tablet po on Mon and Thurs for worsening leg swelling. 4/13/20  Yes Cecil Lipscomb MD  
warfarin (COUMADIN) 4 mg tablet Take 1 tablet on Tues and Sat and a half tablet the other 5 days. 4/10/20  Yes Cecil Lipscomb MD  
bumetanide (BUMEX) 1 mg tablet Take 2 Tabs by mouth daily. 3/26/20  Yes Cecil Lipscomb MD  
insulin glargine (Lantus U-100 Insulin) 100 unit/mL injection Inject 14 units daily 3/26/20  Yes Cecil Lipscomb MD  
metoprolol succinate (TOPROL-XL) 100 mg tablet Take 2 Tabs by mouth daily. 3/26/20  Yes Cecil Lipscomb MD  
dilTIAZem CD (CARDIZEM CD) 180 mg ER capsule Take 1 Cap by mouth daily. 3/22/20  Yes Tonie Rogers MD  
hydrALAZINE (APRESOLINE) 50 mg tablet Take 1 Tab by mouth three (3) times daily. 3/22/20  Yes Tonie Rogers MD  
acetaminophen 500 mg tab 500 mg, diphenhydrAMINE 25 mg cap 25 mg Take 2 Doses by mouth nightly. Yes Provider, Historical  
sertraline (ZOLOFT) 50 mg tablet TAKE ONE AND ONE-HALF (1 & 1/2) TABLET BY MOUTH DAILY 8/22/19  Yes Cecil Lipscomb MD  
doxazosin (CARDURA) 4 mg tablet TAKE ONE TABLET BY MOUTH ONCE NIGHTLY 6/14/19  Yes Cecil Lipscomb MD  
busPIRone (BUSPAR) 10 mg tablet TAKE ONE TABLET BY MOUTH TWICE A DAY 5/24/19  Yes Cecil Lipscomb MD  
cholecalciferol, VITAMIN D3, (VITAMIN D3) 5,000 unit tab tablet Take 5,000 Units by mouth daily. Yes Provider, Historical  
vitamin A 8,000 unit capsule Take 8,000 Units by mouth daily. Yes Provider, Historical  
insulin lispro (HUMALOG) 100 unit/mL kwikpen 2 units with dinner for sugars over 200 1/3/14  Yes Cecil Lipscomb MD  
cyanocobalamin (VITAMIN B-12) 1,000 mcg sublingual tablet Take 1,000 mcg by mouth daily. Yes Provider, Historical  
 
Allergies Allergen Reactions  Ace Inhibitors Cough  Amlodipine Swelling  Avapro [Irbesartan] Unable to Obtain  Bactrim [Sulfamethoxazole-Trimethoprim] Other (comments) Sore mouth  Captopril Cough  Carvedilol Diarrhea  Contrast Agent [Iodine] Itching [address]  Morphine Nausea and Vomiting and Swelling  Penicillins Hives Did not tolerate cefepime 3/2020  Pravachol [Pravastatin] Nausea Only  Seafood [Shellfish Containing Products] Hives  Singulair [Montelukast] Other (comments)  
  palpitations Review of Systems:  A comprehensive review of systems was negative except for that written in the History of Present Illness. 10 point review of systems obtained . All other systems negative Objective:  
Blood pressure 125/54, pulse 63, temperature 98.1 °F (36.7 °C), resp. rate 18, height 5' 10\" (1.778 m), weight 229 lb 4.5 oz (104 kg), SpO2 94 %, not currently breastfeeding. Temp (24hrs), Av.2 °F (36.8 °C), Min:98 °F (36.7 °C), Max:98.4 °F (36.9 °C) Current Facility-Administered Medications Medication Dose Route Frequency  [START ON 2020] Vancomycin level due  prior to 1200 dose. Thanks! 1 Each Other ONCE  
 vancomycin (VANCOCIN) 500 mg in 0.9% sodium chloride (MBP/ADV) 100 mL  500 mg IntraVENous Q24H  
 heparin 25,000 units in D5W 250 ml infusion  9-25 Units/kg/hr IntraVENous TITRATE  heparin (porcine) injection 4,000 Units  4,000 Units IntraVENous Q6H PRN  
 heparin (porcine) injection 2,000 Units  2,000 Units IntraVENous Q6H PRN  
 metoprolol succinate (TOPROL-XL) XL tablet 50 mg  50 mg Oral DAILY  oxyCODONE-acetaminophen (PERCOCET) 5-325 mg per tablet 1 Tab  1 Tab Oral Q4H PRN  
 fentaNYL citrate (PF) injection 25-50 mcg  25-50 mcg IntraVENous Multiple  hydrALAZINE (APRESOLINE) 20 mg/mL injection 10 mg  10 mg IntraVENous Q6H PRN  
 dilTIAZem CD (CARDIZEM CD) capsule 180 mg  180 mg Oral DAILY  insulin glargine (LANTUS) injection 10 Units  10 Units SubCUTAneous DAILY  bumetanide (BUMEX) tablet 2 mg  2 mg Oral DAILY  busPIRone (BUSPAR) tablet 10 mg  10 mg Oral BID  doxazosin (CARDURA) tablet 4 mg  4 mg Oral QHS  hydrALAZINE (APRESOLINE) tablet 50 mg  50 mg Oral TID  sertraline (ZOLOFT) tablet 50 mg  50 mg Oral DAILY  glucose chewable tablet 16 g  4 Tab Oral PRN  
 dextrose (D50W) injection syrg 12.5-25 g  25-50 mL IntraVENous PRN  
 glucagon (GLUCAGEN) injection 1 mg  1 mg IntraMUSCular PRN  
 insulin lispro (HUMALOG) injection   SubCUTAneous AC&HS  sodium chloride (NS) flush 5-10 mL  5-10 mL IntraVENous PRN Exam:   
General:  Awake cooperative, Eyes:  Sclera anicteric. Pupils equally round and reactive to light. Mouth/Throat: Mucous membranes normal, oral pharynx clear Neck: Supple Lungs:   Clear to auscultation CV:  Regular rate and rhythm,no murmur, click, rub or gallop Abdomen:   Soft, non-tender. bowel sounds normal. non-distended Extremities: No cyanosis or edema Skin: Skin color, texture, turgor normal. no acute rash or lesions Lymph nodes: Cervical and supraclavicular normal  
Musculoskeletal: LLE swelling, warm, foot dressing + Lines/Devices:  Intact, no erythema, drainage or tenderness Psych: Alert and oriented, normal mood affect Data Review: CBC:  
Recent Labs 04/30/20 
0207 04/29/20 
1610 04/28/20 
0457 WBC 9.8 9.7 8.8  
RBC 2.80* 2.96* 2.91* HGB 8.1* 8.5* 8.3* HCT 26.0* 26.8* 26.8*  
* 528* 506* GRANS 74 76* 75  
LYMPH 13 9* 11* EOS 1 2 2 CMP:  
Recent Labs 04/30/20 
0207 04/29/20 
0448 04/28/20 
9678 GLU 98 84 95  138 141  
K 3.3* 3.6 3.5  108 109* CO2 26 25 26 BUN 19 18 18 CREA 1.47* 1.45* 1.35* CA 8.3* 8.6 8.6 AGAP 6 5 6 BUCR 13 12 13 AP 72  --   --   
TP 6.5  --   --   
ALB 2.2*  --   --   
GLOB 4.3*  --   --   
AGRAT 0.5*  --   --   
 
 
Lab Results Component Value Date/Time  Culture result: NO GROWTH 6 DAYS 04/21/2020 08:50 AM  
 Culture result: NO ANAEROBES ISOLATED 04/21/2020 07:20 AM  
 Culture result: LIGHT DIPHTHEROIDS (A) 04/21/2020 07:20 AM  
 Culture result: LIGHT STAPHYLOCOCCUS AUREUS (A) 04/20/2020 05:48 PM  
 Culture result: (A) 04/20/2020 05:48 PM  
  FEW STREPTOCOCCI, BETA HEMOLYTIC GROUP G Penicillin and ampicillin are drugs of choice for treatment of beta-hemolytic streptococcal infections. Susceptibility testing of penicillins and beta-lactams approved by the FDA for treatment of beta-hemolytic streptococcal infections need not be performed routinely, because nonsusceptible isolates are extremely rare. CLSI 2012 XR Results (most recent): 
Results from Hospital Encounter encounter on 04/19/20 XR CHEST PORT Narrative Portable chest single view dated 4/20/2020 Comparison chest dated 4/19/2020 History is status post pacemaker change. A single portable AP upright view of the chest was obtained. The patient is 
slightly rotated towards the right. It is difficult to accurately assess the 
size of the cardiac silhouette. There has been interval removal of the cardiac 
pacer device and leads which were present on the left. A metallic wire projects 
over the upper portion of the right hemithorax. There is a suggestion of some 
increased density in the medial aspect of the upper portion of the right 
hemithorax. This may be associated with the brachiocephalic vasculature. A 
follow-up, true frontal examination of the chest may prove useful. Surgical 
clips project over the right lung base. The costophrenic angles are sharp in 
appearance. There is evidence of postsurgical change involving the cervical 
spine. Impression IMPRESSION: 
1. Interval removal of the cardiac pacer device and leads as described above. 2. Presence of a radiopaque wire which projects over the upper portion of the 
right hemithorax. ICD-10-CM ICD-9-CM 1. Ulcer of left foot, unspecified ulcer stage ([facility] L97.529 707.15   
2.  Osteomyelitis of left foot, unspecified type ([address] M86.9 730.27 3. Cellulitis of left lower extremity L03.116 682.6 4. Disorder of cardiac pacemaker system, subsequent encounter T82. 9XXD V58.89 ELECTROPHYSIOLOGY PROCEDURE  
  996.72 ELECTROPHYSIOLOGY PROCEDURE  
   ELECTROPHYSIOLOGY PROCEDURE  
   ELECTROPHYSIOLOGY PROCEDURE  
   CANCELED: INVASIVE VASCULAR PROCEDURE  
   CANCELED: INVASIVE VASCULAR PROCEDURE Antibiotic History Vancomycin IV I have discussed the diagnosis with the patient and the intended plan as seen in the above orders. I have discussed medication side effects and warnings with the patient as well. Reviewed test results at length with patient Signed By: Chava Mike MD FACP

## 2020-04-30 NOTE — PROGRESS NOTES
Received report from Torsten Pedroza RN. Assumed care of patient. 0630 Report given to Jessenia Recinos. Pt transported to OR via stretcher. Bedside shift change report GIVEN TO Hank Alexander RN. Report included the following information SBAR. SIGNIFICANT CHANGES DURING SHIFT:  Heparin gtt dc'd at midnight. NPO for surgery. CONCERNS TO ADDRESS WITH MD:   
 
 
 
 
[facility] NURSING NOTE Admission Date 4/19/2020 Admission Diagnosis Sepsis ([address] [A41.9] Consults IP CONSULT TO HOSPITALIST 
IP CONSULT TO PODIATRY IP CONSULT TO INFECTIOUS DISEASES 
IP CONSULT TO NEPHROLOGY 
IP CONSULT TO CARDIOLOGY 
IP CONSULT TO VASCULAR SURGERY Cardiac Monitoring [x] Yes [] No  
  
Purposeful Hourly Rounding [x] Yes   
Rayne Score Total Score: 3 Rayne score 3 or > [x] Bed Alarm [] Avasys [] 1:1 sitter [] Patient refused (Signed refusal form in chart) Valdemar Score Valdemar Score: 19 Valdemar score 14 or < [] PMT consult [] Wound Care consult  
 []  Specialty bed  [] Nutrition consult Influenza Vaccine Received Flu Vaccine for Current Season (usually Sept-March): Yes Oxygen needs? [x] Room air Oxygen @  []1L    []2L    []3L   []4L    []5L   []6L via NC Chronic home O2 use? [] Yes [x] No 
Perform O2 challenge test and document in progress note using smartphBilbuse (.Homeoxygen) Last bowel movement Last Bowel Movement Date: 04/28/20 Urinary Catheter External Female Catheter 04/20/20-Urine Output (mL): 400 ml LDAs Peripheral IV 04/30/20 Left Hand (Active) Site Assessment Clean, dry, & intact 5/1/2020  4:00 AM  
Phlebitis Assessment 0 5/1/2020  4:00 AM  
Infiltration Assessment 0 5/1/2020  4:00 AM  
Dressing Status Clean, dry, & intact 5/1/2020  4:00 AM  
Dressing Type Tape;Transparent 5/1/2020  4:00 AM  
Hub Color/Line Status Blue;Flushed 5/1/2020  4:00 AM  
      
External Female Catheter 04/20/20 (Active) Site Assessment Clean, dry, & intact 4/29/2020 11:03 PM Repositioned Yes 4/29/2020 11:03 PM  
Perineal Care Yes 4/29/2020 11:03 PM  
Wick Changed Yes 4/29/2020 11:03 PM  
Suction Canister/Tubing Changed Yes 4/29/2020 11:03 PM  
Urine Output (mL) 400 ml 4/30/2020 11:38 PM  
            
  
Readmission Risk Assessment Tool Score High Risk 36 Total Score 3 Has Seen PCP in Last 6 Months (Yes=3, No=0) 2 . Living with Significant Other. Assisted Living. LTAC. SNF. or  
Rehab  
 3 Patient Length of Stay (>5 days = 3) 9 IP Visits Last 12 Months (1-3=4, 4=9, >4=11) 23 Charlson Comorbidity Score (Age + Comorbid Conditions) Criteria that do not apply:  
 Pt. Coverage (Medicare=5 , Medicaid, or Self-Pay=4) Expected Length of Stay 5d 16h Actual Length of Stay 11

## 2020-04-30 NOTE — PROGRESS NOTES
TEE: Home with Follow-up Appointments 9:20am- CM called pt's room due to COVID-19 restrictions via phone. CM discussed with pt about DME due to pt requesting places that rent wheelchairs. CM informed pt that [facility] rents wheelchairs and that CM will put it on her AVS. Pt requested for CM address to provide CM with a list of places that her previous employer provided that her insurance may cover a wheelchair. CM will continue to follow patient for discharge planning needs and arrange for services as deemed necessary. Gene Black [address] 
[phone]

## 2020-04-30 NOTE — PROGRESS NOTES
Infectious Disease Progress IMPRESSION:  
 
- Sepsis - MSSA Bacteremia & R/sided endocarditis 
  with pacemaker lead infection BC - 4/19-  MSSA - 1/1 - LAC BC-  4/19-  MSSA - 1/1 - RAC BC-  4/21-  NG 
  -S/p removal of dual lead Pacemaker on 4/27 MIKE - mobile vegetation on pacer lead Initial lead extraction attempted unsuccessful on 4/24 
   - Cellulitis of LLE , recurrent Previous admissions for same - 3/8-3/22, 1/14-1/20 RLE cellulitis - 12/6-12/9/19 
 - Diabetic foot ulcer , probable OM of foot CT L/foot - no definite evidence of OM, D/w Dr Ruben Rogers, gross pus ++ in wound WC - 4/20- Light  MSSA,few Strep Gp G beta hemolytic, Anaerobic -4/21-Light Diphtheroids Previous WC - 3/10- MSSA , Proteus mirabilis Xray - 
: Soft tissue wound at the level of the third through fifth proximal 
metatarsals. Underlying cortical irregularity and lucency within the proximal 
third through fifth metatarsals may represent osteomyelitis. - CKD 4 Cr 1.45 
-Atial fibrillation - ESR / CRP 85/14.4  
- Diabetes mellitus with neuropathy A1c 7.5 
- Penicillin allergy - hives PLAN:  
  
 -  Vancomycin IV x 6 weeks  . Pt planned for BKA on Friday will need to start counting from then as R/foot is probable source of infection - Pharmacy on Consult for antibiotic dosing, target Vanc trough 15 - Monitor Cr, Vanc trough- Cr 1.45, Vanc trough 25.9 
  - Weekly CBC, CMP & ESR/ CRP every other week  Fax reports to [phone], call with abnormal results at [phone]. Call with antibiotic related adverse events - Podiatry / Vascular recommendations Subjective:  
 
Pt seen . Feels better overall Pt states she is tired of the recurrent  infections & ready to have th BKA Patient Active Problem List  
Diagnosis Code  History of breast cancer Z85.3  Asthma J45.909  Fe deficiency anemia D50.9  Status post ablation of atrial fibrillation Z98.890, Z86.79  
 Sick sinus syndrome ([facility]) I49.5  S/P cardiac pacemaker procedure Z95.0  Long term (current) use of anticoagulants Z79.01  
 Mixed hyperlipidemia E78.2  CKD (chronic kidney disease), stage IV ([facility]) N18.4  Systolic murmur H45.4  Essential hypertension I10  
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  
 Diabetic ulcer of right midfoot associated with type 2 diabetes mellitus, with fat layer exposed ([address] E11.621, J86.137  Foot deformity, acquired, left M21.962  Foot deformity, right M21.961  Pes cavus Q66.70  Type 2 diabetes mellitus with left diabetic foot infection ([facility]) E11.628, L08.9  Traumatic hematoma of foot, left, initial encounter P73.97WP  Diabetic ulcer of left midfoot with necrosis of muscle ([address] E11.621, O02.304  Left leg cellulitis [address]. [address]  Atrial fibrillation with RVR ([facility]) I48.91  
 H/O bilateral mastectomy Z90.13  Sepsis ([address] A41.9 Past Medical History:  
Diagnosis Date  Acquired lymphedema Right arm  Arrhythmia  Asthma  Atrial fibrillation ([address] Dr. Golden Gallegos and Dr. Domingo alves [facility])  Cancer ([address] bilateral breast  
 Chronic kidney disease  Diabetes mellitus type II   
 Dizziness  Essential hypertension  Hyperlipidemia  Hypertension  Long term (current) use of anticoagulants  Morbid obesity ([facility] 3/14/2012  Nausea & vomiting  Neuropathy  Osteoarthritis  Pacemaker  Sick sinus syndrome ([facility] Family History Problem Relation Age of Onset  Cancer Mother  Heart Disease Mother  Alcohol abuse Father  Diabetes Brother  Hypertension Brother Social History Tobacco Use  Smoking status: Never Smoker  Smokeless tobacco: Never Used Substance Use Topics  Alcohol use: Yes Comment: rare Past Surgical History:  
Procedure Laterality Date  ABDOMEN SURGERY PROC UNLISTED    
 gastric bypass  GASTRIC BYPASS,OBESE<150CM SAW-EN-Y  7/08  
 [facility]  HX AFIB ABLATION    
 HX BREAST RECONSTRUCTION Bilateral   
 HX CARPAL TUNNEL RELEASE [address] [facility] [address] [facility] [address] [facility] RIGHT MODIFIED RADICAL   
 HX MASTECTOMY Left   
 no lymphnode removal  
 HX MOHS PROCEDURES  1995  
 right  HX ORTHOPAEDIC Right   
 knee surgery  HX PACEMAKER    
 HX PACEMAKER    
 IR INSERT TUNL CVC W PORT OVER 5 YEARS    
 LAMINECTOMY,CERVICAL  1994  
 NEEDLE BIOPSY LIVER,W OTHR [address]  8.21.07  
 MT [address] AND 1994  MT RELOCATION OF SKIN POCKET FOR PACEMAKER N/A 4/24/2020 RELOCATE 2 [facility] performed by Danielle Scott MD at [address] CATH LAB  MT RMVL TRANSVNS PM ELTRD 1 LEAD SYS ATR/VENTR N/A 4/24/2020 Remove Pacemaker Dual Leads performed by Danielle Scott MD at [address]  CARDIAC CATH LAB  MT RMVL TRANSVNS PM ELTRD 1 LEAD SYS ATR/VENTR N/A 4/27/2020 REMOVE PACEMAKER DUAL LEADS performed by Danielle Scott MD at [address] CATH LAB  MT TRABECULOPLASTY BY LASER SURGERY    
 US GUIDE ASP OVARIAN CYST ABD APPROACH  8.21.07  
 RIGHT Prior to Admission medications Medication Sig Start Date End Date Taking? Authorizing Provider  
metOLazone (ZAROXOLYN) 2.5 mg tablet Take 1 tablet po on Mon and Thurs for worsening leg swelling. 4/13/20  Yes Jolyne Hashimoto, MD  
warfarin (COUMADIN) 4 mg tablet Take 1 tablet on Tues and Sat and a half tablet the other 5 days. 4/10/20  Yes Jolyne Hashimoto, MD  
bumetanide (BUMEX) 1 mg tablet Take 2 Tabs by mouth daily. 3/26/20  Yes Jolyne Hashimoto, MD  
insulin glargine (Lantus U-100 Insulin) 100 unit/mL injection Inject 14 units daily 3/26/20  Yes Jolyne Hashimoto, MD  
metoprolol succinate (TOPROL-XL) 100 mg tablet Take 2 Tabs by mouth daily. 3/26/20  Yes Jolyne Hashimoto, MD  
dilTIAZem CD (CARDIZEM CD) 180 mg ER capsule Take 1 Cap by mouth daily. 3/22/20  Yes Adama Mccloud MD  
hydrALAZINE (APRESOLINE) 50 mg tablet Take 1 Tab by mouth three (3) times daily. 3/22/20  Yes Adama Mccloud MD  
acetaminophen 500 mg tab 500 mg, diphenhydrAMINE 25 mg cap 25 mg Take 2 Doses by mouth nightly. Yes Provider, Historical  
sertraline (ZOLOFT) 50 mg tablet TAKE ONE AND ONE-HALF (1 & 1/2) TABLET BY MOUTH DAILY 8/22/19  Yes Jolyne Hashimoto, MD  
doxazosin (CARDURA) 4 mg tablet TAKE ONE TABLET BY MOUTH ONCE NIGHTLY 6/14/19  Yes Jolyne Hashimoto, MD  
busPIRone (BUSPAR) 10 mg tablet TAKE ONE TABLET BY MOUTH TWICE A DAY 5/24/19  Yes Jolyne Hashimoto, MD  
cholecalciferol, VITAMIN D3, (VITAMIN D3) 5,000 unit tab tablet Take 5,000 Units by mouth daily. Yes Provider, Historical  
vitamin A 8,000 unit capsule Take 8,000 Units by mouth daily. Yes Provider, Historical  
insulin lispro (HUMALOG) 100 unit/mL kwikpen 2 units with dinner for sugars over 200 1/3/14  Yes Jolyne Hashimoto, MD  
cyanocobalamin (VITAMIN B-12) 1,000 mcg sublingual tablet Take 1,000 mcg by mouth daily. Yes Provider, Historical  
 
Allergies Allergen Reactions  Ace Inhibitors Cough  Amlodipine Swelling  Avapro [Irbesartan] Unable to Obtain  Bactrim [Sulfamethoxazole-Trimethoprim] Other (comments) Sore mouth  Captopril Cough  Carvedilol Diarrhea  Contrast Agent [Iodine] Itching [address]  Morphine Nausea and Vomiting and Swelling  Penicillins Hives Did not tolerate cefepime 3/2020  Pravachol [Pravastatin] Nausea Only  Seafood [Shellfish Containing Products] Hives  Singulair [Montelukast] Other (comments)  
  palpitations Review of Systems:  A comprehensive review of systems was negative except for that written in the History of Present Illness. 10 point review of systems obtained . All other systems negative Objective:  
Blood pressure 126/64, pulse 73, temperature 98.1 °F (36.7 °C), resp. rate 20, height 5' 10\" (1.778 m), weight 231 lb (104.8 kg), SpO2 98 %, not currently breastfeeding. Temp (24hrs), Av.1 °F (36.7 °C), Min:97.7 °F (36.5 °C), Max:98.4 °F (36.9 °C) Current Facility-Administered Medications Medication Dose Route Frequency  [START ON 2020] vancomycin (VANCOCIN) 500 mg in 0.9% sodium chloride (MBP/ADV) 100 mL  500 mg IntraVENous Q24H  
 heparin 25,000 units in D5W 250 ml infusion  9-25 Units/kg/hr IntraVENous TITRATE  [START ON 2020] heparin (porcine) injection 4,000 Units  4,000 Units IntraVENous Q6H PRN  
 [START ON 2020] heparin (porcine) injection 2,000 Units  2,000 Units IntraVENous Q6H PRN  
 metoprolol succinate (TOPROL-XL) XL tablet 50 mg  50 mg Oral DAILY  alcohol 62% (NOZIN) nasal  1 Ampule  1 Ampule Topical Q12H  
 oxyCODONE-acetaminophen (PERCOCET) 5-325 mg per tablet 1 Tab  1 Tab Oral Q4H PRN  
 fentaNYL citrate (PF) injection 25-50 mcg  25-50 mcg IntraVENous Multiple  hydrALAZINE (APRESOLINE) 20 mg/mL injection 10 mg  10 mg IntraVENous Q6H PRN  
 dilTIAZem CD (CARDIZEM CD) capsule 180 mg  180 mg Oral DAILY  insulin glargine (LANTUS) injection 10 Units  10 Units SubCUTAneous DAILY  bumetanide (BUMEX) tablet 2 mg 2 mg Oral DAILY  busPIRone (BUSPAR) tablet 10 mg  10 mg Oral BID  doxazosin (CARDURA) tablet 4 mg  4 mg Oral QHS  hydrALAZINE (APRESOLINE) tablet 50 mg  50 mg Oral TID  sertraline (ZOLOFT) tablet 50 mg  50 mg Oral DAILY  glucose chewable tablet 16 g  4 Tab Oral PRN  
 dextrose (D50W) injection syrg 12.5-25 g  25-50 mL IntraVENous PRN  
 glucagon (GLUCAGEN) injection 1 mg  1 mg IntraMUSCular PRN  
 insulin lispro (HUMALOG) injection   SubCUTAneous AC&HS  sodium chloride (NS) flush 5-10 mL  5-10 mL IntraVENous PRN Exam:   
General:  Awake cooperative, Eyes:  Sclera anicteric. Pupils equally round and reactive to light. Mouth/Throat: Mucous membranes normal, oral pharynx clear Neck: Supple Lungs:   Clear to auscultation CV:  Regular rate and rhythm,no murmur, click, rub or gallop Abdomen:   Soft, non-tender. bowel sounds normal. non-distended Extremities: No cyanosis or edema Skin: Skin color, texture, turgor normal. no acute rash or lesions Lymph nodes: Cervical and supraclavicular normal  
Musculoskeletal: LLE swelling, warm, foot dressing + Lines/Devices:  Intact, no erythema, drainage or tenderness Psych: Alert and oriented, normal mood affect Data Review: CBC:  
Recent Labs  
  04/29/20 
1610 04/28/20 
0457 04/27/20 
1630 WBC 9.7 8.8 8.8  
RBC 2.96* 2.91* 2.95* HGB 8.5* 8.3* 8.6* HCT 26.8* 26.8* 27.2*  
* 506* 293 GRANS 76* 75 75 LYMPH 9* 11* 11* EOS 2 2 2 CMP:  
Recent Labs  
  04/29/20 
0448 04/28/20 
0457 04/27/20 
0304 GLU 84 95 105*  141 138  
K 3.6 3.5 3.5  109* 108 CO2 25 26 23 BUN 18 18 16 CREA 1.45* 1.35* 1.34* CA 8.6 8.6 8.9 AGAP 5 6 7 BUCR 12 13 12 Lab Results Component Value Date/Time  Culture result: NO GROWTH 6 DAYS 04/21/2020 08:50 AM  
 Culture result: NO ANAEROBES ISOLATED 04/21/2020 07:20 AM  
 Culture result: LIGHT DIPHTHEROIDS (A) 04/21/2020 07:20 AM  
 Culture result: LIGHT STAPHYLOCOCCUS AUREUS (A) 04/20/2020 05:48 PM  
 Culture result: (A) 04/20/2020 05:48 PM  
  FEW STREPTOCOCCI, BETA HEMOLYTIC GROUP G Penicillin and ampicillin are drugs of choice for treatment of beta-hemolytic streptococcal infections. Susceptibility testing of penicillins and beta-lactams approved by the FDA for treatment of beta-hemolytic streptococcal infections need not be performed routinely, because nonsusceptible isolates are extremely rare. CLSI 2012 XR Results (most recent): 
Results from Hospital Encounter encounter on 04/19/20 XR CHEST PORT Narrative Portable chest single view dated 4/20/2020 Comparison chest dated 4/19/2020 History is status post pacemaker change. A single portable AP upright view of the chest was obtained. The patient is 
slightly rotated towards the right. It is difficult to accurately assess the 
size of the cardiac silhouette. There has been interval removal of the cardiac 
pacer device and leads which were present on the left. A metallic wire projects 
over the upper portion of the right hemithorax. There is a suggestion of some 
increased density in the medial aspect of the upper portion of the right 
hemithorax. This may be associated with the brachiocephalic vasculature. A 
follow-up, true frontal examination of the chest may prove useful. Surgical 
clips project over the right lung base. The costophrenic angles are sharp in 
appearance. There is evidence of postsurgical change involving the cervical 
spine. Impression IMPRESSION: 
1. Interval removal of the cardiac pacer device and leads as described above. 2. Presence of a radiopaque wire which projects over the upper portion of the 
right hemithorax. ICD-10-CM ICD-9-CM 1. Ulcer of left foot, unspecified ulcer stage ([address] L97.529 707.15   
2. Osteomyelitis of left foot, unspecified type ([address] M86.9 730.27 3. Cellulitis of left lower extremity L03.116 682.6 4.  Disorder of cardiac pacemaker system, subsequent encounter T82. 9XXD V58.89 ELECTROPHYSIOLOGY PROCEDURE  
  996.72 ELECTROPHYSIOLOGY PROCEDURE  
   ELECTROPHYSIOLOGY PROCEDURE  
   ELECTROPHYSIOLOGY PROCEDURE  
   CANCELED: INVASIVE VASCULAR PROCEDURE  
   CANCELED: INVASIVE VASCULAR PROCEDURE Antibiotic History I have discussed the diagnosis with the patient and the intended plan as seen in the above orders. I have discussed medication side effects and warnings with the patient as well. Reviewed test results at length with patient Signed By: Brea Guerrero MD FACP

## 2020-04-30 NOTE — PROGRESS NOTES
Hospitalist Progress Note NAME: Keith Lopez :  1960 MRN:  [id number] I reviewed with Dr. Natanael Bhakta about the medical history and the findings on the physical examination. I discussed with Dr. Natanael Bhakta the patient's diagnosis and concur with the plan. Interim Hospital Summary: 60 y.o. female whom presented on 2020 with fever due to infected left foot and possible OM of left metatarsals. Pt was discharged after being treated for left leg cellulitis on 3/20/2020. Assessment / Plan: 
Discussed about the surgery options. Pt agreed with proceed with surgical intervention. But, she would like to talk with the surgeon one more time prior to the surgery. I informed the vascular surgery team. 
 
Sepsis secondary to left foot cellulitis and OM left metatarsals 3,4,and 5 
- appreciate podiatrist's input; concerned with prolonged IV ABX tmt. Dr. Suleiman Wolfe will discuss with Vascular service on either a BKA vs Chopart's amputation of left side CT of lower extremity: Large soft tissue ulcer at the base of the fifth metatarsal. No definite evidence of osteomyelitis Scheduled for BKA this Friday by Dr. Nidhi Bhat. Placed NPO after midnight of 2020 Bacteremia with MSSA 
- continue with ID team's input; duration of the therapy, 6 weeks from  (device removal date) BC ()  MSSA -  - LAC 
  BC ()  MSSA -  - RAC 
  BC ()  NG 
  WC () Light  MSSA,few Strep Gp G beta hemolytic ECHO - possible vegetation on AV Pacemaker present - no swelling , erythema MIKE - mobile vegetation on pacer lead; the lead has been extracted on  Continue with IV vanc Vegetation on pacemaker lead S/p dual lead extraction and device removal 2020 by Dr. Damon Claros 
Anemia of chronic disease - INR 1.1 & hgb 8.3 Transfuse PRN if hgb <7.0 Atrial fibrillation Hypercoagulable state secondary to afib HTN 
SSS 
- Continue Cardizem, doxazosin, hydralazine & metoprolol Coumadin on hold. Heparin drip until the surgery per cardiology; d/c heparin drip around midnight of 5/1/2020 for surgery 
  
PARAG on CKD stage IV 
- creat trending down 1.4 from 1.9 on admission Avoid nephrotoxic agents 
  
Type 2 DM with hyperglycemia 
- Hgb A1c 7.5 Check qac/qhs blood glucose and follow SSI 
  
Depression and anxiety 
- Continue sertraline and buspirone  
  
30.0 - 39.9 Obese / Body mass index is 34.87 kg/m². Code status: Full Prophylaxis: SCD's Recommended Disposition:  PT, OT, RN Subjective: Chief Complaint / Reason for Physician Visit \"It seems like I don't have much choice. I want to have the surgery, but I want to talk to him before the surgery. \"  Discussed with RN events overnight. Review of Systems: 
Symptom Y/N Comments  Symptom Y/N Comments Fever/Chills n   Chest Pain n   
Poor Appetite    Edema Cough    Abdominal Pain Sputum    Joint Pain SOB/CHOW n   Pruritis/Rash Nausea/vomit n   Tolerating PT/OT Diarrhea n   Tolerating Diet Constipation n   Other Could NOT obtain due to:   
 
Objective: VITALS:  
Last 24hrs VS reviewed since prior progress note. Most recent are: 
Patient Vitals for the past 24 hrs: 
 Temp Pulse Resp BP SpO2  
04/30/20 0214 98.2 °F (36.8 °C) 73 20 124/53 95 % 04/29/20 2300 98.4 °F (36.9 °C) 74 18 125/53 95 % 04/29/20 1929 98.1 °F (36.7 °C) 73 20 126/64 98 % 04/29/20 1547 98.2 °F (36.8 °C) 60 18 138/53 99 % 04/29/20 1122 98.1 °F (36.7 °C) 67 18 139/55 98 % 04/29/20 0800 98.1 °F (36.7 °C) 70 16 161/62 99 % Intake/Output Summary (Last 24 hours) at 4/30/2020 [facility] Last data filed at 4/30/2020 0602 Gross per 24 hour Intake  Output 2350 ml Net -2350 ml PHYSICAL EXAM: 
General: Ill appearing. Alert, cooperative, no acute distress EENT:  EOMI. Anicteric sclerae. MMM Resp:  Clear in apex with decreased breath sounds at bases. no wheezing or rales. No accessory muscle use CV: Regular  rhythm,  No edema GI:  Soft, Non distended, Non tender. +Bowel sounds Neurologic:  Alert and oriented X 3, normal speech, Psych:   Fair insight. Not anxious nor agitated Skin:  No rashes. No jaundice, left foot dressing dry and intact Reviewed most current lab test results and cultures  YES Reviewed most current radiology test results   YES Review and summation of old records today    NO Reviewed patient's current orders and MAR    YES 
PMH/SH reviewed - no change compared to H&P 
________________________________________________________________________ Care Plan discussed with: 
  Comments Patient y Family RN y   
Care Manager Consultant  y Vascular surgery NP Multidiciplinary team rounds were held today with , nursing, pharmacist and clinical coordinator. Patient's plan of care was discussed; medications were reviewed and discharge planning was addressed. ________________________________________________________________________ Osei Torres NP Procedures: see electronic medical records for all procedures/Xrays and details which were not copied into this note but were reviewed prior to creation of Plan. LABS: 
I reviewed today's most current labs and imaging studies. Pertinent labs include: 
Recent Labs 04/30/20 
0207 04/29/20 
1610 04/28/20 
0457 WBC 9.8 9.7 8.8 HGB 8.1* 8.5* 8.3* HCT 26.0* 26.8* 26.8*  
* 528* 506* Recent Labs 04/30/20 
0207 04/29/20 
0448 04/28/20 
2218  138 141  
K 3.3* 3.6 3.5  108 109* CO2 26 25 26 GLU 98 84 95 BUN 19 18 18 CREA 1.47* 1.45* 1.35* CA 8.3* 8.6 8.6 MG 2.0  --  2.2 ALB 2.2*  --   --   
TBILI 0.3  --   --   
SGOT 12*  --   --   
ALT <6*  --   --   
INR 1.2* 1.1 1.2* Signed: )Elvie Chawla, NP

## 2020-04-30 NOTE — PROGRESS NOTES
Pharmacy Automatic Renal Dosing Protocol - Antimicrobials Indication for Antimicrobials: MSSA Bacteremia & R/sided endocarditis 
  with pacemaker lead infection Cellulitis of LLE , recurrent Diabetic foot ulcer , OM of foot plan for BKA in am;  CT L/foot - no definite evidence of OM, D/w Dr Suleiman Wolfe, gross pus ++ in wound WC - - Light  MSSA,few Strep Gp G beta hemolytic, Anaerobic --Light Diphtheroids Previous WC - 3/10- MSSA , Proteus mirabilis Current Regimen of Each Antimicrobial: 
Daptomycin 8mg/kg (rounded to 900mg) IV q24h - started 4/30 x 6 weeks Previous Antimicrobial Therapy: 
Vancomycin pharmacy to dose  (Start Date ; Day # 11) Cefepime 1 g IV every 12 hours (Start Date ; Day # 2) Levofloxacin 750 mg IV every 48 hours (Start Date ; Day # 6) Metronidazole 500 mg IV every 12 hours (Start Date ; Day # 6) Significant Cultures:  
 Blood: GPC in clusters in - final (S to oxacilin), MSSA  
 Wound: light staph aureus (MSSA), beta hemolytic strep  wound (anaerobic) cx: light diptheroids : blood - NGTD - final 
 
Radiology / Imaging results: (X-ray, CT scan or MRI):  
 Lt Foot: Soft tissue wound at the level of the third through fifth proximal metatarsals. Underlying cortical irregularity and lucency within the proximal third through fifth metatarsals may represent osteomyelitis Paralysis, amputations, malnutrition: None Labs: 
Recent Labs 20 
0207 20 
1610 20 
0448 20 
0457 CREA 1.47*  --  1.45* 1.35* BUN 19  --  18 18 WBC 9.8 9.7  --  8.8 Temp (24hrs), Av.2 °F (36.8 °C), Min:98 °F (36.7 °C), Max:98.4 °F (36.9 °C) Creatinine Clearance (mL/min) or Dialysis: 44.0 mL/min (IBW) Impression/Plan:  
+++ID following+++ PCN allergic = hives BKA  am 
Last dose of Vancomycin  5pm thus covered until  post op Transition to dapto per ID given CKD 4 and SCr increase Initiate Daptomycin 900mg IV q24h (noon 5/1 is fine); try to keep on noon schedule to facilitate discharge planning. (Pharmacist please do not round dose down) Antimicrobial stop date: 6 weeks - to end 6/11/20 per Dr. Karl Mascorro (6 weeks from 5/1 amputation per 4/30 ID note) I have ordered CK weekly (Mondays) x 6 and ID plans to check labs weekly Pharmacy will follow daily and adjust medications as appropriate for renal function and/or serum levels.  
 
Thank you, 
Nathen Barrios [facility]

## 2020-04-30 NOTE — PROGRESS NOTES
Problem: Falls - Risk of 
Goal: *Absence of Falls Description: Document Nyla Marie Fall Risk and appropriate interventions in the flowsheet. Outcome: Progressing Towards Goal 
Note: Fall Risk Interventions: 
Mobility Interventions: Bed/chair exit alarm, OT consult for ADLs, Patient to call before getting OOB, PT Consult for mobility concerns, PT Consult for assist device competence, Strengthening exercises (ROM-active/passive), Utilize walker, cane, or other assistive device Medication Interventions: Bed/chair exit alarm, Evaluate medications/consider consulting pharmacy, Patient to call before getting OOB, Teach patient to arise slowly Elimination Interventions: Bed/chair exit alarm, Call light in reach, Patient to call for help with toileting needs

## 2020-05-01 NOTE — CONSULTS
Called with consult, but upon review of the chart, Ms. Nolan Segura has been seen by Dr. Marcelle Johnston. Changed consult to Dr. Harry Newsome. Joanna Grant

## 2020-05-01 NOTE — ADDENDUM NOTE
Addendum  created 05/01/20 1010 by Vasyl Pressley CRNA Intraprocedure Event edited, Intraprocedure Flowsheets edited, Intraprocedure LDAs edited, LDA properties accepted
Oriented - self; Oriented - place; Oriented - time

## 2020-05-01 NOTE — PROGRESS NOTES
Problem: Falls - Risk of 
Goal: *Absence of Falls Description: Document Onalee Gum Fall Risk and appropriate interventions in the flowsheet. Outcome: Progressing Towards Goal 
Note: Fall Risk Interventions: 
Mobility Interventions: Assess mobility with egress test, Bed/chair exit alarm, OT consult for ADLs, Patient to call before getting OOB, PT Consult for assist device competence, PT Consult for mobility concerns, Utilize walker, cane, or other assistive device Medication Interventions: Assess postural VS orthostatic hypotension, Bed/chair exit alarm, Patient to call before getting OOB, Teach patient to arise slowly Elimination Interventions: Bed/chair exit alarm, Call light in reach, Patient to call for help with toileting needs, Toilet paper/wipes in reach, Toileting schedule/hourly rounds, Stay With Me (per policy) Problem: Pressure Injury - Risk of 
Goal: *Prevention of pressure injury Description: Document Valdmear Scale and appropriate interventions in the flowsheet. Outcome: Progressing Towards Goal 
Note: Pressure Injury Interventions: 
Sensory Interventions: Assess changes in LOC, Assess need for specialty bed, Float heels, Discuss PT/OT consult with provider, Check visual cues for pain, Maintain/enhance activity level, Monitor skin under medical devices, Sit a 90-degree angle/use footstool if needed, Turn and reposition approx. every two hours (pillows and wedges if needed) Moisture Interventions: Absorbent underpads, Apply protective barrier, creams and emollients, Limit adult briefs, Internal/External urinary devices, Maintain skin hydration (lotion/cream), Minimize layers Activity Interventions: Assess need for specialty bed, Chair cushion, Pressure redistribution bed/mattress(bed type), Increase time out of bed, PT/OT evaluation Mobility Interventions: Assess need for specialty bed, HOB 30 degrees or less, Float heels, Chair cushion, PT/OT evaluation Nutrition Interventions: Document food/fluid/supplement intake, Discuss nutritional consult with provider, Offer support with meals,snacks and hydration Friction and Shear Interventions: Feet elevated on foot rest, Foam dressings/transparent film/skin sealants, Lift sheet, Sit at 90-degree angle, Transfer aides:transfer board/Estefania lift/ceiling lift, Trapeze to reposition Problem: Pain Goal: *Control of Pain Outcome: Progressing Towards Goal

## 2020-05-01 NOTE — PROGRESS NOTES
0700: Bedside and Verbal shift change report given to Soraya Mcdaniel RN (oncoming nurse) by Miya Valladares RN (offgoing nurse). Report included the following information SBAR. Unable to assess patient as pt AYANNA to OR for scheduled BKA. 3443: TRANSFER - IN REPORT: 
 
Verbal report received from PACU RN Brian Bolanos RN(name) on Nelsy Rodriguez  being received from PACU (unit) for ROUTINE POST-OP ordered procedure Report consisted of patients Situation, Background, Assessment and  
Recommendations(SBAR). Information from the following report(s) SBAR and Kardex was reviewed with the receiving nurse. Opportunity for questions and clarification was provided. Assessment completed upon patients arrival to unit and care assumed. 1039: Paged Dewayne North NP in regards to heparin gtt. Per Dewayne North NP hold off on restarting and will verify this. 1100: Per Dewayne North NP do not restart heparin gtt or coumadin until Dr. Marlyn Alicea see's patient. Encouraged pt to utilize incentive spirometer 10 times every hour and to turn cough and deep breathe. Pt verbalized understanding and able to demonstrate appropriate use of IS.  
 
1214: Called patient's nephrology consult for Dr. Carina Isbell. Awaiting call back.   
1218: Spoke with Dr. Carina Isbell with nephrology who will see patient today,

## 2020-05-01 NOTE — PROGRESS NOTES
Hospitalist Progress Note NAME: Lloyd Pimentel :  1960 MRN:  [id number] I reviewed with Dr. Lenka Ha about the medical history and the findings on the physical examination. I discussed with Dr. Lenka Ha the patient's diagnosis and concur with the plan. Interim Hospital Summary: 60 y.o. female whom presented on 2020 with fever due to infected left foot and possible OM of left metatarsals. Pt was discharged after being treated for left leg cellulitis on 3/20/2020. Assessment / Plan: 
Sepsis secondary to left foot cellulitis and OM left metatarsals 3,4,and 5 S/p Left BKA 2020 by Dr. Imelda Erazo - appreciate podiatrist's input; concerned with prolonged IV ABX tmt. Dr. Yesica Thakkar will discuss with Vascular service on either a BKA vs Chopart's amputation of left side CT of lower extremity: Large soft tissue ulcer at the base of the fifth metatarsal. No definite evidence of osteomyelitis Post op care (pain management, DVT prophylaxis, activity, wound care) per Vascular Surgery Team 
   We will hold [facility] until clear by Vascular surgery team 
 
Bacteremia with MSSA 
- continue with ID team's input; duration of the therapy, 6 weeks from  from the surgery date BC ()  MSSA -  - LAC 
  BC ()  MSSA -  - RAC 
  BC ()  NG 
  WC () Light  MSSA,few Strep Gp G beta hemolytic ECHO - possible vegetation on AV Pacemaker present - no swelling , erythema MIKE - mobile vegetation on pacer lead; the lead has been extracted on  IV vanc was changed IV Daptomycin due to worsening of creat Weekly CBC, CMP & ESR/ CRP every other week  Fax reports to [phone], call with abnormal results at [phone]. Call with antibiotic related adverse events Vegetation on pacemaker lead S/p dual lead extraction and device removal 2020 by Dr. Nelda Stockton 
Anemia of chronic disease - INR 1.1 & hgb 8.3 Transfuse PRN if hgb <7.0 Atrial fibrillation Hypercoagulable state secondary to afib HTN 
SSS 
- Continue Cardizem, doxazosin, hydralazine & metoprolol Coumadin on hold. Heparin drip until the surgery per cardiology; d/c heparin drip around midnight of 5/1/2020 for surgery 
  
PARAG on CKD stage IV 
- creat trending up 1.6; was 1.9 on admission Avoid nephrotoxic agents Nephrology has been consulted by ID team 
  
Type 2 DM with hyperglycemia 
- Hgb A1c 7.5 Check qac/qhs blood glucose and follow SSI 
  
Depression and anxiety 
- Continue sertraline and buspirone  
  
30.0 - 39.9 Obese / Body mass index is 34.87 kg/m². Code status: Full Prophylaxis: SCD's Recommended Disposition: Rehab Subjective: Chief Complaint / Reason for Physician Visit \"I really want to go home, I miss my cat. \"  Discussed with RN events overnight. Review of Systems: 
Symptom Y/N Comments  Symptom Y/N Comments Fever/Chills n   Chest Pain n   
Poor Appetite    Edema Cough    Abdominal Pain Sputum    Joint Pain SOB/CHOW n   Pruritis/Rash Nausea/vomit n   Tolerating PT/OT Diarrhea n   Tolerating Diet Constipation n   Other Could NOT obtain due to:   
 
Objective: VITALS:  
Last 24hrs VS reviewed since prior progress note. Most recent are: 
Patient Vitals for the past 24 hrs: 
 Temp Pulse Resp BP SpO2  
05/01/20 0636 97.8 °F (36.6 °C) 80 22 151/62 99 % 05/01/20 0419 98 °F (36.7 °C) 74 20 136/57 97 % 04/30/20 2333 97.7 °F (36.5 °C) 69 18 107/58 98 % 04/30/20 1939 97.7 °F (36.5 °C) 62 20 124/49 99 % 04/30/20 1523 98.1 °F (36.7 °C) 63 18 125/54 94 % 04/30/20 1102 98 °F (36.7 °C) 64 20 131/60 96 % 04/30/20 0840 98.4 °F (36.9 °C) 79 20 151/61 95 % Intake/Output Summary (Last 24 hours) at 5/1/2020 5403 Last data filed at 4/30/2020 2338 Gross per 24 hour Intake 300 ml Output 2100 ml Net -1800 ml PHYSICAL EXAM: 
General: Ill appearing. Alert, cooperative, no acute distress EENT:  EOMI. Anicteric sclerae. MMM Resp:  Clear in apex with decreased breath sounds at bases. no wheezing or rales. No accessory muscle use CV:  Regular  rhythm,  No edema GI:  Soft, Non distended, Non tender. +Bowel sounds Neurologic:  Alert and oriented X 3, normal speech, Psych:   Fair insight. Not anxious nor agitated Skin:  No rashes. No jaundice, left BKA site dressing dry and intact. Discoloration of right lower 1/3 leg to foot; no changes Reviewed most current lab test results and cultures  YES Reviewed most current radiology test results   YES Review and summation of old records today    NO Reviewed patient's current orders and MAR    YES 
PMH/SH reviewed - no change compared to H&P 
________________________________________________________________________ Care Plan discussed with: 
  Comments Patient y Family RN y   
Care Manager Consultant Multidiciplinary team rounds were held today with , nursing, pharmacist and clinical coordinator. Patient's plan of care was discussed; medications were reviewed and discharge planning was addressed. ________________________________________________________________________ Osei Moser NP Procedures: see electronic medical records for all procedures/Xrays and details which were not copied into this note but were reviewed prior to creation of Plan. LABS: 
I reviewed today's most current labs and imaging studies. Pertinent labs include: 
Recent Labs 04/30/20 
0207 04/29/20 
1610 WBC 9.8 9.7 HGB 8.1* 8.5* HCT 26.0* 26.8*  
* 528* Recent Labs 05/01/20 
7147 04/30/20 
0207 04/29/20 
0448  137 138  
K 3.7 3.3* 3.6  105 108 CO2 25 26 25 * 98 84 BUN 20 19 18 CREA 1.69* 1.47* 1.45* CA 8.6 8.3* 8.6 MG  --  2.0  --   
ALB  --  2.2*  --   
TBILI  --  0.3  --   
SGOT  --  12*  --   
ALT  --  <6*  --   
INR 1.1 1.2* 1.1 Signed: )Sean Hancock, NP

## 2020-05-01 NOTE — BRIEF OP NOTE
Brief Postoperative Note Patient: Cristy Wong YOB: 1960 MRN: [id number] Date of Procedure: 5/1/2020 Pre-Op Diagnosis: OSTEOMYELITIS LEFT FOOT Post-Op Diagnosis: same Procedure(s): LEFT BELOW KNEE AMPUTATION Surgeon(s): 
Treva Rowland MD 
 
Anesthesia: General  
 
Estimated Blood Loss (mL): 100cc Complications: none Specimens:  
ID Type Source Tests Collected by Time Destination 1 : Left leg Fresh Leg, Left  Treva Rowland MD 5/1/2020 2910 Pathology Implants: none Findings: good arterial supply; venous hypertension Electronically Signed by Qamar Booth MD on 5/1/2020 at 8:57 AM

## 2020-05-01 NOTE — PROGRESS NOTES
Problem: Falls - Risk of 
Goal: *Absence of Falls Description: Document Onalee Gum Fall Risk and appropriate interventions in the flowsheet. Outcome: Progressing Towards Goal 
Note: Fall Risk Interventions: 
Mobility Interventions: Bed/chair exit alarm, OT consult for ADLs, Patient to call before getting OOB, PT Consult for mobility concerns, PT Consult for assist device competence, Strengthening exercises (ROM-active/passive), Utilize walker, cane, or other assistive device, Utilize gait belt for transfers/ambulation Medication Interventions: Bed/chair exit alarm, Evaluate medications/consider consulting pharmacy, Teach patient to arise slowly, Patient to call before getting OOB Elimination Interventions: Bed/chair exit alarm, Call light in reach, Patient to call for help with toileting needs

## 2020-05-01 NOTE — PROGRESS NOTES
Nephrology Progress Note [facility] 
[email or web address]. [email or web address]                  Phone - [phone] Patient: Good Quan Date- 5/1/2020 Admit Date: 4/19/2020 YOB: 1960 CC: Follow up for parag on ckd Subjective: Interval History:  
-  Ms. Wharton has pmh of ckd, htn, dm. She is admitted with left foot cellulitis,. She is dx with MSSA sepsis. She has undergone LEFT BKA today She vegetation on pacemaker leads- pace maker removed on 4- She has developed parag with cr. 1.7 today She is on vanco. Her vanco level was 25.9 on 4- Her bp has been on low side. Her cr. Level has been higher in past 
 
Date. ..------. ....      . cr 
5-1-2020----------1.69 
4--------1.47 
4.22.2020---    ---1.52 
4---    --1.95 
Cr. Trends Ref. Range 1/20/2020 04:07 3/8/2020 11:25 3/9/2020 04:30 3/10/2020 01:58 3/11/2020 03:58 3/12/2020 03:54 3/13/2020 04:10 3/14/2020 02:22 3/15/2020 04:12 3/16/2020 00:53 3/17/2020 00:55 3/18/2020 04:01 3/19/2020 01:35 3/20/2020 00:52 3/21/2020 05:06 3/22/2020 03:27 4/19/2020 21:53 4/21/2020 05:33 4/22/2020 01:17 4/23/2020 02:21 4/24/2020 04:09 4/25/2020 00:53 4/26/2020 03:37 4/27/2020 03:04 4/28/2020 04:57 4/29/2020 04:48 4/30/2020 02:07 5/1/2020 04:12 Creatinine Latest Ref Range: 0.55 - 1.02 MG/DL 2.12 (H) 2.17 (H) 1.92 (H) 1.88 (H) 1.91 (H) 1.88 (H) 1.86 (H) 1.95 (H) 2.06 (H) 2.19 (H) 2.41 (H) 2.38 (H) 2.51 (H) 2.48 (H) 2.38 (H) 2.12 (H) 1.95 (H) 1.89 (H) 1.68 (H) 1.54 (H) 1.52 (H) 1.38 (H) 1.46 (H) 1.34 (H) 1.35 (H) 1.45 (H) 1.47 (H) 1.69 (H) · IMPRESSION:  
· PARAG - likely due to ATN , low bp, SEPSIS , ATN due to vanco.  
· MSSA sepsis · Removal of pace maker · S/p LEFT BKA · ckd  3 LIKELY due to DM nephropathy and HTN nephrosclerosis · Hyponatremia due to fluid overload · hypertension · Diabetic foot · Proteinuria likely due to DM nephropathy and HTN nephroscl- urine pr/cr 1.5 g/g · Metabolic acidosis · anemia iron defi - iron sats 10% · Vit d defi PLAN:  
· Hold bumex · Give ivf NACL x 1 liter · Lower hydralazine dose · Avoid hypotension · Avoid vancomycin · Check bmp in am 
· D/w patient and nurse ROS:- No c/o sob,  No c/o chest pain, No c/o nausea or vomiting, No c/o  fever. Physical exam:  
GEN: NAD NECK- Supple, no mass RESP: Clear b/l, no wheezing, No accessory muscle use CVS: S1,S2  RRR 
ABDO: soft , non tender, No mass NEURO: normal speech, non focal 
EXT: left BKA stump + Objective:  
Visit Vitals /50 Pulse 69 Temp 98 °F (36.7 °C) Resp 20 Ht 5' 10\" (1.778 m) Wt 104.3 kg (230 lb) SpO2 100% Breastfeeding No  
BMI 33.00 kg/m² Last 3 Recorded Weights in this Encounter 04/30/20 6795 05/01/20 0908 05/01/20 5583 Weight: 104 kg (229 lb 4.5 oz) 104.8 kg (231 lb) 104.3 kg (230 lb) 04/29 1901 - 05/01 0700 In: 300 [P.O.:300] Out: 3600 [[facility]:4223] Intake/Output Summary (Last 24 hours) at 5/1/2020 1619 Last data filed at 5/1/2020 1309 Gross per 24 hour Intake 990 ml Output 850 ml Net 140 ml Chart reviewed. Pertinent Notes reviewed. Data Review : 
Recent Labs 05/01/20 
0710 04/30/20 
0207 04/29/20 
0448  137 138  
K 3.7 3.3* 3.6  105 108 CO2 25 26 25 BUN 20 19 18 CREA 1.69* 1.47* 1.45* * 98 84 CA 8.6 8.3* 8.6 MG  --  2.0  --   
 
Recent Labs 04/30/20 
0207 04/29/20 
1610 WBC 9.8 9.7 HGB 8.1* 8.5* HCT 26.0* 26.8*  
* 528* No results for input(s): FE, TIBC, PSAT, FERR in the last 72 hours. Recent Labs 04/30/20 2032 CPK 20* Results for Marylee Leer (MRN [id number]) as of 5/1/2020 16:17 Ref.  Range 11/1/2013 15:35 8/11/2019 12:49 1/16/2020 18:40 1/17/2020 13:01 1/20/2020 08:30 3/10/2020 10:10 3/12/2020 13:12 4/21/2020 22:38 4/25/2020 00:53 4/26/2020 13:16 4/29/2020 16:10 4/30/2020 20:32 Vancomycin,trough Latest Ref Range: 5.0 - 10.0 ug/mL 25.8 (HH) 19.4 (H) 20.7 (HH)  24.9 (HH) 19.6 (H) 25.0 (HH) 20.6 (HH) 20.9 (HH) 20.8 (HH) 25.9 (HH) Vancomycin, random Latest Units: UG/ML    23.8        23.7 Lab Results Component Value Date/Time Color YELLOW/STRAW 03/08/2020 05:00 PM  
 Appearance CLOUDY (A) 03/08/2020 05:00 PM  
 Specific gravity 1.019 03/08/2020 05:00 PM  
 pH (UA) 5.0 03/08/2020 05:00 PM  
 Protein 100 (A) 03/08/2020 05:00 PM  
 Glucose 100 (A) 03/08/2020 05:00 PM  
 Ketone 15 (A) 03/08/2020 05:00 PM  
 Bilirubin NEGATIVE  03/08/2020 05:00 PM  
 Urobilinogen 0.2 03/08/2020 05:00 PM  
 Nitrites NEGATIVE  03/08/2020 05:00 PM  
 Leukocyte Esterase SMALL (A) 03/08/2020 05:00 PM  
 Epithelial cells FEW 03/08/2020 05:00 PM  
 Bacteria NEGATIVE  03/08/2020 05:00 PM  
 WBC 0-4 03/08/2020 05:00 PM  
 RBC 0-5 03/08/2020 05:00 PM  
 
Lab Results Component Value Date/Time Culture result: NO GROWTH 6 DAYS 04/21/2020 08:50 AM  
 Culture result: NO ANAEROBES ISOLATED 04/21/2020 07:20 AM  
 Culture result: LIGHT DIPHTHEROIDS (A) 04/21/2020 07:20 AM  
 
Lab Results Component Value Date/Time Specimen Description: RENEE 04/09/2014 12:27 PM  
 Specimen Description: RADHAMAREK 10/28/2013 05:00 PM  
 Specimen Description: [facility] 10/28/2013 03:09 PM  
 
Lab Results Component Value Date/Time Sodium,urine random 19 03/20/2020 01:20 PM  
 Creatinine, urine 125.00 03/20/2020 01:20 PM  
 
Results from [facility] [place] Encounter encounter on 04/19/20 XR CHEST PORT Narrative Portable chest single view dated 4/20/2020 Comparison chest dated 4/19/2020 History is status post pacemaker change. A single portable AP upright view of the chest was obtained. The patient is 
slightly rotated towards the right. It is difficult to accurately assess the 
size of the cardiac silhouette. There has been interval removal of the cardiac 
pacer device and leads which were present on the left.  A metallic wire projects 
over the upper portion of the right hemithorax. There is a suggestion of some 
increased density in the medial aspect of the upper portion of the right 
hemithorax. This may be associated with the brachiocephalic vasculature. A 
follow-up, true frontal examination of the chest may prove useful. Surgical 
clips project over the right lung base. The costophrenic angles are sharp in 
appearance. There is evidence of postsurgical change involving the cervical 
spine. Impression IMPRESSION: 
1. Interval removal of the cardiac pacer device and leads as described above. 2. Presence of a radiopaque wire which projects over the upper portion of the 
right hemithorax. Medication list  reviewed Current Facility-Administered Medications Medication  0.9% sodium chloride infusion 250 mL  fentaNYL citrate (PF) injection 25 mcg  DAPTOmycin (CUBICIN) 900 mg in 0.9% sodium chloride 50 mL IVPB  heparin 25,000 units in D5W 250 ml infusion  heparin (porcine) injection 4,000 Units  heparin (porcine) injection 2,000 Units  metoprolol succinate (TOPROL-XL) XL tablet 50 mg  
 oxyCODONE-acetaminophen (PERCOCET) 5-325 mg per tablet 1 Tab  fentaNYL citrate (PF) injection 25-50 mcg  hydrALAZINE (APRESOLINE) 20 mg/mL injection 10 mg  
 dilTIAZem CD (CARDIZEM CD) capsule 180 mg  
 insulin glargine (LANTUS) injection 10 Units  bumetanide (BUMEX) tablet 2 mg  busPIRone (BUSPAR) tablet 10 mg  
 doxazosin (CARDURA) tablet 4 mg  hydrALAZINE (APRESOLINE) tablet 50 mg  
 sertraline (ZOLOFT) tablet 50 mg  
 glucose chewable tablet 16 g  
 dextrose (D50W) injection syrg 12.5-25 g  
 glucagon (GLUCAGEN) injection 1 mg  
 insulin lispro (HUMALOG) injection  sodium chloride (NS) flush 5-10 mL Jari Simmonds, MD 
[facility] 
 www. [facility] [employer] [address] [place], [address] Phone - [phone] Fax - [phone]

## 2020-05-01 NOTE — ANESTHESIA POSTPROCEDURE EVALUATION
Procedure(s): LEFT BELOW KNEE AMPUTATION. general 
 
Anesthesia Post Evaluation Patient location during evaluation: PACU Note status: Adequate. Level of consciousness: responsive to verbal stimuli and sleepy but conscious Pain management: satisfactory to patient Airway patency: patent Anesthetic complications: no 
Cardiovascular status: acceptable Respiratory status: acceptable Hydration status: acceptable Comments: +Post-Anesthesia Evaluation and Assessment Patient: Britt Caro MRN: [id number]  SSN: [id number] YOB: 1960  Age: 60 y.o. Sex: female Cardiovascular Function/Vital Signs /48   Pulse 70   Temp 36.4 °C (97.6 °F)   Resp 10   Ht 5' 10\" (1.778 m)   Wt 104.3 kg (230 lb)   SpO2 100%   Breastfeeding No   BMI 33.00 kg/m² Patient is status post Procedure(s): LEFT BELOW KNEE AMPUTATION. Nausea/Vomiting: Controlled. Postoperative hydration reviewed and adequate. Pain: 
Pain Scale 1: Numeric (0 - 10) (05/01/20 0930) Pain Intensity 1: 5 (05/01/20 0930) Managed. Neurological Status:  
Neuro (WDL): Within Defined Limits (05/01/20 0853) At baseline. Mental Status and Level of Consciousness: Arousable. Pulmonary Status:  
O2 Device: CO2 nasal cannula (05/01/20 0930) Adequate oxygenation and airway patent. Complications related to anesthesia: None Post-anesthesia assessment completed. No concerns. Signed By: Cali Meléndez MD  
 5/1/2020 Post anesthesia nausea and vomiting:  controlled Vitals Value Taken Time /48 5/1/2020  9:30 AM  
Temp 36.4 °C (97.6 °F) 5/1/2020  8:53 AM  
Pulse 61 5/1/2020  9:46 AM  
Resp 8 5/1/2020  9:46 AM  
SpO2 100 % 5/1/2020  9:46 AM  
Vitals shown include unvalidated device data.

## 2020-05-01 NOTE — PROGRESS NOTES
Infectious Disease Progress IMPRESSION:  
 
 
- MSSA Bacteremia & R/sided endocarditis 
  with pacemaker lead infection BC - 4/19-  MSSA - 1/1 - LAC BC-  4/19-  MSSA - 1/1 - RAC BC-  4/21-  NG 
  -S/p removal of dual lead Pacemaker on 4/27 MIKE - mobile vegetation on pacer lead Initial lead extraction attempted unsuccessful on 4/24 
   - Cellulitis of LLE , recurrent, s/p BKA today ( 5/1) Previous admissions for same - 3/8-3/22, 1/14-1/20 RLE cellulitis - 12/6-12/9/19 
 - Diabetic foot ulcer , OM of foot plan for BKA in am 
  CT L/foot - no definite evidence of OM, D/w Dr KAMARA [facility], gross pus ++ in wound WC - 4/20- Light  MSSA,few Strep Gp G beta hemolytic, Anaerobic -4/21-Light Diphtheroids Previous WC - 3/10- MSSA , Proteus mirabilis Xray - 
: Soft tissue wound at the level of the third through fifth proximal 
metatarsals. Underlying cortical irregularity and lucency within the proximal 
third through fifth metatarsals may represent osteomyelitis. - PARAG on CKD 4 Cr increase to 1.69 
-Atial fibrillation - ESR / CRP 85/14.4  
- Diabetes mellitus with neuropathy A1c 7.5 
- Penicillin allergy - hives PLAN:  
  
 -  Pt will need long course of antibiotic therapy - 6 weeks  . Progressive increase in Cr has been noted on Vancomycin, pt has CKD4 . S/p  change to Daptomycin IV  
  -- Weekly CBC, CMP & ESR/ CRP every other week  Fax reports to [phone], call with abnormal results at [phone]. Call with antibiotic related adverse events - Nephrology Consult Subjective:  
 
Pt seen . Resting , s/p BKA Patient Active Problem List  
Diagnosis Code  History of breast cancer Z85.3  Asthma J45.909  Fe deficiency anemia D50.9  Status post ablation of atrial fibrillation Z98.890, Z86.79  
 Sick sinus syndrome (HCC) I49.5  S/P cardiac pacemaker procedure Z95.0  Long term (current) use of anticoagulants Z79.01  
 Mixed hyperlipidemia E78.2  CKD (chronic kidney disease), stage IV ([address] N18.4  Systolic murmur T29.2  Essential hypertension I10  
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  
 Diabetic ulcer of right midfoot associated with type 2 diabetes mellitus, with fat layer exposed ([address] E11.621, V52.560  Foot deformity, acquired, left M21.962  Foot deformity, right M21.961  Pes cavus Q66.70  Type 2 diabetes mellitus with left diabetic foot infection ([facility]) E11.628, L08.9  Traumatic hematoma of foot, left, initial encounter S83.30EO  Diabetic ulcer of left midfoot with necrosis of muscle ([address] E11.621, V56.628  Left leg cellulitis [address]. [address]  Atrial fibrillation with RVR ([facility]) I48.91  
 H/O bilateral mastectomy Z90.13  Sepsis ([address] A41.9 Past Medical History:  
Diagnosis Date  Acquired lymphedema Right arm  Arrhythmia  Asthma  Atrial fibrillation ([address] Dr. Tejinder Reid and Dr. Viji Holly [facility])  Cancer ([address] bilateral breast  
 Chronic kidney disease  Diabetes mellitus type II   
 Dizziness  Essential hypertension  Hyperlipidemia  Hypertension  Long term (current) use of anticoagulants  Morbid obesity ([facility] 3/14/2012  Nausea & vomiting  Neuropathy  Osteoarthritis  Pacemaker  Sick sinus syndrome ([facility] Family History Problem Relation Age of Onset  Cancer Mother  Heart Disease Mother  Alcohol abuse Father  Diabetes Brother  Hypertension Brother Social History Tobacco Use  Smoking status: Never Smoker  Smokeless tobacco: Never Used Substance Use Topics  Alcohol use: Yes Comment: rare Past Surgical History:  
Procedure Laterality Date  ABDOMEN SURGERY PROC UNLISTED    
 gastric bypass  GASTRIC BYPASS,OBESE<150CM SAW-EN-Y  7/08  
 [place]cher  HX AFIB ABLATION    
 HX BREAST RECONSTRUCTION Bilateral   
 HX CARPAL TUNNEL RELEASE [address] [facility] [address] [facility] [address] [facility] RIGHT MODIFIED RADICAL   
 HX MASTECTOMY Left   
 no lymphnode removal  
 HX MOHS PROCEDURES  1995  
 right  HX ORTHOPAEDIC Right   
 knee surgery  HX PACEMAKER    
 HX PACEMAKER    
 IR INSERT TUNL CVC W PORT OVER 5 YEARS    
 LAMINECTOMY,CERVICAL  1994  
 NEEDLE BIOPSY LIVER,W OTHR [address]  8.21.07  
 NM [address] AND 1994  NM RELOCATION OF SKIN POCKET FOR PACEMAKER N/A 4/24/2020 RELOCATE 2 [facility] performed by Tolu Rothman MD at [address] [facility] CATH LAB  NM RMVL TRANSVNS PM ELTRD 1 LEAD SYS ATR/VENTR N/A 4/24/2020 Remove Pacemaker Dual Leads performed by Tolu Rothman MD at [facility] CATH LAB  NM RMVL TRANSVNS PM ELTRD 1 LEAD SYS ATR/VENTR N/A 4/27/2020 REMOVE PACEMAKER DUAL LEADS performed by Tolu Rothman MD at [address] CATH LAB  NM TRABECULOPLASTY BY LASER SURGERY    
 US GUIDE ASP OVARIAN CYST ABD APPROACH  8.21.07  
 RIGHT Prior to Admission medications Medication Sig Start Date End Date Taking? Authorizing Provider  
metOLazone (ZAROXOLYN) 2.5 mg tablet Take 1 tablet po on Mon and Thurs for worsening leg swelling.  4/13/20 Yes Kia Michelle MD  
warfarin (COUMADIN) 4 mg tablet Take 1 tablet on Tues and Sat and a half tablet the other 5 days. 4/10/20  Yes Kia Michelle MD  
bumetanide (BUMEX) 1 mg tablet Take 2 Tabs by mouth daily. 3/26/20  Yes Kia Michelle MD  
insulin glargine (Lantus U-100 Insulin) 100 unit/mL injection Inject 14 units daily 3/26/20  Yes Kia Michelle MD  
metoprolol succinate (TOPROL-XL) 100 mg tablet Take 2 Tabs by mouth daily. 3/26/20  Yes Kia Michelle MD  
dilTIAZem CD (CARDIZEM CD) 180 mg ER capsule Take 1 Cap by mouth daily. 3/22/20  Yes Gino Allen MD  
hydrALAZINE (APRESOLINE) 50 mg tablet Take 1 Tab by mouth three (3) times daily. 3/22/20  Yes Gino Allen MD  
acetaminophen 500 mg tab 500 mg, diphenhydrAMINE 25 mg cap 25 mg Take 2 Doses by mouth nightly. Yes Provider, Historical  
sertraline (ZOLOFT) 50 mg tablet TAKE ONE AND ONE-HALF (1 & 1/2) TABLET BY MOUTH DAILY 8/22/19  Yes Kia Michelle MD  
doxazosin (CARDURA) 4 mg tablet TAKE ONE TABLET BY MOUTH ONCE NIGHTLY 6/14/19  Yes Kia Michelle MD  
busPIRone (BUSPAR) 10 mg tablet TAKE ONE TABLET BY MOUTH TWICE A DAY 5/24/19  Yes Kia Michelle MD  
cholecalciferol, VITAMIN D3, (VITAMIN D3) 5,000 unit tab tablet Take 5,000 Units by mouth daily. Yes Provider, Historical  
vitamin A 8,000 unit capsule Take 8,000 Units by mouth daily. Yes Provider, Historical  
insulin lispro (HUMALOG) 100 unit/mL kwikpen 2 units with dinner for sugars over 200 1/3/14  Yes Kia Michelle MD  
cyanocobalamin (VITAMIN B-12) 1,000 mcg sublingual tablet Take 1,000 mcg by mouth daily. Yes Provider, Historical  
 
Allergies Allergen Reactions  Ace Inhibitors Cough  Amlodipine Swelling  Avapro [Irbesartan] Unable to Obtain  Bactrim [Sulfamethoxazole-Trimethoprim] Other (comments) Sore mouth  Captopril Cough  Carvedilol Diarrhea  Contrast Agent [Iodine] Itching  Fish Containing Products Hives  Morphine Nausea and Vomiting and Swelling  Penicillins Hives Did not tolerate cefepime 3/2020  Pravachol [Pravastatin] Nausea Only  Seafood [Shellfish Containing Products] Hives  Singulair [Montelukast] Other (comments)  
  palpitations Review of Systems:  A comprehensive review of systems was negative except for that written in the History of Present Illness. 10 point review of systems obtained . All other systems negative Objective:  
Blood pressure 107/45, pulse 71, temperature 97.3 °F (36.3 °C), resp. rate 20, height 5' 10\" (1.778 m), weight 230 lb (104.3 kg), SpO2 100 %, not currently breastfeeding. Temp (24hrs), Av.7 °F (36.5 °C), Min:97.3 °F (36.3 °C), Max:98.1 °F (36.7 °C) Current Facility-Administered Medications Medication Dose Route Frequency  0.9% sodium chloride infusion 250 mL  250 mL IntraVENous PRN  
 fentaNYL citrate (PF) injection 25 mcg  25 mcg IntraVENous Q4H PRN  
 DAPTOmycin (CUBICIN) 900 mg in 0.9% sodium chloride 50 mL IVPB  900 mg IntraVENous Q24H  
 heparin 25,000 units in D5W 250 ml infusion  9-25 Units/kg/hr IntraVENous TITRATE  heparin (porcine) injection 4,000 Units  4,000 Units IntraVENous Q6H PRN  
 heparin (porcine) injection 2,000 Units  2,000 Units IntraVENous Q6H PRN  
 metoprolol succinate (TOPROL-XL) XL tablet 50 mg  50 mg Oral DAILY  oxyCODONE-acetaminophen (PERCOCET) 5-325 mg per tablet 1 Tab  1 Tab Oral Q4H PRN  
 fentaNYL citrate (PF) injection 25-50 mcg  25-50 mcg IntraVENous Multiple  hydrALAZINE (APRESOLINE) 20 mg/mL injection 10 mg  10 mg IntraVENous Q6H PRN  
 dilTIAZem CD (CARDIZEM CD) capsule 180 mg  180 mg Oral DAILY  insulin glargine (LANTUS) injection 10 Units  10 Units SubCUTAneous DAILY  bumetanide (BUMEX) tablet 2 mg  2 mg Oral DAILY  busPIRone (BUSPAR) tablet 10 mg  10 mg Oral BID  doxazosin (CARDURA) tablet 4 mg  4 mg Oral QHS  hydrALAZINE (APRESOLINE) tablet 50 mg  50 mg Oral TID  sertraline (ZOLOFT) tablet 50 mg  50 mg Oral DAILY  glucose chewable tablet 16 g  4 Tab Oral PRN  
 dextrose (D50W) injection syrg 12.5-25 g  25-50 mL IntraVENous PRN  
 glucagon (GLUCAGEN) injection 1 mg  1 mg IntraMUSCular PRN  
 insulin lispro (HUMALOG) injection   SubCUTAneous AC&HS  sodium chloride (NS) flush 5-10 mL  5-10 mL IntraVENous PRN Exam:   
General:  Awake cooperative, Eyes:  Sclera anicteric. Pupils equally round and reactive to light. Mouth/Throat: Mucous membranes normal, oral pharynx clear Neck: Supple Lungs:   Clear to auscultation CV:  Regular rate and rhythm,no murmur, click, rub or gallop Abdomen:   Soft, non-tender. bowel sounds normal. non-distended Extremities: No cyanosis or edema Skin: Skin color, texture, turgor normal. no acute rash or lesions Lymph nodes: Cervical and supraclavicular normal  
Musculoskeletal: LLE swelling, warm, foot dressing + Lines/Devices:  Intact, no erythema, drainage or tenderness Psych: Resting , cannot assess Data Review: CBC:  
Recent Labs 04/30/20 
0207 04/29/20 
1610 WBC 9.8 9.7  
RBC 2.80* 2.96* HGB 8.1* 8.5* HCT 26.0* 26.8*  
* 528* GRANS 74 76* LYMPH 13 9* EOS 1 2 CMP:  
Recent Labs 05/01/20 
9101 04/30/20 
0207 04/29/20 
0448 * 98 84  137 138  
K 3.7 3.3* 3.6  105 108 CO2 25 26 25 BUN 20 19 18 CREA 1.69* 1.47* 1.45* CA 8.6 8.3* 8.6 AGAP 8 6 5 BUCR 12 13 12 AP  --  72  --   
TP  --  6.5  --   
ALB  --  2.2*  --   
GLOB  --  4.3*  --   
AGRAT  --  0.5*  --   
 
 
Lab Results Component Value Date/Time  Culture result: NO GROWTH 6 DAYS 04/21/2020 08:50 AM  
 Culture result: NO ANAEROBES ISOLATED 04/21/2020 07:20 AM  
 Culture result: LIGHT DIPHTHEROIDS (A) 04/21/2020 07:20 AM  
 Culture result: LIGHT STAPHYLOCOCCUS AUREUS (A) 04/20/2020 05:48 PM  
 Culture result: (A) 04/20/2020 05:48 PM  
  FEW STREPTOCOCCI, BETA HEMOLYTIC GROUP G Penicillin and ampicillin are drugs of choice for treatment of beta-hemolytic streptococcal infections. Susceptibility testing of penicillins and beta-lactams approved by the FDA for treatment of beta-hemolytic streptococcal infections need not be performed routinely, because nonsusceptible isolates are extremely rare. CLSI 2012 XR Results (most recent): 
Results from Hospital Encounter encounter on 04/19/20 XR CHEST PORT Narrative Portable chest single view dated 4/20/2020 Comparison chest dated 4/19/2020 History is status post pacemaker change. A single portable AP upright view of the chest was obtained. The patient is 
slightly rotated towards the right. It is difficult to accurately assess the 
size of the cardiac silhouette. There has been interval removal of the cardiac 
pacer device and leads which were present on the left. A metallic wire projects 
over the upper portion of the right hemithorax. There is a suggestion of some 
increased density in the medial aspect of the upper portion of the right 
hemithorax. This may be associated with the brachiocephalic vasculature. A 
follow-up, true frontal examination of the chest may prove useful. Surgical 
clips project over the right lung base. The costophrenic angles are sharp in 
appearance. There is evidence of postsurgical change involving the cervical 
spine. Impression IMPRESSION: 
1. Interval removal of the cardiac pacer device and leads as described above. 2. Presence of a radiopaque wire which projects over the upper portion of the 
right hemithorax. ICD-10-CM ICD-9-CM 1. Ulcer of left foot, unspecified ulcer stage ([address] L97.529 707.15   
2. Osteomyelitis of left foot, unspecified type ([address] M86.9 730.27 3. Cellulitis of left lower extremity L03.116 682.6 4. Disorder of cardiac pacemaker system, subsequent encounter T82. 9XXD V58.89 ELECTROPHYSIOLOGY PROCEDURE  
  996.72 ELECTROPHYSIOLOGY PROCEDURE  
   ELECTROPHYSIOLOGY PROCEDURE  
   ELECTROPHYSIOLOGY PROCEDURE  
   CANCELED: INVASIVE VASCULAR PROCEDURE  
   CANCELED: INVASIVE VASCULAR PROCEDURE Antibiotic History Vancomycin IV I have discussed the diagnosis with the patient and the intended plan as seen in the above orders. I have discussed medication side effects and warnings with the patient as well. Reviewed test results at length with patient Signed By: Navid Escobar MD FACP

## 2020-05-01 NOTE — CONSULTS
Monroe Margi Ulloa [facility]  SKY:[id number]   :1960 Patient seen 
 
ckd 
nela S/p bka D/w nurse and patient Tenzin Chavez, [address] [facility] [address] [place], [address] [facility] Phone - [phone] Fax - [phone] [place] [id number], [address] [facility] Phone - [phone] Fax - [phone]    
[email or web address]

## 2020-05-01 NOTE — PERIOP NOTES
Handoff Report from Operating Room to PACU Report received from Mariza Remy CRNA  and Astrid Choudhary RN  regarding Virginia Morrow. Surgeon(s): 
Trini Hardy MD  And Procedure(s) (LRB): LEFT BELOW KNEE AMPUTATION (Left)  confirmed  
with allergies and dressings discussed. Anesthesia type, drugs, patient history, complications, estimated blood loss, vital signs, intake and output, and last pain medication, lines, reversal medications and temperature were reviewed. Dr. Caroline Oquendo updated family. 2402 TRANSFER - OUT REPORT: 
 
Verbal report given to Jacqueline Sipvey RN (name) on Virginia Morrow  being transferred to [facility] 1568 (unit) for routine post - op Report consisted of patients Situation, Background, Assessment and  
Recommendations(SBAR). Information from the following report(s) SBAR, Kardex, OR Summary, Procedure Summary, Intake/Output, MAR, Recent Results and Cardiac Rhythm NSR was reviewed with the receiving nurse. Lines:  
Peripheral IV 04/30/20 Left Hand (Active) Site Assessment Clean, dry, & intact 5/1/2020 10:09 AM  
Phlebitis Assessment 0 5/1/2020 10:09 AM  
Infiltration Assessment 0 5/1/2020 10:09 AM  
Dressing Status Clean, dry, & intact 5/1/2020 10:09 AM  
Dressing Type Transparent 5/1/2020 10:09 AM  
Hub Color/Line Status Blue 5/1/2020 10:09 AM  
   
Peripheral IV 05/01/20 Anterior;Left;Proximal Forearm (Active) Site Assessment Clean, dry, & intact 5/1/2020 10:09 AM  
Phlebitis Assessment 0 5/1/2020 10:09 AM  
Infiltration Assessment 0 5/1/2020 10:09 AM  
Dressing Status Clean, dry, & intact 5/1/2020 10:09 AM  
Dressing Type Transparent 5/1/2020 10:09 AM  
Hub Color/Line Status Pink 5/1/2020 10:09 AM  
   
Peripheral IV 05/01/20 Right Hand (Active) Site Assessment Clean, dry, & intact 5/1/2020 10:09 AM  
Phlebitis Assessment 0 5/1/2020 10:09 AM  
Infiltration Assessment 0 5/1/2020 10:09 AM  
Dressing Status Clean, dry, & intact 5/1/2020 10:09 AM  
Dressing Type Transparent 5/1/2020 10:09 AM  
Hub Color/Line Status Capped 5/1/2020 10:09 AM  
  
 
Opportunity for questions and clarification was provided. Patient transported with: 
 Monitor O2 @ 2 liters Registered Nurse Tech

## 2020-05-01 NOTE — PERIOP NOTES
TRANSFER - IN REPORT: 
 
Verbal report received from Sharona Almodovar RN(name) on Chirag Zazueta  being received from PCU ROOM 2284(unit) for ordered procedure Report consisted of patients Situation, Background, Assessment and  
Recommendations(SBAR). Information from the following report(s) SBAR, Intake/Output, MAR, Recent Results, Cardiac Rhythm NSR/AFIB and Procedure Verification was reviewed with the receiving nurse. Opportunity for questions and clarification was provided. Assessment completed upon patients arrival to unit and care assumed.

## 2020-05-01 NOTE — ANESTHESIA PREPROCEDURE EVALUATION
Relevant Problems No relevant active problems Anesthetic History PONV Review of Systems / Medical History Patient summary reviewed, nursing notes reviewed and pertinent labs reviewed Pulmonary Asthma Neuro/Psych Within defined limits Cardiovascular Hypertension Valvular problems/murmurs: mitral insufficiency CHF Dysrhythmias : atrial fibrillation Pacemaker (PM-sss) and hyperlipidemia Exercise tolerance: <4 METS Comments: Sepsis secondary to left foot osteomyelitis with positive blood cultures and demonstrated vegetation on pacemaker lead by MIKE on 4/23/20. S/P A-fib ablation (2012) Sick Sinus Syndrome MIKE (4/23/20): ·Normal cavity size, wall thickness, systolic function (ejection fraction normal) and diastolic function. Estimated left ventricular ejection fraction is 55 - 60%. No regional wall motion abnormality noted. Normal left ventricular strain. ·Lambl's excrescences visualized on the valve. ·Mitral valve non-specific thickening. Mild to moderate mitral valve regurgitation is present GI/Hepatic/Renal 
  
 
 
Renal disease: CRI Comments: S/P Gastric bypass CRI, Stage IV Endo/Other Diabetes: well controlled, type 2, using insulin Obesity, arthritis and cancer (breast) Pertinent negatives: No morbid obesity Other Findings Comments: Sepsis Non-pressure chronic ulcer of left foot Left foot osteomyelitis Right arm lymphedema Neck and back surgery Physical Exam 
 
Airway Mallampati: II 
TM Distance: 4 - 6 cm Neck ROM: normal range of motion Mouth opening: Normal 
 
 Cardiovascular Regular rate and rhythm,  S1 and S2 normal,  no murmur, click, rub, or gallop Dental 
 
Dentition: Poor dentition and Upper partial plate Comments: Several missing lower teeth, none loose. Pulmonary Breath sounds clear to auscultation Abdominal 
GI exam deferred Other Findings Anesthetic Plan ASA: 3 Anesthesia type: general 
 
Monitoring Plan: BIS Induction: Intravenous Anesthetic plan and risks discussed with: Patient

## 2020-05-01 NOTE — PROGRESS NOTES
TEE Plan:   
 
> PT/OT recommendations 
> [facility] referral?? 
> SNF pt would like referral sent to [facility] 
> If pt goes home at discharge, DME??? 
 
SW met with pt in regards to discharge planning. Pt stated she would like a referral sent to [facility] rehab in the event she will SNF level of care at discharge and a referral sent to [facility] if inpt is the recommendation. Pt would like to return home at discharge. SW informed her if that was the recommendation, DME needed would be ordered prior to discharge. Referral sent to both facilities for them to follow. CM will continue to follow and assist with additional discharge needs. NINFA Nur Ext [phone]

## 2020-05-02 NOTE — PROGRESS NOTES
Bedside shift change report GIVEN TO Ruy Jack RN. Report included the following information SBAR. SIGNIFICANT CHANGES DURING SHIFT:  Pt stable during night; dressing to L BKA site dry and intact. CONCERNS TO ADDRESS WITH MD:   
 
 
 
 
[address] NURSING NOTE Admission Date 4/19/2020 Admission Diagnosis Sepsis ([facility] [A41.9] Consults IP CONSULT TO HOSPITALIST 
IP CONSULT TO INFECTIOUS DISEASES 
IP CONSULT TO NEPHROLOGY 
IP CONSULT TO NEPHROLOGY 
IP CONSULT TO CARDIOLOGY 
IP CONSULT TO VASCULAR SURGERY Cardiac Monitoring [x] Yes [] No  
  
Purposeful Hourly Rounding [x] Yes   
Rayne Score Total Score: 3 Rayne score 3 or > [] Bed Alarm [] Avasys [] 1:1 sitter [] Patient refused (Signed refusal form in chart) Valdemar Score Valdemar Score: 15 Valdemar score 14 or < [] PMT consult [] Wound Care consult  
 []  Specialty bed  [] Nutrition consult Influenza Vaccine Received Flu Vaccine for Current Season (usually Sept-March): Yes Oxygen needs? [x] Room air Oxygen @  []1L    []2L    []3L   []4L    []5L   []6L via NC Chronic home O2 use? [] Yes [x] No 
Perform O2 challenge test and document in progress note using [employer]) Last bowel movement Last Bowel Movement Date: 04/30/20 Urinary Catheter External Female Catheter 04/20/20-Urine Output (mL): 250 ml LDAs Peripheral IV 04/30/20 Left Hand (Active) Site Assessment Clean, dry, & intact 5/2/2020  4:00 AM  
Phlebitis Assessment 0 5/2/2020  4:00 AM  
Infiltration Assessment 0 5/2/2020  4:00 AM  
Dressing Status Clean, dry, & intact 5/2/2020  4:00 AM  
Dressing Type Tape;Transparent 5/2/2020  4:00 AM  
Hub Color/Line Status Blue;Flushed 5/2/2020  4:00 AM  
   
Peripheral IV 05/01/20 Anterior;Left;Proximal Forearm (Active) Site Assessment Clean, dry, & intact 5/2/2020  4:00 AM  
Phlebitis Assessment 0 5/2/2020  4:00 AM  
Infiltration Assessment 0 5/2/2020  4:00 AM  
Dressing Status Clean, dry, & intact 5/2/2020  4:00 AM  
Dressing Type Tape;Transparent 5/2/2020  4:00 AM  
Hub Color/Line Status Pink;Flushed 5/2/2020  4:00 AM  
   
Peripheral IV 05/01/20 Right Hand (Active) Site Assessment Clean, dry, & intact 5/2/2020  4:00 AM  
Phlebitis Assessment 0 5/2/2020  4:00 AM  
Infiltration Assessment 0 5/2/2020  4:00 AM  
Dressing Status Clean, dry, & intact 5/2/2020  4:00 AM  
Dressing Type Tape;Transparent 5/2/2020  4:00 AM  
Hub Color/Line Status Pink;Flushed 5/2/2020  4:00 AM  
      
External Female Catheter 04/20/20 (Active) Site Assessment Clean, dry, & intact 5/1/2020  2:00 PM  
Repositioned Yes 5/1/2020  2:00 PM  
Perineal Care Yes 4/29/2020 11:03 PM  
Wick Changed No 5/1/2020  2:00 PM  
Suction Canister/Tubing Changed Yes 4/29/2020 11:03 PM  
Urine Output (mL) 250 ml 5/2/2020  5:27 AM  
            
  
Readmission Risk Assessment Tool Score High Risk [address] Total Score 3 Has Seen PCP in Last 6 Months (Yes=3, No=0) 2 . Living with Significant Other. Assisted Living. LTAC. SNF. or  
Rehab  
 3 Patient Length of Stay (>5 days = 3) 9 IP Visits Last 12 Months (1-3=4, 4=9, >4=11) 23 Charlson Comorbidity Score (Age + Comorbid Conditions) Criteria that do not apply:  
 Pt. Coverage (Medicare=5 , Medicaid, or Self-Pay=4) Expected Length of Stay 5d 16h Actual Length of Stay 12

## 2020-05-02 NOTE — PROGRESS NOTES
Problem: Falls - Risk of 
Goal: *Absence of Falls Description: Document Ruby Michaud Fall Risk and appropriate interventions in the flowsheet. Outcome: Progressing Towards Goal 
Note: Fall Risk Interventions: 
Mobility Interventions: Assess mobility with egress test, Bed/chair exit alarm, OT consult for ADLs, Patient to call before getting OOB, PT Consult for assist device competence, Utilize walker, cane, or other assistive device, Strengthening exercises (ROM-active/passive) Medication Interventions: Assess postural VS orthostatic hypotension, Bed/chair exit alarm, Patient to call before getting OOB, Teach patient to arise slowly Elimination Interventions: Bed/chair exit alarm, Call light in reach, Patient to call for help with toileting needs, Stay With Me (per policy), Toilet paper/wipes in reach, Toileting schedule/hourly rounds Problem: Pressure Injury - Risk of 
Goal: *Prevention of pressure injury Description: Document Valdemar Scale and appropriate interventions in the flowsheet. Outcome: Progressing Towards Goal 
Note: Pressure Injury Interventions: 
Sensory Interventions: Assess changes in LOC, Assess need for specialty bed, Chair cushion, Discuss PT/OT consult with provider, Float heels, Keep linens dry and wrinkle-free, Maintain/enhance activity level, Turn and reposition approx. every two hours (pillows and wedges if needed), Pad between skin to skin Moisture Interventions: Absorbent underpads, Apply protective barrier, creams and emollients, Internal/External urinary devices, Moisture barrier, Maintain skin hydration (lotion/cream) Activity Interventions: Assess need for specialty bed, Chair cushion, Increase time out of bed, Pressure redistribution bed/mattress(bed type), PT/OT evaluation Mobility Interventions: Assess need for specialty bed, Chair cushion, Float heels, PT/OT evaluation, Trapeze to reposition, Turn and reposition approx.  every two hours(pillow and wedges) Nutrition Interventions: Document food/fluid/supplement intake, Offer support with meals,snacks and hydration Friction and Shear Interventions: Apply protective barrier, creams and emollients, Feet elevated on foot rest, HOB 30 degrees or less, Transferring/repositioning devices, Minimize layers Problem: Impaired Skin Integrity/Pressure Injury Treatment Goal: *Improvement of Existing Pressure Injury Outcome: Progressing Towards Goal

## 2020-05-02 NOTE — PROGRESS NOTES
Hospitalist Progress Note NAME: Reza South :  1960 MRN:  [id number] I reviewed with Dr. Bryan Orantes about the medical history and the findings on the physical examination. I discussed with Dr. Bryan Orantes the patient's diagnosis and concur with the plan. Interim Hospital Summary: 60 y.o. female whom presented on 2020 with fever due to infected left foot and possible OM of left metatarsals. Pt was discharged after being treated for left leg cellulitis on 3/20/2020. Assessment / Plan: 
Sepsis secondary to left foot cellulitis and OM left metatarsals 3,4,and 5 S/p Left BKA 2020 by Dr. Larisa Elkins - appreciate podiatrist's input; concerned with prolonged IV ABX tmt. Dr. Edsno Marinelli will discuss with Vascular service on either a BKA vs Chopart's amputation of left side CT of lower extremity: Large soft tissue ulcer at the base of the fifth metatarsal. No definite evidence of osteomyelitis Post op care per vascular surgery team  
  Pain management; in and out of Atrial fib with bradycardia and hypotension. Start on Neurontin in hope to control phantom pain 
  Continue with percocet with ultram for breakthrough pain No IV pain medication at this time. Pt is tolerating PO without difficulty. Bacteremia with MSSA 
- continue with ID team's input; duration of the therapy, 6 weeks from  from the surgery date BC ()  MSSA -  - LAC 
  BC ()  MSSA -  - RAC 
  BC ()  NG 
  WC () Light  MSSA,few Strep Gp G beta hemolytic ECHO - possible vegetation on AV Pacemaker present - no swelling , erythema MIKE - mobile vegetation on pacer lead; the lead has been extracted on  IV vanc was changed IV Daptomycin due to worsening of creat Weekly CBC, CMP & ESR/ CRP every other week  Fax reports to [phone], call with abnormal results at [phone]. Call with antibiotic related adverse events Vegetation on pacemaker lead S/p dual lead extraction and device removal 4/27/2020 by Dr. Gildardo Umanzor 
Anemia of chronic disease - INR 1.1 & hgb 8.3 Transfuse PRN if hgb <7.0 Atrial fibrillation Hypercoagulable state secondary to afib HTN 
SSS 
- In and out of A-fib with intermittent bradycardia; will have cardiology to review the case for recommendation; last seen by cardiologist team on 4/29 Continue Cardizem, doxazosin, hydralazine Metoprolol changed to coreg; HR down to 40-50's Resume Coumadin; dose per pharmacy 
  
PARAG on CKD stage IV 
- creat trending up 1.7 was 1.9 on admission Avoid nephrotoxic agents. Appreciate nephrology input 
  
Type 2 DM with hyperglycemia 
- Hgb A1c 7.5. continue with lantus Check qac/qhs blood glucose and follow SSI 
  
Depression and anxiety 
- Continue sertraline and buspirone  
  
30.0 - 39.9 Obese / Body mass index is 34.87 kg/m². Code status: Full Prophylaxis: SCD's Recommended Disposition: Rehab Subjective: Chief Complaint / Reason for Physician Visit \"I really want to go home, I miss my cat. \"  Discussed with RN events overnight. Review of Systems: 
Symptom Y/N Comments  Symptom Y/N Comments Fever/Chills n   Chest Pain n   
Poor Appetite    Edema Cough    Abdominal Pain Sputum    Joint Pain SOB/CHOW n   Pruritis/Rash Nausea/vomit n   Tolerating PT/OT Diarrhea n   Tolerating Diet Constipation n   Other Could NOT obtain due to:   
 
Objective: VITALS:  
Last 24hrs VS reviewed since prior progress note. Most recent are: 
Patient Vitals for the past 24 hrs: 
 Temp Pulse Resp BP SpO2  
05/02/20 0347 98 °F (36.7 °C) 69 20 130/56 100 % 05/01/20 2251 97.8 °F (36.6 °C) 71 18 132/56 100 % 05/01/20 2010 97.8 °F (36.6 °C) 66  126/50 100 % 05/01/20 1850    117/54   
05/01/20 1835  65  99/41 100 % 05/01/20 1808  68  (!) 101/39 100 % 05/01/20 1730  69 20 118/47 100 % 05/01/20 1638  67  120/53   
05/01/20 1501 98 °F (36.7 °C) 69 20 117/50 100 % 05/01/20 1448   20    
05/01/20 1430  70  105/48   
05/01/20 1400  71  107/45   
05/01/20 1330  69  114/44   
05/01/20 1300  71  116/56   
05/01/20 1253  70 14 110/48 100 % 05/01/20 1203  66 14 100/45 100 % 05/01/20 1130 97.3 °F (36.3 °C) 66 14 111/45 100 % 05/01/20 1123  66   100 % 05/01/20 1121  70   100 % 05/01/20 1120  68   100 % 05/01/20 1119  68   100 % 05/01/20 1118  70   100 % 05/01/20 1116  66   100 % 05/01/20 1115  69 14 110/48 100 % 05/01/20 1114  69   100 % 05/01/20 1113  67   100 % 05/01/20 1112  69   100 % 05/01/20 1110  68   100 % 05/01/20 1109  66   100 % 05/01/20 1108  73   100 % 05/01/20 1107  64   100 % 05/01/20 1105  64   100 % 05/01/20 1104  62   100 % 05/01/20 1103  62   100 % 05/01/20 1101  74   100 % 05/01/20 1100  65 14 141/51 99 % 05/01/20 1059  76   99 % 05/01/20 1058  79   100 % 05/01/20 1056  78   100 % 05/01/20 1055  67   99 % 05/01/20 1054  81   100 % 05/01/20 1052  81  130/50 100 % 05/01/20 1050  61   100 % 05/01/20 1049  69   100 % 05/01/20 1048  (!) 59   100 % 05/01/20 1047  61   100 % 05/01/20 1045  70  130/50 100 % 05/01/20 1044  63   100 % 05/01/20 1043  73   100 % 05/01/20 1042  77   100 % 05/01/20 1041  63   100 % 05/01/20 1040  70   100 % 05/01/20 1038  62   100 % 05/01/20 1037  66   100 % 05/01/20 1036 97.3 °F (36.3 °C) 66 12 121/50 100 % 05/01/20 1035  62   100 % 05/01/20 1034  62   100 % 05/01/20 1033  61   100 % 05/01/20 1031  62   100 % 05/01/20 1030    121/50 100 % 05/01/20 1029     99 % 05/01/20 1027  61   99 % 05/01/20 1025  61   99 % 05/01/20 1024  63   100 % 05/01/20 1023  62   99 % 05/01/20 1022  61   100 % 05/01/20 1021  61   100 % 05/01/20 1020  65  121/49 100 % 05/01/20 1019 97.6 °F (36.4 °C) 62 14 121/49 100 % 05/01/20 1018  62   100 % 05/01/20 1016  62   100 % 05/01/20 1012  62   100 % 05/01/20 1011  63   100 % 05/01/20 1010  (!) 56   100 % 05/01/20 1009  61   100 % 05/01/20 1008  60   100 % 05/01/20 1007  60   100 % 05/01/20 1005  60   100 % 05/01/20 1004  69   100 % 05/01/20 1003  71   100 % 05/01/20 1002  69   100 % 05/01/20 1001  (!) 58   100 % 05/01/20 1000 97.6 °F (36.4 °C) (!) 56 16 137/53 100 % 05/01/20 0945 97.9 °F (36.6 °C) 61 (!) 7 120/44 99 % 05/01/20 0930  70 10 122/48 100 % 05/01/20 0915  71 17 127/45 100 % 05/01/20 0905  73 13 126/46 100 % 05/01/20 0900  73 9 128/49 99 % 05/01/20 0855  72 (!) 7 128/51 99 % 05/01/20 0853 97.6 °F (36.4 °C) 77 23 131/48 100 % 05/01/20 0850  77 18 131/48 100 % 05/01/20 0845    113/43  Intake/Output Summary (Last 24 hours) at 5/2/2020 2174 Last data filed at 5/2/2020 3034 Gross per 24 hour Intake 1230 ml Output 1300 ml Net -70 ml PHYSICAL EXAM: 
General: Ill appearing. Alert, cooperative, no acute distress EENT:  EOMI. Anicteric sclerae. MMM Resp:  Clear in apex with decreased breath sounds at bases. no wheezing or rales. No accessory muscle use CV:  Regular  rhythm,  No edema GI:  Soft, Non distended, Non tender. +Bowel sounds Neurologic:  Alert and oriented X 3, normal speech, Psych:   Fair insight. Not anxious nor agitated Skin:  No rashes. No jaundice, left BKA site dressing dry and intact. Discoloration of right lower 1/3 leg to foot; no changes Reviewed most current lab test results and cultures  YES Reviewed most current radiology test results   YES Review and summation of old records today    NO Reviewed patient's current orders and MAR    YES 
PMH/SH reviewed - no change compared to H&P 
________________________________________________________________________ Care Plan discussed with: 
 Comments Patient y Family RN y   
Care Manager Consultant Multidiciplinary team rounds were held today with , nursing, pharmacist and clinical coordinator. Patient's plan of care was discussed; medications were reviewed and discharge planning was addressed. ________________________________________________________________________ Osei Espino NP Procedures: see electronic medical records for all procedures/Xrays and details which were not copied into this note but were reviewed prior to creation of Plan. LABS: 
I reviewed today's most current labs and imaging studies. Pertinent labs include: 
Recent Labs 05/02/20 
3631 04/30/20 
0207 04/29/20 
1610 WBC 14.0* 9.8 9.7 HGB 7.8* 8.1* 8.5* HCT 26.1* 26.0* 26.8*  
* 468* 528* Recent Labs 05/02/20 
7830 05/01/20 
4990 04/30/20 
1070  137 137  
K 3.9 3.7 3.3*  
 104 105 CO2 27 25 26 * 126* 98 BUN 19 20 19 CREA 1.70* 1.69* 1.47* CA 8.7 8.6 8.3*  
MG  --   --  2.0 ALB  --   --  2.2* TBILI  --   --  0.3 SGOT  --   --  12* ALT  --   --  <6* INR 1.1 1.1 1.2* Signed: )Gala Jo, MARCEL

## 2020-05-02 NOTE — PROGRESS NOTES
0700: Bedside shift change report given to Youlanda Cowden, RN (oncoming nurse) by Demarcus Goins RN (offgoing nurse). Report included the following information SBAR.  
 
4462: Spoke with Chester about pt's post surgical pain. Pt rating pain 10/10 with BP in low 100's. Shahida prescribed Ultram for breakthrough pain.

## 2020-05-02 NOTE — PROGRESS NOTES
Problem: Self Care Deficits Care Plan (Adult) Goal: *Acute Goals and Plan of Care (Insert Text) Description: FUNCTIONAL STATUS PRIOR TO ADMISSION: Patient lives with her , but was overall Mod I with basic self-care. She was recently hospitalized due to B foot wounds; now s/p L BKA. She was using a RW for transfers and ambulating short distances. She has a ramp; indicates that she sponge bathes. Her  was taking care of grocery shopping and driving. Patient reports he also assists with vacuuming and heaving cleaning since she has restrictions for using her dominant R UE (s/p history of breast cancer with mastectomy). HOME SUPPORT: Lives with her . Occupational Therapy Goals Initiated 5/2/2020 1. Patient will perform grooming standing at the sink with modified independence within 7 day(s). 2.  Patient will perform bathing with supervision/set-up within 7 day(s). 3.  Patient will perform lower body dressing with supervision/set-up within 7 day(s). 4.  Patient will perform toilet transfers with minimal assistance/contact guard assist within 7 day(s). 5.  Patient will perform all aspects of toileting with minimal assistance/contact guard assist within 7 day(s). 6.  Patient will participate in upper extremity therapeutic exercise/activities with Min cues for 10 minutes within 7 day(s). 7.  Patient will independently utilize energy conservation techniques during ADL within 7 day(s). Outcome: Progressing Towards Goal 
 
OCCUPATIONAL THERAPY EVALUATION Patient: Janusz Lockett (89 y.o. female) Date: 5/2/2020 Primary Diagnosis: Sepsis ([facility] [address] [A41.9] Procedure(s) (LRB): LEFT BELOW KNEE AMPUTATION (Left) 1 Day Post-Op Precautions:     
 
ASSESSMENT Based on the objective data described below, the patient presents with significant deficits in self-care, primarily due to decreased strength, decreased activity tolerance, decreased functional mobility, decreased standing tolerance/balance. She was agreeable to therapy but was apprehensive to attempt to stand today. Patient indicates she does not want to rely on her  for basic self-care and is motivated to improve function. Patient requires Min A for bed mobility, and ranges from Total to Min A for lower body ADL. She will benefit from skilled OT treatment to maximize independence and safety in ADL. Current Level of Function Impacting Discharge (ADLs/self-care): Total A to Min A Functional Outcome Measure: The patient scored 40/100 on the Barthel Index. Other factors to consider for discharge: multiple co-morbidities Patient will benefit from skilled therapy intervention to address the above noted impairments. PLAN : 
Recommendations and Planned Interventions: self care training, functional mobility training, therapeutic exercise, balance training, therapeutic activities, endurance activities, patient education, home safety training, and family training/education Frequency/Duration: Patient will be followed by occupational therapy 5 times a week to address goals. Recommendation for discharge: (in order for the patient to meet his/her long term goals) Therapy 3 hours per day 5-7 days per week This discharge recommendation: A follow-up discussion with the attending provider and/or case management is planned IF patient discharges home will need the following DME:  Recommend rehab, but she will likely need adaptive dressing aids, bedside commode, and shower/tub chair SUBJECTIVE:  
Patient stated Oh no. I don't him to have to do ANY of that for me! OBJECTIVE DATA SUMMARY:  
HISTORY:  
Past Medical History:  
Diagnosis Date Acquired lymphedema Right arm Arrhythmia Asthma Atrial fibrillation ([facility] Dr. So Miles and Dr. Lila Martinez  
 Atrial flutter [facility]) Cancer ([facility] bilateral breast  
 Chronic kidney disease Diabetes mellitus type II   
 Dizziness Essential hypertension Hyperlipidemia Hypertension Long term (current) use of anticoagulants Morbid obesity ([facility] [address] 3/14/2012 Nausea & vomiting Neuropathy Osteoarthritis Pacemaker Sick sinus syndrome ([facility] [address] Past Surgical History:  
Procedure Laterality Date ABDOMEN SURGERY PROC UNLISTED    
 gastric bypass GASTRIC BYPASS,OBESE<150CM SAW-EN-Y  7/08  
 [place] HX AFIB ABLATION    
 HX BREAST RECONSTRUCTION Bilateral   
 HX CARPAL TUNNEL RELEASE    
 HX CERVICAL FUSION  1994 [facility] [address] RIGHT MODIFIED RADICAL   
 HX MASTECTOMY Left   
 no lymphnode removal  
 HX MOHS PROCEDURES  1995  
 right HX ORTHOPAEDIC Right   
 knee surgery HX PACEMAKER    
 HX PACEMAKER    
 IR INSERT TUNL CVC W PORT OVER 5 YEARS    
 [address] NEEDLE BIOPSY LIVER,W OTHR [address]  8.21.07  
 TX [address] AND 1994 TX RELOCATION OF SKIN POCKET FOR PACEMAKER N/A 4/24/2020 RELOCATE [address] performed by Joan Mejia MD at [facility] CARDIAC CATH LAB  
 TX RMVL TRANSVNS PM ELTRD 1 LEAD SYS ATR/VENTR N/A 4/24/2020 Remove Pacemaker Dual Leads performed by Joan Mejia MD at [facility] CARDIAC CATH LAB  
 TX RMVL TRANSVNS PM ELTRD 1 LEAD SYS ATR/VENTR N/A 4/27/2020 REMOVE PACEMAKER DUAL LEADS performed by Joan Mejia MD at [facility] CARDIAC CATH LAB  
 TX TRABECULOPLASTY BY LASER SURGERY    
 US GUIDE ASP OVARIAN CYST ABD APPROACH  8.21.07  
 RIGHT Expanded or extensive additional review of patient history:  
 
Home Situation Home Environment: Private residence # Steps to Enter: 0 Wheelchair Ramp: Yes One/Two Story Residence: One story Living Alone: No 
Support Systems: Spouse/Significant Other/Partner Patient Expects to be Discharged to[de-identified] Private residence Current DME Used/Available at Home: Walker, rolling Tub or Shower Type: Shower(Patient has been sponge bathing) Hand dominance: Right EXAMINATION OF PERFORMANCE DEFICITS: 
Cognitive/Behavioral Status: 
Neurologic State: Alert; Appropriate for age Orientation Level: Oriented X4 Cognition: Appropriate decision making Perception: Appears intact Perseveration: No perseveration noted Safety/Judgement: Fall prevention;Home safetyHearing: 
 
Vision/Perceptual:   
Not formally assessed. Patient wears reading glasses. Range of Motion: 
AROM: Generally decreased, functional 
  
  
  
  
  
  
  
 
Strength: 
Strength: Generally decreased, functional 
  
  
  
  
 
Coordination: 
  
Fine Motor Skills-Upper: Left Intact; Right Intact Gross Motor Skills-Upper: Left Intact; Right Intact Tone & Sensation: 
Tone: Normal 
  
  
  
  
  
  
  
 
Balance: 
Sitting: Intact Functional Mobility and Transfers for ADLs: 
Bed Mobility: 
Supine to Sit: Minimum assistance; Additional time Sit to Supine: Minimum assistance;Assist x1 Scooting: Moderate assistance; Additional time Transfers: 
Sit to Stand: (declined due to pain) Toilet Transfer : (declined attempting at this time due to pain) ADL Assessment: 
Feeding: Independent Oral Facial Hygiene/Grooming: Setup;Supervision(in bed) Bathing: Moderate assistance Upper Body Dressing: Supervision Lower Body Dressing: Maximum assistance Toileting: Total assistance ADL Intervention and task modifications: 
Educated patient of safety and fall prevention. Discussed proper techniques for bed mobility. Initiated ADL training, including potential equipment needs. Patient declined standing today due to pain, despite encouragement. Cognitive Retraining Safety/Judgement: Fall prevention;Home safety Functional Measure: 
Barthel Index: 
 
Bathin Bladder: 5 Bowels: 10 
Groomin Dressin Feeding: 10 Mobility: 0 Stairs: 0 Toilet Use: 0 Transfer (Bed to Chair and Back): 5 Total: 40/100 The Barthel ADL Index: Guidelines 1. The index should be used as a record of what a patient does, not as a record of what a patient could do. 2. The main aim is to establish degree of independence from any help, physical or verbal, however minor and for whatever reason. 3. The need for supervision renders the patient not independent. 4. A patient's performance should be established using the best available evidence. Asking the patient, friends/relatives and nurses are the usual sources, but direct observation and common sense are also important. However direct testing is not needed. 5. Usually the patient's performance over the preceding 24-48 hours is important, but occasionally longer periods will be relevant. 6. Middle categories imply that the patient supplies over 50 per cent of the effort. 7. Use of aids to be independent is allowed. Nicolette Dejesus., Barthel, DLeanneW. (1077). Functional evaluation: the Barthel Index. [address] [facility] (14)2. Shyrl Fraction kaykay LIZY Moulton, Ripley Brittle., Francoise Valverde., [place], [address] [place] [address] (1999). Measuring the change indisability after inpatient rehabilitation; comparison of the responsiveness of the Barthel Index and Functional [place] Measure. Journal of Neurology, Neurosurgery, and Psychiatry, 66(4), 641-385. Brenda Choi, N.J.A, BLAISE Rosa, & Dipti Nance MLeanneA. (2004.) Assessment of post-stroke quality of life in cost-effectiveness studies: The usefulness of the Barthel Index and the EuroQoL-5D. [facility], 13, [phone] Occupational Therapy Evaluation Charge Determination History Examination Decision-Making MEDIUM Complexity : Expanded review of history including physical, cognitive and psychosocial  history  MEDIUM Complexity : 3-5 performance deficits relating to physical, cognitive , or psychosocial skils that result in activity limitations and / or participation restrictions MEDIUM Complexity : Patient may present with comorbidities that affect occupational performnce. Miniml to moderate modification of tasks or assistance (eg, physical or verbal ) with assesment(s) is necessary to enable patient to complete evaluation Based on the above components, the patient evaluation is determined to be of the following complexity level: MEDIUM Pain Ratin/10 Activity Tolerance:  
Fair Please refer to the flowsheet for vital signs taken during this treatment. After treatment patient left in no apparent distress:   
Supine in bed and Call bell within reach COMMUNICATION/EDUCATION:  
The patients plan of care was discussed with: Physical therapist and Registered nurse. Patient/family agree to work toward stated goals and plan of care. This patients plan of care is appropriate for delegation to [facility]. Thank you for this referral. 
Aye Lu OTR/PATRICIA Time Calculation: 25 mins

## 2020-05-02 NOTE — PROGRESS NOTES
Monroe Cedeno YOB: 1960 Assessment & Plan:  
PARAG, Cr stable CKD3 due to DM 
S/p BKA Proteinuria DM2 HTN, controlled Anemia, on WANDY Rec: 
No change today No need for RRT Nephrology f/u p d/c Available as needed over weekend Subjective:  
CC: f/u PARAG, CKD HPI: Renal function stable with Cr 1.7 ROS: no sob/n/v Current Facility-Administered Medications Medication Dose Route Frequency  0.9% sodium chloride infusion 250 mL  250 mL IntraVENous PRN  
 fentaNYL citrate (PF) injection 25 mcg  25 mcg IntraVENous Q4H PRN  
 hydrALAZINE (APRESOLINE) tablet 25 mg  25 mg Oral TID  alcohol 62% (NOZIN) nasal  1 Ampule  1 Ampule Topical Q12H  
 epoetin viet-epbx (RETACRIT) injection 20,000 Units  20,000 Units SubCUTAneous Q MON, WED & FRI  DAPTOmycin (CUBICIN) 900 mg in 0.9% sodium chloride 50 mL IVPB  900 mg IntraVENous Q24H  
 heparin 25,000 units in D5W 250 ml infusion  9-25 Units/kg/hr IntraVENous TITRATE  heparin (porcine) injection 4,000 Units  4,000 Units IntraVENous Q6H PRN  
 heparin (porcine) injection 2,000 Units  2,000 Units IntraVENous Q6H PRN  
 metoprolol succinate (TOPROL-XL) XL tablet 50 mg  50 mg Oral DAILY  oxyCODONE-acetaminophen (PERCOCET) 5-325 mg per tablet 1 Tab  1 Tab Oral Q4H PRN  
 fentaNYL citrate (PF) injection 25-50 mcg  25-50 mcg IntraVENous Multiple  hydrALAZINE (APRESOLINE) 20 mg/mL injection 10 mg  10 mg IntraVENous Q6H PRN  
 dilTIAZem CD (CARDIZEM CD) capsule 180 mg  180 mg Oral DAILY  insulin glargine (LANTUS) injection 10 Units  10 Units SubCUTAneous DAILY  [Held by provider] bumetanide (BUMEX) tablet 2 mg  2 mg Oral DAILY  busPIRone (BUSPAR) tablet 10 mg  10 mg Oral BID  doxazosin (CARDURA) tablet 4 mg  4 mg Oral QHS  sertraline (ZOLOFT) tablet 50 mg  50 mg Oral DAILY  glucose chewable tablet 16 g  4 Tab Oral PRN  
 dextrose (D50W) injection syrg 12.5-25 g  25-50 mL IntraVENous PRN  
 glucagon (GLUCAGEN) injection 1 mg  1 mg IntraMUSCular PRN  
 insulin lispro (HUMALOG) injection   SubCUTAneous AC&HS  sodium chloride (NS) flush 5-10 mL  5-10 mL IntraVENous PRN Objective:  
 
Vitals: 
Blood pressure 134/57, pulse 76, temperature 98.3 °F (36.8 °C), resp. rate 20, height 5' 10\" (1.778 m), weight 103.4 kg (228 lb), SpO2 97 %, not currently breastfeeding. Temp (24hrs), Av.7 °F (36.5 °C), Min:97.3 °F (36.3 °C), Max:98.3 °F (36.8 °C) Intake and Output: 
No intake/output data recorded.  1901 -  0700 In: 1230 [P.O.:480; I.V.:750] Out: 1700 [[id number]] Physical Exam:              
GENERAL ASSESSMENT: NAD 
CHEST: CTA HEART: S1S2 ABDOMEN: Soft,NT 
: Purewick with good output EXTREMITY: min EDEMA, L BKA NEURO: Grossly non focal 
 
 
   
ECG/rhythm: 
 
Data Review No results for input(s): TNIPOC in the last 72 hours. No lab exists for component: ITNL Recent Labs 20 
2032 CPK 20* Recent Labs 20 
0718 20 
1013 20 
0207 20 
1610  137 137  --   
K 3.9 3.7 3.3*  --   
 104 105  --   
CO2 27 25 26  --   
BUN 19 20 19  --   
CREA 1.70* 1.69* 1.47*  --   
* 126* 98  --   
MG  --   --  2.0  --   
CA 8.7 8.6 8.3*  --   
ALB  --   --  2.2*  --   
WBC 14.0*  --  9.8 9.7 HGB 7.8*  --  8.1* 8.5* HCT 26.1*  --  26.0* 26.8*  
*  --  468* 528* Recent Labs 20 
1439 20 
3772 202 20 
1612 20 
1002 20 
4513 INR 1.1 1.1  --   --   --  1.2* PTP 11.5* 11.5*  --   --   --  11.9* APTT  --   --  57.9* 63.3* 49.7* 33.6* Needs: urine analysis, urine sodium, protein and creatinine Lab Results Component Value Date/Time Sodium,urine random 19 2020 01:20 PM  
 Creatinine, urine 125.00 2020 01:20 PM  
 
 
 
 
: Nba Armenta MD 
2020 [facility]: 
[email or web address] [place] office: 
[address], [address] [place], [address] [facility] Phone: [phone] Fax :     [phone] [place] office: 
[address] [facility], [address] Phone - [phone] Fax - [phone]

## 2020-05-02 NOTE — PROGRESS NOTES
0700: Bedside shift change report given to Shaquille White RN (oncoming nurse) by Krystle Hudson RN (offgoing nurse). Report included the following information SBAR and Kardex. 1037: Discussed with Nidhi Davis NP patient's BP's soft in low 90s. Pt asymptomatic. Denies any light-headedness or dizziness. Simran Luna NP will review medications and discontinue hydralyzine. 1200: Continue to encourage and educate patient on the importance of TCDB and using the incentive spirometer every 6 minutes. Pt requires a lot of reinforcement. Educated pt on the importance of IS and TCDB techniques to prevent pna/ards. Pt verbalized understanding. 1300: Patient working with PT/OT. 1400: Paged Nidhi Davis NP as pt's HR coty in the 45s and 46s. Pt asymptomatic and is playing on her phone. Denies any palpitations, chest pain, light-headedness or dizziness. Per Nidhi Davis NP cancel transfer orders and obtain EKG. NP will f/u with cards as well. BP continues to be soft in the high 90s. Will continue to monitor. 1724: Patient continues to complain of pain 10/10, however appears comfortable. No facial grimacing. D/w Simran Luna NP given pt's low SBP and bradycardia, have not been able to administer fentanyl, Pt verbalizes understanding will administer ultram and gabapentin when HR & BP stabilize. Given cold compress for left leg and elevated extremity. Offered repositioning however pt declined at this time. Pt refusing stool softeners despite education on constipation r/t opiates and reducing post-op complication for SBO. Pt understands and states her \"bowel are moving and doesn't need a stool softener\". Continue to offer pt support and will continue to monitor.

## 2020-05-02 NOTE — PROGRESS NOTES
Problem: Falls - Risk of 
Goal: *Absence of Falls Description: Document Ana Cristina Myronmarquis Fall Risk and appropriate interventions in the flowsheet. Outcome: Progressing Towards Goal 
Note: Fall Risk Interventions: 
Mobility Interventions: Assess mobility with egress test, Bed/chair exit alarm, OT consult for ADLs, Patient to call before getting OOB, PT Consult for assist device competence, PT Consult for mobility concerns, Utilize walker, cane, or other assistive device Medication Interventions: Assess postural VS orthostatic hypotension, Bed/chair exit alarm, Patient to call before getting OOB, Teach patient to arise slowly Elimination Interventions: Bed/chair exit alarm, Call light in reach, Patient to call for help with toileting needs, Toilet paper/wipes in reach, Toileting schedule/hourly rounds, Stay With Me (per policy)

## 2020-05-02 NOTE — PROGRESS NOTES
Problem: Mobility Impaired (Adult and Pediatric) Goal: *Acute Goals and Plan of Care (Insert Text) Description: FUNCTIONAL STATUS PRIOR TO ADMISSION: Patient was able to ambulate indep for short household distances with RW, limited by wound on left foot. HOME SUPPORT PRIOR TO ADMISSION: Patient lives with  in a single story home, was able to sponge bathe and dress without assist.   shops for groceries, patient able to do th cooking. Physical Therapy Goals Initiated 5/2/2020 1. Patient will move from supine to sit and sit to supine  in bed with independence within 7 day(s). 2.  Patient will transfer from bed to chair and chair to bed with minimal assistance/contact guard assist using the least restrictive device within 7 day(s). 3.  Patient will perform sit to stand with minimal assistance/contact guard assist within 7 day(s). 4.  Patient will ambulate with minimal assistance/contact guard assist for 10 feet with the least restrictive device within 7 day(s). Outcome: Not Met PHYSICAL THERAPY EVALUATION Patient: Lorenzo Oppenheim (75 y.o. female) Date: 5/2/2020 Primary Diagnosis: Sepsis ([facility] [A41.9] Procedure(s) (LRB): LEFT BELOW KNEE AMPUTATION (Left) 1 Day Post-Op Precautions: falls ASSESSMENT Based on the objective data described below, the patient presents with expected post op pain in left residual limb,  impaired ability to perform bed mobility, and decreased tolerance of activity. Patient declined to come to stand today due to pain. Patient received in bed and agreeable to participate, able to perform quad set, SLR and hip abd add on left with good tolerance, and with  full knee extension. Patient came to sit on edge of bed with min assist and cues for sequencing, sitting balance is intact. Patient able to scoot to [facility] and returned to supine, left with call bell in reach.   Patient is a good candidate for inpatient rehab and is motivated to improve functional independence. Current Level of Function Impacting Discharge (mobility/balance): min assist for bed mobility, transfer and gait not assessed (patient declined) Functional Outcome Measure: The patient scored 40/100 on the Barthel outcome measure which is indicative of significant functional imapirment Other factors to consider for discharge: will require inpatient rehab Patient will benefit from skilled therapy intervention to address the above noted impairments. PLAN : 
Recommendations and Planned Interventions: bed mobility training, transfer training, gait training, therapeutic exercises, patient and family training/education, and therapeutic activities Frequency/Duration: Patient will be followed by physical therapy:  4 times a week to address goals. Recommendation for discharge: (in order for the patient to meet his/her long term goals) Therapy 3 hours per day 5-7 days per week This discharge recommendation: 
Has been made in collaboration with the attending provider and/or case management IF patient discharges home will need the following DME: to be determined (TBD) SUBJECTIVE:  
Patient stated I try to do for myself.  OBJECTIVE DATA SUMMARY:  
HISTORY:   
Past Medical History:  
Diagnosis Date Acquired lymphedema Right arm Arrhythmia Asthma Atrial fibrillation ([facility] Dr. Tejinder Reid and Dr. Win Mariscal  
 Atrial [facility]) Cancer ([facility] bilateral breast  
 Chronic kidney disease Diabetes mellitus type II   
 Dizziness Essential hypertension Hyperlipidemia Hypertension Long term (current) use of anticoagulants Morbid obesity ([facility] 3/14/2012 Nausea & vomiting Neuropathy Osteoarthritis Pacemaker Sick sinus syndrome ([facility] Past Surgical History:  
Procedure Laterality Date ABDOMEN SURGERY PROC UNLISTED    
 gastric bypass GASTRIC BYPASS,OBESE<150CM SAW-EN-Y  7/08  
 guan[place]ito FREED AFIB ABLATION    
 HX BREAST RECONSTRUCTION Bilateral   
 HX CARPAL TUNNEL RELEASE    
 HX CERVICAL FUSION  1994 [address] [facility] [address] RIGHT MODIFIED RADICAL   
 HX MASTECTOMY Left   
 no lymphnode removal  
 HX MOHS PROCEDURES  1995  
 right HX ORTHOPAEDIC Right   
 knee surgery HX PACEMAKER    
 HX PACEMAKER    
 IR INSERT TUNL CVC W PORT OVER 5 YEARS    
 [place] NEEDLE BIOPSY LIVER,W OTHR [address]  8.21.07  
 SC [address] AND 1994 SC RELOCATION OF SKIN POCKET FOR PACEMAKER N/A 4/24/2020 RELOCATE [address] performed by Trevon Pereira MD at [facility]ARD[facility]AC CATH LAB  
 SC RMVL TRANSVNS PM ELTRD 1 LEAD SYS ATR/VENTR N/A 4/24/2020 Remove Pacemaker Dual Leads performed by Trevon Pereira MD at [facility] CARDIAC CATH LAB  
 SC RMVL TRANSVNS PM ELTRD 1 LEAD SYS ATR/VENTR N/A 4/27/2020 REMOVE PACEMAKER DUAL LEADS performed by Trevon Pereira MD at [facility] CARDIAC CATH LAB  
 SC TRABECULOPLASTY BY LASER SURGERY    
 US GUIDE ASP OVARIAN CYST ABD APPROACH  8.21.07  
 RIGHT Personal factors and/or comorbidities impacting plan of care:  
 
Home Situation Home Environment: Private residence # Steps to Enter: 0 Wheelchair Ramp: Yes One/Two Story Residence: One story Living Alone: No 
Support Systems: Spouse/Significant Other/Partner Patient Expects to be Discharged to[de-identified] Private residence Current DME Used/Available at Home: Walker, rolling Tub or Shower Type: Shower(Patient has been sponge bathing) EXAMINATION/PRESENTATION/DECISION MAKING:  
Critical Behavior: 
Neurologic State: Alert, Appropriate for age Orientation Level: Oriented X4 Cognition: Appropriate decision making Safety/Judgement: Fall prevention, Home safety Hearing: Auditory Auditory Impairment: None Range Of Motion: 
AROM: Generally decreased, functional 
  
  
  
  
  
  
  
Strength:   
Strength: Generally decreased, functional 
  
  
 Tone & Sensation:  
Tone: Normal 
  
  
  
  
  
  
  
  
   
Coordination: 
  
Vision:  
  
Functional Mobility: 
Bed Mobility: 
  
Supine to Sit: Minimum assistance; Additional time Sit to Supine: Minimum assistance;Assist x1 Scooting: Moderate assistance; Additional time Transfers: 
Sit to Stand: (declined due to pain) Balance:  
Sitting: Intact Ambulation/Gait Training: 
  
  
  
  
  
  
  
  
  
  
  
  
  
  
  
  
  
  
 Stairs: Therapeutic Exercises:  
Quad set, hip abd add, SLR x 3 reps Functional Measure: 
Barthel Index: 
 
Bathin Bladder: 5 Bowels: 10 
Groomin Dressin Feeding: 10 Mobility: 0 Stairs: 0 Toilet Use: 0 Transfer (Bed to Chair and Back): 5 Total: 40/100 The Barthel ADL Index: Guidelines 1. The index should be used as a record of what a patient does, not as a record of what a patient could do. 2. The main aim is to establish degree of independence from any help, physical or verbal, however minor and for whatever reason. 3. The need for supervision renders the patient not independent. 4. A patient's performance should be established using the best available evidence. Asking the patient, friends/relatives and nurses are the usual sources, but direct observation and common sense are also important. However direct testing is not needed. 5. Usually the patient's performance over the preceding 24-48 hours is important, but occasionally longer periods will be relevant. 6. Middle categories imply that the patient supplies over 50 per cent of the effort. 7. Use of aids to be independent is allowed. Anabelle Tong., Barthel, D.W. (8409). Functional evaluation: the Barthel Index. [address] [facility] (14)2. LIZY Conway, Juan A Smith.Kim.Norbert, [address] [place] [address] Ave ().  Measuring the change indisability after inpatient rehabilitation; comparison of the responsiveness of the Barthel Index and Functional Roby Measure. Journal of Neurology, Neurosurgery, and Psychiatry, 66(4), 526-254. JOÃO Infante, BLAISE Rosa, & Caleb Feuntes M.A. (2004.) Assessment of post-stroke quality of life in cost-effectiveness studies: The usefulness of the Barthel Index and the EuroQoL-5D. [facility], 38, 310-47 Physical Therapy Evaluation Charge Determination History Examination Presentation Decision-Making LOW Complexity : Zero comorbidities / personal factors that will impact the outcome / POC LOW Complexity : 1-2 Standardized tests and measures addressing body structure, function, activity limitation and / or participation in recreation  LOW Complexity : Stable, uncomplicated  LOW Complexity : FOTO score of  Based on the above components, the patient evaluation is determined to be of the following complexity level: LOW Pain Rating: 
 
 
Activity Tolerance:  
Fair Please refer to the flowsheet for vital signs taken during this treatment. After treatment patient left in no apparent distress:  
Supine in bed, Call bell within reach, and Side rails x 3 
 
COMMUNICATION/EDUCATION:  
The patients plan of care was discussed with: Occupational therapist and Registered nurse. Fall prevention education was provided and the patient/caregiver indicated understanding. and Patient/family agree to work toward stated goals and plan of care. Thank you for this referral. 
Tiffanie Friedman, PT Time Calculation: 23 mins

## 2020-05-02 NOTE — PROGRESS NOTES
Pharmacy Daily Dosing of Warfarin Indication: AF 
 
Goal INR: 2-3 PTA Warfarin Dose: 4 mg on Tue, Sat; 2 mg on Mon, Wed, Thu, Fri, Sun. 
 
Concurrent anticoagulants: heparin iv and apixaban DC'ed Concurrent antiplatelet: none Major Interacting Medications Drugs that may increase INR: none Drugs that may decrease INR: none Conditions that may increase/decrease INR (CHF, C. diff, cirrhosis, thyroid disorder, hypoalbuminemia):  
 
Labs: 
Recent Labs 05/02/20 
0928 05/01/20 
7593 04/30/20 
0207 04/29/20 
1610 INR 1.1 1.1 1.2*  --   
HGB 7.8*  --  8.1* 8.5* *  --  468* 528* SGOT  --   --  12*  --   
TBILI  --   --  0.3  --   
ALB  --   --  2.2*  -- Impression/Plan:  
Start Warfarin 5/2. Will order warfarin 5 mg PO x 1 dose. Daily INR 
CBC w/o diff QOD Pharmacy will follow daily and adjust the dose as appropriate. Thanks Yoni Suarez, PHARMD

## 2020-05-03 NOTE — PROGRESS NOTES
05/03/20 1540 Vital Signs Temp 98.3 °F (36.8 °C) Temp Source Oral  
Pulse (Heart Rate) (!) 40 Heart Rate Source Monitor Cardiac Rhythm Sinus [place]; A Fib Resp Rate 16  
O2 Sat (%) 96 % Level of Consciousness Alert /44 MAP (Calculated) 65 MEWS Score 3 MEWs elevated as pt's HR coty/afib. EKG obtained. NP aware. Preemptively placed defibrillator pads on patient. Pt remains asymptomatic. Will continue to monitor.

## 2020-05-03 NOTE — PROGRESS NOTES
Hospitalist Progress Note NAME: Nelsy Rodriguez :  1960 MRN:  [id number] I reviewed with Dr. Ana Paula Vora about the medical history and the findings on the physical examination. I discussed with Dr. Ana Paula Vora the patient's diagnosis and concur with the plan. Interim Hospital Summary: 60 y.o. female whom presented on 2020 with fever due to infected left foot and possible OM of left metatarsals. Pt was discharged after being treated for left leg cellulitis on 3/20/2020. Assessment / Plan: 
Since the removal of pacer lead extraction on , pt has been experiencing rapid a-fib to transient bradycardia without pause. Discussed this concerns with Dr. Gal Miles today. Was recommend to start on IV Cardizem for rate control but she has soft blood pressure. During all this event, the pt is asymptomatic. BB has been d/c'd. Last dose given on . 12 lead EKG revealed A-fib with rate of 47. /44. No intervention at this time. We will re consult cardiology team tomorrow. Sepsis secondary to left foot cellulitis and OM left metatarsals 3,4,and 5 S/p Left BKA 2020 by Dr. Marlyn Alicea - appreciate podiatrist's input; concerned with prolonged IV ABX tmt. Dr. Gerardo Yepez will discuss with Vascular service on either a BKA vs Chopart's amputation of left side CT of lower extremity: Large soft tissue ulcer at the base of the fifth metatarsal. No definite evidence of osteomyelitis Post op care per vascular surgery team  
  Pain management; in and out of Atrial fib with bradycardia and hypotension. Start on Neurontin in hope to control phantom pain 
  Continue with percocet with ultram for breakthrough pain No IV pain medication at this time. Pt is tolerating PO without difficulty. Bacteremia with MSSA 
- continue with ID team's input; duration of the therapy, 6 weeks from  from the surgery date   BC ()  MSSA - 1/1 - LAC 
  BC ()  MSSA - 1/1 - RAC 
  BC ()  NG 
  WC () Light  MSSA,few Strep Gp G beta hemolytic ECHO - possible vegetation on AV Pacemaker present - no swelling , erythema MIKE - mobile vegetation on pacer lead; the lead has been extracted on 4/27 IV vanc was changed IV Daptomycin due to worsening of creat Weekly CBC, CMP & ESR/ CRP every other week  Fax reports to [phone], call with abnormal results at [phone]. Call with antibiotic related adverse events Vegetation on pacemaker lead S/p dual lead extraction and device removal 4/27/2020 by Dr. Lila Martinez 
Anemia of chronic disease - INR 1.2 & hgb 7.4 Transfuse PRN if hgb <7.0 Atrial fibrillation Hypercoagulable state secondary to afib HTN 
SSS 
- In and out of A-fib with intermittent bradycardia; will have cardiology to review the case for recommendation; last seen by cardiologist team on 4/29 Continue Cardizem, doxazosin, hydralazine HR down to 40-50's at times, BB has been d/c'd 
   Coumadin per pharmacy; INR 1.2 
  
PARAG on CKD stage IV 
- creat trending up 1.8 Start on gentle IV hydration Avoid nephrotoxic agents. Appreciate nephrology input 
  
Type 2 DM with hyperglycemia 
- Hgb A1c 7.5. continue with lantus Check qac/qhs blood glucose and follow SSI 
  
Depression and anxiety 
- Continue sertraline and buspirone  
  
30.0 - 39.9 Obese / Body mass index is 34.87 kg/m². Code status: Full Prophylaxis: SCD's Recommended Disposition: Rehab Subjective: Chief Complaint / Reason for Physician Visit \"pain is ok. \"  Discussed with RN events overnight. Review of Systems: 
Symptom Y/N Comments  Symptom Y/N Comments Fever/Chills n   Chest Pain n   
Poor Appetite    Edema Cough    Abdominal Pain Sputum    Joint Pain SOB/CHOW n   Pruritis/Rash Nausea/vomit n   Tolerating PT/OT Diarrhea n   Tolerating Diet Constipation n   Other Could NOT obtain due to:   
 
Objective: VITALS:  
Last 24hrs VS reviewed since prior progress note. Most recent are: 
Patient Vitals for the past 24 hrs: 
 Temp Pulse Resp BP SpO2  
05/03/20 0727 98.1 °F (36.7 °C) 66 16 120/85 96 % 05/03/20 0539 97.7 °F (36.5 °C) 64 12 124/60 97 % 05/03/20 0021 98.1 °F (36.7 °C) 61 12 112/47 92 % 05/02/20 2207  62  126/59   
05/02/20 1930 98.6 °F (37 °C) 66 14 116/54 96 % 05/02/20 1814  65  112/49   
05/02/20 1731  62  109/56   
05/02/20 1725  65  116/52   
05/02/20 1633  (!) 56     
05/02/20 1614  62  112/48   
05/02/20 1537 97.6 °F (36.4 °C) (!) 55 16 105/51 97 % 05/02/20 1413  (!) 49     
05/02/20 1407 98 °F (36.7 °C) (!) 54 16 99/50 99 % 05/02/20 1307  (!) 55  103/52   
05/02/20 1256    104/43   
05/02/20 1209  62  123/54 98 % 05/02/20 1201  67  118/53   
05/02/20 1130  (!) 58  109/44 97 % 05/02/20 1035 97.8 °F (36.6 °C) 70 16 104/44 98 % 05/02/20 1032  68  (!) 91/37   
05/02/20 1017    97/44   
05/02/20 1004  60  100/42   
05/02/20 0947    104/43   
05/02/20 0838  74  122/51  Intake/Output Summary (Last 24 hours) at 5/3/2020 7265 Last data filed at 5/3/2020 0021 Gross per 24 hour Intake 1540 ml Output 500 ml Net 1040 ml PHYSICAL EXAM: 
General: Ill appearing. Alert, cooperative, no acute distress EENT:  EOMI. Anicteric sclerae. MMM Resp:  Clear in apex with decreased breath sounds at bases. no wheezing or rales. No accessory muscle use CV:  Regular  rhythm,  No edema GI:  Soft, Non distended, Non tender. +Bowel sounds Neurologic:  Alert and oriented X 3, normal speech, Psych:   Fair insight. Not anxious nor agitated Skin:  No rashes. No jaundice, left BKA site dressing dry and intact. Discoloration of right lower 1/3 leg to foot; no changes Reviewed most current lab test results and cultures  YES Reviewed most current radiology test results   YES Review and summation of old records today    NO Reviewed patient's current orders and [facility] YES 
PMH/SH reviewed - no change compared to H&P 
________________________________________________________________________ Care Plan discussed with: 
  Comments Patient y Family RN y   
Care Manager Consultant Multidiciplinary team rounds were held today with , nursing, pharmacist and clinical coordinator. Patient's plan of care was discussed; medications were reviewed and discharge planning was addressed. ________________________________________________________________________ Osei Carpio NP Procedures: see electronic medical records for all procedures/Xrays and details which were not copied into this note but were reviewed prior to creation of Plan. LABS: 
I reviewed today's most current labs and imaging studies. Pertinent labs include: 
Recent Labs 05/03/20 
6383 05/02/20 
8008 WBC 8.5 14.0* HGB 7.4* 7.8* HCT 25.2* 26.1*  
* 510* Recent Labs 05/03/20 
7511 05/02/20 
5025 05/01/20 
3158 * 137 137  
K 3.9 3.9 3.7  105 104 CO2 27 27 25 GLU 94 166* 126* BUN 22* 19 20 CREA 1.87* 1.70* 1.69* CA 8.7 8.7 8.6 INR 1.2* 1.1 1.1 Signed: )Paul Sandra NP

## 2020-05-03 NOTE — PROGRESS NOTES
Pharmacy Daily Dosing of Warfarin Indication: AFib Goal INR: 2-3 PTA Warfarin Dose: 4 mg on Tue, Sat; 2 mg on Mon, Wed, Thu, Fri, Sun. 
 
Concurrent anticoagulants: None Concurrent antiplatelet: none Major Interacting Medications Drugs that may increase INR: none Drugs that may decrease INR: none Conditions that may increase/decrease INR (CHF, C. diff, cirrhosis, thyroid disorder, hypoalbuminemia): None Labs: 
Recent Labs 05/03/20 
2755 05/02/20 
1022  05/01/20 
2317 INR 1.2* 1.1  --  1.1 HGB 7.4* 7.8*   < >  --   
* 510*  --   --   
 < > = values in this interval not displayed. Impression/Plan:  
Will order warfarin 5 mg PO x 1 dose. Daily INR 
CBC w/o diff every other day Pharmacy will follow daily and adjust the dose as appropriate. Thanks Trina Bull, PHARMD 
 
 
[email or web address]/[place]/[facility]/Pharmacy/Clinical%20Companion/Warfarin%20Dosing%20Protocol. pdf

## 2020-05-03 NOTE — PROGRESS NOTES
Problem: Falls - Risk of 
Goal: *Absence of Falls Description: Document Ana Cristina Suh Fall Risk and appropriate interventions in the flowsheet. Outcome: Progressing Towards Goal 
Note: Fall Risk Interventions: 
Mobility Interventions: OT consult for ADLs, Patient to call before getting OOB, PT Consult for mobility concerns, PT Consult for assist device competence, Strengthening exercises (ROM-active/passive), Utilize walker, cane, or other assistive device Medication Interventions: Evaluate medications/consider consulting pharmacy Elimination Interventions: Bed/chair exit alarm, Call light in reach, Patient to call for help with toileting needs, Toileting schedule/hourly rounds Problem: Pressure Injury - Risk of 
Goal: *Prevention of pressure injury Description: Document Valdemar Scale and appropriate interventions in the flowsheet. Outcome: Progressing Towards Goal 
Note: Pressure Injury Interventions: 
Sensory Interventions: Assess changes in LOC, Assess need for specialty bed, Chair cushion, Discuss PT/OT consult with provider, Float heels, Keep linens dry and wrinkle-free, Maintain/enhance activity level, Turn and reposition approx. every two hours (pillows and wedges if needed), Pad between skin to skin Moisture Interventions: Absorbent underpads, Internal/External urinary devices, Minimize layers Activity Interventions: Chair cushion, Increase time out of bed, Pressure redistribution bed/mattress(bed type), PT/OT evaluation Mobility Interventions: Assess need for specialty bed, Chair cushion, Float heels, HOB 30 degrees or less, Pressure redistribution bed/mattress (bed type), PT/OT evaluation Nutrition Interventions: Document food/fluid/supplement intake Friction and Shear Interventions: Lift sheet, Minimize layers Problem: Impaired Skin Integrity/Pressure Injury Treatment Goal: *Improvement of Existing Pressure Injury Outcome: Progressing Towards Goal

## 2020-05-03 NOTE — PROGRESS NOTES
0700: Bedside shift change report given to Diego Barrera RN (oncoming nurse) by Ana Maria Hoang RN (offgoing nurse). Report included the following information SBAR.

## 2020-05-03 NOTE — PROGRESS NOTES
No c/o Some BP and HR issues Pain controlled Stump dry BUN/creat trending up Sat up with PT Rehab consult Monday

## 2020-05-03 NOTE — PROGRESS NOTES
0700: Bedside shift change report given to Jo Ann Andrade RN (oncoming nurse) by Lisa Zhou RN (offgoing nurse). Report included the following information SBAR and Kardex. 3980: Dr. Nicole Shine at the bedside assessing patient. Dr. Nicole Shine will change dressing to the left BKA tomorrow morning. 1400: Patient's HR coty in the 40s as low as 41, pt asymptomatic. Pt denies any slow or irregular heart beat, no sob, no light-headedness or dizziness. Notified Cassie Bergeron NP who is aware and told to continue to monitor pt and notify NP if bradycardia continues. Pt's BP 97/49. Pt appears comfortable. Will continue to monitor. 1510: Paged Cassie Bergeron NP as pt's HR anywhere from 38-40s. Pt's sole complaint at this time includes left leg pain and fatigue. Pt has complained of fatigue since early this morning. Pt's BP in the high 90s-low 100. Per Cassie Bergeron NP will discontinue patient's betablocker. Dr. Combs Stephanie with Cardiology aware of bradycardia per Cassie Bergeron NP.  
 
1708: Discussed pt's bradycardia/afib with Cassie Bergeron NP and Colleen RN (rapid response nurse). Will discontinue PO diltiazem and repeat CBC, BMP and Magnesium.

## 2020-05-03 NOTE — PROGRESS NOTES
Problem: Falls - Risk of 
Goal: *Absence of Falls Description: Document Renzo Matan Fall Risk and appropriate interventions in the flowsheet. Outcome: Progressing Towards Goal 
Note: Fall Risk Interventions: 
Mobility Interventions: Assess mobility with egress test, Bed/chair exit alarm, OT consult for ADLs, Patient to call before getting OOB, Strengthening exercises (ROM-active/passive), Utilize walker, cane, or other assistive device Medication Interventions: Assess postural VS orthostatic hypotension, Bed/chair exit alarm, Patient to call before getting OOB, Teach patient to arise slowly Elimination Interventions: Bed/chair exit alarm, Call light in reach, Patient to call for help with toileting needs, Stay With Me (per policy), Toilet paper/wipes in reach, Toileting schedule/hourly rounds Problem: Pressure Injury - Risk of 
Goal: *Prevention of pressure injury Description: Document Valdemar Scale and appropriate interventions in the flowsheet. Outcome: Progressing Towards Goal 
Note: Pressure Injury Interventions: 
Sensory Interventions: Assess changes in LOC, Assess need for specialty bed, Check visual cues for pain, Chair cushion, Float heels, Keep linens dry and wrinkle-free, Maintain/enhance activity level, Pad between skin to skin, Turn and reposition approx. every two hours (pillows and wedges if needed) Moisture Interventions: Absorbent underpads, Apply protective barrier, creams and emollients, Assess need for specialty bed, Internal/External urinary devices, Maintain skin hydration (lotion/cream), Moisture barrier Activity Interventions: Assess need for specialty bed, Chair cushion, Increase time out of bed, PT/OT evaluation Mobility Interventions: Assess need for specialty bed, Chair cushion, Float heels, HOB 30 degrees or less, Trapeze to reposition, PT/OT evaluation Nutrition Interventions: Document food/fluid/supplement intake Friction and Shear Interventions: Apply protective barrier, creams and emollients, Feet elevated on foot rest, HOB 30 degrees or less, Transfer aides:transfer board/Estefania lift/ceiling lift, Transferring/repositioning devices

## 2020-05-04 NOTE — PROGRESS NOTES
{[facility] BEDSIDE_VERBAL_RECORDED_WRITTEN:90227::\"Bedside\"} shift change report GIVEN TO ***, RN. Report included the following information {SBAR REPORTS TRH}.

## 2020-05-04 NOTE — PROGRESS NOTES
TEE: Inpatient Rehab 
 
2:34pm- [facility] with [facility] called CM via phone about referral. Jos[facility] stated that she needs to know if pt and pt's  will be able to handle the six week IV antibiotic once she is discharged from [facility]. [facility] stated that pt refused PT and that pt needs to participate in [facility] [place] also stated that pt will need insurance auth but [facility] is still following pt.  
 
12:15pm- CM called [facility] with [facility] via phone to follow-up on referral. No answer. CM left a voicemail message. CM will continue to follow patient for discharge planning needs and arrange for services as deemed necessary. Griselda Hazard, [place] [address] [place] [address] 
[phone]

## 2020-05-04 NOTE — OP NOTES
[address] 
OPERATIVE REPORT Name:  Johanna Eddy 
MR#:  [id number] :  [id number] ACCOUNT #:  [de-identified] DATE OF SERVICE:  2020 PREOPERATIVE DIAGNOSIS:  Osteomyelitis, left foot. POSTOPERATIVE DIAGNOSIS:  Osteomyelitis, left foot. PROCEDURE PERFORMED:  Left below-knee amputation. SURGEON:  Shilpi South MD 
 
ANESTHESIA:  General. 
 
SPECIMENS REMOVED:  As above. ESTIMATED BLOOD LOSS:  Minimal. 
 
INDICATIONS:  The patient is a 42-year-old diabetic with a longstanding left foot wound and chronic osteomyelitis, which has been refractory to a year's worth of wound care, antibiotics, and debridement. She has been advised by her foot and ankle surgeon to have a below-knee amputation. I agree with this assessment. The patient has agreed to proceed. PROCEDURE:  After obtaining informed consent, the patient was placed supine on the operating table after adequate induction of general anesthesia, site and patient confirmation, confirmation of ongoing antibiotics for infection, the left leg was prepped and draped in sterile fashion. An incision was made as far down the leg as possible just above the chronic skin changes consistent with her venous stasis. Long posterior based flap was fashioned. Anterior compartment soft tissue was divided with the Bovie electrocautery. Periosteum of the femur was elevated. Femur was divided with the oscillating saw as was the fibula. Posterior soft tissue was divided with amputation knife and specimen was delivered. Bleeding points were controlled with clamps, suture ligatures, chromic ties and Bovie electrocautery. The anterior aspect of the tibia was bevelled both with the oscillating saw and a rasp. The wound was copiously irrigated with antibiotic solution, deemed hemostatic and posterior flap brought forward and closed at the fascial level with interrupted vicryl sutures. Skin was closed with skin staples. Tierra Downey MD 
 
 
[id number] 
D:  05/04/2020 9:21 T:  05/04/2020 11:26 
JOB #:  [id number] CC:  MD Meche Bloom MD

## 2020-05-04 NOTE — PROGRESS NOTES
Physical therapy: Attempted PT session. Pt declining therapy at this time as she is awaiting catheter placement and reported not being in a good mood being NPO. Encouraged pt to mobilize even just to EOB, but pt continued to decline. Will defer and continue to follow. Encouraged pt to continue to sit upright in bed, sit EOB for meals, and perform UE and LE therex as tolerated.  
 
Braden Lee, PT, DPT

## 2020-05-04 NOTE — PROGRESS NOTES
rn spoke to Gómez Barrios in IR, who stated that contrast is not used during Simone catheter placement and pt doesn't require premedication despite contrast allergy. Request received for pt to be NPO and not receive blood thinner.

## 2020-05-04 NOTE — PROGRESS NOTES
Infectious Disease Progress IMPRESSION:  
 
 
- MSSA Bacteremia & R/sided endocarditis 
  with pacemaker lead infection BC - 4/19-  MSSA - 1/1 - LAC BC-  4/19-  MSSA - 1/1 - RAC BC-  4/21-  NG 
  -S/p removal of dual lead Pacemaker on 4/27 MIKE - mobile vegetation on pacer lead Initial lead extraction attempted unsuccessful on 4/24 
   - Cellulitis of LLE , recurrent, s/p BKA today ( 5/1) Previous admissions for same - 3/8-3/22, 1/14-1/20 RLE cellulitis - 12/6-12/9/19 
 - Diabetic foot ulcer , OM of foot plan for BKA in am 
  CT L/foot - no definite evidence of OM, D/w Dr Nicky Garduno, gross pus ++ in wound WC - 4/20- Light  MSSA,few Strep Gp G beta hemolytic, Anaerobic -4/21-Light Diphtheroids Previous WC - 3/10- MSSA , Proteus mirabilis Xray - 
: Soft tissue wound at the level of the third through fifth proximal 
metatarsals. Underlying cortical irregularity and lucency within the proximal 
third through fifth metatarsals may represent osteomyelitis. - PARAG on CKD 4 Cr increase to 1.69 
-Atial fibrillation - ESR / CRP 85/14.4  
- Poor dentition 
- Diabetes mellitus with neuropathy A1c 7.5 
- Penicillin allergy - hives PLAN:  
  
 -  Daptomycin 900 mg IV daily end date 6/11, remove line at end of therapy 
  -- Weekly CBC, CMP, CK  & ESR/ CRP every other week  Fax reports to [phone], call with abnormal results at [phone]. Call with antibiotic related adverse events. - Encourage probiotic, yogurt 
   -Out pt ID follow up [facility] on 5/19 at 3 pm 
  - Blood sugar control - Plan of care D/w pt Subjective:  
 
Pt seen . Awake , waiting to get IV access placement Patient Active Problem List  
Diagnosis Code  History of breast cancer Z85.3  Asthma J45.909  Fe deficiency anemia D50.9  Status post ablation of atrial fibrillation Z98.890, Z86.79  
 Sick sinus syndrome (HCC) I49.5  S/P cardiac pacemaker procedure Z95.0  Long term (current) use of anticoagulants Z79.01  
 Mixed hyperlipidemia E78.2  CKD (chronic kidney disease), stage IV ([facility]) N18.4  Systolic murmur G68.8  Essential hypertension I10  
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  
 Diabetic ulcer of right midfoot associated with type 2 diabetes mellitus, with fat layer exposed ([address] E11.621, I69.166  Foot deformity, acquired, left M21.962  Foot deformity, right M21.961  Pes cavus Q66.70  Type 2 diabetes mellitus with left diabetic foot infection ([facility]) E11.628, L08.9  Traumatic hematoma of foot, left, initial encounter U31.43SF  Diabetic ulcer of left midfoot with necrosis of muscle ([address] E11.621, W43.933  Left leg cellulitis [address]  Atrial fibrillation with RVR ([facility]) I48.91  
 H/O bilateral mastectomy Z90.13  Sepsis ([address] A41.9 Past Medical History:  
Diagnosis Date  Acquired lymphedema Right arm  Arrhythmia  Asthma  Atrial fibrillation ([address] Dr. Miguel Simeon and Dr. [facility])  Cancer ([address] bilateral breast  
 Chronic kidney disease  Diabetes mellitus type II   
 Dizziness  Essential hypertension  Hyperlipidemia  Hypertension  Long term (current) use of anticoagulants  Morbid obesity ([facility] 3/14/2012  Nausea & vomiting  Neuropathy  Osteoarthritis  Pacemaker  Sick sinus syndrome ([facility] Family History Problem Relation Age of Onset  Cancer Mother  Heart Disease Mother  Alcohol abuse Father  Diabetes Brother  Hypertension Brother Social History Tobacco Use  Smoking status: Never Smoker  Smokeless tobacco: Never Used Substance Use Topics  Alcohol use: Yes Comment: rare Past Surgical History:  
Procedure Laterality Date  ABDOMEN SURGERY PROC UNLISTED    
 gastric bypass  GASTRIC BYPASS,OBESE<150CM SAW-EN-Y  7/08  
 hutcher  HX AFIB ABLATION    
 HX BREAST RECONSTRUCTION Bilateral   
 HX CARPAL TUNNEL RELEASE [address] [facility] [address] [facility] [address] [facility] RIGHT MODIFIED RADICAL   
 HX MASTECTOMY Left   
 no lymphnode removal  
 HX MOHS PROCEDURES  1995  
 right  HX ORTHOPAEDIC Right   
 knee surgery  HX PACEMAKER    
 HX PACEMAKER    
 IR INSERT TUNL CVC W PORT OVER 5 YEARS    
 LAMINECTOMY,CERVICAL  1994  
 NEEDLE BIOPSY LIVER,W OTHR [address]  8.21.07  
 MN [address] AND 1994  MN RELOCATION OF SKIN POCKET FOR PACEMAKER N/A 4/24/2020 RELOCATE 2 [facility] performed by Corina Lester MD at [address] [facility] CATH LAB  MN RMVL TRANSVNS PM ELTRD 1 LEAD SYS ATR/VENTR N/A 4/24/2020 Remove Pacemaker Dual Leads performed by Corina Lester MD at [facility] CARDI[facility] CATH LAB  MN RMVL TRANSVNS PM ELTRD 1 LEAD SYS ATR/VENTR N/A 4/27/2020 REMOVE PACEMAKER DUAL LEADS performed by Corina Lester MD at [address] [facility] CATH LAB  MN TRABECULOPLASTY BY LASER SURGERY    
 US GUIDE ASP OVARIAN CYST ABD APPROACH  8.21.07  
 RIGHT Prior to Admission medications Medication Sig Start Date End Date Taking?  Authorizing Provider  
metOLazone (ZAROXOLYN) 2.5 mg tablet Take 1 tablet po on Mon and Thurs for worsening leg swelling. 4/13/20  Yes Shayy Benjamin MD  
warfarin (COUMADIN) 4 mg tablet Take 1 tablet on Tues and Sat and a half tablet the other 5 days. 4/10/20  Yes Shayy Benjamin MD  
bumetanide (BUMEX) 1 mg tablet Take 2 Tabs by mouth daily. 3/26/20  Yes Shayy Benjamin MD  
insulin glargine (Lantus U-100 Insulin) 100 unit/mL injection Inject 14 units daily 3/26/20  Yes Shayy Benjamin MD  
metoprolol succinate (TOPROL-XL) 100 mg tablet Take 2 Tabs by mouth daily. 3/26/20  Yes Shayy Benjamin MD  
dilTIAZem CD (CARDIZEM CD) 180 mg ER capsule Take 1 Cap by mouth daily. 3/22/20  Yes Smita Pierre MD  
hydrALAZINE (APRESOLINE) 50 mg tablet Take 1 Tab by mouth three (3) times daily. 3/22/20  Yes Smita Pierre MD  
acetaminophen 500 mg tab 500 mg, diphenhydrAMINE 25 mg cap 25 mg Take 2 Doses by mouth nightly. Yes Provider, Historical  
sertraline (ZOLOFT) 50 mg tablet TAKE ONE AND ONE-HALF (1 & 1/2) TABLET BY MOUTH DAILY 8/22/19  Yes Shayy Benjamin MD  
doxazosin (CARDURA) 4 mg tablet TAKE ONE TABLET BY MOUTH ONCE NIGHTLY 6/14/19  Yes Shayy Benjamin MD  
busPIRone (BUSPAR) 10 mg tablet TAKE ONE TABLET BY MOUTH TWICE A DAY 5/24/19  Yes Shayy Benjamin MD  
cholecalciferol, VITAMIN D3, (VITAMIN D3) 5,000 unit tab tablet Take 5,000 Units by mouth daily. Yes Provider, Historical  
vitamin A 8,000 unit capsule Take 8,000 Units by mouth daily. Yes Provider, Historical  
insulin lispro (HUMALOG) 100 unit/mL kwikpen 2 units with dinner for sugars over 200 1/3/14  Yes Shayy Benjamin MD  
cyanocobalamin (VITAMIN B-12) 1,000 mcg sublingual tablet Take 1,000 mcg by mouth daily. Yes Provider, Historical  
 
Allergies Allergen Reactions  Ace Inhibitors Cough  Amlodipine Swelling  Avapro [Irbesartan] Unable to Obtain  Bactrim [Sulfamethoxazole-Trimethoprim] Other (comments) Sore mouth  Captopril Cough  Carvedilol Diarrhea  Contrast Agent [Iodine] Itching [address]  Morphine Nausea and Vomiting and Swelling  Penicillins Hives Did not tolerate cefepime 3/2020  Pravachol [Pravastatin] Nausea Only  Seafood [Shellfish Containing Products] Hives  Singulair [Montelukast] Other (comments)  
  palpitations Review of Systems:  A comprehensive review of systems was negative except for that written in the History of Present Illness. 10 point review of systems obtained . All other systems negative Objective:  
Blood pressure 129/51, pulse 61, temperature 98.2 °F (36.8 °C), resp. rate 18, height 5' 10\" (1.778 m), weight 241 lb 12.8 oz (109.7 kg), SpO2 93 %, not currently breastfeeding. Temp (24hrs), Av.4 °F (36.9 °C), Min:98.2 °F (36.8 °C), Max:98.6 °F (37 °C) Current Facility-Administered Medications Medication Dose Route Frequency  0.9% sodium chloride infusion  50 mL/hr IntraVENous CONTINUOUS  
 0.9% sodium chloride infusion 250 mL  250 mL IntraVENous PRN  
 WARFARIN INFORMATION NOTE (COUMADIN)   Other QPM  
 traMADoL (ULTRAM) tablet 50 mg  50 mg Oral Q6H PRN  
 gabapentin (NEURONTIN) capsule 100 mg  100 mg Oral TID  polyethylene glycol (MIRALAX) packet 17 g  17 g Oral BID  docusate sodium (COLACE) capsule 100 mg  100 mg Oral BID  alcohol 62% (NOZIN) nasal  1 Ampule  1 Ampule Topical Q12H  
 epoetin viet-epbx (RETACRIT) injection 20,000 Units  20,000 Units SubCUTAneous Q MON, WED & FRI  DAPTOmycin (CUBICIN) 900 mg in 0.9% sodium chloride 50 mL IVPB  900 mg IntraVENous Q24H  
 oxyCODONE-acetaminophen (PERCOCET) 5-325 mg per tablet 1 Tab  1 Tab Oral Q4H PRN  
 hydrALAZINE (APRESOLINE) 20 mg/mL injection 10 mg  10 mg IntraVENous Q6H PRN  
 insulin glargine (LANTUS) injection 10 Units  10 Units SubCUTAneous DAILY  [Held by provider] bumetanide (BUMEX) tablet 2 mg  2 mg Oral DAILY  busPIRone (BUSPAR) tablet 10 mg  10 mg Oral BID  doxazosin (CARDURA) tablet 4 mg  4 mg Oral QHS  sertraline (ZOLOFT) tablet 50 mg  50 mg Oral DAILY  glucose chewable tablet 16 g  4 Tab Oral PRN  
 dextrose (D50W) injection syrg 12.5-25 g  25-50 mL IntraVENous PRN  
 glucagon (GLUCAGEN) injection 1 mg  1 mg IntraMUSCular PRN  
 insulin lispro (HUMALOG) injection   SubCUTAneous AC&HS  sodium chloride (NS) flush 5-10 mL  5-10 mL IntraVENous PRN Exam:   
General:  Awake cooperative, Eyes:  Sclera anicteric. Pupils equally round and reactive to light. Mouth/Throat: Mucous membranes normal, oral pharynx clear Neck: Supple Lungs:   Clear to auscultation CV:  Regular rate and rhythm,no murmur, click, rub or gallop Abdomen:   Soft, non-tender. bowel sounds normal. non-distended Extremities: No cyanosis or edema Skin: Skin color, texture, turgor normal. no acute rash or lesions Lymph nodes: Cervical and supraclavicular normal  
Musculoskeletal: LLE BKA Lines/Devices:  Intact, no erythema, drainage or tenderness Psych: Resting , cannot assess Data Review: CBC:  
Recent Labs 05/04/20 
0321 05/03/20 
1719 05/03/20 
0533 05/02/20 
7551 WBC 8.9 8.2 8.5 14.0*  
RBC 2.77* 2.43* 2.65* 2.78* HGB 8.0* 7.0* 7.4* 7.8* HCT 26.4* 23.2* 25.2* 26.1*  
* 427* 462* 510* GRANS 68  --  71 89* LYMPH 18  --  16 3*  
EOS 2  --  1 0 CMP:  
Recent Labs 05/04/20 
0321 05/03/20 
1719 05/03/20 
0533 GLU 93 103* 94  137 135* K 4.1 4.2 3.9  103 102 CO2 26 27 27 BUN 24* 26* 22* CREA 1.90* 1.91* 1.87* CA 8.4* 8.8 8.7 AGAP 6 7 6 BUCR 13 14 12 Lab Results Component Value Date/Time  Culture result: NO GROWTH 6 DAYS 04/21/2020 08:50 AM  
 Culture result: NO ANAEROBES ISOLATED 04/21/2020 07:20 AM  
 Culture result: LIGHT DIPHTHEROIDS (A) 04/21/2020 07:20 AM  
 Culture result: LIGHT STAPHYLOCOCCUS AUREUS (A) 04/20/2020 05:48 PM  
 Culture result: (A) 04/20/2020 05:48 PM  
  FEW STREPTOCOCCI, BETA HEMOLYTIC GROUP G Penicillin and ampicillin are drugs of choice for treatment of beta-hemolytic streptococcal infections. Susceptibility testing of penicillins and beta-lactams approved by the FDA for treatment of beta-hemolytic streptococcal infections need not be performed routinely, because nonsusceptible isolates are extremely rare. CLSI 2012 XR Results (most recent): 
Results from Hospital Encounter encounter on 04/19/20 XR CHEST PORT Narrative Portable chest single view dated 4/20/2020 Comparison chest dated 4/19/2020 History is status post pacemaker change. A single portable AP upright view of the chest was obtained. The patient is 
slightly rotated towards the right. It is difficult to accurately assess the 
size of the cardiac silhouette. There has been interval removal of the cardiac 
pacer device and leads which were present on the left. A metallic wire projects 
over the upper portion of the right hemithorax. There is a suggestion of some 
increased density in the medial aspect of the upper portion of the right 
hemithorax. This may be associated with the brachiocephalic vasculature. A 
follow-up, true frontal examination of the chest may prove useful. Surgical 
clips project over the right lung base. The costophrenic angles are sharp in 
appearance. There is evidence of postsurgical change involving the cervical 
spine. Impression IMPRESSION: 
1. Interval removal of the cardiac pacer device and leads as described above. 2. Presence of a radiopaque wire which projects over the upper portion of the 
right hemithorax. ICD-10-CM ICD-9-CM 1. Ulcer of left foot, unspecified ulcer stage ([address] L97.529 707.15   
2. Osteomyelitis of left foot, unspecified type ([address] M86.9 730.27 3. Cellulitis of left lower extremity L03.116 682.6 4. Disorder of cardiac pacemaker system, subsequent encounter T82. 9XXD V58.89 ELECTROPHYSIOLOGY PROCEDURE  
  996.72 ELECTROPHYSIOLOGY PROCEDURE  
   ELECTROPHYSIOLOGY PROCEDURE  
   ELECTROPHYSIOLOGY PROCEDURE  
   CANCELED: INVASIVE VASCULAR PROCEDURE  
   CANCELED: INVASIVE VASCULAR PROCEDURE Antibotic History Daptomycin IV - 5/1 
 s/p Vancomycin IV I have discussed the diagnosis with the patient and the intended plan as seen in the above orders. I have discussed medication side effects and warnings with the patient as well. Reviewed test results at length with patient Signed By: Justina Esquivel MD [facility]

## 2020-05-04 NOTE — PROGRESS NOTES
Nutrition Assessment: 
 
INTERVENTIONS/RECOMMENDATIONS:  
· Resume Consistent carb when medically feasible · Continue glucerna shakes · Please document % meals and supplements consumed in flowsheet I/O's under intake ASSESSMENT:  
Chart reviewed. S/p left BKA on 5/1. Currently NPO for [facility] cath placement. Pt reports continued with poor/fair intake due to dislike of food. She is consuming glucerna. Protein intake was encouraged for wound healing. Noted for last documented BM on 4/30. Diet Order: Cardiac 
% Eaten:   
Patient Vitals for the past 72 hrs: 
 % Diet Eaten 05/03/20 1254 50 % 05/02/20 1300 30 % 05/02/20 1058 50 % 05/01/20 1811 65 % Pertinent Medications: [x]Reviewed: NS, lantus, humalog, warfarin Pertinent Labs: [x]Reviewed: BG <180, Food Allergies: [x]NKFA  []Other (fish, shellfish) Last BM:  4/30 Edema:  n/a    []RUE   []LUE   []RLE   []LLE Pressure Ulcer:  S/p BKA    [] Stage I   [] Stage II   [] Stage III   [] Stage IV Anthropometrics: Height: 5' 10\" (177.8 cm) Weight: 109.7 kg (241 lb 12.8 oz) IBW (%IBW):   ( ) UBW (%UBW):   (  %) BMI: Body mass index is 34.69 kg/m². This BMI is indicative of: 
[]Underweight   []Normal   []Overweight   [x] Obesity   [] Extreme Obesity (BMI>40) Last Weight Metrics: 
Weight Loss Metrics 5/4/2020 3/26/2020 3/19/2020 1/24/2020 1/14/2020 12/12/2019 12/6/2019 Today's Wt 241 lb 12.8 oz 251 lb 238 lb 8.6 oz 246 lb 250 lb 3.6 oz 263 lb 251 lb 1.7 oz  
BMI 34.69 kg/m2 37.07 kg/m2 35.23 kg/m2 35.3 kg/m2 35.9 kg/m2 38.84 kg/m2 37.08 kg/m2 Estimated Nutrition Needs (Based on): 8588 Kcals/day(20 kcal/kg Adj BW) , 110 g(1 g/kg) Protein Carbohydrate: At Least 130 g/day  Fluids: 1575 mL/day or per primary team 
 
NUTRITION DIAGNOSES:  
Problem:  Increased nutrient needs Etiology: related to wound healing Signs/Symptoms: as evidenced by bilateral full thickness foot wounds  s/p BKA Previous Nutrition Dx:  [] Resolved  [] Unresolved           [x] Progressing NUTRITION INTERVENTIONS: 
Meals/Snacks: General/healthful diet   Supplements: Commercial supplement GOAL:  
consume >50% of meals and ONS in 3-5 days NUTRITION MONITORING AND EVALUATION Food/Nutrient Intake Outcomes: Total energy intake Physical Signs/Symptoms Outcomes: Weight/weight change, Electrolyte and renal profile, Glucose profile, GI 
 
Previous Goal Met: 
 [] Met              [x] Progressing Towards Goal              [] Not Progressing Towards Goal  
Previous Recommendations: 
 [x] Implemented          [] Not Implemented          [] Not Applicable LEARNING NEEDS (Diet, Food/Nutrient-Drug Interaction):  
 [x] None Identified 
 [] Identified and Education Provided/Documented 
 [] Identified and Pt declined/was not appropriate Cultural, Zoroastrian, OR Ethnic Dietary Needs:  
 [x] None Identified 
 [] Identified and Addressed 
 
 [x] Interdisciplinary Care Plan Reviewed/Documented  
 [x] Discharge Planning: Consistent carb diet, low Na diet 
 [] Participated in Interdisciplinary Rounds NUTRITION RISK:  
 [] High              [x] Moderate           []  Low  []  Minimal/Uncompromised Danielle López RDN Pager [phone] Weekend Pager [phone]

## 2020-05-04 NOTE — PROGRESS NOTES
Occupational Therapy Chart reviewed, attempted skilled OT treatment; however, pt declined 2/2 awaiting catheter placement, as well as, reports of being in \"crummy\" mood 2/2 NPO and associated hunger. Educated pt on benefits of EOB sitting, isometric scapular adduction, elbow extension, as well as, long sitting LE thera ex as tolerated- good understanding verbalized. Will defer OT at this time and continue to follow as able and appropriate.  
 
Thank you, 
Garcia Land, OT

## 2020-05-04 NOTE — PROGRESS NOTES
Hospitalist Progress Note NAME: Sonido Carrillo :  1960 MRN:  [id number] I reviewed with Dr. Ryan Carmichael about the medical history and the findings on the physical examination. I discussed with Dr. Ryan Carmichael the patient's diagnosis and concur with the plan. Interim Hospital Summary: 60 y.o. female whom presented on 2020 with fever due to infected left foot and possible OM of left metatarsals. Pt was discharged after being treated for left leg cellulitis on 3/20/2020. Assessment / Plan: 
Since the removal of pacer lead extraction on , pt has been experiencing rapid a-fib to transient bradycardia without pause. Discussed this concerns with Dr. Estefani Saenz today. Was recommend to start on IV Cardizem for rate control but she has soft blood pressure. During all this event, the pt is asymptomatic. BB has been d/c'd. Last dose given on . Remain A-fib, rate varies. No intervention at this time. Discussed with Dr. Estefani Saenz on . 
 
: Scheduled to have Simone catheter placement. Pt needs to complete total 6 weeks of IV ABX from 2020. IV ABX order recommendation per ID team. 
CM following on rehab placement. Sepsis secondary to left foot cellulitis and OM left metatarsals 3,4,and 5 S/p Left BKA 2020 by Dr. Vernell Holbrook - appreciate podiatrist's input; concerned with prolonged IV ABX tmt. Dr. Sweta Sebastian will discuss with Vascular service on either a BKA vs Chopart's amputation of left side CT of lower extremity: Large soft tissue ulcer at the base of the fifth metatarsal. No definite evidence of osteomyelitis Post op care per vascular surgery team  
  Pain management challenging; in and out of Atrial fib with bradycardia and hypotension. Continue with Neurontin in hope to control phantom pain 
  Continue with percocet with ultram for breakthrough pain No IV pain medication at this time. Pt is tolerating PO without difficulty.   
 
Bacteremia with MSSA 
- continue with ID team's input; duration of the therapy, 6 weeks from 5/1 from the surgery date BC (4/19)  MSSA - 1/1 - LAC 
  BC (4/19)  MSSA - 1/1 - RAC 
  BC (4/21)  NG 
  WC (4/20) Light  MSSA,few Strep Gp G beta hemolytic ECHO - possible vegetation on AV Pacemaker present - no swelling , erythema MIKE - mobile vegetation on pacer lead; the lead has been extracted on 4/27 IV vanc was changed IV Daptomycin due to worsening of creat Weekly CBC, CMP & ESR/ CRP every other week  Fax reports to [phone], call with abnormal results at [phone]. Call with antibiotic related adverse events Scheduled to have Simone catheter placement 5/4 Vegetation on pacemaker lead S/p dual lead extraction and device removal 4/27/2020 by Dr. Kushal Sahu 
Anemia of chronic disease - INR 1.2 & hgb 7.4 Transfuse PRN if hgb <7.0 Atrial fibrillation Hypercoagulable state secondary to afib HTN 
SSS 
- In and out of A-fib with intermittent bradycardia; will have cardiology to review the case for recommendation; last seen by cardiologist team on 4/29 Continue Cardizem, doxazosin, hydralazine HR down to 40-50's at times, BB has been d/c'd 
   Coumadin per pharmacy; INR 1.2 
  
PARAG on CKD stage IV 
- creat trending up 1.9 Start on gentle IV hydration Avoid nephrotoxic agents. Appreciate nephrology input; 
  
Type 2 DM with hyperglycemia 
- Hgb A1c 7.5. continue with lantus Check qac/qhs blood glucose and follow SSI 
  
Depression and anxiety 
- Continue sertraline and buspirone  
  
30.0 - 39.9 Obese / Body mass index is 34.87 kg/m². Code status: Full Prophylaxis: SCD's Recommended Disposition: Rehab Subjective: Chief Complaint / Reason for Physician Visit \"pain is ok. I am ready get out of here\"  Discussed with RN events overnight. Review of Systems: 
Symptom Y/N Comments  Symptom Y/N Comments Fever/Chills n   Chest Pain n   
Poor Appetite    Edema Cough    Abdominal Pain Sputum    Joint Pain    
SOB/CHOW n   Pruritis/Rash Nausea/vomit n   Tolerating PT/OT Diarrhea n   Tolerating Diet Constipation n   Other Could NOT obtain due to:   
 
Objective: VITALS:  
Last 24hrs VS reviewed since prior progress note. Most recent are: 
Patient Vitals for the past 24 hrs: 
 Temp Pulse Resp BP SpO2  
05/04/20 0744 98.4 °F (36.9 °C) 60 20 135/56 96 % 05/04/20 0400 98.3 °F (36.8 °C) (!) 59 16 125/56 96 % 05/03/20 2245 98.4 °F (36.9 °C) 62 16 137/55 96 % 05/03/20 2208 98.6 °F (37 °C) 62 16 140/56 98 % 05/03/20 2130  61 16 135/56 99 % 05/03/20 2100  60 16 130/59 97 % 05/03/20 2030 98.4 °F (36.9 °C) (!) 57 16 128/55 98 % 05/03/20 2000  (!) 59 16 129/54 96 % 05/03/20 1945 98.3 °F (36.8 °C) 60 18 118/51 93 % 05/03/20 1925 98.3 °F (36.8 °C) 61 16 121/55 100 % 05/03/20 1902 98.5 °F (36.9 °C) (!) 58 16 108/45 95 % 05/03/20 1859 98.2 °F (36.8 °C) 65 16 106/41 90 % 05/03/20 1540 98.3 °F (36.8 °C) (!) 40 16 107/44 96 % 05/03/20 1509  (!) 38 16 111/45 99 % 05/03/20 1505  (!) 38  99/44   
05/03/20 1456  (!) 41  99/44   
05/03/20 1427  (!) 42 16 94/45 93 % 05/03/20 1405  (!) 47 16 97/49 97 % 05/03/20 1055 98.3 °F (36.8 °C) (!) 57 16 120/57 96 % 05/03/20 1010  (!) 56    Intake/Output Summary (Last 24 hours) at 5/4/2020 5693 Last data filed at 5/4/2020 0400 Gross per 24 hour Intake 980.4 ml Output 1450 ml Net -469.6 ml  
  
 
PHYSICAL EXAM: 
General: Ill appearing. Alert, cooperative, no acute distress EENT:  EOMI. Anicteric sclerae. MMM Resp:  Clear in apex with decreased breath sounds at bases. no wheezing or rales. No accessory muscle use CV:  irregular  rhythm,  No edema GI:  Soft, Non distended, Non tender. +Bowel sounds Neurologic:  Alert and oriented X 3, normal speech, Psych:   Fair insight. Not anxious nor agitated Skin:  No rashes. No jaundice, left BKA site dressing dry and intact.   
                        Discoloration of right lower 1/3 leg to foot; no changes Reviewed most current lab test results and cultures  YES Reviewed most current radiology test results   YES Review and summation of old records today    NO Reviewed patient's current orders and MAR    YES 
PMH/SH reviewed - no change compared to H&P 
________________________________________________________________________ Care Plan discussed with: 
  Comments Patient y Family RN y   
Care Manager Consultant Multidiciplinary team rounds were held today with , nursing, pharmacist and clinical coordinator. Patient's plan of care was discussed; medications were reviewed and discharge planning was addressed. ________________________________________________________________________ Osei Amezcua NP Procedures: see electronic medical records for all procedures/Xrays and details which were not copied into this note but were reviewed prior to creation of Plan. LABS: 
I reviewed today's most current labs and imaging studies. Pertinent labs include: 
Recent Labs 05/04/20 
0321 05/03/20 
1719 05/03/20 
0533 WBC 8.9 8.2 8.5 HGB 8.0* 7.0* 7.4* HCT 26.4* 23.2* 25.2*  
* 427* 462* Recent Labs 05/04/20 
0321 05/03/20 
1719 05/03/20 
0533 05/02/20 
2518  137 135* 137  
K 4.1 4.2 3.9 3.9  103 102 105 CO2 26 27 27 27 GLU 93 103* 94 166* BUN 24* 26* 22* 19  
CREA 1.90* 1.91* 1.87* 1.70* CA 8.4* 8.8 8.7 8.7 MG  --  2.6*  --   --   
INR 1.3*  --  1.2* 1.1 Signed: )Becky Winn NP

## 2020-05-04 NOTE — PROGRESS NOTES
Nephrology Progress Note [facility] 
www. [facility]."[employer]                  Phone - [phone] Patient: Lorenzo Oppenheim Date- 5/4/2020 Admit Date: 4/19/2020 YOB: 1960 CC: Follow up for PARAG on ckd Subjective: Interval History: -  5/4/2020 CR stable at 1.9 
bp on lowside Bradycardia over the weekend 5/1/2020 Ms. Wharton has pmh of ckd, htn, dm. She is admitted with left foot cellulitis,. She is dx with MSSA sepsis. She has undergone LEFT BKA today She vegetation on pacemaker leads- pace maker removed on 4- She has developed parag with cr. 1.7 today She is on vanco. Her vanco level was 25.9 on 4- Her bp has been on low side. Her cr. Level has been higher in past 
 
Date. ..------. ....      . cr 
5-1-2020----------1.69 
4--------1.47 
4.22.2020---    ---1.52 
4---    --1.95 
Cr. Trends Ref. Range 1/20/2020 04:07 3/8/2020 11:25 3/9/2020 04:30 3/10/2020 01:58 3/11/2020 03:58 3/12/2020 03:54 3/13/2020 04:10 3/14/2020 02:22 3/15/2020 04:12 3/16/2020 00:53 3/17/2020 00:55 3/18/2020 04:01 3/19/2020 01:35 3/20/2020 00:52 3/21/2020 05:06 3/22/2020 03:27 4/19/2020 21:53 4/21/2020 05:33 4/22/2020 01:17 4/23/2020 02:21 4/24/2020 04:09 4/25/2020 00:53 4/26/2020 03:37 4/27/2020 03:04 4/28/2020 04:57 4/29/2020 04:48 4/30/2020 02:07 5/1/2020 04:12 Creatinine Latest Ref Range: 0.55 - 1.02 MG/DL 2.12 (H) 2.17 (H) 1.92 (H) 1.88 (H) 1.91 (H) 1.88 (H) 1.86 (H) 1.95 (H) 2.06 (H) 2.19 (H) 2.41 (H) 2.38 (H) 2.51 (H) 2.48 (H) 2.38 (H) 2.12 (H) 1.95 (H) 1.89 (H) 1.68 (H) 1.54 (H) 1.52 (H) 1.38 (H) 1.46 (H) 1.34 (H) 1.35 (H) 1.45 (H) 1.47 (H) 1.69 (H) · IMPRESSION:  
· PARAG  - likely due to ATN , low bp, SEPSIS , ATN due to vanco.  
· MSSA sepsis · Removal of pace maker · S/p LEFT BKA · ckd  3 LIKELY due to DM nephropathy and HTN nephrosclerosis · Hyponatremia due to fluid overload · hypertension · Diabetic foot · Proteinuria likely due to DM nephropathy and HTN nephroscl- urine pr/cr 1.5 g/g · Metabolic acidosis · anemia iron defi - iron sats 10% · Vit d defi PLAN:  
· Hold bumex · STOP CARDURA · Follow bmp · Avoid hypotension · NO PICC line. · Stop  Iv in the evening · D/w patient ROS:-No C/O of chest pain,SOB, vomiting, abdominal pain,fever,chills Physical exam:  
GEN:NAD 
NECK- Supple, no mass RESP: CTA  b/l, no  wheezing, Decreased BS at base CVS: S1,S2 , RRR 
ABDO: soft , non tender, No mass NEURO: normal speech, non focal 
EXT: left BKA stump + Objective:  
Visit Vitals /51 Pulse 61 Temp 98.2 °F (36.8 °C) Resp 18 Ht 5' 10\" (1.778 m) Wt 109.7 kg (241 lb 12.8 oz) SpO2 93% Breastfeeding No  
BMI 34.69 kg/m² Last 3 Recorded Weights in this Encounter 05/02/20 0409 05/03/20 0539 05/04/20 0400 Weight: 103.4 kg (228 lb) 107.8 kg (237 lb 11.2 oz) 109.7 kg (241 lb 12.8 oz) 05/02 1901 - 05/04 0700 In: 1460.4 [P.O.:1160] Out: 1950 [CQTDE:3309] Intake/Output Summary (Last 24 hours) at 5/4/2020 1151 Last data filed at 5/4/2020 0400 Gross per 24 hour Intake 980.4 ml Output 1450 ml Net -469.6 ml Chart reviewed. Pertinent Notes reviewed. Data Review : 
Recent Labs 05/04/20 0321 05/03/20 1719 05/03/20 
0533  137 135* K 4.1 4.2 3.9  103 102 CO2 26 27 27 BUN 24* 26* 22* CREA 1.90* 1.91* 1.87* GLU 93 103* 94  
CA 8.4* 8.8 8.7 MG  --  2.6*  --   
 
Recent Labs 05/04/20 0321 05/03/20 1719 05/03/20 
0533 WBC 8.9 8.2 8.5 HGB 8.0* 7.0* 7.4* HCT 26.4* 23.2* 25.2*  
* 427* 462* No results for input(s): FE, TIBC, PSAT, FERR in the last 72 hours. Recent Labs 05/04/20 
0321 CPK 80 Results for Jonna Sanchez (MRN [id number]) as of 5/1/2020 16:17 Ref.  Range 11/1/2013 15:35 8/11/2019 12:49 1/16/2020 18:40 1/17/2020 13:01 1/20/2020 08:30 3/10/2020 10:10 3/12/2020 13:12 4/21/2020 22:38 4/25/2020 00:53 4/26/2020 13:16 4/29/2020 16:10 4/30/2020 20:32 Vancomycin,trough Latest Ref Range: 5.0 - 10.0 ug/mL 25.8 (HH) 19.4 (H) 20.7 (HH)  24.9 (HH) 19.6 (H) 25.0 (HH) 20.6 (HH) 20.9 (HH) 20.8 (HH) 25.9 (HH) Vancomycin, random Latest Units: UG/ML    23.8        23.7 Lab Results Component Value Date/Time Color YELLOW/STRAW 03/08/2020 05:00 PM  
 Appearance CLOUDY (A) 03/08/2020 05:00 PM  
 Specific gravity 1.019 03/08/2020 05:00 PM  
 pH (UA) 5.0 03/08/2020 05:00 PM  
 Protein 100 (A) 03/08/2020 05:00 PM  
 Glucose 100 (A) 03/08/2020 05:00 PM  
 Ketone 15 (A) 03/08/2020 05:00 PM  
 Bilirubin NEGATIVE  03/08/2020 05:00 PM  
 Urobilinogen 0.2 03/08/2020 05:00 PM  
 Nitrites NEGATIVE  03/08/2020 05:00 PM  
 Leukocyte Esterase SMALL (A) 03/08/2020 05:00 PM  
 Epithelial cells FEW 03/08/2020 05:00 PM  
 Bacteria NEGATIVE  03/08/2020 05:00 PM  
 WBC 0-4 03/08/2020 05:00 PM  
 RBC 0-5 03/08/2020 05:00 PM  
 
Lab Results Component Value Date/Time Culture result: NO GROWTH 6 DAYS 04/21/2020 08:50 AM  
 Culture result: NO ANAEROBES ISOLATED 04/21/2020 07:20 AM  
 Culture result: LIGHT DIPHTHEROIDS (A) 04/21/2020 07:20 AM  
 
Lab Results Component Value Date/Time Specimen Description: RENEE 04/09/2014 12:27 PM  
 Specimen Description: RENEE 10/28/2013 05:00 PM  
 Specimen Description: [facility] 10/28/2013 03:09 PM  
 
Lab Results Component Value Date/Time Sodium,urine random 19 03/20/2020 01:20 PM  
 Creatinine, urine 125.00 03/20/2020 01:20 PM  
 
Results from [facility] Encounter encounter on 04/19/20 XR CHEST PORT Narrative Portable chest single view dated 4/20/2020 Comparison chest dated 4/19/2020 History is status post pacemaker change. A single portable AP upright view of the chest was obtained. The patient is 
slightly rotated towards the right. It is difficult to accurately assess the 
size of the cardiac silhouette.  There has been interval removal of the cardiac 
pacer device and leads which were present on the left. A metallic wire projects 
over the upper portion of the right hemithorax. There is a suggestion of some 
increased density in the medial aspect of the upper portion of the right 
hemithorax. This may be associated with the brachiocephalic vasculature. A 
follow-up, true frontal examination of the chest may prove useful. Surgical 
clips project over the right lung base. The costophrenic angles are sharp in 
appearance. There is evidence of postsurgical change involving the cervical 
spine. Impression IMPRESSION: 
1. Interval removal of the cardiac pacer device and leads as described above. 2. Presence of a radiopaque wire which projects over the upper portion of the 
right hemithorax. Medication list  reviewed Current Facility-Administered Medications Medication  0.9% sodium chloride infusion  
 0.9% sodium chloride infusion 250 mL  WARFARIN INFORMATION NOTE (COUMADIN)  traMADoL (ULTRAM) tablet 50 mg  
 gabapentin (NEURONTIN) capsule 100 mg  polyethylene glycol (MIRALAX) packet 17 g  
 docusate sodium (COLACE) capsule 100 mg  
 alcohol 62% (NOZIN) nasal  1 Ampule  epoetin viet-epbx (RETACRIT) injection 20,000 Units  DAPTOmycin (CUBICIN) 900 mg in 0.9% sodium chloride 50 mL IVPB  oxyCODONE-acetaminophen (PERCOCET) 5-325 mg per tablet 1 Tab  hydrALAZINE (APRESOLINE) 20 mg/mL injection 10 mg  
 insulin glargine (LANTUS) injection 10 Units  [Held by provider] bumetanide (BUMEX) tablet 2 mg  busPIRone (BUSPAR) tablet 10 mg  
 doxazosin (CARDURA) tablet 4 mg  sertraline (ZOLOFT) tablet 50 mg  
 glucose chewable tablet 16 g  
 dextrose (D50W) injection syrg 12.5-25 g  
 glucagon (GLUCAGEN) injection 1 mg  
 insulin lispro (HUMALOG) injection  sodium chloride (NS) flush 5-10 mL Vijay Hudson MD 
[facility] 
 [email or web address] / [facility] [place] Office [address] [place], [address] Phone - [phone] Fax - [phone]

## 2020-05-04 NOTE — PROGRESS NOTES
Pharmacy Daily Dosing of Warfarin Indication: AFib Goal INR: 2-3 PTA Warfarin Dose: 4 mg on Tue, Sat; 2 mg on Mon, Wed, Thu, Fri, Sun. 
 
Concurrent anticoagulants: None Concurrent antiplatelet: none Major Interacting Medications Drugs that may increase INR: none Drugs that may decrease INR: none Conditions that may increase/decrease INR (CHF, C. diff, cirrhosis, thyroid disorder, hypoalbuminemia): None Labs: 
Recent Labs 05/04/20 
0321 05/03/20 
1719 05/03/20 
0533 05/02/20 
0980 INR 1.3*  --  1.2* 1.1 HGB 8.0* 7.0* 7.4* 7.8* * 427* 462* 510* Impression/Plan:  
Will order warfarin 5 mg PO x 1 dose. Daily INR 
CBC w/o diff every other day Pharmacy will follow daily and adjust the dose as appropriate. Thanks Dimple Munson PHARMD 
 
 
[email or web address]/[facility]/Pharmacy/Clinical%20Companion/Warfarin%20Dosing%20Protocol. pdf

## 2020-05-04 NOTE — ROUTINE PROCESS
1500- Pt in for [facility] catheter insertion. Dr Cheri Huston in to consult pt. Pt aware of risks and benefits and wishes to proceed. 1625- Pt to unit per this nurse. Report at bedside with primary nurse. Drsg to right chest D/I.

## 2020-05-05 NOTE — PROGRESS NOTES
TEE: Savannah 48 
 
4:53pm- CM called pt's room via phone due to COVID-19 restrictions to discuss d/c plan. CM inquired with pt if she and her  will be able to handle the IV antibiotics. Pt stated that she was on IV antibiotics in the past. Pt stated that she just needs the teaching and she will be fine. 11:30am-Santo with [facility] called CM via phone about referral. Yuni Jagdish stated that she has not been able to get in contact with pt's  to see if they are able to handle the IV antibiotics at home for when pt is d/c from [facility]. CM informed Yuni Dubon that CM will call pt. CM will continue to follow patient for discharge planning needs and arrange for services as deemed necessary. Gene Vincent [address] [place] [address] 
[phone]

## 2020-05-05 NOTE — PROGRESS NOTES
Problem: Mobility Impaired (Adult and Pediatric) Goal: *Acute Goals and Plan of Care (Insert Text) Description: FUNCTIONAL STATUS PRIOR TO ADMISSION: Patient was able to ambulate indep for short household distances with RW, limited by wound on left foot. HOME SUPPORT PRIOR TO ADMISSION: Patient lives with  in a single story home, was able to sponge bathe and dress without assist.   shops for groceries, patient able to do th cooking. Physical Therapy Goals Initiated 5/2/2020 1. Patient will move from supine to sit and sit to supine  in bed with independence within 7 day(s). 2.  Patient will transfer from bed to chair and chair to bed with minimal assistance/contact guard assist using the least restrictive device within 7 day(s). 3.  Patient will perform sit to stand with minimal assistance/contact guard assist within 7 day(s). 4.  Patient will ambulate with minimal assistance/contact guard assist for 10 feet with the least restrictive device within 7 day(s). Outcome: Progressing Towards Goal 
 PHYSICAL THERAPY TREATMENT Patient: Cristy Wong (77 y.o. female) Date: 5/5/2020 Diagnosis: Sepsis ([address] [A41.9] <principal problem not specified> Procedure(s) (LRB): LEFT BELOW KNEE AMPUTATION (Left) 4 Days Post-Op Precautions:   
Chart, physical therapy assessment, plan of care and goals were reviewed. ASSESSMENT Patient continues with skilled PT services and is progressing towards goals. Pt received supine in bed with LLE limb guard and agreeable to therapy. Pt tolerated session well. Pt continues to be limited by UE weakness, decreased functional mobility, inability to stand, fear of falling and decreased tolerance to activity. Pt attempted sit<>stand x 2 trials from elevated bed height, but unable to clear buttocks. Pt reported having chronic R knee issues and bilateral UE weakness limiting her abilities.  Pt practiced scooting lateral along bedside in prep for lateral transfers as well as UE strengthening. Reviewed LE therex program and pt demonstrated good carry over and understanding. Pt will benefit from inpatient rehab upon discharge to continue therapy efforts. Current Level of Function Impacting Discharge (mobility/balance): min A rolling, SBA supine to sit, good sitting balance, unable to achieve standing position PLAN : 
Patient continues to benefit from skilled intervention to address the above impairments. Continue treatment per established plan of care. to address goals. Recommendation for discharge: (in order for the patient to meet his/her long term goals) Therapy 3 hours per day 5-7 days per week SUBJECTIVE:  
Patient stated I really dont trust my good leg.  OBJECTIVE DATA SUMMARY:  
Critical Behavior: 
Neurologic State: Alert Orientation Level: Oriented X4 Cognition: Appropriate for age attention/concentration Safety/Judgement: Fall prevention, Home safety Functional Mobility Training: 
Bed Mobility: 
Rolling: Minimum assistance Supine to Sit: Stand-by assistance; Additional time;Bed Modified; Adaptive equipment Sit to Supine: Stand-by assistance Scooting: Stand-by assistance(while seated EOB) Transfers: 
Sit to Stand: (unable to clear buttocks--fearful of falling) Balance: 
Sitting: Intact Standing: (unable to achieve full standing) Ambulation/Gait Training: 
  
    
 
Therapeutic Exercises:  
Quad sets, SLR, hip abduction, single limb bridge on the R Pain Rating: 
Pt denied pain Activity Tolerance:  
Good and Fair Please refer to the flowsheet for vital signs taken during this treatment. After treatment patient left in no apparent distress:  
Supine in bed, Call bell within reach, and Side rails x 3 
 
COMMUNICATION/COLLABORATION:  
The patients plan of care was discussed with: Occupational therapist and Registered nurse. Renee Vanessa, PT, DPT Time Calculation: 23 mins

## 2020-05-05 NOTE — PROGRESS NOTES
Pharmacy Daily Dosing of Warfarin Indication: AFib Goal INR: 2-3 PTA Warfarin Dose: 4 mg on Tue, Sat; 2 mg on Mon, Wed, Thu, Fri, Sun. 
 
Concurrent anticoagulants: None Concurrent antiplatelet: none Major Interacting Medications Drugs that may increase INR: none Drugs that may decrease INR: none Conditions that may increase/decrease INR (CHF, C. diff, cirrhosis, thyroid disorder, hypoalbuminemia): None Labs: 
Recent Labs 05/05/20 
2263 05/04/20 
0321 05/03/20 
1719 05/03/20 
0533 INR 1.4* 1.3*  --  1.2* HGB  --  8.0* 7.0* 7.4* PLT  --  427* 427* 462* Impression/Plan:  
Will order warfarin 7.5 mg PO x 1 dose. Daily INR 
CBC w/o diff every other day Pharmacy will follow daily and adjust the dose as appropriate. Thanks RICO LevineD 
 
 
[email or web address]/[employer] [facility]/[place]/[facility]/Pharmacy/Clinical%20Companion/Warfarin%20Dosing%20Protocol. pdf

## 2020-05-05 NOTE — PROGRESS NOTES
Nephrology Progress Note [facility] 
[email or web address]. [email or web address]                  Phone - [phone] Patient: Rajan Garcia Date- 5/5/2020 Admit Date: 4/19/2020 YOB: 1960 CC: Follow up for PARAG on ckd Subjective: Interval History: - 5/5/2020 Cr. Improved to 1.5 
 
 5/4/2020 CR stable at 1.9 
bp on lowside Bradycardia over the weekend 5/1/2020 Ms. Wharton has pmh of ckd, htn, dm. She is admitted with left foot cellulitis,. She is dx with MSSA sepsis. She has undergone LEFT BKA today She vegetation on pacemaker leads- pace maker removed on 4- She has developed parag with cr. 1.7 today She is on vanco. Her vanco level was 25.9 on 4- Her bp has been on low side. Her cr. Level has been higher in past 
 
Date. ..------. ....      . cr 
5-1-2020----------1.69 
4--------1.47 
4.22.2020---    ---1.52 
4---    --1.95 
Cr. Trends Ref. Range 1/20/2020 04:07 3/8/2020 11:25 3/9/2020 04:30 3/10/2020 01:58 3/11/2020 03:58 3/12/2020 03:54 3/13/2020 04:10 3/14/2020 02:22 3/15/2020 04:12 3/16/2020 00:53 3/17/2020 00:55 3/18/2020 04:01 3/19/2020 01:35 3/20/2020 00:52 3/21/2020 05:06 3/22/2020 03:27 4/19/2020 21:53 4/21/2020 05:33 4/22/2020 01:17 4/23/2020 02:21 4/24/2020 04:09 4/25/2020 00:53 4/26/2020 03:37 4/27/2020 03:04 4/28/2020 04:57 4/29/2020 04:48 4/30/2020 02:07 5/1/2020 04:12 Creatinine Latest Ref Range: 0.55 - 1.02 MG/DL 2.12 (H) 2.17 (H) 1.92 (H) 1.88 (H) 1.91 (H) 1.88 (H) 1.86 (H) 1.95 (H) 2.06 (H) 2.19 (H) 2.41 (H) 2.38 (H) 2.51 (H) 2.48 (H) 2.38 (H) 2.12 (H) 1.95 (H) 1.89 (H) 1.68 (H) 1.54 (H) 1.52 (H) 1.38 (H) 1.46 (H) 1.34 (H) 1.35 (H) 1.45 (H) 1.47 (H) 1.69 (H) · IMPRESSION:  
· parag - likely due to ATN , low bp, SEPSIS , ATN due to vanco.  
· MSSA sepsis · Removal of pace maker · S/p LEFT BKA · ckd  3 LIKELY due to DM nephropathy and HTN nephrosclerosis · Hyponatremia due to fluid overload · hypertension · Diabetic foot · Proteinuria likely due to DM nephropathy and HTN nephroscl- urine pr/cr 1.5 g/g · Metabolic acidosis · anemia iron defi - iron sats 10% · Vit d defi PLAN:  
· Restart bumex 1 mg daily starting tomorrow · Avoid metolazone · Restart hydralazine or start norvasc if bp high 
· Okay to d/c renal stand point. Follow up in 1 month · D/w patient ROS:- No c/o sob,  No c/o chest pain, No c/o nausea or vomiting, No c/o  fever. Physical exam:  
GEN:  NAD NECK:  Supple, no thyromegaly RESP: CTA  b/l, no  wheezing, CVS: RRR,S1,S2 ABDO:  soft , non tender, No mass NEURO: non focal, normal speech EXT: left BKA stump + Objective:  
Visit Vitals /58 (BP 1 Location: Left arm, BP Patient Position: At rest) Pulse 70 Temp 97.9 °F (36.6 °C) Resp 18 Ht 5' 10\" (1.778 m) Wt 108.4 kg (238 lb 15.7 oz) SpO2 96% Breastfeeding No  
BMI 34.29 kg/m² Last 3 Recorded Weights in this Encounter 05/03/20 0539 05/04/20 0400 05/05/20 0518 Weight: 107.8 kg (237 lb 11.2 oz) 109.7 kg (241 lb 12.8 oz) 108.4 kg (238 lb 15.7 oz) 05/03 1901 - 05/05 0700 In: 1492.9 [P.O.:680; I.V.:512.5] Out: 2900 [Urine:2900] Intake/Output Summary (Last 24 hours) at 5/5/2020 0920 Last data filed at 5/5/2020 1481 Gross per 24 hour Intake 752.5 ml Output 1850 ml Net -1097.5 ml Chart reviewed. Pertinent Notes reviewed. Data Review : 
Recent Labs 05/05/20 
0356 05/04/20 
0321 05/03/20 
1719  136 137  
K 4.3 4.1 4.2  104 103 CO2 25 26 27 BUN 21* 24* 26* CREA 1.51* 1.90* 1.91* GLU 89 93 103* CA 9.1 8.4* 8.8 MG  --   --  2.6* Recent Labs 05/04/20 
0321 05/03/20 
1719 05/03/20 
0533 WBC 8.9 8.2 8.5 HGB 8.0* 7.0* 7.4* HCT 26.4* 23.2* 25.2*  
* 427* 462* No results for input(s): FE, TIBC, PSAT, FERR in the last 72 hours. Recent Labs 05/04/20 0321 CPK 80 Results for Susan Krishnan (MRN [id number]) as of 5/1/2020 16:17 Ref. Range 11/1/2013 15:35 8/11/2019 12:49 1/16/2020 18:40 1/17/2020 13:01 1/20/2020 08:30 3/10/2020 10:10 3/12/2020 13:12 4/21/2020 22:38 4/25/2020 00:53 4/26/2020 13:16 4/29/2020 16:10 4/30/2020 20:32 Vancomycin,trough Latest Ref Range: 5.0 - 10.0 ug/mL 25.8 (HH) 19.4 (H) 20.7 (HH)  24.9 (HH) 19.6 (H) 25.0 (HH) 20.6 (HH) 20.9 (HH) 20.8 (HH) 25.9 (HH) Vancomycin, random Latest Units: UG/ML    23.8        23.7 Lab Results Component Value Date/Time Color YELLOW/STRAW 03/08/2020 05:00 PM  
 Appearance CLOUDY (A) 03/08/2020 05:00 PM  
 Specific gravity 1.019 03/08/2020 05:00 PM  
 pH (UA) 5.0 03/08/2020 05:00 PM  
 Protein 100 (A) 03/08/2020 05:00 PM  
 Glucose 100 (A) 03/08/2020 05:00 PM  
 Ketone 15 (A) 03/08/2020 05:00 PM  
 Bilirubin NEGATIVE  03/08/2020 05:00 PM  
 Urobilinogen 0.2 03/08/2020 05:00 PM  
 Nitrites NEGATIVE  03/08/2020 05:00 PM  
 Leukocyte Esterase SMALL (A) 03/08/2020 05:00 PM  
 Epithelial cells FEW 03/08/2020 05:00 PM  
 Bacteria NEGATIVE  03/08/2020 05:00 PM  
 WBC 0-4 03/08/2020 05:00 PM  
 RBC 0-5 03/08/2020 05:00 PM  
 
Lab Results Component Value Date/Time Culture result: NO GROWTH 6 DAYS 04/21/2020 08:50 AM  
 Culture result: NO ANAEROBES ISOLATED 04/21/2020 07:20 AM  
 Culture result: LIGHT DIPHTHEROIDS (A) 04/21/2020 07:20 AM  
 
Lab Results Component Value Date/Time Specimen Description: RENEE 04/09/2014 12:27 PM  
 Specimen Description: RENEE 10/28/2013 05:00 PM  
 Specimen Description: [facility] 10/28/2013 03:09 PM  
 
Lab Results Component Value Date/Time Sodium,urine random 19 03/20/2020 01:20 PM  
 Creatinine, urine 125.00 03/20/2020 01:20 PM  
 
Results from YAYO TORO  JUAN Encounter encounter on 04/19/20 XR CHEST PORT Narrative Portable chest single view dated 4/20/2020 Comparison chest dated 4/19/2020 History is status post pacemaker change.  
 
A single portable AP upright view of the chest was obtained. The patient is 
slightly rotated towards the right. It is difficult to accurately assess the 
size of the cardiac silhouette. There has been interval removal of the cardiac 
pacer device and leads which were present on the left. A metallic wire projects 
over the upper portion of the right hemithorax. There is a suggestion of some 
increased density in the medial aspect of the upper portion of the right 
hemithorax. This may be associated with the brachiocephalic vasculature. A 
follow-up, true frontal examination of the chest may prove useful. Surgical 
clips project over the right lung base. The costophrenic angles are sharp in 
appearance. There is evidence of postsurgical change involving the cervical 
spine. Impression IMPRESSION: 
1. Interval removal of the cardiac pacer device and leads as described above. 2. Presence of a radiopaque wire which projects over the upper portion of the 
right hemithorax. Medication list  reviewed Current Facility-Administered Medications Medication  0.9% sodium chloride infusion  fentaNYL citrate (PF) injection 100 mcg  midazolam (VERSED) injection 5 mg  
 0.9% sodium chloride infusion 250 mL  WARFARIN INFORMATION NOTE (COUMADIN)  traMADoL (ULTRAM) tablet 50 mg  
 gabapentin (NEURONTIN) capsule 100 mg  polyethylene glycol (MIRALAX) packet 17 g  
 docusate sodium (COLACE) capsule 100 mg  
 alcohol 62% (NOZIN) nasal  1 Ampule  epoetin viet-epbx (RETACRIT) injection 20,000 Units  DAPTOmycin (CUBICIN) 900 mg in 0.9% sodium chloride 50 mL IVPB  oxyCODONE-acetaminophen (PERCOCET) 5-325 mg per tablet 1 Tab  hydrALAZINE (APRESOLINE) 20 mg/mL injection 10 mg  
 insulin glargine (LANTUS) injection 10 Units  [Held by provider] bumetanide (BUMEX) tablet 2 mg  busPIRone (BUSPAR) tablet 10 mg  
 sertraline (ZOLOFT) tablet 50 mg  
 glucose chewable tablet 16 g  
 dextrose (D50W) injection syrg 12.5-25 g  glucagon (GLUCAGEN) injection 1 mg  
 insulin lispro (HUMALOG) injection  sodium chloride (NS) flush 5-10 mL Jailene Rosales MD 
[facility] 
 www. [email or web address] [facility] [place] / [place] [address], [address] [place], [address] Phone - [phone] Fax - [phone]

## 2020-05-05 NOTE — PROGRESS NOTES
Infectious Disease Progress IMPRESSION:  
 
 
- MSSA Bacteremia & R/sided endocarditis 
  with pacemaker lead infection BC - 4/19-  MSSA - 1/1 - LAC BC-  4/19-  MSSA - 1/1 - RAC BC-  4/21-  NG 
  -S/p removal of dual lead Pacemaker on 4/27 MIKE - mobile vegetation on pacer lead Initial lead extraction attempted unsuccessful on 4/24 
   - Cellulitis of LLE , recurrent, s/p BKA ( 5/1) Previous admissions for same - 3/8-3/22, 1/14-1/20 RLE cellulitis - 12/6-12/9/19 
 - Diabetic foot ulcer , OM of foot plan for BKA in am 
  CT L/foot - no definite evidence of OM, D/w Dr Dorothy Merrill, gross pus ++ in wound WC - 4/20- Light  MSSA,few Strep Gp G beta hemolytic, Anaerobic -4/21-Light Diphtheroids Previous WC - 3/10- MSSA , Proteus mirabilis Xray - 
: Soft tissue wound at the level of the third through fifth proximal 
metatarsals. Underlying cortical irregularity and lucency within the proximal 
third through fifth metatarsals may represent osteomyelitis. - PARAG on CKD 4 Cr increase to 1.51 
-Atial firillation - ESR / CRP 85/14.4  
- Poor dentition 
- Diabetes mellitus with neuropathy A1c 7.5 
- Penicillin allergy - hives PLAN:  
  
 -  Daptomycin 900 mg IV daily end date 6/11, remove line at end of therapy 
  -- Weekly CBC, CMP, CK  & ESR/ CRP every other week  Fax reports to [phone], call with abnormal results at [phone]. Call with antibiotic related adverse events. - Encourage probiotic, yogurt 
   -Out pt ID follow up [facility] on 5/19 at 3 pm 
  - Blood sugar control - Plan of care D/w pt again Subjective:  
 
Pt seen . Awake , feels better overall. Phantom pain less. Patient Active Problem List  
Diagnosis Code  History of breast cancer Z85.3  Asthma J45.909  Fe deficiency anemia D50.9  Status post ablation of atrial fibrillation Z98.890, Z86.79  
 Sick sinus syndrome (HCC) I49.5  S/P cardiac pacemaker procedure Z95.0  Long term (current) use of anticoagulants Z79.01  
 Mixed hyperlipidemia E78.2  CKD (chronic kidney disease), stage IV ([facility]) N18.4  Systolic murmur G45.6  Essential hypertension I10  
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  
 Diabetic ulcer of right midfoot associated with type 2 diabetes mellitus, with fat layer exposed ([address] E11.621, J53.291  Foot deformity, acquired, left M21.962  Foot deformity, right M21.961  Pes cavus Q66.70  Type 2 diabetes mellitus with left diabetic foot infection ([facility]) E11.628, L08.9  Traumatic hematoma of foot, left, initial encounter B00.26RW  Diabetic ulcer of left midfoot with necrosis of muscle ([address] E11.621, D48.871  Left leg cellulitis [address]  Atrial fibrillation with RVR ([facility]) I48.91  
 H/O bilateral mastectomy Z90.13  Sepsis ([address] A41.9 Past Medical History:  
Diagnosis Date  Acquired lymphedema Right arm  Arrhythmia  Asthma  Atrial fibrillation ([address] Dr. Griffin Gomez and Dr. Dory Hernandez [facility])  Cancer ([address] bilateral breast  
 Chronic kidney disease  Diabetes mellitus type II   
 Dizziness  Essential hypertension  Hyperlipidemia  Hypertension  Long term (current) use of anticoagulants  Morbid obesity ([facility] 3/14/2012  Nausea & vomiting  Neuropathy  Osteoarthritis  Pacemaker  Sick sinus syndrome ([facility] Family History Problem Relation Age of Onset  Cancer Mother  Heart Disease Mother  Alcohol abuse Father  Diabetes Brother  Hypertension Brother Social History Tobacco Use  Smoking status: Never Smoker  Smokeless tobacco: Never Used Substance Use Topics  Alcohol use: Yes Comment: rare Past Surgical History:  
Procedure Laterality Date  ABDOMEN SURGERY PROC UNLISTED    
 gastric bypass  GASTRIC BYPASS,OBESE<150CM SAW-EN-Y  7/08  
 [facility]cher  HX AFIB ABLATION    
 HX BREAST RECONSTRUCTION Bilateral   
 HX CARPAL TUNNEL RELEASE [address] [facility] [address] [facility] [address] [facility] RIGHT MODIFIED RADICAL   
 HX MASTECTOMY Left   
 no lymphnode removal  
 HX MOHS PROCEDURES  1995  
 right  HX ORTHOPAEDIC Right   
 knee surgery  HX PACEMAKER    
 HX PACEMAKER    
 IR INSERT TUNL CVC W PORT OVER 5 YEARS    
 IR INSERT TUNL CVC W/O PORT OVER 5 YR  5/4/2020  [address]  8.21.07  
 CT [address] AND 1994  CT RELOCATION OF SKIN POCKET FOR PACEMAKER N/A 4/24/2020 RELOCATE 2 [facility] performed by Judy Ross MD at [address] [place] [address] CATH LAB  CT RMVL TRANSVNS PM ELTRD 1 LEAD SYS ATR/VENTR N/A 4/24/2020 Remove Pacemaker Dual Leads performed by Judy Ross MD at [facility] CATH LAB  CT RMVL TRANSVNS PM ELTRD 1 LEAD SYS ATR/VENTR N/A 4/27/2020 REMOVE PACEMAKER DUAL LEADS performed by Judy Ross MD at [address] CATH LAB  CT TRABECULOPLASTY BY LASER SURGERY    
 US GUIDE ASP OVARIAN CYST ABD APPROACH  8.21.07  
 RIGHT Prior to Admission medications Medication Sig Start Date End Date Taking? Authorizing Provider  
metOLazone (ZAROXOLYN) 2.5 mg tablet Take 1 tablet po on Mon and Thurs for worsening leg swelling. 4/13/20  Yes Larry Teran MD  
warfarin (COUMADIN) 4 mg tablet Take 1 tablet on Tues and Sat and a half tablet the other 5 days. 4/10/20  Yes Larry Teran MD  
bumetanide (BUMEX) 1 mg tablet Take 2 Tabs by mouth daily. 3/26/20  Yes Larry Teran MD  
insulin glargine (Lantus U-100 Insulin) 100 unit/mL injection Inject 14 units daily 3/26/20  Yes Larry Teran MD  
metoprolol succinate (TOPROL-XL) 100 mg tablet Take 2 Tabs by mouth daily. 3/26/20  Yes Larry Teran MD  
dilTIAZem CD (CARDIZEM CD) 180 mg ER capsule Take 1 Cap by mouth daily. 3/22/20  Yes Huyen Lyon MD  
hydrALAZINE (APRESOLINE) 50 mg tablet Take 1 Tab by mouth three (3) times daily. 3/22/20  Yes Huyen Lyon MD  
acetaminophen 500 mg tab 500 mg, diphenhydrAMINE 25 mg cap 25 mg Take 2 Doses by mouth nightly. Yes Provider, Historical  
sertraline (ZOLOFT) 50 mg tablet TAKE ONE AND ONE-HALF (1 & 1/2) TABLET BY MOUTH DAILY 8/22/19  Yes Larry Teran MD  
doxazosin (CARDURA) 4 mg tablet TAKE ONE TABLET BY MOUTH ONCE NIGHTLY 6/14/19  Yes Larry Teran MD  
busPIRone (BUSPAR) 10 mg tablet TAKE ONE TABLET BY MOUTH TWICE A DAY 5/24/19  Yes Larry Teran MD  
cholecalciferol, VITAMIN D3, (VITAMIN D3) 5,000 unit tab tablet Take 5,000 Units by mouth daily. Yes Provider, Historical  
vitamin A 8,000 unit capsule Take 8,000 Units by mouth daily. Yes Provider, Historical  
insulin lispro (HUMALOG) 100 unit/mL kwikpen 2 units with dinner for sugars over 200 1/3/14  Yes Larry Teran MD  
cyanocobalamin (VITAMIN B-12) 1,000 mcg sublingual tablet Take 1,000 mcg by mouth daily. Yes Provider, Historical  
 
Allergies Allergen Reactions  Ace Inhibitors Cough  Amlodipine Swelling  Avapro [Irbesartan] Unable to Obtain  Bactrim [Sulfamethoxazole-Trimethoprim] Other (comments) Sore mouth  Captopril Cough  Carvedilol Diarrhea  Contrast Agent [Iodine] Itching [address]  Morphine Nausea and Vomiting and Swelling  Penicillins Hives Did not tolerate cefepime 3/2020  Pravachol [Pravastatin] Nausea Only  Seafood [Shellfish Containing Products] Hives  Singulair [Montelukast] Other (comments)  
  palpitations Review of Systems:  A comprehensive review of systems was negative except for that written in the History of Present Illness. 10 point review of systems obtained . All other systems negative Objective:  
Blood pressure 144/66, pulse 75, temperature 97.8 °F (36.6 °C), resp. rate 16, height 5' 10\" (1.778 m), weight 238 lb 15.7 oz (108.4 kg), SpO2 100 %, not currently breastfeeding. Temp (24hrs), Av.1 °F (36.7 °C), Min:97.6 °F (36.4 °C), Max:98.6 °F (37 °C) Current Facility-Administered Medications Medication Dose Route Frequency  warfarin (COUMADIN) tablet 7.5 mg  7.5 mg Oral ONCE  
 0.9% sodium chloride infusion  25 mL/hr IntraVENous CONTINUOUS  
 fentaNYL citrate (PF) injection 100 mcg  100 mcg IntraVENous Multiple  midazolam (VERSED) injection 5 mg  5 mg IntraVENous Multiple  0.9% sodium chloride infusion 250 mL  250 mL IntraVENous PRN  
 WARFARIN INFORMATION NOTE (COUMADIN)   Other QPM  
 traMADoL (ULTRAM) tablet 50 mg  50 mg Oral Q6H PRN  
 gabapentin (NEURONTIN) capsule 100 mg  100 mg Oral TID  polyethylene glycol (MIRALAX) packet 17 g  17 g Oral BID  docusate sodium (COLACE) capsule 100 mg  100 mg Oral BID  alcohol 62% (NOZIN) nasal  1 Ampule  1 Ampule Topical Q12H  
 epoetin viet-epbx (RETACRIT) injection 20,000 Units  20,000 Units SubCUTAneous Q MON, WED & FRI  DAPTOmycin (CUBICIN) 900 mg in 0.9% sodium chloride 50 mL IVPB  900 mg IntraVENous Q24H  
 oxyCODONE-acetaminophen (PERCOCET) 5-325 mg per tablet 1 Tab  1 Tab Oral Q4H PRN  
 hydrALAZINE (APRESOLINE) 20 mg/mL injection 10 mg  10 mg IntraVENous Q6H PRN  
 insulin glargine (LANTUS) injection 10 Units  10 Units SubCUTAneous DAILY  [Held by provider] bumetanide (BUMEX) tablet 2 mg  2 mg Oral DAILY  busPIRone (BUSPAR) tablet 10 mg  10 mg Oral BID  sertraline (ZOLOFT) tablet 50 mg  50 mg Oral DAILY  glucose chewable tablet 16 g  4 Tab Oral PRN  
 dextrose (D50W) injection syrg 12.5-25 g  25-50 mL IntraVENous PRN  
 glucagon (GLUCAGEN) injection 1 mg  1 mg IntraMUSCular PRN  
 insulin lispro (HUMALOG) injection   SubCUTAneous AC&HS  sodium chloride (NS) flush 5-10 mL  5-10 mL IntraVENous PRN Exam:   
General:  Awake cooperative, Eyes:  Sclera anicteric. Pupils equally round and reactive to light. Mouth/Throat: Mucous membranes normal, oral pharynx clear Neck: Supple Lungs:   Clear to auscultation CV:  Regular rate and rhythm,no murmur, click, rub or gallop Abdomen:   Soft, non-tender. bowel sounds normal. non-distended Extremities: No cyanosis or edema Skin: Skin color, texture, turgor normal. no acute rash or lesions Lymph nodes: Cervical and supraclavicular normal  
Musculoskeletal: LLE BKA Lines/Devices:  Intact, no erythema, drainage or tenderness Psych: Resting , cannot assess Data Review: CBC:  
Recent Labs 05/04/20 
0321 05/03/20 
1719 05/03/20 
0533 WBC 8.9 8.2 8.5  
RBC 2.77* 2.43* 2.65* HGB 8.0* 7.0* 7.4* HCT 26.4* 23.2* 25.2*  
* 427* 462* GRANS 68  --  71  
LYMPH 18  --  16  
EOS 2  --  1 CMP:  
Recent Labs 05/05/20 
0356 05/04/20 
0321 05/03/20 
1719 GLU 89 93 103*  136 137  
K 4.3 4.1 4.2  104 103 CO2 25 26 27 BUN 21* 24* 26* CREA 1.51* 1.90* 1.91* CA 9.1 8.4* 8.8 AGAP 7 6 7 BUCR 14 13 14 Lab Results Component Value Date/Time  Culture result: NO GROWTH 6 DAYS 04/21/2020 08:50 AM  
 Culture result: NO ANAEROBES ISOLATED 04/21/2020 07:20 AM  
 Culture result: LIGHT DIPHTHEROIDS (A) 04/21/2020 07:20 AM  
 Culture result: LIGHT STAPHYLOCOCCUS AUREUS (A) 04/20/2020 05:48 PM  
 Culture result: (A) 04/20/2020 05:48 PM  
  FEW STREPTOCOCCI, BETA HEMOLYTIC GROUP G Penicillin and ampicillin are drugs of choice for treatment of beta-hemolytic streptococcal infections. Susceptibility testing of penicillins and beta-lactams approved by the FDA for treatment of beta-hemolytic streptococcal infections need not be performed routinely, because nonsusceptible isolates are extremely rare. CLSI 2012 XR Results (most recent): 
Results from Hospital Encounter encounter on 04/19/20 XR CHEST PORT Narrative Portable chest single view dated 4/20/2020 Comparison chest dated 4/19/2020 History is status post pacemaker change. A single portable AP upright view of the chest was obtained. The patient is 
slightly rotated towards the right. It is difficult to accurately assess the 
size of the cardiac silhouette. There has been interval removal of the cardiac 
pacer device and leads which were present on the left. A metallic wire projects 
over the upper portion of the right hemithorax. There is a suggestion of some 
increased density in the medial aspect of the upper portion of the right 
hemithorax. This may be associated with the brachiocephalic vasculature. A 
follow-up, true frontal examination of the chest may prove useful. Surgical 
clips project over the right lung base. The costophrenic angles are sharp in 
appearance. There is evidence of postsurgical change involving the cervical 
spine. Impression IMPRESSION: 
1. Interval removal of the cardiac pacer device and leads as described above. 2. Presence of a radiopaque wire which projects over the upper portion of the 
right hemithorax. ICD-10-CM ICD-9-CM 1. Ulcer of left foot, unspecified ulcer stage ([facility] L97.529 707.15   
2. Osteomyelitis of left foot, unspecified type ([facility] M86.9 730.27 3.  Cellulitis of left lower extremity L03.116 682.6 4. Disorder of cardiac pacemaker system, subsequent encounter T82. 9XXD V58.89 ELECTROPHYSIOLOGY PROCEDURE  
  996.72 ELECTROPHYSIOLOGY PROCEDURE  
   ELECTROPHYSIOLOGY PROCEDURE  
   ELECTROPHYSIOLOGY PROCEDURE  
   CANCELED: INVASIVE VASCULAR PROCEDURE  
   CANCELED: INVASIVE VASCULAR PROCEDURE Antibotic History Daptomycin IV - 5/1 
 s/p Vancomycin IV I have discussed the diagnosis with the patient and the intended plan as seen in the above orders. I have discussed medication side effects and warnings with the patient as well. Reviewed test results at length with patient Signed By: Tejas Law MD FACP

## 2020-05-05 NOTE — PROGRESS NOTES
Problem: Falls - Risk of 
Goal: *Absence of Falls Description: Document Bishop Adhikari Fall Risk and appropriate interventions in the flowsheet. Outcome: Progressing Towards Goal 
Note: Fall Risk Interventions: 
Mobility Interventions: Communicate number of staff needed for ambulation/transfer Medication Interventions: Teach patient to arise slowly, Evaluate medications/consider consulting pharmacy Elimination Interventions: Call light in reach, Toileting schedule/hourly rounds Problem: Pressure Injury - Risk of 
Goal: *Prevention of pressure injury Description: Document Valdemar Scale and appropriate interventions in the flowsheet. Outcome: Progressing Towards Goal 
Note: Pressure Injury Interventions: 
Sensory Interventions: Assess changes in LOC Moisture Interventions: Absorbent underpads, Internal/External urinary devices Activity Interventions: Pressure redistribution bed/mattress(bed type), PT/OT evaluation Mobility Interventions: HOB 30 degrees or less, Pressure redistribution bed/mattress (bed type), PT/OT evaluation Nutrition Interventions: Offer support with meals,snacks and hydration Friction and Shear Interventions: Lift sheet, Minimize layers Problem: Pain Goal: *Control of Pain Outcome: Progressing Towards Goal 
  
Bedside and Verbal shift change report given to Breezy Dolan (oncoming nurse) by Surinder Garay (offgoing nurse). Report included the following information SBAR, OR Summary, Intake/Output, MAR, Recent Results and Cardiac Rhythm sinus.

## 2020-05-05 NOTE — PROGRESS NOTES
BEDSIDE shift change report GIVEN TO Sylvie Emanuel RN. Report included the following information SBAR, Kardex, MAR and Recent Results. Dual skin assessment performed with Sylvie Emanuel RN. 
 
 
SIGNIFICANT CHANGES DURING SHIFT:  Patient had [facility] catheter insertion RU chest.   
 
 
CONCERNS TO ADDRESS WITH MD: None

## 2020-05-05 NOTE — PROGRESS NOTES
Hospitalist Progress Note NAME: Cristy Wong :  1960 MRN:  [id number] Sepsis secondary to left foot cellulitis and OM left metatarsals 3,4,and 5 S/p Left BKA 2020 by Dr. Tomás Espinosa - appreciate podiatrist's input; concerned with prolonged IV ABX tmt. Dr. Ruben Rogers will discuss with Vascular service on either a BKA vs Chopart's amputation of left side CT of lower extremity: Large soft tissue ulcer at the base of the fifth metatarsal. No definite evidence of osteomyelitis Post op care per vascular surgery team  
  Pain management challenging; in and out of Atrial fib with bradycardia and hypotension. Continue with Neurontin in hope to control phantom pain 
  Continue with percocet with ultram for breakthrough pain No IV pain medication at this time. Pt is tolerating PO without difficulty. Bacteremia with MSSA Endocarditis  
- continue with ID team's input; duration of the therapy, 6 weeks from  from the surgery date BC ()  MSSA -  - LAC 
  BC ()  MSSA -  - RAC 
  BC ()  NG 
  WC () Light  MSSA,few Strep Gp G beta hemolytic ECHO - possible vegetation on AV Pacemaker present - no swelling , erythema MIKE - mobile vegetation on pacer lead; the lead has been extracted on  IV vanc was changed IV Daptomycin due to worsening of creat Weekly CBC, CMP & ESR/ CRP every other week  Fax reports to [phone], call with abnormal results at [phone]. 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 Ricarda Allen 
Anemia of chronic disease Transfuse PRN if hgb <7.0 Atrial fibrillation Hypercoagulable state secondary to afib HTN 
SSS 
- In and out of A-fib with intermittent bradycardia; will have cardiology to review the case for recommendation; last seen by cardiologist team on     Continue Cardizem, doxazosin, hydralazine HR down to 40-50's at times, BB has been d/c'd 
   Coumadin per pharmacy; daily INR 
  
PARAG on CKD stage IV 
- creat trending down 1.9-->1.51 Start on gentle IV hydration Avoid nephrotoxic agents. Appreciate nephrology input; 
  
Type 2 DM with hyperglycemia 
- Hgb A1c 7.5. continue with lantus Check qac/qhs blood glucose and follow SSI 
  
Depression and anxiety 
- Continue sertraline and buspirone  
  
30.0 - 39.9 Obese / Body mass index is 34.87 kg/m². Code status: Full Prophylaxis: SCD's Recommended Disposition: Rehab Subjective: Chief Complaint / Reason for Physician Visit Sepsis Endocarditis \"I am ok just want to go home\" Discussed with RN events overnight. Review of Systems: 
Symptom Y/N Comments  Symptom Y/N Comments Fever/Chills n   Chest Pain n   
Poor Appetite    Edema Cough    Abdominal Pain Sputum    Joint Pain SOB/CHOW n   Pruritis/Rash Nausea/vomit n   Tolerating PT/OT Diarrhea n   Tolerating Diet Constipation n   Other Could NOT obtain due to:   
 
Objective: VITALS:  
Last 24hrs VS reviewed since prior progress note. Most recent are: 
Patient Vitals for the past 24 hrs: 
 Temp Pulse Resp BP SpO2  
05/05/20 1132 98.6 °F (37 °C) 70 18 141/64 95 % 05/05/20 0742 97.9 °F (36.6 °C) 70 18 143/58 96 % 05/05/20 0346 98.1 °F (36.7 °C) 67 18 143/66 99 % 05/04/20 2328 98.5 °F (36.9 °C) 69 18 134/84 99 % 05/04/20 2007 97.6 °F (36.4 °C) 65 18 142/57 97 % 05/04/20 1625 97.6 °F (36.4 °C) 74 19 147/63 97 % 05/04/20 1557  69 17 150/68 100 % 05/04/20 1550  71 16 145/63 100 % 05/04/20 1546  71 17 143/49 99 % Intake/Output Summary (Last 24 hours) at 5/5/2020 1310 Last data filed at 5/5/2020 4195 Gross per 24 hour Intake 752.5 ml Output 2500 ml Net -1747.5 ml PHYSICAL EXAM: 
General: Ill appearing. Alert, cooperative, no acute distress EENT:  EOMI. Anicteric sclerae.  MMM Resp:  Clear in apex with decreased breath sounds at bases. no wheezing or rales. No accessory muscle use CV:  irregular  rhythm,  No edema GI:  Soft, Non distended, Non tender. +Bowel sounds Neurologic:  Alert and oriented X 3, normal speech, Psych:   Fair insight. Not anxious nor agitated Skin:  No rashes. No jaundice, left BKA site dressing dry and intact. Discoloration of right lower 1/3 leg to foot; no changes Reviewed most current lab test results and cultures  YES Reviewed most current radiology test results   YES Review and summation of old records today    NO Reviewed patient's current orders and MAR    YES 
PMH/SH reviewed - no change compared to H&P 
________________________________________________________________________ Care Plan discussed with: 
  Comments Patient y Family RN y   
Care Manager Consultant Multidiciplinary team rounds were held today with , nursing, pharmacist and clinical coordinator. Patient's plan of care was discussed; medications were reviewed and discharge planning was addressed. ________________________________________________________________________ Jewell Lawrence MD  
 
Procedures: see electronic medical records for all procedures/Xrays and details which were not copied into this note but were reviewed prior to creation of Plan. LABS: 
I reviewed today's most current labs and imaging studies. Pertinent labs include: 
Recent Labs 05/04/20 0321 05/03/20 1719 05/03/20 
0533 WBC 8.9 8.2 8.5 HGB 8.0* 7.0* 7.4* HCT 26.4* 23.2* 25.2*  
* 427* 462* Recent Labs 05/05/20 
2605 05/04/20 0321 05/03/20 1719 05/03/20 
0533  136 137 135* K 4.3 4.1 4.2 3.9  104 103 102 CO2 25 26 27 27 GLU 89 93 103* 94 BUN 21* 24* 26* 22* CREA 1.51* 1.90* 1.91* 1.87* CA 9.1 8.4* 8.8 8.7 MG  --   --  2.6*  --   
INR 1.4* 1.3*  --  1.2* Signed: Mati Irene MD

## 2020-05-05 NOTE — PROGRESS NOTES
Problem: Self Care Deficits Care Plan (Adult) Goal: *Acute Goals and Plan of Care (Insert Text) Description: FUNCTIONAL STATUS PRIOR TO ADMISSION: Patient lives with her , but was overall Mod I with basic self-care. She was recently hospitalized due to B foot wounds; now s/p L BKA. She was using a RW for transfers and ambulating short distances. She has a ramp; indicates that she sponge bathes. Her  was taking care of grocery shopping and driving. Patient reports he also assists with vacuuming and heaving cleaning since she has restrictions for using her dominant R UE (s/p history of breast cancer with mastectomy). HOME SUPPORT: Lives with her . Occupational Therapy Goals Initiated 5/2/2020 1. Patient will perform grooming standing at the sink with modified independence within 7 day(s). 2.  Patient will perform bathing with supervision/set-up within 7 day(s). 3.  Patient will perform lower body dressing with supervision/set-up within 7 day(s). 4.  Patient will perform toilet transfers with minimal assistance/contact guard assist within 7 day(s). 5.  Patient will perform all aspects of toileting with minimal assistance/contact guard assist within 7 day(s). 6.  Patient will participate in upper extremity therapeutic exercise/activities with Min cues for 10 minutes within 7 day(s). 7.  Patient will independently utilize energy conservation techniques during ADL within 7 day(s). Outcome: Progressing Towards Goal 
  
OCCUPATIONAL THERAPY TREATMENT Patient: Reyna Nguyen (57 y.o. female) Date: 5/5/2020 [facility] [A41.9] <principal problem not specified> Procedure(s) (LRB): LEFT BELOW KNEE AMPUTATION (Left) 4 Days Post-Op Precautions:  R limb alert Chart, occupational therapy assessment, plan of care, and goals were reviewed. ASSESSMENT Patient continues with skilled OT services and is progressing towards goals.  Pt receptive to bed-level BUE exercises this session. Due to pt concern about BUE weakness and need for increased upper-body weightbearing following L BKA, pt provided with yellow theraband and educated on a HEP. Pt able to demonstrate one set of exercises with visual demonstration and min verbal cues for technique. Pt very motivated and excited to work on strengthening throughout the day in prep for increased independence in functional mobility and ADLs. Current Level of Function Impacting Discharge (ADLs): set-up for seated ADLs Other factors to consider for discharge: lives with  PLAN : 
Patient continues to benefit from skilled intervention to address the above impairments. Continue treatment per established plan of care. to address goals. Recommend with staff: chair position in bed for all meals; bedpan for toileting; set-up for seated ADLs Recommend next OT session: UE exercise, bed mobility, toilet transfers to [facility] Recommendation for discharge: (in order for the patient to meet his/her long term goals) Therapy 3 hours per day 5-7 days per week This discharge recommendation: 
Has been made in collaboration with the attending provider and/or case management IF patient discharges home will need the following DME: TBD SUBJECTIVE:  
Patient stated \"I'm not as strong as I thought after trying the yellow theraband. OBJECTIVE DATA SUMMARY:  
Cognitive/Behavioral Status: 
Neurologic State: Alert; Appropriate for age Orientation Level: Oriented X4 Cognition: Appropriate for age attention/concentration; Appropriate decision making; Appropriate safety awareness; Follows commands Perception: Appears intact Perseveration: No perseveration noted Safety/Judgement: Home safety; Insight into deficits Functional Mobility and Transfers for ADLs: 
Bed Mobility: 
Rolling: Minimum assistance Supine to Sit: Stand-by assistance; Additional time;Bed Modified; Adaptive equipment Sit to Supine: Stand-by assistance Scooting: Stand-by assistance(while seated EOB) Transfers: 
Sit to Stand: (unable to clear buttocks--fearful of falling) Balance: 
Sitting: Intact Standing: (unable to achieve full standing) ADL Intervention: 
Feeding Feeding Assistance: Set-up; Supervision Drink to Mouth: Supervision Cognitive Retraining Safety/Judgement: Home safety; Insight into deficits Therapeutic Exercise: Pt provided with yellow theraband and educated on HEP to complete - 10 reps each, 3x/day. With visual demonstration and min verbal cues for technique, pt able to demonstrate one complete set of all exercises. 
-shoulder flexion/extension 
-elbow flexion/extension 
-shoulder abduction/adduction 
-internal rotation Pain: Pt with no reports of pain. Activity Tolerance:  
Good Please refer to the flowsheet for vital signs taken during this treatment. After treatment patient left in no apparent distress:  
Supine in bed, Bed / chair alarm activated, Side rails x 3 and RN in room COMMUNICATION/COLLABORATION:  
The patients plan of care was discussed with: Physical therapist and Registered nurse. Milagros Wilkinson OT Time Calculation: 12 mins

## 2020-05-05 NOTE — PROGRESS NOTES
Problem: Falls - Risk of 
Goal: *Absence of Falls Description: Document Serrato Maple Fall Risk and appropriate interventions in the flowsheet. Outcome: Progressing Towards Goal 
Note: Fall Risk Interventions: 
Mobility Interventions: Communicate number of staff needed for ambulation/transfer Medication Interventions: Evaluate medications/consider consulting pharmacy Elimination Interventions: Call light in reach Problem: Pressure Injury - Risk of 
Goal: *Prevention of pressure injury Description: Document Valdemar Scale and appropriate interventions in the flowsheet. Outcome: Progressing Towards Goal 
Note: Pressure Injury Interventions: 
Sensory Interventions: Assess changes in LOC Moisture Interventions: Absorbent underpads Activity Interventions: Pressure redistribution bed/mattress(bed type) Mobility Interventions: HOB 30 degrees or less Nutrition Interventions: Offer support with meals,snacks and hydration Friction and Shear Interventions: Lift sheet Problem: Diabetes Maintenance:Ongoing Goal: Activity/Safety Outcome: Progressing Towards Goal

## 2020-05-06 NOTE — PROGRESS NOTES
Problem: Self Care Deficits Care Plan (Adult) Goal: *Acute Goals and Plan of Care (Insert Text) Description: FUNCTIONAL STATUS PRIOR TO ADMISSION: Patient lives with her , but was overall Mod I with basic self-care. She was recently hospitalized due to B foot wounds; now s/p L BKA. She was using a RW for transfers and ambulating short distances. She has a ramp; indicates that she sponge bathes. Her  was taking care of grocery shopping and driving. Patient reports he also assists with vacuuming and heaving cleaning since she has restrictions for using her dominant R UE (s/p history of breast cancer with mastectomy). HOME SUPPORT: Lives with her . Occupational Therapy Goals Initiated 5/2/2020 1. Patient will perform grooming standing at the sink with modified independence within 7 day(s). 2.  Patient will perform bathing with supervision/set-up within 7 day(s). 3.  Patient will perform lower body dressing with supervision/set-up within 7 day(s). 4.  Patient will perform toilet transfers with minimal assistance/contact guard assist within 7 day(s). 5.  Patient will perform all aspects of toileting with minimal assistance/contact guard assist within 7 day(s). 6.  Patient will participate in upper extremity therapeutic exercise/activities with Min cues for 10 minutes within 7 day(s). 7.  Patient will independently utilize energy conservation techniques during ADL within 7 day(s). Outcome: Progressing Towards Goal 
OCCUPATIONAL THERAPY TREATMENT Patient: Lenell Comas (73 y.o. female) Date: 5/6/2020 Diagnosis: Sepsis ([email or web address] [A41.9] <principal problem not specified> Procedure(s) (LRB): LEFT BELOW KNEE AMPUTATION (Left) 5 Days Post-Op Precautions:   
Chart, occupational therapy assessment, plan of care, and goals were reviewed. ASSESSMENT Patient continues with skilled OT services and is progressing towards goals.   Pt was motivated to participate and reported that she normally does not like therapy but she understands the importance of working with therapy to regain her independence. She was very motivated to participate. She was unable to achieve sit to stand today due to weakness. She was able to clear buttocks with assist and scoot up EOB. Good dynamic balance EOB. Pt has been exercising on her own with T-band issued by therapy. She remain appropriate for inpatient rehab at discharge. Current Level of Function Impacting Discharge (ADLs): focused on sit to stand attempts, UE strengthing and scooting EOB for increased independence with ADls. Unable to achieve standing mod/max assist to clear buttocks but was able to scoot EOB with min assist for residual limb only Other factors to consider for discharge: was independent without assist devices prior to admit PLAN : 
Patient continues to benefit from skilled intervention to address the above impairments. Continue treatment per established plan of care. to address goals. Recommend with staff: allow pt to sit EOB for eating Recommend next OT session: UE strength, sit to stand, scooting and lower body ADLs Recommendation for discharge: (in order for the patient to meet his/her long term goals) Therapy 3 hours per day 5-7 days per week This discharge recommendation: 
Has been made in collaboration with the attending provider and/or case management SUBJECTIVE:  
Patient stated I normally don't like therapy, but I know I need to do this to be independent. I will do whatever you need me to do.  OBJECTIVE DATA SUMMARY:  
Cognitive/Behavioral Status: 
Neurologic State: Alert Orientation Level: Oriented X4 Cognition: Follows commands; Appropriate decision making Perception: Appears intact Perseveration: No perseveration noted Safety/Judgement: Awareness of environment; Fall prevention Functional Mobility and Transfers for ADLs: 
Bed Mobility: 
Rolling: Minimum assistance Supine to Sit: Stand-by assistance Sit to Supine: Minimum assistance Scooting: Minimum assistance(managing LLE scooting up HOB) Transfers: 
Sit to Stand: (barely able to clear buttocks, unable to come to standing) Balance: 
Sitting: Intact ADL Intervention: Lower Body Dressing Assistance Dressing Assistance: Total assistance(dependent)(limb gaurd) Cognitive Retraining Safety/Judgement: Awareness of environment; Fall prevention Therapeutic Exercises:  
Seated EOB clearing buttocks from surface with mod/max assist x2 unable to come to standing 5 trials Scooting up [facility] with min assist to manage LLE and verbal/physical cues for correct placement of LLE With [facility] flat pt was able to pull up to long sitting with increased effort and time using bilateral bed rails with SBA Rolling left and right in bed with min assist 
Pain: 
6/10 residual limb but did not report phantom limb pain Activity Tolerance:  
Good Please refer to the flowsheet for vital signs taken during this treatment. After treatment patient left in no apparent distress:  
Supine in bed, Call bell within reach, and Caregiver / family present COMMUNICATION/COLLABORATION:  
The patients plan of care was discussed with: Physical therapist, Registered nurse, and patient . Daily Nieto OTR/L Time Calculation: 27 mins

## 2020-05-06 NOTE — PROGRESS NOTES
Hospitalist Progress Note NAME: Josie Grissom :  1960 MRN:  [id number] Sepsis secondary to left foot cellulitis and OM left metatarsals 3,4,and 5 S/p Left BKA 2020 by Dr. Kaylah Victoria - appreciate podiatrist's input; concerned with prolonged IV ABX tmt. Dr. Stas Pandey will discuss with Vascular service on either a BKA vs Chopart's amputation of left side CT of lower extremity: Large soft tissue ulcer at the base of the fifth metatarsal. No definite evidence of osteomyelitis Post op care per vascular surgery team  
  Pain management challenging; in and out of Atrial fib with bradycardia and hypotension. Continue with Neurontin in hope to control phantom pain 
  Continue with percocet with ultram for breakthrough pain No IV pain medication at this time. Pt is tolerating PO without difficulty. Awaiting insurance approval for placement. Bacteremia with MSSA Endocarditis  
- continue with ID team's input; duration of the therapy, 6 weeks from  from the surgery date BC ()  MSSA -  - LAC 
  BC ()  MSSA -  - RAC 
  BC ()  NG 
  WC () Light  MSSA,few Strep Gp G beta hemolytic ECHO - possible vegetation on AV Pacemaker present - no swelling , erythema MIKE - mobile vegetation on pacer lead; the lead has been extracted on  IV vanc was changed IV Daptomycin due to worsening of creat Weekly CBC, CMP & ESR/ CRP every other week  Fax reports to [phone], call with abnormal results at [phone]. 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 Niels Guzman 
Anemia of chronic disease Transfuse PRN if hgb <7.0 Atrial fibrillation Hypercoagulable state secondary to afib HTN 
SSS 
- In and out of A-fib with intermittent bradycardia; will have cardiology to review the case for recommendation; last seen by cardiologist team on 4/29 Continue Cardizem, doxazosin, hydralazine HR down to 40-50's at times, BB has been d/c'd 
   Coumadin per pharmacy; daily INR 
  
PARAG on CKD stage IV 
- creat trending down 1.9-->1.51 Start on gentle IV hydration Avoid nephrotoxic agents. Appreciate nephrology input; 
  
Type 2 DM with hyperglycemia 
- Hgb A1c 7.5. continue with lantus Check qac/qhs blood glucose and follow SSI 
  
Depression and anxiety 
- Continue sertraline and buspirone  
  
30.0 - 39.9 Obese / Body mass index is 34.87 kg/m². Code status: Full Prophylaxis: SCD's Recommended Disposition: Rehab Subjective: Chief Complaint / Reason for Physician Visit Sepsis Endocarditis \"I alright but I really want to leave\" Discussed with RN events overnight. Review of Systems: 
Symptom Y/N Comments  Symptom Y/N Comments Fever/Chills n   Chest Pain n   
Poor Appetite    Edema Cough    Abdominal Pain Sputum    Joint Pain SOB/CHOW n   Pruritis/Rash Nausea/vomit n   Tolerating PT/OT Diarrhea n   Tolerating Diet Constipation n   Other Could NOT obtain due to:   
 
Objective: VITALS:  
Last 24hrs VS reviewed since prior progress note. Most recent are: 
Patient Vitals for the past 24 hrs: 
 Temp Pulse Resp BP SpO2  
05/06/20 1302  82  161/68   
05/06/20 1043 98.2 °F (36.8 °C) 79 16 157/72 98 % 05/06/20 0716 98 °F (36.7 °C) 79 14 171/66 97 % 05/06/20 0414 98.1 °F (36.7 °C) 72 14 164/71 94 % 05/05/20 2322 97.9 °F (36.6 °C) 76 18 147/63 97 % 05/05/20 1952 98.4 °F (36.9 °C) 71 18 141/58 96 % 05/05/20 1624 97.8 °F (36.6 °C) 75 16 144/66 100 % Intake/Output Summary (Last 24 hours) at 5/6/2020 1357 Last data filed at 5/6/2020 1100 Gross per 24 hour Intake 499.17 ml Output 650 ml Net -150.83 ml PHYSICAL EXAM: 
General: Ill appearing. Alert, cooperative, no acute distress EENT:  EOMI. Anicteric sclerae. MMM Resp:  Clear in apex with decreased breath sounds at bases. no wheezing or rales. No accessory muscle use CV:  irregular  rhythm,  No edema GI:  Soft, Non distended, Non tender. +Bowel sounds Neurologic:  Alert and oriented X 3, normal speech, Psych:   Fair insight. Not anxious nor agitated Skin:  No rashes. No jaundice, left BKA site dressing dry and intact. Discoloration of right lower 1/3 leg to foot; no changes Reviewed most current lab test results and cultures  YES Reviewed most current radiology test results   YES Review and summation of old records today    NO Reviewed patient's current orders and MAR    YES 
PMH/SH reviewed - no change compared to H&P 
________________________________________________________________________ Care Plan discussed with: 
  Comments Patient y Family RN y   
Care Manager Consultant Multidiciplinary team rounds were held today with , nursing, pharmacist and clinical coordinator. Patient's plan of care was discussed; medications were reviewed and discharge planning was addressed. ________________________________________________________________________ Nicki Gonzales MD  
 
Procedures: see electronic medical records for all procedures/Xrays and details which were not copied into this note but were reviewed prior to creation of Plan. LABS: 
I reviewed today's most current labs and imaging studies. Pertinent labs include: 
Recent Labs 05/06/20 
4136 05/04/20 
0321 05/03/20 
1719 WBC 6.8 8.9 8.2 HGB 8.3* 8.0* 7.0*  
HCT 27.5* 26.4* 23.2*  
* 427* 427* Recent Labs 05/06/20 
8340 05/05/20 
4997 05/04/20 
0321 05/03/20 
1719  139 136 137  
K 4.3 4.3 4.1 4.2 * 107 104 103 CO2 28 25 26 27 GLU 79 89 93 103* BUN 20 21* 24* 26* CREA 1.62* 1.51* 1.90* 1.91* CA 8.7 9.1 8.4* 8.8 MG  --   --   --  2.6* INR 1.8* 1.4* 1.3*  --   
 
 
Signed: )Nicki Gonzales, MD

## 2020-05-06 NOTE — PROGRESS NOTES
Nephrology Progress Note [facility] 
www. [facility]                  Phone - [phone] Patient: Priya Sarabia Date- 5/6/2020 Admit Date: 4/19/2020 YOB: 1960 CC: Follow up for nela on ckd Subjective: Interval History:  
- 5/6/2020 Cr. Stable No fever 
bp on high side 5/4/2020 CR stable at 1.9 
bp on lowside Bradycardia over the weekend 5/1/2020 Ms. Wharton has pmh of ckd, htn, dm. She is admitted with left foot cellulitis,. She is dx with MSSA sepsis. She has undergone LEFT BKA today She vegetation on pacemaker leads- pace maker removed on 4- She has developed nela with cr. 1.7 today She is on vanco. Her vanco level was 25.9 on 4- Her bp has been on low side. Her cr. Level has been higher in past 
 
Date. ..------. ....      . cr 
5-1-2020----------1.69 
4--------1.47 
4.22.2020---    ---1.52 
4---    --1.95 
Cr. Trends Ref. Range 1/20/2020 04:07 3/8/2020 11:25 3/9/2020 04:30 3/10/2020 01:58 3/11/2020 03:58 3/12/2020 03:54 3/13/2020 04:10 3/14/2020 02:22 3/15/2020 04:12 3/16/2020 00:53 3/17/2020 00:55 3/18/2020 04:01 3/19/2020 01:35 3/20/2020 00:52 3/21/2020 05:06 3/22/2020 03:27 4/19/2020 21:53 4/21/2020 05:33 4/22/2020 01:17 4/23/2020 02:21 4/24/2020 04:09 4/25/2020 00:53 4/26/2020 03:37 4/27/2020 03:04 4/28/2020 04:57 4/29/2020 04:48 4/30/2020 02:07 5/1/2020 04:12 Creatinine Latest Ref Range: 0.55 - 1.02 MG/DL 2.12 (H) 2.17 (H) 1.92 (H) 1.88 (H) 1.91 (H) 1.88 (H) 1.86 (H) 1.95 (H) 2.06 (H) 2.19 (H) 2.41 (H) 2.38 (H) 2.51 (H) 2.48 (H) 2.38 (H) 2.12 (H) 1.95 (H) 1.89 (H) 1.68 (H) 1.54 (H) 1.52 (H) 1.38 (H) 1.46 (H) 1.34 (H) 1.35 (H) 1.45 (H) 1.47 (H) 1.69 (H) · IMPRESSION:  
· nela - likely due to ATN , low bp, SEPSIS , ATN due to vanco.  
· MSSA sepsis · hypertension · Removal of pace maker · S/p LEFT BKA · ckd  3 LIKELY due to DM nephropathy and HTN nephrosclerosis · Hyponatremia due to fluid overload · hypertension · Diabetic foot · Proteinuria likely due to DM nephropathy and HTN nephroscl- urine pr/cr 1.5 g/g · Metabolic acidosis · anemia iron defi - iron sats 10% · Vit d defi PLAN:  
· Start hydralazine · Restart bumex 1 mg daily · Avoid metolazone · Okay to d/c renal stand point. Follow up in 1 month · D/w patient ROS:- No c/o sob,  No c/o chest pain, No c/o nausea or vomiting, No c/o  fever. Physical exam:  
GEN:  NAD NECK:  Supple, no thyromegaly RESP: CTA  b/l, no  wheezing, CVS: RRR,S1,S2 ABDO:  soft , non tender, No mass NEURO: non focal, normal speech EXT: Edema +nt    
left BKA stump + Objective:  
Visit Vitals /72 Pulse 79 Temp 98.2 °F (36.8 °C) Resp 16 Ht 5' 10\" (1.778 m) Wt 110.2 kg (243 lb) SpO2 98% Breastfeeding No  
BMI 34.87 kg/m² Last 3 Recorded Weights in this Encounter 05/04/20 0400 05/05/20 0518 05/06/20 4224 Weight: 109.7 kg (241 lb 12.8 oz) 108.4 kg (238 lb 15.7 oz) 110.2 kg (243 lb) 05/04 1901 - 05/06 0700 In: 961.7 [P.O.:240; I.V.:721.7] Out: 1850 [[id number]] Intake/Output Summary (Last 24 hours) at 5/6/2020 1119 Last data filed at 5/6/2020 2621 Gross per 24 hour Intake 449.17 ml Output 650 ml Net -200.83 ml Chart reviewed. Pertinent Notes reviewed. Data Review : 
Recent Labs 05/06/20 
7682 05/05/20 
4739 05/04/20 
0321 05/03/20 
1719  139 136 137  
K 4.3 4.3 4.1 4.2 * 107 104 103 CO2 28 25 26 27 BUN 20 21* 24* 26* CREA 1.62* 1.51* 1.90* 1.91* GLU 79 89 93 103* CA 8.7 9.1 8.4* 8.8 MG  --   --   --  2.6* Recent Labs 05/06/20 
8247 05/04/20 
0321 05/03/20 
1719 WBC 6.8 8.9 8.2 HGB 8.3* 8.0* 7.0*  
HCT 27.5* 26.4* 23.2*  
* 427* 427* No results for input(s): FE, TIBC, PSAT, FERR in the last 72 hours. Recent Labs 05/04/20 
0321 CPK 80 Results for PERLA, Steven Vargas (MRN [id number]) as of 5/1/2020 16:17 Ref. Range 11/1/2013 15:35 8/11/2019 12:49 1/16/2020 18:40 1/17/2020 13:01 1/20/2020 08:30 3/10/2020 10:10 3/12/2020 13:12 4/21/2020 22:38 4/25/2020 00:53 4/26/2020 13:16 4/29/2020 16:10 4/30/2020 20:32 Vancomycin,trough Latest Ref Range: 5.0 - 10.0 ug/mL 25.8 (HH) 19.4 (H) 20.7 (HH)  24.9 (HH) 19.6 (H) 25.0 (HH) 20.6 (HH) 20.9 (HH) 20.8 (HH) 25.9 (HH) Vancomycin, random Latest Units: UG/ML    23.8        23.7 Lab Results Component Value Date/Time Color YELLOW/STRAW 03/08/2020 05:00 PM  
 Appearance CLOUDY (A) 03/08/2020 05:00 PM  
 Specific gravity 1.019 03/08/2020 05:00 PM  
 pH (UA) 5.0 03/08/2020 05:00 PM  
 Protein 100 (A) 03/08/2020 05:00 PM  
 Glucose 100 (A) 03/08/2020 05:00 PM  
 Ketone 15 (A) 03/08/2020 05:00 PM  
 Bilirubin NEGATIVE  03/08/2020 05:00 PM  
 Urobilinogen 0.2 03/08/2020 05:00 PM  
 Nitrites NEGATIVE  03/08/2020 05:00 PM  
 Leukocyte Esterase SMALL (A) 03/08/2020 05:00 PM  
 Epithelial cells FEW 03/08/2020 05:00 PM  
 Bacteria NEGATIVE  03/08/2020 05:00 PM  
 WBC 0-4 03/08/2020 05:00 PM  
 RBC 0-5 03/08/2020 05:00 PM  
 
Lab Results Component Value Date/Time Culture result: NO GROWTH 6 DAYS 04/21/2020 08:50 AM  
 Culture result: NO ANAEROBES ISOLATED 04/21/2020 07:20 AM  
 Culture result: LIGHT DIPHTHEROIDS (A) 04/21/2020 07:20 AM  
 
Lab Results Component Value Date/Time Specimen Description: RENEE 04/09/2014 12:27 PM  
 Specimen Description: RENEE 10/28/2013 05:00 PM  
 Specimen Description: [facility] 10/28/2013 03:09 PM  
 
Lab Results Component Value Date/Time Sodium,urine random 19 03/20/2020 01:20 PM  
 Creatinine, urine 125.00 03/20/2020 01:20 PM  
 
Results from [facility] Encounter encounter on 04/19/20 XR CHEST PORT Narrative Portable chest single view dated 4/20/2020 Comparison chest dated 4/19/2020 History is status post pacemaker change. A single portable AP upright view of the chest was obtained. The patient is 
slightly rotated towards the right. It is difficult to accurately assess the 
size of the cardiac silhouette. There has been interval removal of the cardiac 
pacer device and leads which were present on the left. A metallic wire projects 
over the upper portion of the right hemithorax. There is a suggestion of some 
increased density in the medial aspect of the upper portion of the right 
hemithorax. This may be associated with the brachiocephalic vasculature. A 
follow-up, true frontal examination of the chest may prove useful. Surgical 
clips project over the right lung base. The costophrenic angles are sharp in 
appearance. There is evidence of postsurgical change involving the cervical 
spine. Impression IMPRESSION: 
1. Interval removal of the cardiac pacer device and leads as described above. 2. Presence of a radiopaque wire which projects over the upper portion of the 
right hemithorax. Medication list  reviewed Current Facility-Administered Medications Medication  0.9% sodium chloride infusion  fentaNYL citrate (PF) injection 100 mcg  midazolam (VERSED) injection 5 mg  
 0.9% sodium chloride infusion 250 mL  WARFARIN INFORMATION NOTE (COUMADIN)  traMADoL (ULTRAM) tablet 50 mg  
 gabapentin (NEURONTIN) capsule 100 mg  polyethylene glycol (MIRALAX) packet 17 g  
 docusate sodium (COLACE) capsule 100 mg  
 alcohol 62% (NOZIN) nasal  1 Ampule  epoetin viet-epbx (RETACRIT) injection 20,000 Units  DAPTOmycin (CUBICIN) 900 mg in 0.9% sodium chloride 50 mL IVPB  oxyCODONE-acetaminophen (PERCOCET) 5-325 mg per tablet 1 Tab  hydrALAZINE (APRESOLINE) 20 mg/mL injection 10 mg  
 insulin glargine (LANTUS) injection 10 Units  [Held by provider] bumetanide (BUMEX) tablet 2 mg  busPIRone (BUSPAR) tablet 10 mg  
 sertraline (ZOLOFT) tablet 50 mg  
 glucose chewable tablet 16 g  
 dextrose (D50W) injection syrg 12.5-25 g  
 glucagon (GLUCAGEN) injection 1 mg  
 insulin lispro (HUMALOG) injection  sodium chloride (NS) flush 5-10 mL Mauricio Nguyen MD 
[facility] 
 www. [facility] Jennifer / [employer] [address] [place], [address] Phone - [phone] Fax - [phone]

## 2020-05-06 NOTE — PROGRESS NOTES
Problem: Falls - Risk of 
Goal: *Absence of Falls Description: Document Nyla Marie Fall Risk and appropriate interventions in the flowsheet. Outcome: Progressing Towards Goal 
Note: Fall Risk Interventions: 
Mobility Interventions: Assess mobility with egress test, Bed/chair exit alarm, OT consult for ADLs, Patient to call before getting OOB, Utilize walker, cane, or other assistive device, Strengthening exercises (ROM-active/passive) Medication Interventions: Assess postural VS orthostatic hypotension, Bed/chair exit alarm, Evaluate medications/consider consulting pharmacy, Patient to call before getting OOB, Teach patient to arise slowly Elimination Interventions: Bed/chair exit alarm, Call light in reach, Stay With Me (per policy), Patient to call for help with toileting needs, Toileting schedule/hourly rounds Problem: Pressure Injury - Risk of 
Goal: *Prevention of pressure injury Description: Document Valdemar Scale and appropriate interventions in the flowsheet. Outcome: Progressing Towards Goal 
Note: Pressure Injury Interventions: 
Sensory Interventions: Assess changes in LOC, Assess need for specialty bed, Check visual cues for pain, Float heels, Monitor skin under medical devices, Turn and reposition approx. every two hours (pillows and wedges if needed), Sit a 90-degree angle/use footstool if needed Moisture Interventions: Absorbent underpads, Apply protective barrier, creams and emollients, Check for incontinence Q2 hours and as needed, Contain wound drainage, Internal/External urinary devices, Minimize layers, Moisture barrier Activity Interventions: Assess need for specialty bed, Chair cushion, PT/OT evaluation, Pressure redistribution bed/mattress(bed type) Mobility Interventions: Assess need for specialty bed, Chair cushion, Float heels, Trapeze to reposition, PT/OT evaluation Nutrition Interventions: Document food/fluid/supplement intake Friction and Shear Interventions: Apply protective barrier, creams and emollients, Feet elevated on foot rest, HOB 30 degrees or less, Lift team/patient mobility team, Transfer aides:transfer board/Estefania lift/ceiling lift, Transferring/repositioning devices

## 2020-05-06 NOTE — PROGRESS NOTES
0700: Bedside shift change report given to Rukhsana Sharif RN and Noreen Spurling, RN (oncoming nurse) by GURINDER Dior (offgoing nurse). Report included the following information SBAR and Kardex.

## 2020-05-06 NOTE — PROGRESS NOTES
0700: Bedside shift change report given to Angy Nelson RN (oncoming nurse) by Dmitry Franklin RN (offgoing nurse). Report included the following information SBAR.

## 2020-05-06 NOTE — PROGRESS NOTES
TEE: [place] 48  
 
3:00pm-Ethan Berger accepted referral for [facility] Lindsay. 10:00am- Jose Hence with [facility] called CM via phone. Jose Hence stated that they have started pre certification for pt through her insurance. Jose Hence stated that she would let CM know when insurance is approved. 8:45am- CM called Jose Douglass with [facility] via phone. No answer. 8:30am- CM sent referral to [facility] via [id number] [place] [address] for IV Infusion via [employer]. CM will continue to follow patient for discharge planning needs and arrange for services as deemed necessary. Mikael Watts, [facility], [facility] 
[phone]

## 2020-05-06 NOTE — PROGRESS NOTES
Spiritual Care Assessment/Progress Note [address] 
 
 
NAME: Josie Grissom      MRN: [id number] AGE: 60 y.o. SEX: female Quaker Affiliation: [facility]  
Language: [place] 5/6/2020     Total Time (in minutes): 10 Spiritual Assessment begun in [facility] CARDIOPULMONARY CARE through conversation with: 
  
    [x]Patient        [] Family    [] Friend(s) Reason for Consult: Initial/Spiritual assessment, patient floor Spiritual beliefs: (Please include comment if needed) [x] Identifies with a adriane tradition:     
   [] Supported by a adriane community:        
   [] Claims no spiritual orientation:       
   [] Seeking spiritual identity:            
   [] Adheres to an individual form of spirituality:       
   [] Not able to assess:                   
 
    
Identified resources for coping:  
   [x] Prayer                           
   [] Music                  [] Guided Imagery [x] Family/friends                 [] Pet visits [] Devotional reading                         [] Unknown 
   [] Other:                                         
 
 
Interventions offered during this visit: (See comments for more details) Patient Interventions: Affirmation of adriane, Affirmation of emotions/emotional suffering, Prayer (assurance of), Initial/Spiritual assessment, patient floor, Iconic (affirming the presence of God/Higher Power) Plan of Care: 
 
 [] Support spiritual and/or cultural needs  
 [] Support AMD and/or advance care planning process    
 [] Support grieving process 
 [] Coordinate Rites and/or Rituals  
 [] Coordination with community clergy 
 [x] No spiritual needs identified at this time 
 [] Detailed Plan of Care below (See Comments)  [] Make referral to Music Therapy 
[] Make referral to Pet Therapy    
[] Make referral to Addiction services 
[] Make referral to [facility] 
[] Make referral to Spiritual Care Partner 
[] No future visits requested [x] Follow up visits as needed Comments:   Initial visit on [facility] for spiritual assessment. No family/friends present. Patient shared that she is doing okay today, but is ready for a nap. She recalled  visits from previous admissions and expressed appreciation for them. Reminded her of ongoing availability of pastoral support as needed. BURAK Ramirez, [facility], Staff  [facility]  Paging Service  [phone])

## 2020-05-06 NOTE — PROGRESS NOTES
Problem: Mobility Impaired (Adult and Pediatric) Goal: *Acute Goals and Plan of Care (Insert Text) Description: FUNCTIONAL STATUS PRIOR TO ADMISSION: Patient was able to ambulate indep for short household distances with RW, limited by wound on left foot. HOME SUPPORT PRIOR TO ADMISSION: Patient lives with  in a single story home, was able to sponge bathe and dress without assist.   shops for groceries, patient able to do th cooking. Physical Therapy Goals Initiated 5/2/2020 1. Patient will move from supine to sit and sit to supine  in bed with independence within 7 day(s). 2.  Patient will transfer from bed to chair and chair to bed with minimal assistance/contact guard assist using the least restrictive device within 7 day(s). 3.  Patient will perform sit to stand with minimal assistance/contact guard assist within 7 day(s). 4.  Patient will ambulate with minimal assistance/contact guard assist for 10 feet with the least restrictive device within 7 day(s). Outcome: Progressing Towards Goal 
 PHYSICAL THERAPY TREATMENT Patient: Layton Hale (75 y.o. female) Date: 5/6/2020 Diagnosis: Sepsis ([address] [A41.9] <principal problem not specified> Procedure(s) (LRB): LEFT BELOW KNEE AMPUTATION (Left) 5 Days Post-Op Precautions:  falls Chart, physical therapy assessment, plan of care and goals were reviewed. ASSESSMENT Patient continues with skilled PT services and is slowly progressing towards goals. Patient is very motivated to progress in therapy and improve overall strength and mobility. She requires CGA to min A for bed mobility. Patient is unable to clear hips from bed when attempting sit<>stand transfers requiring max A of 2 for multiple attempts. Patient instructed in and performing LE exercises including quad sets, hip abduction and hip flexion with knee flexion. Patient also instructed in proper donning and doffing of limb guard.  
Continue to recommend inpatient rehab following discharge. Current Level of Function Impacting Discharge (mobility/balance): cga to min A for bed mobility and total A for sit<>stand transfers PLAN : 
Patient continues to benefit from skilled intervention to address the above impairments. Continue treatment per established plan of care. to address goals. Recommendation for discharge: (in order for the patient to meet his/her long term goals) Therapy 3 hours per day 5-7 days per week This discharge recommendation: 
Has not yet been discussed the attending provider and/or case management IF patient discharges home will need the following DME: to be determined (TBD) SUBJECTIVE:  
Patient stated I so badly want to walk again. I am working hard on those are exercises.  OBJECTIVE DATA SUMMARY:  
Critical Behavior: 
Neurologic State: Alert Orientation Level: Oriented X4 Cognition: Follows commands, Appropriate decision making Safety/Judgement: Awareness of environment, Fall prevention Functional Mobility Training: 
Bed Mobility: 
Rolling: Minimum assistance Supine to Sit: Stand-by assistance Sit to Supine: Minimum assistance Scooting: Minimum assistance(managing LLE scooting up HOB) Transfers: 
Sit to Stand: (barely able to clear buttocks, unable to come to standing) Balance: 
Sitting: Intact Therapeutic Exercises:  
Patient performing 10 reps of quad sets, hip flexion with knee flexion and hip abduction x 10 reps each Activity Tolerance:  
Fair and requires rest breaks Please refer to the flowsheet for vital signs taken during this treatment. After treatment patient left in no apparent distress:  
Supine in bed and Call bell within reach COMMUNICATION/COLLABORATION:  
The patients plan of care was discussed with: Occupational therapist and Registered nurse. Cici Hodge, PT Time Calculation: 40 mins

## 2020-05-06 NOTE — PROGRESS NOTES
0505: BG with  AM lab work was 79. Provided pt. With cup of juice. 0700:  
Bedside shift change report GIVEN TO Tamera/GURINDER Burns. Report included the following information SBAR, Kardex, Procedure Summary, Intake/Output, MAR, Recent Results and Cardiac Rhythm NSR.  
 
 
 
SIGNIFICANT CHANGES DURING SHIFT:   
 
 
CONCERNS TO ADDRESS WITH MD:  Percocet and tramadol given x1 for pain that pt. States is a \"burning\" sensation [address] NURSING NOTE Admission Date 4/19/2020 Admission Diagnosis Sepsis ([address] [A41.9] Consults IP CONSULT TO HOSPITALIST 
IP CONSULT TO INFECTIOUS DISEASES 
IP CONSULT TO NEPHROLOGY 
IP CONSULT TO NEPHROLOGY 
IP CONSULT TO PHYSIATRIST(REHAB MEDICINE) IP CONSULT TO INTERVENTIONAL RADIOLOGY 
IP CONSULT TO CARDIOLOGY 
IP CONSULT TO VASCULAR SURGERY Cardiac Monitoring [x] Yes [] No  
  
Purposeful Hourly Rounding [x] Yes   
Rayne Score Total Score: 3 Rayne score 3 or > [] Bed Alarm [] Avasys [] 1:1 sitter [] Patient refused (Signed refusal form in chart) Valdemar Score Valdemar Score: 16 Valdemar score 14 or < [] PMT consult [] Wound Care consult  
 []  Specialty bed  [] Nutrition consult Influenza Vaccine Received Flu Vaccine for Current Season (usually Sept-March): Yes Oxygen needs? [x] Room air Oxygen @  []1L    []2L    []3L   []4L    []5L   []6L via NC Chronic home O2 use? [] Yes [x] No 
Perform O2 challenge test and document in progress note using smartphrase (.Homeoxygen) Last bowel movement Last Bowel Movement Date: 05/04/20 Urinary Catheter External Female Catheter 04/20/20-Urine Output (mL): 650 ml LDAs Venous Access Device [facility] [place] double lumen 05/04/20 (Active) Central Line Being Utilized Yes 5/6/2020  4:14 AM  
Criteria for Appropriate Use Long term IV/antibiotic administration 5/6/2020  4:14 AM  
Site Assessment Clean, dry, & intact 5/6/2020  4:14 AM  
Date of Last Dressing Change 05/04/20 5/6/2020  4:14 AM  
Dressing Status Clean, dry, & intact 5/6/2020  4:14 AM  
Dressing Type Transparent 5/6/2020  4:14 AM  
Positive Blood Return (Medial Site) Yes 5/6/2020  4:14 AM  
Action Taken (Medial Site) Infusing;Flushed;Blood drawn 5/6/2020  4:14 AM  
Date Accessed (Lateral Site) 05/04/20 5/5/2020  4:30 PM  
Positive Blood Return (Lateral Site) Yes 5/6/2020  4:14 AM  
Action Taken (Lateral Site) Flushed 5/6/2020  4:14 AM  
Alcohol Cap Used Yes 5/5/2020  4:30 PM  
  
Peripheral IV 04/30/20 Left Hand (Active) Site Assessment Clean, dry, & intact 5/6/2020  4:14 AM  
Phlebitis Assessment 0 5/6/2020  4:14 AM  
Infiltration Assessment 0 5/6/2020  4:14 AM  
Dressing Status Clean, dry, & intact 5/6/2020  4:14 AM  
Dressing Type Transparent;Tape 5/6/2020  4:14 AM  
Hub Color/Line Status Blue;Flushed 5/6/2020  4:14 AM  
Alcohol Cap Used Yes 5/5/2020  4:15 AM  
      
External Female Catheter 04/20/20 (Active) Site Assessment Clean, dry, & intact 5/6/2020  4:14 AM  
Repositioned Yes 5/6/2020  4:14 AM  
Perineal Care Yes 5/6/2020  4:14 AM  
Wick Changed Yes 5/6/2020  4:14 AM  
Suction Canister/Tubing Changed Yes 5/6/2020  4:14 AM  
Urine Output (mL) 650 ml 5/6/2020  4:14 AM  
            
  
Readmission Risk Assessment Tool Score High Risk [address] Total Score 3 Has Seen PCP in Last 6 Months (Yes=3, No=0) 2 . Living with Significant Other. Assisted Living. LTAC. SNF. or  
Rehab  
 3 Patient Length of Stay (>5 days = 3) 9 IP Visits Last 12 Months (1-3=4, 4=9, >4=11) 23 Charlson Comorbidity Score (Age + Comorbid Conditions) Criteria that do not apply:  
 Pt. Coverage (Medicare=5 , Medicaid, or Self-Pay=4) Expected Length of Stay 5d 16h Actual Length of Stay 16

## 2020-05-07 NOTE — PROGRESS NOTES
Infectious Disease Progress IMPRESSION:  
 
 
- MSSA Bacteremia & R/sided endocarditis 
  with pacemaker lead infection BC - 4/19-  MSSA - 1/1 - LAC BC-  4/19-  MSSA - 1/1 - RAC BC-  4/21-  NG 
  -S/p removal of dual lead Pacemaker on 4/27 MIKE - mobile vegetation on pacer lead Initial lead extraction attempted unsuccessful on 4/24 
   - Cellulitis of LLE , recurrent, s/p BKA ( 5/1) Previous admissions for same - 3/8-3/22, 1/14-1/20 RLE cellulitis - 12/6-12/9/19 
 - Diabetic foot ulcer , OM of foot plan for BKA in am 
  CT L/foot - no definite evidence of OM, D/w Dr Yesica Thakkar, gross pus ++ in wound WC - 4/20- Light  MSSA,few Strep Gp G beta hemolytic, Anaerobic -4/21-Light Diphtheroids Previous WC - 3/10- MSSA , Proteus mirabilis Xray - 
: Soft tissue wound at the level of the third through fifth proximal 
metatarsals. Underlying cortical irregularity and lucency within the proximal 
third through fifth metatarsals may represent osteomyelitis. - PARAG on CKD 4 Cr improved 1.59 
-Atilfirillation - ESR / CRP 85/14.4  
- Poor dentition 
- Diabetes mellitus with neuropathy A1c 7.5 
- Penicillin allergy - hives PLAN:  
  
 -  Daptomycin 900 mg IV daily end date 6/11, remove line at end of therapy 
  -- Weekly CBC, CMP, CK  & ESR/ CRP every other week  Fax reports to [phone], call with abnormal results at [phone]. Call with antibiotic related adverse events. - Encourage probiotic, yogurt 
   -Out pt ID follow up [facility] on 5/19 at 3 pm 
  - Blood sugar control - Plan of care D/w pt again Subjective:  
 
Pt seen . Denies complaints D/w RN . Pt for transfer to [address] [place] [address] Daniella Patient Active Problem List  
Diagnosis Code  History of breast cancer Z85.3  Asthma J45.909  Fe deficiency anemia D50.9  Status post ablation of atrial fibrillation Z98.890, Z86.79  
 Sick sinus syndrome (HCC) I49.5  S/P cardiac pacemaker procedure Z95.0  Long term (current) use of anticoagulants Z79.01  
 Mixed hyperlipidemia E78.2  CKD (chronic kidney disease), stage IV ([facility]) N18.4  Systolic murmur Z83.0  Essential hypertension I10  
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  
 Diabetic ulcer of right midfoot associated with type 2 diabetes mellitus, with fat layer exposed ([address] E11.621, C61.796  Foot deformity, acquired, left M21.962  Foot deformity, right M21.961  Pes cavus Q66.70  Type 2 diabetes mellitus with left diabetic foot infection ([facility]) E11.628, L08.9  Traumatic hematoma of foot, left, initial encounter X00.26FN  Diabetic ulcer of left midfoot with necrosis of muscle ([address] E11.621, W50.903  Left leg cellulitis [address]  Atrial fibrillation with RVR ([facility]) I48.91  
 H/O bilateral mastectomy Z90.13  Sepsis ([address] A41.9 Past Medical History:  
Diagnosis Date  Acquired lymphedema Right arm  Arrhythmia  Asthma  Atrial fibrillation ([address] Dr. [facility]ette Baldwin [facility]  Cancer ([address] bilateral breast  
 Chronic kidney disease  Diabetes mellitus type II   
 Dizziness  Essential hypertension  Hyperlipidemia  Hypertension  Long term (current) use of anticoagulants  Morbid obesity ([facility] 3/14/2012  Nausea & vomiting  Neuropathy  Osteoarthritis  Pacemaker  Sick sinus syndrome ([facility] Family History Problem Relation Age of Onset  Cancer Mother  Heart Disease Mother  Alcohol abuse Father  Diabetes Brother  Hypertension Brother Social History Tobacco Use  Smoking status: Never Smoker  Smokeless tobacco: Never Used Substance Use Topics  Alcohol use: Yes Comment: rare Past Surgical History:  
Procedure Laterality Date  ABDOMEN SURGERY PROC UNLISTED    
 gastric bypass  GASTRIC BYPASS,OBESE<150CM SAW-EN-Y  7/08  
 [place]  HX AFIB ABLATION    
 HX BREAST RECONSTRUCTION Bilateral   
 HX CARPAL TUNNEL RELEASE [address] [facility] [address] [facility] RIGHT MODIFIED RADICAL   
 HX MASTECTOMY Left   
 no lymphnode removal  
 HX MOHS PROCEDURES  1995  
 right  HX ORTHOPAEDIC Right   
 knee surgery  HX PACEMAKER    
 HX PACEMAKER    
 IR INSERT TUNL CVC W PORT OVER 5 YEARS    
 IR INSERT TUNL CVC W/O PORT OVER 5 YR  5/4/2020  [address]  8.21.07  
 UT [address] AND 1994  UT RELOCATION OF SKIN POCKET FOR PACEMAKER N/A 4/24/2020 RELOCATE 2 [facility] performed by Ava Aldana MD at [address] CATH LAB  UT RMVL TRANSVNS PM ELTRD 1 LEAD SYS ATR/VENTR N/A 4/24/2020 Remove Pacemaker Dual Leads performed by Ava Aldana MD at [facility] CATH LAB  UT RMVL TRANSVNS PM ELTRD 1 LEAD SYS ATR/VENTR N/A 4/27/2020 REMOVE PACEMAKER DUAL LEADS performed by Ava Aldana MD at [address] CATH LAB  UT TRABECULOPLASTY BY LASER SURGERY    
 US GUIDE ASP OVARIAN CYST ABD APPROACH  8.21.07  
 RIGHT Prior to Admission medications Medication Sig Start Date End Date Taking? Authorizing Provider DAPTOmycin (CUBICIN) IVPB 900 mg by IntraVENous route every twenty-four (24) hours for 35 days. 5/7/20 6/11/20 Yes Ziggy Dowell MD  
metOLazone (ZAROXOLYN) 2.5 mg tablet Take 1 tablet po on Mon and Thurs for worsening leg swelling. 4/13/20  Yes Mihir Allison MD  
warfarin (COUMADIN) 4 mg tablet Take 1 tablet on Tues and Sat and a half tablet the other 5 days. 4/10/20  Yes Mihir Allison MD  
bumetanide (BUMEX) 1 mg tablet Take 2 Tabs by mouth daily. 3/26/20  Yes Mihir Allison MD  
insulin glargine (Lantus U-100 Insulin) 100 unit/mL injection Inject 14 units daily 3/26/20  Yes Mihir Allison MD  
metoprolol succinate (TOPROL-XL) 100 mg tablet Take 2 Tabs by mouth daily. 3/26/20  Yes Mihir Allison MD  
dilTIAZem CD (CARDIZEM CD) 180 mg ER capsule Take 1 Cap by mouth daily. 3/22/20  Yes Cristopher Garay MD  
hydrALAZINE (APRESOLINE) 50 mg tablet Take 1 Tab by mouth three (3) times daily. 3/22/20  Yes Cristopher Garay MD  
acetaminophen 500 mg tab 500 mg, diphenhydrAMINE 25 mg cap 25 mg Take 2 Doses by mouth nightly. Yes Provider, Historical  
sertraline (ZOLOFT) 50 mg tablet TAKE ONE AND ONE-HALF (1 & 1/2) TABLET BY MOUTH DAILY 8/22/19  Yes Mihir Allison MD  
doxazosin (CARDURA) 4 mg tablet TAKE ONE TABLET BY MOUTH ONCE NIGHTLY 6/14/19  Yes Mihir Allison MD  
busPIRone (BUSPAR) 10 mg tablet TAKE ONE TABLET BY MOUTH TWICE A DAY 5/24/19  Yes Mihir Allison MD  
cholecalciferol, VITAMIN D3, (VITAMIN D3) 5,000 unit tab tablet Take 5,000 Units by mouth daily. Yes Provider, Historical  
vitamin A 8,000 unit capsule Take 8,000 Units by mouth daily. Yes Provider, Historical  
insulin lispro (HUMALOG) 100 unit/mL kwikpen 2 units with dinner for sugars over 200 1/3/14  Yes Mihir Allison MD  
cyanocobalamin (VITAMIN B-12) 1,000 mcg sublingual tablet Take 1,000 mcg by mouth daily. Yes Provider, Historical  
 
Allergies Allergen Reactions  Ace Inhibitors Cough  Amlodipine Swelling  Avapro [Irbesartan] Unable to Obtain  Bactrim [Sulfamethoxazole-Trimethoprim] Other (comments) Sore mouth  Captopril Cough  Carvedilol Diarrhea  Contrast Agent [Iodine] Itching [address]  Morphine Nausea and Vomiting and Swelling  Penicillins Hives Did not tolerate cefepime 3/2020  Pravachol [Pravastatin] Nausea Only  Seafood [Shellfish Containing Products] Hives  Singulair [Montelukast] Other (comments)  
  palpitations Review of Systems:  A comprehensive review of systems was negative except for that written in the History of Present Illness. 10 point review of systems obtained . All other systems negative Objective:  
Blood pressure 143/63, pulse 87, temperature 97.8 °F (36.6 °C), resp. rate 16, height 5' 10\" (1.778 m), weight 243 lb (110.2 kg), SpO2 99 %, not currently breastfeeding. Temp (24hrs), Av °F (36.7 °C), Min:97.7 °F (36.5 °C), Max:98.7 °F (37.1 °C) Current Facility-Administered Medications Medication Dose Route Frequency  warfarin (COUMADIN) tablet 2 mg  2 mg Oral ONCE  hydrALAZINE (APRESOLINE) tablet 25 mg  25 mg Oral TID  bumetanide (BUMEX) tablet 1 mg  1 mg Oral DAILY  0.9% sodium chloride infusion  25 mL/hr IntraVENous CONTINUOUS  
 fentaNYL citrate (PF) injection 100 mcg  100 mcg IntraVENous Multiple  midazolam (VERSED) injection 5 mg  5 mg IntraVENous Multiple  0.9% sodium chloride infusion 250 mL  250 mL IntraVENous PRN  
 WARFARIN INFORMATION NOTE (COUMADIN)   Other QPM  
 traMADoL (ULTRAM) tablet 50 mg  50 mg Oral Q6H PRN  
 gabapentin (NEURONTIN) capsule 100 mg  100 mg Oral TID  polyethylene glycol (MIRALAX) packet 17 g  17 g Oral BID  docusate sodium (COLACE) capsule 100 mg  100 mg Oral BID  alcohol 62% (NOZIN) nasal  1 Ampule  1 Ampule Topical Q12H  
 epoetin viet-epbx (RETACRIT) injection 20,000 Units  20,000 Units SubCUTAneous Q MON, WED & FRI  DAPTOmycin (CUBICIN) 900 mg in 0.9% sodium chloride 50 mL IVPB  900 mg IntraVENous Q24H  
 oxyCODONE-acetaminophen (PERCOCET) 5-325 mg per tablet 1 Tab  1 Tab Oral Q4H PRN  
 hydrALAZINE (APRESOLINE) 20 mg/mL injection 10 mg  10 mg IntraVENous Q6H PRN  
 insulin glargine (LANTUS) injection 10 Units  10 Units SubCUTAneous DAILY  busPIRone (BUSPAR) tablet 10 mg  10 mg Oral BID  sertraline (ZOLOFT) tablet 50 mg  50 mg Oral DAILY  glucose chewable tablet 16 g  4 Tab Oral PRN  
 dextrose (D50W) injection syrg 12.5-25 g  25-50 mL IntraVENous PRN  
 glucagon (GLUCAGEN) injection 1 mg  1 mg IntraMUSCular PRN  
 insulin lispro (HUMALOG) injection   SubCUTAneous AC&HS  sodium chloride (NS) flush 5-10 mL  5-10 mL IntraVENous PRN Exam:   
General:  Awake cooperative, Eyes:  Sclera anicteric. Pupils equally round and reactive to light. Mouth/Throat: Mucous membranes normal, oral pharynx clear Neck: Supple Lungs:   Clear to auscultation CV:  Regular rate and rhythm,no murmur, click, rub or gallop Abdomen:   Soft, non-tender. bowel sounds normal. non-distended Extremities: No cyanosis or edema Skin: Skin color, texture, turgor normal. no acute rash or lesions Lymph nodes: Cervical and supraclavicular normal  
Musculoskeletal: LLE BKA Lines/Devices:  Intact, no erythema, drainage or tenderness Psych: AAOx 3 , normal mood, affect Data Review: CBC:  
Recent Labs 05/06/20 
0118 WBC 6.8  
RBC 2.88* HGB 8.3* HCT 27.5*  
* CMP:  
Recent Labs 05/07/20 
8507 05/06/20 
8453 05/05/20 
4482 GLU 68 79 89  142 139  
K 4.5 4.3 4.3 * 110* 107 CO2 26 28 25 BUN 16 20 21* CREA 1.59* 1.62* 1.51* CA 8.4* 8.7 9.1 AGAP 5 4* 7  
BUCR 10* 12 14 Lab Results Component Value Date/Time  Culture result: NO GROWTH 6 DAYS 04/21/2020 08:50 AM  
 Culture result: NO ANAEROBES ISOLATED 04/21/2020 07:20 AM Culture result: LIGHT DIPHTHEROIDS (A) 04/21/2020 07:20 AM  
 Culture result: LIGHT STAPHYLOCOCCUS AUREUS (A) 04/20/2020 05:48 PM  
 Culture result: (A) 04/20/2020 05:48 PM  
  FEW STREPTOCOCCI, BETA HEMOLYTIC GROUP G Penicillin and ampicillin are drugs of choice for treatment of beta-hemolytic streptococcal infections. Susceptibility testing of penicillins and beta-lactams approved by the FDA for treatment of beta-hemolytic streptococcal infections need not be performed routinely, because nonsusceptible isolates are extremely rare. CLSI 2012 XR Results (most recent): 
Results from Hospital Encounter encounter on 04/19/20 XR CHEST PORT Narrative Portable chest single view dated 4/20/2020 Comparison chest dated 4/19/2020 History is status post pacemaker change. A single portable AP upright view of the chest was obtained. The patient is 
slightly rotated towards the right. It is difficult to accurately assess the 
size of the cardiac silhouette. There has been interval removal of the cardiac 
pacer device and leads which were present on the left. A metallic wire projects 
over the upper portion of the right hemithorax. There is a suggestion of some 
increased density in the medial aspect of the upper portion of the right 
hemithorax. This may be associated with the brachiocephalic vasculature. A 
follow-up, true frontal examination of the chest may prove useful. Surgical 
clips project over the right lung base. The costophrenic angles are sharp in 
appearance. There is evidence of postsurgical change involving the cervical 
spine. Impression IMPRESSION: 
1. Interval removal of the cardiac pacer device and leads as described above. 2. Presence of a radiopaque wire which projects over the upper portion of the 
right hemithorax. ICD-10-CM ICD-9-CM 1. Ulcer of left foot, unspecified ulcer stage ([facility] L97.529 707.15   
2.  Osteomyelitis of left foot, unspecified type ([address] M86.9 730.27 3. Cellulitis of left lower extremity L03.116 682.6 4. Disorder of cardiac pacemaker system, subsequent encounter T82. 9XXD V58.89 ELECTROPHYSIOLOGY PROCEDURE  
  996.72 ELECTROPHYSIOLOGY PROCEDURE  
   ELECTROPHYSIOLOGY PROCEDURE  
   ELECTROPHYSIOLOGY PROCEDURE  
   CANCELED: INVASIVE VASCULAR PROCEDURE  
   CANCELED: INVASIVE VASCULAR PROCEDURE Antibotic History Daptomycin IV - 5/1 
 s/p Vancomycin IV I have discussed the diagnosis with the patient and the intended plan as seen in the above orders. I have discussed medication side effects and warnings with the patient as well. Reviewed test results at length with patient Signed By: Sergo Seo MD FACP

## 2020-05-07 NOTE — PROGRESS NOTES
0700: Bedside shift change report given to Kailey Herman RN (oncoming nurse) by Marlyn De La Paz (offgoing nurse). Report included the following information SBAR and Kardex.

## 2020-05-07 NOTE — PROGRESS NOTES
Nephrology Progress Note [facility] 
www. [facility].[employer]                  Phone - [phone] Patient: Chance Rabago Date- 5/7/2020 Admit Date: 4/19/2020 YOB: 1960 CC: Follow up for PARAG on ckd Subjective: Interval History: - 5/7 
BP HIGH 
CR STABLE 
 
5/6/2020 Cr. Stable No fever 
bp on high side 5/4/2020 CR stable at 1.9 
bp on lowside Bradycardia over the weekend 5/1/2020 Ms. Wharton has pmh of ckd, htn, dm. She is admitted with left foot cellulitis,. She is dx with MSSA sepsis. She has undergone LEFT BKA today She vegetation on pacemaker leads- pace maker removed on 4- She has developed parag with cr. 1.7 today She is on vanco. Her vanco level was 25.9 on 4- Her bp has been on low side. Her cr. Level has been higher in past 
 
Date. ..------. ....      . cr 
5-1-2020----------1.69 
4--------1.47 
4.22.2020---    ---1.52 
4---    --1.95 
Cr. Trends Ref. Range 1/20/2020 04:07 3/8/2020 11:25 3/9/2020 04:30 3/10/2020 01:58 3/11/2020 03:58 3/12/2020 03:54 3/13/2020 04:10 3/14/2020 02:22 3/15/2020 04:12 3/16/2020 00:53 3/17/2020 00:55 3/18/2020 04:01 3/19/2020 01:35 3/20/2020 00:52 3/21/2020 05:06 3/22/2020 03:27 4/19/2020 21:53 4/21/2020 05:33 4/22/2020 01:17 4/23/2020 02:21 4/24/2020 04:09 4/25/2020 00:53 4/26/2020 03:37 4/27/2020 03:04 4/28/2020 04:57 4/29/2020 04:48 4/30/2020 02:07 5/1/2020 04:12 Creatinine Latest Ref Range: 0.55 - 1.02 MG/DL 2.12 (H) 2.17 (H) 1.92 (H) 1.88 (H) 1.91 (H) 1.88 (H) 1.86 (H) 1.95 (H) 2.06 (H) 2.19 (H) 2.41 (H) 2.38 (H) 2.51 (H) 2.48 (H) 2.38 (H) 2.12 (H) 1.95 (H) 1.89 (H) 1.68 (H) 1.54 (H) 1.52 (H) 1.38 (H) 1.46 (H) 1.34 (H) 1.35 (H) 1.45 (H) 1.47 (H) 1.69 (H) · IMPRESSION:  
· PARAG - likely due to ATN , low bp, SEPSIS , ATN due to vanco.  
· HYPERTENSION 
· MSSA sepsis · hypertension · Removal of pace maker · S/p LEFT BKA · ckd  3 LIKELY due to DM nephropathy and HTN nephrosclerosis · Hyponatremia due to fluid overload · Diabetic foot · Proteinuria likely due to DM nephropathy and HTN nephroscl- urine pr/cr 1.5 g/g · Metabolic acidosis · anemia iron defi - iron sats 10% · Vit d defi PLAN:  
· INCREASE hydralazine 50 mg tid · Continue bumex · Avoid metolazone · Okay to d/c renal stand point. Follow up in 1 month · D/w patient and nurse ROS:-No C/O of chest pain,SOB, vomiting, abdominal pain,fever,chills Physical exam:  
GEN:  NAD NECK:  Supple, no thyromegaly RESP: CTA  b/l, no  wheezing, CVS: RRR,S1,S2 ABDO:  soft , non tender, No mass NEURO: non focal, normal speech EXT: Edema +nt    
left BKA stump + Objective:  
Visit Vitals /68 Pulse 83 Temp 97.8 °F (36.6 °C) Resp 16 Ht 5' 10\" (1.778 m) Wt 110.2 kg (243 lb) SpO2 98% Breastfeeding No  
BMI 34.87 kg/m² Last 3 Recorded Weights in this Encounter 05/04/20 0400 05/05/20 0518 05/06/20 5313 Weight: 109.7 kg (241 lb 12.8 oz) 108.4 kg (238 lb 15.7 oz) 110.2 kg (243 lb) 05/05 1901 - 05/07 0700 In: 822.9 [P.O.:480; I.V.:342.9] Out: 1800 [Urine:1800] Intake/Output Summary (Last 24 hours) at 5/7/2020 0915 Last data filed at 5/6/2020 2335 Gross per 24 hour Intake 613.75 ml Output 1150 ml Net -536.25 ml Chart reviewed. Pertinent Notes reviewed. Data Review : 
Recent Labs 05/07/20 
9244 05/06/20 
6367 05/05/20 
0381  142 139  
K 4.5 4.3 4.3 * 110* 107 CO2 26 28 25 BUN 16 20 21* CREA 1.59* 1.62* 1.51* GLU 68 79 89  
CA 8.4* 8.7 9.1 Recent Labs 05/06/20 
3316 WBC 6.8 HGB 8.3* HCT 27.5*  
* No results for input(s): FE, TIBC, PSAT, FERR in the last 72 hours. No results for input(s): CPK, CKNDX, TROIQ in the last 72 hours. No lab exists for component: CPKMBResults for Scott Murphy (MRN [id number]) as of 5/1/2020 16:17 Ref.  Range 11/1/2013 15:35 8/11/2019 12:49 1/16/2020 18:40 1/17/2020 13:01 1/20/2020 08:30 3/10/2020 10:10 3/12/2020 13:12 4/21/2020 22:38 4/25/2020 00:53 4/26/2020 13:16 4/29/2020 16:10 4/30/2020 20:32 Vancomycin,trough Latest Ref Range: 5.0 - 10.0 ug/mL 25.8 (HH) 19.4 (H) 20.7 (HH)  24.9 (HH) 19.6 (H) 25.0 (HH) 20.6 (HH) 20.9 (HH) 20.8 (HH) 25.9 (HH) Vancomycin, random Latest Units: UG/ML    23.8        23.7 Lab Results Component Value Date/Time Color YELLOW/STRAW 03/08/2020 05:00 PM  
 Appearance CLOUDY (A) 03/08/2020 05:00 PM  
 Specific gravity 1.019 03/08/2020 05:00 PM  
 pH (UA) 5.0 03/08/2020 05:00 PM  
 Protein 100 (A) 03/08/2020 05:00 PM  
 Glucose 100 (A) 03/08/2020 05:00 PM  
 Ketone 15 (A) 03/08/2020 05:00 PM  
 Bilirubin NEGATIVE  03/08/2020 05:00 PM  
 Urobilinogen 0.2 03/08/2020 05:00 PM  
 Nitrites NEGATIVE  03/08/2020 05:00 PM  
 Leukocyte Esterase SMALL (A) 03/08/2020 05:00 PM  
 Epithelial cells FEW 03/08/2020 05:00 PM  
 Bacteria NEGATIVE  03/08/2020 05:00 PM  
 WBC 0-4 03/08/2020 05:00 PM  
 RBC 0-5 03/08/2020 05:00 PM  
 
Lab Results Component Value Date/Time Culture result: NO GROWTH 6 DAYS 04/21/2020 08:50 AM  
 Culture result: NO ANAEROBES ISOLATED 04/21/2020 07:20 AM  
 Culture result: LIGHT DIPHTHEROIDS (A) 04/21/2020 07:20 AM  
 
Lab Results Component Value Date/Time Specimen Description: RADHAMAREK 04/09/2014 12:27 PM  
 Specimen Description: RENEE 10/28/2013 05:00 PM  
 Specimen Description: [place] 10/28/2013 03:09 PM  
 
Lab Results Component Value Date/Time Sodium,urine random 19 03/20/2020 01:20 PM  
 Creatinine, urine 125.00 03/20/2020 01:20 PM  
 
Results from [facility]  [facility] Encounter encounter on 04/19/20 XR CHEST PORT Narrative Portable chest single view dated 4/20/2020 Comparison chest dated 4/19/2020 History is status post pacemaker change. A single portable AP upright view of the chest was obtained. The patient is 
slightly rotated towards the right.  It is difficult to accurately assess the 
size of the cardiac silhouette. There has been interval removal of the cardiac 
pacer device and leads which were present on the left. A metallic wire projects 
over the upper portion of the right hemithorax. There is a suggestion of some 
increased density in the medial aspect of the upper portion of the right 
hemithorax. This may be associated with the brachiocephalic vasculature. A 
follow-up, true frontal examination of the chest may prove useful. Surgical 
clips project over the right lung base. The costophrenic angles are sharp in 
appearance. There is evidence of postsurgical change involving the cervical 
spine. Impression IMPRESSION: 
1. Interval removal of the cardiac pacer device and leads as described above. 2. Presence of a radiopaque wire which projects over the upper portion of the 
right hemithorax. Medication list  reviewed Current Facility-Administered Medications Medication  hydrALAZINE (APRESOLINE) tablet 25 mg  
 bumetanide (BUMEX) tablet 1 mg  
 0.9% sodium chloride infusion  fentaNYL citrate (PF) injection 100 mcg  midazolam (VERSED) injection 5 mg  
 0.9% sodium chloride infusion 250 mL  WARFARIN INFORMATION NOTE (COUMADIN)  traMADoL (ULTRAM) tablet 50 mg  
 gabapentin (NEURONTIN) capsule 100 mg  polyethylene glycol (MIRALAX) packet 17 g  
 docusate sodium (COLACE) capsule 100 mg  
 alcohol 62% (NOZIN) nasal  1 Ampule  epoetin viet-epbx (RETACRIT) injection 20,000 Units  DAPTOmycin (CUBICIN) 900 mg in 0.9% sodium chloride 50 mL IVPB  oxyCODONE-acetaminophen (PERCOCET) 5-325 mg per tablet 1 Tab  hydrALAZINE (APRESOLINE) 20 mg/mL injection 10 mg  
 insulin glargine (LANTUS) injection 10 Units  busPIRone (BUSPAR) tablet 10 mg  
 sertraline (ZOLOFT) tablet 50 mg  
 glucose chewable tablet 16 g  
 dextrose (D50W) injection syrg 12.5-25 g  
 glucagon (GLUCAGEN) injection 1 mg  
 insulin lispro (HUMALOG) injection  sodium chloride (NS) flush 5-10 mL [place] MD Candido 
[facility] 
 [email or web address] Jennifer / [employer] [address] [place], [address] Phone - [phone] Fax - [phone]

## 2020-05-07 NOTE — PROGRESS NOTES
0445: BG with AM lab work 68. POC test BG 72. Provided pt. With ginger ale (pt. States she can't have juice due to a gastric bypass) and a raspberry icee. 0700:  
Bedside shift change report GIVEN TO Tamera/GURINDER Burns. Report included the following information SBAR, Kardex, Procedure Summary, Intake/Output, MAR, Recent Results and Cardiac Rhythm NSR.  
 
 
 
SIGNIFICANT CHANGES DURING SHIFT:  Pt. Needs nystatin cream for sacral/gluteal cleft CONCERNS TO ADDRESS WITH MD:   
 
 
 
 
[facility] NURSING NOTE Admission Date 4/19/2020 Admission Diagnosis Sepsis ([facility] [address] [A41.9] Consults IP CONSULT TO HOSPITALIST 
IP CONSULT TO INFECTIOUS DISEASES 
IP CONSULT TO NEPHROLOGY 
IP CONSULT TO NEPHROLOGY 
IP CONSULT TO PHYSIATRIST(REHAB MEDICINE) IP CONSULT TO INTERVENTIONAL RADIOLOGY 
IP CONSULT TO CARDIOLOGY 
IP CONSULT TO VASCULAR SURGERY Cardiac Monitoring [x] Yes [] No  
  
Purposeful Hourly Rounding [x] Yes   
Rayne Score Total Score: 3 Rayne score 3 or > [x] Bed Alarm [] Avasys [] 1:1 sitter [] Patient refused (Signed refusal form in chart) Valdemar Score Valdemar Score: 16 Valdemar score 14 or < [] PMT consult [] Wound Care consult  
 []  Specialty bed  [] Nutrition consult Influenza Vaccine Received Flu Vaccine for Current Season (usually Sept-March): Yes Oxygen needs? [x] Room air Oxygen @  []1L    []2L    []3L   []4L    []5L   []6L via NC Chronic home O2 use? [] Yes [x] No 
Perform O2 challenge test and document in progress note using smartphrase (.Homeoxygen) Last bowel movement Last Bowel Movement Date: 05/05/20 Urinary Catheter External Female Catheter 04/20/20-Urine Output (mL): 700 ml LDAs Venous Access Device [employer] [employer] double lumen 05/04/20 (Active) Central Line Being Utilized Yes 5/6/2020 11:29 PM  
Criteria for Appropriate Use Long term IV/antibiotic administration 5/6/2020 11:29 PM  
Site Assessment Clean, dry, & intact 5/6/2020 11:29 PM  
Date of Last Dressing Change 05/04/20 5/6/2020 11:29 PM  
Dressing Status Clean, dry, & intact 5/6/2020 11:29 PM  
Dressing Type Transparent;Tape 5/6/2020 11:29 PM  
Action Taken Open ports on tubing capped 5/6/2020  2:00 PM  
Positive Blood Return (Medial Site) Yes 5/6/2020 11:29 PM  
Action Taken (Medial Site) Infusing 5/6/2020 11:29 PM  
Date Accessed (Lateral Site) 05/04/20 5/6/2020 11:29 PM  
Positive Blood Return (Lateral Site) Yes 5/6/2020 11:29 PM  
Action Taken (Lateral Site) Flushed 5/6/2020 11:29 PM  
Alcohol Cap Used Yes 5/6/2020 11:29 PM  
  
Peripheral IV 04/30/20 Left Hand (Active) Site Assessment Clean, dry, & intact 5/6/2020  8:08 PM  
Phlebitis Assessment 0 5/6/2020  8:08 PM  
Infiltration Assessment 0 5/6/2020  8:08 PM  
Dressing Status Clean, dry, & intact 5/6/2020  8:08 PM  
Dressing Type Transparent;Tape 5/6/2020  8:08 PM  
Hub Color/Line Status Blue 5/6/2020  8:08 PM  
Alcohol Cap Used Yes 5/5/2020  4:15 AM  
      
External Female Catheter 04/20/20 (Active) Site Assessment Clean, dry, & intact 5/6/2020 11:29 PM  
Repositioned Yes 5/6/2020 11:29 PM  
Perineal Care Yes 5/6/2020 11:29 PM  
Wick Changed Yes 5/6/2020 11:29 PM  
Suction Canister/Tubing Changed Yes 5/6/2020 11:29 PM  
Urine Output (mL) 700 ml 5/6/2020 11:35 PM  
            
  
Readmission Risk Assessment Tool Score High Risk 36 Total Score 3 Has Seen PCP in Last 6 Months (Yes=3, No=0) 2 . Living with Significant Other. Assisted Living. LTAC. SNF. or  
Rehab  
 3 Patient Length of Stay (>5 days = 3) 9 IP Visits Last 12 Months (1-3=4, 4=9, >4=11) 23 Charlson Comorbidity Score (Age + Comorbid Conditions) Criteria that do not apply:  
 Pt. Coverage (Medicare=5 , Medicaid, or Self-Pay=4) Expected Length of Stay 5d 16h Actual Length of Stay 17

## 2020-05-07 NOTE — PROGRESS NOTES
[facility] to Sabi 60 y.o.   female [address] [facility]   Room: 2284/01    MRM [address]  Unit Phone# :  [phone] [facility] 
MRM 2 CARDIOPULMONARY CARE 
[address] [place] [address] Dept: [phone] Loc: [id number] SITUATION Admitted:  4/19/2020         Attending Provider:  No att. providers found Consultations:  IP CONSULT TO HOSPITALIST 
IP CONSULT TO INFECTIOUS DISEASES 
IP CONSULT TO NEPHROLOGY 
IP CONSULT TO NEPHROLOGY 
IP CONSULT TO PHYSIATRIST(REHAB MEDICINE) IP CONSULT TO INTERVENTIONAL RADIOLOGY 
IP CONSULT TO CARDIOLOGY 
IP CONSULT TO VASCULAR SURGERY 
 
PCP:  Geovanna Mendez MD   [phone] Treatment Team: Consulting Provider: Gildardo Thorpe MD; Consulting Provider: Sherwin Pacheco DPM; Hospitalist: Chandler Marcos NP; Consulting Provider: Yuliet Campbell MD; Care Manager: Jayesh Guaman; Hospitalist: Crissy Estrada NP; Consulting Provider: Paige Hoyt MD; Consulting Provider: Genia Leslie MD; Care Manager: Gini Huerta; Consulting Provider: Daniel Wyatt MD; Staff Nurse: Ashley Navas Admitting Dx:  Sepsis ([facility] [address] [A41.9] Principal Problem: <principal problem not specified> 
 
6 Days Post-Op of  
Procedure(s): LEFT BELOW KNEE AMPUTATION  
BY: Genia Leslie MD             ON: 5/1/2020 Code Status: Prior Advance Directives:  
Advance Care Planning 4/20/2020 Confirm Advance Directive Yes, on file Patient Would Like to Complete Advance Directive - (Send w/patient) Not Received Isolation:  There are currently no Active Isolations       MDRO: No current active infections Pain Medications given:  1407    Last dose: 5/7/2020 at  1407 Special Equipment needed: no  Type of equipment: 
 
 
(Not currently on dialysis) BACKGROUND Allergies: Allergies Allergen Reactions  Ace Inhibitors Cough  Amlodipine Swelling  Avapro [Irbesartan] Unable to Obtain  Bactrim [Sulfamethoxazole-Trimethoprim] Other (comments) Sore mouth  Captopril Cough  Carvedilol Diarrhea  Contrast Agent [Iodine] Itching [address]  Morphine Nausea and Vomiting and Swelling  Penicillins Hives Did not tolerate cefepime 3/2020  Pravachol [Pravastatin] Nausea Only  Seafood [Shellfish Containing Products] Hives  Singulair [Montelukast] Other (comments)  
  palpitations Past Medical History:  
Diagnosis Date  Acquired lymphedema Right arm  Arrhythmia  Asthma  Atrial fibrillation ([facility] [address] Dr. Tanna Hercules and Dr. Piol Gard[facility])  Cancer ([facility] [address] bilateral breast  
 Chronic kidney disease  Diabetes mellitus type II   
 Dizziness  Essential hypertension  Hyperlipidemia  Hypertension  Long term (current) use of anticoagulants  Morbid obesity ([facility] [address] 3/14/2012  Nausea & vomiting  Neuropathy  Osteoarthritis  Pacemaker  Sick sinus syndrome ([facility] [address] Past Surgical History:  
Procedure Laterality Date  ABDOMEN SURGERY PROC UNLISTED    
 gastric bypass  GASTRIC BYPASS,OBESE<150CM SAW-EN-Y  7/08  
 [facility]  HX AFIB ABLATION    
 HX BREAST RECONSTRUCTION Bilateral   
 HX CARPAL TUNNEL RELEASE [address] [id number] RIGHT MODIFIED RADICAL   
 HX MASTECTOMY Left   
 no lymphnode removal  
 HX MOHS PROCEDURES  1995  
 right  HX ORTHOPAEDIC Right   
 knee surgery  HX PACEMAKER    
 HX PACEMAKER    
 IR INSERT TUNL CVC W PORT OVER 5 YEARS    
 IR INSERT TUNL CVC W/O PORT OVER 5 YR  5/4/2020  [address]  8.21.07  
 ME Shan 9462 AND 1994  UT RELOCATION OF SKIN POCKET FOR PACEMAKER N/A 4/24/2020 RELOCATE [address] performed by Jodee Ly MD at [address] CATH LAB  UT RMVL TRANSVNS PM ELTRD 1 LEAD SYS ATR/VENTR N/A 4/24/2020 Remove Pacemaker Dual Leads performed by Jodee Ly MD at [address] CARDIAC CATH LAB  UT RMVL TRANSVNS PM ELTRD 1 LEAD SYS ATR/VENTR N/A 4/27/2020 REMOVE PACEMAKER DUAL LEADS performed by Jodee Ly MD at [address] CATH LAB  UT TRABECULOPLASTY BY LASER SURGERY    
 US GUIDE ASP OVARIAN CYST ABD APPROACH  8.21.07  
 RIGHT Medications Prior to Admission Medication Sig  DAPTOmycin (CUBICIN) IVPB 900 mg by IntraVENous route every twenty-four (24) hours for 35 days.  insulin glargine (Lantus U-100 Insulin) 100 unit/mL injection 10 Units by SubCUTAneous route nightly. Inject 14 units daily  warfarin (COUMADIN) 4 mg tablet Take 1 tablet on Tues and Sat and a half tablet the other 5 days.  bumetanide (BUMEX) 1 mg tablet Take 2 Tabs by mouth daily.  hydrALAZINE (APRESOLINE) 50 mg tablet Take 1 Tab by mouth three (3) times daily.  sertraline (ZOLOFT) 50 mg tablet TAKE ONE AND ONE-HALF (1 & 1/2) TABLET BY MOUTH DAILY  busPIRone (BUSPAR) 10 mg tablet TAKE ONE TABLET BY MOUTH TWICE A DAY  insulin lispro (HUMALOG) 100 unit/mL kwikpen 2 units with dinner for sugars over 200 Hard scripts included in transfer packet no Vaccinations:   
Immunization History Administered Date(s) Administered  (RETIRED) Pneumococcal Vaccine (Unspecified Type) 10/10/2009  Influenza Vaccine 10/05/2014, 10/01/2015, 10/01/2016, 10/06/2017, 10/11/2018  Influenza Vaccine (Quad) PF 09/27/2019  Influenza Vaccine PF 11/01/2013  Influenza Vaccine Split 11/30/2010, 11/07/2011  Pneumococcal Polysaccharide (PPSV-23) 10/10/2009  Tdap 06/13/2016 Readmission Risks:    Known Risks:   
 
  
 The Charlson CoMorbitiy Index tool is an evidenced based tool that has more automatic generated information. The tool looks at many different items such as the age of the patient, how many times they were admitted in the last calendar year, current length of stay in the hospital and their diagnosis. All of these items are pulled automatically from information documented in the chart from various places and will generate a score that predicts whether a patient is at low (less than 13), medium (13-20) or high (21 or greater) risk of being readmitted. ASSESSMENT Temp: 97.8 °F (36.6 °C) (05/07/20 1133) Pulse (Heart Rate): 87 (05/07/20 1133) Resp Rate: 16 (05/07/20 1133)           BP: 143/63 (05/07/20 1133) O2 Sat (%): 99 % (05/07/20 1133) Weight: 110.2 kg (243 lb)    Height: 5' 10\" (177.8 cm) (05/01/20 0636) If above not within 1 hour of discharge: 
 
BP:_____  P:____  R:____ T:_____ O2 Sat: ___%  O2: ______ Active Orders Diet DIET DIABETIC CONSISTENT CARB Regular Orientation: oriented to time, place, person and situation Active Behaviors: None Active Lines/Drains:  (Peg Tube / [place] / CL or S/L?): no 
 
Urinary Status: External catheter     Last BM: Last Bowel Movement Date: 05/05/20 Skin Integrity: Wound (add Wound LDA) Mobility: Slightly limited Weight Bearing Status: WBAT (Weight Bearing as Tolerated) Lab Results Component Value Date/Time Glucose 68 05/07/2020 03:34 AM  
 Hemoglobin A1c 7.5 (H) 03/08/2020 07:09 PM  
 INR 2.2 (H) 05/07/2020 03:34 AM  
 INR 1.8 (H) 05/06/2020 04:20 AM  
 HGB 8.3 (L) 05/06/2020 04:20 AM  
 HGB 8.0 (L) 05/04/2020 03:21 AM  
  
  RECOMMENDATION See After Visit Summary (AVS) for: · Discharge instructions · After [address] · Special equipment needed (entered pre-discharge by Care Management) · Medication Reconciliation · Follow up Appointment(s) Report given/sent by:  Blythe Runner Verbal report given to: Tigre  FAXED to:   
    
Estimated discharge time:  5/7/2020 at 1400

## 2020-05-07 NOTE — PROGRESS NOTES
0700: Bedside shift change report given to Diego Barrera RN (oncoming nurse) by Wally Mustafa RN (offgoing nurse). Report included the following information SBAR.

## 2020-05-07 NOTE — PROGRESS NOTES
TEE: Sav[place] 48 
 
11:50am-AVS updated. No further CM needs identified. CM notified pt's nurse of d/c. 
 
11:00am- CM informed pt's nurse of possible discharge. Call to report: [phone] or [phone]. Pt going to room 1111 
 
10:55am-CM called pt's room via phone due to COVID-19 restriction to discuss d/c plan. CM informed pt that [facility] receive insurance auth and they have a bed available. CM informed pt that she will be discharge today to [facility]. CM inquired with pt if she would like CM to call her family to inform them of d/c plan. Pt stated that she would inform family of d/c.  
 
10:50am- Brandon Rhodes with [facility] called CM via phone. Brandon Rhodes stated that they received insurance auth and that they have a bed available for pt. Care Management Interventions PCP Verified by CM: Yes 
Palliative Care Criteria Met (RRAT>21 & CHF Dx)?: No 
Mode of Transport at Discharge: Other (see comment) Transition of Care Consult (CM Consult): Other([facility]) Discharge Durable Medical Equipment: No 
Physical Therapy Consult: Yes Occupational Therapy Consult: Yes Speech Therapy Consult: No 
Current Support Network: Lives with Spouse, Own Home Confirm Follow Up Transport: Family [employer] Information Provided?: No 
Discharge Location Discharge Placement: Rehab hospital/unit acute([facility] [address]) Gene Miller [address] 
[phone]

## 2020-05-07 NOTE — PROGRESS NOTES
Infectious Disease Progress IMPRESSION:  
 
 
- MSSA Bacteremia & R/sided endocarditis 
  with pacemaker lead infection BC - 4/19-  MSSA - 1/1 - LAC BC-  4/19-  MSSA - 1/1 - RAC BC-  4/21-  NG 
  -S/p removal of dual lead Pacemaker on 4/27 MIKE - mobile vegetation on pacer lead Initial lead extraction attempted unsuccessful on 4/24 
   - Cellulitis of LLE , recurrent, s/p BKA ( 5/1) Previous admissions for same - 3/8-3/22, 1/14-1/20 RLE cellulitis - 12/6-12/9/19 
 - Diabetic foot ulcer , OM of foot plan for BKA in am 
  CT L/foot - no definite evidence of OM, D/w Dr Stas Pandey, gross pus ++ in wound WC - 4/20- Light  MSSA,few Strep Gp G beta hemolytic, Anaerobic -4/21-Light Diphtheroids Previous WC - 3/10- MSSA , Proteus mirabilis Xray - 
: Soft tissue wound at the level of the third through fifth proximal 
metatarsals. Underlying cortical irregularity and lucency within the proximal 
third through fifth metatarsals may represent osteomyelitis. - PARAG on CKD 4 Cr increase to 1.69 
-Atil firillation - ESR / CRP 85/14.4  
- Poor dentition 
- Diabetes mellitus with neuropathy A1c 7.5 
- Penicillin allergy - hives PLAN:  
  
 -  Daptomycin 900 mg IV daily end date 6/11, remove line at end of therapy 
  -- Weekly CBC, CMP, CK  & ESR/ CRP every other week  Fax reports to [phone], call with abnormal results at [phone]. Call with antibiotic related adverse events. - Encourage probiotic, yogurt 
   -Out pt ID follow up [facility] on 5/19 at 3 pm 
  - Blood sugar control - Plan of care D/w pt again Subjective:  
 
Pt seen . Denies complaints Bree Veloz Patient Active Problem List  
Diagnosis Code  History of breast cancer Z85.3  Asthma J45.909  Fe deficiency anemia D50.9  Status post ablation of atrial fibrillation Z98.890, Z86.79  
 Sick sinus syndrome (HCC) I49.5  S/P cardiac pacemaker procedure Z95.0  Long term (current) use of anticoagulants Z79.01  
 Mixed hyperlipidemia E78.2  CKD (chronic kidney disease), stage IV ([facility]) N18.4  Systolic murmur L17.0  Essential hypertension I10  
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  
 Diabetic ulcer of right midfoot associated with type 2 diabetes mellitus, with fat layer exposed ([address] E11.621, V49.673  Foot deformity, acquired, left M21.962  Foot deformity, right M21.961  Pes cavus Q66.70  Type 2 diabetes mellitus with left diabetic foot infection ([facility]) E11.628, L08.9  Traumatic hematoma of foot, left, initial encounter L30.78GT  Diabetic ulcer of left midfoot with necrosis of muscle ([address] E11.621, I63.307  Left leg cellulitis [address]  Atrial fibrillation with RVR ([facility]) I48.91  
 H/O bilateral mastectomy Z90.13  Sepsis ([address] A41.9 Past Medical History:  
Diagnosis Date  Acquired lymphedema Right arm  Arrhythmia  Asthma  Atrial fibrillation ([address] Dr. Chico Gunderson and [facility])  Cancer ([address] bilateral breast  
 Chronic kidney disease  Diabetes mellitus type II   
 Dizziness  Essential hypertension  Hyperlipidemia  Hypertension  Long term (current) use of anticoagulants  Morbid obesity ([facility] 3/14/2012  Nausea & vomiting  Neuropathy  Osteoarthritis  Pacemaker  Sick sinus syndrome ([facility] Family History Problem Relation Age of Onset  Cancer Mother  Heart Disease Mother  Alcohol abuse Father  Diabetes Brother  Hypertension Brother Social History Tobacco Use  Smoking status: Never Smoker  Smokeless tobacco: Never Used Substance Use Topics  Alcohol use: Yes Comment: rare Past Surgical History:  
Procedure Laterality Date  ABDOMEN SURGERY PROC UNLISTED    
 gastric bypass  GASTRIC BYPASS,OBESE<150CM SAW-EN-Y  7/08  
 hutcher  HX AFIB ABLATION    
 HX BREAST RECONSTRUCTION Bilateral   
 HX CARPAL TUNNEL RELEASE [address] [facility] [address] [facility] [address] [facility] RIGHT MODIFIED RADICAL   
 HX MASTECTOMY Left   
 no lymphnode removal  
 HX MOHS PROCEDURES  1995  
 right  HX ORTHOPAEDIC Right   
 knee surgery  HX PACEMAKER    
 HX PACEMAKER    
 IR INSERT TUNL CVC W PORT OVER 5 YEARS    
 IR INSERT TUNL CVC W/O PORT OVER 5 YR  5/4/2020  [address] [facility] [address] [facility]  8.21.07  
 SC [address] AND 1994  SC RELOCATION OF SKIN POCKET FOR PACEMAKER N/A 4/24/2020 RELOCATE 2 [facility] performed by Tyshawn Tucker MD at [address] CATH LAB  SC RMVL TRANSVNS PM ELTRD 1 LEAD SYS ATR/VENTR N/A 4/24/2020 Remove Pacemaker Dual Leads performed by Tyshawn Tucker MD at [facility] C[facility] CATH LAB  SC RMVL TRANSVNS PM ELTRD 1 LEAD SYS ATR/VENTR N/A 4/27/2020 REMOVE PACEMAKER DUAL LEADS performed by Tyshawn Tucker MD at [address] CATH LAB  SC TRABECULOPLASTY BY LASER SURGERY    
 US GUIDE ASP OVARIAN CYST ABD APPROACH  8.21.07  
 RIGHT Prior to Admission medications Medication Sig Start Date End Date Taking?  Authorizing Provider metOLazone (ZAROXOLYN) 2.5 mg tablet Take 1 tablet po on Mon and Thurs for worsening leg swelling. 4/13/20  Yes Rafi Luz MD  
warfarin (COUMADIN) 4 mg tablet Take 1 tablet on Tues and Sat and a half tablet the other 5 days. 4/10/20  Yes Rafi Luz MD  
bumetanide (BUMEX) 1 mg tablet Take 2 Tabs by mouth daily. 3/26/20  Yes Rafi Luz MD  
insulin glargine (Lantus U-100 Insulin) 100 unit/mL injection Inject 14 units daily 3/26/20  Yes Rafi Luz MD  
metoprolol succinate (TOPROL-XL) 100 mg tablet Take 2 Tabs by mouth daily. 3/26/20  Yes Rafi Luz MD  
dilTIAZem CD (CARDIZEM CD) 180 mg ER capsule Take 1 Cap by mouth daily. 3/22/20  Yes Quirino Ruiz MD  
hydrALAZINE (APRESOLINE) 50 mg tablet Take 1 Tab by mouth three (3) times daily. 3/22/20  Yes Quirino Ruiz MD  
acetaminophen 500 mg tab 500 mg, diphenhydrAMINE 25 mg cap 25 mg Take 2 Doses by mouth nightly. Yes Provider, Historical  
sertraline (ZOLOFT) 50 mg tablet TAKE ONE AND ONE-HALF (1 & 1/2) TABLET BY MOUTH DAILY 8/22/19  Yes Rafi Luz MD  
doxazosin (CARDURA) 4 mg tablet TAKE ONE TABLET BY MOUTH ONCE NIGHTLY 6/14/19  Yes Rafi Luz MD  
busPIRone (BUSPAR) 10 mg tablet TAKE ONE TABLET BY MOUTH TWICE A DAY 5/24/19  Yes Rafi Luz MD  
cholecalciferol, VITAMIN D3, (VITAMIN D3) 5,000 unit tab tablet Take 5,000 Units by mouth daily. Yes Provider, Historical  
vitamin A 8,000 unit capsule Take 8,000 Units by mouth daily. Yes Provider, Historical  
insulin lispro (HUMALOG) 100 unit/mL kwikpen 2 units with dinner for sugars over 200 1/3/14  Yes Rafi Luz MD  
cyanocobalamin (VITAMIN B-12) 1,000 mcg sublingual tablet Take 1,000 mcg by mouth daily. Yes Provider, Historical  
 
Allergies Allergen Reactions  Ace Inhibitors Cough  Amlodipine Swelling  Avapro [Irbesartan] Unable to Obtain  Bactrim [Sulfamethoxazole-Trimethoprim] Other (comments) Sore mouth  Captopril Cough  Carvedilol Diarrhea  Contrast Agent [Iodine] Itching [address]  Morphine Nausea and Vomiting and Swelling  Penicillins Hives Did not tolerate cefepime 3/2020  Pravachol [Pravastatin] Nausea Only  Seafood [Shellfish Containing Products] Hives  Singulair [Montelukast] Other (comments)  
  palpitations Review of Systems:  A comprehensive review of systems was negative except for that written in the History of Present Illness. 10 point review of systems obtained . All other systems negative Objective:  
Blood pressure 151/64, pulse 75, temperature 98.7 °F (37.1 °C), resp. rate 18, height 5' 10\" (1.778 m), weight 243 lb (110.2 kg), SpO2 97 %, not currently breastfeeding. Temp (24hrs), Av.2 °F (36.8 °C), Min:97.9 °F (36.6 °C), Max:98.7 °F (37.1 °C) Current Facility-Administered Medications Medication Dose Route Frequency  hydrALAZINE (APRESOLINE) tablet 25 mg  25 mg Oral TID  [START ON 2020] bumetanide (BUMEX) tablet 1 mg  1 mg Oral DAILY  0.9% sodium chloride infusion  25 mL/hr IntraVENous CONTINUOUS  
 fentaNYL citrate (PF) injection 100 mcg  100 mcg IntraVENous Multiple  midazolam (VERSED) injection 5 mg  5 mg IntraVENous Multiple  0.9% sodium chloride infusion 250 mL  250 mL IntraVENous PRN  
 WARFARIN INFORMATION NOTE (COUMADIN)   Other QPM  
 traMADoL (ULTRAM) tablet 50 mg  50 mg Oral Q6H PRN  
 gabapentin (NEURONTIN) capsule 100 mg  100 mg Oral TID  polyethylene glycol (MIRALAX) packet 17 g  17 g Oral BID  docusate sodium (COLACE) capsule 100 mg  100 mg Oral BID  alcohol 62% (NOZIN) nasal  1 Ampule  1 Ampule Topical Q12H  
 epoetin viet-epbx (RETACRIT) injection 20,000 Units  20,000 Units SubCUTAneous Q MON, WED & FRI  DAPTOmycin (CUBICIN) 900 mg in 0.9% sodium chloride 50 mL IVPB  900 mg IntraVENous Q24H  
 oxyCODONE-acetaminophen (PERCOCET) 5-325 mg per tablet 1 Tab  1 Tab Oral Q4H PRN  
 hydrALAZINE (APRESOLINE) 20 mg/mL injection 10 mg  10 mg IntraVENous Q6H PRN  
 insulin glargine (LANTUS) injection 10 Units  10 Units SubCUTAneous DAILY  busPIRone (BUSPAR) tablet 10 mg  10 mg Oral BID  sertraline (ZOLOFT) tablet 50 mg  50 mg Oral DAILY  glucose chewable tablet 16 g  4 Tab Oral PRN  
 dextrose (D50W) injection syrg 12.5-25 g  25-50 mL IntraVENous PRN  
 glucagon (GLUCAGEN) injection 1 mg  1 mg IntraMUSCular PRN  
 insulin lispro (HUMALOG) injection   SubCUTAneous AC&HS  sodium chloride (NS) flush 5-10 mL  5-10 mL IntraVENous PRN Exam:   
General:  Awake cooperative, Eyes:  Sclera anicteric. Pupils equally round and reactive to light. Mouth/Throat: Mucous membranes normal, oral pharynx clear Neck: Supple Lungs:   Clear to auscultation CV:  Regular rate and rhythm,no murmur, click, rub or gallop Abdomen:   Soft, non-tender. bowel sounds normal. non-distended Extremities: No cyanosis or edema Skin: Skin color, texture, turgor normal. no acute rash or lesions Lymph nodes: Cervical and supraclavicular normal  
Musculoskeletal: LLE BKA Lines/Devices:  Intact, no erythema, drainage or tenderness Psych: Resting , cannot assess Data Review: CBC:  
Recent Labs 05/06/20 
6409 05/04/20 
0321 WBC 6.8 8.9  
RBC 2.88* 2.77* HGB 8.3* 8.0*  
HCT 27.5* 26.4*  
* 427* GRANS  --  68  
LYMPH  --  18  
EOS  --  2  
 
CMP:  
Recent Labs 05/06/20 
0005 05/05/20 
6062 05/04/20 
0321 GLU 79 89 93  139 136  
K 4.3 4.3 4.1 * 107 104 CO2 28 25 26 BUN 20 21* 24* CREA 1.62* 1.51* 1.90* CA 8.7 9.1 8.4* AGAP 4* 7 6 BUCR 12 14 13 Lab Results Component Value Date/Time  Culture result: NO GROWTH 6 DAYS 04/21/2020 08:50 AM  
 Culture result: NO ANAEROBES ISOLATED 04/21/2020 07:20 AM  
 Culture result: LIGHT DIPHTHEROIDS (A) 04/21/2020 07:20 AM  
 Culture result: LIGHT STAPHYLOCOCCUS AUREUS (A) 04/20/2020 05:48 PM  
 Culture result: (A) 04/20/2020 05:48 PM  
  FEW STREPTOCOCCI, BETA HEMOLYTIC GROUP G Penicillin and ampicillin are drugs of choice for treatment of beta-hemolytic streptococcal infections. Susceptibility testing of penicillins and beta-lactams approved by the FDA for treatment of beta-hemolytic streptococcal infections need not be performed routinely, because nonsusceptible isolates are extremely rare. CLSI 2012 XR Results (most recent): 
Results from Hospital Encounter encounter on 04/19/20 XR CHEST PORT Narrative Portable chest single view dated 4/20/2020 Comparison chest dated 4/19/2020 History is status post pacemaker change. A single portable AP upright view of the chest was obtained. The patient is 
slightly rotated towards the right. It is difficult to accurately assess the 
size of the cardiac silhouette. There has been interval removal of the cardiac 
pacer device and leads which were present on the left. A metallic wire projects 
over the upper portion of the right hemithorax. There is a suggestion of some 
increased density in the medial aspect of the upper portion of the right 
hemithorax. This may be associated with the brachiocephalic vasculature. A 
follow-up, true frontal examination of the chest may prove useful. Surgical 
clips project over the right lung base. The costophrenic angles are sharp in 
appearance. There is evidence of postsurgical change involving the cervical 
spine. Impression IMPRESSION: 
1. Interval removal of the cardiac pacer device and leads as described above. 2. Presence of a radiopaque wire which projects over the upper portion of the 
right hemithorax. ICD-10-CM ICD-9-CM 1. Ulcer of left foot, unspecified ulcer stage ([facility] L97.529 707.15   
2. Osteomyelitis of left foot, unspecified type ([address] M86.9 730.27 3. Cellulitis of left lower extremity L03.116 682.6 4.  Disorder of cardiac pacemaker system, subsequent encounter T82. 9XXD V58.89 ELECTROPHYSIOLOGY PROCEDURE  
  996.72 ELECTROPHYSIOLOGY PROCEDURE  
   ELECTROPHYSIOLOGY PROCEDURE  
   ELECTROPHYSIOLOGY PROCEDURE  
   CANCELED: INVASIVE VASCULAR PROCEDURE  
   CANCELED: INVASIVE VASCULAR PROCEDURE Antibotic History Daptomycin IV - 5/1 
 s/p Vancomycin IV I have discussed the diagnosis with the patient and the intended plan as seen in the above orders. I have discussed medication side effects and warnings with the patient as well. Reviewed test results at length with patient Signed By: Navid Escobar MD FACP

## 2020-05-11 NOTE — TELEPHONE ENCOUNTER
Dr. Cathy Mcnamara would like to speak to  [facility] called for him     Best number to reach home  [phone]

## 2020-05-12 NOTE — PROGRESS NOTES
ID  
CK- 12,298 Cr 1.97 Daptomycin DC yesterday per Pharmacy Pt to be ritu on Vancomycin Target trough 12 to 15 D/w Dr Tomer De La O Pt to be started on IV fluids . Doing well otherwise Participating in PT For daily CBC, CMP , CK

## 2020-05-14 NOTE — DISCHARGE SUMMARY
Hospitalist Discharge Summary Patient ID: 
Toni Perez [id number] 
25 y.o. 
1960 4/19/2020 PCP on record: Nasim Hutchinson MD 
 
Admit date: 4/19/2020 Discharge date and time: 5/13/2020 DISCHARGE DIAGNOSIS: 
 
Sepsis secondary to left foot cellulitis and OM left metatarsals 3,4,and 5 S/p Left BKA 5/1/2020 Bacteremia with MSSA Endocarditis Vegetation on pacemaker lead S/p dual lead extraction and device removal 4/27/2020 by Dr. Jarek Boothe 
Anemia of chronic disease Atrial fibrillation PARAG on CKD stage IV 
 
CONSULTATIONS: 
IP CONSULT TO INFECTIOUS DISEASES 
IP CONSULT TO NEPHROLOGY 
IP CONSULT TO NEPHROLOGY 
IP CONSULT TO CARDIOLOGY 
IP CONSULT TO VASCULAR SURGERY Excerpted HPI from H&P of Todd Blair MD: 
 
60years old female from home with past medical history significant for hypertension, DM, atrial fibrillation on Coumadin, chronic lower leg wound, history of cellulitis, hyperlipidemia presented to the hospital for evaluation of left leg pain associated with fever, patient was recently admitted to the hospital for treatment of cellulitis and was discharged on March 22, 2020 and patient stated she completed course of antibiotic a week ago, patient had left foot ulcer debridement March 21, 2020, work-up in ED was significant for elevated white blood cell count 14.4, left foot x-ray done and show soft tissue at the level of the third through fifth proximal meta tarsal underlying cortical irregularity and lucency within the proximal third through fifth metatarsal may represent osteomyelitis. 
______________________________________________________________________ DISCHARGE SUMMARY/HOSPITAL COURSE:  for full details see H&P, daily progress notes, labs, consult notes. Sepsis secondary to left foot cellulitis and OM left metatarsals 3,4,and 5 S/p Left BKA 5/1/2020 by Dr. Olivai Combs - appreciate podiatrist's input; concerned with prolonged IV ABX tmt.  Dr. Bhavana Ramon will discuss with Vascular service on either a BKA vs Chopart's amputation of left side CT of lower extremity: Large soft tissue ulcer at the base of the fifth metatarsal. No definite evidence of osteomyelitis Post op care per vascular surgery team  
  Pain management challenging; in and out of Atrial fib with bradycardia and hypotension. Continue with Neurontin in hope to control phantom pain 
  Continue with percocet with ultram for breakthrough pain No IV pain medication at this time. Pt is tolerating PO without difficulty. Awaiting insurance approval for placement. 
  
Bacteremia with MSSA Endocarditis  
- continue with ID team's input; duration of the therapy, 6 weeks from 5/1 from the surgery date BC (4/19)  MSSA - 1/1 - LAC 
  BC (4/19)  MSSA - 1/1 - RAC 
  BC (4/21)  NG 
  WC (4/20) Light  MSSA,few Strep Gp G beta hemolytic 
  ECHO - possible vegetation on AV 
  Pacemaker present - no swelling , erythema  
  MIKE - mobile vegetation on pacer lead; the lead has been extracted on 4/27 IV vanc was changed IV Daptomycin due to worsening of creat Weekly CBC, CMP & ESR/ CRP every other week  Fax reports to [phone], call with abnormal results at [phone]. Call with antibiotic related adverse events Had Simone catheter placement 5/4 
  
Vegetation on pacemaker lead S/p dual lead extraction and device removal 4/27/2020 by Dr. Pasha Espinal 
Anemia of chronic disease Atrial fibrillation Hypercoagulable state secondary to afib HTN 
SSS Continue Cardizem, doxazosin, hydralazine HR down to 40-50's at times, BB has been d/c'd 
   Coumadin per pharmacy; daily INR 
  
PARAG on CKD stage IV 
- creat trending down  
 -s/p gentle IV hydration Avoid nephrotoxic agents. Appreciate nephrology input; 
 
 
 
_______________________________________________________________________ Patient seen and examined by me on discharge day. Pertinent Findings: 
Gen:    Not in distress Chest: Clear lungs CVS:   Regular rhythm. No edema Abd:  Soft, not distended, not tender Neuro:  Alert 
_______________________________________________________________________ DISCHARGE MEDICATIONS:  
Discharge Medication List as of 5/7/2020  1:15 PM  
  
START taking these medications Details DAPTOmycin (CUBICIN) IVPB 900 mg by IntraVENous route every twenty-four (24) hours for 35 days. , Print, Disp-34 Dose, R-0  
  
  
CONTINUE these medications which have CHANGED Details  
insulin glargine (Lantus U-100 Insulin) 100 unit/mL injection 10 Units by SubCUTAneous route nightly. Inject 14 units daily, Normal, Disp-10 mL, R-5  
  
  
CONTINUE these medications which have NOT CHANGED Details  
warfarin (COUMADIN) 4 mg tablet Take 1 tablet on Tues and Sat and a half tablet the other 5 days. , No Print, Disp-60 Tab, R-5  
  
bumetanide (BUMEX) 1 mg tablet Take 2 Tabs by mouth daily. , No Print, Disp-60 Tab, R-3  
  
hydrALAZINE (APRESOLINE) 50 mg tablet Take 1 Tab by mouth three (3) times daily. , Print, Disp-90 Tab, R-4  
  
sertraline (ZOLOFT) 50 mg tablet TAKE ONE AND ONE-HALF (1 & 1/2) TABLET BY MOUTH DAILY, Normal, Disp-45 Tab, R-5  
  
busPIRone (BUSPAR) 10 mg tablet TAKE ONE TABLET BY MOUTH TWICE A DAY, Normal, Disp-60 Tab, R-10  
  
insulin lispro (HUMALOG) 100 unit/mL kwikpen 2 units with dinner for sugars over 200, Historical Med, Disp-1 Package, R-0  
  
  
STOP taking these medications  
  
 metOLazone (ZAROXOLYN) 2.5 mg tablet Comments:  
Reason for Stopping:   
   
 metoprolol succinate (TOPROL-XL) 100 mg tablet Comments:  
Reason for Stopping:   
   
 dilTIAZem CD (CARDIZEM CD) 180 mg ER capsule Comments:  
Reason for Stopping:   
   
 acetaminophen 500 mg tab 500 mg, diphenhydrAMINE 25 mg cap 25 mg Comments:  
Reason for Stopping:   
   
 doxazosin (CARDURA) 4 mg tablet Comments:  
Reason for Stopping:   
   
 cholecalciferol, VITAMIN D3, (VITAMIN D3) 5,000 unit tab tablet Comments:  
Reason for Stopping: vitamin A 8,000 unit capsule Comments:  
Reason for Stopping:   
   
 cyanocobalamin (VITAMIN B-12) 1,000 mcg sublingual tablet Comments:  
Reason for Stopping:   
   
  
 
 
 
Patient Follow Up Instructions: Activity: Activity as tolerated Diet: Resume previous diet Follow-up Information Follow up With Specialties Details Why Contact [facility]   [facility] [place], [address] [facility] 
[phone] Jyoti Garcia MD Internal Medicine   [address] [facility] 
[address] [facility] [address] [phone] [facility]  This is your home health agency once discharge from [address] [facility] [address] [phone] [address] [place] [address], Infusion Therapy  This is your IV infusion company [address] [facility]. [address] [facility] [address] [phone] [address] [facility] [address]   [address] [address]  
  
 
________________________________________________________________ Risk of deterioration: Low 
 
Condition at Discharge:  Stable 
__________________________________________________________________ Disposition [facility]/LTC 
 
____________________________________________________________________ Code Status: Full Code 
___________________________________________________________________ Total time in minutes spent coordinating this discharge (includes going over instructions, follow-up, prescriptions, and preparing report for sign off to her PCP) :  35 minutes Signed: 
Rolando Irene MD

## 2020-05-18 NOTE — TELEPHONE ENCOUNTER
Two pt identifiers confirmed. Spoke to Cl[place] (nurse) and was informed to contact pt about appt reminder. Informed Tigre is there a way call can be transferred to pt. Tigre informed LPN cannot call to request for pt will need to contact them via pt's phone number. Tigre verbalized understanding of information discussed w/ no further questions at this time. Called, left vm in regards to VV appt reminder for 05/19 @3pm for pt to return call to office if she has any questions. HIPAA form is not completed to contact anyone else. Speak to pt only.

## 2020-05-27 NOTE — TELEPHONE ENCOUNTER
Dr Sage Rolle called pt will be discharged from [address] [place] [address] for rehab for below knee amputation. She will be going home with with Home Health, coumadin checks will be done Monday and Thursdays. Dr Sage Rolle will send his last note and discharge summary.

## 2020-06-02 NOTE — TELEPHONE ENCOUNTER
Anthony Cosme from [facility] ([phone]) called and wanted to let Dr. Billy Hanna know that the pt has refused all PT and OT services. The pt has only allowed the nurse in.

## 2020-06-02 NOTE — TELEPHONE ENCOUNTER
Spoke with patient and after verifying name and date of birth of patient gave patient test results and any instructions in note per provider. Patient stated understanding. Left message for [place] at [facility] on voice mail.

## 2020-06-02 NOTE — TELEPHONE ENCOUNTER
Crystal from [address] [phone] called with PT/INR. INR 1.5    PT 18.2    Pt is currently taking a 1mg tablet with directions of 1.5 tablets daily.

## 2020-06-08 NOTE — TELEPHONE ENCOUNTER
Crystal verified warfarin 1 mg tablets 2 tablets daily. She will notify pt of change warfarin 1 mg tablets to 3 tablets on Mon and Fri and 2 tablets the other 5 days.  Recheck 2 weeks

## 2020-06-08 NOTE — TELEPHONE ENCOUNTER
[place] from [facility] [phone] called with PT/INR. INR 1.7    PT 20.3    Pt is currently taking warfarin 1 mg to 2 tabs a day.

## 2020-06-08 NOTE — TELEPHONE ENCOUNTER
Inform patient INR is too low. Confirm current dose of warfarin 1 mg tablets 2 tablets daily . Change warfarin 1 mg tablets to 3 tablets on Mon and Fri and 2 tablets the other 5 days. Repeat INR in  2 weeks.

## 2020-06-10 NOTE — TELEPHONE ENCOUNTER
Pls call Adry Matthews with [facility]     Needs end of therapy       Call was dropped   Best number to reach him is [phone]

## 2020-06-10 NOTE — TELEPHONE ENCOUNTER
Two pt identifiers confirmed. Spoke to Jesse Hui in regards to order clarification. Informed Jesse Hui will return call was verified on end date. Jesse Hui verbalized understanding of information discussed w/ no further questions at this time.

## 2020-06-11 PROBLEM — E11.628 TYPE 2 DIABETES MELLITUS WITH LEFT DIABETIC FOOT INFECTION (HCC): Status: RESOLVED | Noted: 2019-10-29 | Resolved: 2020-01-01

## 2020-06-11 PROBLEM — L97.422 DIABETIC ULCER OF LEFT HEEL ASSOCIATED WITH TYPE 2 DIABETES MELLITUS, WITH FAT LAYER EXPOSED (HCC): Chronic | Status: RESOLVED | Noted: 2019-06-21 | Resolved: 2020-01-01

## 2020-06-11 PROBLEM — L97.423: Status: RESOLVED | Noted: 2020-03-03 | Resolved: 2020-01-01

## 2020-06-11 PROBLEM — M21.962 FOOT DEFORMITY, ACQUIRED, LEFT: Status: RESOLVED | Noted: 2019-09-24 | Resolved: 2020-01-01

## 2020-06-11 PROBLEM — S90.32XA: Status: RESOLVED | Noted: 2020-02-25 | Resolved: 2020-01-01

## 2020-06-11 PROBLEM — L08.9 TYPE 2 DIABETES MELLITUS WITH LEFT DIABETIC FOOT INFECTION (HCC): Status: RESOLVED | Noted: 2019-10-29 | Resolved: 2020-01-01

## 2020-06-11 PROBLEM — E11.621 DIABETIC ULCER OF LEFT HEEL ASSOCIATED WITH TYPE 2 DIABETES MELLITUS, WITH FAT LAYER EXPOSED (HCC): Chronic | Status: RESOLVED | Noted: 2019-06-21 | Resolved: 2020-01-01

## 2020-06-11 PROBLEM — Z89.512 LEFT BELOW-KNEE AMPUTEE (HCC): Status: ACTIVE | Noted: 2020-01-01

## 2020-06-11 PROBLEM — L03.116 LEFT LEG CELLULITIS: Chronic | Status: RESOLVED | Noted: 2020-03-08 | Resolved: 2020-01-01

## 2020-06-11 PROBLEM — E11.621: Status: RESOLVED | Noted: 2020-03-03 | Resolved: 2020-01-01

## 2020-06-11 PROBLEM — A41.9 SEPSIS (HCC): Status: RESOLVED | Noted: 2020-04-20 | Resolved: 2020-01-01

## 2020-06-11 NOTE — TELEPHONE ENCOUNTER
Two pt identifiers confirmed. Spoke to [employer] in regards to order clarification for pt. Per Dr. Tian Auguste:    Please Ask HH to pull PICC . thanks      Electronically signed by Denia Diaz MD at 06/11/20 11422 Neeru Luke verbalized understanding of information discussed w/ no further questions at this time.

## 2020-06-12 PROBLEM — I48.91 ATRIAL FIBRILLATION WITH RVR (HCC): Status: RESOLVED | Noted: 2020-03-08 | Resolved: 2020-01-01

## 2020-06-12 NOTE — PROGRESS NOTES
Fernanda Carpio  Identified pt with two pt identifiers(name and ). Chief Complaint Patient presents with  Yeast Infection Reviewed record In preparation for visit and have obtained necessary documentation. Advanced directive / living will on file. 1. Have you been to the ER, urgent care clinic or hospitalized since your last visit? [facility] 2. Have you seen or consulted any other health care providers outside of the [address] [place] [address] since your last visit? Include any pap smears or colon screening. Home Health Vitals reviewed with provider. Health Maintenance reviewed:  
 
Health Maintenance Due Topic  PAP AKA CERVICAL CYTOLOGY  Colonoscopy Wt Readings from Last 3 Encounters:  
20 243 lb (110.2 kg) 20 251 lb (113.9 kg) 20 238 lb 8.6 oz (108.2 kg) Temp Readings from Last 3 Encounters:  
20 97.8 °F (36.6 °C)  
20 97.6 °F (36.4 °C) (Oral) 20 97.7 °F (36.5 °C) BP Readings from Last 3 Encounters:  
20 143/63  
20 136/74  
20 125/55 Pulse Readings from Last 3 Encounters:  
20 87  
20 79  
20 82 There were no vitals filed for this visit. Learning Assessment:  
:  
 
 
Learning Assessment 3/25/2014 PRIMARY LEARNER Patient HIGHEST LEVEL OF EDUCATION - PRIMARY LEARNER  2 YEARS OF COLLEGE  
BARRIERS PRIMARY LEARNER NONE  
CO-LEARNER CAREGIVER No  
PRIMARY LANGUAGE ENGLISH  NEED No  
LEARNER PREFERENCE PRIMARY DEMONSTRATION  
LEARNING SPECIAL TOPICS Does does a pacemaker any noise ANSWERED BY patient RELATIONSHIP SELF Depression Screening:  
:  
 
 
3 most recent PHQ Screens 2020 Little interest or pleasure in doing things Not at all Feeling down, depressed, irritable, or hopeless Not at all Total Score PHQ 2 0 Fall Risk Assessment:  
:  
 
 
Fall Risk Assessment, last 12 mths 8/15/2019 Able to walk? Yes Fall in past 12 months? No  
  
 
Abuse Screening:  
:  
 
 
Abuse Screening Questionnaire 8/15/2019 Do you ever feel afraid of your partner? Kennedy Barnard Are you in a relationship with someone who physically or mentally threatens you? Kennedy Barnard Is it safe for you to go home? Y  
  
 
ADL Screening:  
:  
 
 

## 2020-06-12 NOTE — PROGRESS NOTES
Tracey Correa is a 60 y.o. female evaluated via audio only technology on 6/12/2020. Consent: She and/or her health care decision maker is aware that she may receive a bill for this audio only encounter, depending on her insurance coverage, and has provided verbal consent to proceed: Yes I communicated with the patient and/or health care decision maker about the nature and details of the following: 
Assessment & Plan:  
Diagnoses and all orders for this visit: 1. Vulvovaginitis due to yeast 
-     fluconazole (DIFLUCAN) 150 mg tablet; Take 1 po today and repeat in one week. 2.  Controlled type 2 diabetes mellitus with stage 4 chronic kidney disease, with long term current use of insulin (HCC) 
-     insulin glargine (Lantus U-100 Insulin) 100 unit/mL injection; 10 Units by SubCUTAneous route nightly. 3. CKD (chronic kidney disease), stage IV ([address] -     bumetanide (BUMEX) 1 mg tablet; Take 1 Tab by mouth daily. 4. Essential hypertension 
-     hydrALAZINE (APRESOLINE) 100 mg tablet; Take 1 Tab by mouth three (3) times daily. 12 
Subjective:  
Tracey Correa is a 60 y.o. female who was seen for No chief complaint on file. She complains of vaginal itching, burning and thick discharge. She has tried OTC Monistat and Lotrimin without improvement. 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 She has diabetes and blood sugars since home from rehab have all been <139. Medication changes made in hospital and at rehab were updated on medication list.  
 
ROS Lab Results Component Value Date/Time Hemoglobin A1c 7.5 (H) 03/08/2020 07:09 PM  
 Hemoglobin A1c (POC) 7.2 07/10/2019 03:14 PM  
 
Lab Results Component Value Date/Time Microalb/Creat ratio (ug/mg creat.) Comment (A) 11/13/2019 04:22 PM  
 
Lab Results Component Value Date/Time  Cholesterol, total 141 11/11/2019 08:47 AM  
 HDL Cholesterol 32 (L) 11/11/2019 08:47 AM  
 LDL, calculated 82 11/11/2019 08:47 AM  
 VLDL, calculated 27 11/11/2019 08:47 AM  
 Triglyceride 137 11/11/2019 08:47 AM  
 
Lab Results Component Value Date/Time Sodium 141 05/26/2020 05:30 AM  
 Potassium 3.7 05/26/2020 05:30 AM  
 Chloride 107 05/26/2020 05:30 AM  
 CO2 29 05/26/2020 05:30 AM  
 Anion gap 5 05/26/2020 05:30 AM  
 Glucose 83 05/26/2020 05:30 AM  
 BUN 23 (H) 05/26/2020 05:30 AM  
 Creatinine 1.97 (H) 05/26/2020 05:30 AM  
 BUN/Creatinine ratio 12 05/26/2020 05:30 AM  
 GFR est AA 31 (L) 05/26/2020 05:30 AM  
 GFR est non-AA 26 (L) 05/26/2020 05:30 AM  
 Calcium 8.8 05/26/2020 05:30 AM  
 Bilirubin, total 0.3 05/21/2020 04:10 AM  
 Alk. phosphatase 88 05/21/2020 04:10 AM  
 Protein, total 5.8 (L) 05/21/2020 04:10 AM  
 Albumin 2.3 (L) 05/21/2020 04:10 AM  
 Globulin 3.5 05/21/2020 04:10 AM  
 A-G Ratio 0.7 (L) 05/21/2020 04:10 AM  
 ALT (SGPT) 56 05/21/2020 04:10 AM  
 
 
I affirm this is a Patient-Initiated Episode with a Patient who has not had a related appointment within my department in the past 7 days or scheduled within the next 24 hours. Total Time: minutes: 11-20 minutes Note: not billable if this call serves to triage the patient into an appointment for the relevant concern Brian Freeman MD

## 2020-06-15 NOTE — TELEPHONE ENCOUNTER
[place] from [facility] [phone] called with PT/INR. INR 2.2    PT 25.9     Pt is currently taking warfarin 1 mg to 3 tablets on Mon and Fri and 2 tablets the other 5 days.

## 2020-06-15 NOTE — TELEPHONE ENCOUNTER
No change in warfarin. Repeat PT/INR in 2 weeks to assure stability. If therapeutic, can go to monthly after that.

## 2020-06-22 NOTE — TELEPHONE ENCOUNTER
Change warfarin 1 mg tablets to 2 tablets on Mon, Wed, Fri and 3 tablets the other 4 days. Repeat INR in 2 weeks. Remind her to be consistent about intake of high Vit K containing foods.

## 2020-06-22 NOTE — TELEPHONE ENCOUNTER
----- Message from Mikayla Morris sent at 2020  3:30 PM EDT -----  Regarding: Dr. Odell Lantigua  Appointment not available      : 58    Caller's first and last name and relationship to patient (if not the patient): n/a    Best contact [phone]    Preferred date and time: as soon as possible; virtual visit    Scheduled appointment date and time: n/a    Reason for appointment: NP est pcp; need to get port taken out; central line is blocked and shoulder is itching and hurting    Details to clarify the request: Pt advised to seek appropriate medical care.

## 2020-06-22 NOTE — TELEPHONE ENCOUNTER
Verified patients name and date of birth. Crystal with [facility] called with PT/INR results of PT/INR. PT 14.8 and INR 1.2. confirmed that patient is taking warfarin 1 mg tab 3 on m,f and 2 tabs the other 5 days. Return number [phone]. Can leave a message.

## 2020-06-25 NOTE — TELEPHONE ENCOUNTER
----- Message from Rosales Rosas sent at 6/24/2020  8:01 PM EDT -----  Regarding: Dr. Mehul Weinberg  Patient return call    Caller's first and last name and relationship (if not the patient): Neda Mansfield, Self      Best contact number(s):  [phone]      Whose call is being returned:  Pt not sure who left a message. Details to clarify the request:  Pt is returning the practice call. Pt states she couldn't understand the message very well.     Thanks,  Rosales Rosas

## 2020-06-25 NOTE — PROGRESS NOTES
Epi Bautista  Identified pt with two pt identifiers(name and ). Chief Complaint Patient presents with  Diabetes  Hypertension  Chronic Kidney Disease  Irregular Heart Beat Reviewed record In preparation for visit and have obtained necessary documentation. Advanced directive / living will on file. 1. Have you been to the ER, urgent care clinic or hospitalized since your last visit? No  
 
2. Have you seen or consulted any other health care providers outside of the [address] [place] [address] since your last visit? Include any pap smears or colon screening. No 
 
Vitals reviewed with provider. Health Maintenance reviewed:  
 
Health Maintenance Due Topic  PAP AKA CERVICAL CYTOLOGY  Colonoscopy Wt Readings from Last 3 Encounters:  
20 243 lb (110.2 kg) 20 251 lb (113.9 kg) 20 238 lb 8.6 oz (108.2 kg) Temp Readings from Last 3 Encounters:  
20 97.8 °F (36.6 °C)  
20 97.6 °F (36.4 °C) (Oral) 20 97.7 °F (36.5 °C) BP Readings from Last 3 Encounters:  
20 143/63  
20 136/74  
20 125/55 Pulse Readings from Last 3 Encounters:  
20 87  
20 79  
20 82 Vitals:  
 20 1100 PainSc:   0 - No pain Learning Assessment:  
:  
 
 
Learning Assessment 3/25/2014 PRIMARY LEARNER Patient HIGHEST LEVEL OF EDUCATION - PRIMARY LEARNER  2 YEARS OF COLLEGE  
BARRIERS PRIMARY LEARNER NONE  
CO-LEARNER CAREGIVER No  
PRIMARY LANGUAGE ENGLISH  NEED No  
LEARNER PREFERENCE PRIMARY DEMONSTRATION  
LEARNING SPECIAL TOPICS Does does a pacemaker any noise ANSWERED BY patient RELATIONSHIP SELF Depression Screening:  
:  
 
 
3 most recent PHQ Screens 2020 Little interest or pleasure in doing things Not at all Feeling down, depressed, irritable, or hopeless Not at all Total Score PHQ 2 0 Fall Risk Assessment:  
:  
 
 
 Fall Risk Assessment, last 12 mths 8/15/2019 Able to walk? Yes Fall in past 12 months? No  
  
 
Abuse Screening:  
:  
 
 
Abuse Screening Questionnaire 8/15/2019 Do you ever feel afraid of your partner? Pelon Adame Are you in a relationship with someone who physically or mentally threatens you? Pelon Adame Is it safe for you to go home? Y  
  
 
ADL Screening:  
:  
 
 
ADL Assessment 6/12/2020 Feeding yourself No Help Needed Getting from bed to chair Help Needed Getting dressed No Help Needed Bathing or showering No Help Needed Walk across the room (includes cane/walker) No Help Needed Using the telphone No Help Needed Taking your medications No Help Needed Preparing meals No Help Needed Managing money (expenses/bills) No Help Needed Moderately strenuous housework (laundry) No Help Needed Shopping for personal items (toiletries/medicines) No Help Needed Shopping for groceries No Help Needed Driving No Help Needed Climbing a flight of stairs Help Needed Getting to places beyond walking distances No Help Needed

## 2020-06-25 NOTE — TELEPHONE ENCOUNTER
Two pt identifiers confirmed. Pt is scheduled at [facility] 06/26 @1000. MOB1 for internal catheter removal. Katharine states, will contact pt. Katharine verbalized understanding of information discussed w/ no further questions at this time.

## 2020-06-25 NOTE — TELEPHONE ENCOUNTER
----- Message from Colt Iqbal sent at 6/25/2020  3:28 PM EDT -----  Regarding: Dr. Ann Damian Message/Vendor Calls    Caller's first and last name: Azfletcher [facility])      Reason for call: Requesting a call back in regards to pt right chest PICC line stated [facility] stop coming to take care of the pt and it hasn't been changed.        Callback required yes/no and why:Yes      Best contact number(s):[phone]      Details to clarify the request:      Colt Iqbal

## 2020-06-25 NOTE — PROGRESS NOTES
Ziggy Guzman is a 60 y.o. female evaluated via audio only technology on 6/25/2020. Consent: She and/or her health care decision maker is aware that she may receive a bill for this audio only encounter, depending on her insurance coverage, and has provided verbal consent to proceed: Yes I communicated with the patient and/or health care decision maker about the nature and details of the following: 
Assessment & Plan:  
{A/P PLUS DISPO [facility]:[id number]} Follow up in 4 months with physician for DM, A fib, SSS, chol, HTN,  
12 Subjective:  
Ziggy Guzman is a 60 y.o. female who was seen for No chief complaint on file. She presents for follow up of diabetes mellitus with neuropathy, nephropathy/ CKD and retinopathy, hypertension. , hyperlipidemia, Atrial fibrillation, SSS, PM and obesity. She is post left BKA and is still being treated for ulcer on right foot. Diet and Lifestyle: generally follows a low fat low cholesterol diet, generally follows a low sodium diet, follows a diabetic diet regularly, sedentary, nonsmoker Diabetic ROS - medication compliance: compliant all of the time,  
   home glucose monitoring: not done recently 
   home BP Monitoring: not done recently. further diabetic ROS: no polyuria or polydipsia, no unusual visual symptoms, no hypoglycemia, no medication side effects noted, decreased foot sensation. Cardiovascular ROS: 
She denies palpitations, exertional chest pressure/discomfort, claudication, lower extremity edema, dyspnea on exertion, dizziness Patient Active Problem List  
Diagnosis Code  History of breast cancer Z85.3  Asthma J45.909  Fe deficiency anemia D50.9  Status post ablation of atrial fibrillation Z98.890, Z86.79  
 Sick sinus syndrome (HCC) I49.5  S/P cardiac pacemaker procedure Z95.0  Long term (current) use of anticoagulants Z79.01  
 Mixed hyperlipidemia E78.2  CKD (chronic kidney disease), stage IV (HCC) N18.4  Systolic murmur J71.0  Essential hypertension I10  
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  
 H/O bilateral mastectomy Z90.13  Left below-knee amputee [facility]) V83.078 Past Medical History:  
Diagnosis Date  Acquired lymphedema Right arm  Arrhythmia  Asthma  Atrial fibrillation ([facility] Dr. Gay Litten and Dr. Rai Alu [facility])  Cancer ([address] bilateral breast  
 Chronic kidney disease  Diabetes mellitus type II   
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  
Procedure Laterality Date  ABDOMEN SURGERY PROC UNLISTED    
 gastric bypass  GASTRIC BYPASS,OBESE<150CM SAW-EN-Y  7/08  
 hutcher  HX AFIB ABLATION    
 HX BREAST RECONSTRUCTION Bilateral   
 HX CARPAL TUNNEL RELEASE [address] [facility] [address] RIGHT MODIFIED RADICAL   
 HX MASTECTOMY Left   
 no lymphnode removal  
 HX MOHS PROCEDURES  1995  
 right  HX ORTHOPAEDIC Right   
 knee surgery  HX PACEMAKER    
 HX PACEMAKER    
 IR INSERT TUNL CVC W PORT OVER 5 YEARS    
 IR INSERT TUNL CVC W/O PORT OVER 5 YR  5/4/2020  [address] [facility] [address] [place] [address]  8.21.07  
 MI [address] 9462 AND 1994  MI RELOCATION OF SKIN POCKET FOR PACEMAKER N/A 4/24/2020 RELOCATE 2 [facility] performed by Raiza Edwards MD at [address] [address] CATH LAB  MI RMVL TRANSVNS PM ELTRD 1 LEAD SYS ATR/VENTR N/A 4/24/2020 Remove Pacemaker Dual Leads performed by Raiza Edwards MD at [facility] CARDIAC CATH LAB  MI RMVL TRANSVNS PM ELTRD 1 LEAD SYS ATR/VENTR N/A 4/27/2020 REMOVE PACEMAKER DUAL LEADS performed by Raiza Edwards MD at [address] [facility] [address] CATH LAB  MI TRABECULOPLASTY BY LASER SURGERY    
 US GUIDE ASP OVARIAN CYST ABD APPROACH  8.21.07  
 RIGHT Social History Socioeconomic History  Marital status:  Spouse name: Not on file  Number of children: Not on file  Years of education: Not on file  Highest education level: Not on file Tobacco Use  Smoking status: Never Smoker  Smokeless tobacco: Never Used Substance and Sexual Activity  Alcohol use: Yes Comment: rare  Drug use: No  
 
Family History Problem Relation Age of Onset  Cancer Mother  Heart Disease Mother  Alcohol abuse Father  Diabetes Brother  Hypertension Brother Allergies Allergen Reactions  Ace Inhibitors Cough  Amlodipine Swelling  Avapro [Irbesartan] Unable to Obtain  Bactrim [Sulfamethoxazole-Trimethoprim] Other (comments) Sore mouth  Captopril Cough  Carvedilol Diarrhea  Contrast Agent [Iodine] Itching [address]  Morphine Nausea and Vomiting and Swelling  Penicillins Hives Did not tolerate cefepime 3/2020  Pravachol [Pravastatin] Nausea Only  Seafood [Shellfish Containing Products] Hives  Singulair [Montelukast] Other (comments)  
  palpitations Current Outpatient Medications Medication Sig Dispense Refill  warfarin (COUMADIN) 1 mg tablet 2 tablets on Mon, Wed and Fri and 3 tablets the other 4 days  gabapentin (NEURONTIN) 100 mg capsule Take 100 mg by mouth three (3) times daily.  insulin glargine (Lantus U-100 Insulin) 100 unit/mL injection 10 Units by SubCUTAneous route nightly. 10 mL 5  
 hydrALAZINE (APRESOLINE) 100 mg tablet Take 1 Tab by mouth three (3) times daily. 90 Tab 11  
 bumetanide (BUMEX) 1 mg tablet Take 1 Tab by mouth daily. 60 Tab 3  
 fluconazole (DIFLUCAN) 150 mg tablet Take 1 po today and repeat in one week. 2 Tab 0  
 sertraline (ZOLOFT) 50 mg tablet TAKE ONE AND ONE-HALF (1 & 1/2) TABLET BY MOUTH DAILY 45 Tab 5  
 busPIRone (BUSPAR) 10 mg tablet TAKE ONE TABLET BY MOUTH TWICE A DAY 60 Tab 10  
 insulin lispro (HUMALOG) 100 unit/mL kwikpen 2 units with dinner for sugars over 200 1 Package 0  
 Cartia  mg capsule Take 180 mg by mouth daily. Review of Systems Constitutional: Negative for malaise/fatigue and weight loss. Gastrointestinal: Negative for constipation and diarrhea. Musculoskeletal: Negative for back pain and joint pain. Neurological: Negative for tingling and focal weakness. Lab Results Component Value Date/Time Hemoglobin A1c 7.5 (H) 03/08/2020 07:09 PM  
 Hemoglobin A1c (POC) 7.2 07/10/2019 03:14 PM  
 
Lab Results Component Value Date/Time Microalb/Creat ratio (ug/mg creat.) Comment (A) 11/13/2019 04:22 PM  
 
Lab Results Component Value Date/Time Cholesterol, total 141 11/11/2019 08:47 AM  
 HDL Cholesterol 32 (L) 11/11/2019 08:47 AM  
 LDL, calculated 82 11/11/2019 08:47 AM  
 VLDL, calculated 27 11/11/2019 08:47 AM  
 Triglyceride 137 11/11/2019 08:47 AM  
 
Lab Results Component Value Date/Time Sodium 141 05/26/2020 05:30 AM  
 Potassium 3.7 05/26/2020 05:30 AM  
 Chloride 107 05/26/2020 05:30 AM  
 CO2 29 05/26/2020 05:30 AM  
 Anion gap 5 05/26/2020 05:30 AM  
 Glucose 83 05/26/2020 05:30 AM  
 BUN 23 (H) 05/26/2020 05:30 AM  
 Creatinine 1.97 (H) 05/26/2020 05:30 AM  
 BUN/Creatinine ratio 12 05/26/2020 05:30 AM  
 GFR est AA 31 (L) 05/26/2020 05:30 AM  
 GFR est non-AA 26 (L) 05/26/2020 05:30 AM  
 Calcium 8.8 05/26/2020 05:30 AM  
 Bilirubin, total 0.3 05/21/2020 04:10 AM  
 Alk. phosphatase 88 05/21/2020 04:10 AM  
 Protein, total 5.8 (L) 05/21/2020 04:10 AM  
 Albumin 2.3 (L) 05/21/2020 04:10 AM  
 Globulin 3.5 05/21/2020 04:10 AM  
 A-G Ratio 0.7 (L) 05/21/2020 04:10 AM  
 ALT (SGPT) 56 05/21/2020 04:10 AM  
 AST (SGOT) 25 05/21/2020 04:10 AM  
 
 
 
I affirm this is a Patient-Initiated Episode with a Patient who has not had a related appointment within my department in the past 7 days or scheduled within the next 24 hours. Total Time: {minutes:64589::\"5-10 minutes\"} Note: not billable if this call serves to triage the patient into an appointment for the relevant concern Blanca Rich MD

## 2020-06-25 NOTE — ROUTINE PROCESS
Have you been tested for COVID-19 in the past 7 days? No    Do you have a personal history of COVID-19 within the past 28 days? If Yes, What was the method of testing: clinical assumption or test result? No    Have you had close contact with a known to be positive COVID-19 patient within the past 14 days? No    Are you a healthcare worker or ? No  If Yes, have you been exposed to COVID-19 without proper PPE? Do you live in a SNF, adult home or other institutional setting? No  If Yes, have they experienced a flood of COVID-19 positive patients?     In the past 2-14 days have you had any of the following symptoms    Cough  No   New onset Shortness of breath or difficulty breathing  No    Or at least two of these symptoms:   Fever greater than 100 F  No   Chills  No   Repeated shaking with chills  No   Muscle pain  No   Headache  No   Sore throat  No   New loss of taste or smell  No   New onset diarrhea  No

## 2020-06-25 NOTE — PROGRESS NOTES
Shonna Mejias is a 60 y.o. female who was seen by synchronous (real-time) audio-video technology on 6/25/2020. Consent: Shonna Mejias, who was seen by synchronous (real-time) audio-video technology, and/or her healthcare decision maker, is aware that this patient-initiated, Telehealth encounter on 6/25/2020 is a billable service, with coverage as determined by her insurance carrier. She is aware that she may receive a bill and has provided verbal consent to proceed: Yes. Assessment & Plan:  
Diagnoses and all orders for this visit: 1. Controlled type 2 diabetes mellitus with stage 4 chronic kidney disease, with long-term current use of insulin ([address] Blood sugars have been acceptably controlled. Goal 7.5 or less 
-     HEMOGLOBIN A1C WITH EAG; Future -     METABOLIC PANEL, BASIC; Future 2. Diabetic polyneuropathy associated with type 2 diabetes mellitus ([address] Post left BKA and right healed foot ulcer 3. Diabetic ulcer of right midfoot associated with type 2 diabetes mellitus, with fat layer exposed ([address] Ulcer has healed. 4. Essential hypertension Hypertension is controlled. 5. Mixed hyperlipidemia Hyperlipidemia is controlled 6. PAF (paroxysmal atrial fibrillation) ([address] Rate controlled 7. Sick sinus syndrome ([address] Pacemaker removed due to vegetation on pacer leads. Waiting 6 weeks after finished antibiotic to replace. 8. Morbid obesity with BMI of 40.0-44.9, adult ([address] Discussed using smaller plate for portion control, keeping a food diary for awareness of food consumed, checking weight often, and increasing physical activity. Will re-evaluate next visit. 9. Left below-knee amputee ([address] Follow up in 4 months for DM, A fib, SSS, chol, HTN, non-fasting lab one week prior I spent at least 25 minutes on this visit with this established patient.  
 
Subjective:  
Shonna Mejias is a 60 y.o. female who was seen for Diabetes; Hypertension; Chronic Kidney Disease; and Irregular Heart Beat She presents for follow up of diabetes mellitus with neuropathy, nephropathy/ CKD and retinopathy, hypertension. , hyperlipidemia, Atrial fibrillation, SSS, PM and obesity. She is post left BKA and is still being treated for ulcer on right foot. Diet and Lifestyle: generally follows a low fat low cholesterol diet, generally follows a low sodium diet, follows a diabetic diet regularly, sedentary, nonsmoker Diabetic ROS - medication compliance: compliant all of the time,  
   home glucose monitoring: not done recently 
   home BP Monitoring: not done recently. further diabetic ROS: no polyuria or polydipsia, no unusual visual symptoms, no hypoglycemia, no medication side effects noted, decreased foot sensation. Cardiovascular ROS: 
She denies palpitations, exertional chest pressure/discomfort, claudication, lower extremity edema, dyspnea on exertion, dizziness Patient Active Problem List  
Diagnosis Code  History of breast cancer Z85.3  Asthma J45.909  Fe deficiency anemia D50.9  Status post ablation of atrial fibrillation Z98.890, Z86.79  
 Sick sinus syndrome ([facility]) I49.5  S/P cardiac pacemaker procedure Z95.0  Long term (current) use of anticoagulants Z79.01  
 Mixed hyperlipidemia E78.2  CKD (chronic kidney disease), stage IV ([facility]) N18.4  Systolic murmur O11.7  Essential hypertension I10  
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  
 H/O bilateral mastectomy Z90.13  Left below-knee amputee [facility]) C88.698 Past Medical History:  
Diagnosis Date  Acquired lymphedema Right arm  Arrhythmia  Asthma  Atrial fibrillation ([facility] Dr. Dejah Gonzalez and Dr. Chino alves [facility])  Cancer ([address] bilateral breast  
 Chronic kidney disease  Diabetes mellitus type II   
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  
Procedure Laterality Date  ABDOMEN SURGERY PROC UNLISTED    
 gastric bypass  [facility]IC BYPASS,OBESE<150CM [facility]  [facility]  
 [facility] HX AFIB ABLATION    
 HX BREAST RECONSTRUCTION Bilateral   
 HX CARPAL TUNNEL RELEASE [facility] [address] RIGHT MODIFIED RADICAL   
 HX MASTECTOMY Left   
 no lymphnode removal  
 HX MOHS PROCEDURES  1995  
 right  HX ORTHOPAEDIC Right   
 knee surgery  HX PACEMAKER    
 HX PACEMAKER    
 IR INSERT TUNL CVC W PORT OVER 5 YEARS    
 IR INSERT TUNL CVC W/O PORT OVER 5 YR  5/4/2020  [address]  8.21.07  
 ID [facility] [id number] AND 1994  ID RELOCATION OF SKIN POCKET FOR PACEMAKER N/A 4/24/2020 RELOC[address] performed by Tara Erazo MD at [address] CATH LAB  ID RMVL TRANSVNS PM ELTRD 1 LEAD SYS ATR/VENTR N/A 4/24/2020 Remove Pacemaker Dual Leads performed by Tara Erazo MD at [facility] CATH LAB  ID RMVL TRANSVNS PM ELTRD 1 LEAD SYS ATR/VENTR N/A 4/27/2020 REMOVE PACEMAKER DUAL LEADS performed by Tara Erazo MD at [address] CATH LAB  ID TRABECULOPLASTY BY LASER SURGERY    
 US GUIDE ASP OVARIAN CYST ABD APPROACH  8.21.07  
 RIGHT Social History Socioeconomic History  Marital status:  Spouse name: Not on file  Number of children: Not on file  Years of education: Not on file  Highest education level: Not on file Tobacco Use  Smoking status: Never Smoker  Smokeless tobacco: Never Used Substance and Sexual Activity  Alcohol use: Yes Comment: rare  Drug use: No  
 
Family History Problem Relation Age of Onset  Cancer Mother  Heart Disease Mother  Alcohol abuse Father  Diabetes Brother  Hypertension Brother Allergies Allergen Reactions  Ace Inhibitors Cough  Amlodipine Swelling  Avapro [Irbesartan] Unable to Obtain  Bactrim [Sulfamethoxazole-Trimethoprim] Other (comments) Sore mouth  Captopril Cough  Carvedilol Diarrhea  Contrast Agent [Iodine] Itching [address]  Morphine Nausea and Vomiting and Swelling  Penicillins Hives Did not tolerate cefepime 3/2020  Pravachol [Pravastatin] Nausea Only  Seafood [Shellfish Containing Products] Hives  Singulair [Montelukast] Other (comments)  
  palpitations Current Outpatient Medications Medication Sig Dispense Refill  warfarin (COUMADIN) 1 mg tablet 2 tablets on Mon, Wed and Fri and 3 tablets the other 4 days  gabapentin (NEURONTIN) 100 mg capsule Take 100 mg by mouth three (3) times daily.  insulin glargine (Lantus U-100 Insulin) 100 unit/mL injection 10 Units by SubCUTAneous route nightly. 10 mL 5  
 hydrALAZINE (APRESOLINE) 100 mg tablet Take 1 Tab by mouth three (3) times daily. 90 Tab 11  
 bumetanide (BUMEX) 1 mg tablet Take 1 Tab by mouth daily. 60 Tab 3  
 fluconazole (DIFLUCAN) 150 mg tablet Take 1 po today and repeat in one week. 2 Tab 0  
 sertraline (ZOLOFT) 50 mg tablet TAKE ONE AND ONE-HALF (1 & 1/2) TABLET BY MOUTH DAILY 45 Tab 5  
 busPIRone (BUSPAR) 10 mg tablet TAKE ONE TABLET BY MOUTH TWICE A DAY 60 Tab 10  
 insulin lispro (HUMALOG) 100 unit/mL kwikpen 2 units with dinner for sugars over 200 1 Package 0  
 Cartia  mg capsule Take 180 mg by mouth daily. Review of Systems Constitutional: Negative for malaise/fatigue and weight loss. Gastrointestinal: Negative for constipation and diarrhea. Musculoskeletal: Positive for joint pain. Negative for back pain. Neurological: Positive for sensory change (foot). Negative for tingling and focal weakness. Objective:  
Vital Signs: (As obtained by patient/caregiver at home) There were no vitals taken for this visit. [INSTRUCTIONS:  \"[x]\" Indicates a positive item  \"[]\" Indicates a negative item  -- DELETE ALL ITEMS NOT EXAMINED] Constitutional: [x] Appears well-developed and well-nourished [x] No apparent distress   
  [] Abnormal - Mental status: [x] Alert and awake  [x] Oriented to person/place/time [x] Able to follow commands   
[] Abnormal - Eyes:   EOM    [x]  Normal    [] Abnormal -  
Sclera  [x]  Normal    [] Abnormal - 
        Discharge [x]  None visible   [] Abnormal - HENT: [x] Normocephalic, atraumatic  [] Abnormal -  
[x] Mouth/Throat: Mucous membranes are moist 
 
 External Ears [x] Normal  [] Abnormal - Neck: [x] No visualized mass [] Abnormal - Pulmonary/Chest: [x] Respiratory effort normal   [x] No visualized signs of difficulty breathing or respiratory distress 
      [] Abnormal - Musculoskeletal:   [] Normal gait with no signs of ataxia [x] Normal range of motion of neck [] Abnormal -  
 
Neurological:        [x] No Facial Asymmetry (Cranial nerve 7 motor function) (limited exam due to video visit) [x] No gaze palsy  
     [] Abnormal -   
     
Skin:        [x] No significant exanthematous lesions or discoloration noted on facial skin   
     [] Abnormal - Psychiatric:       [x] Normal Affect [] Abnormal -  
     [x] No Hallucinations Other pertinent observable physical exam findings:- 
 
Lab Results Component Value Date/Time Hemoglobin A1c 7.5 (H) 03/08/2020 07:09 PM  
 Hemoglobin A1c (POC) 7.2 07/10/2019 03:14 PM  
 
Lab Results Component Value Date/Time Microalb/Creat ratio (ug/mg creat.) Comment (A) 11/13/2019 04:22 PM  
 
Lab Results Component Value Date/Time Cholesterol, total 141 11/11/2019 08:47 AM  
 HDL Cholesterol 32 (L) 11/11/2019 08:47 AM  
 LDL, calculated 82 11/11/2019 08:47 AM  
 VLDL, calculated 27 11/11/2019 08:47 AM  
 Triglyceride 137 11/11/2019 08:47 AM  
 
Lab Results Component Value Date/Time Sodium 141 05/26/2020 05:30 AM  
 Potassium 3.7 05/26/2020 05:30 AM  
 Chloride 107 05/26/2020 05:30 AM  
 CO2 29 05/26/2020 05:30 AM  
 Anion gap 5 05/26/2020 05:30 AM  
 Glucose 83 05/26/2020 05:30 AM  
 BUN 23 (H) 05/26/2020 05:30 AM  
 Creatinine 1.97 (H) 05/26/2020 05:30 AM  
 BUN/Creatinine ratio 12 05/26/2020 05:30 AM  
 GFR est AA 31 (L) 05/26/2020 05:30 AM  
 GFR est non-AA 26 (L) 05/26/2020 05:30 AM  
 Calcium 8.8 05/26/2020 05:30 AM  
 Bilirubin, total 0.3 05/21/2020 04:10 AM  
 Alk.  phosphatase 88 05/21/2020 04:10 AM  
 Protein, total 5.8 (L) 05/21/2020 04:10 AM  
 Albumin 2.3 (L) 05/21/2020 04:10 AM  
 Globulin 3.5 05/21/2020 04:10 AM  
 A-G Ratio 0.7 (L) 05/21/2020 04:10 AM  
 ALT (SGPT) 56 05/21/2020 04:10 AM  
 AST (SGOT) 25 05/21/2020 04:10 AM  
 
 
 
We discussed the expected course, resolution and complications of the diagnosis(es) in detail. Medication risks, benefits, costs, interactions, and alternatives were discussed as indicated. I advised her to contact the office if her condition worsens, changes or fails to improve as anticipated. She expressed understanding with the diagnosis(es) and plan. Lavella Harada is a 60 y.o. female who was evaluated by a video visit encounter for concerns as above. Patient identification was verified prior to start of the visit. A caregiver was present when appropriate. Due to this being a TeleHealth encounter (During [facility]- public [facility] emergency), evaluation of the following organ systems was limited: Vitals/Constitutional/EENT/Resp/CV/GI//MS/Neuro/Skin/Heme-Lymph-Imm. Pursuant to the emergency declaration under the [facility], 1135 waiver authority and the [employer] Act, this Virtual  Visit was conducted, with patient's (and/or legal guardian's) consent, to reduce the patient's risk of exposure to COVID-19 and provide necessary medical care. Services were provided through a video synchronous discussion virtually to substitute for in-person clinic visit. Patient and provider were located at their individual homes.  
 
 
Rita Acosta MD

## 2020-06-26 NOTE — DISCHARGE INSTRUCTIONS
[address] [place] [address],[facility] Floor  Special Procedures/Radiology Department      RADIOLOGIST: Sindy Postin    DATE: June 26, 2020      PICC Removal Discharge Instructions      Keep the dressing clean and dry. Do not remove the dressing for 24 hours, then replace with a Band-Aid. After 3 days, remove the Band-Aid, and wash the area as you normally would. Watch for signs of infection:   Redness   Fever/Chills   Increased pain or tenderness at the site   Drainage    Continue your diet and medications    Call your physician immediately if your arm continues to swell. Any questions, please call [phone] and ask to speak to the nurse on call.

## 2020-06-29 NOTE — TELEPHONE ENCOUNTER
We increased warfarin, but INR went down. This generally indicates higher intake of Vitamin K. Finishing antibiotics has also had an effect. I think we should go to a higher dose tablet to avoid so many pills. I will send 2 mg tablets. Take 8 mg today, then 4 mg on on Mon-Wed-Fri and 2 mg the other 4 days. Repeat INR in 1 week.

## 2020-06-29 NOTE — TELEPHONE ENCOUNTER
Kenya from [facility] [phone] notified of Dr Alyssa Amaral message. New medication sent to [facility] in [place] coumadin   2 mg tablets, take 8 mg (4 tablets) today, then 4 mg (2 tablets) on Mon-Wed-Fri and 2 mg (1 Tablet) the other 4 days. Repeat INR in a week.  left for pt to check her [employer] messages.

## 2020-06-29 NOTE — TELEPHONE ENCOUNTER
Crystal from [address] called with PT/INR.     INR 1.1    PT 12.6     Pt is currently taking warfarin 1 mg tablets to 2 tablets on Mon, Wed, Fri and 3 tablets the other 4 days.

## 2020-07-06 NOTE — TELEPHONE ENCOUNTER
Kenya from [facility] [phone] notified of INR, she will notify pt and recheck in 3 weeks.

## 2020-07-06 NOTE — TELEPHONE ENCOUNTER
Kenya with [facility] called with INR results she said 2.2 and 26.5 any questions please call [phone]

## 2020-07-06 NOTE — TELEPHONE ENCOUNTER
Inform patient INR is therapeutic. Confirm current dose of warfarin 2 mg tablets 2 tablets po on Mon-Wed-Fri and 1 tablet the other 4 days. . No change in warfarin dose. Repeat in 3 weeks.

## 2020-07-27 NOTE — TELEPHONE ENCOUNTER
Message left on vm to take 6mg tonight, instructed three times to check bottle for mg's informed I would call back tomorrow to confirm.

## 2020-07-27 NOTE — TELEPHONE ENCOUNTER
We have 2 mg on the medication list. I think she is using an old bottle as she had 1 mg tablets and new prescription for 2 mg tablets was sent on June 26. I thought we confirmed dose with her on July 6. If she did not pick it up, we can send prescription for the 2 mg tablets again. Let me know if this needs to be done. Instruct her to take 6 mg today, then 2 mg daily. Repeat INR in 10 days.

## 2020-07-27 NOTE — TELEPHONE ENCOUNTER
Spoke to pt, states she is taking coumadin 1mg tablets 2 po on Mon-Wed-Fri and 1 tablet the other 4 days.

## 2020-07-27 NOTE — TELEPHONE ENCOUNTER
Baldemar Salcido from [address] [place] [address] called to report the following:    PT was 14.5, INR was 1.2    Please give a call back at [phone] with any questions.

## 2020-07-27 NOTE — TELEPHONE ENCOUNTER
INR is too low. Confirm current dose of warfarin 2 mg tablets 2 tablets po on Mon-Wed-Fri and 1 tablet the other 4 days. Change warfarin 2 mg tablets to 3 tablets today (July 27), then 1 tablet on Tues and Fri and 2 tablets the other 5 days. Remind her to be consistent about intake of high Vitamin K containing foods.   Repeat INR in 10 days

## 2020-07-28 NOTE — TELEPHONE ENCOUNTER
Spoke with pt, she has 1 mg , 2 mg and 4 mg coumadin tablets. Informed to be careful which one she takes, wants to use up the 1 mg and 4 mg, states she will double check mg before taking. She took 6 mg as instructed last night, will take 2 mg nightly and recheck INR in 10 days. Lab slip mailed to pt. No future appointments, PSR will schedule with Dr Lilia Martin in October. Pt has no further questions at this time.

## 2020-08-19 NOTE — ED NOTES
Pt has been stuck by [facility] RN/this RN. IV access unsuccessful. Will attempt US IV.

## 2020-08-19 NOTE — ED NOTES
Bedside and Verbal shift change report given to William (oncoming nurse) by Jaylene FAIRCHILD (offgoing nurse). Report included the following information SBAR, Kardex, ED Summary, Intake/Output, Recent Results and Cardiac Rhythm AF RVR.

## 2020-08-19 NOTE — ED PROVIDER NOTES
Jose Cardona is a 60 y.o. female with hx of diabetes; Hypertension; Chronic Kidney Disease; PAF on coumadin, L BKA and removal of pacemaker in July for vegetation on leads. Presenting today with right sided chest pain radiating to right shoulder. Hx of prior right rotator cuff repair. Seen at Urgent care where she was noted to be in afib w/RVR and sent to ED. Denies SOB/N/V. Pain while at rest, worse with movement, unrelated to exertion. C/o mass at right clavicle where the pain originates and reports she first noticed it 3-4 days ago. Denies inciting trauma. Denies any trouble swallowing or breathing. Past Medical History:  
Diagnosis Date  Acquired lymphedema Right arm  Arrhythmia  Asthma  Atrial fibrillation ([address] Dr. Miguelito Pizano and Dr[facility]  Cancer ([address] bilateral breast  
 Chronic kidney disease  Diabetes mellitus type II   
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  
Procedure Laterality Date  ABDOMEN SURGERY PROC UNLISTED    
 gastric bypass  GASTRIC BYPASS,OBESE<150CM SAW-EN-Y  7/08  
 [facility]  HX AFIB ABLATION    
 HX BREAST RECONSTRUCTION Bilateral   
 HX CARPAL TUNNEL RELEASE [id number] RIGHT MODIFIED RADICAL   
 HX MASTECTOMY Left   
 no lymphnode removal  
 HX MOHS PROCEDURES  1995  
 right  HX ORTHOPAEDIC Right knee surgery  HX PACEMAKER    
 HX PACEMAKER    
 IR INSERT TUNL CVC W PORT OVER 5 YEARS    
 IR INSERT TUNL CVC W/O PORT OVER 5 YR  5/4/2020  IR REMOVE TUNL CVAD W/O PORT / PUMP  6/26/2020  [address]  8.21.07  
 DC [address] 9[address] AND 1994  DC RELOCATION OF SKIN POCKET FOR PACEMAKER N/A 4/24/2020 RELOCATE [address] performed [place] [address] CATH LAB  DC RMVL TRANSVNS PM ELTRD 1 LEAD SYS ATR/VENTR N/A 4/24/2020 Remove Pacemaker Dual Leads performed by Reagan Louis MD at [facility]ATH [facility]  DC RMVL TRANSVNS PM ELTRD 1 LEAD SYS ATR/VENTR N/A 4/27/2020 REMOVE PACEMAKER DUAL LEADS performed by Reagan Louis MD at [address] CATH LAB  DC TRABECULOPLASTY BY LASER SURGERY    
 US GUIDE ASP OVARIAN CYST ABD APPROACH  8.21.07  
 RIGHT Family History:  
Problem Relation Age of Onset  Cancer Mother  Heart Disease Mother  Alcohol abuse Father  Diabetes Brother  Hypertension Brother Social History Socioeconomic History  Marital status:  Spouse name: Not on file  Number of children: Not on file  Years of education: Not on file  Highest education level: Not on file Occupational History  Not on file Social Needs  Financial resource strain: Not on file  Food insecurity Worry: Not on file Inability: Not on file  Transportation needs Medical: Not on file Non-medical: Not on file Tobacco Use  Smoking status: Never Smoker  Smokeless tobacco: Never Used Substance and Sexual Activity  Alcohol use: Yes Comment: rare  Drug use: No  
 Sexual activity: Not on file Lifestyle  Physical activity Days per week: Not on file Minutes per session: Not on file  Stress: Not on file Relationships  Social connections Talks on phone: Not on file Gets together: Not on file Attends Jew service: Not on file Active member of club or organization: Not on file Attends meetings of clubs or organizations: Not on file Relationship status: Not on file  Intimate partner violence Fear of current or ex partner: Not on file Emotionally abused: Not on file Physically abused: Not on file Forced sexual activity: Not on file Other Topics Concern  Not on file Social History Narrative  Not on file ALLERGIES: Ace inhibitors; Amlodipine; Ceasar Meals; Bactrim [sulfamethoxazole-trimethoprim]; Captopril; Carvedilol; Contrast agent [iodine]; Fish containing products; Morphine; Penicillins; Pravachol [pravastatin]; Seafood [shellfish containing products]; and Singulair [montelukast] Review of Systems Constitutional: Negative for chills and fever. HENT: Negative for drooling and nosebleeds. Eyes: Negative for pain and itching. Respiratory: Negative for choking and stridor. Cardiovascular: Positive for chest pain. Negative for palpitations and leg swelling. Gastrointestinal: Negative for abdominal distention and rectal pain. Endocrine: Negative for heat intolerance and polyphagia. Genitourinary: Negative for enuresis and genital sores. Musculoskeletal: Negative for arthralgias and joint swelling. Skin: Negative for color change. Allergic/Immunologic: Negative for immunocompromised state. Neurological: Negative for tremors and speech difficulty. Hematological: Negative for adenopathy. Psychiatric/Behavioral: Negative for dysphoric mood and sleep disturbance. Vitals:  
 08/19/20 1729 BP: (!) 199/110 Pulse: (!) 133 Resp: 20 Temp: 98.3 °F (36.8 °C) SpO2: 99% Weight: 109.8 kg (242 lb) Height: 5' 10\" (1.778 m) Physical Exam 
Vitals signs and nursing note reviewed. Constitutional:   
   General: She is not in acute distress. Appearance: She is well-developed. She is not ill-appearing, toxic-appearing or diaphoretic. HENT:  
   Head: Normocephalic. Nose: Nose normal.  
   Mouth/Throat:  
   Mouth: Mucous membranes are moist.  
Eyes:  
   Conjunctiva/sclera: Conjunctivae normal.  
Neck: Musculoskeletal: Normal range of motion and neck supple. No neck rigidity or muscular tenderness. Cardiovascular:  
   Rate and Rhythm: Regular rhythm. Heart sounds: Normal heart sounds. Pulmonary:  
   Effort: Pulmonary effort is normal. No respiratory distress. Comments: Swelling overlying right clavicle. No overlying cellulitis. Chest:  
   Chest wall: Tenderness present. Abdominal:  
   General: There is no distension. Palpations: Abdomen is soft. Musculoskeletal: Normal range of motion. General: No deformity. Skin: 
   General: Skin is warm and dry. Neurological:  
   Mental Status: She is alert. Coordination: Coordination normal.  
Psychiatric:     
   Behavior: Behavior normal.  
 
  
 
MDM Number of Diagnoses or Management Options Atrial fibrillation with RVR ([address]):  
Septic arthritis, due to unspecified organism, septic arthritis of unspecified location [facility]):  
Diagnosis management comments: Septic arthritis of sternoclavicular joint noted. Conversation with orthopedics who recommends consult to thoracic surgery. Spoke with Dr. Trinh Goode who would like patient to be n.p.o. and transferred to [facility]. Recommends Vanco and Zosyn for broad-spectrum antibiotics and will take to the OR tomorrow. Discussed with patient who is amenable to plan at this time. Spoke with Dr. Reginald Vasquez for transfer. Let them know that patient is allergic to penicillin, and he recommended switching Zosyn to meropenem. Patient remains HD stable, no concern for sepsis at this time. We will continue to monitor. Amount and/or Complexity of Data Reviewed Decide to obtain previous medical records or to obtain history from someone other than the patient: yes ED Course as of Aug 19 1813 Wed Aug 19, 2020  
1739 ED EKG interpretation: 
Rhythm: atrial fib; and irregular. Rate (approx.): 142; Axis: normal; ST/T wave: non-specific changes; No STEMI  
 [AL] ED Course User Index [AL] Jaja Lowe MD  
 
 
Procedures Critical care time: 65 mins Patient is being admitted to the hospital.  The results of their tests and reasons for their admission have been discussed with them and/or available family. They convey agreement and understanding for the need to be admitted and for their admission diagnosis.

## 2020-08-19 NOTE — ED NOTES
Ultrasound IV by ED Staff Procedure Note Patient meets criteria for US IV insertion. Ultrasound IV verbal education provided to patient. Opportunities for questions given. IV ultrasound technique used for PIV placement: 
20 gauge L basilic location. 1 X Attempt(s). Procedure tolerated well. Primary RN aware of IV placement and added to LDA.   
 
                           Marilyn Aragon RN

## 2020-08-20 PROBLEM — A41.9 SEPSIS (HCC): Status: ACTIVE | Noted: 2020-01-01

## 2020-08-20 NOTE — PERIOP NOTES
Nurse accompanied patient to [facility] for transfer to room.

## 2020-08-20 NOTE — PROGRESS NOTES
TRANSFER - IN REPORT: 
 
Verbal report received from [facility]) on Palacio Dark  being received from [address] [place] [address]) for urgent transfer Report consisted of patients Situation, Background, Assessment and  
Recommendations(SBAR). Information from the following report(s) SBAR, Kardex, ED Summary, Intake/Output, MAR, Accordion, Recent Results, Med Rec Status, and Cardiac Rhythm A fib  was reviewed with the receiving nurse. Opportunity for questions and clarification was provided. 0722-Pt arrived via stretcher with transport, vitals done, pt resting comfortably in bed 
 
 
0740-Gia NARAYANAN at the bedside Problem: Risk for Spread of Infection Goal: Prevent transmission of infectious organism to others Outcome: Progressing Towards Goal 
Pt has ESBL, on contact precautions, precautions followed 0800-Bedside shift change report given to [address] [place] [address] (oncoming nurse) by Vesta Bello RN (offgoing nurse). Report included the following information SBAR, Kardex, ED Summary, Intake/Output, MAR, Accordion, Recent Results, Med Rec Status, and Cardiac Rhythm A fib .

## 2020-08-20 NOTE — ANESTHESIA POSTPROCEDURE EVALUATION
Procedure(s): 
INCISION AND DEBRIDEMENT RIGHT STERNOCLAVICULAR JOINT RESECTION OF MIDDLE THIRD OF RIGHT CLAVICLE AND PLACEMENT OF WOUND VAC. general 
 
Anesthesia Post Evaluation Patient location during evaluation: PACU Note status: Adequate. Level of consciousness: responsive to verbal stimuli and sleepy but conscious Pain management: satisfactory to patient Airway patency: patent Anesthetic complications: no 
Cardiovascular status: acceptable Respiratory status: acceptable Hydration status: acceptable Comments: +Post-Anesthesia Evaluation and Assessment Patient: Ramone John MRN: [id number]  SSN: [id number] YOB: 1960  Age: 60 y.o. Sex: female Cardiovascular Function/Vital Signs /90 (BP 1 Location: Left arm, BP Patient Position: At rest;Supine)   Pulse 97   Temp 36.5 °C (97.7 °F)   Resp 20   Wt 105 kg (231 lb 6.4 oz)   SpO2 100%   BMI 33.20 kg/m² Patient is status post Procedure(s): 
INCISION AND DEBRIDEMENT RIGHT STERNOCLAVICULAR JOINT RESECTION OF MIDDLE THIRD OF RIGHT CLAVICLE AND PLACEMENT OF WOUND VAC. Nausea/Vomiting: Controlled. Postoperative hydration reviewed and adequate. Pain: 
Pain Scale 1: Numeric (0 - 10) (08/20/20 1230) Pain Intensity 1: 0 (08/20/20 1230) Managed. Neurological Status:  
Neuro (WDL): Within Defined Limits (08/20/20 1230) At baseline. Mental Status and Level of Consciousness: Arousable. Pulmonary Status:  
O2 Device: Nasal cannula (08/20/20 1230) Adequate oxygenation and airway patent. Complications related to anesthesia: None Post-anesthesia assessment completed. No concerns. I have evaluated the patient and the patient is stable and ready to be discharged from PACU . Signed By: Alysha Velazco MD  
 8/20/2020 INITIAL Post-op Vital signs:  
Vitals Value Taken Time /89 8/20/2020 12:45 PM  
Temp 36.5 °C (97.7 °F) 8/20/2020 11:47 AM  
Pulse 101 8/20/2020 12:49 PM  
 Resp 17 8/20/2020 12:49 PM  
SpO2 100 % 8/20/2020 12:49 PM  
Vitals shown include unvalidated device data.

## 2020-08-20 NOTE — PROGRESS NOTES
Admission Medication Reconciliation: 
 
Information obtained from:  Patient (phone) RxQuery data available¹:  YES Comments 1)  Spoke with patient over the phone to update PTA med list. 2)  Medication changes (since last review): Added 
- none Adjusted - Bumex (was 1mg , now 2mg daily) -Zoloft dose is 75mg daily. (notified the ordering provider about this change) Removed 
- gabapentin (notified the ordering provider about this change) ¹RxQuery pharmacy benefit data reflects medications filled and processed through the patient's insurance, however  
this data does NOT capture whether the medication was picked up or is currently being taken by the patient. Allergies:  Ace inhibitors; Amlodipine; Deannie Rile; Bactrim [sulfamethoxazole-trimethoprim]; Captopril; Carvedilol; Contrast agent [iodine]; Fish containing products; Morphine; Penicillins; Pravachol [pravastatin]; Seafood [shellfish containing products]; and Singulair [montelukast] Significant PMH/Disease States:  
Past Medical History:  
Diagnosis Date Acquired lymphedema Right arm Arrhythmia Asthma Atrial fibrillation ([facility] Dr. Annemarie Napoles and Dr. Mckenzie Belt  
 Atrial flutter [facility]) Cancer ([facility] bilateral breast  
 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 Prior to Admission Medications: Prior to Admission Medications Prescriptions Last Dose Informant Patient Reported? Taking? Cartia  mg capsule 2020 at Unknown time  Yes Yes Sig: Take 180 mg by mouth daily. bumetanide (BUMEX) 1 mg tablet   Yes Yes Sig: Take 2 mg by mouth daily. busPIRone (BUSPAR) 10 mg tablet 2020 at Unknown time Self No Yes Sig: TAKE ONE TABLET BY MOUTH TWICE A DAY  
hydrALAZINE (APRESOLINE) 100 mg tablet 2020 at Unknown time  No Yes Sig: Take 1 Tab by mouth three (3) times daily. insulin glargine (Lantus Solostar U-100 Insulin) 100 unit/mL (3 mL) inpn   Yes Yes Sig: 10 Units by SubCUTAneous route Daily (before breakfast). insulin lispro (HUMALOG) 100 unit/mL kwikpen 2020 at Unknown time Self Yes Yes Si Units by SubCUTAneous route after meals as needed. 2 units with dinner for sugars over 200  
sertraline (Zoloft) 50 mg tablet   Yes Yes Sig: Take 75 mg by mouth daily. warfarin (COUMADIN) 2 mg tablet   No Yes Sig: Take 1 tablet po daily Facility-Administered Medications: None Please contact the main inpatient pharmacy with any questions or concerns at [phone] and we will direct you to the clinical pharmacist covering this patient's care while in-house.   
Mariella Allen, PHARMD

## 2020-08-20 NOTE — ANESTHESIA PREPROCEDURE EVALUATION
Relevant Problems No relevant active problems Anesthetic History No history of anesthetic complications Review of Systems / Medical History Patient summary reviewed, nursing notes reviewed and pertinent labs reviewed Pulmonary Asthma : well controlled Neuro/Psych Within defined limits Cardiovascular Within defined limits Hypertension Dysrhythmias : atrial fibrillation Exercise tolerance: <4 METS Comments: S/p in pacer excision sec inf SSS  
GI/Hepatic/Renal 
  
 
 
Renal disease: CRI Endo/Other Diabetes: type 2 Obesity and arthritis Other Findings Physical Exam 
 
Airway Mallampati: II 
TM Distance: > 6 cm Neck ROM: normal range of motion Mouth opening: Normal 
 
 Cardiovascular Rhythm: irregular Rate: abnormal 
 
Murmur: Grade 2, Mitral area Dental 
 
Dentition: Poor dentition and Upper partial plate Pulmonary Breath sounds clear to auscultation Abdominal 
GI exam deferred Other Findings Anesthetic Plan ASA: 3 Anesthesia type: general 
 
 
 
 
Induction: Intravenous Anesthetic plan and risks discussed with: Patient

## 2020-08-20 NOTE — PROGRESS NOTES
Paged Dr. Stevenson Sever office as an [place] to let him know pt has arrived to unit. Callback unnecessary.

## 2020-08-20 NOTE — H&P
[facility] Adult  Hospitalist Group History and Physical 
 
Primary Care Provider: Emil Perez MD 
Date of Service:  8/20/2020 CC: chest pain Subjective:  
 
 
60year old female with past medical history HTN, DM Type II on insulin, Atrial fibrillation/flutter, CKD stage III, recent admission 4/19/2020-5/13/2020 for left foot cellulitis/OM s/p left BKA, bacteremia MSSA, endocarditis, Pacemaker vegetation s/p lead extraction, presenting to [facility] as a direct transfer from [facility] emergency department for thoracic surgery evaluation. Patient developed right-sided chest pain/tenderness radiating to right shoulder. Seen at urgent care and found to have atrial fibrillation with RVR. Sent to the emergency department for further evaluation. In the ED, CT chest demonstrated septic arthritis of the right sternal clavicular joint and no abscess. Given Merrem and vancomycin and transferred to [facility]. 
 
 
Review of Systems: A comprehensive review of systems was negative except for that written in the History of Present Illness. Past Medical History:  
Diagnosis Date  Acquired lymphedema Right arm  Arrhythmia  Asthma  Atrial fibrillation ([facility] Del [facility]  Cancer ([address] bilateral breast  
 Chronic kidney disease  Diabetes mellitus type II   
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  
Procedure Laterality Date  ABDOMEN SURGERY PROC UNLISTED    
 gastric bypass  GASTRIC BYPASS,OBESE<150CM SAW-EN-Y  7/08  
 [facility]  HX AFIB ABLATION    
 HX BREAST RECONSTRUCTION Bilateral   
 HX CARPAL TUNNEL RELEASE [address] [facility] [address] [facility] [address] [place] [address] RIGHT MODIFIED RADICAL   
 HX MASTECTOMY Left   
 no lymphnode removal  
 HX MOHS PROCEDURES  1995  
 right  HX ORTHOPAEDIC Right   
 knee surgery  HX PACEMAKER    
 HX PACEMAKER    
 IR INSERT TUNL CVC W PORT OVER 5 YEARS    
 IR INSERT TUNL CVC W/O PORT OVER 5 YR  5/4/2020  IR REMOVE TUNL CVAD W/O PORT / PUMP  6/26/2020  [address] [facility] [address]  8.21.07  
 NC [address] AND 1994  NC RELOCATION OF SKIN POCKET FOR PACEMAKER N/A 4/24/2020 RELOCATE 2 [facility] performed by Luz Marina Cerna MD at [address] CATH LAB  NC RMVL TRANSVNS PM ELTRD 1 LEAD SYS ATR/VENTR N/A 4/24/2020 Remove Pacemaker Dual Leads performed by Luz Marina Cerna MD at [facility] CATH LAB  NC RMVL TRANSVNS PM ELTRD 1 LEAD SYS ATR/VENTR N/A 4/27/2020 REMOVE PACEMAKER DUAL LEADS performed by Luz Marina C[place] MD at [address] CATH LAB  NC TRABECULOPLASTY BY LASER SURGERY    
 US GUIDE ASP OVARIAN CYST ABD APPROACH  8.21.07  
 RIGHT Prior to Admission medications Medication Sig Start Date End Date Taking? Authorizing Provider  
bumetanide (BUMEX) 1 mg tablet Take 2 mg by mouth daily. Yes Provider, Historical  
insulin glargine (Lantus Solostar U-100 Insulin) 100 unit/mL (3 mL) inpn 10 Units by SubCUTAneous route Daily (before breakfast). Yes Provider, Historical  
sertraline (Zoloft) 50 mg tablet Take 75 mg by mouth daily.    Yes Provider, Historical  
warfarin (COUMADIN) 2 mg tablet Take 1 tablet po daily 7/27/20  Yes Caroline Terry MD  
 Cartia  mg capsule Take 180 mg by mouth daily. 6/12/20  Yes Provider, Historical  
hydrALAZINE (APRESOLINE) 100 mg tablet Take 1 Tab by mouth three (3) times daily. 6/12/20  Yes Tamera Stewart MD  
busPIRone (BUSPAR) 10 mg tablet TAKE ONE TABLET BY MOUTH TWICE A DAY 5/24/19  Yes Tamera Stewart MD  
insulin lispro (HUMALOG) 100 unit/mL kwikpen 2 Units by SubCUTAneous route after meals as needed. 2 units with dinner for sugars over 200 1/3/14  Yes Tamera Stewart MD  
 
Allergies Allergen Reactions  Ace Inhibitors Cough  Amlodipine Swelling  Avapro [Irbesartan] Unable to Obtain  Bactrim [Sulfamethoxazole-Trimethoprim] Other (comments) Sore mouth  Captopril Cough  Carvedilol Diarrhea  Contrast Agent [Iodine] Itching [address]  Morphine Nausea and Vomiting and Swelling  Penicillins Hives Did not tolerate cefepime 3/2020  Pravachol [Pravastatin] Nausea Only  Seafood [Shellfish Containing Products] Hives  Singulair [Montelukast] Other (comments)  
  palpitations Family History Problem Relation Age of Onset  Cancer Mother  Heart Disease Mother  Alcohol abuse Father  Diabetes Brother  Hypertension Brother SOCIAL HISTORY: 
Patient resides at Home. Patient ambulates with independence. Smoking history: Denies Alcohol history: Denies Objective:  
 
 
Physical Exam:  
Visit Vitals /73 (BP 1 Location: Left arm, BP Patient Position: At rest) Pulse 98 Temp 97.1 °F (36.2 °C) Resp 18 Wt 105 kg (231 lb 6.4 oz) SpO2 98% BMI 33.20 kg/m² General appearance: alert, cooperative, mild distress, appears stated age Head: Normocephalic, without obvious abnormality, atraumatic Lungs: clear to auscultation bilaterally Heart: irregularly irregular, tachycardic, no murmur, click, rub or gallop Chest: tender to palpation right chest pain Abdomen: soft, non-tender. Bowel sounds normal. No masses,  no organomegaly Extremities: extremities normal, atraumatic, no cyanosis or edema Pulses: 2+ and symmetric Skin: Skin color, texture, turgor normal. No rashes or lesions Neurologic: Grossly normal 
Cap refill: Brisk, less than 3 seconds Pulses: 2+, symmetric in all extremities ECG: Atrial fibrillation with RVR Data Review: All diagnostic labs and studies have been reviewed. CT chest 8/20/2020:  
IMPRESSION: 
Suspect septic arthritis of the right sternoclavicular joint. No drainable fluid 
collection. Assessment:  
 
Active Problems: 
  Sepsis ([address] (8/20/2020) Plan:  
 
Sepsis (tachycardia, leukocytosis) secondary to suspected infected sternoclavicular joint: 
-Transferred to [facility] for thoracic surgery consultation 
-Recent complicated hospitalization with MSSA bacteremia/endocarditis 
-Plans for OR debridement 8/20 
-Broad-spectrum antibiotics: Meropenem, vancomycin (penicillin allergy) 
-Consult infectious disease 
-Follow blood cultures Atrial fibrillation with RVR: 
-Cardizem drip, initiate oral diltiazem 
-Initiate home Coumadin when okay by surgery Diabetes mellitus, type II: 
-Hold long-acting, continue SSI Depression: Home buspirone, Zoloft DVT: Lovenox: 
Code: Full Signed By: [address] [facility] [address], DO August 20, 2020

## 2020-08-20 NOTE — PROGRESS NOTES
TEE: 
Patient is post op right sternoclavicular jiont along with resection of r clavicle. Patient is lethargic at present and crm will follow up in am and complete assessment. I also will follow up with wound care as patient may need a wound vac at home.

## 2020-08-20 NOTE — CONSULTS
Asked to see Mrs Rajan Garcia re: right sternoclavicular joint infection Referred by [facility] ER 
 
Mrs Rajan Garcia is a pleasant 61 y/o lady with a complex past medical history significant for obesity, pAF, CKD and DMII. In July she had her pacemaker explanted for infection/vegetation on her leads. She completed a long course of Abx. She presented to the ER yesterday with a 3-4 day history of painful swelling around her right collarbone that is getting worse. She was also in Afib with RVR. No recent trauma to that area. Allergies Allergen Reactions  Ace Inhibitors Cough  Amlodipine Swelling  Avapro [Irbesartan] Unable to Obtain  Bactrim [Sulfamethoxazole-Trimethoprim] Other (comments) Sore mouth  Captopril Cough  Carvedilol Diarrhea  Contrast Agent [Iodine] Itching [address]  Morphine Nausea and Vomiting and Swelling  Penicillins Hives Did not tolerate cefepime 3/2020  Pravachol [Pravastatin] Nausea Only  Seafood [Shellfish Containing Products] Hives  Singulair [Montelukast] Other (comments)  
  palpitations PMHx: morbid obesity, DMII, CKD, HTN, pAF, SSS 
 
PSHx: L BKA, right shoulder, ACF SocHx: not an active smoker Family History Problem Relation Age of Onset  Cancer Mother  Heart Disease Mother  Alcohol abuse Father  Diabetes Brother  Hypertension Brother No current facility-administered medications for this encounter. ROS: 
 
Constitutional- feels fatigued HEENT- mild dysphagia Neuro- denies syncope Optho- no recent changes in vision Resp- denies hemoptysis CV- denies angina or palpitations GI- denies abd pain - no complaints ID- denies recent fevers Vasc- denies claudication MSK- increasing r shoulder pain Blood pressure (!) 142/106, pulse 92, temperature 98 °F (36.7 °C), resp. rate 13, weight 231 lb 6.4 oz (105 kg), SpO2 100 %. On exam she is laying in bed Alert and oriented Obese Not tachypneic Not diaphoretic Normal speech, normal affect No goiter Well healed ACF incision Lungs distant breath sounds b/l There is a tender, erythematous, firm but not fluctuant swelling overlying the right SC joint, no skin trauma 
irreg rate, rhythm, no murmurs Radial pulses palp b/l 
abd sntnd, no organomegaly LLL prosthesis in place 
 
============================== Recent Results (from the past 24 hour(s)) EKG, 12 LEAD, INITIAL Collection Time: 08/19/20  5:30 PM  
Result Value Ref Range Ventricular Rate 142 BPM  
 Atrial Rate 82 BPM  
 QRS Duration 112 ms  
 Q-T Interval 340 ms QTC Calculation (Bezet) 523 ms Calculated R Axis -31 degrees Calculated T Axis 113 degrees Diagnosis Atrial fibrillation with rapid ventricular response Left axis deviation Incomplete left bundle branch block ST & T wave abnormality, consider lateral ischemia Abnormal ECG When compared with ECG of 03-MAY-2020 15:30, Atrial fibrillation has replaced Sinus rhythm Vent. rate has increased BY  95 BPM 
T wave inversion now evident in Lateral leads CBC WITH AUTOMATED DIFF Collection Time: 08/19/20  5:57 PM  
Result Value Ref Range WBC 15.6 (H) 3.6 - 11.0 K/uL  
 RBC 4.10 3.80 - 5.20 M/uL  
 HGB 11.9 11.5 - 16.0 g/dL HCT 39.1 35.0 - 47.0 % MCV 95.4 80.0 - 99.0 FL  
 MCH 29.0 26.0 - 34.0 PG  
 MCHC 30.4 30.0 - 36.5 g/dL  
 RDW 16.1 (H) 11.5 - 14.5 % PLATELET 305 224 - 618 K/uL MPV 10.9 8.9 - 12.9 FL  
 NRBC 0.0 0  WBC ABSOLUTE NRBC 0.00 0.00 - 0.01 K/uL NEUTROPHILS 84 (H) 32 - 75 % LYMPHOCYTES 7 (L) 12 - 49 % MONOCYTES 8 5 - 13 % EOSINOPHILS 0 0 - 7 % BASOPHILS 0 0 - 1 % IMMATURE GRANULOCYTES 1 (H) 0.0 - 0.5 % ABS. NEUTROPHILS 13.2 (H) 1.8 - 8.0 K/UL  
 ABS. LYMPHOCYTES 1.0 0.8 - 3.5 K/UL  
 ABS. MONOCYTES 1.2 (H) 0.0 - 1.0 K/UL  
 ABS. EOSINOPHILS 0.0 0.0 - 0.4 K/UL  
 ABS. BASOPHILS 0.1 0.0 - 0.1 K/UL ABS. IMM. GRANS. 0.1 (H) 0.00 - 0.04 K/UL  
 DF AUTOMATED METABOLIC PANEL, COMPREHENSIVE Collection Time: 08/19/20  5:57 PM  
Result Value Ref Range Sodium 135 (L) 136 - 145 mmol/L Potassium 4.2 3.5 - 5.1 mmol/L Chloride 106 97 - 108 mmol/L  
 CO2 20 (L) 21 - 32 mmol/L Anion gap 9 5 - 15 mmol/L Glucose 205 (H) 65 - 100 mg/dL BUN 24 (H) 6 - 20 MG/DL Creatinine 2.16 (H) 0.55 - 1.02 MG/DL  
 BUN/Creatinine ratio 11 (L) 12 - 20 GFR est AA 28 (L) >60 ml/min/1.73m2 GFR est non-AA 23 (L) >60 ml/min/1.73m2 Calcium 8.5 8.5 - 10.1 MG/DL Bilirubin, total 0.7 0.2 - 1.0 MG/DL  
 ALT (SGPT) 12 12 - 78 U/L  
 AST (SGOT) 20 15 - 37 U/L Alk. phosphatase 127 (H) 45 - 117 U/L Protein, total 8.2 6.4 - 8.2 g/dL Albumin 2.8 (L) 3.5 - 5.0 g/dL Globulin 5.4 (H) 2.0 - 4.0 g/dL A-G Ratio 0.5 (L) 1.1 - 2.2    
TROPONIN I Collection Time: 08/19/20  5:57 PM  
Result Value Ref Range Troponin-I, Qt. <0.05 <0.05 ng/mL C REACTIVE PROTEIN, QT Collection Time: 08/19/20  5:57 PM  
Result Value Ref Range C-Reactive protein 16.10 (H) 0.00 - 0.60 mg/dL SED RATE (ESR) Collection Time: 08/19/20  5:57 PM  
Result Value Ref Range Sed rate, automated 57 (H) 0 - 30 mm/hr SAMPLES BEING HELD Collection Time: 08/19/20  6:47 PM  
Result Value Ref Range SAMPLES BEING HELD 1RED,1SST   
 COMMENT Add-on orders for these samples will be processed based on acceptable specimen integrity and analyte stability, which may vary by analyte. PROTHROMBIN TIME + INR Collection Time: 08/19/20  6:47 PM  
Result Value Ref Range INR 1.1 0.9 - 1.1 Prothrombin time 11.4 (H) 9.0 - 11.1 sec SARS-COV-2 Collection Time: 08/19/20 10:27 PM  
Result Value Ref Range Specimen source Nasopharyngeal    
 Specimen source Nasopharyngeal    
 COVID-19 rapid test Not detected NOTD Specimen type NP Swab  Health status Symptomatic Testing    
 
 
============================== 
 I have personally reviewed her chest CT. She had a noticebale swelling around her right SC joint, no definitive abscess 
 
============================== 
 
PFTs- none 
 
============================== Diagnoses  1: infected right sternoclavicular joint  2: h/o recent bacteremia  3: morbid obesity  4: pAF 
 
============================= 
 
Mrs. Dixon Flor has an infected right sternoclavicular joint. She has a leukocytosis. Last month she finished a course of Abx for bacteremia. Will post her for operative debridement of the SC joint. She need IV Abx. I would recommend an ID consult given her recent bacteremia and this new septic joint. Send blood cultures. Thank you for allowing us to care for Mrs Kathie De Dios

## 2020-08-20 NOTE — BRIEF OP NOTE
Brief Postoperative Note Patient: Ravindra Blanco YOB: 1960 MRN: [id number] Date of Procedure: 8/20/2020 Pre-Op Diagnosis: septic right sternoclavicular joint Post-Op Diagnosis: Same as preoperative diagnosis. Procedure(s): 
INCISION AND DEBRIDEMENT RIGHT STERNOCLAVICULAR JOINT RESECTION OF MIDDLE THIRD OF RIGHT CLAVICLE AND PLACEMENT OF WOUND VAC Surgeon(s): 
rPiya Mustafa MD 
 
Surgical Assistant: None Anesthesia: General  
 
Estimated Blood Loss (mL): less than 100 Complications: None Specimens:  
ID Type Source Tests Collected by Time Destination 1 : right sternoclavicular joint Other Joint, Other  Priya Mustafa MD 8/20/2020 1046 Pathology 2 : right clavicular head Fresh OTHER (use comments)  Priya Mustafa MD 8/20/2020 1109 Pathology 1 : right sternoclavicular joint abscess Tissue Abscess CULTURE, ANAEROBIC, CULTURE, TISSUE W Michael Romero STAIN, Mason Marr MD 8/20/2020 1039 Microbiology 2 : right sternoclavicular joint abscess Tissue Abscess CULTURE, ANAEROBIC, CULTURE, TISSUE W Michael Romero STAIN, Mason Marr MD 8/20/2020 1046 Microbiology Implants: * No implants in log * Drains: * No LDAs found * Findings: jacinta purulence within the [facility] joint which was eroded away Condition: stable Disposition: to pacu Electronically Signed by Tangela Valdez MD on 8/20/2020 at 11:30 AM

## 2020-08-20 NOTE — PERIOP NOTES
TRANSFER - OUT REPORT: 
 
Verbal report given to [employer] on Alma Law  being transferred to [facility] for routine post - op Report consisted of patients Situation, Background, Assessment and  
Recommendations(SBAR). Time Pre op antibiotic given: On scheduled antibiotics Anesthesia Stop time: 3045 [place] Present on Transfer to floor:no Order for [place] on Chart:no Discharge Prescriptions with Chart:no Information from the following report(s) SBAR was reviewed with the receiving nurse. Opportunity for questions and clarification was provided. Is the patient on 02? YES 
     L/Min 2 Other Is the patient on a monitor? NO Is the nurse transporting with the patient? NO Surgical Waiting Area notified of patient's transfer from PACU? NO The following personal items collected during your admission accompanied patient upon transfer:  
Dental Appliance: Dental Appliances: None Vision: Visual Aid: Glasses, With patient Hearing Aid:   
Jewelry: Jewelry: None Clothing: Clothing: At bedside Other Valuables: Other Valuables: Cell Phone, [place] Valuables sent to safe:

## 2020-08-20 NOTE — OP NOTES
[address] 
OPERATIVE REPORT Name:  Clifton Martin 
MR#:  [id number] :  1960 ACCOUNT #:  [de-identified] DATE OF SERVICE:  2020 CLINICAL SERVICE:  Thoracic Surgery. ATTENDING SURGEON:  Joselito Cancino MD 
 
OPERATIONS PERFORMED: 
1. Incision and debridement of right sternoclavicular joint. 2.  Resection of medial third of right clavicle. 3.  Placement of wound VAC. PREOPERATIVE DIAGNOSIS:  Septic right sternoclavicular joint. POSTOPERATIVE DIAGNOSIS:  Septic right sternoclavicular joint. FIRST ASSISTANT:  Miah Hsu 
 
SPECIMENS SENT: 
1. Right sternoclavicular joint purulence was sent to microbiology. 2.  Right sternoclavicular joint tissue sent to pathology. 3.  Right clavicular head sent to pathology. DRAINS AND TUBES:  A wound VAC was placed over the right clavicular wound. ANESTHESIA:  General with endotracheal intubation. ESTIMATED BLOOD LOSS:  For this case was 100 mL. COMPLICATIONS:  None. IMPLANTS:  None. INDICATIONS FOR PROCEDURE:  The patient is a 60-year-old morbidly obese lady who approximately 1 month ago had bacteremia with vegetations on a pacemaker lead requiring extraction. She has completed a course of antibiotics but presented to the emergency department with a painful, tender swelling over her right sternoclavicular joint. She was transferred to [facility] for Thoracic Surgery evaluation and after physical exam, the decision was made to proceed to the operating room for debridement of this joint. PROCEDURE IN DETAIL:  After informed consent was obtained and placed on the chart, the patient was taken to the operating room and placed supine on the operating table. General anesthesia with endotracheal intubation was induced without complication. Preoperative antibiotics were administered. The patient's right chest was prepped and draped in sterile fashion. Time-out was performed. An approximately 8-cm incision was made along the medial third of the clavicle with a hockey stick portion carried down over the manubrium. Dissection was carried down through the muscular layers. The muscles were pale in color and quite edematous. Once we exposed the right sternoclavicular joint, it was very edematous, swollen. The joint appeared moth-eaten and eroded and there was a gray to purple colored purulence seeping out from the joint. Multiple swabs and specimens were taken for culture for microbiology. The clavicular head did not appear viable and as previously stated, the joint was completely eroded away. We then dissected out the medial third of the right clavicle and using a Gigli saw, transected the clavicle. The medial clavicle and clavicular head were then excised and sent to anatomic pathology for identification. The sternoclavicular joint capsule was then entirely debrided and excised. All surrounding necrotic-appearing tissue and muscle were also debrided. The wound was thoroughly irrigated with warm saline. The wound was then thoroughly irrigated with diluted Betadine. Hemostasis was ensured. Due to the jacinta purulence encountered in the wound and necrotic tissue, decision was made to place a wound VAC so that a second look before delayed closure could be performed. Two wound VAC sponges were then cut to appropriate size and placed within the wound bed. The [facility] system was then applied and had a good seal and negative pressure therapy. The patient was then reversed from general anesthesia, extubated and taken to PACU in stable condition. All surgical counts were correct x2 at the end of this case. There were no immediate complications identified during this case. Dr. Everton Tovar was present and scrubbed throughout the entire procedure. Ty Bennett MD 
 
 
[id number] 
D:  08/20/2020 11:59 
T:  08/20/2020 12:31 
JOB #:  [id number]

## 2020-08-20 NOTE — PROGRESS NOTES
Pharmacist Note - Vancomycin Dosing Consult provided for this 60 y.o. female for indication of septic sternoclavicular joint, s/p I&D of joint, clavicle resection and wound vac placement 20. Recent removal of pacemaker due to vegetation on leads. Antibiotic regimen(s): Meropenem (PCN allergy) plus vancomycin Patient on vancomycin PTA? Yes - x 1 at [facility] Recent Labs  
  20 
1017 20 
1757 WBC  --  15.6* CREA 1.93* 2.16* BUN 26* 24* Frequency of BMP: daily Height: 177.8 cm Weight: 105 kg Est CrCl: 40.7 ml/min; UO: (not measured) ml/kg/hr Temp (24hrs), Av.2 °F (36.8 °C), Min:97.7 °F (36.5 °C), Max:99.2 °F (37.3 °C) Cultures: 
20 - R sternoclavicular joint fluid (intra-op) Goal trough = 15 - 20 mcg/mL Therapy was initiated with a loading dose of 2250 mg IV x 1 dose 20 at 2123, at outside hospital [facility]). Will initiate maintenance dose of 1250 mg IV every 18 hours. Pharmacy to follow patient daily and order levels / make dose adjustments as appropriate.

## 2020-08-21 PROBLEM — M25.9 DISORDER OF STERNOCLAVICULAR JOINT: Status: ACTIVE | Noted: 2020-01-01

## 2020-08-21 NOTE — PROGRESS NOTES
Care Management Interventions PCP Verified by CM: Yes(Dr Yamila Fuentes) Last Visit to PCP: 08/12/20 Palliative Care Criteria Met (RRAT>21 & CHF Dx)?: No 
Mode of Transport at Discharge: (TBD MOST LIKELY [address] ) Transition of Care Consult (CM Consult): (TBD [address] ) MyChart Signup: No 
Discharge Durable Medical Equipment: No 
Physical Therapy Consult: No 
Occupational Therapy Consult: No 
Speech Therapy Consult: No 
Current Support Network: Lives with Spouse(Adonis Winn ph [phone]) Confirm Follow Up Transport: (tbd most likely spouse) Freedom of Choice List was Provided with Basic Dialogue that Supports the Patient's Individualized Plan of Care/Goals, Treatment Preferences and Shares the Quality Data Associated with the Providers?: Yes [employer] Information Provided?: No 
Discharge Location Discharge Placement: (tbd) CRM spoke with patient and she is post op day one from surgery and has a wound vac in place which will be changed on Monday Aug 24th in the OR. Patient has a long complicated history of medical issues and hospitalizations per our conversation. Pt states most recenlty she had been at [facility] this past spring and underwent surgery due to a cellulitis and had an amputation of her left leg. Patient has a long hx of diabetes,endocarditis,htn and atrial fibrillation. Patient did retire from the [facility] this spring. She has gone to [facility] post hospitalization in May and has a wheelchair,shower chair walker and cane at home. She has gone through  A series of wound care clinic visits and has done home IVAB therapy. Patient has used [facility] in the past for home health. Patient uses the [facility] in [place] for her medications. Presently patient does not have an active ACP and declined any further information. Patient is highly motivated when spoken to about tentative discharge plans.   She would like to remain as independent as possible per our conversation. Care management will follow for transitions of care needs. Patient is hopeful that post discharge she could visit the [facility] center at the [address] and if needed the Infusion center located there. Patient does  Have the support of her  [employer] and her goal is to return home when medically stable.

## 2020-08-21 NOTE — PROGRESS NOTES
Thoracic Surgery Simple Progress Note Admit Date: 2020 POD: 1 Day Post-Op Procedure:  Procedure(s): 
INCISION AND DEBRIDEMENT RIGHT STERNOCLAVICULAR JOINT RESECTION OF MIDDLE THIRD OF RIGHT CLAVICLE AND PLACEMENT OF WOUND VAC Subjective:  
 
Patient has complaints: No significant medical complaints, Hungry Review of Systems: 
 
CARDIAC: positive for paroxysmal Afib, s/p pacer removal for lead infection/vegetation RESP: +R sternoclavicular joint infection NEURO:  negative INCISION: Clean, dry, and intact EXT: Denies new swelling or pain in the legs or calves. Objective:  
 
Blood pressure 181/85, pulse (!) 115, temperature 98.9 °F (37.2 °C), resp. rate 22, weight 226 lb 11.2 oz (102.8 kg), SpO2 97 %. Temp (24hrs), Av.4 °F (36.9 °C), Min:97.1 °F (36.2 °C), Max:99.9 °F (37.7 °C) Hemodynamics PAP 
  CO 
  CI No intake/output data recorded.  1901 -  0700 In: 1280 [P.O.:480; I.V.:800] Out: 100 EXAM: 
GENERAL: elevated BP, afrible, alert and cooperative HEART:  regular rhythm, tachy LUNG: clear to auscultation bilaterally. +R Sternoclavicular wound vac w minimal pink SS fluid in appliance NEURO:  normal without focal findings 
mental status, speech normal, A&O x3 
INCISION: Clean, dry, and intact EXTREMITIES:No evidence of DVT seen on physical exam. 
GI/: Abd soft, nontender, obese Labs: 
Recent Results (from the past 24 hour(s)) CULTURE, BLOOD, PAIRED Collection Time: 20 10:05 AM  
 Specimen: Blood Result Value Ref Range Special Requests: NO SPECIAL REQUESTS Culture result: NO GROWTH AFTER 17 HOURS METABOLIC PANEL, BASIC Collection Time: 20 10:17 AM  
Result Value Ref Range Sodium 142 136 - 145 mmol/L Potassium 3.5 3.5 - 5.1 mmol/L Chloride 113 (H) 97 - 108 mmol/L  
 CO2 22 21 - 32 mmol/L Anion gap 7 5 - 15 mmol/L Glucose 146 (H) 65 - 100 mg/dL  BUN 26 (H) 6 - 20 MG/DL  
 Creatinine 1.93 (H) 0.55 - 1.02 MG/DL  
 BUN/Creatinine ratio 13 12 - 20 GFR est AA 32 (L) >60 ml/min/1.73m2 GFR est non-AA 26 (L) >60 ml/min/1.73m2 Calcium 8.9 8.5 - 10.1 MG/DL  
CULTURE, WOUND W GRAM STAIN Collection Time: 08/20/20 10:39 AM  
 Specimen: Tissue RIGHT STERNOCLAVICULAR JOINT A Result Value Ref Range Special Requests: NO SPECIAL REQUESTS    
 GRAM STAIN NO WBC'S SEEN    
 GRAM STAIN NO ORGANISMS SEEN Culture result: PENDING   
CULTURE, WOUND W GRAM STAIN Collection Time: 08/20/20 10:46 AM  
 Specimen: Tissue RIGHT STERNOCLAVICULAR JOINT A Result Value Ref Range Special Requests: NO SPECIAL REQUESTS    
 GRAM STAIN NO WBC'S SEEN    
 GRAM STAIN NO ORGANISMS SEEN Culture result: PENDING   
GLUCOSE, POC Collection Time: 08/20/20 11:49 AM  
Result Value Ref Range Glucose (POC) 131 (H) 65 - 100 mg/dL Performed by Marine Mota GLUCOSE, POC Collection Time: 08/20/20  5:36 PM  
Result Value Ref Range Glucose (POC) 179 (H) 65 - 100 mg/dL Performed by [employer] GLUCOSE, POC Collection Time: 08/20/20  9:35 PM  
Result Value Ref Range Glucose (POC) 218 (H) 65 - 100 mg/dL Performed by [employer] GLUCOSE, POC Collection Time: 08/21/20 12:51 AM  
Result Value Ref Range Glucose (POC) 165 (H) 65 - 100 mg/dL Performed by Lorenzo Hunger METABOLIC PANEL, BASIC Collection Time: 08/21/20  5:15 AM  
Result Value Ref Range Sodium 138 136 - 145 mmol/L Potassium 4.2 3.5 - 5.1 mmol/L Chloride 110 (H) 97 - 108 mmol/L  
 CO2 21 21 - 32 mmol/L Anion gap 7 5 - 15 mmol/L Glucose 124 (H) 65 - 100 mg/dL BUN 28 (H) 6 - 20 MG/DL Creatinine 1.79 (H) 0.55 - 1.02 MG/DL  
 BUN/Creatinine ratio 16 12 - 20 GFR est AA 35 (L) >60 ml/min/1.73m2 GFR est non-AA 29 (L) >60 ml/min/1.73m2 Calcium 8.4 (L) 8.5 - 10.1 MG/DL  
CBC W/O DIFF Collection Time: 08/21/20  5:15 AM  
Result Value Ref Range WBC 11.1 (H) 3.6 - 11.0 K/uL  
 RBC 3.30 (L) 3.80 - 5.20 M/uL HGB 9.6 (L) 11.5 - 16.0 g/dL HCT 31.3 (L) 35.0 - 47.0 % MCV 94.8 80.0 - 99.0 FL  
 MCH 29.1 26.0 - 34.0 PG  
 MCHC 30.7 30.0 - 36.5 g/dL  
 RDW 16.4 (H) 11.5 - 14.5 % PLATELET 389 364 - 243 K/uL MPV 11.2 8.9 - 12.9 FL  
 NRBC 0.0 0  WBC ABSOLUTE NRBC 0.00 0.00 - 0.01 K/uL MAGNESIUM Collection Time: 08/21/20  5:15 AM  
Result Value Ref Range Magnesium 2.3 1.6 - 2.4 mg/dL PHOSPHORUS Collection Time: 08/21/20  5:15 AM  
Result Value Ref Range Phosphorus 3.9 2.6 - 4.7 MG/DL  
GLUCOSE, POC Collection Time: 08/21/20  9:17 AM  
Result Value Ref Range Glucose (POC) 131 (H) 65 - 100 mg/dL Performed by [facility] Assessment:  
No evidence of DVT. Active Problems: 
  Sepsis ([address] (8/20/2020) Disorder of sternoclavicular joint (8/21/2020) PATH:   pending Plan/Recommendations:  
 
Continue wound vac at current settings OOB as tolerated To OR on Mon 8/24/2020 for vac change NPO after MICHAEL Onofre 8/23/2020 See orders Yanna Stewart 
 
8/21/2020

## 2020-08-21 NOTE — PROGRESS NOTES
Hospitalist Progress Note Hospital summary: 60year old female with past medical history HTN, DM Type II on insulin, Atrial fibrillation/flutter, CKD stage III, recent admission 4/19/2020-5/13/2020 for left foot cellulitis/OM s/p left BKA, bacteremia MSSA, endocarditis, Pacemaker vegetation s/p lead extraction, presenting to [facility] as a direct transfer from [facility] emergency department for thoracic surgery evaluation. Patient developed right-sided chest pain/tenderness radiating to right shoulder. Seen at urgent care and found to have atrial fibrillation with RVR. Sent to the emergency department for further evaluation. In the ED, CT chest demonstrated septic arthritis of the right sternal clavicular joint and no abscess. Given Merrem and vancomycin and transferred to [facility] 8/20/2020 Assessment/Plan: 
Sepsis secondary to septic right sternoclavicular joint s/p I&D on 8/20  
- tachycardia, leukocytosis on poa  
-Recent complicated hospitalization with MSSA bacteremia/endocarditis - Thoracic surgery on board - Incision and debridement of right sternoclavicular joint. Resection of medial third of right clavicle. Placement of wound VAC on 8/20 
- leucocytosis - improving  
- blood cultures: NGTD 
- OR cx: staph species - prelim - ID consult pending  
-c/w Broad-spectrum antibiotics: Meropenem, vancomycin (penicillin allergy) - pain meds  On Dilaudid PCA pump  
-  Plan for OR on Mon 8/24/2020 for vac change 
  
Atrial fibrillation with RVR: 
- off Cardizem drip, c/w oral diltiazem 
- HR still in 110's  
- discussed with thoracic surgery Dr. Barrett Pandey: ok with Lovenox full dose for now, coumadin can be restarted after Monday surgery PARAG on CKD stage III - improving 
- secondary to sepsis 
- avoid nephrotoxins - will monitor renal function Diabetes mellitus, type II: 
-Hold long-acting, continue SSI. A1c ordered HTN - BP elevated. Restarted home dose hydralazine. C/w Cardizem . IV hydralzine prn Depression: Home buspirone, Zoloft Code status: Full DVT prophylaxis: Lovenox Disposition: TBD 
 
---------------------------------------------- 
 
CC: Septic right sternoclavicular joint S: Patient is seen and examined at bedside this morning. She still has pian but controlled. Otherwise no other symptoms. Review of Systems: A comprehensive review of systems was negative. O: 
Visit Vitals BP (!) 190/94 Pulse (!) 113 Temp 98.1 °F (36.7 °C) Resp 20 Ht 5' 10\" (1.778 m) Wt 102.8 kg (226 lb 11.2 oz) SpO2 96% BMI 32.53 kg/m² PHYSICAL EXAM: 
Gen: mild distress due to pain HEENT: anicteric sclerae, normal conjunctiva, oropharynx clear, MM moist 
Neck: supple, trachea midline, no adenopathy Heart: Tachy, irregularly irregular , no MRG, no JVD, no peripheral edema Lungs: CTA b/l, non-labored respirations. +R Sternoclavicular wound vac Abd: soft, NT, ND, BS+, no organomegaly Extr: warm Skin: dry, no rash Neuro: CN II-XII grossly intact, normal speech, moves all extremities Psych: normal mood, appropriate affect Intake/Output Summary (Last 24 hours) at 8/21/2020 1344 Last data filed at 8/21/2020 2586 Gross per 24 hour Intake 780 ml Output  Net 780 ml Recent labs & imaging reviewed: 
Recent Results (from the past 24 hour(s)) GLUCOSE, POC Collection Time: 08/20/20  5:36 PM  
Result Value Ref Range Glucose (POC) 179 (H) 65 - 100 mg/dL Performed by Francine Sandoval GLUCOSE, POC Collection Time: 08/20/20  9:35 PM  
Result Value Ref Range Glucose (POC) 218 (H) 65 - 100 mg/dL Performed by Francine Sandoval GLUCOSE, POC Collection Time: 08/21/20 12:51 AM  
Result Value Ref Range Glucose (POC) 165 (H) 65 - 100 mg/dL [facility] by [facility] METABOLIC PANEL, BASIC Collection Time: 08/21/20  5:15 AM  
Result Value Ref Range Sodium 138 136 - 145 mmol/L Potassium 4.2 3.5 - 5.1 mmol/L Chloride 110 (H) 97 - 108 mmol/L  
 CO2 21 21 - 32 mmol/L Anion gap 7 5 - 15 mmol/L Glucose 124 (H) 65 - 100 mg/dL BUN 28 (H) 6 - 20 MG/DL Creatinine 1.79 (H) 0.55 - 1.02 MG/DL  
 BUN/Creatinine ratio 16 12 - 20 GFR est AA 35 (L) >60 ml/min/1.73m2 GFR est non-AA 29 (L) >60 ml/min/1.73m2 Calcium 8.4 (L) 8.5 - 10.1 MG/DL  
CBC W/O DIFF Collection Time: 08/21/20  5:15 AM  
Result Value Ref Range WBC 11.1 (H) 3.6 - 11.0 K/uL  
 RBC 3.30 (L) 3.80 - 5.20 M/uL HGB 9.6 (L) 11.5 - 16.0 g/dL HCT 31.3 (L) 35.0 - 47.0 % MCV 94.8 80.0 - 99.0 FL  
 MCH 29.1 26.0 - 34.0 PG  
 MCHC 30.7 30.0 - 36.5 g/dL  
 RDW 16.4 (H) 11.5 - 14.5 % PLATELET 527 249 - 096 K/uL MPV 11.2 8.9 - 12.9 FL  
 NRBC 0.0 0  WBC ABSOLUTE NRBC 0.00 0.00 - 0.01 K/uL MAGNESIUM Collection Time: 08/21/20  5:15 AM  
Result Value Ref Range Magnesium 2.3 1.6 - 2.4 mg/dL PHOSPHORUS Collection Time: 08/21/20  5:15 AM  
Result Value Ref Range Phosphorus 3.9 2.6 - 4.7 MG/DL  
GLUCOSE, POC Collection Time: 08/21/20  9:17 AM  
Result Value Ref Range Glucose (POC) 131 (H) 65 - 100 mg/dL Performed by Margaret Redmond, POC Collection Time: 08/21/20 11:36 AM  
Result Value Ref Range Glucose (POC) 135 (H) 65 - 100 mg/dL Performed by Duran West Recent Labs  
  08/21/20 
0515 08/19/20 
9645 WBC 11.1* 15.6* HGB 9.6* 11.9 HCT 31.3* 39.1  342 Recent Labs  
  08/21/20 
0515 08/20/20 
1017 08/19/20 
1757  142 135* K 4.2 3.5 4.2 * 113* 106 CO2 21 22 20* BUN 28* 26* 24* CREA 1.79* 1.93* 2.16* * 146* 205* CA 8.4* 8.9 8.5 MG 2.3  --   --   
PHOS 3.9  --   --   
 
Recent Labs 08/19/20 
1757 ALT 12  
* TBILI 0.7 TP 8.2 ALB 2.8*  
GLOB 5.4* Recent Labs 08/19/20 
1847 INR 1.1 PTP 11.4*  
  
 No results for input(s): FE, TIBC, PSAT, FERR in the last 72 hours. Lab Results Component Value Date/Time Folate 8.5 03/14/2020 02:49 PM  
  
No results for input(s): PH, PCO2, PO2 in the last 72 hours. Recent Labs 08/19/20 
1757 TROIQ <0.05 Lab Results Component Value Date/Time Cholesterol, total 141 11/11/2019 08:47 AM  
 HDL Cholesterol 32 (L) 11/11/2019 08:47 AM  
 LDL, calculated 82 11/11/2019 08:47 AM  
 Triglyceride 137 11/11/2019 08:47 AM  
 
Lab Results Component Value Date/Time Glucose (POC) 135 (H) 08/21/2020 11:36 AM  
 Glucose (POC) 131 (H) 08/21/2020 09:17 AM  
 Glucose (POC) 165 (H) 08/21/2020 12:51 AM  
 Glucose (POC) 218 (H) 08/20/2020 09:35 PM  
 Glucose (POC) 179 (H) 08/20/2020 05:36 PM  
 
Lab Results Component Value Date/Time Color YELLOW/STRAW 05/23/2020 02:35 PM  
 Appearance CLOUDY (A) 05/23/2020 02:35 PM  
 Specific gravity 1.009 05/23/2020 02:35 PM  
 pH (UA) 6.0 05/23/2020 02:35 PM  
 Protein Negative 05/23/2020 02:35 PM  
 Glucose Negative 05/23/2020 02:35 PM  
 Ketone Negative 05/23/2020 02:35 PM  
 Bilirubin Negative 05/23/2020 02:35 PM  
 Urobilinogen 0.2 05/23/2020 02:35 PM  
 Nitrites Positive (A) 05/23/2020 02:35 PM  
 Leukocyte Esterase LARGE (A) 05/23/2020 02:35 PM  
 Epithelial cells FEW 05/23/2020 02:35 PM  
 Bacteria 4+ (A) 05/23/2020 02:35 PM  
 WBC >100 (H) 05/23/2020 02:35 PM  
 RBC 0-5 05/23/2020 02:35 PM  
 
 
Med list reviewed Current Facility-Administered Medications Medication Dose Route Frequency  hydrALAZINE (APRESOLINE) 20 mg/mL injection 20 mg  20 mg IntraVENous Q6H PRN  
 meropenem (MERREM) 500 mg in 0.9% sodium chloride (MBP/ADV) 50 mL  500 mg IntraVENous Q8H  
 busPIRone (BUSPAR) tablet 10 mg  10 mg Oral BID  dilTIAZem ER (CARDIZEM CD) capsule 180 mg  180 mg Oral DAILY  sertraline (ZOLOFT) tablet 75 mg  75 mg Oral DAILY  sodium chloride (NS) flush 5-40 mL  5-40 mL IntraVENous Q8H  
  sodium chloride (NS) flush 5-40 mL  5-40 mL IntraVENous PRN  
 acetaminophen (TYLENOL) tablet 650 mg  650 mg Oral Q6H PRN Or  
 acetaminophen (TYLENOL) suppository 650 mg  650 mg Rectal Q6H PRN  polyethylene glycol (MIRALAX) packet 17 g  17 g Oral DAILY PRN  promethazine (PHENERGAN) tablet 12.5 mg  12.5 mg Oral Q6H PRN Or  
 ondansetron (ZOFRAN) injection 4 mg  4 mg IntraVENous Q6H PRN  
 enoxaparin (LOVENOX) injection 40 mg  40 mg SubCUTAneous Q24H  
 HYDROmorphone (PF) 25 mg/50 mL (DILAUDID) PCA   IntraVENous CONTINUOUS  Vancomycin - pharmacy to dose   Other Rx Dosing/Monitoring  vancomycin (VANCOCIN) 1250 mg in  ml infusion  1,250 mg IntraVENous Q24H  
 glucose chewable tablet 16 g  4 Tab Oral PRN  
 glucagon (GLUCAGEN) injection 1 mg  1 mg IntraMUSCular PRN  
 dextrose 10% infusion 0-250 mL  0-250 mL IntraVENous PRN  
 gabapentin (NEURONTIN) capsule 100 mg  100 mg Oral TID PRN  
 insulin regular (NOVOLIN R, HUMULIN R) injection   SubCUTAneous AC&HS Care Plan discussed with:  Patient/Family and Nurse Armando Lamas MD 
Internal Medicine Date of Service: 8/21/2020

## 2020-08-21 NOTE — WOUND CARE
WOUND VAC check. Wound VAC applied in OR by Dr. Juana Hanna, good seal, 75 mmhg continuous. Wound care available if needed. Spoke with Yanna Canales, VAC orders obtained. Recommendations: 
VAC (NPWT) Dressing Orders: 
VAC Settings: 75 mmHg continuous/low suction Dressing change in OR unless Dr. Juana Hanna decides otherwise. Change canisters when full and measure output q shift. (located  or [facility] supply room). For sudden development of blood or an increased amount of jacinta bleedin. Immediately turn off VAC system. 2.  Leave dressing in place. 3.  Hold pressure over wound. 4.  Call physician. If vacuum fails to maintain seal or unable to manage alarm for clogged tubing for >2hrs:    
 1. Contact Physician If patient is discharged from our facility: 1. Contact physician for remove all of equipment, sponge and placement of dressing. 2.  Put VAC system and power cord in clear bag in utility room. (no red bags) For procedures or when pt is off the floor:  
Battery backup on our current inpatient systems lasts for 4 hours and may be used to prevent interruption of treatment- VAC should NOT be turned off. If disconnecting for more than 2 hours, notify physician. DARREN Lowry, RN, [facility], [facility]. Office x [phone] Pager x [phone]

## 2020-08-21 NOTE — PROGRESS NOTES
0800- Bedside shift change report given to Abimbola Dixon RN by Alvarez Wright RN. Report included the following information SBAR, Kardex, ED Summary, Intake/Output, MAR, Recent Results, and Cardiac Rhythm Afib . Last 3 Recorded Weights in this Encounter 08/20/20 8788 08/21/20 1119 Weight: 105 kg (231 lb 6.4 oz) 102.8 kg (226 lb 11.2 oz) Weight taken with woundvac removed from bed frame Problem: Risk for Spread of Infection Goal: Prevent transmission of infectious organism to others Description: Prevent the transmission of infectious organisms to other patients, staff members, and visitors. Outcome: Progressing Towards Goal 
  
Problem: Falls - Risk of 
Goal: *Absence of Falls Description: Document Joel Pruitt Fall Risk and appropriate interventions in the flowsheet. Outcome: Progressing Towards Goal 
Note: Fall Risk Interventions: 
Mobility Interventions: PT Consult for mobility concerns Medication Interventions: Patient to call before getting OOB Elimination Interventions: Call light in reach Problem: Pain Goal: *Control of Pain Outcome: Progressing Towards Goal

## 2020-08-21 NOTE — PROGRESS NOTES
Bedside shift change report given to GURINDER Soliman (oncoming nurse) by Deja Kilgore RN (offgoing nurse). Report included the following information SBAR, Kardex, ED Summary, Recent Results and Cardiac Rhythm A Fib. Problem: Risk for Spread of Infection Goal: Prevent transmission of infectious organism to others Description: Prevent the transmission of infectious organisms to other patients, staff members, and visitors. Outcome: Progressing Towards Goal 
Pt is in a droplet + contact isolation. Problem: Falls - Risk of 
Goal: *Absence of Falls Description: Document Joel Pruitt Fall Risk and appropriate interventions in the flowsheet. Outcome: Progressing Towards Goal 
Note: Fall Risk Interventions: 
Mobility Interventions: Communicate number of staff needed for ambulation/transfer, Patient to call before getting OOB Medication Interventions: Patient to call before getting OOB, Evaluate medications/consider consulting pharmacy Elimination Interventions: Patient to call for help with toileting needs

## 2020-08-21 NOTE — PROGRESS NOTES
Comprehensive Nutrition Assessment Type and Reason for Visit: Wound Nutrition Recommendations/Plan: 1. Continue consistent CHO diet. Pt does not want ONS. Encouraged PO/ protein for wound healing. 2. Consider addition of daily MVI, Vit C 500 mg BID, and zinc sulfate 220 mg daily x 10 days for wound healing Nutrition Assessment:   
60year old female with past medical history HTN, DM Type II on insulin, Atrial fibrillation/flutter, CKD stage III, recent admission 4/19/2020-5/13/2020 for left foot cellulitis/OM s/p left BKA, bacteremia MSSA, endocarditis, Pacemaker vegetation s/p lead extraction, presenting to [facility] as a direct transfer from [facility] emergency department for thoracic surgery evaluation. Noted: septic on admission 2° infected sternoclavicular joint - now s/p I&D, resection of medial 3rd of R clavicle, and placement of wound VAC on 8/20. Pt discussed in rounds. Screened by RD d/t wound vac. Poor PO reported by nursing. Suspected wt loss based on wt hx below. Spoke with pt - she reports wt loss, but unsure of amount. Tells me her UBW is between 240-275 lb (retains fluid). Reports she lost 40 lb during prolonged hospitalization back in April/May, but thought she had gain some of it back. Difficult to discern what wt is fluid and/or d/t amputation. Some muscle atrophy expected as well. Appetite fair - states she would eat better if they actually sent her food. Reports we didn't send breakfast, but did get lunch. Aware of protein sources and need for adequate nutrition with wound healing. States she hates the Glucerna and doesn't want any supplements. Expressed little concern or need for this service. Using highest BW in past 8-9 months, pt has lost about 37 lb (14% BW) - which doesn't seem significant given amputation and fluid. However, given recent issues and wound, she is at risk for malnutrition d/t increased nutrient needs. RD will continue to follow and monitor for PO adequacy. Wt Readings from Last 20 Encounters:  
08/21/20 102.8 kg (226 lb 11.2 oz) - bed  
08/20/20 104.9 kg (231 lb 6.4 oz) - bed  
08/19/20 109.8 kg (242 lb) - stated 05/06/20 110.2 kg (243 lb)  
03/26/20 113.9 kg (251 lb)  
03/19/20 108.2 kg (238 lb 8.6 oz) 01/24/20 111.6 kg (246 lb)  
01/14/20 113.5 kg (250 lb 3.6 oz)  
12/12/19 119.3 kg (263 lb) 12/06/19 113.9 kg (251 lb 1.7 oz)  
11/13/19 115.4 kg (254 lb 8 oz) 09/27/19 115.2 kg (254 lb) 09/03/19 118.2 kg (260 lb 9.3 oz) 08/15/19 117.8 kg (259 lb 9.6 oz) 08/09/19 118 kg (260 lb 2.3 oz) 07/10/19 117.9 kg (260 lb) 06/06/19 118.8 kg (262 lb) 05/30/19 118.2 kg (260 lb 8 oz) 04/08/19 118.8 kg (261 lb 12.8 oz) 03/11/19 122.2 kg (269 lb 8 oz)  
11/12/18 126.6 kg (279 lb) Malnutrition Assessment: 
Malnutrition Status: At risk for malnutrition (specify)(increased needs) Nutritionally Significant Medications: Buspar, Novolin R, Merrem, vancomycin Estimated Daily Nutrient Needs: 
Energy (kcal):  2200(MSJ x 1.2 x 1.1) Protein (g):  110(20%) Fluid (ml/day):  1 mL/kcal 
 
Nutrition Related Findings:   
Edema: none Last BM: PTA Wounds:   
Wound vac(sternoclavicular joint) Current Nutrition Therapies: DIET DIABETIC CONSISTENT CARB Regular DIET NPO - after MN 8/23 for OR on 8/24 Meal intake: No data found. Anthropometric Measures: 
· Height:  5' 10\" (177.8 cm) · Current Body Wt:  102.8 kg (226 lb 10.1 oz) · Admission Body Wt:  231 lb 4.2 oz · Usual Body Wt:  (240-275 lb) · Ideal Body Wt:    lb:  151.1 % · Adjusted Body Weight:  240 lb; Weight Adjustment for: Amputation · Adjusted BMI:  34.4 · BMI Categories:  Obese class 1 (BMI 30.0-34. 9) Nutrition Diagnosis:  
· Increased nutrient needs related to increased demand for energy/nutrients as evidenced by wounds Nutrition Interventions: Food and/or Nutrient Delivery: Continue current diet, Start oral nutrition supplement Nutrition Education and Counseling: Education initiated Coordination of Nutrition Care: Interdisciplinary rounds, Continued inpatient monitoring Goals: 
PO >50% of meals with 1-2 ONS supplements daily over next 5-7 days Nutrition Monitoring and Evaluation:  
Behavioral-Environmental Outcomes: Beliefs and attitudes Food/Nutrient Intake Outcomes: Food and nutrient intake, Supplement intake Physical Signs/Symptoms Outcomes: Biochemical data, GI status, Skin, Weight, Fluid status or edema Discharge Planning: Too soon to determine Recent Labs  
  08/21/20 
0515 08/20/20 
1017 08/19/20 
1757 * 146* 205* BUN 28* 26* 24* CREA 1.79* 1.93* 2.16*  142 135* K 4.2 3.5 4.2 * 113* 106 CO2 21 22 20* CA 8.4* 8.9 8.5 PHOS 3.9  --   --   
MG 2.3  --   --   
 
 
Recent Labs 08/19/20 
1757 ALT 12  
* TBILI 0.7 TP 8.2 ALB 2.8*  
GLOB 5.4* No results for input(s): LAC in the last 72 hours. Recent Labs  
  08/21/20 
0515 08/19/20 
1757 WBC 11.1* 15.6* HGB 9.6* 11.9 HCT 31.3* 39.1  342 No results for input(s): PREALB in the last 72 hours. No results for input(s): TRIGL in the last 72 hours. Recent Labs  
  08/21/20 
1136 08/21/20 
0917 08/21/20 
0051 08/20/20 
2135 08/20/20 
1736 08/20/20 
1149 GLUCPOC 135* 131* 165* 218* 179* 131* Lab Results Component Value Date/Time Hemoglobin A1c 7.5 (H) 03/08/2020 07:09 PM  
 Hemoglobin A1c (POC) 7.2 07/10/2019 03:14 PM  
 
 
Iron Profile Iron Date Value Ref Range Status 03/11/2020 18 (L) 35 - 150 ug/dL Final  
 
TIBC Date Value Ref Range Status 03/11/2020 183 (L) 250 - 450 ug/dL Final  
 
Iron % saturation Date Value Ref Range Status 03/11/2020 10 (L) 20 - 50 % Final  
 
 
Ferritin Date Value Ref Range Status 04/14/2010 10 8 - 252 NG/ML Final  
 
 
B Vitamins Folate Date Value Ref Range Status 03/14/2020 8.5 5.0 - 21.0 ng/mL Final  
 
Vitamin B12 Date Value Ref Range Status 03/14/2020 1,403 (H) 193 - 986 pg/mL Final  
 
 
Zinc 
No results found for: [employer] No results found for: RUMA Estrada RD Available via [facility] Pager# [phone]

## 2020-08-22 NOTE — PROGRESS NOTES
Problem: Risk for Spread of Infection Goal: Prevent transmission of infectious organism to others Description: Prevent the transmission of infectious organisms to other patients, staff members, and visitors. Outcome: Progressing Towards Goal 
Note: Patient is currently on modified contact for ESBL. Problem: Falls - Risk of 
Goal: *Absence of Falls Description: Document Rebeca Downey Fall Risk and appropriate interventions in the flowsheet. Outcome: Progressing Towards Goal 
Note: Fall Risk Interventions: 
Mobility Interventions: Communicate number of staff needed for ambulation/transfer, Patient to call before getting OOB Medication Interventions: Evaluate medications/consider consulting pharmacy, Patient to call before getting OOB, Teach patient to arise slowly Elimination Interventions: Call light in reach, Patient to call for help with toileting needs, Toileting schedule/hourly rounds Problem: Pain Goal: *Control of Pain Outcome: Progressing Towards Goal 
Note: Patient is currently on a diluadid PCA pump.

## 2020-08-22 NOTE — PROGRESS NOTES
Day #3 of Vancomycin Indication:  suspected infected sternoclavicular joint; recent complicated hospitalization with MSSA bacteremia/endocarditis; s/p Incision and debridement of right sternoclavicular joint; resection of medial third of right clavicle; placement of wound VAC. Current regimen:  1250 mg IV Q 24 hours Abx regimen:  Vanc + Meropenem ID Following ?: YES-consulted but hasn't seen yet Concomitant nephrotoxic drugs (requires more frequent monitoring): None Frequency of BMP?: Daily through  Recent Labs  
  20 
0426 20 
0515 20 
1017 20 
1757 WBC 7.7 11.1*  --  15.6* CREA 1.90* 1.79* 1.93* 2.16* BUN 30* 28* 26* 24* Est CrCl: ~40 ml/min Temp (24hrs), Av.6 °F (37 °C), Min:97.8 °F (36.6 °C), Max:100 °F (37.8 °C) Cultures:  
 Blood-NGTD 
 Wound (Right sternoclavicular joint) x2 - heavy possible staph aureus, pending Goal trough = ~15 mcg/mL (if MSSA, target 10-15 mcg/mL) Recent trough history (date/time/level/dose/action taken): 
Na 
 
SCr back up today but dose still appropriate (anticipated trough of 15.5 mcg/mL). Plan: Continue current regimen. Ordered trough level prior to the fourth total dose on  @ 0000. Ge Mayberry, PharmD, BCPP, BCPS Clinical Pharmacy Specialist, [address] [place] [address]

## 2020-08-22 NOTE — CONSULTS
Infectious Disease Consult Impression/Plan · Septic arthritis involving the right sternoclavicular joint. Status post I&D with resection of the medial third of the right clavicle. Operative cultures are growing MSSA. History of penicillin allergy and rhabdomyolysis occurring on daptomycin. She is refusing cephalosporin antibiotics as she believes she had a adverse reaction to cefazolin in the past.  Will clarify with Dr. Andree Vincent on Monday. In the meantime continue vancomycin · History of MSSA bacteremia with pacemaker lead infection. Status post removal of pacemaker on 4/27/2020. Patient was on extended course of IV antibiotics through 6/11/2020 · Rhabdomyolysis due to daptomycin · History of sulfa and penicillin allergies · PARAG/CKD · Diabetes mellitus. Hemoglobin A1c 6.1 Anti-infectives:  
 
 
Subjective:  
Date of Consultation:  August 22, 2020 Date of Admission: 8/20/2020 Referring Physician 60year old female with past medical history HTN, DM Type II on insulin, Atrial fibrillation/flutter, CKD stage III, recent admission 4/19/2020-5/13/2020 for left foot cellulitis/OM s/p left BKA, bacteremia MSSA, endocarditis, pacemaker vegetation s/p lead extraction, presenting to [facility] as a direct transfer from [facility] emergency department for thoracic surgery evaluation. Patient developed right-sided chest pain/tenderness radiating to right shoulder. Seen at urgent care and found to have atrial fibrillation with RVR. Sent to the emergency department for further evaluation. In the ED, CT chest demonstrated septic arthritis of the right sternal clavicular joint and no abscess. Given Merrem and vancomycin and transferred to [facility]. 
 
The patient was seen by thoracic surgery and on 8/20/2020 she was taken to the OR where she underwent I&D of the right sternoclavicular joint and resection of the medial third of the right clavicle.   Operative cultures are growing MSSA. She is currently on vancomycin and meropenem. The infectious diseases service has been asked to assist with antibiotic management Patient Active Problem List  
Diagnosis Code  History of breast cancer Z85.3  Asthma J45.909  Fe deficiency anemia D50.9  Status post ablation of atrial fibrillation Z98.890, Z86.79  
 Sick sinus syndrome ([facility]) I49.5  S/P cardiac pacemaker procedure Z95.0  Long term (current) use of anticoagulants Z79.01  
 Mixed hyperlipidemia E78.2  CKD (chronic kidney disease), stage IV ([facility]) N18.4  Systolic murmur R05.1  Essential hypertension I10  
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  
 H/O bilateral mastectomy Z90.13  Left below-knee amputee [facility]) Y28.312  Sepsis ([facility] A41.9  Disorder of sternoclavicular joint M25.9 Past Medical History:  
Diagnosis Date  Acquired lymphedema Right arm  Arrhythmia  Asthma  Atrial fibrillation ([facility] Dr. Tem[facility]  Cancer ([facility] bilateral breast  
 Chronic kidney disease  Diabetes mellitus type II   
  Diabetic ulcer of left heel associated with type 2 diabetes mellitus, with fat layer exposed ([facility] 6/21/2019  Diabetic ulcer of left midfoot with necrosis of muscle ([facility] 3/3/2020  Dizziness  Essential hypertension  Hyperlipidemia  Hypertension  Long term (current) use of anticoagulants  Morbid obesity ([address] 3/14/2012  Nausea & vomiting  Neuropathy  Osteoarthritis  Pacemaker  Sick sinus syndrome ([facility]  Type 2 diabetes mellitus with left diabetic foot infection ([facility] 10/29/2019 Family History Problem Relation Age of Onset  Cancer Mother  Heart Disease Mother  Alcohol abuse Father  Diabetes Brother  Hypertension Brother Social History Tobacco Use  Smoking status: Never Smoker  Smokeless tobacco: Never Used Substance Use Topics  Alcohol use: Not Currently Past Surgical History:  
Procedure Laterality Date  ABDOMEN SURGERY PROC UNLISTED    
 gastric bypass  GASTRIC BYPASS,OBESE<150CM SAW-EN-Y  7/08  
 [place]  HX AFIB ABLATION    
 HX BREAST RECONSTRUCTION Bilateral   
 HX CARPAL TUNNEL RELEASE [address] RIGHT MODIFIED RADICAL   
 HX MASTECTOMY Left   
 no lymphnode removal  
 HX MOHS PROCEDURES  1995  
 right  HX ORTHOPAEDIC Right   
 knee surgery  HX PACEMAKER    
 HX PACEMAKER    
 IR INSERT TUNL CVC W PORT OVER 5 YEARS    
 IR INSERT TUNL CVC W/O PORT OVER 5 YR  5/4/2020  IR REMOVE TUNL CVAD W/O PORT / PUMP  6/26/2020  [address]  8.21.07  
 WI [address] AND 1994  WI RELOCATION OF SKIN POCKET FOR PACEMAKER N/A 4/24/2020 RELOCATE 2 [facility] performed by Shyla Spivey MD at [address] CATH LAB  WI RMVL TRANSVNS PM ELTRD 1 LEAD SYS ATR/VENTR N/A 4/24/2020 Remove Pacemaker Dual Leads performed by Berwyn Bloch, MD at [facility] CATH LAB  MO RMVL TRANSVNS PM ELTRD 1 LEAD SYS ATR/VENTR N/A 4/27/2020 REMOVE PACEMAKER DUAL LEADS performed by Berwyn Bloch, MD at [address] [facility] CATH LAB  MO TRABECULOPLASTY BY LASER SURGERY    
 US GUIDE ASP OVARIAN CYST ABD APPROACH  8.21.07  
 RIGHT Prior to Admission medications Medication Sig Start Date End Date Taking? Authorizing Provider  
bumetanide (BUMEX) 1 mg tablet Take 2 mg by mouth daily. Yes Provider, Historical  
insulin glargine (Lantus Solostar U-100 Insulin) 100 unit/mL (3 mL) inpn 10 Units by SubCUTAneous route Daily (before breakfast). Yes Provider, Historical  
sertraline (Zoloft) 50 mg tablet Take 75 mg by mouth daily. Yes Provider, Historical  
warfarin (COUMADIN) 2 mg tablet Take 1 tablet po daily 7/27/20  Yes Urvashi Abrams MD  
Cartia  mg capsule Take 180 mg by mouth daily. 6/12/20  Yes Provider, Historical  
hydrALAZINE (APRESOLINE) 100 mg tablet Take 1 Tab by mouth three (3) times daily. 6/12/20  Yes Urvashi Abrams MD  
busPIRone (BUSPAR) 10 mg tablet TAKE ONE TABLET BY MOUTH TWICE A DAY 5/24/19  Yes Urvashi Abrams MD  
insulin lispro (HUMALOG) 100 unit/mL kwikpen 2 Units by SubCUTAneous route after meals as needed. 2 units with dinner for sugars over 200 1/3/14  Yes Urvashi Abrams MD  
 
Allergies Allergen Reactions  Ace Inhibitors Cough  Amlodipine Swelling  Avapro [Irbesartan] Unable to Obtain  Bactrim [Sulfamethoxazole-Trimethoprim] Other (comments) Sore mouth  Captopril Cough  Carvedilol Diarrhea  Contrast Agent [Iodine] Itching [address]  Morphine Nausea and Vomiting and Swelling  Penicillins Hives Did not tolerate cefepime 3/2020  Pravachol [Pravastatin] Nausea Only  Seafood [Shellfish Containing Products] Hives  Singulair [Montelukast] Other (comments)  
  palpitations Review of Systems:  A comprehensive review of systems was negative except for that written in the History of Present Illness. Objective:  
Blood pressure 153/63, pulse (!) 107, temperature 99.9 °F (37.7 °C), resp. rate 30, height 5' 10\" (1.778 m), weight 225 lb 15.5 oz (102.5 kg), SpO2 97 %. Temp (24hrs), Av.9 °F (37.2 °C), Min:97.8 °F (36.6 °C), Max:100 °F (37.8 °C) Exam:   
General:  Alert, cooperative, well noursished, well developed, appears stated age Eyes:  Sclera anicteric. Mouth/Throat: Mucous membranes moist  
Neck: Supple Lungs:   Clear to auscultation CV:  Regular rate and rhythm Abdomen:   Soft, non-tender. ND Extremities:  Right chest wall wound VAC Skin:  No rash Lymph nodes: Musculoskeletal:   
Lines/Devices:  Intact, no erythema, drainage or tenderness Psych: Alert and oriented, normal mood affect given the setting Data Review:  
Recent Results (from the past 24 hour(s)) GLUCOSE, POC Collection Time: 20  5:39 PM  
Result Value Ref Range Glucose (POC) 186 (H) 65 - 100 mg/dL Performed by Lucia Pardo, POC Collection Time: 20  9:07 PM  
Result Value Ref Range Glucose (POC) 229 (H) 65 - 100 mg/dL Performed by Monalisa Davenport PCT METABOLIC PANEL, BASIC Collection Time: 20  4:26 AM  
Result Value Ref Range Sodium 139 136 - 145 mmol/L Potassium 4.2 3.5 - 5.1 mmol/L Chloride 111 (H) 97 - 108 mmol/L  
 CO2 22 21 - 32 mmol/L Anion gap 6 5 - 15 mmol/L Glucose 99 65 - 100 mg/dL BUN 30 (H) 6 - 20 MG/DL Creatinine 1.90 (H) 0.55 - 1.02 MG/DL  
 BUN/Creatinine ratio 16 12 - 20 GFR est AA 33 (L) >60 ml/min/1.73m2 GFR est non-AA 27 (L) >60 ml/min/1.73m2 Calcium 8.9 8.5 - 10.1 MG/DL  
CBC WITH AUTOMATED DIFF Collection Time: 20  4:26 AM  
Result Value Ref Range WBC 7.7 3.6 - 11.0 K/uL  
 RBC 3.30 (L) 3.80 - 5.20 M/uL HGB 9.6 (L) 11.5 - 16.0 g/dL HCT 32.3 (L) 35.0 - 47.0 % MCV 97.9 80.0 - 99.0 FL  
 MCH 29.1 26.0 - 34.0 PG  
 MCHC 29.7 (L) 30.0 - 36.5 g/dL  
 RDW 16.4 (H) 11.5 - 14.5 % PLATELET 067 (H) 648 - 400 K/uL MPV 11.3 8.9 - 12.9 FL  
 NRBC 0.0 0  WBC ABSOLUTE NRBC 0.00 0.00 - 0.01 K/uL NEUTROPHILS 67 32 - 75 % LYMPHOCYTES 20 12 - 49 % MONOCYTES 9 5 - 13 % EOSINOPHILS 2 0 - 7 % BASOPHILS 1 0 - 1 % IMMATURE GRANULOCYTES 1 (H) 0.0 - 0.5 % ABS. NEUTROPHILS 5.2 1.8 - 8.0 K/UL  
 ABS. LYMPHOCYTES 1.6 0.8 - 3.5 K/UL  
 ABS. MONOCYTES 0.7 0.0 - 1.0 K/UL  
 ABS. EOSINOPHILS 0.1 0.0 - 0.4 K/UL  
 ABS. BASOPHILS 0.1 0.0 - 0.1 K/UL  
 ABS. IMM. GRANS. 0.1 (H) 0.00 - 0.04 K/UL  
 DF AUTOMATED    
GLUCOSE, POC Collection Time: 08/22/20  8:01 AM  
Result Value Ref Range Glucose (POC) 107 (H) 65 - 100 mg/dL Performed by Michell Pierce GLUCOSE, POC Collection Time: 08/22/20 12:24 PM  
Result Value Ref Range Glucose (POC) 151 (H) 65 - 100 mg/dL Performed by Michell Pierce Microbiology:   
 
Studies:   
 
Signed By: Steve Paris DO August 22, 2020

## 2020-08-22 NOTE — PROGRESS NOTES
Problem: Risk for Spread of Infection Goal: Prevent transmission of infectious organism to others Description: Prevent the transmission of infectious organisms to other patients, staff members, and visitors. Outcome: Progressing Towards Goal 
  
Problem: Falls - Risk of 
Goal: *Absence of Falls Description: Document Bishnu Elincer Fall Risk and appropriate interventions in the flowsheet. Outcome: Progressing Towards Goal 
Note: Fall Risk Interventions: 
Mobility Interventions: Communicate number of staff needed for ambulation/transfer, OT consult for ADLs, Patient to call before getting OOB, PT Consult for mobility concerns, PT Consult for assist device competence, Utilize walker, cane, or other assistive device, Utilize gait belt for transfers/ambulation, Assess mobility with egress test 
 
  
 
Medication Interventions: Evaluate medications/consider consulting pharmacy, Patient to call before getting OOB, Teach patient to arise slowly Elimination Interventions: Call light in reach, Patient to call for help with toileting needs, Stay With Me (per policy), Toilet paper/wipes in reach, Toileting schedule/hourly rounds Problem: Pain Goal: *Control of Pain Outcome: Progressing Towards Goal 
  
Problem: Pressure Injury - Risk of 
Goal: *Prevention of pressure injury Description: Document Valdemar Scale and appropriate interventions in the flowsheet. Outcome: Progressing Towards Goal 
Note: Pressure Injury Interventions: 
Sensory Interventions: Float heels, Check visual cues for pain, Avoid rigorous massage over bony prominences, Assess changes in LOC, Minimize linen layers, Monitor skin under medical devices, Pad between skin to skin, Turn and reposition approx. every two hours (pillows and wedges if needed) Activity Interventions: Increase time out of bed, Pressure redistribution bed/mattress(bed type), PT/OT evaluation Mobility Interventions: Float heels, HOB 30 degrees or less, Pressure redistribution bed/mattress (bed type), PT/OT evaluation, Turn and reposition approx. every two hours(pillow and wedges) Nutrition Interventions: Document food/fluid/supplement intake Friction and Shear Interventions: Foam dressings/transparent film/skin sealants, HOB 30 degrees or less, Lift sheet, Lift team/patient mobility team, Minimize layers

## 2020-08-22 NOTE — PROGRESS NOTES
Hospitalist Progress Note Hospital summary: 60year old female with past medical history HTN, DM Type II on insulin, Atrial fibrillation/flutter, CKD stage III, recent admission 4/19/2020-5/13/2020 for left foot cellulitis/OM s/p left BKA, bacteremia MSSA, endocarditis, Pacemaker vegetation s/p lead extraction, presenting to [facility] as a direct transfer from [address] [place] [address] emergency department for thoracic surgery evaluation. Patient developed right-sided chest pain/tenderness radiating to right shoulder. Seen at urgent care and found to have atrial fibrillation with RVR. Sent to the emergency department for further evaluation. In the ED, CT chest demonstrated septic arthritis of the right sternal clavicular joint and no abscess. Given Merrem and vancomycin and transferred to [facility] 8/20/2020 Assessment/Plan: 
Sepsis secondary to septic right sternoclavicular joint s/p I&D on 8/20  
- tachycardia, leukocytosis on poa  
-Recent complicated hospitalization with MSSA bacteremia/endocarditis - Thoracic surgery on board - Incision and debridement of right sternoclavicular joint. Resection of medial third of right clavicle. Placement of wound VAC on 8/20 
- leucocytosis - resolved - blood cultures: NGTD 
- OR cx: staph species - prelim - ID consult pending  
-c/w Broad-spectrum IV antibiotics: Meropenem, vancomycin (penicillin allergy) - pain meds - on Dilaudid PCA pump , need to transition to po meds and IV meds prn on Monday after OR 
-  Plan for OR on Mon 8/24/2020 for vac change 
  
Atrial fibrillation with RVR: 
- off Cardizem drip, c/w oral diltiazem 
- HR still in 100's - 8/21: discussed with thoracic surgery Dr. Anamika Pelayo: ok with Lovenox full dose for now, coumadin can be restarted after Monday surgery PARAG on CKD stage III - cr worsening today 1.9 
- secondary to sepsis 
- started on IVF  
- avoid nephrotoxins - will monitor renal function Diabetes mellitus, type II: 
-Hold long-acting, continue SSI. A1c 6.1 HTN - BP elevated likely due to pain. C/w hydralazine, Cardizem . IV hydralzine prn Depression: Home buspirone, Zoloft Code status: Full DVT prophylaxis: Lovenox Disposition: TBD. likey home with [facility] when ready  
 
---------------------------------------------- 
 
CC: Septic right sternoclavicular joint S: Patient is seen and examined at bedside this morning. She still has pain but controlled. Otherwise no other symptoms. Plan for OR on Monday. Still on dilaudid PCA pump Review of Systems: A comprehensive review of systems was negative. O: 
Visit Vitals /63 (BP 1 Location: Left arm, BP Patient Position: At rest) Pulse (!) 107 Temp 99.9 °F (37.7 °C) Resp 30 Ht 5' 10\" (1.778 m) Wt 102.5 kg (225 lb 15.5 oz) SpO2 97% BMI 32.42 kg/m² PHYSICAL EXAM: 
Gen: mild distress due to pain HEENT: anicteric sclerae, normal conjunctiva, oropharynx clear, MM moist 
Neck: supple, trachea midline, no adenopathy Heart: Tachy, irregularly irregular , no MRG, no JVD, no peripheral edema Lungs: CTA b/l, non-labored respirations. +R Sternoclavicular wound vac Abd: soft, NT, ND, BS+, no organomegaly Extr: warm Skin: dry, no rash Neuro: CN II-XII grossly intact, normal speech, moves all extremities Psych: normal mood, appropriate affect Intake/Output Summary (Last 24 hours) at 8/22/2020 1159 Last data filed at 8/21/2020 2214 Gross per 24 hour Intake  Output 800 ml Net -800 ml Recent labs & imaging reviewed: 
Recent Results (from the past 24 hour(s)) GLUCOSE, POC Collection Time: 08/21/20  5:39 PM  
Result Value Ref Range Glucose (POC) 186 (H) 65 - 100 mg/dL Performed by Bishnu Causey, POC Collection Time: 08/21/20  9:07 PM  
Result Value Ref Range Glucose (POC) 229 (H) 65 - 100 mg/dL  Performed by Perry Chino PCT   
 METABOLIC PANEL, BASIC Collection Time: 08/22/20  4:26 AM  
Result Value Ref Range Sodium 139 136 - 145 mmol/L Potassium 4.2 3.5 - 5.1 mmol/L Chloride 111 (H) 97 - 108 mmol/L  
 CO2 22 21 - 32 mmol/L Anion gap 6 5 - 15 mmol/L Glucose 99 65 - 100 mg/dL BUN 30 (H) 6 - 20 MG/DL Creatinine 1.90 (H) 0.55 - 1.02 MG/DL  
 BUN/Creatinine ratio 16 12 - 20 GFR est AA 33 (L) >60 ml/min/1.73m2 GFR est non-AA 27 (L) >60 ml/min/1.73m2 Calcium 8.9 8.5 - 10.1 MG/DL  
CBC WITH AUTOMATED DIFF Collection Time: 08/22/20  4:26 AM  
Result Value Ref Range WBC 7.7 3.6 - 11.0 K/uL  
 RBC 3.30 (L) 3.80 - 5.20 M/uL HGB 9.6 (L) 11.5 - 16.0 g/dL HCT 32.3 (L) 35.0 - 47.0 % MCV 97.9 80.0 - 99.0 FL  
 MCH 29.1 26.0 - 34.0 PG  
 MCHC 29.7 (L) 30.0 - 36.5 g/dL  
 RDW 16.4 (H) 11.5 - 14.5 % PLATELET 754 (H) 984 - 400 K/uL MPV 11.3 8.9 - 12.9 FL  
 NRBC 0.0 0  WBC ABSOLUTE NRBC 0.00 0.00 - 0.01 K/uL NEUTROPHILS 67 32 - 75 % LYMPHOCYTES 20 12 - 49 % MONOCYTES 9 5 - 13 % EOSINOPHILS 2 0 - 7 % BASOPHILS 1 0 - 1 % IMMATURE GRANULOCYTES 1 (H) 0.0 - 0.5 % ABS. NEUTROPHILS 5.2 1.8 - 8.0 K/UL  
 ABS. LYMPHOCYTES 1.6 0.8 - 3.5 K/UL  
 ABS. MONOCYTES 0.7 0.0 - 1.0 K/UL  
 ABS. EOSINOPHILS 0.1 0.0 - 0.4 K/UL  
 ABS. BASOPHILS 0.1 0.0 - 0.1 K/UL  
 ABS. IMM. GRANS. 0.1 (H) 0.00 - 0.04 K/UL  
 DF AUTOMATED    
GLUCOSE, POC Collection Time: 08/22/20  8:01 AM  
Result Value Ref Range Glucose (POC) 107 (H) 65 - 100 mg/dL Performed by Wendy Mora Recent Labs  
  08/22/20 0426 08/21/20 0515 WBC 7.7 11.1* HGB 9.6* 9.6* HCT 32.3* 31.3*  
* 397 Recent Labs  
  08/22/20 0426 08/21/20 0515 08/20/20 
1017  138 142  
K 4.2 4.2 3.5 * 110* 113* CO2 22 21 22 BUN 30* 28* 26* CREA 1.90* 1.79* 1.93* GLU 99 124* 146* CA 8.9 8.4* 8.9 MG  --  2.3  --   
PHOS  --  3.9  --   
 
Recent Labs 08/19/20 
1757 ALT 12  
* TBILI 0.7 TP 8.2 ALB 2.8*  
GLOB 5.4* Recent Labs 08/19/20 
1847 INR 1.1 PTP 11.4* No results for input(s): FE, TIBC, PSAT, FERR in the last 72 hours. Lab Results Component Value Date/Time Folate 8.5 03/14/2020 02:49 PM  
  
No results for input(s): PH, PCO2, PO2 in the last 72 hours. Recent Labs 08/19/20 
1757 TROIQ <0.05 Lab Results Component Value Date/Time Cholesterol, total 141 11/11/2019 08:47 AM  
 HDL Cholesterol 32 (L) 11/11/2019 08:47 AM  
 LDL, calculated 82 11/11/2019 08:47 AM  
 Triglyceride 137 11/11/2019 08:47 AM  
 
Lab Results Component Value Date/Time Glucose (POC) 107 (H) 08/22/2020 08:01 AM  
 Glucose (POC) 229 (H) 08/21/2020 09:07 PM  
 Glucose (POC) 186 (H) 08/21/2020 05:39 PM  
 Glucose (POC) 135 (H) 08/21/2020 11:36 AM  
 Glucose (POC) 131 (H) 08/21/2020 09:17 AM  
 
Lab Results Component Value Date/Time Color YELLOW/STRAW 05/23/2020 02:35 PM  
 Appearance CLOUDY (A) 05/23/2020 02:35 PM  
 Specific gravity 1.009 05/23/2020 02:35 PM  
 pH (UA) 6.0 05/23/2020 02:35 PM  
 Protein Negative 05/23/2020 02:35 PM  
 Glucose Negative 05/23/2020 02:35 PM  
 Ketone Negative 05/23/2020 02:35 PM  
 Bilirubin Negative 05/23/2020 02:35 PM  
 Urobilinogen 0.2 05/23/2020 02:35 PM  
 Nitrites Positive (A) 05/23/2020 02:35 PM  
 Leukocyte Esterase LARGE (A) 05/23/2020 02:35 PM  
 Epithelial cells FEW 05/23/2020 02:35 PM  
 Bacteria 4+ (A) 05/23/2020 02:35 PM  
 WBC >100 (H) 05/23/2020 02:35 PM  
 RBC 0-5 05/23/2020 02:35 PM  
 
 
Med list reviewed Current Facility-Administered Medications Medication Dose Route Frequency  0.9% sodium chloride infusion  20 mL/hr IntraVENous CONTINUOUS  
 [START ON 8/23/2020] Vancomycin Trough - Please draw level IMMEDIATELY PRIOR to the dose on 8/23 @ 0000, thanks! Other ONCE  hydrALAZINE (APRESOLINE) 20 mg/mL injection 20 mg  20 mg IntraVENous Q6H PRN  
  enoxaparin (LOVENOX) injection 100 mg  1 mg/kg SubCUTAneous Q12H  hydrALAZINE (APRESOLINE) tablet 100 mg  100 mg Oral TID  meropenem (MERREM) 500 mg in 0.9% sodium chloride (MBP/ADV) 50 mL  500 mg IntraVENous Q8H  
 busPIRone (BUSPAR) tablet 10 mg  10 mg Oral BID  dilTIAZem ER (CARDIZEM CD) capsule 180 mg  180 mg Oral DAILY  sertraline (ZOLOFT) tablet 75 mg  75 mg Oral DAILY  sodium chloride (NS) flush 5-40 mL  5-40 mL IntraVENous Q8H  
 sodium chloride (NS) flush 5-40 mL  5-40 mL IntraVENous PRN  
 acetaminophen (TYLENOL) tablet 650 mg  650 mg Oral Q6H PRN Or  
 acetaminophen (TYLENOL) suppository 650 mg  650 mg Rectal Q6H PRN  polyethylene glycol (MIRALAX) packet 17 g  17 g Oral DAILY PRN  promethazine (PHENERGAN) tablet 12.5 mg  12.5 mg Oral Q6H PRN Or  
 ondansetron (ZOFRAN) injection 4 mg  4 mg IntraVENous Q6H PRN  
 HYDROmorphone (PF) 25 mg/50 mL (DILAUDID) PCA   IntraVENous CONTINUOUS  Vancomycin - pharmacy to dose   Other Rx Dosing/Monitoring  vancomycin (VANCOCIN) 1250 mg in  ml infusion  1,250 mg IntraVENous Q24H  
 glucose chewable tablet 16 g  4 Tab Oral PRN  
 glucagon (GLUCAGEN) injection 1 mg  1 mg IntraMUSCular PRN  
 dextrose 10% infusion 0-250 mL  0-250 mL IntraVENous PRN  
 gabapentin (NEURONTIN) capsule 100 mg  100 mg Oral TID PRN  
 insulin regular (NOVOLIN R, HUMULIN R) injection   SubCUTAneous AC&HS Care Plan discussed with:  Patient/Family and Nurse Opal Flynn MD 
Internal Medicine Date of Service: 8/22/2020

## 2020-08-22 NOTE — PROGRESS NOTES
Verbal shift change report given to NSTU, RN (oncoming nurse) by Argentina Talavera RN (offgoing nurse). Report included the following information SBAR, Kardex, ED Summary, Intake/Output, MAR, Recent Results, Med Rec Status and Cardiac Rhythm Afib.

## 2020-08-22 NOTE — PROGRESS NOTES
[facility] [address] 
____________________________________________________________ Admit Date: 2020 POD/HD 2 Days Post-Op Procedure:  Procedure(s): 
INCISION AND DEBRIDEMENT RIGHT STERNOCLAVICULAR JOINT RESECTION OF MIDDLE THIRD OF RIGHT CLAVICLE AND PLACEMENT OF WOUND VAC Subjective:  
 
Patient has no new complaints. Objective:  
 
Blood pressure 153/63, pulse (!) 107, temperature 99.9 °F (37.7 °C), resp. rate 30, height 5' 10\" (1.778 m), weight 102.5 kg (225 lb 15.5 oz), SpO2 97 %. Temp (24hrs), Av.9 °F (37.2 °C), Min:97.8 °F (36.6 °C), Max:100 °F (37.8 °C) Physical Exam:  GENERAL: alert, cooperative, no distress, appears stated age, LUNG: clear to auscultation bilaterally, HEART: regular rate and rhythm, S1, S2 normal, no murmur, click, rub or gallop Incision:clean, dry and intact Labs:  
Recent Results (from the past 24 hour(s)) GLUCOSE, POC Collection Time: 20  5:39 PM  
Result Value Ref Range Glucose (POC) 186 (H) 65 - 100 mg/dL Performed by Veronica Freeman, POC Collection Time: 20  9:07 PM  
Result Value Ref Range Glucose (POC) 229 (H) 65 - 100 mg/dL Performed by Junior Alexandra PCT METABOLIC PANEL, BASIC Collection Time: 20  4:26 AM  
Result Value Ref Range Sodium 139 136 - 145 mmol/L Potassium 4.2 3.5 - 5.1 mmol/L Chloride 111 (H) 97 - 108 mmol/L  
 CO2 22 21 - 32 mmol/L Anion gap 6 5 - 15 mmol/L Glucose 99 65 - 100 mg/dL BUN 30 (H) 6 - 20 MG/DL Creatinine 1.90 (H) 0.55 - 1.02 MG/DL  
 BUN/Creatinine ratio 16 12 - 20 GFR est AA 33 (L) >60 ml/min/1.73m2 GFR est non-AA 27 (L) >60 ml/min/1.73m2 Calcium 8.9 8.5 - 10.1 MG/DL  
CBC WITH AUTOMATED DIFF Collection Time: 20  4:26 AM  
Result Value Ref Range WBC 7.7 3.6 - 11.0 K/uL  
 RBC 3.30 (L) 3.80 - 5.20 M/uL HGB 9.6 (L) 11.5 - 16.0 g/dL HCT 32.3 (L) 35.0 - 47.0 % MCV 97.9 80.0 - 99.0 FL  
 MCH 29.1 26.0 - 34.0 PG  
 MCHC 29.7 (L) 30.0 - 36.5 g/dL  
 RDW 16.4 (H) 11.5 - 14.5 % PLATELET 164 (H) 869 - 400 K/uL MPV 11.3 8.9 - 12.9 FL  
 NRBC 0.0 0  WBC ABSOLUTE NRBC 0.00 0.00 - 0.01 K/uL NEUTROPHILS 67 32 - 75 % LYMPHOCYTES 20 12 - 49 % MONOCYTES 9 5 - 13 % EOSINOPHILS 2 0 - 7 % BASOPHILS 1 0 - 1 % IMMATURE GRANULOCYTES 1 (H) 0.0 - 0.5 % ABS. NEUTROPHILS 5.2 1.8 - 8.0 K/UL  
 ABS. LYMPHOCYTES 1.6 0.8 - 3.5 K/UL  
 ABS. MONOCYTES 0.7 0.0 - 1.0 K/UL  
 ABS. EOSINOPHILS 0.1 0.0 - 0.4 K/UL  
 ABS. BASOPHILS 0.1 0.0 - 0.1 K/UL  
 ABS. IMM. GRANS. 0.1 (H) 0.00 - 0.04 K/UL  
 DF AUTOMATED    
GLUCOSE, POC Collection Time: 08/22/20  8:01 AM  
Result Value Ref Range Glucose (POC) 107 (H) 65 - 100 mg/dL Performed by Luz Styles Data Review images and reports reviewed Assessment:  
 
Active Problems: 
  Sepsis ([facility] [address] (8/20/2020) Disorder of sternoclavicular joint (8/21/2020) Plan/Recommendations/Medical Decision Making:  
 
Plan for OR on Monday for wound exploration and wound closure Thank you for allowing us to participate in the care of your patient.  
 
Leny Garcia MD

## 2020-08-23 NOTE — PROGRESS NOTES
Occupational Therapy Chart reviewed. Pt cleared for therapy by nursing. Pt greeted lying in bed. Pt politely declined therapy, reporting she had already participated once that day and was feeling fatigued and in pain. Reported she's \"not one to usually decline therapy\", and \"will be ready to go\" after her scheduled surgery tomorrow. OT will follow up for evaluation tomorrow as able. Thank you Merritt Becerril

## 2020-08-23 NOTE — PROGRESS NOTES
Day #4 of Vancomycin Indication:  suspected infected sternoclavicular joint; recent complicated hospitalization with MSSA bacteremia/endocarditis; s/p Incision and debridement of right sternoclavicular joint; resection of medial third of right clavicle; placement of wound VAC. Current regimen:  HELD -- now dosing by levels Abx regimen:  Vanc monotherapy (Meropenem d/c'd by ID -) ID Following ?: YES Concomitant nephrotoxic drugs (requires more frequent monitoring): None Frequency of BMP?: once on  Recent Labs  
  20 
0023 20 
0426 20 
0515 WBC 6.0 7.7 11.1*  
CREA 1.84* 1.90* 1.79* BUN 27* 30* 28* Est CrCl: ~43 ml/min Temp (24hrs), Av.7 °F (37.6 °C), Min:99.4 °F (37.4 °C), Max:99.9 °F (37.7 °C) Cultures:  
 Blood-NGTD 
 Wound (Right sternoclavicular joint) x2 - heavy MSSA, final 
 
Goal trough = ~15 mcg/mL (if MSSA, target 10-15 mcg/mL) Recent trough history (date/time/level/dose/action taken): 
 @ 0023 = 24 mcg/mL (~25 hours post-dose on 1250mg Q24h), extrapolated \"true trough\" = 24.5 mcg/mL, dose given but additional orders removed Plan: Further doses held due to supratherapeutic level overnight. Of note, 1250mg dose was administered prior to discontinuation of the standing vancomycin order. Will not dose again. Per ID note, it appears that plan is to transition patient to oral therapy but they are confirming allergies first. PLEASE consider antibiotic de-escalation as MSSA. Caio Gaming, PharmD, BCPP, BCPS Clinical Pharmacy Specialist, [facility]

## 2020-08-23 NOTE — PROGRESS NOTES
[facility] [address] 
____________________________________________________________ Admit Date: 2020 POD/HD 3 Days Post-Op Procedure:  Procedure(s): 
INCISION AND DEBRIDEMENT RIGHT STERNOCLAVICULAR JOINT RESECTION OF MIDDLE THIRD OF RIGHT CLAVICLE AND PLACEMENT OF WOUND VAC Subjective:  
 
Patient has no new complaints. Objective:  
 
Blood pressure 166/73, pulse (!) 110, temperature 99.5 °F (37.5 °C), resp. rate 16, height 5' 10\" (1.778 m), weight 109.5 kg (241 lb 6.5 oz), SpO2 97 %. Temp (24hrs), Av.7 °F (37.6 °C), Min:99.4 °F (37.4 °C), Max:99.9 °F (37.7 °C) Date 20 - 20 6080 Shift [id number] 7959-1741 1818-3274 24 Hour Total  
INTAKE Shift Total(mL/kg) OUTPUT Urine(mL/kg/hr) 650   650 Shift Total(mL/kg) 650(5.9)   650(5.9) Weight (kg) 109.5 109.5 109.5 109.5 Date 20 07 - 20 9550 Shift [id number] 4853-0609 8862-5530 24 Hour Total  
INTAKE Shift Total(mL/kg) OUTPUT Urine(mL/kg/hr) 650   650 Shift Total(mL/kg) 650(5.9)   650(5.9) Weight (kg) 109.5 109.5 109.5 109.5 Physical Exam:  GENERAL: alert, cooperative, no distress, appears stated age, LUNG: clear to auscultation bilaterally, HEART: regular rate and rhythm, S1, S2 normal, no murmur, click, rub or gallop Incision:wound VAC in place Labs:  
Recent Results (from the past 24 hour(s)) GLUCOSE, POC Collection Time: 20 12:24 PM  
Result Value Ref Range Glucose (POC) 151 (H) 65 - 100 mg/dL Performed by Dina Hernandez GLUCOSE, POC Collection Time: 20  5:42 PM  
Result Value Ref Range Glucose (POC) 128 (H) 65 - 100 mg/dL Performed by Dina Hernandez GLUCOSE, POC Collection Time: 20  9:05 PM  
Result Value Ref Range Glucose (POC) 128 (H) 65 - 100 mg/dL Performed by Pierce Hawkins (CON) Collection Time: 20 12:23 AM  
Result Value Ref Range Vancomycin,trough 24.0 (HH) 5.0 - 10.0 ug/mL Reported dose date: NOT PROVIDED Reported dose time: NOT PROVIDED Reported dose: NOT PROVIDED UNITS  
CBC WITH AUTOMATED DIFF Collection Time: 08/23/20 12:23 AM  
Result Value Ref Range WBC 6.0 3.6 - 11.0 K/uL  
 RBC 3.20 (L) 3.80 - 5.20 M/uL HGB 9.3 (L) 11.5 - 16.0 g/dL HCT 30.9 (L) 35.0 - 47.0 % MCV 96.6 80.0 - 99.0 FL  
 MCH 29.1 26.0 - 34.0 PG  
 MCHC 30.1 30.0 - 36.5 g/dL  
 RDW 16.2 (H) 11.5 - 14.5 % PLATELET 441 (H) 842 - 400 K/uL MPV 10.4 8.9 - 12.9 FL  
 NRBC 0.0 0  WBC ABSOLUTE NRBC 0.00 0.00 - 0.01 K/uL NEUTROPHILS 60 32 - 75 % LYMPHOCYTES 23 12 - 49 % MONOCYTES 11 5 - 13 % EOSINOPHILS 4 0 - 7 % BASOPHILS 1 0 - 1 % IMMATURE GRANULOCYTES 1 (H) 0.0 - 0.5 % ABS. NEUTROPHILS 3.6 1.8 - 8.0 K/UL  
 ABS. LYMPHOCYTES 1.4 0.8 - 3.5 K/UL  
 ABS. MONOCYTES 0.7 0.0 - 1.0 K/UL  
 ABS. EOSINOPHILS 0.2 0.0 - 0.4 K/UL  
 ABS. BASOPHILS 0.1 0.0 - 0.1 K/UL  
 ABS. IMM. GRANS. 0.1 (H) 0.00 - 0.04 K/UL  
 DF AUTOMATED METABOLIC PANEL, BASIC Collection Time: 08/23/20 12:23 AM  
Result Value Ref Range Sodium 138 136 - 145 mmol/L Potassium 4.1 3.5 - 5.1 mmol/L Chloride 110 (H) 97 - 108 mmol/L  
 CO2 22 21 - 32 mmol/L Anion gap 6 5 - 15 mmol/L Glucose 106 (H) 65 - 100 mg/dL BUN 27 (H) 6 - 20 MG/DL Creatinine 1.84 (H) 0.55 - 1.02 MG/DL  
 BUN/Creatinine ratio 15 12 - 20 GFR est AA 34 (L) >60 ml/min/1.73m2 GFR est non-AA 28 (L) >60 ml/min/1.73m2 Calcium 8.5 8.5 - 10.1 MG/DL  
GLUCOSE, POC Collection Time: 08/23/20  6:32 AM  
Result Value Ref Range Glucose (POC) 100 65 - 100 mg/dL Performed by Jesus Almeida (CON) Data Review images and reports reviewed Assessment:  
 
Active Problems: 
  Sepsis ([address] (8/20/2020) Disorder of sternoclavicular joint (8/21/2020) Plan/Recommendations/Medical Decision Making: To OR tomorrow afternoon For debridment and possible wound closure Make NPO past midnight HOld lovenox for now Cont ABX Thank you for allowing us to participate in the care of your patient.  
 
Veronika Martínez MD

## 2020-08-23 NOTE — PROGRESS NOTES
Pharmacist Note - Vancomycin Dosing Therapy day 4 Indication: suspected infected sternoclavicular joint; recent complicated hospitalization with MSSA bacteremia/endocarditis; s/p Incision and debridement of right sternoclavicular joint; resection of medial third of right clavicle; placement of wound VAC Current regimen: 1250 mg q24h A Trough Level resulted at 24 mcg/mL which was obtained 25 hrs post-dose. The extrapolated \"true\" trough is approximately 24.5 mcg/mL based on the patient's known kinetics. Goal trough: 15 - 20 mcg/mL Plan: Change to hold dose until trough is approximately 15. Pharmacy will continue to monitor this patient daily for changes in clinical status and renal function.

## 2020-08-23 NOTE — PROGRESS NOTES
Hospitalist Progress Note Hospital summary: 60year old female with past medical history HTN, DM Type II on insulin, Atrial fibrillation/flutter, CKD stage III, recent admission 4/19/2020-5/13/2020 for left foot cellulitis/OM s/p left BKA, bacteremia MSSA, endocarditis, Pacemaker vegetation s/p lead extraction, presenting to [facility] as a direct transfer from [facility] emergency department for thoracic surgery evaluation. Patient developed right-sided chest pain/tenderness radiating to right shoulder. Seen at urgent care and found to have atrial fibrillation with RVR. Sent to the emergency department for further evaluation. In the ED, CT chest demonstrated septic arthritis of the right sternal clavicular joint and no abscess. Given Merrem and vancomycin and transferred to [facility] 8/20/2020 Assessment/Plan: 
Sepsis secondary to septic right sternoclavicular joint s/p I&D on 8/20  
- tachycardia, leukocytosis on poa  
-Recent complicated hospitalization with MSSA bacteremia/endocarditis - Thoracic surgery on board - Incision and debridement of right sternoclavicular joint. Resection of medial third of right clavicle. Placement of wound VAC on 8/20 
- leucocytosis - resolved - blood cultures: NGTD 
- OR cx: staph species - prelim - ID consult pending , will reconsult tomorrow -c/w Broad-spectrum IV antibiotics: Meropenem, vancomycin (penicillin allergy) - pain meds - on Dilaudid PCA pump , need to transition to po meds and IV meds prn on Monday after OR 
-  Plan for OR on Mon 8/24/2020 for vac change 
  
Atrial fibrillation with RVR: 
- off Cardizem drip, c/w oral diltiazem 
- HR still in 100's - 8/21: discussed with thoracic surgery Dr. Arjun Owen: ok with Lovenox full dose for now, coumadin can be restarted after Monday surgery Off lovenox PARAG on CKD stage III - improving  
- secondary to sepsis 
- started on IVF - avoid nephrotoxins - will monitor renal function Diabetes mellitus, type II: 
-Hold long-acting, continue SSI. A1c 6.1 HTN  
- BP elevated likely due to pain. C/w hydralazine, Cardizem . IV hydralzine prn Depression:  
Home buspirone, Zoloft Code status: Full DVT prophylaxis: Lovenox Disposition: TBD. likey home with [place] when ready  
 
---------------------------------------------- 
 
CC: Septic right sternoclavicular joint S: Patient is seen and examined at bedside this morning. She still has pain but controlled. Otherwise no other symptoms. Plan for OR on Monday. Still on dilaudid PCA pump Review of Systems: A comprehensive review of systems was negative. O: 
Visit Vitals /55 (BP 1 Location: Left arm, BP Patient Position: At rest) Pulse 100 Temp 99.3 °F (37.4 °C) Resp 14 Ht 5' 10\" (1.778 m) Wt 109.5 kg (241 lb 6.5 oz) SpO2 97% BMI 34.64 kg/m² PHYSICAL EXAM: 
Gen: mild distress due to pain HEENT: anicteric sclerae, normal conjunctiva, oropharynx clear, MM moist 
Neck: supple, trachea midline, no adenopathy Heart: Tachy, irregularly irregular , no MRG, no JVD, no peripheral edema Lungs: CTA b/l, non-labored respirations. +R Sternoclavicular wound vac Abd: soft, NT, ND, BS+, no organomegaly Extr: warm Skin: dry, no rash Neuro: CN II-XII grossly intact, normal speech, moves all extremities Psych: normal mood, appropriate affect Intake/Output Summary (Last 24 hours) at 8/23/2020 1625 Last data filed at 8/23/2020 0730 Gross per 24 hour Intake  Output 650 ml Net -650 ml Recent labs & imaging reviewed: 
Recent Results (from the past 24 hour(s)) GLUCOSE, POC Collection Time: 08/22/20  5:42 PM  
Result Value Ref Range Glucose (POC) 128 (H) 65 - 100 mg/dL Performed by Jacy Platt GLUCOSE, POC Collection Time: 08/22/20  9:05 PM  
Result Value Ref Range Glucose (POC) 128 (H) 65 - 100 mg/dL Performed by Padmini Lasron (DESHAUN) Reza Roman Collection Time: 08/23/20 12:23 AM  
Result Value Ref Range Vancomycin,trough 24.0 (HH) 5.0 - 10.0 ug/mL Reported dose date: NOT PROVIDED Reported dose time: NOT PROVIDED Reported dose: NOT PROVIDED UNITS  
CBC WITH AUTOMATED DIFF Collection Time: 08/23/20 12:23 AM  
Result Value Ref Range WBC 6.0 3.6 - 11.0 K/uL  
 RBC 3.20 (L) 3.80 - 5.20 M/uL HGB 9.3 (L) 11.5 - 16.0 g/dL HCT 30.9 (L) 35.0 - 47.0 % MCV 96.6 80.0 - 99.0 FL  
 MCH 29.1 26.0 - 34.0 PG  
 MCHC 30.1 30.0 - 36.5 g/dL  
 RDW 16.2 (H) 11.5 - 14.5 % PLATELET 931 (H) 513 - 400 K/uL MPV 10.4 8.9 - 12.9 FL  
 NRBC 0.0 0  WBC ABSOLUTE NRBC 0.00 0.00 - 0.01 K/uL NEUTROPHILS 60 32 - 75 % LYMPHOCYTES 23 12 - 49 % MONOCYTES 11 5 - 13 % EOSINOPHILS 4 0 - 7 % BASOPHILS 1 0 - 1 % IMMATURE GRANULOCYTES 1 (H) 0.0 - 0.5 % ABS. NEUTROPHILS 3.6 1.8 - 8.0 K/UL  
 ABS. LYMPHOCYTES 1.4 0.8 - 3.5 K/UL  
 ABS. MONOCYTES 0.7 0.0 - 1.0 K/UL  
 ABS. EOSINOPHILS 0.2 0.0 - 0.4 K/UL  
 ABS. BASOPHILS 0.1 0.0 - 0.1 K/UL  
 ABS. IMM. GRANS. 0.1 (H) 0.00 - 0.04 K/UL  
 DF AUTOMATED METABOLIC PANEL, BASIC Collection Time: 08/23/20 12:23 AM  
Result Value Ref Range Sodium 138 136 - 145 mmol/L Potassium 4.1 3.5 - 5.1 mmol/L Chloride 110 (H) 97 - 108 mmol/L  
 CO2 22 21 - 32 mmol/L Anion gap 6 5 - 15 mmol/L Glucose 106 (H) 65 - 100 mg/dL BUN 27 (H) 6 - 20 MG/DL Creatinine 1.84 (H) 0.55 - 1.02 MG/DL  
 BUN/Creatinine ratio 15 12 - 20 GFR est AA 34 (L) >60 ml/min/1.73m2 GFR est non-AA 28 (L) >60 ml/min/1.73m2 Calcium 8.5 8.5 - 10.1 MG/DL  
GLUCOSE, POC Collection Time: 08/23/20  6:32 AM  
Result Value Ref Range Glucose (POC) 100 65 - 100 mg/dL Performed by Padmini Larson (CON) GLUCOSE, POC Collection Time: 08/23/20 12:21 PM  
Result Value Ref Range Glucose (POC) 123 (H) 65 - 100 mg/dL Performed by Domingo Alva Recent Labs  
  08/23/20 
0023 08/22/20 
6805 WBC 6.0 7.7 HGB 9.3* 9.6* HCT 30.9* 32.3*  
* 457* Recent Labs  
  08/23/20 
0023 08/22/20 
0426 08/21/20 
0515  139 138  
K 4.1 4.2 4.2 * 111* 110* CO2 22 22 21 BUN 27* 30* 28* CREA 1.84* 1.90* 1.79* * 99 124* CA 8.5 8.9 8.4* MG  --   --  2.3 PHOS  --   --  3.9 No results for input(s): ALT, AP, TBIL, TBILI, TP, ALB, GLOB, GGT, AML, LPSE in the last 72 hours. No lab exists for component: SGOT, GPT, AMYP, HLPSE No results for input(s): INR, PTP, APTT, INREXT, INREXT in the last 72 hours. No results for input(s): FE, TIBC, PSAT, FERR in the last 72 hours. Lab Results Component Value Date/Time Folate 8.5 03/14/2020 02:49 PM  
  
No results for input(s): PH, PCO2, PO2 in the last 72 hours. No results for input(s): CPK, CKNDX, TROIQ in the last 72 hours. No lab exists for component: CPKMB Lab Results Component Value Date/Time Cholesterol, total 141 11/11/2019 08:47 AM  
 HDL Cholesterol 32 (L) 11/11/2019 08:47 AM  
 LDL, calculated 82 11/11/2019 08:47 AM  
 Triglyceride 137 11/11/2019 08:47 AM  
 
Lab Results Component Value Date/Time Glucose (POC) 123 (H) 08/23/2020 12:21 PM  
 Glucose (POC) 100 08/23/2020 06:32 AM  
 Glucose (POC) 128 (H) 08/22/2020 09:05 PM  
 Glucose (POC) 128 (H) 08/22/2020 05:42 PM  
 Glucose (POC) 151 (H) 08/22/2020 12:24 PM  
 
Lab Results Component Value Date/Time  Color YELLOW/STRAW 05/23/2020 02:35 PM  
 Appearance CLOUDY (A) 05/23/2020 02:35 PM  
 Specific gravity 1.009 05/23/2020 02:35 PM  
 pH (UA) 6.0 05/23/2020 02:35 PM  
 Protein Negative 05/23/2020 02:35 PM  
 Glucose Negative 05/23/2020 02:35 PM  
 Ketone Negative 05/23/2020 02:35 PM  
 Bilirubin Negative 05/23/2020 02:35 PM  
 Urobilinogen 0.2 05/23/2020 02:35 PM  
 Nitrites Positive (A) 05/23/2020 02:35 PM  
 Leukocyte Esterase LARGE (A) 05/23/2020 02:35 PM  
 Epithelial cells FEW 05/23/2020 02:35 PM  
 Bacteria 4+ (A) 05/23/2020 02:35 PM  
 WBC >100 (H) 05/23/2020 02:35 PM  
 RBC 0-5 05/23/2020 02:35 PM  
 
 
Med list reviewed Current Facility-Administered Medications Medication Dose Route Frequency  0.9% sodium chloride infusion  20 mL/hr IntraVENous CONTINUOUS  
 hydrALAZINE (APRESOLINE) 20 mg/mL injection 20 mg  20 mg IntraVENous Q6H PRN  
 hydrALAZINE (APRESOLINE) tablet 100 mg  100 mg Oral TID  busPIRone (BUSPAR) tablet 10 mg  10 mg Oral BID  dilTIAZem ER (CARDIZEM CD) capsule 180 mg  180 mg Oral DAILY  sertraline (ZOLOFT) tablet 75 mg  75 mg Oral DAILY  sodium chloride (NS) flush 5-40 mL  5-40 mL IntraVENous Q8H  
 sodium chloride (NS) flush 5-40 mL  5-40 mL IntraVENous PRN  
 acetaminophen (TYLENOL) tablet 650 mg  650 mg Oral Q6H PRN Or  
 acetaminophen (TYLENOL) suppository 650 mg  650 mg Rectal Q6H PRN  polyethylene glycol (MIRALAX) packet 17 g  17 g Oral DAILY PRN  promethazine (PHENERGAN) tablet 12.5 mg  12.5 mg Oral Q6H PRN Or  
 ondansetron (ZOFRAN) injection 4 mg  4 mg IntraVENous Q6H PRN  
 HYDROmorphone (PF) 25 mg/50 mL (DILAUDID) PCA   IntraVENous CONTINUOUS  Vancomycin - pharmacy to dose   Other Rx Dosing/Monitoring  glucose chewable tablet 16 g  4 Tab Oral PRN  
 glucagon (GLUCAGEN) injection 1 mg  1 mg IntraMUSCular PRN  
 dextrose 10% infusion 0-250 mL  0-250 mL IntraVENous PRN  
 gabapentin (NEURONTIN) capsule 100 mg  100 mg Oral TID PRN  
 insulin regular (NOVOLIN R, HUMULIN R) injection   SubCUTAneous AC&HS Care Plan discussed with:  Patient/Family and Nurse Ale Tristan MD 
Internal Medicine Date of Service: 8/23/2020

## 2020-08-23 NOTE — PROGRESS NOTES
Problem: Mobility Impaired (Adult and Pediatric) Goal: *Acute Goals and Plan of Care (Insert Text) Description: FUNCTIONAL STATUS PRIOR TO ADMISSION: Patient was modified independent using a rolling walker and and L BKA prosthesis for functional mobility. HOME SUPPORT PRIOR TO ADMISSION: The patient lived with family but did not require assist. 
 
Physical Therapy Goals Initiated 8/23/2020 1. Patient will move from supine to sit and sit to supine , scoot up and down, and roll side to side in bed with minimal assistance/contact guard assist within 7 day(s). 2.  Patient will transfer from bed to chair and chair to bed with minimal assistance/contact guard assist using the least restrictive device within 7 day(s). 3.  Patient will perform sit to stand with minimal assistance/contact guard assist within 7 day(s). 4.  Patient will ambulate with minimal assistance/contact guard assist for 10 feet with the least restrictive device within 7 day(s). Outcome: Progressing Towards Goal 
 PHYSICAL THERAPY EVALUATION Patient: Rajan Garcia (10 y.o. female) Date: 8/23/2020 Primary Diagnosis: Sepsis ([address] [A41.9] Procedure(s) (LRB): 
INCISION AND DEBRIDEMENT RIGHT STERNOCLAVICULAR JOINT RESECTION OF MIDDLE THIRD OF RIGHT CLAVICLE AND PLACEMENT OF WOUND VAC (Right) 3 Days Post-Op Precautions:   Fall, Contact(per thoracic, no restrictions aside from pain with RUE) ASSESSMENT Based on the objective data described below, the patient presents with decreased mobility and increased pain compared to baseline after being admitted with R SC joint septic arthritis. She underwent resection of a portion of her clavicle with wound vac placement and plan is for replacement of wound vac in OR tomorrow. In addition, she had a L BKA in May and has history of a fib with RVR, breast CA with lymphedema, DM and obesity.   When she was discharged in May to inpatient rehab, she was able to progress her mobility to the point of ambulating with her prosthesis and RW without assistance. At this time,  she is primarily limited by pain in the R shoulder with any movement of the RUE or trunk. She reports her pain is too severe to attempt sitting EOB, and even scooting up in the bed with assistance is very painful for her. She has limited motion available with assist in the R shoulder due to pain, as well. Her strength otherwise is good, though note her HR in the 110s with minimal activity in bed. Reviewed and performed lower body exercises to focus on hip girdle strength and maintaining LLE ROM and added theraband to her bedrails to offer some resistance for LUE exercise. Spoke with thoracic and he reports there are no activity restrictions for her R shoulder aside from pain. Expect that a sling would be helpful for progressing her bed mobility and sitting EOB or even in bed in chair position in next session. Unless her pain improves dramatically, expect that she will need some form of rehab once she is medically ready. Current Level of Function Impacting Discharge (mobility/balance): max assist for mobility in bed, unable to sit EOB Functional Outcome Measure: The patient scored Total: 15/100 on the Barthel Index which is indicative of sevrely impaired ability to care for basic self needs/dependency on others. Other factors to consider for discharge: needs to be able to use her RUE to be able to don her prosthesis and use walker for ambulation Patient will benefit from skilled therapy intervention to address the above noted impairments. PLAN : 
Recommendations and Planned Interventions: bed mobility training, transfer training, gait training, therapeutic exercises, patient and family training/education, and therapeutic activities Frequency/Duration: Patient will be followed by physical therapy:  5 times a week to address goals. Recommendation for discharge: (in order for the patient to meet his/her long term goals) To be determined: likely rehab, but pending progress with mobility and pain control This discharge recommendation: A follow-up discussion with the attending provider and/or case management is planned IF patient discharges home will need the following DME: to be determined (TBD) SUBJECTIVE:  
Patient stated I don't think I can do that at all right now.  OBJECTIVE DATA SUMMARY:  
HISTORY:   
Past Medical History:  
Diagnosis Date Acquired lymphedema Right arm Arrhythmia Asthma Atrial fibrillation ([address] Dr. John Sawyer and Dr. Ebonie Almanza  
 Atrial flutter [facility]) Cancer ([address] bilateral breast  
 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  
Procedure Laterality Date ABDOMEN SURGERY PROC UNLISTED    
 gastric bypass GASTRIC BYPASS,OBESE<150CM SAW-EN-Y  7/08  
 [facility]    
 HX BREAST RECONSTRUCTION Bilateral   
 HX CARPAL TUNNEL RELEASE    
 HX CERVICAL FUSION  1994 [address] [facility] [address] RIGHT MODIFIED RADICAL   
 HX MASTECTOMY Left   
 no lymphnode removal  
 HX MOHS PROCEDURES  1995  
 right HX ORTHOPAEDIC Right   
 knee surgery HX PACEMAKER    
 HX PACEMAKER    
 IR INSERT TUNL CVC W PORT OVER 5 YEARS    
 IR INSERT TUNL CVC W/O PORT OVER 5 YR  5/4/2020 IR REMOVE TUNL CVAD W/O PORT / PUMP  2020 [address] NEEDLE BIOPSY LIVER,W OTHR [address]  07  
 [place] [address] AND  [place] RELOCATION OF SKIN POCKET FOR PACEMAKER N/A 2020 RELOCATE [address] performed by Cristian Connolly MD at [facility] CARDIAC CATH LAB  
 TN RMVL TRANSVNS PM ELTRD 1 LEAD SYS ATR/VENTR N/A 2020 Remove Pacemaker Dual Leads performed by Cristian Connolly MD at [facility] CARDIAC CATH LAB  
 TN RMVL TRANSVNS PM ELTRD 1 LEAD SYS ATR/VENTR N/A 2020 REMOVE PACEMAKER DUAL LEADS performed by Cristian Connolly MD at hospitals CARDIAC CATH LAB  
 TN TRABECULOPLASTY BY LASER SURGERY    
 US GUIDE ASP OVARIAN CYST ABD APPROACH  07  
 RIGHT Personal factors and/or comorbidities impacting plan of care: as noted above EXAMINATION/PRESENTATION/DECISION MAKING:  
Critical Behavior: 
Neurologic State: Alert Orientation Level: Oriented X4 Hearing: Auditory Auditory Impairment: None Skin:  wound vac in place R clavicle area Edema: none Range Of Motion: 
AROM: Within functional limits(R shld limited by pain, minimal active mvmt) PROM: (R shld able to tolerate about 30 degrees elevation) Strength:   
Strength: Generally decreased, functional(RUE limited by pain 1/5, otherwise 4/4 throughout) Tone & Sensation:  
Tone: Normal 
  
  
  
  
Sensation: Intact Coordination: 
Coordination: Within functional limits Vision:  
  
Functional Mobility: 
Bed Mobility: 
Rolling: Maximum assistance Scooting: Maximum assistance(due to pain in R shoulder) Transfers: 
  
  
     
  
     
  
  
Balance:  
  
Ambulation/Gait Training: 
  
  
  
  
  
  
  
  
  
  
  
  
  
  
  
  
  
  
 Stairs: Therapeutic Exercises:  
Quad sets, glut sets,  hip abd/add, half bridge, shoulder and elbow motion with theraband Functional Measure: 
Barthel Index: 
 
Bathin Bladder: 0 Bowels: 10 
Groomin Dressin Feedin Mobility: 0 Stairs: 0 Toilet Use: 0 Transfer (Bed to Chair and Back): 0 Total: 15/100 The Barthel ADL Index: Guidelines 1. The index should be used as a record of what a patient does, not as a record of what a patient could do. 2. The main aim is to establish degree of independence from any help, physical or verbal, however minor and for whatever reason. 3. The need for supervision renders the patient not independent. 4. A patient's performance should be established using the best available evidence. Asking the patient, friends/relatives and nurses are the usual sources, but direct observation and common sense are also important. However direct testing is not needed. 5. Usually the patient's performance over the preceding 24-48 hours is important, but occasionally longer periods will be relevant. 6. Middle categories imply that the patient supplies over 50 per cent of the effort. 7. Use of aids to be independent is allowed. Mennie Barthel., Barthel, D.W. ([id number]). Functional evaluation: the Barthel Index. [address] [facility] (14)2. LOUIE KwongF, La Grady., Reina Wisew., [place], [address] (). Measuring the change indisability after inpatient rehabilitation; comparison of the responsiveness of the Barthel Index and Functional [place] Measure. Journal of Neurology, Neurosurgery, and Psychiatry, 66(4), 709-742. Rhonda Gardner, N.J.A, Andrea Saeed  W.J.M, & Glenna Wade, M.A. (2004.) Assessment of post-stroke quality of life in cost-effectiveness studies: The usefulness of the Barthel Index and the EuroQoL-5D. [facility], 13, [phone] Physical Therapy Evaluation Charge Determination History Examination Presentation Decision-Making HIGH Complexity :3+ comorbidities / personal factors will impact the outcome/ POC  MEDIUM Complexity : 3 Standardized tests and measures addressing body structure, function, activity limitation and / or participation in recreation  MEDIUM Complexity : Evolving with changing characteristics  MEDIUM Complexity : FOTO score of 26-74 Based on the above components, the patient evaluation is determined to be of the following complexity level: MEDIUM Pain Rating: 
Pain significant with motion of RUE or repositioning of the trunk, improved with pillow support under arm Activity Tolerance:  
Fair and requires frequent rest breaks Please refer to the flowsheet for vital signs taken during this treatment. After treatment patient left in no apparent distress:  
Supine in bed, Call bell within reach, and Side rails x 3 
 
COMMUNICATION/EDUCATION:  
The patients plan of care was discussed with: Registered nurse and Physician. Fall prevention education was provided and the patient/caregiver indicated understanding., Patient/family have participated as able in goal setting and plan of care. , and Patient/family agree to work toward stated goals and plan of care. Thank you for this referral. 
Sherly Darnell, PT Time Calculation: 28 mins

## 2020-08-23 NOTE — PROGRESS NOTES
Bedside and Verbal shift change report given to Marci Armendariz RN (oncoming nurse) by Maegan Coleman RN (offgoing nurse). Report included the following information SBAR, Kardex, Intake/Output, MAR, Recent Results and Cardiac Rhythm Afib.

## 2020-08-23 NOTE — PROGRESS NOTES
Problem: Falls - Risk of 
Goal: *Absence of Falls Description: Document Marybeth Mccormick Fall Risk and appropriate interventions in the flowsheet. Outcome: Progressing Towards Goal 
Note: Fall Risk Interventions: 
Mobility Interventions: Patient to call before getting OOB, PT Consult for mobility concerns Medication Interventions: Evaluate medications/consider consulting pharmacy, Patient to call before getting OOB Elimination Interventions: Call light in reach, Patient to call for help with toileting needs Problem: Pain Goal: *Control of Pain Outcome: Progressing Towards Goal 
  
Problem: Pressure Injury - Risk of 
Goal: *Prevention of pressure injury Description: Document Valdemar Scale and appropriate interventions in the flowsheet. Outcome: Progressing Towards Goal 
Note: Pressure Injury Interventions: 
Sensory Interventions: Assess need for specialty bed, Discuss PT/OT consult with provider Activity Interventions: Increase time out of bed, Pressure redistribution bed/mattress(bed type), PT/OT evaluation Mobility Interventions: HOB 30 degrees or less, Pressure redistribution bed/mattress (bed type), PT/OT evaluation Nutrition Interventions: Document food/fluid/supplement intake, Discuss nutritional consult with provider Friction and Shear Interventions: HOB 30 degrees or less, Lift sheet

## 2020-08-23 NOTE — PROGRESS NOTES
Bedside shift change report given to Audra Begr RN (oncoming nurse) by Levonne Kussmaul, RN (offgoing nurse). Report included the following information SBAR and Cardiac Rhythm A fib.

## 2020-08-24 NOTE — PERIOP NOTES
TRANSFER - OUT REPORT: 
 
Verbal report given to Ca(name) on iBrd Gaston  being transferred to [facility](unit) for routine post - op Report consisted of patients Situation, Background, Assessment and  
Recommendations(SBAR). Time Pre op antibiotic given:none Anesthesia Stop time: 1320 [place] Present on Transfer to floor:no Order for Munroe on Chart:no Discharge Prescriptions with Chart:no Information from the following report(s) Procedure Summary and Accordion was reviewed with the receiving nurse. Opportunity for questions and clarification was provided. Is the patient on 02? NO Is the patient on a monitor? YES Is the nurse transporting with the patient? YES Surgical Waiting Area notified of patient's transfer from PACU? NOpt did not want  to be called beccause she said that he wouldn't understand The following personal items collected during your admission accompanied patient upon transfer:  
Dental Appliance: Dental Appliances: None Vision: Visual Aid: None Hearing Aid:   
Jewelry: Jewelry: None Clothing: Clothing: At bedside Other Valuables: Other Valuables: Cell Phone, Suze Mota Valuables sent to safe:

## 2020-08-24 NOTE — ANESTHESIA PREPROCEDURE EVALUATION
Relevant Problems No relevant active problems Anesthetic History PONV Review of Systems / Medical History Patient summary reviewed, nursing notes reviewed and pertinent labs reviewed Pulmonary Asthma : well controlled Neuro/Psych Within defined limits Cardiovascular Within defined limits Hypertension Dysrhythmias : atrial fibrillation Exercise tolerance: <4 METS Comments: S/p in pacer excision sec inf SSS  
GI/Hepatic/Renal 
  
 
 
Renal disease: CRI Endo/Other Diabetes: type 2 Obesity and arthritis Other Findings Physical Exam 
 
Airway Mallampati: II 
TM Distance: > 6 cm Neck ROM: normal range of motion Mouth opening: Normal 
 
 Cardiovascular Rhythm: irregular Rate: abnormal 
 
Murmur: Grade 2, Mitral area Dental 
 
Dentition: Poor dentition and Upper partial plate Pulmonary Breath sounds clear to auscultation Abdominal 
GI exam deferred Other Findings Anesthetic Plan ASA: 3 Anesthesia type: general 
 
 
 
 
Induction: Intravenous Anesthetic plan and risks discussed with: Patient

## 2020-08-24 NOTE — PROGRESS NOTES
Hospitalist Progress Note Hospital summary: 60year old female with past medical history HTN, DM Type II on insulin, Atrial fibrillation/flutter, CKD stage III, recent admission 4/19/2020-5/13/2020 for left foot cellulitis/OM s/p left BKA, bacteremia MSSA, endocarditis, Pacemaker vegetation s/p lead extraction, presenting to [facility] as a direct transfer from [address] [place] [address] emergency department for thoracic surgery evaluation. Patient developed right-sided chest pain/tenderness radiating to right shoulder. Seen at urgent care and found to have atrial fibrillation with RVR. Sent to the emergency department for further evaluation. In the ED, CT chest demonstrated septic arthritis of the right sternal clavicular joint and no abscess. Given Merrem and vancomycin and transferred to [facility] 8/20/2020 Assessment/Plan: 
Sepsis secondary to septic right sternoclavicular joint s/p I&D on 8/20  
- tachycardia, leukocytosis on poa  
-Recent complicated hospitalization with MSSA bacteremia/endocarditis - Thoracic surgery on board - Incision and debridement of right sternoclavicular joint. Resection of medial third of right clavicle. Placement of wound VAC on 8/20 
- leucocytosis - resolved - blood cultures: NGTD 
- OR cx: MSSA 
- ID consult pending , will reconsult tomorrow -c/w Broad-spectrum IV antibiotics: Meropenem, vancomycin (penicillin allergy) - pain meds - on Dilaudid PCA pump , need to transition to po meds and IV meds prn on Monday after OR 
-  Plan for OR on Mon 8/24/2020 for vac change 
  
Atrial fibrillation with RVR: 
- off Cardizem drip, c/w oral diltiazem 
- HR still in 100's  
- coumadin will be restarted once cleared by thoracic surgery ? after today PARAG on CKD stage III - continues to improvwe 
- secondary to sepsis 
- on IVF  
- avoid nephrotoxins - will monitor renal function Diabetes mellitus, type II: 
 -Hold long-acting, continue SSI. A1c 6.1 HTN  
- BP elevated likely due to pain. C/w hydralazine, Cardizem . IV hydralzine prn Depression:  
Home buspirone, Zoloft Code status: Full DVT prophylaxis: Lovenox Disposition: TBD. likey home with [facility] when ready  
 
---------------------------------------------- 
 
CC: Septic right sternoclavicular joint S: Patient is seen and examined at bedside this morning. She still has pain but controlled. Otherwise no other symptoms. Plan for OR on Monday. Still on dilaudid PCA pump BP elevated Review of Systems: A comprehensive review of systems was negative. O: 
Visit Vitals /75 (BP 1 Location: Left arm, BP Patient Position: At rest) Pulse 95 Temp 99.6 °F (37.6 °C) Resp 12 Ht 5' 10\" (1.778 m) Wt 106.5 kg (234 lb 12.6 oz) SpO2 98% BMI 33.69 kg/m² PHYSICAL EXAM: 
Gen: mild distress due to pain HEENT: anicteric sclerae, normal conjunctiva, oropharynx clear, MM moist 
Neck: supple, trachea midline, no adenopathy Heart: Tachy, irregularly irregular , no MRG, no JVD, no peripheral edema Lungs: CTA b/l, non-labored respirations. +R Sternoclavicular wound vac Abd: soft, NT, ND, BS+, no organomegaly Extr: warm Skin: dry, no rash Neuro: CN II-XII grossly intact, normal speech, moves all extremities Psych: normal mood, appropriate affect Intake/Output Summary (Last 24 hours) at 8/24/2020 4950 Last data filed at 8/24/2020 5042 Gross per 24 hour Intake  Output 200 ml Net -200 ml Recent labs & imaging reviewed: 
Recent Results (from the past 24 hour(s)) GLUCOSE, POC Collection Time: 08/23/20 12:21 PM  
Result Value Ref Range Glucose (POC) 123 (H) 65 - 100 mg/dL Performed by Indy Crump GLUCOSE, POC Collection Time: 08/23/20  5:33 PM  
Result Value Ref Range Glucose (POC) 139 (H) 65 - 100 mg/dL Performed by Indy Crump GLUCOSE, POC  Collection Time: 08/23/20  9:24 PM  
 Result Value Ref Range Glucose (POC) 132 (H) 65 - 100 mg/dL Performed by Caryle Mai [facility]   
CBC WITH AUTOMATED DIFF Collection Time: 08/24/20  3:45 AM  
Result Value Ref Range WBC 5.9 3.6 - 11.0 K/uL  
 RBC 3.22 (L) 3.80 - 5.20 M/uL HGB 9.2 (L) 11.5 - 16.0 g/dL HCT 31.3 (L) 35.0 - 47.0 % MCV 97.2 80.0 - 99.0 FL  
 MCH 28.6 26.0 - 34.0 PG  
 MCHC 29.4 (L) 30.0 - 36.5 g/dL  
 RDW 15.9 (H) 11.5 - 14.5 % PLATELET 271 (H) 253 - 400 K/uL MPV 10.1 8.9 - 12.9 FL  
 NRBC 0.0 0  WBC ABSOLUTE NRBC 0.00 0.00 - 0.01 K/uL NEUTROPHILS 55 32 - 75 % LYMPHOCYTES 23 12 - 49 % MONOCYTES 14 (H) 5 - 13 % EOSINOPHILS 5 0 - 7 % BASOPHILS 1 0 - 1 % IMMATURE GRANULOCYTES 2 (H) 0.0 - 0.5 % ABS. NEUTROPHILS 3.3 1.8 - 8.0 K/UL  
 ABS. LYMPHOCYTES 1.4 0.8 - 3.5 K/UL  
 ABS. MONOCYTES 0.8 0.0 - 1.0 K/UL  
 ABS. EOSINOPHILS 0.3 0.0 - 0.4 K/UL  
 ABS. BASOPHILS 0.1 0.0 - 0.1 K/UL  
 ABS. IMM. GRANS. 0.1 (H) 0.00 - 0.04 K/UL  
 DF AUTOMATED METABOLIC PANEL, BASIC Collection Time: 08/24/20  3:45 AM  
Result Value Ref Range Sodium 137 136 - 145 mmol/L Potassium 4.5 3.5 - 5.1 mmol/L Chloride 110 (H) 97 - 108 mmol/L  
 CO2 23 21 - 32 mmol/L Anion gap 4 (L) 5 - 15 mmol/L Glucose 102 (H) 65 - 100 mg/dL BUN 24 (H) 6 - 20 MG/DL Creatinine 1.57 (H) 0.55 - 1.02 MG/DL  
 BUN/Creatinine ratio 15 12 - 20 GFR est AA 41 (L) >60 ml/min/1.73m2 GFR est non-AA 34 (L) >60 ml/min/1.73m2 Calcium 8.6 8.5 - 10.1 MG/DL  
SAMPLES BEING HELD Collection Time: 08/24/20  3:45 AM  
Result Value Ref Range SAMPLES BEING HELD 1PST COMMENT Add-on orders for these samples will be processed based on acceptable specimen integrity and analyte stability, which may vary by analyte. GLUCOSE, POC Collection Time: 08/24/20  8:14 AM  
Result Value Ref Range Glucose (POC) 108 (H) 65 - 100 mg/dL Performed by Alice Mcgovern Recent Labs  
  08/24/20 0345 08/23/20 
0023 WBC 5.9 6.0 HGB 9.2* 9.3* HCT 31.3* 30.9* * 466* Recent Labs  
  08/24/20 
0345 08/23/20 
0023 08/22/20 
8213  138 139  
K 4.5 4.1 4.2 * 110* 111* CO2 23 22 22 BUN 24* 27* 30* CREA 1.57* 1.84* 1.90* * 106* 99  
CA 8.6 8.5 8.9 No results for input(s): ALT, AP, TBIL, TBILI, TP, ALB, GLOB, GGT, AML, LPSE in the last 72 hours. No lab exists for component: SGOT, GPT, AMYP, HLPSE No results for input(s): INR, PTP, APTT, INREXT, INREXT in the last 72 hours. No results for input(s): FE, TIBC, PSAT, FERR in the last 72 hours. Lab Results Component Value Date/Time Folate 8.5 03/14/2020 02:49 PM  
  
No results for input(s): PH, PCO2, PO2 in the last 72 hours. No results for input(s): CPK, CKNDX, TROIQ in the last 72 hours. No lab exists for component: CPKMB Lab Results Component Value Date/Time Cholesterol, total 141 11/11/2019 08:47 AM  
 HDL Cholesterol 32 (L) 11/11/2019 08:47 AM  
 LDL, calculated 82 11/11/2019 08:47 AM  
 Triglyceride 137 11/11/2019 08:47 AM  
 
Lab Results Component Value Date/Time Glucose (POC) 108 (H) 08/24/2020 08:14 AM  
 Glucose (POC) 132 (H) 08/23/2020 09:24 PM  
 Glucose (POC) 139 (H) 08/23/2020 05:33 PM  
 Glucose (POC) 123 (H) 08/23/2020 12:21 PM  
 Glucose (POC) 100 08/23/2020 06:32 AM  
 
Lab Results Component Value Date/Time  Color YELLOW/STRAW 05/23/2020 02:35 PM  
 Appearance CLOUDY (A) 05/23/2020 02:35 PM  
 Specific gravity 1.009 05/23/2020 02:35 PM  
 pH (UA) 6.0 05/23/2020 02:35 PM  
 Protein Negative 05/23/2020 02:35 PM  
 Glucose Negative 05/23/2020 02:35 PM  
 Ketone Negative 05/23/2020 02:35 PM  
 Bilirubin Negative 05/23/2020 02:35 PM  
 Urobilinogen 0.2 05/23/2020 02:35 PM  
 Nitrites Positive (A) 05/23/2020 02:35 PM  
 Leukocyte Esterase LARGE (A) 05/23/2020 02:35 PM  
 Epithelial cells FEW 05/23/2020 02:35 PM  
 Bacteria 4+ (A) 05/23/2020 02:35 PM  
 WBC >100 (H) 05/23/2020 02:35 PM  
 RBC 0-5 05/23/2020 02:35 PM  
 
 
Med list reviewed Current Facility-Administered Medications Medication Dose Route Frequency  0.9% sodium chloride infusion  20 mL/hr IntraVENous CONTINUOUS  
 hydrALAZINE (APRESOLINE) 20 mg/mL injection 20 mg  20 mg IntraVENous Q6H PRN  
 hydrALAZINE (APRESOLINE) tablet 100 mg  100 mg Oral TID  busPIRone (BUSPAR) tablet 10 mg  10 mg Oral BID  dilTIAZem ER (CARDIZEM CD) capsule 180 mg  180 mg Oral DAILY  sertraline (ZOLOFT) tablet 75 mg  75 mg Oral DAILY  sodium chloride (NS) flush 5-40 mL  5-40 mL IntraVENous Q8H  
 sodium chloride (NS) flush 5-40 mL  5-40 mL IntraVENous PRN  
 acetaminophen (TYLENOL) tablet 650 mg  650 mg Oral Q6H PRN Or  
 acetaminophen (TYLENOL) suppository 650 mg  650 mg Rectal Q6H PRN  polyethylene glycol (MIRALAX) packet 17 g  17 g Oral DAILY PRN  promethazine (PHENERGAN) tablet 12.5 mg  12.5 mg Oral Q6H PRN Or  
 ondansetron (ZOFRAN) injection 4 mg  4 mg IntraVENous Q6H PRN  
 HYDROmorphone (PF) 25 mg/50 mL (DILAUDID) PCA   IntraVENous CONTINUOUS  Vancomycin - pharmacy to dose   Other Rx Dosing/Monitoring  glucose chewable tablet 16 g  4 Tab Oral PRN  
 glucagon (GLUCAGEN) injection 1 mg  1 mg IntraMUSCular PRN  
 dextrose 10% infusion 0-250 mL  0-250 mL IntraVENous PRN  
 gabapentin (NEURONTIN) capsule 100 mg  100 mg Oral TID PRN  
 insulin regular (NOVOLIN R, HUMULIN R) injection   SubCUTAneous AC&HS Care Plan discussed with:  Patient/Family and Nurse Dieter Rodriguez MD 
Internal Medicine Date of Service: 8/24/2020

## 2020-08-24 NOTE — ROUTINE PROCESS
Patient: Yajaira Swanson MRN: [id number]  SSN: [id number] YOB: 1960  Age: 60 y.o. Sex: female Patient is status post Procedure(s): RIGHT CHEST WOUND RE EXPLORATION, DELAYED CLOSURE (URGENT). Surgeon(s) and Role: Bere Bustillo MD - Primary Local/Dose/Irrigation:  60 ml local given. See [facility] Peripheral IV 08/20/20 Anterior; Left Hand (Active) Site Assessment Clean, dry, & intact 08/24/20 0330 Phlebitis Assessment 0 08/24/20 0330 Infiltration Assessment 0 08/24/20 0330 Dressing Status Clean, dry, & intact 08/24/20 0330 Dressing Type Tape;Transparent 08/24/20 0330 Hub Color/Line Status Pink; Infusing 08/24/20 0330 Action Taken Open ports on tubing capped 08/24/20 0330 Alcohol Cap Used No 08/24/20 0330 Peripheral IV 08/22/20 Arm (Active) Site Assessment Clean, dry, & intact 08/24/20 0330 Phlebitis Assessment 0 08/24/20 0330 Infiltration Assessment 0 08/24/20 0330 Dressing Status Clean, dry, & intact 08/24/20 0330 Dressing Type Tape;Transparent 08/24/20 0330 Hub Color/Line Status Pink;Flushed;Capped 08/24/20 0330 Action Taken Open ports on tubing capped 08/24/20 0330 Alcohol Cap Used Yes 08/24/20 0330 Myna Hair #1 08/24/20 Right;Mid Chest (Active) Site Assessment Clean, dry, & intact 08/24/20 1357 Dressing Status Clean, dry, & intact 08/24/20 1357 Drainage Description Serosanguinous 08/24/20 1357 Status Charged;Draining;Patent 08/24/20 1357 Myna Hair #2 08/24/20 Right; Outer;Mid Chest (Active) Site Assessment Clean, dry, & intact 08/24/20 1357 Dressing Status Clean, dry, & intact 08/24/20 1357 Drainage Description Serosanguinous 08/24/20 1357 Status Charged; Patent;Draining 08/24/20 1357 External Female Catheter 08/21/20 (Active) Site Assessment Clean, dry, & intact 08/24/20 0755 Repositioned Yes 08/24/20 0755 Perineal Care Yes 08/24/20 6214 Charna Flow Yes 08/24/20 0753 Suction Canister/Tubing Changed Yes 08/24/20 0755 Urine Output (mL) 0 ml 08/24/20 0755 Airway - Endotracheal Tube 08/24/20 Oral (Active) Dressing/Packing:  Wound Neck Right PLACEMENT OF WOUND VAC-Dressing Type: Vacuum dressing (08/24/20 0443) Wound Gluteal fold/cleft Right-Dressing Type: Open to air (08/23/20 0526) Wound Chest Right Incision site and 2 drain sites-Dressing Type: 4 x 4;Fluffs;Split gauze;Special tape (comment) (08/24/20 1300) Splint/Cast:  ] Other:

## 2020-08-24 NOTE — PERIOP NOTES
Patient interviewed in holding area, identity and procedure verified. Patient states that no one to call at start of case.

## 2020-08-24 NOTE — PROGRESS NOTES
Infectious Disease Progress Note Subjective: Ms Wilfredo Yeboah seen Denied nausea vomiting or diarrhea Had pain in the sternoclavicular joint area on the right side which is improved Says has hardware in her neck but denied neck pain Denies issues with the left lower extremity stump area ROS: 10 point ROS obtained and pertinent positives includeabove. All others negative. Objective: 
 
Vitals:  
Patient Vitals for the past 24 hrs: 
 Temp Pulse Resp BP SpO2  
08/24/20 0811 99.6 °F (37.6 °C) 95 12 182/75 98 % 08/24/20 0753 98 °F (36.7 °C) 98 13 (!) 170/94 97 % 08/24/20 0330 97.9 °F (36.6 °C) 88 13 165/65 98 % 08/23/20 2310 97.9 °F (36.6 °C) 91 12 160/68 97 % 08/23/20 2023 100.2 °F (37.9 °C) 99 14 150/60 96 % 08/23/20 1557 99.3 °F (37.4 °C) 100 14 140/55 97 % 08/23/20 1215 98.2 °F (36.8 °C) 100 14 156/71 97 % 08/23/20 1107 99.2 °F (37.3 °C) (!) 111 18 149/67 96 % Physical Exam: 
Gen: No apparent distress HEENT:   no scleral icterus, no thrush, CV: S1,2 heard regularly, no lower extremity edema  , + wound vac, no erythema chest wall Lungs: Clear to auscultation bilaterally, Abdomen: soft, non tender,  
Skin: no rash , RLE chronic venous changes to skin Psych: good affect, good eye contact, non tearful Neuro: alert, oriented to self,  place, and situation, moves all extremities to commands, verbal 
Musculoskeletal:  No joint edema, erythema or tenderness noted RLE, + LLE BKA Medications: 
 
Current Facility-Administered Medications:   Vancomycin random level- please draw at noon on 8/24. Thanks!, , Other, ONCE, Scooby Chavez MD 
  0.9% sodium chloride infusion, 20 mL/hr, IntraVENous, CONTINUOUS, Alena Tenorio President, NP, Last Rate: 20 mL/hr at 08/22/20 1257, 20 mL/hr at 08/22/20 1257   hydrALAZINE (APRESOLINE) 20 mg/mL injection 20 mg, 20 mg, IntraVENous, Q6H PRN, Madala, Sushma, MD, 20 mg at 08/21/20 1143   hydrALAZINE (APRESOLINE) tablet 100 mg, 100 mg, Oral, TID, Madala, Sushma, MD, 100 mg at 08/23/20 2131 
  busPIRone (BUSPAR) tablet 10 mg, 10 mg, Oral, BID, Jaylene Krueger, DO, 10 mg at 08/23/20 1719 
  dilTIAZem ER (CARDIZEM CD) capsule 180 mg, 180 mg, Oral, DAILY, Aditya GRIMES DO, 180 mg at 08/23/20 6081   sertraline (ZOLOFT) tablet 75 mg, 75 mg, Oral, DAILY, Aditya GRIMES DO, 75 mg at 08/23/20 0178   sodium chloride (NS) flush 5-40 mL, 5-40 mL, IntraVENous, Q8H, Cleo Krueger DO, 10 mL at 08/24/20 3610   sodium chloride (NS) flush 5-40 mL, 5-40 mL, IntraVENous, PRN, Aditya GRIMES DO 
  acetaminophen (TYLENOL) tablet 650 mg, 650 mg, Oral, Q6H PRN, 650 mg at 08/21/20 1602 **OR** acetaminophen (TYLENOL) suppository 650 mg, 650 mg, Rectal, Q6H PRN, Jaylene Garzon, DO 
  polyethylene glycol (MIRALAX) packet 17 g, 17 g, Oral, DAILY PRN, Sherin An [employer] M, DO 
  promethazine (PHENERGAN) tablet 12.5 mg, 12.5 mg, Oral, Q6H PRN **OR** ondansetron (ZOFRAN) injection 4 mg, 4 mg, IntraVENous, Q6H PRN, Sherin An Jaylene SYDNEY, DO 
  HYDROmorphone (PF) 25 mg/50 mL (DILAUDID) PCA, , IntraVENous, CONTINUOUS, Rocío Landin MD 
  Vancomycin - pharmacy to dose, , Other, Rx Dosing/Monitoring, Sherin Seaalcira, [employer], DO 
  glucose chewable tablet 16 g, 4 Tab, Oral, PRN, Sherin Seaalcira, [employer], DO 
  glucagon (GLUCAGEN) injection 1 mg, 1 mg, IntraMUSCular, PRN, Aditya GRIMES DO 
  dextrose 10% infusion 0-250 mL, 0-250 mL, IntraVENous, PRN, Sherinnba An, [employer], DO 
  gabapentin (NEURONTIN) capsule 100 mg, 100 mg, Oral, TID PRN, Aditya GRIMES DO 
  insulin regular (NOVOLIN R, HUMULIN R) injection, , SubCUTAneous, AC&HS, Aditya GRIMES DO, Stopped at 08/22/20 1630 Labs: 
Recent Results (from the past 24 hour(s)) GLUCOSE, POC Collection Time: 08/23/20 12:21 PM  
Result Value Ref Range Glucose (POC) 123 (H) 65 - 100 mg/dL Performed by Marichuy Beltran GLUCOSE, POC  
 Collection Time: 08/23/20  5:33 PM  
Result Value Ref Range Glucose (POC) 139 (H) 65 - 100 mg/dL Performed by Dottie Bustillos GLUCOSE, POC Collection Time: 08/23/20  9:24 PM  
Result Value Ref Range Glucose (POC) 132 (H) 65 - 100 mg/dL Performed by Orvis Moulton PCT   
CBC WITH AUTOMATED DIFF Collection Time: 08/24/20  3:45 AM  
Result Value Ref Range WBC 5.9 3.6 - 11.0 K/uL  
 RBC 3.22 (L) 3.80 - 5.20 M/uL HGB 9.2 (L) 11.5 - 16.0 g/dL HCT 31.3 (L) 35.0 - 47.0 % MCV 97.2 80.0 - 99.0 FL  
 MCH 28.6 26.0 - 34.0 PG  
 MCHC 29.4 (L) 30.0 - 36.5 g/dL  
 RDW 15.9 (H) 11.5 - 14.5 % PLATELET 391 (H) 674 - 400 K/uL MPV 10.1 8.9 - 12.9 FL  
 NRBC 0.0 0  WBC ABSOLUTE NRBC 0.00 0.00 - 0.01 K/uL NEUTROPHILS 55 32 - 75 % LYMPHOCYTES 23 12 - 49 % MONOCYTES 14 (H) 5 - 13 % EOSINOPHILS 5 0 - 7 % BASOPHILS 1 0 - 1 % IMMATURE GRANULOCYTES 2 (H) 0.0 - 0.5 % ABS. NEUTROPHILS 3.3 1.8 - 8.0 K/UL  
 ABS. LYMPHOCYTES 1.4 0.8 - 3.5 K/UL  
 ABS. MONOCYTES 0.8 0.0 - 1.0 K/UL  
 ABS. EOSINOPHILS 0.3 0.0 - 0.4 K/UL  
 ABS. BASOPHILS 0.1 0.0 - 0.1 K/UL  
 ABS. IMM. GRANS. 0.1 (H) 0.00 - 0.04 K/UL  
 DF AUTOMATED METABOLIC PANEL, BASIC Collection Time: 08/24/20  3:45 AM  
Result Value Ref Range Sodium 137 136 - 145 mmol/L Potassium 4.5 3.5 - 5.1 mmol/L Chloride 110 (H) 97 - 108 mmol/L  
 CO2 23 21 - 32 mmol/L Anion gap 4 (L) 5 - 15 mmol/L Glucose 102 (H) 65 - 100 mg/dL BUN 24 (H) 6 - 20 MG/DL Creatinine 1.57 (H) 0.55 - 1.02 MG/DL  
 BUN/Creatinine ratio 15 12 - 20 GFR est AA 41 (L) >60 ml/min/1.73m2 GFR est non-AA 34 (L) >60 ml/min/1.73m2 Calcium 8.6 8.5 - 10.1 MG/DL  
SAMPLES BEING HELD Collection Time: 08/24/20  3:45 AM  
Result Value Ref Range SAMPLES BEING HELD 1PST COMMENT Add-on orders for these samples will be processed based on acceptable specimen integrity and analyte stability, which may vary by analyte. GLUCOSE, POC Collection Time: 08/24/20  8:14 AM  
Result Value Ref Range Glucose (POC) 108 (H) 65 - 100 mg/dL Performed by Shady Thomas Micro:  
  Blood: 8/20/20 Specimen Information:  Blood   
     
Component  Value  Ref Range & Units  Status Special Requests:  NO SPECIAL REQUESTS     Preliminary Culture result:  NO GROWTH 4 DAYS Wound Sternoclavicular joint Tissue RIGHT STERNOCLAVICULAR JOINT A   
     
Component  Value  Ref Range & Units  Status Special Requests:  NO SPECIAL REQUESTS     Final   
GRAM STAIN  NO WBC'S SEEN     Final   
GRAM STAIN  NO ORGANISMS SEEN     Final   
Culture result: Abnormal        Final   
HEAVY STAPHYLOCOCCUS AUREUS Susceptibility Staphylococcus aureus BRITTON Ciprofloxacin ($)  <=0.5 ug/mL  S Clindamycin ($)  0.25 ug/mL  S Daptomycin ($$$$$)  0.25 ug/mL  S Doxycycline ($$)  <=0.5 ug/mL  S Erythromycin ($$$$)  <=0.25 ug/mL  S Gentamicin ($)  <=0.5 ug/mL  S Levofloxacin ($)  <=0.12 ug/mL  S Linezolid ($$$$$)  2 ug/mL  S Moxifloxacin ($$$$)  <=0.25 ug/mL  S Oxacillin  <=0.25 ug/mL  S Rifampin ($$$$)  <=0.5 ug/mL  S1 Tetracycline  <=1 ug/mL  S Trimeth/Sulfa  <=10 ug/mL  S Vancomycin ($)  1 ug/mL  S Component  Value  Ref Range & Units  Status Special Requests:    Final   
RIGHT STERNOCLAVICULAR JOINT ABSCESS Culture result:  NO ANAEROBES ISOLATED     Final   
Result History Tissue RIGHT STERNOCLAVICULAR JOINT A   
     
Component  Value  Ref Range & Units  Status Special Requests:  NO SPECIAL REQUESTS     Final   
GRAM STAIN  NO WBC'S SEEN     Final   
GRAM STAIN  NO ORGANISMS SEEN     Final   
Culture result: Abnormal        Final   
HEAVY STAPHYLOCOCCUS AUREUS Susceptibility Staphylococcus aureus BRITTON Ciprofloxacin ($)  <=0.5 ug/mL  S Clindamycin ($)  0.25 ug/mL  S Daptomycin ($$$$$)  0.25 ug/mL  S    
 Doxycycline ($$)  <=0.5 ug/mL  S Erythromycin ($$$$)  <=0.25 ug/mL  S Gentamicin ($)  <=0.5 ug/mL  S Levofloxacin ($)  <=0.12 ug/mL  S Linezolid ($$$$$)  2 ug/mL  S Moxifloxacin ($$$$)  <=0.25 ug/mL  S Oxacillin  <=0.25 ug/mL  S Rifampin ($$$$)  <=0.5 ug/mL  S1 Tetracycline  <=1 ug/mL  S Trimeth/Sulfa  <=10 ug/mL  S Vancomycin ($)  1 ug/mL  S Component  Value  Ref Range & Units  Status Special Requests:    Final   
RIGHT STERNOCLAVICULAR JOINT ABSCESS Culture result:  NO ANAEROBES ISOLATED     Final   
Result History CULTURE, ANAEROBIC on 8/22/2020 Imaging: 
CT chest 8/19/20 
  
FINDINGS: 
  
CHEST WALL: No mass or axillary lymphadenopathy. THYROID: No nodule. MEDIASTINUM: No mass or lymphadenopathy. ASHLYN: No mass or lymphadenopathy. THORACIC AORTA: No aneurysm. MAIN PULMONARY ARTERY: Normal in caliber. TRACHEA/BRONCHI: Patent. ESOPHAGUS: No wall thickening or dilatation. HEART: Coronary artery calcifications are present. PLEURA: No effusion or pneumothorax. LUNGS: No nodule, mass, or airspace disease. INCIDENTALLY IMAGED UPPER ABDOMEN: There is a right adrenal adenoma measuring 2.8 cm. BONES: There is inflammation involving the right sternoclavicular joint. There 
is no focal fluid collection. 
  
IMPRESSION IMPRESSION: 
Suspect septic arthritis of the right sternoclavicular joint. No drainable fluid 
collection. Assessment / Plan Ms. Flores Ardon is a 58-year-old lady with a history of diabetes, hypertension, A. 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 
who was transferred from [facility] for right sternoclavicular joint osteomyelitis//septic joint. She underwent incision and debridement of the right sternoclavicular joint, resection of the middle third of the right clavicle and placement of wound VAC on 8/20/2020. Operative findings include \" jacinta purulence within the [place] joint which was eroded\" Her blood culture on 8/20/2020 was negative but wound cultures from [place] joint debridement were positive for MSSA She is currently on IV vancomycin She reports a history of myalgias and muscle issues while on daptomycin previously She lists an allergy to antibiotics including Bactrim as well as penicillin. She had hives with penicillin. Cephalosporin was attempted and she says she passed out almost and had no idea what was happening to her when she received cefepime in the past. 
 
1) sternoclavicular joint abscess, osteomyelitis History of MSSA bacteremia, pacer vegetation status post removal and endocarditis treatment in April to May 2020 Left foot osteomyelitis status post amputation/BKA May 2020 I discussed with the patient that vancomycin is likely not the best medication for MSSA. However given her issues with antibiotics in the past we will continue with IV vancomycin as choices are limited Check TTE May need MIKE as well Renally dose vancomycin and monitor for nephrotoxicity which was discussed with the patient in detail Antibiotic side effects/adverse effects/toxicities discussed including gastrointestinal, renal, hematological , ability to lower siezure threshold, risk for C diff infection. Probiotics, daily yogurt encouraged. Will at minimum plan for 6 weeks of IV antibiotics if tolerated Check weekly labs for CBC with differential, BMP, ESR, CRP and vancomycin trough Vancomycin dosing to be adjusted by pharmacy for trough goal of 15-20 One other option is p.o. Zyvox but patient usually do not tolerate 6 weeks of Zyvox secondary to bone marrow suppression and also has drug drug interactions Agree with holding vancomycin currently and monitoring levels daily to see when reinitiation needed 2) A. fib 3) PARAG Renally dose vancomycin 4) DVT prophylaxis 5) screening hep C antibody negative in March 2020 Check HIV-patient verbally consented Thank you for the opportunity to participate in the care of this patient. Please contact with questions or concerns.    
 
Jaye Gruber DO  
10:23 AM

## 2020-08-24 NOTE — ANESTHESIA POSTPROCEDURE EVALUATION
Procedure(s): RIGHT CHEST WOUND RE EXPLORATION, DELAYED CLOSURE (URGENT). general 
 
Anesthesia Post Evaluation Multimodal analgesia: multimodal analgesia used between 6 hours prior to anesthesia start to PACU discharge Patient location during evaluation: bedside Patient participation: waiting for patient participation Level of consciousness: awake Pain management: adequate Airway patency: patent Anesthetic complications: no 
Cardiovascular status: acceptable Respiratory status: unassisted Hydration status: acceptable Comments: Post-Anesthesia Evaluation and Assessment I have evaluated the patient and they are ready for PACU discharge. Patient: Zeke Talamantes MRN: [id number]  SSN: [id number] YOB: 1960  Age: 60 y.o. Sex: female Cardiovascular Function/Vital Signs /58   Pulse (!) 104   Temp 36.6 °C (97.9 °F)   Resp 12   Ht 5' 10\" (1.778 m)   Wt 106.5 kg (234 lb 12.6 oz)   SpO2 100%   BMI 33.69 kg/m² Patient is status post General anesthesia for Procedure(s): RIGHT CHEST WOUND RE EXPLORATION, DELAYED CLOSURE (URGENT). Nausea/Vomiting: None Postoperative hydration reviewed and adequate. Pain: 
Pain Scale 1: Numeric (0 - 10) (08/24/20 1544) Pain Intensity 1: 0 (08/24/20 1544) Managed Neurological Status:  
Neuro (WDL): (no change) (08/24/20 1544) Neuro Neurologic State: Drowsy (08/24/20 1455) Orientation Level: Oriented to person;Oriented to place (08/24/20 1455) LUE Motor Response: Purposeful (08/24/20 1455) LLE Motor Response: Purposeful(BKA) (08/24/20 1455) RUE Motor Response: (surgery shoulder area, painful to move) (08/24/20 1455) RLE Motor Response: Purposeful (08/24/20 1455) At baseline Mental Status, Level of Consciousness: Alert and  oriented to person, place, and time Pulmonary Status:  
O2 Device: Room air (08/24/20 1544) Adequate oxygenation and airway patent Complications related to anesthesia: None Post-anesthesia assessment completed. No concerns Signed By: Marcos Dean MD  
 August 24, 2020 INITIAL Post-op Vital signs:  
Vitals Value Taken Time /58 8/24/2020  5:15 PM  
Temp 36.6 °C (97.9 °F) 8/24/2020  4:18 PM  
Pulse 99 8/24/2020  5:16 PM  
Resp 10 8/24/2020  5:16 PM  
SpO2 100 % 8/24/2020  5:16 PM  
Vitals shown include unvalidated device data.

## 2020-08-24 NOTE — PROGRESS NOTES
Physical Therapy Patient is currently off the unit in OR. We will follow up tomorrow.  
 
Dilip Bar, PT

## 2020-08-24 NOTE — H&P
Date of Surgery Update: 
Connie Zavala was seen and examined. History and physical has been reviewed. The patient has been examined.  There have been no significant clinical changes since the completion of the originally dated History and Physical. 
 
Signed By: Fior Wyatt MD   
 August 24, 2020 12:44 PM

## 2020-08-24 NOTE — PROGRESS NOTES
Problem: Risk for Spread of Infection Goal: Prevent transmission of infectious organism to others Description: Prevent the transmission of infectious organisms to other patients, staff members, and visitors. Outcome: Progressing Towards Goal 
Note: Patient is on modified contact for ESBL in urine. Problem: Falls - Risk of 
Goal: *Absence of Falls Description: Document Nona Lea Fall Risk and appropriate interventions in the flowsheet. Outcome: Progressing Towards Goal 
Note: Fall Risk Interventions: 
Mobility Interventions: Communicate number of staff needed for ambulation/transfer, PT Consult for mobility concerns, OT consult for ADLs Medication Interventions: Evaluate medications/consider consulting pharmacy, Patient to call before getting OOB, Teach patient to arise slowly Elimination Interventions: Call light in reach, Patient to call for help with toileting needs, Toileting schedule/hourly rounds Problem: Pain Goal: *Control of Pain Outcome: Progressing Towards Goal 
Note: Patient is currently on a PCA pump for pain control. Problem: Nutrition Deficit Goal: *Optimize nutritional status Outcome: Progressing Towards Goal 
Note: Patient is currently NPO at midnight. Problem: Pressure Injury - Risk of 
Goal: *Prevention of pressure injury Description: Document Valdemar Scale and appropriate interventions in the flowsheet. Outcome: Progressing Towards Goal 
Note: Pressure Injury Interventions: 
Sensory Interventions: Keep linens dry and wrinkle-free, Minimize linen layers, Turn and reposition approx. every two hours (pillows and wedges if needed) Moisture Interventions: Absorbent underpads, Internal/External urinary devices, Minimize layers, Moisture barrier Activity Interventions: Pressure redistribution bed/mattress(bed type), Increase time out of bed, PT/OT evaluation Mobility Interventions: Pressure redistribution bed/mattress (bed type), HOB 30 degrees or less, Turn and reposition approx. every two hours(pillow and wedges), PT/OT evaluation Nutrition Interventions: Document food/fluid/supplement intake Friction and Shear Interventions: Apply protective barrier, creams and emollients, Minimize layers, Transferring/repositioning devices

## 2020-08-24 NOTE — PROGRESS NOTES
Last 3 Recorded Weights in this Encounter 08/22/20 3786 08/23/20 0309 08/24/20 9158 Weight: 102.5 kg (225 lb 15.5 oz) 109.5 kg (241 lb 6.5 oz) 106.5 kg (234 lb 12.6 oz) Bedside shift change report given to Michelle Duke (oncoming nurse) by Olaf Velez RN (offgoing nurse). Report included the following information SBAR, Kardex, ED Summary, MAR, Recent Results and Cardiac Rhythm Afib.

## 2020-08-24 NOTE — PROGRESS NOTES
Pt transported to OR with OR staff. Report called to Al Stuart RN. VSS. Pt a/o x4 gcs 15. Airway patent resp e/u. Pt stable for transport. Pt given po meds prior to surgery per dr. Pasha Hankins anestheiology.

## 2020-08-24 NOTE — PROGRESS NOTES
Pharmacist Note - Vancomycin Dosing Therapy day 5 Indication: Infected sternoclavicular wound with MSSA Current regimen: Dosing by levels A Random Level resulted at 22.4 mcg/mL which was obtained 36 hrs post-dose. Goal trough: 10 - 15 mcg/mL Plan: Continue to hold dose and repeat a random level tomorrow morning. Pharmacy will continue to monitor this patient daily for changes in clinical status and renal function.

## 2020-08-24 NOTE — BRIEF OP NOTE
Brief Postoperative Note Patient: Alma Law YOB: 1960 MRN: [id number] Date of Procedure: 8/24/2020 Pre-Op Diagnosis: SEPSIS Post-Op Diagnosis: Same as preoperative diagnosis. Procedure(s): RIGHT CHEST WOUND RE EXPLORATION, DELAYED CLOSURE (URGENT) Surgeon(s): 
Kassie Ramirez MD 
 
Surgical Assistant: Physician Assistant: Ileana Habermann, [place] Anesthesia: General  
 
Estimated Blood Loss (mL): less than 100 Complications: None Specimens:  
ID Type Source Tests Collected by Time Destination 1 : manubrium Wound Chest CULTURE, FUNGUS, GRAM STAIN, CULTURE, WOUND W GRAM STAIN, AEROBIC CULTURE + GRAM STAIN Kassie Ramirez MD 8/24/2020 1328 Microbiology Implants: * No implants in log * Drains:  
Malick Dorcas #1 08/24/20 Right;Mid Chest (Active) Site Assessment Clean, dry, & intact 08/24/20 1357 Dressing Status Clean, dry, & intact 08/24/20 1357 Drainage Description Serosanguinous 08/24/20 1357 Status Charged;Draining;Patent 08/24/20 1357 Output (ml) 40 ml 08/24/20 1357 Malickus Evansh #2 08/24/20 Right; Outer;Mid Chest (Active) Site Assessment Clean, dry, & intact 08/24/20 1357 Dressing Status Clean, dry, & intact 08/24/20 1357 Drainage Description Serosanguinous 08/24/20 1357 Status Charged; Patent;Draining 08/24/20 1357 External Female Catheter 08/21/20 (Active) Site Assessment Clean, dry, & intact 08/24/20 0755 Repositioned Yes 08/24/20 0755 Perineal Care Yes 08/24/20 5775 Jacquetta Query Yes 08/24/20 0755 Suction Canister/Tubing Changed Yes 08/24/20 0755 Urine Output (mL) 0 ml 08/24/20 0755 Findings: right pectoralis flap performed to cover defect Electronically Signed by Ratna Wright MD on 8/24/2020 at 3:06 PM

## 2020-08-25 NOTE — PROGRESS NOTES
Pharmacist Note - Vancomycin Dosing Therapy day 6 Indication: suspected infected sternoclavicular joint; recent complicated hospitalization with MSSA bacteremia/endocarditis; s/p Incision and debridement of right sternoclavicular joint; resection of medial third of right clavicle; placement of wound VAC Current regimen: Dosing by levels- last dose 1250 mg given on 8/23 @ 0032)- A Random Level resulted at 18.6 mcg/mL which was obtained 51 hrs post-dose. Goal trough: 10 - 15 mcg/mL Plan : Will order vancomycin 1250 mg x1 for 3 pm, vancomycin level will drop ~ 14-15 mcg/ml Pharmacy will continue to monitor this patient daily for changes in clinical status and renal function. Dose was infused prior to level evaluation

## 2020-08-25 NOTE — PROGRESS NOTES
Bedside shift change report given to Liana Scanlon (oncoming nurse) by Arun Beach RN (offgoing nurse). Report included the following information SBAR, Kardex, ED Summary, MAR, Recent Results and Cardiac Rhythm Afib. Last 3 Recorded Weights in this Encounter 08/23/20 0309 08/24/20 3358 08/25/20 0327 Weight: 109.5 kg (241 lb 6.5 oz) 106.5 kg (234 lb 12.6 oz) 107.2 kg (236 lb 5.3 oz)

## 2020-08-25 NOTE — PROGRESS NOTES
Infectious Disease Progress Note Subjective: Ms Katrina Orellana seen Pain in surgical incision area Says has hardware in her neck but denied neck pain Denies issues with the left lower extremity stump area ROS: 10 point ROS obtained and pertinent positives includeabove. All others negative. Objective: 
 
Vitals:  
Patient Vitals for the past 24 hrs: 
 Temp Pulse Resp BP SpO2  
08/25/20 1137 99.2 °F (37.3 °C) (!) 112 19 163/72 100 % 08/25/20 0850 98.2 °F (36.8 °C) (!) 104 19 182/79 100 % 08/25/20 0327 98.2 °F (36.8 °C) (!) 104 18 154/72 100 % 08/24/20 2320 98 °F (36.7 °C) (!) 110 14 146/60 100 % 08/24/20 2045 98.1 °F (36.7 °C) (!) 104 11 147/65 100 % 08/24/20 1815  98 10 142/62 100 % 08/24/20 1810  (!) 101 12  100 % 08/24/20 1745  98 10 126/54 100 % 08/24/20 1730    136/59   
08/24/20 1720  (!) 107 11  100 % 08/24/20 1714  (!) 104 12 125/58 100 % 08/24/20 1630  (!) 101 10 136/61 100 % 08/24/20 1618 97.9 °F (36.6 °C)      
08/24/20 1615  (!) 110 12 121/58   
08/24/20 1605  (!) 108 10  100 % 08/24/20 1545    138/58   
08/24/20 1535  (!) 111 8  100 % 08/24/20 1530  (!) 111 14 142/57 100 % 08/24/20 1525  (!) 116 10  100 % 08/24/20 1520  (!) 116 11  100 % 08/24/20 1510  (!) 119 10  100 % 08/24/20 1505  (!) 111 8 158/76 100 % 08/24/20 1500  (!) 112 11 157/58 100 % 08/24/20 1455 97.8 °F (36.6 °C) (!) 113 9 162/62 100 % Physical Exam: 
Gen: No apparent distress HEENT:   no scleral icterus, no thrush, CV: S1,2 heard regularly, no lower extremity edema  , + dressing chest  
Lungs: Clear to auscultation bilaterally, Abdomen: soft, non tender,  
Skin: no rash , RLE chronic venous changes to skin Musculoskeletal:  No joint edema, erythema or tenderness noted RLE, + LLE BKA Medications: 
 
Current Facility-Administered Medications:  
  vancomycin (VANCOCIN) 1250 mg in  ml infusion, 1,250 mg, IntraVENous, ONCE, Salazar Constantino MD 
  sodium chloride (NS) flush 5-40 mL, 5-40 mL, IntraVENous, Q8H, Bridgette Layton PA, 10 mL at 08/25/20 0600 
  sodium chloride (NS) flush 5-40 mL, 5-40 mL, IntraVENous, PRN, LORNA Macias 
  fentaNYL (PF) 1,500 mcg/30 mL PCA, , IntraVENous, TITRATE, LORNA Macias 
  [facility]) injection 0.1 mg, 0.1 mg, IntraVENous, PRN, LORNA Macias 
  diphenhydrAMINE (BENADRYL) injection 12.5 mg, 12.5 mg, IntraVENous, Q6H PRN, LORNA Macias 
  bisacodyL (DULCOLAX) tablet 10 mg, 10 mg, Oral, DAILY, LORNA Macias 
  lactated Ringers infusion, 25 mL/hr, IntraVENous, CONTINUOUS, Yanna Macias, Last Rate: 25 mL/hr at 08/24/20 1708, 25 mL/hr at 08/24/20 1708 
  0.9% sodium chloride infusion, 20 mL/hr, IntraVENous, CONTINUOUS, Damari Valencia NP, Last Rate: 20 mL/hr at 08/25/20 0858, 20 mL/hr at 08/25/20 4730   hydrALAZINE (APRESOLINE) 20 mg/mL injection 20 mg, 20 mg, IntraVENous, Q6H PRN, Madala, Sushma, MD, 20 mg at 08/21/20 1148   hydrALAZINE (APRESOLINE) tablet 100 mg, 100 mg, Oral, TID, Angie Bradshaw MD, 100 mg at 08/25/20 8650   busPIRone (BUSPAR) tablet 10 mg, 10 mg, Oral, BID, Rebecca GRIMES DO, 10 mg at 08/25/20 5382   dilTIAZem ER (CARDIZEM CD) capsule 180 mg, 180 mg, Oral, DAILY, Rebecca GRIMES DO, 180 mg at 08/25/20 9280   sertraline (ZOLOFT) tablet 75 mg, 75 mg, Oral, DAILY, Rebecca GRIMES DO, 75 mg at 08/25/20 6417   sodium chloride (NS) flush 5-40 mL, 5-40 mL, IntraVENous, Q8H, Cleo Krueger DO, 10 mL at 08/25/20 0600 
  sodium chloride (NS) flush 5-40 mL, 5-40 mL, IntraVENous, PRN, Jetty Lofts, [employer] M, DO 
  acetaminophen (TYLENOL) tablet 650 mg, 650 mg, Oral, Q6H PRN, 650 mg at 08/21/20 1602 **OR** acetaminophen (TYLENOL) suppository 650 mg, 650 mg, Rectal, Q6H PRN, Jaylene Ferris, DO 
  polyethylene glycol (MIRALAX) packet 17 g, 17 g, Oral, DAILY PRN, Cleo Ferris,  
   promethazine (PHENERGAN) tablet 12.5 mg, 12.5 mg, Oral, Q6H PRN **OR** ondansetron (ZOFRAN) injection 4 mg, 4 mg, IntraVENous, Q6H PRN, JULY Keith, DO 
  Vancomycin - pharmacy to dose, , Other, Rx Dosing/Monitoring, JULY Keith, DO 
  glucose chewable tablet 16 g, 4 Tab, Oral, PRN, Tillman Peabody M, DO 
  glucagon (GLUCAGEN) injection 1 mg, 1 mg, IntraMUSCular, PRN, Tillman Peabody M, DO 
  dextrose 10% infusion 0-250 mL, 0-250 mL, IntraVENous, PRN, JULY Keith,  
  gabapentin (NEURONTIN) capsule 100 mg, 100 mg, Oral, TID PRN, Tillman Peabody M,  
  insulin regular (NOVOLIN R, HUMULIN R) injection, , SubCUTAneous, AC&HS, Tillman Peabody M, DO, 2 Units at 08/25/20 1244 Labs: 
Recent Results (from the past 24 hour(s)) GLUCOSE, POC Collection Time: 08/24/20  2:59 PM  
Result Value Ref Range Glucose (POC) 119 (H) 65 - 100 mg/dL Performed by Awilda Sanchez GLUCOSE, POC Collection Time: 08/24/20  8:53 PM  
Result Value Ref Range Glucose (POC) 146 (H) 65 - 100 mg/dL Performed by Luz Marina Sanchez CBC WITH AUTOMATED DIFF Collection Time: 08/25/20  3:31 AM  
Result Value Ref Range WBC 13.1 (H) 3.6 - 11.0 K/uL  
 RBC 3.20 (L) 3.80 - 5.20 M/uL HGB 9.2 (L) 11.5 - 16.0 g/dL HCT 30.8 (L) 35.0 - 47.0 % MCV 96.3 80.0 - 99.0 FL  
 MCH 28.8 26.0 - 34.0 PG  
 MCHC 29.9 (L) 30.0 - 36.5 g/dL  
 RDW 15.8 (H) 11.5 - 14.5 % PLATELET 385 (H) 342 - 400 K/uL NRBC 0.0 0  WBC ABSOLUTE NRBC 0.00 0.00 - 0.01 K/uL NEUTROPHILS 82 (H) 32 - 75 % LYMPHOCYTES 7 (L) 12 - 49 % MONOCYTES 8 5 - 13 % EOSINOPHILS 1 0 - 7 % BASOPHILS 1 0 - 1 % IMMATURE GRANULOCYTES 1 (H) 0.0 - 0.5 % ABS. NEUTROPHILS 10.9 (H) 1.8 - 8.0 K/UL  
 ABS. LYMPHOCYTES 0.9 0.8 - 3.5 K/UL  
 ABS. MONOCYTES 1.0 0.0 - 1.0 K/UL  
 ABS. EOSINOPHILS 0.1 0.0 - 0.4 K/UL  
 ABS. BASOPHILS 0.1 0.0 - 0.1 K/UL  
 ABS. IMM.  GRANS. 0.1 (H) 0.00 - 0.04 K/UL  
 DF SMEAR SCANNED    
 RBC COMMENTS ANISOCYTOSIS 1+ 
    
 RBC COMMENTS MACROCYTOSIS 1+ 
    
 RBC COMMENTS OVALOCYTES 1+ RBC COMMENTS POLYCHROMASIA 1+ 
    
 RBC COMMENTS MICROCYTOSIS 
2+ METABOLIC PANEL, BASIC Collection Time: 08/25/20  3:31 AM  
Result Value Ref Range Sodium 137 136 - 145 mmol/L Potassium 4.4 3.5 - 5.1 mmol/L Chloride 109 (H) 97 - 108 mmol/L  
 CO2 21 21 - 32 mmol/L Anion gap 7 5 - 15 mmol/L Glucose 112 (H) 65 - 100 mg/dL BUN 24 (H) 6 - 20 MG/DL Creatinine 1.47 (H) 0.55 - 1.02 MG/DL  
 BUN/Creatinine ratio 16 12 - 20 GFR est AA 44 (L) >60 ml/min/1.73m2 GFR est non-AA 36 (L) >60 ml/min/1.73m2 Calcium 8.5 8.5 - 10.1 MG/DL  
HIV 1/2 AG/AB, 4TH GENERATION,W RFLX CONFIRM Collection Time: 08/25/20  3:31 AM  
Result Value Ref Range HIV 1/2 Interpretation NONREACTIVE NR    
 HIV 1/2 result comment SEE NOTE    
VANCOMYCIN, RANDOM Collection Time: 08/25/20  3:31 AM  
Result Value Ref Range Vancomycin, random 18.6 UG/ML  
GLUCOSE, POC Collection Time: 08/25/20  8:43 AM  
Result Value Ref Range Glucose (POC) 129 (H) 65 - 100 mg/dL Performed by Radha Gross Micro:  
  Blood: 8/20/20 Specimen Information:  Blood   
     
Component  Value  Ref Range & Units  Status Special Requests:  NO SPECIAL REQUESTS     Preliminary Culture result:  NO GROWTH 4 DAYS Wound Sternoclavicular joint Tissue RIGHT STERNOCLAVICULAR JOINT A   
     
Component  Value  Ref Range & Units  Status Special Requests:  NO SPECIAL REQUESTS     Final   
GRAM STAIN  NO WBC'S SEEN     Final   
GRAM STAIN  NO ORGANISMS SEEN     Final   
Culture result: Abnormal        Final   
HEAVY STAPHYLOCOCCUS AUREUS Susceptibility Staphylococcus aureus BRITTON Ciprofloxacin ($)  <=0.5 ug/mL  S Clindamycin ($)  0.25 ug/mL  S Daptomycin ($$$$$)  0.25 ug/mL  S Doxycycline ($$)  <=0.5 ug/mL  S Erythromycin ($$$$)  <=0.25 ug/mL  S    
 Gentamicin ($)  <=0.5 ug/mL  S Levofloxacin ($)  <=0.12 ug/mL  S Linezolid ($$$$$)  2 ug/mL  S Moxifloxacin ($$$$)  <=0.25 ug/mL  S Oxacillin  <=0.25 ug/mL  S Rifampin ($$$$)  <=0.5 ug/mL  S1 Tetracycline  <=1 ug/mL  S Trimeth/Sulfa  <=10 ug/mL  S Vancomycin ($)  1 ug/mL  S Component  Value  Ref Range & Units  Status Special Requests:    Final   
RIGHT STERNOCLAVICULAR JOINT ABSCESS Culture result:  NO ANAEROBES ISOLATED     Final   
Result History Tissue RIGHT STERNOCLAVICULAR JOINT A   
     
Component  Value  Ref Range & Units  Status Special Requests:  NO SPECIAL REQUESTS     Final   
GRAM STAIN  NO WBC'S SEEN     Final   
GRAM STAIN  NO ORGANISMS SEEN     Final   
Culture result: Abnormal        Final   
HEAVY STAPHYLOCOCCUS AUREUS Susceptibility Staphylococcus aureus BRITTON Ciprofloxacin ($)  <=0.5 ug/mL  S Clindamycin ($)  0.25 ug/mL  S Daptomycin ($$$$$)  0.25 ug/mL  S Doxycycline ($$)  <=0.5 ug/mL  S Erythromycin ($$$$)  <=0.25 ug/mL  S Gentamicin ($)  <=0.5 ug/mL  S Levofloxacin ($)  <=0.12 ug/mL  S Linezolid ($$$$$)  2 ug/mL  S Moxifloxacin ($$$$)  <=0.25 ug/mL  S Oxacillin  <=0.25 ug/mL  S Rifampin ($$$$)  <=0.5 ug/mL  S1 Tetracycline  <=1 ug/mL  S Trimeth/Sulfa  <=10 ug/mL  S Vancomycin ($)  1 ug/mL  S Component  Value  Ref Range & Units  Status Special Requests:    Final   
RIGHT STERNOCLAVICULAR JOINT ABSCESS Culture result:  NO ANAEROBES ISOLATED     Final   
Result History CULTURE, ANAEROBIC on 8/22/2020 Imaging: 
CT chest 8/19/20 
  
FINDINGS: 
  
CHEST WALL: No mass or axillary lymphadenopathy. THYROID: No nodule. MEDIASTINUM: No mass or lymphadenopathy. ASHLYN: No mass or lymphadenopathy. THORACIC AORTA: No aneurysm. MAIN PULMONARY ARTERY: Normal in caliber. TRACHEA/BRONCHI: Patent. ESOPHAGUS: No wall thickening or dilatation. HEART: Coronary artery calcifications are present. PLEURA: No effusion or pneumothorax. LUNGS: No nodule, mass, or airspace disease. INCIDENTALLY IMAGED UPPER ABDOMEN: There is a right adrenal adenoma measuring 2.8 cm. BONES: There is inflammation involving the right sternoclavicular joint. There 
is no focal fluid collection. 
  
IMPRESSION IMPRESSION: 
Suspect septic arthritis of the right sternoclavicular joint. No drainable fluid 
collection. Assessment / Plan Ms. Larry Stewart is a 50-year-old lady with a history of diabetes, hypertension, A. 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 
who was transferred from [address] [place] [address] for right sternoclavicular joint osteomyelitis//septic joint. She underwent incision and debridement of the right sternoclavicular joint, resection of the middle third of the right clavicle and placement of wound VAC on 8/20/2020. Operative findings include \" jacinta purulence within the [place] joint which was eroded\" Her blood culture on 8/20/2020 was negative but wound cultures from [place] joint debridement were positive for MSSA She is currently on IV vancomycin She reports a history of myalgias and muscle issues while on daptomycin previously She lists an allergy to antibiotics including Bactrim as well as penicillin. She had hives with penicillin. Cephalosporin was attempted and she says she passed out almost and had no idea what was happening to her when she received cefepime in the past. 
 
1) sternoclavicular joint abscess, osteomyelitis History of MSSA bacteremia, pacer vegetation status post removal and endocarditis treatment in April to May 2020 Left foot osteomyelitis status post amputation/BKA May 2020 I discussed with the patient that vancomycin is  not the best medication for MSSA. However given her issues with antibiotics in the past to cephalosporins, penicillin and daptomycin,  we will continue with IV vancomycin as choices are limited Will plan on 6 weeks of antibiotics for sternoclavicular osteomyelitis Blood cx 8/20 negative, recommend  tte given risk for endocarditis, may need MIKE Renally dose vancomycin and monitor for nephrotoxicity which was discussed with the patient in detail Antibiotic side effects/adverse effects/toxicities discussed including gastrointestinal, renal, hematological , ability to lower siezure threshold, risk for C diff infection. Probiotics, daily yogurt encouraged. Check weekly labs for CBC with differential, BMP, ESR, CRP and vancomycin trough Vancomycin dosing to be adjusted by pharmacy for trough goal of 15-20. Vanc random level 18 today One other option is p.o. Zyvox but patient usually do not tolerate 6 weeks of Zyvox secondary to bone marrow suppression and also has drug drug interactions 2) A. fib 3) PARAG Renally dose vancomycin 4) DVT prophylaxis 5) screening hep C antibody negative in March 2020 Check HIV-patient verbally consented Thank you for the opportunity to participate in the care of this patient. Please contact with questions or concerns.    
 
Nidhi Valdez, DO  
2:49 PM

## 2020-08-25 NOTE — OP NOTES
[address] 
OPERATIVE REPORT Name:  Gregoria Guidry 
MR#:  [id number] :  1960 ACCOUNT #:  [de-identified] DATE OF SERVICE:  2020 PREOPERATIVE DIAGNOSIS:  Right sternoclavicular joint infection. POSTOPERATIVE DIAGNOSIS:  Right sternoclavicular joint infection. PROCEDURES PERFORMED: 
1. Debridement with resection of the manubrium including bone and muscle using electrocautery and bone rongeur. 2.  Right pectoralis advancement flap. SURGEON:  Ekaterina Crook MD 
 
ASSISTANT:  LORNA Riley, who helped with retraction, debridement of the bone, and wound closure. ANESTHESIA:  General endotracheal with a single-lumen tube. COMPLICATIONS:  None. SPECIMENS REMOVED:  Manubrium for Gram stain culture, AFB, and fungus. IMPLANTS:  None. ESTIMATED BLOOD LOSS:  50 mL. DRAINS:  Two YOANA drains. INDICATIONS:  Briefly, this is a 69-year-old female who originally had a sternoclavicular joint infection with resection of her proximal clavicle and sternoclavicular joint by Dr. Mauro Mendiola. A wound VAC was placed, and she was then taken to the operating room today for delayed closure. DESCRIPTION OF OPERATIVE PROCEDURE:  The patient was taken to the operating room and placed supine on the operating room table. After induction of general anesthesia, she was intubated with a single-lumen tube. Her arms were then tucked, and her anterior chest was then prepped and draped in the usual sterile fashion. We had previously removed the wound VAC. Upon exploration, we noted that there was no necrotic tissue; however, the articular surface of the manubrium remained prominent as well as needed to be further debrided. Using a bone rongeur, we removed the bone and sent it for culture. Using a rasp, we then completed the bone resection. Once this was completed, we then copiously irrigated the wound, and there was no evidence of any bleeding.   We then turned our attention to creating the pectoralis advancement flap. Using electrocautery, we then undermined the pectoralis off of the chest wall. The perforating vessels were taken with electrocautery. The patient had had previous breast surgery, I assume it was a reduction, and there was a large calcified blood vessel in the inframammary fold that was bleeding, and using clips, we controlled that. We then turned our attention to the anterior aspect, and using electrocautery, we removed the pectoralis muscles from the breast tissue. Once this was completed, we then rotated the pectoralis flap into the space and affixed it using PDS suture in a figure-of-eight fashion. Once this was completed, the skin and subcuticular tissue were approximated again using Vicryl suture. Skin was approximated with skin staples and nylon sutures. We placed 2 YOANA drains, one above and one below the muscles, and affixed those to the skin with nylon sutures. The medial drain was deep to the pectoralis muscle and the lateral drain was superior to the pectoralis muscle. The patient was extubated in the operating room and delivered to the PACU in stable condition. Erma Gaytan MD 
 
 
[id number] 
D:  08/24/2020 15:49 T:  08/24/2020 22:45 JOB #:  [id number]

## 2020-08-25 NOTE — PROGRESS NOTES
[address] [facility]  Hospitalist Group Hospitalist Progress Note Angie Ramos MD 
Answering service: [phone] OR [phone] from in house phone Date of Service:  2020 NAME:  Naomie Barclay :  1960 MRN:  [id number] Admission Summary: A 60year old female with past medical history HTN, DM Type II on insulin, Atrial fibrillation/flutter, CKD stage III, recent admission 2020-2020 for left foot cellulitis/OM s/p left BKA, bacteremia MSSA, endocarditis, Pacemaker vegetation s/p lead extraction, presenting to [facility] as a direct transfer from [facility] emergency department for thoracic surgery evaluation.  Patient developed right-sided chest pain/tenderness radiating to right shoulder.  Seen at urgent care and found to have atrial fibrillation with RVR. Sent to the emergency department for further evaluation.  In the ED, CT chest demonstrated septic arthritis of the right sternal clavicular joint and no abscess.  Given Merrem and vancomycin and transferred to [facility] 2020 
  
Interval history / Subjective:  
 
Complain pain at the right clavicular area, she is using her PCA, no left side chest pain Assessment & Plan:  
 
Sepsis secondary to septic right sternoclavicular joint and abscess  
-s/p I&D on   
- tachycardia, leukocytosis on poa  
-Recent complicated hospitalization with MSSA bacteremia/endocarditis - Incision and debridement of right sternoclavicular joint.  Resection of medial third of right clavicle.  Placement of wound VAC on  
- leucocytosis - resolved - blood cultures: NGTD 
- OR cx: MSSA 
- on iv vancomycin - pain meds - on fentanyl PCA pump , need to transition to po meds and IV meds prn on Monday after OR 
-COVID 19 test negative  
-SpO2 100 % on RA 
- Thoracic surgeon and ID are on board 
  
 Atrial fibrillation with RVR 
-rate controlled, off Cardizem drip, continue with oral diltiazem 
- HR still in 100's  
- coumadin will be restarted once cleared by thoracic surgery Hx of pacemaker lead vegetation and s/p removal of device 
-stable, continue cardiac monitoring 
  
PARAG on CKD stage III  
-creatinine improving 
- secondary to sepsis 
- on IVF  
- avoid nephrotoxins -monitor renal function 
  
T2DM 
-Hold long-acting, continue SSI. A1c 6.1, monitor finger stick glucose 
  
HTN  
- BP elevated likely due to pain. continue hydralazine, Cardizem and prn IV hydralzine, monitor BP 
  
Depression 
-stable, continue buspirone, Zoloft Code status:Full Code DVT prophylaxis:SCD Care Plan discussed with: Patient/Family, Nurse and  Anticipated Disposition: TBD Anticipated Discharge: Greater than 48 hours Hospital Problems  Date Reviewed: 8/24/2020 Codes Class Noted POA Disorder of sternoclavicular joint ICD-10-CM: M25.9 ICD-9-CM: 719.91  8/21/2020 Unknown * (Principal) Sepsis ([address] ICD-10-CM: A41.9 ICD-9-CM: 038.9, 995.91  8/20/2020 Yes Vital Signs:  
 Last 24hrs VS reviewed since prior progress note. Most recent are: 
Visit Vitals /79 (BP 1 Location: Right arm, BP Patient Position: At rest) Pulse (!) 104 Temp 98.2 °F (36.8 °C) Resp 19 Ht 5' 10\" (1.778 m) Wt 107.2 kg (236 lb 5.3 oz) SpO2 100% BMI 33.91 kg/m² Intake/Output Summary (Last 24 hours) at 8/25/2020 1025 Last data filed at 8/25/2020 6566 Gross per 24 hour Intake 600 ml Output 2475 ml Net -1875 ml Physical Examination:  
 
 
     
Constitutional:  No acute distress, cooperative, pleasant ENT:  Oral mucosa moist, oropharynx benign. Resp: 
Chest:  CTA bilaterally. No wheezing/rhonchi/rales. No accessory muscle use Right clavicular area dressed, drainage in place CV:  Regular rhythm, normal rate, no murmurs, gallops, rubs GI:  Soft, non distended, non tender. normoactive bowel sounds, no hepatosplenomegaly Musculoskeletal:  No edema, left BKA Neurologic:  Moves all extremities. AAOx3, CN II-XII reviewed Skin:  Good turgor, no rashes or ulcers Data Review:  
 Review and/or order of clinical lab test 
Review and/or order of tests in the radiology section of CPT Review and/or order of tests in the medicine section of CPT Labs:  
 
Recent Labs  
  08/25/20 0331 08/24/20 
0345 WBC 13.1* 5.9 HGB 9.2* 9.2* HCT 30.8* 31.3*  
* 528* Recent Labs  
  08/25/20 
0331 08/24/20 
0345 08/23/20 
0023  137 138  
K 4.4 4.5 4.1 * 110* 110* CO2 21 23 22 BUN 24* 24* 27* CREA 1.47* 1.57* 1.84* * 102* 106* CA 8.5 8.6 8.5 No results for input(s): ALT, AP, TBIL, TBILI, TP, ALB, GLOB, GGT, AML, LPSE in the last 72 hours. No lab exists for component: SGOT, GPT, AMYP, HLPSE No results for input(s): INR, PTP, APTT, INREXT in the last 72 hours. No results for input(s): FE, TIBC, PSAT, FERR in the last 72 hours. Lab Results Component Value Date/Time Folate 8.5 03/14/2020 02:49 PM  
  
No results for input(s): PH, PCO2, PO2 in the last 72 hours. No results for input(s): CPK, CKNDX, TROIQ in the last 72 hours. No lab exists for component: CPKMB Lab Results Component Value Date/Time Cholesterol, total 141 11/11/2019 08:47 AM  
 HDL Cholesterol 32 (L) 11/11/2019 08:47 AM  
 LDL, calculated 82 11/11/2019 08:47 AM  
 Triglyceride 137 11/11/2019 08:47 AM  
 
Lab Results Component Value Date/Time Glucose (POC) 129 (H) 08/25/2020 08:43 AM  
 Glucose (POC) 146 (H) 08/24/2020 08:53 PM  
 Glucose (POC) 119 (H) 08/24/2020 02:59 PM  
 Glucose (POC) 108 (H) 08/24/2020 08:14 AM  
 Glucose (POC) 132 (H) 08/23/2020 09:24 PM  
 
Lab Results Component Value Date/Time  Color YELLOW/STRAW 05/23/2020 02:35 PM  
 Appearance CLOUDY (A) 05/23/2020 02:35 PM  
 Specific gravity 1.009 05/23/2020 02:35 PM  
 pH (UA) 6.0 05/23/2020 02:35 PM  
 Protein Negative 05/23/2020 02:35 PM  
 Glucose Negative 05/23/2020 02:35 PM  
 Ketone Negative 05/23/2020 02:35 PM  
 Bilirubin Negative 05/23/2020 02:35 PM  
 Urobilinogen 0.2 05/23/2020 02:35 PM  
 Nitrites Positive (A) 05/23/2020 02:35 PM  
 Leukocyte Esterase LARGE (A) 05/23/2020 02:35 PM  
 Epithelial cells FEW 05/23/2020 02:35 PM  
 Bacteria 4+ (A) 05/23/2020 02:35 PM  
 WBC >100 (H) 05/23/2020 02:35 PM  
 RBC 0-5 05/23/2020 02:35 PM  
 
 
 
Medications Reviewed:  
 
Current Facility-Administered Medications Medication Dose Route Frequency  vancomycin (VANCOCIN) 1250 mg in  ml infusion  1,250 mg IntraVENous ONCE  
 sodium chloride (NS) flush 5-40 mL  5-40 mL IntraVENous Q8H  
 sodium chloride (NS) flush 5-40 mL  5-40 mL IntraVENous PRN  
 fentaNYL (PF) 1,500 mcg/30 mL PCA   IntraVENous TITRATE  naloxone (NARCAN) injection 0.1 mg  0.1 mg IntraVENous PRN  
 diphenhydrAMINE (BENADRYL) injection 12.5 mg  12.5 mg IntraVENous Q6H PRN  
 bisacodyL (DULCOLAX) tablet 10 mg  10 mg Oral DAILY  lactated Ringers infusion  25 mL/hr IntraVENous CONTINUOUS  
 0.9% sodium chloride infusion  20 mL/hr IntraVENous CONTINUOUS  
 hydrALAZINE (APRESOLINE) 20 mg/mL injection 20 mg  20 mg IntraVENous Q6H PRN  
 hydrALAZINE (APRESOLINE) tablet 100 mg  100 mg Oral TID  busPIRone (BUSPAR) tablet 10 mg  10 mg Oral BID  dilTIAZem ER (CARDIZEM CD) capsule 180 mg  180 mg Oral DAILY  sertraline (ZOLOFT) tablet 75 mg  75 mg Oral DAILY  sodium chloride (NS) flush 5-40 mL  5-40 mL IntraVENous Q8H  
 sodium chloride (NS) flush 5-40 mL  5-40 mL IntraVENous PRN  
 acetaminophen (TYLENOL) tablet 650 mg  650 mg Oral Q6H PRN Or  
 acetaminophen (TYLENOL) suppository 650 mg  650 mg Rectal Q6H PRN  polyethylene glycol (MIRALAX) packet 17 g  17 g Oral DAILY PRN  promethazine (PHENERGAN) tablet 12.5 mg  12.5 mg Oral Q6H PRN  
 Or  
 ondansetron (ZOFRAN) injection 4 mg  4 mg IntraVENous Q6H PRN  Vancomycin - pharmacy to dose   Other Rx Dosing/Monitoring  glucose chewable tablet 16 g  4 Tab Oral PRN  
 glucagon (GLUCAGEN) injection 1 mg  1 mg IntraMUSCular PRN  
 dextrose 10% infusion 0-250 mL  0-250 mL IntraVENous PRN  
 gabapentin (NEURONTIN) capsule 100 mg  100 mg Oral TID PRN  
 insulin regular (NOVOLIN R, HUMULIN R) injection   SubCUTAneous AC&HS  
 
______________________________________________________________________ EXPECTED LENGTH OF STAY: 3d 17h ACTUAL LENGTH OF STAY:          5 Jesse Jimenez MD

## 2020-08-25 NOTE — PROGRESS NOTES
Problem: Mobility Impaired (Adult and Pediatric) Goal: *Acute Goals and Plan of Care (Insert Text) Description: FUNCTIONAL STATUS PRIOR TO ADMISSION: Patient was modified independent using a rolling walker and and L BKA prosthesis for functional mobility. HOME SUPPORT PRIOR TO ADMISSION: The patient lived with family but did not require assist. 
 
Physical Therapy Goals Initiated 8/23/2020 1. Patient will move from supine to sit and sit to supine , scoot up and down, and roll side to side in bed with minimal assistance/contact guard assist within 7 day(s). 2.  Patient will transfer from bed to chair and chair to bed with minimal assistance/contact guard assist using the least restrictive device within 7 day(s). 3.  Patient will perform sit to stand with minimal assistance/contact guard assist within 7 day(s). 4.  Patient will ambulate with minimal assistance/contact guard assist for 10 feet with the least restrictive device within 7 day(s). Outcome: Progressing Towards Goal 
 
PHYSICAL THERAPY TREATMENT Patient: Erin Bhatt (2 y.o. female) Date: 8/25/2020 Diagnosis: Sepsis ([facility] [A41.9] Sepsis ([facility] Procedure(s) (LRB): 
RIGHT CHEST WOUND RE EXPLORATION, DELAYED CLOSURE (URGENT) (Right) 1 Day Post-Op Precautions: Fall, Contact(per thoracic, no restrictions aside from pain with RUE) Chart, physical therapy assessment, plan of care and goals were reviewed. ASSESSMENT Patient is motivated for progress but total activity tolerance was limited by right shoulder pain. Attempted supine to sit to left side initially but patient unable to clear her buttocks to scoot with maximum assist x2 and deferred d/t patient c/o pain. 2nd attempt to right side by hooking her RLE of the EOB and rolling from chair position. Patient able to clear her shoulders briefly with maximum assist x2 before requesting return supine again d/t pain. The patient prefers discharge home and reports discharging home following her BKA. She was ambulatory with a RW prior to admission, which requires WB through the right UE. Would recommend discharge to IP rehab unless pain control and her independence with functional mobility improves. Current Level of Function Impacting Discharge (mobility/balance): maximum assist x2 to transfer EOB with poor tolerance PLAN : 
Patient continues to benefit from skilled intervention to address the above impairments. Continue treatment per established plan of care. to address goals. Recommendation for discharge: (in order for the patient to meet his/her long term goals) Therapy 3 hours per day 5-7 days per week IF patient discharges home will need the following DME: patient owns DME required for discharge SUBJECTIVE:  
Patient stated I'm a tough lady. I went home after my amputation.  OBJECTIVE DATA SUMMARY:  
Critical Behavior: 
Neurologic State: Alert, Appropriate for age Orientation Level: Oriented X4 Cognition: Follows commands, Appropriate decision making Functional Mobility Training: 
Bed Mobility: 
Rolling: Maximum assistance;Assist x2 Supine to Sit: Maximum assistance;Assist x2;Bed Modified; Additional time Sit to Supine: Maximum assistance;Assist x2;Bed Modified Scooting: Maximum assistance;Assist x2 Transfers: 
  
  
     
  
     
  
  
  
  
Balance: 
Sitting: Impaired;High guard; With support Sitting - Static: Fair (occasional); Poor (constant support)(flutuated dep on p!) Sitting - Dynamic: Poor (constant support) Standing: (unable to assess) Pain Ratin/10 right shoulder with PCA Activity Tolerance:  
Fair Please refer to the flowsheet for vital signs taken during this treatment. After treatment patient left in no apparent distress:  
Supine in bed and Call bell within reach COMMUNICATION/COLLABORATION:  
 The patients plan of care was discussed with: Registered nurse. Kelvin Smoker, PT, DPT Time Calculation: 22 mins

## 2020-08-25 NOTE — PROGRESS NOTES
Problem: Self Care Deficits Care Plan (Adult) Goal: *Acute Goals and Plan of Care (Insert Text) Description:  
FUNCTIONAL STATUS PRIOR TO ADMISSION: Patient was MOD I for ADLs/iADLs. With L BKA. Using prosthetic for ADL related mobility with walker PTA. Reports she had been home about a month after rehab stay at [facility] prior to this admission. HOME SUPPORT: The patient lived with  but did not require assistance for basic ADLs Occupational Therapy Goals Initiated 8/25/2020 1. Patient will perform grooming seated EOB with moderate assistance  within 7 day(s). 2.  Patient will perform upper body dressing with minimal assistance/contact guard assist within 7 day(s). 3.  Patient will perform lower body dressing with moderate assistance  within 7 day(s). 4.  Patient will perform toilet transfers with moderate assistance  within 7 day(s). 5.  Patient will perform all aspects of toileting with moderate assistance  within 7 day(s). 6.  Patient will participate in upper extremity therapeutic exercise/activities with moderate assistance as tolerated for 15 minutes within 7 day(s). 7.  Patient will utilize energy conservation techniques during functional activities with verbal cues within 7 day(s). Outcome: Progressing Towards Goal 
  
OCCUPATIONAL THERAPY EVALUATION Patient: Clary Rubi (34 y.o. female) Date: 8/25/2020 Primary [facility]: [facility] [A41.9] Procedure(s) (LRB): 
RIGHT CHEST WOUND RE EXPLORATION, DELAYED CLOSURE (URGENT) (Right) 1 Day Post-Op Precautions: Fall, Contact(per thoracic, no restrictions aside from pain with RUE) ASSESSMENT Based on the objective data described below, the patient presents with significant RUE pain, decreased activity tolerance, decreased ADL related mobility, impaired functional use of RUE with recent chest exploration and partial clavicle removal. Participated in bed mobility in prep for ADLs to transfer from supine to sit with overall max A x 2 and modification of bed position. Progress largely limited by R shoulder pain at surgical sight and per pt, in axilla. Requiring up to max A for LB ADLs at this time. Pt has R arm sling which she was wearing upon entering room. Pt will benefit from OT during hospital stay to optimize functional outcomes. She has indicated her preference is to return home if possible, however based on presentation today, anticipate pt would greatly benefit from short term rehab stay prior to returning home. Pt aware of recommendations. Will continue to follow. Current Level of Function Impacting Discharge (ADLs/self-care): up to max A for ADLs Functional Outcome Measure: The patient scored Total: 15/100 on the Barthel Index outcome measure Other factors to consider for discharge: was MOD I from prosthetic level prior to admission. Patient will benefit from skilled therapy intervention to address the above noted impairments. PLAN : 
Recommendations and Planned Interventions: self care training, functional mobility training, therapeutic exercise, balance training, therapeutic activities, cognitive retraining, endurance activities, patient education, home safety training, and family training/education Frequency/Duration: Patient will be followed by occupational therapy 5 times a week to address goals. Recommendation for discharge: (in order for the patient to meet his/her long term goals) Therapy 3 hours per day 5-7 days per week This discharge recommendation: 
Has not yet been discussed the attending provider and/or case management IF patient discharges home will need the following DME: patient owns DME required for discharge and may benefit from further evaluation for more appropraite shoulder sling/support pending progress with therapy. SUBJECTIVE:  
Patient stated I feel like I'm stuck to this bed, I need to get up.  OBJECTIVE DATA SUMMARY:  
HISTORY:  
Past Medical History:  
Diagnosis Date Acquired lymphedema Right arm Arrhythmia Asthma Atrial fibrillation ([facility] [address] Dr. Luz Wharton and Dr. Martell Stephens  
 Atrial flutter [facility]) Cancer ([facility] [address] bilateral breast  
 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  
Procedure Laterality Date ABDOMEN SURGERY PROC UNLISTED    
 gastric bypass GASTRIC BYPASS,OBESE<150CM SAW-EN-Y  7/08  
 [facility]    
 HX BREAST RECONSTRUCTION Bilateral   
 HX CARPAL TUNNEL RELEASE    
 HX CERVICAL FUSION  1994 [address] [facility] [address] RIGHT MODIFIED RADICAL   
 HX MASTECTOMY Left   
 no lymphnode removal  
 HX MOHS PROCEDURES  1995  
 right HX ORTHOPAEDIC Right   
 knee surgery HX PACEMAKER    
 HX PACEMAKER    
 IR INSERT TUNL CVC W PORT OVER 5 YEARS    
 IR INSERT TUNL CVC W/O PORT OVER 5 YR  5/4/2020 IR REMOVE TUNL CVAD W/O PORT / PUMP  6/26/2020 [address] NEEDLE BIOPSY LIVER,W OTHR [address]  8.21.07  
 [place] [address] AND 1994 [place] RELOCATION OF SKIN POCKET FOR PACEMAKER N/A 4/24/2020 RELOCATE 2 [facility] performed by Chito Bach MD at [facility] CARDIAC CATH LAB  
 [place] RMVL TRANSVNS PM ELTRD 1 LEAD SYS ATR/VENTR N/A 4/24/2020  Remove Pacemaker Dual Leads performed by Chito Bach MD at [facility] CARDIAC CATH LAB  
 [place] RMVL TRANSVNS PM ELTRD 1 LEAD SYS ATR/VENTR N/A 4/27/2020 REMOVE PACEMAKER DUAL LEADS performed by Fawn Painting MD at [facility] CARDIAC CATH LAB  
 [place] TRABECULOPLASTY BY LASER SURGERY    
 US GUIDE ASP OVARIAN CYST ABD APPROACH  8.21.07  
 RIGHT Expanded or extensive additional review of patient history:  
 
Home Situation Home Environment: Private residence # Steps to Enter: 1 Rails to Enter: Yes Wheelchair Ramp: Yes One/Two Story Residence: One story Living Alone: No 
Support Systems: Spouse/Significant Other/Partner(and pet cats) Patient Expects to be Discharged to[de-identified] Private residence Current DME Used/Available at Home: Walker, rolling, Transfer bench, Raised toilet seat, Cane, straight Tub or Shower Type: Shower Hand dominance: Right EXAMINATION OF PERFORMANCE DEFICITS: 
Cognitive/Behavioral Status: 
Neurologic State: Alert; Appropriate for age Orientation Level: Oriented X4 Cognition: Follows commands; Appropriate decision making Skin: with some areas of scabbing. See nursing notes for details Edema: none observed Hearing: Auditory Auditory Impairment: None Vision/Perceptual:   
Tracking: Able to track stimulus in all quadrants w/o difficulty Range of Motion: 
 
AROM: Generally decreased, functional(except RUE limited by pain/post op) Strength: 
 
Strength: Generally decreased, functional(RUE limited by p!) Coordination: 
Coordination: Generally decreased, functional(except RUE) Fine Motor Skills-Upper: Left Intact; Right Impaired Gross Motor Skills-Upper: Left Intact; Right Impaired Tone & Sensation: 
 
Tone: Normal 
Sensation: Intact Balance: 
Sitting: Impaired;High guard; With support Sitting - Static: Fair (occasional); Poor (constant support)(flutuated dep on p!) Sitting - Dynamic: Poor (constant support) Standing: (unable to assess) Functional Mobility and Transfers for ADLs: 
Bed Mobility: 
Rolling: Maximum assistance;Assist x2 Supine to Sit: Maximum assistance;Assist x2;Bed Modified; Additional time Sit to Supine: Maximum assistance;Assist x2;Bed Modified Scooting: Maximum assistance;Assist x2 Transfers: ADL Assessment: 
Feeding: Setup Oral Facial Hygiene/Grooming: Minimum assistance Bathing: Maximum assistance Upper Body Dressing: Maximum assistance Lower Body Dressing: Maximum assistance Toileting: Maximum assistance ADL Intervention and task modifications: 
  
 
  
 
  
 
  
 
Upper Body Dressing Assistance Hospital Gown: Maximum assistance Lower Body Dressing Assistance Socks: Maximum assistance Functional Measure: 
Barthel Index: 
 
Bathin Bladder: 0(purewik due to immobility) Bowels: 10 
Groomin Dressin Feedin Mobility: 0 Stairs: 0 Toilet Use: 0 Transfer (Bed to Chair and Back): 0 Total: 15/100 The Barthel ADL Index: Guidelines 1. The index should be used as a record of what a patient does, not as a record of what a patient could do. 2. The main aim is to establish degree of independence from any help, physical or verbal, however minor and for whatever reason. 3. The need for supervision renders the patient not independent. 4. A patient's performance should be established using the best available evidence. Asking the patient, friends/relatives and nurses are the usual sources, but direct observation and common sense are also important. However direct testing is not needed. 5. Usually the patient's performance over the preceding 24-48 hours is important, but occasionally longer periods will be relevant. 6. Middle categories imply that the patient supplies over 50 per cent of the effort. 7. Use of aids to be independent is allowed. Mary Ferrari, Barthel, D.W. (4121). Functional evaluation: the Barthel Index. [address] [facility] (14)2. LIZY Bhatti, Jonna Escobar., Andry Bruno., [place], [address] (1999). Measuring the change indisability after inpatient rehabilitation; comparison of the responsiveness of the Barthel Index and Functional [place] Measure. Journal of Neurology, Neurosurgery, and Psychiatry, 66(4), 884-589. JOÃO Her, BLAISE Rosa, & Margarito Laguerre M.A. (2004.) Assessment of post-stroke quality of life in cost-effectiveness studies: The usefulness of the Barthel Index and the EuroQoL-5D. [facility], 13, 174-10 Occupational Therapy Evaluation Charge Determination History Examination Decision-Making MEDIUM Complexity : Expanded review of history including physical, cognitive and psychosocial  history  MEDIUM Complexity : 3-5 performance deficits relating to physical, cognitive , or psychosocial skils that result in activity limitations and / or participation restrictions MEDIUM Complexity : Patient may present with comorbidities that affect occupational performnce. Miniml to moderate modification of tasks or assistance (eg, physical or verbal ) with assesment(s) is necessary to enable patient to complete evaluation Based on the above components, the patient evaluation is determined to be of the following complexity level: MEDIUM Pain Rating: Not rated/endorsed significant p! In shoulder, on PCA Activity Tolerance:  
Poor, requires rest breaks, and primarily limited by pain Please refer to the flowsheet for vital signs taken during this treatment. After treatment patient left in no apparent distress:   
Supine in bed, Call bell within reach, and Side rails x 3 
 
COMMUNICATION/EDUCATION:  
The patients plan of care was discussed with: Physical therapist and Registered nurse. Home safety education was provided and the patient/caregiver indicated understanding. and Patient/family have participated as able in goal setting and plan of care. This patients plan of care is appropriate for delegation to ANGELIA. Thank you for this referral. 
Fantasma Vleoz, OT Time Calculation: 34 mins

## 2020-08-25 NOTE — PROGRESS NOTES
Thoracic Surgery Simple Progress Note Admit Date: 2020 POD: 1 Day Post-Op Procedure:  Procedure(s): RIGHT CHEST WOUND RE EXPLORATION, DELAYED CLOSURE (URGENT) Subjective:  
 
Patient has complaints: No significant medical complaints c/o pain, has PCA. Review of Systems: 
CARDIAC: positive for paroxysmal Afib, s/p pacer removal for lead infection/vegetation RESP: +R sternoclavicular joint infection NEURO:  negative INCISION: Clean, dry, and intact EXT: Denies new swelling or pain in the legs or calves. Objective:  
 
Blood pressure 163/72, pulse (!) 112, temperature 99.2 °F (37.3 °C), resp. rate 19, height 5' 10\" (1.778 m), weight 236 lb 5.3 oz (107.2 kg), SpO2 100 %. Temp (24hrs), Av.2 °F (36.8 °C), Min:97.8 °F (36.6 °C), Max:99.2 °F (37.3 °C) Hemodynamics PAP 
  CO 
  CI 
 
 0701 -  1900 In: -  
Out: 290 [Urine:250; Drains:40] 
 190 -  0700 In: 600 [I.V.:600] Out: 2385 [Urine:2250; Drains:85] EXAM: 
GENERAL: VSS, afrible, alert and cooperative HEART:  regular rate and rhythm LUNG: clear to auscultation bilaterally NEURO:  normal without focal findings 
mental status, speech normal, A&O x3 
INCISION: Clean, dry, and intact. YOANA #1 w 30cc/24 hrs output. YOANA #2 with 10cc/24 hrs output EXTREMITIES:No evidence of DVT seen on physical exam. 
Left BKA GI/: Abd soft, nontender, round  Voiding Labs: 
Recent Results (from the past 24 hour(s)) CULTURE, TISSUE W GRAM STAIN Collection Time: 20  1:28 PM  
 Specimen: Tissue STERNUM Result Value Ref Range Special Requests: MANUBRIUM   
 GRAM STAIN RARE WBCS SEEN    
 GRAM STAIN NO ORGANISMS SEEN Culture result: PENDING   
SAMPLES BEING HELD Collection Time: 20  1:28 PM  
Result Value Ref Range SAMPLES BEING HELD 1CUP MANUBRIUM   
 COMMENT TISSUE    
GLUCOSE, POC Collection Time: 20  2:59 PM  
Result Value Ref Range Glucose (POC) 119 (H) 65 - 100 mg/dL Performed by Janelle Guzman GLUCOSE, POC Collection Time: 08/24/20  8:53 PM  
Result Value Ref Range Glucose (POC) 146 (H) 65 - 100 mg/dL Performed by Erich Ramey CBC WITH AUTOMATED DIFF Collection Time: 08/25/20  3:31 AM  
Result Value Ref Range WBC 13.1 (H) 3.6 - 11.0 K/uL  
 RBC 3.20 (L) 3.80 - 5.20 M/uL HGB 9.2 (L) 11.5 - 16.0 g/dL HCT 30.8 (L) 35.0 - 47.0 % MCV 96.3 80.0 - 99.0 FL  
 MCH 28.8 26.0 - 34.0 PG  
 MCHC 29.9 (L) 30.0 - 36.5 g/dL  
 RDW 15.8 (H) 11.5 - 14.5 % PLATELET 718 (H) 624 - 400 K/uL NRBC 0.0 0  WBC ABSOLUTE NRBC 0.00 0.00 - 0.01 K/uL NEUTROPHILS 82 (H) 32 - 75 % LYMPHOCYTES 7 (L) 12 - 49 % MONOCYTES 8 5 - 13 % EOSINOPHILS 1 0 - 7 % BASOPHILS 1 0 - 1 % IMMATURE GRANULOCYTES 1 (H) 0.0 - 0.5 % ABS. NEUTROPHILS 10.9 (H) 1.8 - 8.0 K/UL  
 ABS. LYMPHOCYTES 0.9 0.8 - 3.5 K/UL  
 ABS. MONOCYTES 1.0 0.0 - 1.0 K/UL  
 ABS. EOSINOPHILS 0.1 0.0 - 0.4 K/UL  
 ABS. BASOPHILS 0.1 0.0 - 0.1 K/UL  
 ABS. IMM. GRANS. 0.1 (H) 0.00 - 0.04 K/UL  
 DF SMEAR SCANNED    
 RBC COMMENTS ANISOCYTOSIS 1+ 
    
 RBC COMMENTS MACROCYTOSIS 1+ 
    
 RBC COMMENTS OVALOCYTES 1+ RBC COMMENTS POLYCHROMASIA 1+ 
    
 RBC COMMENTS MICROCYTOSIS 
2+ METABOLIC PANEL, BASIC Collection Time: 08/25/20  3:31 AM  
Result Value Ref Range Sodium 137 136 - 145 mmol/L Potassium 4.4 3.5 - 5.1 mmol/L Chloride 109 (H) 97 - 108 mmol/L  
 CO2 21 21 - 32 mmol/L Anion gap 7 5 - 15 mmol/L Glucose 112 (H) 65 - 100 mg/dL BUN 24 (H) 6 - 20 MG/DL Creatinine 1.47 (H) 0.55 - 1.02 MG/DL  
 BUN/Creatinine ratio 16 12 - 20 GFR est AA 44 (L) >60 ml/min/1.73m2 GFR est non-AA 36 (L) >60 ml/min/1.73m2 Calcium 8.5 8.5 - 10.1 MG/DL  
HIV 1/2 AG/AB, 4TH GENERATION,W RFLX CONFIRM Collection Time: 08/25/20  3:31 AM  
Result Value Ref Range HIV 1/2 Interpretation NONREACTIVE NR    
 HIV 1/2 result comment SEE NOTE    
VANCOMYCIN, RANDOM Collection Time: 08/25/20  3:31 AM  
Result Value Ref Range Vancomycin, random 18.6 UG/ML  
GLUCOSE, POC Collection Time: 08/25/20  8:43 AM  
Result Value Ref Range Glucose (POC) 129 (H) 65 - 100 mg/dL Performed by Eddie Gonzales Assessment:  
No evidence of DVT. Principal Problem: 
  Sepsis ([address] (8/20/2020) Active Problems: 
  Disorder of sternoclavicular joint (8/21/2020) Plan/Recommendations:  
 
Continue PCA 
PT/OT with sling Daily woudn care & dressing change See orders Yanna Thomas 
8/25/2020

## 2020-08-25 NOTE — PROGRESS NOTES
Pt off unit from [id number]. 1930: NP Jona varner, pt placed on diabetic consist carb diet. Problem: Risk for Spread of Infection Goal: Prevent transmission of infectious organism to others Description: Prevent the transmission of infectious organisms to other patients, staff members, and visitors. Outcome: Progressing Towards Goal 
Note: Pt on contact precautions for ESBL. Problem: Pain Goal: *Control of Pain Outcome: Progressing Towards Goal 
Note: Pt's pain tolerable with fentalyl gtt. Bedside shift change report given to Amanda Arroyo RN (oncoming nurse) by Holly Herron RN (offgoing nurse). Report included the following information SBAR, Kardex, ED Summary, Procedure Summary, Intake/Output, MAR, Recent Results, and Cardiac Rhythm Afib .

## 2020-08-25 NOTE — PROGRESS NOTES
Problem: Risk for Spread of Infection Goal: Prevent transmission of infectious organism to others Description: Prevent the transmission of infectious organisms to other patients, staff members, and visitors. Outcome: Progressing Towards Goal 
Note: Patient is currently on modified contact for ESBL in her urine. Problem: Falls - Risk of 
Goal: *Absence of Falls Description: Document [facility] Fall Risk and appropriate interventions in the flowsheet. Outcome: Progressing Towards Goal 
Note: Fall Risk Interventions: 
Mobility Interventions: Communicate number of staff needed for ambulation/transfer, Patient to call before getting OOB Medication Interventions: Teach patient to arise slowly, Patient to call before getting OOB, Evaluate medications/consider consulting pharmacy Elimination Interventions: Call light in reach, Patient to call for help with toileting needs, Toileting schedule/hourly rounds Problem: Pain Goal: *Control of Pain Outcome: Progressing Towards Goal 
Note: Pain is currently on a Fentanyl PCA pump to control pain 
  
Problem: Nutrition Deficit Goal: *Optimize nutritional status Outcome: Progressing Towards Goal 
Note: Patient was NPO for surgery but now she is on a diabetic diet. Problem: Pressure Injury - Risk of 
Goal: *Prevention of pressure injury Description: Document Valdemar Scale and appropriate interventions in the flowsheet. Outcome: Progressing Towards Goal 
Note: Pressure Injury Interventions: 
Sensory Interventions: Keep linens dry and wrinkle-free, Minimize linen layers, Monitor skin under medical devices Moisture Interventions: Absorbent underpads, Apply protective barrier, creams and emollients, Internal/External urinary devices, Moisture barrier, Minimize layers Activity Interventions: Pressure redistribution bed/mattress(bed type) Mobility Interventions: Pressure redistribution bed/mattress (bed type), PT/OT evaluation Nutrition Interventions: Document food/fluid/supplement intake Friction and Shear Interventions: Minimize layers, Apply protective barrier, creams and emollients

## 2020-08-26 NOTE — PROGRESS NOTES
[address] [facility] Adult  Hospitalist Group Hospitalist Progress Note Satish Ocampo MD 
Answering service: [phone] OR [phone] from in house phone Date of Service:  2020 NAME:  Joseline Hernandez :  1960 MRN:  [id number] Admission Summary: A 60year old female with past medical history HTN, DM Type II on insulin, Atrial fibrillation/flutter, CKD stage III, recent admission 2020-2020 for left foot cellulitis/OM s/p left BKA, bacteremia MSSA, endocarditis, Pacemaker vegetation s/p lead extraction, presenting to [facility] as a direct transfer from [facility] emergency department for thoracic surgery evaluation.  Patient developed right-sided chest pain/tenderness radiating to right shoulder.  Seen at urgent care and found to have atrial fibrillation with RVR. Sent to the emergency department for further evaluation.  In the ED, CT chest demonstrated septic arthritis of the right sternal clavicular joint and no abscess.  Given Merrem and vancomycin and transferred to [facility] 2020 
  
Interval history / Subjective:  
 
Patient is working with PT when I saw her, PT team reporting that she is progressing well. Patient does not have any specific complaints and upon questioning she admits pain to the clavicular wound area. Assessment & Plan:  
 
Sepsis (presented with tachycardia leukocytosis) secondary to septic right sternoclavicular joint and abscess  
-s/p I&D on 7/15  
-Recent complicated hospitalization with MSSA bacteremia/endocarditis - Incision and debridement of right sternoclavicular joint.  Resection of medial third of right clavicle.  Placement of wound VAC on  
- leucocytosis - resolved - blood cultures: NGTD 
- OR cx: MSSA 
- on iv vancomycin - pain meds - on fentanyl PCA pump , need to transition to po meds and IV meds prn on Monday after OR 
-COVID 19 test negative  
-SpO2 100 % on RA 
- Thoracic surgeon and ID are on board 
  
Atrial fibrillation with RVR 
-rate controlled, off Cardizem drip, continue with oral diltiazem 
- HR still in 100's  
-Off Coumadin for surgery, start full dose Lovenox for now and will discuss with thoracic if okay to resume warfarin Hx of pacemaker lead vegetation and s/p removal of device 
-stable, continue cardiac monitoring 
  
PARAG on CKD stage III  
-creatinine improving 
- secondary to sepsis 
- on IVF  
- avoid nephrotoxins -monitor renal function 
  
T2DM 
-Hold long-acting, continue SSI. A1c 6.1, monitor finger stick glucose 
  
HTN  
- BP elevated likely due to pain. continue hydralazine, Cardizem and prn IV hydralzine, monitor BP 
  
Depression 
-stable, continue buspirone, Zoloft Code status:Full Code DVT prophylaxis:SCD Care Plan discussed with: Patient/Family, Nurse and  Anticipated Disposition: TBD Anticipated Discharge: Greater than 48 hours Hospital Problems  Date Reviewed: 8/24/2020 Codes Class Noted POA Disorder of sternoclavicular joint ICD-10-CM: M25.9 ICD-9-CM: 719.91  8/21/2020 Unknown * (Principal) [facility] ICD-10-CM: A41.9 ICD-9-CM: 038.9, 995.91  8/20/2020 Yes Vital Signs:  
 Last 24hrs VS reviewed since prior progress note. Most recent are: 
Visit Vitals /67 Pulse 97 Temp 98 °F (36.7 °C) Resp 14 Ht 5' 10\" (1.778 m) Wt 108 kg (238 lb) SpO2 98% BMI 34.15 kg/m² Intake/Output Summary (Last 24 hours) at 8/26/2020 1843 Last data filed at 8/26/2020 1536 Gross per 24 hour Intake 1297.08 ml Output 920 ml Net 377.08 ml Physical Examination:  
 
 
     
Constitutional:  No acute distress, cooperative, pleasant ENT:  Oral mucosa moist, oropharynx benign. Resp: 
Chest:  CTA bilaterally. No wheezing/rhonchi/rales. No accessory muscle use Right clavicular area dressed, drainage in place CV:  Regular rhythm, normal rate, no murmurs, gallops, rubs GI:  Soft, non distended, non tender. normoactive bowel sounds, no hepatosplenomegaly Musculoskeletal:  No edema, left BKA Neurologic:  Moves all extremities. AAOx3, CN II-XII reviewed Skin:  Good turgor, no rashes or ulcers Data Review:  
 Review and/or order of clinical lab test 
Review and/or order of tests in the radiology section of CPT Review and/or order of tests in the medicine section of CPT Labs:  
 
Recent Labs  
  08/26/20 
0800 08/25/20 
0331 WBC 10.4 13.1* HGB 9.0* 9.2* HCT 30.2* 30.8* * 422* Recent Labs  
  08/26/20 0800 08/25/20 0331 08/24/20 
0345  137 137  
K 4.4 4.4 4.5  
* 109* 110* CO2 22 21 23 BUN 23* 24* 24* CREA 1.46* 1.47* 1.57* * 112* 102* CA 8.6 8.5 8.6 No results for input(s): ALT, AP, TBIL, TBILI, TP, ALB, GLOB, GGT, AML, LPSE in the last 72 hours. No lab exists for component: SGOT, GPT, AMYP, HLPSE No results for input(s): INR, PTP, APTT, INREXT, INREXT in the last 72 hours. No results for input(s): FE, TIBC, PSAT, FERR in the last 72 hours. Lab Results Component Value Date/Time Folate 8.5 03/14/2020 02:49 PM  
  
No results for input(s): PH, PCO2, PO2 in the last 72 hours. No results for input(s): CPK, CKNDX, TROIQ in the last 72 hours. No lab exists for component: CPKMB Lab Results Component Value Date/Time Cholesterol, total 141 11/11/2019 08:47 AM  
 HDL Cholesterol 32 (L) 11/11/2019 08:47 AM  
 LDL, calculated 82 11/11/2019 08:47 AM  
 Triglyceride 137 11/11/2019 08:47 AM  
 
Lab Results Component Value Date/Time Glucose (POC) 136 (H) 08/26/2020 04:29 PM  
 Glucose (POC) 172 (H) 08/26/2020 11:37 AM  
 Glucose (POC) 119 (H) 08/26/2020 07:59 AM  
 Glucose (POC) 215 (H) 08/25/2020 09:08 PM  
 Glucose (POC) 133 (H) 08/25/2020 05:32 PM  
 
Lab Results Component Value Date/Time Color YELLOW/STRAW 05/23/2020 02:35 PM  
 Appearance CLOUDY (A) 05/23/2020 02:35 PM  
 Specific gravity 1.009 05/23/2020 02:35 PM  
 pH (UA) 6.0 05/23/2020 02:35 PM  
 Protein Negative 05/23/2020 02:35 PM  
 Glucose Negative 05/23/2020 02:35 PM  
 Ketone Negative 05/23/2020 02:35 PM  
 Bilirubin Negative 05/23/2020 02:35 PM  
 Urobilinogen 0.2 05/23/2020 02:35 PM  
 Nitrites Positive (A) 05/23/2020 02:35 PM  
 Leukocyte Esterase LARGE (A) 05/23/2020 02:35 PM  
 Epithelial cells FEW 05/23/2020 02:35 PM  
 Bacteria 4+ (A) 05/23/2020 02:35 PM  
 WBC >100 (H) 05/23/2020 02:35 PM  
 RBC 0-5 05/23/2020 02:35 PM  
 
 
 
Medications Reviewed:  
 
Current Facility-Administered Medications Medication Dose Route Frequency  enoxaparin (LOVENOX) injection 111 mg  111 mg SubCUTAneous Q12H  nystatin (MYCOSTATIN) 100,000 unit/gram cream   Topical BID  sodium chloride (NS) flush 5-40 mL  5-40 mL IntraVENous Q8H  
 sodium chloride (NS) flush 5-40 mL  5-40 mL IntraVENous PRN  
 fentaNYL (PF) 1,500 mcg/30 mL PCA   IntraVENous TITRATE  naloxone (NARCAN) injection 0.1 mg  0.1 mg IntraVENous PRN  
 diphenhydrAMINE (BENADRYL) injection 12.5 mg  12.5 mg IntraVENous Q6H PRN  
 bisacodyL (DULCOLAX) tablet 10 mg  10 mg Oral DAILY  0.9% sodium chloride infusion  20 mL/hr IntraVENous CONTINUOUS  
 hydrALAZINE (APRESOLINE) 20 mg/mL injection 20 mg  20 mg IntraVENous Q6H PRN  
 hydrALAZINE (APRESOLINE) tablet 100 mg  100 mg Oral TID  busPIRone (BUSPAR) tablet 10 mg  10 mg Oral BID  dilTIAZem ER (CARDIZEM CD) capsule 180 mg  180 mg Oral DAILY  sertraline (ZOLOFT) tablet 75 mg  75 mg Oral DAILY  sodium chloride (NS) flush 5-40 mL  5-40 mL IntraVENous Q8H  
 sodium chloride (NS) flush 5-40 mL  5-40 mL IntraVENous PRN  
 acetaminophen (TYLENOL) tablet 650 mg  650 mg Oral Q6H PRN  Or  
 acetaminophen (TYLENOL) suppository 650 mg  650 mg Rectal Q6H PRN  
  polyethylene glycol (MIRALAX) packet 17 g  17 g Oral DAILY PRN  promethazine (PHENERGAN) tablet 12.5 mg  12.5 mg Oral Q6H PRN Or  
 ondansetron (ZOFRAN) injection 4 mg  4 mg IntraVENous Q6H PRN  Vancomycin - pharmacy to dose   Other Rx Dosing/Monitoring  glucose chewable tablet 16 g  4 Tab Oral PRN  
 glucagon (GLUCAGEN) injection 1 mg  1 mg IntraMUSCular PRN  
 dextrose 10% infusion 0-250 mL  0-250 mL IntraVENous PRN  
 gabapentin (NEURONTIN) capsule 100 mg  100 mg Oral TID PRN  
 insulin regular (NOVOLIN R, HUMULIN R) injection   SubCUTAneous AC&HS  
 
______________________________________________________________________ EXPECTED LENGTH OF STAY: 3d 17h ACTUAL LENGTH OF STAY:          6 Shahram Palacios MD

## 2020-08-26 NOTE — PROGRESS NOTES
Transition Plan of Care RUR-29% Post op day 6 for incision and debridement of right sternoclavicular joint Recommendations are for inpatient rehab. Patient was in [facility] one month ago. Will discuss with attending regarding disposition. Juany Watkins RN CRM Ext [phone]

## 2020-08-26 NOTE — PROGRESS NOTES
Problem: Mobility Impaired (Adult and Pediatric) Goal: *Acute Goals and Plan of Care (Insert Text) Description: FUNCTIONAL STATUS PRIOR TO ADMISSION: Patient was modified independent using a rolling walker and and L BKA prosthesis for functional mobility. HOME SUPPORT PRIOR TO ADMISSION: The patient lived with family but did not require assist. 
 
Physical Therapy Goals Initiated 8/23/2020 1. Patient will move from supine to sit and sit to supine , scoot up and down, and roll side to side in bed with minimal assistance/contact guard assist within 7 day(s). 2.  Patient will transfer from bed to chair and chair to bed with minimal assistance/contact guard assist using the least restrictive device within 7 day(s). 3.  Patient will perform sit to stand with minimal assistance/contact guard assist within 7 day(s). 4.  Patient will ambulate with minimal assistance/contact guard assist for 10 feet with the least restrictive device within 7 day(s). Outcome: Progressing Towards Goal 
 
PHYSICAL THERAPY TREATMENT Patient: Clary Rubi (62 y.o. female) Date: 8/26/2020 Diagnosis: Sepsis ([address] [A41.9] Sepsis ([address] Procedure(s) (LRB): 
RIGHT CHEST WOUND RE EXPLORATION, DELAYED CLOSURE (URGENT) (Right) 2 Days Post-Op Precautions: Fall, Contact(per thoracic, no restrictions aside from pain with RUE) Chart, physical therapy assessment, plan of care and goals were reviewed. ASSESSMENT Patient continues to benefit from PT intervention and is making progress. Pain control was improved today allowing better patient participation and good progression of activity. Received with her left BKA , which allowed to place her prosthetic. She remained challenged with bed mobility requiring maximum assist x2 for supine  to sit but able to complete on the first attempt and with improved patient effort compared to last session. Scooting to EOB required maximum assist and cues for weight shifts.  Sit to stand from an elevated bed height required moderate assist x1-2 using a RW to stabilize. Side stepping completed to [facility] x3-4 steps with difficulty advancing her right LE and requiring moderate assist x2. Noted tachycardia with exertion up from 85 BPM resting in supine to 132 in standing. The patient remains highly motivated and participated well. She strongly prefers home but remains significantly below her baseline. Recommend discharge ot IP rehab when medically stable unless she is able to demonstrate considerable improvement in independence with bed mobility. Current Level of Function Impacting Discharge (mobility/balance): max x2 for supine to sit Other factors to consider for discharge: concern for her husbands ability to provide adequate physical assist. 
    
 
PLAN : 
Patient continues to benefit from skilled intervention to address the above impairments. Continue treatment per established plan of care. to address goals. Recommendation for discharge: (in order for the patient to meet his/her long term goals) Therapy 3 hours per day 5-7 days per week IF patient discharges home will need the following DME: patient owns DME required for discharge SUBJECTIVE:  
Patient stated the pain is better. Nothing that I can't deal with.  OBJECTIVE DATA SUMMARY:  
Critical Behavior: 
Neurologic State: Alert, Eyes open spontaneously, Appropriate for age Orientation Level: Oriented X4 Cognition: Appropriate decision making, Appropriate for age attention/concentration, Appropriate safety awareness, Follows commands Functional Mobility Training: 
Bed Mobility: 
Rolling: Maximum assistance;Assist x2 Supine to Sit: Maximum assistance;Assist x2; Additional time Sit to Supine: Maximum assistance;Assist x2 Scooting: Maximum assistance;Assist x1 Transfers: 
Sit to Stand: Moderate assistance;Assist x1;Assist x2 Stand to Sit: Moderate assistance;Assist x1;Assist x2 
     
  
     
  
  
 Balance: 
Sitting: Impaired Sitting - Static: Fair (occasional) Sitting - Dynamic: Fair (occasional) Standing: Impaired Standing - Static: Fair Standing - Dynamic : Fair Ambulation/Gait Training: 
 Side stepping to HOB using RW x3-4 steps requriing moderate assist x2 Stairs: Therapeutic Exercises:  
 
Pain Ratin/10 right shoulder Activity Tolerance:  
Fair Please refer to the flowsheet for vital signs taken during this treatment. After treatment patient left in no apparent distress:  
Supine in bed and Call bell within reach COMMUNICATION/COLLABORATION:  
The patients plan of care was discussed with: Registered nurse. Jose Gaspar, PT, DPT Time Calculation: 29 mins

## 2020-08-26 NOTE — PROGRESS NOTES
Thoracic Surgery Simple Progress Note Admit Date: 2020 POD: 2 Days Post-Op Procedure:  Procedure(s): RIGHT CHEST WOUND RE EXPLORATION, DELAYED CLOSURE (URGENT) Subjective:  
 
Patient has complaints: No significant medical complaints Worked with PT yesterday Review of Systems: 
CARDIAC: positive for paroxysmal Afib, s/p pacer removal for lead infection/vegetation RESP: +R sternoclavicular joint infection NEURO:  negative INCISION: Clean, dry, and intact EXT: Denies new swelling or pain in the legs or calves. Objective:  
 
Blood pressure 140/59, pulse (!) 105, temperature 98.9 °F (37.2 °C), resp. rate 22, height 5' 10\" (1.778 m), weight 238 lb (108 kg), SpO2 99 %. Temp (24hrs), Av.8 °F (37.1 °C), Min:98.2 °F (36.8 °C), Max:99.2 °F (37.3 °C) Hemodynamics PAP 
  CO 
  CI 
 
 07 -  1900 In: 577.1 [I.V.:577.1] Out: 600 [Urine:600] 1901 -  0700 In: 410 [I.V.:410] Out: 1370 [Urine:1250; Drains:120] EXAM: 
GENERAL: VSS, afrible, alert and cooperative HEART:  regular rhythm, tachy LUNG: clear to auscultation bilaterally O2 sat 99% on RA 
NEURO:  normal without focal findings 
mental status, speech normal, A&O x3 
INCISION: Clean, dry, and intact, overlying bulky dressing in place. YOANA drain #1 w 55ml/24 hrs output. YOANA #2 w 20ml/24 hrs output EXTREMITIES:No evidence of DVT seen on physical exam. +L BKA GI/: Abd soft, nontender. Voiding via buckley Labs: 
Recent Results (from the past 24 hour(s)) GLUCOSE, POC Collection Time: 20 11:43 AM  
Result Value Ref Range Glucose (POC) 155 (H) 65 - 100 mg/dL Performed by [employer] GLUCOSE, POC Collection Time: 20  5:32 PM  
Result Value Ref Range Glucose (POC) 133 (H) 65 - 100 mg/dL Performed by Fabián Loomis GLUCOSE, POC Collection Time: 20  9:08 PM  
Result Value Ref Range Glucose (POC) 215 (H) 65 - 100 mg/dL  Performed by Moreno Maddox RN   
 METABOLIC PANEL, BASIC Collection Time: 08/26/20  8:00 AM  
Result Value Ref Range Sodium 137 136 - 145 mmol/L Potassium 4.4 3.5 - 5.1 mmol/L Chloride 110 (H) 97 - 108 mmol/L  
 CO2 22 21 - 32 mmol/L Anion gap 5 5 - 15 mmol/L Glucose 116 (H) 65 - 100 mg/dL BUN 23 (H) 6 - 20 MG/DL Creatinine 1.46 (H) 0.55 - 1.02 MG/DL  
 BUN/Creatinine ratio 16 12 - 20 GFR est AA 44 (L) >60 ml/min/1.73m2 GFR est non-AA 37 (L) >60 ml/min/1.73m2 Calcium 8.6 8.5 - 10.1 MG/DL  
CBC W/O DIFF Collection Time: 08/26/20  8:00 AM  
Result Value Ref Range WBC 10.4 3.6 - 11.0 K/uL  
 RBC 3.14 (L) 3.80 - 5.20 M/uL HGB 9.0 (L) 11.5 - 16.0 g/dL HCT 30.2 (L) 35.0 - 47.0 % MCV 96.2 80.0 - 99.0 FL  
 MCH 28.7 26.0 - 34.0 PG  
 MCHC 29.8 (L) 30.0 - 36.5 g/dL  
 RDW 15.7 (H) 11.5 - 14.5 % PLATELET 062 (H) 315 - 400 K/uL MPV 9.7 8.9 - 12.9 FL  
 NRBC 0.0 0  WBC ABSOLUTE NRBC 0.00 0.00 - 0.01 K/uL Assessment:  
No evidence of DVT. Principal Problem: 
  Sepsis ([address] (8/20/2020) Active Problems: 
  Disorder of sternoclavicular joint (8/21/2020) Plan/Recommendations:  
Continue IV antibiotics Sling Daily wound care & dressing changes Daily PT See orders Yanna Joshi 
8/26/2020

## 2020-08-26 NOTE — PROGRESS NOTES
1930: Bedside and Verbal shift change report given to [address] (oncoming nurse) by Joshua Márquez and Adair Schwab RN (offgoing nurse). Report included the following information SBAR, Kardex, ED Summary, MAR, Recent Results and Cardiac Rhythm A Fib . Problem: Pain Goal: *Control of Pain Outcome: Progressing Towards Goal 
  
Problem: Sepsis: Day 6 Goal: *Oxygen saturation within defined limits Outcome: Progressing Towards Goal 
Goal: *Demonstrates progressive activity Outcome: Progressing Towards Goal 
Goal: Activity/Safety Outcome: Progressing Towards Goal 
Goal: Nutrition/Diet Outcome: Progressing Towards Goal 
Goal: Medications Outcome: Progressing Towards Goal

## 2020-08-26 NOTE — PROGRESS NOTES
Peripheral IV found saline locked under sling on R arm - pt has had previous lumpectomy on R side. Peripheral IV removed. Problem: Risk for Spread of Infection Goal: Prevent transmission of infectious organism to others Description: Prevent the transmission of infectious organisms to other patients, staff members, and visitors. Outcome: Progressing Towards Goal 
  
Problem: Falls - Risk of 
Goal: *Absence of Falls Description: Document Rebeca Downey Fall Risk and appropriate interventions in the flowsheet. Outcome: Progressing Towards Goal 
Note: Fall Risk Interventions: 
Mobility Interventions: Communicate number of staff needed for ambulation/transfer, OT consult for ADLs, Patient to call before getting OOB, PT Consult for mobility concerns, PT Consult for assist device competence, Strengthening exercises (ROM-active/passive), Utilize walker, cane, or other assistive device Medication Interventions: Assess postural VS orthostatic hypotension, Evaluate medications/consider consulting pharmacy, Patient to call before getting OOB, Teach patient to arise slowly Elimination Interventions: Call light in reach, Elevated toilet seat, Patient to call for help with toileting needs, Stay With Me (per policy), Toilet paper/wipes in reach, Toileting schedule/hourly rounds Problem: Pain Goal: *Control of Pain Outcome: Progressing Towards Goal 
  
Bedside shift change report given to Bishnu Gallegos RN (oncoming nurse) by Edward French RN (offgoing nurse). Report included the following information SBAR, Kardex, ED Summary, Procedure Summary, Intake/Output, MAR, Recent Results, and Cardiac Rhythm Afib .

## 2020-08-26 NOTE — PROGRESS NOTES
Pt had an uneventful night. Received meds/care according to orders. Vitals remained stable. Safety parameters maintained. Had to request the assistance of the techs and Charge Nurse as I was unable to successfully stick the patient for morning labs. Pt attempted 4 times total. Charge Nurse to attempt next. Charge Nurse was able to successfully draw patient's AM labs. Effective handoff was given bedside to Lanny Grullon RN and her preceptee. Fentanyl PCA pump verified, (please see apppropriate flowsheet).

## 2020-08-26 NOTE — PROGRESS NOTES
Infectious Disease Progress Note Subjective: Ms Edith Baptiste seen Pain in surgical incision area improved but present Tolerating antibiotics so far without diarrhea, gi upset ROS: 10 point ROS obtained and pertinent positives includeabove. All others negative. Objective: 
 
Vitals:  
Patient Vitals for the past 24 hrs: 
 Temp Pulse Resp BP SpO2  
08/26/20 1139 98.3 °F (36.8 °C) (!) 109 14 165/63 98 % 08/26/20 0953  (!) 111  136/89   
08/26/20 0800     98 % 08/26/20 0653 98.9 °F (37.2 °C) (!) 105 22 140/59 99 % 08/26/20 0332 98.5 °F (36.9 °C) 96 20 (!) 166/37   
08/26/20 0019 99.2 °F (37.3 °C) (!) 102 19 174/60 97 % 08/25/20 2000 98.6 °F (37 °C) 99 14 154/58 98 % 08/25/20 1532 98.2 °F (36.8 °C) 96 17 158/73 99 % Physical Exam: 
Gen: No apparent distress HEENT:   no scleral icterus, no thrush, CV: S1,2 heard regularly, no lower extremity edema  , + dressing chest , + drains, no erythema around dressing Lungs: Clear to auscultation bilaterally, Abdomen: soft, non tender,  
Skin: no rash , RLE chronic venous changes to skin Musculoskeletal:  No joint edema, erythema or tenderness noted RLE, + LLE BKA Medications: 
 
Current Facility-Administered Medications:  
  nystatin (MYCOSTATIN) 100,000 unit/gram cream, , Topical, BID, Elias Baumgarten, Dorothea Rainwater, MD 
  sodium chloride (NS) flush 5-40 mL, 5-40 mL, IntraVENous, Q8H, Brandy Layton PA, 10 mL at 08/26/20 0600 
  sodium chloride (NS) flush 5-40 mL, 5-40 mL, IntraVENous, PRN, LORNA Martinez, 10 mL at 08/26/20 0610 
  fentaNYL (PF) 1,500 mcg/30 mL PCA, , IntraVENous, TITRATE, LORNA Martinez 
  naloxone [facility]) injection 0.1 mg, 0.1 mg, IntraVENous, PRN, LORNA Martinez 
  diphenhydrAMINE (BENADRYL) injection 12.5 mg, 12.5 mg, IntraVENous, Q6H PRN, LORNA Martinez 
  bisacodyL (DULCOLAX) tablet 10 mg, 10 mg, Oral, DAILY, Zeus Bradley [place] 
   lactated Ringers infusion, 25 mL/hr, IntraVENous, CONTINUOUS, Erasmo Leonma, Last Rate: 25 mL/hr at 08/24/20 1708, 25 mL/hr at 08/24/20 1708 
  0.9% sodium chloride infusion, 20 mL/hr, IntraVENous, CONTINUOUS, Margaret Potter NP, Last Rate: 20 mL/hr at 08/25/20 0858, 20 mL/hr at 08/25/20 0970   hydrALAZINE (APRESOLINE) 20 mg/mL injection 20 mg, 20 mg, IntraVENous, Q6H PRN, Madala, Sushma, MD, 20 mg at 08/21/20 1148   hydrALAZINE (APRESOLINE) tablet 100 mg, 100 mg, Oral, TID, Mili Del Angel MD, 100 mg at 08/26/20 6383   busPIRone (BUSPAR) tablet 10 mg, 10 mg, Oral, BID, Demetriusnacio Severance M, DO, 10 mg at 08/26/20 1004   dilTIAZem ER (CARDIZEM CD) capsule 180 mg, 180 mg, Oral, DAILY, Demetrisu Severance M, DO, 180 mg at 08/26/20 9504   sertraline (ZOLOFT) tablet 75 mg, 75 mg, Oral, DAILY, Demetrius Severance M, DO, 75 mg at 08/26/20 0871   sodium chloride (NS) flush 5-40 mL, 5-40 mL, IntraVENous, Q8H, Cleo Krueger, DO, 10 mL at 08/26/20 0609 
  sodium chloride (NS) flush 5-40 mL, 5-40 mL, IntraVENous, PRN, Demetrius Severance M, DO 
  acetaminophen (TYLENOL) tablet 650 mg, 650 mg, Oral, Q6H PRN, 650 mg at 08/21/20 1602 **OR** acetaminophen (TYLENOL) suppository 650 mg, 650 mg, Rectal, Q6H PRN, Jaylene Olmos M, DO 
  polyethylene glycol (MIRALAX) packet 17 g, 17 g, Oral, DAILY PRN, JULY Olmos M, DO 
  promethazine (PHENERGAN) tablet 12.5 mg, 12.5 mg, Oral, Q6H PRN **OR** ondansetron (ZOFRAN) injection 4 mg, 4 mg, IntraVENous, Q6H PRN, Atanacio Severance M, DO 
  Vancomycin - pharmacy to dose, , Other, Rx Dosing/Monitoring, JULY Olmos M, DO 
  glucose chewable tablet 16 g, 4 Tab, Oral, PRN, JULY Olmos M, DO 
  glucagon (GLUCAGEN) injection 1 mg, 1 mg, IntraMUSCular, PRN, Atanacio Severance M,  
  dextrose 10% infusion 0-250 mL, 0-250 mL, IntraVENous, PRN, JULY Olmos M, DO 
  gabapentin (NEURONTIN) capsule 100 mg, 100 mg, Oral, TID PRN, Tu Borden DO 
   insulin regular (NOVOLIN R, HUMULIN R) injection, , SubCUTAneous, AC&HS, Kinga Sides, DO, 2 Units at 08/26/20 1156 Labs: 
Recent Results (from the past 24 hour(s)) GLUCOSE, POC Collection Time: 08/25/20  5:32 PM  
Result Value Ref Range Glucose (POC) 133 (H) 65 - 100 mg/dL Performed by Shannan Zavala GLUCOSE, POC Collection Time: 08/25/20  9:08 PM  
Result Value Ref Range Glucose (POC) 215 (H) 65 - 100 mg/dL Performed by Neida Rabago RN   
METABOLIC PANEL, BASIC Collection Time: 08/26/20  8:00 AM  
Result Value Ref Range Sodium 137 136 - 145 mmol/L Potassium 4.4 3.5 - 5.1 mmol/L Chloride 110 (H) 97 - 108 mmol/L  
 CO2 22 21 - 32 mmol/L Anion gap 5 5 - 15 mmol/L Glucose 116 (H) 65 - 100 mg/dL BUN 23 (H) 6 - 20 MG/DL Creatinine 1.46 (H) 0.55 - 1.02 MG/DL  
 BUN/Creatinine ratio 16 12 - 20 GFR est AA 44 (L) >60 ml/min/1.73m2 GFR est non-AA 37 (L) >60 ml/min/1.73m2 Calcium 8.6 8.5 - 10.1 MG/DL  
CBC W/O DIFF Collection Time: 08/26/20  8:00 AM  
Result Value Ref Range WBC 10.4 3.6 - 11.0 K/uL  
 RBC 3.14 (L) 3.80 - 5.20 M/uL HGB 9.0 (L) 11.5 - 16.0 g/dL HCT 30.2 (L) 35.0 - 47.0 % MCV 96.2 80.0 - 99.0 FL  
 MCH 28.7 26.0 - 34.0 PG  
 MCHC 29.8 (L) 30.0 - 36.5 g/dL  
 RDW 15.7 (H) 11.5 - 14.5 % PLATELET 722 (H) 923 - 400 K/uL MPV 9.7 8.9 - 12.9 FL  
 NRBC 0.0 0  WBC ABSOLUTE NRBC 0.00 0.00 - 0.01 K/uL GLUCOSE, POC Collection Time: 08/26/20 11:37 AM  
Result Value Ref Range Glucose (POC) 172 (H) 65 - 100 mg/dL Performed by Zari Chan. Micro:  
  Blood: 8/20/20 Specimen Information:  Blood   
     
Component  Value  Ref Range & Units  Status Special Requests:  NO SPECIAL REQUESTS     Preliminary Culture result:  NO GROWTH 4 DAYS Wound Sternoclavicular joint Tissue RIGHT STERNOCLAVICULAR JOINT A   
     
Component  Value  Ref Range & Units  Status Special Requests:  NO SPECIAL REQUESTS     Final   
GRAM STAIN  NO WBC'S SEEN     Final   
GRAM STAIN  NO ORGANISMS SEEN     Final   
Culture result: Abnormal        Final   
HEAVY STAPHYLOCOCCUS AUREUS Susceptibility Staphylococcus aureus BRITTON Ciprofloxacin ($)  <=0.5 ug/mL  S Clindamycin ($)  0.25 ug/mL  S Daptomycin ($$$$$)  0.25 ug/mL  S Doxycycline ($$)  <=0.5 ug/mL  S Erythromycin ($$$$)  <=0.25 ug/mL  S Gentamicin ($)  <=0.5 ug/mL  S Levofloxacin ($)  <=0.12 ug/mL  S Linezolid ($$$$$)  2 ug/mL  S Moxifloxacin ($$$$)  <=0.25 ug/mL  S Oxacillin  <=0.25 ug/mL  S Rifampin ($$$$)  <=0.5 ug/mL  S1 Tetracycline  <=1 ug/mL  S Trimeth/Sulfa  <=10 ug/mL  S Vancomycin ($)  1 ug/mL  S Component  Value  Ref Range & Units  Status Special Requests:    Final   
RIGHT STERNOCLAVICULAR JOINT ABSCESS Culture result:  NO ANAEROBES ISOLATED     Final   
Result History Tissue RIGHT STERNOCLAVICULAR JOINT A   
     
Component  Value  Ref Range & Units  Status Special Requests:  NO SPECIAL REQUESTS     Final   
GRAM STAIN  NO WBC'S SEEN     Final   
GRAM STAIN  NO ORGANISMS SEEN     Final   
Culture result: Abnormal        Final   
HEAVY STAPHYLOCOCCUS AUREUS Susceptibility Staphylococcus aureus BRITTON Ciprofloxacin ($)  <=0.5 ug/mL  S Clindamycin ($)  0.25 ug/mL  S Daptomycin ($$$$$)  0.25 ug/mL  S Doxycycline ($$)  <=0.5 ug/mL  S Erythromycin ($$$$)  <=0.25 ug/mL  S Gentamicin ($)  <=0.5 ug/mL  S Levofloxacin ($)  <=0.12 ug/mL  S Linezolid ($$$$$)  2 ug/mL  S Moxifloxacin ($$$$)  <=0.25 ug/mL  S Oxacillin  <=0.25 ug/mL  S Rifampin ($$$$)  <=0.5 ug/mL  S1 Tetracycline  <=1 ug/mL  S Trimeth/Sulfa  <=10 ug/mL  S Vancomycin ($)  1 ug/mL  S Component  Value  Ref Range & Units  Status Special Requests:    Final   
RIGHT STERNOCLAVICULAR JOINT ABSCESS   
 Culture result:  NO ANAEROBES ISOLATED     Final   
Result History CULTURE, ANAEROBIC on 8/22/2020 Imaging: 
CT chest 8/19/20 
  
FINDINGS: 
  
CHEST WALL: No mass or axillary lymphadenopathy. THYROID: No nodule. MEDIASTINUM: No mass or lymphadenopathy. ASHLYN: No mass or lymphadenopathy. THORACIC AORTA: No aneurysm. MAIN PULMONARY ARTERY: Normal in caliber. TRACHEA/BRONCHI: Patent. ESOPHAGUS: No wall thickening or dilatation. HEART: Coronary artery calcifications are present. PLEURA: No effusion or pneumothorax. LUNGS: No nodule, mass, or airspace disease. INCIDENTALLY IMAGED UPPER ABDOMEN: There is a right adrenal adenoma measuring 2.8 cm. BONES: There is inflammation involving the right sternoclavicular joint. There 
is no focal fluid collection. 
  
IMPRESSION IMPRESSION: 
Suspect septic arthritis of the right sternoclavicular joint. No drainable fluid 
collection. Assessment / Plan Ms. Blanka Ramirez is a 79-year-old lady with a history of diabetes, hypertension, A. 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 
who was transferred from [address] [place] [address] for right sternoclavicular joint osteomyelitis//septic joint. She underwent incision and debridement of the right sternoclavicular joint, resection of the middle third of the right clavicle and placement of wound VAC on 8/20/2020. Operative findings include \" jacinta purulence within the [facility] joint which was eroded\" Her blood culture on 8/20/2020 was negative but wound cultures from [facility] joint debridement were positive for MSSA She is currently on IV vancomycin She reports a history of myalgias and muscle issues while on daptomycin previously She lists an allergy to antibiotics including Bactrim as well as penicillin. She had hives with penicillin. Cephalosporin was attempted and she says she passed out almost and had no idea what was happening to her when she received cefepime in the past. 
 
1) sternoclavicular joint abscess, osteomyelitis History of MSSA bacteremia, pacer vegetation status post removal and endocarditis treatment in April to May 2020 Left foot osteomyelitis status post amputation/BKA May 2020 I discussed with the patient that vancomycin is  not the best medication for MSSA. However given her issues with antibiotics in the past to cephalosporins, penicillin and daptomycin,  we will continue with IV vancomycin as choices are limited Will plan on 6 weeks of antibiotics for sternoclavicular osteomyelitis till 10/5/20 check TTE Renally dose vancomycin by pharmacy for trough goal of 15-20 . Antibiotic side effects/adverse effects/toxicities discussed including gastrointestinal, renal, hematological , ability to lower siezure threshold, risk for C diff infection. Probiotics, daily yogurt encouraged. Check weekly labs for CBC with differential, BMP, ESR, CRP and vancomycin trough Vancomycin dosing to be adjusted by pharmacy for trough goal of 15-20. Vanc random level 18 today One other option is p.o. Zyvox but patient usually do not tolerate 6 weeks of Zyvox secondary to bone marrow suppression and also has drug drug interactions . May consider later in treatment course if unable to tolerate Vancomycin IV 
 
 
 
 
2) A. fib 3) PARAG Renally dose vancomycin 4) DVT prophylaxis 5) screening hep C antibody negative in March 2020 Check HIV-patient verbally consented Thank you for the opportunity to participate in the care of this patient. Please contact with questions or concerns.    
 
Jaye Gruber,   
1:34 PM

## 2020-08-27 NOTE — PROGRESS NOTES
Infectious Disease Progress Note Subjective: Ms Reji Zhu seen 
 + pain Tolerating antibiotics so far without diarrhea /gi upset ROS: 10 point ROS obtained and pertinent positives includeabove. All others negative. Objective: 
 
Vitals:  
Patient Vitals for the past 24 hrs: 
 Temp Pulse Resp BP SpO2  
08/27/20 0900  (!) 111  147/48   
08/27/20 0705 98.6 °F (37 °C) 88 17 160/50 99 % 08/26/20 2259 98.8 °F (37.1 °C) (!) 106 11 174/61 99 % 08/26/20 2143    141/51   
08/26/20 1916 98.2 °F (36.8 °C) (!) 111 17 (!) 144/33 99 % 08/26/20 1655  97  174/67   
08/26/20 1536 98 °F (36.7 °C) 92 14 160/57 98 % 08/26/20 1139 98.3 °F (36.8 °C) (!) 109 14 165/63 98 % Physical Exam: 
Gen: No apparent distress HEENT:   no scleral icterus, no thrush, CV: S1,2 heard regularly, no lower extremity edema  , + dressing chest ,  
Lungs: Clear to auscultation bilaterally, Abdomen: soft, non tender,  
Skin: no rash , RLE chronic venous changes to skin Musculoskeletal:  No joint edema, erythema or tenderness noted RLE, + LLE BKA Medications: 
 
Current Facility-Administered Medications:  
  enoxaparin (LOVENOX) injection 111 mg, 111 mg, SubCUTAneous, Q12H, Sierra XJMWRBLINCOLN BARRERA MD, 111 mg at 08/27/20 3499   nystatin (MYCOSTATIN) 100,000 unit/gram cream, , Topical, BID, [facility], Jacinda MARCIAL MD 
  sodium chloride (NS) flush 5-40 mL, 5-40 mL, IntraVENous, Q8H, Yanna Loomis, 10 mL at 08/27/20 1104   sodium chloride (NS) flush 5-40 mL, 5-40 mL, IntraVENous, PRN, LORNA Loomis, 10 mL at 08/27/20 8691   fentaNYL (PF) 1,500 mcg/30 mL PCA, , IntraVENous, TITRATE, Lashay Dietz PA  naloxone [facility]) injection 0.1 mg, 0.1 mg, IntraVENous, PRN, LORNA Loomis 
  diphenhydrAMINE (BENADRYL) injection 12.5 mg, 12.5 mg, IntraVENous, Q6H PRN, LORNA Loomis 
  bisacodyL (DULCOLAX) tablet 10 mg, 10 mg, Oral, DAILY, Yanna Loomis 
   0.9% sodium chloride infusion, 20 mL/hr, IntraVENous, CONTINUOUS, White, Jes Ceballos, NP, Last Rate: 20 mL/hr at 08/27/20 0651, 20 mL/hr at 08/27/20 5783   hydrALAZINE (APRESOLINE) 20 mg/mL injection 20 mg, 20 mg, IntraVENous, Q6H PRN, Madala, Sushma, MD, 20 mg at 08/21/20 1148   hydrALAZINE (APRESOLINE) tablet 100 mg, 100 mg, Oral, TID, Angela Monroy MD, 100 mg at 08/27/20 9416   busPIRone (BUSPAR) tablet 10 mg, 10 mg, Oral, BID, Dolores GRIMES, DO, 10 mg at 08/27/20 0111   dilTIAZem ER (CARDIZEM CD) capsule 180 mg, 180 mg, Oral, DAILY, Dolores GRIMES, DO, 180 mg at 08/27/20 0900   sertraline (ZOLOFT) tablet 75 mg, 75 mg, Oral, DAILY, Dolores GRIMES, DO, 75 mg at 08/27/20 1281   sodium chloride (NS) flush 5-40 mL, 5-40 mL, IntraVENous, Q8H, Cleo Krueger DO, 10 mL at 08/27/20 9662   sodium chloride (NS) flush 5-40 mL, 5-40 mL, IntraVENous, PRN, Dolores GRIMES, DO 
  acetaminophen (TYLENOL) tablet 650 mg, 650 mg, Oral, Q6H PRN, 650 mg at 08/21/20 1602 **OR** acetaminophen (TYLENOL) suppository 650 mg, 650 mg, Rectal, Q6H PRN, Jaylene Rush, DO 
  polyethylene glycol (MIRALAX) packet 17 g, 17 g, Oral, DAILY PRN, Sebas [employer] M, DO 
  promethazine (PHENERGAN) tablet 12.5 mg, 12.5 mg, Oral, Q6H PRN **OR** ondansetron (ZOFRAN) injection 4 mg, 4 mg, IntraVENous, Q6H PRN, Dolores GRIMES DO 
  Vancomycin - pharmacy to dose, , Other, Rx Dosing/Monitoring, Sebas Majestic, [employer] M, DO 
  glucose chewable tablet 16 g, 4 Tab, Oral, PRN, JULY [employer] M, DO 
  glucagon (GLUCAGEN) injection 1 mg, 1 mg, IntraMUSCular, PRN, Dolores GRIMES,  
  dextrose 10% infusion 0-250 mL, 0-250 mL, IntraVENous, PRN, Sebas [employer] M, DO 
  gabapentin (NEURONTIN) capsule 100 mg, 100 mg, Oral, TID PRN, Dolores GRIMES,  
  insulin regular (NOVOLIN R, HUMULIN R) injection, , SubCUTAneous, AC&HS, Dolores GRIMES, DO, Stopped at 08/26/20 1630 Labs: 
Recent Results (from the past 24 hour(s)) GLUCOSE, POC Collection Time: 08/26/20 11:37 AM  
Result Value Ref Range Glucose (POC) 172 (H) 65 - 100 mg/dL Performed by [email or web address] GLUCOSE, POC Collection Time: 08/26/20  4:29 PM  
Result Value Ref Range Glucose (POC) 136 (H) 65 - 100 mg/dL Performed by [email or web address] GLUCOSE, POC Collection Time: 08/26/20  9:16 PM  
Result Value Ref Range Glucose (POC) 143 (H) 65 - 100 mg/dL Performed by Hassel Ellington METABOLIC PANEL, BASIC Collection Time: 08/27/20  5:50 AM  
Result Value Ref Range Sodium 137 136 - 145 mmol/L Potassium 4.8 3.5 - 5.1 mmol/L Chloride 109 (H) 97 - 108 mmol/L  
 CO2 21 21 - 32 mmol/L Anion gap 7 5 - 15 mmol/L Glucose 121 (H) 65 - 100 mg/dL BUN 23 (H) 6 - 20 MG/DL Creatinine 1.34 (H) 0.55 - 1.02 MG/DL  
 BUN/Creatinine ratio 17 12 - 20 GFR est AA 49 (L) >60 ml/min/1.73m2 GFR est non-AA 40 (L) >60 ml/min/1.73m2 Calcium 8.5 8.5 - 10.1 MG/DL  
CBC WITH AUTOMATED DIFF Collection Time: 08/27/20  5:50 AM  
Result Value Ref Range WBC 9.4 3.6 - 11.0 K/uL  
 RBC 3.14 (L) 3.80 - 5.20 M/uL HGB 9.2 (L) 11.5 - 16.0 g/dL HCT 30.6 (L) 35.0 - 47.0 % MCV 97.5 80.0 - 99.0 FL  
 MCH 29.3 26.0 - 34.0 PG  
 MCHC 30.1 30.0 - 36.5 g/dL  
 RDW 15.7 (H) 11.5 - 14.5 % PLATELET 534 (H) 404 - 400 K/uL MPV 10.8 8.9 - 12.9 FL  
 NRBC 0.0 0  WBC ABSOLUTE NRBC 0.00 0.00 - 0.01 K/uL NEUTROPHILS 77 (H) 32 - 75 % LYMPHOCYTES 10 (L) 12 - 49 % MONOCYTES 9 5 - 13 % EOSINOPHILS 2 0 - 7 % BASOPHILS 1 0 - 1 % IMMATURE GRANULOCYTES 1 (H) 0.0 - 0.5 % ABS. NEUTROPHILS 7.3 1.8 - 8.0 K/UL  
 ABS. LYMPHOCYTES 1.0 0.8 - 3.5 K/UL  
 ABS. MONOCYTES 0.8 0.0 - 1.0 K/UL  
 ABS. EOSINOPHILS 0.2 0.0 - 0.4 K/UL  
 ABS. BASOPHILS 0.1 0.0 - 0.1 K/UL  
 ABS. IMM. GRANS. 0.1 (H) 0.00 - 0.04 K/UL  
 DF AUTOMATED    
GLUCOSE, POC Collection Time: 08/27/20  8:13 AM  
Result Value Ref Range Glucose (POC) 114 (H) 65 - 100 mg/dL Performed by Raul No Micro:  
 
Wound 8/25/20 Specimen Information:  Tissue STERNUM   
     
Component  Value  Ref Range & Units  Status Special Requests:  MANUBRIUM   Final   
GRAM STAIN  RARE WBCS SEEN     Final   
GRAM STAIN  NO ORGANISMS SEEN     Final   
Culture result: Abnormal        Final   
LIGHT STAPHYLOCOCCUS AUREUS Susceptibility Staphylococcus aureus BRITTON Ciprofloxacin ($)  <=0.5 ug/mL  S Clindamycin ($)  0.25 ug/mL  S Daptomycin ($$$$$)  0.25 ug/mL  S Doxycycline ($$)  <=0.5 ug/mL  S Erythromycin ($$$$)  <=0.25 ug/mL  S Gentamicin ($)  <=0.5 ug/mL  S Levofloxacin ($)  <=0.12 ug/mL  S Linezolid ($$$$$)  2 ug/mL  S Moxifloxacin ($$$$)  <=0.25 ug/mL  S Oxacillin  <=0.25 ug/mL  S Rifampin ($$$$)  <=0.5 ug/mL  S1 Tetracycline  <=1 ug/mL  S Trimeth/Sulfa  <=10 ug/mL  S Vancomycin ($)  1 ug/mL  S Blood: 8/20/20 Specimen Information:  Blood   
     
Component  Value  Ref Range & Units  Status Special Requests:  NO SPECIAL REQUESTS     Preliminary Culture result:  NO GROWTH 4 DAYS Wound Sternoclavicular joint Tissue RIGHT STERNOCLAVICULAR JOINT A   
     
Component  Value  Ref Range & Units  Status Special Requests:  NO SPECIAL REQUESTS     Final   
GRAM STAIN  NO WBC'S SEEN     Final   
GRAM STAIN  NO ORGANISMS SEEN     Final   
Culture result: Abnormal        Final   
HEAVY STAPHYLOCOCCUS AUREUS Susceptibility Staphylococcus aureus BRITTON Ciprofloxacin ($)  <=0.5 ug/mL  S Clindamycin ($)  0.25 ug/mL  S Daptomycin ($$$$$)  0.25 ug/mL  S Doxycycline ($$)  <=0.5 ug/mL  S Erythromycin ($$$$)  <=0.25 ug/mL  S Gentamicin ($)  <=0.5 ug/mL  S Levofloxacin ($)  <=0.12 ug/mL  S Linezolid ($$$$$)  2 ug/mL  S Moxifloxacin ($$$$)  <=0.25 ug/mL  S Oxacillin  <=0.25 ug/mL  S    
 Rifampin ($$$$)  <=0.5 ug/mL  S1 Tetracycline  <=1 ug/mL  S Trimeth/Sulfa  <=10 ug/mL  S Vancomycin ($)  1 ug/mL  S Component  Value  Ref Range & Units  Status Special Requests:    Final   
RIGHT STERNOCLAVICULAR JOINT ABSCESS Culture result:  NO ANAEROBES ISOLATED     Final   
Result History Tissue RIGHT STERNOCLAVICULAR JOINT A   
     
Component  Value  Ref Range & Units  Status Special Requests:  NO SPECIAL REQUESTS     Final   
GRAM STAIN  NO WBC'S SEEN     Final   
GRAM STAIN  NO ORGANISMS SEEN     Final   
Culture result: Abnormal        Final   
HEAVY STAPHYLOCOCCUS AUREUS Susceptibility Staphylococcus aureus BRITTON Ciprofloxacin ($)  <=0.5 ug/mL  S Clindamycin ($)  0.25 ug/mL  S Daptomycin ($$$$$)  0.25 ug/mL  S Doxycycline ($$)  <=0.5 ug/mL  S Erythromycin ($$$$)  <=0.25 ug/mL  S Gentamicin ($)  <=0.5 ug/mL  S Levofloxacin ($)  <=0.12 ug/mL  S Linezolid ($$$$$)  2 ug/mL  S Moxifloxacin ($$$$)  <=0.25 ug/mL  S Oxacillin  <=0.25 ug/mL  S Rifampin ($$$$)  <=0.5 ug/mL  S1 Tetracycline  <=1 ug/mL  S Trimeth/Sulfa  <=10 ug/mL  S Vancomycin ($)  1 ug/mL  S Component  Value  Ref Range & Units  Status Special Requests:    Final   
RIGHT STERNOCLAVICULAR JOINT ABSCESS Culture result:  NO ANAEROBES ISOLATED     Final   
Result History CULTURE, ANAEROBIC on 8/22/2020 Imaging: 
CT chest 8/19/20 
  
FINDINGS: 
  
CHEST WALL: No mass or axillary lymphadenopathy. THYROID: No nodule. MEDIASTINUM: No mass or lymphadenopathy. ASHLYN: No mass or lymphadenopathy. THORACIC AORTA: No aneurysm. MAIN PULMONARY ARTERY: Normal in caliber. TRACHEA/BRONCHI: Patent. ESOPHAGUS: No wall thickening or dilatation. HEART: Coronary artery calcifications are present. PLEURA: No effusion or pneumothorax. LUNGS: No nodule, mass, or airspace disease. INCIDENTALLY IMAGED UPPER ABDOMEN: There is a right adrenal adenoma measuring 2.8 cm. BONES: There is inflammation involving the right sternoclavicular joint. There 
is no focal fluid collection. 
  
IMPRESSION IMPRESSION: 
Suspect septic arthritis of the right sternoclavicular joint. No drainable fluid 
collection. Assessment / Plan Ms. Lambert Godoy is a 21-year-old lady with a history of diabetes, hypertension, A. 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 
who was transferred from [place] for right sternoclavicular joint osteomyelitis//septic joint. She underwent incision and debridement of the right sternoclavicular joint, resection of the middle third of the right clavicle and placement of wound VAC on 8/20/2020. Operative findings include \" jacinta purulence within the [facility] joint which was eroded\" Her blood culture on 8/20/2020 was negative but wound cultures from [facility] joint debridement were positive for MSSA She is currently on IV vancomycin She reports a history of myalgias and muscle issues while on daptomycin previously She lists an allergy to antibiotics including Bactrim as well as penicillin. She had hives with penicillin. Cephalosporin was attempted and she says she passed out almost and had no idea what was happening to her when she received cefepime in the past. 
 
1) sternoclavicular joint abscess, osteomyelitis History of MSSA bacteremia, pacer vegetation status post removal and endocarditis treatment in April to May 2020 Left foot osteomyelitis status post amputation/BKA May 2020 I discussed with the patient that vancomycin is  not the best medication for MSSA.   However given her issues with antibiotics in the past to cephalosporins, penicillin and daptomycin,  we will continue with IV vancomycin as choices are limited Will plan on 6 weeks of antibiotics for sternoclavicular osteomyelitis till 10/5/20 check TTE Renally dose vancomycin by pharmacy for trough goal of 15-20 . Antibiotic side effects/adverse effects/toxicities discussed including gastrointestinal, renal, hematological , ability to lower siezure threshold, risk for C diff infection. Probiotics, daily yogurt encouraged. Check weekly labs for CBC with differential, BMP, ESR, CRP and vancomycin trough Vancomycin dosing to be adjusted by pharmacy for trough goal of 15-20. Vanc random level 18 today One other option is p.o. Zyvox but patient usually do not tolerate 6 weeks of Zyvox secondary to bone marrow suppression and also has drug drug interactions . May consider later in treatment course if unable to tolerate Vancomycin IV 
 
 
 
 
2) A. fib 3) PARAG Renally dose vancomycin 4) DVT prophylaxis 5) screening hep C antibody negative in March 2020 Check HIV-patient verbally consented Thank you for the opportunity to participate in the care of this patient. Please contact with questions or concerns.    
 
Jaye Gruber DO  
10:28 AM

## 2020-08-27 NOTE — PROGRESS NOTES
Thoracic Surgery Simple Progress Note Admit Date: 2020 POD: 3 Days Post-Op Procedure:  Procedure(s): RIGHT CHEST WOUND RE EXPLORATION, DELAYED CLOSURE (URGENT) Subjective:  
 
Patient has complaints: No significant medical complaints Wants to remove the sling as she states it's difficult to fed herself and use the walker Review of Systems: 
CARDIAC: positive for paroxysmal Afib, s/p pacer removal for lead infection/vegetation RESP: +R sternoclavicular joint infection NEURO:  negative INCISION: Clean, dry, and intact EXT: Denies new swelling or pain in the legs or calves. Objective:  
 
Blood pressure 147/48, pulse (!) 111, temperature 98.6 °F (37 °C), resp. rate 17, height 5' 10\" (1.778 m), weight 239 lb 8 oz (108.6 kg), SpO2 99 %. Temp (24hrs), Av.4 °F (36.9 °C), Min:98 °F (36.7 °C), Max:98.8 °F (37.1 °C) Hemodynamics PAP 
  CO 
  CI No intake/output data recorded.  1901 -  0700 In: 1297.1 [P.O.:720; I.V.:577.1] Out: 1120 [Urine:1100; Drains:20] EXAM: 
GENERAL: VSS, afrible, alert and cooperative HEART:  regularly irregular rhythm LUNG: clear to auscultation bilaterally, O2 sat 98% on RA 
NEURO:  normal without focal findings 
mental status, speech normal, alert and oriented x iii INCISION: Clean, dry, and intact EXTREMITIES:No evidence of DVT seen on physical exam. Lt BKA GI/: Abd soft, nonterder with + bowel sounds. Voiding via buckley Labs: 
Recent Results (from the past 24 hour(s)) GLUCOSE, POC Collection Time: 20 11:37 AM  
Result Value Ref Range Glucose (POC) 172 (H) 65 - 100 mg/dL Performed by Dmitri Isaacs. GLUCOSE, POC Collection Time: 20  4:29 PM  
Result Value Ref Range Glucose (POC) 136 (H) 65 - 100 mg/dL Performed by Dmitri Isaacs. GLUCOSE, POC Collection Time: 20  9:16 PM  
Result Value Ref Range Glucose (POC) 143 (H) 65 - 100 mg/dL Performed by Moe Alarcon METABOLIC PANEL, BASIC Collection Time: 08/27/20  5:50 AM  
Result Value Ref Range Sodium 137 136 - 145 mmol/L Potassium 4.8 3.5 - 5.1 mmol/L Chloride 109 (H) 97 - 108 mmol/L  
 CO2 21 21 - 32 mmol/L Anion gap 7 5 - 15 mmol/L Glucose 121 (H) 65 - 100 mg/dL BUN 23 (H) 6 - 20 MG/DL Creatinine 1.34 (H) 0.55 - 1.02 MG/DL  
 BUN/Creatinine ratio 17 12 - 20 GFR est AA 49 (L) >60 ml/min/1.73m2 GFR est non-AA 40 (L) >60 ml/min/1.73m2 Calcium 8.5 8.5 - 10.1 MG/DL  
CBC WITH AUTOMATED DIFF Collection Time: 08/27/20  5:50 AM  
Result Value Ref Range WBC 9.4 3.6 - 11.0 K/uL  
 RBC 3.14 (L) 3.80 - 5.20 M/uL HGB 9.2 (L) 11.5 - 16.0 g/dL HCT 30.6 (L) 35.0 - 47.0 % MCV 97.5 80.0 - 99.0 FL  
 MCH 29.3 26.0 - 34.0 PG  
 MCHC 30.1 30.0 - 36.5 g/dL  
 RDW 15.7 (H) 11.5 - 14.5 % PLATELET 444 (H) 232 - 400 K/uL MPV 10.8 8.9 - 12.9 FL  
 NRBC 0.0 0  WBC ABSOLUTE NRBC 0.00 0.00 - 0.01 K/uL NEUTROPHILS 77 (H) 32 - 75 % LYMPHOCYTES 10 (L) 12 - 49 % MONOCYTES 9 5 - 13 % EOSINOPHILS 2 0 - 7 % BASOPHILS 1 0 - 1 % IMMATURE GRANULOCYTES 1 (H) 0.0 - 0.5 % ABS. NEUTROPHILS 7.3 1.8 - 8.0 K/UL  
 ABS. LYMPHOCYTES 1.0 0.8 - 3.5 K/UL  
 ABS. MONOCYTES 0.8 0.0 - 1.0 K/UL  
 ABS. EOSINOPHILS 0.2 0.0 - 0.4 K/UL  
 ABS. BASOPHILS 0.1 0.0 - 0.1 K/UL  
 ABS. IMM. GRANS. 0.1 (H) 0.00 - 0.04 K/UL  
 DF AUTOMATED    
GLUCOSE, POC Collection Time: 08/27/20  8:13 AM  
Result Value Ref Range Glucose (POC) 114 (H) 65 - 100 mg/dL Performed by Michelle Law Assessment:  
No evidence of DVT. Principal Problem: 
  Sepsis ([address] (8/20/2020) Active Problems: 
  Disorder of sternoclavicular joint (8/21/2020) MICRO: Heavy Staphylococcus Auerus Plan/Recommendations:  
Continue IV antibiotics Ok to remove sling to eat and do non weight baring activities, other wise keep it on  
Daily wound care & dressing changes Daily PT 
OK to restart Coumadin today See orders Signed By: Feli Willson FNP Pt feeling better today Dressing CDI Incision CDI, skin edges viable, minimal swelling Ok to take sling out for feeding and ambulation Keep drains as are Camron Grover MD

## 2020-08-27 NOTE — PROGRESS NOTES
Pharmacist Note  Warfarin Dosing Consult provided for this 60 y. o.female to manage warfarin for Atrial Fibrillation INR Goal: 2 - 3 Home regimen/ tablet size: 2 mg PO daily Drugs that may increase INR: None Drugs that may decrease INR: None Other current anticoagulants/ drugs that may increase bleeding risk: Sertraline Risk factors: None Daily INR ordered: YES Recent Labs  
  08/27/20 
0550 08/26/20 
0800 08/25/20 
0331 HGB 9.2* 9.0* 9.2* Date               INR                  Dose 8/27  --  3 mg Assessment/ Plan: Will order warfarin 3 mg PO x 1 dose. Pharmacy will continue to monitor daily and adjust therapy as indicated. Please contact the pharmacist at x [phone] or  for outpatient recommendations if needed.   
 
Kristen Antonio, PharmD, BCPS

## 2020-08-27 NOTE — PROGRESS NOTES
OCCUPATIONAL THERAPY: OT reviewed chart, received clearance from nurse for therapy. Patient declining to participate with OT despite encouragement stating \"I just don't feel like it' and \"I hurt\" as she used PCA for pain med administration. Slightly lowered HOB per patient preference for pain management. Will f/u per 5 x week POC. Will benefit from rehab when appropriate--is requiring A x 2 to mobilize and for ADL currently.  
Valdemar Brown, OTR/L

## 2020-08-27 NOTE — PROGRESS NOTES
Pharmacist Note - Vancomycin Dosing Therapy day 8 Indication:  suspected infected sternoclavicular joint Current regimen:  Dosing by levels (last dose 8/25 @ 1531) A Random Level resulted at 14.2 mcg/mL Goal trough ~15 mcg/ml Plan: Vancomycin 1250 mg now. Pharmacy will continue to monitor this patient daily for changes in clinical status and renal function.

## 2020-08-28 NOTE — PROGRESS NOTES
1954-Bedside shift change report given to [address] [place] [address],  [id number]  (oncoming nurse) by Tiana Thornton RN  (offgoing nurse). Report included the following information SBAR, Intake/Output, MAR, Accordion and Recent Results.

## 2020-08-28 NOTE — PROGRESS NOTES
[facility] [facility] Adult  Hospitalist Group Hospitalist Progress Note John Plascencia MD 
Answering service: [phone] from in house phone NAME:  Lloyd Pimentel :  1960 MRN:  [id number] Admission Summary: A 60year old female with past medical history HTN, DM Type II on insulin, Atrial fibrillation/flutter, CKD stage III, recent admission 2020-2020 for left foot cellulitis/OM s/p left BKA, bacteremia MSSA, endocarditis, Pacemaker vegetation s/p lead extraction, presenting to [facility] as a direct transfer from [address] [place] [address] emergency department for thoracic surgery evaluation.  Patient developed right-sided chest pain/tenderness radiating to right shoulder.  Seen at urgent care and found to have atrial fibrillation with RVR. Sent to the emergency department for further evaluation.  In the ED, CT chest demonstrated septic arthritis of the right sternal clavicular joint and no abscess.  Given Merrem and vancomycin and transferred to [facility] 2020 
  
Interval history / Subjective:  
 
Patient seen and examined at the bedside Some issues with pain control- increased pain med doses today Followed by ID and thoracic surgery 
-COVID 19 test negative Assessment & Plan:  
 
Sepsis secondary to septic right sternoclavicular joint and abscess- resolved  
-Recent complicated hospitalization with MSSA bacteremia/endocarditis - Incision and debridement of right sternoclavicular joint.  Resection of medial third of right clavicle.  Placement of wound VAC on  
- OR cx: MSSA 
- on iv vancomycin - pain control with dilaudid - Thoracic surgery and ID consulted and following 
  
Atrial fibrillation with RVR 
-rate controlled, off Cardizem drip, continue with oral diltiazem 
- HR still in 100's  
restarted on warfarin- pharmacy to dose Hx of pacemaker lead vegetation and s/p removal of device 
-stable, continue cardiac monitoring 
  
PARAG on CKD stage III  
-creatinine improving 
- secondary to sepsis Back to baseline- stopped fluids and restarting bumex in am tomorrow 
  
T2DM 
-Hold long-acting, continue SSI. A1c 6.1, monitor finger stick glucose 
  
HTN  
- BP stable on current med regimen 
  
Depression 
-stable, continue buspirone, Zoloft Vaginal yeast infection- starting diflucan Code status:Full Code DVT prophylaxis:SCD Care Plan discussed with: Patient/Family, Nurse and  Anticipated Disposition: TBD Anticipated Discharge: Greater than 48 hours Hospital Problems  Date Reviewed: 8/24/2020 Codes Class Noted POA Disorder of sternoclavicular joint ICD-10-CM: M25.9 ICD-9-CM: 719.91  8/21/2020 Unknown * (Principal) Sepsis ([facility] [address] ICD-10-CM: A41.9 ICD-9-CM: 038.9, 995.91  8/20/2020 Yes Vital Signs:  
 Last 24hrs VS reviewed since prior progress note. Most recent are: 
Visit Vitals /60 (BP 1 Location: Left arm, BP Patient Position: At rest) Pulse 92 Temp 98.7 °F (37.1 °C) Resp 13 Ht 5' 10\" (1.778 m) Wt 108.9 kg (240 lb) SpO2 99% BMI 34.44 kg/m² Intake/Output Summary (Last 24 hours) at 8/28/2020 1845 Last data filed at 8/28/2020 1645 Gross per 24 hour Intake 966.67 ml Output 1050 ml Net -83.33 ml Physical Examination:  
 
 
     
Constitutional:  No acute distress, cooperative, pleasant ENT:  Oral mucosa moist, oropharynx benign. Resp: 
Chest:  CTA bilaterally. No wheezing/rhonchi/rales. No accessory muscle use Right clavicular area dressed, drainage in place CV:  Regular rhythm, normal rate, no murmurs, gallops, rubs GI:  Soft, non distended, non tender. normoactive bowel sounds, no hepatosplenomegaly Musculoskeletal:  No edema, left BKA Neurologic:  Moves all extremities. AAOx3, CN II-XII reviewed Skin:  Good turgor, no rashes or ulcers Data Review:  
 Review and/or order of clinical lab test 
Review and/or order of tests in the radiology section of CPT Review and/or order of tests in the medicine section of CPT Labs:  
 
Recent Labs  
  08/27/20 
0550 08/26/20 
0800 WBC 9.4 10.4 HGB 9.2* 9.0*  
HCT 30.6* 30.2* * 581* Recent Labs  
  08/28/20 
0304 08/27/20 
0550 08/26/20 
0800  137 137  
K 4.2 4.8 4.4  
* 109* 110* CO2 19* 21 22 BUN 19 23* 23* CREA 1.26* 1.34* 1.46* GLU 92 121* 116* CA 8.0* 8.5 8.6 No results for input(s): ALT, AP, TBIL, TBILI, TP, ALB, GLOB, GGT, AML, LPSE in the last 72 hours. No lab exists for component: SGOT, GPT, AMYP, HLPSE Recent Labs  
  08/28/20 
0304 INR 1.1 PTP 11.0 No results for input(s): FE, TIBC, PSAT, FERR in the last 72 hours. Lab Results Component Value Date/Time Folate 8.5 03/14/2020 02:49 PM  
  
No results for input(s): PH, PCO2, PO2 in the last 72 hours. No results for input(s): CPK, CKNDX, TROIQ in the last 72 hours. No lab exists for component: CPKMB Lab Results Component Value Date/Time Cholesterol, total 141 11/11/2019 08:47 AM  
 HDL Cholesterol 32 (L) 11/11/2019 08:47 AM  
 LDL, calculated 82 11/11/2019 08:47 AM  
 Triglyceride 137 11/11/2019 08:47 AM  
 
Lab Results Component Value Date/Time Glucose (POC) 133 (H) 08/28/2020 05:44 PM  
 Glucose (POC) 124 (H) 08/28/2020 05:09 PM  
 Glucose (POC) 122 (H) 08/28/2020 11:50 AM  
 Glucose (POC) 115 (H) 08/28/2020 08:41 AM  
 Glucose (POC) 219 (H) 08/27/2020 09:19 PM  
 
Lab Results Component Value Date/Time  Color YELLOW/STRAW 05/23/2020 02:35 PM  
 Appearance CLOUDY (A) 05/23/2020 02:35 PM  
 Specific gravity 1.009 05/23/2020 02:35 PM  
 pH (UA) 6.0 05/23/2020 02:35 PM  
 Protein Negative 05/23/2020 02:35 PM  
 Glucose Negative 05/23/2020 02:35 PM  
 Ketone Negative 05/23/2020 02:35 PM  
 Bilirubin Negative 05/23/2020 02:35 PM  
 Urobilinogen 0.2 05/23/2020 02:35 PM  
 Nitrites Positive (A) 05/23/2020 02:35 PM  
 Leukocyte Esterase LARGE (A) 05/23/2020 02:35 PM  
 Epithelial cells FEW 05/23/2020 02:35 PM  
 Bacteria 4+ (A) 05/23/2020 02:35 PM  
 WBC >100 (H) 05/23/2020 02:35 PM  
 RBC 0-5 05/23/2020 02:35 PM  
 
 
 
Medications Reviewed:  
 
Current Facility-Administered Medications Medication Dose Route Frequency  [START ON 8/29/2020] vancomycin (VANCOCIN) 1250 mg in  ml infusion  1,250 mg IntraVENous Q48H  
 [START ON 8/29/2020] fluconazole (DIFLUCAN) tablet 200 mg  200 mg Oral DAILY  HYDROmorphone (DILAUDID) tablet 2 mg  2 mg Oral Q4H PRN  
 [START ON 8/29/2020] bumetanide (BUMEX) tablet 2 mg  2 mg Oral DAILY  Warfarin- Pharmacy Dosing   Other Rx Dosing/Monitoring  HYDROmorphone (PF) (DILAUDID) injection 0.5 mg  0.5 mg IntraVENous Q3H PRN  
 nystatin (MYCOSTATIN) 100,000 unit/gram cream   Topical BID  sodium chloride (NS) flush 5-40 mL  5-40 mL IntraVENous Q8H  
 sodium chloride (NS) flush 5-40 mL  5-40 mL IntraVENous PRN  
 naloxone (NARCAN) injection 0.1 mg  0.1 mg IntraVENous PRN  
 diphenhydrAMINE (BENADRYL) injection 12.5 mg  12.5 mg IntraVENous Q6H PRN  
 bisacodyL (DULCOLAX) tablet 10 mg  10 mg Oral DAILY  hydrALAZINE (APRESOLINE) 20 mg/mL injection 20 mg  20 mg IntraVENous Q6H PRN  
 hydrALAZINE (APRESOLINE) tablet 100 mg  100 mg Oral TID  busPIRone (BUSPAR) tablet 10 mg  10 mg Oral BID  dilTIAZem ER (CARDIZEM CD) capsule 180 mg  180 mg Oral DAILY  sertraline (ZOLOFT) tablet 75 mg  75 mg Oral DAILY  sodium chloride (NS) flush 5-40 mL  5-40 mL IntraVENous Q8H  
 sodium chloride (NS) flush 5-40 mL  5-40 mL IntraVENous PRN  
 acetaminophen (TYLENOL) tablet 650 mg  650 mg Oral Q6H PRN Or  
 acetaminophen (TYLENOL) suppository 650 mg  650 mg Rectal Q6H PRN  polyethylene glycol (MIRALAX) packet 17 g  17 g Oral DAILY PRN  
  promethazine (PHENERGAN) tablet 12.5 mg  12.5 mg Oral Q6H PRN Or  
 ondansetron (ZOFRAN) injection 4 mg  4 mg IntraVENous Q6H PRN  Vancomycin - pharmacy to dose   Other Rx Dosing/Monitoring  glucose chewable tablet 16 g  4 Tab Oral PRN  
 glucagon (GLUCAGEN) injection 1 mg  1 mg IntraMUSCular PRN  
 dextrose 10% infusion 0-250 mL  0-250 mL IntraVENous PRN  
 gabapentin (NEURONTIN) capsule 100 mg  100 mg Oral TID PRN  
 insulin regular (NOVOLIN R, HUMULIN R) injection   SubCUTAneous AC&HS  
 
______________________________________________________________________ EXPECTED LENGTH OF STAY: 3d 17h ACTUAL LENGTH OF STAY:          8 Evelyn Mendez MD

## 2020-08-28 NOTE — PROGRESS NOTES
Pharmacist Note  Warfarin Dosing Consult provided for this 60 y. o.female to manage warfarin for Atrial Fibrillation INR Goal: 2 - 3 Home regimen/ tablet size: 2 mg PO daily Drugs that may increase INR: None Drugs that may decrease INR: None Other current anticoagulants/ drugs that may increase bleeding risk: Sertraline Risk factors: None Daily INR ordered: YES Recent Labs  
  08/28/20 
0304 08/27/20 
0550 08/26/20 
0800 HGB  --  9.2* 9.0* INR 1.1  --   --   
 
Date               INR                  Dose 8/27  --  3 mg  
8/28                1.1                   3 mg Assessment/ Plan: Will order warfarin 3 mg PO x 1 dose. Pharmacy will continue to monitor daily and adjust therapy as indicated. Please contact the pharmacist at x [phone] or  for outpatient recommendations if needed.   
 
Kristen Antonio, PharmD, BCPS

## 2020-08-28 NOTE — PROGRESS NOTES
[address] [facility] Adult  Hospitalist Group Hospitalist Progress Note Major Charlton MD 
Answering service: [phone] OR [phone] from in house phone NAME:  Aldair Mcdonnell :  1960 MRN:  [id number] Admission Summary: A 60year old female with past medical history HTN, DM Type II on insulin, Atrial fibrillation/flutter, CKD stage III, recent admission 2020-2020 for left foot cellulitis/OM s/p left BKA, bacteremia MSSA, endocarditis, Pacemaker vegetation s/p lead extraction, presenting to [facility] as a direct transfer from [address] [place] [address] emergency department for thoracic surgery evaluation.  Patient developed right-sided chest pain/tenderness radiating to right shoulder.  Seen at urgent care and found to have atrial fibrillation with RVR. Sent to the emergency department for further evaluation.  In the ED, CT chest demonstrated septic arthritis of the right sternal clavicular joint and no abscess.  Given Merrem and vancomycin and transferred to [facility] 2020 
  
Interval history / Subjective:  
 
Patient seen and examined at the bedside Some issues with pain control Followed by ID and thoracic surgery Assessment & Plan:  
 
Sepsis (presented with tachycardia leukocytosis) secondary to septic right sternoclavicular joint and abscess  
-s/p I&D on   
-Recent complicated hospitalization with MSSA bacteremia/endocarditis - Incision and debridement of right sternoclavicular joint.  Resection of medial third of right clavicle.  Placement of wound VAC on  
- leucocytosis - resolved - blood cultures: NGTD 
- OR cx: MSSA 
- on iv vancomycin - pain meds - on fentanyl PCA pump , need to transition to po meds and IV meds prn on Monday after OR 
-COVID 19 test negative  
-SpO2 100 % on RA 
- Thoracic surgeon and ID are on board 
  
Atrial fibrillation with RVR 
 -rate controlled, off Cardizem drip, continue with oral diltiazem 
- HR still in 100's  
-Off Coumadin for surgery, start full dose Lovenox for now and will discuss with thoracic if okay to resume warfarin Hx of pacemaker lead vegetation and s/p removal of device 
-stable, continue cardiac monitoring 
  
PARAG on CKD stage III  
-creatinine improving 
- secondary to sepsis Back to baseline-  
  
T2DM 
-Hold long-acting, continue SSI. A1c 6.1, monitor finger stick glucose 
  
HTN  
- BP elevated likely due to pain. continue hydralazine, Cardizem and prn IV hydralzine, monitor BP 
  
Depression 
-stable, continue buspirone, Zoloft Code status:Full Code DVT prophylaxis:SCD Care Plan discussed with: Patient/Family, Nurse and  Anticipated Disposition: TBD Anticipated Discharge: Greater than 48 hours Hospital Problems  Date Reviewed: 8/24/2020 Codes Class Noted POA Disorder of sternoclavicular joint ICD-10-CM: M25.9 ICD-9-CM: 719.91  8/21/2020 Unknown * (Principal) Sepsis ([facility] [address] ICD-10-CM: A41.9 ICD-9-CM: 038.9, 995.91  8/20/2020 Yes Vital Signs:  
 Last 24hrs VS reviewed since prior progress note. Most recent are: 
Visit Vitals /60 (BP 1 Location: Left arm, BP Patient Position: At rest) Pulse 92 Temp 98.7 °F (37.1 °C) Resp 13 Ht 5' 10\" (1.778 m) Wt 108.9 kg (240 lb) SpO2 99% BMI 34.44 kg/m² Intake/Output Summary (Last 24 hours) at 8/28/2020 1840 Last data filed at 8/28/2020 1645 Gross per 24 hour Intake 966.67 ml Output 1050 ml Net -83.33 ml Physical Examination:  
 
 
     
Constitutional:  No acute distress, cooperative, pleasant ENT:  Oral mucosa moist, oropharynx benign. Resp: 
Chest:  CTA bilaterally. No wheezing/rhonchi/rales. No accessory muscle use Right clavicular area dressed, drainage in place CV:  Regular rhythm, normal rate, no murmurs, gallops, rubs GI:  Soft, non distended, non tender. normoactive bowel sounds, no hepatosplenomegaly Musculoskeletal:  No edema, left BKA Neurologic:  Moves all extremities. AAOx3, CN II-XII reviewed Skin:  Good turgor, no rashes or ulcers Data Review:  
 Review and/or order of clinical lab test 
Review and/or order of tests in the radiology section of CPT Review and/or order of tests in the medicine section of CPT Labs:  
 
Recent Labs  
  08/27/20 
0550 08/26/20 
0800 WBC 9.4 10.4 HGB 9.2* 9.0*  
HCT 30.6* 30.2* * 581* Recent Labs  
  08/28/20 
0304 08/27/20 
0550 08/26/20 
0800  137 137  
K 4.2 4.8 4.4  
* 109* 110* CO2 19* 21 22 BUN 19 23* 23* CREA 1.26* 1.34* 1.46* GLU 92 121* 116* CA 8.0* 8.5 8.6 No results for input(s): ALT, AP, TBIL, TBILI, TP, ALB, GLOB, GGT, AML, LPSE in the last 72 hours. No lab exists for component: SGOT, GPT, AMYP, HLPSE Recent Labs  
  08/28/20 
0304 INR 1.1 PTP 11.0 No results for input(s): FE, TIBC, PSAT, FERR in the last 72 hours. Lab Results Component Value Date/Time Folate 8.5 03/14/2020 02:49 PM  
  
No results for input(s): PH, PCO2, PO2 in the last 72 hours. No results for input(s): CPK, CKNDX, TROIQ in the last 72 hours. No lab exists for component: CPKMB Lab Results Component Value Date/Time Cholesterol, total 141 11/11/2019 08:47 AM  
 HDL Cholesterol 32 (L) 11/11/2019 08:47 AM  
 LDL, calculated 82 11/11/2019 08:47 AM  
 Triglyceride 137 11/11/2019 08:47 AM  
 
Lab Results Component Value Date/Time Glucose (POC) 133 (H) 08/28/2020 05:44 PM  
 Glucose (POC) 124 (H) 08/28/2020 05:09 PM  
 Glucose (POC) 122 (H) 08/28/2020 11:50 AM  
 Glucose (POC) 115 (H) 08/28/2020 08:41 AM  
 Glucose (POC) 219 (H) 08/27/2020 09:19 PM  
 
Lab Results Component Value Date/Time  Color YELLOW/STRAW 05/23/2020 02:35 PM  
 Appearance CLOUDY (A) 05/23/2020 02:35 PM  
 Specific gravity 1.009 05/23/2020 02:35 PM  
 pH (UA) 6.0 05/23/2020 02:35 PM  
 Protein Negative 05/23/2020 02:35 PM  
 Glucose Negative 05/23/2020 02:35 PM  
 Ketone Negative 05/23/2020 02:35 PM  
 Bilirubin Negative 05/23/2020 02:35 PM  
 Urobilinogen 0.2 05/23/2020 02:35 PM  
 Nitrites Positive (A) 05/23/2020 02:35 PM  
 Leukocyte Esterase LARGE (A) 05/23/2020 02:35 PM  
 Epithelial cells FEW 05/23/2020 02:35 PM  
 Bacteria 4+ (A) 05/23/2020 02:35 PM  
 WBC >100 (H) 05/23/2020 02:35 PM  
 RBC 0-5 05/23/2020 02:35 PM  
 
 
 
Medications Reviewed:  
 
Current Facility-Administered Medications Medication Dose Route Frequency  [START ON 8/29/2020] vancomycin (VANCOCIN) 1250 mg in  ml infusion  1,250 mg IntraVENous Q48H  
 [START ON 8/29/2020] fluconazole (DIFLUCAN) tablet 200 mg  200 mg Oral DAILY  HYDROmorphone (DILAUDID) tablet 2 mg  2 mg Oral Q4H PRN  
 [START ON 8/29/2020] bumetanide (BUMEX) tablet 2 mg  2 mg Oral DAILY  Warfarin- Pharmacy Dosing   Other Rx Dosing/Monitoring  HYDROmorphone (PF) (DILAUDID) injection 0.5 mg  0.5 mg IntraVENous Q3H PRN  
 nystatin (MYCOSTATIN) 100,000 unit/gram cream   Topical BID  sodium chloride (NS) flush 5-40 mL  5-40 mL IntraVENous Q8H  
 sodium chloride (NS) flush 5-40 mL  5-40 mL IntraVENous PRN  
 naloxone (NARCAN) injection 0.1 mg  0.1 mg IntraVENous PRN  
 diphenhydrAMINE (BENADRYL) injection 12.5 mg  12.5 mg IntraVENous Q6H PRN  
 bisacodyL (DULCOLAX) tablet 10 mg  10 mg Oral DAILY  hydrALAZINE (APRESOLINE) 20 mg/mL injection 20 mg  20 mg IntraVENous Q6H PRN  
 hydrALAZINE (APRESOLINE) tablet 100 mg  100 mg Oral TID  busPIRone (BUSPAR) tablet 10 mg  10 mg Oral BID  dilTIAZem ER (CARDIZEM CD) capsule 180 mg  180 mg Oral DAILY  sertraline (ZOLOFT) tablet 75 mg  75 mg Oral DAILY  sodium chloride (NS) flush 5-40 mL  5-40 mL IntraVENous Q8H  
 sodium chloride (NS) flush 5-40 mL  5-40 mL IntraVENous PRN  
  acetaminophen (TYLENOL) tablet 650 mg  650 mg Oral Q6H PRN Or  
 acetaminophen (TYLENOL) suppository 650 mg  650 mg Rectal Q6H PRN  polyethylene glycol (MIRALAX) packet 17 g  17 g Oral DAILY PRN  promethazine (PHENERGAN) tablet 12.5 mg  12.5 mg Oral Q6H PRN Or  
 ondansetron (ZOFRAN) injection 4 mg  4 mg IntraVENous Q6H PRN  Vancomycin - pharmacy to dose   Other Rx Dosing/Monitoring  glucose chewable tablet 16 g  4 Tab Oral PRN  
 glucagon (GLUCAGEN) injection 1 mg  1 mg IntraMUSCular PRN  
 dextrose 10% infusion 0-250 mL  0-250 mL IntraVENous PRN  
 gabapentin (NEURONTIN) capsule 100 mg  100 mg Oral TID PRN  
 insulin regular (NOVOLIN R, HUMULIN R) injection   SubCUTAneous AC&HS  
 
______________________________________________________________________ EXPECTED LENGTH OF STAY: 3d 17h ACTUAL LENGTH OF STAY:          8 Trupti Herrera MD

## 2020-08-28 NOTE — PROGRESS NOTES
Patient had an uneventful night. Pt received all medications and care according to MD orders. Safety parameters maintained. Effective bedside shift report and handoff given to oncoming Nurse, [place] and her preceptee for routine progression of care.

## 2020-08-28 NOTE — PROGRESS NOTES
Thoracic Surgery Simple Progress Note Admit Date: 2020 POD: 4 Days Post-Op Procedure:  Procedure(s): RIGHT CHEST WOUND RE EXPLORATION, DELAYED CLOSURE (URGENT) Subjective:  
 
Patient has complaints: No significant medical complaints Pain is better control on Dilaudid Review of Systems: 
CARDIAC: positive for paroxysmal Afib, s/p pacer removal for lead infection/vegetation RESP: +R sternoclavicular joint infection NEURO:  negative INCISION: Clean, dry, and intact EXT: Denies new swelling or pain in the legs or calves Objective:  
 
Blood pressure 150/56, pulse 97, temperature 98.9 °F (37.2 °C), resp. rate 13, height 5' 10\" (1.778 m), weight 240 lb 14.4 oz (109.3 kg), SpO2 99 %. Temp (24hrs), Av.3 °F (36.8 °C), Min:97.7 °F (36.5 °C), Max:98.9 °F (37.2 °C) Hemodynamics PAP 
  CO 
  CI 
 
 0701 -  1900 In: -  
Out: 700 [Urine:700]  190 -  0700 In: 960 [P.O.:960] Out: 670 [Urine:600; Drains:70] EXAM: 
GENERAL: VSS, afrible, alert and cooperative HEART:  regularly irregular rhythm LUNG: clear to auscultation bilaterally, O2 sat 98% on RA 
NEURO:  normal without focal findings 
mental status, speech normal, alert and oriented x iii INCISION: Clean, dry, and intact, JPs x two in place, minimal drainage EXTREMITIES:No evidence of DVT seen on physical exam.Lt BKA GI/: Abd soft, nonterder with + bowel sounds. Voiding Labs: 
Recent Results (from the past 24 hour(s)) GLUCOSE, POC Collection Time: 20 11:49 AM  
Result Value Ref Range Glucose (POC) 147 (H) 65 - 100 mg/dL Performed by Derek King GLUCOSE, POC Collection Time: 20  4:31 PM  
Result Value Ref Range Glucose (POC) 156 (H) 65 - 100 mg/dL Performed by [facility]  PCT   
VANCOMYCIN, RANDOM Collection Time: 20  6:43 PM  
Result Value Ref Range  Vancomycin, random 14.2 UG/ML  
GLUCOSE, POC  
 Collection Time: 08/27/20  9:19 PM  
Result Value Ref Range Glucose (POC) 219 (H) 65 - 100 mg/dL Performed by Wilbur Rosario RN   
METABOLIC PANEL, BASIC Collection Time: 08/28/20  3:04 AM  
Result Value Ref Range Sodium 139 136 - 145 mmol/L Potassium 4.2 3.5 - 5.1 mmol/L Chloride 112 (H) 97 - 108 mmol/L  
 CO2 19 (L) 21 - 32 mmol/L Anion gap 8 5 - 15 mmol/L Glucose 92 65 - 100 mg/dL BUN 19 6 - 20 MG/DL Creatinine 1.26 (H) 0.55 - 1.02 MG/DL  
 BUN/Creatinine ratio 15 12 - 20 GFR est AA 53 (L) >60 ml/min/1.73m2 GFR est non-AA 43 (L) >60 ml/min/1.73m2 Calcium 8.0 (L) 8.5 - 10.1 MG/DL PROTHROMBIN TIME + INR Collection Time: 08/28/20  3:04 AM  
Result Value Ref Range INR 1.1 0.9 - 1.1 Prothrombin time 11.0 9.0 - 11.1 sec Assessment:  
No evidence of DVT. Principal Problem: 
  Sepsis ([address] (8/20/2020) Active Problems: 
  Disorder of sternoclavicular joint (8/21/2020) 
 
 
  
 
MICRO: Heavy Staphylococcus Auerus Plan/Recommendations: ABX per ID Continue PT/OT Discharge when medically ready, may need rehab She will need to follow up with us in clinic two weeks post discharge See orders Signed By: Soy PARDO

## 2020-08-28 NOTE — PROGRESS NOTES
Problem: Mobility Impaired (Adult and Pediatric) Goal: *Acute Goals and Plan of Care (Insert Text) Description: FUNCTIONAL STATUS PRIOR TO ADMISSION: Patient was modified independent using a rolling walker and and L BKA prosthesis for functional mobility. HOME SUPPORT PRIOR TO ADMISSION: The patient lived with family but did not require assist. 
 
Physical Therapy Goals Initiated 8/23/2020 1. Patient will move from supine to sit and sit to supine , scoot up and down, and roll side to side in bed with minimal assistance/contact guard assist within 7 day(s). 2.  Patient will transfer from bed to chair and chair to bed with minimal assistance/contact guard assist using the least restrictive device within 7 day(s). 3.  Patient will perform sit to stand with minimal assistance/contact guard assist within 7 day(s). 4.  Patient will ambulate with minimal assistance/contact guard assist for 10 feet with the least restrictive device within 7 day(s). Outcome: Progressing Towards Goal 
 
PHYSICAL THERAPY TREATMENT Patient: Priya Sarabia (18 y.o. female) Date: 8/28/2020 Diagnosis: Sepsis ([facility] [address] [A41.9] Sepsis ([facility] [address] Procedure(s) (LRB): 
RIGHT CHEST WOUND RE EXPLORATION, DELAYED CLOSURE (URGENT) (Right) 4 Days Post-Op Precautions: Fall, Contact right UE NWB sling Chart, physical therapy assessment, plan of care and goals were reviewed. ASSESSMENT Patient continues with skilled PT services and is progressing towards goals. Pt reporting not feeling well today. Pt was able to get EOB easier today but increase difficulties with sit to stand. Pt was able to side step to EOB but fatigued with task. Pt defer transfer to chair due to not feeling well. Pt has good awareness with donning and doffing prothesis. Pt has increase pain in right clavicle/ shoulder with mobility. Maybe beneftial to attempt the use of hemiwalker.  Pt is currently using rolling walker with only left UE. Pt is NWB on right UE with sling during PT Current Level of Function Impacting Discharge (mobility/balance): Ax2 Other factors to consider for discharge: Pain, right UE NWB, new prosthetic  [id number] PLAN : 
Patient continues to benefit from skilled intervention to address the above impairments. Continue treatment per established plan of care. to address goals. Recommendation for discharge: (in order for the patient to meet his/her long term goals) Therapy 3 hours per day 5-7 days per week This discharge recommendation: 
Has not yet been discussed the attending provider and/or case management IF patient discharges home will need the following DME: to be determined (TBD) SUBJECTIVE:  
Patient stated I just can't today, sorry.  OBJECTIVE DATA SUMMARY:  
Critical Behavior: 
Neurologic State: Alert, Appropriate for age, Eyes open spontaneously, Sleeping Orientation Level: Oriented X4 Cognition: Appropriate decision making, Appropriate for age attention/concentration, Appropriate safety awareness Functional Mobility Training: 
Bed Mobility: 
Rolling: Maximum assistance Supine to Sit: Maximum assistance Sit to Supine: Minimum assistance;Assist x2 Transfers: 
Sit to Stand: Moderate assistance;Assist x2 Balance: 
Sitting: Impaired Sitting - Static: Good (unsupported) Sitting - Dynamic: Fair (occasional) Standing: Impaired Standing - Static: Fair Standing - Dynamic : Fair Ambulation/Gait Training: 
  
  
  
  
  
  
  
  
  
  
  
  
  
  
  
  
  
  
Stairs: Therapeutic Exercises:  
 
Pain Rating: 
Right shoulder Activity Tolerance:  
Limited Please refer to the flowsheet for vital signs taken during this treatment. After treatment patient left in no apparent distress:  
Supine in bed and Call bell within reach COMMUNICATION/COLLABORATION:  
 The patients plan of care was discussed with: Registered nurse. Marilyn Villanueva PTA Time Calculation: 31 mins

## 2020-08-28 NOTE — PROGRESS NOTES
1300: Thoracic surgery stated that it was okay for pt to remove sling while eating and resting. Must wear the sling while working with PT or for any weight bearing activities. 1400: Lovenox discontinued. Pt started back on Warfarin for Afib.  
 
1800: Thoracic surgeon removed pts surgical dressing. Stated that this was the third day post op and can be left open to air. May cover with 4x4 if at pts request or if necessary. 1830: Received orders to discontinue PCA Fentanyl pump. Pt now receiving IV Dilaudid. 1930: Bedside and Verbal shift change report given to [address] [place] [address] (oncoming nurse) by Nestor Sarkar and Braulio Dickey RN (offgoing nurse). Report included the following information SBAR, Kardex, MAR, Recent Results and Cardiac Rhythm A fib. Problem: Pain Goal: *Control of Pain Outcome: Progressing Towards Goal 
Pts PCA fentanyl pump discontinued and started on IV Dilaudid. Pt is comfortable at the moment, no complaints of pain. Problem: Patient Education: Go to Patient Education Activity Goal: Patient/Family Education Outcome: Progressing Towards Goal 
  
Problem: Sepsis: Discharge Outcomes Goal: *Lungs clear or at baseline Outcome: Progressing Towards Goal 
Goal: *Oxygen saturation returns to baseline or 90% or better on room air Outcome: Progressing Towards Goal 
Goal: *Glycemic control Outcome: Progressing Towards Goal 
Goal: *Optimal pain control at patient's stated goal 
Outcome: Progressing Towards Goal

## 2020-08-28 NOTE — PROGRESS NOTES
Infectious Disease Progress Note Subjective: Ms Sherrell Avila seen 
 + pain in sternal area Tolerating antibiotics so far without diarrhea /gi upset ROS: 10 point ROS obtained and pertinent positives includeabove. All others negative. Objective: 
 
Vitals:  
Patient Vitals for the past 24 hrs: 
 Temp Pulse Resp BP SpO2  
08/28/20 0903  (!) 103  166/68   
08/28/20 0711 98.9 °F (37.2 °C) 97 13 150/56 99 % 08/28/20 0314 98.7 °F (37.1 °C) 88 16 185/85 98 % 08/27/20 2337  93 14 133/45 98 % 08/27/20 1930 98.2 °F (36.8 °C) 100 11 157/40 99 % 08/27/20 1547 97.7 °F (36.5 °C) 100 17 115/68 100 % 08/27/20 1240 97.9 °F (36.6 °C) 99 14 161/46 98 % Physical Exam: 
Gen: No apparent distress HEENT:   no scleral icterus, no thrush, CV: S1,2 heard regularly, no lower extremity edema  , + dressing chest , drains noted Lungs: Clear to auscultation bilaterally, Abdomen: soft, non tender,  
Skin: no rash , RLE chronic venous changes to skin Musculoskeletal:  No joint edema, erythema or tenderness noted RLE, + LLE BKA Medications: 
 
Current Facility-Administered Medications:  
  Warfarin- Pharmacy Dosing, , Other, Rx Dosing/Monitoring, Miesha Dwyer MD 
  HYDROmorphone (DILAUDID) tablet 1 mg, 1 mg, Oral, Q4H PRN, Miesha Dwyer MD, 1 mg at 08/28/20 0903 
  HYDROmorphone (PF) (DILAUDID) injection 0.5 mg, 0.5 mg, IntraVENous, Q3H PRN, Miesha Dwyer MD, 0.5 mg at 08/28/20 0319   nystatin (MYCOSTATIN) 100,000 unit/gram cream, , Topical, BID, Jacinda Winn MD 
  sodium chloride (NS) flush 5-40 mL, 5-40 mL, IntraVENous, Q8H, Brandy Layton, PA, 10 mL at 08/28/20 0521 
  sodium chloride (NS) flush 5-40 mL, 5-40 mL, IntraVENous, PRN, LORNA Masters, 10 mL at 08/27/20 7731   naloxone (NARCAN) injection 0.1 mg, 0.1 mg, IntraVENous, PRN, LORNA Masters 
  diphenhydrAMINE (BENADRYL) injection 12.5 mg, 12.5 mg, IntraVENous, Q6H PRN, LORNA Masters 
   bisacodyL (DULCOLAX) tablet 10 mg, 10 mg, Oral, DAILY, [facility] Zaira Kennedy, [address] 
  0.9% sodium chloride infusion, 20 mL/hr, IntraVENous, CONTINUOUS, White, Jes Ceballos, NP, Last Rate: 20 mL/hr at 08/27/20 0651, 20 mL/hr at 08/27/20 7814   hydrALAZINE (APRESOLINE) 20 mg/mL injection 20 mg, 20 mg, IntraVENous, Q6H PRN, Angela Monroy MD, 20 mg at 08/28/20 3092   hydrALAZINE (APRESOLINE) tablet 100 mg, 100 mg, Oral, TID, Angela Monroy MD, 100 mg at 08/28/20 4839   busPIRone (BUSPAR) tablet 10 mg, 10 mg, Oral, BID, Dolores Leon M, DO, 10 mg at 08/28/20 2322   dilTIAZem ER (CARDIZEM CD) capsule 180 mg, 180 mg, Oral, DAILY, Dolores Cordonoln M, DO, 180 mg at 08/28/20 9299   sertraline (ZOLOFT) tablet 75 mg, 75 mg, Oral, DAILY, Dolores Cordonoln M, DO, 75 mg at 08/28/20 8099   sodium chloride (NS) flush 5-40 mL, 5-40 mL, IntraVENous, Q8H, Cleo Krueger, DO, 10 mL at 08/28/20 0521 
  sodium chloride (NS) flush 5-40 mL, 5-40 mL, IntraVENous, PRN, Sebas Sharp, [employer] M, DO 
  acetaminophen (TYLENOL) tablet 650 mg, 650 mg, Oral, Q6H PRN, 650 mg at 08/21/20 1602 **OR** acetaminophen (TYLENOL) suppository 650 mg, 650 mg, Rectal, Q6H PRN, Sebas Sharp, Jaylene M, DO 
  polyethylene glycol (MIRALAX) packet 17 g, 17 g, Oral, DAILY PRN, Sebas Sharp, [employer] M, DO 
  promethazine (PHENERGAN) tablet 12.5 mg, 12.5 mg, Oral, Q6H PRN **OR** ondansetron (ZOFRAN) injection 4 mg, 4 mg, IntraVENous, Q6H PRN, Dolores GRIMES, DO 
  Vancomycin - pharmacy to dose, , Other, Rx Dosing/Monitoring, JULY [employer] M, DO 
  glucose chewable tablet 16 g, 4 Tab, Oral, PRN, JULY Rush M, DO 
  glucagon (GLUCAGEN) injection 1 mg, 1 mg, IntraMUSCular, PRN, Dolores GRIMES, DO 
  dextrose 10% infusion 0-250 mL, 0-250 mL, IntraVENous, PRN, JULY Rush M, DO 
  gabapentin (NEURONTIN) capsule 100 mg, 100 mg, Oral, TID PRN, Dolores GRIMES, DO 
  insulin regular (NOVOLIN R, HUMULIN R) injection, , SubCUTAneous, AC&HS, Soo GRIMES DO, Stopped at 08/28/20 0730 Labs: 
Recent Results (from the past 24 hour(s)) GLUCOSE, POC Collection Time: 08/27/20 11:49 AM  
Result Value Ref Range Glucose (POC) 147 (H) 65 - 100 mg/dL Performed by Nga Barragan GLUCOSE, POC Collection Time: 08/27/20  4:31 PM  
Result Value Ref Range Glucose (POC) 156 (H) 65 - 100 mg/dL Performed by [facility]  PCT   
VANCOMYCIN, RANDOM Collection Time: 08/27/20  6:43 PM  
Result Value Ref Range Vancomycin, random 14.2 UG/ML  
GLUCOSE, POC Collection Time: 08/27/20  9:19 PM  
Result Value Ref Range Glucose (POC) 219 (H) 65 - 100 mg/dL Performed by Kenny Cordova RN   
METABOLIC PANEL, BASIC Collection Time: 08/28/20  3:04 AM  
Result Value Ref Range Sodium 139 136 - 145 mmol/L Potassium 4.2 3.5 - 5.1 mmol/L Chloride 112 (H) 97 - 108 mmol/L  
 CO2 19 (L) 21 - 32 mmol/L Anion gap 8 5 - 15 mmol/L Glucose 92 65 - 100 mg/dL BUN 19 6 - 20 MG/DL Creatinine 1.26 (H) 0.55 - 1.02 MG/DL  
 BUN/Creatinine ratio 15 12 - 20 GFR est AA 53 (L) >60 ml/min/1.73m2 GFR est non-AA 43 (L) >60 ml/min/1.73m2 Calcium 8.0 (L) 8.5 - 10.1 MG/DL PROTHROMBIN TIME + INR Collection Time: 08/28/20  3:04 AM  
Result Value Ref Range INR 1.1 0.9 - 1.1 Prothrombin time 11.0 9.0 - 11.1 sec GLUCOSE, POC Collection Time: 08/28/20  8:41 AM  
Result Value Ref Range Glucose (POC) 115 (H) 65 - 100 mg/dL Performed by Laura Watson Micro:  
 
Wound 8/25/20 Specimen Information:  Tissue STERNUM   
     
Component  Value  Ref Range & Units  Status Special Requests:  MANUBRIUM   Final   
GRAM STAIN  RARE WBCS SEEN     Final   
GRAM STAIN  NO ORGANISMS SEEN     Final   
Culture result: Abnormal        Final   
LIGHT STAPHYLOCOCCUS AUREUS Susceptibility Staphylococcus aureus BRITTON Ciprofloxacin ($)  <=0.5 ug/mL  S Clindamycin ($)  0.25 ug/mL  S    
 Daptomycin ($$$$$)  0.25 ug/mL  S Doxycycline ($$)  <=0.5 ug/mL  S Erythromycin ($$$$)  <=0.25 ug/mL  S Gentamicin ($)  <=0.5 ug/mL  S Levofloxacin ($)  <=0.12 ug/mL  S Linezolid ($$$$$)  2 ug/mL  S Moxifloxacin ($$$$)  <=0.25 ug/mL  S Oxacillin  <=0.25 ug/mL  S Rifampin ($$$$)  <=0.5 ug/mL  S1 Tetracycline  <=1 ug/mL  S Trimeth/Sulfa  <=10 ug/mL  S Vancomycin ($)  1 ug/mL  S Blood: 8/20/20 Specimen Information:  Blood   
     
Component  Value  Ref Range & Units  Status Special Requests:  NO SPECIAL REQUESTS     Preliminary Culture result:  NO GROWTH 4 DAYS Wound Sternoclavicular joint Tissue RIGHT STERNOCLAVICULAR JOINT A   
     
Component  Value  Ref Range & Units  Status Special Requests:  NO SPECIAL REQUESTS     Final   
GRAM STAIN  NO WBC'S SEEN     Final   
GRAM STAIN  NO ORGANISMS SEEN     Final   
Culture result: Abnormal        Final   
HEAVY STAPHYLOCOCCUS AUREUS Susceptibility Staphylococcus aureus BRITTON Ciprofloxacin ($)  <=0.5 ug/mL  S Clindamycin ($)  0.25 ug/mL  S Daptomycin ($$$$$)  0.25 ug/mL  S Doxycycline ($$)  <=0.5 ug/mL  S Erythromycin ($$$$)  <=0.25 ug/mL  S Gentamicin ($)  <=0.5 ug/mL  S Levofloxacin ($)  <=0.12 ug/mL  S Linezolid ($$$$$)  2 ug/mL  S Moxifloxacin ($$$$)  <=0.25 ug/mL  S Oxacillin  <=0.25 ug/mL  S Rifampin ($$$$)  <=0.5 ug/mL  S1 Tetracycline  <=1 ug/mL  S Trimeth/Sulfa  <=10 ug/mL  S Vancomycin ($)  1 ug/mL  S Component  Value  Ref Range & Units  Status Special Requests:    Final   
RIGHT STERNOCLAVICULAR JOINT ABSCESS Culture result:  NO ANAEROBES ISOLATED     Final   
Result History Tissue RIGHT STERNOCLAVICULAR JOINT A   
     
Component  Value  Ref Range & Units  Status Special Requests:  NO SPECIAL REQUESTS     Final   
GRAM STAIN  NO WBC'S SEEN     Final   
 GRAM STAIN  NO ORGANISMS SEEN     Final   
Culture result: Abnormal        Final   
HEAVY STAPHYLOCOCCUS AUREUS Susceptibility Staphylococcus aureus BRITTON Ciprofloxacin ($)  <=0.5 ug/mL  S Clindamycin ($)  0.25 ug/mL  S Daptomycin ($$$$$)  0.25 ug/mL  S Doxycycline ($$)  <=0.5 ug/mL  S Erythromycin ($$$$)  <=0.25 ug/mL  S Gentamicin ($)  <=0.5 ug/mL  S Levofloxacin ($)  <=0.12 ug/mL  S Linezolid ($$$$$)  2 ug/mL  S Moxifloxacin ($$$$)  <=0.25 ug/mL  S Oxacillin  <=0.25 ug/mL  S Rifampin ($$$$)  <=0.5 ug/mL  S1 Tetracycline  <=1 ug/mL  S Trimeth/Sulfa  <=10 ug/mL  S Vancomycin ($)  1 ug/mL  S Component  Value  Ref Range & Units  Status Special Requests:    Final   
RIGHT STERNOCLAVICULAR JOINT ABSCESS Culture result:  NO ANAEROBES ISOLATED     Final   
Result History CULTURE, ANAEROBIC on 8/22/2020 Imaging: 
CT chest 8/19/20 
  
FINDINGS: 
  
CHEST WALL: No mass or axillary lymphadenopathy. THYROID: No nodule. MEDIASTINUM: No mass or lymphadenopathy. ASHLYN: No mass or lymphadenopathy. THORACIC AORTA: No aneurysm. MAIN PULMONARY ARTERY: Normal in caliber. TRACHEA/BRONCHI: Patent. ESOPHAGUS: No wall thickening or dilatation. HEART: Coronary artery calcifications are present. PLEURA: No effusion or pneumothorax. LUNGS: No nodule, mass, or airspace disease. INCIDENTALLY IMAGED UPPER ABDOMEN: There is a right adrenal adenoma measuring 2.8 cm. BONES: There is inflammation involving the right sternoclavicular joint. There 
is no focal fluid collection. 
  
IMPRESSION IMPRESSION: 
Suspect septic arthritis of the right sternoclavicular joint. No drainable fluid 
collection. Assessment / Plan Ms. Sherrell Avila is a 65-year-old lady with a history of diabetes, hypertension, A. 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 
who was transferred from [facility] for right sternoclavicular joint osteomyelitis//septic joint. She underwent incision and debridement of the right sternoclavicular joint, resection of the middle third of the right clavicle and placement of wound VAC on 8/20/2020. Operative findings include \" jacinta purulence within the [place] joint which was eroded\" Her blood culture on 8/20/2020 was negative but wound cultures from [place] joint debridement were positive for MSSA She is currently on IV vancomycin She reports a history of myalgias and muscle issues while on daptomycin previously She lists an allergy to antibiotics including Bactrim as well as penicillin. She had hives with penicillin. Cephalosporin was attempted and she says she passed out almost and had no idea what was happening to her when she received cefepime in the past. 
 
1) sternoclavicular joint abscess, osteomyelitis History of MSSA bacteremia, pacer vegetation status post removal and endocarditis treatment in April to May 2020 Left foot osteomyelitis status post amputation/BKA May 2020 I discussed with the patient that vancomycin is  not the best medication for MSSA. However given her issues with antibiotics in the past to cephalosporins, penicillin and daptomycin,  we will continue with IV vancomycin as choices are limited Will plan on 6 weeks of antibiotics for sternoclavicular osteomyelitis till 10/5/20 check TTE Renally dose vancomycin by pharmacy for trough goal of 15-20 . CM orders for antibiotics writtein F/U wt me in 2 weeks of DC Line per primary team and removal once IV therapy completed Antibiotic side effects/adverse effects/toxicities discussed including gastrointestinal, renal, hematological , ability to lower siezure threshold, risk for C diff infection. Probiotics, daily yogurt encouraged. Check weekly labs for CBC with differential, BMP, ESR, CRP and vancomycin trough Vancomycin dosing to be adjusted by pharmacy for trough goal of 15-20. Vanc random level 18 today One other option is p.o. Zyvox but patient usually do not tolerate 6 weeks of Zyvox secondary to bone marrow suppression and also has drug drug interactions . May consider later in treatment course if unable to tolerate Vancomycin IV 
 
 
 
 
2) A. fib 3) PARAG Renally dose vancomycin 4) DVT prophylaxis 5) screening hep C antibody negative in March 2020 Check HIV-patient verbally consented Thank you for the opportunity to participate in the care of this patient. Please contact with questions or concerns.    
 
Michelle Licona DO  
9:27 AM

## 2020-08-28 NOTE — PROGRESS NOTES
Problem: Pain Goal: *Control of Pain Outcome: Progressing Towards Goal 
 
Changed dilaudid from 1mg to 2 mg Problem: Pressure Injury - Risk of 
Goal: *Prevention of pressure injury Description: Document Valdemar Scale and appropriate interventions in the flowsheet. Outcome: Progressing Towards Goal 
Note: Pressure Injury Interventions: 
Sensory Interventions: Assess changes in LOC, Float heels, Keep linens dry and wrinkle-free, Minimize linen layers, Monitor skin under medical devices, Turn and reposition approx. every two hours (pillows and wedges if needed), Pressure redistribution bed/mattress (bed type) Moisture Interventions: Absorbent underpads, Apply protective barrier, creams and emollients, Limit adult briefs, Maintain skin hydration (lotion/cream), Minimize layers, Moisture barrier Activity Interventions: Increase time out of bed, Pressure redistribution bed/mattress(bed type), PT/OT evaluation, Assess need for specialty bed Mobility Interventions: HOB 30 degrees or less, Pressure redistribution bed/mattress (bed type), PT/OT evaluation, Turn and reposition approx. every two hours(pillow and wedges) Nutrition Interventions: Document food/fluid/supplement intake, Discuss nutritional consult with provider Friction and Shear Interventions: Apply protective barrier, creams and emollients, Feet elevated on foot rest, Minimize layers Daily skin assessment completed, R foot swelling, + abrasion noted, wound care consult placed Problem: Sepsis: Day 6 Goal: Diagnostic Test/Procedures Outcome: Progressing Towards Goal 
Daily labs, intake and output Goal: Medications Outcome: Progressing Towards Goal 
Vancomycin

## 2020-08-28 NOTE — PROGRESS NOTES
Transition Plan of Care RUR-30% Consult noted for rehab and IV antibiotics at discharge. Spoke with patient and she would like a referral sent to [facility]. Sent via [employer]. Sarita Moura RN CRM Ext [phone]

## 2020-08-29 NOTE — PROGRESS NOTES
Problem: Risk for Spread of Infection Goal: Prevent transmission of infectious organism to others Outcome: Progressing Towards Goal 
Pt on contact precautions for ESBL and MSSA Problem: Falls - Risk of 
Goal: Absence of Falls Outcome: Progressing Towards Goal 
Call light in reach, Patient to call for help with toileting needs, Stay With Me (per policy), Toileting schedule/hourly rounds Problem: Pain Goal: Control of Pain Outcome: Progressing Towards Goal 
Pt on PRN Dilaudid every 4 hours for post op shoulder pain 
 
 
 
  
Problem: Pressure Injury - Risk of 
Goal: Prevention of pressure injury Outcome: Progressing Towards Goal 
Assess changes in LOC, Keep linens dry and wrinkle-free, Pressure redistribution bed/mattress (bed type), Minimize linen layers, Discuss PT/OT consult with provider, Check visual cues for pain, Avoid rigorous massage over bony prominences, pt states that she can turn herself, declined assistance with nursing Hourly rounding done, pt in A Fib, on room air, O2 saturation greater than 90%, has pure wick, urine yellow and clear, post op shoulder pain treated with PRN Dilaudid every 4 hours 0800-Bedside shift change report given to Era FAIRCHILD (oncoming nurse) by Alesha Abrams RN (offgoing nurse). Report included the following information SBAR, Kardex, ED Summary, Procedure Summary, Intake/Output, MAR, Accordion, Recent Results, Med Rec Status and Cardiac Rhythm A Fib.

## 2020-08-29 NOTE — PROGRESS NOTES
Pharmacist Note  Warfarin Dosing Consult provided for this 60 y. o.female to manage warfarin for Atrial Fibrillation INR Goal: 2 - 3 Home regimen/ tablet size: 2 mg PO daily Drugs that may increase INR: None Drugs that may decrease INR: None Other current anticoagulants/ drugs that may increase bleeding risk: Sertraline Risk factors: None Daily INR ordered: YES Recent Labs  
  08/29/20 
0453 08/28/20 
0304 08/27/20 
0550 HGB  --   --  9.2* INR 1.1 1.1  --   
 
Date               INR                  Dose 8/27  --  3 mg  
8/28                1.1                   3 mg 
8/29                1.1                   3 mg Assessment/ Plan: Will order warfarin 3 mg PO x 1 dose. Except poor compliance prior to arrival. Will dose once more above home regimen until INR begins to trend up. Pharmacy will continue to monitor daily and adjust therapy as indicated. Please contact the pharmacist at  for outpatient recommendations if needed. Ziyad Gandhi, [address] [facility]

## 2020-08-29 NOTE — PROGRESS NOTES
[address] [facility] Adult  Hospitalist Group Hospitalist Progress Note Evelyn Mendez MD 
Answering service: [phone] OR 36 from in house phone NAME:  Thi Virgen :  1960 MRN:  [id number] Admission Summary: A 60year old female with past medical history HTN, DM Type II on insulin, Atrial fibrillation/flutter, CKD stage III, recent admission 2020-2020 for left foot cellulitis/OM s/p left BKA, bacteremia MSSA, endocarditis, Pacemaker vegetation s/p lead extraction, presenting to [facility] as a direct transfer from [facility] emergency department for thoracic surgery evaluation.  Patient developed right-sided chest pain/tenderness radiating to right shoulder.  Seen at urgent care and found to have atrial fibrillation with RVR. Sent to the emergency department for further evaluation.  In the ED, CT chest demonstrated septic arthritis of the right sternal clavicular joint and no abscess.  Given Merrem and vancomycin and transferred to [facility] 2020 
  
Interval history / Subjective:  
 
Patient seen and examined at the bedside Some issues with pain control- increased pain med doses today Followed by ID and thoracic surgery 
-COVID 19 test negative Assessment & Plan:  
 
Sepsis secondary to septic right sternoclavicular joint and abscess- resolved  
-Recent complicated hospitalization with MSSA bacteremia/endocarditis - Incision and debridement of right sternoclavicular joint.  Resection of medial third of right clavicle.  Placement of wound VAC on  
- OR cx: MSSA 
- on iv vancomycin - pain control with dilaudid - Thoracic surgery and ID consulted and following 
  
Atrial fibrillation with RVR 
-rate controlled, off Cardizem drip, continue with oral diltiazem 
- HR still in 100's  
restarted on warfarin- pharmacy to dose Hx of pacemaker lead vegetation and s/p removal of device 
-stable, continue cardiac monitoring 
  
PARAG on CKD stage III  
-creatinine improving 
- secondary to sepsis Back to baseline- stopped fluids and restarting bumex in am tomorrow 
  
T2DM- hyperglycemia 
-restart long-acting, continue SSI. A1c 6.1, monitor finger stick glucose 
  
HTN  
- BP stable on current med regimen 
  
Depression 
-stable, continue buspirone, Zoloft Vaginal yeast infection- starting diflucan Code status:Full Code DVT prophylaxis:SCD Care Plan discussed with: Patient/Family, Nurse and  Anticipated Disposition: TBD Anticipated Discharge: Greater than 48 hours Hospital Problems  Date Reviewed: 8/24/2020 Codes Class Noted POA Disorder of sternoclavicular joint ICD-10-CM: M25.9 ICD-9-CM: 719.91  8/21/2020 Unknown * (Principal) Sepsis ([facility] [address] ICD-10-CM: A41.9 ICD-9-CM: 038.9, 995.91  8/20/2020 Yes Vital Signs:  
 Last 24hrs VS reviewed since prior progress note. Most recent are: 
Visit Vitals /48 (BP 1 Location: Left arm, BP Patient Position: At rest) Pulse 91 Temp 98.1 °F (36.7 °C) Resp 17 Ht 5' 10\" (1.778 m) Wt 110.8 kg (244 lb 4.8 oz) SpO2 99% BMI 35.05 kg/m² Intake/Output Summary (Last 24 hours) at 8/29/2020 1746 Last data filed at 8/29/2020 1202 Gross per 24 hour Intake 81 ml Output 1781 ml Net -1700 ml Physical Examination:  
 
 
     
Constitutional:  No acute distress, cooperative, pleasant ENT:  Oral mucosa moist, oropharynx benign. Resp: 
Chest:  CTA bilaterally. No wheezing/rhonchi/rales. No accessory muscle use 
r chest incision without erythma- staples intact CV:  Regular rhythm, normal rate, no murmurs, gallops, rubs GI:  Soft, non distended, non tender. normoactive bowel sounds, no hepatosplenomegaly Musculoskeletal:  No edema, left BKA Neurologic:  Moves all extremities. AAOx3, CN II-XII reviewed Skin:  Good turgor, no rashes or ulcers Data Review:  
 Review and/or order of clinical lab test 
Review and/or order of tests in the radiology section of CPT Review and/or order of tests in the medicine section of CPT Labs:  
 
Recent Labs  
  08/27/20 
0550 WBC 9.4 HGB 9.2* HCT 30.6* * Recent Labs  
  08/28/20 
0304 08/27/20 
0550  137  
K 4.2 4.8  
* 109* CO2 19* 21 BUN 19 23* CREA 1.26* 1.34* GLU 92 121* CA 8.0* 8.5 No results for input(s): ALT, AP, TBIL, TBILI, TP, ALB, GLOB, GGT, AML, LPSE in the last 72 hours. No lab exists for component: SGOT, GPT, AMYP, HLPSE Recent Labs  
  08/29/20 
0453 08/28/20 
0304 INR 1.1 1.1 PTP 11.1 11.0 No results for input(s): FE, TIBC, PSAT, FERR in the last 72 hours. Lab Results Component Value Date/Time Folate 8.5 03/14/2020 02:49 PM  
  
No results for input(s): PH, PCO2, PO2 in the last 72 hours. No results for input(s): CPK, CKNDX, TROIQ in the last 72 hours. No lab exists for component: CPKMB Lab Results Component Value Date/Time Cholesterol, total 141 11/11/2019 08:47 AM  
 HDL Cholesterol 32 (L) 11/11/2019 08:47 AM  
 LDL, calculated 82 11/11/2019 08:47 AM  
 Triglyceride 137 11/11/2019 08:47 AM  
 
Lab Results Component Value Date/Time Glucose (POC) 136 (H) 08/29/2020 04:57 PM  
 Glucose (POC) 149 (H) 08/29/2020 11:59 AM  
 Glucose (POC) 136 (H) 08/29/2020 08:34 AM  
 Glucose (POC) 180 (H) 08/28/2020 11:57 PM  
 Glucose (POC) 133 (H) 08/28/2020 05:44 PM  
 
Lab Results Component Value Date/Time  Color YELLOW/STRAW 05/23/2020 02:35 PM  
 Appearance CLOUDY (A) 05/23/2020 02:35 PM  
 Specific gravity 1.009 05/23/2020 02:35 PM  
 pH (UA) 6.0 05/23/2020 02:35 PM  
 Protein Negative 05/23/2020 02:35 PM  
 Glucose Negative 05/23/2020 02:35 PM  
 Ketone Negative 05/23/2020 02:35 PM  
 Bilirubin Negative 05/23/2020 02:35 PM  
 Urobilinogen 0.2 05/23/2020 02:35 PM  
 Nitrites Positive (A) 05/23/2020 02:35 PM  
 Leukocyte Esterase LARGE (A) 05/23/2020 02:35 PM  
 Epithelial cells FEW 05/23/2020 02:35 PM  
 Bacteria 4+ (A) 05/23/2020 02:35 PM  
 WBC >100 (H) 05/23/2020 02:35 PM  
 RBC 0-5 05/23/2020 02:35 PM  
 
 
 
Medications Reviewed:  
 
Current Facility-Administered Medications Medication Dose Route Frequency  vancomycin (VANCOCIN) 1250 mg in  ml infusion  1,250 mg IntraVENous Q48H  fluconazole (DIFLUCAN) tablet 200 mg  200 mg Oral DAILY  HYDROmorphone (DILAUDID) tablet 2 mg  2 mg Oral Q4H PRN  
 bumetanide (BUMEX) tablet 2 mg  2 mg Oral DAILY  Warfarin- Pharmacy Dosing   Other Rx Dosing/Monitoring  HYDROmorphone (PF) (DILAUDID) injection 0.5 mg  0.5 mg IntraVENous Q3H PRN  
 nystatin (MYCOSTATIN) 100,000 unit/gram cream   Topical BID  sodium chloride (NS) flush 5-40 mL  5-40 mL IntraVENous Q8H  
 sodium chloride (NS) flush 5-40 mL  5-40 mL IntraVENous PRN  
 naloxone (NARCAN) injection 0.1 mg  0.1 mg IntraVENous PRN  
 diphenhydrAMINE (BENADRYL) injection 12.5 mg  12.5 mg IntraVENous Q6H PRN  
 bisacodyL (DULCOLAX) tablet 10 mg  10 mg Oral DAILY  hydrALAZINE (APRESOLINE) 20 mg/mL injection 20 mg  20 mg IntraVENous Q6H PRN  
 hydrALAZINE (APRESOLINE) tablet 100 mg  100 mg Oral TID  busPIRone (BUSPAR) tablet 10 mg  10 mg Oral BID  dilTIAZem ER (CARDIZEM CD) capsule 180 mg  180 mg Oral DAILY  sertraline (ZOLOFT) tablet 75 mg  75 mg Oral DAILY  sodium chloride (NS) flush 5-40 mL  5-40 mL IntraVENous Q8H  
 sodium chloride (NS) flush 5-40 mL  5-40 mL IntraVENous PRN  
 acetaminophen (TYLENOL) tablet 650 mg  650 mg Oral Q6H PRN Or  
 acetaminophen (TYLENOL) suppository 650 mg  650 mg Rectal Q6H PRN  polyethylene glycol (MIRALAX) packet 17 g  17 g Oral DAILY PRN  promethazine (PHENERGAN) tablet 12.5 mg  12.5 mg Oral Q6H PRN  Or  
 ondansetron (ZOFRAN) injection 4 mg  4 mg IntraVENous Q6H PRN  
  Vancomycin - pharmacy to dose   Other Rx Dosing/Monitoring  glucose chewable tablet 16 g  4 Tab Oral PRN  
 glucagon (GLUCAGEN) injection 1 mg  1 mg IntraMUSCular PRN  
 dextrose 10% infusion 0-250 mL  0-250 mL IntraVENous PRN  
 gabapentin (NEURONTIN) capsule 100 mg  100 mg Oral TID PRN  
 insulin regular (NOVOLIN R, HUMULIN R) injection   SubCUTAneous AC&HS  
 
______________________________________________________________________ EXPECTED LENGTH OF STAY: 3d 17h ACTUAL LENGTH OF STAY:          9 Lucía Kwan MD

## 2020-08-29 NOTE — PROGRESS NOTES
Day # 10 of Vancomycin Indication:  suspected infected sternoclavicular joint; recent complicated hospitalization with MSSA bacteremia/endocarditis; s/p Incision and debridement of right sternoclavicular joint; resection of medial third of right clavicle; placement of wound VAC; wound cxs here growing MSSA but patient has penicillin allergy and refusing cephalosporins Current regimen:  Dosing by levels (last dose  @ ) Abx regimen:  Vanc 1.25g q48h ID Following ?: YES Concomitant nephrotoxic drugs (requires more frequent monitoring): None Frequency of BMP?: Daily Recent Labs  
  20 
0304 20 
0550 WBC  --  9.4 CREA 1.26* 1.34* BUN 19 23* Est CrCl: ~60-65 ml/min; UO: ~0.6 mL/kg/hr Temp (24hrs), Av.6 °F (37 °C), Min:98.3 °F (36.8 °C), Max:98.7 °F (37.1 °C) Cultures:  
 Blood-NGTD 
 Wound (Right sternoclavicular joint) x2 - heavy MSSA, final 
 Sternum tissue cx- light MSSA, final 
 
Goal trough = ~15 mcg/mL Recent trough history (date/time/level/dose/action taken): 
 @ 0023 = 24 mcg/mL (~25 hours post-dose on 1250mg Q24h), extrapolated \"true trough\" = 24.5 mcg/mL, dose given but additional orders removed  @ 1153= 22.4 mcg/mL (~36 hours from previous dose given on  @ 0032)- dose held  @ 0331= 18.6 mcg/ml ( ~51 hrs  from previous dose  @ 0032)- 1250 mg dose given on  @ 1531 (~12 hours after level) to allow additional clearance of drug  @ 1843= 14.2 mcg/mL (~51 hours from previous dose  @ 153)- 1250 mg IV x 1 given Renal function continues to improve- will schedule a maintenance dose of 1250 mg Q 48 hours- ID has written outpatient orders - therapy to continue until 10/5. Plan: Continue current regimen. Will assess dosing once at steady state or sooner if clinically indicated. Will continue to trend Scr and urine output. Damon Cano, 161 [facility]

## 2020-08-29 NOTE — PROGRESS NOTES
Problem: Risk for Spread of Infection Goal: Prevent transmission of infectious organism to others Description: Prevent the transmission of infectious organisms to other patients, staff members, and visitors. Outcome: Progressing Towards Goal 
  
Problem: Patient Education:  Go to Education Activity Goal: Patient/Family Education Outcome: Progressing Towards Goal 
  
Problem: Patient Education: Go to Patient Education Activity Goal: Patient/Family Education Outcome: Progressing Towards Goal 
  
Problem: Pain Goal: *Control of Pain Outcome: Progressing Towards Goal 
Goal: *PALLIATIVE CARE:  Alleviation of Pain Outcome: Progressing Towards Goal 
  
Problem: Pressure Injury - Risk of 
Goal: *Prevention of pressure injury Description: Document Valdemar Scale and appropriate interventions in the flowsheet. Outcome: Progressing Towards Goal 
Note: Pressure Injury Interventions: 
Sensory Interventions: Avoid rigorous massage over bony prominences, Discuss PT/OT consult with provider, Float heels, Keep linens dry and wrinkle-free, Minimize linen layers, Monitor skin under medical devices, Turn and reposition approx. every two hours (pillows and wedges if needed) Moisture Interventions: Apply protective barrier, creams and emollients, Check for incontinence Q2 hours and as needed, Limit adult briefs, Minimize layers, Maintain skin hydration (lotion/cream) Activity Interventions: Assess need for specialty bed, Increase time out of bed, PT/OT evaluation Mobility Interventions: Assess need for specialty bed, Float heels, PT/OT evaluation, Turn and reposition approx. every two hours(pillow and wedges) Nutrition Interventions: Document food/fluid/supplement intake, Discuss nutritional consult with provider Friction and Shear Interventions: Apply protective barrier, creams and emollients, Lift sheet, Lift team/patient mobility team, Minimize layers Problem: Patient Education: Go to Patient Education Activity Goal: Patient/Family Education Outcome: Progressing Towards Goal 
  
Problem: Patient Education: Go to Patient Education Activity Goal: Patient/Family Education Outcome: Progressing Towards Goal 
  
Problem: Sepsis: Day 2 Goal: Respiratory Outcome: Progressing Towards Goal 
 
Bedside and Verbal shift change report given to Adelaide (oncoming nurse) by Roby Aparicio (offgoing nurse). Report included the following information SBAR, Kardex, MAR, Recent Results and Cardiac Rhythm AFIB. Patient Vitals for the past 12 hrs: 
 Temp Pulse Resp BP SpO2  
08/29/20 1855 98.4 °F (36.9 °C) 96 15 169/44   
08/29/20 1601 98.1 °F (36.7 °C) 91 17 160/48 99 % 08/29/20 1202 98 °F (36.7 °C) 95 14 165/57 99 % 08/29/20 0846  (!) 105  170/89 99 % 08/29/20 0705 98.3 °F (36.8 °C) 100 18 157/77 100 %

## 2020-08-29 NOTE — PROGRESS NOTES
Doing well Incision is CDI, skin edges healthy, minimal swelling YOANA serous Plan to continue PT OT Keep drains as are

## 2020-08-30 NOTE — PROGRESS NOTES
Problem: Falls - Risk of 
Goal: Absence of Falls Outcome: Progressing Towards Goal 
Call light in reach, Patient to call for help with toileting needs, Stay With Me (per policy), Toileting schedule/hourly rounds 
  
  
  
  
Problem: Pain Goal: Control of Pain Outcome: Progressing Towards Goal 
Pt on Dilaudid for pain control  
  
  
  
Problem: Nutrition Deficit Goal: Optimize nutritional status Outcome: Progressing Towards Goal 
Pt on a diabetic consistent carb, tolerating well 
  
  
  
Problem: Pressure Injury - Risk of 
Goal: Prevention of pressure injury Outcome: Progressing Towards Goal 
Assess changes in LOC, Discuss PT/OT consult with provider, Keep linens dry and wrinkle-free, Minimize linen layers, Pressure redistribution bed/mattress (bed type), Assisted patient to  turn herself. 
  
  
  
  
Problem: Sepsis: Day 6 Goal: Off Pathway (Use only if patient is Off Pathway) Outcome: Progressing Towards Goal 
Pt on vancomycin every 48 hours   
  
  
Verbal report given to [facility]) on Paz Jc. 
  
Report consisted of patients Situation, Background, Assessment and  
Recommendations(SBAR).   
Information from the following report(s) SBAR, Kardex, ED Summary, Intake/Output, MAR, Accordion, Recent Results, Med Rec Status and Cardiac Rhythm A fib was reviewed with the receiving nurse.

## 2020-08-30 NOTE — PROGRESS NOTES
Problem: Falls - Risk of 
Goal: Absence of Falls Outcome: Progressing Towards Goal 
Call light in reach, Patient to call for help with toileting needs, Stay With Me (per policy), Toileting schedule/hourly rounds Problem: Pain Goal: Control of Pain Outcome: Progressing Towards Goal 
Pt on Dilaudid for pain control Problem: Nutrition Deficit Goal: Optimize nutritional status Outcome: Progressing Towards Goal 
Pt on a diabetic consistent carb, tolerating well Problem: Pressure Injury - Risk of 
Goal: Prevention of pressure injury Outcome: Progressing Towards Goal 
Assess changes in LOC, Discuss PT/OT consult with provider, Keep linens dry and wrinkle-free, Minimize linen layers, Pressure redistribution bed/mattress (bed type), pt states that she can turn herself, declined assistance with turning Problem: Sepsis: Day 6 Goal: Off Pathway (Use only if patient is Off Pathway) Outcome: Progressing Towards Goal 
Pt on vancomycin every 48 hours 2125-TRANSFER - OUT REPORT: 
 
Verbal report given to Gisel FAIRCHILD(name) on Lisbeth Rojas  being transferred to (unit) for routine progression of care Report consisted of patients Situation, Background, Assessment and  
Recommendations(SBAR). Information from the following report(s) SBAR, Kardex, ED Summary, Intake/Output, MAR, Accordion, Recent Results, Med Rec Status and Cardiac Rhythm A fib was reviewed with the receiving nurse. Lines:  
Peripheral IV 08/22/20 Arm (Active) Site Assessment Clean, dry, & intact 08/29/20 2246 Phlebitis Assessment 0 08/29/20 2000 Infiltration Assessment 0 08/29/20 2000 Dressing Status Clean, dry, & intact 08/29/20 2000 Dressing Type Transparent;Tape 08/29/20 2000 Hub Color/Line Status Pink;Flushed;Capped 08/29/20 2000 Action Taken Open ports on tubing capped 08/29/20 2000 Alcohol Cap Used Yes 08/29/20 2000 Peripheral IV 08/26/20 Left Hand (Active) Site Assessment Clean, dry, & intact 08/29/20 2000 Phlebitis Assessment 0 08/29/20 2000 Infiltration Assessment 0 08/29/20 2000 Dressing Status Clean, dry, & intact 08/29/20 2000 Dressing Type Transparent;Tape 08/29/20 2000 Hub Color/Line Status Blue;Capped;Flushed 08/29/20 2000 Action Taken Open ports on tubing capped 08/29/20 2000 Alcohol Cap Used Yes 08/29/20 2000 Opportunity for questions and clarification was provided. Patient transported with: 
 [employer]

## 2020-08-30 NOTE — PROGRESS NOTES
Day # 11 of Vancomycin Indication:  suspected infected sternoclavicular joint; recent complicated hospitalization with MSSA bacteremia/endocarditis; s/p Incision and debridement of right sternoclavicular joint; resection of medial third of right clavicle; placement of wound VAC; wound cxs here growing MSSA but patient has penicillin allergy and refusing cephalosporins Current regimen:  Vanc 1.25g q48h Abx regimen:  vanc monotherapy ID Following ?: YES Concomitant nephrotoxic drugs (requires more frequent monitoring): None Frequency of BMP?: Daily Recent Labs 20 
2589 20 
2712 CREA 1.54* 1.26* BUN 19 19 Est CrCl: 50-55 ml/min; UO: ~0.7 mL/kg/hr Temp (24hrs), Av.5 °F (36.9 °C), Min:98.1 °F (36.7 °C), Max:99.2 °F (37.3 °C) Cultures:  
 Blood-NGTD 
 Wound (Right sternoclavicular joint) x2 - heavy MSSA, final 
 Sternum tissue cx- light MSSA, final 
 
Goal trough = ~15 mcg/mL Recent trough history (date/time/level/dose/action taken): 
 @ 0023 = 24 mcg/mL (~25 hours post-dose on 1250mg Q24h), extrapolated \"true trough\" = 24.5 mcg/mL, dose given but additional orders removed  @ 1153= 22.4 mcg/mL (~36 hours from previous dose given on  @ 0032)- dose held  @ 0331= 18.6 mcg/ml ( ~51 hrs  from previous dose  @ 0032)- 1250 mg dose given on  @ 1531 (~12 hours after level) to allow additional clearance of drug  @ 1843= 14.2 mcg/mL (~51 hours from previous dose  @ 1531)- 1250 mg IV x 1 given Renal function continues to improve- will schedule a maintenance dose of 1250 mg Q 48 hours- ID has written outpatient orders - therapy to continue until 10/5. Plan: Continue current regimen. Will assess dosing once at steady state or sooner if clinically indicated. Will continue to trend Scr and urine output. Eryn Gatica, 161 [facility]

## 2020-08-30 NOTE — PROGRESS NOTES
Doing well Awaiting rehab bed Incision well healed, swelling pretty much resolved Will pull drains in a few days

## 2020-08-30 NOTE — PROGRESS NOTES
[address] [facility] Adult  Hospitalist Group Hospitalist Progress Note Gemma Jara MD 
Answering service: [phone] from in house phone NAME:  Alma Law :  1960 MRN:  [id number] Admission Summary: A 60year old female with past medical history HTN, DM Type II on insulin, Atrial fibrillation/flutter, CKD stage III, recent admission 2020-2020 for left foot cellulitis/OM s/p left BKA, bacteremia MSSA, endocarditis, Pacemaker vegetation s/p lead extraction, presenting to [facility] as a direct transfer from [facility] emergency department for thoracic surgery evaluation.  Patient developed right-sided chest pain/tenderness radiating to right shoulder.  Seen at urgent care and found to have atrial fibrillation with RVR. Sent to the emergency department for further evaluation.  In the ED, CT chest demonstrated septic arthritis of the right sternal clavicular joint and no abscess.  Given Merrem and vancomycin and transferred to [facility] 2020 
  
Interval history / Subjective:  
 
Patient seen and examined at the bedside 
no issues with pain control- some erratic blood glucose levels Followed by ID and thoracic surgery 
-COVID 19 test negative Assessment & Plan:  
 
Sepsis secondary to septic right sternoclavicular joint and abscess- resolved  
-Recent complicated hospitalization with MSSA bacteremia/endocarditis - Incision and debridement of right sternoclavicular joint.  Resection of medial third of right clavicle.  Placement of wound VAC on  
- OR cx: MSSA 
- on iv vancomycin - pain control with dilaudid - Thoracic surgery and ID consulted and following 
  
Atrial fibrillation with RVR 
-rate controlled, off Cardizem drip, continue with oral diltiazem 
- HR still in 100's  
restarted on warfarin- pharmacy to dose Hx of pacemaker lead vegetation and s/p removal of device 
-stable, continue cardiac monitoring 
  
PARAG on CKD stage III  
-creatinine improving 
- secondary to sepsis Back to baseline- stopped fluids and restarting bumex in am tomorrow 
  
T2DM- hyperglycemia 
-restart long-acting, continue SSI. A1c 6.1, monitor finger stick glucose 
  
HTN  
- BP stable on current med regimen 
  
Depression 
-stable, continue buspirone, Zoloft Vaginal yeast infection- starting diflucan Code status:Full Code DVT prophylaxis:SCD Care Plan discussed with: Patient/Family, Nurse and  Anticipated Disposition: TBD Anticipated Discharge: Greater than 48 hours Hospital Problems  Date Reviewed: 8/24/2020 Codes Class Noted POA Disorder of sternoclavicular joint ICD-10-CM: M25.9 ICD-9-CM: 719.91  8/21/2020 Unknown * (Principal) Sepsis ([facility] [address] ICD-10-CM: A41.9 ICD-9-CM: 038.9, 995.91  8/20/2020 Yes Vital Signs:  
 Last 24hrs VS reviewed since prior progress note. Most recent are: 
Visit Vitals /71 (BP 1 Location: Left arm, BP Patient Position: At rest) Pulse 81 Temp 98.3 °F (36.8 °C) Resp 20 Ht 5' 10\" (1.778 m) Wt 111.6 kg (246 lb 1.6 oz) SpO2 98% BMI 35.31 kg/m² Intake/Output Summary (Last 24 hours) at 8/30/2020 1657 Last data filed at 8/30/2020 1520 Gross per 24 hour Intake 240 ml Output 2021 ml Net -1781 ml Physical Examination:  
 
 
     
Constitutional:  No acute distress, cooperative, pleasant ENT:  Oral mucosa moist, oropharynx benign. Resp: 
Chest:  CTA bilaterally. No wheezing/rhonchi/rales. No accessory muscle use 
r chest incision without erythma- staples intact CV:  Regular rhythm, normal rate, no murmurs, gallops, rubs GI:  Soft, non distended, non tender. normoactive bowel sounds, no hepatosplenomegaly Musculoskeletal:  No edema, left BKA Neurologic:  Moves all extremities. AAOx3, CN II-XII reviewed Skin:  Good turgor, no rashes or ulcers Data Review:  
 Review and/or order of clinical lab test 
Review and/or order of tests in the radiology section of CPT Review and/or order of tests in the medicine section of CPT Labs:  
 
No results for input(s): WBC, HGB, HCT, PLT, HGBEXT, HCTEXT, PLTEXT, HGBEXT, HCTEXT, PLTEXT in the last 72 hours. Recent Labs 08/30/20 
7809 08/28/20 
4430  139  
K 4.1 4.2  112* CO2 22 19* BUN 19 19 CREA 1.54* 1.26* * 92  
CA 8.8 8.0* No results for input(s): ALT, AP, TBIL, TBILI, TP, ALB, GLOB, GGT, AML, LPSE in the last 72 hours. No lab exists for component: SGOT, GPT, AMYP, HLPSE Recent Labs 08/30/20 
8872 08/29/20 
3063 08/28/20 
0304 INR 1.1 1.1 1.1 PTP 11.5* 11.1 11.0 No results for input(s): FE, TIBC, PSAT, FERR in the last 72 hours. Lab Results Component Value Date/Time Folate 8.5 03/14/2020 02:49 PM  
  
No results for input(s): PH, PCO2, PO2 in the last 72 hours. No results for input(s): CPK, CKNDX, TROIQ in the last 72 hours. No lab exists for component: CPKMB Lab Results Component Value Date/Time Cholesterol, total 141 11/11/2019 08:47 AM  
 HDL Cholesterol 32 (L) 11/11/2019 08:47 AM  
 LDL, calculated 82 11/11/2019 08:47 AM  
 Triglyceride 137 11/11/2019 08:47 AM  
 
Lab Results Component Value Date/Time Glucose (POC) 152 (H) 08/30/2020 04:49 PM  
 Glucose (POC) 113 (H) 08/30/2020 12:24 PM  
 Glucose (POC) 123 (H) 08/30/2020 08:22 AM  
 Glucose (POC) 155 (H) 08/29/2020 09:17 PM  
 Glucose (POC) 136 (H) 08/29/2020 04:57 PM  
 
Lab Results Component Value Date/Time  Color YELLOW/STRAW 05/23/2020 02:35 PM  
 Appearance CLOUDY (A) 05/23/2020 02:35 PM  
 Specific gravity 1.009 05/23/2020 02:35 PM  
 pH (UA) 6.0 05/23/2020 02:35 PM  
 Protein Negative 05/23/2020 02:35 PM  
 Glucose Negative 05/23/2020 02:35 PM  
 Ketone Negative 05/23/2020 02:35 PM  
 Bilirubin Negative 05/23/2020 02:35 PM  
 Urobilinogen 0.2 05/23/2020 02:35 PM  
 Nitrites Positive (A) 05/23/2020 02:35 PM  
 Leukocyte Esterase LARGE (A) 05/23/2020 02:35 PM  
 Epithelial cells FEW 05/23/2020 02:35 PM  
 Bacteria 4+ (A) 05/23/2020 02:35 PM  
 WBC >100 (H) 05/23/2020 02:35 PM  
 RBC 0-5 05/23/2020 02:35 PM  
 
 
 
Medications Reviewed:  
 
Current Facility-Administered Medications Medication Dose Route Frequency  sodium chloride (NS) flush 5-40 mL  5-40 mL IntraVENous Q8H  
 sodium chloride (NS) flush 5-40 mL  5-40 mL IntraVENous PRN  
 oxyCODONE-acetaminophen (PERCOCET) 5-325 mg per tablet 2 Tab  2 Tab Oral Q4H PRN  
 naloxone (NARCAN) injection 0.4 mg  0.4 mg IntraVENous PRN  
 ondansetron (ZOFRAN) injection 4 mg  4 mg IntraVENous Q4H PRN  
 warfarin (COUMADIN) tablet 3 mg  3 mg Oral ONCE  
 insulin glargine (LANTUS) injection 10 Units  10 Units SubCUTAneous QHS  vancomycin (VANCOCIN) 1250 mg in  ml infusion  1,250 mg IntraVENous Q48H  fluconazole (DIFLUCAN) tablet 200 mg  200 mg Oral DAILY  HYDROmorphone (DILAUDID) tablet 2 mg  2 mg Oral Q4H PRN  
 bumetanide (BUMEX) tablet 2 mg  2 mg Oral DAILY  Warfarin- Pharmacy Dosing   Other Rx Dosing/Monitoring  HYDROmorphone (PF) (DILAUDID) injection 0.5 mg  0.5 mg IntraVENous Q3H PRN  
 nystatin (MYCOSTATIN) 100,000 unit/gram cream   Topical BID  sodium chloride (NS) flush 5-40 mL  5-40 mL IntraVENous Q8H  
 sodium chloride (NS) flush 5-40 mL  5-40 mL IntraVENous PRN  
 naloxone (NARCAN) injection 0.1 mg  0.1 mg IntraVENous PRN  
 diphenhydrAMINE (BENADRYL) injection 12.5 mg  12.5 mg IntraVENous Q6H PRN  
 bisacodyL (DULCOLAX) tablet 10 mg  10 mg Oral DAILY  hydrALAZINE (APRESOLINE) 20 mg/mL injection 20 mg  20 mg IntraVENous Q6H PRN  
 hydrALAZINE (APRESOLINE) tablet 100 mg  100 mg Oral TID  busPIRone (BUSPAR) tablet 10 mg  10 mg Oral BID  dilTIAZem ER (CARDIZEM CD) capsule 180 mg  180 mg Oral DAILY  sertraline (ZOLOFT) tablet 75 mg  75 mg Oral DAILY  acetaminophen (TYLENOL) tablet 650 mg  650 mg Oral Q6H PRN Or  
 acetaminophen (TYLENOL) suppository 650 mg  650 mg Rectal Q6H PRN  polyethylene glycol (MIRALAX) packet 17 g  17 g Oral DAILY PRN  promethazine (PHENERGAN) tablet 12.5 mg  12.5 mg Oral Q6H PRN Or  
 ondansetron (ZOFRAN) injection 4 mg  4 mg IntraVENous Q6H PRN  Vancomycin - pharmacy to dose   Other Rx Dosing/Monitoring  glucose chewable tablet 16 g  4 Tab Oral PRN  
 glucagon (GLUCAGEN) injection 1 mg  1 mg IntraMUSCular PRN  
 dextrose 10% infusion 0-250 mL  0-250 mL IntraVENous PRN  
 gabapentin (NEURONTIN) capsule 100 mg  100 mg Oral TID PRN  
 insulin regular (NOVOLIN R, HUMULIN R) injection   SubCUTAneous AC&HS  
 
______________________________________________________________________ EXPECTED LENGTH OF STAY: 3d 17h ACTUAL LENGTH OF STAY:          Toi Campbell MD

## 2020-08-31 NOTE — PROGRESS NOTES
8/31/2020 -  
TEE: 
- RUR: 27% - Disposition is TBD: possible placement at [facility] - Patient will need BLS transport - ABX continue 
- Drains continue 
 
09:25 -  
CM contacted ARLETTE Annia Mejiamin: [phone]), who indicated that the patient's IPR referral was not received 8/28. CM verified [employer] and submitted referral to [facility] for IPR eval.  Facility to review patient today, 8/31. CRM: Marge Monterroso, MPH, CHES; Z: [phone] 
 
16:30 -  
CM received return call from [facility] indicating that the patient's ins is out of network for [facility]. CM discussed the above with patient, who is in agreement to referral being sent to [facility]. Referral submitted via [employer]. Verbal FOC is on hard chart The Plan for Transition of Care is related to the following treatment goals: IPR The Patient was provided with a choice of provider and agrees  
with the discharge plan. [x] Yes [] No 
 
Freedom of choice list was provided with basic dialogue that supports the patient's individualized plan of care/goals, treatment preferences and shares the quality data associated with the providers. [x] Yes [] No 
CRM: Marge Monterroso, MPH, CHES; Z: [phone]

## 2020-08-31 NOTE — PROGRESS NOTES
Spiritual Care Assessment/Progress Note [address] 
 
 
NAME: Yajaira Swanson      MRN: [id number] AGE: 60 y.o. SEX: female Caodaism Affiliation: [facility]  
Language: Lakeshia Pinto 8/31/2020     Total Time (in minutes): 9 Spiritual Assessment begun in [facility] 3N TELEMETRY through conversation with: 
  
    [x]Patient        [] Family    [] Friend(s) Reason for Consult: Initial/Spiritual assessment, patient floor Spiritual beliefs: (Please include comment if needed) 
   [] Identifies with a adriane tradition:     
   [] Supported by a adriane community:        
   [] Claims no spiritual orientation:       
   [] Seeking spiritual identity:            
   [] Adheres to an individual form of spirituality:       
   [x] Not able to assess:                   
 
    
Identified resources for coping:  
   [] Prayer                           
   [] Music                  [] Guided Imagery 
   [] Family/friends                 [] Pet visits [] Devotional reading                         [x] Unknown 
   [] Other:                                          
 
 
Interventions offered during this visit: (See comments for more details) Patient Interventions: Affirmation of emotions/emotional suffering, Normalization of emotional/spiritual concerns Plan of Care: 
 
 [x] Support spiritual and/or cultural needs  
 [] Support AMD and/or advance care planning process    
 [] Support grieving process 
 [] Coordinate Rites and/or Rituals  
 [] Coordination with community clergy [] No spiritual needs identified at this time 
 [] Detailed Plan of Care below (See Comments)  [] Make referral to Music Therapy 
[] Make referral to Pet Therapy    
[] Make referral to Addiction services 
[] Make referral to [facility] 
[] Make referral to Spiritual Care Partner 
[] No future visits requested       
[x] Follow up visits as needed Comments:  for initial visit.   checked in and staff was with pt.  was able to ask if pt would like a visit she shared right now was on the best time. Let her know to have 22014 Ras karen contacted for further support would like a visit at another time.  follow up as needed. [address] M.Div, MACE 
 [phone])

## 2020-08-31 NOTE — PROGRESS NOTES
Problem: Self Care Deficits Care Plan (Adult) Goal: *Acute Goals and Plan of Care (Insert Text) Description:  
FUNCTIONAL STATUS PRIOR TO ADMISSION: Patient was MOD I for ADLs/iADLs. With L BKA. Using prosthetic for ADL related mobility with walker PTA. Reports she had been home about a month after rehab stay at [address] [place] [address] prior to this admission. HOME SUPPORT: The patient lived with  but did not require assistance for basic ADLs Occupational Therapy Goals Initiated 8/25/2020 1. Patient will perform grooming seated EOB with moderate assistance  within 7 day(s). 2.  Patient will perform upper body dressing with minimal assistance/contact guard assist within 7 day(s). 3.  Patient will perform lower body dressing with moderate assistance  within 7 day(s). 4.  Patient will perform toilet transfers with moderate assistance  within 7 day(s). 5.  Patient will perform all aspects of toileting with moderate assistance  within 7 day(s). 6.  Patient will participate in upper extremity therapeutic exercise/activities with moderate assistance as tolerated for 15 minutes within 7 day(s). 7.  Patient will utilize energy conservation techniques during functional activities with verbal cues within 7 day(s). Outcome: Progressing Towards Goal 
 OCCUPATIONAL THERAPY TREATMENT Patient: Thi Virgen (51 y.o. female) Date: 8/31/2020 Diagnosis: Sepsis ([address] [A41.9] Sepsis ([address] Procedure(s) (LRB): 
RIGHT CHEST WOUND RE EXPLORATION, DELAYED CLOSURE (URGENT) (Right) 7 Days Post-Op Precautions: Fall, Contact(per thoracic, no restrictions aside from pain with RUE) Chart, occupational therapy assessment, plan of care, and goals were reviewed. ASSESSMENT Patient continues with skilled OT services and is progressing towards goals.   Patient cooperative with therapy despite \"10/10 R knee pain\" which she was able to tolerate AROM, PROM and functional mobility up to seated edge of bed for seated ADLs and AROM R elbow, wrist and hand. Current Level of Function Impacting Discharge (ADLs): Set up UE ADLs except for shirt and bathing mod/max A; Max A-D LE ADLs, limited by R UE and LE pain at shoulder and knee. (bumped over weekend-new pain per patient) mod A-D functional mobility, a decline today related to new Knee pain, more tolerant of R UE AROM and AAROM Other factors to consider for discharge: suportive spouse in the home PLAN : 
Patient continues to benefit from skilled intervention to address the above impairments. Continue treatment per established plan of care. to address goals. Recommend with staff: out of bed at least 1 meal/day to chair Recommend next OT session: AROM, AAROM, PROM to tolerance R UE 
 
Recommendation for discharge: (in order for the patient to meet his/her long term goals) Therapy 3 hours per day 5-7 days per week This discharge recommendation: 
Has been made in collaboration with the attending provider and/or case management IF patient discharges home will need the following DME: AE: long handled dressing, bedside commode, hospital bed, transfer bench, and wheelchair SUBJECTIVE:  
Patient stated My knee got bumped over the weekend; it hurts 10/10 now.  OBJECTIVE DATA SUMMARY:  
Cognitive/Behavioral Status: 
Neurologic State: Alert Orientation Level: Oriented X4 Cognition: Follows commands; Appropriate safety awareness; Appropriate for age attention/concentration; Appropriate decision making Perception: Appears intact Perseveration: No perseveration noted Safety/Judgement: Awareness of environment; Fall prevention;Decreased insight into deficits;Home safety; Insight into deficits(RE SLING \"THEY TOLD ME NOT TO USE MY ARM\") willing to work through fear of moving R UE- accepting of correction \"No pushing or pulling but AROM OK. \"  
 
Functional Mobility and Transfers for ADLs: 
Bed Mobility: 
Rolling: Moderate assistance Supine to Sit: Moderate assistance; Additional time; Adaptive equipment;Bed Modified Sit to Supine: Minimum assistance; Additional time; Adaptive equipment Scooting: Minimum assistance; Additional time; Adaptive equipment Transfers: 
  
Functional Transfers Toilet Transfer : Total assistance( declined; inferred; limited today by R knee pain 10/10) Bed to Chair: (not able to tolerate transfer to chair today) Balance: 
Sitting: Impaired Sitting - Static: Good (unsupported) Sitting - Dynamic: Fair (occasional) Standing: Impaired(unable to try today due to R knee P! at patella) ADL Intervention: 
Feeding Feeding Assistance: Modified independent Grooming Grooming Assistance: Set-up Position Performed: Seated edge of bed Upper Body Bathing Bathing Assistance: Minimum assistance; Moderate assistance Position Performed: Seated edge of bed(Limited by AROM R proximal UE) Lower Body Bathing Bathing Assistance: Maximum assistance; Total assistance(dependent)(inferred; R LE knee pain 8/10; R UE 5/10) Position Performed: (setaed EOB; unable to stand today 2* R knee Pain) Upper Body Dressing Assistance Dressing Assistance: Maximum assistance(seated edge of bed; limited R UE proximal P!) Hospital Gown: Maximum assistance Lower Body Dressing Assistance Dressing Assistance: Total assistance(dependent)(inferred based on R UE/R LE P!, willing to try; AE may help) Toileting Toileting Assistance: Total assistance(dependent)(limited by pain and decreased transfer tolerance) Bladder Hygiene: Total assistance (dependent) Bowel Hygiene: Total assistance (dependent) Clothing Management: Total assistance (dependent) Cognitive Retraining Attention to Task: Single task Maintains Attention For (Time): Greater than 10 minutes Following Commands: Follows one step commands/directions; Follows two step commands/directions Safety/Judgement: Awareness of environment; Fall prevention;Decreased insight into deficits;Home safety; Insight into deficits(RE SLING \"THEY TOLD ME NOT TO USE MY ARM\") Therapeutic Exercises:  
Sling removed with max A, tolerated R elbow flexion and extension as well as bathing in elbow fold; trained need for [facility] AROM as tolerated/need for increased AROM trained; R shoulder shrug x 2~ 20 degrees Pain: 
10/10 R knee on approach; 8/10 at end of session post AROM; 5/10 R shoulder Activity Tolerance:  
Fair and requires rest breaks Please refer to the flowsheet for vital signs taken during this treatment. After treatment patient left in no apparent distress:  
Supine in bed, Call bell within reach, and Side rails x 3 
 
COMMUNICATION/COLLABORATION:  
The patients plan of care was discussed with: Physical therapist and Registered nurse. Liban Lua OTR/L Time Calculation: 34 mins

## 2020-08-31 NOTE — PROGRESS NOTES
Infectious Disease Progress Note Subjective: Ms Madelyn Lerma seen earlier today Has some chest wall pain Tolerating antibiotics so far without diarrhea /gi upset ROS: 10 point ROS obtained and pertinent positives include above. All others negative. Objective: 
 
Vitals:  
Patient Vitals for the past 24 hrs: 
 Temp Pulse Resp BP SpO2  
08/31/20 1138 97.5 °F (36.4 °C) 95 16 163/66 96 % 08/31/20 1011  (!) 106     
08/31/20 0906 97.8 °F (36.6 °C) (!) 104 16 134/67 97 % 08/31/20 0326 97.8 °F (36.6 °C) 86 16 163/76 98 % 08/30/20 2324 97.9 °F (36.6 °C) 85 16 168/70 97 % 08/30/20 2036 98.3 °F (36.8 °C) 89 16 151/73 97 % 08/30/20 1559 98.3 °F (36.8 °C) 81 20 162/71 98 % Physical Exam: 
Gen: No apparent distress HEENT:   no scleral icterus, no thrush, CV: S1,2 heard regularly, no lower extremity edema  , + dressing chest , drains noted Lungs: Clear to auscultation bilaterally, Abdomen: soft, non tender,  
Skin: no rash , RLE chronic venous changes to skin Musculoskeletal:  No joint edema, erythema or tenderness noted RLE, + LLE BKA Medications: 
 
Current Facility-Administered Medications:  
  warfarin (COUMADIN) tablet 6 mg, 6 mg, Oral, ONCE, Alisson Zhu MD 
  Vancomycin trough- please draw at 2100 on 8/31. Thanks!, , Other, ONCE, MD Melisa Serra Pes ON 9/1/2020] bumetanide (BUMEX) tablet 0.5 mg, 0.5 mg, Oral, BID, Alisson Zhu MD 
  HYDROmorphone (DILAUDID) tablet 3 mg, 3 mg, Oral, Q4H PRN, Alisson Zhu MD 
  sodium chloride (NS) flush 5-40 mL, 5-40 mL, IntraVENous, Q8H, Ramandeep Partida MD, 10 mL at 08/31/20 8401   sodium chloride (NS) flush 5-40 mL, 5-40 mL, IntraVENous, PRN, Ramandeep Partida MD 
  naloxone [facility]) injection 0.4 mg, 0.4 mg, IntraVENous, PRN, Ramandeep Partida MD 
  ondansetron [facility]) injection 4 mg, 4 mg, IntraVENous, Q4H PRN, Ramandeep Partida MD 
   insulin glargine (LANTUS) injection 10 Units, 10 Units, SubCUTAneous, QHS, Kayla Metz MD, 10 Units at 08/30/20 2234   [Held by provider] vancomycin (VANCOCIN) 1250 mg in  ml infusion, 1,250 mg, IntraVENous, Q48H, Kayla Metz MD, Last Rate: 125 mL/hr at 08/29/20 2011, 1,250 mg at 08/29/20 2011   Warfarin- Pharmacy Dosing, , Other, Rx Dosing/Monitoring, Kayla Metz MD 
  HYDROmorphone (PF) (DILAUDID) injection 0.5 mg, 0.5 mg, IntraVENous, Q3H PRN, Kayla Metz MD, 0.5 mg at 08/31/20 1250   nystatin (MYCOSTATIN) 100,000 unit/gram cream, , Topical, BID, Elias Baumgarten, Endalkachew G, MD 
  sodium chloride (NS) flush 5-40 mL, 5-40 mL, IntraVENous, Q8H, Zaira Layton PA, 10 mL at 08/31/20 0350   sodium chloride (NS) flush 5-40 mL, 5-40 mL, IntraVENous, PRN, LORNA Martinez, 10 mL at 08/27/20 6631   naloxone (NARCAN) injection 0.1 mg, 0.1 mg, IntraVENous, PRN, LORNA Martinez 
  diphenhydrAMINE (BENADRYL) injection 12.5 mg, 12.5 mg, IntraVENous, Q6H PRN, LORNA Martinez 
  bisacodyL (DULCOLAX) tablet 10 mg, 10 mg, Oral, DAILY, LORNA Martinez, 10 mg at 08/31/20 7797   hydrALAZINE (APRESOLINE) 20 mg/mL injection 20 mg, 20 mg, IntraVENous, Q6H PRN, Angela Monroy MD, 20 mg at 08/28/20 1674   hydrALAZINE (APRESOLINE) tablet 100 mg, 100 mg, Oral, TID, Angela Monroy MD, 100 mg at 08/31/20 0845   busPIRone (BUSPAR) tablet 10 mg, 10 mg, Oral, BID, Rick Bueno MD, 10 mg at 08/31/20 8259   dilTIAZem ER (CARDIZEM CD) capsule 180 mg, 180 mg, Oral, DAILY, Rick Bueno MD, 180 mg at 08/31/20 7928   sertraline (ZOLOFT) tablet 75 mg, 75 mg, Oral, DAILY, Rick Bueno MD, 75 mg at 08/31/20 0526   acetaminophen (TYLENOL) tablet 650 mg, 650 mg, Oral, Q6H PRN, 650 mg at 08/21/20 1602 **OR** acetaminophen (TYLENOL) suppository 650 mg, 650 mg, Rectal, Q6H PRN, Rick Bueno MD 
  polyethylene glycol (MIRALAX) packet 17 g, 17 g, Oral, DAILY PRN, Delano Woods MD 
  promethazine (PHENERGAN) tablet 12.5 mg, 12.5 mg, Oral, Q6H PRN **OR** ondansetron (ZOFRAN) injection 4 mg, 4 mg, IntraVENous, Q6H PRN, Delano Woods MD 
  Vancomycin - pharmacy to dose, , Other, Rx Dosing/Monitoring, Luisana Daugherty Mac Screen M, DO 
  glucose chewable tablet 16 g, 4 Tab, Oral, PRN, Luisana Daugherty Mac Screen M, DO 
  glucagon (GLUCAGEN) injection 1 mg, 1 mg, IntraMUSCular, PRN, Tillman Peabody M, DO 
  dextrose 10% infusion 0-250 mL, 0-250 mL, IntraVENous, PRN, Luisana Daugherty Mac Hailey M, DO 
  gabapentin (NEURONTIN) capsule 100 mg, 100 mg, Oral, TID PRN, Tillman Peabody M, DO 
  insulin regular (NOVOLIN R, HUMULIN R) injection, , SubCUTAneous, AC&HS, Tillman Peabody M, DO, Stopped at 08/29/20 1630 Labs: 
Recent Results (from the past 24 hour(s)) GLUCOSE, POC Collection Time: 08/30/20  4:49 PM  
Result Value Ref Range Glucose (POC) 152 (H) 65 - 100 mg/dL Performed by Velvet Wyatt GLUCOSE, POC Collection Time: 08/30/20 10:02 PM  
Result Value Ref Range Glucose (POC) 114 (H) 65 - 100 mg/dL Performed by Naomi Scherer PROTHROMBIN TIME + INR Collection Time: 08/31/20  3:25 AM  
Result Value Ref Range INR 1.1 0.9 - 1.1 Prothrombin time 11.7 (H) 9.0 - 11.1 sec METABOLIC PANEL, BASIC Collection Time: 08/31/20  3:25 AM  
Result Value Ref Range Sodium 138 136 - 145 mmol/L Potassium 4.0 3.5 - 5.1 mmol/L Chloride 106 97 - 108 mmol/L  
 CO2 25 21 - 32 mmol/L Anion gap 7 5 - 15 mmol/L Glucose 99 65 - 100 mg/dL BUN 20 6 - 20 MG/DL Creatinine 1.65 (H) 0.55 - 1.02 MG/DL  
 BUN/Creatinine ratio 12 12 - 20 GFR est AA 38 (L) >60 ml/min/1.73m2 GFR est non-AA 32 (L) >60 ml/min/1.73m2 Calcium 9.0 8.5 - 10.1 MG/DL  
GLUCOSE, POC Collection Time: 08/31/20  7:50 AM  
Result Value Ref Range Glucose (POC) 94 65 - 100 mg/dL Performed by Shanon Hansen GLUCOSE, POC  Collection Time: 08/31/20 11:51 AM  
 Result Value Ref Range Glucose (POC) 114 (H) 65 - 100 mg/dL Performed by Zaira Hansen Micro:  
 
Wound 8/25/20 Specimen Information:  Tissue STERNUM   
     
Component  Value  Ref Range & Units  Status Special Requests:  MANUBRIUM   Final   
GRAM STAIN  RARE WBCS SEEN     Final   
GRAM STAIN  NO ORGANISMS SEEN     Final   
Culture result: Abnormal        Final   
LIGHT STAPHYLOCOCCUS AUREUS Susceptibility Staphylococcus aureus BRITTON Ciprofloxacin ($)  <=0.5 ug/mL  S Clindamycin ($)  0.25 ug/mL  S Daptomycin ($$$$$)  0.25 ug/mL  S Doxycycline ($$)  <=0.5 ug/mL  S Erythromycin ($$$$)  <=0.25 ug/mL  S Gentamicin ($)  <=0.5 ug/mL  S Levofloxacin ($)  <=0.12 ug/mL  S Linezolid ($$$$$)  2 ug/mL  S Moxifloxacin ($$$$)  <=0.25 ug/mL  S Oxacillin  <=0.25 ug/mL  S Rifampin ($$$$)  <=0.5 ug/mL  S1 Tetracycline  <=1 ug/mL  S Trimeth/Sulfa  <=10 ug/mL  S Vancomycin ($)  1 ug/mL  S Blood: 8/20/20 Specimen Information:  Blood   
     
Component  Value  Ref Range & Units  Status Special Requests:  NO SPECIAL REQUESTS     Preliminary Culture result:  NO GROWTH 4 DAYS Wound Sternoclavicular joint Tissue RIGHT STERNOCLAVICULAR JOINT A   
     
Component  Value  Ref Range & Units  Status Special Requests:  NO SPECIAL REQUESTS     Final   
GRAM STAIN  NO WBC'S SEEN     Final   
GRAM STAIN  NO ORGANISMS SEEN     Final   
Culture result: Abnormal        Final   
HEAVY STAPHYLOCOCCUS AUREUS Susceptibility Staphylococcus aureus BRITTON Ciprofloxacin ($)  <=0.5 ug/mL  S Clindamycin ($)  0.25 ug/mL  S Daptomycin ($$$$$)  0.25 ug/mL  S Doxycycline ($$)  <=0.5 ug/mL  S Erythromycin ($$$$)  <=0.25 ug/mL  S Gentamicin ($)  <=0.5 ug/mL  S Levofloxacin ($)  <=0.12 ug/mL  S Linezolid ($$$$$)  2 ug/mL  S Moxifloxacin ($$$$)  <=0.25 ug/mL  S    
 Oxacillin  <=0.25 ug/mL  S Rifampin ($$$$)  <=0.5 ug/mL  S1 Tetracycline  <=1 ug/mL  S Trimeth/Sulfa  <=10 ug/mL  S Vancomycin ($)  1 ug/mL  S Component  Value  Ref Range & Units  Status Special Requests:    Final   
RIGHT STERNOCLAVICULAR JOINT ABSCESS Culture result:  NO ANAEROBES ISOLATED     Final   
Result History Tissue RIGHT STERNOCLAVICULAR JOINT A   
     
Component  Value  Ref Range & Units  Status Special Requests:  NO SPECIAL REQUESTS     Final   
GRAM STAIN  NO WBC'S SEEN     Final   
GRAM STAIN  NO ORGANISMS SEEN     Final   
Culture result: Abnormal        Final   
HEAVY STAPHYLOCOCCUS AUREUS Susceptibility Staphylococcus aureus BRITTON Ciprofloxacin ($)  <=0.5 ug/mL  S Clindamycin ($)  0.25 ug/mL  S Daptomycin ($$$$$)  0.25 ug/mL  S Doxycycline ($$)  <=0.5 ug/mL  S Erythromycin ($$$$)  <=0.25 ug/mL  S Gentamicin ($)  <=0.5 ug/mL  S Levofloxacin ($)  <=0.12 ug/mL  S Linezolid ($$$$$)  2 ug/mL  S Moxifloxacin ($$$$)  <=0.25 ug/mL  S Oxacillin  <=0.25 ug/mL  S Rifampin ($$$$)  <=0.5 ug/mL  S1 Tetracycline  <=1 ug/mL  S Trimeth/Sulfa  <=10 ug/mL  S Vancomycin ($)  1 ug/mL  S Component  Value  Ref Range & Units  Status Special Requests:    Final   
RIGHT STERNOCLAVICULAR JOINT ABSCESS Culture result:  NO ANAEROBES ISOLATED     Final   
Result History CULTURE, ANAEROBIC on 8/22/2020 Imaging: 
CT chest 8/19/20 
  
FINDINGS: 
  
CHEST WALL: No mass or axillary lymphadenopathy. THYROID: No nodule. MEDIASTINUM: No mass or lymphadenopathy. ASHLYN: No mass or lymphadenopathy. THORACIC AORTA: No aneurysm. MAIN PULMONARY ARTERY: Normal in caliber. TRACHEA/BRONCHI: Patent. ESOPHAGUS: No wall thickening or dilatation. HEART: Coronary artery calcifications are present. PLEURA: No effusion or pneumothorax. LUNGS: No nodule, mass, or airspace disease. INCIDENTALLY IMAGED UPPER ABDOMEN: There is a right adrenal adenoma measuring 2.8 cm. BONES: There is inflammation involving the right sternoclavicular joint. There 
is no focal fluid collection. 
  
IMPRESSION IMPRESSION: 
Suspect septic arthritis of the right sternoclavicular joint. No drainable fluid Collection. TTE 8/20/2020 · LV: Estimated LVEF is 55 - 60%. Visually measured ejection fraction. Normal cavity size and systolic function (ejection fraction normal). Mild concentric hypertrophy. Wall motion: normal. Normal left ventricular strain. · LA: Moderately dilated left atrium. · AV: Aortic valve leaflet calcification present without reduced excursion. · MV: Mitral valve non-specific thickening. Echo Findings Left Ventricle  Normal cavity size and systolic function (ejection fraction normal). Mild concentric hypertrophy. Wall motion: normal. The estimated EF is 55 - 60%. Visually measured ejection fraction. Normal left ventricular strain. Left Atrium  Moderately dilated left atrium. Right Ventricle  Normal cavity size and global systolic function. Right Atrium  Normal cavity size. Aortic Valve  No stenosis and no regurgitation. There is leaflet calcification without reduced excursion. Mitral Valve  No stenosis. Mitral valve non-specific thickening. Trace regurgitation. Tricuspid Valve  Normal valve structure, no stenosis and no regurgitation. Pulmonic Valve  Pulmonic valve not well visualized. Aorta  Normal aortic root. Pericardium  No evidence of pericardial effusion. Assessment / Plan Ms. Karan Reyes is a 60-year-old lady with a history of diabetes, hypertension, A. 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 She underwent incision and debridement of the right sternoclavicular joint, resection of the middle third of the right clavicle and placement of wound VAC on 8/20/2020. Operative findings include \" jacinta purulence within the [facility] joint which was eroded\" Her blood culture on 8/20/2020 was negative but wound cultures from [facility] joint debridement were positive for MSSA She is currently on IV vancomycin She reports a history of myalgias and muscle issues while on daptomycin previously She lists an allergy to antibiotics including Bactrim as well as penicillin. She had hives with penicillin. Cephalosporin was attempted and she says she passed out almost and had no idea what was happening to her when she received cefepime in the past. 
 
1) sternoclavicular joint abscess, osteomyelitis History of MSSA bacteremia, pacer vegetation status post removal and endocarditis treatment in April to May 2020 Left foot osteomyelitis status post amputation/BKA May 2020 I discussed with the patient that vancomycin is  not the best medication for MSSA. However given her issues with antibiotics in the past to cephalosporins, penicillin and daptomycin,  we will continue with IV vancomycin as choices are limited Will plan on 6 weeks of antibiotics for sternoclavicular osteomyelitis till 10/5/20 TTE noted Renally dose vancomycin by pharmacy for trough goal of 15-20 . Renal function up trended today but was also receiving diuretic which is being adjusted by primary team 
F/U wt me in 2 weeks of DC Line per primary team and removal once IV therapy completed Check weekly labs for CBC with differential, BMP and vancomycin trough please fax those results to me for review Antibiotic side effects/adverse effects/toxicities discussed including gastrointestinal, renal, hematological , ability to lower siezure threshold, risk for C diff infection. Probiotics, daily yogurt encouraged. Check weekly labs for CBC with differential, BMP, ESR, CRP and vancomycin trough Vancomycin dosing to be adjusted by pharmacy for trough goal of 15-20. Vanc random level 18 today One other option is p.o. Zyvox but patient usually do not tolerate 6 weeks of Zyvox secondary to bone marrow suppression and also has drug drug interactions . May consider later in treatment course if unable to tolerate Vancomycin IV 
 
 
 
 
2) A. fib 3) PARAG Renally dose vancomycin 4) DVT prophylaxis 5) screening hep C antibody negative in March 2020 Check HIV-patient verbally consented Thank you for the opportunity to participate in the care of this patient. Please contact with questions or concerns.    
 
Mark Anthony Teague,  
2:53 PM

## 2020-08-31 NOTE — PROGRESS NOTES
Pharmacist Note  Warfarin Dosing Consult provided for this 60 y. o.female to manage warfarin for Atrial Fibrillation INR Goal: 2 - 3 Home regimen/ tablet size: 2 mg PO daily Drugs that may increase INR: None Drugs that may decrease INR: None Other current anticoagulants/ drugs that may increase bleeding risk: Sertraline Risk factors: None Daily INR ordered: YES Recent Labs 08/31/20 
8087 08/30/20 
8813 08/29/20 
3919 INR 1.1 1.1 1.1 Date               INR                  Dose 8/27  --  3 mg  
8/28                1.1                   3 mg 
8/29                1.1                   3 mg 
8/30                1.1                   3 mg 
8/31                1.1                   6 mg Assessment/ Plan: Will order warfarin 6 mg PO x 1- will increase dose as INR has not budged since resumption of therapy. Pharmacy will continue to monitor daily and adjust therapy as indicated. Please contact the pharmacist at  for outpatient recommendations if needed.   
 
 
Kristen Antonio, PharmD, BCPS

## 2020-08-31 NOTE — PROGRESS NOTES
Problem: Risk for Spread of Infection Goal: Prevent transmission of infectious organism to others Description: Prevent the transmission of infectious organisms to other patients, staff members, and visitors. Outcome: Progressing Towards Goal 
  
Problem: Falls - Risk of 
Goal: *Absence of Falls Description: Document Devere Bajadero Fall Risk and appropriate interventions in the flowsheet. Outcome: Progressing Towards Goal 
Note: Fall Risk Interventions: 
Mobility Interventions: Patient to call before getting OOB Medication Interventions: Patient to call before getting OOB Elimination Interventions: Toileting schedule/hourly rounds Problem: Pain Goal: *Control of Pain Outcome: Progressing Towards Goal 
  
Problem: Pressure Injury - Risk of 
Goal: *Prevention of pressure injury Description: Document Valdemar Scale and appropriate interventions in the flowsheet. Outcome: Progressing Towards Goal 
Note: Pressure Injury Interventions: 
Sensory Interventions: Assess changes in LOC Moisture Interventions: Absorbent underpads Activity Interventions: Increase time out of bed, PT/OT evaluation Mobility Interventions: PT/OT evaluation Nutrition Interventions: Document food/fluid/supplement intake Friction and Shear Interventions: Foam dressings/transparent film/skin sealants, Apply protective barrier, creams and emollients, HOB 30 degrees or less, Lift sheet, Minimize layers

## 2020-08-31 NOTE — PROGRESS NOTES
[address] [facility] Adult  Hospitalist Group Hospitalist Progress Note Major Charlton MD 
Answering service: [phone] OR 36 from in house phone NAME:  Aldair Mcdonnell :  1960 MRN:  [id number] Admission Summary: A 60year old female with past medical history HTN, DM Type II on insulin, Atrial fibrillation/flutter, CKD stage III, recent admission 2020-2020 for left foot cellulitis/OM s/p left BKA, bacteremia MSSA, endocarditis, Pacemaker vegetation s/p lead extraction, presenting to [facility] as a direct transfer from [facility] emergency department for thoracic surgery evaluation.  Patient developed right-sided chest pain/tenderness radiating to right shoulder.  Seen at urgent care and found to have atrial fibrillation with RVR. Sent to the emergency department for further evaluation.  In the ED, CT chest demonstrated septic arthritis of the right sternal clavicular joint and no abscess.  Given Merrem and vancomycin and transferred to [facility] 2020 
  
Interval history / Subjective:  
 
Patient seen and examined at the bedside 
no issues with pain control- some erratic blood glucose levels Followed by ID and thoracic surgery 
-COVID 19 test negative Assessment & Plan:  
 
Sepsis secondary to septic right sternoclavicular joint and abscess- resolved  
-Recent complicated hospitalization with MSSA bacteremia/endocarditis - Incision and debridement of right sternoclavicular joint.  Resection of medial third of right clavicle.  Placement of wound VAC on  
- OR cx: MSSA 
- on iv vancomycin - pain control with dilaudid - Thoracic surgery and ID consulted and following 
  
Atrial fibrillation with RVR 
-rate controlled, off Cardizem drip, continue with oral diltiazem 
- HR improved and < 100  
restarted on warfarin- pharmacy to dose Hx of pacemaker lead vegetation and s/p removal of device 
-stable, continue cardiac monitoring 
  
PARAG on CKD stage III  
-creatinine starting to rise Decreasing bumex dose from 2mg every day to 0.5 BID 
  
T2DM- hyperglycemia 
-restart long-acting, continue SSI. A1c 6.1, monitor finger stick glucose 
  
HTN  
- BP stable on current med regimen 
  
Depression 
-stable, continue buspirone, Zoloft Vaginal yeast infection- starting diflucan Code status:Full Code DVT prophylaxis:SCD Care Plan discussed with: Patient/Family, Nurse and  Anticipated Disposition: TBD Anticipated Discharge: Greater than 48 hours Hospital Problems  Date Reviewed: 8/24/2020 Codes Class Noted POA Disorder of sternoclavicular joint ICD-10-CM: M25.9 ICD-9-CM: 719.91  8/21/2020 Unknown * (Principal) Sepsis ([facility] [address] ICD-10-CM: A41.9 ICD-9-CM: 038.9, 995.91  8/20/2020 Yes Vital Signs:  
 Last 24hrs VS reviewed since prior progress note. Most recent are: 
Visit Vitals /61 (BP 1 Location: Left arm, BP Patient Position: At rest) Pulse 82 Temp 98 °F (36.7 °C) Resp 16 Ht 5' 10\" (1.778 m) Wt 107.5 kg (236 lb 15.9 oz) SpO2 97% BMI 34.01 kg/m² Intake/Output Summary (Last 24 hours) at 8/31/2020 1916 Last data filed at 8/31/2020 1545 Gross per 24 hour Intake  Output 1800 ml Net -1800 ml Physical Examination:  
 
 
     
Constitutional:  No acute distress, cooperative, pleasant ENT:  Oral mucosa moist, oropharynx benign. Resp: 
Chest:  CTA bilaterally. No wheezing/rhonchi/rales. No accessory muscle use 
r chest incision without erythma- staples intact CV:  Regular rhythm, normal rate, no murmurs, gallops, rubs GI:  Soft, non distended, non tender. normoactive bowel sounds, no hepatosplenomegaly Musculoskeletal:  No edema, left BKA Neurologic:  Moves all extremities. AAOx3, CN II-XII reviewed Skin:  Good turgor, no rashes or ulcers Data Review:  
 Review and/or order of clinical lab test 
Review and/or order of tests in the radiology section of CPT Review and/or order of tests in the medicine section of CPT Labs:  
 
No results for input(s): WBC, HGB, HCT, PLT, HGBEXT, HCTEXT, PLTEXT, HGBEXT, HCTEXT, PLTEXT in the last 72 hours. Recent Labs 08/31/20 
6019 08/30/20 
2387  136  
K 4.0 4.1  107 CO2 25 22 BUN 20 19 CREA 1.65* 1.54* GLU 99 101* CA 9.0 8.8 No results for input(s): ALT, AP, TBIL, TBILI, TP, ALB, GLOB, GGT, AML, LPSE in the last 72 hours. No lab exists for component: SGOT, GPT, AMYP, HLPSE Recent Labs 08/31/20 
2235 08/30/20 
0256 08/29/20 
3344 INR 1.1 1.1 1.1 PTP 11.7* 11.5* 11.1 No results for input(s): FE, TIBC, PSAT, FERR in the last 72 hours. Lab Results Component Value Date/Time Folate 8.5 03/14/2020 02:49 PM  
  
No results for input(s): PH, PCO2, PO2 in the last 72 hours. No results for input(s): CPK, CKNDX, TROIQ in the last 72 hours. No lab exists for component: CPKMB Lab Results Component Value Date/Time Cholesterol, total 141 11/11/2019 08:47 AM  
 HDL Cholesterol 32 (L) 11/11/2019 08:47 AM  
 LDL, calculated 82 11/11/2019 08:47 AM  
 Triglyceride 137 11/11/2019 08:47 AM  
 
Lab Results Component Value Date/Time Glucose (POC) 135 (H) 08/31/2020 04:12 PM  
 Glucose (POC) 114 (H) 08/31/2020 11:51 AM  
 Glucose (POC) 94 08/31/2020 07:50 AM  
 Glucose (POC) 114 (H) 08/30/2020 10:02 PM  
 Glucose (POC) 152 (H) 08/30/2020 04:49 PM  
 
Lab Results Component Value Date/Time  Color YELLOW/STRAW 05/23/2020 02:35 PM  
 Appearance CLOUDY (A) 05/23/2020 02:35 PM  
 Specific gravity 1.009 05/23/2020 02:35 PM  
 pH (UA) 6.0 05/23/2020 02:35 PM  
 Protein Negative 05/23/2020 02:35 PM  
 Glucose Negative 05/23/2020 02:35 PM  
 Ketone Negative 05/23/2020 02:35 PM  
 Bilirubin Negative 05/23/2020 02:35 PM  
 Urobilinogen 0.2 05/23/2020 02:35 PM  
 Nitrites Positive (A) 05/23/2020 02:35 PM  
 Leukocyte Esterase LARGE (A) 05/23/2020 02:35 PM  
 Epithelial cells FEW 05/23/2020 02:35 PM  
 Bacteria 4+ (A) 05/23/2020 02:35 PM  
 WBC >100 (H) 05/23/2020 02:35 PM  
 RBC 0-5 05/23/2020 02:35 PM  
 
 
 
Medications Reviewed:  
 
Current Facility-Administered Medications Medication Dose Route Frequency  Vancomycin trough- please draw at 2100 on 8/31. Thanks! Other ONCE  
 [START ON 9/1/2020] bumetanide (BUMEX) tablet 0.5 mg  0.5 mg Oral BID  
 HYDROmorphone (DILAUDID) tablet 3 mg  3 mg Oral Q4H PRN  
 sodium chloride (NS) flush 5-40 mL  5-40 mL IntraVENous Q8H  
 sodium chloride (NS) flush 5-40 mL  5-40 mL IntraVENous PRN  
 naloxone (NARCAN) injection 0.4 mg  0.4 mg IntraVENous PRN  
 ondansetron (ZOFRAN) injection 4 mg  4 mg IntraVENous Q4H PRN  
 insulin glargine (LANTUS) injection 10 Units  10 Units SubCUTAneous QHS  [Held by provider] vancomycin (VANCOCIN) 1250 mg in  ml infusion  1,250 mg IntraVENous Q48H  Warfarin- Pharmacy Dosing   Other Rx Dosing/Monitoring  HYDROmorphone (PF) (DILAUDID) injection 0.5 mg  0.5 mg IntraVENous Q3H PRN  
 nystatin (MYCOSTATIN) 100,000 unit/gram cream   Topical BID  sodium chloride (NS) flush 5-40 mL  5-40 mL IntraVENous Q8H  
 sodium chloride (NS) flush 5-40 mL  5-40 mL IntraVENous PRN  
 naloxone (NARCAN) injection 0.1 mg  0.1 mg IntraVENous PRN  
 diphenhydrAMINE (BENADRYL) injection 12.5 mg  12.5 mg IntraVENous Q6H PRN  
 bisacodyL (DULCOLAX) tablet 10 mg  10 mg Oral DAILY  hydrALAZINE (APRESOLINE) 20 mg/mL injection 20 mg  20 mg IntraVENous Q6H PRN  
 hydrALAZINE (APRESOLINE) tablet 100 mg  100 mg Oral TID  busPIRone (BUSPAR) tablet 10 mg  10 mg Oral BID  dilTIAZem ER (CARDIZEM CD) capsule 180 mg  180 mg Oral DAILY  sertraline (ZOLOFT) tablet 75 mg  75 mg Oral DAILY  acetaminophen (TYLENOL) tablet 650 mg  650 mg Oral Q6H PRN  Or  
  acetaminophen (TYLENOL) suppository 650 mg  650 mg Rectal Q6H PRN  polyethylene glycol (MIRALAX) packet 17 g  17 g Oral DAILY PRN  promethazine (PHENERGAN) tablet 12.5 mg  12.5 mg Oral Q6H PRN Or  
 ondansetron (ZOFRAN) injection 4 mg  4 mg IntraVENous Q6H PRN  Vancomycin - pharmacy to dose   Other Rx Dosing/Monitoring  glucose chewable tablet 16 g  4 Tab Oral PRN  
 glucagon (GLUCAGEN) injection 1 mg  1 mg IntraMUSCular PRN  
 dextrose 10% infusion 0-250 mL  0-250 mL IntraVENous PRN  
 gabapentin (NEURONTIN) capsule 100 mg  100 mg Oral TID PRN  
 insulin regular (NOVOLIN R, HUMULIN R) injection   SubCUTAneous AC&HS  
 
______________________________________________________________________ EXPECTED LENGTH OF STAY: 3d 17h ACTUAL LENGTH OF STAY:          Elidu Maher MD

## 2020-08-31 NOTE — PROGRESS NOTES
Problem: Mobility Impaired (Adult and Pediatric) Goal: *Acute Goals and Plan of Care (Insert Text) Description: FUNCTIONAL STATUS PRIOR TO ADMISSION: Patient was modified independent using a rolling walker and and L BKA prosthesis for functional mobility. HOME SUPPORT PRIOR TO ADMISSION: The patient lived with family but did not require assist. 
 
Physical Therapy Goals Initiated 8/23/2020 1. Patient will move from supine to sit and sit to supine , scoot up and down, and roll side to side in bed with minimal assistance/contact guard assist within 7 day(s). 2.  Patient will transfer from bed to chair and chair to bed with minimal assistance/contact guard assist using the least restrictive device within 7 day(s). 3.  Patient will perform sit to stand with minimal assistance/contact guard assist within 7 day(s). 4.  Patient will ambulate with minimal assistance/contact guard assist for 10 feet with the least restrictive device within 7 day(s). Outcome: Progressing Towards Goal 
PHYSICAL THERAPY TREATMENT Patient: Aldair Mcdonnell (80 y.o. female) Date: 8/31/2020 Diagnosis: Sepsis ([facility] [address] [A41.9] Sepsis ([address] Procedure(s) (LRB): 
RIGHT CHEST WOUND RE EXPLORATION, DELAYED CLOSURE (URGENT) (Right) 7 Days Post-Op Precautions: Fall, Contact(per thoracic, no restrictions aside from pain with RUE) Chart, physical therapy assessment, plan of care and goals were reviewed. ASSESSMENT Patient continues with skilled PT services and is progressing towards goals. Pt required max encouragement to participate in session but was ultimately agreeable. She reports R shoulder pain, R knee pain limiting activity tolerance this date - R knee painful to patella and medial aspect of knee along joint line. Pt transferred supine<>sit with min-modA and tolerated sitting EOB for ~10 minutes. Continuing to recommend IPR upon discharge to maximize safety and independence with functional mobility. Current Level of Function Impacting Discharge (mobility/balance): modA for bed mobility Other factors to consider for discharge: R shoulder NWB, L BKA PLAN : 
Patient continues to benefit from skilled intervention to address the above impairments. Continue treatment per established plan of care. to address goals. Recommendation for discharge: (in order for the patient to meet his/her long term goals) Therapy 3 hours per day 5-7 days per week This discharge recommendation: 
Has not yet been discussed the attending provider and/or case management IF patient discharges home will need the following DME: to be determined (TBD) SUBJECTIVE:  
Patient stated They bumped my knee against the doorframe this weekend.  OBJECTIVE DATA SUMMARY:  
Critical Behavior: 
Neurologic State: Alert Orientation Level: Oriented X4 Cognition: Follows commands, Appropriate safety awareness, Appropriate for age attention/concentration, Appropriate decision making Safety/Judgement: Awareness of environment, Fall prevention, Decreased insight into deficits, Home safety, Insight into deficits(RE SLING \"THEY TOLD ME NOT TO USE MY ARM\") Functional Mobility Training: 
Bed Mobility: 
Rolling: Moderate assistance Supine to Sit: Moderate assistance; Additional time; Adaptive equipment;Bed Modified Sit to Supine: Minimum assistance; Additional time; Adaptive equipment Scooting: Minimum assistance; Additional time; Adaptive equipment Transfers: 
Bed to Chair: (not able to tolerate transfer to chair today) Balance: 
Sitting: Impaired Sitting - Static: Good (unsupported) Sitting - Dynamic: Fair (occasional) Standing: Impaired(unable to try today due to R knee P! at patella) Activity Tolerance:  
Fair and Poor, limited by pain Please refer to the flowsheet for vital signs taken during this treatment. After treatment patient left in no apparent distress:  
Supine in bed and Call bell within reach COMMUNICATION/COLLABORATION:  
The patients plan of care was discussed with: Occupational therapist and Registered nurse. Leah Pae, PT, DPT Time Calculation: 26 mins

## 2020-08-31 NOTE — WOUND CARE
Wound Care Note:  
 
New consult placed by physician request for negative pressure wound therapy and nurse request for right foot swelling and abrasion Chart shows: 
Admitted for sepsis and disorder of sternoclavicular joint s/p incision and debridement of right sternoclavicular joint, resection of medial third of right clavicle and placement of wound VAC 8/20/20  S/p debridement with resection of the manubrium including bone and muscle using electrocautery and bon rongeur and right pectoralis advancement flap 8/24/20 Past Medical History:  
Diagnosis Date  Acquired lymphedema Right arm  Arrhythmia  Asthma  Atrial fibrillation ([address] Dr. Scott Bowling and Dr. Skinner [facility])  Cancer ([address] bilateral breast  
 Chronic kidney disease  Diabetes mellitus type II   
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  
Patient is A&O x 4, communicative, continent with no assistance needed in repositioning. Bed: [facility] Patient wearing briefs Diet: Diabetic consistent carb regular Patient pre-medicated for pain by RN. Right heel skin intact without erythema; Left BKA. Buttocks and sacral were not assessed, patient refused stated she has no skin breakdown.   Educated her on changing position frequently to prevent pressure injury; she acknowledges understanding and states she does. 1. POA  Right lower leg with two crusted abrasions, no drainage, no erythema, left open to air, fredy-wound with dry flaky skin, Nourishing Skin Cream applied. 2.  Right upper chest with incision, staples in place, no drainage, no erythema. Spoke with Dr. Juana Hanna, no wound VAC needed. Spoke with Dr. Angela Stearns, skin care orders obtained. Patient supine. Heel offloaded on pillow but patient asked for it to be removed; pillow removed and nurse notified. Recommendations:   
Right lower leg- Every 12 hours apply Nourishing Skin Cream (purple tube) Skin Care & Pressure Prevention: 
Minimize layers of linen/pads under patient to optimize support surface. Turn/reposition approximately every 2 hours and offload heels. Manage incontinence / promote continence Nourishing Skin Cream to dry skin, minimize use of briefs when able Discussed above plan with patient & GURINDER Larios Transition of Care: Wound care will sign off. DARREN Boyd, RN, [facility], [facility]. 
office [phone] 
pager [phone] or call  to page

## 2020-09-01 NOTE — PROGRESS NOTES
Pharmacist Note  Warfarin Dosing Consult provided for this 60 y. o.female to manage warfarin for Atrial Fibrillation INR Goal: 2 - 3 Home regimen/ tablet size: 2 mg PO daily Drugs that may increase INR: None Drugs that may decrease INR: None Other current anticoagulants/ drugs that may increase bleeding risk: Sertraline Risk factors: None Daily INR ordered: YES Recent Labs  
  09/01/20 
0430 08/31/20 
0325 08/30/20 
3319 INR 1.3* 1.1 1.1 Date               INR                  Dose 8/27  --  3 mg  
8/28                1.1                   3 mg 
8/29                1.1                   3 mg 
8/30                1.1                   3 mg 
8/31                1.1                   6 mg 
9/1                  1.3                   5 mg Assessment/ Plan: Will order warfarin 5 mg PO x 1 to help boost INR. Pharmacy will continue to monitor daily and adjust therapy as indicated. Please contact the pharmacist at  for outpatient recommendations if needed.   
 
 
Rikki Montenegro, DeniaD, BCPS

## 2020-09-01 NOTE — PROGRESS NOTES
Thoracic Surgery Simple Progress Note Admit Date: 2020 POD: 8 Days Post-Op Procedure:  Procedure(s): RIGHT CHEST WOUND RE EXPLORATION, DELAYED CLOSURE (URGENT) Subjective:  
 
Patient has complaints: No significant medical complaints Review of Systems: 
CARDIAC: positive for paroxysmal Afib, s/p pacer removal for lead infection/vegetation RESP: +R sternoclavicular joint infection NEURO:  negative INCISION: Clean, dry, and intact EXT: Denies new swelling or pain in the legs or calves 
  
 
Objective:  
 
Blood pressure 158/73, pulse 87, temperature 98.5 °F (36.9 °C), resp. rate 18, height 5' 10\" (1.778 m), weight 230 lb 13.2 oz (104.7 kg), SpO2 97 %. Temp (24hrs), Av.1 °F (36.7 °C), Min:97.5 °F (36.4 °C), Max:98.5 °F (36.9 °C) Hemodynamics PAP 
  CO 
  CI 
 
701 - 1900 In: -  
Out: 318 [EIZJF:470] 1901 -  07 In: 36 [P.O.:480; I.V.:250] Out: 5941 [Urine:1800; Drains:10] EXAM: 
GENERAL: VSS, afrible, alert and cooperative HEART:  regular rate and rhythm LUNG: clear to auscultation bilaterally NEURO:  normal without focal findings 
mental status, speech normal, alert and oriented x iii INCISION: Clean, dry, and intact staples and sutures, JPs in place minimal output, Wound is healing well, very little swelling EXTREMITIES:No evidence of DVT seen on physical exam.Lt BKA GI/: Abd soft, nonterder with + bowel sounds. Voiding Labs: 
Recent Results (from the past 24 hour(s)) GLUCOSE, POC Collection Time: 20 11:51 AM  
Result Value Ref Range Glucose (POC) 114 (H) 65 - 100 mg/dL Performed by Jules Sheikh GLUCOSE, POC Collection Time: 20  4:12 PM  
Result Value Ref Range Glucose (POC) 135 (H) 65 - 100 mg/dL Performed by Patricia Abraham Collection Time: 20  8:40 PM  
Result Value Ref Range Vancomycin,trough 16.7 (H) 5.0 - 10.0 ug/mL  Reported dose date: NOT PROVIDED    
 Reported dose time: NOT PROVIDED Reported dose: NOT PROVIDED UNITS  
GLUCOSE, POC Collection Time: 08/31/20  9:50 PM  
Result Value Ref Range Glucose (POC) 122 (H) 65 - 100 mg/dL Performed by Adry Rodriguez PROTHROMBIN TIME + INR Collection Time: 09/01/20  4:30 AM  
Result Value Ref Range INR 1.3 (H) 0.9 - 1.1 Prothrombin time 12.9 (H) 9.0 - 11.1 sec METABOLIC PANEL, BASIC Collection Time: 09/01/20  4:30 AM  
Result Value Ref Range Sodium 138 136 - 145 mmol/L Potassium 3.8 3.5 - 5.1 mmol/L Chloride 105 97 - 108 mmol/L  
 CO2 27 21 - 32 mmol/L Anion gap 6 5 - 15 mmol/L Glucose 83 65 - 100 mg/dL BUN 22 (H) 6 - 20 MG/DL Creatinine 1.67 (H) 0.55 - 1.02 MG/DL  
 BUN/Creatinine ratio 13 12 - 20 GFR est AA 38 (L) >60 ml/min/1.73m2 GFR est non-AA 31 (L) >60 ml/min/1.73m2 Calcium 8.8 8.5 - 10.1 MG/DL  
GLUCOSE, POC Collection Time: 09/01/20  8:10 AM  
Result Value Ref Range Glucose (POC) 93 65 - 100 mg/dL Performed by DELFINA SOLARES(CON) Assessment:  
No evidence of DVT. Principal Problem: 
  Sepsis ([address] (8/20/2020) Active Problems: 
  Disorder of sternoclavicular joint (8/21/2020) MICRO: LIGHT STAPHYLOCOCCUS AUREUS Plan/Recommendations:  
Awaiting rehab bed Leave sutures and staples in for two more weeks Leave JPs in place for 2 more weeks ABX per Dr Johanne Willard 
See orders Signed By: Thong PARDO

## 2020-09-01 NOTE — PROGRESS NOTES
[address] [facility] Adult  Hospitalist Group Hospitalist Progress Note Everton Ferris MD 
Answering service: [phone] OR [phone] from in house phone NAME:  Marvin Fung :  1960 MRN:  [id number] Admission Summary: A 60year old female with past medical history HTN, DM Type II on insulin, Atrial fibrillation/flutter, CKD stage III, recent admission 2020-2020 for left foot cellulitis/OM s/p left BKA, bacteremia MSSA, endocarditis, Pacemaker vegetation s/p lead extraction, presenting to [facility] as a direct transfer from [address] [place] [address] emergency department for thoracic surgery evaluation.  Patient developed right-sided chest pain/tenderness radiating to right shoulder.  Seen at urgent care and found to have atrial fibrillation with RVR. Sent to the emergency department for further evaluation.  In the ED, CT chest demonstrated septic arthritis of the right sternal clavicular joint and no abscess.  Given Merrem and vancomycin and transferred to [facility] 2020 
  
Interval history / Subjective:  
 
Patient seen and examined at the bedside. No complaints of pain, says she is feeling well. Yesterday her bumex dose was decreased in setting of rising Cr. Assessment & Plan:  
 
Sepsis secondary to septic right sternoclavicular joint and abscess- resolved - Recent complicated hospitalization with MSSA bacteremia/endocarditis - Incision and debridement of right sternoclavicular joint.  Resection of medial third of right clavicle.  Placement of wound VAC on  
- OR cx: MSSA 
- continue IV vancomycin - plan is for 6 weeks until 10/5/2020. Will need to see ID within 2 weeks of d/c. Upon discharge will need to place orders for weekly labs with CBC with diff, BMP, and vanc trough to be faxed to Dr. Joseph Westbrook for review - pain control with dilaudid - Thoracic surgery following, will pull drains in a few days - ID consulted, appreciate recs 
  
Atrial fibrillation with RVR 
- rate controlled, off Cardizem drip, continue with oral diltiazem 
- HR improved and < 100  
- restarted on warfarin- pharmacy to dose, INR at 1.3 today Hx of pacemaker lead vegetation and s/p removal of device 
-stable, continue cardiac monitoring 
  
PARAG on CKD stage III  
- creatinine starting to rise, now 1.67 from 1.65 yesterday 
- continue decreased bumex dosing - now at 0.5 BID from 2 mg daily - check repeat Cr, if still elevated will consider consult to Renal 
- renally dose vanc 
  
T2DM, controlled: Episodes of hyperglycemia noted  
-continue long-acting, continue SSI. A1c 6.1, monitor finger stick glucose 
  
HTN  
- BP stable on hydralazine 
  
Depression 
-stable, continue buspirone, Zoloft Vaginal yeast infection  
-continue nystatin Code status: Full Code DVT prophylaxis: SCD, on coumadin, pharmacy to dose Care Plan discussed with: Patient/Family, Nurse and  Anticipated Disposition: TBD Anticipated Discharge: Greater than 48 hours Hospital Problems  Date Reviewed: 8/24/2020 Codes Class Noted POA Disorder of sternoclavicular joint ICD-10-CM: M25.9 ICD-9-CM: 719.91  8/21/2020 Unknown * (Principal) [facility]eps[facility] ([facility] ICD-10-CM: A41.9 ICD-9-CM: 038.9, 995.91  8/20/2020 Yes Vital Signs:  
 Last 24hrs VS reviewed since prior progress note. Most recent are: 
Visit Vitals /74 Pulse 83 Temp 98 °F (36.7 °C) Resp 16 Ht 5' 10\" (1.778 m) Wt 104.7 kg (230 lb 13.2 oz) SpO2 98% BMI 33.12 kg/m² Intake/Output Summary (Last 24 hours) at 9/1/2020 3376 Last data filed at 8/31/2020 1700 Gross per 24 hour Intake 730 ml Output 1810 ml Net -1080 ml Physical Examination:  
 
Constitutional:  No acute distress, cooperative, pleasant ENT:  Oral mucosa moist, oropharynx benign. Resp:  Chest wall incision at R c/d/i, + drains, CTA bilaterally. No wheezing/rhonchi/rales. No accessory muscle use CV:  Regular rhythm, normal rate, no murmurs, gallops, rubs GI:  Soft, non distended, non tender. normoactive bowel sounds, no hepatosplenomegaly Musculoskeletal:  No edema, warm, Neurologic:  L BKA, R arm in sling. AAOx3, cranial nerves grossly normal, normal speech rate and rhythm Data Review:  
 Review and/or order of clinical lab test 
Review and/or order of tests in the radiology section of CPT Review and/or order of tests in the medicine section of CPT Labs:  
 
No results for input(s): WBC, HGB, HCT, PLT, HGBEXT, HCTEXT, PLTEXT, HGBEXT, HCTEXT, PLTEXT in the last 72 hours. Recent Labs  
  09/01/20 
0430 08/31/20 0325 08/30/20 
2519  138 136  
K 3.8 4.0 4.1  106 107 CO2 27 25 22 BUN 22* 20 19 CREA 1.67* 1.65* 1.54* GLU 83 99 101* CA 8.8 9.0 8.8 No results for input(s): ALT, AP, TBIL, TBILI, TP, ALB, GLOB, GGT, AML, LPSE in the last 72 hours. No lab exists for component: SGOT, GPT, AMYP, HLPSE Recent Labs  
  09/01/20 
0430 08/31/20 0325 08/30/20 
6039 INR 1.3* 1.1 1.1 PTP 12.9* 11.7* 11.5* No results for input(s): FE, TIBC, PSAT, FERR in the last 72 hours. Lab Results Component Value Date/Time Folate 8.5 03/14/2020 02:49 PM  
  
No results for input(s): PH, PCO2, PO2 in the last 72 hours. No results for input(s): CPK, CKNDX, TROIQ in the last 72 hours. No lab exists for component: CPKMB Lab Results Component Value Date/Time Cholesterol, total 141 11/11/2019 08:47 AM  
 HDL Cholesterol 32 (L) 11/11/2019 08:47 AM  
 LDL, calculated 82 11/11/2019 08:47 AM  
 Triglyceride 137 11/11/2019 08:47 AM  
 
Lab Results Component Value Date/Time  Glucose (POC) 93 09/01/2020 08:10 AM  
 Glucose (POC) 122 (H) 08/31/2020 09:50 PM  
 Glucose (POC) 135 (H) 08/31/2020 04:12 PM  
 Glucose (POC) 114 (H) 08/31/2020 11:51 AM  
 Glucose (POC) 94 08/31/2020 07:50 AM  
 
Lab Results Component Value Date/Time Color YELLOW/STRAW 05/23/2020 02:35 PM  
 Appearance CLOUDY (A) 05/23/2020 02:35 PM  
 Specific gravity 1.009 05/23/2020 02:35 PM  
 pH (UA) 6.0 05/23/2020 02:35 PM  
 Protein Negative 05/23/2020 02:35 PM  
 Glucose Negative 05/23/2020 02:35 PM  
 Ketone Negative 05/23/2020 02:35 PM  
 Bilirubin Negative 05/23/2020 02:35 PM  
 Urobilinogen 0.2 05/23/2020 02:35 PM  
 Nitrites Positive (A) 05/23/2020 02:35 PM  
 Leukocyte Esterase LARGE (A) 05/23/2020 02:35 PM  
 Epithelial cells FEW 05/23/2020 02:35 PM  
 Bacteria 4+ (A) 05/23/2020 02:35 PM  
 WBC >100 (H) 05/23/2020 02:35 PM  
 RBC 0-5 05/23/2020 02:35 PM  
 
 
 
Medications Reviewed:  
 
Current Facility-Administered Medications Medication Dose Route Frequency  bumetanide (BUMEX) tablet 0.5 mg  0.5 mg Oral BID  
 HYDROmorphone (DILAUDID) tablet 3 mg  3 mg Oral Q4H PRN  
 sodium chloride (NS) flush 5-40 mL  5-40 mL IntraVENous Q8H  
 sodium chloride (NS) flush 5-40 mL  5-40 mL IntraVENous PRN  
 naloxone (NARCAN) injection 0.4 mg  0.4 mg IntraVENous PRN  
 ondansetron (ZOFRAN) injection 4 mg  4 mg IntraVENous Q4H PRN  
 insulin glargine (LANTUS) injection 10 Units  10 Units SubCUTAneous QHS  [Held by provider] vancomycin (VANCOCIN) 1250 mg in  ml infusion  1,250 mg IntraVENous Q48H  Warfarin- Pharmacy Dosing   Other Rx Dosing/Monitoring  HYDROmorphone (PF) (DILAUDID) injection 0.5 mg  0.5 mg IntraVENous Q3H PRN  
 nystatin (MYCOSTATIN) 100,000 unit/gram cream   Topical BID  sodium chloride (NS) flush 5-40 mL  5-40 mL IntraVENous Q8H  
 sodium chloride (NS) flush 5-40 mL  5-40 mL IntraVENous PRN  
 naloxone (NARCAN) injection 0.1 mg  0.1 mg IntraVENous PRN  
 diphenhydrAMINE (BENADRYL) injection 12.5 mg  12.5 mg IntraVENous Q6H PRN  
 bisacodyL (DULCOLAX) tablet 10 mg  10 mg Oral DAILY  hydrALAZINE (APRESOLINE) 20 mg/mL injection 20 mg  20 mg IntraVENous Q6H PRN  
 hydrALAZINE (APRESOLINE) tablet 100 mg  100 mg Oral TID  busPIRone (BUSPAR) tablet 10 mg  10 mg Oral BID  dilTIAZem ER (CARDIZEM CD) capsule 180 mg  180 mg Oral DAILY  sertraline (ZOLOFT) tablet 75 mg  75 mg Oral DAILY  acetaminophen (TYLENOL) tablet 650 mg  650 mg Oral Q6H PRN Or  
 acetaminophen (TYLENOL) suppository 650 mg  650 mg Rectal Q6H PRN  polyethylene glycol (MIRALAX) packet 17 g  17 g Oral DAILY PRN  promethazine (PHENERGAN) tablet 12.5 mg  12.5 mg Oral Q6H PRN Or  
 ondansetron (ZOFRAN) injection 4 mg  4 mg IntraVENous Q6H PRN  Vancomycin - pharmacy to dose   Other Rx Dosing/Monitoring  glucose chewable tablet 16 g  4 Tab Oral PRN  
 glucagon (GLUCAGEN) injection 1 mg  1 mg IntraMUSCular PRN  
 dextrose 10% infusion 0-250 mL  0-250 mL IntraVENous PRN  
 gabapentin (NEURONTIN) capsule 100 mg  100 mg Oral TID PRN  
 insulin regular (NOVOLIN R, HUMULIN R) injection   SubCUTAneous AC&HS  
 
______________________________________________________________________ EXPECTED LENGTH OF STAY: 3d 17h ACTUAL LENGTH OF STAY:          Va Huggins MD

## 2020-09-01 NOTE — PROGRESS NOTES
Problem: Mobility Impaired (Adult and Pediatric) Goal: *Acute Goals and Plan of Care (Insert Text) Description: FUNCTIONAL STATUS PRIOR TO ADMISSION: Patient was modified independent using a rolling walker and and L BKA prosthesis for functional mobility. HOME SUPPORT PRIOR TO ADMISSION: The patient lived with family but did not require assist. 
 
Physical Therapy Goals Initiated 8/23/2020 1. Patient will move from supine to sit and sit to supine , scoot up and down, and roll side to side in bed with minimal assistance/contact guard assist within 7 day(s). 2.  Patient will transfer from bed to chair and chair to bed with minimal assistance/contact guard assist using the least restrictive device within 7 day(s). 3.  Patient will perform sit to stand with minimal assistance/contact guard assist within 7 day(s). 4.  Patient will ambulate with minimal assistance/contact guard assist for 10 feet with the least restrictive device within 7 day(s). Outcome: Progressing Towards Goal 
 
PHYSICAL THERAPY TREATMENT Patient: Ravindra Blanco (89 y.o. female) Date: 9/1/2020 Diagnosis: Sepsis ([facility] [A41.9] Sepsis ([facility] Procedure(s) (LRB): 
RIGHT CHEST WOUND RE EXPLORATION, DELAYED CLOSURE (URGENT) (Right) 8 Days Post-Op Precautions: Fall, Contact right NWB in sling Chart, physical therapy assessment, plan of care and goals were reviewed. ASSESSMENT Patient continues with skilled PT services and is progressing towards goals. Pt was able to improve mobility with decrease assistance. Pt pt had better foot clearance with sidestepping to L/R. Pt was able to transfer to the chair. Pt is utilizing one arm on rolling walker. Will attempt hemiwalker to assist with gait. Due to sling pt required assistance with donning left prothesis. Current Level of Function Impacting Discharge (mobility/balance): mod A x2 Other factors to consider for discharge: R UE sling/ NWB, Left BKA PLAN : 
 Patient continues to benefit from skilled intervention to address the above impairments. Continue treatment per established plan of care. to address goals. Recommendation for discharge: (in order for the patient to meet his/her long term goals) Therapy 3 hours per day 5-7 days per week This discharge recommendation: 
Has not yet been discussed the attending provider and/or case management IF patient discharges home will need the following DME: to be determined (TBD) SUBJECTIVE:  
Patient stated WE can try.  OBJECTIVE DATA SUMMARY:  
Critical Behavior: 
Neurologic State: Alert Orientation Level: Oriented X4 Cognition: Appropriate for age attention/concentration, Follows commands Safety/Judgement: Awareness of environment, Fall prevention Functional Mobility Training:  co-treat with OT. Bed Mobility: 
Rolling: Moderate assistance;Assist x1;Additional time(slightly increased assist to roll R) Sit to Supine: (in chair) Scooting: Contact guard assistance;Minimum assistance;Assist x1(seated EOB) Transfers: 
Sit to Stand: Minimum assistance; Moderate assistance;Assist x2 3 trials Stand to Sit: Moderate assistance;Assist x1 Bed to Chair: Minimum assistance; Moderate assistance;Assist x2 Side step to the L/R with one hand on rolling walker. Balance: 
Sitting: Impaired Sitting - Static: Good (unsupported) Sitting - Dynamic: Good (unsupported); Fair (occasional) Standing: Impaired; With support(RW, RW NWB in sling) Standing - Static: Fair;Constant support Standing - Dynamic : Fair;Constant support Ambulation/Gait Training: 
  
  
  
  
  
  
  
  
  
  
  
  
  
  
  
  
  
  
Stairs: Therapeutic Exercises:  
 
Pain Rating: 
Right knee, right shoulder Activity Tolerance:  
Fair Please refer to the flowsheet for vital signs taken during this treatment. After treatment patient left in no apparent distress: Sitting in chair and Call bell within reach COMMUNICATION/COLLABORATION:  
The patients plan of care was discussed with: Occupational therapist and Registered nurse. Asha Sánchez PTA Time Calculation: 37 mins

## 2020-09-01 NOTE — PROGRESS NOTES
Problem: Risk for Spread of Infection Goal: Prevent transmission of infectious organism to others Description: Prevent the transmission of infectious organisms to other patients, staff members, and visitors. Outcome: Progressing Towards Goal 
  
Problem: Falls - Risk of 
Goal: *Absence of Falls Description: Document Joan Johnston Fall Risk and appropriate interventions in the flowsheet. Outcome: Progressing Towards Goal 
Note: Fall Risk Interventions: 
Mobility Interventions: Patient to call before getting OOB Medication Interventions: Patient to call before getting OOB Elimination Interventions: Toileting schedule/hourly rounds Problem: Pain Goal: *Control of Pain Outcome: Progressing Towards Goal 
Goal: *PALLIATIVE CARE:  Alleviation of Pain Outcome: Progressing Towards Goal 
  
Problem: Pressure Injury - Risk of 
Goal: *Prevention of pressure injury Description: Document Valdemar Scale and appropriate interventions in the flowsheet. Outcome: Progressing Towards Goal 
Note: Pressure Injury Interventions: 
Sensory Interventions: Assess changes in LOC Moisture Interventions: Absorbent underpads Activity Interventions: Increase time out of bed, PT/OT evaluation Mobility Interventions: PT/OT evaluation Nutrition Interventions: Document food/fluid/supplement intake Friction and Shear Interventions: Foam dressings/transparent film/skin sealants, Apply protective barrier, creams and emollients, HOB 30 degrees or less, Lift sheet, Minimize layers

## 2020-09-01 NOTE — PROGRESS NOTES
9/1/2020 -  
TEE: 
- RUR: 33% 
- Disposition is TBD: possible placement at [facility] - Patient will need BLS transport - Cr has trended up 
- 2x YOANA Drains continue - Bumex was decreased CRM: Ardyce Severance, MPH, CHES; Z: [phone] 
 
09:40 -  
CM was notified by thoracic that patient has a [employer] ins plan, not a [employer] plan. CM reviewed patient's [employer] referral and verified that listed ins is a [employer] plan. CM verified that [facility] and  have confirmed patient's ins via Account Notes. CM contacted Eliott Cooks [place] Him: [phone]) to inform of the above and submitted an updated referral with correct ins information with request to re-eval the patient as [facility] is patient's first choice. CRM: Ardyce Severance, MPH, CHES; Z: [phone] 
 
09:50 -  
CM received call from [facility] indicating that referral was likely submitted on wrong patient. CM reviewed and confirmed that initial referral was submitted on wrong patient. CM updated referral request in [employer] and notified [facility] of the above. [facility] is aware of preferences and will follow. CRM: Ardyce Severance, MPH, [address] [place] [address]; Z: [phone]

## 2020-09-01 NOTE — PROGRESS NOTES
Problem: Self Care Deficits Care Plan (Adult) Goal: *Acute Goals and Plan of Care (Insert Text) Description:  
FUNCTIONAL STATUS PRIOR TO ADMISSION: Patient was MOD I for ADLs/iADLs. With L BKA. Using prosthetic for ADL related mobility with walker PTA. Reports she had been home about a month after rehab stay at [facility] prior to this admission. HOME SUPPORT: The patient lived with  but did not require assistance for basic ADLs Occupational Therapy Goals Weekly Re-Assessment 9/1/2020, goals modified below Initiated 8/25/2020 1. Patient will perform grooming seated EOB with moderate assistance within 7 day(s). MODIFIED to utilizing RUE for at least 60% of task 9/1 
2. Patient will perform upper body dressing with minimal assistance/contact guard assist within 7 day(s). MET, UPGRADED to supervision 9/1 3. Patient will perform lower body dressing with moderate assistance  within 7 day(s). CONTINUE 4.  Patient will perform toilet transfers with moderate assistance within 7 day(s). MET, MODIFIED to transfer to/from [facility] with Mod A x1 and least restrictive DME 9/1 5. Patient will perform all aspects of toileting with moderate assistance within 7 day(s). CONTINUE 6. Patient will participate in upper extremity therapeutic exercise/activities with moderate assistance as tolerated for 15 minutes within 7 day(s). CONTINUE 7. Patient will utilize energy conservation techniques during functional activities with verbal cues within 7 day(s). CONTINUE Outcome: Progressing Towards Goal 
  
OCCUPATIONAL THERAPY TREATMENT/WEEKLY RE-ASSESSMENT Patient: Alva Victoria (84 y.o. female) Date: 9/1/2020 Diagnosis: Sepsis ([facility] [A41.9] Sepsis ([address] Procedure(s) (LRB): 
RIGHT CHEST WOUND RE EXPLORATION, DELAYED CLOSURE (URGENT) (Right) 8 Days Post-Op Precautions: Fall, Contact(per thoracic, no restrictions aside from pain with RUE) Chart, occupational therapy assessment, plan of care, and goals were reviewed. ASSESSMENT Patient continues with skilled OT services and is progressing towards goals however remains limited by generalized weakness, RUE NWB restrictions with sling use, decreased distal lower body access & activity tolerance, and impaired RUE AROM noted with bathing, toileting, and mobility tasks completed. Good progress demo's over the week, she continues to progress, able to again advance OOB to the chair with Min-Mod A x1-2 and RW support. RUE ROM remains limited by pain with bathing tasks, unable to perform toileting & lower body dressing tasks d/t weight bearing restrictions, therefore Max-Total A to complete. She remains motivated to progress, would make an excellent IPR candidate, able to tolerate 3 hours/day of therapy. Current Level of Function Impacting Discharge (ADLs): Min A for upper body ADLs, Max-Total A for lower body ADLs Other factors to consider for discharge: fall risk, debility, L BKA with prosthesis, RUE NWB precautions with sling (per thoracic sx) PLAN : 
Goals have been updated based on progression since last assessment. Patient continues to benefit from skilled intervention to address the above impairments. Continue to follow patient 5 times a week to address goals. Recommend with staff: Recommend with nursing patient to complete as able in order to maintain strength, endurance and independence: ADLs with assist PRN, OOB to chair 3x/day and mobilizing to [facility] via pivot vs bedpan for toileting with 2 assist. Thank you for your assistance. Recommend next OT session: BSC transfer, adaptive lower body dressing Recommendation for discharge: (in order for the patient to meet his/her long term goals) Therapy 3 hours per day 5-7 days per week This discharge recommendation: 
Has been made in collaboration with the attending provider and/or case management IF patient discharges home will need the following DME: To be determined (TBD) SUBJECTIVE:  
Patient stated Well I know I can' sit in that chair, my butt won't even fit in that one, I'll never get out.  OBJECTIVE DATA SUMMARY:  
Cognitive/Behavioral Status: 
Neurologic State: Alert Orientation Level: Oriented X4 Cognition: Appropriate for age attention/concentration; Follows commands Perception: Appears intact Perseveration: No perseveration noted Safety/Judgement: Awareness of environment; Fall prevention Functional Mobility and Transfers for ADLs: 
Bed Mobility: 
Rolling: Moderate assistance;Assist x1;Additional time(slightly increased assist to roll R) Sit to Supine: (in chair) Scooting: Contact guard assistance;Minimum assistance;Assist x1(seated EOB) Transfers: 
Sit to Stand: Minimum assistance; Moderate assistance;Assist x2 Functional Transfers Toilet Transfer : Moderate assistance; Additional time(roll in supine for toileting, working toward advance to [facility]) Cues: Physical assistance;Visual cues provided;Verbal cues provided; Tactile cues provided Bed to Chair: Minimum assistance; Moderate assistance;Assist x2 Balance: 
Sitting: Impaired Sitting - Static: Good (unsupported) Sitting - Dynamic: Good (unsupported); Fair (occasional) Standing: Impaired; With support(RW, RW NWB in sling) Standing - Static: Fair;Constant support Standing - Dynamic : Fair;Constant support ADL Intervention: 
  
 
  
 
Upper Body Bathing Bathing Assistance: Set-up(increased time d/t decr RUE ROM & discomfort) Position Performed: Seated edge of bed Cues: Physical assistance; Tactile cues provided;Verbal cues provided;Visual cues provided Lower Body Bathing Bathing Assistance: Maximum assistance Perineal  : Maximum assistance Position Performed: Supine Cues: Verbal cues provided;Visual cues provided; Tactile cues provided Lower Body : Moderate assistance Position Performed: Supine(encouraged EOB & chair with continued completion) Cues: Physical assistance;Verbal cues provided; Tactile cues provided Upper Body Dressing Assistance Dressing Assistance: Minimum assistance Orthotics(Brace): Minimum assistance(RUE sling, CGA to doff, Min A to don) Hospital Gown: Minimum  assistance Cues: Physical assistance; Tactile cues provided;Verbal cues provided;Visual cues provided Lower Body Dressing Assistance Dressing Assistance: Maximum assistance(assist for brief & prosthesis d/t RUE WB limitation) Protective Undergarmet: Maximum assistance Leg Crossed Method Used: No 
 
Toileting Toileting Assistance: Total assistance(dependent)(rolling in supine for hygiene & brief change) Bladder Hygiene: Total assistance (dependent)(purewick) Bowel Hygiene: Total assistance (dependent) Clothing Management: Maximum assistance(able to assist with rolling & bed positioning) Cues: Tactile cues provided;Verbal cues provided;Visual cues provided Cognitive Retraining Safety/Judgement: Awareness of environment; Fall prevention Therapeutic Exercises:  
Sit<>stand x3 with initial Mod A x2 and RW support, progressed with Min-Mod A x2 with increased time and anterior rocking for momentum. Able to sidestep up the bed L/R, pivking up L foot but pivoting R foot side to side. Transferred to the chair, all needs met, technique discussed with RN. Pain: 
R shoulder pain reported (without sling support/with AROM/PROM) Activity Tolerance:  
Good Please refer to the flowsheet for vital signs taken during this treatment. After treatment patient left in no apparent distress:  
Sitting in chair and Call bell within reach COMMUNICATION/COLLABORATION:  
The patients plan of care was discussed with: Physical therapy assistant and Registered nurse. Emily Riley, OTD, OTR/L Time Calculation: 38 mins

## 2020-09-01 NOTE — PROGRESS NOTES
Pharmacist Note - Vancomycin Dosing Therapy day 12 Indication: Suspected infected sternoclavicular joint; recent complicated hospitalization with MSSA bacteremia/endocarditis Current regimen: 1250 mg IV Q48hrs A Trough Level resulted at 16.7 mcg/mL which was obtained ~48 hrs post-dose. Goal trough: 15 - 20 mcg/mL Plan: Continue current regimen. Pharmacy will continue to monitor this patient daily for changes in clinical status and renal function. Kirsten Rausch, PharmD Clinical Pharmacist 
[facility] [phone]

## 2020-09-02 NOTE — PROGRESS NOTES
Pharmacist Note  Warfarin Dosing Consult provided for this 60 y. o.female to manage warfarin for Atrial Fibrillation INR Goal: 2 - 3 Home regimen/ tablet size: 2 mg PO daily Drugs that may increase INR: None Drugs that may decrease INR: None Other current anticoagulants/ drugs that may increase bleeding risk: Sertraline Risk factors: None Daily INR ordered: YES Recent Labs  
  09/02/20 
0227 09/01/20 
0430 08/31/20 
0325 INR 1.4* 1.3* 1.1 Date               INR                  Dose 8/27  --  3 mg  
8/28                1.1                   3 mg 
8/29                1.1                   3 mg 
8/30                1.1                   3 mg 
8/31                1.1                   6 mg 
9/1                  1.3                   5 mg 
9/2                  1.4                   5 mg Assessment/ Plan: Will order warfarin 5 mg PO x 1 to help boost INR. Pharmacy will continue to monitor daily and adjust therapy as indicated. Please contact the pharmacist at  for outpatient recommendations if needed.   
 
Karsten Donovan, PharmD, BCPS

## 2020-09-02 NOTE — PROGRESS NOTES
Occupational Therapy: chart reviewed and cleared by nurse. Patient received in bed, wearing right UE sling and eyes closed. Opened eyes on approach. Patient reporting she was up until 3AM this morning with attempts to start an IV. Tearful with report of attempting to \"get some sleep\". Instructed to perform active right elbow flex/extension, wrist flex/ ext and pronation/supination and finger flex exercises a minimum of 3 times a day for 10 reps to maintain mobility. Patient is alert and oriented. Verbalized understanding and requests to rest at this time. Will follow.  
CINDY Izaguirre 
14:55 to 15:00

## 2020-09-02 NOTE — PROGRESS NOTES
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 F/U wt me in 2 weeks of DC Line per primary team and removal once IV therapy completed Check weekly labs for CBC with differential, BMP and vancomycin trough please fax those results to me for review Kiesha Carcamo,  
1:58 PM

## 2020-09-02 NOTE — PROGRESS NOTES
Problem: Mobility Impaired (Adult and Pediatric) Goal: *Acute Goals and Plan of Care (Insert Text) Description: FUNCTIONAL STATUS PRIOR TO ADMISSION: Patient was modified independent using a rolling walker and and L BKA prosthesis for functional mobility. HOME SUPPORT PRIOR TO ADMISSION: The patient lived with family but did not require assist. 
 
Physical Therapy Goals Initiated 8/23/2020 1. Patient will move from supine to sit and sit to supine , scoot up and down, and roll side to side in bed with minimal assistance/contact guard assist within 7 day(s). 2.  Patient will transfer from bed to chair and chair to bed with minimal assistance/contact guard assist using the least restrictive device within 7 day(s). 3.  Patient will perform sit to stand with minimal assistance/contact guard assist within 7 day(s). 4.  Patient will ambulate with minimal assistance/contact guard assist for 10 feet with the least restrictive device within 7 day(s). Outcome: Progressing Towards Goal 
  
 
PHYSICAL THERAPY TREATMENT Patient: Ryan Yanes (16 y.o. female) Date: 9/2/2020 Diagnosis: Sepsis ([facility] [address] [A41.9] Sepsis ([facility] [address] Procedure(s) (LRB): 
RIGHT CHEST WOUND RE EXPLORATION, DELAYED CLOSURE (URGENT) (Right) 9 Days Post-Op Precautions: Fall, Contact right UE NWB ROM is okay Chart, physical therapy assessment, plan of care and goals were reviewed. ASSESSMENT Patient continues with skilled PT services and is progressing towards goals. Pt reports being sleepy today. Pt utilized hemiwalker but reports decrease comfort and stability. Pt was able to attempt gait but limited left weightshift to advance right LE. Pt hesitant to continue. Pt deferred OOB  to chair due to wanting to sleep. Current Level of Function Impacting Discharge (mobility/balance): mod A Other factors to consider for discharge: decrease gait tolerance, Right UE NWB, L BKA PLAN : 
 Patient continues to benefit from skilled intervention to address the above impairments. Continue treatment per established plan of care. to address goals. Recommendation for discharge: (in order for the patient to meet his/her long term goals) Therapy 3 hours per day 5-7 days per week This discharge recommendation: 
Has not yet been discussed the attending provider and/or case management IF patient discharges home will need the following DME: to be determined (TBD) SUBJECTIVE:  
Patient stated Prashant Grande had me up all night last night .  OBJECTIVE DATA SUMMARY:  
Critical Behavior: 
Neurologic State: Alert Orientation Level: Oriented X4 Cognition: Appropriate decision making, Appropriate safety awareness, Follows commands Safety/Judgement: Awareness of environment, Fall prevention Functional Mobility Training: 
Bed Mobility: 
  
Supine to Sit: Moderate assistance Sit to Supine: Minimum assistance Transfers: 
Sit to Stand: Moderate assistance(elevated surface) Stand to Sit: Minimum assistance Balance: 
Sitting: Impaired Sitting - Static: Good (unsupported) Sitting - Dynamic: Good (unsupported) Standing: Impaired Standing - Static: Fair Standing - Dynamic : Fair Ambulation/Gait Training: 
Distance (ft): 4 Feet (ft) Assistive Device: Gait belt;Walker hattie Ambulation - Level of Assistance: Minimal assistance Gait Abnormalities: Antalgic;Decreased step clearance; Step to gait Base of Support: Shift to right Step Length: Left shortened;Right shortened Stairs: Therapeutic Exercises:  
 
Pain Rating: 
Right shoulder Activity Tolerance:  
Fair Please refer to the flowsheet for vital signs taken during this treatment. After treatment patient left in no apparent distress:  
Supine in bed and Call bell within reach COMMUNICATION/COLLABORATION:  
 The patients plan of care was discussed with: Registered nurse. Lyn Álvarez PTA Time Calculation: 24 mins

## 2020-09-02 NOTE — PROGRESS NOTES
Thoracic Surgery Simple Progress Note Admit Date: 2020 POD: 9 Days Post-Op Procedure:  Procedure(s): RIGHT CHEST WOUND RE EXPLORATION, DELAYED CLOSURE (URGENT) Subjective:  
 
Patient has complaints: No significant medical complaints Review of Systems: 
CARDIAC: positive for paroxysmal Afib, s/p pacer removal for lead infection/vegetation RESP: +R sternoclavicular joint infection NEURO:  negative INCISION: Clean, dry, and intact EXT: Denies new swelling or pain in the legs or calves Objective:  
 
Blood pressure 183/80, pulse 80, temperature 97.6 °F (36.4 °C), resp. rate 18, height 5' 10\" (1.778 m), weight 231 lb 6.4 oz (105 kg), SpO2 98 %. Temp (24hrs), Av °F (36.7 °C), Min:97.6 °F (36.4 °C), Max:98.5 °F (36.9 °C) Hemodynamics PAP 
  CO 
  CI 
 
 07 - 1900 In: -  
Out: 667 [ZEDON:756] 1901 -  07 In: -  
Out: 6212 [Urine:1750; Drains:40] EXAM: 
GENERAL: VSS, afrible, alert and cooperative HEART:  regular rate and rhythm LUNG: clear to auscultation bilaterally NEURO:  mental status, speech normal, alert and oriented x iii INCISION: Clean, dry, and intact staples and sutures, JPs in place minimal output, Wound is healing well, very little swelling EXTREMITIES:No evidence of DVT seen on physical exam.Lt BKA GI/: Abd soft, nonterder with + bowel sounds. Voiding Labs: 
Recent Results (from the past 24 hour(s)) GLUCOSE, POC Collection Time: 20 12:16 PM  
Result Value Ref Range Glucose (POC) 118 (H) 65 - 100 mg/dL Performed by IMAN SOLARES(CON) GLUCOSE, POC Collection Time: 20  6:05 PM  
Result Value Ref Range Glucose (POC) 108 (H) 65 - 100 mg/dL Performed by  Massed GLUCOSE, POC Collection Time: 20  9:35 PM  
Result Value Ref Range Glucose (POC) 171 (H) 65 - 100 mg/dL Performed by Harriet Prince PROTHROMBIN TIME + INR  Collection Time: 20  2:27 AM  
 Result Value Ref Range INR 1.4 (H) 0.9 - 1.1 Prothrombin time 14.4 (H) 9.0 - 11.1 sec METABOLIC PANEL, BASIC Collection Time: 09/02/20  2:27 AM  
Result Value Ref Range Sodium 138 136 - 145 mmol/L Potassium 3.4 (L) 3.5 - 5.1 mmol/L Chloride 104 97 - 108 mmol/L  
 CO2 26 21 - 32 mmol/L Anion gap 8 5 - 15 mmol/L Glucose 70 65 - 100 mg/dL BUN 20 6 - 20 MG/DL Creatinine 1.70 (H) 0.55 - 1.02 MG/DL  
 BUN/Creatinine ratio 12 12 - 20 GFR est AA 37 (L) >60 ml/min/1.73m2 GFR est non-AA 31 (L) >60 ml/min/1.73m2 Calcium 8.8 8.5 - 10.1 MG/DL  
SAMPLES BEING HELD Collection Time: 09/02/20  2:27 AM  
Result Value Ref Range SAMPLES BEING HELD 1LAV   
 COMMENT Add-on orders for these samples will be processed based on acceptable specimen integrity and analyte stability, which may vary by analyte. SAMPLES BEING HELD Collection Time: 09/02/20  2:27 AM  
Result Value Ref Range SAMPLES BEING HELD 1LAV   
 COMMENT Add-on orders for these samples will be processed based on acceptable specimen integrity and analyte stability, which may vary by analyte. GLUCOSE, POC Collection Time: 09/02/20  7:56 AM  
Result Value Ref Range Glucose (POC) 77 65 - 100 mg/dL Performed by Payton Figueroa Assessment:  
No evidence of DVT. Principal Problem: 
  Sepsis ([address] (8/20/2020) Active Problems: 
  Disorder of sternoclavicular joint (8/21/2020) MICRO: 
HEAVY STAPHYLOCOCCUS AUREUS Plan/Recommendations:  
Awaiting rehab bed Leave sutures and staples in for two more weeks JPs removed She has follow clinic appointment with us on 9/16/2020 ABX per Dr Aspen Mendoza 
 
 
See orders Signed By: Shay PARDO

## 2020-09-02 NOTE — PROGRESS NOTES
Bedside shift change report given to Stephanie Velez (oncoming nurse) by Darius Cole (offgoing nurse). Report included the following information SBAR, Kardex and MAR.

## 2020-09-02 NOTE — PROGRESS NOTES
9/2/2020 -  
TEE: 
- RUR: 30% - Likely disposition is for discharge to [facility], operated by [facility] pending ins verification and auth 
- Patient will need a repeat COVID test; attending is aware - Patient will need 6 week course of IV Vancomycin, until 10/5 
- Patient will need BLS transport - INR is still low - Labs are being monitored - Patient may need nephrology consult CM fax attached copies of patient's ins card to [employer] referral. 
CRM: [facility], [facility]; Z: [phone] 
 
14:00 -  
CM rounded on patient with ID and notified ARLETTE Colindres) of patient's ABX plan. Patient will need a PICC Line prior to discharge; attending is aware. Dougie Meek has been started by [facility]. CRM: Fercho Acosta, MPH, [address] [place] [address]; Z: [phone]

## 2020-09-02 NOTE — PROGRESS NOTES
Problem: Risk for Spread of Infection Goal: Prevent transmission of infectious organism to others Description: Prevent the transmission of infectious organisms to other patients, staff members, and visitors. Outcome: Progressing Towards Goal 
  
Problem: Pain Goal: *Control of Pain Outcome: Progressing Towards Goal 
Goal: *PALLIATIVE CARE:  Alleviation of Pain Outcome: Progressing Towards Goal 
  
Problem: Nutrition Deficit Goal: *Optimize nutritional status Outcome: Progressing Towards Goal 
  
Problem: Pressure Injury - Risk of 
Goal: *Prevention of pressure injury Description: Document Valdemar Scale and appropriate interventions in the flowsheet. Outcome: Progressing Towards Goal 
Note: Pressure Injury Interventions: 
Sensory Interventions: Assess changes in LOC, Avoid rigorous massage over bony prominences, Check visual cues for pain, Discuss PT/OT consult with provider, Keep linens dry and wrinkle-free, Minimize linen layers, Pressure redistribution bed/mattress (bed type), Turn and reposition approx. every two hours (pillows and wedges if needed), Use 30-degree side-lying position Moisture Interventions: Absorbent underpads, Apply protective barrier, creams and emollients, Check for incontinence Q2 hours and as needed, Internal/External urinary devices, Offer toileting Q_hr, Moisture barrier, Limit adult briefs Activity Interventions: Increase time out of bed, Pressure redistribution bed/mattress(bed type) Mobility Interventions: Chair cushion, HOB 30 degrees or less, Pressure redistribution bed/mattress (bed type), Turn and reposition approx. every two hours(pillow and wedges) Nutrition Interventions: Document food/fluid/supplement intake, Offer support with meals,snacks and hydration Friction and Shear Interventions: Apply protective barrier, creams and emollients, HOB 30 degrees or less Problem: Patient Education: Go to Patient Education Activity Goal: Patient/Family Education Outcome: Progressing Towards Goal 
  
Last 3 Recorded Weights in this Encounter 08/31/20 0326 09/01/20 0425 09/02/20 0300 Weight: 107.5 kg (236 lb 15.9 oz) 104.7 kg (230 lb 13.2 oz) 105 kg (231 lb 6.4 oz) Verbal shift change report given to Helga Mooney RN (oncoming nurse) by Emily Mcclelland RN (offgoing nurse). Report included the following information SBAR, Kardex, ED Summary, Procedure Summary, Intake/Output, MAR, Accordion, Recent Results, Med Rec Status, Cardiac Rhythm A FIB and Alarm Parameters .

## 2020-09-03 PROBLEM — M25.9 DISORDER OF STERNOCLAVICULAR JOINT: Status: RESOLVED | Noted: 2020-01-01 | Resolved: 2020-01-01

## 2020-09-03 PROBLEM — A41.9 SEPSIS (HCC): Status: RESOLVED | Noted: 2020-01-01 | Resolved: 2020-01-01

## 2020-09-03 NOTE — PROGRESS NOTES
Pharmacist Note - Vancomycin Dosing Therapy day 15 Indication: suspected infected sternoclavicular joint; recent complicated hospitalization with MSSA bacteremia/endocarditis Current regimen: 1250 mg IV Q48hrs A Trough Level resulted at 14.8 mcg/mL which was obtained 48 hrs post-dose. Goal trough:~15 mcg/mL Plan: Continue current regimen. Regimen had been discontinued this afternoon due to rising SCr, will order one time dose of 1250mg for now and monitor renal function, before resuming maintenance regimen order. Pharmacy will continue to monitor this patient daily for changes in clinical status and renal function.

## 2020-09-03 NOTE — PROGRESS NOTES
Comprehensive Nutrition Assessment Type and Reason for Visit: Lauren Bucio Nutrition Recommendations/Plan: 1. Encouraged PO/ protein with all meals for wound healing. 2. Add \"Bariatric Vitamin Panel\" along with Vit C 500 mg BID, and zinc sulfate 220 mg daily x 10 days for wound healing Nutrition Assessment:   
Pt admitted for sepsis. PMHx:  Gastric bypass (2008), HTN, DM2, Atrial fibrillation/flutter, CKD3. 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 Sepsis on admission 2° infected sternoclavicular joint. I&D and resection of medial 3rd of R clavicle on 8/24, wound VAC off. Continues with IV abx and awaiting placement. Pt visited today. Appetite good (see below). Previous education regarding protein sources and importance for wound healing. Does not like Glucerna but agreeable to trial of Magic Cup and Yogurt as snacks. Food options th biggest barrier to PO along with inability to eat much at one sitting with hx of GBS. Avoid high levels of concentrated sweets but generally does not have major issues with dumping. Had been compliant with vitamins at home but does not want to received this admit. Per previous interview pt reports wt fluctuations of 30# due to fluid status. Wt loss of 37lb (14% BW) over past 8 months with poor PO intake and limited activity with hospitalizations. Hard to discern true weights since recentBKA and fluid fluctuations. However, given recent issues and wound, she is at risk for malnutrition d/t increased nutrient needs. Malnutrition Assessment: 
Malnutrition Status: At risk for malnutrition (specify)(increased needs) Nutritionally Significant Medications: dulcolax, bumex, buspar, cardizem, lantus (10 units), KCl, zoloft, vanco; PRN: zofran, miralax, phenergan Estimated Daily Nutrient Needs: 
Energy (kcal):  2048-9600(MSJ x 1.2-1.3) Protein (g):  124 - 1.2g/kg Fluid (ml/day):  2100 - 1ml/kcal 
 
Nutrition Related Findings:   
Edema: none Last BM: 9/2 Wounds:  Surgical wound(clavicle) Current Nutrition Therapies:  
Diet: consistent CHO Meal intake:  
Patient Vitals for the past 168 hrs: 
 % Diet Eaten 09/03/20 0800 50 % 09/02/20 0759 90 % 08/31/20 1700 30 % 08/31/20 0933 100 % 08/30/20 1225 100 % 08/28/20 1744 10 % 08/28/20 1201 20 % 08/28/20 0903 10 % 08/27/20 1547 90 % 08/27/20 1240 100 % Anthropometric Measures: 
· Height:  5' 10\" (177.8 cm) · Current Body Wt:  103.7 kg (228 lb 9.9 oz) · Admission Body Wt:  231 lb 4.2 oz · Usual Body Wt:  (240-275 lb) · Ideal Body Wt:    lb:  152.4 % · Adjusted Body Weight:  242.1 lb; Weight Adjustment for: Amputation · Adjusted BMI:  34.7 · BMI Categories:  Obese class 1 (BMI 30.0-34. 9) Last 3 Recorded Weights in this Encounter 09/01/20 0425 09/02/20 0300 09/03/20 2584 Weight: 104.7 kg (230 lb 13.2 oz) 105 kg (231 lb 6.4 oz) 103.7 kg (228 lb 9.9 oz) Wt Readings:  
08/21/20 102.8 kg (226 lb 11.2 oz) - bed  
08/20/20 104.9 kg (231 lb 6.4 oz) - bed  
05/06/20 110.2 kg (243 lb) - BKA on 5/1/2020 03/26/20 113.9 kg (251 lb)  
03/19/20 108.2 kg (238 lb 8.6 oz) 01/24/20 111.6 kg (246 lb)  
01/14/20 113.5 kg (250 lb 3.6 oz) Nutrition Diagnosis:  
· Increased nutrient needs related to increased demand for energy/nutrients as evidenced by wounds · Altered GI function(Impaired nutrient utilization) related to altered GI structure as evidenced by (hx of GBS) Nutrition Interventions:  
Food and/or Nutrient Delivery: Continue current diet Nutrition Education and Counseling: Education completed Coordination of Nutrition Care: Continued inpatient monitoring Goals: 
Continued consumption of at least 75% meals with good protein intake Nutrition Monitoring and Evaluation: Behavioral-Environmental Outcomes: Readiness for change, Beliefs and attitudes Food/Nutrient Intake Outcomes: Food and nutrient intake, Vitamin/mineral intake Physical Signs/Symptoms Outcomes: Biochemical data, Weight Discharge Planning:   
Continue current diet Dorcas Botello, RD [address] , Pager #[phone] or via [employer]

## 2020-09-03 NOTE — PROGRESS NOTES
Verbal shift change report given to Joycelyn Shields RN (oncoming nurse) by Harshil Kellogg RN (offgoing nurse). Report included the following information SBAR, Kardex, Intake/Output, Recent Results and Cardiac Rhythm A fib. Last 3 Recorded Weights in this Encounter 09/01/20 0425 09/02/20 0300 09/03/20 3608 Weight: 104.7 kg (230 lb 13.2 oz) 105 kg (231 lb 6.4 oz) 103.7 kg (228 lb 9.9 oz) Patient Vitals for the past 12 hrs: 
 Temp Pulse Resp BP SpO2  
09/03/20 0730 97.8 °F (36.6 °C) 92 16 147/59 96 % 09/03/20 0328 97.6 °F (36.4 °C) 87 16 144/66 97 % 09/02/20 2338 98.3 °F (36.8 °C) 92 18 149/73 91 % 09/02/20 2022 98.6 °F (37 °C) 88 18 177/70 94 %

## 2020-09-03 NOTE — PROGRESS NOTES
Problem: Falls - Risk of 
Goal: *Absence of Falls Description: Document Eli Dear Fall Risk and appropriate interventions in the flowsheet. Outcome: Progressing Towards Goal 
Note: Fall Risk Interventions: 
Mobility Interventions: Communicate number of staff needed for ambulation/transfer, Assess mobility with egress test, OT consult for ADLs, PT Consult for mobility concerns Medication Interventions: Patient to call before getting OOB Elimination Interventions: Call light in reach

## 2020-09-03 NOTE — PROGRESS NOTES
Pharmacist Note  Warfarin Dosing Consult provided for this 60 y. o.female to manage warfarin for Atrial Fibrillation INR Goal: 2 - 3 Home regimen/ tablet size: 2 mg PO daily Drugs that may increase INR: None Drugs that may decrease INR: None Other current anticoagulants/ drugs that may increase bleeding risk: Sertraline Risk factors: None Daily INR ordered: YES Recent Labs  
  09/03/20 
0405 09/02/20 
0227 09/01/20 
0430 INR 1.7* 1.4* 1.3* Date               INR                  Dose 8/27  --  3 mg  
8/28                1.1                   3 mg 
8/29                1.1                   3 mg 
8/30                1.1                   3 mg 
8/31                1.1                   6 mg 
9/1                  1.3                   5 mg 
9/2                  1.4                   5 mg 
9/3                  1.7                   5 mg Assessment/ Plan: Will order warfarin 5 mg PO x 1 to help boost INR. Pharmacy will continue to monitor daily and adjust therapy as indicated. Please contact the pharmacist at  for outpatient recommendations if needed.   
 
Zaida Hall, PharmD, BCPS

## 2020-09-03 NOTE — PROGRESS NOTES
Infectious Disease Progress Note Subjective: Ms Gil Santos seen earlier. She was awaiting pain meds at that time Has some chest wall pain Denied any other issues ROS: 10 point ROS obtained and pertinent positives include above. All others negative. Objective: 
 
Vitals:  
Patient Vitals for the past 24 hrs: 
 Temp Pulse Resp BP SpO2  
09/03/20 1228 97.4 °F (36.3 °C) (!) 101 16 (!) 158/93 94 % 09/03/20 0730 97.8 °F (36.6 °C) 92 16 147/59 96 % 09/03/20 0328 97.6 °F (36.4 °C) 87 16 144/66 97 % 09/02/20 2338 98.3 °F (36.8 °C) 92 18 149/73 91 % 09/02/20 2022 98.6 °F (37 °C) 88 18 177/70 94 % 09/02/20 1536 98 °F (36.7 °C) 86 16 167/74 97 % Physical Exam: 
Gen: No apparent distress HEENT:   no scleral icterus, no thrush, CV: S1,2 heard regularly, no lower extremity edema  Drains removed, + mild erythema around surgical site , no  discharge seen Lungs: Clear to auscultation bilaterally, Abdomen: soft, non tender,  
Skin: no rash , RLE chronic venous changes to skin Musculoskeletal:  No joint edema, erythema or tenderness noted RLE, + LLE BKA Medications: 
 
Current Facility-Administered Medications:  
  warfarin (COUMADIN) tablet 5 mg, 5 mg, Oral, ONCE, Jordana Figueroa MD 
  Vancomycin- Pharmacy Dosing, , Other, Rx Dosing/Monitoring, Jaye Gruber, DO 
  Vancomycin random level- please draw ASAP!, , Other, ONCE, Jordana Figueroa MD 
  potassium chloride SR (KLOR-CON 10) tablet 20 mEq, 20 mEq, Oral, DAILY, Rey Sutton MD, 20 mEq at 09/03/20 6287   bumetanide (BUMEX) tablet 0.5 mg, 0.5 mg, Oral, BID, Rey Sutton MD, 0.5 mg at 09/03/20 6081 
  HYDROmorphone (DILAUDID) tablet 3 mg, 3 mg, Oral, Q4H PRN, Rey Sutton MD 
  sodium chloride (NS) flush 5-40 mL, 5-40 mL, IntraVENous, Q8H, Olaf Anders MD, 10 mL at 09/03/20 0747   sodium chloride (NS) flush 5-40 mL, 5-40 mL, IntraVENous, PRN, Olaf Anders MD 
   naloxone (NARCAN) injection 0.4 mg, 0.4 mg, IntraVENous, PRN, Vicky Hoyt MD 
  ondansetron [facility]) injection 4 mg, 4 mg, IntraVENous, Q4H PRN, Vicky Hoyt MD 
  insulin glargine (LANTUS) injection 10 Units, 10 Units, SubCUTAneous, QHS, Roscoe Fisher MD, 10 Units at 09/02/20 2222   Warfarin- Pharmacy Dosing, , Other, Rx Dosing/Monitoring, Roscoe Fisher MD 
  HYDROmorphone (PF) (DILAUDID) injection 0.5 mg, 0.5 mg, IntraVENous, Q3H PRN, Roscoe Fisher MD, 0.5 mg at 09/03/20 8756   nystatin (MYCOSTATIN) 100,000 unit/gram cream, , Topical, BID, Jacinda Perez MD 
  sodium chloride (NS) flush 5-40 mL, 5-40 mL, IntraVENous, Q8H, Merna Layton PA, 10 mL at 09/03/20 0725   sodium chloride (NS) flush 5-40 mL, 5-40 mL, IntraVENous, PRN, LORNA Agarwal, 10 mL at 09/03/20 0942 
  naloxone [facility]) injection 0.1 mg, 0.1 mg, IntraVENous, PRN, LORNA Agarwal 
  diphenhydrAMINE (BENADRYL) injection 12.5 mg, 12.5 mg, IntraVENous, Q6H PRN, LORNA Agarwal, 12.5 mg at 09/03/20 0939 
  bisacodyL (DULCOLAX) tablet 10 mg, 10 mg, Oral, DAILY, LORNA Agarwal, 10 mg at 08/31/20 7904   hydrALAZINE (APRESOLINE) 20 mg/mL injection 20 mg, 20 mg, IntraVENous, Q6H PRN, Unique Harrell MD, 20 mg at 08/28/20 6419   hydrALAZINE (APRESOLINE) tablet 100 mg, 100 mg, Oral, TID, Madala, Sushma, MD, 100 mg at 09/03/20 0937 
  busPIRone (BUSPAR) tablet 10 mg, 10 mg, Oral, BID, Vicky Hoyt MD, 10 mg at 09/03/20 1372   dilTIAZem ER (CARDIZEM CD) capsule 180 mg, 180 mg, Oral, DAILY, Vicky Hoyt MD, 180 mg at 09/03/20 3469   sertraline (ZOLOFT) tablet 75 mg, 75 mg, Oral, DAILY, Vicky Hoyt MD, 75 mg at 09/03/20 0439   acetaminophen (TYLENOL) tablet 650 mg, 650 mg, Oral, Q6H PRN, 650 mg at 08/21/20 1602 **OR** acetaminophen (TYLENOL) suppository 650 mg, 650 mg, Rectal, Q6H PRN, Vicky Hoyt MD 
   polyethylene glycol (MIRALAX) packet 17 g, 17 g, Oral, DAILY PRN, Leni Rothman MD 
  promethazine (PHENERGAN) tablet 12.5 mg, 12.5 mg, Oral, Q6H PRN **OR** [DISCONTINUED] ondansetron (ZOFRAN) injection 4 mg, 4 mg, IntraVENous, Q6H PRN, Leni Rothman MD 
  glucose chewable tablet 16 g, 4 Tab, Oral, PRN, Chiquita Laguna, [employer] M, DO 
  glucagon (GLUCAGEN) injection 1 mg, 1 mg, IntraMUSCular, PRN, Cristino GRIMES DO 
  dextrose 10% infusion 0-250 mL, 0-250 mL, IntraVENous, PRN, JULY [employer] M, DO 
  gabapentin (NEURONTIN) capsule 100 mg, 100 mg, Oral, TID PRN, Cristino GRIMES DO 
  insulin regular (NOVOLIN R, HUMULIN R) injection, , SubCUTAneous, AC&HS, Cristino GRIMES DO, Stopped at 08/29/20 1630 Labs: 
Recent Results (from the past 24 hour(s)) GLUCOSE, POC Collection Time: 09/02/20  4:12 PM  
Result Value Ref Range Glucose (POC) 112 (H) 65 - 100 mg/dL Performed by Fredrick Guerrero GLUCOSE, POC Collection Time: 09/02/20 10:09 PM  
Result Value Ref Range Glucose (POC) 128 (H) 65 - 100 mg/dL Performed by Elise Vincent PROTHROMBIN TIME + INR Collection Time: 09/03/20  4:05 AM  
Result Value Ref Range INR 1.7 (H) 0.9 - 1.1 Prothrombin time 17.3 (H) 9.0 - 11.1 sec METABOLIC PANEL, BASIC Collection Time: 09/03/20  4:05 AM  
Result Value Ref Range Sodium 138 136 - 145 mmol/L Potassium 3.8 3.5 - 5.1 mmol/L Chloride 105 97 - 108 mmol/L  
 CO2 25 21 - 32 mmol/L Anion gap 8 5 - 15 mmol/L Glucose 62 (L) 65 - 100 mg/dL BUN 20 6 - 20 MG/DL Creatinine 2.13 (H) 0.55 - 1.02 MG/DL  
 BUN/Creatinine ratio 9 (L) 12 - 20 GFR est AA 29 (L) >60 ml/min/1.73m2 GFR est non-AA 24 (L) >60 ml/min/1.73m2 Calcium 8.7 8.5 - 10.1 MG/DL  
GLUCOSE, POC Collection Time: 09/03/20  7:28 AM  
Result Value Ref Range Glucose (POC) 78 65 - 100 mg/dL Performed by Bev Cardenas GLUCOSE, POC  Collection Time: 09/03/20 12:27 PM  
 Result Value Ref Range Glucose (POC) 80 65 - 100 mg/dL Performed by Lenard Lefort Micro:  
 
Wound 8/25/20 Specimen Information:  Tissue STERNUM   
     
Component  Value  Ref Range & Units  Status Special Requests:  MANUBRIUM   Final   
GRAM STAIN  RARE WBCS SEEN     Final   
GRAM STAIN  NO ORGANISMS SEEN     Final   
Culture result: Abnormal        Final   
LIGHT STAPHYLOCOCCUS AUREUS Susceptibility Staphylococcus aureus BRITTON Ciprofloxacin ($)  <=0.5 ug/mL  S Clindamycin ($)  0.25 ug/mL  S Daptomycin ($$$$$)  0.25 ug/mL  S Doxycycline ($$)  <=0.5 ug/mL  S Erythromycin ($$$$)  <=0.25 ug/mL  S Gentamicin ($)  <=0.5 ug/mL  S Levofloxacin ($)  <=0.12 ug/mL  S Linezolid ($$$$$)  2 ug/mL  S Moxifloxacin ($$$$)  <=0.25 ug/mL  S Oxacillin  <=0.25 ug/mL  S Rifampin ($$$$)  <=0.5 ug/mL  S1 Tetracycline  <=1 ug/mL  S Trimeth/Sulfa  <=10 ug/mL  S Vancomycin ($)  1 ug/mL  S Blood: 8/20/20 Specimen Information:  Blood   
     
Component  Value  Ref Range & Units  Status Special Requests:  NO SPECIAL REQUESTS     Preliminary Culture result:  NO GROWTH 4 DAYS Wound Sternoclavicular joint Tissue RIGHT STERNOCLAVICULAR JOINT A   
     
Component  Value  Ref Range & Units  Status Special Requests:  NO SPECIAL REQUESTS     Final   
GRAM STAIN  NO WBC'S SEEN     Final   
GRAM STAIN  NO ORGANISMS SEEN     Final   
Culture result: Abnormal        Final   
HEAVY STAPHYLOCOCCUS AUREUS Susceptibility Staphylococcus aureus BRITTON Ciprofloxacin ($)  <=0.5 ug/mL  S Clindamycin ($)  0.25 ug/mL  S Daptomycin ($$$$$)  0.25 ug/mL  S Doxycycline ($$)  <=0.5 ug/mL  S Erythromycin ($$$$)  <=0.25 ug/mL  S Gentamicin ($)  <=0.5 ug/mL  S Levofloxacin ($)  <=0.12 ug/mL  S Linezolid ($$$$$)  2 ug/mL  S Moxifloxacin ($$$$)  <=0.25 ug/mL  S Oxacillin  <=0.25 ug/mL  S    
 Rifampin ($$$$)  <=0.5 ug/mL  S1 Tetracycline  <=1 ug/mL  S Trimeth/Sulfa  <=10 ug/mL  S Vancomycin ($)  1 ug/mL  S Component  Value  Ref Range & Units  Status Special Requests:    Final   
RIGHT STERNOCLAVICULAR JOINT ABSCESS Culture result:  NO ANAEROBES ISOLATED     Final   
Result History Tissue RIGHT STERNOCLAVICULAR JOINT A   
     
Component  Value  Ref Range & Units  Status Special Requests:  NO SPECIAL REQUESTS     Final   
GRAM STAIN  NO WBC'S SEEN     Final   
GRAM STAIN  NO ORGANISMS SEEN     Final   
Culture result: Abnormal        Final   
HEAVY STAPHYLOCOCCUS AUREUS Susceptibility Staphylococcus aureus BRITTON Ciprofloxacin ($)  <=0.5 ug/mL  S Clindamycin ($)  0.25 ug/mL  S Daptomycin ($$$$$)  0.25 ug/mL  S Doxycycline ($$)  <=0.5 ug/mL  S Erythromycin ($$$$)  <=0.25 ug/mL  S Gentamicin ($)  <=0.5 ug/mL  S Levofloxacin ($)  <=0.12 ug/mL  S Linezolid ($$$$$)  2 ug/mL  S Moxifloxacin ($$$$)  <=0.25 ug/mL  S Oxacillin  <=0.25 ug/mL  S Rifampin ($$$$)  <=0.5 ug/mL  S1 Tetracycline  <=1 ug/mL  S Trimeth/Sulfa  <=10 ug/mL  S Vancomycin ($)  1 ug/mL  S Component  Value  Ref Range & Units  Status Special Requests:    Final   
RIGHT STERNOCLAVICULAR JOINT ABSCESS Culture result:  NO ANAEROBES ISOLATED     Final   
Result History CULTURE, ANAEROBIC on 8/22/2020 Imaging: 
CT chest 8/19/20 
  
FINDINGS: 
  
CHEST WALL: No mass or axillary lymphadenopathy. THYROID: No nodule. MEDIASTINUM: No mass or lymphadenopathy. ASHLYN: No mass or lymphadenopathy. THORACIC AORTA: No aneurysm. MAIN PULMONARY ARTERY: Normal in caliber. TRACHEA/BRONCHI: Patent. ESOPHAGUS: No wall thickening or dilatation. HEART: Coronary artery calcifications are present. PLEURA: No effusion or pneumothorax. LUNGS: No nodule, mass, or airspace disease. INCIDENTALLY IMAGED UPPER ABDOMEN: There is a right adrenal adenoma measuring 2.8 cm. BONES: There is inflammation involving the right sternoclavicular joint. There 
is no focal fluid collection. 
  
IMPRESSION IMPRESSION: 
Suspect septic arthritis of the right sternoclavicular joint. No drainable fluid Collection. TTE 8/20/2020 · LV: Estimated LVEF is 55 - 60%. Visually measured ejection fraction. Normal cavity size and systolic function (ejection fraction normal). Mild concentric hypertrophy. Wall motion: normal. Normal left ventricular strain. · LA: Moderately dilated left atrium. · AV: Aortic valve leaflet calcification present without reduced excursion. · MV: Mitral valve non-specific thickening. Echo Findings Left Ventricle  Normal cavity size and systolic function (ejection fraction normal). Mild concentric hypertrophy. Wall motion: normal. The estimated EF is 55 - 60%. Visually measured ejection fraction. Normal left ventricular strain. Left Atrium  Moderately dilated left atrium. Right Ventricle  Normal cavity size and global systolic function. Right Atrium  Normal cavity size. Aortic Valve  No stenosis and no regurgitation. There is leaflet calcification without reduced excursion. Mitral Valve  No stenosis. Mitral valve non-specific thickening. Trace regurgitation. Tricuspid Valve  Normal valve structure, no stenosis and no regurgitation. Pulmonic Valve  Pulmonic valve not well visualized. Aorta  Normal aortic root. Pericardium  No evidence of pericardial effusion. Assessment / Plan Ms. Shazia Laguna is a 79-year-old lady with a history of diabetes, hypertension, A. 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 She underwent incision and debridement of the right sternoclavicular joint, resection of the middle third of the right clavicle and placement of wound VAC on 8/20/2020. Operative findings include \" jacinta purulence within the [facility] joint which was eroded\" Her blood culture on 8/20/2020 was negative but wound cultures from [facility] joint debridement were positive for MSSA She is currently on IV vancomycin She reports a history of myalgias and muscle issues while on daptomycin previously She lists an allergy to antibiotics including Bactrim as well as penicillin. She had hives with penicillin. Cephalosporin was attempted and she says she passed out almost and had no idea what was happening to her when she received cefepime in the past. 
 
1) sternoclavicular joint abscess, osteomyelitis with cx + for MSSA History of MSSA bacteremia, pacer vegetation status post removal and endocarditis treatment in April to May 2020 Left foot osteomyelitis status post amputation/BKA May 2020 I discussed with the patient that vancomycin is  not the best medication for MSSA. However given her issues with antibiotics in the past to cephalosporins, penicillin and daptomycin,  we will continue with IV vancomycin as choices are limited if renal function allows Recommendations CR > 2 today. patient also on diureteics Hold Vancomycin for now pending levels. Angelina Contreras Check level and will hold Vancomycon for levels > 15 Long term zyvox use is concerning for bone marrow suppression and also interactions with her other medications such as zyvox ( can cause qt prolongation) Overall, compliacted scenario with limited antibiotic choice ( allergies, intoleraces in past to penicillin, cephalosporins and daptomycin per patient ) Would hold off on long term lines until antibiotics readjusted Antibiotic side effects/adverse effects/toxicities discussed including gastrointestinal, renal, hematological , ability to lower siezure threshold, risk for C diff infection. Probiotics, daily yogurt encouraged. Check weekly labs for CBC with differential, BMP, ESR, CRP and vancomycin trough while on iv vancomycin 2) A. fib 3) PARAG Renally dose vancomycin 4) DVT prophylaxis 5) screening hep C antibody negative in March 2020, HIV NR 
 
D/W patient's  and pharmacist today Thank you for the opportunity to participate in the care of this patient. Please contact with questions or concerns.    
 
Jaye Gruber,  
2:30 PM

## 2020-09-03 NOTE — DISCHARGE INSTR - DIET
Goal protein 60-80 grams per day All-in-one chewable vitamins ( will need additional calcium) -** Opurity bariatric vitamins - can be purchased from www. [employer]. PhytoCeutica 
- Bariatric Advantage Vitamins - Celebrate Vitamins

## 2020-09-03 NOTE — PROGRESS NOTES
Problem: Self Care Deficits Care Plan (Adult) Goal: *Acute Goals and Plan of Care (Insert Text) Description:  
FUNCTIONAL STATUS PRIOR TO ADMISSION: Patient was MOD I for ADLs/iADLs. With L BKA. Using prosthetic for ADL related mobility with walker PTA. Reports she had been home about a month after rehab stay at [facility] prior to this admission. HOME SUPPORT: The patient lived with  but did not require assistance for basic ADLs Occupational Therapy Goals Weekly Re-Assessment 9/1/2020, goals modified below Initiated 8/25/2020 1. Patient will perform grooming seated EOB with moderate assistance within 7 day(s). MODIFIED to utilizing RUE for at least 60% of task 9/1 
2. Patient will perform upper body dressing with minimal assistance/contact guard assist within 7 day(s). MET, UPGRADED to supervision 9/1 3. Patient will perform lower body dressing with moderate assistance  within 7 day(s). CONTINUE 4.  Patient will perform toilet transfers with moderate assistance within 7 day(s). MET, MODIFIED to transfer to/from [facility] with Mod A x1 and least restrictive DME 9/1 5. Patient will perform all aspects of toileting with moderate assistance within 7 day(s). CONTINUE 6. Patient will participate in upper extremity therapeutic exercise/activities with moderate assistance as tolerated for 15 minutes within 7 day(s). CONTINUE 7. Patient will utilize energy conservation techniques during functional activities with verbal cues within 7 day(s). CONTINUE Outcome: Progressing Towards Goal 
 OCCUPATIONAL THERAPY TREATMENT Patient: Clary Rubi (44 y.o. female) Date: 9/3/2020 Diagnosis: Sepsis ([facility] [address] [A41.9] Sepsis ([address] Procedure(s) (LRB): 
RIGHT CHEST WOUND RE EXPLORATION, DELAYED CLOSURE (URGENT) (Right) 10 Days Post-Op Precautions: Fall, Contact(per thoracic, no restrictions aside from pain with RUE) Chart, occupational therapy assessment, plan of care, and goals were reviewed. ASSESSMENT Patient continues with skilled OT services and is progressing towards goals. Good participation; 10/10 pain R shoulder when seated for extended period of time due to weight of UE; recommend pillow support at elbow to ease pain when seated: Affect brigt and willing to work despite pain of 5/10 in supine) Current Level of Function Impacting Discharge (ADLs): all ADLs improved by staff reports, S-min A UE ADLs, Mod A x 2 LE ADLs, doning Prosthetic limb S today Other factors to consider for discharge: supportive  PLAN : 
Patient continues to benefit from skilled intervention to address the above impairments. Continue treatment per established plan of care. to address goals. Recommend with staff: up to [facility]; R elbow support in sitting for pain management Recommend next OT session: AROM against gravity in seated position Recommendation for discharge: (in order for the patient to meet his/her long term goals) Therapy 3 hours per day 5-7 days per week This discharge recommendation: 
Has been made in collaboration with the attending provider and/or case management IF patient discharges home will need the following DME: AE: long handled bathing, AE: long handled dressing, bedside commode, hospital bed, prosthetic device, transfer bench, wheelchair, and sliding/transfer board SUBJECTIVE:  
Patient stated I know I will do well in rehab; they really make you work.  OBJECTIVE DATA SUMMARY:  
Cognitive/Behavioral Status: 
Neurologic State: Alert; Appropriate for age Orientation Level: Oriented X4 Cognition: Appropriate decision making; Appropriate for age attention/concentration; Appropriate safety awareness; Follows commands Perception: Appears intact Perseveration: No perseveration noted Safety/Judgement: Fall prevention;Good awareness of safety precautions; Awareness of environment(improved awareness of R UE use/precautions) Functional Mobility and Transfers for ADLs: 
Bed Mobility: 
Supine to Sit: Moderate assistance Transfers: 
Sit to Stand: Moderate assistance(from elevated bed, mod assist of 2 from bedside commode/tanesha) Functional Transfers Toilet Transfer : Moderate assistance;Assist x2; Additional time; Adaptive equipment(per PT report) Balance: 
Sitting: Impaired Sitting - Static: Good (unsupported) Sitting - Dynamic: Good (unsupported) Standing: Impaired Standing - Static: Fair Standing - Dynamic : Fair ADL Intervention: 
Feeding Feeding Assistance: Independent Grooming Grooming Assistance: Set-up Upper Body Bathing Bathing Assistance: (rained pt to stop touching stitches/re infection cont.) Upper Body Dressing Assistance Dressing Assistance: (deferred but based on R UE AROM expect it is S-CGA when seat) Cognitive Retraining Attention to Task: Single task Maintains Attention For (Time): Greater than 10 minutes Following Commands: Follows one step commands/directions; Follows two step commands/directions Safety/Judgement: Fall prevention;Good awareness of safety precautions; Awareness of environment(improved awareness of R UE use/precautions) Therapeutic Exercises: Tolerated HEP training in supine only, due to several hours in chair today Use of large AROM R UE facilitated with \"paint the alphabet on the end of the barn so they can see it from the highway\" Initially able to \"paint\" ~ 6\" letters; did well; encouraged increased AROM/size of letters if pain is tolerated: Performed full 26 letters, requiring all muscles and full AROM of shoulder; required increased time, and cues to continue to \"paint\" as big as possible; Letters today were on average 12-16\". Pain: 
10/10 pain at worst today, it was \"when I was sitting up- when I sit and the weight of my arm hangs it down it is very painful. \" (declined out of bed now from having just returned to bed for UE support.) Activity Tolerance:  
Fair, SpO2 stable on RA, requires rest breaks, and significant improvement Please refer to the flowsheet for vital signs taken during this treatment. After treatment patient left in no apparent distress:  
Supine in bed, Call bell within reach, and Side rails x 3 
 
COMMUNICATION/COLLABORATION:  
The patients plan of care was discussed with: Registered nurse. Concha Humphries OTR/L Time Calculation: 28 mins

## 2020-09-03 NOTE — PROGRESS NOTES
Problem: Mobility Impaired (Adult and Pediatric) Goal: *Acute Goals and Plan of Care (Insert Text) Description: FUNCTIONAL STATUS PRIOR TO ADMISSION: Patient was modified independent using a rolling walker and and L BKA prosthesis for functional mobility. HOME SUPPORT PRIOR TO ADMISSION: The patient lived with family but did not require assist. 
Physical Therapy Goals Reviewed 9/3/20 1. Patient will move from supine to sit and sit to supine , scoot up and down, and roll side to side in bed with minimal assistance/contact guard assist within 7 day(s). 2.  Patient will transfer from bed to chair and chair to bed with minimal assistance/contact guard assist using the least restrictive device within 7 day(s). 3.  Patient will perform sit to stand with minimal assistance/contact guard assist within 7 day(s). 4. Patient will ambulate with min assist/contact guard for 50 feet wearing prothesis with least restrictive device within 7 days Physical Therapy Goals Initiated 8/23/2020 1. Patient will move from supine to sit and sit to supine , scoot up and down, and roll side to side in bed with minimal assistance/contact guard assist within 7 day(s). 2.  Patient will transfer from bed to chair and chair to bed with minimal assistance/contact guard assist using the least restrictive device within 7 day(s). 3.  Patient will perform sit to stand with minimal assistance/contact guard assist within 7 day(s). 4.  Patient will ambulate with minimal assistance/contact guard assist for 10 feet with the least restrictive device within 7 day(s). 9/3/2020 1057 by Rhianna Cisse PT Outcome: Progressing Towards Goal 
 PHYSICAL THERAPY WEEKLY RE EVALUATION Patient: Bird Gaston (85 y.o. female) Date: 9/3/2020 Diagnosis: Sepsis ([address] [A41.9] Sepsis ([address] Procedure(s) (LRB): 
RIGHT CHEST WOUND RE EXPLORATION, DELAYED CLOSURE (URGENT) (Right) 10 Days Post-Op Precautions: Fall, Contact(per thoracic, no restrictions aside from pain with RUE) Chart, physical therapy assessment, plan of care and goals were reviewed. ASSESSMENT Patient continues with skilled PT services and is progressing towards goals. Patient able to don prosthesis today with set up only. She was able to stand from elevated bed with mod assist but needed 2 mod assists to stand from bedside chair and from bedside commode. She was able to increase her ambulation distance today overall but continues to need min assist for balance. Patient improving daily, she will benefit from inpatient rehab before returning home. Current Level of Function Impacting Discharge (mobility/balance): 1-2 assists needed for sit to stand. Min assist needed for balance with gait Other factors to consider for discharge: PLAN : 
Patient continues to benefit from skilled intervention to address the above impairments. Continue treatment per established plan of care. to address goals. Recommendation for discharge: (in order for the patient to meet his/her long term goals) Therapy 3 hours per day 5-7 days per week This discharge recommendation: A follow-up discussion with the attending provider and/or case management is planned IF patient discharges home will need the following DME: to be determined (TBD) SUBJECTIVE:  
Patient stated Pasqual Spells will try but I can't sit in the chair all day like I did yesterday.  OBJECTIVE DATA SUMMARY:  
Critical Behavior: 
Neurologic State: Alert Orientation Level: Oriented X4 Cognition: Follows commands Safety/Judgement: Awareness of environment, Fall prevention Functional Mobility Training: 
Bed Mobility: 
 Supine to Sit: Moderate assistance, additional time Transfers: 
Sit to Stand: Moderate assistance(from elevated bed, mod assist of 2 from bedside commode/chair) Stand to Sit: Minimum assistance(extended standing for hygiene after using commode) Balance: 
Sitting: Impaired Sitting - Static: Good (unsupported) Sitting - Dynamic: Good (unsupported) Standing: Impaired Standing - Static: Fair Standing - Dynamic : Fair Ambulation/Gait Training: 
Distance (ft): 10 Feet (ft)(followed by 3 feet to commode then back to chair) Assistive Device: Gait belt;Walker hattie Ambulation - Level of Assistance: Minimal assistance Gait Abnormalities: Antalgic;Decreased step clearance Base of Support: Shift to right Step Length: Left shortened;Right shortened Therapeutic Exercises:  
Sit to stand x5 Pain Rating: No complaint Activity Tolerance:  
Fair and requires frequent rest breaks Please refer to the flowsheet for vital signs taken during this treatment. After treatment patient left in no apparent distress:  
Sitting in chair and Call bell within reach COMMUNICATION/COLLABORATION:  
The patients plan of care was discussed with: Registered nurse. Bettie Franco, PT Time Calculation: 26 mins

## 2020-09-03 NOTE — PROGRESS NOTES
Problem: Risk for Spread of Infection Goal: Prevent transmission of infectious organism to others Description: Prevent the transmission of infectious organisms to other patients, staff members, and visitors. Outcome: Progressing Towards Goal 
  
Problem: Patient Education:  Go to Education Activity Goal: Patient/Family Education Outcome: Progressing Towards Goal 
  
Problem: Falls - Risk of 
Goal: *Absence of Falls Description: Document Blayne Traylor Fall Risk and appropriate interventions in the flowsheet. Outcome: Progressing Towards Goal 
Note: Fall Risk Interventions: 
Mobility Interventions: Communicate number of staff needed for ambulation/transfer, Patient to call before getting OOB, PT Consult for mobility concerns, Utilize walker, cane, or other assistive device, Utilize gait belt for transfers/ambulation Medication Interventions: Patient to call before getting OOB Elimination Interventions: Call light in reach, Patient to call for help with toileting needs Problem: Patient Education: Go to Patient Education Activity Goal: Patient/Family Education Outcome: Progressing Towards Goal 
  
Problem: Pain Goal: *Control of Pain Outcome: Progressing Towards Goal 
Goal: *PALLIATIVE CARE:  Alleviation of Pain Outcome: Progressing Towards Goal 
  
Problem: Nutrition Deficit Goal: *Optimize nutritional status Outcome: Progressing Towards Goal 
  
Problem: Pressure Injury - Risk of 
Goal: *Prevention of pressure injury Description: Document Valdemar Scale and appropriate interventions in the flowsheet. Outcome: Progressing Towards Goal 
Note: Pressure Injury Interventions: 
Sensory Interventions: Assess need for specialty bed, Discuss PT/OT consult with provider, Keep linens dry and wrinkle-free, Minimize linen layers, Turn and reposition approx. every two hours (pillows and wedges if needed) Moisture Interventions: Absorbent underpads, Apply protective barrier, creams and emollients, Assess need for specialty bed, Check for incontinence Q2 hours and as needed, Internal/External urinary devices, Limit adult briefs, Minimize layers Activity Interventions: PT/OT evaluation, Pressure redistribution bed/mattress(bed type) Mobility Interventions: Assess need for specialty bed, HOB 30 degrees or less, Pressure redistribution bed/mattress (bed type), Turn and reposition approx. every two hours(pillow and wedges) Nutrition Interventions: Document food/fluid/supplement intake, Offer support with meals,snacks and hydration Friction and Shear Interventions: Apply protective barrier, creams and emollients, Lift sheet, Minimize layers, Transferring/repositioning devices Problem: Sepsis: Day 6 Goal: *Oxygen saturation within defined limits Outcome: Progressing Towards Goal 
Goal: *Vital signs within defined limits Outcome: Progressing Towards Goal 
Goal: *Tolerating diet Outcome: Progressing Towards Goal 
Goal: *Demonstrates progressive activity Outcome: Progressing Towards Goal 
Goal: Activity/Safety Outcome: Progressing Towards Goal 
Goal: Nutrition/Diet Outcome: Progressing Towards Goal 
Goal: Discharge Planning Outcome: Progressing Towards Goal

## 2020-09-03 NOTE — PROGRESS NOTES
[facility] Adult  Hospitalist Group Hospitalist Progress Note Alana Amos MD 
Answering service: [phone] OR [phone] from in house phone NAME:  Jerica Mota :  1960 MRN:  [id number] Admission Summary: A 60year old female with past medical history HTN, DM Type II on insulin, Atrial fibrillation/flutter, CKD stage III, recent admission 2020-2020 for left foot cellulitis/OM s/p left BKA, bacteremia MSSA, endocarditis, Pacemaker vegetation s/p lead extraction, presenting to [facility] as a direct transfer from [facility] emergency department for thoracic surgery evaluation.  Patient developed right-sided chest pain/tenderness radiating to right shoulder.  Seen at urgent care and found to have atrial fibrillation with RVR. Sent to the emergency department for further evaluation.  In the ED, CT chest demonstrated septic arthritis of the right sternal clavicular joint and no abscess.  Given Merrem and vancomycin and transferred to [facility] 2020 
  
Interval history / Subjective:  
 
Patient seen and examined at the bedside. No complaints of pain, says she is feeling well. Patient is ready for discharge patient is awaiting authorization at [facility] otherwise doing okay patient will need a home catheter for IV antibiotics Assessment & Plan:  
 
Sepsis secondary to septic right sternoclavicular joint and abscess- resolved - Recent complicated hospitalization with MSSA bacteremia/endocarditis - Incision and debridement of right sternoclavicular joint.  Resection of medial third of right clavicle.  Placement of wound VAC on  
- OR cx: MSSA 
- continue IV vancomycin - plan is for 6 weeks until 10/5/2020. Will need to see ID within 2 weeks of d/c.  Upon discharge will need to place orders for weekly labs with CBC with diff, BMP, and vanc trough to be faxed to Dr. Nikole Hamm for review - pain control with dilaudid - Thoracic surgery following - ID consulted, appreciate recs 
  
Atrial fibrillation with RVR 
- rate controlled, off Cardizem drip, continue with oral diltiazem 
- HR improved and < 100  
- restarted on warfarin- pharmacy to dose, INR at 1.7 today Hx of pacemaker lead vegetation and s/p removal of device 
-stable, continue cardiac monitoring 
  
PARAG on CKD stage III  
- creatinine starting to rise, now 1.67 from 1.65 yesterday 
- continue decreased bumex dosing - now at 0.5 BID from 2 mg daily - check repeat Cr, if still elevated will consider consult to Renal 
- renally dose vanc 
  
T2DM, controlled: Episodes of hyperglycemia noted  
-continue long-acting, continue SSI. A1c 6.1, monitor finger stick glucose 
  
HTN  
- BP stable on hydralazine 
  
Depression 
-stable, continue buspirone, Zoloft Vaginal yeast infection  
-continue nystatin Code status: Full Code DVT prophylaxis: SCD, on coumadin, pharmacy to dose Care Plan discussed with: Patient/Family, Nurse and  Anticipated Disposition: TBD Anticipated Discharge: ready for d/c Hospital Problems  Date Reviewed: 8/24/2020 None Vital Signs:  
 Last 24hrs VS reviewed since prior progress note. Most recent are: 
Visit Vitals BP (!) 158/93 (BP 1 Location: Left arm, BP Patient Position: At rest) Pulse (!) 101 Temp 97.4 °F (36.3 °C) Resp 16 Ht 5' 10\" (1.778 m) Wt 103.7 kg (228 lb 9.9 oz) SpO2 94% BMI 32.80 kg/m² Intake/Output Summary (Last 24 hours) at 9/3/2020 1233 Last data filed at 9/3/2020 1005 Gross per 24 hour Intake 240 ml Output 2500 ml Net -2260 ml Physical Examination:  
 
Constitutional:  No acute distress, cooperative, pleasant ENT:  Oral mucosa moist, oropharynx benign. Resp:  Chest wall incision at R c/d/i, + drains, CTA bilaterally.  No wheezing/rhonchi/rales. No accessory muscle use CV:  Regular rhythm, normal rate, no murmurs, gallops, rubs GI:  Soft, non distended, non tender. normoactive bowel sounds, no hepatosplenomegaly Musculoskeletal:  No edema, warm, Neurologic:  L BKA, R arm in sling. AAOx3, cranial nerves grossly normal, normal speech rate and rhythm Data Review:  
 Review and/or order of clinical lab test 
Review and/or order of tests in the radiology section of CPT Review and/or order of tests in the medicine section of CPT Labs:  
 
No results for input(s): WBC, HGB, HCT, PLT, HGBEXT, HCTEXT, PLTEXT, HGBEXT, HCTEXT, PLTEXT in the last 72 hours. Recent Labs  
  09/03/20 0405 09/02/20 0227 09/01/20 
0430  138 138  
K 3.8 3.4* 3.8  104 105 CO2 25 26 27 BUN 20 20 22* CREA 2.13* 1.70* 1.67* GLU 62* 70 83 CA 8.7 8.8 8.8 No results for input(s): ALT, AP, TBIL, TBILI, TP, ALB, GLOB, GGT, AML, LPSE in the last 72 hours. No lab exists for component: SGOT, GPT, AMYP, HLPSE Recent Labs  
  09/03/20 0405 09/02/20 0227 09/01/20 
0430 INR 1.7* 1.4* 1.3* PTP 17.3* 14.4* 12.9* No results for input(s): FE, TIBC, PSAT, FERR in the last 72 hours. Lab Results Component Value Date/Time Folate 8.5 03/14/2020 02:49 PM  
  
No results for input(s): PH, PCO2, PO2 in the last 72 hours. No results for input(s): CPK, CKNDX, TROIQ in the last 72 hours. No lab exists for component: CPKMB Lab Results Component Value Date/Time Cholesterol, total 141 11/11/2019 08:47 AM  
 HDL Cholesterol 32 (L) 11/11/2019 08:47 AM  
 LDL, calculated 82 11/11/2019 08:47 AM  
 Triglyceride 137 11/11/2019 08:47 AM  
 
Lab Results Component Value Date/Time Glucose (POC) 80 09/03/2020 12:27 PM  
 Glucose (POC) 78 09/03/2020 07:28 AM  
 Glucose (POC) 128 (H) 09/02/2020 10:09 PM  
 Glucose (POC) 112 (H) 09/02/2020 04:12 PM  
 Glucose (POC) 87 09/02/2020 12:42 PM  
 
Lab Results Component Value Date/Time Color YELLOW/STRAW 05/23/2020 02:35 PM  
 Appearance CLOUDY (A) 05/23/2020 02:35 PM  
 Specific gravity 1.009 05/23/2020 02:35 PM  
 pH (UA) 6.0 05/23/2020 02:35 PM  
 Protein Negative 05/23/2020 02:35 PM  
 Glucose Negative 05/23/2020 02:35 PM  
 Ketone Negative 05/23/2020 02:35 PM  
 Bilirubin Negative 05/23/2020 02:35 PM  
 Urobilinogen 0.2 05/23/2020 02:35 PM  
 Nitrites Positive (A) 05/23/2020 02:35 PM  
 Leukocyte Esterase LARGE (A) 05/23/2020 02:35 PM  
 Epithelial cells FEW 05/23/2020 02:35 PM  
 Bacteria 4+ (A) 05/23/2020 02:35 PM  
 WBC >100 (H) 05/23/2020 02:35 PM  
 RBC 0-5 05/23/2020 02:35 PM  
 
 
 
Medications Reviewed:  
 
Current Facility-Administered Medications Medication Dose Route Frequency  potassium chloride SR (KLOR-CON 10) tablet 20 mEq  20 mEq Oral DAILY  bumetanide (BUMEX) tablet 0.5 mg  0.5 mg Oral BID  
 HYDROmorphone (DILAUDID) tablet 3 mg  3 mg Oral Q4H PRN  
 sodium chloride (NS) flush 5-40 mL  5-40 mL IntraVENous Q8H  
 sodium chloride (NS) flush 5-40 mL  5-40 mL IntraVENous PRN  
 naloxone (NARCAN) injection 0.4 mg  0.4 mg IntraVENous PRN  
 ondansetron (ZOFRAN) injection 4 mg  4 mg IntraVENous Q4H PRN  
 insulin glargine (LANTUS) injection 10 Units  10 Units SubCUTAneous QHS  Warfarin- Pharmacy Dosing   Other Rx Dosing/Monitoring  HYDROmorphone (PF) (DILAUDID) injection 0.5 mg  0.5 mg IntraVENous Q3H PRN  
 nystatin (MYCOSTATIN) 100,000 unit/gram cream   Topical BID  sodium chloride (NS) flush 5-40 mL  5-40 mL IntraVENous Q8H  
 sodium chloride (NS) flush 5-40 mL  5-40 mL IntraVENous PRN  
 naloxone (NARCAN) injection 0.1 mg  0.1 mg IntraVENous PRN  
 diphenhydrAMINE (BENADRYL) injection 12.5 mg  12.5 mg IntraVENous Q6H PRN  
 bisacodyL (DULCOLAX) tablet 10 mg  10 mg Oral DAILY  hydrALAZINE (APRESOLINE) 20 mg/mL injection 20 mg  20 mg IntraVENous Q6H PRN  
 hydrALAZINE (APRESOLINE) tablet 100 mg  100 mg Oral TID  busPIRone (BUSPAR) tablet 10 mg  10 mg Oral BID  dilTIAZem ER (CARDIZEM CD) capsule 180 mg  180 mg Oral DAILY  sertraline (ZOLOFT) tablet 75 mg  75 mg Oral DAILY  acetaminophen (TYLENOL) tablet 650 mg  650 mg Oral Q6H PRN Or  
 acetaminophen (TYLENOL) suppository 650 mg  650 mg Rectal Q6H PRN  polyethylene glycol (MIRALAX) packet 17 g  17 g Oral DAILY PRN  promethazine (PHENERGAN) tablet 12.5 mg  12.5 mg Oral Q6H PRN  
 glucose chewable tablet 16 g  4 Tab Oral PRN  
 glucagon (GLUCAGEN) injection 1 mg  1 mg IntraMUSCular PRN  
 dextrose 10% infusion 0-250 mL  0-250 mL IntraVENous PRN  
 gabapentin (NEURONTIN) capsule 100 mg  100 mg Oral TID PRN  
 insulin regular (NOVOLIN R, HUMULIN R) injection   SubCUTAneous AC&HS  
 
______________________________________________________________________ EXPECTED LENGTH OF STAY: 3d 17h ACTUAL LENGTH OF STAY:          Marika Leigh MD

## 2020-09-03 NOTE — PROGRESS NOTES
1200: Patient has PICC line ordered PICC team called. Patient had BL mastectomy so PICC line is contraindication. They can not Put PICC line. 1400: Plan for NOn tunnel CVC instead of PICC. Called IR. Patient need to be NPO For procedureIR will do tomorrow morning. 1940: Bedside and Verbal shift change report given to GURINDER Burns (oncoming nurse) by Ethel Rodriguez (offgoing nurse). Report included the following information SBAR, Kardex, ED Summary, Procedure Summary, Intake/Output, MAR, Recent Results and Cardiac Rhythm A fib.

## 2020-09-03 NOTE — PROGRESS NOTES
Bedside and Verbal shift change report given to GURINDER Chang (oncoming nurse) by Ethel Rodriguez (offgoing nurse). Report included the following information SBAR, Kardex, ED Summary, OR Summary, Procedure Summary, Intake/Output, MAR, Recent Results and Cardiac Rhythm A fib.

## 2020-09-03 NOTE — PROGRESS NOTES
9/3/2020 -  
TEE: 
- RUR: 31% 
- Disposition is for discharge to [facility] 
- Patient will need BLS transport; on will call with [facility] for 9/4 
 
- Patient will need a PICC Line prior to discharge 
- COVID test is pending final result - YOANA Drains were removed 9/2 
- ABX continue 08:30 -  
CM contacted [facility]: [phone]) and left a  requesting a return call to check on status of patient's auth. CRM: Loyd Aviles MPH, [facility]; Z: [phone] 
 
11:15 -  
CM contacted [facility]: [phone]) and left a VM requesting a return call to check on status of patient's auth. CRM[facility]; Z: [phone] 
 
11:30 - Auth is in place for patient, but [facility] doesn't have a bed open today. Patient is COVID negative 9/2. PICC Line to be placed prior to discharge. Attending aware of the above. CM placed patient on will call with [facility] phone [phone] CRM: Loyd Aviles MPH, [facility]; Z: [phone] 
 
14:00 -  
CM was notified by nursing that patient cannot have a PICC Line placed due to previous medical hx of double mastectomy. Attending was notified. Orders for IR eval for Simone Cath placed. Per ID, patient will need to have vanc adjusted due to Cr trending up. Updated ABX orders are pending. CRM: Loyd Aviles, MPH, [address] [place] [address]; Z: [phone]

## 2020-09-03 NOTE — DISCHARGE INSTRUCTIONS
Discharge Instructions       PATIENT ID: Erin Bhatt  MRN: [id number]   YOB: 1960    DATE OF ADMISSION: 8/20/2020  7:16 AM    DATE OF DISCHARGE: 9/3/2020    PRIMARY CARE PROVIDER: Navid Rivera MD     ATTENDING PHYSICIAN: Dl St MD  DISCHARGING PROVIDER: Glenice Cushing, MD    To contact this individual call [phone] and ask the  to page. If unavailable ask to be transferred the Adult Hospitalist Department. DISCHARGE DIAGNOSES Sepsis     CONSULTATIONS: IP CONSULT TO HOSPITALIST  IP CONSULT TO INFECTIOUS DISEASES    PROCEDURES/SURGERIES: Procedure(s):  RIGHT CHEST WOUND RE EXPLORATION, DELAYED CLOSURE (URGENT)    PENDING TEST RESULTS:   At the time of discharge the following test results are still pending:     FOLLOW UP APPOINTMENTS:   Follow-up Information     Follow up With Specialties Details Why Contact Info    Navid Rivera MD Internal Medicine In 1 week  [address]  [phone]             ADDITIONAL CARE RECOMMENDATIONS:  Please f/u with PCP for INR check  IV abx as per ID physician    DIET: Cardiac Diet and Diabetic Diet    ACTIVITY: Activity as tolerated    WOUND CARE:     EQUIPMENT needed:       DISCHARGE MEDICATIONS:   See Medication Reconciliation Form    · It is important that you take the medication exactly as they are prescribed. · Keep your medication in the bottles provided by the pharmacist and keep a list of the medication names, dosages, and times to be taken in your wallet. · Do not take other medications without consulting your doctor. NOTIFY YOUR PHYSICIAN FOR ANY OF THE FOLLOWING:   Fever over 101 degrees for 24 hours. Chest pain, shortness of breath, fever, chills, nausea, vomiting, diarrhea, change in mentation, falling, weakness, bleeding. Severe pain or pain not relieved by medications. Or, any other signs or symptoms that you may have questions about.       DISPOSITION:  x  Home With:   OT PT    RN       SNF/Inpatient Rehab/LTAC    Independent/assisted living    Hospice    Other:     CDMP Checked:   Yes x     PROBLEM LIST Updated:  Yes x       Signed:   Gelacio Celeste MD  9/3/2020  11:29 AM

## 2020-09-04 NOTE — PROGRESS NOTES
Pharmacist Note  Warfarin Dosing Consult provided for this 60 y. o.female to manage warfarin for Atrial Fibrillation INR Goal: 2 - 3 Home regimen/ tablet size: 2 mg PO daily Drugs that may increase INR: None Drugs that may decrease INR: None Other current anticoagulants/ drugs that may increase bleeding risk: Sertraline Risk factors: None Daily INR ordered: YES Recent Labs  
  09/04/20 
0448 09/03/20 
0405 09/02/20 
3696 INR 2.4* 1.7* 1.4* Date               INR                  Dose 8/27  --  3 mg  
8/28                1.1                   3 mg 
8/29                1.1                   3 mg 
8/30                1.1                   3 mg 
8/31                1.1                   6 mg 
9/1                  1.3                   5 mg 
9/2                  1.4                   5 mg 
9/3                  1.7                   5 mg 
9/4                  2.4                   2.5 mg 
                                                                            
Assessment/ Plan: Will order warfarin 2.5 mg PO x 1. Pharmacy will continue to monitor daily and adjust therapy as indicated. Please contact the pharmacist at  for outpatient recommendations if needed.   
 
 
Kristen Antonio, PharmD, BCPS

## 2020-09-04 NOTE — PROGRESS NOTES
[address] [facility] Adult  Hospitalist Group Hospitalist Progress Note Jovan Wilkes MD 
Answering service: [phone] OR [phone] from in house phone NAME:  Hunter Maynard :  1960 MRN:  [id number] Admission Summary: A 60year old female with past medical history HTN, DM Type II on insulin, Atrial fibrillation/flutter, CKD stage III, recent admission 2020-2020 for left foot cellulitis/OM s/p left BKA, bacteremia MSSA, endocarditis, Pacemaker vegetation s/p lead extraction, presenting to [facility] as a direct transfer from [address] [place] [address] emergency department for thoracic surgery evaluation.  Patient developed right-sided chest pain/tenderness radiating to right shoulder.  Seen at urgent care and found to have atrial fibrillation with RVR. Sent to the emergency department for further evaluation.  In the ED, CT chest demonstrated septic arthritis of the right sternal clavicular joint and no abscess.  Given Merrem and vancomycin and transferred to [facility] 2020 
  
Interval history / Subjective:  
 
Patient seen and examined at the bedside. No complaints of pain, says she is feeling well. D/w ID her renal function is better , ordered Simone catheter, d/w CM she will be transferred to [facility] today with IV abx as per ID No other complains , vanc troph < 15  Labs reviewed Assessment & Plan:  
 
Sepsis secondary to septic right sternoclavicular joint and abscess- resolved - Recent complicated hospitalization with MSSA bacteremia/endocarditis - Incision and debridement of right sternoclavicular joint.  Resection of medial third of right clavicle.  Placement of wound VAC on  
- OR cx: MSSA 
- continue IV vancomycin - plan is for 6 weeks until 10/5/2020. Will need to see ID within 2 weeks of d/c.  Upon discharge will need to place orders for weekly labs with CBC with diff, BMP, and vanc trough to be faxed to Dr. Chance Bejarano for review - pain control with dilaudid - Thoracic surgery following - ID consulted, appreciate recs 
  
Atrial fibrillation with RVR 
- rate controlled, off Cardizem drip, continue with oral diltiazem 
- HR improved and < 100  
- restarted on warfarin- pharmacy to dose, INR at 2.4  today Hx of pacemaker lead vegetation and s/p removal of device 
-stable, continue cardiac monitoring 
  
PARAG on CKD stage III  
- creatinine starting to rise, now 1.67 from 1.65 yesterday--> 1.86  
- continue decreased bumex dosing - now at 0.5 BID from 2 mg daily 
- renally dose vanc 
  
T2DM, controlled: Episodes of hyperglycemia noted  
-continue long-acting, continue SSI. A1c 6.1, monitor finger stick glucose 
  
HTN  
- BP stable on hydralazine 
  
Depression 
-stable, continue buspirone, Zoloft Vaginal yeast infection  
-continue nystatin Code status: Full Code DVT prophylaxis: SCD, on coumadin, pharmacy to dose Care Plan discussed with: Patient/Family, Nurse and  Anticipated Disposition: TBD Anticipated Discharge: ready for d/c Hospital Problems  Date Reviewed: 8/24/2020 None Vital Signs:  
 Last 24hrs VS reviewed since prior progress note. Most recent are: 
Visit Vitals /75 (BP 1 Location: Left arm, BP Patient Position: At rest) Pulse 85 Temp 97.9 °F (36.6 °C) Resp 16 Ht 5' 10\" (1.778 m) Wt 103.7 kg (228 lb 9.9 oz) SpO2 98% BMI 32.80 kg/m² Intake/Output Summary (Last 24 hours) at 9/4/2020 1010 Last data filed at 9/3/2020 1903 Gross per 24 hour Intake  Output 1300 ml Net -1300 ml Physical Examination:  
 
Constitutional:  No acute distress, cooperative, pleasant ENT:  Oral mucosa moist, oropharynx benign. Resp:  Chest wall incision at R c/d/i, + drains, CTA bilaterally. No wheezing/rhonchi/rales. No accessory muscle use CV:  Regular rhythm, normal rate, no murmurs, gallops, rubs GI:  Soft, non distended, non tender. normoactive bowel sounds, no hepatosplenomegaly Musculoskeletal:  No edema, warm, Neurologic:  L BKA, R arm in sling. AAOx3, cranial nerves grossly normal, normal speech rate and rhythm Data Review:  
 Review and/or order of clinical lab test 
Review and/or order of tests in the radiology section of CPT Review and/or order of tests in the medicine section of CPT Labs:  
 
No results for input(s): WBC, HGB, HCT, PLT, HGBEXT, HCTEXT, PLTEXT, HGBEXT, HCTEXT, PLTEXT in the last 72 hours. Recent Labs  
  09/04/20 0440 09/03/20 
0405 09/02/20 
1576  138 138  
K 4.3 3.8 3.4*  
 105 104 CO2 26 25 26 BUN 22* 20 20 CREA 1.86* 2.13* 1.70* GLU 74 62* 70  
CA 8.4* 8.7 8.8 No results for input(s): ALT, AP, TBIL, TBILI, TP, ALB, GLOB, GGT, AML, LPSE in the last 72 hours. No lab exists for component: SGOT, GPT, AMYP, HLPSE Recent Labs  
  09/04/20 0448 09/03/20 
0405 09/02/20 
0999 INR 2.4* 1.7* 1.4* PTP 22.9* 17.3* 14.4* Recent Labs  
  09/03/20 
1452 TIBC 255 PSAT 7* Lab Results Component Value Date/Time Folate 8.5 03/14/2020 02:49 PM  
  
No results for input(s): PH, PCO2, PO2 in the last 72 hours. No results for input(s): CPK, CKNDX, TROIQ in the last 72 hours. No lab exists for component: CPKMB Lab Results Component Value Date/Time Cholesterol, total 141 11/11/2019 08:47 AM  
 HDL Cholesterol 32 (L) 11/11/2019 08:47 AM  
 LDL, calculated 82 11/11/2019 08:47 AM  
 Triglyceride 137 11/11/2019 08:47 AM  
 
Lab Results Component Value Date/Time Glucose (POC) 119 (H) 09/04/2020 08:52 AM  
 Glucose (POC) 65 09/04/2020 07:57 AM  
 Glucose (POC) 167 (H) 09/03/2020 10:25 PM  
 Glucose (POC) 120 (H) 09/03/2020 04:29 PM  
 Glucose (POC) 80 09/03/2020 12:27 PM  
 
Lab Results Component Value Date/Time  Color YELLOW/STRAW 05/23/2020 02:35 PM  
 Appearance CLOUDY (A) 05/23/2020 02:35 PM  
 Specific gravity 1.009 05/23/2020 02:35 PM  
 pH (UA) 6.0 05/23/2020 02:35 PM  
 Protein Negative 05/23/2020 02:35 PM  
 Glucose Negative 05/23/2020 02:35 PM  
 Ketone Negative 05/23/2020 02:35 PM  
 Bilirubin Negative 05/23/2020 02:35 PM  
 Urobilinogen 0.2 05/23/2020 02:35 PM  
 Nitrites Positive (A) 05/23/2020 02:35 PM  
 Leukocyte Esterase LARGE (A) 05/23/2020 02:35 PM  
 Epithelial cells FEW 05/23/2020 02:35 PM  
 Bacteria 4+ (A) 05/23/2020 02:35 PM  
 WBC >100 (H) 05/23/2020 02:35 PM  
 RBC 0-5 05/23/2020 02:35 PM  
 
 
 
Medications Reviewed:  
 
Current Facility-Administered Medications Medication Dose Route Frequency  Vancomycin- Pharmacy Dosing   Other Rx Dosing/Monitoring  potassium chloride SR (KLOR-CON 10) tablet 20 mEq  20 mEq Oral DAILY  bumetanide (BUMEX) tablet 0.5 mg  0.5 mg Oral BID  
 HYDROmorphone (DILAUDID) tablet 3 mg  3 mg Oral Q4H PRN  
 sodium chloride (NS) flush 5-40 mL  5-40 mL IntraVENous Q8H  
 sodium chloride (NS) flush 5-40 mL  5-40 mL IntraVENous PRN  
 naloxone (NARCAN) injection 0.4 mg  0.4 mg IntraVENous PRN  
 ondansetron (ZOFRAN) injection 4 mg  4 mg IntraVENous Q4H PRN  
 insulin glargine (LANTUS) injection 10 Units  10 Units SubCUTAneous QHS  Warfarin- Pharmacy Dosing   Other Rx Dosing/Monitoring  HYDROmorphone (PF) (DILAUDID) injection 0.5 mg  0.5 mg IntraVENous Q3H PRN  
 nystatin (MYCOSTATIN) 100,000 unit/gram cream   Topical BID  sodium chloride (NS) flush 5-40 mL  5-40 mL IntraVENous Q8H  
 sodium chloride (NS) flush 5-40 mL  5-40 mL IntraVENous PRN  
 naloxone (NARCAN) injection 0.1 mg  0.1 mg IntraVENous PRN  
 diphenhydrAMINE (BENADRYL) injection 12.5 mg  12.5 mg IntraVENous Q6H PRN  
 bisacodyL (DULCOLAX) tablet 10 mg  10 mg Oral DAILY  hydrALAZINE (APRESOLINE) 20 mg/mL injection 20 mg  20 mg IntraVENous Q6H PRN  
 hydrALAZINE (APRESOLINE) tablet 100 mg  100 mg Oral TID  busPIRone (BUSPAR) tablet 10 mg  10 mg Oral BID  dilTIAZem ER (CARDIZEM CD) capsule 180 mg  180 mg Oral DAILY  sertraline (ZOLOFT) tablet 75 mg  75 mg Oral DAILY  acetaminophen (TYLENOL) tablet 650 mg  650 mg Oral Q6H PRN Or  
 acetaminophen (TYLENOL) suppository 650 mg  650 mg Rectal Q6H PRN  polyethylene glycol (MIRALAX) packet 17 g  17 g Oral DAILY PRN  promethazine (PHENERGAN) tablet 12.5 mg  12.5 mg Oral Q6H PRN  
 glucose chewable tablet 16 g  4 Tab Oral PRN  
 glucagon (GLUCAGEN) injection 1 mg  1 mg IntraMUSCular PRN  
 dextrose 10% infusion 0-250 mL  0-250 mL IntraVENous PRN  
 gabapentin (NEURONTIN) capsule 100 mg  100 mg Oral TID PRN  
 insulin regular (NOVOLIN R, HUMULIN R) injection   SubCUTAneous AC&HS  
 
______________________________________________________________________ EXPECTED LENGTH OF STAY: 3d 17h ACTUAL LENGTH OF STAY:          15 Ramon Closs, MD

## 2020-09-04 NOTE — PROGRESS NOTES
INR 2.4, [facility] called and said they will not be able to place line today. Dr Ramonita Mathew aware and said he would place order to hold warfarin today and tomorrow to get line in place for longterm abx. Pt updated. Will cont to monitor and implement POC.

## 2020-09-04 NOTE — PROGRESS NOTES
Bedside shift change report given to Kaiden (oncoming nurse) by Lidya Palomino (offgoing nurse). Report included the following information SBAR, Kardex and MAR. Shyann Lo

## 2020-09-04 NOTE — PROGRESS NOTES
Problem: Mobility Impaired (Adult and Pediatric) Goal: *Acute Goals and Plan of Care (Insert Text) Description: FUNCTIONAL STATUS PRIOR TO ADMISSION: Patient was modified independent using a rolling walker and and L BKA prosthesis for functional mobility. HOME SUPPORT PRIOR TO ADMISSION: The patient lived with family but did not require assist. 
Physical Therapy Goals Reviewed 9/3/2020 1. Patient will move from supine to sit and sit to supine , scoot up and down, and roll side to side in bed with minimal assistance/contact guard assist within 7 day(s). 2.  Patient will transfer from bed to chair and chair to bed with minimal assistance/contact guard assist using the least restrictive device within 7 day(s). 3.  Patient will perform sit to stand with minimal assistance/contact guard assist within 7 day(s). 4. Patient will ambulate with min assist/contact guard for 50 feet wearing prothesis with least restrictive device within 7 days Physical Therapy Goals Initiated 8/23/2020 1. Patient will move from supine to sit and sit to supine , scoot up and down, and roll side to side in bed with minimal assistance/contact guard assist within 7 day(s). 2.  Patient will transfer from bed to chair and chair to bed with minimal assistance/contact guard assist using the least restrictive device within 7 day(s). 3.  Patient will perform sit to stand with minimal assistance/contact guard assist within 7 day(s). 4.  Patient will ambulate with minimal assistance/contact guard assist for 10 feet with the least restrictive device within 7 day(s). Outcome: Progressing Towards Goal 
PHYSICAL THERAPY TREATMENT Patient: Pia Yanes (89 y.o. female) Date: 9/4/2020 Diagnosis: Sepsis ([address] [A41.9] Sepsis ([address] Procedure(s) (LRB): 
RIGHT CHEST WOUND RE EXPLORATION, DELAYED CLOSURE (URGENT) (Right) 11 Days Post-Op Precautions: Fall, Contact(per thoracic, no restrictions aside from pain with RUE) Chart, physical therapy assessment, plan of care and goals were reviewed. ASSESSMENT Patient continues with skilled PT services and is progressing towards goals. Pt reports fatigue and a bad night last night, but was agreeable to mobilize with therapy. Transferred supine>sit with modA. Pt was able to don prosthetic with setup. Transferred sit<>stand with modA x2 and took a few side steps along EOB with Kayli x2 and use of hemiwalker. Pt returned to bed and left with all needs met. Continuing to recommend IPR upon discharge. Current Level of Function Impacting Discharge (mobility/balance): modA x2 for sit<>stand transfers Other factors to consider for discharge: NATALIE NUNEZ, CARLOS CHILEL PLAN : 
Patient continues to benefit from skilled intervention to address the above impairments. Continue treatment per established plan of care. to address goals. Recommendation for discharge: (in order for the patient to meet his/her long term goals) Therapy 3 hours per day 5-7 days per week This discharge recommendation: 
Has not yet been discussed the attending provider and/or case management IF patient discharges home will need the following DME: to be determined (TBD) SUBJECTIVE:  
Patient stated I was hoping you would skip me today, but I will try to do what I can to work with you.  OBJECTIVE DATA SUMMARY:  
Critical Behavior: 
Neurologic State: Alert, Appropriate for age Orientation Level: Oriented X4 Cognition: Appropriate decision making, Appropriate for age attention/concentration, Appropriate safety awareness Safety/Judgement: Fall prevention, Good awareness of safety precautions, Awareness of environment(improved awareness of R UE use/precautions) Functional Mobility Training: 
Bed Mobility: 
  
Supine to Sit: Moderate assistance Sit to Supine: Minimum assistance Transfers: 
Sit to Stand: Moderate assistance;Assist x2 Stand to Sit: Minimum assistance Balance: 
Sitting: Impaired Sitting - Static: Good (unsupported) Sitting - Dynamic: Good (unsupported) Standing: Impaired; With support Standing - Static: Fair;Constant support Standing - Dynamic : Fair;Constant support Ambulation/Gait Training: 
Distance (ft): 3 Feet (ft)(side stepping) Assistive Device: Gait belt;Walker hattie Ambulation - Level of Assistance: Minimal assistance;Assist x2 Gait Abnormalities: Antalgic;Decreased step clearance;Trunk sway increased Base of Support: Shift to right Step Length: Right shortened;Left shortened Activity Tolerance:  
Fair Please refer to the flowsheet for vital signs taken during this treatment. After treatment patient left in no apparent distress:  
Supine in bed and Call bell within reach COMMUNICATION/COLLABORATION:  
The patients plan of care was discussed with: Registered nurse. Memo Hall PT, DPT Time Calculation: 16 mins

## 2020-09-04 NOTE — PROGRESS NOTES
Discussed POC with Dr Maddy Carbone, pt to go to IR this afternoon for CC placement for IV abx longterm. Pt needs to be NPO for this specific type. Had breakfast they are aware and plan to do her around 1500. Pt updated. Will cont to monitor and implement POC.

## 2020-09-04 NOTE — PROGRESS NOTES
9/4/2020 -  
TEE: 
- RUR: 38% 
- Disposition is for discharge to [facility] 
- [facility] WILL CALL - [facility] phone [phone] 
 
- Patient to have [address] Cath placed today, 9/4 
- INR is elevated 10:50 - Per [facility] Ashkan Salcedo), the facility doesn't have later beds available. Disposition is for discharge to [facility] 9/5. [facility] is on will call list.  Barrington Anguiano is on hard chart. CM completed: PCS, Face Sheet, H&P. Packet is on hard chart. TRANSFER IS AN EMTALA TRANSFER. CM Team to call Coy Michelle [phone]) for accepting provider and room assignment CRM: Clint Rocha[facility] MPH, [facility]; Z: [phone] 
 
15:00 -  
CM rounded on patient with nursing. Patient's INR is too high for [address] Cath placement. INR to be reversed. Cath will be anticipated for placement on Monday. CM notified [facility] that patient will not discharge over the weekend. CRM: Clint Rocha, MPH, [address] [place] [address]; Z: [phone]

## 2020-09-04 NOTE — PROGRESS NOTES
Problem: Risk for Spread of Infection Goal: Prevent transmission of infectious organism to others Description: Prevent the transmission of infectious organisms to other patients, staff members, and visitors. Outcome: Progressing Towards Goal 
  
Problem: Patient Education:  Go to Education Activity Goal: Patient/Family Education Outcome: Progressing Towards Goal 
  
Problem: Falls - Risk of 
Goal: *Absence of Falls Description: Document Eli Dear Fall Risk and appropriate interventions in the flowsheet. Outcome: Progressing Towards Goal 
Note: Fall Risk Interventions: 
Mobility Interventions: PT Consult for mobility concerns, PT Consult for assist device competence, OT consult for ADLs, Communicate number of staff needed for ambulation/transfer, Bed/chair exit alarm Medication Interventions: Teach patient to arise slowly, Patient to call before getting OOB, Evaluate medications/consider consulting pharmacy, Bed/chair exit alarm Elimination Interventions: Call light in reach, Bed/chair exit alarm, Patient to call for help with toileting needs, Toileting schedule/hourly rounds Problem: Patient Education: Go to Patient Education Activity Goal: Patient/Family Education Outcome: Progressing Towards Goal 
  
Problem: Pain Goal: *Control of Pain Outcome: Progressing Towards Goal 
Goal: *PALLIATIVE CARE:  Alleviation of Pain Outcome: Progressing Towards Goal 
  
Problem: Pressure Injury - Risk of 
Goal: *Prevention of pressure injury Description: Document Valdemar Scale and appropriate interventions in the flowsheet. Outcome: Progressing Towards Goal 
Note: Pressure Injury Interventions: 
Sensory Interventions: Assess changes in LOC Moisture Interventions: Absorbent underpads, Minimize layers Activity Interventions: PT/OT evaluation, Pressure redistribution bed/mattress(bed type) Mobility Interventions: Assess need for specialty bed, PT/OT evaluation Nutrition Interventions: Document food/fluid/supplement intake Friction and Shear Interventions: Apply protective barrier, creams and emollients, Minimize layers Problem: Patient Education: Go to Patient Education Activity Goal: Patient/Family Education Outcome: Progressing Towards Goal

## 2020-09-04 NOTE — PROGRESS NOTES
Infectious Disease Progress Note Subjective: Ms Edith Baptiste seen earlier. She is awaiting a home catheter placement but concerns with INR elevation as per nursing team 
Chest wall pain is slightly better today Denied any other issues ROS: 10 point ROS obtained and pertinent positives include above. All others negative. Objective: 
 
Vitals:  
Patient Vitals for the past 24 hrs: 
 Temp Pulse Resp BP SpO2  
09/04/20 1109 97.9 °F (36.6 °C) 90 16 160/70 96 % 09/04/20 0746 97.9 °F (36.6 °C) 85 16 182/75 98 % 09/04/20 0405 97.8 °F (36.6 °C) 75 16 155/79 98 % 09/04/20 0100 98.1 °F (36.7 °C) 81 15 149/70 98 % 09/04/20 0000     96 % 09/03/20 2038 97.6 °F (36.4 °C) 91 16 162/68 96 % 09/03/20 1532 98.1 °F (36.7 °C) 83 16 146/54 97 % Physical Exam: 
Gen: No apparent distress HEENT:   no scleral icterus, no thrush, CV: S1,2 heard regularly, no lower extremity edema  Drains removed, + mild fading erythema around surgical site , no  discharge seen Lungs: Clear to auscultation bilaterally, Abdomen: soft, non tender,  
Skin: no rash , RLE chronic venous changes to skin Musculoskeletal:  No joint edema, erythema or tenderness noted RLE, + LLE BKA Medications: 
 
Current Facility-Administered Medications:  
  diphenhydrAMINE (BENADRYL) capsule 25 mg, 25 mg, Oral, Q6H PRN, Christiano Hammonds MD, 25 mg at 09/04/20 1108   warfarin (COUMADIN) tablet 2.5 mg, 2.5 mg, Oral, ONCE, Christiano Hammonds MD 
  [START ON 9/5/2020] vancomycin (VANCOCIN) 1250 mg in  ml infusion, 1,250 mg, IntraVENous, Q48H, Christiano Hammonds MD 
  Vancomycin- Pharmacy Dosing, , Other, Rx Dosing/Monitoring, Jaye Gruber,  
  potassium chloride SR (KLOR-CON 10) tablet 20 mEq, 20 mEq, Oral, DAILY, Kayla Metz MD, Stopped at 09/04/20 0900   bumetanide (BUMEX) tablet 0.5 mg, 0.5 mg, Oral, BID, Kayla Metz MD, 0.5 mg at 09/04/20 2703   HYDROmorphone (DILAUDID) tablet 3 mg, 3 mg, Oral, Q4H PRN, Masoud Madrigal MD, 3 mg at 09/04/20 1108   sodium chloride (NS) flush 5-40 mL, 5-40 mL, IntraVENous, Q8H, Allie Hunter MD, 10 mL at 09/03/20 2219   sodium chloride (NS) flush 5-40 mL, 5-40 mL, IntraVENous, PRN, Allie Hunter MD 
  naloxone [facility]) injection 0.4 mg, 0.4 mg, IntraVENous, PRN, Allie Hunter MD 
  ondansetron [facility]) injection 4 mg, 4 mg, IntraVENous, Q4H PRN, Allie Hunter MD 
  insulin glargine (LANTUS) injection 10 Units, 10 Units, SubCUTAneous, QHS, Masoud Madrigal MD, 10 Units at 09/03/20 2230   Warfarin- Pharmacy Dosing, , Other, Rx Dosing/Monitoring, Masoud Madrigal MD 
  HYDROmorphone (PF) (DILAUDID) injection 0.5 mg, 0.5 mg, IntraVENous, Q3H PRN, Masoud Madrigal MD, 0.5 mg at 09/04/20 0202   nystatin (MYCOSTATIN) 100,000 unit/gram cream, , Topical, BID, Jacinda Shell MD 
  sodium chloride (NS) flush 5-40 mL, 5-40 mL, IntraVENous, Q8H, Chaim [place], 10 mL at 09/03/20 2219   sodium chloride (NS) flush 5-40 mL, 5-40 mL, IntraVENous, PRN, LORNA Brennan, 10 mL at 09/03/20 1900 
  naloxone [facility]) injection 0.1 mg, 0.1 mg, IntraVENous, PRN, LORNA Brennan 
  bisacodyL (DULCOLAX) tablet 10 mg, 10 mg, Oral, DAILY, LORNA Brennan, 10 mg at 08/31/20 7930   hydrALAZINE (APRESOLINE) 20 mg/mL injection 20 mg, 20 mg, IntraVENous, Q6H PRN, Solitario Vines MD, 20 mg at 08/28/20 1298   hydrALAZINE (APRESOLINE) tablet 100 mg, 100 mg, Oral, TID, Solitario Vines MD, 100 mg at 09/04/20 8759   busPIRone (BUSPAR) tablet 10 mg, 10 mg, Oral, BID, Allie Hunter MD, 10 mg at 09/04/20 6934   dilTIAZem ER (CARDIZEM CD) capsule 180 mg, 180 mg, Oral, DAILY, Allie Hunter MD, 180 mg at 09/04/20 2375   sertraline (ZOLOFT) tablet 75 mg, 75 mg, Oral, DAILY, Allie Hunter MD, 75 mg at 09/04/20 9719   acetaminophen (TYLENOL) tablet 650 mg, 650 mg, Oral, Q6H PRN, 650 mg at 08/21/20 1602 **OR** acetaminophen (TYLENOL) suppository 650 mg, 650 mg, Rectal, Q6H PRN, Allie Hunter MD 
  polyethylene glycol (MIRALAX) packet 17 g, 17 g, Oral, DAILY PRN, Allie Hunter MD 
  promethazine (PHENERGAN) tablet 12.5 mg, 12.5 mg, Oral, Q6H PRN **OR** [DISCONTINUED] ondansetron (ZOFRAN) injection 4 mg, 4 mg, IntraVENous, Q6H PRN, Allie Hunter MD 
  glucose chewable tablet 16 g, 4 Tab, Oral, PRN, Jaylene Portillo,  
  glucagon (GLUCAGEN) injection 1 mg, 1 mg, IntraMUSCular, PRN, Jaylene Portillo, DO 
  dextrose 10% infusion 0-250 mL, 0-250 mL, IntraVENous, PRN, JULY Portillo [employer] M, DO 
  gabapentin (NEURONTIN) capsule 100 mg, 100 mg, Oral, TID PRN, JULY Portillo [employer] M, DO 
  insulin regular (NOVOLIN R, HUMULIN R) injection, , SubCUTAneous, AC&HS, Jacqueline GRIMES DO, Stopped at 08/29/20 1630 Labs: 
Recent Results (from the past 24 hour(s)) IRON PROFILE Collection Time: 09/03/20  2:52 PM  
Result Value Ref Range Iron 17 (L) 35 - 150 ug/dL TIBC 255 250 - 450 ug/dL Iron % saturation 7 (L) 20 - 50 % [place]id Collection Time: 09/03/20  2:52 PM  
Result Value Ref Range Vancomycin, random 14.8 UG/ML  
GLUCOSE, POC Collection Time: 09/03/20  4:29 PM  
Result Value Ref Range Glucose (POC) 120 (H) 65 - 100 mg/dL Performed by Houston Gallardo GLUCOSE, POC Collection Time: 09/03/20 10:25 PM  
Result Value Ref Range Glucose (POC) 167 (H) 65 - 100 mg/dL Performed by Smita Brown METABOLIC PANEL, BASIC Collection Time: 09/04/20  4:40 AM  
Result Value Ref Range Sodium 137 136 - 145 mmol/L Potassium 4.3 3.5 - 5.1 mmol/L Chloride 105 97 - 108 mmol/L  
 CO2 26 21 - 32 mmol/L Anion gap 6 5 - 15 mmol/L Glucose 74 65 - 100 mg/dL BUN 22 (H) 6 - 20 MG/DL  Creatinine 1.86 (H) 0.55 - 1.02 MG/DL  
 BUN/Creatinine ratio 12 12 - 20    
 GFR est AA 33 (L) >60 ml/min/1.73m2 GFR est non-AA 28 (L) >60 ml/min/1.73m2 Calcium 8.4 (L) 8.5 - 10.1 MG/DL PROTHROMBIN TIME + INR Collection Time: 09/04/20  4:48 AM  
Result Value Ref Range INR 2.4 (H) 0.9 - 1.1 Prothrombin time 22.9 (H) 9.0 - 11.1 sec GLUCOSE, POC Collection Time: 09/04/20  7:57 AM  
Result Value Ref Range Glucose (POC) 65 65 - 100 mg/dL Performed by Prema Rucker GLUCOSE, POC Collection Time: 09/04/20  8:52 AM  
Result Value Ref Range Glucose (POC) 119 (H) 65 - 100 mg/dL Performed by Aria Nieves GLUCOSE, POC Collection Time: 09/04/20 11:08 AM  
Result Value Ref Range Glucose (POC) 96 65 - 100 mg/dL Performed by Aria Nieves Micro:  
 
Wound 8/25/20 Specimen Information:  Tissue STERNUM   
     
Component  Value  Ref Range & Units  Status Special Requests:  MANUBRIUM   Final   
GRAM STAIN  RARE WBCS SEEN     Final   
GRAM STAIN  NO ORGANISMS SEEN     Final   
Culture result: Abnormal        Final   
LIGHT STAPHYLOCOCCUS AUREUS Susceptibility Staphylococcus aureus BRITTON Ciprofloxacin ($)  <=0.5 ug/mL  S Clindamycin ($)  0.25 ug/mL  S Daptomycin ($$$$$)  0.25 ug/mL  S Doxycycline ($$)  <=0.5 ug/mL  S Erythromycin ($$$$)  <=0.25 ug/mL  S Gentamicin ($)  <=0.5 ug/mL  S Levofloxacin ($)  <=0.12 ug/mL  S Linezolid ($$$$$)  2 ug/mL  S Moxifloxacin ($$$$)  <=0.25 ug/mL  S Oxacillin  <=0.25 ug/mL  S Rifampin ($$$$)  <=0.5 ug/mL  S1 Tetracycline  <=1 ug/mL  S Trimeth/Sulfa  <=10 ug/mL  S Vancomycin ($)  1 ug/mL  S Blood: 8/20/20 Specimen Information:  Blood   
     
Component  Value  Ref Range & Units  Status Special Requests:  NO SPECIAL REQUESTS     Preliminary Culture result:  NO GROWTH 4 DAYS Wound Sternoclavicular joint Tissue RIGHT STERNOCLAVICULAR JOINT A   
     
Component  Value  Ref Range & Units  Status Special Requests:  NO SPECIAL REQUESTS     Final   
GRAM STAIN  NO WBC'S SEEN     Final   
GRAM STAIN  NO ORGANISMS SEEN     Final   
Culture result: Abnormal        Final   
HEAVY STAPHYLOCOCCUS AUREUS Susceptibility Staphylococcus aureus BRITTON Ciprofloxacin ($)  <=0.5 ug/mL  S Clindamycin ($)  0.25 ug/mL  S Daptomycin ($$$$$)  0.25 ug/mL  S Doxycycline ($$)  <=0.5 ug/mL  S Erythromycin ($$$$)  <=0.25 ug/mL  S Gentamicin ($)  <=0.5 ug/mL  S Levofloxacin ($)  <=0.12 ug/mL  S Linezolid ($$$$$)  2 ug/mL  S Moxifloxacin ($$$$)  <=0.25 ug/mL  S Oxacillin  <=0.25 ug/mL  S Rifampin ($$$$)  <=0.5 ug/mL  S1 Tetracycline  <=1 ug/mL  S Trimeth/Sulfa  <=10 ug/mL  S Vancomycin ($)  1 ug/mL  S Component  Value  Ref Range & Units  Status Special Requests:    Final   
RIGHT STERNOCLAVICULAR JOINT ABSCESS Culture result:  NO ANAEROBES ISOLATED     Final   
Result History Tissue RIGHT STERNOCLAVICULAR JOINT A   
     
Component  Value  Ref Range & Units  Status Special Requests:  NO SPECIAL REQUESTS     Final   
GRAM STAIN  NO WBC'S SEEN     Final   
GRAM STAIN  NO ORGANISMS SEEN     Final   
Culture result: Abnormal        Final   
HEAVY STAPHYLOCOCCUS AUREUS Susceptibility Staphylococcus aureus BRITTON Ciprofloxacin ($)  <=0.5 ug/mL  S Clindamycin ($)  0.25 ug/mL  S Daptomycin ($$$$$)  0.25 ug/mL  S Doxycycline ($$)  <=0.5 ug/mL  S Erythromycin ($$$$)  <=0.25 ug/mL  S Gentamicin ($)  <=0.5 ug/mL  S Levofloxacin ($)  <=0.12 ug/mL  S Linezolid ($$$$$)  2 ug/mL  S Moxifloxacin ($$$$)  <=0.25 ug/mL  S Oxacillin  <=0.25 ug/mL  S Rifampin ($$$$)  <=0.5 ug/mL  S1 Tetracycline  <=1 ug/mL  S Trimeth/Sulfa  <=10 ug/mL  S Vancomycin ($)  1 ug/mL  S Component  Value  Ref Range & Units  Status Special Requests:    Final   
RIGHT STERNOCLAVICULAR JOINT ABSCESS   
 Culture result:  NO ANAEROBES ISOLATED     Final   
Result History CULTURE, ANAEROBIC on 8/22/2020 Imaging: 
CT chest 8/19/20 
  
FINDINGS: 
  
CHEST WALL: No mass or axillary lymphadenopathy. THYROID: No nodule. MEDIASTINUM: No mass or lymphadenopathy. ASHLYN: No mass or lymphadenopathy. THORACIC AORTA: No aneurysm. MAIN PULMONARY ARTERY: Normal in caliber. TRACHEA/BRONCHI: Patent. ESOPHAGUS: No wall thickening or dilatation. HEART: Coronary artery calcifications are present. PLEURA: No effusion or pneumothorax. LUNGS: No nodule, mass, or airspace disease. INCIDENTALLY IMAGED UPPER ABDOMEN: There is a right adrenal adenoma measuring 2.8 cm. BONES: There is inflammation involving the right sternoclavicular joint. There 
is no focal fluid collection. 
  
IMPRESSION IMPRESSION: 
Suspect septic arthritis of the right sternoclavicular joint. No drainable fluid Collection. TTE 8/20/2020 · LV: Estimated LVEF is 55 - 60%. Visually measured ejection fraction. Normal cavity size and systolic function (ejection fraction normal). Mild concentric hypertrophy. Wall motion: normal. Normal left ventricular strain. · LA: Moderately dilated left atrium. · AV: Aortic valve leaflet calcification present without reduced excursion. · MV: Mitral valve non-specific thickening. Echo Findings Left Ventricle  Normal cavity size and systolic function (ejection fraction normal). Mild concentric hypertrophy. Wall motion: normal. The estimated EF is 55 - 60%. Visually measured ejection fraction. Normal left ventricular strain. Left Atrium  Moderately dilated left atrium. Right Ventricle  Normal cavity size and global systolic function. Right Atrium  Normal cavity size. Aortic Valve  No stenosis and no regurgitation. There is leaflet calcification without reduced excursion. Mitral Valve  No stenosis. Mitral valve non-specific thickening. Trace regurgitation. Tricuspid Valve  Normal valve structure, no stenosis and no regurgitation. Pulmonic Valve  Pulmonic valve not well visualized. Aorta  Normal aortic root. Pericardium  No evidence of pericardial effusion. Assessment / Plan Ms. Keyona Sandy is a 25-year-old lady with a history of diabetes, hypertension, A. 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 
who was transferred from [facility] for right sternoclavicular joint osteomyelitis//septic joint. She underwent incision and debridement of the right sternoclavicular joint, resection of the middle third of the right clavicle and placement of wound VAC on 8/20/2020. Operative findings include \" jacinta purulence within the [place] joint which was eroded\" Her blood culture on 8/20/2020 was negative but wound cultures from [place] joint debridement were positive for MSSA She is currently on IV vancomycin She reports a history of myalgias and muscle issues while on daptomycin previously She lists an allergy to antibiotics including Bactrim as well as penicillin. She had hives with penicillin. Cephalosporin was attempted and she says she passed out almost and had no idea what was happening to her when she received cefepime in the past. 
 
1) sternoclavicular joint abscess, osteomyelitis with cx + for MSSA History of MSSA bacteremia, pacer vegetation status post removal and endocarditis treatment in April to May 2020 Left foot osteomyelitis status post amputation/BKA May 2020 I discussed with the patient that vancomycin is  not the best medication for MSSA.   However given her issues with antibiotics in the past to cephalosporins, penicillin and daptomycin,  we will continue with IV vancomycin as choices are limited if renal function allows Recommendations CR improved to 1.8 today Vancomycin level 14.8 yesterday Discussed with pharmacy for cautious dosing. Currently on 1250 mg every 48 hours with close renal monitoring If creatinine continues to be stable plan to complete vancomycin after detailed discussion with patient about risk for nephrotoxicity given other limited antibiotic options Long term zyvox use is concerning for bone marrow suppression and also interactions with her other medications such as zyvox ( can cause qt prolongation) Overall, compliacted scenario with limited antibiotic choice ( allergies, intoleraces in past to penicillin, cephalosporins and daptomycin per patient ) She will need weekly labs for CBC with differential and BMP, vancomycin trough, ESR and CRP Vancomycin will need to be dosed by pharmacy per levels and creatinine 
follow-up with me within 2 weeks of discharge Antibiotic side effects/adverse effects/toxicities discussed including gastrointestinal, renal, hematological , ability to lower siezure threshold, risk for C diff infection. Probiotics, daily yogurt encouraged. Antibiotic side effects/adverse effects/toxicities discussed including gastrointestinal, renal, hematological , ability to lower siezure threshold, risk for C diff infection. Probiotics, daily yogurt encouraged. 2) A. fib 3) PARAG Renally dose vancomycin 4) DVT prophylaxis 5) screening hep C antibody negative in March 2020, HIV NR 
 
D/W patient's  and pharmacist today Thank you for the opportunity to participate in the care of this patient. Please contact with questions or concerns.    
 
Jaye Gruber,  
2:20 PM

## 2020-09-04 NOTE — PROGRESS NOTES
Bedside and Verbal shift change report given to Lidya Palomino RN (oncoming nurse) by Kymberly Mak RN (offgoing nurse). Report included the following information SBAR, Kardex, Intake/Output, MAR, Recent Results, Med Rec Status, Cardiac Rhythm A fib and Alarm Parameters .

## 2020-09-05 NOTE — PROGRESS NOTES
Problem: Risk for Spread of Infection Goal: Prevent transmission of infectious organism to others Description: Prevent the transmission of infectious organisms to other patients, staff members, and visitors. Outcome: Progressing Towards Goal 
  
Problem: Falls - Risk of 
Goal: *Absence of Falls Description: Document Joel Pruitt Fall Risk and appropriate interventions in the flowsheet. Outcome: Progressing Towards Goal 
Note: Fall Risk Interventions: 
Mobility Interventions: Communicate number of staff needed for ambulation/transfer, PT Consult for mobility concerns, Strengthening exercises (ROM-active/passive), Utilize walker, cane, or other assistive device Medication Interventions: Assess postural VS orthostatic hypotension Elimination Interventions: Call light in reach, Patient to call for help with toileting needs Problem: Pain Goal: *Control of Pain Outcome: Progressing Towards Goal 
  
Problem: Pressure Injury - Risk of 
Goal: *Prevention of pressure injury Description: Document Valdemar Scale and appropriate interventions in the flowsheet. Outcome: Progressing Towards Goal 
Note: Pressure Injury Interventions: 
Sensory Interventions: Assess need for specialty bed, Keep linens dry and wrinkle-free, Maintain/enhance activity level, Minimize linen layers Moisture Interventions: Apply protective barrier, creams and emollients, Absorbent underpads, Internal/External urinary devices, Limit adult briefs Activity Interventions: Increase time out of bed, PT/OT evaluation Mobility Interventions: Assess need for specialty bed, PT/OT evaluation Nutrition Interventions: Document food/fluid/supplement intake Friction and Shear Interventions: Apply protective barrier, creams and emollients, HOB 30 degrees or less Problem: Sepsis: Day 6 Goal: *Oxygen saturation within defined limits Outcome: Progressing Towards Goal 
Goal: *Vital signs within defined limits Outcome: Progressing Towards Goal

## 2020-09-05 NOTE — PROGRESS NOTES
Pharmacist Note  Warfarin Dosing Consult provided for this 60 y. o.female to manage warfarin for Atrial Fibrillation INR Goal: 2 - 3 Home regimen/ tablet size: 2 mg PO daily Drugs that may increase INR: None Drugs that may decrease INR: None Other current anticoagulants/ drugs that may increase bleeding risk: Sertraline Risk factors: None Daily INR ordered: YES Recent Labs  
  09/05/20 
0405 09/04/20 
0448 09/03/20 
0405 INR 2.5* 2.4* 1.7* Date               INR                  Dose 8/27  --  3 mg  
8/28                1.1                   3 mg 
8/29                1.1                   3 mg 
8/30                1.1                   3 mg 
8/31                1.1                   6 mg 
9/1                  1.3                   5 mg 
9/2                  1.4                   5 mg 
9/3                  1.7                   5 mg 
9/4                  2.4                   Held by MD 
9/5                  2.5                   Held by MD 
                                                                            
Assessment/ Plan: 
Continue to hold the warfarin today per Dr. Waleska Rivas in preparation for [facility] catheter placement on Monday. Pharmacy will continue to monitor daily and adjust therapy as indicated. Please contact the pharmacist at  for outpatient recommendations if needed.

## 2020-09-05 NOTE — PROGRESS NOTES
[facility] [facility] Adult  Hospitalist Group Hospitalist Progress Note Saray Meza MD 
Answering service: [phone] OR [phone] from in house phone NAME:  Ana Luisa Fraire :  1960 MRN:  [id number] Admission Summary: A 60year old female with past medical history HTN, DM Type II on insulin, Atrial fibrillation/flutter, CKD stage III, recent admission 2020-2020 for left foot cellulitis/OM s/p left BKA, bacteremia MSSA, endocarditis, Pacemaker vegetation s/p lead extraction, presenting to [facility] [address] [place] [address] as a direct transfer from [address] [place] [address] [place]  emergency department for thoracic surgery evaluation.  Patient developed right-sided chest pain/tenderness radiating to right shoulder.  Seen at urgent care and found to have atrial fibrillation with RVR. Sent to the emergency department for further evaluation.  In the ED, CT chest demonstrated septic arthritis of the right sternal clavicular joint and no abscess.  Given Merrem and vancomycin and transferred to [facility] [address] [place] [address] 2020 
  
Interval history / Subjective:  
 
Patient seen and examined at the bedside. No complaints ordered Simone catheter for the IV antibiotics but she could not get it yesterday due to INR 2.4 now Coumadin is held for 2 consecutive night to take it down to 2 pharmacy is dual dosing, d/w CM she will be transferred to [facility]  IV abx as per ID No other complains , vanc troph < 15  Labs reviewed creatinine 1.99 Assessment & Plan:  
 
Sepsis secondary to septic right sternoclavicular joint and abscess- resolved - Recent complicated hospitalization with MSSA bacteremia/endocarditis - Incision and debridement of right sternoclavicular joint.  Resection of medial third of right clavicle.  Placement of wound VAC on  
- OR cx: MSSA 
- continue IV vancomycin - plan is for 6 weeks until 10/5/2020.  Will need to see ID within 2 weeks of d/c. Upon discharge will need to place orders for weekly labs with CBC with diff, BMP, and vanc trough to be faxed to Dr. Wagner Coyle for review - pain control with dilaudid - ID consulted, appreciate recs 
  
Atrial fibrillation with RVR 
- rate controlled, off Cardizem drip, continue with oral diltiazem 
- HR improved and < 100  
- restarted on warfarin-INR was therapeutic and then was held for the [facility] catheter placement for 2 consecutive nights will restart after the catheter placement pharmacy is dosing Hx of pacemaker lead vegetation and s/p removal of device 
-stable, continue cardiac monitoring 
  
PARAG on CKD stage III  
- creatinine starting to rise, now 1.67 from 1.65 yesterday--> 1.86--1.99  
- continue decreased bumex dosing - now at 0.5 BID from 2 mg daily 
-May need to reduce even more stop for a day and hold the Bumex 
- renally dose vanc 
  
T2DM, controlled: Episodes of hyperglycemia noted  
-continue long-acting, continue SSI. A1c 6.1, monitor finger stick glucose 
  
HTN  
- BP stable on hydralazine 
  
Depression 
-stable, continue buspirone, Zoloft Vaginal yeast infection  
-continue nystatin Code status: Full Code DVT prophylaxis: SCD, on coumadin, pharmacy to dose Care Plan discussed with: Patient/Family, Nurse and  Anticipated Disposition: TBD Anticipated Discharge: Next week after the home catheter placed to [facility]  
 
Hospital Problems  Date Reviewed: 8/24/2020 None Vital Signs:  
 Last 24hrs VS reviewed since prior progress note. Most recent are: 
Visit Vitals /66 (BP 1 Location: Left arm, BP Patient Position: At rest) Pulse 85 Temp 97.5 °F (36.4 °C) Resp 16 Ht 5' 10\" (1.778 m) Wt 102 kg (224 lb 13.9 oz) SpO2 98% BMI 32.27 kg/m² Intake/Output Summary (Last 24 hours) at 9/5/2020 1028 Last data filed at 9/5/2020 0060 Gross per 24 hour Intake 500 ml Output 1900 ml Net -1400 ml Physical Examination:  
 
Constitutional:  No acute distress, cooperative, pleasant ENT:  Oral mucosa moist, oropharynx benign. Resp:  Chest wall incision at R c/d/i, + drains, CTA bilaterally. No wheezing/rhonchi/rales. No accessory muscle use CV:  Regular rhythm, normal rate, no murmurs, gallops, rubs GI:  Soft, non distended, non tender. normoactive bowel sounds, no hepatosplenomegaly Musculoskeletal:  No edema, warm, Neurologic:  L BKA, R arm in sling. AAOx3, cranial nerves grossly normal, normal speech rate and rhythm Data Review:  
 Review and/or order of clinical lab test 
Review and/or order of tests in the radiology section of CPT Review and/or order of tests in the medicine section of CPT Labs:  
 
No results for input(s): WBC, HGB, HCT, PLT, HGBEXT, HCTEXT, PLTEXT, HGBEXT, HCTEXT, PLTEXT in the last 72 hours. Recent Labs  
  09/05/20 0405 09/04/20 0440 09/03/20 
0405  137 138  
K 3.9 4.3 3.8  105 105 CO2 26 26 25 BUN 23* 22* 20  
CREA 1.99* 1.86* 2.13* GLU 75 74 62* CA 8.7 8.4* 8.7 No results for input(s): ALT, AP, TBIL, TBILI, TP, ALB, GLOB, GGT, AML, LPSE in the last 72 hours. No lab exists for component: SGOT, GPT, AMYP, HLPSE Recent Labs  
  09/05/20 
0405 09/04/20 
0448 09/03/20 
0405 INR 2.5* 2.4* 1.7* PTP 24.2* 22.9* 17.3* Recent Labs  
  09/03/20 
1452 TIBC 255 PSAT 7* Lab Results Component Value Date/Time Folate 8.5 03/14/2020 02:49 PM  
  
No results for input(s): PH, PCO2, PO2 in the last 72 hours. No results for input(s): CPK, CKNDX, TROIQ in the last 72 hours. No lab exists for component: CPKMB Lab Results Component Value Date/Time Cholesterol, total 141 11/11/2019 08:47 AM  
 HDL Cholesterol 32 (L) 11/11/2019 08:47 AM  
 LDL, calculated 82 11/11/2019 08:47 AM  
 Triglyceride 137 11/11/2019 08:47 AM  
 
Lab Results Component Value Date/Time Glucose (POC) 134 (H) 09/05/2020 07:33 AM  
 Glucose (POC) 205 (H) 09/04/2020 09:43 PM  
 Glucose (POC) 100 09/04/2020 05:20 PM  
 Glucose (POC) 96 09/04/2020 11:08 AM  
 Glucose (POC) 119 (H) 09/04/2020 08:52 AM  
 
Lab Results Component Value Date/Time Color YELLOW/STRAW 05/23/2020 02:35 PM  
 Appearance CLOUDY (A) 05/23/2020 02:35 PM  
 Specific gravity 1.009 05/23/2020 02:35 PM  
 pH (UA) 6.0 05/23/2020 02:35 PM  
 Protein Negative 05/23/2020 02:35 PM  
 Glucose Negative 05/23/2020 02:35 PM  
 Ketone Negative 05/23/2020 02:35 PM  
 Bilirubin Negative 05/23/2020 02:35 PM  
 Urobilinogen 0.2 05/23/2020 02:35 PM  
 Nitrites Positive (A) 05/23/2020 02:35 PM  
 Leukocyte Esterase LARGE (A) 05/23/2020 02:35 PM  
 Epithelial cells FEW 05/23/2020 02:35 PM  
 Bacteria 4+ (A) 05/23/2020 02:35 PM  
 WBC >100 (H) 05/23/2020 02:35 PM  
 RBC 0-5 05/23/2020 02:35 PM  
 
 
 
Medications Reviewed:  
 
Current Facility-Administered Medications Medication Dose Route Frequency  Warfarin - please HOLD on 9/5 per MD.  Thanks! Other ONCE  Vancomycin trough - due 9/5 prior to the 1900 dose. Thanks! Other ONCE  
 diphenhydrAMINE (BENADRYL) capsule 25 mg  25 mg Oral Q6H PRN  
 [Held by provider] vancomycin (VANCOCIN) 1250 mg in  ml infusion  1,250 mg IntraVENous Q48H  Vancomycin- Pharmacy Dosing   Other Rx Dosing/Monitoring  potassium chloride SR (KLOR-CON 10) tablet 20 mEq  20 mEq Oral DAILY  bumetanide (BUMEX) tablet 0.5 mg  0.5 mg Oral BID  
 HYDROmorphone (DILAUDID) tablet 3 mg  3 mg Oral Q4H PRN  
 sodium chloride (NS) flush 5-40 mL  5-40 mL IntraVENous Q8H  
 sodium chloride (NS) flush 5-40 mL  5-40 mL IntraVENous PRN  
 naloxone (NARCAN) injection 0.4 mg  0.4 mg IntraVENous PRN  
 ondansetron (ZOFRAN) injection 4 mg  4 mg IntraVENous Q4H PRN  
 insulin glargine (LANTUS) injection 10 Units  10 Units SubCUTAneous QHS  Warfarin- Pharmacy Dosing   Other Rx Dosing/Monitoring  HYDROmorphone (PF) (DILAUDID) injection 0.5 mg  0.5 mg IntraVENous Q3H PRN  
 nystatin (MYCOSTATIN) 100,000 unit/gram cream   Topical BID  sodium chloride (NS) flush 5-40 mL  5-40 mL IntraVENous Q8H  
 sodium chloride (NS) flush 5-40 mL  5-40 mL IntraVENous PRN  
 naloxone (NARCAN) injection 0.1 mg  0.1 mg IntraVENous PRN  
 bisacodyL (DULCOLAX) tablet 10 mg  10 mg Oral DAILY  hydrALAZINE (APRESOLINE) 20 mg/mL injection 20 mg  20 mg IntraVENous Q6H PRN  
 hydrALAZINE (APRESOLINE) tablet 100 mg  100 mg Oral TID  busPIRone (BUSPAR) tablet 10 mg  10 mg Oral BID  dilTIAZem ER (CARDIZEM CD) capsule 180 mg  180 mg Oral DAILY  sertraline (ZOLOFT) tablet 75 mg  75 mg Oral DAILY  acetaminophen (TYLENOL) tablet 650 mg  650 mg Oral Q6H PRN Or  
 acetaminophen (TYLENOL) suppository 650 mg  650 mg Rectal Q6H PRN  polyethylene glycol (MIRALAX) packet 17 g  17 g Oral DAILY PRN  promethazine (PHENERGAN) tablet 12.5 mg  12.5 mg Oral Q6H PRN  
 glucose chewable tablet 16 g  4 Tab Oral PRN  
 glucagon (GLUCAGEN) injection 1 mg  1 mg IntraMUSCular PRN  
 dextrose 10% infusion 0-250 mL  0-250 mL IntraVENous PRN  
 gabapentin (NEURONTIN) capsule 100 mg  100 mg Oral TID PRN  
 insulin regular (NOVOLIN R, HUMULIN R) injection   SubCUTAneous AC&HS  
 
______________________________________________________________________ EXPECTED LENGTH OF STAY: 3d 17h ACTUAL LENGTH OF STAY:          16 Abby Basilio MD

## 2020-09-05 NOTE — PROGRESS NOTES
Problem: Falls - Risk of 
Goal: *Absence of Falls Description: Document Laura Villegas Fall Risk and appropriate interventions in the flowsheet. Outcome: Progressing Towards Goal 
Note: Fall Risk Interventions: 
Mobility Interventions: Assess mobility with egress test, Communicate number of staff needed for ambulation/transfer Medication Interventions: Patient to call before getting OOB Elimination Interventions: Call light in reach, Toileting schedule/hourly rounds

## 2020-09-05 NOTE — PROGRESS NOTES
Bedside and Verbal shift change report given to Mary Enciso (oncoming nurse) by Hema Cueto RN (offgoing nurse). Report included the following information SBAR, Kardex, Intake/Output, MAR, Recent Results and Cardiac Rhythm A Fib.

## 2020-09-05 NOTE — PROGRESS NOTES
Bedside and Verbal shift change report given to GURINDER Ervin (oncoming nurse) by Rubi Vogt (offgoing nurse). Report included the following information SBAR, Kardex, ED Summary, Procedure Summary, Intake/Output, MAR, Recent Results and Cardiac Rhythm A fib.

## 2020-09-06 NOTE — PROGRESS NOTES
[facility] [facility] Adult  Hospitalist Group Hospitalist Progress Note Ramon Closs, MD 
Answering service: [phone] OR [phone] from in house phone NAME:  Myrna Barrett :  1960 MRN:  [id number] Admission Summary: A 60year old female with past medical history HTN, DM Type II on insulin, Atrial fibrillation/flutter, CKD stage III, recent admission 2020-2020 for left foot cellulitis/OM s/p left BKA, bacteremia MSSA, endocarditis, Pacemaker vegetation s/p lead extraction, presenting to [facility] as a direct transfer from [address] [place] [address] emergency department for thoracic surgery evaluation.  Patient developed right-sided chest pain/tenderness radiating to right shoulder.  Seen at urgent care and found to have atrial fibrillation with RVR. Sent to the emergency department for further evaluation.  In the ED, CT chest demonstrated septic arthritis of the right sternal clavicular joint and no abscess.  Given Merrem and vancomycin and transferred to [facility] 2020 
  
Interval history / Subjective:  
 
Patient seen and examined at the bedside. No complaints ordered Simone catheter for the IV antibiotics but she could not get it yesterday due to INR 2.7  now Coumadin is held for 2 consecutive night to take it down to 2 pharmacy is dosing, d/w CM she will be transferred to [facility]  IV abx as per ID No other complains , vanc trophper pharmacy goal < 15   Labs reviewed creatinine 2 Assessment & Plan:  
 
Sepsis secondary to septic right sternoclavicular joint and abscess- resolved - Recent complicated hospitalization with MSSA bacteremia/endocarditis - Incision and debridement of right sternoclavicular joint.  Resection of medial third of right clavicle.  Placement of wound VAC on  
- OR cx: MSSA 
- continue IV vancomycin - plan is for 6 weeks until 10/5/2020.  Will need to see ID within 2 weeks of d/c. Upon discharge will need to place orders for weekly labs with CBC with diff, BMP, and vanc trough to be faxed to Dr. Ashley Murphy for review - pain control with dilaudid - ID consulted, appreciate recs 
  
Atrial fibrillation with RVR 
- rate controlled, off Cardizem drip, continue with oral diltiazem 
- HR improved and < 100  
- restarted on warfarin-INR was therapeutic and then was held for the [facility] catheter placement for 2 consecutive nights will restart after the catheter placement pharmacy is dosing Hx of pacemaker lead vegetation and s/p removal of device 
-stable, continue cardiac monitoring 
  
PARAG on CKD stage III  
- creatinine starting to rise, now 1.67 from 1.65 yesterday--> 1.86--1.99  
- continue decreased bumex dosing - now at 0.5 BID from 2 mg daily 
-May need to reduce even more stop for a day and hold the Bumex 
- renally dose vanc 
  
T2DM, controlled: Episodes of hyperglycemia noted  
-continue long-acting, continue SSI. A1c 6.1, monitor finger stick glucose 
  
HTN  
- BP stable on hydralazine 
  
Depression 
-stable, continue buspirone, Zoloft Vaginal yeast infection  
-continue nystatin Code status: Full Code DVT prophylaxis: SCD, on coumadin, pharmacy to dose Care Plan discussed with: Patient/Family, Nurse and  Anticipated Disposition: TBD Anticipated Discharge: Next week after the home catheter placed to [facility]  
 
Hospital Problems  Date Reviewed: 8/24/2020 None Vital Signs:  
 Last 24hrs VS reviewed since prior progress note. Most recent are: 
Visit Vitals /73 (BP 1 Location: Left arm, BP Patient Position: At rest) Pulse 81 Temp 98 °F (36.7 °C) Resp 18 Ht 5' 10\" (1.778 m) Wt 101.3 kg (223 lb 6.4 oz) SpO2 99% BMI 32.05 kg/m² Intake/Output Summary (Last 24 hours) at 9/6/2020 6237 Last data filed at 9/5/2020 1426 Gross per 24 hour Intake 720 ml Output 1000 ml Net -280 ml Physical Examination:  
 
Constitutional:  No acute distress, cooperative, pleasant ENT:  Oral mucosa moist, oropharynx benign. Resp:  Chest wall incision at R c/d/i, + drains, CTA bilaterally. No wheezing/rhonchi/rales. No accessory muscle use CV:  Regular rhythm, normal rate, no murmurs, gallops, rubs GI:  Soft, non distended, non tender. normoactive bowel sounds, no hepatosplenomegaly Musculoskeletal:  No edema, warm, Neurologic:  L BKA, R arm in sling. AAOx3, cranial nerves grossly normal, normal speech rate and rhythm Data Review:  
 Review and/or order of clinical lab test 
Review and/or order of tests in the radiology section of CPT Review and/or order of tests in the medicine section of CPT Labs:  
 
No results for input(s): WBC, HGB, HCT, PLT, HGBEXT, HCTEXT, PLTEXT, HGBEXT, HCTEXT, PLTEXT in the last 72 hours. Recent Labs  
  09/06/20 0047 09/05/20 0405 09/04/20 
0440  137 137  
K 4.1 3.9 4.3  104 105 CO2 26 26 26 BUN 25* 23* 22* CREA 2.06* 1.99* 1.86* GLU 99 75 74 CA 8.6 8.7 8.4* No results for input(s): ALT, AP, TBIL, TBILI, TP, ALB, GLOB, GGT, AML, LPSE in the last 72 hours. No lab exists for component: SGOT, GPT, AMYP, HLPSE Recent Labs  
  09/06/20 0047 09/05/20 0405 09/04/20 
0448 INR 2.7* 2.5* 2.4* PTP 26.0* 24.2* 22.9* Recent Labs  
  09/03/20 
1452 TIBC 255 PSAT 7* Lab Results Component Value Date/Time Folate 8.5 03/14/2020 02:49 PM  
  
No results for input(s): PH, PCO2, PO2 in the last 72 hours. No results for input(s): CPK, CKNDX, TROIQ in the last 72 hours. No lab exists for component: CPKMB Lab Results Component Value Date/Time Cholesterol, total 141 11/11/2019 08:47 AM  
 HDL Cholesterol 32 (L) 11/11/2019 08:47 AM  
 LDL, calculated 82 11/11/2019 08:47 AM  
 Triglyceride 137 11/11/2019 08:47 AM  
 
Lab Results Component Value Date/Time Glucose (POC) 72 09/06/2020 07:58 AM  
 Glucose (POC) 193 (H) 09/05/2020 09:37 PM  
 Glucose (POC) 100 09/05/2020 04:40 PM  
 Glucose (POC) 97 09/05/2020 11:11 AM  
 Glucose (POC) 134 (H) 09/05/2020 07:33 AM  
 
Lab Results Component Value Date/Time Color YELLOW/STRAW 05/23/2020 02:35 PM  
 Appearance CLOUDY (A) 05/23/2020 02:35 PM  
 Specific gravity 1.009 05/23/2020 02:35 PM  
 pH (UA) 6.0 05/23/2020 02:35 PM  
 Protein Negative 05/23/2020 02:35 PM  
 Glucose Negative 05/23/2020 02:35 PM  
 Ketone Negative 05/23/2020 02:35 PM  
 Bilirubin Negative 05/23/2020 02:35 PM  
 Urobilinogen 0.2 05/23/2020 02:35 PM  
 Nitrites Positive (A) 05/23/2020 02:35 PM  
 Leukocyte Esterase LARGE (A) 05/23/2020 02:35 PM  
 Epithelial cells FEW 05/23/2020 02:35 PM  
 Bacteria 4+ (A) 05/23/2020 02:35 PM  
 WBC >100 (H) 05/23/2020 02:35 PM  
 RBC 0-5 05/23/2020 02:35 PM  
 
 
 
Medications Reviewed:  
 
Current Facility-Administered Medications Medication Dose Route Frequency  diphenhydrAMINE (BENADRYL) capsule 25 mg  25 mg Oral Q6H PRN  
 vancomycin (VANCOCIN) 1250 mg in  ml infusion  1,250 mg IntraVENous Q48H  Vancomycin- Pharmacy Dosing   Other Rx Dosing/Monitoring  potassium chloride SR (KLOR-CON 10) tablet 20 mEq  20 mEq Oral DAILY  [Held by provider] bumetanide (BUMEX) tablet 0.5 mg  0.5 mg Oral BID  
 HYDROmorphone (DILAUDID) tablet 3 mg  3 mg Oral Q4H PRN  
 sodium chloride (NS) flush 5-40 mL  5-40 mL IntraVENous Q8H  
 sodium chloride (NS) flush 5-40 mL  5-40 mL IntraVENous PRN  
 naloxone (NARCAN) injection 0.4 mg  0.4 mg IntraVENous PRN  
 ondansetron (ZOFRAN) injection 4 mg  4 mg IntraVENous Q4H PRN  
 insulin glargine (LANTUS) injection 10 Units  10 Units SubCUTAneous QHS  Warfarin- Pharmacy Dosing   Other Rx Dosing/Monitoring  HYDROmorphone (PF) (DILAUDID) injection 0.5 mg  0.5 mg IntraVENous Q3H PRN  
 nystatin (MYCOSTATIN) 100,000 unit/gram cream   Topical BID  
  sodium chloride (NS) flush 5-40 mL  5-40 mL IntraVENous Q8H  
 sodium chloride (NS) flush 5-40 mL  5-40 mL IntraVENous PRN  
 naloxone (NARCAN) injection 0.1 mg  0.1 mg IntraVENous PRN  
 bisacodyL (DULCOLAX) tablet 10 mg  10 mg Oral DAILY  hydrALAZINE (APRESOLINE) 20 mg/mL injection 20 mg  20 mg IntraVENous Q6H PRN  
 hydrALAZINE (APRESOLINE) tablet 100 mg  100 mg Oral TID  busPIRone (BUSPAR) tablet 10 mg  10 mg Oral BID  dilTIAZem ER (CARDIZEM CD) capsule 180 mg  180 mg Oral DAILY  sertraline (ZOLOFT) tablet 75 mg  75 mg Oral DAILY  acetaminophen (TYLENOL) tablet 650 mg  650 mg Oral Q6H PRN Or  
 acetaminophen (TYLENOL) suppository 650 mg  650 mg Rectal Q6H PRN  polyethylene glycol (MIRALAX) packet 17 g  17 g Oral DAILY PRN  promethazine (PHENERGAN) tablet 12.5 mg  12.5 mg Oral Q6H PRN  
 glucose chewable tablet 16 g  4 Tab Oral PRN  
 glucagon (GLUCAGEN) injection 1 mg  1 mg IntraMUSCular PRN  
 dextrose 10% infusion 0-250 mL  0-250 mL IntraVENous PRN  
 gabapentin (NEURONTIN) capsule 100 mg  100 mg Oral TID PRN  
 insulin regular (NOVOLIN R, HUMULIN R) injection   SubCUTAneous AC&HS  
 
______________________________________________________________________ EXPECTED LENGTH OF STAY: 3d 17h ACTUAL LENGTH OF STAY:          Paige Malave MD

## 2020-09-06 NOTE — PROGRESS NOTES
Bedside and Verbal shift change report given to Robert Aguillon (oncoming nurse) by Eliodoro Merlin (offgoing nurse). Report included the following information SBAR, Kardex, ED Summary, Procedure Summary, Intake/Output, MAR, Recent Results and Cardiac Rhythm A fib.

## 2020-09-06 NOTE — PROGRESS NOTES
Pharmacist Note  Warfarin Dosing Consult provided for this 60 y. o.female to manage warfarin for Atrial Fibrillation INR Goal: 2 - 3 Home regimen/ tablet size: 2 mg PO daily Drugs that may increase INR: None Drugs that may decrease INR: None Other current anticoagulants/ drugs that may increase bleeding risk: Sertraline Risk factors: None Daily INR ordered: YES Recent Labs  
  09/06/20 
0047 09/05/20 
0405 09/04/20 
0448 INR 2.7* 2.5* 2.4* Date               INR                  Dose 8/27  --  3 mg  
8/28                1.1                   3 mg 
8/29                1.1                   3 mg 
8/30                1.1                   3 mg 
8/31                1.1                   6 mg 
9/1                  1.3                   5 mg 
9/2                  1.4                   5 mg 
9/3                  1.7                   5 mg 
9/4                  2.4                   Held by MD 
9/5                  2.5                   Held by MD 
9/6                  2.7                   Held by MD 
                                                                            
Assessment/ Plan: 
Continue to hold the warfarin today per Dr. Lucy Wright in preparation for [facility] catheter placement on Monday. Unclear why INR has been increasing despite warfarin being held since 9/4/20. Pharmacy will continue to monitor daily and adjust therapy as indicated. Please contact the pharmacist at  for outpatient recommendations if needed.

## 2020-09-06 NOTE — PROGRESS NOTES
Infectious Disease Progress Note Subjective: Ms Edith Baptiste seen earlier. INR still up for catheter and awaiting catheter placement Says pain better in incisional area, says hurt more in are where her drain was and removed ROS: 10 point ROS obtained and pertinent positives include above. All others negative. Objective: 
 
Vitals:  
Patient Vitals for the past 24 hrs: 
 Temp Pulse Resp BP SpO2  
09/06/20 1146 97.8 °F (36.6 °C) 99 18 144/78 98 % 09/06/20 0801 98 °F (36.7 °C) 81 18 155/73 99 % 09/06/20 0714 98 °F (36.7 °C) 72 18 160/73 97 % 09/06/20 0315 98.2 °F (36.8 °C) 60 16 144/72 98 % 09/05/20 2304 97.5 °F (36.4 °C) 78 18 124/78 97 % 09/05/20 2000     99 % 09/05/20 1957 97.7 °F (36.5 °C) 78 16 134/65 99 % 09/05/20 1501 98.2 °F (36.8 °C) 78 16 149/63 98 % Physical Exam: 
Gen: No apparent distress HEENT:   no scleral icterus, no thrush, CV: S1,2 heard regularly, no lower extremity edema  Drains removed, no jacinta erythema around surgical site , no  discharge seen Lungs: Clear to auscultation bilaterally, Abdomen: soft, non tender,  
Skin: no rash , RLE chronic venous changes to skin Musculoskeletal:  No joint edema, erythema or tenderness noted RLE, + LLE BKA Medications: 
 
Current Facility-Administered Medications:  
  Warfarin - please HOLD on 9/6 per MD.  Thanks!, , Other, Kira Grullon MD 
  diphenhydrAMINE (BENADRYL) capsule 25 mg, 25 mg, Oral, Q6H PRN, Christiano Hammonds MD, 25 mg at 09/05/20 2206   vancomycin (VANCOCIN) 1250 mg in  ml infusion, 1,250 mg, IntraVENous, Q48H, Christiano Hammonds MD 
  Vancomycin- Pharmacy Dosing, , Other, Rx Dosing/Monitoring, Jaye Gruber,  
  potassium chloride SR (KLOR-CON 10) tablet 20 mEq, 20 mEq, Oral, DAILY, Kayla Metz MD, 20 mEq at 09/06/20 1525   [Held by provider] bumetanide (BUMEX) tablet 0.5 mg, 0.5 mg, Oral, BID, Kayla Metz MD, 0.5 mg at 09/05/20 0770   HYDROmorphone (DILAUDID) tablet 3 mg, 3 mg, Oral, Q4H PRN, Sabina Dolan MD, 3 mg at 09/04/20 1108   sodium chloride (NS) flush 5-40 mL, 5-40 mL, IntraVENous, Q8H, Jessica Quintanilla MD, 10 mL at 09/06/20 6899   sodium chloride (NS) flush 5-40 mL, 5-40 mL, IntraVENous, PRN, Jessica Quintanilla MD 
  naloxone [facility]) injection 0.4 mg, 0.4 mg, IntraVENous, PRN, Jessica Quintanilla MD 
  ondansetron [facility]) injection 4 mg, 4 mg, IntraVENous, Q4H PRN, Jessica Quintanilla MD 
  insulin glargine (LANTUS) injection 10 Units, 10 Units, SubCUTAneous, QHS, Sabina Dolan MD, 10 Units at 09/05/20 2206   Warfarin- Pharmacy Dosing, , Other, Rx Dosing/Monitoring, Sabina Dolan MD 
  HYDROmorphone (PF) (DILAUDID) injection 0.5 mg, 0.5 mg, IntraVENous, Q3H PRN, Sabina Dolan MD, 0.5 mg at 09/06/20 1026   nystatin (MYCOSTATIN) 100,000 unit/gram cream, , Topical, BID, Jacinda Lowe MD 
  sodium chloride (NS) flush 5-40 mL, 5-40 mL, IntraVENous, Q8H, HussainYanna rabago Ala, 10 mL at 09/06/20 0228   sodium chloride (NS) flush 5-40 mL, 5-40 mL, IntraVENous, PRN, LORNA Nixon Ala, 10 mL at 09/06/20 1027 
  naloxone (NARCAN) injection 0.1 mg, 0.1 mg, IntraVENous, PRN, LORNA Nixon Ala 
  bisacodyL (DULCOLAX) tablet 10 mg, 10 mg, Oral, DAILY, HussainLORNA rabago Ala, 10 mg at 08/31/20 2092   hydrALAZINE (APRESOLINE) 20 mg/mL injection 20 mg, 20 mg, IntraVENous, Q6H PRN, Domingo Padilla MD, 20 mg at 08/28/20 8142   hydrALAZINE (APRESOLINE) tablet 100 mg, 100 mg, Oral, TID, Domingo Padilla MD, 100 mg at 09/06/20 8131   busPIRone (BUSPAR) tablet 10 mg, 10 mg, Oral, BID, Jessica Quintanilla MD, 10 mg at 09/06/20 0910 
  dilTIAZem ER (CARDIZEM CD) capsule 180 mg, 180 mg, Oral, DAILY, Jessica Quintanilla MD, 180 mg at 09/06/20 3122   sertraline (ZOLOFT) tablet 75 mg, 75 mg, Oral, DAILY, Jessica Quintanilla MD, 75 mg at 09/06/20 9729   acetaminophen (TYLENOL) tablet 650 mg, 650 mg, Oral, Q6H PRN, 650 mg at 08/21/20 1602 **OR** acetaminophen (TYLENOL) suppository 650 mg, 650 mg, Rectal, Q6H PRN, Delano Woods MD 
  polyethylene glycol (MIRALAX) packet 17 g, 17 g, Oral, DAILY PRN, Delano Woods MD 
  promethazine (PHENERGAN) tablet 12.5 mg, 12.5 mg, Oral, Q6H PRN **OR** [DISCONTINUED] ondansetron (ZOFRAN) injection 4 mg, 4 mg, IntraVENous, Q6H PRN, Delano Woods MD 
  glucose chewable tablet 16 g, 4 Tab, Oral, PRN, Luisana Daugherty Jaylene M, DO 
  glucagon (GLUCAGEN) injection 1 mg, 1 mg, IntraMUSCular, PRN, Luisana Daugherty Jaylene M, DO 
  dextrose 10% infusion 0-250 mL, 0-250 mL, IntraVENous, PRN, Luisana Daugherty Mac Screen M, DO 
  gabapentin (NEURONTIN) capsule 100 mg, 100 mg, Oral, TID PRN, Tillman Peabody M, DO 
  insulin regular (NOVOLIN R, HUMULIN R) injection, , SubCUTAneous, AC&HS, Tillman Peabody M, DO, Stopped at 09/05/20 0730 Labs: 
Recent Results (from the past 24 hour(s)) GLUCOSE, POC Collection Time: 09/05/20  4:40 PM  
Result Value Ref Range Glucose (POC) 100 65 - 100 mg/dL Performed by Derek Sharif Collection Time: 09/05/20  6:21 PM  
Result Value Ref Range Vancomycin,trough 15.0 (H) 5.0 - 10.0 ug/mL Reported dose date: NOT PROVIDED Reported dose time: NOT PROVIDED Reported dose: NOT PROVIDED UNITS  
GLUCOSE, POC Collection Time: 09/05/20  9:37 PM  
Result Value Ref Range Glucose (POC) 193 (H) 65 - 100 mg/dL Performed by Cortney Kimball PROTHROMBIN TIME + INR Collection Time: 09/06/20 12:47 AM  
Result Value Ref Range INR 2.7 (H) 0.9 - 1.1 Prothrombin time 26.0 (H) 9.0 - 11.1 sec METABOLIC PANEL, BASIC Collection Time: 09/06/20 12:47 AM  
Result Value Ref Range Sodium 136 136 - 145 mmol/L Potassium 4.1 3.5 - 5.1 mmol/L Chloride 105 97 - 108 mmol/L  
 CO2 26 21 - 32 mmol/L Anion gap 5 5 - 15 mmol/L Glucose 99 65 - 100 mg/dL BUN 25 (H) 6 - 20 MG/DL Creatinine 2.06 (H) 0.55 - 1.02 MG/DL  
 BUN/Creatinine ratio 12 12 - 20 GFR est AA 30 (L) >60 ml/min/1.73m2 GFR est non-AA 25 (L) >60 ml/min/1.73m2 Calcium 8.6 8.5 - 10.1 MG/DL  
GLUCOSE, POC Collection Time: 09/06/20  7:58 AM  
Result Value Ref Range Glucose (POC) 72 65 - 100 mg/dL Performed by [employer] GLUCOSE, POC Collection Time: 09/06/20 11:47 AM  
Result Value Ref Range Glucose (POC) 111 (H) 65 - 100 mg/dL Performed by [employer]:  
 
Wound 8/25/20 Specimen Information:  Tissue STERNUM   
     
Component  Value  Ref Range & Units  Status Special Requests:  MANUBRIUM   Final   
GRAM STAIN  RARE WBCS SEEN     Final   
GRAM STAIN  NO ORGANISMS SEEN     Final   
Culture result: Abnormal        Final   
LIGHT STAPHYLOCOCCUS AUREUS Susceptibility Staphylococcus aureus BRITTON Ciprofloxacin ($)  <=0.5 ug/mL  S Clindamycin ($)  0.25 ug/mL  S Daptomycin ($$$$$)  0.25 ug/mL  S Doxycycline ($$)  <=0.5 ug/mL  S Erythromycin ($$$$)  <=0.25 ug/mL  S Gentamicin ($)  <=0.5 ug/mL  S Levofloxacin ($)  <=0.12 ug/mL  S Linezolid ($$$$$)  2 ug/mL  S Moxifloxacin ($$$$)  <=0.25 ug/mL  S Oxacillin  <=0.25 ug/mL  S Rifampin ($$$$)  <=0.5 ug/mL  S1 Tetracycline  <=1 ug/mL  S Trimeth/Sulfa  <=10 ug/mL  S Vancomycin ($)  1 ug/mL  S Blood: 8/20/20 Specimen Information:  Blood   
     
Component  Value  Ref Range & Units  Status Special Requests:  NO SPECIAL REQUESTS     Preliminary Culture result:  NO GROWTH 4 DAYS Wound Sternoclavicular joint Tissue RIGHT STERNOCLAVICULAR JOINT A   
     
Component  Value  Ref Range & Units  Status Special Requests:  NO SPECIAL REQUESTS     Final   
GRAM STAIN  NO WBC'S SEEN     Final   
GRAM STAIN  NO ORGANISMS SEEN     Final   
Culture result: Abnormal        Final   
 HEAVY STAPHYLOCOCCUS AUREUS Susceptibility Staphylococcus aureus BRITTON Ciprofloxacin ($)  <=0.5 ug/mL  S Clindamycin ($)  0.25 ug/mL  S Daptomycin ($$$$$)  0.25 ug/mL  S Doxycycline ($$)  <=0.5 ug/mL  S Erythromycin ($$$$)  <=0.25 ug/mL  S Gentamicin ($)  <=0.5 ug/mL  S Levofloxacin ($)  <=0.12 ug/mL  S Linezolid ($$$$$)  2 ug/mL  S Moxifloxacin ($$$$)  <=0.25 ug/mL  S Oxacillin  <=0.25 ug/mL  S Rifampin ($$$$)  <=0.5 ug/mL  S1 Tetracycline  <=1 ug/mL  S Trimeth/Sulfa  <=10 ug/mL  S Vancomycin ($)  1 ug/mL  S Component  Value  Ref Range & Units  Status Special Requests:    Final   
RIGHT STERNOCLAVICULAR JOINT ABSCESS Culture result:  NO ANAEROBES ISOLATED     Final   
Result History Tissue RIGHT STERNOCLAVICULAR JOINT A   
     
Component  Value  Ref Range & Units  Status Special Requests:  NO SPECIAL REQUESTS     Final   
GRAM STAIN  NO WBC'S SEEN     Final   
GRAM STAIN  NO ORGANISMS SEEN     Final   
Culture result: Abnormal        Final   
HEAVY STAPHYLOCOCCUS AUREUS Susceptibility Staphylococcus aureus BRITTON Ciprofloxacin ($)  <=0.5 ug/mL  S Clindamycin ($)  0.25 ug/mL  S Daptomycin ($$$$$)  0.25 ug/mL  S Doxycycline ($$)  <=0.5 ug/mL  S Erythromycin ($$$$)  <=0.25 ug/mL  S Gentamicin ($)  <=0.5 ug/mL  S Levofloxacin ($)  <=0.12 ug/mL  S Linezolid ($$$$$)  2 ug/mL  S Moxifloxacin ($$$$)  <=0.25 ug/mL  S Oxacillin  <=0.25 ug/mL  S Rifampin ($$$$)  <=0.5 ug/mL  S1 Tetracycline  <=1 ug/mL  S Trimeth/Sulfa  <=10 ug/mL  S Vancomycin ($)  1 ug/mL  S Component  Value  Ref Range & Units  Status Special Requests:    Final   
RIGHT STERNOCLAVICULAR JOINT ABSCESS Culture result:  NO ANAEROBES ISOLATED     Final   
Result History CULTURE, ANAEROBIC on 8/22/2020 Imaging: 
CT chest 8/19/20 
  
FINDINGS: 
  
 CHEST WALL: No mass or axillary lymphadenopathy. THYROID: No nodule. MEDIASTINUM: No mass or lymphadenopathy. ASHLYN: No mass or lymphadenopathy. THORACIC AORTA: No aneurysm. MAIN PULMONARY ARTERY: Normal in caliber. TRACHEA/BRONCHI: Patent. ESOPHAGUS: No wall thickening or dilatation. HEART: Coronary artery calcifications are present. PLEURA: No effusion or pneumothorax. LUNGS: No nodule, mass, or airspace disease. INCIDENTALLY IMAGED UPPER ABDOMEN: There is a right adrenal adenoma measuring 2.8 cm. BONES: There is inflammation involving the right sternoclavicular joint. There 
is no focal fluid collection. 
  
IMPRESSION IMPRESSION: 
Suspect septic arthritis of the right sternoclavicular joint. No drainable fluid Collection. TTE 8/20/2020 · LV: Estimated LVEF is 55 - 60%. Visually measured ejection fraction. Normal cavity size and systolic function (ejection fraction normal). Mild concentric hypertrophy. Wall motion: normal. Normal left ventricular strain. · LA: Moderately dilated left atrium. · AV: Aortic valve leaflet calcification present without reduced excursion. · MV: Mitral valve non-specific thickening. Echo Findings Left Ventricle  Normal cavity size and systolic function (ejection fraction normal). Mild concentric hypertrophy. Wall motion: normal. The estimated EF is 55 - 60%. Visually measured ejection fraction. Normal left ventricular strain. Left Atrium  Moderately dilated left atrium. Right Ventricle  Normal cavity size and global systolic function. Right Atrium  Normal cavity size. Aortic Valve  No stenosis and no regurgitation. There is leaflet calcification without reduced excursion. Mitral Valve  No stenosis. Mitral valve non-specific thickening. Trace regurgitation. Tricuspid Valve  Normal valve structure, no stenosis and no regurgitation. Pulmonic Valve  Pulmonic valve not well visualized. Aorta  Normal aortic root. Pericardium  No evidence of pericardial effusion. Assessment / Plan Ms. Delmi Isidro is a 79-year-old lady with a history of diabetes, hypertension, A. 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 
who was transferred from [facility] for right sternoclavicular joint osteomyelitis//septic joint. She underwent incision and debridement of the right sternoclavicular joint, resection of the middle third of the right clavicle and placement of wound VAC on 8/20/2020. Operative findings include \" jacinta purulence within the [place] joint which was eroded\" Her blood culture on 8/20/2020 was negative but wound cultures from [place] joint debridement were positive for MSSA She is currently on IV vancomycin She reports a history of myalgias and muscle issues while on daptomycin previously She lists an allergy to antibiotics including Bactrim as well as penicillin. She had hives with penicillin. Cephalosporin was attempted and she says she passed out almost and had no idea what was happening to her when she received cefepime in the past. 
 
1) sternoclavicular joint abscess, osteomyelitis with cx + for MSSA History of MSSA bacteremia, pacer vegetation status post removal and endocarditis treatment in April to May 2020 Left foot osteomyelitis status post amputation/BKA May 2020 I discussed with the patient that vancomycin is  not the best medication for MSSA. However given her issues with antibiotics in the past to cephalosporins, penicillin and daptomycin,  we will continue with IV vancomycin as choices are limited if renal function allows Recommendations IV Vancomycin till 10/5/20 Vancomycin dosing per levels given renal issues by pharmacy Hold vancomycin for trough > 15  
 Long term zyvox use is concerning for bone marrow suppression and also interactions with her other medications such as zyvox ( can cause qt prolongation) Overall, compliacted scenario with limited antibiotic choice ( allergies, intoleraces in past to penicillin, cephalosporins and daptomycin per patient ) She will need weekly labs for CBC with differential and BMP, vancomycin trough, ESR and CRP 
follow-up with me within 2 weeks of discharge Antibiotic side effects/adverse effects/toxicities discussed including gastrointestinal, renal, hematological , ability to lower siezure threshold, risk for C diff infection. Probiotics, daily yogurt encouraged. Antibiotic side effects/adverse effects/toxicities discussed including gastrointestinal, renal, hematological , ability to lower siezure threshold, risk for C diff infection. Probiotics, daily yogurt encouraged. 2) A. fib 3) PARAG Renally dose vancomycin 4) DVT prophylaxis 5) screening hep C antibody negative in March 2020, HIV NR 
 
D/W patient's  and pharmacist today Thank you for the opportunity to participate in the care of this patient. Please contact with questions or concerns.    
 
Jaye Gruber DO 
12:30 PM

## 2020-09-06 NOTE — PROGRESS NOTES
Bedside and Verbal shift change report given to [address]ning (oncoming nurse) by Leticia Pressley (offgoing nurse). Report included the following information SBAR, Kardex, MAR, Recent Results and Cardiac Rhythm NSR. Patient Vitals for the past 12 hrs: 
 Temp Pulse Resp BP SpO2  
09/06/20 0714 98 °F (36.7 °C) 72 18 160/73 97 % 09/06/20 0315 98.2 °F (36.8 °C) 60 16 144/72 98 % 09/05/20 2304 97.5 °F (36.4 °C) 78 18 124/78 97 % 09/05/20 2000     99 % 09/05/20 1957 97.7 °F (36.5 °C) 78 16 134/65 99 % Problem: Falls - Risk of 
Goal: *Absence of Falls Description: Document Devere Tibbie Fall Risk and appropriate interventions in the flowsheet. Outcome: Progressing Towards Goal 
Note: Fall Risk Interventions: 
Mobility Interventions: Assess mobility with egress test, OT consult for ADLs, PT Consult for mobility concerns, PT Consult for assist device competence, Patient to call before getting OOB Medication Interventions: Patient to call before getting OOB, Evaluate medications/consider consulting pharmacy Elimination Interventions: Call light in reach Problem: Pain Goal: *Control of Pain Outcome: Progressing Towards Goal 
  
Problem: Pressure Injury - Risk of 
Goal: *Prevention of pressure injury Description: Document Valdemar Scale and appropriate interventions in the flowsheet. Outcome: Progressing Towards Goal 
Note: Pressure Injury Interventions: 
Sensory Interventions: Assess changes in LOC, Assess need for specialty bed, Discuss PT/OT consult with provider, Minimize linen layers Moisture Interventions: Absorbent underpads, Apply protective barrier, creams and emollients, Check for incontinence Q2 hours and as needed Activity Interventions: PT/OT evaluation Mobility Interventions: Assess need for specialty bed, PT/OT evaluation Nutrition Interventions: Document food/fluid/supplement intake Friction and Shear Interventions: Apply protective barrier, creams and emollients, Minimize layers

## 2020-09-06 NOTE — PROGRESS NOTES
Problem: Pressure Injury - Risk of 
Goal: *Prevention of pressure injury Description: Document Valdemar Scale and appropriate interventions in the flowsheet. Outcome: Progressing Towards Goal 
Note: Pressure Injury Interventions: 
Sensory Interventions: Assess changes in LOC Moisture Interventions: Absorbent underpads, Internal/External urinary devices Activity Interventions: PT/OT evaluation Mobility Interventions: PT/OT evaluation Nutrition Interventions: Document food/fluid/supplement intake Friction and Shear Interventions: Apply protective barrier, creams and emollients, Minimize layers

## 2020-09-07 NOTE — PROGRESS NOTES
Pharmacist Note  Warfarin Dosing Consult provided for this [address] [facility] y. o.female to manage warfarin for Atrial Fibrillation INR Goal: 2 - 3 Home regimen/ tablet size: 2 mg PO daily Drugs that may increase INR: None Drugs that may decrease INR: None Other current anticoagulants/ drugs that may increase bleeding risk: Sertraline Risk factors: None Daily INR ordered: YES Recent Labs  
  09/07/20 
0245 09/06/20 
0047 09/05/20 
0405 INR 2.3* 2.7* 2.5* Date               INR                  Dose 8/27  --  3 mg  
8/28                1.1                   3 mg 
8/29                1.1                   3 mg 
8/30                1.1                   3 mg 
8/31                1.1                   6 mg 
9/1                  1.3                   5 mg 
9/2                  1.4                   5 mg 
9/3                  1.7                   5 mg 
9/4                  2.4                   Held by MD 
9/5                  2.5                   Held by MD 
9/6                  2.7                   Held by MD 
9/7                  2.3                   Hold Assessment/ Plan: 
Hold warfarin today. For Simone catheter placement today. Pharmacy will continue to monitor daily and adjust therapy as indicated. Please contact the pharmacist at  for outpatient recommendations if needed.

## 2020-09-07 NOTE — PROGRESS NOTES
Day #19 of Vancomycin Indication:  Suspected infected sternoclavicular joint; recent complicated hospitalization with MSSA bacteremia/endocarditis; s/p Incision and debridement of right sternoclavicular joint; resection of medial third of right clavicle; placement of wound VAC; wound cxs here growing MSSA but patient has penicillin allergy and refusing cephalosporins Current regimen:  1250 mg IV Q 48 hours Recent Labs  
  20 
0245 20 
0047 20 
0405 CREA 1.97* 2.06* 1.99* BUN 26* 25* 23* Est CrCl: ~35-40 ml/min; UO: ~0.7 mL/kg/hr Temp (24hrs), Av.9 °F (36.6 °C), Min:97.8 °F (36.6 °C), Max:98.1 °F (36.7 °C) Cultures:  
 Blood-NG, final 
 Wound (Right sternoclavicular joint) x2 - heavy MSSA, final 
 Sternum tissue cx- light MSSA, final 
 
Goal trough = ~15 mcg/mL Plan: Dose not charted as given on . Will give 1500 mg IV now, then resume 1250 mg IV q48h.

## 2020-09-07 NOTE — PROGRESS NOTES
Bedside and Verbal shift change report given to Robert Aguillon (oncoming nurse) by Roxanne Simpson (offgoing nurse). Report included the following information SBAR, Kardex, ED Summary, Procedure Summary, Intake/Output, MAR, Recent Results and Cardiac Rhythm A fib.

## 2020-09-07 NOTE — PROGRESS NOTES
Problem: Pressure Injury - Risk of 
Goal: *Prevention of pressure injury Description: Document Valdemar Scale and appropriate interventions in the flowsheet. Outcome: Progressing Towards Goal 
Note: Pressure Injury Interventions: 
Sensory Interventions: Assess changes in LOC Moisture Interventions: Apply protective barrier, creams and emollients, Internal/External urinary devices Activity Interventions: PT/OT evaluation, Increase time out of bed Mobility Interventions: Assess need for specialty bed Nutrition Interventions: Document food/fluid/supplement intake Friction and Shear Interventions: Apply protective barrier, creams and emollients

## 2020-09-07 NOTE — PROGRESS NOTES
[address] [facility] Adult  Hospitalist Group Hospitalist Progress Note Bi Garner MD 
Answering service: [phone] OR [phone] from in house phone NAME:  Yajaira Swanson :  1960 MRN:  [id number] Admission Summary: A 60year old female with past medical history HTN, DM Type II on insulin, Atrial fibrillation/flutter, CKD stage III, recent admission 2020-2020 for left foot cellulitis/OM s/p left BKA, bacteremia MSSA, endocarditis, Pacemaker vegetation s/p lead extraction, presenting to [address] [place] [address] as a direct transfer from [address] [place] [address] emergency department for thoracic surgery evaluation.  Patient developed right-sided chest pain/tenderness radiating to right shoulder.  Seen at urgent care and found to have atrial fibrillation with RVR. Sent to the emergency department for further evaluation.  In the ED, CT chest demonstrated septic arthritis of the right sternal clavicular joint and no abscess.  Given Merrem and vancomycin and transferred to [address] [place] [address] 2020 
  
Interval history / Subjective:  
 
Patient seen and examined at the bedside. No complaints ordered Simone catheter for the IV antibiotics but  INR 2.3  now Coumadin is held No other complains , vanc trophper pharmacy goal < 15   Labs reviewed creatinine 2 no other issues pt doing well awaiting transfer to [facility] Assessment & Plan:  
 
Sepsis secondary to septic right sternoclavicular joint and abscess- resolved - Recent complicated hospitalization with MSSA bacteremia/endocarditis - Incision and debridement of right sternoclavicular joint.  Resection of medial third of right clavicle.  Placement of wound VAC on  
- OR cx: MSSA 
- continue IV vancomycin - plan is for 6 weeks until 10/5/2020. Will need to see ID within 2 weeks of d/c.  Upon discharge will need to place orders for weekly labs with CBC with diff, BMP, and vanc trough to be faxed to Dr. Alana Funes for review - pain control with dilaudid - ID consulted, appreciate recs 
  
Atrial fibrillation with RVR 
- rate controlled, off Cardizem drip, continue with oral diltiazem 
- HR improved and < 100  
- restarted on warfarin-INR was therapeutic and then was held for the [facility] catheter placement for 2 consecutive nights will restart after the catheter placement pharmacy is dosing Hx of pacemaker lead vegetation and s/p removal of device 
-stable, continue cardiac monitoring 
  
PARAG on CKD stage III  
- creatinine starting to rise, now 1.67 from 1.65 yesterday--> 1.86--1.99  
- continue decreased bumex dosing - now at 0.5 BID from 2 mg daily 
-May need to reduce even more stop for a day and hold the Bumex 
- renally dose vanc 
  
T2DM, controlled: Episodes of hyperglycemia noted  
-continue long-acting, continue SSI. A1c 6.1, monitor finger stick glucose 
  
HTN  
- BP stable on hydralazine 
  
Depression 
-stable, continue buspirone, Zoloft Vaginal yeast infection  
-continue nystatin Code status: Full Code DVT prophylaxis: SCD, on coumadin, pharmacy to dose Care Plan discussed with: Patient/Family, Nurse and  Anticipated Disposition: TBD Anticipated Discharge: Next week after the home catheter placed to [facility]  
 
Hospital Problems  Date Reviewed: 8/24/2020 None Vital Signs:  
 Last 24hrs VS reviewed since prior progress note. Most recent are: 
Visit Vitals /67 Pulse 100 Temp 98.2 °F (36.8 °C) Resp 16 Ht 5' 10\" (1.778 m) Wt 101.2 kg (223 lb 1.7 oz) SpO2 98% BMI 32.01 kg/m² Intake/Output Summary (Last 24 hours) at 9/7/2020 1036 Last data filed at 9/7/2020 8116 Gross per 24 hour Intake  Output 700 ml Net -700 ml Physical Examination:  
 
Constitutional:  No acute distress, cooperative, pleasant ENT:  Oral mucosa moist, oropharynx benign. Resp:  Chest wall incision at R c/d/i, + drains, CTA bilaterally. No wheezing/rhonchi/rales. No accessory muscle use CV:  Regular rhythm, normal rate, no murmurs, gallops, rubs GI:  Soft, non distended, non tender. normoactive bowel sounds, no hepatosplenomegaly Musculoskeletal:  No edema, warm, Neurologic:  L BKA, R arm in sling. AAOx3, cranial nerves grossly normal, normal speech rate and rhythm Data Review:  
 Review and/or order of clinical lab test 
Review and/or order of tests in the radiology section of CPT Review and/or order of tests in the medicine section of CPT Labs:  
 
No results for input(s): WBC, HGB, HCT, PLT, HGBEXT, HCTEXT, PLTEXT, HGBEXT, HCTEXT, PLTEXT in the last 72 hours. Recent Labs  
  09/07/20 0245 09/06/20 0047 09/05/20 
0405  136 137  
K 4.4 4.1 3.9  105 104 CO2 25 26 26 BUN 26* 25* 23* CREA 1.97* 2.06* 1.99* GLU 87 99 75  
CA 8.6 8.6 8.7 No results for input(s): ALT, AP, TBIL, TBILI, TP, ALB, GLOB, GGT, AML, LPSE in the last 72 hours. No lab exists for component: SGOT, GPT, AMYP, HLPSE Recent Labs  
  09/07/20 0245 09/06/20 0047 09/05/20 
0405 INR 2.3* 2.7* 2.5* PTP 22.6* 26.0* 24.2* No results for input(s): FE, TIBC, PSAT, FERR in the last 72 hours. Lab Results Component Value Date/Time Folate 8.5 03/14/2020 02:49 PM  
  
No results for input(s): PH, PCO2, PO2 in the last 72 hours. No results for input(s): CPK, CKNDX, TROIQ in the last 72 hours. No lab exists for component: CPKMB Lab Results Component Value Date/Time Cholesterol, total 141 11/11/2019 08:47 AM  
 HDL Cholesterol 32 (L) 11/11/2019 08:47 AM  
 LDL, calculated 82 11/11/2019 08:47 AM  
 Triglyceride 137 11/11/2019 08:47 AM  
 
Lab Results Component Value Date/Time  Glucose (POC) 85 09/07/2020 07:41 AM  
 Glucose (POC) 99 09/06/2020 11:29 PM  
 Glucose (POC) 75 09/06/2020 09:50 PM  
 Glucose (POC) 68 09/06/2020 09:45 PM  
 Glucose (POC) 203 (H) 09/06/2020 04:43 PM  
 
Lab Results Component Value Date/Time Color YELLOW/STRAW 05/23/2020 02:35 PM  
 Appearance CLOUDY (A) 05/23/2020 02:35 PM  
 Specific gravity 1.009 05/23/2020 02:35 PM  
 pH (UA) 6.0 05/23/2020 02:35 PM  
 Protein Negative 05/23/2020 02:35 PM  
 Glucose Negative 05/23/2020 02:35 PM  
 Ketone Negative 05/23/2020 02:35 PM  
 Bilirubin Negative 05/23/2020 02:35 PM  
 Urobilinogen 0.2 05/23/2020 02:35 PM  
 Nitrites Positive (A) 05/23/2020 02:35 PM  
 Leukocyte Esterase LARGE (A) 05/23/2020 02:35 PM  
 Epithelial cells FEW 05/23/2020 02:35 PM  
 Bacteria 4+ (A) 05/23/2020 02:35 PM  
 WBC >100 (H) 05/23/2020 02:35 PM  
 RBC 0-5 05/23/2020 02:35 PM  
 
 
 
Medications Reviewed:  
 
Current Facility-Administered Medications Medication Dose Route Frequency  warfarin - HOLD today 9/7   Other ONCE  
 vancomycin (VANCOCIN) 1500 mg in  ml infusion  1,500 mg IntraVENous ONCE  
 [START ON 9/9/2020] vancomycin (VANCOCIN) 1250 mg in  ml infusion  1,250 mg IntraVENous Q48H  
 diphenhydrAMINE (BENADRYL) capsule 25 mg  25 mg Oral Q6H PRN  Vancomycin- Pharmacy Dosing   Other Rx Dosing/Monitoring  potassium chloride SR (KLOR-CON 10) tablet 20 mEq  20 mEq Oral DAILY  [Held by provider] bumetanide (BUMEX) tablet 0.5 mg  0.5 mg Oral BID  
 HYDROmorphone (DILAUDID) tablet 3 mg  3 mg Oral Q4H PRN  
 sodium chloride (NS) flush 5-40 mL  5-40 mL IntraVENous Q8H  
 sodium chloride (NS) flush 5-40 mL  5-40 mL IntraVENous PRN  
 naloxone (NARCAN) injection 0.4 mg  0.4 mg IntraVENous PRN  
 ondansetron (ZOFRAN) injection 4 mg  4 mg IntraVENous Q4H PRN  
 insulin glargine (LANTUS) injection 10 Units  10 Units SubCUTAneous QHS  Warfarin- Pharmacy Dosing   Other Rx Dosing/Monitoring  HYDROmorphone (PF) (DILAUDID) injection 0.5 mg  0.5 mg IntraVENous Q3H PRN  
  nystatin (MYCOSTATIN) 100,000 unit/gram cream   Topical BID  sodium chloride (NS) flush 5-40 mL  5-40 mL IntraVENous Q8H  
 sodium chloride (NS) flush 5-40 mL  5-40 mL IntraVENous PRN  
 naloxone (NARCAN) injection 0.1 mg  0.1 mg IntraVENous PRN  
 bisacodyL (DULCOLAX) tablet 10 mg  10 mg Oral DAILY  hydrALAZINE (APRESOLINE) 20 mg/mL injection 20 mg  20 mg IntraVENous Q6H PRN  
 hydrALAZINE (APRESOLINE) tablet 100 mg  100 mg Oral TID  busPIRone (BUSPAR) tablet 10 mg  10 mg Oral BID  dilTIAZem ER (CARDIZEM CD) capsule 180 mg  180 mg Oral DAILY  sertraline (ZOLOFT) tablet 75 mg  75 mg Oral DAILY  acetaminophen (TYLENOL) tablet 650 mg  650 mg Oral Q6H PRN Or  
 acetaminophen (TYLENOL) suppository 650 mg  650 mg Rectal Q6H PRN  polyethylene glycol (MIRALAX) packet 17 g  17 g Oral DAILY PRN  promethazine (PHENERGAN) tablet 12.5 mg  12.5 mg Oral Q6H PRN  
 glucose chewable tablet 16 g  4 Tab Oral PRN  
 glucagon (GLUCAGEN) injection 1 mg  1 mg IntraMUSCular PRN  
 dextrose 10% infusion 0-250 mL  0-250 mL IntraVENous PRN  
 gabapentin (NEURONTIN) capsule 100 mg  100 mg Oral TID PRN  
 insulin regular (NOVOLIN R, HUMULIN R) injection   SubCUTAneous AC&HS  
 
______________________________________________________________________ EXPECTED LENGTH OF STAY: 3d 17h ACTUAL LENGTH OF STAY:          18 Marcus Lopez MD

## 2020-09-07 NOTE — PROGRESS NOTES
Bedside and Verbal shift change report given to Todd Zeng (oncoming nurse) by 2201 Cheyenne Browning (offgoing nurse). Report included the following information SBAR, Kardex, MAR and Recent Results.

## 2020-09-08 NOTE — PROGRESS NOTES
Problem: Risk for Spread of Infection Goal: Prevent transmission of infectious organism to others Description: Prevent the transmission of infectious organisms to other patients, staff members, and visitors. Outcome: Progressing Towards Goal 
  
Problem: Falls - Risk of 
Goal: *Absence of Falls Description: Document Darren White Fall Risk and appropriate interventions in the flowsheet. Outcome: Progressing Towards Goal 
Note: Fall Risk Interventions: 
Mobility Interventions: Communicate number of staff needed for ambulation/transfer, PT Consult for mobility concerns, Strengthening exercises (ROM-active/passive), Utilize walker, cane, or other assistive device Medication Interventions: Evaluate medications/consider consulting pharmacy Elimination Interventions: Call light in reach, Patient to call for help with toileting needs, Toileting schedule/hourly rounds Problem: Pain Goal: *Control of Pain Outcome: Progressing Towards Goal 
  
Problem: Pressure Injury - Risk of 
Goal: *Prevention of pressure injury Description: Document Valdemar Scale and appropriate interventions in the flowsheet. Outcome: Progressing Towards Goal 
Note: Pressure Injury Interventions: 
Sensory Interventions: Assess changes in LOC, Assess need for specialty bed, Keep linens dry and wrinkle-free, Maintain/enhance activity level, Minimize linen layers Moisture Interventions: Absorbent underpads, Apply protective barrier, creams and emollients, Limit adult briefs, Minimize layers, Moisture barrier Activity Interventions: Assess need for specialty bed, Increase time out of bed Mobility Interventions: Assess need for specialty bed, PT/OT evaluation Nutrition Interventions: Document food/fluid/supplement intake Friction and Shear Interventions: Apply protective barrier, creams and emollients

## 2020-09-08 NOTE — PROGRESS NOTES
Problem: Mobility Impaired (Adult and Pediatric) Goal: *Acute Goals and Plan of Care (Insert Text) Description: FUNCTIONAL STATUS PRIOR TO ADMISSION: Patient was modified independent using a rolling walker and and L BKA prosthesis for functional mobility. HOME SUPPORT PRIOR TO ADMISSION: The patient lived with family but did not require assist. 
Physical Therapy Goals Reviewed 9/3/2020 1. Patient will move from supine to sit and sit to supine , scoot up and down, and roll side to side in bed with minimal assistance/contact guard assist within 7 day(s). 2.  Patient will transfer from bed to chair and chair to bed with minimal assistance/contact guard assist using the least restrictive device within 7 day(s). 3.  Patient will perform sit to stand with minimal assistance/contact guard assist within 7 day(s). 4. Patient will ambulate with min assist/contact guard for 50 feet wearing prothesis with least restrictive device within 7 days Physical Therapy Goals Initiated 8/23/2020 1. Patient will move from supine to sit and sit to supine , scoot up and down, and roll side to side in bed with minimal assistance/contact guard assist within 7 day(s). 2.  Patient will transfer from bed to chair and chair to bed with minimal assistance/contact guard assist using the least restrictive device within 7 day(s). 3.  Patient will perform sit to stand with minimal assistance/contact guard assist within 7 day(s). 4.  Patient will ambulate with minimal assistance/contact guard assist for 10 feet with the least restrictive device within 7 day(s). Outcome: Progressing Towards Goal 
 PHYSICAL THERAPY TREATMENT Patient: Keith Lopez (25 y.o. female) Date: 9/8/2020 Diagnosis: Sepsis ([address] [A41.9] Sepsis ([address] Procedure(s) (LRB): 
RIGHT CHEST WOUND RE EXPLORATION, DELAYED CLOSURE (URGENT) (Right) 15 Days Post-Op Precautions: Fall, Contact(NWB RUE) Chart, physical therapy assessment, plan of care and goals were reviewed. ASSESSMENT Patient continues with skilled PT services and is progressing towards goals. Pt continues to make gains. With set up she was able to [place] her prosthesis. With the bed elevated so that pt hips were slightly higher than her knees, she was able to stand with mod assist but took two attempts. With the hemiwalker for support on her left (remains NWB RUE), she amb 6 feet with assist X 1, required assist X 2 last session. She was limited by some fatigue (had a previous lengthy OT session prior to our session and was NPO) but she was willing to try all that was asked of her. Anticipate steady continued gains in rehab, she is an excellent candidate for impatient rehab. Denisse Sampson Current Level of Function Impacting Discharge (mobility/balance): min to mod assist X 1 Other factors to consider for discharge: high fall risk. Left BKA. PLAN : 
Patient continues to benefit from skilled intervention to address the above impairments. Continue treatment per established plan of care. to address goals. Recommendation for discharge: (in order for the patient to meet his/her long term goals) Therapy 3 hours per day 5-7 days per week This discharge recommendation: A follow-up discussion with the attending provider and/or case management is planned IF patient discharges home will need the following DME: to be determined (TBD), none if rehab. SUBJECTIVE:  
Patient stated I'm really tired but I'll do what I can.  OBJECTIVE DATA SUMMARY:  
Chart checked, pt cleared by nursing Critical Behavior: 
Neurologic State: Alert, Appropriate for age Orientation Level: Oriented X4 Cognition: Appropriate decision making, Appropriate for age attention/concentration, Appropriate safety awareness, Follows commands Safety/Judgement: Awareness of environment Functional Mobility Training: 
Bed Mobility: 
Rolling: Moderate assistance Supine to Sit: Minimum assistance Sit to Supine: Minimum assistance Transfers: 
Sit to Stand: Moderate assistance Stand to Sit: Minimum assistance Balance: 
Sitting: Impaired; Without support Sitting - Static: Good (unsupported) Sitting - Dynamic: Good (unsupported) Standing: Impaired; With support Standing - Static: Constant support; Fair 
Standing - Dynamic : Constant support; Leory Zarco Ambulation/Gait Training: 
Distance (ft): 6 Feet (ft) Assistive Device: Gait belt;Walker hattie(left prosthesis) Ambulation - Level of Assistance: Minimal assistance;Assist x1 Gait Abnormalities: Antalgic;Decreased step clearance;Trunk sway increased Base of Support: Shift to right Step Length: Left shortened;Right shortened Stairs: Therapeutic Exercises:  
 
Pain Rating: 
None rated Activity Tolerance:  
Good and requires rest breaks Please refer to the flowsheet for vital signs taken during this treatment. After treatment patient left in no apparent distress:  
Supine in bed, Call bell within reach, and Side rails x 3 
 
COMMUNICATION/COLLABORATION:  
The patients plan of care was discussed with: Occupational therapy assistant and Registered nurse. Tamika Care Time Calculation: 21 mins

## 2020-09-08 NOTE — PROGRESS NOTES
TRANSFER - OUT REPORT: 
 
Verbal report given to Mattie RN(name) on Myrna Barrett  being transferred to [facility](unit) for routine progression of care Report consisted of patients Situation, Background, Assessment and  
Recommendations(SBAR). Information from the following report(s) SBAR, Procedure Summary and MAR was reviewed with the receiving nurse. Lines:  
Venous Access Device Proline CT with cuff 09/08/20 (Active) Peripheral IV 09/07/20 Anterior; Left Forearm (Active) Site Assessment Clean, dry, & intact 09/08/20 0750 Phlebitis Assessment 0 09/08/20 0750 Infiltration Assessment 0 09/08/20 0750 Dressing Status Clean, dry, & intact 09/08/20 0750 Dressing Type Transparent;Tape 09/08/20 0750 Hub Color/Line Status Flushed 09/08/20 1150 Action Taken Open ports on tubing capped 09/08/20 0750 Alcohol Cap Used Yes 09/08/20 0750 Opportunity for questions and clarification was provided.

## 2020-09-08 NOTE — PROGRESS NOTES
9/8/2020 - 
TEE: 
- RUR: 36% - Disposition is for discharge to [facility], likely 9/9 
- Patient will need re-auth for IPR placement today, 9/8 - Patient will need BLS transport; [facility] is on will call - Patient to have Simone Cath placed today, 9/8, but not until some time this afternoon - Patient needs to work with PT and OT today, 9/8, for re-auth process 09:20 -  
CM contacted [employer]: [phone]) to notify of anticipated 9/9 discharge. Per Abram Decker, patient's Emmalene Rogers will need to be ran again, so patient needs to work with PT and OT today. Attending and nursing are aware of the above. CRM: Andi Begum, MPH, [address] [place] [address]; Z: [phone]

## 2020-09-08 NOTE — PROGRESS NOTES
Bedside and Verbal shift change report given to Omar Gibbs (oncoming nurse) by Agapito Quinn RN (offgoing nurse). Report included the following information SBAR, Kardex, Procedure Summary, Intake/Output, MAR, Recent Results and Cardiac Rhythm AFIB.

## 2020-09-08 NOTE — PROGRESS NOTES
Problem: Self Care Deficits Care Plan (Adult) Goal: *Acute Goals and Plan of Care (Insert Text) Description:  
FUNCTIONAL STATUS PRIOR TO ADMISSION: Patient was MOD I for ADLs/iADLs. With L BKA. Using prosthetic for ADL related mobility with walker PTA. Reports she had been home about a month after rehab stay at [facility] prior to this admission. HOME SUPPORT: The patient lived with  but did not require assistance for basic ADLs Occupational Therapy Goals Goals reviewed and updated: 9/8/2020 1. Patient will perform upper body dressing with supervision within 7 day(s). 2.  Patient will perform lower body dressing with moderate assistance  within 7 day(s). 3.  Patient will perform toilet transfers with CGA and appropriate DME to [facility] within 7 day(s). 4.  Patient will perform all aspects of toileting with moderate assistance within 7 day(s). 5.  Patient will participate in upper extremity therapeutic exercise/activities with supervision as tolerated for 15 minutes within 7 day(s). 6.  Patient will utilize energy conservation techniques during functional activities with verbal cues within 7 day(s). 7.  Patient will demonstrate bilateral UE HEP with supervision within 7 days Weekly Re-Assessment 9/1/2020, goals modified below Initiated 8/25/2020 1. Patient will perform grooming seated EOB with moderate assistance within 7 day(s). MODIFIED to utilizing RUE for at least 60% of task 9/1. MET 2. Patient will perform upper body dressing with minimal assistance/contact guard assist within 7 day(s). MET, UPGRADED to supervision 9/1 3. Patient will perform lower body dressing with moderate assistance  within 7 day(s). CONTINUE 4.  Patient will perform toilet transfers with moderate assistance within 7 day(s). MET, MODIFIED to transfer to/from [facility] with Mod A x1 and least restrictive DME 9/1 MET 5.   Patient will perform all aspects of toileting with moderate assistance within 7 day(s). CONTINUE 6. Patient will participate in upper extremity therapeutic exercise/activities with moderate assistance as tolerated for 15 minutes within 7 day(s). CONTINUE 7. Patient will utilize energy conservation techniques during functional activities with verbal cues within 7 day(s). CONTINUE. MET 
9/8/2020 1401 by Pressley Cooks Outcome: Progressing Towards Goal 
 OCCUPATIONAL THERAPY TREATMENT/WEEKLY RE-ASSESSMENT Patient: Kathie De Dios (52 y.o. female) Date: 9/8/2020 Diagnosis: Sepsis ([facility] [address] [A41.9] Sepsis ([facility] [address] Procedure(s) (LRB): 
RIGHT CHEST WOUND RE EXPLORATION, DELAYED CLOSURE (URGENT) (Right) 15 Days Post-Op Precautions: Fall, Contact(per thoracic, no restrictions aside from pain with RUE) Chart, occupational therapy assessment, plan of care, and goals were reviewed. ASSESSMENT Patient continues with skilled OT services and is progressing towards goals. Patient met 3/7 goals and continues to progress toward all goals. Participation impacted by impaired functional use of right UE, though patient now using right UE as a functional assist without weight bearing to right UE. Participation also impacted by impaired static and dynamic standing and bilateral LE weakness impacting ability to move sit to/from standing. Elevated height bed and BSC needed to increase ability to move sit to/from stand. Patient wears a prothesis on left LE. She is able to don and doff with mod I. Goals upgraded. Current Level of Function Impacting Discharge (ADLs): UE self care with set up, LE self care with set up and perineal with total assist, toilet transfer to [facility] with hemiwalker and min to mod assist of 1 and elevated height surface, grooming with set up to Mod I. Other factors to consider for discharge: none PLAN : 
Goals have been updated based on progression since last assessment.  Patient continues to benefit from skilled intervention to address the above impairments. Continue to follow patient 5 times a week to address goals. Recommend with staff: Kavya Orion in chair for meals and self care, UE self care with set up, LE self care with set up and perineal with total assist, toilet transfer to [facility] with hemiwalker and min to mod assist of 1 and elevated height surface, grooming with set up to Mod I. Recommend next OT session: LE self care Recommendation for discharge: (in order for the patient to meet his/her long term goals) Therapy 3 hours per day 5-7 days per week This discharge recommendation: 
Has been made in collaboration with the attending provider and/or case management IF patient discharges home will need the following DME:  bedside commode, hospital bed,  transfer bench, wheelchair, and sliding/transfer board SUBJECTIVE:  
Patient stated I'm just trying to balance myself with it.  re use of right UE OBJECTIVE DATA SUMMARY:  
Cognitive/Behavioral Status: 
Neurologic State: Alert; Appropriate for age Orientation Level: Oriented X4 Cognition: Appropriate decision making; Appropriate for age attention/concentration; Appropriate safety awareness; Follows commands Perception: Appears intact Perseveration: No perseveration noted Safety/Judgement: Awareness of environment Functional Mobility and Transfers for ADLs: 
Bed Mobility: 
  
 
Transfers: 
Sit to Stand: Moderate assistance;Assist x1;Adaptive equipment; Other (comment)(bed elevated) Functional Transfers Toilet Transfer : Moderate assistance;Assist x1;Additional time; Adaptive equipment Cues: Physical assistance;Verbal cues provided Adaptive Equipment: Other (comment); Bedside commode(hemiwalker) Balance: 
Sitting - Static: Good (unsupported) Sitting - Dynamic: Good (unsupported) Standing: Impaired; With support Standing - Static: Fair;Constant support Standing - Dynamic : Fair;Constant support ADL Intervention: 
  
 
Grooming Position Performed: Seated edge of bed Washing Face: Set-up Brushing Teeth: Modified independent Upper Body Bathing Bathing Assistance: Set-up Position Performed: Seated edge of bed Lower Body Bathing Perineal  : Total assistance (dependent) Position Performed: Standing Cues: Physical assistance pants down;Physical assistance pants up(physical assisst) Adaptive Equipment: Other(comment)(hemiwalker) Lower Body : Set-up Position Performed: Seated edge of bed Upper Body Dressing Assistance Hospital Gown: Minimum  assistance Lower Body Dressing Assistance Protective Undergarmet: Maximum assistance Pants With Elastic Waist: Moderate assistance(inferred from mobility) Socks: Set-up Leg Crossed Method Used: Yes(for right LE only) Position Performed: Seated edge of bed;Standing Cues: Physical assistance Adaptive Equipment Used: Other(comment)(hemiwalker) Toileting Bladder Hygiene: Total assistance (dependent) Bowel Hygiene: Total assistance (dependent) Clothing Management: Total assistance (dependent) Adaptive Equipment: Other (comment)(hemiwalker) Cognitive Retraining Safety/Judgement: Awareness of environment Activity Tolerance:  
Fair Please refer to the flowsheet for vital signs taken during this treatment. After treatment patient left in no apparent distress:  
Supine in bed, Call bell within reach, and Side rails x 3 
 
COMMUNICATION/COLLABORATION:  
The patients plan of care was discussed with: Occupational therapist and Registered nurse. [address] Time Calculation: 74 mins Charge minus 20 mins of time secondary to patient toileting on [address] for this time period.

## 2020-09-08 NOTE — H&P
Interventional and Vascular Radiology History and Physical 
 
Patient: Myrna Barrett 60 y.o. female Chief Complaint: No chief complaint on file. History of Present Illness: antibiotics History: 
 
Past Medical History:  
Diagnosis Date  Acquired lymphedema Right arm  Arrhythmia  Asthma  Atrial fibrillation ([facility] [address] Dr. Annemarie Napoles and [facility])  Cancer ([address] bilateral breast  
 Chronic kidney disease  Diabetes mellitus type II   
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 ([address] 3/3/2020  Dizziness  Essential hypertension  Hyperlipidemia  Hypertension  Long term (current) use of anticoagulants  Morbid obesity ([address] 3/14/2012  Nausea & vomiting  Neuropathy  Osteoarthritis  Pacemaker  Sick sinus syndrome ([address]  Type 2 diabetes mellitus with left diabetic foot infection ([facility] [address] 10/29/2019 Family History Problem Relation Age of Onset  Cancer Mother  Heart Disease Mother  Alcohol abuse Father  Diabetes Brother  Hypertension Brother Social History Socioeconomic History  Marital status:  Spouse name: Not on file  Number of children: Not on file  Years of education: Not on file  Highest education level: Not on file Occupational History  Not on file Social Needs  Financial resource strain: Not on file  Food insecurity Worry: Not on file Inability: Not on file  Transportation needs Medical: Not on file Non-medical: Not on file Tobacco Use  Smoking status: Never Smoker  Smokeless tobacco: Never Used Substance and Sexual Activity  Alcohol use: Not Currently  Drug use: No  
 Sexual activity: Not on file Lifestyle  Physical activity Days per week: Not on file Minutes per session: Not on file  Stress: Not on file Relationships  Social connections Talks on phone: Not on file Gets together: Not on file Attends Mandaeism service: Not on file Active member of club or organization: Not on file Attends meetings of clubs or organizations: Not on file Relationship status: Not on file  Intimate partner violence Fear of current or ex partner: Not on file Emotionally abused: Not on file Physically abused: Not on file Forced sexual activity: Not on file Other Topics Concern  Not on file Social History Narrative  Not on file Allergies: Allergies Allergen Reactions  Ace Inhibitors Cough  Amlodipine Swelling  Avapro [Irbesartan] Unable to Obtain  Bactrim [Sulfamethoxazole-Trimethoprim] Other (comments) Sore mouth  Captopril Cough  Carvedilol Diarrhea  Contrast Agent [Iodine] Itching [address]  Morphine Nausea and Vomiting and Swelling  Penicillins Hives Did not tolerate cefepime 3/2020  Pravachol [Pravastatin] Nausea Only  Seafood [Shellfish Containing Products] Hives  Singulair [Montelukast] Other (comments)  
  palpitations Current Medications: 
Current Facility-Administered Medications Medication Dose Route Frequency  bacitracin 500 unit/gram ointment   Topical TID  warfarin (COUMADIN) tablet 2.5 mg  2.5 mg Oral ONCE  
 lidocaine (XYLOCAINE) 20 mg/mL (2 %) injection 400 mg  20 mL SubCUTAneous ONCE  
 heparin (porcine) pf 300 Units  300 Units InterCATHeter PRN  
 heparin (porcine) 1,000 unit/mL injection  lidocaine (XYLOCAINE) 20 mg/mL (2 %) injection  fentaNYL citrate (PF) injection 200 mcg  200 mcg IntraVENous Multiple  midazolam (PF) (VERSED) injection 5 mg  5 mg IntraVENous Rad Multiple  0.9% sodium chloride infusion 500 mL  500 mL IntraVENous CONTINUOUS  
  saline peripheral flush soln 10 mL  10 mL InterCATHeter PRN  
 clindamycin (CLEOCIN) 600mg D5W 50mL IVPB (premix)  600 mg IntraVENous ONCE  
 [START ON 9/9/2020] vancomycin (VANCOCIN) 1250 mg in  ml infusion  1,250 mg IntraVENous Q48H  
 diphenhydrAMINE (BENADRYL) capsule 25 mg  25 mg Oral Q6H PRN  Vancomycin- Pharmacy Dosing   Other Rx Dosing/Monitoring  potassium chloride SR (KLOR-CON 10) tablet 20 mEq  20 mEq Oral DAILY  [Held by provider] bumetanide (BUMEX) tablet 0.5 mg  0.5 mg Oral BID  
 HYDROmorphone (DILAUDID) tablet 3 mg  3 mg Oral Q4H PRN  
 sodium chloride (NS) flush 5-40 mL  5-40 mL IntraVENous Q8H  
 sodium chloride (NS) flush 5-40 mL  5-40 mL IntraVENous PRN  
 naloxone (NARCAN) injection 0.4 mg  0.4 mg IntraVENous PRN  
 ondansetron (ZOFRAN) injection 4 mg  4 mg IntraVENous Q4H PRN  
 insulin glargine (LANTUS) injection 10 Units  10 Units SubCUTAneous QHS  Warfarin- Pharmacy Dosing   Other Rx Dosing/Monitoring  HYDROmorphone (PF) (DILAUDID) injection 0.5 mg  0.5 mg IntraVENous Q3H PRN  
 nystatin (MYCOSTATIN) 100,000 unit/gram cream   Topical BID  sodium chloride (NS) flush 5-40 mL  5-40 mL IntraVENous Q8H  
 sodium chloride (NS) flush 5-40 mL  5-40 mL IntraVENous PRN  
 naloxone (NARCAN) injection 0.1 mg  0.1 mg IntraVENous PRN  
 bisacodyL (DULCOLAX) tablet 10 mg  10 mg Oral DAILY  hydrALAZINE (APRESOLINE) 20 mg/mL injection 20 mg  20 mg IntraVENous Q6H PRN  
 hydrALAZINE (APRESOLINE) tablet 100 mg  100 mg Oral TID  busPIRone (BUSPAR) tablet 10 mg  10 mg Oral BID  dilTIAZem ER (CARDIZEM CD) capsule 180 mg  180 mg Oral DAILY  sertraline (ZOLOFT) tablet 75 mg  75 mg Oral DAILY  acetaminophen (TYLENOL) tablet 650 mg  650 mg Oral Q6H PRN Or  
 acetaminophen (TYLENOL) suppository 650 mg  650 mg Rectal Q6H PRN  polyethylene glycol (MIRALAX) packet 17 g  17 g Oral DAILY PRN  promethazine (PHENERGAN) tablet 12.5 mg  12.5 mg Oral Q6H PRN  
  glucose chewable tablet 16 g  4 Tab Oral PRN  
 glucagon (GLUCAGEN) injection 1 mg  1 mg IntraMUSCular PRN  
 dextrose 10% infusion 0-250 mL  0-250 mL IntraVENous PRN  
 gabapentin (NEURONTIN) capsule 100 mg  100 mg Oral TID PRN  
 insulin regular (NOVOLIN R, HUMULIN R) injection   SubCUTAneous AC&HS Physical Exam: 
Blood pressure 132/61, pulse 83, temperature 97.9 °F (36.6 °C), resp. rate 12, height 5' 10\" (1.778 m), weight 101.2 kg (223 lb 1.7 oz), SpO2 99 %. LUNG: clear to auscultation bilaterally, HEART: regular rate and rhythm, S1, S2 normal, no murmur, click, rub or gallop Alerts:   
Hospital Problems  Date Reviewed: 8/24/2020 None Laboratory:     
Recent Labs  
  09/08/20 
0478 INR 2.3*  
BUN 25* CREA 1.92* K 4.2 Plan of Care/Planned Procedure: 
Risks, benefits, and alternatives reviewed with patient and she agrees to proceed with the procedure. Conscious sedation will be performed with IV fentanyl and versed. Plan is for veronica catheter placement Lazaro Figueroa MD

## 2020-09-08 NOTE — PROGRESS NOTES
Bedside and Verbal shift change report given to Larry Landeros (oncoming nurse) by Steve Deal RN (offgoing nurse). Report included the following information SBAR, Kardex, ED Summary, Procedure Summary, Intake/Output, MAR, Recent Results and Cardiac Rhythm A Fib.

## 2020-09-08 NOTE — PROGRESS NOTES
Pharmacist Note  Warfarin Dosing Consult provided for this 60 y. o.female to manage warfarin for Atrial Fibrillation INR Goal: 2 - 3 Home regimen/ tablet size: 2 mg PO daily Drugs that may increase INR: None Drugs that may decrease INR: None Other current anticoagulants/ drugs that may increase bleeding risk: Sertraline Risk factors: None Daily INR ordered: YES Recent Labs  
  09/08/20 
0506 09/07/20 
0245 09/06/20 
0047 INR 2.3* 2.3* 2.7* Date               INR                  Dose 8/27  --  3 mg  
8/28                1.1                   3 mg 
8/29                1.1                   3 mg 
8/30                1.1                   3 mg 
8/31                1.1                   6 mg 
9/1                  1.3                   5 mg 
9/2                  1.4                   5 mg 
9/3                  1.7                   5 mg 
9/4                  2.4                   Held by MD 
9/5                  2.5                   Held by MD 
9/6                  2.7                   Held by MD 
9/7                  2.3                   Hold 9/8                  2.3                   2.5 mg 
                                                                            
Assessment/ Plan: Will order warfarin 2.5 mg PO x 1 (after veronica catheter placement today). Pharmacy will continue to monitor daily and adjust therapy as indicated. Please contact the pharmacist at  for outpatient recommendations if needed.   
 
 
Ethan Adams, PharmD, BCPS

## 2020-09-08 NOTE — PROGRESS NOTES
Infectious Disease Progress Note Subjective: Ms [facility] seen earlier. INR still up > 2,  awaiting catheter placement Denied diarrhea ROS: 10 point ROS obtained and pertinent positives include above. All others negative. Objective: 
 
Vitals:  
Patient Vitals for the past 24 hrs: 
 Temp Pulse Resp BP SpO2  
09/08/20 0750 97.8 °F (36.6 °C) 86 16 153/75 95 % 09/08/20 0455 97.7 °F (36.5 °C) 81 16 154/70 96 % 09/08/20 0038 97.6 °F (36.4 °C) 85 16 133/64 97 % 09/07/20 2020 98 °F (36.7 °C) 96 17 150/73 97 % 09/07/20 1531 97.8 °F (36.6 °C) 88 16 144/75 98 % 09/07/20 1227 98.5 °F (36.9 °C) 98 16 150/77 97 % Physical Exam: 
Gen: No apparent distress HEENT:   no scleral icterus, no thrush, CV: S1,2 heard regularly, no lower extremity edema  Drains removed, no jacinta erythema around surgical site , no  discharge seen Lungs: Clear to auscultation bilaterally, Abdomen: soft, non tender,  
Skin: no rash , RLE chronic venous changes to skin Musculoskeletal:  No joint edema, erythema or tenderness noted RLE, + LLE BKA Medications: 
 
Current Facility-Administered Medications:  
  bacitracin 500 unit/gram ointment, , Topical, TID, Bret Cuevas MD 
  [START ON 9/9/2020] vancomycin (VANCOCIN) 1250 mg in  ml infusion, 1,250 mg, IntraVENous, Q48H, aJye Gruber DO 
  diphenhydrAMINE (BENADRYL) capsule 25 mg, 25 mg, Oral, Q6H PRN, Bret Cuevas MD, 25 mg at 09/05/20 2206   Vancomycin- Pharmacy Dosing, , Other, Rx Dosing/Monitoring, Jaye Gruber DO 
  potassium chloride SR (KLOR-CON 10) tablet 20 mEq, 20 mEq, Oral, DAILY, Isa Duran MD, 20 mEq at 09/08/20 5793   [Held by provider] bumetanide (BUMEX) tablet 0.5 mg, 0.5 mg, Oral, BID, Isa Duran MD, 0.5 mg at 09/05/20 0807 
  HYDROmorphone (DILAUDID) tablet 3 mg, 3 mg, Oral, Q4H PRN, Isa Duran MD, 3 mg at 09/08/20 1172   sodium chloride (NS) flush 5-40 mL, 5-40 mL, IntraVENous, Q8H, Bebe De La Cruz MD, 10 mL at 09/08/20 3466   sodium chloride (NS) flush 5-40 mL, 5-40 mL, IntraVENous, PRN, Bebe De La Cruz MD 
  naloxone [facility]) injection 0.4 mg, 0.4 mg, IntraVENous, PRN, Bebe De La Cruz MD 
  ondansetron [facility]) injection 4 mg, 4 mg, IntraVENous, Q4H PRN, Bebe De La Cruz MD 
  insulin glargine (LANTUS) injection 10 Units, 10 Units, SubCUTAneous, QHS, Warden Cris MD, 10 Units at 09/07/20 2204   Warfarin- Pharmacy Dosing, , Other, Rx Dosing/Monitoring, Warden Cris MD 
  HYDROmorphone (PF) (DILAUDID) injection 0.5 mg, 0.5 mg, IntraVENous, Q3H PRN, Warden Cris MD, 0.5 mg at 09/07/20 1917 
  nystatin (MYCOSTATIN) 100,000 unit/gram cream, , Topical, BID, Dewayne Russo MD 
  sodium chloride (NS) flush 5-40 mL, 5-40 mL, IntraVENous, Q8H, [place], 10 mL at 09/08/20 0036   sodium chloride (NS) flush 5-40 mL, 5-40 mL, IntraVENous, PRN, LORNA Nye, 10 mL at 09/07/20 1917 
  naloxone [facility]) injection 0.1 mg, 0.1 mg, IntraVENous, PRN, LORNA Nye 
  bisacodyL (DULCOLAX) tablet 10 mg, 10 mg, Oral, DAILY, LORNA Nye, 10 mg at 08/31/20 3076   hydrALAZINE (APRESOLINE) 20 mg/mL injection 20 mg, 20 mg, IntraVENous, Q6H PRN, Dario Mathew MD, 20 mg at 08/28/20 6917   hydrALAZINE (APRESOLINE) tablet 100 mg, 100 mg, Oral, TID, Dario Mathew MD, 100 mg at 09/08/20 0467   busPIRone (BUSPAR) tablet 10 mg, 10 mg, Oral, BID, Bebe De La Cruz MD, 10 mg at 09/08/20 5890   dilTIAZem ER (CARDIZEM CD) capsule 180 mg, 180 mg, Oral, DAILY, Bebe De La Cruz MD, 180 mg at 09/08/20 1233   sertraline (ZOLOFT) tablet 75 mg, 75 mg, Oral, DAILY, Bebe De La Cruz MD, 75 mg at 09/08/20 2864   acetaminophen (TYLENOL) tablet 650 mg, 650 mg, Oral, Q6H PRN, 650 mg at 09/07/20 2205 **OR** acetaminophen (TYLENOL) suppository 650 mg, 650 mg, Rectal, Q6H PRN, Paty Ramirez MD 
  polyethylene glycol [facility]) packet 17 g, 17 g, Oral, DAILY PRN, Paty Ramirez MD 
  promethazine (PHENERGAN) tablet 12.5 mg, 12.5 mg, Oral, Q6H PRN **OR** [DISCONTINUED] ondansetron (ZOFRAN) injection 4 mg, 4 mg, IntraVENous, Q6H PRN, Paty Ramirez MD 
  glucose chewable tablet 16 g, 4 Tab, Oral, PRN, Jaylene Villalobos, DO 
  glucagon (GLUCAGEN) injection 1 mg, 1 mg, IntraMUSCular, PRN, Teetee GRIMES, DO 
  dextrose 10% infusion 0-250 mL, 0-250 mL, IntraVENous, PRN, Jaylene Villalobos, DO 
  gabapentin (NEURONTIN) capsule 100 mg, 100 mg, Oral, TID PRN, Teetee GRIMES, DO, 100 mg at 09/08/20 0237 
  insulin regular (NOVOLIN R, HUMULIN R) injection, , SubCUTAneous, AC&HS, Teetee GRIMES, DO, Stopped at 09/06/20 2200 Labs: 
Recent Results (from the past 24 hour(s)) GLUCOSE, POC Collection Time: 09/07/20 11:59 AM  
Result Value Ref Range Glucose (POC) 118 (H) 65 - 100 mg/dL Performed by Nova Seek GLUCOSE, POC Collection Time: 09/07/20  4:37 PM  
Result Value Ref Range Glucose (POC) 122 (H) 65 - 100 mg/dL Performed by Nova Seek GLUCOSE, POC Collection Time: 09/07/20  9:39 PM  
Result Value Ref Range Glucose (POC) 137 (H) 65 - 100 mg/dL Performed by Gautam Vanegas PROTHROMBIN TIME + INR Collection Time: 09/08/20  5:06 AM  
Result Value Ref Range INR 2.3 (H) 0.9 - 1.1 Prothrombin time 21.9 (H) 9.0 - 11.1 sec METABOLIC PANEL, BASIC Collection Time: 09/08/20  5:06 AM  
Result Value Ref Range Sodium 137 136 - 145 mmol/L Potassium 4.2 3.5 - 5.1 mmol/L Chloride 108 97 - 108 mmol/L  
 CO2 24 21 - 32 mmol/L Anion gap 5 5 - 15 mmol/L Glucose 74 65 - 100 mg/dL BUN 25 (H) 6 - 20 MG/DL Creatinine 1.92 (H) 0.55 - 1.02 MG/DL  
 BUN/Creatinine ratio 13 12 - 20 GFR est AA 32 (L) >60 ml/min/1.73m2 GFR est non-AA 27 (L) >60 ml/min/1.73m2  Calcium 9.0 8.5 - 10.1 MG/DL  
 GLUCOSE, POC Collection Time: 09/08/20  7:48 AM  
Result Value Ref Range Glucose (POC) 91 65 - 100 mg/dL Performed by Aureliano Buchanan Micro:  
 
Wound 8/25/20 Specimen Information:  Tissue STERNUM   
     
Component  Value  Ref Range & Units  Status Special Requests:  MANUBRIUM   Final   
GRAM STAIN  RARE WBCS SEEN     Final   
GRAM STAIN  NO ORGANISMS SEEN     Final   
Culture result: Abnormal        Final   
LIGHT STAPHYLOCOCCUS AUREUS Susceptibility Staphylococcus aureus BRITTON Ciprofloxacin ($)  <=0.5 ug/mL  S Clindamycin ($)  0.25 ug/mL  S Daptomycin ($$$$$)  0.25 ug/mL  S Doxycycline ($$)  <=0.5 ug/mL  S Erythromycin ($$$$)  <=0.25 ug/mL  S Gentamicin ($)  <=0.5 ug/mL  S Levofloxacin ($)  <=0.12 ug/mL  S Linezolid ($$$$$)  2 ug/mL  S Moxifloxacin ($$$$)  <=0.25 ug/mL  S Oxacillin  <=0.25 ug/mL  S Rifampin ($$$$)  <=0.5 ug/mL  S1 Tetracycline  <=1 ug/mL  S Trimeth/Sulfa  <=10 ug/mL  S Vancomycin ($)  1 ug/mL  S Blood: 8/20/20 Specimen Information:  Blood   
     
Component  Value  Ref Range & Units  Status Special Requests:  NO SPECIAL REQUESTS     Preliminary Culture result:  NO GROWTH 4 DAYS Wound Sternoclavicular joint Tissue RIGHT STERNOCLAVICULAR JOINT A   
     
Component  Value  Ref Range & Units  Status Special Requests:  NO SPECIAL REQUESTS     Final   
GRAM STAIN  NO WBC'S SEEN     Final   
GRAM STAIN  NO ORGANISMS SEEN     Final   
Culture result: Abnormal        Final   
HEAVY STAPHYLOCOCCUS AUREUS Susceptibility Staphylococcus aureus BRITTON Ciprofloxacin ($)  <=0.5 ug/mL  S Clindamycin ($)  0.25 ug/mL  S Daptomycin ($$$$$)  0.25 ug/mL  S Doxycycline ($$)  <=0.5 ug/mL  S Erythromycin ($$$$)  <=0.25 ug/mL  S Gentamicin ($)  <=0.5 ug/mL  S Levofloxacin ($)  <=0.12 ug/mL  S Linezolid ($$$$$)  2 ug/mL  S    
 Moxifloxacin ($$$$)  <=0.25 ug/mL  S Oxacillin  <=0.25 ug/mL  S Rifampin ($$$$)  <=0.5 ug/mL  S1 Tetracycline  <=1 ug/mL  S Trimeth/Sulfa  <=10 ug/mL  S Vancomycin ($)  1 ug/mL  S Component  Value  Ref Range & Units  Status Special Requests:    Final   
RIGHT STERNOCLAVICULAR JOINT ABSCESS Culture result:  NO ANAEROBES ISOLATED     Final   
Result History Tissue RIGHT STERNOCLAVICULAR JOINT A   
     
Component  Value  Ref Range & Units  Status Special Requests:  NO SPECIAL REQUESTS     Final   
GRAM STAIN  NO WBC'S SEEN     Final   
GRAM STAIN  NO ORGANISMS SEEN     Final   
Culture result: Abnormal        Final   
HEAVY STAPHYLOCOCCUS AUREUS Susceptibility Staphylococcus aureus BRITTON Ciprofloxacin ($)  <=0.5 ug/mL  S Clindamycin ($)  0.25 ug/mL  S Daptomycin ($$$$$)  0.25 ug/mL  S Doxycycline ($$)  <=0.5 ug/mL  S Erythromycin ($$$$)  <=0.25 ug/mL  S Gentamicin ($)  <=0.5 ug/mL  S Levofloxacin ($)  <=0.12 ug/mL  S Linezolid ($$$$$)  2 ug/mL  S Moxifloxacin ($$$$)  <=0.25 ug/mL  S Oxacillin  <=0.25 ug/mL  S Rifampin ($$$$)  <=0.5 ug/mL  S1 Tetracycline  <=1 ug/mL  S Trimeth/Sulfa  <=10 ug/mL  S Vancomycin ($)  1 ug/mL  S Component  Value  Ref Range & Units  Status Special Requests:    Final   
RIGHT STERNOCLAVICULAR JOINT ABSCESS Culture result:  NO ANAEROBES ISOLATED     Final   
Result History CULTURE, ANAEROBIC on 8/22/2020 Imaging: 
CT chest 8/19/20 
  
FINDINGS: 
  
CHEST WALL: No mass or axillary lymphadenopathy. THYROID: No nodule. MEDIASTINUM: No mass or lymphadenopathy. ASHLYN: No mass or lymphadenopathy. THORACIC AORTA: No aneurysm. MAIN PULMONARY ARTERY: Normal in caliber. TRACHEA/BRONCHI: Patent. ESOPHAGUS: No wall thickening or dilatation. HEART: Coronary artery calcifications are present. PLEURA: No effusion or pneumothorax. LUNGS: No nodule, mass, or airspace disease. INCIDENTALLY IMAGED UPPER ABDOMEN: There is a right adrenal adenoma measuring 2.8 cm. BONES: There is inflammation involving the right sternoclavicular joint. There 
is no focal fluid collection. 
  
IMPRESSION IMPRESSION: 
Suspect septic arthritis of the right sternoclavicular joint. No drainable fluid Collection. TTE 8/20/2020 · LV: Estimated LVEF is 55 - 60%. Visually measured ejection fraction. Normal cavity size and systolic function (ejection fraction normal). Mild concentric hypertrophy. Wall motion: normal. Normal left ventricular strain. · LA: Moderately dilated left atrium. · AV: Aortic valve leaflet calcification present without reduced excursion. · MV: Mitral valve non-specific thickening. Echo Findings Left Ventricle  Normal cavity size and systolic function (ejection fraction normal). Mild concentric hypertrophy. Wall motion: normal. The estimated EF is 55 - 60%. Visually measured ejection fraction. Normal left ventricular strain. Left Atrium  Moderately dilated left atrium. Right Ventricle  Normal cavity size and global systolic function. Right Atrium  Normal cavity size. Aortic Valve  No stenosis and no regurgitation. There is leaflet calcification without reduced excursion. Mitral Valve  No stenosis. Mitral valve non-specific thickening. Trace regurgitation. Tricuspid Valve  Normal valve structure, no stenosis and no regurgitation. Pulmonic Valve  Pulmonic valve not well visualized. Aorta  Normal aortic root. Pericardium  No evidence of pericardial effusion. Assessment / Plan Ms. Wolfgang Guerrero is a 76-year-old lady with a history of diabetes, hypertension, A. 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 
who was transferred from [facility] for right sternoclavicular joint osteomyelitis//septic joint. She underwent incision and debridement of the right sternoclavicular joint, resection of the middle third of the right clavicle and placement of wound VAC on 8/20/2020. Operative findings include \" jacinta purulence within the [place] joint which was eroded\" Her blood culture on 8/20/2020 was negative but wound cultures from [place] joint debridement were positive for MSSA She is currently on IV vancomycin She reports a history of myalgias and muscle issues while on daptomycin previously She lists an allergy to antibiotics including Bactrim as well as penicillin. She had hives with penicillin. Cephalosporin was attempted and she says she passed out almost and had no idea what was happening to her when she received cefepime in the past. 
 
1) sternoclavicular joint abscess, osteomyelitis with cx + for MSSA History of MSSA bacteremia, pacer vegetation status post removal and endocarditis treatment in April to May 2020 Left foot osteomyelitis status post amputation/BKA May 2020 I discussed with the patient that vancomycin is  not the best medication for MSSA. However given her issues with antibiotics in the past to cephalosporins, penicillin and daptomycin,  we will continue with IV vancomycin as choices are limited if renal function allows Recommendations IV Vancomycin till 10/5/20 Vancomycin dosing per levels given renal issues by pharmacy Hold vancomycin for trough > 15 Long term zyvox use is concerning for bone marrow suppression and also interactions with her other medications such as zyvox ( can cause qt prolongation) Overall, compliacted scenario with limited antibiotic choice ( allergies, intoleraces in past to penicillin, cephalosporins and daptomycin per patient ) She will need weekly labs for CBC with differential and BMP, vancomycin trough, ESR and CRP 
follow-up with me within 2 weeks of discharge Line removal once IV therapy completed Antibiotic side effects/adverse effects/toxicities discussed including gastrointestinal, renal, hematological , ability to lower siezure threshold, risk for C diff infection. Probiotics, daily yogurt encouraged. Antibiotic side effects/adverse effects/toxicities discussed including gastrointestinal, renal, hematological , ability to lower siezure threshold, risk for C diff infection. Probiotics, daily yogurt encouraged. 2) A. fib 3) PARAG Renally dose vancomycin 4) DVT prophylaxis 5) screening hep C antibody negative in March 2020, HIV NR Thank you for the opportunity to participate in the care of this patient. Please contact with questions or concerns.    
 
Jaye Gruber DO 
10:27 AM

## 2020-09-09 NOTE — ROUTINE PROCESS
TRANSFER - OUT REPORT: 
 
Verbal report given to [facility]) on Jose Cardona  being transferred to (unit) for routine progression of care Report consisted of patients Situation, Background, Assessment and  
Recommendations(SBAR). Information from the following report(s) SBAR, Kardex, Procedure Summary, Intake/Output, MAR, Recent Results and Cardiac Rhythm AFIB was reviewed with the receiving nurse. Lines:  
Venous Access Device Proline CT with cuff 09/08/20 (Active) Central Line Being Utilized Yes 09/09/20 1205 Criteria for Appropriate Use Long term IV/antibiotic administration 09/09/20 1205 Site Assessment Clean, dry, & intact 09/09/20 1205 Date of Last Dressing Change 09/08/20 09/08/20 1805 Dressing Status Clean, dry, & intact 09/09/20 1205 Dressing Type Disk with Chlorhexadine gluconate (CHG); Transparent 09/09/20 1205 Action Taken Open ports on tubing capped 09/09/20 0815 Positive Blood Return (Medial Site) Yes 09/09/20 0815 Action Taken (Medial Site) Flushed; Infusing 09/09/20 0815 Positive Blood Return (Lateral Site) Yes 09/09/20 0815 Action Taken (Lateral Site) Flushed 09/09/20 6470 Alcohol Cap Used Yes 09/09/20 0815 Peripheral IV 09/07/20 Anterior; Left Forearm (Active) Site Assessment Clean, dry, & intact 09/09/20 0815 Phlebitis Assessment 0 09/09/20 0815 Infiltration Assessment 0 09/09/20 0815 Dressing Status Clean, dry, & intact 09/09/20 0815 Dressing Type Transparent;Tape 09/09/20 0815 Hub Color/Line Status Infusing 09/09/20 0958 Action Taken Open ports on tubing capped 09/08/20 1805 Alcohol Cap Used Yes 09/09/20 0815 Opportunity for questions and clarification was provided. Patient transported with: 
 Patient-specific medications from Pharmacy

## 2020-09-09 NOTE — PROGRESS NOTES
Occupational Therapy Patient cleared to see but adamantly deferred intervention. She stated, \"I am moving to a new room now and I talked to nursing not to let PT and OT in today. I am not in the mood. \" She has been told of daily discharge for several days; understandable level of frustration. Plan is for IP rehab tomorrow.  Kailyn CABRERA/PATRICIA

## 2020-09-09 NOTE — PROGRESS NOTES
TRANSFER - IN REPORT: 
 
Verbal report received from [address] (name) on Erin Bhatt  being received from [facility] (unit) for routine progression of care Report consisted of patients Situation, Background, Assessment and  
Recommendations(SBAR). Information from the following report(s) SBAR, Kardex, [facility] and Recent Results was reviewed with the receiving nurse. Opportunity for questions and clarification was provided. Assessment completed upon patients arrival to unit and care assumed.

## 2020-09-09 NOTE — PROGRESS NOTES
Pharmacist Note  Warfarin Dosing Consult provided for this 60 y. o.female to manage warfarin for Atrial Fibrillation INR Goal: 2 - 3 Home regimen/ tablet size: 2 mg PO daily Drugs that may increase INR: None Drugs that may decrease INR: None Other current anticoagulants/ drugs that may increase bleeding risk: Sertraline Risk factors: None Daily INR ordered: YES Recent Labs  
  09/09/20 
0440 09/08/20 
0506 09/07/20 
0245 INR 2.6* 2.3* 2.3* Date               INR                  Dose 8/27  --  3 mg  
8/28                1.1                   3 mg 
8/29                1.1                   3 mg 
8/30                1.1                   3 mg 
8/31                1.1                   6 mg 
9/1                  1.3                   5 mg 
9/2                  1.4                   5 mg 
9/3                  1.7                   5 mg 
9/4                  2.4                   Held by MD 
9/5                  2.5                   Held by MD 
9/6                  2.7                   Held by MD 
9/7                  2.3                   Hold 9/8                  2.3                   2.5 mg 
9/9                  2.6                   2 mg Assessment/ Plan: Will order warfarin 2 mg PO x 1 (INR trending up despite 4 days of holding dose)- will decrease back to home dose. Pharmacy will continue to monitor daily and adjust therapy as indicated. Please contact the pharmacist at  for outpatient recommendations if needed.   
 
Anjali Caputo, DeniaD, BCPS

## 2020-09-09 NOTE — PROGRESS NOTES
Infectious Disease Progress Note Subjective: Ms Denilson Garsia seen earlier. S/P Simone catheter placement Denied diarrhea ROS: 10 point ROS obtained and pertinent positives include above. All others negative. Objective: 
 
Vitals:  
Patient Vitals for the past 24 hrs: 
 Temp Pulse Resp BP SpO2  
09/09/20 1118 97.9 °F (36.6 °C) 86 18 104/60 98 % 09/09/20 0953  88  117/63   
09/09/20 0729 97.4 °F (36.3 °C) 87 18 148/77 98 % 09/09/20 0438 98 °F (36.7 °C) 85  138/69 97 % 09/09/20 0011 98 °F (36.7 °C) 90  144/79 96 % 09/08/20 2026 97.8 °F (36.6 °C) 100 18 134/69 100 % 09/08/20 1805 97.5 °F (36.4 °C) 78 18 (!) 167/92 99 % 09/08/20 1729  88 15 135/55 96 % 09/08/20 1712  79 9 121/74 100 % 09/08/20 1707  77 9 129/65 100 % 09/08/20 1702  79 12 140/66 100 % 09/08/20 1657  76 12 136/71 100 % 09/08/20 1516 97.9 °F (36.6 °C) 83 12 132/61 99 % Physical Exam: 
Gen: No apparent distress HEENT:   no scleral icterus, no thrush, CV: S1,2 heard regularly, no lower extremity edema  Drains removed, no jacinta erythema around surgical site , no  discharge seen Lungs: Clear to auscultation bilaterally, Abdomen: soft, non tender,  
Skin: no rash , RLE chronic venous changes to skin Musculoskeletal:  No joint edema, erythema or tenderness noted RLE, + LLE BKA Medications: 
 
Current Facility-Administered Medications:  
  warfarin (COUMADIN) tablet 2 mg, 2 mg, Oral, ONCE, Yobany Palomo MD 
  bacitracin 500 unit/gram ointment, , Topical, TID, Yobany Spann MD 
  vancomycin (VANCOCIN) 1250 mg in  ml infusion, 1,250 mg, IntraVENous, Q48H, Jaye Gruber DO, Last Rate: 125 mL/hr at 09/09/20 0958, 1,250 mg at 09/09/20 8091   diphenhydrAMINE (BENADRYL) capsule 25 mg, 25 mg, Oral, Q6H PRN, Whitney Resendiz MD, 25 mg at 09/05/20 2206   Vancomycin- Pharmacy Dosing, , Other, Rx Dosing/Monitoring, Jaye Gruber, DO 
   potassium chloride SR (KLOR-CON 10) tablet 20 mEq, 20 mEq, Oral, DAILY, Ciera Whitfield MD, 20 mEq at 09/09/20 2269   bumetanide (BUMEX) tablet 0.5 mg, 0.5 mg, Oral, BID, Ciera Whitfield MD, 0.5 mg at 09/09/20 0954 
  HYDROmorphone (DILAUDID) tablet 3 mg, 3 mg, Oral, Q4H PRN, Ciera Whitfield MD, 3 mg at 09/09/20 0814 
  sodium chloride (NS) flush 5-40 mL, 5-40 mL, IntraVENous, Q8H, Hernan Zambrano MD, 10 mL at 09/09/20 5164   sodium chloride (NS) flush 5-40 mL, 5-40 mL, IntraVENous, PRN, Hernan Zambrano MD 
  naloxone [facility]) injection 0.4 mg, 0.4 mg, IntraVENous, PRN, Hernan Zambrano MD 
  ondansetron [facility]) injection 4 mg, 4 mg, IntraVENous, Q4H PRN, Hernan Zambrano MD, 4 mg at 09/09/20 5062   insulin glargine (LANTUS) injection 10 Units, 10 Units, SubCUTAneous, QHS, Ciera Whitfield MD, 10 Units at 09/08/20 2224   Warfarin- Pharmacy Dosing, , Other, Rx Dosing/Monitoring, Ciera Whitfield MD 
  HYDROmorphone (PF) (DILAUDID) injection 0.5 mg, 0.5 mg, IntraVENous, Q3H PRN, Ciera Whitfield MD, 0.5 mg at 09/09/20 1128   nystatin (MYCOSTATIN) 100,000 unit/gram cream, , Topical, BID, Jacinda Morris MD 
  sodium chloride (NS) flush 5-40 mL, 5-40 mL, IntraVENous, Q8H, Bret Layton PA, 10 mL at 09/09/20 1129 
  sodium chloride (NS) flush 5-40 mL, 5-40 mL, IntraVENous, PRN, LORNA Bruce, 10 mL at 09/07/20 1917 
  naloxone (NARCAN) injection 0.1 mg, 0.1 mg, IntraVENous, PRN, LORNA Bruce 
  bisacodyL (DULCOLAX) tablet 10 mg, 10 mg, Oral, DAILY, LORNA Bruce, 10 mg at 08/31/20 2776   hydrALAZINE (APRESOLINE) 20 mg/mL injection 20 mg, 20 mg, IntraVENous, Q6H PRN, Elizabeth Mays MD, 20 mg at 08/28/20 8591   hydrALAZINE (APRESOLINE) tablet 100 mg, 100 mg, Oral, TID, Elizabeth Mays MD, 100 mg at 09/09/20 1500   busPIRone (BUSPAR) tablet 10 mg, 10 mg, Oral, BID, Hernan Zambrano MD, 10 mg at 09/09/20 0227   dilTIAZem ER (CARDIZEM CD) capsule 180 mg, 180 mg, Oral, DAILY, Emile Carranza MD, 180 mg at 09/09/20 7456   sertraline (ZOLOFT) tablet 75 mg, 75 mg, Oral, DAILY, Emile Carranza MD, 75 mg at 09/09/20 0958   acetaminophen (TYLENOL) tablet 650 mg, 650 mg, Oral, Q6H PRN, 650 mg at 09/07/20 2205 **OR** acetaminophen (TYLENOL) suppository 650 mg, 650 mg, Rectal, Q6H PRN, Emile Carranza MD 
  polyethylene glycol (MIRALAX) packet 17 g, 17 g, Oral, DAILY PRN, Emile Carranza MD 
  promethazine (PHENERGAN) tablet 12.5 mg, 12.5 mg, Oral, Q6H PRN **OR** [DISCONTINUED] ondansetron (ZOFRAN) injection 4 mg, 4 mg, IntraVENous, Q6H PRN, Emile Carranza MD 
  glucose chewable tablet 16 g, 4 Tab, Oral, PRN, Kaiden Bearded, Jaylene M, DO 
  glucagon (GLUCAGEN) injection 1 mg, 1 mg, IntraMUSCular, PRN, Kaiden Bearded, Jaylene M, DO 
  dextrose 10% infusion 0-250 mL, 0-250 mL, IntraVENous, PRN, Kaiden Bearded, Heavenly Pine M, DO 
  gabapentin (NEURONTIN) capsule 100 mg, 100 mg, Oral, TID PRN, Marc Sea M, DO, 100 mg at 09/09/20 0814 
  insulin regular (NOVOLIN R, HUMULIN R) injection, , SubCUTAneous, AC&HS, Marc Sea M, DO, 2 Units at 09/09/20 1127 Labs: 
Recent Results (from the past 24 hour(s)) GLUCOSE, POC Collection Time: 09/08/20  6:09 PM  
Result Value Ref Range Glucose (POC) 89 65 - 100 mg/dL Performed by Molly Lantigua GLUCOSE, POC Collection Time: 09/08/20  9:51 PM  
Result Value Ref Range Glucose (POC) 261 (H) 65 - 100 mg/dL Performed by Cecy Mandel PROTHROMBIN TIME + INR Collection Time: 09/09/20  4:40 AM  
Result Value Ref Range INR 2.6 (H) 0.9 - 1.1 Prothrombin time 24.7 (H) 9.0 - 11.1 sec METABOLIC PANEL, BASIC Collection Time: 09/09/20  4:40 AM  
Result Value Ref Range Sodium 135 (L) 136 - 145 mmol/L Potassium 4.7 3.5 - 5.1 mmol/L Chloride 106 97 - 108 mmol/L  
 CO2 23 21 - 32 mmol/L  Anion gap 6 5 - 15 mmol/L  
 Glucose 113 (H) 65 - 100 mg/dL BUN 24 (H) 6 - 20 MG/DL Creatinine 1.93 (H) 0.55 - 1.02 MG/DL  
 BUN/Creatinine ratio 12 12 - 20 GFR est AA 32 (L) >60 ml/min/1.73m2 GFR est non-AA 26 (L) >60 ml/min/1.73m2 Calcium 9.2 8.5 - 10.1 MG/DL  
GLUCOSE, POC Collection Time: 09/09/20  7:30 AM  
Result Value Ref Range Glucose (POC) 76 65 - 100 mg/dL Performed by Dangelo Duran GLUCOSE, POC Collection Time: 09/09/20  8:13 AM  
Result Value Ref Range Glucose (POC) 155 (H) 65 - 100 mg/dL Performed by Austin Manzano SARS-COV-2 Collection Time: 09/09/20  9:59 AM  
Result Value Ref Range Specimen source Nasopharyngeal    
 SARS-CoV-2 PENDING   
 SARS-CoV-2 PENDING Specimen source Nasopharyngeal    
 COVID-19 rapid test PENDING Specimen type NP Swab Health status PENDING   
 COVID-19 PENDING   
GLUCOSE, POC Collection Time: 09/09/20 11:18 AM  
Result Value Ref Range Glucose (POC) 177 (H) 65 - 100 mg/dL Performed by Dangelo Duran Micro:  
 
Wound 8/25/20 Specimen Information:  Tissue STERNUM   
     
Component  Value  Ref Range & Units  Status Special Requests:  MANUBRIUM   Final   
GRAM STAIN  RARE WBCS SEEN     Final   
GRAM STAIN  NO ORGANISMS SEEN     Final   
Culture result: Abnormal        Final   
LIGHT STAPHYLOCOCCUS AUREUS Susceptibility Staphylococcus aureus BRITTON Ciprofloxacin ($)  <=0.5 ug/mL  S Clindamycin ($)  0.25 ug/mL  S Daptomycin ($$$$$)  0.25 ug/mL  S Doxycycline ($$)  <=0.5 ug/mL  S Erythromycin ($$$$)  <=0.25 ug/mL  S Gentamicin ($)  <=0.5 ug/mL  S Levofloxacin ($)  <=0.12 ug/mL  S Linezolid ($$$$$)  2 ug/mL  S Moxifloxacin ($$$$)  <=0.25 ug/mL  S Oxacillin  <=0.25 ug/mL  S Rifampin ($$$$)  <=0.5 ug/mL  S1 Tetracycline  <=1 ug/mL  S Trimeth/Sulfa  <=10 ug/mL  S Vancomycin ($)  1 ug/mL  S Blood: 8/20/20 Specimen Information:  Blood   
     
 Component  Value  Ref Range & Units  Status Special Requests:  NO SPECIAL REQUESTS     Preliminary Culture result:  NO GROWTH 4 DAYS Wound Sternoclavicular joint Tissue RIGHT STERNOCLAVICULAR JOINT A   
     
Component  Value  Ref Range & Units  Status Special Requests:  NO SPECIAL REQUESTS     Final   
GRAM STAIN  NO WBC'S SEEN     Final   
GRAM STAIN  NO ORGANISMS SEEN     Final   
Culture result: Abnormal        Final   
HEAVY STAPHYLOCOCCUS AUREUS Susceptibility Staphylococcus aureus BRITTON Ciprofloxacin ($)  <=0.5 ug/mL  S Clindamycin ($)  0.25 ug/mL  S Daptomycin ($$$$$)  0.25 ug/mL  S Doxycycline ($$)  <=0.5 ug/mL  S Erythromycin ($$$$)  <=0.25 ug/mL  S Gentamicin ($)  <=0.5 ug/mL  S Levofloxacin ($)  <=0.12 ug/mL  S Linezolid ($$$$$)  2 ug/mL  S Moxifloxacin ($$$$)  <=0.25 ug/mL  S Oxacillin  <=0.25 ug/mL  S Rifampin ($$$$)  <=0.5 ug/mL  S1 Tetracycline  <=1 ug/mL  S Trimeth/Sulfa  <=10 ug/mL  S Vancomycin ($)  1 ug/mL  S Component  Value  Ref Range & Units  Status Special Requests:    Final   
RIGHT STERNOCLAVICULAR JOINT ABSCESS Culture result:  NO ANAEROBES ISOLATED     Final   
Result History Tissue RIGHT STERNOCLAVICULAR JOINT A   
     
Component  Value  Ref Range & Units  Status Special Requests:  NO SPECIAL REQUESTS     Final   
GRAM STAIN  NO WBC'S SEEN     Final   
GRAM STAIN  NO ORGANISMS SEEN     Final   
Culture result: Abnormal        Final   
HEAVY STAPHYLOCOCCUS AUREUS Susceptibility Staphylococcus aureus BRITTON Ciprofloxacin ($)  <=0.5 ug/mL  S Clindamycin ($)  0.25 ug/mL  S Daptomycin ($$$$$)  0.25 ug/mL  S Doxycycline ($$)  <=0.5 ug/mL  S Erythromycin ($$$$)  <=0.25 ug/mL  S Gentamicin ($)  <=0.5 ug/mL  S Levofloxacin ($)  <=0.12 ug/mL  S Linezolid ($$$$$)  2 ug/mL  S Moxifloxacin ($$$$)  <=0.25 ug/mL  S    
 Oxacillin  <=0.25 ug/mL  S Rifampin ($$$$)  <=0.5 ug/mL  S1 Tetracycline  <=1 ug/mL  S Trimeth/Sulfa  <=10 ug/mL  S Vancomycin ($)  1 ug/mL  S Component  Value  Ref Range & Units  Status Special Requests:    Final   
RIGHT STERNOCLAVICULAR JOINT ABSCESS Culture result:  NO ANAEROBES ISOLATED     Final   
Result History CULTURE, ANAEROBIC on 8/22/2020 Imaging: 
CT chest 8/19/20 
  
FINDINGS: 
  
CHEST WALL: No mass or axillary lymphadenopathy. THYROID: No nodule. MEDIASTINUM: No mass or lymphadenopathy. ASHLYN: No mass or lymphadenopathy. THORACIC AORTA: No aneurysm. MAIN PULMONARY ARTERY: Normal in caliber. TRACHEA/BRONCHI: Patent. ESOPHAGUS: No wall thickening or dilatation. HEART: Coronary artery calcifications are present. PLEURA: No effusion or pneumothorax. LUNGS: No nodule, mass, or airspace disease. INCIDENTALLY IMAGED UPPER ABDOMEN: There is a right adrenal adenoma measuring 2.8 cm. BONES: There is inflammation involving the right sternoclavicular joint. There 
is no focal fluid collection. 
  
IMPRESSION IMPRESSION: 
Suspect septic arthritis of the right sternoclavicular joint. No drainable fluid Collection. TTE 8/20/2020 · LV: Estimated LVEF is 55 - 60%. Visually measured ejection fraction. Normal cavity size and systolic function (ejection fraction normal). Mild concentric hypertrophy. Wall motion: normal. Normal left ventricular strain. · LA: Moderately dilated left atrium. · AV: Aortic valve leaflet calcification present without reduced excursion. · MV: Mitral valve non-specific thickening. Echo Findings Left Ventricle  Normal cavity size and systolic function (ejection fraction normal). Mild concentric hypertrophy. Wall motion: normal. The estimated EF is 55 - 60%. Visually measured ejection fraction. Normal left ventricular strain. Left Atrium  Moderately dilated left atrium. Right Ventricle  Normal cavity size and global systolic function. Right Atrium  Normal cavity size. Aortic Valve  No stenosis and no regurgitation. There is leaflet calcification without reduced excursion. Mitral Valve  No stenosis. Mitral valve non-specific thickening. Trace regurgitation. Tricuspid Valve  Normal valve structure, no stenosis and no regurgitation. Pulmonic Valve  Pulmonic valve not well visualized. Aorta  Normal aortic root. Pericardium  No evidence of pericardial effusion. Assessment / Plan Ms. Minnie Sena is a 60-year-old lady with a history of diabetes, hypertension, A. 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 
who was transferred from [address] [place] [address] for right sternoclavicular joint osteomyelitis//septic joint. She underwent incision and debridement of the right sternoclavicular joint, resection of the middle third of the right clavicle and placement of wound VAC on 8/20/2020. Operative findings include \" jacinta purulence within the [place] joint which was eroded\" Her blood culture on 8/20/2020 was negative but wound cultures from [place] joint debridement were positive for MSSA She is currently on IV vancomycin She reports a history of myalgias and muscle issues while on daptomycin previously She lists an allergy to antibiotics including Bactrim as well as penicillin. She had hives with penicillin. Cephalosporin was attempted and she says she passed out almost and had no idea what was happening to her when she received cefepime in the past. 
 
1) sternoclavicular joint abscess, osteomyelitis with cx + for MSSA History of MSSA bacteremia, pacer vegetation status post removal and endocarditis treatment in April to May 2020 Left foot osteomyelitis status post amputation/BKA May 2020 I discussed with the patient that vancomycin is  not the best medication for MSSA. However given her issues with antibiotics in the past to cephalosporins, penicillin and daptomycin,  we will continue with IV vancomycin as choices are limited if renal function allows Recommendations IV Vancomycin till 10/5/20. Currently on 1250 mg Q 48 hours Vancomycin dosing per levels given renal issues by pharmacy Long term zyvox use is concerning for bone marrow suppression and also interactions with her other medications such as zyvox ( can cause qt prolongation) Overall, compliacted scenario with limited antibiotic choice ( allergies, intoleraces in past to penicillin, cephalosporins and daptomycin per patient ) She will need weekly labs for CBC with differential and BMP, vancomycin trough, ESR and CRP 
follow-up with me within 2 weeks of discharge Simone Line removal once IV therapy completed Antibiotic side effects/adverse effects/toxicities discussed including gastrointestinal, renal, hematological , ability to lower siezure threshold, risk for C diff infection. Probiotics, daily yogurt encouraged. Antibiotic side effects/adverse effects/toxicities discussed including gastrointestinal, renal, hematological , ability to lower siezure threshold, risk for C diff infection. Probiotics, daily yogurt encouraged. 2) A. fib 3) PARAG Renally dose vancomycin 4) DVT prophylaxis 5) screening hep C antibody negative in March 2020, HIV NR Thank you for the opportunity to participate in the care of this patient. Please contact with questions or concerns.    
 
Jaye Gruber,  
3:13 PM

## 2020-09-09 NOTE — PROGRESS NOTES
9/9/2020 - 
TEE: 
- RUR: 37% 
- Disposition is for discharge to [facility] 
- [facility] is on will call - Patient has been cleared for discharge today, 9/9 
 
08:35 -  
CM contacted ARLETTE Khoa Martínez: [phone]) and left a  requesting a return call. CRM: Regina Mar MPH, CH[facility]; Z: [phone] 
 
08:55 -  
CM received return call from [facility]. Patient's ins re-auth is pending. Patient needs a repeat COVID due to last test being 6days old. Per Darell Archibald test can be a rapid test.  Attending is aware of the above. CRM: Regina Mar MPH, CHES; Z: [phone] 
 
14:25 -  
[facility] has obtained patient's re-auth. Per nursing, [facility] does not have the rapid COVID testing agent available at this time. Processing of patient's COVID test will likely take 48 hours. CRM: Regina Mar, MPH, [address] [place] [address]; Z: [phone]

## 2020-09-09 NOTE — PROGRESS NOTES
[address] [facility] Adult  Hospitalist Group Hospitalist Progress Note Guanaco Ugalde MD 
Answering service: [phone] OR [phone] from in house phone NAME:  Alma Law :  1960 MRN:  [id number] Admission Summary: A 60year old female with past medical history HTN, DM Type II on insulin, Atrial fibrillation/flutter, CKD stage III, recent admission 2020-2020 for left foot cellulitis/OM s/p left BKA, bacteremia MSSA, endocarditis, Pacemaker vegetation s/p lead extraction, presenting to [facility] as a direct transfer from [facility] emergency department for thoracic surgery evaluation.  Patient developed right-sided chest pain/tenderness radiating to right shoulder.  Seen at urgent care and found to have atrial fibrillation with RVR. Sent to the emergency department for further evaluation.  In the ED, CT chest demonstrated septic arthritis of the right sternal clavicular joint and no abscess.  Given Merrem and vancomycin and transferred to [facility] 2020 
  
Interval history / Subjective:  
 
Patient seen and examined at the bedside. Patient is ready for discharge today INR is therapeutic Patient got her home catheter yesterday site clean dry Discussed with  sheltering arms is now asking for repeat Andrey test last one done  Rapid test ordered may be able to discharge today if authorization and rapid test result available Discussed with the patient as well Assessment & Plan:  
 
Sepsis secondary to septic right sternoclavicular joint and abscess- resolved - Recent complicated hospitalization with MSSA bacteremia/endocarditis - Incision and debridement of right sternoclavicular joint.  Resection of medial third of right clavicle.  Placement of wound VAC on  
- OR cx: MSSA - continue IV vancomycin - plan is for 6 weeks until 10/5/2020.  
- Will need to see ID within 2 weeks of d/c. To Dr. Joseph Westbrook for review - pain control with dilaudid - ID consulted, appreciate melvin Nichols cath in place  
  
Atrial fibrillation with RVR 
- rate controlled, off Cardizem drip, continue with oral diltiazem 
- HR improved and < 100  
- restarted on warfarin-INR 2.6 Hx of pacemaker lead vegetation and s/p removal of device 
-stable, continue cardiac monitoring 
  
PARAG on CKD stage III  
- creatinine starting to rise, was on bank and also diuresed 6 now creatinine stable 
- continue decreased bumex dosing - now at 0.5 BID from 2 mg daily 
- renally dose vanc ID and pharmacy following 
  
T2DM, controlled: Episodes of hyperglycemia noted  
-continue long-acting, continue SSI. A1c 6.1, monitor finger stick glucose 
  
HTN  
- BP stable on hydralazine 
  
Depression 
-stable, continue buspirone, Zoloft Vaginal yeast infection  
-continue nystatin Code status: Full Code DVT prophylaxis: SCD, on coumadin, pharmacy to dose Care Plan discussed with: Patient/Family, Nurse and  Anticipated Disposition: TBD Anticipated Discharge: Today or tomorrow plan as above authorization and repeat COVID test to [facility] Problems  Date Reviewed: 8/24/2020 None Vital Signs:  
 Last 24hrs VS reviewed since prior progress note. Most recent are: 
Visit Vitals /63 Pulse 88 Temp 97.4 °F (36.3 °C) Resp 18 Ht 5' 10\" (1.778 m) Wt 101.5 kg (223 lb 12.3 oz) SpO2 98% BMI 32.11 kg/m² Intake/Output Summary (Last 24 hours) at 9/9/2020 1031 Last data filed at 9/9/2020 3576 Gross per 24 hour Intake 600 ml Output 2225 ml Net -1625 ml Physical Examination:  
 
Constitutional:  No acute distress, cooperative, pleasant ENT:  Oral mucosa moist, oropharynx benign. Resp:  CTA bilaterally. No wheezing/rhonchi/rales.  No accessory muscle use [facility] cath+ surgical site c/d CV:  Regular rhythm, normal rate, no murmurs, gallops, rubs GI:  Soft, non distended, non tender. normoactive bowel sounds, no hepatosplenomegaly Musculoskeletal:  No edema, warm, Neurologic:  L BKA, R arm in sling. AAOx3, cranial nerves grossly normal, normal speech rate and rhythm Data Review:  
 Review and/or order of clinical lab test 
Review and/or order of tests in the radiology section of CPT Review and/or order of tests in the medicine section of CPT Ir Insert Tunl Cvc W/o Port Over 5 [address] Result Date: 9/8/2020 IMPRESSION: Successful placement of left internal jugular tunneled long-term central venous catheter. There catheter is ready for immediate use. Labs:  
 
No results for input(s): WBC, HGB, HCT, PLT, HGBEXT, HCTEXT, PLTEXT, HGBEXT, HCTEXT, PLTEXT in the last 72 hours. Recent Labs  
  09/09/20 0440 09/08/20 
0506 09/07/20 
0245 * 137 136  
K 4.7 4.2 4.4  
 108 105 CO2 23 24 25 BUN 24* 25* 26* CREA 1.93* 1.92* 1.97* * 74 87  
CA 9.2 9.0 8.6 No results for input(s): ALT, AP, TBIL, TBILI, TP, ALB, GLOB, GGT, AML, LPSE in the last 72 hours. No lab exists for component: SGOT, GPT, AMYP, HLPSE Recent Labs  
  09/09/20 0440 09/08/20 
0506 09/07/20 
0245 INR 2.6* 2.3* 2.3* PTP 24.7* 21.9* 22.6* No results for input(s): FE, TIBC, PSAT, FERR in the last 72 hours. Lab Results Component Value Date/Time Folate 8.5 03/14/2020 02:49 PM  
  
No results for input(s): PH, PCO2, PO2 in the last 72 hours. No results for input(s): CPK, CKNDX, TROIQ in the last 72 hours. No lab exists for component: CPKMB Lab Results Component Value Date/Time Cholesterol, total 141 11/11/2019 08:47 AM  
 HDL Cholesterol 32 (L) 11/11/2019 08:47 AM  
 LDL, calculated 82 11/11/2019 08:47 AM  
 Triglyceride 137 11/11/2019 08:47 AM  
 
Lab Results Component Value Date/Time  Glucose (POC) 155 (H) 09/09/2020 08:13 AM  
 Glucose (POC) 76 09/09/2020 07:30 AM  
 Glucose (POC) 261 (H) 09/08/2020 09:51 PM  
 Glucose (POC) 89 09/08/2020 06:09 PM  
 Glucose (POC) 91 09/08/2020 11:15 AM  
 
Lab Results Component Value Date/Time Color YELLOW/STRAW 05/23/2020 02:35 PM  
 Appearance CLOUDY (A) 05/23/2020 02:35 PM  
 Specific gravity 1.009 05/23/2020 02:35 PM  
 pH (UA) 6.0 05/23/2020 02:35 PM  
 Protein Negative 05/23/2020 02:35 PM  
 Glucose Negative 05/23/2020 02:35 PM  
 Ketone Negative 05/23/2020 02:35 PM  
 Bilirubin Negative 05/23/2020 02:35 PM  
 Urobilinogen 0.2 05/23/2020 02:35 PM  
 Nitrites Positive (A) 05/23/2020 02:35 PM  
 Leukocyte Esterase LARGE (A) 05/23/2020 02:35 PM  
 Epithelial cells FEW 05/23/2020 02:35 PM  
 Bacteria 4+ (A) 05/23/2020 02:35 PM  
 WBC >100 (H) 05/23/2020 02:35 PM  
 RBC 0-5 05/23/2020 02:35 PM  
 
 
 
Medications Reviewed:  
 
Current Facility-Administered Medications Medication Dose Route Frequency  warfarin (COUMADIN) tablet 2 mg  2 mg Oral ONCE  
 bacitracin 500 unit/gram ointment   Topical TID  vancomycin (VANCOCIN) 1250 mg in  ml infusion  1,250 mg IntraVENous Q48H  
 diphenhydrAMINE (BENADRYL) capsule 25 mg  25 mg Oral Q6H PRN  Vancomycin- Pharmacy Dosing   Other Rx Dosing/Monitoring  potassium chloride SR (KLOR-CON 10) tablet 20 mEq  20 mEq Oral DAILY  bumetanide (BUMEX) tablet 0.5 mg  0.5 mg Oral BID  
 HYDROmorphone (DILAUDID) tablet 3 mg  3 mg Oral Q4H PRN  
 sodium chloride (NS) flush 5-40 mL  5-40 mL IntraVENous Q8H  
 sodium chloride (NS) flush 5-40 mL  5-40 mL IntraVENous PRN  
 naloxone (NARCAN) injection 0.4 mg  0.4 mg IntraVENous PRN  
 ondansetron (ZOFRAN) injection 4 mg  4 mg IntraVENous Q4H PRN  
 insulin glargine (LANTUS) injection 10 Units  10 Units SubCUTAneous QHS  Warfarin- Pharmacy Dosing   Other Rx Dosing/Monitoring  HYDROmorphone (PF) (DILAUDID) injection 0.5 mg  0.5 mg IntraVENous Q3H PRN  
  nystatin (MYCOSTATIN) 100,000 unit/gram cream   Topical BID  sodium chloride (NS) flush 5-40 mL  5-40 mL IntraVENous Q8H  
 sodium chloride (NS) flush 5-40 mL  5-40 mL IntraVENous PRN  
 naloxone (NARCAN) injection 0.1 mg  0.1 mg IntraVENous PRN  
 bisacodyL (DULCOLAX) tablet 10 mg  10 mg Oral DAILY  hydrALAZINE (APRESOLINE) 20 mg/mL injection 20 mg  20 mg IntraVENous Q6H PRN  
 hydrALAZINE (APRESOLINE) tablet 100 mg  100 mg Oral TID  busPIRone (BUSPAR) tablet 10 mg  10 mg Oral BID  dilTIAZem ER (CARDIZEM CD) capsule 180 mg  180 mg Oral DAILY  sertraline (ZOLOFT) tablet 75 mg  75 mg Oral DAILY  acetaminophen (TYLENOL) tablet 650 mg  650 mg Oral Q6H PRN Or  
 acetaminophen (TYLENOL) suppository 650 mg  650 mg Rectal Q6H PRN  polyethylene glycol (MIRALAX) packet 17 g  17 g Oral DAILY PRN  promethazine (PHENERGAN) tablet 12.5 mg  12.5 mg Oral Q6H PRN  
 glucose chewable tablet 16 g  4 Tab Oral PRN  
 glucagon (GLUCAGEN) injection 1 mg  1 mg IntraMUSCular PRN  
 dextrose 10% infusion 0-250 mL  0-250 mL IntraVENous PRN  
 gabapentin (NEURONTIN) capsule 100 mg  100 mg Oral TID PRN  
 insulin regular (NOVOLIN R, HUMULIN R) injection   SubCUTAneous AC&HS  
 
______________________________________________________________________ EXPECTED LENGTH OF STAY: 3d 17h ACTUAL LENGTH OF STAY:          20 
 
            
Arun Jaffe MD

## 2020-09-09 NOTE — PROGRESS NOTES
Problem: Risk for Spread of Infection Goal: Prevent transmission of infectious organism to others Description: Prevent the transmission of infectious organisms to other patients, staff members, and visitors. Outcome: Progressing Towards Goal 
  
Problem: Falls - Risk of 
Goal: *Absence of Falls Description: Document Merry Katie Fall Risk and appropriate interventions in the flowsheet. Outcome: Progressing Towards Goal 
Note: Fall Risk Interventions: 
Mobility Interventions: OT consult for ADLs, PT Consult for mobility concerns, Patient to call before getting OOB Medication Interventions: Patient to call before getting OOB, Teach patient to arise slowly Elimination Interventions: Call light in reach, Toileting schedule/hourly rounds, Stay With Me (per policy) Problem: Pain Goal: *Control of Pain Outcome: Progressing Towards Goal 
  
Problem: Pressure Injury - Risk of 
Goal: *Prevention of pressure injury Description: Document Valdemar Scale and appropriate interventions in the flowsheet. Outcome: Progressing Towards Goal 
Note: Pressure Injury Interventions: 
Sensory Interventions: Assess changes in LOC, Assess need for specialty bed, Keep linens dry and wrinkle-free, Maintain/enhance activity level, Minimize linen layers Moisture Interventions: Absorbent underpads, Internal/External urinary devices Activity Interventions: PT/OT evaluation Mobility Interventions: PT/OT evaluation, Pressure redistribution bed/mattress (bed type) Nutrition Interventions: Document food/fluid/supplement intake Friction and Shear Interventions: Minimize layers

## 2020-09-09 NOTE — PROGRESS NOTES
Physical Therapy Note: 
Patient cleared to see but adamantly deferred intervention. She stated, \"I am moving to a new room now and I talked to nursing not to let PT and OT in today. I am not in the mood. \" 
Plan is for IP rehab tomorrow.  
Yasmany Frey, PT, DPT

## 2020-09-10 NOTE — PROGRESS NOTES
Problem: Self Care Deficits Care Plan (Adult) Goal: *Acute Goals and Plan of Care (Insert Text) Description:  
FUNCTIONAL STATUS PRIOR TO ADMISSION: Patient was MOD I for ADLs/iADLs. With L BKA. Using prosthetic for ADL related mobility with walker PTA. Reports she had been home about a month after rehab stay at [facility] prior to this admission. HOME SUPPORT: The patient lived with  but did not require assistance for basic ADLs Occupational Therapy Goals Goals reviewed and updated: 9/8/2020 1. Patient will perform upper body dressing with supervision within 7 day(s). 2.  Patient will perform lower body dressing with moderate assistance  within 7 day(s). 3.  Patient will perform toilet transfers with CGA and appropriate DME to [facility] within 7 day(s). 4.  Patient will perform all aspects of toileting with moderate assistance within 7 day(s). 5.  Patient will participate in upper extremity therapeutic exercise/activities with supervision as tolerated for 15 minutes within 7 day(s). 6.  Patient will utilize energy conservation techniques during functional activities with verbal cues within 7 day(s). 7.  Patient will demonstrate bilateral UE HEP with supervision within 7 days Weekly Re-Assessment 9/1/2020, goals modified below Initiated 8/25/2020 1. Patient will perform grooming seated EOB with moderate assistance within 7 day(s). MODIFIED to utilizing RUE for at least 60% of task 9/1. MET 2. Patient will perform upper body dressing with minimal assistance/contact guard assist within 7 day(s). MET, UPGRADED to supervision 9/1 3. Patient will perform lower body dressing with moderate assistance  within 7 day(s). CONTINUE 4.  Patient will perform toilet transfers with moderate assistance within 7 day(s). MET, MODIFIED to transfer to/from [facility] with Mod A x1 and least restrictive DME 9/1 MET 5.   Patient will perform all aspects of toileting with moderate assistance within 7 day(s). CONTINUE 6. Patient will participate in upper extremity therapeutic exercise/activities with moderate assistance as tolerated for 15 minutes within 7 day(s). CONTINUE 7. Patient will utilize energy conservation techniques during functional activities with verbal cues within 7 day(s). CONTINUE. MET Outcome: Progressing Towards Goal 
 OCCUPATIONAL THERAPY TREATMENT Patient: Marvin Fung (71 y.o. female) Date: 9/10/2020 Diagnosis: Sepsis ([facility] [address] [A41.9] Sepsis ([facility] [address] Procedure(s) (LRB): 
RIGHT CHEST WOUND RE EXPLORATION, DELAYED CLOSURE (URGENT) (Right) 17 Days Post-Op Precautions: Fall, Contact(NWB RUE) Chart, occupational therapy assessment, plan of care, and goals were reviewed. ASSESSMENT Patient continues with skilled OT services and is progressing towards goals. Patient receptive to encouragement that she find her own motivation despite this current illness and all of the setbacks related  this current admission and plan for rehabilitation. Plans to \"pretend I am already at rehab and try to work out from my bed as much as I can. \" Current Level of Function Impacting Discharge (ADLs): set up-min/mod A UE ADL depending on pain level R shoulder region; can be S-min A when pain well managed; min/mod A when pain at its worst. Mod/max LE ADLs, limited by standing balance, 1 handed use of hattie walker with poor functional use R UE in standing Other factors to consider for discharge: lives with overly supportive spouse per patient; \"we fight a lot because he tries to do too much. \" (training provided re stress and negative effects it has on healing process. PLAN : 
Patient continues to benefit from skilled intervention to address the above impairments. Continue treatment per established plan of care. to address goals. Recommend with staff: out of bed to recliner 2 hours max Recommend next OT session: L blue theraband review; MIGUEL MCPHERSON; ADLs with pain management Recommendation for discharge: (in order for the patient to meet his/her long term goals) Therapy 3 hours per day 5-7 days per week This discharge recommendation: 
Has been made in collaboration with the attending provider and/or case management IF patient discharges home will need the following DME: AE: long handled dressing, bedside commode, gait belt, hospital bed, transfer bench, walker: rolling, and wheelchair SUBJECTIVE:  
Patient stated I'm going to loose the rehab bed waiting on this dumb test, then they will need another test. It's a never ending cycle while I lay here and wait. Evie Bowser OBJECTIVE DATA SUMMARY:  
Cognitive/Behavioral Status: 
Neurologic State: Alert; Appropriate for age Orientation Level: Appropriate for age;Oriented X4 Cognition: Appropriate decision making; Appropriate for age attention/concentration; Appropriate safety awareness; Follows commands Perception: Appears intact Perseveration: No perseveration noted Safety/Judgement: Decreased insight into deficits; Fall prevention; Awareness of environment(much improved) Functional Mobility and Transfers for ADLs: per PT Bed Mobility: 
Rolling: Moderate assistance Supine to Sit: Minimum assistance Sit to Supine: Minimum assistance Transfers: 
Sit to Stand: Moderate assistance(3 attempts) Functional Transfers Toilet Transfer : Moderate assistance(bedside commode ; hattie walker) Balance: 
Sitting: Impaired; Without support Sitting - Static: Good (unsupported) Sitting - Dynamic: Good (unsupported) Standing: Impaired; With support Standing - Static: Fair;Poor;Constant support Standing - Dynamic : Fair;Constant support ADL Intervention: 
Feeding Feeding Assistance: Modified independent Grooming Grooming Assistance: Set-up Upper Body Bathing Bathing Assistance: Minimum assistance(increased time with R UE proximal pain) Lower Body Bathing Bathing Assistance: Moderate assistance Upper Body Dressing Assistance Dressing Assistance: Minimum assistance; Moderate assistance(easier to don than doff with P proximal pain; improved) Lower Body Dressing Assistance Dressing Assistance: Moderate assistance;Maximum assistance Toileting Toileting Assistance: Maximum assistance Cognitive Retraining Attention to Task: Single task Maintains Attention For (Time): Greater than 10 minutes Following Commands: Awareness of environment; Follows two step commands/directions Safety/Judgement: Decreased insight into deficits; Fall prevention; Awareness of environment(much improved) Therapeutic Exercises:  
Able to verbalize understanding of health benefit of consistent participation HEP: she gives herself a \"C\" grade  on an A-F grading scale, stating her motivation has been deflated due to frustration over rehab delay due to covid testing needs; she states she can be an \"A student\" and that she can do better. Pain: 
9 /10 R shoulder with AROM; sling still worn for comfort; encouraged patient to ask MD for needed updates of AROM and WBS orders regarding R UE use Activity Tolerance:  
Fair, Poor, requires rest breaks, requires frequent rest breaks, and breaks related to R shoulder pain Please refer to the flowsheet for vital signs taken during this treatment. After treatment patient left in no apparent distress:  
Supine in bed, Heels elevated for pressure relief, Call bell within reach, Caregiver / family present, and Side rails x 3 
 
COMMUNICATION/COLLABORATION:  
The patients plan of care was discussed with: Registered nurse. Marrianne Sever OTR/L Time Calculation: 25 mins

## 2020-09-10 NOTE — PROGRESS NOTES
Problem: Mobility Impaired (Adult and Pediatric) Goal: *Acute Goals and Plan of Care (Insert Text) Description: FUNCTIONAL STATUS PRIOR TO ADMISSION: Patient was modified independent using a rolling walker and and L BKA prosthesis for functional mobility. HOME SUPPORT PRIOR TO ADMISSION: The patient lived with family but did not require assist. 
Physical Therapy Goals Reviewed 9/3/2020, goals remain appropriate 9/10/2020 1. Patient will move from supine to sit and sit to supine , scoot up and down, and roll side to side in bed with minimal assistance/contact guard assist within 7 day(s). 2.  Patient will transfer from bed to chair and chair to bed with minimal assistance/contact guard assist using the least restrictive device within 7 day(s). 3.  Patient will perform sit to stand with minimal assistance/contact guard assist within 7 day(s). 4. Patient will ambulate with min assist/contact guard for 50 feet wearing prothesis with least restrictive device within 7 days Physical Therapy Goals Initiated 8/23/2020 1. Patient will move from supine to sit and sit to supine , scoot up and down, and roll side to side in bed with minimal assistance/contact guard assist within 7 day(s). 2.  Patient will transfer from bed to chair and chair to bed with minimal assistance/contact guard assist using the least restrictive device within 7 day(s). 3.  Patient will perform sit to stand with minimal assistance/contact guard assist within 7 day(s). 4.  Patient will ambulate with minimal assistance/contact guard assist for 10 feet with the least restrictive device within 7 day(s). 9/10/2020 1309 by Kishan Arevalo PT, DPT Outcome: Progressing Towards Goal 
9/10/2020 1305 by Kishan Arevalo PT, DPT Outcome: Progressing Towards Goal 
 PHYSICAL THERAPY TREATMENT: WEEKLY REASSESSMENT Patient: Thi Virgen (51 y.o. female) Date: 9/10/2020 Primary Diagnosis: Sepsis ([address] [A41.9] Procedure(s) (LRB): 
RIGHT CHEST WOUND RE EXPLORATION, DELAYED CLOSURE (URGENT) (Right) 17 Days Post-Op Precautions:   Fall, Contact(NWB RUE) ASSESSMENT Patient continues with skilled PT services and is progressing towards goals. She c/o right shoulder pain and lack of balance today requiring 2-3 attempts to acquire her standing balance at bedside. Attempted to mobilize in her RUE sling but she deferred use when challenged with balance. Gait training completed in the room using a RW x25' using the RUE for balance only. Gait with increased lateral sway and an anterior LOB requiring minimal assist to recover. She c/o a wet depends when returning to bed and was challenged with static standing balance while replacing her undergarments. Continue to recommend discharge to IP rehab d/t decreased endurance, balance, and increased fall risk following a long and complicated hosptial stay. Patient's progression toward goals since last assessment: improved gait distances, slowly improving overall activity toerlance Current Level of Function Impacting Discharge (mobility/balance): mod assist sit to stand PLAN : 
Goals have been updated based on progression since last assessment. Patient continues to benefit from skilled intervention to address the above impairments. Recommendations and Planned Interventions: bed mobility training, transfer training, gait training, therapeutic exercises, and neuromuscular re-education Frequency/Duration: Patient will be followed by physical therapy:  5 times a week to address goals. Recommendation for discharge: (in order for the patient to meet his/her long term goals) Therapy 3 hours per day 5-7 days per week IF patient discharges home will need the following DME: to be determined (TBD) SUBJECTIVE:  
Patient stated I just want to get home.  OBJECTIVE DATA SUMMARY:  
HISTORY:   
Past Medical History:  
Diagnosis Date Acquired lymphedema Right arm Arrhythmia Asthma Atrial fibrillation ([facility] [address] Dr. Kin Gibbons and Dr. Lisa Grover  
 Atrial flutter [facility]) Cancer ([address] bilateral breast  
 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  
Procedure Laterality Date ABDOMEN SURGERY PROC UNLISTED    
 gastric bypass GASTRIC BYPASS,OBESE<150CM SAW-EN-Y  7/08  
 [place] HX AFIB ABLATION    
 HX BREAST RECONSTRUCTION Bilateral   
 HX CARPAL TUNNEL RELEASE    
 HX CERVICAL FUSION  1994 [address] [facility] [address] RIGHT MODIFIED RADICAL   
 HX MASTECTOMY Left   
 no lymphnode removal  
 HX MOHS PROCEDURES  1995  
 right HX ORTHOPAEDIC Right   
 knee surgery HX PACEMAKER    
 HX PACEMAKER    
 IR INSERT TUNL CVC W PORT OVER 5 YEARS    
 IR INSERT TUNL CVC W/O PORT OVER 5 YR  5/4/2020 IR INSERT TUNL CVC W/O PORT OVER 5 YR  9/8/2020 IR REMOVE TUNL CVAD W/O PORT / PUMP  6/26/2020 [address] NEEDLE BIOPSY LIVER,W OTHR [address]  8.21.07  
 NE [address] AND 1994 NE RELOCATION OF SKIN POCKET FOR PACEMAKER N/A 4/24/2020 RELOCATE 2 [facility] performed by Forest Guerrero MD at [facility] CARDIAC CATH LAB  
 NE RMVL TRANSVNS PM ELTRD 1 LEAD SYS ATR/VENTR N/A 4/24/2020  Remove Pacemaker Dual Leads performed by Forest Guerrero MD at [facility] CARDIAC CATH LAB  
 [place] RMVL TRANSVNS PM ELTRD 1 LEAD SYS ATR/VENTR N/A 4/27/2020 REMOVE PACEMAKER DUAL LEADS performed by Maribell Alarcon MD at [facility] CARDIAC CATH LAB  
 [place] TRABECULOPLASTY BY LASER SURGERY    
 US GUIDE ASP OVARIAN CYST ABD APPROACH  8.21.07  
 RIGHT Personal factors and/or comorbidities impacting plan of care:  
 
Home Situation Home Environment: Private residence # Steps to Enter: 1 Rails to Enter: Yes Wheelchair Ramp: Yes One/Two Story Residence: One story Living Alone: No 
Support Systems: Spouse/Significant Other/Partner Patient Expects to be Discharged to[de-identified] Private residence Current DME Used/Available at Home: Walker, rolling, Transfer bench, Raised toilet seat, Cane, straight Tub or Shower Type: Shower EXAMINATION/PRESENTATION/DECISION MAKING:  
Critical Behavior: 
Neurologic State: Alert Orientation Level: Oriented X4 Cognition: Appropriate decision making, Appropriate for age attention/concentration, Appropriate safety awareness, Follows commands Safety/Judgement: Awareness of environment Hearing: Auditory Auditory Impairment: None Functional Mobility: 
Bed Mobility: 
Rolling: Moderate assistance Supine to Sit: Minimum assistance Sit to Supine: Minimum assistance Transfers: 
Sit to Stand: Moderate assistance(3 attempts) Stand to Sit: Minimum assistance Balance:  
Sitting: Impaired; Without support Sitting - Static: Good (unsupported) Sitting - Dynamic: Good (unsupported) Standing: Impaired; With support Standing - Static: Fair;Poor;Constant support Standing - Dynamic : Fair;Constant support Ambulation/Gait Training: 
Distance (ft): 25 Feet (ft) Assistive Device: Gait belt;Walker, rolling Ambulation - Level of Assistance: Minimal assistance; Moderate assistance Gait Abnormalities: Antalgic;Decreased step clearance;Trunk sway increased Base of Support: Shift to right Step Length: Right shortened;Left shortened Activity Tolerance:  
Fair Please refer to the flowsheet for vital signs taken during this treatment. After treatment patient left in no apparent distress:  
Supine in bed, Call bell within reach, and Bed / chair alarm activated COMMUNICATION/EDUCATION:  
The patients plan of care was discussed with: Registered nurse. Fall prevention education was provided and the patient/caregiver indicated understanding., Patient/family have participated as able in goal setting and plan of care. , and Patient/family agree to work toward stated goals and plan of care. Thank you for this referral. 
Kelvin Smoker, PT, DPT Time Calculation: 25 mins

## 2020-09-10 NOTE — PROGRESS NOTES
Comprehensive Nutrition Assessment Type and Reason for Visit: [place] Nutrition Recommendations/Plan: 1. Encouraged PO/ protein with all meals for wound healing. 2. Add \"Bariatric Vitamin Panel\" along with Vit C 500 mg BID, and zinc sulfate 220 mg daily x 10 days for wound healing (if pt willing to take) Nutrition Assessment:   
Pt admitted for sepsis. PMHx:  Gastric bypass (2008), HTN, DM2, Atrial fibrillation/flutter, CKD3. 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 Sepsis on admission 2° infected sternoclavicular joint. I&D and resection of medial 3rd of R clavicle on 8/24, wound VAC off. Continues with IV abx and awaiting new COVID test for rehab placement. Pt unavailable at time of visit. Appetite fair/good (see below). Education provided earlier regarding protein foods and with previous GBS aware of high protein foods. Does not like Glucerna,  Magic Cup and Yogurt in place as snacks. Eats smaller portions and avoids high levels of concentrated sweets with gastric bypass hx. Had been compliant with vitamins at home but does not want to received this admit. Would recommend resuming vitamins since extended stay. Per previous interview pt reports wt fluctuations of 30# due to fluid status. Wt loss of 37lb (14% BW) over past 8 months with poor PO intake and limited activity with hospitalizations. Hard to discern true weights since recent BKA and fluid fluctuations. However, given recent issues and wound, she is at risk for malnutrition d/t increased nutrient needs. Last 3 Recorded Weights in this Encounter 09/07/20 9782 09/08/20 9699 09/09/20 0365 Weight: 101.2 kg (223 lb 1.7 oz) 101.2 kg (223 lb 1.7 oz) 101.5 kg (223 lb 12.3 oz) Malnutrition Assessment: 
Malnutrition Status: At risk for malnutrition (specify)(increased needs) Nutritionally Significant Medications: dulcolax, bumex, buspar, cardizem, lantus (10 units), KCl, zoloft, vanco; PRN: zofran, miralax, phenergan Estimated Daily Nutrient Needs: 
Energy (kcal):  1251-1328(MSJ x 1.2-1.3) Protein (g):  124 - 1.2g/kg Fluid (ml/day):  2100 - 1ml/kcal 
 
Nutrition Related Findings:   
Edema: 1+ RLE Last BM: 9/7 Wounds:  Surgical wound(clavicle) Current Nutrition Therapies:  
Diet: consistent CHO Supplements: Magic Cup Meal intake:  
Patient Vitals for the past 168 hrs: 
 % Diet Eaten 09/09/20 1846 75 % 09/07/20 0919 50 % 09/06/20 0801 80 % 09/05/20 0804 20 % Anthropometric Measures: 
· Height:  5' 10\" (177.8 cm) · Current Body Wt:  103.7 kg (228 lb 9.9 oz) · Admission Body Wt:  231 lb 4.2 oz · Usual Body Wt:  (240-275 lb) · Ideal Body Wt:    lb:  152.4 % · Adjusted Body Weight:  242.1 lb; Weight Adjustment for: Amputation · Adjusted BMI:  34.7 · BMI Categories:  Obese class 1 (BMI 30.0-34. 9) Last 3 Recorded Weights in this Encounter 09/07/20 7951 09/08/20 6532 09/09/20 9932 Weight: 101.2 kg (223 lb 1.7 oz) 101.2 kg (223 lb 1.7 oz) 101.5 kg (223 lb 12.3 oz) Wt Readings:  
08/21/20 102.8 kg (226 lb 11.2 oz) - bed  
08/20/20 104.9 kg (231 lb 6.4 oz) - bed  
05/06/20 110.2 kg (243 lb) - BKA on 5/1/2020 03/26/20 113.9 kg (251 lb)  
03/19/20 108.2 kg (238 lb 8.6 oz) 01/24/20 111.6 kg (246 lb)  
01/14/20 113.5 kg (250 lb 3.6 oz) Nutrition Diagnosis:  
· Increased nutrient needs related to increased demand for energy/nutrients as evidenced by wounds · Altered GI function(Impaired nutrient utilization) related to altered GI structure as evidenced by (hx of GBS) Nutrition Interventions:  
Food and/or Nutrient Delivery: Continue current diet Nutrition Education and Counseling: Education completed Coordination of Nutrition Care: Continued inpatient monitoring Goals: Continued consumption of at least 75% meals with good protein intake Nutrition Monitoring and Evaluation:  
Behavioral-Environmental Outcomes: Readiness for change, Beliefs and attitudes Food/Nutrient Intake Outcomes: Food and nutrient intake, Vitamin/mineral intake Physical Signs/Symptoms Outcomes: Biochemical data, Weight Discharge Planning:   
Continue current diet Shanita Mitchell, RD [address] , Pager #[phone] or via [employer]

## 2020-09-10 NOTE — PROGRESS NOTES
[address] [facility] Adult  Hospitalist Group Hospitalist Progress Note Luiza Griffin MD 
Answering service: [phone] OR [phone] from in house phone NAME:  Ryan Yanes :  1960 MRN:  [id number] Admission Summary: A 60year old female with past medical history HTN, DM Type II on insulin, Atrial fibrillation/flutter, CKD stage III, recent admission 2020-2020 for left foot cellulitis/OM s/p left BKA, bacteremia MSSA, endocarditis, Pacemaker vegetation s/p lead extraction, presenting to [facility] as a direct transfer from [facility] emergency department for thoracic surgery evaluation.  Patient developed right-sided chest pain/tenderness radiating to right shoulder.  Seen at urgent care and found to have atrial fibrillation with RVR. Sent to the emergency department for further evaluation.  In the ED, CT chest demonstrated septic arthritis of the right sternal clavicular joint and no abscess.  Given Merrem and vancomycin and transferred to [facility] 2020 
  
Interval history / Subjective:  
 
Patient seen and examined at the bedside. Patient is ready for discharge today INR is therapeutic Patient got her home catheter yesterday site clean dry [facility]ed [facility] now [facility] repeat COVID test last one done  Rapid test ordered may be able to discharge today if authorization and rapid test result available Discussed with the patient as well no overnight issue Assessment & Plan:  
 
Sepsis secondary to septic right sternoclavicular joint and abscess- resolved - Recent complicated hospitalization with MSSA bacteremia/endocarditis - Incision and debridement of right sternoclavicular joint.  Resection of medial third of right clavicle.  Placement of wound VAC on  
- OR cx: MSSA - continue IV vancomycin - plan is for 6 weeks until 10/5/2020.  
- Will need to see ID within 2 weeks of d/c. To Dr. Amanda Zamorano for review - pain control with dilaudid - ID consulted, appreciate melvin Nichols cath in place  
  
Atrial fibrillation with RVR 
- rate controlled, off Cardizem drip, continue with oral diltiazem 
- HR improved and < 100  
- restarted on warfarin-INR 2.6 Hx of pacemaker lead vegetation and s/p removal of device 
-stable, continue cardiac monitoring 
  
PARAG on CKD stage III  
- creatinine starting to rise, was on bank and also diuresed 6 now creatinine stable 
- continue decreased bumex dosing - now at 0.5 BID from 2 mg daily 
- renally dose vanc ID and pharmacy following 
  
T2DM, controlled: Episodes of hyperglycemia noted  
-continue long-acting, continue SSI. A1c 6.1, monitor finger stick glucose 
  
HTN  
- BP stable on hydralazine 
  
Depression 
-stable, continue buspirone, Zoloft Vaginal yeast infection  
-continue nystatin Code status: Full Code DVT prophylaxis: SCD, on coumadin, pharmacy to dose Care Plan discussed with: Patient/Family, Nurse and  Anticipated Disposition: TBD Anticipated Discharge: Today or tomorrow plan as above authorization and repeat COVID test to [facility] Problems  Date Reviewed: 8/24/2020 None Vital Signs:  
 Last 24hrs VS reviewed since prior progress note. Most recent are: 
Visit Vitals /74 (BP 1 Location: Left arm, BP Patient Position: At rest) Pulse 83 Temp 98.5 °F (36.9 °C) Resp 18 Ht 5' 10\" (1.778 m) Wt 101.5 kg (223 lb 12.3 oz) SpO2 97% BMI 32.11 kg/m² Intake/Output Summary (Last 24 hours) at 9/10/2020 1101 Last data filed at 9/10/2020 1232 Gross per 24 hour Intake 660 ml Output 1900 ml Net -1240 ml Physical Examination:  
 
Constitutional:  No acute distress, cooperative, pleasant ENT:  Oral mucosa moist, oropharynx benign. Resp: CTA bilaterally. No wheezing/rhonchi/rales. No accessory muscle use veronica cath+ surgical site c/d CV:  Regular rhythm, normal rate, no murmurs, gallops, rubs GI:  Soft, non distended, non tender. normoactive bowel sounds, no hepatosplenomegaly Musculoskeletal:  No edema, warm, Neurologic:  L BKA, R arm in sling. AAOx3, cranial nerves grossly normal, normal speech rate and rhythm Data Review:  
 Review and/or order of clinical lab test 
Review and/or order of tests in the radiology section of CPT Review and/or order of tests in the medicine section of CPT Ir Insert Tunl Cvc W/o Port Over 5 [address] Result Date: 9/8/2020 IMPRESSION: Successful placement of left internal jugular tunneled long-term central venous catheter. There catheter is ready for immediate use. Labs:  
 
No results for input(s): WBC, HGB, HCT, PLT, HGBEXT, HCTEXT, PLTEXT, HGBEXT, HCTEXT, PLTEXT in the last 72 hours. Recent Labs  
  09/10/20 
0557 09/09/20 0440 09/08/20 
9075  135* 137  
K 3.9 4.7 4.2  106 108 CO2 24 23 24 BUN 24* 24* 25* CREA 1.95* 1.93* 1.92* GLU 60* 113* 74  
CA 9.1 9.2 9.0 No results for input(s): ALT, AP, TBIL, TBILI, TP, ALB, GLOB, GGT, AML, LPSE in the last 72 hours. No lab exists for component: SGOT, GPT, AMYP, HLPSE Recent Labs  
  09/10/20 
0603 09/09/20 
0440 09/08/20 
1005 INR 3.0* 2.6* 2.3* PTP 28.5* 24.7* 21.9* No results for input(s): FE, TIBC, PSAT, FERR in the last 72 hours. Lab Results Component Value Date/Time Folate 8.5 03/14/2020 02:49 PM  
  
No results for input(s): PH, PCO2, PO2 in the last 72 hours. No results for input(s): CPK, CKNDX, TROIQ in the last 72 hours. No lab exists for component: CPKMB Lab Results Component Value Date/Time  Cholesterol, total 141 11/11/2019 08:47 AM  
 HDL Cholesterol 32 (L) 11/11/2019 08:47 AM  
 LDL, calculated 82 11/11/2019 08:47 AM  
 Triglyceride 137 11/11/2019 08:47 AM  
 
 Lab Results Component Value Date/Time Glucose (POC) 72 09/10/2020 06:42 AM  
 Glucose (POC) 115 (H) 09/09/2020 09:30 PM  
 Glucose (POC) 100 09/09/2020 04:29 PM  
 Glucose (POC) 177 (H) 09/09/2020 11:18 AM  
 Glucose (POC) 155 (H) 09/09/2020 08:13 AM  
 
Lab Results Component Value Date/Time Color YELLOW/STRAW 05/23/2020 02:35 PM  
 Appearance CLOUDY (A) 05/23/2020 02:35 PM  
 Specific gravity 1.009 05/23/2020 02:35 PM  
 pH (UA) 6.0 05/23/2020 02:35 PM  
 Protein Negative 05/23/2020 02:35 PM  
 Glucose Negative 05/23/2020 02:35 PM  
 Ketone Negative 05/23/2020 02:35 PM  
 Bilirubin Negative 05/23/2020 02:35 PM  
 Urobilinogen 0.2 05/23/2020 02:35 PM  
 Nitrites Positive (A) 05/23/2020 02:35 PM  
 Leukocyte Esterase LARGE (A) 05/23/2020 02:35 PM  
 Epithelial cells FEW 05/23/2020 02:35 PM  
 Bacteria 4+ (A) 05/23/2020 02:35 PM  
 WBC >100 (H) 05/23/2020 02:35 PM  
 RBC 0-5 05/23/2020 02:35 PM  
 
 
 
Medications Reviewed:  
 
Current Facility-Administered Medications Medication Dose Route Frequency  bacitracin 500 unit/gram ointment   Topical TID  vancomycin (VANCOCIN) 1250 mg in  ml infusion  1,250 mg IntraVENous Q48H  
 diphenhydrAMINE (BENADRYL) capsule 25 mg  25 mg Oral Q6H PRN  Vancomycin- Pharmacy Dosing   Other Rx Dosing/Monitoring  potassium chloride SR (KLOR-CON 10) tablet 20 mEq  20 mEq Oral DAILY  bumetanide (BUMEX) tablet 0.5 mg  0.5 mg Oral BID  
 HYDROmorphone (DILAUDID) tablet 3 mg  3 mg Oral Q4H PRN  
 sodium chloride (NS) flush 5-40 mL  5-40 mL IntraVENous Q8H  
 sodium chloride (NS) flush 5-40 mL  5-40 mL IntraVENous PRN  
 naloxone (NARCAN) injection 0.4 mg  0.4 mg IntraVENous PRN  
 ondansetron (ZOFRAN) injection 4 mg  4 mg IntraVENous Q4H PRN  
 insulin glargine (LANTUS) injection 10 Units  10 Units SubCUTAneous QHS  Warfarin- Pharmacy Dosing   Other Rx Dosing/Monitoring  HYDROmorphone (PF) (DILAUDID) injection 0.5 mg  0.5 mg IntraVENous Q3H PRN  
  nystatin (MYCOSTATIN) 100,000 unit/gram cream   Topical BID  sodium chloride (NS) flush 5-40 mL  5-40 mL IntraVENous Q8H  
 sodium chloride (NS) flush 5-40 mL  5-40 mL IntraVENous PRN  
 naloxone (NARCAN) injection 0.1 mg  0.1 mg IntraVENous PRN  
 bisacodyL (DULCOLAX) tablet 10 mg  10 mg Oral DAILY  hydrALAZINE (APRESOLINE) 20 mg/mL injection 20 mg  20 mg IntraVENous Q6H PRN  
 hydrALAZINE (APRESOLINE) tablet 100 mg  100 mg Oral TID  busPIRone (BUSPAR) tablet 10 mg  10 mg Oral BID  dilTIAZem ER (CARDIZEM CD) capsule 180 mg  180 mg Oral DAILY  sertraline (ZOLOFT) tablet 75 mg  75 mg Oral DAILY  acetaminophen (TYLENOL) tablet 650 mg  650 mg Oral Q6H PRN Or  
 acetaminophen (TYLENOL) suppository 650 mg  650 mg Rectal Q6H PRN  polyethylene glycol (MIRALAX) packet 17 g  17 g Oral DAILY PRN  promethazine (PHENERGAN) tablet 12.5 mg  12.5 mg Oral Q6H PRN  
 glucose chewable tablet 16 g  4 Tab Oral PRN  
 glucagon (GLUCAGEN) injection 1 mg  1 mg IntraMUSCular PRN  
 dextrose 10% infusion 0-250 mL  0-250 mL IntraVENous PRN  
 gabapentin (NEURONTIN) capsule 100 mg  100 mg Oral TID PRN  
 insulin regular (NOVOLIN R, HUMULIN R) injection   SubCUTAneous AC&HS  
 
______________________________________________________________________ EXPECTED LENGTH OF STAY: 3d 17h ACTUAL LENGTH OF STAY:          21 Swathi Campa MD

## 2020-09-10 NOTE — PROGRESS NOTES
TEE: 
1. [facility] accepted. 2. Waiting covid test result. CM noted in rounds, once covid test results then she can discharge to [address] [place] [address].  
 
Elkin Carrero, [facility]

## 2020-09-10 NOTE — PROGRESS NOTES
Bedside shift change report given to Марина Knight (oncoming nurse) by Seng Ariza (offgoing nurse). Report included the following information SBAR, Kardex, MAR and Recent Results.

## 2020-09-10 NOTE — PROGRESS NOTES
Bedside shift change report given to lisa (oncoming nurse) by jelani (offgoing nurse). Report included the following information SBAR, Kardex and MAR. DC to [facility] with neg covid

## 2020-09-10 NOTE — PROGRESS NOTES
Pharmacist Note  Warfarin Dosing Consult provided for this 60 y. o.female to manage warfarin for Atrial Fibrillation INR Goal: 2 - 3 Home regimen/ tablet size: 2 mg PO daily Drugs that may increase INR: None Drugs that may decrease INR: None Other current anticoagulants/ drugs that may increase bleeding risk: Sertraline Risk factors: None Daily INR ordered: YES Recent Labs  
  09/10/20 
0603 09/09/20 
0440 09/08/20 
9778 INR 3.0* 2.6* 2.3* Date               INR                  Dose 8/27  --  3 mg  
8/28                1.1                   3 mg 
8/29                1.1                   3 mg 
8/30                1.1                   3 mg 
8/31                1.1                   6 mg 
9/1                  1.3                   5 mg 
9/2                  1.4                   5 mg 
9/3                  1.7                   5 mg 
9/4                  2.4                   Held by MD 
9/5                  2.5                   Held by MD 
9/6                  2.7                   Held by MD 
9/7                  2.3                   Hold 9/8                  2.3                   2.5 mg 
9/9                  2.6                   2 mg 9/10                3                      hold Assessment/ Plan: Will hold warfarin today for INR=3 Pharmacy will continue to monitor daily and adjust therapy as indicated. Please contact the pharmacist at  for outpatient recommendations if needed.

## 2020-09-10 NOTE — PROGRESS NOTES
HYPOGLYCEMIC EPISODE DOCUMENTATION    Patient with hypoglycemic episode(s) at 1725 (time) on 9/10/20 (date). BG value(s) pre-treatment 46    Was patient symptomatic?  [] yes, [x] no  Patient was treated with the following rescue medications/treatments: [] D50                [] Glucose tablets                [] Glucagon                [x] 4oz juice                [] 6oz reg soda                [] 8oz low fat milk  BG value post-treatment: 99  Once BG treated and value greater than 80mg/dl, pt was provided with the following:  [] snack  [] meal  Name of MD notified: Dr. Teodoro Kessler  The following orders were received: none

## 2020-09-11 NOTE — PROGRESS NOTES
Inpatient Rehab/ Hospital to Hospital Transition of Care Note /Discharge Note EMTALA filed and ready for MD to sign at bedside chart. Accepting Physician: Dr. Joseph Cueto Discharging Physician: Dr. Katharina Duane Accepting Representative: Kiersten Armendariz Accepting Facility: [facility] RN call to report: [phone] Transport: [facility] phone [phone] or Family  time: 1500 Ambulance packet at bedside chart. Patient is agreeable to plan. The attending physician and the primary nurse were notified of the plan.   
 
[facility]

## 2020-09-11 NOTE — ROUTINE PROCESS
HYPOGLYCEMIC EPISODE DOCUMENTATION Patient with hypoglycemic episode(s) at 1706(time) on 9-11-20(date). BG value(s) pre-treatment 64 Was patient symptomatic? [] yes, [x] no Patient was treated with the following rescue medications/treatments: [] D50 [] Glucose tablets 
              [] Glucagon 
              [x] 4oz juice 
              [] 6oz reg soda 
              [] 8oz low fat milk BG value post-treatment: 96 Once BG treated and value greater than 80mg/dl, pt was provided with the following: 
[] snack [x] meal 
Name of MD notified:Dewey The following orders were received: Per protocol

## 2020-09-11 NOTE — DISCHARGE SUMMARY
Discharge Summary PATIENT ID: Bird Gaston MRN: [id number] YOB: 1960 DATE OF ADMISSION: 8/20/2020  7:16 AM   
DATE OF DISCHARGE: 9/11/20 PRIMARY CARE PROVIDER: Royal Merna MD  
 
ATTENDING PHYSICIAN: Selma Whitley DISCHARGING PROVIDER: Jadon Tyler MD   
To contact this individual call [phone] and ask the  to page. If unavailable ask to be transferred the Adult Hospitalist Department. CONSULTATIONS: IP CONSULT TO HOSPITALIST 
IP CONSULT TO INFECTIOUS DISEASES PROCEDURES/SURGERIES: Procedure(s): RIGHT CHEST WOUND RE EXPLORATION, DELAYED CLOSURE (URGENT) [address] COURSE:  
A 60year old female with past medical history HTN, DM Type II on insulin, Atrial fibrillation/flutter, CKD stage III, recent admission 4/19/2020-5/13/2020 for left foot cellulitis/OM s/p left BKA, bacteremia MSSA, endocarditis, Pacemaker vegetation s/p lead extraction, presenting to [facility] as a direct transfer from [facility] emergency department for thoracic surgery evaluation.  Patient developed right-sided chest pain/tenderness radiating to right shoulder.  Seen at urgent care and found to have atrial fibrillation with RVR. Sent to the emergency department for further evaluation.  In the ED, CT chest demonstrated septic arthritis of the right sternal clavicular joint and no abscess.  Given Merrem and vancomycin and transferred to [facility] 8/20/2020 Assessment & Plan:  
  
Sepsis secondary to septic right sternoclavicular joint and abscess- resolved - Recent complicated hospitalization with MSSA bacteremia/endocarditis - Incision and debridement of right sternoclavicular joint.  Resection of medial third of right clavicle.  Placement of wound VAC on 8/20 
- OR cx: MSSA 
- continue IV vancomycin - plan is for 6 weeks until 10/5/2020.  
- Will need to see ID within 2 weeks of d/c. To Dr. Sue Noguera for review - ID consulted, appreciate melvin Simone cath in place  
  
Atrial fibrillation with RVR 
- continue with oral diltiazem 
- HR improved and < 100  
- restarted on warfarin-INR 2.5 
  
Hx of pacemaker lead vegetation and s/p removal of device 
-stable, continue cardiac monitoring 
  
PARAG on CKD stage III  
-Creatinine is stable-Bumex now at 0.5 BID from 2 mg daily 
- renally dose vanc ID and pharmacy following 
  
T2DM, controlled: Episodes of hyperglycemia noted  
-continue long-acting, continue SSI. A1c 6.1, monitor finger stick glucose 
  
HTN  
- BP stable on hydralazine 
  
Depression 
-stable, continue buspirone, Zoloft 
  
Vaginal yeast infection  
-continue nystatin SARS-CoV-2 negative DISCHARGE DIAGNOSES / PLAN:   
 
1. Discharge to [facility] for antibiotics and physical therapy ADDITIONAL CARE RECOMMENDATIONS:  
  
 
PENDING TEST RESULTS:  
At the time of discharge the following test results are still pending: FOLLOW UP APPOINTMENTS:   
Follow-up Information Follow up With Specialties Details Why Contact Info Margarita Unger MD Internal Medicine In 1 week  [address] [place] [address] [phone] DIET: Cardiac Diet ACTIVITY: Activity as tolerated WOUND CARE:  
 
EQUIPMENT needed:  
 
 
DISCHARGE MEDICATIONS: 
Current Discharge Medication List  
  
CONTINUE these medications which have CHANGED Details  
bumetanide (BUMEX) 0.5 mg tablet Take 1 Tab by mouth two (2) times a day for 30 days. Qty: 60 Tab, Refills: 0 CONTINUE these medications which have NOT CHANGED Details  
insulin glargine (Lantus Solostar U-100 Insulin) 100 unit/mL (3 mL) inpn 10 Units by SubCUTAneous route Daily (before breakfast). sertraline (Zoloft) 50 mg tablet Take 75 mg by mouth daily. warfarin (COUMADIN) 2 mg tablet Take 1 tablet po daily 
Qty: 50 Tab, Refills: 5  Associated Diagnoses: Long term (current) use of anticoagulants; PAF (paroxysmal atrial fibrillation) ([facility] Cartia  mg capsule Take 180 mg by mouth daily. hydrALAZINE (APRESOLINE) 100 mg tablet Take 1 Tab by mouth three (3) times daily. Qty: 90 Tab, Refills: 11  
 Associated Diagnoses: Essential hypertension  
  
busPIRone (BUSPAR) 10 mg tablet TAKE ONE TABLET BY MOUTH TWICE A DAY Qty: 60 Tab, Refills: 10  
 Associated Diagnoses: Anxiety as acute reaction to gross stress  
  
insulin lispro (HUMALOG) 100 unit/mL kwikpen 2 Units by SubCUTAneous route after meals as needed. 2 units with dinner for sugars over 200 Qty: 1 Package, Refills: 0  
  
  
 
 
 
NOTIFY YOUR PHYSICIAN FOR ANY OF THE FOLLOWING:  
Fever over 101 degrees for 24 hours. Chest pain, shortness of breath, fever, chills, nausea, vomiting, diarrhea, change in mentation, falling, weakness, bleeding. Severe pain or pain not relieved by medications. Or, any other signs or symptoms that you may have questions about. DISPOSITION: 
  Home With: 
 OT  PT  HH  RN  
  
x Long term SNF/Inpatient Rehab Independent/assisted living Hospice Other:  
 
 
PATIENT CONDITION AT DISCHARGE:  
 
Functional status Poor   
x Deconditioned Independent Cognition  
 x Lucid Forgetful Dementia Catheters/lines (plus indication) [place]   
x PICC   
 PEG None Code status  
x  Full code DNR   
 
PHYSICAL EXAMINATION AT DISCHARGE: 
Constitutional:  No acute distress, cooperative, pleasant   
ENT:  Oral mucosa moist, oropharynx benign. Resp:  CTA bilaterally. No wheezing/rhonchi/rales. No accessory muscle use veronica cath+ surgical site c/d CV:  Regular rhythm, normal rate, no murmurs, gallops, rubs GI:  Soft, non distended, non tender. normoactive bowel sounds, no hepatosplenomegaly Musculoskeletal:  No edema, warm, Neurologic:  L BKA, R arm in sling. AAOx3, cranial nerves grossly normal, normal speech rate and rhythm CHRONIC MEDICAL DIAGNOSES: 
 Problem List as of 9/11/2020 Date Reviewed: 8/24/2020 Codes Class Noted - Resolved Left below-knee amputee [facility]) ICD-10-CM: N61.479 ICD-9-CM: V49.75  6/11/2020 - Present H/O bilateral mastectomy ICD-10-CM: Z90.13 ICD-9-CM: V45.71  3/26/2020 - Present Foot deformity, right ICD-10-CM: M21.961 ICD-9-CM: 736.70  9/24/2019 - Present Pes cavus ICD-10-CM: Q66.70 ICD-9-CM: 736.73  9/24/2019 - Present Diabetic ulcer of right midfoot associated with type 2 diabetes mellitus, with fat layer exposed ([facility] ICD-10-CM: E11.621, F85.064 ICD-9-CM: 250.80, 707.14  8/6/2019 - Present Venous stasis ulcer of right lower leg with edema of right lower leg (HCC) ICD-10-CM: I83.019, I83.891, L97.919, R60.9 ICD-9-CM: 454.8, 454.0  11/14/2018 - Present Diabetic polyneuropathy associated with type 2 diabetes mellitus ([facility] ICD-10-CM: E11.42 
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 Madhu Lopez Devoid Morbid obesity with BMI of 40.0-44.9, adult [facility]) ICD-10-CM: E66.01, Z68.41 
ICD-9-CM: 278.01, V85.41  5/1/2017 - Present Controlled type 2 diabetes mellitus with stage 4 chronic kidney disease, with long-term current use of insulin (HCC) ICD-10-CM: E11.22, N18.4, Z79.4 ICD-9-CM: 250.40, 585.4, V58.67  1/16/2017 - Present PAF (paroxysmal atrial fibrillation) (HCC) ICD-10-CM: I48.0 ICD-9-CM: 427.31  11/28/2016 - Present Anemia in stage 4 chronic kidney disease (HCC) ICD-10-CM: N18.4, D63.1 ICD-9-CM: 285.21, 585.4  11/28/2016 - Present Essential hypertension ICD-10-CM: I10 
ICD-9-CM: 401.9  8/26/2016 - Present Systolic murmur U-32-XX: R01.1 ICD-9-CM: 785.2  12/7/2015 - Present CKD (chronic kidney disease), stage IV ([address] ICD-10-CM: N18.4 ICD-9-CM: 585.4  6/9/2012 - Present Overview Addendum 3/12/2013  8:11 AM by Daniel Blair. Acute on chronic, Dr. Chidi Lee Mixed hyperlipidemia ICD-10-CM: E78.2 ICD-9-CM: 272.2  5/22/2012 - Present Long term (current) use of anticoagulants ICD-10-CM: Z79.01 
ICD-9-CM: V58.61  4/11/2012 - Present S/P cardiac pacemaker procedure ICD-10-CM: Z95.0 ICD-9-CM: V45.01  4/3/2012 - Present Overview Addendum 6/12/2020  2:12 PM by Ave Sherman MD  
  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 Sick sinus syndrome (HCC) (Chronic) ICD-10-CM: I49.5 ICD-9-CM: 427.81  4/2/2012 - Present Overview Signed 4/2/2012  1:37 PM by Emmie Mooney NP With 5 second and greater pauses, dual chamber pacemaker placement and loop recorder explant done>medtronic device Status post ablation of atrial fibrillation (Chronic) ICD-10-CM: X65.712, Z86.79 
ICD-9-CM: V45.89  12/15/2011 - Present Overview Signed 12/15/2011  9:23 AM by Faisal Michelle NP  
  12/14/11 PVI ablation of atrial fibrillation Fe deficiency anemia ICD-10-CM: D50.9 ICD-9-CM: 280.9  4/14/2010 - Present Overview Signed 4/23/2010 12:50 PM by Daniel Blair. EGD and Colonoscopy neg latter repeat in 10 years History of breast cancer ICD-10-CM: Z85.3 ICD-9-CM: V10.3  4/13/2010 - Present Overview Addendum 6/11/2018  4:11 PM by Ave Sherman MD  
  1998, right modified radical mastectomy  0/11 nodes, + est and pro receptors. chemotherapy x 4 `1999 2008 left cancer with mastectomy and bilateral reconstruction using own tissues. Asthma ICD-10-CM: U03.167 ICD-9-CM: 493.90  4/13/2010 - Present Overview Signed 4/13/2010  9:20 AM by Megan Stewart.  
  infectious RESOLVED: Disorder of sternoclavicular joint ICD-10-CM: M25.9 ICD-9-CM: 719.91  8/21/2020 - 9/3/2020 * (Principal) RESOLVED: Sepsis ([facility] ICD-10-CM: A41.9 ICD-9-CM: 038.9, 995.91  8/20/2020 - 9/3/2020 RESOLVED: Sepsis ([facility] ICD-10-CM: A41.9 ICD-9-CM: 038.9, 995.91  4/20/2020 - 6/11/2020 RESOLVED: Atrial fibrillation with rapid ventricular response (HCC) ICD-10-CM: I48.91 
ICD-9-CM: 427.31  3/8/2020 - 3/26/2020 RESOLVED: Left leg cellulitis (Chronic) ICD-10-CM: M79.082 ICD-9-CM: 682.6  3/8/2020 - 6/11/2020 RESOLVED: Elevated troponin ICD-10-CM: R79.89 ICD-9-CM: 790.6  3/8/2020 - 3/26/2020 RESOLVED: Atrial fibrillation with RVR (HCC) ICD-10-CM: I48.91 
ICD-9-CM: 427.31  3/8/2020 - 6/12/2020 RESOLVED: Diabetic ulcer of left midfoot with necrosis of muscle ([facility] ICD-10-CM: E11.621, Y99.831 ICD-9-CM: 250.80, 707.14  3/3/2020 - 6/11/2020 RESOLVED: Traumatic hematoma of foot, left, initial encounter ICD-10-CM: Q29.00XB ICD-9-CM: 924.20  2/25/2020 - 6/11/2020 RESOLVED: Sepsis ([facility] ICD-10-CM: A41.9 ICD-9-CM: 038.9, 995.91  1/14/2020 - 1/23/2020 RESOLVED: Cellulitis ICD-10-CM: L03.90 ICD-9-CM: 682.9  12/6/2019 - 12/11/2019 RESOLVED: Type 2 diabetes mellitus with left diabetic foot infection ([facility] ICD-10-CM: E11.628, L08.9 ICD-9-CM: 250.80, 686.9  10/29/2019 - 6/11/2020 RESOLVED: Foot deformity, acquired, left ICD-10-CM: L66.180 ICD-9-CM: 736.70  9/24/2019 - 6/11/2020 RESOLVED: Cellulitis of right lower extremity ICD-10-CM: L03.115 ICD-9-CM: 682.6  8/9/2019 - 3/26/2020 RESOLVED: Sepsis ([facility] ICD-10-CM: A41.9 ICD-9-CM: 038.9, 995.91  8/9/2019 - 8/14/2019 RESOLVED: Cellulitis and abscess of right leg ICD-10-CM: L03.115, L02.415 ICD-9-CM: 682.6  8/9/2019 - 8/14/2019 RESOLVED: Diabetic ulcer of left heel associated with type 2 diabetes mellitus, with fat layer exposed ([facility] (Chronic) ICD-10-CM: D54.443, L81.284 ICD-9-CM: 250.80, 707.14  6/21/2019 - 6/11/2020 RESOLVED: Open breast wound, left, initial encounter ICD-10-CM: S21.002A ICD-9-CM: 879.0  6/7/2019 - 3/26/2020 RESOLVED: Laceration of right leg excluding thigh, subsequent encounter ICD-10-CM: S81.811D ICD-9-CM: V58.89, 891.0  12/28/2018 - 12/11/2019 RESOLVED: Bleeding disorder ([facility] ICD-10-CM: D69.9 ICD-9-CM: 287.9  4/13/2010 - 3/30/2017 Greater than 30 minutes were spent with the patient on counseling and coordination of care Signed:  
Glenice Cushing, MD 
9/11/2020 
9:54 AM

## 2020-09-11 NOTE — PROGRESS NOTES
Problem: Risk for Spread of Infection Goal: Prevent transmission of infectious organism to others Description: Prevent the transmission of infectious organisms to other patients, staff members, and visitors. Outcome: Progressing Towards Goal 
  
Problem: Pain Goal: *Control of Pain Outcome: Progressing Towards Goal 
  
Problem: Pressure Injury - Risk of 
Goal: *Prevention of pressure injury Description: Document Valdemar Scale and appropriate interventions in the flowsheet. Outcome: Progressing Towards Goal 
Note: Pressure Injury Interventions: 
Sensory Interventions: Assess changes in LOC, Assess need for specialty bed, Keep linens dry and wrinkle-free, Maintain/enhance activity level, Minimize linen layers Moisture Interventions: Internal/External urinary devices Activity Interventions: Increase time out of bed Mobility Interventions: Pressure redistribution bed/mattress (bed type) Nutrition Interventions: Document food/fluid/supplement intake Friction and Shear Interventions: Minimize layers

## 2020-09-11 NOTE — PROGRESS NOTES
CM called [facility] in reference that there was a no-show at 1500 to pick patient up and [facility] is stating that patient is on will-call. TANJA spoke with Supervisor, Janny at [facility] and she states she will send a crew to transport as soon as the crew signs in. Unable to give time of arrival. 
 
Rosa Claudio, BSN RN  of Care Management [phone]

## 2020-09-11 NOTE — PROGRESS NOTES
Bedside shift change report given to Ngozi Watts RN (oncoming nurse) by Andrei Acuna RN (offgoing nurse). Report included the following information SBAR and Kardex.

## 2020-09-11 NOTE — PROGRESS NOTES
Problem: Risk for Spread of Infection Goal: Prevent transmission of infectious organism to others Description: Prevent the transmission of infectious organisms to other patients, staff members, and visitors. Outcome: Resolved/Not Met Problem: Patient Education:  Go to Education Activity Goal: Patient/Family Education Outcome: Resolved/Not Met Problem: Falls - Risk of 
Goal: *Absence of Falls Description: Document Vick Rodriguez Fall Risk and appropriate interventions in the flowsheet. Outcome: Resolved/Not Met Note: Fall Risk Interventions: 
Mobility Interventions: Communicate number of staff needed for ambulation/transfer Medication Interventions: Patient to call before getting OOB Elimination Interventions: Call light in reach Problem: Patient Education: Go to Patient Education Activity Goal: Patient/Family Education Outcome: Resolved/Not Met Problem: Pain Goal: *Control of Pain Outcome: Resolved/Not Met 
Goal: *PALLIATIVE CARE:  Alleviation of Pain Outcome: Resolved/Not Met Problem: Patient Education: Go to Patient Education Activity Goal: Patient/Family Education Outcome: Resolved/Not Met Problem: Nutrition Deficit Goal: *Optimize nutritional status Outcome: Resolved/Not Met Problem: Pressure Injury - Risk of 
Goal: *Prevention of pressure injury Description: Document Valdemar Scale and appropriate interventions in the flowsheet. Outcome: Resolved/Not Met Note: Pressure Injury Interventions: 
Sensory Interventions: Assess changes in LOC, Assess need for specialty bed, Keep linens dry and wrinkle-free, Maintain/enhance activity level, Minimize linen layers Moisture Interventions: Absorbent underpads, Apply protective barrier, creams and emollients Activity Interventions: Increase time out of bed, Pressure redistribution bed/mattress(bed type) Mobility Interventions: Float heels, Pressure redistribution bed/mattress (bed type) Nutrition Interventions: Document food/fluid/supplement intake Friction and Shear Interventions: Minimize layers Problem: Patient Education: Go to Patient Education Activity Goal: Patient/Family Education Outcome: Resolved/Not Met Problem: Patient Education: Go to Patient Education Activity Goal: Patient/Family Education Outcome: Resolved/Not Met Problem: Patient Education: Go to Patient Education Activity Goal: Patient/Family Education Outcome: Resolved/Not Met Problem: Sepsis: Day of Diagnosis Goal: Off Pathway (Use only if patient is Off Pathway) Outcome: Resolved/Not Met 
Goal: *Fluid resuscitation Outcome: Resolved/Not Met 
Goal: *Paired blood cultures prior to first dose of antibiotic Outcome: Resolved/Not Met 
Goal: *First dose of  appropriate antibiotic within 3 hours of arrival to ED, within 1 hour of arrival to ICU Outcome: Resolved/Not Met 
Goal: *Lactic acid with first set of blood cultures Outcome: Resolved/Not Met 
Goal: *Pneumococcal immunization (if eligible) Outcome: Resolved/Not Met 
Goal: *Influenza immunization (if eligible) Outcome: Resolved/Not Met 
Goal: Activity/Safety Outcome: Resolved/Not Met 
Goal: Consults, if ordered Outcome: Resolved/Not Met 
Goal: Diagnostic Test/Procedures Outcome: Resolved/Not Met 
Goal: Nutrition/Diet Outcome: Resolved/Not Met 
Goal: Discharge Planning Outcome: Resolved/Not Met 
Goal: Medications Outcome: Resolved/Not Met 
Goal: Respiratory Outcome: Resolved/Not Met 
Goal: Treatments/Interventions/Procedures Outcome: Resolved/Not Met 
Goal: Psychosocial 
Outcome: Resolved/Not Met Problem: Sepsis: Day 2 Goal: Off Pathway (Use only if patient is Off Pathway) Outcome: Resolved/Not Met 
Goal: *Central Venous Pressure maintained at 8-12 mm Hg Outcome: Resolved/Not Met 
Goal: *Hemodynamically stable Outcome: Resolved/Not Met 
Goal: *Tolerating diet Outcome: Resolved/Not Met 
Goal: Activity/Safety Outcome: Resolved/Not Met 
Goal: Consults, if ordered Outcome: Resolved/Not Met 
Goal: Diagnostic Test/Procedures Outcome: Resolved/Not Met 
Goal: Nutrition/Diet Outcome: Resolved/Not Met 
Goal: Discharge Planning Outcome: Resolved/Not Met 
Goal: Medications Outcome: Resolved/Not Met 
Goal: Respiratory Outcome: Resolved/Not Met 
Goal: Treatments/Interventions/Procedures Outcome: Resolved/Not Met 
Goal: Psychosocial 
Outcome: Resolved/Not Met Problem: Sepsis: Day 3 Goal: Off Pathway (Use only if patient is Off Pathway) Outcome: Resolved/Not Met 
Goal: *Central Venous Pressure maintained at 8-12 mm Hg Outcome: Resolved/Not Met 
Goal: *Oxygen saturation within defined limits Outcome: Resolved/Not Met 
Goal: *Vital sign stability Outcome: Resolved/Not Met 
Goal: *Tolerating diet Outcome: Resolved/Not Met 
Goal: *Demonstrates progressive activity Outcome: Resolved/Not Met 
Goal: Activity/Safety Outcome: Resolved/Not Met 
Goal: Consults, if ordered Outcome: Resolved/Not Met 
Goal: Diagnostic Test/Procedures Outcome: Resolved/Not Met 
Goal: Nutrition/Diet Outcome: Resolved/Not Met 
Goal: Discharge Planning Outcome: Resolved/Not Met 
Goal: Medications Outcome: Resolved/Not Met 
Goal: Respiratory Outcome: Resolved/Not Met 
Goal: Treatments/Interventions/Procedures Outcome: Resolved/Not Met 
Goal: Psychosocial 
Outcome: Resolved/Not Met Problem: Sepsis: Day 4 Goal: Off Pathway (Use only if patient is Off Pathway) Outcome: Resolved/Not Met 
Goal: Activity/Safety Outcome: Resolved/Not Met 
Goal: Consults, if ordered Outcome: Resolved/Not Met 
Goal: Diagnostic Test/Procedures Outcome: Resolved/Not Met 
Goal: Nutrition/Diet Outcome: Resolved/Not Met 
Goal: Discharge Planning Outcome: Resolved/Not Met 
Goal: Medications Outcome: Resolved/Not Met 
Goal: Respiratory Outcome: Resolved/Not Met 
Goal: Treatments/Interventions/Procedures Outcome: Resolved/Not Met 
Goal: Psychosocial 
 Outcome: Resolved/Not Met 
Goal: *Oxygen saturation within defined limits Outcome: Resolved/Not Met 
Goal: *Hemodynamically stable Outcome: Resolved/Not Met 
Goal: *Vital signs within defined limits Outcome: Resolved/Not Met 
Goal: *Tolerating diet Outcome: Resolved/Not Met 
Goal: *Demonstrates progressive activity Outcome: Resolved/Not Met 
Goal: *Fluid volume maintenance Outcome: Resolved/Not Met Problem: Sepsis: Day 5 Goal: Off Pathway (Use only if patient is Off Pathway) Outcome: Resolved/Not Met 
Goal: *Oxygen saturation within defined limits Outcome: Resolved/Not Met 
Goal: *Vital signs within defined limits Outcome: Resolved/Not Met 
Goal: *Tolerating diet Outcome: Resolved/Not Met 
Goal: *Demonstrates progressive activity Outcome: Resolved/Not Met 
Goal: *Discharge plan identified Outcome: Resolved/Not Met 
Goal: Activity/Safety Outcome: Resolved/Not Met 
Goal: Consults, if ordered Outcome: Resolved/Not Met 
Goal: Diagnostic Test/Procedures Outcome: Resolved/Not Met 
Goal: Nutrition/Diet Outcome: Resolved/Not Met 
Goal: Discharge Planning Outcome: Resolved/Not Met 
Goal: Medications Outcome: Resolved/Not Met 
Goal: Respiratory Outcome: Resolved/Not Met 
Goal: Treatments/Interventions/Procedures Outcome: Resolved/Not Met 
Goal: Psychosocial 
Outcome: Resolved/Not Met Problem: Sepsis: Day 6 Goal: Off Pathway (Use only if patient is Off Pathway) Outcome: Resolved/Not Met 
Goal: *Oxygen saturation within defined limits Outcome: Resolved/Not Met 
Goal: *Vital signs within defined limits Outcome: Resolved/Not Met 
Goal: *Tolerating diet Outcome: Resolved/Not Met 
Goal: *Demonstrates progressive activity Outcome: Resolved/Not Met 
Goal: Influenza immunization Outcome: Resolved/Not Met 
Goal: *Pneumococcal immunization Outcome: Resolved/Not Met 
Goal: Activity/Safety Outcome: Resolved/Not Met 
Goal: Diagnostic Test/Procedures Outcome: Resolved/Not Met 
Goal: Nutrition/Diet Outcome: Resolved/Not Met 
Goal: Discharge Planning Outcome: Resolved/Not Met 
Goal: Medications Outcome: Resolved/Not Met 
Goal: Respiratory Outcome: Resolved/Not Met 
Goal: Treatments/Interventions/Procedures Outcome: Resolved/Not Met 
Goal: Psychosocial 
Outcome: Resolved/Not Met Problem: Sepsis: Discharge Outcomes Goal: *Vital signs within defined limits Outcome: Resolved/Not Met 
Goal: *Tolerating diet Outcome: Resolved/Not Met 
Goal: *Verbalizes understanding and describes prescribed diet Outcome: Resolved/Not Met 
Goal: *Demonstrates progressive activity Outcome: Resolved/Not Met 
Goal: *Describes follow-up/return visits to physicians Outcome: Resolved/Not Met 
Goal: *Verbalizes name, dosage, time, side effects, and number of days to continue medications Outcome: Resolved/Not Met 
Goal: *Influenza immunization (Oct-Mar only) Outcome: Resolved/Not Met 
Goal: *Pneumococcal immunization Outcome: Resolved/Not Met 
Goal: *Lungs clear or at baseline Outcome: Resolved/Not Met 
Goal: *Oxygen saturation returns to baseline or 90% or better on room air Outcome: Resolved/Not Met 
Goal: *Glycemic control Outcome: Resolved/Not Met 
Goal: *Absence of deep venous thrombosis signs and symptoms(Stroke Metric) Outcome: Resolved/Not Met 
Goal: *Describes available resources and support systems Outcome: Resolved/Not Met 
Goal: *Optimal pain control at patient's stated goal 
Outcome: Resolved/Not Met

## 2020-09-11 NOTE — ROUTINE PROCESS
Bedside shift change report given to Romel (oncoming nurse) by Ino Nguyễn (offgoing nurse). Report included the following information SBAR, Kardex, Intake/Output, MAR and Recent Results.

## 2020-09-11 NOTE — PROGRESS NOTES
Pharmacist Note  Warfarin Dosing Consult provided for this 60 y. o.female to manage warfarin for Atrial Fibrillation INR Goal: 2 - 3 Home regimen/ tablet size: 2 mg PO daily Drugs that may increase INR: None Drugs that may decrease INR: None Other current anticoagulants/ drugs that may increase bleeding risk: Sertraline Risk factors: None Daily INR ordered: YES Recent Labs  
  09/11/20 
0547 09/10/20 
0603 09/09/20 
0440 INR 2.5* 3.0* 2.6* Date               INR                  Dose 8/27  --  3 mg  
8/28                1.1                   3 mg 
8/29                1.1                   3 mg 
8/30                1.1                   3 mg 
8/31                1.1                   6 mg 
9/1                  1.3                   5 mg 
9/2                  1.4                   5 mg 
9/3                  1.7                   5 mg 
9/4                  2.4                   Held by MD 
9/5                  2.5                   Held by MD 
9/6                  2.7                   Held by MD 
9/7                  2.3                   Hold 9/8                  2.3                   2.5 mg 
9/9                  2.6                   2 mg 9/10                3                      hold 9/11                2.5                   1 MG Assessment/ Plan: 
Warfarin 1 mg today. Pharmacy will continue to monitor daily and adjust therapy as indicated. Please contact the pharmacist at  for outpatient recommendations if needed.

## 2020-09-11 NOTE — PROGRESS NOTES
:CM noted patient has not discharged yet, and contacted [facility]. CM spoke to [facility] with [facility] and she stated the transport was placed on \"WILL CALL\". CM reviewed referral and pickup is confirmed for 1500. [facility] insisted that pickup is not available until  9:30 p.m. CM notified  TANJA. RN and patient aware.  
 
[facility]Sum[facility]

## 2020-09-11 NOTE — PROGRESS NOTES
Problem: Falls - Risk of 
Goal: *Absence of Falls Description: Document Laura Villegas Fall Risk and appropriate interventions in the flowsheet. Outcome: Progressing Towards Goal 
Note: Fall Risk Interventions: 
Mobility Interventions: Patient to call before getting OOB Medication Interventions: Patient to call before getting OOB Elimination Interventions: Call light in reach Problem: Pain Goal: *Control of Pain Outcome: Progressing Towards Goal 
  
Problem: Pressure Injury - Risk of 
Goal: *Prevention of pressure injury Description: Document Valdemar Scale and appropriate interventions in the flowsheet. Outcome: Progressing Towards Goal 
Note: Pressure Injury Interventions: 
Sensory Interventions: Assess changes in LOC, Assess need for specialty bed, Keep linens dry and wrinkle-free, Maintain/enhance activity level, Minimize linen layers Moisture Interventions: Absorbent underpads, Apply protective barrier, creams and emollients Activity Interventions: Increase time out of bed, Pressure redistribution bed/mattress(bed type) Mobility Interventions: Pressure redistribution bed/mattress (bed type) Nutrition Interventions: Document food/fluid/supplement intake Friction and Shear Interventions: Minimize layers

## 2020-09-14 NOTE — PROGRESS NOTES
Transition of care outreach postponed for 7 days due to patient's discharge to inpatient rehab facility. 8/20-9/11/20 chest pain orange D/C [facility] f/u 7d

## 2020-09-21 NOTE — PROGRESS NOTES
Transition of care outreach postponed for 7 days due to patient's discharge to inpatient rehab facility. D/C to [facility] 9/11/20 still admitted [facility] f/u chelsey

## 2020-09-26 NOTE — DISCHARGE INSTRUCTIONS
Patient Education        Atrial Fibrillation: Care Instructions  Your Care Instructions     Atrial fibrillation is an irregular and often fast heartbeat. Treating this condition is important for several reasons. It can cause blood clots, which can travel from your heart to your brain and cause a stroke. If you have a fast heartbeat, you may feel lightheaded, dizzy, and weak. An irregular heartbeat can also increase your risk for heart failure. Atrial fibrillation is often the result of another heart condition, such as high blood pressure or coronary artery disease. Making changes to improve your heart condition will help you stay healthy and active. Follow-up care is a key part of your treatment and safety. Be sure to make and go to all appointments, and call your doctor if you are having problems. It's also a good idea to know your test results and keep a list of the medicines you take. How can you care for yourself at home? Medicines    · Take your medicines exactly as prescribed. Call your doctor if you think you are having a problem with your medicine. You will get more details on the specific medicines your doctor prescribes.     · If your doctor has given you a blood thinner to prevent a stroke, be sure you get instructions about how to take your medicine safely. Blood thinners can cause serious bleeding problems.     · Do not take any vitamins, over-the-counter drugs, or herbal products without talking to your doctor first.   Lifestyle changes    · Do not smoke. Smoking can increase your chance of a stroke and heart attack. If you need help quitting, talk to your doctor about stop-smoking programs and medicines. These can increase your chances of quitting for good.     · Eat a heart-healthy diet.     · Stay at a healthy weight. Lose weight if you need to.     · Limit alcohol to 2 drinks a day for men and 1 drink a day for women. Too much alcohol can cause health problems.     · Avoid colds and flu.  Get a pneumococcal vaccine shot. If you have had one before, ask your doctor whether you need another dose. Get a flu shot every year. If you must be around people with colds or flu, wash your hands often. Activity    · If your doctor recommends it, get more exercise. Walking is a good choice. Bit by bit, increase the amount you walk every day. Try for at least 30 minutes on most days of the week. You also may want to swim, bike, or do other activities. Your doctor may suggest that you join a cardiac rehabilitation program so that you can have help increasing your physical activity safely.     · Start light exercise if your doctor says it is okay. Even a small amount will help you get stronger, have more energy, and manage stress. Walking is an easy way to get exercise. Start out by walking a little more than you did in the hospital. Gradually increase the amount you walk.     · When you exercise, watch for signs that your heart is working too hard. You are pushing too hard if you cannot talk while you are exercising. If you become short of breath or dizzy or have chest pain, sit down and rest immediately.     · Check your pulse regularly. Place two fingers on the artery at the palm side of your wrist, in line with your thumb. If your heartbeat seems uneven or fast, talk to your doctor. When should you call for help? Call 911 anytime you think you may need emergency care. For example, call if:    · You have symptoms of a heart attack. These may include:  ? Chest pain or pressure, or a strange feeling in the chest.  ? Sweating. ? Shortness of breath. ? Nausea or vomiting. ? Pain, pressure, or a strange feeling in the back, neck, jaw, or upper belly or in one or both shoulders or arms. ? Lightheadedness or sudden weakness. ? A fast or irregular heartbeat. After you call 911, the  may tell you to chew 1 adult-strength or 2 to 4 low-dose aspirin. Wait for an ambulance.  Do not try to drive yourself.     · You have symptoms of a stroke. These may include:  ? Sudden numbness, tingling, weakness, or loss of   Patient Education        Anemia: Care Instructions  Your Care Instructions     Anemia is a low level of red blood cells, which carry oxygen throughout your body. Many things can cause anemia. Lack of iron is one of the most common causes. Your body needs iron to make hemoglobin, a substance in red blood cells that carries oxygen from the lungs to your body's cells. Without enough iron, the body produces fewer and smaller red blood cells. As a result, your body's cells do not get enough oxygen, and you feel tired and weak. And you may have trouble concentrating. Bleeding is the most common cause of a lack of iron. You may have heavy menstrual bleeding or bleeding caused by conditions such as ulcers, hemorrhoids, or cancer. Regular use of aspirin or other anti-inflammatory medicines (such as ibuprofen) also can cause bleeding in some people. A lack of iron in your diet also can cause anemia, especially at times when the body needs more iron, such as during pregnancy, infancy, and the teen years. Your doctor may have prescribed iron pills. It may take several months of treatment for your iron levels to return to normal. Your doctor also may suggest that you eat foods that are rich in iron, such as meat and beans. There are many other causes of anemia. It is not always due to a lack of iron. Finding the specific cause of your anemia will help your doctor find the right treatment for you. Follow-up care is a key part of your treatment and safety. Be sure to make and go to all appointments, and call your doctor if you are having problems. It's also a good idea to know your test results and keep a list of the medicines you take. How can you care for yourself at home? Take your medicines exactly as prescribed. Call your doctor if you think you are having a problem with your medicine.   If your doctor recommends iron pills, take them as directed:  Try to take the pills on an empty stomach about 1 hour before or 2 hours after meals. But you may need to take iron with food to avoid an upset stomach. Do not take antacids or drink milk or caffeine drinks (such as coffee, tea, or cola) at the same time or within 2 hours of the time that you take your iron. They can make it hard for your body to absorb the iron. Vitamin C (from food or supplements) helps your body absorb iron. Try taking iron pills with a glass of orange juice or some other food that is high in vitamin C, such as citrus fruits. Iron pills may cause stomach problems, such as heartburn, nausea, diarrhea, constipation, and cramps. Be sure to drink plenty of fluids, and include fruits, vegetables, and fiber in your diet each day. Iron pills often make your bowel movements dark or green. If you forget to take an iron pill, do not take a double dose of iron the next time you take a pill. Keep iron pills out of the reach of small children. An overdose of iron can be very dangerous. Follow your doctor's advice about eating iron-rich foods. These include red meat, shellfish, poultry, eggs, beans, raisins, whole-grain bread, and leafy green vegetables. Steam vegetables to help them keep their iron content. When should you call for help? Call 911 anytime you think you may need emergency care. For example, call if:    You have symptoms of a heart attack. These may include:  Chest pain or pressure, or a strange feeling in the chest.  Sweating. Shortness of breath. Nausea or vomiting. Pain, pressure, or a strange feeling in the back, neck, jaw, or upper belly or in one or both shoulders or arms. Lightheadedness or sudden weakness. A fast or irregular heartbeat. After you call 911, the  may tell you to chew 1 adult-strength or 2 to 4 low-dose aspirin. Wait for an ambulance. Do not try to drive yourself.     You passed out (lost consciousness).    Call your doctor now or seek immediate medical care if:    You have new or increased shortness of breath.     You are dizzy or lightheaded, or you feel like you may faint.     Your fatigue and weakness continue or get worse.     You have any abnormal bleeding, such as:  Nosebleeds. Vaginal bleeding that is different (heavier, more frequent, at a different time of the month) than what you are used to. Bloody or black stools, or rectal bleeding. Bloody or pink urine. Watch closely for changes in your health, and be sure to contact your doctor if:    You do not get better as expected. Where can you learn more? Go to http://dona-windy.info/  Enter R301 in the search box to learn more about \"Anemia: Care Instructions. \"  Current as of: November 8, 2019               Content Version: 12.6  © 5687-8479 [employer]. Care instructions adapted under license by [employer] (which disclaims liability or warranty for this information). If you have questions about a medical condition or this instruction, always ask your healthcare professional. [place] [address] any warranty or liability for your use of this information. ? Patient Education        Chronic Kidney Disease: Care Instructions  Your Care Instructions     Chronic kidney disease happens when your kidneys don't work as well as they should. Your kidneys have a few important jobs. They remove waste from your blood. This waste leaves your body in your urine. They also balance your body's fluids and chemicals. When your kidneys don't work well, extra waste and fluid can build up. This can poison the body and sometimes cause death. The most common causes of this disease are diabetes and high blood pressure. In some cases, the disease develops in 2 to 3 months. But it usually develops over many years.   If you take medicine and make healthy changes to your lifestyle, you may be able to prevent the disease from getting worse. But if your kidney damage gets worse, you may need dialysis or a kidney transplant. Dialysis uses a machine to filter waste from the blood. A transplant is surgery to give you a healthy kidney from another person. Follow-up care is a key part of your treatment and safety. Be sure to make and go to all appointments, and call your doctor if you are having problems. It's also a good idea to know your test results and keep a list of the medicines you take. How can you care for yourself at home? Treatments and appointments    Be safe with medicines. Take your medicines exactly as prescribed. Call your doctor if you have any problems with your medicine. You also may take medicine to control your blood pressure or to treat diabetes. Many people who have diabetes take blood pressure medicine.     If you have diabetes, do your best to keep your blood sugar in your target range. You may do this by eating healthy food and exercising. You may also take medicines.     Go to your dialysis appointments if you have this treatment.     Do not take ibuprofen (Advil, Motrin), naproxen (Aleve), or similar medicines, unless your doctor tells you to. These may make the disease worse.     Do not take any vitamins, over-the-counter medicines, or herbal products without talking to your doctor first.     Do not smoke or use other tobacco products. Smoking can reduce blood flow to the kidneys. If you need help quitting, talk to your doctor about stop-smoking programs and medicines. These can increase your chances of quitting for good.     Do not drink alcohol or use illegal drugs.     Talk to your doctor about an exercise plan. Exercise helps lower your blood pressure. It also makes you feel better.     If you have an advance directive, let your doctor know. It may include a living will and a durable power of  for health care. If you don't have one, you may want to prepare one.  It lets your doctor and loved ones know your health care wishes if you become unable to speak for yourself. Diet    Talk to a registered dietitian. He or she can help you make a meal plan that is right for you. Most people with kidney disease need to limit salt (sodium), fluids, and protein. Some also have to limit potassium and phosphorus.     You may have to give up many foods you like. But try to focus on the fact that this will help you stay healthy for as long as possible.     If you have a hard time eating enough, talk to your doctor or dietitian about ways to add calories to your diet.     Your diet may change as your disease changes. See your doctor for regular testing. And work with a dietitian to change your diet as needed. When should you call for help? Call 911 anytime you think you may need emergency care. For example, call if:    You passed out (lost consciousness). Call your doctor now or seek immediate medical care if:    You have less urine than normal or no urine.     You have trouble urinating or can urinate only very small amounts.     You are confused or have trouble thinking clearly.     You feel weaker or more tired than usual.     You are very thirsty, lightheaded, or dizzy.     You have nausea and vomiting.     You have new swelling of your arms or feet, or your swelling is worse.     You have blood in your urine.     You have new or worse trouble breathing. Watch closely for changes in your health, and be sure to contact your doctor if:    You have any problems with your medicine or other treatment. Where can you learn more? Go to http://www.gray.com/  Enter N276 in the search box to learn more about \"Chronic Kidney Disease: Care Instructions. \"  Current as of: April 15, 2020               Content Version: 12.6  © 0704-5825 [employer]. Care instructions adapted under license by [facility] (which disclaims liability or warranty for this information).  If you have questions about a medical condition or this instruction, always ask your healthcare professional. [address] any warranty or liability for your use of this information. ? movement in your face, arm, or leg, especially on only one side of your body. ? Sudden vision changes. ? Sudden trouble speaking. ? Sudden confusion or trouble understanding simple statements. ? Sudden problems with walking or balance. ? A sudden, severe headache that is different from past headaches.     · You passed out (lost consciousness). Call your doctor now or seek immediate medical care if:    · You have new or increased shortness of breath.     · You feel dizzy or lightheaded, or you feel like you may faint.     · Your heart rate becomes irregular.     · You can feel your heart flutter in your chest or skip heartbeats. Tell your doctor if these symptoms are new or worse. Watch closely for changes in your health, and be sure to contact your doctor if you have any problems. Where can you learn more? Go to http://dona-windy.info/  Enter U020 in the search box to learn more about \"Atrial Fibrillation: Care Instructions. \"  Current as of: December 16, 2019               Content Version: 12.6  © 8195-1150 [employer]. Care instructions adapted under license by [employer] (which disclaims liability or warranty for this information). If you have questions about a medical condition or this instruction, always ask your healthcare professional. [address] any warranty or liability for your use of this information.

## 2020-09-26 NOTE — PROGRESS NOTES
Sent for pt for STAT V/Q scan. Perfusion only scan will be done only. No Covid 19 negative test at this time.

## 2020-09-26 NOTE — ED NOTES
Wolf Torres RN reviewed discharge instructions with the patient. The patient verbalized understanding. All questions and concerns were addressed. The patient is discharged via wheelchair in the care of family members with instructions and prescriptions in hand. Pt is alert and oriented x 4. Respirations are clear and unlabored.

## 2020-09-27 NOTE — ED PROVIDER NOTES
EMERGENCY DEPARTMENT HISTORY AND PHYSICAL EXAM 
 
 
Date: 9/26/2020 Patient Name: Priya Sarabia History of Presenting Illness Chief Complaint Patient presents with  Chest Pain  
  pt c/o sudden onset of CP this moring. hx of baseline SOB , worse today, tachypneic in triage. Pt also has hx of AFIB with RVR, pt states pacemaker was recently removed d/t an infection that spread in her body.  Shortness of Breath  Irregular Heart Beat History Provided By: Patient HPI: Priya Sarabia, 60 y.o. female with PMHx as noted below presents emergency department with complaints of chest pain, shortness of breath and palpitations. Patient had onset earlier this morning with a mild midsternal chest pressure that was constant and did not radiate. There were no other relieving or exacerbating factors to the symptoms. She reports having palpitations and feeling that her atrial fibrillation is out of control. She also felt dyspneic during this time that was worse with exertion. Patient notes that she had a pacemaker removed secondary to an infection and states she is having difficulty controlling her atrial fibrillation since. She otherwise is denying any fevers, chills, cough, nausea, vomiting, diarrhea, abdominal pain. Patient notes she is compliant with her Coumadin. PCP: Geovanna Mendez MD 
 
Current Outpatient Medications Medication Sig Dispense Refill  risperidone (RISPERDAL PO) Take  by mouth.  dilTIAZem ER (CARDIZEM LA) 240 mg tablet Take 1 Tab by mouth daily. 30 Tab 0  
 bumetanide (BUMEX) 0.5 mg tablet Take 1 Tab by mouth two (2) times a day for 30 days. 60 Tab 0  
 insulin glargine (Lantus Solostar U-100 Insulin) 100 unit/mL (3 mL) inpn 10 Units by SubCUTAneous route Daily (before breakfast).  sertraline (Zoloft) 50 mg tablet Take 75 mg by mouth daily.  warfarin (COUMADIN) 2 mg tablet Take 1 tablet po daily 50 Tab 5  hydrALAZINE (APRESOLINE) 100 mg tablet Take 1 Tab by mouth three (3) times daily. 90 Tab 11  
 busPIRone (BUSPAR) 10 mg tablet TAKE ONE TABLET BY MOUTH TWICE A DAY 60 Tab 10 Past History Past Medical History: 
Past Medical History:  
Diagnosis Date  Acquired lymphedema Right arm  Arrhythmia  Asthma  Atrial fibrillation ([facility] Dr. Diane Fulton and Dr. [facility])  Cancer ([address] bilateral breast  
 Chronic kidney disease  Diabetes mellitus type II   
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 ([facility] 3/3/2020  Dizziness  Essential hypertension  Hyperlipidemia  Hypertension  Long term (current) use of anticoagulants  Morbid obesity ([facility] 3/14/2012  Nausea & vomiting  Neuropathy  Osteoarthritis  Pacemaker  Sick sinus syndrome ([facility]  Type 2 diabetes mellitus with left diabetic foot infection ([facility] 10/29/2019 Past Surgical History: 
Past Surgical History:  
Procedure Laterality Date  ABDOMEN SURGERY PROC UNLISTED    
 gastric bypass  GASTRIC BYPASS,OBESE<150CM SAW-EN-Y  7/08  
 [facility]  HX AFIB ABLATION    
 HX BREAST RECONSTRUCTION Bilateral   
 HX CARPAL TUNNEL RELEASE [address] RIGHT MODIFIED RADICAL   
 HX MASTECTOMY Left   
 no lymphnode removal  
 HX MOHS PROCEDURES  1995  
 right  HX ORTHOPAEDIC Right   
 knee surgery  HX PACEMAKER    
 HX PACEMAKER    
 IR INSERT TUNL CVC W PORT OVER 5 YEARS    
 IR INSERT TUNL CVC W/O PORT OVER 5 YR  5/4/2020  IR INSERT TUNL CVC W/O PORT OVER 5 YR  9/8/2020  IR REMOVE TUNL CVAD W/O PORT / PUMP  6/26/2020  [address]  8.21.07  
 OR [address] AND 1994  MO RELOCATION OF SKIN POCKET FOR PACEMAKER N/A 4/24/2020 RELOCATE [address] performed by Arturo Bello MD at [address] CATH LAB  MO RMVL TRANSVNS PM ELTRD 1 LEAD SYS ATR/VENTR N/A 4/24/2020 Remove Pacemaker Dual Leads performed by Arturo Bello MD at [facility] CATH LAB  MO RMVL TRANSVNS PM ELTRD 1 LEAD SYS ATR/VENTR N/A 4/27/2020 REMOVE PACEMAKER DUAL LEADS performed by Arturo Bello MD at [address] CATH LAB  MO TRABECULOPLASTY BY LASER SURGERY    
 US GUIDE ASP OVARIAN CYST ABD APPROACH  8.21.07  
 RIGHT Family History: 
Family History Problem Relation Age of Onset  Cancer Mother  Heart Disease Mother  Alcohol abuse Father  Diabetes Brother  Hypertension Brother Social History: 
Social History Tobacco Use  Smoking status: Never Smoker  Smokeless tobacco: Never Used Substance Use Topics  Alcohol use: Not Currently  Drug use: No  
 
 
Allergies: Allergies Allergen Reactions  Ace Inhibitors Cough  Amlodipine Swelling  Avapro [Irbesartan] Unable to Obtain  Bactrim [Sulfamethoxazole-Trimethoprim] Other (comments) Sore mouth  Captopril Cough  Carvedilol Diarrhea  Contrast Agent [Iodine] Itching [address]  Morphine Nausea and Vomiting and Swelling  Penicillins Hives Did not tolerate cefepime 3/2020  Pravachol [Pravastatin] Nausea Only  Seafood [Shellfish Containing Products] Hives  Singulair [Montelukast] Other (comments)  
  palpitations Review of Systems Review of Systems Constitutional: Negative for fever, chills, and fatigue. HENT: Negative for congestion, sore throat, rhinorrhea, sneezing and neck stiffness Eyes: Negative for discharge and redness. Respiratory: Positive for  shortness of breath. Negative wheezing Cardiovascular: Positive e for chest pain, palpitations Gastrointestinal: Negative for nausea, vomiting, abdominal pain, constipation, diarrhea and blood in stool. Genitourinary: Negative for dysuria, hematuria, flank pain, decreased urine volume, discharge, Musculoskeletal: Negative for myalgias or joint pain . Skin: Negative for rash or lesions . Neurological: Negative weakness, light-headedness, numbness and headaches. Physical Exam  
Physical Exam 
 
GENERAL: alert and oriented, no acute distress EYES: PEERL, No injection, discharge or icterus. ENT: Mucous membranes pink and moist. 
NECK: Supple LUNGS: Airway patent. Mildly labored respirations but clear lungs noted bilaterally HEART: Tachycardic, irregularly irregular rhythm ABDOMEN: Non-distended and non-tender, without guarding or rebound. SKIN:  warm, dry MSK/EXTREMITIES: Without swelling, tenderness or deformity, symmetric with normal ROM 
NEUROLOGICAL: Alert, oriented Diagnostic Study Results Labs - Recent Results (from the past 12 hour(s)) EKG, 12 LEAD, INITIAL Collection Time: 09/26/20 10:39 AM  
Result Value Ref Range Ventricular Rate 121 BPM  
 Atrial Rate 101 BPM  
 QRS Duration 116 ms  
 Q-T Interval 352 ms QTC Calculation (Bezet) 499 ms Calculated R Axis -29 degrees Calculated T Axis 118 degrees Diagnosis Atrial fibrillation with rapid ventricular response ST & T wave abnormality, consider lateral ischemia When compared with ECG of 19-AUG-2020 17:30, No significant change was found CBC WITH AUTOMATED DIFF Collection Time: 09/26/20 11:57 AM  
Result Value Ref Range WBC 8.6 3.6 - 11.0 K/uL  
 RBC 3.02 (L) 3.80 - 5.20 M/uL HGB 8.5 (L) 11.5 - 16.0 g/dL HCT 28.1 (L) 35.0 - 47.0 % MCV 93.0 80.0 - 99.0 FL  
 MCH 28.1 26.0 - 34.0 PG  
 MCHC 30.2 30.0 - 36.5 g/dL  
 RDW 15.2 (H) 11.5 - 14.5 % PLATELET 653 754 - 151 K/uL MPV 10.1 8.9 - 12.9 FL  
 NRBC 0.0 0  WBC ABSOLUTE NRBC 0.00 0.00 - 0.01 K/uL NEUTROPHILS 80 (H) 32 - 75 % LYMPHOCYTES 9 (L) 12 - 49 % MONOCYTES 8 5 - 13 % EOSINOPHILS 1 0 - 7 % BASOPHILS 1 0 - 1 % IMMATURE GRANULOCYTES 1 (H) 0.0 - 0.5 % ABS. NEUTROPHILS 6.8 1.8 - 8.0 K/UL  
 ABS. LYMPHOCYTES 0.8 0.8 - 3.5 K/UL  
 ABS. MONOCYTES 0.7 0.0 - 1.0 K/UL  
 ABS. EOSINOPHILS 0.1 0.0 - 0.4 K/UL  
 ABS. BASOPHILS 0.1 0.0 - 0.1 K/UL  
 ABS. IMM. GRANS. 0.1 (H) 0.00 - 0.04 K/UL  
 DF SMEAR SCANNED    
 RBC COMMENTS NORMOCYTIC, NORMOCHROMIC METABOLIC PANEL, COMPREHENSIVE Collection Time: 09/26/20 11:57 AM  
Result Value Ref Range Sodium 142 136 - 145 mmol/L Potassium 3.2 (L) 3.5 - 5.1 mmol/L Chloride 110 (H) 97 - 108 mmol/L  
 CO2 24 21 - 32 mmol/L Anion gap 8 5 - 15 mmol/L Glucose 116 (H) 65 - 100 mg/dL BUN 21 (H) 6 - 20 MG/DL Creatinine 2.15 (H) 0.55 - 1.02 MG/DL  
 BUN/Creatinine ratio 10 (L) 12 - 20 GFR est AA 28 (L) >60 ml/min/1.73m2 GFR est non-AA 23 (L) >60 ml/min/1.73m2 Calcium 8.5 8.5 - 10.1 MG/DL Bilirubin, total 0.4 0.2 - 1.0 MG/DL  
 ALT (SGPT) 12 12 - 78 U/L  
 AST (SGOT) 17 15 - 37 U/L Alk. phosphatase 87 45 - 117 U/L Protein, total 6.9 6.4 - 8.2 g/dL Albumin 2.9 (L) 3.5 - 5.0 g/dL Globulin 4.0 2.0 - 4.0 g/dL A-G Ratio 0.7 (L) 1.1 - 2.2 CK W/ REFLX CKMB Collection Time: 09/26/20 11:57 AM  
Result Value Ref Range CK 39 26 - 192 U/L  
TROPONIN I Collection Time: 09/26/20 11:57 AM  
Result Value Ref Range Troponin-I, Qt. <0.05 <0.05 ng/mL PROTHROMBIN TIME + INR Collection Time: 09/26/20 11:57 AM  
Result Value Ref Range INR 1.8 (H) 0.9 - 1.1 Prothrombin time 18.2 (H) 9.0 - 11.1 sec MAGNESIUM Collection Time: 09/26/20 11:57 AM  
Result Value Ref Range Magnesium 2.4 1.6 - 2.4 mg/dL NT-PRO BNP Collection Time: 09/26/20 11:57 AM  
Result Value Ref Range NT pro-BNP 18,392 (H) <125 PG/ML  
URINALYSIS W/ REFLEX CULTURE Collection Time: 09/26/20 12:39 PM  
 Specimen: Urine Result Value Ref Range Color YELLOW/STRAW Appearance CLEAR CLEAR Specific gravity 1.005 1.003 - 1.030    
 pH (UA) 7.0 5.0 - 8.0 Protein TRACE (A) NEG mg/dL Glucose Negative NEG mg/dL Ketone Negative NEG mg/dL Bilirubin Negative NEG Blood Negative NEG Urobilinogen 0.2 0.2 - 1.0 EU/dL Nitrites Negative NEG Leukocyte Esterase Negative NEG    
 WBC 0-4 0 - 4 /hpf  
 RBC 0-5 0 - 5 /hpf Epithelial cells FEW FEW /lpf Bacteria Negative NEG /hpf  
 UA:UC IF INDICATED CULTURE NOT INDICATED BY UA RESULT CNI Hyaline cast 0-2 0 - 5 /lpf SARS-COV-2 Collection Time: 09/26/20  2:14 PM  
Result Value Ref Range Specimen source Nasopharyngeal    
 Specimen source Nasopharyngeal    
 COVID-19 rapid test Not detected NOTD Specimen type NP Swab Health status Symptomatic Testing Radiologic Studies -  
NM LUNG SCAN PERF Final Result IMPRESSION: Low probability for pulmonary embolism. XR CHEST PORT Final Result IMPRESSION: No acute cardiopulmonary process CT Results  (Last 48 hours) None CXR Results  (Last 48 hours) 09/26/20 1054  XR CHEST PORT Final result Impression:  IMPRESSION: No acute cardiopulmonary process Narrative:  EXAM: XR CHEST PORT INDICATION: chest pain COMPARISON: 4/20/2020 FINDINGS: A portable AP radiograph of the chest was obtained at 1052 hours. Left IJ Port-A-Cath. Cardiac monitoring leads. The lungs are clear. The cardiac and  
mediastinal contours and pulmonary vascularity are normal.  The bones and soft  
tissues are grossly within normal limits. Scattered clips over the bilateral  
lung bases Medical Decision Making Lincoln Nunez MD am the first provider for this patient and am the attending of record for this patient encounter.  
 
I reviewed the vital signs, available nursing notes, past medical history, past surgical history, family history and social history. Vital Signs-Reviewed the patient's vital signs. Patient Vitals for the past 12 hrs: 
 Temp Pulse Resp BP SpO2  
09/26/20 1600  (!) 112 13 (!) 167/84 97 % 09/26/20 1530  (!) 109 21 (!) 169/89 97 % 09/26/20 1518  (!) 107 11  97 % 09/26/20 1504  (!) 112 18 (!) 166/88 97 % 09/26/20 1402  (!) 111 26 (!) 163/96 95 % 09/26/20 1330  (!) 112 23 (!) 157/87 96 %  
09/26/20 1315  (!) 108 18  96 %  
09/26/20 1300  (!) 106 14 (!) 165/90 97 % 09/26/20 1245  (!) 111 18  97 % 09/26/20 1230  (!) 113 19 (!) 155/79 97 % 09/26/20 1215  (!) 103 14  96 %  
09/26/20 1200  (!) 106 16 (!) 154/88 96 %  
09/26/20 1145  (!) 110 13  97 % 09/26/20 1138     96 %  
09/26/20 1130  (!) 103 16 (!) 134/94 97 % 09/26/20 1115  (!) 103 15  97 % 09/26/20 1100  (!) 114 26 112/85 98 %  
09/26/20 1048 98.1 °F (36.7 °C) (!) 113 25 (!) 141/67 99 % Pulse Oximetry Analysis - 97% on RA Cardiac Monitor:  
Rate: 122 bpm 
Rhythm: Atrial Fibrillation EKG interpretation: (Preliminary) Rhythm: Atrial fibrillation with rapid ventricular response. Rate (approx.): 121; Axis: normal;  normal; QRS interval: normal ; ST/T wave: Nonspecific abnormalities, no significant change from previous;  was interpreted by Tyler Chang MD,ED Provider. Records Reviewed: Nursing Notes and Old Medical Records Provider Notes (Medical Decision Making): This is a 9year-old female presenting with chest pain, palpitations and dyspnea since this morning. On arrival she was tachycardic with labored respirations. EKG showed atrial fibrillation with a rapid ventricular response. Initially uncertain if this rapid rate was secondary to uncontrolled A. fib versus other underlying pathology causing the tachycardia.   Given her dyspnea I did consider ACS, pulmonary embolism, pneumothorax, aortic dissection, COVID-19, pneumonia, anemia, metabolic abnormalities. X-ray obtained was clear. He also obtained a VQ scan which was low probability for pulmonary embolism. Patient was given IV fluids and a dose of diltiazem with some improvement in her tachycardia symptoms. Reevaluation notes that her dyspnea had resolved that she was feeling much better. I did recommended further observation and medication as her heart rate is still fast however she would like to decline any further care at this time and would like to be discharged. ED Course:  
Initial assessment performed. The patients presenting problems have been discussed, and they are in agreement with the care plan formulated and outlined with them. I have encouraged them to ask questions as they arise throughout their visit. Medications  
sodium chloride 0.9 % bolus infusion 1,000 mL (0 mL IntraVENous IV Completed 9/26/20 1628) dilTIAZem (CARDIZEM) injection 10 mg (10 mg IntraVENous Given 9/26/20 1338) technetium albumin aggregated (MAA) solution 4 millicurie (4 millicuries IntraVENous Given 9/26/20 1448) dilTIAZem IR (CARDIZEM) tablet 60 mg (60 mg Oral Given 9/26/20 1622) Progress: I informed the pt that she needed admission, further observation for adequate evaluation for her Rapid atrial fibrillation, and that by refusing the above, she is at risk for sudden death, further deterioration, coma. She is awake, alert, and she understands her condition and the risks involved in leaving. She is clinically aware of her surroundings and able to ask appropriate questions, the patients nurse present confirms she is not clinically intoxicated and able to make medical decisions. She verbalized knowing the risks and understood it was recommended that she stay and could also return at any time.   She was provided with warnings regarding worsening of her condition and were provided instructions to follow up with Sylvia Zamudio Jame Marie MD tomorrow or return to the Emergency Room as soon as possible. PROGRESS Kody Winn's  results have been reviewed with her. She has been counseled regarding her diagnosis. She verbally conveys understanding and agreement of the signs, symptoms, diagnosis, treatment and prognosis and additionally agrees to follow up as recommended with Dr. Jayda Ramon MD in 24 - 48 hours. She also agrees with the care-plan and conveys that all of her questions have been answered. I have also put together some discharge instructions for her that include: 1) educational information regarding their diagnosis, 2) how to care for their diagnosis at home, as well a 3) list of reasons why they would want to return to the ED prior to their follow-up appointment, should their condition change. Disposition: 
home PLAN: 
1. Discharge Medication List as of 9/26/2020  4:41 PM  
  
 
2. Follow-up Information Follow up With Specialties Details Why Contact Info Jayda Ramon MD Internal Medicine Schedule an appointment as soon as possible for a visit in 2 days  [address] [place] [address] Road [facility] [phone] Min Lin MD Cardiology, [address] [facility] Vascular Surgery, Internal Medicine Schedule an appointment as soon as possible for a visit in 2 days  [address] 
[phone] Return to ED if worse Diagnosis Clinical Impression: 1. Atrial fibrillation with rapid ventricular response ([address] 2. Chronic kidney disease, unspecified CKD stage 3. Dyspnea, unspecified type 4. Anemia, unspecified type Please note that this dictation was completed with Dragon, computer voice recognition software. Quite often unanticipated grammatical, syntax, homophones, and other interpretive errors are inadvertently transcribed by the computer software. Please disregard these errors.   Additionally, please excuse any errors that have escaped final proofreading.

## 2020-09-27 NOTE — TELEPHONE ENCOUNTER
9/27/20 5:57 PM--attempted to reach patient but she is not answering her phone at this time and her VM message does not include her name. Contacted her  in case she is with him right now but she is at home. Introduced myself to him and the purpose of my call and advised I will call her back on Tuesday afternoon. Also, advised him that the hospital staff will inform pt of her COVID-19 test result as they informed her they would in the ED visit and that once her final test is available and her providers have released it, she will be able to view it in [address] as well. I am sending our COVID-19 information and phone resources to her via [employer] for her reference. 9/29/20 5:31 PM--attempted to reach pt but she is not answering the phone at this time. This concludes this episode of care.

## 2020-10-07 NOTE — PROGRESS NOTES
Subjective:  
  
Vani Solorio is a 60 y.o. female presents for postop care . Procedure:  8/20/2020: I&D of right sternoclavicular joint application of wound Vac by Dr Marion Bower 8/24/2020: Wound closure by Dr Elly Benitez Appetite is good. Eating a regular diet without difficulty. Bowel movements are regular. The patient is voiding without difficulty. The patient is not having any pain. Castillo Villatoro Objective:  
 
Visit Vitals BP (!) 157/77 (BP 1 Location: Left arm, BP Patient Position: Sitting) Pulse (!) 104 Temp 97.9 °F (36.6 °C) (Oral) Resp 20 Ht 5' 10\" (1.778 m) Wt 224 lb (101.6 kg) SpO2 98% BMI 32.14 kg/m² Physical Exam:  GENERAL: alert, cooperative, no distress, appears stated age, LUNG: clear to auscultation bilaterally, HEART: regular rate and rhythm, S1, S2 normal, no murmur, click, rub or gallop, ABDOMEN: soft, non-tender. Bowel sounds normal. No masses,  no organomegaly, EXTREMITIES:  extremities normal, atraumatic, no cyanosis or edema, SKIN: Normal. and no rash or abnormalities Assessment:  
 
Patient Active Problem List  
Diagnosis Code  History of breast cancer Z85.3  Asthma J45.909  Fe deficiency anemia D50.9  Status post ablation of atrial fibrillation Z98.890, Z86.79  
 Sick sinus syndrome ([facility]) I49.5  S/P cardiac pacemaker procedure Z95.0  Long term (current) use of anticoagulants Z79.01  
 Mixed hyperlipidemia E78.2  CKD (chronic kidney disease), stage IV ([facility]) N18.4  Systolic murmur A64.5  Essential hypertension I10  
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  
 H/O bilateral mastectomy Z90.13  Left below-knee amputee [facility]) T47.339 Doing well postoperatively. Completed post surgical rehab at [facility] and is now home. Completed ABX therapy yesterday. She has full ROM of the right arm and is ambulating with a walker. Still maintaining minimal weight baring with that arm. Instructed her to limit weight baring on the right arm until the end of December. Plan/Recommendations/Medical Decision Making:  
 
 Asaf Eid will be seen again if needed We discussed the importance of proper diet and 30 minutes/day of proper exercise. All questions were personally answered. Thank you for allowing us to participate in the care of your patient. CHAR Christ[facility] Thoracic [facility]

## 2020-10-07 NOTE — PROGRESS NOTES
Follow up for Chest Wound Exploration on 8-. 
 
 
 
1. Have you been to the ER, urgent care clinic since your last visit? Hospitalized since your last visit? No 
 
2. Have you seen or consulted any other health care providers outside of the [address] [place] [address] since your last visit? Include any pap smears or colon screening.  No

## 2020-10-08 NOTE — TELEPHONE ENCOUNTER
Returned call to [place] at [facility], she wants to know if line is to continue or be pulled. Referred her to Dr. Sara Trivedi as she is the one who wrote orders on 10/05/2020.

## 2020-10-08 NOTE — TELEPHONE ENCOUNTER
Lakisha Fletcherly with [facility] would like to know the order on Oct.5 should they continue or if line going to be removed she can be reached @ [phone]

## 2020-10-08 NOTE — TELEPHONE ENCOUNTER
Huan Puga from [employer] called wanting to know if the patient is still needing the line or should it be pulled. Please call her back at [phone]

## 2020-10-09 NOTE — TELEPHONE ENCOUNTER
Writer conferred with provider, per provider:    If I remember correctly, I dont think she had a picc and had some other line like veronica catheter   So IR may have to remove it     Was that not done before she left any facility   Patient should have completed iv therapy 10/5/20 as per my last note   From ID standpoint, ok to remove line unless being used for anything     Thanks       Call placed to [employer] and made aware, need to request order for removal

## 2020-10-09 NOTE — PROGRESS NOTES
Spoke to [employer] as I was not able to reach patient on phone number listed     Completed IV antibiotics and line not being used for any other purpose to their knowledge    Placed order for IR to remove veronica/tunneled line

## 2020-10-09 NOTE — TELEPHONE ENCOUNTER
They provide the iv antibiotcs the orderended on the 5th do you want to continue or have line removed

## 2020-10-13 NOTE — PROGRESS NOTES
ELECTROPHYSIOLOGY Subjective:  
  
Emeli Looney is a 60 y.o. female is here for EP follow up. She has sob and was seen in the ER with AF with rvr. Patient Active Problem List  
 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, associated with type 2 diabetes mellitus ([address] 04/25/2018  Morbid obesity with BMI of 40.0-44.9, adult ([address] 05/01/2017  Controlled type 2 diabetes mellitus with stage 4 chronic kidney disease, with long-term current use of insulin ([address] 01/16/2017  PAF (paroxysmal atrial fibrillation) ([address] 11/28/2016  Anemia in stage 4 chronic kidney disease ([address] 11/28/2016  Essential hypertension 08/26/2016  Systolic murmur 51/66/3321  CKD (chronic kidney disease), stage IV ([address] 06/09/2012  Mixed hyperlipidemia 05/22/2012  Long term (current) use of anticoagulants 04/11/2012  S/P cardiac pacemaker procedure 04/03/2012  Sick sinus syndrome ([address] 04/02/2012  Status post ablation of atrial fibrillation 12/15/2011  Fe deficiency anemia 04/14/2010  
 History of breast cancer 04/13/2010  Asthma 04/13/2010 Samara Blanchard MD 
Past Medical History:  
Diagnosis Date  Acquired lymphedema Right arm  Arrhythmia  Asthma  Atrial fibrillation ([address] Dr. David Sampson and Dr. Kae alves [facility])  Cancer ([address] bilateral breast  
 Chronic kidney disease  Diabetes mellitus type II   
 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 ([address] 3/3/2020  Dizziness  Essential hypertension  Hyperlipidemia  Hypertension  Long term (current) use of anticoagulants  Morbid obesity ([address] 3/14/2012  Nausea & vomiting  Neuropathy  Osteoarthritis  Pacemaker  Sick sinus syndrome ([address]  Type 2 diabetes mellitus with left diabetic foot infection ([address] 10/29/2019 Past Surgical History:  
Procedure Laterality Date  ABDOMEN SURGERY PROC UNLISTED    
 gastric bypass  GASTRIC BYPASS,OBESE<150CM SAW-EN-Y  7/08  
 [facility]cher  HX AFIB ABLATION    
 HX AMPUTATION FOOT Left 04/2020  HX BREAST RECONSTRUCTION Bilateral   
 HX CARPAL TUNNEL RELEASE [id number] [address] RIGHT MODIFIED RADICAL   
 HX MASTECTOMY Left   
 no lymphnode removal  
 HX MOHS PROCEDURES  1995  
 right  HX ORTHOPAEDIC Right   
 knee surgery  HX PACEMAKER    
 HX PACEMAKER    
 IR INSERT TUNL CVC W PORT OVER 5 YEARS    
 IR INSERT TUNL CVC W/O PORT OVER 5 YR  5/4/2020  IR INSERT TUNL CVC W/O PORT OVER 5 YR  9/8/2020  IR REMOVE TUNL CVAD W/O PORT / PUMP  6/26/2020  [address]  8.21.07  
 MN A[address] 9462 AND 1994  MN RELOCATION OF SKIN POCKET FOR PACEMAKER N/A 4/24/2020 RELOCATE 2 [facility] performed by Harlan Burrell [place] at [address] [facility] CATH LAB  MN RMVL TRANSVNS PM ELTRD 1 LEAD SYS ATR/VENTR N/A 4/24/2020 Remove Pacemaker Dual Leads performed by Harlan Burrell MD at [facility] CATH LAB  MN RMVL TRANSVNS PM ELTRD 1 LEAD SYS ATR/VENTR N/A 4/27/2020 REMOVE PACEMAKER DUAL LEADS performed by Harlan Burrell MD at [address] [facility] CATH LAB  MN TRABECULOPLASTY BY LASER SURGERY  US GUIDE ASP OVARIAN CYST ABD APPROACH  8.21.07  
 RIGHT Allergies Allergen Reactions  Ace Inhibitors Cough  Amlodipine Swelling  Avapro [Irbesartan] Unable to Obtain  Bactrim [Sulfamethoxazole-Trimethoprim] Other (comments) Sore mouth  Captopril Cough  Carvedilol Diarrhea  Contrast Agent [Iodine] Itching [address]  Morphine Nausea and Vomiting and Swelling  Penicillins Hives Did not tolerate cefepime 3/2020  Pravachol [Pravastatin] Nausea Only  Seafood [Shellfish Containing Products] Hives  Singulair [Montelukast] Other (comments)  
  palpitations Family History Problem Relation Age of Onset  Cancer Mother  Heart Disease Mother  Alcohol abuse Father  Diabetes Brother  Hypertension Brother   
 negative for cardiac disease Social History Socioeconomic History  Marital status:  Spouse name: Not on file  Number of children: Not on file  Years of education: Not on file  Highest education level: Not on file Tobacco Use  Smoking status: Never Smoker  Smokeless tobacco: Never Used Substance and Sexual Activity  Alcohol use: Not Currently  Drug use: No  
 
Current Outpatient Medications Medication Sig  potassium chloride SR (Klor-Con 10) 10 mEq tablet Take  by mouth.  metoprolol tartrate (LOPRESSOR) 25 mg tablet Take 1 Tab by mouth two (2) times a day.  sertraline (ZOLOFT) 25 mg tablet Take 25 mg by mouth daily. Take with 50 mg to total 75 mg  bumetanide (BUMEX) 1 mg tablet Take 0.5 mg by mouth two (2) times a day.  dilTIAZem ER (CARDIZEM LA) 240 mg tablet Take 1 Tab by mouth daily.  insulin glargine (Lantus Solostar U-100 Insulin) 100 unit/mL (3 mL) inpn 10 Units by SubCUTAneous route Daily (before breakfast).  sertraline (Zoloft) 50 mg tablet Take 75 mg by mouth daily.   
 warfarin (COUMADIN) 2 mg tablet Take 1 tablet po daily (Patient taking differently: Take 2 mg by mouth daily. 2 mg M-W-FR and 1 all other days)  hydrALAZINE (APRESOLINE) 100 mg tablet Take 1 Tab by mouth three (3) times daily.  busPIRone (BUSPAR) 10 mg tablet TAKE ONE TABLET BY MOUTH TWICE A DAY  risperidone (RISPERDAL PO) Take  by mouth. No current facility-administered medications for this visit. Vitals:  
 10/13/20 1152 BP: (!) 150/72 Pulse: 93 Resp: 20 SpO2: 98% Weight: 242 lb 3.2 oz (109.9 kg) Height: 5' 10\" (1.778 m) I have reviewed the nurses notes, vitals, problem list, allergy list, medical history, family, social history and medications. Review of Symptoms: 
 
General: Pt denies excessive weight gain or loss. Pt is able to conduct ADL's HEENT: Denies blurred vision, headaches, epistaxis and difficulty swallowing. Respiratory: + shortness of breath, CHOW, denies wheezing or stridor. Cardiovascular: Denies precordial pain, palpitations, edema or PND Gastrointestinal: Denies poor appetite, indigestion, abdominal pain or blood in stool Urinary: Denies dysuria, pyuria Musculoskeletal: Denies pain or swelling from muscles or joints Neurologic: Denies tremor, paresthesias, or sensory motor disturbance Skin: Denies rash, itching or texture change. Psych: Denies depression Physical Exam:   
 
General: Well developed, in no acute distress. HEENT: Eyes - PERRL, no jvd Heart:  Normal S1/S2 negative S3 or S4. irr irr, no murmur, gallop or rub. Respiratory: Clear bilaterally x 4, no wheezing or rales Extremities:  No edema, normal cap refill, no cyanosis. Musculoskeletal: No clubbing Neuro: A&Ox3, speech clear, gait stable. Skin: Skin color is normal. No rashes or lesions. Non diaphoretic. no ulcers Vascular: 2+ pulses symmetric in all extremities Psych - judgement intact and orientation is wnl Cardiographics Ekg:  bpm.  
 
Results for orders placed or performed during the hospital encounter of 09/26/20 EKG, 12 LEAD, INITIAL Result Value Ref Range Ventricular Rate 121 BPM  
 Atrial Rate 101 BPM  
 QRS Duration 116 ms  
 Q-T Interval 352 ms QTC Calculation (Bezet) 499 ms Calculated R Axis -29 degrees Calculated T Axis 118 degrees Diagnosis Atrial fibrillation with rapid ventricular response ST & T wave abnormality, consider lateral ischemia When compared with ECG of 19-AUG-2020 17:30, No significant change was found Confirmed by Jade Chan ([id number]) on 9/27/2020 11:07:11 AM 
  
 
Echo: 
8/2020 · LV: Estimated LVEF is 55 - 60%. Visually measured ejection fraction. Normal cavity size and systolic function (ejection fraction normal). Mild concentric hypertrophy. Wall motion: normal. Normal left ventricular strain. · LA: Moderately dilated left atrium. · AV: Aortic valve leaflet calcification present without reduced excursion. · MV: Mitral valve non-specific thickening. Lab Results Component Value Date/Time WBC 8.6 09/26/2020 11:57 AM  
 Hemoglobin (POC) 13.3 09/09/2011 08:34 AM  
 HGB 8.5 (L) 09/26/2020 11:57 AM  
 Hematocrit (POC) 39 09/09/2011 08:34 AM  
 HCT 28.1 (L) 09/26/2020 11:57 AM  
 PLATELET 709 66/86/1700 11:57 AM  
 MCV 93.0 09/26/2020 11:57 AM  
  
Lab Results Component Value Date/Time Sodium 142 09/26/2020 11:57 AM  
 Potassium 3.2 (L) 09/26/2020 11:57 AM  
 Chloride 110 (H) 09/26/2020 11:57 AM  
 CO2 24 09/26/2020 11:57 AM  
 Anion gap 8 09/26/2020 11:57 AM  
 Glucose 116 (H) 09/26/2020 11:57 AM  
 BUN 21 (H) 09/26/2020 11:57 AM  
 Creatinine 2.15 (H) 09/26/2020 11:57 AM  
 BUN/Creatinine ratio 10 (L) 09/26/2020 11:57 AM  
 GFR est AA 28 (L) 09/26/2020 11:57 AM  
 GFR est non-AA 23 (L) 09/26/2020 11:57 AM  
 Calcium 8.5 09/26/2020 11:57 AM  
 Bilirubin, total 0.4 09/26/2020 11:57 AM  
 Alk.  phosphatase 87 09/26/2020 11:57 AM  
 Protein, total 6.9 09/26/2020 11:57 AM  
 Albumin 2.9 (L) 09/26/2020 11:57 AM  
 Globulin 4.0 09/26/2020 11:57 AM  
 A-G Ratio 0.7 (L) 09/26/2020 11:57 AM  
 ALT (SGPT) 12 09/26/2020 11:57 AM  
  
Lab Results Component Value Date/Time TSH 0.757 06/11/2018 02:06 PM  
 
  
 
 Assessment: ICD-10-CM ICD-9-CM 1. Sick sinus syndrome ([facility]  I49.5 427.81 2. PAF (paroxysmal atrial fibrillation) ([facility])  I48.0 427.31 AMB POC EKG ROUTINE W/ 12 LEADS, INTER & REP 3. Essential hypertension  I10 401.9 4. Controlled type 2 diabetes mellitus with stage 4 chronic kidney disease, with long-term current use of insulin ([facility])  E11.22 250.40 N18.4 585.4   
 Z79.4 V58.67 5. CKD (chronic kidney disease), stage IV ([facility])  N18.4 585.4 Orders Placed This Encounter  AMB POC EKG ROUTINE W/ 12 LEADS, INTER & REP Order Specific Question:   Reason for Exam: Answer:   routine  potassium chloride SR (Klor-Con 10) 10 mEq tablet Sig: Take  by mouth.  metoprolol tartrate (LOPRESSOR) 25 mg tablet Sig: Take 1 Tab by mouth two (2) times a day. Dispense:  60 Tab Refill:  3 Plan: Ms Chey Swain is s/p Successful dual lead extraction and device removal 4/27/2020 for sepsis. She is in permanent AF with OAC, tachycardic. Will add bb for rate control. Cannot tolerate increase in CCB due to edema. Refer to Dr Governor Micehlle for [facility] ablation and leadless Pacemaker. Continue medical management for BMI 32, AF and HTN. Thank you for allowing me to participate in Asaf Eid 's care. Cipriano King NP Patient seen and examined by me with nurse practitioner. I personally performed all components of the history, physical, and medical decision making and agree with the assessment and plan with minor modifications as noted. Pt with af with rvr. Not tolerating ccb. Will stop that and start bb. Will refer to Dr Governor Michelle for leadless pacemaker and av node abl. Not candidate for regular pacemaker - hx of bacteremia/sp lead extraction/device removal. Cont oac for af. Cont med rx for htn and dm Marty Paz MD, Matt Reina

## 2020-10-13 NOTE — LETTER
10/13/20 Patient: Shirin Ferguson YOB: 1960 Date of Visit: 10/13/2020 Christiano Whitehead MD 
[address] [address] VIA In Basket Dear Christiano Whitehead MD, Thank you for referring Ms. Antonella Edwards to [address] [place] [address] for evaluation. My notes for this consultation are attached. If you have questions, please do not hesitate to call me. I look forward to following your patient along with you. Sincerely, King Coyle MD

## 2020-10-13 NOTE — PROGRESS NOTES
Chief Complaint Patient presents with  Irregular Heart Beat Follow up - A Fib since PM was taken out - almost constantly  Shortness of Breath  
  always  Leg Swelling  
  latha lower legs and feet 1. Have you been to the ER, urgent care clinic since your last visit? Hospitalized since your last visit? Yes [facility] and [facility] 2. Have you seen or consulted any other health care providers outside of the [address] [place] [address] since your last visit? Include any pap smears or colon screening. Yes

## 2020-10-19 NOTE — H&P
Radiology History and Physical    Patient: Andrea Tomas 60 y.o. female       Chief Complaint: No chief complaint on file. History of Present Illness: 60year old woman with a tunneled central venous catheter, no longer needed.  Presents for removal.    History:    Past Medical History:   Diagnosis Date    Acquired lymphedema     Right arm    Arrhythmia     Asthma     Atrial fibrillation (HCC)     Dr. Rosa Liu and Dr. Lopez July Atrial flutter ([facility]     Cancer [facility])     bilateral breast    Chronic kidney disease     Diabetes mellitus type II     Diabetic ulcer of left heel associated with type 2 diabetes mellitus, with fat layer exposed ([address] 6/21/2019    Diabetic ulcer of left midfoot with necrosis of muscle ([address] 3/3/2020    Dizziness     Essential hypertension     Hyperlipidemia     Hypertension     Long term (current) use of anticoagulants     Morbid obesity ([address] 3/14/2012    Nausea & vomiting     Neuropathy     Osteoarthritis     Pacemaker     Sick sinus syndrome ([address]     Type 2 diabetes mellitus with left diabetic foot infection ([address] 10/29/2019     Family History   Problem Relation Age of Onset    Cancer Mother     Heart Disease Mother     Alcohol abuse Father     Diabetes Brother     Hypertension Brother      Social History     Socioeconomic History    Marital status:      Spouse name: Not on file    Number of children: Not on file    Years of education: Not on file    Highest education level: Not on file   Occupational History    Not on file   Social Needs    Financial resource strain: Not on file    Food insecurity     Worry: Not on file     Inability: Not on file    Transportation needs     Medical: Not on file     Non-medical: Not on file   Tobacco Use    Smoking status: Never Smoker    Smokeless tobacco: Never Used   Substance and Sexual Activity    Alcohol use: Not Currently    Drug use: No    Sexual activity: Not on file   Lifestyle    Physical activity     Days per week: Not on file     Minutes per session: Not on file    Stress: Not on file   Relationships    Social connections     Talks on phone: Not on file     Gets together: Not on file     Attends Jainism service: Not on file     Active member of club or organization: Not on file     Attends meetings of clubs or organizations: Not on file     Relationship status: Not on file    Intimate partner violence     Fear of current or ex partner: Not on file     Emotionally abused: Not on file     Physically abused: Not on file     Forced sexual activity: Not on file   Other Topics Concern    Not on file   Social History Narrative    Not on file       Allergies: Allergies   Allergen Reactions    Ace Inhibitors Cough    Amlodipine Swelling    Avapro [Irbesartan] Unable to Obtain    Bactrim [Sulfamethoxazole-Trimethoprim] Other (comments)     Sore mouth    Captopril Cough    Carvedilol Diarrhea    Contrast Agent [Iodine] Itching    Fish Containing Products Hives    Morphine Nausea and Vomiting and Swelling    Penicillins Hives     Did not tolerate cefepime 3/2020    Pravachol [Pravastatin] Nausea Only    Seafood [Shellfish Containing Products] Hives    Singulair [Montelukast] Other (comments)     palpitations       Current Medications:  No current facility-administered medications for this encounter. Physical Exam:  There were no vitals taken for this visit. GENERAL: alert, cooperative, no distress, appears stated age,   LUNG: Nonlabored respiration on room air  HEART: regular rate and rhythm, R radial & R DP pulse 2/2  Right chest tunneled CVC without erythema or tenderness    Alerts:    Hospital Problems  Date Reviewed: 10/13/2020    None          Laboratory:    No results for input(s): HGB, HCT, WBC, PLT, INR, BUN, CREA, K, CRCLT, HGBEXT, HCTEXT, PLTEXT, INREXT in the last 72 hours.     No lab exists for component: PTT, PT      Plan of Care/Planned Procedure:  Risks, benefits, and alternatives reviewed with patient and she agrees to proceed with the procedure.      Tunneled central venous catheter removal.    Philip Denney MD  Interventional Radiology  [facility] Radiology, [facility].  12:52 PM, 10/19/2020

## 2020-10-19 NOTE — PROCEDURES
Interventional Radiology  Procedure Note        10/19/2020 12:56 PM    Patient: Aracely Frost     Informed consent obtained    Diagnosis: MSSA bacteremia, treated    Procedure(s): removal of indwelling tunneled central venous catheter    Specimens removed:   Indwelling catheter removed    Complications: None    Primary Physician: Jaquelin Freed MD    Recomendations: N/A    Discharge Disposition: stable; discharge to home    Full dictated report to follow    Jaquelin Freed MD  Interventional Radiology  [address] [facility] Radiology, [facility].  12:56 PM, 10/19/2020

## 2020-10-26 NOTE — WOUND CARE
02/11/20 1502 Wound Foot Right;Plantar;Lateral #2 07/30/19 Date First Assessed/Time First Assessed: 07/30/19 1530   Wound Approximate Age at First Assessment (Weeks): 24 weeks  Primary Wound Type: Diabetic Ulcer  Location: Foot  Wound Location Orientation: Right;Plantar;Lateral  Wound Description: #2  Date of. .. Dressing Status Removed Dressing Type Gauze;Gauze wrap (arnie) (HFBR) Wound Length (cm) 1 cm Wound Width (cm) 1 cm Wound Depth (cm) 0.2 cm Wound Surface Area (cm^2) 1 cm^2 Wound Volume (cm^3) 0.2 cm^3 Condition of Base Pink Condition of Edges Calloused Drainage Amount Moderate Drainage Color Serous Wound Odor None Sandra-wound Assessment Maceration Cleansing and Cleansing Agents  Normal saline; Soap and water Wound Heel Left #1 12/17/19 Date First Assessed/Time First Assessed: 12/17/19 1529   Wound Approximate Age at First Assessment (Weeks): 5 weeks  Primary Wound Type: Diabetic Ulcer  Location: Heel  Wound Location Orientation: Left  Wound Description: #1  Date of First Observation. .. Dressing Status Removed Dressing Type Bacteriostatic dressing; Foam 
(TCC) Wound Length (cm) 2.2 cm Wound Width (cm) 1.8 cm Wound Depth (cm) 0.3 cm Wound Surface Area (cm^2) 3.96 cm^2 Wound Volume (cm^3) 1.19 cm^3 Condition of Base Pink Condition of Edges Open Undermining (cm) 0.3 cm Direction of Undermining 7 o'clock;9 o'clock Drainage Amount Moderate Drainage Color Serosanguinous Wound Odor None Sandra-wound Assessment Maceration Cleansing and Cleansing Agents  Normal saline; Soap and water Sent approximately 45 minutes later - Sorry, I have a horrible habit of not rereading things before I send them. I meant to say since I still have both of the medicines. I currently have a drawer of old medicines from times we switched before the fill date, and I want to avoid adding to it.

## 2020-10-27 NOTE — PATIENT INSTRUCTIONS
Atrial Fibrillation: Care Instructions Your Care Instructions Atrial fibrillation is an irregular and often fast heartbeat. Treating this condition is important for several reasons. It can cause blood clots, which can travel from your heart to your brain and cause a stroke. If you have a fast heartbeat, you may feel lightheaded, dizzy, and weak. An irregular heartbeat can also increase your risk for heart failure. Atrial fibrillation is often the result of another heart condition, such as high blood pressure or coronary artery disease. Making changes to improve your heart condition will help you stay healthy and active. Follow-up care is a key part of your treatment and safety. Be sure to make and go to all appointments, and call your doctor if you are having problems. It's also a good idea to know your test results and keep a list of the medicines you take. How can you care for yourself at home? Medicines 
  · Take your medicines exactly as prescribed. Call your doctor if you think you are having a problem with your medicine. You will get more details on the specific medicines your doctor prescribes.  
  · If your doctor has given you a blood thinner to prevent a stroke, be sure you get instructions about how to take your medicine safely. Blood thinners can cause serious bleeding problems.  
  · Do not take any vitamins, over-the-counter drugs, or herbal products without talking to your doctor first.  
Lifestyle changes 
  · Do not smoke. Smoking can increase your chance of a stroke and heart attack. If you need help quitting, talk to your doctor about stop-smoking programs and medicines. These can increase your chances of quitting for good.  
  · Eat a heart-healthy diet.  
  · Stay at a healthy weight. Lose weight if you need to.  
  · Limit alcohol to 2 drinks a day for men and 1 drink a day for women. Too much alcohol can cause health problems.   · Avoid colds and flu. Get a pneumococcal vaccine shot. If you have had one before, ask your doctor whether you need another dose. Get a flu shot every year. If you must be around people with colds or flu, wash your hands often. Activity 
  · If your doctor recommends it, get more exercise. Walking is a good choice. Bit by bit, increase the amount you walk every day. Try for at least 30 minutes on most days of the week. You also may want to swim, bike, or do other activities. Your doctor may suggest that you join a cardiac rehabilitation program so that you can have help increasing your physical activity safely.  
  · Start light exercise if your doctor says it is okay. Even a small amount will help you get stronger, have more energy, and manage stress. Walking is an easy way to get exercise. Start out by walking a little more than you did in the hospital. Gradually increase the amount you walk.  
  · When you exercise, watch for signs that your heart is working too hard. You are pushing too hard if you cannot talk while you are exercising. If you become short of breath or dizzy or have chest pain, sit down and rest immediately.  
  · Check your pulse regularly. Place two fingers on the artery at the palm side of your wrist, in line with your thumb. If your heartbeat seems uneven or fast, talk to your doctor. When should you call for help? Call 911 anytime you think you may need emergency care. For example, call if: 
  · You have symptoms of a heart attack. These may include: 
? Chest pain or pressure, or a strange feeling in the chest. 
? Sweating. ? Shortness of breath. ? Nausea or vomiting. ? Pain, pressure, or a strange feeling in the back, neck, jaw, or upper belly or in one or both shoulders or arms. ? Lightheadedness or sudden weakness. ? A fast or irregular heartbeat.  
After you call 911, the  may tell you to chew 1 adult-strength or 2 to 4 low-dose aspirin. Wait for an ambulance. Do not try to drive yourself.  
  · You have symptoms of a stroke. These may include: 
? Sudden numbness, tingling, weakness, or loss of movement in your face, arm, or leg, especially on only one side of your body. ? Sudden vision changes. ? Sudden trouble speaking. ? Sudden confusion or trouble understanding simple statements. ? Sudden problems with walking or balance. ? A sudden, severe headache that is different from past headaches.  
  · You passed out (lost consciousness). Call your doctor now or seek immediate medical care if: 
  · You have new or increased shortness of breath.  
  · You feel dizzy or lightheaded, or you feel like you may faint.  
  · Your heart rate becomes irregular.  
  · You can feel your heart flutter in your chest or skip heartbeats. Tell your doctor if these symptoms are new or worse. Watch closely for changes in your health, and be sure to contact your doctor if you have any problems. Where can you learn more? Go to http://www.gray.com/ Enter U020 in the search box to learn more about \"Atrial Fibrillation: Care Instructions. \" Current as of: December 16, 2019               Content Version: 12.6 © 9674-4873 [employer]. Care instructions adapted under license by [employer] (which disclaims liability or warranty for this information). If you have questions about a medical condition or this instruction, always ask your healthcare professional. [place] [address] any warranty or liability for your use of this information.

## 2020-10-27 NOTE — PROGRESS NOTES
Room: 3B Identified pt with two pt identifiers(name and ). Reviewed record in preparation for visit and have obtained necessary documentation. All patient medications has been reviewed. Chief Complaint Patient presents with  Diabetes 3 month follow-up  Hypertension  Cholesterol Problem  Irregular Heart Beat Health Maintenance Due Topic  Shingrix Vaccine Age 50> (1 of 2)  PAP AKA CERVICAL CYTOLOGY  Colorectal Cancer Screening Combo  Flu Vaccine (1)  MICROALBUMIN Q1 Vitals:  
 10/27/20 1325 BP: 138/82 Pulse: 95 Resp: 18 Temp: 97.7 °F (36.5 °C) TempSrc: Oral  
SpO2: 98% Weight: 251 lb (113.9 kg) Height: 5' 10\" (1.778 m) PainSc:   0 - No pain 4. Have you been to the ER, urgent care clinic since your last visit? Hospitalized since your last visit? Yes; [facility] last month for a-fib 5. Have you seen or consulted any other health care providers outside of the [address] [place] [address] since your last visit? Include any pap smears or colon screening. No 
 
6. Would you like to receive your flu shot today? NO - pt states she received during her last hospital stay for shoulder 8. Do you have an Advanced Directive/ Living Will in place? YES If yes, do we have a copy on file YES Patient is accompanied by self I have received verbal consent from Марина Cortez to discuss any/all medical information while they are present in the room.

## 2020-10-27 NOTE — PROGRESS NOTES
CC:  
Chief Complaint Patient presents with  Diabetes 3 month follow-up  Hypertension  Cholesterol Problem  Irregular Heart Beat HISTORY OF PRESENT ILLNESS Jessica Nelson is a 60 y.o. female. Presents to [facility] care. Her former PCP was Dr. Temi Kathleen, now retired, last saw by virtual visit on 6/25/20. She has type 2 DM with CKD stage 4, polyneuropathy, and left eye proliferative diabetic retinopathy, HTN, hyperlipidemia, paroxsymal atrial fibrillation on chronic warfarin, hx of ablation of a-fib, cardiac pacemaker for SSS, hx of breast cancer s/p bilateral mastectomy, left BKA 5/2020, and morbid obesity. 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 Later admitted to [facility] from 8/20-9/11/20 for septic arthritis of the right sternal clavicular joint. Treated with IV vancomycin for 6 weeks (ended on 10/5/20) and had PICC line removed on 10/19/20. Today she complains of SOB and heart palpitations. Has an appointment with Dr. Skinny Decker (Cardiology) tomorrow to discuss new pacemaker placement. Was having PT but stopped due to SOB. She is seen for diabetes. She denies polydipsia, polyuria, or hypoglycemia. Home glucose monitoring: is performed regularly. BS before dinner: 101-125. Takes Lantus insulin 10 units in am.  She reports medication compliance: compliant all of the time. Medication side effects: none. Diabetic diet compliance: compliant most of the time. Lab review: last A1c 6.1% on 8/21/20. Eye exam: UTD. Cardiovascular Review The patient has hypertension, hyperlipidemia and Atrial Fibrillation. Denies HA's, CP, dizziness, or right leg swelling. Home BP monitoring: not done.   She reports taking medications as instructed, no medication side effects noted. Diet and Lifestyle: generally follows a low fat low cholesterol diet, generally follows a low sodium diet, sedentary, no formal exercise but active during the day. Lab review: orders written for new lab studies as appropriate; see orders. Soc Hx 
. No children. Retired [employer] worker. Never smoker. Drinks alcohol rarely. Denies recreational drug use. Health Maintenance Flu vaccine: had earlier this month at pharmacy Pneumonia vaccine: PPSV-23 10/10/09 Tetanus vaccine: Tdap 6/13/16 Zoster vaccine: due for this, to get at pharmacy Pap smear: declined Colonoscopy: 4/21/10, Dr. John Brown, normal, repeat in 10 yrs, due for this Eye exam: 12/6/19, Dr. Katy Bains, III Foot exam: 3/28/20 Lipids: 11/11/19 (tot chol 141, LDL 82) A1c: 8/21/120 (6.1%) Urine microalbumin:  
Advanced Directives: on file End of Life: on file ROS A complete review of systems was performed and is negative except for those mentioned in the HPI. Patient Active Problem List  
Diagnosis Code  History of breast cancer Z85.3  Asthma J45.909  Fe deficiency anemia D50.9  Status post ablation of atrial fibrillation Z98.890, Z86.79  
 Sick sinus syndrome ([facility]) I49.5  S/P cardiac pacemaker procedure Z95.0  Long term (current) use of anticoagulants Z79.01  
 Mixed hyperlipidemia E78.2  CKD (chronic kidney disease), stage IV ([facility]) N18.4  Systolic murmur M81.1  Essential hypertension I10  
 PAF (paroxysmal atrial fibrillation) ([facility]) I48.0  Anemia in stage 4 chronic kidney disease ([facility]) N18.4, D63.1  Controlled type 2 diabetes mellitus with stage 4 chronic kidney disease, with long-term current use of insulin ([facility]) E11.22, N18.4, Z79.4  Morbid obesity with BMI of 40.0-44.9, adult ([facility]) E66.01, Z68.41  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, associated with type 2 diabetes mellitus [facility] C00.1645  Diabetic polyneuropathy associated with type 2 diabetes mellitus (HCC) E11.42  Venous stasis ulcer of right lower leg with edema of right lower leg ([facility]) I83.019, I83.891, L97.919, R60.9  Diabetic ulcer of right midfoot associated with type 2 diabetes mellitus, with fat layer exposed ([facility] E11.621, L11.170  Foot deformity, right M21.961  Pes cavus Q66.70  
 H/O bilateral mastectomy Z90.13  Left below-knee amputee [facility]) D35.664 Past Medical History:  
Diagnosis Date  Acquired lymphedema Right arm  Arrhythmia  Asthma  Atrial fibrillation ([facility]  Cancer ([facility] bilateral breast  
 Chronic kidney disease  Diabetes mellitus type II   
 Diabetic ulcer of left heel associated with type 2 diabetes mellitus, with fat layer exposed ([facility] 6/21/2019  Diabetic ulcer of left midfoot with necrosis of muscle ([facility] 3/3/2020  Dizziness  Essential hypertension  Hyperlipidemia  Hypertension  Long term (current) use of anticoagulants  Morbid obesity ([facility] 3/14/2012  Nausea & vomiting  Neuropathy  Osteoarthritis  Pacemaker  Sick sinus syndrome ([facility]  Type 2 diabetes mellitus with left diabetic foot infection ([facility] 10/29/2019 Allergies Allergen Reactions  Ace Inhibitors Cough  Amlodipine Swelling  Avapro [Irbesartan] Unable to Obtain  Bactrim [Sulfamethoxazole-Trimethoprim] Other (comments) Sore mouth  Captopril Cough  Carvedilol Diarrhea  Contrast Agent [Iodine] Itching [address]  Morphine Nausea and Vomiting and Swelling  Penicillins Hives Did not tolerate cefepime 3/2020  Pravachol [Pravastatin] Nausea Only  Seafood [Shellfish Containing Products] Hives  Singulair [Montelukast] Other (comments)  
  palpitations Current Outpatient Medications Medication Sig Dispense Refill  potassium chloride SR (Klor-Con 10) 10 mEq tablet Take  by mouth.  metoprolol tartrate (LOPRESSOR) 25 mg tablet Take 1 Tab by mouth two (2) times a day. 60 Tab 3  
 sertraline (ZOLOFT) 25 mg tablet Take 25 mg by mouth daily. Take with 50 mg to total 75 mg  bumetanide (BUMEX) 1 mg tablet Take 0.5 mg by mouth two (2) times a day.  risperidone (RISPERDAL PO) Take  by mouth.  insulin glargine (Lantus Solostar U-100 Insulin) 100 unit/mL (3 mL) inpn 10 Units by SubCUTAneous route Daily (before breakfast).  sertraline (Zoloft) 50 mg tablet Take 75 mg by mouth daily.  warfarin (COUMADIN) 2 mg tablet Take 1 tablet po daily (Patient taking differently: Take 2 mg by mouth daily. 2 mg M-W-FR and 1 all other days) 50 Tab 5  hydrALAZINE (APRESOLINE) 100 mg tablet Take 1 Tab by mouth three (3) times daily. 90 Tab 11  
 busPIRone (BUSPAR) 10 mg tablet TAKE ONE TABLET BY MOUTH TWICE A DAY 60 Tab 10  
 dilTIAZem ER (CARDIZEM LA) 240 mg tablet Take 1 Tab by mouth daily. 30 Tab 0 PHYSICAL EXAM 
Visit Vitals /82 (BP 1 Location: Left arm, BP Patient Position: Sitting) Pulse 95 Temp 97.7 °F (36.5 °C) (Oral) Resp 18 Ht 5' 10\" (1.778 m) Wt 251 lb (113.9 kg) SpO2 98% BMI 36.01 kg/m² General: Well-developed and well-nourished, no distress. Ambulates with a walker. HEENT:  Head normocephalic/atraumatic, no scleral icterus Lungs:  Clear to ausculation bilaterally. Good air movement. Heart:  Regular rate and rhythm, normal S1 and S2, no murmur, gallop, or rub Extremities: No clubbing, cyanosis, or RLE edema. Sp L BKA, prosthetic in place. Neurological: Alert and oriented. Psychiatric: Normal mood and affect. Behavior is normal.  
 
 
 
ASSESSMENT AND PLAN 
  ICD-10-CM ICD-9-CM 1. Encounter to establish care  Z76.89 V65.8 2.  Controlled type 2 diabetes mellitus with stage 4 chronic kidney disease, with long-term current use of insulin (HCC)  E11.22 250.40 HEMOGLOBIN A1C WITH EAG  
 N18.4 585.4 MICROALBUMIN, UR, RAND W/ MICROALB/CREAT RATIO  
 Z79.4 V58.67   
3. Essential hypertension  Z65 374.5 METABOLIC PANEL, COMPREHENSIVE  
   CBC WITH AUTOMATED DIFF 4. Mixed hyperlipidemia  E78.2 272.2 LIPID PANEL 5. PAF (paroxysmal atrial fibrillation) ([facility])  I48.0 427.31   
6. Left below-knee amputee ([facility]  Z89.512 V49.75   
7. S/P cardiac pacemaker procedure  Z95.0 V45.01   
8. 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, associated with type 2 diabetes mellitus ([facility]  Q49.3501 250.50   
  362.02   
  361.81   
9. Screening for colon cancer  Z12.11 V76.51 REFERRAL TO GASTROENTEROLOGY Diagnoses and all orders for this visit: 1. Encounter to establish care 2. Controlled type 2 diabetes mellitus with stage 4 chronic kidney disease, with long-term current use of insulin ([facility] Last A1c 6.1% in 8/20. Continue Lantus insulin 10 units daily. Checks FBS also. Recheck A1c in 2 months. 
-     HEMOGLOBIN A1C WITH EAG; Future -     MICROALBUMIN, UR, RAND W/ MICROALB/CREAT RATIO; Future 3. Essential hypertension Controlled. Has ACE-I/ARB-intolerance. Diltiazem caused too much leg edema. Continue hydralazine 100 mg TID and metoprolol. Also on Bumex 0.5 mg BID for leg swelling. 
-     METABOLIC PANEL, COMPREHENSIVE; Future -     CBC WITH AUTOMATED DIFF; Future 4. Mixed hyperlipidemia Not on a statin. History of nausea with pravastatin. Consider adding low-dose statin (Crestor 5 mg) at next clinic visit. -     LIPID PANEL; Future 5. PAF (paroxysmal atrial fibrillation) ([facility] Controlled on metoprolol and warfarin. 6. Left below-knee amputee ([facility] Physical therapy on hold until cardiac status optimized. 7. S/P cardiac pacemaker procedure S/p dual lead extraction and device removal by Dr. Ashley Recinos on 4/27/20. Needs new pacemaker. To see Dr. Bernardino Dickson tomorrow about this. 8. 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, associated with type 2 diabetes mellitus ([facility] Follow up with eye doctor as scheduled. 9. Screening for colon cancer 
-     REFERRAL TO GASTROENTEROLOGY (Dr. Nadir Tee for screening colonoscopy) Greater than 40 mins direct face-to-face time spent with patient. Greater than 50% of time spent on counseling and coordination of care. Follow-up and Dispositions · Return in about 2 months (around 12/27/2020), or if symptoms worsen or fail to improve, for DM, HTN; have fasting labs done 1 week prior to appointment. I have discussed the diagnosis with the patient and the intended plan as seen in the above orders. Patient is in agreement. The patient has received an after-visit summary and questions were answered concerning future plans. I have discussed medication side effects and warnings with the patient as well.

## 2020-10-28 NOTE — PROGRESS NOTES
HISTORY OF PRESENTING ILLNESS Jessica Nelson is a 60 y.o. female with hx of hypertension, AF s/p ablation and dual lead extraction and device removal of a left sided pacemaker s/p sepsis/foot infection on 4/27/2020. She has permanent AF with tachycardia, currently maintained on BB therapy for rate control. She's been intolerant of CCB d/t edema. She has experienced significant fatigue/SOB since her extraction. She is eager to proceed with reimplantation of a pacemaker. PAST MEDICAL HISTORY Past Medical History:  
Diagnosis Date  Acquired lymphedema Right arm  Arrhythmia  Asthma  Atrial fibrillation ([address] Dr. Reyes Juan and Dr. Alessandra alves [facility])  Cancer ([address] bilateral breast  
 Chronic kidney disease  Diabetes mellitus type II   
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 ([address] 3/3/2020  Dizziness  Essential hypertension  Hyperlipidemia  Hypertension  Long term (current) use of anticoagulants  Morbid obesity ([address] 3/14/2012  Nausea & vomiting  Neuropathy  Osteoarthritis  Pacemaker  Sick sinus syndrome ([address]  Type 2 diabetes mellitus with left diabetic foot infection ([address] 10/29/2019 PAST SURGICAL HISTORY Past Surgical History:  
Procedure Laterality Date  ABDOMEN SURGERY PROC UNLISTED    
 gastric bypass  GASTRIC BYPASS,OBESE<150CM SAW-EN-Y  7/08  
 [facility]  HX AFIB ABLATION    
 HX AMPUTATION FOOT Left 04/2020  HX BREAST RECONSTRUCTION Bilateral   
 HX CARPAL TUNNEL RELEASE [address] RIGHT MODIFIED RADICAL   
 HX MASTECTOMY Left   
 no lymphnode removal  
 HX MOHS PROCEDURES  1995  
 right  HX ORTHOPAEDIC Right   
 knee surgery  HX PACEMAKER    
  HX PACEMAKER    
 IR INSERT TUNL CVC W PORT OVER 5 YEARS    
 IR INSERT TUNL CVC W/O PORT OVER 5 YR  5/4/2020  IR INSERT TUNL CVC W/O PORT OVER 5 YR  9/8/2020  IR REMOVE TUNL CVAD W/O PORT / PUMP  6/26/2020  IR REMOVE TUNL CVAD W/O PORT / PUMP  10/19/2020  [address]  8.21.07  
 NC [address] AND 1994  NC RELOCATION OF SKIN POCKET FOR PACEMAKER N/A 4/24/2020 RELOCATE [address] performed by Socorro Ryan MD at [address] CATH LAB  NC RMVL TRANSVNS PM ELTRD 1 LEAD SYS ATR/VENTR N/A 4/24/2020 Remove Pacemaker Dual Leads performed by Socorro Ryan MD at [facility] CATH LAB  NC RMVL TRANSVNS PM ELTRD 1 LEAD SYS ATR/VENTR N/A 4/27/2020 REMOVE PACEMAKER DUAL LEADS performed by Socorro Ryan MD at [address] CATH LAB  NC TRABECULOPLASTY BY LASER SURGERY    
 US GUIDE ASP OVARIAN CYST ABD APPROACH  8.21.07  
 RIGHT ALLERGIES Allergies Allergen Reactions  Ace Inhibitors Cough  Amlodipine Swelling  Avapro [Irbesartan] Unable to Obtain  Bactrim [Sulfamethoxazole-Trimethoprim] Other (comments) Sore mouth  Captopril Cough  Carvedilol Diarrhea  Contrast Agent [Iodine] Itching [address]  Morphine Nausea and Vomiting and Swelling  Penicillins Hives Did not tolerate cefepime 3/2020  Pravachol [Pravastatin] Nausea Only  Seafood [Shellfish Containing Products] Hives  Singulair [Montelukast] Other (comments)  
  palpitations FAMILY HISTORY Family History Problem Relation Age of Onset  Cancer Mother  Heart Disease Mother  Alcohol abuse Father  Diabetes Brother  Hypertension Brother   
 negative for cardiac disease SOCIAL HISTORY Social History Socioeconomic History  Marital status:  Spouse name: Not on file  Number of children: Not on file  Years of education: Not on file  Highest education level: Not on file Tobacco Use  Smoking status: Never Smoker  Smokeless tobacco: Never Used Substance and Sexual Activity  Alcohol use: Not Currently  Drug use: No  
 
 
 
MEDICATIONS Current Outpatient Medications Medication Sig  potassium chloride SR (Klor-Con 10) 10 mEq tablet Take  by mouth.  metoprolol tartrate (LOPRESSOR) 25 mg tablet Take 1 Tab by mouth two (2) times a day.  sertraline (ZOLOFT) 25 mg tablet Take 25 mg by mouth daily. Take with 50 mg to total 75 mg  bumetanide (BUMEX) 1 mg tablet Take 0.5 mg by mouth two (2) times a day.  insulin glargine (Lantus Solostar U-100 Insulin) 100 unit/mL (3 mL) inpn 10 Units by SubCUTAneous route Daily (before breakfast).  sertraline (Zoloft) 50 mg tablet Take 75 mg by mouth daily.  warfarin (COUMADIN) 2 mg tablet Take 1 tablet po daily (Patient taking differently: Take 2 mg by mouth daily. 2 mg M-W-FR and 1 all other days usually managed by PCP)  hydrALAZINE (APRESOLINE) 100 mg tablet Take 1 Tab by mouth three (3) times daily.  busPIRone (BUSPAR) 10 mg tablet TAKE ONE TABLET BY MOUTH TWICE A DAY  risperidone (RISPERDAL PO) Take  by mouth. No current facility-administered medications for this visit. I have reviewed the nurses notes, vitals, problem list, allergy list, medical history, family, social history and medications. REVIEW OF SYMPTOMS General: Pt denies excessive weight gain or loss. Pt is able to conduct ADL's HEENT: Denies blurred vision, headaches, hearing loss, epistaxis and difficulty swallowing. Respiratory: Denies cough, congestion, shortness of breath, CHOW, wheezing or stridor. Cardiovascular: Denies precordial pain, palpitations, edema or PND Gastrointestinal: Denies poor appetite, indigestion, abdominal pain or blood in stool Genitourinary: Denies hematuria, dysuria, increased urinary frequency Musculoskeletal: Denies joint pain or swelling from muscles or joints Neurologic: Denies tremor, paresthesias, headache, or sensory motor disturbance Psychiatric: Denies confusion, insomnia, depression Integumentray: Denies rash, itching or ulcers. Hematologic: Denies easy bruising, bleeding PHYSICAL EXAMINATION Vitals: see vitals section General: Well developed, in no acute distress. HEENT: No jaundice, oral mucosa moist, no oral ulcers Neck: Supple, no stiffness, no lymphadenopathy, supple Heart:  irreg irreg, no murmur, gallop or rub, no jugular venous distention Respiratory: Clear bilaterally x 4, no wheezing or rales Abdomen:   Soft, non-tender, bowel sounds are active. Extremities:  No edema, normal cap refill, no cyanosis. Musculoskeletal: No clubbing, no deformities Neuro: A&Ox3, speech clear, gait stable, cooperative, no focal neurologic deficits Skin: Skin color is normal. No rashes or lesions. Non diaphoretic, moist. 
Vascular: 2+ pulses symmetric in all extremities DIAGNOSTIC DATA EKG:  
 
 
 LABORATORY DATA Lab Results Component Value Date/Time WBC 8.6 09/26/2020 11:57 AM  
 Hemoglobin (POC) 13.3 09/09/2011 08:34 AM  
 HGB 8.5 (L) 09/26/2020 11:57 AM  
 Hematocrit (POC) 39 09/09/2011 08:34 AM  
 HCT 28.1 (L) 09/26/2020 11:57 AM  
 PLATELET 809 48/44/2706 11:57 AM  
 MCV 93.0 09/26/2020 11:57 AM  
  
Lab Results Component Value Date/Time  Sodium 142 09/26/2020 11:57 AM  
 Potassium 3.2 (L) 09/26/2020 11:57 AM  
 Chloride 110 (H) 09/26/2020 11:57 AM  
 CO2 24 09/26/2020 11:57 AM  
 Anion gap 8 09/26/2020 11:57 AM  
 Glucose 116 (H) 09/26/2020 11:57 AM  
 BUN 21 (H) 09/26/2020 11:57 AM  
 Creatinine 2.15 (H) 09/26/2020 11:57 AM  
 BUN/Creatinine ratio 10 (L) 09/26/2020 11:57 AM  
 GFR est AA 28 (L) 09/26/2020 11:57 AM  
 GFR est non-AA 23 (L) 09/26/2020 11:57 AM  
 Calcium 8.5 09/26/2020 11:57 AM  
 Bilirubin, total 0.4 09/26/2020 11:57 AM  
 Alk. phosphatase 87 09/26/2020 11:57 AM  
 Protein, total 6.9 09/26/2020 11:57 AM  
 Albumin 2.9 (L) 09/26/2020 11:57 AM  
 Globulin 4.0 09/26/2020 11:57 AM  
 A-G Ratio 0.7 (L) 09/26/2020 11:57 AM  
 ALT (SGPT) 12 09/26/2020 11:57 AM  
  
 
 
 ASSESSMENT 1. Atrial fibrillation Permanent BB therapy 2. S/p PPM extraction and device removal d/t bacteremia 3. Hypertension PLAN Leadless PPM and AV rolando ablation concomitantly with general anesthesia at [facility]. Utilize right groin for leadless PPM and left groin for AV node ablation. ICD-10-CM ICD-9-CM 1. Sick sinus syndrome ([facility]  I49.5 427.81 2. PAF (paroxysmal atrial fibrillation) ([facility])  I48.0 427.31   
3. Essential hypertension  I10 401.9 4. Controlled type 2 diabetes mellitus with stage 4 chronic kidney disease, with long-term current use of insulin ([facility])  E11.22 250.40 N18.4 585.4   
 Z79.4 V58.67 5. CKD (chronic kidney disease), stage IV ([facility])  N18.4 585.4 6. Mixed hyperlipidemia  E78.2 272.2 7. Anemia in stage 4 chronic kidney disease ([facility])  N18.4 285.21   
 D63.1 585.4 8. Morbid obesity with BMI of 40.0-44.9, adult ([facility])  E66.01 278.01   
 Z68.41 V85.41 No orders of the defined types were placed in this encounter. FOLLOW-UP Post procedure Thank you, Tyson Cid MD and Dr. Charleen Tao for allowing me to participate in the care of this extraordinarily pleasant female. Please do not hesitate to contact me for further questions/concerns. Gisele Jones MD 
Cardiac Electrophysiology / Cardiology [address], [address] [facility], [address] [place] Enrique Escudero[address] 
[phone] / [phone] Fax   [phone] / [phone] Fax

## 2020-10-28 NOTE — Clinical Note
Please setup for leadless ppm with medtronic, av node ablation with general anesthesia at [facility]. Hold eliquis 2 days proir.

## 2020-10-28 NOTE — PROGRESS NOTES
ROOM # 5 [facility]-9/26/20- AFib 
SOB,lightheadedness, palpitations PCP managing Warfarin Visit Vitals BP (!) 140/82 (BP 1 Location: Left arm, BP Patient Position: Sitting) Pulse 100 Comment: irregular Resp 24 Ht 5' 10\" (1.778 m) Wt 256 lb (116.1 kg) SpO2 97% BMI 36.73 kg/m²

## 2020-11-02 NOTE — TELEPHONE ENCOUNTER
Called patient, ID verified using two patient identifiers. Scheduled patient for Medtronic leadless ppm and AVN ablation with Dr. Mery Davidson @ [facility] on Tuesday 11/17/20 with an arrival time of 10 am. Pre procedure instructions to be mailed to patient's home address.

## 2020-11-05 NOTE — PROGRESS NOTES
Kev Curran  Identified pt with two pt identifiers(name and ). Chief Complaint Patient presents with  Mass Room 2B // Hard, painful lump on stomach // x a couple of days // right Reviewed record In preparation for visit and have obtained necessary documentation. 1. Have you been to the ER, urgent care clinic or hospitalized since your last visit? No  
 
2. Have you seen or consulted any other health care providers outside of the [address] [place] [address] since your last visit? Include any pap smears or colon screening. No 
 
Patient has an advance directive. Vitals reviewed with provider. Health Maintenance reviewed:  
 
Health Maintenance Due Topic  Shingrix Vaccine Age 50> (1 of 2)  Colorectal Cancer Screening Combo  Flu Vaccine (1)  Lipid Screen  MICROALBUMIN Q1 Wt Readings from Last 3 Encounters:  
20 268 lb (121.6 kg) 10/28/20 256 lb (116.1 kg) 10/27/20 251 lb (113.9 kg) Temp Readings from Last 3 Encounters:  
20 98.1 °F (36.7 °C) (Oral) 10/27/20 97.7 °F (36.5 °C) (Oral) 10/07/20 97.9 °F (36.6 °C) (Oral) BP Readings from Last 3 Encounters:  
20 (!) 146/93  
10/28/20 (!) 140/82  
10/27/20 138/82 Pulse Readings from Last 3 Encounters:  
20 (!) 114  
10/28/20 100  
10/27/20 95 Vitals:  
 20 1034 BP: (!) 146/93 Pulse: (!) 114 Resp: 14 Temp: 98.1 °F (36.7 °C) TempSrc: Oral  
SpO2: 99% Weight: 268 lb (121.6 kg) Height: 5' 10\" (1.778 m) PainSc:  10 - Worst pain ever PainLoc: Abdomen Learning Assessment:  
:  
 
 
Learning Assessment 3/25/2014 PRIMARY LEARNER Patient HIGHEST LEVEL OF EDUCATION - PRIMARY LEARNER  2 YEARS OF COLLEGE  
BARRIERS PRIMARY LEARNER NONE  
CO-LEARNER CAREGIVER No  
PRIMARY LANGUAGE ENGLISH  NEED No  
LEARNER PREFERENCE PRIMARY DEMONSTRATION  
LEARNING SPECIAL TOPICS Does does a pacemaker any noise ANSWERED BY patient RELATIONSHIP SELF Depression Screening:  
:  
 
 
3 most recent PHQ Screens 10/27/2020 Little interest or pleasure in doing things Not at all Feeling down, depressed, irritable, or hopeless Not at all Total Score PHQ 2 0 Fall Risk Assessment:  
:  
 
 
Fall Risk Assessment, last 12 mths 8/15/2019 Able to walk? Yes Fall in past 12 months? No  
  
 
Abuse Screening:  
:  
 
 
Abuse Screening Questionnaire 8/15/2019 Do you ever feel afraid of your partner? Katrin Rhett Are you in a relationship with someone who physically or mentally threatens you? Katrin Rhett Is it safe for you to go home? Y  
  
 
ADL Screening:  
:  
 
 
ADL Assessment 6/12/2020 Feeding yourself No Help Needed Getting from bed to chair Help Needed Getting dressed No Help Needed Bathing or showering No Help Needed Walk across the room (includes cane/walker) No Help Needed Using the telphone No Help Needed Taking your medications No Help Needed Preparing meals No Help Needed Managing money (expenses/bills) No Help Needed Moderately strenuous housework (laundry) No Help Needed Shopping for personal items (toiletries/medicines) No Help Needed Shopping for groceries No Help Needed Driving No Help Needed Climbing a flight of stairs Help Needed Getting to places beyond walking distances No Help Needed

## 2020-11-05 NOTE — ED PROVIDER NOTES
EMERGENCY DEPARTMENT HISTORY AND PHYSICAL EXAM 
 
 
Date: 11/5/2020 Patient Name: Emeli Looney History of Presenting Illness Chief Complaint Patient presents with  Abdominal Pain  
  x 2 days. sent over by NP for further work up and potential CT. pt has been nauseous but no vomiting  Urinary Pain History Provided By: Patient HPI: Emeli Looney, 60 y.o. female  presents to the ED with cc of right lower quadrant abdominal pain and a \"lump\". Symptoms have been present for 2 days. She saw her family practitioner group and the nurse practitioner referred her to the ER. She states she has been nauseous but no vomiting. She does tolerate some oral intake but has lack of appetite. No change in her bowel movements. No fevers or chills. No chest pain or shortness of breath. She states she has no difficulty with urination. Past History Past Medical History: 
Past Medical History:  
Diagnosis Date  Acquired lymphedema Right arm  Arrhythmia  Asthma  Atrial fibrillation ([address] Dr. David Sampson and Dr. Kae alves [facility])  Cancer ([address] bilateral breast  
 Chronic kidney disease  Diabetes mellitus type II   
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 
Past Surgical History:  
Procedure Laterality Date  ABDOMEN SURGERY PROC UNLISTED    
 gastric bypass  GASTRIC BYPASS,OBESE<150CM SAW-EN-Y  7/08  
 [place]  HX AFIB ABLATION    
  HX AMPUTATION FOOT Left 04/2020  HX BREAST RECONSTRUCTION Bilateral   
 HX CARPAL TUNNEL RELEASE [address] [facility] [address] RIGHT MODIFIED RADICAL   
 HX MASTECTOMY Left   
 no lymphnode removal  
 HX MOHS PROCEDURES  1995  
 right  HX ORTHOPAEDIC Right   
 knee surgery  HX PACEMAKER    
 HX PACEMAKER    
 IR INSERT TUNL CVC W PORT OVER 5 YEARS    
 IR INSERT TUNL CVC W/O PORT OVER 5 YR  5/4/2020  IR INSERT TUNL CVC W/O PORT OVER 5 YR  9/8/2020  IR REMOVE TUNL CVAD W/O PORT / PUMP  6/26/2020  IR REMOVE TUNL CVAD W/O PORT / PUMP  10/19/2020  [address]  8.21.07  
 OK [address] AND 1994  OK RELOCATION OF SKIN POCKET FOR PACEMAKER N/A 4/24/2020 RELOCATE 2 [facility] performed [place] [address] [address] CATH LAB  OK RMVL TRANSVNS PM ELTRD 1 LEAD SYS ATR/VENTR N/A 4/24/2020 Remove Pacemaker Dual Leads performed by Caren Christensen MD at [facility] CATH LAB  OK RMVL TRANSVNS PM ELTRD 1 LEAD SYS ATR/VENTR N/A 4/27/2020 REMOVE PACEMAKER DUAL LEADS [place] Caren [place] at [address] [address] CATH LAB  OK TRABECULOPLASTY BY LASER SURGERY    
 US GUIDE ASP OVARIAN CYST ABD APPROACH  8.21.07  
 RIGHT Medications: No current facility-administered medications on file prior to encounter. Current Outpatient Medications on File Prior to Encounter Medication Sig Dispense Refill  potassium chloride SR (Klor-Con 10) 10 mEq tablet Take  by mouth.  metoprolol tartrate (LOPRESSOR) 25 mg tablet Take 1 Tab by mouth two (2) times a day. 60 Tab 3  
 sertraline (ZOLOFT) 25 mg tablet Take 25 mg by mouth daily. Take with 50 mg to total 75 mg  bumetanide (BUMEX) 1 mg tablet Take 0.5 mg by mouth two (2) times a day.  risperidone (RISPERDAL PO) Take  by mouth.  insulin glargine (Lantus Solostar U-100 Insulin) 100 unit/mL (3 mL) inpn 10 Units by SubCUTAneous route Daily (before breakfast).  sertraline (Zoloft) 50 mg tablet Take 75 mg by mouth daily.  warfarin (COUMADIN) 2 mg tablet Take 1 tablet po daily (Patient taking differently: Take 2 mg by mouth daily. 2 mg M-W-FR and 1 all other days usually managed by PCP) 50 Tab 5  hydrALAZINE (APRESOLINE) 100 mg tablet Take 1 Tab by mouth three (3) times daily. 90 Tab 11  
 busPIRone (BUSPAR) 10 mg tablet TAKE ONE TABLET BY MOUTH TWICE A DAY 60 Tab 10 Family History: 
Family History Problem Relation Age of Onset  Cancer Mother  Heart Disease Mother  Alcohol abuse Father  Diabetes Brother  Hypertension Brother Social History: 
Social History Tobacco Use  Smoking status: Never Smoker  Smokeless tobacco: Never Used Substance Use Topics  Alcohol use: Not Currently  Drug use: No  
 
 
Allergies: Allergies Allergen Reactions  Ace Inhibitors Cough  Amlodipine Swelling  Avapro [Irbesartan] Unable to Obtain  Bactrim [Sulfamethoxazole-Trimethoprim] Other (comments) Sore mouth  Captopril Cough  Carvedilol Diarrhea  Contrast Agent [Iodine] Itching [address]  Morphine Nausea and Vomiting and Swelling  Penicillins Hives Did not tolerate cefepime 3/2020  Pravachol [Pravastatin] Nausea Only  Seafood [Shellfish Containing Products] Hives  Singulair [Montelukast] Other (comments)  
  palpitations All the above components of the past  history are auto-populated from the electronic record. They have been reviewed and the patient has been interviewed for any pertinent past history that pertains to the patient's chief complaint and reason for visit. Not all pre-populated components may be accurate at the time this note was generated. Review of Systems Review of Systems Constitutional: Positive for appetite change. Negative for chills and fever. HENT: Negative for congestion, ear pain, rhinorrhea, sore throat and trouble swallowing. Eyes: Negative for visual disturbance. Respiratory: Negative for cough, chest tightness and shortness of breath. Cardiovascular: Negative for chest pain and palpitations. Gastrointestinal: Positive for abdominal pain and nausea. Negative for blood in stool, constipation, diarrhea and vomiting. Genitourinary: Negative for decreased urine volume, difficulty urinating, dysuria and frequency. Musculoskeletal: Negative for back pain and neck pain. Skin: Negative for color change and rash. Neurological: Negative for dizziness, weakness, light-headedness and headaches. Physical Exam  
Physical Exam 
Vitals signs and nursing note reviewed. Constitutional:   
   General: She is not in acute distress. Appearance: She is well-developed. She is obese. She is not ill-appearing. HENT:  
   Head: Normocephalic. Eyes:  
   Conjunctiva/sclera: Conjunctivae normal.  
Neck: Musculoskeletal: Normal range of motion. Cardiovascular:  
   Rate and Rhythm: Normal rate and regular rhythm. Pulmonary:  
   Effort: Pulmonary effort is normal. No accessory muscle usage or respiratory distress. Abdominal:  
   General: There is no distension. Tenderness: There is abdominal tenderness in the right lower quadrant. Comments: Possible hernia in the right lower quadrant. Exam for hernia is limited due to super morbid obesity Skin: 
   General: Skin is warm and dry. Neurological:  
   Mental Status: She is alert and oriented to person, place, and time.   
 
 
 
Due to the COVID-19 pandemic, in order to reduce the spread and transmission of the virus, some basic elements of the physical exam have been deferred to reduce direct or close contact with the patient unless they are deemed to be absolutely necessary, regardless of whether the virus is highly suspected or not. Diagnostic Study Results Labs - Recent Results (from the past 24 hour(s)) AMB POC URINALYSIS DIP STICK AUTO W/ MICRO Collection Time: 11/05/20 11:17 AM  
Result Value Ref Range Color (UA POC) Yellow Clarity (UA POC) Slightly Cloudy Glucose (UA POC) Negative Negative Bilirubin (UA POC) Negative Negative Ketones (UA POC) Negative Negative Specific gravity (UA POC) 1.015 1.001 - 1.035 Blood (UA POC) Negative Negative pH (UA POC) 5.0 4.6 - 8.0 Protein (UA POC) 1+ Negative Urobilinogen (UA POC) 0.2 mg/dL 0.2 - 1 Nitrites (UA POC) Positive Negative Leukocyte esterase (UA POC) 1+ Negative CBC WITH AUTOMATED DIFF Collection Time: 11/05/20 12:25 PM  
Result Value Ref Range WBC 6.0 3.6 - 11.0 K/uL  
 RBC 3.32 (L) 3.80 - 5.20 M/uL HGB 8.7 (L) 11.5 - 16.0 g/dL HCT 29.7 (L) 35.0 - 47.0 % MCV 89.5 80.0 - 99.0 FL  
 MCH 26.2 26.0 - 34.0 PG  
 MCHC 29.3 (L) 30.0 - 36.5 g/dL  
 RDW 16.4 (H) 11.5 - 14.5 % PLATELET 286 618 - 174 K/uL MPV 9.9 8.9 - 12.9 FL  
 NRBC 0.7 (H) 0  WBC ABSOLUTE NRBC 0.04 (H) 0.00 - 0.01 K/uL NEUTROPHILS 71 32 - 75 % LYMPHOCYTES 13 12 - 49 % MONOCYTES 12 5 - 13 % EOSINOPHILS 3 0 - 7 % BASOPHILS 1 0 - 1 % IMMATURE GRANULOCYTES 0 0.0 - 0.5 % ABS. NEUTROPHILS 4.2 1.8 - 8.0 K/UL  
 ABS. LYMPHOCYTES 0.8 0.8 - 3.5 K/UL  
 ABS. MONOCYTES 0.7 0.0 - 1.0 K/UL  
 ABS. EOSINOPHILS 0.2 0.0 - 0.4 K/UL  
 ABS. BASOPHILS 0.1 0.0 - 0.1 K/UL  
 ABS. IMM. GRANS. 0.0 0.00 - 0.04 K/UL  
 DF SMEAR SCANNED    
 RBC COMMENTS ANISOCYTOSIS 
1+ METABOLIC PANEL, COMPREHENSIVE Collection Time: 11/05/20 12:25 PM  
Result Value Ref Range Sodium 137 136 - 145 mmol/L Potassium 5.0 3.5 - 5.1 mmol/L Chloride 113 (H) 97 - 108 mmol/L  
 CO2 18 (L) 21 - 32 mmol/L  Anion gap 6 5 - 15 mmol/L  
 Glucose 171 (H) 65 - 100 mg/dL BUN 35 (H) 6 - 20 MG/DL Creatinine 2.28 (H) 0.55 - 1.02 MG/DL  
 BUN/Creatinine ratio 15 12 - 20 GFR est AA 26 (L) >60 ml/min/1.73m2 GFR est non-AA 22 (L) >60 ml/min/1.73m2 Calcium 9.0 8.5 - 10.1 MG/DL Bilirubin, total 0.5 0.2 - 1.0 MG/DL  
 ALT (SGPT) 19 12 - 78 U/L  
 AST (SGOT) 16 15 - 37 U/L Alk. phosphatase 146 (H) 45 - 117 U/L Protein, total 7.4 6.4 - 8.2 g/dL Albumin 3.5 3.5 - 5.0 g/dL Globulin 3.9 2.0 - 4.0 g/dL A-G Ratio 0.9 (L) 1.1 - 2.2 URINALYSIS W/ RFLX MICROSCOPIC Collection Time: 11/05/20  7:18 PM  
Result Value Ref Range Color YELLOW/STRAW Appearance CLOUDY (A) CLEAR Specific gravity 1.016 1.003 - 1.030    
 pH (UA) 5.5 5.0 - 8.0 Protein 100 (A) NEG mg/dL Glucose Negative NEG mg/dL Ketone Negative NEG mg/dL Bilirubin Negative NEG Blood Negative NEG Urobilinogen 1.0 0.2 - 1.0 EU/dL Nitrites Negative NEG Leukocyte Esterase LARGE (A) NEG    
 WBC >100 (H) 0 - 4 /hpf  
 RBC 5-10 0 - 5 /hpf Epithelial cells FEW FEW /lpf Bacteria 4+ (A) NEG /hpf POC LACTIC ACID Collection Time: 11/05/20  7:30 PM  
Result Value Ref Range Lactic Acid (POC) 0.97 0.40 - 2.00 mmol/L Radiologic Studies -  
CT ABD PELV WO CONT Final Result IMPRESSION:  
  
1. Marked bladder wall edema but no hernia 2. Exophytic elongated mass off the left hepatic lobe of uncertain etiology. Recommend ultrasound to further evaluate confirm origin from the liver and  
differentiate vascular from solid lesion 3. Small right pleural effusion CT Results  (Last 48 hours) 11/05/20 1730  CT ABD PELV WO CONT Final result Impression:  IMPRESSION:  
   
1. Marked bladder wall edema but no hernia 2. Exophytic elongated mass off the left hepatic lobe of uncertain etiology.   
Recommend ultrasound to further evaluate confirm origin from the liver and  
 differentiate vascular from solid lesion 3. Small right pleural effusion Narrative:  EXAM: CT ABD PELV WO CONT INDICATION: RLQ pain, possible hernia COMPARISON: None CONTRAST:  None. TECHNIQUE:   
Thin axial images were obtained through the abdomen and pelvis. Coronal and  
sagittal reformats were generated. Oral contrast was not administered. CT dose  
reduction was achieved through use of a standardized protocol tailored for this  
examination and automatic exposure control for dose modulation. The absence of intravenous contrast material reduces the sensitivity for  
evaluation of the vasculature and solid organs. FINDINGS:   
LOWER THORAX: Small right pleural effusion. There are coronary artery  
calcifications LIVER: Liver is slightly nodular. There is an exophytic mass off the left  
hepatic lobe measuring 6.5 x 2.9 x 2.1 cm. BILIARY TREE: Surgically absent CBD is not dilated. SPLEEN: within normal limits. PANCREAS: No focal abnormality. ADRENALS: 2.9 cm right adrenal adenoma measuring 9 Hounsfield units. Left  
adrenal gland is not enlarged KIDNEYS/URETERS: No calculus or hydronephrosis. STOMACH: Post gastric bypass SMALL BOWEL: No dilatation or wall thickening. COLON: No dilatation or wall thickening. APPENDIX: Not enlarged or inflamed PERITONEUM: No ascites or pneumoperitoneum. RETROPERITONEUM: No lymphadenopathy or aortic aneurysm. Severe vascular  
calcifications REPRODUCTIVE ORGANS: Not enlarged URINARY BLADDER: No mass or calculus. BONES: Diffuse osteopenia ABDOMINAL WALL: Marked edema of intra-abdominal wall. A discrete hernia is not  
identified ADDITIONAL COMMENTS: N/A  
   
  
  
 
CXR Results  (Last 48 hours) None Medical Decision Making I reviewed the vital signs, available nursing notes, past medical history, past surgical history, family history and social history. Vital Signs-I have reviewed the vital signs that have been made available during the patient's emergency department visit. The vital signs auto-populated below are obtained mostly by electronic means through monitoring devices that have been downloaded into the patient's chart by the nursing staff. Some vital signs are not downloaded into the chart until after the patient has been discharged and this note has been completed, therefore some vital signs may not be available to the physician for review prior to patient's discharge or admission. The physician has reviewed the patient's triage vital signs, monitored the electronic monitoring devices remotely for any significant vital sign abnormalities, and have reviewed vital signs prior to discharge. Some vital signs reviewed at bedside or remotely utilizing electronic monitoring devices may be different than the vital signs downloaded into the electronic medical record. Some vital signs may be erroneous and inaccurate since they are obtained by electronic monitoring devices, and not all vital signs are verified for accuracy by nursing staff prior to downloading into the patient's chart. Patient Vitals for the past 24 hrs: 
 Temp Pulse Resp BP SpO2  
11/05/20 2015 98 °F (36.7 °C) 95 18 (!) 169/104 98 % 11/05/20 2000    (!) 165/116 97 % 11/05/20 1945    (!) 159/97 98 % 11/05/20 1925 98 °F (36.7 °C) 99 20 (!) 141/91 99 % 11/05/20 1709     100 % 11/05/20 1438 98 °F (36.7 °C) 99 16 (!) 145/88 100 % 11/05/20 1218 97.7 °F (36.5 °C) (!) 117 16 138/64 100 % Records Reviewed: Nursing notes for today's visit have been reviewed. I have also reviewed most recent medical records pertinent to today's complaints, if available in our medical record system. I have also reviewed all labs and imaging results from previous results in comparison to results obtained today.   If an EKG was obtained today, it has been compared to previous EKGs, if available. If arriving via EMS, the EMS report has been reviewed if made available to us within the patient's time in the emergency department. Provider Notes (Medical Decision Making):  
Patient presents with suprapubic and right lower quadrant abdominal pain. She states she feels a lump which on exam may be a hernia but difficult to tell. CT scan does not show any hernia on imaging. She does have a bladder infection based on urinalysis. Rest of her labs are pretty unremarkable. Her creatinine is near her baseline. No signs of appendicitis. No abdominal wall abscess or cellulitis on exam. 
 
ED Course:  
Initial assessment performed. The patients presenting problems have been discussed, and they are in agreement with the care plan formulated and outlined with them. I have encouraged them to ask questions as they arise throughout their visit. Orders Placed This Encounter  CT ABD PELV WO CONT  CBC WITH AUTOMATED DIFF  
 METABOLIC PANEL, COMPREHENSIVE  
 URINALYSIS W/ RFLX MICROSCOPIC  LACTIC ACID, PLASMA  *UA&MICRO CHARGE BAT  DIET NPO  ABD PAIN PANEL TRACKING (DO NOT DESELECT)  POC LACTIC ACID  fentaNYL citrate (PF) injection 50 mcg  cephALEXin (Keflex) 500 mg capsule  HYDROcodone-acetaminophen (Lorcet, HYDROcodone,) 5-325 mg per tablet  ondansetron (Zofran ODT) 4 mg disintegrating tablet Procedures Critical Care Time:  
0 Disposition: 
Discharge The patient's emergency department evaluation is now complete. I have reviewed all labs, imaging, and pertinent information. I have discussed all results with the patient and/or family. Based on our evaluation today I do believe that the patient is safe to be discharged home.   The patient has been provided with at home instructions that are pertinent to their complaint today, although these may not be specific to the exact diagnosis. I have reviewed the patient's home medications and attempted to reconcile if not already done so by pharmacy or nursing staff. I have discussed all new prescriptions with the patient. The patient has been encouraged to follow-up with primary care doctor and/or specialist, and these have been discussed with the patient. The patient has been advised that they may return to the emergency department if they have any worsening symptoms and or new symptoms that are of concern to them. Verbal discharge instructions may have also been provided to the patient that may not be specifically contained in the written discharge instructions. The patient has been given opportunity to ask questions prior to discharge. PLAN: 
1. Current Discharge Medication List  
  
START taking these medications Details  
cephALEXin (Keflex) 500 mg capsule Take 1 Cap by mouth four (4) times daily for 7 days. Qty: 28 Cap, Refills: 0 HYDROcodone-acetaminophen (Lorcet, HYDROcodone,) 5-325 mg per tablet Take 1 Tab by mouth every six (6) hours as needed for Pain for up to 3 days. Max Daily Amount: 4 Tabs. Qty: 12 Tab, Refills: 0 Associated Diagnoses: Acute cystitis without hematuria; Abdominal pain, right lower quadrant  
  
ondansetron (Zofran ODT) 4 mg disintegrating tablet Take 1 Tab by mouth every eight (8) hours as needed for Nausea. Qty: 10 Tab, Refills: 0  
  
  
 
2. Follow-up Information Follow up With Specialties Details Why Contact Info Li Barrera MD Internal Medicine Schedule an appointment as soon as possible for a visit in 1 week  [address] [address] [phone] Return to ED if worse Diagnosis Clinical Impression: 1. Acute cystitis without hematuria 2. Abdominal pain, right lower quadrant

## 2020-11-05 NOTE — PROGRESS NOTES
ZAINAB Manjarrez is a 60y.o. year old female patient of Jarrett Gutierrez MD who presents with c/o knot to stomach. Pt has history of has History of breast cancer, Asthma, Fe deficiency anemia, Status post ablation of atrial fibrillation, Sick sinus syndrome ([address] S/P cardiac pacemaker procedure, Long term (current) use of anticoagulants, Mixed hyperlipidemia, CKD (chronic kidney disease), stage IV (HCC), Systolic murmur, Essential hypertension, PAF (paroxysmal atrial fibrillation) ([address] Anemia in stage 4 chronic kidney disease ([address] Controlled type 2 diabetes mellitus with stage 4 chronic kidney disease, with long-term current use of insulin ([address] Morbid obesity with BMI of 40.0-44.9, adult ([address] 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 Pt c/o hard painful (10/10) lump to right side of stomach, noticed it a few days ago. Pain is constant. Feels lump that starts on right lower abdomen down to right groin. Pt has hx of gastric bypass, tummy tuck, and ovarian cyst surgeries. Has some nausea. Eating and drinking ok. Stools are normal- last BM this AM. Denies blood in stool. Denies diarrhea. Denies fevers or chills. Nothing makes pain worse or better. Taking tylenol which isn't helping. She denies urinary symptoms. Weight is up 12lbs in 7 days. She has chronic SOB and CHOW, follows with Cardiology, due for pacemaker procedure on 11-17. Patient Active Problem List  
Diagnosis Code  History of breast cancer Z85.3  Asthma J45.909  Fe deficiency anemia D50.9  Status post ablation of atrial fibrillation Z98.890, Z86.79  
 Sick sinus syndrome ([facility]) I49.5  S/P cardiac pacemaker procedure Z95.0  Long term (current) use of anticoagulants Z79.01  
 Mixed hyperlipidemia E78.2  CKD (chronic kidney disease), stage IV ([facility]) N18.4  Systolic murmur I52.3  Essential hypertension I10  
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 R30.1703  Diabetic polyneuropathy associated with type 2 diabetes mellitus ([facility]) E11.42  Venous stasis ulcer of right lower leg with edema of right lower leg ([facility]) I83.019, I83.891, L97.919, R60.9  Diabetic ulcer of right midfoot associated with type 2 diabetes mellitus, with fat layer exposed ([address] E11.621, K14.497  Foot deformity, right M21.961  Pes cavus Q66.70  
 H/O bilateral mastectomy Z90.13  Left below-knee amputee [facility]) L73.158 Past Medical History:  
Diagnosis Date  Acquired lymphedema Right arm  Arrhythmia  Asthma  Atrial fibrillation ([address] Dr. Floria Boas and Dr. Gil alves [facility])  Cancer ([facility] bilateral breast  
 Chronic kidney disease  Diabetes mellitus type II   
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 ([address] 3/3/2020  Dizziness  Essential hypertension  Hyperlipidemia  Hypertension  Long term (current) use of anticoagulants  Morbid obesity ([facility] 3/14/2012  Nausea & vomiting  Neuropathy  Osteoarthritis  Pacemaker  Sick sinus syndrome ([facility]  Type 2 diabetes mellitus with left diabetic foot infection ([facility] 10/29/2019 Past Surgical History:  
Procedure Laterality Date  ABDOMEN SURGERY PROC UNLISTED    
 gastric bypass  GASTRIC BYPASS,OBESE<150CM SAW-EN-Y  7/08  
 [facility]  HX AFIB ABLATION    
 HX AMPUTATION FOOT Left 04/2020  HX BREAST RECONSTRUCTION Bilateral   
 HX CARPAL TUNNEL RELEASE [address] [address] [facility] [address] [place] [address] RIGHT MODIFIED RADICAL   
 HX MASTECTOMY Left   
 no lymphnode removal  
 HX MOHS PROCEDURES  1995  
 right  HX ORTHOPAEDIC Right   
 knee surgery  HX PACEMAKER    
 HX PACEMAKER    
 IR INSERT TUNL CVC W PORT OVER 5 YEARS    
 IR INSERT TUNL CVC W/O PORT OVER 5 YR  5/4/2020  IR INSERT TUNL CVC W/O PORT OVER 5 YR  9/8/2020  IR REMOVE TUNL CVAD W/O PORT / PUMP  6/26/2020  IR REMOVE TUNL CVAD W/O PORT / PUMP  10/19/2020  [address] [facility] [address]  8.21.07  
 MA [address] 9[address] AND 1994  MA RELOCATION OF SKIN POCKET FOR PACEMAKER N/A 4/24/2020 RELOCATE 2 [facility] performed by David Singleton MD at [address] CATH LAB  MA RMVL TRANSVNS PM ELTRD 1 LEAD SYS ATR/VENTR N/A 4/24/2020 Remove Pacemaker Dual Leads performed by David Singleton MD at [address] CARDIAC CATH LAB  MA RMVL TRANSVNS PM ELTRD 1 LEAD SYS ATR/VENTR N/A 4/27/2020 REMOVE PACEMAKER DUAL LEADS performed by David Singleton MD at [address] CATH LAB  MA TRABECULOPLASTY BY LASER SURGERY    
 US GUIDE ASP OVARIAN CYST ABD APPROACH  8.21.07  
 RIGHT Social History Socioeconomic History  Marital status:  Spouse name: Not on file  Number of children: Not on file  Years of education: Not on file  Highest education level: Not on file Tobacco Use  Smoking status: Never Smoker  Smokeless tobacco: Never Used Substance and Sexual Activity  Alcohol use: Not Currently  Drug use: No  
 
Family History Problem Relation Age of Onset  Cancer Mother  Heart Disease Mother  Alcohol abuse Father  Diabetes Brother  Hypertension Brother Allergies Allergen Reactions  Ace Inhibitors Cough  Amlodipine Swelling  Avapro [Irbesartan] Unable to Obtain  Bactrim [Sulfamethoxazole-Trimethoprim] Other (comments) Sore mouth  Captopril Cough  Carvedilol Diarrhea  Contrast Agent [Iodine] Itching [address]  Morphine Nausea and Vomiting and Swelling  Penicillins Hives Did not tolerate cefepime 3/2020  Pravachol [Pravastatin] Nausea Only  Seafood [Shellfish Containing Products] Hives  Singulair [Montelukast] Other (comments)  
  palpitations MEDICATIONS Current Outpatient Medications Medication Sig  potassium chloride SR (Klor-Con 10) 10 mEq tablet Take  by mouth.  metoprolol tartrate (LOPRESSOR) 25 mg tablet Take 1 Tab by mouth two (2) times a day.  sertraline (ZOLOFT) 25 mg tablet Take 25 mg by mouth daily. Take with 50 mg to total 75 mg  bumetanide (BUMEX) 1 mg tablet Take 0.5 mg by mouth two (2) times a day.  insulin glargine (Lantus Solostar U-100 Insulin) 100 unit/mL (3 mL) inpn 10 Units by SubCUTAneous route Daily (before breakfast).  sertraline (Zoloft) 50 mg tablet Take 75 mg by mouth daily.  warfarin (COUMADIN) 2 mg tablet Take 1 tablet po daily (Patient taking differently: Take 2 mg by mouth daily. 2 mg M-W-FR and 1 all other days usually managed by PCP)  hydrALAZINE (APRESOLINE) 100 mg tablet Take 1 Tab by mouth three (3) times daily.   
 busPIRone (BUSPAR) 10 mg tablet TAKE ONE TABLET BY MOUTH TWICE A DAY  
  risperidone (RISPERDAL PO) Take  by mouth. No current facility-administered medications for this visit. REVIEW OF SYSTEMS Per HPI Visit Vitals BP (!) 146/93 (BP 1 Location: Left arm, BP Patient Position: Sitting) Pulse (!) 114 Temp 98.1 °F (36.7 °C) (Oral) Resp 14 Ht 5' 10\" (1.778 m) Wt 268 lb (121.6 kg) SpO2 99% BMI 38.45 kg/m² General: Well-developed, well-nourished. In no distress. A&O x 3. Head: Normocephalic, atraumatic. Eyes: Conjunctiva clear. Pupils equal, round, reactive to light. Extraocular movements intact. Ears/Nose: TM's and ear canals normal bilaterally. Nares normal. Septum midline. Normal nasal mucosa. No drainage or sinus tenderness. Mouth/Throat: Lips, mucosa, and tongue normal. Oropharynx benign. Neck: Supple, symmetrical, trachea midline, no lymphadenopathy, no carotid bruits, no JVD, thyroid: not enlarged, symmetric, no tenderness/mass/nodules. Lungs: Clear to auscultation bilaterally. No crackles or wheezes. No use of accessory muscles. Speaks in full sentences without SOB. She has CHOW that is chronic Chest Wall: No tenderness or deformity. Heart: RRR, normal S1 and S2, no murmur, click, rub, or gallop. Back: Symmetric. ROM intact. Abdomen: Bowel sounds normal. Bilat pelvic diffuse tenderness. Edema noted to RLQ and pelvic region. No hepatosplenomegaly. Poli Bees Skin: No rashes or lesions. Musculoskeletal: Gait normal.  
Psychiatric: Normal mood and affect. Behavior is normal.  
 
 
Results for orders placed or performed during the hospital encounter of 11/05/20 CBC WITH AUTOMATED DIFF Result Value Ref Range WBC 6.0 3.6 - 11.0 K/uL  
 RBC 3.32 (L) 3.80 - 5.20 M/uL HGB 8.7 (L) 11.5 - 16.0 g/dL HCT 29.7 (L) 35.0 - 47.0 % MCV 89.5 80.0 - 99.0 FL  
 MCH 26.2 26.0 - 34.0 PG  
 MCHC 29.3 (L) 30.0 - 36.5 g/dL  
 RDW 16.4 (H) 11.5 - 14.5 % PLATELET 953 370 - 577 K/uL MPV 9.9 8.9 - 12.9 FL  
 NRBC 0.7 (H) 0  WBC ABSOLUTE NRBC 0.04 (H) 0.00 - 0.01 K/uL NEUTROPHILS 71 32 - 75 % LYMPHOCYTES 13 12 - 49 % MONOCYTES 12 5 - 13 % EOSINOPHILS 3 0 - 7 % BASOPHILS 1 0 - 1 % IMMATURE GRANULOCYTES 0 0.0 - 0.5 % ABS. NEUTROPHILS 4.2 1.8 - 8.0 K/UL  
 ABS. LYMPHOCYTES 0.8 0.8 - 3.5 K/UL  
 ABS. MONOCYTES 0.7 0.0 - 1.0 K/UL  
 ABS. EOSINOPHILS 0.2 0.0 - 0.4 K/UL  
 ABS. BASOPHILS 0.1 0.0 - 0.1 K/UL  
 ABS. IMM. GRANS. 0.0 0.00 - 0.04 K/UL  
 DF SMEAR SCANNED    
 RBC COMMENTS ANISOCYTOSIS 
1+ METABOLIC PANEL, COMPREHENSIVE Result Value Ref Range Sodium 137 136 - 145 mmol/L Potassium 5.0 3.5 - 5.1 mmol/L Chloride 113 (H) 97 - 108 mmol/L  
 CO2 18 (L) 21 - 32 mmol/L Anion gap 6 5 - 15 mmol/L Glucose 171 (H) 65 - 100 mg/dL BUN 35 (H) 6 - 20 MG/DL Creatinine 2.28 (H) 0.55 - 1.02 MG/DL  
 BUN/Creatinine ratio 15 12 - 20 GFR est AA 26 (L) >60 ml/min/1.73m2 GFR est non-AA 22 (L) >60 ml/min/1.73m2 Calcium 9.0 8.5 - 10.1 MG/DL Bilirubin, total 0.5 0.2 - 1.0 MG/DL  
 ALT (SGPT) 19 12 - 78 U/L  
 AST (SGOT) 16 15 - 37 U/L Alk. phosphatase 146 (H) 45 - 117 U/L Protein, total 7.4 6.4 - 8.2 g/dL Albumin 3.5 3.5 - 5.0 g/dL Globulin 3.9 2.0 - 4.0 g/dL A-G Ratio 0.9 (L) 1.1 - 2.2 Results for orders placed or performed in visit on 11/05/20 AMB POC URINALYSIS DIP STICK AUTO W/ MICRO Result Value Ref Range Color (UA POC) Yellow Clarity (UA POC) Slightly Cloudy Glucose (UA POC) Negative Negative Bilirubin (UA POC) Negative Negative Ketones (UA POC) Negative Negative Specific gravity (UA POC) 1.015 1.001 - 1.035 Blood (UA POC) Negative Negative pH (UA POC) 5.0 4.6 - 8.0 Protein (UA POC) 1+ Negative Urobilinogen (UA POC) 0.2 mg/dL 0.2 - 1 Nitrites (UA POC) Positive Negative Leukocyte esterase (UA POC) 1+ Negative ASSESSMENT and PLAN Diagnoses and all orders for this visit: 
 
 Pt with 12lb weight gain in 1 week, sudden onset of bilat pelvic pain and edema, concern for pelvic fluid retention recommend ED for poss CT, labs, etc. Urine is suggestive of UTI though do not suspect this is cause of her fluid retention and pain. 1. Pelvic pain -     AMB POC URINALYSIS DIP STICK AUTO W/ MICRO 2. Pelvic fluid collection 3. Abnormal weight gain Patient Instructions Please proceed to the ED for further eval.  
 
 
Please keep your follow-up appointment with Yesenia Chavez MD. Health Maintenance Due Topic Date Due  Shingrix Vaccine Age 50> (1 of 2) 11/11/2009  Colorectal Cancer Screening Combo  04/21/2020  Flu Vaccine (1) 09/01/2020  Lipid Screen  11/11/2020  MICROALBUMIN Q1  11/13/2020 I have discussed the diagnosis with the patient and the intended plan as seen in the above orders. Patient is in agreement. The patient has received an after-visit summary and questions were answered concerning future plans. I have discussed medication side effects and warnings with the patient as well. Warning signs for the above conditions were discussed including when to call our office or go to the emergency room. The nurse provided the patient and/or family with advanced directive information if needed and encouraged the patient to provide a copy to the office when available.

## 2020-11-06 NOTE — ED NOTES
Bedside shift change report given to Chica RN (oncoming nurse) by Deb Garcia RN (offgoing nurse). Report included the following information SBAR, Kardex, ED Summary, [facility] and Recent Results. Pt reports pain to be 1/10 at this point. Family member at bedside, pt in no acute distress at this time. 8:52 PM: I have reviewed discharge instructions with the patient. The patient verbalized understanding. IV removed and pt to be transported by family member.

## 2020-11-06 NOTE — DISCHARGE INSTRUCTIONS
Thank you for visiting our emergency department today. We all do hope that we were able to assist you in your emergent needs today. Please read over your discharge instructions as these contain pertinent information to help you in the healing process. These instructions include a list of prescriptions you were given today. Follow-up information is also noted on your discharge papers. There are attached instructions and information pertaining to the reason why you were seen in the emergency department today. These discharge instructions may not be for exactly why you were here, but may be the closest available instructions that we have. These include important advice for things that you can do at home to feel better, and reasons to return to the emergency department. The evaluation and treatment you received in the emergency department is not always definitive care. If follow-up with your primary care doctor or specialist was recommended, it is important that you make these appointments for follow-up care. You may need further testing, procedures, and/or medications to help you feel better. Further tests may be required that are not available in the emergency department. Failure to make these follow-up appointments may jeopardize your health. The emergency department is here for emergent stabilization and evaluation of life and limb threatening illness and/or injuries. Further care through a specialist or primary care doctor may be required to assist in your healing and complete your treatment and/or evaluation. We may not always be able to make a diagnosis in the emergency department, or things may change that will alter your diagnosis. Our primary goal is to ensure that nothing serious is occurring and that you are stable to continue your treatment and evaluation at home as an outpatient.   Of course, if things change, and you feel worse, you are always encouraged to return to the emergency department for re-evaluation. Lab Results Today:  Recent Results (from the past 8 hour(s))   URINALYSIS W/ RFLX MICROSCOPIC    Collection Time: 11/05/20  7:18 PM   Result Value Ref Range    Color YELLOW/STRAW      Appearance CLOUDY (A) CLEAR      Specific gravity 1.016 1.003 - 1.030      pH (UA) 5.5 5.0 - 8.0      Protein 100 (A) NEG mg/dL    Glucose Negative NEG mg/dL    Ketone Negative NEG mg/dL    Bilirubin Negative NEG      Blood Negative NEG      Urobilinogen 1.0 0.2 - 1.0 EU/dL    Nitrites Negative NEG      Leukocyte Esterase LARGE (A) NEG      WBC >100 (H) 0 - 4 /hpf    RBC 5-10 0 - 5 /hpf    Epithelial cells FEW FEW /lpf    Bacteria 4+ (A) NEG /hpf   POC LACTIC ACID    Collection Time: 11/05/20  7:30 PM   Result Value Ref Range    Lactic Acid (POC) 0.97 0.40 - 2.00 mmol/L        Radiology Results Today:  Ct Abd Pelv Wo Cont    Result Date: 11/5/2020  IMPRESSION: 1. Marked bladder wall edema but no hernia 2. Exophytic elongated mass off the left hepatic lobe of uncertain etiology. Recommend ultrasound to further evaluate confirm origin from the liver and differentiate vascular from solid lesion 3.  Small right pleural effusion

## 2020-11-13 NOTE — PROGRESS NOTES
Odette Abrams  Identified pt with two pt identifiers(name and ). Chief Complaint Patient presents with  
[facility] Follow Up Reviewed record In preparation for visit and have obtained necessary documentation. Advanced directive / living will on file. 1. Have you been to the ER, urgent care clinic or hospitalized since your last visit? [id number] [place] [facility] 2020  
 
2. Have you seen or consulted any other health care providers outside of the [address] [place] [address] since your last visit? Include any pap smears or colon screening. No 
 
Vitals reviewed with provider. Health Maintenance reviewed:  
 
Health Maintenance Due Topic  Shingrix Vaccine Age 50> (1 of 2)  Colorectal Cancer Screening Combo  MICROALBUMIN Q1   
 Lipid Screen Wt Readings from Last 3 Encounters:  
20 268 lb 1.3 oz (121.6 kg) 20 268 lb (121.6 kg) 10/28/20 256 lb (116.1 kg) Temp Readings from Last 3 Encounters:  
20 98.1 °F (36.7 °C) 20 98.1 °F (36.7 °C) (Oral) 10/27/20 97.7 °F (36.5 °C) (Oral) BP Readings from Last 3 Encounters:  
20 (!) 169/91  
20 (!) 146/93  
10/28/20 (!) 140/82 Pulse Readings from Last 3 Encounters:  
20 95  
20 (!) 114  
10/28/20 100 Vitals:  
 20 0700 PainSc:   5 PainLoc: Abdomen Learning Assessment:  
:  
 
 
Learning Assessment 3/25/2014 PRIMARY LEARNER Patient HIGHEST LEVEL OF EDUCATION - PRIMARY LEARNER  2 YEARS OF COLLEGE  
BARRIERS PRIMARY LEARNER NONE  
CO-LEARNER CAREGIVER No  
PRIMARY LANGUAGE ENGLISH  NEED No  
LEARNER PREFERENCE PRIMARY DEMONSTRATION  
LEARNING SPECIAL TOPICS Does does a pacemaker any noise ANSWERED BY patient RELATIONSHIP SELF Depression Screening:  
:  
 
 
3 most recent PHQ Screens 10/27/2020 Little interest or pleasure in doing things Not at all Feeling down, depressed, irritable, or hopeless Not at all Total Score PHQ 2 0 Fall Risk Assessment:  
:  
 
 
Fall Risk Assessment, last 12 mths 8/15/2019 Able to walk? Yes Fall in past 12 months? No  
  
 
Abuse Screening:  
:  
 
 
Abuse Screening Questionnaire 11/13/2020 8/15/2019 Do you ever feel afraid of your partner? Darrel Friend Are you in a relationship with someone who physically or mentally threatens you? Darrel Friend Is it safe for you to go home? Y Y  
  
 
ADL Screening:  
:  
 
 
ADL Assessment 6/12/2020 Feeding yourself No Help Needed Getting from bed to chair Help Needed Getting dressed No Help Needed Bathing or showering No Help Needed Walk across the room (includes cane/walker) No Help Needed Using the telphone No Help Needed Taking your medications No Help Needed Preparing meals No Help Needed Managing money (expenses/bills) No Help Needed Moderately strenuous housework (laundry) No Help Needed Shopping for personal items (toiletries/medicines) No Help Needed Shopping for groceries No Help Needed Driving No Help Needed Climbing a flight of stairs Help Needed Getting to places beyond walking distances No Help Needed

## 2020-11-13 NOTE — PROGRESS NOTES
Spencer Singer is a 63 y.o. female, evaluated via audio-only technology on 11/13/2020 for Hospital Follow Up Galindo Rivera Assessment & Plan:  
Diagnoses and all orders for this visit: 
 
1. Acute cystitis without hematuria Continue taking Keflex. 2. RLQ abdominal pain Most likely due to #1. 
 
3. Generalized edema Instructed her to increase Bumex 1 mg from 1/2 tab BID to 1 tab BID for the next 5 days then resume 1/2 tab BID. 4. Hypokalemia Refill sent on KlorCon 20 meq daily. Follow-up and Dispositions · Return if symptoms worsen or fail to improve, for Scheduled appt on 1/6/21. 
  
 
 
12 
Subjective:  
 
Presents for ED follow up evaluation. Seen at [facility] ED on 11/5/20 for RLQ abdominal pain. Diagnosed with acute cystitis. CT abd pelvis showed marked bladder wall and intra-abdominal wall edema with no hernia. Discharged on Keflex, hydrocodone-APAP, and Zofran. Today reports still having some RLQ abd pain. Taking Keflex as prescribed. Complains of swelling across her abdomen and at thighs. Scheduled for pacemaker placement on 11/17/20. Prior to Admission medications Medication Sig Start Date End Date Taking? Authorizing Provider  
sertraline (ZOLOFT) 100 mg tablet  10/30/20  Yes Provider, Historical  
warfarin (Coumadin) 1 mg tablet 2 mg M-W-FR and 1 all other days   Yes Provider, Historical  
ondansetron (Zofran ODT) 4 mg disintegrating tablet Take 1 Tab by mouth every eight (8) hours as needed for Nausea. 11/5/20  Yes Jason Coyne MD  
potassium chloride SR (Klor-Con 10) 10 mEq tablet Take 20 mEq by mouth daily. Yes Provider, Historical  
metoprolol tartrate (LOPRESSOR) 25 mg tablet Take 1 Tab by mouth two (2) times a day. 10/13/20  Yes Tamar Stone ANP  
sertraline (ZOLOFT) 25 mg tablet Take 25 mg by mouth daily. Take with 50 mg to total 75 mg 10/6/20  Yes Provider, Historical  
bumetanide (BUMEX) 1 mg tablet Take 0.5 mg by mouth two (2) times a day. Yes Provider, Historical  
insulin glargine (Lantus Solostar U-100 Insulin) 100 unit/mL (3 mL) inpn 10 Units by SubCUTAneous route Daily (before breakfast). Yes Provider, Historical  
hydrALAZINE (APRESOLINE) 100 mg tablet Take 1 Tab by mouth three (3) times daily. 6/12/20  Yes Reymundo Duverney, MD  
busPIRone (BUSPAR) 10 mg tablet TAKE ONE TABLET BY MOUTH TWICE A DAY 5/24/19  Yes Reymundo Duverney, MD  
cephALEXin (Keflex) 500 mg capsule Take 1 Cap by mouth four (4) times daily for 7 days. 11/5/20 11/12/20  Maria Alejandra ARIAS MD  
sertraline (Zoloft) 50 mg tablet Take 75 mg by mouth daily. Provider, Historical  
 
Patient Active Problem List  
Diagnosis Code  History of breast cancer Z85.3  Asthma J45.909  Fe deficiency anemia D50.9  Status post ablation of atrial fibrillation Z98.890, Z86.79  
 Sick sinus syndrome ([facility]) I49.5  S/P cardiac pacemaker procedure Z95.0  Long term (current) use of anticoagulants Z79.01  
 Mixed hyperlipidemia E78.2  CKD (chronic kidney disease), stage IV ([facility]) N18.4  Systolic murmur Z39.4  Essential hypertension I10  
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  
  Pes cavus Q66.70  
 H/O bilateral mastectomy Z90.13  Left below-knee amputee [facility]) C36.785 ROS No flowsheet data found. Margi Espinal, who was evaluated through a patient-initiated, synchronous (real-time) audio only encounter, and/or her healthcare decision maker, is aware that it is a billable service, with coverage as determined by her insurance carrier. She provided verbal consent to proceed: Yes. She has not had a related appointment within my department in the past 7 days or scheduled within the next 24 hours. Total Time: minutes: 11-20 minutes Rigoberto Peterson MD

## 2020-11-17 PROBLEM — Z98.890 S/P ATRIOVENTRICULAR NODAL ABLATION: Status: ACTIVE | Noted: 2020-01-01

## 2020-11-17 NOTE — PROGRESS NOTES
TRANSFER - OUT REPORT: 
 
Verbal report given to Gill FAIRCHILD on Spencer Ahuja being transferred to [employer] for routine progression of care Report consisted of patients Situation, Background, Assessment and  
Recommendations(SBAR). Information from the following report(s) Kardex and Procedure Summary was reviewed with the receiving nurse. Opportunity for questions and clarification was provided.

## 2020-11-17 NOTE — ROUTINE PROCESS
Cardiac Cath Lab Recovery Arrival Note: 
 
 
Gwen Yeboah arrived to Cardiac Cath Lab, Recovery Area. Staff introduced to patient. Patient identifiers verified with NAME and DATE OF BIRTH. Procedure verified with patient. Consent forms reviewed and signed by patient or authorized representative and verified. Allergies verified. Patient and family oriented to department. Patient and family informed of procedure and plan of care. Questions answered with review. Patient prepped for procedure, per orders from physician, prior to arrival. 
 
Patient on cardiac monitor, non-invasive blood pressure, SPO2 monitor. On RA. Patient is A&Ox 3. Patient reports no pain. Patient in stretcher, in low position, with side rails up, call bell within reach, patient instructed to call if assistance as needed. Patient prep in: [address], [place] [address]. Patient family has pager # Family in: . Prep by: ELOY Koroma RN

## 2020-11-17 NOTE — DISCHARGE INSTRUCTIONS
Leadless Pacemaker  Discharge Instructions    Please make sure you have received your Temporary Pacemaker identification card with your discharge instructions      You had a leadless pacemaker insertion performed. There were catheters temporarily placed in your heart through a puncture in the Veins and/or Arteries in your groin. WHAT TO EXPECT      Mild to moderate, non-painful, bruising at the puncture site is not un-common, and will resolve in 7 - 10 days.  If a closure device was used to seal your artery or vein, a separate pamphlet will be given to you with these discharge instructions. It is very important that you review the information in the pamphlet for different restrictions and precautions.  You have a small gauze dressing applied to the puncture site in your groin. You may remove this the following morning. MEDICATIONS      Take only the medications prescribed to you at discharge. ACTIVITY      A responsible adult must take you home. Do not drive a car for 72 hours.  Rest quietly for the remainder of the day.  Do not lift anything greater than 10 pounds for 3 days.  Limit bending at the puncture site and use of stairs for at least 3 days.  You may remove the bandage and shower the morning after the procedure. Do not take a bath for 3 days. SYMPTOMS THAT NEED TO BE REPORTED IMMEDIATELY      Bleeding at the puncture site. If there is bleeding, lie down and hold firm direct pressure for at least 5 minutes. If the bleeding does not stop, go to the closest emergency room, or call 911.  Temperature more than 100.5 F.   Redness or warmth at the puncture site.  Increasing pain, numbness, coolness or blue discoloration of the extremity where the puncture is located.  Pulsating mass at the puncture site.  A new lump at the puncture site, or increasing swelling at the site.    Bruising at the puncture site that enlarges or becomes painful (some bruising at the site is common and will go away in 7 - 10 days).  Rapid heart rate or palpitations.  Dizziness, lightheadedness, fainting.  REMEMBER: If you feel something is an emergency or cannot be handled over the phone, call 911 or go to the closest emergency room. DISCHARGE PRECAUTIONS         Record your temperature every day, at the same time, for 3 weeks after your implant. A temperature of 100.5 F, or higher, can be the first sign of infection. This should be reported to your Doctor immediately.  You can have an MRI after 6 weeks. You must be aware that any strong magnet or magnetic field can affect your Pacemaker. In general, be careful of metal detectors, heavy machinery, and any area where arc-welding is performed. Avoid metal detectors such as the ones in security checkpoints at [facility] or [address] [place] [address]. When approaching a security checkpoint show your Pacemaker ID Card to security personnel and ask to be hand searched.  Always tell your doctor or dentist that you have a Pacemaker. Antibiotics may be prescribed before certain procedures.  Your temporary identification will be given to you with these instructions. Keep your Pacemaker card in your wallet or on your person at all times. You should receive your permanent card in 8 weeks. If you do not receive your permanent card, please call the office at [phone]. TAKING YOUR PULSE         Take your pulse the same time every day, preferably in the morning.  Sit down and rest for 5 minutes prior to taking your pulse.  Take your pulse for 1 full minute, use a clock or stop watch with a second hand.  To feel your pulse, use the first two fingers of one hand; place them on the thumb side of the wrist of the opposite hand. The pulse will be steady, regular and throbbing.  Call the EP Lab Doctors if your pulse is less than 40 beats per minute.       SYMPTOMS THAT NEED TO BE REPORTED IMMEDIATELY         Temperature more than 100.4 F     Redness or warmth at the incision site, or pain for longer than the first 5 days after the implant.  Drainage from the groin site.  Swelling around the groin site.  Shortness of breath.  Rapid heart rate or palpitations.  Dizziness, lightheadedness, fainting.  Slow pulse below 40 beats per minute.  REMEMBER: If you feel something is an emergency or cannot be handled over the phone, call 911 or go to the closest emergency room. Valentino Silver, MD  Cardiac Electrophysiology / Cardiology    [address]  [address] [facility], [address] [facility] [address]  [facility], [address]         [address] [facility]  [phone] / [phone] Fax       [phone] / [phone] Fax              Electrophysiology Study (EPS)/Cardiac Ablation   Discharge Instructions      You have just had a Cardiac Ablation for:  Atrial fibrillation. There were catheters temporarily placed in your heart through a puncture in the Veins and/or Arteries in your groin. WHAT TO EXPECT      If you have had a Cardiac Ablation please be aware that you may experience mild chest pain that will resolve within 24-48 hours. If the Chest Pain begins radiating down your shoulders or arms, becomes severe or breathing becomes difficult, call 911 or go to the closest emergency room.  Mild to moderate, non-painful, bruising or mild swelling at the puncture site is not un-common, and will resolve in 7 - 14 days, and may extend down your thigh as it heals.  If a closure device was used to seal your artery or vein, a separate pamphlet will be given to you with these discharge instructions. It is very important that you review the information in the pamphlet for different restrictions and precautions.      You have a small gauze dressing applied to the puncture site in your groin. You may remove this the following morning.  You MAY receive a 30 day heart monitor in the mail to wear after your procedure. This is to evaluate your heart rhythm post ablation. MEDICATIONS      Take only the medications prescribed to you at discharge. ACTIVITY      A responsible adult must take you home. Do not drive a car for 24 hours.  Rest quietly for the remainder of the day.  Do not lift anything greater than 10 pounds for 3 days.  Limit bending at the puncture site and use of stairs for at least 3 days.  You may remove the bandage and shower the morning after the procedure. Do not take a bath for 3 days. SYMPTOMS THAT NEED TO BE REPORTED IMMEDIATELY      Bleeding at the puncture site. If there is bleeding, lie down and hold firm direct pressure for at least 5 minutes. If the bleeding does not stop, go to the closest emergency room, or call 911.  Temperature more than 100.5 F.   Redness or warmth at the puncture site.  Increasing pain, numbness, coolness or blue discoloration of the extremity where the puncture is located.  Pulsating mass at the puncture site.  A new lump at the puncture site, or increasing swelling at the site. Please be aware that a pea or marble sized lump is normal, anything larger or increasing in size should be reported.  Bruising at the puncture site that enlarges or becomes painful (some bruising at the site is common and will go away in 7-14 days).  Rapid heart rate or palpitations.  Dizziness, lightheadedness, fainting.  REMEMBER: If you feel something is an emergency or cannot be handled over the phone, call 911 or go to the closest emergency room.       FOLLOW UP CARE     Jossy Trivedi NP in 2 weeks  Dr. Skinny Decker in 3 months        Winsome Patel MD  Cardiac Electrophysiology / Cardiology    [address] [place] [address] [address] [facility], [place] [address],[address]  [place], [facility], [facility]  [phone] / [phone] Fax   [phone] / [phone] Fax

## 2020-11-17 NOTE — PROCEDURES
Successful placement of a leadless pacemaker under general anesthesia. AV node ablation successfully performed Figure 8 stitch placed at venous access site Manual pressure applied for hemostasis No complications; patient remained stable. Monitor overnight for VT Possible discharge tomorrow.   
 
 
Demarco June MD

## 2020-11-17 NOTE — ANESTHESIA POSTPROCEDURE EVALUATION
Procedure(s): 
INSERT OR REPLACE TRANSCATH PPM LEADLESS ABLATION AV NODE. 
 
general 
 
Anesthesia Post Evaluation Patient location during evaluation: PACU Patient participation: complete - patient participated Level of consciousness: awake Pain management: adequate Airway patency: patent Anesthetic complications: no 
Cardiovascular status: acceptable Respiratory status: acceptable Hydration status: acceptable Comments: Seen, no complaints Post anesthesia nausea and vomiting:  none Final Post Anesthesia Temperature Assessment:  Normothermia (36.0-37.5 degrees C) INITIAL Post-op Vital signs:  
Vitals Value Taken Time /84 11/17/2020  4:15 PM  
Temp 36.4 °C (97.5 °F) 11/17/2020  4:07 PM  
Pulse 90 11/17/2020  4:17 PM  
Resp 13 11/17/2020  4:17 PM  
SpO2 99 % 11/17/2020  4:17 PM  
Vitals shown include unvalidated device data.

## 2020-11-17 NOTE — H&P
HISTORY OF PRESENTING ILLNESS Brigid Damian is a 60 y.o. female with hx of hypertension, AF s/p ablation and dual lead extraction and device removal of a left sided pacemaker s/p sepsis/foot infection on 4/27/2020. She has permanent AF with tachycardia, currently maintained on BB therapy for rate control. She's been intolerant of CCB d/t edema. She has experienced significant fatigue/SOB since her extraction. She is eager to proceed with reimplantation of a pacemaker.  
  
  
 PAST MEDICAL HISTORY  
  
    
Past Medical History:  
Diagnosis Date  Acquired lymphedema    
  Right arm  Arrhythmia    
 Asthma    
 Atrial fibrillation [facility]    
  Dr. Ariela Mariscal and Dr. Clifton Bustillo Atrial flutter [facility])    
 Cancer [facility])    
  bilateral breast  
 Chronic kidney disease    
 Diabetes mellitus type II    
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    
 Essential hypertension    
 Hyperlipidemia    
 Hypertension    
 Long term (current) use of anticoagulants    
 Morbid obesity ([address] 3/14/2012  Nausea & vomiting    
 Neuropathy    
 Osteoarthritis    
 Pacemaker    
 Sick sinus syndrome [facility])    
 Type 2 diabetes mellitus with left diabetic foot infection ([address] 10/29/2019  
  
  
  
 PAST SURGICAL HISTORY  
  
     
Past Surgical History:  
Procedure Laterality Date  ABDOMEN SURGERY PROC UNLISTED      
  gastric bypass  GASTRIC BYPASS,OBESE<150CM SAW-EN-Y   7/08  
  [facility]  HX AFIB ABLATION      
 HX AMPUTATION FOOT Left 04/2020  HX BREAST RECONSTRUCTION Bilateral    
 HX CARPAL TUNNEL RELEASE      
 HX CERVICAL FUSION   1994 [address]    1992  HX MASTECTOMY   1998  
  RIGHT MODIFIED RADICAL   
 HX MASTECTOMY Left    
  no lymphnode removal  
 HX MOHS PROCEDURES   1995  
  right  HX ORTHOPAEDIC Right    
  knee surgery  HX PACEMAKER      
 HX PACEMAKER      
 IR INSERT TUNL CVC W PORT OVER 5 YEARS      
 IR INSERT TUNL CVC W/O PORT OVER 5 YR   5/4/2020  IR INSERT TUNL CVC W/O PORT OVER 5 YR   9/8/2020  IR REMOVE TUNL CVAD W/O PORT / PUMP   6/26/2020  IR REMOVE TUNL CVAD W/O PORT / PUMP   10/19/2020  LAMINECTOMY,CERVICAL   1994  
 NEEDLE BIOPSY LIVER,W OTHR [address]   8.21.07  
 [place] [address] AND 1994  [place] RELOCATION OF SKIN POCKET FOR PACEMAKER N/A 4/24/2020  
  RELOCATE PACEMAKER POCKET performed by David Singleton MD at [address] [facility] CATH LAB  [place] RMVL TRANSVNS PM ELTRD 1 LEAD SYS ATR/VENTR N/A 4/24/2020  
  Remove Pacemaker Dual Leads performed by David Singleton MD at [facility] CATH LAB  [place] RMVL TRANSVNS PM ELTRD 1 LEAD SYS ATR/VENTR N/A 4/27/2020  
  REMOVE PACEMAKER DUAL LEADS performed by David Singleton MD at [facility] C[facility] CATH LAB  [place] TRABECULOPLASTY BY LASER SURGERY      
 US GUIDE ASP OVARIAN CYST ABD APPROACH   8.21.07  
  RIGHT   
  
  
  
 ALLERGIES  
  
     
Allergies Allergen Reactions  Ace Inhibitors Cough  Amlodipine Swelling  Avapro [Irbesartan] Unable to Obtain  Bactrim [Sulfamethoxazole-Trimethoprim] Other (comments)  
    Sore mouth  Captopril Cough  Carvedilol Diarrhea  Contrast Agent [Iodine] Itching [address]  Morphine Nausea and Vomiting and Swelling  Penicillins Hives  
    Did not tolerate cefepime 3/2020  Pravachol [Pravastatin] Nausea Only  Seafood [Shellfish Containing Products] Hives  Singulair [Montelukast] Other (comments)  
    palpitations  
  
  
  
FAMILY HISTORY  
  
     
Family History Problem Relation Age of Onset  Cancer Mother    
 Heart Disease Mother    
 Alcohol abuse Father    
 Diabetes Brother    
 Hypertension Brother    
 negative for cardiac disease 
  
  
 SOCIAL HISTORY  
  
Social History  
  
  
     
 Socioeconomic History  Marital status:   
    Spouse name: Not on file  Number of children: Not on file  Years of education: Not on file  Highest education level: Not on file Tobacco Use  Smoking status: Never Smoker  Smokeless tobacco: Never Used Substance and Sexual Activity  Alcohol use: Not Currently  Drug use: No  
  
 
  
  
MEDICATIONS  
  
    
Current Outpatient Medications Medication Sig  potassium chloride SR (Klor-Con 10) 10 mEq tablet Take  by mouth.  metoprolol tartrate (LOPRESSOR) 25 mg tablet Take 1 Tab by mouth two (2) times a day.  sertraline (ZOLOFT) 25 mg tablet Take 25 mg by mouth daily. Take with 50 mg to total 75 mg  bumetanide (BUMEX) 1 mg tablet Take 0.5 mg by mouth two (2) times a day.  insulin glargine (Lantus Solostar U-100 Insulin) 100 unit/mL (3 mL) inpn 10 Units by SubCUTAneous route Daily (before breakfast).  sertraline (Zoloft) 50 mg tablet Take 75 mg by mouth daily.  warfarin (COUMADIN) 2 mg tablet Take 1 tablet po daily (Patient taking differently: Take 2 mg by mouth daily. 2 mg M-W-FR and 1 all other days usually managed by PCP)  hydrALAZINE (APRESOLINE) 100 mg tablet Take 1 Tab by mouth three (3) times daily.  busPIRone (BUSPAR) 10 mg tablet TAKE ONE TABLET BY MOUTH TWICE A DAY  risperidone (RISPERDAL PO) Take  by mouth.  
  
No current facility-administered medications for this visit.   
  
  
I have reviewed the nurses notes, vitals, problem list, allergy list, medical history, family, social history and medications. 
  
  
 REVIEW OF SYMPTOMS General: Pt denies excessive weight gain or loss. Pt is able to conduct ADL's HEENT: Denies blurred vision, headaches, hearing loss, epistaxis and difficulty swallowing. Respiratory: Denies cough, congestion, shortness of breath, CHOW, wheezing or stridor. Cardiovascular: Denies precordial pain, palpitations, edema or PND Gastrointestinal: Denies poor appetite, indigestion, abdominal pain or blood in stool Genitourinary: Denies hematuria, dysuria, increased urinary frequency Musculoskeletal: Denies joint pain or swelling from muscles or joints Neurologic: Denies tremor, paresthesias, headache, or sensory motor disturbance Psychiatric: Denies confusion, insomnia, depression Integumentray: Denies rash, itching or ulcers. Hematologic: Denies easy bruising, bleeding 
  
  
 PHYSICAL EXAMINATION Vitals: see vitals section General: Well developed, in no acute distress. HEENT: No jaundice, oral mucosa moist, no oral ulcers Neck: Supple, no stiffness, no lymphadenopathy, supple Heart:  irreg irreg, no murmur, gallop or rub, no jugular venous distention Respiratory: Clear bilaterally x 4, no wheezing or rales Abdomen:   Soft, non-tender, bowel sounds are active.  
Extremities:  No edema, normal cap refill, no cyanosis. Musculoskeletal: No clubbing, no deformities Neuro: A&Ox3, speech clear, gait stable, cooperative, no focal neurologic deficits Skin: Skin color is normal. No rashes or lesions. Non diaphoretic, moist. 
Vascular: 2+ pulses symmetric in all extremities 
  
  
 DIAGNOSTIC DATA EKG:  
  
  
 LABORATORY DATA Lab Results Component Value Date/Time  
  WBC 8.6 09/26/2020 11:57 AM  
  Hemoglobin (POC) 13.3 09/09/2011 08:34 AM  
  HGB 8.5 (L) 09/26/2020 11:57 AM  
  Hematocrit (POC) 39 09/09/2011 08:34 AM  
  HCT 28.1 (L) 09/26/2020 11:57 AM  
  PLATELET 657 49/09/1720 11:57 AM  
  MCV 93.0 09/26/2020 11:57 AM  
  
     
Lab Results Component Value Date/Time  
  Sodium 142 09/26/2020 11:57 AM  
  Potassium 3.2 (L) 09/26/2020 11:57 AM  
  Chloride 110 (H) 09/26/2020 11:57 AM  
  CO2 24 09/26/2020 11:57 AM  
  Anion gap 8 09/26/2020 11:57 AM  
  Glucose 116 (H) 09/26/2020 11:57 AM  
  BUN 21 (H) 09/26/2020 11:57 AM  
  Creatinine 2.15 (H) 09/26/2020 11:57 AM  
   BUN/Creatinine ratio 10 (L) 09/26/2020 11:57 AM  
  GFR est AA 28 (L) 09/26/2020 11:57 AM  
  GFR est non-AA 23 (L) 09/26/2020 11:57 AM  
  Calcium 8.5 09/26/2020 11:57 AM  
  Bilirubin, total 0.4 09/26/2020 11:57 AM  
  Alk. phosphatase 87 09/26/2020 11:57 AM  
  Protein, total 6.9 09/26/2020 11:57 AM  
  Albumin 2.9 (L) 09/26/2020 11:57 AM  
  Globulin 4.0 09/26/2020 11:57 AM  
  A-G Ratio 0.7 (L) 09/26/2020 11:57 AM  
  ALT (SGPT) 12 09/26/2020 11:57 AM  
  
  
  
 ASSESSMENT 1. Atrial fibrillation Permanent BB therapy 2. S/p PPM extraction and device removal d/t bacteremia 3. Hypertension 
  
  
 PLAN  
  
Leadless PPM and AV rolando ablation concomitantly with general anesthesia.  Utilize right groin for leadless PPM and left groin for AV node ablation.  
  
  
  
Myrna Franco MD 
Cardiac Electrophysiology / Cardiology

## 2020-11-17 NOTE — PROGRESS NOTES
Day #1 of Vancomycin Indication:  Surgical ppx Current regimen: 1500 mg q12h x 2 doses Abx regimen: Monotherapy No results for input(s): WBC, CREA, BUN in the last 72 hours. Est CrCl: 40 ml/min; Temp (24hrs), Av.8 °F (36.6 °C), Min:97.5 °F (36.4 °C), Max:98 °F (36.7 °C) Cultures: none Plan: Change to 1500 mg  x1 dose only due to current renal fxn. Next dose due 02020 Serenity Sifuentes, PharmD

## 2020-11-17 NOTE — PROGRESS NOTES
TRANSFER - IN REPORT: 
 
Verbal report received from Daphne Samaniego RN (name) on Radha Cohn  being received from Cath lab (unit) for routine progression of care Report consisted of patients Situation, Background, Assessment and  
Recommendations(SBAR). Information from the following report(s) SBAR was reviewed with the receiving nurse. Opportunity for questions and clarification was provided. Assessment completed upon patients arrival to unit and care assumed.

## 2020-11-17 NOTE — PROGRESS NOTES
Cardiac Cath Lab Recovery Arrival Note: 
 
 
Flip Hinkle arrived to Cardiac Cath Lab, Recovery Area. Staff introduced to patient. Patient identifiers verified with NAME and DATE OF BIRTH. Procedure verified with patient. Consent forms reviewed and signed by patient or authorized representative and verified. Allergies verified. Patient and family oriented to department. Patient and family informed of procedure and plan of care. Questions answered with review. Patient prepped for procedure, per orders from physician, prior to arrival. 
 
Patient on cardiac monitor, non-invasive blood pressure, SPO2 monitor. On RA. Patient is A&Ox 4. Patient reports no pain. Patient in stretcher, in low position, with side rails up, call bell within reach, patient instructed to call if assistance as needed. Patient prep in: [address], [place]. Patient family has pager # Family in: . Prep by: ELOY Barnes RN

## 2020-11-17 NOTE — ANESTHESIA PREPROCEDURE EVALUATION
Relevant Problems No relevant active problems Anesthetic History PONV Review of Systems / Medical History Patient summary reviewed, nursing notes reviewed and pertinent labs reviewed Pulmonary Asthma Neuro/Psych Within defined limits Cardiovascular Hypertension Dysrhythmias : atrial fibrillation and atrial flutter Pacemaker GI/Hepatic/Renal 
  
 
 
Renal disease: CRI Endo/Other Diabetes: type 2 Obesity, arthritis and cancer Other Findings Physical Exam 
 
Airway Mallampati: II 
TM Distance: > 6 cm Neck ROM: normal range of motion Mouth opening: Normal 
 
 Cardiovascular Regular rate and rhythm,  S1 and S2 normal,  no murmur, click, rub, or gallop Dental 
No notable dental hx Pulmonary Breath sounds clear to auscultation Abdominal 
GI exam deferred Other Findings Anesthetic Plan ASA: 3 Anesthesia type: general 
 
 
 
 
Induction: Intravenous Anesthetic plan and risks discussed with: Patient

## 2020-11-18 NOTE — PROGRESS NOTES
Problem: Falls - Risk of 
Goal: *Absence of Falls Description: Document Chace Francis Fall Risk and appropriate interventions in the flowsheet. 11/18/2020 0819 by Martin Conn Outcome: Progressing Towards Goal 
Note: Fall Risk Interventions: 
  
 
  
 
Medication Interventions: Teach patient to arise slowly, Evaluate medications/consider consulting pharmacy Elimination Interventions: Call light in reach 
 
  
 
 
11/18/2020 0818 by Martin Conn Outcome: Progressing Towards Goal 
Note: Fall Risk Interventions: 
  
 
  
 
Medication Interventions: Teach patient to arise slowly, Evaluate medications/consider consulting pharmacy Elimination Interventions: Call light in reach Problem: Patient Education: Go to Patient Education Activity Goal: Patient/Family Education Outcome: Progressing Towards Goal 
  
Problem: Risk for Spread of Infection Goal: Prevent transmission of infectious organism to others Description: Prevent the transmission of infectious organisms to other patients, staff members, and visitors. 11/18/2020 0819 by Martin Conn Outcome: Progressing Towards Goal 
11/18/2020 0818 by Martin Conn Outcome: Progressing Towards Goal 
  
Problem: Patient Education:  Go to Education Activity Goal: Patient/Family Education 11/18/2020 0819 by Martin Conn Outcome: Progressing Towards Goal 
11/18/2020 0818 by Martin Conn Outcome: Progressing Towards Goal

## 2020-11-18 NOTE — DISCHARGE SUMMARY
Cardiology Discharge Summary Patient ID: 
Garrick Ashford [id number] 
37 y.o. 
1960 Admit Date: 11/17/2020 Discharge Date: 11/18/20 Admitting Physician: Jamey Malone MD  
 
Discharge Physician: same Admission Diagnoses:  
Sick sinus syndrome ([facility] [I49.5] S/P atrioventricular rolando ablation [Z98.890] Discharge Diagnoses: Active Problems: S/P atrioventricular rolando ablation (11/17/2020) Discharge Condition: Good Cardiology Procedures this Admission:  AVN ablation with LL PPM insertion Consults: None Hospital Course:  
Successful placement of a leadless pacemaker under general anesthesia. AV node ablation successfully performed Figure 8 stitch placed at venous access site Monitored overnight on telemetry for VT - none on telemetry review Right groin was soft, NTTP. Single prolene figure 8 stitch removed without difficulty. Dressing applied Stable for discharge. Visit Vitals BP (!) 110/55 (BP Patient Position: At rest) Pulse 90 Temp 98.2 °F (36.8 °C) Resp 16 Ht 5' 10\" (1.778 m) Wt 246 lb 14.6 oz (112 kg) SpO2 96% Breastfeeding No  
BMI 35.43 kg/m² Physical Exam 
Abdomen: soft, non-tender. Bowel sounds normal. 
Extremities: no LE edema, + PP bilaterally Heart: regular rate and rhythm, S1, S2 normal, no murmurs, clicks, rubs or gallops Lungs: clear to auscultation bilaterally Neck: supple, symmetrical, trachea midline, Neurologic: Grossly normal 
Pulses: 2+ and symmetrical 
 
Labs: No results for input(s): WBC, HGB, HCT, PLT, HGBEXT, HCTEXT, PLTEXT in the last 72 hours. No results for input(s): NA, K, CL, CO2, GLU, BUN, CREA, CA, MG, PHOS, ALB, TBIL, ALT, INR, INREXT in the last 72 hours. No lab exists for component: SGOT No results for input(s): TROIQ, CPK, CKMB in the last 72 hours. Disposition: home Patient Instructions:  
Current Discharge Medication List  
  
CONTINUE these medications which have NOT CHANGED Details  
!! sertraline (ZOLOFT) 100 mg tablet   
  
warfarin (Coumadin) 1 mg tablet 2 mg M-W-FR and 1 all other days  
  
potassium chloride (K-DUR, KLOR-CON) 20 mEq tablet Take 1 Tab by mouth daily. Qty: 30 Tab, Refills: 5 Associated Diagnoses: Hypokalemia  
  
ondansetron (Zofran ODT) 4 mg disintegrating tablet Take 1 Tab by mouth every eight (8) hours as needed for Nausea. Qty: 10 Tab, Refills: 0  
  
metoprolol tartrate (LOPRESSOR) 25 mg tablet Take 1 Tab by mouth two (2) times a day. Qty: 60 Tab, Refills: 3  
  
!! sertraline (ZOLOFT) 25 mg tablet Take 25 mg by mouth daily. Take with 100 mg tablet every morning  
  
bumetanide (BUMEX) 1 mg tablet Take 0.5 mg by mouth two (2) times a day. insulin glargine (Lantus Solostar U-100 Insulin) 100 unit/mL (3 mL) inpn 10 Units by SubCUTAneous route Daily (before breakfast). hydrALAZINE (APRESOLINE) 100 mg tablet Take 1 Tab by mouth three (3) times daily. Qty: 90 Tab, Refills: 11  
 Associated Diagnoses: Essential hypertension  
  
busPIRone (BUSPAR) 10 mg tablet TAKE ONE TABLET BY MOUTH TWICE A DAY Qty: 60 Tab, Refills: 10  
 Associated Diagnoses: Anxiety as acute reaction to gross stress  
  
 !! - Potential duplicate medications found. Please discuss with provider. Reference discharge instructions provided by nursing for diet and activity. Follow-up with Future Appointments Date Time Provider Gómez Lechuga 1/6/2021  1:00 PM Mike Alves MD [facility] BS AMB Signed: 
Drake Stearns PA-C

## 2020-11-18 NOTE — PROGRESS NOTES
I have reviewed discharge instructions with the patient. The patient verbalized understanding. Ariela Gleason does not work.

## 2020-11-18 NOTE — PROGRESS NOTES
Problem: Falls - Risk of 
Goal: *Absence of Falls Description: Document Mario Alberto España Fall Risk and appropriate interventions in the flowsheet. Outcome: Progressing Towards Goal 
Note: Fall Risk Interventions: 
  
 
  
 
Medication Interventions: Patient to call before getting OOB Elimination Interventions: Call light in reach Problem: Patient Education: Go to Patient Education Activity Goal: Patient/Family Education Outcome: Progressing Towards Goal 
  
Problem: Risk for Spread of Infection Goal: Prevent transmission of infectious organism to others Description: Prevent the transmission of infectious organisms to other patients, staff members, and visitors. Outcome: Progressing Towards Goal 
  
Problem: Patient Education:  Go to Education Activity Goal: Patient/Family Education Outcome: Progressing Towards Goal

## 2020-11-18 NOTE — PROGRESS NOTES
Problem: Falls - Risk of 
Goal: *Absence of Falls Description: Document Surendra Going Fall Risk and appropriate interventions in the flowsheet. Outcome: Progressing Towards Goal 
Note: Fall Risk Interventions: 
Mobility Interventions: Bed/chair exit alarm, Communicate number of staff needed for ambulation/transfer Medication Interventions: Patient to call before getting OOB, Teach patient to arise slowly Elimination Interventions: Call light in reach, Bed/chair exit alarm, Toilet paper/wipes in reach

## 2020-11-18 NOTE — PROGRESS NOTES
Bedside and Verbal shift change report given to Mary Enciso (oncoming nurse) by Christopher Friend RN (offgoing nurse). Report included the following information SBAR, Kardex, ED Summary, Procedure Summary, Intake/Output, MAR, Recent Results and Cardiac Rhythm Paced.

## 2020-11-18 NOTE — PROGRESS NOTES
TRANSITIONS OF CARE PLAN:  
1. DESTINATION: Own Home 2. TRANSPORT: Spouse 3. ADDITIONAL SUPPORT: Spouse 4. DME: prosthetic leg, walker, cane, raised toilet seat, bedside commode, shower stool, glucometer, bp cuff, full dentures 5. HOME HEALTH: No plan 6. CODE STATUS/AMD STATUS: PRIOR; on file 7. FOLLOW UP APPOINTMENTS: PCP, Cardio 8. STILL NEEDS: Discharge Reason for Admission:   Sick Sinus Syndrome RUR Score:     N/A; RRAT: 37 PCP: First and Last name: Dr. Cleveland Franklin Name of Practice:  
 Are you a current patient: Yes/No: yes Approximate date of last visit: 11/13 Can you do a virtual visit with your PCP:  
          
Resources/supports as identified by patient/family: Multiple pieces of DME, good support from family Top Challenges facing patient (as identified by patient/family and CM): Finances/Medication cost?     Has BCBS PPO - uses [facility] in [facility] Transportation? Spouse at discharge; self at baseline Support system or lack thereof? Spouse Living arrangements? With spouse in a single story home Self-care/ADLs/Cognition? Alert and Oriented x 4; independent Current Advanced Directive/Advance Care Plan:  PRIOR; on file Plan for utilizing home health:    No plan Transition of Care Plan:    CM met with patient, with patient alert and oriented x4. Patient has hx of rehab at [facility], [facility] with [facility] that is unknown, and pharmacy preference is [facility] in [facility]. Patient lives with spouse in a single story home and is independent in ADLs to include driving. Disposition is for discharge to own home with transport via spouse. Observation notice provided in writing to patient and/or caregiver as well as verbal explanation of the policy. Patients who are in outpatient status also receive the Observation notice. Care Management Interventions PCP Verified by CM: Yes(followed by Dr. Sahil Douglass) Last Visit to PCP: 11/13/20 Mode of Transport at Discharge: Other (see comment)(spouse to transport) Transition of Care Consult (CM Consult): Discharge Planning MyChart Signup: Yes Discharge Durable Medical Equipment: No(has: prosthetic leg, walker, cane, raised toilet seat, bedside commode, shower stool, glucometer, bp cuff, full dentures) Health Maintenance Reviewed: Yes(cm met with patient, with patient alert and oriented x 4) Physical Therapy Consult: No 
Occupational Therapy Consult: No 
Speech Therapy Consult: No 
Current Support Network: Own Home, Lives with Spouse Confirm Follow Up Transport: Self(independent in adls, to include driviing) [employer] Information Provided?: No 
Discharge Location Discharge Placement: Home with family assistance(lives with spouse in a single story home, no exterior steps) CRM: Renny Dang, MPH, [address] [place] [address]; Z: [phone]

## 2020-11-18 NOTE — PROGRESS NOTES
Primary Nurse Erica Martínez RN and GURINDER Grissom performed a dual skin assessment on this patient Impairment noted- see wound doc flow sheet. Pt has an ulcer with skin break down on right ball of foot the size of a dime. Did not inspect pt's back side because Pt was actively bleeding when arrived at the floor. Pt needed to lay flat. Make on- coming nurse aware. Bedside and Verbal shift change report given to Brenden Hanson RN  (oncoming nurse) by Lynn Diez RN  (offgoing nurse). Report included the following information SBAR.

## 2020-11-23 PROBLEM — R07.9 CHEST PAIN: Status: ACTIVE | Noted: 2020-01-01

## 2020-11-23 NOTE — ED TRIAGE NOTES
Arrives in wheelchair with  for c/o \"not feeling well\" for 2 weeks and poor appetite x 2-3 days. +pale in triage. Recently seen at [address] [place] [address] for Right groin puncture site drainage on Friday. Pacemaker placed by Dr. Skinny Decker for SSS. Currently on coumadin, last dose last week. +SOB. +Chest pain. Denies fevers.

## 2020-11-23 NOTE — PATIENT INSTRUCTIONS
Go to [facility] Emergency Room Rule out anemia, PE, cardiac complication, or infection.

## 2020-11-23 NOTE — PROGRESS NOTES
CC:  
Chief Complaint Patient presents with  Breathing Problem  Vomiting Cannot keep food down.  Other Leg Leaking HISTORY OF PRESENT ILLNESS Spencer Singer is a 63 y.o. female. Presents for evaluation of SOB, vomiting, and leg leaking. She has type 2 DM with CKD stage 4 and polyneuropathy, HTN, hyperlipidemia, paroxysmal atrial fibrillation on chronic warfarin, hx of ablation of a-fib, cardiac pacemaker for SSS, hx of breast cancer s/p bilateral mastectomy, left BKA 5/2020, and morbid obesity. Hospitalized at 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 Later admitted to [facility] from 8/20-9/11/20 for septic arthritis of the right sternal clavicular joint. Treated with IV vancomycin for 6 weeks (ended on 10/5/20) and had PICC line removed on 10/19/20. Today she complains of 10/10 right-sided chest pain, SOB, vomiting, and clear-colored leaking at fright femoral artery catheter site that started about 5-6 days ago since undergoing insertion of permanent pacemaker on 11/17/20. Denies fevers or chills. Reports she was seen at [facility] ED on 11/19/20 (records not available) and discharged home. Patient Active Problem List  
Diagnosis Code  History of breast cancer Z85.3  Asthma J45.909  Fe deficiency anemia D50.9  Status post ablation of atrial fibrillation Z98.890, Z86.79  
 Sick sinus syndrome (HCC) I49.5  S/P cardiac pacemaker procedure Z95.0  Long term (current) use of anticoagulants Z79.01  
 Mixed hyperlipidemia E78.2  CKD (chronic kidney disease), stage IV (HCC) N18.4  Systolic murmur W53.5  Essential hypertension I10  
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  
 H/O bilateral mastectomy Z90.13  Left below-knee amputee [facility]) S65.399  S/P atrioventricular rolando ablation Z98.890 Past Medical History:  
Diagnosis Date  Acquired lymphedema Right arm  Arrhythmia  Asthma  Atrial fibrillation ([facility])  Cancer ([facility] bilateral breast  
 Chronic kidney disease  Diabetes mellitus type II   
 Diabetic ulcer of left heel associated with type 2 diabetes mellitus, with fat layer exposed ([facility] 6/21/2019  Diabetic ulcer of left midfoot with necrosis of muscle ([facility] 3/3/2020  Dizziness  Essential hypertension  Hyperlipidemia  Hypertension  Long term (current) use of anticoagulants  Morbid obesity ([address] 3/14/2012  Nausea & vomiting  Neuropathy  Osteoarthritis  Pacemaker  Sick sinus syndrome ([address]  Type 2 diabetes mellitus with left diabetic foot infection ([facility] 10/29/2019 Allergies Allergen Reactions  Ace Inhibitors Cough  Amlodipine Swelling  Avapro [Irbesartan] Unable to Obtain  Bactrim [Sulfamethoxazole-Trimethoprim] Other (comments) Sore mouth  Captopril Cough  Carvedilol Diarrhea  Contrast Agent [Iodine] Itching [address]  Morphine Nausea and Vomiting and Swelling  Penicillins Hives Did not tolerate cefepime 3/2020  Pravachol [Pravastatin] Nausea Only  Seafood [Shellfish Containing Products] Hives  Singulair [Montelukast] Other (comments)  
  palpitations Current Outpatient Medications Medication Sig Dispense Refill  sertraline (ZOLOFT) 100 mg tablet  warfarin (Coumadin) 1 mg tablet 2 mg M-W-FR and 1 all other days  ondansetron (Zofran ODT) 4 mg disintegrating tablet Take 1 Tab by mouth every eight (8) hours as needed for Nausea. 10 Tab 0  
 metoprolol tartrate (LOPRESSOR) 25 mg tablet Take 1 Tab by mouth two (2) times a day. 60 Tab 3  
 sertraline (ZOLOFT) 25 mg tablet Take 25 mg by mouth daily. Take with 100 mg tablet every morning  bumetanide (BUMEX) 1 mg tablet Take 0.5 mg by mouth two (2) times a day.  insulin glargine (Lantus Solostar U-100 Insulin) 100 unit/mL (3 mL) inpn 10 Units by SubCUTAneous route Daily (before breakfast).  hydrALAZINE (APRESOLINE) 100 mg tablet Take 1 Tab by mouth three (3) times daily. 90 Tab 11  
 busPIRone (BUSPAR) 10 mg tablet TAKE ONE TABLET BY MOUTH TWICE A DAY 60 Tab 10 PHYSICAL EXAM 
Visit Vitals BP (!) 164/97 (BP 1 Location: Left arm, BP Patient Position: Sitting) Pulse 90 Temp 97.9 °F (36.6 °C) (Oral) Resp 20 Ht 5' 10\" (1.778 m) BMI 35.43 kg/m² General: Obese, rapid respiratory rate, ill-appearing. Looks pale. In wheelchair. HEENT:  Head normocephalic/atraumatic, no scleral icterus Lungs:  Clear to ausculation bilaterally. Good air movement. Heart:  Regular rate and rhythm, normal S1 and S2, no murmur, gallop, or rub Extremities: No clubbing, cyanosis, or edema. Clear fluid stain on right side of pants at hip area. Neurological: Alert and oriented x 3. Psychiatric: Normal mood and affect.  Behavior is normal.  
 
 
 ASSESSMENT AND PLAN 
  ICD-10-CM ICD-9-CM 1. Chest pain, unspecified type  R07.9 786.50   
2. Shortness of breath  R06.02 786.05   
3. Intractable vomiting with nausea, unspecified vomiting type  R11.2 536.2 4. Complication of intravenous catheter site  T80.90XA 999.88 Diagnoses and all orders for this visit: 
 
1. Chest pain, unspecified type Rule out anemia, PE, cardiac complication, or infection. 2. Shortness of breath 3. Intractable vomiting with nausea, unspecified vomiting type 4. Complication of intravenous catheter site Patient sen to [facility] Emergency Room for further evaluation. I spoke with the nurse manager about the reason I was sending patient to the ED. Follow-up and Dispositions · Return if symptoms worsen or fail to improve. I have discussed the diagnosis with the patient and the intended plan as seen in the above orders. Patient is in agreement. The patient has received an after-visit summary and questions were answered concerning future plans. I have discussed medication side effects and warnings with the patient as well.

## 2020-11-23 NOTE — PROGRESS NOTES
Jessica Nelson is a 63 y.o. female Chief Complaint Patient presents with  Breathing Problem  Vomiting Cannot keep food down.  Other Leg Leaking Health Maintenance Due Topic Date Due  Shingrix Vaccine Age 50> (1 of 2) 11/11/2009  Colorectal Cancer Screening Combo  04/21/2020  MICROALBUMIN Q1  11/13/2020  Lipid Screen  11/11/2020 Visit Vitals BP (!) 164/97 (BP 1 Location: Left arm, BP Patient Position: Sitting) Pulse 90 Temp 97.9 °F (36.6 °C) (Oral) Resp 20 Ht 5' 10\" (1.778 m) Wt 262 lb (118.8 kg) BMI 37.59 kg/m² 1. Have you been to the ER, urgent care clinic since your last visit? Hospitalized since your last visit? Yes, [facility] because her right leg busted open and it started bleeding. 2. Have you seen or consulted any other health care providers outside of the [address] [place] [address] since your last visit? Include any pap smears or colon screening.  No

## 2020-11-24 NOTE — PROGRESS NOTES
Day #1 of Vancomycin Indication:  surgical site infection, ?suspected bacteremia - R clavicular fluid collection s/p clavicular resection/surgery following infection from a vegetation of the pacemake lead - s/p L foot amputation 
- bilateral pleural effusions Current regimen:  vancomycin 2g loading dose Abx regimen:  vanc monotherapy ID Following ?: NO - has followed in previous admission 2/2 allergies Concomitant nephrotoxic drugs (requires more frequent monitoring): None Frequency of BMP?: daily Recent Labs  
  20 
0454 20 
1426 WBC 6.0 6.5 CREA 1.93* 2.17* BUN * 23* Est CrCl: 40-45 ml/min; UO: -- ml/kg/hr Temp (24hrs), Av.6 °F (36.4 °C), Min:96.9 °F (36.1 °C), Max:98 °F (36.7 °C) Cultures:  
 urine: in process (of note, no pyuria, few bacteria on UA) 
 blood: NG x1 day; prelim  SARS-COV-2, PCR: in process 2020 sternoclavicular tissue: MSSA; final 
 
Goal trough = 15 - 20 mcg/mL Recent trough history (date/time/level/dose/action taken): 
N/a Plan: Start vanc 1.25g U92T. Scr appears similar to trend from earlier this month and late September; will monitor. Thoracics consulted for fluid collection around clavicle resection site. Will follow cultures from any I&D. Pharmacy will continue to follow daily and assess dosing once at steady state. Greer Dobson, [address]

## 2020-11-24 NOTE — ED NOTES
Pt A&O x4 with non-labored respirations and HOB elevated to comfort. Pt awaiting admission and COVID test. Lab was called for COVID swab.

## 2020-11-24 NOTE — ED NOTES
Abnormal Troponin level of \"0.12\" called to ED by Lab [place]), and Dr. Karis Martínez notified. Repeat EKG was completed at the time of repeat Troponin.

## 2020-11-24 NOTE — ACP (ADVANCE CARE PLANNING)
Advance Care Planning Advance Care Planning Activator (Inpatient) Conversation Note Date of ACP Conversation: 11/24/20 Conversation Conducted with:   Patient with Decision Making Capacity ACP Activator: Yolande Martinez Health Care Decision Maker: 
 
Current Designated Health Care Decision Maker:   Primary Decision Maker: Blanca Cartagena - Spouse - [phone] Current Advanced Directive/Advance Care Plan:  Full code with completed ACP documentation on file. Identified health care decision maker is patient's spouse, Scout Blanchard [phone]. NINFA Salinas/QUE Care Management 10:38 AM

## 2020-11-24 NOTE — PROGRESS NOTES
Transition of Care Plan: 1. TBD/subject to change pending recommendations. 
 -Anticipate home with family assistance. -Thoracic Surgery and Cardiology Consulted 2. Family to transport CM will continue to follow and assist with ETE needs as they arise. Reason for Admission:   Chest Pain RUR Score:     37 PCP: YES First and Last name: Christiano Padgett .[phone] Name of Practice: [address] [place] [address] Internal Medicine [facility] [place] Are you a current patient: Yes/No: YES Approximate date of last visit: 11/23/20 Can you do a virtual visit with your PCP:  Paola Houston Resources/supports as identified by patient/family:   Family Top Challenges facing patient (as identified by patient/family and CM):  Current health challenges and concerns Finances/Medication cost?     [employer] as source of income. No significant financial stressors or concerns. Insurance verified: [employer]. [employer] is utilized for prescriptions. Transportation? Family Support system or lack thereof? Spouse: Candance Decree [phone] Living arrangements? Resides with spouse in their own 1 story home with ramp to enter. Self-care/ADLs/Cognition? AOx3, independent with ADL's and IADL\"s. DME: prosthetic leg, walker, wheel chair, cane, raised toilet seat, bedside commode, shower stool, glucometer, bp cuff, and full dentures. Current Advanced Directive/Advance Care Plan:  Full code with completed ACP documentation on file. Identified health care decision maker is patient's spouse, Candance Decree [phone]. Plan for utilizing home health:    Not at this time. TBD/subject to change pending recommendations. Care Management Interventions PCP Verified by CM: Yes Last Visit to PCP: 11/23/20 Palliative Care Criteria Met (RRAT>21 & CHF Dx)?: No 
 Mode of Transport at Discharge: Other (see comment)(Family) Discharge Durable Medical Equipment: No 
Physical Therapy Consult: No 
Occupational Therapy Consult: No 
Speech Therapy Consult: No 
Current Support Network: Lives with Spouse, Own Home Confirm Follow Up Transport: Family Discharge Location Discharge Placement: Home with family assistance(TBD/subject to change pending recommendations) NINFA Smith/QUE Care Management 10:37 AM

## 2020-11-24 NOTE — ED NOTES
Pharmacy called to send Levaquin because still unable to pull from Pyxis x4 attempts. Cultures now drawn and 2nd IV placed (pt is hard stick). Jean-Pierre Phillips from Pharmacy states she will send to ED.

## 2020-11-24 NOTE — ED NOTES
Pt states she is a hard stick and can only use one extremity. Pt tolerated 2nd IV line 22G to LFA. Blood cultures drawn x2 and rainbow drawn and sent. Still awaiting COVID swab from lab and bed assignment.

## 2020-11-24 NOTE — PROGRESS NOTES
TRANSFER - OUT REPORT: 
 
Verbal report given to Moshe Son (name) on Odette Abrams  being transferred to [facility] (unit) for routine progression of care Report consisted of patients Situation, Background, Assessment and  
Recommendations(SBAR). Information from the following report(s) SBAR, Kardex, ED Summary, Intake/Output, MAR, Recent Results and Cardiac Rhythm NSR was reviewed with the receiving nurse. Lines:  
Venous Access Device Proline CT with cuff 09/08/20 (Active) Peripheral IV 11/23/20 Left Wrist (Active) Site Assessment Clean, dry, & intact 11/24/20 1621 Phlebitis Assessment 0 11/24/20 0836 Infiltration Assessment 0 11/24/20 0836 Dressing Status Clean, dry, & intact 11/24/20 8350 Dressing Type Transparent 11/24/20 0836 Hub Color/Line Status Infusing 11/24/20 0836 Peripheral IV 11/23/20 Left Forearm (Active) Site Assessment Clean, dry, & intact 11/24/20 1621 Phlebitis Assessment 0 11/24/20 0836 Infiltration Assessment 0 11/24/20 0836 Dressing Status Clean, dry, & intact 11/24/20 0231 Dressing Type Transparent 11/24/20 0836 Hub Color/Line Status Capped 11/24/20 7267 Opportunity for questions and clarification was provided. Patient transported with: 
 Monitor Tech

## 2020-11-24 NOTE — PROGRESS NOTES
Admission Medication Reconciliation: 
 
Information obtained from:  chart review, insurance claim information RxQuery data available¹:  YES Comments/Recommendations: All medications  have been reviewed and updated. Attempted to speak with patient by telephone, unable to speak with patient face to face at this time due to general isolation precautions related to COVID-19 pandemic. No answer to in-patient telephone. Therefore, all medications were reviewed via insurance claim information and chart review. Patient last discharged 11/18/20. No new medications via insurance have been filled in last week. No changes to PTA medication list.  
Unable to confirm last doses of medications at this time. Changes made to Prior to Admission (PTA) Medication List: ? Medications Added:  
- None ? Medications Changed:  
- None ? Medications Removed:  
- None ¹RxQuery pharmacy benefit data reflects medications filled and processed through the patient's insurance, however  
this data does NOT capture whether the medication was picked up or is currently being taken by the patient. Allergies: Orvilla Inks; Bactrim [sulfamethoxazole-trimethoprim]; Contrast agent [iodine]; Fish containing products; Morphine; Penicillins; Pravachol [pravastatin]; Seafood [shellfish containing products]; Singulair [montelukast]; Ace inhibitors; Amlodipine; Captopril; and Carvedilol Significant PMH/Disease States:  
Past Medical History:  
Diagnosis Date Acquired lymphedema Right arm Arrhythmia Asthma Atrial fibrillation ([facility] [address] Dr. Mikael Rain and Dr. Lieberman [place]  
 Atrial flutter [facility]) Cancer ([address] bilateral breast  
 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  
Prior to Admission Medications Prescriptions Last Dose Informant Patient Reported? Taking? bumetanide (BUMEX) 1 mg tablet   Yes No  
Sig: Take 0.5 mg by mouth two (2) times a day. busPIRone (BUSPAR) 10 mg tablet  Self No No  
Sig: TAKE ONE TABLET BY MOUTH TWICE A DAY  
hydrALAZINE (APRESOLINE) 100 mg tablet   No No  
Sig: Take 1 Tab by mouth three (3) times daily. insulin glargine (Lantus Solostar U-100 Insulin) 100 unit/mL (3 mL) inpn   Yes No  
Sig: 10 Units by SubCUTAneous route Daily (before breakfast). metoprolol tartrate (LOPRESSOR) 25 mg tablet   No No  
Sig: Take 1 Tab by mouth two (2) times a day. ondansetron (Zofran ODT) 4 mg disintegrating tablet   No No  
Sig: Take 1 Tab by mouth every eight (8) hours as needed for Nausea. sertraline (ZOLOFT) 100 mg tablet   Yes No  
sertraline (ZOLOFT) 25 mg tablet   Yes No  
Sig: Take 25 mg by mouth daily. Take with 100 mg tablet every morning  
warfarin (Coumadin) 1 mg tablet   Yes No  
Si mg -- and 1 all other days Facility-Administered Medications: None Thank you for allowing pharmacy to participate in the coordination of this patient's care. If you have any other questions, please contact the medication reconciliation pharmacist at x [phone]. Cassy Redmond., BCPS

## 2020-11-24 NOTE — ED PROVIDER NOTES
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 Félix hoyt who presents emergency department with chest pain and shortness of breath that has been present and ongoing for the past 2 weeks. She states she feels as though all of her symptoms began when she had a urinary tract infection but notes she never really felt better. She states that she never really had any dysuria or odor but states that when they did some imaging they noted some swelling of the bladder and thought it was related to infection. Since that time she states that she was evaluated [place] [facility] [place] having significant bleeding from her right femoral artery access site. She states she is not really sure what they did but denies having to go to the OR or have any procedure to stop the bleeding. She states they just did some packing. She states since that time she has had some fluid leaking from the area but that is not bloody. Patient states she just feels generally unwell. Denies any known exposures to Covid but does states she has felt short of breath. Past Medical History:  
Diagnosis Date  Acquired lymphedema Right arm  Arrhythmia  Asthma  Atrial fibrillation ([facility] Dr. David Sampson and Dr. Kae alves [facility])  Cancer ([facility] bilateral breast  
 Chronic kidney disease  Diabetes mellitus type II   
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  
Procedure Laterality Date  ABDOMEN SURGERY PROC UNLISTED    
 gastric bypass  GASTRIC BYPASS,OBESE<150CM ASW-EN-Y  7/08  
 [place]  HX AFIB ABLATION    
 HX AMPUTATION FOOT Left 04/2020  HX BREAST RECONSTRUCTION Bilateral   
 HX CARPAL TUNNEL RELEASE [address] [facility] [address] [facility] RIGHT MODIFIED RADICAL   
 HX MASTECTOMY Left   
 no lymphnode removal  
 HX MOHS PROCEDURES  1995  
 right  HX ORTHOPAEDIC Right   
 knee surgery  HX PACEMAKER  11/17/2020 Dr. Marjorie Miramontes. Insertion of permanent pacemaker. AV node ablation  HX PACEMAKER    
 IR INSERT TUNL CVC W PORT OVER 5 YEARS    
 IR INSERT TUNL CVC W/O PORT OVER 5 YR  5/4/2020  IR INSERT TUNL CVC W/O PORT OVER 5 YR  9/8/2020  IR REMOVE TUNL CVAD W/O PORT / PUMP  6/26/2020  IR REMOVE TUNL CVAD W/O PORT / PUMP  10/19/2020  [address]  8.21.07  
 [address] AND 1994  CT RELOCATION OF SKIN POCKET FOR PACEMAKER N/A 4/24/2020 RELOCATE 2 [facility] performed by Gianfranco Horner MD at [address] [facility] CATH LAB  CT RMVL TRANSVNS PM ELTRD 1 LEAD SYS ATR/VENTR N/A 4/24/2020 Remove Pacemaker Dual Leads performed by Gianfranco Horner MD at [facility] CATH LAB  CT RMVL TRANSVNS PM ELTRD 1 LEAD SYS ATR/VENTR N/A 4/27/2020 REMOVE PACEMAKER DUAL LEADS performed by Gianfranco Horner MD at [address] CATH [address]  CT TRABECULOPLASTY BY LASER SURGERY    
 US GUIDE ASP OVARIAN CYST ABD APPROACH  8.21.07  
 RIGHT Family History:  
Problem Relation Age of Onset  Cancer Mother  Heart Disease Mother  Alcohol abuse Father  Diabetes Brother  Hypertension Brother Social History Socioeconomic History  Marital status:  Spouse name: Not on file  Number of children: Not on file  Years of education: Not on file  Highest education level: Not on file Occupational History  Not on file Social Needs  Financial resource strain: Not on file  Food insecurity Worry: Not on file Inability: Not on file  Transportation needs Medical: Not on file Non-medical: Not on file Tobacco Use  Smoking status: Never Smoker  Smokeless tobacco: Never Used Substance and Sexual Activity  Alcohol use: Not Currently  Drug use: No  
 Sexual activity: Not on file Lifestyle  Physical activity Days per week: Not on file Minutes per session: Not on file  Stress: Not on file Relationships  Social connections Talks on phone: Not on file Gets together: Not on file Attends Mandaeism service: Not on file Active member of club or organization: Not on file Attends meetings of clubs or organizations: Not on file Relationship status: Not on file  Intimate partner violence Fear of current or ex partner: Not on file Emotionally abused: Not on file Physically abused: Not on file Forced sexual activity: Not on file Other Topics Concern  Not on file Social History Narrative  Not on file ALLERGIES: Ace inhibitors; Amlodipine; Ceola Charla; Bactrim [sulfamethoxazole-trimethoprim]; Captopril; Carvedilol; Contrast agent [iodine]; Fish containing products; Morphine; Penicillins; Pravachol [pravastatin]; Seafood [shellfish containing products]; and Singulair [montelukast] Review of Systems Constitutional: Positive for activity change and fatigue. Negative for fever. HENT: Negative for drooling. Eyes: Negative for photophobia. Respiratory: Positive for cough and shortness of breath. Cardiovascular: Positive for chest pain and leg swelling. Gastrointestinal: Positive for diarrhea. Negative for blood in stool and vomiting. Genitourinary: Positive for pelvic pain. Negative for vaginal bleeding. Musculoskeletal: Negative for neck pain. Skin: Negative for rash. Neurological: Positive for light-headedness. Negative for seizures and syncope. Hematological: Does not bruise/bleed easily. Psychiatric/Behavioral: Negative. Vitals:  
 11/23/20 1359 11/23/20 1545 11/23/20 1600 11/23/20 1730 BP: (!) 161/90 (!) 154/83 (!) 168/95 (!) 170/96 Pulse: 91 92 90 90 Resp: 22 24 23 26 Temp: 96.9 °F (36.1 °C) SpO2: 99% 100% 100% Physical Exam 
Vitals signs and nursing note reviewed. Constitutional:   
   General: She is in acute distress. Appearance: She is obese. She is not toxic-appearing. HENT:  
   Head: Normocephalic and atraumatic. Mouth/Throat:  
   Comments: Mask in place Eyes:  
   Pupils: Pupils are equal, round, and reactive to light. Neck:  
   Trachea: No tracheal deviation. Cardiovascular:  
   Rate and Rhythm: Normal rate. Heart sounds: Murmur present. Pulmonary:  
   Effort: Pulmonary effort is normal.  
   Breath sounds: Examination of the right-lower field reveals decreased breath sounds. Examination of the left-lower field reveals decreased breath sounds. Decreased breath sounds present. Chest:  
   Chest wall: Tenderness present. Comments: Scar right upper chest consistent with removal of part of her clavicle. Tender throughout the area with some fullness noted in the right breast. 
Abdominal:  
   Palpations: Abdomen is soft. Musculoskeletal:  
   Right lower leg: Edema present. Left lower leg: Edema present. Comments: Status post left foot amputation. Skin: 
   General: Skin is warm and dry. Comments: Small incision noted to right femoral area consistent with right femoral access for pacemaker placement draining serous fluid no erythema or warmth noted Neurological:  
   Mental Status: She is alert and oriented to person, place, and time. Psychiatric:     
   Mood and Affect: Mood normal.     
   Behavior: Behavior normal.  
 
  
 
MDM Number of Diagnoses or Management Options Diagnosis management comments: Case discussed with on-call cardiologist Dr. Karen Weir who agrees with admission for further observation and trending of her troponin Amount and/or Complexity of Data Reviewed Clinical lab tests: reviewed and ordered Tests in the radiology section of CPT®: ordered and reviewed Decide to obtain previous medical records or to obtain history from someone other than the patient: yes Discuss the patient with other providers: yes Independent visualization of images, tracings, or specimens: yes Risk of Complications, Morbidity, and/or Mortality Presenting problems: high Procedures Perfect Serve Consult for Admission 7:26 PM 
 
ED Room Number: ID69/11 Patient Name and age:  Fatou Rashid 63 y.o.  female Working Diagnosis:  
1. SOB (shortness of breath) 2. Chest pain, unspecified type 3. Anasarca 4. Pleural effusion, bilateral   
5. Chronic renal failure, unspecified CKD stage 6. Elevated troponin COVID-19 Suspicion:  yes Sepsis present:  no  Reassessment needed: no 
Code Status:  Full Code Readmission: no 
Isolation Requirements:  yes Recommended Level of Care:  telemetry Department:[facility] Adult ED - [phone] Other: Patient is a 70-year-old female with past medical history significant for a flutter, breast cancer, chronic kidney disease, type 2 diabetes status post left foot amputation in removal of part of her clavicle due to infection from an vegetation on the pacemaker lead in the past who presents emergency department with 2 weeks of chest pain and shortness of breath. Patient found to be in anasarca with bilateral pleural effusions. Troponin elevated at 0.11 however patient has chronic renal failure and recently had a pacemaker placed by Dr. Devonte hoyt for sick sinus syndrome on 17th November. Case discussed with on-call cardiologist Dr. Linette Glez who agreed with further observation in the hospital for trending of her troponins. Patient denies any fevers but she states she feels just generally unwell. Covid test ordered

## 2020-11-24 NOTE — H&P
[address] [facility]  Hospitalist Group History and Physical 
 
Primary Care Provider: William Lee MD 
Date of Service:  11/23/2020 Subjective:  
 
Ester Richardson is a 63 y.o. female with a past medical history of paroxysmal atrial fibrillation on warfarin s/p AV node ablation with pacemaker replacement, hypertension, type 2 diabetes mellitus II, stage IV CKD, breast cancer s/p bl mastectomy, L BKA, s/p osteomyelitis complicated by staph bacteremia, anxiety/depression who presents from her PCP office with right-sided chest pain, shortness of breath, and vomiting. Also notes leakage from right femoral artery leadless pacemaker insertion site. In the ED w/u was significant for elevated SBP to 170, troponin elevated to 0.11, pro-BNP greater than 35,000. Chest x-ray was normal, right arterial duplex was negative for pseudoaneurysm of the right groin. CT chest with interval resection of the medial right clavicle and 3 cm postoperative seroma/hematoma with bibasilar atelectasis, abdominal, and pelvic ascites, and diffuse anasarca. Patient was hospitalized in 4/2020 for dual lead extraction of PM s/p sepsis from osteomyelitis complicated by MSSA bacteremia. She completed abx therapy, and subsequently underwentA AV node ablation with reimplantation of leadless PM on 11/17 by Dr. Roby Hdez. This was preceded by hospitalization at [facility] for osteomyelitis that resulted in bacteremia requiring L bka and causing L sternoclavicular joint infection s/p I&D, and old PM lead vegetation s/p laser lead extraction and replacement with leadless pm. 
 
COVID, and UA were collected. Patient received dilaudid, and sublingual nitroglycerin. Review of Systems: A comprehensive review of systems was negative except for that written in the History of Present Illness. Past Medical History:  
Diagnosis Date  Acquired lymphedema Right arm  Arrhythmia  Asthma  Atrial fibrillation ([address] Dr. Daria Cueto and Dr. Gabriele [facility])  Cancer ([facility] [address] bilateral breast  
 Chronic kidney disease  Diabetes mellitus type II   
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  
Procedure Laterality Date  ABDOMEN SURGERY PROC UNLISTED    
 gastric bypass  GASTRIC BYPASS,OBESE<150CM SAW-EN-Y  7/08  
 [facility]  HX AFIB ABLATION    
 HX AMPUTATION FOOT Left 04/2020  HX BREAST RECONSTRUCTION Bilateral   
 HX CARPAL TUNNEL RELEASE [address] RIGHT MODIFIED RADICAL   
 HX MASTECTOMY Left   
 no lymphnode removal  
 HX MOHS PROCEDURES  1995  
 right  HX ORTHOPAEDIC Right   
 knee surgery  HX PACEMAKER  11/17/2020 Dr. Sera Patino. Insertion of permanent pacemaker. AV node ablation  HX PACEMAKER    
 IR INSERT TUNL CVC W PORT OVER 5 YEARS    
 IR INSERT TUNL CVC W/O PORT OVER 5 YR  5/4/2020  IR INSERT TUNL CVC W/O PORT OVER 5 YR  9/8/2020  IR REMOVE TUNL CVAD W/O PORT / PUMP  6/26/2020  IR REMOVE TUNL CVAD W/O PORT / PUMP  10/19/2020  [address]  8.21.07  
 HI [address] 9[address] AND 1994  HI RELOCATION OF SKIN POCKET FOR PACEMAKER N/A 4/24/2020 RELOCATE 2 [facility] performed by Junior Perez MD at [address] CATH LAB  HI RMVL TRANSVNS PM ELTRD 1 LEAD SYS ATR/VENTR N/A 4/24/2020 Remove Pacemaker Dual Leads performed by Harlan Burrell MD at [facility] CATH LAB  HI RMVL TRANSVNS PM ELTRD 1 LEAD SYS ATR/VENTR N/A 4/27/2020 REMOVE PACEMAKER DUAL LEADS performed by Harlan Burrell MD at [address] CATH LAB  HI TRABECULOPLASTY BY LASER SURGERY    
 US GUIDE ASP OVARIAN CYST ABD APPROACH  8.21.07  
 RIGHT Prior to Admission medications Medication Sig Start Date End Date Taking? Authorizing Provider  
sertraline (ZOLOFT) 100 mg tablet  10/30/20   Provider, Historical  
warfarin (Coumadin) 1 mg tablet 2 mg M-W-FR and 1 all other days    Provider, Historical  
ondansetron (Zofran ODT) 4 mg disintegrating tablet Take 1 Tab by mouth every eight (8) hours as needed for Nausea. 11/5/20   Cora ARIAS MD  
metoprolol tartrate (LOPRESSOR) 25 mg tablet Take 1 Tab by mouth two (2) times a day. 10/13/20   Tamar Stone ANP  
sertraline (ZOLOFT) 25 mg tablet Take 25 mg by mouth daily. Take with 100 mg tablet every morning 10/6/20   Provider, Historical  
bumetanide (BUMEX) 1 mg tablet Take 0.5 mg by mouth two (2) times a day. Provider, Historical  
insulin glargine (Lantus Solostar U-100 Insulin) 100 unit/mL (3 mL) inpn 10 Units by SubCUTAneous route Daily (before breakfast). Provider, Historical  
hydrALAZINE (APRESOLINE) 100 mg tablet Take 1 Tab by mouth three (3) times daily. 6/12/20   Ana Max MD  
busPIRone (BUSPAR) 10 mg tablet TAKE ONE TABLET BY MOUTH TWICE A DAY 5/24/19   Ana Max MD  
 
Allergies Allergen Reactions  Ace Inhibitors Cough  Amlodipine Swelling  Avapro [Irbesartan] Unable to Obtain  Bactrim [Sulfamethoxazole-Trimethoprim] Other (comments) Sore mouth  Captopril Cough  Carvedilol Diarrhea  Contrast Agent [Iodine] Itching [address]  Morphine Nausea and Vomiting and Swelling  Penicillins Hives Did not tolerate cefepime 3/2020  Pravachol [Pravastatin] Nausea Only  Seafood [Shellfish Containing Products] Hives  Singulair [Montelukast] Other (comments)  
  palpitations Family History Problem Relation Age of Onset  Cancer Mother  Heart Disease Mother  Alcohol abuse Father  Diabetes Brother  Hypertension Brother SOCIAL HISTORY: 
Patient resides at Home. Patient ambulates with walker/prosthetic leg Smoking history: never Alcohol history: none Objective:  
 
 
Physical Exam:  
Visit Vitals BP (!) 163/86 Pulse 90 Temp 98 °F (36.7 °C) Resp 12 SpO2 98% General:  Alert, cooperative, no distress. Chronically-ill appearing Head:  Normocephalic, without obvious abnormality, atraumatic. Eyes:  Conjunctivae/corneas clear. PEOMs intact. Throat: Lips, mucosa, and tongue normal. Teeth and gums normal.  
Neck: Supple, symmetrical, trachea midline, no adenopathy, thyroid: no enlargement/tenderness/nodules, no carotid bruit and no JVD. Back:   Symmetric, no curvature. ROM normal.   
Lungs:   Clear to auscultation bilaterally. Chest wall:  Fluctuance over R clavicle Heart:  Regular rate and rhythm, S1, S2 normal, no murmur, click, rub or gallop. Abdomen:   Soft, non-tender. Full. Bowel sounds normal. No masses,  No organomegaly. Extremities: L BKA, R groin wound with dressing, and clear drainage. No palpable pulsating mass. Pulses: 2+ radial pulses, 2+ R dp pulse Skin: Skin color, texture, turgor normal. No rashes or lesions. Cap refill: Brisk, less than 3 seconds Pulses: 2+, symmetric in all extremities ECG: V-paced Data Review: All diagnostic labs and studies have been reviewed. Chest x-ray: no acute cardiopulmonary process Assessment:  
 
Active Problems: * No active hospital problems. * 
 
 
Plan:  
 
#Chest Pain 
-2/2 R clavicular fluid collection s/p clavicular resection/surgery 
-consider IR for drainage -levaquin in the ED, continue with vancomycin given past wound infection and bacteremia with S. Aureus sensitive to vanc 
-consult thoracic surgery (Dr. Kj Holley) #Elevated Troponin 
-EKG V paced 
-troponin 0.11 
-trend troponin #Parox A-fib s/p AV node ablation -INR 1.9 
-hold coumadin pending poss drainage fluid collection R clavicle, and worsening anemia 
-cardiology was consulted in the ED 
-consider R groin imaging due to continued leakage #Normocytic Anemia 
-H/H 8.3/27.8 
-slightly lower than b/l 9 
-monitor #Stage IV CKD with non-AG acidosis 
-Bun/creat 23/2.17, K+ 4.5, CO2 17, AG 10 
-daily BMP Dvt ppx: on coumadin FUNCTIONAL STATUS PRIOR TO HOSPITALIZATION Ambulatory with Use of Assistive Devices Signed By: Renetta Lozano MD   
 November 23, 2020

## 2020-11-24 NOTE — CONSULTS
Consult Subjective:  
 
Debbi Manjarrez is a 63 y.o.  female who is being seen in the ER for fluid collection s/p right SC joint I&D. She is well known to TS, having had the I&D of her right SC joint in August of this year. I saw her in clinic for follow up in October. Her incisions at that visit were all well healed. She presented to the ER yesterday with complaints of SOB from her PCP office. Chest CT revealed Postsurgical changes of resection of the medial right 
clavicle. There is a 7.4 x 3 cm oval lesion in the surgical bed, which may represent a postoperative seroma or a hematoma. Her PMH and PSH are listed below. Past Medical History:  
Diagnosis Date  Acquired lymphedema Right arm  Arrhythmia  Asthma  Atrial fibrillation ([address] Dr. José Miguel Alvarado and [facility]  Cancer ([address] bilateral breast  
 Chronic kidney disease  Diabetes mellitus type II   
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  
Procedure Laterality Date  ABDOMEN SURGERY PROC UNLISTED    
 gastric bypass  GASTRIC BYPASS,OBESE<150CM SAW-EN-Y  7/08  
 [facility]  HX AFIB ABLATION    
 HX AMPUTATION FOOT Left 04/2020  HX BREAST RECONSTRUCTION Bilateral   
 HX CARPAL TUNNEL RELEASE [address] [facility] [address]  RIGHT MODIFIED RADICAL   
 HX MASTECTOMY Left   
 no lymphnode removal  
 [address]  
 right  HX ORTHOPAEDIC Right   
 knee surgery  HX PACEMAKER  11/17/2020 Dr. Bari Gonzalez. Insertion of permanent pacemaker. AV node ablation  HX PACEMAKER    
 IR INSERT TUNL CVC W PORT OVER 5 YEARS    
 IR INSERT TUNL CVC W/O PORT OVER 5 YR  5/4/2020  IR INSERT TUNL CVC W/O PORT OVER 5 YR  9/8/2020  IR REMOVE TUNL CVAD W/O PORT / PUMP  6/26/2020  IR REMOVE TUNL CVAD W/O PORT / PUMP  10/19/2020  [address]  8.21.07  
 IA [address] AND 1994  [place] RELOCATION OF SKIN POCKET FOR PACEMAKER N/A 4/24/2020 [address]OC[address] performed by Caleb Carrizales MD at [address] CATH LAB  IA RMVL TRANSVNS PM ELTRD 1 LEAD SYS ATR/VENTR N/A 4/24/2020 Remove Pacemaker Dual Leads performed by Caleb Carrizales MD at [facility] CATH LAB  IA RMVL TRANSVNS PM ELTRD 1 LEAD SYS ATR/VENTR N/A 4/27/2020 REMOVE PACEMAKER DUAL LEADS performed by Caleb Carrizales MD at [address] CATH LAB  IA TRABECULOPLASTY BY LASER SURGERY    
 US GUIDE ASP OVARIAN CYST ABD APPROACH  8.21.07  
 RIGHT Family History Problem Relation Age of Onset  Cancer Mother  Heart Disease Mother  Alcohol abuse Father  Diabetes Brother  Hypertension Brother Social History Tobacco Use  Smoking status: Never Smoker  Smokeless tobacco: Never Used Substance Use Topics  Alcohol use: Not Currently Current Facility-Administered Medications Medication Dose Route Frequency  vancomycin - pharmacy to dose   Other Rx Dosing/Monitoring  [START ON 11/25/2020] vancomycin (VANCOCIN) 1250 mg in  ml infusion  1,250 mg IntraVENous Q18H  
 metoprolol (LOPRESSOR) injection 5 mg  5 mg IntraVENous Q3H  
 sodium chloride (NS) flush 5-40 mL  5-40 mL IntraVENous Q8H  
 sodium chloride (NS) flush 5-40 mL  5-40 mL IntraVENous PRN  
  oxyCODONE-acetaminophen (PERCOCET) 5-325 mg per tablet 2 Tab  2 Tab Oral Q6H PRN  
 HYDROmorphone (PF) (DILAUDID) injection 0.5 mg  0.5 mg IntraVENous Q4H PRN Current Outpatient Medications Medication Sig  sertraline (ZOLOFT) 100 mg tablet  warfarin (Coumadin) 1 mg tablet 2 mg M-W-FR and 1 all other days  ondansetron (Zofran ODT) 4 mg disintegrating tablet Take 1 Tab by mouth every eight (8) hours as needed for Nausea.  metoprolol tartrate (LOPRESSOR) 25 mg tablet Take 1 Tab by mouth two (2) times a day.  sertraline (ZOLOFT) 25 mg tablet Take 25 mg by mouth daily. Take with 100 mg tablet every morning  bumetanide (BUMEX) 1 mg tablet Take 0.5 mg by mouth two (2) times a day.  insulin glargine (Lantus Solostar U-100 Insulin) 100 unit/mL (3 mL) inpn 10 Units by SubCUTAneous route Daily (before breakfast).  hydrALAZINE (APRESOLINE) 100 mg tablet Take 1 Tab by mouth three (3) times daily.  busPIRone (BUSPAR) 10 mg tablet TAKE ONE TABLET BY MOUTH TWICE A DAY Allergies Allergen Reactions  Avapro [Irbesartan] Unable to Obtain  Bactrim [Sulfamethoxazole-Trimethoprim] Other (comments) Sore mouth  Contrast Agent [Iodine] Itching [address]  Morphine Nausea and Vomiting and Swelling  Penicillins Hives Did not tolerate cefepime 3/2020  Pravachol [Pravastatin] Nausea Only  Seafood [Shellfish Containing Products] Hives  Singulair [Montelukast] Other (comments)  
  palpitations  Ace Inhibitors Cough  Amlodipine Swelling  Captopril Cough  Carvedilol Diarrhea Review of Systems: 
Pertinent items are noted in the History of Present Illness. Objective: Intake and Output:   
No intake/output data recorded. No intake/output data recorded. Physical Exam:  
Visit Vitals BP (!) 157/92 Pulse 90 Temp 98 °F (36.7 °C) Resp 20 SpO2 97% General appearance: alert, cooperative, no distress, appears older than stated age Chest: right clavicle incision is well healed, but elevated 2-3 cm and tender to palpation. Unable to aspirate any fluid from the area. Lungs: clear to auscultation bilaterally Heart: regular rate and rhythm, S1, S2 normal, no murmur, click, rub or gallop Abdomen: soft, non-tender. Bowel sounds normal. No masses,  no organomegaly Extremities: extremities normal, atraumatic, no cyanosis or edema, Left BKA Skin: Skin color, texture, turgor normal. No rashes or lesions Data Review:  
Recent Results (from the past 24 hour(s)) CBC WITH AUTOMATED DIFF Collection Time: 11/23/20  2:26 PM  
Result Value Ref Range WBC 6.5 3.6 - 11.0 K/uL  
 RBC 3.37 (L) 3.80 - 5.20 M/uL HGB 8.3 (L) 11.5 - 16.0 g/dL HCT 27.8 (L) 35.0 - 47.0 % MCV 82.5 80.0 - 99.0 FL  
 MCH 24.6 (L) 26.0 - 34.0 PG  
 MCHC 29.9 (L) 30.0 - 36.5 g/dL  
 RDW 17.3 (H) 11.5 - 14.5 % PLATELET 582 567 - 894 K/uL MPV 11.3 8.9 - 12.9 FL  
 NRBC 5.7 (H) 0  WBC ABSOLUTE NRBC 0.37 (H) 0.00 - 0.01 K/uL NEUTROPHILS 68 32 - 75 % LYMPHOCYTES 13 12 - 49 % MONOCYTES 17 (H) 5 - 13 % EOSINOPHILS 1 0 - 7 % BASOPHILS 1 0 - 1 % IMMATURE GRANULOCYTES 0 0.0 - 0.5 % ABS. NEUTROPHILS 4.4 1.8 - 8.0 K/UL  
 ABS. LYMPHOCYTES 0.8 0.8 - 3.5 K/UL  
 ABS. MONOCYTES 1.1 (H) 0.0 - 1.0 K/UL  
 ABS. EOSINOPHILS 0.1 0.0 - 0.4 K/UL  
 ABS. BASOPHILS 0.1 0.0 - 0.1 K/UL  
 ABS. IMM. GRANS. 0.0 0.00 - 0.04 K/UL  
 DF SMEAR SCANNED    
 RBC COMMENTS ANISOCYTOSIS 1+ 
    
 RBC COMMENTS DAKOTAH CELLS 
PRESENT 
    
 RBC COMMENTS OVALOCYTES PRESENT 
    
METABOLIC PANEL, COMPREHENSIVE Collection Time: 11/23/20  2:26 PM  
Result Value Ref Range Sodium 134 (L) 136 - 145 mmol/L Potassium 4.5 3.5 - 5.1 mmol/L Chloride 107 97 - 108 mmol/L  
 CO2 17 (L) 21 - 32 mmol/L Anion gap 10 5 - 15 mmol/L Glucose 162 (H) 65 - 100 mg/dL BUN 23 (H) 6 - 20 MG/DL Creatinine 2.17 (H) 0.55 - 1.02 MG/DL  
 BUN/Creatinine ratio 11 (L) 12 - 20 GFR est AA 28 (L) >60 ml/min/1.73m2 GFR est non-AA 23 (L) >60 ml/min/1.73m2 Calcium 9.0 8.5 - 10.1 MG/DL Bilirubin, total 1.2 (H) 0.2 - 1.0 MG/DL  
 ALT (SGPT) 23 12 - 78 U/L  
 AST (SGOT) 34 15 - 37 U/L Alk. phosphatase 123 (H) 45 - 117 U/L Protein, total 7.3 6.4 - 8.2 g/dL Albumin 3.4 (L) 3.5 - 5.0 g/dL Globulin 3.9 2.0 - 4.0 g/dL A-G Ratio 0.9 (L) 1.1 - 2.2    
TROPONIN I Collection Time: 11/23/20  2:26 PM  
Result Value Ref Range Troponin-I, Qt. 0.11 (H) <0.05 ng/mL MAGNESIUM Collection Time: 11/23/20  2:26 PM  
Result Value Ref Range Magnesium 2.4 1.6 - 2.4 mg/dL LIPASE Collection Time: 11/23/20  2:26 PM  
Result Value Ref Range Lipase 133 73 - 393 U/L  
NT-PRO BNP Collection Time: 11/23/20  2:26 PM  
Result Value Ref Range NT pro-BNP >35,000 (H) <125 PG/ML  
EKG, 12 LEAD, INITIAL Collection Time: 11/23/20  2:34 PM  
Result Value Ref Range Ventricular Rate 90 BPM  
 Atrial Rate 48 BPM  
 QRS Duration 158 ms Q-T Interval 438 ms QTC Calculation (Bezet) 535 ms Calculated R Axis 135 degrees Calculated T Axis -29 degrees Diagnosis Ventricular-paced rhythm When compared with ECG of 26-SEP-2020 10:39, 
Electronic ventricular pacemaker has replaced Atrial fibrillation DUPLEX LOWER EXT ARTERY RIGHT Collection Time: 11/23/20  5:19 PM  
Result Value Ref Range Right CFA prox sys PSV 97.2 cm/s Right Prox PFA A PSV 54.1 cm/s Right super femoral dist sys PSV 24.2 cm/s Right super femoral prox sys PSV 49.7 cm/s Right SFA Prox Jeff Ratio 0.5 URINALYSIS W/MICROSCOPIC Collection Time: 11/23/20  7:36 PM  
Result Value Ref Range Color YELLOW/STRAW Appearance CLEAR CLEAR Specific gravity 1.019 1.003 - 1.030    
 pH (UA) 5.0 5.0 - 8.0 Protein 100 (A) NEG mg/dL Glucose Negative NEG mg/dL Ketone Negative NEG mg/dL Bilirubin Negative NEG Blood Negative NEG Urobilinogen 1.0 0.2 - 1.0 EU/dL Nitrites Positive (A) NEG Leukocyte Esterase Negative NEG    
 WBC 0-4 0 - 4 /hpf  
 RBC 0-5 0 - 5 /hpf Epithelial cells FEW FEW /lpf Bacteria 4+ (A) NEG /hpf Hyaline cast 2-5 0 - 5 /lpf URINE CULTURE HOLD SAMPLE Collection Time: 11/23/20  7:36 PM  
 Specimen: Serum; Urine Result Value Ref Range Urine culture hold Urine on hold in Microbiology dept for 2 days. If unpreserved urine is submitted, it cannot be used for addtional testing after 24 hours, recollection will be required. PROTHROMBIN TIME + INR Collection Time: 11/23/20 10:22 PM  
Result Value Ref Range INR 1.9 (H) 0.9 - 1.1 Prothrombin time 19.0 (H) 9.0 - 11.1 sec SARS-COV-2 Collection Time: 11/23/20 10:22 PM  
Result Value Ref Range Specimen source Nasopharyngeal    
 Specimen source Nasopharyngeal    
 COVID-19 rapid test Not detected NOTD Specimen type NP Swab Health status Symptomatic Testing TROPONIN I Collection Time: 11/23/20 10:22 PM  
Result Value Ref Range Troponin-I, Qt. 0.12 (H) <0.05 ng/mL CULTURE, BLOOD, PAIRED Collection Time: 11/23/20 10:22 PM  
 Specimen: Blood Result Value Ref Range Special Requests: NO SPECIAL REQUESTS Culture result: NO GROWTH AFTER 4 HOURS    
SAMPLES BEING HELD Collection Time: 11/23/20 10:22 PM  
Result Value Ref Range SAMPLES BEING HELD 1LAV,1BLUE   
 COMMENT Add-on orders for these samples will be processed based on acceptable specimen integrity and analyte stability, which may vary by analyte. SARS-COV-2, PCR Collection Time: 11/23/20 10:22 PM  
 Specimen: Nasopharyngeal  
Result Value Ref Range Specimen source Nasopharyngeal    
 SARS-CoV-2 Not detected NOTD    
EKG, 12 LEAD, INITIAL Collection Time: 11/24/20 12:20 AM  
Result Value Ref Range  Ventricular Rate 90 BPM  
 Atrial Rate 86 BPM  
 QRS Duration 164 ms Q-T Interval 438 ms QTC Calculation (Bezet) 535 ms Calculated R Axis 105 degrees Calculated T Axis 39 degrees Diagnosis Ventricular-paced rhythm When compared with ECG of 23-NOV-2020 14:34, MANUAL COMPARISON REQUIRED, DATA IS UNCONFIRMED 
  
TROPONIN I Collection Time: 11/24/20  4:54 AM  
Result Value Ref Range Troponin-I, Qt. 0.12 (H) <0.05 ng/mL METABOLIC PANEL, BASIC Collection Time: 11/24/20  4:54 AM  
Result Value Ref Range Sodium 136 136 - 145 mmol/L Potassium 4.2 3.5 - 5.1 mmol/L Chloride 109 (H) 97 - 108 mmol/L  
 CO2 18 (L) 21 - 32 mmol/L Anion gap 9 5 - 15 mmol/L Glucose 135 (H) 65 - 100 mg/dL BUN 23 (H) 6 - 20 MG/DL Creatinine 1.93 (H) 0.55 - 1.02 MG/DL  
 BUN/Creatinine ratio 12 12 - 20 GFR est AA 32 (L) >60 ml/min/1.73m2 GFR est non-AA 26 (L) >60 ml/min/1.73m2 Calcium 8.4 (L) 8.5 - 10.1 MG/DL MAGNESIUM Collection Time: 11/24/20  4:54 AM  
Result Value Ref Range Magnesium 2.3 1.6 - 2.4 mg/dL CBC WITH AUTOMATED DIFF Collection Time: 11/24/20  4:54 AM  
Result Value Ref Range WBC 6.0 3.6 - 11.0 K/uL  
 RBC 3.20 (L) 3.80 - 5.20 M/uL HGB 7.7 (L) 11.5 - 16.0 g/dL HCT 26.3 (L) 35.0 - 47.0 % MCV 82.2 80.0 - 99.0 FL  
 MCH 24.1 (L) 26.0 - 34.0 PG  
 MCHC 29.3 (L) 30.0 - 36.5 g/dL  
 RDW 17.2 (H) 11.5 - 14.5 % PLATELET 940 661 - 866 K/uL MPV 10.8 8.9 - 12.9 FL  
 NRBC 5.5 (H) 0  WBC ABSOLUTE NRBC 0.33 (H) 0.00 - 0.01 K/uL NEUTROPHILS 65 32 - 75 % LYMPHOCYTES 12 12 - 49 % MONOCYTES 18 (H) 5 - 13 % EOSINOPHILS 3 0 - 7 % BASOPHILS 1 0 - 1 % IMMATURE GRANULOCYTES 1 (H) 0.0 - 0.5 % ABS. NEUTROPHILS 3.8 1.8 - 8.0 K/UL  
 ABS. LYMPHOCYTES 0.7 (L) 0.8 - 3.5 K/UL  
 ABS. MONOCYTES 1.1 (H) 0.0 - 1.0 K/UL  
 ABS. EOSINOPHILS 0.2 0.0 - 0.4 K/UL  
 ABS. BASOPHILS 0.1 0.0 - 0.1 K/UL  
 ABS. IMM.  GRANS. 0.1 (H) 0.00 - 0.04 K/UL  
 DF SMEAR SCANNED    
 RBC COMMENTS ANISOCYTOSIS 2+ 
    
 RBC COMMENTS HYPOCHROMIA 1+ 
    
 RBC COMMENTS OVALOCYTES 1+ RBC COMMENTS POLYCHROMASIA 1+ 
    
 RBC COMMENTS ATYPICAL LYMPHOCYTES PRESENT 
TARGET CELLS 1+ RBC COMMENTS DAKOATH CELLS 1+ 
    
EKG, 12 LEAD, INITIAL Collection Time: 11/24/20  6:22 AM  
Result Value Ref Range Ventricular Rate 90 BPM  
 Atrial Rate 92 BPM  
 QRS Duration 166 ms  
 Q-T Interval 446 ms  
 QTC Calculation (Bezet) 545 ms Calculated R Axis 146 degrees Calculated T Axis 89 degrees Diagnosis Ventricular-paced rhythm When compared with ECG of 24-NOV-2020 00:20, 
MANUAL COMPARISON REQUIRED, DATA IS UNCONFIRMED Chest CT 11/24/2020 CHEST: 
Chest wall/thoracic inlet: Postsurgical changes of resection of the medial right 
clavicle. There is a 7.4 x 3 cm oval lesion in the surgical bed, which may 
represent a postoperative seroma or a hematoma. Collette Ruts Thyroid: Within normal limits. Mediastinum/chen: Multiple small mediastinal and hilar lymph nodes. Heart/vessels: Extensive coronary artery calcifications. Small pericardial 
effusion. Lungs/Pleura: Right lower lobe pulmonary nodule is unchanged. Mild bibasilar 
atelectasis small bilateral pleural effusions. Collette Ruts ABDOMEN: 
Liver: Mild perihepatic ascites, not significantly changed. 3 cm soft tissue 
lesion in the anterior abdomen medial to the liver is unchanged. Gallbladder/Biliary: Status post cholecystectomy. Spleen: Perisplenic ascites, mildly worse. Pancreas: No acute process. Adrenals: Chronic right adrenal adenoma. Kidneys: Atrophic. No hydronephrosis. Peritoneum/Mesenteries: Mild abdominal and pelvic ascites. Extraperitoneum: Diffuse body wall edema. Gastrointestinal tract: No bowel obstruction or perforation. The appendix is 
grossly normal. 
Vascular: Diffuse calcific aortic atherosclerosis without aneurysm. Collette Ruts PELVIS: 
Extraperitoneum: Within normal limits. Ureters: Within normal limits. Bladder: Within normal limits. Reproductive System: Uterus is noted. . 
MSK: Degenerative changes. No acute osseous abnormality. Crandall Plana IMPRESSION IMPRESSION: 
1. Evidence of interval resection of the medial right clavicle. There is a 3 cm 
postoperative seroma/hematoma in the surgical bed. 2. Small bilateral pleural effusions with mild bibasilar atelectasis. 3. Mild abdominal and pelvic ascites. 4. Diffuse anasarca. Assessment:  
 
Active Problems: 
  Chest pain (11/23/2020) Plan:  
Admit to hospitalist service Recommend surgical excision of the right SC joint wound tomorrow she agrees NPO after midnight OK to have diet today T&S Signed By: Sherin Sandoval NP November 24, 2020

## 2020-11-24 NOTE — PROGRESS NOTES
Problem: Risk for Spread of Infection Goal: Prevent transmission of infectious organism to others Description: Prevent the transmission of infectious organisms to other patients, staff members, and visitors. Outcome: Progressing Towards Goal 
  
Problem: Patient Education:  Go to Education Activity Goal: Patient/Family Education Outcome: Progressing Towards Goal 
  
Problem: Falls - Risk of 
Goal: *Absence of Falls Description: Document Keya Drake Fall Risk and appropriate interventions in the flowsheet. Outcome: Progressing Towards Goal 
Note: Fall Risk Interventions: 
Mobility Interventions: Patient to call before getting OOB Medication Interventions: Patient to call before getting OOB Elimination Interventions: Patient to call for help with toileting needs Problem: Patient Education: Go to Patient Education Activity Goal: Patient/Family Education Outcome: Progressing Towards Goal 
  
Problem: Discharge Planning Goal: *Discharge to safe environment Outcome: Progressing Towards Goal 
  
Problem: Pressure Injury - Risk of 
Goal: *Prevention of pressure injury Description: Document Valdemar Scale and appropriate interventions in the flowsheet. Outcome: Progressing Towards Goal 
Note: Pressure Injury Interventions: 
Sensory Interventions: Assess changes in LOC, Assess need for specialty bed, Minimize linen layers, Keep linens dry and wrinkle-free Moisture Interventions: Minimize layers Nutrition Interventions: Document food/fluid/supplement intake Friction and Shear Interventions: Lift sheet, Minimize layers Problem: Patient Education: Go to Patient Education Activity Goal: Patient/Family Education Outcome: Progressing Towards Goal

## 2020-11-24 NOTE — ED NOTES
Lab called and spoke to Maggy to see if they could send COVID swabs to ED. She stated she is able to send to ED shortly. Will continue to check for swabs and will perform when received.

## 2020-11-24 NOTE — ED NOTES
Pt repositioned in bed and given extra pillows for comfort. Still awaiting bed assignment. No acute distress. Pt on cardiac monitor with VSS and no c/o cp and/or worsening sob. Pt resting with HOB elevated to comfort (approx. 45 degrees) and non-labored respirations noted.

## 2020-11-24 NOTE — ED NOTES
Covid tests completed (Rapid and admission PCR). Pt tolerated. Awaiting bed assignment for admission.

## 2020-11-25 NOTE — PROGRESS NOTES
Hospitalist Service Progress Note Daily Progress Note: 2020 Assessment/Plan:  
#Chest Pain 
-2/2 R clavicular fluid collection s/p clavicular resection/surgery osteomyelitis 
-Going to the OR tomorrow by thoracic surgery 
-continue with vancomycin given past wound infection and bacteremia with S. Aureus sensitive to vanc 
-consult thoracic surgery (Dr. Gilliam Slider) 
  
#Elevated Troponin 
-EKG V paced 
-troponin 0.11 #Elevated BNP of more than 35,000 However lungs are clear to auscultation Chest CT did not show any evidence of congestion Patient has anasarca her albumin of within normal limits her recent echocardiogram was unremarkable creatinine is 1.9 not sure why 
  
#Parox A-fib s/p AV node ablation -INR 1.9 
-hold coumadin 
-Going to the OR tomorrow 
  #Normocytic Anemia 
-H/H 8.3/27.8 
-slightly lower than b/l 9 
-monitor 
  
#Stage IV CKD with non-AG acidosis 
-Bun/creat 23/2.17, K+ 4.5, CO2 17, AG 10 
 
  
Dvt ppx: on coumadin FUNCTIONAL STATUS PRIOR TO HOSPITALIZATION Ambulatory with Use of Assistive Subjective: No Complaint Review of Systems:  
 
 
Objective:  
Physical examination Visit Vitals BP (!) 159/93 (BP 1 Location: Left arm, BP Patient Position: At rest) Pulse 90 Temp 98.3 °F (36.8 °C) Resp 16 SpO2 100% Temp (24hrs), Av.1 °F (36.7 °C), Min:98 °F (36.7 °C), Max:98.3 °F (36.8 °C) O2 Device: Room air Patient Vitals for the past 24 hrs: 
 Temp Pulse Resp BP SpO2  
20 98.3 °F (36.8 °C) 90 16 (!) 159/93 100 % 20 1722    (!) 156/88   
20 1621 98.1 °F (36.7 °C) 90 18 (!) 151/94 99 % 20 1556     99 % 20 1555  90 16  95 % 20 1554  90 12  95 % 20 1553  90 12  96 % 20 1552  90 14  95 % 20 1551  90 13  95 % 20 1550  90 15  95 % 20 1549  90 11  95 % 20 1548  90 12  95 % 11/24/20 1547  90 14  95 % 11/24/20 1546  90 12  95 % 11/24/20 1545  90 13  95 % 11/24/20 1544  90 13  96 % 11/24/20 1543  90 13  96 % 11/24/20 1542  90 13  96 % 11/24/20 1541  90 13  96 % 11/24/20 1540  90 12  95 % 11/24/20 1539  90 13  96 % 11/24/20 1538  90 13  96 % 11/24/20 1537  91 12  96 % 11/24/20 1536  91 14  96 % 11/24/20 1535  91 11  96 % 11/24/20 1534  91 13  96 % 11/24/20 1533  91 13  96 % 11/24/20 1532  91 13  96 % 11/24/20 1531  91 13  96 % 11/24/20 1530  91 14  96 % 11/24/20 1529  91 17  97 % 11/24/20 1528  91 19  98 % 11/24/20 1527  91 22  97 % 11/24/20 1526  91 20  97 % 11/24/20 1525  91 15  95 % 11/24/20 1524  91 13  95 % 11/24/20 1523  91 14  96 % 11/24/20 1522  90 14  94 % 11/24/20 1521  90 12  95 % 11/24/20 1520  90 13  95 % 11/24/20 1519  90 14  96 % 11/24/20 1518  90 12  95 % 11/24/20 1517  90 12  95 % 11/24/20 1516  90 12  95 % 11/24/20 1515  90 13  95 % 11/24/20 1514  90 13  95 % 11/24/20 1513  90 12  93 % 11/24/20 1512  90 13  95 % 11/24/20 1511  90 14  94 % 11/24/20 1510  90 13  94 % 11/24/20 1509  90 14  95 % 11/24/20 1508  90 14  94 % 11/24/20 1507  90 11  94 % 11/24/20 1506  90 13  94 % 11/24/20 1505  90 12  94 % 11/24/20 1504  90 12  94 % 11/24/20 1503  90 13  94 % 11/24/20 1502  90 16  94 % 11/24/20 1501  90 14  94 % 11/24/20 1500  90 13 (!) 161/92 94 % 11/24/20 1459  90 14  95 % 11/24/20 1458  90 13  95 % 11/24/20 1457  90 13  95 % 11/24/20 1456  90 13  95 % 11/24/20 1454  90 14  94 % 11/24/20 1453  90 13  95 % 11/24/20 1452  90 13  94 % 11/24/20 1451  90 14  95 % 11/24/20 1450  90 14  95 % 11/24/20 1449  90 14  95 % 11/24/20 1448  90 15  95 % 11/24/20 1447  90 13  94 % 11/24/20 1446  90 15  96 % 11/24/20 1445  90 14  95 % 11/24/20 1444  90 14  95 % 11/24/20 1443  90 14  95 % 11/24/20 1442  90 15  95 % 11/24/20 1441  90 13  96 % 11/24/20 1440  90 14  95 % 11/24/20 1438  90 15  95 % 11/24/20 1437  90 20  94 % 11/24/20 1436  90 15  95 % 11/24/20 1435  90 15  95 % 11/24/20 1434  92 15  95 % 11/24/20 1433  90 14  95 % 11/24/20 1432  90 15  95 % 11/24/20 1431  90 15  95 % 11/24/20 1430  90 15  95 % 11/24/20 1429  90 16  95 % 11/24/20 1428  90 15  94 % 11/24/20 1427  90 15  95 % 11/24/20 1426  90 16  95 % 11/24/20 1425  90 17  95 % 11/24/20 1424  90 17  96 % 11/24/20 1423  90 17  96 % 11/24/20 1422  90 18  96 % 11/24/20 1421  90 16  97 % 11/24/20 1420  90 18  97 % 11/24/20 1419  90 18  97 % 11/24/20 1418  90 16  96 % 11/24/20 1417  90 20  97 % 11/24/20 1416  90 23 (!) 163/89 98 % 11/24/20 1415  90 20  99 % 11/24/20 1414  91 17  98 % 11/24/20 1413  90 21  98 % 11/24/20 1412  91 20  97 % 11/24/20 1411  90 18  97 % 11/24/20 1410  90 16  95 % 11/24/20 1409  90 14 (!) 163/87 95 % 11/24/20 1408  90 14  96 % 11/24/20 1407  90 15  96 % 11/24/20 1406  90 22  97 % 11/24/20 1405  91   98 % 11/24/20 1404  90 18  95 % 11/24/20 1403  90 14  96 % 11/24/20 1402  90 15  96 % 11/24/20 1401  91 18  97 % 11/24/20 1400  90 17  96 % 11/24/20 1359  90 15  96 % 11/24/20 1358  90 16  96 % 11/24/20 1357  90 15  95 % 11/24/20 1356  90 15  96 % 11/24/20 1355  90 16  96 % 11/24/20 1354  90 15  95 % 11/24/20 1353  90 15  95 % 11/24/20 1352  90 15  96 % 11/24/20 1351  90 14  95 % 11/24/20 1350  90 9  96 % 11/24/20 1349  90   98 % 11/24/20 1348  90 16  95 % 11/24/20 1347  90 15  95 % 11/24/20 1346  90 15  95 % 11/24/20 1345  90 17  95 % 11/24/20 1344  90 16  96 % 11/24/20 1343  90 14  96 % 11/24/20 1342  90 15  94 % 11/24/20 1341  90 14  94 % 11/24/20 1340  90 14  95 % 11/24/20 1339  90 15  95 % 11/24/20 1338  90 15  94 % 11/24/20 1337  90 16  94 % 11/24/20 1336  90 20  95 % 11/24/20 1335  90 17  95 % 11/24/20 1334  90 16  95 % 11/24/20 1333  90 17  96 % 11/24/20 1332  90 18  95 % 11/24/20 1331  90 15  94 % 11/24/20 1330  90 16  95 % 11/24/20 1329  90 16  95 % 11/24/20 1328  91 15  95 % 11/24/20 1327  90 16  95 % 11/24/20 1326  90 15  95 % 11/24/20 1325  91 15  95 % 11/24/20 1324  90 15  95 % 11/24/20 1323  90 16  95 % 11/24/20 1322  90 16  96 % 11/24/20 1321  90 16  95 % 11/24/20 1320  90 15  95 % 11/24/20 1319  91 15  95 % 11/24/20 1318  90 16  95 % 11/24/20 1317  91 15  96 % 11/24/20 1316  90 16  96 % 11/24/20 1315  91 19  97 % 11/24/20 1314  90 21  95 % 11/24/20 1313  91 16  96 % 11/24/20 1312  90 16 (!) 163/87 96 % 11/24/20 1311  90 18  96 % 11/24/20 1310  91 16 (!) 164/91 93 % 11/24/20 1309  90 14 (!) 163/99 93 % 11/24/20 1308  91 16  94 % 11/24/20 1307  90 17  94 % 11/24/20 1306  90 16  94 % 11/24/20 1305  91 16  94 % 11/24/20 1304  90 16  94 % 11/24/20 1303  90 17  94 % 11/24/20 1302  90 17  94 % 11/24/20 1301  91 17  95 % 11/24/20 1300  90 15 (!) 156/96 94 % 11/24/20 1259  90 15  94 % 11/24/20 1258  90 17  94 % 11/24/20 1257  91 17  95 % 11/24/20 1256  90 17  95 % 11/24/20 1255  91 16  96 % 11/24/20 1254  91 17  95 % 11/24/20 1253  90 17  95 % 11/24/20 1252  91 15  95 % 11/24/20 1251  91 15  96 % 11/24/20 1250  90 17  95 % 11/24/20 1249  90 17  95 % 11/24/20 1248  90 17  96 % 11/24/20 1247  90 18  95 % 11/24/20 1246  90 18  96 % 11/24/20 1245  90 20 (!) 172/96 97 % 11/24/20 1244  90 17  98 % 11/24/20 1243  90 16  99 % 11/24/20 1242  91 11  100 % 11/24/20 1241  90 10  100 % 11/24/20 1240  90 23  100 % 11/24/20 1239  91 19  100 % 11/24/20 1238  90 19  100 % 11/24/20 1237  90 18 (!) 163/93 98 % 11/24/20 1236  90 17  98 % 11/24/20 1235  90 22  98 % 11/24/20 1234  90 20  98 % 11/24/20 1233  90 22  98 % 11/24/20 1232  90 20  98 % 11/24/20 1231  90 19  98 % 11/24/20 1230  91 11  97 % 11/24/20 1229  90 11  98 % 11/24/20 1228  90 19  99 % 11/24/20 1227  90 19  97 % 11/24/20 1226  90 16  98 % 11/24/20 1225  90 18  98 % 11/24/20 1224  90 19  98 % 11/24/20 1223  90 14  98 % 11/24/20 1222  90 17  98 % 11/24/20 1221  90 19  98 % 11/24/20 1220  91 12  98 % 11/24/20 1219  90 19  98 % 11/24/20 1218  90 18  98 % 11/24/20 1217  90 23  97 % 11/24/20 1216  90 16  98 % 11/24/20 1215  90 20  97 % 11/24/20 1214  90 19  98 % 11/24/20 1213  90 18  98 % 11/24/20 1212  90 19  97 % 11/24/20 1211  90 20  97 % 11/24/20 1210  90 18  98 % 11/24/20 1209  90 17  98 % 11/24/20 1208  90 21  98 % 11/24/20 1207  90 20  98 % 11/24/20 1206  90 19  98 % 11/24/20 1205  90 25  98 % 11/24/20 1204  90 24  97 % 11/24/20 1203  90 19  98 % 11/24/20 1154  90 23  97 % 11/24/20 1153  90 23  98 % 11/24/20 1152  90 20  98 % 11/24/20 1151  90 19  98 % 11/24/20 1150  90 22  99 % 11/24/20 1149  90 18  99 % 11/24/20 1148  90 17  98 % 11/24/20 1147  90 22  97 % 11/24/20 1146  90 19  98 % 11/24/20 1145  90 20  98 % 11/24/20 1144  90 19  98 % 11/24/20 1143  90 20  98 % 11/24/20 1142  90 21  99 % 11/24/20 1141  90 18  98 % 11/24/20 1140  90 20  98 % 11/24/20 1139  90 21  97 % 11/24/20 1138  90 21  98 % 11/24/20 1137  90 20  98 % 11/24/20 1136  90 19  99 % 11/24/20 1135  90 20  98 % 11/24/20 1134  90 21  99 % 11/24/20 1133  90 24  99 % 11/24/20 1132  90 22  99 % 11/24/20 1131  90 20  98 % 11/24/20 1130  90 15 (!) 161/88 98 % 11/24/20 1129  90 28  99 % 11/24/20 1128  90 25  98 % 11/24/20 1127  90 18  99 % 11/24/20 1126  90 24  100 % 11/24/20 1125  90 26  99 % 11/24/20 1124  90 22  99 % 11/24/20 1123  90 25  99 % 11/24/20 1122  90 20  99 % 11/24/20 1121  90 21  99 % 11/24/20 1120  90 20  98 % 11/24/20 1119  90 20  99 % 11/24/20 1118  90 21  99 % 11/24/20 1117  90 19  99 % 11/24/20 1116  90 22  99 % 11/24/20 1115  90 23  99 % 11/24/20 1114  90 27  98 % 11/24/20 1113  90 20  99 % 11/24/20 1112  90 22  99 % 11/24/20 1111  90 23  99 % 11/24/20 1110  90 23  99 % 11/24/20 1109  90 16  98 % 11/24/20 1108  90 20  98 % 11/24/20 1107  90 17  99 % 11/24/20 1106 98 °F (36.7 °C) 90 20  97 % 11/24/20 1105  90 19 (!) 157/92 98 % 11/24/20 1104  90 25  98 % 11/24/20 1103  99 18  98 % 11/24/20 1102  90 17  98 % 11/24/20 1101  91 23  99 % 11/24/20 1100  91 21  99 % 11/24/20 1059  91 22  99 % 11/24/20 1058  90 25  99 % 11/24/20 1057  91 25  98 % 11/24/20 1056  90 14  98 % 11/24/20 1055  91 22  98 % 11/24/20 1054  91 18  98 % 11/24/20 1053  91 19  99 % 11/24/20 1052  91 17  99 % 11/24/20 1051  91 19  98 % 11/24/20 1050  91 19  98 % 11/24/20 1049  91 20  97 % 11/24/20 1048  91 19  99 % 11/24/20 1047  91 19  98 % 11/24/20 1046  91 19  98 % 11/24/20 1045  91 17  99 % 11/24/20 1044  90 21  98 % 11/24/20 1043  91 20  98 % 11/24/20 1042  91 17  98 % 11/24/20 1041  90 18  98 % 11/24/20 1040  90 21  98 % 11/24/20 1039  91 24  98 % 11/24/20 1038  91 13  98 % 11/24/20 1037  91 19  99 % 11/24/20 1036  91 19  99 % 11/24/20 1035  91 19  99 % 11/24/20 1034  91 18  99 % 11/24/20 1033  91 19  98 % 11/24/20 1032  91 22  98 % 11/24/20 1031  91 19  99 % 11/24/20 1030  91 19  99 % 11/24/20 1029  91 21  98 % 11/24/20 1028  91 16  99 % 11/24/20 1027  91 23  99 % 11/24/20 1026  91 22  98 % 11/24/20 1025  91 19  98 % 11/24/20 1024  91 22  99 % 11/24/20 1023  91 19  99 % 11/24/20 1022  91 18  99 % 11/24/20 1021  91 19  98 % 11/24/20 1020  90 22  98 % 11/24/20 1019  91 19  98 % 11/24/20 1018  90 19  97 % 11/24/20 1017  91 20  99 % 11/24/20 1016  91 17  99 % 11/24/20 1015  90 18  99 % 11/24/20 1014  91 19  98 % 11/24/20 1013  91 18  98 % 11/24/20 1012  91 20  99 % 11/24/20 1011  91 18  98 % 11/24/20 1010  90 17  98 % 11/24/20 1009  91 17  99 % 11/24/20 1008  90 19  99 % 11/24/20 1007  91 15  99 % 11/24/20 1006  91 17  99 % 11/24/20 1005  91 20  100 % 11/24/20 1004  90 23  98 % 11/24/20 1003  90 23  98 % 11/24/20 1002  90 18  98 % 11/24/20 1001  91 19  96 % 11/24/20 1000  91 18 (!) 170/111 98 % 11/24/20 0959  90 17  99 % 11/24/20 0958  91 17  98 % 11/24/20 0957  90 19  98 % 11/24/20 0956  91 17  98 % 11/24/20 0955  90 19  97 % 11/24/20 0954  91 17  98 % 11/24/20 0953  90 14  98 % 11/24/20 0952  91 19  97 % 11/24/20 0951  90 20  98 % 11/24/20 0950  91 17  98 % 11/24/20 0949  90 17  98 % 11/24/20 0948  91 18  97 % 11/24/20 0947  90 19  98 % 11/24/20 0946  90 19  98 % 11/24/20 0945  90 18  98 % 11/24/20 0944  90 19  98 % 11/24/20 0943  91 18  98 % 11/24/20 0942  91 20  99 % 11/24/20 0941  91 20  98 % 11/24/20 0940  90 22  99 % 11/24/20 0939  90 17  99 % 11/24/20 0938  90 21  99 % 11/24/20 0937  90 25  98 % 11/24/20 0936  90 19  98 % 11/24/20 0935  90 18  97 % 11/24/20 0934  91 17  96 % 11/24/20 0933  90 17  97 % 11/24/20 0932  90 17  98 % 11/24/20 0931  90 19  96 % 11/24/20 0930  90 17  97 % 11/24/20 0929  90 17  98 % 11/24/20 0928  90 18  97 % 11/24/20 0927  90 19  96 % 11/24/20 0926  90 18  97 % 11/24/20 0925  91 18  98 % 11/24/20 0924  90 18  98 % 11/24/20 0923  90 19  96 % 11/24/20 0922  90 19  98 % 11/24/20 0921  90 19  98 % 11/24/20 0920  90 19  98 % 11/24/20 0919  91 21  99 % 11/24/20 0918  90 26  98 % 11/24/20 0917  90 18  99 % 11/24/20 0916  90 18  98 % 11/24/20 0915  91 20  99 % 11/24/20 0914  90 23  97 % 11/24/20 0913  90 19  98 % 11/24/20 0912  90 20  97 % 11/24/20 0911  90 20  97 % 11/24/20 0910  90 19  98 % 11/24/20 0909  90 17  98 % 11/24/20 0908  91 19  98 % 11/24/20 0907  90 19  98 % 11/24/20 0906  91 20  98 % 11/24/20 0905  91 18  98 % 11/24/20 0904  90 18  98 % 11/24/20 0903  90 20  97 % 11/24/20 0902  90 18  99 % 11/24/20 0901  90 20  99 % 11/24/20 0900  90 19  98 % 11/24/20 0800  91 21 (!) 173/95 99 % 11/24/20 0700  92 19  98 % 11/24/20 0500  92 24 (!) 167/84 98 % 11/24/20 0400  90 12 (!) 163/86 98 % 11/24/20 0230  90 19 (!) 164/96 98 % 11/24/20 0200  90 18 (!) 160/89 97 % 11/24/20 0159 98 °F (36.7 °C)      
11/24/20 0130  90 17 (!) 158/86 97 % 11/24/20 0000  90 21 (!) 153/86 96 % 11/23/20 2330  90 22 (!) 155/86 96 % 11/23/20 2300  90 21 (!) 166/87 96 % 11/23/20 2230  90 19 (!) 162/88 97 % 11/23/20 2200  91 18 (!) 153/86 97 % 11/23/20 2130  90 20 (!) 162/84 97 % 11/23/20 2100  90 15 (!) 159/86 98 % 11/23/20 2030  90 20 (!) 169/92 99 % Intake/Output Summary (Last 24 hours) at 11/24/2020 2024 Last data filed at 11/24/2020 1745 Gross per 24 hour Intake 120 ml Output  Net 120 ml Last shift: 
  No intake/output data recorded. Last 3 shifts: 
  11/23 0701 - 11/24 1900 In: 120 [P.O.:120] Out: - General:   Alert, cooperative, no acute distress Head:   Atraumatic Eyes:   Conjunctivae clear ENT:  Oral mucosa normal  
 Neck:  Supple, trachea midline, no adenopathy No JVD Back:    No CVA tenderness Chest wall:    No tenderness or deformities Lungs:   Clear to auscultation bilaterally Heart:   Regular rhythm, no murmur Abdomen:    Soft, non-tender No masses or organomegaly Extremities:  No edema or DVT signs Pulses:  Symmetric all extremities Skin:  Warm and dry No rashes or lesions Neurologic:  Oriented No focal deficits Psychiatric:   
   
 
Data Review:  
 
 
Recent Labs  
  11/24/20 
0454 11/23/20 
1426 WBC 6.0 6.5 HGB 7.7* 8.3* HCT 26.3* 27.8*  260 Recent Labs  
  11/24/20 
0454 11/23/20 2222 11/23/20 
1426   --  134* K 4.2  --  4.5  
*  --  107 CO2 18*  --  17* *  --  162* BUN 23*  --  23* CREA 1.93*  --  2.17* CA 8.4*  --  9.0 MG 2.3  --  2.4 ALB  --   --  3.4* ALT  --   --  23 INR  --  1.9*  -- No results for input(s): PH, PCO2, PO2, HCO3, FIO2 in the last 72 hours. Lab Results Component Value Date/Time Glucose 135 (H) 11/24/2020 04:54 AM  
  
 
All Micro Results Procedure Component Value Units Date/Time CULTURE, URINE [[id number]]  (Abnormal) Collected:  11/23/20 1936 Order Status:  Completed Specimen:  Urine from Clean catch Updated:  11/24/20 1318 Special Requests: NO SPECIAL REQUESTS Sidney Count --     
  >100,000 COLONIES/mL Culture result: GRAM NEGATIVE RODS     
 SARS-COV-2, PCR [[id number]] Collected:  11/23/20 2222 Order Status:  Completed Specimen:  Nasopharyngeal Updated:  11/24/20 1018 Specimen source Nasopharyngeal     
  SARS-CoV-2 Not detected Comment:     
The specimen is NEGATIVE for SARS-CoV2, the novel coronavirus associated with COVID-19. A negative result does not rule out COVID-19. This test has been authorized by the FDA under an Emergency Use Authorization (EUA) for use by authorized laboratories.   
    
Fact sheet for Healthcare Providers: kstattoo.com Fact sheet for Patients: https://fda.gov/media/839006/download Methodology: RT-PCR 
  
  
 CULTURE, BLOOD, PAIRED [[id number]] Collected:  11/23/20 2222 Order Status:  Completed Specimen:  Blood Updated:  11/24/20 0522 Special Requests: NO SPECIAL REQUESTS Culture result: NO GROWTH AFTER 4 HOURS     
 CULTURE, URINE [[id number]] Order Status:  Canceled Specimen:  Cath Urine URINE CULTURE HOLD SAMPLE [[id number]] Collected:  11/23/20 1936 Order Status:  Completed Specimen:  Urine from Serum Updated:  11/23/20 1941 Urine culture hold Urine on hold in Microbiology dept for 2 days. If unpreserved urine is submitted, it cannot be used for addtional testing after 24 hours, recollection will be required. Lab Results Component Value Date/Time Specimen Description: RENEE 04/09/2014 12:27 PM  
 Specimen Description: [facility] 10/28/2013 05:00 PM  
 Specimen Description: [facility] 10/28/2013 03:09 PM  
 
Lab Results Component Value Date/Time Culture result: NO GROWTH AFTER 4 HOURS 11/23/2020 10:22 PM  
 Culture result: GRAM NEGATIVE RODS (A) 11/23/2020 07:36 PM  
 Culture result: NO FUNGUS ISOLATED 28 DAYS 08/24/2020 01:28 PM  
 Culture result: LIGHT STAPHYLOCOCCUS AUREUS (A) 08/24/2020 01:28 PM  
 
Medications reviewed Current Facility-Administered Medications Medication Dose Route Frequency  vancomycin - pharmacy to dose   Other Rx Dosing/Monitoring  [START ON 11/25/2020] vancomycin (VANCOCIN) 1250 mg in  ml infusion  1,250 mg IntraVENous Q18H  
 metoprolol (LOPRESSOR) injection 5 mg  5 mg IntraVENous Q3H  
 sodium chloride (NS) flush 5-40 mL  5-40 mL IntraVENous Q8H  
 sodium chloride (NS) flush 5-40 mL  5-40 mL IntraVENous PRN  
 oxyCODONE-acetaminophen (PERCOCET) 5-325 mg per tablet 2 Tab  2 Tab Oral Q6H PRN  
 HYDROmorphone (PF) (DILAUDID) injection 0.5 mg  0.5 mg IntraVENous Q4H PRN Signed: Nadira Johnston MD 
 Internist / Hospitalist

## 2020-11-25 NOTE — ANESTHESIA POSTPROCEDURE EVALUATION
Post-Anesthesia Evaluation and Assessment Patient: Spencer Singer MRN: [id number]  SSN: [id number] YOB: 1960  Age: 63 y.o. Sex: female I have evaluated the patient and they are stable and ready for discharge from the PACU. Cardiovascular Function/Vital Signs Visit Vitals BP (!) 151/85 (BP 1 Location: Left arm, BP Patient Position: At rest) Pulse 91 Temp 36.5 °C (97.7 °F) Resp 16 Wt 117.1 kg (258 lb 2.5 oz) SpO2 98% BMI 37.04 kg/m² Patient is status post General anesthesia for Procedure(s): EXPLORATION OF RIGHT CHEST PHLEGMON. Nausea/Vomiting: None Postoperative hydration reviewed and adequate. Pain: 
Pain Scale 1: Numeric (0 - 10) (11/25/20 1120) Pain Intensity 1: 0 (11/25/20 1120) Managed Neurological Status:  
Neuro (WDL): Within Defined Limits (11/25/20 1033) Neuro Neurologic State: Sleeping;Eyes open spontaneously; Eyes open to voice (11/25/20 1033) LUE Motor Response: Purposeful (11/25/20 1033) LLE Motor Response: Purposeful (11/25/20 1033) RUE Motor Response: Purposeful (11/25/20 1033) RLE Motor Response: Purposeful (11/25/20 1033) At baseline Mental Status, Level of Consciousness: Alert and  oriented to person, place, and time Pulmonary Status:  
O2 Device: Room air (11/25/20 1120) Adequate oxygenation and airway patent Complications related to anesthesia: None Post-anesthesia assessment completed. No concerns Signed By: Kavita Swain MD   
 November 25, 2020 Procedure(s): EXPLORATION OF RIGHT CHEST PHLEGMON. general 
 
<BSHSIANPOST> INITIAL Post-op Vital signs:  
Vitals Value Taken Time /76 11/25/2020 10:45 AM  
Temp 36.7 °C (98 °F) 11/25/2020  9:40 AM  
Pulse 92 11/25/2020 10:55 AM  
Resp 13 11/25/2020 10:55 AM  
SpO2 99 % 11/25/2020 10:55 AM  
Vitals shown include unvalidated device data.

## 2020-11-25 NOTE — OP NOTES
[address]  
OPERATIVE REPORT Name:  Philip Owen 
MR#:  [id number] :  1960 ACCOUNT #:  [[id number]] DATE OF SERVICE:  2020 CLINICAL SERVICE:  Thoracic Surgery ATTENDING SURGEON:  Nikhil Haley MD 
 
OPERATION PERFORMED:  Exploration of right anterior chest wall wound bed. PREOPERATIVE DIAGNOSIS:  Possible abscess of right anterior chest wall wound. POSTOPERATIVE DIAGNOSIS:  Routine scar tissue and muscle filling wound bed. DRAINS AND TUBES:  None. SPECIMENS SENT:  None. ANESTHESIA:  General with endotracheal intubation. ESTIMATED BLOOD LOSS:  For this case was minimal. 
 
ASSISTANT:  Aylin Valdez. INDICATIONS FOR PROCEDURE:  The patient is a 69-year-old lady who in 2020 had a resection of her right sternoclavicular joint for a right sternoclavicular joint abscess. She recovered well postoperatively and had been followed in clinic. She then re-presented to the ER with multiple infections and other medical comorbidities and was noted to have swelling over the prior surgery site. Chest CT was indicative of a possible infection versus hematoma versus seroma and the decision was made to explore the wound out of concern for ongoing infection. PROCEDURE IN DETAIL:  After informed consent was obtained and placed on the chart, the patient was taken to the operating room and placed supine on the table. General anesthesia with endotracheal intubation was induced without complication. Preoperative antibiotics were administered. The patient's right anterior chest was prepped and draped in sterile fashion. Time-out was performed. Part of her previous incision overlying her right clavicular bed was reopened. Dissection was carried down to the level of the clavicle. There was no fluid or hematoma encountered. No infection was encountered.   The entire wound bed had been filled in with healthy scar tissue as well as muscle. We did a thorough exploration of the wound; again no infection or fluid was found. The wound was then irrigated, closed in a multilayer fashion and covered with Dermabond. Approximately 30 mL of 1% lidocaine mixed with 0.25% Marcaine was used as local anesthetic. The patient was then reversed from general anesthesia, extubated, and taken to PACU in stable condition. All surgical counts were correct x2 at the end of the case. There were no immediate complications identified during this case. Dr. Nathan Calzada was present and scrubbed throughout the entire procedure. Helga Victoria MD 
 
 
[id number] 
D:  11/25/2020 10:05 
T:  11/25/2020 14:12 
JOB #:  [id number]

## 2020-11-25 NOTE — PERIOP NOTES
Patient: Shirin Ferguson MRN: [id number]  SSN: [id number] YOB: 1960  Age: 63 y.o. Sex: female Patient is status post Procedure(s): EXPLORATION OF RIGHT CHEST PHLEGMON. Surgeon(s) and Role: Sandra Roberts MD - Primary Local/Dose/Irrigation:  SEE MAR Venous Access Device Proline CT with cuff 09/08/20 (Active) Peripheral IV 11/23/20 Left Wrist (Active) Site Assessment Clean, dry, & intact 11/24/20 1621 Phlebitis Assessment 0 11/24/20 1621 Infiltration Assessment 0 11/24/20 1621 Dressing Status Clean, dry, & intact 11/24/20 1621 Dressing Type Transparent 11/24/20 1621 Hub Color/Line Status Pink;Capped 11/24/20 1621 Action Taken Open ports on tubing capped 11/24/20 1236 Alcohol Cap Used Yes 11/24/20 1621 Peripheral IV 11/23/20 Left Forearm (Active) Site Assessment Clean, dry, & intact 11/24/20 1621 Phlebitis Assessment 0 11/24/20 1621 Infiltration Assessment 0 11/24/20 1621 Dressing Status Clean, dry, & intact 11/24/20 1621 Dressing Type Transparent 11/24/20 1621 Hub Color/Line Status Capped 11/24/20 1621 Action Taken Open ports on tubing capped 11/24/20 1236 Alcohol Cap Used Yes 11/24/20 1621 Airway - Endotracheal Tube 11/25/20 Oral (Active) Dressing/Packing:  Wound Back Right-Dressing/Treatment: Surgical glue (11/25/20 0900) Splint/Cast:  ] Other:   
 
.

## 2020-11-25 NOTE — PROGRESS NOTES
Hospitalist Service Progress Note Daily Progress Note: 11/25/2020 This is a 58-year-old female who presented to the ED with right-sided chest pain on 12/23. Significant PMH: Chronic atrial fibrillation on warfarin, bilateral breast cancer, CKD, type 2 diabetes, PAD status post left BKA, recent bacteremia with right clavicular osteomyelitis. Subjectively 
-Patient had exploration of the right clavicule earlier today, no evidence of infection per thoracic surgery. 
-She feels pain is better. 
-She has a right breast lump she noted recently. She has not had breast imaging for some time now. -She denies fever, chills, cough, nausea, vomiting or diarrhea. Assessment/Plan:  
#Chest Pain 
-There was concern about R clavicular fluid collection considering recent clavicular resection/surgery osteomyelitis 
-She underwent exploration today. Discussed with thoracic surgeon Dr. Adeel Gupta, negative for abscess or evidence of infection who cleared her for discharge from a thoracic point of view. No other evidence of infection thus I have discontinued vancomycin #Anasarca. Elevated proBNP. Mild elevated troponin 
-Start Bumex 1 mg IV. She is on 0.5 mg Bumex twice daily at home. -TTE on 8/2020 showed normal EF and diastolic function 
-Obtain echocardiogram 
-Cardiology consult #Right breast lump. This is concerning given the patient's history of breast cancer. 
-I will ask for breast ultrasound if it could be done here otherwise I have advised patient that she needs mammogram or MRI as outpatient ASAP. 
  
#Parox A-fib s/p AV node ablation -INR 1.9 
-Resume warfarin, will ask pharmacy to dose and monitor. 
  
#Normocytic Anemia 
-H/H 8.3/27.8 
-slightly lower than b/l 9 
-monitor 
  
#Stage IV CKD with non-AG acidosis 
-Bun/creat 23/2.17, K+ 4.5, CO2 17, AG 10 
 
  
Dvt ppx: on coumadin FUNCTIONAL STATUS PRIOR TO HOSPITALIZATION Ambulatory with Use of Assistive Anticipated disposition: Home Review of Systems:  
Review of Systems Constitutional: Negative for chills and fever. HENT: Negative for congestion and sore throat. Eyes: Negative for blurred vision. Respiratory: Negative for cough, hemoptysis and sputum production. Cardiovascular: Positive for leg swelling. Negative for chest pain, palpitations, orthopnea and claudication. Gastrointestinal: Negative for abdominal pain, blood in stool, constipation, diarrhea, heartburn, melena, nausea and vomiting. Genitourinary: Negative for dysuria, flank pain, frequency, hematuria and urgency. Musculoskeletal: Negative for back pain, falls, joint pain, myalgias and neck pain. Skin: Negative for itching. Neurological: Negative for dizziness, tremors, sensory change, speech change, focal weakness and headaches. Psychiatric/Behavioral: Negative for depression and memory loss. Objective:  
Physical examination Visit Vitals BP (!) 132/56 (BP 1 Location: Left arm, BP Patient Position: At rest) Pulse 91 Temp 98.1 °F (36.7 °C) Resp 16 Wt 117.1 kg (258 lb 2.5 oz) SpO2 100% BMI 37.04 kg/m² Temp (24hrs), Av °F (36.7 °C), Min:97.7 °F (36.5 °C), Max:98.3 °F (36.8 °C) O2 Device: Room air Patient Vitals for the past 24 hrs: 
 Temp Pulse Resp BP SpO2  
20 1606 98.1 °F (36.7 °C) 91 16 (!) 132/56 100 % 20 1500  91  (!) 154/73   
20 1120 97.7 °F (36.5 °C) 91 16 (!) 151/85 98 % 20 1040  91 10  97 % 20 1035  91 10  96 % 20 1030  91 11 126/70 97 % 20 1025  91 11  97 % 20 1020  91 10  97 % 20 1015  91 10 130/69 98 % 20 1010  91 12  96 % 20 1005  92 11 128/68 95 % 20 1000  92 13 123/70 96 % 20 0955  92 14 126/64 96 % 20 0950  91 22 128/69 96 % 20 0945  91 15 113/71 97 % 20 0940 98 °F (36.7 °C) 91 14 113/71 95 % 11/25/20 0809  91 16 (!) 158/82 99 % 11/25/20 0500  90  (!) 144/73   
11/25/20 0456 98 °F (36.7 °C) 90 18 (!) 150/77 98 % 11/25/20 0200 97.8 °F (36.6 °C) 90 18 (!) 177/86 98 % 11/24/20 2357 97.7 °F (36.5 °C) 91 16 (!) 150/90 98 % 11/24/20 2308 98 °F (36.7 °C) 90 16 (!) 153/89 98 % 11/24/20 2300  91  (!) 150/90   
11/24/20 2107  90 18 (!) 167/90 99 % 11/24/20 2002 98.3 °F (36.8 °C) 90 16 (!) 159/93 100 % 11/24/20 1722    (!) 156/88  Intake/Output Summary (Last 24 hours) at 11/25/2020 1654 Last data filed at 11/25/2020 3959 Gross per 24 hour Intake 420 ml Output 1 ml Net 419 ml Last shift: 
  11/25 0701 - 11/25 1900 In: 300 [I.V.:300] Out: 1 Last 3 shifts: 
  11/23 1901 - 11/25 0700 In: 120 [P.O.:120] Out: -  
I have seen and examined patient in a face-to-face encounter today. 11/25/20 GENERAL:  Patient is lying in bed comfortably. Alert oriented x4. She is not in any distress. HEENT:  Normocephalic, atraumatic, non icteric sclerae, non pallor conjuctivae, EOMs intact, PERRLA. Firm, nontender right breast lump NECK: Supple, trachea midline, no adenopathy, no thyromegally or tenderness, no carotid bruit and no JVD. LUNGS:   Patient observed on room air without any respiratory distress. Vesicular breath sounds bilaterally, no added sounds. HEART:   S1 and S2 well heard,RRR,  no murmur, click, rub or gallop. ABDOMEB:   Soft, non-tender. Normoactive bowel sounds. No masses,  No organomegaly. EXTREMETIES: Old left BKA.+ Right leg PULSES: 2+ and symmetric all extremities. SKIN:  No rashes or lesions NEUROLOGY: Alert and oriented to PPT, CNII-XII intact. Motor and sensory exam grossly intact. Data Review:  
 
 
Recent Labs  
  11/24/20 
0454 11/23/20 
1426 WBC 6.0 6.5 HGB 7.7* 8.3* HCT 26.3* 27.8*  260 Recent Labs  
  11/24/20 
0454 11/23/20 
2222 11/23/20 
1426   --  134* K 4.2  --  4.5  
*  --  107 CO2 18*  --  17* *  --  162* BUN 23*  --  23* CREA 1.93*  --  2.17* CA 8.4*  --  9.0 MG 2.3  --  2.4 ALB  --   --  3.4* ALT  --   --  23 INR  --  1.9*  -- No results for input(s): PH, PCO2, PO2, HCO3, FIO2 in the last 72 hours. Lab Results Component Value Date/Time Glucose 135 (H) 11/24/2020 04:54 AM  
  
 
All Micro Results Procedure Component Value Units Date/Time CULTURE, BLOOD, PAIRED [[id number]] Collected:  11/23/20 2222 Order Status:  Completed Specimen:  Blood Updated:  11/25/20 1119 Special Requests: NO SPECIAL REQUESTS Culture result: NO GROWTH 1 DAY     
 CULTURE, URINE [[id number]]  (Abnormal) Collected:  11/23/20 1936 Order Status:  Completed Specimen:  Urine from Clean catch Updated:  11/25/20 4397 Special Requests: NO SPECIAL REQUESTS [place] Count --     
  >100,000 COLONIES/mL Culture result:    
  GRAM NEGATIVE RODS IDENTIFICATION AND SUSCEPTIBILITY TO FOLLOW  
     
 SARS-COV-2, PCR [[id number]] Collected:  11/23/20 2222 Order Status:  Completed Specimen:  Nasopharyngeal Updated:  11/24/20 1018 Specimen source Nasopharyngeal     
  SARS-CoV-2 Not detected Comment:     
The specimen is NEGATIVE for SARS-CoV2, the novel coronavirus associated with COVID-19. A negative result does not rule out COVID-19. This test has been authorized by the FDA under an Emergency Use Authorization (EUA) for use by authorized laboratories. Fact sheet for Healthcare Providers: kstattoo.com Fact sheet for Patients: https://fda.gov/media/200202/download Methodology: RT-PCR 
  
  
 CULTURE, URINE [[id number]] Order Status:  Canceled Specimen:  Cath Urine URINE CULTURE HOLD SAMPLE [[id number]] Collected:  11/23/20 1936 Order Status:  Completed Specimen:  Urine from Serum Updated:  11/23/20 1941 Urine culture hold Urine on hold in Microbiology dept for 2 days.   If unpreserved urine is submitted, it cannot be used for addtional testing after 24 hours, recollection will be required. Lab Results Component Value Date/Time Specimen Description: RENEE 04/09/2014 12:27 PM  
 Specimen Description: RENEE 10/28/2013 05:00 PM  
 Specimen Description: [facility] 10/28/2013 03:09 PM  
 
Lab Results Component Value Date/Time Culture result: NO GROWTH 1 DAY 11/23/2020 10:22 PM  
 Culture result: (A) 11/23/2020 07:36 PM  
  GRAM NEGATIVE RODS IDENTIFICATION AND SUSCEPTIBILITY TO FOLLOW Culture result: NO FUNGUS ISOLATED 28 DAYS 08/24/2020 01:28 PM  
 Culture result: LIGHT STAPHYLOCOCCUS AUREUS (A) 08/24/2020 01:28 PM  
 
Medications reviewed Current Facility-Administered Medications Medication Dose Route Frequency  bumetanide (BUMEX) injection 1 mg  1 mg IntraVENous DAILY  sodium chloride (NS) flush 5-40 mL  5-40 mL IntraVENous Q8H  
 sodium chloride (NS) flush 5-40 mL  5-40 mL IntraVENous PRN  
 oxyCODONE-acetaminophen (PERCOCET) 5-325 mg per tablet 2 Tab  2 Tab Oral Q6H PRN  
 HYDROmorphone (PF) (DILAUDID) injection 0.5 mg  0.5 mg IntraVENous Q4H PRN Signed:  
 Rocío Arciniega MD 
 Internist / Hospitalist

## 2020-11-25 NOTE — ANESTHESIA PREPROCEDURE EVALUATION
Relevant Problems No relevant active problems Anesthetic History PONV Review of Systems / Medical History Patient summary reviewed, nursing notes reviewed and pertinent labs reviewed Pulmonary Asthma Neuro/Psych Within defined limits Cardiovascular Hypertension Dysrhythmias : atrial fibrillation and atrial flutter Pacemaker GI/Hepatic/Renal 
  
 
 
Renal disease: CRI Endo/Other Diabetes: type 2 Obesity, arthritis and cancer Pertinent negatives: No morbid obesity Other Findings Physical Exam 
 
Airway Mallampati: II 
TM Distance: > 6 cm Neck ROM: normal range of motion Mouth opening: Normal 
 
 Cardiovascular Regular rate and rhythm,  S1 and S2 normal,  no murmur, click, rub, or gallop Dental 
No notable dental hx Pulmonary Breath sounds clear to auscultation Abdominal 
GI exam deferred Other Findings Anesthetic Plan ASA: 3 Anesthesia type: general 
 
 
 
 
Induction: Intravenous Anesthetic plan and risks discussed with: Patient

## 2020-11-25 NOTE — BRIEF OP NOTE
Brief Postoperative Note Patient: Morales Barron YOB: 1960 MRN: [id number] Date of Procedure: 11/25/2020 Pre-Op Diagnosis: RIGHT CHEST WALL ABCESS Post-Op Diagnosis: scar and muscle tissue filling the defect from previous surgery Procedure(s): EXPLORATION OF RIGHT Chest wall wound bed Surgeon(s): 
Madalyn Cartwright MD 
 
Surgical Assistant: Surg Asst-1: Jessica Reyes Anesthesia: General  
 
Estimated Blood Loss (mL): Minimal 
 
Complications: None Specimens: * No specimens in log * Implants: * No implants in log * Drains:  
Emeli Neer #1 08/24/20 Right;Mid Chest (Removed) Eemli Neer #2 08/24/20 Right; Outer;Mid Chest (Removed) [REMOVED] External Female Catheter 08/21/20 (Removed) [REMOVED] External Female Catheter 09/02/20 (Removed) Findings: No evidence of infection, seroma or hematoma. The bulge was scar tissue and muscle that had filled the defect Condition: stable Disposition: to pacu Electronically Signed by Siri Brown MD on 11/25/2020 at 9:28 AM

## 2020-11-25 NOTE — PROGRESS NOTES
Pharmacist Note  Warfarin Dosing Consult provided for this 63 y. o.female to manage warfarin for Atrial Fibrillation INR Goal: 2 - 3 Home regimen/ tablet size: 2 mg M-W-F; 1 mg Tu/Th/Sat/ Sun 
 
Drugs that may increase INR: None Drugs that may decrease INR: None Other current anticoagulants/ drugs that may increase bleeding risk: None Risk factors: None Daily INR ordered: YES Recent Labs  
  11/24/20 
0454 11/23/20 
2222 11/23/20 
1426 HGB 7.7*  --  8.3* INR  --  1.9*  --   
 
Date               INR                  Dose 
11/23  1.9  Held 
11/24                --                    Held 
11/25               --                     2 mg Assessment/ Plan: Will order warfarin 2 mg PO x 1 dose. Pharmacy will continue to monitor daily and adjust therapy as indicated. Please contact the pharmacist at  for outpatient recommendations if needed.

## 2020-11-25 NOTE — PROGRESS NOTES
Problem: Risk for Spread of Infection Goal: Prevent transmission of infectious organism to others Description: Prevent the transmission of infectious organisms to other patients, staff members, and visitors. Outcome: Progressing Towards Goal 
  
Problem: Falls - Risk of 
Goal: *Absence of Falls Description: Document Kerri Murrieta Fall Risk and appropriate interventions in the flowsheet. Outcome: Progressing Towards Goal 
Note: Fall Risk Interventions: 
Mobility Interventions: Assess mobility with egress test, Communicate number of staff needed for ambulation/transfer, OT consult for ADLs, Patient to call before getting OOB, PT Consult for mobility concerns, PT Consult for assist device competence, Strengthening exercises (ROM-active/passive), Utilize walker, cane, or other assistive device, Utilize gait belt for transfers/ambulation Medication Interventions: Assess postural VS orthostatic hypotension, Evaluate medications/consider consulting pharmacy, Patient to call before getting OOB, Teach patient to arise slowly, Utilize gait belt for transfers/ambulation Elimination Interventions: Call light in reach, Patient to call for help with toileting needs, Stay With Me (per policy), Toilet paper/wipes in reach, Toileting schedule/hourly rounds

## 2020-11-25 NOTE — PROGRESS NOTES
11/25/2020 -  
TEE: 
- RUR: 32% 
- Disposition is TBD dependent on progression - potential discharge to own home with transport via spouse - Patient is S/P exploration of right chest wall wound bed 11/25 
- ABX continue This CM is familiar with patient from previous hospitalization 11/17-11/18 for OBS admission, sick sinus syndrome. Patient has multiple pieces of DME and lives with a supportive spouse in a single story home. CRM: Aziza Lozano, MPH, [address] [place] [address]; Z: [phone]

## 2020-11-26 NOTE — PROGRESS NOTES
Cardiology Progress Note 
    
 
11/26/2020 10:51 AM 
 
Admit Date: 11/23/2020 Admit Diagnosis: Chest pain [R07.9] Subjective: Is seen today for Dr Shahid Perry still has chest pain right side of sternum Pocket on right chest is draining and painful 8/2020 resection of the right sternoclavicular joint 08/20/20 ECHO ADULT COMPLETE 08/28/2020 8/28/2020 Narrative · LV: Estimated LVEF is 55 - 60%. Visually measured ejection fraction. Normal cavity size and systolic function (ejection fraction normal). Mild  
concentric hypertrophy. Wall motion: normal. Normal left ventricular  
strain. · LA: Moderately dilated left atrium. · AV: Aortic valve leaflet calcification present without reduced  
excursion. · MV: Mitral valve non-specific thickening. Signed by: Shayan Seymour MD  
 
 
 
Past Medical History:  
Diagnosis Date  Acquired lymphedema Right arm  Arrhythmia  Asthma  Atrial fibrillation ([address] Dr. Patrick Shah and Dr. Doreen Hester [facility])  Cancer ([address] bilateral breast  
 Chronic kidney disease  Diabetes mellitus type II   
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  
Procedure Laterality Date  ABDOMEN SURGERY PROC UNLISTED    
 gastric bypass  GASTRIC BYPASS,OBESE<150CM SAW-EN-Y  7/08  
 [facility]  HX AFIB ABLATION    
 HX AMPUTATION FOOT Left 04/2020  HX BREAST RECONSTRUCTION Bilateral   
  HX CARPAL TUNNEL RELEASE [address] [facility] [address] RIGHT MODIFIED RADICAL   
 HX MASTECTOMY Left   
 no lymphnode removal  
 HX MOHS PROCEDURES  1995  
 right  HX ORTHOPAEDIC Right   
 knee surgery  HX PACEMAKER  11/17/2020 Dr. Skinny Decker. Insertion of permanent pacemaker. AV node ablation  HX PACEMAKER    
 IR INSERT TUNL CVC W PORT OVER 5 YEARS    
 IR INSERT TUNL CVC W/O PORT OVER 5 YR  5/4/2020  IR INSERT TUNL CVC W/O PORT OVER 5 YR  9/8/2020  IR REMOVE TUNL CVAD W/O PORT / PUMP  6/26/2020  IR REMOVE TUNL CVAD W/O PORT / PUMP  10/19/2020  [address]  8.21.07  
 [place] [address] AND [place]  [place] RELOCATION OF SKIN POCKET FOR PACEMAKER N/A 4/24/2020 RELOCATE 2 [facility] performed by Giovana Adler MD at [address] [address] CATH LAB  [place] RMVL TRANSVNS PM ELTRD 1 LEAD SYS ATR/VENTR N/A 4/24/2020 Remove Pacemaker Dual Leads performed by Giovana Adler MD at [facility] CATH LAB  [place] RMVL TRANSVNS PM ELTRD 1 LEAD SYS ATR/VENTR N/A 4/27/2020 REMOVE PACEMAKER DUAL LEADS performed by Giovana Adler MD at [address] [facility] 28 CATH LAB  [place] TRABECULOPLASTY BY LASER SURGERY    
 US GUIDE ASP OVARIAN CYST ABD APPROACH  8.21.07  
 RIGHT Social History Socioeconomic History  Marital status:  Spouse name: Not on file  Number of children: Not on file  Years of education: Not on file  Highest education level: Not on file Occupational History  Not on file Social Needs  Financial resource strain: Not on file  Food insecurity Worry: Not on file Inability: Not on file  Transportation needs Medical: Not on file Non-medical: Not on file Tobacco Use  Smoking status: Never Smoker  Smokeless tobacco: Never Used Substance and Sexual Activity  Alcohol use: Not Currently  Drug use: No  
 Sexual activity: Not on file Lifestyle  Physical activity Days per week: Not on file Minutes per session: Not on file  Stress: Not on file Relationships  Social connections Talks on phone: Not on file Gets together: Not on file Attends Baptism service: Not on file Active member of club or organization: Not on file Attends meetings of clubs or organizations: Not on file Relationship status: Not on file  Intimate partner violence Fear of current or ex partner: Not on file Emotionally abused: Not on file Physically abused: Not on file Forced sexual activity: Not on file Other Topics Concern  Not on file Social History Narrative  Not on file Current Facility-Administered Medications Medication Dose Route Frequency  warfarin (COUMADIN) tablet 1 mg  1 mg Oral ONCE  Warfarin - pharmacy to dose   Other Rx Dosing/Monitoring  bumetanide (BUMEX) injection 1 mg  1 mg IntraVENous BID  hydrALAZINE (APRESOLINE) tablet 100 mg  100 mg Oral TID  metoprolol tartrate (LOPRESSOR) tablet 25 mg  25 mg Oral BID  sodium chloride (NS) flush 5-40 mL  5-40 mL IntraVENous Q8H  
 sodium chloride (NS) flush 5-40 mL  5-40 mL IntraVENous PRN  
 oxyCODONE-acetaminophen (PERCOCET) 5-325 mg per tablet 2 Tab  2 Tab Oral Q6H PRN  
 HYDROmorphone (PF) (DILAUDID) injection 0.5 mg  0.5 mg IntraVENous Q4H PRN Objective:  
  
Physical Exam: 
Visit Vitals /64 (BP 1 Location: Left arm, BP Patient Position: At rest) Pulse 92 Temp 98.2 °F (36.8 °C) Resp 18 Wt 265 lb 6.9 oz (120.4 kg) SpO2 96% BMI 38.09 kg/m² General Appearance:  Well developed, well nourished,alert and oriented x 3, and individual in no acute distress. Ears/Nose/Mouth/Throat:   Hearing grossly normal. 
  
    Neck: Supple. Chest:   Lungs clear to auscultation bilaterally. Cardiovascular:  Regular rhythm, no murmur. Abdomen:   Soft, obese Extremities: Left BKA Skin: Right pocket swelling and red and draining on dressing, tender Data Review:  
Labs:   
Recent Results (from the past 24 hour(s)) METABOLIC PANEL, BASIC Collection Time: 11/26/20  4:39 AM  
Result Value Ref Range Sodium 137 136 - 145 mmol/L Potassium 3.7 3.5 - 5.1 mmol/L Chloride 109 (H) 97 - 108 mmol/L  
 CO2 22 21 - 32 mmol/L Anion gap 6 5 - 15 mmol/L Glucose 138 (H) 65 - 100 mg/dL BUN 20 6 - 20 MG/DL Creatinine 1.85 (H) 0.55 - 1.02 MG/DL  
 BUN/Creatinine ratio 11 (L) 12 - 20 GFR est AA 34 (L) >60 ml/min/1.73m2 GFR est non-AA 28 (L) >60 ml/min/1.73m2 Calcium 8.4 (L) 8.5 - 10.1 MG/DL PROTHROMBIN TIME + INR Collection Time: 11/26/20  4:39 AM  
Result Value Ref Range INR 1.5 (H) 0.9 - 1.1 Prothrombin time 15.0 (H) 9.0 - 11.1 sec Telemetry: AFIB ventricular pacing Assessment:  
 
Active Problems: 
  Chest pain (11/23/2020) Hx of pacer pocket infection and removal 
 
BKA-left CKD AFIB AV node ablation Leadless pacemaker Hypertension DM Anasarca. Plan:  
Thoracic surgery had done exploration of the right chest wound 11/25/2020 and no abscess but it continues drain and swell and painful Her chest pain is felt lower than the incision site of the right upper chest 
It is not clear if she has pericarditis or this is referred pain from the right upper chest wound Troponin is flat 0.11 and does not represent NSTEMI Echo still pending Will try colchicine for pericarditis Cannot take NSAIDS due to warfarin and CKD Breana Dsouza M.D. [facility] Electrophysiology/Cardiology [address] [facility] [address] [facility], [address] [place] [address] Avenue                             
[phone]

## 2020-11-26 NOTE — CONSULTS
11/25/2020    Cardiology Consult  Note   [facility] Adriana Perez M.D. , F.A.C.C.   --------PCP:-Rula Alves MD  
-----Subjective:  
Emeli Looney is a 63 y.o. female  Consult requested from *Hospitalist/Internal Medicine team ** for elevated troponin Consult placed 11/23/20 in the PM , now received 11/25/20 in the PM 
 
Complex patient admitted with a constant right sided chest pain, we are asked to see because of two elevated troponin levels 0.12 done at admit. She went to OR today, she denies acute shortness of breath, has chronic lower extremity edema of right lower leg, some edema to thigh at left BKA, no syncope The pain is constant dull pain under left breast, she was scared it was related to her previous breast cancer. She was sent for admission from her PCP office 2 days ago and Thoracic surgery has seen patient History of  
· atrial fibrillation status post ablation dual lead extraction device with removal of left-sided pacemaker secondary to sepsis and foot infection 4/27/2020 · Permanent atrial fibrillation with tachycardia on beta-blocker · Intolerant of calcium channel blocker due to edema · Lymphedema of the right arm · Bilateral breast cancer · CKD · Diabetes · Hypertension · Reimplantation recent sepsis admission 4/19/2020 to 5/13/2020 at [facility] [address] with sepsis secondary to left foot cellulitis and OM left metatarsals 3 4 and 5 status post left BKA 5/1/2020 found to have vegetation of the pacemaker lead and endocarditis and had dual lead extraction device removal 4/27/2020 by Dr. Ad Dominguez, previously on Coumadin, Patient has history of prior infection requiring transfer of pacemaker to a leadless device. She presented with chest pain on the right side underneath the right breast.  She was found to have a troponin of 0.12 and 0.12 separate lab draws sequentially.   She denies left-sided chest pain or previous recent coronary disease. She says the pain is persistent she went to the OR today and was told that there was no acute finding. Review of op note shows exploration of right anterior chest wound bed done for possible abscess showed no abscess with routine scar tissue and muscle filling the wound bed. Patient went to PCP on [phone] Noted history of hypertension polyneuropathy type 2 diabetes with CKD stage IV PAF chronic warfarin ablation of A. fib cardiac pacemaker for sick sinus syndrome breast cancer left BKA May 2020 and morbid obesity. 08/20/20 ECHO ADULT COMPLETE 08/28/2020 8/28/2020 Narrative · LV: Estimated LVEF is 55 - 60%. Visually measured ejection fraction. Normal cavity size and systolic function (ejection fraction normal). Mild  
concentric hypertrophy. Wall motion: normal. Normal left ventricular  
strain. · LA: Moderately dilated left atrium. · AV: Aortic valve leaflet calcification present without reduced  
excursion. · MV: Mitral valve non-specific thickening. Signed by: Audra Scott MD  
  
Prior cardiac history 11/17/2020 had AV node ablation with leadless pacemaker insertion done for history of sick sinus syndrome Discussion/Plan/Impression: 1. Right sided chest pain that appears constant and musculoskeletal.  This does not likley represent coronary disease. Her troponins are flat at 0.12. No further work-up for cardiac source of this chest pain indicated. Previous echo showed normal LV fx, would still get echo since at risk for endocarditis. Post op hematoma under right clavicle 2. Recent implantation of leadless pacemaker for sick sinus syndrome and atrial fibrillation with rapid ventricular response 3. Atrial fibrillation with rapid ventricle response now status post ablation of the AV node with pacer. Previously on warfarin. Continue beta blocker. 4.  Diabetic ulcer leading to Left BK amputation and sepsis 5. Persistent edema and ascites are  multifactorial likely low protein and diastolic dysfunction related to hypertensive, diabetic renal disease. Continue bumex 6. CKD 4 , creat at 1.93. with anemia of chronic disease. 7. HTN on hydralzine/ metoprolol, no ace due to CKD. Lab Results Component Value Date/Time Creatinine (POC) 1.6 (H) 09/09/2011 08:34 AM  
 Creatinine 1.93 (H) 11/24/2020 04:54 AM  
  
Results for orders placed or performed during the hospital encounter of 11/23/20 EKG, 12 LEAD, INITIAL Result Value Ref Range Ventricular Rate 90 BPM  
 Atrial Rate 92 BPM  
 QRS Duration 166 ms  
 Q-T Interval 446 ms  
 QTC Calculation (Bezet) 545 ms Calculated R Axis 146 degrees Calculated T Axis 89 degrees Diagnosis Ventricular-paced rhythm When compared with ECG of 24-NOV-2020 00:20, No significant change was found Confirmed by Jolly Tyler M.D., [place] ([id number]) on 11/24/2020 5:44:30 PM 
  
 
 
Cardiac Studies/Hx: 
No specialty comments available. 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 She also has no past medical history of Adverse effect of anesthesia, Difficult intubation, Malignant hyperthermia due to anesthesia, or Pseudocholinesterase deficiency. Social history notable for  reports that she has never smoked.  She has never used smokeless tobacco. She reports previous alcohol use. She reports that she does not use drugs. Family history notable for family history includes Alcohol abuse in her father; Cancer in her mother; Diabetes in her brother; Heart Disease in her mother; Hypertension in her brother. Past Medical History:  
Diagnosis Date  Acquired lymphedema Right arm  Arrhythmia  Asthma  Atrial fibrillation ([facility] Dr. José Miguel Alvarado and [facility])  Cancer ([facility] bilateral breast  
 Chronic kidney disease  Diabetes mellitus type II   
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 ([facility] 3/3/2020  Dizziness  Essential hypertension  Hyperlipidemia  Hypertension  Long term (current) use of anticoagulants  Morbid obesity ([address] 3/14/2012  Nausea & vomiting  Neuropathy  Osteoarthritis  Pacemaker  Sick sinus syndrome ([facility]  Type 2 diabetes mellitus with left diabetic foot infection ([facility] 10/29/2019 ROS-pertinents  negative except as above  The pertinent portions of the medical history,physician and nursing notes, meds,vitals , labs and Ins/Outs,are reviewed in the electronic record Pt reports   Right sided cp, sob, fatigue, malaise  and the remainder of a complete multisystem 11 ROS  Are reviewed and negative Social History Tobacco Use  Smoking status: Never Smoker  Smokeless tobacco: Never Used Substance Use Topics  Alcohol use: Not Currently  Drug use: No  
  
Family History Problem Relation Age of Onset  Cancer Mother  Heart Disease Mother  Alcohol abuse Father  Diabetes Brother  Hypertension Brother Prior to Admission Medications Prescriptions Last Dose Informant Patient Reported? Taking? bumetanide (BUMEX) 1 mg tablet   Yes Yes Sig: Take 0.5 mg by mouth two (2) times a day. busPIRone (BUSPAR) 10 mg tablet  Self No Yes Sig: TAKE ONE TABLET BY MOUTH TWICE A DAY  
hydrALAZINE (APRESOLINE) 100 mg tablet   No Yes Sig: Take 1 Tab by mouth three (3) times daily. insulin glargine (Lantus Solostar U-100 Insulin) 100 unit/mL (3 mL) inpn   Yes Yes Sig: 10 Units by SubCUTAneous route Daily (before breakfast). metoprolol tartrate (LOPRESSOR) 25 mg tablet 2020 at 1700  No Yes Sig: Take 1 Tab by mouth two (2) times a day. ondansetron (Zofran ODT) 4 mg disintegrating tablet   No Yes Sig: Take 1 Tab by mouth every eight (8) hours as needed for Nausea. sertraline (ZOLOFT) 100 mg tablet   Yes Yes  
sertraline (ZOLOFT) 25 mg tablet   Yes Yes Sig: Take 25 mg by mouth daily. Take with 100 mg tablet every morning  
warfarin (Coumadin) 1 mg tablet   Yes Yes Si mg -- and 1 all other days Facility-Administered Medications: None Allergies Allergen Reactions  Avapro [Irbesartan] Unable to Obtain  Bactrim [Sulfamethoxazole-Trimethoprim] Other (comments) Sore mouth  Contrast Agent [Iodine] Itching [address]  Morphine Nausea and Vomiting and Swelling  Penicillins Hives Did not tolerate cefepime 3/2020  Pravachol [Pravastatin] Nausea Only  Seafood [Shellfish Containing Products] Hives  Singulair [Montelukast] Other (comments)  
  palpitations  Ace Inhibitors Cough  Amlodipine Swelling  Captopril Cough  Carvedilol Diarrhea Objective:  
 Physical Exam:  
Patient Vitals for the past 12 hrs: 
 Temp Pulse Resp BP SpO2  
20 98.3 °F (36.8 °C) 92 18 (!) 179/94 100 % 20 1606 98.1 °F (36.7 °C) 91 16 (!) 132/56 100 % 20 1500  91  (!) 154/73   
20 1120 97.7 °F (36.5 °C) 91 16 (!) 151/85 98 % 20 1040  91 10  97 % 20 1035  91 10  96 % 20 1030  91 11 126/70 97 % 20 1025  91 11  97 % 11/25/20 1020  91 10  97 % 11/25/20 1015  91 10 130/69 98 % 11/25/20 1010  91 12  96 % 11/25/20 1005  92 11 128/68 95 % 11/25/20 1000  92 13 123/70 96 % 11/25/20 0955  92 14 126/64 96 % 11/25/20 0950  91 22 128/69 96 % 11/25/20 0945  91 15 113/71 97 % 11/25/20 0940 98 °F (36.7 °C) 91 14 113/71 95 % General:  alert, cooperative, no distress, appears stated age, appears in mild discomfort along right side of chest  
HEENT, Neck:  NC,AT- no JVD Chest Wall: inspection ABnormal - has right sided  chest wall deformity with prior mastectomy and wound . Has moderate right sided chest wall  tenderness, the respiratory effort normal  
Lung:   clear to auscultation bilaterally Heart:    2/6 systolic flat medium pitched   murmur at RUSB to LUSB without radiation beyond apex normal rate, regular rhythm, normal S1, S2,  rubs, clicks or gallops Abdomen: nondistended Extremities: Extremities right BKA, bilateral lower extremity edema   normal,  no cyanosis Last 24hr Input/Output: 
 
Intake/Output Summary (Last 24 hours) at 11/25/2020 2116 Last data filed at 11/25/2020 3594 Gross per 24 hour Intake 300 ml Output 1 ml Net 299 ml Data Review:  
Lab Results Component Value Date/Time CK 4,609 (H) 05/14/2020 05:15 AM  
 CK - MB 1.3 04/09/2014 11:00 AM  
 CK-MB Index 1.5 04/09/2014 11:00 AM  
 Troponin-I, Qt. 0.12 (H) 11/24/2020 04:54 AM  
  (H) 04/09/2014 04:14 PM  
 
Lab Results Component Value Date/Time  Sodium 136 11/24/2020 04:54 AM  
 Potassium 4.2 11/24/2020 04:54 AM  
 Chloride 109 (H) 11/24/2020 04:54 AM  
 CO2 18 (L) 11/24/2020 04:54 AM  
 Anion gap 9 11/24/2020 04:54 AM  
 Glucose 135 (H) 11/24/2020 04:54 AM  
 BUN 23 (H) 11/24/2020 04:54 AM  
 Creatinine 1.93 (H) 11/24/2020 04:54 AM  
 BUN/Creatinine ratio 12 11/24/2020 04:54 AM  
 GFR est AA 32 (L) 11/24/2020 04:54 AM  
 GFR est non-AA 26 (L) 11/24/2020 04:54 AM  
 Calcium 8.4 (L) 11/24/2020 04:54 AM  
 Bilirubin, total 1.2 (H) 11/23/2020 02:26 PM  
 Alk. phosphatase 123 (H) 11/23/2020 02:26 PM  
 Protein, total 7.3 11/23/2020 02:26 PM  
 Albumin 3.4 (L) 11/23/2020 02:26 PM  
 Globulin 3.9 11/23/2020 02:26 PM  
 A-G Ratio 0.9 (L) 11/23/2020 02:26 PM  
 ALT (SGPT) 23 11/23/2020 02:26 PM  
 AST (SGOT) 34 11/23/2020 02:26 PM  
 
CT Results (most recent): 
Results from Hospital Encounter encounter on 11/23/20 CT ABD PELV WO CONT Narrative EXAM:  CT CHEST WO CONT, CT ABD PELV WO CONT INDICATION:   right chest wall pain, dyspnea, abd pain, diarrhea COMPARISON: CT chest August 19, 2020, CT abdomen November 5, 2020. [facility] TECHNI[facility]:  
Multislice helical CT was performed from the thoracic inlet to the pubic 
symphysis was performed without Isovue intravenous contrast administration. Contiguous 5 mm axial images were reconstructed and lung and soft tissue windows 
were generated. Coronal and sagittal reformations were generated. CT dose reduction was achieved through the use of a standardized protocol 
tailored for this examination and automatic exposure control for dose 
modulation. [facility] FINDINGS: 
CHEST: 
Chest wall/thoracic inlet: Postsurgical changes of resection of the medial right 
clavicle. There is a 7.4 x 3 cm oval lesion in the surgical bed, which may 
represent a postoperative seroma or a hematoma. Othelia Amando Thyroid: Within normal limits. Mediastinum/chen: Multiple small mediastinal and hilar lymph nodes. Heart/vessels: Extensive coronary artery calcifications. Small pericardial 
effusion. Lungs/Pleura: Right lower lobe pulmonary nodule is unchanged. Mild bibasilar 
atelectasis small bilateral pleural effusions. [email or web address] [employer] ABDOMEN: 
Liver: Mild perihepatic ascites, not significantly changed. 3 cm soft tissue 
lesion in the anterior abdomen medial to the liver is unchanged. Gallbladder/Biliary: Status post cholecystectomy. Spleen: Perisplenic ascites, mildly worse. Pancreas: No acute process. Adrenals: Chronic right adrenal adenoma. Kidneys: Atrophic. No hydronephrosis. Peritoneum/Mesenteries: Mild abdominal and pelvic ascites. Extraperitoneum: Diffuse body wall edema. Gastrointestinal tract: No bowel obstruction or perforation. The appendix is 
grossly normal. 
Vascular: Diffuse calcific aortic atherosclerosis without aneurysm. Tonye Cogan PELVIS: 
Extraperitoneum: Within normal limits. Ureters: Within normal limits. Bladder: Within normal limits. Reproductive System: Uterus is noted. . 
MSK: Degenerative changes. No acute osseous abnormality. .  
 Impression IMPRESSION: 
1. Evidence of interval resection of the medial right clavicle. There is a 3 cm 
postoperative seroma/hematoma in the surgical bed. 2. Small bilateral pleural effusions with mild bibasilar atelectasis. 3. Mild abdominal and pelvic ascites. 4. Diffuse anasarca. Results for orders placed or performed during the hospital encounter of 11/23/20 EKG, 12 LEAD, INITIAL Result Value Ref Range Ventricular Rate 90 BPM  
 Atrial Rate 92 BPM  
 QRS Duration 166 ms  
 Q-T Interval 446 ms  
 QTC Calculation (Bezet) 545 ms Calculated R Axis 146 degrees Calculated T Axis 89 degrees Diagnosis Ventricular-paced rhythm When compared with ECG of 24-NOV-2020 00:20, No significant change was found Confirmed by Mi Hayes M.D., Allie Gutierrez ([id number]) on 11/24/2020 5:44:30 PM 
  
  
Lab Results Component Value Date/Time WBC 6.0 11/24/2020 04:54 AM  
 Hemoglobin (POC) 13.3 09/09/2011 08:34 AM  
 HGB 7.7 (L) 11/24/2020 04:54 AM  
 Hematocrit (POC) 39 09/09/2011 08:34 AM  
 HCT 26.3 (L) 11/24/2020 04:54 AM  
 PLATELET 586 99/47/0573 04:54 AM  
 MCV 82.2 11/24/2020 04:54 AM  
 
Lab Results Component Value Date/Time TSH 0.757 06/11/2018 02:06 PM  
 
 
Recent Results (from the past 24 hour(s)) GLUCOSE, POC Collection Time: 11/25/20  9:46 AM  
Result Value Ref Range Glucose (POC) 103 (H) 65 - 100 mg/dL Performed by Jaquelin Boswell Lab Results Component Value Date/Time Cholesterol, total 141 11/11/2019 08:47 AM  
 Cholesterol, total 107 11/12/2018 10:28 AM  
 Cholesterol, total 158 08/16/2017 08:16 AM  
 Cholesterol, total 151 08/26/2016 11:26 AM  
 Cholesterol, total 135 05/12/2015 10:44 AM  
 HDL Cholesterol 32 (L) 11/11/2019 08:47 AM  
 HDL Cholesterol 27 (L) 11/12/2018 10:28 AM  
 HDL Cholesterol 29 (L) 08/16/2017 08:16 AM  
 HDL Cholesterol 31 (L) 08/26/2016 11:26 AM  
 HDL Cholesterol 26 (L) 05/12/2015 10:44 AM  
 LDL, calculated 82 11/11/2019 08:47 AM  
 LDL, calculated 54 11/12/2018 10:28 AM  
 LDL, calculated 85 08/16/2017 08:16 AM  
 LDL, calculated 84 08/26/2016 11:26 AM  
 LDL, calculated 79 05/12/2015 10:44 AM  
 Triglyceride 137 11/11/2019 08:47 AM  
 Triglyceride 129 11/12/2018 10:28 AM  
 Triglyceride 221 (H) 08/16/2017 08:16 AM  
 Triglyceride 178 (H) 08/26/2016 11:26 AM  
 Triglyceride 152 (H) 05/12/2015 10:44 AM  
  
Chuck Stubbs MD 11/25/2020

## 2020-11-26 NOTE — PROGRESS NOTES
Pharmacist Note  Warfarin Dosing Consult provided for this 63 y. o.female to manage warfarin for Atrial Fibrillation INR Goal: 2 - 3 Home regimen/ tablet size: 2 mg M-W-F; 1 mg Tu/Th/Sat/ Sun 
 
Drugs that may increase INR: None Drugs that may decrease INR: None Other current anticoagulants/ drugs that may increase bleeding risk: None Risk factors: None Daily INR ordered: YES Recent Labs  
  11/26/20 
0439 11/24/20 
0454 11/23/20 
2222 11/23/20 
1426 HGB  --  7.7*  --  8.3* INR 1.5*  --  1.9*  --   
 
Date               INR                  Dose 
11/23  1.9  Held 
11/24                --                    Held 
11/25               --                     2 mg  
11/26              1.5                   1 mg Assessment/ Plan: Will order warfarin 1 mg PO x 1 dose - continuation of home dose, held previously as the patient underwent exploratory thoracic surgery on 11/25 Pharmacy will continue to monitor daily and adjust therapy as indicated. Please contact the pharmacist at  for outpatient recommendations if needed. Emily Robertson, PharmD, BCPP, BCPS Clinical Pharmacy Specialist, [facility]

## 2020-11-26 NOTE — PROGRESS NOTES
Problem: Pressure Injury - Risk of 
Goal: *Prevention of pressure injury Description: Document Valdemar Scale and appropriate interventions in the flowsheet. Outcome: Progressing Towards Goal 
Note: Pressure Injury Interventions: 
Sensory Interventions: Assess changes in LOC Moisture Interventions: Absorbent underpads, Internal/External urinary devices Activity Interventions: Pressure redistribution bed/mattress(bed type) Mobility Interventions: HOB 30 degrees or less Nutrition Interventions: Document food/fluid/supplement intake Friction and Shear Interventions: Minimize layers

## 2020-11-26 NOTE — PROGRESS NOTES
Bedside and Verbal shift change report given to [address] [place] [address] (oncoming nurse) by Gonzales Foster RN (offgoing nurse). Report included the following information SBAR, Kardex, Intake/Output and MAR.

## 2020-11-26 NOTE — PROGRESS NOTES
Problem: Risk for Spread of Infection Goal: Prevent transmission of infectious organism to others Description: Prevent the transmission of infectious organisms to other patients, staff members, and visitors. Outcome: Progressing Towards Goal 
  
Problem: Patient Education:  Go to Education Activity Goal: Patient/Family Education Outcome: Progressing Towards Goal 
  
Problem: Falls - Risk of 
Goal: *Absence of Falls Description: Document Rosalva Ponce Fall Risk and appropriate interventions in the flowsheet. Outcome: Progressing Towards Goal 
Note: Fall Risk Interventions: 
Mobility Interventions: Communicate number of staff needed for ambulation/transfer Medication Interventions: Patient to call before getting OOB, Teach patient to arise slowly Elimination Interventions: Call light in reach, Toileting schedule/hourly rounds Problem: Patient Education: Go to Patient Education Activity Goal: Patient/Family Education Outcome: Progressing Towards Goal

## 2020-11-26 NOTE — PROGRESS NOTES
[address] [facility] Adult  Hospitalist Group Hospitalist Progress Note Anamika Toscano MD 
Answering service: [phone] OR [phone] from in house phone Progress Note Patient: Aracely Frost MRN: [id number]  SSN: [id number] YOB: 1960  Age: 63 y.o. Sex: female Admit Date: 11/23/2020 LOS: 3 days Subjective:  
 
Patient presents with chest pain, s/p exploration of the right clavicular area 11/25 for concern of fluid collection, but no infection or bleeding was found. Patient also with fluid overload currently being diuresed. Still complaining of right-sided chest pain. No other acute complaint. Objective:  
 
Vitals:  
 11/26/20 0430 11/26/20 0443 11/26/20 0725 11/26/20 1241 BP: 129/61  139/64 120/60 Pulse: 93  92 92 Resp: 18  18 16 Temp: 98.1 °F (36.7 °C)  98.2 °F (36.8 °C) 98 °F (36.7 °C) SpO2: 97%  96% 98% Weight:  120.4 kg (265 lb 6.9 oz) Intake and Output: 
Current Shift: 11/26 0701 - 11/26 1900 In: -  
Out: 528 [MRWLO:329] Last three shifts: 11/24 1901 - 11/26 0700 In: 300 [I.V.:300] Out: 1 Physical Exam:  
GENERAL: alert, cooperative, no distress, appears stated age THROAT & NECK: normal and no erythema or exudates noted. LUNG: clear to auscultation bilaterally BREAST: Patient has large hard mass on the right breast occupying bilateral upper quadrants HEART: regular rate and rhythm, S1, S2 normal, no murmur, click, rub or gallop ABDOMEN: soft, non-tender. Bowel sounds normal. No masses,  no organomegaly EXTREMITIES:  extremities normal, atraumatic, no cyanosis or edema SKIN: no rash or abnormalities NEUROLOGIC: AOx3. Gait normal. Reflexes and motor strength normal and symmetric. Cranial nerves 2-12 and sensation grossly intact. PSYCHIATRIC: non focal 
 
Lab/Data Review: All lab results for the last 24 hours reviewed. Recent Results (from the past 24 hour(s)) METABOLIC PANEL, BASIC Collection Time: 11/26/20  4:39 AM  
Result Value Ref Range Sodium 137 136 - 145 mmol/L Potassium 3.7 3.5 - 5.1 mmol/L Chloride 109 (H) 97 - 108 mmol/L  
 CO2 22 21 - 32 mmol/L Anion gap 6 5 - 15 mmol/L Glucose 138 (H) 65 - 100 mg/dL BUN 20 6 - 20 MG/DL Creatinine 1.85 (H) 0.55 - 1.02 MG/DL  
 BUN/Creatinine ratio 11 (L) 12 - 20 GFR est AA 34 (L) >60 ml/min/1.73m2 GFR est non-AA 28 (L) >60 ml/min/1.73m2 Calcium 8.4 (L) 8.5 - 10.1 MG/DL PROTHROMBIN TIME + INR Collection Time: 11/26/20  4:39 AM  
Result Value Ref Range INR 1.5 (H) 0.9 - 1.1 Prothrombin time 15.0 (H) 9.0 - 11.1 sec Imaging: No results found. Assessment and Plan:  
 
Chest pain 
-Atypical chest pain, likely musculoskeletal related to recent surgery in the right clavicular area for osteomyelitis 
-Underwent exploration of right anterior chest wall wound bed 11/25 per thoracic surgery 
-No finding of abscess, infection or hematoma 
-Antibiotic discontinued 
-Trial of colchicine for pericarditis per cardiology Anasarca 
-Being diuresed with Bumex 1 mg twice daily Elevated troponin 
-No clinical signs and symptoms of acute coronary syndrome 
-Echocardiogram pending 
-Cardiology evaluating Hypertension 
-Continue hydralazine and metoprolol 
-Blood pressure stable Atrial fibrillation 
-Paroxysmal atrial fibrillation, s/p AV node ablation 
-Prior history of pacemaker pocket infection with subsequent removal, and replacement with leadless pacemaker 
-On Coumadin for anticoagulation Anemia 
-Normocytic anemia 
-H&H stable, continuemonitor Chronic kidney disease stage IV 
-Renal function stable, continue monitor Anticipated disposition is 24 to 48 hours pending progress. Signed By: Shaunna Fernández MD   
 November 26, 2020

## 2020-11-26 NOTE — PROGRESS NOTES
See consult note Increase bumex to bid IV Restart hydralazine and metoprolol DM meds per primary team 
On warfarin , can continue , If hematoma gets bigger can hold for few days

## 2020-11-26 NOTE — PROGRESS NOTES
Bedside and Verbal shift change report given to Patricia Shaver RN (oncoming nurse) by Rishi Barber RN and Malik Trivedi RN (offgoing nurse). Report included the following information SBAR, Kardex, Intake/Output, MAR, Recent Results and Cardiac Rhythm Paced.

## 2020-11-26 NOTE — PROGRESS NOTES
Mrs. Danyell Valdivia is POD#1 after R chest wall wound re-exploration. No acute events overnight. Afebrile HD stable On exam she is laying in bed Alert and oriented R ant chest wound c/d/i From a Thoracic standpoint, Mrs. Danyell Valdivia is progressing well. There was no evidence of infection, seroma or hematoma upon exploration yesterday. Her wound is well healing and had filled in with muscle and scar tissue. Stable for discharge from our standpoint. She can follow up in our clinic.

## 2020-11-27 NOTE — PROGRESS NOTES
Problem: Risk for Spread of Infection Goal: Prevent transmission of infectious organism to others Description: Prevent the transmission of infectious organisms to other patients, staff members, and visitors. Outcome: Progressing Towards Goal 
  
Problem: Patient Education:  Go to Education Activity Goal: Patient/Family Education Outcome: Progressing Towards Goal 
  
Problem: Falls - Risk of 
Goal: *Absence of Falls Description: Document Trixie Shelton Fall Risk and appropriate interventions in the flowsheet. Outcome: Progressing Towards Goal 
Note: Fall Risk Interventions: 
Mobility Interventions: Bed/chair exit alarm, Communicate number of staff needed for ambulation/transfer, Strengthening exercises (ROM-active/passive), Utilize walker, cane, or other assistive device Medication Interventions: Evaluate medications/consider consulting pharmacy, Teach patient to arise slowly Elimination Interventions: Call light in reach Problem: Patient Education: Go to Patient Education Activity Goal: Patient/Family Education Outcome: Progressing Towards Goal 
  
Problem: Discharge Planning Goal: *Discharge to safe environment Outcome: Progressing Towards Goal 
  
Problem: Pressure Injury - Risk of 
Goal: *Prevention of pressure injury Description: Document Valdemar Scale and appropriate interventions in the flowsheet. Outcome: Progressing Towards Goal 
Note: Pressure Injury Interventions: 
Sensory Interventions: Assess changes in LOC, Assess need for specialty bed, Keep linens dry and wrinkle-free, Maintain/enhance activity level, Minimize linen layers Moisture Interventions: Absorbent underpads, Apply protective barrier, creams and emollients, Internal/External urinary devices, Minimize layers, Moisture barrier Activity Interventions: Pressure redistribution bed/mattress(bed type) Mobility Interventions: HOB 30 degrees or less Nutrition Interventions: Document food/fluid/supplement intake Friction and Shear Interventions: Apply protective barrier, creams and emollients, Minimize layers, HOB 30 degrees or less Problem: Patient Education: Go to Patient Education Activity Goal: Patient/Family Education Outcome: Progressing Towards Goal 
  
Problem: Unstable angina/NSTEMI: Day of Admission/Day 1 Goal: Activity/Safety Outcome: Progressing Towards Goal 
Goal: Consults, if ordered Outcome: Progressing Towards Goal 
Goal: Diagnostic Test/Procedures Outcome: Progressing Towards Goal 
Goal: Nutrition/Diet Outcome: Progressing Towards Goal 
Goal: Discharge Planning Outcome: Progressing Towards Goal 
Goal: Medications Outcome: Progressing Towards Goal 
Goal: Treatments/Interventions/Procedures Outcome: Progressing Towards Goal 
Goal: Psychosocial 
Outcome: Progressing Towards Goal 
Goal: *Hemodynamically stable Outcome: Progressing Towards Goal 
Goal: *Optimal pain control at patient's stated goal 
Outcome: Progressing Towards Goal 
Goal: *Lungs clear or at baseline Outcome: Progressing Towards Goal 
  
Problem: Unstable Angina/NSTEMI: Discharge Outcomes Goal: *Hemodynamically stable Outcome: Progressing Towards Goal 
Goal: *Stable cardiac rhythm Outcome: Progressing Towards Goal 
Goal: *Lungs clear or at baseline Outcome: Progressing Towards Goal 
Goal: *Optimal pain control at patient's stated goal 
Outcome: Progressing Towards Goal 
Goal: *Identifies cardiac risk factors Outcome: Progressing Towards Goal 
Goal: *Verbalizes home exercise program, activity guidelines, cardiac precautions Outcome: Progressing Towards Goal 
Goal: *Verbalizes understanding and describes prescribed diet Outcome: Progressing Towards Goal 
Goal: *Verbalizes name, dosage, time, side effects, and number of days to continue medications Outcome: Progressing Towards Goal 
Goal: *Anxiety reduced or absent Outcome: Progressing Towards Goal 
 Goal: *Understands and describes signs and symptoms to report to providers(Stroke Metric) Outcome: Progressing Towards Goal 
Goal: *Describes follow-up/return visits to physicians Outcome: Progressing Towards Goal 
Goal: *Describes available resources and support systems Outcome: Progressing Towards Goal 
Goal: *Influenza immunization Outcome: Progressing Towards Goal 
Goal: *Pneumococcal immunization Outcome: Progressing Towards Goal 
Goal: *Describes smoking cessation resources Outcome: Progressing Towards Goal

## 2020-11-27 NOTE — PROGRESS NOTES
Problem: Mobility Impaired (Adult and Pediatric) Goal: *Acute Goals and Plan of Care (Insert Text) Description: FUNCTIONAL STATUS PRIOR TO ADMISSION: Patient was modified independent using a rolling walker and left BKA prosthetic for functional mobility. HOME SUPPORT PRIOR TO ADMISSION: The patient lived with her  but generally but did not require assist. 
 
Physical Therapy Goals Initiated 11/27/2020 1. Patient will move from supine to sit and sit to supine  in bed with modified independence within 7 day(s). 2.  Patient will transfer from bed to chair and chair to bed with modified independence using the least restrictive device within 7 day(s). 3.  Patient will perform sit to stand with modified independence within 7 day(s). 4.  Patient will ambulate with modified independence for 50 feet with the least restrictive device within 7 day(s). Outcome: Progressing Towards Goal 
 
PHYSICAL THERAPY EVALUATION Patient: Charbel Sheehan (89 y.o. female) Date: 11/27/2020 Primary Diagnosis: Chest pain [R07.9] Procedure(s) (LRB): 
EXPLORATION OF RIGHT CHEST PHLEGMON (Right) 2 Days Post-Op Precautions:     
 
ASSESSMENT Based on the objective data described below, the patient presents with severe nausea during eval and generalized weakness following admission for chest pain with exploration of right chest phlegmon. The patient is well known to this writer when previously admitted for distal clavicular resection and discharge to [facility]. She had discharged home post rehab and was modified independent with her rolling walker. She initially adamantly refused during evaluation but agree to bedside assessment. Once she transitioned into long sitting, she started with persistent nausea and vomiting, for which she was previously medicated. Sitting balance intact and general bed moblity supervision to modified independent. She deferred any attempts to stand d/t nausea. OOB activity limited this date d/t severe nausea causing patient to refuse. Anticipate good progress towards goals and hopeful for discharge home with HHPT pending progress. Current Level of Function Impacting Discharge (mobility/balance): modified independent for bed mobility Patient will benefit from skilled therapy intervention to address the above noted impairments. PLAN : 
Recommendations and Planned Interventions: bed mobility training, transfer training, gait training, therapeutic exercises and neuromuscular re-education Frequency/Duration: Patient will be followed by physical therapy:  5 times a week to address goals. Recommendation for discharge: (in order for the patient to meet his/her long term goals) Physical therapy at least 2 days/week in the home IF patient discharges home will need the following DME: patient owns DME required for discharge SUBJECTIVE:  
Patient stated I'm not doing it today. I just don't feel good.  OBJECTIVE DATA SUMMARY:  
HISTORY:   
Past Medical History:  
Diagnosis Date  Acquired lymphedema Right arm  Arrhythmia  Asthma  Atrial fibrillation ([facility] Dr. Romeo Gage and Dr. José Kelly [facility])  Cancer ([facility] bilateral breast  
 Chronic kidney disease  Diabetes mellitus type II   
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  
Procedure Laterality Date [address] UNLISTED    
 gastric bypass  GASTRIC BYPASS,OBESE<150CM SAW-EN-Y  7/08  
 [place]  HX AFIB ABLATION    
 HX AMPUTATION FOOT Left 04/2020  HX BREAST RECONSTRUCTION Bilateral   
 HX CARPAL TUNNEL RELEASE [address] [facility] [address] RIGHT MODIFIED RADICAL   
 HX MASTECTOMY Left   
 no lymphnode removal  
 HX MOHS PROCEDURES  1995  
 right  HX ORTHOPAEDIC Right   
 knee surgery  HX PACEMAKER  11/17/2020 Dr. Bari Gonzalez. Insertion of permanent pacemaker. AV node ablation  HX PACEMAKER    
 IR INSERT TUNL CVC W PORT OVER 5 YEARS    
 IR INSERT TUNL CVC W/O PORT OVER 5 YR  5/4/2020  IR INSERT TUNL CVC W/O PORT OVER 5 YR  9/8/2020  IR REMOVE TUNL CVAD W/O PORT / PUMP  6/26/2020  IR REMOVE TUNL CVAD W/O PORT / PUMP  10/19/2020  [address] [facility] [address]  8.21.07  
 IA [address] AND 1994  IA RELOCATION OF SKIN POCKET FOR PACEMAKER N/A 4/24/2020 RELOCATE 2 [facility] performed by Caleb Carrizales MD at [address] CATH LAB  IA RMVL TRANSVNS PM ELTRD 1 LEAD SYS ATR/VENTR N/A 4/24/2020 Remove Pacemaker Dual Leads performed by Caleb Carrizales MD at [facility] CARDIAC CATH LAB  IA RMVL TRANSVNS PM ELTRD 1 LEAD SYS ATR/VENTR N/A 4/27/2020 REMOVE PACEMAKER DUAL LEADS performed [place] Caleb [place]rizales [place] at [address] CATH LAB  IA TRABECULOPLASTY BY LASER SURGERY    
 US GUIDE ASP OVARIAN CYST ABD APPROACH  8.21.07  
 RIGHT Personal factors and/or comorbidities impacting plan of care:  
 
Home Situation Home Environment: Private residence # Steps to Enter: 0 Wheelchair Ramp: Yes One/Two Story Residence: One story Living Alone: No 
Support Systems: Spouse/Significant Other/Partner Patient Expects to be Discharged to[de-identified] Private residence Current DME Used/Available at Home: Shower chair, Commode, bedside, Cane, straight, Walker, rolling, Wheelchair EXAMINATION/PRESENTATION/DECISION MAKING:  
Critical Behavior: 
Neurologic State: Alert Orientation Level: Oriented X4 Hearing: Auditory Auditory Impairment: None Skin:   
Edema:  
Range Of Motion: 
AROM: Generally decreased, functional 
  
  
  
  
  
  
  
Strength:   
Strength: Generally decreased, functional(right tricep/bicep 3+/5) Tone & Sensation:  
Tone: Normal 
  
  
  
  
  
  
  
  
   
Coordination: 
Coordination: Generally decreased, functional 
Vision:  
  
Functional Mobility: 
Bed Mobility: 
Rolling: Supervision Supine to Sit: Modified independent Transfers: 
  
  
     
  
     
  
  
Balance:  
Sitting: Intact Physical Therapy Evaluation Charge Determination History Examination Presentation Decision-Making MEDIUM  Complexity : 1-2 comorbidities / personal factors will impact the outcome/ POC  LOW Complexity : 1-2 Standardized tests and measures addressing body structure, function, activity limitation and / or participation in recreation  LOW Complexity : Stable, uncomplicated  LOW Complexity : FOTO score of  Based on the above components, the patient evaluation is determined to be of the following complexity level: LOW Pain Rating: 
 
 
Activity Tolerance:  
Fair After treatment patient left in no apparent distress:  
Supine in bed and Call bell within reach COMMUNICATION/EDUCATION:  
The patients plan of care was discussed with: Registered nurse. Fall prevention education was provided and the patient/caregiver indicated understanding., Patient/family have participated as able in goal setting and plan of care. and Patient/family agree to work toward stated goals and plan of care. Thank you for this referral. 
Milady Cruz, PT, DPT Time Calculation: 28 mins

## 2020-11-27 NOTE — PROGRESS NOTES
11/27/2020 -  
TEE: 
- RUR: 31% 
- Disposition is TBD dependent on progression - potential discharge to own home with transport via spouse - Diuresing continues - Echo pending CRM: Charity Mcgregor, MPH, [address] [place] [address]; Z: [phone]

## 2020-11-27 NOTE — PROGRESS NOTES
[address] [facility] Adult  Hospitalist Group Hospitalist Progress Note Vero Ramon MD 
Answering service: [phone] OR [phone] from in house phone Progress Note Patient: Melody Munson MRN: [id number]  SSN: [id number] YOB: 1960  Age: 63 y.o. Sex: female Admit Date: 11/23/2020 LOS: 4 days Subjective:  
 
Patient presents with chest pain, s/p exploration of the right clavicular area 11/25 for concern of fluid collection, but no infection or bleeding was found. Patient also with fluid overload currently being diuresed. Still complaining of right-sided chest pain, but improving. Also patient is nauseated without much p.o. intake. Denies any abdominal pain. Objective:  
 
Vitals:  
 11/27/20 0904 11/27/20 0906 11/27/20 1022 11/27/20 1214 BP: (!) 141/72 (!) 141/72 (!) 141/72 131/74 Pulse: 90   90 Resp: 16   16 Temp: 98.2 °F (36.8 °C)   98.1 °F (36.7 °C) SpO2: 97%   96% Weight:   117.5 kg (259 lb) Height:   5' 10\" (1.778 m) Intake and Output: 
Current Shift: 11/27 0701 - 11/27 1900 In: 390 [P.O.:390] Out: 1550 [TWKLD:6450] Last three shifts: 11/25 1901 - 11/27 0700 In: 480 [P.O.:480] Out: 1000 [Urine:1000] Physical Exam:  
GENERAL: alert, cooperative, no distress, appears stated age THROAT & NECK: normal and no erythema or exudates noted. LUNG: clear to auscultation bilaterally BREAST: Patient has large hard mass on the right breast occupying bilateral upper quadrants HEART: regular rate and rhythm, S1, S2 normal, no murmur, click, rub or gallop ABDOMEN: soft, non-tender. Bowel sounds normal. No masses,  no organomegaly EXTREMITIES:  extremities normal, atraumatic, no cyanosis or edema SKIN: no rash or abnormalities NEUROLOGIC: AOx3.  Gait normal. Reflexes and motor strength normal and symmetric. Cranial nerves 2-12 and sensation grossly intact. PSYCHIATRIC: non focal 
 
Lab/Data Review: All lab results for the last 24 hours reviewed. Recent Results (from the past 24 hour(s)) METABOLIC PANEL, BASIC Collection Time: 11/27/20  2:14 AM  
Result Value Ref Range Sodium 138 136 - 145 mmol/L Potassium 3.8 3.5 - 5.1 mmol/L Chloride 107 97 - 108 mmol/L  
 CO2 23 21 - 32 mmol/L Anion gap 8 5 - 15 mmol/L Glucose 125 (H) 65 - 100 mg/dL BUN 20 6 - 20 MG/DL Creatinine 1.73 (H) 0.55 - 1.02 MG/DL  
 BUN/Creatinine ratio 12 12 - 20 GFR est AA 36 (L) >60 ml/min/1.73m2 GFR est non-AA 30 (L) >60 ml/min/1.73m2 Calcium 8.4 (L) 8.5 - 10.1 MG/DL PROTHROMBIN TIME + INR Collection Time: 11/27/20  2:14 AM  
Result Value Ref Range INR 1.3 (H) 0.9 - 1.1 Prothrombin time 13.8 (H) 9.0 - 11.1 sec CBC WITH AUTOMATED DIFF Collection Time: 11/27/20  4:00 AM  
Result Value Ref Range WBC 6.4 3.6 - 11.0 K/uL  
 RBC 3.41 (L) 3.80 - 5.20 M/uL HGB 8.1 (L) 11.5 - 16.0 g/dL HCT 27.8 (L) 35.0 - 47.0 % MCV 81.5 80.0 - 99.0 FL  
 MCH 23.8 (L) 26.0 - 34.0 PG  
 MCHC 29.1 (L) 30.0 - 36.5 g/dL  
 RDW 17.9 (H) 11.5 - 14.5 % PLATELET 414 917 - 800 K/uL MPV 11.2 8.9 - 12.9 FL  
 NRBC 2.0 (H) 0  WBC ABSOLUTE NRBC 0.13 (H) 0.00 - 0.01 K/uL NEUTROPHILS 67 32 - 75 % LYMPHOCYTES 11 (L) 12 - 49 % MONOCYTES 18 (H) 5 - 13 % EOSINOPHILS 2 0 - 7 % BASOPHILS 1 0 - 1 % IMMATURE GRANULOCYTES 1 (H) 0.0 - 0.5 % ABS. NEUTROPHILS 4.2 1.8 - 8.0 K/UL  
 ABS. LYMPHOCYTES 0.7 (L) 0.8 - 3.5 K/UL  
 ABS. MONOCYTES 1.2 (H) 0.0 - 1.0 K/UL  
 ABS. EOSINOPHILS 0.1 0.0 - 0.4 K/UL  
 ABS. BASOPHILS 0.1 0.0 - 0.1 K/UL  
 ABS. IMM. GRANS. 0.1 (H) 0.00 - 0.04 K/UL  
 DF SMEAR SCANNED    
 RBC COMMENTS POLYCHROMASIA 1+ 
    
 RBC COMMENTS ANISOCYTOSIS 1+ 
    
 RBC COMMENTS OVALOCYTES PRESENT 
    
 RBC COMMENTS HYPOCHROMIA 1+ ECHO ADULT COMPLETE  
 Collection Time: 11/27/20 10:39 AM  
Result Value Ref Range IVSd 0.94 (A) 0.6 - 0.9 cm LVIDd 3.99 3.9 - 5.3 cm LVIDs 2.74 cm  
 LVOT d 1.95 cm  
 LVPWd 1.03 (A) 0.6 - 0.9 cm  
 LVOT Peak Gradient 2.01 mmHg LVOT SV 34.7 mL  
 LVOT Peak Velocity 70.92 cm/s LVOT VTI 11.57 cm Left Atrium Major Axis 4.36 cm  
 LA Volume 74.94 22 - 52 mL  
 LA Area 4C 24.47 cm2 LA Vol 2C 57.78 (A) 22 - 52 mL  
 LA Vol 4C 80.09 (A) 22 - 52 mL Aortic Valve Area by Continuity of Peak Velocity 1.33 cm2 Aortic Valve Area by Continuity of VTI 1.34 cm2 AoV PG 10.23 mmHg Aortic Valve Systolic Mean Gradient 1.91 mmHg Aortic Valve Systolic Peak Velocity 880.75 cm/s AoV VTI 25.84 cm  
 MVA VTI 1.15 cm2 MV Peak Gradient 8.05 mmHg MV Mean Gradient 4.08 mmHg Mitral Valve Max Velocity 141.89 cm/s Mitral Valve Annulus Velocity Time Integral 30.04 cm Pulmonic Valve Systolic Peak Instantaneous Gradient 3.20 mmHg Pulmonic Valve Max Velocity 89.50 cm/s Tapse 1.71 1.5 - 2.0 cm Triscuspid Valve Regurgitation Peak Gradient 34.52 mmHg  
 TR Max Velocity 293.77 cm/s Ao Root D 3.00 cm LV Mass .9 67 - 162 g  
 LV Mass AL Index 53.2 43 - 95 g/m2 Left Atrium Minor Axis 1.87 cm  
 LA Vol Index 32.17 16 - 28 ml/m2 LA Vol Index 24.80 16 - 28 ml/m2 LA Vol Index 34.38 16 - 28 ml/m2 JASON/BSA Pk Jeff 0.6 cm2/m2 JASON/BSA VTI 0.6 cm2/m2 Imaging: No results found. Assessment and Plan:  
 
Chest pain 
-Atypical chest pain, likely musculoskeletal related to recent surgery in the right clavicular area for osteomyelitis 
-Underwent exploration of right anterior chest wall wound bed 11/25 per thoracic surgery 
-No finding of abscess, infection or hematoma 
-Antibiotic discontinued 
-Trial of colchicine for pericarditis per cardiology, echocardiogram pending Anasarca 
-Being diuresed with Bumex 1 mg twice daily Nausea 
-Monitor symptoms, if not improved, check KUB Elevated troponin 
-No clinical signs and symptoms of acute coronary syndrome 
-Echocardiogram pending 
-Cardiology evaluating Hypertension 
-Continue hydralazine and metoprolol 
-Blood pressure stable Atrial fibrillation 
-Paroxysmal atrial fibrillation, s/p AV node ablation 
-Prior history of pacemaker pocket infection with subsequent removal, and replacement with leadless pacemaker 
-On Coumadin for anticoagulation Anemia 
-Normocytic anemia 
-H&H stable, continuemonitor Chronic kidney disease stage IV 
-Renal function stable, continue monitor Anticipated disposition is 24 to 48 hours pending progress. Signed By: Omari Sena MD   
 November 27, 2020

## 2020-11-27 NOTE — PROGRESS NOTES
Bedside and Verbal shift change report given to Mary Enciso (oncoming nurse) by Namita Flores RN (offgoing nurse). Report included the following information SBAR, Kardex, ED Summary, Procedure Summary, Intake/Output, MAR, Recent Results and Cardiac Rhythm Paced.

## 2020-11-27 NOTE — PROGRESS NOTES
Cardiology Progress Note 
    
 
11/27/2020 10:51 AM 
 
Admit Date: 11/23/2020 Admit Diagnosis: Chest pain [R07.9] Subjective: Is seen today for Dr Erickson Guzmán still has chest pain right side of sternum, 1 hand below the right upper chest incision Pocket on right chest is draining; 8/2020 resection of the right sternoclavicular joint 08/20/20 ECHO ADULT COMPLETE 08/28/2020 8/28/2020 Narrative · LV: Estimated LVEF is 55 - 60%. Visually measured ejection fraction. Normal cavity size and systolic function (ejection fraction normal). Mild  
concentric hypertrophy. Wall motion: normal. Normal left ventricular  
strain. · LA: Moderately dilated left atrium. · AV: Aortic valve leaflet calcification present without reduced  
excursion. · MV: Mitral valve non-specific thickening. Signed by: Belgica Santillan MD  
 
Chest CT Small pericardial effusion and pleural effusions Past Medical History:  
Diagnosis Date  Acquired lymphedema Right arm  Arrhythmia  Asthma  Atrial fibrillation ([address] Dr. Dominic Richards and Dr. Michele alves [facility])  Cancer ([address] bilateral breast  
 Chronic kidney disease  Diabetes mellitus type II   
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  
Procedure Laterality Date  ABDOMEN SURGERY PROC UNLISTED    
 gastric bypass  GASTRIC BYPASS,OBESE<150CM SAW-EN-Y  7/08  
 guan[place]ito FREED AFIB ABLATION    
  HX AMPUTATION FOOT Left 04/2020  HX BREAST RECONSTRUCTION Bilateral   
 HX CARPAL TUNNEL RELEASE [address] [facility] [address] RIGHT MODIFIED RADICAL   
 HX MASTECTOMY Left   
 no lymphnode removal  
 HX MOHS PROCEDURES  1995  
 right  HX ORTHOPAEDIC Right   
 knee surgery  HX PACEMAKER  11/17/2020 Dr. Hines Husbands. Insertion of permanent pacemaker. AV node ablation  HX PACEMAKER    
 IR INSERT TUNL CVC W PORT OVER 5 YEARS    
 IR INSERT TUNL CVC W/O PORT OVER 5 YR  5/4/2020  IR INSERT TUNL CVC W/O PORT OVER 5 YR  9/8/2020  IR REMOVE TUNL CVAD W/O PORT / PUMP  6/26/2020  IR REMOVE TUNL CVAD W/O PORT / PUMP  10/19/2020  [address] [place] [address] [address]  8.21.07  
 TN [address] AND 1994  TN RELOCATION OF SKIN POCKET FOR PACEMAKER N/A 4/24/2020 RELOCATE 2 [facility] performed by Mimi Tucker [place] [address] [address] CATH LAB  TN RMVL TRANSVNS PM ELTRD 1 LEAD SYS ATR/VENTR N/A 4/24/2020 Remove Pacemaker Dual Leads performed by Mimi Tucker MD at [facility] CARDIAC CATH LAB  TN RMVL TRANSVNS PM ELTRD 1 LEAD SYS ATR/VENTR N/A 4/27/2020 REMOVE PACEMAKER DUAL LEADS [place] Mimi [place] [address] [address] CATH LAB  TN TRABECULOPLASTY BY LASER SURGERY    
 US GUIDE ASP OVARIAN CYST ABD APPROACH  8.21.07  
 RIGHT Social History Socioeconomic History  Marital status:  Spouse name: Not on file  Number of children: Not on file  Years of education: Not on file  Highest education level: Not on file Occupational History  Not on file Social Needs  Financial resource strain: Not on file  Food insecurity Worry: Not on file Inability: Not on file  Transportation needs Medical: Not on file Non-medical: Not on file Tobacco Use  Smoking status: Never Smoker  Smokeless tobacco: Never Used Substance and Sexual Activity  Alcohol use: Not Currently  Drug use: No  
 Sexual activity: Not on file Lifestyle  Physical activity Days per week: Not on file Minutes per session: Not on file  Stress: Not on file Relationships  Social connections Talks on phone: Not on file Gets together: Not on file Attends [employer] service: Not on file Active member of club or organization: Not on file Attends meetings of clubs or organizations: Not on file Relationship status: Not on file  Intimate partner violence Fear of current or ex partner: Not on file Emotionally abused: Not on file Physically abused: Not on file Forced sexual activity: Not on file Other Topics Concern  Not on file Social History Narrative  Not on file Current Facility-Administered Medications Medication Dose Route Frequency  ondansetron (ZOFRAN) injection 4 mg  4 mg IntraVENous Q6H PRN  
 warfarin (COUMADIN) tablet 2 mg  2 mg Oral ONCE  
 colchicine tablet 0.6 mg  0.6 mg Oral BID  
 HYDROmorphone (PF) (DILAUDID) injection 1 mg  1 mg IntraVENous Q3H PRN  
 levoFLOXacin (LEVAQUIN) 750 mg in D5W IVPB  750 mg IntraVENous Q48H  Warfarin - pharmacy to dose   Other Rx Dosing/Monitoring  bumetanide (BUMEX) injection 1 mg  1 mg IntraVENous BID  hydrALAZINE (APRESOLINE) tablet 100 mg  100 mg Oral TID  metoprolol tartrate (LOPRESSOR) tablet 25 mg  25 mg Oral BID  
 oxyCODONE-acetaminophen (PERCOCET) 5-325 mg per tablet 2 Tab  2 Tab Oral Q6H PRN Objective:  
  
Physical Exam: 
Visit Vitals /74 Pulse 90 Temp 98.1 °F (36.7 °C) Resp 16 Ht 5' 10\" (1.778 m) Wt 259 lb (117.5 kg) SpO2 96% BMI 37.16 kg/m² General Appearance:  Well developed, well nourished,alert and oriented x 3, and individual in no acute distress. Ears/Nose/Mouth/Throat:   Hearing grossly normal. 
  
    Neck: Supple. Chest:   Lungs clear to auscultation bilaterally. Cardiovascular:  Regular rhythm, no murmur. Abdomen:   Soft, obese Extremities: Left BKA Skin: Right pocket swelling and red and draining on dressing Data Review:  
Labs:   
Recent Results (from the past 24 hour(s)) METABOLIC PANEL, BASIC Collection Time: 11/27/20  2:14 AM  
Result Value Ref Range Sodium 138 136 - 145 mmol/L Potassium 3.8 3.5 - 5.1 mmol/L Chloride 107 97 - 108 mmol/L  
 CO2 23 21 - 32 mmol/L Anion gap 8 5 - 15 mmol/L Glucose 125 (H) 65 - 100 mg/dL BUN 20 6 - 20 MG/DL Creatinine 1.73 (H) 0.55 - 1.02 MG/DL  
 BUN/Creatinine ratio 12 12 - 20 GFR est AA 36 (L) >60 ml/min/1.73m2 GFR est non-AA 30 (L) >60 ml/min/1.73m2 Calcium 8.4 (L) 8.5 - 10.1 MG/DL PROTHROMBIN TIME + INR Collection Time: 11/27/20  2:14 AM  
Result Value Ref Range INR 1.3 (H) 0.9 - 1.1 Prothrombin time 13.8 (H) 9.0 - 11.1 sec CBC WITH AUTOMATED DIFF Collection Time: 11/27/20  4:00 AM  
Result Value Ref Range WBC 6.4 3.6 - 11.0 K/uL  
 RBC 3.41 (L) 3.80 - 5.20 M/uL HGB 8.1 (L) 11.5 - 16.0 g/dL HCT 27.8 (L) 35.0 - 47.0 % MCV 81.5 80.0 - 99.0 FL  
 MCH 23.8 (L) 26.0 - 34.0 PG  
 MCHC 29.1 (L) 30.0 - 36.5 g/dL  
 RDW 17.9 (H) 11.5 - 14.5 % PLATELET 271 266 - 032 K/uL MPV 11.2 8.9 - 12.9 FL  
 NRBC 2.0 (H) 0  WBC ABSOLUTE NRBC 0.13 (H) 0.00 - 0.01 K/uL NEUTROPHILS 67 32 - 75 % LYMPHOCYTES 11 (L) 12 - 49 % MONOCYTES 18 (H) 5 - 13 % EOSINOPHILS 2 0 - 7 % BASOPHILS 1 0 - 1 % IMMATURE GRANULOCYTES 1 (H) 0.0 - 0.5 % ABS. NEUTROPHILS 4.2 1.8 - 8.0 K/UL  
 ABS. LYMPHOCYTES 0.7 (L) 0.8 - 3.5 K/UL  
 ABS. MONOCYTES 1.2 (H) 0.0 - 1.0 K/UL  
 ABS. EOSINOPHILS 0.1 0.0 - 0.4 K/UL  
 ABS. BASOPHILS 0.1 0.0 - 0.1 K/UL  
 ABS. IMM. GRANS. 0.1 (H) 0.00 - 0.04 K/UL  
 DF SMEAR SCANNED    
 RBC COMMENTS POLYCHROMASIA 1+ 
    
 RBC COMMENTS ANISOCYTOSIS 
1+ 
    
 RBC COMMENTS OVALOCYTES PRESENT 
    
 RBC COMMENTS HYPOCHROMIA 1+ ECHO ADULT COMPLETE Collection Time: 11/27/20 10:39 AM  
Result Value Ref Range IVSd 0.94 (A) 0.6 - 0.9 cm LVIDd 3.99 3.9 - 5.3 cm LVIDs 2.74 cm  
 LVOT d 1.95 cm  
 LVPWd 1.03 (A) 0.6 - 0.9 cm  
 LVOT Peak Gradient 2.01 mmHg LVOT SV 34.7 mL  
 LVOT Peak Velocity 70.92 cm/s LVOT VTI 11.57 cm Left Atrium Major Axis 4.36 cm  
 LA Volume 74.94 22 - 52 mL  
 LA Area 4C 24.47 cm2 LA Vol 2C 57.78 (A) 22 - 52 mL  
 LA Vol 4C 80.09 (A) 22 - 52 mL Aortic Valve Area by Continuity of Peak Velocity 1.33 cm2 Aortic Valve Area by Continuity of VTI 1.34 cm2 AoV PG 10.23 mmHg Aortic Valve Systolic Mean Gradient 0.09 mmHg Aortic Valve Systolic Peak Velocity 526.71 cm/s AoV VTI 25.84 cm  
 MVA VTI 1.15 cm2 MV Peak Gradient 8.05 mmHg MV Mean Gradient 4.08 mmHg Mitral Valve Max Velocity 141.89 cm/s Mitral Valve Annulus Velocity Time Integral 30.04 cm Pulmonic Valve Systolic Peak Instantaneous Gradient 3.20 mmHg Pulmonic Valve Max Velocity 89.50 cm/s Tapse 1.71 1.5 - 2.0 cm Triscuspid Valve Regurgitation Peak Gradient 34.52 mmHg  
 TR Max Velocity 293.77 cm/s Ao Root D 3.00 cm LV Mass .9 67 - 162 g  
 LV Mass AL Index 53.2 43 - 95 g/m2 Left Atrium Minor Axis 1.87 cm  
 LA Vol Index 32.17 16 - 28 ml/m2 LA Vol Index 24.80 16 - 28 ml/m2 LA Vol Index 34.38 16 - 28 ml/m2 JASON/BSA Pk Jeff 0.6 cm2/m2 JASON/BSA VTI 0.6 cm2/m2 Telemetry: AFIB ventricular pacing Assessment:  
 
Active Problems: 
  Chest pain (11/23/2020) Hx of pacer pocket infection and removal 
 
BKA-left CKD AFIB AV node ablation Leadless pacemaker Hypertension DM Anasarca. Plan:  
Thoracic surgery had done exploration of the right chest wound 11/25/2020 and no abscess but it continues drain and swell Her chest pain is felt lower than the incision site of the right upper chest 
 Her chest CT showed small pericardial effusion. I think she has pericarditis Troponin is flat 0.11 and does not represent NSTEMI Echo still pending but to be done today Will increase colchicine to 0.6 mg bid for pericarditis Cannot take NSAIDS due to warfarin and CKD If this does not resolve with conservative management, will need to explore further, including removal and reimplant the leadless pacemaker. Elizabeth Wadsworth M.D. [facility] Electrophysiology/Cardiology [facility] [place] [address]                             
[phone]

## 2020-11-27 NOTE — PROGRESS NOTES
Pharmacist Note  Warfarin Dosing Consult provided for this 63 y. o.female to manage warfarin for Atrial Fibrillation INR Goal: 2 - 3 Home regimen/ tablet size: 2 mg M-W-F; 1 mg Tu/Th/Sat/ Sun 
 
Drugs that may increase INR: Levofloxacin Drugs that may decrease INR: None Other current anticoagulants/ drugs that may increase bleeding risk: None Risk factors: None Daily INR ordered: YES Recent Labs  
  11/27/20 
0400 11/27/20 
0214 11/26/20 
0439 HGB 8.1*  --   --   
INR  --  1.3* 1.5* Date               INR                  Dose 
11/23  1.9  Held 
11/24                --                    Held 
11/25               --                     2 mg  
11/26              1.5                   1 mg  
11/27              1.3                   2 mg Assessment/ Plan: Will order warfarin 2 mg PO x 1 dose - continuation of home dose, held previously as the patient underwent exploratory thoracic surgery on 11/25 Pharmacy will continue to monitor daily and adjust therapy as indicated. Please contact the pharmacist at  for outpatient recommendations if needed.

## 2020-11-27 NOTE — PROGRESS NOTES
Bedside and Verbal shift change report given to GURINDER Richey (oncoming nurse) by Lizzy Bello (offgoing nurse). Report included the following information SBAR, Kardex, OR Summary, Procedure Summary, Intake/Output, MAR, Recent Results and Cardiac Rhythm Carmen,GURINDER.

## 2020-11-28 NOTE — PROGRESS NOTES
Bedside and Verbal shift change report given to [place] Raul (oncoming nurse) by GURINDER Tobin (offgoing nurse). Report included the following information SBAR, Kardex, ED Summary, OR Summary, Procedure Summary, Intake/Output, MAR and Cardiac Rhythm Paced.

## 2020-11-28 NOTE — PROGRESS NOTES
Bedside shift change report given to Nini Brown RN (oncoming nurse) by Elvis Talbert RN (offgoing nurse). Report included the following information SBAR, Kardex, [facility], Recent Results and Cardiac Rhythm Paced.

## 2020-11-28 NOTE — PROGRESS NOTES
Cardiology Progress Note 
    
 
11/28/2020 10:51 AM 
 
Admit Date: 11/23/2020 Admit Diagnosis: Chest pain [R07.9] Subjective: Jarvis Oneil still has chest pain right side of sternum, 1 hand below the right upper chest incision But it is better and to me she looks less fatigue than earlier in the week Pocket on right chest is draining; 8/2020 resection of the right sternoclavicular joint 08/20/20 ECHO ADULT COMPLETE 08/28/2020 8/28/2020 Narrative · LV: Estimated LVEF is 55 - 60%. Visually measured ejection fraction. Normal cavity size and systolic function (ejection fraction normal). Mild  
concentric hypertrophy. Wall motion: normal. Normal left ventricular  
strain. · LA: Moderately dilated left atrium. · AV: Aortic valve leaflet calcification present without reduced  
excursion. · MV: Mitral valve non-specific thickening. Signed by: Marge Velazco MD  
 
Chest CT Small pericardial effusion and pleural effusions Past Medical History:  
Diagnosis Date  Acquired lymphedema Right arm  Arrhythmia  Asthma  Atrial fibrillation ([address] Dr. Tamika Jeffrey and [facility])  Cancer ([address] bilateral breast  
 Chronic kidney disease  Diabetes mellitus type II   
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  
Procedure Laterality Date  ABDOMEN SURGERY PROC UNLISTED    
 gastric bypass  GASTRIC BYPASS,OBESE<150CM SAW-EN-Y  7/08  
 [facility]  HX AFIB ABLATION    
 HX AMPUTATION FOOT Left 04/2020  HX BREAST RECONSTRUCTION Bilateral   
 HX CARPAL TUNNEL RELEASE [address] [id number] RIGHT MODIFIED RADICAL   
 HX MASTECTOMY Left   
 no lymphnode removal  
 HX MOHS PROCEDURES  1995  
 right  HX ORTHOPAEDIC Right   
 knee surgery  HX PACEMAKER  11/17/2020 Dr. Zheng Burns. Insertion of permanent pacemaker. AV node ablation  HX PACEMAKER    
 IR INSERT TUNL CVC W PORT OVER 5 YEARS    
 IR INSERT TUNL CVC W/O PORT OVER 5 YR  5/4/2020  IR INSERT TUNL CVC W/O PORT OVER 5 YR  9/8/2020  IR REMOVE TUNL CVAD W/O PORT / PUMP  6/26/2020  IR REMOVE TUNL CVAD W/O PORT / PUMP  10/19/2020  [address] [facility] [address]  8.21.07  
 NH [address] AND 1994  NH RELOCATION OF SKIN POCKET FOR PACEMAKER N/A 4/24/2020 RELOCATE 2 [facility] performed by Sai Cohen MD at [address] CATH LAB  NH RMVL TRANSVNS PM ELTRD 1 LEAD SYS ATR/VENTR N/A 4/24/2020 Remove Pacemaker Dual Leads performed by Sai Cohen MD at [facility] C[facility] CATH LAB  NH RMVL TRANSVNS PM ELTRD 1 LEAD SYS ATR/VENTR N/A 4/27/2020 REMOVE PACEMAKER DUAL LEADS performed by Sai Cohen MD at [address] CATH LAB  NH TRABECULOPLASTY BY LASER SURGERY    
 US GUIDE ASP OVARIAN CYST ABD APPROACH  8.21.07  
 RIGHT Social History Socioeconomic History  Marital status:  Spouse name: Not on file  Number of children: Not on file  Years of education: Not on file  Highest education level: Not on file Occupational History  Not on file Social Needs  Financial resource strain: Not on file  Food insecurity Worry: Not on file Inability: Not on file  Transportation needs Medical: Not on file Non-medical: Not on file Tobacco Use  Smoking status: Never Smoker  Smokeless tobacco: Never Used Substance and Sexual Activity  Alcohol use: Not Currently  Drug use: No  
 Sexual activity: Not on file Lifestyle  Physical activity Days per week: Not on file Minutes per session: Not on file  Stress: Not on file Relationships  Social connections Talks on phone: Not on file Gets together: Not on file Attends Sikhism service: Not on file Active member of club or organization: Not on file Attends meetings of clubs or organizations: Not on file Relationship status: Not on file  Intimate partner violence Fear of current or ex partner: Not on file Emotionally abused: Not on file Physically abused: Not on file Forced sexual activity: Not on file Other Topics Concern  Not on file Social History Narrative  Not on file Current Facility-Administered Medications Medication Dose Route Frequency  warfarin (COUMADIN) tablet 2 mg  2 mg Oral ONCE  
 ondansetron (ZOFRAN) injection 4 mg  4 mg IntraVENous Q6H PRN  
 colchicine tablet 0.6 mg  0.6 mg Oral BID  
 HYDROmorphone (PF) (DILAUDID) injection 1 mg  1 mg IntraVENous Q3H PRN  
 levoFLOXacin (LEVAQUIN) 750 mg in D5W IVPB  750 mg IntraVENous Q48H  Warfarin - pharmacy to dose   Other Rx Dosing/Monitoring  bumetanide (BUMEX) injection 1 mg  1 mg IntraVENous BID  hydrALAZINE (APRESOLINE) tablet 100 mg  100 mg Oral TID  metoprolol tartrate (LOPRESSOR) tablet 25 mg  25 mg Oral BID  
 oxyCODONE-acetaminophen (PERCOCET) 5-325 mg per tablet 2 Tab  2 Tab Oral Q6H PRN Objective:  
  
Physical Exam: 
Visit Vitals /66 (BP 1 Location: Left arm, BP Patient Position: At rest) Pulse 90 Temp 98.2 °F (36.8 °C) Resp 16 Ht 5' 10\" (1.778 m) Wt 255 lb 9.6 oz (115.9 kg) SpO2 97% BMI 36.67 kg/m² General Appearance:  Well developed, well nourished,alert and oriented x 3, and individual in no acute distress. Ears/Nose/Mouth/Throat:   Hearing grossly normal. 
  
    Neck: Supple. Chest:   Lungs clear to auscultation bilaterally. Cardiovascular:  Regular rhythm, no murmur. Abdomen:   Soft, obese Extremities: Left BKA Skin: Right pocket swelling and red and draining on dressing Data Review:  
Labs:   
Recent Results (from the past 24 hour(s)) METABOLIC PANEL, BASIC Collection Time: 11/28/20  4:04 AM  
Result Value Ref Range Sodium 137 136 - 145 mmol/L Potassium 3.7 3.5 - 5.1 mmol/L Chloride 106 97 - 108 mmol/L  
 CO2 25 21 - 32 mmol/L Anion gap 6 5 - 15 mmol/L Glucose 139 (H) 65 - 100 mg/dL BUN 19 6 - 20 MG/DL Creatinine 1.66 (H) 0.55 - 1.02 MG/DL  
 BUN/Creatinine ratio 11 (L) 12 - 20 GFR est AA 38 (L) >60 ml/min/1.73m2 GFR est non-AA 31 (L) >60 ml/min/1.73m2 Calcium 8.4 (L) 8.5 - 10.1 MG/DL PROTHROMBIN TIME + INR Collection Time: 11/28/20  4:04 AM  
Result Value Ref Range INR 1.4 (H) 0.9 - 1.1 Prothrombin time 14.4 (H) 9.0 - 11.1 sec Telemetry: AFIB ventricular pacing Assessment:  
 
Active Problems: 
  Chest pain (11/23/2020) Hx of pacer pocket infection and removal 
 
BKA-left CKD AFIB AV node ablation Leadless pacemaker Hypertension DM Anasarca. Plan:  
Thoracic surgery had done exploration of the right chest wound 11/25/2020 and no abscess but it continues drain and swell Her chest pain is felt lower than the incision site of the right upper chest 
Her chest CT showed small pericardial effusion. I think she has pericarditis Troponin is flat 0.11 and does not represent NSTEMI 
11/23/20 ECHO ADULT COMPLETE 11/27/2020 11/27/2020 Narrative · LV: Estimated LVEF is 50 - 55%. Visually measured ejection fraction. Normal cavity size and systolic function (ejection fraction normal). Mild  
concentric hypertrophy. Abnormal left ventricular septal motion consistent with right ventricular pacing. · LA: Moderately dilated left atrium. · RA: Mildly dilated right atrium. · AV: Aortic valve leaflet calcification present without reduced  
excursion. · MV: Mitral valve non-specific thickening. Moderate mitral annular  
calcification. Mild mitral valve stenosis is present. · TV: Mild tricuspid valve regurgitation is present. Signed by: Justyna Blackmon MD  
  
Now on  colchicine to 0.6 mg bid for pericarditis  With improvement, will check back on Monday Cannot take NSAIDS due to warfarin and CKD Gokul Kitchen M.D. [facility] Electrophysiology/Cardiology [address] [place] [address] and [facility] [place] [address] [place] [address]                             
[phone]

## 2020-11-28 NOTE — PROGRESS NOTES
Problem: Risk for Spread of Infection Goal: Prevent transmission of infectious organism to others Description: Prevent the transmission of infectious organisms to other patients, staff members, and visitors. Outcome: Progressing Towards Goal 
  
Problem: Falls - Risk of 
Goal: *Absence of Falls Description: Document Lemon Omar Fall Risk and appropriate interventions in the flowsheet. Outcome: Progressing Towards Goal 
Note: Fall Risk Interventions: 
Mobility Interventions: Communicate number of staff needed for ambulation/transfer, Patient to call before getting OOB Medication Interventions: Teach patient to arise slowly Elimination Interventions: Call light in reach

## 2020-11-28 NOTE — PROGRESS NOTES
[address] [facility] Adult  Hospitalist Group Hospitalist Progress Note Lydia Arora MD 
Answering service: [phone] OR [phone] from in house phone Progress Note Patient: Pierre Sotomayor MRN: [id number]  SSN: [id number] YOB: 1960  Age: 63 y.o. Sex: female Admit Date: 11/23/2020 LOS: 5 days Subjective:  
 
Patient presents with chest pain, s/p exploration of the right clavicular area 11/25 for concern of fluid collection, but no infection or bleeding was found. Patient also with fluid overload currently being diuresed. Still complaining of right-sided chest pain, but improving. Also patient is nauseated without much p.o. intake. Denies any abdominal pain. Objective:  
 
Vitals:  
 11/27/20 2312 11/28/20 0401 11/28/20 0849 11/28/20 1214 BP: 108/60 122/61 128/66 125/70 Pulse: 91 90 90 90 Resp: 16 18 16 16 Temp: 98.3 °F (36.8 °C) 98.2 °F (36.8 °C) 98.2 °F (36.8 °C) 98.7 °F (37.1 °C) SpO2: 96% 98% 97% 92% Weight:  115.9 kg (255 lb 9.6 oz) Height:      
  
 
Intake and Output: 
Current Shift: 11/28 0701 - 11/28 1900 In: -  
Out: 750 [Urine:750] Last three shifts: 11/26 1901 - 11/28 0700 In: 750 [P.O.:750] Out: 2750 [Urine:2750] Physical Exam:  
GENERAL: alert, cooperative, no distress, appears stated age THROAT & NECK: normal and no erythema or exudates noted. LUNG: clear to auscultation bilaterally BREAST: Patient has large hard mass on the right breast occupying bilateral upper quadrants HEART: regular rate and rhythm, S1, S2 normal, no murmur, click, rub or gallop ABDOMEN: soft, non-tender. Bowel sounds normal. No masses,  no organomegaly EXTREMITIES:  extremities normal, atraumatic, no cyanosis or edema SKIN: no rash or abnormalities NEUROLOGIC: AOx3.  Gait normal. Reflexes and motor strength normal and symmetric. Cranial nerves 2-12 and sensation grossly intact. PSYCHIATRIC: non focal 
 
Lab/Data Review: All lab results for the last 24 hours reviewed. Recent Results (from the past 24 hour(s)) METABOLIC PANEL, BASIC Collection Time: 11/28/20  4:04 AM  
Result Value Ref Range Sodium 137 136 - 145 mmol/L Potassium 3.7 3.5 - 5.1 mmol/L Chloride 106 97 - 108 mmol/L  
 CO2 25 21 - 32 mmol/L Anion gap 6 5 - 15 mmol/L Glucose 139 (H) 65 - 100 mg/dL BUN 19 6 - 20 MG/DL Creatinine 1.66 (H) 0.55 - 1.02 MG/DL  
 BUN/Creatinine ratio 11 (L) 12 - 20 GFR est AA 38 (L) >60 ml/min/1.73m2 GFR est non-AA 31 (L) >60 ml/min/1.73m2 Calcium 8.4 (L) 8.5 - 10.1 MG/DL PROTHROMBIN TIME + INR Collection Time: 11/28/20  4:04 AM  
Result Value Ref Range INR 1.4 (H) 0.9 - 1.1 Prothrombin time 14.4 (H) 9.0 - 11.1 sec Imaging: No results found. Assessment and Plan:  
 
Chest pain 
-Atypical chest pain, likely musculoskeletal related to recent surgery in the right clavicular area for osteomyelitis 
-Underwent exploration of right anterior chest wall wound bed 11/25 per thoracic surgery 
-No finding of abscess, infection or hematoma 
-Antibiotic discontinued 
-Echo with normal EF 
-Trial of colchicine for pericarditis, with improving symptoms Anasarca 
-Being diuresed with Bumex 1 mg twice daily Nausea 
-Monitor symptoms, if not improved, check KUB Urinary tract infection 
-Urine culture grew Enterobacter 
-Continue Rocephin Elevated troponin 
-No clinical signs and symptoms of acute coronary syndrome 
-Echocardiogram pending 
-Cardiology evaluating Hypertension 
-Continue hydralazine and metoprolol 
-Blood pressure stable Atrial fibrillation 
-Paroxysmal atrial fibrillation, s/p AV node ablation 
-Prior history of pacemaker pocket infection with subsequent removal, and replacement with leadless pacemaker 
-On Coumadin for anticoagulation Anemia -Normocytic anemia 
-H&H stable, continuemonitor Chronic kidney disease stage IV 
-Renal function stable, continue monitor Anticipated disposition is 24 to 48 hours pending progress. Signed By: Fuentes Lorenzo MD   
 November 28, 2020

## 2020-11-28 NOTE — PROGRESS NOTES
Pharmacist Note  Warfarin Dosing Consult provided for this 63 y. o.female to manage warfarin for Atrial Fibrillation INR Goal: 2 - 3 Home regimen/ tablet size: 2 mg M-W-F; 1 mg Tu/Th/Sat/ Sun 
 
Drugs that may increase INR: Levofloxacin Drugs that may decrease INR: None Other current anticoagulants/ drugs that may increase bleeding risk: None Risk factors: None Daily INR ordered: YES Recent Labs  
  11/28/20 
0404 11/27/20 
0400 11/27/20 
0214 11/26/20 
6043 HGB  --  8.1*  --   --   
INR 1.4*  --  1.3* 1.5* Date               INR                  Dose 
11/23  1.9  Held 
11/24                --                    Held 
11/25               --                     2 mg  
11/26              1.5                   1 mg  
11/27              1.3                   2 mg 
11/28  1.4  2 mg Assessment/ Plan: 
INR remains subtherapeutic after held for surgery. Will order warfarin 2 mg PO x 1 dose. Plan to resume home dose when INR therapeutic. Pharmacy will continue to monitor daily and adjust therapy as indicated. Please contact the pharmacist at  for outpatient recommendations if needed.

## 2020-11-28 NOTE — PROGRESS NOTES
Problem: Falls - Risk of 
Goal: *Absence of Falls Description: Document Albert Click Fall Risk and appropriate interventions in the flowsheet. Outcome: Progressing Towards Goal 
Note: Fall Risk Interventions: 
Mobility Interventions: Communicate number of staff needed for ambulation/transfer Medication Interventions: Teach patient to arise slowly, Patient to call before getting OOB Elimination Interventions: Call light in reach, Toileting schedule/hourly rounds

## 2020-11-29 NOTE — PROGRESS NOTES
[address] [facility] Adult  Hospitalist Group Hospitalist Progress Note Felton Gowers, MD 
Answering service: [phone] OR [phone] from in house phone Progress Note Patient: Kev Curran MRN: [id number]  SSN: [id number] YOB: 1960  Age: 63 y.o. Sex: female Admit Date: 11/23/2020 LOS: 6 days Subjective:  
 
Patient presents with chest pain, s/p exploration of the right clavicular area 11/25 for concern of fluid collection, but no infection or bleeding was found. Patient also with fluid overload currently being diuresed. Still complaining of right-sided chest pain, but improving. Her nausea is improved. Tolerating p.o. intake. Objective:  
 
Vitals:  
 11/28/20 2146 11/29/20 0308 11/29/20 0325 11/29/20 6404 BP: 131/67 (!) 151/78  (!) 148/81 Pulse: 92 91  91 Resp: 20 22 20 Temp: 98.2 °F (36.8 °C) 97.9 °F (36.6 °C)  97.6 °F (36.4 °C) SpO2: 95% 97%  96% Weight:   116.2 kg (256 lb 3.2 oz) Height:      
  
 
Intake and Output: 
Current Shift: No intake/output data recorded. Last three shifts: 11/27 1901 - 11/29 0700 In: 660 [P.O.:360; I.V.:300] Out: 2650 [Urine:2650] Physical Exam:  
GENERAL: alert, cooperative, no distress, appears stated age THROAT & NECK: normal and no erythema or exudates noted. LUNG: clear to auscultation bilaterally BREAST: Patient has large hard mass on the right breast occupying bilateral upper quadrants HEART: regular rate and rhythm, S1, S2 normal, no murmur, click, rub or gallop ABDOMEN: soft, non-tender. Bowel sounds normal. No masses,  no organomegaly EXTREMITIES:  extremities normal, atraumatic, no cyanosis or edema SKIN: no rash or abnormalities NEUROLOGIC: AOx3. Gait normal. Reflexes and motor strength normal and symmetric. Cranial nerves 2-12 and sensation grossly intact.  
PSYCHIATRIC: non focal 
 
 Lab/Data Review: All lab results for the last 24 hours reviewed. Recent Results (from the past 24 hour(s)) PROTHROMBIN TIME + INR Collection Time: 11/29/20  3:10 AM  
Result Value Ref Range INR 1.5 (H) 0.9 - 1.1 Prothrombin time 15.5 (H) 9.0 - 11.1 sec METABOLIC PANEL, BASIC Collection Time: 11/29/20  3:10 AM  
Result Value Ref Range Sodium 136 136 - 145 mmol/L Potassium 3.6 3.5 - 5.1 mmol/L Chloride 104 97 - 108 mmol/L  
 CO2 22 21 - 32 mmol/L Anion gap 10 5 - 15 mmol/L Glucose 159 (H) 65 - 100 mg/dL BUN 19 6 - 20 MG/DL Creatinine 1.80 (H) 0.55 - 1.02 MG/DL  
 BUN/Creatinine ratio 11 (L) 12 - 20 GFR est AA 35 (L) >60 ml/min/1.73m2 GFR est non-AA 29 (L) >60 ml/min/1.73m2 Calcium 8.7 8.5 - 10.1 MG/DL  
CBC WITH AUTOMATED DIFF Collection Time: 11/29/20  3:10 AM  
Result Value Ref Range WBC 6.6 3.6 - 11.0 K/uL  
 RBC 3.57 (L) 3.80 - 5.20 M/uL HGB 8.4 (L) 11.5 - 16.0 g/dL HCT 28.2 (L) 35.0 - 47.0 % MCV 79.0 (L) 80.0 - 99.0 FL  
 MCH 23.5 (L) 26.0 - 34.0 PG  
 MCHC 29.8 (L) 30.0 - 36.5 g/dL  
 RDW 17.7 (H) 11.5 - 14.5 % PLATELET 464 944 - 229 K/uL MPV 10.3 8.9 - 12.9 FL  
 NRBC 1.5 (H) 0  WBC ABSOLUTE NRBC 0.10 (H) 0.00 - 0.01 K/uL NEUTROPHILS 74 32 - 75 % LYMPHOCYTES 9 (L) 12 - 49 % MONOCYTES 14 (H) 5 - 13 % EOSINOPHILS 1 0 - 7 % BASOPHILS 1 0 - 1 % IMMATURE GRANULOCYTES 1 (H) 0.0 - 0.5 % ABS. NEUTROPHILS 4.8 1.8 - 8.0 K/UL  
 ABS. LYMPHOCYTES 0.6 (L) 0.8 - 3.5 K/UL  
 ABS. MONOCYTES 0.9 0.0 - 1.0 K/UL  
 ABS. EOSINOPHILS 0.1 0.0 - 0.4 K/UL  
 ABS. BASOPHILS 0.1 0.0 - 0.1 K/UL  
 ABS. IMM. GRANS. 0.1 (H) 0.00 - 0.04 K/UL  
 DF SMEAR SCANNED    
 RBC COMMENTS OVALOCYTES PRESENT 
    
 RBC COMMENTS ANISOCYTOSIS 1+ 
    
 RBC COMMENTS HYPOCHROMIA 1+ 
    
 RBC COMMENTS MICROCYTOSIS 
1+ Imaging: No results found. Assessment and Plan:  
 
Chest pain -Atypical chest pain, likely musculoskeletal related to recent surgery in the right clavicular area for osteomyelitis 
-Underwent exploration of right anterior chest wall wound bed 11/25 per thoracic surgery 
-No finding of abscess, infection or hematoma 
-Echo with normal EF 
-Trial of colchicine for pericarditis, with improving symptoms Anasarca 
-Being diuresed with Bumex 1 mg twice daily Nausea 
-Improving p.o. intake Urinary tract infection 
-Urine culture grew Enterobacter 
-Continue Levaquin Elevated troponin 
-No clinical signs and symptoms of acute coronary syndrome 
-Echocardiogram pending 
-Cardiology evaluating Hypertension 
-Continue hydralazine and metoprolol 
-Blood pressure stable Atrial fibrillation 
-Paroxysmal atrial fibrillation, s/p AV node ablation 
-Prior history of pacemaker pocket infection with subsequent removal, and replacement with leadless pacemaker 
-On Coumadin for anticoagulation, dosing per pharmacy Anemia 
-Normocytic anemia 
-H&H stable, continuemonitor Chronic kidney disease stage IV 
-Renal function stable, continue monitor Anticipated disposition is 24 to 48 hours pending progress. Signed By: Urvashi Kulkarni MD   
 November 29, 2020

## 2020-11-29 NOTE — PROGRESS NOTES
Bedside shift change report given to GURINDER Larios (oncoming nurse) by Carmencita Rain RN (offgoing nurse). Report included the following information SBAR, Kardex, Intake/Output, MAR and Cardiac Rhythm Paced.

## 2020-11-29 NOTE — PROGRESS NOTES
Problem: Risk for Spread of Infection Goal: Prevent transmission of infectious organism to others Description: Prevent the transmission of infectious organisms to other patients, staff members, and visitors. Outcome: Progressing Towards Goal 
  
Problem: Falls - Risk of 
Goal: *Absence of Falls Description: Document Reema Jefes Fall Risk and appropriate interventions in the flowsheet. Outcome: Progressing Towards Goal 
Note: Fall Risk Interventions: 
Mobility Interventions: Communicate number of staff needed for ambulation/transfer Medication Interventions: Evaluate medications/consider consulting pharmacy, Patient to call before getting OOB, Teach patient to arise slowly Elimination Interventions: Call light in reach, Patient to call for help with toileting needs Problem: Pressure Injury - Risk of 
Goal: *Prevention of pressure injury Description: Document Valdemar Scale and appropriate interventions in the flowsheet. Outcome: Progressing Towards Goal 
Note: Pressure Injury Interventions: 
Sensory Interventions: Minimize linen layers, Keep linens dry and wrinkle-free Moisture Interventions: Absorbent underpads, Internal/External urinary devices Activity Interventions: Increase time out of bed, Pressure redistribution bed/mattress(bed type) Mobility Interventions: Float heels, HOB 30 degrees or less, Pressure redistribution bed/mattress (bed type) Nutrition Interventions: Document food/fluid/supplement intake Friction and Shear Interventions: HOB 30 degrees or less, Minimize layers, Transfer aides:transfer board/Estefania lift/ceiling lift

## 2020-11-29 NOTE — PROGRESS NOTES
Problem: Risk for Spread of Infection Goal: Prevent transmission of infectious organism to others Description: Prevent the transmission of infectious organisms to other patients, staff members, and visitors. Outcome: Progressing Towards Goal 
  
Problem: Patient Education:  Go to Education Activity Goal: Patient/Family Education Outcome: Progressing Towards Goal 
  
Problem: Falls - Risk of 
Goal: *Absence of Falls Description: Document Brandee Ahuja Fall Risk and appropriate interventions in the flowsheet. Outcome: Progressing Towards Goal 
Note: Fall Risk Interventions: 
Mobility Interventions: Communicate number of staff needed for ambulation/transfer Medication Interventions: Evaluate medications/consider consulting pharmacy, Patient to call before getting OOB, Teach patient to arise slowly Elimination Interventions: Call light in reach, Patient to call for help with toileting needs Problem: Pressure Injury - Risk of 
Goal: *Prevention of pressure injury Description: Document Valdemar Scale and appropriate interventions in the flowsheet. Outcome: Progressing Towards Goal 
Note: Pressure Injury Interventions: 
Sensory Interventions: Minimize linen layers, Keep linens dry and wrinkle-free Moisture Interventions: Absorbent underpads, Internal/External urinary devices Activity Interventions: Increase time out of bed, Pressure redistribution bed/mattress(bed type) Mobility Interventions: Float heels, HOB 30 degrees or less, Pressure redistribution bed/mattress (bed type) Nutrition Interventions: Document food/fluid/supplement intake Friction and Shear Interventions: HOB 30 degrees or less, Minimize layers, Transfer aides:transfer board/Estefania lift/ceiling lift

## 2020-11-29 NOTE — PROGRESS NOTES
Bedside and Verbal shift change report given to Charter Eliud (oncoming nurse) by Oriana Ugalde (offgoing nurse). Report included the following information SBAR, Kardex, ED Summary, Procedure Summary, Intake/Output, MAR, Recent Results and Cardiac Rhythm Paced.

## 2020-11-29 NOTE — PROGRESS NOTES
Pharmacist Note  Warfarin Dosing Consult provided for this 63 y. o.female to manage warfarin for Atrial Fibrillation INR Goal: 2 - 3 Home regimen/ tablet size: 2 mg M-W-F; 1 mg Tu/Th/Sat/ Sun 
 
Drugs that may increase INR: Levofloxacin Drugs that may decrease INR: None Other current anticoagulants/ drugs that may increase bleeding risk: None Risk factors: None Daily INR ordered: YES Recent Labs  
  11/29/20 
0310 11/28/20 
0404 11/27/20 
0400 11/27/20 
0214 HGB 8.4*  --  8.1*  --   
INR 1.5* 1.4*  --  1.3* Date               INR                  Dose 
11/23  1.9  Held 
11/24                --                    Held 
11/25               --                     2 mg  
11/26              1.5                   1 mg  
11/27              1.3                   2 mg 
11/28  1.4  2 mg 
11/29  1.5  2.5 mg 
                                                                            
Assessment/ Plan: 
INR remains subtherapeutic after held for surgery. Will order warfarin 2.5 mg PO x 1 dose. Plan to resume home dose when INR therapeutic. Pharmacy will continue to monitor daily and adjust therapy as indicated. Please contact the pharmacist at  for outpatient recommendations if needed.

## 2020-11-30 NOTE — PROGRESS NOTES
23:28: Pt /86. Pt said she was unable to take her hydralazine earlier today due to the nausea. Pt was able to take the  PO hydralazine that was scanned at 22:38 at 23:28.  
 
23:40: Pt reports severe nausea and dry heaving. Zofran did not seem to help. Gave a one time dose of compazine. Pt stated she is feeling better. 0800: Bedside shift change report given to GURINDER Larios (oncoming nurse) by Rachel Gaytan RN (offgoing nurse). Report included the following information SBAR, Kardex, [facility], Recent Results and Cardiac Rhythm Paced.

## 2020-11-30 NOTE — PROGRESS NOTES
Pharmacist Note  Warfarin Dosing Consult provided for this 63 y. o.female to manage warfarin for Atrial Fibrillation INR Goal: 2 - 3 Home regimen/ tablet size: 2 mg M-W-F; 1 mg Tu/Th/Sat/ Sun 
 
Drugs that may increase INR: Levofloxacin Drugs that may decrease INR: None Other current anticoagulants/ drugs that may increase bleeding risk: None Risk factors: None Daily INR ordered: YES Recent Labs 11/30/20 
0345 11/29/20 
0310 11/28/20 
0404 HGB  --  8.4*  --   
INR 1.6* 1.5* 1.4* Date               INR                  Dose 
11/23               1.9                   Held 
11/24                --                     Held 
11/25                --                     2 mg  
11/26              1.5                    1 mg  
11/27              1.3                    2 mg 
11/28              1.4                    2 mg 
11/29              1.5                    2.5 mg 
11/30              1.6                    2.5 mg 
                                                                            
Assessment/ Plan: 
INR remains subtherapeutic after held for surgery. Will order warfarin 2.5 mg PO x 1 dose. Plan to resume home dose when INR therapeutic. Pharmacy will continue to monitor daily and adjust therapy as indicated. Please contact the pharmacist at  for outpatient recommendations if needed.

## 2020-11-30 NOTE — PROGRESS NOTES
Chart checked, pt cleared by nursing. Attempted to work with patient but she politely declined stating that she was too nauseated. She moved into long sit and stated that was all she could tolerate. Discussed with her nurse. Pt will follow and see as able and appropriate.  Keeley Milligan, PT

## 2020-11-30 NOTE — PROGRESS NOTES
[address] [facility] Adult  Hospitalist Group Hospitalist Progress Note Ruth Ann Champion MD 
Answering service: [phone] OR [phone] from in house phone Progress Note Patient: Juliocesar Juarez MRN: [id number]  SSN: [id number] YOB: 1960  Age: 63 y.o. Sex: female Admit Date: 11/23/2020 LOS: 7 days Subjective:  
 
Patient presents with chest pain, s/p exploration of the right clavicular area 11/25 for concern of fluid collection, but no infection or bleeding was found. Patient also with fluid overload currently being diuresed. Still complaining of right-sided chest pain, but improving. On and off nausea. Objective:  
 
Vitals:  
 11/29/20 2322 11/30/20 0343 11/30/20 0822 11/30/20 1146 BP: (!) 167/85 (!) 159/76 (!) 143/85 (!) 148/74 Pulse: 90 90 90 90 Resp: 22 18 16 16 Temp: 98.4 °F (36.9 °C) 98.6 °F (37 °C) 97.6 °F (36.4 °C) 97.6 °F (36.4 °C) SpO2: 95% 96% 98% 93% Weight:  114.6 kg (252 lb 9.6 oz) Height:      
  
 
Intake and Output: 
Current Shift: 11/30 0701 - 11/30 1900 In: 48 [P.O.:50] Out: 400 [Urine:400] Last three shifts: 11/28 1901 - 11/30 0700 In: 56 [P.O.:720; I.V.:300] Out: 2200 [Urine:2200] Physical Exam:  
GENERAL: alert, cooperative, no distress, appears stated age THROAT & NECK: normal and no erythema or exudates noted. LUNG: clear to auscultation bilaterally BREAST: Patient has large hard mass on the right breast occupying bilateral upper quadrants HEART: regular rate and rhythm, S1, S2 normal, no murmur, click, rub or gallop ABDOMEN: soft, non-tender. Bowel sounds normal. No masses,  no organomegaly EXTREMITIES:  extremities normal, atraumatic, no cyanosis or edema SKIN: no rash or abnormalities NEUROLOGIC: AOx3.  Gait normal. Reflexes and motor strength normal and symmetric. Cranial nerves 2-12 and sensation grossly intact. PSYCHIATRIC: non focal 
 
Lab/Data Review: All lab results for the last 24 hours reviewed. Recent Results (from the past 24 hour(s)) PROTHROMBIN TIME + INR Collection Time: 11/30/20  3:45 AM  
Result Value Ref Range INR 1.6 (H) 0.9 - 1.1 Prothrombin time 15.9 (H) 9.0 - 11.1 sec METABOLIC PANEL, BASIC Collection Time: 11/30/20  3:45 AM  
Result Value Ref Range Sodium 135 (L) 136 - 145 mmol/L Potassium 3.4 (L) 3.5 - 5.1 mmol/L Chloride 104 97 - 108 mmol/L  
 CO2 24 21 - 32 mmol/L Anion gap 7 5 - 15 mmol/L Glucose 126 (H) 65 - 100 mg/dL BUN 18 6 - 20 MG/DL Creatinine 1.70 (H) 0.55 - 1.02 MG/DL  
 BUN/Creatinine ratio 11 (L) 12 - 20 GFR est AA 37 (L) >60 ml/min/1.73m2 GFR est non-AA 31 (L) >60 ml/min/1.73m2 Calcium 8.4 (L) 8.5 - 10.1 MG/DL Imaging: No results found. Assessment and Plan:  
 
Chest pain 
-Atypical chest pain, likely musculoskeletal related to recent surgery in the right clavicular area for osteomyelitis 
-Underwent exploration of right anterior chest wall wound bed 11/25 per thoracic surgery 
-No finding of abscess, infection or hematoma 
-Echo with normal EF 
-Chest pain is improving Anasarca 
-Being diuresed with Bumex 1 mg twice daily Nausea 
-On and off nausea 
-Reevaluate in AM 
 
Urinary tract infection 
-Urine culture grew Enterobacter 
-Continue Levaquin Elevated troponin 
-No clinical signs and symptoms of acute coronary syndrome 
-Echocardiogram pending 
-Cardiology evaluating Hypertension 
-Continue hydralazine and metoprolol 
-Blood pressure stable Atrial fibrillation 
-Paroxysmal atrial fibrillation, s/p AV node ablation 
-Prior history of pacemaker pocket infection with subsequent removal, and replacement with leadless pacemaker 
-On Coumadin for anticoagulation, dosing per pharmacy Anemia 
-Normocytic anemia 
-H&H stable, continuemonitor Chronic kidney disease stage IV 
-Renal function stable, continue monitor Anticipated disposition is 24 to 48 hours pending progress. Signed By: Lydia Arora MD   
 November 30, 2020

## 2020-11-30 NOTE — TELEPHONE ENCOUNTER
Reaching out to get pt. To connect home monitor for pacer. Requested she call [employer] for help if needed or bring home machine to clinic for help at tomorrow's appt.

## 2020-11-30 NOTE — PROGRESS NOTES
Problem: Risk for Spread of Infection Goal: Prevent transmission of infectious organism to others Description: Prevent the transmission of infectious organisms to other patients, staff members, and visitors. Outcome: Progressing Towards Goal 
  
Problem: Falls - Risk of 
Goal: *Absence of Falls Description: Document Bhavanasina Thomas Fall Risk and appropriate interventions in the flowsheet. Outcome: Progressing Towards Goal 
Note: Fall Risk Interventions: 
Mobility Interventions: Patient to call before getting OOB Medication Interventions: Patient to call before getting OOB, Teach patient to arise slowly Elimination Interventions: Patient to call for help with toileting needs Problem: Pressure Injury - Risk of 
Goal: *Prevention of pressure injury Description: Document Valdemar Scale and appropriate interventions in the flowsheet. Outcome: Progressing Towards Goal 
Note: Pressure Injury Interventions: 
Sensory Interventions: Keep linens dry and wrinkle-free, Minimize linen layers Moisture Interventions: Absorbent underpads, Internal/External urinary devices Activity Interventions: Increase time out of bed, Pressure redistribution bed/mattress(bed type) Mobility Interventions: HOB 30 degrees or less, Pressure redistribution bed/mattress (bed type) Nutrition Interventions: Document food/fluid/supplement intake Friction and Shear Interventions: Minimize layers

## 2020-11-30 NOTE — PROGRESS NOTES
Bedside shift change report given to Vilma Malone (oncoming nurse) by Deena (offgoing nurse). Report included the following information SBAR, Kardex, Procedure Summary, MAR and Recent Results.

## 2020-11-30 NOTE — PROGRESS NOTES
Cardiology Progress Note 11/30/2020 10:51 AM 
 
Admit Date: 11/23/2020 Admit Diagnosis: Chest pain [R07.9] Subjective: Is seen today for Dr Jennifer Mahan still has chest pain that is relieved by colchicine bid. She had leadless pacemaker implanted 8/2020 resection of the right sternoclavicular joint 08/20/20 ECHO ADULT COMPLETE 08/28/2020 8/28/2020 Narrative · LV: Estimated LVEF is 55 - 60%. Visually measured ejection fraction. Normal cavity size and systolic function (ejection fraction normal). Mild  
concentric hypertrophy. Wall motion: normal. Normal left ventricular  
strain. · LA: Moderately dilated left atrium. · AV: Aortic valve leaflet calcification present without reduced  
excursion. · MV: Mitral valve non-specific thickening. Signed by: Andrea Koroma MD  
 
Chest CT Small pericardial effusion and pleural effusions Past Medical History:  
Diagnosis Date  Acquired lymphedema Right arm  Arrhythmia  Asthma  Atrial fibrillation ([address] Dr. David Sampson and [facility]  Cancer ([address] bilateral breast  
 Chronic kidney disease  Diabetes mellitus type II   
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  
Procedure Laterality Date  ABDOMEN SURGERY PROC UNLISTED    
 gastric bypass  GASTRIC BYPASS,OBESE<150CM SAW-EN-Y  7/08  
 [place] FREED AFIB ABLATION    
  HX AMPUTATION FOOT Left 04/2020  HX BREAST RECONSTRUCTION Bilateral   
 HX CARPAL TUNNEL RELEASE [address] [facility] [address] RIGHT MODIFIED RADICAL   
 HX MASTECTOMY Left   
 no lymphnode removal  
 HX MOHS PROCEDURES  1995  
 right  HX ORTHOPAEDIC Right   
 knee surgery  HX PACEMAKER  11/17/2020 Dr. Zheng Burns. Insertion of permanent pacemaker. AV node ablation  HX PACEMAKER    
 IR INSERT TUNL CVC W PORT OVER 5 YEARS    
 IR INSERT TUNL CVC W/O PORT OVER 5 YR  5/4/2020  IR INSERT TUNL CVC W/O PORT OVER 5 YR  9/8/2020  IR REMOVE TUNL CVAD W/O PORT / PUMP  6/26/2020  IR REMOVE TUNL CVAD W/O PORT / PUMP  10/19/2020  [address] [facility] [address]  8.21.07  
 MO [address] AND 1994  MO RELOCATION OF SKIN POCKET FOR PACEMAKER N/A 4/24/2020 RELOCATE 2 [facility] performed by Sai Cohen MD at [address] [facility] CATH LAB  MO RMVL TRANSVNS PM ELTRD 1 LEAD SYS ATR/VENTR N/A 4/24/2020 Remove Pacemaker Dual Leads performed by Sai Cohen MD at [facility] CARDI[facility] CATH LAB  MO RMVL TRANSVNS PM ELTRD 1 LEAD SYS ATR/VENTR N/A 4/27/2020 REMOVE PACEMAKER DUAL LEADS performed by Sai Cohen MD at [address] [facility] CATH LAB  MO TRABECULOPLASTY BY LASER SURGERY    
 US GUIDE ASP OVARIAN CYST ABD APPROACH  8.21.07  
 RIGHT Social History Socioeconomic History  Marital status:  Spouse name: Not on file  Number of children: Not on file  Years of education: Not on file  Highest education level: Not on file Occupational History  Not on file Social Needs  Financial resource strain: Not on file  Food insecurity Worry: Not on file Inability: Not on file  Transportation needs Medical: Not on file Non-medical: Not on file Tobacco Use  Smoking status: Never Smoker  Smokeless tobacco: Never Used Substance and Sexual Activity  Alcohol use: Not Currently  Drug use: No  
 Sexual activity: Not on file Lifestyle  Physical activity Days per week: Not on file Minutes per session: Not on file  Stress: Not on file Relationships  Social connections Talks on phone: Not on file Gets together: Not on file Attends Latter-day service: Not on file Active member of club or organization: Not on file Attends meetings of clubs or organizations: Not on file Relationship status: Not on file  Intimate partner violence Fear of current or ex partner: Not on file Emotionally abused: Not on file Physically abused: Not on file Forced sexual activity: Not on file Other Topics Concern  Not on file Social History Narrative  Not on file Current Facility-Administered Medications Medication Dose Route Frequency  warfarin (COUMADIN) tablet 2.5 mg  2.5 mg Oral ONCE  
 levoFLOXacin (LEVAQUIN) tablet 750 mg  750 mg Oral Q48H  
 ondansetron (ZOFRAN) injection 4 mg  4 mg IntraVENous Q4H PRN  
 colchicine tablet 0.6 mg  0.6 mg Oral BID  
 HYDROmorphone (PF) (DILAUDID) injection 1 mg  1 mg IntraVENous Q3H PRN  
 Warfarin - pharmacy to dose   Other Rx Dosing/Monitoring  bumetanide (BUMEX) injection 1 mg  1 mg IntraVENous BID  hydrALAZINE (APRESOLINE) tablet 100 mg  100 mg Oral TID  metoprolol tartrate (LOPRESSOR) tablet 25 mg  25 mg Oral BID  
 oxyCODONE-acetaminophen (PERCOCET) 5-325 mg per tablet 2 Tab  2 Tab Oral Q6H PRN Objective:  
  
Physical Exam: 
Visit Vitals BP (!) 148/74 (BP 1 Location: Left arm, BP Patient Position: At rest) Pulse 90 Temp 97.6 °F (36.4 °C) Resp 16 Ht 5' 10\" (1.778 m) Wt 252 lb 9.6 oz (114.6 kg) SpO2 93% BMI 36.24 kg/m² General Appearance:  Well developed, well nourished,alert and oriented x 3, and individual in no acute distress.   
Ears/Nose/Mouth/Throat:   Hearing grossly normal. 
  
 Neck: Supple. Chest:   Lungs clear to auscultation bilaterally. Cardiovascular:  Regular rhythm, no murmur. No rub Abdomen:   Soft, obese Extremities: Left BKA Skin: Right pocket swelling and red and draining on dressing Data Review:  
Labs:   
Recent Results (from the past 24 hour(s)) PROTHROMBIN TIME + INR Collection Time: 11/30/20  3:45 AM  
Result Value Ref Range INR 1.6 (H) 0.9 - 1.1 Prothrombin time 15.9 (H) 9.0 - 11.1 sec METABOLIC PANEL, BASIC Collection Time: 11/30/20  3:45 AM  
Result Value Ref Range Sodium 135 (L) 136 - 145 mmol/L Potassium 3.4 (L) 3.5 - 5.1 mmol/L Chloride 104 97 - 108 mmol/L  
 CO2 24 21 - 32 mmol/L Anion gap 7 5 - 15 mmol/L Glucose 126 (H) 65 - 100 mg/dL BUN 18 6 - 20 MG/DL Creatinine 1.70 (H) 0.55 - 1.02 MG/DL  
 BUN/Creatinine ratio 11 (L) 12 - 20 GFR est AA 37 (L) >60 ml/min/1.73m2 GFR est non-AA 31 (L) >60 ml/min/1.73m2 Calcium 8.4 (L) 8.5 - 10.1 MG/DL Telemetry: AFIB ventricular pacing Echo no pericardial effusion but prior chest CT did report small one Assessment:  
 
Active Problems: 
  Chest pain (11/23/2020) Hx of pacer pocket infection and removal 
 
BKA-left CKD AFIB AV node ablation Leadless pacemaker Hypertension DM Anasarca. Plan:  
Thoracic surgery had done exploration of the right chest wound 11/25/2020 and no abscess Her chest pain is felt lower than the incision site of the right upper chest 
Her chest CT showed small pericardial effusion. I think she has pericarditis Troponin is flat 0.11 and does not represent NSTEMI Echo did not show pericardial effusion  
colchicine to 0.6 mg bid for pericarditis seem to help She is now pain free Cannot take NSAIDS due to warfarin and CKD Therefore I do not recommend removal and reimplant another leadless pacemaker. She is concerned about diarrhea with colchicine May reduce it to 0.1 mg every day for 7 days and stop and see if pain resolved completely Marilee Miranda M.D. [facility] Electrophysiology/Cardiology [facility] [place] [address], [address]                             
[phone]

## 2020-11-30 NOTE — PROGRESS NOTES
11/30/2020 -  
TEE: 
- RUR: 32% 
- Disposition is TBD dependent on progression - potential discharge to own home with transport via spouse - ABX continue - Cardiac surgical plan is pending cardio input re: reimplantation of pacer CRM: Jeniffer Chaney, MPH, [address] [place] [address]; Z: [phone]

## 2020-11-30 NOTE — PROGRESS NOTES
Clinical Pharmacy Note: IV to PO Automatic Conversion Please note: Lashaun Nelson medication(s) (levofloxacin) has/have been changed from IV to PO based on the following critiera: 
 
Patient is taking scheduled oral medications Patient is tolerating tube feeds at goal rate or a full liquid, soft or regular diet This IV to PO conversion is based on the P&T approved automatic conversion policy for eligible patients. Please call with questions.

## 2020-12-01 NOTE — PROGRESS NOTES
Problem: Mobility Impaired (Adult and Pediatric) Goal: *Acute Goals and Plan of Care (Insert Text) Description: FUNCTIONAL STATUS PRIOR TO ADMISSION: Patient was modified independent using a rolling walker and left BKA prosthetic for functional mobility. HOME SUPPORT PRIOR TO ADMISSION: The patient lived with her  but generally but did not require assist. 
 
Physical Therapy Goals Initiated 11/27/2020 1. Patient will move from supine to sit and sit to supine  in bed with modified independence within 7 day(s). 2.  Patient will transfer from bed to chair and chair to bed with modified independence using the least restrictive device within 7 day(s). 3.  Patient will perform sit to stand with modified independence within 7 day(s). 4.  Patient will ambulate with modified independence for 50 feet with the least restrictive device within 7 day(s). Outcome: Progressing Towards Goal 
 PHYSICAL THERAPY TREATMENT Patient: Shirin Ferguson (76 y.o. female) Date: 12/1/2020 Diagnosis: Chest pain [R07.9] <principal problem not specified> Procedure(s) (LRB): 
EXPLORATION OF RIGHT CHEST PHLEGMON (Right) 6 Days Post-Op Precautions:   
Chart, physical therapy assessment, plan of care and goals were reviewed. ASSESSMENT Patient continues with skilled PT services and is progressing towards goals. Pt continues to be limited by nausea. She was agreeable to PT and came to sitting at the EOB and attempted to char[place] her prosthesis. She was unable to do so, likely because of edema on her distal left limb from her stump  not being properly positioned (had slid part way down her leg). We discussed the importance of being mindful of keeping an eye on the position of her stump . After several minutes of sitting at the EOB, she became nauseated and reported a need for Zofran. I requested it for her and per her request left her up in sitting.  Anticipate that when no longer nauseated and when her leg will fit into her prosthesis, that she will be able to make steady gains with mobility. PT will continue to assess and make recommendations regarding disposition. Current Level of Function Impacting Discharge (mobility/balance): mod I for supine to sit and good sitting balance Other factors to consider for discharge: none PLAN : 
Patient continues to benefit from skilled intervention to address the above impairments. Continue treatment per established plan of care. to address goals. Recommendation for discharge: (in order for the patient to meet his/her long term goals) Physical therapy at least 2 days/week in the home and to be determined This discharge recommendation: A follow-up discussion with the attending provider and/or case management is planned IF patient discharges home will need the following DME: patient owns DME required for discharge SUBJECTIVE:  
Patient stated It's not going to fit, regarding getting her leg into the prosthesis OBJECTIVE DATA SUMMARY:  
Chart checked, pt cleared by nursing Critical Behavior: 
Neurologic State: Alert, Appropriate for age Orientation Level: Oriented X4 Functional Mobility Training: 
Bed Mobility: 
  
Supine to Sit: Modified independent;Bed Modified Transfers: 
  
  
     
  
     
  
  
  
  
Balance: 
Sitting: Intact Ambulation/Gait Training: 
  
  
  
  
  
  
  
  
  
  
  
  
  
  
  
  
  
  
 
Stairs: Therapeutic Exercises:  
 
 
Pain Rating: 
 
None rated Activity Tolerance:  
Fair secondary to nausea After treatment patient left in no apparent distress:  
Call bell within reach, Side rails x 3, and in long sit with one leg on the floor, per pt request 
 
COMMUNICATION/COLLABORATION:  
The patients plan of care was discussed with: Registered nurseLeanne Fontana Asp Time Calculation: 23 mins

## 2020-12-01 NOTE — PROGRESS NOTES
Pharmacist Note  Warfarin Dosing Consult provided for this 63 y. o.female to manage warfarin for Atrial Fibrillation INR Goal: 2 - 3 Home regimen/ tablet size: 2 mg M-W-F; 1 mg Tu/Th/Sat/ Sun 
 
Drugs that may increase INR: levofloxacin Drugs that may decrease INR: None Other current anticoagulants/ drugs that may increase bleeding risk: None Risk factors: None Daily INR ordered: YES Recent Labs 12/01/20 
0329 11/30/20 
0345 11/29/20 
0310 HGB  --   --  8.4* INR 1.8* 1.6* 1.5* Date               INR                  Dose 
11/23               1.9                   Held 
11/24                --                     Held 
11/25                --                     2 mg  
11/26              1.5                    1 mg  
11/27              1.3                    2 mg 
11/28              1.4                    2 mg 
11/29              1.5                    2.5 mg 
11/30              1.6                    2.5 mg 
12/1                1.8                    2 mg Assessment/ Plan: 
INR remains subtherapeutic after held for surgery. Will order warfarin 2 mg PO x 1 dose. Plan to resume home dose when INR therapeutic. Pharmacy will continue to monitor daily and adjust therapy as indicated. Please contact the pharmacist at  for outpatient recommendations if needed.

## 2020-12-01 NOTE — PROGRESS NOTES
Problem: Risk for Spread of Infection Goal: Prevent transmission of infectious organism to others Description: Prevent the transmission of infectious organisms to other patients, staff members, and visitors. Outcome: Progressing Towards Goal 
  
Problem: Patient Education:  Go to Education Activity Goal: Patient/Family Education Outcome: Progressing Towards Goal 
  
Problem: Falls - Risk of 
Goal: *Absence of Falls Description: Document Edmonia Morning Fall Risk and appropriate interventions in the flowsheet. Outcome: Progressing Towards Goal 
Note: Fall Risk Interventions: 
Mobility Interventions: Patient to call before getting OOB, Bed/chair exit alarm Medication Interventions: Bed/chair exit alarm, Evaluate medications/consider consulting pharmacy, Patient to call before getting OOB Elimination Interventions: Bed/chair exit alarm, Call light in reach, Patient to call for help with toileting needs

## 2020-12-01 NOTE — PROGRESS NOTES
Bedside shift change report given to Patria Taylor RN by [facility] RN. Report included the following information SBAR, Kardex, Intake/Output and MAR.

## 2020-12-01 NOTE — PROGRESS NOTES
12/1/2020 -  
TEE: 
- RUR: 32% 
- Disposition is TBD dependent on progression - potential discharge to own home with transport via spouse - Patient has continued with persistent nausea - Patient had difficulty swallowing pills yesterday, 11/30 
- Patient declined therapies yesterday, 11/30 
- ABX continue CRM: Manuel Celis, MPH, [address] [place] [address]; Z: [phone]

## 2020-12-01 NOTE — PROGRESS NOTES
[facility] Adult  Hospitalist Group Hospitalist Progress Note Kaylin Fournier MD 
Answering service: [phone] OR [phone] from in house phone Progress Note Patient: Odette Abrams MRN: [id number]  SSN: [id number] YOB: 1960  Age: 63 y.o. Sex: female Admit Date: 11/23/2020 LOS: 8 days Subjective:  
 
Patient presents with chest pain, s/p exploration of the right clavicular area 11/25 for concern of fluid collection, but no infection or bleeding was found. Patient also with fluid overload currently being diuresed. Still complaining of right-sided chest pain, but improving. On and off nausea. Objective:  
 
Vitals:  
 12/01/20 0011 12/01/20 0324 12/01/20 0850 12/01/20 1259 BP: (!) 159/80 (!) 162/94 (!) 165/85 (!) 149/81 Pulse: 90 91 90 90 Resp: 18 20 18 18 Temp: 97.9 °F (36.6 °C) 97.9 °F (36.6 °C) 97.9 °F (36.6 °C) 97.5 °F (36.4 °C) SpO2: 94% 97% 96% 98% Weight:      
Height:      
  
 
Intake and Output: 
Current Shift: 12/01 0701 - 12/01 1900 In: -  
Out: [facility] Last three shifts: 11/29 1901 - 12/01 0700 In: 390 [P.O.:390] Out: 1250 [[facility]:3153] Physical Exam:  
GENERAL: alert, cooperative, no distress, appears stated age THROAT & NECK: normal and no erythema or exudates noted. LUNG: clear to auscultation bilaterally BREAST: Patient has large hard mass on the right breast occupying bilateral upper quadrants HEART: regular rate and rhythm, S1, S2 normal, no murmur, click, rub or gallop ABDOMEN: soft, non-tender. Bowel sounds normal. No masses,  no organomegaly EXTREMITIES:  extremities normal, atraumatic, no cyanosis or edema SKIN: no rash or abnormalities NEUROLOGIC: AOx3. Gait normal. Reflexes and motor strength normal and symmetric. Cranial nerves 2-12 and sensation grossly intact.  
PSYCHIATRIC: non focal 
 
 Lab/Data Review: All lab results for the last 24 hours reviewed. Recent Results (from the past 24 hour(s)) PROTHROMBIN TIME + INR Collection Time: 12/01/20  3:29 AM  
Result Value Ref Range INR 1.8 (H) 0.9 - 1.1 Prothrombin time 18.2 (H) 9.0 - 11.1 sec Imaging: No results found. Assessment and Plan:  
 
Chest pain 
-Atypical chest pain, likely musculoskeletal related to recent surgery in the right clavicular area for osteomyelitis 
-Underwent exploration of right anterior chest wall wound bed 11/25 per thoracic surgery 
-No finding of abscess, infection or hematoma 
-Echo with normal EF 
-On colchicine for initially suspected pericarditis, chest pain is improving 
-Will discontinue colchicine for now could be causing her GI symptoms Anasarca 
-Being diuresed with Bumex 1 mg twice daily Nausea 
-Now persistent nausea 
-GI consult for further evaluation Urinary tract infection 
-Urine culture grew Enterobacter 
-Continue Levaquin Elevated troponin 
-No clinical signs and symptoms of acute coronary syndrome 
-Echocardiogram pending 
-Cardiology evaluating Hypertension 
-Continue hydralazine and metoprolol 
-Blood pressure stable Atrial fibrillation 
-Paroxysmal atrial fibrillation, s/p AV node ablation 
-Prior history of pacemaker pocket infection with subsequent removal, and replacement with leadless pacemaker 
-On Coumadin for anticoagulation, dosing per pharmacy Anemia 
-Normocytic anemia 
-H&H stable, continuemonitor Chronic kidney disease stage IV 
-Renal function stable, continue monitor Anticipated disposition is 24 to 48 hours pending progress. Signed By: Evie Roca MD   
 December 1, 2020

## 2020-12-01 NOTE — PROGRESS NOTES
Bedside and Verbal shift change report given to Cecil Torres RN (oncoming nurse) by Alicia Hawley RN (offgoing nurse). Report included the following information SBAR, Kardex, OR Summary, Procedure Summary, Intake/Output, MAR, Recent Results and Cardiac Rhythm paced.

## 2020-12-02 NOTE — PROGRESS NOTES
Spiritual Care Assessment/Progress Note  [facility]      NAME: Anatoliy Riggins      MRN: [id number]  AGE: 63 y.o. SEX: female  Christianity Affiliation: Gnosticism   Language: English     12/2/2020     Total Time (in minutes): 10     Spiritual Assessment begun in [facility] [place] [facility] TELEMETRY through conversation with:         [x]Patient        [] Family    [] Friend(s)        Reason for Consult: Initial/Spiritual assessment, patient floor     Spiritual beliefs: (Please include comment if needed)     [] Identifies with a adriane tradition:         [] Supported by a adriane community:            [] Claims no spiritual orientation:           [] Seeking spiritual identity:                [] Adheres to an individual form of spirituality:           [x] Not able to assess:                           Identified resources for coping:      [] Prayer                               [] Music                  [] Guided Imagery     [x] Family/friends                 [] Pet visits     [] Devotional reading                         [] Unknown     [] Other:                                               Interventions offered during this visit: (See comments for more details)    Patient Interventions: Affirmation of emotions/emotional suffering, Normalization of emotional/spiritual concerns           Plan of Care:     [x] Support spiritual and/or cultural needs    [] Support AMD and/or advance care planning process      [] Support grieving process   [] Coordinate Rites and/or Rituals    [] Coordination with community clergy   [] No spiritual needs identified at this time   [] Detailed Plan of Care below (See Comments)  [] Make referral to Music Therapy  [] Make referral to Pet Therapy     [] Make referral to Addiction services  [] Make referral to [facility]  [] Make referral to Spiritual Care Partner  [] No future visits requested        [x] Follow up upon further referrals     Comments:  for initial visit.  Pt was in bed resting and pt's RN was present at bedside.  was able to talk to pt and let her know of  availability.  provided pastoral listening. Please contact [phone] [facility] for further support.      [address] Nolan Edmond, MACE   [phone])

## 2020-12-02 NOTE — PROGRESS NOTES
Pharmacist Note  Warfarin Dosing Consult provided for this 63 y. o.female to manage warfarin for Atrial Fibrillation INR Goal: 2 - 3 Home regimen/ tablet size: 2 mg M-W-F; 1 mg Tu/Th/Sat/ Sun 
 
Drugs that may increase INR: levofloxacin Drugs that may decrease INR: None Other current anticoagulants/ drugs that may increase bleeding risk: None Risk factors: None Daily INR ordered: YES Recent Labs 12/02/20 
0291 12/01/20 
0329 11/30/20 
0345 HGB 8.8*  --   --   
INR 2.0* 1.8* 1.6* Date               INR                  Dose 
11/23               1.9                   Held 
11/24                --                     Held 
11/25                --                     2 mg  
11/26              1.5                    1 mg  
11/27              1.3                    2 mg 
11/28              1.4                    2 mg 
11/29              1.5                    2.5 mg 
11/30              1.6                    2.5 mg 
12/1                1.8                    2 mg 
12/2                 2                      2 mg Assessment/ Plan: Will order warfarin 2 mg PO x 1 dose, resuming home regimen Pharmacy will continue to monitor daily and adjust therapy as indicated. Please contact the pharmacist at  for outpatient recommendations if needed.

## 2020-12-02 NOTE — PROGRESS NOTES
Patient returned from radiology, complaints of nausea. Zofran given, patient refused other medications saying \"I do not want to take anything right now. \" Explained the importance of taking medications to help with lowing her blood pressure. She stated she still did not want to take them this morning. Notified Dr. Darya Jacobo.

## 2020-12-02 NOTE — PROGRESS NOTES
[facility] [facility] Adult  Hospitalist Group                                                                                          Hospitalist Progress Note  Ruth Ann Champion MD  Answering service: [phone] OR [phone] from in house phone              Progress Note    Patient: Juliocesar Juarez MRN: [id number]  SSN: [id number]    YOB: 1960  Age: 63 y.o. Sex: female      Admit Date: 11/23/2020    LOS: 9 days     Subjective:     Patient presents with chest pain, s/p exploration of the right clavicular area 11/25 for concern of fluid collection, but no infection or bleeding was found. Patient also with fluid overload currently being diuresed. Still complaining of right-sided chest pain, but improving. On and off nausea. Objective:     Vitals:    12/02/20 0344 12/02/20 0402 12/02/20 0849 12/02/20 1154   BP: (!) 165/78  (!) 157/81 (!) 165/84   Pulse: 90  90 90   Resp: 18  18 16   Temp: 98 °F (36.7 °C)  97.8 °F (36.6 °C) 97.8 °F (36.6 °C)   SpO2: 96%  97% 98%   Weight:  111 kg (244 lb 12.8 oz)     Height:            Intake and Output:  Current Shift: 12/02 0701 - 12/02 1900  In: 0   Out: 350 [Urine:350]  Last three shifts: 11/30 1901 - 12/02 0700  In: -   Out: 1700 [Urine:1700]    Physical Exam:   GENERAL: alert, cooperative, no distress, appears stated age  THROAT & NECK: normal and no erythema or exudates noted. LUNG: clear to auscultation bilaterally  BREAST: Patient has large hard mass on the right breast occupying bilateral upper quadrants  HEART: regular rate and rhythm, S1, S2 normal, no murmur, click, rub or gallop  ABDOMEN: soft, non-tender. Bowel sounds normal. No masses,  no organomegaly  EXTREMITIES:  extremities normal, atraumatic, no cyanosis or edema  SKIN: no rash or abnormalities  NEUROLOGIC: AOx3. Gait normal. Reflexes and motor strength normal and symmetric. Cranial nerves 2-12 and sensation grossly intact. PSYCHIATRIC: non focal    Lab/Data Review:   All lab results for the last 24 hours reviewed. Recent Results (from the past 24 hour(s))   PROTHROMBIN TIME + INR    Collection Time: 12/02/20  3:57 AM   Result Value Ref Range    INR 2.0 (H) 0.9 - 1.1      Prothrombin time 19.8 (H) 9.0 - 71.0 sec   METABOLIC PANEL, BASIC    Collection Time: 12/02/20  3:57 AM   Result Value Ref Range    Sodium 138 136 - 145 mmol/L    Potassium 2.9 (L) 3.5 - 5.1 mmol/L    Chloride 102 97 - 108 mmol/L    CO2 28 21 - 32 mmol/L    Anion gap 8 5 - 15 mmol/L    Glucose 127 (H) 65 - 100 mg/dL    BUN 18 6 - 20 MG/DL    Creatinine 1.75 (H) 0.55 - 1.02 MG/DL    BUN/Creatinine ratio 10 (L) 12 - 20      GFR est AA 36 (L) >60 ml/min/1.73m2    GFR est non-AA 30 (L) >60 ml/min/1.73m2    Calcium 8.7 8.5 - 10.1 MG/DL   CBC WITH AUTOMATED DIFF    Collection Time: 12/02/20  3:57 AM   Result Value Ref Range    WBC 6.8 3.6 - 11.0 K/uL    RBC 3.78 (L) 3.80 - 5.20 M/uL    HGB 8.8 (L) 11.5 - 16.0 g/dL    HCT 29.5 (L) 35.0 - 47.0 %    MCV 78.0 (L) 80.0 - 99.0 FL    MCH 23.3 (L) 26.0 - 34.0 PG    MCHC 29.8 (L) 30.0 - 36.5 g/dL    RDW 17.7 (H) 11.5 - 14.5 %    PLATELET 509 570 - 937 K/uL    MPV 10.7 8.9 - 12.9 FL    NRBC 0.0 0  WBC    ABSOLUTE NRBC 0.00 0.00 - 0.01 K/uL    NEUTROPHILS 68 32 - 75 %    LYMPHOCYTES 14 12 - 49 %    MONOCYTES 14 (H) 5 - 13 %    EOSINOPHILS 2 0 - 7 %    BASOPHILS 1 0 - 1 %    IMMATURE GRANULOCYTES 1 (H) 0.0 - 0.5 %    ABS. NEUTROPHILS 4.7 1.8 - 8.0 K/UL    ABS. LYMPHOCYTES 0.9 0.8 - 3.5 K/UL    ABS. MONOCYTES 1.0 0.0 - 1.0 K/UL    ABS. EOSINOPHILS 0.1 0.0 - 0.4 K/UL    ABS. BASOPHILS 0.1 0.0 - 0.1 K/UL    ABS. IMM. GRANS. 0.0 0.00 - 0.04 K/UL    DF AUTOMATED          Imaging:    No results found.        Assessment and Plan:     Chest pain  -Atypical chest pain, likely musculoskeletal related to recent surgery in the right clavicular area for osteomyelitis  -Underwent exploration of right anterior chest wall wound bed 11/25 per thoracic surgery  -No finding of abscess, infection or hematoma  -Echo with normal EF  -On colchicine for initially suspected pericarditis, chest pain is improving  -Will discontinue colchicine for now could be causing her GI symptoms    Anasarca  -Being diuresed with Bumex 1 mg twice daily    Nausea  -Now persistent nausea  -GI evaluating, upper GI series today    Urinary tract infection  -Urine culture grew Enterobacter  -Last dose of Levaquin today    Elevated troponin  -No clinical signs and symptoms of acute coronary syndrome  -Echocardiogram pending  -Cardiology evaluating    Hypertension  -Continue hydralazine and metoprolol  -Blood pressure stable     Atrial fibrillation  -Paroxysmal atrial fibrillation, s/p AV node ablation  -Prior history of pacemaker pocket infection with subsequent removal, and replacement with leadless pacemaker  -On Coumadin for anticoagulation, dosing per pharmacy    Anemia  -Normocytic anemia  -H&H stable, continue monitor    Chronic kidney disease stage IV  -Renal function stable, continue monitor    Anticipated disposition is 24 to 48 hours pending progress.     Signed By: Juan Escobar MD     December 2, 2020

## 2020-12-02 NOTE — PROGRESS NOTES
Problem: Mobility Impaired (Adult and Pediatric)  Goal: *Acute Goals and Plan of Care (Insert Text)  Description: FUNCTIONAL STATUS PRIOR TO ADMISSION: Patient was modified independent using a rolling walker and left BKA prosthetic for functional mobility. HOME SUPPORT PRIOR TO ADMISSION: The patient lived with her  but generally but did not require assist.    Physical Therapy Goals  Initiated 11/27/2020  1. Patient will move from supine to sit and sit to supine  in bed with modified independence within 7 day(s). 2.  Patient will transfer from bed to chair and chair to bed with modified independence using the least restrictive device within 7 day(s). 3.  Patient will perform sit to stand with modified independence within 7 day(s). 4.  Patient will ambulate with modified independence for 50 feet with the least restrictive device within 7 day(s). Outcome: Progressing Towards Goal    PHYSICAL THERAPY TREATMENT  Patient: Debbi Manjarrez (58 y.o. female)  Date: 12/2/2020  Diagnosis: Chest pain [R07.9]   <principal problem not specified>  Procedure(s) (LRB):  EXPLORATION OF RIGHT CHEST PHLEGMON (Right) 7 Days Post-Op  Precautions:    Chart, physical therapy assessment, plan of care and goals were reviewed. ASSESSMENT  Patient continues with skilled PT services and is progressing towards goals. Pt with improved nausea/vomiting today as compared to previous days and reported willingness to work with therapy, though requested to return to bed after ambulating. She reports that earlier in the day she ambulated to and from the transport stretcher without incident but voiced concerns re: perceived weakness. Educated on importance of continued activity in the acute setting to prevent loss of strength and function that she worked hard to gain while in [employer]. Pt performed all aspects of bed mobility at Mod I level and donned/doffed L prosthesis independently.  She then transferred to standing with Min A with RW ahead and ambulated 13ft total. She reported excessive fatigue, that she related to activity earlier in day, and requested to only perform short ambulation trial. She returned to supine independently. Acute PT will continue to follow pt while she remains in the acute setting. Anticipate that she will continue to progress with tolerance and independence as acute medical symptoms continue to improve. Rec Home with HHPT at [place]. Current Level of Function Impacting Discharge (mobility/balance): Min A for sit<>stand transfers, CGA for gait with prosthesis and RW    Other factors to consider for discharge: None         PLAN :  Patient continues to benefit from skilled intervention to address the above impairments. Continue treatment per established plan of care. to address goals. Recommendation for discharge: (in order for the patient to meet his/her long term goals)  Physical therapy at least 2 days/week in the home     This discharge recommendation:  Has been made in collaboration with the attending provider and/or case management    IF patient discharges home will need the following DME: patient owns DME required for discharge       SUBJECTIVE:   Patient stated I definitely don't want to go backwards.     OBJECTIVE DATA SUMMARY:   Critical Behavior:  Neurologic State: Alert  Orientation Level: Oriented X4        Functional Mobility Training:  Bed Mobility:  Rolling: Modified independent  Supine to Sit: Modified independent  Sit to Supine: Modified independent  Scooting: Modified independent        Transfers:  Sit to Stand: Minimum assistance  Stand to Sit: Contact guard assistance                             Balance:  Sitting: Intact  Standing: Impaired  Standing - Static: Constant support;Good  Standing - Dynamic : Good;Constant support  Ambulation/Gait Training:  Distance (ft): 13 Feet (ft)  Assistive Device: Walker, rolling;Gait belt(prosthesis)  Ambulation - Level of Assistance: Contact guard assistance     Gait Description (WDL): Exceptions to [facility] [place]           Base of Support: Shift to right  Stance: Left decreased  Speed/Kat: Slow;Pace decreased (<100 feet/min); Shuffled                  Pain Rating:  Denied pain    Activity Tolerance:   Fair and requires rest breaks    After treatment patient left in no apparent distress:   Supine in bed and Call bell within reach    COMMUNICATION/COLLABORATION:   The patients plan of care was discussed with: Registered nurse.      John Hutchinson PT, DPT   Time Calculation: 30 mins

## 2020-12-02 NOTE — PROGRESS NOTES
Problem: Risk for Spread of Infection Goal: Prevent transmission of infectious organism to others Description: Prevent the transmission of infectious organisms to other patients, staff members, and visitors. Outcome: Progressing Towards Goal 
Note: Continue contact precautions Problem: Pressure Injury - Risk of 
Goal: *Prevention of pressure injury Description: Document Valdemar Scale and appropriate interventions in the flowsheet. Outcome: Progressing Towards Goal 
Note: Pressure Injury Interventions: 
Sensory Interventions: Assess need for specialty bed Moisture Interventions: Absorbent underpads, Internal/External urinary devices Activity Interventions: Assess need for specialty bed, PT/OT evaluation Mobility Interventions: Assess need for specialty bed, Pressure redistribution bed/mattress (bed type), PT/OT evaluation Nutrition Interventions: Document food/fluid/supplement intake Friction and Shear Interventions: Apply protective barrier, creams and emollients, HOB 30 degrees or less

## 2020-12-02 NOTE — CONSULTS
[address] 
611 [facility], [address] [place] [address] GASTROENTEROLOGY CONSULTATION NOTE 
   
 
NAME:  Radha Cohn :   1960 MRN:   [id number] Consult Date: 2020 11:51 PM 
 
 
History of Present Illness:  Patient is a 63 y.o. who is seen in consultation at the request of Dr. Abe Hercules for nausea. She has a PMH as below. She has a history of remote gastric bypass. She reports having nausea for about four weeks prior to her recent pacemaker implantation. She denies having complications of gastric bypass in the past. She denies recent NSAID use. She reports having diarrhea, which she attributes to taking colchicine. PMH: 
Past Medical History:  
Diagnosis Date  Acquired lymphedema Right arm  Arrhythmia  Asthma  Atrial fibrillation ([address] Dr. Golden Sanchez and Dr. Joenathan Schilder [facility])  Cancer ([address] bilateral breast  
 Chronic kidney disease  Diabetes mellitus type II   
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 
Past Surgical History:  
Procedure Laterality Date  ABDOMEN SURGERY PROC UNLISTED    
 gastric bypass  GASTRIC BYPASS,OBESE<150CM SAW-EN-Y    
 hutcher  HX AFIB ABLATION    
 HX AMPUTATION FOOT Left 2020  HX BREAST RECONSTRUCTION Bilateral   
 HX CARPAL TUNNEL RELEASE [id number] RIGHT MODIFIED RADICAL  HX MASTECTOMY Left   
 no lymphnode removal  
 HX MOHS PROCEDURES  1995  
 right  HX ORTHOPAEDIC Right   
 knee surgery  HX PACEMAKER  11/17/2020 Dr. Fouzia Glover. Insertion of permanent pacemaker. AV node ablation  HX PACEMAKER    
 IR INSERT TUNL CVC W PORT OVER 5 YEARS    
 IR INSERT TUNL CVC W/O PORT OVER 5 YR  5/4/2020  IR INSERT TUNL CVC W/O PORT OVER 5 YR  9/8/2020  IR REMOVE TUNL CVAD W/O PORT / PUMP  6/26/2020  IR REMOVE TUNL CVAD W/O PORT / PUMP  10/19/2020  [address]  8.21.07  
 LA [address] 9[address] AND 1994  LA ICAR CATHETER ABLATION ATRIOVENTR NODE FUNCTION N/A 11/17/2020 ABLATION AV NODE performed by Ja Fraser MD at [address] CATH LAB  LA RELOCATION OF SKIN POCKET FOR PACEMAKER N/A 4/24/2020 RELOCATE 2 [facility] performed by Americo Marr MD at [address] CATH LAB  LA RMVL TRANSVNS PM ELTRD 1 LEAD SYS ATR/VENTR N/A 4/24/2020 Remove Pacemaker Dual Leads performed by Americo Marr MD at [address] CARDIAC CATH LAB  LA RMVL TRANSVNS PM ELTRD 1 LEAD SYS ATR/VENTR N/A 4/27/2020 REMOVE PACEMAKER DUAL LEADS performed by Americo Marr MD at [address] CATH LAB  LA TCAT INSJ/RPL PERM LEADLESS PACEMAKER RV W/IMG N/A 11/17/2020 INSERT OR REPLACE TRANSCATH PPM LEADLESS performed by Ja Fraser MD at [address] CATH LAB  LA TRABECULOPLASTY BY LASER SURGERY    
 US GUIDE ASP OVARIAN CYST ABD APPROACH  8.21.07  
 RIGHT Allergies: Allergies Allergen Reactions  Avapro [Irbesartan] Unable to Obtain  Bactrim [Sulfamethoxazole-Trimethoprim] Other (comments) Sore mouth  Contrast Agent [Iodine] Itching [address]  Morphine Nausea and Vomiting and Swelling  Penicillins Hives Did not tolerate cefepime 3/2020  Pravachol [Pravastatin] Nausea Only  Seafood [Shellfish Containing Products] Hives  Singulair [Montelukast] Other (comments)  
  palpitations  Ace Inhibitors Cough  Amlodipine Swelling  Captopril Cough  Carvedilol Diarrhea Home Medications: 
Prior to Admission Medications Prescriptions Last Dose Informant Patient Reported? Taking? bumetanide (BUMEX) 1 mg tablet   Yes Yes Sig: Take 0.5 mg by mouth two (2) times a day. busPIRone (BUSPAR) 10 mg tablet  Self No Yes Sig: TAKE ONE TABLET BY MOUTH TWICE A DAY  
hydrALAZINE (APRESOLINE) 100 mg tablet   No Yes Sig: Take 1 Tab by mouth three (3) times daily. insulin glargine (Lantus Solostar U-100 Insulin) 100 unit/mL (3 mL) inpn   Yes Yes Sig: 10 Units by SubCUTAneous route Daily (before breakfast). metoprolol tartrate (LOPRESSOR) 25 mg tablet 2020 at 1700  No Yes Sig: Take 1 Tab by mouth two (2) times a day. ondansetron (Zofran ODT) 4 mg disintegrating tablet   No Yes Sig: Take 1 Tab by mouth every eight (8) hours as needed for Nausea. sertraline (ZOLOFT) 100 mg tablet   Yes Yes  
sertraline (ZOLOFT) 25 mg tablet   Yes Yes Sig: Take 25 mg by mouth daily. Take with 100 mg tablet every morning  
warfarin (Coumadin) 1 mg tablet   Yes Yes Si mg M-W-FR and 1 all other days Facility-Administered Medications: None Hospital Medications: 
Current Facility-Administered Medications Medication Dose Route Frequency  levoFLOXacin (LEVAQUIN) tablet 750 mg  750 mg Oral Q48H  colchicine tablet 0.6 mg  0.6 mg Oral DAILY  ondansetron (ZOFRAN) injection 4 mg  4 mg IntraVENous Q4H PRN  
 HYDROmorphone (PF) (DILAUDID) injection 1 mg  1 mg IntraVENous Q3H PRN  
 Warfarin - pharmacy to dose   Other Rx Dosing/Monitoring  bumetanide (BUMEX) injection 1 mg  1 mg IntraVENous BID  hydrALAZINE (APRESOLINE) tablet 100 mg  100 mg Oral TID  metoprolol tartrate (LOPRESSOR) tablet 25 mg  25 mg Oral BID  
 oxyCODONE-acetaminophen (PERCOCET) 5-325 mg per tablet 2 Tab  2 Tab Oral Q6H PRN  
 
 
 Social History: 
Social History Tobacco Use  Smoking status: Never Smoker  Smokeless tobacco: Never Used Substance Use Topics  Alcohol use: Not Currently Family History: 
Family History Problem Relation Age of Onset  Cancer Mother  Heart Disease Mother  Alcohol abuse Father  Diabetes Brother  Hypertension Brother Review of Systems: 
Constitutional: negative fever, negative chills, negative weight loss Eyes:   negative visual changes ENT:   negative sore throat, tongue or lip swelling Respiratory:  negative cough, negative dyspnea Cards:  negative for chest pain, palpitations, lower extremity edema GI:   See HPI 
:  negative for frequency, dysuria Integument:  negative for rash and pruritus Heme:  negative for easy bruising and gum/nose bleeding Musculoskel: negative for myalgias,  back pain and muscle weakness Neuro: negative for headaches, dizziness, vertigo Psych:  negative for feelings of anxiety, depression Objective:  
 
Patient Vitals for the past 8 hrs: 
 BP Temp Pulse Resp SpO2  
20 2325 (!) 142/76 98.2 °F (36.8 °C) 90 16 95 % 20 129/66 98.4 °F (36.9 °C) 90 18 99 % 20 1607 (!) 161/83 97.8 °F (36.6 °C) 90 18 98 % 1901 -  0700 In: -  
Out: 750 [Urine:750] 701 -  190 In: 150 [P.O.:150] Out: 1350 [ZNJF] PHYSICAL EXAM: 
General: WD, WN. Alert, cooperative, no acute distress   
HEENT: NC, Atraumatic. PERRLA, EOMI. Anicteric sclerae. Lungs:  CTA Bilaterally. No Wheezing/Rhonchi/Rales. Heart:  Regular  rhythm,  No murmur (), No Rubs, No Gallops Abdomen: Soft, Non distended, Non tender.  +Bowel sounds, no HSM Extremities: No c/c/e Neurologic:  CN 2-12 gi, Alert and oriented X 3. No acute neurological distress Psych:   Good insight. Not anxious nor agitated. Data Review Recent Labs  
  20 
0310 WBC 6.6 HGB 8.4* HCT 28.2*  
 Recent Labs 11/30/20 
0345 11/29/20 
0310 * 136  
K 3.4* 3.6  104 CO2 24 22 BUN 18 19 CREA 1.70* 1.80* * 159* CA 8.4* 8.7 No results for input(s): AP, TBIL, TP, ALB, GLOB, GGT, AML, LPSE in the last 72 hours. No lab exists for component: SGOT, GPT, AMYP, HLPSE Recent Labs 12/01/20 
0329 11/30/20 
0345 INR 1.8* 1.6* PTP 18.2* 15.9* Assessment:  
· Nausea · History of gastric bypass · Chest pain · CKD · Anemia · Afib on warfarin anticoagulation Plan:  
· Supportive measures · UGI series · Next, consider EGD Signed By: Yris Tucker.  Xiomy Singleton MD   
 12/1/2020  11:51 PM

## 2020-12-02 NOTE — PROGRESS NOTES
Comprehensive Nutrition Assessment      Comprehensive Nutrition Assessment    Type and Reason for Visit: Initial, RD nutrition re-screen/LOS    Nutrition Recommendations/Plan:   1. Restart Consistent Carb diet when able  2. Monitor need for ONS- Ensure HP appropriate. 3. Replete K+- trending down  4. Monitor BG, weight, edema. Nutrition Assessment:       Pt admitted for Chest pain [R07.9]. Pt  has a past medical history of Gastric Bypass (2008), HTN, DM2, Atrial fibrillation/flutter, CKD3. 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 Pt screened for LOS. Pt familiar to service from multiple previous admissions. Previous education regarding protein sources and importance for wound healing. Does not like Glucerna but agreeable to trial of Magic Cup and Yogurt as snacks. Food options th biggest barrier to PO along with inability to eat much at one sitting with hx of GBS. Avoid high levels of concentrated sweets but generally does not have major issues with dumping. Had been compliant with vitamins at home but does not want to received this admit. Per previous interview pt reports wt fluctuations of 30# due to fluid status. Wt loss of 37lb (14% BW) over past 8 months with poor PO intake and limited activity with hospitalizations. Hard to discern true weights since BKA and fluid fluctuations. Pt usual body weight appears to be closer to 240 lbs when dry. Pt appears to lose ~20 lbs when retaining fluid. Currently not eating well. Pt with n/v. Pt NPO for GI eval. Pt had UGI eval done this morning. Results pending.       Wt Readings from Last 30 Encounters:   12/02/20 111 kg (244 lb 12.8 oz)   11/23/20 118.8 kg (262 lb)   11/18/20 112 kg (246 lb 14.6 oz)   11/05/20 121.6 kg (268 lb 1.3 oz)   11/05/20 121.6 kg (268 lb)   10/28/20 116.1 kg (256 lb)   10/27/20 113.9 kg (251 lb)   10/13/20 109.9 kg (242 lb 3.2 oz)   10/07/20 101.6 kg (224 lb)   09/26/20 101.6 kg (224 lb)   09/09/20 101.5 kg (223 lb 12.3 oz)   08/19/20 109.8 kg (242 lb)   05/06/20 110.2 kg (243 lb)   03/26/20 113.9 kg (251 lb)   03/19/20 108.2 kg (238 lb 8.6 oz)   01/24/20 111.6 kg (246 lb)   01/14/20 113.5 kg (250 lb 3.6 oz)   12/12/19 119.3 kg (263 lb)   12/06/19 113.9 kg (251 lb 1.7 oz)   11/13/19 115.4 kg (254 lb 8 oz)   09/27/19 115.2 kg (254 lb)   09/03/19 118.2 kg (260 lb 9.3 oz)   08/15/19 117.8 kg (259 lb 9.6 oz)   08/09/19 118 kg (260 lb 2.3 oz)   07/10/19 117.9 kg (260 lb)   06/06/19 118.8 kg (262 lb)   05/30/19 118.2 kg (260 lb 8 oz)   04/08/19 118.8 kg (261 lb 12.8 oz)   03/11/19 122.2 kg (269 lb 8 oz)   11/12/18 126.6 kg (279 lb)          Patient Vitals for the past 168 hrs:   % Diet Eaten   12/02/20 0849 0 %   12/01/20 1644 0 %   11/30/20 1330 10 %   11/29/20 1707 50 %   11/27/20 1214 0 %   11/27/20 0904 10 %   11/26/20 1843 20 %       Malnutrition Assessment:  Malnutrition Status: At risk for malnutrition (specify)    Context:          Estimated Daily Nutrient Needs:  Energy (kcal):  3494-4939  Protein (g):  125 -1.1g/kg       Fluid (ml/day):  2100    Nutrition Related Findings:  Last BM 12/1, edema Trace BUE, Generalized and RLE      Wounds:    Surgical incision     Current Nutrition Therapies:  DIET ONE TIME MESSAGE  DIET ONE TIME MESSAGE  DIET ONE TIME MESSAGE  DIET NPO    Anthropometric Measures:  · Height:  5' 10\" (177.8 cm)  · Current Body Wt:  110.7 kg (244 lb)   · Admission Body Wt:       · Usual Body Wt:  117.9 kg (260 lb)     · Ideal Body Wt:   :      · Adjusted Body Weight:  258.4; Weight Adjustment for: Amputation   · Adjusted BMI:  37.1    · BMI Category:  Obese class 2 (BMI 35.0-39. 9)       Nutrition Diagnosis:   · Inadequate energy intake related to inadequate protein-energy intake as evidenced by nausea, intake 0-25%      Nutrition Interventions:   Food and/or Nutrient Delivery: Start oral diet, Start oral nutrition supplement  Nutrition Education and Counseling: No recommendations at this time  Coordination of Nutrition Care: Continue to monitor while inpatient, Interdisciplinary rounds    Goals:  Pt will consume >50% of meals without nausea within 5-7 days. Nutrition Monitoring and Evaluation:   Behavioral-Environmental Outcomes: Beliefs and attitudes, Readiness for change  Food/Nutrient Intake Outcomes: Food and nutrient intake, Supplement intake, Diet advancement/tolerance  Physical Signs/Symptoms Outcomes: Weight, Biochemical data    Discharge Planning:     Too soon to determine     Electronically signed by Gumaro Collier, [address] [place] [address] on 12/2/2020    Contact: [phone]

## 2020-12-02 NOTE — PROGRESS NOTES
[address] [place] [address] [facility], [address] [place] [address] Robert       GI PROGRESS NOTE  Will Keisha Marc  [phone] office  [phone] NP/PA in-hospital cell phone M-F until 4:30PM  After 5PM or on weekends, please call  for physician on call      NAME: Bhanu Freeman   :  1960   MRN:  [id number]       Subjective:   Patient is drowsy. She reports improvement in nausea. No vomiting or abdominal pain. UGI series was done this morning. Objective:     VITALS:   Last 24hrs VS reviewed since prior progress note. Most recent are:  Visit Vitals  BP (!) 165/84 (BP 1 Location: Left arm, BP Patient Position: At rest;Supine)   Pulse 90   Temp 97.8 °F (36.6 °C)   Resp 16   Ht 5' 10\" (1.778 m)   Wt 111 kg (244 lb 12.8 oz)   SpO2 98%   BMI 35.13 kg/m²       PHYSICAL EXAM:  General: Cooperative, no acute distress    Neurologic:  Alert, very drowsy  HEENT: EOMI, no scleral icterus   Lungs:  No respiratory distress  Abdomen: Soft, non-distended, no tenderness, no guarding, no rebound.   Extremities: Warm  Psych:   Not anxious or agitated    Lab Data Reviewed:     Recent Results (from the past 24 hour(s))   PROTHROMBIN TIME + INR    Collection Time: 20  3:57 AM   Result Value Ref Range    INR 2.0 (H) 0.9 - 1.1      Prothrombin time 19.8 (H) 9.0 - 76.1 sec   METABOLIC PANEL, BASIC    Collection Time: 20  3:57 AM   Result Value Ref Range    Sodium 138 136 - 145 mmol/L    Potassium 2.9 (L) 3.5 - 5.1 mmol/L    Chloride 102 97 - 108 mmol/L    CO2 28 21 - 32 mmol/L    Anion gap 8 5 - 15 mmol/L    Glucose 127 (H) 65 - 100 mg/dL    BUN 18 6 - 20 MG/DL    Creatinine 1.75 (H) 0.55 - 1.02 MG/DL    BUN/Creatinine ratio 10 (L) 12 - 20      GFR est AA 36 (L) >60 ml/min/1.73m2    GFR est non-AA 30 (L) >60 ml/min/1.73m2    Calcium 8.7 8.5 - 10.1 MG/DL   CBC WITH AUTOMATED DIFF    Collection Time: 20  3:57 AM   Result Value Ref Range    WBC 6.8 3.6 - 11.0 K/uL    RBC 3.78 (L) 3.80 - 5.20 M/uL    HGB 8.8 (L) 11.5 - 16.0 g/dL    HCT 29.5 (L) 35.0 - 47.0 %    MCV 78.0 (L) 80.0 - 99.0 FL    MCH 23.3 (L) 26.0 - 34.0 PG    MCHC 29.8 (L) 30.0 - 36.5 g/dL    RDW 17.7 (H) 11.5 - 14.5 %    PLATELET 957 215 - 480 K/uL    MPV 10.7 8.9 - 12.9 FL    NRBC 0.0 0  WBC    ABSOLUTE NRBC 0.00 0.00 - 0.01 K/uL    NEUTROPHILS 68 32 - 75 %    LYMPHOCYTES 14 12 - 49 %    MONOCYTES 14 (H) 5 - 13 %    EOSINOPHILS 2 0 - 7 %    BASOPHILS 1 0 - 1 %    IMMATURE GRANULOCYTES 1 (H) 0.0 - 0.5 %    ABS. NEUTROPHILS 4.7 1.8 - 8.0 K/UL    ABS. LYMPHOCYTES 0.9 0.8 - 3.5 K/UL    ABS. MONOCYTES 1.0 0.0 - 1.0 K/UL    ABS. EOSINOPHILS 0.1 0.0 - 0.4 K/UL    ABS. BASOPHILS 0.1 0.0 - 0.1 K/UL    ABS. IMM.  GRANS. 0.0 0.00 - 0.04 K/UL    DF AUTOMATED         Assessment:   · Nausea  · History of gastric bypass  · Chest pain  · Chronic kidney disease  · Anemia  · Atrial fibrillation: on Coumadin     Patient Active Problem List   Diagnosis Code    History of breast cancer Z85.3    Asthma J45.909    Fe deficiency anemia D50.9    Status post ablation of atrial fibrillation Z98.890, Z86.79    Sick sinus syndrome (HCC) I49.5    S/P cardiac pacemaker procedure Z95.0    Long term (current) use of anticoagulants Z79.01    Mixed hyperlipidemia E78.2    CKD (chronic kidney disease), stage IV ([facility]) B25.0    Systolic murmur K41.2    Essential hypertension I10    PAF (paroxysmal atrial fibrillation) ([facility]) I48.0    Anemia in stage 4 chronic kidney disease (HCC) N18.4, D63.1    Controlled type 2 diabetes mellitus with stage 4 chronic kidney disease, with long-term current use of insulin ([facility]) E11.22, N18.4, Z79.4    Morbid obesity with BMI of 40.0-44.9, adult ([facility] E66.01, Z68.41    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, associated with type 2 diabetes mellitus ([facility] R11.6229    Diabetic polyneuropathy associated with type 2 diabetes mellitus (HCC) E11.42    Venous stasis ulcer of right lower leg with edema of right lower leg (HCC) I83.019, I83.891, L97.919, R60.9    Diabetic ulcer of right midfoot associated with type 2 diabetes mellitus, with fat layer exposed ([address] E11.621, L97.412    Foot deformity, right M21.961    Pes cavus Q66.70    H/O bilateral mastectomy Z90.13    Left below-knee amputee ([address] Z89.512    S/P atrioventricular rolando ablation Z98.890    Chest pain R07.9     Plan:   · Supportive measures: antiemetics PRN  · Follow UGI series results  · Next consider EGD     Signed By: LORNA Delgado     12/2/2020  12:55 PM         GI Attending: UGI series shows marked delay in contrast emptying from gastric pouch through [address] anastomosis into Jose limb. She will need EGD with possible dilation next. She is currently therapeutic on warfarin. She will either need to have INR reversed for EGD or held until INR is closer to 1.0 prior to EGD since dilation will likely be needed. Will discuss with primary team tomorrow. Butch Greco MD

## 2020-12-02 NOTE — PROGRESS NOTES
IV infiltrated during the end of the final potassium bag, IV removed and heat applied. Attempted to place new line unsuccessfully. Asked another nurse who was also unsuccessful. Called critical transport, Wild Cho, who came up and attempted too unsuccessfully. Notified Dr. Sepideh Nelson of inability to place new IV line, order placed for midline.

## 2020-12-02 NOTE — PROGRESS NOTES
12/2/2020 -   TEE:  - RUR: 29%  - Potential disposition is for discharge to own home with transport via spouse    - Patient has refused meds today, 12/2, due to nausea  - Patient may need EGD  - ABX continue  CRM: Lalit Hwnag, MPH, [address] [place] [address]; Z: [phone]

## 2020-12-03 NOTE — PROGRESS NOTES
Order for midline noted at 19:00. Attempted PIV to lower portion of left arm without success. Right limb alert. VAT to review 12/3 in am for appropriate line and follow through. RN aware.

## 2020-12-03 NOTE — PROGRESS NOTES
12/3/2020 -   TEE:  - RUR: 29%  - Potential disposition is for discharge to own home with transport via spouse: patient may need [place]     - Midline placement pending  - ABX completed 12/2  - Patient may need EGD  CRM: Isaac Rowland, MPH, [address] [place] [address]; Z: [phone]

## 2020-12-03 NOTE — PROCEDURES
Midline Insertion and Progress Note    PRE-PROCEDURE VERIFICATION  Correct Procedure: yes  Correct Site:  yes  Temperature: Temp: 98.2 °F (36.8 °C), Temperature Source: Temp Source: Oral  Recent Labs     12/03/20  0418 12/02/20  0357   BUN 18 18   CREA 1.66* 1.75*   PLT  --  284   INR 1.9* 2.0*   WBC  --  6.8     Allergies: [place] Inks; Bactrim [sulfamethoxazole-trimethoprim]; Contrast agent [iodine]; Fish containing products; Morphine; Penicillins; Pravachol [pravastatin]; Seafood [shellfish containing products]; Singulair [montelukast]; Ace inhibitors; Amlodipine; Captopril; and Carvedilol    PROCEDURE DETAIL  A single lumen midline IV catheter was started for vascular access. The following documentation is in addition to the Midline properties in the lines/airways flowsheet :  Xylocaine 1% used intradermally  Lot #: [id number]  Catheter Total Length: 15 (cm)  External Catheter Length: 0 (cm)  Circumference: 32.5 (cm)  Vein Selection for Midline :left brachial  Complication Related to Insertion: oozing on the site. Line is okay to use.

## 2020-12-03 NOTE — PROGRESS NOTES
Bedside and Verbal shift change report given to Tanya Harry RN (oncoming nurse) by Jona Rivas RN (offgoing nurse). Report included the following information SBAR, Kardex, Procedure Summary, Intake/Output, MAR, Recent Results, Med Rec Status and Cardiac Rhythm Paced.

## 2020-12-03 NOTE — PROGRESS NOTES
[address]  Elfego Acosta, [address]       GI PROGRESS NOTE  Will Marya [facility]  [phone] office  [phone] NP/PA in-hospital cell phone M-F until 4:30PM  After 5PM or on weekends, please call  for physician on call      NAME: Fatou Rashid   :  1960   MRN:  [id number]       Subjective:   Patient reports that she is sleepy. No nausea, vomiting, or abdominal pain. She is on clears. Objective:     VITALS:   Last 24hrs VS reviewed since prior progress note. Most recent are:  Visit Vitals  BP (!) 142/70 (BP 1 Location: Left arm, BP Patient Position: At rest)   Pulse 91   Temp 98.2 °F (36.8 °C)   Resp 18   Ht 5' 10\" (1.778 m)   Wt 111.9 kg (246 lb 11.1 oz)   SpO2 98%   BMI 35.40 kg/m²       PHYSICAL EXAM:  General: Cooperative, no acute distress    Neurologic:  Alert and oriented  HEENT: EOMI, no scleral icterus   Lungs:  No respiratory distress  Abdomen: Soft, non-distended, no tenderness, no guarding, no rebound.   Extremities: Warm  Psych:   Not anxious or agitated    Lab Data Reviewed:     Recent Results (from the past 24 hour(s))   GLUCOSE, POC    Collection Time: 20  6:01 PM   Result Value Ref Range    Glucose (POC) 115 (H) 65 - 100 mg/dL    Performed by Myron Sanchez    PROTHROMBIN TIME + INR    Collection Time: 20  4:18 AM   Result Value Ref Range    INR 1.9 (H) 0.9 - 1.1      Prothrombin time 19.4 (H) 9.0 - 16.4 sec   METABOLIC PANEL, BASIC    Collection Time: 20  4:18 AM   Result Value Ref Range    Sodium 140 136 - 145 mmol/L    Potassium 3.4 (L) 3.5 - 5.1 mmol/L    Chloride 104 97 - 108 mmol/L    CO2 28 21 - 32 mmol/L    Anion gap 8 5 - 15 mmol/L    Glucose 89 65 - 100 mg/dL    BUN 18 6 - 20 MG/DL    Creatinine 1.66 (H) 0.55 - 1.02 MG/DL    BUN/Creatinine ratio 11 (L) 12 - 20      GFR est AA 38 (L) >60 ml/min/1.73m2    GFR est non-AA 31 (L) >60 ml/min/1.73m2    Calcium 8.7 8.5 - 10.1 MG/DL       Assessment:   · Nausea: UGI series (12/2/20): severe gastroesophageal reflux and mild presbyesophagus; significant delayed emptying of the gastric remnant. · History of gastric bypass  · Chest pain  · Chronic kidney disease  · Anemia  · Atrial fibrillation: on Coumadin (last dose 12/2 PM). INR 1.9.       Patient Active Problem List   Diagnosis Code    History of breast cancer Z85.3    Asthma J45.909    Fe deficiency anemia D50.9    Status post ablation of atrial fibrillation Z98.890, Z86.79    Sick sinus syndrome ([facility]) I49.5    S/P cardiac pacemaker procedure Z95.0    Long term (current) use of anticoagulants Z79.01    Mixed hyperlipidemia E78.2    CKD (chronic kidney disease), stage IV ([facility]) Y79.5    Systolic murmur Q72.5    Essential hypertension I10    PAF (paroxysmal atrial fibrillation) ([facility]) I48.0    Anemia in stage 4 chronic kidney disease ([facility]) N18.4, D63.1    Controlled type 2 diabetes mellitus with stage 4 chronic kidney disease, with long-term current use of insulin ([facility]) E11.22, N18.4, Z79.4    Morbid obesity with BMI of 40.0-44.9, adult ([facility]) E66.01, Z68.41    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, associated with type 2 diabetes mellitus ([facility] A67.8940    Diabetic polyneuropathy associated with type 2 diabetes mellitus ([facility]) E11.42    Venous stasis ulcer of right lower leg with edema of right lower leg ([facility]) I83.019, I83.891, L97.919, R60.9    Diabetic ulcer of right midfoot associated with type 2 diabetes mellitus, with fat layer exposed ([facility] E11.621, L97.412    Foot deformity, right M21.961    Pes cavus Q66.70    H/O bilateral mastectomy Z90.13    Left below-knee amputee ([facility]) Z89.512    S/P atrioventricular rolando ablation Z98.890    Chest pain R07.9     Plan:   · NPO after midnight  · PPI  · Supportive measures: antiemetics PRN  · INR reversal per primary team - Coumadin on hold (last dose 12/2 PM) and receiving vitamin K  · UGI series results were discussed with the patient. Plan for EGD with possible dilation tomorrow with Dr. Betty Rogers. Details and risks of the procedure to include (but not limited to) anesthesia, bleeding, infection, and perforation were discussed. Patient understands and is in agreement with the plan. COVID negative on 11/23. Signed By: Timo Hernandez, [address]     12/3/2020  12:10 PM       GI Attending: Agree with above plan for EGD tomorrow. Will check INR tomorrow AM to decide if FFP will be needed. Clear liquid diet today. NPO after midnight. Please call with any questions. Butch Rogers MD

## 2020-12-03 NOTE — PROGRESS NOTES
Chart checked, pt cleared by nursing. Pt politely yet adamantly declined to work with me. I encouraged her to participate in amb or at least up to the chair and she continued to decline. I reminded her that with her procedure tomorrow we will have less time available for PT and she continued to decline. Per pt (and confirmed by pt's nurse) pt has been amb up to the bathroom with nursing.  Vikash Mcdonald, PT

## 2020-12-03 NOTE — PROGRESS NOTES
Problem: Risk for Spread of Infection  Goal: Prevent transmission of infectious organism to others  Description: Prevent the transmission of infectious organisms to other patients, staff members, and visitors. Outcome: Progressing Towards Goal     Problem: Falls - Risk of  Goal: *Absence of Falls  Description: Document Veatrice Marker Fall Risk and appropriate interventions in the flowsheet.   Outcome: Progressing Towards Goal  Note: Fall Risk Interventions:  Mobility Interventions: Patient to call before getting OOB, PT Consult for mobility concerns, PT Consult for assist device competence, Utilize walker, cane, or other assistive device, Assess mobility with egress test         Medication Interventions: Teach patient to arise slowly, Patient to call before getting OOB    Elimination Interventions: Bed/chair exit alarm, Call light in reach, Stay With Me (per policy), Patient to call for help with toileting needs, Elevated toilet seat, Toilet paper/wipes in reach, Toileting schedule/hourly rounds

## 2020-12-03 NOTE — PROGRESS NOTES
[facility] [facility]  Hospitalist Group                                                                                          Hospitalist Progress Note  Jessica Villar MD  Answering service: [phone] OR [phone] from in house phone              Progress Note    Patient: Debbi Manjarrez MRN: [id number]  SSN: [id number]    YOB: 1960  Age: 63 y.o. Sex: female      Admit Date: 11/23/2020     HPI:     Patient presents with chest pain, s/p exploration of the right clavicular area 11/25 for concern of fluid collection, but no infection or bleeding was found. Patient also with fluid overload currently being diuresed. Still complaining of right-sided chest pain, but improving. On and off nausea. Subjective:     Patient unclear about her care plan, I explained this to her  She wants to eat, advanced to clear liq diet  GI planning for EGD tomorrow due to suspected Gastric pouch outflow obstruction  Stop coumadin and Vit K ordered due to INR 1.9      Assessment and Plan:     Chest pain  -Atypical chest pain, likely musculoskeletal related to recent surgery in the right clavicular area for osteomyelitis  -Underwent exploration of right anterior chest wall wound bed 11/25 per thoracic surgery  -No finding of abscess, infection or hematoma  -Echo with normal EF  -On colchicine for initially suspected pericarditis, chest pain is improving  -Will discontinue colchicine for now could be causing her GI symptoms    Abd pain w/Nausea/vomiting  - barium swallow revealed \"Significantly delayed emptying of the gastric remnant. \"  GI plans for EGD tomorrow with possible dilation of stricture  Coumadin to be held and INR corrected with Vit K and if needed FFP tomorrow    Anasarca  -Being diuresed with Bumex 1 mg twice daily    Nausea  -Now persistent nausea  -GI evaluating, upper GI series today    Urinary tract infection  -Urine culture grew Enterobacter  -Last dose of Levaquin today    Elevated troponin  -No clinical signs and symptoms of acute coronary syndrome  -Echocardiogram pending  -Cardiology evaluating    Hypertension  -Continue hydralazine and metoprolol  -Blood pressure stable     Atrial fibrillation  -Paroxysmal atrial fibrillation, s/p AV node ablation  -Prior history of pacemaker pocket infection with subsequent removal, and replacement with leadless pacemaker  -On Coumadin for anticoagulation, dosing per pharmacy    Anemia  -Normocytic anemia  -H&H stable, continue monitor    Chronic kidney disease stage IV  -Renal function stable, continue monitor    Hypokalemia- replete    Anticipated disposition is 24 to 48 hours pending progress. Objective:     Patient Vitals for the past 24 hrs:   Temp Pulse Resp BP SpO2   12/03/20 0715 98.2 °F (36.8 °C) 91 18 (!) 142/70 98 %   12/03/20 0406 98.1 °F (36.7 °C) 91 18 (!) 145/72 98 %   12/02/20 2302 98.2 °F (36.8 °C) 90 16 134/71 98 %   12/02/20 2144  90  (!) 151/84    12/02/20 1912 97.8 °F (36.6 °C) 90 16 (!) 155/77 98 %   12/02/20 1548 97.9 °F (36.6 °C) 90 16 (!) 179/96 99 %   12/02/20 1154 97.8 °F (36.6 °C) 90 16 (!) 165/84 98 %       Intake and Output:  Current Shift: No intake/output data recorded. Last three shifts: 12/01 1901 - 12/03 0700  In: 200 [I.V.:200]  Out: 1100 [Urine:1100]    Physical Exam:   GENERAL: alert, cooperative, no distress, appears stated age  [de-identified]: normocephalic, atraumatic, EOMI. LUNG: clear to auscultation bilaterally  HEART: regular rate and rhythm,  no murmur,   ABDOMEN: soft, non-tender. EXTREMITIES:  L BKA, R groin wound with dressing, and clear drainage  SKIN: rash around upper chest  NEUROLOGIC: no focal neurological deficits  PSYCHIATRIC: depressed mood and flat affect    Lab/Data Review: All lab results for the last 24 hours reviewed.      Recent Results (from the past 24 hour(s))   GLUCOSE, POC    Collection Time: 12/02/20  6:01 PM   Result Value Ref Range    Glucose (POC) 115 (H) 65 - 100 mg/dL Performed by Rhianna Howard    PROTHROMBIN TIME + INR    Collection Time: 12/03/20  4:18 AM   Result Value Ref Range    INR 1.9 (H) 0.9 - 1.1      Prothrombin time 19.4 (H) 9.0 - 77.6 sec   METABOLIC PANEL, BASIC    Collection Time: 12/03/20  4:18 AM   Result Value Ref Range    Sodium 140 136 - 145 mmol/L    Potassium 3.4 (L) 3.5 - 5.1 mmol/L    Chloride 104 97 - 108 mmol/L    CO2 28 21 - 32 mmol/L    Anion gap 8 5 - 15 mmol/L    Glucose 89 65 - 100 mg/dL    BUN 18 6 - 20 MG/DL    Creatinine 1.66 (H) 0.55 - 1.02 MG/DL    BUN/Creatinine ratio 11 (L) 12 - 20      GFR est AA 38 (L) >60 ml/min/1.73m2    GFR est non-AA 31 (L) >60 ml/min/1.73m2    Calcium 8.7 8.5 - 10.1 MG/DL        Imaging:  Abdominal pain.     COMPARISON: 8/23/2007.     Fluoroscopy dose (PKA, DAP): 115.3 Gycm2     FINDINGS:  The preliminary radiograph of the abdomen demonstrates status post  cholecystectomy. Clips. Surgical clips related to gastric bypass.     A single-contrast examination was performed. The patient ingested barium without  difficulty.      The esophagus is normal in caliber and contour with no mass, constricting lesion  or mucosal abnormality. The esophageal motility is diminished with moderate  presbyesophagus demonstrated  no significant hiatal hernia. Severe  gastroesophageal reflux.     The patient is status post gastric bypass. There is no gastrogastric fistula  demonstrated. There is significantly delayed emptying of the gastric remnant  with 5 and 15 minute images obtained. Minimal passage of contrast material  through the gastrojejunostomy.      IMPRESSION  IMPRESSION:      Severe gastroesophageal reflux and mild presbyesophagus.     Significantly delayed emptying of the gastric remnant. Follow-up abdominal x-ray to evaluate for advancement of contrast distally in  the small bowel is recommended.       Current Facility-Administered Medications:     HYDROmorphone (PF) (DILAUDID) injection 1 mg, 1 mg, IntraMUSCular, Q3H PRN, Anibal Means NP, 1 mg at 12/03/20 0108    phytonadione (vitamin K1) (AQUA-MEPHYTON) 10 mg in 0.9% sodium chloride 50 mL IVPB, 10 mg, IntraVENous, ONCE, Lucreciarefugio Nair MD    promethazine (PHENERGAN) tablet 25 mg, 25 mg, Oral, Q6H PRN, Anibal Means NP, 25 mg at 12/02/20 2151    [Held by provider] ondansetron [facility]) injection 4 mg, 4 mg, IntraVENous, Q4H PRN, Duran Tenorio NP, 4 mg at 12/02/20 1026    bumetanide (BUMEX) injection 1 mg, 1 mg, IntraVENous, BID, Ana Cohen MD, Stopped at 12/02/20 1907    hydrALAZINE (APRESOLINE) tablet 100 mg, 100 mg, Oral, TID, Ana Cohen MD, 100 mg at 12/03/20 0943    metoprolol tartrate (LOPRESSOR) tablet 25 mg, 25 mg, Oral, BID, Ana Cohen MD, 25 mg at 12/03/20 0944    oxyCODONE-acetaminophen (PERCOCET) 5-325 mg per tablet 2 Tab, 2 Tab, Oral, Q6H PRN, Carlos Salguero MD, 2 Tab at 11/26/20 2139    Signed By: Francesco Daly MD     December 3, 2020

## 2020-12-04 NOTE — PROGRESS NOTES
Problem: Risk for Spread of Infection  Goal: Prevent transmission of infectious organism to others  Description: Prevent the transmission of infectious organisms to other patients, staff members, and visitors. Outcome: Progressing Towards Goal     Problem: Falls - Risk of  Goal: *Absence of Falls  Description: Document Min Alatorre Fall Risk and appropriate interventions in the flowsheet.   Outcome: Progressing Towards Goal  Note: Fall Risk Interventions:  Mobility Interventions: Communicate number of staff needed for ambulation/transfer         Medication Interventions: Assess postural VS orthostatic hypotension    Elimination Interventions: Call light in reach

## 2020-12-04 NOTE — PROGRESS NOTES
Bedside and Verbal shift change report given to Ruthy Wilkes RN (oncoming nurse) by Cristiano Bruce RN (offgoing nurse). Report included the following information SBAR, Kardex, Intake/Output, MAR, Recent Results and Cardiac Rhythm Paced.

## 2020-12-04 NOTE — PROGRESS NOTES
12/4/2020 -   TEE:  - RUR: 30%  - Disposition is TBD dependent on progression: possible need for [facility] services  - Transport to be provided by spouse    - Patient declined therapies 12/3   - Patient had midline placed 12/3  - Patient will need to work with therapies today, 12/4  - Patient to have EGD today, 12/4  CRM: Kiet Castillo, MPH, [address] [place] [address]; Z: [phone]

## 2020-12-04 NOTE — PROGRESS NOTES
Problem: Risk for Spread of Infection  Goal: Prevent transmission of infectious organism to others  Description: Prevent the transmission of infectious organisms to other patients, staff members, and visitors. Outcome: Progressing Towards Goal     Problem: Falls - Risk of  Goal: *Absence of Falls  Description: Document Sundar Checo Fall Risk and appropriate interventions in the flowsheet. Outcome: Progressing Towards Goal  Note: Fall Risk Interventions:  Mobility Interventions: Patient to call before getting OOB, PT Consult for mobility concerns, PT Consult for assist device competence, OT consult for ADLs, Communicate number of staff needed for ambulation/transfer    Medication Interventions: Evaluate medications/consider consulting pharmacy, Patient to call before getting OOB, Teach patient to arise slowly    Elimination Interventions: Call light in reach, Patient to call for help with toileting needs, Stay With Me (per policy), Toileting schedule/hourly rounds         Problem: Pressure Injury - Risk of  Goal: *Prevention of pressure injury  Description: Document Valdemar Scale and appropriate interventions in the flowsheet. Outcome: Progressing Towards Goal  Note: Pressure Injury Interventions:  Sensory Interventions: Assess changes in LOC, Assess need for specialty bed, Avoid rigorous massage over bony prominences, Check visual cues for pain, Discuss PT/OT consult with provider, Keep linens dry and wrinkle-free, Maintain/enhance activity level, Minimize linen layers, Monitor skin under medical devices, Pad between skin to skin, Pressure redistribution bed/mattress (bed type), Sit a 90-degree angle/use footstool if needed, Turn and reposition approx.  every two hours (pillows and wedges if needed), Use 30-degree side-lying position    Moisture Interventions: Absorbent underpads, Internal/External urinary devices    Activity Interventions: Pressure redistribution bed/mattress(bed type), PT/OT evaluation, Increase time out of bed    Mobility Interventions: HOB 30 degrees or less, Pressure redistribution bed/mattress (bed type), PT/OT evaluation    Nutrition Interventions: Document food/fluid/supplement intake, Offer support with meals,snacks and hydration    Friction and Shear Interventions: HOB 30 degrees or less, Lift sheet

## 2020-12-04 NOTE — PROGRESS NOTES
Pt is off the unit at [facility]. PT will defer, follow and see as able and appropriate. Thank you. For the weekend, recommend patient to complete as able in order to maintain strength, endurance and independence with nursing: OOB to chair 3x/day with assist X 1 using her prosthesis and walker and ambulating with her prosthesis and walker. PT to follow-up with patient after the weekend.  Tomasa Fuentes, PT

## 2020-12-04 NOTE — PROGRESS NOTES
Bedside and Verbal shift change report given to [facility] (oncoming nurse) by Ethel Winn (offgoing nurse). Report included the following information SBAR.

## 2020-12-04 NOTE — PROGRESS NOTES
TRANSFER - IN REPORT:    Verbal report received fromLEANDRA  rn(name) on Fatou Rashid  being received from 354(unit) for ordered procedure      Report consisted of patients Situation, Background, Assessment and   Recommendations(SBAR). Information from the following report(s) SBAR was reviewed with the receiving nurse. Opportunity for questions and clarification was provided. Assessment completed upon patients arrival to unit and care assumed.

## 2020-12-04 NOTE — ANESTHESIA POSTPROCEDURE EVALUATION
Procedure(s):  ESOPHAGOGASTRODUODENOSCOPY (EGD). MAC    Anesthesia Post Evaluation        Patient location during evaluation: PACU  Note status: Adequate. Level of consciousness: responsive to verbal stimuli and sleepy but conscious  Pain management: satisfactory to patient  Airway patency: patent  Anesthetic complications: no  Cardiovascular status: acceptable  Respiratory status: acceptable  Hydration status: acceptable  Comments: +Post-Anesthesia Evaluation and Assessment    Patient: Juliocesar Juarez MRN: [id number]  SSN: [id number]   YOB: 1960  Age: 63 y.o. Sex: female          Cardiovascular Function/Vital Signs    BP (!) 139/58   Pulse 91   Temp 36.7 °C (98 °F)   Resp 16   Ht 5' 10\" (1.778 m)   Wt 109.1 kg (240 lb 8.4 oz)   SpO2 97%   Breastfeeding No   BMI 34.51 kg/m²     Patient is status post Procedure(s):  ESOPHAGOGASTRODUODENOSCOPY (EGD). Nausea/Vomiting: Controlled. Postoperative hydration reviewed and adequate. Pain:  Pain Scale 1: Numeric (0 - 10) (12/04/20 1210)  Pain Intensity 1: 0 (12/04/20 1210)   Managed. Neurological Status:   Neuro (WDL): Within Defined Limits (11/25/20 1033)   At baseline. Mental Status and Level of Consciousness: Arousable. Pulmonary Status:   O2 Device: Room air (12/04/20 1210)   Adequate oxygenation and airway patent. Complications related to anesthesia: None    Post-anesthesia assessment completed. No concerns. I have evaluated the patient and the patient is stable and ready to be discharged from PACU . Signed By: Alan Conrad MD    12/4/2020        INITIAL Post-op Vital signs:   Vitals Value Taken Time   /58 12/4/2020 12:25 PM   Temp     Pulse 91 12/4/2020 12:28 PM   Resp 14 12/4/2020 12:28 PM   SpO2 96 % 12/4/2020 12:28 PM   Vitals shown include unvalidated device data.

## 2020-12-04 NOTE — PROCEDURES
[facility]  [address] [facility], [address] [place] [address]        Esophago- Gastroduodenoscopy (EGD) Procedure Note    Spencer Ahuja  1960  [id number]      Procedure: Endoscopic Gastroduodenoscopy --diagnostic    Indication: nausea and vomiting, history of gastric bypass    Pre-operative Diagnosis: see indication above    Post-operative Diagnosis: see findings below    : Leni Bond. Mirta Kirkpatrick MD    Referring Provider:  Kyle Lester MD      Anesthesia/Sedation:  MAC anesthesia Propofol        Procedure Details     After informed consent was obtained for the procedure, with all risks and benefits of procedure explained the patient was taken to the endoscopy suite and placed in the left lateral decubitus position. Following sequential administration of sedation as per above, the endoscope was inserted into the mouth and advanced under direct vision to distal Jose limb. A careful inspection was made as the gastroscope was withdrawn, including a retroflexed view of the proximal stomach; findings and interventions are described below. Findings:   Esophagus: normal, including normal Z line at 40 cm  Gastric pouch: conically shaped, 8 cm in length. GJ anastomosis was patent at 48 cm with 10 mm clean-based marginal ulceration on jejunal side of anastomosis. GJ anastomosis was patent with estimated diameter of 12-13 mm. Blind end terminated at 52 cm. Jose limb normal to length of upper endoscope. JJ anastomosis not reached. No bile reflux seen. Therapies:  none    Specimens: none         EBL: None      Complications:   None; patient tolerated the procedure well. Impression:    1. Marginal ulcer    Recommendations:  1. BID PPI for two months  2. Carafate 1g QID for 14 days  3. Repeat EGD in 2 months  4. Avoid non-essential NSAID's, alcohol, and tobacco products  5. Clear liquid diet to be advanced as tolerated  6. Okay to restart warfarin. Monitor CBC  7.  We will see again on request. Please call with any questions. Signed By: Zachary Colón.  Gerber Hartmann MD     12/4/2020  12:12 PM

## 2020-12-04 NOTE — ANESTHESIA PREPROCEDURE EVALUATION
Relevant Problems   No relevant active problems       Anesthetic History   No history of anesthetic complications            Review of Systems / Medical History  Patient summary reviewed, nursing notes reviewed and pertinent labs reviewed    Pulmonary  Within defined limits          Asthma        Neuro/Psych   Within defined limits           Cardiovascular  Within defined limits  Hypertension        Dysrhythmias       Exercise tolerance: >4 METS     GI/Hepatic/Renal  Within defined limits              Endo/Other  Within defined limits  Diabetes    Morbid obesity and arthritis     Other Findings              Physical Exam    Airway  Mallampati: II  TM Distance: > 6 cm  Neck ROM: normal range of motion   Mouth opening: Normal     Cardiovascular  Regular rate and rhythm,  S1 and S2 normal,  no murmur, click, rub, or gallop             Dental  No notable dental hx       Pulmonary  Breath sounds clear to auscultation               Abdominal  GI exam deferred       Other Findings            Anesthetic Plan    ASA: 3  Anesthesia type: MAC          Induction: Intravenous  Anesthetic plan and risks discussed with: Patient

## 2020-12-05 NOTE — PROGRESS NOTES
Pharmacist Note - Warfarin Dosing  Consult provided for this 63 y. o.female to manage warfarin for Atrial Fibrillation    INR Goal: 2 - 3    Home regimen: 2 mg M-W-FR and 1 mg all other days  Daily INR ordered: YES    Recent Labs     12/05/20  0353 12/04/20  0350 12/03/20  0418   INR 1.3* 1.3* 1.9*     Date               INR                  Dose  11/23               1.9                   Held  11/24                --                     Held  11/25                --                     2 mg   11/26              1.5                    1 mg   11/27              1.3                    2 mg  11/28              1.4                    2 mg  11/29              1.5                    2.5 mg  11/30              1.6                    2.5 mg  12/1                1.8                    2 mg  12/2                 2                      2 mg   12/3  Warfarin discontinued for procedure   12/4                1.3                    3 mg  12/5                1.3                    3 mg                                                                               Assessment/ Plan: Will order warfarin 3mg PO x 1 dose. Pharmacy will continue to monitor daily and adjust therapy as indicated. Please contact the pharmacist at  for outpatient recommendations if needed.      Steven Early, PharmD, BCPP, [facility]ist, [address] [place] [address]

## 2020-12-05 NOTE — PROGRESS NOTES
[facility] Adult  Hospitalist Group                                                                                          Hospitalist Progress Note  Maykel Paz MD  Answering service: [phone] OR [phone] from in house phone              Progress Note    Patient: Brigid Damian MRN: [id number]  SSN: [id number]    YOB: 1960  Age: 63 y.o. Sex: female      Admit Date: 11/23/2020     HPI:     Patient presents with chest pain, s/p exploration of the right clavicular area 11/25 for concern of fluid collection, but no infection or bleeding was found. Patient also with fluid overload currently being diuresed. Still complaining of right-sided chest pain, but improving. On and off nausea. Subjective:     Patient not happy with her clear liq diet today  GI completed EGD - no source of active bleeding noted- gastritis mentioned  resume coumadin   Possible DC soon    Assessment and Plan:     Chest pain  -Atypical chest pain, likely musculoskeletal related to recent surgery in the right clavicular area for osteomyelitis  -Underwent exploration of right anterior chest wall wound bed 11/25 per thoracic surgery  -No finding of abscess, infection or hematoma  -Echo with normal EF  -On colchicine for initially suspected pericarditis, chest pain is improving  -Will discontinue colchicine for now could be causing her GI symptoms    Abd pain w/Nausea/vomiting  - barium swallow revealed \"Significantly delayed emptying of the gastric remnant. \"  GI completed EGD today with - no stricture found, gastritis and ulcer noted- on BID PPI and carafate  Coumadin to be restarted    Anasarca  -Being diuresed with Bumex     Nausea- improved  -likely related to noncompliance with dietary recs    Urinary tract infection  -Urine culture grew Enterobacter   Levaquin completed    Elevated troponin  -No clinical signs and symptoms of acute coronary syndrome  -Echocardiogram showing normal EF  -Cardiology clive completed    Hypertension  -Continue hydralazine and metoprolol  -Blood pressure stable     Atrial fibrillation  -Paroxysmal atrial fibrillation, s/p AV node ablation  -Prior history of pacemaker pocket infection with subsequent removal, and replacement with leadless pacemaker  -On Coumadin for anticoagulation, dosing per pharmacy    Anemia  -Normocytic anemia  -H&H stable, continue monitor    Chronic kidney disease stage IV  -Renal function stable, continue monitor    Hypokalemia- replete    Anticipated disposition is 24 to 48 hours pending progress. Objective:     Patient Vitals for the past 24 hrs:   Temp Pulse Resp BP SpO2   12/04/20 1917 97.9 °F (36.6 °C) 91 16 (!) 146/65 99 %   12/04/20 1617 97.6 °F (36.4 °C) 90 14 (!) 138/55 98 %   12/04/20 1345  90  (!) 157/61    12/04/20 1225  91 16 (!) 139/58 97 %   12/04/20 1220  91 14 (!) 135/54 95 %   12/04/20 1215  92 13 (!) 136/49 97 %   12/04/20 1210  91 12 (!) 126/58 97 %   12/04/20 1206  93 17 (!) 117/51 92 %   12/04/20 1201  93 19 (!) 100/54 98 %   12/04/20 1141 98 °F (36.7 °C) 91 16 (!) 128/54 98 %   12/04/20 1111 98.2 °F (36.8 °C) 90 16 (!) 120/51 95 %   12/04/20 0809 98.3 °F (36.8 °C) 91 16 (!) 128/45 95 %   12/04/20 0345 97.9 °F (36.6 °C) 91 16 (!) 116/46 95 %   12/03/20 2319 97.9 °F (36.6 °C) 90 16 (!) 143/68 96 %       Intake and Output:  Current Shift: 12/04 1901 - 12/05 0700  In: -   Out: 800 [Urine:800]  Last three shifts: 12/03 0701 - 12/04 1900  In: 760 [P.O.:760]  Out: 1400 [Urine:1400]    Physical Exam:   GENERAL: alert, cooperative, no distress, appears stated age  [de-identified]: normocephalic, atraumatic, EOMI. LUNG: clear to auscultation bilaterally  HEART: regular rate and rhythm,  no murmur,   ABDOMEN: soft, non-tender. EXTREMITIES:  L BKA, R groin wound with dressing, and clear drainage  SKIN: rash around upper chest  NEUROLOGIC: no focal neurological deficits  PSYCHIATRIC: depressed mood and flat affect    Lab/Data Review:   All lab results for the last 24 hours reviewed. Recent Results (from the past 24 hour(s))   PROTHROMBIN TIME + INR    Collection Time: 12/04/20  3:50 AM   Result Value Ref Range    INR 1.3 (H) 0.9 - 1.1      Prothrombin time 13.8 (H) 9.0 - 11.1 sec        Imaging:  Abdominal pain.     COMPARISON: 8/23/2007.     Fluoroscopy dose (PKA, DAP): 115.3 Gycm2     FINDINGS:  The preliminary radiograph of the abdomen demonstrates status post  cholecystectomy. Clips. Surgical clips related to gastric bypass.     A single-contrast examination was performed. The patient ingested barium without  difficulty.      The esophagus is normal in caliber and contour with no mass, constricting lesion  or mucosal abnormality. The esophageal motility is diminished with moderate  presbyesophagus demonstrated  no significant hiatal hernia. Severe  gastroesophageal reflux.     The patient is status post gastric bypass. There is no gastrogastric fistula  demonstrated. There is significantly delayed emptying of the gastric remnant  with 5 and 15 minute images obtained. Minimal passage of contrast material  through the gastrojejunostomy.      IMPRESSION  IMPRESSION:      Severe gastroesophageal reflux and mild presbyesophagus.     Significantly delayed emptying of the gastric remnant. Follow-up abdominal x-ray to evaluate for advancement of contrast distally in  the small bowel is recommended.       Current Facility-Administered Medications:     sodium chloride (NS) flush 5-40 mL, 5-40 mL, IntraVENous, Q8H, Butch Fuentes MD, 10 mL at 12/04/20 2100    sodium chloride (NS) flush 5-40 mL, 5-40 mL, IntraVENous, PRN, Butch Fuentes MD    sucralfate (CARAFATE) tablet 1 g, 1 g, Oral, AC&HS, Shantell Hampton MD, 1 g at 12/04/20 2100    Warfarin- pharmacy to dose, , Other, Rx Dosing/Monitoring, Svetlana Shin MD    pantoprazole (PROTONIX) 40 mg in 0.9% sodium chloride 10 mL injection, 40 mg, IntraVENous, DAILY, Demetra Dubin, [address], 40 mg at 12/04/20 0957    HYDROmorphone (PF) (DILAUDID) injection 1 mg, 1 mg, IntraVENous, Q3H PRN, Lucrecia Nair MD, 1 mg at 12/04/20 2015    promethazine (PHENERGAN) tablet 25 mg, 25 mg, Oral, Q6H PRN, Anibal Menas NP, 25 mg at 12/02/20 2151    [Held by provider] [facility]etron [facility]) injection 4 mg, 4 mg, IntraVENous, Q4H PRN, Duran Tenorio NP, 4 mg at 12/02/20 1026    bumetanide (BUMEX) injection 1 mg, 1 mg, IntraVENous, BID, Ana Cohen MD, 1 mg at 12/04/20 1345    hydrALAZINE (APRESOLINE) tablet 100 mg, 100 mg, Oral, TID, Ana Cohen MD, 100 mg at 12/04/20 2100    metoprolol tartrate (LOPRESSOR) tablet 25 mg, 25 mg, Oral, BID, Ana Cohen MD, 25 mg at 12/04/20 1801    oxyCODONE-acetaminophen (PERCOCET) 5-325 mg per tablet 2 Tab, 2 Tab, Oral, Q6H PRN, Carlos Salguero MD, 2 Tab at 11/26/20 2139    Signed By: Francesco Daly MD     December 4, 2020

## 2020-12-05 NOTE — PROGRESS NOTES
Bedside shift change report given to Leandro Strickland RN (oncoming nurse) by Ilan Barr RN (offgoing nurse). Report included the following information SBAR, Kardex, Intake/Output, MAR, Recent Results and Cardiac Rhythm Paced.

## 2020-12-05 NOTE — PROGRESS NOTES
Problem: Risk for Spread of Infection  Goal: Prevent transmission of infectious organism to others  Description: Prevent the transmission of infectious organisms to other patients, staff members, and visitors. Outcome: Progressing Towards Goal     Problem: Falls - Risk of  Goal: *Absence of Falls  Description: Document Cici Ludwin Fall Risk and appropriate interventions in the flowsheet. Outcome: Progressing Towards Goal  Note: Fall Risk Interventions:  Mobility Interventions: Communicate number of staff needed for ambulation/transfer, Patient to call before getting OOB         Medication Interventions: Evaluate medications/consider consulting pharmacy, Patient to call before getting OOB    Elimination Interventions: Call light in reach, Patient to call for help with toileting needs              Problem: Pressure Injury - Risk of  Goal: *Prevention of pressure injury  Description: Document Valdemar Scale and appropriate interventions in the flowsheet.   Outcome: Progressing Towards Goal  Note: Pressure Injury Interventions:  Sensory Interventions: Keep linens dry and wrinkle-free, Minimize linen layers    Moisture Interventions: Absorbent underpads, Internal/External urinary devices    Activity Interventions: Increase time out of bed, Pressure redistribution bed/mattress(bed type)    Mobility Interventions: HOB 30 degrees or less, Pressure redistribution bed/mattress (bed type)    Nutrition Interventions: Document food/fluid/supplement intake    Friction and Shear Interventions: Minimize layers                Problem: Heart Failure: Discharge Outcomes  Goal: *Verbalizes understanding and describes prescribed diet  Outcome: Not Progressing Towards Goal

## 2020-12-05 NOTE — PROGRESS NOTES
[facility] Adult  Hospitalist Group                                                                                          Hospitalist Progress Note  Jessica Villar MD  Answering service: [phone] OR [phone] from in house phone              Progress Note    Patient: Debbi Manjarrez MRN: [id number]  SSN: [id number]    YOB: 1960  Age: 63 y.o. Sex: female      Admit Date: 11/23/2020     HPI:     Patient presents with chest pain, s/p exploration of the right clavicular area 11/25 for concern of fluid collection, but no infection or bleeding was found. Patient also with fluid overload currently being diuresed. Still complaining of right-sided chest pain, but improving. On and off nausea. Subjective:     Diet advanced today  restarted coumadin   Transitioned all IV meds to PO    Assessment and Plan:     Chest pain-resolved  -Atypical chest pain, likely musculoskeletal related to recent surgery in the right clavicular area for osteomyelitis  -Underwent exploration of right anterior chest wall wound bed 11/25 per thoracic surgery  -No finding of abscess, infection or hematoma  -Echo with normal EF    Abd pain w/Nausea/vomiting  - barium swallow revealed \"Significantly delayed emptying of the gastric remnant. \"  GI completed EGD today with - no stricture found, gastritis and ulcer noted- on BID PPI and carafate  Coumadin to be restarted    Anasarca  -Being diuresed with Bumex     Nausea- improved  -likely related to noncompliance with dietary recs    Urinary tract infection  -Urine culture grew Enterobacter   Levaquin completed    Elevated troponin  -No clinical signs and symptoms of acute coronary syndrome  -Echocardiogram showing normal EF  -Cardiology eval completed    Hypertension  -Continue hydralazine and metoprolol  -Blood pressure stable     Atrial fibrillation  -Paroxysmal atrial fibrillation, s/p AV node ablation  -Prior history of pacemaker pocket infection with subsequent removal, and replacement with leadless pacemaker  -On Coumadin for anticoagulation, dosing per pharmacy    Anemia  -Normocytic anemia  -H&H stable, continue monitor    Chronic kidney disease stage IV  -Renal function stable, continue monitor    Candida vaginalis- started diflucan    Hypokalemia- replete    Anticipated disposition is 24 to 48 hours pending progress. Objective:     Patient Vitals for the past 24 hrs:   Temp Pulse Resp BP SpO2   12/05/20 0855  91  (!) 144/70    12/05/20 0807 98.1 °F (36.7 °C) 91 18 (!) 88/53 98 %   12/05/20 0350 97.9 °F (36.6 °C) 91 18 (!) 108/39 95 %   12/04/20 2324 98.6 °F (37 °C) 92 20 (!) 124/44 96 %   12/04/20 1917 97.9 °F (36.6 °C) 91 16 (!) 146/65 99 %   12/04/20 1617 97.6 °F (36.4 °C) 90 14 (!) 138/55 98 %   12/04/20 1345  90  (!) 157/61    12/04/20 1225  91 16 (!) 139/58 97 %   12/04/20 1220  91 14 (!) 135/54 95 %   12/04/20 1215  92 13 (!) 136/49 97 %   12/04/20 1210  91 12 (!) 126/58 97 %   12/04/20 1206  93 17 (!) 117/51 92 %   12/04/20 1201  93 19 (!) 100/54 98 %   12/04/20 1141 98 °F (36.7 °C) 91 16 (!) 128/54 98 %   12/04/20 1111 98.2 °F (36.8 °C) 90 16 (!) 120/51 95 %       Intake and Output:  Current Shift: No intake/output data recorded. Last three shifts: 12/03 1901 - 12/05 0700  In: 360 [P.O.:360]  Out: 2250 [Urine:2250]    Physical Exam:   GENERAL: alert, cooperative, no distress, appears stated age  [de-identified]: normocephalic, atraumatic, EOMI. LUNG: clear to auscultation bilaterally  HEART: regular rate and rhythm,  no murmur,   ABDOMEN: soft, non-tender. EXTREMITIES:  L BKA, R groin wound with dressing, and clear drainage  SKIN: rash around upper chest  NEUROLOGIC: no focal neurological deficits  PSYCHIATRIC: depressed mood and flat affect    Lab/Data Review: All lab results for the last 24 hours reviewed.      Recent Results (from the past 24 hour(s))   PROTHROMBIN TIME + INR    Collection Time: 12/05/20  3:53 AM   Result Value Ref Range    INR 1.3 (H) 0.9 - 1.1      Prothrombin time 13.0 (H) 9.0 - 11.1 sec        Imaging:  Abdominal pain.     COMPARISON: 8/23/2007.     Fluoroscopy dose (PKA, DAP): 115.3 Gycm2     FINDINGS:  The preliminary radiograph of the abdomen demonstrates status post  cholecystectomy. Clips. Surgical clips related to gastric bypass.     A single-contrast examination was performed. The patient ingested barium without  difficulty.      The esophagus is normal in caliber and contour with no mass, constricting lesion  or mucosal abnormality. The esophageal motility is diminished with moderate  presbyesophagus demonstrated  no significant hiatal hernia. Severe  gastroesophageal reflux.     The patient is status post gastric bypass. There is no gastrogastric fistula  demonstrated. There is significantly delayed emptying of the gastric remnant  with 5 and 15 minute images obtained. Minimal passage of contrast material  through the gastrojejunostomy.      IMPRESSION  IMPRESSION:      Severe gastroesophageal reflux and mild presbyesophagus.     Significantly delayed emptying of the gastric remnant. Follow-up abdominal x-ray to evaluate for advancement of contrast distally in  the small bowel is recommended.       Current Facility-Administered Medications:     warfarin (COUMADIN) tablet 3 mg, 3 mg, Oral, ONCE, Noris Clemons MD    fluconazole (DIFLUCAN) tablet 200 mg, 200 mg, Oral, ONCE, Noris Clemons MD    bumetanide (BUMEX) tablet 1 mg, 1 mg, Oral, BID, Noris Clemons MD    pantoprazole (PROTONIX) tablet 40 mg, 40 mg, Oral, ACB&D, Noris Clemons MD    sertraline (ZOLOFT) tablet 125 mg, 125 mg, Oral, DAILY, Noris Clemons MD    HYDROmorphone (DILAUDID) tablet 2 mg, 2 mg, Oral, Q4H PRN, Noris Clemons MD    ondansetron (ZOFRAN ODT) tablet 4 mg, 4 mg, Oral, Q8H PRN, Noris Clemons MD    ondansetron [facility]) injection 4 mg, 4 mg, IntraVENous, Q4H PRN, Noris Clemons MD    sodium chloride (NS) flush 5-40 mL, 5-40 mL, IntraVENous, Q8H, Butch Fuentes MD, 10 mL at 12/05/20 5164    sodium chloride (NS) flush 5-40 mL, 5-40 mL, IntraVENous, PRN, Butch Fuentes MD    sucralfate (CARAFATE) tablet 1 g, 1 g, Oral, AC&HS, Brooklyn Alejandro MD, 1 g at 12/05/20 1291    Warfarin- pharmacy to dose, , Other, Rx Dosing/Monitoring, Kiley Juarez MD    hydrALAZINE (APRESOLINE) tablet 100 mg, 100 mg, Oral, TID, Ana Cohen MD, 100 mg at 12/05/20 0858    metoprolol tartrate (LOPRESSOR) tablet 25 mg, 25 mg, Oral, BID, Ana Cohen MD, 25 mg at 12/05/20 0858    oxyCODONE-acetaminophen (PERCOCET) 5-325 mg per tablet 2 Tab, 2 Tab, Oral, Q6H PRN, Mandi Ragland MD, 2 Tab at 11/26/20 5680    Signed By: Kathi Ellis MD     December 5, 2020

## 2020-12-05 NOTE — PROGRESS NOTES
Problem: Patient Education:  Go to Education Activity  Goal: Patient/Family Education  Outcome: Progressing Towards Goal

## 2020-12-06 NOTE — PROGRESS NOTES
Bedside and Verbal shift change report given to Marco Antonio Rust RN (oncoming nurse) by Landon RN (offgoing nurse). Report included the following information SBAR, Kardex, Intake/Output, MAR and Recent Results.

## 2020-12-06 NOTE — PROGRESS NOTES
Bedside shift change report given to GURINDER Mcgowan (oncoming nurse) by Marsha Max RN (offgoing nurse). Report included the following information SBAR, Kardex, [facility], Recent Results and Cardiac Rhythm Paced.

## 2020-12-06 NOTE — PROGRESS NOTES
Pharmacist Note - Warfarin Dosing  Consult provided for this 63 y. o.female to manage warfarin for Atrial Fibrillation    INR Goal: 2 - 3    Home regimen: 2 mg M-W-FR and 1 mg all other days  Daily INR ordered: YES    Recent Labs     12/05/20  0353 12/04/20  0350 12/03/20  0418   INR 1.3* 1.3* 1.9*     Date               INR                  Dose  11/23               1.9                   Held  11/24                --                     Held  11/25                --                     2 mg   11/26              1.5                    1 mg   11/27              1.3                    2 mg  11/28              1.4                    2 mg  11/29              1.5                    2.5 mg  11/30              1.6                    2.5 mg  12/1                1.8                    2 mg  12/2                 2                      2 mg   12/3  Warfarin discontinued for procedure   12/4                1.3                    3 mg  12/5                1.3                    3 mg  12/6                Refused           2 mg                                                                               Assessment/ Plan:  Patient refused INR draw multiple times today, will go ahead and order warfarin 2mg PO x 1 dose. Pharmacy will continue to monitor daily and adjust therapy as indicated. Please contact the pharmacist at  for outpatient recommendations if needed.      Coy Lemons, PharmD, BCPP, [facility] Specialist, [address]

## 2020-12-06 NOTE — PROGRESS NOTES
Problem: Risk for Spread of Infection  Goal: Prevent transmission of infectious organism to others  Description: Prevent the transmission of infectious organisms to other patients, staff members, and visitors. Outcome: Progressing Towards Goal     Problem: Falls - Risk of  Goal: *Absence of Falls  Description: Document Cherry Thomas Fall Risk and appropriate interventions in the flowsheet. Outcome: Progressing Towards Goal  Note: Fall Risk Interventions:  Mobility Interventions: Patient to call before getting OOB, Communicate number of staff needed for ambulation/transfer         Medication Interventions: Teach patient to arise slowly, Patient to call before getting OOB    Elimination Interventions: Call light in reach              Problem: Pressure Injury - Risk of  Goal: *Prevention of pressure injury  Description: Document Valdemar Scale and appropriate interventions in the flowsheet.   Outcome: Progressing Towards Goal  Note: Pressure Injury Interventions:  Sensory Interventions: Minimize linen layers, Maintain/enhance activity level, Keep linens dry and wrinkle-free    Moisture Interventions: Absorbent underpads, Apply protective barrier, creams and emollients, Minimize layers    Activity Interventions: Increase time out of bed, Pressure redistribution bed/mattress(bed type)    Mobility Interventions: Assess need for specialty bed, HOB 30 degrees or less    Nutrition Interventions: Document food/fluid/supplement intake    Friction and Shear Interventions: Minimize layers                Problem: Heart Failure: Discharge Outcomes  Goal: *Verbalizes understanding and describes prescribed diet  Outcome: Not Progressing Towards Goal

## 2020-12-07 NOTE — PROGRESS NOTES
[address] [facility]  Hospitalist Group                                                                                          Hospitalist Progress Note  Marilin Arreola MD  Answering service: [phone] OR [phone] from in house phone              Progress Note    Patient: Gwen Yeboah MRN: [id number]  SSN: [id number]    YOB: 1960  Age: 63 y.o. Sex: female      Admit Date: 11/23/2020     HPI:     Patient presents with chest pain, s/p exploration of the right clavicular area 11/25 for concern of fluid collection, but no infection or bleeding was found. Patient also with fluid overload currently being diuresed. Still complaining of right-sided chest pain, but improving. On and off nausea. Subjective:     Diet advanced today  restarted coumadin   Transitioned all IV meds to PO    Assessment and Plan:     Chest pain-resolved  -Atypical chest pain, likely musculoskeletal related to recent surgery in the right clavicular area for osteomyelitis  -Underwent exploration of right anterior chest wall wound bed 11/25 per thoracic surgery  -No finding of abscess, infection or hematoma  -Echo with normal EF    Abd pain w/Nausea/vomiting  - barium swallow revealed \"Significantly delayed emptying of the gastric remnant. \"  GI completed EGD today with - no stricture found, gastritis and ulcer noted- on BID PPI and carafate  Coumadin to be restarted    Anasarca  -Being diuresed with Bumex     Nausea- improved  -likely related to noncompliance with dietary recs    Urinary tract infection  -Urine culture grew Enterobacter   Levaquin completed    Elevated troponin  -No clinical signs and symptoms of acute coronary syndrome  -Echocardiogram showing normal EF  -Cardiology eval completed    Hypertension  -Continue hydralazine and metoprolol  -Blood pressure stable     Atrial fibrillation  -Paroxysmal atrial fibrillation, s/p AV node ablation  -Prior history of pacemaker pocket infection with subsequent removal, and replacement with leadless pacemaker  -On Coumadin for anticoagulation, dosing per pharmacy    Anemia  -Normocytic anemia  -H&H stable, continue monitor    Chronic kidney disease stage IV  -Renal function stable, continue monitor    Candida vaginalis- started diflucan    Hypokalemia- replete    Anticipated disposition is 24 to 48 hours pending progress. Objective:     Patient Vitals for the past 24 hrs:   Temp Pulse Resp BP SpO2   12/06/20 1717  90  (!) 106/42    12/06/20 1633 97.7 °F (36.5 °C) 92 16 (!) 108/90 98 %   12/06/20 1204  90  (!) 115/46    12/06/20 1130 98.6 °F (37 °C) 93 16 (!) 141/52 96 %   12/06/20 0734 98.4 °F (36.9 °C) 90 16 (!) 119/43 97 %   12/06/20 0350 98.5 °F (36.9 °C) 90 16 (!) 110/42 93 %   12/05/20 2308 98.4 °F (36.9 °C) 91 16 (!) 116/43 96 %       Intake and Output:  Current Shift: No intake/output data recorded. Last three shifts: 12/05 0701 - 12/06 1900  In: 1000 [P.O.:1000]  Out: 400 [Urine:400]    Physical Exam:   GENERAL: alert, cooperative, no distress, appears stated age  [de-identified]: normocephalic, atraumatic, EOMI. LUNG: clear to auscultation bilaterally  HEART: regular rate and rhythm,  no murmur,   ABDOMEN: soft, non-tender. EXTREMITIES:  L BKA, R groin wound with dressing, and clear drainage  SKIN: rash around upper chest  NEUROLOGIC: no focal neurological deficits  PSYCHIATRIC: depressed mood and flat affect    Lab/Data Review: All lab results for the last 24 hours reviewed. No results found for this or any previous visit (from the past 24 hour(s)). Imaging:  Abdominal pain.     COMPARISON: 8/23/2007.     Fluoroscopy dose (PKA, DAP): 115.3 Gycm2     FINDINGS:  The preliminary radiograph of the abdomen demonstrates status post  cholecystectomy. Clips. Surgical clips related to gastric bypass.     A single-contrast examination was performed.  The patient ingested barium without  difficulty.      The esophagus is normal in caliber and contour with no mass, constricting lesion  or mucosal abnormality. The esophageal motility is diminished with moderate  presbyesophagus demonstrated  no significant hiatal hernia. Severe  gastroesophageal reflux.     The patient is status post gastric bypass. There is no gastrogastric fistula  demonstrated. There is significantly delayed emptying of the gastric remnant  with 5 and 15 minute images obtained. Minimal passage of contrast material  through the gastrojejunostomy.      IMPRESSION  IMPRESSION:      Severe gastroesophageal reflux and mild presbyesophagus.     Significantly delayed emptying of the gastric remnant. Follow-up abdominal x-ray to evaluate for advancement of contrast distally in  the small bowel is recommended.       Current Facility-Administered Medications:     bumetanide (BUMEX) tablet 1 mg, 1 mg, Oral, BID, Ami Bess MD, 1 mg at 12/06/20 1717    pantoprazole (PROTONIX) tablet 40 mg, 40 mg, Oral, ACB&D, Ami Bess MD, 40 mg at 12/06/20 1717    sertraline (ZOLOFT) tablet 125 mg, 125 mg, Oral, DAILY, Ami Bess MD, 125 mg at 12/06/20 1205    HYDROmorphone (DILAUDID) tablet 2 mg, 2 mg, Oral, Q4H PRN, Ami Bess MD, 2 mg at 12/06/20 0405    ondansetron (ZOFRAN ODT) tablet 4 mg, 4 mg, Oral, Q8H PRN, Ami Bess MD    ondansetron [facility]) injection 4 mg, 4 mg, IntraVENous, Q4H PRN, Ami Bess MD, 4 mg at 12/05/20 1615    sodium chloride (NS) flush 5-40 mL, 5-40 mL, IntraVENous, Q8H, Michael WALKER MD, 10 mL at 12/06/20 1400    sodium chloride (NS) flush 5-40 mL, 5-40 mL, IntraVENous, PRN, Butch Fuentes MD    sucralfate (CARAFATE) tablet 1 g, 1 g, Oral, AC&HS, Reza Horne MD, 1 g at 12/06/20 1204    Warfarin- pharmacy to dose, , Other, Rx Dosing/Monitoring, Ami Bess MD    hydrALAZINE (APRESOLINE) tablet 100 mg, 100 mg, Oral, TID, Ana Cohen MD, 100 mg at 12/06/20 1204    metoprolol tartrate (LOPRESSOR) tablet 25 mg, 25 mg, Oral, BID, Ana Cohen MD, 25 mg at 12/06/20 1717    oxyCODONE-acetaminophen (PERCOCET) 5-325 mg per tablet 2 Tab, 2 Tab, Oral, Q6H PRN, Vahe Clements MD, 2 Tab at 12/06/20 1204    Signed By: Macey Clements MD     December 6, 2020

## 2020-12-07 NOTE — DISCHARGE INSTRUCTIONS
recs from gastroenterology=  1) For ulcers seen in GI tract; take medication to block stomach acid twice daily for two months  2. Carafate 1g QID for 14 days  3. Repeat endoscopy (EGD) in 2 months  4. Avoid non-essential NSAID's, alcohol, and tobacco products    Regarding Leadless pacemaker - follow up with electrophysiology as needed; Dr Lolly Quintanilla    Regarding R chest wall wound- There was no evidence of infection  She can follow up with the thoracic surgery clinic as needed    RX provided for continued outpatient PT as needed    Resume long acting insulin if blood glucose levels are persistently elevated  Diabetic diet      Critical access hospital Post Hospital/ED Visit Follow-Up Instructions/Information    You may have an in home follow up visit set up with [facility] or may wish to contact [facility] to set-up a visit:    What are we? [facility] is an in-home urgent care service staffed with emergency trained medical teams. We come to your home in a vehicle stocked with medical supplies and technology. An ER physician is always available if needed. When? As a part of your hospital follow-up, an appointment has been/ or can be set up for us to come see you. Usually, this will be 24-72 hours after you leave the hospital or as needed. [facility] is open 7am-9pm, 7 days a week, 365 days a year, including holidays. Why? We know that you cannot always get to your doctor after being in the hospital and that your doctor is not always available when you need them. Once your workup is complete, we'll call in your prescriptions, update your family doctor, and handle billing with your insurance so you can focus on feeling better, faster without leaving home. How much? We accept most major health insurance plans, including Medicaid, Medicare, and Medicare Advantage [address] [facility], [address] [place] [address], [employer], and [employer].  We also accept: credit, debit, health savings account (HSA), health reimbursement account (HRA) and flexible spending account (FSA) payments. [facility]'s prices compare to conventional urgent care facilities, but we bring the care to you. How to reach us? Getting care is easy- use our mobile sukhjinder ([email or web address]), website [email or web address]) or call us [phone].

## 2020-12-07 NOTE — DISCHARGE SUMMARY
Discharge Summary       PATIENT ID: Shirin Ferguson  MRN: [id number]   YOB: 1960    DATE OF ADMISSION: 11/23/2020  3:21 PM    DATE OF DISCHARGE: 12/07/20 13:30  PRIMARY CARE PROVIDER: Austin Villa MD     ATTENDING PHYSICIAN: Aracely Kirby  DISCHARGING PROVIDER: Felicia Miles MD    To contact this individual call [phone] and ask the  to page. If unavailable ask to be transferred the Adult Hospitalist Department. CONSULTATIONS: IP CONSULT TO CARDIOLOGY  IP CONSULT TO HOSPITALIST  IP CONSULT TO THORACIC SURGERY  IP CONSULT TO GASTROENTEROLOGY    PROCEDURES/SURGERIES: Procedure(s):  ESOPHAGOGASTRODUODENOSCOPY (EGD)    ADMITTING DIAGNOSES & HOSPITAL COURSE:   Shirin Ferguson is a 63 y.o. female with a past medical history of paroxysmal atrial fibrillation on warfarin s/p AV node ablation with pacemaker replacement, hypertension, type 2 diabetes mellitus II, stage IV CKD, breast cancer s/p bl mastectomy, L BKA, s/p osteomyelitis complicated by staph bacteremia, anxiety/depression who presents from her PCP office with right-sided chest pain, shortness of breath, and vomiting. Also notes leakage from right femoral artery leadless pacemaker insertion site.       In the ED w/u was significant for elevated SBP to 170, troponin elevated to 0.11, pro-BNP greater than 35,000. Chest x-ray was normal, right arterial duplex was negative for pseudoaneurysm of the right groin. CT chest with interval resection of the medial right clavicle and 3 cm postoperative seroma/hematoma with bibasilar atelectasis, abdominal, and pelvic ascites, and diffuse anasarca.     Patient was hospitalized in 4/2020 for dual lead extraction of PM s/p sepsis from osteomyelitis complicated by MSSA bacteremia. She completed abx therapy, and subsequently underwentA AV node ablation with reimplantation of leadless PM on 11/17 by Dr. Raiza Vega.   This was preceded by hospitalization at [facility] for osteomyelitis that resulted in bacteremia requiring L bka and causing L sternoclavicular joint infection s/p I&D, and old PM lead vegetation s/p laser lead extraction and replacement with leadless pm.    DISCHARGE DIAGNOSES / PLAN:         #Chest Pain- resolved  -2/2 R clavicular fluid collection s/p clavicular resection/surgery  --consulted thoracic surgery (Dr. Benjamín Holbrook)  -Elnoria Cushing in the ED, continue with vancomycin given past wound infection and bacteremia with S. Aureus sensitive to vanc. Infection resolved and no further abx needed- she may follow up with throacic surgery if needed       #Elevated Troponin  No MI, likely related to infection and breathing issues     #Parox A-fib s/p AV node ablation  -stable on current meds, restarted coumadin -     #Normocytic Anemia  -H/H 8.0 on DC     PARAG on CKD stage III   -creatinine improving  - secondary to sepsis  Back to baseline- stopped fluids and restarting bumex in am tomorrow     T2DM- hyperglycemia  -off long-acting insulin this admission  Diet controlled, continue SSI. A1c 6.1, monitor finger stick glucose     HTN   - BP stable on current med regimen     Depression  -stable, continue buspirone, Zoloft     Vaginal yeast infection- starting diflucan     ADDITIONAL CARE RECOMMENDATIONS:   recs from gastroenterology=  1) For ulcers seen in GI tract; take medication to block stomach acid twice daily for two months  2. Carafate 1g QID for 14 days  3. Repeat endoscopy (EGD) in 2 months  4.  Avoid non-essential NSAID's, alcohol, and tobacco products    Regarding Leadless pacemaker - follow up with electrophysiology as needed; Dr Lillie Emanuel    Regarding R chest wall wound- There was no evidence of infection  She can follow up with the thoracic surgery clinic as needed    RX provided for continued outpatient PT as needed     PENDING TEST RESULTS:   At the time of discharge the following test results are still pending: none    FOLLOW UP APPOINTMENTS:    Follow-up Information     Follow up With Specialties Details Why [address] Marisela Osullivan MD Internal Medicine   [phone] [id number]  [phone]           DIET: diabetic    ACTIVITY: Activity as tolerated, cont outpatient PT    WOUND CARE: as instructed by surgery    EQUIPMENT needed: none    DISCHARGE MEDICATIONS:  Current Discharge Medication List      START taking these medications    Details   pantoprazole (PROTONIX) 40 mg tablet Take 1 Tab by mouth Before breakfast and dinner. Qty: 60 Tab, Refills: 1      sucralfate (Carafate) 100 mg/mL suspension Take 10 mL by mouth four (4) times daily for 14 days. Qty: 560 mL, Refills: 0      oxyCODONE-acetaminophen (PERCOCET) 5-325 mg per tablet Take 2 Tabs by mouth every six (6) hours as needed for Pain for up to 3 days. Max Daily Amount: 8 Tabs. Qty: 20 Tab, Refills: 0    Associated Diagnoses: Left below-knee amputee ([address]      ! ! ondansetron (Zofran ODT) 4 mg disintegrating tablet Take 1 Tab by mouth every eight (8) hours as needed for Nausea or Vomiting. Qty: 30 Tab, Refills: 2      OTHER,NON-FORMULARY, Physical Therapy- eval and treat  Dx- weakness from prolonged hospitalization  Qty: 1 Each, Refills: 0       !! - Potential duplicate medications found. Please discuss with provider. CONTINUE these medications which have CHANGED    Details   bumetanide (BUMEX) 1 mg tablet Take 1 Tab by mouth two (2) times a day. Qty: 60 Tab, Refills: 0         CONTINUE these medications which have NOT CHANGED    Details   !! sertraline (ZOLOFT) 100 mg tablet       warfarin (Coumadin) 1 mg tablet 2 mg M-W-FR and 1 all other days      !! ondansetron (Zofran ODT) 4 mg disintegrating tablet Take 1 Tab by mouth every eight (8) hours as needed for Nausea. Qty: 10 Tab, Refills: 0      metoprolol tartrate (LOPRESSOR) 25 mg tablet Take 1 Tab by mouth two (2) times a day. Qty: 60 Tab, Refills: 3      !! sertraline (ZOLOFT) 25 mg tablet Take 25 mg by mouth daily.  Take with 100 mg tablet every morning      insulin glargine (Lantus Solostar U-100 Insulin) 100 unit/mL (3 mL) inpn 10 Units by SubCUTAneous route Daily (before breakfast). hydrALAZINE (APRESOLINE) 100 mg tablet Take 1 Tab by mouth three (3) times daily. Qty: 90 Tab, Refills: 11    Associated Diagnoses: Essential hypertension      busPIRone (BUSPAR) 10 mg tablet TAKE ONE TABLET BY MOUTH TWICE A DAY  Qty: 60 Tab, Refills: 10    Associated Diagnoses: Anxiety as acute reaction to gross stress       !! - Potential duplicate medications found. Please discuss with provider. NOTIFY YOUR PHYSICIAN FOR ANY OF THE FOLLOWING:   Fever over 101 degrees for 24 hours. Chest pain, shortness of breath, fever, chills, nausea, vomiting, diarrhea, change in mentation, falling, weakness, bleeding. Severe pain or pain not relieved by medications. Or, any other signs or symptoms that you may have questions about. DISPOSITION:    Home With:   OT  PT  HH  RN       Long term SNF/Inpatient Rehab   X Independent living    Hospice    Other:       PATIENT CONDITION AT DISCHARGE:     Functional status    Poor     Deconditioned    X Independent      Cognition   X  Lucid     Forgetful     Dementia      Catheters/lines (plus indication)    [place]     PICC     PEG    X None      Code status   X  Full code     DNR      PHYSICAL EXAMINATION AT DISCHARGE:  Physical Exam:   Patient Vitals for the past 24 hrs:   Temp Pulse Resp BP SpO2   12/07/20 1231 97.7 °F (36.5 °C) 91 16 (!) 131/48 97 %   12/07/20 1107     96 %   12/07/20 0845 97.9 °F (36.6 °C) 91 16 (!) 124/50 96 %   12/07/20 0308 98.7 °F (37.1 °C) 90 18 133/66 96 %   12/06/20 2307 98.1 °F (36.7 °C) 87 18 (!) 137/59 98 %   12/06/20 2003 97.9 °F (36.6 °C) 90 18 (!) 130/51 99 %   12/06/20 1717  90  (!) 106/42    12/06/20 1633 97.7 °F (36.5 °C) 92 16 (!) 108/90 98 %     GENERAL: alert, cooperative, no distress, appears stated age  HEENT: normocephalic, atraumatic, EOMI.    LUNG: clear to auscultation bilaterally  HEART: regular rate and rhythm,  no murmur,   ABDOMEN: soft, non-tender.    EXTREMITIES:  L BKA, R groin wound with dressing, and clear drainage  SKIN: rash around upper chest  NEUROLOGIC: no focal neurological deficits  PSYCHIATRIC: depressed mood and flat affect    Recent Results (from the past 24 hour(s))   PROTHROMBIN TIME + INR    Collection Time: 12/07/20  3:09 AM   Result Value Ref Range    INR 1.2 (H) 0.9 - 1.1      Prothrombin time 12.5 (H) 9.0 - 11.1 sec   CBC W/O DIFF    Collection Time: 12/07/20  3:09 AM   Result Value Ref Range    WBC 6.9 3.6 - 11.0 K/uL    RBC 3.50 (L) 3.80 - 5.20 M/uL    HGB 8.0 (L) 11.5 - 16.0 g/dL    HCT 27.6 (L) 35.0 - 47.0 %    MCV 78.9 (L) 80.0 - 99.0 FL    MCH 22.9 (L) 26.0 - 34.0 PG    MCHC 29.0 (L) 30.0 - 36.5 g/dL    RDW 18.0 (H) 11.5 - 14.5 %    PLATELET 229 429 - 658 K/uL    MPV 11.0 8.9 - 12.9 FL    NRBC 0.0 0  WBC    ABSOLUTE NRBC 0.00 0.00 - 8.98 K/uL   METABOLIC PANEL, BASIC    Collection Time: 12/07/20  3:09 AM   Result Value Ref Range    Sodium 134 (L) 136 - 145 mmol/L    Potassium 3.6 3.5 - 5.1 mmol/L    Chloride 99 97 - 108 mmol/L    CO2 27 21 - 32 mmol/L    Anion gap 8 5 - 15 mmol/L    Glucose 103 (H) 65 - 100 mg/dL    BUN 22 (H) 6 - 20 MG/DL    Creatinine 1.96 (H) 0.55 - 1.02 MG/DL    BUN/Creatinine ratio 11 (L) 12 - 20      GFR est AA 31 (L) >60 ml/min/1.73m2    GFR est non-AA 26 (L) >60 ml/min/1.73m2    Calcium 8.6 8.5 - 10.1 MG/DL         CHRONIC MEDICAL DIAGNOSES:  Problem List as of 12/7/2020 Date Reviewed: 11/23/2020          Codes Class Noted - Resolved    S/P atrioventricular rolando ablation ICD-10-CM: Z98.890  ICD-9-CM: V45.89  11/17/2020 - Present        Chest pain ICD-10-CM: R07.9  ICD-9-CM: 786.50  11/23/2020 - Present        Left below-knee amputee [facility]) ICD-10-CM: R49.370  ICD-9-CM: V49.75  6/11/2020 - Present        H/O bilateral mastectomy ICD-10-CM: Z90.13  ICD-9-CM: V45.71  3/26/2020 - Present        Foot deformity, right ICD-10-CM: M21.961  ICD-9-CM: 736.70 9/24/2019 - Present        Pes cavus ICD-10-CM: Q66.70  ICD-9-CM: 736.73  9/24/2019 - Present        Diabetic ulcer of right midfoot associated with type 2 diabetes mellitus, with fat layer exposed ([facility] ICD-10-CM: E11.621, Q90.151  ICD-9-CM: 250.80, 707.14  8/6/2019 - Present        Venous stasis ulcer of right lower leg with edema of right lower leg [facility]) ICD-10-CM: I83.019, I83.891, L97.919, R60.9  ICD-9-CM: 454.8, 454.0  11/14/2018 - Present        Diabetic polyneuropathy associated with type 2 diabetes mellitus ([facility] ICD-10-CM: E11.42  ICD-9-CM: 250.60, 357.2  11/13/2018 - Present        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 Winston Morillo obesity with BMI of 40.0-44.9, adult [facility]) ICD-10-CM: E66.01, Z68.41  ICD-9-CM: 278.01, V85.41  5/1/2017 - Present        Controlled type 2 diabetes mellitus with stage 4 chronic kidney disease, with long-term current use of insulin ([facility] ICD-10-CM: E11.22, N18.4, Z79.4  ICD-9-CM: 250.40, 585.4, V58.67  1/16/2017 - Present        PAF (paroxysmal atrial fibrillation) ([facility] ICD-10-CM: I48.0  ICD-9-CM: 427.31  11/28/2016 - Present        Anemia in stage 4 chronic kidney disease ([facility] ICD-10-CM: N18.4, D63.1  ICD-9-CM: 285.21, 585.4  11/28/2016 - Present        Essential hypertension ICD-10-CM: I10  ICD-9-CM: 401.9  8/26/2016 - Present        Systolic murmur IJG-15-WF: R01.1  ICD-9-CM: 785.2  12/7/2015 - Present        CKD (chronic kidney disease), stage IV ([facility] ICD-10-CM: N18.4  ICD-9-CM: 585.4  6/9/2012 - Present    Overview Addendum 3/12/2013  8:11 AM by Chino Baird.      Acute on chronic, Dr. Ludwin Sarmiento             Mixed hyperlipidemia ICD-10-CM: E78.2  ICD-9-CM: 272.2  5/22/2012 - Present Long term (current) use of anticoagulants ICD-10-CM: Z79.01  ICD-9-CM: V58.61  4/11/2012 - Present        S/P cardiac pacemaker procedure ICD-10-CM: Z95.0  ICD-9-CM: V45.01  4/3/2012 - Present    Overview Addendum 6/12/2020  2:12 PM by Duncan Crane MD     4/2/12 Medtronic dual chamber pacemaker implant  Pacer removed April 2020 due vegetations related to diabetic foot osteomyelitis. Sick sinus syndrome (HCC) (Chronic) ICD-10-CM: I49.5  ICD-9-CM: 427.81  4/2/2012 - Present    Overview Signed 4/2/2012  1:37 PM by Sheldon Pritchett NP     With 5 second and greater pauses, dual chamber pacemaker placement and loop recorder explant done>medtronic device             Status post ablation of atrial fibrillation (Chronic) ICD-10-CM: D36.247, Z86.79  ICD-9-CM: V45.89  12/15/2011 - Present    Overview Signed 12/15/2011  9:23 AM by Monae Riley NP     12/14/11 PVI ablation of atrial fibrillation             Fe deficiency anemia ICD-10-CM: D50.9  ICD-9-CM: 280.9  4/14/2010 - Present    Overview Signed 4/23/2010 12:50 PM by Eric Preston. EGD and Colonoscopy neg latter repeat in 10 years             History of breast cancer ICD-10-CM: Z85.3  ICD-9-CM: V10.3  4/13/2010 - Present    Overview Addendum 6/11/2018  4:11 PM by Duncan Crane MD     1998, right modified radical mastectomy  0/11 nodes, + est and pro receptors. chemotherapy x 4 `1999 2008 left cancer with mastectomy and bilateral reconstruction using own tissues.                 Asthma ICD-10-CM: J45.909  ICD-9-CM: 493.90  4/13/2010 - Present    Overview Signed 4/13/2010  9:20 AM by Eric Preston.     infectious             RESOLVED: Disorder of sternoclavicular joint ICD-10-CM: M25.9  ICD-9-CM: 719.91  8/21/2020 - 9/3/2020        RESOLVED: Sepsis ([address] ICD-10-CM: A41.9  ICD-9-CM: 038.9, 995.91  8/20/2020 - 9/3/2020        RESOLVED: Sepsis ([facility] ICD-10-CM: A41.9  ICD-9-CM: 038.9, 995.91  4/20/2020 - 6/11/2020 RESOLVED: Atrial fibrillation with rapid ventricular response (HCC) ICD-10-CM: I48.91  ICD-9-CM: 427.31  3/8/2020 - 3/26/2020        RESOLVED: Left leg cellulitis (Chronic) ICD-10-CM: L03.116  ICD-9-CM: 682.6  3/8/2020 - 6/11/2020        RESOLVED: Elevated troponin ICD-10-CM: R77.8  ICD-9-CM: 790.6  3/8/2020 - 3/26/2020        RESOLVED: Atrial fibrillation with RVR ([facility] ICD-10-CM: I48.91  ICD-9-CM: 427.31  3/8/2020 - 6/12/2020        RESOLVED: Diabetic ulcer of left midfoot with necrosis of muscle ([facility] ICD-10-CM: E11.621, L97.423  ICD-9-CM: 250.80, 707.14  3/3/2020 - 6/11/2020        RESOLVED: Traumatic hematoma of foot, left, initial encounter ICD-10-CM: S90.32XA  ICD-9-CM: 924.20  2/25/2020 - 6/11/2020        RESOLVED: Sepsis ([facility] ICD-10-CM: A41.9  ICD-9-CM: 038.9, 995.91  1/14/2020 - 1/23/2020        RESOLVED: Cellulitis ICD-10-CM: L03.90  ICD-9-CM: 682.9  12/6/2019 - 12/11/2019        RESOLVED: Type 2 diabetes mellitus with left diabetic foot infection ([facility] ICD-10-CM: E11.628, L08.9  ICD-9-CM: 250.80, 686.9  10/29/2019 - 6/11/2020        RESOLVED: Foot deformity, acquired, left ICD-10-CM: P87.183  ICD-9-CM: 736.70  9/24/2019 - 6/11/2020        RESOLVED: Cellulitis of right lower extremity ICD-10-CM: L03.115  ICD-9-CM: 682.6  8/9/2019 - 3/26/2020        RESOLVED: Sepsis ([facility] ICD-10-CM: A41.9  ICD-9-CM: 038.9, 995.91  8/9/2019 - 8/14/2019        RESOLVED: Cellulitis and abscess of right leg ICD-10-CM: L03.115, L02.415  ICD-9-CM: 682.6  8/9/2019 - 8/14/2019        RESOLVED: Diabetic ulcer of left heel associated with type 2 diabetes mellitus, with fat layer exposed ([facility] (Chronic) ICD-10-CM: P63.391, J21.399  ICD-9-CM: 250.80, 707.14  6/21/2019 - 6/11/2020        RESOLVED: Open breast wound, left, initial encounter ICD-10-CM: S21.002A  ICD-9-CM: 879.0  6/7/2019 - 3/26/2020        RESOLVED: Laceration of right leg excluding thigh, subsequent encounter ICD-10-CM: S81.811D  ICD-9-CM: V58.89, 891.0  12/28/2018 - 12/11/2019        RESOLVED: Bleeding disorder ([facility] ICD-10-CM: D69.9  ICD-9-CM: 287.9  4/13/2010 - 3/30/2017              Greater than 30 minutes were spent with the patient on counseling and coordination of care    Signed:   Kirby Rocha MD  12/7/2020  1:28 PM   .

## 2020-12-07 NOTE — PROGRESS NOTES
0730 Bedside shift change report given to Phi Barrera (oncoming nurse) by Velvet Reynaga RN (offgoing nurse). Report included the following information SBAR, Kardex, Intake/Output, MAR and Recent Results.

## 2020-12-07 NOTE — PROGRESS NOTES
Problem: Risk for Spread of Infection  Goal: Prevent transmission of infectious organism to others  Description: Prevent the transmission of infectious organisms to other patients, staff members, and visitors. Outcome: Resolved/Met     Problem: Patient Education:  Go to Education Activity  Goal: Patient/Family Education  Outcome: Resolved/Met     Problem: Falls - Risk of  Goal: *Absence of Falls  Description: Document Rayen Fall Risk and appropriate interventions in the flowsheet. Outcome: Resolved/Met     Problem: Patient Education: Go to Patient Education Activity  Goal: Patient/Family Education  Outcome: Resolved/Met     Problem: Discharge Planning  Goal: *Discharge to safe environment  Outcome: Resolved/Met     Problem: Pressure Injury - Risk of  Goal: *Prevention of pressure injury  Description: Document Valdemar Scale and appropriate interventions in the flowsheet.   Outcome: Resolved/Met     Problem: Patient Education: Go to Patient Education Activity  Goal: Patient/Family Education  Outcome: Resolved/Met     Problem: Unstable angina/NSTEMI: Day of Admission/Day 1  Goal: Activity/Safety  Outcome: Resolved/Met  Goal: Consults, if ordered  Outcome: Resolved/Met  Goal: Diagnostic Test/Procedures  Outcome: Resolved/Met  Goal: Nutrition/Diet  Outcome: Resolved/Met  Goal: Discharge Planning  Outcome: Resolved/Met  Goal: Medications  Outcome: Resolved/Met  Goal: Treatments/Interventions/Procedures  Outcome: Resolved/Met  Goal: Psychosocial  Outcome: Resolved/Met  Goal: *Hemodynamically stable  Outcome: Resolved/Met  Goal: *Optimal pain control at patient's stated goal  Outcome: Resolved/Met  Goal: *Lungs clear or at baseline  Outcome: Resolved/Met     Problem: Unstable Angina/NSTEMI: Discharge Outcomes  Goal: *Hemodynamically stable  Outcome: Resolved/Met  Goal: *Stable cardiac rhythm  Outcome: Resolved/Met  Goal: *Lungs clear or at baseline  Outcome: Resolved/Met  Goal: *Optimal pain control at patient's stated goal  Outcome: Resolved/Met  Goal: *Identifies cardiac risk factors  Outcome: Resolved/Met  Goal: *Verbalizes home exercise program, activity guidelines, cardiac precautions  Outcome: Resolved/Met  Goal: *Verbalizes understanding and describes prescribed diet  Outcome: Resolved/Met  Goal: *Verbalizes name, dosage, time, side effects, and number of days to continue medications  Outcome: Resolved/Met  Goal: *Anxiety reduced or absent  Outcome: Resolved/Met  Goal: *Understands and describes signs and symptoms to report to providers(Stroke Metric)  Outcome: Resolved/Met  Goal: *Describes follow-up/return visits to physicians  Outcome: Resolved/Met  Goal: *Describes available resources and support systems  Outcome: Resolved/Met  Goal: *Influenza immunization  Outcome: Resolved/Met  Goal: *Pneumococcal immunization  Outcome: Resolved/Met  Goal: *Describes smoking cessation resources  Outcome: Resolved/Met     Problem: Patient Education: Go to Patient Education Activity  Goal: Patient/Family Education  Outcome: Resolved/Met     Problem: Patient Education: Go to Patient Education Activity  Goal: Patient/Family Education  Outcome: Resolved/Met     Problem: Heart Failure: Day 1  Goal: Off Pathway (Use only if patient is Off Pathway)  Outcome: Resolved/Met  Goal: Activity/Safety  Outcome: Resolved/Met  Goal: Consults, if ordered  Outcome: Resolved/Met  Goal: Diagnostic Test/Procedures  Outcome: Resolved/Met  Goal: Nutrition/Diet  Outcome: Resolved/Met  Goal: Discharge Planning  Outcome: Resolved/Met  Goal: Medications  Outcome: Resolved/Met  Goal: Respiratory  Outcome: Resolved/Met  Goal: Treatments/Interventions/Procedures  Outcome: Resolved/Met  Goal: Psychosocial  Outcome: Resolved/Met  Goal: *Oxygen saturation within defined limits  Outcome: Resolved/Met  Goal: *Hemodynamically stable  Outcome: Resolved/Met  Goal: *Optimal pain control at patient's stated goal  Outcome: Resolved/Met  Goal: *Anxiety reduced or absent  Outcome: Resolved/Met     Problem: Heart Failure: Day 2  Goal: Off Pathway (Use only if patient is Off Pathway)  Outcome: Resolved/Met  Goal: Activity/Safety  Outcome: Resolved/Met  Goal: Consults, if ordered  Outcome: Resolved/Met  Goal: Diagnostic Test/Procedures  Outcome: Resolved/Met  Goal: Nutrition/Diet  Outcome: Resolved/Met  Goal: Discharge Planning  Outcome: Resolved/Met  Goal: Medications  Outcome: Resolved/Met  Goal: Respiratory  Outcome: Resolved/Met  Goal: Treatments/Interventions/Procedures  Outcome: Resolved/Met  Goal: Psychosocial  Outcome: Resolved/Met  Goal: *Oxygen saturation within defined limits  Outcome: Resolved/Met  Goal: *Hemodynamically stable  Outcome: Resolved/Met  Goal: *Optimal pain control at patient's stated goal  Outcome: Resolved/Met  Goal: *Anxiety reduced or absent  Outcome: Resolved/Met  Goal: *Demonstrates progressive activity  Outcome: Resolved/Met     Problem: Heart Failure: Day 3  Goal: Off Pathway (Use only if patient is Off Pathway)  Outcome: Resolved/Met  Goal: Activity/Safety  Outcome: Resolved/Met  Goal: Diagnostic Test/Procedures  Outcome: Resolved/Met  Goal: Nutrition/Diet  Outcome: Resolved/Met  Goal: Discharge Planning  Outcome: Resolved/Met  Goal: Medications  Outcome: Resolved/Met  Goal: Respiratory  Outcome: Resolved/Met  Goal: Treatments/Interventions/Procedures  Outcome: Resolved/Met  Goal: Psychosocial  Outcome: Resolved/Met  Goal: *Oxygen saturation within defined limits  Outcome: Resolved/Met  Goal: *Hemodynamically stable  Outcome: Resolved/Met  Goal: *Optimal pain control at patient's stated goal  Outcome: Resolved/Met  Goal: *Anxiety reduced or absent  Outcome: Resolved/Met  Goal: *Demonstrates progressive activity  Outcome: Resolved/Met     Problem: Heart Failure: Day 4  Goal: Off Pathway (Use only if patient is Off Pathway)  Outcome: Resolved/Met  Goal: Activity/Safety  Outcome: Resolved/Met  Goal: Diagnostic Test/Procedures  Outcome: Resolved/Met  Goal: Nutrition/Diet  Outcome: Resolved/Met  Goal: Discharge Planning  Outcome: Resolved/Met  Goal: Medications  Outcome: Resolved/Met  Goal: Respiratory  Outcome: Resolved/Met  Goal: Treatments/Interventions/Procedures  Outcome: Resolved/Met  Goal: Psychosocial  Outcome: Resolved/Met  Goal: *Oxygen saturation within defined limits  Outcome: Resolved/Met  Goal: *Hemodynamically stable  Outcome: Resolved/Met  Goal: *Optimal pain control at patient's stated goal  Outcome: Resolved/Met  Goal: *Anxiety reduced or absent  Outcome: Resolved/Met  Goal: *Demonstrates progressive activity  Outcome: Resolved/Met     Problem: Heart Failure: Day 5  Goal: Off Pathway (Use only if patient is Off Pathway)  Outcome: Resolved/Met  Goal: Activity/Safety  Outcome: Resolved/Met  Goal: Diagnostic Test/Procedures  Outcome: Resolved/Met  Goal: Nutrition/Diet  Outcome: Resolved/Met  Goal: Discharge Planning  Outcome: Resolved/Met  Goal: Medications  Outcome: Resolved/Met  Goal: Respiratory  Outcome: Resolved/Met  Goal: Treatments/Interventions/Procedures  Outcome: Resolved/Met  Goal: Psychosocial  Outcome: Resolved/Met     Problem: Heart Failure: Discharge Outcomes  Goal: *Demonstrates ability to perform prescribed activity without shortness of breath or discomfort  Outcome: Resolved/Met  Goal: *Left ventricular function assessment completed prior to or during stay, or planned for post-discharge  Outcome: Resolved/Met  Goal: *ACEI prescribed if LVEF less than 40% and no contraindications or ARB prescribed  Outcome: Resolved/Met  Goal: *Verbalizes understanding and describes prescribed diet  Outcome: Resolved/Met  Goal: *Verbalizes understanding/describes prescribed medications  Outcome: Resolved/Met  Goal: *Describes available resources and support systems  Description: (eg: Home Health, Palliative Care, Advanced Medical Directive)  Outcome: Resolved/Met  Goal: *Describes smoking cessation resources  Outcome: Resolved/Met  Goal: *Understands and describes signs and symptoms to report to providers(Stroke Metric)  Outcome: Resolved/Met  Goal: *Describes/verbalizes understanding of follow-up/return appt  Description: (eg: to physicians, diabetes treatment coordinator, and other resources  Outcome: Resolved/Met  Goal: *Describes importance of continuing daily weights and changes to report to physician  Outcome: Resolved/Met

## 2020-12-07 NOTE — PROGRESS NOTES
Problem: Risk for Spread of Infection  Goal: Prevent transmission of infectious organism to others  Description: Prevent the transmission of infectious organisms to other patients, staff members, and visitors. Outcome: Progressing Towards Goal     Problem: Falls - Risk of  Goal: *Absence of Falls  Description: Document Keya Drake Fall Risk and appropriate interventions in the flowsheet. Outcome: Progressing Towards Goal  Note: Fall Risk Interventions:  Mobility Interventions: Patient to call before getting OOB, Communicate number of staff needed for ambulation/transfer         Medication Interventions: Evaluate medications/consider consulting pharmacy, Teach patient to arise slowly, Patient to call before getting OOB    Elimination Interventions: Patient to call for help with toileting needs              Problem: Pressure Injury - Risk of  Goal: *Prevention of pressure injury  Description: Document Valdemar Scale and appropriate interventions in the flowsheet.   Outcome: Progressing Towards Goal  Note: Pressure Injury Interventions:  Sensory Interventions: Minimize linen layers    Moisture Interventions: Absorbent underpads, Minimize layers    Activity Interventions: Increase time out of bed, Pressure redistribution bed/mattress(bed type)    Mobility Interventions: Pressure redistribution bed/mattress (bed type)    Nutrition Interventions: Document food/fluid/supplement intake    Friction and Shear Interventions: Minimize layers

## 2020-12-07 NOTE — ROUTINE PROCESS
Attempted to schedule TEE PCP appt with Dr. Ciro Sharma, the provider is booked until the patients next appt on Jan 6 @ 1PM.    [employer] does not service the patients home address.

## 2020-12-07 NOTE — PROGRESS NOTES
12/7/2020 -   TEE:  - RUR: 36%  - Disposition is TBD dependent on progression: possible need for [facility] services  - Transport to be provided by spouse    - Labs are being  Monitored  - INR has remained subtherapeutic  - Patient is S/P EGD 12/4  - PT and OT pending for today, 12/7  CR[facility], [facility]; Z: [phone]    15:10 -   CM notes that patient continued to decline multiple services and has been cleared for discharge today, 12/7. Disposition is for discharge to own home with transport via spouse.   CM placed [address] on AVS.  CRM: Isaac Rowland, MPH, [address] [place] [address]; Z: [phone]

## 2020-12-07 NOTE — PROGRESS NOTES
Bedside and Verbal shift change report given to Mildred [facility] (oncoming nurse) by Qamar Monterroso RN (offgoing nurse). Report included the following information SBAR, Kardex, MAR, Cardiac Rhythm Paced and Dual Neuro Assessment.

## 2020-12-07 NOTE — PROGRESS NOTES
Bedside shift change report given to Elda Loya RN (oncoming nurse) by Shea Reid RN (offgoing nurse). Report included the following information SBAR, Kardex, [facility] and Cardiac Rhythm Paced.

## 2020-12-07 NOTE — PROGRESS NOTES
Chart checked, pt cleared by nursing. attempted to work with pt but she politely and adamantly declined stating that she just did not feel up to it. She reports that she continues to amb to the bathroom with nursing assist (using walker and her prosthesis). Plan is to follow and see as able and appropriate.  Thank you, Za Otero, PT

## 2020-12-07 NOTE — PROGRESS NOTES
Pharmacist Note - Warfarin Dosing  Consult provided for this 63 y. o.female to manage warfarin for Atrial Fibrillation    INR Goal: 2 - 3    Home regimen: 2 mg M-W-FR and 1 mg all other days    Drugs that may increase INR: None  Drugs that may decrease INR: None  Other current anticoagulants/ drugs that may increase bleeding risk: None  Risk factors: None  Daily INR ordered: YES    Recent Labs     12/07/20  0309 12/05/20  0353   HGB 8.0*  --    INR 1.2* 1.3*     Date               INR                  Dose  11/23               1.9                   Held  11/24                --                     Held  11/25                --                     2 mg   11/26              1.5                    1 mg   11/27              1.3                    2 mg  11/28              1.4                    2 mg  11/29              1.5                    2.5 mg  11/30              1.6                    2.5 mg  12/1                1.8                    2 mg  12/2                 2                      2 mg   12/3  Warfarin discontinued for procedure   12/4                1.3                    3 mg  12/5                1.3                    3 mg  12/6                Refused            2 mg  12/7                1.2                    3 mg                                                                               Assessment/ Plan: Will order warfarin 3 mg PO x 1 dose today. Pharmacy will continue to monitor daily and adjust therapy as indicated. Please contact the pharmacist at  for outpatient recommendations if needed.

## 2020-12-08 NOTE — PROGRESS NOTES
Patient was admitted to [address] [place] [address] on 2020 and discharged on 2020 for chest pain. Outreach made within 2 business days of discharge: Yes    Top Discharge Challenges to be reviewed by the provider   Additional needs identified to be addressed with provider readmission  medications, OP PT  Discussed COVID-19 related testing which was available at this time. Test results were negative. Patient informed of results, if available? no   Method of communication with provider : chart routing       Advance Care Planning:   Does patient have an Advance Directive:  yes; reviewed and current     Inpatient Readmission Risk score:   Was this a readmission? yes   Patient stated reason for the admission: chest pain  Patients top risk factors for readmission: level of motivation and medical condition  Interventions to address risk factors: schedule and attend follow up appointments, participate with PT, monitor for S&S of infection, follow DM diet and monitor BS    Care Transition Nurse (CTN) contacted the patient by telephone to perform post hospital discharge assessment. Verified name and  with patient as identifiers. Provided introduction to self, and explanation of the CTN role. CTN reviewed discharge instructions, medical action plan and red flags with patient who verbalized understanding. Patient given an opportunity to ask questions and does not have any further questions or concerns at this time. The patient agrees to contact the PCP office for questions related to their healthcare. Medication reconciliation was performed with patient, who verbalizes understanding of administration of home medications. Advised obtaining a 90-day supply of all daily and as-needed medications.    Referral to Pharm D needed: no     Home Health/Outpatient orders at discharge: [facility]: n/a  Date of initial visit: TBD with [facility] ordered at discharge: none  [facility]: n/a  Durable Medical Equipment received: n/a    Covid Risk Education    Patient has following risk factors of: diabetes and chronic kidney disease. Education provided regarding infection prevention, and signs and symptoms of COVID-19 and when to seek medical attention with patient who verbalized understanding. Discussed exposure protocols and quarantine From CDC: Are you at higher risk for severe illness?  and given an opportunity for questions and concerns. The patient agrees to contact the COVID-19 hotline [phone] or PCP office for questions related to COVID-19. For more information on steps you can take to protect yourself, see CDC's How to Protect Yourself     Patient/family/caregiver given information for [employer] and agrees to enroll n/a  Patient's preferred e-mail: n/a  Patient's preferred phone number: n/a    Discussed follow-up appointments. If no appointment was previously scheduled, appointment scheduling offered: [facility] follow up appointment(s):   Future Appointments   Date Time Provider Gómez Lechuga   1/6/2021  1:00 PM Nicholas Rodrigues MD BS[facility]A BS AMB   2/24/2021  2:00 PM PACEMAKER, STFRANCES CAVSF BS AMB   2/24/2021  2:20 PM Shams, Homer Canavan, MD CAV[facility] BS [facility]     Non-[facility] follow up appointment(s): cardio, GI, [facility] TBD  Plan for follow-up call in 7-10 days based on severity of symptoms and risk factors. CTN provided contact information for future needs. Goals Addressed                 This Visit's Progress     Prevent complications post hospitalization. 12/8/2020 Patient reports fatigue, taking medications, some nausea but controled with Zofran, and pain controlled with Percocet. Denies chest pain, SOB, fever, D/V, drainage redness, pus from surgical sites. Patient will schedule appointments with cardio, GI, and [facility], monitor S&S of infection, report details at next week outreach.  RAFAEL

## 2020-12-20 PROBLEM — Z79.01 ANTICOAGULATED: Status: ACTIVE | Noted: 2020-01-01

## 2020-12-20 PROBLEM — R06.02 SOB (SHORTNESS OF BREATH): Status: ACTIVE | Noted: 2020-01-01

## 2020-12-20 PROBLEM — E87.70 FLUID OVERLOAD: Status: ACTIVE | Noted: 2020-01-01

## 2020-12-20 PROBLEM — N18.9 CKD (CHRONIC KIDNEY DISEASE): Status: ACTIVE | Noted: 2020-01-01

## 2020-12-20 PROBLEM — J96.00 ACUTE RESPIRATORY FAILURE (HCC): Status: ACTIVE | Noted: 2020-01-01

## 2020-12-20 PROBLEM — N39.0 UTI (URINARY TRACT INFECTION): Status: ACTIVE | Noted: 2020-01-01

## 2020-12-20 PROBLEM — R79.1 SUBTHERAPEUTIC INTERNATIONAL NORMALIZED RATIO (INR): Status: ACTIVE | Noted: 2020-01-01

## 2020-12-20 PROBLEM — N17.9 AKI (ACUTE KIDNEY INJURY) (HCC): Status: ACTIVE | Noted: 2020-01-01

## 2020-12-20 PROBLEM — Z20.822 SUSPECTED COVID-19 VIRUS INFECTION: Status: ACTIVE | Noted: 2020-01-01

## 2020-12-20 PROBLEM — J18.9 PNEUMONIA: Status: ACTIVE | Noted: 2020-01-01

## 2020-12-20 NOTE — CONSULTS
NEPHROLOGY CONSULT NOTE Patient: Bhanu Freeman MRN: [id number]  PCP: Joselito Gibson MD  
:     1960  Age:   63 y.o. Sex:  female Referring physician: Gabi Hurtado MD 
Reason for consultation: 63 y.o. female with Acute respiratory failure ([facility] [J96.00] SOB (shortness of breath) [R06.02] Fluid overload [E87.70] Suspected COVID-19 virus infection [Z20.828] Pneumonia [J18.9] UTI (urinary tract infection) [N39.0] PARAG (acute kidney injury) ([facility] [N17.9] CKD (chronic kidney disease) [N18.9] Subtherapeutic international normalized ratio (INR) [R79.1] Anticoagulated [Y79.65] complicated by PARAG Admission Date: 2020  5:06 PM  LOS: 0 days ASSESSMENT and PLAN :  
PARAG on CKD: 
- progression of underlying CKD vs CRS vs obstruction 
- repeat labs today 
- renal U/S once COVID-19 ruled out 
- cont oral diuretics 
- daily labs CKD IIIb: 
- baseline Cr 1.8 to 2 
- likely DM and HTN 
 
HTN: 
- BP stable 
- cont home BP meds COVID-19 PUI Hypoxia: 
- improving Hypervolemia: 
- oral diuretics as above UTI: 
- on ceftriaxone - cx pending DM2 PAF: 
- on coumadin PAD s/p L BKA Active Problems / Assessment AAActive  : Active Problems: 
  Acute respiratory failure ([facility] (2020) UTI (urinary tract infection) (2020) Pneumonia (2020) SOB (shortness of breath) (2020) CKD (chronic kidney disease) (2020) Anticoagulated (2020) PARAG (acute kidney injury) ([facility] (2020) Fluid overload (2020) Subtherapeutic international normalized ratio (INR) (2020) Suspected COVID-19 virus infection (2020) Subjective: HPI: Odette Abrams is a 63 y.o. female who has been admitted to the hospital for SOB early this AM.  She has a hx of CKD IIIb, baseline Cr around 1.8 to 2, hx of paf, DM2, HTN, pacer, breast ca s/p b/l mastectomies, PAD s/p LBKA, hx of MSSA bacteremia in the past.  She is a COVID-19 PUI, so she was not examined in the room. She was given abx for possible PNA>  CXR not suggestive of HF and CT scan showed small L pleural effusion. Her Cr was 2.5, up from her baseline. No labs this AM. Given her SOB, she was given lasxi 60mg IV x 1 in the ED. Her was 1.3L since this was given. Per RN, feeling better. Oxygenation improving. Less SOB.  
  
 
Past Medical Hx:  
Past Medical History:  
Diagnosis Date  Acquired lymphedema Right arm  Arrhythmia  Asthma  Atrial fibrillation ([address] Dr. Xavi Manzano and [facility]  Cancer ([address] bilateral breast  
 Chronic kidney disease  Diabetes mellitus type II   
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 ([address] 3/3/2020  Dizziness  Essential hypertension  Hyperlipidemia  Hypertension  Long term (current) use of anticoagulants  Morbid obesity ([address] 3/14/2012  Nausea & vomiting  Neuropathy  Osteoarthritis  Pacemaker  Sick sinus syndrome ([address]  Type 2 diabetes mellitus with left diabetic foot infection ([address] 10/29/2019 Past Surgical Hx: 
  
Past Surgical History:  
Procedure Laterality Date  ABDOMEN SURGERY PROC UNLISTED    
 gastric bypass  GASTRIC BYPASS,OBESE<150CM SAW-EN-Y  7/08  
 hutcher  HX AFIB ABLATION    
 HX AMPUTATION FOOT Left 04/2020  HX BREAST RECONSTRUCTION Bilateral   
 HX CARPAL TUNNEL RELEASE [address] [facility] [address] RIGHT MODIFIED RADICAL  HX MASTECTOMY Left   
 no lymphnode removal  
 HX MOHS PROCEDURES  1995  
 right  HX ORTHOPAEDIC Right   
 knee surgery  HX PACEMAKER  11/17/2020 Dr. Zheng Burns. Insertion of permanent pacemaker. AV node ablation  HX PACEMAKER    
 IR INSERT TUNL CVC W PORT OVER 5 YEARS    
 IR INSERT TUNL CVC W/O PORT OVER 5 YR  5/4/2020  IR INSERT TUNL CVC W/O PORT OVER 5 YR  9/8/2020  IR REMOVE TUNL CVAD W/O PORT / PUMP  6/26/2020  IR REMOVE TUNL CVAD W/O PORT / PUMP  10/19/2020  [address]  8.21.07  
 MA [address] AND 1994  MA ICAR CATHETER ABLATION ATRIOVENTR NODE FUNCTION N/A 11/17/2020 ABLATION [facility] NODE [facility] [address] [address] [facility]  MA RELOCATION OF SKIN POCKET FOR PACEMAKER N/A 4/24/2020 RELOCATE 2 [facility] performed by Sai Cohen MD at [address] CATH LAB  MA RMVL TRANSVNS PM ELTRD 1 LEAD SYS ATR/VENTR N/A 4/24/2020 Remove Pacemaker Dual Leads performed by Sai Cohen MD at [facility] CATH LAB  MA RMVL TRANSVNS PM ELTRD 1 LEAD SYS ATR/VENTR N/A 4/27/2020 REMOVE PACEMAKER DUAL LEADS performed by Sai Cohen MD at [address] CATH LAB  MA TCAT INSJ/RPL PERM LEADLESS PACEMAKER RV W/IMG N/A 11/17/2020 INSERT OR REPLACE TRANSCATH PPM LEADLESS [facility] [address] C[facility]  MA TRABECULOPLASTY BY LASER SURGERY    
 US GUIDE ASP OVARIAN CYST ABD APPROACH  8.21.07  
 RIGHT Medications: 
Prior to Admission medications Medication Sig Start Date End Date Taking? Authorizing Provider  
bumetanide (BUMEX) 1 mg tablet Take 1 Tab by mouth two (2) times a day. 12/7/20   Epi Canales MD  
pantoprazole (PROTONIX) 40 mg tablet Take 1 Tab by mouth Before breakfast and dinner.  12/7/20   Epi Canales MD  
 sucralfate (Carafate) 100 mg/mL suspension Take 10 mL by mouth four (4) times daily for 14 days. 12/7/20 12/21/20  Nikki Juarez MD  
ondansetron (Zofran ODT) 4 mg disintegrating tablet Take 1 Tab by mouth every eight (8) hours as needed for Nausea or Vomiting. 12/7/20   Nikki Juarez MD  
OTHER,NON-FORMULARY, Physical Therapy- eval and treat Dx- weakness from prolonged hospitalization 12/7/20   Nikki Juarez MD  
sertraline (ZOLOFT) 100 mg tablet  10/30/20   Provider, Historical  
warfarin (Coumadin) 1 mg tablet 2 mg M-W-FR and 1 all other days    Provider, Historical  
ondansetron (Zofran ODT) 4 mg disintegrating tablet Take 1 Tab by mouth every eight (8) hours as needed for Nausea. 11/5/20   Brady ARIAS MD  
metoprolol tartrate (LOPRESSOR) 25 mg tablet Take 1 Tab by mouth two (2) times a day. 10/13/20   Tamar Stone ANP  
sertraline (ZOLOFT) 25 mg tablet Take 25 mg by mouth daily. Take with 100 mg tablet every morning 10/6/20   Provider, Historical  
hydrALAZINE (APRESOLINE) 100 mg tablet Take 1 Tab by mouth three (3) times daily. 6/12/20   Stas Leigh MD  
busPIRone (BUSPAR) 10 mg tablet TAKE ONE TABLET BY MOUTH TWICE A DAY 5/24/19   Stas Leigh MD  
 
 
Allergies Allergen Reactions  Avapro [Irbesartan] Unable to Obtain  Bactrim [Sulfamethoxazole-Trimethoprim] Other (comments) Sore mouth  Contrast Agent [Iodine] Itching [address]  Morphine Nausea and Vomiting and Swelling  Penicillins Hives Did not tolerate cefepime 3/2020  Pravachol [Pravastatin] Nausea Only  Seafood [Shellfish Containing Products] Hives  Singulair [Montelukast] Other (comments)  
  palpitations  Ace Inhibitors Cough  Amlodipine Swelling  Captopril Cough  Carvedilol Diarrhea Social Hx:  reports that she has never smoked. She has never used smokeless tobacco. She reports previous alcohol use. She reports that she does not use drugs. Family History Problem Relation Age of Onset  Cancer Mother  Heart Disease Mother  Alcohol abuse Father  Diabetes Brother  Hypertension Brother Review of Systems: A twelve point review of system was performed today. Pertinent positives and negatives are mentioned in the HPI. The reminder of the ROS is negative and noncontributory. Objective:   
Vitals:   
Vitals:  
 12/20/20 0800 12/20/20 0900 12/20/20 1000 12/20/20 1100 BP: (!) 172/92 (!) 147/81 (!) 162/84 (!) 163/81 Pulse: 91 91 91 91 Resp: 20 25 (!) 36 19 Temp: 97.2 °F (36.2 °C) SpO2: 98% 99% 98% 98% Weight:      
Height:      
 
I&O's:  12/19 0701 - 12/20 0700 In: 48 [I.V.:50] Out: 300 [Urine:300] Visit Vitals BP (!) 163/81 Pulse 91 Temp 97.2 °F (36.2 °C) Resp 19 Ht 5' 10\" (1.778 m) Wt 110.7 kg (244 lb) SpO2 98% BMI 35.01 kg/m² Physical Exam:  Not examined in the room due to COVID-19 PUI: 
 
General:stable per RN Lungs : stable oxygenation CVS: RRR on monitor, no LE edema reported EXT: L BKA Neurologic: non focal 
: purewick in place Laboratory Results: 
 
Lab Results Component Value Date BUN 28 (H) 12/19/2020  12/19/2020  
 K 4.8 12/19/2020  12/19/2020 CO2 23 12/19/2020 Lab Results Component Value Date BUN 28 (H) 12/19/2020 BUN 22 (H) 12/07/2020 BUN 18 12/03/2020 BUN 18 12/02/2020 BUN 18 11/30/2020  
 K 4.8 12/19/2020  
 K 3.6 12/07/2020  
 K 3.4 (L) 12/03/2020  
 K 2.9 (L) 12/02/2020  
 K 3.4 (L) 11/30/2020 Lab Results Component Value Date WBC 5.7 12/20/2020 RBC 3.99 12/20/2020 HGB 9.0 (L) 12/20/2020 HCT 30.9 (L) 12/20/2020 MCV 77.4 (L) 12/20/2020 MCH 22.6 (L) 12/20/2020 RDW 19.5 (H) 12/20/2020  (H) 12/20/2020 Lab Results Component Value Date PHOS 3.9 08/21/2020 Urine dipstick:  
Lab Results Component Value Date/Time  Color YELLOW/STRAW 12/19/2020 04:48 PM  
 Appearance CLOUDY (A) 12/19/2020 04:48 PM  
 Specific gravity 1.013 12/19/2020 04:48 PM  
 pH (UA) 5.5 12/19/2020 04:48 PM  
 Protein 100 (A) 12/19/2020 04:48 PM  
 Glucose Negative 12/19/2020 04:48 PM  
 Ketone Negative 12/19/2020 04:48 PM  
 Bilirubin Negative 12/19/2020 04:48 PM  
 Urobilinogen 1.0 12/19/2020 04:48 PM  
 Nitrites Positive (A) 12/19/2020 04:48 PM  
 Leukocyte Esterase TRACE (A) 12/19/2020 04:48 PM  
 Epithelial cells FEW 12/19/2020 04:48 PM  
 Bacteria 4+ (A) 12/19/2020 04:48 PM  
 WBC 5-10 12/19/2020 04:48 PM  
 RBC 0-5 12/19/2020 04:48 PM  
 
 
 
  
 
 
 
Hakeem Quintero MD 
12/20/2020 [facility]  
[address] [address] [address], [address] [facility] Phone - [phone] Fax - [phone] 
[email or web address]

## 2020-12-20 NOTE — ED NOTES
Bedside shift change report given to LEISA RN (oncoming nurse) by Stella Aj RN (offgoing nurse). Report included the following information SBAR, Kardex, ED Summary, Intake/Output, MAR and Recent Results.

## 2020-12-20 NOTE — ED NOTES
Bedside and Verbal shift change report given to GURINDER Gutierrez (oncoming nurse) by Odette Abrams (offgoing nurse). Report included the following information SBAR, ED Summary, Intake/Output, MAR, Recent Results, Med Rec Status and Cardiac Rhythm Paced.

## 2020-12-20 NOTE — PROGRESS NOTES
Pharmacy Daily Dosing of Warfarin Indication: Afib Goal INR: 2-3 PTA Warfarin Dose: 2 mg on MWF and 1 mg TTSS Concurrent anticoagulants: Heparin gtt Concurrent antiplatelet: None Major Interacting Medications Drugs that may increase INR: Azithromycin Drugs that may decrease INR: None Conditions that may increase/decrease INR (CHF, C. diff, cirrhosis, thyroid disorder, hypoalbuminemia): None Labs: 
Recent Labs  
  12/20/20 
1310 12/20/20 
5418 12/20/20 
0114 12/19/20 
2231 12/19/20 
1648 INR  --  1.6*  --  1.6*  --   
HGB 8.8*  --  9.0*  --  8.7* *  --  442*  --  524* TBILI 1.0  --   --   --  1.3* ALB 3.0*  --   --   --  3.4* Impression/Plan:  
Will order warfarin 3 mg PO x 1 dose. Daily INR 
CBC w/o diff QOD Pharmacy will follow daily and adjust the dose as appropriate. Thanks RICO WaiteD 
 
 
[email or web address]

## 2020-12-20 NOTE — PROGRESS NOTES
Pt seen and admitted today by my colleague  COVID test pending , denies any chest pain , On heparin gtts for subtherapeutic INR . Herminia Benedict Pt seen by Nephrology for Cr of 2.55 renal ultrasound to be done , . Consult to Cardiology pending for Acute on chronic CHF with BNP >35,000. Negative Trop . Non Billable hr 25 min

## 2020-12-20 NOTE — ACP (ADVANCE CARE PLANNING)
[address] [facility] Adult  Hospitalist Group Advance Care Planning Note Name: Lam Morgan YOB: 1960 MRN: [id number] Admission Date: 12/19/2020  5:06 PM 
 
Date of discussion: 12/20/2020 Active Diagnoses: 
 
Hospital Problems  Date Reviewed: 11/23/2020 Codes Class Noted POA Acute respiratory failure ([facility] ICD-10-CM: J96.00 
ICD-9-CM: 518.81  12/20/2020 Unknown UTI (urinary tract infection) ICD-10-CM: N39.0 ICD-9-CM: 599.0  12/20/2020 Unknown Pneumonia ICD-10-CM: J18.9 ICD-9-CM: 597  12/20/2020 Unknown  
   
 SOB (shortness of breath) ICD-10-CM: R06.02 
ICD-9-CM: 786.05  12/20/2020 Unknown  
   
 CKD (chronic kidney disease) ICD-10-CM: N18.9 ICD-9-CM: 585.9  12/20/2020 Unknown Anticoagulated ICD-10-CM: Z79.01 
ICD-9-CM: V58.61  12/20/2020 Unknown PARAG (acute kidney injury) ([facility] ICD-10-CM: N17.9 ICD-9-CM: 584.9  12/20/2020 Unknown Fluid overload ICD-10-CM: E87.70 ICD-9-CM: 276.69  12/20/2020 Unknown Subtherapeutic international normalized ratio (INR) ICD-10-CM: R79.1 ICD-9-CM: 790.92  12/20/2020 Unknown Suspected COVID-19 virus infection ICD-10-CM: Z20.828 ICD-9-CM: V01.79  12/20/2020 Unknown These active diagnoses are of sufficient risk that focused discussion on advance care planning is indicated in order to allow the patient to thoughtfully consider personal goals of care, and if situations arise that prevent the ability to personally give input, to ensure appropriate representation of their personal desires for different levels and aggressiveness of care. Discussion:  
 
Persons present and participating in discussion: Pamela Santos MD, Topics Discussed: 
Patient's medical condition and diagnosis: Margarita  ] yes [  ] no  
Surrogate decision maker: [ X ] yes [  ] no  
 Patient's current physical function/cognitive function/frailty: Scar.Lieu  ] yes [  ] no Code Status: [  X] yes [  ] no Non-Invasive Ventilation : [ X ] yes [  ] no Overview of Discussion: I discussed with patient what CODE STATUS means, and how her current admission and comorbidities affect outcomes. CODE STATUS addressed and want to be  FULL CODE Patient would like to assign Adonis Winn Spouse [phone]  
 
is a surrogate decision maker. Time Spent:  
 
Total time spent face-to-face in education and discussion: 16  minutes.   
 
Everton Helm MD 
Date of Service:  12/20/2020 
12:55 AM

## 2020-12-20 NOTE — ED NOTES
Bedside shift change report given to [address] [place] [address] [address] (oncoming nurse) by Jason Urena RN (offgoing nurse). Report included the following information SBAR, Kardex, ED Summary, Intake/Output, MAR and Recent Results.

## 2020-12-20 NOTE — H&P
Hospitalist Admission Note NAME: Juliocesar Juarez :  1960 MRN:  [id number] Date/Time:  2020 12:14 AM 
 
Patient PCP: Viviana Kent MD 
_____________________________________________________________________ Given the patient's current clinical presentation, I have a high level of concern for decompensation if discharged from the emergency department. Complex decision making was performed, which includes reviewing the patient's available past medical records, laboratory results, and x-ray films. My assessment of this patient's clinical condition and my plan of care is as follows. Assessment / Plan: 
 
SOB /CHOW R/o cvid Possible CAP Fluid overload 2/2 CHF/Renal failure UTI poa IV ceftriaxone f/u c/s Likely acute on chronic diastolic CHF ( Last ech )  BNP 35K Iv [id number] given in ed 
-admit to chf pathway  Strict intake out record  Daily weight  Tele erial enzymes  
-diuresis with bumex   Ochoa card. consulted CT Chest wo con IMPRESSION: 
Increased bilateral lower lobe atelectasis versus pneumonia. Increased small 
left pleural effusion. Unchanged upper right chest wall soft tissue attenuation 
in the location of the medially resected right clavicle. Parox A-fib s/p AV node ablation  EKG V paced No acute ischemic injury pattern  Continue Hep/Coumadin bridging for subtheraputic INR 1.6  Daily INR  Continue metoprolol 
-Of note per patient she takes Coumadin 2 mg few days and 1 mg other days and she missed 2 doses for last few days because being sick. Will start on Coumadin 3 mg for now secondary to subtherapeutic INR Hypertensioncontinue home antihypertensive medication 
-per record She's been intolerant of CCB d/t edema PARAG on Chronic kidney disease stage IV could be  2/2 cardiorenal 
Renal function close to baseline  Cr.2.25 today  Before 1.9 
[facility] nephrology consulted DM  Start sliding scale  Lantus  daily Anemia likely secondary to chronic kidney disease Hg 8.7  Monitor and transfuse PRN Normocytic Anemia 
-H/H stable  
-monitor Obesity HLD Anxiety  Zoloft SSS Pacemaker Code Status: Full code Surrogate Decision MakerEnrirachel Del Castillo Spouse [phone] GI Prophylaxis: Patient is on chronically on Protonix and sacral fate for peptic ulcer disease per patient Dvt ppx: on coumadin with bridging heparin drip FUNCTIONAL STATUS PRIOR TO HOSPITALIZATION Ambulatory with Use of Assistive Devices Subjective: CHIEF COMPLAINT: Gradual onset of shortness of breath and dyspnea on exertion for few days HISTORY OF PRESENT ILLNESS:    
Anatoliy Riggins is a 63 y.o. female with a past medical history of paroxysmal atrial fibrillation on warfarin s/p AV node ablation with pacemaker replacement, hypertension, type 2 diabetes mellitus II, stage IV CKD, breast cancer s/p bl mastectomy, L BKA, s/p osteomyelitis complicated by staph bacteremia, anxiety/depression came with c/o  SOB and dyspnea on exertion with gradual onset for few days. Denies cough fever chills rigors body ache chest pain loss of smell or taste. Denies sick contact. Also complaining of lower extremity worsening edema. ED course patient was found to have elevated proBNP and chest x-ray with increased bilateral lower lobe atelectasis versus pneumonia and small left pleural effusion so patient was started IV ceftriaxone and azithromycin for possible  community-acquired pneumonia, rapid Covid was sent, also was given oral IV Lasix 60 mg for fluid overload, and was started on IV heparin drip for subtherapeutic INR. We were asked to admit for work up and evaluation of the above problems. CT Chest wo FINDINGS 
CHEST WALL: 6.4 x 2.9 cm soft tissue attenuation in the upper right chest wall 
is grossly unchanged. THYROID: No nodule. MEDIASTINUM: Multiple borderline reactive lymph nodes are unchanged. ASHLYN: No evidence of mass. THORACIC AORTA: No aneurysm. MAIN PULMONARY ARTERY: Normal in caliber. TRACHEA/BRONCHI: Patent. ESOPHAGUS: No wall thickening or dilatation. HEART: Mild cardiomegaly. Trace pericardial effusion. Extensive coronary artery 
calcific atherosclerosis. PLEURA: Small right pleural effusion is unchanged. Small left pleural effusion 
is increased. No pneumothorax. LUNGS: Increasing basilar bilateral lower lobe airspace opacities, greatest in 
the left lower lobe. No lung mass. No unexpected ground glass opacity. INCIDENTALLY IMAGED UPPER ABDOMEN: Extensive vascular calcifications. Possible 
cirrhosis. Trace ascites. BONES: No change. No fracture. Absent medial right clavicle. 2D ECHO  11/2020 ·  LV: Estimated LVEF is 50 - 55%. Visually measured ejection fraction. Normal cavity size and systolic function (ejection fraction normal). Mild concentric hypertrophy. Abnormal left ventricular septal motion consistent with right ventricular pacing. · LA: Moderately dilated left atrium. · RA: Mildly dilated right atrium. · AV: Aortic valve leaflet calcification present without reduced excursion. · MV: Mitral valve non-specific thickening. Moderate mitral annular calcification. Mild mitral valve stenosis is present. TV: Mild tricuspid valve regurgitation is present. Past Medical History:  
Diagnosis Date  Acquired lymphedema Right arm  Arrhythmia  Asthma  Atrial fibrillation ([address] Dr. Tamika Jeffrey and Dr. Charmaine alves [facility])  Cancer ([address] bilateral breast  
 Chronic kidney disease  Diabetes mellitus type II   
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  
Procedure Laterality Date  ABDOMEN SURGERY PROC UNLISTED    
 gastric bypass  GASTRIC BYPASS,OBESE<150CM SAW-EN-Y  7/08  
 hutcher  HX AFIB ABLATION    
 HX AMPUTATION FOOT Left 04/2020  HX BREAST RECONSTRUCTION Bilateral   
 HX CARPAL TUNNEL RELEASE [address] [facility] [address] Replaced by [facility] RIGHT MODIFIED RADICAL   
 HX MASTECTOMY Left   
 no lymphnode removal  
 HX MOHS PROCEDURES  1995  
 right  HX ORTHOPAEDIC Right   
 knee surgery  HX PACEMAKER  11/17/2020 Dr. Quang Mckinney. Insertion of permanent pacemaker. AV node ablation  HX PACEMAKER    
 IR INSERT TUNL CVC W PORT OVER 5 YEARS    
 IR INSERT TUNL CVC W/O PORT OVER 5 YR  5/4/2020  IR INSERT TUNL CVC W/O PORT OVER 5 YR  9/8/2020  IR REMOVE TUNL CVAD W/O PORT / PUMP  6/26/2020  IR REMOVE TUNL CVAD W/O PORT / PUMP  10/19/2020  [address]  8.21.07  
 SD [address] 9[address] AND 1994  SD ICAR CATHETER ABLATION ATRIOVENTR NODE FUNCTION N/A 11/17/2020 ABLATION AV NODE performed by Josh Bonilla MD at [address] CATH LAB  SD RELOCATION OF SKIN POCKET FOR PACEMAKER N/A 4/24/2020 RELOCATE [address] [place] [address] performed by David Singleton MD at [address] [facility] CATH LAB  SD RMVL TRANSVNS PM ELTRD 1 LEAD SYS ATR/VENTR N/A 4/24/2020 Remove Pacemaker Dual Leads performed by David Singleton MD at [facility] CARDIAC CATH LAB  SD RMVL TRANSVNS PM ELTRD 1 LEAD SYS ATR/VENTR N/A 4/27/2020 REMOVE PACEMAKER DUAL LEADS performed by David Singleton MD at [address] CATH LAB  SD TCAT INSJ/RPL PERM LEADLESS PACEMAKER RV W/IMG N/A 11/17/2020 INSERT OR REPLACE TRANSCATH PPM LEADLESS performed by Essence Nina MD at [address], [facility] CATH LAB  NM TRABECULOPLASTY BY LASER SURGERY    
 US GUIDE ASP OVARIAN CYST ABD APPROACH  8.21.07  
 RIGHT Social History Tobacco Use  Smoking status: Never Smoker  Smokeless tobacco: Never Used Substance Use Topics  Alcohol use: Not Currently Family History Problem Relation Age of Onset  Cancer Mother  Heart Disease Mother  Alcohol abuse Father  Diabetes Brother  Hypertension Brother Allergies Allergen Reactions  Avapro [Irbesartan] Unable to Obtain  Bactrim [Sulfamethoxazole-Trimethoprim] Other (comments) Sore mouth  Contrast Agent [Iodine] Itching [address]  Morphine Nausea and Vomiting and Swelling  Penicillins Hives Did not tolerate cefepime 3/2020  Pravachol [Pravastatin] Nausea Only  Seafood [Shellfish Containing Products] Hives  Singulair [Montelukast] Other (comments)  
  palpitations  Ace Inhibitors Cough  Amlodipine Swelling  Captopril Cough  Carvedilol Diarrhea Prior to Admission medications Medication Sig Start Date End Date Taking? Authorizing Provider  
bumetanide (BUMEX) 1 mg tablet Take 1 Tab by mouth two (2) times a day. 12/7/20   Tomer Mahoney MD  
pantoprazole (PROTONIX) 40 mg tablet Take 1 Tab by mouth Before breakfast and dinner. 12/7/20   Tomer Mahoney MD  
sucralfate (Carafate) 100 mg/mL suspension Take 10 mL by mouth four (4) times daily for 14 days. 12/7/20 12/21/20  Tomer Mahoney MD  
ondansetron (Zofran ODT) 4 mg disintegrating tablet Take 1 Tab by mouth every eight (8) hours as needed for Nausea or Vomiting. 12/7/20   Tomer Mahoney MD  
OTHER,NON-FORMULARY, Physical Therapy- eval and treat Dx- weakness from prolonged hospitalization 12/7/20   Tomer Mahoney MD  
sertraline (ZOLOFT) 100 mg tablet  10/30/20   Provider, Historical  
 warfarin (Coumadin) 1 mg tablet 2 mg M-W-FR and 1 all other days    Provider, Historical  
ondansetron (Zofran ODT) 4 mg disintegrating tablet Take 1 Tab by mouth every eight (8) hours as needed for Nausea. 11/5/20   Ivon ARIAS MD  
metoprolol tartrate (LOPRESSOR) 25 mg tablet Take 1 Tab by mouth two (2) times a day. 10/13/20   Tamar Stone ANP  
sertraline (ZOLOFT) 25 mg tablet Take 25 mg by mouth daily. Take with 100 mg tablet every morning 10/6/20   Provider, Historical  
insulin glargine (Lantus Solostar U-100 Insulin) 100 unit/mL (3 mL) inpn 10 Units by SubCUTAneous route Daily (before breakfast). Provider, Historical  
hydrALAZINE (APRESOLINE) 100 mg tablet Take 1 Tab by mouth three (3) times daily. 6/12/20   Crow Plascencia MD  
busPIRone (BUSPAR) 10 mg tablet TAKE ONE TABLET BY MOUTH TWICE A DAY 5/24/19   Crow Plascencia MD  
 
 
REVIEW OF SYSTEMS:    
I am not able to complete the review of systems because: The patient is intubated and sedated The patient has altered mental status due to his acute medical problems The patient has baseline aphasia from prior stroke(s) The patient has baseline dementia and is not reliable historian The patient is in acute medical distress and unable to provide information Total of 12 systems reviewed as follows:   
   POSITIVE= underlined text  Negative = text not underlined General:  fever, chills, sweats, generalized weakness, weight loss/gain,  
   loss of appetite Eyes:    blurred vision, eye pain, loss of vision, double vision ENT:    rhinorrhea, pharyngitis Respiratory:   cough, sputum production, SOB, CHOW, wheezing, pleuritic pain  
Cardiology:   chest pain, palpitations, orthopnea, PND, edema, syncope Gastrointestinal:  abdominal pain , N/V, diarrhea, dysphagia, constipation, bleeding Genitourinary:  frequency, urgency, dysuria, hematuria, incontinence Muskuloskeletal :  arthralgia, myalgia, back pain Hematology:  easy bruising, nose or gum bleeding, lymphadenopathy Dermatological: rash, ulceration, pruritis, color change / jaundice Endocrine:   hot flashes or polydipsia Neurological:  headache, dizziness, confusion, focal weakness, paresthesia, Speech difficulties, memory loss, gait difficulty Psychological: Feelings of anxiety, depression, agitation Objective: VITALS:   
Visit Vitals BP (!) 160/84 Pulse 91 Temp 97.9 °F (36.6 °C) Resp 25 Ht 5' 10\" (1.778 m) Wt 110.7 kg (244 lb) SpO2 100% BMI 35.01 kg/m² PHYSICAL EXAM: 
 
 
_______________________________________________________________________ Care Plan discussed with: 
  Comments Patient Family RN Care Manager Consultant:     
_______________________________________________________________________ Expected  Disposition:  
Home with Family HH/PT/OT/RN   
SNF/LTC   
RENE   
________________________________________________________________________ TOTAL TIME:  65  Minutes Critical Care Provided     Minutes non procedure based Comments  
 y Reviewed previous records  
>50% of visit spent in counseling and coordination of care y Discussion with patient and/or family and questions answered Given the patient's current clinical presentation, I have a high level of concern for decompensation if discharged from the ED. Complex decision making was performed which includes reviewing the patient's available past medical records, laboratory results, and Xray films. I have also directly communicated my plan and discussed this case with the involved ED physician.  
 
____________________________________________________________________ Shalom Chopra MD 
 
Procedures: see electronic medical records for all procedures/Xrays and details which were not copied into this note but were reviewed prior to creation of Plan. LAB DATA REVIEWED:   
Recent Results (from the past 24 hour(s)) EKG, 12 LEAD, INITIAL Collection Time: 12/19/20  4:43 PM  
Result Value Ref Range Ventricular Rate 91 BPM  
 Atrial Rate 102 BPM  
 QRS Duration 166 ms  
 Q-T Interval 450 ms QTC Calculation (Bezet) 553 ms Calculated R Axis -101 degrees Calculated T Axis 126 degrees Diagnosis Ventricular-paced rhythm When compared with ECG of 24-NOV-2020 06:22, No significant change was found CBC WITH AUTOMATED DIFF Collection Time: 12/19/20  4:48 PM  
Result Value Ref Range WBC 4.8 3.6 - 11.0 K/uL  
 RBC 3.82 3.80 - 5.20 M/uL HGB 8.7 (L) 11.5 - 16.0 g/dL HCT 29.9 (L) 35.0 - 47.0 % MCV 78.3 (L) 80.0 - 99.0 FL  
 MCH 22.8 (L) 26.0 - 34.0 PG  
 MCHC 29.1 (L) 30.0 - 36.5 g/dL RDW 20.0 (H) 11.5 - 14.5 % PLATELET 812 (H) 422 - 400 K/uL MPV 11.3 8.9 - 12.9 FL  
 NRBC 1.4 (H) 0  WBC ABSOLUTE NRBC 0.07 (H) 0.00 - 0.01 K/uL NEUTROPHILS 70 32 - 75 % LYMPHOCYTES 18 12 - 49 % MONOCYTES 10 5 - 13 % EOSINOPHILS 1 0 - 7 % BASOPHILS 1 0 - 1 % IMMATURE GRANULOCYTES 0 0.0 - 0.5 % ABS. NEUTROPHILS 3.4 1.8 - 8.0 K/UL ABS. LYMPHOCYTES 0.9 0.8 - 3.5 K/UL  
 ABS. MONOCYTES 0.5 0.0 - 1.0 K/UL  
 ABS. EOSINOPHILS 0.1 0.0 - 0.4 K/UL  
 ABS. BASOPHILS 0.1 0.0 - 0.1 K/UL  
 ABS. IMM. GRANS. 0.0 0.00 - 0.04 K/UL  
 DF AUTOMATED METABOLIC PANEL, COMPREHENSIVE Collection Time: 12/19/20  4:48 PM  
Result Value Ref Range Sodium 136 136 - 145 mmol/L Potassium 4.8 3.5 - 5.1 mmol/L Chloride 106 97 - 108 mmol/L  
 CO2 23 21 - 32 mmol/L Anion gap 7 5 - 15 mmol/L Glucose 124 (H) 65 - 100 mg/dL BUN 28 (H) 6 - 20 MG/DL Creatinine 2.55 (H) 0.55 - 1.02 MG/DL  
 BUN/Creatinine ratio 11 (L) 12 - 20 GFR est AA 23 (L) >60 ml/min/1.73m2 GFR est non-AA 19 (L) >60 ml/min/1.73m2 Calcium 9.1 8.5 - 10.1 MG/DL Bilirubin, total 1.3 (H) 0.2 - 1.0 MG/DL  
 ALT (SGPT) 15 12 - 78 U/L  
 AST (SGOT) 33 15 - 37 U/L Alk. phosphatase 118 (H) 45 - 117 U/L Protein, total 7.6 6.4 - 8.2 g/dL Albumin 3.4 (L) 3.5 - 5.0 g/dL Globulin 4.2 (H) 2.0 - 4.0 g/dL A-G Ratio 0.8 (L) 1.1 - 2.2    
TROPONIN I Collection Time: 12/19/20  4:48 PM  
Result Value Ref Range Troponin-I, Qt. <0.05 <0.05 ng/mL CK W/ REFLX CKMB Collection Time: 12/19/20  4:48 PM  
Result Value Ref Range  26 - 192 U/L  
NT-PRO BNP Collection Time: 12/19/20  4:48 PM  
Result Value Ref Range NT pro-BNP >35,000 (H) <125 PG/ML  
URINALYSIS W/ REFLEX CULTURE Collection Time: 12/19/20  4:48 PM  
 Specimen: Urine Result Value Ref Range Color YELLOW/STRAW Appearance CLOUDY (A) CLEAR Specific gravity 1.013 1.003 - 1.030    
 pH (UA) 5.5 5.0 - 8.0 Protein 100 (A) NEG mg/dL Glucose Negative NEG mg/dL Ketone Negative NEG mg/dL Bilirubin Negative NEG Blood Negative NEG Urobilinogen 1.0 0.2 - 1.0 EU/dL Nitrites Positive (A) NEG Leukocyte Esterase TRACE (A) NEG    
 WBC 5-10 0 - 4 /hpf  
 RBC 0-5 0 - 5 /hpf Epithelial cells FEW FEW /lpf  Bacteria 4+ (A) NEG /hpf  
 UA: UC IF INDICATED CULTURE NOT INDICATED BY UA RESULT CNI Hyaline cast 0-2 0 - 5 /lpf  
TROPONIN I Collection Time: 12/19/20 10:21 PM  
Result Value Ref Range Troponin-I, Qt. <0.05 <0.05 ng/mL PROTHROMBIN TIME + INR Collection Time: 12/19/20 10:31 PM  
Result Value Ref Range INR 1.6 (H) 0.9 - 1.1 Prothrombin time 16.0 (H) 9.0 - 11.1 sec

## 2020-12-20 NOTE — ED NOTES
Patient refused to be repositioned despite having stated that she has a \"sore on my butt\" offered pillows to reposition patient to relieve any pressure off of back, but patient still refused at this time.

## 2020-12-20 NOTE — PROGRESS NOTES
Pharmacy  Heparin Monitoring Indication: acs  
 
Goal aPTT: 58-77 seconds Initial dosing Weight: 110.7 kg Labs: 
Recent Labs 12/19/20 
2231 12/19/20 
1648 HGB  --  8.7* PLT  --  524* INR 1.6*  --   
 
 
Current rate:  9 unit/kg/hr Impression/Plan:  
New rate: 9 unit/kg/hr PRN bolus dose (Yes  Include Dose or No): yes Infusion rate, PRN bolus dose and aPTT results were discussed with the nurse: (Yes/No): not yet Thanks, 
Thomas Muñoz PHARMD 
 
[email or web address]/Pharmacy/[email or web address]/[email or web address]. pdf

## 2020-12-20 NOTE — PROGRESS NOTES
Patient arrived to unit from ED for admission. Dual skin with GURINDER Lopez. Patient has redness to sacrum, tear on upper left buttock. Patient has left BKA, prosthesis at bedside. Per report, nephrology and cardiology consults called. Patient on heparin drip, rate verified with GURINDER Lopez. PTT due again at 1

## 2020-12-20 NOTE — PROGRESS NOTES
TEE Plan · Patient spouse to transport at discharge · PT,OT consults recommended for disposition · 2nd IM needed before discharge Reason for Readmission:    Acute Respiratory Failure, UTI  
      
RUR Score/Risk Level:     41 % PCP: First and Last name:  Dr. Howard Christiansen Name of Practice:  
 Are you a current patient: Yes Yes/No:  
 Approximate date of last visit: 11/23/20 Can you participate in a virtual visit with your PCP: Yes Is a Care Conference indicated:  
Not at this time Did you attend your follow up appointment (s): If not, why not: Patient did not have a hospital follow up scheduled. Patient's PCP was booked until January. Patient has appointment scheduled for 1-6-21 Resources/supports as identified by patient/family:   Patient has multiple DME, and spouse as support. Top Challenges facing patient (as identified by patient/family and CM): Finances/Medication cost?   Has BCBS PPO - uses [employer] in [place] Transportation   Patient  transport patient's to appointments Support system or lack thereof? Spouse is supportive Living arrangements? Patient lives in a one story home with Spouse Self-care/ADLs/Cognition? Patient is independent at baseline Current Advanced Directive/Advance Care Plan:   
   Patient is a full code. Patient has AMD on file. Patient  Asher Rao Surrogate health care decision maker. Plan for utilizing home health:  TBD Transition of Care Plan: CM placed call to patient's room to discuss discharge planning. Pateint COVID 19 R/O. Patient could not be reached in her room. CM placed call to patient's  Taty Dumas [phone] to complete assessment. Patient name, , and demographics all verified. Patient [facility] from - for chest pain. Patient lives in a one story home with her . Patient has the following DME at home: can, walker, BSC, shower stool, raised toilet seat, prosthetic  Leg(BKA). Patient preferred pharmacy is [facility] in [place]. Patient has no SNF history, has a history of IPR at [facility]. Patient has [facility] history, but patient  can not recall name of agency.  drives patient to appointments. Readmission Assessment Number of days since last admission?: 8-30 days Previous disposition: Home with Family Who is being interviewed?: (Patient ) What was the patient's/caregiver's perception as to why they think they needed to return back to the hospital?: Other (Comment)(Patient became acutely ill regarding medical attention) Did you visit your Primary Care Physician after you left the hospital, before you returned this time?: No 
Why weren't you able to visit your PCP?: Could not get an appointment(Patient PCP did not have available appt until patient's pre scheduled appt on 2021) Did you see a specialist, such as Cardiac, Pulmonary, Orthopedic Physician, etc. after you left the hospital?: No 
Who advised the patient to return to the hospital?: Self-referral 
Does the patient report anything that got in the way of taking their medications?: No 
In our efforts to provide the best possible care to you and others like you, can you think of anything that we could have done to help you after you left the hospital the first time, so that you might not have needed to return so soon?: Other (Comment) Care Management Interventions PCP Verified by CM: Yes Last Visit to PCP: 20 Mode of Transport at Discharge: Other (see comment)(patient  to transport home ) Transition of Care Consult (CM Consult): Discharge Planning MyChart Signup: No 
Discharge Durable Medical Equipment: No 
Physical Therapy Consult: No 
Occupational Therapy Consult: No 
Speech Therapy Consult: No 
Current Support Network: Lives with Spouse Confirm Follow Up Transport: Family [employer] Information Provided?: No 
Discharge Location Discharge Placement: Home PURNIMA BoyerN, RN, 1955 [facility] Care Manager

## 2020-12-21 NOTE — CONSULTS
[address] [facility] Date of  Admission: 12/19/2020  5:06 PM  
 
Admission type:Emergency Consult for:  SOB, possible HFpEF Consult by:  Dr. Ajit Espino Physical: The patient was seen by me today by synchronous (real-time) audio-video technology. Subjective:  
 
Radha Cohn is a 63 y.o. female admitted for Acute respiratory failure ([facility] [J96.00] SOB (shortness of breath) [R06.02] Fluid overload [E87.70] Suspected COVID-19 virus infection [Z20.828] Pneumonia [J18.9] UTI (urinary tract infection) [N39.0] PARAG (acute kidney injury) ([facility] [N17.9] CKD (chronic kidney disease) [N18.9] Subtherapeutic international normalized ratio (INR) [R79.1] Anticoagulated [Z79.01]. Dieudonne Umanzor a 63 y.o. female with a past medical history of paroxysmal atrial fibrillation on warfarin s/p AV node ablation with pacemaker replacement, hypertension, type 2 diabetes mellitus II, stage IV CKD, breast cancer s/p bl mastectomy, L BKA, s/p osteomyelitis complicated by staph bacteremia, anxiety/depression came with c/o  SOB and dyspnea on exertion with gradual onset for few days. Denies cough fever chills rigors body ache chest pain loss of smell or taste. Denies sick contact. Also complaining of lower extremity worsening edema. ED course patient was found to have elevated proBNP and chest x-ray with increased bilateral lower lobe atelectasis versus pneumonia and small left pleural effusion so patient was started IV ceftriaxone and azithromycin for possible  community-acquired pneumonia, rapid Covid was sent, also was given oral IV Lasix 60 mg for fluid overload, and was started on IV heparin drip for subtherapeutic INR. The patient tells me that this feels typical for her diastolic heart failure exacerbations. Since admission, she has diuresed over 3 L and states she is feeling close to back to normal. 
 
 
Patient Active Problem List  
 Diagnosis Date Noted  S/P atrioventricular rolando ablation 11/17/2020 Priority: 1 - One  Acute respiratory failure ([address] 12/20/2020  UTI (urinary tract infection) 12/20/2020  Pneumonia 12/20/2020  
 SOB (shortness of breath) 12/20/2020  CKD (chronic kidney disease) 12/20/2020  Anticoagulated 12/20/2020  PARAG (acute kidney injury) ([address] 12/20/2020  Fluid overload 12/20/2020  Subtherapeutic international normalized ratio (INR) 12/20/2020  Suspected COVID-19 virus infection 12/20/2020  Chest pain 11/23/2020  Left below-knee amputee ([address] 06/11/2020  H/O bilateral mastectomy 03/26/2020  Foot deformity, right 09/24/2019  Pes cavus 09/24/2019  Diabetic ulcer of right midfoot associated with type 2 diabetes mellitus, with fat layer exposed ([address] 08/06/2019  Venous stasis ulcer of right lower leg with edema of right lower leg (HCC) 11/14/2018  Diabetic polyneuropathy associated with type 2 diabetes mellitus ([address] 11/13/2018  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, associated with type 2 diabetes mellitus ([address] 04/25/2018  Morbid obesity with BMI of 40.0-44.9, adult ([address] 05/01/2017  Controlled type 2 diabetes mellitus with stage 4 chronic kidney disease, with long-term current use of insulin ([address] 01/16/2017  PAF (paroxysmal atrial fibrillation) ([address] 11/28/2016  Anemia in stage 4 chronic kidney disease ([address] 11/28/2016  Essential hypertension 08/26/2016  Systolic murmur 57/77/8541  CKD (chronic kidney disease), stage IV ([address] 06/09/2012  Mixed hyperlipidemia 05/22/2012  Long term (current) use of anticoagulants 04/11/2012  S/P cardiac pacemaker procedure 04/03/2012  Sick sinus syndrome ([address] 04/02/2012  Status post ablation of atrial fibrillation 12/15/2011  Fe deficiency anemia 04/14/2010  
 History of breast cancer 04/13/2010  Asthma 04/13/2010 William Lee MD 
 Past Medical History:  
Diagnosis Date  Acquired lymphedema Right arm  Arrhythmia  Asthma  Atrial fibrillation ([facility] Dr. Alan Marsh and Dr. Varinder alves [facility])  Cancer ([facility] bilateral breast  
 Chronic kidney disease  Diabetes mellitus type II   
 Diabetic ulcer of left heel associated with type 2 diabetes mellitus, with fat layer exposed ([facility] 6/21/2019  Diabetic ulcer of left midfoot with necrosis of muscle ([facility] 3/3/2020  Dizziness  Essential hypertension  Hyperlipidemia  Hypertension  Long term (current) use of anticoagulants  Morbid obesity ([facility] 3/14/2012  Nausea & vomiting  Neuropathy  Osteoarthritis  Pacemaker  Sick sinus syndrome ([facility]  Type 2 diabetes mellitus with left diabetic foot infection ([facility] 10/29/2019 Social History Socioeconomic History  Marital status:  Spouse name: Not on file  Number of children: Not on file  Years of education: Not on file  Highest education level: Not on file Tobacco Use  Smoking status: Never Smoker  Smokeless tobacco: Never Used Substance and Sexual Activity  Alcohol use: Not Currently  Drug use: No  
 
Allergies Allergen Reactions  Avapro [Irbesartan] Unable to Obtain  Bactrim [Sulfamethoxazole-Trimethoprim] Other (comments) Sore mouth  Contrast Agent [Iodine] Itching [address]  Morphine Nausea and Vomiting and Swelling  Penicillins Hives Did not tolerate cefepime 3/2020  Pravachol [Pravastatin] Nausea Only  Seafood [Shellfish Containing Products] Hives  Singulair [Montelukast] Other (comments)  
  palpitations  Ace Inhibitors Cough  Amlodipine Swelling  Captopril Cough  Carvedilol Diarrhea Family History Problem Relation Age of Onset  Cancer Mother  Heart Disease Mother  Alcohol abuse Father  Diabetes Brother  Hypertension Brother Current Facility-Administered Medications Medication Dose Route Frequency  carvediloL (COREG) tablet 12.5 mg  12.5 mg Oral BID WITH MEALS  
 heparin 25,000 units in D5W 250 ml infusion  9-25 Units/kg/hr IntraVENous TITRATE  heparin (porcine) injection 2,000 Units  2,000 Units IntraVENous PRN Or  
 heparin (porcine) injection 4,000 Units  4,000 Units IntraVENous PRN  
 bumetanide (BUMEX) tablet 1 mg  1 mg Oral BID  busPIRone (BUSPAR) tablet 10 mg  10 mg Oral BID  hydrALAZINE (APRESOLINE) tablet 100 mg  100 mg Oral TID  pantoprazole (PROTONIX) tablet 40 mg  40 mg Oral ACB&D  
 sertraline (ZOLOFT) tablet 125 mg  125 mg Oral DAILY  sucralfate (CARAFATE) tablet 1 g  1 g Oral QID  sodium chloride (NS) flush 5-40 mL  5-40 mL IntraVENous Q8H  
 sodium chloride (NS) flush 5-40 mL  5-40 mL IntraVENous PRN  
 acetaminophen (TYLENOL) tablet 650 mg  650 mg Oral Q6H PRN Or  
 acetaminophen (TYLENOL) suppository 650 mg  650 mg Rectal Q6H PRN  polyethylene glycol (MIRALAX) packet 17 g  17 g Oral DAILY PRN  promethazine (PHENERGAN) tablet 12.5 mg  12.5 mg Oral Q6H PRN Or  
 ondansetron (ZOFRAN) injection 4 mg  4 mg IntraVENous Q6H PRN  
 cefTRIAXone (ROCEPHIN) 1 g in 0.9% sodium chloride (MBP/ADV) 50 mL MBP  1 g IntraVENous Q24H  
 insulin lispro (HUMALOG) injection   SubCUTAneous AC&HS  
 glucose chewable tablet 16 g  4 Tab Oral PRN  
 dextrose (D50W) injection syrg 12.5-25 g  12.5-25 g IntraVENous PRN  
 glucagon (GLUCAGEN) injection 1 mg  1 mg IntraMUSCular PRN  
 azithromycin (ZITHROMAX) 500 mg in 0.9% sodium chloride 250 mL (VIAL-MATE)  500 mg IntraVENous Q24H Review of Symptoms:  
11 systems reviewed, negative other than as stated in the HPI Objective:  
  
Visit Vitals BP (!) 109/55 (BP 1 Location: Left arm, BP Patient Position: At rest) Pulse 92 Temp 98.1 °F (36.7 °C) Resp 18 Ht 5' 10\" (1.778 m) Wt 244 lb (110.7 kg) SpO2 97% BMI 35.01 kg/m² General PE Gen:  NAD Mental Status - Alert. General Appearance - Not in acute distress. HEENT: 
PER, EOM, no JVD Chest and Lung Exam  
Inspection: Accessory muscles - No use of accessory muscles in breathing. No audible wheezing. Normal effort in breathing. Symmetric excursion. Cardiovascular: No JVD Upper Extremity: Inspection - Bilateral - No Cyanotic nailbeds or Digital clubbing. Lower Extremity:  
Palpation: Edema - Bilateral - No edema. Neuro: A&O times 3, CN and motor grossly WNL Skin: no rashes or lesions on exposed skin, dry, intact Psych: normal mood, affect. Speech clear. Data Review:  
Recent Labs  
  12/20/20 
1310 12/20/20 
0114 12/19/20 
1648 WBC 5.8 5.7 4.8 HGB 8.8* 9.0* 8.7* HCT 30.3* 30.9* 29.9*  
* 442* 524* Recent Labs  
  12/21/20 
0317 12/20/20 
1310 12/20/20 
9686 12/19/20 
2231 12/19/20 
1648 NA  --  139  --   --  136 K  --  3.6  --   --  4.8  
CL  --  105  --   --  106 CO2  --  26  --   --  23  
GLU  --  112*  --   --  124* BUN  --  27*  --   --  28* CREA  --  2.33*  --   --  2.55* CA  --  9.0  --   --  9.1 MG 2.1  --   --   --   --   
PHOS  --  3.4  --   --   --   
ALB  --  3.0*  --   --  3.4* TBILI  --  1.0  --   --  1.3* ALT  --  10*  --   --  15 INR 1.5*  --  1.6* 1.6*  --   
 
 
Recent Labs  
  12/20/20 
1310 12/20/20 
0114 12/19/20 
2221 12/19/20 
1648 TROIQ <0.05 <0.05 <0.05 <0.05 Intake/Output Summary (Last 24 hours) at 12/21/2020 1958 Last data filed at 12/21/2020 1256 Gross per 24 hour Intake 550 ml Output 2400 ml Net -1850 ml  
  
 
Cardiographics Telemetry: v- paced ECG: v-paced Echocardiogram:  
· LV: Estimated LVEF is 50 - 55%. Visually measured ejection fraction. Normal cavity size and systolic function (ejection fraction normal). Mild concentric hypertrophy. Abnormal left ventricular septal motion consistent with right ventricular pacing. · LA: Moderately dilated left atrium. · RA: Mildly dilated right atrium. · AV: Aortic valve leaflet calcification present without reduced excursion. · MV: Mitral valve non-specific thickening. Moderate mitral annular calcification. Mild mitral valve stenosis is present. TV: Mild tricuspid valve regurgitation is present. CXR 12/19: 
Nonacute Chest CT 12/19: 
IMPRESSION: 
Increased bilateral lower lobe atelectasis versus pneumonia. Increased small 
left pleural effusion. Unchanged upper right chest wall soft tissue attenuation 
in the location of the medially resected right clavicle. Assessment:  
  
 Active Problems: 
  Acute respiratory failure ([address] (12/20/2020) UTI (urinary tract infection) (12/20/2020) Pneumonia (12/20/2020) SOB (shortness of breath) (12/20/2020) CKD (chronic kidney disease) (12/20/2020) Anticoagulated (12/20/2020) PARAG (acute kidney injury) ([address] (12/20/2020) Fluid overload (12/20/2020) Subtherapeutic international normalized ratio (INR) (12/20/2020) Suspected COVID-19 virus infection (12/20/2020) Plan:  
 
Ms. Bhargav Payne is a pleasant 56-year-old admitted with shortness of breath, suspected volume overload due to heart failure with preserved ejection fraction. Suspected heart failure with preserved ejection fraction: 
Echo November 27, 2020 with LVEF 50 to 55%, no significant valvular pathology Feeling much better after 3 L of diuresis. Continue same. Maintain tight blood pressure control. History of paroxysmal atrial fibrillation on Coumadin History of osteomyelitis/endocarditis requiring pacemaker removal and placement of leadless pacemaker CKD as per internal medicine Possible pneumonia/rule out Covid per internal medicine Duran Morgan MD  
 
 Patient seen and examined by me with the above nurse practitioner. I personally performed all components of the history, physical, and medical decision making and agree with the assessment and plan with minor modifications as noted. Suspected diastolic heart failure, improving with diuresis. PAF on Coumadin. History of endocarditis status post leadless pacemaker. Thanks for the consult. I appreciate the opportunity to participate in this patient's care and will follow with you. Pursuant to the emergency declaration under the [address] [place] [address], [facility] waiver authority and the [employer] Act, this Virtual Visit was conducted, with patient's consent, to reduce the patient's risk of exposure to COVID-19 and provide continuity of care for an established patient. Services were provided through a video synchronous discussion virtually to substitute for in-person visit.

## 2020-12-21 NOTE — PROGRESS NOTES
I reviewed pertinent labs and imaging, and discussed /agreed on the plan of care with Dr. Shannon Esqueda Hospitalist Progress Note NAME: Gwen Yeboah :  1960 MRN:  [id number] Assessment / Plan: 
 
SOB /CHOW- improved on room air R/o cvid rapid neg , PCR  Indeterminate  Repeat done Possible CAP - on ceftriaxone Fluid overload 2/2 CHF/Renal failure UTI  
- asymptomatic , no pyuria noted , 4 + bacteria - Ucx + GNR  On ceftriaxone  
- last month Ucx + for enterobacter sensitive to ceftriaxone  
-  
 
acute on chronic diastolic CHF ( Last ech )  BNP 35K 
- s/p  IV lasix now on Bumex PO  
-Uop 3.5 L overnight dry weight 225 lbs  
- on room air states feels better no SOB or oxygen needs  
- cardiology to see CT Chest wo con IMPRESSION: 
Increased bilateral lower lobe atelectasis versus pneumonia. Increased small 
left pleural effusion. Unchanged upper right chest wall soft tissue attenuation 
in the location of the medially resected right clavicle. Parox A-fib s/p AV node ablation  EKG V paced No acute ischemic injury pattern  Continue Hep/Coumadin bridging for subtheraputic INR 1.6  Daily INR 
 Continue metoprolol 
-Of note per patient she takes Coumadin 2 mg few days and 1 mg other days and she missed 2 doses for last few days because being sick. Will start on Coumadin 3 mg for now secondary to subtherapeutic INR Hypertensioncontinue home antihypertensive medication 
-per record She's been intolerant of CCB d/t edema - BP stable -160 
- monitor may need to adjust meds 
  
PARAG on Chronic kidney disease stage IV could be  2/2 cardiorenal 
Renal function close to baseline  Cr.2.33 today  Before 1.9 
- appreciate [facility]  
- renal U/S pending 
  
DM  Start sliding scale   BS  <200 
- monitor Anemia likely secondary to chronic kidney disease Hg 8.7  Monitor and transfuse PRN 
-Hgb 8.8 monitor  
 
  
Normocytic Anemia 
-H/H stable  
-monitor   
PAD 
- Hx L BKA  
  
Obesity 
 HLD- no meds  
 Anxiety  Zoloft  Buspar SSS Pacemaker  
-  
 
Code status: Full Prophylaxis: heparin gtts Recommended Disposition: Home w/Family Subjective: Chief Complaint / Reason for Physician Visit \"I am doing fine \". Discussed with RN events overnight. Pt seen in bed denies any complaints covid result pending Review of Systems: 
Symptom Y/N Comments  Symptom Y/N Comments Fever/Chills n   Chest Pain n   
Poor Appetite n   Edema n   
Cough n   Abdominal Pain n   
Sputum n   Joint Pain SOB/CHOW n   Pruritis/Rash Nausea/vomit n   Tolerating PT/OT Diarrhea n   Tolerating Diet n   
Constipation    Other Could NOT obtain due to:   
 
Objective: VITALS:  
Last 24hrs VS reviewed since prior progress note. Most recent are: 
Patient Vitals for the past 24 hrs: 
 Temp Pulse Resp BP SpO2  
12/21/20 0851 97.4 °F (36.3 °C) 91 16 (!) 168/82 96 % 12/21/20 0339 97.5 °F (36.4 °C) 88 18 (!) 147/85 96 % 12/20/20 2311 98.1 °F (36.7 °C) 91 18 (!) 154/81 97 % 12/20/20 2015 98.1 °F (36.7 °C) 92 18 (!) 161/93 97 % 12/20/20 1658 98.4 °F (36.9 °C) 90 20 (!) 170/91 99 % 12/20/20 1500  91 18 (!) 177/92 99 % 12/20/20 1400 98.4 °F (36.9 °C) 91 18 (!) 171/91 99 % 12/20/20 1300  91 19 (!) 171/91 100 % 12/20/20 1200  92 18 (!) 166/84 98 % 12/20/20 1100  91 19 (!) 163/81 98 % Intake/Output Summary (Last 24 hours) at 12/21/2020 1033 Last data filed at 12/21/2020 2269 Gross per 24 hour Intake 550 ml Output 2900 ml Net -2350 ml I had a face to face encounter and independently examined this patient on 12/21/2020, as outlined below: PHYSICAL EXAM: 
General: WD, WN. Alert, cooperative, no acute distress EENT:  EOMI. Anicteric sclerae. MMM Resp:  CTA bilaterally, no wheezing or rales. No accessory muscle use CV:  Regular  rhythm,  No edema GI:  Soft, Non distended, Non tender. +Bowel sounds Neurologic:  Alert and oriented X 3, normal speech, Psych:   Good insight. Not anxious nor agitated Skin:  No rashes. No jaundice Reviewed most current lab test results and cultures  YES Reviewed most current radiology test results   YES Review and summation of old records today    NO Reviewed patient's current orders and MAR    YES 
PMH/SH reviewed - no change compared to H&P 
________________________________________________________________________ Care Plan discussed with: 
  Comments Patient x Family RN x Care Manager Consultant Multidiciplinary team rounds were held today with , nursing, pharmacist and clinical coordinator. Patient's plan of care was discussed; medications were reviewed and discharge planning was addressed. ________________________________________________________________________ Total NON critical care TIME:  30   Minutes Total CRITICAL CARE TIME Spent:   Minutes non procedure based Comments >50% of visit spent in counseling and coordination of care    
________________________________________________________________________ Eli Jauregui NP Procedures: see electronic medical records for all procedures/Xrays and details which were not copied into this note but were reviewed prior to creation of Plan. LABS: 
I reviewed today's most current labs and imaging studies. Pertinent labs include: 
Recent Labs  
  12/20/20 
1310 12/20/20 
0114 12/19/20 
1648 WBC 5.8 5.7 4.8 HGB 8.8* 9.0* 8.7* HCT 30.3* 30.9* 29.9*  
* 442* 524* Recent Labs  
  12/21/20 
0317 12/20/20 
1310 12/20/20 
2951 12/19/20 
2231 12/19/20 
1648 NA  --  139  --   --  136 K  --  3.6  --   --  4.8  
CL  --  105  --   --  106 CO2  --  26  --   --  23  
GLU  --  112*  --   --  124* BUN  --  27*  --   --  28* CREA  --  2.33*  --   --  2.55* CA  --  9.0  --   --  9.1 MG 2.1  --   --   --   --   
 PHOS  --  3.4  --   --   --   
ALB  --  3.0*  --   --  3.4* TBILI  --  1.0  --   --  1.3* ALT  --  10*  --   --  15 INR 1.5*  --  1.6* 1.6*  --   
 
 
Signed: Eli Head, NP

## 2020-12-21 NOTE — PROGRESS NOTES
Pharmacy Daily Dosing of Warfarin Indication: Afib Goal INR: 2-3 PTA Warfarin Dose: 2 mg on MWF and 1 mg TTSS Concurrent anticoagulants: Heparin gtt Concurrent antiplatelet: None Major Interacting Medications Drugs that may increase INR: Azithromycin Drugs that may decrease INR: None Conditions that may increase/decrease INR (CHF, C. diff, cirrhosis, thyroid disorder, hypoalbuminemia): None Labs: 
Recent Labs  
  12/21/20 
0317 12/20/20 
1310 12/20/20 
0005 12/20/20 
0114 12/19/20 
2231 12/19/20 
1648 INR 1.5*  --  1.6*  --  1.6*  --   
HGB  --  8.8*  --  9.0*  --  8.7* PLT  --  462*  --  442*  --  524* TBILI  --  1.0  --   --   --  1.3* ALB  --  3.0*  --   --   --  3.4* Impression/Plan:  
Will order warfarin 3 mg PO x 1 dose. Daily INR 
CBC w/o diff QOD Pharmacy will follow daily and adjust the dose as appropriate. Thanks Ester Koroma, PHARMD 
 
 
[email or web address]/Warfarin%20Dosing%20Protocol. pdf

## 2020-12-21 NOTE — PROGRESS NOTES
Nephrology Progress Note Monroe Kiser  
 
[email or web address]  Phone - [phone] Patient: Fatou Rashid    YOB: 1960     Admit Date: 12/19/2020 Date- 12/21/2020 CC: Follow up for PARAG ON CKD IMPRESSION & PLAN:  
·  PARAG - likely due to ATN , low bp, SEPSIS , ATN due to vanco.  
· Chf, sob · Covid nagative 
· HYPERTENSION · hypertension · H/O LEFT BKA · ckd  3 LIKELY due to DM nephropathy and HTN nephrosclerosis- BL CR. 1.5-1.9 
· H/O Diabetic foot · Proteinuria likely due to DM nephropathy and HTN nephroscl- urine pr/cr 1.5 g/g · anemia iron defi - iron sats 10% · Vit d defi PLAN- 
? Continue bumex 1 m g bid ? Check bmp today ? Continue hydralazine ? Change metoprolol to coreg ? Avoid acei or arb 
? Follow bmp in am 
  
Subjective: Interval History:  
- no labs done today Sob improving 
bp high ROS:- As documented above Objective:  
Vitals:  
 12/20/20 2311 12/21/20 0339 12/21/20 0851 12/21/20 1049 BP: (!) 154/81 (!) 147/85 (!) 168/82 (!) 161/74 Pulse: 91 88 91 92 Resp: 18 18 16 Temp: 98.1 °F (36.7 °C) 97.5 °F (36.4 °C) 97.4 °F (36.3 °C) (!) 96.7 °F (35.9 °C) SpO2: 97% 96% 96% 98% Weight:      
Height:      
  
12/20 0701 - 12/21 0700 In: 550 [I.V.:550] Out: 3900 [[id number]] Last 3 Recorded Weights in this Encounter 12/19/20 1638 Weight: 110.7 kg (244 lb) Physical exam:  
GEN: NAD NECK- Supple, no mass RESP: No wheezing, Clear b/l CVS: S1,S2  RRR 
NEURO: Normal speech, Non focal 
Abdo- soft, NT 
PSYCH: Normal Mood Chart reviewed. Pertinent Notes reviewed. Data Review : 
Recent Labs  
  12/21/20 
0317 12/20/20 
1310 12/19/20 
1648 NA  --  139 136 K  --  3.6 4.8  
CL  --  105 106 CO2  --  26 23 BUN  --  27* 28* CREA  --  2.33* 2.55* GLU  --  112* 124* CA  --  9.0 9.1 MG 2.1  --   --   
PHOS  --  3.4  --   
 
Recent Labs  
  12/20/20 
1310 12/20/20 
0114 12/19/20 
1700 WBC 5.8 5.7 4.8 HGB 8.8* 9.0* 8.7* HCT 30.3* 30.9* 29.9*  
* 442* 524* No results for input(s): FE, TIBC, PSAT, FERR in the last 72 hours. Medication list  reviewed Current Facility-Administered Medications Medication Dose Route Frequency  warfarin (COUMADIN) tablet 3 mg  3 mg Oral ONCE  
 heparin 25,000 units in D5W 250 ml infusion  9-25 Units/kg/hr IntraVENous TITRATE  heparin (porcine) injection 2,000 Units  2,000 Units IntraVENous PRN Or  
 heparin (porcine) injection 4,000 Units  4,000 Units IntraVENous PRN  
 bumetanide (BUMEX) tablet 1 mg  1 mg Oral BID  busPIRone (BUSPAR) tablet 10 mg  10 mg Oral BID  hydrALAZINE (APRESOLINE) tablet 100 mg  100 mg Oral TID  metoprolol tartrate (LOPRESSOR) tablet 25 mg  25 mg Oral BID  pantoprazole (PROTONIX) tablet 40 mg  40 mg Oral ACB&D  
 sertraline (ZOLOFT) tablet 125 mg  125 mg Oral DAILY  sucralfate (CARAFATE) tablet 1 g  1 g Oral QID  sodium chloride (NS) flush 5-40 mL  5-40 mL IntraVENous Q8H  
 sodium chloride (NS) flush 5-40 mL  5-40 mL IntraVENous PRN  
 acetaminophen (TYLENOL) tablet 650 mg  650 mg Oral Q6H PRN Or  
 acetaminophen (TYLENOL) suppository 650 mg  650 mg Rectal Q6H PRN  polyethylene glycol (MIRALAX) packet 17 g  17 g Oral DAILY PRN  promethazine (PHENERGAN) tablet 12.5 mg  12.5 mg Oral Q6H PRN Or  
 ondansetron (ZOFRAN) injection 4 mg  4 mg IntraVENous Q6H PRN  
 cefTRIAXone (ROCEPHIN) 1 g in 0.9% sodium chloride (MBP/ADV) 50 mL MBP  1 g IntraVENous Q24H  
 insulin lispro (HUMALOG) injection   SubCUTAneous AC&HS  
 glucose chewable tablet 16 g  4 Tab Oral PRN  
 dextrose (D50W) injection syrg 12.5-25 g  12.5-25 g IntraVENous PRN  
 glucagon (GLUCAGEN) injection 1 mg  1 mg IntraMUSCular PRN  
 azithromycin (ZITHROMAX) 500 mg in 0.9% sodium chloride 250 mL (VIAL-MATE)  500 mg IntraVENous Q24H [place], [address]  [facility] [place], [address] Phone - [phone]               Fax - [phone]

## 2020-12-22 NOTE — PROGRESS NOTES
I reviewed pertinent labs and imaging, and discussed /agreed on the plan of care with Dr. Nyla Garrido Hospitalist Progress Note NAME: Asaf Eid :  1960 MRN:  [id number] Assessment / Plan: 
 
Shortness of breath secondary to CAP and acute on chronic CHF exacerbation 
- CT Chest wo contrast- Increased bilateral lower lobe atelectasis versus pneumonia. Increased small 
left pleural effusion. Unchanged upper right chest wall soft tissue attenuation 
in the location of the medially resected right clavicle. - Bumex PO I mg BID - good response with 4 L diuresis  
- on room air states feels better no SOB or oxygen needs  
- cardiology to see - On meropenem because of ESBL in urine. Will discuss with ID for home regimen.  
- Covid PCR negative UTI  
- asymptomatic , no pyuria noted , 4 + bacteria - Ucx shows ESBL- on meropenem Parox A-fib s/p AV node ablation  Continue Hep/Coumadin bridging for subtheraputic INR 1.6  Daily INR 
 Coreg 12.5 mg BID 
-pharmacy dosing of coumadin Hypertensioncontinue home antihypertensive medication 
-on hydralazine 100 mg TID, Coreg.  
  
PARAG on Chronic kidney disease stage IV could be  2/2 cardiorenal 
BUN/Cr- 25/2.28, stable - appreciate [facility]  
- renal U/S pending Hypokalemia K 3.1, replaced.  
  
DM  Start sliding scale   BS  <200 
- monitor Anemia likely secondary to chronic kidney disease Hg 8.7 
- will check tomorrow PAD 
- Hx L BKA  
  
Obesity 
 HLD- no meds  
 Anxiety  Zoloft  Buspar SSS Pacemaker  
-  
 
Code status: Full Prophylaxis: heparin gtts Recommended Disposition: Home w/Family Subjective: Chief Complaint / Reason for Physician Visit Patient is doing fine, SOB is improved, clinically stable. Discussed with RN events overnight. Review of Systems: 
Symptom Y/N Comments  Symptom Y/N Comments Fever/Chills n   Chest Pain n   
Poor Appetite n   Edema n   
Cough n   Abdominal Pain n   
 Sputum n   Joint Pain SOB/CHOW n   Pruritis/Rash Nausea/vomit n   Tolerating PT/OT Diarrhea n   Tolerating Diet n   
Constipation    Other Objective: VITALS:  
Last 24hrs VS reviewed since prior progress note. Most recent are: 
Patient Vitals for the past 24 hrs: 
 Temp Pulse Resp BP SpO2  
12/22/20 1418 98.3 °F (36.8 °C) 90 16 (!) 146/73   
12/22/20 1105 98.1 °F (36.7 °C) 90 16 130/60 97 % 12/22/20 0829 97.7 °F (36.5 °C) 90 16 (!) 166/82 97 % 12/22/20 0313 98.5 °F (36.9 °C) 93 15 (!) 157/67 90 % 12/21/20 2313 98.3 °F (36.8 °C) 91 16 (!) 108/56 96 % 12/21/20 1951 98.1 °F (36.7 °C) 92 18 (!) 109/55 97 % 12/21/20 1904  90  (!) 99/52   
12/21/20 1603  89  (!) 144/69 96 % 12/21/20 1459 98.3 °F (36.8 °C) 91 16 (!) 120/57 97 % Intake/Output Summary (Last 24 hours) at 12/22/2020 1441 Last data filed at 12/22/2020 0430 Gross per 24 hour Intake 550 ml Output 450 ml Net 100 ml I had a face to face encounter and independently examined this patient on 12/22/2020, as outlined below: PHYSICAL EXAM: 
General: WD, WN. Alert, cooperative, no acute distress EENT:  EOMI. Anicteric sclerae. MMM Resp:  CTA bilaterally, no wheezing or rales. No accessory muscle use CV:  Regular  rhythm,  No edema GI:  Soft, Non distended, Non tender. +Bowel sounds Neurologic:  Alert and oriented X 3, normal speech, Psych:   Good insight. Not anxious nor agitated Skin:  No rashes. No jaundice Reviewed most current lab test results and cultures  YES Reviewed most current radiology test results   YES Review and summation of old records today    NO Reviewed patient's current orders and MAR    YES 
PMH/SH reviewed - no change compared to H&P 
________________________________________________________________________ Care Plan discussed with: 
  Comments Patient x Family RN x Care Manager Consultant Multidiciplinary team rounds were held today with , nursing, pharmacist and clinical coordinator. Patient's plan of care was discussed; medications were reviewed and discharge planning was addressed. ________________________________________________________________________ Total NON critical care TIME:  31   Minutes Comments >50% of visit spent in counseling and coordination of care    
________________________________________________________________________ Jerica Magdaleno MD  
 
Procedures: see electronic medical records for all procedures/Xrays and details which were not copied into this note but were reviewed prior to creation of Plan. LABS: 
I reviewed today's most current labs and imaging studies. Pertinent labs include: 
Recent Labs  
  12/20/20 
1310 12/20/20 
0114 12/19/20 
1648 WBC 5.8 5.7 4.8 HGB 8.8* 9.0* 8.7* HCT 30.3* 30.9* 29.9*  
* 442* 524* Recent Labs  
  12/22/20 
1251 12/22/20 
4637 12/21/20 
0317 12/20/20 
1310 12/20/20 
9900 12/19/20 
1648 12/19/20 
1648 NA  --  138  --  139  --   --  136 K  --  3.1*  --  3.6  --   --  4.8  
CL  --  104  --  105  --   --  106 CO2  --  29  --  26  --   --  23  
GLU  --  117*  --  112*  --   --  124* BUN  --  25*  --  27*  --   --  28* CREA  --  2.28*  --  2.33*  --   --  2.55* CA  --  8.3*  --  9.0  --   --  9.1 MG  --   --  2.1  --   --   --   --   
PHOS  --  3.2  --  3.4  --   --   --   
ALB  --  2.5*  --  3.0*  --   --  3.4* TBILI  --   --   --  1.0  --   --  1.3* ALT  --   --   --  10*  --   --  15 INR 1.6*  --  1.5*  --  1.6*   < >  --   
 < > = values in this interval not displayed.   
 
 
Signed: Jerica Magdaleno MD

## 2020-12-22 NOTE — PROGRESS NOTES
Physician Progress Note Sagrario Paige 
CSN #:                  [id number] :                       1960 ADMIT DATE:       2020 5:06 PM 
[address] DATE: 
RESPONDING 
PROVIDER #:        Jesu Patel MD 
 
 
 
 
QUERY TEXT: 
 
Dr Earl Right: 
 
Patient admitted with Possible CAP & Acute on chronic Diastolic CHF. Noted documentation of SOB /CHOW -  improved on room air and Acute respiratory failure as Problem List by Dr Renae Sifuentes. If possible, please document in progress notes and discharge summary if you are evaluating and /or treating any of the following: The medical record reflects the following: 
Risk Factors: 60yoF w/ fluid overload, possible CAP, CHF exacerbation, PARAG Clinical Indicators: Gradual onset of shortness of breath and dyspnea on exertion for few days RR 22 - 27  remained on RA  %   PN - SOB /CHOW- improved on room air Treatment:  diurese, IV abx, incentive spirometry, prn supplemental O2 Thank you, 
Kathryn King, [facility], Alessandro Mehul Isaac [address] Options provided: 
-- Acute respiratory failure confirmed -- Acute respiratory failure ruled out 
-- Other - I will add my own diagnosis -- Disagree - Not applicable / Not valid -- Disagree - Clinically unable to determine / Unknown 
-- Refer to Clinical Documentation Reviewer PROVIDER RESPONSE TEXT: 
 
After study, Acute respiratory failure ruled out.  
 
Query created by: Cheyenne Mason on 2020 2:15 PM 
 
 
Electronically signed by:  Jesu Patel MD 2020 2:38 PM

## 2020-12-22 NOTE — PROGRESS NOTES
[address]  [phone] Cardiology Progress Note 
 
 
12/22/2020 12:43 PM 
 
Admit Date: 12/19/2020 Admit Diagnosis:  
[facility] [J96.00] SOB (shortness of breath) [R06.02] Fluid overload [E87.70] Suspected COVID-19 virus infection [Z20.828] Pneumonia [J18.9] UTI (urinary tract infection) [N39.0] PARAG (acute kidney injury) ([facility] [N17.9] CKD (chronic kidney disease) [N18.9] Subtherapeutic international normalized ratio (INR) [R79.1] Anticoagulated [Z79.01] Subjective:  
 
 Pursuant to the emergency declaration under the [address] [place] [address], [facility] waiver authority and the [employer] Act, this Virtual  Visit was conducted, with patient's consent, to reduce the patient's risk of exposure to COVID-19 and provide continuity of care for an established patient. Discussed patient with RN and had conversation over the phone with the patient. Delma Sorenson is feeling better, sleepy. Diuresed 4.2L since admission,. Visit Vitals /60 (BP 1 Location: Left arm, BP Patient Position: At rest) Pulse 90 Temp 98.1 °F (36.7 °C) Resp 16 Ht 5' 10\" (1.778 m) Wt 244 lb (110.7 kg) SpO2 97% BMI 35.01 kg/m² Current Facility-Administered Medications Medication Dose Route Frequency  epoetin viet-epbx (RETACRIT) injection 20,000 Units  20,000 Units SubCUTAneous Q TUE, THU & SAT  carvediloL (COREG) tablet 12.5 mg  12.5 mg Oral BID WITH MEALS  
 heparin 25,000 units in D5W 250 ml infusion  9-25 Units/kg/hr IntraVENous TITRATE  heparin (porcine) injection 2,000 Units  2,000 Units IntraVENous PRN Or  
 heparin (porcine) injection 4,000 Units  4,000 Units IntraVENous PRN  
 bumetanide (BUMEX) tablet 1 mg  1 mg Oral BID  busPIRone (BUSPAR) tablet 10 mg  10 mg Oral BID  hydrALAZINE (APRESOLINE) tablet 100 mg  100 mg Oral TID  pantoprazole (PROTONIX) tablet 40 mg  40 mg Oral ACB&D  
 sertraline (ZOLOFT) tablet 125 mg  125 mg Oral DAILY  sucralfate (CARAFATE) tablet 1 g  1 g Oral QID  sodium chloride (NS) flush 5-40 mL  5-40 mL IntraVENous Q8H  
 sodium chloride (NS) flush 5-40 mL  5-40 mL IntraVENous PRN  
 acetaminophen (TYLENOL) tablet 650 mg  650 mg Oral Q6H PRN Or  
 acetaminophen (TYLENOL) suppository 650 mg  650 mg Rectal Q6H PRN  polyethylene glycol (MIRALAX) packet 17 g  17 g Oral DAILY PRN  promethazine (PHENERGAN) tablet 12.5 mg  12.5 mg Oral Q6H PRN Or  
 ondansetron (ZOFRAN) injection 4 mg  4 mg IntraVENous Q6H PRN  
 cefTRIAXone (ROCEPHIN) 1 g in 0.9% sodium chloride (MBP/ADV) 50 mL MBP  1 g IntraVENous Q24H  
 insulin lispro (HUMALOG) injection   SubCUTAneous AC&HS  
 glucose chewable tablet 16 g  4 Tab Oral PRN  
 dextrose (D50W) injection syrg 12.5-25 g  12.5-25 g IntraVENous PRN  
 glucagon (GLUCAGEN) injection 1 mg  1 mg IntraMUSCular PRN  
 azithromycin (ZITHROMAX) 500 mg in 0.9% sodium chloride 250 mL (VIAL-MATE)  500 mg IntraVENous Q24H Objective:  
  
Physical Exam: no performed Data Review:  
Recent Labs  
  12/20/20 
1310 12/20/20 
0114 12/19/20 
1648 WBC 5.8 5.7 4.8 HGB 8.8* 9.0* 8.7* HCT 30.3* 30.9* 29.9*  
* 442* 524* Recent Labs  
  12/22/20 
6303 12/21/20 
0317 12/20/20 
1310 12/20/20 
4392 12/19/20 
2231 12/19/20 
1648   --  139  --   --  136  
K 3.1*  --  3.6  --   --  4.8  
  --  105  --   --  106 CO2 29  --  26  --   --  23 *  --  112*  --   --  124* BUN 25*  --  27*  --   --  28* CREA 2.28*  --  2.33*  --   --  2.55* CA 8.3*  --  9.0  --   --  9.1 MG  --  2.1  --   --   --   --   
PHOS 3.2  --  3.4  --   --   --   
ALB 2.5*  --  3.0*  --   --  3.4* TBILI  --   --  1.0  --   --  1.3* ALT  --   --  10*  --   --  15 INR  --  1.5*  --  1.6* 1.6*  --   
 
 
Recent Labs  
  12/20/20 
1310 12/20/20 
0114 12/19/20 61 51 81 12/19/20 
1648 TROIQ <0.05 <0.05 <0.05 <0.05 Intake/Output Summary (Last 24 hours) at 12/22/2020 1243 Last data filed at 12/22/2020 0430 Gross per 24 hour Intake 550 ml Output 1150 ml Net -600 ml Telemetry: paced Assessment:  
 
Active Problems: 
  Acute respiratory failure ([facility] (12/20/2020) UTI (urinary tract infection) (12/20/2020) Pneumonia (12/20/2020) SOB (shortness of breath) (12/20/2020) CKD (chronic kidney disease) (12/20/2020) Anticoagulated (12/20/2020) PARAG (acute kidney injury) ([facility] (12/20/2020) Fluid overload (12/20/2020) Subtherapeutic international normalized ratio (INR) (12/20/2020) Suspected COVID-19 virus infection (12/20/2020) Plan:  
 
Ms. Zachery Banks is a pleasant 76-year-old admitted with shortness of breath, suspected volume overload due to heart failure with preserved ejection fraction. 
  
Suspected heart failure with preserved ejection fraction: 
Echo November 27, 2020 with LVEF 50 to 55%, no significant valvular pathology Feeling much better after 4 L of diuresis. Continue same. BP better controlled HX of PAF Remains in SR Restart coumadin for INR 2-3 History of osteomyelitis/endocarditis requiring pacemaker removal and placement of leadless pacemaker 
  
CKD 
 as per internal medicine 
  
Possible pneumonia/rule out Covid per internal medicine 
  
 
 
[facility] Cardiology Patient seen and examined by me with the above nurse practitioner. I personally performed all components of the history, physical, and medical decision making and agree with the assessment and plan with minor modifications as noted. DHF resolving. Continue diuresis.   Hopefully OK for DC soon from cardiac standpoint to f/u with Dr. Bharti Brown.

## 2020-12-22 NOTE — PROGRESS NOTES
Nephrology Progress Note Monroe Kiser  
 
[email or web address]  Phone - [phone] Patient: Debbi Manjarrez    YOB: 1960     Admit Date: 12/19/2020 Date- 12/22/2020 CC: Follow up for akiI ON CKD IMPRESSION & PLAN:  
· nela - likely due to ATN , low bp, SEPSIS , ATN due to vanco.  
· Hypokalemia · Luli Query · Covid nagative · Anemia  
· HYPERTENSION · hypertension · H/O LEFT BKA · ckd  3 LIKELY due to DM nephropathy and HTN nephrosclerosis- BL CR. 1.5-1.9 
· H/O Diabetic foot · Proteinuria likely due to DM nephropathy and HTN nephroscl- urine pr/cr 1.5 g/g · anemia iron defi - iron sats 10% · Vit d defi PLAN- 
? Continue Bumex 1 mg twice daily ? KCl 40 M EQ p.o. 
? Continue hydralazine, Coreg ? Avoid acei or arb 
? Follow bmp in am 
? Check iron panel 
? Start Epogen Subjective: Interval History:  
-Creatinine slowly improving Potassium low No shortness of breath per RN 
 
ROS:- Can't access due to patient's current condition Objective:  
Vitals:  
 12/21/20 1951 12/21/20 2313 12/22/20 7895 12/22/20 8496 BP: (!) 109/55 (!) 108/56 (!) 157/67 (!) 166/82 Pulse: 92 91 93 90 Resp: 18 16 15 16 Temp: 98.1 °F (36.7 °C) 98.3 °F (36.8 °C) 98.5 °F (36.9 °C) 97.7 °F (36.5 °C) SpO2: 97% 96% 90% 97% Weight:      
Height:      
  
12/21 0701 - 12/22 0700 In: 550 [I.V.:550] Out: 1150 [SEWAV:0395] Last 3 Recorded Weights in this Encounter 12/19/20 1638 Weight: 110.7 kg (244 lb) Physical exam:  
Patient not seen due to Covid isolation Chart reviewed. Pertinent Notes reviewed. Data Review : 
Recent Labs  
  12/22/20 
6899 12/21/20 
0317 12/20/20 
1310 12/19/20 
1648   --  139 136  
K 3.1*  --  3.6 4.8  
  --  105 106 CO2 29  --  26 23 BUN 25*  --  27* 28* CREA 2.28*  --  2.33* 2.55* *  --  112* 124* CA 8.3*  --  9.0 9.1 MG  --  2.1  --   --   
PHOS 3.2  --  3.4  --   
 
 Recent Labs  
  12/20/20 
1310 12/20/20 
0114 12/19/20 
1648 WBC 5.8 5.7 4.8 HGB 8.8* 9.0* 8.7* HCT 30.3* 30.9* 29.9*  
* 442* 524* No results for input(s): FE, TIBC, PSAT, FERR in the last 72 hours. Medication list  reviewed Current Facility-Administered Medications Medication Dose Route Frequency  potassium chloride SR (KLOR-CON 10) tablet 40 mEq  40 mEq Oral NOW  carvediloL (COREG) tablet 12.5 mg  12.5 mg Oral BID WITH MEALS  
 heparin 25,000 units in D5W 250 ml infusion  9-25 Units/kg/hr IntraVENous TITRATE  heparin (porcine) injection 2,000 Units  2,000 Units IntraVENous PRN Or  
 heparin (porcine) injection 4,000 Units  4,000 Units IntraVENous PRN  
 bumetanide (BUMEX) tablet 1 mg  1 mg Oral BID  busPIRone (BUSPAR) tablet 10 mg  10 mg Oral BID  hydrALAZINE (APRESOLINE) tablet 100 mg  100 mg Oral TID  pantoprazole (PROTONIX) tablet 40 mg  40 mg Oral ACB&D  
 sertraline (ZOLOFT) tablet 125 mg  125 mg Oral DAILY  sucralfate (CARAFATE) tablet 1 g  1 g Oral QID  sodium chloride (NS) flush 5-40 mL  5-40 mL IntraVENous Q8H  
 sodium chloride (NS) flush 5-40 mL  5-40 mL IntraVENous PRN  
 acetaminophen (TYLENOL) tablet 650 mg  650 mg Oral Q6H PRN Or  
 acetaminophen (TYLENOL) suppository 650 mg  650 mg Rectal Q6H PRN  polyethylene glycol (MIRALAX) packet 17 g  17 g Oral DAILY PRN  promethazine (PHENERGAN) tablet 12.5 mg  12.5 mg Oral Q6H PRN Or  
 ondansetron (ZOFRAN) injection 4 mg  4 mg IntraVENous Q6H PRN  
 cefTRIAXone (ROCEPHIN) 1 g in 0.9% sodium chloride (MBP/ADV) 50 mL MBP  1 g IntraVENous Q24H  
 insulin lispro (HUMALOG) injection   SubCUTAneous AC&HS  
 glucose chewable tablet 16 g  4 Tab Oral PRN  
 dextrose (D50W) injection syrg 12.5-25 g  12.5-25 g IntraVENous PRN  
 glucagon (GLUCAGEN) injection 1 mg  1 mg IntraMUSCular PRN  
 azithromycin (ZITHROMAX) 500 mg in 0.9% sodium chloride 250 mL (VIAL-MATE)  500 mg IntraVENous Q24H Wilner Garcia, [address] [facility] [employer] [address], [address] [place], [address] Phone - [phone]               Fax - [phone]

## 2020-12-23 NOTE — PROGRESS NOTES
Problem: Falls - Risk of 
Goal: *Absence of Falls Description: Document Brandee Ahuja Fall Risk and appropriate interventions in the flowsheet. Outcome: Progressing Towards Goal 
Note: Fall Risk Interventions: 
Mobility Interventions: Communicate number of staff needed for ambulation/transfer Mentation Interventions: Evaluate medications/consider consulting pharmacy Medication Interventions: Evaluate medications/consider consulting pharmacy Elimination Interventions: Patient to call for help with toileting needs Problem: Pressure Injury - Risk of 
Goal: *Prevention of pressure injury Description: Document Valdemar Scale and appropriate interventions in the flowsheet. Outcome: Progressing Towards Goal 
Note: Pressure Injury Interventions: 
Sensory Interventions: Assess changes in LOC, Assess need for specialty bed Moisture Interventions: Absorbent underpads, Check for incontinence Q2 hours and as needed Activity Interventions: Pressure redistribution bed/mattress(bed type) Mobility Interventions: Pressure redistribution bed/mattress (bed type) Nutrition Interventions: Document food/fluid/supplement intake Friction and Shear Interventions: HOB 30 degrees or less

## 2020-12-23 NOTE — PROGRESS NOTES
Hospitalist Progress Note NAME: Tomas Juarez :  1960 MRN:  [id number] Assessment / Plan: 
 
Shortness of breath secondary to CAP and acute on chronic CHF exacerbation 
- CT Chest wo contrast- Increased bilateral lower lobe atelectasis versus pneumonia. Increased small left pleural effusion. Unchanged upper right chest wall soft tissue attenuation in the location of the medially resected right clavicle. - Bumex PO I mg BID - good response with 5 L diuresis  
- on room air states feels better no SOB or oxygen needs  
- cardiology to see - On meropenem because of ESBL in urine. Will discuss with ID for home regimen.  
- Covid PCR negative UTI  
- asymptomatic , no pyuria noted , 4 + bacteria - Ucx shows ESBL- on meropenem Parox A-fib s/p AV node ablation  Continue Hep/Coumadin bridging for subtheraputic INR 1.7  Daily INR 
 Coreg 12.5 mg BID 
-pharmacy dosing of coumadin Hypertensioncontinue home antihypertensive medication 
-on hydralazine 100 mg TID, Coreg.  
  
PARAG on Chronic kidney disease stage IV could be  2/2 cardiorenal 
BUN/Cr-27/2.17, slightly improving. 
- appreciate [facility] nephrology  
- renal U/S pending Hypokalemia K 3.4, proved, replaced. 
  
DM  Start sliding scale   BS  <200 
- monitor Iron deficiency anemia 
-Venofer as per nephrology. Check CBC tomorrow. PAD 
- Hx L BKA  
  
Obesity 
 HLD- no meds  
 Anxiety  Zoloft  Buspar SSS Pacemaker  
-  
 
Code status: Full Prophylaxis: heparin gtts Recommended Disposition: Home w/Family Subjective: Chief Complaint / Reason for Physician Visit Patient is doing fine, SOB is improved, clinically stable. Discussed with RN events overnight. Review of Systems: 
Symptom Y/N Comments  Symptom Y/N Comments Fever/Chills n   Chest Pain n   
Poor Appetite n   Edema n   
Cough n   Abdominal Pain n   
Sputum n   Joint Pain SOB/CHOW n   Pruritis/Rash Nausea/vomit n   Tolerating PT/OT Diarrhea n   Tolerating Diet n   
Constipation    Other Objective: VITALS:  
Last 24hrs VS reviewed since prior progress note. Most recent are: 
Patient Vitals for the past 24 hrs: 
 Temp Pulse Resp BP SpO2  
12/23/20 1617 97 °F (36.1 °C) 92 18 (!) 113/57 96 % 12/23/20 1115 96.9 °F (36.1 °C) 90 18 (!) 126/59 96 % 12/23/20 0820 97.5 °F (36.4 °C) 90 18 (!) 168/78 91 % 12/23/20 0229 96.8 °F (36 °C) 90 18 (!) 118/56 95 % 12/22/20 2328 98.1 °F (36.7 °C) 89 18 135/61 96 % 12/22/20 2020 (!) 96.5 °F (35.8 °C) 90 18 (!) 105/58 95 % Intake/Output Summary (Last 24 hours) at 12/23/2020 1627 Last data filed at 12/23/2020 2416 Gross per 24 hour Intake 100 ml Output 1600 ml Net -1500 ml I had a face to face encounter and independently examined this patient on 12/23/2020, as outlined below: PHYSICAL EXAM: 
General: WD, WN. Alert, cooperative, no acute distress EENT:  EOMI. Anicteric sclerae. MMM Resp:  CTA bilaterally, no wheezing or rales. No accessory muscle use CV:  Regular  rhythm,  No edema GI:  Soft, Non distended, Non tender. +Bowel sounds Neurologic:  Alert and oriented X 3, normal speech, Psych:   Good insight. Not anxious nor agitated Skin:  No rashes. No jaundice Reviewed most current lab test results and cultures  YES Reviewed most current radiology test results   YES Review and summation of old records today    NO Reviewed patient's current orders and MAR    YES 
PMH/SH reviewed - no change compared to H&P 
________________________________________________________________________ Care Plan discussed with: 
  Comments Patient x Family RN x Care Manager Consultant  x Ronnyiciplinary team rounds were held today with , nursing, pharmacist and clinical coordinator. Patient's plan of care was discussed; medications were reviewed and discharge planning was addressed. ________________________________________________________________________ Total NON critical care TIME:  31   Minutes Comments >50% of visit spent in counseling and coordination of care    
________________________________________________________________________ Darell Nobles MD  
 
Procedures: see electronic medical records for all procedures/Xrays and details which were not copied into this note but were reviewed prior to creation of Plan. LABS: 
I reviewed today's most current labs and imaging studies. Pertinent labs include: No results for input(s): WBC, HGB, HCT, PLT, HGBEXT, HCTEXT, PLTEXT, HGBEXT, HCTEXT, PLTEXT in the last 72 hours. Recent Labs  
  12/23/20 
0529 12/22/20 
1251 12/22/20 
0629 12/21/20 
3455   --  138  --   
K 3.4*  --  3.1*  --   
  --  104  --   
CO2 29  --  29  --   
*  --  117*  --   
BUN 27*  --  25*  --   
CREA 2.17*  --  2.28*  --   
CA 8.6  --  8.3*  --   
MG  --   --   --  2.1 PHOS 3.6  --  3.2  --   
ALB 2.6*  --  2.5*  --   
INR 1.7* 1.6*  --  1.5* Signed: Darell Nobles MD

## 2020-12-23 NOTE — PROGRESS NOTES
[facility]  [phone] Cardiology Progress Note 
 
 
12/23/2020 12:43 PM 
 
Admit Date: 12/19/2020 Admit Diagnosis:  
Acute respiratory failure ([facility] 75.) [J96.00] SOB (shortness of breath) [R06.02] Fluid overload [E87.70] Suspected COVID-19 virus infection [Z20.828] Pneumonia [J18.9] UTI (urinary tract infection) [N39.0] [facility]acute kidney [facility]) [facility] [N17.9] CKD (chronic kidney disease) [N18.9] Subtherapeutic international normalized ratio (INR) [R79.1] Anticoagulated [Z79.01] Subjective:  
 
   Irven Flair is COVID neg. Up in chair, feeling better, less SOB. Diuresed 5.7L since admission. Visit Vitals BP (!) 113/57 Pulse 92 Temp 97 °F (36.1 °C) Resp 18 Ht 5' 10\" (1.778 m) Wt 244 lb (110.7 kg) SpO2 96% BMI 35.01 kg/m² Current Facility-Administered Medications Medication Dose Route Frequency  iron sucrose (VENOFER) 300 mg in 0.9% sodium chloride 250 mL IVPB  300 mg IntraVENous Q24H  warfarin (COUMADIN) tablet 2 mg  2 mg Oral ONCE  
 epoetin viet-epbx (RETACRIT) injection 20,000 Units  20,000 Units SubCUTAneous Q TUE, THU & SAT  WARFARIN INFORMATION NOTE (COUMADIN)   Other QPM  
 meropenem (MERREM) 500 mg in 0.9% sodium chloride (MBP/ADV) 50 mL MBP  0.5 g IntraVENous Q8H  
 carvediloL (COREG) tablet 12.5 mg  12.5 mg Oral BID WITH MEALS  
 bumetanide (BUMEX) tablet 1 mg  1 mg Oral BID  busPIRone (BUSPAR) tablet 10 mg  10 mg Oral BID  hydrALAZINE (APRESOLINE) tablet 100 mg  100 mg Oral TID  pantoprazole (PROTONIX) tablet 40 mg  40 mg Oral ACB&D  
 sertraline (ZOLOFT) tablet 125 mg  125 mg Oral DAILY  sucralfate (CARAFATE) tablet 1 g  1 g Oral QID  sodium chloride (NS) flush 5-40 mL  5-40 mL IntraVENous Q8H  
 sodium chloride (NS) flush 5-40 mL  5-40 mL IntraVENous PRN  
 acetaminophen (TYLENOL) tablet 650 mg  650 mg Oral Q6H PRN  Or  
  acetaminophen (TYLENOL) suppository 650 mg  650 mg Rectal Q6H PRN  polyethylene glycol (MIRALAX) packet 17 g  17 g Oral DAILY PRN  promethazine (PHENERGAN) tablet 12.5 mg  12.5 mg Oral Q6H PRN Or  
 ondansetron (ZOFRAN) injection 4 mg  4 mg IntraVENous Q6H PRN  
 insulin lispro (HUMALOG) injection   SubCUTAneous AC&HS  
 glucose chewable tablet 16 g  4 Tab Oral PRN  
 dextrose (D50W) injection syrg 12.5-25 g  12.5-25 g IntraVENous PRN  
 glucagon (GLUCAGEN) injection 1 mg  1 mg IntraMUSCular PRN  
 azithromycin (ZITHROMAX) 500 mg in 0.9% sodium chloride 250 mL (VIAL-MATE)  500 mg IntraVENous Q24H Objective:  
  
Physical Exam:  
General:  Slightly obese  female in no acute distress Chest:  Clear Heart:  2/6 m, no JVD, RRR Abd:  Soft, +BS Ext: L AKA, RLE no edema Neuro:  A&O x 3 Data Review: No results for input(s): WBC, HGB, HCT, PLT, HGBEXT, HCTEXT, PLTEXT, HGBEXT, HCTEXT, PLTEXT in the last 72 hours. Recent Labs  
  12/23/20 
0529 12/22/20 
1251 12/22/20 
0629 12/21/20 
9288   --  138  --   
K 3.4*  --  3.1*  --   
  --  104  --   
CO2 29  --  29  --   
*  --  117*  --   
BUN 27*  --  25*  --   
CREA 2.17*  --  2.28*  --   
CA 8.6  --  8.3*  --   
MG  --   --   --  2.1 PHOS 3.6  --  3.2  --   
ALB 2.6*  --  2.5*  --   
INR 1.7* 1.6*  --  1.5* No results for input(s): TROIQ, CPK, CKMB in the last 72 hours. Intake/Output Summary (Last 24 hours) at 12/23/2020 1620 Last data filed at 12/23/2020 7260 Gross per 24 hour Intake 100 ml Output 1600 ml Net -1500 ml Telemetry: paced Assessment:  
 
Active Problems: 
  Acute respiratory failure ([facility] 75.) (12/20/2020) UTI (urinary tract infection) (12/20/2020) Pneumonia (12/20/2020) SOB (shortness of breath) (12/20/2020) CKD (chronic kidney disease) (12/20/2020) Anticoagulated (12/20/2020) PARAG (acute kidney injury) ([facility] 75.) (12/20/2020) Fluid overload (12/20/2020) Subtherapeutic international normalized ratio (INR) (12/20/2020) Suspected COVID-19 virus infection (12/20/2020) Plan:  
 
Ms. Adonis Patel is a pleasant 56-year-old admitted with shortness of breath, suspected volume overload due to heart failure with preserved ejection fraction. 
  
Heart failure with preserved ejection fraction: 
Echo November 27, 2020 with LVEF 50 to 55%, no significant valvular pathology Feeling much better after 5 L of diuresis. Continue same. Patient states that at home she takes bumex 2mg daily vs prescribed bumex 1mg BID. Worried about nighttime urination And admits to forgetting about diuretics sometimes Encouraged compliance with medications. BP better controlled HX of PAF Remains in SR Restart coumadin for INR 2-3 History of osteomyelitis/endocarditis requiring pacemaker removal and placement of leadless pacemaker 
  
CKD 
 as per internal medicine 
  
 
[facility]iology RCA signing off. F/u with me within 2-3 weeks. Patient seen and examined by me with the above nurse practitioner. I personally performed all components of the history, physical, and medical decision making and agree with the assessment and plan with minor modifications as noted. On clarification of diuretics, it sounds like the patient takes 2 mg of Bumex daily most days instead of 1 mg twice a day. Here while on 1 mg p.o. twice a day, she is diuresing very well with resolution of acute diastolic heart failure. Continue same. Consult. We will sign off for now. Okay for discharge from cardiac standpoint soon. Recommend follow-up with me (patient follows up with Dr. Kory Del Rio for EP, but needs new general cardiologist as she used to see Dr. Cynthia Kulkarni) within 1 to 2 weeks.

## 2020-12-23 NOTE — PROGRESS NOTES
Nephrology Progress Note Monroe Kiser  
 
[email or web address]  Phone - [phone] Patient: Charbel Sheehan    YOB: 1960     Admit Date: 12/19/2020 Date- 12/23/2020 CC: Follow up for PARAG ON CKD IMPRESSION & PLAN:  
· PARAG - likely due to ATN , low bp, SEPSIS , ATN due to vanco.  
· Hypokalemia · Iron defi anemia - iron sats 3% · [place] · Covid nagative · Anemia  
· HYPERTENSION · hypertension · H/O LEFT BKA · ckd  3 LIKELY due to DM nephropathy and HTN nephrosclerosis- BL CR. 1.5-1.9 
· H/O Diabetic foot · Proteinuria likely due to DM nephropathy and HTN nephroscl- urine pr/cr 1.5 g/g · anemia iron defi - iron sats 10% · Vit d defi PLAN- 
? Start iv iron ? Continue Bumex 1 mg twice daily ? KCl 40 M EQ p.o. 
? Continue hydralazine, Coreg ? Avoid acei or arb 
? Follow bmp in am 
? Give Epogen OKAY TO D/C - RENAL STAND POINT Subjective: Interval History:  
-Creatinine slowly improving Potassium low No shortness of breath BP IMPROVED IRON DEFI + 
 
ROS:-No c/o sob,  No c/o chest pain, No c/o nausea or vomiting, No c/o  fever. Objective:  
Vitals:  
 12/22/20 2020 12/22/20 2328 12/23/20 8946 12/23/20 0820 BP: (!) 105/58 135/61 (!) 118/56 (!) 168/78 Pulse: 90 89 90 Resp: 18 18 18 18 Temp: (!) 96.5 °F (35.8 °C) 98.1 °F (36.7 °C) 96.8 °F (36 °C) 97.5 °F (36.4 °C) SpO2: 95% 96% 95% 91% Weight:      
Height:      
  
12/22 0701 - 12/23 0700 In: -  
Out: 8799 [[facility]:9408] Last 3 Recorded Weights in this Encounter 12/19/20 1638 Weight: 110.7 kg (244 lb) Physical exam:  
GEN:  NAD NECK:  Supple, no thyromegaly RESP: CTA  b/l, no  wheezing, CVS: RRR,S1,S2 ABDO:  soft , non tender, No mass NEURO: non focal, normal speech EXT: NO Edema +nt Chart reviewed. Pertinent Notes reviewed. Data Review : 
Recent Labs  
  12/23/20 
0529 12/22/20 
0629 12/21/20 
0317 12/20/20 
1310  138  --  139  
K 3.4* 3.1*  --  3.6  104  --  105 CO2 29 29  --  26 BUN 27* 25*  --  27* CREA 2.17* 2.28*  --  2.33* * 117*  --  112* CA 8.6 8.3*  --  9.0 MG  --   --  2.1  --   
PHOS 3.6 3.2  --  3.4 Recent Labs  
  12/20/20 
1310 WBC 5.8 HGB 8.8* HCT 30.3* * Recent Labs  
  12/23/20 
6045 TIBC 301 PSAT 3*  
FERR 17* Medication list  reviewed Current Facility-Administered Medications Medication Dose Route Frequency  epoetin viet-epbx (RETACRIT) injection 20,000 Units  20,000 Units SubCUTAneous Q TUE, THU & SAT  warfarin (COUMADIN) tablet 3 mg  3 mg Oral QHS  WARFARIN INFORMATION NOTE (COUMADIN)   Other QPM  
 meropenem (MERREM) 500 mg in 0.9% sodium chloride (MBP/ADV) 50 mL MBP  0.5 g IntraVENous Q8H  
 carvediloL (COREG) tablet 12.5 mg  12.5 mg Oral BID WITH MEALS  
 bumetanide (BUMEX) tablet 1 mg  1 mg Oral BID  busPIRone (BUSPAR) tablet 10 mg  10 mg Oral BID  hydrALAZINE (APRESOLINE) tablet 100 mg  100 mg Oral TID  pantoprazole (PROTONIX) tablet 40 mg  40 mg Oral ACB&D  
 sertraline (ZOLOFT) tablet 125 mg  125 mg Oral DAILY  sucralfate (CARAFATE) tablet 1 g  1 g Oral QID  sodium chloride (NS) flush 5-40 mL  5-40 mL IntraVENous Q8H  
 sodium chloride (NS) flush 5-40 mL  5-40 mL IntraVENous PRN  
 acetaminophen (TYLENOL) tablet 650 mg  650 mg Oral Q6H PRN Or  
 acetaminophen (TYLENOL) suppository 650 mg  650 mg Rectal Q6H PRN  polyethylene glycol (MIRALAX) packet 17 g  17 g Oral DAILY PRN  promethazine (PHENERGAN) tablet 12.5 mg  12.5 mg Oral Q6H PRN  Or  
 ondansetron (ZOFRAN) injection 4 mg  4 mg IntraVENous Q6H PRN  
 insulin lispro (HUMALOG) injection   SubCUTAneous AC&HS  
 glucose chewable tablet 16 g  4 Tab Oral PRN  
 dextrose (D50W) injection syrg 12.5-25 g  12.5-25 g IntraVENous PRN  
 glucagon (GLUCAGEN) injection 1 mg  1 mg IntraMUSCular PRN  
  azithromycin (ZITHROMAX) 500 mg in 0.9% sodium chloride 250 mL (VIAL-MATE)  500 mg IntraVENous Q24H Camilla Ochoa, [address]  [facility] [employer] [address] [place], [address] Phone - [phone]               Fax - [phone]

## 2020-12-23 NOTE — PROGRESS NOTES
PHYSICAL THERAPY EVALUATION/DISCHARGE Patient: Shirin Ferguson (4 y.o. female) Date: 12/23/2020 Primary Diagnosis: [facility] [J96.00] SOB (shortness of breath) [R06.02] Fluid overload [E87.70] Suspected COVID-19 virus infection [Z20.828] Pneumonia [J18.9] UTI (urinary tract infection) [N39.0] PARAG (acute kidney injury) ([facility] [N17.9] CKD (chronic kidney disease) [N18.9] Subtherapeutic international normalized ratio (INR) [R79.1] Anticoagulated [Z79.01] Precautions:    
 
 
ASSESSMENT Based on the objective data described below, the patient presents with baseline mobility, noted sob with activity(baseline), and decreased activity tolerance(baseline). Pt received supine in bed, agreeable to PT evaluation. Pt demonstrates all mobility at mod I with use of RW, except supine to sit, which she needed min a x 1. Pt reports that occasionally she needs her  assist at home to sit up in bed also. Pt able to Kavita charlotte he prosthesis. SOB noted and pt needed to take a standing rest break, which she reports is her baseline. VSS t/o session. No other deficits noted that warrant further PT intervention in the acute setting or at discharge. Functional Outcome Measure: The patient scored 65/100 on the Barthel Index outcome measure which is indicative of 35% impaired function/adls. Other factors to consider for discharge: supportive  Further skilled acute physical therapy is not indicated at this time. PLAN : 
Recommendation for discharge: (in order for the patient to meet his/her long term goals) No skilled physical therapy/ follow up rehabilitation needs identified at this time. This discharge recommendation: 
Has been made in collaboration with the attending provider and/or case management IF patient discharges home will need the following DME: none SUBJECTIVE:  
Patient stated I am doing just like I do at home.  OBJECTIVE DATA SUMMARY:  
 HISTORY:   
Past Medical History:  
Diagnosis Date  Acquired lymphedema Right arm  Arrhythmia  Asthma  Atrial fibrillation ([facility] Dr. Markus Birmingham and [facility])  Cancer ([facility] bilateral breast  
 Chronic kidney disease  Diabetes mellitus type II   
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  
Procedure Laterality Date  ABDOMEN SURGERY PROC UNLISTED    
 gastric bypass  GASTRIC BYPASS,OBESE<150CM [facility]EN[facility]Y  7[facility]08  
 [facility]  HX AFIB ABLATION    
 HX AMPUTATION FOOT Left 04/2020  HX BREAST RECONSTRUCTION Bilateral   
 HX CARPAL TUNNEL RELEASE [id number] RIGHT MODIFIED RADICAL   
 HX MASTECTOMY Left   
 no lymphnode removal  
 HX MOHS PROCEDURES  1995  
 right  HX ORTHOPAEDIC Right   
 knee surgery  HX PACEMAKER  11/17/2020 Dr. Michelle Toscano. Insertion of permanent pacemaker. AV node ablation  HX PACEMAKER    
 IR INSERT TUNL CVC W PORT OVER 5 YEARS    
 IR INSERT TUNL CVC W/O PORT OVER 5 YR  5/4/2020  IR INSERT TUNL CVC W/O PORT OVER 5 YR  9/8/2020  IR REMOVE TUNL CVAD W/O PORT / PUMP  6/26/2020  IR REMOVE TUNL CVAD W/O PORT / PUMP  10/19/2020  [address]  8.21.07  
 ME [address] 9[address] AND 1994  ME ICAR CATHETER ABLATION ATRIOVENTR NODE FUNCTION N/A 11/17/2020 [facility] [address] [facility]  MI RELOCATION OF SKIN POCKET FOR PACEMAKER N/A 4/24/2020 RELOCATE 2 [facility] performed by Estrella Warren MD at [address] CATH LAB  MI RMVL TRANSVNS PM ELTRD 1 LEAD SYS ATR/VENTR N/A 4/24/2020 Remove Pacemaker Dual Leads performed by Estrella Warren MD at [facility] CATH LAB  MI RMVL TRANSVNS PM ELTRD 1 LEAD SYS ATR/VENTR N/A 4/27/2020 REMOVE PACEMAKER DUAL LEADS performed by Estrella [place] MD at [address] CATH LAB  MI TCAT INSJ/RPL PERM LEADLESS PACEMAKER RV W/IMG N/A 11/17/2020 INSERT OR REPLACE TRANSCATH PPM LEADLESS [facility] [address] [facility]  MI TRABECULOPLASTY BY LASER SURGERY    
 US GUIDE ASP OVARIAN CYST ABD APPROACH  8.21.07  
 RIGHT Prior level of function: mod I with RW 
Personal factors and/or comorbidities impacting plan of care: none Home Situation Home Environment: Private residence Wheelchair Ramp: Yes Living Alone: No 
Support Systems: Spouse/Significant Other/Partner Current DME Used/Available at Home: Grab bars, Raised toilet seat, Shower chair, Walker, rolling Tub or Shower Type: Shower EXAMINATION/PRESENTATION/DECISION MAKING:  
Critical Behavior: 
Neurologic State: Alert Orientation Level: Oriented X4 Cognition: Appropriate for age attention/concentration, Appropriate safety awareness, Follows commands Hearing: Auditory Auditory Impairment: None Range Of Motion: 
AROM: Generally decreased, functional 
  
  
  
  
  
  
  
Strength:   
Strength: Generally decreased, functional 
  
  
  
  
  
  
Tone & Sensation:  
Tone: Normal 
  
  
  
  
Sensation: Intact Coordination: 
Coordination: Within functional limits Functional Mobility: 
Bed Mobility: 
Rolling: Modified independent Supine to Sit: Contact guard assistance;Minimum assistance Scooting: Modified independent Transfers: Sit to Stand: Modified independent Stand to Sit: Modified independent Bed to Chair: Modified independent Balance:  
Sitting: Intact Standing: Intact; With support Ambulation/Gait Training: 
Distance (ft): 32 Feet (ft) Assistive Device: Gait belt;Prosthetic device; Walker, rolling Ambulation - Level of Assistance: Modified independent;Stand-by assistance Gait Description (WDL): Exceptions to [facility] Gait Abnormalities: Decreased step clearance Speed/Kat: Pace decreased (<100 feet/min) Step Length: Left shortened;Right shortened Functional Measure: 
Barthel Index: 
 
Bathin Bladder: 5 Bowels: 10 
Groomin Dressing: 10 Feeding: 10 Mobility: 0 Stairs: 0 Toilet Use: 10 Transfer (Bed to Chair and Back): 15 Total: 65/100 The Barthel ADL Index: Guidelines 1. The index should be used as a record of what a patient does, not as a record of what a patient could do. 2. The main aim is to establish degree of independence from any help, physical or verbal, however minor and for whatever reason. 3. The need for supervision renders the patient not independent. 4. A patient's performance should be established using the best available evidence. Asking the patient, friends/relatives and nurses are the usual sources, but direct observation and common sense are also important. However direct testing is not needed. 5. Usually the patient's performance over the preceding 24-48 hours is important, but occasionally longer periods will be relevant. 6. Middle categories imply that the patient supplies over 50 per cent of the effort. 7. Use of aids to be independent is allowed. Cheri Valladares., Barthel, D.W. (4285). Functional evaluation: the Barthel Index. [facility] (14)2. LIZY Patiño, Rafael Rowan.Irma.[facility], [address] (1999). Measuring the change indisability after inpatient rehabilitation; comparison of the responsiveness of the Barthel Index and Functional Toole Measure. Journal of Neurology, Neurosurgery, and Psychiatry, 66(4), 944-310. JOÃO Moore, BLAISE Rosa, & Ale Graham M.A. (2004.) Assessment of post-stroke quality of life in cost-effectiveness studies: The usefulness of the Barthel Index and the EuroQoL-5D. [facility], 13, 109-87 Physical Therapy Evaluation Charge Determination History Examination Presentation Decision-Making LOW Complexity : Zero comorbidities / personal factors that will impact the outcome / POC LOW Complexity : 1-2 Standardized tests and measures addressing body structure, function, activity limitation and / or participation in recreation  LOW Complexity : Stable, uncomplicated  LOW Complexity : FOTO score of  Based on the above components, the patient evaluation is determined to be of the following complexity level: LOW Pain Rating: 
None reported Activity Tolerance:  
Fair, requires rest breaks and observed SOB with activity After treatment patient left in no apparent distress:  
Sitting in chair and Call bell within reach COMMUNICATION/EDUCATION:  
The patients plan of care was discussed with: Registered nurse and Case management. Fall prevention education was provided and the patient/caregiver indicated understanding. Thank you for this referral. 
America Jeffrey, PT Time Calculation: 19 mins

## 2020-12-23 NOTE — PROGRESS NOTES
End of Shift Note Bedside shift change report given to Adrian Pruitt RN (oncoming nurse) by Zeke Epley, RN (offgoing nurse). Report included the following information SBAR, Kardex, Intake/Output, MAR, Recent Results and Cardiac Rhythm Paced Shift worked:  [id number] Shift summary and any significant changes:  
  Patient refused to take sucralfate despite importance of medication and different ways she can take medication. Patient says pill is too big and does not like flavor. Concerns for physician to address:  Timing of antibiotics overnight (patient says it is interfering with her sleep. Zone phone for oncoming shift:   5939 Activity: 
Activity Level: Bed Rest (Up with assistance as tolerated - per MD) Number times ambulated in hallways past shift: 0 Number of times OOB to chair past shift: 0 Cardiac:  
Cardiac Monitoring: Yes     
Cardiac Rhythm: Paced Access:  
Current line(s): PIV Genitourinary:  
Urinary status: voiding and external catheter Respiratory:  
O2 Device: Room air Chronic home O2 use?: NO Incentive spirometer at bedside: NO 
  
GI: 
Last Bowel Movement Date: 12/22/2020 Current diet:  DIET DIABETIC CONSISTENT CARB Regular DIET ONE TIME MESSAGE Passing flatus: YES Tolerating current diet: YES Pain Management:  
Patient states pain is manageable on current regimen: N/A (no complaints of pain) Skin: 
Valdemar Score: 16 Interventions: float heels, increase time out of bed, PT/OT consult and internal/external urinary devices Patient Safety: 
Fall Score: Total Score: 3 Interventions: bed/chair alarm, assistive device (walker, cane, etc), gripper socks, pt to call before getting OOB and stay with me (per policy) High Fall Risk: Yes Length of Stay: 
Expected LOS: 4d 2h Actual LOS: 3 Zeke Epley

## 2020-12-23 NOTE — PROGRESS NOTES
Pharmacy Daily Dosing of Warfarin Indication: Afib Goal INR: 2-3 PTA Warfarin Dose: 2 mg on MWF and 1 mg TTSS Concurrent anticoagulants: Heparin gtt Concurrent antiplatelet: None Major Interacting Medications Drugs that may increase INR: Azithromycin Drugs that may decrease INR: None Conditions that may increase/decrease INR (CHF, C. diff, cirrhosis, thyroid disorder, hypoalbuminemia): None Labs: 
Recent Labs  
  12/23/20 
0529 12/22/20 
1251 12/21/20 
0317 12/21/20 
9819 INR 1.7* 1.6*  --  1.5* ALB 2.6*  --    < >  --   
 < > = values in this interval not displayed. Impression/Plan:  
Will order warfarin 2 mg PO x 1 dose. Daily INR 
CBC w/o diff QOD Pharmacy will follow daily and adjust the dose as appropriate. Thanks Wiliam Figueroa PHARMD 
 
 
[email or web address]. pdf

## 2020-12-23 NOTE — PROGRESS NOTES
End of Shift Note Bedside shift change report given to PCU RN (oncoming nurse) by Hank Dueñas, RN (offgoing nurse). Report included the following information SBAR and Kardex Shift worked:  7a-7p Shift summary and any significant changes: Pt worked with PT/OT, up to chair for most of the afternoon. Concerns for physician to address: None Zone phone for oncoming shift:    
  
 
Activity: 
Activity Level: Bed Rest 
Number times ambulated in hallways past shift: 0 Number of times OOB to chair past shift: 1 Cardiac:  
Cardiac Monitoring: Yes     
Cardiac Rhythm: Paced Access:  
Current line(s): PIV Genitourinary:  
Urinary status: voiding Respiratory:  
O2 Device: Room air Chronic home O2 use?: NO Incentive spirometer at bedside: N/A 
  
GI: 
Last Bowel Movement Date: 12/23/20 Current diet:  DIET DIABETIC CONSISTENT CARB Regular DIET ONE TIME MESSAGE Passing flatus: YES Tolerating current diet: YES Pain Management:  
Patient states pain is manageable on current regimen: N/A Skin: 
Valdemar Score: 13 Interventions: increase time out of bed Patient Safety: 
Fall Score: Total Score: 3 Interventions: pt to call before getting OOB and stay with me (per policy) High Fall Risk: Yes Length of Stay: 
Expected LOS: 4d 2h Actual LOS: 3 Hank Dueñas, RN

## 2020-12-24 NOTE — WOUND CARE
Wound care nurse consult: consult from staff nurse for left buttock wound. Patient's current staff nurse, Meghan Ng, states that patient came in with anasarca and a serous fluid blister opened on left buttock that is opened and not a small partial thickness wound. Patient not assessed due to Mitchell FAIRCHILD's knowledge and description of wound and his work at [facility]. 64 y/o CF admitted for fluid overload and SOB - CHF. Recommend and done by staff nurse: Left buttock wound: cleanse with soap and water, pat dry and apply Z-guard zinc cream to open and intact skin to protect and heel.  
 
Cyrus Cummings RN, [employer]

## 2020-12-24 NOTE — PROGRESS NOTES
Nephrology Progress Note Monroe Kiser  
 
[facility]. [facility]  Phone - [phone] Patient: Emeli Looney    YOB: 1960     Admit Date: 12/19/2020 Date- 12/24/2020 CC: Follow up for PARAG ON CKD IMPRESSION & PLAN:  
· PARAG  - likely due to ATN , low bp, SEPSIS , ATN due to vanco.  
· Hypokalemia · Iron defi anemia - iron sats 3% · Delorse Angel · Covid nagative · Anemia - CHRONIC- f/b DR. RUIZ 
· HYPERTENSION · hypertension · H/O LEFT BKA · ckd  3 LIKELY due to DM nephropathy and HTN nephrosclerosis- BL CR. 1.5-1.9 
· H/O Diabetic foot · Proteinuria likely due to DM nephropathy and HTN nephroscl- urine pr/cr 1.5 g/g · anemia iron defi - iron sats 10% · Vit d defi PLAN- 
? Continue iv iron ? Continue hydralazine, Coreg ? Avoid acei or arb 
? Follow bmp in am 
? Continue bumx WE WILL SIGN OFF. PLEASE CALL US IF NEEDED Subjective: Interval History:  
-CR STABLE 
bp stable Tolerated iv iron She reports that she is f/b  for her anemia ROS:-No c/o sob,  No c/o chest pain, No c/o nausea or vomiting, No c/o  fever. Objective:  
Vitals:  
 12/23/20 2001 12/23/20 2250 12/24/20 0317 12/24/20 1115 BP: (!) 117/57 (!) 143/65 139/64 (!) 124/56 Pulse: 91 90 91 91 Resp: 20 18 18 18 Temp: 97.4 °F (36.3 °C) 97.2 °F (36.2 °C) 96.8 °F (36 °C) 98.2 °F (36.8 °C) SpO2: 98% 96% 95% 95% Weight:      
Height:      
  
12/23 0701 - 12/24 0700 In: 290 [P.O.:240; I.V.:50] Out: 600 [Urine:600] Last 3 Recorded Weights in this Encounter 12/19/20 1638 Weight: 110.7 kg (244 lb) Physical exam:  
GEN:  nad NECK:  Supple, no thyromegaly RESP: clear  b/l, no  wheezing, CVS:s1,s2,rrr NEURO: non focal, normal speech EXT: NO Edema +nt Chart reviewed. Pertinent Notes reviewed. Data Review : 
Recent Labs  
  12/24/20 
0537 12/23/20 
0529 12/22/20 
0157  139 138  
K 3.8 3.4* 3.1*  
 104 104 CO2 28 29 29 BUN 28* 27* 25* CREA 2.28* 2.17* 2.28* * 108* 117* CA 8.7 8.6 8.3*  
MG 2.2  --   --   
PHOS 3.2 3.6 3.2 No results for input(s): WBC, HGB, HCT, PLT, HGBEXT, HCTEXT, PLTEXT, HGBEXT, HCTEXT, PLTEXT in the last 72 hours. Recent Labs  
  12/23/20 
8647 TIBC 301 PSAT 3*  
FERR 17* Medication list  reviewed Current Facility-Administered Medications Medication Dose Route Frequency  iron sucrose (VENOFER) 300 mg in 0.9% sodium chloride 250 mL IVPB  300 mg IntraVENous Q24H  
 epoetin viet-epbx (RETACRIT) injection 20,000 Units  20,000 Units SubCUTAneous Q TUE, THU & SAT  WARFARIN INFORMATION NOTE (COUMADIN)   Other QPM  
 meropenem (MERREM) 500 mg in 0.9% sodium chloride (MBP/ADV) 50 mL MBP  0.5 g IntraVENous Q8H  
 carvediloL (COREG) tablet 12.5 mg  12.5 mg Oral BID WITH MEALS  
 bumetanide (BUMEX) tablet 1 mg  1 mg Oral BID  busPIRone (BUSPAR) tablet 10 mg  10 mg Oral BID  hydrALAZINE (APRESOLINE) tablet 100 mg  100 mg Oral TID  pantoprazole (PROTONIX) tablet 40 mg  40 mg Oral ACB&D  
 sertraline (ZOLOFT) tablet 125 mg  125 mg Oral DAILY  sucralfate (CARAFATE) tablet 1 g  1 g Oral QID  sodium chloride (NS) flush 5-40 mL  5-40 mL IntraVENous Q8H  
 sodium chloride (NS) flush 5-40 mL  5-40 mL IntraVENous PRN  
 acetaminophen (TYLENOL) tablet 650 mg  650 mg Oral Q6H PRN Or  
 acetaminophen (TYLENOL) suppository 650 mg  650 mg Rectal Q6H PRN  polyethylene glycol (MIRALAX) packet 17 g  17 g Oral DAILY PRN  promethazine (PHENERGAN) tablet 12.5 mg  12.5 mg Oral Q6H PRN Or  
 ondansetron (ZOFRAN) injection 4 mg  4 mg IntraVENous Q6H PRN  
 insulin lispro (HUMALOG) injection   SubCUTAneous AC&HS  
 glucose chewable tablet 16 g  4 Tab Oral PRN  
 dextrose (D50W) injection syrg 12.5-25 g  12.5-25 g IntraVENous PRN  
 glucagon (GLUCAGEN) injection 1 mg  1 mg IntraMUSCular PRN Brittany Stevens MD             
 [facility] / [employer] [address] [place], [address] Phone - [phone]               Fax - [phone]

## 2020-12-24 NOTE — PROGRESS NOTES
0700- assumed care of pt this am. Pt is drowsy but oriented resting in bed at this time. 1330- pt has been given all discharge information with no questions at this time. Waiting on pt ride and cloths.

## 2020-12-24 NOTE — DISCHARGE SUMMARY
Hospitalist Discharge Summary Patient ID: 
Emeli Looney [id number] 
77 y.o. 
1960 12/19/2020 PCP on record: Ese Barnard MD 
 
Admit date: 12/19/2020 Discharge date and time: 12/24/2020 DISCHARGE DIAGNOSIS: 
 
Shortness of breath secondary to CAP and acute on chronic CHF exacerbation Asymptomatic UTI Parox A-fib s/p AV node ablation Hypertension Acute kidney injury on CKD stage IV- cardiorenal syndrome Diabetes mellitus type 2 Hypokalemia Iron deficiency anemia Anxiety disorder PAD s/p left BKA CONSULTATIONS: 
IP CONSULT TO CARDIOLOGY 
IP CONSULT TO NEPHROLOGY Excerpted HPI from H&P of Viral Castillo MD: 
Nissa Ricci a 63 y.o. female with a past medical history of paroxysmal atrial fibrillation on warfarin s/p AV node ablation with pacemaker replacement, hypertension, type 2 diabetes mellitus II, stage IV CKD, breast cancer s/p bl mastectomy, L BKA, s/p osteomyelitis complicated by staph bacteremia, anxiety/depression came with c/o  SOB and dyspnea on exertion with gradual onset for few days. Denies cough fever chills rigors body ache chest pain loss of smell or taste. Denies sick contact. Also complaining of lower extremity worsening edema. ED course patient was found to have elevated proBNP and chest x-ray with increased bilateral lower lobe atelectasis versus pneumonia and small left pleural effusion so patient was started IV ceftriaxone and azithromycin for possible  community-acquired pneumonia, rapid Covid was sent, also was given oral IV Lasix 60 mg for fluid overload, and was started on IV heparin drip for subtherapeutic INR. 
 
______________________________________________________________________ DISCHARGE SUMMARY/HOSPITAL COURSE:  for full details see H&P, daily progress notes, labs, consult notes. 69-year-old  female was admitted to the hospital for the management of respiratory failure secondary to community-acquired pneumonia and acute on chronic CHF examination. Patient was started on diuresis initially with Bumex IV which was changed to oral 1 mg twice daily. She responded well to the medication, diuresed nearly 5 L during the hospitalization. Patient initially was requiring oxygen supplementation via nasal cannula, weaned off slowly and slowly and on the day of discharge was on room air. Patient was a started IV antibiotics for committee for pneumonia. Blood culture was done which was negative. Urine culture was done which shows ESBL but patient is asymptomatic. Patient did received a dose of meropenem during the hospitalization. Patient was started back on Coumadin according to the pharmacy dosing for the subtherapeutic INR. Cardiology and nephrology was consulted. Patient acute kidney injury also resolved during the hospitalization, kidney function was stable. Patient discharged home with advised to follow-up with primary care doctor, cardiology, EP specialist. 
________________________________________________________________ Patient seen and examined by me on discharge day. Pertinent Findings: 
Gen:    Not in distress Chest: Clear lungs CVS:   Regular rhythm. No edema Abd:  Soft, not distended, not tender Neuro:  Alert,  
_______________________________________________________________________ DISCHARGE MEDICATIONS:  
Current Discharge Medication List  
  
START taking these medications Details  
sucralfate (CARAFATE) 1 gram tablet Take 1 Tab by mouth four (4) times daily for 30 days. Qty: 120 Tab, Refills: 0  
  
carvediloL (COREG) 12.5 mg tablet Take 1 Tab by mouth two (2) times daily (with meals) for 30 days. Qty: 60 Tab, Refills: 0 epoetin viet-epbx (RETACRIT) 10,000 unit/mL injection 2 mL by SubCUTAneous route Every Tues, Thur & Sat for 30 days. Indications: anemia due to kidney failure Qty: 24 mL, Refills: 0  
  
ferrous sulfate 324 mg (65 mg iron) tablet Take 1 Tab by mouth Daily (before breakfast) for 30 days. Qty: 30 Tab, Refills: 0 CONTINUE these medications which have CHANGED Details  
bumetanide (BUMEX) 1 mg tablet Take 1 Tab by mouth two (2) times a day for 30 days. Qty: 60 Tab, Refills: 0 CONTINUE these medications which have NOT CHANGED Details  
pantoprazole (PROTONIX) 40 mg tablet Take 1 Tab by mouth Before breakfast and dinner. Qty: 60 Tab, Refills: 1  
  
!! sertraline (ZOLOFT) 100 mg tablet   
  
warfarin (Coumadin) 1 mg tablet 2 mg M-W-FR and 1 all other days  
  
!! sertraline (ZOLOFT) 25 mg tablet Take 25 mg by mouth daily. Take with 100 mg tablet every morning  
  
hydrALAZINE (APRESOLINE) 100 mg tablet Take 1 Tab by mouth three (3) times daily. Qty: 90 Tab, Refills: 11  
 Associated Diagnoses: Essential hypertension  
  
busPIRone (BUSPAR) 10 mg tablet TAKE ONE TABLET BY MOUTH TWICE A DAY Qty: 60 Tab, Refills: 10  
 Associated Diagnoses: Anxiety as acute reaction to gross stress  
  
 !! - Potential duplicate medications found. Please discuss with provider. STOP taking these medications  
  
 sucralfate (Carafate) 100 mg/mL suspension Comments:  
Reason for Stopping:   
   
 ondansetron (Zofran ODT) 4 mg disintegrating tablet Comments:  
Reason for Stopping: OTHER,NON-FORMULARY, Comments:  
Reason for Stopping:   
   
 ondansetron (Zofran ODT) 4 mg disintegrating tablet Comments:  
Reason for Stopping:   
   
 metoprolol tartrate (LOPRESSOR) 25 mg tablet Comments:  
Reason for Stopping:   
   
 insulin glargine (Lantus Solostar U-100 Insulin) 100 unit/mL (3 mL) inpn Comments:  
Reason for Stopping:   
   
  
 
 
 
Patient Follow Up Instructions: Activity: Activity as tolerated Diet: Cardiac Diet Wound Care: As directed Please get your CBC, BMP, magnesium checked in 1 week after discharge from the hospital. 
Follow-up with PCP and cardiology as instructed If you have questions regarding the hospital related prescriptions or hospital related issues please call [address] [place] [address] at . You can always direct your questions to your primary care doctor if you are unable to reach your hospital physician; your PCP works as an extension of your hospital doctor just like your hospital doctor is an extension of your PCP for your time at [facility]. If you experience any of the following symptoms then please call your primary care physician or return to the emergency room if you cannot get hold of your doctor: 
Fever, chills, nausea, vomiting, diarrhea, change in mentation, falling, bleeding, shortness of breath Follow-up Information Follow up With Specialties Details Why Contact Info Jaycee Opitz, MD Cardiology, [address] [place] [address] Vascular Surgery, Internal Medicine Schedule an appointment as soon as possible for a visit in 2 weeks  [address] [place] [address] 
[phone] Windy Smith MD Internal Medicine On 1/6/2021 For scheduled appointment at 1:00PM  [address] [place] [address] [phone] Dearnasrin Arita MD Cardiology, Cardio Vascular Surgery, Internal Medicine In 2 weeks  [address] [place] [address] 
[phone] Jaycee Opitz, MD Cardiology, Cardio Vascular Surgery, Internal Medicine In 2 weeks  [address] [place] [address] 
[phone] 
  
  
 
________________________________________________________________ Risk of deterioration: Moderate Condition at Discharge:  Stable 
__________________________________________________________________ Disposition Home with family, no needs 
 
____________________________________________________________________ Code Status: Full Code 
___________________________________________________________________ Total time in minutes spent coordinating this discharge (includes going over instructions, follow-up, prescriptions, and preparing report for sign off to her PCP) :  >30 minutes Signed: 
Katya Seo MD

## 2020-12-24 NOTE — PROGRESS NOTES
TEE Plan: * Disposition: Home with spouse * Transport at D/C: Spouse around 3:00 pm 
* OP F/U: PCP on 1/6/21, Cardio on 1/4/21 Patient verbalized understanding of the plan. No further concerns indicated at this time. AVS updated. Patient is ready for discharge from a Care Management standpoint. RN informed. Care Management Interventions PCP Verified by CM: Yes Last Visit to PCP: 11/23/20 Mode of Transport at Discharge: Other (see comment)() Hospital Transport Time of Discharge: 1500 Transition of Care Consult (CM Consult): Other(home with OP f/u appts) MyChart Signup: No 
Discharge Durable Medical Equipment: No 
Physical Therapy Consult: Yes Occupational Therapy Consult: Yes Speech Therapy Consult: No 
Current Support Network: Lives with Spouse Confirm Follow Up Transport: Family [employer] Information Provided?: No 
Discharge Location Discharge Placement: Home with outpatient services Joseph Phoenix, LMSW Care Manager [id number] [facility] 
[phone]

## 2020-12-24 NOTE — DISCHARGE INSTRUCTIONS
HOSPITALIST DISCHARGE INSTRUCTIONS    NAME: Shirin Ferguson   :  1960   MRN:  [id number]     Date/Time:  2020 12:20 PM    ADMIT DATE: 2020   DISCHARGE DATE: 2020     Attending Physician: Sammy Castellanos MD    DISCHARGE DIAGNOSIS:  Shortness of breath secondary to CAP and acute on chronic CHF exacerbation  Asymptomatic UTI  Parox A-fib s/p AV node ablation  Hypertension  Acute kidney injury on CKD stage IV- cardiorenal syndrome  Diabetes mellitus type 2  Hypokalemia  Iron deficiency anemia  Anxiety disorder  PAD s/p left BKA      MEDICATIONS:  See above    · It is important that you take the medication exactly as they are prescribed. · Keep your medication in the bottles provided by the pharmacist and keep a list of the medication names, dosages, and times to be taken in your wallet. · Do not take other medications without consulting your doctor. Pain Management: per above medications    What to do at [address]:  Cardiac Diet    Recommended activity: Activity as tolerated    If you have questions regarding the hospital related prescriptions or hospital related issues please call [facility] at . You can always direct your questions to your primary care doctor if you are unable to reach your hospital physician; your PCP works as an extension of your hospital doctor just like your hospital doctor is an extension of your PCP for your time at [facility]. If you experience any of the following symptoms then please call your primary care physician or return to the emergency room if you cannot get hold of your doctor:  Fever, chills, nausea, vomiting, diarrhea, change in mentation, falling, bleeding, shortness of breath    Additional Instructions:  Please get your CBC, BMP, magnesium checked in 1 week after discharge from the hospital.  Follow-up with PCP and cardiology as instructed.     Bring these papers with you to your follow up appointments. The papers will help your doctors be sure to continue the care plan from the hospital.              Information obtained by :  I understand that if any problems occur once I am at home I am to contact my physician. I understand and acknowledge receipt of the instructions indicated above.                                                                                                                                            Physician's or R.N.'s Signature                                                                  Date/Time                                                                                                                                              Patient or Representative Signature                                                          Da

## 2020-12-24 NOTE — PROGRESS NOTES
OCCUPATIONAL THERAPY EVALUATION/DISCHARGE Patient: Jacoby Naik (1 y.o. female) Date: 12/24/2020 Primary Diagnosis: Acute respiratory failure ([facility] [J96.00] SOB (shortness of breath) [R06.02] Fluid overload [E87.70] Suspected COVID-19 virus infection [Z20.828] Pneumonia [J18.9] UTI (urinary tract infection) [N39.0] PARAG (acute kidney injury) ([facility] [N17.9] CKD (chronic kidney disease) [N18.9] Subtherapeutic international normalized ratio (INR) [R79.1] Anticoagulated [Z79.01] Precautions: Fall ASSESSMENT Based on the objective data described below, the patient presents with baseline self-care, decreased endurance, and decreased activity tolerance. Per patient report, she is at her baseline for self-care and states her  assists her with ADLs when needed. Overall, patient is independent to min assist for self-care and modified independent to min assist for functional mobility using RW. Patient required min assist for supine to sit and reports this is normal for her and her  helps her get out of bed. Patient don/doffed R shoe and L prothesis with set-up/supervision. Patient completed bathroom mobility with mod I and declined attempting toilet transfers stating her toilet at home is higher. Patient demonstrated good static standing balance while brushing teeth at sink. Patient does not have any additional acute OT needs at this time. Will complete order and sign off. Current Level of Function (ADLs/self-care): independent to min assist for ADLs. Functional Outcome Measure: The patient scored 70 on the Barthel Index outcome measure which is indicative of 30% functional impairment. Other factors to consider for discharge: good family support, DME at home PLAN : 
 
Recommendation for discharge: (in order for the patient to meet his/her long term goals) No skilled occupational therapy/ follow up rehabilitation needs identified at this time. This discharge recommendation: 
Has been made in collaboration with the attending provider and/or case management IF patient discharges home will need the following DME: patient owns DME required for discharge SUBJECTIVE:  
Patient stated I'm not a morning person.  OBJECTIVE DATA SUMMARY:  
HISTORY:  
Past Medical History:  
Diagnosis Date  Acquired lymphedema Right arm  Arrhythmia  Asthma  Atrial fibrillation ([facility] Dr. Tamika Jeffrey and Dr. Charmaine Gan [facility]  Cancer ([facility] bilateral breast  
 Chronic kidney disease  Diabetes mellitus type II   
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  
Procedure Laterality Date  ABDOMEN SURGERY PROC UNLISTED    
 gastric bypass  GASTRIC BYPASS,OBESE<150CM ASW-EN-Y  7/08  
 [facility]  HX AFIB ABLATION    
 HX AMPUTATION FOOT Left 04/2020  HX BREAST RECONSTRUCTION Bilateral   
 HX CARPAL TUNNEL RELEASE [address] [facility] [address] [facility] RIGHT MODIFIED RADICAL   
 HX MASTECTOMY Left   
 no lymphnode removal  
 HX MOHS PROCEDURES  1995  
 right  HX ORTHOPAEDIC Right   
 knee surgery  HX PACEMAKER  11/17/2020 Dr. Ninfa Simmons. Insertion of permanent pacemaker. AV node ablation  HX PACEMAKER    
 IR INSERT TUNL CVC W PORT OVER 5 YEARS    
 IR INSERT TUNL CVC W/O PORT OVER 5 YR  5/4/2020  IR INSERT TUNL CVC W/O PORT OVER 5 YR  9/8/2020  IR REMOVE TUNL CVAD W/O PORT / PUMP  6/26/2020  IR REMOVE TUNL CVAD W/O PORT / PUMP  10/19/2020  [address]  8.21.07  
 [place] AND [place]  [place] ICAR CATHETER ABLATION ATRIOVENTR NODE FUNCTION N/A 11/17/2020 ABLATION AV NODE performed by Joel Al MD at [address] CATH LAB  [place] RELOCATION OF SKIN POCKET FOR PACEMAKER N/A 4/24/2020 RELOCATE [address] performed by Mimi Tucker MD at [address] CATH LAB  [place] RMVL TRANSVNS PM ELTRD 1 LEAD SYS ATR/VENTR N/A 4/24/2020 Remove Pacemaker Dual Leads performed by Mimi Tucker MD at [facility] CATH LAB  [place] RMVL TRANSVNS PM ELTRD 1 LEAD SYS ATR/VENTR N/A 4/27/2020 REMOVE PACEMAKER DUAL LEADS performed by Mimi Tucker MD at [address] CATH LAB  [place] TCAT INSJ/RPL PERM LEADLESS PACEMAKER RV W/IMG N/A 11/17/2020 INSERT OR REPLACE TRANSCATH PPM LEADLESS performed by Joel Al MD at [address] CATH LAB  [place] TRABECULOPLASTY BY LASER SURGERY    
 US GUIDE ASP OVARIAN CYST ABD APPROACH  8.21.07  
 RIGHT Prior Level of Function/Environment/Context: Patient lived with  who provided assistance with self-care and bed mobility when needed. Patient cooks, drives, and completes her own shopping. Expanded or extensive additional review of patient history:  
Home Situation Home Environment: Private residence # Steps to Enter: 0 Wheelchair Ramp: Yes One/Two Story Residence: One story Living Alone: No 
Support Systems: Spouse/Significant Other/Partner Current DME Used/Available at Home: Grab bars, Raised toilet seat, Shower chair, Walker, rolling Tub or Shower Type: Shower Hand dominance: Right EXAMINATION OF PERFORMANCE DEFICITS: 
Cognitive/Behavioral Status: 
Neurologic State: Alert Orientation Level: Oriented X4 
 Cognition: Appropriate decision making; Appropriate for age attention/concentration; Appropriate safety awareness Perception: Appears intact Perseveration: No perseveration noted Safety/Judgement: Awareness of environment; Insight into deficits Hearing: Auditory Auditory Impairment: None Vision/Perceptual:   
Acuity: Within Defined Limits Corrective Lenses: Reading glasses Range of Motion: 
AROM: Generally decreased, functional 
 
Strength: 
Strength: Generally decreased, functional 
 
Coordination: 
Coordination: Within functional limits Fine Motor Skills-Upper: Left Intact; Right Intact Gross Motor Skills-Upper: Left Intact; Right Intact Tone & Sensation: 
Tone: Normal 
Sensation: Impaired(R foot; normal ) Balance: 
Sitting: Intact Functional Mobility and Transfers for ADLs: 
Bed Mobility: 
Supine to Sit: Minimum assistance; Additional time Sit to Supine: Modified independent Scooting: Modified independent Transfers: 
Sit to Stand: Modified independent Stand to Sit: Modified independent Bathroom Mobility: Modified independent ADL Assessment: 
Feeding: Independent Oral Facial Hygiene/Grooming: Setup;Supervision Bathing: Minimum assistance Upper Body Dressing: Modified independent Lower Body Dressing: Setup;Supervision Toileting: Contact guard assistance ADL Intervention and task modifications: 
Grooming Position Performed: Standing(at sink) Brushing Teeth: Set-up; Supervision Lower Body Dressing Assistance Socks: Modified independent Shoes with Cloth Laces: Set-up; Supervision Leg Crossed Method Used: No 
Position Performed: Bending forward method;Seated edge of bed Cognitive Retraining Safety/Judgement: Awareness of environment; Insight into deficits Functional Measure: 
Barthel Index: 
 
Bathin Bladder: 10 Bowels: 10 
Groomin Dressing: 10 Feeding: 10 Mobility: 0 Stairs: 0 Toilet Use: 10 Transfer (Bed to Chair and Back): 15 Total: 70/100 The Barthel ADL Index: Guidelines 1. The index should be used as a record of what a patient does, not as a record of what a patient could do. 2. The main aim is to establish degree of independence from any help, physical or verbal, however minor and for whatever reason. 3. The need for supervision renders the patient not independent. 4. A patient's performance should be established using the best available evidence. Asking the patient, friends/relatives and nurses are the usual sources, but direct observation and common sense are also important. However direct testing is not needed. 5. Usually the patient's performance over the preceding 24-48 hours is important, but occasionally longer periods will be relevant. 6. Middle categories imply that the patient supplies over 50 per cent of the effort. 7. Use of aids to be independent is allowed. Tammy Hill., Barthel, D.W. (2028). Functional evaluation: the Barthel Index. [facility] (14)2. Benjamin Singleton kaykay LIZY Moulton, Yony Garcia., Ap Perez., [place], [address] [place] [address] (1999). Measuring the change indisability after inpatient rehabilitation; comparison of the responsiveness of the Barthel Index and Functional [place] Measure. Journal of Neurology, Neurosurgery, and Psychiatry, 66(4), 060-338. Hai Villanueva, N.J.A, BLAISE Rosa, & Nereyda Leigh MMICKIE. (2004.) Assessment of post-stroke quality of life in cost-effectiveness studies: The usefulness of the Barthel Index and the EuroQoL-5D. [facility], 13, [phone] Based on the above components, the patient evaluation is determined to be of the following complexity level: LOW Pain Rating: 
Patient did not c/o pain during session. Activity Tolerance:  
Good and SpO2 stable on RA After treatment patient left in no apparent distress:   
Supine in bed, Call bell within reach and Side rails x 3 
 
COMMUNICATION/EDUCATION:  
 The patients plan of care was discussed with: Physical therapist and Registered nurse. Thank you for this referral. 
Mitzie Burkitt, OTR/L Time Calculation: 30 mins

## 2020-12-28 NOTE — ED PROVIDER NOTES
EMERGENCY DEPARTMENT HISTORY AND PHYSICAL EXAM 
 
 
Date: 12/19/2020 Patient Name: Margi Espinal History of Presenting Illness Chief Complaint Patient presents with  Shortness of Breath Pt states since wednesday her SOB from CHF has gotten worse, pt c/o CP, and foul smelling urine  Chest Pain  Urinary Odor History Provided By: Patient HPI: Margi Espinal, 63 y.o. female with PMHx as noted below presents to the ED with cc of SOB. Onset was a few days ago. Sx are constant and worse with exertion. Reports associated LE edema and orthopnea. Pt denies any other alleviating or exacerbating factors. Additionally, pt specifically denies any recent fever, chills, headache, nausea, vomiting, abdominal pain, CP,  lightheadedness, dizziness, numbness, weakness, heart palpitations, urinary sxs, diarrhea, constipation, melena, hematochezia, cough, or congestion. PCP: Jennifer Carter MD 
 
Current Outpatient Medications Medication Sig Dispense Refill  sucralfate (CARAFATE) 1 gram tablet Take 1 Tab by mouth four (4) times daily for 30 days. 120 Tab 0  carvediloL (COREG) 12.5 mg tablet Take 1 Tab by mouth two (2) times daily (with meals) for 30 days. 60 Tab 0  
 epoetin viet-epbx (RETACRIT) 10,000 unit/mL injection 2 mL by SubCUTAneous route Every Tues, Thur & Sat for 30 days. Indications: anemia due to kidney failure 24 mL 0  
 bumetanide (BUMEX) 1 mg tablet Take 1 Tab by mouth two (2) times a day for 30 days. 60 Tab 0  
 ferrous sulfate 324 mg (65 mg iron) tablet Take 1 Tab by mouth Daily (before breakfast) for 30 days. 30 Tab 0  
 pantoprazole (PROTONIX) 40 mg tablet Take 1 Tab by mouth Before breakfast and dinner. 60 Tab 1  
 sertraline (ZOLOFT) 100 mg tablet  warfarin (Coumadin) 1 mg tablet 2 mg M-W-FR and 1 all other days  sertraline (ZOLOFT) 25 mg tablet Take 25 mg by mouth daily. Take with 100 mg tablet every morning  hydrALAZINE (APRESOLINE) 100 mg tablet Take 1 Tab by mouth three (3) times daily. 90 Tab 11  
 busPIRone (BUSPAR) 10 mg tablet TAKE ONE TABLET BY MOUTH TWICE A DAY 60 Tab 10 Past History Past Medical History: 
Past Medical History:  
Diagnosis Date  Acquired lymphedema Right arm  Arrhythmia  Asthma  Atrial fibrillation ([facility] Dr. José Miguel Alvarado and [facility]  Cancer ([facility] bilateral breast  
 Chronic kidney disease  Diabetes mellitus type II   
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 
Past Surgical History:  
Procedure Laterality Date  ABDOMEN SURGERY PROC UNLISTED    
 gastric bypass  GASTRIC BYPASS,OBESE<150CM SAW-EN-Y  7/08  
 [facility]  HX AFIB ABLATION    
 HX AMPUTATION FOOT Left 04/2020  HX BREAST RECONSTRUCTION Bilateral   
 HX CARPAL TUNNEL RELEASE [address] [place] [id number] RIGHT MODIFIED RADICAL   
 HX MASTECTOMY Left   
 no lymphnode removal  
 HX MOHS PROCEDURES  1995  
 right  HX ORTHOPAEDIC Right   
 knee surgery  HX PACEMAKER  11/17/2020 Dr. Llanes Cons. Insertion of permanent pacemaker. AV node ablation  HX PACEMAKER    
 IR INSERT TUNL CVC W PORT OVER 5 YEARS    
 IR INSERT TUNL CVC W/O PORT OVER 5 YR  5/4/2020  IR INSERT TUNL CVC W/O PORT OVER 5 YR  9/8/2020  IR REMOVE TUNL CVAD W/O PORT / PUMP  6/26/2020  IR REMOVE TUNL CVAD W/O PORT / PUMP  10/19/2020  [address]  8.21.07  
 [place] [address] [place]  [place] IC[place] CATHE[place] ABLATION ATRIOVENTR NODE FUNCTION N/A 11/17/2020 ABLATION AV NODE performed by Adonis Donis MD at [address] CATH LAB  [place] REL[place]ATION OF [place] POCKET FOR PACEMAKER N/A 4/24/2020 RELOCATE [address] performed by Caden Mcgregor MD at [address] [facility] CATH LAB  [place] RMVL TRANSVNS PM ELTRD 1 LEAD SYS ATR/VENTR N/A 4/24/2020 Remove Pacemaker Dual Leads performed by Caden Mcgregor MD at [facility] CATH LAB  [place] RMVL TRANSVNS PM ELTRD 1 LEAD SYS ATR/VENTR N/A 4/27/2020 REMOVE PACEMAKER DUAL LEADS performed by Caden Mcgregor MD [address] CATH LAB  [place] TCAT INSJ/RPL PERM LEADLESS PACEMAKER RV W/IMG N/A 11/17/2020 INSERT OR REPLACE TRANSCATH PPM LEADLESS performed by Adonis Donis MD at [address] CATH LAB  [place] TRABECUL[place]LAST[place] BY LASER SURGERY    
 US GUIDE ASP OVARIAN CYST ABD APPROACH  8.21.07  
 RIGHT Family History: 
Family History Problem Relation Age of Onset  Cancer Mother  Heart Disease Mother  Alcohol abuse Father  Diabetes Brother  Hypertension Brother Social History: 
Social History Tobacco Use  Smoking status: Never Smoker  Smokeless tobacco: Never Used Substance Use Topics  Alcohol use: Not Currently  Drug use: No  
 
 
Allergies: Allergies Allergen Reactions  Avapro [Irbesartan] Unable to Obtain  Bactrim [Sulfamethoxazole-Trimethoprim] Other (comments) Sore mouth  Contrast Agent [Iodine] Itching [address]  Morphine Nausea and Vomiting and Swelling  Penicillins Hives Did not tolerate cefepime 3/2020  Pravachol [Pravastatin] Nausea Only  Seafood [Shellfish Containing Products] Hives  Singulair [Montelukast] Other (comments)  
  palpitations  Ace Inhibitors Cough  Amlodipine Swelling  Captopril Cough  Carvedilol Diarrhea Review of Systems Review of Systems Constitutional: Negative for fever, chills, and fatigue. HENT: Negative for congestion, sore throat, rhinorrhea, sneezing and neck stiffness Eyes: Negative for discharge and redness. Respiratory: positive for  shortness of breath. Negative  wheezing Cardiovascular: Negative for chest pain, palpitations Gastrointestinal: Negative for nausea, vomiting, abdominal pain, constipation, diarrhea and blood in stool. Genitourinary: Negative for dysuria, urgency, frequency, hematuria, flank pain, decreased urine volume, discharge, Musculoskeletal: Negative for myalgias or joint pain . Skin: Negative for rash or lesions . Neurological: Negative weakness, light-headedness, numbness and headaches. Physical Exam  
Physical Exam 
 
GENERAL: alert and oriented, no acute distress EYES: PEERL, No injection, discharge or icterus. ENT: Mucous membranes pink and moist. 
NECK: Supple LUNGS: Airway patent. Mildly labored respirations. crackles bases bilaterally HEART: Regular rate and rhythm. Trace pitting edema bilat LE. ABDOMEN: Non-distended and non-tender, without guarding or rebound. SKIN:  warm, dry EXTREMITIES: Without  tenderness or deformity, symmetric with normal ROM 
NEUROLOGICAL: Alert, oriented Diagnostic Study Results Labs - 
 reivewed Radiologic Studies -  
US RETROPERITONEUM COMP Final Result IMPRESSION:   
  
Unremarkable exam  
  
  
  
  
  
  
CT CHEST WO CONT Final Result IMPRESSION:  
Increased bilateral lower lobe atelectasis versus pneumonia. Increased small  
left pleural effusion. Unchanged upper right chest wall soft tissue attenuation  
in the location of the medially resected right clavicle. XR CHEST PORT Final Result  
impression: No acute changes. CT Results  (Last 48 hours) None CXR Results  (Last 48 hours) None Medical Decision Making Mariely Salcedo MD am the first provider for this patient and am the attending of record for this patient encounter. I reviewed the vital signs, available nursing notes, past medical history, past surgical history, family history and social history. Vital Signs-Reviewed the patient's vital signs. No data found. Pulse Oximetry Analysis - 95% on RA 
 
 
EKG interpretation: (Preliminary) Rhythm: paced rhythm  . Rate (approx.): 91;   was interpreted by Bill Boles MD,ED Provider. Records Reviewed: Nursing Notes and Old Medical Records Provider Notes (Medical Decision Making): On presentation, the patient is well appearing, in no acute distress with reassuring vital signs. Based on my history and exam the differential diagnosis for this patient includes CHF, CAP, anemia, UTI, covid, ACS. Basic labs obtained notable for  elevated pro BNP and acute on CKD and chest x-ray on my interpretation increased bilateral lower lobe atelectasis versus pneumonia and small left pleural effusion so patient was started IV ceftriaxone and azithromycin for possible  community-acquired pneumonia as well as lasix. She was started on IV heparin drip for subtherapeutic INR. ED Course:  
Initial assessment performed. The patients presenting problems have been discussed, and they are in agreement with the care plan formulated and outlined with them. I have encouraged them to ask questions as they arise throughout their visit. PROGRESS: 
The patient has been re-evaluated and sx have improved. Reviewed available results with patient and have counseled them on diagnosis and care plan. They have expressed understanding, and all their questions have been answered. They agree with plan for admission.  
 
 
CONSULT: 
 Tony Dover MD spoke with the hospitalist.  Discussed HPI and PE, available diagnostic tests and clinical findings. He is in agreement with care plans as outlined and will evaluate for admission Admit Note Patient is being admitted to the hospitalist.  The results of their tests and reasons for their admission have been discussed with them and/or available family. They convey agreement and understanding for the need to be admitted and for their admission diagnosis. Consultation has been made with the inpatient physician specialist for hospitalization. Disposition: 
admission PLAN: 
1. Admit Diagnosis Clinical Impression: 1. Community acquired pneumonia, unspecified laterality 2. PARAG (acute kidney injury) ([address] 3. Congestive heart failure, unspecified HF chronicity, unspecified heart failure type ([address] 4. Anemia, unspecified type 5. Acute respiratory failure with hypoxia ([address] 6. Stage 3 chronic kidney disease, unspecified whether stage 3a or 3b CKD 7. Anticoagulated 8. Other hypervolemia 9. SOB (shortness of breath) 10. Essential hypertension 11. S/P cardiac pacemaker procedure 12. Sick sinus syndrome ([address] 13. Acute diastolic CHF (congestive heart failure) ([address] Please note that this dictation was completed with Dragon, computer voice recognition software. Quite often unanticipated grammatical, syntax, homophones, and other interpretive errors are inadvertently transcribed by the computer software. Please disregard these errors. Additionally, please excuse any errors that have escaped final proofreading.

## 2020-12-29 PROBLEM — K21.9 GASTROESOPHAGEAL REFLUX DISEASE WITHOUT ESOPHAGITIS: Status: ACTIVE | Noted: 2020-01-01

## 2020-12-29 NOTE — PROGRESS NOTES
Transitional Care Management Progress Note Patient: Flip Hinkle : 1960 PCP: Jennifer Colon MD 
 
Date of office visit: 2020 Date of admission: 20 Date of discharge: 20 Hospital: [facility] Call initiated w/i 2 business dates of discharge: Yes Date of the most recent call to the patient: 20 Assessment/Plan:  
 
Diagnoses and all orders for this visit: 1. Hospital discharge follow-up -     LA DISCHARGE MEDS RECONCILED W/ CURRENT OUTPATIENT MED LIST 2. Pneumonia due to infectious organism, unspecified laterality, unspecified part of lung Resolving. Completed antibiotics in the hospital.  Plan repeat CT chest in 1 month if still having SOB. 3. Chronic systolic congestive heart failure ([facility] Last echo 20: EF 50-55%. Continue Bumex and hydralazine. Follow up with Dr. Norton. -     MAGNESIUM 4. Controlled type 2 diabetes mellitus with stage 4 chronic kidney disease, with long-term current use of insulin ([facility] Presently on diet-control. Will plan to recheck A1c in 3 months. -     MICROALBUMIN, UR, RAND W/ MICROALB/CREAT RATIO 5. Essential hypertension Controlled on carvedilol and hydralazine 100 mg three times daily. 6. Mixed hyperlipidemia On no medication. 
-     LIPID PANEL 7. CKD (chronic kidney disease), stage IV ([facility] Baseline creat 1.5-1.9. On 20 (admission): creat 2.55. On 20 (discharge): creat 2.28.   
-     METABOLIC PANEL, BASIC 
 
8. Iron deficiency anemia, unspecified iron deficiency anemia type Follow up with Dr. Yesenia Lopez as scheduled. -     CBC WITH AUTOMATED DIFF 9. Anemia in stage 4 chronic kidney disease ([facility] [address] -     Refill epoetin viet-epbx (RETACRIT) 10,000 unit/mL injection; 2 mL by SubCUTAneous route Every Tues, Thur & Sat for 30 days. Indications: anemia due to kidney failure 10.  PAF (paroxysmal atrial fibrillation) (HCC) 
-     PROTHROMBIN TIME + INR 
 -     Refill warfarin (Coumadin) 1 mg tablet; Take 2 mg on Mon, Wed, and Fri and 1 mg all other days. 11. Gastroesophageal reflux disease without esophagitis Controlled on pantoprazole and sucralfate. 12. Left below-knee amputee ([address] Have labs checked at [facility] in the next 1-2 days. Follow-up and Dispositions · Return in about 1 month (around 1/29/2021), or if symptoms worsen or fail to improve, for Pneumonia FU, HTN, DM; cancel 1/6/21 appt. Subjective:  
Charbel Sheehan is a 63 y.o. female presenting today for follow-up after hospital discharge. This encounter and supporting documentation was reviewed if available. Medication reconciliation was performed today. The main problem requiring admission was pneumonia and CHF exacerbation. Complications during admission: none She has type 2 DM with CKD stage 4 and polyneuropathy, HTN, hyperlipidemia, paroxysmal atrial fibrillation on chronic warfarin, hx of ablation of a-fib, cardiac pacemaker for SSS, anemia of chronic disease, iron def anemia, hx of breast cancer s/p bilateral mastectomy, left BKA 5/2020, and morbid obesity. Hospitalized at [id number] [facility] from 12/19-12/24/20 for acute respiratory failure due to community-acquired pneumonia and acute on chronic heart failure. Treated with IV antibiotics and diuresed with Bumex IV. CT chest showed bilateral lower lobe pneumonia. Had PARAG on CKD stage 4; seen by Dr. Sindy Escobar, PARAG likely due to ATN from vancomycin or to low BP. US kidneys 12/21/20 unremarkable. Discharged on Bumex 1 mg BID, sucralfate 1 gm 4 times a day, carvedilol 12.5 mg BID, and ferrous sulfate tablets. Lantus insulin stopped due to low BS's. A1c 6.6% on 12/19/20. No complaints today. Reports breathing is much better but not back to baseline. Appetite is good. Denies fatigue, CP, heart palpitations, left leg swelling, orthopnea, PND, polyuria, polydipsia, or hypoglycemia. Has appointment with Dr. Guevara Anguiano on 1/4/21 and with Dr. Mattie Thompson on 2/24/21. Also has appointment with Dr. Carine Wild in Jan for anemia. She needs refill on epoetin (Retacrit). Says she is not taking ferrous sulfate because she does not absorb it; has needed IV iron in the past. 
 
 
Medications marked \"taking\" at this time: 
Prior to Admission medications Medication Sig Start Date End Date Taking? Authorizing Provider  
sucralfate (CARAFATE) 1 gram tablet Take 1 Tab by mouth four (4) times daily for 30 days. 12/24/20 1/23/21 Yes Curly Austin MD  
carvediloL (COREG) 12.5 mg tablet Take 1 Tab by mouth two (2) times daily (with meals) for 30 days. 12/24/20 1/23/21 Yes Curly Austin MD  
bumetanide (BUMEX) 1 mg tablet Take 1 Tab by mouth two (2) times a day for 30 days. 12/24/20 1/23/21 Yes Curly Austin MD  
pantoprazole (PROTONIX) 40 mg tablet Take 1 Tab by mouth Before breakfast and dinner. 12/7/20  Yes Frank Mejia MD  
sertraline (ZOLOFT) 100 mg tablet  10/30/20  Yes Provider, Historical  
warfarin (Coumadin) 1 mg tablet 2 mg M-W-FR and 1 all other days   Yes Provider, Historical  
sertraline (ZOLOFT) 25 mg tablet Take 25 mg by mouth daily. Take with 100 mg tablet every morning 10/6/20  Yes Provider, Historical  
hydrALAZINE (APRESOLINE) 100 mg tablet Take 1 Tab by mouth three (3) times daily. 6/12/20  Yes Beverly Acosta MD  
busPIRone (BUSPAR) 10 mg tablet TAKE ONE TABLET BY MOUTH TWICE A DAY 5/24/19  Yes Beverly Acosta MD  
  
 
Patient Active Problem List  
Diagnosis Code  History of breast cancer Z85.3  Asthma J45.909  Fe deficiency anemia D50.9  Status post ablation of atrial fibrillation Z98.890, Z86.79  
 Sick sinus syndrome (HCC) I49.5  S/P cardiac pacemaker procedure Z95.0  Long term (current) use of anticoagulants Z79.01  
 Mixed hyperlipidemia E78.2  CKD (chronic kidney disease), stage IV ([facility]) N18.4  Systolic murmur U75.8  Essential hypertension I10  
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  
 H/O bilateral mastectomy Z90.13  Left below-knee amputee [facility]) P80.742  S/P atrioventricular rolando ablation Z98.890  Chest pain R07.9  Acute respiratory failure ([facility]) J96.00  
 UTI (urinary tract infection) N39.0  Pneumonia J18.9  
 SOB (shortness of breath) R06.02  
 CKD (chronic kidney disease) N18.9  Anticoagulated Z79.01  
 PARAG (acute kidney injury) ([facility] [address] N17.9  Fluid overload E87.70  
 Subtherapeutic international normalized ratio (INR) R79.1  Suspected COVID-19 virus infection Z20.828  Gastroesophageal reflux disease without esophagitis K21.9 Objective:  
 
Visit Vitals /69 (BP 1 Location: Left arm, BP Patient Position: Sitting) Pulse 90 Temp 97.9 °F (36.6 °C) (Oral) Resp 16 Ht 5' 10\" (1.778 m) Wt 229 lb 3.2 oz (104 kg) SpO2 98% BMI 32.89 kg/m² General: Obese, no distress. HEENT:  Head normocephalic/atraumatic, no scleral icterus Lungs:  Clear to auscultation bilaterally. Good air movement. Heart:  Regular rate and rhythm, normal S1 and S2, no murmur, gallop, or rub Extremities: No clubbing, cyanosis, or edema. Neurological: Alert and oriented. Psychiatric: Normal mood and affect. Behavior is normal.  
 
 
We discussed the expected course, resolution and complications of the diagnosis(es) in detail. Medication risks, benefits, costs, interactions, and alternatives were discussed as indicated. I advised her to contact the office if her condition worsens, changes or fails to improve as anticipated. She expressed understanding with the diagnosis(es) and plan.   
 
Bakari Clemons MD

## 2020-12-29 NOTE — PROGRESS NOTES
Chief Complaint Patient presents with  Transitions Of Care Pt was at [facility] inpatient from 12/19/12/24 for CHF and pneumonia. 1. Have you been to the ER, urgent care clinic since your last visit? Hospitalized since your last visit? Yes When: [facility] 12/19 - 12/24 2. Have you seen or consulted any other health care providers outside of the [address] [place] [address] since your last visit? Include any pap smears or colon screening. No 
 
Visit Vitals /69 (BP 1 Location: Left arm, BP Patient Position: Sitting) Pulse 90 Temp 97.9 °F (36.6 °C) (Oral) Resp 16 Ht 5' 10\" (1.778 m) Wt 229 lb 3.2 oz (104 kg) SpO2 98% BMI 32.89 kg/m²

## 2020-12-30 NOTE — PROGRESS NOTES
Patient was admitted to [facility] on 12/19/2020 and discharged on 12/24/2020 for CHF/CAP. Outreach made within 2 business days of discharge: Yes Top Discharge Challenges to be reviewed by the provider Additional needs identified to be addressed with provider yes Unable to contact patient COVID-19 related testing which was available at this time. Test results were negative. Patient informed of results, if available? no  
Method of communication with provider : chart routing Advance Care Planning:  
Does patient have an Advance Directive:  yes; reviewed and current Inpatient Readmission Risk score:  
Was this a readmission? yes Patients top risk factors for readmission: lack of knowledge about disease, level of motivation and medical condition Interventions to address risk factors: schedule and attend follow up appointments, participate with CTN calls, monitor CHF REHABILITATION [facility] follow up appointment(s):  
Future Appointments Date Time Provider Gómez Lechuga 1/4/2021  1:30 PM Estela Morris MD [facility] BS AMB  
2/8/2021 11:10 AM Luz Izaguirre MD [facility] BS AMB  
2/24/2021  2:00 PM PACEMAKER, [facility] BS AMB  
2/24/2021  2:20 PM Queen Frank Curran MD [facility] BS [facility] follow up appointment(s): PCP 1/6/2021 Plan for follow-up call in 5-7 days based on severity of symptoms and risk factors. CTN provided contact information for future needs.

## 2021-01-01 ENCOUNTER — ANESTHESIA EVENT (OUTPATIENT)
Dept: SURGERY | Age: 61
DRG: 246 | End: 2021-01-01
Payer: COMMERCIAL

## 2021-01-01 ENCOUNTER — APPOINTMENT (OUTPATIENT)
Dept: NON INVASIVE DIAGNOSTICS | Age: 61
DRG: 246 | End: 2021-01-01
Attending: INTERNAL MEDICINE
Payer: COMMERCIAL

## 2021-01-01 ENCOUNTER — APPOINTMENT (OUTPATIENT)
Dept: GENERAL RADIOLOGY | Age: 61
DRG: 246 | End: 2021-01-01
Attending: INTERNAL MEDICINE
Payer: COMMERCIAL

## 2021-01-01 ENCOUNTER — OFFICE VISIT (OUTPATIENT)
Dept: INTERNAL MEDICINE CLINIC | Age: 61
End: 2021-01-01
Payer: COMMERCIAL

## 2021-01-01 ENCOUNTER — OFFICE VISIT (OUTPATIENT)
Dept: CARDIOLOGY CLINIC | Age: 61
End: 2021-01-01
Payer: COMMERCIAL

## 2021-01-01 ENCOUNTER — APPOINTMENT (OUTPATIENT)
Dept: GENERAL RADIOLOGY | Age: 61
DRG: 291 | End: 2021-01-01
Attending: EMERGENCY MEDICINE
Payer: COMMERCIAL

## 2021-01-01 ENCOUNTER — HOSPITAL ENCOUNTER (INPATIENT)
Age: 61
LOS: 11 days | Discharge: NURSING, REHAB OR LONG-TERM CARE | DRG: 291 | End: 2021-03-11
Attending: EMERGENCY MEDICINE | Admitting: INTERNAL MEDICINE
Payer: COMMERCIAL

## 2021-01-01 ENCOUNTER — ANESTHESIA (OUTPATIENT)
Dept: SURGERY | Age: 61
DRG: 246 | End: 2021-01-01
Payer: COMMERCIAL

## 2021-01-01 ENCOUNTER — TELEPHONE (OUTPATIENT)
Dept: CARDIOLOGY CLINIC | Age: 61
End: 2021-01-01

## 2021-01-01 ENCOUNTER — APPOINTMENT (OUTPATIENT)
Dept: NON INVASIVE DIAGNOSTICS | Age: 61
DRG: 291 | End: 2021-01-01
Attending: INTERNAL MEDICINE
Payer: COMMERCIAL

## 2021-01-01 ENCOUNTER — PATIENT OUTREACH (OUTPATIENT)
Dept: CASE MANAGEMENT | Age: 61
End: 2021-01-01

## 2021-01-01 ENCOUNTER — PATIENT MESSAGE (OUTPATIENT)
Dept: INTERNAL MEDICINE CLINIC | Age: 61
End: 2021-01-01

## 2021-01-01 ENCOUNTER — HOSPITAL ENCOUNTER (INPATIENT)
Age: 61
LOS: 16 days | DRG: 246 | End: 2021-04-23
Attending: STUDENT IN AN ORGANIZED HEALTH CARE EDUCATION/TRAINING PROGRAM | Admitting: INTERNAL MEDICINE
Payer: COMMERCIAL

## 2021-01-01 VITALS
OXYGEN SATURATION: 94 % | BODY MASS INDEX: 33.21 KG/M2 | TEMPERATURE: 97.7 F | HEIGHT: 70 IN | WEIGHT: 232 LBS | DIASTOLIC BLOOD PRESSURE: 74 MMHG | SYSTOLIC BLOOD PRESSURE: 148 MMHG | HEART RATE: 91 BPM | RESPIRATION RATE: 18 BRPM

## 2021-01-01 VITALS
RESPIRATION RATE: 20 BRPM | SYSTOLIC BLOOD PRESSURE: 116 MMHG | DIASTOLIC BLOOD PRESSURE: 60 MMHG | OXYGEN SATURATION: 97 % | BODY MASS INDEX: 33.86 KG/M2 | HEIGHT: 70 IN | HEART RATE: 91 BPM | WEIGHT: 236.5 LBS

## 2021-01-01 VITALS
HEART RATE: 90 BPM | HEIGHT: 70 IN | WEIGHT: 264 LBS | TEMPERATURE: 98 F | DIASTOLIC BLOOD PRESSURE: 78 MMHG | BODY MASS INDEX: 37.8 KG/M2 | OXYGEN SATURATION: 97 % | SYSTOLIC BLOOD PRESSURE: 129 MMHG | RESPIRATION RATE: 16 BRPM

## 2021-01-01 VITALS
DIASTOLIC BLOOD PRESSURE: 58 MMHG | OXYGEN SATURATION: 100 % | WEIGHT: 285.72 LBS | SYSTOLIC BLOOD PRESSURE: 113 MMHG | HEIGHT: 70 IN | BODY MASS INDEX: 40.9 KG/M2 | HEART RATE: 90 BPM | TEMPERATURE: 99.5 F

## 2021-01-01 DIAGNOSIS — R57.0 CARDIOGENIC SHOCK (HCC): ICD-10-CM

## 2021-01-01 DIAGNOSIS — Z89.512 LEFT BELOW-KNEE AMPUTEE (HCC): ICD-10-CM

## 2021-01-01 DIAGNOSIS — J20.9 ACUTE BRONCHITIS, UNSPECIFIED ORGANISM: ICD-10-CM

## 2021-01-01 DIAGNOSIS — D62 ACUTE BLOOD LOSS ANEMIA: ICD-10-CM

## 2021-01-01 DIAGNOSIS — Z09 HOSPITAL DISCHARGE FOLLOW-UP: ICD-10-CM

## 2021-01-01 DIAGNOSIS — D63.1 ANEMIA IN STAGE 4 CHRONIC KIDNEY DISEASE (HCC): ICD-10-CM

## 2021-01-01 DIAGNOSIS — I21.4 NSTEMI (NON-ST ELEVATED MYOCARDIAL INFARCTION) (HCC): ICD-10-CM

## 2021-01-01 DIAGNOSIS — I10 ESSENTIAL HYPERTENSION: Primary | ICD-10-CM

## 2021-01-01 DIAGNOSIS — N18.4 CKD (CHRONIC KIDNEY DISEASE), STAGE IV (HCC): ICD-10-CM

## 2021-01-01 DIAGNOSIS — Z98.890 S/P ATRIOVENTRICULAR NODAL ABLATION: ICD-10-CM

## 2021-01-01 DIAGNOSIS — J96.01 ACUTE RESPIRATORY FAILURE WITH HYPOXIA (HCC): ICD-10-CM

## 2021-01-01 DIAGNOSIS — E78.2 MIXED HYPERLIPIDEMIA: ICD-10-CM

## 2021-01-01 DIAGNOSIS — Z95.0 S/P CARDIAC PACEMAKER PROCEDURE: ICD-10-CM

## 2021-01-01 DIAGNOSIS — I48.0 PAF (PAROXYSMAL ATRIAL FIBRILLATION) (HCC): ICD-10-CM

## 2021-01-01 DIAGNOSIS — I10 ESSENTIAL HYPERTENSION: ICD-10-CM

## 2021-01-01 DIAGNOSIS — I49.5 SICK SINUS SYNDROME (HCC): ICD-10-CM

## 2021-01-01 DIAGNOSIS — I50.21 ACUTE SYSTOLIC CONGESTIVE HEART FAILURE (HCC): ICD-10-CM

## 2021-01-01 DIAGNOSIS — E11.22 CONTROLLED TYPE 2 DIABETES MELLITUS WITH STAGE 4 CHRONIC KIDNEY DISEASE, WITH LONG-TERM CURRENT USE OF INSULIN (HCC): ICD-10-CM

## 2021-01-01 DIAGNOSIS — I25.119 CORONARY ARTERY DISEASE INVOLVING NATIVE CORONARY ARTERY WITH ANGINA PECTORIS, UNSPECIFIED WHETHER NATIVE OR TRANSPLANTED HEART (HCC): ICD-10-CM

## 2021-01-01 DIAGNOSIS — I48.0 PAF (PAROXYSMAL ATRIAL FIBRILLATION) (HCC): Primary | ICD-10-CM

## 2021-01-01 DIAGNOSIS — N17.9 AKI (ACUTE KIDNEY INJURY) (HCC): ICD-10-CM

## 2021-01-01 DIAGNOSIS — N18.4 ANEMIA IN STAGE 4 CHRONIC KIDNEY DISEASE (HCC): ICD-10-CM

## 2021-01-01 DIAGNOSIS — N18.4 CONTROLLED TYPE 2 DIABETES MELLITUS WITH STAGE 4 CHRONIC KIDNEY DISEASE, UNSPECIFIED WHETHER LONG TERM INSULIN USE (HCC): ICD-10-CM

## 2021-01-01 DIAGNOSIS — E66.01 MORBID OBESITY WITH BMI OF 40.0-44.9, ADULT (HCC): ICD-10-CM

## 2021-01-01 DIAGNOSIS — Z86.79 STATUS POST ABLATION OF ATRIAL FIBRILLATION: Chronic | ICD-10-CM

## 2021-01-01 DIAGNOSIS — I34.0 MITRAL VALVE INSUFFICIENCY, UNSPECIFIED ETIOLOGY: ICD-10-CM

## 2021-01-01 DIAGNOSIS — Z79.4 CONTROLLED TYPE 2 DIABETES MELLITUS WITH STAGE 4 CHRONIC KIDNEY DISEASE, WITH LONG-TERM CURRENT USE OF INSULIN (HCC): ICD-10-CM

## 2021-01-01 DIAGNOSIS — I50.33 DIASTOLIC CHF, ACUTE ON CHRONIC (HCC): ICD-10-CM

## 2021-01-01 DIAGNOSIS — I25.10 CAD, MULTIPLE VESSEL: ICD-10-CM

## 2021-01-01 DIAGNOSIS — Z98.890 STATUS POST ABLATION OF ATRIAL FIBRILLATION: Chronic | ICD-10-CM

## 2021-01-01 DIAGNOSIS — E11.22 CONTROLLED TYPE 2 DIABETES MELLITUS WITH STAGE 4 CHRONIC KIDNEY DISEASE, UNSPECIFIED WHETHER LONG TERM INSULIN USE (HCC): ICD-10-CM

## 2021-01-01 DIAGNOSIS — I50.22 CHRONIC SYSTOLIC CONGESTIVE HEART FAILURE (HCC): ICD-10-CM

## 2021-01-01 DIAGNOSIS — I50.43 ACUTE ON CHRONIC COMBINED SYSTOLIC AND DIASTOLIC CONGESTIVE HEART FAILURE (HCC): Primary | ICD-10-CM

## 2021-01-01 DIAGNOSIS — N18.4 CONTROLLED TYPE 2 DIABETES MELLITUS WITH STAGE 4 CHRONIC KIDNEY DISEASE, WITH LONG-TERM CURRENT USE OF INSULIN (HCC): ICD-10-CM

## 2021-01-01 DIAGNOSIS — Z79.01 CHRONIC ANTICOAGULATION: ICD-10-CM

## 2021-01-01 DIAGNOSIS — Z71.89 DNR (DO NOT RESUSCITATE) DISCUSSION: ICD-10-CM

## 2021-01-01 DIAGNOSIS — Z79.01 ANTICOAGULATED: ICD-10-CM

## 2021-01-01 LAB
ABO + RH BLD: NORMAL
ACT BLD: 648 SECS (ref 79–138)
ACT BLD: 75 SECS (ref 79–138)
ALBUMIN SERPL-MCNC: 1.8 G/DL (ref 3.5–5)
ALBUMIN SERPL-MCNC: 1.9 G/DL (ref 3.5–5)
ALBUMIN SERPL-MCNC: 2 G/DL (ref 3.5–5)
ALBUMIN SERPL-MCNC: 2.1 G/DL (ref 3.5–5)
ALBUMIN SERPL-MCNC: 2.1 G/DL (ref 3.5–5)
ALBUMIN SERPL-MCNC: 2.3 G/DL (ref 3.5–5)
ALBUMIN SERPL-MCNC: 2.3 G/DL (ref 3.5–5)
ALBUMIN SERPL-MCNC: 2.4 G/DL (ref 3.5–5)
ALBUMIN SERPL-MCNC: 2.4 G/DL (ref 3.5–5)
ALBUMIN SERPL-MCNC: 2.5 G/DL (ref 3.5–5)
ALBUMIN SERPL-MCNC: 2.5 G/DL (ref 3.5–5)
ALBUMIN SERPL-MCNC: 2.8 G/DL (ref 3.5–5)
ALBUMIN SERPL-MCNC: 2.9 G/DL (ref 3.5–5)
ALBUMIN SERPL-MCNC: 3 G/DL (ref 3.5–5)
ALBUMIN SERPL-MCNC: 3.4 G/DL (ref 3.5–5)
ALBUMIN/GLOB SERPL: 0.7 {RATIO} (ref 1.1–2.2)
ALBUMIN/GLOB SERPL: 0.8 {RATIO} (ref 1.1–2.2)
ALBUMIN/GLOB SERPL: 0.9 {RATIO} (ref 1.1–2.2)
ALBUMIN/GLOB SERPL: 0.9 {RATIO} (ref 1.1–2.2)
ALBUMIN/GLOB SERPL: 1 {RATIO} (ref 1.1–2.2)
ALBUMIN/GLOB SERPL: 1.1 {RATIO} (ref 1.1–2.2)
ALP SERPL-CCNC: 41 U/L (ref 45–117)
ALP SERPL-CCNC: 46 U/L (ref 45–117)
ALP SERPL-CCNC: 46 U/L (ref 45–117)
ALP SERPL-CCNC: 61 U/L (ref 45–117)
ALP SERPL-CCNC: 64 U/L (ref 45–117)
ALP SERPL-CCNC: 69 U/L (ref 45–117)
ALP SERPL-CCNC: 71 U/L (ref 45–117)
ALP SERPL-CCNC: 73 U/L (ref 45–117)
ALP SERPL-CCNC: 74 U/L (ref 45–117)
ALP SERPL-CCNC: 76 U/L (ref 45–117)
ALP SERPL-CCNC: 85 U/L (ref 45–117)
ALP SERPL-CCNC: 87 U/L (ref 45–117)
ALP SERPL-CCNC: 88 U/L (ref 45–117)
ALP SERPL-CCNC: 96 U/L (ref 45–117)
ALP SERPL-CCNC: 96 U/L (ref 45–117)
ALT SERPL-CCNC: 11 U/L (ref 12–78)
ALT SERPL-CCNC: 14 U/L (ref 12–78)
ALT SERPL-CCNC: 14 U/L (ref 12–78)
ALT SERPL-CCNC: 16 U/L (ref 12–78)
ALT SERPL-CCNC: 21 U/L (ref 12–78)
ALT SERPL-CCNC: 30 U/L (ref 12–78)
ALT SERPL-CCNC: 38 U/L (ref 12–78)
ALT SERPL-CCNC: 6 U/L (ref 12–78)
ALT SERPL-CCNC: 7 U/L (ref 12–78)
ALT SERPL-CCNC: 7 U/L (ref 12–78)
ALT SERPL-CCNC: 8 U/L (ref 12–78)
ALT SERPL-CCNC: 9 U/L (ref 12–78)
ALT SERPL-CCNC: <6 U/L (ref 12–78)
ANION GAP SERPL CALC-SCNC: 10 MMOL/L (ref 5–15)
ANION GAP SERPL CALC-SCNC: 11 MMOL/L (ref 5–15)
ANION GAP SERPL CALC-SCNC: 12 MMOL/L (ref 5–15)
ANION GAP SERPL CALC-SCNC: 12 MMOL/L (ref 5–15)
ANION GAP SERPL CALC-SCNC: 6 MMOL/L (ref 5–15)
ANION GAP SERPL CALC-SCNC: 7 MMOL/L (ref 5–15)
ANION GAP SERPL CALC-SCNC: 8 MMOL/L (ref 5–15)
ANION GAP SERPL CALC-SCNC: 9 MMOL/L (ref 5–15)
APPEARANCE UR: CLEAR
APTT PPP: 29 SEC (ref 22.1–31)
APTT PPP: 39.2 SEC (ref 22.1–31)
APTT PPP: 40.3 SEC (ref 22.1–31)
APTT PPP: 54.2 SEC (ref 22.1–31)
APTT PPP: 54.4 SEC (ref 22.1–31)
APTT PPP: 58.7 SEC (ref 22.1–31)
APTT PPP: 64 SEC (ref 22.1–31)
APTT PPP: 65.2 SEC (ref 22.1–31)
APTT PPP: 67.4 SEC (ref 22.1–31)
APTT PPP: 67.7 SEC (ref 22.1–31)
APTT PPP: 85.1 SEC (ref 22.1–31)
APTT PPP: 94.3 SEC (ref 22.1–31)
ARTERIAL PATENCY WRIST A: ABNORMAL
ARTERIAL PATENCY WRIST A: ABNORMAL
AST SERPL-CCNC: 10 U/L (ref 15–37)
AST SERPL-CCNC: 11 U/L (ref 15–37)
AST SERPL-CCNC: 11 U/L (ref 15–37)
AST SERPL-CCNC: 12 U/L (ref 15–37)
AST SERPL-CCNC: 13 U/L (ref 15–37)
AST SERPL-CCNC: 13 U/L (ref 15–37)
AST SERPL-CCNC: 14 U/L (ref 15–37)
AST SERPL-CCNC: 20 U/L (ref 15–37)
AST SERPL-CCNC: 32 U/L (ref 15–37)
AST SERPL-CCNC: 6 U/L (ref 15–37)
AST SERPL-CCNC: 65 U/L (ref 15–37)
AST SERPL-CCNC: 8 U/L (ref 15–37)
ATRIAL RATE: 82 BPM
ATRIAL RATE: 87 BPM
ATRIAL RATE: 89 BPM
B PERT DNA SPEC QL NAA+PROBE: NOT DETECTED
BACTERIA SPEC CULT: ABNORMAL
BACTERIA SPEC CULT: NORMAL
BACTERIA URNS QL MICRO: NEGATIVE /HPF
BASE DEFICIT BLDA-SCNC: 7.9 MMOL/L
BASE DEFICIT BLDA-SCNC: 8 MMOL/L
BASE DEFICIT BLDV-SCNC: 7.6 MMOL/L
BASOPHILS # BLD: 0 K/UL (ref 0–0.1)
BASOPHILS # BLD: 0.1 K/UL (ref 0–0.1)
BASOPHILS # BLD: 0.1 K/UL (ref 0–0.1)
BASOPHILS NFR BLD: 0 % (ref 0–1)
BASOPHILS NFR BLD: 1 % (ref 0–1)
BASOPHILS NFR BLD: 1 % (ref 0–1)
BDY SITE: ABNORMAL
BILIRUB DIRECT SERPL-MCNC: 0.8 MG/DL (ref 0–0.2)
BILIRUB DIRECT SERPL-MCNC: 0.8 MG/DL (ref 0–0.2)
BILIRUB DIRECT SERPL-MCNC: 0.9 MG/DL (ref 0–0.2)
BILIRUB DIRECT SERPL-MCNC: 1 MG/DL (ref 0–0.2)
BILIRUB DIRECT SERPL-MCNC: 1.1 MG/DL (ref 0–0.2)
BILIRUB DIRECT SERPL-MCNC: 1.2 MG/DL (ref 0–0.2)
BILIRUB DIRECT SERPL-MCNC: 1.2 MG/DL (ref 0–0.2)
BILIRUB DIRECT SERPL-MCNC: 1.4 MG/DL (ref 0–0.2)
BILIRUB DIRECT SERPL-MCNC: 1.4 MG/DL (ref 0–0.2)
BILIRUB DIRECT SERPL-MCNC: 1.5 MG/DL (ref 0–0.2)
BILIRUB DIRECT SERPL-MCNC: 1.5 MG/DL (ref 0–0.2)
BILIRUB SERPL-MCNC: 0.8 MG/DL (ref 0.2–1)
BILIRUB SERPL-MCNC: 1.2 MG/DL (ref 0.2–1)
BILIRUB SERPL-MCNC: 1.4 MG/DL (ref 0.2–1)
BILIRUB SERPL-MCNC: 1.5 MG/DL (ref 0.2–1)
BILIRUB SERPL-MCNC: 1.5 MG/DL (ref 0.2–1)
BILIRUB SERPL-MCNC: 1.6 MG/DL (ref 0.2–1)
BILIRUB SERPL-MCNC: 1.6 MG/DL (ref 0.2–1)
BILIRUB SERPL-MCNC: 1.7 MG/DL (ref 0.2–1)
BILIRUB SERPL-MCNC: 1.8 MG/DL (ref 0.2–1)
BILIRUB SERPL-MCNC: 1.9 MG/DL (ref 0.2–1)
BILIRUB SERPL-MCNC: 2 MG/DL (ref 0.2–1)
BILIRUB UR QL: NEGATIVE
BLD PROD TYP BPU: NORMAL
BLOOD GROUP ANTIBODIES SERPL: NORMAL
BNP SERPL-MCNC: ABNORMAL PG/ML
BNP SERPL-MCNC: ABNORMAL PG/ML
BORDETELLA PARAPERTUSSIS PCR, BORPAR: NOT DETECTED
BPU ID: NORMAL
BREATHS.SPONTANEOUS ON VENT: 18
BREATHS.SPONTANEOUS ON VENT: 24
BUN SERPL-MCNC: 22 MG/DL (ref 6–20)
BUN SERPL-MCNC: 22 MG/DL (ref 6–20)
BUN SERPL-MCNC: 26 MG/DL (ref 6–20)
BUN SERPL-MCNC: 26 MG/DL (ref 6–20)
BUN SERPL-MCNC: 28 MG/DL (ref 6–20)
BUN SERPL-MCNC: 30 MG/DL (ref 6–20)
BUN SERPL-MCNC: 33 MG/DL (ref 6–20)
BUN SERPL-MCNC: 34 MG/DL (ref 6–20)
BUN SERPL-MCNC: 35 MG/DL (ref 6–20)
BUN SERPL-MCNC: 35 MG/DL (ref 6–20)
BUN SERPL-MCNC: 36 MG/DL (ref 6–20)
BUN SERPL-MCNC: 37 MG/DL (ref 6–20)
BUN SERPL-MCNC: 40 MG/DL (ref 6–20)
BUN SERPL-MCNC: 41 MG/DL (ref 6–20)
BUN SERPL-MCNC: 42 MG/DL (ref 6–20)
BUN SERPL-MCNC: 44 MG/DL (ref 6–20)
BUN SERPL-MCNC: 45 MG/DL (ref 6–20)
BUN SERPL-MCNC: 47 MG/DL (ref 6–20)
BUN/CREAT SERPL: 11 (ref 12–20)
BUN/CREAT SERPL: 11 (ref 12–20)
BUN/CREAT SERPL: 12 (ref 12–20)
BUN/CREAT SERPL: 13 (ref 12–20)
BUN/CREAT SERPL: 14 (ref 12–20)
BUN/CREAT SERPL: 15 (ref 12–20)
BUN/CREAT SERPL: 16 (ref 12–20)
BUN/CREAT SERPL: 17 (ref 12–20)
BUN/CREAT SERPL: 17 (ref 12–20)
C PNEUM DNA SPEC QL NAA+PROBE: NOT DETECTED
CALCIUM SERPL-MCNC: 7.7 MG/DL (ref 8.5–10.1)
CALCIUM SERPL-MCNC: 7.9 MG/DL (ref 8.5–10.1)
CALCIUM SERPL-MCNC: 8 MG/DL (ref 8.5–10.1)
CALCIUM SERPL-MCNC: 8.1 MG/DL (ref 8.5–10.1)
CALCIUM SERPL-MCNC: 8.1 MG/DL (ref 8.5–10.1)
CALCIUM SERPL-MCNC: 8.4 MG/DL (ref 8.5–10.1)
CALCIUM SERPL-MCNC: 8.5 MG/DL (ref 8.5–10.1)
CALCIUM SERPL-MCNC: 8.6 MG/DL (ref 8.5–10.1)
CALCIUM SERPL-MCNC: 8.7 MG/DL (ref 8.5–10.1)
CALCIUM SERPL-MCNC: 8.8 MG/DL (ref 8.5–10.1)
CALCIUM SERPL-MCNC: 8.9 MG/DL (ref 8.5–10.1)
CALCIUM SERPL-MCNC: 9.1 MG/DL (ref 8.5–10.1)
CALCIUM SERPL-MCNC: 9.1 MG/DL (ref 8.5–10.1)
CALCIUM SERPL-MCNC: 9.2 MG/DL (ref 8.5–10.1)
CALCULATED R AXIS, ECG10: -120 DEGREES
CALCULATED R AXIS, ECG10: -156 DEGREES
CALCULATED R AXIS, ECG10: 116 DEGREES
CALCULATED T AXIS, ECG11: 75 DEGREES
CALCULATED T AXIS, ECG11: 87 DEGREES
CALCULATED T AXIS, ECG11: 88 DEGREES
CHLORIDE SERPL-SCNC: 100 MMOL/L (ref 97–108)
CHLORIDE SERPL-SCNC: 100 MMOL/L (ref 97–108)
CHLORIDE SERPL-SCNC: 101 MMOL/L (ref 97–108)
CHLORIDE SERPL-SCNC: 101 MMOL/L (ref 97–108)
CHLORIDE SERPL-SCNC: 102 MMOL/L (ref 97–108)
CHLORIDE SERPL-SCNC: 102 MMOL/L (ref 97–108)
CHLORIDE SERPL-SCNC: 103 MMOL/L (ref 97–108)
CHLORIDE SERPL-SCNC: 104 MMOL/L (ref 97–108)
CHLORIDE SERPL-SCNC: 105 MMOL/L (ref 97–108)
CHLORIDE SERPL-SCNC: 105 MMOL/L (ref 97–108)
CHLORIDE SERPL-SCNC: 106 MMOL/L (ref 97–108)
CHLORIDE SERPL-SCNC: 106 MMOL/L (ref 97–108)
CHLORIDE SERPL-SCNC: 108 MMOL/L (ref 97–108)
CHLORIDE SERPL-SCNC: 108 MMOL/L (ref 97–108)
CHLORIDE SERPL-SCNC: 113 MMOL/L (ref 97–108)
CHLORIDE SERPL-SCNC: 114 MMOL/L (ref 97–108)
CHLORIDE SERPL-SCNC: 115 MMOL/L (ref 97–108)
CHOLEST SERPL-MCNC: 79 MG/DL
CK SERPL-CCNC: 40 U/L (ref 26–192)
CO2 SERPL-SCNC: 15 MMOL/L (ref 21–32)
CO2 SERPL-SCNC: 16 MMOL/L (ref 21–32)
CO2 SERPL-SCNC: 17 MMOL/L (ref 21–32)
CO2 SERPL-SCNC: 18 MMOL/L (ref 21–32)
CO2 SERPL-SCNC: 19 MMOL/L (ref 21–32)
CO2 SERPL-SCNC: 20 MMOL/L (ref 21–32)
CO2 SERPL-SCNC: 21 MMOL/L (ref 21–32)
CO2 SERPL-SCNC: 22 MMOL/L (ref 21–32)
CO2 SERPL-SCNC: 24 MMOL/L (ref 21–32)
CO2 SERPL-SCNC: 25 MMOL/L (ref 21–32)
CO2 SERPL-SCNC: 26 MMOL/L (ref 21–32)
CO2 SERPL-SCNC: 26 MMOL/L (ref 21–32)
CO2 SERPL-SCNC: 28 MMOL/L (ref 21–32)
CO2 SERPL-SCNC: 31 MMOL/L (ref 21–32)
COHGB MFR BLD: 0.3 % (ref 1–2)
COHGB MFR BLD: 0.3 % (ref 1–2)
COHGB MFR BLD: 0.6 % (ref 1–2)
COHGB MFR BLD: 0.7 % (ref 1–2)
COHGB MFR BLD: 0.9 % (ref 1–2)
COHGB MFR BLD: 1 % (ref 1–2)
COLOR UR: ABNORMAL
COMMENT, HOLDF: NORMAL
COMMENT, HOLDF: NORMAL
COVID-19 RAPID TEST, COVR: NOT DETECTED
CREAT SERPL-MCNC: 1.96 MG/DL (ref 0.55–1.02)
CREAT SERPL-MCNC: 1.97 MG/DL (ref 0.55–1.02)
CREAT SERPL-MCNC: 2.1 MG/DL (ref 0.55–1.02)
CREAT SERPL-MCNC: 2.12 MG/DL (ref 0.55–1.02)
CREAT SERPL-MCNC: 2.15 MG/DL (ref 0.55–1.02)
CREAT SERPL-MCNC: 2.19 MG/DL (ref 0.55–1.02)
CREAT SERPL-MCNC: 2.2 MG/DL (ref 0.55–1.02)
CREAT SERPL-MCNC: 2.21 MG/DL (ref 0.55–1.02)
CREAT SERPL-MCNC: 2.22 MG/DL (ref 0.55–1.02)
CREAT SERPL-MCNC: 2.23 MG/DL (ref 0.55–1.02)
CREAT SERPL-MCNC: 2.29 MG/DL (ref 0.55–1.02)
CREAT SERPL-MCNC: 2.42 MG/DL (ref 0.55–1.02)
CREAT SERPL-MCNC: 2.43 MG/DL (ref 0.55–1.02)
CREAT SERPL-MCNC: 2.44 MG/DL (ref 0.55–1.02)
CREAT SERPL-MCNC: 2.47 MG/DL (ref 0.55–1.02)
CREAT SERPL-MCNC: 2.48 MG/DL (ref 0.55–1.02)
CREAT SERPL-MCNC: 2.55 MG/DL (ref 0.55–1.02)
CREAT SERPL-MCNC: 2.7 MG/DL (ref 0.55–1.02)
CREAT SERPL-MCNC: 2.75 MG/DL (ref 0.55–1.02)
CREAT SERPL-MCNC: 2.76 MG/DL (ref 0.55–1.02)
CREAT SERPL-MCNC: 2.76 MG/DL (ref 0.55–1.02)
CREAT SERPL-MCNC: 2.81 MG/DL (ref 0.55–1.02)
CREAT SERPL-MCNC: 2.87 MG/DL (ref 0.55–1.02)
CREAT SERPL-MCNC: 2.91 MG/DL (ref 0.55–1.02)
CREAT SERPL-MCNC: 2.94 MG/DL (ref 0.55–1.02)
CREAT SERPL-MCNC: 3.03 MG/DL (ref 0.55–1.02)
CREAT SERPL-MCNC: 3.12 MG/DL (ref 0.55–1.02)
CROSSMATCH RESULT,%XM: NORMAL
DIAGNOSIS, 93000: NORMAL
DIFFERENTIAL METHOD BLD: ABNORMAL
ECHO AO ASC DIAM: 2.75 CM
ECHO AO ROOT DIAM: 2.67 CM
ECHO AO ROOT DIAM: 2.8 CM
ECHO AO ROOT DIAM: 2.89 CM
ECHO AV AREA PEAK VELOCITY: 0.8 CM2
ECHO AV AREA PEAK VELOCITY: 0.81 CM2
ECHO AV AREA PEAK VELOCITY: 0.85 CM2
ECHO AV AREA PEAK VELOCITY: 0.86 CM2
ECHO AV AREA PEAK VELOCITY: 1.67 CM2
ECHO AV AREA PEAK VELOCITY: 1.67 CM2
ECHO AV AREA VTI: 0.67 CM2
ECHO AV AREA VTI: 1.69 CM2
ECHO AV AREA/BSA PEAK VELOCITY: 0.7 CM2/M2
ECHO AV AREA/BSA PEAK VELOCITY: 0.7 CM2/M2
ECHO AV AREA/BSA VTI: 0.3 CM2/M2
ECHO AV AREA/BSA VTI: 0.7 CM2/M2
ECHO AV MEAN GRADIENT: 19.31 MMHG
ECHO AV MEAN GRADIENT: 2.2 MMHG
ECHO AV PEAK GRADIENT: 27.84 MMHG
ECHO AV PEAK GRADIENT: 28.28 MMHG
ECHO AV PEAK GRADIENT: 4.62 MMHG
ECHO AV PEAK GRADIENT: 6.37 MMHG
ECHO AV PEAK VELOCITY: 107.49 CM/S
ECHO AV PEAK VELOCITY: 126.21 CM/S
ECHO AV PEAK VELOCITY: 247.56 CM/S
ECHO AV PEAK VELOCITY: 250.91 CM/S
ECHO AV VTI: 15.84 CM
ECHO AV VTI: 50.53 CM
ECHO EST RA PRESSURE: 10 MMHG
ECHO EST RA PRESSURE: 10 MMHG
ECHO EST RA PRESSURE: 8 MMHG
ECHO EST RA PRESSURE: 8 MMHG
ECHO LA AREA 4C: 24.68 CM2
ECHO LA AREA 4C: 27.11 CM2
ECHO LA MAJOR AXIS: 3.72 CM
ECHO LA MAJOR AXIS: 4.42 CM
ECHO LA MAJOR AXIS: 4.48 CM
ECHO LA MINOR AXIS: 1.63 CM
ECHO LA MINOR AXIS: 1.87 CM
ECHO LA MINOR AXIS: 1.93 CM
ECHO LA VOL 2C: 63.04 ML (ref 22–52)
ECHO LA VOL 4C: 105.84 ML (ref 22–52)
ECHO LA VOL 4C: 82.21 ML (ref 22–52)
ECHO LA VOL BP: 76.02 ML (ref 22–52)
ECHO LA VOL/BSA BIPLANE: 33.24 ML/M2 (ref 16–28)
ECHO LA VOLUME INDEX A2C: 27.56 ML/M2 (ref 16–28)
ECHO LA VOLUME INDEX A4C: 35.94 ML/M2 (ref 16–28)
ECHO LA VOLUME INDEX A4C: 44.25 ML/M2 (ref 16–28)
ECHO LV E' LATERAL VELOCITY: 9.77 CM/S
ECHO LV E' SEPTAL VELOCITY: 7.95 CM/S
ECHO LV EDV A2C: 142.35 ML
ECHO LV EDV A4C: 113.92 ML
ECHO LV EDV A4C: 76.25 ML
ECHO LV EDV BP: 127.25 ML (ref 56–104)
ECHO LV EDV INDEX A4C: 31.9 ML/M2
ECHO LV EDV INDEX A4C: 49.8 ML/M2
ECHO LV EDV INDEX BP: 55.6 ML/M2
ECHO LV EDV NDEX A2C: 62.2 ML/M2
ECHO LV EJECTION FRACTION A2C: 24 PERCENT
ECHO LV EJECTION FRACTION A4C: 17 PERCENT
ECHO LV EJECTION FRACTION A4C: 37 PERCENT
ECHO LV EJECTION FRACTION BIPLANE: 19.6 PERCENT (ref 55–100)
ECHO LV ESV A2C: 108.77 ML
ECHO LV ESV A4C: 48.1 ML
ECHO LV ESV A4C: 94.32 ML
ECHO LV ESV BP: 102.36 ML (ref 19–49)
ECHO LV ESV INDEX A2C: 47.6 ML/M2
ECHO LV ESV INDEX A4C: 20.1 ML/M2
ECHO LV ESV INDEX A4C: 41.2 ML/M2
ECHO LV ESV INDEX BP: 44.8 ML/M2
ECHO LV INTERNAL DIMENSION DIASTOLIC: 4.13 CM (ref 3.9–5.3)
ECHO LV INTERNAL DIMENSION DIASTOLIC: 4.13 CM (ref 3.9–5.3)
ECHO LV INTERNAL DIMENSION DIASTOLIC: 4.5 CM (ref 3.9–5.3)
ECHO LV INTERNAL DIMENSION SYSTOLIC: 3.42 CM
ECHO LV INTERNAL DIMENSION SYSTOLIC: 3.54 CM
ECHO LV INTERNAL DIMENSION SYSTOLIC: 3.86 CM
ECHO LV IVSD: 1.41 CM (ref 0.6–0.9)
ECHO LV IVSD: 1.54 CM (ref 0.6–0.9)
ECHO LV IVSD: 1.68 CM (ref 0.6–0.9)
ECHO LV IVSS: 1.75 CM
ECHO LV MASS 2D: 225.1 G (ref 67–162)
ECHO LV MASS 2D: 247.4 G (ref 67–162)
ECHO LV MASS 2D: 305.8 G (ref 67–162)
ECHO LV MASS INDEX 2D: 108.7 G/M2 (ref 43–95)
ECHO LV MASS INDEX 2D: 133.7 G/M2 (ref 43–95)
ECHO LV MASS INDEX 2D: 94.1 G/M2 (ref 43–95)
ECHO LV POSTERIOR WALL DIASTOLIC: 1.21 CM (ref 0.6–0.9)
ECHO LV POSTERIOR WALL DIASTOLIC: 1.49 CM (ref 0.6–0.9)
ECHO LV POSTERIOR WALL DIASTOLIC: 1.78 CM (ref 0.6–0.9)
ECHO LV POSTERIOR WALL SYSTOLIC: 1.84 CM
ECHO LVOT CARDIAC OUTPUT: 2.3 LITER/MINUTE
ECHO LVOT CARDIAC OUTPUT: 2.95 LITER/MINUTE
ECHO LVOT DIAM: 1.66 CM
ECHO LVOT DIAM: 1.98 CM
ECHO LVOT DIAM: 2.01 CM
ECHO LVOT DIAM: 2.06 CM
ECHO LVOT PEAK GRADIENT: 1.35 MMHG
ECHO LVOT PEAK GRADIENT: 1.61 MMHG
ECHO LVOT PEAK GRADIENT: 3.45 MMHG
ECHO LVOT PEAK GRADIENT: 3.91 MMHG
ECHO LVOT PEAK VELOCITY: 58.18 CM/S
ECHO LVOT PEAK VELOCITY: 63.4 CM/S
ECHO LVOT PEAK VELOCITY: 92.83 CM/S
ECHO LVOT PEAK VELOCITY: 98.86 CM/S
ECHO LVOT SV: 26.8 ML
ECHO LVOT SV: 33.7 ML
ECHO LVOT VTI: 15.56 CM
ECHO LVOT VTI: 8.68 CM
ECHO MV A VELOCITY: 0.34 CM/S
ECHO MV AREA PHT: 2.96 CM2
ECHO MV AREA PHT: 3.96 CM2
ECHO MV AREA PHT: 3.98 CM2
ECHO MV AREA PHT: 5.33 CM2
ECHO MV AREA VTI: 1.23 CM2
ECHO MV E DECELERATION TIME (DT): 123.17 MS
ECHO MV E DECELERATION TIME (DT): 222.28 MS
ECHO MV E VELOCITY: 133.27 CM/S
ECHO MV E VELOCITY: 97.28 CM/S
ECHO MV E/A RATIO: 286.12
ECHO MV EROA PISA: 0.31 CM2
ECHO MV MAX VELOCITY: 134.06 CM/S
ECHO MV MAX VELOCITY: 138.51 CM/S
ECHO MV MEAN GRADIENT: 2.59 MMHG
ECHO MV MEAN GRADIENT: 3.34 MMHG
ECHO MV PEAK GRADIENT: 7.19 MMHG
ECHO MV PEAK GRADIENT: 7.67 MMHG
ECHO MV PRESSURE HALF TIME (PHT): 41.25 MS
ECHO MV PRESSURE HALF TIME (PHT): 55.32 MS
ECHO MV PRESSURE HALF TIME (PHT): 55.49 MS
ECHO MV PRESSURE HALF TIME (PHT): 74.37 MS
ECHO MV REGURGITANT RADIUS PISA: 0.81 CM
ECHO MV REGURGITANT VOLUME: 42.98 ML
ECHO MV REGURGITANT VTIA: 140.58 CM
ECHO MV VTI: 21.82 CM
ECHO MV VTI: 28.01 CM
ECHO PV MAX VELOCITY: 162.9 CM/S
ECHO PV MAX VELOCITY: 164.68 CM/S
ECHO PV MAX VELOCITY: 65.3 CM/S
ECHO PV MAX VELOCITY: 67.92 CM/S
ECHO PV MAX VELOCITY: 87.51 CM/S
ECHO PV MEAN GRADIENT: 3.89 MMHG
ECHO PV PEAK INSTANTANEOUS GRADIENT SYSTOLIC: 1.71 MMHG
ECHO PV PEAK INSTANTANEOUS GRADIENT SYSTOLIC: 1.85 MMHG
ECHO PV PEAK INSTANTANEOUS GRADIENT SYSTOLIC: 10.61 MMHG
ECHO PV PEAK INSTANTANEOUS GRADIENT SYSTOLIC: 10.85 MMHG
ECHO PV PEAK INSTANTANEOUS GRADIENT SYSTOLIC: 3.08 MMHG
ECHO PV REGURGITANT MAX VELOCITY: 157.26 CM/S
ECHO PV REGURGITANT MAX VELOCITY: 219.15 CM/S
ECHO PV VTI: 18.21 CM
ECHO RIGHT VENTRICULAR SYSTOLIC PRESSURE (RVSP): 38.13 MMHG
ECHO RIGHT VENTRICULAR SYSTOLIC PRESSURE (RVSP): 40.35 MMHG
ECHO RIGHT VENTRICULAR SYSTOLIC PRESSURE (RVSP): 46.85 MMHG
ECHO RIGHT VENTRICULAR SYSTOLIC PRESSURE (RVSP): 48.22 MMHG
ECHO TV REGURGITANT MAX VELOCITY: 272.27 CM/S
ECHO TV REGURGITANT MAX VELOCITY: 284.16 CM/S
ECHO TV REGURGITANT MAX VELOCITY: 303.48 CM/S
ECHO TV REGURGITANT MAX VELOCITY: 303.74 CM/S
ECHO TV REGURGITANT MAX VELOCITY: 315.68 CM/S
ECHO TV REGURGITANT MAX VELOCITY: 389.24 CM/S
ECHO TV REGURGITANT MAX VELOCITY: 428.4 CM/S
ECHO TV REGURGITANT MAX VELOCITY: 429.38 CM/S
ECHO TV REGURGITANT PEAK GRADIENT: 30.13 MMHG
ECHO TV REGURGITANT PEAK GRADIENT: 32.35 MMHG
ECHO TV REGURGITANT PEAK GRADIENT: 36.85 MMHG
ECHO TV REGURGITANT PEAK GRADIENT: 38.22 MMHG
EOSINOPHIL # BLD: 0 K/UL (ref 0–0.4)
EOSINOPHIL # BLD: 0.1 K/UL (ref 0–0.4)
EOSINOPHIL # BLD: 0.3 K/UL (ref 0–0.4)
EOSINOPHIL # BLD: 0.6 K/UL (ref 0–0.4)
EOSINOPHIL NFR BLD: 0 % (ref 0–7)
EOSINOPHIL NFR BLD: 1 % (ref 0–7)
EOSINOPHIL NFR BLD: 2 % (ref 0–7)
EOSINOPHIL NFR BLD: 3 % (ref 0–7)
EPITH CASTS URNS QL MICRO: ABNORMAL /LPF
ERYTHROCYTE [DISTWIDTH] IN BLOOD BY AUTOMATED COUNT: 14.8 % (ref 11.5–14.5)
ERYTHROCYTE [DISTWIDTH] IN BLOOD BY AUTOMATED COUNT: 15.2 % (ref 11.5–14.5)
ERYTHROCYTE [DISTWIDTH] IN BLOOD BY AUTOMATED COUNT: 15.4 % (ref 11.5–14.5)
ERYTHROCYTE [DISTWIDTH] IN BLOOD BY AUTOMATED COUNT: 15.5 % (ref 11.5–14.5)
ERYTHROCYTE [DISTWIDTH] IN BLOOD BY AUTOMATED COUNT: 16.1 % (ref 11.5–14.5)
ERYTHROCYTE [DISTWIDTH] IN BLOOD BY AUTOMATED COUNT: 16.1 % (ref 11.5–14.5)
ERYTHROCYTE [DISTWIDTH] IN BLOOD BY AUTOMATED COUNT: 16.2 % (ref 11.5–14.5)
ERYTHROCYTE [DISTWIDTH] IN BLOOD BY AUTOMATED COUNT: 16.3 % (ref 11.5–14.5)
ERYTHROCYTE [DISTWIDTH] IN BLOOD BY AUTOMATED COUNT: 16.3 % (ref 11.5–14.5)
ERYTHROCYTE [DISTWIDTH] IN BLOOD BY AUTOMATED COUNT: 16.4 % (ref 11.5–14.5)
ERYTHROCYTE [DISTWIDTH] IN BLOOD BY AUTOMATED COUNT: 16.7 % (ref 11.5–14.5)
ERYTHROCYTE [DISTWIDTH] IN BLOOD BY AUTOMATED COUNT: 22.3 % (ref 11.5–14.5)
ERYTHROCYTE [DISTWIDTH] IN BLOOD BY AUTOMATED COUNT: 22.5 % (ref 11.5–14.5)
ERYTHROCYTE [DISTWIDTH] IN BLOOD BY AUTOMATED COUNT: 23.3 % (ref 11.5–14.5)
ERYTHROCYTE [DISTWIDTH] IN BLOOD BY AUTOMATED COUNT: 23.3 % (ref 11.5–14.5)
ERYTHROCYTE [DISTWIDTH] IN BLOOD BY AUTOMATED COUNT: 23.5 % (ref 11.5–14.5)
ERYTHROCYTE [DISTWIDTH] IN BLOOD BY AUTOMATED COUNT: 23.9 % (ref 11.5–14.5)
ERYTHROCYTE [DISTWIDTH] IN BLOOD BY AUTOMATED COUNT: 24.2 % (ref 11.5–14.5)
ERYTHROCYTE [DISTWIDTH] IN BLOOD BY AUTOMATED COUNT: 25.2 % (ref 11.5–14.5)
ERYTHROCYTE [DISTWIDTH] IN BLOOD BY AUTOMATED COUNT: 25.6 % (ref 11.5–14.5)
ERYTHROCYTE [DISTWIDTH] IN BLOOD BY AUTOMATED COUNT: 25.6 % (ref 11.5–14.5)
ERYTHROCYTE [DISTWIDTH] IN BLOOD BY AUTOMATED COUNT: 26.2 % (ref 11.5–14.5)
ERYTHROCYTE [DISTWIDTH] IN BLOOD BY AUTOMATED COUNT: 26.7 % (ref 11.5–14.5)
ERYTHROCYTE [DISTWIDTH] IN BLOOD BY AUTOMATED COUNT: 27 % (ref 11.5–14.5)
EST. AVERAGE GLUCOSE BLD GHB EST-MCNC: 146 MG/DL
FIBRINOGEN PPP-MCNC: 124 MG/DL (ref 200–475)
FIBRINOGEN PPP-MCNC: 226 MG/DL (ref 200–475)
FIBRINOGEN PPP-MCNC: 238 MG/DL (ref 200–475)
FIBRINOGEN PPP-MCNC: 267 MG/DL (ref 200–475)
FIBRINOGEN PPP-MCNC: 300 MG/DL (ref 200–475)
FIBRINOGEN PPP-MCNC: 325 MG/DL (ref 200–475)
FIBRINOGEN PPP-MCNC: 332 MG/DL (ref 200–475)
FIBRINOGEN PPP-MCNC: 372 MG/DL (ref 200–475)
FIBRINOGEN PPP-MCNC: 372 MG/DL (ref 200–475)
FIO2 ON VENT: 40 %
FIO2 ON VENT: 50 %
FLUAV H1 2009 PAND RNA SPEC QL NAA+PROBE: NOT DETECTED
FLUAV H1 RNA SPEC QL NAA+PROBE: NOT DETECTED
FLUAV H3 RNA SPEC QL NAA+PROBE: NOT DETECTED
FLUAV SUBTYP SPEC NAA+PROBE: NOT DETECTED
FLUBV RNA SPEC QL NAA+PROBE: NOT DETECTED
GAS FLOW.O2 SETTING OXYMISER: 18 L/MIN
GAS FLOW.O2 SETTING OXYMISER: 18 L/MIN
GLOBULIN SER CALC-MCNC: 2.3 G/DL (ref 2–4)
GLOBULIN SER CALC-MCNC: 2.3 G/DL (ref 2–4)
GLOBULIN SER CALC-MCNC: 2.4 G/DL (ref 2–4)
GLOBULIN SER CALC-MCNC: 2.4 G/DL (ref 2–4)
GLOBULIN SER CALC-MCNC: 2.5 G/DL (ref 2–4)
GLOBULIN SER CALC-MCNC: 2.5 G/DL (ref 2–4)
GLOBULIN SER CALC-MCNC: 2.6 G/DL (ref 2–4)
GLOBULIN SER CALC-MCNC: 2.8 G/DL (ref 2–4)
GLOBULIN SER CALC-MCNC: 2.9 G/DL (ref 2–4)
GLOBULIN SER CALC-MCNC: 3.1 G/DL (ref 2–4)
GLOBULIN SER CALC-MCNC: 3.2 G/DL (ref 2–4)
GLOBULIN SER CALC-MCNC: 3.3 G/DL (ref 2–4)
GLOBULIN SER CALC-MCNC: 3.4 G/DL (ref 2–4)
GLOBULIN SER CALC-MCNC: 3.6 G/DL (ref 2–4)
GLOBULIN SER CALC-MCNC: 3.6 G/DL (ref 2–4)
GLOBULIN SER CALC-MCNC: 3.8 G/DL (ref 2–4)
GLOBULIN SER CALC-MCNC: 3.9 G/DL (ref 2–4)
GLUCOSE BLD STRIP.AUTO-MCNC: 106 MG/DL (ref 65–100)
GLUCOSE BLD STRIP.AUTO-MCNC: 106 MG/DL (ref 65–100)
GLUCOSE BLD STRIP.AUTO-MCNC: 112 MG/DL (ref 65–100)
GLUCOSE BLD STRIP.AUTO-MCNC: 112 MG/DL (ref 65–100)
GLUCOSE BLD STRIP.AUTO-MCNC: 114 MG/DL (ref 65–100)
GLUCOSE BLD STRIP.AUTO-MCNC: 117 MG/DL (ref 65–100)
GLUCOSE BLD STRIP.AUTO-MCNC: 117 MG/DL (ref 65–100)
GLUCOSE BLD STRIP.AUTO-MCNC: 121 MG/DL (ref 65–100)
GLUCOSE BLD STRIP.AUTO-MCNC: 122 MG/DL (ref 65–100)
GLUCOSE BLD STRIP.AUTO-MCNC: 126 MG/DL (ref 65–100)
GLUCOSE BLD STRIP.AUTO-MCNC: 127 MG/DL (ref 65–100)
GLUCOSE BLD STRIP.AUTO-MCNC: 129 MG/DL (ref 65–100)
GLUCOSE BLD STRIP.AUTO-MCNC: 130 MG/DL (ref 65–100)
GLUCOSE BLD STRIP.AUTO-MCNC: 135 MG/DL (ref 65–100)
GLUCOSE BLD STRIP.AUTO-MCNC: 140 MG/DL (ref 65–100)
GLUCOSE BLD STRIP.AUTO-MCNC: 140 MG/DL (ref 65–100)
GLUCOSE BLD STRIP.AUTO-MCNC: 147 MG/DL (ref 65–100)
GLUCOSE BLD STRIP.AUTO-MCNC: 150 MG/DL (ref 65–100)
GLUCOSE BLD STRIP.AUTO-MCNC: 152 MG/DL (ref 65–100)
GLUCOSE BLD STRIP.AUTO-MCNC: 154 MG/DL (ref 65–100)
GLUCOSE BLD STRIP.AUTO-MCNC: 155 MG/DL (ref 65–100)
GLUCOSE BLD STRIP.AUTO-MCNC: 156 MG/DL (ref 65–100)
GLUCOSE BLD STRIP.AUTO-MCNC: 157 MG/DL (ref 65–100)
GLUCOSE BLD STRIP.AUTO-MCNC: 157 MG/DL (ref 65–100)
GLUCOSE BLD STRIP.AUTO-MCNC: 165 MG/DL (ref 65–100)
GLUCOSE BLD STRIP.AUTO-MCNC: 169 MG/DL (ref 65–100)
GLUCOSE BLD STRIP.AUTO-MCNC: 170 MG/DL (ref 65–100)
GLUCOSE BLD STRIP.AUTO-MCNC: 170 MG/DL (ref 65–100)
GLUCOSE BLD STRIP.AUTO-MCNC: 171 MG/DL (ref 65–100)
GLUCOSE BLD STRIP.AUTO-MCNC: 175 MG/DL (ref 65–100)
GLUCOSE BLD STRIP.AUTO-MCNC: 177 MG/DL (ref 65–100)
GLUCOSE BLD STRIP.AUTO-MCNC: 180 MG/DL (ref 65–100)
GLUCOSE BLD STRIP.AUTO-MCNC: 182 MG/DL (ref 65–100)
GLUCOSE BLD STRIP.AUTO-MCNC: 185 MG/DL (ref 65–100)
GLUCOSE BLD STRIP.AUTO-MCNC: 186 MG/DL (ref 65–100)
GLUCOSE BLD STRIP.AUTO-MCNC: 190 MG/DL (ref 65–100)
GLUCOSE BLD STRIP.AUTO-MCNC: 190 MG/DL (ref 65–100)
GLUCOSE BLD STRIP.AUTO-MCNC: 194 MG/DL (ref 65–100)
GLUCOSE BLD STRIP.AUTO-MCNC: 194 MG/DL (ref 65–100)
GLUCOSE BLD STRIP.AUTO-MCNC: 199 MG/DL (ref 65–100)
GLUCOSE BLD STRIP.AUTO-MCNC: 199 MG/DL (ref 65–100)
GLUCOSE BLD STRIP.AUTO-MCNC: 203 MG/DL (ref 65–100)
GLUCOSE BLD STRIP.AUTO-MCNC: 204 MG/DL (ref 65–100)
GLUCOSE BLD STRIP.AUTO-MCNC: 205 MG/DL (ref 65–100)
GLUCOSE BLD STRIP.AUTO-MCNC: 211 MG/DL (ref 65–100)
GLUCOSE BLD STRIP.AUTO-MCNC: 219 MG/DL (ref 65–100)
GLUCOSE BLD STRIP.AUTO-MCNC: 219 MG/DL (ref 65–100)
GLUCOSE BLD STRIP.AUTO-MCNC: 220 MG/DL (ref 65–100)
GLUCOSE BLD STRIP.AUTO-MCNC: 226 MG/DL (ref 65–100)
GLUCOSE BLD STRIP.AUTO-MCNC: 227 MG/DL (ref 65–100)
GLUCOSE BLD STRIP.AUTO-MCNC: 229 MG/DL (ref 65–100)
GLUCOSE BLD STRIP.AUTO-MCNC: 23 MG/DL (ref 65–100)
GLUCOSE BLD STRIP.AUTO-MCNC: 249 MG/DL (ref 65–100)
GLUCOSE BLD STRIP.AUTO-MCNC: 25 MG/DL (ref 65–100)
GLUCOSE BLD STRIP.AUTO-MCNC: 266 MG/DL (ref 65–100)
GLUCOSE BLD STRIP.AUTO-MCNC: 272 MG/DL (ref 65–100)
GLUCOSE BLD STRIP.AUTO-MCNC: 274 MG/DL (ref 65–100)
GLUCOSE BLD STRIP.AUTO-MCNC: 279 MG/DL (ref 65–100)
GLUCOSE BLD STRIP.AUTO-MCNC: 306 MG/DL (ref 65–100)
GLUCOSE BLD STRIP.AUTO-MCNC: 313 MG/DL (ref 65–100)
GLUCOSE BLD STRIP.AUTO-MCNC: 41 MG/DL (ref 65–100)
GLUCOSE BLD STRIP.AUTO-MCNC: 45 MG/DL (ref 65–100)
GLUCOSE BLD STRIP.AUTO-MCNC: 57 MG/DL (ref 65–100)
GLUCOSE BLD STRIP.AUTO-MCNC: 65 MG/DL (ref 65–100)
GLUCOSE BLD STRIP.AUTO-MCNC: 67 MG/DL (ref 65–100)
GLUCOSE BLD STRIP.AUTO-MCNC: 72 MG/DL (ref 65–100)
GLUCOSE BLD STRIP.AUTO-MCNC: 77 MG/DL (ref 65–100)
GLUCOSE BLD STRIP.AUTO-MCNC: 80 MG/DL (ref 65–100)
GLUCOSE BLD STRIP.AUTO-MCNC: 82 MG/DL (ref 65–100)
GLUCOSE BLD STRIP.AUTO-MCNC: 93 MG/DL (ref 65–100)
GLUCOSE BLD STRIP.AUTO-MCNC: 93 MG/DL (ref 65–100)
GLUCOSE BLD STRIP.AUTO-MCNC: 95 MG/DL (ref 65–100)
GLUCOSE BLD STRIP.AUTO-MCNC: 98 MG/DL (ref 65–100)
GLUCOSE SERPL-MCNC: 104 MG/DL (ref 65–100)
GLUCOSE SERPL-MCNC: 107 MG/DL (ref 65–100)
GLUCOSE SERPL-MCNC: 108 MG/DL (ref 65–100)
GLUCOSE SERPL-MCNC: 120 MG/DL (ref 65–100)
GLUCOSE SERPL-MCNC: 127 MG/DL (ref 65–100)
GLUCOSE SERPL-MCNC: 148 MG/DL (ref 65–100)
GLUCOSE SERPL-MCNC: 155 MG/DL (ref 65–100)
GLUCOSE SERPL-MCNC: 155 MG/DL (ref 65–100)
GLUCOSE SERPL-MCNC: 159 MG/DL (ref 65–100)
GLUCOSE SERPL-MCNC: 164 MG/DL (ref 65–100)
GLUCOSE SERPL-MCNC: 168 MG/DL (ref 65–100)
GLUCOSE SERPL-MCNC: 169 MG/DL (ref 65–100)
GLUCOSE SERPL-MCNC: 170 MG/DL (ref 65–100)
GLUCOSE SERPL-MCNC: 172 MG/DL (ref 65–100)
GLUCOSE SERPL-MCNC: 174 MG/DL (ref 65–100)
GLUCOSE SERPL-MCNC: 176 MG/DL (ref 65–100)
GLUCOSE SERPL-MCNC: 180 MG/DL (ref 65–100)
GLUCOSE SERPL-MCNC: 181 MG/DL (ref 65–100)
GLUCOSE SERPL-MCNC: 182 MG/DL (ref 65–100)
GLUCOSE SERPL-MCNC: 184 MG/DL (ref 65–100)
GLUCOSE SERPL-MCNC: 210 MG/DL (ref 65–100)
GLUCOSE SERPL-MCNC: 242 MG/DL (ref 65–100)
GLUCOSE SERPL-MCNC: 248 MG/DL (ref 65–100)
GLUCOSE SERPL-MCNC: 254 MG/DL (ref 65–100)
GLUCOSE SERPL-MCNC: 260 MG/DL (ref 65–100)
GLUCOSE SERPL-MCNC: 330 MG/DL (ref 65–100)
GLUCOSE SERPL-MCNC: 85 MG/DL (ref 65–100)
GLUCOSE UR STRIP.AUTO-MCNC: NEGATIVE MG/DL
GRAM STN SPEC: ABNORMAL
HADV DNA SPEC QL NAA+PROBE: NOT DETECTED
HBA1C MFR BLD: 6.7 % (ref 4–5.6)
HCO3 BLDA-SCNC: 17 MMOL/L (ref 22–26)
HCO3 BLDA-SCNC: 17 MMOL/L (ref 22–26)
HCO3 BLDV-SCNC: 18 MMOL/L (ref 23–28)
HCOV 229E RNA SPEC QL NAA+PROBE: NOT DETECTED
HCOV HKU1 RNA SPEC QL NAA+PROBE: NOT DETECTED
HCOV NL63 RNA SPEC QL NAA+PROBE: NOT DETECTED
HCOV OC43 RNA SPEC QL NAA+PROBE: NOT DETECTED
HCT VFR BLD AUTO: 20.9 % (ref 35–47)
HCT VFR BLD AUTO: 21.4 % (ref 35–47)
HCT VFR BLD AUTO: 21.6 % (ref 35–47)
HCT VFR BLD AUTO: 22.5 % (ref 35–47)
HCT VFR BLD AUTO: 22.6 % (ref 35–47)
HCT VFR BLD AUTO: 22.8 % (ref 35–47)
HCT VFR BLD AUTO: 22.9 % (ref 35–47)
HCT VFR BLD AUTO: 24.1 % (ref 35–47)
HCT VFR BLD AUTO: 24.3 % (ref 35–47)
HCT VFR BLD AUTO: 24.7 % (ref 35–47)
HCT VFR BLD AUTO: 24.8 % (ref 35–47)
HCT VFR BLD AUTO: 25.7 % (ref 35–47)
HCT VFR BLD AUTO: 25.7 % (ref 35–47)
HCT VFR BLD AUTO: 26.3 % (ref 35–47)
HCT VFR BLD AUTO: 30.5 % (ref 35–47)
HCT VFR BLD AUTO: 32.4 % (ref 35–47)
HCT VFR BLD AUTO: 35 % (ref 35–47)
HCT VFR BLD AUTO: 35.2 % (ref 35–47)
HCT VFR BLD AUTO: 36 % (ref 35–47)
HCT VFR BLD AUTO: 36 % (ref 35–47)
HCT VFR BLD AUTO: 37.7 % (ref 35–47)
HCT VFR BLD AUTO: 37.8 % (ref 35–47)
HCT VFR BLD AUTO: 38.1 % (ref 35–47)
HCT VFR BLD AUTO: 38.2 % (ref 35–47)
HCT VFR BLD AUTO: 38.9 % (ref 35–47)
HCT VFR BLD AUTO: 39.2 % (ref 35–47)
HCT VFR BLD AUTO: 39.5 % (ref 35–47)
HCT VFR BLD AUTO: 39.6 % (ref 35–47)
HCT VFR BLD AUTO: 40.2 % (ref 35–47)
HCT VFR BLD AUTO: 43.8 % (ref 35–47)
HDLC SERPL-MCNC: 18 MG/DL
HDLC SERPL: 4.4 {RATIO} (ref 0–5)
HGB BLD OXIMETRY-MCNC: 10.7 G/DL (ref 14–17)
HGB BLD OXIMETRY-MCNC: 11.5 G/DL (ref 14–17)
HGB BLD OXIMETRY-MCNC: 11.8 G/DL (ref 14–17)
HGB BLD OXIMETRY-MCNC: 12.4 G/DL (ref 14–17)
HGB BLD OXIMETRY-MCNC: 7.9 G/DL (ref 14–17)
HGB BLD OXIMETRY-MCNC: 8.1 G/DL (ref 14–17)
HGB BLD-MCNC: 10.5 G/DL (ref 11.5–16)
HGB BLD-MCNC: 10.8 G/DL (ref 11.5–16)
HGB BLD-MCNC: 10.8 G/DL (ref 11.5–16)
HGB BLD-MCNC: 11 G/DL (ref 11.5–16)
HGB BLD-MCNC: 11.1 G/DL (ref 11.5–16)
HGB BLD-MCNC: 11.3 G/DL (ref 11.5–16)
HGB BLD-MCNC: 11.4 G/DL (ref 11.5–16)
HGB BLD-MCNC: 11.5 G/DL (ref 11.5–16)
HGB BLD-MCNC: 11.5 G/DL (ref 11.5–16)
HGB BLD-MCNC: 11.6 G/DL (ref 11.5–16)
HGB BLD-MCNC: 11.6 G/DL (ref 11.5–16)
HGB BLD-MCNC: 12.1 G/DL (ref 11.5–16)
HGB BLD-MCNC: 12.5 G/DL (ref 11.5–16)
HGB BLD-MCNC: 7.1 G/DL (ref 11.5–16)
HGB BLD-MCNC: 7.2 G/DL (ref 11.5–16)
HGB BLD-MCNC: 7.4 G/DL (ref 11.5–16)
HGB BLD-MCNC: 7.6 G/DL (ref 11.5–16)
HGB BLD-MCNC: 7.9 G/DL (ref 11.5–16)
HGB BLD-MCNC: 7.9 G/DL (ref 11.5–16)
HGB BLD-MCNC: 8.2 G/DL (ref 11.5–16)
HGB BLD-MCNC: 8.2 G/DL (ref 11.5–16)
HGB BLD-MCNC: 8.3 G/DL (ref 11.5–16)
HGB BLD-MCNC: 8.3 G/DL (ref 11.5–16)
HGB BLD-MCNC: 8.6 G/DL (ref 11.5–16)
HGB BLD-MCNC: 8.8 G/DL (ref 11.5–16)
HGB BLD-MCNC: 9 G/DL (ref 11.5–16)
HGB BLD-MCNC: 9.5 G/DL (ref 11.5–16)
HGB UR QL STRIP: ABNORMAL
HISTORY CHECKED?,CKHIST: NORMAL
HMPV RNA SPEC QL NAA+PROBE: NOT DETECTED
HPIV1 RNA SPEC QL NAA+PROBE: NOT DETECTED
HPIV2 RNA SPEC QL NAA+PROBE: NOT DETECTED
HPIV3 RNA SPEC QL NAA+PROBE: NOT DETECTED
HPIV4 RNA SPEC QL NAA+PROBE: NOT DETECTED
HYALINE CASTS URNS QL MICRO: ABNORMAL /LPF (ref 0–5)
IMM GRANULOCYTES # BLD AUTO: 0 K/UL (ref 0–0.04)
IMM GRANULOCYTES # BLD AUTO: 0.1 K/UL (ref 0–0.04)
IMM GRANULOCYTES # BLD AUTO: 0.2 K/UL (ref 0–0.04)
IMM GRANULOCYTES NFR BLD AUTO: 0 % (ref 0–0.5)
IMM GRANULOCYTES NFR BLD AUTO: 1 % (ref 0–0.5)
INR BLD: 1.3
INR PPP: 1.1 (ref 0.9–1.1)
INR PPP: 1.2 (ref 0.9–1.1)
INR PPP: 1.2 (ref 0.9–1.2)
INR PPP: 1.3 (ref 0.9–1.1)
INR PPP: 1.3 (ref 0.9–1.1)
INR PPP: 1.4 (ref 0.9–1.1)
INR PPP: 1.6 (ref 0.9–1.1)
INR PPP: 1.7 (ref 0.9–1.1)
IPAP/PIP, IPAPIP: 10
KETONES UR QL STRIP.AUTO: NEGATIVE MG/DL
LACTATE SERPL-SCNC: 1 MMOL/L (ref 0.4–2)
LACTATE SERPL-SCNC: 1 MMOL/L (ref 0.4–2)
LACTATE SERPL-SCNC: 1.2 MMOL/L (ref 0.4–2)
LACTATE SERPL-SCNC: 3 MMOL/L (ref 0.4–2)
LDLC SERPL CALC-MCNC: 44.6 MG/DL (ref 0–100)
LEUKOCYTE ESTERASE UR QL STRIP.AUTO: ABNORMAL
LIPID PROFILE,FLP: NORMAL
LVOT MG: 0.56 MMHG
LVOT MG: 1.85 MMHG
LYMPHOCYTES # BLD: 0.2 K/UL (ref 0.8–3.5)
LYMPHOCYTES # BLD: 0.3 K/UL (ref 0.8–3.5)
LYMPHOCYTES # BLD: 0.4 K/UL (ref 0.8–3.5)
LYMPHOCYTES # BLD: 0.4 K/UL (ref 0.8–3.5)
LYMPHOCYTES # BLD: 0.5 K/UL (ref 0.8–3.5)
LYMPHOCYTES # BLD: 0.6 K/UL (ref 0.8–3.5)
LYMPHOCYTES # BLD: 0.7 K/UL (ref 0.8–3.5)
LYMPHOCYTES # BLD: 0.8 K/UL (ref 0.8–3.5)
LYMPHOCYTES # BLD: 1.3 K/UL (ref 0.8–3.5)
LYMPHOCYTES # BLD: 1.5 K/UL (ref 0.8–3.5)
LYMPHOCYTES NFR BLD: 1 % (ref 12–49)
LYMPHOCYTES NFR BLD: 11 % (ref 12–49)
LYMPHOCYTES NFR BLD: 15 % (ref 12–49)
LYMPHOCYTES NFR BLD: 2 % (ref 12–49)
LYMPHOCYTES NFR BLD: 2 % (ref 12–49)
LYMPHOCYTES NFR BLD: 29 % (ref 12–49)
LYMPHOCYTES NFR BLD: 3 % (ref 12–49)
LYMPHOCYTES NFR BLD: 3 % (ref 12–49)
LYMPHOCYTES NFR BLD: 4 % (ref 12–49)
LYMPHOCYTES NFR BLD: 5 % (ref 12–49)
LYMPHOCYTES NFR BLD: 8 % (ref 12–49)
LYMPHOCYTES NFR BLD: 9 % (ref 12–49)
M PNEUMO DNA SPEC QL NAA+PROBE: NOT DETECTED
MAGNESIUM SERPL-MCNC: 1.7 MG/DL (ref 1.6–2.4)
MAGNESIUM SERPL-MCNC: 1.8 MG/DL (ref 1.6–2.4)
MAGNESIUM SERPL-MCNC: 1.8 MG/DL (ref 1.6–2.4)
MAGNESIUM SERPL-MCNC: 2 MG/DL (ref 1.6–2.4)
MAGNESIUM SERPL-MCNC: 2 MG/DL (ref 1.6–2.4)
MAGNESIUM SERPL-MCNC: 2.1 MG/DL (ref 1.6–2.4)
MAGNESIUM SERPL-MCNC: 2.2 MG/DL (ref 1.6–2.4)
MAGNESIUM SERPL-MCNC: 2.3 MG/DL (ref 1.6–2.4)
MAGNESIUM SERPL-MCNC: 2.5 MG/DL (ref 1.6–2.4)
MCH RBC QN AUTO: 25.2 PG (ref 26–34)
MCH RBC QN AUTO: 25.3 PG (ref 26–34)
MCH RBC QN AUTO: 25.4 PG (ref 26–34)
MCH RBC QN AUTO: 25.4 PG (ref 26–34)
MCH RBC QN AUTO: 25.6 PG (ref 26–34)
MCH RBC QN AUTO: 25.7 PG (ref 26–34)
MCH RBC QN AUTO: 25.8 PG (ref 26–34)
MCH RBC QN AUTO: 26 PG (ref 26–34)
MCH RBC QN AUTO: 26.2 PG (ref 26–34)
MCH RBC QN AUTO: 26.3 PG (ref 26–34)
MCH RBC QN AUTO: 26.4 PG (ref 26–34)
MCH RBC QN AUTO: 26.6 PG (ref 26–34)
MCH RBC QN AUTO: 26.7 PG (ref 26–34)
MCH RBC QN AUTO: 29 PG (ref 26–34)
MCH RBC QN AUTO: 29.3 PG (ref 26–34)
MCH RBC QN AUTO: 29.4 PG (ref 26–34)
MCH RBC QN AUTO: 29.5 PG (ref 26–34)
MCH RBC QN AUTO: 29.7 PG (ref 26–34)
MCH RBC QN AUTO: 29.9 PG (ref 26–34)
MCH RBC QN AUTO: 30.1 PG (ref 26–34)
MCH RBC QN AUTO: 30.1 PG (ref 26–34)
MCH RBC QN AUTO: 30.2 PG (ref 26–34)
MCHC RBC AUTO-ENTMCNC: 27.8 G/DL (ref 30–36.5)
MCHC RBC AUTO-ENTMCNC: 28.3 G/DL (ref 30–36.5)
MCHC RBC AUTO-ENTMCNC: 28.5 G/DL (ref 30–36.5)
MCHC RBC AUTO-ENTMCNC: 28.9 G/DL (ref 30–36.5)
MCHC RBC AUTO-ENTMCNC: 29.2 G/DL (ref 30–36.5)
MCHC RBC AUTO-ENTMCNC: 29.3 G/DL (ref 30–36.5)
MCHC RBC AUTO-ENTMCNC: 29.6 G/DL (ref 30–36.5)
MCHC RBC AUTO-ENTMCNC: 29.6 G/DL (ref 30–36.5)
MCHC RBC AUTO-ENTMCNC: 30.1 G/DL (ref 30–36.5)
MCHC RBC AUTO-ENTMCNC: 30.1 G/DL (ref 30–36.5)
MCHC RBC AUTO-ENTMCNC: 30.6 G/DL (ref 30–36.5)
MCHC RBC AUTO-ENTMCNC: 31.1 G/DL (ref 30–36.5)
MCHC RBC AUTO-ENTMCNC: 31.3 G/DL (ref 30–36.5)
MCHC RBC AUTO-ENTMCNC: 31.4 G/DL (ref 30–36.5)
MCHC RBC AUTO-ENTMCNC: 31.7 G/DL (ref 30–36.5)
MCHC RBC AUTO-ENTMCNC: 32.5 G/DL (ref 30–36.5)
MCHC RBC AUTO-ENTMCNC: 32.9 G/DL (ref 30–36.5)
MCHC RBC AUTO-ENTMCNC: 33.1 G/DL (ref 30–36.5)
MCHC RBC AUTO-ENTMCNC: 33.2 G/DL (ref 30–36.5)
MCHC RBC AUTO-ENTMCNC: 33.3 G/DL (ref 30–36.5)
MCHC RBC AUTO-ENTMCNC: 33.5 G/DL (ref 30–36.5)
MCHC RBC AUTO-ENTMCNC: 33.5 G/DL (ref 30–36.5)
MCHC RBC AUTO-ENTMCNC: 33.8 G/DL (ref 30–36.5)
MCHC RBC AUTO-ENTMCNC: 34.4 G/DL (ref 30–36.5)
MCHC RBC AUTO-ENTMCNC: 34.5 G/DL (ref 30–36.5)
MCHC RBC AUTO-ENTMCNC: 35 G/DL (ref 30–36.5)
MCV RBC AUTO: 83.3 FL (ref 80–99)
MCV RBC AUTO: 83.7 FL (ref 80–99)
MCV RBC AUTO: 83.9 FL (ref 80–99)
MCV RBC AUTO: 84 FL (ref 80–99)
MCV RBC AUTO: 84.8 FL (ref 80–99)
MCV RBC AUTO: 85.4 FL (ref 80–99)
MCV RBC AUTO: 86.1 FL (ref 80–99)
MCV RBC AUTO: 86.7 FL (ref 80–99)
MCV RBC AUTO: 86.8 FL (ref 80–99)
MCV RBC AUTO: 87 FL (ref 80–99)
MCV RBC AUTO: 87.2 FL (ref 80–99)
MCV RBC AUTO: 87.4 FL (ref 80–99)
MCV RBC AUTO: 87.6 FL (ref 80–99)
MCV RBC AUTO: 87.7 FL (ref 80–99)
MCV RBC AUTO: 88 FL (ref 80–99)
MCV RBC AUTO: 88 FL (ref 80–99)
MCV RBC AUTO: 88.8 FL (ref 80–99)
MCV RBC AUTO: 88.8 FL (ref 80–99)
MCV RBC AUTO: 88.9 FL (ref 80–99)
MCV RBC AUTO: 89.4 FL (ref 80–99)
MCV RBC AUTO: 89.9 FL (ref 80–99)
MCV RBC AUTO: 90.3 FL (ref 80–99)
MCV RBC AUTO: 90.4 FL (ref 80–99)
MCV RBC AUTO: 90.8 FL (ref 80–99)
MCV RBC AUTO: 91.5 FL (ref 80–99)
MCV RBC AUTO: 91.6 FL (ref 80–99)
METHGB MFR BLD: 0.2 % (ref 0–1.4)
METHGB MFR BLD: 0.2 % (ref 0–1.4)
METHGB MFR BLD: 0.3 % (ref 0–1.4)
METHGB MFR BLD: 0.3 % (ref 0–1.4)
METHGB MFR BLD: 0.5 % (ref 0–1.4)
METHGB MFR BLD: 0.5 % (ref 0–1.4)
MONOCYTES # BLD: 0.1 K/UL (ref 0–1)
MONOCYTES # BLD: 0.2 K/UL (ref 0–1)
MONOCYTES # BLD: 0.4 K/UL (ref 0–1)
MONOCYTES # BLD: 0.5 K/UL (ref 0–1)
MONOCYTES # BLD: 0.6 K/UL (ref 0–1)
MONOCYTES # BLD: 0.6 K/UL (ref 0–1)
MONOCYTES # BLD: 0.7 K/UL (ref 0–1)
MONOCYTES # BLD: 0.8 K/UL (ref 0–1)
MONOCYTES # BLD: 0.8 K/UL (ref 0–1)
MONOCYTES # BLD: 1.2 K/UL (ref 0–1)
MONOCYTES # BLD: 1.4 K/UL (ref 0–1)
MONOCYTES # BLD: 2.2 K/UL (ref 0–1)
MONOCYTES NFR BLD: 1 % (ref 5–13)
MONOCYTES NFR BLD: 10 % (ref 5–13)
MONOCYTES NFR BLD: 10 % (ref 5–13)
MONOCYTES NFR BLD: 11 % (ref 5–13)
MONOCYTES NFR BLD: 11 % (ref 5–13)
MONOCYTES NFR BLD: 15 % (ref 5–13)
MONOCYTES NFR BLD: 2 % (ref 5–13)
MONOCYTES NFR BLD: 3 % (ref 5–13)
MONOCYTES NFR BLD: 3 % (ref 5–13)
MONOCYTES NFR BLD: 5 % (ref 5–13)
MONOCYTES NFR BLD: 7 % (ref 5–13)
MONOCYTES NFR BLD: 8 % (ref 5–13)
MR PISA PV: 478.63 CM/S
NEUTS BAND NFR BLD MANUAL: 1 %
NEUTS BAND NFR BLD MANUAL: 1 %
NEUTS BAND NFR BLD MANUAL: 2 %
NEUTS BAND NFR BLD MANUAL: 3 %
NEUTS SEG # BLD: 11.1 K/UL (ref 1.8–8)
NEUTS SEG # BLD: 11.2 K/UL (ref 1.8–8)
NEUTS SEG # BLD: 12.7 K/UL (ref 1.8–8)
NEUTS SEG # BLD: 14.9 K/UL (ref 1.8–8)
NEUTS SEG # BLD: 19 K/UL (ref 1.8–8)
NEUTS SEG # BLD: 23.4 K/UL (ref 1.8–8)
NEUTS SEG # BLD: 26.2 K/UL (ref 1.8–8)
NEUTS SEG # BLD: 3.3 K/UL (ref 1.8–8)
NEUTS SEG # BLD: 3.5 K/UL (ref 1.8–8)
NEUTS SEG # BLD: 3.8 K/UL (ref 1.8–8)
NEUTS SEG # BLD: 4.8 K/UL (ref 1.8–8)
NEUTS SEG # BLD: 6 K/UL (ref 1.8–8)
NEUTS SEG NFR BLD: 62 % (ref 32–75)
NEUTS SEG NFR BLD: 73 % (ref 32–75)
NEUTS SEG NFR BLD: 74 % (ref 32–75)
NEUTS SEG NFR BLD: 77 % (ref 32–75)
NEUTS SEG NFR BLD: 82 % (ref 32–75)
NEUTS SEG NFR BLD: 83 % (ref 32–75)
NEUTS SEG NFR BLD: 86 % (ref 32–75)
NEUTS SEG NFR BLD: 88 % (ref 32–75)
NEUTS SEG NFR BLD: 91 % (ref 32–75)
NEUTS SEG NFR BLD: 92 % (ref 32–75)
NEUTS SEG NFR BLD: 94 % (ref 32–75)
NEUTS SEG NFR BLD: 94 % (ref 32–75)
NITRITE UR QL STRIP.AUTO: NEGATIVE
NRBC # BLD: 0 K/UL (ref 0–0.01)
NRBC # BLD: 0.02 K/UL (ref 0–0.01)
NRBC # BLD: 0.03 K/UL (ref 0–0.01)
NRBC # BLD: 0.04 K/UL (ref 0–0.01)
NRBC # BLD: 0.05 K/UL (ref 0–0.01)
NRBC # BLD: 0.06 K/UL (ref 0–0.01)
NRBC # BLD: 0.07 K/UL (ref 0–0.01)
NRBC # BLD: 0.09 K/UL (ref 0–0.01)
NRBC # BLD: 0.1 K/UL (ref 0–0.01)
NRBC # BLD: 0.1 K/UL (ref 0–0.01)
NRBC # BLD: 0.12 K/UL (ref 0–0.01)
NRBC # BLD: 0.15 K/UL (ref 0–0.01)
NRBC # BLD: 0.16 K/UL (ref 0–0.01)
NRBC # BLD: 0.16 K/UL (ref 0–0.01)
NRBC # BLD: 0.17 K/UL (ref 0–0.01)
NRBC # BLD: 0.26 K/UL (ref 0–0.01)
NRBC # BLD: 0.26 K/UL (ref 0–0.01)
NRBC # BLD: 0.32 K/UL (ref 0–0.01)
NRBC BLD-RTO: 0 PER 100 WBC
NRBC BLD-RTO: 0.1 PER 100 WBC
NRBC BLD-RTO: 0.2 PER 100 WBC
NRBC BLD-RTO: 0.3 PER 100 WBC
NRBC BLD-RTO: 0.4 PER 100 WBC
NRBC BLD-RTO: 0.6 PER 100 WBC
NRBC BLD-RTO: 0.7 PER 100 WBC
NRBC BLD-RTO: 0.9 PER 100 WBC
NRBC BLD-RTO: 0.9 PER 100 WBC
NRBC BLD-RTO: 1.3 PER 100 WBC
NRBC BLD-RTO: 1.3 PER 100 WBC
NRBC BLD-RTO: 1.6 PER 100 WBC
NRBC BLD-RTO: 1.7 PER 100 WBC
NRBC BLD-RTO: 2.3 PER 100 WBC
NRBC BLD-RTO: 2.7 PER 100 WBC
NRBC BLD-RTO: 3.2 PER 100 WBC
NRBC BLD-RTO: 4.1 PER 100 WBC
NRBC BLD-RTO: 6.2 PER 100 WBC
OXYHGB MFR BLD: 52.8 % (ref 94–97)
OXYHGB MFR BLD: 64.4 % (ref 94–97)
OXYHGB MFR BLD: 67.4 % (ref 94–97)
OXYHGB MFR BLD: 78.7 % (ref 94–97)
OXYHGB MFR BLD: 82.3 % (ref 94–97)
OXYHGB MFR BLD: 82.4 % (ref 94–97)
PCO2 BLDA: 32 MMHG (ref 35–45)
PCO2 BLDA: 32 MMHG (ref 35–45)
PCO2 BLDV: 36 MMHG (ref 41–51)
PEEP RESPIRATORY: 5 CM[H2O]
PH BLDA: 7.33 [PH] (ref 7.35–7.45)
PH BLDA: 7.34 [PH] (ref 7.35–7.45)
PH BLDV: 7.31 [PH] (ref 7.32–7.42)
PH UR STRIP: 5 [PH] (ref 5–8)
PHOSPHATE SERPL-MCNC: 3.9 MG/DL (ref 2.6–4.7)
PHOSPHATE SERPL-MCNC: 3.9 MG/DL (ref 2.6–4.7)
PHOSPHATE SERPL-MCNC: 4.3 MG/DL (ref 2.6–4.7)
PHOSPHATE SERPL-MCNC: 4.5 MG/DL (ref 2.6–4.7)
PHOSPHATE SERPL-MCNC: 4.6 MG/DL (ref 2.6–4.7)
PHOSPHATE SERPL-MCNC: 4.7 MG/DL (ref 2.6–4.7)
PHOSPHATE SERPL-MCNC: 4.7 MG/DL (ref 2.6–4.7)
PHOSPHATE SERPL-MCNC: 4.8 MG/DL (ref 2.6–4.7)
PHOSPHATE SERPL-MCNC: 4.9 MG/DL (ref 2.6–4.7)
PHOSPHATE SERPL-MCNC: 5.1 MG/DL (ref 2.6–4.7)
PHOSPHATE SERPL-MCNC: 5.3 MG/DL (ref 2.6–4.7)
PHOSPHATE SERPL-MCNC: 5.3 MG/DL (ref 2.6–4.7)
PHOSPHATE SERPL-MCNC: 5.4 MG/DL (ref 2.6–4.7)
PHOSPHATE SERPL-MCNC: 5.7 MG/DL (ref 2.6–4.7)
PLATELET # BLD AUTO: 110 K/UL (ref 150–400)
PLATELET # BLD AUTO: 117 K/UL (ref 150–400)
PLATELET # BLD AUTO: 125 K/UL (ref 150–400)
PLATELET # BLD AUTO: 125 K/UL (ref 150–400)
PLATELET # BLD AUTO: 150 K/UL (ref 150–400)
PLATELET # BLD AUTO: 152 K/UL (ref 150–400)
PLATELET # BLD AUTO: 156 K/UL (ref 150–400)
PLATELET # BLD AUTO: 159 K/UL (ref 150–400)
PLATELET # BLD AUTO: 160 K/UL (ref 150–400)
PLATELET # BLD AUTO: 169 K/UL (ref 150–400)
PLATELET # BLD AUTO: 169 K/UL (ref 150–400)
PLATELET # BLD AUTO: 170 K/UL (ref 150–400)
PLATELET # BLD AUTO: 188 K/UL (ref 150–400)
PLATELET # BLD AUTO: 199 K/UL (ref 150–400)
PLATELET # BLD AUTO: 200 K/UL (ref 150–400)
PLATELET # BLD AUTO: 200 K/UL (ref 150–400)
PLATELET # BLD AUTO: 205 K/UL (ref 150–400)
PLATELET # BLD AUTO: 220 K/UL (ref 150–400)
PLATELET # BLD AUTO: 225 K/UL (ref 150–400)
PLATELET # BLD AUTO: 233 K/UL (ref 150–400)
PLATELET # BLD AUTO: 239 K/UL (ref 150–400)
PLATELET # BLD AUTO: 240 K/UL (ref 150–400)
PLATELET # BLD AUTO: 251 K/UL (ref 150–400)
PLATELET # BLD AUTO: 252 K/UL (ref 150–400)
PLATELET # BLD AUTO: 278 K/UL (ref 150–400)
PLATELET # BLD AUTO: 281 K/UL (ref 150–400)
PLATELET COMMENTS,PCOM: ABNORMAL
PMV BLD AUTO: 10.3 FL (ref 8.9–12.9)
PMV BLD AUTO: 10.4 FL (ref 8.9–12.9)
PMV BLD AUTO: 10.5 FL (ref 8.9–12.9)
PMV BLD AUTO: 10.5 FL (ref 8.9–12.9)
PMV BLD AUTO: 10.6 FL (ref 8.9–12.9)
PMV BLD AUTO: 10.7 FL (ref 8.9–12.9)
PMV BLD AUTO: 10.8 FL (ref 8.9–12.9)
PMV BLD AUTO: 10.9 FL (ref 8.9–12.9)
PMV BLD AUTO: 10.9 FL (ref 8.9–12.9)
PMV BLD AUTO: 11 FL (ref 8.9–12.9)
PMV BLD AUTO: 11 FL (ref 8.9–12.9)
PMV BLD AUTO: 11.1 FL (ref 8.9–12.9)
PMV BLD AUTO: 11.1 FL (ref 8.9–12.9)
PMV BLD AUTO: 11.2 FL (ref 8.9–12.9)
PMV BLD AUTO: 11.4 FL (ref 8.9–12.9)
PMV BLD AUTO: 11.6 FL (ref 8.9–12.9)
PMV BLD AUTO: 11.6 FL (ref 8.9–12.9)
PMV BLD AUTO: 11.7 FL (ref 8.9–12.9)
PMV BLD AUTO: 12 FL (ref 8.9–12.9)
PMV BLD AUTO: 12.1 FL (ref 8.9–12.9)
PO2 BLDA: 127 MMHG (ref 80–100)
PO2 BLDA: 155 MMHG (ref 80–100)
PO2 BLDV: 33 MMHG (ref 25–40)
POTASSIUM SERPL-SCNC: 3 MMOL/L (ref 3.5–5.1)
POTASSIUM SERPL-SCNC: 3.1 MMOL/L (ref 3.5–5.1)
POTASSIUM SERPL-SCNC: 3.3 MMOL/L (ref 3.5–5.1)
POTASSIUM SERPL-SCNC: 3.3 MMOL/L (ref 3.5–5.1)
POTASSIUM SERPL-SCNC: 3.4 MMOL/L (ref 3.5–5.1)
POTASSIUM SERPL-SCNC: 3.4 MMOL/L (ref 3.5–5.1)
POTASSIUM SERPL-SCNC: 3.5 MMOL/L (ref 3.5–5.1)
POTASSIUM SERPL-SCNC: 3.6 MMOL/L (ref 3.5–5.1)
POTASSIUM SERPL-SCNC: 3.7 MMOL/L (ref 3.5–5.1)
POTASSIUM SERPL-SCNC: 3.8 MMOL/L (ref 3.5–5.1)
POTASSIUM SERPL-SCNC: 3.8 MMOL/L (ref 3.5–5.1)
POTASSIUM SERPL-SCNC: 3.9 MMOL/L (ref 3.5–5.1)
POTASSIUM SERPL-SCNC: 3.9 MMOL/L (ref 3.5–5.1)
POTASSIUM SERPL-SCNC: 4 MMOL/L (ref 3.5–5.1)
POTASSIUM SERPL-SCNC: 4.1 MMOL/L (ref 3.5–5.1)
POTASSIUM SERPL-SCNC: 4.1 MMOL/L (ref 3.5–5.1)
POTASSIUM SERPL-SCNC: 4.3 MMOL/L (ref 3.5–5.1)
POTASSIUM SERPL-SCNC: 4.5 MMOL/L (ref 3.5–5.1)
POTASSIUM SERPL-SCNC: 4.5 MMOL/L (ref 3.5–5.1)
POTASSIUM SERPL-SCNC: 4.7 MMOL/L (ref 3.5–5.1)
POTASSIUM SERPL-SCNC: 4.7 MMOL/L (ref 3.5–5.1)
POTASSIUM SERPL-SCNC: 4.8 MMOL/L (ref 3.5–5.1)
POTASSIUM SERPL-SCNC: 5.3 MMOL/L (ref 3.5–5.1)
PROCALCITONIN SERPL-MCNC: 0.18 NG/ML
PROCALCITONIN SERPL-MCNC: 0.88 NG/ML
PROCALCITONIN SERPL-MCNC: 0.91 NG/ML
PROT SERPL-MCNC: 4.3 G/DL (ref 6.4–8.2)
PROT SERPL-MCNC: 4.4 G/DL (ref 6.4–8.2)
PROT SERPL-MCNC: 4.5 G/DL (ref 6.4–8.2)
PROT SERPL-MCNC: 4.5 G/DL (ref 6.4–8.2)
PROT SERPL-MCNC: 4.6 G/DL (ref 6.4–8.2)
PROT SERPL-MCNC: 4.7 G/DL (ref 6.4–8.2)
PROT SERPL-MCNC: 4.7 G/DL (ref 6.4–8.2)
PROT SERPL-MCNC: 4.8 G/DL (ref 6.4–8.2)
PROT SERPL-MCNC: 4.8 G/DL (ref 6.4–8.2)
PROT SERPL-MCNC: 5.5 G/DL (ref 6.4–8.2)
PROT SERPL-MCNC: 5.6 G/DL (ref 6.4–8.2)
PROT SERPL-MCNC: 5.6 G/DL (ref 6.4–8.2)
PROT SERPL-MCNC: 5.8 G/DL (ref 6.4–8.2)
PROT SERPL-MCNC: 6.4 G/DL (ref 6.4–8.2)
PROT SERPL-MCNC: 6.6 G/DL (ref 6.4–8.2)
PROT SERPL-MCNC: 6.8 G/DL (ref 6.4–8.2)
PROT SERPL-MCNC: 7.2 G/DL (ref 6.4–8.2)
PROT UR STRIP-MCNC: NEGATIVE MG/DL
PROTHROMBIN TIME: 11.5 SEC (ref 9–11.1)
PROTHROMBIN TIME: 11.6 SEC (ref 9–11.1)
PROTHROMBIN TIME: 11.9 SEC (ref 9–11.1)
PROTHROMBIN TIME: 12.1 SEC (ref 9–11.1)
PROTHROMBIN TIME: 12.4 SEC (ref 9.1–12)
PROTHROMBIN TIME: 12.4 SEC (ref 9–11.1)
PROTHROMBIN TIME: 12.6 SEC (ref 9–11.1)
PROTHROMBIN TIME: 12.6 SEC (ref 9–11.1)
PROTHROMBIN TIME: 12.7 SEC (ref 9–11.1)
PROTHROMBIN TIME: 12.8 SEC (ref 9–11.1)
PROTHROMBIN TIME: 12.9 SEC (ref 9–11.1)
PROTHROMBIN TIME: 12.9 SEC (ref 9–11.1)
PROTHROMBIN TIME: 13 SEC (ref 9–11.1)
PROTHROMBIN TIME: 13 SEC (ref 9–11.1)
PROTHROMBIN TIME: 14.5 SEC (ref 9–11.1)
PROTHROMBIN TIME: 16.6 SEC (ref 9–11.1)
PROTHROMBIN TIME: 17.7 SEC (ref 9–11.1)
PT POC: 15.1 SECONDS
Q-T INTERVAL, ECG07: 422 MS
Q-T INTERVAL, ECG07: 440 MS
Q-T INTERVAL, ECG07: 482 MS
QRS DURATION, ECG06: 164 MS
QRS DURATION, ECG06: 182 MS
QRS DURATION, ECG06: 182 MS
QTC CALCULATION (BEZET), ECG08: 519 MS
QTC CALCULATION (BEZET), ECG08: 538 MS
QTC CALCULATION (BEZET), ECG08: 592 MS
RBC # BLD AUTO: 2.39 M/UL (ref 3.8–5.2)
RBC # BLD AUTO: 2.49 M/UL (ref 3.8–5.2)
RBC # BLD AUTO: 2.58 M/UL (ref 3.8–5.2)
RBC # BLD AUTO: 2.62 M/UL (ref 3.8–5.2)
RBC # BLD AUTO: 2.72 M/UL (ref 3.8–5.2)
RBC # BLD AUTO: 2.78 M/UL (ref 3.8–5.2)
RBC # BLD AUTO: 2.79 M/UL (ref 3.8–5.2)
RBC # BLD AUTO: 2.86 M/UL (ref 3.8–5.2)
RBC # BLD AUTO: 3.03 M/UL (ref 3.8–5.2)
RBC # BLD AUTO: 3.03 M/UL (ref 3.8–5.2)
RBC # BLD AUTO: 3.66 M/UL (ref 3.8–5.2)
RBC # BLD AUTO: 3.68 M/UL (ref 3.8–5.2)
RBC # BLD AUTO: 4.13 M/UL (ref 3.8–5.2)
RBC # BLD AUTO: 4.18 M/UL (ref 3.8–5.2)
RBC # BLD AUTO: 4.18 M/UL (ref 3.8–5.2)
RBC # BLD AUTO: 4.19 M/UL (ref 3.8–5.2)
RBC # BLD AUTO: 4.29 M/UL (ref 3.8–5.2)
RBC # BLD AUTO: 4.29 M/UL (ref 3.8–5.2)
RBC # BLD AUTO: 4.3 M/UL (ref 3.8–5.2)
RBC # BLD AUTO: 4.3 M/UL (ref 3.8–5.2)
RBC # BLD AUTO: 4.43 M/UL (ref 3.8–5.2)
RBC # BLD AUTO: 4.47 M/UL (ref 3.8–5.2)
RBC # BLD AUTO: 4.49 M/UL (ref 3.8–5.2)
RBC # BLD AUTO: 4.59 M/UL (ref 3.8–5.2)
RBC # BLD AUTO: 4.59 M/UL (ref 3.8–5.2)
RBC # BLD AUTO: 4.85 M/UL (ref 3.8–5.2)
RBC #/AREA URNS HPF: ABNORMAL /HPF (ref 0–5)
RBC MORPH BLD: ABNORMAL
RSV RNA SPEC QL NAA+PROBE: NOT DETECTED
RV+EV RNA SPEC QL NAA+PROBE: NOT DETECTED
SAMPLES BEING HELD,HOLD: NORMAL
SAMPLES BEING HELD,HOLD: NORMAL
SAO2 % BLD: 53 % (ref 95–99)
SAO2 % BLD: 65 % (ref 95–99)
SAO2 % BLD: 68 % (ref 95–99)
SAO2 % BLD: 79 % (ref 95–99)
SAO2 % BLD: 83 % (ref 95–99)
SAO2 % BLD: 83 % (ref 95–99)
SAO2 % BLD: 98 % (ref 92–97)
SAO2 % BLD: 99 % (ref 92–97)
SAO2 % BLDV: 59 % (ref 65–88)
SAO2% DEVICE SAO2% SENSOR NAME: ABNORMAL
SARS-COV-2 PCR, COVPCR: NOT DETECTED
SERVICE CMNT-IMP: ABNORMAL
SERVICE CMNT-IMP: NORMAL
SODIUM SERPL-SCNC: 129 MMOL/L (ref 136–145)
SODIUM SERPL-SCNC: 130 MMOL/L (ref 136–145)
SODIUM SERPL-SCNC: 130 MMOL/L (ref 136–145)
SODIUM SERPL-SCNC: 131 MMOL/L (ref 136–145)
SODIUM SERPL-SCNC: 132 MMOL/L (ref 136–145)
SODIUM SERPL-SCNC: 132 MMOL/L (ref 136–145)
SODIUM SERPL-SCNC: 134 MMOL/L (ref 136–145)
SODIUM SERPL-SCNC: 135 MMOL/L (ref 136–145)
SODIUM SERPL-SCNC: 136 MMOL/L (ref 136–145)
SODIUM SERPL-SCNC: 137 MMOL/L (ref 136–145)
SODIUM SERPL-SCNC: 139 MMOL/L (ref 136–145)
SODIUM SERPL-SCNC: 139 MMOL/L (ref 136–145)
SODIUM SERPL-SCNC: 140 MMOL/L (ref 136–145)
SODIUM SERPL-SCNC: 141 MMOL/L (ref 136–145)
SODIUM SERPL-SCNC: 142 MMOL/L (ref 136–145)
SODIUM SERPL-SCNC: 142 MMOL/L (ref 136–145)
SODIUM UR-SCNC: 103 MMOL/L
SOURCE, COVRS: NORMAL
SP GR UR REFRACTOMETRY: 1.01 (ref 1–1.03)
SPECIMEN EXP DATE BLD: NORMAL
SPECIMEN SITE: ABNORMAL
STATUS OF UNIT,%ST: NORMAL
THERAPEUTIC RANGE,PTTT: ABNORMAL SECS (ref 58–77)
THERAPEUTIC RANGE,PTTT: NORMAL SECS (ref 58–77)
TRIGL SERPL-MCNC: 120 MG/DL (ref ?–150)
TRIGL SERPL-MCNC: 82 MG/DL (ref ?–150)
TROPONIN I SERPL-MCNC: 28.5 NG/ML
TROPONIN I SERPL-MCNC: <0.05 NG/ML
TROPONIN I SERPL-MCNC: <0.05 NG/ML
UA: UC IF INDICATED,UAUC: ABNORMAL
UNIT DIVISION, %UDIV: 0
UROBILINOGEN UR QL STRIP.AUTO: 0.2 EU/DL (ref 0.2–1)
VALID INTERNAL CONTROL?: YES
VENTILATION MODE VENT: ABNORMAL
VENTILATION MODE VENT: ABNORMAL
VENTRICULAR RATE, ECG03: 90 BPM
VENTRICULAR RATE, ECG03: 91 BPM
VENTRICULAR RATE, ECG03: 91 BPM
VLDLC SERPL CALC-MCNC: 16.4 MG/DL
VT SETTING VENT: 450 ML
VT SETTING VENT: 450 ML
WBC # BLD AUTO: 12.5 K/UL (ref 3.6–11)
WBC # BLD AUTO: 12.6 K/UL (ref 3.6–11)
WBC # BLD AUTO: 13.6 K/UL (ref 3.6–11)
WBC # BLD AUTO: 14.6 K/UL (ref 3.6–11)
WBC # BLD AUTO: 14.9 K/UL (ref 3.6–11)
WBC # BLD AUTO: 15.9 K/UL (ref 3.6–11)
WBC # BLD AUTO: 16.5 K/UL (ref 3.6–11)
WBC # BLD AUTO: 20.4 K/UL (ref 3.6–11)
WBC # BLD AUTO: 23.8 K/UL (ref 3.6–11)
WBC # BLD AUTO: 24.6 K/UL (ref 3.6–11)
WBC # BLD AUTO: 27.7 K/UL (ref 3.6–11)
WBC # BLD AUTO: 3.8 K/UL (ref 3.6–11)
WBC # BLD AUTO: 3.9 K/UL (ref 3.6–11)
WBC # BLD AUTO: 4.7 K/UL (ref 3.6–11)
WBC # BLD AUTO: 5.2 K/UL (ref 3.6–11)
WBC # BLD AUTO: 5.3 K/UL (ref 3.6–11)
WBC # BLD AUTO: 5.3 K/UL (ref 3.6–11)
WBC # BLD AUTO: 5.4 K/UL (ref 3.6–11)
WBC # BLD AUTO: 5.6 K/UL (ref 3.6–11)
WBC # BLD AUTO: 5.6 K/UL (ref 3.6–11)
WBC # BLD AUTO: 6.2 K/UL (ref 3.6–11)
WBC # BLD AUTO: 6.3 K/UL (ref 3.6–11)
WBC # BLD AUTO: 7 K/UL (ref 3.6–11)
WBC # BLD AUTO: 7.3 K/UL (ref 3.6–11)
WBC MORPH BLD: ABNORMAL
WBC URNS QL MICRO: ABNORMAL /HPF (ref 0–4)

## 2021-01-01 PROCEDURE — 99233 SBSQ HOSP IP/OBS HIGH 50: CPT | Performed by: INTERNAL MEDICINE

## 2021-01-01 PROCEDURE — 65610000006 HC RM INTENSIVE CARE

## 2021-01-01 PROCEDURE — 71045 X-RAY EXAM CHEST 1 VIEW: CPT

## 2021-01-01 PROCEDURE — 74011250636 HC RX REV CODE- 250/636: Performed by: INTERNAL MEDICINE

## 2021-01-01 PROCEDURE — 77030038269 HC DRN EXT URIN PURWCK BARD -A

## 2021-01-01 PROCEDURE — 83735 ASSAY OF MAGNESIUM: CPT

## 2021-01-01 PROCEDURE — 74011250636 HC RX REV CODE- 250/636: Performed by: NURSE PRACTITIONER

## 2021-01-01 PROCEDURE — 74011000258 HC RX REV CODE- 258: Performed by: NURSE PRACTITIONER

## 2021-01-01 PROCEDURE — 93306 TTE W/DOPPLER COMPLETE: CPT

## 2021-01-01 PROCEDURE — 74011000250 HC RX REV CODE- 250: Performed by: INTERNAL MEDICINE

## 2021-01-01 PROCEDURE — 87040 BLOOD CULTURE FOR BACTERIA: CPT

## 2021-01-01 PROCEDURE — 74011250637 HC RX REV CODE- 250/637: Performed by: INTERNAL MEDICINE

## 2021-01-01 PROCEDURE — 74011636637 HC RX REV CODE- 636/637: Performed by: INTERNAL MEDICINE

## 2021-01-01 PROCEDURE — 85730 THROMBOPLASTIN TIME PARTIAL: CPT

## 2021-01-01 PROCEDURE — 2709999900 HC NON-CHARGEABLE SUPPLY

## 2021-01-01 PROCEDURE — 94003 VENT MGMT INPAT SUBQ DAY: CPT

## 2021-01-01 PROCEDURE — 82962 GLUCOSE BLOOD TEST: CPT

## 2021-01-01 PROCEDURE — 36415 COLL VENOUS BLD VENIPUNCTURE: CPT

## 2021-01-01 PROCEDURE — 85384 FIBRINOGEN ACTIVITY: CPT

## 2021-01-01 PROCEDURE — 74011000250 HC RX REV CODE- 250: Performed by: NURSE PRACTITIONER

## 2021-01-01 PROCEDURE — 77030014006 HC SPNG HEMSTAT J&J -A

## 2021-01-01 PROCEDURE — 80048 BASIC METABOLIC PNL TOTAL CA: CPT

## 2021-01-01 PROCEDURE — 99231 SBSQ HOSP IP/OBS SF/LOW 25: CPT | Performed by: NURSE PRACTITIONER

## 2021-01-01 PROCEDURE — 74011250637 HC RX REV CODE- 250/637: Performed by: STUDENT IN AN ORGANIZED HEALTH CARE EDUCATION/TRAINING PROGRAM

## 2021-01-01 PROCEDURE — 81001 URINALYSIS AUTO W/SCOPE: CPT

## 2021-01-01 PROCEDURE — 4A023N8 MEASUREMENT OF CARDIAC SAMPLING AND PRESSURE, BILATERAL, PERCUTANEOUS APPROACH: ICD-10-PCS | Performed by: INTERNAL MEDICINE

## 2021-01-01 PROCEDURE — 77030012406 HC DRN WND PENRS BARD -A: Performed by: SURGERY

## 2021-01-01 PROCEDURE — 74011250637 HC RX REV CODE- 250/637: Performed by: HOSPITALIST

## 2021-01-01 PROCEDURE — 77030020061 HC IV BLD WRMR ADMIN SET 3M -B: Performed by: ANESTHESIOLOGY

## 2021-01-01 PROCEDURE — 82248 BILIRUBIN DIRECT: CPT

## 2021-01-01 PROCEDURE — 77030018786 HC NDL GD F/USND BARD -B

## 2021-01-01 PROCEDURE — 77030008684 HC TU ET CUF COVD -B: Performed by: ANESTHESIOLOGY

## 2021-01-01 PROCEDURE — 80076 HEPATIC FUNCTION PANEL: CPT

## 2021-01-01 PROCEDURE — 99223 1ST HOSP IP/OBS HIGH 75: CPT | Performed by: THORACIC SURGERY (CARDIOTHORACIC VASCULAR SURGERY)

## 2021-01-01 PROCEDURE — 99291 CRITICAL CARE FIRST HOUR: CPT | Performed by: INTERNAL MEDICINE

## 2021-01-01 PROCEDURE — 77030019569 HC BND COMPR RAD TERU -B: Performed by: INTERNAL MEDICINE

## 2021-01-01 PROCEDURE — 97530 THERAPEUTIC ACTIVITIES: CPT

## 2021-01-01 PROCEDURE — 97530 THERAPEUTIC ACTIVITIES: CPT | Performed by: OCCUPATIONAL THERAPIST

## 2021-01-01 PROCEDURE — 84100 ASSAY OF PHOSPHORUS: CPT

## 2021-01-01 PROCEDURE — 83880 ASSAY OF NATRIURETIC PEPTIDE: CPT

## 2021-01-01 PROCEDURE — 87070 CULTURE OTHR SPECIMN AEROBIC: CPT

## 2021-01-01 PROCEDURE — 84484 ASSAY OF TROPONIN QUANT: CPT

## 2021-01-01 PROCEDURE — P9016 RBC LEUKOCYTES REDUCED: HCPCS

## 2021-01-01 PROCEDURE — 87186 SC STD MICRODIL/AGAR DIL: CPT

## 2021-01-01 PROCEDURE — 74011250636 HC RX REV CODE- 250/636: Performed by: SURGERY

## 2021-01-01 PROCEDURE — 93000 ELECTROCARDIOGRAM COMPLETE: CPT | Performed by: INTERNAL MEDICINE

## 2021-01-01 PROCEDURE — 94660 CPAP INITIATION&MGMT: CPT

## 2021-01-01 PROCEDURE — 97535 SELF CARE MNGMENT TRAINING: CPT | Performed by: OCCUPATIONAL THERAPIST

## 2021-01-01 PROCEDURE — 77030028837 HC SYR ANGI PWR INJ COEU -A: Performed by: INTERNAL MEDICINE

## 2021-01-01 PROCEDURE — C1769 GUIDE WIRE: HCPCS | Performed by: INTERNAL MEDICINE

## 2021-01-01 PROCEDURE — 85027 COMPLETE CBC AUTOMATED: CPT

## 2021-01-01 PROCEDURE — 80069 RENAL FUNCTION PANEL: CPT

## 2021-01-01 PROCEDURE — 85025 COMPLETE CBC W/AUTO DIFF WBC: CPT

## 2021-01-01 PROCEDURE — 02HV33Z INSERTION OF INFUSION DEVICE INTO SUPERIOR VENA CAVA, PERCUTANEOUS APPROACH: ICD-10-PCS | Performed by: INTERNAL MEDICINE

## 2021-01-01 PROCEDURE — C1887 CATHETER, GUIDING: HCPCS | Performed by: INTERNAL MEDICINE

## 2021-01-01 PROCEDURE — 97162 PT EVAL MOD COMPLEX 30 MIN: CPT

## 2021-01-01 PROCEDURE — 74011250637 HC RX REV CODE- 250/637: Performed by: SURGERY

## 2021-01-01 PROCEDURE — 77030008543 HC TBNG MON PRSS MRTM -A: Performed by: INTERNAL MEDICINE

## 2021-01-01 PROCEDURE — 85610 PROTHROMBIN TIME: CPT

## 2021-01-01 PROCEDURE — C1894 INTRO/SHEATH, NON-LASER: HCPCS | Performed by: INTERNAL MEDICINE

## 2021-01-01 PROCEDURE — 74011636637 HC RX REV CODE- 636/637: Performed by: STUDENT IN AN ORGANIZED HEALTH CARE EDUCATION/TRAINING PROGRAM

## 2021-01-01 PROCEDURE — 04CK0ZZ EXTIRPATION OF MATTER FROM RIGHT FEMORAL ARTERY, OPEN APPROACH: ICD-10-PCS | Performed by: SURGERY

## 2021-01-01 PROCEDURE — 75630 X-RAY AORTA LEG ARTERIES: CPT | Performed by: INTERNAL MEDICINE

## 2021-01-01 PROCEDURE — 85014 HEMATOCRIT: CPT

## 2021-01-01 PROCEDURE — 74011250637 HC RX REV CODE- 250/637: Performed by: NURSE PRACTITIONER

## 2021-01-01 PROCEDURE — 99152 MOD SED SAME PHYS/QHP 5/>YRS: CPT | Performed by: INTERNAL MEDICINE

## 2021-01-01 PROCEDURE — 99223 1ST HOSP IP/OBS HIGH 75: CPT | Performed by: INTERNAL MEDICINE

## 2021-01-01 PROCEDURE — 77030019905 HC CATH URETH INTMIT MDII -A

## 2021-01-01 PROCEDURE — 36430 TRANSFUSION BLD/BLD COMPNT: CPT

## 2021-01-01 PROCEDURE — 93005 ELECTROCARDIOGRAM TRACING: CPT

## 2021-01-01 PROCEDURE — 74011000258 HC RX REV CODE- 258: Performed by: INTERNAL MEDICINE

## 2021-01-01 PROCEDURE — 82375 ASSAY CARBOXYHB QUANT: CPT

## 2021-01-01 PROCEDURE — 83605 ASSAY OF LACTIC ACID: CPT

## 2021-01-01 PROCEDURE — 74011000250 HC RX REV CODE- 250: Performed by: EMERGENCY MEDICINE

## 2021-01-01 PROCEDURE — 87077 CULTURE AEROBIC IDENTIFY: CPT

## 2021-01-01 PROCEDURE — 65660000000 HC RM CCU STEPDOWN

## 2021-01-01 PROCEDURE — 65270000029 HC RM PRIVATE

## 2021-01-01 PROCEDURE — 77030031139 HC SUT VCRL2 J&J -A: Performed by: SURGERY

## 2021-01-01 PROCEDURE — 76937 US GUIDE VASCULAR ACCESS: CPT

## 2021-01-01 PROCEDURE — 85347 COAGULATION TIME ACTIVATED: CPT

## 2021-01-01 PROCEDURE — 74011636637 HC RX REV CODE- 636/637: Performed by: NURSE PRACTITIONER

## 2021-01-01 PROCEDURE — 83050 HGB METHEMOGLOBIN QUAN: CPT

## 2021-01-01 PROCEDURE — 75710 ARTERY X-RAYS ARM/LEG: CPT | Performed by: INTERNAL MEDICINE

## 2021-01-01 PROCEDURE — 86923 COMPATIBILITY TEST ELECTRIC: CPT

## 2021-01-01 PROCEDURE — 77030005513 HC CATH URETH FOL11 MDII -B

## 2021-01-01 PROCEDURE — 84145 PROCALCITONIN (PCT): CPT

## 2021-01-01 PROCEDURE — 80061 LIPID PANEL: CPT

## 2021-01-01 PROCEDURE — C1876 STENT, NON-COA/NON-COV W/DEL: HCPCS | Performed by: INTERNAL MEDICINE

## 2021-01-01 PROCEDURE — 74011000250 HC RX REV CODE- 250

## 2021-01-01 PROCEDURE — 93460 R&L HRT ART/VENTRICLE ANGIO: CPT | Performed by: INTERNAL MEDICINE

## 2021-01-01 PROCEDURE — 77030010221 HC SPLNT WR POS TELE -B: Performed by: INTERNAL MEDICINE

## 2021-01-01 PROCEDURE — 77030018798 HC PMP KT ENTRL FED COVD -A

## 2021-01-01 PROCEDURE — C1751 CATH, INF, PER/CENT/MIDLINE: HCPCS

## 2021-01-01 PROCEDURE — C1751 CATH, INF, PER/CENT/MIDLINE: HCPCS | Performed by: INTERNAL MEDICINE

## 2021-01-01 PROCEDURE — 82550 ASSAY OF CK (CPK): CPT

## 2021-01-01 PROCEDURE — 36200 PLACE CATHETER IN AORTA: CPT | Performed by: INTERNAL MEDICINE

## 2021-01-01 PROCEDURE — 76060000034 HC ANESTHESIA 1.5 TO 2 HR: Performed by: SURGERY

## 2021-01-01 PROCEDURE — P9035 PLATELET PHERES LEUKOREDUCED: HCPCS

## 2021-01-01 PROCEDURE — 99285 EMERGENCY DEPT VISIT HI MDM: CPT

## 2021-01-01 PROCEDURE — 84300 ASSAY OF URINE SODIUM: CPT

## 2021-01-01 PROCEDURE — P9045 ALBUMIN (HUMAN), 5%, 250 ML: HCPCS | Performed by: INTERNAL MEDICINE

## 2021-01-01 PROCEDURE — 86901 BLOOD TYPING SEROLOGIC RH(D): CPT

## 2021-01-01 PROCEDURE — 92928 PRQ TCAT PLMT NTRAC ST 1 LES: CPT | Performed by: INTERNAL MEDICINE

## 2021-01-01 PROCEDURE — 94760 N-INVAS EAR/PLS OXIMETRY 1: CPT

## 2021-01-01 PROCEDURE — 80053 COMPREHEN METABOLIC PANEL: CPT

## 2021-01-01 PROCEDURE — 77010033678 HC OXYGEN DAILY

## 2021-01-01 PROCEDURE — 82803 BLOOD GASES ANY COMBINATION: CPT

## 2021-01-01 PROCEDURE — 92941 PRQ TRLML REVSC TOT OCCL AMI: CPT | Performed by: INTERNAL MEDICINE

## 2021-01-01 PROCEDURE — B41F1ZZ FLUOROSCOPY OF RIGHT LOWER EXTREMITY ARTERIES USING LOW OSMOLAR CONTRAST: ICD-10-PCS | Performed by: INTERNAL MEDICINE

## 2021-01-01 PROCEDURE — 77030002986 HC SUT PROL J&J -A: Performed by: SURGERY

## 2021-01-01 PROCEDURE — 74018 RADEX ABDOMEN 1 VIEW: CPT

## 2021-01-01 PROCEDURE — P9059 PLASMA, FRZ BETWEEN 8-24HOUR: HCPCS

## 2021-01-01 PROCEDURE — 99232 SBSQ HOSP IP/OBS MODERATE 35: CPT | Performed by: INTERNAL MEDICINE

## 2021-01-01 PROCEDURE — 97166 OT EVAL MOD COMPLEX 45 MIN: CPT | Performed by: OCCUPATIONAL THERAPIST

## 2021-01-01 PROCEDURE — 94640 AIRWAY INHALATION TREATMENT: CPT

## 2021-01-01 PROCEDURE — 77030002996 HC SUT SLK J&J -A: Performed by: SURGERY

## 2021-01-01 PROCEDURE — C1725 CATH, TRANSLUMIN NON-LASER: HCPCS | Performed by: INTERNAL MEDICINE

## 2021-01-01 PROCEDURE — 94761 N-INVAS EAR/PLS OXIMETRY MLT: CPT

## 2021-01-01 PROCEDURE — 2709999900 HC NON-CHARGEABLE SUPPLY: Performed by: SURGERY

## 2021-01-01 PROCEDURE — 75625 CONTRAST EXAM ABDOMINL AORTA: CPT | Performed by: INTERNAL MEDICINE

## 2021-01-01 PROCEDURE — 4A023N6 MEASUREMENT OF CARDIAC SAMPLING AND PRESSURE, RIGHT HEART, PERCUTANEOUS APPROACH: ICD-10-PCS | Performed by: INTERNAL MEDICINE

## 2021-01-01 PROCEDURE — 77030038692 HC WND DEB SYS IRMX -B: Performed by: SURGERY

## 2021-01-01 PROCEDURE — 74011000636 HC RX REV CODE- 636: Performed by: INTERNAL MEDICINE

## 2021-01-01 PROCEDURE — 77030029065 HC DRSG HEMO QCLOT ZMED -B: Performed by: INTERNAL MEDICINE

## 2021-01-01 PROCEDURE — 87635 SARS-COV-2 COVID-19 AMP PRB: CPT

## 2021-01-01 PROCEDURE — 027034Z DILATION OF CORONARY ARTERY, ONE ARTERY WITH DRUG-ELUTING INTRALUMINAL DEVICE, PERCUTANEOUS APPROACH: ICD-10-PCS | Performed by: INTERNAL MEDICINE

## 2021-01-01 PROCEDURE — 77030008463 HC STPLR SKN PROX J&J -B: Performed by: SURGERY

## 2021-01-01 PROCEDURE — 93306 TTE W/DOPPLER COMPLETE: CPT | Performed by: INTERNAL MEDICINE

## 2021-01-01 PROCEDURE — 99214 OFFICE O/P EST MOD 30 MIN: CPT | Performed by: INTERNAL MEDICINE

## 2021-01-01 PROCEDURE — 77030011256 HC DRSG MEPILEX <16IN NO BORD MOLN -A

## 2021-01-01 PROCEDURE — 77030004549 HC CATH ANGI DX PRF MRTM -A: Performed by: INTERNAL MEDICINE

## 2021-01-01 PROCEDURE — C1874 STENT, COATED/COV W/DEL SYS: HCPCS | Performed by: INTERNAL MEDICINE

## 2021-01-01 PROCEDURE — 77030002916 HC SUT ETHLN J&J -A: Performed by: SURGERY

## 2021-01-01 PROCEDURE — 74011250636 HC RX REV CODE- 250/636: Performed by: NURSE ANESTHETIST, CERTIFIED REGISTERED

## 2021-01-01 PROCEDURE — 77030015766: Performed by: INTERNAL MEDICINE

## 2021-01-01 PROCEDURE — 0202U NFCT DS 22 TRGT SARS-COV-2: CPT

## 2021-01-01 PROCEDURE — 99153 MOD SED SAME PHYS/QHP EA: CPT | Performed by: INTERNAL MEDICINE

## 2021-01-01 PROCEDURE — 77030040922 HC BLNKT HYPOTHRM STRY -A

## 2021-01-01 PROCEDURE — 77030013079 HC BLNKT BAIR HGGR 3M -A: Performed by: ANESTHESIOLOGY

## 2021-01-01 PROCEDURE — 74011000250 HC RX REV CODE- 250: Performed by: HOSPITALIST

## 2021-01-01 PROCEDURE — B4101ZZ FLUOROSCOPY OF ABDOMINAL AORTA USING LOW OSMOLAR CONTRAST: ICD-10-PCS | Performed by: INTERNAL MEDICINE

## 2021-01-01 PROCEDURE — B2111ZZ FLUOROSCOPY OF MULTIPLE CORONARY ARTERIES USING LOW OSMOLAR CONTRAST: ICD-10-PCS | Performed by: INTERNAL MEDICINE

## 2021-01-01 PROCEDURE — 1111F DSCHRG MED/CURRENT MED MERGE: CPT | Performed by: INTERNAL MEDICINE

## 2021-01-01 PROCEDURE — 99222 1ST HOSP IP/OBS MODERATE 55: CPT | Performed by: NURSE PRACTITIONER

## 2021-01-01 PROCEDURE — 96374 THER/PROPH/DIAG INJ IV PUSH: CPT

## 2021-01-01 PROCEDURE — 83036 HEMOGLOBIN GLYCOSYLATED A1C: CPT

## 2021-01-01 PROCEDURE — 77030022017 HC DRSG HEMO QCLOT ZMED -A

## 2021-01-01 PROCEDURE — 76010000153 HC OR TIME 1.5 TO 2 HR: Performed by: SURGERY

## 2021-01-01 PROCEDURE — 94002 VENT MGMT INPAT INIT DAY: CPT

## 2021-01-01 PROCEDURE — 03HY32Z INSERTION OF MONITORING DEVICE INTO UPPER ARTERY, PERCUTANEOUS APPROACH: ICD-10-PCS | Performed by: INTERNAL MEDICINE

## 2021-01-01 PROCEDURE — 99233 SBSQ HOSP IP/OBS HIGH 50: CPT | Performed by: NURSE PRACTITIONER

## 2021-01-01 PROCEDURE — 74011000250 HC RX REV CODE- 250: Performed by: NURSE ANESTHETIST, CERTIFIED REGISTERED

## 2021-01-01 PROCEDURE — 84478 ASSAY OF TRIGLYCERIDES: CPT

## 2021-01-01 PROCEDURE — 85610 PROTHROMBIN TIME: CPT | Performed by: INTERNAL MEDICINE

## 2021-01-01 PROCEDURE — 75716 ARTERY X-RAYS ARMS/LEGS: CPT | Performed by: INTERNAL MEDICINE

## 2021-01-01 PROCEDURE — 77030019698 HC SYR ANGI MDLON MRTM -A: Performed by: INTERNAL MEDICINE

## 2021-01-01 PROCEDURE — 99232 SBSQ HOSP IP/OBS MODERATE 35: CPT | Performed by: NURSE PRACTITIONER

## 2021-01-01 DEVICE — STENT RONYX27515UX RESOLUTE ONYX 2.75X15
Type: IMPLANTABLE DEVICE | Status: FUNCTIONAL
Brand: RESOLUTE ONYX™

## 2021-01-01 RX ORDER — VANCOMYCIN 2 GRAM/500 ML IN 0.9 % SODIUM CHLORIDE INTRAVENOUS
2000
Status: COMPLETED | OUTPATIENT
Start: 2021-01-01 | End: 2021-01-01

## 2021-01-01 RX ORDER — SODIUM CHLORIDE 9 MG/ML
250 INJECTION, SOLUTION INTRAVENOUS AS NEEDED
Status: DISCONTINUED | OUTPATIENT
Start: 2021-01-01 | End: 2021-01-01

## 2021-01-01 RX ORDER — CARVEDILOL 12.5 MG/1
12.5 TABLET ORAL 2 TIMES DAILY WITH MEALS
Status: DISCONTINUED | OUTPATIENT
Start: 2021-01-01 | End: 2021-01-01 | Stop reason: HOSPADM

## 2021-01-01 RX ORDER — CHLORHEXIDINE GLUCONATE 0.12 MG/ML
15 RINSE ORAL EVERY 12 HOURS
Status: DISCONTINUED | OUTPATIENT
Start: 2021-01-01 | End: 2021-01-01 | Stop reason: ALTCHOICE

## 2021-01-01 RX ORDER — ACETAMINOPHEN 325 MG/1
650 TABLET ORAL
Status: DISCONTINUED | OUTPATIENT
Start: 2021-01-01 | End: 2021-01-01

## 2021-01-01 RX ORDER — INSULIN LISPRO 100 [IU]/ML
INJECTION, SOLUTION INTRAVENOUS; SUBCUTANEOUS EVERY 6 HOURS
Status: DISCONTINUED | OUTPATIENT
Start: 2021-01-01 | End: 2021-01-01 | Stop reason: HOSPADM

## 2021-01-01 RX ORDER — OXYCODONE HYDROCHLORIDE 5 MG/1
10 TABLET ORAL
Status: DISCONTINUED | OUTPATIENT
Start: 2021-01-01 | End: 2021-01-01 | Stop reason: HOSPADM

## 2021-01-01 RX ORDER — PREDNISONE 20 MG/1
40 TABLET ORAL 2 TIMES DAILY WITH MEALS
Status: DISCONTINUED | OUTPATIENT
Start: 2021-01-01 | End: 2021-01-01

## 2021-01-01 RX ORDER — POTASSIUM CHLORIDE 1.5 G/1.77G
40 POWDER, FOR SOLUTION ORAL
Status: COMPLETED | OUTPATIENT
Start: 2021-01-01 | End: 2021-01-01

## 2021-01-01 RX ORDER — HEPARIN SODIUM 200 [USP'U]/100ML
INJECTION, SOLUTION INTRAVENOUS
Status: COMPLETED | OUTPATIENT
Start: 2021-01-01 | End: 2021-01-01

## 2021-01-01 RX ORDER — HEPARIN 100 UNIT/ML
300 SYRINGE INTRAVENOUS AS NEEDED
Status: CANCELLED | OUTPATIENT
Start: 2021-01-01

## 2021-01-01 RX ORDER — FENTANYL CITRATE 50 UG/ML
INJECTION, SOLUTION INTRAMUSCULAR; INTRAVENOUS AS NEEDED
Status: DISCONTINUED | OUTPATIENT
Start: 2021-01-01 | End: 2021-01-01 | Stop reason: HOSPADM

## 2021-01-01 RX ORDER — ACETAMINOPHEN 325 MG/1
650 TABLET ORAL
Status: DISCONTINUED | OUTPATIENT
Start: 2021-01-01 | End: 2021-01-01 | Stop reason: HOSPADM

## 2021-01-01 RX ORDER — CARVEDILOL 3.12 MG/1
3.12 TABLET ORAL 2 TIMES DAILY WITH MEALS
Status: DISCONTINUED | OUTPATIENT
Start: 2021-01-01 | End: 2021-01-01

## 2021-01-01 RX ORDER — MELATONIN
1000 DAILY
COMMUNITY

## 2021-01-01 RX ORDER — OXYCODONE HYDROCHLORIDE 5 MG/1
5 TABLET ORAL
Status: DISCONTINUED | OUTPATIENT
Start: 2021-01-01 | End: 2021-01-01 | Stop reason: HOSPADM

## 2021-01-01 RX ORDER — SODIUM CHLORIDE 0.9 % (FLUSH) 0.9 %
5-40 SYRINGE (ML) INJECTION EVERY 8 HOURS
Status: DISCONTINUED | OUTPATIENT
Start: 2021-01-01 | End: 2021-01-01 | Stop reason: HOSPADM

## 2021-01-01 RX ORDER — BUMETANIDE 0.25 MG/ML
2 INJECTION INTRAMUSCULAR; INTRAVENOUS 2 TIMES DAILY
Status: COMPLETED | OUTPATIENT
Start: 2021-01-01 | End: 2021-01-01

## 2021-01-01 RX ORDER — HYDRALAZINE HYDROCHLORIDE 50 MG/1
100 TABLET, FILM COATED ORAL 3 TIMES DAILY
Status: DISCONTINUED | OUTPATIENT
Start: 2021-01-01 | End: 2021-01-01

## 2021-01-01 RX ORDER — NITROGLYCERIN 0.4 MG/1
0.4 TABLET SUBLINGUAL AS NEEDED
Status: DISCONTINUED | OUTPATIENT
Start: 2021-01-01 | End: 2021-01-01

## 2021-01-01 RX ORDER — POTASSIUM CHLORIDE 29.8 MG/ML
20 INJECTION INTRAVENOUS ONCE
Status: COMPLETED | OUTPATIENT
Start: 2021-01-01 | End: 2021-01-01

## 2021-01-01 RX ORDER — HEPARIN SODIUM 10000 [USP'U]/100ML
18-36 INJECTION, SOLUTION INTRAVENOUS
Status: DISCONTINUED | OUTPATIENT
Start: 2021-01-01 | End: 2021-01-01

## 2021-01-01 RX ORDER — FENTANYL CITRATE 50 UG/ML
50 INJECTION, SOLUTION INTRAMUSCULAR; INTRAVENOUS
Status: DISCONTINUED | OUTPATIENT
Start: 2021-01-01 | End: 2021-01-01 | Stop reason: SDUPTHER

## 2021-01-01 RX ORDER — CARVEDILOL 12.5 MG/1
12.5 TABLET ORAL 2 TIMES DAILY WITH MEALS
Status: DISCONTINUED | OUTPATIENT
Start: 2021-01-01 | End: 2021-01-01

## 2021-01-01 RX ORDER — ACETAMINOPHEN 650 MG/1
650 SUPPOSITORY RECTAL
Status: DISCONTINUED | OUTPATIENT
Start: 2021-01-01 | End: 2021-01-01 | Stop reason: HOSPADM

## 2021-01-01 RX ORDER — HEPARIN SODIUM 5000 [USP'U]/ML
4000 INJECTION, SOLUTION INTRAVENOUS; SUBCUTANEOUS AS NEEDED
Status: DISCONTINUED | OUTPATIENT
Start: 2021-01-01 | End: 2021-01-01 | Stop reason: ALTCHOICE

## 2021-01-01 RX ORDER — DIPHENHYDRAMINE HCL 25 MG
25 CAPSULE ORAL 2 TIMES DAILY
Status: DISCONTINUED | OUTPATIENT
Start: 2021-01-01 | End: 2021-01-01

## 2021-01-01 RX ORDER — PHENYLEPHRINE HCL IN 0.9% NACL 0.4MG/10ML
200 SYRINGE (ML) INTRAVENOUS ONCE
Status: COMPLETED | OUTPATIENT
Start: 2021-01-01 | End: 2021-01-01

## 2021-01-01 RX ORDER — PROPOFOL 10 MG/ML
0-50 VIAL (ML) INTRAVENOUS
Status: DISCONTINUED | OUTPATIENT
Start: 2021-01-01 | End: 2021-01-01

## 2021-01-01 RX ORDER — SUCCINYLCHOLINE CHLORIDE 20 MG/ML
INJECTION INTRAMUSCULAR; INTRAVENOUS AS NEEDED
Status: DISCONTINUED | OUTPATIENT
Start: 2021-01-01 | End: 2021-01-01 | Stop reason: HOSPADM

## 2021-01-01 RX ORDER — DEXTROSE MONOHYDRATE AND SODIUM CHLORIDE 5; .9 G/100ML; G/100ML
50 INJECTION, SOLUTION INTRAVENOUS CONTINUOUS
Status: DISCONTINUED | OUTPATIENT
Start: 2021-01-01 | End: 2021-01-01

## 2021-01-01 RX ORDER — INSULIN LISPRO 100 [IU]/ML
INJECTION, SOLUTION INTRAVENOUS; SUBCUTANEOUS EVERY 6 HOURS
Status: DISCONTINUED | OUTPATIENT
Start: 2021-01-01 | End: 2021-01-01

## 2021-01-01 RX ORDER — SODIUM CHLORIDE 9 MG/ML
250 INJECTION, SOLUTION INTRAVENOUS AS NEEDED
Status: DISCONTINUED | OUTPATIENT
Start: 2021-01-01 | End: 2021-01-01 | Stop reason: HOSPADM

## 2021-01-01 RX ORDER — FENTANYL CITRATE 50 UG/ML
25 INJECTION, SOLUTION INTRAMUSCULAR; INTRAVENOUS
Status: DISCONTINUED | OUTPATIENT
Start: 2021-01-01 | End: 2021-01-01

## 2021-01-01 RX ORDER — DEXTROSE 50 % IN WATER (D50W) INTRAVENOUS SYRINGE
12.5-25 AS NEEDED
Status: DISCONTINUED | OUTPATIENT
Start: 2021-01-01 | End: 2021-01-01 | Stop reason: HOSPADM

## 2021-01-01 RX ORDER — PHENYLEPHRINE HYDROCHLORIDE 10 MG/ML
200 INJECTION INTRAVENOUS ONCE
Status: DISCONTINUED | OUTPATIENT
Start: 2021-01-01 | End: 2021-01-01 | Stop reason: SDUPTHER

## 2021-01-01 RX ORDER — VANCOMYCIN 2 GRAM/500 ML IN 0.9 % SODIUM CHLORIDE INTRAVENOUS
2000 ONCE
Status: COMPLETED | OUTPATIENT
Start: 2021-01-01 | End: 2021-01-01

## 2021-01-01 RX ORDER — ALBUMIN HUMAN 50 G/1000ML
25 SOLUTION INTRAVENOUS ONCE
Status: COMPLETED | OUTPATIENT
Start: 2021-01-01 | End: 2021-01-01

## 2021-01-01 RX ORDER — INSULIN GLARGINE 100 [IU]/ML
12 INJECTION, SOLUTION SUBCUTANEOUS EVERY 12 HOURS
Status: DISCONTINUED | OUTPATIENT
Start: 2021-01-01 | End: 2021-01-01

## 2021-01-01 RX ORDER — BUMETANIDE 0.25 MG/ML
1 INJECTION INTRAMUSCULAR; INTRAVENOUS ONCE
Status: COMPLETED | OUTPATIENT
Start: 2021-01-01 | End: 2021-01-01

## 2021-01-01 RX ORDER — ALBUMIN HUMAN 50 G/1000ML
25 SOLUTION INTRAVENOUS ONCE
Status: DISCONTINUED | OUTPATIENT
Start: 2021-01-01 | End: 2021-01-01

## 2021-01-01 RX ORDER — LIDOCAINE HYDROCHLORIDE 20 MG/ML
INJECTION, SOLUTION EPIDURAL; INFILTRATION; INTRACAUDAL; PERINEURAL AS NEEDED
Status: DISCONTINUED | OUTPATIENT
Start: 2021-01-01 | End: 2021-01-01 | Stop reason: HOSPADM

## 2021-01-01 RX ORDER — GUAIFENESIN 100 MG/5ML
81 LIQUID (ML) ORAL DAILY
Status: DISCONTINUED | OUTPATIENT
Start: 2021-01-01 | End: 2021-01-01

## 2021-01-01 RX ORDER — HEPARIN SODIUM 5000 [USP'U]/ML
3000 INJECTION, SOLUTION INTRAVENOUS; SUBCUTANEOUS ONCE
Status: COMPLETED | OUTPATIENT
Start: 2021-01-01 | End: 2021-01-01

## 2021-01-01 RX ORDER — HEPARIN SODIUM 5000 [USP'U]/ML
2000 INJECTION, SOLUTION INTRAVENOUS; SUBCUTANEOUS AS NEEDED
Status: DISCONTINUED | OUTPATIENT
Start: 2021-01-01 | End: 2021-01-01 | Stop reason: ALTCHOICE

## 2021-01-01 RX ORDER — FAMOTIDINE 20 MG/1
20 TABLET, FILM COATED ORAL DAILY
Status: DISCONTINUED | OUTPATIENT
Start: 2021-01-01 | End: 2021-01-01

## 2021-01-01 RX ORDER — WARFARIN 1 MG/1
1 TABLET ORAL
Status: DISCONTINUED | OUTPATIENT
Start: 2021-01-01 | End: 2021-01-01

## 2021-01-01 RX ORDER — BUMETANIDE 1 MG/1
1 TABLET ORAL 2 TIMES DAILY
Status: DISCONTINUED | OUTPATIENT
Start: 2021-01-01 | End: 2021-01-01

## 2021-01-01 RX ORDER — NALOXONE HYDROCHLORIDE 0.4 MG/ML
0.4 INJECTION, SOLUTION INTRAMUSCULAR; INTRAVENOUS; SUBCUTANEOUS AS NEEDED
Status: DISCONTINUED | OUTPATIENT
Start: 2021-01-01 | End: 2021-01-01

## 2021-01-01 RX ORDER — DEXTROSE 50 % IN WATER (D50W) INTRAVENOUS SYRINGE
12.5-25 AS NEEDED
Status: DISCONTINUED | OUTPATIENT
Start: 2021-01-01 | End: 2021-01-01 | Stop reason: SDUPTHER

## 2021-01-01 RX ORDER — FAMOTIDINE 20 MG/1
20 TABLET, FILM COATED ORAL 2 TIMES DAILY
Status: DISCONTINUED | OUTPATIENT
Start: 2021-01-01 | End: 2021-01-01

## 2021-01-01 RX ORDER — PANTOPRAZOLE SODIUM 40 MG/1
40 TABLET, DELAYED RELEASE ORAL
Status: DISCONTINUED | OUTPATIENT
Start: 2021-01-01 | End: 2021-01-01

## 2021-01-01 RX ORDER — INSULIN GLARGINE 100 [IU]/ML
10 INJECTION, SOLUTION SUBCUTANEOUS
Status: DISCONTINUED | OUTPATIENT
Start: 2021-01-01 | End: 2021-01-01

## 2021-01-01 RX ORDER — WARFARIN 2 MG/1
2 TABLET ORAL EVERY EVENING
Status: DISCONTINUED | OUTPATIENT
Start: 2021-01-01 | End: 2021-01-01 | Stop reason: SDUPTHER

## 2021-01-01 RX ORDER — DOBUTAMINE HYDROCHLORIDE 400 MG/100ML
0-10 INJECTION INTRAVENOUS
Status: DISPENSED | OUTPATIENT
Start: 2021-01-01 | End: 2021-01-01

## 2021-01-01 RX ORDER — POLYETHYLENE GLYCOL 3350 17 G/17G
17 POWDER, FOR SOLUTION ORAL DAILY PRN
Status: DISCONTINUED | OUTPATIENT
Start: 2021-01-01 | End: 2021-01-01 | Stop reason: HOSPADM

## 2021-01-01 RX ORDER — POTASSIUM CHLORIDE 750 MG/1
40 TABLET, FILM COATED, EXTENDED RELEASE ORAL
Status: DISCONTINUED | OUTPATIENT
Start: 2021-01-01 | End: 2021-01-01

## 2021-01-01 RX ORDER — POTASSIUM CHLORIDE 750 MG/1
40 TABLET, FILM COATED, EXTENDED RELEASE ORAL
Status: COMPLETED | OUTPATIENT
Start: 2021-01-01 | End: 2021-01-01

## 2021-01-01 RX ORDER — HYDRALAZINE HYDROCHLORIDE 20 MG/ML
10 INJECTION INTRAMUSCULAR; INTRAVENOUS
Status: DISCONTINUED | OUTPATIENT
Start: 2021-01-01 | End: 2021-01-01 | Stop reason: HOSPADM

## 2021-01-01 RX ORDER — ETOMIDATE 2 MG/ML
INJECTION INTRAVENOUS AS NEEDED
Status: DISCONTINUED | OUTPATIENT
Start: 2021-01-01 | End: 2021-01-01 | Stop reason: HOSPADM

## 2021-01-01 RX ORDER — WARFARIN 1 MG/1
TABLET ORAL
Qty: 45 TAB | Refills: 2
Start: 2021-01-01 | End: 2021-01-01

## 2021-01-01 RX ORDER — MAGNESIUM SULFATE HEPTAHYDRATE 40 MG/ML
4 INJECTION, SOLUTION INTRAVENOUS ONCE
Status: DISCONTINUED | OUTPATIENT
Start: 2021-01-01 | End: 2021-01-01 | Stop reason: CLARIF

## 2021-01-01 RX ORDER — SODIUM CHLORIDE 0.9 % (FLUSH) 0.9 %
5-40 SYRINGE (ML) INJECTION AS NEEDED
Status: DISCONTINUED | OUTPATIENT
Start: 2021-01-01 | End: 2021-01-01 | Stop reason: HOSPADM

## 2021-01-01 RX ORDER — INSULIN LISPRO 100 [IU]/ML
INJECTION, SOLUTION INTRAVENOUS; SUBCUTANEOUS
Status: DISCONTINUED | OUTPATIENT
Start: 2021-01-01 | End: 2021-01-01

## 2021-01-01 RX ORDER — MAGNESIUM SULFATE 100 %
4 CRYSTALS MISCELLANEOUS AS NEEDED
Status: DISCONTINUED | OUTPATIENT
Start: 2021-01-01 | End: 2021-01-01

## 2021-01-01 RX ORDER — LIDOCAINE HYDROCHLORIDE 10 MG/ML
INJECTION INFILTRATION; PERINEURAL AS NEEDED
Status: DISCONTINUED | OUTPATIENT
Start: 2021-01-01 | End: 2021-01-01 | Stop reason: HOSPADM

## 2021-01-01 RX ORDER — IPRATROPIUM BROMIDE AND ALBUTEROL SULFATE 2.5; .5 MG/3ML; MG/3ML
3 SOLUTION RESPIRATORY (INHALATION)
Status: COMPLETED | OUTPATIENT
Start: 2021-01-01 | End: 2021-01-01

## 2021-01-01 RX ORDER — BUMETANIDE 0.25 MG/ML
2 INJECTION INTRAMUSCULAR; INTRAVENOUS
Status: COMPLETED | OUTPATIENT
Start: 2021-01-01 | End: 2021-01-01

## 2021-01-01 RX ORDER — PROPOFOL 10 MG/ML
INJECTION, EMULSION INTRAVENOUS
Status: DISCONTINUED | OUTPATIENT
Start: 2021-01-01 | End: 2021-01-01 | Stop reason: HOSPADM

## 2021-01-01 RX ORDER — MIDAZOLAM HYDROCHLORIDE 1 MG/ML
INJECTION, SOLUTION INTRAMUSCULAR; INTRAVENOUS AS NEEDED
Status: DISCONTINUED | OUTPATIENT
Start: 2021-01-01 | End: 2021-01-01 | Stop reason: HOSPADM

## 2021-01-01 RX ORDER — SODIUM CHLORIDE 9 MG/ML
250 INJECTION, SOLUTION INTRAVENOUS AS NEEDED
Status: DISCONTINUED | OUTPATIENT
Start: 2021-01-01 | End: 2021-01-01 | Stop reason: ALTCHOICE

## 2021-01-01 RX ORDER — DEXTROSE MONOHYDRATE 100 MG/ML
50 INJECTION, SOLUTION INTRAVENOUS CONTINUOUS
Status: DISCONTINUED | OUTPATIENT
Start: 2021-01-01 | End: 2021-01-01

## 2021-01-01 RX ORDER — ROCURONIUM BROMIDE 10 MG/ML
INJECTION, SOLUTION INTRAVENOUS AS NEEDED
Status: DISCONTINUED | OUTPATIENT
Start: 2021-01-01 | End: 2021-01-01 | Stop reason: HOSPADM

## 2021-01-01 RX ORDER — PROMETHAZINE HYDROCHLORIDE 25 MG/1
12.5 TABLET ORAL
Status: DISCONTINUED | OUTPATIENT
Start: 2021-01-01 | End: 2021-01-01

## 2021-01-01 RX ORDER — WATER FOR INJECTION,STERILE
VIAL (ML) INJECTION
Status: DISPENSED
Start: 2021-01-01 | End: 2021-01-01

## 2021-01-01 RX ORDER — ONDANSETRON 2 MG/ML
4 INJECTION INTRAMUSCULAR; INTRAVENOUS
Status: DISCONTINUED | OUTPATIENT
Start: 2021-01-01 | End: 2021-01-01 | Stop reason: HOSPADM

## 2021-01-01 RX ORDER — LANOLIN ALCOHOL/MO/W.PET/CERES
500 CREAM (GRAM) TOPICAL DAILY
COMMUNITY

## 2021-01-01 RX ORDER — FENTANYL CITRATE 50 UG/ML
25-50 INJECTION, SOLUTION INTRAMUSCULAR; INTRAVENOUS
Status: DISCONTINUED | OUTPATIENT
Start: 2021-01-01 | End: 2021-01-01 | Stop reason: HOSPADM

## 2021-01-01 RX ORDER — INSULIN GLARGINE 100 [IU]/ML
15 INJECTION, SOLUTION SUBCUTANEOUS EVERY 12 HOURS
Status: DISCONTINUED | OUTPATIENT
Start: 2021-01-01 | End: 2021-01-01

## 2021-01-01 RX ORDER — IPRATROPIUM BROMIDE AND ALBUTEROL SULFATE 2.5; .5 MG/3ML; MG/3ML
3 SOLUTION RESPIRATORY (INHALATION)
Status: DISCONTINUED | OUTPATIENT
Start: 2021-01-01 | End: 2021-01-01 | Stop reason: HOSPADM

## 2021-01-01 RX ORDER — ATORVASTATIN CALCIUM 20 MG/1
10 TABLET, FILM COATED ORAL DAILY
Status: DISCONTINUED | OUTPATIENT
Start: 2021-01-01 | End: 2021-01-01

## 2021-01-01 RX ORDER — CLOPIDOGREL BISULFATE 75 MG/1
75 TABLET ORAL DAILY
Status: DISCONTINUED | OUTPATIENT
Start: 2021-01-01 | End: 2021-01-01

## 2021-01-01 RX ORDER — SODIUM BICARBONATE 1 MEQ/ML
50 SYRINGE (ML) INTRAVENOUS ONCE
Status: COMPLETED | OUTPATIENT
Start: 2021-01-01 | End: 2021-01-01

## 2021-01-01 RX ORDER — WARFARIN 2 MG/1
4 TABLET ORAL ONCE
Status: DISCONTINUED | OUTPATIENT
Start: 2021-01-01 | End: 2021-01-01

## 2021-01-01 RX ORDER — ALBUMIN HUMAN 250 G/1000ML
12.5 SOLUTION INTRAVENOUS ONCE
Status: DISCONTINUED | OUTPATIENT
Start: 2021-01-01 | End: 2021-01-01

## 2021-01-01 RX ORDER — BUMETANIDE 0.25 MG/ML
1 INJECTION INTRAMUSCULAR; INTRAVENOUS 2 TIMES DAILY
Status: DISCONTINUED | OUTPATIENT
Start: 2021-01-01 | End: 2021-01-01

## 2021-01-01 RX ORDER — BUMETANIDE 1 MG/1
1 TABLET ORAL 2 TIMES DAILY
Qty: 60 TAB | Refills: 0 | Status: SHIPPED | OUTPATIENT
Start: 2021-01-01 | End: 2021-01-01

## 2021-01-01 RX ORDER — WARFARIN 2 MG/1
4 TABLET ORAL ONCE
Status: COMPLETED | OUTPATIENT
Start: 2021-01-01 | End: 2021-01-01

## 2021-01-01 RX ORDER — HEPARIN SODIUM 5000 [USP'U]/ML
5000 INJECTION, SOLUTION INTRAVENOUS; SUBCUTANEOUS EVERY 8 HOURS
Status: DISCONTINUED | OUTPATIENT
Start: 2021-01-01 | End: 2021-01-01

## 2021-01-01 RX ORDER — SODIUM CHLORIDE 9 MG/ML
50 INJECTION, SOLUTION INTRAVENOUS CONTINUOUS
Status: DISCONTINUED | OUTPATIENT
Start: 2021-01-01 | End: 2021-01-01

## 2021-01-01 RX ORDER — POTASSIUM CHLORIDE 29.8 MG/ML
20 INJECTION INTRAVENOUS
Status: COMPLETED | OUTPATIENT
Start: 2021-01-01 | End: 2021-01-01

## 2021-01-01 RX ORDER — POTASSIUM CHLORIDE 29.8 MG/ML
20 INJECTION INTRAVENOUS ONCE
Status: DISCONTINUED | OUTPATIENT
Start: 2021-01-01 | End: 2021-01-01

## 2021-01-01 RX ORDER — POTASSIUM CHLORIDE 7.45 MG/ML
10 INJECTION INTRAVENOUS ONCE
Status: COMPLETED | OUTPATIENT
Start: 2021-01-01 | End: 2021-01-01

## 2021-01-01 RX ORDER — PHENYLEPHRINE HCL IN 0.9% NACL 0.4MG/10ML
SYRINGE (ML) INTRAVENOUS AS NEEDED
Status: DISCONTINUED | OUTPATIENT
Start: 2021-01-01 | End: 2021-01-01

## 2021-01-01 RX ORDER — SODIUM BICARBONATE 650 MG/1
650 TABLET ORAL 3 TIMES DAILY
Status: DISCONTINUED | OUTPATIENT
Start: 2021-01-01 | End: 2021-01-01

## 2021-01-01 RX ORDER — QUETIAPINE FUMARATE 25 MG/1
25 TABLET, FILM COATED ORAL 2 TIMES DAILY
Qty: 60 TAB | Refills: 0 | Status: SHIPPED | OUTPATIENT
Start: 2021-01-01 | End: 2021-01-01

## 2021-01-01 RX ORDER — FUROSEMIDE 10 MG/ML
40 INJECTION INTRAMUSCULAR; INTRAVENOUS 2 TIMES DAILY
Status: DISCONTINUED | OUTPATIENT
Start: 2021-01-01 | End: 2021-01-01

## 2021-01-01 RX ORDER — FENTANYL CITRATE 50 UG/ML
50 INJECTION, SOLUTION INTRAMUSCULAR; INTRAVENOUS
Status: DISCONTINUED | OUTPATIENT
Start: 2021-01-01 | End: 2021-01-01

## 2021-01-01 RX ORDER — HEPARIN SODIUM 5000 [USP'U]/ML
4000 INJECTION, SOLUTION INTRAVENOUS; SUBCUTANEOUS AS NEEDED
Status: DISCONTINUED | OUTPATIENT
Start: 2021-01-01 | End: 2021-01-01

## 2021-01-01 RX ORDER — BUMETANIDE 0.25 MG/ML
2 INJECTION INTRAMUSCULAR; INTRAVENOUS 2 TIMES DAILY
Status: DISCONTINUED | OUTPATIENT
Start: 2021-01-01 | End: 2021-01-01 | Stop reason: HOSPADM

## 2021-01-01 RX ORDER — IODIXANOL 320 MG/ML
INJECTION, SOLUTION INTRAVASCULAR AS NEEDED
Status: DISCONTINUED | OUTPATIENT
Start: 2021-01-01 | End: 2021-01-01 | Stop reason: HOSPADM

## 2021-01-01 RX ORDER — AZITHROMYCIN 250 MG/1
TABLET, FILM COATED ORAL
Qty: 6 TAB | Refills: 0 | Status: SHIPPED | OUTPATIENT
Start: 2021-01-01 | End: 2021-01-01

## 2021-01-01 RX ORDER — MILRINONE LACTATE 0.2 MG/ML
INJECTION, SOLUTION INTRAVENOUS
Status: DISCONTINUED | OUTPATIENT
Start: 2021-01-01 | End: 2021-01-01 | Stop reason: HOSPADM

## 2021-01-01 RX ORDER — IPRATROPIUM BROMIDE AND ALBUTEROL SULFATE 2.5; .5 MG/3ML; MG/3ML
SOLUTION RESPIRATORY (INHALATION)
Status: COMPLETED
Start: 2021-01-01 | End: 2021-01-01

## 2021-01-01 RX ORDER — METOLAZONE 2.5 MG/1
2.5 TABLET ORAL DAILY
Status: DISCONTINUED | OUTPATIENT
Start: 2021-01-01 | End: 2021-01-01

## 2021-01-01 RX ORDER — BALSAM PERU/CASTOR OIL
OINTMENT (GRAM) TOPICAL 2 TIMES DAILY
Status: DISCONTINUED | OUTPATIENT
Start: 2021-01-01 | End: 2021-01-01 | Stop reason: HOSPADM

## 2021-01-01 RX ORDER — CARVEDILOL 12.5 MG/1
12.5 TABLET ORAL 2 TIMES DAILY WITH MEALS
Qty: 60 TAB | Refills: 0 | Status: SHIPPED | OUTPATIENT
Start: 2021-01-01 | End: 2021-01-01

## 2021-01-01 RX ORDER — NOREPINEPHRINE BITARTRATE/D5W 8 MG/250ML
.5-1 PLASTIC BAG, INJECTION (ML) INTRAVENOUS
Status: DISPENSED | OUTPATIENT
Start: 2021-01-01 | End: 2021-01-01

## 2021-01-01 RX ORDER — PROPOFOL 10 MG/ML
INJECTION, EMULSION INTRAVENOUS
Status: COMPLETED
Start: 2021-01-01 | End: 2021-01-01

## 2021-01-01 RX ORDER — PANTOPRAZOLE SODIUM 40 MG/1
40 TABLET, DELAYED RELEASE ORAL
Status: DISCONTINUED | OUTPATIENT
Start: 2021-01-01 | End: 2021-01-01 | Stop reason: HOSPADM

## 2021-01-01 RX ORDER — ONDANSETRON 2 MG/ML
4 INJECTION INTRAMUSCULAR; INTRAVENOUS
Status: DISCONTINUED | OUTPATIENT
Start: 2021-01-01 | End: 2021-01-01 | Stop reason: SDUPTHER

## 2021-01-01 RX ORDER — VANCOMYCIN/0.9 % SOD CHLORIDE 1.5G/250ML
1500 PLASTIC BAG, INJECTION (ML) INTRAVENOUS
Status: DISCONTINUED | OUTPATIENT
Start: 2021-01-01 | End: 2021-01-01

## 2021-01-01 RX ORDER — WARFARIN 2 MG/1
4 TABLET ORAL
Status: COMPLETED | OUTPATIENT
Start: 2021-01-01 | End: 2021-01-01

## 2021-01-01 RX ORDER — SODIUM CHLORIDE 9 MG/ML
75 INJECTION, SOLUTION INTRAVENOUS CONTINUOUS
Status: DISCONTINUED | OUTPATIENT
Start: 2021-01-01 | End: 2021-01-01

## 2021-01-01 RX ORDER — HEPARIN SODIUM 10000 [USP'U]/100ML
8-25 INJECTION, SOLUTION INTRAVENOUS
Status: DISCONTINUED | OUTPATIENT
Start: 2021-01-01 | End: 2021-01-01

## 2021-01-01 RX ORDER — INSULIN LISPRO 100 [IU]/ML
5 INJECTION, SOLUTION INTRAVENOUS; SUBCUTANEOUS
Status: DISCONTINUED | OUTPATIENT
Start: 2021-01-01 | End: 2021-01-01

## 2021-01-01 RX ORDER — MAGNESIUM SULFATE HEPTAHYDRATE 40 MG/ML
2 INJECTION, SOLUTION INTRAVENOUS
Status: COMPLETED | OUTPATIENT
Start: 2021-01-01 | End: 2021-01-01

## 2021-01-01 RX ORDER — BACITRACIN 500 UNIT/G
1 PACKET (EA) TOPICAL AS NEEDED
Status: CANCELLED | OUTPATIENT
Start: 2021-01-01

## 2021-01-01 RX ORDER — PROMETHAZINE HYDROCHLORIDE 25 MG/1
12.5 TABLET ORAL
Status: DISCONTINUED | OUTPATIENT
Start: 2021-01-01 | End: 2021-01-01 | Stop reason: HOSPADM

## 2021-01-01 RX ORDER — ASPIRIN 81 MG/1
81 TABLET ORAL DAILY
Status: DISCONTINUED | OUTPATIENT
Start: 2021-01-01 | End: 2021-01-01

## 2021-01-01 RX ORDER — HEPARIN SODIUM 5000 [USP'U]/ML
2000 INJECTION, SOLUTION INTRAVENOUS; SUBCUTANEOUS AS NEEDED
Status: DISCONTINUED | OUTPATIENT
Start: 2021-01-01 | End: 2021-01-01

## 2021-01-01 RX ORDER — SERTRALINE HYDROCHLORIDE 50 MG/1
125 TABLET, FILM COATED ORAL DAILY
Status: DISCONTINUED | OUTPATIENT
Start: 2021-01-01 | End: 2021-01-01 | Stop reason: HOSPADM

## 2021-01-01 RX ORDER — DEXTROSE MONOHYDRATE 50 MG/ML
75 INJECTION, SOLUTION INTRAVENOUS CONTINUOUS
Status: DISCONTINUED | OUTPATIENT
Start: 2021-01-01 | End: 2021-01-01

## 2021-01-01 RX ORDER — HEPARIN SODIUM 5000 [USP'U]/ML
5000 INJECTION, SOLUTION INTRAVENOUS; SUBCUTANEOUS EVERY 12 HOURS
Status: DISPENSED | OUTPATIENT
Start: 2021-01-01 | End: 2021-01-01

## 2021-01-01 RX ORDER — FENTANYL CITRATE 50 UG/ML
25-50 INJECTION, SOLUTION INTRAMUSCULAR; INTRAVENOUS ONCE
Status: COMPLETED | OUTPATIENT
Start: 2021-01-01 | End: 2021-01-01

## 2021-01-01 RX ORDER — DOBUTAMINE HYDROCHLORIDE 200 MG/100ML
0-10 INJECTION INTRAVENOUS
Status: DISPENSED | OUTPATIENT
Start: 2021-01-01 | End: 2021-01-01

## 2021-01-01 RX ORDER — BUSPIRONE HYDROCHLORIDE 10 MG/1
10 TABLET ORAL 2 TIMES DAILY
Status: DISCONTINUED | OUTPATIENT
Start: 2021-01-01 | End: 2021-01-01 | Stop reason: HOSPADM

## 2021-01-01 RX ADMIN — BUMETANIDE 2 MG: 0.25 INJECTION, SOLUTION INTRAMUSCULAR; INTRAVENOUS at 17:10

## 2021-01-01 RX ADMIN — Medication 10 ML: at 18:41

## 2021-01-01 RX ADMIN — EPINEPHRINE 14 MCG/MIN: 1 INJECTION INTRAMUSCULAR; INTRAVENOUS; SUBCUTANEOUS at 21:21

## 2021-01-01 RX ADMIN — Medication 10 ML: at 21:25

## 2021-01-01 RX ADMIN — DIPHENHYDRAMINE HYDROCHLORIDE 5 ML: 25 LIQUID ORAL at 16:02

## 2021-01-01 RX ADMIN — FAMOTIDINE 20 MG: 10 INJECTION INTRAVENOUS at 11:16

## 2021-01-01 RX ADMIN — Medication 10 ML: at 13:43

## 2021-01-01 RX ADMIN — Medication 10 ML: at 21:21

## 2021-01-01 RX ADMIN — POTASSIUM CHLORIDE 20 MEQ: 400 INJECTION, SOLUTION INTRAVENOUS at 08:37

## 2021-01-01 RX ADMIN — SUCCINYLCHOLINE CHLORIDE 140 MG: 20 INJECTION, SOLUTION INTRAMUSCULAR; INTRAVENOUS at 20:52

## 2021-01-01 RX ADMIN — EPINEPHRINE 12 MCG/MIN: 1 INJECTION INTRAMUSCULAR; INTRAVENOUS; SUBCUTANEOUS at 07:26

## 2021-01-01 RX ADMIN — BUSPIRONE HYDROCHLORIDE 10 MG: 10 TABLET ORAL at 08:53

## 2021-01-01 RX ADMIN — FENTANYL CITRATE 25 MCG: 50 INJECTION, SOLUTION INTRAMUSCULAR; INTRAVENOUS at 00:06

## 2021-01-01 RX ADMIN — PANTOPRAZOLE SODIUM 40 MG: 40 TABLET, DELAYED RELEASE ORAL at 06:55

## 2021-01-01 RX ADMIN — Medication 10 ML: at 14:27

## 2021-01-01 RX ADMIN — BUMETANIDE 1 MG: 1 TABLET ORAL at 17:55

## 2021-01-01 RX ADMIN — PROPOFOL 20 MCG/KG/MIN: 10 INJECTION, EMULSION INTRAVENOUS at 03:55

## 2021-01-01 RX ADMIN — SODIUM CHLORIDE, POTASSIUM CHLORIDE, SODIUM LACTATE AND CALCIUM CHLORIDE 1000 ML: 600; 310; 30; 20 INJECTION, SOLUTION INTRAVENOUS at 19:00

## 2021-01-01 RX ADMIN — Medication 10 ML: at 15:53

## 2021-01-01 RX ADMIN — Medication 10 ML: at 21:02

## 2021-01-01 RX ADMIN — BUSPIRONE HYDROCHLORIDE 10 MG: 10 TABLET ORAL at 09:24

## 2021-01-01 RX ADMIN — APIXABAN 5 MG: 5 TABLET, FILM COATED ORAL at 21:20

## 2021-01-01 RX ADMIN — OXYCODONE 10 MG: 5 TABLET ORAL at 04:03

## 2021-01-01 RX ADMIN — FENTANYL CITRATE 25 MCG: 50 INJECTION, SOLUTION INTRAMUSCULAR; INTRAVENOUS at 21:06

## 2021-01-01 RX ADMIN — CASTOR OIL AND BALSAM, PERU: 788; 87 OINTMENT TOPICAL at 08:01

## 2021-01-01 RX ADMIN — FAMOTIDINE 20 MG: 10 INJECTION INTRAVENOUS at 12:00

## 2021-01-01 RX ADMIN — FENTANYL CITRATE 25 MCG: 50 INJECTION, SOLUTION INTRAMUSCULAR; INTRAVENOUS at 08:58

## 2021-01-01 RX ADMIN — ASPIRIN 81 MG: 81 TABLET, CHEWABLE ORAL at 08:13

## 2021-01-01 RX ADMIN — DOBUTAMINE IN DEXTROSE 9 MCG/KG/MIN: 200 INJECTION, SOLUTION INTRAVENOUS at 09:45

## 2021-01-01 RX ADMIN — APIXABAN 5 MG: 5 TABLET, FILM COATED ORAL at 21:26

## 2021-01-01 RX ADMIN — Medication 40 ML: at 13:41

## 2021-01-01 RX ADMIN — OXYCODONE 10 MG: 5 TABLET ORAL at 21:15

## 2021-01-01 RX ADMIN — METOLAZONE 2.5 MG: 2.5 TABLET ORAL at 12:51

## 2021-01-01 RX ADMIN — BUMETANIDE 2 MG: 0.25 INJECTION, SOLUTION INTRAMUSCULAR; INTRAVENOUS at 08:53

## 2021-01-01 RX ADMIN — ROCURONIUM BROMIDE 50 MG: 10 INJECTION INTRAVENOUS at 22:05

## 2021-01-01 RX ADMIN — PANTOPRAZOLE SODIUM 40 MG: 40 TABLET, DELAYED RELEASE ORAL at 16:13

## 2021-01-01 RX ADMIN — Medication 1 AMPULE: at 08:21

## 2021-01-01 RX ADMIN — EPINEPHRINE 15 MCG/MIN: 1 INJECTION INTRAMUSCULAR; INTRAVENOUS; SUBCUTANEOUS at 20:46

## 2021-01-01 RX ADMIN — INSULIN GLARGINE 10 UNITS: 100 INJECTION, SOLUTION SUBCUTANEOUS at 23:38

## 2021-01-01 RX ADMIN — APIXABAN 5 MG: 5 TABLET, FILM COATED ORAL at 20:53

## 2021-01-01 RX ADMIN — INSULIN LISPRO 7 UNITS: 100 INJECTION, SOLUTION INTRAVENOUS; SUBCUTANEOUS at 18:19

## 2021-01-01 RX ADMIN — TICAGRELOR 90 MG: 90 TABLET ORAL at 21:06

## 2021-01-01 RX ADMIN — FAMOTIDINE 20 MG: 10 INJECTION INTRAVENOUS at 11:40

## 2021-01-01 RX ADMIN — OXYCODONE 10 MG: 5 TABLET ORAL at 06:33

## 2021-01-01 RX ADMIN — BUMETANIDE 2 MG: 0.25 INJECTION, SOLUTION INTRAMUSCULAR; INTRAVENOUS at 08:54

## 2021-01-01 RX ADMIN — SODIUM CHLORIDE 50 ML/HR: 9 INJECTION, SOLUTION INTRAVENOUS at 15:40

## 2021-01-01 RX ADMIN — FAMOTIDINE 20 MG: 20 TABLET ORAL at 17:55

## 2021-01-01 RX ADMIN — CARVEDILOL 12.5 MG: 12.5 TABLET, FILM COATED ORAL at 18:14

## 2021-01-01 RX ADMIN — CASTOR OIL AND BALSAM, PERU: 788; 87 OINTMENT TOPICAL at 10:00

## 2021-01-01 RX ADMIN — Medication 75 MCG/HR: at 07:29

## 2021-01-01 RX ADMIN — EPINEPHRINE 13 MCG/MIN: 1 INJECTION INTRAMUSCULAR; INTRAVENOUS; SUBCUTANEOUS at 11:02

## 2021-01-01 RX ADMIN — Medication 10 ML: at 23:52

## 2021-01-01 RX ADMIN — INSULIN GLARGINE 10 UNITS: 100 INJECTION, SOLUTION SUBCUTANEOUS at 22:05

## 2021-01-01 RX ADMIN — CASTOR OIL AND BALSAM, PERU: 788; 87 OINTMENT TOPICAL at 22:47

## 2021-01-01 RX ADMIN — Medication 1 AMPULE: at 20:12

## 2021-01-01 RX ADMIN — EPINEPHRINE 12 MCG/MIN: 1 INJECTION INTRAMUSCULAR; INTRAVENOUS; SUBCUTANEOUS at 19:23

## 2021-01-01 RX ADMIN — PROPOFOL 35 MCG/KG/MIN: 10 INJECTION, EMULSION INTRAVENOUS at 12:11

## 2021-01-01 RX ADMIN — LIDOCAINE HYDROCHLORIDE 40 MG: 20 INJECTION, SOLUTION EPIDURAL; INFILTRATION; INTRACAUDAL; PERINEURAL at 20:52

## 2021-01-01 RX ADMIN — DEXTROSE MONOHYDRATE AND SODIUM CHLORIDE 50 ML/HR: 5; .9 INJECTION, SOLUTION INTRAVENOUS at 12:07

## 2021-01-01 RX ADMIN — METOLAZONE 2.5 MG: 2.5 TABLET ORAL at 08:54

## 2021-01-01 RX ADMIN — PANTOPRAZOLE SODIUM 40 MG: 40 TABLET, DELAYED RELEASE ORAL at 09:06

## 2021-01-01 RX ADMIN — HYDRALAZINE HYDROCHLORIDE 100 MG: 50 TABLET, FILM COATED ORAL at 08:25

## 2021-01-01 RX ADMIN — INSULIN LISPRO 2 UNITS: 100 INJECTION, SOLUTION INTRAVENOUS; SUBCUTANEOUS at 17:05

## 2021-01-01 RX ADMIN — PANTOPRAZOLE SODIUM 40 MG: 40 TABLET, DELAYED RELEASE ORAL at 17:27

## 2021-01-01 RX ADMIN — INSULIN LISPRO 7 UNITS: 100 INJECTION, SOLUTION INTRAVENOUS; SUBCUTANEOUS at 11:38

## 2021-01-01 RX ADMIN — SODIUM CHLORIDE, POTASSIUM CHLORIDE, SODIUM LACTATE AND CALCIUM CHLORIDE 1000 ML: 600; 310; 30; 20 INJECTION, SOLUTION INTRAVENOUS at 06:45

## 2021-01-01 RX ADMIN — DOBUTAMINE IN DEXTROSE 9 MCG/KG/MIN: 200 INJECTION, SOLUTION INTRAVENOUS at 20:11

## 2021-01-01 RX ADMIN — BUMETANIDE 1 MG: 1 TABLET ORAL at 18:21

## 2021-01-01 RX ADMIN — HEPARIN SODIUM AND DEXTROSE 17 UNITS/KG/HR: 10000; 5 INJECTION INTRAVENOUS at 04:11

## 2021-01-01 RX ADMIN — CARVEDILOL 12.5 MG: 12.5 TABLET, FILM COATED ORAL at 09:06

## 2021-01-01 RX ADMIN — ATORVASTATIN CALCIUM 10 MG: 20 TABLET, FILM COATED ORAL at 08:01

## 2021-01-01 RX ADMIN — DOBUTAMINE IN DEXTROSE 7 MCG/KG/MIN: 200 INJECTION, SOLUTION INTRAVENOUS at 16:05

## 2021-01-01 RX ADMIN — DOBUTAMINE IN DEXTROSE 10 MCG/KG/MIN: 200 INJECTION, SOLUTION INTRAVENOUS at 02:24

## 2021-01-01 RX ADMIN — IPRATROPIUM BROMIDE AND ALBUTEROL SULFATE 3 ML: .5; 3 SOLUTION RESPIRATORY (INHALATION) at 22:38

## 2021-01-01 RX ADMIN — Medication 10 ML: at 06:30

## 2021-01-01 RX ADMIN — FENTANYL CITRATE 25 MCG: 50 INJECTION, SOLUTION INTRAMUSCULAR; INTRAVENOUS at 19:43

## 2021-01-01 RX ADMIN — PROPOFOL 20 MCG/KG/MIN: 10 INJECTION, EMULSION INTRAVENOUS at 23:18

## 2021-01-01 RX ADMIN — PANTOPRAZOLE SODIUM 40 MG: 40 TABLET, DELAYED RELEASE ORAL at 16:35

## 2021-01-01 RX ADMIN — EPINEPHRINE 15 MCG/MIN: 1 INJECTION INTRAMUSCULAR; INTRAVENOUS; SUBCUTANEOUS at 08:25

## 2021-01-01 RX ADMIN — CARVEDILOL 12.5 MG: 12.5 TABLET, FILM COATED ORAL at 08:34

## 2021-01-01 RX ADMIN — SERTRALINE HYDROCHLORIDE 125 MG: 25 TABLET ORAL at 08:11

## 2021-01-01 RX ADMIN — Medication 40 ML: at 14:17

## 2021-01-01 RX ADMIN — PROPOFOL 20 MCG/KG/MIN: 10 INJECTION, EMULSION INTRAVENOUS at 08:41

## 2021-01-01 RX ADMIN — TICAGRELOR 90 MG: 90 TABLET ORAL at 19:01

## 2021-01-01 RX ADMIN — FAMOTIDINE 20 MG: 20 TABLET ORAL at 06:10

## 2021-01-01 RX ADMIN — Medication 10 ML: at 09:36

## 2021-01-01 RX ADMIN — CHLORHEXIDINE GLUCONATE 15 ML: 0.12 RINSE ORAL at 21:23

## 2021-01-01 RX ADMIN — SODIUM BICARBONATE 650 MG: 650 TABLET ORAL at 08:48

## 2021-01-01 RX ADMIN — CARVEDILOL 12.5 MG: 12.5 TABLET, FILM COATED ORAL at 08:23

## 2021-01-01 RX ADMIN — PROPOFOL 40 MCG/KG/MIN: 10 INJECTION, EMULSION INTRAVENOUS at 08:57

## 2021-01-01 RX ADMIN — Medication 10 ML: at 06:18

## 2021-01-01 RX ADMIN — ASPIRIN 81 MG: 81 TABLET, COATED ORAL at 09:02

## 2021-01-01 RX ADMIN — TICAGRELOR 90 MG: 90 TABLET ORAL at 18:21

## 2021-01-01 RX ADMIN — DOBUTAMINE IN DEXTROSE 7 MCG/KG/MIN: 200 INJECTION, SOLUTION INTRAVENOUS at 17:36

## 2021-01-01 RX ADMIN — BUMETANIDE 2 MG: 0.25 INJECTION, SOLUTION INTRAMUSCULAR; INTRAVENOUS at 17:23

## 2021-01-01 RX ADMIN — CARVEDILOL 12.5 MG: 12.5 TABLET, FILM COATED ORAL at 17:05

## 2021-01-01 RX ADMIN — BUMETANIDE 1 MG/HR: 0.25 INJECTION, SOLUTION INTRAMUSCULAR; INTRAVENOUS at 17:34

## 2021-01-01 RX ADMIN — PROPOFOL 40 MCG/KG/MIN: 10 INJECTION, EMULSION INTRAVENOUS at 02:23

## 2021-01-01 RX ADMIN — SODIUM BICARBONATE 50 MEQ: 84 INJECTION, SOLUTION INTRAVENOUS at 08:37

## 2021-01-01 RX ADMIN — SERTRALINE HYDROCHLORIDE 125 MG: 25 TABLET ORAL at 09:05

## 2021-01-01 RX ADMIN — PROPOFOL 20 MCG/KG/MIN: 10 INJECTION, EMULSION INTRAVENOUS at 14:16

## 2021-01-01 RX ADMIN — Medication 10 ML: at 05:17

## 2021-01-01 RX ADMIN — Medication 10 ML: at 21:44

## 2021-01-01 RX ADMIN — DOBUTAMINE IN DEXTROSE 9 MCG/KG/MIN: 200 INJECTION, SOLUTION INTRAVENOUS at 02:30

## 2021-01-01 RX ADMIN — CHLORHEXIDINE GLUCONATE 15 ML: 0.12 RINSE ORAL at 08:58

## 2021-01-01 RX ADMIN — POTASSIUM CHLORIDE 20 MEQ: 400 INJECTION, SOLUTION INTRAVENOUS at 10:26

## 2021-01-01 RX ADMIN — OXYCODONE 10 MG: 5 TABLET ORAL at 20:28

## 2021-01-01 RX ADMIN — CARVEDILOL 12.5 MG: 12.5 TABLET, FILM COATED ORAL at 17:55

## 2021-01-01 RX ADMIN — FAMOTIDINE 20 MG: 10 INJECTION INTRAVENOUS at 12:08

## 2021-01-01 RX ADMIN — TICAGRELOR 90 MG: 90 TABLET ORAL at 08:20

## 2021-01-01 RX ADMIN — PROPOFOL 25 MCG/KG/MIN: 10 INJECTION, EMULSION INTRAVENOUS at 13:50

## 2021-01-01 RX ADMIN — CARVEDILOL 12.5 MG: 12.5 TABLET, FILM COATED ORAL at 17:27

## 2021-01-01 RX ADMIN — Medication 1 AMPULE: at 08:33

## 2021-01-01 RX ADMIN — Medication 10 ML: at 22:03

## 2021-01-01 RX ADMIN — PANTOPRAZOLE SODIUM 40 MG: 40 TABLET, DELAYED RELEASE ORAL at 17:36

## 2021-01-01 RX ADMIN — MEROPENEM 500 MG: 500 INJECTION, POWDER, FOR SOLUTION INTRAVENOUS at 11:00

## 2021-01-01 RX ADMIN — ATORVASTATIN CALCIUM 10 MG: 20 TABLET, FILM COATED ORAL at 08:23

## 2021-01-01 RX ADMIN — Medication 10 ML: at 05:36

## 2021-01-01 RX ADMIN — DOBUTAMINE IN DEXTROSE 9 MCG/KG/MIN: 200 INJECTION, SOLUTION INTRAVENOUS at 15:50

## 2021-01-01 RX ADMIN — PROPOFOL 40 MCG/KG/MIN: 10 INJECTION, EMULSION INTRAVENOUS at 22:02

## 2021-01-01 RX ADMIN — BUMETANIDE 2 MG: 0.25 INJECTION, SOLUTION INTRAMUSCULAR; INTRAVENOUS at 09:22

## 2021-01-01 RX ADMIN — FENTANYL CITRATE 25 MCG: 50 INJECTION, SOLUTION INTRAMUSCULAR; INTRAVENOUS at 02:58

## 2021-01-01 RX ADMIN — PROPOFOL 40 MCG/KG/MIN: 10 INJECTION, EMULSION INTRAVENOUS at 09:44

## 2021-01-01 RX ADMIN — FENTANYL CITRATE 50 MCG: 50 INJECTION, SOLUTION INTRAMUSCULAR; INTRAVENOUS at 20:59

## 2021-01-01 RX ADMIN — FENTANYL CITRATE 25 MCG: 50 INJECTION, SOLUTION INTRAMUSCULAR; INTRAVENOUS at 16:04

## 2021-01-01 RX ADMIN — DOBUTAMINE HYDROCHLORIDE 7 MCG/KG/MIN: 400 INJECTION INTRAVENOUS at 17:28

## 2021-01-01 RX ADMIN — ACETAMINOPHEN 650 MG: 325 TABLET ORAL at 19:01

## 2021-01-01 RX ADMIN — MILRINONE LACTATE 0.25 MCG/KG/MIN: 200 INJECTION, SOLUTION INTRAVENOUS at 20:48

## 2021-01-01 RX ADMIN — CLOPIDOGREL BISULFATE 75 MG: 75 TABLET ORAL at 14:14

## 2021-01-01 RX ADMIN — Medication 10 ML: at 05:46

## 2021-01-01 RX ADMIN — INSULIN GLARGINE 15 UNITS: 100 INJECTION, SOLUTION SUBCUTANEOUS at 08:08

## 2021-01-01 RX ADMIN — APIXABAN 5 MG: 5 TABLET, FILM COATED ORAL at 08:34

## 2021-01-01 RX ADMIN — INSULIN LISPRO 5 UNITS: 100 INJECTION, SOLUTION INTRAVENOUS; SUBCUTANEOUS at 16:12

## 2021-01-01 RX ADMIN — BUSPIRONE HYDROCHLORIDE 10 MG: 10 TABLET ORAL at 08:34

## 2021-01-01 RX ADMIN — Medication 10 ML: at 06:01

## 2021-01-01 RX ADMIN — PROPOFOL 35 MCG/KG/MIN: 10 INJECTION, EMULSION INTRAVENOUS at 19:57

## 2021-01-01 RX ADMIN — Medication 1 AMPULE: at 23:39

## 2021-01-01 RX ADMIN — VANCOMYCIN HYDROCHLORIDE 2000 MG: 10 INJECTION, POWDER, LYOPHILIZED, FOR SOLUTION INTRAVENOUS at 12:53

## 2021-01-01 RX ADMIN — MEROPENEM 500 MG: 500 INJECTION, POWDER, FOR SOLUTION INTRAVENOUS at 19:16

## 2021-01-01 RX ADMIN — SODIUM CHLORIDE 200 MCG/MIN: 900 INJECTION, SOLUTION INTRAVENOUS at 21:06

## 2021-01-01 RX ADMIN — Medication 10 ML: at 05:43

## 2021-01-01 RX ADMIN — FENTANYL CITRATE 25 MCG: 50 INJECTION, SOLUTION INTRAMUSCULAR; INTRAVENOUS at 17:01

## 2021-01-01 RX ADMIN — Medication 10 ML: at 05:08

## 2021-01-01 RX ADMIN — FENTANYL CITRATE 25 MCG: 50 INJECTION, SOLUTION INTRAMUSCULAR; INTRAVENOUS at 12:01

## 2021-01-01 RX ADMIN — ALBUMIN (HUMAN) 25 G: 12.5 INJECTION, SOLUTION INTRAVENOUS at 09:39

## 2021-01-01 RX ADMIN — WARFARIN SODIUM 4 MG: 2 TABLET ORAL at 18:00

## 2021-01-01 RX ADMIN — PROPOFOL 45 MCG/KG/MIN: 10 INJECTION, EMULSION INTRAVENOUS at 02:00

## 2021-01-01 RX ADMIN — BUMETANIDE 2 MG: 0.25 INJECTION, SOLUTION INTRAMUSCULAR; INTRAVENOUS at 13:44

## 2021-01-01 RX ADMIN — NITROGLYCERIN 0.4 MG: 0.4 TABLET, ORALLY DISINTEGRATING SUBLINGUAL at 07:13

## 2021-01-01 RX ADMIN — CLOPIDOGREL BISULFATE 75 MG: 75 TABLET ORAL at 08:20

## 2021-01-01 RX ADMIN — BUMETANIDE 1 MG/HR: 0.25 INJECTION, SOLUTION INTRAMUSCULAR; INTRAVENOUS at 05:51

## 2021-01-01 RX ADMIN — Medication 1 AMPULE: at 21:02

## 2021-01-01 RX ADMIN — Medication 10 ML: at 05:42

## 2021-01-01 RX ADMIN — PANTOPRAZOLE SODIUM 40 MG: 40 TABLET, DELAYED RELEASE ORAL at 08:53

## 2021-01-01 RX ADMIN — CHLOROTHIAZIDE SODIUM 250 MG: 500 INJECTION, POWDER, LYOPHILIZED, FOR SOLUTION INTRAVENOUS at 21:01

## 2021-01-01 RX ADMIN — Medication 200 MCG: at 20:52

## 2021-01-01 RX ADMIN — Medication 10 ML: at 21:24

## 2021-01-01 RX ADMIN — SODIUM CHLORIDE 0.25 MCG/KG/MIN: 9 INJECTION, SOLUTION INTRAVENOUS at 17:36

## 2021-01-01 RX ADMIN — FENTANYL CITRATE 25 MCG: 50 INJECTION, SOLUTION INTRAMUSCULAR; INTRAVENOUS at 02:04

## 2021-01-01 RX ADMIN — Medication 10 ML: at 05:54

## 2021-01-01 RX ADMIN — FENTANYL CITRATE 25 MCG: 50 INJECTION, SOLUTION INTRAMUSCULAR; INTRAVENOUS at 23:30

## 2021-01-01 RX ADMIN — FUROSEMIDE 40 MG: 10 INJECTION, SOLUTION INTRAMUSCULAR; INTRAVENOUS at 11:13

## 2021-01-01 RX ADMIN — Medication 10 ML: at 14:22

## 2021-01-01 RX ADMIN — Medication 10 ML: at 06:50

## 2021-01-01 RX ADMIN — CHLOROTHIAZIDE SODIUM 250 MG: 500 INJECTION, POWDER, LYOPHILIZED, FOR SOLUTION INTRAVENOUS at 23:59

## 2021-01-01 RX ADMIN — BUMETANIDE 2 MG: 0.25 INJECTION, SOLUTION INTRAMUSCULAR; INTRAVENOUS at 18:26

## 2021-01-01 RX ADMIN — PROPOFOL 40 MCG/KG/MIN: 10 INJECTION, EMULSION INTRAVENOUS at 08:42

## 2021-01-01 RX ADMIN — Medication 10 ML: at 08:21

## 2021-01-01 RX ADMIN — ASPIRIN 81 MG: 81 TABLET, COATED ORAL at 08:20

## 2021-01-01 RX ADMIN — INSULIN GLARGINE 15 UNITS: 100 INJECTION, SOLUTION SUBCUTANEOUS at 11:39

## 2021-01-01 RX ADMIN — Medication 1 AMPULE: at 21:01

## 2021-01-01 RX ADMIN — CASTOR OIL AND BALSAM, PERU: 788; 87 OINTMENT TOPICAL at 17:57

## 2021-01-01 RX ADMIN — PROPOFOL 40 MCG/KG/MIN: 10 INJECTION, EMULSION INTRAVENOUS at 18:50

## 2021-01-01 RX ADMIN — ETOMIDATE 12 MG: 2 INJECTION, SOLUTION INTRAVENOUS at 20:52

## 2021-01-01 RX ADMIN — CASTOR OIL AND BALSAM, PERU: 788; 87 OINTMENT TOPICAL at 08:27

## 2021-01-01 RX ADMIN — CARVEDILOL 12.5 MG: 12.5 TABLET, FILM COATED ORAL at 18:00

## 2021-01-01 RX ADMIN — DOBUTAMINE IN DEXTROSE 10 MCG/KG/MIN: 200 INJECTION, SOLUTION INTRAVENOUS at 00:30

## 2021-01-01 RX ADMIN — PANTOPRAZOLE SODIUM 40 MG: 40 TABLET, DELAYED RELEASE ORAL at 08:34

## 2021-01-01 RX ADMIN — FENTANYL CITRATE 25 MCG: 50 INJECTION, SOLUTION INTRAMUSCULAR; INTRAVENOUS at 11:53

## 2021-01-01 RX ADMIN — MEROPENEM 500 MG: 500 INJECTION, POWDER, FOR SOLUTION INTRAVENOUS at 18:33

## 2021-01-01 RX ADMIN — DOBUTAMINE IN DEXTROSE 10 MCG/KG/MIN: 200 INJECTION, SOLUTION INTRAVENOUS at 01:09

## 2021-01-01 RX ADMIN — SODIUM CHLORIDE 0.12 MCG/KG/MIN: 9 INJECTION, SOLUTION INTRAVENOUS at 18:11

## 2021-01-01 RX ADMIN — CARVEDILOL 3.12 MG: 3.12 TABLET, FILM COATED ORAL at 09:02

## 2021-01-01 RX ADMIN — DOBUTAMINE IN DEXTROSE 7 MCG/KG/MIN: 200 INJECTION, SOLUTION INTRAVENOUS at 11:50

## 2021-01-01 RX ADMIN — NITROGLYCERIN 0.4 MG: 0.4 TABLET, ORALLY DISINTEGRATING SUBLINGUAL at 22:48

## 2021-01-01 RX ADMIN — BUMETANIDE 1 MG: 1 TABLET ORAL at 17:05

## 2021-01-01 RX ADMIN — ASPIRIN 81 MG: 81 TABLET, CHEWABLE ORAL at 09:04

## 2021-01-01 RX ADMIN — FAMOTIDINE 20 MG: 10 INJECTION INTRAVENOUS at 11:12

## 2021-01-01 RX ADMIN — CASTOR OIL AND BALSAM, PERU: 788; 87 OINTMENT TOPICAL at 08:05

## 2021-01-01 RX ADMIN — TICAGRELOR 90 MG: 90 TABLET ORAL at 20:12

## 2021-01-01 RX ADMIN — PROPOFOL 0 MCG/KG/MIN: 10 INJECTION, EMULSION INTRAVENOUS at 06:35

## 2021-01-01 RX ADMIN — NOREPINEPHRINE BITARTRATE 36 MCG/MIN: 1 INJECTION, SOLUTION, CONCENTRATE INTRAVENOUS at 19:22

## 2021-01-01 RX ADMIN — Medication 40 ML: at 14:00

## 2021-01-01 RX ADMIN — BUSPIRONE HYDROCHLORIDE 10 MG: 10 TABLET ORAL at 17:27

## 2021-01-01 RX ADMIN — MAGNESIUM SULFATE HEPTAHYDRATE 2 G: 40 INJECTION, SOLUTION INTRAVENOUS at 08:17

## 2021-01-01 RX ADMIN — Medication 1 AMPULE: at 08:49

## 2021-01-01 RX ADMIN — CARVEDILOL 12.5 MG: 12.5 TABLET, FILM COATED ORAL at 08:53

## 2021-01-01 RX ADMIN — DOBUTAMINE 7 MCG/KG/MIN: 12.5 INJECTION, SOLUTION, CONCENTRATE INTRAVENOUS at 23:00

## 2021-01-01 RX ADMIN — SODIUM CHLORIDE 250 ML: 9 INJECTION, SOLUTION INTRAVENOUS at 12:17

## 2021-01-01 RX ADMIN — Medication 10 ML: at 06:04

## 2021-01-01 RX ADMIN — BUMETANIDE 2 MG: 0.25 INJECTION, SOLUTION INTRAMUSCULAR; INTRAVENOUS at 10:12

## 2021-01-01 RX ADMIN — HEPARIN SODIUM 5000 UNITS: 5000 INJECTION INTRAVENOUS; SUBCUTANEOUS at 06:49

## 2021-01-01 RX ADMIN — NITROGLYCERIN 1 INCH: 20 OINTMENT TOPICAL at 06:00

## 2021-01-01 RX ADMIN — PANTOPRAZOLE SODIUM 40 MG: 40 TABLET, DELAYED RELEASE ORAL at 17:55

## 2021-01-01 RX ADMIN — CHLOROTHIAZIDE SODIUM 250 MG: 500 INJECTION, POWDER, LYOPHILIZED, FOR SOLUTION INTRAVENOUS at 09:55

## 2021-01-01 RX ADMIN — PANTOPRAZOLE SODIUM 40 MG: 40 TABLET, DELAYED RELEASE ORAL at 08:33

## 2021-01-01 RX ADMIN — Medication 1 AMPULE: at 20:50

## 2021-01-01 RX ADMIN — CHLORHEXIDINE GLUCONATE 15 ML: 0.12 RINSE ORAL at 08:15

## 2021-01-01 RX ADMIN — DOBUTAMINE IN DEXTROSE 10 MCG/KG/MIN: 200 INJECTION, SOLUTION INTRAVENOUS at 09:45

## 2021-01-01 RX ADMIN — PROPOFOL 40 MCG/KG/MIN: 10 INJECTION, EMULSION INTRAVENOUS at 23:01

## 2021-01-01 RX ADMIN — PREDNISONE 40 MG: 20 TABLET ORAL at 13:24

## 2021-01-01 RX ADMIN — MEROPENEM 500 MG: 500 INJECTION, POWDER, FOR SOLUTION INTRAVENOUS at 05:07

## 2021-01-01 RX ADMIN — PANTOPRAZOLE SODIUM 40 MG: 40 TABLET, DELAYED RELEASE ORAL at 17:23

## 2021-01-01 RX ADMIN — CLOPIDOGREL BISULFATE 75 MG: 75 TABLET ORAL at 11:01

## 2021-01-01 RX ADMIN — Medication 1 AMPULE: at 08:02

## 2021-01-01 RX ADMIN — BUMETANIDE 1 MG: 1 TABLET ORAL at 09:02

## 2021-01-01 RX ADMIN — APIXABAN 5 MG: 5 TABLET, FILM COATED ORAL at 20:29

## 2021-01-01 RX ADMIN — CHLORHEXIDINE GLUCONATE 15 ML: 0.12 RINSE ORAL at 21:03

## 2021-01-01 RX ADMIN — CHLORHEXIDINE GLUCONATE 15 ML: 0.12 RINSE ORAL at 08:44

## 2021-01-01 RX ADMIN — Medication 40 ML: at 11:13

## 2021-01-01 RX ADMIN — Medication 10 ML: at 22:20

## 2021-01-01 RX ADMIN — CHLORHEXIDINE GLUCONATE 15 ML: 0.12 RINSE ORAL at 20:52

## 2021-01-01 RX ADMIN — DOBUTAMINE IN DEXTROSE 10 MCG/KG/MIN: 200 INJECTION, SOLUTION INTRAVENOUS at 00:25

## 2021-01-01 RX ADMIN — FENTANYL CITRATE 50 MCG: 50 INJECTION, SOLUTION INTRAMUSCULAR; INTRAVENOUS at 23:37

## 2021-01-01 RX ADMIN — FENTANYL CITRATE 25 MCG: 50 INJECTION, SOLUTION INTRAMUSCULAR; INTRAVENOUS at 23:14

## 2021-01-01 RX ADMIN — Medication 10 ML: at 05:32

## 2021-01-01 RX ADMIN — CARVEDILOL 3.12 MG: 3.12 TABLET, FILM COATED ORAL at 18:20

## 2021-01-01 RX ADMIN — PROPOFOL 40 MCG/KG/MIN: 10 INJECTION, EMULSION INTRAVENOUS at 06:03

## 2021-01-01 RX ADMIN — Medication 10 ML: at 17:57

## 2021-01-01 RX ADMIN — OXYCODONE 5 MG: 5 TABLET ORAL at 22:21

## 2021-01-01 RX ADMIN — CLOPIDOGREL BISULFATE 75 MG: 75 TABLET ORAL at 09:03

## 2021-01-01 RX ADMIN — NOREPINEPHRINE BITARTRATE 50 MCG/MIN: 1 INJECTION, SOLUTION, CONCENTRATE INTRAVENOUS at 08:24

## 2021-01-01 RX ADMIN — BUSPIRONE HYDROCHLORIDE 10 MG: 10 TABLET ORAL at 17:55

## 2021-01-01 RX ADMIN — DIPHENHYDRAMINE HYDROCHLORIDE 25 MG: 25 CAPSULE ORAL at 16:05

## 2021-01-01 RX ADMIN — Medication 10 ML: at 21:12

## 2021-01-01 RX ADMIN — TICAGRELOR 90 MG: 90 TABLET ORAL at 08:23

## 2021-01-01 RX ADMIN — Medication 50 MCG/HR: at 11:10

## 2021-01-01 RX ADMIN — MEROPENEM 500 MG: 500 INJECTION, POWDER, FOR SOLUTION INTRAVENOUS at 05:23

## 2021-01-01 RX ADMIN — PANTOPRAZOLE SODIUM 40 MG: 40 TABLET, DELAYED RELEASE ORAL at 17:31

## 2021-01-01 RX ADMIN — NOREPINEPHRINE BITARTRATE 65 MCG/MIN: 1 INJECTION, SOLUTION, CONCENTRATE INTRAVENOUS at 17:42

## 2021-01-01 RX ADMIN — Medication 10 ML: at 13:33

## 2021-01-01 RX ADMIN — BUMETANIDE 1 MG: 1 TABLET ORAL at 09:23

## 2021-01-01 RX ADMIN — BUSPIRONE HYDROCHLORIDE 10 MG: 10 TABLET ORAL at 09:06

## 2021-01-01 RX ADMIN — BUMETANIDE 1 MG/HR: 0.25 INJECTION, SOLUTION INTRAMUSCULAR; INTRAVENOUS at 18:51

## 2021-01-01 RX ADMIN — IPRATROPIUM BROMIDE AND ALBUTEROL SULFATE 3 ML: .5; 3 SOLUTION RESPIRATORY (INHALATION) at 11:17

## 2021-01-01 RX ADMIN — SODIUM CHLORIDE 0.25 MCG/KG/MIN: 9 INJECTION, SOLUTION INTRAVENOUS at 05:16

## 2021-01-01 RX ADMIN — PROPOFOL 35 MCG/KG/MIN: 10 INJECTION, EMULSION INTRAVENOUS at 06:39

## 2021-01-01 RX ADMIN — Medication 1 AMPULE: at 09:00

## 2021-01-01 RX ADMIN — CARVEDILOL 12.5 MG: 12.5 TABLET, FILM COATED ORAL at 17:31

## 2021-01-01 RX ADMIN — BUSPIRONE HYDROCHLORIDE 10 MG: 10 TABLET ORAL at 11:27

## 2021-01-01 RX ADMIN — INSULIN LISPRO 3 UNITS: 100 INJECTION, SOLUTION INTRAVENOUS; SUBCUTANEOUS at 21:32

## 2021-01-01 RX ADMIN — DEXTROSE MONOHYDRATE 12.5 G: 500 INJECTION PARENTERAL at 11:54

## 2021-01-01 RX ADMIN — HEPARIN SODIUM AND DEXTROSE 18 UNITS/KG/HR: 10000; 5 INJECTION INTRAVENOUS at 03:22

## 2021-01-01 RX ADMIN — PREDNISONE 40 MG: 20 TABLET ORAL at 16:04

## 2021-01-01 RX ADMIN — CHLORHEXIDINE GLUCONATE 15 ML: 0.12 RINSE ORAL at 20:12

## 2021-01-01 RX ADMIN — BUSPIRONE HYDROCHLORIDE 10 MG: 10 TABLET ORAL at 17:32

## 2021-01-01 RX ADMIN — DOBUTAMINE IN DEXTROSE 10 MCG/KG/MIN: 200 INJECTION, SOLUTION INTRAVENOUS at 16:36

## 2021-01-01 RX ADMIN — Medication 10 ML: at 07:13

## 2021-01-01 RX ADMIN — Medication 10 ML: at 03:30

## 2021-01-01 RX ADMIN — VANCOMYCIN HYDROCHLORIDE 2000 MG: 10 INJECTION, POWDER, LYOPHILIZED, FOR SOLUTION INTRAVENOUS at 05:46

## 2021-01-01 RX ADMIN — BUMETANIDE 1 MG/HR: 0.25 INJECTION, SOLUTION INTRAMUSCULAR; INTRAVENOUS at 06:50

## 2021-01-01 RX ADMIN — FENTANYL CITRATE 50 MCG: 50 INJECTION, SOLUTION INTRAMUSCULAR; INTRAVENOUS at 18:32

## 2021-01-01 RX ADMIN — Medication 10 ML: at 22:01

## 2021-01-01 RX ADMIN — PROPOFOL 45 MCG/KG/MIN: 10 INJECTION, EMULSION INTRAVENOUS at 04:58

## 2021-01-01 RX ADMIN — ROCURONIUM BROMIDE 10 MG: 10 INJECTION INTRAVENOUS at 20:52

## 2021-01-01 RX ADMIN — SODIUM CHLORIDE 75 ML/HR: 9 INJECTION, SOLUTION INTRAVENOUS at 23:40

## 2021-01-01 RX ADMIN — MAGNESIUM SULFATE HEPTAHYDRATE 2 G: 40 INJECTION, SOLUTION INTRAVENOUS at 09:23

## 2021-01-01 RX ADMIN — SERTRALINE HYDROCHLORIDE 125 MG: 25 TABLET ORAL at 08:54

## 2021-01-01 RX ADMIN — BUMETANIDE 1 MG: 0.25 INJECTION, SOLUTION INTRAMUSCULAR; INTRAVENOUS at 08:24

## 2021-01-01 RX ADMIN — Medication 1 AMPULE: at 22:15

## 2021-01-01 RX ADMIN — Medication 10 ML: at 13:31

## 2021-01-01 RX ADMIN — FENTANYL CITRATE 25 MCG: 50 INJECTION, SOLUTION INTRAMUSCULAR; INTRAVENOUS at 12:30

## 2021-01-01 RX ADMIN — SERTRALINE HYDROCHLORIDE 125 MG: 25 TABLET ORAL at 09:22

## 2021-01-01 RX ADMIN — CALCIUM CHLORIDE 1 G: 100 INJECTION, SOLUTION INTRAVENOUS at 16:57

## 2021-01-01 RX ADMIN — ATORVASTATIN CALCIUM 10 MG: 20 TABLET, FILM COATED ORAL at 09:03

## 2021-01-01 RX ADMIN — INSULIN LISPRO 3 UNITS: 100 INJECTION, SOLUTION INTRAVENOUS; SUBCUTANEOUS at 00:09

## 2021-01-01 RX ADMIN — NOREPINEPHRINE BITARTRATE 60 MCG/MIN: 1 INJECTION, SOLUTION, CONCENTRATE INTRAVENOUS at 21:21

## 2021-01-01 RX ADMIN — FENTANYL CITRATE 25 MCG: 50 INJECTION, SOLUTION INTRAMUSCULAR; INTRAVENOUS at 00:52

## 2021-01-01 RX ADMIN — Medication 10 ML: at 21:30

## 2021-01-01 RX ADMIN — OXYCODONE 5 MG: 5 TABLET ORAL at 09:05

## 2021-01-01 RX ADMIN — Medication 10 ML: at 21:01

## 2021-01-01 RX ADMIN — APIXABAN 5 MG: 5 TABLET, FILM COATED ORAL at 08:12

## 2021-01-01 RX ADMIN — Medication 10 ML: at 13:12

## 2021-01-01 RX ADMIN — CASTOR OIL AND BALSAM, PERU: 788; 87 OINTMENT TOPICAL at 18:00

## 2021-01-01 RX ADMIN — METOLAZONE 2.5 MG: 2.5 TABLET ORAL at 09:52

## 2021-01-01 RX ADMIN — BUMETANIDE 2 MG: 0.25 INJECTION, SOLUTION INTRAMUSCULAR; INTRAVENOUS at 18:01

## 2021-01-01 RX ADMIN — BUSPIRONE HYDROCHLORIDE 10 MG: 10 TABLET ORAL at 17:36

## 2021-01-01 RX ADMIN — OXYCODONE 10 MG: 5 TABLET ORAL at 03:38

## 2021-01-01 RX ADMIN — CHLORHEXIDINE GLUCONATE 15 ML: 0.12 RINSE ORAL at 09:45

## 2021-01-01 RX ADMIN — POTASSIUM CHLORIDE 20 MEQ: 400 INJECTION, SOLUTION INTRAVENOUS at 06:50

## 2021-01-01 RX ADMIN — CHLOROTHIAZIDE SODIUM 250 MG: 500 INJECTION, POWDER, LYOPHILIZED, FOR SOLUTION INTRAVENOUS at 11:48

## 2021-01-01 RX ADMIN — ACETAMINOPHEN 650 MG: 325 TABLET ORAL at 09:45

## 2021-01-01 RX ADMIN — EPINEPHRINE 12 MCG/MIN: 1 INJECTION INTRAMUSCULAR; INTRAVENOUS; SUBCUTANEOUS at 14:45

## 2021-01-01 RX ADMIN — FAMOTIDINE 20 MG: 10 INJECTION INTRAVENOUS at 12:30

## 2021-01-01 RX ADMIN — Medication 1 AMPULE: at 21:03

## 2021-01-01 RX ADMIN — BUMETANIDE 2 MG: 0.25 INJECTION, SOLUTION INTRAMUSCULAR; INTRAVENOUS at 18:19

## 2021-01-01 RX ADMIN — Medication 10 ML: at 06:06

## 2021-01-01 RX ADMIN — APIXABAN 5 MG: 5 TABLET, FILM COATED ORAL at 08:33

## 2021-01-01 RX ADMIN — INSULIN GLARGINE 10 UNITS: 100 INJECTION, SOLUTION SUBCUTANEOUS at 00:09

## 2021-01-01 RX ADMIN — Medication 1 AMPULE: at 20:13

## 2021-01-01 RX ADMIN — BUMETANIDE 2 MG: 0.25 INJECTION, SOLUTION INTRAMUSCULAR; INTRAVENOUS at 17:28

## 2021-01-01 RX ADMIN — DEXTROSE MONOHYDRATE 12.5 G: 500 INJECTION PARENTERAL at 12:02

## 2021-01-01 RX ADMIN — CHLOROTHIAZIDE SODIUM 250 MG: 500 INJECTION, POWDER, LYOPHILIZED, FOR SOLUTION INTRAVENOUS at 11:35

## 2021-01-01 RX ADMIN — BUMETANIDE 2 MG: 0.25 INJECTION, SOLUTION INTRAMUSCULAR; INTRAVENOUS at 17:36

## 2021-01-01 RX ADMIN — Medication 10 ML: at 15:50

## 2021-01-01 RX ADMIN — PROPOFOL 35 MCG/KG/MIN: 10 INJECTION, EMULSION INTRAVENOUS at 00:27

## 2021-01-01 RX ADMIN — PANTOPRAZOLE SODIUM 40 MG: 40 TABLET, DELAYED RELEASE ORAL at 18:14

## 2021-01-01 RX ADMIN — PROPOFOL 35 MCG/KG/MIN: 10 INJECTION, EMULSION INTRAVENOUS at 02:45

## 2021-01-01 RX ADMIN — MEROPENEM 500 MG: 500 INJECTION, POWDER, FOR SOLUTION INTRAVENOUS at 11:16

## 2021-01-01 RX ADMIN — Medication 1 AMPULE: at 21:23

## 2021-01-01 RX ADMIN — SERTRALINE HYDROCHLORIDE 125 MG: 25 TABLET ORAL at 11:28

## 2021-01-01 RX ADMIN — Medication 10 ML: at 06:09

## 2021-01-01 RX ADMIN — DOBUTAMINE IN DEXTROSE 5 MCG/KG/MIN: 200 INJECTION, SOLUTION INTRAVENOUS at 16:04

## 2021-01-01 RX ADMIN — TICAGRELOR 90 MG: 90 TABLET ORAL at 08:40

## 2021-01-01 RX ADMIN — CARVEDILOL 12.5 MG: 12.5 TABLET, FILM COATED ORAL at 08:12

## 2021-01-01 RX ADMIN — MEROPENEM 500 MG: 500 INJECTION, POWDER, FOR SOLUTION INTRAVENOUS at 10:47

## 2021-01-01 RX ADMIN — BUSPIRONE HYDROCHLORIDE 10 MG: 10 TABLET ORAL at 17:09

## 2021-01-01 RX ADMIN — IPRATROPIUM BROMIDE AND ALBUTEROL SULFATE 3 ML: .5; 3 SOLUTION RESPIRATORY (INHALATION) at 06:55

## 2021-01-01 RX ADMIN — INSULIN LISPRO 3 UNITS: 100 INJECTION, SOLUTION INTRAVENOUS; SUBCUTANEOUS at 23:28

## 2021-01-01 RX ADMIN — IPRATROPIUM BROMIDE AND ALBUTEROL SULFATE 3 ML: .5; 2.5 SOLUTION RESPIRATORY (INHALATION) at 22:40

## 2021-01-01 RX ADMIN — BUMETANIDE 1 MG/HR: 0.25 INJECTION, SOLUTION INTRAMUSCULAR; INTRAVENOUS at 06:52

## 2021-01-01 RX ADMIN — DEXTROSE MONOHYDRATE AND SODIUM CHLORIDE 50 ML/HR: 5; .9 INJECTION, SOLUTION INTRAVENOUS at 09:19

## 2021-01-01 RX ADMIN — CARVEDILOL 12.5 MG: 12.5 TABLET, FILM COATED ORAL at 17:09

## 2021-01-01 RX ADMIN — FENTANYL CITRATE 25 MCG: 50 INJECTION, SOLUTION INTRAMUSCULAR; INTRAVENOUS at 03:20

## 2021-01-01 RX ADMIN — DEXTROSE MONOHYDRATE 37.5 G: 500 INJECTION PARENTERAL at 17:37

## 2021-01-01 RX ADMIN — ROCURONIUM BROMIDE 40 MG: 10 INJECTION INTRAVENOUS at 21:13

## 2021-01-01 RX ADMIN — HEPARIN SODIUM 5000 UNITS: 5000 INJECTION INTRAVENOUS; SUBCUTANEOUS at 13:34

## 2021-01-01 RX ADMIN — Medication 4 MCG/MIN: at 18:58

## 2021-01-01 RX ADMIN — Medication 10 ML: at 15:24

## 2021-01-01 RX ADMIN — Medication 1 AMPULE: at 21:13

## 2021-01-01 RX ADMIN — MEROPENEM 500 MG: 500 INJECTION, POWDER, FOR SOLUTION INTRAVENOUS at 18:06

## 2021-01-01 RX ADMIN — PROPOFOL 35 MCG/KG/MIN: 10 INJECTION, EMULSION INTRAVENOUS at 11:07

## 2021-01-01 RX ADMIN — ATORVASTATIN CALCIUM 10 MG: 20 TABLET, FILM COATED ORAL at 18:05

## 2021-01-01 RX ADMIN — APIXABAN 5 MG: 5 TABLET, FILM COATED ORAL at 20:57

## 2021-01-01 RX ADMIN — SODIUM CHLORIDE, POTASSIUM CHLORIDE, SODIUM LACTATE AND CALCIUM CHLORIDE 2000 ML: 600; 310; 30; 20 INJECTION, SOLUTION INTRAVENOUS at 03:49

## 2021-01-01 RX ADMIN — BUSPIRONE HYDROCHLORIDE 10 MG: 10 TABLET ORAL at 10:12

## 2021-01-01 RX ADMIN — Medication 10 ML: at 06:32

## 2021-01-01 RX ADMIN — DIPHENHYDRAMINE HYDROCHLORIDE 25 MG: 25 CAPSULE ORAL at 06:09

## 2021-01-01 RX ADMIN — ASPIRIN 81 MG: 81 TABLET, CHEWABLE ORAL at 08:01

## 2021-01-01 RX ADMIN — Medication 10 ML: at 18:06

## 2021-01-01 RX ADMIN — DOBUTAMINE IN DEXTROSE 6 MCG/KG/MIN: 200 INJECTION, SOLUTION INTRAVENOUS at 06:30

## 2021-01-01 RX ADMIN — CHLOROTHIAZIDE SODIUM 250 MG: 500 INJECTION, POWDER, LYOPHILIZED, FOR SOLUTION INTRAVENOUS at 22:55

## 2021-01-01 RX ADMIN — CARVEDILOL 12.5 MG: 12.5 TABLET, FILM COATED ORAL at 09:23

## 2021-01-01 RX ADMIN — FAMOTIDINE 20 MG: 10 INJECTION INTRAVENOUS at 12:04

## 2021-01-01 RX ADMIN — BUSPIRONE HYDROCHLORIDE 10 MG: 10 TABLET ORAL at 08:54

## 2021-01-01 RX ADMIN — NOREPINEPHRINE BITARTRATE 15 MCG/MIN: 1 INJECTION, SOLUTION, CONCENTRATE INTRAVENOUS at 18:05

## 2021-01-01 RX ADMIN — PANTOPRAZOLE SODIUM 40 MG: 40 TABLET, DELAYED RELEASE ORAL at 16:12

## 2021-01-01 RX ADMIN — DOBUTAMINE IN DEXTROSE 7.5 MCG/KG/MIN: 200 INJECTION, SOLUTION INTRAVENOUS at 21:11

## 2021-01-01 RX ADMIN — Medication 44 MCG/MIN: at 10:36

## 2021-01-01 RX ADMIN — BUSPIRONE HYDROCHLORIDE 10 MG: 10 TABLET ORAL at 17:05

## 2021-01-01 RX ADMIN — Medication 40 MCG/MIN: at 06:46

## 2021-01-01 RX ADMIN — CLOPIDOGREL BISULFATE 75 MG: 75 TABLET ORAL at 08:13

## 2021-01-01 RX ADMIN — FENTANYL CITRATE 25 MCG: 50 INJECTION, SOLUTION INTRAMUSCULAR; INTRAVENOUS at 00:47

## 2021-01-01 RX ADMIN — CASTOR OIL AND BALSAM, PERU: 788; 87 OINTMENT TOPICAL at 08:20

## 2021-01-01 RX ADMIN — FENTANYL CITRATE 50 MCG: 50 INJECTION, SOLUTION INTRAMUSCULAR; INTRAVENOUS at 00:36

## 2021-01-01 RX ADMIN — PROPOFOL 45 MCG/KG/MIN: 10 INJECTION, EMULSION INTRAVENOUS at 22:10

## 2021-01-01 RX ADMIN — NOREPINEPHRINE BITARTRATE 55 MCG/MIN: 1 INJECTION, SOLUTION, CONCENTRATE INTRAVENOUS at 21:51

## 2021-01-01 RX ADMIN — PROPOFOL 35 MCG/KG/MIN: 10 INJECTION, EMULSION INTRAVENOUS at 22:01

## 2021-01-01 RX ADMIN — BUMETANIDE 2 MG: 0.25 INJECTION, SOLUTION INTRAMUSCULAR; INTRAVENOUS at 17:05

## 2021-01-01 RX ADMIN — WARFARIN SODIUM 4 MG: 2 TABLET ORAL at 09:17

## 2021-01-01 RX ADMIN — CASTOR OIL AND BALSAM, PERU: 788; 87 OINTMENT TOPICAL at 09:15

## 2021-01-01 RX ADMIN — HEPARIN SODIUM 5000 UNITS: 5000 INJECTION INTRAVENOUS; SUBCUTANEOUS at 21:00

## 2021-01-01 RX ADMIN — INSULIN LISPRO 2 UNITS: 100 INJECTION, SOLUTION INTRAVENOUS; SUBCUTANEOUS at 13:31

## 2021-01-01 RX ADMIN — Medication 10 ML: at 20:30

## 2021-01-01 RX ADMIN — DOBUTAMINE IN DEXTROSE 10 MCG/KG/MIN: 200 INJECTION, SOLUTION INTRAVENOUS at 08:12

## 2021-01-01 RX ADMIN — HEPARIN SODIUM AND DEXTROSE 8 UNITS/KG/HR: 10000; 5 INJECTION INTRAVENOUS at 00:00

## 2021-01-01 RX ADMIN — DOBUTAMINE IN DEXTROSE 10 MCG/KG/MIN: 200 INJECTION, SOLUTION INTRAVENOUS at 16:51

## 2021-01-01 RX ADMIN — VANCOMYCIN HYDROCHLORIDE 750 MG: 750 INJECTION, POWDER, LYOPHILIZED, FOR SOLUTION INTRAVENOUS at 13:09

## 2021-01-01 RX ADMIN — PANTOPRAZOLE SODIUM 40 MG: 40 TABLET, DELAYED RELEASE ORAL at 08:20

## 2021-01-01 RX ADMIN — POTASSIUM CHLORIDE 10 MEQ: 7.46 INJECTION, SOLUTION INTRAVENOUS at 10:00

## 2021-01-01 RX ADMIN — CHLORHEXIDINE GLUCONATE 15 ML: 0.12 RINSE ORAL at 08:41

## 2021-01-01 RX ADMIN — Medication 10 ML: at 23:40

## 2021-01-01 RX ADMIN — BUMETANIDE 1 MG/HR: 0.25 INJECTION, SOLUTION INTRAMUSCULAR; INTRAVENOUS at 17:31

## 2021-01-01 RX ADMIN — NITROGLYCERIN 1 INCH: 20 OINTMENT TOPICAL at 17:23

## 2021-01-01 RX ADMIN — POTASSIUM BICARBONATE 40 MEQ: 782 TABLET, EFFERVESCENT ORAL at 10:01

## 2021-01-01 RX ADMIN — Medication 1 AMPULE: at 22:18

## 2021-01-01 RX ADMIN — NITROGLYCERIN 0.4 MG: 0.4 TABLET, ORALLY DISINTEGRATING SUBLINGUAL at 22:41

## 2021-01-01 RX ADMIN — Medication 1 AMPULE: at 08:23

## 2021-01-01 RX ADMIN — DOBUTAMINE IN DEXTROSE 8 MCG/KG/MIN: 200 INJECTION, SOLUTION INTRAVENOUS at 06:40

## 2021-01-01 RX ADMIN — Medication 75 MCG/HR: at 04:23

## 2021-01-01 RX ADMIN — Medication 10 ML: at 05:13

## 2021-01-01 RX ADMIN — CLOPIDOGREL BISULFATE 75 MG: 75 TABLET ORAL at 08:48

## 2021-01-01 RX ADMIN — CARVEDILOL 12.5 MG: 12.5 TABLET, FILM COATED ORAL at 17:23

## 2021-01-01 RX ADMIN — BUSPIRONE HYDROCHLORIDE 10 MG: 10 TABLET ORAL at 08:33

## 2021-01-01 RX ADMIN — SERTRALINE HYDROCHLORIDE 125 MG: 25 TABLET ORAL at 08:33

## 2021-01-01 RX ADMIN — SERTRALINE HYDROCHLORIDE 125 MG: 25 TABLET ORAL at 09:51

## 2021-01-01 RX ADMIN — CASTOR OIL AND BALSAM, PERU: 788; 87 OINTMENT TOPICAL at 17:17

## 2021-01-01 RX ADMIN — ATORVASTATIN CALCIUM 10 MG: 20 TABLET, FILM COATED ORAL at 08:20

## 2021-01-01 RX ADMIN — Medication 10 ML: at 21:26

## 2021-01-01 RX ADMIN — Medication 10 ML: at 22:59

## 2021-01-01 RX ADMIN — DOBUTAMINE IN DEXTROSE 9 MCG/KG/MIN: 200 INJECTION, SOLUTION INTRAVENOUS at 05:01

## 2021-01-01 RX ADMIN — DOBUTAMINE IN DEXTROSE 10 MCG/KG/MIN: 200 INJECTION, SOLUTION INTRAVENOUS at 16:37

## 2021-01-01 RX ADMIN — ACETAMINOPHEN 650 MG: 325 TABLET ORAL at 23:24

## 2021-01-01 RX ADMIN — Medication 10 ML: at 21:50

## 2021-01-01 RX ADMIN — DOBUTAMINE IN DEXTROSE 7 MCG/KG/MIN: 200 INJECTION, SOLUTION INTRAVENOUS at 01:19

## 2021-01-01 RX ADMIN — INSULIN LISPRO 2 UNITS: 100 INJECTION, SOLUTION INTRAVENOUS; SUBCUTANEOUS at 17:49

## 2021-01-01 RX ADMIN — CASTOR OIL AND BALSAM, PERU: 788; 87 OINTMENT TOPICAL at 08:42

## 2021-01-01 RX ADMIN — Medication 10 ML: at 22:15

## 2021-01-01 RX ADMIN — APIXABAN 5 MG: 5 TABLET, FILM COATED ORAL at 08:54

## 2021-01-01 RX ADMIN — APIXABAN 5 MG: 5 TABLET, FILM COATED ORAL at 21:43

## 2021-01-01 RX ADMIN — ASPIRIN 81 MG: 81 TABLET, CHEWABLE ORAL at 08:48

## 2021-01-01 RX ADMIN — INSULIN LISPRO 2 UNITS: 100 INJECTION, SOLUTION INTRAVENOUS; SUBCUTANEOUS at 12:31

## 2021-01-01 RX ADMIN — TICAGRELOR 90 MG: 90 TABLET ORAL at 08:00

## 2021-01-01 RX ADMIN — FENTANYL CITRATE 25 MCG: 50 INJECTION, SOLUTION INTRAMUSCULAR; INTRAVENOUS at 05:12

## 2021-01-01 RX ADMIN — DOBUTAMINE IN DEXTROSE 10 MCG/KG/MIN: 200 INJECTION, SOLUTION INTRAVENOUS at 09:19

## 2021-01-01 RX ADMIN — CARVEDILOL 12.5 MG: 12.5 TABLET, FILM COATED ORAL at 10:12

## 2021-01-01 RX ADMIN — Medication 40 ML: at 11:01

## 2021-01-01 RX ADMIN — INSULIN LISPRO 2 UNITS: 100 INJECTION, SOLUTION INTRAVENOUS; SUBCUTANEOUS at 16:12

## 2021-01-01 RX ADMIN — FENTANYL CITRATE 50 MCG: 50 INJECTION, SOLUTION INTRAMUSCULAR; INTRAVENOUS at 10:23

## 2021-01-01 RX ADMIN — INSULIN LISPRO 2 UNITS: 100 INJECTION, SOLUTION INTRAVENOUS; SUBCUTANEOUS at 23:39

## 2021-01-01 RX ADMIN — VASOPRESSIN 0.03 UNITS/MIN: 20 INJECTION INTRAVENOUS at 20:00

## 2021-01-01 RX ADMIN — Medication 10 ML: at 15:45

## 2021-01-01 RX ADMIN — BUSPIRONE HYDROCHLORIDE 10 MG: 10 TABLET ORAL at 18:00

## 2021-01-01 RX ADMIN — SERTRALINE HYDROCHLORIDE 125 MG: 25 TABLET ORAL at 08:23

## 2021-01-01 RX ADMIN — HEPARIN SODIUM 5000 UNITS: 5000 INJECTION INTRAVENOUS; SUBCUTANEOUS at 22:15

## 2021-01-01 RX ADMIN — PROPOFOL 35 MCG/KG/MIN: 10 INJECTION, EMULSION INTRAVENOUS at 08:26

## 2021-01-01 RX ADMIN — BUSPIRONE HYDROCHLORIDE 10 MG: 10 TABLET ORAL at 18:06

## 2021-01-01 RX ADMIN — ACETAMINOPHEN 650 MG: 325 TABLET ORAL at 09:04

## 2021-01-01 RX ADMIN — DEXTROSE MONOHYDRATE 12.5 G: 500 INJECTION PARENTERAL at 11:14

## 2021-01-01 RX ADMIN — CLOPIDOGREL BISULFATE 75 MG: 75 TABLET ORAL at 08:01

## 2021-01-01 RX ADMIN — FENTANYL CITRATE 25 MCG: 50 INJECTION, SOLUTION INTRAMUSCULAR; INTRAVENOUS at 08:37

## 2021-01-01 RX ADMIN — Medication 10 ML: at 17:28

## 2021-01-01 RX ADMIN — PROPOFOL 40 MCG/KG/MIN: 10 INJECTION, EMULSION INTRAVENOUS at 02:46

## 2021-01-01 RX ADMIN — FENTANYL CITRATE 25 MCG: 50 INJECTION, SOLUTION INTRAMUSCULAR; INTRAVENOUS at 19:57

## 2021-01-01 RX ADMIN — Medication 10 ML: at 04:04

## 2021-01-01 RX ADMIN — POTASSIUM CHLORIDE 20 MEQ: 400 INJECTION, SOLUTION INTRAVENOUS at 08:19

## 2021-01-01 RX ADMIN — SODIUM CHLORIDE, POTASSIUM CHLORIDE, SODIUM LACTATE AND CALCIUM CHLORIDE 1000 ML: 600; 310; 30; 20 INJECTION, SOLUTION INTRAVENOUS at 05:02

## 2021-01-01 RX ADMIN — Medication 10 ML: at 17:21

## 2021-01-01 RX ADMIN — ASPIRIN 81 MG: 81 TABLET, COATED ORAL at 08:40

## 2021-01-01 RX ADMIN — CALCIUM GLUCONATE 1 G: 98 INJECTION, SOLUTION INTRAVENOUS at 08:23

## 2021-01-01 RX ADMIN — CLOPIDOGREL BISULFATE 75 MG: 75 TABLET ORAL at 08:43

## 2021-01-01 RX ADMIN — CARVEDILOL 12.5 MG: 12.5 TABLET, FILM COATED ORAL at 08:54

## 2021-01-01 RX ADMIN — DOBUTAMINE IN DEXTROSE 10 MCG/KG/MIN: 200 INJECTION, SOLUTION INTRAVENOUS at 16:17

## 2021-01-01 RX ADMIN — Medication 10 ML: at 13:17

## 2021-01-01 RX ADMIN — ASPIRIN 81 MG: 81 TABLET, COATED ORAL at 06:09

## 2021-01-01 RX ADMIN — DOBUTAMINE 6 MCG/KG/MIN: 12.5 INJECTION, SOLUTION, CONCENTRATE INTRAVENOUS at 22:50

## 2021-01-01 RX ADMIN — CASTOR OIL AND BALSAM, PERU: 788; 87 OINTMENT TOPICAL at 17:31

## 2021-01-01 RX ADMIN — BUMETANIDE 1 MG: 1 TABLET ORAL at 18:35

## 2021-01-01 RX ADMIN — Medication 1 AMPULE: at 08:04

## 2021-01-01 RX ADMIN — CASTOR OIL AND BALSAM, PERU: 788; 87 OINTMENT TOPICAL at 17:43

## 2021-01-01 RX ADMIN — BUMETANIDE 1 MG: 0.25 INJECTION, SOLUTION INTRAMUSCULAR; INTRAVENOUS at 11:27

## 2021-01-01 RX ADMIN — INSULIN LISPRO 5 UNITS: 100 INJECTION, SOLUTION INTRAVENOUS; SUBCUTANEOUS at 18:39

## 2021-01-01 RX ADMIN — PANTOPRAZOLE SODIUM 40 MG: 40 TABLET, DELAYED RELEASE ORAL at 08:23

## 2021-01-01 RX ADMIN — HEPARIN SODIUM AND DEXTROSE 13 UNITS/KG/HR: 10000; 5 INJECTION INTRAVENOUS at 20:03

## 2021-01-01 RX ADMIN — BUMETANIDE 1 MG/HR: 0.25 INJECTION, SOLUTION INTRAMUSCULAR; INTRAVENOUS at 17:54

## 2021-01-01 RX ADMIN — MEROPENEM 500 MG: 500 INJECTION, POWDER, FOR SOLUTION INTRAVENOUS at 20:04

## 2021-01-01 RX ADMIN — Medication 10 ML: at 13:37

## 2021-01-01 RX ADMIN — ACETAMINOPHEN 650 MG: 325 TABLET ORAL at 20:21

## 2021-01-01 RX ADMIN — Medication 36 MCG/MIN: at 14:43

## 2021-01-01 RX ADMIN — PROPOFOL 35 MCG/KG/MIN: 10 INJECTION, EMULSION INTRAVENOUS at 18:04

## 2021-01-01 RX ADMIN — IPRATROPIUM BROMIDE AND ALBUTEROL SULFATE 3 ML: .5; 3 SOLUTION RESPIRATORY (INHALATION) at 22:40

## 2021-01-01 RX ADMIN — PANTOPRAZOLE SODIUM 40 MG: 40 TABLET, DELAYED RELEASE ORAL at 06:30

## 2021-01-01 RX ADMIN — Medication 10 ML: at 18:14

## 2021-01-01 RX ADMIN — POTASSIUM CHLORIDE 40 MEQ: 750 TABLET, FILM COATED, EXTENDED RELEASE ORAL at 06:33

## 2021-01-01 RX ADMIN — FENTANYL CITRATE 25 MCG: 50 INJECTION, SOLUTION INTRAMUSCULAR; INTRAVENOUS at 20:20

## 2021-01-01 RX ADMIN — EPINEPHRINE 10 MCG/MIN: 1 INJECTION INTRAMUSCULAR; INTRAVENOUS; SUBCUTANEOUS at 21:29

## 2021-01-01 RX ADMIN — BUSPIRONE HYDROCHLORIDE 10 MG: 10 TABLET ORAL at 17:23

## 2021-01-01 RX ADMIN — CASTOR OIL AND BALSAM, PERU: 788; 87 OINTMENT TOPICAL at 18:40

## 2021-01-01 RX ADMIN — BUMETANIDE 1 MG: 1 TABLET ORAL at 18:05

## 2021-01-01 RX ADMIN — INSULIN LISPRO 2 UNITS: 100 INJECTION, SOLUTION INTRAVENOUS; SUBCUTANEOUS at 11:38

## 2021-01-01 RX ADMIN — HEPARIN SODIUM 3000 UNITS: 5000 INJECTION INTRAVENOUS; SUBCUTANEOUS at 23:45

## 2021-01-01 RX ADMIN — FENTANYL CITRATE 25 MCG: 50 INJECTION, SOLUTION INTRAMUSCULAR; INTRAVENOUS at 20:10

## 2021-01-01 RX ADMIN — INSULIN GLARGINE 10 UNITS: 100 INJECTION, SOLUTION SUBCUTANEOUS at 22:39

## 2021-01-01 RX ADMIN — SERTRALINE HYDROCHLORIDE 125 MG: 25 TABLET ORAL at 10:11

## 2021-01-01 RX ADMIN — ATORVASTATIN CALCIUM 10 MG: 20 TABLET, FILM COATED ORAL at 08:49

## 2021-01-01 RX ADMIN — PANTOPRAZOLE SODIUM 40 MG: 40 TABLET, DELAYED RELEASE ORAL at 17:09

## 2021-01-01 RX ADMIN — Medication 1 AMPULE: at 08:42

## 2021-01-01 RX ADMIN — INSULIN LISPRO 3 UNITS: 100 INJECTION, SOLUTION INTRAVENOUS; SUBCUTANEOUS at 22:15

## 2021-01-01 RX ADMIN — Medication 10 ML: at 05:45

## 2021-01-01 RX ADMIN — CARVEDILOL 12.5 MG: 12.5 TABLET, FILM COATED ORAL at 08:33

## 2021-01-01 RX ADMIN — POTASSIUM CHLORIDE 40 MEQ: 1.5 FOR SOLUTION ORAL at 12:12

## 2021-01-01 RX ADMIN — PREDNISONE 40 MG: 20 TABLET ORAL at 06:10

## 2021-01-01 RX ADMIN — FAMOTIDINE 20 MG: 20 TABLET ORAL at 10:12

## 2021-01-01 RX ADMIN — BUMETANIDE 2 MG: 0.25 INJECTION, SOLUTION INTRAMUSCULAR; INTRAVENOUS at 08:25

## 2021-01-01 RX ADMIN — INSULIN LISPRO 5 UNITS: 100 INJECTION, SOLUTION INTRAVENOUS; SUBCUTANEOUS at 09:02

## 2021-01-01 RX ADMIN — CASTOR OIL AND BALSAM, PERU: 788; 87 OINTMENT TOPICAL at 08:06

## 2021-01-01 RX ADMIN — BUMETANIDE 2 MG: 0.25 INJECTION, SOLUTION INTRAMUSCULAR; INTRAVENOUS at 11:07

## 2021-01-01 RX ADMIN — ATORVASTATIN CALCIUM 10 MG: 20 TABLET, FILM COATED ORAL at 11:01

## 2021-01-01 RX ADMIN — CASTOR OIL AND BALSAM, PERU: 788; 87 OINTMENT TOPICAL at 08:49

## 2021-01-01 RX ADMIN — FENTANYL CITRATE 50 MCG: 50 INJECTION, SOLUTION INTRAMUSCULAR; INTRAVENOUS at 20:52

## 2021-01-01 RX ADMIN — DOBUTAMINE IN DEXTROSE 10 MCG/KG/MIN: 200 INJECTION, SOLUTION INTRAVENOUS at 00:33

## 2021-01-01 RX ADMIN — APIXABAN 5 MG: 5 TABLET, FILM COATED ORAL at 09:05

## 2021-01-01 RX ADMIN — PANTOPRAZOLE SODIUM 40 MG: 40 TABLET, DELAYED RELEASE ORAL at 18:06

## 2021-01-01 RX ADMIN — Medication 10 ML: at 21:04

## 2021-01-01 RX ADMIN — HEPARIN SODIUM AND DEXTROSE 13 UNITS/KG/HR: 10000; 5 INJECTION INTRAVENOUS at 13:27

## 2021-01-01 RX ADMIN — Medication 15 MCG/MIN: at 01:40

## 2021-01-01 RX ADMIN — Medication 10 ML: at 13:22

## 2021-01-01 RX ADMIN — BUSPIRONE HYDROCHLORIDE 10 MG: 10 TABLET ORAL at 08:23

## 2021-01-01 RX ADMIN — BUMETANIDE 2 MG: 0.25 INJECTION, SOLUTION INTRAMUSCULAR; INTRAVENOUS at 08:13

## 2021-01-01 RX ADMIN — ASPIRIN 81 MG: 81 TABLET, CHEWABLE ORAL at 08:43

## 2021-01-01 RX ADMIN — SERTRALINE HYDROCHLORIDE 125 MG: 25 TABLET ORAL at 08:34

## 2021-01-01 RX ADMIN — DOBUTAMINE IN DEXTROSE 10 MCG/KG/MIN: 200 INJECTION, SOLUTION INTRAVENOUS at 01:18

## 2021-01-01 RX ADMIN — Medication 10 ML: at 05:34

## 2021-01-01 RX ADMIN — HEPARIN SODIUM AND DEXTROSE 10 UNITS/KG/HR: 10000; 5 INJECTION INTRAVENOUS at 07:28

## 2021-01-01 RX ADMIN — PANTOPRAZOLE SODIUM 40 MG: 40 TABLET, DELAYED RELEASE ORAL at 18:00

## 2021-01-01 RX ADMIN — FENTANYL CITRATE 25 MCG: 50 INJECTION, SOLUTION INTRAMUSCULAR; INTRAVENOUS at 09:36

## 2021-01-01 RX ADMIN — APIXABAN 5 MG: 5 TABLET, FILM COATED ORAL at 21:25

## 2021-01-01 RX ADMIN — FAMOTIDINE 20 MG: 20 TABLET ORAL at 13:24

## 2021-01-01 RX ADMIN — Medication 10 ML: at 21:05

## 2021-01-01 RX ADMIN — Medication 10 ML: at 15:09

## 2021-01-01 RX ADMIN — PREDNISONE 40 MG: 20 TABLET ORAL at 08:00

## 2021-01-01 RX ADMIN — BUMETANIDE 2 MG: 0.25 INJECTION, SOLUTION INTRAMUSCULAR; INTRAVENOUS at 09:06

## 2021-01-01 RX ADMIN — VASOPRESSIN 0.03 UNITS/MIN: 20 INJECTION INTRAVENOUS at 05:38

## 2021-01-01 RX ADMIN — DOBUTAMINE IN DEXTROSE 10 MCG/KG/MIN: 200 INJECTION, SOLUTION INTRAVENOUS at 18:44

## 2021-01-01 RX ADMIN — PROPOFOL 25 MCG/KG/MIN: 10 INJECTION, EMULSION INTRAVENOUS at 21:57

## 2021-01-01 RX ADMIN — INSULIN GLARGINE 10 UNITS: 100 INJECTION, SOLUTION SUBCUTANEOUS at 20:43

## 2021-01-01 RX ADMIN — Medication 10 ML: at 22:39

## 2021-01-01 RX ADMIN — POTASSIUM CHLORIDE 20 MEQ: 400 INJECTION, SOLUTION INTRAVENOUS at 09:24

## 2021-01-01 RX ADMIN — FENTANYL CITRATE 50 MCG: 50 INJECTION, SOLUTION INTRAMUSCULAR; INTRAVENOUS at 08:43

## 2021-01-01 RX ADMIN — PREDNISONE 40 MG: 20 TABLET ORAL at 10:12

## 2021-01-01 RX ADMIN — CHLORHEXIDINE GLUCONATE 15 ML: 0.12 RINSE ORAL at 21:12

## 2021-01-01 RX ADMIN — ATORVASTATIN CALCIUM 10 MG: 20 TABLET, FILM COATED ORAL at 08:43

## 2021-01-01 RX ADMIN — ALBUMIN (HUMAN) 25 G: 12.5 INJECTION, SOLUTION INTRAVENOUS at 10:02

## 2021-01-01 RX ADMIN — APIXABAN 5 MG: 5 TABLET, FILM COATED ORAL at 08:25

## 2021-01-01 RX ADMIN — PREDNISONE 40 MG: 20 TABLET ORAL at 17:05

## 2021-01-01 RX ADMIN — Medication 1 AMPULE: at 08:20

## 2021-01-01 RX ADMIN — HEPARIN SODIUM 5000 UNITS: 5000 INJECTION INTRAVENOUS; SUBCUTANEOUS at 13:16

## 2021-01-01 RX ADMIN — CHLOROTHIAZIDE SODIUM 250 MG: 500 INJECTION, POWDER, LYOPHILIZED, FOR SOLUTION INTRAVENOUS at 21:31

## 2021-01-01 RX ADMIN — ASPIRIN 81 MG: 81 TABLET, CHEWABLE ORAL at 08:20

## 2021-01-01 RX ADMIN — Medication 10 ML: at 15:47

## 2021-01-01 RX ADMIN — MEROPENEM 500 MG: 500 INJECTION, POWDER, FOR SOLUTION INTRAVENOUS at 04:20

## 2021-01-01 RX ADMIN — DEXTROSE MONOHYDRATE 75 ML/HR: 50 INJECTION, SOLUTION INTRAVENOUS at 10:00

## 2021-01-01 RX ADMIN — EPINEPHRINE 5 MCG/MIN: 1 INJECTION INTRAMUSCULAR; INTRAVENOUS; SUBCUTANEOUS at 23:32

## 2021-01-01 RX ADMIN — DOBUTAMINE IN DEXTROSE 10 MCG/KG/MIN: 200 INJECTION, SOLUTION INTRAVENOUS at 08:26

## 2021-01-01 RX ADMIN — NITROGLYCERIN 0.4 MG: 0.4 TABLET, ORALLY DISINTEGRATING SUBLINGUAL at 03:44

## 2021-01-01 RX ADMIN — PANTOPRAZOLE SODIUM 40 MG: 40 TABLET, DELAYED RELEASE ORAL at 17:05

## 2021-01-01 RX ADMIN — CASTOR OIL AND BALSAM, PERU: 788; 87 OINTMENT TOPICAL at 17:07

## 2021-01-01 RX ADMIN — Medication 1 AMPULE: at 09:05

## 2021-01-01 RX ADMIN — OXYCODONE 10 MG: 5 TABLET ORAL at 10:35

## 2021-01-01 RX ADMIN — PANTOPRAZOLE SODIUM 40 MG: 40 TABLET, DELAYED RELEASE ORAL at 08:40

## 2021-01-01 RX ADMIN — PANTOPRAZOLE SODIUM 40 MG: 40 TABLET, DELAYED RELEASE ORAL at 10:12

## 2021-01-01 RX ADMIN — ASPIRIN 81 MG: 81 TABLET, COATED ORAL at 08:00

## 2021-01-01 RX ADMIN — BUSPIRONE HYDROCHLORIDE 10 MG: 10 TABLET ORAL at 09:52

## 2021-01-01 RX ADMIN — Medication 40 ML: at 15:06

## 2021-01-01 RX ADMIN — Medication 10 ML: at 14:00

## 2021-01-01 RX ADMIN — CARVEDILOL 12.5 MG: 12.5 TABLET, FILM COATED ORAL at 17:36

## 2021-01-01 RX ADMIN — PANTOPRAZOLE SODIUM 40 MG: 40 TABLET, DELAYED RELEASE ORAL at 08:12

## 2021-01-01 RX ADMIN — INSULIN GLARGINE 15 UNITS: 100 INJECTION, SOLUTION SUBCUTANEOUS at 21:49

## 2021-01-01 RX ADMIN — Medication 1 AMPULE: at 08:27

## 2021-01-01 RX ADMIN — BUMETANIDE 1 MG/HR: 0.25 INJECTION, SOLUTION INTRAMUSCULAR; INTRAVENOUS at 04:35

## 2021-01-01 RX ADMIN — HEPARIN SODIUM AND DEXTROSE 19 UNITS/KG/HR: 10000; 5 INJECTION INTRAVENOUS at 16:02

## 2021-01-01 RX ADMIN — Medication 10 ML: at 20:53

## 2021-01-01 RX ADMIN — PROPOFOL 25 MCG/KG/MIN: 10 INJECTION, EMULSION INTRAVENOUS at 14:08

## 2021-01-01 RX ADMIN — DOBUTAMINE IN DEXTROSE 10 MCG/KG/MIN: 200 INJECTION, SOLUTION INTRAVENOUS at 09:06

## 2021-01-01 RX ADMIN — Medication 200 MCG: at 21:03

## 2021-01-01 RX ADMIN — BUMETANIDE 2 MG: 0.25 INJECTION, SOLUTION INTRAMUSCULAR; INTRAVENOUS at 18:14

## 2021-01-01 RX ADMIN — BUMETANIDE 2 MG: 0.25 INJECTION, SOLUTION INTRAMUSCULAR; INTRAVENOUS at 17:32

## 2021-01-01 RX ADMIN — CALCIUM GLUCONATE 2 G: 98 INJECTION, SOLUTION INTRAVENOUS at 23:38

## 2021-01-01 RX ADMIN — PROPOFOL 35 MCG/KG/MIN: 10 INJECTION, EMULSION INTRAVENOUS at 14:16

## 2021-01-01 RX ADMIN — CHLOROTHIAZIDE SODIUM 250 MG: 500 INJECTION, POWDER, LYOPHILIZED, FOR SOLUTION INTRAVENOUS at 10:49

## 2021-01-01 RX ADMIN — HEPARIN SODIUM AND DEXTROSE 17 UNITS/KG/HR: 10000; 5 INJECTION INTRAVENOUS at 18:27

## 2021-01-01 RX ADMIN — SODIUM CHLORIDE, POTASSIUM CHLORIDE, SODIUM LACTATE AND CALCIUM CHLORIDE 1000 ML: 600; 310; 30; 20 INJECTION, SOLUTION INTRAVENOUS at 00:00

## 2021-01-01 RX ADMIN — OXYCODONE 5 MG: 5 TABLET ORAL at 01:38

## 2021-01-01 RX ADMIN — APIXABAN 5 MG: 5 TABLET, FILM COATED ORAL at 21:29

## 2021-01-01 RX ADMIN — NITROGLYCERIN 1 INCH: 20 OINTMENT TOPICAL at 11:27

## 2021-01-01 RX ADMIN — BUMETANIDE 2 MG: 0.25 INJECTION, SOLUTION INTRAMUSCULAR; INTRAVENOUS at 18:06

## 2021-01-01 RX ADMIN — HEPARIN SODIUM AND DEXTROSE 19 UNITS/KG/HR: 10000; 5 INJECTION INTRAVENOUS at 10:50

## 2021-01-01 RX ADMIN — Medication 10 ML: at 22:55

## 2021-01-01 RX ADMIN — Medication 1 AMPULE: at 08:40

## 2021-01-01 RX ADMIN — Medication 1 AMPULE: at 21:11

## 2021-01-01 RX ADMIN — ASPIRIN 81 MG: 81 TABLET, CHEWABLE ORAL at 14:14

## 2021-01-01 RX ADMIN — Medication 10 ML: at 20:57

## 2021-01-01 RX ADMIN — ASPIRIN 81 MG: 81 TABLET, CHEWABLE ORAL at 11:01

## 2021-01-01 RX ADMIN — DOBUTAMINE IN DEXTROSE 5 MCG/KG/MIN: 200 INJECTION, SOLUTION INTRAVENOUS at 13:14

## 2021-01-01 RX ADMIN — Medication 10 ML: at 23:53

## 2021-01-01 RX ADMIN — CARVEDILOL 12.5 MG: 12.5 TABLET, FILM COATED ORAL at 09:53

## 2021-01-01 RX ADMIN — BUMETANIDE 1 MG: 1 TABLET ORAL at 08:40

## 2021-01-01 RX ADMIN — SODIUM CHLORIDE, POTASSIUM CHLORIDE, SODIUM LACTATE AND CALCIUM CHLORIDE 1000 ML: 600; 310; 30; 20 INJECTION, SOLUTION INTRAVENOUS at 07:56

## 2021-01-01 RX ADMIN — BUMETANIDE 2 MG: 0.25 INJECTION, SOLUTION INTRAMUSCULAR; INTRAVENOUS at 17:55

## 2021-01-01 RX ADMIN — DEXTROSE MONOHYDRATE 50 ML/HR: 100 INJECTION, SOLUTION INTRAVENOUS at 13:40

## 2021-01-01 RX ADMIN — PROPOFOL 30 MCG/KG/MIN: 10 INJECTION, EMULSION INTRAVENOUS at 16:30

## 2021-01-01 RX ADMIN — PANTOPRAZOLE SODIUM 40 MG: 40 TABLET, DELAYED RELEASE ORAL at 09:23

## 2021-01-01 RX ADMIN — MEROPENEM 500 MG: 500 INJECTION, POWDER, FOR SOLUTION INTRAVENOUS at 02:50

## 2021-01-01 RX ADMIN — CHLORHEXIDINE GLUCONATE 15 ML: 0.12 RINSE ORAL at 21:58

## 2021-01-01 RX ADMIN — BUSPIRONE HYDROCHLORIDE 10 MG: 10 TABLET ORAL at 08:12

## 2021-01-01 RX ADMIN — ASPIRIN 81 MG: 81 TABLET, COATED ORAL at 08:23

## 2021-01-01 RX ADMIN — INSULIN LISPRO 3 UNITS: 100 INJECTION, SOLUTION INTRAVENOUS; SUBCUTANEOUS at 16:04

## 2021-01-01 RX ADMIN — EPINEPHRINE 13 MCG/MIN: 1 INJECTION INTRAMUSCULAR; INTRAVENOUS; SUBCUTANEOUS at 21:51

## 2021-01-01 RX ADMIN — DOBUTAMINE 5 MCG/KG/MIN: 12.5 INJECTION, SOLUTION, CONCENTRATE INTRAVENOUS at 09:54

## 2021-01-01 RX ADMIN — BUMETANIDE 2 MG: 0.25 INJECTION, SOLUTION INTRAMUSCULAR; INTRAVENOUS at 08:35

## 2021-01-01 RX ADMIN — Medication 10 ML: at 13:34

## 2021-01-01 RX ADMIN — DEXTROSE MONOHYDRATE 50 ML/HR: 50 INJECTION, SOLUTION INTRAVENOUS at 18:26

## 2021-01-01 RX ADMIN — PROPOFOL 40 MCG/KG/MIN: 10 INJECTION, EMULSION INTRAVENOUS at 06:22

## 2021-01-01 RX ADMIN — Medication 1 AMPULE: at 08:44

## 2021-01-01 RX ADMIN — Medication 10 ML: at 21:03

## 2021-01-01 RX ADMIN — PROPOFOL 35 MCG/KG/MIN: 10 INJECTION, EMULSION INTRAVENOUS at 04:58

## 2021-01-01 RX ADMIN — Medication 1 AMPULE: at 21:06

## 2021-01-01 RX ADMIN — DEXTROSE MONOHYDRATE 25 G: 500 INJECTION PARENTERAL at 23:19

## 2021-01-01 RX ADMIN — CASTOR OIL AND BALSAM, PERU: 788; 87 OINTMENT TOPICAL at 17:26

## 2021-01-01 RX ADMIN — IRON SUCROSE 200 MG: 20 INJECTION, SOLUTION INTRAVENOUS at 08:49

## 2021-01-01 RX ADMIN — Medication 1 AMPULE: at 08:15

## 2021-01-01 RX ADMIN — PROPOFOL 35 MCG/KG/MIN: 10 INJECTION, EMULSION INTRAVENOUS at 15:39

## 2021-01-01 RX ADMIN — POTASSIUM CHLORIDE 20 MEQ: 400 INJECTION, SOLUTION INTRAVENOUS at 08:54

## 2021-01-01 RX ADMIN — INSULIN LISPRO 2 UNITS: 100 INJECTION, SOLUTION INTRAVENOUS; SUBCUTANEOUS at 16:03

## 2021-01-01 RX ADMIN — BUSPIRONE HYDROCHLORIDE 10 MG: 10 TABLET ORAL at 18:13

## 2021-01-01 RX ADMIN — CHLORHEXIDINE GLUCONATE 15 ML: 0.12 RINSE ORAL at 08:27

## 2021-01-01 RX ADMIN — CASTOR OIL AND BALSAM, PERU: 788; 87 OINTMENT TOPICAL at 18:09

## 2021-01-01 RX ADMIN — APIXABAN 5 MG: 5 TABLET, FILM COATED ORAL at 08:53

## 2021-01-01 RX ADMIN — FAMOTIDINE 20 MG: 10 INJECTION INTRAVENOUS at 11:20

## 2021-01-01 RX ADMIN — BUMETANIDE 1 MG: 1 TABLET ORAL at 08:24

## 2021-01-01 RX ADMIN — HEPARIN SODIUM 5000 UNITS: 5000 INJECTION INTRAVENOUS; SUBCUTANEOUS at 22:19

## 2021-01-01 RX ADMIN — ACETAMINOPHEN 650 MG: 325 TABLET ORAL at 21:33

## 2021-01-01 RX ADMIN — FENTANYL CITRATE 50 MCG: 50 INJECTION, SOLUTION INTRAMUSCULAR; INTRAVENOUS at 04:27

## 2021-01-01 RX ADMIN — ACETAMINOPHEN 650 MG: 325 TABLET ORAL at 05:25

## 2021-01-01 RX ADMIN — CARVEDILOL 12.5 MG: 12.5 TABLET, FILM COATED ORAL at 09:22

## 2021-01-01 RX ADMIN — EPINEPHRINE 13 MCG/MIN: 1 INJECTION INTRAMUSCULAR; INTRAVENOUS; SUBCUTANEOUS at 02:07

## 2021-01-01 RX ADMIN — SERTRALINE HYDROCHLORIDE 125 MG: 25 TABLET ORAL at 08:53

## 2021-01-01 RX ADMIN — SODIUM CHLORIDE, POTASSIUM CHLORIDE, SODIUM LACTATE AND CALCIUM CHLORIDE 1000 ML: 600; 310; 30; 20 INJECTION, SOLUTION INTRAVENOUS at 23:00

## 2021-01-01 RX ADMIN — BUMETANIDE 1 MG: 1 TABLET ORAL at 08:00

## 2021-01-01 RX ADMIN — FENTANYL CITRATE 25 MCG: 50 INJECTION, SOLUTION INTRAMUSCULAR; INTRAVENOUS at 20:01

## 2021-01-01 RX ADMIN — POLYETHYLENE GLYCOL 3350 17 G: 17 POWDER, FOR SOLUTION ORAL at 11:01

## 2021-01-01 RX ADMIN — NITROGLYCERIN 0.4 MG: 0.4 TABLET, ORALLY DISINTEGRATING SUBLINGUAL at 22:55

## 2021-01-01 RX ADMIN — MEROPENEM 500 MG: 500 INJECTION, POWDER, FOR SOLUTION INTRAVENOUS at 11:55

## 2021-01-01 RX ADMIN — FENTANYL CITRATE 25 MCG: 50 INJECTION, SOLUTION INTRAMUSCULAR; INTRAVENOUS at 04:47

## 2021-01-01 RX ADMIN — DEXTROSE MONOHYDRATE 50 ML/HR: 100 INJECTION, SOLUTION INTRAVENOUS at 08:42

## 2021-01-01 RX ADMIN — Medication 10 ML: at 21:33

## 2021-01-01 RX ADMIN — CHLORHEXIDINE GLUCONATE 15 ML: 0.12 RINSE ORAL at 08:02

## 2021-01-01 RX ADMIN — Medication 10 ML: at 21:49

## 2021-01-01 RX ADMIN — ONDANSETRON 4 MG: 2 INJECTION INTRAMUSCULAR; INTRAVENOUS at 20:32

## 2021-01-01 RX ADMIN — NITROGLYCERIN 1 INCH: 20 OINTMENT TOPICAL at 00:39

## 2021-01-01 RX ADMIN — PANTOPRAZOLE SODIUM 40 MG: 40 TABLET, DELAYED RELEASE ORAL at 06:33

## 2021-01-01 RX ADMIN — NOREPINEPHRINE BITARTRATE 55 MCG/MIN: 1 INJECTION, SOLUTION, CONCENTRATE INTRAVENOUS at 04:07

## 2021-01-01 RX ADMIN — FENTANYL CITRATE 25 MCG: 50 INJECTION, SOLUTION INTRAMUSCULAR; INTRAVENOUS at 15:56

## 2021-01-01 RX ADMIN — FENTANYL CITRATE 25 MCG: 50 INJECTION, SOLUTION INTRAMUSCULAR; INTRAVENOUS at 16:26

## 2021-01-01 RX ADMIN — ATORVASTATIN CALCIUM 10 MG: 20 TABLET, FILM COATED ORAL at 08:13

## 2021-01-01 RX ADMIN — APIXABAN 5 MG: 5 TABLET, FILM COATED ORAL at 09:51

## 2021-01-01 RX ADMIN — CASTOR OIL AND BALSAM, PERU: 788; 87 OINTMENT TOPICAL at 10:26

## 2021-01-01 RX ADMIN — CARVEDILOL 12.5 MG: 12.5 TABLET, FILM COATED ORAL at 11:27

## 2021-01-01 RX ADMIN — CASTOR OIL AND BALSAM, PERU: 788; 87 OINTMENT TOPICAL at 18:32

## 2021-01-01 RX ADMIN — BUMETANIDE 2 MG: 0.25 INJECTION, SOLUTION INTRAMUSCULAR; INTRAVENOUS at 08:33

## 2021-01-01 RX ADMIN — FENTANYL CITRATE 25 MCG: 50 INJECTION, SOLUTION INTRAMUSCULAR; INTRAVENOUS at 18:29

## 2021-01-01 RX ADMIN — CARVEDILOL 12.5 MG: 12.5 TABLET, FILM COATED ORAL at 18:06

## 2021-01-01 RX ADMIN — BUMETANIDE 1 MG: 1 TABLET ORAL at 08:20

## 2021-01-01 RX ADMIN — INSULIN LISPRO 3 UNITS: 100 INJECTION, SOLUTION INTRAVENOUS; SUBCUTANEOUS at 09:02

## 2021-01-01 RX ADMIN — DOBUTAMINE IN DEXTROSE 10 MCG/KG/MIN: 200 INJECTION, SOLUTION INTRAVENOUS at 08:44

## 2021-01-01 RX ADMIN — ONDANSETRON 4 MG: 2 INJECTION INTRAMUSCULAR; INTRAVENOUS at 23:44

## 2021-01-01 RX ADMIN — BUMETANIDE 1 MG: 1 TABLET ORAL at 17:34

## 2021-01-01 RX ADMIN — ASPIRIN 81 MG: 81 TABLET, COATED ORAL at 09:21

## 2021-01-01 RX ADMIN — DIPHENHYDRAMINE HYDROCHLORIDE 25 MG: 25 CAPSULE ORAL at 08:00

## 2021-01-01 RX ADMIN — INSULIN LISPRO 2 UNITS: 100 INJECTION, SOLUTION INTRAVENOUS; SUBCUTANEOUS at 13:01

## 2021-01-02 NOTE — PROGRESS NOTES
[address] [place] [address], [facility] [employer]  [phone] Subjective:  
  
Graham Lund is a 63 y.o. female with pmhx PAF/SSS s/p ablation and PM, HTN, HLD, DM with CKD IV, anemia, breast Ca s/p bilateral mastectomy, Morbid obesity and left BKA in 5/2020 who presents to clinic for hospital follow up. Admitted  at [facility] from 12/19-12/24/20 for acute respiratory failure s/t community-acquired pneumonia and acute on chronic heart failure. Treated with IV antibiotics and diuresed with Bumex IV. Had PARAG atop CKD IV, seen by Dr. Amisha Bledsoe. She was dc on Bumex 1 mg BID and carvedilol 12.5 mg BID. Followed up with PCP 12/29/2020 Today, she is feeling better. Has been compliant with her bumex. C/o diarrhea, she will call her PCP. Unchanged dependent swelling right leg. The patient denies chest pain/ shortness of breath, orthopnea, PND, palpitations, syncope, or presyncope. Patient Active Problem List  
 Diagnosis Date Noted  S/P atrioventricular rolando ablation 11/17/2020 Priority: 1 - One  Gastroesophageal reflux disease without esophagitis 12/29/2020  Acute respiratory failure ([address] 12/20/2020  UTI (urinary tract infection) 12/20/2020  Pneumonia 12/20/2020  
 SOB (shortness of breath) 12/20/2020  CKD (chronic kidney disease) 12/20/2020  Anticoagulated 12/20/2020  PARAG (acute kidney injury) ([address] 12/20/2020  Fluid overload 12/20/2020  Subtherapeutic international normalized ratio (INR) 12/20/2020  Suspected COVID-19 virus infection 12/20/2020  Chest pain 11/23/2020  Left below-knee amputee ([address] 06/11/2020  H/O bilateral mastectomy 03/26/2020  Foot deformity, right 09/24/2019  Pes cavus 09/24/2019  Diabetic ulcer of right midfoot associated with type 2 diabetes mellitus, with fat layer exposed ([address] 08/06/2019  Venous stasis ulcer of right lower leg with edema of right lower leg (HCC) 11/14/2018  Diabetic polyneuropathy associated with type 2 diabetes mellitus ([address] 11/13/2018  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, associated with type 2 diabetes mellitus ([address] 04/25/2018  Morbid obesity with BMI of 40.0-44.9, adult ([address] 05/01/2017  Controlled type 2 diabetes mellitus with stage 4 chronic kidney disease, with long-term current use of insulin ([address] 01/16/2017  PAF (paroxysmal atrial fibrillation) ([address] 11/28/2016  Anemia in stage 4 chronic kidney disease ([address] 11/28/2016  Essential hypertension 08/26/2016  Systolic murmur 66/00/1243  CKD (chronic kidney disease), stage IV ([address] 06/09/2012  Mixed hyperlipidemia 05/22/2012  Long term (current) use of anticoagulants 04/11/2012  S/P cardiac pacemaker procedure 04/03/2012  Sick sinus syndrome ([address] 04/02/2012  Status post ablation of atrial fibrillation 12/15/2011  Fe deficiency anemia 04/14/2010  
 History of breast cancer 04/13/2010  Asthma 04/13/2010 Anton Sebastian MD 
Past Medical History:  
Diagnosis Date  Acquired lymphedema Right arm  Acute respiratory failure ([address] 12/19/2020  Arrhythmia  Asthma  Atrial fibrillation ([address] Dr. Conrad Frankel and Dr. Gemma Johnson [facility])  Cancer ([address] bilateral breast  
 Chronic kidney disease  Diabetes mellitus type II   
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  
Procedure Laterality Date  HX AFIB ABLATION    
 HX AMPUTATION FOOT Left 04/2020  HX BREAST RECONSTRUCTION Bilateral   
 HX CARPAL TUNNEL RELEASE [address] [address] RIGHT MODIFIED RADICAL   
 HX MASTECTOMY Left   
 no lymphnode removal  
 HX MOHS PROCEDURES  1995  
 right  HX ORTHOPAEDIC Right   
 knee surgery  HX PACEMAKER  11/17/2020 Dr. Karissa Monique. Insertion of permanent pacemaker. AV node ablation  HX PACEMAKER    
 IR INSERT TUNL CVC W PORT OVER 5 YEARS    
 IR INSERT TUNL CVC W/O PORT OVER 5 YR  5/4/2020  IR INSERT TUNL CVC W/O PORT OVER 5 YR  9/8/2020  IR REMOVE TUNL CVAD W/O PORT / PUMP  6/26/2020  IR REMOVE TUNL CVAD W/O PORT / PUMP  10/19/2020  MO ABDOMEN SURGERY PROC UNLISTED    
 gastric bypass [address] AND 1994  MO GASTRIC BYPASS,[id number] [id number]  7/08  
 [facility]  MO ICAR CATHETER ABLATION ATRIOVENTR NODE FUNCTION N/A 11/17/2020 ABLATION AV NODE performed by Ana Maria Gomez MD at [address] CATH LAB [address]  MO NEEDLE BIOPSY LIVER,W OTHR PROC  8.21.07  
 MO RELOCATION OF SKIN POCKET FOR PACEMAKER N/A 4/24/2020 RELOCATE 2 [facility] performed by Tu Jimenez MD at [address] CATH LAB  MO RMVL TRANSVNS PM ELTRD 1 LEAD SYS ATR/VENTR N/A 4/24/2020 Remove Pacemaker Dual Leads performed by Tu Jimenez MD at [facility] CARD[facility] CATH LAB  MO RMVL TRANSVNS PM ELTRD 1 LEAD SYS ATR/VENTR N/A 4/27/2020 REMOVE PACEMAKER DUAL LEADS performed by Tu Jimenez MD at [address] CATH LAB  MO TCAT INSJ/RPL PERM LEADLESS PACEMAKER RV W/IMG N/A 11/17/2020 INSERT OR REPLACE TRANSCATH PPM LEADLESS performed by Ana Maria Gomez MD at [address] CATH LAB  MO TRABECULOPLASTY BY LASER SURGERY    
 US GUIDE ASP OVARIAN CYST ABD APPROACH  8.21.07  
 RIGHT Allergies Allergen Reactions  Avapro [Irbesartan] Unable to Obtain  Bactrim [Sulfamethoxazole-Trimethoprim] Other (comments) Sore mouth  Contrast Agent [Iodine] Itching [address]  Morphine Nausea and Vomiting and Swelling  Penicillins Hives Did not tolerate cefepime 3/2020  Pravachol [Pravastatin] Nausea Only  Seafood [Shellfish Containing Products] Hives  Singulair [Montelukast] Other (comments)  
  palpitations  Ace Inhibitors Cough  Amlodipine Swelling  Captopril Cough  Carvedilol Diarrhea Family History Problem Relation Age of Onset  Cancer Mother  Heart Disease Mother  Alcohol abuse Father  Diabetes Brother  Hypertension Brother Social History Socioeconomic History  Marital status:  Spouse name: Not on file  Number of children: Not on file  Years of education: Not on file  Highest education level: Not on file Occupational History  Not on file Social Needs  Financial resource strain: Not on file  Food insecurity Worry: Not on file Inability: Not on file  Transportation needs Medical: Not on file Non-medical: Not on file Tobacco Use  Smoking status: Never Smoker  Smokeless tobacco: Never Used Substance and Sexual Activity  Alcohol use: Not Currently  Drug use: No  
 Sexual activity: Not on file Lifestyle  Physical activity Days per week: Not on file Minutes per session: Not on file  Stress: Not on file Relationships  Social connections Talks on phone: Not on file Gets together: Not on file Attends Pent[employer]ostalism service: Not on file Active member of club or organization: Not on file Attends meetings of clubs or organizations: Not on file Relationship status: Not on file  Intimate partner violence Fear of current or ex partner: Not on file Emotionally abused: Not on file Physically abused: Not on file   Forced sexual activity: Not on file Other Topics Concern  Not on file Social History Narrative  Not on file Current Outpatient Medications Medication Sig  warfarin (Coumadin) 1 mg tablet Take 2 mg on Mon, Wed, and Fri and 1 mg all other days.  sucralfate (CARAFATE) 1 gram tablet Take 1 Tab by mouth four (4) times daily for 30 days.  carvediloL (COREG) 12.5 mg tablet Take 1 Tab by mouth two (2) times daily (with meals) for 30 days.  bumetanide (BUMEX) 1 mg tablet Take 1 Tab by mouth two (2) times a day for 30 days.  pantoprazole (PROTONIX) 40 mg tablet Take 1 Tab by mouth Before breakfast and dinner.  sertraline (ZOLOFT) 100 mg tablet  sertraline (ZOLOFT) 25 mg tablet Take 25 mg by mouth daily. Take with 100 mg tablet every morning  hydrALAZINE (APRESOLINE) 100 mg tablet Take 1 Tab by mouth three (3) times daily.  busPIRone (BUSPAR) 10 mg tablet TAKE ONE TABLET BY MOUTH TWICE A DAY  epoetin viet-epbx (RETACRIT) 10,000 unit/mL injection 2 mL by SubCUTAneous route Every Tues, Thur & Sat for 30 days. Indications: anemia due to kidney failure No current facility-administered medications for this visit. Review of Symptoms: 
11 systems reviewed, negative other than as stated in the HPI Physical ExamPhysical Exam:   
Vitals:  
 01/04/21 1357 BP: 116/60 Pulse: 91  
Resp: 20 SpO2: 97% Weight: 236 lb 8 oz (107.3 kg) Height: 5' 10\" (1.778 m) Body mass index is 33.93 kg/m². General PE Gen:  NAD Mental Status - Alert. General Appearance - Not in acute distress. HEENT: 
PERRL, no carotid bruits or JVD Chest and Lung Exam  
Inspection: Accessory muscles - No use of accessory muscles in breathing. Auscultation:  
Breath sounds: - Normal.  
Cardiovascular Inspection: Jugular vein - Bilateral - Inspection Normal.  
Palpation/Percussion:  
Apical Impulse: - Normal.  
Auscultation: Rhythm - Regular. Heart Sounds - S1 WNL and S2 WNL. No S3 or S4.   
Murmurs & Other Heart Sounds: Auscultation of the heart reveals - No Murmurs. Peripheral Vascular Upper Extremity: Inspection - Bilateral - No Cyanotic nailbeds or Digital clubbing. Lower Extremity:  
Palpation: Edema - Bilateral - No edema. Abdomen:   Soft, non-tender, bowel sounds are active. Neuro: A&O times 3, CN and motor grossly WNL Labs:  
Lab Results Component Value Date/Time Cholesterol, total 73 (L) 12/30/2020 01:33 PM  
 Cholesterol, total 141 11/11/2019 08:47 AM  
 Cholesterol, total 107 11/12/2018 10:28 AM  
 Cholesterol, total 158 08/16/2017 08:16 AM  
 Cholesterol, total 151 08/26/2016 11:26 AM  
 HDL Cholesterol 36 (L) 12/30/2020 01:33 PM  
 HDL Cholesterol 32 (L) 11/11/2019 08:47 AM  
 HDL Cholesterol 27 (L) 11/12/2018 10:28 AM  
 HDL Cholesterol 29 (L) 08/16/2017 08:16 AM  
 HDL Cholesterol 31 (L) 08/26/2016 11:26 AM  
 LDL, calculated 21 12/30/2020 01:33 PM  
 LDL, calculated 82 11/11/2019 08:47 AM  
 LDL, calculated 54 11/12/2018 10:28 AM  
 LDL, calculated 85 08/16/2017 08:16 AM  
 LDL, calculated 84 08/26/2016 11:26 AM  
 LDL, calculated 79 05/12/2015 10:44 AM  
 Triglyceride 77 12/30/2020 01:33 PM  
 Triglyceride 137 11/11/2019 08:47 AM  
 Triglyceride 129 11/12/2018 10:28 AM  
 Triglyceride 221 (H) 08/16/2017 08:16 AM  
 Triglyceride 178 (H) 08/26/2016 11:26 AM  
 
Lab Results Component Value Date/Time CK 4,609 (H) 05/14/2020 05:15 AM  
 
Lab Results Component Value Date/Time Sodium 142 12/30/2020 01:33 PM  
 Potassium 4.6 12/30/2020 01:33 PM  
 Chloride 103 12/30/2020 01:33 PM  
 CO2 22 12/30/2020 01:33 PM  
 Anion gap 5 12/24/2020 05:37 AM  
 Glucose 121 (H) 12/30/2020 01:33 PM  
 BUN 23 12/30/2020 01:33 PM  
 Creatinine 2.06 (H) 12/30/2020 01:33 PM  
 BUN/Creatinine ratio 11 (L) 12/30/2020 01:33 PM  
 GFR est AA 29 (L) 12/30/2020 01:33 PM  
 GFR est non-AA 25 (L) 12/30/2020 01:33 PM  
 Calcium 9.4 12/30/2020 01:33 PM  
 Bilirubin, total 1.0 12/20/2020 01:10 PM  
 Alk.  phosphatase 110 12/20/2020 01:10 PM  
 Protein, total 7.0 12/20/2020 01:10 PM  
 Albumin 2.6 (L) 12/24/2020 05:37 AM  
 Globulin 4.0 12/20/2020 01:10 PM  
 A-G Ratio 0.8 (L) 12/20/2020 01:10 PM  
 ALT (SGPT) 10 (L) 12/20/2020 01:10 PM  
 
 
EKG: 
 
 
 Assessment: 
 
 Assessment: 1. Essential hypertension 2. PAF (paroxysmal atrial fibrillation) ([facility] 3. Controlled type 2 diabetes mellitus with stage 4 chronic kidney disease, unspecified whether long term insulin use ([facility] 4. Mixed hyperlipidemia 5. CKD (chronic kidney disease), stage IV ([facility] 6. S/P cardiac pacemaker procedure 7. Sick sinus syndrome ([facility] Orders Placed This Encounter  AMB POC EKG ROUTINE W/ 12 LEADS, INTER & REP Order Specific Question:   Reason for Exam: Answer:   routine Plan: HFpEF Echo November 27, 2020 with LVEF 50 to 55%, no significant valvular pathology Stable Continue Bumex 1 mg BID Cr 2.06 on 12/31/2020 She will monitor weight closely and let us and Dr. Mert Scott know if weight is increasing. I will likely defer diuretic adjustments to Dr. Mert Scott due to her renal dysfunction and lab monitoring by him. PAF/SSS s/p ablation and leadless PM insertion 11/17/2020 performed by Dr Priya Menjivar Remains in [place] On coumadin for INR 2-3 that is followed by PCP Device will now be followed by Dr Gurjit Oakes 
Recall: History of osteomyelitis/endocarditis requiring pacemaker removal and placement of leadless pacemaker HTN Controlled with current therapy HLD 
12/2020 LDL 21 Labs and lipids per PCP CKD IV  
Cr 2.06 on 12/31/2020 Followed by Dr Dori Baptiste Iron Def anemia / Anemia in the setting of chronic disease Hgb 8.3 in 12/31/2020 On procrit per renal 
She is waiting for insurance approval for procrit Hx  left BKA in 5/2020 Continue current care and f/u in 6 mos Kalpesh Coy MD

## 2021-01-04 NOTE — LETTER
1/4/2021 Patient: Leni Flores YOB: 1960 Date of Visit: 1/4/2021 Al Bundy MD 
[address] [address] [address] [employer] Dear Al Bundy MD, Thank you for referring Ms. Ping Blankenship to [address] [place] [address] for evaluation. My notes for this consultation are attached. If you have questions, please do not hesitate to call me. I look forward to following your patient along with you.  
 
 
Sincerely, 
 
Manuel Hays MD

## 2021-01-04 NOTE — PROGRESS NOTES
Chief Complaint Patient presents with  
[facility] Follow Up  
  ED [facility] 12/19-12/24/20 for ARF/CHF - here for follow up -   
 Irregular Heart Beat Feels flutters at times  Foot Swelling  
  right foot 1. Have you been to the ER, urgent care clinic since your last visit? Hospitalized since your last visit? Yes see above and [facility] in 11/2020 for PMI - Dr Radha Correa 2. Have you seen or consulted any other health care providers outside of the [address] [place] [address] since your last visit? Include any pap smears or colon screening.  No

## 2021-01-05 NOTE — PROGRESS NOTES
Your labs done last week showed anemia with hemoglobin at 8.3. This is a little lower than it was better you left the hospital on 12/24/20. Be sure to keep your upcoming appointment with Dr. Nisreen Edgar. Your kidney tests showed chronic kidney disease stage 4 that was a little better than it was when you left the hospital.  Your urine test showed protein, which is a sign of chronic kidney disease. Your cholesterol level was very good at 73, and your magnesium level was a little high. Last of all, your INR was too low at 1.9. Change warfarin to take 2 mg on Mon, Wed, Fri, and Sat with 1 mg on the other days. Schedule an appointment for a nursing visit for INR check in clinic in 2 weeks.

## 2021-01-07 NOTE — TELEPHONE ENCOUNTER
----- Message from Júnior Guerrero MD sent at 1/5/2021  3:11 PM EST -----  Your labs done last week showed anemia with hemoglobin at 8.3. This is a little lower than it was better you left the hospital on 12/24/20. Be sure to keep your upcoming appointment with Dr. Phani Roach. Your kidney tests showed chronic kidney disease stage 4 that was a little better than it was when you left the hospital.  Your urine test showed protein, which is a sign of chronic kidney disease. Your cholesterol level was very good at 73, and your magnesium level was a little high. Last of all, your INR was too low at 1.9. Change warfarin to take 2 mg on Mon, Wed, Fri, and Sat with 1 mg on the other days. Schedule an appointment for a nursing visit for INR check in clinic in 2 weeks.

## 2021-01-07 NOTE — PROGRESS NOTES
Verified two patient identifiers, name and . Pt notified of results, will make the change in coumadin dose. Pt wants to do to [facility] for next INR. Has not made an appointment with Dr Dedra Schilling yet, encouraged to do so. Pt had no further questions at this time.

## 2021-01-07 NOTE — TELEPHONE ENCOUNTER
Verified two patient identifiers, name and . Pt notified of results, will make the change in coumadin dose. Pt wants to do to [facility] for next INR. Has not made an appointment with Dr Sarwat Bosch yet, encouraged to do so. Pt had no further questions at this time. Lab order and change in coumadin directions sent to Dr Prasad Angry to sign.

## 2021-01-11 NOTE — PROGRESS NOTES
Patient resolved from Transition of Care episode on 1/11/2021.   ACM/CTN was unsuccessful at contacting this patient today. 
Patient has not had any additional ED or hospital visits.  
 
No further outreach scheduled with this CTN/ACM.  Episode of Care resolved.  Patient has this CTN/ACM contact information if future needs arise.

## 2021-01-26 NOTE — PROGRESS NOTES
Message sent to patient by My Chart: We can recheck you INR at your clinic visit on 2/8/21.     Dr. Robb Cheung

## 2021-01-26 NOTE — PROGRESS NOTES
Message sent to patient by My Chart:  Your INR was low at 1.2, lower than it was on 12/30/20. Your goal INR is 2.0-3.0. Increase warfarin 1 mg to 2 tablets daily and have your INR rechecked in 2-4 weeks. Let me know if you have any questions.     Dr. Era Gage

## 2021-02-08 PROBLEM — R60.9 VENOUS STASIS ULCER OF RIGHT LOWER LEG WITH EDEMA OF RIGHT LOWER LEG (HCC): Status: RESOLVED | Noted: 2018-11-14 | Resolved: 2021-01-01

## 2021-02-08 PROBLEM — E11.3522: Status: RESOLVED | Noted: 2018-04-25 | Resolved: 2021-01-01

## 2021-02-08 PROBLEM — E11.621 DIABETIC ULCER OF RIGHT MIDFOOT ASSOCIATED WITH TYPE 2 DIABETES MELLITUS, WITH FAT LAYER EXPOSED (HCC): Status: RESOLVED | Noted: 2019-08-06 | Resolved: 2021-01-01

## 2021-02-08 PROBLEM — L97.919 VENOUS STASIS ULCER OF RIGHT LOWER LEG WITH EDEMA OF RIGHT LOWER LEG (HCC): Status: RESOLVED | Noted: 2018-11-14 | Resolved: 2021-01-01

## 2021-02-08 PROBLEM — L97.412 DIABETIC ULCER OF RIGHT MIDFOOT ASSOCIATED WITH TYPE 2 DIABETES MELLITUS, WITH FAT LAYER EXPOSED (HCC): Status: RESOLVED | Noted: 2019-08-06 | Resolved: 2021-01-01

## 2021-02-08 PROBLEM — I83.019 VENOUS STASIS ULCER OF RIGHT LOWER LEG WITH EDEMA OF RIGHT LOWER LEG (HCC): Status: RESOLVED | Noted: 2018-11-14 | Resolved: 2021-01-01

## 2021-02-08 PROBLEM — I83.891 VENOUS STASIS ULCER OF RIGHT LOWER LEG WITH EDEMA OF RIGHT LOWER LEG (HCC): Status: RESOLVED | Noted: 2018-11-14 | Resolved: 2021-01-01

## 2021-02-08 NOTE — PATIENT INSTRUCTIONS
Ã¯Â»Â Anticoagulants: After Your Visit Your Care Instructions Your doctor prescribed an anticoagulant medicine. Anticoagulants, often called blood thinners, prevent new blood clots from forming and keep existing clots from getting larger. They do not actually thin the blood, but they make the blood take longer to clot. This lowers the risk of a blood clot moving to the lungs (pulmonary embolism) or moving to the brain and causing a stroke. Blood thinners come in two forms. Heparin is given by shot, either under your skin or through a needle in your vein, and starts working right away. Warfarin (Coumadin) comes in pill form and takes longer to work. Your doctor may have you begin taking both forms at the same time. As soon as the pills start to work, you will stop the shots but continue to take the pills. If you have a blood clot in your leg, you may need to take warfarin for several months. People who have heart conditions such as atrial fibrillation often need to take it for the rest of their lives. The right dose of this medicine is different for each person. You will need regular blood tests to see if your dose is correct. Follow-up care is a key part of your treatment and safety. Be sure to make and go to all appointments, and call your doctor if you are having problems. Itâs also a good idea to know your test results and keep a list of the medicines you take. How do you take blood thinners? · Take your medicines exactly as prescribed. Call your doctor if you think you are having a problem with your medicine. · Call your doctor if you are not sure what to do if you missed a dose of blood thinner. ¨ Your doctor can tell you exactly what to do so you do not take too much or too little blood thinner. Then you will be as safe as possible. · Some general rules for what to do if you miss a dose: ¨ If you remember it in the same day, take the missed dose. Then go back to your regular schedule. ¨ If it is the next day, or almost time to take the next dose, do not take the missed dose. Do not double the dose to make up for the missed one. At your next regularly scheduled time, take your normal dose. ¨ If you miss your dose for 2 or more days, call your doctor. · To help you stay on schedule, use a calendar to remind you when to take your blood thinner. When you take the medicine, note it on the calendar. · If you are going to give yourself shots, your doctor will give you instructions for how to safely inject the medicine. Follow the directions carefully. · Do not take any vitamins, over-the-counter medicines, or herbal products without talking to your doctor first. 
· Avoid contact sports and other activities that could lead to injury. Make your home safe and take other measures to reduce your risk of falling. Always wear a seat belt while in a car. · Do not suddenly change your intake of vitamin Kârich foods, such as broccoli, cabbage, asparagus, lettuce, and spinach. This will help blood thinners work evenly from day to day. · Limit alcohol to 2 drinks a day for men and 1 drink a day for women. Alcohol may interfere with blood thinners. It also increases your risk of falls, which can cause bruising and bleeding. · Tell your dentist, pharmacist, and other health professionals that you take blood thinners. Wear medical alert jewelry that says you take blood thinners. You can buy this at most drugstores. When should you call for help? Call 911 anytime you think you may need emergency care. For example, call if: 
· You cough up blood. · You vomit blood or what looks like coffee grounds. · You pass maroon or very bloody stools. Call your doctor now or seek immediate medical care if: 
· You have new bruises or blood spots under your skin. · You have a nosebleed. · Your gums bleed when you brush your teeth. · You have blood in your urine. · Your stools are black and tarlike or have streaks of blood. · You have vaginal bleeding when you are not having your period, or heavy period bleeding. Watch closely for changes in your health, and be sure to contact your doctor if: 
· You have questions about your medicine. Where can you learn more? Go to [employer] Enter L150 in the search box to learn more about \"Anticoagulants: After Your Visit. \" Â© 9872-4711 [employer]wise, Incorporated. Care instructions adapted under license by [facility] (which disclaims liability or warranty for this information). This care instruction is for use with your licensed healthcare professional. If you have questions about a medical condition or this instruction, always ask your healthcare professional. [address] any warranty or liability for your use of this information. Content Version: 2.9.77093; Last Revised: July 1, 2009

## 2021-02-08 NOTE — PROGRESS NOTES
Identified pt with two pt identifiers. Reviewed record in preparation for visit and have obtained necessary documentation. All patient medications has been reviewed. Chief Complaint Patient presents with  Diabetes Additional information about chief complaint: 
 
Visit Vitals /78 (BP 1 Location: Left upper arm, BP Patient Position: Sitting, BP Cuff Size: Large adult) Pulse 90 Temp 98 °F (36.7 °C) (Oral) Resp 16 Ht 5' 10\" (1.778 m) Wt 264 lb (119.7 kg) SpO2 97% BMI 37.88 kg/m² Health Maintenance Due Topic  COVID-19 Vaccine (1 of 2)  Shingrix Vaccine Age 50> (1 of 2)  Colorectal Cancer Screening Combo  Foot Exam Q1 1. Have you been to the ER, urgent care clinic since your last visit? Hospitalized since your last visit? Yes 12/19/20 
 
2. Have you seen or consulted any other health care providers outside of the [address] [place] [address] since your last visit? Include any pap smears or colon screening.  
NO  
bdg

## 2021-02-08 NOTE — PROGRESS NOTES
CC:  
Chief Complaint Patient presents with  Diabetes HISTORY OF PRESENT ILLNESS Igor Abel is a 63 y.o. female. Presents for 1 month follow up evaluation. She has type 2 DM with CKD stage 4 and polyneuropathy, HTN, hyperlipidemia, paroxysmal atrial fibrillation on chronic warfarin, hx of ablation of a-fib, cardiac pacemaker for SSS, anemia of chronic disease, iron def anemia, history of osteomyelitis and endocarditis requiring pacemaker removal and placement of leadless pacemaker on 11/17/20 by Dr. Pablito Isaacs, hx of breast cancer s/p bilateral mastectomy, left BKA 5/2020, and morbid obesity. Hospitalized at [facility] from 12/19-12/24/20 for community acquired pneumonia and acute on chronic heart failure. Still with SOB. Also has cough productive of gray sputum. Denies fevers or chills. Taking Mucinex and Robitussin. Reports she had 2 iron infusions since last clinic visit. Starts Retacrit this week. Saw Dr. Nae Rodrigues on 1/4/21. Continued on Bumex 1 mg NID. She has appointment with Dr. Pablito Isaacs on 2/24/21. Denies polydipsia, polyuria, or hypoglycemia. Diet-controlled; on no DM medication. Last A1c 6.6% on 12/19/20. Patient Active Problem List  
Diagnosis Code  History of breast cancer Z85.3  Asthma J45.909  Fe deficiency anemia D50.9  Status post ablation of atrial fibrillation Z98.890, Z86.79  
 Sick sinus syndrome (HCC) I49.5  S/P cardiac pacemaker procedure Z95.0  Long term (current) use of anticoagulants Z79.01  
 Mixed hyperlipidemia E78.2  CKD (chronic kidney disease), stage IV (HCC) N18.4  Systolic murmur O63.8  Essential hypertension I10  
 PAF (paroxysmal atrial fibrillation) ([facility]) I48.0  Anemia in stage 4 chronic kidney disease (HCC) N18.4, D63.1  Controlled type 2 diabetes mellitus with stage 4 chronic kidney disease, with long-term current use of insulin (HCC) E11.22, N18.4, Z79.4  Morbid obesity with BMI of 40.0-44.9, adult ([facility]) E66.01, Z68.41  
 Diabetic polyneuropathy associated with type 2 diabetes mellitus ([address] E11.42  Venous stasis ulcer of right lower leg with edema of right lower leg ([facility]) I83.019, I83.891, L97.919, R60.9  Diabetic ulcer of right midfoot associated with type 2 diabetes mellitus, with fat layer exposed ([address] E11.621, R79.539  Foot deformity, right M21.961  Pes cavus Q66.70  
 H/O bilateral mastectomy Z90.13  Left below-knee amputee [facility]) Y59.033  S/P atrioventricular rolando ablation Z98.890  Chest pain R07.9  Acute respiratory failure ([facility]) J96.00  
 UTI (urinary tract infection) N39.0  Pneumonia J18.9  
 SOB (shortness of breath) R06.02  
 CKD (chronic kidney disease) N18.9  Anticoagulated Z79.01  
 PARAG (acute kidney injury) ([address] N17.9  Fluid overload E87.70  
 Subtherapeutic international normalized ratio (INR) R79.1  Suspected COVID-19 virus infection Z20.822  Gastroesophageal reflux disease without esophagitis K21.9 Past Medical History:  
Diagnosis Date  Acquired lymphedema Right arm  Acute respiratory failure ([address] 12/19/2020  Arrhythmia  Asthma  Atrial fibrillation ([facility]  Cancer ([address] bilateral breast  
 Chronic kidney disease  Diabetes mellitus type II   
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 ([address] 3/3/2020  Dizziness  Essential hypertension  Hyperlipidemia  Hypertension  Long term (current) use of anticoagulants  Morbid obesity ([address] 3/14/2012  Nausea & vomiting  Neuropathy  Osteoarthritis  Pacemaker  Sick sinus syndrome ([address]  Type 2 diabetes mellitus with left diabetic foot infection ([address] 10/29/2019 Allergies Allergen Reactions  Avapro [Irbesartan] Unable to Obtain  Bactrim [Sulfamethoxazole-Trimethoprim] Other (comments) Sore mouth  Contrast Agent [Iodine] Itching [address]  Morphine Nausea and Vomiting and Swelling  Penicillins Hives Did not tolerate cefepime 3/2020  Pravachol [Pravastatin] Nausea Only  Seafood [Shellfish Containing Products] Hives  Singulair [Montelukast] Other (comments)  
  palpitations  Ace Inhibitors Cough  Amlodipine Swelling  Captopril Cough  Carvedilol Diarrhea Current Outpatient Medications Medication Sig Dispense Refill  warfarin (Coumadin) 1 mg tablet Take 2 mg on Mon, Wed, Fri, Sat and 1 mg all other days. 45 Tab 2  
 pantoprazole (PROTONIX) 40 mg tablet Take 1 Tab by mouth Before breakfast and dinner. 60 Tab 1  
 sertraline (ZOLOFT) 100 mg tablet  sertraline (ZOLOFT) 25 mg tablet Take 25 mg by mouth daily. Take with 100 mg tablet every morning  hydrALAZINE (APRESOLINE) 100 mg tablet Take 1 Tab by mouth three (3) times daily. 90 Tab 11  
 busPIRone (BUSPAR) 10 mg tablet TAKE ONE TABLET BY MOUTH TWICE A DAY 60 Tab 10 PHYSICAL EXAM 
Visit Vitals /78 (BP 1 Location: Left upper arm, BP Patient Position: Sitting, BP Cuff Size: Large adult) Pulse 90 Temp 98 °F (36.7 °C) (Oral) Resp 16 Ht 5' 10\" (1.778 m) Wt 264 lb (119.7 kg) SpO2 97% BMI 37.88 kg/m² General: Well-developed and well-nourished, no distress. Pale. HEENT:  Head normocephalic/atraumatic, no scleral icterus Lungs:  Clear to ausculation bilaterally. Good air movement. Heart:  Regular rate and rhythm, normal S1 and S2, no murmur, gallop, or rub Extremities: No clubbing, cyanosis, or RLE edema. L BKA. Neurological: Alert and oriented. Psychiatric: Normal mood and affect. Behavior is normal.  
 
Results for orders placed or performed in visit on 02/08/21 AMB POC PT/INR Result Value Ref Range VALID INTERNAL CONTROL POC Yes Prothrombin time (POC) 15.1 seconds INR POC 1.3 ASSESSMENT AND PLAN 
  ICD-10-CM ICD-9-CM 1. Essential hypertension  I10 401.9 2. Chronic anticoagulation  Z79.01 V58.61 AMB POC PT/INR  
   warfarin (Coumadin) 1 mg tablet 3. PAF (paroxysmal atrial fibrillation) ([facility])  I48.0 427.31 warfarin (Coumadin) 1 mg tablet 4. Acute bronchitis, unspecified organism  J20.9 466.0 azithromycin (Zithromax Z-Rick) 250 mg tablet 5. Chronic systolic congestive heart failure ([facility])  I50.22 428.22   
  428.0 6. Anemia in stage 4 chronic kidney disease ([facility])  N18.4 285.21   
 D63.1 585.4 7. Left below-knee amputee [facility])  Z89.512 V49.75 Diagnoses and all orders for this visit: 1. Essential hypertension Controlled. Continue present management. 2. Chronic anticoagulation INR subtherapeutic at 1.3. 
-     AMB POC PT/INR 
-     Increase to: warfarin (Coumadin) 1 mg tablet; Take 2 mg by mouth daily. 3. PAF (paroxysmal atrial fibrillation) ([facility] -     warfarin (Coumadin) 1 mg tablet; Take 2 mg by mouth daily. 4. Acute bronchitis, unspecified organism 
-     azithromycin (Zithromax Z-Rick) 250 mg tablet; Take 2 tablets on day 1 then 1 tablet daily on days 2-5 
 
5. Chronic systolic congestive heart failure ([facility] Continue Bumex. 6. Anemia in stage 4 chronic kidney disease ([facility] Also has Fe deficiency anemia. Start epo (Retacrit) injections. Follow up with Dr. Andres Hernandes. 7. Left below-knee amputee ([facility] Follow-up and Dispositions · Return in about 2 months (around 4/8/2021), or if symptoms worsen or fail to improve, for HTN, DM; schedule nursing visit for INR check in 2 weeks. I have discussed the diagnosis with the patient and the intended plan as seen in the above orders. Patient is in agreement. The patient has received an after-visit summary and questions were answered concerning future plans. I have discussed medication side effects and warnings with the patient as well.

## 2021-02-28 NOTE — ED PROVIDER NOTES
EMERGENCY DEPARTMENT HISTORY AND PHYSICAL EXAM 
 
 
Date: 2/28/2021 Patient Name: Ester Weiner History of Presenting Illness Chief Complaint Patient presents with  Shortness of Breath Into triage via wheelchair wiht c/o increased SOB and abdominal swelling x yesterday. History CHF. No obvious distress noted. History Provided By: Patient HPI: Ester Weiner, 63 y.o. female with a past medical history significant for CHF, lymphedema, diabetes, BKA, multiple medical problems as stated below presents to the ED with cc of moderate to severe shortness of breath with exertion, PND, and lower extremity edema over the last 2 to 3 days. Patient reports she is recently change the dose of her Bumex from 1 mg twice daily to the 2 mg in the morning. She reports that they have been adjusting her doses of her diuretics recently as he has been much worse. She has noticed increasing swelling in her right lower extremity. She denies any fevers or chills. No productive cough. No other associated symptoms. No other exacerbating ameliorating factors. There are no other complaints, changes, or physical findings at this time. PCP: Monico Goldmann, MD 
 
No current facility-administered medications on file prior to encounter. Current Outpatient Medications on File Prior to Encounter Medication Sig Dispense Refill  warfarin (Coumadin) 1 mg tablet Take 2 mg by mouth daily. 45 Tab 2  
 [DISCONTINUED] azithromycin (Zithromax Z-Rick) 250 mg tablet Take 2 tablets on day 1 then 1 tablet daily on days 2-5 6 Tab 0  
 pantoprazole (PROTONIX) 40 mg tablet Take 1 Tab by mouth Before breakfast and dinner. 60 Tab 1  
 sertraline (ZOLOFT) 100 mg tablet  sertraline (ZOLOFT) 25 mg tablet Take 25 mg by mouth daily. Take with 100 mg tablet every morning  hydrALAZINE (APRESOLINE) 100 mg tablet Take 1 Tab by mouth three (3) times daily.  90 Tab 11  
 busPIRone (BUSPAR) 10 mg tablet TAKE ONE TABLET BY MOUTH TWICE A DAY 60 Tab 10 Past History Past Medical History: 
Past Medical History:  
Diagnosis Date  Acquired lymphedema Right arm  Acute respiratory failure ([address] 12/19/2020  Arrhythmia  Asthma  Atrial fibrillation ([address] Dr. Ravi Ny and Dr. Frances [facility])  Cancer ([address] bilateral breast  
 Chronic kidney disease  Diabetes mellitus type II   
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 
Past Surgical History:  
Procedure Laterality Date  HX AFIB ABLATION    
 HX AMPUTATION FOOT Left 04/2020  HX BREAST RECONSTRUCTION Bilateral   
 HX CARPAL TUNNEL RELEASE [address] [facility] [address] RIGHT MODIFIED RADICAL   
 HX MASTECTOMY Left   
 no lymphnode removal  
 HX MOHS PROCEDURES  1995  
 right  HX ORTHOPAEDIC Right   
 knee surgery  HX PACEMAKER  11/17/2020 Dr. Helen Mccall. Insertion of permanent pacemaker. AV node ablation  HX PACEMAKER    
 IR INSERT TUNL CVC W PORT OVER 5 YEARS    
 IR INSERT TUNL CVC W/O PORT OVER 5 YR  5/4/2020  IR INSERT TUNL CVC W/O PORT OVER 5 YR  9/8/2020  IR REMOVE TUNL CVAD W/O PORT / PUMP  6/26/2020  IR REMOVE TUNL CVAD W/O PORT / PUMP  10/19/2020  WI ABDOMEN SURGERY PROC UNLISTED    
 gastric bypass [address] AND 1994  WI GASTRIC BYPASS,OBESE<150CM [id number]  7/08  
 [place]  WI ICAR CATHETER ABLATION ATRIOVENTR NODE FUNCTION N/A 11/17/2020 ABLATION AV NODE performed by Jocelin Ibarra MD at [address] CATH LAB [address]  CT NEEDLE BIOPSY LIVER,W OTHR PROC  8.21.07  
 CT RELOCATION OF SKIN POCKET FOR PACEMAKER N/A 4/24/2020 RELOCATE [address] performed by Ana Cristina Leon MD at [address] CATH LAB  CT RMVL TRANSVNS PM ELTRD 1 LEAD SYS ATR/VENTR N/A 4/24/2020 Remove Pacemaker Dual Leads performed by Ana Cristina Leon MD at [facility] CATH LAB  CT RMVL TRANSVNS PM ELTRD 1 LEAD SYS ATR/VENTR N/A 4/27/2020 REMOVE PACEMAKER DUAL LEADS performed by Ana Cristina Leon MD at [address] CATH LAB  CT TCAT INSJ/RPL PERM LEADLESS PACEMAKER RV W/IMG N/A 11/17/2020 INSERT OR REPLACE TRANSCATH PPM LEADLESS performed by Jocelin Ibarra MD at [address] CATH LAB  CT TRABECULOPLASTY BY LASER SURGERY    
 US GUIDE ASP OVARIAN CYST ABD APPROACH  8.21.07  
 RIGHT Family History: 
Family History Problem Relation Age of Onset  Cancer Mother  Heart Disease Mother  Alcohol abuse Father  Diabetes Brother  Hypertension Brother Social History: 
Social History Tobacco Use  Smoking status: Never Smoker  Smokeless tobacco: Never Used Substance Use Topics  Alcohol use: Not Currently  Drug use: No  
 
 
Allergies: Allergies Allergen Reactions  Avapro [Irbesartan] Unable to Obtain  Bactrim [Sulfamethoxazole-Trimethoprim] Other (comments) Sore mouth  Contrast Agent [Iodine] Itching [address]  Morphine Nausea and Vomiting and Swelling  Penicillins Hives Did not tolerate cefepime 3/2020  Pravachol [Pravastatin] Nausea Only  Seafood [Shellfish Containing Products] Hives  Singulair [Montelukast] Other (comments)  
  palpitations  Ace Inhibitors Cough  Amlodipine Swelling  Captopril Cough  Carvedilol Diarrhea Review of Systems Review of Systems Constitutional: Negative for chills, diaphoresis, fatigue and fever. HENT: Negative for ear pain and sore throat. Eyes: Negative for pain and redness. Respiratory: Positive for shortness of breath. Negative for cough. Cardiovascular: Negative for chest pain and leg swelling. Gastrointestinal: Negative for abdominal pain, diarrhea, nausea and vomiting. Endocrine: Negative for cold intolerance and heat intolerance. Genitourinary: Negative for flank pain and hematuria. Musculoskeletal: Negative for back pain and neck stiffness. Skin: Negative for rash and wound. Neurological: Negative for dizziness, syncope and headaches. All other systems reviewed and are negative. Physical Exam  
Physical Exam 
Vitals signs and nursing note reviewed. Constitutional:   
   General: She is in acute distress. Appearance: She is well-developed. She is not ill-appearing. HENT:  
   Head: Normocephalic and atraumatic. Mouth/Throat:  
   Pharynx: No oropharyngeal exudate. Eyes:  
   Conjunctiva/sclera: Conjunctivae normal.  
   Pupils: Pupils are equal, round, and reactive to light. Neck: Musculoskeletal: Normal range of motion. Cardiovascular:  
   Rate and Rhythm: Normal rate and regular rhythm. Heart sounds: No murmur. Pulmonary:  
   Effort: Tachypnea and accessory muscle usage present. No respiratory distress. Breath sounds: Examination of the right-lower field reveals rales. Examination of the left-lower field reveals rales. Rales present. No wheezing. Abdominal:  
   General: Bowel sounds are normal. There is no distension. Palpations: Abdomen is soft. Tenderness: There is no abdominal tenderness. Musculoskeletal: Normal range of motion. General: No deformity. Skin: 
   General: Skin is warm and dry. Findings: No rash. Neurological:  
   Mental Status: She is alert and oriented to person, place, and time. Coordination: Coordination normal.  
Psychiatric:     
   Behavior: Behavior normal.  
 
 
 
Diagnostic Study Results Labs - Recent Results (from the past 24 hour(s)) EKG, 12 LEAD, INITIAL Collection Time: 02/28/21  5:30 PM  
Result Value Ref Range Ventricular Rate 91 BPM  
 Atrial Rate 82 BPM  
 QRS Duration 164 ms Q-T Interval 422 ms QTC Calculation (Bezet) 519 ms Calculated R Axis -156 degrees Calculated T Axis 75 degrees Diagnosis Ventricular-paced rhythm When compared with ECG of 19-DEC-2020 16:43, No significant change was found CBC WITH AUTOMATED DIFF Collection Time: 02/28/21  6:19 PM  
Result Value Ref Range WBC 6.3 3.6 - 11.0 K/uL  
 RBC 4.85 3.80 - 5.20 M/uL  
 HGB 12.5 11.5 - 16.0 g/dL HCT 43.8 35.0 - 47.0 % MCV 90.3 80.0 - 99.0 FL  
 MCH 25.8 (L) 26.0 - 34.0 PG  
 MCHC 28.5 (L) 30.0 - 36.5 g/dL RDW 27.0 (H) 11.5 - 14.5 % PLATELET 669 971 - 761 K/uL MPV 10.4 8.9 - 12.9 FL  
 NRBC 0.0 0  WBC ABSOLUTE NRBC 0.00 0.00 - 0.01 K/uL NEUTROPHILS 77 (H) 32 - 75 % LYMPHOCYTES 11 (L) 12 - 49 % MONOCYTES 10 5 - 13 % EOSINOPHILS 1 0 - 7 % BASOPHILS 1 0 - 1 % IMMATURE GRANULOCYTES 0 0.0 - 0.5 % ABS. NEUTROPHILS 4.8 1.8 - 8.0 K/UL  
 ABS. LYMPHOCYTES 0.7 (L) 0.8 - 3.5 K/UL  
 ABS. MONOCYTES 0.6 0.0 - 1.0 K/UL  
 ABS. EOSINOPHILS 0.1 0.0 - 0.4 K/UL  
 ABS. BASOPHILS 0.1 0.0 - 0.1 K/UL  
 ABS. IMM. GRANS. 0.0 0.00 - 0.04 K/UL  
 DF SMEAR SCANNED    
 PLATELET COMMENTS CLUMPED PLATELETS    
 RBC COMMENTS ANISOCYTOSIS 2+ 
    
 RBC COMMENTS DAKOTAH CELLS 
PRESENT 
    
 RBC COMMENTS SCHISTOCYTES PRESENT 
    
 RBC COMMENTS POLYCHROMASIA PRESENT 
    
NT-PRO BNP Collection Time: 02/28/21  6:48 PM  
Result Value Ref Range NT pro-BNP 30,899 (H) <125 PG/ML  
TROPONIN I Collection Time: 02/28/21  6:48 PM  
Result Value Ref Range Troponin-I, Qt. <0.05 <0.05 ng/mL CK W/ REFLX CKMB  Collection Time: 02/28/21  6:48 PM Result Value Ref Range CK 40 26 - 487 U/L  
METABOLIC PANEL, COMPREHENSIVE Collection Time: 02/28/21  6:48 PM  
Result Value Ref Range Sodium 142 136 - 145 mmol/L Potassium 4.0 3.5 - 5.1 mmol/L Chloride 113 (H) 97 - 108 mmol/L  
 CO2 19 (L) 21 - 32 mmol/L Anion gap 10 5 - 15 mmol/L Glucose 180 (H) 65 - 100 mg/dL BUN 22 (H) 6 - 20 MG/DL Creatinine 1.97 (H) 0.55 - 1.02 MG/DL  
 BUN/Creatinine ratio 11 (L) 12 - 20 GFR est AA 31 (L) >60 ml/min/1.73m2 GFR est non-AA 26 (L) >60 ml/min/1.73m2 Calcium 8.8 8.5 - 10.1 MG/DL Bilirubin, total 0.8 0.2 - 1.0 MG/DL  
 ALT (SGPT) 14 12 - 78 U/L  
 AST (SGOT) 14 (L) 15 - 37 U/L Alk. phosphatase 96 45 - 117 U/L Protein, total 7.2 6.4 - 8.2 g/dL Albumin 3.4 (L) 3.5 - 5.0 g/dL Globulin 3.8 2.0 - 4.0 g/dL A-G Ratio 0.9 (L) 1.1 - 2.2 Radiologic Studies -  
XR CHEST PORT Final Result Mild pulmonary edema with small left pleural effusion. CT Results  (Last 48 hours) None CXR Results  (Last 48 hours) 02/28/21 1812  XR CHEST PORT Final result Impression:  Mild pulmonary edema with small left pleural effusion. Narrative: Indication: Shortness of breath Comparison: 12/19/2020 Portable exam of the chest obtained at 1801 demonstrates normal heart size. Mild  
pulmonary edema is noted. There is a small left pleural effusion. The osseous  
structures are unremarkable. Medical Decision Making I am the first provider for this patient. I reviewed the vital signs, available nursing notes, past medical history, past surgical history, family history and social history. Vital Signs-Reviewed the patient's vital signs. Patient Vitals for the past 12 hrs: 
 Temp Pulse Resp BP SpO2  
02/28/21 1930  90 18 (!) 175/93 96 % 02/28/21 1826  90  (!) 163/89   
02/28/21 1750     100 % 02/28/21 1719 98.8 °F (37.1 °C) 90 20 (!) 175/93 100 % Records Reviewed: Nursing records and medical records reviewed MDM: 
Patient presents with acute dyspnea. DDx: asthma, copd, pna, pulmonary edema, acute bronchitis, ACS, ptx, pna. Will obtain EKG, labs, CXR, provide O2 as needed for hypoxia, treat symptomatically and reassess. Will continue to monitor closely in ED. Provider Notes (Medical Decision Making):  
Patient is a 79-year-old female with known history of CHF presenting with symptoms consistent with acute CHF exacerbation. Patient with severe tachypnea, increased work of breathing with any exertion. Although not hypoxic on room air will need admission for IV diuresis and further work-up. Discussed with cardiology who requested patient to be admitted for diuresis. ED Course:  
Initial assessment performed. The patients presenting problems have been discussed, and they are in agreement with the care plan formulated and outlined with them. I have encouraged them to ask questions as they arise throughout their visit. ED Course as of Feb 28 2233 Brittni Recinos Feb 28, 2021 2227 Consult with Dr. Kendall Yates, from cardiology, requesting admission for IV diuresis and work-up. [CC] ED Course User Index 
[CC] Allison Cosme MD  
 
 
 
 
 
 
Critical Care: 
None Disposition: 
Admit Note: 
7:32 PM 
Pt is being admitted by hospitalist. The results of their tests and reason(s) for their admission have been discussed with pt and/or available family. They convey agreement and understanding for the need to be admitted and for admission diagnosis. Diagnosis Clinical Impression: 1. Acute on chronic combined systolic and diastolic congestive heart failure ([address] Attestations: 
 
Adam Cid MD 
 
Please note that this dictation was completed with [email or web address], the [employer] voice recognition software.   Quite often unanticipated grammatical, syntax, homophones, and other interpretive errors are inadvertently transcribed by the computer software. Please disregard these errors. Please excuse any errors that have escaped final proofreading. Thank you.

## 2021-02-28 NOTE — ED TRIAGE NOTES
Assumed care of pt from triage. Pt is A&O x 4. Pt reports CC of shortness of breath since Friday. Pt sats at this time are 100% on RA. Pt says her whole body is swollen and legs are weeping. Pt does have a left sided BKA. Pt was recently hospitalized in December with pneumonia. Pt does take a fluid pill and says she took hers this morning. EKG completed. Pt resting on stretcher in POC with call bell in reach. Pt placed on monitor x 3. VSS at this time. 1805: X-ray at bedside. 1832: Pt placed on purewick. Medicated pt per orders. 1927: Bedside and Verbal shift change report given to [address] (oncoming nurse) by Melonie Banuelos (offgoing nurse).  Report included the following information SBAR, ED Summary and Recent Results.

## 2021-03-01 NOTE — PROGRESS NOTES
Pharmacy Clarification of the Prior to Admission Medication Regimen Retrospective to the Admission The patient was  interviewed regarding clarification of the prior to admission medication regimen. Patient was questioned regarding use of any other inhalers, topical products, over the counter medications, herbal medications, vitamin products or ophthalmic/nasal/otic medication use. Information Obtained From: Patient Recommendations/Findings: The following amendments were made to the patient's active medication list on file at [address]:  
 
1) Additions: ? Vitamin D3 ? Vitamin A ? Vitamin B12 
 
2) Removals: 0 
 
 
3) Changes: 0 
 
4) Pertinent Pharmacy Findings: 
? Updated patients preferred outpatient pharmacy to: [facility] in [place] ? Identified High Alert Medication Information 
o Current Anticoagulants: 
? Name: Warfarin 2mg Note:  I discussed PTA Warfarin compliance with patient; she states that she has not felt well and \"it is possible that doses may have been missed\"// Rajani Almanza [facility] 
 
 PTA medication list was corrected to the following:  
 
Prior to Admission Medications Prescriptions Last Dose Informant Taking?  
busPIRone (BUSPAR) 10 mg tablet  Self Yes Sig: TAKE ONE TABLET BY MOUTH TWICE A DAY cholecalciferol (Vitamin D3) (1000 Units /25 mcg) tablet  Self Yes Sig: Take 1,000 Units by mouth daily. cyanocobalamin (Vitamin B-12) 500 mcg tablet  Self Yes Sig: Take 500 mcg by mouth daily. hydrALAZINE (APRESOLINE) 100 mg tablet  Self Yes Sig: Take 1 Tab by mouth three (3) times daily. pantoprazole (PROTONIX) 40 mg tablet  Self Yes Sig: Take 1 Tab by mouth Before breakfast and dinner. sertraline (ZOLOFT) 100 mg tablet  Self Yes Sig: Take 100 mg by mouth daily. sertraline (ZOLOFT) 25 mg tablet  Self Yes Sig: Take 25 mg by mouth daily. Take with 100 mg tablet every morning  
vitamin a-vitamin c-vit e-min (OCUVITE) tablet  Self Yes Sig: Take 1 Tab by mouth daily.  
warfarin (Coumadin) 1 mg tablet  Self Yes  
Sig: Take 2 mg by mouth daily.  
  
Facility-Administered Medications: None  
  
 
Thank you, 
Tresa Kang CPhT 
Medication History Pharmacy Technician

## 2021-03-01 NOTE — PROGRESS NOTES
Received message from patient's nurse Amee Hameed stating : 
 
She is beginning to wheeze again, can we give her another neb. She responded well to duoneb last night. Discussion / orders: 
 
Ordered duo-neb q6h prn Please note that this note was dictated using Dragon computer voice recognition software. Quite often unanticipated grammatical, syntax, homophones, and other interpretive errors are inadvertently transcribed by the computer software. Please disregard these errors. Please excuse any errors that have escaped final proofreading.

## 2021-03-01 NOTE — ED NOTES
Bedside shift change report given to [address] Orantes (oncoming nurse) by Rafael Cedeno (offgoing nurse). Report included the following information SBAR, ED Summary and Recent Results. 0830 Medicated pt per orders. 6341- Cardiology at bedside evaluating pt. 1030- Pt resting on stretcher in POC with call bell in reach. Pt remains on monitor x 3. VSS at this time. 1130- Medicated pt per orders. Will continue to monitor. 1140- TRANSFER - OUT REPORT: 
 
Verbal report given to [facility] (name) on Maykel Rios  being transferred to Neuro (unit) for routine progression of care Report consisted of patients Situation, Background, Assessment and  
Recommendations(SBAR). Information from the following report(s) SBAR, ED Summary, MAR, Recent Results and Cardiac Rhythm Paced was reviewed with the receiving nurse. Lines:  
Venous Access Device Proline CT with cuff 09/08/20 (Active) Peripheral IV 02/28/21 Left Forearm (Active) Site Assessment Clean, dry, & intact 02/28/21 1820 Phlebitis Assessment 0 02/28/21 1820 Infiltration Assessment 0 02/28/21 1820 Dressing Status Clean, dry, & intact 02/28/21 1820 Dressing Type Transparent 02/28/21 1820 Hub Color/Line Status Pink;Flushed 02/28/21 1820 Action Taken Blood drawn 02/28/21 1820 Opportunity for questions and clarification was provided. Patient transported with: 
 Transport

## 2021-03-01 NOTE — PROGRESS NOTES
Hospitalist Progress Note NAME: Britt Caro :  1960 MRN:  [id number] Assessment / Plan: 
Acute on chronic diastolic HF poa PAF/SSS s/p ablation and leadless PM insertion HTN  
- CXR Mild pulmonary edema with small left pleural effusion. Pro bnp 30K  
BP high side Echo 2020: ef 55% Rapid covid test negative Hx: Admitted  [facility] [address] [facility] from -20 for acute respiratory failure s/t community-acquired pneumonia and acute on chronic heart failure.  Treated with IV antibiotics and diuresed with Bumex IV.   Had PARAG atop CKD IV, seen by Dr. Kelvin Wylie. She was dc on Bumex 1 mg BID and carvedilol 12.5 mg BID. - aggressive diureses with bumex IV Close watch of wt, cr  
Cont coreg as per last dc summary and cardiology OP note Start scheduled NTP x 24 hr  
- continue coumadin, INR sub therapeutic 1.2  
- primary cardiology consulted CKD 4 Baseline Cr 2.0, 1.9 on admission Nephrology as needed  
monitor closely. Avoid nephrotoxic drugs, adjust all meds to GFR. Diabetes mellitus: Continue sliding scale insulin and diabetic diet Anxiety: cont home meds Left BKA 2020 Body mass index is 42.83 kg/m².: 40 or above Morbid obesity Pharmacy consulted for med rec Code Status: Full code Surrogate Decision Maker:  DVT Prophylaxis: Already on Coumadin Baseline: independent  lives with her , ambulates with prosthesis Recommended Disposition:  Home pending clinical progress Subjective: Chief Complaint / Reason for Physician Visit: FU HF Seen in ED Mild to moderate dyspnea with conversation Not significant improvement since admission No hypoxic 
 no cp Discussed with RN events overnight. Review of Systems: 
Symptom Y/N Comments  Symptom Y/N Comments Fever/Chills n   Chest Pain n   
Poor Appetite    Edema Cough    Abdominal Pain Sputum    Joint Pain SOB/CHOW y   Pruritis/Rash Nausea/vomit    Tolerating PT/OT Diarrhea    Tolerating Diet Constipation    Other Could NOT obtain due to:   
 
Objective: VITALS:  
Last 24hrs VS reviewed since prior progress note. Most recent are: 
Patient Vitals for the past 24 hrs: 
 Temp Pulse Resp BP SpO2  
03/01/21 0824  90  (!) 163/80   
03/01/21 0730 97.3 °F (36.3 °C) 91 20 (!) 160/83 94 % 03/01/21 0430  90 14 (!) 160/82 96 % 03/01/21 0345  91 26 (!) 152/84 96 % 03/01/21 0215  90 20 (!) 165/83 93 % 03/01/21 0200  90 22 (!) 168/86 94 % 03/01/21 0045  90 18 (!) 157/83 96 % 02/28/21 2230  90 (!) 38 139/83 94 % 02/28/21 2215  90 (!) 33 (!) 153/85 97 % 02/28/21 2130  91 27 (!) 157/81 98 % 02/28/21 2045  90 24 (!) 167/96 99 % 02/28/21 1930  90 18 (!) 175/93 96 % 02/28/21 1826  90  (!) 163/89   
02/28/21 1750     100 % 02/28/21 1719 98.8 °F (37.1 °C) 90 20 (!) 175/93 100 % Intake/Output Summary (Last 24 hours) at 3/1/2021 9310 Last data filed at 3/1/2021 6612 Gross per 24 hour Intake  Output 1000 ml Net -1000 ml I had a face to face encounter and independently examined this patient on 3/1/2021, as outlined below: PHYSICAL EXAM: 
General: WD, WN. Alert, cooperative, no acute distress, mild to moderate dyspnea at rest    
EENT:  EOMI. Anicteric sclerae. MMM Resp:  diminished BS bilaterally, no wheezing or rales. No accessory muscle use CV:  Regular  rhythm,  No edema GI:  Soft, Non distended, Non tender. +Bowel sounds Neurologic:  Alert and oriented X 3, normal speech, Psych:   Good insight. Not anxious nor agitated Skin:  No rashes. No jaundice Reviewed most current lab test results and cultures  YES Reviewed most current radiology test results   YES Review and summation of old records today    NO Reviewed patient's current orders and MAR    YES 
PMH/SH reviewed - no change compared to H&P 
________________________________________________________________________ Care Plan discussed with: 
  Comments Patient y Family RN y   
Care Manager Consultant Multidiciplinary team rounds were held today with , nursing, pharmacist and clinical coordinator. Patient's plan of care was discussed; medications were reviewed and discharge planning was addressed. ________________________________________________________________________ Total NON critical care TIME:  35  Minutes Total CRITICAL CARE TIME Spent:   Minutes non procedure based Comments >50% of visit spent in counseling and coordination of care y   
________________________________________________________________________ Ingrid Razo MD  
 
Procedures: see electronic medical records for all procedures/Xrays and details which were not copied into this note but were reviewed prior to creation of Plan. LABS: 
I reviewed today's most current labs and imaging studies. Pertinent labs include: 
Recent Labs 03/01/21 
0419 02/28/21 
1819 WBC 5.3 6.3 HGB 11.0* 12.5 HCT 37.7 43.8  278 Recent Labs 03/01/21 
0621 03/01/21 
4139 02/28/21 
1848 NA  --  142 142 K  --  4.0 4.0  
CL  --  115* 113* CO2  --  19* 19* GLU  --  155* 180* BUN  --  22* 22* CREA  --  1.96* 1.97* CA  --  8.6 8.8 ALB  --  3.0* 3.4* TBILI  --  1.6* 0.8 ALT  --  11* 14 INR 1.2*  --   --   
 
 
Signed: Ingrid Razo MD

## 2021-03-01 NOTE — PROGRESS NOTES
End of Shift Note Bedside shift change report given to Robbie Lennox (oncoming nurse) by Regina Patino RN (offgoing nurse). Report included the following information SBAR Shift worked:  7-3 Shift summary and any significant changes: NEw admit from ER Concerns for physician to address:    
Zone phone for oncoming shift:     
 
Patient Information Erick Pandey 63 y.o. 
2/28/2021  5:20 PM by Yesica Arroyo MD. Erick Pandey was admitted from Home 
 
Problem List 
Patient Active Problem List  
 Diagnosis Date Noted  S/P atrioventricular rolando ablation 11/17/2020 Priority: 1 - One  Gastroesophageal reflux disease without esophagitis 12/29/2020  Acute respiratory failure ([address] 12/20/2020  UTI (urinary tract infection) 12/20/2020  Pneumonia 12/20/2020  
 SOB (shortness of breath) 12/20/2020  CKD (chronic kidney disease) 12/20/2020  Anticoagulated 12/20/2020  PARAG (acute kidney injury) ([address] 12/20/2020  Fluid overload 12/20/2020  Subtherapeutic international normalized ratio (INR) 12/20/2020  Suspected COVID-19 virus infection 12/20/2020  Chest pain 11/23/2020  Left below-knee amputee ([address] 06/11/2020  H/O bilateral mastectomy 03/26/2020  Foot deformity, right 09/24/2019  Pes cavus 09/24/2019  Diabetic polyneuropathy associated with type 2 diabetes mellitus ([address] 11/13/2018  Morbid obesity with BMI of 40.0-44.9, adult ([address] 05/01/2017  Controlled type 2 diabetes mellitus with stage 4 chronic kidney disease, with long-term current use of insulin ([address] 01/16/2017  PAF (paroxysmal atrial fibrillation) ([address] 11/28/2016  Anemia in stage 4 chronic kidney disease ([address] 11/28/2016  Essential hypertension 08/26/2016  Systolic murmur 81/36/3537  CKD (chronic kidney disease), stage IV ([address] 06/09/2012  Mixed hyperlipidemia 05/22/2012  Long term (current) use of anticoagulants 04/11/2012  S/P cardiac pacemaker procedure 04/03/2012  Sick sinus syndrome ([facility] 04/02/2012  Status post ablation of atrial fibrillation 12/15/2011  Fe deficiency anemia 04/14/2010  
 History of breast cancer 04/13/2010  Asthma 04/13/2010 Past Medical History:  
Diagnosis Date  Acquired lymphedema Right arm  Acute respiratory failure ([address] 12/19/2020  Arrhythmia  Asthma  Atrial fibrillation ([facility] Dr. Ludwin Can and Dr. Cheung [facility])  Cancer ([address] bilateral breast  
 Chronic kidney disease  Diabetes mellitus type II   
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 
CHF:Yes Yes PNA:No No 
 
Activity: 
Activity Level: Bed Rest 
Number times ambulated in hallways past shift: 0 Number of times OOB to chair past shift: 0 Cardiac:  
Cardiac Monitoring: Yes     
Cardiac Rhythm: Paced Access:  
Current line(s): PIV Genitourinary:  
Urinary status: incontinent external catheter Respiratory:  
O2 Device: Room air Chronic home O2 use?: NO Incentive spirometer at bedside: NO 
  
 
GI: 
Last Bowel Movement Date: 02/28/21 Current diet:  DIET CARDIAC Regular; FR 1200ML Passing flatus: YES Tolerating current diet: YES Pain Management:  
Patient states pain is manageable on current regimen: YES Skin: 
Valdemar Score: 16 Interventions: turn team 
 
Patient Safety: 
Fall Score: Total Score: 3 Interventions: bed/chair alarm High Fall Risk: Yes DVT prophylaxis: DVT prophylaxis Med- No Wafarin DVT prophylaxis SCD or LINA- No  
 
Wounds: (If Applicable) Wounds- Yes Location callous type ulcer on right great and second toe and under foot Active Consults: 
IP CONSULT TO CARDIOLOGY 
IP CONSULT TO HOSPITALIST Length of Stay: 
Expected LOS: - - - Actual LOS: 1 Discharge Plan: Yes KATARINA Ogden RN

## 2021-03-01 NOTE — ED NOTES
Bedside shift change report given to [address]  (oncoming nurse) by Justyn Chan (offgoing nurse). Report included the following information SBAR, Kardex, ED Summary, Recent Results and Med Rec Status. 2135- called consult to Cards- on-call is Kait NARAYANAN 
 
2240- Pt c/o sudden onset SOB. Pt appears tacypnic, coughing and labored. Notified MD. Javi Maradiaga. Pt lung sounds wheezing and tight. Duoneb given. O2 sats 97% on RA. Pt appears to be responding. Will request a COVID test.  
 
2300- Hospitalist at bedside. Pt educated on code status. Pt educated on breathing exercises to assist with rapid breathing. Neb treatment decreased wheezing. Family member leaving. SRx2, Monitor x3, VSS at this time. 0030- Pt resting- SRx2 monitor x3, call bell within reach. Pure Wick in place. 0130-  Pt resting- SRx2 monitor x3, call bell within reach. Pure Wick in place. 0230-  Pt resting- SRx2 monitor x3, call bell within reach. Pure Wick in place. 0330-  Pt resting- SRx2 monitor x3, call bell within reach. Pure Wick in place. 6269- Labs drawn- will send. Pure Wick in place. 0113- pt c/o 10/10 pain generalized body pain. Medication given and repositioned. Requesting a neb treatment, as pt sounds wheezy.

## 2021-03-01 NOTE — CONSULTS
[address] [facility], [facility] Date of  Admission: 2/28/2021  5:20 PM  
 
Admission type:Emergency Consult for:HF exacerbation Consult by: hospitalist 
 
 Subjective:  
 
Jeyson Beltre is a 63 y.o. female admitted for Acute respiratory failure ([facility] [J96.00]. Admitted with c/o worsening SOB, weight gain. Followed by Dr. Kaleb Grover , last seen Jan 2021. Previous admission 12/2020 for CAP, HF. Denies CP, palpitations, dizziness/lightheadedness ECG paced NTproBNP 30,899 PMH: Tachybrady syndrome s/p ablation and PM, HTN, HLD, DM, CKD IV, anemia, PAD with LBKA Previous treatment/evaluation includes echocardiogram . Cardiac risk factors: family history, dyslipidemia, diabetes mellitus, obesity, sedentary life style, hypertension, stress, post-menopausal. 
 
 
Patient Active Problem List  
 Diagnosis Date Noted  S/P atrioventricular rolando ablation 11/17/2020 Priority: 1 - One  Gastroesophageal reflux disease without esophagitis 12/29/2020  Acute respiratory failure ([facility] 12/20/2020  UTI (urinary tract infection) 12/20/2020  Pneumonia 12/20/2020  
 SOB (shortness of breath) 12/20/2020  CKD (chronic kidney disease) 12/20/2020  Anticoagulated 12/20/2020  PARAG (acute kidney injury) ([facility] 12/20/2020  Fluid overload 12/20/2020  Subtherapeutic international normalized ratio (INR) 12/20/2020  Suspected COVID-19 virus infection 12/20/2020  Chest pain 11/23/2020  Left below-knee amputee ([facility] 06/11/2020  H/O bilateral mastectomy 03/26/2020  Foot deformity, right 09/24/2019  Pes cavus 09/24/2019  Diabetic polyneuropathy associated with type 2 diabetes mellitus ([address] 11/13/2018  Morbid obesity with BMI of 40.0-44.9, adult ([address] 05/01/2017  Controlled type 2 diabetes mellitus with stage 4 chronic kidney disease, with long-term current use of insulin ([address] 01/16/2017  PAF (paroxysmal atrial fibrillation) ([facility] 11/28/2016  Anemia in stage 4 chronic kidney disease ([address] 11/28/2016  Essential hypertension 08/26/2016  Systolic murmur 59/05/0427  CKD (chronic kidney disease), stage IV ([address] 06/09/2012  Mixed hyperlipidemia 05/22/2012  Long term (current) use of anticoagulants 04/11/2012  S/P cardiac pacemaker procedure 04/03/2012  Sick sinus syndrome ([address] 04/02/2012  Status post ablation of atrial fibrillation 12/15/2011  Fe deficiency anemia 04/14/2010  
 History of breast cancer 04/13/2010  Asthma 04/13/2010 Yady Joseph MD 
Past Medical History:  
Diagnosis Date  Acquired lymphedema Right arm  Acute respiratory failure ([address] 12/19/2020  Arrhythmia  Asthma  Atrial fibrillation ([address] Dr. Kiara North and Dr. Darryl Puckett [facility])  Cancer ([address] bilateral breast  
 Chronic kidney disease  Diabetes mellitus type II   
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 ([address] 3/3/2020  Dizziness  Essential hypertension  Hyperlipidemia  Hypertension  Long term (current) use of anticoagulants  Morbid obesity ([address] 3/14/2012  Nausea & vomiting  Neuropathy  Osteoarthritis  Pacemaker  Sick sinus syndrome ([address]  Type 2 diabetes mellitus with left diabetic foot infection ([address] 10/29/2019 Social History Socioeconomic History  Marital status:  Spouse name: Not on file  Number of children: Not on file  Years of education: Not on file  Highest education level: Not on file Tobacco Use  Smoking status: Never Smoker  Smokeless tobacco: Never Used Substance and Sexual Activity  Alcohol use: Not Currently  Drug use: No  
 
Allergies Allergen Reactions  Avapro [Irbesartan] Unable to Obtain  Bactrim [Sulfamethoxazole-Trimethoprim] Other (comments)   Sore mouth  Contrast Agent [Iodine] Itching [address]  Morphine Nausea and Vomiting and Swelling  Penicillins Hives Did not tolerate cefepime 3/2020  Pravachol [Pravastatin] Nausea Only  Seafood [Shellfish Containing Products] Hives  Singulair [Montelukast] Other (comments)  
  palpitations  Ace Inhibitors Cough  Amlodipine Swelling  Captopril Cough  Carvedilol Diarrhea Family History Problem Relation Age of Onset  Cancer Mother  Heart Disease Mother  Alcohol abuse Father  Diabetes Brother  Hypertension Brother Current Facility-Administered Medications Medication Dose Route Frequency  pantoprazole (PROTONIX) tablet 40 mg  40 mg Oral ACB&D  
 sodium chloride (NS) flush 5-40 mL  5-40 mL IntraVENous Q8H  
 sodium chloride (NS) flush 5-40 mL  5-40 mL IntraVENous PRN  
 acetaminophen (TYLENOL) tablet 650 mg  650 mg Oral Q6H PRN Or  
 acetaminophen (TYLENOL) suppository 650 mg  650 mg Rectal Q6H PRN  polyethylene glycol (MIRALAX) packet 17 g  17 g Oral DAILY PRN  promethazine (PHENERGAN) tablet 12.5 mg  12.5 mg Oral Q6H PRN Or  
 ondansetron (ZOFRAN) injection 4 mg  4 mg IntraVENous Q6H PRN  
 oxyCODONE IR (ROXICODONE) tablet 5 mg  5 mg Oral Q4H PRN  
 oxyCODONE IR (ROXICODONE) tablet 10 mg  10 mg Oral Q4H PRN  
 albuterol-ipratropium (DUO-NEB) 2.5 MG-0.5 MG/3 ML  3 mL Nebulization Q6H PRN  
 WARFARIN INFORMATION NOTE (COUMADIN)   Other QPM  
 bumetanide (BUMEX) injection 2 mg  2 mg IntraVENous BID  bumetanide (BUMEX) injection 1 mg  1 mg IntraVENous ONCE  
 sertraline (ZOLOFT) tablet 125 mg  125 mg Oral DAILY  busPIRone (BUSPAR) tablet 10 mg  10 mg Oral BID  carvediloL (COREG) tablet 12.5 mg  12.5 mg Oral BID WITH MEALS  nitroglycerin (NITROBID) 2 % ointment 1 Inch  1 Inch Topical Q6H  
 ondansetron (ZOFRAN) injection 4 mg  4 mg IntraVENous Q4H PRN  
 hydrALAZINE (APRESOLINE) 20 mg/mL injection 10 mg  10 mg IntraVENous Q2H PRN Current Outpatient Medications Medication Sig  warfarin (Coumadin) 1 mg tablet Take 2 mg by mouth daily.  pantoprazole (PROTONIX) 40 mg tablet Take 1 Tab by mouth Before breakfast and dinner.  sertraline (ZOLOFT) 100 mg tablet  sertraline (ZOLOFT) 25 mg tablet Take 25 mg by mouth daily. Take with 100 mg tablet every morning  hydrALAZINE (APRESOLINE) 100 mg tablet Take 1 Tab by mouth three (3) times daily.  busPIRone (BUSPAR) 10 mg tablet TAKE ONE TABLET BY MOUTH TWICE A DAY Review of Symptoms:  
11 systems reviewed, negative other than as stated in the HPI Objective:  
  
Visit Vitals BP (!) 163/80 Pulse 90 Temp 97.3 °F (36.3 °C) Resp 20 Ht 5' 10\" (1.778 m) Wt 298 lb 8.1 oz (135.4 kg) SpO2 94% BMI 42.83 kg/m² Physical:  
General: morbidly obese Heart: paced, no m/S3/JVD, no carotid bruits Lungs: clear Abdomen: Soft, +BS, NTND Extremities: LE latha +DP/PT, trace on RLE edema, LBKA Neurologic: Grossly normal 
 
Data Review:  
Recent Labs 03/01/21 
0419 02/28/21 
1819 WBC 5.3 6.3 HGB 11.0* 12.5 HCT 37.7 43.8  278 Recent Labs 03/01/21 
7715 03/01/21 
4645 02/28/21 
1848 NA  --  142 142 K  --  4.0 4.0  
CL  --  115* 113* CO2  --  19* 19* GLU  --  155* 180* BUN  --  22* 22* CREA  --  1.96* 1.97* CA  --  8.6 8.8 ALB  --  3.0* 3.4* TBILI  --  1.6* 0.8 ALT  --  11* 14 INR 1.2*  --   --   
 
 
Recent Labs  
  02/28/21 
2305 02/28/21 
1848 TROIQ <0.05 <0.05 Intake/Output Summary (Last 24 hours) at 3/1/2021 9584 Last data filed at 3/1/2021 4118 Gross per 24 hour Intake  Output 1000 ml Net -1000 ml Cardiographics Telemetry: paced ECG: paced Echocardiogram: 11/2020 · LV: Estimated LVEF is 50 - 55%. Visually measured ejection fraction. Normal cavity size and systolic function (ejection fraction normal). Mild concentric hypertrophy.  Abnormal left ventricular septal motion consistent with right ventricular pacing. · LA: Moderately dilated left atrium. · RA: Mildly dilated right atrium. · AV: Aortic valve leaflet calcification present without reduced excursion. · MV: Mitral valve non-specific thickening. Moderate mitral annular calcification. Mild mitral valve stenosis is present. · TV: Mild tricuspid valve regurgitation is present. CXRAY:Mild pulmonary edema with small left pleural effusion. Assessment:  
  
 Principal Problem: 
  Acute respiratory failure ([facility] (12/20/2020) Active Problems: S/P atrioventricular rolando ablation (11/17/2020) S/P cardiac pacemaker procedure (4/3/2012) Overview: 4/2/12 Medtronic dual chamber pacemaker implant Pacer removed April 2020 due vegetations related to diabetic foot  
    osteomyelitis. Mixed hyperlipidemia (5/22/2012) CKD (chronic kidney disease), stage IV ([address] (6/9/2012) Overview: Acute on chronic, Dr. Zaida Wade Essential hypertension (8/26/2016) Morbid obesity with BMI of 40.0-44.9, adult ([facility] (5/1/2017) Plan:  
 
Acute on chronic diastolic HF/HFpEF: (EF 19-70%) Not sure what caused exacerbation, diet? CKD? Increasing Bumex to 2mg BID Monitor I/Os, daily weights on stand up scale and labs If weights are correct weight is drastically up Discussed compliance with daily weights, diet Nephrology following PAF with AV rolando ablation/Medtronic pacemaker: 
Warfarin continues, INR has not been therapeutic, consider changing to NOAC Chronic CKD: 
Nephrology to be consulted HTN: 
Controlled with current therapy 
  
Thank you for consulting DEE Allison NP Patient seen and examined by me with the above nurse practitioner. I personally performed all components of the history, physical, and medical decision making and agree with the assessment and plan with minor modifications as noted.   Acute on chronic diastolic heart failure, not monitoring weights at home. Counseled at length about CHF instructions including daily weights. Increase Bumex to 2 mg twice a day for now. Continue anticoagulation for AF.

## 2021-03-01 NOTE — PROGRESS NOTES
Received message from patient's nurse Candelario Garcia stating : 
 
Pt is requesting something more than Tylenol. Pt states her pain is 10/10 located in her back, legs, bottom, body. I have been turning and repositioning her, as I think its related mostly to the swelling and the bed, but nothing is helping. Discussion / orders: 
 
Ordered oxycodone 5 mg p.o. every 4 hours as needed for moderate pain and 10 mg p.o. every 4 hours as needed for severe pain Please note that this note was dictated using Dragon computer voice recognition software. Quite often unanticipated grammatical, syntax, homophones, and other interpretive errors are inadvertently transcribed by the computer software. Please disregard these errors. Please excuse any errors that have escaped final proofreading.

## 2021-03-01 NOTE — H&P
Hospitalist Admission Note NAME: Tarah Hdez :  1960 MRN:  [id number] Date/Time:  2021 10:40 PM 
 
Patient PCP: Nawaf Veras MD 
________________________________________________________________________ My assessment of this patient's clinical condition and my plan of care is as follows. Assessment / Plan: Hypoxic respiratory failure present on admission Likely from decompensated diastolic CHF present on admission CXR Mild pulmonary edema with small left pleural effusion. Pro bnp 30K Bumex 1 mg twice daily started. His creatinine is almost 2 need more Bumex at we have to be careful on renal function as well. Is and Os  daily. Weigh the patient daily. Low-salt diet. echoCardiogram 
Cardiology consult in the morning Rapid Covid test just sent. PARAG on CKD 4 Avoid nephrotoxic substances. Check creatinine in the morning. Patient is on Bumex now. ?  Cardiorenal.  If renal function got worse please have nephrology work with you. Sending urine sodium. History of paroxysmal atrial fibrillation status post AV node ablation Continue  patient's Coumadin Rate is controlled now Diabetes mellitus: Continue sliding scale insulin and diabetic diet Hypertension continue current medication She is on Zoloft as well. Once she is stable can be started Left BKA noted Body mass index is 42.83 kg/m².: 40 or above Morbid obesity Code Status: Full code. Discussed with patient's  and patient in the room Surrogate Decision Maker:  DVT Prophylaxis: Already on Coumadin GI Prophylaxis: not indicated Baseline: independent  lives with her . Subjective: CHIEF COMPLAINT: Shortness of breath HISTORY OF PRESENT ILLNESS:    
Odalis Richards is a 63 y.o.  female who presents with worsening shortness of breath lower extremity swelling. Patient has left BKA. Patient shortness of breath got worse over the last 2 to 3 days.   Her doctors has been adjusting the diuretic dose but the shortness of breath and the swelling in the leg has gotten worse. She gets to the point where she cannot breathe and she came to the hospital.  Otherwise patient denied any chest pain or fever or chills. Patient denied any cough productive of sputum. She does feel shortness of breath especially when she moves. And give similar history of congestive heart failure in the recent past. 
 
We were asked to admit for work up and evaluation of the above problems. Past Medical History:  
Diagnosis Date  Acquired lymphedema Right arm  Acute respiratory failure ([address] 12/19/2020  Arrhythmia  Asthma  Atrial fibrillation ([address] Dr. Dejah Gonzalez and Dr. Chino Overton [facility])  Cancer ([address] bilateral breast  
 Chronic kidney disease  Diabetes mellitus type II   
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  
Procedure Laterality Date  HX AFIB ABLATION    
 HX AMPUTATION FOOT Left 04/2020  HX BREAST RECONSTRUCTION Bilateral   
 HX CARPAL TUNNEL RELEASE [id number] [address] RIGHT MODIFIED RADICAL   
 HX MASTECTOMY Left   
 no lymphnode removal  
 HX MOHS PROCEDURES  1995  
 right  HX ORTHOPAEDIC Right   
 knee surgery  HX PACEMAKER  11/17/2020 Dr. Karissa Monique. Insertion of permanent pacemaker. AV node ablation  HX PACEMAKER    
 IR INSERT TUNL CVC W PORT OVER 5 YEARS    
 IR INSERT TUNL CVC W/O PORT OVER 5 YR  5/4/2020  IR INSERT TUNL CVC W/O PORT OVER 5 YR  9/8/2020  IR REMOVE TUNL CVAD W/O PORT / PUMP  6/26/2020  IR REMOVE TUNL CVAD W/O PORT / PUMP  10/19/2020  [place] ABDOMEN SURGERY PROC UNLISTED    
 gastric bypass [address] AND 1994  [place] GASTRIC BYPASS,OBESE<150CM SAW-[id number]  7/08  
 [facility]  [place] ICAR CATHETER ABLATION ATRIOVENTR NODE FUNCTION N/A 11/17/2020 ABLATION AV NODE performed by Carey Arriola MD at [address] CATH LAB [address]  [place] NEEDLE BIOPSY LIVER,W OTHR PROC  8.21.07  
 [place] RELOCATION OF SKIN POCKET FOR PACEMAKER N/A 4/24/2020 RELOCATE 2 [facility] performed [facility] [place] [facility] [place] [facility] [id number] [facility] C[facility]  [place] RMVL TRANSVNS PM ELTRD 1 LEAD SYS ATR/VENTR N/A 4/24/2020 Remove Pacemaker Dual Leads performed by Yuliana Garcia MD at [facility] C[facility] CATH LAB  [place] RMVL TRANSVNS PM ELTRD 1 LEAD SYS ATR/VENTR N/A 4/27/2020 REMOVE PACEMAKER DUAL LEADS performed [place] [facility] [address] [facility] CATH [facility]  [place] TCAT INSJ/RPL PERM LEADLESS PACEMAKER RV W/IMG N/A 11/17/2020 INSERT OR REPLACE TRANSCATH PPM LEADLESS performed by Carey Arriola MD at [address] CATH LAB  [place] TRABECULOPLASTY BY LASER SURGERY    
 US GUIDE ASP OVARIAN CYST ABD APPROACH  8.21.07  
 RIGHT Social History Tobacco Use  Smoking status: Never Smoker  Smokeless tobacco: Never Used Substance Use Topics  Alcohol use: Not Currently Family History Problem Relation Age of Onset  Cancer Mother  Heart Disease Mother  Alcohol abuse Father  Diabetes Brother  Hypertension Brother Allergies Allergen Reactions  Avapro [Irbesartan] Unable to Obtain  Bactrim [Sulfamethoxazole-Trimethoprim] Other (comments) Sore mouth  Contrast Agent [Iodine] Itching [address]  Morphine Nausea and Vomiting and Swelling  Penicillins Hives Did not tolerate cefepime 3/2020  Pravachol [Pravastatin] Nausea Only  Seafood [Shellfish Containing Products] Hives  Singulair [Montelukast] Other (comments)  
  palpitations  Ace Inhibitors Cough  Amlodipine Swelling  Captopril Cough  Carvedilol Diarrhea Prior to Admission medications Medication Sig Start Date End Date Taking? Authorizing Provider  
warfarin (Coumadin) 1 mg tablet Take 2 mg by mouth daily. 2/8/21   So Jackson MD  
pantoprazole (PROTONIX) 40 mg tablet Take 1 Tab by mouth Before breakfast and dinner. 12/7/20   Sonya Talavera MD  
sertraline (ZOLOFT) 100 mg tablet  10/30/20   Provider, Historical  
sertraline (ZOLOFT) 25 mg tablet Take 25 mg by mouth daily. Take with 100 mg tablet every morning 10/6/20   Provider, Historical  
hydrALAZINE (APRESOLINE) 100 mg tablet Take 1 Tab by mouth three (3) times daily. 6/12/20   Maurice Tinajero MD  
busPIRone (BUSPAR) 10 mg tablet TAKE ONE TABLET BY MOUTH TWICE A DAY 5/24/19   Maurice Tinajero MD  
 
 
REVIEW OF SYSTEMS:    
I am not able to complete the review of systems because: The patient is intubated and sedated The patient has altered mental status due to his acute medical problems The patient has baseline aphasia from prior stroke(s) The patient has baseline dementia and is not reliable historian The patient is in acute medical distress and unable to provide information Total of 12 systems reviewed as follows:   
   POSITIVE= underlined text  Negative = text not underlined General:  fever, chills, sweats, generalized weakness, weight loss/gain,  
   loss of appetite Eyes:    blurred vision, eye pain, loss of vision, double vision ENT:    rhinorrhea, pharyngitis Respiratory:   cough, sputum production, SOB, CHOW, wheezing, pleuritic pain  
Cardiology:   chest pain, palpitations, orthopnea, PND, edema, syncope Gastrointestinal: abdominal pain , N/V, diarrhea, dysphagia, constipation, bleeding Genitourinary:  frequency, urgency, dysuria, hematuria, incontinence Muskuloskeletal :  arthralgia, myalgia, back pain Hematology:  easy bruising, nose or gum bleeding, lymphadenopathy Dermatological: rash, ulceration, pruritis, color change / jaundice Endocrine:   hot flashes or polydipsia Neurological:  headache, dizziness, confusion, focal weakness, paresthesia, Speech difficulties, memory loss, gait difficulty Psychological: Feelings of anxiety, depression, agitation Objective: VITALS:   
Visit Vitals BP (!) 175/93 Pulse 90 Temp 98.8 °F (37.1 °C) Resp 18 Ht 5' 10\" (1.778 m) Wt 135.4 kg (298 lb 8.1 oz) SpO2 96% BMI 42.83 kg/m² PHYSICAL EXAM: 
 
General:    Alert, cooperative, mild respiratory distress HEENT: Atraumatic, anicteric sclerae, pink conjunctivae No oral ulcers, mucosa moist, throat clear, dentition fair Neck:  Supple, symmetrical,  thyroid: non tender Lungs:   Decreased air entry in the lower lung fields bilateral and no wheezing  
chest wall:  No tenderness  No Accessory muscle use. Heart:   Regular  rhythm,  No  murmur   No edema Abdomen:   Soft, non-tender. Not distended. Bowel sounds normal 
Extremities: No cyanosis. No clubbing,   
  Skin turgor normal, Capillary refill normal, Radial dial pulse 2+ Skin:     Not pale. Not Jaundiced  No rashes Psych:  Good insight. Not depressed. Not anxious or agitated. Neurologic: EOMs intact. No facial asymmetry. No aphasia or slurred speech. Symmetrical strength, Sensation grossly intact. Alert and oriented X 4.  
 
_______________________________________________________________________ Care Plan discussed with: 
  Comments Patient y Family  y   RN y   
Care Manager Consultant:     
_______________________________________________________________________ Expected  Disposition:  
Home with Family y HH/PT/OT/RN   
SNF/LTC   
RENE   
________________________________________________________________________ TOTAL TIME:  65 Minutes Critical Care Provided     Minutes non procedure based Comments  
 y Reviewed previous records  
>50% of visit spent in counseling and coordination of care  Discussion with patient and/or family and questions answered 
  
 
________________________________________________________________________ Signed: Shiela Howard MD 
 
Procedures: see electronic medical records for all procedures/Xrays and details which were not copied into this note but were reviewed prior to creation of Plan. LAB DATA REVIEWED:   
Recent Results (from the past 24 hour(s)) EKG, 12 LEAD, INITIAL Collection Time: 02/28/21  5:30 PM  
Result Value Ref Range Ventricular Rate 91 BPM  
 Atrial Rate 82 BPM  
 QRS Duration 164 ms Q-T Interval 422 ms QTC Calculation (Bezet) 519 ms Calculated R Axis -156 degrees Calculated T Axis 75 degrees Diagnosis Ventricular-paced rhythm When compared with ECG of 19-DEC-2020 16:43, No significant change was found CBC WITH AUTOMATED DIFF Collection Time: 02/28/21  6:19 PM  
Result Value Ref Range WBC 6.3 3.6 - 11.0 K/uL  
 RBC 4.85 3.80 - 5.20 M/uL  
 HGB 12.5 11.5 - 16.0 g/dL HCT 43.8 35.0 - 47.0 % MCV 90.3 80.0 - 99.0 FL  
 MCH 25.8 (L) 26.0 - 34.0 PG  
 MCHC 28.5 (L) 30.0 - 36.5 g/dL RDW 27.0 (H) 11.5 - 14.5 % PLATELET 726 100 - 878 K/uL MPV 10.4 8.9 - 12.9 FL  
 NRBC 0.0 0  WBC ABSOLUTE NRBC 0.00 0.00 - 0.01 K/uL NEUTROPHILS 77 (H) 32 - 75 % LYMPHOCYTES 11 (L) 12 - 49 % MONOCYTES 10 5 - 13 % EOSINOPHILS 1 0 - 7 % BASOPHILS 1 0 - 1 % IMMATURE GRANULOCYTES 0 0.0 - 0.5 % ABS. NEUTROPHILS 4.8 1.8 - 8.0 K/UL  
 ABS. LYMPHOCYTES 0.7 (L) 0.8 - 3.5 K/UL  
 ABS. MONOCYTES 0.6 0.0 - 1.0 K/UL  
 ABS. EOSINOPHILS 0.1 0.0 - 0.4 K/UL  
 ABS. BASOPHILS 0.1 0.0 - 0.1 K/UL  
 ABS. IMM. GRANS. 0.0 0.00 - 0.04 K/UL  
 DF SMEAR SCANNED    
 PLATELET COMMENTS CLUMPED PLATELETS    
 RBC COMMENTS ANISOCYTOSIS 2+ 
    
 RBC COMMENTS DAKOTAH CELLS 
PRESENT 
    
 RBC COMMENTS SCHISTOCYTES PRESENT 
    
 RBC COMMENTS POLYCHROMASIA PRESENT 
    
NT-PRO BNP Collection Time: 02/28/21  6:48 PM  
Result Value Ref Range NT pro-BNP 30,899 (H) <125 PG/ML  
TROPONIN I Collection Time: 02/28/21  6:48 PM  
Result Value Ref Range Troponin-I, Qt. <0.05 <0.05 ng/mL CK W/ REFLX CKMB Collection Time: 02/28/21  6:48 PM  
Result Value Ref Range CK 40 26 - 293 U/L  
METABOLIC PANEL, COMPREHENSIVE Collection Time: 02/28/21  6:48 PM  
Result Value Ref Range Sodium 142 136 - 145 mmol/L Potassium 4.0 3.5 - 5.1 mmol/L Chloride 113 (H) 97 - 108 mmol/L  
 CO2 19 (L) 21 - 32 mmol/L Anion gap 10 5 - 15 mmol/L Glucose 180 (H) 65 - 100 mg/dL BUN 22 (H) 6 - 20 MG/DL Creatinine 1.97 (H) 0.55 - 1.02 MG/DL  
 BUN/Creatinine ratio 11 (L) 12 - 20 GFR est AA 31 (L) >60 ml/min/1.73m2 GFR est non-AA 26 (L) >60 ml/min/1.73m2 Calcium 8.8 8.5 - 10.1 MG/DL Bilirubin, total 0.8 0.2 - 1.0 MG/DL  
 ALT (SGPT) 14 12 - 78 U/L  
 AST (SGOT) 14 (L) 15 - 37 U/L Alk. phosphatase 96 45 - 117 U/L Protein, total 7.2 6.4 - 8.2 g/dL Albumin 3.4 (L) 3.5 - 5.0 g/dL Globulin 3.8 2.0 - 4.0 g/dL A-G Ratio 0.9 (L) 1.1 - 2.2

## 2021-03-01 NOTE — PROGRESS NOTES
Pharmacy Daily Dosing of Warfarin Indication: AFib Goal INR: 2-3 PTA Warfarin Dose: 2 mg po daily Concurrent anticoagulants: NA Concurrent antiplatelet: NA Major Interacting Medications Drugs that may increase INR: NA 
Drugs that may decrease INR: NA Conditions that may increase/decrease INR (CHF, C. diff, cirrhosis, thyroid disorder, hypoalbuminemia): CHF Labs: 
Recent Labs 03/01/21 
0550 03/01/21 
0419 02/28/21 
1848 02/28/21 
1819 02/28/21 1819 INR 1.2*  --   --   --   --   
HGB  --  11.0*  --   --  12.5 PLT  --  239  --   --  278 TBILI  --  1.6* 0.8  --   --   
ALB  --  3.0* 3.4*   < >  --   
 < > = values in this interval not displayed. Impression/Plan:  
INR subtherapeutic, will order warfarin 4 mg PO x 1 dose. Daily INR 
CBC w/o diff QOD Pharmacy will follow daily and adjust the dose as appropriate. Thanks Tenzin Lopez PHARMD 
 
[email or web address]. [email or web address]

## 2021-03-02 NOTE — PROGRESS NOTES
Hospitalist Progress Note NAME: Fernanda Carpio :  1960 MRN:  [id number] Assessment / Plan: 
Acute on chronic diastolic HF poa  
-Last echo shows ejection fraction of 55% 
-Weight is down by 20 pounds 
-Continue Bumex 1 mg IV twice daily. Continue Coreg. Check BMP in a.m. 
-Cardiology is following PAF/ History of SSS s/p ablation and leadless PM insertion HTN  
-Continue Coreg 
- continue coumadin, INR sub therapeutic 1.2. Check INR in a.m. Pharmacy is managing. 
-Cardiology is following CKD 4 
-Baseline creatinine is around 1.9-2.0. 
-Currently creatinine is close to baseline. Diabetes mellitus: Continue sliding scale insulin and diabetic diet Anxiety: cont home meds Left BKA 2020 Body mass index is 42.83 kg/m².: 40 or above Morbid obesity Code Status: Full code Surrogate Decision Maker:  DVT Prophylaxis: Already on Coumadin Baseline: independent  lives with her , ambulates with prosthesis Recommended Disposition:  Home pending clinical progress Subjective: Chief Complaint / Reason for Physician Visit: FU HF Seen in ED Mild to moderate dyspnea with conversation Not significant improvement since admission No hypoxic 
 no cp Discussed with RN events overnight. Review of Systems: 
Symptom Y/N Comments  Symptom Y/N Comments Fever/Chills n   Chest Pain n   
Poor Appetite    Edema Cough    Abdominal Pain Sputum    Joint Pain SOB/CHOW y   Pruritis/Rash Nausea/vomit    Tolerating PT/OT Diarrhea    Tolerating Diet Constipation    Other Could NOT obtain due to:   
 
Objective: VITALS:  
Last 24hrs VS reviewed since prior progress note. Most recent are: 
Patient Vitals for the past 24 hrs: 
 Temp Pulse Resp BP SpO2  
21 1430 97.9 °F (36.6 °C) 90 16 118/62 95 % 21 1253 97.4 °F (36.3 °C) 92 18 108/63 94 % 21 1118     92 % 03/02/21 0835 97.4 °F (36.3 °C) 92 20 (!) 112/59 96 % 03/02/21 0607 97.5 °F (36.4 °C) 90 16 121/65 93 % 03/02/21 0036 97.8 °F (36.6 °C) 91 18 133/73 93 % 03/01/21 1926 97.5 °F (36.4 °C) 87 18 115/67 91 % Intake/Output Summary (Last 24 hours) at 3/2/2021 1538 Last data filed at 3/1/2021 7322 Gross per 24 hour Intake 220 ml Output 550 ml Net -330 ml I had a face to face encounter and independently examined this patient on 3/2/2021, as outlined below: PHYSICAL EXAM: 
General: WD, WN. Alert, cooperative, no acute distress, mild to moderate dyspnea at rest    
EENT:  EOMI. Anicteric sclerae. MMM Resp:  diminished BS bilaterally, no wheezing or rales. No accessory muscle use CV:  Regular  rhythm,  No edema GI:  Soft, Non distended, Non tender. +Bowel sounds Neurologic:  Alert and oriented X 3, normal speech, Psych:   Good insight. Not anxious nor agitated Skin:  No rashes. No jaundice Reviewed most current lab test results and cultures  YES Reviewed most current radiology test results   YES Review and summation of old records today    NO Reviewed patient's current orders and MAR    YES 
PMH/SH reviewed - no change compared to H&P 
________________________________________________________________________ Care Plan discussed with: 
  Comments Patient y Family RN y   
Care Manager Consultant Multidiciplinary team rounds were held today with , nursing, pharmacist and clinical coordinator. Patient's plan of care was discussed; medications were reviewed and discharge planning was addressed. ________________________________________________________________________ Total NON critical care TIME:  35  Minutes Total CRITICAL CARE TIME Spent:   Minutes non procedure based Comments >50% of visit spent in counseling and coordination of care y   
________________________________________________________________________ Jessica Perry MD  
 
Procedures: see electronic medical records for all procedures/Xrays and details which were not copied into this note but were reviewed prior to creation of Plan. LABS: 
I reviewed today's most current labs and imaging studies. Pertinent labs include: 
Recent Labs 03/01/21 
0419 02/28/21 
1819 WBC 5.3 6.3 HGB 11.0* 12.5 HCT 37.7 43.8  278 Recent Labs 03/02/21 
1242 03/01/21 
0286 03/01/21 
0950 02/28/21 
1848 NA  --   --  142 142 K  --   --  4.0 4.0  
CL  --   --  115* 113* CO2  --   --  19* 19* GLU  --   --  155* 180* BUN  --   --  22* 22* CREA  --   --  1.96* 1.97* CA  --   --  8.6 8.8 ALB  --   --  3.0* 3.4* TBILI  --   --  1.6* 0.8 ALT  --   --  11* 14 INR 1.2* 1.2*  --   --   
 
 
Signed: Jessica Perry MD

## 2021-03-02 NOTE — PROGRESS NOTES
[]Hide copied text []Ronny for details 
  
     
[address], [place], [address] [facility]  [phone] 
  
  
Cardiology Progress Note 
  
  
3/2/2021 11:48 AM 
  
Admit Date: 2/28/2021 
  
Admit Diagnosis:  
Acute respiratory failure ([address] [J96.00] 
  
Subjective:  
  
Erick Pandey has no c/o CP, SOB. WT stable. Unmeasurable output, but patient laying flat in bed without difficulty. Continues on Bumex BID 
  
Visit Vitals BP (!) 112/59 (BP 1 Location: Left arm, BP Patient Position: At rest;Supine) Pulse 92 Temp 97.4 °F (36.3 °C) Resp 20 Ht 5' 10\" (1.778 m) Wt 278 lb 10.6 oz (126.4 kg) SpO2 92% BMI 39.98 kg/m²  
  
  
      
Current Facility-Administered Medications Medication Dose Route Frequency  warfarin (COUMADIN) tablet 4 mg  4 mg Oral ONCE  pantoprazole (PROTONIX) tablet 40 mg  40 mg Oral ACB&D  
 sodium chloride (NS) flush 5-40 mL  5-40 mL IntraVENous Q8H  
 sodium chloride (NS) flush 5-40 mL  5-40 mL IntraVENous PRN  
 acetaminophen (TYLENOL) tablet 650 mg  650 mg Oral Q6H PRN  
  Or  acetaminophen (TYLENOL) suppository 650 mg  650 mg Rectal Q6H PRN  polyethylene glycol (MIRALAX) packet 17 g  17 g Oral DAILY PRN  promethazine (PHENERGAN) tablet 12.5 mg  12.5 mg Oral Q6H PRN  
  Or  ondansetron (ZOFRAN) injection 4 mg  4 mg IntraVENous Q6H PRN  
 oxyCODONE IR (ROXICODONE) tablet 5 mg  5 mg Oral Q4H PRN  
 oxyCODONE IR (ROXICODONE) tablet 10 mg  10 mg Oral Q4H PRN  
 albuterol-ipratropium (DUO-NEB) 2.5 MG-0.5 MG/3 ML  3 mL Nebulization Q6H PRN  
 WARFARIN INFORMATION NOTE (COUMADIN)   Other QPM  
 bumetanide (BUMEX) injection 2 mg  2 mg IntraVENous BID  sertraline (ZOLOFT) tablet 125 mg  125 mg Oral DAILY  busPIRone (BUSPAR) tablet 10 mg  10 mg Oral BID  carvediloL (COREG) tablet 12.5 mg  12.5 mg Oral BID WITH MEALS  
 hydrALAZINE (APRESOLINE) 20 mg/mL injection 10 mg  10 mg IntraVENous Q2H PRN Objective:  
  
Physical Exam: General Appearance:  morbidly obese older appearing than stated age,  female in no acute distress Chest:   Clear Cardiovascular:  Regular rate and rhythm, no murmur.  
Abdomen:   Soft, non-tender, bowel sounds are active.  
Extremities: L BKA, no LE edema Skin:     Warm and dry.  
  
Data Review:  
    
Recent Labs 03/01/21 
0419 02/28/21 
1819 WBC 5.3 6.3 HGB 11.0* 12.5 HCT 37.7 43.8  278  
  
      
Recent Labs 03/02/21 
3148 03/01/21 
5413 03/01/21 
6053 02/28/21 
1848 NA  --   --  142 142 K  --   --  4.0 4.0  
CL  --   --  115* 113* CO2  --   --  19* 19* GLU  --   --  155* 180* BUN  --   --  22* 22* CREA  --   --  1.96* 1.97* CA  --   --  8.6 8.8 ALB  --   --  3.0* 3.4* TBILI  --   --  1.6* 0.8 ALT  --   --  11* 14 INR 1.2* 1.2*  --   --   
  
  
    
Recent Labs  
  02/28/21 
2305 02/28/21 
1848 TROIQ <0.05 <0.05  
  
  
  
Intake/Output Summary (Last 24 hours) at 3/2/2021 1148 Last data filed at 3/1/2021 3971 Gross per 24 hour Intake 220 ml Output 550 ml Net -330 ml  
  
  
Telemetry: SR 
  
  
Assessment:  
  
Principal Problem: 
  Acute respiratory failure ([facility] (12/20/2020) 
  Active Problems: S/P atrioventricular rolando ablation (11/17/2020) 
  
  S/P cardiac pacemaker procedure (4/3/2012) Overview: 4/2/12 Medtronic dual chamber pacemaker implant Pacer removed April 2020 due vegetations related to diabetic foot  
    osteomyelitis.  
     
  
  Mixed hyperlipidemia (5/22/2012) 
  
  CKD (chronic kidney disease), stage IV ([facility] (6/9/2012) Overview: Acute on chronic, Dr. Summer Cabrera hypertension (8/26/2016) 
  Morbid obesity with BMI of 40.0-44.9, adult ([facility] (5/1/2017)   
  
  
Plan:  
  
Acute on chronic diastolic HF/HFpEF: (EF 42-02%) Not sure what caused exacerbation, diet? CKD? Continue on Bumex to 2mg BID Monitor I/Os, daily weights on stand up scale and labs.   unmeasurable outputs, and no input recorded Asked RNs to place signs on doors.  If weights are correct weight is drastically up Discussed compliance with daily weights, diet Nephrology following 
  
PAF with AV rolando ablation/Medtronic pacemaker: 
Warfarin continues, INR has not been therapeutic, consider changing to NOAC 
  
Chronic CKD: 
Nephrology to be consulted 
  
HTN: 
Controlled with current therapy 
  
[facility] Late Signature for Service Provided on 3/2/2021 Patient seen and examined by me with the above nurse practitioner. I personally performed all components of the history, physical, and medical decision making and agree with the assessment and plan with minor modifications as noted. Patient is diuresing well. Weight is far above baseline weight. Counseled on daily weights and recording of ins and outs. Counseled on CHF instructions especially weight monitoring at home.

## 2021-03-02 NOTE — PROGRESS NOTES
End of Shift Note Bedside shift change report given to Bridgette Saha (oncoming nurse) by Gianfranco Em RN (offgoing nurse). Report included the following information SBAR Shift worked:  7-3 Shift summary and any significant changes:  
  none Concerns for physician to address:    
Zone phone for oncoming shift:     
 
Patient Information Hellen Chicas 63 y.o. 
2/28/2021  5:20 PM by Mario Real MD. Hellen Chicas was admitted from Home 
 
Problem List 
Patient Active Problem List  
 Diagnosis Date Noted  S/P atrioventricular rolando ablation 11/17/2020 Priority: 1 - One  Gastroesophageal reflux disease without esophagitis 12/29/2020  Acute respiratory failure ([address] 12/20/2020  UTI (urinary tract infection) 12/20/2020  Pneumonia 12/20/2020  
 SOB (shortness of breath) 12/20/2020  CKD (chronic kidney disease) 12/20/2020  Anticoagulated 12/20/2020  PARAG (acute kidney injury) ([address] 12/20/2020  Fluid overload 12/20/2020  Subtherapeutic international normalized ratio (INR) 12/20/2020  Suspected COVID-19 virus infection 12/20/2020  Chest pain 11/23/2020  Left below-knee amputee ([address] 06/11/2020  H/O bilateral mastectomy 03/26/2020  Foot deformity, right 09/24/2019  Pes cavus 09/24/2019  Diabetic polyneuropathy associated with type 2 diabetes mellitus ([address] 11/13/2018  Morbid obesity with BMI of 40.0-44.9, adult ([address] 05/01/2017  Controlled type 2 diabetes mellitus with stage 4 chronic kidney disease, with long-term current use of insulin ([address] 01/16/2017  PAF (paroxysmal atrial fibrillation) ([address] 11/28/2016  Anemia in stage 4 chronic kidney disease ([address] 11/28/2016  Essential hypertension 08/26/2016  Systolic murmur 84/45/4879  CKD (chronic kidney disease), stage IV ([address] 06/09/2012  Mixed hyperlipidemia 05/22/2012  Long term (current) use of anticoagulants 04/11/2012  S/P cardiac pacemaker procedure 04/03/2012  Sick sinus syndrome ([facility] 04/02/2012  Status post ablation of atrial fibrillation 12/15/2011  Fe deficiency anemia 04/14/2010  
 History of breast cancer 04/13/2010  Asthma 04/13/2010 Past Medical History:  
Diagnosis Date  Acquired lymphedema Right arm  Acute respiratory failure ([address] 12/19/2020  Arrhythmia  Asthma  Atrial fibrillation ([facility] Dr. Sierra Mata and Dr. Darryl Puckett [facility])  Cancer ([address] bilateral breast  
 Chronic kidney disease  Diabetes mellitus type II   
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 
CHF:Yes Yes PNA:No No 
 
Activity: 
Activity Level: Bed Rest 
Number times ambulated in hallways past shift: 0 Number of times OOB to chair past shift: 0 Cardiac:  
Cardiac Monitoring: Yes     
Cardiac Rhythm: Paced Access:  
Current line(s): PIV Genitourinary:  
Urinary status: incontinent external catheter Respiratory:  
O2 Device: Room air Chronic home O2 use?: NO Incentive spirometer at bedside: NO 
  
 
GI: 
Last Bowel Movement Date: 03/01/21 Current diet:  DIET CARDIAC Regular; FR 1200ML Passing flatus: YES Tolerating current diet: YES Pain Management:  
Patient states pain is manageable on current regimen: YES Skin: 
Valdemar Score: 18 Interventions: turn team 
 
Patient Safety: 
Fall Score: Total Score: 3 Interventions: bed/chair alarm High Fall Risk: Yes DVT prophylaxis: DVT prophylaxis Med- No Wafarin DVT prophylaxis SCD or LINA- No  
 
Wounds: (If Applicable) Wounds- Yes Location callous type ulcer on right great and second toe and under foot,scars on sacrum and top of thigh that pt states are form wounds in past 
 
Active Consults: 
IP CONSULT TO CARDIOLOGY 
IP CONSULT TO HOSPITALIST Length of Stay: 
Expected LOS: 4d 0h 
Actual LOS: 2 Discharge Plan: Yes KATARINA Cobb RN

## 2021-03-02 NOTE — PROGRESS NOTES
End of Shift Note Bedside shift change report given to  (oncoming nurse) by Shanel Walters RN (offgoing nurse). Report included the following information SBAR, Recent Results and Med Rec Status Shift worked:  Night shift Shift summary and any significant changes: No new onset Concerns for physician to address:  No new onset Zone phone for oncoming shift:     
 
Patient Information Leisa Camarillo 63 y.o. 
2/28/2021  5:20 PM by eFlix Schafer MD. Leisa Camarillo was admitted from Home 
 
Problem List 
Patient Active Problem List  
 Diagnosis Date Noted  S/P atrioventricular rolando ablation 11/17/2020 Priority: 1 - One  Gastroesophageal reflux disease without esophagitis 12/29/2020  Acute respiratory failure ([address] 12/20/2020  UTI (urinary tract infection) 12/20/2020  Pneumonia 12/20/2020  
 SOB (shortness of breath) 12/20/2020  CKD (chronic kidney disease) 12/20/2020  Anticoagulated 12/20/2020  PARAG (acute kidney injury) ([address] 12/20/2020  Fluid overload 12/20/2020  Subtherapeutic international normalized ratio (INR) 12/20/2020  Suspected COVID-19 virus infection 12/20/2020  Chest pain 11/23/2020  Left below-knee amputee ([address] 06/11/2020  H/O bilateral mastectomy 03/26/2020  Foot deformity, right 09/24/2019  Pes cavus 09/24/2019  Diabetic polyneuropathy associated with type 2 diabetes mellitus ([address] 11/13/2018  Morbid obesity with BMI of 40.0-44.9, adult ([address] 05/01/2017  Controlled type 2 diabetes mellitus with stage 4 chronic kidney disease, with long-term current use of insulin ([address] 01/16/2017  PAF (paroxysmal atrial fibrillation) ([address] 11/28/2016  Anemia in stage 4 chronic kidney disease ([address] 11/28/2016  Essential hypertension 08/26/2016  Systolic murmur 97/90/1589  CKD (chronic kidney disease), stage IV ([address] 06/09/2012  Mixed hyperlipidemia 05/22/2012  Long term (current) use of anticoagulants 04/11/2012  S/P cardiac pacemaker procedure 04/03/2012  Sick sinus syndrome ([facility] 04/02/2012  Status post ablation of atrial fibrillation 12/15/2011  Fe deficiency anemia 04/14/2010  
 History of breast cancer 04/13/2010  Asthma 04/13/2010 Past Medical History:  
Diagnosis Date  Acquired lymphedema Right arm  Acute respiratory failure ([facility] 12/19/2020  Arrhythmia  Asthma  Atrial fibrillation ([facility] Dr. Jennifer Hammer and Dr. Mustapha Carrionoked flutter [facility])  Cancer ([facility] bilateral breast  
 Chronic kidney disease  Diabetes mellitus type II   
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 
Activity Level: Bed Rest 
Number times ambulated in hallways past shift: 0 Number of times OOB to chair past shift: 0 Cardiac:  
Cardiac Monitoring: Yes     
Cardiac Rhythm: Paced Access:  
Current line(s): PIV Central Line? No Placement date  Reason Medically Necessary PICC LINE? No Placement date Reason Medically Necessary Genitourinary:  
Urinary status: voiding Urinary Catheter? No Placement Date  Reason Medically Necessary Respiratory:  
O2 Device: Room air Chronic home O2 use?: NO Incentive spirometer at bedside: NO 
  
 
GI: 
Last Bowel Movement Date: 03/01/21 Current diet:  DIET CARDIAC Regular; FR 1200ML Passing flatus: YES Tolerating current diet: YES 
 Pain Management:  
Patient states pain is manageable on current regimen: YES Skin: 
Valdemar Score: 18 Interventions: PT/OT consult Patient Safety: 
Fall Score: Total Score: 2 Interventions: bed/chair alarm High Fall Risk: Yes DVT prophylaxis: DVT prophylaxis Med- Yes DVT prophylaxis SCD or LINA- No  
 
Wounds: (If Applicable) Wounds- Yes Location Buttocts and toes Active Consults: 
IP CONSULT TO CARDIOLOGY 
IP CONSULT TO HOSPITALIST Length of Stay: 
Expected LOS: 4d 0h 
Actual LOS: 2 Discharge Plan: Yes Pt is SOB with exertion, lung sounds heard wheezing. She denies chest pain. Ulcers noted on the 1st and 2nd toes of the right foot, as well as redness on her sarum and a scar from an old stage 1 that healed. CHG bath along with incontinence care provided, pr was able to tolerate the care.  
 
 
Candelaria An RN

## 2021-03-03 NOTE — PROGRESS NOTES
End of Shift Note 
  Bedside shift change report given to edward (oncoming nurse) by Kamala Pimentel RN (offgoing nurse). Report included the following information SBAR 
  
Shift worked:  night Shift summary and any significant changes:  
   none 
  
   
Concerns for physician to address:    
Zone phone for oncoming shift:     
  
Patient Information Epi Bautista 63 y.o. 
2/28/2021  5:20 PM by Rigoberto Morris MD. Epi Bautista was admitted from Home 
  
Problem List 
     
Patient Active Problem List  
  Diagnosis Date Noted  S/P atrioventricular rolando ablation 11/17/2020  
    Priority: 1 - One  Gastroesophageal reflux disease without esophagitis 12/29/2020  Acute respiratory failure ([address] 12/20/2020  UTI (urinary tract infection) 12/20/2020  Pneumonia 12/20/2020  
 SOB (shortness of breath) 12/20/2020  CKD (chronic kidney disease) 12/20/2020  Anticoagulated 12/20/2020  PARAG (acute kidney injury) ([address] 12/20/2020  Fluid overload 12/20/2020  Subtherapeutic international normalized ratio (INR) 12/20/2020  Suspected COVID-19 virus infection 12/20/2020  Chest pain 11/23/2020  Left below-knee amputee ([address] 06/11/2020  H/O bilateral mastectomy 03/26/2020  Foot deformity, right 09/24/2019  Pes cavus 09/24/2019  Diabetic polyneuropathy associated with type 2 diabetes mellitus ([address] 11/13/2018  Morbid obesity with BMI of 40.0-44.9, adult ([address] 05/01/2017  Controlled type 2 diabetes mellitus with stage 4 chronic kidney disease, with long-term current use of insulin ([address] 01/16/2017  PAF (paroxysmal atrial fibrillation) ([address] 11/28/2016  Anemia in stage 4 chronic kidney disease ([address] 11/28/2016  Essential hypertension 08/26/2016  Systolic murmur 35/11/2345  CKD (chronic kidney disease), stage IV ([address] 06/09/2012  Mixed hyperlipidemia 05/22/2012  Long term (current) use of anticoagulants 04/11/2012  S/P cardiac pacemaker procedure 04/03/2012  Sick sinus syndrome ([facility] 04/02/2012  Status post ablation of atrial fibrillation 12/15/2011  Fe deficiency anemia 04/14/2010  
 History of breast cancer 04/13/2010  Asthma 04/13/2010  
  
    
Past Medical History:  
Diagnosis Date  Acquired lymphedema    
  Right arm  Acute respiratory failure ([address] 12/19/2020  Arrhythmia    
 Asthma    
 Atrial fibrillation [facility])    
  Dr. Katy Barajas and Dr. Bossman Wade Atrial flutter [facility])    
 Cancer [facility])    
  bilateral breast  
 Chronic kidney disease    
 Diabetes mellitus type II    
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    
 Essential hypertension    
 Hyperlipidemia    
 Hypertension    
 Long term (current) use of anticoagulants    
 Morbid obesity ([address] 3/14/2012  Nausea & vomiting    
 Neuropathy    
 Osteoarthritis    
 Pacemaker    
 Sick sinus syndrome (HCC)    
 Type 2 diabetes mellitus with left diabetic foot infection ([address] 10/29/2019  
  
  
Core Measures: CVA: No No 
CHF:Yes Yes PNA:No No 
  
Activity: 
Activity Level: Bed Rest 
Number times ambulated in hallways past shift: 0 Number of times OOB to chair past shift: 0 
  
Cardiac:  
Cardiac Monitoring: Yes     
Cardiac Rhythm: Paced 
  
Access:  
Current line(s): PIV  
  
  
Genitourinary:  
Urinary status: incontinent external catheter 
  
  
Respiratory:  
O2 Device: Room air Chronic home O2 use?: NO Incentive spirometer at bedside: NO 
  
GI: 
Last Bowel Movement Date: 03/01/21 Current diet:  DIET CARDIAC Regular; FR 1200ML Passing flatus: YES Tolerating current diet: YES 
  
Pain Management:  
Patient states pain is manageable on current regimen: YES 
  
Skin: 
Valdemar Score: 18 Interventions: turn team 
 
Patient Safety: 
Fall Score: Total Score: 3 Interventions: bed/chair alarm High Fall Risk: Yes 
  
DVT prophylaxis: DVT prophylaxis Med- No Wafarin DVT prophylaxis SCD or LINA- No  
  
Wounds: (If Applicable) Wounds- Yes Location callous type ulcer on right great and second toe and under foot,scars on sacrum and top of thigh that pt states are form wounds in past 
  
Active Consults: 
IP CONSULT TO CARDIOLOGY 
IP CONSULT TO HOSPITALIST 
  
Length of Stay: 
Expected LOS: 4d 0h 
Actual LOS: 2 Discharge Plan: Yes KATARINA Ny RN

## 2021-03-03 NOTE — PROGRESS NOTES
Problem: Self Care Deficits Care Plan (Adult) Goal: *Acute Goals and Plan of Care (Insert Text) Description: FUNCTIONAL STATUS PRIOR TO ADMISSION: Patient was modified independent using a rolling walker for functional mobility. Was bathing seated on tub transfer bench and perform all ADLs without assist.  Cooks. When prothesis does not fit pt slide board transfers from wheelchair on her own. 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 Occupational Therapy Goals: 
Initiated 3/3/2021 1. Patient will perform grooming seated without back support and independence within 7 days. 2. Patient will perform lower body dressing to include prosthesis with supervision within 7 days. 3. Patient will perform toileting with supervision within 7 days. 4. Patient will transfer from bedside commode with supervision using the least restrictive device and appropriate durable medical equipment within 7 days. Outcome: Not Met OCCUPATIONAL THERAPY EVALUATION Patient: Kirsten Kumar (83 y.o. female) Date: 3/3/2021 Primary Diagnosis: Acute respiratory failure ([facility] [J96.00] Precautions:  Fall, Contact(L BKA, ESBL) ASSESSMENT Based on the objective data described below, the patient presents with SOB on exertion, fluid overload and pt was requesting to get OOB to use the restroom. Pt was unable to don left prosthesis due to swelling in residual limb. Moderate assist x2 needed for supine to sit and fair dynamic seated balance. Pt was unable to scoot or attempt sit to stand today. She reports normally if her prosthesis does not fit that she uses sliding board to transfer to and from her wheelchair. No drop arm commode present nor sliding board. Pt was able to don and doff sleeve for prosthesis seated EOB with increased time. Pt needed to return to bed and total assist needed for toileting at bed level. Pt is far from her modified independent baseline and recommend IPR at this time. Current Level of Function Impacting Discharge (ADLs/self-care):  moderate assist x2 bed mobility Feeding: Independent Oral Facial Hygiene/Grooming: Contact guard assistance(seated EOB) Bathing: Maximum assistance Upper Body Dressing: Setup Lower Body Dressing: Maximum assistance Toileting: Total assistance Functional Outcome Measure: The patient scored 40/100 on the barthel outcome measure which is indicative of . Other factors to consider for discharge: see above Patient will benefit from skilled therapy intervention to address the above noted impairments. PLAN : 
Recommendations and Planned Interventions: self care training, functional mobility training, therapeutic exercise, balance training, therapeutic activities, patient education, home safety training, and family training/education Frequency/Duration: Patient will be followed by occupational therapy 5 times a week to address goals. Recommendation for discharge: (in order for the patient to meet his/her long term goals) Therapy 3 hours per day 5-7 days per week This discharge recommendation: Has been made in collaboration with the attending provider and/or case management IF patient discharges home will need the following DME: none SUBJECTIVE:  
Patient stated I can't get this leg on. It is too swollen.  OBJECTIVE DATA SUMMARY:  
HISTORY:  
Past Medical History:  
Diagnosis Date Acquired lymphedema Right arm Acute respiratory failure ([address] 12/19/2020 Arrhythmia Asthma Atrial fibrillation ([facility] Dr. Brigette Hart and Dr. Elma Wild  
 Atrial flutter [facility]) Cancer ([facility] bilateral breast  
 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  
Procedure Laterality Date HX AFIB ABLATION    
 HX AMPUTATION FOOT Left 04/2020 HX BREAST RECONSTRUCTION Bilateral   
 HX CARPAL TUNNEL RELEASE    
 HX CERVICAL FUSION  1994 [address] [facility] [address] RIGHT MODIFIED RADICAL   
 HX MASTECTOMY Left   
 no lymphnode removal  
 HX MOHS PROCEDURES  1995  
 right HX ORTHOPAEDIC Right   
 knee surgery HX PACEMAKER  11/17/2020 Dr. Blair Tavarez. Insertion of permanent pacemaker. AV node ablation HX PACEMAKER    
 IR INSERT TUNL CVC W PORT OVER 5 YEARS    
 IR INSERT TUNL CVC W/O PORT OVER 5 YR  5/4/2020 IR INSERT TUNL CVC W/O PORT OVER 5 YR  9/8/2020 IR REMOVE TUNL CVAD W/O PORT / PUMP  6/26/2020 IR REMOVE TUNL CVAD W/O PORT / PUMP  10/19/2020  FL ABDOMEN SURGERY PROC UNLISTED    
 gastric bypass [facility] AND 1994 IN GASTRIC BYPASS,OBESE<150CM SAW-[id number]  7/08  
 [facility] IN ICAR CATHETER ABLATION ATRIOVENTR NODE FUNCTION N/A 11/17/2020 ABLATION AV NODE performed by Elena Camarena MD at [address] IN NEEDLE BIOPSY LIVER,W OTHR PROC  8.21.07  
 IN RELOCATION OF SKIN POCKET FOR PACEMAKER N/A 4/24/2020 RELOCATE [address] performed by Tylor Reynaga MD at [facility] CARDIAC CATH LAB  
 IN RMVL TRANSVNS PM ELTRD 1 LEAD SYS ATR/VENTR N/A 4/24/2020 Remove Pacemaker Dual Leads performed by Tylor Reynaga MD at [facility] CARDIAC CATH LAB  
 IN RMVL TRANSVNS PM ELTRD 1 LEAD SYS ATR/VENTR N/A 4/27/2020 REMOVE PACEMAKER DUAL LEADS performed by Tylor Reynaga MD at [facility] CARDIAC CATH LAB  
 IN TCAT INSJ/RPL PERM LEADLESS PACEMAKER RV W/IMG N/A 11/17/2020 INSERT OR REPLACE TRANSCATH PPM LEADLESS performed by Elena Camarena MD at [place] [address], [facility] CATH LAB  
 IN TRABECULOPLASTY BY LASER SURGERY    
 US GUIDE ASP OVARIAN CYST ABD APPROACH  8.21.07  
 RIGHT Expanded or extensive additional review of patient history:  
 
Home Situation Home Environment: Private residence Wheelchair Ramp: Yes One/Two Story Residence: One story Living Alone: No 
Support Systems: Spouse/Significant Other/Partner Patient Expects to be Discharged to[de-identified] Private residence Current DME Used/Available at Home: Walker, rolling, Wheelchair, Shower chair, Grab bars Tub or Shower Type: Shower Hand dominance: Right EXAMINATION OF PERFORMANCE DEFICITS: 
Cognitive/Behavioral Status: 
  
  
Cognition: Appropriate decision making; Appropriate for age attention/concentration; Appropriate safety awareness Perception: Appears intact Perseveration: No perseveration noted Safety/Judgement: Awareness of environment; Fall prevention;Home safety; Insight into deficits Edema: severe abdomen and BLE Hearing: Auditory Auditory Impairment: None Vision/Perceptual:   
Tracking: Able to track stimulus in all quadrants w/o difficulty Acuity: Within Defined Limits Range of Motion: 
 
AROM: Generally decreased, functional 
  
  
  
  
  
  
  
 
Strength: 
 
Strength: Generally decreased, functional 
  
  
  
  
 
Coordination: 
  
Fine Motor Skills-Upper: Left Intact; Right Intact Gross Motor Skills-Upper: Left Intact; Right Intact Tone & Sensation: 
 
  
Sensation: Impaired(R foot plantar) Balance: 
Sitting: Impaired Sitting - Static: Good (unsupported) Sitting - Dynamic: Fair (occasional) Standing: (NT) Functional Mobility and Transfers for ADLs: 
Bed Mobility: 
Rolling: Moderate assistance Supine to Sit: Moderate assistance Sit to Supine: Assist x2; Moderate assistance Scooting: Moderate assistance;Assist x2 Transfers: 
Sit to Stand: (NT--unable to don prosthesis 2/2 edema) Stand to Sit: (unable today, unable to don prosthesis) Bed to Chair: (unable today) Toilet Transfer : (unable today) ADL Assessment: 
Feeding: Independent Oral Facial Hygiene/Grooming: Contact guard assistance(seated EOB) Bathing: Maximum assistance Upper Body Dressing: Setup Lower Body Dressing: Maximum assistance Toileting: Total assistance ADL Intervention and task modifications: 
  
See assessment Cognitive Retraining Safety/Judgement: Awareness of environment; Fall prevention;Home safety; Insight into deficits Functional Measure: 
Barthel Index: 
 
Bathin Bladder: 10 Bowels: 10 
Groomin Dressin Feeding: 10 Mobility: 0 Stairs: 0 Toilet Use: 0 Transfer (Bed to Chair and Back): 0 Total: 40/100 The Barthel ADL Index: Guidelines 1. The index should be used as a record of what a patient does, not as a record of what a patient could do. 2. The main aim is to establish degree of independence from any help, physical or verbal, however minor and for whatever reason. 3. The need for supervision renders the patient not independent. 4. A patient's performance should be established using the best available evidence. Asking the patient, friends/relatives and nurses are the usual sources, but direct observation and common sense are also important. However direct testing is not needed. 5. Usually the patient's performance over the preceding 24-48 hours is important, but occasionally longer periods will be relevant. 6. Middle categories imply that the patient supplies over 50 per cent of the effort. 7. Use of aids to be independent is allowed. Lolis Drake., Barthel, D.W. (4744). Functional evaluation: the Barthel Index. [address] [facility] (14)2. LIZY Rai, Joshua Monson., Porsha Lopez., [place], [address] [place] [address] (1999). Measuring the change indisability after inpatient rehabilitation; comparison of the responsiveness of the Barthel Index and Functional Giles Measure. Journal of Neurology, Neurosurgery, and Psychiatry, 66(4), 376-023. Venkata Hoover, N.J.A, BLAISE Rosa, & Saba Argueta, REYESA. (2004.) Assessment of post-stroke quality of life in cost-effectiveness studies: The usefulness of the Barthel Index and the EuroQoL-5D. [facility], 13, 457-72 Occupational Therapy Evaluation Charge Determination History Examination Decision-Making MEDIUM Complexity : Expanded review of history including physical, cognitive and psychosocial  history  MEDIUM Complexity : 3-5 performance deficits relating to physical, cognitive , or psychosocial skils that result in activity limitations and / or participation restrictions MEDIUM Complexity : Patient may present with comorbidities that affect occupational performnce. Miniml to moderate modification of tasks or assistance (eg, physical or verbal ) with assesment(s) is necessary to enable patient to complete evaluation Based on the above components, the patient evaluation is determined to be of the following complexity level: MEDIUM Pain Ratin/10 Activity Tolerance:  
Fair, requires rest breaks, and observed SOB with activity After treatment patient left in no apparent distress:   
Supine in bed and Call bell within reach COMMUNICATION/EDUCATION:  
The patients plan of care was discussed with: Physical therapist, Registered nurse, and patient . Patient/family have participated as able in goal setting and plan of care. and Patient/family agree to work toward stated goals and plan of care. This patients plan of care is appropriate for delegation to [facility]. Thank you for this referral. 
Adrienne Castillo OTR/L Time Calculation: 26 mins

## 2021-03-03 NOTE — PROGRESS NOTES
[address], [address] [facility]  [phone] Cardiology Progress Note 3/3/2021 11:48 AM 
 
Admit Date: 2/28/2021 Admit Diagnosis:  
Acute respiratory failure ([address] [J96.00] Subjective:  
 
Graham Lund has no c/o CP, SOB. WT stable 278#s, taken on stand up scale with prothesis on. Unmeasurable output. Weight in outpt setting anywhere from 236 - 264, and on admission it is recorded at 298#s. Continues on Bumex, and inaccurate I/Os Visit Vitals /69 Pulse 91 Temp 97.9 °F (36.6 °C) Resp 18 Ht 5' 10\" (1.778 m) Wt 278 lb (126.1 kg) SpO2 96% BMI 39.89 kg/m² Current Facility-Administered Medications Medication Dose Route Frequency  warfarin (COUMADIN) tablet 4 mg  4 mg Oral ONCE  pantoprazole (PROTONIX) tablet 40 mg  40 mg Oral ACB&D  
 sodium chloride (NS) flush 5-40 mL  5-40 mL IntraVENous Q8H  
 sodium chloride (NS) flush 5-40 mL  5-40 mL IntraVENous PRN  
 acetaminophen (TYLENOL) tablet 650 mg  650 mg Oral Q6H PRN Or  
 acetaminophen (TYLENOL) suppository 650 mg  650 mg Rectal Q6H PRN  polyethylene glycol (MIRALAX) packet 17 g  17 g Oral DAILY PRN  promethazine (PHENERGAN) tablet 12.5 mg  12.5 mg Oral Q6H PRN Or  
 ondansetron (ZOFRAN) injection 4 mg  4 mg IntraVENous Q6H PRN  
 oxyCODONE IR (ROXICODONE) tablet 5 mg  5 mg Oral Q4H PRN  
 oxyCODONE IR (ROXICODONE) tablet 10 mg  10 mg Oral Q4H PRN  
 albuterol-ipratropium (DUO-NEB) 2.5 MG-0.5 MG/3 ML  3 mL Nebulization Q6H PRN  
 WARFARIN INFORMATION NOTE (COUMADIN)   Other QPM  
 bumetanide (BUMEX) injection 2 mg  2 mg IntraVENous BID  sertraline (ZOLOFT) tablet 125 mg  125 mg Oral DAILY  busPIRone (BUSPAR) tablet 10 mg  10 mg Oral BID  carvediloL (COREG) tablet 12.5 mg  12.5 mg Oral BID WITH MEALS  
 hydrALAZINE (APRESOLINE) 20 mg/mL injection 10 mg  10 mg IntraVENous Q2H PRN Objective:  
  
Physical Exam: General Appearance:  morbidly obese older appearing than stated age,  female in no acute distress Chest:   Clear Cardiovascular:  Regular rate and rhythm, no murmur. Abdomen:   Soft, non-tender, bowel sounds are active. Extremities: L BKA, no LE edema Skin:  Warm and dry. Data Review:  
Recent Labs 03/03/21 
1580 03/01/21 
0419 02/28/21 
1819 WBC 6.2 5.3 6.3 HGB 10.5* 11.0* 12.5 HCT 37.8 37.7 43.8  239 278 Recent Labs 03/03/21 
1729 03/02/21 
3293 03/01/21 
8240 03/01/21 
7571 02/28/21 
1848   --   --  142 142  
K 4.7  --   --  4.0 4.0  
*  --   --  115* 113* CO2 20*  --   --  19* 19* *  --   --  155* 180* BUN 26*  --   --  22* 22* CREA 2.12*  --   --  1.96* 1.97* CA 8.7  --   --  8.6 8.8 ALB  --   --   --  3.0* 3.4* TBILI  --   --   --  1.6* 0.8 ALT  --   --   --  11* 14 INR 1.2* 1.2* 1.2*  --   --   
 
 
Recent Labs  
  02/28/21 
2305 02/28/21 
1848 TROIQ <0.05 <0.05 Intake/Output Summary (Last 24 hours) at 3/3/2021 1315 Last data filed at 3/3/2021 1017 Gross per 24 hour Intake 290 ml Output  Net 290 ml Telemetry: SR 
 
 
Assessment:  
 
Principal Problem: 
  Acute respiratory failure ([facility] [address] (12/20/2020) Active Problems: S/P atrioventricular rolando ablation (11/17/2020) S/P cardiac pacemaker procedure (4/3/2012) Overview: 4/2/12 Medtronic dual chamber pacemaker implant Pacer removed April 2020 due vegetations related to diabetic foot  
    osteomyelitis. Mixed hyperlipidemia (5/22/2012) CKD (chronic kidney disease), stage IV ([facility] [address] (6/9/2012) Overview: Acute on chronic, Dr. Karen Trevino Essential hypertension (8/26/2016) Morbid obesity with BMI of 40.0-44.9, adult ([facility] [address] [facility] (5/1/2017) Plan:  
 
Acute on chronic diastolic HF/HFpEF: (EF 63-36%) Not sure what caused exacerbation, diet? CKD?  
Continue on Bumex to 2mg BID 
 Monitor I/Os, weight appears to be way up in comparison to outpt setting measurements Asked RNs to place signs on doors. Inaccurate I/Os Discussed compliance with daily weights, diet Nephrology following 
  
PAF with AV rolando ablation/Medtronic pacemaker: 
Warfarin continues, INR has not been therapeutic for a long time, ineffective in preventing CVA Checked with pharmacy - Eliquis is $0 with her insurance. Stopping coumadin as continues to be subtherapeutic. Starting Eliquis 5mg BID 
  
Chronic CKD: 
Nephrology to be consulted 
  
HTN: 
Controlled with current therapy Angy Bains [facility] Cardiology Patient seen and examined by me with the above nurse practitioner. I personally performed all components of the history, physical, and medical decision making and agree with the assessment and plan with minor modifications as noted. Diuresing well so far. Discussed daily weights, monitoring of ins and outs. It appears her weight is almost 40 pounds above her low weight, so I expect several days of diuresis as inpatient.

## 2021-03-03 NOTE — PROGRESS NOTES
Problem: Mobility Impaired (Adult and Pediatric) Goal: *Acute Goals and Plan of Care (Insert Text) Description: FUNCTIONAL STATUS PRIOR TO ADMISSION: Patient was modified independent using a rolling walker for functional mobility. L RIAA in 5/2020 and wears prosthesis. Uses wheelchair for long distances. If prosthesis doesn't fit, pt uses slide board for transfers HOME SUPPORT PRIOR TO ADMISSION: The patient lived with spouse and required some assistance (bed mobility, transfers at times). Independent ADLs Physical Therapy Goals Initiated 3/3/2021 1. Patient will move from supine to sit and sit to supine  in bed with minimal assistance/contact guard assist within 7 day(s). 2.  Patient will transfer from bed to chair and chair to bed with minimal assistance/contact guard assist using the least restrictive device within 7 day(s). 3.  Patient will perform sit to stand with minimal assistance/contact guard assist within 7 day(s). 4.  Patient will ambulate with minimal assistance/contact guard assist for 50 feet with the least restrictive device within 7 day(s). Outcome: Progressing Towards Goal 
 PHYSICAL THERAPY EVALUATION Patient: Britt Caro (39 y.o. female) Date: 3/3/2021 Primary Diagnosis: Acute respiratory failure ([facility] [J96.00] Precautions:   Fall, Contact(L BKA) ASSESSMENT Based on the objective data described below, the patient presents with decreased functional mobility, decreased tolerance to activity, generalized weakness and increased edema limiting patient's ability to don LLE prosthesis s/p admission on 2/28 with SOB and found to have CHF exacerbation resulting in respiratory failure. Pt received supine in bed and agreeable to therapy. Pt tolerated session fairly well. P[t required mod A 1-2 for rolling, supine<<>sit EOB. Pt unable to don prosthesis to attempt to stand this date. Returned to supine position and placed , elevated LLE and encouraged AROM of LLE to reduce edema. Pt will continue to benefit from PT for progressive mobility. Patient may benefit from inpatient rehab upon discharge pending progress and medical course Current Level of Function Impacting Discharge (mobility/balance): mod A 1-2 rolling, supine<>sit EOB; unable to stand 2/2 inability to don prosthesis Functional Outcome Measure: The patient scored Total Score: 1/28 on the Tinetti outcome measure which is indicative of high fall risk. Other factors to consider for discharge: previously mod I with RW, wears LLE prosthesis Patient will benefit from skilled therapy intervention to address the above noted impairments. PLAN : 
Recommendations and Planned Interventions: bed mobility training, transfer training, gait training, therapeutic exercises, neuromuscular re-education, patient and family training/education, and therapeutic activities Frequency/Duration: Patient will be followed by physical therapy:  5 times a week to address goals. Recommendation for discharge: (in order for the patient to meet his/her long term goals) Therapy 3 hours per day 5-7 days per week This discharge recommendation: 
Has been made in collaboration with the attending provider and/or case management IF patient discharges home will need the following DME: patient owns DME required for discharge SUBJECTIVE:  
Patient stated This isn't going to fit me right now.  OBJECTIVE DATA SUMMARY:  
HISTORY:   
Past Medical History:  
Diagnosis Date Acquired lymphedema Right arm Acute respiratory failure ([address] 12/19/2020 Arrhythmia Asthma Atrial fibrillation ([address] Dr. Ludwin Can and Dr. Shravan Holloway  
 Atrial flutter [facility]) Cancer ([address] bilateral breast  
 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  
Procedure Laterality Date HX AFIB ABLATION    
 HX AMPUTATION FOOT Left 04/2020 HX BREAST RECONSTRUCTION Bilateral   
 HX CARPAL TUNNEL RELEASE    
 HX CERVICAL FUSION  1994 [facility] [address] RIGHT MODIFIED RADICAL   
 HX MASTECTOMY Left   
 no lymphnode removal  
 HX MOHS PROCEDURES  1995  
 right HX ORTHOPAEDIC Right   
 knee surgery HX PACEMAKER  11/17/2020 Dr. Campbell Dawson. Insertion of permanent pacemaker. AV node ablation HX PACEMAKER    
 IR INSERT TUNL CVC W PORT OVER 5 YEARS    
 IR INSERT TUNL CVC W/O PORT OVER 5 YR  5/4/2020 IR INSERT TUNL CVC W/O PORT OVER 5 YR  9/8/2020 IR REMOVE TUNL CVAD W/O PORT / PUMP  6/26/2020 IR REMOVE TUNL CVAD W/O PORT / PUMP  10/19/2020 NH ABDOMEN SURGERY PROC UNLISTED    
 gastric bypass [address] [facility] AND 1994 [place] GASTRIC BYPASS,OBESE<150CM SAW-EN-Y  7/08  
 [facility] [place] ICAR CATHETER ABLATION ATRIOVENTR NODE FUNCTION N/A 11/17/2020 ABLATION AV NODE performed by Verner Ripa, MD at [address] [place] NEEDLE BIOPSY LIVER,W OTHR PROC  8.21.07  
 [place] RELOCATION OF SKIN POCKET FOR PACEMAKER N/A 4/24/2020 RELOCATE 2 [facility] performed by Kasandra Rodrigues MD at [address] CARDIAC CATH LAB  
 [place] RMVL TRANSVNS PM ELTRD 1 LEAD SYS ATR/VENTR N/A 4/24/2020 Remove Pacemaker Dual Leads performed by Kasandra Rodrigues MD at [address] CARDIAC CATH LAB  
 [place] RMVL TRANSVNS PM ELTRD 1 LEAD SYS ATR/VENTR N/A 4/27/2020 REMOVE PACEMAKER DUAL LEADS performed by Kasandra Rodrigues MD at [address] CARDIAC CATH LAB  
 [place] TCAT INSJ/RPL PERM LEADLESS PACEMAKER RV W/IMG N/A 11/17/2020 INSERT OR REPLACE TRANSCATH PPM LEADLESS performed by Verner Ripa, MD at [facility] [address], [facility] CATH LAB  
 [place] TRABECULOPLASTY BY LASER SURGERY    
 US GUIDE ASP OVARIAN CYST ABD APPROACH  8.21.07  
 RIGHT Personal factors and/or comorbidities impacting plan of care:  
 
Home Situation Home Environment: Private residence Wheelchair Ramp: Yes One/Two Story Residence: One story Living Alone: No 
Support Systems: Spouse/Significant Other/Partner Patient Expects to be Discharged to[de-identified] Private residence Current DME Used/Available at Home: Walker, rolling, Wheelchair, Shower chair, Grab bars Tub or Shower Type: Shower EXAMINATION/PRESENTATION/DECISION MAKING:  
Critical Behavior: 
Neurologic State: Alert Orientation Level: Oriented X4 Cognition: Follows commands Hearing: Auditory Auditory Impairment: None Skin:   
Edema: increased LLE edema Range Of Motion: 
AROM: Generally decreased, functional 
  
  
  
  
  
  
  
Strength:   
Strength: Generally decreased, functional 
  
  
  
  
  
  
Tone & Sensation:  
  
  
  
  
  
Sensation: Impaired(R foot plantar) Coordination: 
  
Vision: Functional Mobility: 
Bed Mobility: 
Rolling: Moderate assistance Supine to Sit: Moderate assistance Sit to Supine: Assist x2; Moderate assistance Scooting: Moderate assistance;Assist x2 Transfers: 
Sit to Stand: (NT--unable to don prosthesis 2/2 edema) Balance:  
Sitting: Impaired Sitting - Static: Good (unsupported) Sitting - Dynamic: Fair (occasional) Standing: (NT) Therapeutic Exercises: Ankle pumps, quad sets, SLR, knee flexion Functional Measure: 
Tinetti test: 
 
Sitting Balance: 1 Arises: 0 Attempts to Rise: 0 Immediate Standing Balance: 0 Standing Balance: 0 Nudged: 0 Eyes Closed: 0 Turn 360 Degrees - Continuous/Discontinuous: 0 Turn 360 Degrees - Steady/Unsteady: 0 Sitting Down: 0 Balance Score: 1 Balance total score Indication of Gait: 0 
R Step Length/Height: 0 
L Step Length/Height: 0 
R Foot Clearance: 0 
L Foot Clearance: 0 Step Symmetry: 0 Step Continuity: 0 Path: 0 Trunk: 0 Walking Time: 0 Gait Score: 0 Gait total score Total Score: 1/28 Overall total score Tinetti Tool Score Risk of Falls 
<19 = High Fall Risk 19-24 = Moderate Fall Risk 25-28 = Low Fall Risk Tinetti ME. Performance-Oriented Assessment of Mobility Problems in Elderly Patients. [address]; [id number]. (Scoring Description: PT Bulletin Feb. 10, 1993) Older adults: Bert Cristina et al, 2009; n = 1601 [facility] elderly evaluated with ABC, NILSON, ADL, and IADL) · Mean NILSON score for males aged 69-68 years = 26.21(3.40) · Mean NILSON score for females age 69-68 years = 25.16(4.30) · Mean NILSON score for males over 80 years = 23.29(6.02) · Mean NILSON score for females over 80 years = 17.20(8.32) Based on the above components, the patient evaluation is determined to be of the following complexity level: MEDIUM Pain Rating: 
Pt c/o low back pain Activity Tolerance:  
Good and Fair After treatment patient left in no apparent distress: Supine in bed, Call bell within reach, and Side rails x 3 
 
COMMUNICATION/EDUCATION:  
The patients plan of care was discussed with: Occupational therapist and Registered nurse. Fall prevention education was provided and the patient/caregiver indicated understanding., Patient/family have participated as able in goal setting and plan of care. , and Patient/family agree to work toward stated goals and plan of care. Thank you for this referral. 
Bran Mac, PT, DPT Time Calculation: 26 mins

## 2021-03-03 NOTE — PROGRESS NOTES
Hospitalist Progress Note NAME: Hiren Cheng :  1960 MRN:  [id number] Assessment / Plan: 
Acute on chronic diastolic HF poa  
-Last echo shows ejection fraction of 55% 
- admission weight was 298 pounds and now she is 278. Monitor weight daily 
-Continue Bumex 2  mg IV twice daily. Continue Coreg. Check BMP in a.m. 
-Cardiology is following PAF/ History of SSS s/p ablation and leadless PM insertion HTN  
-Continue Coreg 
-warfarin discontinued by cardiology and started on Eliquis. 
-Cardiology is following CKD 4 
-Baseline creatinine is around 1.9-2.0. 
-Currently creatinine is close to baseline. Diabetes mellitus: Continue sliding scale insulin and diabetic diet. Recent A1c was 6.6. Anxiety: cont home meds Left BKA 2020 Body mass index is 42.83 kg/m².: 40 or above Morbid obesity Code Status: Full code Surrogate Decision Maker:  DVT Prophylaxis: Eliquis Baseline: independent  lives with her , ambulates with prosthesis Recommended Disposition:  Home pending clinical progress Subjective: Chief Complaint / Reason for Physician Visit: FU HF No shortness of breath is slightly better. Mild cough. No phlegm. Weight is unchanged from yesterday INR is 1.2 Objective: VITALS:  
Last 24hrs VS reviewed since prior progress note. Most recent are: 
Patient Vitals for the past 24 hrs: 
 Temp Pulse Resp BP SpO2  
21 1534 97.9 °F (36.6 °C) 92 18 116/69 96 % 21 1138 97.9 °F (36.6 °C) 91 18 120/69 96 % 21 0346 97.8 °F (36.6 °C) 90 18 116/67 95 % 21 2340 97.6 °F (36.4 °C) 92 18 110/62 95 % 21 2040 97.5 °F (36.4 °C) 90 18 (!) 111/56 95 % Intake/Output Summary (Last 24 hours) at 3/3/2021 1535 Last data filed at 3/3/2021 1017 Gross per 24 hour Intake 290 ml Output  Net 290 ml I had a face to face encounter and independently examined this patient on 3/3/2021, as outlined below: PHYSICAL EXAM: 
General: WD, WN. Alert, cooperative, no acute distress, mild to moderate dyspnea at rest    
EENT:  EOMI. Anicteric sclerae. MMM Resp:  Bilateral air entry present, crackles present, no wheezing CV:  Regular  rhythm,  No edema GI:  Soft, Non distended, Non tender. +Bowel sounds Neurologic:  Alert and oriented X 3, normal speech, Psych:   Not anxious nor agitated Skin:  No rashes. No jaundice Reviewed most current lab test results and cultures  YES Reviewed most current radiology test results   YES Review and summation of old records today    NO Reviewed patient's current orders and MAR    YES 
PMH/SH reviewed - no change compared to H&P 
________________________________________________________________________ Care Plan discussed with: 
  Comments Patient y Family RN y   
Care Manager Consultant Multidiciplinary team rounds were held today with , nursing, pharmacist and clinical coordinator. Patient's plan of care was discussed; medications were reviewed and discharge planning was addressed. ________________________________________________________________________ Total NON critical care TIME:  35  Minutes Total CRITICAL CARE TIME Spent:   Minutes non procedure based Comments >50% of visit spent in counseling and coordination of care y   
________________________________________________________________________ Mayra Dickey MD  
 
Procedures: see electronic medical records for all procedures/Xrays and details which were not copied into this note but were reviewed prior to creation of Plan. LABS: 
I reviewed today's most current labs and imaging studies. Pertinent labs include: 
Recent Labs 03/03/21 
8934 03/01/21 
0419 02/28/21 
1819 WBC 6.2 5.3 6.3 HGB 10.5* 11.0* 12.5 HCT 37.8 37.7 43.8  239 278 Recent Labs 03/03/21 
0049 03/02/21 
9684 03/01/21 
4426 03/01/21 
7369 02/28/21 
1848   --   --  142 142  
K 4.7  --   --  4.0 4.0  
*  --   --  115* 113* CO2 20*  --   --  19* 19* *  --   --  155* 180* BUN 26*  --   --  22* 22* CREA 2.12*  --   --  1.96* 1.97* CA 8.7  --   --  8.6 8.8 ALB  --   --   --  3.0* 3.4* TBILI  --   --   --  1.6* 0.8 ALT  --   --   --  11* 14 INR 1.2* 1.2* 1.2*  --   --   
 
 
Signed: Ryan Villatoro MD

## 2021-03-04 NOTE — PROGRESS NOTES
Problem: Self Care Deficits Care Plan (Adult) Goal: *Acute Goals and Plan of Care (Insert Text) Description: FUNCTIONAL STATUS PRIOR TO ADMISSION: Patient was modified independent using a rolling walker for functional mobility. Was bathing seated on tub transfer bench and perform all ADLs without assist.  Cooks. When prothesis does not fit pt slide board transfers from wheelchair on her own. 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 Occupational Therapy Goals: 
Initiated 3/3/2021 1. Patient will perform grooming seated without back support and independence within 7 days. 2. Patient will perform lower body dressing to include prosthesis with supervision within 7 days. 3. Patient will perform toileting with supervision within 7 days. 4. Patient will transfer from bedside commode with supervision using the least restrictive device and appropriate durable medical equipment within 7 days. Outcome: Not Met OCCUPATIONAL THERAPY TREATMENT Patient: Krystina Artis (16 y.o. female) Date: 3/4/2021 Diagnosis: Acute respiratory failure ([address] [J96.00] Acute respiratory failure ([address] Precautions: Fall, Contact(L BKA, ESBL) Chart, occupational therapy assessment, plan of care, and goals were reviewed. ASSESSMENT Patient continues with skilled OT services and is progressing towards goals. Pt had burning in abdomen due to swelling. Increased assist needed for bed mobility today and decreased seated balance. Pt was able to sit EOB for 10 minutes but needed BUE support on surface and min assist for balance. Pt remains unable to don prosthesis due to swelling, but she did have  on and swelling was better than previous session. Two assist needed for supine to sit and sit to supine.   Remains unable to attempt standing due to inability to don prosthesis and weakness. Pt was functional prior to onset of illness. Continue to recommend inpatient rehab. Current Level of Function Impacting Discharge (ADLs):  
Rolling: Moderate assistance;Maximum assistance; Additional time;Assist x1 Supine to Sit: Moderate assistance; Additional time;Assist x2 Sit to Supine: Moderate assistance; Additional time;Assist x2 Lower Body Dressing Assistance Dressing Assistance: ( left LE with HOB raised for back support/long sit) PLAN : 
Patient continues to benefit from skilled intervention to address the above impairments. Continue treatment per established plan of care. to address goals. Recommend with staff: frequent turns, elevated LE 
 
Recommend next OT session: seated EOB tasks, attempt mobility Recommendation for discharge: (in order for the patient to meet his/her long term goals) Therapy 3 hours per day 5-7 days per week This discharge recommendation: 
Has been made in collaboration with the attending provider and/or case management IF patient discharges home will need the following DME: none SUBJECTIVE:  
Patient stated I will try.  OBJECTIVE DATA SUMMARY:  
Cognitive/Behavioral Status: 
Neurologic State: Alert Orientation Level: Oriented to person;Oriented to place;Oriented to situation Cognition: Appropriate decision making; Follows commands Perception: Appears intact Perseveration: No perseveration noted Safety/Judgement: Awareness of environment; Fall prevention Functional Mobility and Transfers for ADLs: 
Bed Mobility: 
Rolling: Moderate assistance;Maximum assistance; Additional time;Assist x1 Supine to Sit: Moderate assistance; Additional time;Assist x2 Sit to Supine: Moderate assistance; Additional time;Assist x2 Transfers: 
Sit to Stand: (unable) Balance: 
Sitting: Impaired Sitting - Static: Fair (occasional) Sitting - Dynamic: Prop sitting;Poor (constant support) ADL Intervention: Lower Body Dressing Assistance Dressing Assistance: ( left LE with HOB raised for back support/long sit) Cognitive Retraining Safety/Judgement: Awareness of environment; Fall prevention Pain: 
Burning and abdominal pain due to swelling Activity Tolerance:  
Fair and requires rest breaks After treatment patient left in no apparent distress:  
Supine in bed, Call bell within reach, and  left LE and LE elevated with pillow COMMUNICATION/COLLABORATION:  
The patients plan of care was discussed with: Physical therapist, Registered nurse, Case management, and patient . Everardo Souza OTR/L Time Calculation: 26 mins

## 2021-03-04 NOTE — PROGRESS NOTES
Transition of Care Plan: 
 
Disposition: Home with HH vs. IPR Follow up appointments: PCP, cardiology DME needed: Pt has a walker Transportation at [facility] will transport [place] or means to access home:  yes IM Medicare letter: Yanelis Irwin Caregiver Contact:Adonis Winn [phone] Discharge Caregiver contacted prior to discharge? At d/c Reason for Admission:  Acute Respiratory Failure RUR Score:   39% PCP: First and Last name: REYES White 
 Name of Practice:  [facility] Are you a current patient: Yes/No: yes Approximate date of last visit:  
 Can you do a virtual visit with your PCP: both Resources/supports as identified by patient/family:   Supportive  and counsin Top Challenges facing patient (as identified by patient/family and CM): Finances/Medication cost?   Pt is insured by [employer] Transportation?  Support system or lack thereof? Pt has a supportive spouse and cousin Living arrangements? Home w/ spouse Self-care/ADLs/Cognition? Pt can complete her ADL. Pt is AxOx4 Current Advanced Directive/Advance Care Plan:   
 
Advance Care Planning General Advance Care Planning (ACP) Conversation Date of Conversation:3/4/21 Conducted with: Patient with Decision Making Capacity Healthcare Decision Maker:  
  Primary Decision Maker: Adonis Winn - Spouse - [phone] Click here to complete [address] including selection of the Healthcare Decision Maker Relationship (ie \"Primary\") Today we documented Decision Maker(s) consistent with ACP documents on file. Content/Action Overview: Has ACP document(s) on file - reflects the patient's care preferences Reviewed DNR/DNI and patient elects Full Code (Attempt Resuscitation) Length of Voluntary ACP Conversation in minutes:  <16 minutes (Non-Billable) Paula Boothe Plan for utilizing home health:    Pt stated she does not care for it. Transition of Care Plan:     Home v. IPR 
 
CM met with pt at bedside to introduce self, verify pt's demographics, insurance and PCP. Pt is a 62 yo, ,  female who was admitted to [facility] on 2/28/21 with an admitting dx of Acute Respiratory Failure. Pt's PCP is Dr. Anthony Palumbo of [facility]. Pt saw her last a couple weeks ago and usually sees her every 3 months. Pt does not have a preference as to in-person or virtual appointments. Pt uses the [facility] in [place]. Pt resides with her  in a one floor home with 1 [address]. Pt's support system includes her  and cousin. Pt has a walker. Pt is able to complete her own ADLs/IADLs without assistance. Pt has had HH in the past but stated, \"I do not care for it. \"  Pt has not been in a SNF but has been in [address] [place] [address] for IPR. Pt is insured through [employer] [facility]. Pt's  will transport at d/c. CM will continue to follow and assess for her d/c needs. Paula Boothe,  
Care Management, [facility]

## 2021-03-04 NOTE — PROGRESS NOTES
1830: unsure if pts IV is working. Pts left arm is very edematous 2-3+. Pt c/o burning sensation when pushing IV bumex. Pt has not had much output as expected for getting bumex bid. Called PICC team to assist with IV. 
1920: PICC team here. Flushed IV and say its working fine. 1930: Bedside and Verbal shift change report given to Vee Vasques RN (oncoming nurse) by Fernando Irene (offgoing nurse). Report given with SBAR, Kardex, Intake/Output, MAR and Recent Results.

## 2021-03-04 NOTE — PROGRESS NOTES
Problem: Falls - Risk of 
Goal: *Absence of Falls Description: Document Marjorie Blood Fall Risk and appropriate interventions in the flowsheet. Outcome: Progressing Towards Goal 
Note: Fall Risk Interventions: 
Mobility Interventions: Bed/chair exit alarm, Communicate number of staff needed for ambulation/transfer, Patient to call before getting OOB Medication Interventions: Evaluate medications/consider consulting pharmacy, Bed/chair exit alarm Elimination Interventions: Call light in reach, Bed/chair exit alarm Problem: Patient Education: Go to Patient Education Activity Goal: Patient/Family Education Outcome: Progressing Towards Goal 
  
Problem: Risk for Spread of Infection Goal: Prevent transmission of infectious organism to others Description: Prevent the transmission of infectious organisms to other patients, staff members, and visitors. Outcome: Progressing Towards Goal 
  
Problem: Patient Education:  Go to Education Activity Goal: Patient/Family Education Outcome: Progressing Towards Goal 
  
Problem: Pressure Injury - Risk of 
Goal: *Prevention of pressure injury Description: Document Valdemar Scale and appropriate interventions in the flowsheet. Outcome: Progressing Towards Goal 
Note: Pressure Injury Interventions: 
  
 
Moisture Interventions: Absorbent underpads, Minimize layers Activity Interventions: Pressure redistribution bed/mattress(bed type) Mobility Interventions: Pressure redistribution bed/mattress (bed type) Nutrition Interventions: Document food/fluid/supplement intake Friction and Shear Interventions: Minimize layers Problem: Patient Education: Go to Patient Education Activity Goal: Patient/Family Education Outcome: Progressing Towards Goal 
  
Problem: Breathing Pattern - Ineffective Goal: *Absence of hypoxia Outcome: Progressing Towards Goal 
Goal: *Use of effective breathing techniques Outcome: Progressing Towards Goal 
  
 Problem: Patient Education: Go to Patient Education Activity Goal: Patient/Family Education Outcome: Progressing Towards Goal 
  
Problem: Patient Education: Go to Patient Education Activity Goal: Patient/Family Education Outcome: Progressing Towards Goal 
  
Problem: Diabetes Self-Management Goal: *Disease process and treatment process Description: Define diabetes and identify own type of diabetes; list 3 options for treating diabetes. Outcome: Progressing Towards Goal 
Goal: *Incorporating nutritional management into lifestyle Description: Describe effect of type, amount and timing of food on blood glucose; list 3 methods for planning meals. Outcome: Progressing Towards Goal 
Goal: *Incorporating physical activity into lifestyle Description: State effect of exercise on blood glucose levels. Outcome: Progressing Towards Goal 
Goal: *Developing strategies to promote health/change behavior Description: Define the ABC's of diabetes; identify appropriate screenings, schedule and personal plan for screenings. Outcome: Progressing Towards Goal 
Goal: *Using medications safely Description: State effect of diabetes medications on diabetes; name diabetes medication taking, action and side effects. Outcome: Progressing Towards Goal 
Goal: *Monitoring blood glucose, interpreting and using results Description: Identify recommended blood glucose targets  and personal targets. Outcome: Progressing Towards Goal 
Goal: *Prevention, detection, treatment of acute complications Description: List symptoms of hyper- and hypoglycemia; describe how to treat low blood sugar and actions for lowering  high blood glucose level. Outcome: Progressing Towards Goal 
Goal: *Prevention, detection and treatment of chronic complications Description: Define the natural course of diabetes and describe the relationship of blood glucose levels to long term complications of diabetes.  
Outcome: Progressing Towards Goal 
 Goal: *Developing strategies to address psychosocial issues Description: Describe feelings about living with diabetes; identify support needed and support network Outcome: Progressing Towards Goal 
Goal: *Insulin pump training Outcome: Progressing Towards Goal 
Goal: *Sick day guidelines Outcome: Progressing Towards Goal 
Goal: *Patient Specific Goal (EDIT GOAL, INSERT TEXT) Outcome: Progressing Towards Goal 
  
Problem: Patient Education: Go to Patient Education Activity Goal: Patient/Family Education Outcome: Progressing Towards Goal

## 2021-03-04 NOTE — PROGRESS NOTES
End of Shift Note Bedside shift change report given to ly (oncoming nurse) by Pricila Coyne (offgoing nurse). Report included the following information SBAR, Kardex, [facility] and Recent Results Shift worked:  night Shift summary and any significant changes:  
  none Concerns for physician to address:  none Zone phone for oncoming shift:   [phone] Activity: 
Activity Level: Up with Assistance Number times ambulated in hallways past shift: 0 Number of times OOB to chair past shift: 0 Cardiac:  
Cardiac Monitoring: Yes     
Cardiac Rhythm: Paced Access:  
Current line(s): PIV Genitourinary:  
Urinary status: voiding and external catheter Respiratory:  
O2 Device: Room air Chronic home O2 use?: N/A Incentive spirometer at bedside: N/A 
  
GI: 
Last Bowel Movement Date: 03/02/21 Current diet:  DIET CARDIAC Regular; FR 1200ML Passing flatus: YES Tolerating current diet: YES 
% Diet Eaten: 20 % Pain Management:  
Patient states pain is manageable on current regimen: YES Skin: 
Valdemar Score: 18 Interventions: float heels and internal/external urinary devices Patient Safety: 
Fall Score: Total Score: 3 Interventions: bed/chair alarm, assistive device (walker, cane, etc) and gripper socks High Fall Risk: Yes Length of Stay: 
Expected LOS: 4d 0h 
Actual LOS: [address] [facility]

## 2021-03-04 NOTE — PROGRESS NOTES
Hospitalist Progress Note NAME: Germán Maldonado :  1960 MRN:  [id number] Assessment / Plan: 
Acute on chronic diastolic HF poa  
-Last echo shows ejection fraction of 55% 
- admission weight was 298 pounds and now she is to 280 pounds. Negative balance of 12, normal today. Monitor weight daily 
-Continue Bumex 2  mg IV twice daily. Continue Coreg. Check BMP in a.m. 
-Cardiology is following PAF/ History of SSS s/p ablation and leadless PM insertion HTN  
-Continue Coreg 
-warfarin discontinued by cardiology and started on Eliquis. Continue added Eliquis. 
-Cardiology is following CKD 4 
-Baseline creatinine is around 1.9-2.0. 
-Currently creatinine is close to baseline. Diabetes mellitus: Continue sliding scale insulin and diabetic diet. Recent A1c was 6.6. Anxiety: cont home meds Left BKA 2020 Disposition: 
Discharge likely over the weekend if can achieve adequate diuresis Body mass index is 42.83 kg/m².: 40 or above Morbid obesity Code Status: Full code Surrogate Decision Maker:  DVT Prophylaxis: Eliquis Baseline: independent  lives with her , ambulates with prosthesis Recommended Disposition:  Home pending clinical progress Subjective: Chief Complaint / Reason for Physician Visit: FU HF She reports improvement of shortness of breath. Mild cough. No phlegm. Objective: VITALS:  
Last 24hrs VS reviewed since prior progress note. Most recent are: 
Patient Vitals for the past 24 hrs: 
 Temp Pulse Resp BP SpO2  
21 1203 97.9 °F (36.6 °C) 89 16 127/66 93 % 21 0700 97.6 °F (36.4 °C) 91 16 130/69 94 % 21 0411 97.4 °F (36.3 °C) 89 16 127/65 93 % 21 0001 97.4 °F (36.3 °C) 92 16 125/69 94 % 21 2049 97.6 °F (36.4 °C) (!) 110 18 125/68 94 % 21 1534 97.9 °F (36.6 °C) 92 18 116/69 96 % Intake/Output Summary (Last 24 hours) at 3/4/2021 1436 Last data filed at 3/4/2021 0413 
Gross per 24 hour Intake 30 ml Output 1150 ml Net -1120 ml I had a face to face encounter and independently examined this patient on 3/4/2021, as outlined below: PHYSICAL EXAM: 
General: WD, WN. Alert, cooperative, no acute distress, mild to moderate dyspnea at rest    
EENT:  EOMI. Anicteric sclerae. MMM Resp:  Bilateral air entry present, crackles present, no wheezing CV:  Regular  rhythm,  No edema GI:  Soft, Non distended, Non tender. +Bowel sounds Neurologic:  Alert and oriented X 3, normal speech, Psych:   Not anxious nor agitated Skin:  No rashes. No jaundice Reviewed most current lab test results and cultures  YES Reviewed most current radiology test results   YES Review and summation of old records today    NO Reviewed patient's current orders and MAR    YES 
PMH/ reviewed - no change compared to H&P 
________________________________________________________________________ Care Plan discussed with: 
  Comments Patient y Family RN y   
Care Manager Consultant Multidiciplinary team rounds were held today with , nursing, pharmacist and clinical coordinator. Patient's plan of care was discussed; medications were reviewed and discharge planning was addressed. ________________________________________________________________________ Total NON critical care TIME:  35  Minutes Total CRITICAL CARE TIME Spent:   Minutes non procedure based Comments >50% of visit spent in counseling and coordination of care y   
________________________________________________________________________ Alta Long MD  
 
Procedures: see electronic medical records for all procedures/Xrays and details which were not copied into this note but were reviewed prior to creation of Plan. LABS: 
I reviewed today's most current labs and imaging studies. Pertinent labs include: 
Recent Labs 03/03/21 
5567 WBC 6.2 HGB 10.5* HCT 37.8  
 Recent Labs 03/04/21 
0022 03/03/21 
4493 03/02/21 
8658  141  --   
K 4.0 4.7  --   
* 114*  --   
CO2 21 20*  --   
* 181*  --   
BUN 26* 26*  --   
CREA 2.10* 2.12*  --   
CA 8.7 8.7  --   
INR  --  1.2* 1.2* Signed: Lis Russo MD

## 2021-03-04 NOTE — PROGRESS NOTES
Problem: Mobility Impaired (Adult and Pediatric) Goal: *Acute Goals and Plan of Care (Insert Text) Description: FUNCTIONAL STATUS PRIOR TO ADMISSION: Patient was modified independent using a rolling walker for functional mobility. L RIAA in 5/2020 and wears prosthesis. Uses wheelchair for long distances. If prosthesis doesn't fit, pt uses slide board for transfers HOME SUPPORT PRIOR TO ADMISSION: The patient lived with spouse and required some assistance (bed mobility, transfers at times). Independent ADLs Physical Therapy Goals Initiated 3/3/2021 1. Patient will move from supine to sit and sit to supine  in bed with minimal assistance/contact guard assist within 7 day(s). 2.  Patient will transfer from bed to chair and chair to bed with minimal assistance/contact guard assist using the least restrictive device within 7 day(s). 3.  Patient will perform sit to stand with minimal assistance/contact guard assist within 7 day(s). 4.  Patient will ambulate with minimal assistance/contact guard assist for 50 feet with the least restrictive device within 7 day(s). Outcome: Not Progressing Towards Goal - limited due to swelling and prosthesis not yet fitting. PHYSICAL THERAPY TREATMENT Patient: Hank Wheeler (18 y.o. female) Date: 3/4/2021 Diagnosis: Acute respiratory failure ([address] [J96.00] Acute respiratory failure ([address] Precautions: Fall, Contact(L BKA, ESBL) Chart, physical therapy assessment, plan of care and goals were reviewed. ASSESSMENT Patient continues with skilled PT services and is not progressing this session towards goals due to swelling inhibiting fit of prosthesis. With  applied swelling distally is improved but proximally is still significant. Pt is also complaining of abdominal pain with trunk flexion and in sitting due to pressure of swelling. Pt is mod to max A to roll. She requires mod A of 2 to complete sit <> supine.  She was able to sit at EOB in side propped sitting and completed basic ankle and knee AROM as tolerated on RLE. R knee flexion range is limited by swelling as well. Pt tolerated ~10 min at EOB. On assessment of prosthetic fit, only able to [place] about 2/3 of way this session, still well below knee. Pt returned to bed with R heel floated. Current Level of Function Impacting Discharge (mobility/balance): Pt overall mod A of 2 to and from EOB, prosthetic not yet fitting due to swelling Other factors to consider for discharge: moderate edema, need for A of 2 PLAN : 
Patient continues to benefit from skilled intervention to address the above impairments. Continue treatment per established plan of care. to address goals. Recommendation for discharge: (in order for the patient to meet his/her long term goals) Therapy 3 hours per day 5-7 days per week This discharge recommendation: 
Has been made in collaboration with the attending provider and/or case management IF patient discharges home will need the following DME: patient owns DME required for discharge SUBJECTIVE:  
Patient stated I my belly hurts (when I sit up).  OBJECTIVE DATA SUMMARY:  
Critical Behavior: 
Neurologic State: Alert Orientation Level: Oriented to person, Oriented to place, Oriented to situation Cognition: Appropriate decision making, Follows commands Safety/Judgement: Awareness of environment, Fall prevention Functional Mobility Training: 
Bed Mobility: 
Rolling: Moderate assistance;Maximum assistance; Additional time;Assist x1 Supine to Sit: Moderate assistance; Additional time;Assist x2 Sit to Supine: Moderate assistance; Additional time;Assist x2 Transfers: 
Sit to Stand: (unable) Balance: 
Sitting: Impaired Sitting - Static: Fair (occasional) Sitting - Dynamic: Prop sitting;Poor (constant support) Activity Tolerance:  
Fair After treatment patient left in no apparent distress: Supine in bed, Heels elevated for pressure relief, Call bell within reach, Bed / chair alarm activated and Side rails x 3 
 
COMMUNICATION/COLLABORATION:  
The patients plan of care was discussed with: Occupational therapist and Registered nurse. Salud Winter, PT Time Calculation: 25 mins

## 2021-03-04 NOTE — PROGRESS NOTES
[address] [facility], [address] [facility]  [phone] Cardiology Progress Note 3/4/2021 11:48 AM 
 
Admit Date: 2/28/2021 Admit Diagnosis:  
Acute respiratory failure ([address] [J96.00] Subjective:  
 
Mayra Queen has no c/o CP, SOB. WT up 2#s, not sure how accurate as not documented. Output 2.2L neg since admission Weight in outpt setting anywhere from 236 - 264, and on admission it is recorded at 298#s. Continues on Bumex, Visit Vitals /66 Pulse 89 Temp 97.9 °F (36.6 °C) Resp 16 Ht 5' 10\" (1.778 m) Wt 280 lb 9.6 oz (127.3 kg) SpO2 93% BMI 40.26 kg/m² Current Facility-Administered Medications Medication Dose Route Frequency  apixaban (ELIQUIS) tablet 5 mg  5 mg Oral Q12H  pantoprazole (PROTONIX) tablet 40 mg  40 mg Oral ACB&D  
 sodium chloride (NS) flush 5-40 mL  5-40 mL IntraVENous Q8H  
 sodium chloride (NS) flush 5-40 mL  5-40 mL IntraVENous PRN  
 acetaminophen (TYLENOL) tablet 650 mg  650 mg Oral Q6H PRN Or  
 acetaminophen (TYLENOL) suppository 650 mg  650 mg Rectal Q6H PRN  polyethylene glycol (MIRALAX) packet 17 g  17 g Oral DAILY PRN  promethazine (PHENERGAN) tablet 12.5 mg  12.5 mg Oral Q6H PRN Or  
 ondansetron (ZOFRAN) injection 4 mg  4 mg IntraVENous Q6H PRN  
 oxyCODONE IR (ROXICODONE) tablet 5 mg  5 mg Oral Q4H PRN  
 oxyCODONE IR (ROXICODONE) tablet 10 mg  10 mg Oral Q4H PRN  
 albuterol-ipratropium (DUO-NEB) 2.5 MG-0.5 MG/3 ML  3 mL Nebulization Q6H PRN  
 bumetanide (BUMEX) injection 2 mg  2 mg IntraVENous BID  sertraline (ZOLOFT) tablet 125 mg  125 mg Oral DAILY  busPIRone (BUSPAR) tablet 10 mg  10 mg Oral BID  carvediloL (COREG) tablet 12.5 mg  12.5 mg Oral BID WITH MEALS  
 hydrALAZINE (APRESOLINE) 20 mg/mL injection 10 mg  10 mg IntraVENous Q2H PRN Objective:  
  
Physical Exam: General Appearance:  morbidly obese older appearing than stated age,  female in no acute distress Chest:   Clear Cardiovascular:  Regular rate and rhythm, no murmur. Abdomen:   Soft, non-tender, bowel sounds are active. Extremities: L BKA, no LE edema Skin:  Warm and dry. Data Review:  
Recent Labs 03/03/21 
8338 WBC 6.2 HGB 10.5* HCT 37.8  Recent Labs 03/04/21 
0022 03/03/21 
3918 03/02/21 
9449  141  --   
K 4.0 4.7  --   
* 114*  --   
CO2 21 20*  --   
* 181*  --   
BUN 26* 26*  --   
CREA 2.10* 2.12*  --   
CA 8.7 8.7  --   
INR  --  1.2* 1.2* No results for input(s): TROIQ, CPK, CKMB in the last 72 hours. Intake/Output Summary (Last 24 hours) at 3/4/2021 1318 Last data filed at 3/4/2021 1691 Gross per 24 hour Intake 250 ml Output 1150 ml Net -900 ml Telemetry: SR 
 
 
Assessment:  
 
Principal Problem: 
  Acute respiratory failure ([facility] (12/20/2020) Active Problems: S/P atrioventricular rolando ablation (11/17/2020) S/P cardiac pacemaker procedure (4/3/2012) Overview: 4/2/12 Medtronic dual chamber pacemaker implant Pacer removed April 2020 due vegetations related to diabetic foot  
    osteomyelitis. Mixed hyperlipidemia (5/22/2012) CKD (chronic kidney disease), stage IV ([facility] (6/9/2012) Overview: Acute on chronic, Dr. Suresh Thornton Essential hypertension (8/26/2016) Morbid obesity with BMI of 40.0-44.9, adult ([facility] (5/1/2017) Plan:  
 
Acute on chronic diastolic HF/HFpEF: (EF 96-36%) Not sure what caused exacerbation, diet? CKD? Continue on Bumex to 2mg BID. Output better recorded Monitor I/Os, Discussed compliance with daily weights, diet Renal fx stable 
  
PAF with AV rolando ablation/Medtronic pacemaker: 
INR ha not been therapeutic for a long time on warfarin ineffective in preventing CVA Checked with pharmacy - Eliquis is $0 with her insurance. Continue Eliquis 5mg BID 
  
Chronic CKD: 
stable 
  
HTN: 
Controlled with current therapy Nobie Abu ACNP Cardiology Patient seen and examined by me with the above nurse practitioner. I personally performed all components of the history, physical, and medical decision making and agree with the assessment and plan with minor modifications as noted. Feeling better, net negative. Encouraged PT/ OT. Eliquis.

## 2021-03-05 NOTE — PROGRESS NOTES
End of Shift Note Bedside shift change report given to Tom Magdaleno (oncoming nurse) by Jesus Fontanez RN (offgoing nurse). Report included the following information SBAR Shift worked:  [id number] Shift summary and any significant changes:  
  None Concerns for physician to address:  D/c plan 
  
Zone phone for oncoming shift:   [phone] Activity: 
Activity Level: Up with Assistance Number times ambulated in hallways past shift: 0 Number of times OOB to chair past shift: 0 Cardiac:  
Cardiac Monitoring: Yes     
Cardiac Rhythm: Normal sinus rhythm Access:  
Current line(s): PIV Genitourinary:  
Urinary status: voiding and external catheter Respiratory:  
O2 Device: Room air Chronic home O2 use?: NO Incentive spirometer at bedside: NO 
  
GI: 
Last Bowel Movement Date: 03/02/21 Current diet:  DIET CARDIAC Regular; FR 1200ML Passing flatus: YES Tolerating current diet: YES 
% Diet Eaten: 100 % Pain Management:  
Patient states pain is manageable on current regimen: YES Skin: 
Valdemar Score: 18 Interventions: float heels, PT/OT consult and internal/external urinary devices Patient Safety: 
Fall Score: Total Score: 3 Interventions: bed/chair alarm, assistive device (walker, cane, etc) and pt to call before getting OOB High Fall Risk: Yes Length of Stay: 
Expected LOS: 4d 0h 
Actual LOS: 5 Jesus Fontanez RN

## 2021-03-05 NOTE — PROGRESS NOTES
[address], [address] [facility]  [phone] Cardiology Progress Note 3/5/2021 11:48 AM 
 
Admit Date: 2/28/2021 Admit Diagnosis:  
Acute respiratory failure ([address] [J96.00] Subjective:  
 
Juan Gonzales has no c/o CP, SOB. WT up 2#s, not sure how accurate as not documented. Output 2.2L neg since admission Weight in outpt setting anywhere from 236 - 264, and on admission it is recorded at 298#s. Continues on Bumex, Visit Vitals BP (!) 150/84 Pulse 91 Temp 98.3 °F (36.8 °C) Resp 18 Ht 5' 10\" (1.778 m) Wt 283 lb 1.1 oz (128.4 kg) SpO2 94% BMI 40.62 kg/m² Current Facility-Administered Medications Medication Dose Route Frequency  metOLazone (ZAROXOLYN) tablet 2.5 mg  2.5 mg Oral DAILY  apixaban (ELIQUIS) tablet 5 mg  5 mg Oral Q12H  pantoprazole (PROTONIX) tablet 40 mg  40 mg Oral ACB&D  
 sodium chloride (NS) flush 5-40 mL  5-40 mL IntraVENous Q8H  
 sodium chloride (NS) flush 5-40 mL  5-40 mL IntraVENous PRN  
 acetaminophen (TYLENOL) tablet 650 mg  650 mg Oral Q6H PRN Or  
 acetaminophen (TYLENOL) suppository 650 mg  650 mg Rectal Q6H PRN  polyethylene glycol (MIRALAX) packet 17 g  17 g Oral DAILY PRN  promethazine (PHENERGAN) tablet 12.5 mg  12.5 mg Oral Q6H PRN Or  
 ondansetron (ZOFRAN) injection 4 mg  4 mg IntraVENous Q6H PRN  
 oxyCODONE IR (ROXICODONE) tablet 5 mg  5 mg Oral Q4H PRN  
 oxyCODONE IR (ROXICODONE) tablet 10 mg  10 mg Oral Q4H PRN  
 albuterol-ipratropium (DUO-NEB) 2.5 MG-0.5 MG/3 ML  3 mL Nebulization Q6H PRN  
 bumetanide (BUMEX) injection 2 mg  2 mg IntraVENous BID  sertraline (ZOLOFT) tablet 125 mg  125 mg Oral DAILY  busPIRone (BUSPAR) tablet 10 mg  10 mg Oral BID  carvediloL (COREG) tablet 12.5 mg  12.5 mg Oral BID WITH MEALS  
 hydrALAZINE (APRESOLINE) 20 mg/mL injection 10 mg  10 mg IntraVENous Q2H PRN Objective:  
  
Physical Exam: 
General Appearance:  morbidly obese older appearing than stated age,  female in no acute distress Chest:   Clear Cardiovascular:  Regular rate and rhythm, no murmur. Abdomen:   Soft, non-tender, bowel sounds are active. Extremities: L BKA, no LE edema Skin:  Warm and dry. Data Review:  
Recent Labs 03/05/21 
0237 03/03/21 
4347 WBC 3.8 6.2 HGB 10.8* 10.5* HCT 38.1 37.8  220 Recent Labs 03/05/21 
4799 03/04/21 
0022 03/03/21 
8617  141 141  
K 3.9 4.0 4.7 * 113* 114* CO2 22 21 20* * 182* 181* BUN 28* 26* 26* CREA 2.20* 2.10* 2.12* CA 8.7 8.7 8.7 INR  --   --  1.2* No results for input(s): TROIQ, CPK, CKMB in the last 72 hours. Intake/Output Summary (Last 24 hours) at 3/5/2021 1752 Last data filed at 3/5/2021 1148 Gross per 24 hour Intake 120 ml Output 1650 ml Net -1530 ml Telemetry: SR 
 
 
Assessment:  
 
Principal Problem: 
  Acute respiratory failure ([facility] [address] (12/20/2020) Active Problems: S/P atrioventricular rolando ablation (11/17/2020) S/P cardiac pacemaker procedure (4/3/2012) Overview: 4/2/12 Medtronic dual chamber pacemaker implant Pacer removed April 2020 due vegetations related to diabetic foot  
    osteomyelitis. Mixed hyperlipidemia (5/22/2012) CKD (chronic kidney disease), stage IV ([address] (6/9/2012) Overview: Acute on chronic, Dr. Elly De Souza Essential hypertension (8/26/2016) Morbid obesity with BMI of 40.0-44.9, adult ([facility] [address] (5/1/2017) Plan:  
 
Acute on chronic diastolic HF/HFpEF: (EF 99-43%) Not sure what caused exacerbation, diet? CKD? Continue on Bumex to 2mg BID. Output better recorded Monitor I/Os,  
4.4 L negative as of 3/5/2021, patient feeling better, but weight appears to be increasingquestion accuracy of weights? ?? 
I think dry weight is near 240 based on lowest weight in recent past- currently around 280?? Discussed compliance with daily weights, diet Renal fx stable 
  
PAF with AV rolando ablation/Medtronic pacemaker: 
INR ha not been therapeutic for a long time on warfarin ineffective in preventing CVA Checked with pharmacy - Eliquis is $0 with her insurance. Continue Eliquis 5mg BID 
  
Chronic CKD: 
stable 
  
HTN: 
Controlled with current therapy

## 2021-03-05 NOTE — PROGRESS NOTES
Problem: Self Care Deficits Care Plan (Adult) Goal: *Acute Goals and Plan of Care (Insert Text) Description: FUNCTIONAL STATUS PRIOR TO ADMISSION: Patient was modified independent using a rolling walker for functional mobility. Was bathing seated on tub transfer bench and perform all ADLs without assist.  [place]. When prothesis does not fit pt slide board transfers from wheelchair on her own. 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 Occupational Therapy Goals: 
Initiated 3/3/2021 1. Patient will perform grooming seated without back support and independence within 7 days. 2. Patient will perform lower body dressing to include prosthesis with supervision within 7 days. 3. Patient will perform toileting with supervision within 7 days. 4. Patient will transfer from bedside commode with supervision using the least restrictive device and appropriate durable medical equipment within 7 days. Outcome: Progressing Towards Goal 
 OCCUPATIONAL THERAPY TREATMENT Patient: Jeyson Beltre (79 y.o. female) Date: 3/5/2021 Diagnosis: Acute respiratory failure ([address] [J96.00] Acute respiratory failure ([address] Precautions: Fall, Contact(L BKA, ESBL) Chart, occupational therapy assessment, plan of care, and goals were reviewed. ASSESSMENT Patient continues with skilled OT services and is progressing towards goals. Pt continues to have burning in abdomen due to swelling (7/10 today) but pt was able to tolerate more activity today. Max assist x2 needed for supine to sit EOB today but improved dynamic seated balance. Pt continues to wear  on LLE but it only comes to just below knee. Pt was able to doff  EOB and don sleeve for prosthetic seated EOB with SBA.   Therapist was able to get pts LLE to clip into prosthesis today but it was to painful around the lateral aspects of her knee to attempt to stand due to swelling. Recommended pt wear her sleeve to help with swelling around knee and above knee joint as current  only comes to just below knee joint. Pt reports that she only has shrinkers that come to just below her knee. May need to reach out to prosthetic company to see if pt can obtain longer length . Total assist to scoot up [facility]. Pt remains frustrated as she does not want to loose the ability to walk and was ambulatory prior to admit. Unable to attempt sit to stand today due to pain. Obtained drop arm recliner and sliding board for pt to be OOB but pt is unable to scoot laterally at this time. Continue to recommend rehab at discharge. Current Level of Function Impacting Discharge (ADLs):  
Rolling: Moderate assistance;Maximum assistance Supine to Sit: Maximum assistance;Assist x2 Sit to Supine: Moderate assistance;Maximum assistance;Assist x2 Scooting: Contact guard assistance(in sitting towards EOB; total in supine towards HOB +) Lower Body Dressing Assistance Dressing Assistance: Stand-by assistance(doffing  and donning sleeve for prosthesis seated EOB) Other factors to consider for discharge: was ambulatory and functional prior admit PLAN : 
Patient continues to benefit from skilled intervention to address the above impairments. Continue treatment per established plan of care. to address goals. Recommend with staff: assist with alternating between  and sleeve for edema management on residual limb Recommend next OT session: scooting, sit to stand, ADLS Recommendation for discharge: (in order for the patient to meet his/her long term goals) Therapy 3 hours per day 5-7 days per week This discharge recommendation: 
Has been made in collaboration with the attending provider and/or case management IF patient discharges home will need the following DME: none SUBJECTIVE:  
Patient stated It hurts to much to try and stand on it. I am sorry.  OBJECTIVE DATA SUMMARY:  
Cognitive/Behavioral Status: 
Neurologic State: Alert; Appropriate for age Orientation Level: Appropriate for age;Oriented X4 Cognition: Appropriate decision making Perception: Appears intact Perseveration: No perseveration noted Safety/Judgement: Awareness of environment; Fall prevention Functional Mobility and Transfers for ADLs: 
Bed Mobility: 
Rolling: Moderate assistance;Maximum assistance Supine to Sit: Maximum assistance;Assist x2 Sit to Supine: Moderate assistance;Maximum assistance;Assist x2 Scooting: Contact guard assistance(in sitting towards EOB; total in supine towards HOB +) Transfers: 
Sit to Stand: (NT) 
  
  
 
Balance: 
Sitting: Impaired Sitting - Static: Fair (occasional) Sitting - Dynamic: Poor (constant support) Standing: (NT) 
 
ADL Intervention: Lower Body Dressing Assistance Dressing Assistance: Stand-by assistance(doffing  and donning sleeve for prosthesis seated EOB) Cognitive Retraining Safety/Judgement: Awareness of environment; Fall prevention Activity Tolerance:  
Fair After treatment patient left in no apparent distress:  
Supine in bed, Heels elevated for pressure relief, and Call bell within reach COMMUNICATION/COLLABORATION:  
The patients plan of care was discussed with: Physical therapist, Registered nurse, and patient . Ashley Alejandro OTR/L Time Calculation: 26 mins

## 2021-03-05 NOTE — PROGRESS NOTES
Problem: Falls - Risk of 
Goal: *Absence of Falls Description: Document Bard Danielson Fall Risk and appropriate interventions in the flowsheet. Outcome: Progressing Towards Goal 
Note: Fall Risk Interventions: 
Mobility Interventions: Bed/chair exit alarm Mentation Interventions: Adequate sleep, hydration, pain control, Bed/chair exit alarm Medication Interventions: Bed/chair exit alarm Elimination Interventions: Call light in reach, Bed/chair exit alarm Problem: Patient Education: Go to Patient Education Activity Goal: Patient/Family Education Outcome: Progressing Towards Goal 
  
Problem: Risk for Spread of Infection Goal: Prevent transmission of infectious organism to others Description: Prevent the transmission of infectious organisms to other patients, staff members, and visitors. Outcome: Progressing Towards Goal 
  
Problem: Patient Education:  Go to Education Activity Goal: Patient/Family Education Outcome: Progressing Towards Goal 
  
Problem: Pressure Injury - Risk of 
Goal: *Prevention of pressure injury Description: Document Valdemar Scale and appropriate interventions in the flowsheet. Outcome: Progressing Towards Goal 
Note: Pressure Injury Interventions: 
Sensory Interventions: Discuss PT/OT consult with provider Moisture Interventions: Moisture barrier, Minimize layers, Maintain skin hydration (lotion/cream), Limit adult briefs Activity Interventions: PT/OT evaluation Mobility Interventions: PT/OT evaluation Nutrition Interventions: Offer support with meals,snacks and hydration Friction and Shear Interventions: HOB 30 degrees or less Problem: Patient Education: Go to Patient Education Activity Goal: Patient/Family Education Outcome: Progressing Towards Goal 
  
Problem: Breathing Pattern - Ineffective Goal: *Absence of hypoxia Outcome: Progressing Towards Goal 
Goal: *Use of effective breathing techniques Outcome: Progressing Towards Goal 
 Problem: Patient Education: Go to Patient Education Activity Goal: Patient/Family Education Outcome: Progressing Towards Goal 
  
Problem: Patient Education: Go to Patient Education Activity Goal: Patient/Family Education Outcome: Progressing Towards Goal 
  
Problem: Patient Education: Go to Patient Education Activity Goal: Patient/Family Education Outcome: Progressing Towards Goal

## 2021-03-05 NOTE — PROGRESS NOTES
Hospitalist Progress Note NAME: Jacinta Andre :  1960 MRN:  [id number] Assessment / Plan: 
Acute on chronic diastolic congestive heart failure poa  
-Last echo shows ejection fraction of 55% 
- admission weight was 298 pounds and weight decreased to 278 pounds but now up to 283 pounds. Negative balance of 2.4 L today 
-Continue Bumex 2  mg IV twice daily. Add metolazone as weight is going up. Continue Coreg. Check BMP in a.m. 
-Cardiology is following PAF/ History of SSS s/p ablation and leadless PM insertion HTN  
-Continue Coreg 
-warfarin discontinued by cardiology and started on Eliquis. Continue added Eliquis. 
-Cardiology is following CKD 4 
-Baseline creatinine is around 1.9-2.0. Creatinine is 2.2 on monitor daily 
-Currently creatinine is close to baseline. Diabetes mellitus: Continue sliding scale insulin and diabetic diet. Recent A1c was 6.6. Anxiety: cont home meds Left BKA 2020 Disposition: 
Discharge likely over the weekend if can achieve adequate diuresis Body mass index is 42.83 kg/m².: 40 or above Morbid obesity Code Status: Full code Surrogate Decision Maker:  DVT Prophylaxis: Eliquis Baseline: independent  lives with her , ambulates with prosthesis Recommended Disposition:  Home pending clinical progress Subjective: Chief Complaint / Reason for Physician Visit: FU HF  
Reports improvement of shortness of breath along with cough and phlegm No fever Feels less puffy Negative balance of 2 L so far Weight is slightly up to 283 pounds Objective: VITALS:  
Last 24hrs VS reviewed since prior progress note. Most recent are: 
Patient Vitals for the past 24 hrs: 
 Temp Pulse Resp BP SpO2  
21 1528 98.3 °F (36.8 °C) 91 18 (!) 150/84 94 % 21 1148 97.9 °F (36.6 °C) 87 18 133/86 97 % 21 0739 97.9 °F (36.6 °C) 89 18 127/73 94 % 21 0736     95 % 21 0401 98.2 °F (36.8 °C) 90 18 135/85 94 % 03/04/21 2315 97.8 °F (36.6 °C) 90 18 (!) 152/90 99 % 03/04/21 2007 97.4 °F (36.3 °C) 90 16 (!) 143/81 96 % 03/04/21 1557 97.9 °F (36.6 °C) 92 16 130/69 94 % Intake/Output Summary (Last 24 hours) at 3/5/2021 1543 Last data filed at 3/5/2021 1148 Gross per 24 hour Intake 240 ml Output 2650 ml Net -2410 ml I had a face to face encounter and independently examined this patient on 3/5/2021, as outlined below: PHYSICAL EXAM: 
General: WD, WN. Alert, cooperative, no acute distress, mild to moderate dyspnea at rest    
EENT:  EOMI. Anicteric sclerae. MMM Resp:  Bilateral air entry present, crackles present, no wheezing CV:  Regular  rhythm,  No edema GI:  Soft, Non distended, Non tender. +Bowel sounds Neurologic:  Alert and oriented X 3, normal speech, Psych:   Not anxious nor agitated Skin:  No rashes. No jaundice Reviewed most current lab test results and cultures  YES Reviewed most current radiology test results   YES Review and summation of old records today    NO Reviewed patient's current orders and MAR    YES 
PMH/SH reviewed - no change compared to H&P 
________________________________________________________________________ Care Plan discussed with: 
  Comments Patient y Family RN y   
Care Manager Consultant Multidiciplinary team rounds were held today with , nursing, pharmacist and clinical coordinator. Patient's plan of care was discussed; medications were reviewed and discharge planning was addressed. ________________________________________________________________________ Total NON critical care TIME:  35  Minutes Total CRITICAL CARE TIME Spent:   Minutes non procedure based Comments >50% of visit spent in counseling and coordination of care y   
________________________________________________________________________ Daria Warren MD  
 
Procedures: see electronic medical records for all procedures/Xrays and details which were not copied into this note but were reviewed prior to creation of Plan. LABS: 
I reviewed today's most current labs and imaging studies. Pertinent labs include: 
Recent Labs 03/05/21 
7004 03/03/21 
8613 WBC 3.8 6.2 HGB 10.8* 10.5* HCT 38.1 37.8  220 Recent Labs 03/05/21 
0312 03/04/21 
0022 03/03/21 
7967  141 141  
K 3.9 4.0 4.7 * 113* 114* CO2 22 21 20* * 182* 181* BUN 28* 26* 26* CREA 2.20* 2.10* 2.12* CA 8.7 8.7 8.7 INR  --   --  1.2* Signed: Alta Long MD

## 2021-03-05 NOTE — PROGRESS NOTES
Problem: Mobility Impaired (Adult and Pediatric) Goal: *Acute Goals and Plan of Care (Insert Text) Description: FUNCTIONAL STATUS PRIOR TO ADMISSION: Patient was modified independent using a rolling walker for functional mobility. PATRICIA CHILEL in 5/2020 and wears prosthesis. Uses wheelchair for long distances. If prosthesis doesn't fit, pt uses slide board for transfers HOME SUPPORT PRIOR TO ADMISSION: The patient lived with spouse and required some assistance (bed mobility, transfers at times). Independent ADLs Physical Therapy Goals Initiated 3/3/2021 1. Patient will move from supine to sit and sit to supine  in bed with minimal assistance/contact guard assist within 7 day(s). 2.  Patient will transfer from bed to chair and chair to bed with minimal assistance/contact guard assist using the least restrictive device within 7 day(s). 3.  Patient will perform sit to stand with minimal assistance/contact guard assist within 7 day(s). 4.  Patient will ambulate with minimal assistance/contact guard assist for 50 feet with the least restrictive device within 7 day(s). Outcome: Not Progressing Towards Goal 
 PHYSICAL THERAPY TREATMENT Patient: Igor Abel (89 y.o. female) Date: 3/5/2021 Diagnosis: Acute respiratory failure ([facility] [J96.00] Acute respiratory failure ([facility] Precautions: Fall, Contact(L BKA, ESBL) Chart, physical therapy assessment, plan of care and goals were reviewed. ASSESSMENT Patient continues with skilled PT services and is not progressing towards goals. Patient continues to demonstrate limited functional mobility and decreased independence secondary to impaired sitting balance, decreased BLE and BUE ROM, generalized weakness, increased BLE edema, limited endurance, increased abdominal pain, and decreased activity tolerance. Pt received supine in bed and agreeable to PT intervention.  Reported mild improvements in BLE edema, however skin very tender and sensitive to touch on lateral and medial aspects of lower residual limb (from trauma of wearing prothesis with increased edema). Required max A x 2 to transfer to EOB despite use of bed railing. Exhibited fair sitting balance while sitting on EOB x ~ 5 minutes, with occasional posterior lean/LOB requiring at least min A to recover. Patient able to fully [place] prothesis with assistance, however hesitant about placing weight through limb as prothesis putting too much pressure on limb due to increased edema in sitting. Provided patient with drop arm recliner and sliding board, however patient declined to participate in bed>chair transfer. She was unable to laterally scoot in sitting towards [facility] despite cueing for RLE placement and technique. Pt was left supine in bed with all needs met, RN aware, RN tech present, and bed alarm on following therapy session. Continue to recommend patient discharge to IP rehab to address functional impairments as noted above as patient with decline from baseline mobility and is at an increased risk for falls. Current Level of Function Impacting Discharge (mobility/balance): mod-max x 2 for supine<>sit transfers; CGA to scoot forward towards EOB in sitting; total A to scoot in supine towards [facility] and with attempted lateral scooting in sitting towards [facility] Other factors to consider for discharge: increased risk for falls; significant decline from baseline mobility PLAN : 
Patient continues to benefit from skilled intervention to address the above impairments. Continue treatment per established plan of care. to address goals. Recommendation for discharge: (in order for the patient to meet his/her long term goals) Therapy 3 hours per day 5-7 days per week This discharge recommendation: 
Has been made in collaboration with the attending provider and/or case management IF patient discharges home will need the following DME: to be determined (TBD) SUBJECTIVE:  
Patient stated I don't want to do that.  OBJECTIVE DATA SUMMARY:  
Critical Behavior: 
Neurologic State: Alert, Appropriate for age Orientation Level: Appropriate for age, Oriented X4 Cognition: Appropriate decision making Safety/Judgement: Awareness of environment, Fall prevention Functional Mobility Training: 
Bed Mobility: 
Rolling: Moderate assistance;Maximum assistance Supine to Sit: Maximum assistance;Assist x2 Sit to Supine: Moderate assistance;Maximum assistance;Assist x2 Scooting: Contact guard assistance(in sitting towards EOB; total in supine towards HOB and in sitting laterally towards HOB) Transfers: 
Sit to Stand: (NT) 
Balance: 
Sitting: Impaired Sitting - Static: Fair (occasional) Sitting - Dynamic: Poor (constant support) Standing: (NT) 
 
Pain Ratin/10 abdominal pain Activity Tolerance:  
Fair and requires rest breaks After treatment patient left in no apparent distress:  
Supine in bed, Call bell within reach, Bed / chair alarm activated, Caregiver / family present and Side rails x 3 
 
COMMUNICATION/COLLABORATION:  
The patients plan of care was discussed with: Physical therapist, Occupational therapist and Registered nurse. Francesca Leo, PT, DPT Time Calculation: 30 mins

## 2021-03-06 NOTE — PROGRESS NOTES
Problem: Falls - Risk of 
Goal: *Absence of Falls Description: Document Bard Danielson Fall Risk and appropriate interventions in the flowsheet. Outcome: Progressing Towards Goal 
Note: Fall Risk Interventions: 
Mobility Interventions: Bed/chair exit alarm Mentation Interventions: Adequate sleep, hydration, pain control, Bed/chair exit alarm Medication Interventions: Bed/chair exit alarm Elimination Interventions: Call light in reach, Bed/chair exit alarm Problem: Pressure Injury - Risk of 
Goal: *Prevention of pressure injury Description: Document Valdemar Scale and appropriate interventions in the flowsheet. Outcome: Progressing Towards Goal 
Note: Pressure Injury Interventions: 
Sensory Interventions: Assess changes in LOC Moisture Interventions: Absorbent underpads, Minimize layers Activity Interventions: PT/OT evaluation Mobility Interventions: PT/OT evaluation Nutrition Interventions: Document food/fluid/supplement intake Friction and Shear Interventions: Lift team/patient mobility team, Apply protective barrier, creams and emollients, Minimize layers Problem: Breathing Pattern - Ineffective Goal: *Absence of hypoxia Outcome: Progressing Towards Goal

## 2021-03-06 NOTE — PROGRESS NOTES
End of Shift Note Bedside shift change report given to [facility] (oncoming nurse) by Elizabeth Cleveland (offgoing nurse). Report included the following information SBAR Shift worked:  [id number] Shift summary and any significant changes:  
  None Concerns for physician to address:   
  
Zone phone for oncoming shift:   [phone] Activity: 
Activity Level: Bed Rest 
Number times ambulated in hallways past shift: 0 Number of times OOB to chair past shift: 0 Cardiac:  
Cardiac Monitoring: Yes     
Cardiac Rhythm: Paced Access:  
Current line(s): PIV Genitourinary:  
Urinary status: voiding and external catheter Respiratory:  
O2 Device: Room air Chronic home O2 use?: NO Incentive spirometer at bedside: NO 
  
GI: 
Last Bowel Movement Date: 03/05/21 Current diet:  DIET CARDIAC Regular; FR 1200ML Passing flatus: YES Tolerating current diet: YES 
% Diet Eaten: 40 % Pain Management:  
Patient states pain is manageable on current regimen: YES Skin: 
Valdemar Score: 16 Interventions: float heels, PT/OT consult and internal/external urinary devices Patient Safety: 
Fall Score: Total Score: 1 Interventions: bed/chair alarm, assistive device (walker, cane, etc) and pt to call before getting OOB High Fall Risk: Yes Length of Stay: 
Expected LOS: 4d 0h 
Actual LOS: 6 Elizabeth Cleveland

## 2021-03-06 NOTE — PROGRESS NOTES
3/6/2021 11:05 AM 
 
Admit Date: 2/28/2021 Admit Diagnosis: Acute respiratory failure ([facility] [J96.00] Subjective:  
 
Aubree Arroyo reports breathing is better. She denies chest pain, chest pressure/discomfort, palpitations, irregular heart beats, orthopnea, paroxysmal nocturnal dyspnea, exertional chest pressure/discomfort. Visit Vitals BP (!) 113/56 Pulse 92 Temp 97.8 °F (36.6 °C) Resp 18 Ht 5' 10\" (1.778 m) Wt 269 lb 1.6 oz (122.1 kg) SpO2 95% BMI 38.61 kg/m² Current Facility-Administered Medications Medication Dose Route Frequency  metOLazone (ZAROXOLYN) tablet 2.5 mg  2.5 mg Oral DAILY  apixaban (ELIQUIS) tablet 5 mg  5 mg Oral Q12H  pantoprazole (PROTONIX) tablet 40 mg  40 mg Oral ACB&D  
 sodium chloride (NS) flush 5-40 mL  5-40 mL IntraVENous Q8H  
 sodium chloride (NS) flush 5-40 mL  5-40 mL IntraVENous PRN  
 acetaminophen (TYLENOL) tablet 650 mg  650 mg Oral Q6H PRN Or  
 acetaminophen (TYLENOL) suppository 650 mg  650 mg Rectal Q6H PRN  polyethylene glycol (MIRALAX) packet 17 g  17 g Oral DAILY PRN  promethazine (PHENERGAN) tablet 12.5 mg  12.5 mg Oral Q6H PRN Or  
 ondansetron (ZOFRAN) injection 4 mg  4 mg IntraVENous Q6H PRN  
 oxyCODONE IR (ROXICODONE) tablet 5 mg  5 mg Oral Q4H PRN  
 oxyCODONE IR (ROXICODONE) tablet 10 mg  10 mg Oral Q4H PRN  
 albuterol-ipratropium (DUO-NEB) 2.5 MG-0.5 MG/3 ML  3 mL Nebulization Q6H PRN  
 bumetanide (BUMEX) injection 2 mg  2 mg IntraVENous BID  sertraline (ZOLOFT) tablet 125 mg  125 mg Oral DAILY  busPIRone (BUSPAR) tablet 10 mg  10 mg Oral BID  carvediloL (COREG) tablet 12.5 mg  12.5 mg Oral BID WITH MEALS  
 hydrALAZINE (APRESOLINE) 20 mg/mL injection 10 mg  10 mg IntraVENous Q2H PRN Objective:  
  
Visit Vitals BP (!) 113/56 Pulse 92 Temp 97.8 °F (36.6 °C) Resp 18 Ht 5' 10\" (1.778 m) Wt 269 lb 1.6 oz (122.1 kg) SpO2 95% BMI 38.61 kg/m² Physical Exam: 
Resting comfortably. No resp distress. Extremities: left BKA Heart: regular rate and rhythm, S1, S2 normal, no murmur, click, rub or gallop Lungs: clear to auscultation bilaterally Neurologic: Grossly normal 
 
Data Review:  
Labs:   
Recent Results (from the past 24 hour(s)) GLUCOSE, POC Collection Time: 03/05/21 11:16 AM  
Result Value Ref Range Glucose (POC) 203 (H) 65 - 100 mg/dL Performed by Coleen Hart (PCT) GLUCOSE, POC Collection Time: 03/05/21  4:31 PM  
Result Value Ref Range Glucose (POC) 182 (H) 65 - 100 mg/dL Performed by Acacia Burger Telemetry: paced Assessment:  
 
Principal Problem: 
  Acute respiratory failure ([facility] (12/20/2020) Active Problems: S/P atrioventricular rolando ablation (11/17/2020) S/P cardiac pacemaker procedure (4/3/2012) Overview: 4/2/12 Medtronic dual chamber pacemaker implant Pacer removed April 2020 due vegetations related to diabetic foot  
    osteomyelitis. Mixed hyperlipidemia (5/22/2012) CKD (chronic kidney disease), stage IV ([facility] (6/9/2012) Overview: Acute on chronic, Dr. Juan Hansen Essential hypertension (8/26/2016) Morbid obesity with BMI of 40.0-44.9, adult ([facility] (5/1/2017) Plan:  
 
Acute on chronic diastolic HF/HFpEF: (EF 73-05%) Continue current meds. -ve fluid balance.  
  
PAF with AV rolando ablation/Medtronic pacemaker: 
Continue current meds.  
  
Chronic CKD: 
stable 
  
HTN: 
Controlled with current therapy

## 2021-03-07 NOTE — PROGRESS NOTES
Hospitalist Progress Note NAME: Graham Lund :  1960 MRN:  [id number] Assessment / Plan: 
Acute on chronic diastolic congestive heart failure poa  
-Last echo shows ejection fraction of 55% 
- admission weight was 298 pounds and weight now at 269 pounds 
-Continue Bumex 2  mg IV twice daily. CONT  metolazone for few more days. Continue Coreg. Check BMP in a.m. 
-Cardiology is following PAF/ History of SSS s/p ablation and leadless PM insertion HTN  
-Continue Coreg 
-warfarin discontinued by cardiology and started on Eliquis. Continue added Eliquis. 
-Cardiology is following CKD 4 
-Baseline creatinine is around 1.9-2.0. Creatinine is 2.2 on monitor daily 
-Currently creatinine is close to baseline. Diabetes mellitus: Continue sliding scale insulin and diabetic diet. Recent A1c was 6.6. Anxiety: cont home meds Left BKA 2020 Disposition: 
Discharge likely over the weekend if can achieve adequate diuresis Body mass index is 42.83 kg/m².: 40 or above Morbid obesity Code Status: Full code Surrogate Decision Maker:  DVT Prophylaxis: Eliquis Baseline: independent  lives with her , ambulates with prosthesis Recommended Disposition:  Home pending clinical progress Subjective: Chief Complaint / Reason for Physician Visit: FU HF Sob is better. Weight is down to 269 pounds. Objective: VITALS:  
Last 24hrs VS reviewed since prior progress note. Most recent are: 
Patient Vitals for the past 24 hrs: 
 Temp Pulse Resp BP SpO2  
21 1918 98.1 °F (36.7 °C) 92 18 (!) 122/58 96 % 21 1605 97.4 °F (36.3 °C) 91 18 (!) 155/88 100 % 21 1140 97.5 °F (36.4 °C) 90 18 139/84 95 % 21 0754 97.8 °F (36.6 °C) 92 18 (!) 113/56 95 % 21 0317 97.4 °F (36.3 °C) 91 18 131/74 95 % 21 2352 97.7 °F (36.5 °C) 90 18 136/77 94 % Intake/Output Summary (Last 24 hours) at 3/6/2021 2223 Last data filed at 3/6/2021 1410 Gross per 24 hour Intake  Output 1720 ml Net -1720 ml I had a face to face encounter and independently examined this patient on 3/6/2021, as outlined below: PHYSICAL EXAM: 
General: WD, WN. Alert, cooperative, no acute distress, mild to moderate dyspnea at rest    
EENT:  EOMI. Anicteric sclerae. MMM Resp:  Bilateral air entry present, crackles present, no wheezing CV:  Regular  rhythm,  No edema GI:  Soft, Non distended, Non tender. +Bowel sounds Neurologic:  Alert and oriented X 3, normal speech, Psych:   Not anxious nor agitated Skin:  No rashes. No jaundice Reviewed most current lab test results and cultures  YES Reviewed most current radiology test results   YES Review and summation of old records today    NO Reviewed patient's current orders and MAR    YES 
PMH/SH reviewed - no change compared to H&P 
________________________________________________________________________ Care Plan discussed with: 
  Comments Patient y Family RN y   
Care Manager Consultant Multidiciplinary team rounds were held today with , nursing, pharmacist and clinical coordinator. Patient's plan of care was discussed; medications were reviewed and discharge planning was addressed. ________________________________________________________________________ Total NON critical care TIME:  35  Minutes Total CRITICAL CARE TIME Spent:   Minutes non procedure based Comments >50% of visit spent in counseling and coordination of care y   
________________________________________________________________________ Donato Bhatia MD  
 
Procedures: see electronic medical records for all procedures/Xrays and details which were not copied into this note but were reviewed prior to creation of Plan. LABS: 
I reviewed today's most current labs and imaging studies. Pertinent labs include: 
Recent Labs 03/05/21 
2270 WBC 3.8 HGB 10.8* HCT 38.1  Recent Labs 03/05/21 
5730 03/04/21 
0022  141  
K 3.9 4.0  
* 113* CO2 22 21 * 182* BUN 28* 26* CREA 2.20* 2.10* CA 8.7 8.7 Signed: Donato Bhatia MD

## 2021-03-07 NOTE — PROGRESS NOTES
End of Shift Note Bedside shift change report given to Margarita Villarreal (oncoming nurse) by Bryant Sandifer, RN (offgoing nurse). Report included the following information SBAR Shift worked:  DAYS Shift summary and any significant changes:  
 -PERIODIC CONFUSION 
-NEW IV Concerns for physician to address:  NONE Zone phone for oncoming shift:   [phone] Activity: 
Activity Level: Bed Rest 
Number times ambulated in hallways past shift: 0 Number of times OOB to chair past shift: 0 Cardiac:  
Cardiac Monitoring: Yes     
Cardiac Rhythm: Paced Access:  
Current line(s): PIV Genitourinary:  
Urinary status: voiding and external catheter Respiratory:  
O2 Device: Room air Chronic home O2 use?: NO Incentive spirometer at bedside: NO 
  
GI: 
Last Bowel Movement Date: 03/05/21 Current diet:  DIET CARDIAC Regular; FR 1200ML Passing flatus: YES Tolerating current diet: YES 
% Diet Eaten: 40 % Pain Management:  
Patient states pain is manageable on current regimen: YES Skin: 
Valdemar Score: 17 Interventions: float heels, PT/OT consult and internal/external urinary devices Patient Safety: 
Fall Score: Total Score: 1 Interventions: bed/chair alarm, assistive device (walker, cane, etc) and pt to call before getting OOB High Fall Risk: Yes Length of Stay: 
Expected LOS: 4d 0h 
Actual LOS: 6 Bryant Sandifer, RN

## 2021-03-07 NOTE — PROGRESS NOTES
End of Shift Note Bedside shift change report given to Dipika Albert (oncoming nurse) by Sahra Anne RN (offgoing nurse). Report included the following information SBAR Shift worked:  DAYS Shift summary and any significant changes:  
 -PERIODIC CONFUSION 
-POSSIBLE D/C TOMORROW MORNING Concerns for physician to address:  NONE Zone phone for oncoming shift:  [phone] Activity: 
Activity Level: Bed Rest 
Number times ambulated in hallways past shift: 0 Number of times OOB to chair past shift: 0 Cardiac:  
Cardiac Monitoring: Yes     
Cardiac Rhythm: Paced Access:  
Current line(s): PIV Genitourinary:  
Urinary status: voiding and external catheter Respiratory:  
O2 Device: Room air Chronic home O2 use?: NO Incentive spirometer at bedside: NO 
  
GI: 
Last Bowel Movement Date: 03/06/21 Current diet:  DIET CARDIAC Regular; FR 1200ML Passing flatus: YES Tolerating current diet: YES 
% Diet Eaten: 40 % Pain Management:  
Patient states pain is manageable on current regimen: YES Skin: 
Valdemra Score: 16 Interventions: float heels, PT/OT consult and internal/external urinary devices Patient Safety: 
Fall Score: Total Score: 2 Interventions: bed/chair alarm, assistive device (walker, cane, etc) and pt to call before getting OOB High Fall Risk: Yes Length of Stay: 
Expected LOS: 4d 0h 
Actual LOS: 7 Sahra Anne RN

## 2021-03-07 NOTE — PROGRESS NOTES
[]Hide copied text []Ronny for details End of Shift Note 
  Bedside shift change report given to Douglas Vargas (oncoming nurse) by Cristobal Calzada  (offgoing nurse). Report included the following information SBAR 
  
Shift worked:  nights  
   
Shift summary and any significant changes:  
  -PERIODIC CONFUSION 
-NEW IV 
   
Concerns for physician to address:  NONE Zone phone for oncoming shift:   [phone] 
  
   
  
Activity: 
Activity Level: Bed Rest 
Number times ambulated in hallways past shift: 0 Number of times OOB to chair past shift: 0 
  
Cardiac:  
Cardiac Monitoring: Yes     
Cardiac Rhythm: Paced 
  
Access:  
Current line(s): PIV  
  
Genitourinary:  
Urinary status: voiding and external catheter 
  
Respiratory:  
O2 Device: Room air Chronic home O2 use?: NO Incentive spirometer at bedside: NO 
GI: 
Last Bowel Movement Date: 03/05/21 Current diet:  DIET CARDIAC Regular; FR 1200ML Passing flatus: YES Tolerating current diet: YES 
% Diet Eaten: 40 % 
  
Pain Management:  
Patient states pain is manageable on current regimen: YES 
  
Skin: 
Valdemar Score: 17 Interventions: float heels, PT/OT consult and internal/external urinary devices Patient Safety: 
Fall Score: Total Score: 1 Interventions: bed/chair alarm, assistive device (walker, cane, etc) and pt to call before getting OOB High Fall Risk:  Yes 
  
Length of Stay: 
Expected LOS: 4d 0h 
Actual LOS:   Linnea Orantes, RN

## 2021-03-07 NOTE — PROGRESS NOTES
Hospitalist Progress Note NAME: Epi Bautista :  1960 MRN:  [id number] Assessment / Plan: 
Acute on chronic diastolic congestive heart failure poa  
-Last echo shows ejection fraction of 55% 
- admission weight was 298 pounds and weight now at 261 pounds. Per cards her baseline weight is close to 245 pounds. 
-Continue Bumex 2  mg IV twice daily. Cont metolazone for few more days. Continue Coreg. Check BMP in a.m. 
-Cardiology is following PAF/ History of SSS s/p ablation and leadless PM insertion HTN  
-Continue Coreg 
-warfarin discontinued by cardiology and started on Eliquis. Continue added Eliquis. 
-Cardiology is following CKD 4 
-Baseline creatinine is around 1.9-2.0. Creatinine is 2.1 on monitor daily 
-Currently creatinine is close to baseline. Diabetes mellitus: Continue sliding scale insulin and diabetic diet. Recent A1c was 6.6. Anxiety: cont home meds Left BKA 2020 Disposition: 
Discharge in a.m. tomorrow Body mass index is 42.83 kg/m².: 40 or above Morbid obesity Code Status: Full code Surrogate Decision Maker:  DVT Prophylaxis: Eliquis Baseline: independent  lives with her , ambulates with prosthesis Recommended Disposition:  Home pending clinical progress Subjective: Chief Complaint / Reason for Physician Visit: FU HF Shortness of breath is better. Weight is down to 61 pounds No cough or phlegm Objective: VITALS:  
Last 24hrs VS reviewed since prior progress note. Most recent are: 
Patient Vitals for the past 24 hrs: 
 Temp Pulse Resp BP SpO2  
21 0333 97.8 °F (36.6 °C) 90 16 125/68 95 % 21 0034 98.1 °F (36.7 °C) 90 16 135/77 96 % 21 1918 98.1 °F (36.7 °C) 92 18 (!) 122/58 96 % 21 1605 97.4 °F (36.3 °C) 91 18 (!) 155/88 100 % 21 1140 97.5 °F (36.4 °C) 90 18 139/84 95 % Intake/Output Summary (Last 24 hours) at 3/7/2021 4377 Last data filed at 3/7/2021 7159 Gross per 24 hour Intake 220 ml Output 3370 ml Net -3150 ml I had a face to face encounter and independently examined this patient on 3/7/2021, as outlined below: PHYSICAL EXAM: 
General: WD, WN. Alert, cooperative, no acute distress, mild to moderate dyspnea at rest    
EENT:  EOMI. Anicteric sclerae. MMM Resp:  Bilateral air entry present, crackles present, no wheezing CV:  Regular  rhythm,  No edema GI:  Soft, Non distended, Non tender. +Bowel sounds Neurologic:  Alert and oriented X 3, normal speech, Psych:   Not anxious nor agitated Skin:  No rashes. No jaundice Reviewed most current lab test results and cultures  YES Reviewed most current radiology test results   YES Review and summation of old records today    NO Reviewed patient's current orders and MAR    YES 
PMH/SH reviewed - no change compared to H&P 
________________________________________________________________________ Care Plan discussed with: 
  Comments Patient y Family RN y   
Care Manager Consultant Multidiciplinary team rounds were held today with , nursing, pharmacist and clinical coordinator. Patient's plan of care was discussed; medications were reviewed and discharge planning was addressed. ________________________________________________________________________ Total NON critical care TIME:  35  Minutes Total CRITICAL CARE TIME Spent:   Minutes non procedure based Comments >50% of visit spent in counseling and coordination of care y   
________________________________________________________________________ Roman Taylor MD  
 
Procedures: see electronic medical records for all procedures/Xrays and details which were not copied into this note but were reviewed prior to creation of Plan. LABS: 
I reviewed today's most current labs and imaging studies. Pertinent labs include: 
Recent Labs   03/07/21 
0241 03/05/21 
9942 WBC 4.7 3.8 HGB 11.5 10.8* HCT 38.9 38.1  225 Recent Labs 03/07/21 
0241 03/05/21 
9193  141  
K 3.4* 3.9  113* CO2 26 22 * 164* BUN 30* 28* CREA 2.15* 2.20* CA 8.8 8.7 Signed: Festus Reeves MD

## 2021-03-07 NOTE — PROGRESS NOTES
3/7/2021 9:59 AM 
 
Admit Date: 2/28/2021 Admit Diagnosis: Acute respiratory failure ([facility] [J96.00] Subjective:  
 
Yoni Mohamud   denies chest pain, chest pressure/discomfort, dyspnea, palpitations, irregular heart beats, near-syncope, syncope, fatigue, orthopnea, paroxysmal nocturnal dyspnea, exertional chest pressure/discomfort. Visit Vitals /68 (BP 1 Location: Left arm, BP Patient Position: Lying) Pulse 90 Temp 97.8 °F (36.6 °C) Resp 16 Ht 5' 10\" (1.778 m) Wt 261 lb 11.2 oz (118.7 kg) SpO2 95% BMI 37.55 kg/m² Current Facility-Administered Medications Medication Dose Route Frequency  potassium chloride SR (KLOR-CON 10) tablet 40 mEq  40 mEq Oral NOW  metOLazone (ZAROXOLYN) tablet 2.5 mg  2.5 mg Oral DAILY  apixaban (ELIQUIS) tablet 5 mg  5 mg Oral Q12H  pantoprazole (PROTONIX) tablet 40 mg  40 mg Oral ACB&D  
 sodium chloride (NS) flush 5-40 mL  5-40 mL IntraVENous Q8H  
 sodium chloride (NS) flush 5-40 mL  5-40 mL IntraVENous PRN  
 acetaminophen (TYLENOL) tablet 650 mg  650 mg Oral Q6H PRN Or  
 acetaminophen (TYLENOL) suppository 650 mg  650 mg Rectal Q6H PRN  polyethylene glycol (MIRALAX) packet 17 g  17 g Oral DAILY PRN  promethazine (PHENERGAN) tablet 12.5 mg  12.5 mg Oral Q6H PRN Or  
 ondansetron (ZOFRAN) injection 4 mg  4 mg IntraVENous Q6H PRN  
 oxyCODONE IR (ROXICODONE) tablet 5 mg  5 mg Oral Q4H PRN  
 oxyCODONE IR (ROXICODONE) tablet 10 mg  10 mg Oral Q4H PRN  
 albuterol-ipratropium (DUO-NEB) 2.5 MG-0.5 MG/3 ML  3 mL Nebulization Q6H PRN  
 bumetanide (BUMEX) injection 2 mg  2 mg IntraVENous BID  sertraline (ZOLOFT) tablet 125 mg  125 mg Oral DAILY  busPIRone (BUSPAR) tablet 10 mg  10 mg Oral BID  carvediloL (COREG) tablet 12.5 mg  12.5 mg Oral BID WITH MEALS  
 hydrALAZINE (APRESOLINE) 20 mg/mL injection 10 mg  10 mg IntraVENous Q2H PRN Objective:  
  
Visit Vitals /68 (BP 1 Location: Left arm, BP Patient Position: Lying) Pulse 90 Temp 97.8 °F (36.6 °C) Resp 16 Ht 5' 10\" (1.778 m) Wt 261 lb 11.2 oz (118.7 kg) SpO2 95% BMI 37.55 kg/m² Physical Exam: 
Resting comfortably. No resp distress. Extremities: left BKA Heart: regular rate and rhythm, S1, S2 normal, no murmur, click, rub or gallop Lungs: clear to auscultation bilaterally Neurologic: Grossly normal 
 
Data Review:  
Labs:   
Recent Results (from the past 24 hour(s)) CBC W/O DIFF Collection Time: 03/07/21  2:41 AM  
Result Value Ref Range WBC 4.7 3.6 - 11.0 K/uL  
 RBC 4.47 3.80 - 5.20 M/uL  
 HGB 11.5 11.5 - 16.0 g/dL HCT 38.9 35.0 - 47.0 % MCV 87.0 80.0 - 99.0 FL  
 MCH 25.7 (L) 26.0 - 34.0 PG  
 MCHC 29.6 (L) 30.0 - 36.5 g/dL RDW 25.6 (H) 11.5 - 14.5 % PLATELET 238 796 - 701 K/uL MPV 11.1 8.9 - 12.9 FL  
 NRBC 0.0 0  WBC ABSOLUTE NRBC 0.00 0.00 - 0.01 K/uL METABOLIC PANEL, BASIC Collection Time: 03/07/21  2:41 AM  
Result Value Ref Range Sodium 140 136 - 145 mmol/L Potassium 3.4 (L) 3.5 - 5.1 mmol/L Chloride 108 97 - 108 mmol/L  
 CO2 26 21 - 32 mmol/L Anion gap 6 5 - 15 mmol/L Glucose 176 (H) 65 - 100 mg/dL BUN 30 (H) 6 - 20 MG/DL Creatinine 2.15 (H) 0.55 - 1.02 MG/DL  
 BUN/Creatinine ratio 14 12 - 20 GFR est AA 28 (L) >60 ml/min/1.73m2 GFR est non-AA 23 (L) >60 ml/min/1.73m2 Calcium 8.8 8.5 - 10.1 MG/DL Telemetry: paced Assessment:  
 
Principal Problem: 
  Acute respiratory failure ([address] (12/20/2020) Active Problems: S/P atrioventricular rolando ablation (11/17/2020) S/P cardiac pacemaker procedure (4/3/2012) Overview: 4/2/12 Medtronic dual chamber pacemaker implant Pacer removed April 2020 due vegetations related to diabetic foot  
    osteomyelitis. Mixed hyperlipidemia (5/22/2012) CKD (chronic kidney disease), stage IV ([facility] (6/9/2012) Overview: Acute on chronic, Dr. Tomas Queen Essential hypertension (8/26/2016) Morbid obesity with BMI of 40.0-44.9, adult ([facility] (5/1/2017) Plan:  
 
Acute on chronic diastolic HF/HFpEF: (EF 33-49%) Continue current meds. -ve fluid balance. Plans for dc noted.   
 
PAF with AV rolando ablation/Medtronic pacemaker: 
Continue current meds.  
  
Chronic CKD: 
stable 
  
HTN: 
Controlled with current therapy

## 2021-03-08 NOTE — PROGRESS NOTES
Comprehensive Nutrition Assessment Type and Reason for Visit: Initial, RD nutrition re-screen/LOS Nutrition Recommendations/Plan: 
Consistent carb/cardiac diet Nutrition Assessment:    
Patient medically noted for respiratory failure and CHF. PMH CKD, HTN, DM, and left BKA. Chart reviewed for length of stay. Stable for discharge awaiting insurance authorization. Weight down with diuresis. Added CCD to diet order to aid in BG control. Encourage intake of meals/snacks. Estimated Daily Nutrient Needs: 
Energy (kcal): 2021 kcal (BMR 1785 x 1. 3AF -300kcal); Weight Used for Energy Requirements: Current Protein (g): 91g (0.8 g/kg bw); Weight Used for Protein Requirements: Current Fluid (ml/day): 2000 mL; Method Used for Fluid Requirements: 1 ml/kcal 
 
Nutrition Related Findings:      
[phone], A1c 6.7 
1-2+ edema BM 3/6 Bumex, Buspar, Coreg, Protonix, Zoloft Wounds:   
None Current Nutrition Therapies: DIET CARDIAC Regular; FR 1200ML Anthropometric Measures: 
· Height:  5' 10\" (177.8 cm) · Current Body Wt:  113.6 kg (250 lb 7.1 oz) · BMI Category:  Obese class 2 (BMI 35.0-39. 9) Nutrition Diagnosis: · No nutrition diagnosis at this time Nutrition Interventions:  
Food and/or Nutrient Delivery: Modify current diet Nutrition Education and Counseling: No recommendations at this time Coordination of Nutrition Care: No recommendation at this time Goals: 
PO intake >50% of meals/snacks next 5-7 days Nutrition Monitoring and Evaluation:  
Behavioral-Environmental Outcomes: None identified Food/Nutrient Intake Outcomes: Food and nutrient intake Physical Signs/Symptoms Outcomes: Biochemical data, Weight, Fluid status or edema Discharge Planning:   
(Consistent carb/cardiac) Electronically signed by Anton Buck RD on 3/8/2021 at 3:25 PM 
 
Contact: ext [phone]

## 2021-03-08 NOTE — PROGRESS NOTES
Transition of Care Plan: 
  
Disposition: [facility]) pending insurance auth [facility] to initiate auth this afternoon (3/8/21) Follow up appointments: PCP, cardiology DME needed: Defer to rehab Transportation at Discharge: BLS medical transport; EMTALA to be completed Keys or means to access home:  Yes IM Medicare letter: [employer]; 2nd IM not needed Caregiver Contact: Pt's spouse Sue Mishrat: [phone]) Discharge Caregiver contacted prior to discharge? At d/c 
COVID-19 test per placement protocol: Pt will need COVID-19 test completed per placement protocol; MD to order, RN to complete Update - 3:20 PM: CM received update that [facility] for [facility] pending insurance auth. [facility] to initiate insurance auth this afternoon (3/8/21); CM to update delay in d/c. CM met with pt at bedside to check in and report update. Upon conducting room visit, pt presented as A&O x4. CM provided update regarding [facility] acceptance (pending insurance auth) & inquired if pt had any immediate questions or concerns; pt declined. Update - 1:56 PM: CM sent referrals via [employer] to [facility], [address] ([address] [place] [address]), and [facility] for review; referrals pending. Pt will requiring insurance auth for placement; updated PT/OT notes needed until auth is obtained. CM will f/u with pt's RN to ensure COVID-19 test is completed asap. Update - 1:48 PM: CM contacted pt via bedside phone to check in and discuss in-pt rehab placement. CM explained the intervention in-depth & answered f/u questions as needed. Pt confirmed being agreeable to moving forward with in-pt rehab placement and identified [facility] as her preference in placement. CM acknowledged pt's preference for [facility] but urged her to consider other options in the [place] area to ensure a timely placement; pt agreeable. CM received pt's verbal consent to send referrals via [employer] to [facility], [place], [facility] ([address] [place] [address]), and [address] [place] [address] for review. CM will report updates on the status of referrals once available. Initial note: CM acknowledged d/c. CM reviewed pt's chart & consulted with attending MD in IDR prior to moving forward with d/c planning. CM requested COVID-19 test in IDR in anticipation for rehab placement; MD to order, RN to complete. PT/OT recommendations indicate pt will need in-pt rehab placement at d/c. CM will meet with pt at bedside to check in and discuss in-pt rehab intervention more in-depth. CM will provide list of in-pt rehab facilities and send referrals once preference is identified. CM to enter a delay in d/c to reflect the current barriers preventing pt from transitioning out of [facility]. CM will continue to follow & report updates once available. Roxanne Aviles MSW Care Manager, [facility] 
[phone]

## 2021-03-08 NOTE — PROGRESS NOTES
Problem: Falls - Risk of 
Goal: *Absence of Falls Description: Document Wilner Stewart Fall Risk and appropriate interventions in the flowsheet. Outcome: Progressing Towards Goal 
Note: Fall Risk Interventions: 
Mobility Interventions: Bed/chair exit alarm Mentation Interventions: Bed/chair exit alarm Medication Interventions: Patient to call before getting OOB, Teach patient to arise slowly, Bed/chair exit alarm, Assess postural VS orthostatic hypotension Elimination Interventions: Bed/chair exit alarm, Call light in reach Problem: Pressure Injury - Risk of 
Goal: *Prevention of pressure injury Description: Document Valdemar Scale and appropriate interventions in the flowsheet. Outcome: Progressing Towards Goal 
Note: Pressure Injury Interventions: 
Sensory Interventions: Assess changes in LOC Moisture Interventions: Limit adult briefs, Minimize layers, Absorbent underpads Activity Interventions: PT/OT evaluation Mobility Interventions: PT/OT evaluation Nutrition Interventions: Document food/fluid/supplement intake Friction and Shear Interventions: Minimize layers, HOB 30 degrees or less Problem: Diabetes Self-Management Goal: *Disease process and treatment process Description: Define diabetes and identify own type of diabetes; list 3 options for treating diabetes. Outcome: Progressing Towards Goal 
Goal: *Incorporating nutritional management into lifestyle Description: Describe effect of type, amount and timing of food on blood glucose; list 3 methods for planning meals. Outcome: Progressing Towards Goal 
Goal: *Incorporating physical activity into lifestyle Description: State effect of exercise on blood glucose levels. Outcome: Progressing Towards Goal 
Goal: *Developing strategies to promote health/change behavior Description: Define the ABC's of diabetes; identify appropriate screenings, schedule and personal plan for screenings. Outcome: Progressing Towards Goal 
Goal: *Using medications safely Description: State effect of diabetes medications on diabetes; name diabetes medication taking, action and side effects. Outcome: Progressing Towards Goal 
Goal: *Monitoring blood glucose, interpreting and using results Description: Identify recommended blood glucose targets  and personal targets. Outcome: Progressing Towards Goal 
Goal: *Prevention, detection, treatment of acute complications Description: List symptoms of hyper- and hypoglycemia; describe how to treat low blood sugar and actions for lowering  high blood glucose level. Outcome: Progressing Towards Goal 
Goal: *Prevention, detection and treatment of chronic complications Description: Define the natural course of diabetes and describe the relationship of blood glucose levels to long term complications of diabetes. Outcome: Progressing Towards Goal 
Goal: *Developing strategies to address psychosocial issues Description: Describe feelings about living with diabetes; identify support needed and support network Outcome: Progressing Towards Goal 
Goal: *Insulin pump training Outcome: Progressing Towards Goal 
Goal: *Sick day guidelines Outcome: Progressing Towards Goal 
Goal: *Patient Specific Goal (EDIT GOAL, INSERT TEXT) Outcome: Progressing Towards Goal

## 2021-03-08 NOTE — PROGRESS NOTES
Problem: Mobility Impaired (Adult and Pediatric) 
Goal: *Acute Goals and Plan of Care (Insert Text) 
Description: FUNCTIONAL STATUS PRIOR TO ADMISSION: Patient was modified independent using a rolling walker for functional mobility. L BKA in 5/2020 and wears prosthesis. Uses wheelchair for long distances. If prosthesis doesn't fit, pt uses slide board for transfers 
 
HOME SUPPORT PRIOR TO ADMISSION: The patient lived with spouse and required some assistance (bed mobility, transfers at times). Independent ADLs 
 
Physical Therapy Goals 
Initiated 3/3/2021 
1.  Patient will move from supine to sit and sit to supine  in bed with minimal assistance/contact guard assist within 7 day(s).   
2.  Patient will transfer from bed to chair and chair to bed with minimal assistance/contact guard assist using the least restrictive device within 7 day(s). 
3.  Patient will perform sit to stand with minimal assistance/contact guard assist within 7 day(s). 
4.  Patient will ambulate with minimal assistance/contact guard assist for 50 feet with the least restrictive device within 7 day(s).  
 
 
 
Outcome: Progressing Towards Goal 
 PHYSICAL THERAPY TREATMENT 
Patient: Lashaun Winn (60 y.o. female) 
Date: 3/8/2021 
Diagnosis: Acute respiratory failure (HCC) [J96.00] Acute respiratory failure (HCC) 
    
Precautions: Fall, Contact(L BKA, ESBL) 
Chart, physical therapy assessment, plan of care and goals were reviewed. 
 
ASSESSMENT 
Patient continues with skilled PT services and is progressing towards goals. Pt received in supine with  in place L residual limb. Able to don L LE prosthesis and R shoe EOB with assist. Pt tolerated standing x 2 trials with RW and 2 person assist. Reported L LE pain in standing, unable to initiate stepping or pivot to chair due to pain/ weakness.  
 
Pt demo good effort with all functional tasks. Remains below functional baseline with increased risk for fall. Pt demo good rehab potential; will  benefit from mobility progression with acute PT followed by IPR to facilitate increased indep and safety with mobility. Current Level of Function Impacting Discharge (mobility/balance): bed mob mod A, sit to stand maxA x 2, static stand with prosthesis and RW CGA/ min A x 2 Other factors to consider for discharge: pt continues to require 2 person assist, unable to safely return home at this time PLAN : 
Patient continues to benefit from skilled intervention to address the above impairments. Continue treatment per established plan of care. to address goals. Recommendation for discharge: (in order for the patient to meet his/her long term goals) Therapy 3 hours per day 5-7 days per week This discharge recommendation: 
Has been made in collaboration with the attending provider and/or case management IF patient discharges home will need the following DME: patient owns DME required for discharge SUBJECTIVE:  
Patient stated I don't want to be here, but I can't go home- I can't walk yet.  OBJECTIVE DATA SUMMARY:  
Critical Behavior: 
Neurologic State: Alert Orientation Level: Oriented X4 Cognition: Follows commands Safety/Judgement: Fall prevention Functional Mobility Training: 
Bed Mobility: 
Rolling: Minimum assistance Supine to Sit: Minimum assistance; Moderate assistance Sit to Supine: Moderate assistance Scooting: Moderate assistance Transfers: 
Sit to Stand: Maximum assistance;Assist x2(first attempt, min/moderate assist 2nd attempt) Stand to Sit: Moderate assistance; Additional time;Assist x2 Bed to Chair: (unable to ambulate or side step) Balance: 
Sitting - Static: Good (unsupported) Sitting - Dynamic: Fair (occasional) Standing: Impaired Standing - Static: Constant support; Kaila Renae Ambulation/Gait Training: 
  
 Pt unable Pain Rating: 
Pt reports pain L residual limb which increased with WBing Activity Tolerance:  
Good After treatment patient left in no apparent distress:  
Supine in bed, Call bell within reach, Bed / chair alarm activated, and Side rails x 3 
 
COMMUNICATION/COLLABORATION:  
The patients plan of care was discussed with: Occupational therapist and Registered nurse. Kinga Swartz, PT Time Calculation: 20 mins

## 2021-03-08 NOTE — PROGRESS NOTES
Problem: Self Care Deficits Care Plan (Adult) Goal: *Acute Goals and Plan of Care (Insert Text) Description: FUNCTIONAL STATUS PRIOR TO ADMISSION: Patient was modified independent using a rolling walker for functional mobility. Was bathing seated on tub transfer bench and perform all ADLs without assist.  Cooks. When prothesis does not fit pt slide board transfers from wheelchair on her own. 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 Occupational Therapy Goals: 
Initiated 3/3/2021 1. Patient will perform grooming seated without back support and independence within 7 days. 2. Patient will perform lower body dressing to include prosthesis with supervision within 7 days. 3. Patient will perform toileting with supervision within 7 days. 4. Patient will transfer from bedside commode with supervision using the least restrictive device and appropriate durable medical equipment within 7 days. Outcome: Progressing Towards Goal 
 OCCUPATIONAL THERAPY TREATMENT Patient: Lavella Harada (84 y.o. female) Date: 3/8/2021 Diagnosis: Acute respiratory failure ([address] [J96.00] Acute respiratory failure ([address] Precautions: Fall, Contact(L BKA, ESBL) Chart, occupational therapy assessment, plan of care, and goals were reviewed. ASSESSMENT Patient continues with skilled OT services and is progressing towards goals. Less assist needed for supine to sit EOB and improved EOB balance. Pt needs max assist for LB dressing to include prosthesis. She was however able to get prothesis to click in today. Pt has been limited with all mobility due to swelling and weakness. Max assist needed for sit to stand (from elevated bed surface) with rocking technique and pt was unable to side step or mobilize forward due to weakness due to prolonged immobility. Multiple attempts had been made last week to get pts prothesis donned. Pt was unable to don prothesis due to swelling and was also having difficulty with scooting. She has lost 60# of water weight due to diuretics and is now able to don prothesis, but has lost her ability to ambulate and perform ADLS due to weakness. She is very motivated to regain her independence and is extremely frustrated that she has lost her ability to function on her own. Continue to recommend inpatient rehab at discharge. Current Level of Function Impacting Discharge (ADLs):  
Bed Mobility: 
Rolling: Minimum assistance Supine to Sit: Minimum assistance; Moderate assistance Sit to Supine: Moderate assistance Scooting: Moderate assistance Transfers: 
Sit to Stand: Maximum assistance;Assist x2(first attempt, min/moderate assist 2nd attempt) Bed to Chair: (unable to ambulate or side step) Lower Body Dressing Assistance Dressing Assistance: Moderate assistance(left LLE prosthesis) Socks: Minimum assistance Shoes with Cloth Laces: Maximum assistance(seated EOB crossed leg technique) Toileting Bladder Hygiene: Total assistance (dependent Other factors to consider for discharge: was ambulatory and performing ADLS on her own PLAN : 
Patient continues to benefit from skilled intervention to address the above impairments. Continue treatment per established plan of care. to address goals. Recommend with staff: assist with turning in bed Recommend next OT session: sit to stand, standing balance, ADLs and transfer attempts Recommendation for discharge: (in order for the patient to meet his/her long term goals) Therapy 3 hours per day 5-7 days per week This discharge recommendation: 
Has been made in collaboration with the attending provider and/or case management IF patient discharges home will need the following DME: needs rehab SUBJECTIVE:  
Patient stated Damn it! I can't walk now! I wanted to go home.  OBJECTIVE DATA SUMMARY:  
Cognitive/Behavioral Status: 
Neurologic State: Alert Orientation Level: Oriented X4 Cognition: Follows commands Perception: Appears intact Perseveration: No perseveration noted Safety/Judgement: Fall prevention Functional Mobility and Transfers for ADLs: 
Bed Mobility: 
Rolling: Minimum assistance Supine to Sit: Minimum assistance; Moderate assistance Sit to Supine: Moderate assistance Scooting: Moderate assistance Transfers: 
Sit to Stand: Maximum assistance;Assist x2(first attempt, min/moderate assist 2nd attempt) Bed to Chair: (unable to ambulate or side step) Balance: 
Sitting - Static: Good (unsupported) Sitting - Dynamic: Fair (occasional) Standing: Impaired Standing - Static: Constant support; Yamileth Lime ADL Intervention: Lower Body Dressing Assistance Dressing Assistance: Moderate assistance(left LLE prosthesis) Socks: Minimum assistance Shoes with Cloth Laces: Maximum assistance(seated EOB crossed leg technique) Toileting Bladder Hygiene: Total assistance (dependent) Cognitive Retraining Safety/Judgement: Fall prevention Pain: 
Left residual limb pain Activity Tolerance:  
Fair and requires frequent rest breaks, bed alarm activated After treatment patient left in no apparent distress:  
Supine in bed and Call bell within reach COMMUNICATION/COLLABORATION:  
 The patients plan of care was discussed with: Physical therapist, Registered nurse, and patient . Otoniel Zamorano OTR/L Time Calculation: 26 mins

## 2021-03-08 NOTE — PROGRESS NOTES
Hospitalist Progress Note NAME: Erick Pandey :  1960 MRN:  [id number] This is a 27-year-old female with past medical is a diastolic congestive heart failure presents with shortness of breath and weight gain and was diuresed. She is feeling much better and is close to her baseline weight. She is cleared for discharge and waiting on inpatient rehab placement. Assessment / Plan: 
Acute on chronic diastolic congestive heart failure poa  
-Last echo shows ejection fraction of 55% 
- admission weight was 298 pounds and weight now at 250 pounds. Per cards her baseline weight is close to 245 pounds. 
-Continue Bumex 2  mg IV twice daily. Stop metolazone. Continue Coreg. Check BMP in a.m. 
-Cardiology is following PAF/ History of SSS s/p ablation and leadless PM insertion HTN  
-Continue Coreg 
-warfarin discontinued by cardiology and started on Eliquis. Continue added Eliquis. 
-Cardiology is following CKD 4 
-Baseline creatinine is around 1.9-2.0. Creatinine is 2.1 on monitor daily 
-Currently creatinine is close to baseline. Diabetes mellitus: Continue sliding scale insulin and diabetic diet. Recent A1c was 6.6. Anxiety: cont home meds Left BK 2020 
-Has prosthesis in place. Disposition: She is medically clear for discharge. Waiting on rehab placement. Check SARS-CoV-2. Body mass index is 42.83 kg/m².: 40 or above Morbid obesity Code Status: Full code Surrogate Decision Maker:  DVT Prophylaxis: Eliquis Baseline: independent  lives with her , ambulates with prosthesis Recommended Disposition:  Home pending clinical progress Subjective: Chief Complaint / Reason for Physician Visit: FU HF Shortness of breath is improved. Cough is better. No chest pain. Weight is down to 250 pounds Objective: VITALS:  
Last 24hrs VS reviewed since prior progress note.  Most recent are: 
Patient Vitals for the past 24 hrs: 
 Temp Pulse Resp BP SpO2  
03/08/21 1243 98.1 °F (36.7 °C) 88 18 (!) 149/67 95 % 03/08/21 0818 97.6 °F (36.4 °C) 88 18 134/78 96 % 03/08/21 0230 97.3 °F (36.3 °C) 90 20 137/77 93 % 03/07/21 2333 98.2 °F (36.8 °C) 90 20 (!) 148/87 91 % 03/07/21 1916 97.9 °F (36.6 °C) 90 18 (!) 155/89 91 % 03/07/21 1508 97.9 °F (36.6 °C) 89 20 (!) 161/89  Intake/Output Summary (Last 24 hours) at 3/8/2021 1428 Last data filed at 3/8/2021 1249 Gross per 24 hour Intake  Output 3500 ml Net -3500 ml I had a face to face encounter and independently examined this patient on 3/8/2021, as outlined below: PHYSICAL EXAM: 
General: WD, WN. Alert, cooperative, no acute distress, mild to moderate dyspnea at rest    
EENT:  EOMI. Anicteric sclerae. MMM Resp:  Bilateral air entry present, crackles present, no wheezing CV:  Regular  rhythm,  No edema GI:  Soft, Non distended, Non tender. +Bowel sounds Neurologic:  Alert and oriented X 3, normal speech, Psych:   Not anxious nor agitated Skin:  No rashes. No jaundice Reviewed most current lab test results and cultures  YES Reviewed most current radiology test results   YES Review and summation of old records today    NO Reviewed patient's current orders and MAR    YES 
PMH/SH reviewed - no change compared to H&P 
________________________________________________________________________ Care Plan discussed with: 
  Comments Patient y Family RN y   
Care Manager Consultant Multidiciplinary team rounds were held today with , nursing, pharmacist and clinical coordinator. Patient's plan of care was discussed; medications were reviewed and discharge planning was addressed. ________________________________________________________________________ Total NON critical care TIME:  35  Minutes Total CRITICAL CARE TIME Spent:   Minutes non procedure based Comments >50% of visit spent in counseling and coordination of care y   
________________________________________________________________________ Jovita Baig MD  
 
Procedures: see electronic medical records for all procedures/Xrays and details which were not copied into this note but were reviewed prior to creation of Plan. LABS: 
I reviewed today's most current labs and imaging studies. Pertinent labs include: 
Recent Labs 03/07/21 
0241 WBC 4.7 HGB 11.5 HCT 38.9  Recent Labs 03/08/21 
5352 03/07/21 
0241  140  
K 3.5 3.4*  
 108 CO2 28 26 * 176* BUN 30* 30* CREA 2.21* 2.15* CA 8.6 8.8 Signed: Jovita Baig MD

## 2021-03-08 NOTE — PROGRESS NOTES
End of Shift Note Bedside shift change report given to Kenn Ventura  (oncoming nurse) by MAUREEN Melton (offgoing nurse). Report included the following information SBAR Shift worked:  [id number] Shift summary and any significant changes: PRN Oxy given x2 Concerns for physician to address:  None Zone phone for oncoming shift:   [phone] Patient Information Erick Pandey 63 y.o. 
2/28/2021  5:20 PM by Yesica Arroyo MD. Erick Pandey was admitted from Home 
 
Problem List 
Patient Active Problem List  
 Diagnosis Date Noted  S/P atrioventricular rolando ablation 11/17/2020 Priority: 1 - One  Gastroesophageal reflux disease without esophagitis 12/29/2020  Acute respiratory failure ([address] 12/20/2020  UTI (urinary tract infection) 12/20/2020  Pneumonia 12/20/2020  
 SOB (shortness of breath) 12/20/2020  CKD (chronic kidney disease) 12/20/2020  Anticoagulated 12/20/2020  PARAG (acute kidney injury) ([address] 12/20/2020  Fluid overload 12/20/2020  Subtherapeutic international normalized ratio (INR) 12/20/2020  Suspected COVID-19 virus infection 12/20/2020  Chest pain 11/23/2020  Left below-knee amputee ([address] 06/11/2020  H/O bilateral mastectomy 03/26/2020  Foot deformity, right 09/24/2019  Pes cavus 09/24/2019  Diabetic polyneuropathy associated with type 2 diabetes mellitus ([address] 11/13/2018  Morbid obesity with BMI of 40.0-44.9, adult ([address] 05/01/2017  Controlled type 2 diabetes mellitus with stage 4 chronic kidney disease, with long-term current use of insulin ([address] 01/16/2017  PAF (paroxysmal atrial fibrillation) ([address] 11/28/2016  Anemia in stage 4 chronic kidney disease ([address] 11/28/2016  Essential hypertension 08/26/2016  Systolic murmur 65/24/5270  CKD (chronic kidney disease), stage IV ([address] 06/09/2012  Mixed hyperlipidemia 05/22/2012  Long term (current) use of anticoagulants 04/11/2012  S/P cardiac pacemaker procedure 04/03/2012  Sick sinus syndrome ([facility] 04/02/2012  Status post ablation of atrial fibrillation 12/15/2011  Fe deficiency anemia 04/14/2010  
 History of breast cancer 04/13/2010  Asthma 04/13/2010 Past Medical History:  
Diagnosis Date  Acquired lymphedema Right arm  Acute respiratory failure ([address] 12/19/2020  Arrhythmia  Asthma  Atrial fibrillation ([facility] Dr. Diane Leon and Dr. Prabhjot Tao [facility])  Cancer ([address] bilateral breast  
 Chronic kidney disease  Diabetes mellitus type II   
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 
CHF:No No 
PNA:No No 
 
Activity: 
Activity Level: Bed Rest 
Number times ambulated in hallways past shift: 0 Number of times OOB to chair past shift: 0 Cardiac:  
Cardiac Monitoring: Yes     
Cardiac Rhythm: Paced Access:  
Current line(s): PIV Genitourinary:  
Urinary status: voiding and external catheter Respiratory:  
O2 Device: Room air Chronic home O2 use?: N/A Incentive spirometer at bedside: N/A 
  
 
GI: 
Last Bowel Movement Date: 03/06/21 Current diet:  DIET CARDIAC Regular; FR 1200ML Passing flatus: YES Tolerating current diet: YES 
% Diet Eaten: 40 % Pain Management:  
Patient states pain is manageable on current regimen: YES Skin: 
Valdemar Score: 16 Interventions: increase time out of bed, limit briefs and internal/external urinary devices Patient Safety: 
Fall Score: Total Score: 2 Interventions: bed/chair alarm and pt to call before getting OOB High Fall Risk: Yes DVT prophylaxis: DVT prophylaxis Med- Yes DVT prophylaxis SCD or LINA- No  
 
Wounds: (If Applicable) Wounds- Yes Location: Stage 1 PI sacrum Active Consults: 
IP CONSULT TO CARDIOLOGY 
IP CONSULT TO HOSPITALIST Length of Stay: 
Expected LOS: 4d 0h 
Actual LOS: 8 Discharge Plan: Yes; home tomorrow MAUREEN Calderon

## 2021-03-09 NOTE — PROGRESS NOTES
Problem: Falls - Risk of 
Goal: *Absence of Falls Description: Document Bowen Chata Fall Risk and appropriate interventions in the flowsheet. Outcome: Progressing Towards Goal 
Note: Fall Risk Interventions: 
Mobility Interventions: Bed/chair exit alarm Mentation Interventions: Bed/chair exit alarm Medication Interventions: Bed/chair exit alarm Elimination Interventions: Bed/chair exit alarm Problem: Pressure Injury - Risk of 
Goal: *Prevention of pressure injury Description: Document Valdemar Scale and appropriate interventions in the flowsheet. Outcome: Progressing Towards Goal 
Note: Pressure Injury Interventions: 
Sensory Interventions: Assess changes in LOC Moisture Interventions: Absorbent underpads Activity Interventions: Increase time out of bed Mobility Interventions: HOB 30 degrees or less Nutrition Interventions: Document food/fluid/supplement intake Friction and Shear Interventions: HOB 30 degrees or less Problem: Diabetes Self-Management Goal: *Disease process and treatment process Description: Define diabetes and identify own type of diabetes; list 3 options for treating diabetes. Outcome: Progressing Towards Goal 
Goal: *Incorporating nutritional management into lifestyle Description: Describe effect of type, amount and timing of food on blood glucose; list 3 methods for planning meals. Outcome: Progressing Towards Goal 
Goal: *Incorporating physical activity into lifestyle Description: State effect of exercise on blood glucose levels. Outcome: Progressing Towards Goal 
Goal: *Developing strategies to promote health/change behavior Description: Define the ABC's of diabetes; identify appropriate screenings, schedule and personal plan for screenings. Outcome: Progressing Towards Goal 
Goal: *Using medications safely Description: State effect of diabetes medications on diabetes; name diabetes medication taking, action and side effects. Outcome: Progressing Towards Goal 
Goal: *Monitoring blood glucose, interpreting and using results Description: Identify recommended blood glucose targets  and personal targets. Outcome: Progressing Towards Goal 
Goal: *Prevention, detection, treatment of acute complications Description: List symptoms of hyper- and hypoglycemia; describe how to treat low blood sugar and actions for lowering  high blood glucose level. Outcome: Progressing Towards Goal 
Goal: *Prevention, detection and treatment of chronic complications Description: Define the natural course of diabetes and describe the relationship of blood glucose levels to long term complications of diabetes. Outcome: Progressing Towards Goal 
Goal: *Developing strategies to address psychosocial issues Description: Describe feelings about living with diabetes; identify support needed and support network Outcome: Progressing Towards Goal 
Goal: *Insulin pump training Outcome: Progressing Towards Goal 
Goal: *Sick day guidelines Outcome: Progressing Towards Goal 
Goal: *Patient Specific Goal (EDIT GOAL, INSERT TEXT) Outcome: Progressing Towards Goal

## 2021-03-09 NOTE — PROGRESS NOTES
Received message from patient's nurse Anna Santizo stating : 
 
Patients potassium is 3.3 Discussion / orders: Potassium chloride 40 mEq orally x1 Please note that this note was dictated using Dragon computer voice recognition software. Quite often unanticipated grammatical, syntax, homophones, and other interpretive errors are inadvertently transcribed by the computer software. Please disregard these errors. Please excuse any errors that have escaped final proofreading.

## 2021-03-09 NOTE — PROGRESS NOTES
End of Shift Note Bedside shift change report given to Albert (oncoming nurse) by Claudean Redwood, GN (offgoing nurse). Report included the following information SBAR Shift worked:  [id number] Shift summary and any significant changes: PRN Oxy given x2; COVID test negative; Potassium 3.3 Concerns for physician to address:  None Zone phone for oncoming shift:   [phone] Patient Information Ester Weiner 63 y.o. 
2/28/2021  5:20 PM by Vidhi Cerda MD. Ester Weiner was admitted from Home 
 
Problem List 
Patient Active Problem List  
 Diagnosis Date Noted  S/P atrioventricular rolando ablation 11/17/2020 Priority: 1 - One  Gastroesophageal reflux disease without esophagitis 12/29/2020  Acute respiratory failure ([address] 12/20/2020  UTI (urinary tract infection) 12/20/2020  Pneumonia 12/20/2020  
 SOB (shortness of breath) 12/20/2020  CKD (chronic kidney disease) 12/20/2020  Anticoagulated 12/20/2020  PARGA (acute kidney injury) ([address] 12/20/2020  Fluid overload 12/20/2020  Subtherapeutic international normalized ratio (INR) 12/20/2020  Suspected COVID-19 virus infection 12/20/2020  Chest pain 11/23/2020  Left below-knee amputee ([address] 06/11/2020  H/O bilateral mastectomy 03/26/2020  Foot deformity, right 09/24/2019  Pes cavus 09/24/2019  Diabetic polyneuropathy associated with type 2 diabetes mellitus ([address] 11/13/2018  Morbid obesity with BMI of 40.0-44.9, adult ([address] 05/01/2017  Controlled type 2 diabetes mellitus with stage 4 chronic kidney disease, with long-term current use of insulin ([address] 01/16/2017  PAF (paroxysmal atrial fibrillation) ([address] 11/28/2016  Anemia in stage 4 chronic kidney disease ([address] 11/28/2016  Essential hypertension 08/26/2016  Systolic murmur 29/67/7381  CKD (chronic kidney disease), stage IV ([address] 06/09/2012  Mixed hyperlipidemia 05/22/2012  Long term (current) use of anticoagulants 04/11/2012  S/P cardiac pacemaker procedure 04/03/2012  Sick sinus syndrome ([facility] 04/02/2012  Status post ablation of atrial fibrillation 12/15/2011  Fe deficiency anemia 04/14/2010  
 History of breast cancer 04/13/2010  Asthma 04/13/2010 Past Medical History:  
Diagnosis Date  Acquired lymphedema Right arm  Acute respiratory failure ([address] 12/19/2020  Arrhythmia  Asthma  Atrial fibrillation ([facility] Dr. Dewayne Hong and [facility]. Lanre [facility])  Cancer ([address] bilateral breast  
 Chronic kidney disease  Diabetes mellitus type II   
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 
CHF:No No 
PNA:No No 
 
Activity: 
Activity Level: Bed Rest 
Number times ambulated in hallways past shift: 0 Number of times OOB to chair past shift: 0 Cardiac:  
Cardiac Monitoring: No     
Cardiac Rhythm: Paced Access:  
Current line(s): PIV Genitourinary:  
Urinary status: external catheter Respiratory:  
O2 Device: Room air Chronic home O2 use?: N/A Incentive spirometer at bedside: N/A 
  
 
GI: 
Last Bowel Movement Date: 03/06/21 Current diet:  DIET CARDIAC Regular; Consistent Carb 2000kcal; FR 1200ML Passing flatus: YES Tolerating current diet: YES 
% Diet Eaten: 50 % Pain Management:  
Patient states pain is manageable on current regimen: YES Skin: 
Valdemar Score: 16 Interventions: increase time out of bed, limit briefs and internal/external urinary devices Patient Safety: 
Fall Score: Total Score: 2 Interventions: bed/chair alarm, gripper socks and pt to call before getting OOB High Fall Risk: Yes DVT prophylaxis: DVT prophylaxis Med- Yes DVT prophylaxis SCD or LINA- No  
 
Wounds: (If Applicable) Wounds- Yes Location: stage 1 PI sacrum Active Consults: 
IP CONSULT TO CARDIOLOGY 
IP CONSULT TO HOSPITALIST Length of Stay: 
Expected LOS: 4d 0h 
Actual LOS: 9 Discharge Plan: Yes; SNF [facility]

## 2021-03-09 NOTE — PROGRESS NOTES
Covid PCR sent today for placement to IP rehab. Pt worked with PT and has not been able to get out of bed. Pt incontinent using the purewick. Bedside and Verbal shift change report given to GURINDER Franco (oncoming nurse) by Onesimo Lin (offgoing nurse). Report given with SBAR, Kardex, Intake/Output, MAR and Recent Results.

## 2021-03-09 NOTE — PROGRESS NOTES
Transition of Care Plan: 
  
Disposition: Plan A) I[facility]) pending insurance auth [facility] initiated auth (3/8/21); auth declined Plan B) SNF (pending insurance auth) Follow up appointments: PCP, cardiology DME needed: Defer to rehab Transportation at Discharge: BLS medical transport; PCS to be completed Keys or means to access home:  Yes   
IM Medicare letter: Commercial insurance; 2nd IM not needed Caregiver Contact: Pt's spouse Momo Fried: [phone]) Discharge Caregiver contacted prior to discharge?  At d/c 
COVID-19 test per placement protocol: Pt had COVID-19 test completed per placement protocol on 3/8/21; results negative Update - 1:30 PM: CM received update that pt's insurance declined in-pt rehab placement at [facility]. CM relayed update to MD who declined to complete P2P; MD urged CM to work on SNF placement for d/c. CM met with pt at bedside to check in and report update. Upon conducting room visit, pt presented as A&O x4. CM provided update & discussed utilizing SNF as an alternative plan for d/c. Pt confirmed being agreeable to utilizing SNF & identified [address] as her top preferences in placement. Pt inquired if she was going to be compensated for the events that took place last week; CM inquired what pt was referencing. Pt reported an elaborate story that allegedly took place last week at [address] from Thursday (3/4/21) - Saturday (3/6/21). Before providing specific details, the pt stated \"you're going to think I'm crazy. \" Pt's account of events was difficult to follow, however, she reported that it started with a \"hospital movie night on Thursday. \" Per pt, a \"man rented a room in the hospital from the nurses that were working. \" Pt also reported that there was a \"student loan gala\" on Friday, where \"the man got out of control & the event went down hill. \" CM consulted with MD regarding pt's report of events.  MD & CM met with pt at bedside where she re-counted the series of events. MD agreeable to placing psych consult. Initial note: CM acknowledged d/c. CM reviewed pt's chart to note updates prior to moving forward with d/c planning. Per chart, pt's COVID-19 test completed 3/8/21 came back negative. Pt was tentatively accepted to [facility] pending insurance auth. [facility] initiated insurance auth yesterday (3//8/21); auth currently pending. CM entered a delay in d/c to reflect the barriers preventing pt from transitioning out of [facility]. CM will place pt on will call with [facility] for possible d/c later today pending insurance auth/bed availability at [facility]. CM will continue to follow & report updates once available. Lenita Cogan, MSW Care Manager, [facility] 
[phone]

## 2021-03-09 NOTE — PROGRESS NOTES
Attempted to see pt for OT services.  Pt is declining therapy at this time.  Will follow up tomorrow for treatment.

## 2021-03-09 NOTE — PROGRESS NOTES
Hospitalist Progress Note NAME: Job White :  1960 MRN:  [id number] This is a 56-year-old female with past medical is a diastolic congestive heart failure presents with shortness of breath and weight gain and was diuresed. She is feeling much better and is close to her baseline weight. She is cleared for discharge and waiting on inpatient rehab placement. Assessment / Plan: 
Acute on chronic diastolic congestive heart failure poa  
-Last echo shows ejection fraction of 55% 
- admission weight was 298 pounds and weight now at 250 pounds. Per cards her baseline weight is close to 245 pounds. 
-Continue Bumex 2  mg IV twice daily. Stop metolazone. Continue Coreg. Check BMP in a.m. 
-Cardiology is following PAF/ History of SSS s/p ablation and leadless PM insertion HTN  
-Continue Coreg 
-warfarin discontinued by cardiology and started on Eliquis. Continue added Eliquis. 
-Cardiology is following CKD 4 
-Baseline creatinine is around 1.9-2.0. Creatinine is 2.1 on monitor daily 
-Currently creatinine is close to baseline. Diabetes mellitus: Continue sliding scale insulin and diabetic diet. Recent A1c was 6.6. Anxiety: cont home meds Left BK 2020 
-Has prosthesis in place. Disposition: 
Stable for discharge, Covid test negative. Declined further inpatient rehab, pending SNF placement. Body mass index is 42.83 kg/m².: 40 or above Morbid obesity Code Status: Full code Surrogate Decision Maker:  DVT Prophylaxis: Eliquis Baseline: independent  lives with her , ambulates with prosthesis Recommended Disposition:  Home pending clinical progress Subjective: Chief Complaint / Reason for Physician Visit: FU HF Patient seen today, doing well, on room air. Today patient states they give her some insulin likely seems delirious. Patient alert and oriented x4 though. No fever. Case management notified.  
 
 
 
 
Objective: VITALS:  
Last 24hrs VS reviewed since prior progress note. Most recent are: 
Patient Vitals for the past 24 hrs: 
 Temp Pulse Resp BP SpO2  
03/09/21 1619 97.4 °F (36.3 °C) 91 18 (!) 145/86 95 % 03/09/21 1229 97.9 °F (36.6 °C) 90 18 132/74 97 % 03/09/21 0832 97.6 °F (36.4 °C) 90 18 131/67 95 % 03/09/21 0327 97.8 °F (36.6 °C) 81 20 132/78 96 % 03/08/21 2341 97.7 °F (36.5 °C) 90 16 135/76 96 % 03/08/21 2013 98.1 °F (36.7 °C) 91 16 133/78 95 % Intake/Output Summary (Last 24 hours) at 3/9/2021 1734 Last data filed at 3/9/2021 1718 Gross per 24 hour Intake 915 ml Output 4550 ml Net -3635 ml I had a face to face encounter and independently examined this patient on 3/9/2021, as outlined below: PHYSICAL EXAM: 
General: WD, WN. Alert, cooperative, no acute distress, mild to moderate dyspnea at rest    
EENT:  EOMI. Anicteric sclerae. MMM Resp:  Bilateral air entry present, crackles present, no wheezing CV:  Regular  rhythm,  No edema GI:  Soft, Non distended, Non tender. +Bowel sounds Neurologic:  Alert and oriented X 3, normal speech, Psych:   Not anxious nor agitated Skin:  No rashes. No jaundice Reviewed most current lab test results and cultures  YES Reviewed most current radiology test results   YES Review and summation of old records today    NO Reviewed patient's current orders and MAR    YES 
PMH/SH reviewed - no change compared to H&P 
________________________________________________________________________ Care Plan discussed with: 
  Comments Patient y Family RN y   
Care Manager y Consultant     
                 y Multidiciplinary team rounds were held today with , nursing, pharmacist and clinical coordinator. Patient's plan of care was discussed; medications were reviewed and discharge planning was addressed. ________________________________________________________________________ Total NON critical care TIME:  35  Minutes Total CRITICAL CARE TIME Spent:   Minutes non procedure based Comments >50% of visit spent in counseling and coordination of care y   
________________________________________________________________________ James Bass MD  
 
Procedures: see electronic medical records for all procedures/Xrays and details which were not copied into this note but were reviewed prior to creation of Plan. LABS: 
I reviewed today's most current labs and imaging studies. Pertinent labs include: 
Recent Labs 03/09/21 
3637 03/07/21 
0241 WBC 5.4 4.7 HGB 11.4* 11.5 HCT 39.5 38.9  233 Recent Labs 03/09/21 
1710 03/08/21 
2785 03/07/21 
0241  139 140  
K 3.3* 3.5 3.4*  
 104 108 CO2 31 28 26 * 168* 176* BUN 30* 30* 30* CREA 2.23* 2.21* 2.15* CA 8.8 8.6 8.8 Signed: James Bass MD

## 2021-03-10 NOTE — PROGRESS NOTES
End of Shift Note 
  Bedside shift change report given to Rakesh Jayde, [facility] (oncoming nurse) by Suyapa Gross RN (offgoing nurse). Report included the following information SBAR 
  
Shift worked:  [id number] Shift summary and any significant changes:  
  NONE 
  
   
Concerns for physician to address:  None Zone phone for oncoming shift:    
  
Patient Information Epi Bautista 63 y.o. 
2/28/2021  5:20 PM by Rigoberto Morris MD. Epi Bautista was admitted from Home 
  
Problem List 
     
Patient Active Problem List  
  Diagnosis Date Noted  S/P atrioventricular rolando ablation 11/17/2020  
    Priority: 1 - One  Gastroesophageal reflux disease without esophagitis 12/29/2020  Acute respiratory failure ([address] 12/20/2020  UTI (urinary tract infection) 12/20/2020  Pneumonia 12/20/2020  
 SOB (shortness of breath) 12/20/2020  CKD (chronic kidney disease) 12/20/2020  Anticoagulated 12/20/2020  PARAG (acute kidney injury) ([address] 12/20/2020  Fluid overload 12/20/2020  Subtherapeutic international normalized ratio (INR) 12/20/2020  Suspected COVID-19 virus infection 12/20/2020  Chest pain 11/23/2020  Left below-knee amputee ([address] 06/11/2020  H/O bilateral mastectomy 03/26/2020  Foot deformity, right 09/24/2019  Pes cavus 09/24/2019  Diabetic polyneuropathy associated with type 2 diabetes mellitus ([address] 11/13/2018  Morbid obesity with BMI of 40.0-44.9, adult ([address] 05/01/2017  Controlled type 2 diabetes mellitus with stage 4 chronic kidney disease, with long-term current use of insulin ([address] 01/16/2017  PAF (paroxysmal atrial fibrillation) ([address] 11/28/2016  Anemia in stage 4 chronic kidney disease ([address] 11/28/2016  Essential hypertension 08/26/2016  Systolic murmur 05/86/0084  CKD (chronic kidney disease), stage IV ([address] 06/09/2012  Mixed hyperlipidemia 05/22/2012  Long term (current) use of anticoagulants 04/11/2012  S/P cardiac pacemaker procedure 04/03/2012  Sick sinus syndrome ([address] 04/02/2012  Status post ablation of atrial fibrillation 12/15/2011  Fe deficiency anemia 04/14/2010  
 History of breast cancer 04/13/2010  Asthma 04/13/2010  
  
Past Medical History:  
Diagnosis Date  Acquired lymphedema    
  Right arm  Acute respiratory failure ([address] 12/19/2020  Arrhythmia    
 Asthma    
 Atrial fibrillation [facility])    
  Dr. Ludwin Can and Dr. Leah Etienne Atrial flutter [facility])    
 Cancer [facility])    
  bilateral breast  
 Chronic kidney disease    
 Diabetes mellitus type II    
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    
 Essential hypertension    
 Hyperlipidemia    
 Hypertension    
 Long term (current) use of anticoagulants    
 Morbid obesity ([address] 3/14/2012  Nausea & vomiting    
 Neuropathy    
 Osteoarthritis    
 Pacemaker    
 Sick sinus syndrome (HCC)    
 Type 2 diabetes mellitus with left diabetic foot infection ([address] 10/29/2019  
  
  
Core Measures: CHF:Yes 
  
Activity: 
Activity Level: Bed Rest 
  
Cardiac:  
Cardiac Monitoring: No     
  
Access:  
Current line(s): PIV  
  
Genitourinary:  
Urinary status: external catheter  
  
Respiratory:  
O2 Device: Room air Chronic home O2 use?: N/A Incentive spirometer at bedside: N/A 
  
GI: 
Last Bowel Movement Date: 03/06/21 Current diet:  DIET CARDIAC Regular; Consistent Carb 2000kcal; FR 1200ML Passing flatus: YES Tolerating current diet: YES 
% Diet Eaten: 50 % 
  
Pain Management:  
Patient states pain is manageable on current regimen: YES 
  
Skin: 
Valdemar Score: 16 Interventions: increase time out of bed, limit briefs and internal/external urinary devices Patient Safety: 
Fall Score: Total Score: 2 Interventions: bed/chair alarm, gripper socks and pt to call before getting OOB High Fall Risk: Yes 
  
DVT prophylaxis: DVT prophylaxis Med- Yes DVT prophylaxis SCD or LINA- No  
  
Wounds: (If Applicable) Wounds- Yes Location: stage 1 PI sacrum 
  
Active Consults: 
IP CONSULT TO CARDIOLOGY 
IP CONSULT TO HOSPITALIST 
  
Length of Stay: 
Expected LOS: 4d 0h 
Actual LOS: 10D Discharge Plan: Yes; SNF

## 2021-03-10 NOTE — PROGRESS NOTES
Transition of Care Plan: 
  
Disposition: SNF ([facility]) 3/11/21 *Report: [phone] *Room: 112 Follow up appointments: PCP & speciality apts DME needed: Defer to rehab Transportation at [address] transport secured for anticipated d/c tomorrow (3/11/21) at 11:30 AM; PCS completed, copy on chart Keys or means to access home:  Pt transitioning to SNF at d/c IM Medicare letter: Commercial insurance; 2nd IM not needed Caregiver Contact: Pt's spouse Myles Parisi: [phone]) Discharge Caregiver contacted prior to discharge?  At d/c 
COVID-19 test per placement protocol: Pt had COVID-19 test completed per placement protocol on 3/8/21; results negative Update - 3:13 PM: [facility] transport secured for anticipated d/c tomorrow (3/11/21) at 11:30 AM; PCS completed, copy on chart. CM will relay update to pt & medical team. 
 
Initial note: CM reviewed pt's chart and consulted with attending MD in IDR prior to moving forward with d/c planning. MD reported that pt is medically stable for d/c pending the completion of the psych consult. Per chart, psych N.P, B. Angelic Colindres attempted to f/u yesterday evening (3/9/21), however was unable to get through with the number provided in the consult. CM received update that [facility] accepted pt for short-term rehab at d/c. CM relayed update to pt; pt confirmed she would like to move forward with [facility] at d/. Pt also reported that she will need medical transport secured for d/c. CM spoke with [facility]'s admission liaison Alexandre Mariano who initiated insurance auth this morning (3/10/21); auth successfully processed. Diane Meng confirmed that [address] [facility] can accept pt tomorrow (3/11/21) pending medical stability. CM will continue to follow & report updates as they become available. Jordan Carlson, [facility] Care Manager, [id number] [facility] 
[phone]

## 2021-03-10 NOTE — PROGRESS NOTES
Problem: Falls - Risk of 
Goal: *Absence of Falls Description: Document Yared Moody Fall Risk and appropriate interventions in the flowsheet. Outcome: Progressing Towards Goal 
Note: Fall Risk Interventions: 
Mobility Interventions: Bed/chair exit alarm Mentation Interventions: Bed/chair exit alarm Medication Interventions: Bed/chair exit alarm Elimination Interventions: Bed/chair exit alarm, Call light in reach, Patient to call for help with toileting needs Problem: Patient Education: Go to Patient Education Activity Goal: Patient/Family Education Outcome: Progressing Towards Goal 
  
Problem: Risk for Spread of Infection Goal: Prevent transmission of infectious organism to others Description: Prevent the transmission of infectious organisms to other patients, staff members, and visitors. Outcome: Progressing Towards Goal 
  
Problem: Patient Education:  Go to Education Activity Goal: Patient/Family Education Outcome: Progressing Towards Goal 
  
Problem: Pressure Injury - Risk of 
Goal: *Prevention of pressure injury Description: Document Valdemar Scale and appropriate interventions in the flowsheet. Outcome: Progressing Towards Goal 
Note: Pressure Injury Interventions: 
Sensory Interventions: Assess changes in LOC, Float heels, Keep linens dry and wrinkle-free, Turn and reposition approx. every two hours (pillows and wedges if needed) Moisture Interventions: Apply protective barrier, creams and emollients, Internal/External urinary devices Activity Interventions: PT/OT evaluation, Increase time out of bed Mobility Interventions: PT/OT evaluation, Turn and reposition approx. every two hours(pillow and wedges) Nutrition Interventions: Document food/fluid/supplement intake, Offer support with meals,snacks and hydration Friction and Shear Interventions: Apply protective barrier, creams and emollients Problem: Patient Education: Go to Patient Education Activity Goal: Patient/Family Education Outcome: Progressing Towards Goal 
  
Problem: Breathing Pattern - Ineffective Goal: *Absence of hypoxia Outcome: Progressing Towards Goal 
Goal: *Use of effective breathing techniques Outcome: Progressing Towards Goal 
  
Problem: Patient Education: Go to Patient Education Activity Goal: Patient/Family Education Outcome: Progressing Towards Goal 
  
Problem: Patient Education: Go to Patient Education Activity Goal: Patient/Family Education Outcome: Progressing Towards Goal 
  
Problem: Diabetes Self-Management Goal: *Disease process and treatment process Description: Define diabetes and identify own type of diabetes; list 3 options for treating diabetes. Outcome: Progressing Towards Goal 
Goal: *Incorporating nutritional management into lifestyle Description: Describe effect of type, amount and timing of food on blood glucose; list 3 methods for planning meals. Outcome: Progressing Towards Goal 
Goal: *Incorporating physical activity into lifestyle Description: State effect of exercise on blood glucose levels. Outcome: Progressing Towards Goal 
Goal: *Developing strategies to promote health/change behavior Description: Define the ABC's of diabetes; identify appropriate screenings, schedule and personal plan for screenings. Outcome: Progressing Towards Goal 
Goal: *Using medications safely Description: State effect of diabetes medications on diabetes; name diabetes medication taking, action and side effects. Outcome: Progressing Towards Goal 
Goal: *Monitoring blood glucose, interpreting and using results Description: Identify recommended blood glucose targets  and personal targets. Outcome: Progressing Towards Goal 
Goal: *Prevention, detection, treatment of acute complications Description: List symptoms of hyper- and hypoglycemia; describe how to treat low blood sugar and actions for lowering  high blood glucose level.  
Outcome: Progressing Towards Goal 
Goal: *Prevention, detection and treatment of chronic complications Description: Define the natural course of diabetes and describe the relationship of blood glucose levels to long term complications of diabetes. Outcome: Progressing Towards Goal 
Goal: *Developing strategies to address psychosocial issues Description: Describe feelings about living with diabetes; identify support needed and support network Outcome: Progressing Towards Goal 
Goal: *Insulin pump training Outcome: Progressing Towards Goal 
Goal: *Sick day guidelines Outcome: Progressing Towards Goal 
Goal: *Patient Specific Goal (EDIT GOAL, INSERT TEXT) Outcome: Progressing Towards Goal 
  
Problem: Patient Education: Go to Patient Education Activity Goal: Patient/Family Education Outcome: Progressing Towards Goal

## 2021-03-10 NOTE — PROGRESS NOTES
Physical Therapy Note Patient requesting pain medication prior to working with PT. Spoke with RN. Will return for PT tx. Thank you, 
Brent BLANCOT

## 2021-03-10 NOTE — PROGRESS NOTES
Problem: Self Care Deficits Care Plan (Adult) Goal: *Acute Goals and Plan of Care (Insert Text) Description: FUNCTIONAL STATUS PRIOR TO ADMISSION: Patient was modified independent using a rolling walker for functional mobility. Was bathing seated on tub transfer bench and perform all ADLs without assist.  Cooks. When prothesis does not fit pt slide board transfers from wheelchair on her own. 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 Occupational Therapy Goals: 
7 day Re-eval 3/10/2021 all goals remain appropriate Initiated 3/3/2021 1. Patient will perform grooming seated without back support and independence within 7 days. 2. Patient will perform lower body dressing to include prosthesis with supervision within 7 days. 3. Patient will perform toileting with supervision within 7 days. 4. Patient will transfer from bedside commode with supervision using the least restrictive device and appropriate durable medical equipment within 7 days. 3/10/2021 1210 by RICK Terrell/L Outcome: Progressing Towards Goal 
 
OCCUPATIONAL THERAPY RE-EVALUATION Patient: Austyn Starkey (89 y.o. female) Date: 3/10/2021 Diagnosis: Acute respiratory failure ([address] [J96.00] Acute respiratory failure ([address] Precautions: Fall, Contact(L BKA, ESBL) Chart, occupational therapy assessment, plan of care, and goals were reviewed. ASSESSMENT Based on the objective data described below, pt continues to make gains with mobility and ADLS. Pt is now able to get her prosthesis completely donned and has been able to stand on both sessions that she has been able to get prosthesis on. Pt reports pain in left anterior residual limb with weight bearing in standing and pt was only able to take two steps with RW due to weakness and pain.   CGA to min assist needed for standing balance. CGA overall for dynamic balance seated EOB to attempt donning right shoe/sock and prosthesis. Pt requires significant assist with the majrioty of ADLS at this time. Continue to recommend SNF for rehab at discharge. Current Level of Function Impacting Discharge (ADLs): moderate/max assist bed mobility, min assist x2 sit to stand from elevated bed surface Feeding: Independent Oral Facial Hygiene/Grooming: Setup(seated) Bathing: Moderate assistance Upper Body Dressing: Setup Lower Body Dressing: Maximum assistance Toileting: Maximum assistance Other factors to consider for discharge: was ambulatory prior to onset of illness PLAN : 
Recommendations and Planned Interventions: self care training, functional mobility training, therapeutic exercise, balance training, therapeutic activities, patient education, home safety training, and family training/education Frequency/Duration: Patient will be followed by occupational therapy 5 times a week to address goals. Recommend with staff: karsten with lift team to bedside chair Recommend next OT session: standing, LB ADLS, standing balance Recommendation for discharge: (in order for the patient to meet his/her long term goals) Therapy up to 5 days/week in SNF setting This discharge recommendation: 
Has been made in collaboration with the attending provider and/or case management Equipment recommendations for successful discharge (if) home: none SUBJECTIVE:  
Patient stated It hurts to much to take another step.  OBJECTIVE DATA SUMMARY:  
Hospital course since last seen and reason for reevaluation: now able to get prosthesis on, starting to be able to stand and attempt taking steps Cognitive/Behavioral Status: 
Neurologic State: Alert Orientation Level: Oriented X4(periodic confusion ) Cognition: Follows commands Perception: Appears intact Perseveration: No perseveration noted Safety/Judgement: Fall prevention Hearing: Auditory Auditory Impairment: None Vision/Perceptual:   
Tracking: Able to track stimulus in all quadrants w/o difficulty Acuity: Within Defined Limits Range of Motion: 
 
AROM: Generally decreased, functional 
  
  
  
  
  
  
  
 
Strength: 
 
Strength: Generally decreased, functional 
  
  
  
  
 
Coordination: 
  
Fine Motor Skills-Upper: Left Intact; Right Intact Gross Motor Skills-Upper: Left Intact; Right Intact Tone & Sensation: 
 
  
Sensation: Impaired(no sensation in the bottom of right foot) Functional Mobility and Transfers for ADLs: 
Bed Mobility: 
Rolling: Moderate assistance Supine to Sit: Moderate assistance Scooting: Maximum assistance Transfers: 
Sit to Stand: Minimum assistance; Additional time;Assist x2(elevated bed surface) Functional Transfers Bathroom Mobility: (unable) Toilet Transfer : (unable at this time) Bed to Chair: (unable today, pain in LLE, able to take 2 steps) Balance: 
Sitting - Static: Good (unsupported) Sitting - Dynamic: Fair (occasional) Standing: Impaired Standing - Static: Constant support; Fair 
Standing - Dynamic : Fair ADL Assessment: 
Feeding: Independent Oral Facial Hygiene/Grooming: Setup(seated) Bathing: Moderate assistance Upper Body Dressing: Setup Lower Body Dressing: Maximum assistance Toileting: Maximum assistance ADL Intervention and task modifications: 
  
Max/total assist donning left prosthesis and right shoe Cognitive Retraining Safety/Judgement: Fall prevention Functional Measure: 
Barthel Index: 
 
Bathin Bladder: 5 Bowels: 10 
Groomin Dressin Feeding: 10 Mobility: 0 Stairs: 0 Toilet Use: 0 Transfer (Bed to Chair and Back): 5 Total: 40/100 The Barthel ADL Index: Guidelines 1. The index should be used as a record of what a patient does, not as a record of what a patient could do.  
2. The main aim is to establish degree of independence from any help, physical or verbal, however minor and for whatever reason. 3. The need for supervision renders the patient not independent. 4. A patient's performance should be established using the best available evidence. Asking the patient, friends/relatives and nurses are the usual sources, but direct observation and common sense are also important. However direct testing is not needed. 5. Usually the patient's performance over the preceding 24-48 hours is important, but occasionally longer periods will be relevant. 6. Middle categories imply that the patient supplies over 50 per cent of the effort. 7. Use of aids to be independent is allowed. Garrett Mitchell., Barthel, DLeanneW. (0716). Functional evaluation: the Barthel Index. [facility] (14)2. Oly Pepe kaykay LOUIE MoultonF, Renny Cadet., Jason Light., [place], [address] [place] [address] Ave (1999). Measuring the change indisability after inpatient rehabilitation; comparison of the responsiveness of the Barthel Index and Functional [place] Measure. Journal of Neurology, Neurosurgery, and Psychiatry, 66(4), 534-476. Sarah Han, N.[place].A, BLAISE Rosa, & Beto Longoria, MLeanneA. (2004.) Assessment of post-stroke quality of life in cost-effectiveness studies: The usefulness of the Barthel Index and the EuroQoL-5D. [facility], 59, 600-03 Pain: Moderate in left residual limb with weight bearing through prosthesis (pt was given pain meds prior to session) Activity Tolerance:  
Fair and requires rest breaks After treatment patient left in no apparent distress:  
Supine in bed, Call bell within reach, and Bed / chair alarm activated COMMUNICATION/COLLABORATION:  
The patients plan of care was discussed with: Physical therapist, Registered nurse, and patient . RICK Barraza/L Time Calculation: 24 mins

## 2021-03-10 NOTE — PROGRESS NOTES
Hospitalist Progress Note NAME: Maryse Meza :  1960 MRN:  [id number] This is a 65-year-old female with past medical is a diastolic congestive heart failure presents with shortness of breath and weight gain and was diuresed. She is feeling much better and is close to her baseline weight. She is cleared for discharge and waiting on inpatient rehab placement. Assessment / Plan: 
Acute on chronic diastolic congestive heart failure poa  
-Last echo shows ejection fraction of 55% 
- admission weight was 298 pounds and weight now at 250 pounds. Per cards her baseline weight is close to 245 pounds. 
-Continue Bumex 2  mg IV twice daily. Stop metolazone. Continue Coreg. Check BMP in a.m. 
-Cardiology is following PAF/ History of SSS s/p ablation and leadless PM insertion HTN  
-Continue Coreg 
-warfarin discontinued by cardiology and started on Eliquis. Continue added Eliquis. 
-Cardiology is following CKD 4 
-Baseline creatinine is around 1.9-2.0. Creatinine is 2.1 on monitor daily 
-Currently creatinine is close to baseline. Diabetes mellitus: Continue sliding scale insulin and diabetic diet. Recent A1c was 6.6. Anxiety: cont home meds Left BK 2020 
-Has prosthesis in place. Disposition: 
Medically stable for discharge, awaiting psych evaluation for the hospital delirium. Likely discharge tomorrow. Body mass index is 42.83 kg/m².: 40 or above Morbid obesity Code Status: Full code Surrogate Decision Maker:  DVT Prophylaxis: Eliquis Baseline: independent  lives with her , ambulates with prosthesis Recommended Disposition: SNF Subjective: Chief Complaint / Reason for Physician Visit: FU HF Seen today, has no complaints. Patient alert and oriented x4, no fever no acute events overnight Objective: VITALS:  
Last 24hrs VS reviewed since prior progress note. Most recent are: 
Patient Vitals for the past 24 hrs: Temp Pulse Resp BP SpO2  
03/10/21 1220 98.3 °F (36.8 °C) 91 18 129/66 95 % 03/10/21 0746 97.8 °F (36.6 °C) 92 18 133/70 94 % 03/10/21 0300 98.1 °F (36.7 °C) 90 16 (!) 145/73 94 % 03/09/21 2320 97.8 °F (36.6 °C) 91 16 136/77 97 % 03/09/21 2035 97.7 °F (36.5 °C) 90 18 (!) 140/77 96 % Intake/Output Summary (Last 24 hours) at 3/10/2021 1644 Last data filed at 3/10/2021 3277 Gross per 24 hour Intake 236 ml Output 1825 ml Net -1589 ml I had a face to face encounter and independently examined this patient on 3/10/2021, as outlined below: PHYSICAL EXAM: 
General: WD, WN. Alert, cooperative, no acute distress, mild to moderate dyspnea at rest    
EENT:  EOMI. Anicteric sclerae. MMM Resp:  Bilateral air entry present, crackles present, no wheezing CV:  Regular  rhythm,  No edema GI:  Soft, Non distended, Non tender. +Bowel sounds Neurologic:  Alert and oriented X 3, normal speech, Psych:   Not anxious nor agitated Skin:  No rashes. No jaundice Reviewed most current lab test results and cultures  YES Reviewed most current radiology test results   YES Review and summation of old records today    NO Reviewed patient's current orders and MAR    YES 
PMH/ reviewed - no change compared to H&P 
________________________________________________________________________ Care Plan discussed with: 
  Comments Patient y Family RN y   
Care Manager y Consultant     
                 y Multidiciplinary team rounds were held today with , nursing, pharmacist and clinical coordinator. Patient's plan of care was discussed; medications were reviewed and discharge planning was addressed. ________________________________________________________________________ Total NON critical care TIME:  35  Minutes Total CRITICAL CARE TIME Spent:   Minutes non procedure based Comments >50% of visit spent in counseling and coordination of care y ________________________________________________________________________ Romina Rogers MD  
 
Procedures: see electronic medical records for all procedures/Xrays and details which were not copied into this note but were reviewed prior to creation of Plan. LABS: 
I reviewed today's most current labs and imaging studies. Pertinent labs include: 
Recent Labs 03/09/21 
0199 WBC 5.4 HGB 11.4* HCT 39.5  Recent Labs 03/09/21 
6719 03/08/21 
0086  139  
K 3.3* 3.5  104 CO2 31 28 * 168* BUN 30* 30* CREA 2.23* 2.21* CA 8.8 8.6 Signed: Romina Rogers MD

## 2021-03-10 NOTE — PROGRESS NOTES
End of Shift Note 
  Bedside shift change report given to Laurie RN (oncoming nurse) by Nissa Cevallos RN (offgoing nurse). Report included the following information SBAR 
  
Shift worked:  [id number] Shift summary and any significant changes:  
  NONE 
  
   
Concerns for physician to address:  None Zone phone for oncoming shift:  [phone]  
  
Patient Information Graham Lund 63 y.o. 
2/28/2021  5:20 PM by Vicki Paz MD. Graham Lund was admitted from Home 
  
Problem List 
     
Patient Active Problem List  
  Diagnosis Date Noted  S/P atrioventricular rolando ablation 11/17/2020  
    Priority: 1 - One  Gastroesophageal reflux disease without esophagitis 12/29/2020  Acute respiratory failure ([address] 12/20/2020  UTI (urinary tract infection) 12/20/2020  Pneumonia 12/20/2020  
 SOB (shortness of breath) 12/20/2020  CKD (chronic kidney disease) 12/20/2020  Anticoagulated 12/20/2020  PARAG (acute kidney injury) ([address] 12/20/2020  Fluid overload 12/20/2020  Subtherapeutic international normalized ratio (INR) 12/20/2020  Suspected COVID-19 virus infection 12/20/2020  Chest pain 11/23/2020  Left below-knee amputee ([address] 06/11/2020  H/O bilateral mastectomy 03/26/2020  Foot deformity, right 09/24/2019  Pes cavus 09/24/2019  Diabetic polyneuropathy associated with type 2 diabetes mellitus ([address] 11/13/2018  Morbid obesity with BMI of 40.0-44.9, adult ([address] 05/01/2017  Controlled type 2 diabetes mellitus with stage 4 chronic kidney disease, with long-term current use of insulin ([address] 01/16/2017  PAF (paroxysmal atrial fibrillation) ([address] 11/28/2016  Anemia in stage 4 chronic kidney disease ([address] 11/28/2016  Essential hypertension 08/26/2016  Systolic murmur 40/84/7475  CKD (chronic kidney disease), stage IV ([address] 06/09/2012  Mixed hyperlipidemia 05/22/2012  Long term (current) use of anticoagulants 04/11/2012  S/P cardiac pacemaker procedure 04/03/2012  Sick sinus syndrome ([facility] 04/02/2012  Status post ablation of atrial fibrillation 12/15/2011  Fe deficiency anemia 04/14/2010  
 History of breast cancer 04/13/2010  Asthma 04/13/2010  
  
Past Medical History:  
Diagnosis Date  Acquired lymphedema    
  Right arm  Acute respiratory failure ([facility] 12/19/2020  Arrhythmia    
 Asthma    
 Atrial fibrillation [facility])    
  Dr. Cecilio Oliva and Dr. Roddy Olivia Atrial flutter [facility])    
 Cancer [facility])    
  bilateral breast  
 Chronic kidney disease    
 Diabetes mellitus type II    
 Diabetic ulcer of left heel associated with type 2 diabetes mellitus, with fat layer exposed ([facility] 6/21/2019  Diabetic ulcer of left midfoot with necrosis of muscle ([facility] 3/3/2020  Dizziness    
 Essential hypertension    
 Hyperlipidemia    
 Hypertension    
 Long term (current) use of anticoagulants    
 Morbid obesity ([facility] 3/14/2012  Nausea & vomiting    
 Neuropathy    
 Osteoarthritis    
 Pacemaker    
 Sick sinus syndrome (HCC)    
 Type 2 diabetes mellitus with left diabetic foot infection ([facility] 10/29/2019  
  
  
Core Measures: CHF:Yes 
  
Activity: 
Activity Level: Bed Rest 
  
Cardiac:  
Cardiac Monitoring: No     
  
Access:  
Current line(s): PIV  
  
Genitourinary:  
Urinary status: external catheter  
  
Respiratory:  
O2 Device: Room air Chronic home O2 use?: N/A Incentive spirometer at bedside: N/A 
  
GI: 
Last Bowel Movement Date: 03/06/21 Current diet:  DIET CARDIAC Regular; Consistent Carb 2000kcal; FR 1200ML Passing flatus: YES Tolerating current diet: YES 
% Diet Eaten: 50 % 
  
Pain Management:  
Patient states pain is manageable on current regimen: YES 
  
Skin: 
Valdemar Score: 16 Interventions: increase time out of bed, limit briefs and internal/external urinary devices Patient Safety: 
Fall Score: Total Score: 2 Interventions: bed/chair alarm, gripper socks and pt to call before getting OOB High Fall Risk: Yes 
  
DVT prophylaxis: DVT prophylaxis Med- Yes DVT prophylaxis SCD or LINA- No  
  
Wounds: (If Applicable) Wounds- Yes Location: stage 1 PI sacrum 
  
Active Consults: 
IP CONSULT TO CARDIOLOGY 
IP CONSULT TO HOSPITALIST 
  
Length of Stay: 
Expected LOS: 4d 0h 
Actual LOS: 10D Discharge Plan: Yes; SNF

## 2021-03-10 NOTE — PROGRESS NOTES
Problem: Mobility Impaired (Adult and Pediatric) Goal: *Acute Goals and Plan of Care (Insert Text) Description: FUNCTIONAL STATUS PRIOR TO ADMISSION: Patient was modified independent using a rolling walker for functional mobility. PATRICIA CHILEL in 5/2020 and wears prosthesis. Uses wheelchair for long distances. If prosthesis doesn't fit, pt uses slide board for transfers HOME SUPPORT PRIOR TO ADMISSION: The patient lived with spouse and required some assistance (bed mobility, transfers at times). Independent ADLs Physical Therapy Goals Initiated 3/3/2021 1. Patient will move from supine to sit and sit to supine  in bed with minimal assistance/contact guard assist within 7 day(s). 2.  Patient will transfer from bed to chair and chair to bed with minimal assistance/contact guard assist using the least restrictive device within 7 day(s). 3.  Patient will perform sit to stand with minimal assistance/contact guard assist within 7 day(s). 4.  Patient will ambulate with minimal assistance/contact guard assist for 50 feet with the least restrictive device within 7 day(s). Outcome: Progressing Towards Goal 
 PHYSICAL THERAPY TREATMENT Patient: Hank Wheeler (89 y.o. female) Date: 3/10/2021 Diagnosis: Acute respiratory failure ([facility] [J96.00] Acute respiratory failure ([facility] [address] Precautions: Fall, Contact(L BKA, ESBL) Chart, physical therapy assessment, plan of care and goals were reviewed. ASSESSMENT Patient continues with skilled PT services and is progressing towards goals. She continues to demonstrate decreased endurance, and strength. Patient transitions supine to sit with HOB elevated to 90* and some physical assistance to shift her hips. She requires a short seated rest break before donning her prosthetic. Bed height is elevated significantly and patient performs sit<>stand with CGA.  She demonstrates increased anxiety in standing requiring increased time to transition hand from bed rail to RW. She makes several attempts to weight shift to step forward but reports increased pain in the residual limb and is unable to weight shift to take greater than one step. She is returned supine in bed and provided assistance with repositioning. Current Level of Function Impacting Discharge (mobility/balance): Min A with HOB elevated, good sitting balance, Min A sit<>stand unable to weight shift Other factors to consider for discharge: medical stability, increased need for assistance, increased risk for falls PLAN : 
Patient continues to benefit from skilled intervention to address the above impairments. Continue treatment per established plan of care. to address goals. Recommendation for discharge: (in order for the patient to meet his/her long term goals) Therapy up to 5 days/week in SNF setting This discharge recommendation: 
Has been made in collaboration with the attending provider and/or case management IF patient discharges home will need the following DME: to be determined (TBD) SUBJECTIVE:  
Patient stated i'm so tired.  OBJECTIVE DATA SUMMARY:  
Critical Behavior: 
Neurologic State: Alert Orientation Level: Oriented X4(periodic confusion ) Cognition: Follows commands Safety/Judgement: Fall prevention Functional Mobility Training: 
Bed Mobility: 
Rolling: Moderate assistance Supine to Sit: Moderate assistance Scooting: Maximum assistance Transfers: 
Sit to Stand: Minimum assistance; Additional time;Assist x2(elevated bed surface) Stand to Sit: Minimum assistance; Additional time;Assist x2 Bed to Chair: (unable today, pain in LLE, able to take 2 steps) Balance: 
Sitting - Static: Good (unsupported) Sitting - Dynamic: Fair (occasional) Standing: Impaired Standing - Static: Constant support; Fair 
Standing - Dynamic : Fair Ambulation/Gait Training: 
  
  
  
  
  
  
  
  
  
  
  
  
  
  
  
  
  
  
Stairs: Therapeutic Exercises:  
 
Pain Rating: 
 
 
Activity Tolerance:  
Fair After treatment patient left in no apparent distress:  
Supine in bed, Call bell within reach, and Side rails x 3 
 
COMMUNICATION/COLLABORATION:  
The patients plan of care was discussed with: Occupational therapist and Registered nurse. Cathy Alvares, PT Time Calculation: 21 mins

## 2021-03-11 NOTE — PROGRESS NOTES
Transition of Care Plan: 
  
Disposition: SNF ([address] [place] [address],[address]) *Report: [phone] *Room: 112 Follow up appointments: PCP & speciality apts DME needed: Defer to rehab Transportation at [address] transport secured for 11:30 AM; PCS completed, copy on chart Keys or means to access home:  Pt transitioning to SNF at d/c IM Medicare letter: Commercial insurance; 2nd IM not needed Caregiver Contact: Pt's spouse Ivanna Dumont: [phone]) Discharge Caregiver contacted prior to discharge?  At d/c 
COVID-19 test per placement protocol: Pt had COVID-19 test completed per placement protocol on 3/8/21; results negative Initial note: CM acknowledged d/c. CM reviewed pt's chart and consulted with attending MD prior to moving forward with d/c planning. MD reported that the psych consult was completed this morning; MD confirmed that pt is medically stable for d/c today to SNF [address] [place] [address],[address] [place] [address]). CM requested for MD to review d/c medications & provide hard scripts for any controlled substances; MD verbalized understanding. CM secured [facility] transport for d/c today at 11:30 AM; PCS completed, copy on chart. CM contacted [address] [place] [address],[address] [place] [address] ([phone]) to check in and review plan for d/c. CM spoke with [address] [place] [address] admission liaison Maria Isabel Chaudhry) who confirmed that the facility is anticipating pt's arrival mid-day between 11:30 AM-12:00 PM. CM met with pt at bedside to check in and review d/c plan re: SNF ([address] [place] [address],[address] [place] [address]). Upon conducting room visit, pt presented as A&O x4. CM confirmed that the pt is agreeable to the d/c plan; no immediate questions or concerns identified. Pt aware of [facility] transport at 11:30 AM. CM attempted to contact pt's  Ivanna Dumont: [phone]) x2 this morning to review d/c plan; both attempts unsuccessful, phone ringing straight to voicemail. CM unable to leave voicemail; mailbox full.  CM inquired if pt wanted CM to contact anyone else besides her  to review the d/c plan; pt declined. CM will continue attempting to get in contact with pt's . No further CM needs identified. CM notified pt's nurse of d/c. Care Management Interventions PCP Verified by CM: Yes 
Palliative Care Criteria Met (RRAT>21 & CHF Dx)?: No 
Mode of Transport at Discharge: [facility] Transport Time of Discharge: 36 Transition of Care Consult (CM Consult): SNF Partner SNF: Yes Discharge Durable Medical Equipment: No 
Physical Therapy Consult: Yes Occupational Therapy Consult: Yes Speech Therapy Consult: No 
Current Support Network: Lives with Spouse, Own Home(Pt lives with her spouse in 1 story home with 1 KATHI) Confirm Follow Up Transport: Family The Plan for Transition of Care is Related to the Following Treatment Goals : SNF The Patient and/or Patient Representative was Provided with a Choice of Provider and Agrees with the Discharge Plan?: Yes Name of the Patient Representative Who was Provided with a Choice of Provider and Agrees with the Discharge Plan: patient Joelle Kurtz) Freedom of Choice List was Provided with Basic Dialogue that Supports the Patient's Individualized Plan of Care/Goals, Treatment Preferences and Shares the Quality Data Associated with the Providers?: Yes [employer] Information Provided?: No 
Discharge Location Discharge Placement: Skilled nursing facility([facility]) [facility] Care Manager, [facility] 
[phone]

## 2021-03-11 NOTE — PROGRESS NOTES
End of Shift Note 
  Bedside shift change report given to Farheen Roberto (oncoming nurse) by Tasha Howard RN (offgoing nurse). Report included the following information SBAR 
  
Shift worked:  [address] Shift summary and any significant changes:  
  Given Oxy for abd pain.  
  
   
Concerns for physician to address:  None Zone phone for oncoming shift:  [phone]  
  
Patient Information Austyn Starkey 63 y.o. 
2/28/2021  5:20 PM by Charlotte Choi MD. Austyn Starkey was admitted from Home 
  
Problem List 
     
Patient Active Problem List  
  Diagnosis Date Noted  S/P atrioventricular rolando ablation 11/17/2020  
    Priority: 1 - One  Gastroesophageal reflux disease without esophagitis 12/29/2020  Acute respiratory failure ([address] 12/20/2020  UTI (urinary tract infection) 12/20/2020  Pneumonia 12/20/2020  
 SOB (shortness of breath) 12/20/2020  CKD (chronic kidney disease) 12/20/2020  Anticoagulated 12/20/2020  PARAG (acute kidney injury) ([address] 12/20/2020  Fluid overload 12/20/2020  Subtherapeutic international normalized ratio (INR) 12/20/2020  Suspected COVID-19 virus infection 12/20/2020  Chest pain 11/23/2020  Left below-knee amputee ([address] 06/11/2020  H/O bilateral mastectomy 03/26/2020  Foot deformity, right 09/24/2019  Pes cavus 09/24/2019  Diabetic polyneuropathy associated with type 2 diabetes mellitus ([address] 11/13/2018  Morbid obesity with BMI of 40.0-44.9, adult ([address] 05/01/2017  Controlled type 2 diabetes mellitus with stage 4 chronic kidney disease, with long-term current use of insulin ([address] 01/16/2017  PAF (paroxysmal atrial fibrillation) ([address] 11/28/2016  Anemia in stage 4 chronic kidney disease ([address] 11/28/2016  Essential hypertension 08/26/2016  Systolic murmur 41/11/9428  CKD (chronic kidney disease), stage IV ([address] 06/09/2012  Mixed hyperlipidemia 05/22/2012  Long term (current) use of anticoagulants 04/11/2012  S/P cardiac pacemaker procedure 04/03/2012  Sick sinus syndrome ([facility] [address] 04/02/2012  Status post ablation of atrial fibrillation 12/15/2011  Fe deficiency anemia 04/14/2010  
 History of breast cancer 04/13/2010  Asthma 04/13/2010  
  
Past Medical History:  
Diagnosis Date  Acquired lymphedema    
  Right arm  Acute respiratory failure ([address] 12/19/2020  Arrhythmia    
 Asthma    
 Atrial fibrillation [facility])    
  Dr. Ruiz Buck and Dr. Stephanie Sandy Atrial flutter [facility])    
 Cancer [facility])    
  bilateral breast  
 Chronic kidney disease    
 Diabetes mellitus type II    
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    
 Essential hypertension    
 Hyperlipidemia    
 Hypertension    
 Long term (current) use of anticoagulants    
 Morbid obesity ([address] 3/14/2012  Nausea & vomiting    
 Neuropathy    
 Osteoarthritis    
 Pacemaker    
 Sick sinus syndrome (HCC)    
 Type 2 diabetes mellitus with left diabetic foot infection ([facility] [address] 10/29/2019  
  
  
Core Measures: CHF:Yes 
  
Activity: 
Activity Level: Bed Rest 
  
Cardiac:  
Cardiac Monitoring: No     
  
Access:  
Current line(s): PIV  
  
Genitourinary:  
Urinary status: external catheter  
  
Respiratory:  
O2 Device: Room air Chronic home O2 use?: N/A Incentive spirometer at bedside: N/A 
  
GI: 
Last Bowel Movement Date: 03/06/21 Current diet:  DIET CARDIAC Regular; Consistent Carb 2000kcal; FR 1200ML Passing flatus: YES Tolerating current diet: YES 
% Diet Eaten: 50 % 
  
Pain Management:  
Patient states pain is manageable on current regimen: YES 
  
Skin: 
Valdemar Score: 17 Interventions: increase time out of bed, limit briefs and internal/external urinary devices Patient Safety: 
Fall Score: Total Score: 2 Interventions: bed/chair alarm, gripper socks and pt to call before getting OOB High Fall Risk: Yes 
  
DVT prophylaxis: DVT prophylaxis Med- Yes DVT prophylaxis SCD or LINA- No  
  
Wounds: (If Applicable) Wounds- Yes Location: stage 1 PI sacrum 
  
Active Consults: 
IP CONSULT TO CARDIOLOGY 
IP CONSULT TO HOSPITALIST 
  
Length of Stay: 
Expected LOS: 4d 0h 
Actual LOS: 10D Discharge Plan: Yes; SNF

## 2021-03-11 NOTE — PROGRESS NOTES
Problem: Falls - Risk of 
Goal: *Absence of Falls Description: Document Any Salguero Fall Risk and appropriate interventions in the flowsheet. Outcome: Progressing Towards Goal 
Note: Fall Risk Interventions: 
Mobility Interventions: Bed/chair exit alarm Mentation Interventions: Adequate sleep, hydration, pain control Medication Interventions: Bed/chair exit alarm Elimination Interventions: Call light in reach Problem: Patient Education: Go to Patient Education Activity Goal: Patient/Family Education Outcome: Progressing Towards Goal 
  
Problem: Risk for Spread of Infection Goal: Prevent transmission of infectious organism to others Description: Prevent the transmission of infectious organisms to other patients, staff members, and visitors. Outcome: Progressing Towards Goal 
  
Problem: Patient Education:  Go to Education Activity Goal: Patient/Family Education Outcome: Progressing Towards Goal 
  
Problem: Pressure Injury - Risk of 
Goal: *Prevention of pressure injury Description: Document Valdemar Scale and appropriate interventions in the flowsheet. Outcome: Progressing Towards Goal 
Note: Pressure Injury Interventions: 
Sensory Interventions: Assess changes in LOC, Float heels, Keep linens dry and wrinkle-free, Turn and reposition approx. every two hours (pillows and wedges if needed) Moisture Interventions: Apply protective barrier, creams and emollients Activity Interventions: Increase time out of bed, Pressure redistribution bed/mattress(bed type) Mobility Interventions: Turn and reposition approx. every two hours(pillow and wedges) Nutrition Interventions: Document food/fluid/supplement intake Friction and Shear Interventions: Apply protective barrier, creams and emollients Problem: Patient Education: Go to Patient Education Activity Goal: Patient/Family Education Outcome: Progressing Towards Goal 
  
Problem: Breathing Pattern - Ineffective Goal: *Absence of hypoxia Outcome: Progressing Towards Goal 
Goal: *Use of effective breathing techniques Outcome: Progressing Towards Goal 
  
Problem: Patient Education: Go to Patient Education Activity Goal: Patient/Family Education Outcome: Progressing Towards Goal 
  
Problem: Patient Education: Go to Patient Education Activity Goal: Patient/Family Education Outcome: Progressing Towards Goal 
  
Problem: Diabetes Self-Management Goal: *Disease process and treatment process Description: Define diabetes and identify own type of diabetes; list 3 options for treating diabetes. Outcome: Progressing Towards Goal 
Goal: *Incorporating nutritional management into lifestyle Description: Describe effect of type, amount and timing of food on blood glucose; list 3 methods for planning meals. Outcome: Progressing Towards Goal 
Goal: *Incorporating physical activity into lifestyle Description: State effect of exercise on blood glucose levels. Outcome: Progressing Towards Goal 
Goal: *Developing strategies to promote health/change behavior Description: Define the ABC's of diabetes; identify appropriate screenings, schedule and personal plan for screenings. Outcome: Progressing Towards Goal 
Goal: *Using medications safely Description: State effect of diabetes medications on diabetes; name diabetes medication taking, action and side effects. Outcome: Progressing Towards Goal 
Goal: *Monitoring blood glucose, interpreting and using results Description: Identify recommended blood glucose targets  and personal targets. Outcome: Progressing Towards Goal 
Goal: *Prevention, detection, treatment of acute complications Description: List symptoms of hyper- and hypoglycemia; describe how to treat low blood sugar and actions for lowering  high blood glucose level. Outcome: Progressing Towards Goal 
Goal: *Prevention, detection and treatment of chronic complications Description: Define the natural course of diabetes and describe the relationship of blood glucose levels to long term complications of diabetes. Outcome: Progressing Towards Goal 
Goal: *Developing strategies to address psychosocial issues Description: Describe feelings about living with diabetes; identify support needed and support network Outcome: Progressing Towards Goal 
Goal: *Insulin pump training Outcome: Progressing Towards Goal 
Goal: *Sick day guidelines Outcome: Progressing Towards Goal 
Goal: *Patient Specific Goal (EDIT GOAL, INSERT TEXT) Outcome: Progressing Towards Goal 
  
Problem: Patient Education: Go to Patient Education Activity Goal: Patient/Family Education Outcome: Progressing Towards Goal

## 2021-03-11 NOTE — PROGRESS NOTES
Problem: Falls - Risk of 
Goal: *Absence of Falls Description: Document Yared Moody Fall Risk and appropriate interventions in the flowsheet. Outcome: Resolved/Met Note: Fall Risk Interventions: 
Mobility Interventions: Bed/chair exit alarm, Communicate number of staff needed for ambulation/transfer, Patient to call before getting OOB Mentation Interventions: Adequate sleep, hydration, pain control, Bed/chair exit alarm, Toileting rounds Medication Interventions: Bed/chair exit alarm, Patient to call before getting OOB Elimination Interventions: Bed/chair exit alarm, Call light in reach Problem: Patient Education: Go to Patient Education Activity Goal: Patient/Family Education Outcome: Resolved/Met Problem: Risk for Spread of Infection Goal: Prevent transmission of infectious organism to others Description: Prevent the transmission of infectious organisms to other patients, staff members, and visitors. Outcome: Resolved/Met Problem: Patient Education:  Go to Education Activity Goal: Patient/Family Education Outcome: Resolved/Met Problem: Pressure Injury - Risk of 
Goal: *Prevention of pressure injury Description: Document Valdemar Scale and appropriate interventions in the flowsheet. Outcome: Resolved/Met Note: Pressure Injury Interventions: 
Sensory Interventions: Assess need for specialty bed, Check visual cues for pain, Float heels, Keep linens dry and wrinkle-free, Turn and reposition approx. every two hours (pillows and wedges if needed) Moisture Interventions: Minimize layers Activity Interventions: Increase time out of bed Mobility Interventions: HOB 30 degrees or less Nutrition Interventions: Document food/fluid/supplement intake Friction and Shear Interventions: Minimize layers Problem: Patient Education: Go to Patient Education Activity Goal: Patient/Family Education Outcome: Resolved/Met Problem: Breathing Pattern - Ineffective Goal: *Absence of hypoxia Outcome: Resolved/Met Goal: *Use of effective breathing techniques Outcome: Resolved/Met Problem: Patient Education: Go to Patient Education Activity Goal: Patient/Family Education Outcome: Resolved/Met Problem: Patient Education: Go to Patient Education Activity Goal: Patient/Family Education Outcome: Resolved/Met Problem: Diabetes Self-Management Goal: *Disease process and treatment process Description: Define diabetes and identify own type of diabetes; list 3 options for treating diabetes. Outcome: Resolved/Met Goal: *Incorporating nutritional management into lifestyle Description: Describe effect of type, amount and timing of food on blood glucose; list 3 methods for planning meals. Outcome: Resolved/Met Goal: *Incorporating physical activity into lifestyle Description: State effect of exercise on blood glucose levels. Outcome: Resolved/Met Goal: *Developing strategies to promote health/change behavior Description: Define the ABC's of diabetes; identify appropriate screenings, schedule and personal plan for screenings. Outcome: Resolved/Met Goal: *Using medications safely Description: State effect of diabetes medications on diabetes; name diabetes medication taking, action and side effects. Outcome: Resolved/Met Goal: *Monitoring blood glucose, interpreting and using results Description: Identify recommended blood glucose targets  and personal targets. Outcome: Resolved/Met Goal: *Prevention, detection, treatment of acute complications Description: List symptoms of hyper- and hypoglycemia; describe how to treat low blood sugar and actions for lowering  high blood glucose level. Outcome: Resolved/Met Goal: *Prevention, detection and treatment of chronic complications Description: Define the natural course of diabetes and describe the relationship of blood glucose levels to long term complications of diabetes. Outcome: Resolved/Met Goal: *Developing strategies to address psychosocial issues 
Description: Describe feelings about living with diabetes; identify support needed and support network 
Outcome: Resolved/Met 
Goal: *Insulin pump training 
Outcome: Resolved/Met 
Goal: *Sick day guidelines 
Outcome: Resolved/Met 
Goal: *Patient Specific Goal (EDIT GOAL, INSERT TEXT) 
Outcome: Resolved/Met 
  
Problem: Patient Education: Go to Patient Education Activity 
Goal: Patient/Family Education 
Outcome: Resolved/Met

## 2021-03-11 NOTE — PROGRESS NOTES
0700 Bedside shift change report given to Crystal Maldonado (oncoming nurse) by Richelle Sagastume RN (offgoing nurse). Report included the following information SBAR, Kardex, Intake/Output and MAR. [address] [facility] [place] to give report on pt. Gave report to Shane at [facility]. Report included Kardex, SBAR, Medications, Intake and Output

## 2021-03-11 NOTE — DISCHARGE SUMMARY
Hospitalist Discharge Summary Patient ID: 
Mayra Queen [id number] 
11 y.o. 
1960 2/28/2021 PCP on record: Nannette Loomis MD 
 
Admit date: 2/28/2021 Discharge date and time: 3/11/2021 DISCHARGE DIAGNOSIS: 
 
Acute on chronic diastolic congestive heart failure poa PAF History of SSS s/p ablation and leadless PM insertion HTN  
CKD 4 Diabetes mellitus Anxiety: cont home meds Left BKA 5/2020 
   
 
CONSULTATIONS: 
IP CONSULT TO CARDIOLOGY 
IP CONSULT TO HOSPITALIST 
IP CONSULT TO PSYCHIATRY Excerpted HPI from H&P of Rhonda Villar MD: 
Tomi Bloom is a 63 y.o.  female who presents with worsening shortness of breath lower extremity swelling. Patient has left BKA. Patient shortness of breath got worse over the last 2 to 3 days. Her doctors has been adjusting the diuretic dose but the shortness of breath and the swelling in the leg has gotten worse. She gets to the point where she cannot breathe and she came to the hospital.  Otherwise patient denied any chest pain or fever or chills. Patient denied any cough productive of sputum. She does feel shortness of breath especially when she moves. And give similar history of congestive heart failure in the recent past. 
 
______________________________________________________________________ DISCHARGE SUMMARY/HOSPITAL COURSE:  
 
Acute on chronic diastolic congestive heart failure poa  
-Last echo shows ejection fraction of 55% 
- admission weight was 298 pounds and weight now at 250 pounds. Per cards her baseline weight is close to 245 pounds. 
-Continue Bumex 2  mg IV twice daily. Stop metolazone. Continue Coreg. Check BMP in a.m. 
-Cardiology is following 
  
PAF/ History of SSS s/p ablation and leadless PM insertion HTN  
-Continue Coreg 
-warfarin discontinued by cardiology and started on Eliquis.   Continue added Eliquis. 
-Cardiology was consulted during the hospitalization and appreciate input. 
  
CKD 4 
-Baseline creatinine is around 1.9-2.0. Creatinine is 2.1 on monitor daily 
-Currently creatinine is close to baseline. 
  
Diabetes mellitus: Continue sliding scale insulin and diabetic diet. Recent A1c was 6.6. 
  
Anxiety: cont home meds  
  
Left BKA 5/2020 
-Has prosthesis in place. Delirium Appreciate psychiatry inputSeroquel 25 mg twice daily. Outpatient follow-up with psychiatry. _______________________________________________________________________ Patient seen and examined by me on discharge day. Pertinent Findings: 
Gen:    Not in distress Chest: Clear lungs CVS:   Regular rhythm. No edema Abd:  Soft, not distended, not tender Neuro:  Alert,  
_______________________________________________________________________ DISCHARGE MEDICATIONS:  
Discharge Medication List as of 3/11/2021 10:31 AM  
  
START taking these medications Details  
apixaban (ELIQUIS) 5 mg tablet Take 1 Tab by mouth every twelve (12) hours for 30 days. , Normal, Disp-60 Tab, R-0  
  
carvediloL (COREG) 12.5 mg tablet Take 1 Tab by mouth two (2) times daily (with meals) for 30 days. , Normal, Disp-60 Tab, R-0  
  
bumetanide (BUMEX) 1 mg tablet Take 1 Tab by mouth two (2) times a day for 30 days. , Normal, Disp-60 Tab, R-0  
  
  
CONTINUE these medications which have NOT CHANGED Details  
cholecalciferol (Vitamin D3) (1000 Units /25 mcg) tablet Take 1,000 Units by mouth daily. , Historical Med  
  
vitamin a-vitamin c-vit e-min (OCUVITE) tablet Take 1 Tab by mouth daily. , Historical Med  
  
cyanocobalamin (Vitamin B-12) 500 mcg tablet Take 500 mcg by mouth daily. , Historical Med  
  
pantoprazole (PROTONIX) 40 mg tablet Take 1 Tab by mouth Before breakfast and dinner., Print, Disp-60 Tab, R-1  
  
!! sertraline (ZOLOFT) 100 mg tablet Take 100 mg by mouth daily. , Historical Med  
  
!! sertraline (ZOLOFT) 25 mg tablet Take 25 mg by mouth daily.  Take with 100 mg tablet every morning, Historical Med  
  
busPIRone (BUSPAR) 10 mg tablet TAKE ONE TABLET BY MOUTH TWICE A DAY, Normal, Disp-60 Tab, R-10  
  
 !! - Potential duplicate medications found. Please discuss with provider. STOP taking these medications  
  
 warfarin (Coumadin) 1 mg tablet Comments:  
Reason for Stopping:   
   
 hydrALAZINE (APRESOLINE) 100 mg tablet Comments:  
Reason for Stopping:   
   
  
 
 
 
Patient Follow Up Instructions: Activity: Activity as tolerated Diet: Cardiac Diet Wound Care: As directed Follow-up Information Follow up With Specialties Details Why Contact Info Calixto Schneider MD Internal Medicine On 3/12/2021 For hospital follow up appointment at 10:30AM  [address] [place] [address] [phone] Adair Victoria MD Cardiology, [address] [facility] Vascular Surgery, Internal Medicine Schedule an appointment as soon as possible for a visit in 1 week  [address] [place] [address] 
[phone] [address] [place] [address],[address] 
[phone]  
  
 
________________________________________________________________ Risk of deterioration: Moderate Condition at Discharge:  Stable 
__________________________________________________________________ Disposition [facility] 
 
____________________________________________________________________ Code Status: Full Code 
___________________________________________________________________ Total time in minutes spent coordinating this discharge (includes going over instructions, follow-up, prescriptions, and preparing report for sign off to her PCP) :  >30 minutes Signed: 
Roro Trejo MD

## 2021-03-11 NOTE — DISCHARGE INSTRUCTIONS
Patient Discharge Instructions    Tarah Hdez / [id number] : 1960    Admitted 2021 Discharged: 3/8/2021         DISCHARGE DIAGNOSIS:   Acute on chronic diastolic congestive heart failure poa   PAF  History of SSS s/p ablation and leadless PM insertion  HTN   CKD 4  Diabetes mellitus  Anxiety: cont home meds   Left BKA 2020          Take Home Medications     {Medication reconciliation information is now added to the patient's AVS automatically when it is printed. There is no need to use this SmartLink in discharge instructions. Highlight this text and delete it to clear this message}      General drug facts      If you have a very bad allergy, wear an allergy ID at all times.  It is important that you take the medication exactly as they are prescribed.  Keep your medication in the bottles provided by the pharmacist.  Bernadette Cee a list of all your drugs (prescription, natural products, vitamins, OTC) with you. Give this list to your doctor.  Do not take other medications without consulting your doctor.   Do not share your drugs with others and do not take anyone else's drugs.  Keep all drugs out of the reach of children and pets.   Most drugs may be thrown away in household trash after mixing with coffee grounds or linda litter and sealing in a plastic bag.   Keep a list Call your doctor for help with any side effects. If in the [place] you may also call the FDA at 8-367-JPO-5016     Talk with the doctor before starting any new drug, including OTC, natural products, or vitamins. What to do at Home    1. Recommended diet: Diabetic     2. Recommended activity: Activity as tolerated    3. If you experience any of the following symptoms then please call your primary care physician or return to the emergency room if you cannot get hold of your doctor:    4. Wound Care:    5. Lab work:    6. Stop smocking    7. Continue to wear oxygen and use BIPAP as previously     8. Bring these papers with you to your follow up appointments. The papers will help your doctors be sure to continue the care plan from the hospital.      If you have questions regarding the hospital related prescriptions or hospital related issues please call [facility] at [phone]. You can always direct your questions to your primary care doctor if you are unable to reach your hospital physician; your PCP works as an extension of your hospital doctor just like your hospital doctor is an extension of your PCP for your time at the hospital [place], [facility])      Follow-up with:   PCP: Briseyda Conklin MD  Follow-up Information     Follow up With Specialties Details Why Contact Info    Briseyda Conklin MD Internal Medicine   [address]  [phone]      Leigh Canales MD Cardiology, [address] [place] [address] Vascular Surgery, Internal Medicine Schedule an appointment as soon as possible for a visit in 1 week  [address] [place] [address]  [address]              Please call for your own appointment        Information obtained by :  I understand that if any problems occur once I am at home I am to contact my physician. I understand and acknowledge receipt of the instructions indicated above.                                                                                                                                            Physician's or R.N.'s Signature                                                                  Date/Time                                                                                                                                              Patient or Representative Signature                                                          Date/Time      Patient Discharge Instructions    Abbi Bueno / [id number] : 1960    Admitted 2021 Discharged: 3/8/2021         DISCHARGE DIAGNOSIS:   Principal Problem:    Acute respiratory failure ([facility] [address] (12/20/2020)    Active Problems:    S/P atrioventricular rolando ablation (11/17/2020)      S/P cardiac pacemaker procedure (4/3/2012)      Overview: 4/2/12 Medtronic dual chamber pacemaker implant      Pacer removed April 2020 due vegetations related to diabetic foot       osteomyelitis. Mixed hyperlipidemia (5/22/2012)      CKD (chronic kidney disease), stage IV ([facility] [address] (6/9/2012)      Overview: Acute on chronic, Dr. Maddi Santana hypertension (8/26/2016)      Morbid obesity with BMI of 40.0-44.9, adult ([facility] [address] (5/1/2017)           Take Home Medications     {Medication reconciliation information is now added to the patient's AVS automatically when it is printed. There is no need to use this SmartLink in discharge instructions. Highlight this text and delete it to clear this message}      General drug facts      If you have a very bad allergy, wear an allergy ID at all times.  It is important that you take the medication exactly as they are prescribed.  Keep your medication in the bottles provided by the pharmacist.  Lashawn Cantu a list of all your drugs (prescription, natural products, vitamins, OTC) with you. Give this list to your doctor.  Do not take other medications without consulting your doctor.   Do not share your drugs with others and do not take anyone else's drugs.  Keep all drugs out of the reach of children and pets.   Most drugs may be thrown away in household trash after mixing with coffee grounds or linda litter and sealing in a plastic bag.   Keep a list Call your doctor for help with any side effects. If in the [place] you may also call the FDA at 3-978-EMW-3442     Talk with the doctor before starting any new drug, including OTC, natural products, or vitamins. What to do at Home    1. Recommended diet:    2. Recommended activity: {discharge activity:70753}    3.  If you experience any of the following symptoms then please call your primary care physician or return to the emergency room if you cannot get hold of your doctor:    4. Wound Care: None     5. Lab work: Cbc and Bmp in 1 week     6. Check your weight and blood pressure daily and take it to PCP     7. Bring these papers with you to your follow up appointments. The papers will help your doctors be sure to continue the care plan from the hospital.      If you have questions regarding the hospital related prescriptions or hospital related issues please call [facility] at [phone]. You can always direct your questions to your primary care doctor if you are unable to reach your hospital physician; your PCP works as an extension of your hospital doctor just like your hospital doctor is an extension of your PCP for your time at the hospital [facility], [facility])      Follow-up with:   PCP: Natalia Whitney MD  Follow-up Information     Follow up With Specialties Details Why Contact Info    Natalia Whitney MD Internal Medicine   [address] [place] [address]  [phone]      Brennon Bland MD Cardiology, [address] [facility] Vascular Surgery, Internal Medicine Schedule an appointment as soon as possible for a visit in 1 week  [address] [facility]  [address]              Please call for your own appointment        Information obtained by :  I understand that if any problems occur once I am at home I am to contact my physician. I understand and acknowledge receipt of the instructions indicated above.                                                                                                                                            Physician's or R.N.'s Signature                                                                  Date/Time                                                                                                                                              Patient or Representative Signature Date/Time

## 2021-03-11 NOTE — CONSULTS
PSYCHIATRIC CONSULTATION NOTE VIA TELEHEALTH: 
 
REASON FOR CONSULT: 
 
A psychiatric consultation was requested by Abran Daugherty MD to evaluate or provide advice/opinion related to evaluating delirium. HISTORY OF PRESENTING COMPLAINT:  
 
Ms. Bennie Larios is a 63 y.o. WHITE female who is currently admitted to the medical floor at [facility] on 2/28/2021 for the treatment of Acute respiratory failure ([facility] [address] Patient reportedly has delirium. Patient has been accusing nursing staff of things and also saying there are other people running up and down the halls calling them convicts. On assessment, patient is pleasant, she denies any psychiatric history, hospitalization, or medication. Patient however does have Zoloft and Buspirone ordered. Patient at the end of assessment reports that there is something going on in hospital such as selling rooms and she heard the transaction and other events. REVIEW OF SYMPTOMS: 
Patient denies appetite change, weight change, vision change, sleep change, fever, night sweats, headache, cough, shortness of breath, chest pain, palpitations, nausea,vomiting, diarrhea, dizziness, weakness, tremors. PAST MEDICAL HISTORY: 
Please see History & Physical for details. Past Medical History:  
Diagnosis Date  Acquired lymphedema Right arm  Acute respiratory failure ([address] 12/19/2020  Arrhythmia  Asthma  Atrial fibrillation ([address] Dr. Meghan Khan and Dr. Magdalene Matos [facility])  Cancer ([address] bilateral breast  
 Chronic kidney disease  Diabetes mellitus type II   
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 
Allergies Allergen Reactions  Avapro [Irbesartan] Unable to Obtain  Bactrim [Sulfamethoxazole-Trimethoprim] Other (comments) Sore mouth  Contrast Agent [Iodine] Itching [address]  Morphine Nausea and Vomiting and Swelling  Penicillins Hives Did not tolerate cefepime 3/2020  Pravachol [Pravastatin] Nausea Only  Seafood [Shellfish Containing Products] Hives  Singulair [Montelukast] Other (comments)  
  palpitations  Ace Inhibitors Cough  Amlodipine Swelling  Captopril Cough  Carvedilol Diarrhea MEDICATIONS PRIOR TO ADMISSION: 
Medications Prior to Admission Medication Sig  cholecalciferol (Vitamin D3) (1000 Units /25 mcg) tablet Take 1,000 Units by mouth daily.  vitamin a-vitamin c-vit e-min (OCUVITE) tablet Take 1 Tab by mouth daily.  cyanocobalamin (Vitamin B-12) 500 mcg tablet Take 500 mcg by mouth daily.  warfarin (Coumadin) 1 mg tablet Take 2 mg by mouth daily.  pantoprazole (PROTONIX) 40 mg tablet Take 1 Tab by mouth Before breakfast and dinner.  sertraline (ZOLOFT) 100 mg tablet Take 100 mg by mouth daily.  sertraline (ZOLOFT) 25 mg tablet Take 25 mg by mouth daily. Take with 100 mg tablet every morning  hydrALAZINE (APRESOLINE) 100 mg tablet Take 1 Tab by mouth three (3) times daily.  busPIRone (BUSPAR) 10 mg tablet TAKE ONE TABLET BY MOUTH TWICE A DAY CURRENT MEDICATIONS: 
 
Current Facility-Administered Medications:  
  apixaban (ELIQUIS) tablet 5 mg, 5 mg, Oral, Q12H, Jaylin Garcia NP, 5 mg at 03/11/21 2858   pantoprazole (PROTONIX) tablet 40 mg, 40 mg, Oral, ACB&D, Yenni Regalado MD, 40 mg at 03/11/21 0949   sodium chloride (NS) flush 5-40 mL, 5-40 mL, IntraVENous, Q8H, Jennifer Warren Phoenix, MD, 10 mL at 03/11/21 2827   sodium chloride (NS) flush 5-40 mL, 5-40 mL, IntraVENous, PRN, Karene Lundborg, Theodros H, MD, 10 mL at 03/11/21 8814   acetaminophen (TYLENOL) tablet 650 mg, 650 mg, Oral, Q6H PRN, 650 mg at 03/01/21 0525 **OR** acetaminophen (TYLENOL) suppository 650 mg, 650 mg, Rectal, Q6H PRN, Karene Lundborg, Warren Phoenix, MD 
  polyethylene glycol (MIRALAX) packet 17 g, 17 g, Oral, DAILY PRN, Karene Lundborg, Warren Phoenix, MD 
  promethazine (PHENERGAN) tablet 12.5 mg, 12.5 mg, Oral, Q6H PRN **OR** ondansetron (ZOFRAN) injection 4 mg, 4 mg, IntraVENous, Q6H PRN, Karene Lundborg, Theodros H, MD 
  oxyCODONE IR (ROXICODONE) tablet 5 mg, 5 mg, Oral, Q4H PRN, Purveyor, Atoosa, ACNP, 5 mg at 03/11/21 0138 
  oxyCODONE IR (ROXICODONE) tablet 10 mg, 10 mg, Oral, Q4H PRN, Purveyor Atoosa, ACNP, 10 mg at 03/10/21 1035   albuterol-ipratropium (DUO-NEB) 2.5 MG-0.5 MG/3 ML, 3 mL, Nebulization, Q6H PRN, Purradha Atbhanusa, ACNP, 3 mL at 03/02/21 1117   bumetanide (BUMEX) injection 2 mg, 2 mg, IntraVENous, BID, Temi AVITIA NP, 2 mg at 03/11/21 5034   sertraline (ZOLOFT) tablet 125 mg, 125 mg, Oral, DAILY, Jarrett Alexander MD, 125 mg at 03/11/21 0811 
  busPIRone (BUSPAR) tablet 10 mg, 10 mg, Oral, BID, Jarrett Alexander MD, 10 mg at 03/11/21 4160   carvediloL (COREG) tablet 12.5 mg, 12.5 mg, Oral, BID WITH MEALS, Jarrett Alexander MD, 12.5 mg at 03/11/21 3260   hydrALAZINE (APRESOLINE) 20 mg/mL injection 10 mg, 10 mg, IntraVENous, Q2H PRN, Gebremeskel, Warren Phoenix, MD  
 
LAB RESULTS: 
Lab Results Component Value Date/Time WBC 5.6 03/11/2021 04:01 AM  
 Hemoglobin (POC) 13.3 09/09/2011 08:34 AM  
 HGB 11.6 03/11/2021 04:01 AM  
 Hematocrit (POC) 39 09/09/2011 08:34 AM  
 HCT 39.2 03/11/2021 04:01 AM  
 PLATELET 995 52/54/6307 04:01 AM  
 MCV 85.4 03/11/2021 04:01 AM  
  
Lab Results Component Value Date/Time  Sodium 139 03/09/2021 03:36 AM  
 Potassium 3.3 (L) 03/09/2021 03:36 AM  
 Chloride 102 2021 03:36 AM  
 CO2 31 2021 03:36 AM  
 Anion gap 6 2021 03:36 AM  
 Glucose 155 (H) 2021 03:36 AM  
 BUN 30 (H) 2021 03:36 AM  
 Creatinine 2.23 (H) 2021 03:36 AM  
 BUN/Creatinine ratio 13 2021 03:36 AM  
 GFR est AA 27 (L) 2021 03:36 AM  
 GFR est non-AA 22 (L) 2021 03:36 AM  
 Calcium 8.8 2021 03:36 AM  
 Bilirubin, total 1.6 (H) 2021 04:19 AM  
 Alk. phosphatase 88 2021 04:19 AM  
 Protein, total 6.6 2021 04:19 AM  
 Albumin 3.0 (L) 2021 04:19 AM  
 Globulin 3.6 2021 04:19 AM  
 A-G Ratio 0.8 (L) 2021 04:19 AM  
 ALT (SGPT) 11 (L) 2021 04:19 AM  
  
 
PAST PSYCHIATRIC HISTORY: 
Patient denies SUBSTANCE ABUSE HISTORY: 
Social History Substance and Sexual Activity Drug Use No  
  
Drug Screen Most Recent Result Date No resulted procedures found. PSYCHOSOCIAL HISTORY: 
Patient reports she lives with her  and cats. Patient reports she is retired from [employer] Patient reports she has no children MENTAL STATUS EXAM: 
General appearance:  Appropriate Eye contact: Good Speech: Normal 
Affect: Congruent Mood: im ok, im not crazy\" Orientation: Alert and Oriented x 4 Thought Process: Goal Directed, Logical  
Perception: Denies AVH/Paranoia. Patient  appears to have some hallucinations Thought Content: Denies SI/HI Insight: Good Judgement: Good Cognition: Intact grossly Impulse Control: Good ASSESSMENT AND PLAN/RECOMMENDATION: 
Leni Flores meets criteria for a diagnosis of Delirium . At this time I recommend/plan the followin. At this time the patient will not benefit from inpatient psychiatric treatment. 2. EKG, once performed if QTC <475 will start Seroquel 25 mg BID 3. Follow Up with Psychiatry Please consult Psychiatry again for any concerns regarding the patient's mental health changes and/or management.   
 
Thank you for the opportunity to participate in the care of your patient.

## 2021-03-12 NOTE — PROGRESS NOTES
Transition of care outreach postponed for 14 days due to patient's discharge to SNF. Per EMR patient discharged to [facility].

## 2021-03-26 NOTE — PROGRESS NOTES
Outgoing calls patient placed to [address] regarding update of patient status. Spoke with [facility] staff member patient identified utilizing 2 identifiers. Introduced self and reason for the call . [facility] reports patient is currently admitted to the facility. Will continue to monitor patient progress.

## 2021-04-07 PROBLEM — I50.9 CHF (CONGESTIVE HEART FAILURE) (HCC): Status: ACTIVE | Noted: 2021-01-01

## 2021-04-07 PROBLEM — I21.4 NSTEMI (NON-ST ELEVATED MYOCARDIAL INFARCTION) (HCC): Status: ACTIVE | Noted: 2021-01-01

## 2021-04-07 NOTE — Clinical Note
TRANSFER - OUT REPORT:     Verbal report given to: Grace. Report consisted of patient's Situation, Background, Assessment and   Recommendations(SBAR). Opportunity for questions and clarification was provided. Patient transported with a Registered Nurse.

## 2021-04-07 NOTE — CONSULTS
[address] [facility] Date of  Admission: 4/7/2021 12:29 AM  
 
Admission type:Elective Consult for: NSTEMI Consult by: Dr. Earnest Kwan Subjective:  
 
Janusz Lockett is a 63 y.o. female with a PMH significant for chronic PAF, right arm lymphadema (s/p lymphectomy), bilateral mastectomy for breast CA, HTN. HLD, neuropathy, left BKA, SSS, DM II, CKD, Asthma, A-flutter, long term use of OAC, endocarditis  admitted for Chest pain [R07.9] NSTEMI (non-ST elevated myocardial infarction) ([facility] [I21.4] CHF (congestive heart failure) ([facility] [I50.9]. Per attending provider note, the patient presented as a transfer from [facility] for continuation of her care and treatment of her chest pain and heart failure. She is a well-known patient to the practice. The patient initially reported CP 4/10 and some SOB. Upon assessment, the patient states she did have an additional episode of chest pain this am around 7. She states it was mid-sternal, and relieved with a single S/L nitroglycerin. The patient denies any associated nausea, vomiting, diarrhea, diaphoresis. States she has multiple allergies, and is allergic to contrast dye. She denies any fever, chills, or exposure to ill persons. She had a leadless pacemaker placed in November 2020 along with an AV rolando ablation by Dr. Romaine Mccall. By chart review does not appear patient has ever undergone a cardiac catheterization or had a stress test. EKG shhows no acute changes, paced rhythm. ECHO on 3/1/2021 showed:  
· LV: Estimated LVEF is 50 - 55%. Visually measured ejection fraction. Normal cavity size. Upper normal wall thickness. Low normal systolic function. · MV: Mitral annular calcification. Mild to moderate mitral valve regurgitation is present. · TV: Mild to moderate tricuspid valve regurgitation is present. · PA: Pulmonary arterial systolic pressure is 44 mmHg.  
· Image quality for this study was technically difficult. NEW ECHO PENDING Cardiac risk factors: family history, dyslipidemia, diabetes mellitus, obesity, sedentary life style, hypertension, stress, post-menopausal. 
 
 
Patient Active Problem List  
 Diagnosis Date Noted  S/P atrioventricular orlando ablation 11/17/2020 Priority: 1 - One  
 CHF (congestive heart failure) ([facility] 04/07/2021  
 NSTEMI (non-ST elevated myocardial infarction) ([facility] 04/07/2021  Gastroesophageal reflux disease without esophagitis 12/29/2020  Acute respiratory failure ([address] 12/20/2020  UTI (urinary tract infection) 12/20/2020  Pneumonia 12/20/2020  
 SOB (shortness of breath) 12/20/2020  CKD (chronic kidney disease) 12/20/2020  Anticoagulated 12/20/2020  PARAG (acute kidney injury) ([address] 12/20/2020  Fluid overload 12/20/2020  Subtherapeutic international normalized ratio (INR) 12/20/2020  Suspected COVID-19 virus infection 12/20/2020  Chest pain 11/23/2020  Left below-knee amputee ([facility] 06/11/2020  H/O bilateral mastectomy 03/26/2020  Foot deformity, right 09/24/2019  Pes cavus 09/24/2019  Diabetic polyneuropathy associated with type 2 diabetes mellitus ([address] 11/13/2018  Morbid obesity with BMI of 40.0-44.9, adult ([facility] 05/01/2017  Controlled type 2 diabetes mellitus with stage 4 chronic kidney disease, with long-term current use of insulin ([facility] 01/16/2017  PAF (paroxysmal atrial fibrillation) ([facility] 11/28/2016  Anemia in stage 4 chronic kidney disease ([facility] 11/28/2016  Essential hypertension 08/26/2016  Systolic murmur 59/51/4479  CKD (chronic kidney disease), stage IV ([address] 06/09/2012  Mixed hyperlipidemia 05/22/2012  Long term (current) use of anticoagulants 04/11/2012  S/P cardiac pacemaker procedure 04/03/2012  Sick sinus syndrome ([address] 04/02/2012  Status post ablation of atrial fibrillation 12/15/2011  Fe deficiency anemia 04/14/2010  
 History of breast cancer 04/13/2010  Asthma 04/13/2010 Boogie Cui MD 
Past Medical History:  
Diagnosis Date  Acquired lymphedema Right arm  Acute respiratory failure ([facility] 12/19/2020  Arrhythmia  Asthma  Atrial fibrillation ([facility] Dr. Jose Cruz Acevedo and [facility])  Cancer ([facility] bilateral breast  
 Chronic kidney disease  Diabetes mellitus type II   
 Diabetic ulcer of left heel associated with type 2 diabetes mellitus, with fat layer exposed ([facility] 6/21/2019  Diabetic ulcer of left midfoot with necrosis of muscle ([facility] 3/3/2020  Dizziness  Essential hypertension  Hyperlipidemia  Hypertension  Long term (current) use of anticoagulants  Morbid obesity ([facility] 3/14/2012  Nausea & vomiting  Neuropathy  Osteoarthritis  Pacemaker  Sick sinus syndrome ([facility]  Type 2 diabetes mellitus with left diabetic foot infection [facility] 10/29/2019 Social History Socioeconomic History  Marital status:  Spouse name: Not on file  Number of children: Not on file  Years of education: Not on file  Highest education level: Not on file Tobacco Use  Smoking status: Never Smoker  Smokeless tobacco: Never Used Substance and Sexual Activity  Alcohol use: Not Currently  Drug use: No  
 
Allergies Allergen Reactions  Avapro [Irbesartan] Unable to Obtain  Bactrim [Sulfamethoxazole-Trimethoprim] Other (comments) Sore mouth  Contrast Agent [Iodine] Itching [address]  Morphine Nausea and Vomiting and Swelling  Penicillins Hives Did not tolerate cefepime 3/2020  Pravachol [Pravastatin] Nausea Only  Seafood [Shellfish Containing Products] Hives  Singulair [Montelukast] Other (comments)  
  palpitations  Ace Inhibitors Cough  Amlodipine Swelling  Captopril Cough  Carvedilol Diarrhea Family History Problem Relation Age of Onset  Cancer Mother  Heart Disease Mother  Alcohol abuse Father  Diabetes Brother  Hypertension Brother Current Facility-Administered Medications Medication Dose Route Frequency  carvediloL (COREG) tablet 12.5 mg  12.5 mg Oral BID WITH MEALS  
 bumetanide (BUMEX) tablet 1 mg  1 mg Oral BID  nitroglycerin (NITROSTAT) tablet 0.4 mg  0.4 mg SubLINGual PRN  
 sodium chloride (NS) flush 5-40 mL  5-40 mL IntraVENous Q8H  
 sodium chloride (NS) flush 5-40 mL  5-40 mL IntraVENous PRN  
 acetaminophen (TYLENOL) tablet 650 mg  650 mg Oral Q6H PRN Or  
 acetaminophen (TYLENOL) suppository 650 mg  650 mg Rectal Q6H PRN  polyethylene glycol (MIRALAX) packet 17 g  17 g Oral DAILY PRN  promethazine (PHENERGAN) tablet 12.5 mg  12.5 mg Oral Q6H PRN Or  
 ondansetron (ZOFRAN) injection 4 mg  4 mg IntraVENous Q6H PRN  
 insulin lispro (HUMALOG) injection   SubCUTAneous AC&HS  
 glucose chewable tablet 16 g  4 Tab Oral PRN  
 dextrose (D50W) injection syrg 12.5-25 g  12.5-25 g IntraVENous PRN  
 glucagon (GLUCAGEN) injection 1 mg  1 mg IntraMUSCular PRN  
 aspirin delayed-release tablet 81 mg  81 mg Oral DAILY  heparin 25,000 units in D5W 250 ml infusion  18-36 Units/kg/hr IntraVENous TITRATE  heparin (porcine) injection 2,000 Units  2,000 Units IntraVENous PRN Or  
 heparin (porcine) injection 4,000 Units  4,000 Units IntraVENous PRN Review of Symptoms:  
11 systems reviewed, negative other than as stated in the HPI Objective:  
  
Visit Vitals /72 Pulse 90 Temp 97.4 °F (36.3 °C) Resp 18 Ht 5' 10\" (1.778 m) Wt 111.1 kg (245 lb) SpO2 100% BMI 35.15 kg/m² Physical:  
Gen: chronically ill appearing  female, ashen appearance, in NAD Abdomen: soft, non-tender. Bowel sounds normal, obese Extremities: left leg BKA, right leg trace edema, discolored Heart: regular rate and rhythm, no murmur, S3/JVD Lungs: diminished, crackles bilateral posterior bases Neck: supple, symmetrical, trachea midline Neurologic: alert and oriented x 4, calm Data Review:  
Recent Labs 04/07/21 
0230 WBC 5.3 HGB 11.6 HCT 39.6  Recent Labs 04/07/21 
0230   
K 4.5  
 CO2 21 * BUN 35* CREA 2.81* CA 9.1 ALB 2.8* TBILI 1.4* ALT 38 Recent Labs 04/07/21 
0230 TROIQ 28.50* No intake or output data in the 24 hours ending 04/07/21 1132 Cardiographics Telemetry: Paced ECG: Paced, no acute changes Echocardiogram: 3/1/2021 · LV: Estimated LVEF is 50 - 55%. Visually measured ejection fraction. Normal cavity size. Upper normal wall thickness. Low normal systolic function. · MV: Mitral annular calcification. Mild to moderate mitral valve regurgitation is present. · TV: Mild to moderate tricuspid valve regurgitation is present. · PA: Pulmonary arterial systolic pressure is 44 mmHg. · Image quality for this study was technically difficult. New ECHO Pending CXRAY: \"No focal infiltrate\" Assessment:  
  
 Active Problems: 
  Chest pain (11/23/2020) CHF (congestive heart failure) ([facility] (4/7/2021) NSTEMI (non-ST elevated myocardial infarction) ([facility] (4/7/2021) Plan:  
Janusz Lockett is a 63 y.o. female with a PMH significant for chronic PAF, right arm lymphadema (s/p lymphectomy), bilateral mastectomy for breast CA, HTN. HLD, neuropathy, left BKA, SSS, DM II, CKD, Asthma, A-flutter, long term use of OAC, endocarditis  admitted for Chest pain [R07.9] NSTEMI (non-ST elevated myocardial infarction) ([facility] [I21.4] CHF (congestive heart failure) ([facility] [I50.9]. Cardiology consulted for NSTEMI. NSTEMI:  
Troponin peaked at 55, trended down to 28, no furuther trending necessary. Continue to monitor tele Admitted to hospitalist 
Chest pain resolved at this moment of assessment, had another episode at 7 am relieved with a SL nitro EKG with no acute changes, paced Continue heparin gtt Hold [address] for her PAF because of plans of potential cardiac cath in am. Considered high risk cardiac cath, may need impella support. I discussed the risks/benefits/alternatives of cardiac catheterization +/- PCI with the patient. Risks include (but are not limited to) bleeding, infection, cva/mi/tamponade/death. The patient understands and wishes to proceed. ECHO from [facility] showed EF 10-15%, will repeat ECHO here. Patient reports allergies to contrast, premedicating with prednisone, benadryl, and pepcid. NPO after midnight, ok to resume diet today Acute on chronic systolic HF 
ECHO from [address] [facility] reported at 10-15%, prior normal EF, will recheck ECHO Continue bumex 1 mg PO BID, Coreg 12.5 mg PO BID,ASA, no ACE/ARB/Entresto due to CKD Nephrology consulted Parox A-fib s/p AV node ablation   
Hold [facility] Coreg for rate control Hypertension: controlled Continue diuretic and coreg PARAG on Chronic kidney disease stage IV : baseline 2-2.1 Renal function worse from baseline-Cr 2.23 Avoid nephrotoxic substances Follow BMP Nephrology consulted, diuresing for congestion DM II: 
Manage per internal medicine HLD:  
LDL on 12/30/2020 21. Patient not on statin due to elevated LFT's, monitor. Anxiety:   
Continue Zoloft  
 
Thanks for consulting RCA, will follow. Italo Lerma, NP  
DNP,APRN,AG-ACNP-BC Patient seen and examined by me with the above nurse practitioner. I personally performed all components of the history, physical, and medical decision making and agree with the assessment and plan with minor modifications as noted. Today the patient presents with NSTEMI and new systolic heart failure, but overall well compensated by physical exam. 
 
General PE Gen:  NAD, pale Mental Status - Alert. General Appearance - Not in acute distress. HEENT: 
PERRL, no carotid bruits or JVD Chest and Lung Exam  
Inspection: Accessory muscles - No use of accessory muscles in breathing. Auscultation:  
Breath sounds: - Normal.  
Cardiovascular Inspection: Jugular vein - Bilateral - Inspection Normal.  
Palpation/Percussion:  
Apical Impulse: - Normal.  
Auscultation: Rhythm - Regular. Heart Sounds - S1 WNL and S2 WNL. No S3 or S4. Murmurs & Other Heart Sounds: Auscultation of the heart reveals - No Murmurs. Peripheral Vascular Upper Extremity: Inspection - Bilateral - No Cyanotic nailbeds or Digital clubbing. Lower Extremity:  
Palpation: Edema - Bilateral - No edema. Left leg BKA. Abdomen:   Soft, non-tender, bowel sounds are active. Neuro: A&O times 3, CN and motor grossly WNL Unfortunate 63year-old with multiple severe medical problems, now with non-ST elevation MI and new reduction in LVEF to severely reduced. I have discussed the risks and benefits of cardiac catheterization plus minus PCI with the patient who expresses understanding. I have explained that she is at significantly higher than average risk for all complications including renal failure and dialysis. She is willing to proceed with dialysis if necessary. Unfortunately, risk of progressive heart failure and death is higher likely without catheterization and with catheterization. Consulting renal and discussed with consulting physician.

## 2021-04-07 NOTE — PROGRESS NOTES
0030: Pt arrived to [facility] via EMS stretcher. Pt reporting 4/10 CP radiating to right arm. Pt on 2L o2 for comfort. 98% on RA. Admission assessment complete. Dual skin assessment complete, skin clean, dry, intact. Fall Risk charted, Valdemar Skin Risk charted. Pt arrived on heparin gtt running at 18units/kg/hr. Held at this time. 0122: Spoke with pharmacist. Per pharmacy, will resume the heparin gtt at 18units/kg/hr. There is no recent PTT in paper chart received from EMS via [facility]. Will draw PTT once orders are placed. 0140: MD Rodriguez to see pt and place orders. 0320: Spoke with pharmacist r/t STAT PTT. PTT: 29.0. Per pharmacist, will keep heparin gtt at 18units/kg/hr and re-draw PTT in 6hrs. 7081: Perfect serve message sent to NP Purveyor r/t 7/10 CP. Calling for orders. 0345: CP: 9/10. 1st SL Nitro tablet given. RN at bedside. Pt paced on the monitor. VSS. 2L NC O2 for comfort. 0355: CP: 2/10. Pt reports burning tongue sensation, and does not want 2nd SL Nitro tablet. 0425: Lab called regarding critical trop: 28.5. At [facility] trop peaked at 50.3, and last drawn trop was 29.8.  
 
7:11 AM Pt reporting 4/10 CP. 1 nitro tablet given. Pt reports she is \"feeling better. \" [facility] End of Shift Note Tele: 
Significant tele event? none Time/details of tele event: N/A 
 
I&O/ Daily Weight: 
Strict I&O ordered? NO Fluid restriction ordered: NO 
PO intake since midnight: 40 
 
Daily weight ordered: NO Today's weight: 111.3kg, bed Yesterday's weight: N/A Significant weight gain reported to MD? N/A Procedure: NPO order for upcoming test/procedure present? yes Procedure/test: cath Date of procedure: plan for cath 4/7 Discharge: 
Times ambulated in hallways past shift: 0        Times OOB to chair past shift: 0 Plan/Discharge barriers identified?: Needs to be seen by cardiology, possible cath today Has this patient received a stent this admission?  NO If yes, verify patient has aspirin, antiplatelet, statin, BB, ACE/ARB (for EF < 40%) ordered. N/A If not ordered, which medication is missing? N/A N/A Other concerns: 
Any nurse/patient concerns to be addressed by MD? Needs to be seen by cardiology, possible cath today Bedside shift change report given to Jennifer (oncoming nurse) by Diana Allan (offgoing nurse). Report included the following information SBAR, Procedure Summary and Cardiac Rhythm Paced. Diana Allan

## 2021-04-07 NOTE — Clinical Note
Catheter used: Pigtail (straight). Catheter inserted. Propranolol Pregnancy And Lactation Text: This medication is Pregnancy Category C and it isn't known if it is safe during pregnancy. It is excreted in breast milk.

## 2021-04-07 NOTE — PROGRESS NOTES
Non-billable note History of atrial fibrillation/flutter status post AV rolando ablation s/p leadless PPM (at previous history of extraction of pacemaker and wires that were found to be involved in endocarditis/vegetations) Type 2 diabetes with neuropathy Chronic kidney disease stage IV Acquired lymphedema of the right arm History of bilateral breast cancer Hypertension Hyperlipidemia Morbid obesity Left below-knee amputation May 2020 Most recent echo 3.2021 · LV: Estimated LVEF is 50 - 55%. Visually measured ejection fraction. Normal cavity size. Upper normal wall thickness. Low normal systolic function. · MV: Mitral annular calcification. Mild to moderate mitral valve regurgitation is present. · TV: Mild to moderate tricuspid valve regurgitation is present. · PA: Pulmonary arterial systolic pressure is 44 mmHg Was transferred from [address] [place] [address] on the hospital with complaints of chest pain was old and Chest clear Dry weight per cardiology records is about 240 pounds Continue with aspirin (ordered) and heparin infusion Discontinue apixaban 
 
Continue home dose of diuretics Daily weights Intake and out put charting 
 
continuous O2 sat monitoring Wean off O2 as long as it is more than 92%, avoid hyperoxia Cardiology to follow Nephrology to follow

## 2021-04-07 NOTE — H&P
Hospitalist Admission Note NAME: Nelsy Rodriguez :  1960 MRN:  [id number] Date/Time:  2021 1:13 AM 
 
Patient PCP: Hannah Walton MD 
________________________________________________________________________ My assessment of this patient's clinical condition and my plan of care is as follows. Assessment / Plan: 
Chest pain likely cardiac POA Acute on chronic systolic  CHF POA Troponin peaked at 50 now dropping down to 29 Patient still having chest pain 4 out of 10. Patient is on heparin drip. Cardiology consult Echocardiogram, from [facility] with ejection fraction of 10-15 Continue Lasix 40 daily N.p.o. after midnight possible cardiac cath in the morning follow-up with cardiology Parox A-fib s/p AV node ablation Hypertension Continue home medications PARAG on Chronic kidney disease stage IV Renal function worse from baseline Avoid nephrotoxic substances Follow-up BMP in the morning Nephrology consult if worse , diuresing for congestion DM Start sliding scale Diabetic diet Elevated LFTs could be part of congestive heart failure Normocytic Anemia Obesity HLD Anxiety  Zoloft  
Code Status: Full code Surrogate Decision MakerJudy Ty Spouse [phone] GI Prophylaxis: Protonix Dvt ppx: Patient is on a heparin drip Subjective: CHIEF COMPLAINT: Chest pain HISTORY OF PRESENT ILLNESS:    
Trinidad Sanchez is a 63 y.o.  female who presents with from [facility].  Patient has been taking care of for chest pain and congestive heart failure. Patient transferred from [facility] for possible procedure in the morning. Patient was seen and examined. Patient said she still having chest pain 4 out of 10. And mild shortness of breath. Otherwise patient denied any urinary or bowel habit changes for me. Patient looks tired. But answer all my questions.  
 
We were asked to admit for work up and evaluation of the above problems. Past Medical History:  
Diagnosis Date  Acquired lymphedema Right arm  Acute respiratory failure ([facility] 12/19/2020  Arrhythmia  Asthma  Atrial fibrillation ([facility] Dr. So Miles and Dr. Salma alves [facility])  Cancer ([address] bilateral breast  
 Chronic kidney disease  Diabetes mellitus type II   
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  
Procedure Laterality Date  HX AFIB ABLATION    
 HX AMPUTATION FOOT Left 04/2020  HX BREAST RECONSTRUCTION Bilateral   
 HX CARPAL TUNNEL RELEASE [address] [facility] [address] RIGHT MODIFIED RADICAL   
 HX MASTECTOMY Left   
 no lymphnode removal  
 HX MOHS PROCEDURES  1995  
 right  HX ORTHOPAEDIC Right   
 knee surgery  HX PACEMAKER  11/17/2020 Dr. Rashawn Cortez. Insertion of permanent pacemaker. AV node ablation  HX PACEMAKER    
 IR INSERT TUNL CVC W PORT OVER 5 YEARS    
 IR INSERT TUNL CVC W/O PORT OVER 5 YR  5/4/2020  IR INSERT TUNL CVC W/O PORT OVER 5 YR  9/8/2020  IR REMOVE TUNL CVAD W/O PORT / PUMP  6/26/2020  IR REMOVE TUNL CVAD W/O PORT / PUMP  10/19/2020  MO ABDOMEN SURGERY PROC UNLISTED    
 gastric bypass [address] AND 1994  MO GASTRIC BYPASS,OBESE<150CM SAW-[id number]  7/08  
 [facility]  MO ICAR CATHETER ABLATION ATRIOVENTR NODE FUNCTION N/A 11/17/2020 ABLATION AV NODE performed by Greg Chandler MD at [address] CATH LAB [address]  SD NEEDLE BIOPSY LIVER,W OTHR PROC  8.21.07  
 SD RELOCATION OF SKIN POCKET FOR PACEMAKER N/A 4/24/2020 RELOCATE [address] performed [place] Lady [place]ern [place] at [address] CATH LAB  SD RMVL TRANSVNS PM ELTRD 1 LEAD SYS ATR/VENTR N/A 4/24/2020 Remove Pacemaker Dual Leads performed by Lady Kern MD at [facility] CARDIAC CATH LAB  SD RMVL TRANSVNS PM ELTRD 1 LEAD SYS ATR/VENTR N/A 4/27/2020 REMOVE PACEMAKER DUAL LEADS performed by Lady Kern MD at [address] CATH LAB  SD TCAT INSJ/RPL PERM LEADLESS PACEMAKER RV W/IMG N/A 11/17/2020 INSERT OR REPLACE TRANSCATH PPM LEADLESS performed by Greg Chandler MD at [address] CATH LAB  SD TRABECULOPLASTY BY LASER SURGERY    
 US GUIDE ASP OVARIAN CYST ABD APPROACH  8.21.07  
 RIGHT Social History Tobacco Use  Smoking status: Never Smoker  Smokeless tobacco: Never Used Substance Use Topics  Alcohol use: Not Currently Family History Problem Relation Age of Onset  Cancer Mother  Heart Disease Mother  Alcohol abuse Father  Diabetes Brother  Hypertension Brother Allergies Allergen Reactions  Avapro [Irbesartan] Unable to Obtain  Bactrim [Sulfamethoxazole-Trimethoprim] Other (comments) Sore mouth  Contrast Agent [Iodine] Itching [address]  Morphine Nausea and Vomiting and Swelling  Penicillins Hives Did not tolerate cefepime 3/2020  Pravachol [Pravastatin] Nausea Only  Seafood [Shellfish Containing Products] Hives  Singulair [Montelukast] Other (comments)  
  palpitations  Ace Inhibitors Cough  Amlodipine Swelling  Captopril Cough  Carvedilol Diarrhea Prior to Admission medications Medication Sig Start Date End Date Taking? Authorizing Provider QUEtiapine (SEROqueL) 25 mg tablet Take 1 Tab by mouth two (2) times a day for 30 days. Indications: additional medications to treat depression 3/11/21 4/10/21 Yes Rachel Stanley MD  
apixaban (ELIQUIS) 5 mg tablet Take 1 Tab by mouth every twelve (12) hours for 30 days. 3/8/21 4/7/21 Yes Margarito Bowie MD  
carvediloL (COREG) 12.5 mg tablet Take 1 Tab by mouth two (2) times daily (with meals) for 30 days. 3/8/21 4/7/21 Yes Margarito Bowie MD  
bumetanide (BUMEX) 1 mg tablet Take 1 Tab by mouth two (2) times a day for 30 days. 3/8/21 4/7/21 Yes Margarito Bowie MD  
cholecalciferol (Vitamin D3) (1000 Units /25 mcg) tablet Take 1,000 Units by mouth daily. Yes Provider, Historical  
vitamin a-vitamin c-vit e-min (OCUVITE) tablet Take 1 Tab by mouth daily. Yes Provider, Historical  
cyanocobalamin (Vitamin B-12) 500 mcg tablet Take 500 mcg by mouth daily. Yes Provider, Historical  
pantoprazole (PROTONIX) 40 mg tablet Take 1 Tab by mouth Before breakfast and dinner. 12/7/20  Yes Naomi Vogel MD  
sertraline (ZOLOFT) 100 mg tablet Take 100 mg by mouth daily. 10/30/20  Yes Provider, Historical  
sertraline (ZOLOFT) 25 mg tablet Take 25 mg by mouth daily. Take with 100 mg tablet every morning 10/6/20  Yes Provider, Historical  
busPIRone (BUSPAR) 10 mg tablet TAKE ONE TABLET BY MOUTH TWICE A DAY 5/24/19   Dmitri Ulloa MD  
 
 
REVIEW OF SYSTEMS:    
I am not able to complete the review of systems because: The patient is intubated and sedated The patient has altered mental status due to his acute medical problems The patient has baseline aphasia from prior stroke(s) The patient has baseline dementia and is not reliable historian The patient is in acute medical distress and unable to provide information Total of 12 systems reviewed as follows:   
   POSITIVE= underlined text  Negative = text not underlined General:  fever, chills, sweats, generalized weakness, weight loss/gain,  
   loss of appetite Eyes: blurred vision, eye pain, loss of vision, double vision ENT:    rhinorrhea, pharyngitis Respiratory:   cough, sputum production, SOB, CHOW, wheezing, pleuritic pain  
Cardiology:   chest pain, palpitations, orthopnea, PND, edema, syncope Gastrointestinal:  abdominal pain , N/V, diarrhea, dysphagia, constipation, bleeding Genitourinary:  frequency, urgency, dysuria, hematuria, incontinence Muskuloskeletal :  arthralgia, myalgia, back pain Hematology:  easy bruising, nose or gum bleeding, lymphadenopathy Dermatological: rash, ulceration, pruritis, color change / jaundice Endocrine:   hot flashes or polydipsia Neurological:  headache, dizziness, confusion, focal weakness, paresthesia, Speech difficulties, memory loss, gait difficulty Psychological: Feelings of anxiety, depression, agitation Objective: VITALS:   
Visit Vitals /68 (BP 1 Location: Left arm, BP Patient Position: At rest) Pulse 90 Temp 97.4 °F (36.3 °C) Resp 16 SpO2 100% PHYSICAL EXAM: 
 
General:    Alert, cooperative not in distress but she looks tired obese. HEENT: Atraumatic, anicteric sclerae, pink conjunctivae No oral ulcers, mucosa moist, throat clear, dentition fair Neck:  Supple, symmetrical,  thyroid: non tender Lungs:   Decreased air entry in the lower lung fields bilaterally. Chest wall:  No tenderness  No Accessory muscle use. Heart:   Regular  rhythm,  No  murmur   No edema Abdomen:   Soft, non-tender. Not distended. Bowel sounds normal 
Extremities: No cyanosis. No clubbing,   
  Skin turgor normal, Capillary refill normal, Radial dial pulse 2+ Skin:     Not pale. Not Jaundiced  No rashes Psych:  Good insight. Not depressed. Not anxious or agitated. Neurologic: EOMs intact. No facial asymmetry. No aphasia or slurred speech. Symmetrical strength, Sensation grossly intact. Alert and oriented X 4. Right lower extremity below knee amputation noted _______________________________________________________________________ Care Plan discussed with: 
  Comments Patient y Family RN y   
Care Manager Consultant:     
_______________________________________________________________________ Expected  Disposition:  
Home with Family HH/PT/OT/RN   
SNF/LTC   
[facility]   
________________________________________________________________________ TOTAL TIME: 65 Minutes Critical Care Provided     Minutes non procedure based Comments  
 y Reviewed previous records  
>50% of visit spent in counseling and coordination of care  Discussion with patient and/or family and questions answered 
  
 
________________________________________________________________________ Signed: Sarah Miller MD 
 
Procedures: see electronic medical records for all procedures/Xrays and details which were not copied into this note but were reviewed prior to creation of Plan. LAB DATA REVIEWED:   
No results found for this or any previous visit (from the past 24 hour(s)).

## 2021-04-07 NOTE — Clinical Note
TRANSFER - OUT REPORT:     Verbal report given to: Que Horner. Report consisted of patient's Situation, Background, Assessment and   Recommendations(SBAR). Opportunity for questions and clarification was provided. Patient transported with a Registered Nurse, Monitor and Oxygen. Oxygen used for patient = @ 2 - 6 Liters. Patient transported to: CCU, 2538.

## 2021-04-07 NOTE — Clinical Note
Lesion located in the Mid LAD. Balloon inserted. Balloon inflated using single inflation technique. Lesion #1: Pressure = 14 johanna; Duration = 15 sec.

## 2021-04-07 NOTE — Clinical Note
TRANSFER - OUT REPORT:     Verbal report given to: Venkat Hdez, (at bedside). Report consisted of patient's Situation, Background, Assessment and   Recommendations(SBAR). Opportunity for questions and clarification was provided. Patient transported with a Registered Nurse. Patient transported to: Recovery room.

## 2021-04-07 NOTE — Clinical Note
Stent inserted. Stent deployed. Single technique used. First inflation pressure = 15 johanna; inflation time: 15 sec.

## 2021-04-07 NOTE — Clinical Note
Patient taken of the table and taken to recovery due to a STEMI in the ER, will bring patient back later today.

## 2021-04-08 PROBLEM — Z98.890 S/P CARDIAC CATH: Status: ACTIVE | Noted: 2021-01-01

## 2021-04-08 NOTE — PROGRESS NOTES
[facility] End of Shift Note Tele: 
Significant tele event? none Time/details of tele event: N/A 
 
I&O/ Daily Weight: 
Strict I&O ordered? YES Fluid restriction ordered: NO 
PO intake since midnight: 0 ml/ NPO Daily weight ordered: YES Today's weight: 116.5kg (Zero bed prior to measure)   Yesterday's weight: 111.1kg (unknown source) Significant weight gain reported to MD?  
 
Procedure: NPO order for upcoming test/procedure present? yes Procedure/test: cath Date of procedure: 4/8/2021 Discharge: 
Times ambulated in hallways past shift: 0        Times OOB to chair past shift: 0 Plan/Discharge barriers identified?: Cardiac cath procedure this 7:30 AM 
Has this patient received a stent this admission? N/A If yes, verify patient has aspirin, antiplatelet, statin, BB, ACE/ARB (for EF < 40%) ordered. N/A If not ordered, which medication is missing? N/A N/A Other concerns: 
Any nurse/patient concerns to be addressed by MD? N/A Bedside shift change report given to Vasyl Duvall RN (oncoming nurse) by Katy Schwab (offgoing nurse). Report included the following information SBAR, Kardex, Intake/Output, MAR, Accordion, Recent Results, Med Rec Status, Cardiac Rhythm Paced, Alarm Parameters , Pre Procedure Checklist, Procedure Verification and Quality Measures. Lianne Smith

## 2021-04-08 NOTE — PROGRESS NOTES
. 
 
 
Hospitalist Progress Note NAME: Good Quan :  1960 MRN:  [id number] Assessment / Plan: 
 
Non-ST elevation MI Acute on chronic systolic heart failure Most recent echo 3.2021 · LV: Estimated LVEF is 50 - 55%. Visually measured ejection fraction. Normal cavity size. Upper normal wall thickness. Low normal systolic function. · MV: Mitral annular calcification. Mild to moderate mitral valve regurgitation is present. · TV: Mild to moderate tricuspid valve regurgitation is present. · PA: Pulmonary arterial systolic pressure is 44 mmHg 
  
Echo  
· LV: Estimated LVEF is 20 - 25%. Normal cavity size. Moderate concentric hypertrophy. Severely reduced systolic function. 
  
Before transfer to our hospital troponin had reached 50 
 was in 20s Continued on heparin infusion Continued on diuresis Continue aspirin Daily weights Intake and out put charting 
continuous O2 sat monitoring Wean off O2 as long as it is more than 92%, avoid hyperoxia  LHC and RHC done today with significantly elevated pulmonary pressure and angiographically significant three-vessel native coronary artery disease Plans to shift patient to ICU for swan placement and optimization of heart failure treatment History of atrial fibrillation/flutter status post AV rolando ablation s/p leadless PPM (at previous history of extraction of pacemaker and wires that were found to be involved in endocarditis/vegetations) Type 2 diabetes with neuropathy Chronic kidney disease stage IV Acquired lymphedema of the right arm History of bilateral breast cancer Hypertension Hyperlipidemia Morbid obesity Left below-knee amputation May 2020  
   
  
Continue sliding scale Adding glargine 10 units qhs and premeal 5 units tid Nephrology to follow 30.0 - 39.9 Obese / Body mass index is 36.85 kg/m². Code status: Full Prophylaxis: heparin Recommended Disposition: Home w/Family Subjective: Chief Complaint / Reason for Physician Visit Continuing to follow up Ms. Winn. Was admitted for complaints of dyspnea and chest pain. Found to have NSTEMI, triple vessel disease. Continues to endorse intermittent chest discomfort and dyspnea  Discussed with RN events overnight. Review of Systems: 
Symptom Y/N Comments  Symptom Y/N Comments Fever/Chills n   Chest Pain Poor Appetite    Edema Cough n   Abdominal Pain n   
Sputum n   Joint Pain SOB/CHOW y   Pruritis/Rash Nausea/vomit    Tolerating PT/OT Diarrhea    Tolerating Diet Constipation    Other Could NOT obtain due to:   
 
Objective: VITALS:  
Last 24hrs VS reviewed since prior progress note. Most recent are: 
Patient Vitals for the past 24 hrs: 
 Temp Pulse Resp BP SpO2  
04/08/21 1700  90 19 105/68 97 % 04/08/21 1630 97.5 °F (36.4 °C) 91 17 107/73   
04/08/21 1600 97 °F (36.1 °C) 91 18 111/72 99 % 04/08/21 1500  91  106/64 100 % 04/08/21 1449  90  100/61 100 % 04/08/21 1330  90  93/64 100 % 04/08/21 1325  90  99/68   
04/08/21 1245  90  103/69 100 % 04/08/21 1230  91  112/76 98 % 04/08/21 1215  90  106/76 96 % 04/08/21 1210  90  122/89 97 % 04/08/21 1205  91  115/75 98 % 04/08/21 1200  91  106/73 94 % 04/08/21 1155  91  109/66 96 % 04/08/21 1130  91  101/64 99 % 04/08/21 1100  90  97/60 95 % 04/08/21 1045  90  96/62 95 % 04/08/21 1030  90 18 96/62 97 % 04/08/21 1015  91 16 95/66 96 % 04/08/21 1010  91 17 107/75 97 % 04/08/21 1005  91 17 107/69 96 % 04/08/21 1000  90 16 108/69 94 % 04/08/21 0705     94 % 04/08/21 0700 97.3 °F (36.3 °C) 90 14 101/63 91 % 04/08/21 0600  90  (!) 94/59 91 % 04/08/21 0500  91  103/61 (!) 88 % 04/08/21 0400  91  105/63 95 % 04/08/21 0300 97.2 °F (36.2 °C) 91 16 105/64 93 % 04/08/21 0200  90  110/65 94 % 04/08/21 0100  90  111/63 91 % 04/08/21 0000  90  109/70 95 % 04/07/21 2345  90  112/70 98 % 04/07/21 2330  90  114/67 100 % 04/07/21 2315  91  105/68 100 % 04/07/21 2300 97.2 °F (36.2 °C) 91 14 115/69 99 % 04/07/21 2255  90  113/69 99 % 04/07/21 2250  91  110/67 99 % 04/07/21 2245  91  113/72 98 % 04/07/21 2241  90  118/70   
04/07/21 2240  91  118/70 97 % 04/07/21 1917 97 °F (36.1 °C) 90 16 112/65 99 % Intake/Output Summary (Last 24 hours) at 4/8/2021 1754 Last data filed at 4/8/2021 1600 Gross per 24 hour Intake 1310 ml Output 650 ml Net 660 ml I had a face to face encounter and independently examined this patient on 4/8/2021, as outlined below: PHYSICAL EXAM: 
General: WD, WN. Alert, cooperative, no acute distress EENT:  EOMI. Anicteric sclerae. MMM Resp:  Bilateral subscapular fine crackles. no wheezing or rales. No accessory muscle use CV:  Regular  rhythm,  No edema GI:  Soft, Non distended, Non tender. +Bowel sounds Neurologic:  Alert and oriented X 3, normal speech, Psych:   Good insight. Not anxious nor agitated Skin:  No rashes. No jaundice Reviewed most current lab test results and cultures  YES Reviewed most current radiology test results   YES Review and summation of old records today    NO Reviewed patient's current orders and MAR    YES 
PMH/ reviewed - no change compared to H&P 
________________________________________________________________________ Care Plan discussed with: 
  Comments Patient x Family RN x Care Manager Consultant Multidiciplinary team rounds were held today with , nursing, pharmacist and clinical coordinator. Patient's plan of care was discussed; medications were reviewed and discharge planning was addressed. ________________________________________________________________________ Total NON critical care TIME:  27   Minutes Total CRITICAL CARE TIME Spent:   Minutes non procedure based Comments >50% of visit spent in counseling and coordination of care x   
________________________________________________________________________ Shraddha Flores MD  
 
Procedures: see electronic medical records for all procedures/Xrays and details which were not copied into this note but were reviewed prior to creation of Plan. LABS: 
I reviewed today's most current labs and imaging studies. Pertinent labs include: 
Recent Labs 04/08/21 
0103 04/07/21 
0230 WBC 3.9 5.3 HGB 11.5 11.6 HCT 38.2 39.6  281 Recent Labs 04/08/21 
1600 04/08/21 
0103 04/07/21 
0230 * 129* 137  
K 4.8 4.7 4.5  
 104 108 CO2 16* 15* 21  
* 248* 107* BUN 40* 37* 35* CREA 2.87* 2.75* 2.81* CA 8.8 9.1 9.1 MG  --  2.3  --   
PHOS  --  4.9*  --   
ALB  --   --  2.8* TBILI  --   --  1.4* ALT  --   --  38 Signed: Shraddha Flores MD

## 2021-04-08 NOTE — CONSULTS
CSS 
 History and Physical 
 
Subjective:  
  
Yadira Cedeno is a 63 y.o. female who was referred for cardiac evaluation by Dr. Gabe Franco for MR and CAD. The patient went to [facility] with CP and SOB at rest. She was admitted with a NSTEMI and transferred to [facility] for further care. She has a medical history of HTN, HLD, PAF/aflutter s/p AV node ablation, SSS s/p PPM, AV endocarditis, DM type II, CKD, neuropathy, left BKA, GERD, anemia, lymphedema, childhood asthma, obesity. She has a surgical history of bilateral mastectomy, left BKA, gastric bypass. She lives with her . She reports that she is generally independent in ADLs, cooking and cleaning at home. She uses a prosthesis but that right now it does not fit well do to swelling. She denies smoking and alcohol. She has a family history of heart disease. Cardiac Testing Cardiac catheterization: final report pending TTE 4/7/21:  
Left Ventricle Normal cavity size. Moderate concentric hypertrophy. The estimated EF is 20 - 25%. Severely reduced systolic function. Unable to assess diastolic function. Wall Scoring The following segments are hypokinetic: basal inferoseptal, mid anterior, mid anteroseptal, mid inferoseptal, apical septal and apex. Other segments could not be evaluated. Left Atrium Normal cavity size. Right Ventricle Dilated right ventricle. Mildly reduced systolic function. Pacing wire present . Right Atrium Mildly dilated right atrium. Pacemaker wire present in the right atrium. Aortic Valve Aortic valve not well visualized. No stenosis and no regurgitation. Mitral Valve No stenosis. Mitral valve non-specific thickening. Mild mitral annular calcification. Moderate to severe regurgitation. Tricuspid Valve Normal valve structure and no stenosis. Mild regurgitation. Pulmonic Valve Pulmonic valve not well visualized, but normal doppler findings. Aorta Normal aortic root.   
Pulmonary Artery Pulmonary arterial systolic pressure (PASP) is 47 mmHg. Pulmonary hypertension found to be moderate. Pericardium Normal pericardium and no evidence of pericardial effusion Past Medical History:  
Diagnosis Date  Acquired lymphedema Right arm  Acute respiratory failure ([address] 12/19/2020  Arrhythmia  Asthma  Atrial fibrillation ([address] Dr. Jannette Baldwin and Dr. Travis alves [facility])  Cancer ([address] bilateral breast  
 Chronic kidney disease  Diabetes mellitus type II   
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  
Procedure Laterality Date  HX AFIB ABLATION    
 HX AMPUTATION FOOT Left 04/2020  HX BREAST RECONSTRUCTION Bilateral   
 HX CARPAL TUNNEL RELEASE [address] [id number] [address] RIGHT MODIFIED RADICAL   
 HX MASTECTOMY Left   
 no lymphnode removal  
 HX MOHS PROCEDURES  1995  
 right  HX ORTHOPAEDIC Right   
 knee surgery  HX PACEMAKER  11/17/2020 Dr. Antonio Courser. Insertion of permanent pacemaker. AV node ablation  HX PACEMAKER    
 IR INSERT TUNL CVC W PORT OVER 5 YEARS    
 IR INSERT TUNL CVC W/O PORT OVER 5 YR  5/4/2020  IR INSERT TUNL CVC W/O PORT OVER 5 YR  9/8/2020  IR REMOVE TUNL CVAD W/O PORT / PUMP  6/26/2020  IR REMOVE TUNL CVAD W/O PORT / PUMP  10/19/2020  CO ABDOMEN SURGERY PROC UNLISTED    
 gastric bypass [address] AND 1994  FL GASTRIC BYPASS,OBESE<150CM SAW-EN-Y  7/08  
 [facility]  FL ICAR CATHETER ABLATION ATRIOVENTR NODE FUNCTION N/A 11/17/2020 ABLATION AV NODE performed by Jamarcus Elizabeth MD at [address] CATH LAB [address]  FL NEEDLE BIOPSY LIVER,W OTHR PROC  8.21.07  
 FL RELOCATION OF SKIN POCKET FOR PACEMAKER N/A 4/24/2020 RELOCATE [address] performed by Tolu Rothman MD at [address] [facility] CATH LAB  FL RMVL TRANSVNS PM ELTRD 1 LEAD SYS ATR/VENTR N/A 4/24/2020 Remove Pacemaker Dual Leads performed by Tolu Rothman MD at [facility] CARDIAC CATH LAB  FL RMVL TRANSVNS PM ELTRD 1 LEAD SYS ATR/VENTR N/A 4/27/2020 REMOVE PACEMAKER DUAL LEADS performed by [place]u [place] [address] CATH LAB  FL TCAT INSJ/RPL PERM LEADLESS PACEMAKER RV W/IMG N/A 11/17/2020 INSERT OR REPLACE TRANSCATH PPM LEADLESS performed by Jamarcus Elizabeth MD at [address] CATH LAB  FL TRABECULOPLASTY BY LASER SURGERY    
 US GUIDE ASP OVARIAN CYST ABD APPROACH  8.21.07  
 RIGHT Social History Tobacco Use  Smoking status: Never Smoker  Smokeless tobacco: Never Used Substance Use Topics  Alcohol use: Not Currently Family History Problem Relation Age of Onset  Cancer Mother  Heart Disease Mother  Alcohol abuse Father  Diabetes Brother  Hypertension Brother Prior to Admission medications Medication Sig Start Date End Date Taking? Authorizing Provider QUEtiapine (SEROqueL) 25 mg tablet Take 1 Tab by mouth two (2) times a day for 30 days. Indications: additional medications to treat depression 3/11/21 4/10/21 Yes Randolph Riojas MD  
apixaban (ELIQUIS) 5 mg tablet Take 1 Tab by mouth every twelve (12) hours for 30 days. 3/8/21 4/7/21 Yes Aurelia Roa MD  
carvediloL (COREG) 12.5 mg tablet Take 1 Tab by mouth two (2) times daily (with meals) for 30 days.  3/8/21 4/7/21 Yes Aurelia Roa MD  
bumetanide (BUMEX) 1 mg tablet Take 1 Tab by mouth two (2) times a day for 30 days. 3/8/21 4/7/21 Yes Rosanna Owen MD  
cholecalciferol (Vitamin D3) (1000 Units /25 mcg) tablet Take 1,000 Units by mouth daily. Yes Provider, Historical  
vitamin a-vitamin c-vit e-min (OCUVITE) tablet Take 1 Tab by mouth daily. Yes Provider, Historical  
cyanocobalamin (Vitamin B-12) 500 mcg tablet Take 500 mcg by mouth daily. Yes Provider, Historical  
pantoprazole (PROTONIX) 40 mg tablet Take 1 Tab by mouth Before breakfast and dinner. 12/7/20  Yes Evy Whitley MD  
sertraline (ZOLOFT) 100 mg tablet Take 100 mg by mouth daily. 10/30/20  Yes Provider, Historical  
sertraline (ZOLOFT) 25 mg tablet Take 25 mg by mouth daily. Take with 100 mg tablet every morning 10/6/20  Yes Provider, Historical  
busPIRone (BUSPAR) 10 mg tablet TAKE ONE TABLET BY MOUTH TWICE A DAY 5/24/19   Paige Bauman MD  
   
Allergies Allergen Reactions  Avapro [Irbesartan] Unable to Obtain  Bactrim [Sulfamethoxazole-Trimethoprim] Other (comments) Sore mouth  Contrast Agent [Iodine] Itching [address]  Morphine Nausea and Vomiting and Swelling  Penicillins Hives Did not tolerate cefepime 3/2020  Pravachol [Pravastatin] Nausea Only  Seafood [Shellfish Containing Products] Hives  Singulair [Montelukast] Other (comments)  
  palpitations  Ace Inhibitors Cough  Amlodipine Swelling  Captopril Cough  Carvedilol Diarrhea Review of Systems: pertinent positives in HPI Consititutional: Denies fever or chills. Eyes:  Denies use of glasses or vision problems(cataracts). ENT:  Denies hearing or swallowing difficulty. CV: Denies CP, claudication, HTN. Resp: Denies dyspnea, productive cough. : Denies dialysis or kidney problems. GI: Denies ulcers, esophageal strictures, liver problems. M/S: Denies joint or bone problems, or implanted artificial hardware. Skin: Denies varicose veins, edema.   
Neuro: Denies strokes, or TIAs. 
Psych: Denies anxiety or depression. Endocrine: Denies thyroid problems or diabetes. Heme/Lymphatic: Denies easy bruising or lymphedema. Objective:  
 
VS:  
Visit Vitals /73 Pulse 91 Temp 97.3 °F (36.3 °C) Resp 18 Ht 5' 10\" (1.778 m) Wt 256 lb 13.4 oz (116.5 kg) SpO2 94% BMI 36.85 kg/m² Physical Exam:   
General appearance: alert, cooperative, no distress Head: normocephalic, without obvious abnormality; atraumatic Eyes: conjunctivae/corneas clear; EOM's intact. Nose: nares normal; no drainage. Neck: no carotid bruit and no JVD Lungs: clear to auscultation bilaterally Heart: regular rate and rhythm; +4/6 murmur Abdomen: soft, non-tender; bowel sounds normal 
Extremities: moves all extremities; no weakness. Skin: Skin color normal; No varicose veins, 2+ LE edema. Neurologic: Grossly normal   
 
Labs:  
Recent Labs 04/08/21 
5071 04/08/21 
0103 WBC  --  3.9 HGB  --  11.5 HCT  --  38.2 PLT  --  252 NA  --  129* K  --  4.7 BUN  --  37* CREA  --  2.75* GLU  --  248* GLUCPOC 227*  --   
 
 
Diagnostics: CXR: FINDINGS: AP portable imaging of the chest performed at 5:36 AM demonstrates a 
stable cardiomediastinal silhouette. The lungs are clear bilaterally. There is 
unchanged elevation of the right hemidiaphragm. Postoperative changes are again 
seen in the lower cervical spine. 
  
IMPRESSION No evidence of acute cardiopulmonary process. Carotid doppler: none PFTS-FEV1: none EKG: Ventricular-paced rhythm Abnormal ECG When compared with ECG of 28-FEB-2021 17:30, No significant change was found Assessment:  
 
Active Problems: 
  Chest pain (11/23/2020) CHF (congestive heart failure) ([address] (4/7/2021) NSTEMI (non-ST elevated myocardial infarction) ([address] (4/7/2021) Plan: STS Risk Calculator V2.81 - Discussed by surgeon with patient. Procedure: Isolated CAB CALCULATE Risk of Mortality:  8.463% Renal Failure:  23.295% Permanent Stroke:  4.188% Prolonged Ventilation:  26.525% DSW Infection:  0.955% Reoperation:  2.559% Morbidity or Mortality:  54.060% Short Length of Stay:  10.537% Long Length of Stay:  19.414% Procedure: MV Repair + CAB CALCULATE Risk of Mortality:  10.912% Renal Failure:  24.612% Permanent Stroke:  4.538% Prolonged Ventilation:  38.064% DSW Infection:  1.045% Reoperation:  3.655% Morbidity or Mortality:  59.277% Short Length of Stay:  4.414% Long Length of Stay:  32.509% Procedure: MVR + CAB CALCULATE Risk of Mortality:  14.023% Renal Failure:  20.747% Permanent Stroke:  4.264% Prolonged Ventilation:  46.734% DSW Infection:  1.413% Reoperation:  6.046% Morbidity or Mortality:  67.447% Short Length of Stay:  3.066% Long Length of Stay:  40.917% Treatment Plan: 1. CAD/NSTEMI: On ASA, coreg, allergy to statin. Surgical plans per Dr. Annalise Patel. 2. Moderate-Severe MR: Surgical plans per Dr. Ananlise Patel. 3. Acute on chronic systolic heart failure (NYHA class IV on admission): EF 20%, worsening SOB and LE edema. proBNP > 90964 on admission. On coreg, bumex. No ACE dt kidneys. Recommend PA line placement. 4. Afib/flutter s/p ablation: Paced rhythm now. On eliquis PTA. 5. SSS s/p PPM: Paced rhythm. 6. DM type II: A1C 6.7, not on any medications PTA, diet controlled 7. PARAG on CKD stage 4: Renal consulted, avoid nephrotoxic meds, monitor. 8. Asthma: Pt reports this was in childhood, not currently being treated for asthma 9. HTN: cont coreg 10. BKA: left BKA, reports prosthetic not fitting well dt leg swelling 11. HLD: not on statin dt eleated LFTs 12. Anxiety: on zoloft 13. Hx Endocarditis: Previous TTE with vegetation on aortic valve? Signed By: Georgean Closs, NP April 8, 2021

## 2021-04-08 NOTE — PROGRESS NOTES
[address]  [phone] Cardiology Progress Note 4/8/2021 1:00 PM 
 
Admit Date: 4/7/2021 Admit Diagnosis:  
Chest pain [R07.9] NSTEMI (non-ST elevated myocardial infarction) ([facility] [I21.4] CH[facility] (congestive heart failure) ([facility] [I50.9] Subjective:  
 
Priya Sarabia is recovering post cardiac cath which showed MVD, elevated PA pressures, and mod/severe MR.  
 
CT surgery consulted. She is confused after receiving sedation, pulling at medical devices. VSS Cr 2.75, IV hydration as per nephrology Visit Vitals /73 Pulse 91 Temp 97.3 °F (36.3 °C) Resp 18 Ht 5' 10\" (1.778 m) Wt 116.5 kg (256 lb 13.4 oz) SpO2 94% BMI 36.85 kg/m² Current Facility-Administered Medications Medication Dose Route Frequency  carvediloL (COREG) tablet 3.125 mg  3.125 mg Oral BID WITH MEALS  
 bumetanide (BUMEX) tablet 1 mg  1 mg Oral BID  nitroglycerin (NITROSTAT) tablet 0.4 mg  0.4 mg SubLINGual PRN  
 sodium chloride (NS) flush 5-40 mL  5-40 mL IntraVENous Q8H  
 sodium chloride (NS) flush 5-40 mL  5-40 mL IntraVENous PRN  
 acetaminophen (TYLENOL) tablet 650 mg  650 mg Oral Q6H PRN Or  
 acetaminophen (TYLENOL) suppository 650 mg  650 mg Rectal Q6H PRN  polyethylene glycol (MIRALAX) packet 17 g  17 g Oral DAILY PRN  promethazine (PHENERGAN) tablet 12.5 mg  12.5 mg Oral Q6H PRN Or  
 ondansetron (ZOFRAN) injection 4 mg  4 mg IntraVENous Q6H PRN  
 insulin lispro (HUMALOG) injection   SubCUTAneous AC&HS  
 glucose chewable tablet 16 g  4 Tab Oral PRN  
 dextrose (D50W) injection syrg 12.5-25 g  12.5-25 g IntraVENous PRN  
 glucagon (GLUCAGEN) injection 1 mg  1 mg IntraMUSCular PRN  
 aspirin delayed-release tablet 81 mg  81 mg Oral DAILY  heparin (porcine) injection 2,000 Units  2,000 Units IntraVENous PRN Or  
 heparin (porcine) injection 4,000 Units  4,000 Units IntraVENous PRN  predniSONE (Rochele Blank) tablet 40 mg  40 mg Oral BID WITH MEALS  diphenhydrAMINE (BENADRYL) capsule 25 mg  25 mg Oral BID  famotidine (PEPCID) tablet 20 mg  20 mg Oral DAILY  fentaNYL citrate (PF) injection 25 mcg  25 mcg IntraVENous Q2H PRN Objective:  
  
Physical Exam: 
Gen: chronically ill appearing  female, ashen appearance, in NAD Abdomen: soft, non-tender. Bowel sounds normal, obese Extremities: left leg BKA, right leg trace edema, discolored. Right radial and right femoral sites CDI. Heart: regular rate and rhythm, no murmur, S3/JVD Lungs: diminished, crackles bilateral posterior bases Neck: supple, symmetrical, trachea midline Neurologic: alert and oriented x 4, calm Data Review:  
Recent Labs 04/08/21 
0103 04/07/21 
0230 WBC 3.9 5.3 HGB 11.5 11.6 HCT 38.2 39.6  281 Recent Labs 04/08/21 
0103 04/07/21 
0230 * 137  
K 4.7 4.5  
 108 CO2 15* 21  
* 107* BUN 37* 35* CREA 2.75* 2.81* CA 9.1 9.1 MG 2.3  --   
PHOS 4.9*  --   
ALB  --  2.8* TBILI  --  1.4* ALT  --  38 Recent Labs 04/07/21 
0230 TROIQ 28.50* Intake/Output Summary (Last 24 hours) at 4/8/2021 1300 Last data filed at 4/8/2021 0400 Gross per 24 hour Intake 100 ml Output 450 ml Net -350 ml  
  
 
Cardiographics 
  Telemetry: Paced ECG: Paced, no acute changes Echocardiogram: 3/1/2021 · LV: Estimated LVEF is 50 - 55%. Visually measured ejection fraction. Normal cavity size. Upper normal wall thickness. Low normal systolic function. · MV: Mitral annular calcification. Mild to moderate mitral valve regurgitation is present. · TV: Mild to moderate tricuspid valve regurgitation is present. · PA: Pulmonary arterial systolic pressure is 44 mmHg. · Image quality for this study was technically difficult. New ECHO 4/7/2021:  
· LV: Estimated LVEF is 20 - 25%. Normal cavity size. Moderate concentric hypertrophy. Severely reduced systolic function.  
· RV: Dilated right ventricle. Mildly reduced systolic function. Pacing wire present · MV: Mitral valve non-specific thickening. Mild mitral annular calcification. Moderate to severe mitral valve regurgitation is present. · TV: Mild tricuspid valve regurgitation is present. · PA: Moderate pulmonary hypertension. Pulmonary arterial systolic pressure is 47 mmHg. · RA: Mildly dilated right atrium. CXRAY: \"No focal infiltrate\" Assessment:  
 
Active Problems: 
  Chest pain (11/23/2020) CHF (congestive heart failure) ([facility] (4/7/2021) NSTEMI (non-ST elevated myocardial infarction) ([facility] (4/7/2021) Plan:  
Félix Reyna is a 63 y.o. female with a PMH significant for chronic PAF, right arm lymphadema (s/p lymphectomy), bilateral mastectomy for breast CA, HTN. HLD, neuropathy, left BKA, SSS, DM II, CKD, Asthma, A-flutter, long term use of OAC, endocarditis  admitted for Chest pain [R07.9] NSTEMI (non-ST elevated [facility] infarction) [facility] [I21.4] CHF (congestive heart failure) ([facility] [I50.9]. Cardiology consulted for NSTEMI.  
  
NSTEMI: s/p cardiac cath which showed MVD, mod/severe MR, and PHTN. Troponin peaked at 55, trended down to 28, no further trending necessary. Continue to monitor telemetry Admitted to hospitalist-may need ICU transfer, swan placement, and optimization of fluid status. Potential addition of milrinone Chest pain resolved at this moment of assessment EKG with no acute changes, paced Hold [facility] for her PAF for now ECHO showed new onset ICM; EF 20-25%. Patient reports allergies to contrast, premedicated with prednisone, benadryl, and pepcid- now DC'd Consult Cardiac surgery  
  
Acute on chronic systolic HF 
ECHO result as above Continue bumex 1 mg PO BID (as per nephrology), Coreg 3.125 mg PO BID,ASA, no ACE/ARB/Entresto due to CKD Nephrology consulted  
  
Parox A-fib s/p AV node ablation   
Hold [facility] Coreg for rate control  
  
Hypertension: controlled Continue diuretic and coreg  
  
  
PARAG on Chronic kidney disease stage IV : baseline 2-2.1 Renal function worse from baseline-Cr 2.75   
Avoid nephrotoxic substances Follow BMP Nephrology consulted, diuresing for congestion  
  
DM II: 
Manage per internal medicine  
  
HLD:  
LDL on 12/30/2020 21. Patient not on statin due to elevated LFT's, monitor.  
  
Anxiety:   
Continue Zoloft  
  
Dorothy Stewart, NP  
DNP,APRN,AG-ACNP-BC Patient seen and examined by me with the above nurse practitioner. I personally performed all components of the history, physical, and medical decision making and agree with the assessment and plan with minor modifications as noted. Today the patient denies chest pain or shortness of breath. Had some CP overnight. General PE Gen:  NAD Mental Status - Alert. General Appearance - Not in acute distress. HEENT: 
PERRL, no carotid bruits or JVD Chest and Lung Exam  
Inspection: Accessory muscles - No use of accessory muscles in breathing. Auscultation:  
Breath sounds: - Normal.  
Cardiovascular Inspection: Jugular vein - Bilateral - Inspection Normal.  
Palpation/Percussion:  
Apical Impulse: - Normal.  
Auscultation: Rhythm - Regular. Heart Sounds - S1 WNL and S2 WNL. No S3 or S4. Murmurs & Other Heart Sounds: Auscultation of the heart reveals - No Murmurs. Peripheral Vascular Upper Extremity: Inspection - Bilateral - No Cyanotic nailbeds or Digital clubbing. Lower Extremity:  
Palpation: Edema - Bilateral - No edema. Abdomen:   Soft, non-tender, bowel sounds are active. Neuro: A&O times 3, CN and motor grossly WNL Cardiac catheterization/echo revealed multivessel CAD, severe cardiomyopathy, EF 20 to 25%, with moderate to severe mitral regurgitation. Consulting cardiac surgery. Hydrate gently post catheterization.   Consider MIKE tomorrow to further assess mitral regurgitation, also indwelling oximetric [place] catheterization in the ICU with possible inotropes depending on results for optimization.

## 2021-04-08 NOTE — CARDIO/PULMONARY
Cardiac Rehab Note: chart review/referral  
 
Pt is a 62 yo admitted with NSTEMI, s/p cardiac cath with MVD, mod/severe MR, CTS consulted per NP entry. Treatment plan to yanet determined. EF 25%  on 4/7/21 per echo. Smoking history assessed. Patient is a never smoker. Smoking Cessation Program link has not been added to the AVS. Patient not seen at this time due to current condition as indicated in chart. CP Rehab will see as indicated.

## 2021-04-08 NOTE — PROGRESS NOTES
TRANSFER - OUT REPORT: 
 
Verbal report given to Casimiro Mena RN(name) on Rajan Garcia  being transferred to CCU 2538(unit) for routine progression of care Report consisted of patients Situation, Background, Assessment and  
Recommendations(SBAR). Information from the following report(s) SBAR, Kardex, Procedure Summary, MAR, Recent Results and Cardiac Rhythm paced was reviewed with the receiving nurse. Lines:  
Peripheral IV 04/04/21 Left Arm (Active) Site Assessment Clean, dry, & intact 04/07/21 1905 Phlebitis Assessment 0 04/07/21 1905 Infiltration Assessment 0 04/07/21 1905 Dressing Status Clean, dry, & intact 04/07/21 1905 Dressing Type Tape;Transparent 04/07/21 1905 Hub Color/Line Status Flushed;Patent 04/07/21 1905 Action Taken Open ports on tubing capped 04/07/21 1905 Alcohol Cap Used Yes 04/07/21 1905 Peripheral IV 04/04/21 Anterior;Distal;Left Forearm (Active) Site Assessment Clean, dry, & intact 04/07/21 1905 Phlebitis Assessment 0 04/07/21 1905 Infiltration Assessment 0 04/07/21 1905 Dressing Status Clean, dry, & intact 04/07/21 1905 Dressing Type Tape;Transparent 04/07/21 1905 Hub Color/Line Status Flushed;Patent 04/07/21 1905 Action Taken Open ports on tubing capped 04/07/21 1905 Alcohol Cap Used Yes 04/07/21 1905 Opportunity for questions and clarification was provided. Patient transported with: 
 Monitor Registered Nurse

## 2021-04-08 NOTE — PROGRESS NOTES
Nephrology Progress Note Monroe Kiser  
 
[facility]. [facility]  Phone - [phone] Patient: Chirag Zazueta    YOB: 1960 Date- 4/8/2021   Admit Date: 4/7/2021 CC: Follow up for  PARAG on CKD stage 4 IMPRESSION & PLAN:  
PARAG stage 1 on CKD stage 4: 
-suspect CKD sec to DM2 and HTN and PARAG sec to CRS from suppressed EF from recent MI. 
-currently hypervolemic and non oliguric. 
- S/P LHC, Cr better prior to procedure 
-Will stop IV NS 
-recheck BMP again at 3 pm today. 
-check UA. 
  
NSTEMI: 
-S/P LHC, per cath labs diffuse CAD 
-Will need CABG 
-On IV heparin. 
  
Systolic HF: 
-Will continue to aggressively diurese her. 
  
DM2: 
-Per Im 
  
HTN: 
-Avoid Ace/ARB for now. 
   
Subjective: Interval History: -  Feels sleepy, seen after LHC. Objective:  
Vitals:  
 04/08/21 1100 04/08/21 1130 04/08/21 1155 04/08/21 1200 BP: 97/60 101/64 109/66 106/73 Pulse: 90 91 91 91 Resp:      
Temp:      
SpO2: 95% 99% 96% 94% Weight:      
Height:      
  
04/07 0701 - 04/08 0700 In: 100 [P.O.:100] Out: 450 [Urine:450] Last 3 Recorded Weights in this Encounter 04/07/21 0157 04/07/21 1124 04/08/21 0300 Weight: 111.3 kg (245 lb 6 oz) 111.1 kg (245 lb) 116.5 kg (256 lb 13.4 oz) Physical exam:  
GEN: NAD NECK- Supple, no mass RESP: No wheezing, Clear b/l CVS: S1,S2  RRR 
NEURO: Normal speech, Non focal 
EXT: No Edema PSYCH: Normal Mood Chart reviewed. Pertinent Notes reviewed. Data Review : 
Recent Labs 04/08/21 
0103 04/07/21 
0230 * 137  
K 4.7 4.5  
 108 CO2 15* 21 BUN 37* 35* CREA 2.75* 2.81* * 107* CA 9.1 9.1 MG 2.3  --   
PHOS 4.9*  --   
 
Recent Labs 04/08/21 
0103 04/07/21 
0230 WBC 3.9 5.3 HGB 11.5 11.6 HCT 38.2 39.6  281 No results for input(s): FE, TIBC, PSAT, FERR in the last 72 hours. Medication list  reviewed Current Facility-Administered Medications Medication Dose Route Frequency  carvediloL (COREG) tablet 3.125 mg  3.125 mg Oral BID WITH MEALS  
 bumetanide (BUMEX) tablet 1 mg  1 mg Oral BID  nitroglycerin (NITROSTAT) tablet 0.4 mg  0.4 mg SubLINGual PRN  
 sodium chloride (NS) flush 5-40 mL  5-40 mL IntraVENous Q8H  
 sodium chloride (NS) flush 5-40 mL  5-40 mL IntraVENous PRN  
 acetaminophen (TYLENOL) tablet 650 mg  650 mg Oral Q6H PRN Or  
 acetaminophen (TYLENOL) suppository 650 mg  650 mg Rectal Q6H PRN  polyethylene glycol (MIRALAX) packet 17 g  17 g Oral DAILY PRN  promethazine (PHENERGAN) tablet 12.5 mg  12.5 mg Oral Q6H PRN Or  
 ondansetron (ZOFRAN) injection 4 mg  4 mg IntraVENous Q6H PRN  
 insulin lispro (HUMALOG) injection   SubCUTAneous AC&HS  
 glucose chewable tablet 16 g  4 Tab Oral PRN  
 dextrose (D50W) injection syrg 12.5-25 g  12.5-25 g IntraVENous PRN  
 glucagon (GLUCAGEN) injection 1 mg  1 mg IntraMUSCular PRN  
 aspirin delayed-release tablet 81 mg  81 mg Oral DAILY  heparin (porcine) injection 2,000 Units  2,000 Units IntraVENous PRN Or  
 heparin (porcine) injection 4,000 Units  4,000 Units IntraVENous PRN  predniSONE (DELTASONE) tablet 40 mg  40 mg Oral BID WITH MEALS  diphenhydrAMINE (BENADRYL) capsule 25 mg  25 mg Oral BID  famotidine (PEPCID) tablet 20 mg  20 mg Oral DAILY  fentaNYL citrate (PF) injection 25 mcg  25 mcg IntraVENous Q2H PRN Gwen Sorto, D[facility] / [employer] [address] [place], [address] [facility] Phone - [phone]               Fax - [phone]

## 2021-04-08 NOTE — PROGRESS NOTES
Transition of Care Plan: 
 
RUR: 40% Disposition: Home with family Follow up appointments: PCP, Cardiology DME needed: none Transportation at Discharge:  [facility] or  to provide at d/c  
Keys or means to access home:   has access to home IM Medicare letter:  N/A Caregiver Contact: Aurelia Terrell fus- [phone] Discharge Caregiver contacted prior to discharge? CM to contact at d/c Reason for Readmission:     Chest Pain RUR Score/Risk Level:     40% PCP: First and Last name:  Altaf Ng Name of Practice:  
 Are you a current patient: Yes/No:  Yes Approximate date of last visit:  
 Can you participate in a virtual visit with your PCP:  Yes Is a Care Conference indicated:  No 
 
Did you attend your follow up appointment (s): If not, why not: 
Went to [facility] Resources/supports as identified by patient/family:   Pt has supportive  Top Challenges facing patient (as identified by patient/family and CM): Finances/Medication cost?     Has [facility] Transportation       provide transportation Support system or lack thereof?  is caregiver Living arrangements? Lives in one story home with . Self-care/ADLs/Cognition? Pt is alert and oriented x4.  is caregiver. Pt is generally  independent with ADL's. Pt is L BKA - has prothesis- currently not wearing due to swelling. DME: hospital bed, wheelchair, wheelchair ramp, BSC, shower chair, walker. Current Advanced Directive/Advance Care Plan:   
 
Advance Care Planning General Advance Care Planning (ACP) Conversation Date of Conversation: 04/8/21 Conducted with: Patient with Decision Making Capacity Healthcare Decision Maker:  
  Primary Decision Maker: Adonis Winn - Spouse - [phone] Click here to complete [address] including selection of the Healthcare Decision Maker Relationship (ie \"Primary\") Today we documented Decision Maker(s) consistent with ACP documents on file. Content/Action Overview: Has ACP document(s) on file - reflects the patient's care preferences Reviewed DNR/DNI and patient elects Full Code (Attempt Resuscitation) Length of Voluntary ACP Conversation in minutes:  20 minutes Sharron Stout RN Plan for utilizing home health:   No plans. Pt declined SNF or HH if offered. Will go to [facility]. Transition of Care Plan:    Based on readmission, the patient's previous Plan of Care 
 has been evaluated and/or modified. The current Transition of Care Plan is:       Home with family CM introduced self and role to pt. CM verified with pt, pt demographics, insurance info and ACD on file. Pt is readmission from 2/28/21 to 3/11/21 from CHF d/c to [facility]. Home from [facility] at the end of March. Care Management Interventions PCP Verified by CM: Yes Mode of Transport at Discharge: Other (see comment)( to provide transportation ) Transition of Care Consult (CM Consult): Discharge Planning Discharge Durable Medical Equipment: No(BSC, Wheelchair, wheelchair ramp, shower chair, hospital bed) Current Support Network: Lives with Spouse Confirm Follow Up Transport: Family Readmission Assessment Who is being interviewed?: Patient What was the patient's/caregiver's perception as to why they think they needed to return back to the hospital?: Other (Comment)(Chest Pain) Did you visit your Primary Care Physician after you left the hospital, before you returned this time?: No 
Did you see a specialist, such as Cardiac, Pulmonary, Orthopedic Physician, etc. after you left the hospital?: No 
Who advised the patient to return to the hospital?: Self-referral 
Does the patient report anything that got in the way of taking their medications?: No 
 
[facility]  [facility] 
Phone: [phone]

## 2021-04-08 NOTE — PROGRESS NOTES
1905 (4/7/2021) - Bedside shift change report given to José Miguel Murdock RN (oncoming nurse) by Juan Ramon Stout RN  (offgoing nurse). Report included the following information SBAR, Kardex, Intake/Output, MAR, Accordion, Recent Results, Med Rec Status, Cardiac Rhythm Paced, Alarm Parameters , Pre Procedure Checklist, Procedure Verification, Quality Measures and Dual Neuro Assessment. 2000 - PM CHG bath, site prep and consent are completed. 2241 - Pt. Reported CP 8/10 (sore/aching/pressure), all vital signs are stable. RN administered NITROSTAT 1st dose 2248 - Pt. Reported CP 6/10 (sore/aching/pressure), RN administered NITROSTAT 2nd dose 2255 - Pt. Reported CP 5/10 (sore/aching/pressure), RN administered NITROSTAT 3rd dose, EKG is completed (\"Ventricular-paced rhythm\"). RN Notified Dr. Tran Buckner of CP and received order for for Fentanyl 25-50 mcg IV now and Fentanyl 25 mcg q2hr PRN up to 4 dosages, CXR in AM and NS IVF 75 ml/hr. 2337 - Pt. Reported constant CP (sore/aching/ no pressure) 5/10, RN administered Fentanyl 50 mcg IV and Zofran for nausea. 2357 - Pt. Reported constant CP (sore/aching) 4/10, all vital sings are stable. 0020 (4/8/2021) - Pt. Reported constant CP (sore/aching) 2/10, all vital sings are stable. 0100 - Pt. Denied CP, SOB and nausea, all vital sings are stable. Collected blood from left hand for aPTT lab and tubed to Lab. 
 
0200 -  Pt. Is resting in bed without distress. 9622 - Received call form lab for Critical CO2: 15, Pt. is resting in bed, without distress. Pt. is asymptomatic and stable vital signs. RN messaged hospitalist NP. 
 
1998 - Received order for CBC lab for AM from hospitalist NP via [facility] hematology to follow up aPTT result. 0305- aPTT: 67.4, confirm with pharmacist Latosha Leslie) to continue heparin infusion at 17 units/kg/hr (no rate change / THERAPEUTIC aPTT) 6168 - Unable to administer Coreg at this time d/t BP: 94/59.

## 2021-04-09 NOTE — PROGRESS NOTES
[address]  [phone] Cardiology Progress Note 4/9/2021 0915 AM  
 
Admit Date: 4/7/2021 Admit Diagnosis:  
Chest pain [R07.9] NSTEMI (non-ST elevated myocardial infarction) ([facility] [I21.4] CHF (congestive heart failure) ([facility] [I50.9] Subjective:  
 
Priya Sarabia is in the process of having a swan placed, sedated, calm. CT surgery consulted awaiting their final decisions after swan coco placed VSS, BP low to normal  
Cr 3.12, worse, management as per nephrology may need UF Visit Vitals BP (!) 109/52 Pulse 91 Temp 99.3 °F (37.4 °C) Resp 15 Ht 5' 10\" (1.778 m) Wt 256 lb 13.4 oz (116.5 kg) SpO2 98% BMI 36.85 kg/m² Current Facility-Administered Medications Medication Dose Route Frequency  pantoprazole (PROTONIX) tablet 40 mg  40 mg Oral ACB&D  DOBUTamine (DOBUTREX) 500 mg/250 mL (2,000 mcg/mL) infusion  0-10 mcg/kg/min IntraVENous TITRATE  ticagrelor (BRILINTA) tablet 90 mg  90 mg Oral Q12H  
 [Held by provider] carvediloL (COREG) tablet 3.125 mg  3.125 mg Oral BID WITH MEALS  insulin glargine (LANTUS) injection 10 Units  10 Units SubCUTAneous QHS  insulin lispro (HUMALOG) injection 5 Units  5 Units SubCUTAneous TIDAC  alcohol 62% (NOZIN) nasal  1 Ampule  1 Ampule Topical Q12H  
 heparin (porcine) injection 5,000 Units  5,000 Units SubCUTAneous Q8H  
 bumetanide (BUMEX) tablet 1 mg  1 mg Oral BID  nitroglycerin (NITROSTAT) tablet 0.4 mg  0.4 mg SubLINGual PRN  
 sodium chloride (NS) flush 5-40 mL  5-40 mL IntraVENous Q8H  
 sodium chloride (NS) flush 5-40 mL  5-40 mL IntraVENous PRN  
 acetaminophen (TYLENOL) tablet 650 mg  650 mg Oral Q6H PRN Or  
 acetaminophen (TYLENOL) suppository 650 mg  650 mg Rectal Q6H PRN  polyethylene glycol (MIRALAX) packet 17 g  17 g Oral DAILY PRN  promethazine (PHENERGAN) tablet 12.5 mg  12.5 mg Oral Q6H PRN  Or  
 ondansetron (ZOFRAN) injection 4 mg  4 mg IntraVENous Q6H PRN  
 insulin lispro (HUMALOG) injection   SubCUTAneous AC&HS  
 glucose chewable tablet 16 g  4 Tab Oral PRN  
 dextrose (D50W) injection syrg 12.5-25 g  12.5-25 g IntraVENous PRN  
 glucagon (GLUCAGEN) injection 1 mg  1 mg IntraMUSCular PRN  
 aspirin delayed-release tablet 81 mg  81 mg Oral DAILY  fentaNYL citrate (PF) injection 25 mcg  25 mcg IntraVENous Q2H PRN Objective:  
  
Physical Exam: 
Gen: chronically ill appearing  female, ashen appearance, in NAD Abdomen: soft, non-tender. Bowel sounds normal, obese Extremities: left leg BKA, right leg trace edema, discolored. Right radial and right femoral sites CDI. Heart: regular rate and rhythm, no murmur, S3/JVD Lungs: diminished, crackles bilateral posterior bases Neck: supple, symmetrical, trachea midline Neurologic: alert and oriented x 4, calm Data Review:  
Recent Labs 04/09/21 
4113 04/08/21 
0103 04/07/21 
0230 WBC 5.2 3.9 5.3 HGB 12.1 11.5 11.6 HCT 40.2 38.2 39.6  252 281 Recent Labs 04/09/21 
1132 04/09/21 
0558 04/08/21 
1600 04/08/21 
0103 04/07/21 
0230 * 131* 131* 129* 137  
K 4.5 5.3* 4.8 4.7 4.5  
 103 103 104 108 CO2 20* 17* 16* 15* 21 * 210* 330* 248* 107* BUN 42* 44* 40* 37* 35* CREA 3.03* 3.12* 2.87* 2.75* 2.81* CA 8.9 9.2 8.8 9.1 9.1 MG  --  2.5*  --  2.3  --   
PHOS  --  5.4*  --  4.9*  --   
ALB  --  2.9*  --   --  2.8* TBILI  --  1.4*  --   --  1.4* ALT  --  30  --   --  38 Recent Labs 04/07/21 
0230 TROIQ 28.50* Intake/Output Summary (Last 24 hours) at 4/9/2021 2046 Last data filed at 4/9/2021 1700 Gross per 24 hour Intake 396.52 ml Output 491 ml Net -94.48 ml Cardiographics 
  Telemetry: Paced ECG: Paced, no acute changes Echocardiogram: 3/1/2021 · LV: Estimated LVEF is 50 - 55%. Visually measured ejection fraction. Normal cavity size. Upper normal wall thickness. Low normal systolic function. · MV: Mitral annular calcification. Mild to moderate mitral valve regurgitation is present. · TV: Mild to moderate tricuspid valve regurgitation is present. · PA: Pulmonary arterial systolic pressure is 44 mmHg. · Image quality for this study was technically difficult. New ECHO 4/7/2021:  
· LV: Estimated LVEF is 20 - 25%. Normal cavity size. Moderate concentric hypertrophy. Severely reduced systolic function. · RV: Dilated right ventricle. Mildly reduced systolic function. Pacing wire present · MV: Mitral valve non-specific thickening. Mild mitral annular calcification. Moderate to severe mitral valve regurgitation is present. · TV: Mild tricuspid valve regurgitation is present. · PA: Moderate pulmonary hypertension. Pulmonary arterial systolic pressure is 47 mmHg. · RA: Mildly dilated right atrium. CXRAY: \"No focal infiltrate\" \"lungs are clear\" Assessment:  
 
Active Problems: 
  Chest pain (11/23/2020) CHF (congestive heart failure) ([address] (4/7/2021) NSTEMI (non-ST elevated myocardial infarction) ([address] (4/7/2021) S/P cardiac cath (4/8/2021) Overview: 4/8/2021: cardiac cath showed MVD. Mod/severe MR, elevated PA pressures Plan:  
Good Quan is a 63 y.o. female with a PMH significant for chronic PAF, right arm lymphadema (s/p lymphectomy), bilateral mastectomy for breast CA, HTN. HLD, neuropathy, left BKA, SSS, DM II, CKD, Asthma, A-flutter, long term use of OAC, endocarditis  admitted for Chest pain [R07.9] NSTEMI (non-ST elevated myocardial infarction) ([address] [I21.4] CHF (congestive heart failure) ([address] [I50.9].   
NSTEMI: s/p cardiac cath which showed MVD, mod/severe MR, and PHTN. Troponin peaked at 55, trended down to 28, no further trending necessary. Continue to monitor telemetry Admitted to hospitalist-transfer to ICU for swan placement, and optimization of fluid status. Potential addition of milrinone Chest pain resolved EKG with no acute changes, paced Hold [address] for her PAF for now ECHO showed new onset ICM; EF 20-25%. Patient reports allergies to contrast, premedicated with prednisone, benadryl, and pepcid- now DC'd Consult Cardiac surgery for evaluation for surgery and optimization Addendum 6:22 PM by Dr. Luisito Hendricks: Patient declined for cardiac surgery due to prohibitively high riskinitiate Brilinta in preparation for possible high risk PCI when optimized Considering MIKE for further assessment of MR 
  
Acute on chronic systolic HF 
ECHO result as above Continue bumex 1 mg PO BID (as per nephrology), Coreg 3.125 mg-held this am for hypotension ,ASA, no ACE/ARB/Entresto due to CKD Nephrology consulted  
  
Parox A-fib s/p AV node ablation   
Hold [address] [facility] Coreg for rate control-held this am  
  
Hypertension: controlled Continue diuretic  
  
  
PARAG on Chronic kidney disease stage IV : baseline 2-2.1 Renal function worse from baseline-Cr 3.12 Avoid nephrotoxic substances Follow BMP Nephrology consulted, diuresing for congestion, may need UF 
  
DM II: 
Manage per internal medicine  
  
HLD:  
LDL on 12/30/2020 21. Patient not on statin due to elevated LFT's, monitor.  
  
Anxiety:   
Continue Zoloft  
  
Merril Osbaldo, NP  
DNP,APRN,AG-ACNP-BC Patient seen and examined by me with the above nurse practitioner. I personally performed all components of the history, physical, and medical decision making and agree with the assessment and plan with minor modifications as noted. Today the patient had [place]rena-Brunilda catheter placed, revealing significantly elevated pressures, and severely reduced cardiac index at 0.1. Hypotensive and started on dobutamine. General PE Gen:  NAD, appears somewhat lethargic and pale Mental Status - Alert. General Appearance - Not in acute distress. HEENT: 
PERRL, no carotid bruits or JVD Chest and Lung Exam  
Inspection: Accessory muscles - No use of accessory muscles in breathing. Auscultation:  
Breath sounds: - Normal.  
Cardiovascular Inspection: Jugular vein - Bilateral - Inspection Normal.  
Palpation/Percussion:  
Apical Impulse: - Normal.  
Auscultation: Rhythm - Regular. Heart Sounds - S1 WNL and S2 WNL. No S3 or S4. Murmurs & Other Heart Sounds: Auscultation of the heart reveals - No Murmurs. Peripheral Vascular Upper Extremity: Inspection - Bilateral - No Cyanotic nailbeds or Digital clubbing. Lower Extremity:  
Palpation: Edema - Bilateral - No edema. right groin no bruit or hematoma Abdomen:   Soft, non-tender, bowel sounds are active. Neuro: A&O times 3, CN and motor grossly WNL Today the patient had [place]-Brunilda catheter placed, revealing significantly elevated pressures, and severely reduced cardiac index. Hypotensive and started on dobutamine. Patient has been declined for cardiac surgery due to prohibitively high risk. Holding off on transesophageal echo for moderate to severe mitral regurgitation as it will not immediately . She remains pain-free. Plan is to try to optimize hemodynamics with dobutamine, and remove volume with elevated pulmonary pressures with the help of renal.  Tentative hope for high risk PCI when better optimized. Cardiac index is improved from 1.1-1.5 and blood pressure improved as well after starting dobutamine.
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
## 2021-04-09 NOTE — PROGRESS NOTES
19: 00-Bedside and Verbal shift change report given to GURINDER Vidal (oncoming nurse) by Sunil Bojorquez (offgoing nurse). Report included the following information SBAR, Kardex, Intake/Output, MAR and Recent Results. 19:45-Spoke to Dr. Juan Gamino, transferred MD to room phone to talk to pt. 
20:00-Complex assessment completed, mouth and pericare done. After speaking with pt and anesthesia,  Dr. Michelle Gomez will hold Brogan placement until tomorrow morning, and MIKE tentatively scheduled for 14:00. Clear liquids until 08:00 then NPO with meds @09:00. 
23:30-Pt started to screaming and becoming confused, calling out to  Radhaxavi Davalosizzard, very upset with Radha Blizzard. Reoriented and pt was able to answer questions correctly. 00:00-Reassessed, still oriented no mouth care accepted, and purwick checked. 03:30-Attempted labs, pt screamed and no tolerant, will try again later. 04:00-Reassessed, no changes, mouth care done. 07:00-Bedside and Verbal shift change report given to  [facility] (oncoming nurse) by Mitch Gardner  (offgoing nurse). Report included the following information SBAR, Kardex, Intake/Output, MAR and Recent Results. CHG bath done.

## 2021-04-09 NOTE — PROGRESS NOTES
Central Line and Pulmonary Artery Catheter Note Indication: Need for vasopressors, CHF Risks, benefits, alternatives explained and patient agrees to proceed. Patient positioned in Trendelenburg. 7-Step Sterility Protocol followed. (cap, mask sterile gown, sterile gloves, large sterile sheet, hand hygiene, 2% chlorhexidine for cutaneous antisepsis) 5 mL 1% Lidocaine placed at insertion site. Live Ultrasound scan performed. Right internal jugular cannulated x 1 attempt(s) utilizing the Seldinger technique. Venous cannulation confirmed with column drop test prior to dilation. Catheter secured & Biopatch applied. Sterile Tegaderm placed. 9 Fr Cordis and 8 Fr CCO PA Catheter. PAC floated using pressure/waveform guidance. PAP 60/34. CXR pending.

## 2021-04-09 NOTE — PROGRESS NOTES
Nephrology Progress Note Monroe Kiser  
 
[email or web address]. [email or web address]  Phone - [phone] Patient: Layton Hale    YOB: 1960 Date- 4/9/2021   Admit Date: 4/7/2021 CC: Follow up for  PARAG on CKD stage 4 IMPRESSION & PLAN:  
PARAG stage 1 on CKD stage 4: 
-suspect CKD sec to DM2 and HTN and PARAG sec to CRS from suppressed EF from recent MI and NOE. -currently hypervolemic and  oliguric. 
- UOP declined,Cr elevated and potassium at 5.3 
-recheck BMP again stat 
-If becoming hyperkalemic then  She will need Glenn placement and HD today. 
  
NSTEMI: 
-S/P LHC, per cath labs diffuse CAD 
-not a candidate for CABg 
-Plan for PCI next week. Isaac Soliz shows elevated RV pressure with poor CI. 
  
Systolic HF: 
-Will continue to aggressively diurese her. 
  
DM2: 
-Per Im 
  
HTN: 
-Avoid Ace/ARB for now. 
   
Subjective: Interval History: - Seen this am 
-transferred to ICU for rohit. 
-Feels tired. -UOP declining/ 
 
Objective:  
Vitals:  
 04/09/21 0600 04/09/21 0700 04/09/21 0800 04/09/21 0900 BP: 98/67  96/67 97/66 Pulse: 91 90 90 91 Resp: 19 17 16 19 Temp:    98 °F (36.7 °C) SpO2: 100% 100% 98% 100% Weight:      
Height:      
  
04/08 0701 - 04/09 0700 In: 1210 [P.O.:210; I.V.:1000] Out: 401 [Urine:400] Last 3 Recorded Weights in this Encounter 04/07/21 0157 04/07/21 1124 04/08/21 0300 Weight: 111.3 kg (245 lb 6 oz) 111.1 kg (245 lb) 116.5 kg (256 lb 13.4 oz) Physical exam:  
GEN: NAD NECK- Supple, no mass RESP: No wheezing, Clear b/l CVS: S1,S2  RRR 
NEURO: Normal speech, Non focal 
EXT: No Edema PSYCH: Normal Mood Chart reviewed. Pertinent Notes reviewed. Data Review : 
Recent Labs 04/09/21 
0558 04/08/21 
1600 04/08/21 
0103 * 131* 129*  
K 5.3* 4.8 4.7  103 104 CO2 17* 16* 15* BUN 44* 40* 37* CREA 3.12* 2.87* 2.75* * 330* 248* CA 9.2 8.8 9.1 MG 2.5*  --  2.3 PHOS 5.4*  -- 4.9*  
 
Recent Labs 04/09/21 
9567 04/08/21 
0103 04/07/21 
0230 WBC 5.2 3.9 5.3 HGB 12.1 11.5 11.6 HCT 40.2 38.2 39.6  252 281 No results for input(s): FE, TIBC, PSAT, FERR in the last 72 hours. Medication list  reviewed Current Facility-Administered Medications Medication Dose Route Frequency  [Held by provider] carvediloL (COREG) tablet 3.125 mg  3.125 mg Oral BID WITH MEALS  insulin glargine (LANTUS) injection 10 Units  10 Units SubCUTAneous QHS  insulin lispro (HUMALOG) injection 5 Units  5 Units SubCUTAneous TIDAC  alcohol 62% (NOZIN) nasal  1 Ampule  1 Ampule Topical Q12H  
 heparin (porcine) injection 5,000 Units  5,000 Units SubCUTAneous Q8H  
 bumetanide (BUMEX) tablet 1 mg  1 mg Oral BID  nitroglycerin (NITROSTAT) tablet 0.4 mg  0.4 mg SubLINGual PRN  
 sodium chloride (NS) flush 5-40 mL  5-40 mL IntraVENous Q8H  
 sodium chloride (NS) flush 5-40 mL  5-40 mL IntraVENous PRN  
 acetaminophen (TYLENOL) tablet 650 mg  650 mg Oral Q6H PRN Or  
 acetaminophen (TYLENOL) suppository 650 mg  650 mg Rectal Q6H PRN  polyethylene glycol (MIRALAX) packet 17 g  17 g Oral DAILY PRN  promethazine (PHENERGAN) tablet 12.5 mg  12.5 mg Oral Q6H PRN Or  
 ondansetron (ZOFRAN) injection 4 mg  4 mg IntraVENous Q6H PRN  
 insulin lispro (HUMALOG) injection   SubCUTAneous AC&HS  
 glucose chewable tablet 16 g  4 Tab Oral PRN  
 dextrose (D50W) injection syrg 12.5-25 g  12.5-25 g IntraVENous PRN  
 glucagon (GLUCAGEN) injection 1 mg  1 mg IntraMUSCular PRN  
 aspirin delayed-release tablet 81 mg  81 mg Oral DAILY  fentaNYL citrate (PF) injection 25 mcg  25 mcg IntraVENous Q2H PRN Melany Quiet, [address] [facility] [employer] [address] [place], [address] Phone - [phone]               Fax - [phone]

## 2021-04-09 NOTE — PROGRESS NOTES
0700-Bedside shift change report given to Malathi Rick (oncoming nurse) by Estrella Mejia (offgoing nurse). Report included the following information SBAR, Kardex and MAR.  
 
2704- Patient assessed. See flowsheet. 1130- Munroe inserted. BMP drawn and sent. 1153- Fentanyl given for 8/10 pain. 1200- BP low. Dr. Markos Santos contacted. Orders received to administer a 250 cc fluid bolus. 1210- Reassessment completed see flowsheet. Fluid bolus given. 1245- BP remains low. Dr. Sindhu Gaxiola contacted. He requests that I contact Cardiology. Ned Powers, NP for Dr. Mandi Mccall. Cristobal Hoffman states she will contact Dr. Mandi Mccall and order a pressor. 1313- Dobutamine started. 1530- Reassessed. No changes. Cardiac output and index have improved with the patient's dobutamine gtt. 1900- Report given to the oncoming shift.

## 2021-04-09 NOTE — PROGRESS NOTES
[facility] 
 
ICU TEAM Progress Note Name: Nelsy Rodriguez : 1960 MRN: [id number] Date: 2021 Subjective:  
Progress Note: 2021 Ms Charise Mcardle is a 64 yo female with a PMH of mitral regurgitation, CAD, HTN, HLD, paroxysmal atrial fibrillation/atrial flutter s/p AV node ablation, sick sinus syndrome s/p pacemaker, AV endocarditis, DM2, CKD4 (baseline Cr 2-2.2), neuropathy, L BKA, GERD, anemia, lymphedema, and gastric emma-en-y. As per chart review, she presented to [facility] as a transfer from [facility] on . At the OSH, she had been treated for chest pain, NSTEMI, and HF. Troponin peaked at 55 and downtrended to 28. TTE from [facility] showed EF 10-15% (prior normal EF). Repeat TTE pending at [facility]. At [facility], she has been seen an evaluated by Dr Viral Schilling. Of note, patient has allergies to contrast (noted in allergy list). POD:Day of Surgery S/P: Procedure(s): LEFT AND RIGHT HEART CATH / CORONARY ANGIOGRAPHY Aortagram Abdominal W Runoff Active Problem List:  
 
Problem List  Date Reviewed: 3/1/2021 Codes Class S/P atrioventricular rolando ablation ICD-10-CM: Z98.925 ICD-9-CM: V45.89 S/P cardiac cath ICD-10-CM: Z98.890 ICD-9-CM: V45.89 Overview Signed 2021  1:01 PM by Elsa Payton NP  
  2021: cardiac cath showed MVD. Mod/severe MR, elevated PA pressures CHF (congestive heart failure) (HCC) ICD-10-CM: I50.9 ICD-9-CM: 428.0   
   
 NSTEMI (non-ST elevated myocardial infarction) ([facility] [address] ICD-10-CM: I21.4 ICD-9-CM: 410.70 Gastroesophageal reflux disease without esophagitis ICD-10-CM: K21.9 ICD-9-CM: 530.81 Acute respiratory failure (HCC) ICD-10-CM: J96.00 
ICD-9-CM: 518.81   
   
 UTI (urinary tract infection) ICD-10-CM: N39.0 ICD-9-CM: 599.0 Pneumonia ICD-10-CM: J18.9 ICD-9-CM: 458  SOB (shortness of breath) ICD-10-CM: R06.02 
ICD-9-CM: 786.05   
   
 CKD (chronic kidney disease) ICD-10-CM: N18.9 ICD-9-CM: 161. 9 Anticoagulated ICD-10-CM: Z79.01 
ICD-9-CM: V58.61 PARAG (acute kidney injury) ([facility] ICD-10-CM: N17.9 ICD-9-CM: 698. 9 Fluid overload ICD-10-CM: E87.70 ICD-9-CM: 276.69 Subtherapeutic international normalized ratio (INR) ICD-10-CM: R79.1 ICD-9-CM: 790.92 Suspected COVID-19 virus infection ICD-10-CM: S96.780 ICD-9-CM: V01.79 Chest pain ICD-10-CM: R07.9 ICD-9-CM: 786.50 Left below-knee amputee [facility]) ICD-10-CM: U46.944 ICD-9-CM: V49.75   
   
 H/O bilateral mastectomy ICD-10-CM: Z90.13 ICD-9-CM: V45.71 Foot deformity, right ICD-10-CM: M21.961 ICD-9-CM: 736.70 Pes cavus ICD-10-CM: Q66.70 ICD-9-CM: 736.73 Diabetic polyneuropathy associated with type 2 diabetes mellitus ([facility] ICD-10-CM: E11.42 
ICD-9-CM: 250.60, 357.2 Morbid obesity with BMI of 40.0-44.9, adult (HCC) ICD-10-CM: E66.01, Z68.41 
ICD-9-CM: 278.01, V85.41 Controlled type 2 diabetes mellitus with stage 4 chronic kidney disease, with long-term current use of insulin (HCC) ICD-10-CM: E11.22, N18.4, Z79.4 ICD-9-CM: 250.40, 585.4, V58.67 PAF (paroxysmal atrial fibrillation) (HCC) ICD-10-CM: I48.0 ICD-9-CM: 427.31 Anemia in stage 4 chronic kidney disease (HCC) ICD-10-CM: N18.4, D63.1 ICD-9-CM: 285.21, 585.4 Essential hypertension ICD-10-CM: I10 
ICD-9-CM: 401.9 Systolic murmur CXA-64-UI: R01.1 ICD-9-CM: 785.2 CKD (chronic kidney disease), stage IV ([facility] [address] ICD-10-CM: N18.4 ICD-9-CM: 585.4 Overview Addendum 3/12/2013  8:11 AM by Rachel Lund. Acute on chronic, Dr. Alexander Mace Mixed hyperlipidemia ICD-10-CM: E78.2 ICD-9-CM: 272.2 Long term (current) use of anticoagulants ICD-10-CM: Z79.01 
ICD-9-CM: V58.61 S/P cardiac pacemaker procedure ICD-10-CM: Z95.0 ICD-9-CM: V45.01  Overview Addendum 6/12/2020  2:12 PM by Cecil Lipscomb MD  
  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 Sick sinus syndrome (HCC) (Chronic) ICD-10-CM: I49.5 ICD-9-CM: 427.81 Overview Signed 4/2/2012  1:37 PM by Adriane Pastrana NP With 5 second and greater pauses, dual chamber pacemaker placement and loop recorder explant done>medtronic device Status post ablation of atrial fibrillation (Chronic) ICD-10-CM: Z98.890, Z86.79 
ICD-9-CM: V45.89 Overview Signed 12/15/2011  9:23 AM by Juan Alvarenga NP  
  12/14/11 PVI ablation of atrial fibrillation Fe deficiency anemia ICD-10-CM: D50.9 ICD-9-CM: 280.9 Overview Signed 4/23/2010 12:50 PM by Richelle Doran. EGD and Colonoscopy neg latter repeat in 10 years History of breast cancer ICD-10-CM: Z85.3 ICD-9-CM: V10.3 Overview Addendum 6/11/2018  4:11 PM by Cecil Lipscomb MD  
  1998, right modified radical mastectomy  0/11 nodes, + est and pro receptors. chemotherapy x 4 `1999 2008 left cancer with mastectomy and bilateral reconstruction using own tissues. Asthma ICD-10-CM: M09.576 ICD-9-CM: 493.90 Overview Signed 4/13/2010  9:20 AM by Richelle Doran.  
  infectious Past Medical History:  
 
 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 She also has no past medical history of Adverse effect of anesthesia, Difficult intubation, Malignant hyperthermia due to anesthesia, or Pseudocholinesterase deficiency. Past Surgical History:  
 
 has a past surgical history that includes pr laminectomy,cervical (1994); pr gastric bypass,obese<150cm emma-en-y (7/08); pr anesth,knee area surgery (1982 AND 1994); hx cervical fusion (1994); hx dilation and curettage (1992); hx carpal tunnel release; hx mastectomy (1998); ir insert tunl cvc w port over 5 years; pr trabeculoplasty by laser surgery; pr needle biopsy liver,w othr proc (8.21.07); us guide asp ovarian cyst abd approach (8.21.07); hx afib ablation; hx pacemaker (11/17/2020); hx mohs procedure (1995); pr abdomen surgery proc unlisted; hx orthopaedic (Right); hx mastectomy (Left); hx pacemaker; hx breast reconstruction (Bilateral); pr relocation of skin pocket for pacemaker (N/A, 4/24/2020); pr rmvl transvns pm eltrd 1 lead sys atr/ventr (N/A, 4/24/2020); pr rmvl transvns pm eltrd 1 lead sys atr/ventr (N/A, 4/27/2020); ir insert tunl cvc w/o port over 5 yr (5/4/2020); ir remove tunl cvad w/o port / pump (6/26/2020); ir insert tunl cvc w/o port over 5 yr (9/8/2020); hx amputation foot (Left, 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 Home Medications:  
 
Prior to Admission medications Medication Sig Start Date End Date Taking? Authorizing Provider QUEtiapine (SEROqueL) 25 mg tablet Take 1 Tab by mouth two (2) times a day for 30 days.  Indications: additional medications to treat depression 3/11/21 4/10/21 Yes Bry Maher MD  
cholecalciferol (Vitamin D3) (1000 Units /25 mcg) tablet Take 1,000 Units by mouth daily. Yes Provider, Historical  
vitamin a-vitamin c-vit e-min (OCUVITE) tablet Take 1 Tab by mouth daily. Yes Provider, Historical  
cyanocobalamin (Vitamin B-12) 500 mcg tablet Take 500 mcg by mouth daily. Yes Provider, Historical  
pantoprazole (PROTONIX) 40 mg tablet Take 1 Tab by mouth Before breakfast and dinner. 12/7/20  Yes Naz Obando MD  
sertraline (ZOLOFT) 100 mg tablet Take 100 mg by mouth daily. 10/30/20  Yes Provider, Historical  
sertraline (ZOLOFT) 25 mg tablet Take 25 mg by mouth daily. Take with 100 mg tablet every morning 10/6/20  Yes Provider, Historical  
busPIRone (BUSPAR) 10 mg tablet TAKE ONE TABLET BY MOUTH TWICE A DAY 5/24/19   Mick Tineo MD  
 
 
Allergies/Social/Family History: Allergies Allergen Reactions  Avapro [Irbesartan] Unable to Obtain  Bactrim [Sulfamethoxazole-Trimethoprim] Other (comments) Sore mouth  Contrast Agent [Iodine] Itching [address]  Morphine Nausea and Vomiting and Swelling  Penicillins Hives Did not tolerate cefepime 3/2020  Pravachol [Pravastatin] Nausea Only  Seafood [Shellfish Containing Products] Hives  Singulair [Montelukast] Other (comments)  
  palpitations  Ace Inhibitors Cough  Amlodipine Swelling  Captopril Cough  Carvedilol Diarrhea Social History Tobacco Use  Smoking status: Never Smoker  Smokeless tobacco: Never Used Substance Use Topics  Alcohol use: Not Currently Family History Problem Relation Age of Onset  Cancer Mother  Heart Disease Mother  Alcohol abuse Father  Diabetes Brother  Hypertension Brother Review of Systems:  
 
Pertinent items are noted in HPI. Objective:  
Vital Signs: 
Visit Vitals BP (!) 109/52 Pulse 91 Temp 99.3 °F (37.4 °C) Resp 15 Ht 5' 10\" (1.778 m) Wt 116.5 kg (256 lb 13.4 oz) SpO2 98% BMI 36.85 kg/m²  O2 Flow Rate (L/min): 2 l/min O2 Device: Room air Temp (24hrs), Av.8 °F (36.6 °C), Min:94 °F (34.4 °C), Max:99.3 °F (37.4 °C) CVP (mmHg): 30 mmHg (21 1730) Intake/Output:  
 
Intake/Output Summary (Last 24 hours) at 2021 1813 Last data filed at 2021 1700 Gross per 24 hour Intake 396.52 ml Output 491 ml Net -94.48 ml Physical Exam: 
 
General: NAD, + F/C, irritable Eye:  PERRLA Neurologic: Alert and oriented x3 Neck:  Supple, no JVD Lungs:  CTAB Heart:  RRR, + murmur, 2+ pulses Abdomen: soft, nontender, nondistended Ext: L BKA 
 
LABS AND  DATA: Personally reviewed Recent Labs 21 
7036 21 
0103 WBC 5.2 3.9 HGB 12.1 11.5 HCT 40.2 38.2  252 Recent Labs 21 
1132 21 
3546 21 
0103 21 
0103 * 131*   < > 129*  
K 4.5 5.3*   < > 4.7  103   < > 104 CO2 20* 17*   < > 15* BUN 42* 44*   < > 37* CREA 3.03* 3.12*   < > 2.75* * 210*   < > 248* CA 8.9 9.2   < > 9.1 MG  --  2.5*  --  2.3 PHOS  --  5.4*  --  4.9*  
 < > = values in this interval not displayed. Recent Labs 21 
0558 21 
0230 AP 87 96  
TP 6.8 6.4 ALB 2.9* 2.8*  
GLOB 3.9 3.6 Recent Labs 21 
0103 21 
1737 APTT 67.4* 85.1* No results for input(s): PHI, PCO2I, PO2I, FIO2I in the last 72 hours. Recent Labs 21 
0230 TROIQ 28.50* RA 
 
MEDS: Reviewed Chest X-Ray: personally reviewed and report checked- No evidence of acute cardiopulmonary process. · LV: Estimated LVEF is 20 - 25%. Normal cavity size. Moderate concentric hypertrophy. Severely reduced systolic function. · RV: Dilated right ventricle. Mildly reduced systolic function. Pacing wire present · MV: Mitral valve non-specific thickening. Mild mitral annular calcification. Moderate to severe mitral valve regurgitation is present. · TV: Mild tricuspid valve regurgitation is present. · PA: Moderate pulmonary hypertension.  Pulmonary arterial systolic pressure is 47 mmHg. · RA: Mildly dilated right atrium. Assessment and Plan:  
 
NSTEMI Severe ischemic cardiomyopathy Severe 3 vessel disease Paroxysmal AF PARAG on CKD4 Cardiorenal syndrome Elevated LFTs - likely hepatic congestion DM2: Lantus; Lispro w meals; SSI HLD: Holding statin due to transaminitis Anxiety: Zoloft Deconditioning: PT/OT when appropriate; c/b hx of BKA w poorly fitting prosthesis Her prognosis is very guarded. She is deemed a very poor candidate for CABG. Hemodynamic mgmt per cardiology directed by PA cath. Planned PCI next week. Nephrology following CRITICAL CARE CONSULTANT NOTE I had a face to face encounter with the patient, reviewed and interpreted patient data including clinical events, labs, images, vital signs, I/O's, and examined patient. I have discussed the case and the plan and management of the patient's care with the consulting services, the bedside nurses and the respiratory therapist.   
 
 
Gilford Senate, MD 
[phone] 
4/9/2021

## 2021-04-09 NOTE — PROGRESS NOTES
[facility] 
 
ICU TEAM Progress Note Name: Toni Perez : 1960 MRN: [id number] Date: 2021 Subjective:  
Progress Note: 2021 Ms Remberto Thomas is a 62 yo female with a PMH of mitral regurgitation, CAD, HTN, HLD, paroxysmal atrial fibrillation/atrial flutter s/p AV node ablation, sick sinus syndrome s/p pacemaker, AV endocarditis, DM2, CKD4 (baseline Cr 2-2.2), neuropathy, L BKA, GERD, anemia, lymphedema, and gastric emma-en-y. As per chart review, she presented to [facility] as a transfer from [facility] on . At the OSH, she had been treated for chest pain, NSTEMI, and HF. Troponin peaked at 55 and downtrended to 28. TTE from [facility] showed EF 10-15% (prior normal EF). Repeat TTE pending at [facility]. At [facility], she has been seen an evaluated by Dr Gunner Vora. Of note, patient has allergies to contrast (noted in allergy list). POD:Day of Surgery S/P: Procedure(s): LEFT AND RIGHT HEART CATH / CORONARY ANGIOGRAPHY Aortagram Abdominal W Runoff Active Problem List:  
 
Problem List  Date Reviewed: 3/1/2021 Codes Class S/P atrioventricular rolando ablation ICD-10-CM: D79.762 ICD-9-CM: V45.89 S/P cardiac cath ICD-10-CM: Z98.890 ICD-9-CM: V45.89 Overview Signed 2021  1:01 PM by Brandy Alba NP  
  2021: cardiac cath showed MVD. Mod/severe MR, elevated PA pressures CHF (congestive heart failure) (HCC) ICD-10-CM: I50.9 ICD-9-CM: 428.0   
   
 NSTEMI (non-ST elevated myocardial infarction) ([facility] ICD-10-CM: I21.4 ICD-9-CM: 410.70 Gastroesophageal reflux disease without esophagitis ICD-10-CM: K21.9 ICD-9-CM: 530.81 Acute respiratory failure (HCC) ICD-10-CM: J96.00 
ICD-9-CM: 518.81   
   
 UTI (urinary tract infection) ICD-10-CM: N39.0 ICD-9-CM: 599.0 Pneumonia ICD-10-CM: J18.9 ICD-9-CM: 773  SOB (shortness of breath) ICD-10-CM: R06.02 
ICD-9-CM: 786.05   
   
 CKD (chronic kidney disease) ICD-10-CM: N18.9 ICD-9-CM: 146. 9 Anticoagulated ICD-10-CM: Z79.01 
ICD-9-CM: V58.61 PARAG (acute kidney injury) ([facility] ICD-10-CM: N17.9 ICD-9-CM: 776. 9 Fluid overload ICD-10-CM: E87.70 ICD-9-CM: 276.69 Subtherapeutic international normalized ratio (INR) ICD-10-CM: R79.1 ICD-9-CM: 790.92 Suspected COVID-19 virus infection ICD-10-CM: S29.488 ICD-9-CM: V01.79 Chest pain ICD-10-CM: R07.9 ICD-9-CM: 786.50 Left below-knee amputee [facility]) ICD-10-CM: S21.612 ICD-9-CM: V49.75   
   
 H/O bilateral mastectomy ICD-10-CM: Z90.13 ICD-9-CM: V45.71 Foot deformity, right ICD-10-CM: M21.961 ICD-9-CM: 736.70 Pes cavus ICD-10-CM: Q66.70 ICD-9-CM: 736.73 Diabetic polyneuropathy associated with type 2 diabetes mellitus ([facility] ICD-10-CM: E11.42 
ICD-9-CM: 250.60, 357.2 Morbid obesity with BMI of 40.0-44.9, adult (HCC) ICD-10-CM: E66.01, Z68.41 
ICD-9-CM: 278.01, V85.41 Controlled type 2 diabetes mellitus with stage 4 chronic kidney disease, with long-term current use of insulin (HCC) ICD-10-CM: E11.22, N18.4, Z79.4 ICD-9-CM: 250.40, 585.4, V58.67 PAF (paroxysmal atrial fibrillation) (HCC) ICD-10-CM: I48.0 ICD-9-CM: 427.31 Anemia in stage 4 chronic kidney disease (HCC) ICD-10-CM: N18.4, D63.1 ICD-9-CM: 285.21, 585.4 Essential hypertension ICD-10-CM: I10 
ICD-9-CM: 401.9 Systolic murmur MLX-23-FX: R01.1 ICD-9-CM: 785.2 CKD (chronic kidney disease), stage IV ([facility] [address] ICD-10-CM: N18.4 ICD-9-CM: 585.4 Overview Addendum 3/12/2013  8:11 AM by Shanice Rome. Acute on chronic, Dr. Madalyn Yap Mixed hyperlipidemia ICD-10-CM: E78.2 ICD-9-CM: 272.2 Long term (current) use of anticoagulants ICD-10-CM: Z79.01 
ICD-9-CM: V58.61 S/P cardiac pacemaker procedure ICD-10-CM: Z95.0 ICD-9-CM: V45.01  Overview Addendum 6/12/2020  2:12 PM by Dolores Odom MD  
  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 Sick sinus syndrome (HCC) (Chronic) ICD-10-CM: I49.5 ICD-9-CM: 427.81 Overview Signed 4/2/2012  1:37 PM by Renae Lennox, NP With 5 second and greater pauses, dual chamber pacemaker placement and loop recorder explant done>medtronic device Status post ablation of atrial fibrillation (Chronic) ICD-10-CM: Z98.890, Z86.79 
ICD-9-CM: V45.89 Overview Signed 12/15/2011  9:23 AM by Delfino Dc NP  
  12/14/11 PVI ablation of atrial fibrillation Fe deficiency anemia ICD-10-CM: D50.9 ICD-9-CM: 280.9 Overview Signed 4/23/2010 12:50 PM by Nupur Cantrell. EGD and Colonoscopy neg latter repeat in 10 years History of breast cancer ICD-10-CM: Z85.3 ICD-9-CM: V10.3 Overview Addendum 6/11/2018  4:11 PM by Dolores Odom MD  
  1998, right modified radical mastectomy  0/11 nodes, + est and pro receptors. chemotherapy x 4 `1999 2008 left cancer with mastectomy and bilateral reconstruction using own tissues. Asthma ICD-10-CM: M74.238 ICD-9-CM: 493.90 Overview Signed 4/13/2010  9:20 AM by Nupur Cantrell.  
  infectious Past Medical History:  
 
 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 She also has no past medical history of Adverse effect of anesthesia, Difficult intubation, Malignant hyperthermia due to anesthesia, or Pseudocholinesterase deficiency. Past Surgical History:  
 
 has a past surgical history that includes pr laminectomy,cervical (1994); pr gastric bypass,obese<150cm emma-en-y (7/08); pr anesth,knee area surgery (1982 AND 1994); hx cervical fusion (1994); hx dilation and curettage (1992); hx carpal tunnel release; hx mastectomy (1998); ir insert tunl cvc w port over 5 years; pr trabeculoplasty by laser surgery; pr needle biopsy liver,w othr proc (8.21.07); us guide asp ovarian cyst abd approach (8.21.07); hx afib ablation; hx pacemaker (11/17/2020); hx mohs procedure (1995); pr abdomen surgery proc unlisted; hx orthopaedic (Right); hx mastectomy (Left); hx pacemaker; hx breast reconstruction (Bilateral); pr relocation of skin pocket for pacemaker (N/A, 4/24/2020); pr rmvl transvns pm eltrd 1 lead sys atr/ventr (N/A, 4/24/2020); pr rmvl transvns pm eltrd 1 lead sys atr/ventr (N/A, 4/27/2020); ir insert tunl cvc w/o port over 5 yr (5/4/2020); ir remove tunl cvad w/o port / pump (6/26/2020); ir insert tunl cvc w/o port over 5 yr (9/8/2020); hx amputation foot (Left, 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 Home Medications:  
 
Prior to Admission medications Medication Sig Start Date End Date Taking? Authorizing Provider QUEtiapine (SEROqueL) 25 mg tablet Take 1 Tab by mouth two (2) times a day for 30 days.  Indications: additional medications to treat depression 3/11/21 4/10/21 Yes Ciarra Mai MD  
apixaban (ELIQUIS) 5 mg tablet Take 1 Tab by mouth every twelve (12) hours for 30 days. 3/8/21 4/7/21 Yes Farideh Lantigua MD  
carvediloL (COREG) 12.5 mg tablet Take 1 Tab by mouth two (2) times daily (with meals) for 30 days. 3/8/21 4/7/21 Yes Farideh Lantigua MD  
bumetanide (BUMEX) 1 mg tablet Take 1 Tab by mouth two (2) times a day for 30 days. 3/8/21 4/7/21 Yes Farideh Lantigua MD  
cholecalciferol (Vitamin D3) (1000 Units /25 mcg) tablet Take 1,000 Units by mouth daily. Yes Provider, Historical  
vitamin a-vitamin c-vit e-min (OCUVITE) tablet Take 1 Tab by mouth daily. Yes Provider, Historical  
cyanocobalamin (Vitamin B-12) 500 mcg tablet Take 500 mcg by mouth daily. Yes Provider, Historical  
pantoprazole (PROTONIX) 40 mg tablet Take 1 Tab by mouth Before breakfast and dinner. 12/7/20  Yes Bjorn Meckel, MD  
sertraline (ZOLOFT) 100 mg tablet Take 100 mg by mouth daily. 10/30/20  Yes Provider, Historical  
sertraline (ZOLOFT) 25 mg tablet Take 25 mg by mouth daily. Take with 100 mg tablet every morning 10/6/20  Yes Provider, Historical  
busPIRone (BUSPAR) 10 mg tablet TAKE ONE TABLET BY MOUTH TWICE A DAY 5/24/19   Elizabeth Luevano MD  
 
 
Allergies/Social/Family History: Allergies Allergen Reactions  Avapro [Irbesartan] Unable to Obtain  Bactrim [Sulfamethoxazole-Trimethoprim] Other (comments) Sore mouth  Contrast Agent [Iodine] Itching [address]  Morphine Nausea and Vomiting and Swelling  Penicillins Hives Did not tolerate cefepime 3/2020  Pravachol [Pravastatin] Nausea Only  Seafood [Shellfish Containing Products] Hives  Singulair [Montelukast] Other (comments)  
  palpitations  Ace Inhibitors Cough  Amlodipine Swelling  Captopril Cough  Carvedilol Diarrhea Social History Tobacco Use  Smoking status: Never Smoker  Smokeless tobacco: Never Used Substance Use Topics  Alcohol use: Not Currently Family History Problem Relation Age of Onset  Cancer Mother  Heart Disease Mother  Alcohol abuse Father  Diabetes Brother  Hypertension Brother Review of Systems:  
 
Pertinent items are noted in HPI. Objective:  
Vital Signs: 
Visit Vitals BP 99/68 (BP 1 Location: Left upper arm, BP Patient Position: At rest) Pulse 90 Temp 97 °F (36.1 °C) Resp 19 Ht 5' 10\" (1.778 m) Wt 116.5 kg (256 lb 13.4 oz) SpO2 99% BMI 36.85 kg/m² O2 Flow Rate (L/min): 2 l/min O2 Device: Room air Temp (24hrs), Av.2 °F (36.2 °C), Min:97 °F (36.1 °C), Max:97.5 °F (36.4 °C) Intake/Output:  
 
Intake/Output Summary (Last 24 hours) at 2021 Last data filed at 2021 Gross per 24 hour Intake 1310 ml Output 651 ml Net 659 ml Physical Exam: 
 
General:  alert, cooperative, no distress, appears stated age Eye:  PERRLA Neurologic: Alert and oriented x3 Neck:  Supple, no JVD Lungs:  CTAB Heart:  RRR, + murmur, 2+ pulses Abdomen: soft, nontender, nondistended Skin: c/d/i 
 
LABS AND  DATA: Personally reviewed Recent Labs 21 
0230 WBC 3.9 5.3 HGB 11.5 11.6 HCT 38.2 39.6  281 Recent Labs 21 
1600 21 
0103 * 129*  
K 4.8 4.7  104 CO2 16* 15* BUN 40* 37* CREA 2.87* 2.75* * 248* CA 8.8 9.1 MG  --  2.3 PHOS  --  4.9* Recent Labs 21 
0230 AP 96  
TP 6.4 ALB 2.8*  
GLOB 3.6 Recent Labs 21 
0103 21 
1737 APTT 67.4* 85.1* No results for input(s): PHI, PCO2I, PO2I, FIO2I in the last 72 hours. Recent Labs 21 
0230 TROIQ 28.50* RA 
 
MEDS: Reviewed Chest X-Ray: personally reviewed and report checked- No evidence of acute cardiopulmonary process. · LV: Estimated LVEF is 20 - 25%. Normal cavity size. Moderate concentric hypertrophy. Severely reduced systolic function. · RV: Dilated right ventricle. Mildly reduced systolic function. Pacing wire present · MV: Mitral valve non-specific thickening. Mild mitral annular calcification. Moderate to severe mitral valve regurgitation is present. · TV: Mild tricuspid valve regurgitation is present. · PA: Moderate pulmonary hypertension. Pulmonary arterial systolic pressure is 47 mmHg. · RA: Mildly dilated right atrium. Assessment and Plan:  
 
NSTEMI: Transferred from [facility] with NSTEMI in setting of CAD with TTE showing newly decreased EF 20-25% with prior baseline normal with moderate to severe mitral regurgitation. Cardiac surgery has been consulted for possible intervention. Anesthesia consulted to place [facility]. Spoke with anesthesia this evening. 
- [place] planned for 4/9 
- MIKE planned for 4/9 
- Determination of further interventions to be made after above [place] and MIKE data obtained - Appreciate cardiac surgery, cardiology, and anesthesia Acute on chronic systolic HF: TTE from [facility] showed EF of 10-15%, which is in contrast to prior TTE , which showed normal EF. Repeat TTE 4/7 showed EF 20-25% with moderate to severe mitral regurgitation. 
- Continue cardiology recs as follows: 
- Continue Bumex 1mg PO BID 
- Continue coreg 12.5mg BID 
- Continue Aspirin PARAG on CKD4: Baseline Cr 2-2.1: Concern for cardiorenal component. Possibility of NOE.  
- Continue bumex - Follow up renal ultrasound - Renal following appreciate recs Paroxysmal atrial fibrillation: In a paced rhythm s/p ablation. On eliquis PTA. 
- Coreg 
- Holding eliquis in case of possible intervention Transaminitis: Likely due to hepatic congestion in the setting of HF exacerbation.  
- Daily hepatic panel DM2: Lantus; Lispro w meals; SSI HLD: Holding statin due to transaminitis Anxiety: Zoloft Deconditioning: PT/OT when appropriate; c/b hx of BKA w poorly fitting prosthesis CRITICAL CARE CONSULTANT NOTE I had a face to face encounter with the patient, reviewed and interpreted patient data including clinical events, labs, images, vital signs, I/O's, and examined patient. I have discussed the case and the plan and management of the patient's care with the consulting services, the bedside nurses and the respiratory therapist.   
 
Level 3 Nicci Marroquin NP [facility] [place] Critical Care 4/8/2021

## 2021-04-09 NOTE — PROGRESS NOTES
1900.  Received report from Gil Vincent. Included was SBAR, Kardex, ITRACE, LDA's, and MAR and events from previous shift and plans for this evening. Bedside skin assessment completed. 1925. Pt resting with eyes closed. Introduced self, pt has no complaints at this time. Visitor at bedside with eyes closed. 1935. Pt called out on callbell, upon entering room, she is moaning and complaining of severe pain. When asked where the pain is, she states \"my wrists, my neck. I can't move. \"  Repositioned and provided prn fentanyl prn, which she stated helped. 
2220. Called out, states she is in pain. Is moaning, states wrists and neck hurt. Fentanyl prn dose given and pt repositioned. 
2330. Pt resting quietly without complaints. Assessment unchanged. 425. Spoke with dr Gibran Hubbard. He is ok with sbp >100 and cardiac index 1.5. Notified him pt has nonspecific pain complaints but is grey in color. Will decrease dobut as tolerated to keep pt within parameters.

## 2021-04-09 NOTE — CONSULTS
[facility] Name:  Huy Lozano 
MR#:  [id number] :  1960 ACCOUNT #:  [de-identified] DATE OF SERVICE:  2021 HISTORY OF PRESENT ILLNESS:  The patient is a 54-year-old woman, who is referred for coronary artery bypass grafting by Dr. Madai Nix. She also has moderate-to-severe mitral regurgitation. She presented with chest pain and shortness of breath at rest.  She was found to have an NSTEMI on admission. She was transferred to [facility]. She has a pertinent medical history of hypertension, hyperlipidemia, paroxysmal AFib, implantation of a permanent pacemaker, prior endocarditis, diabetes mellitus type 2, CKD stage IV, of note a left BKA, morbid obesity, anemia, lymphedema, and childhood asthma. She has a surgical history of a bilateral mastectomy, left BKA, and a gastric bypass. She does live with her . She appears older than stated age of 63. She normally uses the prosthesis to get around, but it does not fit at this time due to her swelling. CARDIAC TESTING:  Cardiac catheterization shows three-vessel disease, final report pending. TTE, 2021, ejection fraction 20%-25%, severely reduced systolic function, unable to assess diastolic dysfunction, severe hypokinesis of all the left ventricular walls, dilated right ventricle with mildly reduced systolic function. Of note, her mitral valve has moderate-to-severe mitral regurgitation, mild mitral annular calcification. Tricuspid valve shows mild regurgitation. PA systolic pressure is 47 mmHg.  
 
PAST MEDICAL HISTORY:  Acquired lymphedema in the right arm, acute respiratory failure in , arrhythmia, asthma, history of atrial fibrillation, cancer in bilateral breasts, chronic kidney disease stage IV, diabetes mellitus type 2, dizziness, essential hypertension, hyperlipidemia, morbid obesity, nausea and vomiting, neuropathy, osteoarthritis, and sick sinus syndrome. MEDICATIONS PRIOR TO ADMISSION:  Seroquel 25 mg, Eliquis 5 mg, carvedilol 12.5 mg, Bumex 1 mg, vitamin D3, Protonix 40 mg, sertraline 100 mg, buspirone 10 mg. ALLERGIES:  INCLUDE AVAPRO, UNABLE TO OBTAIN REACTION. BACTRIM WITH SORE MOUTH AS A REACTION. IODINE WITH ITCHING REACTION. FISH CONTAINING PRODUCTS WITH HIVES REACTION. MORPHINE WITH NAUSEA, VOMITING, AND SWELLING REACTION. PENICILLIN GETS HIVES AS REACTION. PRAVASTATIN, NAUSEA REACTION. SEAFOOD, HIVES REACTION. MONTELUKAST, PALPITATIONS REACTION. ACE INHIBITORS, COUGH REACTION. AMLODIPINE, SWELLING. CAPTOPRIL, COUGH. CARVEDILOL, DIARRHEA. PAST SURGICAL HISTORY:  History of AFib ablation, history of amputation of her left foot, history of breast reconstruction, history of carpal tunnel release, history of  fusion, Mohs procedure on the right in 1995, history of pacemaker insertion in 2020, and gastric bypass in 2020. SOCIAL HISTORY:  Smoking history, she is never smoker. Alcohol use is none currently. FAMILY HISTORY:  Cancer in her mother. Heart disease in her mother. Alcohol abuse in her father. Diabetes and hypertension in her brothers. REVIEW OF SYMPTOMS:  Pertinent positives are noted in the HPI. PHYSICAL EXAMINATION: 
GENERAL:  She is able to converse, but lethargic. VITAL SIGNS:  Blood pressure 106/73, pulse 91, temperature 97.3, respirations 18, height 5 feet 10 inches, weight 256 pounds, saturations 94%. BMI 37. LUNGS:  Clear to auscultation. HEART:  A 4/6 systolic ejection murmur. ABDOMEN:  Obese, but soft and nontender. EXTREMITIES:  Cool. She does have pallor. She has 2+ lower extremity pitting edema. NEUROLOGIC:  Grossly normal. 
 
LABORATORY DATA:  Significant lab findings include a sodium of 129, creatinine of 2.75, and glucose of 248. ASSESSMENT AND PLAN: 
1. Coronary artery disease/non-ST elevation myocardial infarction.   She is a particularly high risk for coronary artery bypass grafting on initial evaluation. I will discuss further with . Her risk of mortality according to STS was calculated as 8.5%. In addition, with her left lower limb amputation, she will have a very difficult time with recovery. She also has an extremely high risk for starting dialysis. At this point, I would recommend percutaneous coronary intervention, but I will discuss with Dr. Juanita Casillas. 2.  Acute-on-chronic systolic heart failure. She has New York Heart Association class IV on admission. She has an ejection fraction of 20% and shortness of breath with lower extremity edema, and her proBNP is greater than 35,000 on admission. She needs to be optimized in the intensive care unit with a [place] catheter and inotropic support as well as diuresis prior to any other intervention whether percutaneous coronary intervention or surgery. 3.  Moderate-to-severe mitral regurgitation. This is functional in nature and may improve with diuresis as well as revascularization. 4.  Atrial fibrillation/flutter, status post ablation. She is paced. Now, she has a permanent pacemaker. She is on Eliquis. 5.  Diabetes mellitus, type 2. She has an A1c of 6.7, controlled with diet at this point. 6.  Acute kidney injury on chronic kidney disease, stage IV. Renal has been consulted. If her creatinine is worsening, she may need dialysis this admission. 7.  Asthma. She had this since childhood. She is currently not being treated on any inhalers. In summary, the patient has numerous serious medical comorbidities in addition to her coronary artery disease that make an open cardiac surgery prohibited for her. She has extreme risk for not only death, but also prolonged ventilation, renal failure, and an extended hospital stay in the ICU.   For these reasons, I spoke with Dr. Juanita Casillas and relayed my concerns, and my recommendation would be for multivessel percutaneous coronary intervention with or without the use of an Impella to support her. The use of Impella may not be necessary if we are able to optimize her in the intensive care unit and resuscitate her. Tyshawn Gee MD 
 
 
[id number] 
D:  04/09/2021 11:56 
T:  04/09/2021 17:32 
JOB #:  [id number]

## 2021-04-10 NOTE — PROGRESS NOTES
Nephrology Progress Note Monroe Kiser  
 
www. [email or web address]  Phone - [phone] Patient: Reza South    YOB: 1960 Date- 4/10/2021   Admit Date: 4/7/2021 CC: Follow up for  PARAG on CKD stage 4 IMPRESSION & PLAN:  
PARAG stage 1 on CKD stage 4: 
-suspect CKD sec to DM2 and HTN and PARAG sec to CRS from suppressed EF from recent MI and NOE. -appears to be responding to inotropes 
-cont current care with dobutamine and bumex 
-can intensify diuretics if UOP declines 
-no urgent need for RRT at this time 
-daily labs and strict I/Os NSTEMI: 
-S/P LHC, per cath labs diffuse CAD 
-not a candidate for CABg 
-Plan for PCI next week. 
  
Systolic HF: 
- diuretics and inotropes as above 
  
DM2: 
-Per primary team 
  
HTN: 
- BP stable 
   
Subjective: Interval History:  
Seen and examined. Improvement in CI on dobutamine. Cr better, UOP 2 L in the past 2 hrs. C/o neck and back pain. Appears comfortable on NC O2. No cp or sob reported this AM. Objective:  
Vitals:  
 04/10/21 0500 04/10/21 0530 04/10/21 0557 04/10/21 0600 BP: 121/71 118/73  121/70 Pulse: 91 91 91 91 Resp: 18 16 16 15 Temp: 97.8 °F (36.6 °C) 97.8 °F (36.6 °C) 97.8 °F (36.6 °C) 97.8 °F (36.6 °C) SpO2: 100% 100% 100% 100% Weight:   112.5 kg (248 lb 0.3 oz) Height:      
  
04/09 0701 - 04/10 0700 In: 752.7 [P.O.:50; I.V.:702.7] Out: 2165 [Urine:2165] Last 3 Recorded Weights in this Encounter 04/07/21 1124 04/08/21 0300 04/10/21 0557 Weight: 111.1 kg (245 lb) 116.5 kg (256 lb 13.4 oz) 112.5 kg (248 lb 0.3 oz) Physical exam:  
GEN: NAD NECK- + swan, no JVD RESP: No wheezing, Clear b/l CVS: S1,S2  RRR 
NEURO: Normal speech, Non focal 
EXT: trace LE edema PSYCH: Normal Mood : buckley in place Chart reviewed. Pertinent Notes reviewed. Data Review : 
Recent Labs 04/10/21 
4723 04/09/21 
1132 04/09/21 
7058 04/08/21 
0103 04/08/21 
0103 NA 134* 132* 131*   < > 129*  
K 4.3 4.5 5.3*   < > 4.7  103 103   < > 104 CO2 20* 20* 17*   < > 15* BUN 45* 42* 44*   < > 37* CREA 2.94* 3.03* 3.12*   < > 2.75* * 170* 210*   < > 248* CA 8.1* 8.9 9.2   < > 9.1 MG 2.3  --  2.5*  --  2.3 PHOS 4.3  --  5.4*  --  4.9*  
 < > = values in this interval not displayed. Recent Labs 04/10/21 
4075 04/09/21 
4874 04/08/21 
0103 WBC 5.6 5.2 3.9 HGB 11.0* 12.1 11.5 HCT 36.0 40.2 38.2  169 252 No results for input(s): FE, TIBC, PSAT, FERR in the last 72 hours. Medication list  reviewed Current Facility-Administered Medications Medication Dose Route Frequency  [Held by provider] apixaban (ELIQUIS) tablet 5 mg  5 mg Oral Q12H  pantoprazole (PROTONIX) tablet 40 mg  40 mg Oral ACB&D  DOBUTamine (DOBUTREX) 500 mg/250 mL (2,000 mcg/mL) infusion  0-10 mcg/kg/min IntraVENous TITRATE  ticagrelor (BRILINTA) tablet 90 mg  90 mg Oral Q12H  
 [Held by provider] carvediloL (COREG) tablet 3.125 mg  3.125 mg Oral BID WITH MEALS  insulin glargine (LANTUS) injection 10 Units  10 Units SubCUTAneous QHS  insulin lispro (HUMALOG) injection 5 Units  5 Units SubCUTAneous TIDAC  alcohol 62% (NOZIN) nasal  1 Ampule  1 Ampule Topical Q12H  
 bumetanide (BUMEX) tablet 1 mg  1 mg Oral BID  nitroglycerin (NITROSTAT) tablet 0.4 mg  0.4 mg SubLINGual PRN  
 sodium chloride (NS) flush 5-40 mL  5-40 mL IntraVENous Q8H  
 sodium chloride (NS) flush 5-40 mL  5-40 mL IntraVENous PRN  
 acetaminophen (TYLENOL) tablet 650 mg  650 mg Oral Q6H PRN Or  
 acetaminophen (TYLENOL) suppository 650 mg  650 mg Rectal Q6H PRN  polyethylene glycol (MIRALAX) packet 17 g  17 g Oral DAILY PRN  promethazine (PHENERGAN) tablet 12.5 mg  12.5 mg Oral Q6H PRN  Or  
 ondansetron (ZOFRAN) injection 4 mg  4 mg IntraVENous Q6H PRN  
 insulin lispro (HUMALOG) injection   SubCUTAneous AC&HS  
 glucose chewable tablet 16 g  4 Tab Oral PRN  
 dextrose (D50W) injection syrg 12.5-25 g  12.5-25 g IntraVENous PRN  
 glucagon (GLUCAGEN) injection 1 mg  1 mg IntraMUSCular PRN  
 aspirin delayed-release tablet 81 mg  81 mg Oral DAILY  fentaNYL citrate (PF) injection 25 mcg  25 mcg IntraVENous Q2H PRN Deloris Bernheim, MD             
[facility] / [place] [address], [address] [place], [address] Phone - [phone]               Fax - [phone]

## 2021-04-10 NOTE — PROGRESS NOTES
SVO2 recalibrated. Old reading 80  New reading 71. Remains very hard to flush. Calibration states \"artifact:  When trying to recalibrate. Will change positions and attempt to flush again

## 2021-04-10 NOTE — PROGRESS NOTES
[facility] 
 
ICU TEAM Progress Note Name: Brian Juarez : 1960 MRN: [id number] Date: 4/10/2021 Subjective:  
Progress Note: 4/10/2021 Ms Vernell Souza is a 64 yo female with a PMH of mitral regurgitation, CAD, HTN, HLD, paroxysmal atrial fibrillation/atrial flutter s/p AV node ablation, sick sinus syndrome s/p pacemaker, AV endocarditis, DM2, CKD4 (baseline Cr 2-2.2), neuropathy, L BKA, GERD, anemia, lymphedema, and gastric emma-en-y. As per chart review, she presented to [facility] as a transfer from [facility] on . At the OSH, she had been treated for chest pain, NSTEMI, and HF. Troponin peaked at 55 and downtrended to 28. TTE from [facility] showed EF 10-15% (prior normal EF). Repeat TTE pending at [facility]. At [facility], she has been seen an evaluated by Dr Demarco Edwards. Of note, patient has allergies to contrast (noted in allergy list). S/P: Procedure(s): LEFT AND RIGHT HEART CATH / CORONARY ANGIOGRAPHY Aortagram Abdominal W Runoff Active Problem List:  
 
Problem List  Date Reviewed: 3/1/2021 Codes Class S/P atrioventricular rolando ablation ICD-10-CM: L35.251 ICD-9-CM: V45.89 S/P cardiac cath ICD-10-CM: Z98.890 ICD-9-CM: V45.89 Overview Signed 2021  1:01 PM by Judith Dobson NP  
  2021: cardiac cath showed MVD. Mod/severe MR, elevated PA pressures CHF (congestive heart failure) (HCC) ICD-10-CM: I50.9 ICD-9-CM: 428.0   
   
 NSTEMI (non-ST elevated myocardial infarction) ([facility] ICD-10-CM: I21.4 ICD-9-CM: 410.70 Gastroesophageal reflux disease without esophagitis ICD-10-CM: K21.9 ICD-9-CM: 530.81 Acute respiratory failure (HCC) ICD-10-CM: J96.00 
ICD-9-CM: 518.81   
   
 UTI (urinary tract infection) ICD-10-CM: N39.0 ICD-9-CM: 599.0 Pneumonia ICD-10-CM: J18.9 ICD-9-CM: 744  SOB (shortness of breath) ICD-10-CM: R06.02 
ICD-9-CM: 786.05   
   
 CKD (chronic kidney disease) ICD-10-CM: N18.9 ICD-9-CM: 473. 9 Anticoagulated ICD-10-CM: Z79.01 
ICD-9-CM: V58.61 PARAG (acute kidney injury) ([facility] ICD-10-CM: N17.9 ICD-9-CM: 192. 9 Fluid overload ICD-10-CM: E87.70 ICD-9-CM: 276.69 Subtherapeutic international normalized ratio (INR) ICD-10-CM: R79.1 ICD-9-CM: 790.92 Suspected COVID-19 virus infection ICD-10-CM: A28.185 ICD-9-CM: V01.79 Chest pain ICD-10-CM: R07.9 ICD-9-CM: 786.50 Left below-knee amputee [facility]) ICD-10-CM: S38.868 ICD-9-CM: V49.75   
   
 H/O bilateral mastectomy ICD-10-CM: Z90.13 ICD-9-CM: V45.71 Foot deformity, right ICD-10-CM: M21.961 ICD-9-CM: 736.70 Pes cavus ICD-10-CM: Q66.70 ICD-9-CM: 736.73 Diabetic polyneuropathy associated with type 2 diabetes mellitus ([facility] ICD-10-CM: E11.42 
ICD-9-CM: 250.60, 357.2 Morbid obesity with BMI of 40.0-44.9, adult (HCC) ICD-10-CM: E66.01, Z68.41 
ICD-9-CM: 278.01, V85.41 Controlled type 2 diabetes mellitus with stage 4 chronic kidney disease, with long-term current use of insulin (HCC) ICD-10-CM: E11.22, N18.4, Z79.4 ICD-9-CM: 250.40, 585.4, V58.67 PAF (paroxysmal atrial fibrillation) (HCC) ICD-10-CM: I48.0 ICD-9-CM: 427.31 Anemia in stage 4 chronic kidney disease (HCC) ICD-10-CM: N18.4, D63.1 ICD-9-CM: 285.21, 585.4 Essential hypertension ICD-10-CM: I10 
ICD-9-CM: 401.9 Systolic murmur CMT-59-UN: R01.1 ICD-9-CM: 785.2 CKD (chronic kidney disease), stage IV ([facility] ICD-10-CM: N18.4 ICD-9-CM: 585.4 Overview Addendum 3/12/2013  8:11 AM by Chevy Trevino. Acute on chronic, Dr. Rivas Vizcarra Mixed hyperlipidemia ICD-10-CM: E78.2 ICD-9-CM: 272.2 Long term (current) use of anticoagulants ICD-10-CM: Z79.01 
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 Sick sinus syndrome (HCC) (Chronic) ICD-10-CM: I49.5 ICD-9-CM: 427.81 Overview Signed 4/2/2012  1:37 PM by Lorene Arreguin NP With 5 second and greater pauses, dual chamber pacemaker placement and loop recorder explant done>medtronic device Status post ablation of atrial fibrillation (Chronic) ICD-10-CM: Z98.890, Z86.79 
ICD-9-CM: V45.89 Overview Signed 12/15/2011  9:23 AM by Khoa Fenton NP  
  12/14/11 PVI ablation of atrial fibrillation Fe deficiency anemia ICD-10-CM: D50.9 ICD-9-CM: 280.9 Overview Signed 4/23/2010 12:50 PM by Yana Allison. EGD and Colonoscopy neg latter repeat in 10 years History of breast cancer ICD-10-CM: Z85.3 ICD-9-CM: V10.3 Overview Addendum 6/11/2018  4:11 PM by Betyt Roche MD  
  1998, right modified radical mastectomy  0/11 nodes, + est and pro receptors. chemotherapy x 4 `1999 2008 left cancer with mastectomy and bilateral reconstruction using own tissues. Asthma ICD-10-CM: N94.921 ICD-9-CM: 493.90 Overview Signed 4/13/2010  9:20 AM by Yana Allison.  
  infectious Past Medical History:  
 
 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 She also has no past medical history of Adverse effect of anesthesia, Difficult intubation, Malignant hyperthermia due to anesthesia, or Pseudocholinesterase deficiency. Past Surgical History:  
 
 has a past surgical history that includes pr laminectomy,cervical (1994); pr gastric bypass,obese<150cm emma-en-y (7/08); pr anesth,knee area surgery (1982 AND 1994); hx cervical fusion (1994); hx dilation and curettage (1992); hx carpal tunnel release; hx mastectomy (1998); ir insert tunl cvc w port over 5 years; pr trabeculoplasty by laser surgery; pr needle biopsy liver,w othr proc (8.21.07); us guide asp ovarian cyst abd approach (8.21.07); hx afib ablation; hx pacemaker (11/17/2020); hx mohs procedure (1995); pr abdomen surgery proc unlisted; hx orthopaedic (Right); hx mastectomy (Left); hx pacemaker; hx breast reconstruction (Bilateral); pr relocation of skin pocket for pacemaker (N/A, 4/24/2020); pr rmvl transvns pm eltrd 1 lead sys atr/ventr (N/A, 4/24/2020); pr rmvl transvns pm eltrd 1 lead sys atr/ventr (N/A, 4/27/2020); ir insert tunl cvc w/o port over 5 yr (5/4/2020); ir remove tunl cvad w/o port / pump (6/26/2020); ir insert tunl cvc w/o port over 5 yr (9/8/2020); hx amputation foot (Left, 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 Home Medications:  
 
Prior to Admission medications Medication Sig Start Date End Date Taking? Authorizing Provider QUEtiapine (SEROqueL) 25 mg tablet Take 1 Tab by mouth two (2) times a day for 30 days. Indications: additional medications to treat depression 3/11/21 4/10/21 Yes Camelia Amezcua MD  
cholecalciferol (Vitamin D3) (1000 Units /25 mcg) tablet Take 1,000 Units by mouth daily.    Yes Provider, Historical vitamin a-vitamin c-vit e-min (OCUVITE) tablet Take 1 Tab by mouth daily. Yes Provider, Historical  
cyanocobalamin (Vitamin B-12) 500 mcg tablet Take 500 mcg by mouth daily. Yes Provider, Historical  
pantoprazole (PROTONIX) 40 mg tablet Take 1 Tab by mouth Before breakfast and dinner. 20  Yes Felicia Roach MD  
sertraline (ZOLOFT) 100 mg tablet Take 100 mg by mouth daily. 10/30/20  Yes Provider, Historical  
sertraline (ZOLOFT) 25 mg tablet Take 25 mg by mouth daily. Take with 100 mg tablet every morning 10/6/20  Yes Provider, Historical  
busPIRone (BUSPAR) 10 mg tablet TAKE ONE TABLET BY MOUTH TWICE A DAY 19   Shila Lobo MD  
 
 
Allergies/Social/Family History: Allergies Allergen Reactions  Avapro [Irbesartan] Unable to Obtain  Bactrim [Sulfamethoxazole-Trimethoprim] Other (comments) Sore mouth  Contrast Agent [Iodine] Itching [address]  Morphine Nausea and Vomiting and Swelling  Penicillins Hives Did not tolerate cefepime 3/2020  Pravachol [Pravastatin] Nausea Only  Seafood [Shellfish Containing Products] Hives  Singulair [Montelukast] Other (comments)  
  palpitations  Ace Inhibitors Cough  Amlodipine Swelling  Captopril Cough  Carvedilol Diarrhea Social History Tobacco Use  Smoking status: Never Smoker  Smokeless tobacco: Never Used Substance Use Topics  Alcohol use: Not Currently Family History Problem Relation Age of Onset  Cancer Mother  Heart Disease Mother  Alcohol abuse Father  Diabetes Brother  Hypertension Brother Review of Systems:  
 
Pertinent items are noted in HPI. Objective:  
Vital Signs: 
Visit Vitals /68 Pulse 91 Temp 98.2 °F (36.8 °C) Resp 16 Ht 5' 10\" (1.778 m) Wt 112.5 kg (248 lb 0.3 oz) SpO2 98% BMI 35.59 kg/m²  O2 Flow Rate (L/min): 2 l/min O2 Device: None (Room air) Temp (24hrs), Av.3 °F (36.8 °C), Min:94 °F (34.4 °C), Max:99.8 °F (37.7 °C) CVP (mmHg): 9 mmHg (04/10/21 1000) Intake/Output:  
 
Intake/Output Summary (Last 24 hours) at 4/10/2021 1050 Last data filed at 4/10/2021 1000 Gross per 24 hour Intake 896.73 ml Output 2935 ml Net -2038.27 ml Physical Exam: 
 
General: NAD, + F/C, irritable Eye:  PERRLA Neurologic: Alert and oriented x3 Neck:  Supple, no JVD Lungs:  CTAB Heart:  RRR, + murmur, 2+ pulses Abdomen: soft, nontender, nondistended Ext: L BKA 
 
LABS AND  DATA: Personally reviewed Recent Labs 04/10/21 
7037 04/09/21 
2769 WBC 5.6 5.2 HGB 11.0* 12.1 HCT 36.0 40.2  169 Recent Labs 04/10/21 
7716 04/09/21 
1132 04/09/21 
8758 * 132* 131* K 4.3 4.5 5.3*  
 103 103 CO2 20* 20* 17* BUN 45* 42* 44* CREA 2.94* 3.03* 3.12* * 170* 210* CA 8.1* 8.9 9.2 MG 2.3  --  2.5* PHOS 4.3  --  5.4* Recent Labs 04/10/21 
3555 04/09/21 
4371 AP 76 87  
TP 5.6* 6.8 ALB 2.5* 2.9*  
GLOB 3.1 3.9 Recent Labs 04/08/21 
0103 04/07/21 
1737 APTT 67.4* 85.1* No results for input(s): PHI, PCO2I, PO2I, FIO2I in the last 72 hours. No results for input(s): CPK, CKMB, TROIQ, BNPP in the last 72 hours. RA 
 
MEDS: Reviewed Chest X-Ray: personally reviewed and report checked- No evidence of acute cardiopulmonary process. · LV: Estimated LVEF is 20 - 25%. Normal cavity size. Moderate concentric hypertrophy. Severely reduced systolic function. · RV: Dilated right ventricle. Mildly reduced systolic function. Pacing wire present · MV: Mitral valve non-specific thickening. Mild mitral annular calcification. Moderate to severe mitral valve regurgitation is present. · TV: Mild tricuspid valve regurgitation is present. · PA: Moderate pulmonary hypertension. Pulmonary arterial systolic pressure is 47 mmHg. · RA: Mildly dilated right atrium.  
 
Assessment and Plan:  
 
NSTEMI: with multivessel disease, no a candidate for CABG per CTS. On dobutamine gtt for decompensated heart failure. Management per cards, continue Raul Antonio Plan for high risk PCI per cards. Acute decompensated heart failure. Continue diuresis as tolerated. Closely monitor hemodynamics, has PA catheter CI ~1.7 Paroxysmal AF Rate controlled. AC on hold for now per cards. PARAG on CKD4 Nephrology following, renal function improving. Continue diuresis and inotropic support. Cardiorenal syndrome Elevated LFTs - likely hepatic congestion DM2: Lantus; Lispro w meals; SSI HLD: Holding statin due to transaminitis Anxiety: Zoloft Deconditioning: PT/OT when appropriate; c/b hx of BKA w poorly fitting prosthesis Her prognosis is very guarded. She is deemed a very poor candidate for CABG. Hemodynamic mgmt per cardiology directed by PA cath. Planned PCI next week. Nephrology following CRITICAL CARE CONSULTANT NOTE I had a face to face encounter with the patient, reviewed and interpreted patient data including clinical events, labs, images, vital signs, I/O's, and examined patient. I have discussed the case and the plan and management of the patient's care with the consulting services, the bedside nurses and the respiratory therapist.   
 
 
Tor Atkinson MD 
[address] 4/10/2021

## 2021-04-10 NOTE — PROGRESS NOTES
0730 - Bedside verbal report received from off going shift. 0800 - Assessment complete, see summary for details. Resting quietly with eyes closed, easily awakens to voice, Oriented x4 and follows all commands. C/O generalized discomfort especially right wrist and Melchor Cirri placement site. Will titrate Dobutamine gtt as tolerated to maintain MAP >65, CI>1.5. Re-positioned for comfort, call bell within reach. No acute distress noted. 0968 - Given Fentanyl 25 mcg for C/O generalized discomfort. 0 - Dr. Valarie Reyes at bedside, updated on condition, no new orders noted. 1110 - C/O throat pain, asking for \"something to drink\". Dr. Valarie Reyes contacted, orders noted. Dr. Kenneth Ribeiro paged and updated on condition, no plan for MIKE today, OK to resume PO intake. 1200 - Assessment complete, no changes noted from previous assessment. 1230 - Given Fentanyl 25 mcg IV for continued C/O generalized pain > right wrist/neck 1600 - Re-assessment complete, re-positioned for comfort. Given Fentanyl 25 mcg IV for comfort. Call bell within reach. 1900 - End of Shift Note Bedside shift change report given to Quynh Rodriguez (oncoming nurse) by Jacque Armijo RN (offgoing nurse). Report included the following information Kardex, Intake/Output, MAR, Cardiac Rhythm SR/PACED and Quality Measures Shift worked:  7a-7p Shift summary and any significant changes:  
  see above Concerns for physician to address:  see above Zone phone for oncoming shift:   N/A Activity: 
Activity Level: Bed Rest 
Number times ambulated in hallways past shift: 0 Number of times OOB to chair past shift: 0 Cardiac:  
Cardiac Monitoring: Yes     
Cardiac Rhythm: Normal sinus rhythm, Paced Access:  
Current line(s): PIV Genitourinary:  
Urinary status: buckley Respiratory:  
O2 Device: None (Room air) Chronic home O2 use?: NO Incentive spirometer at bedside: NO 
  
GI: 
Last Bowel Movement Date: 04/08/21 Current diet:  DIET DIABETIC CONSISTENT CARB Regular; 60-60-60; AHA-LOW-CHOL FAT Passing flatus: YES Tolerating current diet: YES 
% Diet Eaten: 100 % Pain Management:  
Patient states pain is manageable on current regimen: YES Skin: 
Valdemar Score: 14 Interventions: turn team, speciality bed, float heels and internal/external urinary devices Patient Safety: 
Fall Score: Total Score: 3 Interventions: bed/chair alarm High Fall Risk: Yes Length of Stay: 
Expected LOS: - - - Actual LOS: 3 Debbie Rutledge RN

## 2021-04-10 NOTE — PROGRESS NOTES
[address]  [phone] Cardiology Progress Note 4/10/2021 0915 AM  
 
Admit Date: 4/7/2021 Admit Diagnosis:  
Chest pain [R07.9] NSTEMI (non-ST elevated myocardial infarction) ([facility] [I21.4] CHF (congestive heart failure) ([facility] [I50.9] Subjective:  
 
Virginia Morrow complains of discomfort at her right IJ neck site, and at her right wrist where we attempted right radial access. No chest pain. She states she has chronic stomach pain and takes narcotics intermittently at home. Cardiac index has improved from 1.1 on initial placement of [place]-Brunilda catheter to 1.8 currently on dobutamine. Visit Vitals /66 Pulse 91 Temp 98 °F (36.7 °C) Resp 18 Ht 5' 10\" (1.778 m) Wt 256 lb 13.4 oz (116.5 kg) SpO2 100% BMI 36.85 kg/m² CI 1.8 PA down from 60s to 42/22 currently, SVO 2 is currently 78 Current Facility-Administered Medications Medication Dose Route Frequency  pantoprazole (PROTONIX) tablet 40 mg  40 mg Oral ACB&D  DOBUTamine (DOBUTREX) 500 mg/250 mL (2,000 mcg/mL) infusion  0-10 mcg/kg/min IntraVENous TITRATE  ticagrelor (BRILINTA) tablet 90 mg  90 mg Oral Q12H  
 [Held by provider] carvediloL (COREG) tablet 3.125 mg  3.125 mg Oral BID WITH MEALS  insulin glargine (LANTUS) injection 10 Units  10 Units SubCUTAneous QHS  insulin lispro (HUMALOG) injection 5 Units  5 Units SubCUTAneous TIDAC  alcohol 62% (NOZIN) nasal  1 Ampule  1 Ampule Topical Q12H  
 heparin (porcine) injection 5,000 Units  5,000 Units SubCUTAneous Q8H  
 bumetanide (BUMEX) tablet 1 mg  1 mg Oral BID  nitroglycerin (NITROSTAT) tablet 0.4 mg  0.4 mg SubLINGual PRN  
 sodium chloride (NS) flush 5-40 mL  5-40 mL IntraVENous Q8H  
 sodium chloride (NS) flush 5-40 mL  5-40 mL IntraVENous PRN  
 acetaminophen (TYLENOL) tablet 650 mg  650 mg Oral Q6H PRN  Or  
 acetaminophen (TYLENOL) suppository 650 mg  650 mg Rectal Q6H PRN  polyethylene glycol (MIRALAX) packet 17 g  17 g Oral DAILY PRN  promethazine (PHENERGAN) tablet 12.5 mg  12.5 mg Oral Q6H PRN Or  
 ondansetron (ZOFRAN) injection 4 mg  4 mg IntraVENous Q6H PRN  
 insulin lispro (HUMALOG) injection   SubCUTAneous AC&HS  
 glucose chewable tablet 16 g  4 Tab Oral PRN  
 dextrose (D50W) injection syrg 12.5-25 g  12.5-25 g IntraVENous PRN  
 glucagon (GLUCAGEN) injection 1 mg  1 mg IntraMUSCular PRN  
 aspirin delayed-release tablet 81 mg  81 mg Oral DAILY  fentaNYL citrate (PF) injection 25 mcg  25 mcg IntraVENous Q2H PRN Objective:  
  
Physical Exam: 
Gen: chronically ill appearing  female, ashen appearance, in NAD Abdomen: soft, non-tender. Bowel sounds normal, obese Extremities: left leg BKA, right leg trace edema, discolored. Right radial and right femoral sites CDI. Heart: regular rate and rhythm, no murmur, S3/JVD Lungs: diminished, crackles bilateral posterior bases Neck: supple, symmetrical, trachea midline Neurologic: alert and oriented x 4, calm Data Review:  
Recent Labs 04/09/21 
4610 04/08/21 
0103 WBC 5.2 3.9 HGB 12.1 11.5 HCT 40.2 38.2  252 Recent Labs 04/09/21 
1132 04/09/21 
0558 04/08/21 
1600 04/08/21 
0103 * 131* 131* 129*  
K 4.5 5.3* 4.8 4.7  103 103 104 CO2 20* 17* 16* 15* * 210* 330* 248* BUN 42* 44* 40* 37* CREA 3.03* 3.12* 2.87* 2.75* CA 8.9 9.2 8.8 9.1 MG  --  2.5*  --  2.3 PHOS  --  5.4*  --  4.9* ALB  --  2.9*  --   --   
TBILI  --  1.4*  --   --   
ALT  --  30  --   -- No results for input(s): TROIQ, CPK, CKMB in the last 72 hours. Intake/Output Summary (Last 24 hours) at 4/10/2021 0459 Last data filed at 4/10/2021 0200 Gross per 24 hour Intake 448.92 ml Output 1315 ml Net -866.08 ml Cardiographics 
  Telemetry: Paced ECG: Paced, no acute changes Echocardiogram: 3/1/2021 · LV: Estimated LVEF is 50 - 55%.  Visually measured ejection fraction. Normal cavity size. Upper normal wall thickness. Low normal systolic function. · MV: Mitral annular calcification. Mild to moderate mitral valve regurgitation is present. · TV: Mild to moderate tricuspid valve regurgitation is present. · PA: Pulmonary arterial systolic pressure is 44 mmHg. · Image quality for this study was technically difficult. New ECHO 4/7/2021:  
· LV: Estimated LVEF is 20 - 25%. Normal cavity size. Moderate concentric hypertrophy. Severely reduced systolic function. · RV: Dilated right ventricle. Mildly reduced systolic function. Pacing wire present · MV: Mitral valve non-specific thickening. Mild mitral annular calcification. Moderate to severe mitral valve regurgitation is present. · TV: Mild tricuspid valve regurgitation is present. · PA: Moderate pulmonary hypertension. Pulmonary arterial systolic pressure is 47 mmHg. · RA: Mildly dilated right atrium. CXRAY: \"No focal infiltrate\" \"lungs are clear\" Assessment:  
 
Active Problems: 
  Chest pain (11/23/2020) CHF (congestive heart failure) ([address] (4/7/2021) NSTEMI (non-ST elevated myocardial infarction) ([address] (4/7/2021) S/P cardiac cath (4/8/2021) Overview: 4/8/2021: cardiac cath showed MVD. Mod/severe MR, elevated PA pressures Plan:  
Chirag Zazueta is a 63 y.o. female with a PMH significant for chronic PAF, right arm lymphadema (s/p lymphectomy), bilateral mastectomy for breast CA, HTN. HLD, neuropathy, left BKA, SSS, DM II, CKD, Asthma, A-flutter, long term use of OAC, endocarditis  admitted for Chest pain [R07.9] NSTEMI (non-ST elevated myocardial infarction) ([address] [I21.4] CHF (congestive heart failure) ([address] [I50.9].   
NSTEMI: s/p cardiac cath which showed MVD, mod/severe MR, and PHTN, cardiogenic shock due to ischemic cardiomyopathy: 
Troponin peaked at 55, trended down to 28, no further trending necessary. Chest pain resolved Cardiac index improved from 1.1-1.8, SPO2 79, PA pressure is improved from the 60s to 42/22 with dobutaminecontinue ECHO showed new onset ICM; EF 20-25%. Would likely repeat limited echo on dobutamine on Sunday to reassess LV function and wall motion Patient declined for cardiac surgery due to prohibitively high riskinitiate Brilinta in preparation for possible high risk PCI when optimized The patient understands she is critically ill and has very serious heart problems Moderate to severe mitral regurgitation: 
Eventual MIKE for further assessment of MR, but held on Friday due to borderline hypotension and the fact that she is not a candidate for surgery 
  
Acute on chronic systolic HF, with cardiogenic shock ECHO result as above Continue bumex 1 mg PO BID (as per nephrology), Coreg and ACE inhibitor/ARB/Entresto contraindicated due to cardiogenic shock 
  
Parox A-fib s/p AV node ablation   
Hold [address] [place] [address] pending decision on Glenn catheter and/or ultrafiltration Coreg held with cardiogenic shock on dobutamine 
  
Hypertension: controlled Continue diuretic  
  
PARAG on Chronic kidney disease stage IV : baseline 2-2.1 Renal function worse from baseline-Cr 3.12 Avoid nephrotoxic substances Follow BMP Nephrology following, diuresing for congestion, may need UF 
  
DM II: 
Manage per internal medicine  
  
HLD:  
LDL on 12/30/2020 21. Patient not on statin due to elevated LFT's, monitor.  
  
Anxiety:   
Continue Zoloft  
  
Chronic pain: 
Management as per pulmonary critical care and internal medicine Greater than 30 minutes of critical care time spent at the bedside exclusive of procedures discussing management of inotropes, [place]-Brunilda catheter etc. with nurses.

## 2021-04-11 NOTE — PROGRESS NOTES
I spoke with the patient's nurse. Noted cardiac index is steady at about 1.8 and the patient is feeling fine. Noted improved creatinine and good urine output, no indication for hemodialysis at this time. Will start IV heparin in case clinical status changes and she needs procedures prior to Monday. Advised the nurse and patient to call if any concerns.

## 2021-04-11 NOTE — PROGRESS NOTES
[facility] 
 
ICU TEAM Progress Note Name: Ester England : 1960 MRN: [id number] Date: 2021 Subjective:  
Progress Note: 2021 Ms Héctor Rojas is a 62 yo female with a PMH of mitral regurgitation, CAD, HTN, HLD, paroxysmal atrial fibrillation/atrial flutter s/p AV node ablation, sick sinus syndrome s/p pacemaker, AV endocarditis, DM2, CKD4 (baseline Cr 2-2.2), neuropathy, L BKA, GERD, anemia, lymphedema, and gastric emma-en-y. As per chart review, she presented to [address] [place] [address] as a transfer from [facility] on . At the OSH, she had been treated for chest pain, NSTEMI, and HF. Troponin peaked at 55 and downtrended to 28. [facility]TE from [facility] showed EF 10-15% (prior normal EF). [facility] [address] [facility] At [address] [place] [address], she has been seen an evaluated by Dr Pattie Pimentel. Of note, patient has allergies to contrast (noted in allergy list). S/P: Procedure(s): LEFT AND RIGHT HEART CATH / CORONARY ANGIOGRAPHY Aortagram Abdominal W Run[place] Active Problem List:  
 
Problem List  Date Reviewed: 3/1/2021 Codes Class S/P atrioventricular rolando ablation ICD-10-CM: D87.059 ICD-9-CM: V45.89 S/P cardiac cath ICD-10-CM: Z98.890 ICD-9-CM: V45.89 Overview Signed 2021  1:01 PM by Diane Pineda NP  
  2021: cardiac cath showed MVD. Mod/severe MR, elevated PA pressures CHF (congestive heart failure) (HCC) ICD-10-CM: I50.9 ICD-9-CM: 428.0   
   
 NSTEMI (non-ST elevated myocardial infarction) ([facility] ICD-10-CM: I21.4 ICD-9-CM: 410.70 Gastroesophageal reflux disease without esophagitis ICD-10-CM: K21.9 ICD-9-CM: 530.81 Acute respiratory failure (HCC) ICD-10-CM: J96.00 
ICD-9-CM: 518.81   
   
 UTI (urinary tract infection) ICD-10-CM: N39.0 ICD-9-CM: 599.0 Pneumonia ICD-10-CM: J18.9 ICD-9-CM: 037  SOB (shortness of breath) ICD-10-CM: R06.02 
ICD-9-CM: 786.05   
   
 CKD (chronic kidney disease) ICD-10-CM: N18.9 ICD-9-CM: 770. 9 Anticoagulated ICD-10-CM: Z79.01 
ICD-9-CM: V58.61 PARAG (acute kidney injury) ([facility] ICD-10-CM: N17.9 ICD-9-CM: 212. 9 Fluid overload ICD-10-CM: E87.70 ICD-9-CM: 276.69 Subtherapeutic international normalized ratio (INR) ICD-10-CM: R79.1 ICD-9-CM: 790.92 Suspected COVID-19 virus infection ICD-10-CM: S06.392 ICD-9-CM: V01.79 Chest pain ICD-10-CM: R07.9 ICD-9-CM: 786.50 Left below-knee amputee [facility]) ICD-10-CM: L01.116 ICD-9-CM: V49.75   
   
 H/O bilateral mastectomy ICD-10-CM: Z90.13 ICD-9-CM: V45.71 Foot deformity, right ICD-10-CM: M21.961 ICD-9-CM: 736.70 Pes cavus ICD-10-CM: Q66.70 ICD-9-CM: 736.73 Diabetic polyneuropathy associated with type 2 diabetes mellitus ([facility] ICD-10-CM: E11.42 
ICD-9-CM: 250.60, 357.2 Morbid obesity with BMI of 40.0-44.9, adult (HCC) ICD-10-CM: E66.01, Z68.41 
ICD-9-CM: 278.01, V85.41 Controlled type 2 diabetes mellitus with stage 4 chronic kidney disease, with long-term current use of insulin (HCC) ICD-10-CM: E11.22, N18.4, Z79.4 ICD-9-CM: 250.40, 585.4, V58.67 PAF (paroxysmal atrial fibrillation) (HCC) ICD-10-CM: I48.0 ICD-9-CM: 427.31 Anemia in stage 4 chronic kidney disease (HCC) ICD-10-CM: N18.4, D63.1 ICD-9-CM: 285.21, 585.4 Essential hypertension ICD-10-CM: I10 
ICD-9-CM: 401.9 Systolic murmur AGB-35-MI: R01.1 ICD-9-CM: 785.2 CKD (chronic kidney disease), stage IV ([facility] ICD-10-CM: N18.4 ICD-9-CM: 585.4 Overview Addendum 3/12/2013  8:11 AM by Bong Mejia. Acute on chronic, Dr. Benjamin Rader Mixed hyperlipidemia ICD-10-CM: E78.2 ICD-9-CM: 272.2 Long term (current) use of anticoagulants ICD-10-CM: Z79.01 
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 Sick sinus syndrome (HCC) (Chronic) ICD-10-CM: I49.5 ICD-9-CM: 427.81 Overview Signed 4/2/2012  1:37 PM by Mita Waters NP With 5 second and greater pauses, dual chamber pacemaker placement and loop recorder explant done>medtronic device Status post ablation of atrial fibrillation (Chronic) ICD-10-CM: Z98.890, Z86.79 
ICD-9-CM: V45.89 Overview Signed 12/15/2011  9:23 AM by Demarco Bro NP  
  12/14/11 PVI ablation of atrial fibrillation Fe deficiency anemia ICD-10-CM: D50.9 ICD-9-CM: 280.9 Overview Signed 4/23/2010 12:50 PM by Oxana Arrington. EGD and Colonoscopy neg latter repeat in 10 years History of breast cancer ICD-10-CM: Z85.3 ICD-9-CM: V10.3 Overview Addendum 6/11/2018  4:11 PM by Giovanna Sawyer MD  
  1998, right modified radical mastectomy  0/11 nodes, + est and pro receptors. chemotherapy x 4 `1999 2008 left cancer with mastectomy and bilateral reconstruction using own tissues. Asthma ICD-10-CM: H15.930 ICD-9-CM: 493.90 Overview Signed 4/13/2010  9:20 AM by Oxana Arrington.  
  infectious Past Medical History:  
 
 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 She also has no past medical history of Adverse effect of anesthesia, Difficult intubation, Malignant hyperthermia due to anesthesia, or Pseudocholinesterase deficiency. Past Surgical History:  
 
 has a past surgical history that includes pr laminectomy,cervical (1994); pr gastric bypass,obese<150cm emma-en-y (7/08); pr anesth,knee area surgery (1982 AND 1994); hx cervical fusion (1994); hx dilation and curettage (1992); hx carpal tunnel release; hx mastectomy (1998); ir insert tunl cvc w port over 5 years; pr trabeculoplasty by laser surgery; pr needle biopsy liver,w othr proc (8.21.07); us guide asp ovarian cyst abd approach (8.21.07); hx afib ablation; hx pacemaker (11/17/2020); hx mohs procedure (1995); pr abdomen surgery proc unlisted; hx orthopaedic (Right); hx mastectomy (Left); hx pacemaker; hx breast reconstruction (Bilateral); pr relocation of skin pocket for pacemaker (N/A, 4/24/2020); pr rmvl transvns pm eltrd 1 lead sys atr/ventr (N/A, 4/24/2020); pr rmvl transvns pm eltrd 1 lead sys atr/ventr (N/A, 4/27/2020); ir insert tunl cvc w/o port over 5 yr (5/4/2020); ir remove tunl cvad w/o port / pump (6/26/2020); ir insert tunl cvc w/o port over 5 yr (9/8/2020); hx amputation foot (Left, 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 Home Medications:  
 
Prior to Admission medications Medication Sig Start Date End Date Taking? Authorizing Provider QUEtiapine (SEROqueL) 25 mg tablet Take 1 Tab by mouth two (2) times a day for 30 days. Indications: additional medications to treat depression 3/11/21 4/10/21 Yes Chavo Roberts MD  
cholecalciferol (Vitamin D3) (1000 Units /25 mcg) tablet Take 1,000 Units by mouth daily.    Yes Provider, Historical vitamin a-vitamin c-vit e-min (OCUVITE) tablet Take 1 Tab by mouth daily. Yes Provider, Historical  
cyanocobalamin (Vitamin B-12) 500 mcg tablet Take 500 mcg by mouth daily. Yes Provider, Historical  
pantoprazole (PROTONIX) 40 mg tablet Take 1 Tab by mouth Before breakfast and dinner. 20  Yes Bjorn Meckel, MD  
sertraline (ZOLOFT) 100 mg tablet Take 100 mg by mouth daily. 10/30/20  Yes Provider, Historical  
sertraline (ZOLOFT) 25 mg tablet Take 25 mg by mouth daily. Take with 100 mg tablet every morning 10/6/20  Yes Provider, Historical  
busPIRone (BUSPAR) 10 mg tablet TAKE ONE TABLET BY MOUTH TWICE A DAY 19   Elizabeth Luevano MD  
 
 
Allergies/Social/Family History: Allergies Allergen Reactions  Avapro [Irbesartan] Unable to Obtain  Bactrim [Sulfamethoxazole-Trimethoprim] Other (comments) Sore mouth  Contrast Agent [Iodine] Itching [address]  Morphine Nausea and Vomiting and Swelling  Penicillins Hives Did not tolerate cefepime 3/2020  Pravachol [Pravastatin] Nausea Only  Seafood [Shellfish Containing Products] Hives  Singulair [Montelukast] Other (comments)  
  palpitations  Ace Inhibitors Cough  Amlodipine Swelling  Captopril Cough  Carvedilol Diarrhea Social History Tobacco Use  Smoking status: Never Smoker  Smokeless tobacco: Never Used Substance Use Topics  Alcohol use: Not Currently Family History Problem Relation Age of Onset  Cancer Mother  Heart Disease Mother  Alcohol abuse Father  Diabetes Brother  Hypertension Brother Review of Systems:  
 
Pertinent items are noted in HPI. Objective:  
Vital Signs: 
Visit Vitals /61 Pulse 90 Temp 98.9 °F (37.2 °C) Resp 19 Ht 5' 10\" (1.778 m) Wt 112.5 kg (248 lb 0.3 oz) SpO2 100% BMI 35.59 kg/m²  O2 Flow Rate (L/min): 2 l/min O2 Device: None (Room air) Temp (24hrs), Av °F (36.1 °C), Min:35.4 °F (1.9 °C), Max:99.3 °F (37.4 °C) CVP (mmHg): 7 mmHg (04/11/21 1000) Intake/Output:  
 
Intake/Output Summary (Last 24 hours) at 4/11/2021 1036 Last data filed at 4/11/2021 1000 Gross per 24 hour Intake 1152.64 ml Output 3900 ml Net -2747.36 ml Physical Exam: 
 
General: NAD, + F/C Eye:  PERRLA Neurologic: Alert and oriented x3 Neck:  Supple, no JVD Lungs:  CTAB Heart:  RRR, + murmur, 2+ pulses Abdomen: soft, nontender, nondistended Ext: L BKA 
 
LABS AND  DATA: Personally reviewed Recent Labs 04/11/21 
0425 04/10/21 
6561 WBC 7.3 5.6 HGB 11.0* 11.0*  
HCT 35.2 36.0  205 Recent Labs 04/11/21 
0425 04/10/21 
8572 * 134* K 3.8 4.3  103 CO2 24 20* BUN 45* 45* CREA 2.91* 2.94* * 242* CA 8.5 8.1*  
MG 2.3 2.3 PHOS 3.9 4.3 Recent Labs 04/11/21 
0425 04/10/21 
1872 AP 73 76  
TP 5.6* 5.6* ALB 2.3* 2.5*  
GLOB 3.3 3.1 Recent Labs 04/11/21 
0425 04/10/21 
2345 APTT 39.2* 58.7* No results for input(s): PHI, PCO2I, PO2I, FIO2I in the last 72 hours. No results for input(s): CPK, CKMB, TROIQ, BNPP in the last 72 hours. RA 
 
MEDS: Reviewed Chest X-Ray: personally reviewed and report checked- No evidence of acute cardiopulmonary process. · LV: Estimated LVEF is 20 - 25%. Normal cavity size. Moderate concentric hypertrophy. Severely reduced systolic function. · RV: Dilated right ventricle. Mildly reduced systolic function. Pacing wire present · MV: Mitral valve non-specific thickening. Mild mitral annular calcification. Moderate to severe mitral valve regurgitation is present. · TV: Mild tricuspid valve regurgitation is present. · PA: Moderate pulmonary hypertension. Pulmonary arterial systolic pressure is 47 mmHg. · RA: Mildly dilated right atrium. Assessment and Plan:  
 
NSTEMI: with multivessel disease, no a candidate for CABG per CTS.  
On dobutamine gtt for decompensated heart failure. Management per cards, continue [facility] Plan for high risk PCI per cards. Heparin gtt. Acute decompensated heart failure with cardiogenic shock. Continue dobutamine drip. Continue diuresis as tolerated. Closely monitor hemodynamics, has PA catheter CI ~2 today Paroxysmal AF Rate controlled. AC on hold for now per cards. PARAG on CKD4 Nephrology following, renal function improving. Continue diuresis and inotropic support. Cardiorenal syndrome Elevated LFTs - likely hepatic congestion DM2: Lantus; Lispro w meals; SSI HLD: Holding statin due to transaminitis Anxiety: Zoloft Deconditioning: PT/OT when appropriate; c/b hx of BKA w poorly fitting prosthesis Her prognosis is very guarded. She is deemed a very poor candidate for CABG. Hemodynamic mgmt per cardiology directed by PA cath. Planned PCI next week. Nephrology following CRITICAL CARE CONSULTANT NOTE I had a face to face encounter with the patient, reviewed and interpreted patient data including clinical events, labs, images, vital signs, I/O's, and examined patient. I have discussed the case and the plan and management of the patient's care with the consulting services, the bedside nurses and the respiratory therapist.   
 
40 minutes of critical care time spent without overlap and excluding procedures. Cali Gutierrez MD 
[address] 4/11/2021

## 2021-04-11 NOTE — PROGRESS NOTES
Nephrology Progress Note Monroe Kiser  
 
www. [facility]  Phone - [phone] Patient: Nelsy Rodriguez    YOB: 1960 Date- 4/11/2021   Admit Date: 4/7/2021 CC: Follow up for  PARAG on CKD stage 4 IMPRESSION & PLAN:  
PARAG stage 1 on CKD stage 4: 
-suspect CKD sec to DM2 and HTN and PARAG sec to CRS from suppressed EF from recent MI and NOE. -appears to be responding to inotropes 
-cont current care with dobutamine and oral bumex 
-can intensify diuretics if UOP declines 
-no urgent need for RRT at this time 
-daily labs and strict I/Os NSTEMI: 
-S/P LHC, per cath labs diffuse CAD 
-not a candidate for CABg 
-Plan for PCI next week. 
  
Systolic HF: 
- diuretics and inotropes as above 
  
DM2: 
-Per primary team 
  
HTN: 
- BP stable 
   
Subjective: Interval History:  
Seen and examined. Voiding well. Cr stable. Stable on NC. Remains on dobutamine infusion. C/o fatigue. No cp, sob, n/v/d. Objective:  
Vitals:  
 04/11/21 1100 04/11/21 1200 04/11/21 1300 04/11/21 1328 BP: 118/70 125/72 124/67 124/67 Pulse: 91 91 91 Resp: 21 18 20 Temp: 98.8 °F (37.1 °C) 99 °F (37.2 °C) 99.1 °F (37.3 °C) SpO2: 100% 99% 100% Weight:    112.5 kg (248 lb 0.3 oz) Height:    5' 10\" (1.778 m) 04/10 0701 - 04/11 0700 In: 1211.6 [P.O.:480; I.V.:731.6] Out: 0891 [[id number]:2007] Last 3 Recorded Weights in this Encounter 04/08/21 0300 04/10/21 0557 04/11/21 1328 Weight: 116.5 kg (256 lb 13.4 oz) 112.5 kg (248 lb 0.3 oz) 112.5 kg (248 lb 0.3 oz) Physical exam:  
GEN: NAD NECK- + swan, no JVD RESP: No wheezing, Clear b/l CVS: S1,S2  RRR 
NEURO: Normal speech, Non focal 
EXT: trace LE edema PSYCH: Normal Mood : buckley in place Chart reviewed. Pertinent Notes reviewed. Data Review : 
Recent Labs 04/11/21 
0425 04/10/21 
9460 04/09/21 
1132 04/09/21 
2564 * 134* 132* 131*  
K 3.8 4.3 4.5 5.3*  
 103 103 103  
CO2 24 20* 20* 17* BUN 45* 45* 42* 44* CREA 2.91* 2.94* 3.03* 3.12* * 242* 170* 210* CA 8.5 8.1* 8.9 9.2 MG 2.3 2.3  --  2.5* PHOS 3.9 4.3  --  5.4* Recent Labs 04/11/21 
0425 04/10/21 
0519 04/09/21 
0696 WBC 7.3 5.6 5.2 HGB 11.0* 11.0* 12.1 HCT 35.2 36.0 40.2  205 169 No results for input(s): FE, TIBC, PSAT, FERR in the last 72 hours. Medication list  reviewed Current Facility-Administered Medications Medication Dose Route Frequency  [Held by provider] apixaban (ELIQUIS) tablet 5 mg  5 mg Oral Q12H  
 magic mouthwash cpd (without sucralfate)  5 mL Oral TID PRN  
 heparin 25,000 units in D5W 250 ml infusion  8-25 Units/kg/hr IntraVENous TITRATE  heparin (porcine) injection 2,000 Units  2,000 Units IntraVENous PRN Or  
 heparin (porcine) injection 4,000 Units  4,000 Units IntraVENous PRN  pantoprazole (PROTONIX) tablet 40 mg  40 mg Oral ACB&D  DOBUTamine (DOBUTREX) 500 mg/250 mL (2,000 mcg/mL) infusion  0-10 mcg/kg/min IntraVENous TITRATE  ticagrelor (BRILINTA) tablet 90 mg  90 mg Oral Q12H  
 [Held by provider] carvediloL (COREG) tablet 3.125 mg  3.125 mg Oral BID WITH MEALS  insulin glargine (LANTUS) injection 10 Units  10 Units SubCUTAneous QHS  [Held by provider] insulin lispro (HUMALOG) injection 5 Units  5 Units SubCUTAneous TIDAC  alcohol 62% (NOZIN) nasal  1 Ampule  1 Ampule Topical Q12H  
 bumetanide (BUMEX) tablet 1 mg  1 mg Oral BID  nitroglycerin (NITROSTAT) tablet 0.4 mg  0.4 mg SubLINGual PRN  
 sodium chloride (NS) flush 5-40 mL  5-40 mL IntraVENous Q8H  
 sodium chloride (NS) flush 5-40 mL  5-40 mL IntraVENous PRN  
 acetaminophen (TYLENOL) tablet 650 mg  650 mg Oral Q6H PRN Or  
 acetaminophen (TYLENOL) suppository 650 mg  650 mg Rectal Q6H PRN  polyethylene glycol (MIRALAX) packet 17 g  17 g Oral DAILY PRN  promethazine (PHENERGAN) tablet 12.5 mg  12.5 mg Oral Q6H PRN  Or  
 ondansetron (ZOFRAN) injection 4 mg  4 mg IntraVENous Q6H PRN  
 insulin lispro (HUMALOG) injection   SubCUTAneous AC&HS  
 glucose chewable tablet 16 g  4 Tab Oral PRN  
 dextrose (D50W) injection syrg 12.5-25 g  12.5-25 g IntraVENous PRN  
 glucagon (GLUCAGEN) injection 1 mg  1 mg IntraMUSCular PRN  
 aspirin delayed-release tablet 81 mg  81 mg Oral DAILY  fentaNYL citrate (PF) injection 25 mcg  25 mcg IntraVENous Q2H PRN Callie Blackmon MD             
[facility] / [employer] [address], [address] [place], [address] [place] [address] Phone - [phone]               Fax - [phone]

## 2021-04-11 NOTE — PROGRESS NOTES
[address]  [phone] Cardiology Progress Note 4/11/2021 0915 AM  
 
Admit Date: 4/7/2021 Admit Diagnosis:  
Chest pain [R07.9] [facility]non[facility] [I21.4] CHF (congestive heart failure) ([facility] [I50.9] Subjective:  
 
Natali Murrieta denies chest pain or shortness of breath. Mixed venous oxygen saturation was 76% and cardiac index 2.0 on rounds today. PA pressures in the 50s over low 20s. Visit Vitals /66 Pulse 91 Temp 98.9 °F (37.2 °C) Resp 23 Ht 5' 10\" (1.778 m) Wt 248 lb 0.3 oz (112.5 kg) SpO2 100% BMI 35.59 kg/m² Current Facility-Administered Medications Medication Dose Route Frequency  [Held by provider] apixaban (ELIQUIS) tablet 5 mg  5 mg Oral Q12H  
 magic mouthwash cpd (without sucralfate)  5 mL Oral TID PRN  
 heparin 25,000 units in D5W 250 ml infusion  8-25 Units/kg/hr IntraVENous TITRATE  heparin (porcine) injection 2,000 Units  2,000 Units IntraVENous PRN Or  
 heparin (porcine) injection 4,000 Units  4,000 Units IntraVENous PRN  pantoprazole (PROTONIX) tablet 40 mg  40 mg Oral ACB&D  DOBUTamine (DOBUTREX) 500 mg/250 mL (2,000 mcg/mL) infusion  0-10 mcg/kg/min IntraVENous TITRATE  ticagrelor (BRILINTA) tablet 90 mg  90 mg Oral Q12H  
 [Held by provider] carvediloL (COREG) tablet 3.125 mg  3.125 mg Oral BID WITH MEALS  insulin glargine (LANTUS) injection 10 Units  10 Units SubCUTAneous QHS  [Held by provider] insulin lispro (HUMALOG) injection 5 Units  5 Units SubCUTAneous TIDAC  alcohol 62% (NOZIN) nasal  1 Ampule  1 Ampule Topical Q12H  
 bumetanide (BUMEX) tablet 1 mg  1 mg Oral BID  nitroglycerin (NITROSTAT) tablet 0.4 mg  0.4 mg SubLINGual PRN  
 sodium chloride (NS) flush 5-40 mL  5-40 mL IntraVENous Q8H  
 sodium chloride (NS) flush 5-40 mL  5-40 mL IntraVENous PRN  
 acetaminophen (TYLENOL) tablet 650 mg  650 mg Oral Q6H PRN Or  
 acetaminophen (TYLENOL) suppository 650 mg  650 mg Rectal Q6H PRN  polyethylene glycol (MIRALAX) packet 17 g  17 g Oral DAILY PRN  promethazine (PHENERGAN) tablet 12.5 mg  12.5 mg Oral Q6H PRN Or  
 ondansetron (ZOFRAN) injection 4 mg  4 mg IntraVENous Q6H PRN  
 insulin lispro (HUMALOG) injection   SubCUTAneous AC&HS  
 glucose chewable tablet 16 g  4 Tab Oral PRN  
 dextrose (D50W) injection syrg 12.5-25 g  12.5-25 g IntraVENous PRN  
 glucagon (GLUCAGEN) injection 1 mg  1 mg IntraMUSCular PRN  
 aspirin delayed-release tablet 81 mg  81 mg Oral DAILY  fentaNYL citrate (PF) injection 25 mcg  25 mcg IntraVENous Q2H PRN Objective:  
  
Physical Exam: 
Gen: chronically ill appearing  female, ashen appearance, in NAD Abdomen: soft, non-tender. Bowel sounds normal, obese Extremities: left leg BKA, right leg trace edema, discolored. Right radial and right femoral sites CDI. Heart: regular rate and rhythm, no murmur, S3/JVD Lungs: diminished, fewer crackles bilateral posterior bases Neck: supple, symmetrical, trachea midline Neurologic: alert and oriented x 4, calm Data Review:  
Recent Labs 04/11/21 
0425 04/10/21 
5734 04/09/21 
4759 WBC 7.3 5.6 5.2 HGB 11.0* 11.0* 12.1 HCT 35.2 36.0 40.2  205 169 Recent Labs 04/11/21 
0425 04/10/21 
9175 04/09/21 
1132 04/09/21 
2261 * 134* 132* 131*  
K 3.8 4.3 4.5 5.3*  
 103 103 103 CO2 24 20* 20* 17* * 242* 170* 210* BUN 45* 45* 42* 44* CREA 2.91* 2.94* 3.03* 3.12* CA 8.5 8.1* 8.9 9.2 MG 2.3 2.3  --  2.5* PHOS 3.9 4.3  --  5.4* ALB 2.3* 2.5*  --  2.9* TBILI 1.4* 1.5*  --  1.4* ALT 16 21  --  30 No results for input(s): TROIQ, CPK, CKMB in the last 72 hours. Intake/Output Summary (Last 24 hours) at 4/11/2021 1718 Last data filed at 4/11/2021 1600 Gross per 24 hour Intake 1100.2 ml Output 3470 ml Net -2369.8 ml  
  
 
Cardiographics 
  Telemetry: Paced ECG: Paced, no acute changes Echocardiogram: 3/1/2021 · LV: Estimated LVEF is 50 - 55%. Visually measured ejection fraction. Normal cavity size. Upper normal wall thickness. Low normal systolic function. · MV: Mitral annular calcification. Mild to moderate mitral valve regurgitation is present. · TV: Mild to moderate tricuspid valve regurgitation is present. · PA: Pulmonary arterial systolic pressure is 44 mmHg. · Image quality for this study was technically difficult. New ECHO 4/7/2021:  
· LV: Estimated LVEF is 20 - 25%. Normal cavity size. Moderate concentric hypertrophy. Severely reduced systolic function. · RV: Dilated right ventricle. Mildly reduced systolic function. Pacing wire present · MV: Mitral valve non-specific thickening. Mild mitral annular calcification. Moderate to severe mitral valve regurgitation is present. · TV: Mild tricuspid valve regurgitation is present. · PA: Moderate pulmonary hypertension. Pulmonary arterial systolic pressure is 47 mmHg. · RA: Mildly dilated right atrium. Repeat echo 4/11/2021 on dobutamine: 
· LV: Estimated LVEF is 25 - 30%. Normal cavity size. Moderate concentric hypertrophy. Moderate-to-severely reduced systolic function. Pacemaker. . 
· LA: Moderately dilated left atrium. · RV: Dilated right ventricle. Borderline low systolic function. Pacing wire present · RA: Moderately dilated right atrium. · AV: Mild aortic valve focal thickening present. · MV: Mitral valve non-specific thickening. Mild mitral annular calcification. Mild to moderate mitral valve regurgitation is present. · TV: Mild tricuspid valve regurgitation is present. · PA: Moderate pulmonary hypertension. Pulmonary arterial systolic pressure is 52 mmHg. CXRAY: \"No focal infiltrate\" \"lungs are clear\" Assessment:  
 
Active Problems: 
  Chest pain (11/23/2020) CHF (congestive heart failure) ([facility] (4/7/2021)   [facility]STEM[facility]non-ST elevated [facility] (4/7/20[facility]) S/P cardiac cath (4/8/2021) Overview: 4/8/2021: cardiac cath showed MVD. Mod/severe MR, elevated PA pressures Plan:  
Reyna Nguyen is a 63 y.o. female with a PMH significant for chronic PAF, right arm lymphadema (s/p lymphectomy), bilateral mastectomy for breast CA, HTN. HLD, neuropathy, left BKA, SSS, DM II, CKD, Asthma, A-flutter, long term use of OAC, endocarditis  admitted for Chest pain [R07.9] NSTEMI (non-ST elevated myocardial infarction) ([facility] [I21.4] CHF (congestive heart failure) [facility]   
 
NSTEMI: s/p cardiac cath which showed MVD, mod/severe MR, and PHTN, cardiogenic shock due to ischemic cardiomyopathy: 
Troponin peaked at 55, trended down to 28, no further trending necessary. Chest pain resolved Cardiac index improved from 1. 1-Mixed venous oxygen saturation was 76% and cardiac index 2.0 on rounds today. PA pressures in the 50s over low 20s. ECHO showed new onset ICM; EF 20-25%. Repeat on dobutamine 4/11/2021 showed EF of 25-30, mitral regurgitation reduced to mild to moderate. Patient declined for cardiac surgery due to prohibitively high riskinitiate Brilinta in preparation for possible high risk PCI when optimized The patient understands she is critically ill and has very serious heart problems and is extremely high risk of dying if we do nothing And we discussed that Dr. Kathleen Taylor and I feel that the risk of death with CABG is higher than that with PCI She does understand a significantly higher than average risk of contrast-induced nephropathy that could progress to end-stage renal disease requiring dialysis, and a higher than average risk of myocardial infarction and death with her severe ischemic cardiomyopathy with cardiogenic shock currently on pressors. I discussed the risks and benefits of cardiac catheterization and PCI plus or minus Impella catheter with the patient who expressed understanding and wishes to proceed.   
 
Moderate to severe mitral regurgitation: 
Eventual MIKE for further assessment of MR, but held on Friday due to borderline hypotension and the fact that she is not a candidate for surgery 
  
Acute on chronic systolic HF, with cardiogenic shock ECHO result as above Continue bumex 1 mg PO BID (as per nephrology), Coreg and ACE inhibitor/ARB/Entresto contraindicated due to cardiogenic shock 
diuresing well with about 5 L negative, creatinine stable 
 
  
Parox A-fib s/p AV node ablation   
Hold [facility] pending decision on Glenn catheter and/or ultrafiltration, currently on IV heparin Coreg held with cardiogenic shock on dobutamine 
  
Hypertension: controlled Continue diuretic  
  
PARAG on Chronic kidney disease stage IV : baseline 2-2.1 Renal function worse from baseline-Cr 3.12 Avoid nephrotoxic substances Follow [facility] Nephrology following, diuresing for congestion, may need UF 
  
DM II: 
Manage per internal medicine  
  
HLD:  
LDL 44 on 4/11/2021 Starting low-dose statin due to three-vessel disease Noted his history of questionable nausea only with pravastatin in 2010, and that statin may have been held due to elevated LFTs, but LFTs are currently normal 
 
  
Anxiety:   
Continue Zoloft  
  
Chronic pain: 
Management as per pulmonary critical care and internal medicine Greater than 30 minutes of critical care time spent at the bedside exclusive of procedures discussing management of inotropes, Shiloh-Brunilda catheter etc. with nurses.

## 2021-04-11 NOTE — PROGRESS NOTES
0730 - Bedside verbal report given to oncoming shift. 0800 - Assessment complete, see summary for details. Resting quietly with eyes closed, easily awakens to voice. C/O chronic generalized discomfort with acute, right wrist and Kingfisher placement site discomfort. Re-positioned for comfort. CI >1.6 with Dobutamine gtt @ mcg. Sa02 >98% on  Room air. Paced rhythm noted @90-91. No acute distress noted. 12 - Dr. Pattie Pimentel at bedside, updated on condition, orders noted. Bedside ECHO initiated. 1300 -Pharmacist Rocio Joseph) updated on PTT result, will continue at current rate and repeat PTT at 1800 today. 1900 - End of Shift Note Bedside shift change report given to Elodia Johnston RN (oncoming nurse) by Maulik Chen RN (offgoing nurse). Report included the following information SBAR, Kardex, Intake/Output, MAR, Recent Results, Cardiac Rhythm Paced and Quality Measures Shift worked:  7a-7p Shift summary and any significant changes:  
  see above Concerns for physician to address:  see above Zone phone for oncoming shift:   N/A Activity: 
Activity Level: Bed Rest 
Number times ambulated in hallways past shift: 0 Number of times OOB to chair past shift: 0 Cardiac:  
Cardiac Monitoring: Yes     
Cardiac Rhythm: Paced Access:  
Current line(s): PIV and central line Genitourinary:  
Urinary status: buckley Respiratory:  
O2 Device: None (Room air) Chronic home O2 use?: NO Incentive spirometer at bedside: NO 
  
GI: 
Last Bowel Movement Date: 04/11/21 Current diet:  DIET DIABETIC CONSISTENT CARB Regular; 60-60-60; AHA-LOW-CHOL FAT Passing flatus: YES Tolerating current diet: NO 
% Diet Eaten: 100 % Pain Management:  
Patient states pain is manageable on current regimen: YES Skin: 
Valdemar Score: 14 Interventions: turn team, speciality bed, float heels and internal/external urinary devices Patient Safety: 
Fall Score: Total Score: 3 Interventions: bed/chair alarm and gripper socks High Fall Risk: Yes Length of Stay: 
Expected LOS: - - - Actual LOS: 4 Lizzette Lucas RN

## 2021-04-11 NOTE — PROGRESS NOTES
1930  Bedside and Verbal shift change report given to Cecily Hudson (oncoming nurse) by Nicole Marcos, GURINDER (offgoing nurse). Report included the following information Kardex, Accordion, Recent Results and Cardiac Rhythm paced. Patient placed on bedpan during dual skin assessment and had a large formed BM. Tolerated activity well. 2030  Patient Rx'd with fentanyl 25 mcg and APAP 650 mg po for 8/10 CMC/left hand pain. Denies pain with IV. Will get order for heating pad. 
 
2100  Patient resting and appears relaxed S/P pain medication. 18 Spoke with Dr. Kristine Israel and reported to him the current dobutamine rate and CI. Clarified to keep CI greater than 1.5 and to maximize effect of dobutamine up to a rate of 10 (eg CI of 3.0 optimal if no ectopy or issue with BP). Plan for MIKE tomorrow and high risk cath on Monday morning. Patient continues to c/o left hand pain but refusing heating pad at this time. 2345  Fentanyl 25 mcg pulled for 6-8/10 left hand pain and \"hurts everywhere\". Brought to bedside and patient declined at this time. 0030  No change in assessment. Patient resting well and reports pain 3/10 at this time. 0430  Labs sent. VSS. Increase in dobutamine drip benefit variable. Patient still fluctuating up and down and drip at 9 at this time. SBP getting softer. Will leave at this time as almost near max rate of 10. 
 
0700  Patient asked, Katya Antonio are you keeping me alive? \". I asked her to clarify and she asked why is she being kept alive and that she had a paper stating what she wanted. I asked her if she had talked with her provider and  regarding her feelings about care and she said that she wanted to. Of note, the patient has a history of intermittent confusion during night shift, but this was right at 7 in the morning and patient was alert and oriented. 0730  Bedside report to Nicole Marcos RN with patient.   Relayed patient's thoughts regarding the patient's current treatment plan.

## 2021-04-12 NOTE — PROGRESS NOTES
Midline insertion procedure note: 
Pt has very limited vascular access. Procedure explained to patient along with risks and benefits. Procedure teaching completed. Pre procedure assessment done. Patient denies questions or concerns at this time. Midline sign over the [address],[place] [address]. Maximum sterile barrier precautions observed throughout procedure. Lidocaine 1% 3ml sc injected to site prior to access the vein. Cannulated LEFT BRACHIAL  vein using ultrasound guidance. Inserted 4.5 Fr. single lumen midline to LEFT arm. Blood return verified and flushed with 20ml normal saline. Sterile dressing applied with Biopatch, StatLock and occlusive dressing as per protocol. Curos caps applied to port. Patient tolerated procedure well with minimal blood loss. Reason for access : Reliable IV access/Labs Complications related to insertion : None NOTE: Patient has Right arm limb alert. See nursing message on StartX for midline reminders. Midline is CT and MRI compatible. Inserted by : Jre Pisano RN [facility] / Vascular Access Nurse Assisted by : Mai Johnston RN, [facility] / Vascular Access Nurse Catheter Length : 15 cm External Length : 5 cm Total Catheter Length: 15  
LEFT arm circumference : 29 cm Catheter occupies 25% of vein. Type of Midline: BARD Ref#: [id number] Lot#: [id number] Expiration Date: 01/31/2022 Informed primary nurse Brittnee Solian, RN midline is ready for use and to hang new infusion tubing prior to use. Jer Pisano RN, BSN, [facility] Vascular Access Nurse

## 2021-04-12 NOTE — PROGRESS NOTES
1900 - Bedside shift change report given to Jose Banks RN (oncoming nurse) by Dayna Sharp RN (offgoing nurse). Report included the following information SBAR, Kardex, Intake/Output, MAR, Recent Results and Cardiac Rhythm Paced. 1957 - Pt complaining of pain 10/10 in R knee requesting pain medicine. Fentanyl 25 mcg given. See MAR for details. 2000 - Shift assessment complete, see flowsheets for details. Pt is A&Ox4, moves all extremities purposefully, pupils brisk and round. Paced on monitor at 90. [place]ham in place in R IJ at 48cm. CO 3 and CI 1.3 currently. Dobutamine infusing at 9 mcg/kg/min. Lung sounds clear and diminished in bases, on room air. ABD soft, BS active. Munroe in place draining yellow/clear urine. L BKA present,popliteal and femoral pulse palpable. All other pulses palpable. Munroe care completed. 2003 - aPTT 54.2, consulted with pharmacy. Heparin rate increased to 13 units/kg/hr and will redraw in 6 hours. 2033 - Carboxy-hgb drawn and SVO2 recalibrated 2314 - Pt continues to complain of R knee pain as well as R wrist/hand pain. Assisted pt to reposition for comfort and PRN fentanyl 25 mcg given. 0000 - Reassessment complete, no changes documented. 0300 - Labs drawn and sent. 0400 - Reassessment complete, no changes documented. Pt bathed with CHG. 0500 - aPTT 65.2, Heparin rate to stay at 13 units/kg/hr End of Shift Note Bedside shift change report given to Jose Banks RN (oncoming nurse) by Cristina Contreras (offgoing nurse). Report included the following information SBAR, Kardex, Intake/Output, MAR, Recent Results and Cardiac Rhythm Paced Shift worked:  8800-4541 Shift summary and any significant changes: N/A Concerns for physician to address:  N/A Zone phone for oncoming shift:   N/A Activity: 
Activity Level: Bed Rest 
Number times ambulated in hallways past shift: 0 Number of times OOB to chair past shift: 0 Cardiac:  
Cardiac Monitoring:  Yes Cardiac Rhythm: Paced Access:  
Current line(s): PICC Genitourinary:  
Urinary status: voiding and buckley Respiratory:  
O2 Device: None (Room air) Chronic home O2 use?: N/A Incentive spirometer at bedside: NO 
  
GI: 
Last Bowel Movement Date: 04/11/21 Current diet:  DIET DIABETIC CONSISTENT CARB Regular; 60-60-60; AHA-LOW-CHOL FAT Passing flatus: YES Tolerating current diet: YES 
% Diet Eaten: 100 % Pain Management:  
Patient states pain is manageable on current regimen: NO 
 
Skin: 
Valdemar Score: 14 Interventions: turn team, float heels, increase time out of bed, internal/external urinary devices and nutritional support Patient Safety: 
Fall Score: Total Score: 3 Interventions: bed/chair alarm High Fall Risk: Yes Length of Stay: 
Expected LOS: - - - Actual LOS: 5 Margi Laguna

## 2021-04-12 NOTE — PROGRESS NOTES
Nephrology Progress Note Monroe Kiser  
 
www. [facility]  Phone - [phone] Patient: Toni Perez    YOB: 1960 Date- 4/12/2021   Admit Date: 4/7/2021 CC: Follow up for parag on ckd IMPRESSION & PLAN:  
· PARAG  - likely due to ATN,CRS from suppressed EF from recent MI and NOE.  
· HYPOKalemia · Iron defi anemia · [place] · NSTEMI. · Cardiogenic shock · Mode to severe MR 
· Hyponatremia due to chf 
· afib - s/p AV node ablation · Anemia - CHRONIC- f/b DR. RUIZ 
· HYPERTENSION · hypertension · H/O LEFT BKA · ckd  3 LIKELY due to DM nephropathy and HTN nephrosclerosis- BL CR. 1.5-1.9 
· H/O Diabetic foot · Proteinuria likely due to DM nephropathy and HTN nephroscl- urine pr/cr 1.5 g/g · Vit d defi PLAN- 
 Continue bumex  kcl 40 meq now  Check bmp in am 
 Avoid hypotension Subjective: Interval History:  
-  Good urine out put > 3300 ml Cr stable 
 k low No sob Objective:  
Vitals:  
 04/12/21 0500 04/12/21 0600 04/12/21 0700 04/12/21 0800 BP: 120/67 127/75 120/67 128/73 Pulse: 90 90 90 90 Resp: 14 17 15 15 Temp: 98.6 °F (37 °C) 98.5 °F (36.9 °C) 98.6 °F (37 °C) 98.6 °F (37 °C) SpO2: 98% 97% 97% 99% Weight:      
Height:      
  
04/11 0701 - 04/12 0700 In: 6745 [P.O.:709; I.V.:1046] Out: 4892 [GIDME:3754] Last 3 Recorded Weights in this Encounter 04/08/21 0300 04/10/21 0557 04/11/21 1328 Weight: 116.5 kg (256 lb 13.4 oz) 112.5 kg (248 lb 0.3 oz) 112.5 kg (248 lb 0.3 oz) Physical exam:  
GEN: NAD NECK- Supple, no mass RESP: No wheezing, Clear b/l CVS: S1,S2  RRR 
NEURO: Normal speech, Non focal 
EXT: + Edema , bka stump + PSYCH: Normal Mood Chart reviewed. Pertinent Notes reviewed. Data Review : 
Recent Labs 04/12/21 
9715 04/11/21 
0425 04/10/21 
6870 * 135* 134* K 3.6 3.8 4.3  104 103 CO2 26 24 20* BUN 47* 45* 45* CREA 2.70* 2.91* 2.94* GLU 104* 127* 242* CA 8.4* 8.5 8.1*  
MG 2.2 2.3 2.3 PHOS 3.9 3.9 4.3 Recent Labs 04/12/21 
0124 04/11/21 
0425 04/10/21 
7486 WBC 7.0 7.3 5.6 HGB 11.3* 11.0* 11.0*  
HCT 36.0 35.2 36.0  200 205 No results for input(s): FE, TIBC, PSAT, FERR in the last 72 hours. Medication list  reviewed Current Facility-Administered Medications Medication Dose Route Frequency  potassium chloride SR (KLOR-CON 10) tablet 40 mEq  40 mEq Oral NOW  atorvastatin (LIPITOR) tablet 10 mg  10 mg Oral DAILY  [Held by provider] apixaban (ELIQUIS) tablet 5 mg  5 mg Oral Q12H  
 magic mouthwash cpd (without sucralfate)  5 mL Oral TID PRN  
 heparin 25,000 units in D5W 250 ml infusion  8-25 Units/kg/hr IntraVENous TITRATE  heparin (porcine) injection 2,000 Units  2,000 Units IntraVENous PRN Or  
 heparin (porcine) injection 4,000 Units  4,000 Units IntraVENous PRN  pantoprazole (PROTONIX) tablet 40 mg  40 mg Oral ACB&D  DOBUTamine (DOBUTREX) 500 mg/250 mL (2,000 mcg/mL) infusion  0-10 mcg/kg/min IntraVENous TITRATE  ticagrelor (BRILINTA) tablet 90 mg  90 mg Oral Q12H  
 [Held by provider] carvediloL (COREG) tablet 3.125 mg  3.125 mg Oral BID WITH MEALS  insulin glargine (LANTUS) injection 10 Units  10 Units SubCUTAneous QHS  [Held by provider] insulin lispro (HUMALOG) injection 5 Units  5 Units SubCUTAneous TIDAC  alcohol 62% (NOZIN) nasal  1 Ampule  1 Ampule Topical Q12H  
 bumetanide (BUMEX) tablet 1 mg  1 mg Oral BID  nitroglycerin (NITROSTAT) tablet 0.4 mg  0.4 mg SubLINGual PRN  
 sodium chloride (NS) flush 5-40 mL  5-40 mL IntraVENous Q8H  
 sodium chloride (NS) flush 5-40 mL  5-40 mL IntraVENous PRN  
 acetaminophen (TYLENOL) tablet 650 mg  650 mg Oral Q6H PRN Or  
 acetaminophen (TYLENOL) suppository 650 mg  650 mg Rectal Q6H PRN  polyethylene glycol (MIRALAX) packet 17 g  17 g Oral DAILY PRN  promethazine (PHENERGAN) tablet 12.5 mg  12.5 mg Oral Q6H PRN Or  
 ondansetron (ZOFRAN) injection 4 mg  4 mg IntraVENous Q6H PRN  
 insulin lispro (HUMALOG) injection   SubCUTAneous AC&HS  
 glucose chewable tablet 16 g  4 Tab Oral PRN  
 dextrose (D50W) injection syrg 12.5-25 g  12.5-25 g IntraVENous PRN  
 glucagon (GLUCAGEN) injection 1 mg  1 mg IntraMUSCular PRN  
 aspirin delayed-release tablet 81 mg  81 mg Oral DAILY  fentaNYL citrate (PF) injection 25 mcg  25 mcg IntraVENous Q2H PRN Taty Smoker, [address] [facility] / [employer] [address] [place], [address] [facility] Phone - [phone]               Fax - [phone]

## 2021-04-12 NOTE — PROGRESS NOTES
Problem: Falls - Risk of 
Goal: *Absence of Falls Description: Document Romeo Shove Fall Risk and appropriate interventions in the flowsheet. Outcome: Progressing Towards Goal 
Note: Fall Risk Interventions: 
Mobility Interventions: Bed/chair exit alarm Medication Interventions: Evaluate medications/consider consulting pharmacy Elimination Interventions: Bed/chair exit alarm Problem: Patient Education: Go to Patient Education Activity Goal: Patient/Family Education Outcome: Progressing Towards Goal 
  
Problem: Pressure Injury - Risk of 
Goal: *Prevention of pressure injury Description: Document Valdemar Scale and appropriate interventions in the flowsheet. Outcome: Progressing Towards Goal 
Note: Pressure Injury Interventions: 
Sensory Interventions: Assess changes in LOC Moisture Interventions: Absorbent underpads Activity Interventions: Pressure redistribution bed/mattress(bed type) Mobility Interventions: Pressure redistribution bed/mattress (bed type) Nutrition Interventions: Document food/fluid/supplement intake Friction and Shear Interventions: Apply protective barrier, creams and emollients Problem: Patient Education: Go to Patient Education Activity Goal: Patient/Family Education Outcome: Progressing Towards Goal 
  
Problem: Risk for Spread of Infection Goal: Prevent transmission of infectious organism to others Description: Prevent the transmission of infectious organisms to other patients, staff members, and visitors. Outcome: Progressing Towards Goal 
  
Problem: Patient Education:  Go to Education Activity Goal: Patient/Family Education Outcome: Progressing Towards Goal 
  
Problem: Patient Education: Go to Patient Education Activity Goal: Patient/Family Education Outcome: Progressing Towards Goal 
  
Problem: Pressure Injury - Risk of 
Goal: *Prevention of pressure injury Description: Document Valdemar Scale and appropriate interventions in the flowsheet. Outcome: Progressing Towards Goal 
Note: Pressure Injury Interventions: 
Sensory Interventions: Assess changes in LOC Moisture Interventions: Absorbent underpads Activity Interventions: Pressure redistribution bed/mattress(bed type) Mobility Interventions: Pressure redistribution bed/mattress (bed type) Nutrition Interventions: Document food/fluid/supplement intake Friction and Shear Interventions: Apply protective barrier, creams and emollients Problem: Patient Education: Go to Patient Education Activity Goal: Patient/Family Education Outcome: Progressing Towards Goal 
  
Problem: Risk for Spread of Infection Goal: Prevent transmission of infectious organism to others Description: Prevent the transmission of infectious organisms to other patients, staff members, and visitors. Outcome: Progressing Towards Goal 
  
Problem: Patient Education:  Go to Education Activity Goal: Patient/Family Education Outcome: Progressing Towards Goal

## 2021-04-12 NOTE — PROGRESS NOTES
[facility] 
 
ICU TEAM Progress Note Name: Lj Rodríguez : 1960 MRN: [id number] Date: 2021 Subjective:  
Progress Note: 2021 Ms Satya Vu is a 64 yo female with a PMH of mitral regurgitation, CAD, HTN, HLD, paroxysmal atrial fibrillation/atrial flutter s/p AV node ablation, sick sinus syndrome s/p pacemaker, AV endocarditis, DM2, CKD4 (baseline Cr 2-2.2), neuropathy, L BKA, GERD, anemia, lymphedema, and gastric emma-en-y. As per chart review, she presented to [facility] as a transfer from [facility] on . At the OSH, she had been treated for chest pain, NSTEMI, and HF. Troponin peaked at 55 and downtrended to 28. TTE from [facility] showed EF 10-15% (prior normal EF). Repeat TTE pending at [facility]. At [facility], she has been seen an evaluated by Dr Bennie Cruz. Of note, patient has allergies to contrast (noted in allergy list). S/P: Procedure(s): LEFT AND RIGHT HEART CATH / CORONARY ANGIOGRAPHY Aortagram Abdominal W Runoff SUBJ: 
NAD. No complaints. Cognition intact Active Problem List:  
 
Problem List  Date Reviewed: 3/1/2021 Codes Class S/P atrioventricular rolando ablation ICD-10-CM: Q66.596 ICD-9-CM: V45.89 S/P cardiac cath ICD-10-CM: Z98.890 ICD-9-CM: V45.89 Overview Signed 2021  1:01 PM by Marty Vinson NP  
  2021: cardiac cath showed MVD. Mod/severe MR, elevated PA pressures CHF (congestive heart failure) (HCC) ICD-10-CM: I50.9 ICD-9-CM: 428.0   
   
 NSTEMI (non-ST elevated myocardial infarction) ([facility] [address] ICD-10-CM: I21.4 ICD-9-CM: 410.70 Gastroesophageal reflux disease without esophagitis ICD-10-CM: K21.9 ICD-9-CM: 530.81 Acute respiratory failure (HCC) ICD-10-CM: J96.00 
ICD-9-CM: 518.81   
   
 UTI (urinary tract infection) ICD-10-CM: N39.0 ICD-9-CM: 599.0 Pneumonia ICD-10-CM: J18.9 ICD-9-CM: 073  SOB (shortness of breath) ICD-10-CM: R06.02 
ICD-9-CM: 786.05   
   
 CKD (chronic kidney disease) ICD-10-CM: N18.9 ICD-9-CM: 569. 9 Anticoagulated ICD-10-CM: Z79.01 
ICD-9-CM: V58.61 PARAG (acute kidney injury) ([facility] ICD-10-CM: N17.9 ICD-9-CM: 326. 9 Fluid overload ICD-10-CM: E87.70 ICD-9-CM: 276.69 Subtherapeutic international normalized ratio (INR) ICD-10-CM: R79.1 ICD-9-CM: 790.92 Suspected COVID-19 virus infection ICD-10-CM: D26.969 ICD-9-CM: V01.79 Chest pain ICD-10-CM: R07.9 ICD-9-CM: 786.50 Left below-knee amputee [facility]) ICD-10-CM: J59.370 ICD-9-CM: V49.75   
   
 H/O bilateral mastectomy ICD-10-CM: Z90.13 ICD-9-CM: V45.71 Foot deformity, right ICD-10-CM: M21.961 ICD-9-CM: 736.70 Pes cavus ICD-10-CM: Q66.70 ICD-9-CM: 736.73 Diabetic polyneuropathy associated with type 2 diabetes mellitus ([facility] ICD-10-CM: E11.42 
ICD-9-CM: 250.60, 357.2 Morbid obesity with BMI of 40.0-44.9, adult (HCC) ICD-10-CM: E66.01, Z68.41 
ICD-9-CM: 278.01, V85.41 Controlled type 2 diabetes mellitus with stage 4 chronic kidney disease, with long-term current use of insulin (HCC) ICD-10-CM: E11.22, N18.4, Z79.4 ICD-9-CM: 250.40, 585.4, V58.67 PAF (paroxysmal atrial fibrillation) (HCC) ICD-10-CM: I48.0 ICD-9-CM: 427.31 Anemia in stage 4 chronic kidney disease (HCC) ICD-10-CM: N18.4, D63.1 ICD-9-CM: 285.21, 585.4 Essential hypertension ICD-10-CM: I10 
ICD-9-CM: 401.9 Systolic murmur QHA-62-DV: R01.1 ICD-9-CM: 785.2 CKD (chronic kidney disease), stage IV ([facility] [address] ICD-10-CM: N18.4 ICD-9-CM: 585.4 Overview Addendum 3/12/2013  8:11 AM by Lex Membreno. Acute on chronic, Dr. Mack Jimenez Mixed hyperlipidemia ICD-10-CM: E78.2 ICD-9-CM: 272.2 Long term (current) use of anticoagulants ICD-10-CM: Z79.01 
ICD-9-CM: V58.61 S/P cardiac pacemaker procedure ICD-10-CM: Z95.0 ICD-9-CM: V45.01  Overview Addendum 6/12/2020 2:12 PM by Ayla Rey MD  
  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 Sick sinus syndrome (HCC) (Chronic) ICD-10-CM: I49.5 ICD-9-CM: 427.81 Overview Signed 4/2/2012  1:37 PM by Scott Joseph NP With 5 second and greater pauses, dual chamber pacemaker placement and loop recorder explant done>medtronic device Status post ablation of atrial fibrillation (Chronic) ICD-10-CM: Z98.890, Z86.79 
ICD-9-CM: V45.89 Overview Signed 12/15/2011  9:23 AM by Marvin Khan NP  
  12/14/11 PVI ablation of atrial fibrillation Fe deficiency anemia ICD-10-CM: D50.9 ICD-9-CM: 280.9 Overview Signed 4/23/2010 12:50 PM by Sophy Garces. EGD and Colonoscopy neg latter repeat in 10 years History of breast cancer ICD-10-CM: Z85.3 ICD-9-CM: V10.3 Overview Addendum 6/11/2018  4:11 PM by Ayla Rey MD  
  1998, right modified radical mastectomy  0/11 nodes, + est and pro receptors. chemotherapy x 4 `1999 2008 left cancer with mastectomy and bilateral reconstruction using own tissues. Asthma ICD-10-CM: B70.532 ICD-9-CM: 493.90 Overview Signed 4/13/2010  9:20 AM by Sophy Garces.  
  infectious Past Medical History:  
 
 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 She also has no past medical history of Adverse effect of anesthesia, Difficult intubation, Malignant hyperthermia due to anesthesia, or Pseudocholinesterase deficiency. Past Surgical History:  
 
 has a past surgical history that includes pr laminectomy,cervical (1994); pr gastric bypass,obese<150cm emma-en-y (7/08); pr anesth,knee area surgery (1982 AND 1994); hx cervical fusion (1994); hx dilation and curettage (1992); hx carpal tunnel release; hx mastectomy (1998); ir insert tunl cvc w port over 5 years; pr trabeculoplasty by laser surgery; pr needle biopsy liver,w othr proc (8.21.07); us guide asp ovarian cyst abd approach (8.21.07); hx afib ablation; hx pacemaker (11/17/2020); hx mohs procedure (1995); pr abdomen surgery proc unlisted; hx orthopaedic (Right); hx mastectomy (Left); hx pacemaker; hx breast reconstruction (Bilateral); pr relocation of skin pocket for pacemaker (N/A, 4/24/2020); pr rmvl transvns pm eltrd 1 lead sys atr/ventr (N/A, 4/24/2020); pr rmvl transvns pm eltrd 1 lead sys atr/ventr (N/A, 4/27/2020); ir insert tunl cvc w/o port over 5 yr (5/4/2020); ir remove tunl cvad w/o port / pump (6/26/2020); ir insert tunl cvc w/o port over 5 yr (9/8/2020); hx amputation foot (Left, 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 Home Medications:  
 
Prior to Admission medications Medication Sig Start Date End Date Taking? Authorizing Provider  
cholecalciferol (Vitamin D3) (1000 Units /25 mcg) tablet Take 1,000 Units by mouth daily. Yes Provider, Historical  
vitamin a-vitamin c-vit e-min (OCUVITE) tablet Take 1 Tab by mouth daily.    Yes Provider, Historical  
cyanocobalamin (Vitamin B-12) 500 mcg tablet Take 500 mcg by mouth daily. Yes Provider, Historical  
pantoprazole (PROTONIX) 40 mg tablet Take 1 Tab by mouth Before breakfast and dinner. 20  Yes Vidya Waters MD  
sertraline (ZOLOFT) 100 mg tablet Take 100 mg by mouth daily. 10/30/20  Yes Provider, Historical  
sertraline (ZOLOFT) 25 mg tablet Take 25 mg by mouth daily. Take with 100 mg tablet every morning 10/6/20  Yes Provider, Historical  
busPIRone (BUSPAR) 10 mg tablet TAKE ONE TABLET BY MOUTH TWICE A DAY 19   Easton Wade MD  
 
 
Allergies/Social/Family History: Allergies Allergen Reactions  Avapro [Irbesartan] Unable to Obtain  Bactrim [Sulfamethoxazole-Trimethoprim] Other (comments) Sore mouth  Contrast Agent [Iodine] Itching [address]  Morphine Nausea and Vomiting and Swelling  Penicillins Hives Did not tolerate cefepime 3/2020  Pravachol [Pravastatin] Nausea Only  Seafood [Shellfish Containing Products] Hives  Singulair [Montelukast] Other (comments)  
  palpitations  Ace Inhibitors Cough  Amlodipine Swelling  Captopril Cough  Carvedilol Diarrhea Social History Tobacco Use  Smoking status: Never Smoker  Smokeless tobacco: Never Used Substance Use Topics  Alcohol use: Not Currently Family History Problem Relation Age of Onset  Cancer Mother  Heart Disease Mother  Alcohol abuse Father  Diabetes Brother  Hypertension Brother Review of Systems:  
 
Pertinent items are noted in HPI. Objective:  
Vital Signs: 
Visit Vitals /60 Pulse 91 Temp 98.9 °F (37.2 °C) Resp 17 Ht 5' 10\" (1.778 m) Wt 104.2 kg (229 lb 11.5 oz) SpO2 99% BMI 32.96 kg/m² O2 Flow Rate (L/min): 2 l/min O2 Device: None (Room air) Temp (24hrs), Av.8 °F (37.1 °C), Min:98.5 °F (36.9 °C), Max:99.1 °F (37.3 °C) CVP (mmHg): 9 mmHg (21 1700) Intake/Output:  
 
Intake/Output Summary (Last 24 hours) at 4/12/2021 1803 Last data filed at 4/12/2021 1500 Gross per 24 hour Intake 1051.8 ml Output 2880 ml Net -1828.2 ml Physical Exam: 
 
General: NAD, + F/C Eye:  PERRLA Neurologic: Alert and oriented x3 Neck:  Supple, no JVD Lungs:  CTAB Heart:  RRR, + murmur, 2+ pulses Abdomen: soft, nontender, nondistended Ext: L BKA 
 
LABS AND  DATA: Personally reviewed Recent Labs 04/12/21 
6765 04/11/21 
0425 WBC 7.0 7.3 HGB 11.3* 11.0*  
HCT 36.0 35.2  200 Recent Labs 04/12/21 
6978 04/11/21 
0425 * 135* K 3.6 3.8  104 CO2 26 24 BUN 47* 45* CREA 2.70* 2.91* * 127* CA 8.4* 8.5 MG 2.2 2.3 PHOS 3.9 3.9 Recent Labs 04/12/21 
2622 04/11/21 
0425 AP 74 73  
TP 5.8* 5.6* ALB 2.4* 2.3*  
GLOB 3.4 3.3 Recent Labs 04/12/21 
1020 04/12/21 
0313 APTT 67.7* 65.2* No results for input(s): PHI, PCO2I, PO2I, FIO2I in the last 72 hours. No results for input(s): CPK, CKMB, TROIQ, BNPP in the last 72 hours. RA 
 
MEDS: Reviewed Chest X-Ray: personally reviewed and report checked- No evidence of acute cardiopulmonary process. · LV: Estimated LVEF is 20 - 25%. Normal cavity size. Moderate concentric hypertrophy. Severely reduced systolic function. · RV: Dilated right ventricle. Mildly reduced systolic function. Pacing wire present · MV: Mitral valve non-specific thickening. Mild mitral annular calcification. Moderate to severe mitral valve regurgitation is present. · TV: Mild tricuspid valve regurgitation is present. · PA: Moderate pulmonary hypertension. Pulmonary arterial systolic pressure is 47 mmHg. · RA: Mildly dilated right atrium. Assessment and Plan:  
 
NSTEMI: with multivessel disease, not a candidate for CABG per CTS. On dobutamine gtt for decompensated heart failure Management per cards, continue Frosty Callahan Plan for high risk PCI per cards 04/13 Cont heparin gtt Acute decompensated heart failure with cardiogenic shock. Continue dobutamine drip Continue diuresis as tolerated Closely monitor hemodynamics, has PA catheter Paroxysmal AF Rate controlled. Apixaban on hold presently PARAG on CKD4. Cr slightly improved 04/12 Cardiorenal syndrome Nephrology following Elevated LFTs - likely hepatic congestion DM2: Lantus; Lispro w meals; SSI HLD: Holding statin due to transaminitis Anxiety: Zoloft Deconditioning: PT/OT when appropriate; c/b hx of BKA w poorly fitting prosthesis Her prognosis is very guarded. She is deemed a very poor candidate for CABG. Hemodynamic mgmt per cardiology directed by PA cath. Planned PCI 04/13. Nephrology following CRITICAL CARE CONSULTANT NOTE I had a face to face encounter with the patient, reviewed and interpreted patient data including clinical events, labs, images, vital signs, I/O's, and examined patient. I have discussed the case and the plan and management of the patient's care with the consulting services, the bedside nurses and the respiratory therapist.   
 
 
 
Samia Talley MD 
[phone] 4/12/2021

## 2021-04-12 NOTE — PROGRESS NOTES
[address]  [phone] Cardiology Progress Note 4/12/2021 0915 AM  
 
Admit Date: 4/7/2021 Admit Diagnosis:  
Chest pain [R07.9] NSTEMI (non-ST elevated myocardial infarction) ([facility] [address] [I21.4] CHF (congestive heart failure) ([facility] [address] [I50.9] Subjective:  
 
Nelsy Rodriguez continues to endorse mid sternal chest pain, non-radiating, points to the center of her chest below her sternum. She denies shortness of breath. Mixed venous oxygen saturation this megan is 59% and cardiac index 1.8 on rounds today. PA pressures in the 40s over low 20s. SVR 1500. Please titrate dobutamine to MAP greater than 65, Maintain PA pressures around 40/20's. Visit Vitals /76 Pulse 90 Temp 98.7 °F (37.1 °C) Resp 22 Ht 5' 10\" (1.778 m) Wt 112.5 kg (248 lb 0.3 oz) SpO2 100% BMI 35.59 kg/m² Current Facility-Administered Medications Medication Dose Route Frequency  potassium bicarb-citric acid (EFFER-K) tablet 40 mEq  40 mEq Oral NOW  atorvastatin (LIPITOR) tablet 10 mg  10 mg Oral DAILY  [Held by provider] apixaban (ELIQUIS) tablet 5 mg  5 mg Oral Q12H  
 magic mouthwash cpd (without sucralfate)  5 mL Oral TID PRN  
 heparin 25,000 units in D5W 250 ml infusion  8-25 Units/kg/hr IntraVENous TITRATE  heparin (porcine) injection 2,000 Units  2,000 Units IntraVENous PRN Or  
 heparin (porcine) injection 4,000 Units  4,000 Units IntraVENous PRN  pantoprazole (PROTONIX) tablet 40 mg  40 mg Oral ACB&D  DOBUTamine (DOBUTREX) 500 mg/250 mL (2,000 mcg/mL) infusion  0-10 mcg/kg/min IntraVENous TITRATE  ticagrelor (BRILINTA) tablet 90 mg  90 mg Oral Q12H  
 [Held by provider] carvediloL (COREG) tablet 3.125 mg  3.125 mg Oral BID WITH MEALS  insulin glargine (LANTUS) injection 10 Units  10 Units SubCUTAneous QHS  [Held by provider] insulin lispro (HUMALOG) injection 5 Units  5 Units SubCUTAneous TIDAC  alcohol 62% (NOZIN) nasal  1 Ampule  1 Ampule Topical Q12H  
 bumetanide (BUMEX) tablet 1 mg  1 mg Oral BID  nitroglycerin (NITROSTAT) tablet 0.4 mg  0.4 mg SubLINGual PRN  
 sodium chloride (NS) flush 5-40 mL  5-40 mL IntraVENous Q8H  
 sodium chloride (NS) flush 5-40 mL  5-40 mL IntraVENous PRN  
 acetaminophen (TYLENOL) tablet 650 mg  650 mg Oral Q6H PRN Or  
 acetaminophen (TYLENOL) suppository 650 mg  650 mg Rectal Q6H PRN  polyethylene glycol (MIRALAX) packet 17 g  17 g Oral DAILY PRN  promethazine (PHENERGAN) tablet 12.5 mg  12.5 mg Oral Q6H PRN Or  
 ondansetron (ZOFRAN) injection 4 mg  4 mg IntraVENous Q6H PRN  
 insulin lispro (HUMALOG) injection   SubCUTAneous AC&HS  
 glucose chewable tablet 16 g  4 Tab Oral PRN  
 dextrose (D50W) injection syrg 12.5-25 g  12.5-25 g IntraVENous PRN  
 glucagon (GLUCAGEN) injection 1 mg  1 mg IntraMUSCular PRN  
 aspirin delayed-release tablet 81 mg  81 mg Oral DAILY  fentaNYL citrate (PF) injection 25 mcg  25 mcg IntraVENous Q2H PRN Objective:  
  
Physical Exam: 
Gen: chronically ill appearing  female, ashen/yellow appearance NAD Abdomen: soft, non-tender. Bowel sounds normal, obese Extremities: left leg BKA, right leg trace edema, discolored. Right radial and right femoral sites CDI. Heart: regular rate and rhythm, no murmur, S3/JVD Lungs: diminished, fewer crackles bilateral posterior bases Neck: supple, symmetrical, trachea midline Neurologic: alert and oriented x 4, calm Data Review:  
Recent Labs 04/12/21 
6673 04/11/21 
0425 04/10/21 
5313 WBC 7.0 7.3 5.6 HGB 11.3* 11.0* 11.0*  
HCT 36.0 35.2 36.0  200 205 Recent Labs 04/12/21 
0888 04/11/21 
0425 04/10/21 
9182 * 135* 134* K 3.6 3.8 4.3  104 103 CO2 26 24 20* * 127* 242* BUN 47* 45* 45* CREA 2.70* 2.91* 2.94* CA 8.4* 8.5 8.1*  
MG 2.2 2.3 2.3 PHOS 3.9 3.9 4.3 ALB 2.4* 2.3* 2.5* TBILI 1.4* 1.4* 1.5* ALT 16 16 21 No results for input(s): TROIQ, CPK, CKMB in the last 72 hours. Intake/Output Summary (Last 24 hours) at 4/12/2021 2337 Last data filed at 4/12/2021 0600 Gross per 24 hour Intake 1429.95 ml Output 2940 ml Net -1510.05 ml Cardiographics 
  Telemetry: Paced ECG: Paced, no acute changes Echocardiogram: 3/1/2021 · LV: Estimated LVEF is 50 - 55%. Visually measured ejection fraction. Normal cavity size. Upper normal wall thickness. Low normal systolic function. · MV: Mitral annular calcification. Mild to moderate mitral valve regurgitation is present. · TV: Mild to moderate tricuspid valve regurgitation is present. · PA: Pulmonary arterial systolic pressure is 44 mmHg. · Image quality for this study was technically difficult. New ECHO 4/7/2021:  
· LV: Estimated LVEF is 20 - 25%. Normal cavity size. Moderate concentric hypertrophy. Severely reduced systolic function. · RV: Dilated right ventricle. Mildly reduced systolic function. Pacing wire present · MV: Mitral valve non-specific thickening. Mild mitral annular calcification. Moderate to severe mitral valve regurgitation is present. · TV: Mild tricuspid valve regurgitation is present. · PA: Moderate pulmonary hypertension. Pulmonary arterial systolic pressure is 47 mmHg. · RA: Mildly dilated right atrium. Repeat echo 4/11/2021 on dobutamine: 
· LV: Estimated LVEF is 25 - 30%. Normal cavity size. Moderate concentric hypertrophy. Moderate-to-severely reduced systolic function. Pacemaker. . 
· LA: Moderately dilated left atrium. · RV: Dilated right ventricle. Borderline low systolic function. Pacing wire present · RA: Moderately dilated right atrium. · AV: Mild aortic valve focal thickening present. · MV: Mitral valve non-specific thickening. Mild mitral annular calcification. Mild to moderate mitral valve regurgitation is present. · TV: Mild tricuspid valve regurgitation is present.  
· PA: Moderate pulmonary hypertension. Pulmonary arterial systolic pressure is 52 mmHg. CXRAY: \"pulmonary vascular congestion without overt pulmonary edema\" Assessment:  
 
Active Problems: 
  Chest pain (11/23/2020) CHF (congestive heart failure) ([facility] [address] (4/7/2021) NSTEMI (non-ST elevated myocardial infarction) ([facility] [address] (4/7/2021) S/P cardiac cath (4/8/2021) Overview: 4/8/2021: cardiac cath showed MVD. Mod/severe MR, elevated PA pressures Plan:  
Chance Rabago is a 63 y.o. female with a PMH significant for chronic PAF, right arm lymphadema (s/p lymphectomy), bilateral mastectomy for breast CA, HTN. HLD, neuropathy, left BKA, SSS, DM II, CKD, Asthma, A-flutter, long term use of OAC, endocarditis  admitted for Chest pain [R07.9] NSTEMI (non-ST elevated myocardial infarction) ([facility] [address] [I21.4] CHF (congestive heart failure) ([facility] [address]    
 
NSTEMI: s/p cardiac cath which showed MVD, mod/severe MR, and PHTN, cardiogenic shock due to ischemic cardiomyopathy: 
Troponin peaked at 55, trended down to 28, no further trending necessary. Chest pain intermittent Cardiac index improved from 1.1- Mixed venous oxygen saturation is 59% and cardiac index 1.8 on rounds today. PA pressures in the 40s over low 20s. ECHO showed new onset ICM; EF 20-25%. Repeat on dobutamine 4/11/2021 showed EF of 25-30, mitral regurgitation reduced to mild to moderate. Patient declined for cardiac surgery due to prohibitively high riskinitiate Brilinta in preparation for possible high risk PCI when optimized The patient understands she is critically ill and has very serious heart problems and is extremely high risk of dying if we do nothing And we discussed that Dr. Cameron Bauer and I feel that the risk of death with CABG is higher than that with PCI She does understand a significantly higher than average risk of contrast-induced nephropathy that could progress to end-stage renal disease requiring dialysis, and a higher than average risk of myocardial infarction and death with her severe ischemic cardiomyopathy with cardiogenic shock currently on pressors. I discussed the risks and benefits of cardiac catheterization and PCI plus or minus Impella catheter with the patient who expressed understanding and wishes to proceed. Plan for cardiac catheterization tomorrow, needs pre-medicating for IV contrast allergy. Moderate to severe mitral regurgitation: 
Eventual MIKE for further assessment of MR, but held on Friday due to borderline hypotension and the fact that she is not a candidate for surgery 
  
Acute on chronic systolic HF, with cardiogenic shock ECHO result as above Continue bumex 1 mg PO BID (as per nephrology), Coreg and ACE inhibitor/ARB/Entresto contraindicated due to cardiogenic shock, currently on dobutamine. Please titrate to MAP equal to 65. CI 1.6-2 is ok. Diuresing well with about 5 L negative, creatinine stable, down to 2.7 Parox A-fib s/p AV node ablation   
Hold [address] [place] [address] pending decision on Glenn catheter and/or ultrafiltration, currently on IV heparin Coreg held with cardiogenic shock on dobutamine 
  
Hypertension: controlled Continue diuretic, monitor with dobutamine wean  
  
PARAG on Chronic kidney disease stage IV : baseline 2-2.1 Renal function improved with dobutamine and improved cardiac output. Avoid nephrotoxic substances Follow BMP-Cr 2.7 Nephrology following, diuresing for congestion, may need UF 
  
DM II: 
Manage per internal medicine  
  
HLD:  
LDL 44 on 4/11/2021 Starting low-dose statin due to three-vessel disease Noted his history of questionable nausea only with pravastatin in 2010, and that statin may have been held due to elevated LFTs, but LFTs are currently normal 
  
Anxiety:   
Continue Zoloft  
  
Chronic pain: 
Management as per pulmonary critical care and internal medicine Ba Duran [id number],PV 
DNP,APRN,AG-ACNP-BC Patient seen and examined by me with the above nurse practitioner. I personally performed all components of the history, physical, and medical decision making and agree with the assessment and plan with minor modifications as noted. Today the patient feeling fine, denied chest pain to me. General PE Gen:  NAD Mental Status - Alert. General Appearance - Not in acute distress. HEENT: 
PERRL, no carotid bruits or JVD Chest and Lung Exam  
Inspection: Accessory muscles - No use of accessory muscles in breathing. Auscultation:  
Breath sounds: - Normal.  
Cardiovascular Inspection: Jugular vein - Bilateral - Inspection Normal.  
Palpation/Percussion:  
Apical Impulse: - Normal.  
Auscultation: Rhythm - Regular. Heart Sounds - S1 WNL and S2 WNL. No S3 or S4. Murmurs & Other Heart Sounds: Auscultation of the heart reveals - No Murmurs. Peripheral Vascular Upper Extremity: Inspection - Bilateral - No Cyanotic nailbeds or Digital clubbing. Lower Extremity:  
Palpation: Edema - Bilateral - No edema. Abdomen:   Soft, non-tender, bowel sounds are active. Neuro: A&O times 3, CN and motor grossly WNL Renal function continues to improve. Patient denied any chest pain to me. I believe patient is nearing optimization, but will try to transition to milrinone today now that blood pressure is consistently improved. Plan for high risk PCI tomorrow.

## 2021-04-12 NOTE — PROGRESS NOTES
Interdisciplinary team rounds were held 4/12/2021 with the following team members:Care Management, Diabetes Treatment Specialist, Nursing, Nutrition, Pharmacy, Physical Therapy, Physician and Respiratory Therapy. Plan of care discussed. See clinical pathway and/or care plan for interventions and desired outcomes.

## 2021-04-13 NOTE — PROGRESS NOTES
0700: Report received from Victor Hugo Laguerre, [facility]. 
 
0730: Heparin infusion now restarted per [facility] and pharmacy at 10 units. Milrinone and Dobutamine also infusing to keep a MAP greater than 65. See MAR for all rates and titrations. 0745: Pt. Assessed. Pt. Alert and oriented X4. Pt. Very talkative and demanding at times. Pt. Refusing to be turned. Pulses all palpable. Left BKA present, popliteal palpable. Breath sounds diminished in the bases. Pt. On room air. Bowel sounds active. Pt. NPO. BM yesterday. Swanz, Midline, and buckley present. All in use. UOP adequate. CVP 3. CO, CI, and SVO2 all WDL's. PAP values slightly high. Wittkamp aware. Sacrum pink but blanchable. Bruising to BL lower arms. Pt. NSR/Sinus arrhythmia on the monitor. 0800: Cath lab team at the bedside. PO meds given to patient per Cath lab request. Some PO meds held. See MAR. Dr. Rosie Hernandez updated on patient condition. Dobutamine placed at 7 mcgs for MAP less than 65.  
 
0805: Pt. Now off the floor and to the cath lab with cath lab team.  
 
1315: Report received from Cath lab. Pt. Now on her way back to her CCU room. 1349: Pt. Reassessed. Pt. Responds to voice. Pt. Still coming off sedation. Sleepy but follows commands and oriented to self and place. VSS. Dobutamine and milrinone infusing. Heparin off, Angiomax on. Will infuse until 1420. Cath lab to pull sheaths at 1620. Pt. PACED on the monitor. BP stable. A line in femoral sheath. Pulses all palpable. Temp slightly low, Warm blankets applied. 1431: Angiomax now stopped. Cath lab access sites WDL's, pulses palpable. 1600: Dr. Rosie Hernandez called in regards to high PAP's and CVP. Was told to monitor for now. Can give Bumex dose as ordered if Respiratory status worsens. 1710: BL sheaths now removed at the bedside by cath lab nurses. Pressure held. Femstop applied now. Pt. Alert and oriented X3. Pt. Denies pain. Will assess site every 15 minutes per policy.   
 
1829: PRN Fentanyl now given for pain. Pt. Having pain from right groin. Swelling now noted below femstop. Femstop slowly taken down for good visual assessment. Hematoma now noted. Skin Marked. Hand held pressure now started, as well as placing the Femstop immediatly back on.  
 
1835: Dr. Shanna Edwards now called to the bedside. H&H ordered as well as type and screen. Dr. Chester Lundborg holding pressure to Hematoma, Pulses still palpable. Pt. Becoming hypotensive. 1844: Dobutamine placed at 9 mcgs for hypotension. 1845: Dr. Kenneth Ribeiro called and updated. Dr. Mark Anthony Bar to see patient tonight. IVCU nurse and cath lab nurse called to the bedside Via Dr. Kenneth Ribeiro. Manual pressure held and well as Femstop in place. 1850: Dobutamine now at 10 mcgs for hypotension. 1858: Levophed now started for hypotension per Dr. Chester Lundborg. 1 Liter of LR now given per Dr. Chester Lundborg.  
 
1915: Per Dr. Kenneth Ribeiro, give 2 units of blood ASAP and use vasopressin as needed. Awaiting orders for both. Femstop still in place. 1945: Dr. Mark Anthony Bar at the bedside, [facility] removed by MD. Consent signed by patient and MD for Right Groin exploration this evening. Per Dr. Mark Anthony Bar, leave femstop off. No more manual pressure applied. Report given to Mirella Toro RN.

## 2021-04-13 NOTE — PROGRESS NOTES
I was notified by nurse Shonda Glass that the patient after sheath pull had an actively expanding hematoma in the right groin. FemoStop had been placed. I told her to go back and hold immediate manual pressure, and call to Anuja from the [facility] to come assist.  [address] who happened to be in the hospital for another procedure to come assist.  Linda Mcnair who is the most experienced sheath puller notified me that this was a serious hematoma was expanding and thought that vascular surgery should be consulted. I told him to continue holding pressure. Dr. Ann Vogel of vascular surgery consulted by cell phone. Hemoglobin was drawn and was down from 11.1-9.5. Blood pressure was transiently in the upper 60s, and Levophed was initiated. I ordered bolus Ramiro-Synephrine as needed to get blood pressure above 90 and vasopressin to be available. For fear that there was rapid bleeding that was not reflected by the current hemoglobin value, I ordered 2 units of blood from the blood bank and to keep to ahead. Lucila Chuer to keep me posted.

## 2021-04-13 NOTE — PROGRESS NOTES
Interdisciplinary team rounds were held 4/13/2021  with the following team members:Care Management, Diabetes Treatment Specialist, Nursing, Nutrition, Palliative Care, Pharmacy, Physician and Respiratory Therapy. Plan of care discussed. Goal: See MD orders and progress notes for further  interventions and desired outcomes.

## 2021-04-13 NOTE — PROGRESS NOTES
Patient received in recovery. Will monitor until case is continued. Patient is AO x 4, VSS, sheaths in R/L groin.

## 2021-04-13 NOTE — PROGRESS NOTES
1900 - Bedside shift change report given to Jean-Pierre Barlow RN (oncoming nurse) by Kassie Emery RN (offgoing nurse). Report included the following information SBAR, Kardex, Intake/Output, MAR, Recent Results and Cardiac Rhythm Paced. 2000 -  Shift assessment complete, see flowsheets for details. Pt is A&Ox4, moves all extremities purposefully, pupils brisk and round. Paced on monitor at 90. [place] in place in R IJ at 48cm. Dobutamine infusing at 5 mcg/kg/min and milrinone infusing at 0.25mcg/mL. Lung sounds clear and diminished in bases, on room air. ABD soft, BS active. Buckley in place draining yellow/clear urine. L BKA present,popliteal and femoral pulse palpable. All other pulses palpable. Buckley care completed. 0000 - Reassessment complete, no changes documented. 0200 - Bathed with CHG and linen changed. 0400 - Reassessment complete, no changes documented. Labs drawn and sent. 0973 - Dobutamine titrated to 6 mcg/kg/min for goal of MAP >65, see MAR for details. 0530 - Pt refusing to be turned, only wanting to be pulled up in bed.  
 
0630 - aPTT 94.3, spoke with pharmacy will hold Heparin infusion for a hour then restart at 10 units/kg/hr. End of Shift Note Bedside shift change report given to Say Johnson RN (oncoming nurse) by Betty Smith (offgoing nurse). Report included the following information SBAR, Kardex, Intake/Output, MAR, Recent Results and Cardiac Rhythm Paced Shift worked:  [id number] Shift summary and any significant changes:  
  See above Concerns for physician to address:  N/A Zone phone for oncoming shift:   N/A Activity: 
Activity Level: Bed Rest 
Number times ambulated in hallways past shift: 0 Number of times OOB to chair past shift: 0 Cardiac:  
Cardiac Monitoring: Yes     
Cardiac Rhythm: Paced Access:  
Current line(s): central line and midline Genitourinary:  
Urinary status: voiding and buckley Respiratory:  
O2 Device: None (Room air) Chronic home O2 use?: N/A Incentive spirometer at bedside: NO 
  
GI: 
Last Bowel Movement Date: 04/12/21 Current diet:  DIET NUTRITIONAL SUPPLEMENTS Breakfast, Dinner; [place] DIET NUTRITIONAL SUPPLEMENTS Lunch; Glucerna Shake DIET NPO With Meds Passing flatus: YES Tolerating current diet: YES 
% Diet Eaten: 100 % Pain Management:  
Patient states pain is manageable on current regimen: NO 
 
Skin: 
Valdemar Score: 15 Interventions: turn team, float heels, increase time out of bed, internal/external urinary devices and nutritional support Patient Safety: 
Fall Score: Total Score: 3 Interventions: bed/chair alarm High Fall Risk: Yes Length of Stay: 
Expected LOS: 4d 2h Actual LOS: 6 Faisal Johnson

## 2021-04-13 NOTE — PROGRESS NOTES
Monroe Margi Coleman  [id number]   :1960 Patient is in cath labs Labs reviewed-- cr stable We will follow Federico Ferrer [facility] [employer] [facility] [address] [place], [address] [place] Phone - [phone] Fax - [phone] [facility] [facility] [address], [address] [facility] Phone - [phone] Fax - [phone]    
www. [facility]

## 2021-04-13 NOTE — PROGRESS NOTES
Patient returned call & confirmed need of Ajovy 225 mg/1.5 mL #1.5 mL /28 days  refill. Verified 2 patient identifiers. Dosage confirmed. $0.00 copay in 004. Payment info on file confirmed.Patient has not started any new medications including OTC drugs. Patient has not had any medication/ dose or instruction changes. No new allergies or side effects reported with this shipment. Medication is being taken as prescribed by physician and properly stored. Last dose administered on 05/15. Next dose due on 06/12. Medication will ship 06/09 for 06/10 delivery. Delivery address on file verified. Patient voiced understanding & has no questions or concerns at this time. Declined [facility] consult....ELVIN   SHEATH PULL NOTE: 
 
Patient informed of procedure with questions answered with review. 6 fr sheath in RFA pulled by GURINDER Pang. Hand hold and quick clot, with manual compression to site. Handhold for 17 minutes. No bleeding, no hematoma, no pain at site post hemostasis. Femstop applied at 100 at 1725, ordered for 3hrs. Second sheath, 4fr sheath in LFA pulled by Breanna Rapp. Hand hold and quick clot, with manual compression to site. Hand hold for 12 minutes. No bleeding, no hematoma, no pain at site post hemostasis. Patient tolerated well. No change in status. Groin instructions provided with review. Continue to monitor procedure site and patient status. *Advised patient to keep head flat and extremity flat to decrease risk of bleeding. *Recommended that patient not drink for ONE HOUR post sheath pull completion. *Recommended that patient not eat for TWO HOURS post sheath pull completion. *Instructed patient on rationale for delay of PO products to decrease risk for aspiration and if additional treatment to procedure site is required. Patient verbalized understanding of instructions with review.

## 2021-04-13 NOTE — PROGRESS NOTES
[address] [place] [address]  [phone] Cardiology Progress Note 4/13/2021 1320 PM 
 
Admit Date: 4/7/2021 Admit Diagnosis:  
Chest pain [R07.9] NSTEMI (non-ST elevated myocardial infarction) ([facility] [I21.4] CHF (congestive heart failure) ([facility] [I50.9] Subjective:  
 
Petros Emanuel is arousable, but still recovering from sedation for cardiac cath. Mixed venous oxygen saturation this afternoon is 80% and cardiac index 2.2 on rounds today. . S/p PCI/ADDIS to mid LAD. Hemodynamics stable on milrinone at 0.25 and 7 mcg of dobutamine. Labs steady. VSS Visit Vitals BP (!) 111/38 Pulse 91 Temp 97.1 °F (36.2 °C) Resp 16 Ht 5' 10\" (1.778 m) Wt 104.2 kg (229 lb 11.5 oz) SpO2 97% BMI 32.96 kg/m² Current Facility-Administered Medications Medication Dose Route Frequency  bivalirudin (ANGIOMAX) 250 mg in 0.9% sodium chloride (MBP/ADV) 50 mL MBP  0.5 mg/kg/hr IntraVENous CONTINUOUS  
 diphenhydrAMINE (BENADRYL) capsule 25 mg  25 mg Oral BID  famotidine (PEPCID) tablet 20 mg  20 mg Oral BID  predniSONE (DELTASONE) tablet 40 mg  40 mg Oral BID WITH MEALS  
 balsam peru-castor oiL (VENELEX) ointment   Topical BID  milrinone (PRIMACOR) 200 mcg/mL in 0.9% sodium chloride 100 mL infusion  0-0.75 mcg/kg/min IntraVENous TITRATE  atorvastatin (LIPITOR) tablet 10 mg  10 mg Oral DAILY  [Held by provider] apixaban (ELIQUIS) tablet 5 mg  5 mg Oral Q12H  
 magic mouthwash cpd (without sucralfate)  5 mL Oral TID PRN  
 heparin 25,000 units in D5W 250 ml infusion  8-25 Units/kg/hr IntraVENous TITRATE  heparin (porcine) injection 2,000 Units  2,000 Units IntraVENous PRN Or  
 heparin (porcine) injection 4,000 Units  4,000 Units IntraVENous PRN  
 DOBUTamine (DOBUTREX) 500 mg/250 mL (2,000 mcg/mL) infusion  0-10 mcg/kg/min IntraVENous TITRATE  ticagrelor (BRILINTA) tablet 90 mg  90 mg Oral Q12H  
 [Held by provider] carvediloL (COREG) tablet 3.125 mg  3.125 mg Oral BID WITH MEALS  insulin glargine (LANTUS) injection 10 Units  10 Units SubCUTAneous QHS  [Held by provider] insulin lispro (HUMALOG) injection 5 Units  5 Units SubCUTAneous TIDAC  alcohol 62% (NOZIN) nasal  1 Ampule  1 Ampule Topical Q12H  
 [Held by provider] bumetanide (BUMEX) tablet 1 mg  1 mg Oral BID  nitroglycerin (NITROSTAT) tablet 0.4 mg  0.4 mg SubLINGual PRN  
 sodium chloride (NS) flush 5-40 mL  5-40 mL IntraVENous Q8H  
 sodium chloride (NS) flush 5-40 mL  5-40 mL IntraVENous PRN  
 acetaminophen (TYLENOL) tablet 650 mg  650 mg Oral Q6H PRN Or  
 acetaminophen (TYLENOL) suppository 650 mg  650 mg Rectal Q6H PRN  polyethylene glycol (MIRALAX) packet 17 g  17 g Oral DAILY PRN  promethazine (PHENERGAN) tablet 12.5 mg  12.5 mg Oral Q6H PRN Or  
 ondansetron (ZOFRAN) injection 4 mg  4 mg IntraVENous Q6H PRN  
 insulin lispro (HUMALOG) injection   SubCUTAneous AC&HS  
 glucose chewable tablet 16 g  4 Tab Oral PRN  
 dextrose (D50W) injection syrg 12.5-25 g  12.5-25 g IntraVENous PRN  
 glucagon (GLUCAGEN) injection 1 mg  1 mg IntraMUSCular PRN  
 aspirin delayed-release tablet 81 mg  81 mg Oral DAILY  fentaNYL citrate (PF) injection 25 mcg  25 mcg IntraVENous Q2H PRN Objective:  
  
Physical Exam: 
Gen: chronically ill appearing  female, ashen/yellow appearance NAD Abdomen: soft, non-tender. Bowel sounds normal, obese Extremities: left leg BKA, right leg trace edema, discolored. Sheaths bilateral groins Heart: regular rate and rhythm, no murmur, S3/JVD Lungs: diminished, fewer crackles bilateral posterior bases Neck: supple, symmetrical, trachea midline Neurologic: alert and oriented x 4, calm Data Review:  
Recent Labs 04/13/21 
0099 04/12/21 
7418 04/11/21 
0425 WBC 5.4 7.0 7.3 HGB 11.1* 11.3* 11.0*  
HCT 35.0 36.0 35.2  188 200 Recent Labs   04/13/21 
0447 04/12/21 
1772 04/11/21 
0425 * 135* 135* K 3.8 3.6 3.8  103 104 CO2 25 26 24 * 104* 127* BUN 45* 47* 45* CREA 2.76* 2.70* 2.91* CA 8.7 8.4* 8.5 MG 2.0 2.2 2.3 PHOS  --  3.9 3.9 ALB 2.3* 2.4* 2.3* TBILI 1.2* 1.4* 1.4* ALT 11* 16 16 INR 1.3*  --   -- No results for input(s): TROIQ, CPK, CKMB in the last 72 hours. Intake/Output Summary (Last 24 hours) at 4/13/2021 1301 Last data filed at 4/13/2021 0800 Gross per 24 hour Intake 750.77 ml Output 2375 ml Net -1624.23 ml  
  
 
Cardiographics 
  Telemetry: Paced ECG: Paced, no acute changes Echocardiogram: 3/1/2021 · LV: Estimated LVEF is 50 - 55%. Visually measured ejection fraction. Normal cavity size. Upper normal wall thickness. Low normal systolic function. · MV: Mitral annular calcification. Mild to moderate mitral valve regurgitation is present. · TV: Mild to moderate tricuspid valve regurgitation is present. · PA: Pulmonary arterial systolic pressure is 44 mmHg. · Image quality for this study was technically difficult. New ECHO 4/7/2021:  
· LV: Estimated LVEF is 20 - 25%. Normal cavity size. Moderate concentric hypertrophy. Severely reduced systolic function. · RV: Dilated right ventricle. Mildly reduced systolic function. Pacing wire present · MV: Mitral valve non-specific thickening. Mild mitral annular calcification. Moderate to severe mitral valve regurgitation is present. · TV: Mild tricuspid valve regurgitation is present. · PA: Moderate pulmonary hypertension. Pulmonary arterial systolic pressure is 47 mmHg. · RA: Mildly dilated right atrium. Repeat echo 4/11/2021 on dobutamine: 
· LV: Estimated LVEF is 25 - 30%. Normal cavity size. Moderate concentric hypertrophy. Moderate-to-severely reduced systolic function. Pacemaker. . 
· LA: Moderately dilated left atrium. · RV: Dilated right ventricle. Borderline low systolic function. Pacing wire present · RA: Moderately dilated right atrium. · AV: Mild aortic valve focal thickening present. · MV: Mitral valve non-specific thickening. Mild mitral annular calcification. Mild to moderate mitral valve regurgitation is present. · TV: Mild tricuspid valve regurgitation is present. · PA: Moderate pulmonary hypertension. Pulmonary arterial systolic pressure is 52 mmHg. CXRAY: \"pulmonary vascular congestion without overt pulmonary edema\" Assessment:  
 
Active Problems: 
  Chest pain (11/23/2020) CHF (congestive heart failure) ([facility] [address] (4/7/2021) NSTEMI (non-ST elevated myocardial infarction) ([facility] [address] (4/7/2021) S/P cardiac cath (4/8/2021) Overview: 4/8/2021: cardiac cath showed MVD. Mod/severe MR, elevated PA pressures Plan:  
Nelsy Rodriguez is a 63 y.o. female with a PMH significant for chronic PAF, right arm lymphadema (s/p lymphectomy), bilateral mastectomy for breast CA, HTN. HLD, neuropathy, left BKA, SSS, DM II, CKD, Asthma, A-flutter, long term use of OAC, endocarditis  admitted for Chest pain [R07.9] NSTEMI (non-ST elevated myocardial infarction) ([facility] [address] [I21.4] CHF (congestive heart failure) ([facility] [address]    
 
NSTEMI: s/p cardiac cath which showed MVD, mod/severe MR, and PHTN, cardiogenic shock due to ischemic cardiomyopathy: 
Troponin peaked at 55, trended down to 28, no further trending necessary. Chest pain intermittent Cardiac index improved from 1.1- Mixed venous oxygen saturation is 80% and cardiac index 12.2 on rounds today. PA pressures in the 40s over low 20s. ECHO showed new onset ICM; EF 20-25%. Repeat on dobutamine 4/11/2021 showed EF of 25-30, mitral regurgitation reduced to mild to moderate. Patient declined for cardiac surgery due to prohibitively high riskinitiate Brilinta in preparation for possible high risk PCI when optimized She does understand a significantly higher than average risk of contrast-induced nephropathy that could progress to end-stage renal disease requiring dialysis, and a higher than average risk of myocardial infarction and death with her severe ischemic cardiomyopathy with cardiogenic shock currently on pressors. I discussed the risks and benefits of cardiac catheterization and PCI plus or minus Impella catheter with the patient who expressed understanding and wishes to proceed. Patient underwent successful PCI/ADDIS to mid LAD today without need for impella support. Was pre-medicated for IV contrast allergy. Re-admitted to ICU, continue on milrinone and dobutamine overnight and monitoring of hemodynamics with oximetric swan. Hold Bumex for tonight. Will re-evaluate in am.  
Restart Eliquis 5 mg PO tonight at 2100. Continue angiomax until 1425, then pull bilateral sheaths 2 hours after angiomax off. Watch for bleeding/hematoma. Mild/mod. mitral regurgitation: 
ECHO on 4/11/2021 showed mild/mod MR 
  
Acute on chronic systolic HF, with cardiogenic shock Continue bumex 1 mg PO BID (as per nephrology), hold evening dose. Coreg and ACE inhibitor/ARB/Entresto contraindicated due to cardiogenic shock, currently on dobutamine. Please titrate to MAP equal to 65. CI 2-2.2 is ok. Diuresing well with about 6.8 L negative, creatinine stable, down to 2.76 Continue milrinone as well please. Parox A-fib s/p AV node ablation   
Coreg held with cardiogenic shock on dobutamine Resume Eliquis 5 mg PO this evening  
  
Hypertension: controlled, actually a little low normal after sedation Monitor closely. Hold bumex this evening, hold coreg Continue dobutamine and milrinone  
  
PARAG on Chronic kidney disease stage IV : baseline 2-2.1 Renal function improved with dobutamine and improved cardiac output. Avoid nephrotoxic substances Follow BMP-Cr 2.76 Nephrology following, diuresing for congestion 
  
DM II: 
Manage per internal medicine  
  
HLD:  
LDL 44 on 4/11/2021 Starting low-dose statin due to three-vessel disease Noted his history of questionable nausea only with pravastatin in 2010, and that statin may have been held due to elevated LFTs, but LFTs are currently normal 
  
Anxiety:   
Continue Zoloft  
  
Chronic pain: 
Management as per pulmonary critical care and internal medicine Lars HAY,LUIZA 
DNP,APRN,AG-ACNP-BC Patient seen and examined by me with the above nurse practitioner. I personally performed all components of the history, physical, and medical decision making and agree with the assessment and plan with minor modifications as noted. Today the patient had successful PCI of the culprit LAD lesion. Other borderline lesions do not appear flow-limiting and will be medically managed with consideration of outpatient stress testing if indicated. General PE Gen:  NAD Mental Status - Alert. General Appearance - Not in acute distress. HEENT: 
PERRL, no carotid bruits or JVD Chest and Lung Exam  
Inspection: Accessory muscles - No use of accessory muscles in breathing. Auscultation:  
Breath sounds: - Normal.  
Cardiovascular Inspection: Jugular vein - Bilateral - Inspection Normal.  
Palpation/Percussion:  
Apical Impulse: - Normal.  
Auscultation: Rhythm - Regular. Heart Sounds - S1 WNL and S2 WNL. No S3 or S4. Murmurs & Other Heart Sounds: Auscultation of the heart reveals - No Murmurs. Peripheral Vascular Upper Extremity: Inspection - Bilateral - No Cyanotic nailbeds or Digital clubbing. Lower Extremity:  
Palpation: Edema - Bilateral - No edema. Abdomen:   Soft, non-tender, bowel sounds are active. Neuro: A&O times 3, CN and motor grossly WNL Today the patient had successful PCI of the culprit LAD lesion. Other borderline lesions do not appear flow-limiting and will be medically managed with consideration of outpatient stress testing if indicated. Hold Bumex tonight post catheterization, restart Eliquis.

## 2021-04-13 NOTE — PROGRESS NOTES
Spiritual Care Assessment/Progress Note [address] 
 
 
NAME: Ester England      MRN: [id number] AGE: 63 y.o. SEX: female Roman Catholic Affiliation: [employer]  
Language: [place] 4/13/2021     Total Time (in minutes): 7 Spiritual Assessment begun in [facility] 2 CRITICAL CARE 2 through conversation with: 
  
    []Patient        [] Family    [] Friend(s) Reason for Consult: Initial/Spiritual assessment, critical care Spiritual beliefs: (Please include comment if needed) 
   [] Identifies with a adriane tradition:     
   [] Supported by a adriane community:        
   [] Claims no spiritual orientation:       
   [] Seeking spiritual identity:            
   [] Adheres to an individual form of spirituality:       
   [x] Not able to assess:                   
 
    
Identified resources for coping:  
   [] Prayer                           
   [] Music                  [] Guided Imagery 
   [] Family/friends                 [] Pet visits [] Devotional reading                         [x] Unknown 
   [] Other:                                         
 
 
Interventions offered during this visit: (See comments for more details) Patient Interventions: Initial visit Plan of Care: 
 
 [x] Support spiritual and/or cultural needs  
 [] Support AMD and/or advance care planning process    
 [] Support grieving process 
 [] Coordinate Rites and/or Rituals  
 [] Coordination with community clergy [] No spiritual needs identified at this time 
 [] Detailed Plan of Care below (See Comments)  [] Make referral to Music Therapy 
[] Make referral to Pet Therapy    
[] Make referral to Addiction services 
[] Make referral to [facility] 
[] Make referral to Spiritual Care Partner 
[] No future visits requested       
[x] Follow up upon further referrals Comments:  Attempted visit in CCU for spiritual assessment.   Patient appeared to be sleeping soundly;  did not attempt to awaken patient. No family/friends present. .  Unable to assess for spiritual needs or concerns.  available upon referral by nurse or by patient request.   
  
BURAK Elliott, [facility], Staff  [facility]  Paging Service  [phone])

## 2021-04-13 NOTE — Clinical Note
1905: Trevor Willoughby just got to bedside, I held [facility] pressure for 15 minutes until he arrived. Trevor Willoughby spoke to Cardiologist about ordering Ultrasound and getting a Vascular consult called.

## 2021-04-13 NOTE — PROGRESS NOTES
Physical Therapy Screening: 
Services are indicated and therapy order is required. When medically stable. An InBasket screening referral was triggered for physical therapy based on results obtained during the nursing admission assessment. The patients chart was reviewed and the patient is appropriate for a skilled therapy evaluation. Please order a consult for physical therapy if you are in agreement and would like an evaluation to be completed. Thank you.  
 
Maral Chun, PT, DPT

## 2021-04-13 NOTE — PROGRESS NOTES
[address] [facility] Adult  Hospitalist Group Critical Care Progress Note Darylene Paris, MD 
Answering service: [phone] OR [phone] from in house phone Date of Service:  2021 NAME:  Avery Nurse :  1960 MRN:  [id number] Interval history / Subjective: Went for high risk PCI. Arrived on bival , milrinone, dobut,, stent placed to LAD. Assessment & Plan:  
 
NSTEMI: with multivessel disease, not a candidate for CABG per CTS. On dobutamine and milrinone for decompensated heart failure Management per cards, continue Aron Chimes Sp  high risk PCI per cards today  got stent to LAD Cont heparin gtt 
  
Acute decompensated heart failure with cardiogenic shock. Continue milrinone and dobutamine drip Continue diuresis as tolerated Closely monitor hemodynamics, has PA catheter 
  
Paroxysmal AF Rate controlled. Apixaban on hold presently 
  
PARAG on CKD4. Cr slightly improved  Cardiorenal syndrome Nephrology following 
  
Elevated LFTs - likely hepatic congestion DM2: Lantus; Lispro w meals; SSI HLD: Holding statin due to transaminitis Anxiety: Zoloft 
  
Deconditioning: PT/OT when appropriate; c/b hx of BKA w poorly fitting prosthesis  
  
I personally spent   45   minutes of critical care time. This is time spent at this critically ill patient's bedside actively involved in patient care as well as the coordination of care and discussions with the patient's family. This does not include any procedural time which has been billed separately. 
 
   
 
Hospital Problems  Date Reviewed: 3/1/2021 Codes Class Noted POA  
 S/P cardiac cath ICD-10-CM: Z98.890 ICD-9-CM: V45.89  2021 Unknown Overview Signed 2021  1:01 PM by Sherren Broaden, NP  
  2021: cardiac cath showed MVD. Mod/severe MR, elevated PA pressures  CHF (congestive heart failure) ([facility] ICD-10-CM: I50.9 ICD-9-CM: 428.0  4/7/2021 Unknown NSTEMI (non-ST elevated myocardial infarction) ([facility] ICD-10-CM: I21.4 ICD-9-CM: 410.70  4/7/2021 Unknown Chest pain ICD-10-CM: R07.9 ICD-9-CM: 786.50  11/23/2020 Unknown Review of Systems:  
Review of systems not obtained due to patient factors. Vital Signs:  
 Last 24hrs VS reviewed since prior progress note. Most recent are: 
Visit Vitals BP (!) 110/35 Pulse 91 Temp (!) 95.3 °F (35.2 °C) Resp 20 Ht 5' 10\" (1.778 m) Wt 104.2 kg (229 lb 11.5 oz) SpO2 97% BMI 32.96 kg/m² Intake/Output Summary (Last 24 hours) at 4/13/2021 1406 Last data filed at 4/13/2021 1353 Gross per 24 hour Intake 1027.82 ml Output 2375 ml Net -1347.18 ml Physical Examination:  
 
 
     
Constitutional:  sedated ENT:  Oral mucous moist, oropharynx benign. Resp:  CTA bilaterally. No wheezing/rhonchi/rales. No accessory muscle use CV:  Regular rhythm, normal rate, no murmurs, gallops, rubs GI:  Soft, non distended, non tender. normoactive bowel sounds, no hepatosplenomegaly Musculoskeletal:  No edema, warm, 2+ pulses throughout. L BKA stump intact. Groin sheath sites intact. Neurologic:  Moves all extremities. sedated Psych:  sedated Skin:  Good turgor, no rashes or ulcers Hematologic/Lymphatic/Immunlogic:  No jaundice nor lymph node swelling :  deferred Eyes:  EOMI. Anicteric sclerae, PERRL. Data Review:  
 Review and/or order of clinical lab test 
 
 
Labs:  
 
Recent Labs 04/13/21 
4880 04/12/21 
6435 WBC 5.4 7.0 HGB 11.1* 11.3* HCT 35.0 36.0  188 Recent Labs 04/13/21 
1945 04/12/21 
1583 04/11/21 
0425 * 135* 135* K 3.8 3.6 3.8  103 104 CO2 25 26 24 BUN 45* 47* 45* CREA 2.76* 2.70* 2.91* * 104* 127* CA 8.7 8.4* 8.5 MG 2.0 2.2 2.3 PHOS  --  3.9 3.9 Recent Labs   04/13/21 
(23) 8115 6589 04/12/21 
0313 [facility]/11/21 [facility] 11* 16 16 AP 69 74 73 TBILI 1.2* 1.4* 1.4* TP 5.5* 5.8* 5.6* ALB 2.3* 2.4* 2.3*  
GLOB 3.2 3.4 3.3 Recent Labs 04/13/21 
0447 04/12/21 
1020 04/12/21 
0131 INR 1.3*  --   --   
PTP 13.0*  --   --   
APTT 94.3* 67.7* 65.2* No results for input(s): FE, TIBC, PSAT, FERR in the last 72 hours. Lab Results Component Value Date/Time Folate 8.5 03/14/2020 02:49 PM  
  
No results for input(s): PH, PCO2, PO2 in the last 72 hours. No results for input(s): CPK, CKNDX, TROIQ in the last 72 hours. No lab exists for component: CPKMB Lab Results Component Value Date/Time Cholesterol, total 79 04/11/2021 04:25 AM  
 HDL Cholesterol 18 04/11/2021 04:25 AM  
 LDL, calculated 44.6 04/11/2021 04:25 AM  
 Triglyceride 82 04/11/2021 04:25 AM  
 CHOL/HDL Ratio 4.4 04/11/2021 04:25 AM  
 
Lab Results Component Value Date/Time Glucose (POC) 182 (H) 04/13/2021 01:15 PM  
 Glucose (POC) 150 (H) 04/13/2021 07:53 AM  
 Glucose (POC) 156 (H) 04/12/2021 10:37 PM  
 Glucose (POC) 211 (H) 04/12/2021 03:07 PM  
 Glucose (POC) 95 04/12/2021 11:44 AM  
 
Lab Results Component Value Date/Time Color YELLOW/STRAW 12/19/2020 04:48 PM  
 Appearance CLOUDY (A) 12/19/2020 04:48 PM  
 Specific gravity 1.013 12/19/2020 04:48 PM  
 pH (UA) 5.5 12/19/2020 04:48 PM  
 Protein 100 (A) 12/19/2020 04:48 PM  
 Glucose Negative 12/19/2020 04:48 PM  
 Ketone Negative 12/19/2020 04:48 PM  
 Bilirubin Negative 12/19/2020 04:48 PM  
 Urobilinogen 1.0 12/19/2020 04:48 PM  
 Nitrites Positive (A) 12/19/2020 04:48 PM  
 Leukocyte Esterase TRACE (A) 12/19/2020 04:48 PM  
 Epithelial cells FEW 12/19/2020 04:48 PM  
 Bacteria 4+ (A) 12/19/2020 04:48 PM  
 WBC 5-10 12/19/2020 04:48 PM  
 RBC 0-5 12/19/2020 04:48 PM  
 
 
 
Medications Reviewed:  
 
Current Facility-Administered Medications Medication Dose Route Frequency  bivalirudin (ANGIOMAX) 250 mg in 0.9% sodium chloride (MBP/ADV) 50 mL MBP  0.5 mg/kg/hr IntraVENous CONTINUOUS  
 diphenhydrAMINE (BENADRYL) capsule 25 mg  25 mg Oral BID  famotidine (PEPCID) tablet 20 mg  20 mg Oral BID  predniSONE (DELTASONE) tablet 40 mg  40 mg Oral BID WITH MEALS  
 balsam peru-castor oiL (VENELEX) ointment   Topical BID  milrinone (PRIMACOR) 200 mcg/mL in 0.9% sodium chloride 100 mL infusion  0-0.75 mcg/kg/min IntraVENous TITRATE  atorvastatin (LIPITOR) tablet 10 mg  10 mg Oral DAILY  [Held by provider] apixaban (ELIQUIS) tablet 5 mg  5 mg Oral Q12H  
 magic mouthwash cpd (without sucralfate)  5 mL Oral TID PRN  
 heparin 25,000 units in D5W 250 ml infusion  8-25 Units/kg/hr IntraVENous TITRATE  heparin (porcine) injection 2,000 Units  2,000 Units IntraVENous PRN Or  
 heparin (porcine) injection 4,000 Units  4,000 Units IntraVENous PRN  
 DOBUTamine (DOBUTREX) 500 mg/250 mL (2,000 mcg/mL) infusion  0-10 mcg/kg/min IntraVENous TITRATE  ticagrelor (BRILINTA) tablet 90 mg  90 mg Oral Q12H  
 [Held by provider] carvediloL (COREG) tablet 3.125 mg  3.125 mg Oral BID WITH MEALS  insulin glargine (LANTUS) injection 10 Units  10 Units SubCUTAneous QHS  [Held by provider] insulin lispro (HUMALOG) injection 5 Units  5 Units SubCUTAneous TIDAC  alcohol 62% (NOZIN) nasal  1 Ampule  1 Ampule Topical Q12H  
 [Held by provider] bumetanide (BUMEX) tablet 1 mg  1 mg Oral BID  nitroglycerin (NITROSTAT) tablet 0.4 mg  0.4 mg SubLINGual PRN  
 sodium chloride (NS) flush 5-40 mL  5-40 mL IntraVENous Q8H  
 sodium chloride (NS) flush 5-40 mL  5-40 mL IntraVENous PRN  
 acetaminophen (TYLENOL) tablet 650 mg  650 mg Oral Q6H PRN Or  
 acetaminophen (TYLENOL) suppository 650 mg  650 mg Rectal Q6H PRN  polyethylene glycol (MIRALAX) packet 17 g  17 g Oral DAILY PRN  promethazine (PHENERGAN) tablet 12.5 mg  12.5 mg Oral Q6H PRN  Or  
 ondansetron (ZOFRAN) injection 4 mg  4 mg IntraVENous Q6H PRN  
 insulin lispro (HUMALOG) injection SubCUTAneous AC&HS  
 glucose chewable tablet 16 g  4 Tab Oral PRN  
 dextrose (D50W) injection syrg 12.5-25 g  12.5-25 g IntraVENous PRN  
 glucagon (GLUCAGEN) injection 1 mg  1 mg IntraMUSCular PRN  
 aspirin delayed-release tablet 81 mg  81 mg Oral DAILY  fentaNYL citrate (PF) injection 25 mcg  25 mcg IntraVENous Q2H PRN  
 
______________________________________________________________________ EXPECTED LENGTH OF STAY: 4d 2h 
ACTUAL LENGTH OF STAY:          6 
 
            
Helon Koyanagi, MD

## 2021-04-14 NOTE — PROGRESS NOTES
Nephrology Progress Note Monroe Kiser  
 
[email or web address]  Phone - [phone] Patient: Rosana Mccloud    YOB: 1960 Date- 4/14/2021   Admit Date: 4/7/2021 CC: Follow up for acute kidney injury on CKD IMPRESSION & PLAN:  
· PARAG likely due to ATN,CRS from suppressed EF from recent MI and NOE.  
· NSTEMI: s/p cardiac cath which showed MVD, mod/severe MR, and PHTN, cardiogenic shock due to ischemic cardiomyopathy- s/p PCI/ADDIS to mid LAD  on 4-13-21 · Large tense painful hematoma R groin/thigh after R CFA access for cardiac cath · Shock- cardiogenic + hemorrhagic · Iron defi anemia · Hyponatremia due to chf 
· afib - s/p AV node ablation · Anemia - CHRONIC- f/b DR. RUIZ 
· hypertension · H/O LEFT BKA · ckd  3 LIKELY due to DM nephropathy and HTN nephrosclerosis- BL CR. 1.5-1.9 
· H/O Diabetic foot · Proteinuria likely due to DM nephropathy and HTN nephroscl- urine pr/cr 1.5 g/g · Vit d defi PLAN- 
· Continue vasopressor support · Follow BMP · Agree with holding Bumex · PRBC transfusion as per Dr. Nicole Shine Subjective: Interval History:  
-She developed hematoma after cardiac cath yesterday She received 7 units PRBC's ,FP x 2, and platelets x 1 She is on the vent Blood pressure low requiring vasopressor support Objective:  
Vitals:  
 04/14/21 1002 04/14/21 1005 04/14/21 1011 04/14/21 1015 BP: (!) 72/41 (!) 73/41 (!) 90/45 (!) 90/46 Pulse: 98 100 (!) 101 (!) 102 Resp: (!) 0 (!) 0 (!) 0 (!) 0 Temp: 97.9 °F (36.6 °C) 97.9 °F (36.6 °C) 97.9 °F (36.6 °C) 97.9 °F (36.6 °C) SpO2: 100%  100% 100% Weight:      
Height:      
  
04/13 0701 - 04/14 0700 In: 89217.1 [I.V.:57371.1] Out: 1093 [Urine:975; Drains:500] Last 3 Recorded Weights in this Encounter 04/10/21 0557 04/11/21 1328 04/12/21 1400 Weight: 112.5 kg (248 lb 0.3 oz) 112.5 kg (248 lb 0.3 oz) 104.2 kg (229 lb 11.5 oz) Physical exam:  
GEN: intubated on vent NECK-no mass, ET TUBE 
RESP: Clear b/l, no wheezing CVS: RRR,S1,S2 ABDO: soft , non tender, EXT: ++ Edema ,bka stump + NEURO:Can't access due to patient's current condition  : Munroe + Chart reviewed. Pertinent Notes reviewed. Data Review : 
Recent Labs 04/14/21 
7764 04/14/21 
0138 04/13/21 
8180 04/12/21 
9946 * 135* 134* 135* K 4.0 3.9 3.8 3.6  105 101 103 CO2 19* 20* 25 26 BUN 40* 41* 45* 47* CREA 2.43* 2.42* 2.76* 2.70* * 260* 172* 104* CA 7.7* 8.0* 8.7 8.4* MG 1.8 1.8 2.0 2.2 PHOS 5.7* 5.3*  --  3.9 Recent Labs 04/14/21 
9921 04/14/21 
0332 04/14/21 
0138 04/13/21 
1847 WBC  --  16.5* 12.5* 5.4 HGB 7.2* 8.8* 10.8* 9.5* HCT 21.4* 26.3* 32.4* 30.5* PLT  --  156 169 200 No results for input(s): FE, TIBC, PSAT, FERR in the last 72 hours. Medication list  reviewed Current Facility-Administered Medications Medication Dose Route Frequency  fentaNYL (PF) 1,500 mcg/30 mL (50 mcg/mL) infusion  0-200 mcg/hr IntraVENous TITRATE  propofol (DIPRIVAN) 10 mg/mL infusion  0-50 mcg/kg/min IntraVENous TITRATE  meropenem (MERREM) 500 mg in 0.9% sodium chloride (MBP/ADV) 50 mL MBP  500 mg IntraVENous Q8H  
 [START ON 4/15/2021] vancomycin (VANCOCIN) 1500 mg in  ml infusion  1,500 mg IntraVENous Q36H  
 0.9% sodium chloride infusion 250 mL  250 mL IntraVENous PRN  chlorhexidine (ORAL CARE KIT) 0.12 % mouthwash 15 mL  15 mL Oral Q12H  clopidogreL (PLAVIX) tablet 75 mg  75 mg Oral DAILY  fentaNYL citrate (PF) injection 50 mcg  50 mcg IntraVENous Q2H PRN  
 fentaNYL citrate (PF) injection 25 mcg  25 mcg IntraVENous Q1H PRN  
 0.9% sodium chloride infusion 250 mL  250 mL IntraVENous PRN  
 aspirin chewable tablet 81 mg  81 mg Per G Tube DAILY  sodium chloride (NS) flush 5-40 mL  5-40 mL IntraVENous Q8H  
 sodium chloride (NS) flush 5-40 mL  5-40 mL IntraVENous PRN  
 naloxone (NARCAN) injection 0.4 mg  0.4 mg IntraVENous PRN  
 0.9% sodium chloride infusion  50 mL/hr IntraVENous CONTINUOUS  
 NOREPINephrine (LEVOPHED) 8 mg in 5% dextrose 250mL (32 mcg/mL) infusion  0.5-100 mcg/min IntraVENous TITRATE  vasopressin (VASOSTRICT) 20 Units in 0.9% sodium chloride 100 mL infusion  0-0.03 Units/min IntraVENous TITRATE  
 0.9% sodium chloride infusion 250 mL  250 mL IntraVENous PRN  
 heparin (porcine) 2,000 Units in 0.9% sodium chloride 500 mL Irrigation    PRN  
 0.9% sodium chloride infusion 250 mL  250 mL IntraVENous PRN  
 0.9% sodium chloride infusion 250 mL  250 mL IntraVENous PRN  
 EPINEPHrine (ADRENALIN) 5 mg in 0.9% sodium chloride 250 mL infusion  0-20 mcg/min IntraVENous TITRATE  
 0.9% sodium chloride infusion 250 mL  250 mL IntraVENous PRN  
 0.9% sodium chloride infusion 250 mL  250 mL IntraVENous PRN  
 famotidine (PEPCID) tablet 20 mg  20 mg Oral BID  
 balsam peru-castor oiL (VENELEX) ointment   Topical BID  atorvastatin (LIPITOR) tablet 10 mg  10 mg Oral DAILY  [Held by provider] apixaban (ELIQUIS) tablet 5 mg  5 mg Oral Q12H  
 magic mouthwash cpd (without sucralfate)  5 mL Oral TID PRN  
 DOBUTamine (DOBUTREX) 500 mg/250 mL (2,000 mcg/mL) infusion  0-10 mcg/kg/min IntraVENous TITRATE  [Held by provider] carvediloL (COREG) tablet 3.125 mg  3.125 mg Oral BID WITH MEALS  insulin glargine (LANTUS) injection 10 Units  10 Units SubCUTAneous QHS  [Held by provider] insulin lispro (HUMALOG) injection 5 Units  5 Units SubCUTAneous TIDAC  alcohol 62% (NOZIN) nasal  1 Ampule  1 Ampule Topical Q12H  
 [Held by provider] bumetanide (BUMEX) tablet 1 mg  1 mg Oral BID  nitroglycerin (NITROSTAT) tablet 0.4 mg  0.4 mg SubLINGual PRN  
 sodium chloride (NS) flush 5-40 mL  5-40 mL IntraVENous Q8H  
 sodium chloride (NS) flush 5-40 mL  5-40 mL IntraVENous PRN  
 acetaminophen (TYLENOL) tablet 650 mg  650 mg Oral Q6H PRN  Or  
 acetaminophen (TYLENOL) suppository 650 mg  650 mg Rectal Q6H PRN  polyethylene glycol (MIRALAX) packet 17 g  17 g Oral DAILY PRN  promethazine (PHENERGAN) tablet 12.5 mg  12.5 mg Oral Q6H PRN Or  
 ondansetron (ZOFRAN) injection 4 mg  4 mg IntraVENous Q6H PRN  
 insulin lispro (HUMALOG) injection   SubCUTAneous AC&HS  
 glucose chewable tablet 16 g  4 Tab Oral PRN  
 dextrose (D50W) injection syrg 12.5-25 g  12.5-25 g IntraVENous PRN  
 glucagon (GLUCAGEN) injection 1 mg  1 mg IntraMUSCular PRN Rondal Sly, [address] [facility] [employer] [address] [place], [address] Phone - [phone]               Fax - [phone]

## 2021-04-14 NOTE — OP NOTES
[address] 
OPERATIVE REPORT Name:  Demetrio Cabrera 
MR#:  [id number] :  1960 ACCOUNT #:  [de-identified] DATE OF SERVICE:  2021 PREOPERATIVE DIAGNOSIS:  Right groin bleed after cardiac catheterization. POSTOPERATIVE DIAGNOSIS:  Right groin bleed after cardiac catheterization. PROCEDURE PERFORMED:  Right groin exploration with repair of femoral artery, evacuation of hematoma, washout, and placement of drains. SURGEON:  Morales Hodges MD 
 
ANESTHESIA:  General. 
 
COMPLICATIONS:  None. SPECIMENS REMOVED:  None. IMPLANTS:  None. ESTIMATED BLOOD LOSS:  1000 mL (combined existing hematoma and fresh blood loss). INDICATIONS:  The patient is a 78-year-old woman, who earlier today underwent a right femoral artery access for cardiac catheterization. She had her right femoral sheath pulled approximately 2 hours prior, has developed a large, tense, expansile hematoma in the anterior aspect of the right thigh and groin. She is in shock and the hematoma is large and tense enough that it requires exploration and evacuation. This was discussed with the patient's . PROCEDURE:  After obtaining informed consent, the patient was placed supine on the operating table. After adequate induction of general anesthesia, site and patient confirmation, administration of prophylactic antibiotics, the lower abdomen, right groin, and thigh were prepped and draped in sterile fashion. A vertical incision was then made down the middle of the right thigh over the area of a large tense hematoma and through existing stick sites in the right groin. A large tense arterial hematoma under great pressure was encountered. This partially evacuate itself, the remainder was manually evacuated. The hematoma had created an enormous pocket laterally and superiorly coursing around towards the buttock and a second large hematoma pocket inferiorly and medially.   Thrombus in this area was scooped out and filled several stainless steel basins. Sponge stick was used to control the active arterial bleeding of the femoral artery. Ultimately, dissection was carried down until a defect on the lateral aspect of the right femoral artery just at or above the inguinal ligament was identified and repaired with a series of pledgeted Prolene sutures. This was not without difficulty due to the friable nature of the tissue and the surrounding tissue destruction from hematoma. Once hemostasis was obtained, the entire wound including the two pockets as mentioned above were copiously irrigated and evacuated. A Sean-Roche drain was placed in each of the large dead spaces and brought out through separate stab wounds. The wound was closed in multiple layers of 2-0 Vicryl suture and the skin was closed with standard skin staples. The patient remained intubated and sedated, and was returned directly to intensive care unit, where she will remain overnight. Jazmine Franco MD 
 
 
[id number] 
D:  04/14/2021 10:13 
T:  04/14/2021 14:48 JOB #:  [id number] CC:  Ailyn Gan MD

## 2021-04-14 NOTE — PROGRESS NOTES
0700: Bedside shift change report received from Nazia Martinez, [address] [place] [address] (offgoing nurse). Report included the following information SBAR, Kardex, ED Summary, Procedure Summary, Intake/Output, MAR and Recent Results. 0800: Shift assessment completed, see flowsheets 1052: Plasma infusion started, VSS RN at bedside 1124: Plasma infusion completed, VSS at this time. 1200: Reassessment completed, see flowsheets. OGT placed at 56 cm for PO meds per Dr. Gabe Franco, STAT KUB ordered for placement verification. 1238: First unit of PRBCs started at 75 mL/hr, no evidence of transfusion reaction noted. Will continue to monitor 1451: First unit of PRBCs completed, no evidence of transfusion reaction noted 1500: Second unit of PRBCs started at 75 mL/hr, no evidence of transfusion reaction noted. Will continue to monitor 1600: Reassessment completed, see flowsheets. Decreased output noted from YOANA drains w/ increased swelling in pts R groin and thigh, Dr. Gabe Franco and Dr. Fouzia Blair notifed. Dr. Fouzia Blair to see patient shortly. No new orders received. 1645: Dr. Fouzia Blair at bedside to assess pt, aware of increased swelling in pts R groin and thigh -- Per MD will continue to monitor for now, no new orders received 1720: Second unit of PRBCs completed, no evidence of transfusion reaction noted -- H &H/ PT &INR collected and sent to lab  
 
1750: Second unit of plasma started, VSS at this time. no evidence of transfusion reaction noted 1829: Second unit of plasma completed, VSS at this time. no evidence of transfusion reaction noted 
 
1900: Bedside shift change report given to Yuliet Elizalde RN (oncoming nurse) by Elaine Rivera RN (offgoing nurse). Report included the following information SBAR, Kardex, ED Summary, Procedure Summary, Intake/Output, MAR and Recent Results.

## 2021-04-14 NOTE — PROGRESS NOTES
Large tense painful hematoma R groin/thigh after R CFA access for cardiac cath Nothing above inguinal ligament or in retroperitoneum, all bleeding is anterior but is significant enough that it mandates exploration and evacuation

## 2021-04-14 NOTE — CONSULTS
Palliative Medicine Consult Don: [id number] ([id number]) Patient Name: Ester England YOB: 1960 Date of Initial Consult: 4/14/2021 Reason for Consult: Care Decisions Requesting Provider: Lisa Hay MD 
Primary Care Physician: Estiven Ashford MD 
 
 SUMMARY:  
Ester England is a 63 y.o. with a past history of a-fib, breast cancer, CKD, DMII, HTN, morbid obesity, osteoarthritis, pacemaker, and asthma, who was transferred on 4/7/2021 from [facility] with a diagnosis of Chest pain and acute on chronic CHF. Planned for cardiac cath +/- PCI She was a high risk PCI, but agreed to the risk as her risk of progressive heart failure and death was likely higher without cardiac cath She had a cath on 4/8 which showed multi vessel disease and moderate to severe MR with pulm htn. She also has new onset ICM with an EF of 20-25% She was transferred to the CCU- was treated for cardiogenic shock and started on inotropic support. Cardiac surgery evaluated her, but patient declined a surgical option due to it being too high risk. Richmond-Brunilda catheter was placed on 4/9 She underwent PCI/ADDIS to mid LAD on 4/13- it was successful without need for impella support. That evening, after the sheath was pulled, she had an actively expanding hematoma to the right groin. Vascular surgery took her to the OR for a right groin exploration and hematoma evacuation. Drains were placed to help control bleeding She has had impressive output from the drains still this morning. She is being transfused Current medical issues leading to Palliative Medicine involvement include: goals of care in the setting of critical illness requiring a high risk cardiac procedure that resulted in complications PALLIATIVE DIAGNOSES:  
1. DNR Discussion 2. Goals of care 3. Need for emotional support 4. Shortness of breath 5. Hemodynamically unstable PLAN:  
1.  Reviewed the chart and spoke with nursing as well as the attending 2. I spoke to her  over the phone initially- he is very tearful on the phone. He is clearly expecting that she will pass today. I shared with him that I did not know, but that it was worth addressing CODE status. He said that if she could not come home and be \"the way she was\" she would not be happy with that. Will make her a DNR, but will readdress if she is able to pull through this and makes a recovery. 3. I met with him again in person in the room- he is really expecting she will not make it through the night- he wanted me to give him some assurance either way. I shared that she was critically ill, but we were doing everything we could to support her. I let him know we will know more tomorrow 4. Will plan to follow up tomorrow with him to help walk him through this pending the events of the next 24 hours 5. Initial consult note routed to primary continuity provider and/or primary health care team members 6. Communicated plan of care with: Palliative [facility] Team 
 
 GOALS OF CARE / TREATMENT PREFERENCES:  
 
GOALS OF CARE: 
Patient/Health Care Proxy Stated Goals: Cure TREATMENT PREFERENCES:  
Code Status: DNR Advance Care Planning: 
[x] The [facility] Interdisciplinary Team has updated the ACP Navigator with [address] and Patient Capacity Primary Decision Maker: WinnKatrinay - Spouse - [phone] Advance Care Planning 4/7/2021 Confirm Advance Directive Yes, on file Patient Would Like to Complete Advance Directive - Medical Interventions: Limited additional interventions Other Instructions: Other: As far as possible, the palliative care team has discussed with patient / health care proxy about goals of care / treatment preferences for patient. HISTORY:  
 
History obtained from: chart, family CHIEF COMPLAINT: none- intubated and sedated HPI/SUBJECTIVE: The patient is:  
[] Verbal and participatory [x] Non-participatory due to:  
condition Clinical Pain Assessment (nonverbal scale for severity on nonverbal patients):  
Clinical Pain Assessment Severity: 0 Activity (Movement): Lying quietly, normal position Duration: for how long has pt been experiencing pain (e.g., 2 days, 1 month, years) Frequency: how often pain is an issue (e.g., several times per day, once every few days, constant) FUNCTIONAL ASSESSMENT:  
 
Palliative Performance Scale (PPS): PPS: 20 
 
 
 PSYCHOSOCIAL/SPIRITUAL SCREENING:  
 
Palliative IDT has assessed this patient for cultural preferences / practices and a referral made as appropriate to needs (Cultural Services, Patient Advocacy, Ethics, etc.) Any spiritual / Mandaen concerns: 
[] Yes /  [x] No 
 
Caregiver Burnout: 
[] Yes /  [x] No /  [] No Caregiver Present Anticipatory grief assessment:  
[x] Normal  / [] Maladaptive ESAS Anxiety: Anxiety: 0 
 
ESAS Depression: Depression: 0 REVIEW OF SYSTEMS:  
 
Positive and pertinent negative findings in ROS are noted above in HPI. The following systems were [x] reviewed / [] unable to be reviewed as noted in HPI Other findings are noted below. Systems: constitutional, ears/nose/mouth/throat, respiratory, gastrointestinal, genitourinary, musculoskeletal, integumentary, neurologic, psychiatric, endocrine. Positive findings noted below. Modified ESAS Completed by: provider Fatigue: 10    
Depression: 0 Pain: 0 Anxiety: 0 Nausea: 0 Dyspnea: 0 Stool Occurrence(s): 1 PHYSICAL EXAM:  
 
From RN flowsheet: 
Wt Readings from Last 3 Encounters:  
04/12/21 229 lb 11.5 oz (104.2 kg) 03/10/21 232 lb (105.2 kg) 02/08/21 264 lb (119.7 kg) Blood pressure (!) 119/58, pulse 92, temperature 97 °F (36.1 °C), resp. rate (!) 0, height 5' 10\" (1.778 m), weight 229 lb 11.5 oz (104.2 kg), SpO2 100 %. Pain Scale 1: Behavioral Pain Scale (BPS) Pain Intensity 1: 3 Pain Onset 1: acute Pain Location 1: Leg 
Pain Orientation 1: Right Pain Description 1: Aching Pain Intervention(s) 1: Medication (see MAR) Last bowel movement, if known:  
 
Constitutional: sedated Eyes: pupils equal, anicteric ENMT: no nasal discharge, moist mucous membranes Respiratory: on vent Skin: warm, dry, surgical sites to groin, two drains Other: 
 
 
 HISTORY:  
 
Active Problems: 
  Chest pain (11/23/2020) CHF (congestive heart failure) ([address] (4/7/2021) NSTEMI (non-ST elevated myocardial infarction) ([address] (4/7/2021) S/P cardiac cath (4/8/2021) Overview: 4/13/2021: s/p PCI/ADDIS to mid LAD x 1  
    4/8/2021: cardiac cath showed MVD. Mod/severe MR, elevated PA pressures Past Medical History:  
Diagnosis Date  Acquired lymphedema Right arm  Acute respiratory failure ([address] 12/19/2020  Arrhythmia  Asthma  Atrial fibrillation ([address] Dr. Chico Gunderson and Dr. Danielle Foster [facility])  Cancer ([address] bilateral breast  
 Chronic kidney disease  Diabetes mellitus type II   
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  
Procedure Laterality Date  HX AFIB ABLATION    
 HX AMPUTATION FOOT Left 04/2020  HX BREAST RECONSTRUCTION Bilateral   
 HX CARPAL TUNNEL RELEASE [address] [id number] [facility]  RIGHT MODIFIED RADICAL   
 HX MASTECTOMY Left   
 no lymphnode removal  
 HX [address]  
 right  HX ORTHOPAEDIC Right   
 knee surgery  HX PACEMAKER  11/17/2020 Dr. Michael Patel. Insertion of permanent pacemaker. AV node ablation  HX PACEMAKER    
 IR INSERT TUNL CVC W PORT OVER 5 YEARS    
 IR INSERT TUNL CVC W/O PORT OVER 5 YR  5/4/2020  IR INSERT TUNL CVC W/O PORT OVER 5 YR  9/8/2020  IR REMOVE TUNL CVAD W/O PORT / PUMP  6/26/2020  IR REMOVE TUNL CVAD W/O PORT / PUMP  10/19/2020  [place] ABDOMEN SURGERY PROC UNLISTED    
 gastric bypass [address] AND 1994  [place] GASTRIC BYPASS,OBESE<150CM [id number]  7/08  
 [facility]  [place] ICAR CATHETER ABLATION ATRIOVENTR NODE FUNCTION N/A 11/17/2020 ABLATION AV NODE performed by Tony Kurtz MD at [address] CATH LAB [address]  [place] NEEDLE BIOPSY LIVER,W OTHR PROC  8.21.07  
 [place] RELOCATION OF SKIN POCKET FOR PACEMAKER N/A 4/24/2020 RELOCATE 2 [facility] performed by Berhane Deshpande MD at [address] CATH LAB  [place] RMVL TRANSVNS PM ELTRD 1 LEAD SYS ATR/VENTR N/A 4/24/2020 Remove Pacemaker Dual Leads performed by Berhane Deshpande MD at [facility] CARDIAC CATH LAB  [place] RMVL TRANSVNS PM ELTRD 1 LEAD SYS ATR/VENTR N/A 4/27/2020 REMOVE PACEMAKER DUAL LEADS performed by Berhane Deshpande MD at [address] CATH LAB  [place] TCAT INSJ/RPL PERM LEADLESS PACEMAKER RV W/IMG N/A 11/17/2020 INSERT OR REPLACE TRANSCATH PPM LEADLESS performed by Tony Kurtz MD at [address] CATH LAB  [place] TRABECULOPLASTY BY LASER SURGERY    
 US GUIDE ASP OVARIAN CYST ABD APPROACH  8.21.07  
 RIGHT Family History Problem Relation Age of Onset  Cancer Mother  Heart Disease Mother  Alcohol abuse Father  Diabetes Brother  Hypertension Brother History reviewed, no pertinent family history. Social History Tobacco Use  Smoking status: Never Smoker  Smokeless tobacco: Never Used Substance Use Topics  Alcohol use: Not Currently Allergies Allergen Reactions  Avapro [Irbesartan] Unable to Obtain  Bactrim [Sulfamethoxazole-Trimethoprim] Other (comments) Sore mouth  Contrast Agent [Iodine] Itching [address]  Morphine Nausea and Vomiting and Swelling  Penicillins Hives Did not tolerate cefepime 3/2020  Pravachol [Pravastatin] Nausea Only  Seafood [Shellfish Containing Products] Hives  Singulair [Montelukast] Other (comments)  
  palpitations  Ace Inhibitors Cough  Amlodipine Swelling  Captopril Cough  Carvedilol Diarrhea Current Facility-Administered Medications Medication Dose Route Frequency  fentaNYL (PF) 1,500 mcg/30 mL (50 mcg/mL) infusion  0-200 mcg/hr IntraVENous TITRATE  propofol (DIPRIVAN) 10 mg/mL infusion  0-50 mcg/kg/min IntraVENous TITRATE  chlorhexidine (ORAL CARE KIT) 0.12 % mouthwash 15 mL  15 mL Oral Q12H  clopidogreL (PLAVIX) tablet 75 mg  75 mg Oral DAILY  fentaNYL citrate (PF) injection 50 mcg  50 mcg IntraVENous Q2H PRN  
 fentaNYL citrate (PF) injection 25 mcg  25 mcg IntraVENous Q1H PRN  
 aspirin chewable tablet 81 mg  81 mg Per G Tube DAILY  insulin glargine (LANTUS) injection 15 Units  15 Units SubCUTAneous Q12H  
 insulin lispro (HUMALOG) injection   SubCUTAneous Q6H  
 dextrose (D50W) injection syrg 12.5-25 g  12.5-25 g IntraVENous PRN  
 EPINEPHrine (ADRENALIN) 10 mg in 0.9% sodium chloride 250 mL infusion  0-20 mcg/min IntraVENous TITRATE  
 NOREPINephrine (LEVOPHED) 32 mg in 5% dextrose 250 mL infusion  0.5-100 mcg/min IntraVENous TITRATE  famotidine (PF) (PEPCID) 20 mg in 0.9% sodium chloride 10 mL injection  20 mg IntraVENous Q24H  
 0.9% sodium chloride infusion 250 mL  250 mL IntraVENous PRN  
 calcium chloride 1 g in 0.9% sodium chloride 250 mL IVPB  1 g IntraVENous ONCE  
 sodium chloride (NS) flush 5-40 mL  5-40 mL IntraVENous Q8H  
 sodium chloride (NS) flush 5-40 mL  5-40 mL IntraVENous PRN  
 naloxone (NARCAN) injection 0.4 mg  0.4 mg IntraVENous PRN  
 0.9% sodium chloride infusion  50 mL/hr IntraVENous CONTINUOUS  
 vasopressin (VASOSTRICT) 20 Units in 0.9% sodium chloride 100 mL infusion  0-0.03 Units/min IntraVENous TITRATE  heparin (porcine) 2,000 Units in 0.9% sodium chloride 500 mL Irrigation    PRN  
 balsam peru-castor oiL (VENELEX) ointment   Topical BID  atorvastatin (LIPITOR) tablet 10 mg  10 mg Oral DAILY  [Held by provider] apixaban (ELIQUIS) tablet 5 mg  5 mg Oral Q12H  
 magic mouthwash cpd (without sucralfate)  5 mL Oral TID PRN  
 DOBUTamine (DOBUTREX) 500 mg/250 mL (2,000 mcg/mL) infusion  0-10 mcg/kg/min IntraVENous TITRATE  [Held by provider] carvediloL (COREG) tablet 3.125 mg  3.125 mg Oral BID WITH MEALS  
 alcohol 62% (NOZIN) nasal  1 Ampule  1 Ampule Topical Q12H  
 [Held by provider] bumetanide (BUMEX) tablet 1 mg  1 mg Oral BID  nitroglycerin (NITROSTAT) tablet 0.4 mg  0.4 mg SubLINGual PRN  
 sodium chloride (NS) flush 5-40 mL  5-40 mL IntraVENous Q8H  
 sodium chloride (NS) flush 5-40 mL  5-40 mL IntraVENous PRN  
 acetaminophen (TYLENOL) tablet 650 mg  650 mg Oral Q6H PRN Or  
 acetaminophen (TYLENOL) suppository 650 mg  650 mg Rectal Q6H PRN  polyethylene glycol (MIRALAX) packet 17 g  17 g Oral DAILY PRN  promethazine (PHENERGAN) tablet 12.5 mg  12.5 mg Oral Q6H PRN Or  
 ondansetron (ZOFRAN) injection 4 mg  4 mg IntraVENous Q6H PRN  
 glucose chewable tablet 16 g  4 Tab Oral PRN  
 dextrose (D50W) injection syrg 12.5-25 g  12.5-25 g IntraVENous PRN  
 glucagon (GLUCAGEN) injection 1 mg  1 mg IntraMUSCular PRN  
 
 
 
 LAB AND IMAGING FINDINGS:  
 
Lab Results Component Value Date/Time WBC 16.5 (H) 04/14/2021 03:57 AM  
 HGB 7.2 (L) 04/14/2021 09:54 AM  
 PLATELET 441 37/82/5485 03:57 AM  
 
Lab Results Component Value Date/Time  Sodium 134 (L) 04/14/2021 03:57 AM  
 Potassium 4.0 04/14/2021 03:57 AM  
 Chloride 106 04/14/2021 03:57 AM  
 CO2 19 (L) 04/14/2021 03:57 AM  
 BUN 40 (H) 04/14/2021 03:57 AM  
 Creatinine 2.43 (H) 04/14/2021 03:57 AM  
 Calcium 7.7 (L) 04/14/2021 03:57 AM  
 Magnesium 1.8 04/14/2021 03:57 AM  
 Phosphorus 5.7 (H) 04/14/2021 03:57 AM  
  
Lab Results Component Value Date/Time Alk. phosphatase 46 04/14/2021 01:38 AM  
 Protein, total 4.4 (L) 04/14/2021 01:38 AM  
 Albumin 2.0 (L) 04/14/2021 01:38 AM  
 Globulin 2.4 04/14/2021 01:38 AM  
 
Lab Results Component Value Date/Time INR 1.6 (H) 04/14/2021 09:54 AM  
 Prothrombin time 16.6 (H) 04/14/2021 09:54 AM  
 aPTT 40.3 (H) 04/14/2021 09:54 AM  
  
Lab Results Component Value Date/Time Iron 10 (L) 12/23/2020 05:29 AM  
 TIBC 301 12/23/2020 05:29 AM  
 Iron % saturation 3 (L) 12/23/2020 05:29 AM  
 Ferritin 17 (L) 12/23/2020 05:29 AM  
  
No results found for: PH, PCO2, PO2 No components found for: [place] Lab Results Component Value Date/Time CK 4,609 (H) 05/14/2020 05:15 AM  
 CK - MB 1.3 04/09/2014 11:00 AM  
  
 
 
   
 
Total time: 50  
Counseling / coordination time, spent as noted above: 40 
> 50% counseling / coordination?: y 
 
Prolonged service was provided for  []30 min   []75 min in face to face time in the presence of the patient, spent as noted above. Time Start:  
Time End:  
Note: this can only be billed with 27603 (initial) or 27892 (follow up). If multiple start / stop times, list each separately.

## 2021-04-14 NOTE — CARDIO/PULMONARY
Cardiac Rehab Note: chart review/referral  
  
Pt is a 64 yo admitted with NSTEMI, s/p cardiac cath with MVD, mod/severe MR, CTS consulted per NP entry. Cardiac cath with stent 4/13/21 EF 25%  on 4/7/21 per echo.  
  
Smoking history assessed. Patient is a non smoker. Smoking Cessation Program link has not been added to the AVS.  
  
Patient not seen at this time due to current condition as indicated in chart. Patient with blood loss issue at right groin site, intubated and sedated currently. CP Rehab will monitor.

## 2021-04-14 NOTE — ANESTHESIA POSTPROCEDURE EVALUATION
Procedure(s): RIGHT GROIN EXPLORATION; CONTROL OF BLEEDING. general 
 
Anesthesia Post Evaluation Multimodal analgesia: multimodal analgesia not used between 6 hours prior to anesthesia start to PACU discharge Patient location during evaluation: ICU Patient participation: complete - patient cannot participate (Adequate) Level of consciousness: obtunded/minimal responses Pain score: 0 Pain management: satisfactory to patient Airway patency: patent Anesthetic complications: no 
Cardiovascular status: acceptable and hypotensive Respiratory status: acceptable, ETT, intubated and ventilator Hydration status: acceptable Comments: +Post-Anesthesia Evaluation and Assessment Patient: Priya Sarabia MRN: [id number]  SSN: [id number] YOB: 1960  Age: 63 y.o. Sex: female Cardiovascular Function/Vital Signs BP (!) 85/56   Pulse 95   Temp (!) 35.8 °C (96.4 °F)   Resp 12   Ht 5' 10\" (1.778 m)   Wt 104.2 kg (229 lb 11.5 oz)   SpO2 100%   BMI 32.96 kg/m² Patient is status post Procedure(s): RIGHT GROIN EXPLORATION; CONTROL OF BLEEDING. Nausea/Vomiting: Controlled. Postoperative hydration reviewed and adequate. Pain: 
Pain Scale 1: Numeric (0 - 10) (04/13/21 2000) Pain Intensity 1: 10 (04/13/21 2000) Managed. Neurological Status: At baseline. Mental Status and Level of Consciousness: Arousable. Pulmonary Status:  
O2 Device: Other (comment)(vent) (04/13/21 2235) Adequate oxygenation and airway patent. Complications related to anesthesia: None Post-anesthesia assessment completed. No concerns. Signed By: Isa Florence MD  
 4/13/2021 Post anesthesia nausea and vomiting:  controlled INITIAL Post-op Vital signs:  
Vitals Value Taken Time BP 92/60 04/13/21 2340 Temp 35.4 °C (95.8 °F) 04/13/21 2342 Pulse 93 04/13/21 2342 Resp 0 04/13/21 2342 SpO2 100 % 04/13/21 2342 Vitals shown include unvalidated device data.

## 2021-04-14 NOTE — PROGRESS NOTES
[address] [facility], [address] [facility]  [phone] Cardiology Progress Note 4/14/2021 0840 AM  
 
Admit Date: 4/7/2021 Admit Diagnosis:  
Chest pain [R07.9] NSTEMI (non-ST elevated myocardial infarction) ([facility] [address] [I21.4] CHF (congestive heart failure) ([facility] [address] [I50.9] Subjective:  
 
Yadira Cedeno is intubated and sedated this am. Discussed case at length with RN this am.  
 
S/p PCI/ADDIS to mid LAD 4/13/2021. S/p right groin exploration/control of bleeding/evacuation of hematoma/placement of drains on 4/13/2021. The patient is on epi at 12 mcg, vaso. At 0.03, dobutamine at 10 mcg, and noreip at 40 mcg. Also on a touch of propofol at 20 mcg with PRN fentanyl for chronic pain management. PA pressures 46/27, /52, , CI/CO 2.3/5.4, SV02 69% at time of rounding. She has two YOANA drains to right groin; draining dark hilda blood. Hgb 8.8 this am, with repeat pending Hold eliquis, continue ASA and switch brilinta to plavix. Hold coreg. Appreciate vascular surgery involvement Visit Vitals BP (!) 113/43 Pulse 95 Temp 97.8 °F (36.6 °C) Resp (!) 0 Ht 5' 10\" (1.778 m) Wt 104.2 kg (229 lb 11.5 oz) SpO2 100% BMI 32.96 kg/m² Current Facility-Administered Medications Medication Dose Route Frequency  fentaNYL (PF) 1,500 mcg/30 mL (50 mcg/mL) infusion  0-200 mcg/hr IntraVENous TITRATE  propofol (DIPRIVAN) 10 mg/mL infusion  0-50 mcg/kg/min IntraVENous TITRATE  meropenem (MERREM) 500 mg in 0.9% sodium chloride (MBP/ADV) 50 mL MBP  500 mg IntraVENous Q8H  
 [START ON 4/15/2021] vancomycin (VANCOCIN) 1500 mg in  ml infusion  1,500 mg IntraVENous Q36H  
 0.9% sodium chloride infusion 250 mL  250 mL IntraVENous PRN  chlorhexidine (ORAL CARE KIT) 0.12 % mouthwash 15 mL  15 mL Oral Q12H  clopidogreL (PLAVIX) tablet 75 mg  75 mg Oral DAILY  fentaNYL citrate (PF) injection 50 mcg  50 mcg IntraVENous Q2H PRN  
 fentaNYL citrate (PF) injection 25 mcg  25 mcg IntraVENous Q1H PRN  
 0.9% sodium chloride infusion 250 mL  250 mL IntraVENous PRN  
 aspirin chewable tablet 81 mg  81 mg Per G Tube DAILY  sodium chloride (NS) flush 5-40 mL  5-40 mL IntraVENous Q8H  
 sodium chloride (NS) flush 5-40 mL  5-40 mL IntraVENous PRN  
 naloxone (NARCAN) injection 0.4 mg  0.4 mg IntraVENous PRN  
 0.9% sodium chloride infusion  50 mL/hr IntraVENous CONTINUOUS  
 NOREPINephrine (LEVOPHED) 8 mg in 5% dextrose 250mL (32 mcg/mL) infusion  0.5-100 mcg/min IntraVENous TITRATE  vasopressin (VASOSTRICT) 20 Units in 0.9% sodium chloride 100 mL infusion  0-0.03 Units/min IntraVENous TITRATE  
 0.9% sodium chloride infusion 250 mL  250 mL IntraVENous PRN  
 heparin (porcine) 2,000 Units in 0.9% sodium chloride 500 mL Irrigation    PRN  
 0.9% sodium chloride infusion 250 mL  250 mL IntraVENous PRN  
 0.9% sodium chloride infusion 250 mL  250 mL IntraVENous PRN  
 EPINEPHrine (ADRENALIN) 5 mg in 0.9% sodium chloride 250 mL infusion  0-20 mcg/min IntraVENous TITRATE  
 0.9% sodium chloride infusion 250 mL  250 mL IntraVENous PRN  
 0.9% sodium chloride infusion 250 mL  250 mL IntraVENous PRN  
 famotidine (PEPCID) tablet 20 mg  20 mg Oral BID  
 balsam peru-castor oiL (VENELEX) ointment   Topical BID  atorvastatin (LIPITOR) tablet 10 mg  10 mg Oral DAILY  [Held by provider] apixaban (ELIQUIS) tablet 5 mg  5 mg Oral Q12H  
 magic mouthwash cpd (without sucralfate)  5 mL Oral TID PRN  
 DOBUTamine (DOBUTREX) 500 mg/250 mL (2,000 mcg/mL) infusion  0-10 mcg/kg/min IntraVENous TITRATE  [Held by provider] carvediloL (COREG) tablet 3.125 mg  3.125 mg Oral BID WITH MEALS  insulin glargine (LANTUS) injection 10 Units  10 Units SubCUTAneous QHS  [Held by provider] insulin lispro (HUMALOG) injection 5 Units  5 Units SubCUTAneous TIDAC  alcohol 62% (NOZIN) nasal  1 Ampule  1 Ampule Topical Q12H  
 [Held by provider] bumetanide (BUMEX) tablet 1 mg  1 mg Oral BID  nitroglycerin (NITROSTAT) tablet 0.4 mg  0.4 mg SubLINGual PRN  
 sodium chloride (NS) flush 5-40 mL  5-40 mL IntraVENous Q8H  
 sodium chloride (NS) flush 5-40 mL  5-40 mL IntraVENous PRN  
 acetaminophen (TYLENOL) tablet 650 mg  650 mg Oral Q6H PRN Or  
 acetaminophen (TYLENOL) suppository 650 mg  650 mg Rectal Q6H PRN  polyethylene glycol (MIRALAX) packet 17 g  17 g Oral DAILY PRN  promethazine (PHENERGAN) tablet 12.5 mg  12.5 mg Oral Q6H PRN Or  
 ondansetron (ZOFRAN) injection 4 mg  4 mg IntraVENous Q6H PRN  
 insulin lispro (HUMALOG) injection   SubCUTAneous AC&HS  
 glucose chewable tablet 16 g  4 Tab Oral PRN  
 dextrose (D50W) injection syrg 12.5-25 g  12.5-25 g IntraVENous PRN  
 glucagon (GLUCAGEN) injection 1 mg  1 mg IntraMUSCular PRN Objective:  
  
Physical Exam: 
Gen: chronically ill appearing  female, ashen/yellow appearance intubated and sedated Abdomen: soft, non-tender. Bowel sounds hypoactive, obese Extremities: left leg BKA, right leg trace edema, discolored, large soft hematoma to groin. Two YOANA drains in place draining sanguinous fluid. Dorsalis pulse palpable. Heart: regular rate and rhythm, no murmur, S3/JVD Lungs: diminished throughout Neck: supple, symmetrical, trachea midline Neurologic: sedated and intubated Data Review:  
Recent Labs 04/14/21 
5033 04/14/21 
3212 04/14/21 
0138 04/13/21 
1847 WBC  --  16.5* 12.5* 5.4 HGB 7.2* 8.8* 10.8* 9.5* HCT 21.4* 26.3* 32.4* 30.5* PLT  --  156 169 200 Recent Labs 04/14/21 
7692 04/14/21 
8300 04/14/21 
0138 04/13/21 
9054 04/12/21 
7974 NA  --  134* 135* 134* 135* K  --  4.0 3.9 3.8 3.6 CL  --  106 105 101 103 CO2  --  19* 20* 25 26 GLU  --  254* 260* 172* 104* BUN  --  40* 41* 45* 47* CREA  --  2.43* 2.42* 2.76* 2.70* CA  --  7.7* 8.0* 8.7 8.4* MG  --  1.8 1.8 2.0 2.2 PHOS  --  5.7* 5.3*  --  3.9 ALB  --   --  2.0* 2.3* 2.4*  
TBILI  --   --  1.7* 1.2* 1.4* ALT  --   --  16 11* 16 INR 1.6* 1.7*  --  1.3*  -- No results for input(s): TROIQ, CPK, CKMB in the last 72 hours. Intake/Output Summary (Last 24 hours) at 4/14/2021 1101 Last data filed at 4/14/2021 1100 Gross per 24 hour Intake 12359.29 ml Output 2910 ml Net 8919.29 ml  
  
 
Cardiographics 
  Telemetry: Paced ECG: Paced, no acute changes Echocardiogram: 3/1/2021 · LV: Estimated LVEF is 50 - 55%. Visually measured ejection fraction. Normal cavity size. Upper normal wall thickness. Low normal systolic function. · MV: Mitral annular calcification. Mild to moderate mitral valve regurgitation is present. · TV: Mild to moderate tricuspid valve regurgitation is present. · PA: Pulmonary arterial systolic pressure is 44 mmHg. · Image quality for this study was technically difficult. New ECHO 4/7/2021:  
· LV: Estimated LVEF is 20 - 25%. Normal cavity size. Moderate concentric hypertrophy. Severely reduced systolic function. · RV: Dilated right ventricle. Mildly reduced systolic function. Pacing wire present · MV: Mitral valve non-specific thickening. Mild mitral annular calcification. Moderate to severe mitral valve regurgitation is present. · TV: Mild tricuspid valve regurgitation is present. · PA: Moderate pulmonary hypertension. Pulmonary arterial systolic pressure is 47 mmHg. · RA: Mildly dilated right atrium. Repeat echo 4/11/2021 on dobutamine: 
· LV: Estimated LVEF is 25 - 30%. Normal cavity size. Moderate concentric hypertrophy. Moderate-to-severely reduced systolic function. Pacemaker. . 
· LA: Moderately dilated left atrium. · RV: Dilated right ventricle. Borderline low systolic function. Pacing wire present · RA: Moderately dilated right atrium. · AV: Mild aortic valve focal thickening present. · MV: Mitral valve non-specific thickening. Mild mitral annular calcification. Mild to moderate mitral valve regurgitation is present. · TV: Mild tricuspid valve regurgitation is present. · PA: Moderate pulmonary hypertension. Pulmonary arterial systolic pressure is 52 mmHg. CXRAY: \"no acute findings\" Assessment:  
 
Active Problems: 
  Chest pain (11/23/2020) CHF (congestive heart failure) ([facility] [address] (4/7/2021) NSTEMI (non-ST elevated myocardial infarction) ([facility] [address] (4/7/2021) S/P cardiac cath (4/8/2021) Overview: 4/13/2021: s/p PCI/ADDIS to mid LAD x 1  
    4/8/2021: cardiac cath showed MVD. Mod/severe MR, elevated PA pressures Plan:  
Isaac Vazquez is a 63 y.o. female with a PMH significant for chronic PAF, right arm lymphadema (s/p lymphectomy), bilateral mastectomy for breast CA, HTN. HLD, neuropathy, left BKA, SSS, DM II, CKD, Asthma, A-flutter, long term use of OAC, endocarditis  admitted for Chest pain [R07.9] NSTEMI (non-ST elevated myocardial infarction) ([facility] [address] [I21.4] CHF (congestive heart failure) ([facility] [address]    
 
 
 
NSTEMI: s/p cardiac cath which showed MVD, mod/severe MR, and PHTN, cardiogenic shock due to ischemic cardiomyopathy: 
Troponin peaked at 55, trended down to 28, no further trending necessary. Cardiac index improved from 1.1- Mixed venous oxygen saturation is 80% and cardiac index 2.2 on rounds today. PA pressures in the 40s over low 20s. ECHO showed new onset ICM; EF 20-25%. Repeat on dobutamine 4/11/2021 showed EF of 25-30, mitral regurgitation reduced to mild to moderate. Patient declined for cardiac surgery due to prohibitively high riskinitiate Brilinta in preparation for possible high risk PCI when optimized She does understand a significantly higher than average risk of contrast-induced nephropathy that could progress to end-stage renal disease requiring dialysis, and a higher than average risk of myocardial infarction and death with her severe ischemic cardiomyopathy with cardiogenic shock currently on pressors.  
I discussed the risks and benefits of cardiac catheterization and PCI plus or minus Impella catheter with the patient who expressed understanding and wishes to proceed. Patient underwent successful PCI/ADDIS to mid LAD 4/13/21 without need for impella support. Was pre-medicated for IV contrast allergy. Re-admitted to ICU after procedure. Developed hematoma overnight after sheath pull, taken to OR by vascular surgery. Hemodynamically unstable, started on multiple pressors as lsited above. Will wean as tolerated. Hold bumex, switch brilinta to plavix, closely monitor YOANA drain outpt. Hold Eliquis Monitor H/H, hemoynamics, YOANA drain outpt. Record every hour. Mild/mod. mitral regurgitation: 
ECHO on 4/11/2021 showed mild/mod MR 
  
Acute on chronic systolic HF, with cardiogenic shock Continue pressor support to help maintain hemodynamic stability Monitor and document hemodynamics every hour Please titrate to MAP equal to 80. CI 2-2.2 is ok. Diuresing well with about 6.8 L negative, creatinine stable, down to 2.43 CXray ok this am, will continue to hold diuretic and dose as needed. Parox A-fib s/p AV node ablation   
Coreg held with cardiogenic shock on pressors Hold eliquis Hypertension: controlled, actually a little low normal after sedation Monitor closely. Hold bumex, hold coreg Continue pressor support 
  
PARAG on Chronic kidney disease stage IV : baseline 2-2.1 Renal function improved with dobutamine and improved cardiac output. Avoid nephrotoxic substances Follow BMP-Cr 2.76 Nephrology following, diuresing for congestion 
  
DM II: 
Manage per internal medicine  
  
HLD:  
LDL 44 on 4/11/2021 Starting low-dose statin due to three-vessel disease Noted his history of questionable nausea only with pravastatin in 2010, and that statin may have been held due to elevated LFTs, but LFTs are currently normal 
  
Anxiety:   
Continue zoloft -on hold  
  
Chronic pain: 
Management as per pulmonary critical care and internal medicine Acute blood loss anemia:  
Has received 7 units PRBC's thus far, 1 pending FFP x 2, and platelets x 1 Monitor H/H, transfuse as needed to keep hgb greater than 7 Monitor and document YOANA output q 1 hour, notify provider if increases Please see Dr. Binh Almazan addendum. Lars Bryant [id number],NO 
DNP,APRN,AG-ACNP-BC Patient seen and examined by me with the above nurse practitioner. I personally performed all components of the history, physical, and medical decision making and agree with the assessment and plan with minor modifications as noted. Successful high risk PCI via the right femoral artery yesterday as her radial arteries are too small to pass a catheter, proven by previous catheterization with attempted passage of a 4/5 French sheath that was unable to pass. Used ultrasound guidance and micropuncture needle. Right femoral angiogram showed CFA insertion site without extravasation, pristine angiogram.  Chose not to seal the groin as clinical trials have not shown reduction in femoral complications with closure devices and because of the stick was relatively high which could hypothetically predispose to high risk of intraperitoneal bleeding. Instead favored manual compression with Fem stop after the case which was performed. Unfortunately developed massive hematoma requiring surgical exploration, evacuation, and repair of the artery. Dr. Seth Haq noted very friable tissues. She continues to bleed and required pressors and blood products. Discussed with Dr. Seth Haq at the bedside. General PE Gen: Sedated, intubated HEENT: 
PERRL, no carotid bruits or JVD Chest and Lung Exam  
Inspection: Accessory muscles - No use of accessory muscles in breathing. Auscultation:  
Breath sounds: - Normal.  
Cardiovascular Inspection: Jugular vein - Bilateral - Inspection Normal.  
Palpation/Percussion:  
Apical Impulse: - Normal.  
Auscultation: Rhythm - Regular. Heart Sounds - S1 WNL and S2 WNL. No S3 or S4.   
Murmurs & Other Heart Sounds: Auscultation of the heart reveals - No Murmurs. Peripheral Vascular Upper Extremity: Inspection - Bilateral - No Cyanotic nailbeds or Digital clubbing. Lower Extremity:  
Palpation: Edema - Bilateral - No edema. Right groin with large hematoma, surgical dressing clean dry and intact, with 2 YOANA drains with significant drainage of bright red blood noted Abdomen:   Soft, non-tender, bowel sounds are active. Neuro: A&O times 3, CN and motor grossly WNL Successful high risk PCI via the right femoral artery yesterday as her radial arteries are too small to pass a catheter, proven by previous catheterization with attempted passage of a 4/5 French sheath that was unable to pass. Used ultrasound guidance and micropuncture needle. Right femoral angiogram showed CFA insertion site without extravasation, pristine angiogram.  Chose not to seal the groin as clinical trials have not shown reduction in femoral complications with closure devices and because of the stick was relatively high which could hypothetically predispose to high risk of intraperitoneal bleeding if a closure device was to be used. Instead favored manual compression with Fem stop after the case which was performed. Unfortunately developed massive hematoma requiring surgical exploration, evacuation, and repair of the artery. Dr. Get Taylor noted very friable tissues. She continues to bleed and required pressors and blood products. Discussed with Dr. Get Taylor at the bedside. CAD status post PCI to the culprit LAD 90% stenosis: Must continue DAPT 1 day after stenting of the LAD. Aspirin 81 mg and will change Brilinta to Plavix for slightly less antiplatelet effect. Groin hematoma: 
Surgical exploration and treatment per vascular surgery. Appreciate Dr. Salo Bowers assistance. Support with pressors and blood products as needed. Frequent CBCs. Cardiogenic shock, now complicated by blood loss anemia as above.  
 
I discussed patient's critical illness with her  at length today and answered all questions as per separate note earlier today. I told him we are doing everything we can to save her life. He discussed with palliative care decided to make the patient DO NOT RESUSCITATE, but we will continue with aggressive supportive medical care in the meantime. Greater than 30 minutes of critical care time spent at the bedside exclusive of procedures.

## 2021-04-14 NOTE — PROGRESS NOTES
Transition of Care Plan: 
  
RUR: 40% Disposition: Home with family Follow up appointments: PCP, Cardiology DME needed: none Transportation at Discharge:  [facility] or  to provide at d/c  
Keys or means to access home:   has access to home IM Medicare letter:  N/A Caregiver Contact: - Julieta si- [phone] Discharge Caregiver contacted prior to discharge? CM to contact at d/c 
  
Pt w/ NSTEMI is not candidate for CABG, underwent high risk PCI - now with R groin hematoma from arterial bleed - s/p exploration/evacuation and arterial ligation. Multipressor support; intubation CM will remain avbl to assist as needed. [place] Gunderson, MSW

## 2021-04-14 NOTE — PROGRESS NOTES
[id number] [facility] Adult  Hospitalist Group Critical Care Progress Note Kamala Gillespie MD 
Answering service: [phone] OR [phone] from in house phone Date of Service:  2021 NAME:  Reza South :  1960 MRN:  [id number] Interval history / Subjective: On multi pressor support, intubated, still bleeding based on hematoma size Assessment & Plan:  
 
NSTEMI: with multivessel disease, not a candidate for CABG per CTS. On dobutamine and milrinone for decompensated heart failure Management per cards, continue Deonna Wade  high risk PCI  got stent to LAD Right Groin Hematoma from Arterial Bleed sp exploration/evacuation and arterial ligation - Multiple PRBCs, FFP and platelets ordered - Fu labs - Multipressor support - Made DNR by HCS.   
Acute decompensated heart failure with cardiogenic shock. Continue milrinone and dobutamine drip Continue diuresis as tolerated Closely monitor hemodynamics, has PA catheter 
  
Paroxysmal AF Rate controlled. Apixaban on hold presently 
  
PARAG on CKD4. Cr slightly improved  Cardiorenal syndrome Nephrology following 
  
Elevated LFTs - likely hepatic congestion DM2: Lantus; Lispro w meals; SSI HLD: Holding statin due to transaminitis Anxiety: Zoloft 
  
  
I personally spent   60   minutes of critical care time.  This is time spent at this critically ill patient's bedside actively involved in patient care as well as the coordination of care and discussions with the patient's family.  This does not include any procedural time which has been billed separately. 
   
 
Hospital Problems  Date Reviewed: 3/1/2021 Codes Class Noted POA  
 DNR (do not resuscitate) discussion ICD-10-CM: Z71.89 ICD-9-CM: V65.49  Unknown Unknown Goals of care, counseling/discussion ICD-10-CM: Z71.89 ICD-9-CM: V65.49  Unknown Unknown Need for emotional support ICD-10-CM: R45.89 ICD-9-CM: 799.29  Unknown Unknown Shortness of breath ICD-10-CM: R06.02 
ICD-9-CM: 786.05  Unknown Unknown S/P cardiac cath ICD-10-CM: Z98.890 ICD-9-CM: V45.89  4/8/2021 Unknown Overview Addendum 4/13/2021  2:57 PM by Danielle Trevino NP  
  4/13/2021: s/p PCI/ADDIS to mid LAD x 1  
4/8/2021: cardiac cath showed MVD. Mod/severe MR, elevated PA pressures CHF (congestive heart failure) ([facility]) ICD-10-CM: I50.9 ICD-9-CM: 428.0  4/7/2021 Unknown NSTEMI (non-ST elevated myocardial infarction) ([facility] [address] ICD-10-CM: I21.4 ICD-9-CM: 410.70  4/7/2021 Unknown Chest pain ICD-10-CM: R07.9 ICD-9-CM: 786.50  11/23/2020 Unknown Review of Systems:  
Review of systems not obtained due to patient factors. Vital Signs:  
 Last 24hrs VS reviewed since prior progress note. Most recent are: 
Visit Vitals BP (!) 121/56 Pulse 92 Temp 97 °F (36.1 °C) Resp (!) 0 Ht 5' 10\" (1.778 m) Wt 104.2 kg (229 lb 11.5 oz) SpO2 100% BMI 32.96 kg/m² Intake/Output Summary (Last 24 hours) at 4/14/2021 1754 Last data filed at 4/14/2021 1720 Gross per 24 hour Intake 51298.69 ml Output 3030 ml Net 53807.69 ml Physical Examination:  
 
 
     
Constitutional:  sedated ENT:  Oral mucous moist, oropharynx benign. Resp:  Intubated crackles bilaterally. No wheezing/rhonchi/rales. No accessory muscle use CV:  Regular rhythm, normal rate, no murmurs, gallops, rubs GI:  Soft, non distended, non tender. normoactive bowel sounds, no hepatosplenomegaly Musculoskeletal:  Large R Inguinal and thigh hematoma Neurologic:  sedated Psych:  sedated Skin:  R inguinal groin underlying hematoma Hematologic/Lymphatic/Immunlogic:  No jaundice nor lymph node swelling :  deferred Eyes:  sedated Data Review:  
 Review and/or order of clinical lab test 
 
 
Labs:  
 
Recent Labs   04/14/21 [phone] 04/14/21 
9218 04/14/21 
0357 04/14/21 
9747 WBC  --   --  16.5* 12.5* HGB 8.3* 7.2* 8.8* 10.8* HCT 24.3* 21.4* 26.3* 32.4* PLT  --   --  156 169 Recent Labs 04/14/21 
6023 04/14/21 
0138 04/13/21 
2612 04/12/21 
4369 * 135* 134* 135* K 4.0 3.9 3.8 3.6  105 101 103 CO2 19* 20* 25 26 BUN 40* 41* 45* 47* CREA 2.43* 2.42* 2.76* 2.70* * 260* 172* 104* CA 7.7* 8.0* 8.7 8.4* MG 1.8 1.8 2.0 2.2 PHOS 5.7* 5.3*  --  3.9 Recent Labs 04/14/21 
4106 04/13/21 
4274 04/12/21 
1310 ALT 16 11* 16  
AP 46 69 74 TBILI 1.7* 1.2* 1.4* TP 4.4* 5.5* 5.8* ALB 2.0* 2.3* 2.4*  
GLOB 2.4 3.2 3.4 Recent Labs 04/14/21 
1713 04/14/21 
0954 04/14/21 
0357 04/13/21 
0447 04/12/21 
1020 04/12/21 
1020 INR 1.4* 1.6* 1.7* 1.3*   < >  --   
PTP 14.5* 16.6* 17.7* 13.0*   < >  --   
APTT  --  40.3*  --  94.3*  --  67.7*  
 < > = values in this interval not displayed. No results for input(s): FE, TIBC, PSAT, FERR in the last 72 hours. Lab Results Component Value Date/Time Folate 8.5 03/14/2020 02:49 PM  
  
No results for input(s): PH, PCO2, PO2 in the last 72 hours. No results for input(s): CPK, CKNDX, TROIQ in the last 72 hours. No lab exists for component: CPKMB Lab Results Component Value Date/Time Cholesterol, total 79 04/11/2021 04:25 AM  
 HDL Cholesterol 18 04/11/2021 04:25 AM  
 LDL, calculated 44.6 04/11/2021 04:25 AM  
 Triglyceride 82 04/11/2021 04:25 AM  
 CHOL/HDL Ratio 4.4 04/11/2021 04:25 AM  
 
Lab Results Component Value Date/Time Glucose (POC) 306 (H) 04/14/2021 11:33 AM  
 Glucose (POC) 266 (H) 04/14/2021 08:44 AM  
 Glucose (POC) 249 (H) 04/13/2021 11:51 PM  
 Glucose (POC) 154 (H) 04/13/2021 05:58 PM  
 Glucose (POC) 182 (H) 04/13/2021 01:15 PM  
 
Lab Results Component Value Date/Time  Color YELLOW/STRAW 12/19/2020 04:48 PM  
 Appearance CLOUDY (A) 12/19/2020 04:48 PM  
 Specific gravity 1.013 12/19/2020 04:48 PM pH (UA) 5.5 12/19/2020 04:48 PM  
 Protein 100 (A) 12/19/2020 04:48 PM  
 Glucose Negative 12/19/2020 04:48 PM  
 Ketone Negative 12/19/2020 04:48 PM  
 Bilirubin Negative 12/19/2020 04:48 PM  
 Urobilinogen 1.0 12/19/2020 04:48 PM  
 Nitrites Positive (A) 12/19/2020 04:48 PM  
 Leukocyte Esterase TRACE (A) 12/19/2020 04:48 PM  
 Epithelial cells FEW 12/19/2020 04:48 PM  
 Bacteria 4+ (A) 12/19/2020 04:48 PM  
 WBC 5-10 12/19/2020 04:48 PM  
 RBC 0-5 12/19/2020 04:48 PM  
 
 
 
Medications Reviewed:  
 
Current Facility-Administered Medications Medication Dose Route Frequency  fentaNYL (PF) 1,500 mcg/30 mL (50 mcg/mL) infusion  0-200 mcg/hr IntraVENous TITRATE  propofol (DIPRIVAN) 10 mg/mL infusion  0-50 mcg/kg/min IntraVENous TITRATE  chlorhexidine (ORAL CARE KIT) 0.12 % mouthwash 15 mL  15 mL Oral Q12H  clopidogreL (PLAVIX) tablet 75 mg  75 mg Oral DAILY  fentaNYL citrate (PF) injection 50 mcg  50 mcg IntraVENous Q2H PRN  
 fentaNYL citrate (PF) injection 25 mcg  25 mcg IntraVENous Q1H PRN  
 aspirin chewable tablet 81 mg  81 mg Per G Tube DAILY  insulin glargine (LANTUS) injection 15 Units  15 Units SubCUTAneous Q12H  
 insulin lispro (HUMALOG) injection   SubCUTAneous Q6H  
 dextrose (D50W) injection syrg 12.5-25 g  12.5-25 g IntraVENous PRN  
 EPINEPHrine (ADRENALIN) 10 mg in 0.9% sodium chloride 250 mL infusion  0-20 mcg/min IntraVENous TITRATE  
 NOREPINephrine (LEVOPHED) 32 mg in 5% dextrose 250 mL infusion  0.5-100 mcg/min IntraVENous TITRATE  famotidine (PF) (PEPCID) 20 mg in 0.9% sodium chloride 10 mL injection  20 mg IntraVENous Q24H  
 0.9% sodium chloride infusion 250 mL  250 mL IntraVENous PRN  
 calcium chloride 1 g in 0.9% sodium chloride 250 mL IVPB  1 g IntraVENous ONCE  
 sodium chloride (NS) flush 5-40 mL  5-40 mL IntraVENous Q8H  
 sodium chloride (NS) flush 5-40 mL  5-40 mL IntraVENous PRN  
 naloxone (NARCAN) injection 0.4 mg  0.4 mg IntraVENous PRN  
 0.9% sodium chloride infusion  50 mL/hr IntraVENous CONTINUOUS  
 vasopressin (VASOSTRICT) 20 Units in 0.9% sodium chloride 100 mL infusion  0-0.03 Units/min IntraVENous TITRATE  heparin (porcine) 2,000 Units in 0.9% sodium chloride 500 mL Irrigation    PRN  
 balsam peru-castor oiL (VENELEX) ointment   Topical BID  atorvastatin (LIPITOR) tablet 10 mg  10 mg Oral DAILY  [Held by provider] apixaban (ELIQUIS) tablet 5 mg  5 mg Oral Q12H  
 magic mouthwash cpd (without sucralfate)  5 mL Oral TID PRN  
 DOBUTamine (DOBUTREX) 500 mg/250 mL (2,000 mcg/mL) infusion  0-10 mcg/kg/min IntraVENous TITRATE  [Held by provider] carvediloL (COREG) tablet 3.125 mg  3.125 mg Oral BID WITH MEALS  
 alcohol 62% (NOZIN) nasal  1 Ampule  1 Ampule Topical Q12H  
 [Held by provider] bumetanide (BUMEX) tablet 1 mg  1 mg Oral BID  nitroglycerin (NITROSTAT) tablet 0.4 mg  0.4 mg SubLINGual PRN  
 sodium chloride (NS) flush 5-40 mL  5-40 mL IntraVENous Q8H  
 sodium chloride (NS) flush 5-40 mL  5-40 mL IntraVENous PRN  
 acetaminophen (TYLENOL) tablet 650 mg  650 mg Oral Q6H PRN Or  
 acetaminophen (TYLENOL) suppository 650 mg  650 mg Rectal Q6H PRN  polyethylene glycol (MIRALAX) packet 17 g  17 g Oral DAILY PRN  promethazine (PHENERGAN) tablet 12.5 mg  12.5 mg Oral Q6H PRN Or  
 ondansetron (ZOFRAN) injection 4 mg  4 mg IntraVENous Q6H PRN  
 glucose chewable tablet 16 g  4 Tab Oral PRN  
 dextrose (D50W) injection syrg 12.5-25 g  12.5-25 g IntraVENous PRN  
 glucagon (GLUCAGEN) injection 1 mg  1 mg IntraMUSCular PRN  
 
______________________________________________________________________ EXPECTED LENGTH OF STAY: 3d 22h ACTUAL LENGTH OF STAY:          7 Pia Carpio MD

## 2021-04-14 NOTE — PROGRESS NOTES
Patient seen at the bedside with nurse practitioner Sarah Peralta and the patient's nurse as well as Dr. Lori Joel. Trying to wean vasopressin, giving albumin, FFP is being given as well. Repeat hemoglobin for this morning ordered. Impressive blood loss despite hematoma evacuation and repair of the vessel. Must continue aspirin 81 mg and will change Brilinta to Plavix for slightly less antiplatelet effect, with fresh drug-eluting stent to the LAD yesterday. Holding off on diuresis with blood loss. Full note to follow. I spoke with with Mr. Xiomara Blanco, and explained that she is critically ill and that we are doing everything we can to support her through this. I answered all of his questions. Advised him of the phone number for the nursing unit and that if any questions arise, please call his nurse and she could page me.

## 2021-04-14 NOTE — PROGRESS NOTES
Intubated and sedated Critically ill Last Hgb = 8.8 Thigh soft Foot viable Medial drain with significant outpt still (70cc last hour) Lateral drain scant Being transfused and coags being corrected 
 
con't supportive care

## 2021-04-14 NOTE — BRIEF OP NOTE
Brief Postoperative Note Patient: Layton Hale YOB: 1960 MRN: [id number] Date of Procedure: 4/13/2021 Pre-Op Diagnosis: Right groin hematoma/bleed Post-Op Diagnosis: same    
 
ocedure(s): RIGHT GROIN EXPLORATION/CONTROL OF BLEEDING 
                      EVACUATION OF HEMATOMA/PLACEMENT OF DRAINS Surgeon(s): 
Shelbi Beauchamp MD 
 
Surgical Assistant: Surg Asst-1: Ally Toledo Anesthesia: General  
 
Estimated Blood Loss (mL): 1000cc evacuated hematoma + bleeding Complications: none Drains: 10mm JPs x 2 Findings: massive arterial hematoma under pressure from femoral artery access Electronically Signed by Arjun Eddy MD on 4/13/2021 at 10:12 PM

## 2021-04-14 NOTE — ANESTHESIA PREPROCEDURE EVALUATION
Relevant Problems No relevant active problems Anesthetic History PONV Review of Systems / Medical History Patient summary reviewed, nursing notes reviewed and pertinent labs reviewed Pulmonary Asthma Neuro/Psych Within defined limits Cardiovascular Hypertension Valvular problems/murmurs: mitral insufficiency CHF Dysrhythmias : atrial fibrillation Pacemaker (PM-sss), CAD and hyperlipidemia Exercise tolerance: <4 METS Comments: Sepsis secondary to left foot osteomyelitis with positive blood cultures and demonstrated vegetation on pacemaker lead by MIKE on 4/23/20. S/P A-fib ablation (2012) Sick Sinus Syndrome MIKE (4/23/20): ·Normal cavity size, wall thickness, systolic function (ejection fraction normal) and diastolic function. Estimated left ventricular ejection fraction is 25-30%. No regional wall motion abnormality noted. Normal left ventricular strain. ·Lambl's excrescences visualized on the valve. ·Mitral valve non-specific thickening. Mild to moderate mitral valve regurgitation is present GI/Hepatic/Renal 
  
 
 
Renal disease: CRI Comments: S/P Gastric bypass CRI, Stage IV Endo/Other Diabetes: well controlled, type 2, using insulin Obesity, arthritis and cancer (breast) Pertinent negatives: No morbid obesity Other Findings Comments: Sepsis Non-pressure chronic ulcer of left foot Left foot osteomyelitis Right arm lymphedema Neck and back surgery Groin hematoma post catheterization. Physical Exam 
 
Airway Mallampati: II 
TM Distance: 4 - 6 cm Neck ROM: normal range of motion Mouth opening: Normal 
 
 Cardiovascular Regular rate and rhythm,  S1 and S2 normal,  no murmur, click, rub, or gallop Dental 
 
Dentition: Poor dentition and Upper partial plate Comments: Several missing lower teeth, none loose. Pulmonary Breath sounds clear to auscultation  Abdominal 
GI exam deferred Other Findings Anesthetic Plan ASA: 4, emergent Anesthesia type: general 
 
Monitoring Plan: BIS Induction: Intravenous Anesthetic plan and risks discussed with: Patient

## 2021-04-14 NOTE — PERIOP NOTES
9:45 PM 
 
= 1 Bottle of IRRISEPT for PRN Irrigation used by MD Kaylah Victoria during Pt. Procedure. + REF. #: [id number] + LOT. #: [id number] 
+ EXP. Date: 12/31/2022

## 2021-04-14 NOTE — PROGRESS NOTES
Comprehensive Nutrition Assessment Type and Reason for Visit: Initial, RD nutrition re-screen/LOS Nutrition Recommendations/Plan:  
Consider initiation of TF once pressor needs decrease (RD to provide TF recommendations prn) Nutrition Assessment:   Pt admitted with chest pain. PMH: L BKA, gastric bypass (2008), HTN, CKD stage 3, CHF, DM, breast CA. Chart reviewed for LOS, case discussed during CCU rounds. Pt s/p stent in cath lab yesterday. She developed a massive hematoma that had to be surgically intubated and is presently intubated and sedated with propofol @ 21.9mL/h, which provides 579 kcals daily. Epi at 12, dobutamine at 10 and levo at 36. Per ASPEN guidelines TF is contraindicated currently 2' >1 pressor. OGT is clamped. MST was positive for wt loss and poor appetite PTA (but no amount specified so no trigger was sent). Her appetite appears to have been poor prior to yesterday. Will monitor readiness for nutrition support. Patient Vitals for the past 168 hrs: 
 % Diet Eaten 04/11/21 1845 1 - 25% 04/11/21 1400 1 - 25% 04/11/21 0900 26 - 50% 04/10/21 1400 1 - 25% Malnutrition Assessment: 
Malnutrition Status:     UTD at this time Estimated Daily Nutrient Needs: 
Energy (kcal): PSU 1876 (MSJ 1439); Weight Used for Energy Requirements: Current Protein (g): 83-104g (0.8-1gPro/kg); Weight Used for Protein Requirements: Current Fluid (ml/day): 1900mL; Method Used for Fluid Requirements: 1 ml/kcal 
 
 
Nutrition Related Findings:  Meds: pepcid, lantus, humalog, [email or web address]; Drips: propofol, fentanyl, dobutamine, levo, epi. Edema: trace-all extremities. BM 4/12 Wounds:   
Surgical incision Current Nutrition Therapies: 
No diet orders on file Anthropometric Measures: 
· Height:  5' 10\" (177.8 cm) · Current Body Wt:  104.2 kg (229 lb 11.5 oz) · Ideal Body Wt:  150 lbs:  153.1 % · BMI Category:  Obese class 1 (BMI 30.0-34. 9) Nutrition Diagnosis: · Inadequate protein-energy intake related to impaired respiratory function as evidenced by NPO or clear liquid status due to medical condition Nutrition Interventions:  
Food and/or Nutrient Delivery: Start tube feeding Nutrition Education and Counseling: No recommendations at this time Coordination of Nutrition Care: Continue to monitor while inpatient, Interdisciplinary rounds Goals: 
Plan of care will be determined re: nutrition in 1-3 days. Nutrition Monitoring and Evaluation:  
Behavioral-Environmental Outcomes: None identified Food/Nutrient Intake Outcomes: IVF intake Physical Signs/Symptoms Outcomes: Biochemical data, Nutrition focused physical findings, Skin, Weight, GI status, Fluid status or edema, Hemodynamic status Discharge Planning: Too soon to determine Electronically signed by Sangeetha Rain RD, [address] [place]  on 4/14/2021 at 2:47 PM 
 
Contact: [facility]

## 2021-04-15 NOTE — PROGRESS NOTES
04/15/21 8842 ABCDEF Bundle SBT Safety Screen Passed No  
SBT Screen Reason for Failure Vasopressor use

## 2021-04-15 NOTE — PROGRESS NOTES
[address] [facility] Adult  Hospitalist Group Critical Care Progress Note Karishma Urena MD 
Answering service: [phone] OR [phone] from in house phone Date of Service:  4/15/2021 NAME:  Keith Lopez :  1960 MRN:  [id number] Interval history / Subjective:  
  Getting 1 PRBC, vasopressor support remains about the same, Sedated on mechanical ventilation. Assessment & Plan:  
 
NSTEMI: with multivessel disease, not a candidate for CABG per CTS. On dobutamine  for decompensated heart failure Management per cards, continue Jimena Wade  high risk PCI  got stent to LAD 
  
Right Groin Hematoma from Arterial Bleed sp exploration/evacuation and arterial ligation - Multiple PRBCs, FFP and platelets ordered - Fu labs - Multipressor support - Made DNR by HCS.   
  
Acute decompensated heart failure with cardiogenic shock. Dobutamine drip Continue diuresis as tolerated Closely monitor hemodynamics, has PA catheter - Will attempt US guided Left radial A line placement 
  
Paroxysmal AF Rate controlled. Apixaban on hold presently 
  
PARAG on CKD4. Cr slightly improved  Cardiorenal syndrome Nephrology following 
  
Elevated LFTs - likely hepatic congestion DM2: Lantus; Lispro w meals; SSI HLD: Holding statin due to transaminitis Anxiety: Zoloft 
  
  
I personally spent   55   minutes of critical care time.  This is time spent at this critically ill patient's bedside actively involved in patient care as well as the coordination of care and discussions with the patient's family.  This does not include any procedural time which has been billed separately. 
   
 
Hospital Problems  Date Reviewed: 3/1/2021 Codes Class Noted POA  
 DNR (do not resuscitate) discussion ICD-10-CM: Z71.89 ICD-9-CM: V65.49  Unknown Unknown  Goals of care, counseling/discussion ICD-10-CM: Z71.89 ICD-9-CM: V65.49  Unknown Unknown Need for emotional support ICD-10-CM: R45.89 ICD-9-CM: 799.29  Unknown Unknown Shortness of breath ICD-10-CM: R06.02 
ICD-9-CM: 786.05  Unknown Unknown S/P cardiac cath ICD-10-CM: Z98.890 ICD-9-CM: V45.89  4/8/2021 Unknown Overview Addendum 4/13/2021  2:57 PM by Brandy Alba, NP  
  4/13/2021: s/p PCI/ADDIS to mid LAD x 1  
4/8/2021: cardiac cath showed MVD. Mod/severe MR, elevated PA pressures CHF (congestive heart failure) (HCC) ICD-10-CM: I50.9 ICD-9-CM: 428.0  4/7/2021 Unknown NSTEMI (non-ST elevated myocardial infarction) ([facility] [address] ICD-10-CM: I21.4 ICD-9-CM: 410.70  4/7/2021 Unknown Chest pain ICD-10-CM: R07.9 ICD-9-CM: 786.50  11/23/2020 Unknown Review of Systems:  
Review of systems not obtained due to patient factors. Vital Signs:  
 Last 24hrs VS reviewed since prior progress note. Most recent are: 
Visit Vitals BP (!) 106/49 Pulse 90 Temp 97.4 °F (36.3 °C) Resp 18 Ht 5' 10\" (1.778 m) Wt 124.7 kg (274 lb 14.6 oz) SpO2 100% BMI 39.45 kg/m² Intake/Output Summary (Last 24 hours) at 4/15/2021 1132 Last data filed at 4/15/2021 1100 Gross per 24 hour Intake 7551.98 ml Output 1460 ml Net 6091.98 ml Physical Examination:  
 
 
     
Constitutional:  sedated ENT:  Oral mucous moist, oropharynx benign. Resp:  intubated crackles bilaterally. No wheezing/rhonchi/rales. No accessory muscle use CV:  Regular rhythm, normal rate, no murmurs, gallops, rubs GI:  Soft, non distended, non tender. normoactive bowel sounds, no hepatosplenomegaly Musculoskeletal:  massive R thigh/inguinal hematoma, warm, 2+ pulses throughout Neurologic:  sedated Psych:  sedated Skin:  Good turgor, no rashes or ulcers Hematologic/Lymphatic/Immunlogic:  No jaundice nor lymph node swelling :  deferred Eyes:  EOMI. Anicteric sclerae, PERRL.  
  
 
Data Review:  
 Review and/or order of clinical lab test 
 
 
Labs:  
 
Recent Labs 04/15/21 
1103 04/15/21 
0503 04/14/21 
0357 04/14/21 
5650 WBC  --  14.9*  --  16.5* HGB 7.9* 8.3*   < > 8.8* HCT 22.6* 24.1*   < > 26.3*  
PLT  --  125*  --  156  
 < > = values in this interval not displayed. Recent Labs 04/15/21 
0503 04/14/21 
6675 04/14/21 
9648  134* 135* K 3.4* 4.0 3.9  106 105 CO2 21 19* 20* BUN 36* 40* 41* CREA 2.29* 2.43* 2.42* * 254* 260* CA 8.0* 7.7* 8.0*  
MG 1.7 1.8 1.8 PHOS 4.6 5.7* 5.3* Recent Labs 04/15/21 
0503 04/14/21 
0138 04/13/21 
7248 ALT 14 16 11* AP 41* 46 69 TBILI 1.5* 1.7* 1.2* TP 4.7* 4.4* 5.5* ALB 2.4* 2.0* 2.3*  
GLOB 2.3 2.4 3.2 Recent Labs 04/15/21 
0503 04/14/21 
2256 04/14/21 
1713 04/14/21 
6887 04/13/21 
0447 04/13/21 
6338 INR 1.2* 1.3* 1.4* 1.6*   < > 1.3* PTP 12.6* 13.0* 14.5* 16.6*   < > 13.0* APTT  --   --   --  40.3*  --  94.3*  
 < > = values in this interval not displayed. No results for input(s): FE, TIBC, PSAT, FERR in the last 72 hours. Lab Results Component Value Date/Time Folate 8.5 03/14/2020 02:49 PM  
  
No results for input(s): PH, PCO2, PO2 in the last 72 hours. No results for input(s): CPK, CKNDX, TROIQ in the last 72 hours. No lab exists for component: CPKMB Lab Results Component Value Date/Time Cholesterol, total 79 04/11/2021 04:25 AM  
 HDL Cholesterol 18 04/11/2021 04:25 AM  
 LDL, calculated 44.6 04/11/2021 04:25 AM  
 Triglyceride 82 04/11/2021 04:25 AM  
 CHOL/HDL Ratio 4.4 04/11/2021 04:25 AM  
 
Lab Results Component Value Date/Time Glucose (POC) 98 04/15/2021 11:29 AM  
 Glucose (POC) 65 04/15/2021 11:10 AM  
 Glucose (POC) 106 (H) 04/15/2021 05:42 AM  
 Glucose (POC) 226 (H) 04/14/2021 11:21 PM  
 Glucose (POC) 274 (H) 04/14/2021 06:31 PM  
 
Lab Results Component Value Date/Time  Color YELLOW/STRAW 12/19/2020 04:48 PM  
 Appearance CLOUDY (A) 12/19/2020 04:48 PM  
 Specific gravity 1.013 12/19/2020 04:48 PM  
 pH (UA) 5.5 12/19/2020 04:48 PM  
 Protein 100 (A) 12/19/2020 04:48 PM  
 Glucose Negative 12/19/2020 04:48 PM  
 Ketone Negative 12/19/2020 04:48 PM  
 Bilirubin Negative 12/19/2020 04:48 PM  
 Urobilinogen 1.0 12/19/2020 04:48 PM  
 Nitrites Positive (A) 12/19/2020 04:48 PM  
 Leukocyte Esterase TRACE (A) 12/19/2020 04:48 PM  
 Epithelial cells FEW 12/19/2020 04:48 PM  
 Bacteria 4+ (A) 12/19/2020 04:48 PM  
 WBC 5-10 12/19/2020 04:48 PM  
 RBC 0-5 12/19/2020 04:48 PM  
 
 
 
Medications Reviewed:  
 
Current Facility-Administered Medications Medication Dose Route Frequency  0.9% sodium chloride infusion 250 mL  250 mL IntraVENous PRN  potassium chloride 20 mEq in 50 ml IVPB  20 mEq IntraVENous Q1H  
 bumetanide (BUMEX) injection 2 mg  2 mg IntraVENous BID  insulin lispro (HUMALOG) injection   SubCUTAneous Q6H  
 dextrose (D50W) injection syrg 12.5-25 g  12.5-25 g IntraVENous PRN  
 fentaNYL (PF) 1,500 mcg/30 mL (50 mcg/mL) infusion  0-200 mcg/hr IntraVENous TITRATE  propofol (DIPRIVAN) 10 mg/mL infusion  0-50 mcg/kg/min IntraVENous TITRATE  chlorhexidine (ORAL CARE KIT) 0.12 % mouthwash 15 mL  15 mL Oral Q12H  clopidogreL (PLAVIX) tablet 75 mg  75 mg Oral DAILY  fentaNYL citrate (PF) injection 50 mcg  50 mcg IntraVENous Q2H PRN  
 fentaNYL citrate (PF) injection 25 mcg  25 mcg IntraVENous Q1H PRN  
 aspirin chewable tablet 81 mg  81 mg Per G Tube DAILY  dextrose (D50W) injection syrg 12.5-25 g  12.5-25 g IntraVENous PRN  
 EPINEPHrine (ADRENALIN) 10 mg in 0.9% sodium chloride 250 mL infusion  0-20 mcg/min IntraVENous TITRATE  
 NOREPINephrine (LEVOPHED) 32 mg in 5% dextrose 250 mL infusion  0.5-100 mcg/min IntraVENous TITRATE  famotidine (PF) (PEPCID) 20 mg in 0.9% sodium chloride 10 mL injection  20 mg IntraVENous Q24H  
 0.9% sodium chloride infusion 250 mL  250 mL IntraVENous PRN  
 sodium chloride (NS) flush 5-40 mL  5-40 mL IntraVENous Q8H  
 sodium chloride (NS) flush 5-40 mL  5-40 mL IntraVENous PRN  
 naloxone (NARCAN) injection 0.4 mg  0.4 mg IntraVENous PRN  
 vasopressin (VASOSTRICT) 20 Units in 0.9% sodium chloride 100 mL infusion  0-0.03 Units/min IntraVENous TITRATE  heparin (porcine) 2,000 Units in 0.9% sodium chloride 500 mL Irrigation    PRN  
 balsam peru-castor oiL (VENELEX) ointment   Topical BID  atorvastatin (LIPITOR) tablet 10 mg  10 mg Oral DAILY  [Held by provider] apixaban (ELIQUIS) tablet 5 mg  5 mg Oral Q12H  
 magic mouthwash cpd (without sucralfate)  5 mL Oral TID PRN  
 DOBUTamine (DOBUTREX) 500 mg/250 mL (2,000 mcg/mL) infusion  0-10 mcg/kg/min IntraVENous TITRATE  [Held by provider] carvediloL (COREG) tablet 3.125 mg  3.125 mg Oral BID WITH MEALS  
 alcohol 62% (NOZIN) nasal  1 Ampule  1 Ampule Topical Q12H  
 nitroglycerin (NITROSTAT) tablet 0.4 mg  0.4 mg SubLINGual PRN  
 sodium chloride (NS) flush 5-40 mL  5-40 mL IntraVENous Q8H  
 sodium chloride (NS) flush 5-40 mL  5-40 mL IntraVENous PRN  
 acetaminophen (TYLENOL) tablet 650 mg  650 mg Oral Q6H PRN Or  
 acetaminophen (TYLENOL) suppository 650 mg  650 mg Rectal Q6H PRN  polyethylene glycol (MIRALAX) packet 17 g  17 g Oral DAILY PRN  promethazine (PHENERGAN) tablet 12.5 mg  12.5 mg Oral Q6H PRN Or  
 ondansetron (ZOFRAN) injection 4 mg  4 mg IntraVENous Q6H PRN  
 glucose chewable tablet 16 g  4 Tab Oral PRN  
 dextrose (D50W) injection syrg 12.5-25 g  12.5-25 g IntraVENous PRN  
 glucagon (GLUCAGEN) injection 1 mg  1 mg IntraMUSCular PRN  
 
______________________________________________________________________ EXPECTED LENGTH OF STAY: 3d 22h ACTUAL LENGTH OF STAY:          8 
 
            
Clayton Pierce MD

## 2021-04-15 NOTE — PROGRESS NOTES
[address]  [phone] Cardiology Progress Note 4/15/2021 1020 AM  
 
Admit Date: 4/7/2021 Admit Diagnosis:  
Chest pain [R07.9] NSTEMI (non-ST elevated myocardial infarction) ([facility] [address] [I21.4] CHF (congestive heart failure) ([facility] [address] [I50.9] Subjective:  
 
Mick Presumpatrick is intubated and sedated this am. Discussed case at length with RN again this am.  
 
S/p PCI/ADDIS to mid LAD 4/13/2021. S/p right groin exploration/control of bleeding/evacuation of hematoma/placement of drains on 4/13/2021. Continues to bleed into groin. The patient is on epi at 15 mcg, dobutamine at 10 mcg, and noreip at 50 mcg. Also on a touch of propofol at 35 mcg and fentanyl for chronic pain management. PA pressures 36/25, BP 94/45, , CI/CO 2.5/5.9, SV02 64% at time of rounding. She has two YOANA drains to right groin; no longer draining likely clotted Hgb 8.3 this am, with repeat pending at 11 am 
 
Hold eliquis, continue ASA and switch brilinta to plavix. Hold coreg. Appreciate vascular surgery involvement Visit Vitals BP (!) 101/46 Pulse 90 Temp 97.2 °F (36.2 °C) Resp 17 Ht 5' 10\" (1.778 m) Wt 124.7 kg (274 lb 14.6 oz) SpO2 100% BMI 39.45 kg/m² Current Facility-Administered Medications Medication Dose Route Frequency  0.9% sodium chloride infusion 250 mL  250 mL IntraVENous PRN  potassium chloride 20 mEq in 50 ml IVPB  20 mEq IntraVENous Q1H  
 bumetanide (BUMEX) injection 2 mg  2 mg IntraVENous BID  insulin lispro (HUMALOG) injection   SubCUTAneous Q6H  
 dextrose (D50W) injection syrg 12.5-25 g  12.5-25 g IntraVENous PRN  
 insulin glargine (LANTUS) injection 12 Units  12 Units SubCUTAneous Q12H  
 fentaNYL (PF) 1,500 mcg/30 mL (50 mcg/mL) infusion  0-200 mcg/hr IntraVENous TITRATE  propofol (DIPRIVAN) 10 mg/mL infusion  0-50 mcg/kg/min IntraVENous TITRATE  chlorhexidine (ORAL CARE KIT) 0.12 % mouthwash 15 mL  15 mL Oral Q12H  clopidogreL (PLAVIX) tablet 75 mg  75 mg Oral DAILY  fentaNYL citrate (PF) injection 50 mcg  50 mcg IntraVENous Q2H PRN  
 fentaNYL citrate (PF) injection 25 mcg  25 mcg IntraVENous Q1H PRN  
 aspirin chewable tablet 81 mg  81 mg Per G Tube DAILY  dextrose (D50W) injection syrg 12.5-25 g  12.5-25 g IntraVENous PRN  
 EPINEPHrine (ADRENALIN) 10 mg in 0.9% sodium chloride 250 mL infusion  0-20 mcg/min IntraVENous TITRATE  
 NOREPINephrine (LEVOPHED) 32 mg in 5% dextrose 250 mL infusion  0.5-100 mcg/min IntraVENous TITRATE  famotidine (PF) (PEPCID) 20 mg in 0.9% sodium chloride 10 mL injection  20 mg IntraVENous Q24H  
 0.9% sodium chloride infusion 250 mL  250 mL IntraVENous PRN  
 sodium chloride (NS) flush 5-40 mL  5-40 mL IntraVENous Q8H  
 sodium chloride (NS) flush 5-40 mL  5-40 mL IntraVENous PRN  
 naloxone (NARCAN) injection 0.4 mg  0.4 mg IntraVENous PRN  
 vasopressin (VASOSTRICT) 20 Units in 0.9% sodium chloride 100 mL infusion  0-0.03 Units/min IntraVENous TITRATE  heparin (porcine) 2,000 Units in 0.9% sodium chloride 500 mL Irrigation    PRN  
 balsam peru-castor oiL (VENELEX) ointment   Topical BID  atorvastatin (LIPITOR) tablet 10 mg  10 mg Oral DAILY  [Held by provider] apixaban (ELIQUIS) tablet 5 mg  5 mg Oral Q12H  
 magic mouthwash cpd (without sucralfate)  5 mL Oral TID PRN  
 DOBUTamine (DOBUTREX) 500 mg/250 mL (2,000 mcg/mL) infusion  0-10 mcg/kg/min IntraVENous TITRATE  [Held by provider] carvediloL (COREG) tablet 3.125 mg  3.125 mg Oral BID WITH MEALS  
 alcohol 62% (NOZIN) nasal  1 Ampule  1 Ampule Topical Q12H  
 nitroglycerin (NITROSTAT) tablet 0.4 mg  0.4 mg SubLINGual PRN  
 sodium chloride (NS) flush 5-40 mL  5-40 mL IntraVENous Q8H  
 sodium chloride (NS) flush 5-40 mL  5-40 mL IntraVENous PRN  
 acetaminophen (TYLENOL) tablet 650 mg  650 mg Oral Q6H PRN  Or  
 acetaminophen (TYLENOL) suppository 650 mg  650 mg Rectal Q6H PRN  
 polyethylene glycol (MIRALAX) packet 17 g  17 g Oral DAILY PRN  promethazine (PHENERGAN) tablet 12.5 mg  12.5 mg Oral Q6H PRN Or  
 ondansetron (ZOFRAN) injection 4 mg  4 mg IntraVENous Q6H PRN  
 glucose chewable tablet 16 g  4 Tab Oral PRN  
 dextrose (D50W) injection syrg 12.5-25 g  12.5-25 g IntraVENous PRN  
 glucagon (GLUCAGEN) injection 1 mg  1 mg IntraMUSCular PRN Objective:  
  
Physical Exam: 
Gen: chronically ill appearing  female, ashen/yellow appearance intubated and sedated Abdomen: soft, non-tender. Bowel sounds hypoactive, obese Extremities: left leg BKA, right leg trace edema, discolored, large soft hematoma to groin. Two YOANA drains in place not draining. Dorsalis pulse palpable. Heart: regular rate and rhythm, no murmur, S3/JVD Lungs: diminished throughout Neck: supple, symmetrical, trachea midline Neurologic: sedated and intubated Data Review:  
Recent Labs 04/15/21 
1103 04/15/21 
0503 04/14/21 
2256 04/14/21 
4889 04/14/21 
0357 04/14/21 
2159 WBC  --  14.9*  --   --  16.5* 12.5* HGB 7.9* 8.3* 7.1*   < > 8.8* 10.8* HCT 22.6* 24.1* 20.9*   < > 26.3* 32.4* PLT  --  125*  --   --  156 169  
 < > = values in this interval not displayed. Recent Labs 04/15/21 
0503 04/14/21 
2256 04/14/21 
1713 04/14/21 
7584 04/14/21 
6424 04/14/21 
0138 04/13/21 
7288   --   --   --  134* 135* 134* K 3.4*  --   --   --  4.0 3.9 3.8   --   --   --  106 105 101 CO2 21  --   --   --  19* 20* 25 *  --   --   --  254* 260* 172* BUN 36*  --   --   --  40* 41* 45* CREA 2.29*  --   --   --  2.43* 2.42* 2.76* CA 8.0*  --   --   --  7.7* 8.0* 8.7 MG 1.7  --   --   --  1.8 1.8 2.0 PHOS 4.6  --   --   --  5.7* 5.3*  --   
ALB 2.4*  --   --   --   --  2.0* 2.3* TBILI 1.5*  --   --   --   --  1.7* 1.2* ALT 14  --   --   --   --  16 11* INR 1.2* 1.3* 1.4*   < > 1.7*  --  1.3*  
 < > = values in this interval not displayed. No results for input(s): TROIQ, CPK, CKMB in the last 72 hours. Intake/Output Summary (Last 24 hours) at 4/15/2021 1113 Last data filed at 4/15/2021 1100 Gross per 24 hour Intake 8084.78 ml Output 1425 ml Net 6659.78 ml Cardiographics 
  Telemetry: Paced ECG: Paced, no acute changes Echocardiogram: 3/1/2021 · LV: Estimated LVEF is 50 - 55%. Visually measured ejection fraction. Normal cavity size. Upper normal wall thickness. Low normal systolic function. · MV: Mitral annular calcification. Mild to moderate mitral valve regurgitation is present. · TV: Mild to moderate tricuspid valve regurgitation is present. · PA: Pulmonary arterial systolic pressure is 44 mmHg. · Image quality for this study was technically difficult. New ECHO 4/7/2021:  
· LV: Estimated LVEF is 20 - 25%. Normal cavity size. Moderate concentric hypertrophy. Severely reduced systolic function. · RV: Dilated right ventricle. Mildly reduced systolic function. Pacing wire present · MV: Mitral valve non-specific thickening. Mild mitral annular calcification. Moderate to severe mitral valve regurgitation is present. · TV: Mild tricuspid valve regurgitation is present. · PA: Moderate pulmonary hypertension. Pulmonary arterial systolic pressure is 47 mmHg. · RA: Mildly dilated right atrium. Repeat echo 4/11/2021 on dobutamine: 
· LV: Estimated LVEF is 25 - 30%. Normal cavity size. Moderate concentric hypertrophy. Moderate-to-severely reduced systolic function. Pacemaker. . 
· LA: Moderately dilated left atrium. · RV: Dilated right ventricle. Borderline low systolic function. Pacing wire present · RA: Moderately dilated right atrium. · AV: Mild aortic valve focal thickening present. · MV: Mitral valve non-specific thickening. Mild mitral annular calcification. Mild to moderate mitral valve regurgitation is present. · TV: Mild tricuspid valve regurgitation is present. · PA: Moderate pulmonary hypertension. Pulmonary arterial systolic pressure is 52 mmHg. CXRAY: \"no acute findings\" Assessment:  
 
Active Problems: 
  Chest pain (11/23/2020) CHF (congestive heart failure) ([facility] [address] (4/7/2021) NSTEMI (non-ST elevated myocardial infarction) ([facility] [address] (4/7/2021) S/P cardiac cath (4/8/2021) Overview: 4/13/2021: s/p PCI/ADDIS to mid LAD x 1  
    4/8/2021: cardiac cath showed MVD. Mod/severe MR, elevated PA pressures DNR (do not resuscitate) discussion () Goals of care, counseling/discussion () Need for emotional support () Shortness of breath () Plan:  
Ester England is a 63 y.o. female with a PMH significant for chronic PAF, right arm lymphadema (s/p lymphectomy), bilateral mastectomy for breast CA, HTN. HLD, neuropathy, left BKA, SSS, DM II, CKD, Asthma, A-flutter, long term use of OAC, endocarditis  admitted for Chest pain [R07.9] NSTEMI (non-ST elevated myocardial infarction) ([facility] [address] [I21.4] CHF (congestive heart failure) ([facility] [address]    
 
 
 
NSTEMI: s/p cardiac cath which showed MVD, mod/severe MR, and PHTN, cardiogenic shock due to ischemic cardiomyopathy: 
Troponin peaked at 55, trended down to 28, no further trending necessary. Cardiac index improved from 1.1- Mixed venous oxygen saturation is 80% and cardiac index 2.2 on rounds today. PA pressures in the 40s over low 20s. ECHO showed new onset ICM; EF 20-25%. Repeat on dobutamine 4/11/2021 showed EF of 25-30, mitral regurgitation reduced to mild to moderate. Patient declined for cardiac surgery due to prohibitively high riskinitiate Brilinta in preparation for possible high risk PCI when optimized She does understand a significantly higher than average risk of contrast-induced nephropathy that could progress to end-stage renal disease requiring dialysis, and a higher than average risk of myocardial infarction and death with her severe ischemic cardiomyopathy with cardiogenic shock currently on pressors.  
I discussed the risks and benefits of cardiac catheterization and PCI plus or minus Impella catheter with the patient who expressed understanding and wishes to proceed. Patient underwent successful PCI/ADDIS to mid LAD 4/13/21 without need for impella support. Was pre-medicated for IV contrast allergy. Re-admitted to ICU after procedure. Developed hematoma overnight after sheath pull, taken to OR by vascular surgery. Hemodynamically unstable, started on multiple pressors as listed above. Will wean as tolerated. Continue plavix, closely monitor YOANA drain outpt (clotted). Hold Eliquis Monitor H/H, hemoynamics, YOANA drain outpt. Record every hour. Mild/mod. mitral regurgitation: 
ECHO on 4/11/2021 showed mild/mod MR 
  
Acute on chronic systolic HF, with cardiogenic and hypovolemic shock:  
Continue pressor support to help maintain hemodynamic stability Monitor and document hemodynamics every hour Please titrate to MAP equal to 65. CI 2-2.2 is ok.  
+ fluid status now with all the colloid and fluids for her shock state, creatinine stable, down to 2.29 CXray ok this am shows new pulmonary haziness, Nephrology restarting diuretics Parox A-fib s/p AV node ablation   
Coreg held with cardiogenic shock on pressors Hold eliquis Hypertension: controlled, actually a little low normal after sedation Monitor closely. Hold bumex, hold coreg Continue pressor support 
  
PARAG on Chronic kidney disease stage IV : baseline 2-2.1 Renal function improved with dobutamine and improved cardiac output. Avoid nephrotoxic substances Follow BMP-Cr 2.29 Nephrology following, diuresing for congestion 
  
DM II: 
Manage per internal medicine  
  
HLD:  
LDL 44 on 4/11/2021 Starting low-dose statin due to three-vessel disease Noted his history of questionable nausea only with pravastatin in 2010, and that statin may have been held due to elevated LFTs, but LFTs are currently normal 
  
Anxiety:   
Continue zoloft -on hold  
  Chronic pain: 
Management as per pulmonary critical care and internal medicine On fentanyl now Acute blood loss anemia:  
Has received 10 units PRBC's thus far FFP x 4, and platelets x 1 Monitor H/H, transfuse as needed to keep hgb greater than 7 Monitor and document YOANA output q 1 hour, notify provider if increases Please see Dr. Martino Poag addendum. Regina MONROE,BM 
DNP,APRN,AG-ACNP-BC Patient seen and examined by me with the above nurse practitioner. I personally performed all components of the history, physical, and medical decision making and agree with the assessment and plan with minor modifications as noted. Today the patient has had a decrease in the rate of blood transfusions. Hopefully bleeding is slowing. General PE Gen: Sedated and intubated, appears comfortable HEENT: 
PERRL, no carotid bruits or JVD Chest and Lung Exam  
Inspection: Accessory muscles - No use of accessory muscles in breathing. Auscultation:  
Breath sounds: - Normal.  
Cardiovascular Inspection: Jugular vein - Bilateral - Inspection Normal.  
Palpation/Percussion:  
Apical Impulse: - Normal.  
Auscultation: Rhythm - Regular. Heart Sounds - S1 WNL and S2 WNL. No S3 or S4. Murmurs & Other Heart Sounds: Auscultation of the heart reveals - No Murmurs. Peripheral Vascular Upper Extremity: Inspection - Bilateral - No Cyanotic nailbeds or Digital clubbing. Lower Extremity:  
Palpation: Edema - Bilateral -large right femoral hematoma with dressing clean dry and intact, 2 YOANA dressings in place Abdomen:   Soft, non-tender, bowel sounds are active. Neuro: Sedated and intubated, appears comfortable Bleeding seems to be slowing. Fortunately hemoglobin and creatinine are stable with transfusions and pressors. If bleeding can stop, hoping for recovery and improvement in LV systolic function after PCI to LAD. Greater than 30 minutes of critical care time spent at the bedside exclusive of procedures.

## 2021-04-15 NOTE — PROGRESS NOTES
1900: Bedside and Verbal shift change report given to Elaine Rivera RN (offgoing nurse) Report included the following information SBAR, Kardex, Intake/Output, MAR, Accordion, Recent Results and Cardiac Rhythm- V-paced 
 
2000: Shift assessment completed. 2157: 1 unit of plasma completed. VS stable. No reaction noted. 0000: Reassessment complete, no changes to previous. 0200: Chlorhexidine bathe completed 0400: Reassessment complete. 0415: BP Dropped - Epic at 15 mcg/min - Levo at  50 mcg.min 
 - Dobut at 10 mcg/kg/min  
 - fent at 75 mcg/hr 
 - Propofol at 35 mcg/kg/min  
0421: 1 unit of PRBC given - no reaction noted NP [place] aware and LR one liter of bolus given. 0700: Bedside and Verbal shift change report given to Elaine Rivera RN (oncoming nurse) by Yuliet Elizalde RN (offgoing nurse). Report included the following information SBAR, Kardex, Intake/Output, MAR, Accordion, Recent Results and Cardiac Rhythm V- paced.

## 2021-04-15 NOTE — PROGRESS NOTES
Still drip dependent and critically ill Hgb = 8.3 with another unit overnight INR now normal 
Drains now with little outpt (clotted) Groin/thigh exam unchanged Cont to support, transfuse, and wean Repeat labs at 11am

## 2021-04-15 NOTE — PROGRESS NOTES
0700: Bedside shift change report received from Eliecer, [place] (offgoing nurse). Report included the following information SBAR, Kardex, ED Summary, Procedure Summary, Intake/Output, MAR, Recent Results and Cardiac Rhythm PACED w/ PVCs. 0745: Dr. Nidhi Bhat updated on events over night at bedside, new orders received for labs at 11 am -- orders placed by MD 
 
0800: Shift assessment completed, see flowsheets 0390: Dr. Becky Aguilera at bedside, updated on pts status. New orders received, see MAR -- Per MD maintain pts MAP >65 to improve UOP. 
 
1127: Dr. Nidhi Bhat paged in regards to pts hgb, awaiting return page 1158: Return page received from vascular surgery, no new orders from Dr. Nidhi Bhat. Will continue to monitor 1200: Reassessment completed, see flowsheets -- Consents for arterial line reviewed with patients , patients  has no questions at this time. Signed consent placed on patients chart. Dr. Brodie Macias at bedside for arterial line placement, time out completed. Arterial line successfully placed in pts L Radial artery, arm board in place and line zeroed -- appropriate wave form noted. 1600 :Reassessment completed, see flowsheets 1737: Pts  POC glucose drawn from pts art line. Current BG 25, repeated B, MD notified --  PRN D50 administered, will recheck BG in 15 minutes. New orders received, see MAR 
 
165: Pts had spontaneous rapid episode of hypotension,  SBP ~ 77, ABP ~ 72/31 -- Levo titrated, see MAR. Art line rezeroed. 182: Dr. Brodie Macias notified pts hgb: 7.1, verbal orders received to transfuse 1 unit PRBCs, orders placed. 190: Bedside shift change report given to GURINDER Gonzáles (oncoming nurse) by GURINDER Reaves (offgoing nurse). Report included the following information SBAR, Kardex, ED Summary, Procedure Summary, Intake/Output, MAR and Recent Results.

## 2021-04-15 NOTE — PROGRESS NOTES
Nephrology Progress Note [facility] Kiser  
 
[email or web address]. [email or web address]  Phone - [phone] Patient: Toni Perez    YOB: 1960 Date- 4/15/2021   Admit Date: 4/7/2021 CC: Follow up for PARAG on CKD IMPRESSION & PLAN:  
· PARAG  likely due to ATN,CRS from suppressed EF from recent MI and NOE. · Hypokalemia · Large tense painful hematoma R groin/thigh after R CFA access for cardiac cath · Shock- cardiogenic + hemorrhagic · Edema- volume overload · Iron defi anemia · Hyponatremia due to chf 
· NSTEMI: s/p cardiac cath which showed MVD, mod/severe MR, and PHTN, cardiogenic shock due to ischemic cardiomyopathy- s/p PCI/ADDIS to mid LAD  on 4-13-21 
· afib - s/p AV node ablation · Anemia - CHRONIC- f/b DR. RUIZ 
· hypertension · H/O LEFT BKA · ckd  3 LIKELY due to DM nephropathy and HTN nephrosclerosis- BL CR. 1.5-1.9 
· H/O Diabetic foot · Proteinuria likely due to DM nephropathy and HTN nephroscl- urine pr/cr 1.5 g/g · Vit d defi PLAN- 
· GIVE BUMEX NOW · kcl 20 meq iv · Goal to keep input = output · Continue vasopressor support · Follow BMP · Avoid hypotension Subjective: Interval History:  
-remained on vent Hb low requiring PRBC tx 
INPUT >>>>OUT PUT 
cxr showed pulmonary edema 4/14/21 She developed hematoma after cardiac cath 4/13/21 She received 7 units PRBC's ,FP x 2, and platelets x 1 She is on the vent Blood pressure low requiring vasopressor support Objective:  
Vitals:  
 04/15/21 0845 04/15/21 0900 04/15/21 0915 04/15/21 5515 BP: (!) 95/45 (!) 93/45 (!) 94/45 Pulse: 91 91 91 91 Resp: 12 13 8 18 Temp: (!) 96.4 °F (35.8 °C) (!) 96.5 °F (35.8 °C) (!) 96.6 °F (35.9 °C) SpO2:   100% 100% Weight:      
Height:      
  
04/14 0701 - 04/15 0700 In: 8084.8 [I.V.:5406.5] Out: 1925 [Urine:800; Drains:1125] Last 3 Recorded Weights in this Encounter 04/11/21 1328 04/12/21 1400 04/14/21 2335 Weight: 112.5 kg (248 lb 0.3 oz) 104.2 kg (229 lb 11.5 oz) 124.7 kg (274 lb 14.6 oz) Physical exam:  
GEN: intubated on vent NECK-no mass, ET TUBE 
RESP: coarse b/l, no wheezing CVS: RRR,S1,S2 ABDO: soft , non tender, EXT: ++ Edema ,bka stump + NEURO:Can't access due to patient's current condition  : [place] + Chart reviewed. Pertinent Notes reviewed. Data Review : 
Recent Labs 04/15/21 
0503 04/14/21 
1306 04/14/21 
5776  134* 135* K 3.4* 4.0 3.9  106 105 CO2 21 19* 20* BUN 36* 40* 41* CREA 2.29* 2.43* 2.42* * 254* 260* CA 8.0* 7.7* 8.0*  
MG 1.7 1.8 1.8 PHOS 4.6 5.7* 5.3* Recent Labs 04/15/21 
0503 04/14/21 
2256 04/14/21 
1713 04/14/21 
8738 04/14/21 
1102 04/14/21 
3445 WBC 14.9*  --   --   --  16.5* 12.5* HGB 8.3* 7.1* 8.3*   < > 8.8* 10.8* HCT 24.1* 20.9* 24.3*   < > 26.3* 32.4*  
*  --   --   --  156 169  
 < > = values in this interval not displayed. No results for input(s): FE, TIBC, PSAT, FERR in the last 72 hours. Medication list  reviewed Current Facility-Administered Medications Medication Dose Route Frequency  0.9% sodium chloride infusion 250 mL  250 mL IntraVENous PRN  potassium chloride 20 mEq in 50 ml IVPB  20 mEq IntraVENous Q1H  
 magnesium sulfate 2 g/50 ml IVPB (premix or compounded)  2 g IntraVENous Q1H  
 albumin human 5% (BUMINATE) solution 25 g  25 g IntraVENous ONCE  
 bumetanide (BUMEX) injection 2 mg  2 mg IntraVENous BID  fentaNYL (PF) 1,500 mcg/30 mL (50 mcg/mL) infusion  0-200 mcg/hr IntraVENous TITRATE  propofol (DIPRIVAN) 10 mg/mL infusion  0-50 mcg/kg/min IntraVENous TITRATE  chlorhexidine (ORAL CARE KIT) 0.12 % mouthwash 15 mL  15 mL Oral Q12H  clopidogreL (PLAVIX) tablet 75 mg  75 mg Oral DAILY  fentaNYL citrate (PF) injection 50 mcg  50 mcg IntraVENous Q2H PRN  
 fentaNYL citrate (PF) injection 25 mcg  25 mcg IntraVENous Q1H PRN  
 aspirin chewable tablet 81 mg  81 mg Per G Tube DAILY  insulin glargine (LANTUS) injection 15 Units  15 Units SubCUTAneous Q12H  
 insulin lispro (HUMALOG) injection   SubCUTAneous Q6H  
 dextrose (D50W) injection syrg 12.5-25 g  12.5-25 g IntraVENous PRN  
 EPINEPHrine (ADRENALIN) 10 mg in 0.9% sodium chloride 250 mL infusion  0-20 mcg/min IntraVENous TITRATE  
 NOREPINephrine (LEVOPHED) 32 mg in 5% dextrose 250 mL infusion  0.5-100 mcg/min IntraVENous TITRATE  famotidine (PF) (PEPCID) 20 mg in 0.9% sodium chloride 10 mL injection  20 mg IntraVENous Q24H  
 0.9% sodium chloride infusion 250 mL  250 mL IntraVENous PRN  
 sodium chloride (NS) flush 5-40 mL  5-40 mL IntraVENous Q8H  
 sodium chloride (NS) flush 5-40 mL  5-40 mL IntraVENous PRN  
 naloxone (NARCAN) injection 0.4 mg  0.4 mg IntraVENous PRN  
 vasopressin (VASOSTRICT) 20 Units in 0.9% sodium chloride 100 mL infusion  0-0.03 Units/min IntraVENous TITRATE  heparin (porcine) 2,000 Units in 0.9% sodium chloride 500 mL Irrigation    PRN  
 balsam peru-castor oiL (VENELEX) ointment   Topical BID  atorvastatin (LIPITOR) tablet 10 mg  10 mg Oral DAILY  [Held by provider] apixaban (ELIQUIS) tablet 5 mg  5 mg Oral Q12H  
 magic mouthwash cpd (without sucralfate)  5 mL Oral TID PRN  
 DOBUTamine (DOBUTREX) 500 mg/250 mL (2,000 mcg/mL) infusion  0-10 mcg/kg/min IntraVENous TITRATE  [Held by provider] carvediloL (COREG) tablet 3.125 mg  3.125 mg Oral BID WITH MEALS  
 alcohol 62% (NOZIN) nasal  1 Ampule  1 Ampule Topical Q12H  
 nitroglycerin (NITROSTAT) tablet 0.4 mg  0.4 mg SubLINGual PRN  
 sodium chloride (NS) flush 5-40 mL  5-40 mL IntraVENous Q8H  
 sodium chloride (NS) flush 5-40 mL  5-40 mL IntraVENous PRN  
 acetaminophen (TYLENOL) tablet 650 mg  650 mg Oral Q6H PRN Or  
 acetaminophen (TYLENOL) suppository 650 mg  650 mg Rectal Q6H PRN  polyethylene glycol (MIRALAX) packet 17 g  17 g Oral DAILY PRN  promethazine (PHENERGAN) tablet 12.5 mg  12.5 mg Oral Q6H PRN Or  
 ondansetron (ZOFRAN) injection 4 mg  4 mg IntraVENous Q6H PRN  
 glucose chewable tablet 16 g  4 Tab Oral PRN  
 dextrose (D50W) injection syrg 12.5-25 g  12.5-25 g IntraVENous PRN  
 glucagon (GLUCAGEN) injection 1 mg  1 mg IntraMUSCular PRN Ayden [place], [address] [facility] / [employer] [place] [address], [address], [address] [place] Phone - [phone]               Fax - [phone]

## 2021-04-15 NOTE — PROCEDURES
Procedure:  Ultrasound Guided Placement of a left Radial Arterial Catheter Description of the Procedure: The patient's  consented to the procedure via phone with 
Nurse witness. The benefits, risks and Alternatives explained. All questions answered. The patient was in the supine position. The left forearm/wrist area was prepped and draped in a sterile fashion. The ultrasound was then placed in a sterile sheath. I was able to clearly visualize the pulsatile left radial artery. Under real time ultrasound guidance, I advanced the right radial line catheter and flow of blood was noticed. First attempt catheter didn't thread. I then repeated the procedure and got great pulse back and advanced the guidewire via it and then advanced the actual arterial catheter over it and removed the 
Guidewire and the needle completely out of the patient's body. The catheter connected to the arterial catheter Tubing system and to the monitor. Confirmation of expected arterial wave form noted. No immediate complications. EBL: 10ml. The patient's vital signs, pulseoximetry and electocardiographic tracing remained Stable throught.

## 2021-04-16 NOTE — PROGRESS NOTES
Seems to have stablized and tamponaded Hgb= 9 Thigh no more tight - maybe little softer Minimal YOANA outpt Following Discussed with family and staff

## 2021-04-16 NOTE — PROGRESS NOTES
[address]  [phone] Cardiology Progress Note 4/16/2021 1020 AM  
 
Admit Date: 4/7/2021 Admit Diagnosis:  
Chest pain [R07.9] NSTEMI (non-ST elevated myocardial infarction) ([facility] [address] [I21.4] CHF (congestive heart failure) ([facility] [address] [I50.9] Subjective:  
 
Janusz Lockett is intubated and sedated this am. Discussed case at length with RN again this am.  
 
S/p PCI/ADDIS to mid LAD 4/13/2021. S/p right groin exploration/control of bleeding/evacuation of hematoma/placement of drains on 4/13/2021. Continues to bleed into groin. The patient is on epi, dobutamine, and noreipi. Also on propofol and fentanyl for chronic pain management. PA pressures 37/94,  systolic, EX5.7, WC77 61% at time of rounding. Hold eliquis, continue ASA and plavix. Hold coreg. Appreciate vascular surgery involvement Visit Vitals BP (!) 103/51 Pulse 91 Temp 97.7 °F (36.5 °C) Resp 18 Ht 5' 10\" (1.778 m) Wt 275 lb 9.2 oz (125 kg) SpO2 100% BMI 39.54 kg/m² Current Facility-Administered Medications Medication Dose Route Frequency  0.9% sodium chloride infusion 250 mL  250 mL IntraVENous PRN  
 insulin lispro (HUMALOG) injection   SubCUTAneous Q6H  
 dextrose (D50W) injection syrg 12.5-25 g  12.5-25 g IntraVENous PRN  
 dextrose 5% infusion  75 mL/hr IntraVENous CONTINUOUS  
 fentaNYL (PF) 1,500 mcg/30 mL (50 mcg/mL) infusion  0-200 mcg/hr IntraVENous TITRATE  propofol (DIPRIVAN) 10 mg/mL infusion  0-50 mcg/kg/min IntraVENous TITRATE  chlorhexidine (ORAL CARE KIT) 0.12 % mouthwash 15 mL  15 mL Oral Q12H  clopidogreL (PLAVIX) tablet 75 mg  75 mg Oral DAILY  fentaNYL citrate (PF) injection 50 mcg  50 mcg IntraVENous Q2H PRN  
 fentaNYL citrate (PF) injection 25 mcg  25 mcg IntraVENous Q1H PRN  
 aspirin chewable tablet 81 mg  81 mg Per G Tube DAILY  dextrose (D50W) injection syrg 12.5-25 g  12.5-25 g IntraVENous PRN  
 EPINEPHrine (ADRENALIN) 10 mg in 0.9% sodium chloride 250 mL infusion  0-20 mcg/min IntraVENous TITRATE  
 NOREPINephrine (LEVOPHED) 32 mg in 5% dextrose 250 mL infusion  0.5-100 mcg/min IntraVENous TITRATE  famotidine (PF) (PEPCID) 20 mg in 0.9% sodium chloride 10 mL injection  20 mg IntraVENous Q24H  
 0.9% sodium chloride infusion 250 mL  250 mL IntraVENous PRN  
 sodium chloride (NS) flush 5-40 mL  5-40 mL IntraVENous Q8H  
 sodium chloride (NS) flush 5-40 mL  5-40 mL IntraVENous PRN  
 naloxone (NARCAN) injection 0.4 mg  0.4 mg IntraVENous PRN  
 vasopressin (VASOSTRICT) 20 Units in 0.9% sodium chloride 100 mL infusion  0-0.03 Units/min IntraVENous TITRATE  heparin (porcine) 2,000 Units in 0.9% sodium chloride 500 mL Irrigation    PRN  
 balsam peru-castor oiL (VENELEX) ointment   Topical BID  atorvastatin (LIPITOR) tablet 10 mg  10 mg Oral DAILY  [Held by provider] apixaban (ELIQUIS) tablet 5 mg  5 mg Oral Q12H  
 magic mouthwash cpd (without sucralfate)  5 mL Oral TID PRN  
 DOBUTamine (DOBUTREX) 500 mg/250 mL (2,000 mcg/mL) infusion  0-10 mcg/kg/min IntraVENous TITRATE  [Held by provider] carvediloL (COREG) tablet 3.125 mg  3.125 mg Oral BID WITH MEALS  
 alcohol 62% (NOZIN) nasal  1 Ampule  1 Ampule Topical Q12H  
 nitroglycerin (NITROSTAT) tablet 0.4 mg  0.4 mg SubLINGual PRN  
 sodium chloride (NS) flush 5-40 mL  5-40 mL IntraVENous Q8H  
 sodium chloride (NS) flush 5-40 mL  5-40 mL IntraVENous PRN  
 acetaminophen (TYLENOL) tablet 650 mg  650 mg Oral Q6H PRN Or  
 acetaminophen (TYLENOL) suppository 650 mg  650 mg Rectal Q6H PRN  polyethylene glycol (MIRALAX) packet 17 g  17 g Oral DAILY PRN  promethazine (PHENERGAN) tablet 12.5 mg  12.5 mg Oral Q6H PRN  Or  
 ondansetron (ZOFRAN) injection 4 mg  4 mg IntraVENous Q6H PRN  
 glucose chewable tablet 16 g  4 Tab Oral PRN  
 dextrose (D50W) injection syrg 12.5-25 g  12.5-25 g IntraVENous PRN  
 glucagon (GLUCAGEN) injection 1 mg  1 mg IntraMUSCular PRN Objective:  
  
Physical Exam: 
Gen: chronically ill appearing  female, ashen/yellow appearance intubated and sedated Abdomen: soft, non-tender. Bowel sounds hypoactive, obese Extremities: left leg BKA, right leg trace edema, discolored, large soft hematoma to groin. Two YOANA drains in place not draining. Dorsalis pulse palpable. Heart: regular rate and rhythm, no murmur, S3/JVD Lungs: diminished throughout Neck: supple, symmetrical, trachea midline Neurologic: sedated and intubated Data Review:  
Recent Labs 04/16/21 
7782 04/15/21 
1757 04/15/21 
1103 04/15/21 
0503 04/14/21 
0357 04/14/21 
9098 WBC 14.6*  --   --  14.9*  --  16.5* HGB 9.0* 7.1* 7.9* 8.3*   < > 8.8* HCT 25.7*  --  22.6* 24.1*   < > 26.3*  
*  --   --  125*  --  156  
 < > = values in this interval not displayed. Recent Labs 04/16/21 
7028 04/15/21 
0503 04/14/21 
2256 04/14/21 
5899 04/14/21 
6901 04/14/21 
6708 * 136  --   --  134* 135* K 4.0 3.4*  --   --  4.0 3.9  106  --   --  106 105 CO2 21 21  --   --  19* 20* GLU 85 108*  --   --  254* 260* BUN 34* 36*  --   --  40* 41* CREA 2.22* 2.29*  --   --  2.43* 2.42* CA 7.9* 8.0*  --   --  7.7* 8.0*  
MG 2.2 1.7  --   --  1.8 1.8 PHOS 4.6 4.6  --   --  5.7* 5.3* ALB 2.5* 2.4*  --   --   --  2.0*  
TBILI 1.6* 1.5*  --   --   --  1.7* ALT 11* 14  --   --   --  16 INR 1.1 1.2* 1.3*   < > 1.7*  --   
 < > = values in this interval not displayed. No results for input(s): TROIQ, CPK, CKMB in the last 72 hours. Intake/Output Summary (Last 24 hours) at 4/16/2021 1026 Last data filed at 4/16/2021 0800 Gross per 24 hour Intake 4790.42 ml Output 2555 ml Net 2235.42 ml  
  
 
Cardiographics 
  Telemetry: Paced ECG: Paced, no acute changes Echocardiogram: 3/1/2021 · LV: Estimated LVEF is 50 - 55%. Visually measured ejection fraction. Normal cavity size. Upper normal wall thickness. Low normal systolic function. · MV: Mitral annular calcification. Mild to moderate mitral valve regurgitation is present. · TV: Mild to moderate tricuspid valve regurgitation is present. · PA: Pulmonary arterial systolic pressure is 44 mmHg. · Image quality for this study was technically difficult. New ECHO 4/7/2021:  
· LV: Estimated LVEF is 20 - 25%. Normal cavity size. Moderate concentric hypertrophy. Severely reduced systolic function. · RV: Dilated right ventricle. Mildly reduced systolic function. Pacing wire present · MV: Mitral valve non-specific thickening. Mild mitral annular calcification. Moderate to severe mitral valve regurgitation is present. · TV: Mild tricuspid valve regurgitation is present. · PA: Moderate pulmonary hypertension. Pulmonary arterial systolic pressure is 47 mmHg. · RA: Mildly dilated right atrium. Repeat echo 4/11/2021 on dobutamine: 
· LV: Estimated LVEF is 25 - 30%. Normal cavity size. Moderate concentric hypertrophy. Moderate-to-severely reduced systolic function. Pacemaker. . 
· LA: Moderately dilated left atrium. · RV: Dilated right ventricle. Borderline low systolic function. Pacing wire present · RA: Moderately dilated right atrium. · AV: Mild aortic valve focal thickening present. · MV: Mitral valve non-specific thickening. Mild mitral annular calcification. Mild to moderate mitral valve regurgitation is present. · TV: Mild tricuspid valve regurgitation is present. · PA: Moderate pulmonary hypertension. Pulmonary arterial systolic pressure is 52 mmHg. CXRAY: \"no acute findings\" Assessment:  
 
Active Problems: 
  Chest pain (11/23/2020) CHF (congestive heart failure) ([address] (4/7/2021) NSTEMI (non-ST elevated myocardial infarction) ([address] (4/7/2021) S/P cardiac cath (4/8/2021) Overview: 4/13/2021: s/p PCI/ADDIS to mid LAD x 1  
    4/8/2021: cardiac cath showed MVD. Mod/severe MR, elevated PA pressures DNR (do not resuscitate) discussion () Goals of care, counseling/discussion () Need for emotional support () Shortness of breath () Hypovolemic shock (HCC) () Cardiogenic shock (HCC) () Plan:  
Virginia Morrow is a 63 y.o. female with a PMH significant for chronic PAF, right arm lymphadema (s/p lymphectomy), bilateral mastectomy for breast CA, HTN. HLD, neuropathy, left BKA, SSS, DM II, CKD, Asthma, A-flutter, long term use of OAC, endocarditis  admitted for Chest pain [R07.9] NSTEMI (non-ST elevated myocardial infarction) ([facility] [address] [I21.4] CHF (congestive heart failure) ([facility] [address]    
 
 
NSTEMI: s/p cardiac cath which showed MVD, mod/severe MR, and PHTN, cardiogenic shock due to ischemic cardiomyopathy: 
Troponin peaked at 55, trended down to 28, no further trending necessary. Cardiac index improved from 1.1- Mixed venous oxygen saturation is 70% and cardiac index 2.1 on rounds today. PA pressures in the 40s over high 20s. ECHO showed new onset ICM; EF 20-25%. Repeat on dobutamine 4/11/2021 showed EF of 25-30, mitral regurgitation reduced to mild to moderate. Patient declined for cardiac surgery due to prohibitively high risk Patient underwent successful PCI/ADDIS to mid LAD 4/13/21 without need for impella support. Re-admitted to ICU after procedure. Developed hematoma overnight after sheath pull, taken to OR by vascular surgery. Hemodynamically unstable, started on multiple pressors as listed above. Will wean as tolerated as wean fent today Continue plavix, closely monitor YOANA drain outpt (clotted). Hold Eliquis Monitor H/H, hemoynamics, YOANA drain outpt. Record every hour. Mild/mod. mitral regurgitation: 
ECHO on 4/11/2021 showed mild/mod MR Repeat echo today 
  
Acute on chronic systolic HF, with cardiogenic and hypovolemic shock:  
Continue pressor support to help maintain hemodynamic stability Monitor and document hemodynamics every hour Please titrate to MAP equal to 65. CI 2-2.2 is ok.  
+ fluid status now with all the colloid and fluids for her shock state, creatinine stable CXray 4/15 shows new pulmonary haziness, Nephrology restarted diuretics Parox A-fib s/p AV node ablation   
Coreg held with cardiogenic shock on pressors Hold eliquis Hypertension: controlled, actually a little low normal after sedation Monitor closely. Hold bumex, hold coreg Continue pressor support 
  
PARAG on Chronic kidney disease stage IV : baseline 2-2.1 Renal function improved with dobutamine and improved cardiac output. Avoid nephrotoxic substances Follow BMP Nephrology following, diuresing for congestion 
  
DM II: 
Manage per internal medicine  
  
HLD:  
LDL 44 on 4/11/2021 Starting low-dose statin due to three-vessel disease Noted his history of questionable nausea only with pravastatin in 2010, and that statin may have been held due to elevated LFTs, but LFTs are currently normal 
  
Anxiety:   
Continue zoloft -on hold  
  
Chronic pain: 
Management as per pulmonary critical care and internal medicine On fentanyl now- try to wean Acute blood loss anemia:  
Has received 11 units PRBC's thus far- only one overnight 
FFP, and platelets Monitor H/H, transfuse as needed to keep hgb greater than 8-9 with systolic HF Monitor and document YOANA output q 1 hour, notify provider if increases Greater than 30 minutes of critical care spent at the bedside exclusive of procedures discussing with the patient's nurse, discussing weaning strategy, discussing with Dr. Ahmet Talavera and Sandra Hernandez of palliative care. I will discuss with family as well today.  
 
Mckenzie Leyva MD

## 2021-04-16 NOTE — PROGRESS NOTES
1900: Bedside and Verbal shift change report given to Loretha Shone, RN (offgoing nurse) Report included the following information SBAR, Kardex, Intake/Output, MAR, Accordion, Recent Results and Cardiac V paced rhythm 
 
2000: Shift assessment completed. 1 unit of PRBC administered. No reaction noted. VS stable 2330: glucose less than 60. NP Moy De Jesus notified. D50 25g IV given. D5 IV rate is increased to 75 ml/hr. Glucose rechecked and 117. Will continue to assess. 0000: Reassessment complete, no changes to previous. 0400: Reassessment complete, no changes. 0600: Chlorhexidine bathe completed. Right groin site is still oozing. All dressing is changed. Glucose was 80. Will continue to assess. 0700: Bedside and Verbal shift change report given to Milton Law RN (oncoming nurse) by Maya Shoemaker RN (offgoing nurse). Report included the following information SBAR, Kardex, Intake/Output, MAR, Accordion, Recent Results and Cardiac Rhythm V-paced.

## 2021-04-16 NOTE — PROGRESS NOTES
0700 Bedside and Verbal shift change report given to Dannie Soto (oncoming nurse) by Donneta Claude (offgoing nurse). Report included the following information SBAR, Kardex, Procedure Summary, Intake/Output, MAR, Recent Results and Cardiac Rhythm NSR.  
0800 pt assessed flowsheet. Infusions verified, pt repositioned with dual skin preformed with [place], bilat stage 1 sacral region- treatment with Venelex 0830 Dr Tawana Chappell in to assess pt with review of plan of care. 46 Dr Ml Chaudhary in to assess pt with review of plan of care 
1000 Dr Christine Burkitt in to assess pt, reviewed plan of care. He has spoken with Joaquin Ortega NP from palliative Care team and Dr Val Awad. 1200 pt reassessed per flowsheet 1500 ECHO at bedside. Continue to wean Levo and sedation as zoey 
1600 pt reassessed per flowsheet 
1700 pt coughing, gagging and biting ETT, Prop increased. Pt is able to follow simple commands and moves all extremities slightly. Restraints have been removed due to no effort noted to pull at any lines 1900 Bedside and Verbal shift change report given to Stella Coffman (oncoming nurse) by Dannie Soto (offgoing nurse). Report included the following information SBAR, Kardex, Procedure Summary, Intake/Output, MAR, Recent Results and Cardiac Rhythm NSR.

## 2021-04-16 NOTE — PROGRESS NOTES
Palliative Medicine Consult Don: [phone] ([phone]) Patient Name: Sandy Rodriguez YOB: 1960 Date of Initial Consult: 4/14/2021 Reason for Consult: Care Decisions Requesting Provider: Dmitriy Harper MD 
Primary Care Physician: Fallon Boggs MD 
 
 SUMMARY:  
Sandy Rodriguez is a 63 y.o. with a past history of a-fib, breast cancer, CKD, DMII, HTN, morbid obesity, osteoarthritis, pacemaker, and asthma, who was transferred on 4/7/2021 from [facility] with a diagnosis of Chest pain and acute on chronic CHF. Planned for cardiac cath +/- PCI She was a high risk PCI, but agreed to the risk as her risk of progressive heart failure and death was likely higher without cardiac cath She had a cath on 4/8 which showed multi vessel disease and moderate to severe MR with pulm htn. She also has new onset ICM with an EF of 20-25% She was transferred to the CCU- was treated for cardiogenic shock and started on inotropic support. Cardiac surgery evaluated her, but patient declined a surgical option due to it being too high risk. [place]-Brunilda catheter was placed on 4/9 She underwent PCI/ADDIS to mid LAD on 4/13- it was successful without need for impella support. That evening, after the sheath was pulled, she had an actively expanding hematoma to the right groin. Vascular surgery took her to the OR for a right groin exploration and hematoma evacuation. Drains were placed to help control bleeding She has had impressive output from the drains still this morning. She is being transfused Current medical issues leading to Palliative Medicine involvement include: goals of care in the setting of critical illness requiring a high risk cardiac procedure that resulted in complications PALLIATIVE DIAGNOSES:  
1. DNR Discussion 2. Goals of care 3. Need for emotional support 4. Shortness of breath 5. Hypovolemic shock 6. Cardiogenic shock PLAN:  
1.  Reviewed the chart and spoke with nursing as well as the attending, and cardiology 2. We reviewed her case in detail and the plan for the day (see their notes for specifics) 3. Ms Marek Pérez is still quite sedated, minimal change in status, but not worse. I do still have concerns for her prognosis long term, and having a conversation about what she considers \"living\" is appropriate, but for now will hold the course 4. Her  may come in this afternoon- he is expecting to get an update on whether she is 1. Worse, 2. The same, or 3. Better. Yesterday he brought up compassionate extubation but I advised (as he was struggling with this though) that we wait and see how she is doing today 5. Will defer to the medical team at the moment, as they have a lot they want to discuss with him, but will follow up Monday pending the course over the weekend 6. Initial consult note routed to primary continuity provider and/or primary health care team members 7. Communicated plan of care with: Palliative [facility] Team 
 
 
 GOALS OF CARE / TREATMENT PREFERENCES:  
 
GOALS OF CARE: 
Patient/Health Care Proxy Stated Goals: Cure TREATMENT PREFERENCES:  
Code Status: DNR Advance Care Planning: 
[x] The [facility] Interdisciplinary Team has updated the ACP Navigator with [address] and Patient Capacity Primary Decision Maker (Active): Adonis Winn - Spouse - [phone] Secondary Decision Maker: Sindi Magana - Other Relative - [phone] Advance Care Planning 4/7/2021 Confirm Advance Directive Yes, on file Patient Would Like to Complete Advance Directive - Medical Interventions: Limited additional interventions Other Instructions: Other: As far as possible, the palliative care team has discussed with patient / health care proxy about goals of care / treatment preferences for patient. HISTORY:  
 
History obtained from: chart, family CHIEF COMPLAINT: none- intubated and sedated HPI/SUBJECTIVE: The patient is:  
[] Verbal and participatory [x] Non-participatory due to:  
condition Clinical Pain Assessment (nonverbal scale for severity on nonverbal patients):  
Clinical Pain Assessment Severity: 0 Activity (Movement): Lying quietly, normal position Duration: for how long has pt been experiencing pain (e.g., 2 days, 1 month, years) Frequency: how often pain is an issue (e.g., several times per day, once every few days, constant) FUNCTIONAL ASSESSMENT:  
 
Palliative Performance Scale (PPS): PPS: 20 
 
 
 PSYCHOSOCIAL/SPIRITUAL SCREENING:  
 
Palliative IDT has assessed this patient for cultural preferences / practices and a referral made as appropriate to needs (Cultural Services, Patient Advocacy, Ethics, etc.) Any spiritual / Congregational concerns: 
[] Yes /  [x] No 
 
Caregiver Burnout: 
[] Yes /  [x] No /  [] No Caregiver Present Anticipatory grief assessment:  
[x] Normal  / [] Maladaptive ESAS Anxiety: Anxiety: 0 
 
ESAS Depression: Depression: 0 REVIEW OF SYSTEMS:  
 
Positive and pertinent negative findings in ROS are noted above in HPI. The following systems were [x] reviewed / [] unable to be reviewed as noted in HPI Other findings are noted below. Systems: constitutional, ears/nose/mouth/throat, respiratory, gastrointestinal, genitourinary, musculoskeletal, integumentary, neurologic, psychiatric, endocrine. Positive findings noted below. Modified ESAS Completed by: provider Fatigue: 10    
Depression: 0 Pain: 0 Anxiety: 0 Nausea: 0 Dyspnea: 0 Stool Occurrence(s): 1 PHYSICAL EXAM:  
 
From RN flowsheet: 
Wt Readings from Last 3 Encounters:  
04/15/21 275 lb 9.2 oz (125 kg) 03/10/21 232 lb (105.2 kg) 02/08/21 264 lb (119.7 kg) Blood pressure (!) 103/51, pulse 91, temperature 97.7 °F (36.5 °C), resp. rate 18, height 5' 10\" (1.778 m), weight 275 lb 9.2 oz (125 kg), SpO2 100 %.  
 
Pain Scale 1: Behavioral Pain Scale (BPS) Pain Intensity 1: 3 Pain Onset 1: acute Pain Location 1: Leg 
Pain Orientation 1: Right Pain Description 1: Aching Pain Intervention(s) 1: Medication (see MAR) Last bowel movement, if known:  
 
Constitutional: sedated ENMT: no nasal discharge, moist mucous membranes Respiratory: on vent Skin: warm, dry, surgical sites to groin, two drains Other: 
 
 
 HISTORY:  
 
Active Problems: 
  Chest pain (11/23/2020) CHF (congestive heart failure) ([address] (4/7/2021) NSTEMI (non-ST elevated myocardial infarction) ([address] (4/7/2021) S/P cardiac cath (4/8/2021) Overview: 4/13/2021: s/p PCI/ADDIS to mid LAD x 1  
    4/8/2021: cardiac cath showed MVD. Mod/severe MR, elevated PA pressures DNR (do not resuscitate) discussion () Goals of care, counseling/discussion () Need for emotional support () Shortness of breath () Hypovolemic shock (HCC) () Cardiogenic shock (HCC) () Past Medical History:  
Diagnosis Date  Acquired lymphedema Right arm  Acute respiratory failure ([address] 12/19/2020  Arrhythmia  Asthma  Atrial fibrillation ([address] Dr. Martina Jean Baptiste and Dr. Rhianna Corral [facility])  Cancer ([address] bilateral breast  
 Chronic kidney disease  Diabetes mellitus type II   
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  
Procedure Laterality Date  HX AFIB ABLATION    
 HX AMPUTATION FOOT Left 04/2020  HX BREAST RECONSTRUCTION Bilateral   
 HX CARPAL TUNNEL RELEASE [address] [facility] [address] [facility] [address] [place] [address] RIGHT MODIFIED RADICAL   
 HX MASTECTOMY Left   
 no lymphnode removal  
 HX MOHS PROCEDURES  1995  
 right  HX ORTHOPAEDIC Right   
 knee surgery  HX PACEMAKER  11/17/2020 Dr. Faisal Latif. Insertion of permanent pacemaker. AV node ablation  HX PACEMAKER    
 IR INSERT TUNL CVC W PORT OVER 5 YEARS    
 IR INSERT TUNL CVC W/O PORT OVER 5 YR  5/4/2020  IR INSERT TUNL CVC W/O PORT OVER 5 YR  9/8/2020  IR REMOVE TUNL CVAD W/O PORT / PUMP  6/26/2020  IR REMOVE TUNL CVAD W/O PORT / PUMP  10/19/2020  IN ABDOMEN SURGERY PROC UNLISTED    
 gastric bypass [address] AND 1994  IN GASTRIC BYPASS,OBESE<150CM [id number]  7/08  
 [facility]  IN ICAR CATHETER ABLATION ATRIOVENTR NODE FUNCTION N/A 11/17/2020 ABLATION AV NODE performed by Ladarius Danielson MD at [address] CATH LAB [address]  IN NEEDLE BIOPSY LIVER,W OTHR PROC  8.21.07  
 IN RELOCATION OF SKIN POCKET FOR PACEMAKER N/A 4/24/2020 RELOCATE [facility] performed by Shad Hubbard MD at [address] CATH LAB  IN RMVL TRANSVNS PM ELTRD 1 LEAD SYS ATR/VENTR N/A 4/24/2020 Remove Pacemaker Dual Leads performed by Shad Hubbard MD at [facility] CATH LAB  IN RMVL TRANSVNS PM ELTRD 1 LEAD SYS ATR/VENTR N/A 4/27/2020 REMOVE PACEMAKER DUAL LEADS performed by Shad Hubbard MD at [address] CATH LAB  IN TCAT INSJ/RPL PERM LEADLESS PACEMAKER RV W/IMG N/A 11/17/2020 INSERT OR REPLACE TRANSCATH PPM LEADLESS performed by Ladarius Danielson MD at [address] CATH LAB  IN TRABECULOPLASTY BY LASER SURGERY    
 US GUIDE ASP OVARIAN CYST ABD APPROACH  8.21.07  
 RIGHT Family History Problem Relation Age of Onset  Cancer Mother  Heart Disease Mother  Alcohol abuse Father  Diabetes Brother  Hypertension Brother History reviewed, no pertinent family history. Social History Tobacco Use  Smoking status: Never Smoker  Smokeless tobacco: Never Used Substance Use Topics  Alcohol use: Not Currently Allergies Allergen Reactions  Avapro [Irbesartan] Unable to Obtain  Bactrim [Sulfamethoxazole-Trimethoprim] Other (comments) Sore mouth  Contrast Agent [Iodine] Itching [address]  Morphine Nausea and Vomiting and Swelling  Penicillins Hives Did not tolerate cefepime 3/2020  Pravachol [Pravastatin] Nausea Only  Seafood [Shellfish Containing Products] Hives  Singulair [Montelukast] Other (comments)  
  palpitations  Ace Inhibitors Cough  Amlodipine Swelling  Captopril Cough  Carvedilol Diarrhea Current Facility-Administered Medications Medication Dose Route Frequency  0.9% sodium chloride infusion 250 mL  250 mL IntraVENous PRN  
 insulin lispro (HUMALOG) injection   SubCUTAneous Q6H  
 dextrose (D50W) injection syrg 12.5-25 g  12.5-25 g IntraVENous PRN  
 dextrose 5% infusion  75 mL/hr IntraVENous CONTINUOUS  
 fentaNYL (PF) 1,500 mcg/30 mL (50 mcg/mL) infusion  0-200 mcg/hr IntraVENous TITRATE  propofol (DIPRIVAN) 10 mg/mL infusion  0-50 mcg/kg/min IntraVENous TITRATE  chlorhexidine (ORAL CARE KIT) 0.12 % mouthwash 15 mL  15 mL Oral Q12H  clopidogreL (PLAVIX) tablet 75 mg  75 mg Oral DAILY  fentaNYL citrate (PF) injection 50 mcg  50 mcg IntraVENous Q2H PRN  
 fentaNYL citrate (PF) injection 25 mcg  25 mcg IntraVENous Q1H PRN  
 aspirin chewable tablet 81 mg  81 mg Per G Tube DAILY  dextrose (D50W) injection syrg 12.5-25 g  12.5-25 g IntraVENous PRN  
 EPINEPHrine (ADRENALIN) 10 mg in 0.9% sodium chloride 250 mL infusion  0-20 mcg/min IntraVENous TITRATE  
 NOREPINephrine (LEVOPHED) 32 mg in 5% dextrose 250 mL infusion  0.5-100 mcg/min IntraVENous TITRATE  famotidine (PF) (PEPCID) 20 mg in 0.9% sodium chloride 10 mL injection  20 mg IntraVENous Q24H  
 0.9% sodium chloride infusion 250 mL  250 mL IntraVENous PRN  
 sodium chloride (NS) flush 5-40 mL  5-40 mL IntraVENous Q8H  
 sodium chloride (NS) flush 5-40 mL  5-40 mL IntraVENous PRN  
 naloxone (NARCAN) injection 0.4 mg  0.4 mg IntraVENous PRN  
 vasopressin (VASOSTRICT) 20 Units in 0.9% sodium chloride 100 mL infusion  0-0.03 Units/min IntraVENous TITRATE  heparin (porcine) 2,000 Units in 0.9% sodium chloride 500 mL Irrigation    PRN  
 balsam peru-castor oiL (VENELEX) ointment   Topical BID  atorvastatin (LIPITOR) tablet 10 mg  10 mg Oral DAILY  [Held by provider] apixaban (ELIQUIS) tablet 5 mg  5 mg Oral Q12H  
 magic mouthwash cpd (without sucralfate)  5 mL Oral TID PRN  
 DOBUTamine (DOBUTREX) 500 mg/250 mL (2,000 mcg/mL) infusion  0-10 mcg/kg/min IntraVENous TITRATE  [Held by provider] carvediloL (COREG) tablet 3.125 mg  3.125 mg Oral BID WITH MEALS  
 alcohol 62% (NOZIN) nasal  1 Ampule  1 Ampule Topical Q12H  
 nitroglycerin (NITROSTAT) tablet 0.4 mg  0.4 mg SubLINGual PRN  
 sodium chloride (NS) flush 5-40 mL  5-40 mL IntraVENous Q8H  
 sodium chloride (NS) flush 5-40 mL  5-40 mL IntraVENous PRN  
 acetaminophen (TYLENOL) tablet 650 mg  650 mg Oral Q6H PRN Or  
 acetaminophen (TYLENOL) suppository 650 mg  650 mg Rectal Q6H PRN  polyethylene glycol (MIRALAX) packet 17 g  17 g Oral DAILY PRN  promethazine (PHENERGAN) tablet 12.5 mg  12.5 mg Oral Q6H PRN Or  
 ondansetron (ZOFRAN) injection 4 mg  4 mg IntraVENous Q6H PRN  
 glucose chewable tablet 16 g  4 Tab Oral PRN  
 dextrose (D50W) injection syrg 12.5-25 g  12.5-25 g IntraVENous PRN  
 glucagon (GLUCAGEN) injection 1 mg  1 mg IntraMUSCular PRN  
 
 
 
 LAB AND IMAGING FINDINGS:  
 
Lab Results Component Value Date/Time  WBC 14.6 (H) 04/16/2021 03:24 AM  
 HGB 9.0 (L) 04/16/2021 03:24 AM  
 PLATELET 212 (L) 73/96/2673 03:24 AM  
 
Lab Results Component Value Date/Time Sodium 132 (L) 04/16/2021 03:24 AM  
 Potassium 4.0 04/16/2021 03:24 AM  
 Chloride 105 04/16/2021 03:24 AM  
 CO2 21 04/16/2021 03:24 AM  
 BUN 34 (H) 04/16/2021 03:24 AM  
 Creatinine 2.22 (H) 04/16/2021 03:24 AM  
 Calcium 7.9 (L) 04/16/2021 03:24 AM  
 Magnesium 2.2 04/16/2021 03:24 AM  
 Phosphorus 4.6 04/16/2021 03:24 AM  
  
Lab Results Component Value Date/Time Alk. phosphatase 46 04/16/2021 03:24 AM  
 Protein, total 4.8 (L) 04/16/2021 03:24 AM  
 Albumin 2.5 (L) 04/16/2021 03:24 AM  
 Globulin 2.3 04/16/2021 03:24 AM  
 
Lab Results Component Value Date/Time INR 1.1 04/16/2021 03:24 AM  
 Prothrombin time 11.5 (H) 04/16/2021 03:24 AM  
 aPTT 40.3 (H) 04/14/2021 09:54 AM  
  
Lab Results Component Value Date/Time Iron 10 (L) 12/23/2020 05:29 AM  
 TIBC 301 12/23/2020 05:29 AM  
 Iron % saturation 3 (L) 12/23/2020 05:29 AM  
 Ferritin 17 (L) 12/23/2020 05:29 AM  
  
No results found for: PH, PCO2, PO2 No components found for: [place] Lab Results Component Value Date/Time CK 4,609 (H) 05/14/2020 05:15 AM  
 CK - MB 1.3 04/09/2014 11:00 AM  
  
 
 
   
 
Total time:   
Counseling / coordination time, spent as noted above: 
> 50% counseling / coordination?:  
 
Prolonged service was provided for  []30 min   []75 min in face to face time in the presence of the patient, spent as noted above. Time Start:  
Time End:  
Note: this can only be billed with 62939 (initial) or 44379 (follow up). If multiple start / stop times, list each separately.

## 2021-04-16 NOTE — PROGRESS NOTES
Comprehensive Nutrition Assessment Type and Reason for Visit: Sayra Enciso Nutrition Recommendations/Plan:  
Consider initiation of nutrition support (pressors need to come down before TF can be started) Nutrition Assessment:   Chart reviewed, case discussed during CCU rounds. Pt remains intubated and sedated with propofol @ 15.6mL/h, which provides 412 kcals daily. OGT with minimal OP. Epi at 13, levo at 30 and dobutamine at 10. Pt is a DNR. Being diuresed as she is + 11L (Wednesday morning she was only + 2.69L). Not appropriate for enteral feeds yet, but given significant fluid overload would not recommend TPN at this time. Will monitor GOC and recommend initiation of nutrition support (TF vs TPN) Monday. Pt is NPO day 4 today. Estimated Daily Nutrient Needs: 
Energy (kcal): [employer]SU 2005 ([address] [place] [address]); Weight Used for Energy Requirements: Current Protein (g): 83-104g (0.8-1gPro/kg); Weight Used for Protein Requirements: Current Fluid (ml/day): 1900mL; Method Used for Fluid Requirements: 1 ml/kcal 
 
 
Nutrition Related Findings:  Meds: [email or web address], [email or web address], [email or web address]; Drips: epi, levo, dobutamine, propofol, fentanyl. Edema: +4 pitting-RLE, +3 pitting-LLE, +2 pitting-LUE, +1 trace-RUE. BM 4/12 Wounds:   
Surgical incision Current Nutrition Therapies: 
No diet orders on file Anthropometric Measures: 
· Height:  5' 10\" (177.8 cm) · Current Body Wt:  125 kg (275 lb 9.2 oz) · Ideal Body Wt:  150 lbs:  153.1 % · BMI Category:  Obese class 2 (BMI 35.0-39. 9) Nutrition Diagnosis:  
· Inadequate protein-energy intake related to impaired respiratory function as evidenced by NPO or clear liquid status due to medical condition Previous dx continues. Nutrition Interventions:  
Food and/or Nutrient Delivery: Continue NPO Nutrition Education and Counseling: No recommendations at this time Coordination of Nutrition Care: Continue to monitor while inpatient, Interdisciplinary rounds Goals: 
Plan of care will be determined re: nutrition in 1-3 days. Nutrition Monitoring and Evaluation:  
Behavioral-Environmental Outcomes: None identified Food/Nutrient Intake Outcomes: IVF intake Physical Signs/Symptoms Outcomes: Biochemical data, Nutrition focused physical findings, Skin, Weight, GI status, Fluid status or edema, Hemodynamic status Discharge Planning: Too soon to determine Electronically signed by Benjamin Alvarez RD, [address]  on 4/16/2021 at 12:41 PM 
 
Contact: [phone]

## 2021-04-16 NOTE — PROGRESS NOTES
04/16/21 3411 ABCDEF Bundle SBT Safety Screen Passed No  
SBT Screen Reason for Failure Vasopressor use

## 2021-04-16 NOTE — PROGRESS NOTES
Nephrology Progress Note [facility] Kiser  
 
[facility]. [facility]  Phone - [phone] Patient: Sonido Carrillo    YOB: 1960 Date- 4/16/2021   Admit Date: 4/7/2021 CC: Follow up for PARAG on CKD IMPRESSION & PLAN:  
· ACUTE KIDNEY INJURY - likely due to ATN,CRS from suppressed EF from recent MI and NOE.  
· HYPONATREMIA- DUE TO CHF AND HYPOTONIC IVF D5W 
· Hypoglycemia · Hypokalemia · Large tense painful hematoma R groin/thigh after R CFA access for cardiac cath · Shock- cardiogenic + hemorrhagic · Edema- volume overload · Iron defi anemia · Hyponatremia due to chf 
· NSTEMI: s/p cardiac cath which showed MVD, mod/severe MR, and PHTN, cardiogenic shock due to ischemic cardiomyopathy- s/p PCI/ADDIS to mid LAD  on 4-13-21 
· afib - s/p AV node ablation · Anemia - CHRONIC- f/b DR. RUIZ 
· hypertension · H/O LEFT BKA · ckd  3 LIKELY due to DM nephropathy and HTN nephrosclerosis- BL CR. 1.5-1.9 
· H/O Diabetic foot · Proteinuria likely due to DM nephropathy and HTN nephroscl- urine pr/cr 1.5 g/g · Vit d defi PLAN- 
· BUMEX prn · Continue vasopressor support · CHANGE ivf to d10 or consider starting tube feed for hypoglycemia · Follow BMP · Avoid hypotension · Poor prognosis Subjective: Interval History:  
-remained on vent She is on multiple vasopressors She had >2200 ml urine out put with bumex She is hypoglycemia- on d 5 w 
 
4/14/21 She developed hematoma after cardiac cath 4/13/21 She received 7 units PRBC's ,FP x 2, and platelets x 1 She is on the vent Blood pressure low requiring vasopressor support Objective:  
Vitals:  
 04/16/21 0545 04/16/21 0600 04/16/21 0615 04/16/21 0630 BP:  (!) 99/52  (!) 101/51 Pulse: 90 90 91 90 Resp: 18 18 18 18 Temp: 97.3 °F (36.3 °C) 97.3 °F (36.3 °C) 97.3 °F (36.3 °C) 97.3 °F (36.3 °C) SpO2: 100% 100% 100% 100% Weight:      
Height:      
  
04/15 0701 - 04/16 0700 In: 26 [G.Y.:6731] Out: 7405 [Urine:2030; Drains:165] Last 3 Recorded Weights in this Encounter 04/12/21 1400 04/14/21 2335 04/15/21 2352 Weight: 104.2 kg (229 lb 11.5 oz) 124.7 kg (274 lb 14.6 oz) 125 kg (275 lb 9.2 oz) Physical exam:  
GEN: intubated on vent NECK-no mass, ETTUBE 
RESP: clear b/l, no wheezing CVS: RRR,S1,S2 EXT: ++ Edema ,bka stump + NEURO:Can't access due to patient's current condition  : Munroe + Chart reviewed. Pertinent Notes reviewed. Data Review : 
Recent Labs 04/16/21 
7379 04/15/21 
0503 04/14/21 
5001 * 136 134* K 4.0 3.4* 4.0  
 106 106 CO2 21 21 19* BUN 34* 36* 40* CREA 2.22* 2.29* 2.43* GLU 85 108* 254* CA 7.9* 8.0* 7.7* MG 2.2 1.7 1.8 PHOS 4.6 4.6 5.7* Recent Labs 04/16/21 
2858 04/15/21 
1757 04/15/21 
1103 04/15/21 
0503 04/14/21 
0357 04/14/21 
5645 WBC 14.6*  --   --  14.9*  --  16.5* HGB 9.0* 7.1* 7.9* 8.3*   < > 8.8* HCT 25.7*  --  22.6* 24.1*   < > 26.3*  
*  --   --  125*  --  156  
 < > = values in this interval not displayed. No results for input(s): FE, TIBC, PSAT, FERR in the last 72 hours. Medication list  reviewed Current Facility-Administered Medications Medication Dose Route Frequency  0.9% sodium chloride infusion 250 mL  250 mL IntraVENous PRN  
 insulin lispro (HUMALOG) injection   SubCUTAneous Q6H  
 dextrose (D50W) injection syrg 12.5-25 g  12.5-25 g IntraVENous PRN  
 dextrose 5% infusion  75 mL/hr IntraVENous CONTINUOUS  
 fentaNYL (PF) 1,500 mcg/30 mL (50 mcg/mL) infusion  0-200 mcg/hr IntraVENous TITRATE  propofol (DIPRIVAN) 10 mg/mL infusion  0-50 mcg/kg/min IntraVENous TITRATE  chlorhexidine (ORAL CARE KIT) 0.12 % mouthwash 15 mL  15 mL Oral Q12H  clopidogreL (PLAVIX) tablet 75 mg  75 mg Oral DAILY  fentaNYL citrate (PF) injection 50 mcg  50 mcg IntraVENous Q2H PRN  
 fentaNYL citrate (PF) injection 25 mcg  25 mcg IntraVENous Q1H PRN  
 aspirin chewable tablet 81 mg  81 mg Per G Tube DAILY  dextrose (D50W) injection syrg 12.5-25 g  12.5-25 g IntraVENous PRN  
 EPINEPHrine (ADRENALIN) 10 mg in 0.9% sodium chloride 250 mL infusion  0-20 mcg/min IntraVENous TITRATE  
 NOREPINephrine (LEVOPHED) 32 mg in 5% dextrose 250 mL infusion  0.5-100 mcg/min IntraVENous TITRATE  famotidine (PF) (PEPCID) 20 mg in 0.9% sodium chloride 10 mL injection  20 mg IntraVENous Q24H  
 0.9% sodium chloride infusion 250 mL  250 mL IntraVENous PRN  
 sodium chloride (NS) flush 5-40 mL  5-40 mL IntraVENous Q8H  
 sodium chloride (NS) flush 5-40 mL  5-40 mL IntraVENous PRN  
 naloxone (NARCAN) injection 0.4 mg  0.4 mg IntraVENous PRN  
 vasopressin (VASOSTRICT) 20 Units in 0.9% sodium chloride 100 mL infusion  0-0.03 Units/min IntraVENous TITRATE  heparin (porcine) 2,000 Units in 0.9% sodium chloride 500 mL Irrigation    PRN  
 balsam peru-castor oiL (VENELEX) ointment   Topical BID  atorvastatin (LIPITOR) tablet 10 mg  10 mg Oral DAILY  [Held by provider] apixaban (ELIQUIS) tablet 5 mg  5 mg Oral Q12H  
 magic mouthwash cpd (without sucralfate)  5 mL Oral TID PRN  
 DOBUTamine (DOBUTREX) 500 mg/250 mL (2,000 mcg/mL) infusion  0-10 mcg/kg/min IntraVENous TITRATE  [Held by provider] carvediloL (COREG) tablet 3.125 mg  3.125 mg Oral BID WITH MEALS  
 alcohol 62% (NOZIN) nasal  1 Ampule  1 Ampule Topical Q12H  
 nitroglycerin (NITROSTAT) tablet 0.4 mg  0.4 mg SubLINGual PRN  
 sodium chloride (NS) flush 5-40 mL  5-40 mL IntraVENous Q8H  
 sodium chloride (NS) flush 5-40 mL  5-40 mL IntraVENous PRN  
 acetaminophen (TYLENOL) tablet 650 mg  650 mg Oral Q6H PRN Or  
 acetaminophen (TYLENOL) suppository 650 mg  650 mg Rectal Q6H PRN  polyethylene glycol (MIRALAX) packet 17 g  17 g Oral DAILY PRN  promethazine (PHENERGAN) tablet 12.5 mg  12.5 mg Oral Q6H PRN  Or  
 ondansetron (ZOFRAN) injection 4 mg  4 mg IntraVENous Q6H PRN  
 glucose chewable tablet 16 g  4 Tab Oral PRN  
 dextrose (D50W) injection syrg 12.5-25 g  12.5-25 g IntraVENous PRN  
 glucagon (GLUCAGEN) injection 1 mg  1 mg IntraMUSCular PRN Letha Wyatt, [address] [facility] / [employer] [address] [place], [address] Phone - [phone]               Fax - [phone]

## 2021-04-16 NOTE — PROGRESS NOTES
[id number] [facility] Adult  Hospitalist Group Critical Care Progress Note Víctor Salguero MD 
Answering service: [phone] OR [phone] from in house phone Date of Service:  2021 NAME:  Kylie Recinos :  1960 MRN:  [id number] Interval history / Subjective: Hb seems to be stabilizing, will try to cut down the fentanyl and see where we can go With the pressors and then reeval for extubation. Assessment & Plan:  
 
NSTEMI: with multivessel disease, not a candidate for CABG per CTS. On dobutamine  for decompensated heart failure Management per cards, continue Erica Sin Sp  high risk PCI  got stent to LAD Echo today  
  
Right Groin Hematoma from Arterial Bleed sp exploration/evacuation and arterial ligation - Multiple PRBCs, FFP and platelets ordered - Fu labs - Multipressor support - Made DNR by HCS.    
  
  
Acute decompensated heart failure with cardiogenic shock. Dobutamine drip Continue diuresis as tolerated Closely monitor hemodynamics, has PA catheter - Go by A line for BP measurements.  
  
Paroxysmal AF Rate controlled. Apixaban on hold presently 
  
PARAG on CKD4. Cr slightly improved  Cardiorenal syndrome Nephrology following 
  
Elevated LFTs - likely hepatic congestion DM2: Lantus; Lispro w meals; SSI HLD: Holding statin due to transaminitis Anxiety: Zoloft 
  
Dispo:  Hb seems to be stabilizing, will try to cut down the fentanyl and see where we can go With the pressors and then reeval for extubation. I personally spent   40   minutes of critical care time.  This is time spent at this critically ill patient's bedside actively involved in patient care as well as the coordination of care and discussions with the patient's family.  This does not include any procedural time which has been billed separately.   
 
Hospital Problems  Date Reviewed: 3/1/2021 Codes Class Noted POA Hypovolemic shock ([facility] [address] ICD-10-CM: R57.1 ICD-9-CM: 785.59  Unknown Unknown Cardiogenic shock ([facility] [address] ICD-10-CM: R57.0 ICD-9-CM: 785.51  Unknown Unknown DNR (do not resuscitate) discussion ICD-10-CM: Z71.89 ICD-9-CM: V65.49  Unknown Unknown Goals of care, counseling/discussion ICD-10-CM: Z71.89 ICD-9-CM: V65.49  Unknown Unknown Need for emotional support ICD-10-CM: R45.89 ICD-9-CM: 799.29  Unknown Unknown Shortness of breath ICD-10-CM: R06.02 
ICD-9-CM: 786.05  Unknown Unknown S/P cardiac cath ICD-10-CM: Z98.890 ICD-9-CM: V45.89  4/8/2021 Unknown Overview Addendum 4/13/2021  2:57 PM by Paulie Guan NP  
  4/13/2021: s/p PCI/ADDIS to mid LAD x 1  
4/8/2021: cardiac cath showed MVD. Mod/severe MR, elevated PA pressures CHF (congestive heart failure) ([facility]) ICD-10-CM: I50.9 ICD-9-CM: 428.0  4/7/2021 Unknown NSTEMI (non-ST elevated myocardial infarction) ([facility] [address] ICD-10-CM: I21.4 ICD-9-CM: 410.70  4/7/2021 Unknown Chest pain ICD-10-CM: R07.9 ICD-9-CM: 786.50  11/23/2020 Unknown Review of Systems:  
Review of systems not obtained due to patient factors. Vital Signs:  
 Last 24hrs VS reviewed since prior progress note. Most recent are: 
Visit Vitals BP (!) 98/47 Pulse 90 Temp 98.1 °F (36.7 °C) Resp 11 Ht 5' 10\" (1.778 m) Wt 125 kg (275 lb 9.2 oz) SpO2 100% BMI 39.54 kg/m² Intake/Output Summary (Last 24 hours) at 4/16/2021 1202 Last data filed at 4/16/2021 1035 Gross per 24 hour Intake 4790.42 ml Output 2765 ml Net 2025.42 ml Physical Examination:  
 
 
     
Constitutional:  sedated ENT:  Oral mucous moist, oropharynx benign. Resp:  intubated, some crackles bilaterally. No wheezing/rhonchi/rales. No accessory muscle use CV:  Regular rhythm, normal rate, no murmurs, gallops, rubs GI:  Soft, non distended, non tender.  normoactive bowel sounds, no hepatosplenomegaly Musculoskeletal:  No edema, warm, 2+ pulses throughout Neurologic:  sedated Psych:  sedated Skin:  Good turgor, no rashes or ulcers Hematologic/Lymphatic/Immunlogic:  No jaundice nor lymph node swelling :  deferred Eyes:  sedated, PERRL Data Review:  
 Review and/or order of clinical lab test 
 
 
Labs:  
 
Recent Labs 04/16/21 
0541 04/15/21 
1757 04/15/21 
1103 04/15/21 
0503 WBC 14.6*  --   --  14.9* HGB 9.0* 7.1* 7.9* 8.3* HCT 25.7*  --  22.6* 24.1*  
*  --   --  125* Recent Labs 04/16/21 
9942 04/15/21 
0503 04/14/21 
8384 * 136 134* K 4.0 3.4* 4.0  
 106 106 CO2 21 21 19* BUN 34* 36* 40* CREA 2.22* 2.29* 2.43* GLU 85 108* 254* CA 7.9* 8.0* 7.7* MG 2.2 1.7 1.8 PHOS 4.6 4.6 5.7* Recent Labs 04/16/21 
5292 04/15/21 
0503 04/14/21 
4515 ALT 11* 14 16 AP 46 41* 46  
TBILI 1.6* 1.5* 1.7* TP 4.8* 4.7* 4.4* ALB 2.5* 2.4* 2.0*  
GLOB 2.3 2.3 2.4 Recent Labs 04/16/21 
9914 04/15/21 
0503 04/14/21 
2256 04/14/21 
7060 04/14/21 
1820 INR 1.1 1.2* 1.3*   < > 1.6* PTP 11.5* 12.6* 13.0*   < > 16.6* APTT  --   --   --   --  40.3*  
 < > = values in this interval not displayed. No results for input(s): FE, TIBC, PSAT, FERR in the last 72 hours. Lab Results Component Value Date/Time Folate 8.5 03/14/2020 02:49 PM  
  
No results for input(s): PH, PCO2, PO2 in the last 72 hours. No results for input(s): CPK, CKNDX, TROIQ in the last 72 hours. No lab exists for component: CPKMB Lab Results Component Value Date/Time Cholesterol, total 79 04/11/2021 04:25 AM  
 HDL Cholesterol 18 04/11/2021 04:25 AM  
 LDL, calculated 44.6 04/11/2021 04:25 AM  
 Triglyceride 82 04/11/2021 04:25 AM  
 CHOL/HDL Ratio 4.4 04/11/2021 04:25 AM  
 
Lab Results Component Value Date/Time  Glucose (POC) 80 04/16/2021 06:10 AM  
 Glucose (POC) 93 04/16/2021 03:34 AM  
 Glucose (POC) 117 (H) 04/15/2021 11:51 PM  
 Glucose (POC) 45 (LL) 04/15/2021 11:40 PM  
 Glucose (POC) 41 (LL) 04/15/2021 11:17 PM  
 
Lab Results Component Value Date/Time Color YELLOW/STRAW 12/19/2020 04:48 PM  
 Appearance CLOUDY (A) 12/19/2020 04:48 PM  
 Specific gravity 1.013 12/19/2020 04:48 PM  
 pH (UA) 5.5 12/19/2020 04:48 PM  
 Protein 100 (A) 12/19/2020 04:48 PM  
 Glucose Negative 12/19/2020 04:48 PM  
 Ketone Negative 12/19/2020 04:48 PM  
 Bilirubin Negative 12/19/2020 04:48 PM  
 Urobilinogen 1.0 12/19/2020 04:48 PM  
 Nitrites Positive (A) 12/19/2020 04:48 PM  
 Leukocyte Esterase TRACE (A) 12/19/2020 04:48 PM  
 Epithelial cells FEW 12/19/2020 04:48 PM  
 Bacteria 4+ (A) 12/19/2020 04:48 PM  
 WBC 5-10 12/19/2020 04:48 PM  
 RBC 0-5 12/19/2020 04:48 PM  
 
 
 
Medications Reviewed:  
 
Current Facility-Administered Medications Medication Dose Route Frequency  0.9% sodium chloride infusion 250 mL  250 mL IntraVENous PRN  
 insulin lispro (HUMALOG) injection   SubCUTAneous Q6H  
 dextrose (D50W) injection syrg 12.5-25 g  12.5-25 g IntraVENous PRN  
 dextrose 5% infusion  75 mL/hr IntraVENous CONTINUOUS  
 fentaNYL (PF) 1,500 mcg/30 mL (50 mcg/mL) infusion  0-200 mcg/hr IntraVENous TITRATE  propofol (DIPRIVAN) 10 mg/mL infusion  0-50 mcg/kg/min IntraVENous TITRATE  chlorhexidine (ORAL CARE KIT) 0.12 % mouthwash 15 mL  15 mL Oral Q12H  clopidogreL (PLAVIX) tablet 75 mg  75 mg Oral DAILY  fentaNYL citrate (PF) injection 50 mcg  50 mcg IntraVENous Q2H PRN  
 fentaNYL citrate (PF) injection 25 mcg  25 mcg IntraVENous Q1H PRN  
 aspirin chewable tablet 81 mg  81 mg Per G Tube DAILY  EPINEPHrine (ADRENALIN) 10 mg in 0.9% sodium chloride 250 mL infusion  0-20 mcg/min IntraVENous TITRATE  
 NOREPINephrine (LEVOPHED) 32 mg in 5% dextrose 250 mL infusion  0.5-100 mcg/min IntraVENous TITRATE  famotidine (PF) (PEPCID) 20 mg in 0.9% sodium chloride 10 mL injection  20 mg IntraVENous Q24H  
 0.9% sodium chloride infusion 250 mL  250 mL IntraVENous PRN  
 sodium chloride (NS) flush 5-40 mL  5-40 mL IntraVENous Q8H  
 sodium chloride (NS) flush 5-40 mL  5-40 mL IntraVENous PRN  
 naloxone (NARCAN) injection 0.4 mg  0.4 mg IntraVENous PRN  
 vasopressin (VASOSTRICT) 20 Units in 0.9% sodium chloride 100 mL infusion  0-0.03 Units/min IntraVENous TITRATE  heparin (porcine) 2,000 Units in 0.9% sodium chloride 500 mL Irrigation    PRN  
 balsam peru-castor oiL (VENELEX) ointment   Topical BID  atorvastatin (LIPITOR) tablet 10 mg  10 mg Oral DAILY  [Held by provider] apixaban (ELIQUIS) tablet 5 mg  5 mg Oral Q12H  
 magic mouthwash cpd (without sucralfate)  5 mL Oral TID PRN  
 DOBUTamine (DOBUTREX) 500 mg/250 mL (2,000 mcg/mL) infusion  0-10 mcg/kg/min IntraVENous TITRATE  [Held by provider] carvediloL (COREG) tablet 3.125 mg  3.125 mg Oral BID WITH MEALS  
 alcohol 62% (NOZIN) nasal  1 Ampule  1 Ampule Topical Q12H  
 nitroglycerin (NITROSTAT) tablet 0.4 mg  0.4 mg SubLINGual PRN  
 sodium chloride (NS) flush 5-40 mL  5-40 mL IntraVENous Q8H  
 sodium chloride (NS) flush 5-40 mL  5-40 mL IntraVENous PRN  
 acetaminophen (TYLENOL) tablet 650 mg  650 mg Oral Q6H PRN Or  
 acetaminophen (TYLENOL) suppository 650 mg  650 mg Rectal Q6H PRN  polyethylene glycol (MIRALAX) packet 17 g  17 g Oral DAILY PRN  promethazine (PHENERGAN) tablet 12.5 mg  12.5 mg Oral Q6H PRN Or  
 ondansetron (ZOFRAN) injection 4 mg  4 mg IntraVENous Q6H PRN  
 glucose chewable tablet 16 g  4 Tab Oral PRN  
 glucagon (GLUCAGEN) injection 1 mg  1 mg IntraMUSCular PRN  
 
______________________________________________________________________ EXPECTED LENGTH OF STAY: 3d 22h ACTUAL LENGTH OF STAY:          9 
 
            
Va Kim MD

## 2021-04-17 NOTE — PROGRESS NOTES
4/17/2021 10:11 AM 
 
Admit Date: 4/7/2021 Admit Diagnosis: Chest pain [R07.9];NSTEMI (non-ST elevated myocardial infarction) ([facility]) [I21.4];[facility] [I50.9] Subjective:  
 
Virginia Morrow remains critically ill. On vent and multiple pressors. Visit Vitals BP (!) 78/39 Pulse 91 Temp 100.3 °F (37.9 °C) Resp 19 Ht 5' 10\" (1.778 m) Wt 275 lb 9.2 oz (125 kg) SpO2 100% BMI 39.54 kg/m² Current Facility-Administered Medications Medication Dose Route Frequency  dextrose 10% infusion  50 mL/hr IntraVENous CONTINUOUS  
 0.9% sodium chloride infusion 250 mL  250 mL IntraVENous PRN  
 insulin lispro (HUMALOG) injection   SubCUTAneous Q6H  
 dextrose (D50W) injection syrg 12.5-25 g  12.5-25 g IntraVENous PRN  
 fentaNYL (PF) 1,500 mcg/30 mL (50 mcg/mL) infusion  0-200 mcg/hr IntraVENous TITRATE  propofol (DIPRIVAN) 10 mg/mL infusion  0-50 mcg/kg/min IntraVENous TITRATE  chlorhexidine (ORAL CARE KIT) 0.12 % mouthwash 15 mL  15 mL Oral Q12H  clopidogreL (PLAVIX) tablet 75 mg  75 mg Oral DAILY  fentaNYL citrate (PF) injection 50 mcg  50 mcg IntraVENous Q2H PRN  
 fentaNYL citrate (PF) injection 25 mcg  25 mcg IntraVENous Q1H PRN  
 aspirin chewable tablet 81 mg  81 mg Per G Tube DAILY  EPINEPHrine (ADRENALIN) 10 mg in 0.9% sodium chloride 250 mL infusion  0-20 mcg/min IntraVENous TITRATE  
 NOREPINephrine (LEVOPHED) 32 mg in 5% dextrose 250 mL infusion  0.5-100 mcg/min IntraVENous TITRATE  famotidine (PF) (PEPCID) 20 mg in 0.9% sodium chloride 10 mL injection  20 mg IntraVENous Q24H  
 0.9% sodium chloride infusion 250 mL  250 mL IntraVENous PRN  
 sodium chloride (NS) flush 5-40 mL  5-40 mL IntraVENous Q8H  
 sodium chloride (NS) flush 5-40 mL  5-40 mL IntraVENous PRN  
 naloxone (NARCAN) injection 0.4 mg  0.4 mg IntraVENous PRN  
 vasopressin (VASOSTRICT) 20 Units in 0.9% sodium chloride 100 mL infusion  0-0.03 Units/min IntraVENous TITRATE  heparin (porcine) 2,000 Units in 0.9% sodium chloride 500 mL Irrigation    PRN  
 balsam peru-castor oiL (VENELEX) ointment   Topical BID  atorvastatin (LIPITOR) tablet 10 mg  10 mg Oral DAILY  [Held by provider] apixaban (ELIQUIS) tablet 5 mg  5 mg Oral Q12H  
 magic mouthwash cpd (without sucralfate)  5 mL Oral TID PRN  
 DOBUTamine (DOBUTREX) 500 mg/250 mL (2,000 mcg/mL) infusion  0-10 mcg/kg/min IntraVENous TITRATE  [Held by provider] carvediloL (COREG) tablet 3.125 mg  3.125 mg Oral BID WITH MEALS  
 alcohol 62% (NOZIN) nasal  1 Ampule  1 Ampule Topical Q12H  
 nitroglycerin (NITROSTAT) tablet 0.4 mg  0.4 mg SubLINGual PRN  
 sodium chloride (NS) flush 5-40 mL  5-40 mL IntraVENous Q8H  
 sodium chloride (NS) flush 5-40 mL  5-40 mL IntraVENous PRN  
 acetaminophen (TYLENOL) tablet 650 mg  650 mg Oral Q6H PRN Or  
 acetaminophen (TYLENOL) suppository 650 mg  650 mg Rectal Q6H PRN  polyethylene glycol (MIRALAX) packet 17 g  17 g Oral DAILY PRN  promethazine (PHENERGAN) tablet 12.5 mg  12.5 mg Oral Q6H PRN Or  
 ondansetron (ZOFRAN) injection 4 mg  4 mg IntraVENous Q6H PRN  
 glucose chewable tablet 16 g  4 Tab Oral PRN  
 glucagon (GLUCAGEN) injection 1 mg  1 mg IntraMUSCular PRN Objective:  
  
Visit Vitals BP (!) 78/39 Pulse 91 Temp 100.3 °F (37.9 °C) Resp 19 Ht 5' 10\" (1.778 m) Wt 275 lb 9.2 oz (125 kg) SpO2 100% BMI 39.54 kg/m² Physical Exam: On vent. Abdomen: soft, non-tender. Bowel sounds present. Extremities: left BKA, right groin hematoma, s/p evacuation. Heart: regular rate and rhythm, S1, S2 normal, no murmur, click, rub or gallop Lungs: bilateral air entry. On vent. Neurologic: sedated Data Review:  
Labs:   
Recent Results (from the past 24 hour(s)) GLUCOSE, POC Collection Time: 04/16/21 11:53 AM  
Result Value Ref Range Glucose (POC) 72 65 - 100 mg/dL  Performed by Tremayne Chaudhari GLUCOSE, POC Collection Time: 04/16/21 12:01 PM  
Result Value Ref Range Glucose (POC) 67 65 - 100 mg/dL Performed by [facility] GLUCOSE, POC Collection Time: 04/16/21 12:05 PM  
Result Value Ref Range Glucose (POC) 77 65 - 100 mg/dL Performed by [facility] GLUCOSE, POC Collection Time: 04/16/21 12:09 PM  
Result Value Ref Range Glucose (POC) 169 (H) 65 - 100 mg/dL Performed by [facility] CBC W/O DIFF Collection Time: 04/16/21  3:35 PM  
Result Value Ref Range WBC 12.6 (H) 3.6 - 11.0 K/uL  
 RBC 2.62 (L) 3.80 - 5.20 M/uL HGB 7.9 (L) 11.5 - 16.0 g/dL HCT 22.9 (L) 35.0 - 47.0 % MCV 87.4 80.0 - 99.0 FL  
 MCH 30.2 26.0 - 34.0 PG  
 MCHC 34.5 30.0 - 36.5 g/dL  
 RDW 16.1 (H) 11.5 - 14.5 % PLATELET 631 (L) 963 - 400 K/uL MPV 11.0 8.9 - 12.9 FL  
 NRBC 0.3 (H) 0  WBC ABSOLUTE NRBC 0.04 (H) 0.00 - 0.01 K/uL ECHO ADULT COMPLETE Collection Time: 04/16/21  5:28 PM  
Result Value Ref Range IVSd 1.41 (A) 0.60 - 0.90 cm LVIDd 4.50 3.90 - 5.30 cm LVIDs 3.86 cm  
 LVOT d 1.66 cm  
 LVPWd 1.21 (A) 0.60 - 0.90 cm LV Ejection Fraction MOD 4C 37 percent LV ED Vol A4C 76.25 mL  
 LV ES Vol A4C 48.10 mL  
 LVOT Cardiac Output 2.95 liter/minute LVOT Peak Gradient 3.91 mmHg LVOT Peak Gradient 3.45 mmHg Left Ventricular Outflow Tract Mean Gradient 1.85 mmHg LVOT SV 33.7 mL  
 LVOT Peak Velocity 92.83 cm/s LVOT Peak Velocity 98.86 cm/s LVOT VTI 15.56 cm  
 RVSP 48.22 mmHg Left Atrium Major Axis 4.48 cm  
 LA Area 4C 27.11 cm2 LA Vol 4C 105.84 (A) 22.00 - 52.00 mL Est. RA Pressure 10.00 mmHg Aortic Valve Area by Continuity of Peak Velocity 0.80 cm2 Aortic Valve Area by Continuity of Peak Velocity 0.85 cm2 Aortic Valve Area by Continuity of Peak Velocity 0.86 cm2 Aortic Valve Area by Continuity of Peak Velocity 0.81 cm2 Aortic Valve Area by Continuity of VTI 0.67 cm2 AoV PG 27.84 mmHg  AoV PG 28.28 mmHg Aortic Valve Systolic Mean Gradient 16.84 mmHg Aortic Valve Systolic Peak Velocity 632.93 cm/s Aortic Valve Systolic Peak Velocity 523.29 cm/s AoV VTI 50.53 cm  
 LV E' Lateral Velocity 9.77 cm/s LV E' Septal Velocity 7.95 cm/s  
 TR Max Velocity 428.40 cm/s Mitral Valve Pressure Half-time 74.37 ms  
 MVA (PHT) 2.96 cm2 Pulmonic Regurgitant End Max Velocity 219.15 cm/s Pulmonic Valve Systolic Peak Instantaneous Gradient 10.85 mmHg Pulmonic Valve Systolic Peak Instantaneous Gradient 10.61 mmHg Pulmonic Valve Systolic Mean Gradient 7.25 mmHg Pulmonic Valve Max Velocity 162.90 cm/s Pulmonic Valve Max Velocity 164.68 cm/s Pulmonic Valve Systolic Velocity Time Integral 18.21 cm Triscuspid Valve Regurgitation Peak Gradient 38.22 mmHg  
 TR Max Velocity 303.74 cm/s Ao Root D 2.89 cm  
 LV Mass .1 67.0 - 162.0 g  
 LV Mass AL Index 94.1 43.0 - 95.0 g/m2 Left Atrium Minor Axis 1.87 cm  
 LA Vol Index 44.25 16.00 - 28.00 ml/m2 LVED Vol Index A4C 31.9 mL/m2 LVES Vol Index A4C 20.1 mL/m2 JASON/BSA VTI 0.3 cm2/m2 GLUCOSE, POC Collection Time: 04/16/21  6:41 PM  
Result Value Ref Range Glucose (POC) 157 (H) 65 - 100 mg/dL Performed by Jerrilyn Denver GLUCOSE, POC Collection Time: 04/17/21  1:36 AM  
Result Value Ref Range Glucose (POC) 82 65 - 100 mg/dL Performed by Romaine Limon MAGNESIUM Collection Time: 04/17/21  5:13 AM  
Result Value Ref Range Magnesium 2.2 1.6 - 2.4 mg/dL HEPATIC FUNCTION PANEL Collection Time: 04/17/21  5:13 AM  
Result Value Ref Range Protein, total 4.6 (L) 6.4 - 8.2 g/dL Albumin 2.1 (L) 3.5 - 5.0 g/dL Globulin 2.5 2.0 - 4.0 g/dL A-G Ratio 0.8 (L) 1.1 - 2.2 Bilirubin, total 1.7 (H) 0.2 - 1.0 MG/DL Bilirubin, direct 1.2 (H) 0.0 - 0.2 MG/DL Alk. phosphatase 61 45 - 117 U/L  
 AST (SGOT) 6 (L) 15 - 37 U/L  
 ALT (SGPT) 7 (L) 12 - 78 U/L  
CBC WITH AUTOMATED DIFF  Collection Time: 04/17/21 5:13 AM  
Result Value Ref Range WBC 13.6 (H) 3.6 - 11.0 K/uL  
 RBC 2.58 (L) 3.80 - 5.20 M/uL HGB 7.6 (L) 11.5 - 16.0 g/dL HCT 22.5 (L) 35.0 - 47.0 % MCV 87.2 80.0 - 99.0 FL  
 MCH 29.5 26.0 - 34.0 PG  
 MCHC 33.8 30.0 - 36.5 g/dL  
 RDW 16.1 (H) 11.5 - 14.5 % PLATELET 912 (L) 066 - 400 K/uL MPV 11.2 8.9 - 12.9 FL  
 NRBC 0.2 (H) 0  WBC ABSOLUTE NRBC 0.03 (H) 0.00 - 0.01 K/uL NEUTROPHILS 82 (H) 32 - 75 % LYMPHOCYTES 4 (L) 12 - 49 % MONOCYTES 11 5 - 13 % EOSINOPHILS 2 0 - 7 % BASOPHILS 0 0 - 1 % IMMATURE GRANULOCYTES 1 (H) 0.0 - 0.5 % ABS. NEUTROPHILS 11.1 (H) 1.8 - 8.0 K/UL  
 ABS. LYMPHOCYTES 0.6 (L) 0.8 - 3.5 K/UL  
 ABS. MONOCYTES 1.4 (H) 0.0 - 1.0 K/UL  
 ABS. EOSINOPHILS 0.3 0.0 - 0.4 K/UL  
 ABS. BASOPHILS 0.0 0.0 - 0.1 K/UL  
 ABS. IMM. GRANS. 0.2 (H) 0.00 - 0.04 K/UL  
 DF AUTOMATED PROTHROMBIN TIME + INR Collection Time: 04/17/21  5:13 AM  
Result Value Ref Range INR 1.1 0.9 - 1.1 Prothrombin time 11.6 (H) 9.0 - 11.1 sec METABOLIC PANEL, BASIC Collection Time: 04/17/21  5:13 AM  
Result Value Ref Range Sodium 129 (L) 136 - 145 mmol/L Potassium 4.1 3.5 - 5.1 mmol/L Chloride 102 97 - 108 mmol/L  
 CO2 19 (L) 21 - 32 mmol/L Anion gap 8 5 - 15 mmol/L Glucose 169 (H) 65 - 100 mg/dL BUN 33 (H) 6 - 20 MG/DL Creatinine 2.19 (H) 0.55 - 1.02 MG/DL  
 BUN/Creatinine ratio 15 12 - 20 GFR est AA 28 (L) >60 ml/min/1.73m2 GFR est non-AA 23 (L) >60 ml/min/1.73m2 Calcium 7.9 (L) 8.5 - 10.1 MG/DL  
PHOSPHORUS Collection Time: 04/17/21  5:13 AM  
Result Value Ref Range Phosphorus 4.6 2.6 - 4.7 MG/DL FIBRINOGEN Collection Time: 04/17/21  5:13 AM  
Result Value Ref Range Fibrinogen 372 200 - 475 mg/dL GLUCOSE, POC Collection Time: 04/17/21  5:35 AM  
Result Value Ref Range Glucose (POC) 169 (H) 65 - 100 mg/dL Performed by Ashwini Mckeon Telemetry: SR 
 
 
Assessment:  
 
Active Problems: 
  Chest pain (11/23/2020) CHF (congestive heart failure) ([facility] (4/7/2021) NSTEMI (non-ST elevated myocardial infarction) ([facility] (4/7/2021) S/P cardiac cath (4/8/2021) Overview: 4/13/2021: s/p PCI/ADDIS to mid LAD x 1  
    4/8/2021: cardiac cath showed MVD. Mod/severe MR, elevated PA pressures DNR (do not resuscitate) discussion () Goals of care, counseling/discussion () Need for emotional support () Shortness of breath () Hypovolemic shock (HCC) () Cardiogenic shock (HCC) () Plan:  
 
NSTEMI: cardiogenic shock due to ischemic cardiomyopathy, mild to mod MR.  
s/p successful PCI/ADDIS to mid LAD 4/13/21 without need for impella support. Continue current meds. Repeat Echo ntoed.  
   
Acute on chronic systolic HF, with cardiogenic and hypovolemic shock:  
Continue pressor support to help maintain hemodynamic stability Right heart numbers noted. Normal CO and CI. MvO2 sat 65% 
  
Parox A-fib s/p AV node ablation   
Coreg held with cardiogenic shock on pressors Hold eliquis  
   
PARAG on Chronic kidney disease stage IV : baseline 2-2.1 Follow BMP Nephrology following  
  
Acute blood loss anemia:  
Has received 11 units PRBC's thus far- only one overnight 
FFP, and platelets S/p emergency vascular surgery. Remains critically ill.

## 2021-04-17 NOTE — PROGRESS NOTES
Critical Care Progress Note Leticia Faulkner MD 
  
  
  
Date of Service:  2021 NAME:  Chance Rabago :  1960 MRN:  [id number] Interval history / Subjective: Intubated, sedated On dobutamine, levophed +epi Assessment & Plan:  
 
NSTEMI: with multivessel disease, not a candidate for CABG per CTS. On dobutamine  for decompensated heart failure Management per cards, continue Elayne Luevano Sp  high risk PCI  got stent to LAD Official echo reading from  pending 
  
Right Groin Hematoma from Arterial Bleed sp exploration/evacuation and arterial ligation - Multiple PRBCs, FFP and platelets ordered - Fu labs - Multipressor support - Made DNR by HCS.    
  
  
Acute decompensated heart failure with cardiogenic shock. Dobutamine drip, levo+epi Continue diuresis as tolerated Closely monitor hemodynamics, has PA catheter - Go by A line for BP measurements. Repeat lactate today 
 
  
Paroxysmal AF Rate controlled. Apixaban on hold presently 
  
PARAG on CKD4. Cr slightly improved  Cardiorenal syndrome Nephrology following Na dropped to 129 today due to D10? Switch to D5 NS at 50 ml/hr 
  
Elevated LFTs - likely hepatic congestion DM2: Lantus; Lispro w meals; SSI HLD: Holding statin due to transaminitis F - Feeding:  start trickle feeds A - Analgesia: none S - Sedation: propofol T - DVT Prophylaxis: SCD's or Sequential Compression Device  
H - Head of Bed: > 30 Degrees U - Ulcer Prophylaxis: Pepcid (famotidine)  
G - Glycemic Control: Insulin S - Spontaneous Breathing Trial: no 
B - Bowel Regimen: None needed at this time I - Indwelling Catheter: 
            LUXXH: None Lines: right IJ cordis Drains: YOANA at the groin D - De-escalation of Antibiotics: N/A 
  
Multidisciplinary Rounds Completed:  NO 
  
ABCDEF Bundle/Checklist Completed: Yes 
  
SPECIAL EQUIPMENT None 
  
DISPOSITION Stay in ICU.   
 
 
 I personally spent   40   minutes of critical care time.  This is time spent at this critically ill patient's bedside actively involved in patient care as well as the coordination of care and discussions with the patient's family.  This does not include any procedural time which has been billed separately. Hospital Problems  Date Reviewed: 3/1/2021 Codes Class Noted POA Hypovolemic shock ([facility] [address] ICD-10-CM: R57.1 ICD-9-CM: 785.59  Unknown Unknown Cardiogenic shock ([facility] [address] ICD-10-CM: R57.0 ICD-9-CM: 785.51  Unknown Unknown DNR (do not resuscitate) discussion ICD-10-CM: Z71.89 ICD-9-CM: V65.49  Unknown Unknown Goals of care, counseling/discussion ICD-10-CM: Z71.89 ICD-9-CM: V65.49  Unknown Unknown Need for emotional support ICD-10-CM: R45.89 ICD-9-CM: 799.29  Unknown Unknown Shortness of breath ICD-10-CM: R06.02 
ICD-9-CM: 786.05  Unknown Unknown S/P cardiac cath ICD-10-CM: Z98.890 ICD-9-CM: V45.89  4/8/2021 Unknown Overview Addendum 4/13/2021  2:57 PM by Veronica Lazaro NP  
  4/13/2021: s/p PCI/ADDIS to mid LAD x 1  
4/8/2021: cardiac cath showed MVD. Mod/severe MR, elevated PA pressures CHF (congestive heart failure) ([facility]) ICD-10-CM: I50.9 ICD-9-CM: 428.0  4/7/2021 Unknown NSTEMI (non-ST elevated myocardial infarction) ([facility] [address] ICD-10-CM: I21.4 ICD-9-CM: 410.70  4/7/2021 Unknown Chest pain ICD-10-CM: R07.9 ICD-9-CM: 786.50  11/23/2020 Unknown Review of Systems:  
Review of systems not obtained due to patient factors. Vital Signs:  
 Last 24hrs VS reviewed since prior progress note. Most recent are: 
Visit Vitals BP (!) 80/45 Pulse 90 Temp (!) 100.5 °F (38.1 °C) Resp 14 Ht 5' 10\" (1.778 m) Wt 125 kg (275 lb 9.2 oz) SpO2 100% BMI 39.54 kg/m² Intake/Output Summary (Last 24 hours) at 4/17/2021 1100 Last data filed at 4/17/2021 1035 [facility] per 24 hour Intake 2778.8 ml Output 2965 ml Net -186.2 ml Physical Examination:  
 
 
     
Constitutional:  sedated ENT:  Oral mucous moist, oropharynx benign. Resp:  intubated, some crackles bilaterally. No wheezing/rhonchi/rales. No accessory muscle use CV:  Regular rhythm, normal rate, no murmurs, gallops, rubs GI:  Soft, non distended, non tender. normoactive bowel sounds, no hepatosplenomegaly Musculoskeletal:  No edema, warm, 2+ pulses throughout Neurologic:  sedated Psych:  sedated Skin:  Good turgor, no rashes or ulcers Hematologic/Lymphatic/Immunlogic:  No jaundice nor lymph node swelling :  deferred Eyes:  sedated, PERRL Data Review:  
 Review and/or order of clinical lab test 
 
 
Labs:  
 
Recent Labs 04/17/21 
0513 04/16/21 
1535 WBC 13.6* 12.6* HGB 7.6* 7.9*  
HCT 22.5* 22.9*  
* 110* Recent Labs 04/17/21 
6751 04/16/21 
2628 04/15/21 
0503 * 132* 136  
K 4.1 4.0 3.4*  
 105 106 CO2 19* 21 21 BUN 33* 34* 36* CREA 2.19* 2.22* 2.29* * 85 108* CA 7.9* 7.9* 8.0*  
MG 2.2 2.2 1.7 PHOS 4.6 4.6 4.6 Recent Labs 04/17/21 
0492 04/16/21 
9359 04/15/21 
0503 ALT 7* 11* 14  
AP 61 46 41* TBILI 1.7* 1.6* 1.5* TP 4.6* 4.8* 4.7* ALB 2.1* 2.5* 2.4*  
GLOB 2.5 2.3 2.3 Recent Labs 04/17/21 
4393 04/16/21 
2887 04/15/21 
0503 INR 1.1 1.1 1.2* PTP 11.6* 11.5* 12.6* No results for input(s): FE, TIBC, PSAT, FERR in the last 72 hours. Lab Results Component Value Date/Time Folate 8.5 03/14/2020 02:49 PM  
  
No results for input(s): PH, PCO2, PO2 in the last 72 hours. No results for input(s): CPK, CKNDX, TROIQ in the last 72 hours. No lab exists for component: CPKMB Lab Results Component Value Date/Time  Cholesterol, total 79 04/11/2021 04:25 AM  
 HDL Cholesterol 18 04/11/2021 04:25 AM  
 LDL, calculated 44.6 04/11/2021 04:25 AM  
 Triglyceride 82 04/11/2021 04:25 AM  
 CHOL/HDL Ratio 4.4 04/11/2021 04:25 AM  
 
Lab Results Component Value Date/Time Glucose (POC) 169 (H) 04/17/2021 05:35 AM  
 Glucose (POC) 82 04/17/2021 01:36 AM  
 Glucose (POC) 157 (H) 04/16/2021 06:41 PM  
 Glucose (POC) 169 (H) 04/16/2021 12:09 PM  
 Glucose (POC) 77 04/16/2021 12:05 PM  
 
Lab Results Component Value Date/Time Color YELLOW/STRAW 12/19/2020 04:48 PM  
 Appearance CLOUDY (A) 12/19/2020 04:48 PM  
 Specific gravity 1.013 12/19/2020 04:48 PM  
 pH (UA) 5.5 12/19/2020 04:48 PM  
 Protein 100 (A) 12/19/2020 04:48 PM  
 Glucose Negative 12/19/2020 04:48 PM  
 Ketone Negative 12/19/2020 04:48 PM  
 Bilirubin Negative 12/19/2020 04:48 PM  
 Urobilinogen 1.0 12/19/2020 04:48 PM  
 Nitrites Positive (A) 12/19/2020 04:48 PM  
 Leukocyte Esterase TRACE (A) 12/19/2020 04:48 PM  
 Epithelial cells FEW 12/19/2020 04:48 PM  
 Bacteria 4+ (A) 12/19/2020 04:48 PM  
 WBC 5-10 12/19/2020 04:48 PM  
 RBC 0-5 12/19/2020 04:48 PM  
 
 
 
Medications Reviewed:  
 
Current Facility-Administered Medications Medication Dose Route Frequency  dextrose 10% infusion  50 mL/hr IntraVENous CONTINUOUS  
 0.9% sodium chloride infusion 250 mL  250 mL IntraVENous PRN  
 insulin lispro (HUMALOG) injection   SubCUTAneous Q6H  
 dextrose (D50W) injection syrg 12.5-25 g  12.5-25 g IntraVENous PRN  
 fentaNYL (PF) 1,500 mcg/30 mL (50 mcg/mL) infusion  0-200 mcg/hr IntraVENous TITRATE  propofol (DIPRIVAN) 10 mg/mL infusion  0-50 mcg/kg/min IntraVENous TITRATE  chlorhexidine (ORAL CARE KIT) 0.12 % mouthwash 15 mL  15 mL Oral Q12H  clopidogreL (PLAVIX) tablet 75 mg  75 mg Oral DAILY  fentaNYL citrate (PF) injection 50 mcg  50 mcg IntraVENous Q2H PRN  
 fentaNYL citrate (PF) injection 25 mcg  25 mcg IntraVENous Q1H PRN  
 aspirin chewable tablet 81 mg  81 mg Per G Tube DAILY  EPINEPHrine (ADRENALIN) 10 mg in 0.9% sodium chloride 250 mL infusion  0-20 mcg/min IntraVENous TITRATE  
 NOREPINephrine (LEVOPHED) 32 mg in 5% dextrose 250 mL infusion  0.5-100 mcg/min IntraVENous TITRATE  famotidine (PF) (PEPCID) 20 mg in 0.9% sodium chloride 10 mL injection  20 mg IntraVENous Q24H  
 0.9% sodium chloride infusion 250 mL  250 mL IntraVENous PRN  
 sodium chloride (NS) flush 5-40 mL  5-40 mL IntraVENous Q8H  
 sodium chloride (NS) flush 5-40 mL  5-40 mL IntraVENous PRN  
 naloxone (NARCAN) injection 0.4 mg  0.4 mg IntraVENous PRN  
 vasopressin (VASOSTRICT) 20 Units in 0.9% sodium chloride 100 mL infusion  0-0.03 Units/min IntraVENous TITRATE  heparin (porcine) 2,000 Units in 0.9% sodium chloride 500 mL Irrigation    PRN  
 balsam peru-castor oiL (VENELEX) ointment   Topical BID  atorvastatin (LIPITOR) tablet 10 mg  10 mg Oral DAILY  [Held by provider] apixaban (ELIQUIS) tablet 5 mg  5 mg Oral Q12H  
 magic mouthwash cpd (without sucralfate)  5 mL Oral TID PRN  
 DOBUTamine (DOBUTREX) 500 mg/250 mL (2,000 mcg/mL) infusion  0-10 mcg/kg/min IntraVENous TITRATE  [Held by provider] carvediloL (COREG) tablet 3.125 mg  3.125 mg Oral BID WITH MEALS  
 alcohol 62% (NOZIN) nasal  1 Ampule  1 Ampule Topical Q12H  
 nitroglycerin (NITROSTAT) tablet 0.4 mg  0.4 mg SubLINGual PRN  
 sodium chloride (NS) flush 5-40 mL  5-40 mL IntraVENous Q8H  
 sodium chloride (NS) flush 5-40 mL  5-40 mL IntraVENous PRN  
 acetaminophen (TYLENOL) tablet 650 mg  650 mg Oral Q6H PRN Or  
 acetaminophen (TYLENOL) suppository 650 mg  650 mg Rectal Q6H PRN  polyethylene glycol (MIRALAX) packet 17 g  17 g Oral DAILY PRN  promethazine (PHENERGAN) tablet 12.5 mg  12.5 mg Oral Q6H PRN Or  
 ondansetron (ZOFRAN) injection 4 mg  4 mg IntraVENous Q6H PRN  
 glucose chewable tablet 16 g  4 Tab Oral PRN  
 glucagon (GLUCAGEN) injection 1 mg  1 mg IntraMUSCular PRN  
 
______________________________________________________________________ Willeen Maikel, MD

## 2021-04-17 NOTE — PROGRESS NOTES
Progress Note Assessment:  
Active Problems: 
  Chest pain (11/23/2020) CHF (congestive heart failure) ([facility] [address] (4/7/2021) NSTEMI (non-ST elevated myocardial infarction) ([facility] [address] (4/7/2021) S/P cardiac cath (4/8/2021) Overview: 4/13/2021: s/p PCI/ADDIS to mid LAD x 1  
    4/8/2021: cardiac cath showed MVD. Mod/severe MR, elevated PA pressures DNR (do not resuscitate) discussion () Goals of care, counseling/discussion () Need for emotional support () Shortness of breath () Hypovolemic shock (HCC) () Cardiogenic shock (HCC) () · ACUTE KIDNEY INJURY - likely due to ATN,CRS from suppressed EF from recent MI and NOE.  
· HYPONATREMIA- DUE TO CHF AND HYPOTONIC IVF D5W 
· Hypoglycemia · Hypokalemia · Large tense painful hematoma R groin/thigh after R CFA access for cardiac cath · Shock- cardiogenic + hemorrhagic · Edema- volume overload · Iron defi anemia · Hyponatremia due to chf 
· NSTEMI: s/p cardiac cath which showed MVD, mod/severe MR, and PHTN, cardiogenic shock due to ischemic cardiomyopathy- s/p PCI/ADDIS to mid LAD  on 4-13-21 
· afib - s/p AV node ablation · Anemia - CHRONIC- f/b DR. RUZI 
· hypertension · H/O LEFT BKA · ckd  3 LIKELY due to DM nephropathy and HTN nephrosclerosis- BL CR. 1.5-1.9 
· H/O Diabetic foot · Proteinuria likely due to DM nephropathy and HTN nephroscl- urine pr/cr 1.5 g/g · Vit d defi 
  
PLAN- 
· Doing poorly · Would consider dc d10 when possible - hypoNa has worsened and free water contributory. Suspect cardiac dz an issue as well. · Creat stable · Seems to be a poor candidate for RRT if/when deteriorates. Plan: As above Time spent with patient during dialysis no Subjective:  
 
 
Complaint:  intubated and cannot provide any hx Current Facility-Administered Medications Medication Dose Route Frequency  dextrose 10% infusion  50 mL/hr IntraVENous CONTINUOUS  
 0.9% sodium chloride infusion 250 mL  250 mL IntraVENous PRN  
 insulin lispro (HUMALOG) injection   SubCUTAneous Q6H  
 dextrose (D50W) injection syrg 12.5-25 g  12.5-25 g IntraVENous PRN  
 fentaNYL (PF) 1,500 mcg/30 mL (50 mcg/mL) infusion  0-200 mcg/hr IntraVENous TITRATE  propofol (DIPRIVAN) 10 mg/mL infusion  0-50 mcg/kg/min IntraVENous TITRATE  chlorhexidine (ORAL CARE KIT) 0.12 % mouthwash 15 mL  15 mL Oral Q12H  clopidogreL (PLAVIX) tablet 75 mg  75 mg Oral DAILY  fentaNYL citrate (PF) injection 50 mcg  50 mcg IntraVENous Q2H PRN  
 fentaNYL citrate (PF) injection 25 mcg  25 mcg IntraVENous Q1H PRN  
 aspirin chewable tablet 81 mg  81 mg Per G Tube DAILY  EPINEPHrine (ADRENALIN) 10 mg in 0.9% sodium chloride 250 mL infusion  0-20 mcg/min IntraVENous TITRATE  
 NOREPINephrine (LEVOPHED) 32 mg in 5% dextrose 250 mL infusion  0.5-100 mcg/min IntraVENous TITRATE  famotidine (PF) (PEPCID) 20 mg in 0.9% sodium chloride 10 mL injection  20 mg IntraVENous Q24H  
 0.9% sodium chloride infusion 250 mL  250 mL IntraVENous PRN  
 sodium chloride (NS) flush 5-40 mL  5-40 mL IntraVENous Q8H  
 sodium chloride (NS) flush 5-40 mL  5-40 mL IntraVENous PRN  
 naloxone (NARCAN) injection 0.4 mg  0.4 mg IntraVENous PRN  
 vasopressin (VASOSTRICT) 20 Units in 0.9% sodium chloride 100 mL infusion  0-0.03 Units/min IntraVENous TITRATE  heparin (porcine) 2,000 Units in 0.9% sodium chloride 500 mL Irrigation    PRN  
 balsam peru-castor oiL (VENELEX) ointment   Topical BID  atorvastatin (LIPITOR) tablet 10 mg  10 mg Oral DAILY  [Held by provider] apixaban (ELIQUIS) tablet 5 mg  5 mg Oral Q12H  
 magic mouthwash cpd (without sucralfate)  5 mL Oral TID PRN  
 DOBUTamine (DOBUTREX) 500 mg/250 mL (2,000 mcg/mL) infusion  0-10 mcg/kg/min IntraVENous TITRATE  [Held by provider] carvediloL (COREG) tablet 3.125 mg  3.125 mg Oral BID WITH MEALS  
 alcohol 62% (NOZIN) nasal  1 Ampule  1 Ampule Topical Q12H  nitroglycerin (NITROSTAT) tablet 0.4 mg  0.4 mg SubLINGual PRN  
 sodium chloride (NS) flush 5-40 mL  5-40 mL IntraVENous Q8H  
 sodium chloride (NS) flush 5-40 mL  5-40 mL IntraVENous PRN  
 acetaminophen (TYLENOL) tablet 650 mg  650 mg Oral Q6H PRN Or  
 acetaminophen (TYLENOL) suppository 650 mg  650 mg Rectal Q6H PRN  polyethylene glycol (MIRALAX) packet 17 g  17 g Oral DAILY PRN  promethazine (PHENERGAN) tablet 12.5 mg  12.5 mg Oral Q6H PRN Or  
 ondansetron (ZOFRAN) injection 4 mg  4 mg IntraVENous Q6H PRN  
 glucose chewable tablet 16 g  4 Tab Oral PRN  
 glucagon (GLUCAGEN) injection 1 mg  1 mg IntraMUSCular PRN Review of Systems Pertinent items are noted in HPI. Objective:  
 
Visit Vitals BP (!) 80/45 Pulse 90 Temp (!) 100.5 °F (38.1 °C) Resp 14 Ht 5' 10\" (1.778 m) Wt 125 kg (275 lb 9.2 oz) SpO2 100% BMI 39.54 kg/m² Temp (24hrs), Av.3 °F (37.4 °C), Min:97.9 °F (36.6 °C), Max:100.6 °F (38.1 °C) 
 
 
 07 -  1900 In: -  
Out: 510 [Urine:400; Drains:110] Physical Exam:  
 General [    ] healthy     [x] acutely ill [  x  ] chronically ill   [ x   ] critical    
 Skin  [x] pallor [    ] Eyes  [] EOMI/PERRL [    ]  
 ENT  [] clear/moist [    ] Neck  [x] no swelling  [    ] Pulm  [x] no distress [    ]  
 CV  [] RRR  [    ] ABD  [x] Soft  [    ]  
   [] Munroe  [    ]  
 MS  [2-3+LE] Edema  [    ] Neuro             [x] no response to tactile stim  [    ] Psyche           [x] unable to assess   [    ] Data Review:  
 
LABS:  
Recent Results (from the past 24 hour(s)) GLUCOSE, POC Collection Time: 21 11:53 AM  
Result Value Ref Range Glucose (POC) 72 65 - 100 mg/dL Performed by Jerrilyn Denver GLUCOSE, POC Collection Time: 21 12:01 PM  
Result Value Ref Range Glucose (POC) 67 65 - 100 mg/dL Performed by Jerrilyn Denver GLUCOSE, POC  Collection Time: 21 12:05 PM  
Result Value Ref Range Glucose (POC) 77 65 - 100 mg/dL Performed by [facility] GLUCOSE, POC Collection Time: 04/16/21 12:09 PM  
Result Value Ref Range Glucose (POC) 169 (H) 65 - 100 mg/dL Performed by [facility] CBC W/O DIFF Collection Time: 04/16/21  3:35 PM  
Result Value Ref Range WBC 12.6 (H) 3.6 - 11.0 K/uL  
 RBC 2.62 (L) 3.80 - 5.20 M/uL HGB 7.9 (L) 11.5 - 16.0 g/dL HCT 22.9 (L) 35.0 - 47.0 % MCV 87.4 80.0 - 99.0 FL  
 MCH 30.2 26.0 - 34.0 PG  
 MCHC 34.5 30.0 - 36.5 g/dL  
 RDW 16.1 (H) 11.5 - 14.5 % PLATELET 814 (L) 519 - 400 K/uL MPV 11.0 8.9 - 12.9 FL  
 NRBC 0.3 (H) 0  WBC ABSOLUTE NRBC 0.04 (H) 0.00 - 0.01 K/uL ECHO ADULT COMPLETE Collection Time: 04/16/21  5:28 PM  
Result Value Ref Range IVSd 1.41 (A) 0.60 - 0.90 cm LVIDd 4.50 3.90 - 5.30 cm LVIDs 3.86 cm  
 LVOT d 1.66 cm  
 LVPWd 1.21 (A) 0.60 - 0.90 cm LV Ejection Fraction MOD 4C 37 percent LV ED Vol A4C 76.25 mL  
 LV ES Vol A4C 48.10 mL  
 LVOT Cardiac Output 2.95 liter/minute LVOT Peak Gradient 3.91 mmHg LVOT Peak Gradient 3.45 mmHg Left Ventricular Outflow Tract Mean Gradient 1.85 mmHg LVOT SV 33.7 mL  
 LVOT Peak Velocity 92.83 cm/s LVOT Peak Velocity 98.86 cm/s LVOT VTI 15.56 cm  
 RVSP 48.22 mmHg Left Atrium Major Axis 4.48 cm  
 LA Area 4C 27.11 cm2 LA Vol 4C 105.84 (A) 22.00 - 52.00 mL Est. RA Pressure 10.00 mmHg Aortic Valve Area by Continuity of Peak Velocity 0.80 cm2 Aortic Valve Area by Continuity of Peak Velocity 0.85 cm2 Aortic Valve Area by Continuity of Peak Velocity 0.86 cm2 Aortic Valve Area by Continuity of Peak Velocity 0.81 cm2 Aortic Valve Area by Continuity of VTI 0.67 cm2 AoV PG 27.84 mmHg AoV PG 28.28 mmHg Aortic Valve Systolic Mean Gradient 46.52 mmHg Aortic Valve Systolic Peak Velocity 522.54 cm/s Aortic Valve Systolic Peak Velocity 290.92 cm/s AoV VTI 50.53 cm  
 LV E' Lateral Velocity 9.77 cm/s  LV E' Septal Velocity 7.95 cm/s  
 TR Max Velocity 428.40 cm/s Mitral Valve Pressure Half-time 74.37 ms  
 MVA (PHT) 2.96 cm2 Pulmonic Regurgitant End Max Velocity 219.15 cm/s Pulmonic Valve Systolic Peak Instantaneous Gradient 10.85 mmHg Pulmonic Valve Systolic Peak Instantaneous Gradient 10.61 mmHg Pulmonic Valve Systolic Mean Gradient 3.92 mmHg Pulmonic Valve Max Velocity 162.90 cm/s Pulmonic Valve Max Velocity 164.68 cm/s Pulmonic Valve Systolic Velocity Time Integral 18.21 cm Triscuspid Valve Regurgitation Peak Gradient 38.22 mmHg  
 TR Max Velocity 303.74 cm/s Ao Root D 2.89 cm  
 LV Mass .1 67.0 - 162.0 g  
 LV Mass AL Index 94.1 43.0 - 95.0 g/m2 Left Atrium Minor Axis 1.87 cm  
 LA Vol Index 44.25 16.00 - 28.00 ml/m2 LVED Vol Index A4C 31.9 mL/m2 LVES Vol Index A4C 20.1 mL/m2 JASON/BSA VTI 0.3 cm2/m2 GLUCOSE, POC Collection Time: 04/16/21  6:41 PM  
Result Value Ref Range Glucose (POC) 157 (H) 65 - 100 mg/dL Performed by Isabel Montejo GLUCOSE, POC Collection Time: 04/17/21  1:36 AM  
Result Value Ref Range Glucose (POC) 82 65 - 100 mg/dL Performed by Bernice Uribe MAGNESIUM Collection Time: 04/17/21  5:13 AM  
Result Value Ref Range Magnesium 2.2 1.6 - 2.4 mg/dL HEPATIC FUNCTION PANEL Collection Time: 04/17/21  5:13 AM  
Result Value Ref Range Protein, total 4.6 (L) 6.4 - 8.2 g/dL Albumin 2.1 (L) 3.5 - 5.0 g/dL Globulin 2.5 2.0 - 4.0 g/dL A-G Ratio 0.8 (L) 1.1 - 2.2 Bilirubin, total 1.7 (H) 0.2 - 1.0 MG/DL Bilirubin, direct 1.2 (H) 0.0 - 0.2 MG/DL Alk. phosphatase 61 45 - 117 U/L  
 AST (SGOT) 6 (L) 15 - 37 U/L  
 ALT (SGPT) 7 (L) 12 - 78 U/L  
CBC WITH AUTOMATED DIFF Collection Time: 04/17/21  5:13 AM  
Result Value Ref Range WBC 13.6 (H) 3.6 - 11.0 K/uL  
 RBC 2.58 (L) 3.80 - 5.20 M/uL HGB 7.6 (L) 11.5 - 16.0 g/dL HCT 22.5 (L) 35.0 - 47.0 %  MCV 87.2 80.0 - 99.0 FL  
 MCH 29.5 26.0 - 34.0 PG MCHC 33.8 30.0 - 36.5 g/dL  
 RDW 16.1 (H) 11.5 - 14.5 % PLATELET 158 (L) 005 - 400 K/uL MPV 11.2 8.9 - 12.9 FL  
 NRBC 0.2 (H) 0  WBC ABSOLUTE NRBC 0.03 (H) 0.00 - 0.01 K/uL NEUTROPHILS 82 (H) 32 - 75 % LYMPHOCYTES 4 (L) 12 - 49 % MONOCYTES 11 5 - 13 % EOSINOPHILS 2 0 - 7 % BASOPHILS 0 0 - 1 % IMMATURE GRANULOCYTES 1 (H) 0.0 - 0.5 % ABS. NEUTROPHILS 11.1 (H) 1.8 - 8.0 K/UL  
 ABS. LYMPHOCYTES 0.6 (L) 0.8 - 3.5 K/UL  
 ABS. MONOCYTES 1.4 (H) 0.0 - 1.0 K/UL  
 ABS. EOSINOPHILS 0.3 0.0 - 0.4 K/UL  
 ABS. BASOPHILS 0.0 0.0 - 0.1 K/UL  
 ABS. IMM. GRANS. 0.2 (H) 0.00 - 0.04 K/UL  
 DF AUTOMATED PROTHROMBIN TIME + INR Collection Time: 04/17/21  5:13 AM  
Result Value Ref Range INR 1.1 0.9 - 1.1 Prothrombin time 11.6 (H) 9.0 - 11.1 sec METABOLIC PANEL, BASIC Collection Time: 04/17/21  5:13 AM  
Result Value Ref Range Sodium 129 (L) 136 - 145 mmol/L Potassium 4.1 3.5 - 5.1 mmol/L Chloride 102 97 - 108 mmol/L  
 CO2 19 (L) 21 - 32 mmol/L Anion gap 8 5 - 15 mmol/L Glucose 169 (H) 65 - 100 mg/dL BUN 33 (H) 6 - 20 MG/DL Creatinine 2.19 (H) 0.55 - 1.02 MG/DL  
 BUN/Creatinine ratio 15 12 - 20 GFR est AA 28 (L) >60 ml/min/1.73m2 GFR est non-AA 23 (L) >60 ml/min/1.73m2 Calcium 7.9 (L) 8.5 - 10.1 MG/DL  
PHOSPHORUS Collection Time: 04/17/21  5:13 AM  
Result Value Ref Range Phosphorus 4.6 2.6 - 4.7 MG/DL FIBRINOGEN Collection Time: 04/17/21  5:13 AM  
Result Value Ref Range Fibrinogen 372 200 - 475 mg/dL GLUCOSE, POC Collection Time: 04/17/21  5:35 AM  
Result Value Ref Range Glucose (POC) 169 (H) 65 - 100 mg/dL Performed by Ashwini Mckeon Signed By: Hoang Do MD, ALBARO April 17, 2021  
 
 
 
 
 
 
  
 
 
[email or web address]

## 2021-04-17 NOTE — PROGRESS NOTES
04/17/21 0517 ABCDEF Bundle SBT Safety Screen Passed No  
SBT Screen Reason for Failure Vasopressor use (SAT Safety Screen failed)

## 2021-04-17 NOTE — PROGRESS NOTES
1900 - 0800 Verbal shift change report given to Rose Bellamy (oncoming nurse) by Everett Coronel (offgoing nurse). Report included the following information SBAR, Kardex, Intake/Output, MAR, Recent Results and Cardiac Rhythm Paced. Shift assessment complete, eyes do not open to any stimulus. Does not follow commands. VSS on dobutamine and epinephrine, will continue to monitor. Incontinence care done for large loose stool. Reassessment complete, no change. Incontinence care / bath complete,see flow sheet for details. Miguel Elizabeth / Heide Damme / R groin dressing etc... changed. Repositioned for comfort. End of Shift Note Bedside shift change report given to Everett Coronel (oncoming nurse) by Jordan Heard RN (offgoing nurse). Report included the following information SBAR, Kardex, Intake/Output, MAR, Recent Results and Cardiac Rhythm Paced Shift worked:  Debbie Mengcao Shift summary and any significant changes:  
  Weight gain Concerns for physician to address:  Swelling Zone phone for oncoming shift:   [phone] Activity: 
Activity Level: Bed Rest 
Number times ambulated in hallways past shift: 0 Number of times OOB to chair past shift: 0 Cardiac:  
Cardiac Monitoring: Yes     
Cardiac Rhythm: Paced Access:  
Current line(s): central line and midline Genitourinary:  
Urinary status: buckley Respiratory:  
O2 Device: (P) Ventilator Chronic home O2 use?: NO Incentive spirometer at bedside: NO 
  
GI: 
Last Bowel Movement Date: 04/12/21 Current diet:  DIET TUBE FEEDING Passing flatus: YES Tolerating current diet: YES 
% Diet Eaten: 100 % Pain Management:  
Patient states pain is manageable on current regimen: YES Skin: 
Valdemar Score: 9 Interventions: turn team and float heels Patient Safety: 
Fall Score: Total Score: 3 Interventions: gripper socks High Fall Risk: Yes Length of Stay: 
Expected LOS: 3d 21h Actual LOS: 11 Jordan Heard RN

## 2021-04-17 NOTE — PROGRESS NOTES
0700 Bedside and Verbal shift change report given to Alec Vincent (oncoming nurse) by Paige Thomas (offgoing nurse). Report included the following information SBAR, Kardex, Intake/Output, MAR, Recent Results and Cardiac Rhythm paced @ 90.  
1900 End of Shift Note Bedside shift change report given to Paige Thomas (oncoming nurse) by Vishal Hernandez RN (offgoing nurse). Report included the following information SBAR, Kardex, ED Summary, OR Summary, Procedure Summary, Intake/Output, MAR, Recent Results and Cardiac Rhythm paced Shift worked:  7a-7p Shift summary and any significant changes:  
  weaning down pressors Concerns for physician to address:   
  
[facility]   [phone] Activity: 
Activity Level: Bed Rest 
Number times ambulated in hallways past shift: 0 Number of times OOB to chair past shift: 0 Cardiac:  
Cardiac Monitoring: Yes     
Cardiac Rhythm: Paced Access:  
Current line(s): central line and midline Genitourinary:  
Urinary status: buckley Respiratory:  
O2 Device: Endotracheal tube, Ventilator Chronic home O2 use?: NO Incentive spirometer at bedside: NO 
  
GI: 
Last Bowel Movement Date: 04/12/21 Current diet:  DIET TUBE FEEDING Passing flatus: YES Tolerating current diet: NO 
% Diet Eaten: 100 % Pain Management:  
Patient states pain is manageable on current regimen: YES Skin: 
Valdemar Score: 9 Interventions: turn team, speciality bed, float heels, increase time out of bed, internal/external urinary devices and nutritional support Patient Safety: 
Fall Score: Total Score: 3 Interventions: bed/chair alarm, assistive device (walker, cane, etc), pt to call before getting OOB and sitter at bedside High Fall Risk: Yes Length of Stay: 
Expected LOS: 3d 21h Actual LOS: 10 Vishal Hernandez, GURINDER

## 2021-04-17 NOTE — PROGRESS NOTES
1900 - 0800 Bedside and Verbal shift change report given to Marya Contreras (oncoming nurse) by Matthew Lake (offgoing nurse). Report included the following information SBAR, Kardex, Procedure Summary, Intake/Output, Recent Results and Cardiac Rhythm Paced. Shift assessment complete, not interactive. Does not follow commands. VSS on levophed, epinephrine, dobutamine. Sedated on propofol,see MAR for details. Reassessment complete, no change. Bath given. Repositioned for comfort post reassessment. End of Shift Note Bedside shift change report given to Matthew Lake (oncoming nurse) by Pili Hameed RN (offgoing nurse). Report included the following information SBAR, Kardex, Intake/Output, MAR, Recent Results and Cardiac Rhythm Paced Shift worked:  Debbie Agilyx Shift summary and any significant changes:  
  None Concerns for physician to address:  None Zone phone for oncoming shift:   [phone] Activity: 
Activity Level: Bed Rest 
Number times ambulated in hallways past shift: 0 Number of times OOB to chair past shift: 0 Cardiac:  
Cardiac Monitoring: Yes     
Cardiac Rhythm: Paced Access:  
Current line(s): central line and midline Genitourinary:  
Urinary status: buckley Respiratory:  
O2 Device: Endotracheal tube, Ventilator Chronic home O2 use?: NO Incentive spirometer at bedside: NO 
  
GI: 
Last Bowel Movement Date: 04/12/21 Current diet:  No diet orders on file Passing flatus: YES Tolerating current diet: YES 
% Diet Eaten: 100 % Pain Management:  
Patient states pain is manageable on current regimen: YES Skin: 
Valdemar Score: 10 Interventions: float heels Patient Safety: 
Fall Score: Total Score: 3 Interventions: gripper socks High Fall Risk: Yes Length of Stay: 
Expected LOS: 3d 21h Actual LOS: 10 Pili Hameed RN

## 2021-04-18 NOTE — PROGRESS NOTES
Progress Note Assessment:  
Active Problems: 
  Chest pain (11/23/2020) CHF (congestive heart failure) ([facility]) (4/7/2021) NSTEMI (non-ST elevated myocardial infarction) ([facility]) (4/7/2021) S/P cardiac cath (4/8/2021) Overview: 4/13/2021: s/p PCI/ADDIS to mid LAD x 1  
    4/8/2021: cardiac cath showed MVD. Mod/severe MR, elevated PA pressures DNR (do not resuscitate) discussion () Goals of care, counseling/discussion () Need for emotional support () Shortness of breath () Hypovolemic shock (HCC) () Cardiogenic shock (HCC) () 1 PARAG/CKD 4  
2 CRS  
3 ANASARCA 
4 S/P LAD STENT , COMPLICATED  BY HEMATOMOA 
5 RESP FAILURE 
6 HYPONATREMIA 
7 ANEMIA PLAN 
1 START BUMEX GGT  
2 IF FAILED CAN CONSIDER RRT TO GET HER IN ASSIT WITH EXTUBATION 
3 NEP WILL FOLLOW Subjective:  
 
 
Complaint:  intubated and cannot provide any hx. On Dobutamine now. Needs fluid removal to get her into starling curve so she can pump better. Current Facility-Administered Medications Medication Dose Route Frequency  bumetanide (BUMEX) 0.25 mg/mL infusion  1 mg/hr IntraVENous CONTINUOUS  
 insulin lispro (HUMALOG) injection   SubCUTAneous Q6H  
 dextrose (D50W) injection syrg 12.5-25 g  12.5-25 g IntraVENous PRN  propofol (DIPRIVAN) 10 mg/mL infusion  0-50 mcg/kg/min IntraVENous TITRATE  chlorhexidine (ORAL CARE KIT) 0.12 % mouthwash 15 mL  15 mL Oral Q12H  clopidogreL (PLAVIX) tablet 75 mg  75 mg Oral DAILY  fentaNYL citrate (PF) injection 50 mcg  50 mcg IntraVENous Q2H PRN  
 fentaNYL citrate (PF) injection 25 mcg  25 mcg IntraVENous Q1H PRN  
 aspirin chewable tablet 81 mg  81 mg Per G Tube DAILY  EPINEPHrine (ADRENALIN) 10 mg in 0.9% sodium chloride 250 mL infusion  0-20 mcg/min IntraVENous TITRATE  famotidine (PF) (PEPCID) 20 mg in 0.9% sodium chloride 10 mL injection  20 mg IntraVENous Q24H  
 sodium chloride (NS) flush 5-40 mL  5-40 mL IntraVENous Q8H  
 sodium chloride (NS) flush 5-40 mL  5-40 mL IntraVENous PRN  
 naloxone (NARCAN) injection 0.4 mg  0.4 mg IntraVENous PRN  
 heparin (porcine) 2,000 Units in 0.9% sodium chloride 500 mL Irrigation    PRN  
 balsam peru-castor oiL (VENELEX) ointment   Topical BID  atorvastatin (LIPITOR) tablet 10 mg  10 mg Oral DAILY  [Held by provider] apixaban (ELIQUIS) tablet 5 mg  5 mg Oral Q12H  
 magic mouthwash cpd (without sucralfate)  5 mL Oral TID PRN  
 DOBUTamine (DOBUTREX) 500 mg/250 mL (2,000 mcg/mL) infusion  0-10 mcg/kg/min IntraVENous TITRATE  [Held by provider] carvediloL (COREG) tablet 3.125 mg  3.125 mg Oral BID WITH MEALS  
 alcohol 62% (NOZIN) nasal  1 Ampule  1 Ampule Topical Q12H  
 nitroglycerin (NITROSTAT) tablet 0.4 mg  0.4 mg SubLINGual PRN  
 sodium chloride (NS) flush 5-40 mL  5-40 mL IntraVENous Q8H  
 sodium chloride (NS) flush 5-40 mL  5-40 mL IntraVENous PRN  
 acetaminophen (TYLENOL) tablet 650 mg  650 mg Oral Q6H PRN Or  
 acetaminophen (TYLENOL) suppository 650 mg  650 mg Rectal Q6H PRN  polyethylene glycol (MIRALAX) packet 17 g  17 g Oral DAILY PRN  promethazine (PHENERGAN) tablet 12.5 mg  12.5 mg Oral Q6H PRN Or  
 ondansetron (ZOFRAN) injection 4 mg  4 mg IntraVENous Q6H PRN  
 glucose chewable tablet 16 g  4 Tab Oral PRN  
 glucagon (GLUCAGEN) injection 1 mg  1 mg IntraMUSCular PRN Review of Systems Pertinent items are noted in HPI. Objective:  
 
Visit Vitals BP (!) 80/45 Comment (BP 1 Location): a line Pulse 92 Temp 100.2 °F (37.9 °C) Resp 25 Ht 5' 10\" (1.778 m) Wt 127.4 kg (280 lb 13.9 oz) SpO2 100% BMI 40.30 kg/m² Temp (24hrs), Av.1 °F (37.8 °C), Min:99.5 °F (37.5 °C), Max:101.2 °F (38.4 °C) 
 
 
 07 -  1900 In: -  
Out: 970 [Urine:515; Drains:455] Physical Exam:  
 General [    ] healthy     [x] acutely ill [  x  ] chronically ill   [ x   ] critical    
 Skin  [x] pallor [ ] 
 Eyes  [] EOMI/PERRL [    ]  
 ENT  [] clear/moist [    ] Neck  [x] no swelling  [    ] Pulm  [x] no distress [    ]  
 CV  [] RRR  [    ] ABD  [x] Soft  [    ]  
   [] Munroe  [    ]  
 MS  [2-3+LE] Edema  [    ] Neuro             [x] no response to tactile stim  [    ] Psyche           [x] unable to assess   [    ] Data Review:  
 
LABS:  
Recent Results (from the past 24 hour(s)) GLUCOSE, POC Collection Time: 04/17/21  5:49 PM  
Result Value Ref Range Glucose (POC) 219 (H) 65 - 100 mg/dL Performed by Jerrilyn Denver LACTIC ACID Collection Time: 04/17/21  6:23 PM  
Result Value Ref Range Lactic acid 1.0 0.4 - 2.0 MMOL/L  
GLUCOSE, POC Collection Time: 04/17/21 11:40 PM  
Result Value Ref Range Glucose (POC) 170 (H) 65 - 100 mg/dL Performed by Romaine Limon CBC WITH AUTOMATED DIFF Collection Time: 04/18/21  5:54 AM  
Result Value Ref Range WBC 15.9 (H) 3.6 - 11.0 K/uL  
 RBC 2.49 (L) 3.80 - 5.20 M/uL HGB 7.4 (L) 11.5 - 16.0 g/dL HCT 22.8 (L) 35.0 - 47.0 % MCV 91.6 80.0 - 99.0 FL  
 MCH 29.7 26.0 - 34.0 PG  
 MCHC 32.5 30.0 - 36.5 g/dL  
 RDW 16.3 (H) 11.5 - 14.5 % PLATELET 621 488 - 480 K/uL MPV 11.2 8.9 - 12.9 FL  
 NRBC 0.6 (H) 0  WBC ABSOLUTE NRBC 0.10 (H) 0.00 - 0.01 K/uL NEUTROPHILS 94 (H) 32 - 75 % LYMPHOCYTES 1 (L) 12 - 49 % MONOCYTES 5 5 - 13 % EOSINOPHILS 0 0 - 7 % BASOPHILS 0 0 - 1 % IMMATURE GRANULOCYTES 0 0.0 - 0.5 % ABS. NEUTROPHILS 14.9 (H) 1.8 - 8.0 K/UL  
 ABS. LYMPHOCYTES 0.2 (L) 0.8 - 3.5 K/UL  
 ABS. MONOCYTES 0.8 0.0 - 1.0 K/UL  
 ABS. EOSINOPHILS 0.0 0.0 - 0.4 K/UL  
 ABS. BASOPHILS 0.0 0.0 - 0.1 K/UL  
 ABS. IMM. GRANS. 0.0 0.00 - 0.04 K/UL  
 DF MANUAL    
 RBC COMMENTS ANISOCYTOSIS 
1+ METABOLIC PANEL, BASIC Collection Time: 04/18/21  5:54 AM  
Result Value Ref Range Sodium 131 (L) 136 - 145 mmol/L Potassium 4.1 3.5 - 5.1 mmol/L  Chloride 104 97 - 108 mmol/L  
 CO2 18 (L) 21 - 32 mmol/L Anion gap 9 5 - 15 mmol/L Glucose 174 (H) 65 - 100 mg/dL BUN 34 (H) 6 - 20 MG/DL Creatinine 2.44 (H) 0.55 - 1.02 MG/DL  
 BUN/Creatinine ratio 14 12 - 20 GFR est AA 24 (L) >60 ml/min/1.73m2 GFR est non-AA 20 (L) >60 ml/min/1.73m2 Calcium 8.0 (L) 8.5 - 10.1 MG/DL FIBRINOGEN Collection Time: 04/18/21  5:54 AM  
Result Value Ref Range Fibrinogen 372 200 - 475 mg/dL HEPATIC FUNCTION PANEL Collection Time: 04/18/21  5:54 AM  
Result Value Ref Range Protein, total 4.5 (L) 6.4 - 8.2 g/dL Albumin 2.1 (L) 3.5 - 5.0 g/dL Globulin 2.4 2.0 - 4.0 g/dL A-G Ratio 0.9 (L) 1.1 - 2.2 Bilirubin, total 1.9 (H) 0.2 - 1.0 MG/DL Bilirubin, direct 1.5 (H) 0.0 - 0.2 MG/DL Alk. phosphatase 64 45 - 117 U/L  
 AST (SGOT) 6 (L) 15 - 37 U/L  
 ALT (SGPT) <6 (L) 12 - 78 U/L  
MAGNESIUM Collection Time: 04/18/21  5:54 AM  
Result Value Ref Range Magnesium 2.1 1.6 - 2.4 mg/dL PHOSPHORUS Collection Time: 04/18/21  5:54 AM  
Result Value Ref Range Phosphorus 4.8 (H) 2.6 - 4.7 MG/DL PROTHROMBIN TIME + INR Collection Time: 04/18/21  5:54 AM  
Result Value Ref Range INR 1.1 0.9 - 1.1 Prothrombin time 11.9 (H) 9.0 - 11.1 sec GLUCOSE, POC Collection Time: 04/18/21  6:06 AM  
Result Value Ref Range Glucose (POC) 171 (H) 65 - 100 mg/dL Performed by Min Velasquez GLUCOSE, POC Collection Time: 04/18/21 12:28 PM  
Result Value Ref Range Glucose (POC) 229 (H) 65 - 100 mg/dL Performed by Edwin Hammond Signed By: Segundo Ellison MD, FASN April 18, 2021  
 
 
 
 
 
 
  
 
 
[email or web address]

## 2021-04-18 NOTE — PROGRESS NOTES
4/18/2021 11:05 AM 
 
Admit Date: 4/7/2021 Admit Diagnosis: Chest pain [R07.9];NSTEMI (non-ST elevated myocardial infarction) ([facility]) [I21.4];[facility] [I50.9] Subjective:  
 
Reyna Nguyen remains critically ill. Low grade fever overnight. Now afebrile. Remains on vent and pressors dependant. Visit Vitals BP (!) 80/45 Comment (BP 1 Location): a line Pulse 91 Temp 100.1 °F (37.8 °C) Resp 21 Ht 5' 10\" (1.778 m) Wt 280 lb 13.9 oz (127.4 kg) SpO2 100% BMI 40.30 kg/m² Current Facility-Administered Medications Medication Dose Route Frequency  furosemide (LASIX) injection 40 mg  40 mg IntraVENous BID  insulin lispro (HUMALOG) injection   SubCUTAneous Q6H  
 dextrose (D50W) injection syrg 12.5-25 g  12.5-25 g IntraVENous PRN  propofol (DIPRIVAN) 10 mg/mL infusion  0-50 mcg/kg/min IntraVENous TITRATE  chlorhexidine (ORAL CARE KIT) 0.12 % mouthwash 15 mL  15 mL Oral Q12H  clopidogreL (PLAVIX) tablet 75 mg  75 mg Oral DAILY  fentaNYL citrate (PF) injection 50 mcg  50 mcg IntraVENous Q2H PRN  
 fentaNYL citrate (PF) injection 25 mcg  25 mcg IntraVENous Q1H PRN  
 aspirin chewable tablet 81 mg  81 mg Per G Tube DAILY  EPINEPHrine (ADRENALIN) 10 mg in 0.9% sodium chloride 250 mL infusion  0-20 mcg/min IntraVENous TITRATE  famotidine (PF) (PEPCID) 20 mg in 0.9% sodium chloride 10 mL injection  20 mg IntraVENous Q24H  
 sodium chloride (NS) flush 5-40 mL  5-40 mL IntraVENous Q8H  
 sodium chloride (NS) flush 5-40 mL  5-40 mL IntraVENous PRN  
 naloxone (NARCAN) injection 0.4 mg  0.4 mg IntraVENous PRN  
 heparin (porcine) 2,000 Units in 0.9% sodium chloride 500 mL Irrigation    PRN  
 balsam peru-castor oiL (VENELEX) ointment   Topical BID  atorvastatin (LIPITOR) tablet 10 mg  10 mg Oral DAILY  [Held by provider] apixaban (ELIQUIS) tablet 5 mg  5 mg Oral Q12H  
 magic mouthwash cpd (without sucralfate)  5 mL Oral TID PRN  
 DOBUTamine (DOBUTREX) 500 mg/250 mL (2,000 mcg/mL) infusion  0-10 mcg/kg/min IntraVENous TITRATE  [Held by provider] carvediloL (COREG) tablet 3.125 mg  3.125 mg Oral BID WITH MEALS  
 alcohol 62% (NOZIN) nasal  1 Ampule  1 Ampule Topical Q12H  
 nitroglycerin (NITROSTAT) tablet 0.4 mg  0.4 mg SubLINGual PRN  
 sodium chloride (NS) flush 5-40 mL  5-40 mL IntraVENous Q8H  
 sodium chloride (NS) flush 5-40 mL  5-40 mL IntraVENous PRN  
 acetaminophen (TYLENOL) tablet 650 mg  650 mg Oral Q6H PRN Or  
 acetaminophen (TYLENOL) suppository 650 mg  650 mg Rectal Q6H PRN  polyethylene glycol (MIRALAX) packet 17 g  17 g Oral DAILY PRN  promethazine (PHENERGAN) tablet 12.5 mg  12.5 mg Oral Q6H PRN Or  
 ondansetron (ZOFRAN) injection 4 mg  4 mg IntraVENous Q6H PRN  
 glucose chewable tablet 16 g  4 Tab Oral PRN  
 glucagon (GLUCAGEN) injection 1 mg  1 mg IntraMUSCular PRN Objective:  
  
Visit Vitals BP (!) 80/45 Pulse 91 Temp 100.1 °F (37.8 °C) Resp 21 Ht 5' 10\" (1.778 m) Wt 280 lb 13.9 oz (127.4 kg) SpO2 100% BMI 40.30 kg/m² Physical Exam: On vent. Abdomen: soft, non-tender. Extremities: left bka, right groin hematoma. Heart: regular rate and rhythm, S1, S2 normal, no murmur, click, rub or gallop Lungs: bilateral air entry, on vent. Neurologic: sedated Data Review:  
Labs:   
Recent Results (from the past 24 hour(s)) GLUCOSE, POC Collection Time: 04/17/21 12:06 PM  
Result Value Ref Range Glucose (POC) 220 (H) 65 - 100 mg/dL Performed by Nikole Nixon GLUCOSE, POC Collection Time: 04/17/21  5:49 PM  
Result Value Ref Range Glucose (POC) 219 (H) 65 - 100 mg/dL Performed by Nikole Nixon LACTIC ACID Collection Time: 04/17/21  6:23 PM  
Result Value Ref Range Lactic acid 1.0 0.4 - 2.0 MMOL/L  
GLUCOSE, POC Collection Time: 04/17/21 11:40 PM  
Result Value Ref Range Glucose (POC) 170 (H) 65 - 100 mg/dL Performed by Campbell Lawler CBC WITH AUTOMATED DIFF Collection Time: 04/18/21  5:54 AM  
Result Value Ref Range WBC 15.9 (H) 3.6 - 11.0 K/uL  
 RBC 2.49 (L) 3.80 - 5.20 M/uL HGB 7.4 (L) 11.5 - 16.0 g/dL HCT 22.8 (L) 35.0 - 47.0 % MCV 91.6 80.0 - 99.0 FL  
 MCH 29.7 26.0 - 34.0 PG  
 MCHC 32.5 30.0 - 36.5 g/dL  
 RDW 16.3 (H) 11.5 - 14.5 % PLATELET 204 588 - 694 K/uL MPV 11.2 8.9 - 12.9 FL  
 NRBC 0.6 (H) 0  WBC ABSOLUTE NRBC 0.10 (H) 0.00 - 0.01 K/uL NEUTROPHILS 94 (H) 32 - 75 % LYMPHOCYTES 1 (L) 12 - 49 % MONOCYTES 5 5 - 13 % EOSINOPHILS 0 0 - 7 % BASOPHILS 0 0 - 1 % IMMATURE GRANULOCYTES 0 0.0 - 0.5 % ABS. NEUTROPHILS 14.9 (H) 1.8 - 8.0 K/UL  
 ABS. LYMPHOCYTES 0.2 (L) 0.8 - 3.5 K/UL  
 ABS. MONOCYTES 0.8 0.0 - 1.0 K/UL  
 ABS. EOSINOPHILS 0.0 0.0 - 0.4 K/UL  
 ABS. BASOPHILS 0.0 0.0 - 0.1 K/UL  
 ABS. IMM. GRANS. 0.0 0.00 - 0.04 K/UL  
 DF MANUAL    
 RBC COMMENTS ANISOCYTOSIS 
1+ METABOLIC PANEL, BASIC Collection Time: 04/18/21  5:54 AM  
Result Value Ref Range Sodium 131 (L) 136 - 145 mmol/L Potassium 4.1 3.5 - 5.1 mmol/L Chloride 104 97 - 108 mmol/L  
 CO2 18 (L) 21 - 32 mmol/L Anion gap 9 5 - 15 mmol/L Glucose 174 (H) 65 - 100 mg/dL BUN 34 (H) 6 - 20 MG/DL Creatinine 2.44 (H) 0.55 - 1.02 MG/DL  
 BUN/Creatinine ratio 14 12 - 20 GFR est AA 24 (L) >60 ml/min/1.73m2 GFR est non-AA 20 (L) >60 ml/min/1.73m2 Calcium 8.0 (L) 8.5 - 10.1 MG/DL FIBRINOGEN Collection Time: 04/18/21  5:54 AM  
Result Value Ref Range Fibrinogen 372 200 - 475 mg/dL HEPATIC FUNCTION PANEL Collection Time: 04/18/21  5:54 AM  
Result Value Ref Range Protein, total 4.5 (L) 6.4 - 8.2 g/dL Albumin 2.1 (L) 3.5 - 5.0 g/dL Globulin 2.4 2.0 - 4.0 g/dL A-G Ratio 0.9 (L) 1.1 - 2.2 Bilirubin, total 1.9 (H) 0.2 - 1.0 MG/DL Bilirubin, direct 1.5 (H) 0.0 - 0.2 MG/DL Alk.  phosphatase 64 45 - 117 U/L  
 AST (SGOT) 6 (L) 15 - 37 U/L ALT (SGPT) <6 (L) 12 - 78 U/L  
MAGNESIUM Collection Time: 04/18/21  5:54 AM  
Result Value Ref Range Magnesium 2.1 1.6 - 2.4 mg/dL PHOSPHORUS Collection Time: 04/18/21  5:54 AM  
Result Value Ref Range Phosphorus 4.8 (H) 2.6 - 4.7 MG/DL PROTHROMBIN TIME + INR Collection Time: 04/18/21  5:54 AM  
Result Value Ref Range INR 1.1 0.9 - 1.1 Prothrombin time 11.9 (H) 9.0 - 11.1 sec GLUCOSE, POC Collection Time: 04/18/21  6:06 AM  
Result Value Ref Range Glucose (POC) 171 (H) 65 - 100 mg/dL Performed by Gera Napoles Telemetry: paced. Assessment:  
 
Active Problems: 
  Chest pain (11/23/2020) CHF (congestive heart failure) ([facility] (4/7/2021) NSTEMI (non-ST elevated myocardial infarction) ([facility] (4/7/2021) S/P cardiac cath (4/8/2021) Overview: 4/13/2021: s/p PCI/ADDIS to mid LAD x 1  
    4/8/2021: cardiac cath showed MVD. Mod/severe MR, elevated PA pressures DNR (do not resuscitate) discussion () Goals of care, counseling/discussion () Need for emotional support () Shortness of breath () Hypovolemic shock (HCC) () Cardiogenic shock (HCC) () Plan:  
 
NSTEMI: cardiogenic shock due to ischemic cardiomyopathy, mild to mod MR. Hypovolemic shock:  
s/p successful PCI/ADDIS to mid LAD 4/13/21 without need for impella support. Continue current meds. Repeat Echo ntoed. Right heart cath number noted. [facility]. Dc swan due to concern of line associated infection. Continue dobutamine. Wean off pressor as tolerated.  
  
Parox A-fib s/p AV node ablation   
Coreg held with cardiogenic shock on pressors Hold eliquis  
   
PARAG on Chronic kidney disease stage IV : baseline 2-2.1 Follow BMP Nephrology following  
  
Acute blood loss anemia:  
Has received 11 units PRBC's thus far- only one overnight 
FFP, and platelets S/p emergency vascular surgery.  
  
Remains critically ill.

## 2021-04-18 NOTE — PROGRESS NOTES
04/18/21 [id number] ABCDEF Bundle SBT Safety Screen Passed No  
SBT Screen Reason for Failure Vasopressor use

## 2021-04-18 NOTE — PROGRESS NOTES
Vascular Surgery 
--not much change from yesterday 
--groin looks the same 
--Hg appears stable 
--continuing support

## 2021-04-18 NOTE — PROGRESS NOTES
Critical Care Progress Note Adelia Severin, MD 
  
  
  
Date of Service:  2021 NAME:  Chirag Zazueta :  1960 MRN:  [id number] Interval history / Subjective: Intubated, sedated On dobutamine and epi Off levophed Urine output is decreasing Assessment & Plan:  
 
NSTEMI: with multivessel disease, not a candidate for CABG per CTS. On dobutamine  for decompensated heart failure Management per cards Sp  high risk PCI  got stent to LAD · Echo from  :LV: Estimated LVEF is 25 - 30%. Normal cavity size. Mild concentric hypertrophy. Severely reduced systolic function. · LA: Dilated left atrium. · RV: Mildly dilated right ventricle. Reduced systolic function. Pacing wire present · MV: Mitral valve leaflet calcification. Mild mitral annular calcification. Moderate mitral valve regurgitation is present. · TV: Mild to moderate tricuspid valve regurgitation is present. · PA: Severe pulmonary hypertension. Pulmonary arterial systolic pressure is 69 mmHg. 
  
  
  
Right Groin Hematoma from Arterial Bleed sp exploration/evacuation and arterial ligation - Multiple PRBCs, FFP and platelets ordered - Fu labs - Multipressor support - Made DNR by HCS.    
  
  
Acute decompensated heart failure with cardiogenic shock. Dobutamine drip, levo+epi Continue diuresis as tolerated Closely monitor hemodynamics, has PA catheter - Go by A line for BP measurements. Repeat lactate today 
 
  
Paroxysmal AF Rate controlled. Apixaban on hold presently 
  
PARAG on CKD4. Cr slightly improved  Cardiorenal syndrome Nephrology following Is considered poor candidate for CRRT Urine output is dropping Will start lasix 
  
Elevated LFTs - likely hepatic congestion DM2: Lantus; Lispro w meals; SSI HLD: Holding statin due to transaminitis F - Feeding:   trickle feeds A - Analgesia: none Tato Freeman - Sedation: propofol T - DVT Prophylaxis: SCD's or Sequential Compression Device  
H - Head of Bed: > 30 Degrees U - Ulcer Prophylaxis: Pepcid (famotidine)  
G - Glycemic Control: Insulin S - Spontaneous Breathing Trial: no 
B - Bowel Regimen: None needed at this time I - Indwelling Catheter: 
            RCFQA: None Lines: right IJ cordis Drains: YOANA at the groin D - De-escalation of Antibiotics: N/A 
  
Multidisciplinary Rounds Completed:  NO 
  
ABCDEF Bundle/Checklist Completed: Yes 
  
SPECIAL EQUIPMENT None 
  
DISPOSITION Stay in ICU.   
 
 
 I personally spent   40   minutes of critical care time.  This is time spent at this critically ill patient's bedside actively involved in patient care as well as the coordination of care and discussions with the patient's family.  This does not include any procedural time which has been billed separately. Hospital Problems  Date Reviewed: 3/1/2021 Codes Class Noted POA Hypovolemic shock ([address] ICD-10-CM: R57.1 ICD-9-CM: 785.59  Unknown Unknown Cardiogenic shock ([address] ICD-10-CM: R57.0 ICD-9-CM: 785.51  Unknown Unknown DNR (do not resuscitate) discussion ICD-10-CM: Z71.89 ICD-9-CM: V65.49  Unknown Unknown Goals of care, counseling/discussion ICD-10-CM: Z71.89 ICD-9-CM: V65.49  Unknown Unknown Need for emotional support ICD-10-CM: R45.89 ICD-9-CM: 799.29  Unknown Unknown Shortness of breath ICD-10-CM: R06.02 
ICD-9-CM: 786.05  Unknown Unknown S/P cardiac cath ICD-10-CM: Z98.890 ICD-9-CM: V45.89  4/8/2021 Unknown Overview Addendum 4/13/2021  2:57 PM by Nash Vora NP  
  4/13/2021: s/p PCI/ADDIS to mid LAD x 1  
4/8/2021: cardiac cath showed MVD. Mod/severe MR, elevated PA pressures CHF (congestive heart failure) (HCC) ICD-10-CM: I50.9 ICD-9-CM: 428.0  4/7/2021 Unknown NSTEMI (non-ST elevated myocardial infarction) ([facility] ICD-10-CM: I21.4 ICD-9-CM: 410.70  4/7/2021 Unknown Chest pain ICD-10-CM: R07.9 ICD-9-CM: 786.50  11/23/2020 Unknown Review of Systems:  
Review of systems not obtained due to patient factors. Vital Signs:  
 Last 24hrs VS reviewed since prior progress note. Most recent are: 
Visit Vitals BP (!) 80/45 Pulse 91 Temp 99.8 °F (37.7 °C) Resp 21 Ht 5' 10\" (1.778 m) Wt 127.4 kg (280 lb 13.9 oz) SpO2 100% BMI 40.30 kg/m² Intake/Output Summary (Last 24 hours) at 4/18/2021 7969 Last data filed at 4/18/2021 0900 Gross per 24 hour Intake 2321.98 ml Output 2185 ml Net 136.98 ml Physical Examination:  
 
 
     
Constitutional:  sedated ENT:  Oral mucous moist, oropharynx benign. Resp:  intubated, some crackles bilaterally. No wheezing/rhonchi/rales. No accessory muscle use CV:  Regular rhythm, normal rate, no murmurs, gallops, rubs GI:  Soft, non distended, non tender. normoactive bowel sounds, no hepatosplenomegaly Musculoskeletal:  No edema, warm, 2+ pulses throughout Neurologic:  sedated Psych:  sedated Skin:  Good turgor, no rashes or ulcers Hematologic/Lymphatic/Immunlogic:  No jaundice nor lymph node swelling :  deferred Eyes:  sedated, PERRL Data Review:  
 Review and/or order of clinical lab test 
 
 
Labs:  
 
Recent Labs 04/18/21 
9009 04/17/21 
0563 WBC 15.9* 13.6* HGB 7.4* 7.6* HCT 22.8* 22.5*  
 125* Recent Labs 04/18/21 
4271 04/17/21 
2129 04/16/21 
0156 * 129* 132* K 4.1 4.1 4.0  
 102 105 CO2 18* 19* 21 BUN 34* 33* 34* CREA 2.44* 2.19* 2.22* * 169* 85  
CA 8.0* 7.9* 7.9*  
MG 2.1 2.2 2.2 PHOS 4.8* 4.6 4.6 Recent Labs 04/18/21 
6661 04/17/21 
8962 04/16/21 
7521 ALT <6* 7* 11* AP 64 61 46 TBILI 1.9* 1.7* 1.6* TP 4.5* 4.6* 4.8* ALB 2.1* 2.1* 2.5*  
GLOB 2.4 2.5 2.3 Recent Labs 04/18/21 
9562 04/17/21 
6336 04/16/21 
9302 INR 1.1 1.1 1.1 PTP 11.9* 11.6* 11.5* No results for input(s): FE, TIBC, PSAT, FERR in the last 72 hours. Lab Results Component Value Date/Time Folate 8.5 03/14/2020 02:49 PM  
  
No results for input(s): PH, PCO2, PO2 in the last 72 hours. No results for input(s): CPK, CKNDX, TROIQ in the last 72 hours. No lab exists for component: CPKMB Lab Results Component Value Date/Time Cholesterol, total 79 04/11/2021 04:25 AM  
 HDL Cholesterol 18 04/11/2021 04:25 AM  
 LDL, calculated 44.6 04/11/2021 04:25 AM  
 Triglyceride 82 04/11/2021 04:25 AM  
 CHOL/HDL Ratio 4.4 04/11/2021 04:25 AM  
 
Lab Results Component Value Date/Time Glucose (POC) 171 (H) 04/18/2021 06:06 AM  
 Glucose (POC) 170 (H) 04/17/2021 11:40 PM  
 Glucose (POC) 219 (H) 04/17/2021 05:49 PM  
 Glucose (POC) 220 (H) 04/17/2021 12:06 PM  
 Glucose (POC) 169 (H) 04/17/2021 05:35 AM  
 
Lab Results Component Value Date/Time Color YELLOW/STRAW 12/19/2020 04:48 PM  
 Appearance CLOUDY (A) 12/19/2020 04:48 PM  
 Specific gravity 1.013 12/19/2020 04:48 PM  
 pH (UA) 5.5 12/19/2020 04:48 PM  
 Protein 100 (A) 12/19/2020 04:48 PM  
 Glucose Negative 12/19/2020 04:48 PM  
 Ketone Negative 12/19/2020 04:48 PM  
 Bilirubin Negative 12/19/2020 04:48 PM  
 Urobilinogen 1.0 12/19/2020 04:48 PM  
 Nitrites Positive (A) 12/19/2020 04:48 PM  
 Leukocyte Esterase TRACE (A) 12/19/2020 04:48 PM  
 Epithelial cells FEW 12/19/2020 04:48 PM  
 Bacteria 4+ (A) 12/19/2020 04:48 PM  
 WBC 5-10 12/19/2020 04:48 PM  
 RBC 0-5 12/19/2020 04:48 PM  
 
 
 
Medications Reviewed:  
 
Current Facility-Administered Medications Medication Dose Route Frequency  furosemide (LASIX) injection 40 mg  40 mg IntraVENous BID  insulin lispro (HUMALOG) injection   SubCUTAneous Q6H  
 dextrose (D50W) injection syrg 12.5-25 g  12.5-25 g IntraVENous PRN  propofol (DIPRIVAN) 10 mg/mL infusion  0-50 mcg/kg/min IntraVENous TITRATE  chlorhexidine (ORAL CARE KIT) 0.12 % mouthwash 15 mL  15 mL Oral Q12H  clopidogreL (PLAVIX) tablet 75 mg  75 mg Oral DAILY  fentaNYL citrate (PF) injection 50 mcg  50 mcg IntraVENous Q2H PRN  
 fentaNYL citrate (PF) injection 25 mcg  25 mcg IntraVENous Q1H PRN  
 aspirin chewable tablet 81 mg  81 mg Per G Tube DAILY  EPINEPHrine (ADRENALIN) 10 mg in 0.9% sodium chloride 250 mL infusion  0-20 mcg/min IntraVENous TITRATE  famotidine (PF) (PEPCID) 20 mg in 0.9% sodium chloride 10 mL injection  20 mg IntraVENous Q24H  
 sodium chloride (NS) flush 5-40 mL  5-40 mL IntraVENous Q8H  
 sodium chloride (NS) flush 5-40 mL  5-40 mL IntraVENous PRN  
 naloxone (NARCAN) injection 0.4 mg  0.4 mg IntraVENous PRN  
 heparin (porcine) 2,000 Units in 0.9% sodium chloride 500 mL Irrigation    PRN  
 balsam peru-castor oiL (VENELEX) ointment   Topical BID  atorvastatin (LIPITOR) tablet 10 mg  10 mg Oral DAILY  [Held by provider] apixaban (ELIQUIS) tablet 5 mg  5 mg Oral Q12H  
 magic mouthwash cpd (without sucralfate)  5 mL Oral TID PRN  
 DOBUTamine (DOBUTREX) 500 mg/250 mL (2,000 mcg/mL) infusion  0-10 mcg/kg/min IntraVENous TITRATE  [Held by provider] carvediloL (COREG) tablet 3.125 mg  3.125 mg Oral BID WITH MEALS  
 alcohol 62% (NOZIN) nasal  1 Ampule  1 Ampule Topical Q12H  
 nitroglycerin (NITROSTAT) tablet 0.4 mg  0.4 mg SubLINGual PRN  
 sodium chloride (NS) flush 5-40 mL  5-40 mL IntraVENous Q8H  
 sodium chloride (NS) flush 5-40 mL  5-40 mL IntraVENous PRN  
 acetaminophen (TYLENOL) tablet 650 mg  650 mg Oral Q6H PRN Or  
 acetaminophen (TYLENOL) suppository 650 mg  650 mg Rectal Q6H PRN  polyethylene glycol (MIRALAX) packet 17 g  17 g Oral DAILY PRN  promethazine (PHENERGAN) tablet 12.5 mg  12.5 mg Oral Q6H PRN  Or  
 ondansetron (ZOFRAN) injection 4 mg  4 mg IntraVENous Q6H PRN  
 glucose chewable tablet 16 g  4 Tab Oral PRN  
 glucagon (GLUCAGEN) injection 1 mg  1 mg IntraMUSCular PRN  
 ______________________________________________________________________ Lynwood Vitaliy, MD

## 2021-04-19 NOTE — PROGRESS NOTES
Pharmacy Automatic Renal Dosing Protocol - Antimicrobials Indication for Antimicrobials: Sepsis Current Regimen of Each Antimicrobial: 
Meropenem 500 mg IV q8h (Start Date ; Day # 1) Vancomycin 2000 mg IV Loading dose (Start Date ; Day # 1) Previous Antimicrobial Therapy: 
Vancomycin 2000 mg IV x 1 (Start Date ; Day # 1) Meropenem 500 mg IV q8h (Start Date ; Day # 1) Goal Level: VANCOMYCIN TROUGH GOAL RANGE Vancomycin Trough: 15 - 20 mcg/mL  (AUC: 400 - 600 mg/hr/Liter/day) Date Dose & Interval Measured (mcg/mL) Extrapolated (mcg/mL) Date & time of next level: Plan for trough before 2nd maintenance dose  @ 1300 Significant Cultures:  
 Sputum - pending Labs: 
Recent Labs 21 
6449 21 
4260 21 
1279 CREA 2.48* 2.44* 2.19* BUN 34* 34* 33* WBC 20.4* 15.9* 13.6* Temp (24hrs), Av.9 °F (37.7 °C), Min:99.3 °F (37.4 °C), Max:100.5 °F (38.1 °C) Is the Patient on Dialysis? No 
 
Creatinine Clearance (mL/min):  
CrCl (Actual Body Weight): 48.3 CrCl (Adjusted Body Weight): 34.8 CrCl (Ideal Body Weight): 25.8 Impression/Plan:  
Initiate meropenem 500 mg q8h Initiate vancomycin 2 g loading dose followed by 750 mg q24h for an estimated trough of 15.7,  Antimicrobial stop date TBD Pharmacy will follow daily and adjust medications as appropriate for renal function and/or serum levels. Thank you, Benny Osgood

## 2021-04-19 NOTE — INTERDISCIPLINARY ROUNDS
Critical care interdisciplinary rounds held on 04/19/2021. Following members present, Pharmacy, Diabetes Treatment, Case Management, Physical Therapy, Palliative Care and Nutrition. Led by BILLIE Roberts RN and Dr. Shine Campbell. Plan of care discussed. See clinical pathway for plan of care and interventions and desired outcomes.

## 2021-04-19 NOTE — PROGRESS NOTES
Hemodynamically much better Hgb seems to have settled out in 7 range No change in groin exam 
Minimal YOANA outpt Will get drains out this week sometime Following QOD

## 2021-04-19 NOTE — PROGRESS NOTES
0800 - Patient on 40 of propofol, 10mcg dobutamine, and 1mg bumex. 1200 - Sputum culture and urine culture sent per order, as well as procalcitonin. Buckley changed out for new buckley with urine culture. Titrating sedation down per order from Dr. Danna Martínez. 1600 - Spoke with Dr. Patrick Garcia about the swan for  The patient. He would like to discontinue the swan tomorrow after the PICC team places a picc line. 1700 - Propofol titrated off. 
 
1800 -  
1 unit of PRBCs started per order. Patient's respiratory rate increased to 30s and increased peak pressures. Instructed by Dr. Danna Martínez to turn back on sedation at 20 mcg. End of Shift Note Bedside shift change report given to Stephy Cueto RN (oncoming nurse) by Frank King RN (offgoing nurse). Report included the following information SBAR, Kardex, Intake/Output, MAR, Recent Results and Med Rec Status Shift worked:  7a-7p Shift summary and any significant changes:  
  none Concerns for physician to address:  n/a 
  
Zone phone for oncoming shift:   none Activity: 
Activity Level: Bed Rest 
Number times ambulated in hallways past shift: 0 Number of times OOB to chair past shift: 0 Cardiac:  
Cardiac Monitoring: Yes     
Cardiac Rhythm: Paced Access:  
Current line(s): central line and midline Genitourinary:  
Urinary status: buckley Respiratory:  
O2 Device: Endotracheal tube Chronic home O2 use?: NO Incentive spirometer at bedside: NO 
  
GI: 
Last Bowel Movement Date: 04/19/21 Current diet:  DIET TUBE FEEDING Passing flatus: NO Tolerating current diet: NO 
% Diet Eaten: 100 % Pain Management:  
Patient states pain is manageable on current regimen: N/A Skin: 
Valdemar Score: 10 Interventions: turn team, float heels and internal/external urinary devices Patient Safety: 
Fall Score: Total Score: 2 Interventions: bed/chair alarm High Fall Risk: Yes Length of Stay: 
Expected LOS: 3d 21h Actual LOS: 12 
 
 Fernando Claudio RN

## 2021-04-19 NOTE — PROGRESS NOTES
[facility] Name: Seema Farah : 1960 MRN: [id number] Date: 2021 Assessment:  
Brief patient summary: 
 
 
Leukocytosis Febrile yesterday. Will get pan cultures, start empiric abx (h/o ESBL). Will attempt to remove unnecessary lines. Follow up procal.  
 
NSTEMI: with multivessel disease, not a candidate for CABG per CTS. On dobutamine  for decompensated heart failure Management per cards Sp  high risk PCI  got stent to LAD · Echo from  :LV: Estimated LVEF is 25 - 30%. Normal cavity size. Mild concentric hypertrophy. Severely reduced systolic function. · LA: Dilated left atrium. · RV: Mildly dilated right ventricle. Reduced systolic function. Pacing wire present · MV: Mitral valve leaflet calcification. Mild mitral annular calcification. Moderate mitral valve regurgitation is present. · TV: Mild to moderate tricuspid valve regurgitation is present. · PA: Severe pulmonary hypertension. Pulmonary arterial systolic pressure is 69 mmHg. 
  
 respiratory failure Wean sedation today and follow up blood gas. Will SBT once sedation weaned. 
  
Right Groin Hematoma from Arterial Bleed sp exploration/evacuation and arterial ligation - Multiple PRBCs, FFP and platelets ordered - Fu labs - Multipressor support - Made DNR by HCS.    
  
  
Acute decompensated heart failure with cardiogenic shock. Dobutamine drip, levo+epi Continue diuresis as tolerated Closely monitor hemodynamics, has PA catheter - Go by A line for BP measurements. Repeat lactate today 
  
  
Paroxysmal AF Rate controlled. Apixaban on hold presently 
  
PARAG on CKD4. Cr slightly improved  Cardiorenal syndrome Nephrology following Is considered poor candidate for CRRT Urine output is dropping 
bumex ggt per renal 
  
Elevated LFTs - likely hepatic congestion DM2: Lantus; Lispro w meals; SSI HLD: Holding statin due to transaminitis  
  
F - Feeding:   trickle feeds A - Analgesia: none S - Sedation: propofol T - DVT Prophylaxis: SCD's or Sequential Compression Device  
H - Head of Bed: > 30 Degrees U - Ulcer Prophylaxis: Pepcid (famotidine)  
G - Glycemic Control: Insulin S - Spontaneous Breathing Trial: no 
B - Bowel Regimen: None needed at this time I - Indwelling Catheter: 
            MCOQR: None Lines: right IJ cordis Drains: YOANA at the groin D - De-escalation of Antibiotics: N/A 
  
Multidisciplinary Rounds Completed:  NO 
  
ABCDEF Bundle/Checklist Completed: Yes 
  
SPECIAL EQUIPMENT None 
  
DISPOSITION Stay in ICU.   
Past Medical History:  
 
 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 She also has no past medical history of Adverse effect of anesthesia, Difficult intubation, Malignant hyperthermia due to anesthesia, or Pseudocholinesterase deficiency.  
 
Past Surgical History:  
 
 has a past surgical history that includes pr laminectomy,cervical (1994); pr gastric bypass,obese<150cm emma-en-y (7/08); pr anesth,knee area surgery ([address]); hx cervical fusion (1994); hx dilation and curettage (1992); hx carpal tunnel release; hx mastectomy (1998); ir insert tunl cvc w port over 5 years; pr trabeculoplasty by laser surgery; pr needle biopsy liver,w othr proc (8.21.07); us guide asp ovarian cyst abd approach (8.21.07); hx afib ablation; hx pacemaker (11/17/2020); hx mohs procedure (1995); pr abdomen surgery proc unlisted; hx orthopaedic (Right); hx mastectomy (Left); hx pacemaker; hx breast reconstruction (Bilateral); pr relocation of skin pocket for pacemaker (N/A, 4/24/2020); pr rmvl transvns pm eltrd 1 lead sys atr/ventr (N/A, 4/24/2020); pr rmvl transvns pm eltrd 1 lead sys atr/ventr (N/A, 4/27/2020); ir insert tunl cvc w/o port over 5 yr (5/4/2020); ir remove tunl cvad w/o port / pump (6/26/2020); ir insert tunl cvc w/o port over 5 yr (9/8/2020); hx amputation foot (Left, 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 Home Medications:  
 
Prior to Admission medications Medication Sig Start Date End Date Taking? Authorizing Provider  
cholecalciferol (Vitamin D3) (1000 Units /25 mcg) tablet Take 1,000 Units by mouth daily. Yes Provider, Historical  
vitamin a-vitamin c-vit e-min (OCUVITE) tablet Take 1 Tab by mouth daily. Yes Provider, Historical  
cyanocobalamin (Vitamin B-12) 500 mcg tablet Take 500 mcg by mouth daily. Yes Provider, Historical  
pantoprazole (PROTONIX) 40 mg tablet Take 1 Tab by mouth Before breakfast and dinner. 12/7/20  Yes Corinne Brook, MD  
sertraline (ZOLOFT) 100 mg tablet Take 100 mg by mouth daily. 10/30/20  Yes Provider, Historical  
sertraline (ZOLOFT) 25 mg tablet Take 25 mg by mouth daily. Take with 100 mg tablet every morning 10/6/20  Yes Provider, Historical  
busPIRone (BUSPAR) 10 mg tablet TAKE ONE TABLET BY MOUTH TWICE A DAY 5/24/19   Vic Ornelas MD  
 
 
Allergies/Social/Family History: Allergies Allergen Reactions  Avapro [Irbesartan] Unable to Obtain  Bactrim [Sulfamethoxazole-Trimethoprim] Other (comments) Sore mouth  Contrast Agent [Iodine] Itching  [address]  Morphine Nausea and Vomiting and Swelling  Penicillins Hives Did not tolerate cefepime 3/2020  Pravachol [Pravastatin] Nausea Only  Seafood [Shellfish Containing Products] Hives  Singulair [Montelukast] Other (comments)  
  palpitations  Ace Inhibitors Cough  Amlodipine Swelling  Captopril Cough  Carvedilol Diarrhea Social History Tobacco Use  Smoking status: Never Smoker  Smokeless tobacco: Never Used Substance Use Topics  Alcohol use: Not Currently Family History Problem Relation Age of Onset  Cancer Mother  Heart Disease Mother  Alcohol abuse Father  Diabetes Brother  Hypertension Brother Objective:  
Vital Signs: 
Visit Vitals BP (!) 80/45 Comment (BP 1 Location): a line Pulse 92 Temp 99.4 °F (37.4 °C) Resp 23 Ht 5' 10\" (1.778 m) Wt 128.5 kg (283 lb 4.7 oz) SpO2 100% BMI 40.65 kg/m² O2 Flow Rate (L/min): 2 l/min O2 Device: Endotracheal tube, Ventilator Temp (24hrs), Av.9 °F (37.7 °C), Min:99.3 °F (37.4 °C), Max:100.5 °F (38.1 °C) CVP (mmHg): 2 mmHg (21 0800) Intake/Output:  
 
Intake/Output Summary (Last 24 hours) at 2021 0910 Last data filed at 2021 0800 Gross per 24 hour Intake 1960.3 ml Output 1800 ml Net 160.3 ml Physical Exam: 
Physical Exam 
Constitutional:   
   Comments: Intubated sedated HENT:  
   Mouth/Throat:  
   Mouth: Mucous membranes are dry. Pharynx: No oropharyngeal exudate. Cardiovascular:  
   Rate and Rhythm: Normal rate. Pulmonary:  
   Effort: No respiratory distress. Comments: Coarse Abdominal:  
   General: There is distension. Palpations: Abdomen is soft. Skin: 
   General: Skin is warm. I have examined the patient on this day 2021 and the above documented exam is accurate including the components that have been copied forward LABS AND  DATA: Personally reviewed Recent Labs  
  04/19/21 
7829 04/18/21 
6473 WBC 20.4* 15.9* HGB 7.1* 7.4* HCT 21.6* 22.8*  
 150 Recent Labs 04/19/21 
2580 04/18/21 
2807 * 131*  
K 3.7 4.1  104 CO2 20* 18* BUN 34* 34* CREA 2.48* 2.44* * 174* CA 7.9* 8.0*  
MG 2.0 2.1 PHOS 4.5 4.8* Recent Labs 04/19/21 
7741 04/18/21 
1969 AP 71 64  
TP 4.3* 4.5* ALB 1.8* 2.1*  
GLOB 2.5 2.4 Recent Labs 04/19/21 
2072 04/18/21 
3374 INR 1.2* 1.1 PTP 12.6* 11.9* No results for input(s): PHI, PCO2I, PO2I, FIO2I in the last 72 hours. No results for input(s): CPK, CKMB, TROIQ, BNPP in the last 72 hours. Hemodynamics:  
PAP: PAP Systolic: 26 (76/03/24 1473) CO: CO (l/min): 4.7 l/min (04/19/21 0800) Wedge:   CI: CI (l/min/m2): 2 l/min/m2 (04/19/21 0800) CVP:  CVP (mmHg): 2 mmHg (04/13/21 0800) SVR:    
  PVR:    
 
Ventilator Settings: 
Mode Rate Tidal Volume Pressure FiO2 PEEP Assist control   450 ml    40 % 5 cm H20 Peak airway pressure: 29 cm H2O Minute ventilation: 10.7 l/min MEDS: Reviewed Chest X-Ray: CXR Results  (Last 48 hours) None Multidisciplinary Rounds Completed:  Yes DISPOSITION Stay in ICU CRITICAL CARE CONSULTANT NOTE I had a in-person encounter with Priya Sarabia, reviewed and interpreted patient data including events, labs, images, vital signs, I/O's, and examined patient. I have discussed the case and the plan and management of the patient's care with the consulting services, the bedside nurses and the respiratory therapist.   
 
NOTE OF PERSONAL INVOLVEMENT IN CARE This patient is at high risk for sudden and clinically significant deterioration, which requires the highest level of preparedness to intervene urgently.  I participated in the decision-making and personally managed or directed the management of the following life and organ supporting interventions that required my frequent assessment to treat or prevent imminent deterioration. I personally spent 50 minutes of critical care time. This is time spent at patient's bedside actively involved in patient care as well as the coordination of care and discussions with the patient's family. This does not include any procedural time which has been billed separately. Vina Kawasaki, MD 
Pulmonary/CCM [phone] 
[phone] 4/19/2021

## 2021-04-19 NOTE — PROGRESS NOTES
Palliative Medicine [phone] Reviewed the chart, and talked to nursing, Ms Nicki Knapp is still intubated, but pressors are being weaned, and making some small progress. Tentative plan to pull swan tomorrow, hopefully pull drains this week, weaning pressor support, hgb is more stable. Remain available for support to , but no meetings planned at this point Waiting to see what the suggestions are from the medical team 
Noted that there is talk of Dialysis vs hospice  is looking to us for recommendations Melida Mendiola, NP

## 2021-04-19 NOTE — PROGRESS NOTES
Comprehensive Nutrition Assessment Type and Reason for Visit: Gary Bedoya Nutrition Recommendations/Plan:  
Advance TF rate 10mL q 8h as tolerated to Goal Rate of Nepro @ 30mL/h + Prosource BID + 50mL flush q 6h (provides 1416kcals/88gPro/116gCHO/723mL) Nutrition Assessment:   Chart reviewed, case discussed during CCU rounds. Pt remains intubated and sedated with propofol @ 21.9mL/h, which provides 578 kcals daily. Trophic TF started over the weekend and pt appears to be tolerating this well. She is off of pressors, although ART line MAP's remain in the low 60's. Per discussion with Dr. Shiva capone to advance TF rate as tolerated. Pt is being diuresed for significant fluid overload but urine OP is minimal.  Nephrology is following but per notes pt is a poor long term RRT candidate, no RRT started as of yet. Palliative team continues to follow. TF at goal as above + propofol will meet 85% kcal and 100% protein needs (underfeeding 2' severe anasarca). Estimated Daily Nutrient Needs: 
Energy (kcal): [facility] [address] ([address] [place] [address]); Weight Used for Energy Requirements: Current Protein (g): 83-104g (0.8-1gPro/kg); Weight Used for Protein Requirements: Current Fluid (ml/day): 1900mL; Method Used for Fluid Requirements: 1 ml/kcal 
 
 
Nutrition Related Findings:  Meds: diuril, pepcid, humalog, merrem, vancomycin; Drips: propofol, bumex, dobutamine. Edema: anasarca, +3 pitting-all extremities. BM-flexiseal   
 
Wounds:   
Stage I, Surgical incision(stage 1-sacrum per RN) Current Nutrition Therapies: 
Current Tube Feeding (TF) Orders: · Feeding Route: Orogastric · Formula: Nepro · Schedule:Continuous · Regimen: 10mL/h · Goal TF & Flush Orders Provides: 1416kcals/88gPro/116gCHO/723mL Anthropometric Measures: 
· Height:  5' 10\" (177.8 cm) · Current Body Wt:  128.5 kg (283 lb 4.7 oz) · Ideal Body Wt:  150 lbs:  153.1 % · BMI Category:  Obese class 3 (BMI 40.0 or greater) Nutrition Diagnosis:  
· Inadequate protein-energy intake related to impaired respiratory function as evidenced by NPO or clear liquid status due to medical condition Previous dx resolving. Nutrition Interventions:  
Food and/or Nutrient Delivery: Modify tube feeding Nutrition Education and Counseling: No recommendations at this time Coordination of Nutrition Care: Continue to monitor while inpatient, Interdisciplinary rounds Goals: Pt will tolerate TF at goal rate with residuals <500mL in 2-4 days. Nutrition Monitoring and Evaluation:  
Behavioral-Environmental Outcomes: None identified Food/Nutrient Intake Outcomes: Enteral nutrition intake/tolerance, IVF intake Physical Signs/Symptoms Outcomes: Biochemical data, Nutrition focused physical findings, Skin, Weight, GI status, Fluid status or edema, Hemodynamic status Discharge Planning: Too soon to determine Electronically signed by Kali Redman RD, [address]  on 4/19/2021 at 1:43 PM 
 
Contact: [id number]

## 2021-04-19 NOTE — PROGRESS NOTES
[address]  [phone] Cardiology Progress Note 4/19/2021 1020 AM  
 
Admit Date: 4/7/2021 Admit Diagnosis:  
Chest pain [R07.9] NSTEMI (non-ST elevated myocardial infarction) ([facility] [address] [I21.4] CHF (congestive heart failure) ([facility] [address] [I50.9] Subjective:  
 
Ester England is intubated and sedated this am. Discussed case at length with RN again this am.  
 
S/p PCI/ADDIS to mid LAD 4/13/2021. S/p right groin exploration/control of bleeding/evacuation of hematoma/placement of drains on 4/13/2021. Appears hgb has stabilized over weekend, YOANA's are still draining some. The patient is on dobutamine at 10 mcg and Bumex gtt. Also on propofol for sedation. PA pressures 25/16,  systolic, CI 2.0, PS49 08% at time of rounding. Hold eliquis, continue ASA and plavix. Hold coreg. Appreciate vascular surgery involvement Visit Vitals BP (!) 80/45 Comment (BP 1 Location): a line Pulse 91 Temp 98.5 °F (36.9 °C) Resp 26 Ht 5' 10\" (1.778 m) Wt 283 lb 4.7 oz (128.5 kg) SpO2 100% BMI 40.65 kg/m² Current Facility-Administered Medications Medication Dose Route Frequency  chlorothiazide (DIURIL) 250 mg in sterile water (preservative free) 9 mL injection  250 mg IntraVENous Q12H  
 meropenem (MERREM) 500 mg in 0.9% sodium chloride (MBP/ADV) 50 mL MBP  0.5 g IntraVENous Q8H  
 sterile water (preservative free) injection  [START ON 4/20/2021] vancomycin (VANCOCIN) 750 mg in 0.9% sodium chloride 250 mL (VIAL-MATE)  750 mg IntraVENous Q24H  Vancomycin - Pharmacy to Dose   Other Rx Dosing/Monitoring  bumetanide (BUMEX) 0.25 mg/mL infusion  1 mg/hr IntraVENous CONTINUOUS  
 insulin lispro (HUMALOG) injection   SubCUTAneous Q6H  
 dextrose (D50W) injection syrg 12.5-25 g  12.5-25 g IntraVENous PRN  propofol (DIPRIVAN) 10 mg/mL infusion  0-50 mcg/kg/min IntraVENous TITRATE  chlorhexidine (ORAL CARE KIT) 0.12 % mouthwash 15 mL  15 mL Oral Q12H  clopidogreL (PLAVIX) tablet 75 mg  75 mg Oral DAILY  fentaNYL citrate (PF) injection 50 mcg  50 mcg IntraVENous Q2H PRN  
 fentaNYL citrate (PF) injection 25 mcg  25 mcg IntraVENous Q1H PRN  
 aspirin chewable tablet 81 mg  81 mg Per G Tube DAILY  EPINEPHrine (ADRENALIN) 10 mg in 0.9% sodium chloride 250 mL infusion  0-20 mcg/min IntraVENous TITRATE  famotidine (PF) (PEPCID) 20 mg in 0.9% sodium chloride 10 mL injection  20 mg IntraVENous Q24H  
 sodium chloride (NS) flush 5-40 mL  5-40 mL IntraVENous Q8H  
 sodium chloride (NS) flush 5-40 mL  5-40 mL IntraVENous PRN  
 naloxone (NARCAN) injection 0.4 mg  0.4 mg IntraVENous PRN  
 heparin (porcine) 2,000 Units in 0.9% sodium chloride 500 mL Irrigation    PRN  
 balsam peru-castor oiL (VENELEX) ointment   Topical BID  atorvastatin (LIPITOR) tablet 10 mg  10 mg Oral DAILY  [Held by provider] apixaban (ELIQUIS) tablet 5 mg  5 mg Oral Q12H  
 magic mouthwash cpd (without sucralfate)  5 mL Oral TID PRN  
 DOBUTamine (DOBUTREX) 500 mg/250 mL (2,000 mcg/mL) infusion  0-10 mcg/kg/min IntraVENous TITRATE  [Held by provider] carvediloL (COREG) tablet 3.125 mg  3.125 mg Oral BID WITH MEALS  
 alcohol 62% (NOZIN) nasal  1 Ampule  1 Ampule Topical Q12H  
 nitroglycerin (NITROSTAT) tablet 0.4 mg  0.4 mg SubLINGual PRN  
 sodium chloride (NS) flush 5-40 mL  5-40 mL IntraVENous Q8H  
 sodium chloride (NS) flush 5-40 mL  5-40 mL IntraVENous PRN  
 acetaminophen (TYLENOL) tablet 650 mg  650 mg Oral Q6H PRN Or  
 acetaminophen (TYLENOL) suppository 650 mg  650 mg Rectal Q6H PRN  polyethylene glycol (MIRALAX) packet 17 g  17 g Oral DAILY PRN  promethazine (PHENERGAN) tablet 12.5 mg  12.5 mg Oral Q6H PRN  Or  
 ondansetron (ZOFRAN) injection 4 mg  4 mg IntraVENous Q6H PRN  
 glucose chewable tablet 16 g  4 Tab Oral PRN  
 glucagon (GLUCAGEN) injection 1 mg  1 mg IntraMUSCular PRN  
 Objective:  
  
Physical Exam: 
Gen: chronically ill appearing  female, ashen/yellow appearance intubated and sedated Abdomen: soft, non-tender. Bowel sounds hypoactive, obese Extremities: left leg BKA, right leg trace edema, discolored, large soft hematoma to groin. Two YOANA drains in place draining. Dorsalis pulse palpable. Heart: regular rate and rhythm, no murmur, S3/JVD Lungs: diminished and coarse throughout Neck: supple, symmetrical, trachea midline Neurologic: sedated and intubated Data Review:  
Recent Labs 04/19/21 
2615 04/18/21 
1650 04/17/21 
6845 WBC 20.4* 15.9* 13.6* HGB 7.1* 7.4* 7.6* HCT 21.6* 22.8* 22.5*  
 150 125* Recent Labs 04/19/21 
6193 04/18/21 
4651 04/17/21 
3665 * 131* 129*  
K 3.7 4.1 4.1  104 102 CO2 20* 18* 19* * 174* 169* BUN 34* 34* 33* CREA 2.48* 2.44* 2.19* CA 7.9* 8.0* 7.9*  
MG 2.0 2.1 2.2 PHOS 4.5 4.8* 4.6 ALB 1.8* 2.1* 2.1* TBILI 1.4* 1.9* 1.7* ALT 6* <6* 7* INR 1.2* 1.1 1.1 No results for input(s): TROIQ, CPK, CKMB in the last 72 hours. Intake/Output Summary (Last 24 hours) at 4/19/2021 1554 Last data filed at 4/19/2021 1500 Gross per 24 hour Intake 2706.55 ml Output 2010 ml Net 696.55 ml Cardiographics 
  Telemetry: Paced ECG: Paced, no acute changes Echocardiogram: 3/1/2021 · LV: Estimated LVEF is 50 - 55%. Visually measured ejection fraction. Normal cavity size. Upper normal wall thickness. Low normal systolic function. · MV: Mitral annular calcification. Mild to moderate mitral valve regurgitation is present. · TV: Mild to moderate tricuspid valve regurgitation is present. · PA: Pulmonary arterial systolic pressure is 44 mmHg. · Image quality for this study was technically difficult. New ECHO 4/7/2021:  
· LV: Estimated LVEF is 20 - 25%. Normal cavity size. Moderate concentric hypertrophy. Severely reduced systolic function.  
· RV: Dilated right ventricle. Mildly reduced systolic function. Pacing wire present · MV: Mitral valve non-specific thickening. Mild mitral annular calcification. Moderate to severe mitral valve regurgitation is present. · TV: Mild tricuspid valve regurgitation is present. · PA: Moderate pulmonary hypertension. Pulmonary arterial systolic pressure is 47 mmHg. · RA: Mildly dilated right atrium. Repeat echo 4/11/2021 on dobutamine: 
· LV: Estimated LVEF is 25 - 30%. Normal cavity size. Moderate concentric hypertrophy. Moderate-to-severely reduced systolic function. Pacemaker. . 
· LA: Moderately dilated left atrium. · RV: Dilated right ventricle. Borderline low systolic function. Pacing wire present · RA: Moderately dilated right atrium. · AV: Mild aortic valve focal thickening present. · MV: Mitral valve non-specific thickening. Mild mitral annular calcification. Mild to moderate mitral valve regurgitation is present. · TV: Mild tricuspid valve regurgitation is present. · PA: Moderate pulmonary hypertension. Pulmonary arterial systolic pressure is 52 mmHg. ECHO 4/16/2021: 
· LV: Estimated LVEF is 25 - 30%. Normal cavity size. Mild concentric hypertrophy. Severely reduced systolic function. · LA: Dilated left atrium. · RV: Mildly dilated right ventricle. Reduced systolic function. Pacing wire present · MV: Mitral valve leaflet calcification. Mild mitral annular calcification. Moderate mitral valve regurgitation is present. · TV: Mild to moderate tricuspid valve regurgitation is present. · PA: Severe pulmonary hypertension. Pulmonary arterial systolic pressure is 69 mmHg. CXRAY: \"Slightly improved patchy bilateral airspace disease\". Assessment:  
 
Active Problems: 
  Chest pain (11/23/2020) CHF (congestive heart failure) ([address] (4/7/2021) NSTEMI (non-ST elevated myocardial infarction) ([address] (4/7/2021) S/P cardiac cath (4/8/2021)     Overview: 4/13/2021: s/p PCI/ADDIS to mid LAD x 1  
 4/8/2021: cardiac cath showed MVD. Mod/severe MR, elevated PA pressures DNR (do not resuscitate) discussion () Goals of care, counseling/discussion () Need for emotional support () Shortness of breath () Hypovolemic shock (HCC) () Cardiogenic shock (HCC) () Plan:  
Lj Rodríguez is a 63 y.o. female with a PMH significant for chronic PAF, right arm lymphadema (s/p lymphectomy), bilateral mastectomy for breast CA, HTN. HLD, neuropathy, left BKA, SSS, DM II, CKD, Asthma, A-flutter, long term use of OAC, endocarditis  admitted for Chest pain [R07.9] NSTEMI (non-ST elevated myocardial infarction) ([facility] [address] [I21.4] CHF (congestive heart failure) ([facility] [address]    
 
 
NSTEMI: s/p cardiac cath which showed MVD, mod/severe MR, and PHTN, cardiogenic shock due to ischemic cardiomyopathy: 
Troponin peaked at 55, trended down to 28, no further trending necessary. Cardiac index improved from 1.1- Mixed venous oxygen saturation is 59% and cardiac index 2.0 on rounds today. PA pressures in the 20s over high teens. Kia Holiness has been in 1 week tomorrow, noted WBC's are up, will DC line today after alternative IV access. ECHO showed new onset ICM; EF 20-25%. Repeat on dobutamine 4/11/2021 showed EF of 25-30, mitral regurgitation reduced to mild to moderate. 4/16/2021-ECHO stable. Patient declined for cardiac surgery due to prohibitively high risk Patient underwent successful PCI/ADDIS to mid LAD 4/13/21 without need for impella support. Re-admitted to ICU after procedure. Developed hematoma overnight after sheath pull, taken to OR by vascular surgery. Hemodynamically unstable, started on multiple pressors, weaned over weekend. Remains only on dobutamine at 10 mcg at this time. Continue plavix, closely monitor YOANA drain outpt. Hold Eliquis Monitor H/H, hemoynamics, YOANA drain outpt. Record every hour. Mild/mod. mitral regurgitation: 
ECHO on 4/11/2021 showed mild/mod MR Repeat echo from 4/16/2021 noted  
  
Acute on chronic systolic HF, with cardiogenic and hypovolemic shock:  
Continue pressor support to help maintain hemodynamic stability 
+ fluid status now with all the colloid and fluids for her shock state, creatinine stable although RN reports low U.O. now on bumex gtt at 1mg/hr, Nephrology following. CXray 4/19 shows slightly improved airspace disease Parox A-fib s/p AV node ablation   
Coreg held with cardiogenic shock on pressors Hold eliquis Hypertension: controlled, actually a little low normal after sedation Monitor closely. ON bumex gtt, hold coreg Continue inotropic support  
  
PARAG on Chronic kidney disease stage IV : baseline 2-2.1 Renal function improved with dobutamine and improved cardiac output. Avoid nephrotoxic substances Follow BMP-remains relatively stable Nephrology following, alexing  
  
DM II: 
Manage per internal medicine  
  
HLD:  
LDL 44 on 4/11/2021 Started low-dose statin due to three-vessel disease Noted his history of questionable nausea only with pravastatin in 2010, and that statin may have been held due to elevated LFTs, but LFTs are currently normal 
  
Anxiety:   
Continue zoloft -on hold  
  
Chronic pain: 
Management as per pulmonary critical care and internal medicine Fentanyl as needed Acute blood loss anemia:  
Has received 11 units PRBC's thus far- FFP (5), and platelets (1) Monitor H/H, transfuse as needed to keep hgb greater than 8-9 with systolic HF. Has been 7 range over weekend. Give 1 unit today- goal 8-9 with low BP and low LVEF Monitor and document YOANA output q 1 hour, notify provider if increases Leukocytosis with low grade temp: 
Closely monitor, could be related to atelectasis marcio Cortes changed and pancultured, given vanc times 1 4/19/21 Dirk Singh, MARCEL  
DNP,APRN,AG-ACNP-BC Patient seen and examined by me with the above nurse practitioner.   I personally performed all components of the history, physical, and medical decision making and agree with the assessment and plan with minor modifications as noted. Today the patient has improved pressor requirement but rising WBCs. Hemoglobin down to about 7. General PE Gen:  NAD Mental Status - Alert. General Appearance - Not in acute distress. HEENT: 
PERRL, no carotid bruits or JVD Chest and Lung Exam  
Inspection: Accessory muscles - No use of accessory muscles in breathing. Auscultation:  
Breath sounds: - Normal.  
Cardiovascular Inspection: Jugular vein - Bilateral - Inspection Normal.  
Palpation/Percussion:  
Apical Impulse: - Normal.  
Auscultation: Rhythm - Regular. Heart Sounds - S1 WNL and S2 WNL. No S3 or S4. Murmurs & Other Heart Sounds: Auscultation of the heart reveals - No Murmurs. Peripheral Vascular Upper Extremity: Inspection - Bilateral - No Cyanotic nailbeds or Digital clubbing. Lower Extremity:  
Palpation: Edema - Bilateral - No edema. Abdomen:   Soft, non-tender, bowel sounds are active. Neuro: A&O times 3, CN and motor grossly WNL Change [place], panculture, discontinue [place] once good IV access is obtained. Give a unit of blood with a goal of hemoglobin 8-9 with low LVEF and low blood pressure. Greater than 30 minutes of critical care time spent at the bedside exclusive of procedures.

## 2021-04-19 NOTE — PROGRESS NOTES
Music Therapy Assessment [address] Petros Emanuel [id number]    
1960  63 y.o.  female Patient Telephone Number: [phone] (home) Restorationist Affiliation: [employer]  
Language: Regina Izquierdo Patient Active Problem List  
 Diagnosis Date Noted  S/P atrioventricular rolando ablation 11/17/2020 Priority: 1 - One  
 Hypovolemic shock ([facility] [address]  Cardiogenic shock (HCC)  DNR (do not resuscitate) discussion  Goals of care, counseling/discussion  Need for emotional support  Shortness of breath  S/P cardiac cath 04/08/2021  CHF (congestive heart failure) ([facility] 04/07/2021  
 NSTEMI (non-ST elevated [facility] infarction) [facility] 04/07/2021  Gastroesophageal reflux disease without esophagitis 12/29/2020  Acute respiratory failure ([facility] 12/20/2020  UTI (urinary tract infection) 12/20/2020  Pneumonia 12/20/2020  
 SOB (shortness of breath) 12/20/2020  CKD (chronic kidney disease) 12/20/2020  Anticoagulated 12/20/2020  PARAG (acute kidney injury) ([facility] 12/20/2020  Fluid overload 12/20/2020  Subtherapeutic international normalized ratio (INR) 12/20/2020  Suspected COVID-19 virus infection 12/20/2020  Chest pain 11/23/2020  Left below-knee amputee ([facility] [facility] 06/11/2020  H/O bilateral mastectomy 03/26/2020  Foot deformity, right 09/24/2019  Pes cavus 09/24/2019  Diabetic polyneuropathy associated with type 2 diabetes mellitus ([facility] 11/13/2018  Morbid obesity with BMI of 40.0-44.9, adult ([facility] 05/01/2017  Controlled type 2 diabetes mellitus with stage 4 chronic kidney disease, with long-term current use of insulin ([facility] 01/16/2017  PAF (paroxysmal atrial fibrillation) ([facility] [facility] 11/28/2016  Anemia in stage 4 chronic kidney disease ([facility] 11/28/2016  Essential hypertension 08/26/2016  Systolic murmur 55/27/6277  CKD (chronic kidney disease), stage IV ([facility] 06/09/2012  Mixed hyperlipidemia 05/22/2012  Long term (current) use of anticoagulants 04/11/2012  S/P cardiac pacemaker procedure 04/03/2012  Sick sinus syndrome ([facility] 04/02/2012  Status post ablation of atrial fibrillation 12/15/2011  Fe deficiency anemia 04/14/2010  
 History of breast cancer 04/13/2010  Asthma 04/13/2010 Date: 4/19/2021            Total Time (in minutes): 20          MRM 2 CRITICAL CARE 2 Mental Status:   [  ] Alert [  ] Elyce Doles [  ]  Confused  [x] Minimally responsive  [  ] Sleeping Communication Status: [  ] Impaired Speech [  ] Nonverbal -N/A Physical Status:   [x] Oxygen in use-on vent  [  ] Hard of Hearing [  ] Vision Impaired [  ] Ambulatory  [  ] Ambulatory with assistance [  ] Non-ambulatory Music Preferences, Background: Pt's spouse Elizabeth Mcgowan said pt likes Top 40 music from the 1990s, 2000s and Contemporary, some Renee Spike, but not [address] or Jocelyne. Clinical Problem addressed: Emotional support for pt and spouse. Goal(s) met in session: 
Physical/Pain management (Scale of 1-10): Pre-session rating: N/A: Please see Session Observations below. Post-session rating: N/A: Please see Session Observations below. [  ] Increased relaxation   [  ] Affected breathing patterns [  ] Decreased muscle tension   [  ] Decreased agitation [  ] Affected heart rate    [  ] Increased alertness Emotional/Psychological: 
[  ] Increased self-expression   [  ] Decreased aggressive behavior [  ] Decreased feelings of stress  [  ] Discussed healthy coping skills [  ] Improved mood    [  ] Decreased withdrawn behavior Social: 
[  ] Decreased feelings of isolation/loneliness [  ] Positive social interaction  
[x] Provided support and/or comfort for family/friends Spiritual: 
[  ] Spiritual support    [  ] Expressed peace [  ] Expressed adriane    [  ] Discussed beliefs Techniques Utilized (Check all that apply):  
[  ] Procedural support MT [  ] Music for relaxation [x] Patient preferred music 
[  ] Brittany analysis  [  ] Song choice  [  ] Music for validation [  ] Entrainment  [  ] Movement to music [  ] Guided visualization [  ] Diana Bourne  [  ] Patient instrument playing [  ] Aura Tomas writing [  ] Julio Rooney along   [  ] Sai Hudson  [  ] Sensory stimulation 
[x] Active Listening-pt's sp. [  ] Music for spiritual support [  ] Making of CDs as gifts Session Observations:  Referral from [facility], Palliative NP. This music therapist (MT) spoke on the phone with the patient's (pt's) spouse Angélica Barraza to offer support and ask about the pt's music preferences. Pt's spouse shared these. He thanked MT for the pt receiving music therapy, and the tone of his voice indicated that this was meaningful for him. Pt was lying in bed with her eyes closed. Her RN confirmed that the pt's eyes are usually closed because she is under some measure of sedation. Knowing this, MT knocked on the pt's door, introduced self, shared about speaking on the phone with the pt's spouse, and what the pt could expect to hear. MT sang the IAMINTOIT of [employer], the [employer]op All of Me, and the [employer]. Pt's eyes remained closed and she appeared to be minimally responsive throughout the session. CONCHA MartinezBC (Music Therapist-Board Certified) Spiritual Care Department Referral-based service

## 2021-04-19 NOTE — PROGRESS NOTES
0700 Bedside and Verbal shift change report given to Dannie Soto (oncoming nurse) by Marya Contreras (offgoing nurse). Report included the following information SBAR, Kardex, Procedure Summary, Intake/Output, MAR, Recent Results and Cardiac Rhythm paced 0800 pt assessed per flowsheet, wound care complete to right groin sites. Pt zoey well 1445 attempted to wean down sedation with hopes of improved cognition. Prop is down to 10, pt still without eye opening, no command following. resp slightly more labored 1800 SVO2 decreasing, resp labored, face turning red. Prop resumed 1900 sedation improved. Pt with low urinary output, Lasix was adm today with minimal results. Bumex started.  was in for brief visit today Bedside and Verbal shift change report given to Marya Contreras (oncoming nurse) by Dannie Soto (offgoing nurse). Report included the following information SBAR, Kardex, Procedure Summary, Intake/Output, MAR, Recent Results and Cardiac Rhythm paced.

## 2021-04-19 NOTE — PROGRESS NOTES
1900 - 0800 Bedside and Verbal shift change report given to Sheba Harrell (oncoming nurse) by Fabian Warren (offgoing nurse). Report included the following information SBAR, Kardex, Intake/Output, MAR, Recent Results and Cardiac Rhythm Paced. Shift assessment complete, not interactive. Does not follow commands. Intubated and sedated on propofol. Meds ( Bumex and Dobutamine ) infusing. VSS, see flow sheet for details. Large loose BM from incontinence. Bath complete, linen / gown etc ... changed. See flow sheet for details. Reassessment complete, no change. R groin hematoma and bloody drainage from procedure site, new dressing applied. Repositioned for comfort. End of Shift Note Bedside shift change report given to Alcon De Souza (oncoming nurse) by Angel Li RN (offgoing nurse). Report included the following information SBAR, Kardex, Intake/Output, MAR, Recent Results and Cardiac Rhythm Paced Shift worked:  Debbie Avoca Shift summary and any significant changes:  
  None Concerns for physician to address:  R groin hematoma / echymosis / sanguinous drainage. Zone phone for oncoming shift:   [phone] Activity: 
Activity Level: Bed Rest 
Number times ambulated in hallways past shift: 0 Number of times OOB to chair past shift: 0 Cardiac:  
Cardiac Monitoring: Yes     
Cardiac Rhythm: Paced Access:  
Current line(s): central line and midline Genitourinary:  
Urinary status: buckley Respiratory:  
O2 Device: Endotracheal tube, Ventilator Chronic home O2 use?: NO Incentive spirometer at bedside: NO 
  
GI: 
Last Bowel Movement Date: 04/18/21 Current diet:  DIET TUBE FEEDING Passing flatus: YES Tolerating current diet: YES 
% Diet Eaten: 100 % Pain Management:  
Patient states pain is manageable on current regimen: YES Skin: 
Valdemar Score: 9 Interventions: turn team and float heels Patient Safety: 
Fall Score: Total Score: 3 Interventions: gripper socks High Fall Risk: Yes 
 Length of Stay: 
Expected LOS: 3d 21h Actual LOS: 12 Ephraim Harrell RN

## 2021-04-19 NOTE — PROGRESS NOTES
Care Management: 
 
Transition of Care Plan: 
  
RUR: 40% Disposition: Anticipate rehab needs. .. Follow up appointments: PCP, Cardiology DME needed: none, has DME at home. Transportation at Discharge:  [facility] or  to provide at d/c  
Adriana Salcido or means to access home:   has access to home IM Medicare letter:  N/A Caregiver Contact: - Priya Bills- [phone] Discharge Caregiver contacted prior to discharge? 
  
Pt w/ NSTEMI is not candidate for CABG, underwent high risk PCI - now with R groin hematoma from arterial bleed - s/p exploration/evacuation and arterial ligation. Multipressor support weaning down some. Remains vented. . She continues to be critical in the ICU. Palliative is working with family. There is talk of dialysis versus hospice. Ankita Alan   
CM will remain avbl to assist as needed.  
 
Shanique Olvera RN ACM

## 2021-04-19 NOTE — PROGRESS NOTES
Nephrology Progress Note Monroe Kiser  
 
[facility]. [facility]  Phone - [phone] Patient: Jeancarlos Hanna    YOB: 1960 Date- 4/19/2021   Admit Date: 4/7/2021 CC: Follow up for PARAG on CKD IMPRESSION & PLAN:  
· ACUTE KIDNEY INJURY - likely due to ATN,CRS from suppressed EF from recent MI and NOE.  
· HYPONATREMIA- DUE TO CHF AND HYPOTONIC IVF D5W 
· Hypoglycemia · Hypokalemia · Large tense painful hematoma R groin/thigh after R CFA access for cardiac cath · Shock- cardiogenic + hemorrhagic   RESOLVED · Edema- volume overload · Iron defi anemia · Hyponatremia due to chf 
· Hypervolemia · NSTEMI: s/p cardiac cath which showed MVD, mod/severe MR, and PHTN, cardiogenic shock due to ischemic cardiomyopathy- s/p PCI/ADDIS to mid LAD  on 4-13-21 
· afib - s/p AV node ablation · Anemia - CHRONIC- f/b DR. RUIZ 
· hypertension · H/O LEFT BKA · ckd  3 LIKELY due to DM nephropathy and HTN nephrosclerosis- BL CR. 1.5-1.9 
· H/O Diabetic foot · Proteinuria likely due to DM nephropathy and HTN nephroscl- urine pr/cr 1.5 g/g · Vit d defi PLAN- 
· Remain significantly hypervolemic · On bumex gtt 1mg/hour with no meaningful UOP · Will add Diuril 250 mg BID times 4 doses · She is not a  long term candidate for RRT 
· Consider palliative /hospice care · DNR now. Subjective: Interval History:  
- remains on vent 
- Off all pressors - Remains on dobutamine 
- UOP not significant on bumex gtt - Remains hypervolemic Objective:  
Vitals:  
 04/19/21 0630 04/19/21 0645 04/19/21 0700 04/19/21 0800 BP:      
Pulse: 92 91 91 92 Resp: 23 22 24 23 Temp: 99.4 °F (37.4 °C) 99.4 °F (37.4 °C) 99.3 °F (37.4 °C) 99.4 °F (37.4 °C) SpO2: 100% 100% 100% 100% Weight:      
Height:      
  
04/18 0701 - 04/19 0700 In: 2030.8 [I.V.:1655.8] Out: 1961 [Urine:1025; ZOCYSR:671] Last 3 Recorded Weights in this Encounter  04/16/21 1727 04/18/21 0430 04/19/21 0400  
Weight: 125 kg (275 lb 9.2 oz) 127.4 kg (280 lb 13.9 oz) 128.5 kg (283 lb 4.7 oz) Physical exam:  
GEN: intubated on vent NECK-no mass, ETTUBE 
RESP: clear b/l, no wheezing CVS: RRR,S1,S2 EXT: ++ Edema ,bka stump + NEURO:Can't access due to patient's current condition  : Munroe + Chart reviewed. Pertinent Notes reviewed. Data Review : 
Recent Labs 04/19/21 
7302 04/18/21 
5484 04/17/21 
0670 * 131* 129*  
K 3.7 4.1 4.1  104 102 CO2 20* 18* 19* BUN 34* 34* 33* CREA 2.48* 2.44* 2.19* * 174* 169* CA 7.9* 8.0* 7.9*  
MG 2.0 2.1 2.2 PHOS 4.5 4.8* 4.6 Recent Labs 04/19/21 
9722 04/18/21 
7113 04/17/21 
3998 WBC 20.4* 15.9* 13.6* HGB 7.1* 7.4* 7.6* HCT 21.6* 22.8* 22.5*  
 150 125* No results for input(s): FE, TIBC, PSAT, FERR in the last 72 hours. Medication list  reviewed Current Facility-Administered Medications Medication Dose Route Frequency  chlorothiazide (DIURIL) 250 mg in sterile water (preservative free) 9 mL injection  250 mg IntraVENous Q12H  
 bumetanide (BUMEX) 0.25 mg/mL infusion  1 mg/hr IntraVENous CONTINUOUS  
 insulin lispro (HUMALOG) injection   SubCUTAneous Q6H  
 dextrose (D50W) injection syrg 12.5-25 g  12.5-25 g IntraVENous PRN  propofol (DIPRIVAN) 10 mg/mL infusion  0-50 mcg/kg/min IntraVENous TITRATE  chlorhexidine (ORAL CARE KIT) 0.12 % mouthwash 15 mL  15 mL Oral Q12H  clopidogreL (PLAVIX) tablet 75 mg  75 mg Oral DAILY  fentaNYL citrate (PF) injection 50 mcg  50 mcg IntraVENous Q2H PRN  
 fentaNYL citrate (PF) injection 25 mcg  25 mcg IntraVENous Q1H PRN  
 aspirin chewable tablet 81 mg  81 mg Per G Tube DAILY  EPINEPHrine (ADRENALIN) 10 mg in 0.9% sodium chloride 250 mL infusion  0-20 mcg/min IntraVENous TITRATE  famotidine (PF) (PEPCID) 20 mg in 0.9% sodium chloride 10 mL injection  20 mg IntraVENous Q24H  
 sodium chloride (NS) flush 5-40 mL  5-40 mL IntraVENous Q8H  
 sodium chloride (NS) flush 5-40 mL  5-40 mL IntraVENous PRN  
 naloxone (NARCAN) injection 0.4 mg  0.4 mg IntraVENous PRN  
 heparin (porcine) 2,000 Units in 0.9% sodium chloride 500 mL Irrigation    PRN  
 balsam peru-castor oiL (VENELEX) ointment   Topical BID  atorvastatin (LIPITOR) tablet 10 mg  10 mg Oral DAILY  [Held by provider] apixaban (ELIQUIS) tablet 5 mg  5 mg Oral Q12H  
 magic mouthwash cpd (without sucralfate)  5 mL Oral TID PRN  
 DOBUTamine (DOBUTREX) 500 mg/250 mL (2,000 mcg/mL) infusion  0-10 mcg/kg/min IntraVENous TITRATE  [Held by provider] carvediloL (COREG) tablet 3.125 mg  3.125 mg Oral BID WITH MEALS  
 alcohol 62% (NOZIN) nasal  1 Ampule  1 Ampule Topical Q12H  
 nitroglycerin (NITROSTAT) tablet 0.4 mg  0.4 mg SubLINGual PRN  
 sodium chloride (NS) flush 5-40 mL  5-40 mL IntraVENous Q8H  
 sodium chloride (NS) flush 5-40 mL  5-40 mL IntraVENous PRN  
 acetaminophen (TYLENOL) tablet 650 mg  650 mg Oral Q6H PRN Or  
 acetaminophen (TYLENOL) suppository 650 mg  650 mg Rectal Q6H PRN  polyethylene glycol (MIRALAX) packet 17 g  17 g Oral DAILY PRN  promethazine (PHENERGAN) tablet 12.5 mg  12.5 mg Oral Q6H PRN Or  
 ondansetron (ZOFRAN) injection 4 mg  4 mg IntraVENous Q6H PRN  
 glucose chewable tablet 16 g  4 Tab Oral PRN  
 glucagon (GLUCAGEN) injection 1 mg  1 mg IntraMUSCular PRN Cyndie Patee, [address] [facility] [employer] [address], [address] [facility], [address] Phone - [phone]               Fax - [phone]

## 2021-04-19 NOTE — PROGRESS NOTES
Acknowledge PICC order. Discussed with Dr Downs. If patient has a bacteremia pt would benefit from an IJ until cultures are negative before placing a PICC line.   Will follow up with CCU in am. 
 
Dayna FELTONN, RN, [facility] 
[address] [facility] Vascular Access Team

## 2021-04-20 NOTE — PROGRESS NOTES
Palliative Medicine Consult Don: [id number] Patient Name: Félix Reyna YOB: 1960 Date of Initial Consult: 4/14/2021 Reason for Consult: Care Decisions Requesting Provider: Angelic Schultz MD 
Primary Care Physician: Te Baez MD 
 
 SUMMARY:  
Félix Reyna is a 63 y.o. with a past history of a-fib, breast cancer, CKD, DMII, HTN, morbid obesity, osteoarthritis, pacemaker, and asthma, who was transferred on 4/7/2021 from [facility] with a diagnosis of Chest pain and acute on chronic CHF. Planned for cardiac cath +/- PCI She was a high risk PCI, but agreed to the risk as her risk of progressive heart failure and death was likely higher without cardiac cath She had a cath on 4/8 which showed multi vessel disease and moderate to severe MR with pulm htn. She also has new onset ICM with an EF of 20-25% She was transferred to the CCU- was treated for cardiogenic shock and started on inotropic support. Cardiac surgery evaluated her, but patient declined a surgical option due to it being too high risk. [place] catheter was placed on 4/9 She underwent PCI/ADDIS to mid LAD on 4/13- it was successful without need for impella support. That evening, after the sheath was pulled, she had an actively expanding hematoma to the right groin. Vascular surgery took her to the OR for a right groin exploration and hematoma evacuation. Drains were placed to help control bleeding She has had impressive output from the drains still this morning. She is being transfused Current medical issues leading to Palliative Medicine involvement include: goals of care in the setting of critical illness requiring a high risk cardiac procedure that resulted in complications PALLIATIVE DIAGNOSES:  
1. DNR Discussion 2. Goals of care 3. Need for emotional support 4. Shortness of breath 5. Hypovolemic shock 6. Cardiogenic shock PLAN:  
1.  Discussed Ms Elizabeth Austin during rounds with the attending the the rest of the ICU team.  Tentative plan to extubate today if she continues to do well 2. I called her  to touch base- he has some primitive understanding of how she is doing 3. We talked about the plan for today, and I broached the subject of a one way extubation. He had shared with me before that she did not want to be kept alive on machines, and he agrees that this is still true. He is OK with a one way extubation but is understandably scared 4. He will be here within the next 2 hours, so will wait to extubate until he can be present in the event she decompensates 5. Will follow up later today or tomorrow pending the events of the day 6. Initial consult note routed to primary continuity provider and/or primary health care team members 7. Communicated plan of care with: Palliative [facility] Team 
 
Addendum:  Came back to the ICU to be with Mr Nicki Knapp while his wife was being extubated. Talked him through the process, and what to expect, but also tried to prepare him again \"what if\". He is appropriately nervous but also seems excited that she is making progress. Stayed with him through extubation and followed for bit afterwards. She is slightly labored but maintaining her sats on bipap. Unsure how low she will be able to maintain this- but hopefully prepared  for possible outcomes She still has a lot going on, so will continue to follow for support GOALS OF CARE / TREATMENT PREFERENCES:  
 
GOALS OF CARE: 
Patient/Health Care Proxy Stated Goals: Rehabilitation TREATMENT PREFERENCES:  
Code Status: DNR Advance Care Planning: 
[x] [facility] Interdisciplinary Team has updated the ACP Navigator with [employer] and Patient Capacity Primary Decision Maker (Active): PaAdonis - Spouse - [phone] Secondary Decision Maker: Hugo Byers - Other Relative - [phone] Advance Care Planning 4/7/2021 Confirm Advance Directive Yes, on file Patient Would Like to Complete Advance Directive - Medical Interventions: Limited additional interventions Other Instructions: Other: As far as possible, the palliative care team has discussed with patient / health care proxy about goals of care / treatment preferences for patient. HISTORY:  
 
History obtained from: chart, family CHIEF COMPLAINT: none- intubated and sedated HPI/SUBJECTIVE: The patient is:  
[] Verbal and participatory [x] Non-participatory due to:  
condition Clinical Pain Assessment (nonverbal scale for severity on nonverbal patients):  
Clinical Pain Assessment Severity: 0 Activity (Movement): Lying quietly, normal position Duration: for how long has pt been experiencing pain (e.g., 2 days, 1 month, years) Frequency: how often pain is an issue (e.g., several times per day, once every few days, constant) FUNCTIONAL ASSESSMENT:  
 
Palliative Performance Scale (PPS): PPS: 30 
 
 
 PSYCHOSOCIAL/SPIRITUAL SCREENING:  
 
Palliative IDT has assessed this patient for cultural preferences / practices and a referral made as appropriate to needs (Cultural Services, Patient Advocacy, Ethics, etc.) Any spiritual / Islam concerns: 
[] Yes /  [x] No 
 
Caregiver Burnout: 
[] Yes /  [x] No /  [] No Caregiver Present Anticipatory grief assessment:  
[x] Normal  / [] Maladaptive ESAS Anxiety: Anxiety: 0 
 
ESAS Depression: Depression: 0 REVIEW OF SYSTEMS:  
 
Positive and pertinent negative findings in ROS are noted above in HPI. The following systems were [x] reviewed / [] unable to be reviewed as noted in HPI Other findings are noted below. Systems: constitutional, ears/nose/mouth/throat, respiratory, gastrointestinal, genitourinary, musculoskeletal, integumentary, neurologic, psychiatric, endocrine. Positive findings noted below. Modified ESAS Completed by: provider Fatigue: 10    
Depression: 0 Pain: 0 Anxiety: 0 Nausea: 0 Dyspnea: 0 Stool Occurrence(s): 1 PHYSICAL EXAM:  
 
From RN flowsheet: 
Wt Readings from Last 3 Encounters:  
04/20/21 269 lb 10 oz (122.3 kg) 03/10/21 232 lb (105.2 kg) 02/08/21 264 lb (119.7 kg) Blood pressure (!) 114/48, pulse 89, temperature 98.7 °F (37.1 °C), resp. rate 22, height 5' 10\" (1.778 m), weight 269 lb 10 oz (122.3 kg), SpO2 100 %. Pain Scale 1: Behavioral Pain Scale (BPS) Pain Intensity 1: 5 Pain Onset 1: acute Pain Location 1: Leg 
Pain Orientation 1: Right Pain Description 1: Aching Pain Intervention(s) 1: Medication (see MAR) Last bowel movement, if known:  
 
Constitutional: sedated ENMT: no nasal discharge, moist mucous membranes Respiratory: on vent Skin: warm, dry, surgical sites to groin, two drains Other: 
 
 
 HISTORY:  
 
Active Problems: 
  Chest pain (11/23/2020) CHF (congestive heart failure) ([facility] (4/7/2021) NSTEMI (non-ST elevated myocardial infarction) ([facility] (4/7/2021) S/P cardiac cath (4/8/2021) Overview: 4/13/2021: s/p PCI/ADDIS to mid LAD x 1  
    4/8/2021: cardiac cath showed MVD. Mod/severe MR, elevated PA pressures DNR (do not resuscitate) discussion () Goals of care, counseling/discussion () Need for emotional support () Shortness of breath () Hypovolemic shock (HCC) () Cardiogenic shock (HCC) () Past Medical History:  
Diagnosis Date  Acquired lymphedema Right arm  Acute respiratory failure ([address] 12/19/2020  Arrhythmia  Asthma  Atrial fibrillation ([facility] Dr. Octavio Sheets and Dr. Sharyle Skiff flutter [facility])  Cancer ([address] bilateral breast  
 Chronic kidney disease  Diabetes mellitus type II   
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 ([facility] 3/3/2020  Dizziness  Essential hypertension  Hyperlipidemia  Hypertension  Long term (current) use of anticoagulants  Morbid obesity ([facility] 3/14/2012  Nausea & vomiting  Neuropathy  Osteoarthritis  Pacemaker  Sick sinus syndrome ([facility]  Type 2 diabetes mellitus with left diabetic foot infection ([facility] 10/29/2019 Past Surgical History:  
Procedure Laterality Date  HX AFIB ABLATION    
 HX AMPUTATION FOOT Left 04/2020  HX BREAST RECONSTRUCTION Bilateral   
 HX CARPAL TUNNEL RELEASE [address] [facility] [address] RIGHT MODIFIED RADICAL   
 HX MASTECTOMY Left   
 no lymphnode removal  
 HX MOHS PROCEDURES  1995  
 right  HX ORTHOPAEDIC Right   
 knee surgery  HX PACEMAKER  11/17/2020 Dr. Tatum Evans. Insertion of permanent pacemaker. AV node ablation  HX PACEMAKER    
 IR INSERT TUNL CVC W PORT OVER 5 YEARS    
 IR INSERT TUNL CVC W/O PORT OVER 5 YR  5/4/2020  IR INSERT TUNL CVC W/O PORT OVER 5 YR  9/8/2020  IR REMOVE TUNL CVAD W/O PORT / PUMP  6/26/2020  IR REMOVE TUNL CVAD W/O PORT / PUMP  10/19/2020  [place] ABDOMEN SURGERY PROC UNLISTED    
 gastric bypass [address] AND 1994  [place] GASTRIC BYPASS,OBESE<150CM [id number]  7/08  
 [facility]  [place] ICAR CATHETER ABLATION ATRIOVENTR NODE FUNCTION N/A 11/17/2020 ABLATION AV NODE performed by Frank Hernandez MD at [address] CATH LAB [address]  [place] NEEDLE BIOPSY LIVER,W OTHR PROC  8.21.07  
 [place] RELOCATION OF SKIN POCKET FOR PACEMAKER N/A 4/24/2020 RELOCATE 2 [facility] performed by Trevon Pereira MD at [address] CATH LAB  [place] RMVL TRANSVNS PM ELTRD 1 LEAD SYS ATR/VENTR N/A 4/24/2020 Remove Pacemaker Dual Leads performed by Trevon Pereira MD at [facility] CATH LAB  [place] RMVL TRANSVNS PM ELTRD 1 LEAD SYS ATR/VENTR N/A 4/27/2020  REMOVE PACEMAKER DUAL LEADS performed by Joel Becker Montserrat Hodges MD at [facility] CATH LAB  [place] TCAT INSJ/RPL PERM LEADLESS PACEMAKER RV W/IMG N/A 11/17/2020 INSERT OR REPLACE TRANSCATH PPM LEADLESS performed [place]us Elizabeth [place] [address], [facility]/[address] Dr CATH LAB  [place] TRABECULOPLASTY BY LASER SURGERY    
 US GUIDE ASP OVARIAN CYST ABD APPROACH  8.21.07  
 RIGHT Family History Problem Relation Age of Onset  Cancer Mother  Heart Disease Mother  Alcohol abuse Father  Diabetes Brother  Hypertension Brother History reviewed, no pertinent family history. Social History Tobacco Use  Smoking status: Never Smoker  Smokeless tobacco: Never Used Substance Use Topics  Alcohol use: Not Currently Allergies Allergen Reactions  Avapro [Irbesartan] Unable to Obtain  Bactrim [Sulfamethoxazole-Trimethoprim] Other (comments) Sore mouth  Contrast Agent [Iodine] Itching [address]  Morphine Nausea and Vomiting and Swelling  Penicillins Hives Did not tolerate cefepime 3/2020  Pravachol [Pravastatin] Nausea Only  Seafood [Shellfish Containing Products] Hives  Singulair [Montelukast] Other (comments)  
  palpitations  Ace Inhibitors Cough  Amlodipine Swelling  Captopril Cough  Carvedilol Diarrhea Current Facility-Administered Medications Medication Dose Route Frequency  chlorothiazide (DIURIL) 250 mg in sterile water (preservative free) 9 mL injection  250 mg IntraVENous Q12H  
 meropenem (MERREM) 500 mg in 0.9% sodium chloride (MBP/ADV) 50 mL MBP  0.5 g IntraVENous Q8H  
 vancomycin (VANCOCIN) 750 mg in 0.9% sodium chloride 250 mL (VIAL-MATE)  750 mg IntraVENous Q24H  Vancomycin - Pharmacy to Dose   Other Rx Dosing/Monitoring  0.9% sodium chloride infusion 250 mL  250 mL IntraVENous PRN  
 bumetanide (BUMEX) 0.25 mg/mL infusion  1 mg/hr IntraVENous CONTINUOUS  
 insulin lispro (HUMALOG) injection   SubCUTAneous Q6H  
 dextrose (D50W) injection syrg 12.5-25 g  12.5-25 g IntraVENous PRN  propofol (DIPRIVAN) 10 mg/mL infusion  0-50 mcg/kg/min IntraVENous TITRATE  chlorhexidine (ORAL CARE KIT) 0.12 % mouthwash 15 mL  15 mL Oral Q12H  clopidogreL (PLAVIX) tablet 75 mg  75 mg Oral DAILY  fentaNYL citrate (PF) injection 25 mcg  25 mcg IntraVENous Q1H PRN  
 aspirin chewable tablet 81 mg  81 mg Per G Tube DAILY  famotidine (PF) (PEPCID) 20 mg in 0.9% sodium chloride 10 mL injection  20 mg IntraVENous Q24H  
 sodium chloride (NS) flush 5-40 mL  5-40 mL IntraVENous Q8H  
 sodium chloride (NS) flush 5-40 mL  5-40 mL IntraVENous PRN  
 naloxone (NARCAN) injection 0.4 mg  0.4 mg IntraVENous PRN  
 heparin (porcine) 2,000 Units in 0.9% sodium chloride 500 mL Irrigation    PRN  
 balsam peru-castor oiL (VENELEX) ointment   Topical BID  atorvastatin (LIPITOR) tablet 10 mg  10 mg Oral DAILY  [Held by provider] apixaban (ELIQUIS) tablet 5 mg  5 mg Oral Q12H  
 magic mouthwash cpd (without sucralfate)  5 mL Oral TID PRN  
 DOBUTamine (DOBUTREX) 500 mg/250 mL (2,000 mcg/mL) infusion  0-10 mcg/kg/min IntraVENous TITRATE  [Held by provider] carvediloL (COREG) tablet 3.125 mg  3.125 mg Oral BID WITH MEALS  
 alcohol 62% (NOZIN) nasal  1 Ampule  1 Ampule Topical Q12H  
 nitroglycerin (NITROSTAT) tablet 0.4 mg  0.4 mg SubLINGual PRN  
 sodium chloride (NS) flush 5-40 mL  5-40 mL IntraVENous Q8H  
 sodium chloride (NS) flush 5-40 mL  5-40 mL IntraVENous PRN  
 acetaminophen (TYLENOL) tablet 650 mg  650 mg Oral Q6H PRN Or  
 acetaminophen (TYLENOL) suppository 650 mg  650 mg Rectal Q6H PRN  polyethylene glycol (MIRALAX) packet 17 g  17 g Oral DAILY PRN  promethazine (PHENERGAN) tablet 12.5 mg  12.5 mg Oral Q6H PRN  Or  
 ondansetron (ZOFRAN) injection 4 mg  4 mg IntraVENous Q6H PRN  
 glucose chewable tablet 16 g  4 Tab Oral PRN  
 glucagon (GLUCAGEN) injection 1 mg  1 mg IntraMUSCular PRN  
 
 
 
 LAB AND IMAGING FINDINGS:  
 Lab Results Component Value Date/Time WBC 23.8 (H) 04/20/2021 04:33 AM  
 HGB 8.2 (L) 04/20/2021 04:33 AM  
 PLATELET 487 46/19/9198 04:33 AM  
 
Lab Results Component Value Date/Time Sodium 130 (L) 04/20/2021 04:33 AM  
 Potassium 3.3 (L) 04/20/2021 04:33 AM  
 Chloride 101 04/20/2021 04:33 AM  
 CO2 18 (L) 04/20/2021 04:33 AM  
 BUN 35 (H) 04/20/2021 04:33 AM  
 Creatinine 2.47 (H) 04/20/2021 04:33 AM  
 Calcium 8.1 (L) 04/20/2021 04:33 AM  
 Magnesium 2.1 04/20/2021 04:33 AM  
 Phosphorus 4.7 04/20/2021 04:33 AM  
  
Lab Results Component Value Date/Time Alk. phosphatase 85 04/20/2021 04:33 AM  
 Protein, total 4.5 (L) 04/20/2021 04:33 AM  
 Albumin 1.9 (L) 04/20/2021 04:33 AM  
 Globulin 2.6 04/20/2021 04:33 AM  
 
Lab Results Component Value Date/Time INR 1.2 (H) 04/20/2021 04:33 AM  
 Prothrombin time 12.4 (H) 04/20/2021 04:33 AM  
 aPTT 40.3 (H) 04/14/2021 09:54 AM  
  
Lab Results Component Value Date/Time Iron 10 (L) 12/23/2020 05:29 AM  
 TIBC 301 12/23/2020 05:29 AM  
 Iron % saturation 3 (L) 12/23/2020 05:29 AM  
 Ferritin 17 (L) 12/23/2020 05:29 AM  
  
Lab Results Component Value Date/Time pH 7.33 (L) 04/20/2021 05:24 AM  
 PCO2 32 (L) 04/20/2021 05:24 AM  
 PO2 127 (H) 04/20/2021 05:24 AM  
 
No components found for: [place] Lab Results Component Value Date/Time CK 4,609 (H) 05/14/2020 05:15 AM  
 CK - MB 1.3 04/09/2014 11:00 AM  
  
 
 
   
 
Total time:  30 
Counseling / coordination time, spent as noted above:20 
> 50% counseling / coordination?: y 
 
Prolonged service was provided for  []30 min   []75 min in face to face time in the presence of the patient, spent as noted above. Time Start:  
Time End:  
Note: this can only be billed with 71740 (initial) or 48493 (follow up). If multiple start / stop times, list each separately.

## 2021-04-20 NOTE — PROGRESS NOTES
PICC order acknowledged. Consent obtained from patient's  via telephone. Assessed patient's Left arm for PICC placement. Arm is extremely edematous. The left basilic vein measured 23% vessel occupancy with a 5Fr Catheter and 46% with a 4fr catheter. The brachial vein where the Midline is located would not compress. Unable to use Right arm due to right mastectomy with lymph node removal.   
 
Discussed findings with patient's Primary Nurse, Amy Pak RN who will inform physician. Flaco Gaspar, RN, BSN, [facility] Vascular Access Team

## 2021-04-20 NOTE — PROGRESS NOTES
Nephrology Progress Note Monroe Kiser  
 
[email or web address]  Phone - [phone] Patient: Isaac Vazquez    YOB: 1960 Date- 4/20/2021   Admit Date: 4/7/2021 CC: Follow up for PARAG on CKD IMPRESSION & PLAN:  
· ACUTE KIDNEY INJURY - likely due to ATN,CRS from suppressed EF from recent MI and NOE.  
· HYPONATREMIA- DUE TO CHF AND HYPOTONIC IVF D5W 
· Hypoglycemia · Hypokalemia · Large tense painful hematoma R groin/thigh after R CFA access for cardiac cath · Shock- cardiogenic + hemorrhagic   RESOLVED · Edema- volume overload · Iron defi anemia · Hyponatremia due to chf 
· Hypervolemia · NSTEMI: s/p cardiac cath which showed MVD, mod/severe MR, and PHTN, cardiogenic shock due to ischemic cardiomyopathy- s/p PCI/ADDIS to mid LAD  on 4-13-21 
· afib - s/p AV node ablation · Anemia - CHRONIC- f/b DR. RUIZ 
· hypertension · H/O LEFT BKA · ckd  3 LIKELY due to DM nephropathy and HTN nephrosclerosis- BL CR. 1.5-1.9 
· H/O Diabetic foot · Proteinuria likely due to DM nephropathy and HTN nephroscl- urine pr/cr 1.5 g/g · Vit d defi PLAN- 
· Remain significantly hypervolemic · On bumex gtt 1mg/hour with 3.2L UOP · Will continue on Diuril 250 mg BID for now. · Ordered for KCL 10 meq now times 1 dose. · She is not a  long term candidate for RRT · DNR now. Subjective: Interval History:  
- remains on vent 
- Off all pressors - Remains on dobutamine 
- UOP better on bumex gtt/Diuril combination - Remains hypervolemic Objective:  
Vitals:  
 04/20/21 0629 04/20/21 0700 04/20/21 0716 04/20/21 0800 BP:      
Pulse: 91 91 91 90 Resp: 26 28 27 26 Temp:  98 °F (36.7 °C)  98 °F (36.7 °C) SpO2: 98% 97% 95% 97% Weight:      
Height:      
  
04/19 0701 - 04/20 0700 In: 3185.2 [I.V.:1860.2] Out: 3183.5 [Urine:2290; Drains:893.5] Last 3 Recorded Weights in this Encounter 04/18/21 0430 04/19/21 0400 04/20/21 0500 Weight: 127.4 kg (280 lb 13.9 oz) 128.5 kg (283 lb 4.7 oz) 122.3 kg (269 lb 10 oz) Physical exam:  
GEN: intubated on vent NECK-no mass, ETTUBE 
RESP: clear b/l, no wheezing CVS: RRR,S1,S2 EXT: ++ Edema ,bka stump + NEURO:Can't access due to patient's current condition  : Munroe + Chart reviewed. Pertinent Notes reviewed. Data Review : 
Recent Labs  
  04/20/21 0433 04/19/21 
1972 04/18/21 
0554 * 131* 131*  
K 3.3* 3.7 4.1  103 104 CO2 18* 20* 18* BUN 35* 34* 34* CREA 2.47* 2.48* 2.44* * 159* 174* CA 8.1* 7.9* 8.0*  
MG 2.1 2.0 2.1 PHOS 4.7 4.5 4.8* Recent Labs  
  04/20/21 0433 04/19/21 
5259 04/18/21 
0554 WBC 23.8* 20.4* 15.9* HGB 8.2* 7.1* 7.4* HCT 24.7* 21.6* 22.8*  
 152 150 No results for input(s): FE, TIBC, PSAT, FERR in the last 72 hours. Medication list  reviewed Current Facility-Administered Medications Medication Dose Route Frequency  chlorothiazide (DIURIL) 250 mg in sterile water (preservative free) 9 mL injection  250 mg IntraVENous Q12H  
 meropenem (MERREM) 500 mg in 0.9% sodium chloride (MBP/ADV) 50 mL MBP  0.5 g IntraVENous Q8H  
 vancomycin (VANCOCIN) 750 mg in 0.9% sodium chloride 250 mL (VIAL-MATE)  750 mg IntraVENous Q24H  Vancomycin - Pharmacy to Dose   Other Rx Dosing/Monitoring  0.9% sodium chloride infusion 250 mL  250 mL IntraVENous PRN  
 sterile water (preservative free) injection  bumetanide (BUMEX) 0.25 mg/mL infusion  1 mg/hr IntraVENous CONTINUOUS  
 insulin lispro (HUMALOG) injection   SubCUTAneous Q6H  
 dextrose (D50W) injection syrg 12.5-25 g  12.5-25 g IntraVENous PRN  propofol (DIPRIVAN) 10 mg/mL infusion  0-50 mcg/kg/min IntraVENous TITRATE  chlorhexidine (ORAL CARE KIT) 0.12 % mouthwash 15 mL  15 mL Oral Q12H  clopidogreL (PLAVIX) tablet 75 mg  75 mg Oral DAILY  fentaNYL citrate (PF) injection 50 mcg  50 mcg IntraVENous Q2H PRN  
 fentaNYL citrate (PF) injection 25 mcg 25 mcg IntraVENous Q1H PRN  
 aspirin chewable tablet 81 mg  81 mg Per G Tube DAILY  EPINEPHrine (ADRENALIN) 10 mg in 0.9% sodium chloride 250 mL infusion  0-20 mcg/min IntraVENous TITRATE  famotidine (PF) (PEPCID) 20 mg in 0.9% sodium chloride 10 mL injection  20 mg IntraVENous Q24H  
 sodium chloride (NS) flush 5-40 mL  5-40 mL IntraVENous Q8H  
 sodium chloride (NS) flush 5-40 mL  5-40 mL IntraVENous PRN  
 naloxone (NARCAN) injection 0.4 mg  0.4 mg IntraVENous PRN  
 heparin (porcine) 2,000 Units in 0.9% sodium chloride 500 mL Irrigation    PRN  
 balsam peru-castor oiL (VENELEX) ointment   Topical BID  atorvastatin (LIPITOR) tablet 10 mg  10 mg Oral DAILY  [Held by provider] apixaban (ELIQUIS) tablet 5 mg  5 mg Oral Q12H  
 magic mouthwash cpd (without sucralfate)  5 mL Oral TID PRN  
 DOBUTamine (DOBUTREX) 500 mg/250 mL (2,000 mcg/mL) infusion  0-10 mcg/kg/min IntraVENous TITRATE  [Held by provider] carvediloL (COREG) tablet 3.125 mg  3.125 mg Oral BID WITH MEALS  
 alcohol 62% (NOZIN) nasal  1 Ampule  1 Ampule Topical Q12H  
 nitroglycerin (NITROSTAT) tablet 0.4 mg  0.4 mg SubLINGual PRN  
 sodium chloride (NS) flush 5-40 mL  5-40 mL IntraVENous Q8H  
 sodium chloride (NS) flush 5-40 mL  5-40 mL IntraVENous PRN  
 acetaminophen (TYLENOL) tablet 650 mg  650 mg Oral Q6H PRN Or  
 acetaminophen (TYLENOL) suppository 650 mg  650 mg Rectal Q6H PRN  polyethylene glycol (MIRALAX) packet 17 g  17 g Oral DAILY PRN  promethazine (PHENERGAN) tablet 12.5 mg  12.5 mg Oral Q6H PRN Or  
 ondansetron (ZOFRAN) injection 4 mg  4 mg IntraVENous Q6H PRN  
 glucose chewable tablet 16 g  4 Tab Oral PRN  
 glucagon (GLUCAGEN) injection 1 mg  1 mg IntraMUSCular PRN Nichelle Deleonop, [address] [facility] / [facility] [address] [place], [address] Phone - [phone]               Fax - [phone]

## 2021-04-20 NOTE — PROGRESS NOTES
0800 - Propofol off. Patient opening eyes spontaneously but no focusing with eyes. Grimacing. No command following. 1100 - TF turned off in preparation for possible extubation. 5mcg of propofol to help make patient more comfortable. Palliative has spoken with the patient's  who is on the way to be here for extubation. 1215  - F/U with picc team about picc line. 1350 - PICC team was unable to get a picc line in and have recommended an IJ. Will notify Dr. Baldemar Redd. 1500 - Left IJ placed by Dr. Baldemar Redd. Time out performed per protocol. 1600 - Patient extubated to bipap per order.  at bedside. Patient tolerating bipap at 99%. 1730 - IJ good for use, all lines changed per protocol. Patient

## 2021-04-20 NOTE — PROCEDURES
[facility] Procedure Note - Central Venous Access:  
Performed by Grzegorz Kramer MD 
 
Obtained informed Consent. Immediately prior to the procedure, the patient was reevaluated and found suitable for the planned procedure and any planned medications. Immediately prior to the procedure a time out was called to verify the correct patient, procedure, equipment, staff, and marking as appropriate. The site was prepped with ChloraPrep. Using Seldinger technique a Triple Lumen CVC was placed in the Left IJ via direct cannulation with 1 number of attempts for Monitoring, Blood Drawing and IV Access. Ultrasound Guidance was utilized. There was good blood return. The following complications were encountered: None. A follow-up chest x-ray was ordered post procedure. The procedure was tolerated well.

## 2021-04-20 NOTE — PROGRESS NOTES
1900: Bedside report received from off going day shift nurse Daniel RN. 
 
2000: Assessment performed and documented, see flow sheet for details. Pt is currently not following any commands, no eye opening to any stimulus, positive cough/gag/wihtdraw. +3 edema noted on upper and lower extremities. Pt is currently paced on tele. Pt is currently intubated, vent settings as follows; AC, R 18, , P 5, O2 40%. Lungs are clear with diminished bases. OGT in place with Nepro running at 20 mL, water flush of 50 mL every 6 hours. Munroe in place draining clear/yellow urine. Pt has right groin hematoma with 2 YOANA drains draining sanguinous fluid. Pt has a right swan/MAC line, XAVI midline, and Left radial Art line in place. Propofol at 20 mcg, KVO NS at 5 mL, Dobutamine at 7 mcg, Bumex at 1 mg, and blood products at 100 mL all currently infusing. 0000: Reassesment performed. Pt noted to have grimacing even without stimulus or pt care. Pt given Fentanyl for pain control with relief.  
 
0400: Reassessment performed. No changes from previous. Right groin dressing changed at this time. 0700: Bedside report given to oncoming nurse GURINDER Sanchez.

## 2021-04-20 NOTE — PROGRESS NOTES
[facility] Name: Keith Lopez : 1960 MRN: [id number] Date: 2021 Assessment:  
Brief patient summary: 
Ms Breezy Armendariz is a 62 yo female with a PMH of mitral regurgitation, CAD, HTN, HLD, paroxysmal atrial fibrillation/atrial flutter s/p AV node ablation, sick sinus syndrome s/p pacemaker, AV endocarditis, DM2, CKD4 (baseline Cr 2-2.2), neuropathy, L BKA, GERD, anemia, lymphedema, and gastric emma-en-y. As per chart review, she presented to [facility] as a transfer from [facility] on . At the OSH, she had been treated for chest pain, NSTEMI, and HF. Troponin peaked at 55 and downtrended to 28. TTE from [facility] showed EF 10-15% (prior normal EF). Repeat TTE pending at [facility]. At [facility], she has been seen an evaluated by Dr Tran Buckner. Of note, patient has allergies to contrast (noted in allergy list). Went for high risk PCI  and had LAD stent placed. She declined cardiac surgery. Her hospital stay has been complicated by arterial bleed in her right groin,  ARF and transaminitis. IT was determined that she is not a dialysis candidate.  
  
Vent day 7 
  
S/P: Procedure(s): LEFT AND RIGHT HEART CATH / CORONARY ANGIOGRAPHY Aortagram Abdominal W Runoff Active Problems: (By systems) PRINCIPLE DIAGNOSIS: 
1. NSTEMI with multivessel disease now with decompensated heart failure Management per cards Sp  high risk PCI  got stent to LAD · Echo from  :LV: Estimated LVEF is 25 - 30%. Normal cavity size. Mild concentric hypertrophy. Severely reduced systolic function. · LA: Dilated left atrium. · RV: Mildly dilated right ventricle. Reduced systolic function. Pacing wire present · MV: Mitral valve leaflet calcification. Mild mitral annular calcification. Moderate mitral valve regurgitation is present. · TV: Mild to moderate tricuspid valve regurgitation is present. · PA: Severe pulmonary hypertension.  Pulmonary arterial systolic pressure is 69 mmHg. 
 
PULMONARY: 
2. Acute respiratory failure - passed SBT this a.m. Plan for extubation once PICC line is placed. CARDIOVASCULAR/HEMODYNAMIC: 
3. Acute decompensated heart failure - on dobutamine. Off NE x 2 days. Diuresis per nephrology on bumex ggt. Lactate normal 
 
 
RENAL: 
4. oligouria - on bumex ggt. Not an HD candidate. Minimize IVF 
 
GI/NUTRITION: 
5. On tube feeds - will hold for extubation. Will need dobhoff and speech eval 
 
 
INFECTIOUS DISEASE 6. Previously started on empiric antibiotics for fever and leukocytosis. Remove old lines. Procal elevated. Afebrile x 24 hours now. Cultures pending. Micro: Antibiotics: 
vanc merrem HEME 
7. Anemia stable - no acute blood loss 8. DVT prophylaxis: 
 
NEURO 9. Sedation - hold for extuabtion 10. RASS goal:0 
 
 
 
OTHER 11. Discussed case with palliative care team on rounds. She is a high risk due to her volume overload and h/o heart failure for re-intubation however, she is not a long term candidate and did not \"want to live on machines\". Continue discussions with family 12. Insulin held due to NPO status - will restart once gastric access obtained. Past Medical History:  
 
 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  She also has no past medical history of Adverse effect of anesthesia, Difficult intubation, Malignant hyperthermia due to anesthesia, or Pseudocholinesterase deficiency. Past Surgical History:  
 
 has a past surgical history that includes pr laminectomy,cervical (1994); pr gastric bypass,obese<150cm emma-en-y (7/08); pr anesth,knee area surgery (1982 AND 1994); hx cervical fusion (1994); hx dilation and curettage (1992); hx carpal tunnel release; hx mastectomy (1998); ir insert tunl cvc w port over 5 years; pr trabeculoplasty by laser surgery; pr needle biopsy liver,w othr proc (8.21.07); us guide asp ovarian cyst abd approach (8.21.07); hx afib ablation; hx pacemaker (11/17/2020); hx mohs procedure (1995); pr abdomen surgery proc unlisted; hx orthopaedic (Right); hx mastectomy (Left); hx pacemaker; hx breast reconstruction (Bilateral); pr relocation of skin pocket for pacemaker (N/A, 4/24/2020); pr rmvl transvns pm eltrd 1 lead sys atr/ventr (N/A, 4/24/2020); pr rmvl transvns pm eltrd 1 lead sys atr/ventr (N/A, 4/27/2020); ir insert tunl cvc w/o port over 5 yr (5/4/2020); ir remove tunl cvad w/o port / pump (6/26/2020); ir insert tunl cvc w/o port over 5 yr (9/8/2020); hx amputation foot (Left, 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 Home Medications:  
 
Prior to Admission medications Medication Sig Start Date End Date Taking? Authorizing Provider  
cholecalciferol (Vitamin D3) (1000 Units /25 mcg) tablet Take 1,000 Units by mouth daily. Yes Provider, Historical  
vitamin a-vitamin c-vit e-min (OCUVITE) tablet Take 1 Tab by mouth daily. Yes Provider, Historical  
cyanocobalamin (Vitamin B-12) 500 mcg tablet Take 500 mcg by mouth daily. Yes Provider, Historical  
pantoprazole (PROTONIX) 40 mg tablet Take 1 Tab by mouth Before breakfast and dinner. 20  Yes Felicia Roach MD  
sertraline (ZOLOFT) 100 mg tablet Take 100 mg by mouth daily. 10/30/20  Yes Provider, Historical  
sertraline (ZOLOFT) 25 mg tablet Take 25 mg by mouth daily. Take with 100 mg tablet every morning 10/6/20  Yes Provider, Historical  
busPIRone (BUSPAR) 10 mg tablet TAKE ONE TABLET BY MOUTH TWICE A DAY 19   Shila Lobo MD  
 
 
Allergies/Social/Family History: Allergies Allergen Reactions  Avapro [Irbesartan] Unable to Obtain  Bactrim [Sulfamethoxazole-Trimethoprim] Other (comments) Sore mouth  Contrast Agent [Iodine] Itching [address]  Morphine Nausea and Vomiting and Swelling  Penicillins Hives Did not tolerate cefepime 3/2020  Pravachol [Pravastatin] Nausea Only  Seafood [Shellfish Containing Products] Hives  Singulair [Montelukast] Other (comments)  
  palpitations  Ace Inhibitors Cough  Amlodipine Swelling  Captopril Cough  Carvedilol Diarrhea Social History Tobacco Use  Smoking status: Never Smoker  Smokeless tobacco: Never Used Substance Use Topics  Alcohol use: Not Currently Family History Problem Relation Age of Onset  Cancer Mother  Heart Disease Mother  Alcohol abuse Father  Diabetes Brother  Hypertension Brother Objective:  
Vital Signs: 
Visit Vitals BP (!) 114/48 Pulse 92 Temp 99.1 °F (37.3 °C) Resp 23 Ht 5' 10\" (1.778 m) Wt 122.3 kg (269 lb 10 oz) SpO2 98% BMI 38.69 kg/m² O2 Flow Rate (L/min): 2 l/min O2 Device: Endotracheal tube Temp (24hrs), Av.5 °F (36.9 °C), Min:98 °F (36.7 °C), Max:99.1 °F (37.3 °C) CVP (mmHg): 2 mmHg (21 0800) Intake/Output:  
 
Intake/Output Summary (Last 24 hours) at 2021 1428 Last data filed at 2021 1200 Gross per 24 hour Intake 2153.35 ml Output 2833.5 ml Net -680.15 ml Physical Exam:  
Physical Exam 
Constitutional:   
   Comments: Following simple commands HENT:  
   Head: Normocephalic. Comments: ett in place Mouth/Throat:  
   Mouth: Mucous membranes are dry. Cardiovascular:  
   Rate and Rhythm: Normal rate. Pulmonary:  
   Comments: diminished Abdominal:  
   General: Bowel sounds are normal. There is distension. Skin: 
   General: Skin is warm. Neurological:  
   Mental Status: She is alert. Sensory: Sensory deficit: none. I have examined the patient on this day 4/20/2021 and the above documented exam is accurate including the components that have been copied forward LABS AND  DATA: Personally reviewed Recent Labs  
  04/20/21 0433 04/19/21 
4042 WBC 23.8* 20.4* HGB 8.2* 7.1*  
HCT 24.7* 21.6*  
 152 Recent Labs  
  04/20/21 0433 04/19/21 
7347 * 131*  
K 3.3* 3.7  103 CO2 18* 20* BUN 35* 34* CREA 2.47* 2.48* * 159* CA 8.1* 7.9*  
MG 2.1 2.0 PHOS 4.7 4.5 Recent Labs  
  04/20/21 0433 04/19/21 
1083 AP 85 71  
TP 4.5* 4.3* ALB 1.9* 1.8*  
GLOB 2.6 2.5 Recent Labs  
  04/20/21 0433 04/19/21 
6035 INR 1.2* 1.2* PTP 12.4* 12.6* No results for input(s): PHI, PCO2I, PO2I, FIO2I in the last 72 hours. No results for input(s): CPK, CKMB, TROIQ, BNPP in the last 72 hours. Hemodynamics:  
PAP: PAP Systolic: 56 (91/43/00 5107) CO: CO (l/min): 4.7 l/min (04/19/21 0800) Wedge:   CI: CI (l/min/m2): 2 l/min/m2 (04/19/21 0800) CVP:  CVP (mmHg): 2 mmHg (04/13/21 0800) SVR:    
  PVR:    
 
Ventilator Settings: 
Mode Rate Tidal Volume Pressure FiO2 PEEP Spontaneous   450 ml  6 cm H2O 40 % 5 cm H20 Peak airway pressure: 12 cm H2O Minute ventilation: 6.54 l/min MEDS: Reviewed Chest X-Ray: CXR Results  (Last 48 hours) 04/20/21 0629  XR CHEST PORT Final result Impression:  No change. Narrative:  Clinical indication: Shortness of breath.   
   
Portable AP supine view of the chest is obtained, comparison April 19. Support  
devices are unchanged. Mild vascular prominence unchanged. Increased density at  
the left lung base likely pleural reaction also stable 04/19/21 0953  XR CHEST PORT Final result Impression:  Slightly improved patchy bilateral airspace disease. Narrative:  INDICATION: Intubated. Respiratory failure. COMPARISON: 4/15/2021 FINDINGS: Single AP portable view of the chest obtained at 9:39 AM demonstrates  
no change in position of the endotracheal tube or right internal jugular [place] catheter. Nasogastric tube extends into the upper abdomen. The  
cardiomediastinal silhouette is stable. Previously seen patchy bilateral  
airspace disease is slightly improved. No pneumothorax is seen. Multidisciplinary Rounds Completed:  Yes SPECIAL EQUIPMENT 
CRRT 
 
DISPOSITION Stay in ICU CRITICAL CARE CONSULTANT NOTE I had a in-person encounter with Lorenzo Oppenheim, reviewed and interpreted patient data including events, labs, images, vital signs, I/O's, and examined patient. I have discussed the case and the plan and management of the patient's care with the consulting services, the bedside nurses and the respiratory therapist.   
 
NOTE OF PERSONAL INVOLVEMENT IN CARE This patient is at high risk for sudden and clinically significant deterioration, which requires the highest level of preparedness to intervene urgently. I participated in the decision-making and personally managed or directed the management of the following life and organ supporting interventions that required my frequent assessment to treat or prevent imminent deterioration. I personally spent 50 minutes of critical care time. This is time spent at patient's bedside actively involved in patient care as well as the coordination of care and discussions with the patient's family. This does not include any procedural time which has been billed separately.  
 
Augusto Kendall Danna Martínez MD 
[facility] [phone] 
4/20/2021

## 2021-04-20 NOTE — PROGRESS NOTES
[address]  [phone] Cardiology Progress Note 4/20/2021 0935 AM  
 
Admit Date: 4/7/2021 Admit Diagnosis:  
Chest pain [R07.9] NSTEMI (non-ST elevated myocardial infarction) ([facility] [address] [I21.4] CHF (congestive heart failure) ([facility] [address] [I50.9] Subjective:  
 
Natali Murrieta is intubated and off sedation this am. Grimacing to pain. Waking up. Discussed plan with RN at patient bedside S/p PCI/ADDIS to mid LAD 4/13/2021. S/p right groin exploration/control of bleeding/evacuation of hematoma/placement of drains on 4/13/2021. Appears hgb has stabilized over weekend, YOANA's are still draining some. She did receive 1 unit of PRBC's yesterday, hgb 8.2, goal is between 8-9. The patient is on dobutamine at 7 mcg and Bumex gtt.  systolic at time of rounding Hold eliquis, continue ASA and plavix. Hold coreg. Appreciate vascular surgery involvement Visit Vitals BP (!) 114/48 Pulse 89 Temp 98.7 °F (37.1 °C) Resp 22 Ht 5' 10\" (1.778 m) Wt 122.3 kg (269 lb 10 oz) SpO2 100% BMI 38.69 kg/m² Current Facility-Administered Medications Medication Dose Route Frequency  chlorothiazide (DIURIL) 250 mg in sterile water (preservative free) 9 mL injection  250 mg IntraVENous Q12H  
 meropenem (MERREM) 500 mg in 0.9% sodium chloride (MBP/ADV) 50 mL MBP  0.5 g IntraVENous Q8H  
 vancomycin (VANCOCIN) 750 mg in 0.9% sodium chloride 250 mL (VIAL-MATE)  750 mg IntraVENous Q24H  Vancomycin - Pharmacy to Dose   Other Rx Dosing/Monitoring  0.9% sodium chloride infusion 250 mL  250 mL IntraVENous PRN  
 bumetanide (BUMEX) 0.25 mg/mL infusion  1 mg/hr IntraVENous CONTINUOUS  
 insulin lispro (HUMALOG) injection   SubCUTAneous Q6H  
 dextrose (D50W) injection syrg 12.5-25 g  12.5-25 g IntraVENous PRN  propofol (DIPRIVAN) 10 mg/mL infusion  0-50 mcg/kg/min IntraVENous TITRATE  chlorhexidine (ORAL CARE KIT) 0.12 % mouthwash 15 mL 15 mL Oral Q12H  clopidogreL (PLAVIX) tablet 75 mg  75 mg Oral DAILY  fentaNYL citrate (PF) injection 25 mcg  25 mcg IntraVENous Q1H PRN  
 aspirin chewable tablet 81 mg  81 mg Per G Tube DAILY  famotidine (PF) (PEPCID) 20 mg in 0.9% sodium chloride 10 mL injection  20 mg IntraVENous Q24H  
 sodium chloride (NS) flush 5-40 mL  5-40 mL IntraVENous Q8H  
 sodium chloride (NS) flush 5-40 mL  5-40 mL IntraVENous PRN  
 naloxone (NARCAN) injection 0.4 mg  0.4 mg IntraVENous PRN  
 heparin (porcine) 2,000 Units in 0.9% sodium chloride 500 mL Irrigation    PRN  
 balsam peru-castor oiL (VENELEX) ointment   Topical BID  atorvastatin (LIPITOR) tablet 10 mg  10 mg Oral DAILY  [Held by provider] apixaban (ELIQUIS) tablet 5 mg  5 mg Oral Q12H  
 magic mouthwash cpd (without sucralfate)  5 mL Oral TID PRN  
 DOBUTamine (DOBUTREX) 500 mg/250 mL (2,000 mcg/mL) infusion  0-10 mcg/kg/min IntraVENous TITRATE  [Held by provider] carvediloL (COREG) tablet 3.125 mg  3.125 mg Oral BID WITH MEALS  
 alcohol 62% (NOZIN) nasal  1 Ampule  1 Ampule Topical Q12H  
 nitroglycerin (NITROSTAT) tablet 0.4 mg  0.4 mg SubLINGual PRN  
 sodium chloride (NS) flush 5-40 mL  5-40 mL IntraVENous Q8H  
 sodium chloride (NS) flush 5-40 mL  5-40 mL IntraVENous PRN  
 acetaminophen (TYLENOL) tablet 650 mg  650 mg Oral Q6H PRN Or  
 acetaminophen (TYLENOL) suppository 650 mg  650 mg Rectal Q6H PRN  polyethylene glycol (MIRALAX) packet 17 g  17 g Oral DAILY PRN  promethazine (PHENERGAN) tablet 12.5 mg  12.5 mg Oral Q6H PRN Or  
 ondansetron (ZOFRAN) injection 4 mg  4 mg IntraVENous Q6H PRN  
 glucose chewable tablet 16 g  4 Tab Oral PRN  
 glucagon (GLUCAGEN) injection 1 mg  1 mg IntraMUSCular PRN Objective:  
  
Physical Exam: 
Gen: chronically ill appearing  female, ashen/yellow appearance intubated on SBT Abdomen: soft, non-tender. Bowel sounds hypoactive, obese Extremities: left leg BKA, right leg trace edema, discolored, large soft hematoma to groin. Two YOANA drains in place draining. Dorsalis pulse palpable. Heart: regular rate and rhythm, no murmur, S3/JVD Lungs: diminished and coarse throughout Neck: supple, symmetrical, trachea midline Neurologic: intubated, responding Data Review:  
Recent Labs  
  04/20/21 
0433 04/19/21 
8018 04/18/21 
0554 WBC 23.8* 20.4* 15.9* HGB 8.2* 7.1* 7.4* HCT 24.7* 21.6* 22.8*  
 152 150 Recent Labs  
  04/20/21 
0433 04/19/21 
5063 04/18/21 
0554 * 131* 131*  
K 3.3* 3.7 4.1  103 104 CO2 18* 20* 18* * 159* 174* BUN 35* 34* 34* CREA 2.47* 2.48* 2.44* CA 8.1* 7.9* 8.0*  
MG 2.1 2.0 2.1 PHOS 4.7 4.5 4.8* ALB 1.9* 1.8* 2.1* TBILI 1.7* 1.4* 1.9* ALT 7* 6* <6* INR 1.2* 1.2* 1.1 No results for input(s): TROIQ, CPK, CKMB in the last 72 hours. Intake/Output Summary (Last 24 hours) at 4/20/2021 1124 Last data filed at 4/20/2021 1000 Gross per 24 hour Intake 3025.21 ml Output 3178.5 ml Net -153.29 ml  
  
 
Cardiographics 
  Telemetry: Paced ECG: Paced, no acute changes Echocardiogram: 3/1/2021 · LV: Estimated LVEF is 50 - 55%. Visually measured ejection fraction. Normal cavity size. Upper normal wall thickness. Low normal systolic function. · MV: Mitral annular calcification. Mild to moderate mitral valve regurgitation is present. · TV: Mild to moderate tricuspid valve regurgitation is present. · PA: Pulmonary arterial systolic pressure is 44 mmHg. · Image quality for this study was technically difficult. New ECHO 4/7/2021:  
· LV: Estimated LVEF is 20 - 25%. Normal cavity size. Moderate concentric hypertrophy. Severely reduced systolic function. · RV: Dilated right ventricle. Mildly reduced systolic function. Pacing wire present · MV: Mitral valve non-specific thickening. Mild mitral annular calcification. Moderate to severe mitral valve regurgitation is present.  
· TV: Mild tricuspid valve regurgitation is present. · PA: Moderate pulmonary hypertension. Pulmonary arterial systolic pressure is 47 mmHg. · RA: Mildly dilated right atrium. Repeat echo 4/11/2021 on dobutamine: 
· LV: Estimated LVEF is 25 - 30%. Normal cavity size. Moderate concentric hypertrophy. Moderate-to-severely reduced systolic function. Pacemaker. . 
· LA: Moderately dilated left atrium. · RV: Dilated right ventricle. Borderline low systolic function. Pacing wire present · RA: Moderately dilated right atrium. · AV: Mild aortic valve focal thickening present. · MV: Mitral valve non-specific thickening. Mild mitral annular calcification. Mild to moderate mitral valve regurgitation is present. · TV: Mild tricuspid valve regurgitation is present. · PA: Moderate pulmonary hypertension. Pulmonary arterial systolic pressure is 52 mmHg. ECHO 4/16/2021: 
· LV: Estimated LVEF is 25 - 30%. Normal cavity size. Mild concentric hypertrophy. Severely reduced systolic function. · LA: Dilated left atrium. · RV: Mildly dilated right ventricle. Reduced systolic function. Pacing wire present · MV: Mitral valve leaflet calcification. Mild mitral annular calcification. Moderate mitral valve regurgitation is present. · TV: Mild to moderate tricuspid valve regurgitation is present. · PA: Severe pulmonary hypertension. Pulmonary arterial systolic pressure is 69 mmHg. CXRAY: \"Slightly improved patchy bilateral airspace disease\". Assessment:  
 
Active Problems: 
  Chest pain (11/23/2020) CHF (congestive heart failure) ([address] (4/7/2021) NSTEMI (non-ST elevated myocardial infarction) ([address] (4/7/2021) S/P cardiac cath (4/8/2021) Overview: 4/13/2021: s/p PCI/ADDIS to mid LAD x 1  
    4/8/2021: cardiac cath showed MVD. Mod/severe MR, elevated PA pressures DNR (do not resuscitate) discussion () Goals of care, counseling/discussion () Need for emotional support ()   Shortness of breath () Hypovolemic shock (HCC) () Cardiogenic shock (HCC) () Plan:  
Layton Hale is a 63 y.o. female with a PMH significant for chronic PAF, right arm lymphadema (s/p lymphectomy), bilateral mastectomy for breast CA, HTN. HLD, neuropathy, left BKA, SSS, DM II, CKD, Asthma, A-flutter, long term use of OAC, endocarditis  admitted for Chest pain [R07.9] NSTEMI (non-ST elevated myocardial infarction) ([facility] [address] [I21.4] CHF (congestive heart failure) ([facility] [address]    
 
 
NSTEMI: s/p cardiac cath which showed MVD, mod/severe MR, and PHTN, cardiogenic shock due to ischemic cardiomyopathy: 
Troponin peaked at 55, trended down to 28, no further trending necessary. Cardiac index improved from 1.1- Mixed venous oxygen saturation is 59% and cardiac index 2.0 on rounds today. PA pressures in the 20s over high teens. Jose M Land has been in 1 week tomorrow, noted WBC's are up, will DC line today after alternative IV access. Discussed with PICC team. Patient was Pan cultured, so far no growth in [facility] SYSTEM. Passed SBT, plan for extubation today. ECHO showed new onset ICM; EF 20-25%. Repeat on dobutamine 4/11/2021 showed EF of 25-30, mitral regurgitation reduced to mild to moderate. 4/16/2021-ECHO stable. Patient declined for cardiac surgery due to prohibitively high risk Patient underwent successful PCI/ADDIS to mid LAD 4/13/21 without need for impella support. Re-admitted to ICU after procedure. Developed hematoma overnight after sheath pull, taken to OR by vascular surgery. Hemodynamically unstable, started on multiple pressors, weaned over weekend. Remains only on dobutamine at 10 mcg at this time. Continue plavix, closely monitor YOANA drain outpt. Hold Eliquis Monitor H/H, hemoynamics, YOANA drain outpt. Record every hour. H/H stable today, received 1 unit of PRBC's yesterday. Goal between 8-9 Mild/mod. mitral regurgitation: 
ECHO on 4/11/2021 showed mild/mod MR Repeat echo from 4/16/2021 noted  
  
Acute on chronic systolic HF, with cardiogenic and hypovolemic shock, improved:  
Continue pressor support to help maintain hemodynamic stability 
+ fluid status now with all the colloid and fluids for her shock state, creatinine stable although RN reports low U.O. now on bumex gtt at 1mg/hr, Nephrology following. Added diuril noted. CXray 4/19 shows slightly improved airspace disease, CXray today is stable. Remains on dobutamine at 7 mcg, titration as tolerated. Parox A-fib s/p AV node ablation   
Coreg held with cardiogenic shock on inotropic support Hold eliquis Hypertension: controlled, actually a little low normal, sedation now off, improving Monitor closely. On bumex gtt, hold coreg Continue inotropic support  
  
PARAG on Chronic kidney disease stage IV : baseline 2-2.1 Renal function improved with dobutamine and improved cardiac output. Avoid nephrotoxic substances Follow BMP-remains relatively stable Nephrology following, diuresing  
  
DM II: 
Manage per internal medicine  
  
HLD:  
LDL 44 on 4/11/2021 Started low-dose statin due to three-vessel disease Noted his history of questionable nausea only with pravastatin in 2010, and that statin may have been held due to elevated LFTs, but LFTs are currently normal 
  
Anxiety:   
Continue zoloft -on hold  
  
Chronic pain: 
Management as per pulmonary critical care and internal medicine Fentanyl as needed Acute blood loss anemia:  
Has received 12 units PRBC's thus far- FFP (5), and platelets (1) Monitor H/H, transfuse as needed to keep hgb greater than 8-9 with systolic HF. Has been 7 range over weekend. Give 1 unit yesterday- goal 8-9 with low BP and low LVEF. Hemoglobin 8.2 this am.  
Monitor and document YOANA output q 1 hour, notify provider if increases Leukocytosis with low grade temp: 
Closely monitor, could be related to atelectasis marcio Cortes changed and pancultured, given vanc times 1 4/19/21 NO growth in [facility] SYSTEM after 16 hours Sputum showed 2+ gram negative rods, abx as per intensivist  
Plan for PICC line placement today Yvrose Sorenson, NP  
DNP,APRN,AG-ACNP-BC Patient seen and examined by me with the above nurse practitioner. I personally performed all components of the history, physical, and medical decision making and agree with the assessment and plan with minor modifications as noted. Today the patient was extubated. New left IJ placed and right IJ cordis and [place] to be removed. General PE Gen: Extubated, drowsy HEENT: 
PERRL, no carotid bruits or JVD Chest and Lung Exam  
Inspection: Accessory muscles - No use of accessory muscles in breathing. Auscultation:  
Breath sounds: -Generally decreased breath sounds, but no overt crackles Cardiovascular Inspection: Jugular vein - Bilateral - Inspection Normal.  
Palpation/Percussion:  
Apical Impulse: - Normal.  
Auscultation: Rhythm - Regular. Heart Sounds - S1 WNL and S2 WNL. No S3 or S4. Murmurs & Other Heart Sounds: Auscultation of the heart reveals - No Murmurs. Peripheral Vascular Upper Extremity: Inspection - Bilateral - No Cyanotic nailbeds or Digital clubbing. Lower Extremity:  
Palpation: Edema - Bilateral -diffuse edema. Right anterior thigh hematoma seems a bit softer Abdomen:   Soft, non-tender, bowel sounds are active. Neuro: Extubated, drowsy, opens eyes on command Extubated today. Needs significant diuresis. Great UOP- renal appreciated. BP stable on dobutamine only. Hemoglobin 8 today. Infection per critical care. Discussed with nurses, PICC team, pharmacy to minimize fluid intake, Dr. Lasha Conn, patient who was starting to wake up, and her  at the bedside. Greater than 30 minutes of critical care time spent at the bedside exclusive of procedures.

## 2021-04-20 NOTE — PROGRESS NOTES
04/20/21 0336 ABCDEF Bundle SBT Safety Screen Passed Yes Weaning Parameters Spontaneous Breathing Trial Complete Yes Resp Rate Observed 26 Ve 351  RSBI 56 SBT passed, patient left on spont. mode. Reid. well.

## 2021-04-21 NOTE — ROUTINE PROCESS
0700 - report from [employer] Throughout the day, we have tried her HFNC since 1300 - but it has been a few hours, and her MAP are lowering. Putting her back on bipap. Pt looks like she is very uncomfortable, Dobhoff placed, once placement verified she has liquid tylenol available. Trying to get her to wake up, but she doesn't wake enough to try anything PO. Coughs, but doesn't even get it to front of her mouth to be suctioned, and when you put a Tressia Jesús in her mouth she bites it. She lets go after several commands, but not immediately.

## 2021-04-21 NOTE — PROGRESS NOTES
1900: Bedside report received from off going day shift nurse Jose Medrano RN. 
 
2000: Assessment performed and documented. Pt is able to open eyes upon command, unable to nod her head to answer questions. Pt is able to follow some commands, and close her eyes tightly for yes/no questions. Positive cough, gag, and withdraws on all extremities. Pt is currently paced on tele, +3 edema to upper and lower extremities, right LE pulses heard via doppler. Pt has Left BKA, femoral pulse palpable. Pt is currently on BIPAP 5/10 at 40% FiO2. Lungs are coarse with rhonchi and bases are diminished. Pt has a weak congested cough that is productive. Bowel sounds are hypoactive, FMS in place with brown loose stool. Munroe remains in place with clear/yellow urine. Left IJ triple lumen, Left radial ART line, and Left upper arm midline all remain in place, Bumex at 1 mg, and Dobutamine at 7 mcg currently infusing. 2020: This RN performed mouth care with pt. Thick oral secretions noted deep in the back of the throat. After mouth care and replacing bipap, pt began to desat into the high 70s low 80s. Pt again orally suctioned. Bipap settings changed to increase FiO2 to 50% then 80% to maintain sats. NP Marci Burkitt also at the bedside at this time. Sats still high 70s low 80s. Pt then NT suctions by this RN. Large amount of thick white secretions removed at this time. Bipap replaced, O2 sat now 100%, Bipap settings remain 10/5. Per NP, leave pt at 100% FiO2 for recovery then slowly wean back down to original setting of 40%. 2050: Pt remains at 100% O2 sat on 100% FiO2. RT currently at bedside. FiO2 titrated back down to original level of 40% by RT. Pt continues to maintain O2 sat high 90s at this time. 0000: Reassessment completed. No changes from previous. Pt given Fentanyl PRN for pain. 0400: Reassessment completed, no changes from previous. 0700: [facility] [place] [facility] RN.

## 2021-04-21 NOTE — PROGRESS NOTES
[address]  [phone] Cardiology Progress Note 4/21/2021 0915 AM  
 
Admit Date: 4/7/2021 Admit Diagnosis:  
Chest pain [R07.9] NSTEMI (non-ST elevated myocardial infarction) ([facility] [address] [I21.4] CHF (congestive heart failure) ([facility] [address] [I50.9] Subjective:  
 
Brian Juarez is on bipap, extubated yesterday and off sedation this am. Grimacing to pain, open eyes to command. Waking up. Discussed plan with RN at patient bedside S/p PCI/ADDIS to mid LAD 4/13/2021. S/p right groin exploration/control of bleeding/evacuation of hematoma/placement of drains on 4/13/2021. Appears hgb has stabilized, YOANA's are still draining some. She did receive 1 unit of PRBC's on 4/19/2021, hgb 8.6, goal is between 8-9. The patient is on dobutamine at 5 mcg and Bumex gtt.  systolic at time of rounding Hold eliquis, continue ASA and plavix. Hold coreg. Appreciate vascular surgery involvement; H/H has been stable a few days, considering DVT prophylaxis with heparin SQ, started with ok of Dr. Marcos Tian Visit Vitals BP (!) 125/58 Pulse 90 Temp 98.7 °F (37.1 °C) Resp 19 Ht 5' 10\" (1.778 m) Wt 126.2 kg (278 lb 3.5 oz) SpO2 100% BMI 39.92 kg/m² Current Facility-Administered Medications Medication Dose Route Frequency  potassium chloride 20 mEq in 50 ml IVPB  20 mEq IntraVENous ONCE  
 DOBUTamine (DOBUTREX) 4,000 mcg/mL in 0.9% sodium chloride 250 mL infusion  0-10 mcg/kg/min IntraVENous TITRATE  chlorothiazide (DIURIL) 250 mg in sterile water (preservative free) 9 mL injection  250 mg IntraVENous Q12H  
 DOBUTamine (DOBUTREX) 1,000 mg/250 mL (4,000 mcg/mL) infusion  0-10 mcg/kg/min IntraVENous TITRATE  meropenem (MERREM) 500 mg in 0.9% sodium chloride (MBP/ADV) 50 mL MBP  0.5 g IntraVENous Q8H  
 vancomycin (VANCOCIN) 750 mg in 0.9% sodium chloride 250 mL (VIAL-MATE)  750 mg IntraVENous Q24H  Vancomycin - Pharmacy to Dose   Other Rx Dosing/Monitoring  0.9% sodium chloride infusion 250 mL  250 mL IntraVENous PRN  
 bumetanide (BUMEX) 0.25 mg/mL infusion  1 mg/hr IntraVENous CONTINUOUS  
 insulin lispro (HUMALOG) injection   SubCUTAneous Q6H  
 dextrose (D50W) injection syrg 12.5-25 g  12.5-25 g IntraVENous PRN  
 clopidogreL (PLAVIX) tablet 75 mg  75 mg Oral DAILY  aspirin chewable tablet 81 mg  81 mg Per G Tube DAILY  famotidine (PF) (PEPCID) 20 mg in 0.9% sodium chloride 10 mL injection  20 mg IntraVENous Q24H  
 sodium chloride (NS) flush 5-40 mL  5-40 mL IntraVENous Q8H  
 sodium chloride (NS) flush 5-40 mL  5-40 mL IntraVENous PRN  
 naloxone (NARCAN) injection 0.4 mg  0.4 mg IntraVENous PRN  
 heparin (porcine) 2,000 Units in 0.9% sodium chloride 500 mL Irrigation    PRN  
 balsam peru-castor oiL (VENELEX) ointment   Topical BID  atorvastatin (LIPITOR) tablet 10 mg  10 mg Oral DAILY  [Held by provider] apixaban (ELIQUIS) tablet 5 mg  5 mg Oral Q12H  
 magic mouthwash cpd (without sucralfate)  5 mL Oral TID PRN  
 [Held by provider] carvediloL (COREG) tablet 3.125 mg  3.125 mg Oral BID WITH MEALS  
 alcohol 62% (NOZIN) nasal  1 Ampule  1 Ampule Topical Q12H  
 nitroglycerin (NITROSTAT) tablet 0.4 mg  0.4 mg SubLINGual PRN  
 sodium chloride (NS) flush 5-40 mL  5-40 mL IntraVENous Q8H  
 sodium chloride (NS) flush 5-40 mL  5-40 mL IntraVENous PRN  
 acetaminophen (TYLENOL) tablet 650 mg  650 mg Oral Q6H PRN Or  
 acetaminophen (TYLENOL) suppository 650 mg  650 mg Rectal Q6H PRN  polyethylene glycol (MIRALAX) packet 17 g  17 g Oral DAILY PRN  promethazine (PHENERGAN) tablet 12.5 mg  12.5 mg Oral Q6H PRN Or  
 ondansetron (ZOFRAN) injection 4 mg  4 mg IntraVENous Q6H PRN  
 glucose chewable tablet 16 g  4 Tab Oral PRN  
 glucagon (GLUCAGEN) injection 1 mg  1 mg IntraMUSCular PRN Objective:  
  
Physical Exam: 
Gen: chronically ill appearing  female, ashen/yellow appearance on bipap Abdomen: soft, non-tender. Bowel sounds hypoactive, obese, flexicele in place Extremities: left leg BKA, right leg trace edema, discolored, large soft hematoma to groin. Two YOANA drains in place draining. Dorsalis pulse palpable. Heart: regular rate and rhythm, no murmur, S3/JVD Lungs: diminished throughout Neck: supple, symmetrical, trachea midline Neurologic: drowsy, responding Data Review:  
Recent Labs  
  04/21/21 0442 04/20/21 0433 04/19/21 
7123 WBC 24.6* 23.8* 20.4* HGB 8.6* 8.2* 7.1*  
HCT 25.7* 24.7* 21.6*  
 159 152 Recent Labs  
  04/21/21 0442 04/20/21 0433 04/19/21 
5439 * 130* 131*  
K 3.1* 3.3* 3.7  101 103 CO2 18* 18* 20* * 184* 159* BUN 40* 35* 34* CREA 2.55* 2.47* 2.48* CA 8.6 8.1* 7.9*  
MG 2.1 2.1 2.0 PHOS 4.7 4.7 4.5 ALB 1.9* 1.9* 1.8* TBILI 2.0* 1.7* 1.4* ALT 9* 7* 6* INR 1.2* 1.2* 1.2* No results for input(s): TROIQ, CPK, CKMB in the last 72 hours. Intake/Output Summary (Last 24 hours) at 4/21/2021 1016 Last data filed at 4/21/2021 0900 Gross per 24 hour Intake 1076.65 ml Output 1840 ml Net -763.35 ml Cardiographics 
  Telemetry: Paced ECG: Paced, no acute changes Echocardiogram: 3/1/2021 · LV: Estimated LVEF is 50 - 55%. Visually measured ejection fraction. Normal cavity size. Upper normal wall thickness. Low normal systolic function. · MV: Mitral annular calcification. Mild to moderate mitral valve regurgitation is present. · TV: Mild to moderate tricuspid valve regurgitation is present. · PA: Pulmonary arterial systolic pressure is 44 mmHg. · Image quality for this study was technically difficult. New ECHO 4/7/2021:  
· LV: Estimated LVEF is 20 - 25%. Normal cavity size. Moderate concentric hypertrophy. Severely reduced systolic function. · RV: Dilated right ventricle. Mildly reduced systolic function. Pacing wire present · MV: Mitral valve non-specific thickening. Mild mitral annular calcification. Moderate to severe mitral valve regurgitation is present. · TV: Mild tricuspid valve regurgitation is present. · PA: Moderate pulmonary hypertension. Pulmonary arterial systolic pressure is 47 mmHg. · RA: Mildly dilated right atrium. Repeat echo 4/11/2021 on dobutamine: 
· LV: Estimated LVEF is 25 - 30%. Normal cavity size. Moderate concentric hypertrophy. Moderate-to-severely reduced systolic function. Pacemaker. . 
· LA: Moderately dilated left atrium. · RV: Dilated right ventricle. Borderline low systolic function. Pacing wire present · RA: Moderately dilated right atrium. · AV: Mild aortic valve focal thickening present. · MV: Mitral valve non-specific thickening. Mild mitral annular calcification. Mild to moderate mitral valve regurgitation is present. · TV: Mild tricuspid valve regurgitation is present. · PA: Moderate pulmonary hypertension. Pulmonary arterial systolic pressure is 52 mmHg. ECHO 4/16/2021: 
· LV: Estimated LVEF is 25 - 30%. Normal cavity size. Mild concentric hypertrophy. Severely reduced systolic function. · LA: Dilated left atrium. · RV: Mildly dilated right ventricle. Reduced systolic function. Pacing wire present · MV: Mitral valve leaflet calcification. Mild mitral annular calcification. Moderate mitral valve regurgitation is present. · TV: Mild to moderate tricuspid valve regurgitation is present. · PA: Severe pulmonary hypertension. Pulmonary arterial systolic pressure is 69 mmHg. CXRAY: \"increase in pulmonary edema pattern\". Assessment:  
 
Active Problems: 
  Chest pain (11/23/2020) CHF (congestive heart failure) ([address] (4/7/2021) NSTEMI (non-ST elevated myocardial infarction) ([address] (4/7/2021) S/P cardiac cath (4/8/2021) Overview: 4/13/2021: s/p PCI/ADDIS to mid LAD x 1  
    4/8/2021: cardiac cath showed MVD. Mod/severe MR, elevated PA pressures DNR (do not resuscitate) discussion () Goals of care, counseling/discussion () Need for emotional support () Shortness of breath () Hypovolemic shock (HCC) () Cardiogenic shock (HCC) () Plan:  
Erin Bhatt is a 63 y.o. female with a PMH significant for chronic PAF, right arm lymphadema (s/p lymphectomy), bilateral mastectomy for breast CA, HTN. HLD, neuropathy, left BKA, SSS, DM II, CKD, Asthma, A-flutter, long term use of OAC, endocarditis  admitted for Chest pain [R07.9] NSTEMI (non-ST elevated myocardial infarction) ([facility] [address] [I21.4] CHF (congestive heart failure) ([facility] [address]    
 
 
NSTEMI: s/p cardiac cath which showed MVD, mod/severe MR, and PHTN, cardiogenic shock (resolved) due to ischemic cardiomyopathy: 
Troponin peaked at 55, trended down to 28, no further trending necessary. Cardiac index improved from 1.1- Mixed venous oxygen saturation is 59% and cardiac index 2.0 on rounds today. PA pressures in the 20s over high teens. Bishop Search has been in 1 week tomorrow, noted WBC's continue to trend up, new left IJ placed, new buckley placed Monday 4/19/2021, BC no growth x 16 hours Passed SBT, extubated to bipap, tolerating ECHO showed new onset ICM; EF 20-25%. Repeat on dobutamine 4/11/2021 showed EF of 25-30, mitral regurgitation reduced to mild to moderate. 4/16/2021-ECHO stable. Patient declined for cardiac surgery due to prohibitively high risk Patient underwent successful PCI/ADDIS to mid LAD 4/13/21 without need for impella support. Re-admitted to ICU after procedure. Developed hematoma overnight after sheath pull, taken to OR by vascular surgery. Hemodynamically unstable, started on multiple pressors, weaned over weekend. Remains only on dobutamine at 5 mcg at this time. Continue plavix, closely monitor YOANA drain outpt. Hold Eliquis Monitor H/H, hemoynamics, YOANA drain outpt. Record every hour. H/H stable today at 8.6, received 1 unit of PRBC's 4/19/2021. Goal between 8-9 Start heparin  units q8h for DVT prophylaxis Mild/mod. mitral regurgitation: 
ECHO on 4/11/2021 showed mild/mod MR Repeat echo from 4/16/2021 noted  
  
Acute on chronic systolic HF, with cardiogenic and hypovolemic shock, resolved:  
+ fluid status now with all the colloid and fluids for her shock state, creatinine stable although low U.O. now on bumex gtt at 1mg/hr, Nephrology following. Added diuril BID with response. Patient is down 1L overnight, still 10L +. Zulma Allen shows worsening pulmonary edema pattern. Remains on dobutamine at 5 mcg, leave on for today. Parox A-fib s/p AV node ablation   
Coreg held with cardiogenic shock on inotropic support with dobutamine Hold eliquis Hypertension: controlled, actually a little low normal, sedation now off, stable Monitor closely. On bumex gtt, hold coreg Continue inotropic support  
  
PARAG on Chronic kidney disease stage IV : baseline 2-2.1 Renal function improved with dobutamine and improved cardiac output. Avoid nephrotoxic substances Follow BMP-remains relatively stable Nephrology following, chavez  
  
DM II: 
Manage per internal medicine  
  
HLD:  
LDL 44 on 4/11/2021 Started low-dose statin due to three-vessel disease Noted his history of questionable nausea only with pravastatin in 2010, and that statin may have been held due to elevated LFTs, but LFTs are currently normal 
  
Anxiety:   
Continue zoloft -on hold  
  
Chronic pain: 
Management as per pulmonary critical care and internal medicine Noted fentanyl was DC'd this am, intensivist will dose as needed as patient becomes more alert Acute blood loss anemia:  
Has received 12 units PRBC's thus far- FFP (5), and platelets (1) Monitor H/H, transfuse as needed to keep hgb greater than 8-9 with systolic HF Hemoglobin 8.6 this am.  
Monitor and document YOANA output q 1 hour, notify provider if increases Leukocytosis with low grade temp: 
Closely monitor, could be related to atelectasis Balbir JOHNSON'gem, marcio changed and pancultured, New line left IJ No growth in [facility] after 12 hours Sputum showed 2+ gram negative rods, abx as per intensivist  
 
Melisa Dwyer, MARCEL  
DNP,APRN,AG-ACNP-BC Patient seen and examined by me with the above nurse practitioner. I personally performed all components of the history, physical, and medical decision making and agree with the assessment and plan with minor modifications as noted. Today the patient is waking up. Hemoglobin stable. Discussed with Dr. Olivia Combs and he is okay with starting subcutaneous heparin for DVT prophylaxis now. Had good diuresis overnight with the addition of Diuril. General PE Gen:  NAD Mental Status - sleepy. General Appearance - Not in acute distress. HEENT: 
PERRL, no carotid bruits or JVD Chest and Lung Exam  
Inspection: Accessory muscles - No use of accessory muscles in breathing. Auscultation:  
Breath sounds: -Diminished bilaterally. Cardiovascular Inspection: Jugular vein - Bilateral - Inspection Normal.  
Palpation/Percussion:  
Apical Impulse: - Normal.  
Auscultation: Rhythm - Regular. Heart Sounds - S1 WNL and S2 WNL. No S3 or S4. Murmurs & Other Heart Sounds: Auscultation of the heart reveals - No Murmurs. Peripheral Vascular Upper Extremity: Inspection - Bilateral - No Cyanotic nailbeds or Digital clubbing. Lower Extremity:  
Palpation: Edema - Bilateral - No edema. Right groin bandages clean dry and intact, hematoma is not especially tight at this time. Abdomen:   Soft, non-tender, bowel sounds are active. Neuro: A&O times 3, CN and motor grossly WNL Today the patient is waking up. Hemoglobin stable. Discussed with Dr. Olivia Combs and he is okay with starting subcutaneous heparin for DVT prophylaxis now. Had good diuresis overnight with the addition of Diuril. Continue with diuresis. Initiate PT and OT.

## 2021-04-21 NOTE — PROGRESS NOTES
[facility] Name: Jeancarlos Hanna : 1960 MRN: [id number] Date: 2021 Assessment:  
Brief patient summary: 
Ms Edward Pennington is a 62 yo female with a PMH of mitral regurgitation, CAD, HTN, HLD, paroxysmal atrial fibrillation/atrial flutter s/p AV node ablation, sick sinus syndrome s/p pacemaker, AV endocarditis, DM2, CKD4 (baseline Cr 2-2.2), neuropathy, L BKA, GERD, anemia, lymphedema, and gastric emma-en-y. As per chart review, she presented to [address] [facility] as a transfer from [facility] on . At the OSH, she had been treated for chest pain, NSTEMI, and HF. Troponin peaked at 55 and downtrended to 28. TTE from [facility] showed EF 10-15% (prior normal EF). [facility] [address] [facility]. [facility] [address] [facility], she has been seen an evaluated by Dr Milvia Walsh. Of note, patient has allergies to contrast (noted in allergy list). Went for high risk PCI  and had LAD stent placed. She declined cardiac surgery. Her hospital stay has been complicated by arterial bleed in her right groin,  ARF and transaminitis. IT was determined that she is not a dialysis candidate.  
  
 
 extubated yesterday. On Bipap overnight.  
  
S/P: Procedure(s): LEFT AND RIGHT HEART CATH / CORONARY ANGIOGRAPHY Aortagram Abdominal W Runoff Active Problems: (By systems) PRINCIPLE DIAGNOSIS: 
1. NSTEMI with multivessel disease now with decompensated heart failure Management per cards Sp  high risk PCI  got stent to LAD · Echo from  :LV: Estimated LVEF is 25 - 30%. Normal cavity size. Mild concentric hypertrophy. Severely reduced systolic function. · LA: Dilated left atrium. · RV: Mildly dilated right ventricle. Reduced systolic function. Pacing wire present · MV: Mitral valve leaflet calcification. Mild mitral annular calcification. Moderate mitral valve regurgitation is present. · TV: Mild to moderate tricuspid valve regurgitation is present. · PA: Severe pulmonary hypertension. Pulmonary arterial systolic pressure is 69 mmHg. PULMONARY: 
Acute respiratory failure - passed SBT 4/20 and extubated to bipap. Remained on biapa overnight for comfort despite fairly normal abg. Will transition to NC today. CXR consistent with pulmonary edema and low lung volumes. Continue diuresis as able CARDIOVASCULAR/HEMODYNAMIC: 
2. Acute decompensated heart failure - on dobutamine. Off NE x 3 days. Diuresis per nephrology on bumex ggt. Lactate normal yesterday. Will trend periodically. RENAL: 
3. oligouria - on bumex ggt. Not an HD candidate. Minimize IVF 4. hypoNa - changed dobutamine to NS 
 
GI/NUTRITION: 
5. Tube feeds held yesterday for extubation / bipap. Will start trophic feeds through gastric tube if she remains off bipap. INFECTIOUS DISEASE Previously started on empiric antibiotics for fever and leukocytosis. Remove old lines. Procal elevated. Afebrile x 24 hours now. On merrem for h/o ESBL organisms. Will DC vanc. Micro: 
Sputum 4/19 prelim - GNR Blood cx 4/19, 4/20 NGTD Antibiotics: 
merrem HEME 
6. Anemia stable - no acute blood loss DVT prophylaxis: heparin NEURO 7. Lethargy despite being off sedation since yesterday. She did receive PRN fentanyl for generalized pain. Possibly uremic component. Will check lactate to ensure adequate perfusion. CO2 normal. Will hold fentanyl 8. Generalized pain - PRN tylenol. Will discuss restarting fentanyl once mental status / lethargy improves. RASS goal:0 
 
 
 
OTHER Discussed case with palliative care team on rounds. She is a high risk for re intubation due to her volume overload and h/o heart failure however, she is not a long term candidate and did not \"want to live on machines\". Continue discussions with family. Will continue PRN bipap for now. Discuss comfort measures given her discomfort and poor prognosis.   
 
12.  Insulin held due to NPO status - will restart once gastric access obtained. Past Medical History:  
 
 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 She also has no past medical history of Adverse effect of anesthesia, Difficult intubation, Malignant hyperthermia due to anesthesia, or Pseudocholinesterase deficiency.  
 
Past Surgical History:  
 
 has a past surgical history that includes pr laminectomy,cervical (1994); pr gastric bypass,obese<150cm emma-en-y (7/08); pr anesth,knee area surgery (1982 AND 1994); hx cervical fusion (1994); hx dilation and curettage (1992); hx carpal tunnel release; hx mastectomy (1998); ir insert tunl cvc w port over 5 years; pr trabeculoplasty by laser surgery; pr needle biopsy liver,w othr proc (8.21.07); us guide asp ovarian cyst abd approach (8.21.07); hx afib ablation; hx pacemaker (11/17/2020); hx mohs procedure (1995); pr abdomen surgery proc unlisted; hx orthopaedic (Right); hx mastectomy (Left); hx pacemaker; hx breast reconstruction (Bilateral); pr relocation of skin pocket for pacemaker (N/A, 4/24/2020); pr rmvl transvns pm eltrd 1 lead sys atr/ventr (N/A, 4/24/2020); pr rmvl transvns pm eltrd 1 lead sys atr/ventr (N/A, 4/27/2020); ir insert tunl cvc w/o port over 5 yr (5/4/2020); ir remove tunl cvad w/o port / pump (6/26/2020); ir insert tunl cvc w/o port over 5 yr (9/8/2020); hx amputation foot (Left, 04/2020); ir remove tunl cvad w/o port / pump (10/19/2020); pr tcat insj/rpl perm leadless pacemaker rv w/img (N/A, 11/17/2020); pr icar catheter ablation atrioventr node function (N/A, 11/17/2020); and central venous line insertion (4/20/2021). Home Medications:  
 
Prior to Admission medications Medication Sig Start Date End Date Taking? Authorizing Provider  
cholecalciferol (Vitamin D3) (1000 Units /25 mcg) tablet Take 1,000 Units by mouth daily. Yes Provider, Historical  
vitamin a-vitamin c-vit e-min (OCUVITE) tablet Take 1 Tab by mouth daily. Yes Provider, Historical  
cyanocobalamin (Vitamin B-12) 500 mcg tablet Take 500 mcg by mouth daily. Yes Provider, Historical  
pantoprazole (PROTONIX) 40 mg tablet Take 1 Tab by mouth Before breakfast and dinner. 12/7/20  Yes Chan Pereira MD  
sertraline (ZOLOFT) 100 mg tablet Take 100 mg by mouth daily. 10/30/20  Yes Provider, Historical  
sertraline (ZOLOFT) 25 mg tablet Take 25 mg by mouth daily. Take with 100 mg tablet every morning 10/6/20  Yes Provider, Historical  
busPIRone (BUSPAR) 10 mg tablet TAKE ONE TABLET BY MOUTH TWICE A DAY 5/24/19   Suly Driver MD  
 
 
Allergies/Social/Family History: Allergies Allergen Reactions  Avapro [Irbesartan] Unable to Obtain  Bactrim [Sulfamethoxazole-Trimethoprim] Other (comments) Sore mouth  Contrast Agent [Iodine] Itching [address]  Morphine Nausea and Vomiting and Swelling  Penicillins Hives Did not tolerate cefepime 3/2020  Pravachol [Pravastatin] Nausea Only  Seafood [Shellfish Containing Products] Hives  Singulair [Montelukast] Other (comments)  
  palpitations  Ace Inhibitors Cough  Amlodipine Swelling  Captopril Cough  Carvedilol Diarrhea Social History Tobacco Use  Smoking status: Never Smoker  Smokeless tobacco: Never Used Substance Use Topics  Alcohol use: Not Currently Family History Problem Relation Age of Onset  Cancer Mother  Heart Disease Mother  Alcohol abuse Father  Diabetes Brother  Hypertension Brother Objective:  
Vital Signs: 
Visit Vitals BP (!) 125/58 Pulse 92 Temp 98.8 °F (37.1 °C) Resp 21 Ht 5' 10\" (1.778 m) Wt 126.2 kg (278 lb 3.5 oz) SpO2 100% BMI 39.92 kg/m² O2 Flow Rate (L/min): 2 l/min O2 Device: BIPAP Temp (24hrs), Av.9 °F (37.2 °C), Min:98.3 °F (36.8 °C), Max:99.3 °F (37.4 °C) CVP (mmHg): 2 mmHg (21 0800) Intake/Output:  
 
Intake/Output Summary (Last 24 hours) at 2021 9987 Last data filed at 2021 0700 Gross per 24 hour Intake 1322.25 ml Output 1740 ml Net -417.75 ml Physical Exam:  
Physical Exam 
Constitutional:   
   Appearance: She is ill-appearing. Comments: Lethargic, opens eyes on command. Grimaces with touch HENT:  
   Head: Normocephalic. Mouth/Throat:  
   Mouth: Mucous membranes are dry. Cardiovascular:  
   Rate and Rhythm: Normal rate. Pulmonary:  
   Comments: Diminished bilateral 
Abdominal:  
   General: Bowel sounds are normal. There is distension. Skin: 
   General: Skin is warm. Neurological:  
   Sensory: Sensory deficit: none. I have examined the patient on this day 2021 and the above documented exam is accurate including the components that have been copied forward LABS AND  DATA: Personally reviewed Recent Labs  
  212 21 
6360 WBC 24.6* 23.8* HGB 8.6* 8.2* HCT 25.7* 24.7*  
 159 Recent Labs  
  212 21 
8786 * 130*  
K 3.1* 3.3*  
 101 CO2 18* 18* BUN 40* 35* CREA 2.55* 2.47* * 184* CA 8.6 8.1*  
MG 2.1 2.1 PHOS 4.7 4.7 Recent Labs  
  21 
6055 AP 88 85  
TP 4.8* 4.5* ALB 1.9* 1.9*  
GLOB 2.9 2.6 Recent Labs  
  21 
3805 INR 1.2* 1.2* PTP 12.1* 12.4* No results for input(s): PHI, PCO2I, PO2I, FIO2I in the last 72 hours. No results for input(s): CPK, CKMB, TROIQ, BNPP in the last 72 hours. Hemodynamics:  
PAP: PAP Systolic: 56 (55/98/97 9768) CO: CO (l/min): 4.7 l/min (04/19/21 0800) Wedge:   CI: CI (l/min/m2): 2 l/min/m2 (04/19/21 0800) CVP:  CVP (mmHg): 2 mmHg (04/13/21 0800) SVR:    
  PVR:    
 
Ventilator Settings: 
Mode Rate Tidal Volume Pressure FiO2 PEEP Spontaneous   450 ml  6 cm H2O 40 % 5 cm H20 Peak airway pressure: 11 cm H2O Minute ventilation: 9.5 l/min MEDS: Reviewed Chest X-Ray: CXR Results  (Last 48 hours) 04/20/21 1701  XR CHEST PORT Final result Impression:  1. Left IJ catheter are in satisfactory position. No pneumothorax 2. Increase in pulmonary edema pattern Narrative:  EXAM: XR CHEST PORT INDICATION: central line placement verification COMPARISON: 4/20/2021 at [address] [place] [address]  FINDINGS: A portable AP radiograph of the chest was obtained at 1648 hours. The  
patient is on a cardiac monitor. The ET tube and NG tube have been removed. Left IJ catheter overlies the superior vena cava. Right IJ [place]-Brunilda catheter has its  
tip in the right main pulmonary artery. The lungs demonstrate persistent opacity in left lower lobe consistent with  
atelectasis. There is moderate pulmonary edema. This has increased. 04/20/21 0629  XR CHEST PORT Final result Impression:  No change. Narrative:  Clinical indication: Shortness of breath. Portable AP supine view of the chest is obtained, comparison April 19. Support  
devices are unchanged. Mild vascular prominence unchanged. Increased density at  
the left lung base likely pleural reaction also stable 04/19/21 0953  XR CHEST PORT Final result Impression:  Slightly improved patchy bilateral airspace disease. Narrative:  INDICATION: Intubated. Respiratory failure.   
   
COMPARISON: 4/15/2021 FINDINGS: Single AP portable view of the chest obtained at 9:39 AM demonstrates  
no change in position of the endotracheal tube or right internal jugular [place]-Brunilda catheter. Nasogastric tube extends into the upper abdomen. The  
cardiomediastinal silhouette is stable. Previously seen patchy bilateral  
airspace disease is slightly improved. No pneumothorax is seen. Multidisciplinary Rounds Completed:  Yes SPECIAL EQUIPMENT 
CRRT 
 
DISPOSITION Stay in ICU CRITICAL CARE CONSULTANT NOTE I had a in-person encounter with Erin Bhatt, reviewed and interpreted patient data including events, labs, images, vital signs, I/O's, and examined patient. I have discussed the case and the plan and management of the patient's care with the consulting services, the bedside nurses and the respiratory therapist.   
 
NOTE OF PERSONAL INVOLVEMENT IN CARE This patient is at high risk for sudden and clinically significant deterioration, which requires the highest level of preparedness to intervene urgently. I participated in the decision-making and personally managed or directed the management of the following life and organ supporting interventions that required my frequent assessment to treat or prevent imminent deterioration. I personally spent 50 minutes of critical care time. This is time spent at patient's bedside actively involved in patient care as well as the coordination of care and discussions with the patient's family. This does not include any procedural time which has been billed separately. Dale Eastman MD 
Pulmonary/CCM [phone] 
[phone] 
4/21/2021

## 2021-04-21 NOTE — WOUND CARE
Wound care nurse consult: consult from CCU staff nurse for \" possible stage 1 scrum\". Patient is av magui ill appearing 62 y/o CF admitted for chest pain and acute on chronic CHF. She is s/p right groin bleed after cardiac catheterization, OR 4/13 for right groin exploration with repair of femoral artery, evacuation of hematoma, washout and placement of drains. Past Medical History:  
Diagnosis Date  Acquired lymphedema Right arm  Acute respiratory failure ([address] 12/19/2020  Arrhythmia  Asthma  Atrial fibrillation ([address] Dr. Leon Mustafa and Dr. Tony Noel [facility])  Cancer ([address] bilateral breast  
 Chronic kidney disease  Diabetes mellitus type II   
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 She is anasarcous from BUE's trunk and BLE's currently. On high flow NC O2, non responsive to questions and only grimaced when touched. Sacral coccyx area discolored a faint purple to pink erythema. It appears to be the start of end of life skin changes. Palliative Care is consulted and patient appears to have a poor chance for meaningful recovery. Recommend: Continue apply Venelex as ordered to sacrum/buttocks. Float heels Turn q 2hrs. Taylor Boyle RN, [facility] Recommend: Continue Venelex to sacrum

## 2021-04-21 NOTE — PROGRESS NOTES
Nephrology Progress Note Monroe Kiser  
 
[email or web address]  Phone - [phone] Patient: Michael Hernandez    YOB: 1960 Date- 4/21/2021   Admit Date: 4/7/2021 CC: Follow up for PARAG on CKD IMPRESSION & PLAN:  
· ACUTE KIDNEY INJURY - likely due to ATN,CRS from suppressed EF from recent MI and NOE. · ckd  3 LIKELY due to DM nephropathy and HTN nephrosclerosis- BL CR. 1.5-1.9 
· HYPONATREMIA- DUE TO CHF AND HYPOTONIC IVF D5W 
· Hypokalemia · Large tense painful hematoma R groin/thigh after R CFA access for cardiac cath · Shock- cardiogenic + hemorrhagic   RESOLVED · Edema- volume overload · Iron defi anemia · NSTEMI: s/p cardiac cath which showed MVD, mod/severe MR, and PHTN, cardiogenic shock due to ischemic cardiomyopathy- s/p PCI/ADDIS to mid LAD  on 4-13-21 
· afib - s/p AV node ablation · Anemia - CHRONIC- f/b DR. RUIZ 
· hypertension · H/O LEFT BKA · H/O Diabetic foot · Proteinuria likely due to DM nephropathy and HTN nephroscl- urine pr/cr 1.5 mg/dl PLAN- 
· Remain significantly hypervolemic · On bumex gtt 1mg/hour. UOP declined today · Will continue on Diuril 250 mg BID for now as scheduled dose. · Ordered for KCL 20 meq now times 1 dose. · Agree with switching carrier fluids to NS to help with hyponatremia · Will order for sodium bicarb 1 amp now · She is not a  long term candidate for RRT · DNR now. · D/w Dr Dulce Altman Subjective: Interval History:  
- remains on vent 
- Off all pressors - Remains on dobutamine 
- UOP lower today on bumex gtt/Diuril combination - Remains hypervolemic Objective:  
Vitals:  
 04/21/21 0700 04/21/21 0800 04/21/21 0823 04/21/21 0900 BP:      
Pulse: 91 92  90 Resp: 19 21 19 Temp:  98.7 °F (37.1 °C) SpO2: 100% 100% 100% 100% Weight:      
Height:      
  
04/20 0701 - 04/21 0700 In: 1393.3 [I.V.:1123.3] Out: 1970 [Urine:1405; Drains:565] Last 3 Recorded Weights in this Encounter 04/19/21 0400 04/20/21 0500 04/21/21 0400 Weight: 128.5 kg (283 lb 4.7 oz) 122.3 kg (269 lb 10 oz) 126.2 kg (278 lb 3.5 oz) Physical exam:  
GEN: intubated on vent NECK-no mass, ETTUBE 
RESP: clear b/l, no wheezing CVS: RRR,S1,S2 EXT: ++ Edema ,bka stump + NEURO:Can't access due to patient's current condition  : Munroe + Chart reviewed. Pertinent Notes reviewed. Data Review : 
Recent Labs  
  04/21/21 0442 04/20/21 
0433 04/19/21 
0575 * 130* 131*  
K 3.1* 3.3* 3.7  101 103 CO2 18* 18* 20* BUN 40* 35* 34* CREA 2.55* 2.47* 2.48* * 184* 159* CA 8.6 8.1* 7.9*  
MG 2.1 2.1 2.0 PHOS 4.7 4.7 4.5 Recent Labs  
  04/21/21 0442 04/20/21 
0433 04/19/21 
1269 WBC 24.6* 23.8* 20.4* HGB 8.6* 8.2* 7.1*  
HCT 25.7* 24.7* 21.6*  
 159 152 No results for input(s): FE, TIBC, PSAT, FERR in the last 72 hours. Medication list  reviewed Current Facility-Administered Medications Medication Dose Route Frequency  potassium chloride 20 mEq in 50 ml IVPB  20 mEq IntraVENous ONCE  
 DOBUTamine (DOBUTREX) 4,000 mcg/mL in 0.9% sodium chloride 250 mL infusion  0-10 mcg/kg/min IntraVENous TITRATE  chlorothiazide (DIURIL) 250 mg in sterile water (preservative free) 9 mL injection  250 mg IntraVENous Q12H  
 DOBUTamine (DOBUTREX) 1,000 mg/250 mL (4,000 mcg/mL) infusion  0-10 mcg/kg/min IntraVENous TITRATE  meropenem (MERREM) 500 mg in 0.9% sodium chloride (MBP/ADV) 50 mL MBP  0.5 g IntraVENous Q8H  
 vancomycin (VANCOCIN) 750 mg in 0.9% sodium chloride 250 mL (VIAL-MATE)  750 mg IntraVENous Q24H  Vancomycin - Pharmacy to Dose   Other Rx Dosing/Monitoring  0.9% sodium chloride infusion 250 mL  250 mL IntraVENous PRN  
 bumetanide (BUMEX) 0.25 mg/mL infusion  1 mg/hr IntraVENous CONTINUOUS  
 insulin lispro (HUMALOG) injection   SubCUTAneous Q6H  
 dextrose (D50W) injection syrg 12.5-25 g  12.5-25 g IntraVENous PRN  
 clopidogreL (PLAVIX) tablet 75 mg  75 mg Oral DAILY  aspirin chewable tablet 81 mg  81 mg Per G Tube DAILY  famotidine (PF) (PEPCID) 20 mg in 0.9% sodium chloride 10 mL injection  20 mg IntraVENous Q24H  
 sodium chloride (NS) flush 5-40 mL  5-40 mL IntraVENous Q8H  
 sodium chloride (NS) flush 5-40 mL  5-40 mL IntraVENous PRN  
 naloxone (NARCAN) injection 0.4 mg  0.4 mg IntraVENous PRN  
 heparin (porcine) 2,000 Units in 0.9% sodium chloride 500 mL Irrigation    PRN  
 balsam peru-castor oiL (VENELEX) ointment   Topical BID  atorvastatin (LIPITOR) tablet 10 mg  10 mg Oral DAILY  [Held by provider] apixaban (ELIQUIS) tablet 5 mg  5 mg Oral Q12H  
 magic mouthwash cpd (without sucralfate)  5 mL Oral TID PRN  
 [Held by provider] carvediloL (COREG) tablet 3.125 mg  3.125 mg Oral BID WITH MEALS  
 alcohol 62% (NOZIN) nasal  1 Ampule  1 Ampule Topical Q12H  
 nitroglycerin (NITROSTAT) tablet 0.4 mg  0.4 mg SubLINGual PRN  
 sodium chloride (NS) flush 5-40 mL  5-40 mL IntraVENous Q8H  
 sodium chloride (NS) flush 5-40 mL  5-40 mL IntraVENous PRN  
 acetaminophen (TYLENOL) tablet 650 mg  650 mg Oral Q6H PRN Or  
 acetaminophen (TYLENOL) suppository 650 mg  650 mg Rectal Q6H PRN  polyethylene glycol (MIRALAX) packet 17 g  17 g Oral DAILY PRN  promethazine (PHENERGAN) tablet 12.5 mg  12.5 mg Oral Q6H PRN Or  
 ondansetron (ZOFRAN) injection 4 mg  4 mg IntraVENous Q6H PRN  
 glucose chewable tablet 16 g  4 Tab Oral PRN  
 glucagon (GLUCAGEN) injection 1 mg  1 mg IntraMUSCular PRN Vasu Azar, [facility] [employer]  [facility] / [employer] [address] [place], [address] Phone - [phone]               Fax - [phone]

## 2021-04-21 NOTE — PROGRESS NOTES
Palliative Medicine Consult Don: [id number]) Patient Name: Mick Le YOB: 1960 Date of Initial Consult: 4/14/2021 Reason for Consult: Care Decisions Requesting Provider: Rafi Mccoy MD 
Primary Care Physician: Maira Cadet MD 
 
 SUMMARY:  
Mick Le is a 63 y.o. with a past history of a-fib, breast cancer, CKD, DMII, HTN, morbid obesity, osteoarthritis, pacemaker, and asthma, who was transferred on 4/7/2021 from [facility] with a diagnosis of Chest pain and acute on chronic CHF. Planned for cardiac cath +/- PCI She was a high risk PCI, but agreed to the risk as her risk of progressive heart failure and death was likely higher without cardiac cath She had a cath on 4/8 which showed multi vessel disease and moderate to severe MR with pulm htn. She also has new onset ICM with an EF of 20-25% She was transferred to the CCU- was treated for cardiogenic shock and started on inotropic support. Cardiac surgery evaluated her, but patient declined a surgical option due to it being too high risk. [place]-Brunilda catheter was placed on 4/9 She underwent PCI/ADDIS to mid LAD on 4/13- it was successful without need for impella support. That evening, after the sheath was pulled, she had an actively expanding hematoma to the right groin. Vascular surgery took her to the OR for a right groin exploration and hematoma evacuation. Drains were placed to help control bleeding She has had impressive output from the drains still this morning. She is being transfused Current medical issues leading to Palliative Medicine involvement include: goals of care in the setting of critical illness requiring a high risk cardiac procedure that resulted in complications PALLIATIVE DIAGNOSES:  
1. Goals of Care 2. Frailty 3. Debility 4. Shortness of breath 5. Lethargy 6. Need for emotional support PLAN:  
1.  Following for support- she is very tired today, still on bipap but tolerating it. Breathing is not labored and she is so lethargic she is not resisting it at all 2. Still on dobutamine for now, still has a lot of excess fluid on board but is diuresing 3. She appears uncomfortable, still grimacing, but is less alert today then she was on the vent yesterday, so there is concern that she is dwindling as we knew was a possibility yesterday 4. Will touch base with Mr Lani Dangelo when he comes to visit today for support, but will sit down with him tomorrow to talk about progress and ensure he has a good understanding of where she stands tomorrow 5. Initial consult note routed to primary continuity provider and/or primary health care team members 6. Communicated plan of care with: Palliative [facility] Team 
 
 GOALS OF CARE / TREATMENT PREFERENCES:  
 
GOALS OF CARE: 
Patient/Health Care Proxy Stated Goals: Rehabilitation TREATMENT PREFERENCES:  
Code Status: DNR Advance Care Planning: 
[x] The [facility] Interdisciplinary Team has updated the ACP Navigator with [place] and Patient Capacity Primary Decision Maker (Active): Adonis Winn - Spouse - [phone] Secondary Decision Maker: Anita Castro - Other Relative - [phone] Advance Care Planning 4/7/2021 Confirm Advance Directive Yes, on file Patient Would Like to Complete Advance Directive - Medical Interventions: Limited additional interventions Other Instructions: Other: As far as possible, the palliative care team has discussed with patient / health care proxy about goals of care / treatment preferences for patient. HISTORY:  
 
History obtained from: chart, family CHIEF COMPLAINT: none- intubated and sedated HPI/SUBJECTIVE: The patient is:  
[] Verbal and participatory [x] Non-participatory due to:  
condition Clinical Pain Assessment (nonverbal scale for severity on nonverbal patients):  
Clinical Pain Assessment Severity: 0 Activity (Movement): Lying quietly, normal position Duration: for how long has pt been experiencing pain (e.g., 2 days, 1 month, years) Frequency: how often pain is an issue (e.g., several times per day, once every few days, constant) FUNCTIONAL ASSESSMENT:  
 
Palliative Performance Scale (PPS): PPS: 30 
 
 
 PSYCHOSOCIAL/SPIRITUAL SCREENING:  
 
Palliative IDT has assessed this patient for cultural preferences / practices and a referral made as appropriate to needs (Cultural Services, Patient Advocacy, Ethics, etc.) Any spiritual / Latter day concerns: 
[] Yes /  [x] No 
 
Caregiver Burnout: 
[] Yes /  [x] No /  [] No Caregiver Present Anticipatory grief assessment:  
[x] Normal  / [] Maladaptive ESAS Anxiety: Anxiety: 0 
 
ESAS Depression: Depression: 0 REVIEW OF SYSTEMS:  
 
Positive and pertinent negative findings in ROS are noted above in HPI. The following systems were [x] reviewed / [] unable to be reviewed as noted in HPI Other findings are noted below. Systems: constitutional, ears/nose/mouth/throat, respiratory, gastrointestinal, genitourinary, musculoskeletal, integumentary, neurologic, psychiatric, endocrine. Positive findings noted below. Modified ESAS Completed by: provider Fatigue: 10    
Depression: 0 Pain: 0 Anxiety: 0 Nausea: 0 Dyspnea: 0 Stool Occurrence(s): 1 PHYSICAL EXAM:  
 
From RN flowsheet: 
Wt Readings from Last 3 Encounters:  
04/21/21 278 lb 3.5 oz (126.2 kg) 03/10/21 232 lb (105.2 kg) 02/08/21 264 lb (119.7 kg) Blood pressure (!) 125/58, pulse 93, temperature 98.7 °F (37.1 °C), resp. rate 20, height 5' 10\" (1.778 m), weight 278 lb 3.5 oz (126.2 kg), SpO2 100 %. Pain Scale 1: Adult Nonverbal Pain Scale Pain Intensity 1: 0 Pain Onset 1: acute Pain Location 1: Leg 
Pain Orientation 1: Right Pain Description 1: Aching Pain Intervention(s) 1: Medication (see MAR) Last bowel movement, if known: Constitutional: sedated ENMT: no nasal discharge, moist mucous membranes Respiratory: on vent Skin: warm, dry, surgical sites to groin, two drains Other: 
 
 
 HISTORY:  
 
Active Problems: 
  Chest pain (11/23/2020) CHF (congestive heart failure) ([address] (4/7/2021) NSTEMI (non-ST elevated myocardial infarction) ([address] (4/7/2021) S/P cardiac cath (4/8/2021) Overview: 4/13/2021: s/p PCI/ADDIS to mid LAD x 1  
    4/8/2021: cardiac cath showed MVD. Mod/severe MR, elevated PA pressures DNR (do not resuscitate) discussion () Goals of care, counseling/discussion () Need for emotional support () Shortness of breath () Hypovolemic shock (HCC) () Cardiogenic shock (HCC) () Past Medical History:  
Diagnosis Date  Acquired lymphedema Right arm  Acute respiratory failure ([address] 12/19/2020  Arrhythmia  Asthma  Atrial fibrillation ([address] Dr. Tejinder Reid and Dr. V[facility])  Cancer ([address] bilateral breast  
 Chronic kidney disease  Diabetes mellitus type II   
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  
Procedure Laterality Date [facility] VENOUS LINE INSERTION  4/20/2021  HX AFIB ABLATION    
 HX AMPUTATION FOOT Left 04/2020  HX BREAST RECONSTRUCTION Bilateral   
 HX CARPAL TUNNEL RELEASE [address] [id number] [address]  RIGHT MODIFIED RADICAL   
 HX MASTECTOMY Left   
 no lymphnode removal  
 HX MOHS PROCEDURES  1995  
 right  HX ORTHOPAEDIC Right   
 knee surgery  HX PACEMAKER  11/17/2020 Dr. Yuliet Rosales. Insertion of permanent pacemaker. AV node ablation  HX PACEMAKER    
 IR INSERT TUNL CVC W PORT OVER 5 YEARS    
 IR INSERT TUNL CVC W/O PORT OVER 5 YR  5/4/2020  IR INSERT TUNL CVC W/O PORT OVER 5 YR  9/8/2020  IR REMOVE TUNL CVAD W/O PORT / PUMP  6/26/2020  IR REMOVE TUNL CVAD W/O PORT / PUMP  10/19/2020  [place] ABDOMEN SURGERY PROC UNLISTED    
 gastric bypass [address] AND 1994  [place] GASTRIC BYPASS,[id number] [id number]  7/08  
 [facility]  [place] ICAR CATHETER ABLATION ATRIOVENTR NODE FUNCTION N/A 11/17/2020 ABLATION AV NODE performed by Alida Siegel MD at [address] CATH LAB [address]  [place] NEEDLE BIOPSY LIVER,W OTHR PROC  8.21.07  
 [place] RELOCATION OF SKIN POCKET FOR PACEMAKER N/A 4/24/2020 RELOCATE 2 [facility] performed by Judy Ross MD at [address] CATH LAB  [place] RMVL TRANSVNS PM ELTRD 1 LEAD SYS ATR/VENTR N/A 4/24/2020 Remove Pacemaker Dual Leads performed by Judy Ross MD at [facility] CARDI[facility] CATH LAB  [place] RMVL TRANSVNS PM ELTRD 1 LEAD SYS ATR/VENTR N/A 4/27/2020 REMOVE PACEMAKER DUAL LEADS performed by Judy Ross MD at [address] CATH LAB  [place] TCAT INSJ/RPL PERM LEADLESS PACEMAKER RV W/IMG N/A 11/17/2020 INSERT OR REPLACE TRANSCATH PPM LEADLESS performed by Alida Siegel MD at [address] CATH LAB  [place] TRABECULOPLASTY BY LASER SURGERY    
 US GUIDE ASP OVARIAN CYST ABD APPROACH  8.21.07  
 RIGHT Family History Problem Relation Age of Onset  Cancer Mother  Heart Disease Mother  Alcohol abuse Father  Diabetes Brother  Hypertension Brother History reviewed, no pertinent family history. Social History Tobacco Use  Smoking status: Never Smoker  Smokeless tobacco: Never Used Substance Use Topics  Alcohol use: Not Currently Allergies Allergen Reactions  Avapro [Irbesartan] Unable to Obtain  Bactrim [Sulfamethoxazole-Trimethoprim] Other (comments) Sore mouth  Contrast Agent [Iodine] Itching [address]  Morphine Nausea and Vomiting and Swelling  Penicillins Hives Did not tolerate cefepime 3/2020  Pravachol [Pravastatin] Nausea Only  Seafood [Shellfish Containing Products] Hives  Singulair [Montelukast] Other (comments)  
  palpitations  Ace Inhibitors Cough  Amlodipine Swelling  Captopril Cough  Carvedilol Diarrhea Current Facility-Administered Medications Medication Dose Route Frequency  DOBUTamine (DOBUTREX) 4,000 mcg/mL in 0.9% sodium chloride 250 mL infusion  0-10 mcg/kg/min IntraVENous TITRATE  chlorothiazide (DIURIL) 250 mg in sterile water (preservative free) 9 mL injection  250 mg IntraVENous Q12H  
 meropenem (MERREM) 500 mg in 0.9% sodium chloride (MBP/ADV) 50 mL MBP  0.5 g IntraVENous Q8H  
 vancomycin (VANCOCIN) 750 mg in 0.9% sodium chloride 250 mL (VIAL-MATE)  750 mg IntraVENous Q24H  Vancomycin - Pharmacy to Dose   Other Rx Dosing/Monitoring  0.9% sodium chloride infusion 250 mL  250 mL IntraVENous PRN  
 bumetanide (BUMEX) 0.25 mg/mL infusion  1 mg/hr IntraVENous CONTINUOUS  
 insulin lispro (HUMALOG) injection   SubCUTAneous Q6H  
 dextrose (D50W) injection syrg 12.5-25 g  12.5-25 g IntraVENous PRN  
 clopidogreL (PLAVIX) tablet 75 mg  75 mg Oral DAILY  aspirin chewable tablet 81 mg  81 mg Per G Tube DAILY  famotidine (PF) (PEPCID) 20 mg in 0.9% sodium chloride 10 mL injection  20 mg IntraVENous Q24H  
 sodium chloride (NS) flush 5-40 mL  5-40 mL IntraVENous Q8H  
 sodium chloride (NS) flush 5-40 mL  5-40 mL IntraVENous PRN  
 naloxone (NARCAN) injection 0.4 mg  0.4 mg IntraVENous PRN  
 heparin (porcine) 2,000 Units in 0.9% sodium chloride 500 mL Irrigation PRN  
 balsam peru-castor oiL (VENELEX) ointment   Topical BID  atorvastatin (LIPITOR) tablet 10 mg  10 mg Oral DAILY  [Held by provider] apixaban (ELIQUIS) tablet 5 mg  5 mg Oral Q12H  
 magic mouthwash cpd (without sucralfate)  5 mL Oral TID PRN  
 [Held by provider] carvediloL (COREG) tablet 3.125 mg  3.125 mg Oral BID WITH MEALS  
 alcohol 62% (NOZIN) nasal  1 Ampule  1 Ampule Topical Q12H  
 nitroglycerin (NITROSTAT) tablet 0.4 mg  0.4 mg SubLINGual PRN  
 sodium chloride (NS) flush 5-40 mL  5-40 mL IntraVENous Q8H  
 sodium chloride (NS) flush 5-40 mL  5-40 mL IntraVENous PRN  
 acetaminophen (TYLENOL) tablet 650 mg  650 mg Oral Q6H PRN Or  
 acetaminophen (TYLENOL) suppository 650 mg  650 mg Rectal Q6H PRN  polyethylene glycol (MIRALAX) packet 17 g  17 g Oral DAILY PRN  promethazine (PHENERGAN) tablet 12.5 mg  12.5 mg Oral Q6H PRN Or  
 ondansetron (ZOFRAN) injection 4 mg  4 mg IntraVENous Q6H PRN  
 glucose chewable tablet 16 g  4 Tab Oral PRN  
 glucagon (GLUCAGEN) injection 1 mg  1 mg IntraMUSCular PRN  
 
 
 
 LAB AND IMAGING FINDINGS:  
 
Lab Results Component Value Date/Time WBC 24.6 (H) 04/21/2021 04:42 AM  
 HGB 8.6 (L) 04/21/2021 04:42 AM  
 PLATELET 977 08/49/8212 04:42 AM  
 
Lab Results Component Value Date/Time Sodium 129 (L) 04/21/2021 04:42 AM  
 Potassium 3.1 (L) 04/21/2021 04:42 AM  
 Chloride 100 04/21/2021 04:42 AM  
 CO2 18 (L) 04/21/2021 04:42 AM  
 BUN 40 (H) 04/21/2021 04:42 AM  
 Creatinine 2.55 (H) 04/21/2021 04:42 AM  
 Calcium 8.6 04/21/2021 04:42 AM  
 Magnesium 2.1 04/21/2021 04:42 AM  
 Phosphorus 4.7 04/21/2021 04:42 AM  
  
Lab Results Component Value Date/Time Alk. phosphatase 88 04/21/2021 04:42 AM  
 Protein, total 4.8 (L) 04/21/2021 04:42 AM  
 Albumin 1.9 (L) 04/21/2021 04:42 AM  
 Globulin 2.9 04/21/2021 04:42 AM  
 
Lab Results Component Value Date/Time  INR 1.2 (H) 04/21/2021 04:42 AM  
 Prothrombin time 12.1 (H) 04/21/2021 04:42 AM  
 aPTT 40.3 (H) 04/14/2021 09:54 AM  
  
Lab Results Component Value Date/Time Iron 10 (L) 12/23/2020 05:29 AM  
 TIBC 301 12/23/2020 05:29 AM  
 Iron % saturation 3 (L) 12/23/2020 05:29 AM  
 Ferritin 17 (L) 12/23/2020 05:29 AM  
  
Lab Results Component Value Date/Time pH 7.34 (L) 04/20/2021 07:36 PM  
 PCO2 32 (L) 04/20/2021 07:36 PM  
 PO2 155 (H) 04/20/2021 07:36 PM  
 
No components found for: [place] Lab Results Component Value Date/Time CK 4,609 (H) 05/14/2020 05:15 AM  
 CK - MB 1.3 04/09/2014 11:00 AM  
  
 
 
   
 
Total time:   
Counseling / coordination time, spent as noted above: 
> 50% counseling / coordination?:  
 
Prolonged service was provided for  []30 min   []75 min in face to face time in the presence of the patient, spent as noted above. Time Start:  
Time End:  
Note: this can only be billed with 30737 (initial) or 06057 (follow up). If multiple start / stop times, list each separately.

## 2021-04-22 NOTE — PROGRESS NOTES
[facility] Name: Erin Bhatt : 1960 MRN: [id number] Date: 2021 Assessment:  
Brief patient summary: 
Ms Aroldo Acuna is a 62 yo female with a PMH of mitral regurgitation, CAD, HTN, HLD, paroxysmal atrial fibrillation/atrial flutter s/p AV node ablation, sick sinus syndrome s/p pacemaker, AV endocarditis, DM2, CKD4 (baseline Cr 2-2.2), neuropathy, L BKA, GERD, anemia, lymphedema, and gastric emma-en-y. As per chart review, she presented to [facility] as a transfer from [facility] on . At the OSH, she had been treated for chest pain, NSTEMI, and HF. Troponin peaked at 55 and downtrended to 28. TTE from [facility] showed EF 10-15% (prior normal EF). Repeat TTE pending at [facility]. At [facility], she has been seen an evaluated by Dr Kenneth Ribeiro. Of note, patient has allergies to contrast (noted in allergy list). Went for high risk PCI  and had LAD stent placed. She declined cardiac surgery. Her hospital stay has been complicated by arterial bleed in her right groin,  ARF and transaminitis. IT was determined that she is not a dialysis candidate.  
  
 
 extubated yesterday. On Bipap overnight.  Mildly labored on BiPAP. CXR with total collapse of L lung - likely mucus plugging. Discussed options and goals of care with pt's . He expressed wish to forgo bronchoscopy which he understands would require intubation to perform safely. With Palliative Care involvement, decision made to proceed with morphine infusion and discontinuation of BiPAP with focus on CMO.  
  
S/P: Procedure(s): LEFT AND RIGHT HEART CATH / CORONARY ANGIOGRAPHY Aortagram Abdominal W Runoff Active Problems: (By systems) PRINCIPLE DIAGNOSIS: 
1. NSTEMI with severe multivessel disease now with 2. Severe ischemic cardiomyopathy and decompensated heart failure S/P  high risk PCI  got stent to LAD · Echo from  :LV: Estimated LVEF is 25 - 30%. Normal cavity size.  Mild concentric hypertrophy. Severely reduced systolic function. · LA: Dilated left atrium. · RV: Mildly dilated right ventricle. Reduced systolic function. Pacing wire present · MV: Mitral valve leaflet calcification. Mild mitral annular calcification. Moderate mitral valve regurgitation is present. · TV: Mild to moderate tricuspid valve regurgitation is present. · PA: Severe pulmonary hypertension. Pulmonary arterial systolic pressure is 69 mmHg. PULMONARY: 
Acute hypoxemic respiratory failure Mucus plugging with L lung collapse RENAL: 
3. PARAG on CKD INFECTIOUS DISEASE Fever and leukocytosis. Procal elevated. On empiric antibiotic HEME 4. Post hemorrhagic anemia due to L femoral hematoma after L heart cath - no obvious ongoing blood loss DVT prophylaxis: heparin NEURO 5. Acute encephalopathy due to TME/ICU-acquired delirium OTHER 
DNR - likely comfort care measures only after family and friends able to visit Past Medical History:  
 
 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 She also has no past medical history of Adverse effect of anesthesia, Difficult intubation, Malignant hyperthermia due to anesthesia, or Pseudocholinesterase deficiency.  
 
Past Surgical History:  
 
 has a past surgical history that includes pr laminectomy,cervical (1994); pr gastric bypass,obese<150cm emma-en-y (7/08); pr anesth,knee area surgery ([address]); hx cervical fusion (1994); hx dilation and curettage (1992); hx carpal tunnel release; hx mastectomy (1998); ir insert tunl cvc w port over 5 years; pr trabeculoplasty by laser surgery; pr needle biopsy liver,w othr proc (8.21.07); us guide asp ovarian cyst abd approach (8.21.07); hx afib ablation; hx pacemaker (11/17/2020); hx mohs procedure (1995); pr abdomen surgery proc unlisted; hx orthopaedic (Right); hx mastectomy (Left); hx pacemaker; hx breast reconstruction (Bilateral); pr relocation of skin pocket for pacemaker (N/A, 4/24/2020); pr rmvl transvns pm eltrd 1 lead sys atr/ventr (N/A, 4/24/2020); pr rmvl transvns pm eltrd 1 lead sys atr/ventr (N/A, 4/27/2020); ir insert tunl cvc w/o port over 5 yr (5/4/2020); ir remove tunl cvad w/o port / pump (6/26/2020); ir insert tunl cvc w/o port over 5 yr (9/8/2020); hx amputation foot (Left, 04/2020); ir remove tunl cvad w/o port / pump (10/19/2020); pr tcat insj/rpl perm leadless pacemaker rv w/img (N/A, 11/17/2020); pr icar catheter ablation atrioventr node function (N/A, 11/17/2020); and central venous line insertion (4/20/2021). Home Medications:  
 
Prior to Admission medications Medication Sig Start Date End Date Taking? Authorizing Provider  
cholecalciferol (Vitamin D3) (1000 Units /25 mcg) tablet Take 1,000 Units by mouth daily. Yes Provider, Historical  
vitamin a-vitamin c-vit e-min (OCUVITE) tablet Take 1 Tab by mouth daily. Yes Provider, Historical  
cyanocobalamin (Vitamin B-12) 500 mcg tablet Take 500 mcg by mouth daily. Yes Provider, Historical  
pantoprazole (PROTONIX) 40 mg tablet Take 1 Tab by mouth Before breakfast and dinner. 12/7/20  Yes Yusuf Page MD  
sertraline (ZOLOFT) 100 mg tablet Take 100 mg by mouth daily.  10/30/20  Yes Provider, Historical  
sertraline (ZOLOFT) 25 mg tablet Take 25 mg by mouth daily. Take with 100 mg tablet every morning 10/6/20  Yes Provider, Historical  
busPIRone (BUSPAR) 10 mg tablet TAKE ONE TABLET BY MOUTH TWICE A DAY 19   Giovanna Sawyer MD  
 
 
Allergies/Social/Family History: Allergies Allergen Reactions  Avapro [Irbesartan] Unable to Obtain  Bactrim [Sulfamethoxazole-Trimethoprim] Other (comments) Sore mouth  Contrast Agent [Iodine] Itching [address]  Morphine Nausea and Vomiting and Swelling  Penicillins Hives Did not tolerate cefepime 3/2020  Pravachol [Pravastatin] Nausea Only  Seafood [Shellfish Containing Products] Hives  Singulair [Montelukast] Other (comments)  
  palpitations  Ace Inhibitors Cough  Amlodipine Swelling  Captopril Cough  Carvedilol Diarrhea Social History Tobacco Use  Smoking status: Never Smoker  Smokeless tobacco: Never Used Substance Use Topics  Alcohol use: Not Currently Family History Problem Relation Age of Onset  Cancer Mother  Heart Disease Mother  Alcohol abuse Father  Diabetes Brother  Hypertension Brother Objective:  
Vital Signs: 
Visit Vitals BP (!) 113/50 Pulse 92 Temp 99.5 °F (37.5 °C) Resp 27 Ht 5' 10\" (1.778 m) Wt 129.6 kg (285 lb 11.5 oz) SpO2 100% BMI 41.00 kg/m² O2 Flow Rate (L/min): 30 l/min O2 Device: BIPAP Temp (24hrs), Av.1 °F (37.3 °C), Min:98.7 °F (37.1 °C), Max:99.5 °F (37.5 °C) CVP (mmHg): 2 mmHg (21 0800) Intake/Output:  
 
Intake/Output Summary (Last 24 hours) at 2021 1555 Last data filed at 2021 1200 Gross per 24 hour Intake 531.83 ml Output 1138 ml Net -606.17 ml Physical Exam:  
Physical Exam 
Constitutional:   
   Appearance: She is ill-appearing. Comments: Lethargic, opens eyes on command. Grimaces with touch HENT:  
   Head: Normocephalic.   
   Mouth/Throat:  
   Mouth: Mucous membranes are dry.  
Cardiovascular:  
   Rate and Rhythm: Normal rate. Pulmonary:  
   Comments: Diminished bilateral 
Abdominal:  
   General: Bowel sounds are normal. There is distension. Skin: 
   General: Skin is warm. Neurological:  
   Sensory: Sensory deficit: none. I have examined the patient on this day 4/22/2021 and the above documented exam is accurate including the components that have been copied forward LABS AND  DATA: Personally reviewed Recent Labs  
  04/22/21 0318 04/21/21 
0226 WBC 27.7* 24.6* HGB 8.2* 8.6* HCT 24.8* 25.7*  
 170 Recent Labs  
  04/22/21 0318 04/21/21 
8323 * 129*  
K 3.0* 3.1*  
 100 CO2 18* 18* BUN 45* 40* CREA 2.76* 2.55* * 148* CA 8.6 8.6 MG 2.1 2.1 PHOS 5.1*  5.3* 4.7 Recent Labs  
  04/22/21 0318 04/21/21 
2721 AP 88 88  
TP 4.7* 4.8* ALB 1.9*  1.9* 1.9*  
GLOB 2.8 2.9 Recent Labs  
  04/22/21 0318 04/21/21 
7474 INR 1.2* 1.2* PTP 12.8* 12.1* No results for input(s): PHI, PCO2I, PO2I, FIO2I in the last 72 hours. No results for input(s): CPK, CKMB, TROIQ, BNPP in the last 72 hours. Hemodynamics:  
PAP: PAP Systolic: 56 (34/00/25 3166) CO: CO (l/min): 4.7 l/min (04/19/21 0800) Wedge:   CI: CI (l/min/m2): 2 l/min/m2 (04/19/21 0800) CVP:  CVP (mmHg): 2 mmHg (04/13/21 0800) SVR:    
  PVR:    
 
Ventilator Settings: 
Mode Rate Tidal Volume Pressure FiO2 PEEP Spontaneous   450 ml  6 cm H2O 40 % 5 cm H20 Peak airway pressure: 11 cm H2O Minute ventilation: 13.2 l/min MEDS: Reviewed Chest X-Ray: CXR Results  (Last 48 hours) 04/22/21 0528  XR CHEST PORT Final result Impression:  Interval significantly increased parenchymal opacity and effusion on the left. Increased interstitial and parenchymal opacity on the right. Narrative:  Clinical history: ett placement PNA INDICATION:   ett placement PNA COMPARISON: 4/21/2021 FINDINGS: AP portable upright view of the chest demonstrates a stable enlarged  
cardiopericardial silhouette. Interval increased left-sided layering pleural  
effusion. .Interval increased parenchymal opacity in the left hemithorax. Increased interstitial and parenchymal opacity on the right as well. NG tube in  
place. . Patient is on a cardiac monitor. 04/21/21 1210  XR CHEST PORT Final result Impression:  1. Decreasing bilateral pulmonary edema. Narrative:  EXAM: XR CHEST PORT INDICATION: pneumonia COMPARISON: 4/20/2021 FINDINGS: A portable AP radiograph of the chest was obtained at 1157 hours. The  
patient is on a cardiac monitor. The left IJ catheter overlies the SVC. Anabel-Brunilda catheter has been removed. Lungs demonstrate improved aeration with  
significant decrease in pulmonary edema pattern. There is mild residual  
pulmonary edema. Small pleural effusions are present left greater than right. There is atelectasis medially in left lower lobe which is unchanged. 04/20/21 1701  XR CHEST PORT Final result Impression:  1. Left IJ catheter are in satisfactory position. No pneumothorax 2. Increase in pulmonary edema pattern Narrative:  EXAM: XR CHEST PORT INDICATION: central line placement verification COMPARISON: 4/20/2021 at [address] [facility] FINDINGS: A portable AP radiograph of the chest was obtained at 1648 hours. The  
patient is on a cardiac monitor. The ET tube and NG tube have been removed. Left IJ catheter overlies the superior vena cava. Right IJ Anabel-Brunilda catheter has its  
tip in the right main pulmonary artery. The lungs demonstrate persistent opacity in left lower lobe consistent with  
atelectasis. There is moderate pulmonary edema. This has increased. Multidisciplinary Rounds Completed:  Yes SPECIAL EQUIPMENT 
CRRT 
 
DISPOSITION Stay in ICU CRITICAL CARE CONSULTANT NOTE I had a in-person encounter with Layton Hale, reviewed and interpreted patient data including events, labs, images, vital signs, I/O's, and examined patient. I have discussed the case and the plan and management of the patient's care with the consulting services, the bedside nurses and the respiratory therapist.   
 
NOTE OF PERSONAL INVOLVEMENT IN CARE This patient is at high risk for sudden and clinically significant deterioration, which requires the highest level of preparedness to intervene urgently. I participated in the decision-making and personally managed or directed the management of the following life and organ supporting interventions that required my frequent assessment to treat or prevent imminent deterioration. I personally spent 40 minutes of critical care time. This is time spent at patient's bedside actively involved in patient care as well as the coordination of care and discussions with the patient's family. This does not include any procedural time which has been billed separately. Alek Bates MD 
[phone] 4/22/2021

## 2021-04-22 NOTE — PROGRESS NOTES
0700 
Bedside shift change report received from [facility] (offgoing nurse). Assumed care of pt at this time. Report included the following information SBAR, Kardex, Intake/Output, MAR, Recent Results, Heart rhythm and Medications. 0000 MN assessment completed, see flowsheet. 0400 Assessment completed, see flowsheet. 4370 [facility] Report to GURINDER Sanchez.

## 2021-04-22 NOTE — PROGRESS NOTES
[address] [place] [address] [place], [address] [facility]  [phone] Cardiology Progress Note 4/22/2021 0915 AM  
 
Admit Date: 4/7/2021 Admit Diagnosis:  
Chest pain [R07.9] NSTEMI (non-ST elevated myocardial infarction) ([facility] [I21.4] CHF (congestive heart failure) ([facility] [I50.9] Subjective:  
 
Reza South is on bipap, extubated [place] pain, open eyes to command. Waking up. Discussed plan with RN at patient bedside S/p PCI/ADDIS to mid LAD 4/13/2021. S/p right groin exploration/control of bleeding/evacuation of hematoma/placement of drains on 4/13/2021. Appears hgb has stabilized, YOANA's are still draining some. She did receive 1 unit of PRBC's on 4/19/2021, hgb 8.2, goal is between 8-9. The patient is on dobutamine, between 5-7 mcg and Bumex gtt.  systolic at time of rounding Hold eliquis, continue ASA and plavix. Hold coreg. Appreciate vascular surgery involvement; H/H has been stable a few days, started on heparin SQ for DVT prophylaxis Still positive approximately 10 L Visit Vitals BP (!) 113/50 Pulse 91 Temp 99 °F (37.2 °C) Resp 24 Ht 5' 10\" (1.778 m) Wt 129.6 kg (285 lb 11.5 oz) SpO2 100% BMI 41.00 kg/m² Current Facility-Administered Medications Medication Dose Route Frequency  sodium bicarbonate tablet 650 mg  650 mg Oral TID  epoetin viet-epbx (RETACRIT) injection 20,000 Units  20,000 Units SubCUTAneous Q TUE, THU & SAT  DOBUTamine (DOBUTREX) 4,000 mcg/mL in 0.9% sodium chloride 250 mL infusion  0-10 mcg/kg/min IntraVENous TITRATE  chlorothiazide (DIURIL) 250 mg in sterile water (preservative free) 9 mL injection  250 mg IntraVENous Q12H  
 heparin (porcine) injection 5,000 Units  5,000 Units IntraVENous Q8H  
 acetaminophen (TYLENOL) solution 650 mg  650 mg Per NG tube Q4H PRN  
 meropenem (MERREM) 500 mg in 0.9% sodium chloride (MBP/ADV) 50 mL MBP  0.5 g IntraVENous Q8H  
 0.9% sodium chloride infusion 250 mL 250 mL IntraVENous PRN  
 bumetanide (BUMEX) 0.25 mg/mL infusion  1 mg/hr IntraVENous CONTINUOUS  
 insulin lispro (HUMALOG) injection   SubCUTAneous Q6H  
 dextrose (D50W) injection syrg 12.5-25 g  12.5-25 g IntraVENous PRN  
 clopidogreL (PLAVIX) tablet 75 mg  75 mg Oral DAILY  aspirin chewable tablet 81 mg  81 mg Per G Tube DAILY  famotidine (PF) (PEPCID) 20 mg in 0.9% sodium chloride 10 mL injection  20 mg IntraVENous Q24H  
 sodium chloride (NS) flush 5-40 mL  5-40 mL IntraVENous Q8H  
 sodium chloride (NS) flush 5-40 mL  5-40 mL IntraVENous PRN  
 naloxone (NARCAN) injection 0.4 mg  0.4 mg IntraVENous PRN  
 heparin (porcine) 2,000 Units in 0.9% sodium chloride 500 mL Irrigation    PRN  
 balsam peru-castor oiL (VENELEX) ointment   Topical BID  atorvastatin (LIPITOR) tablet 10 mg  10 mg Oral DAILY  [Held by provider] apixaban (ELIQUIS) tablet 5 mg  5 mg Oral Q12H  
 magic mouthwash cpd (without sucralfate)  5 mL Oral TID PRN  
 [Held by provider] carvediloL (COREG) tablet 3.125 mg  3.125 mg Oral BID WITH MEALS  
 alcohol 62% (NOZIN) nasal  1 Ampule  1 Ampule Topical Q12H  
 nitroglycerin (NITROSTAT) tablet 0.4 mg  0.4 mg SubLINGual PRN  
 sodium chloride (NS) flush 5-40 mL  5-40 mL IntraVENous Q8H  
 sodium chloride (NS) flush 5-40 mL  5-40 mL IntraVENous PRN  
 acetaminophen (TYLENOL) tablet 650 mg  650 mg Oral Q6H PRN Or  
 acetaminophen (TYLENOL) suppository 650 mg  650 mg Rectal Q6H PRN  polyethylene glycol (MIRALAX) packet 17 g  17 g Oral DAILY PRN  promethazine (PHENERGAN) tablet 12.5 mg  12.5 mg Oral Q6H PRN Or  
 ondansetron (ZOFRAN) injection 4 mg  4 mg IntraVENous Q6H PRN  
 glucose chewable tablet 16 g  4 Tab Oral PRN  
 glucagon (GLUCAGEN) injection 1 mg  1 mg IntraMUSCular PRN Objective:  
  
Physical Exam: 
Gen: chronically ill appearing  female, ashen/yellow appearance on bipap Abdomen: soft, non-tender.  Bowel sounds hypoactive, obese, flexicele in place Extremities: left leg BKA, right leg trace edema, discolored, large soft hematoma to groin. Two YOANA drains in place draining. Dorsalis pulse palpable. Heart: regular rate and rhythm, no murmur, S3/JVD Lungs: diminished throughout Neck: supple, symmetrical, trachea midline Neurologic: drowsy, responding Data Review:  
Recent Labs  
  04/22/21 0318 04/21/21 0442 04/20/21 
3275 WBC 27.7* 24.6* 23.8* HGB 8.2* 8.6* 8.2* HCT 24.8* 25.7* 24.7*  
 170 159 Recent Labs  
  04/22/21 0318 04/21/21 0442 04/20/21 
1908 * 129* 130*  
K 3.0* 3.1* 3.3*  
 100 101 CO2 18* 18* 18* * 148* 184* BUN 45* 40* 35* CREA 2.76* 2.55* 2.47* CA 8.6 8.6 8.1*  
MG 2.1 2.1 2.1 PHOS 5.1*  5.3* 4.7 4.7 ALB 1.9*  1.9* 1.9* 1.9* TBILI 1.8* 2.0* 1.7* ALT 8* 9* 7* INR 1.2* 1.2* 1.2* No results for input(s): TROIQ, CPK, CKMB in the last 72 hours. Intake/Output Summary (Last 24 hours) at 4/22/2021 1001 Last data filed at 4/22/2021 0630 Gross per 24 hour Intake 225.76 ml Output 1183 ml Net -957.24 ml Cardiographics 
  Telemetry: Paced ECG: Paced, no acute changes Echocardiogram: 3/1/2021 · LV: Estimated LVEF is 50 - 55%. Visually measured ejection fraction. Normal cavity size. Upper normal wall thickness. Low normal systolic function. · MV: Mitral annular calcification. Mild to moderate mitral valve regurgitation is present. · TV: Mild to moderate tricuspid valve regurgitation is present. · PA: Pulmonary arterial systolic pressure is 44 mmHg. · Image quality for this study was technically difficult. New ECHO 4/7/2021:  
· LV: Estimated LVEF is 20 - 25%. Normal cavity size. Moderate concentric hypertrophy. Severely reduced systolic function. · RV: Dilated right ventricle. Mildly reduced systolic function. Pacing wire present · MV: Mitral valve non-specific thickening. Mild mitral annular calcification. Moderate to severe mitral valve regurgitation is present. · TV: Mild tricuspid valve regurgitation is present. · PA: Moderate pulmonary hypertension. Pulmonary arterial systolic pressure is 47 mmHg. · RA: Mildly dilated right atrium. Repeat echo 4/11/2021 on dobutamine: 
· LV: Estimated LVEF is 25 - 30%. Normal cavity size. Moderate concentric hypertrophy. Moderate-to-severely reduced systolic function. Pacemaker. . 
· LA: Moderately dilated left atrium. · RV: Dilated right ventricle. Borderline low systolic function. Pacing wire present · RA: Moderately dilated right atrium. · AV: Mild aortic valve focal thickening present. · MV: Mitral valve non-specific thickening. Mild mitral annular calcification. Mild to moderate mitral valve regurgitation is present. · TV: Mild tricuspid valve regurgitation is present. · PA: Moderate pulmonary hypertension. Pulmonary arterial systolic pressure is 52 mmHg. ECHO 4/16/2021: 
· LV: Estimated LVEF is 25 - 30%. Normal cavity size. Mild concentric hypertrophy. Severely reduced systolic function. · LA: Dilated left atrium. · RV: Mildly dilated right ventricle. Reduced systolic function. Pacing wire present · MV: Mitral valve leaflet calcification. Mild mitral annular calcification. Moderate mitral valve regurgitation is present. · TV: Mild to moderate tricuspid valve regurgitation is present. · PA: Severe pulmonary hypertension. Pulmonary arterial systolic pressure is 69 mmHg. CXRAY: \"Interval significantly increased parenchymal opacity and effusion on the left. Increased interstitial and parenchymal opacity on the right. \" Assessment:  
 
Active Problems: 
  Chest pain (11/23/2020) CHF (congestive heart failure) ([address] (4/7/2021) NSTEMI (non-ST elevated myocardial infarction) ([address] (4/7/2021) S/P cardiac cath (4/8/2021) Overview: 4/13/2021: s/p PCI/ADDIS to mid LAD x 1  
    4/8/2021: cardiac cath showed MVD.  Mod/severe MR, elevated PA pressures DNR (do not resuscitate) discussion () Goals of care, counseling/discussion () Need for emotional support () Shortness of breath () Hypovolemic shock (HCC) () Cardiogenic shock (HCC) () Debility () Lethargy () Plan:  
Avery Avila is a 63 y.o. female with a PMH significant for chronic PAF, right arm lymphadema (s/p lymphectomy), bilateral mastectomy for breast CA, HTN. HLD, neuropathy, left BKA, SSS, DM II, CKD, Asthma, A-flutter, long term use of OAC, endocarditis  admitted for Chest pain [R07.9] NSTEMI (non-ST elevated myocardial infarction) ([facility] [I21.4] CHF (congestive heart failure) ([facility] [address]    
 
 
NSTEMI: s/p cardiac cath which showed MVD, mod/severe MR, and PHTN, cardiogenic shock (resolved) due to ischemic cardiomyopathy: 
4/19/2021, BC no growth x 2 days Passed SBT, extubated to bipap, tolerating ECHO showed new onset ICM; EF 20-25%. Repeat on dobutamine 4/11/2021 showed EF of 25-30, mitral regurgitation reduced to mild to moderate. 4/16/2021-ECHO stable. Patient declined for cardiac surgery due to prohibitively high risk Patient underwent successful PCI/ADDIS to mid LAD 4/13/21 without need for impella support. Re-admitted to ICU after procedure. Developed hematoma overnight after sheath pull, taken to OR by vascular surgery. Initially hemodynamically unstable, started on multiple pressors, weaned over weekend. Remains only on dobutamine at 5 mcg at this time. Continue plavix, closely monitor YOANA drain outpt. Hold Eliquis Monitor H/H, hemoynamics, YOANA drain outpt. Record every hour. H/H stable today at 8.6, received 1 unit of PRBC's 4/19/2021. Goal between 8-9 Start heparin  units q8h for DVT prophylaxis PT/OT to see for passive range of motion Mild/mod. mitral regurgitation: 
ECHO on 4/11/2021 showed mild/mod MR Repeat echo from 4/16/2021 noted  
  
Acute on chronic systolic HF, with cardiogenic and hypovolemic shock, resolved:  
+ fluid status now with all the colloid and fluids for her shock state, creatinine stable although low U.O. now on bumex gtt at 1mg/hr, Nephrology following. Added diuril BID with response. Patient is down fluid, still 10L +. Anna Duff shows worsening pulmonary edema pattern. Remains on dobutamine at 5-7 mcg, titrate as tolerated Parox A-fib s/p AV node ablation   
Coreg held with cardiogenic shock on inotropic support with dobutamine Hold eliquis Hypertension: controlled, actually a little low normal, sedation now off, stable Monitor closely. On bumex gtt, hold coreg Continue inotropic support  
  
PARAG on Chronic kidney disease stage IV : baseline 2-2.1 Renal function improved with dobutamine and improved cardiac output. Avoid nephrotoxic substances Follow BMP-remains relatively stable, up slightly today at 2.76 Nephrology following, alexing  
  
DM II: 
Manage per internal medicine  
  
HLD:  
LDL 44 on 4/11/2021 Started low-dose statin due to three-vessel disease Noted his history of questionable nausea only with pravastatin in 2010, and that statin may have been held due to elevated LFTs, but LFTs are currently normal 
  
Anxiety:   
Continue zoloft -on hold  
  
Chronic pain: 
Management as per pulmonary critical care and internal medicine Noted fentanyl was DC'd this am, intensivist will dose as needed as patient becomes more alert Acute blood loss anemia:  
Has received 12 units PRBC's thus far- FFP (5), and platelets (1) Monitor H/H, transfuse as needed to keep hgb greater than 8-9 with systolic HF Hemoglobin 8.6 this am.  
Monitor and document YOANA output q 1 hour, notify provider if increases Leukocytosis with low grade temp: WBC's trending up Closely monitor, could be related to atelectasis Balbir DC'd, buckley changed and pancultured, New line left IJ No growth in [facility] after 2 days Sputum showed 2+ gram negative rods, abx as per intensivist  
 
[id number] [facility] Cheryle Helms, NP  
DNP,APRN,AG-ACNP-BC Patient seen and examined by me with the above nurse practitioner. I personally performed all components of the history, physical, and medical decision making and agree with the assessment and plan with minor modifications as noted. Today the patient finally had negative fluid balance overnight and is starting to diurese better. Sleepy but arousable. Not much movement yet. General PE Gen:  NAD Mental Status - Alert. General Appearance - Not in acute distress. HEENT: 
PERRL, no carotid bruits or JVD Chest and Lung Exam  
Inspection: Accessory muscles - No use of accessory muscles in breathing. Auscultation:  
Breath sounds: - Normal.  
Cardiovascular Inspection: Jugular vein - Bilateral - Inspection Normal.  
Palpation/Percussion:  
Apical Impulse: - Normal.  
Auscultation: Rhythm - Regular. Heart Sounds - S1 WNL and S2 WNL. No S3 or S4. Murmurs & Other Heart Sounds: Auscultation of the heart reveals - No Murmurs. Peripheral Vascular Upper Extremity: Inspection - Bilateral - No Cyanotic nailbeds or Digital clubbing. Lower Extremity:  
Palpation: Edema - Bilateral - No edema. Abdomen:   Soft, non-tender, bowel sounds are active. Neuro: A&O times 3, CN and motor grossly WNL Continue to diurese as tolerated and work with PT and OT. Leukocytosis and possible infection as per critical care. Bleeding has stabilized, now on DVT prophylaxis. Continue aspirin and Plavix.

## 2021-04-22 NOTE — PROGRESS NOTES
Responded to request by Molly Degroot RN to reach out to patient's  Angel Cabrera who is struggling emotionally. He expressed concern to Breana Sheriff his worry that his wife may not have been saved; that she won't go to [facility] if she dies. He is struggling, too, with decision making regarding his wife's current condition and poor prognosis. Reviewed chart prior to reaching out to Mr. Chris Mascorro by telephone; Mrs. Chris Mascorro has been visited several times in the past by hospital ; it is noted she appreciated the support. Assured Mr. Chris Mascorro his wife is a woman of adriane. He will be coming to the hospital this afternoon and would like to speak with a  at that time. Consulted with Breana Sheriff after phone call.  will be available to support Mr. Chris Mascorro. Astrid Khan, MPS, [address], Staff  [facility]  Paging Service  [phone])

## 2021-04-22 NOTE — PROGRESS NOTES
Nephrology Progress Note [place] Kiser  
 
[email or web address]  Phone - [phone] Patient: Sandy Rodriguez    YOB: 1960 Date- 4/22/2021   Admit Date: 4/7/2021 CC: Follow up for PARAG on CKD IMPRESSION & PLAN:  
· PARAG  - likely due to ATN,CRS from suppressed EF from recent MI and NOE. · ckd  3 LIKELY due to DM nephropathy and HTN nephrosclerosis- BL CR. 1.5-1.9 
· HYPONATREMIA- DUE TO CHF AND HYPOTONIC IVF D5W 
· hypokalemia · Large tense painful hematoma R groin/thigh after R CFA access for cardiac cath · Shock- cardiogenic + hemorrhagic   RESOLVED · Edema- volume overload · Iron defi anemia · NSTEMI: s/p cardiac cath which showed MVD, mod/severe MR, and PHTN, cardiogenic shock due to ischemic cardiomyopathy- s/p PCI/ADDIS to mid LAD  on 4-13-21 
· afib - s/p AV node ablation · Anemia - CHRONIC- f/b DR. RUIZ 
· hypertension · H/O LEFT BKA · H/O Diabetic foot · Proteinuria likely due to DM nephropathy and HTN nephroscl- urine pr/cr 1.5 mg/dl PLAN- 
· CONTINUE BUMEX GTT · continue on Diuril 250 mg BID for now as scheduled dose. · Ordered for KCL 20 meq x  dose · Start po bicarb · She is not a  long term candidate for RRT · DNR now. · Follow bmp in am  
Subjective: Interval History:  
- she is on bipap Urine out put 1300 ml with bumex gtt and diuril 
k low Cr worse On dobutamine gtt - Remains hypervolemic Objective:  
Vitals:  
 04/22/21 0230 04/22/21 0245 04/22/21 0319 04/22/21 5949 BP:      
Pulse: 90 91 Resp: 23 22 Temp:      
SpO2: 100% 100% 100% 100% Weight:      
Height:      
  
04/21 0701 - 04/22 0700 In: 239.4 [I.V.:239.4] Out: 8027 [Urine:1030; ZSHPLJ:289] Last 3 Recorded Weights in this Encounter 04/19/21 0400 04/20/21 0500 04/21/21 0400 Weight: 128.5 kg (283 lb 4.7 oz) 122.3 kg (269 lb 10 oz) 126.2 kg (278 lb 3.5 oz) Physical exam:  
GEN: ON BIPAP NECK-no mass RESP: increased work of breathing, no wheezing CVS: RRR,S1,S2 EXT: ++ Edema , LEFT bka stump + NEURO:Can't access due to patient's current condition  : Munroe + Chart reviewed. Pertinent Notes reviewed. Data Review : 
Recent Labs  
  04/22/21 0318 04/21/21 0442 04/20/21 
5316 * 129* 130*  
K 3.0* 3.1* 3.3*  
 100 101 CO2 18* 18* 18* BUN 45* 40* 35* CREA 2.76* 2.55* 2.47* * 148* 184* CA 8.6 8.6 8.1*  
MG 2.1 2.1 2.1 PHOS 5.1*  5.3* 4.7 4.7 Recent Labs  
  04/22/21 0318 04/21/21 0442 04/20/21 
5175 WBC 27.7* 24.6* 23.8* HGB 8.2* 8.6* 8.2* HCT 24.8* 25.7* 24.7*  
 170 159 No results for input(s): FE, TIBC, PSAT, FERR in the last 72 hours. Medication list  reviewed Current Facility-Administered Medications Medication Dose Route Frequency  potassium chloride 20 mEq in 50 ml IVPB  20 mEq IntraVENous ONCE  potassium chloride 20 mEq in 50 ml IVPB  20 mEq IntraVENous ONCE  
 sodium bicarbonate tablet 650 mg  650 mg Oral TID  epoetin viet-epbx (RETACRIT) injection 20,000 Units  20,000 Units SubCUTAneous Q TUE, THU & SAT  iron sucrose (VENOFER) injection 200 mg  200 mg IntraVENous ONCE  
 DOBUTamine (DOBUTREX) 4,000 mcg/mL in 0.9% sodium chloride 250 mL infusion  0-10 mcg/kg/min IntraVENous TITRATE  chlorothiazide (DIURIL) 250 mg in sterile water (preservative free) 9 mL injection  250 mg IntraVENous Q12H  
 heparin (porcine) injection 5,000 Units  5,000 Units IntraVENous Q8H  
 acetaminophen (TYLENOL) solution 650 mg  650 mg Per NG tube Q4H PRN  
 meropenem (MERREM) 500 mg in 0.9% sodium chloride (MBP/ADV) 50 mL MBP  0.5 g IntraVENous Q8H  
 0.9% sodium chloride infusion 250 mL  250 mL IntraVENous PRN  
 bumetanide (BUMEX) 0.25 mg/mL infusion  1 mg/hr IntraVENous CONTINUOUS  
 insulin lispro (HUMALOG) injection   SubCUTAneous Q6H  
 dextrose (D50W) injection syrg 12.5-25 g  12.5-25 g IntraVENous PRN  
 clopidogreL (PLAVIX) tablet 75 mg 75 mg Oral DAILY  aspirin chewable tablet 81 mg  81 mg Per G Tube DAILY  famotidine (PF) (PEPCID) 20 mg in 0.9% sodium chloride 10 mL injection  20 mg IntraVENous Q24H  
 sodium chloride (NS) flush 5-40 mL  5-40 mL IntraVENous Q8H  
 sodium chloride (NS) flush 5-40 mL  5-40 mL IntraVENous PRN  
 naloxone (NARCAN) injection 0.4 mg  0.4 mg IntraVENous PRN  
 heparin (porcine) 2,000 Units in 0.9% sodium chloride 500 mL Irrigation    PRN  
 balsam peru-castor oiL (VENELEX) ointment   Topical BID  atorvastatin (LIPITOR) tablet 10 mg  10 mg Oral DAILY  [Held by provider] apixaban (ELIQUIS) tablet 5 mg  5 mg Oral Q12H  
 magic mouthwash cpd (without sucralfate)  5 mL Oral TID PRN  
 [Held by provider] carvediloL (COREG) tablet 3.125 mg  3.125 mg Oral BID WITH MEALS  
 alcohol 62% (NOZIN) nasal  1 Ampule  1 Ampule Topical Q12H  
 nitroglycerin (NITROSTAT) tablet 0.4 mg  0.4 mg SubLINGual PRN  
 sodium chloride (NS) flush 5-40 mL  5-40 mL IntraVENous Q8H  
 sodium chloride (NS) flush 5-40 mL  5-40 mL IntraVENous PRN  
 acetaminophen (TYLENOL) tablet 650 mg  650 mg Oral Q6H PRN Or  
 acetaminophen (TYLENOL) suppository 650 mg  650 mg Rectal Q6H PRN  polyethylene glycol (MIRALAX) packet 17 g  17 g Oral DAILY PRN  promethazine (PHENERGAN) tablet 12.5 mg  12.5 mg Oral Q6H PRN Or  
 ondansetron (ZOFRAN) injection 4 mg  4 mg IntraVENous Q6H PRN  
 glucose chewable tablet 16 g  4 Tab Oral PRN  
 glucagon (GLUCAGEN) injection 1 mg  1 mg IntraMUSCular PRN Jemal Lam, [employer] [employer]  [facility] Jennifer / MARGI AND TANK [facility] [place], [address] Phone - [phone]               Fax - [phone]

## 2021-04-22 NOTE — PROGRESS NOTES
1900 Bedside and Verbal shift change report given to Jayda De La Torre (oncoming nurse) by Nash Palencia (offgoing nurse). Report included the following information SBAR, Kardex, Procedure Summary, Intake/Output, MAR, Recent Results and Cardiac Rhythm [id number] pt assessed per flowsheet, minimally responsive, VSS with apenic resp noted. 2030 pt stopped breathing, suctioned for small amount of OT secretions. Spoke with Adriana Johnson. Apnea continues 2046 pt is without resp, without pulse. Adriana Johnson, NP in to pronounce pt. 
2055  and Adriana Johnson have spoken with  to notify him of pt's passing. He is coming in to see pt.

## 2021-04-22 NOTE — PROGRESS NOTES
Responded to page to CCU when patient's  arrived. Patient's brother was present when  arrived, but left soon after visit began. Patient's  Ирина Laureano was at bedside, often reaching out to touch her arm as he spoke about their life together. They would celebrate their 35th wedding anniversary later this year. They both longed for children, but she had more miscarriages. He shared he cried each time. Ирина Laureano seems to have a realistic understanding of Sharon's multiple medical challenges; not a candidate for dialysis, weak heart, on vent support and edema. Intellectually he knows her prognosis is dire, but emotionally struggling with letting her go. He expresses desire to know God's will before he makes a decision about withdrawal of care. He seems to need a little more time to make peace within before being ready for such withdrawal.  He shared he has been praying and many friends and family have been praying. At the heart of his concern is the fact that he never heard her verbally make a statement of adriane accepting [place] [employer]. Encouraged him to recall times she may have shown desire for relationship with God though no words may have been spoken. He seems to have family support from his siblings and her brother. Her ex-mother-in-law also seems to be supportive during this time. Provided pastoral presence, empathic listening and prayer. Introduced him to [facility] for continuity of care as this  will be out until Sunday. BURAK Ochoa, [facility], Staff  [facility]  Paging Service  [phone]

## 2021-04-22 NOTE — PROGRESS NOTES
Palliative Medicine Consult Don: [id number]) Patient Name: Jeancarlos Hanna YOB: 1960 Date of Initial Consult: 4/14/2021 Reason for Consult: Care Decisions Requesting Provider: Bright Orosco MD 
Primary Care Physician: Malachi Dover MD 
 
 SUMMARY:  
Jeancarlos Hanna is a 63 y.o. with a past history of a-fib, breast cancer, CKD, DMII, HTN, morbid obesity, osteoarthritis, pacemaker, and asthma, who was transferred on 4/7/2021 from [facility] with a diagnosis of Chest pain and acute on chronic CHF. Planned for cardiac cath +/- PCI She was a high risk PCI, but agreed to the risk as her risk of progressive heart failure and death was likely higher without cardiac cath She had a cath on 4/8 which showed multi vessel disease and moderate to severe MR with pulm htn. She also has new onset ICM with an EF of 20-25% She was transferred to the CCU- was treated for cardiogenic shock and started on inotropic support. Cardiac surgery evaluated her, but patient declined a surgical option due to it being too high risk. Mac[place]-Brunilda catheter was placed on 4/9 She underwent PCI/ADDIS to mid LAD on 4/13- it was successful without need for impella support. That evening, after the sheath was pulled, she had an actively expanding hematoma to the right groin. Vascular surgery took her to the OR for a right groin exploration and hematoma evacuation. Drains were placed to help control bleeding She has had impressive output from the drains still this morning. She is being transfused Current medical issues leading to Palliative Medicine involvement include: goals of care in the setting of critical illness requiring a high risk cardiac procedure that resulted in complications PALLIATIVE DIAGNOSES:  
1. Goals of Care 2. Frailty 3. Debility 4. Shortness of breath 5. Lethargy 6. Need for emotional support PLAN:  
1.  Spoke with  Thompson Jared at length over the phone- he is emotionally struggling as well as spiritually 2. He is wrestling with the decision to let her go- he has a lot of fears about her spirituality and is very scared that she is not saved, so will not go to [facility] when she passes. 3. He did share with me that if she was able to be awake, know people, and color, she would be OK with a life in a bed dependent on others. BUT she would not be OK with being incontinent. 4. We talked about the precarious nature of kidneys vs heart, and how this may be a lifelong breen for her as she was able to get dialysis outpatient 5. We talked about the subjective nature of \"suffering\" and how each individual defines this differently 6. He talked a lot about how his needs were much different then hers, but did not elaborate to what those were- other then he was struggling with reconciling them 7. He is not ready to stop aggressive care at this point, but he is very much struggling with the idea that she is suffering, and that he may loose her even if he is not ready. I have asked the  to follow up with him as well to help support him from that perspective 8. He also needs some guidance on  arrangement as he is very worried that he does not know how any of that works- so will ask the  to follow up with him at some point as well, just to alleviate his fears. 9. He may come today with family for support, and if they come I have offered to sit down with them to allow them all to process their emotions and have questions answered. Otherwise will follow peripherally 10. Initial consult note routed to primary continuity provider and/or primary health care team members 11. Communicated plan of care with: Palliative Yves LIANG [facility] Team 
 
Addendum:  Met with Mr Nolan Segura at the bedside after talking to pastoral care and . He is processing the events of the today, and is working through his fears.   Even though he is quite emotional, and scared, he is resolved to \"give her back her dignity\" and allow her to be comfortable and pass peacefully I sat with him for a bit as he showed me pictures of his wife The plan is for him to notify her friends and family to see if anyone wants to visit tonight and move to comfort tomorrow after he arrives at the hospital 
 
 GOALS OF CARE / TREATMENT PREFERENCES:  
 
GOALS OF CARE: 
Patient/Health Care Proxy Stated Goals: Rehabilitation TREATMENT PREFERENCES:  
Code Status: DNR Advance Care Planning: 
[x] [facility] Interdisciplinary Team has updated the ACP Navigator with [address] and Patient Capacity Primary Decision Maker (Active): Adonis Winn - Spouse - [phone] Secondary Decision Maker: Torin Pack - Other Relative - [phone] Advance Care Planning 4/7/2021 Confirm Advance Directive Yes, on file Patient Would Like to Complete Advance Directive - Medical Interventions: Limited additional interventions Other Instructions: Other: As far as possible, the palliative care team has discussed with patient / health care proxy about goals of care / treatment preferences for patient. HISTORY:  
 
History obtained from: chart, family CHIEF COMPLAINT: none- very tired, hard to arouse, on bipap HPI/SUBJECTIVE: The patient is:  
[] Verbal and participatory [x] Non-participatory due to:  
condition Clinical Pain Assessment (nonverbal scale for severity on nonverbal patients):  
Clinical Pain Assessment Severity: 0 Activity (Movement): Lying quietly, normal position Duration: for how long has pt been experiencing pain (e.g., 2 days, 1 month, years) Frequency: how often pain is an issue (e.g., several times per day, once every few days, constant) FUNCTIONAL ASSESSMENT:  
 
Palliative Performance Scale (PPS): PPS: 30 
 
 
 PSYCHOSOCIAL/SPIRITUAL SCREENING:  
 
Palliative IDT has assessed this patient for cultural preferences / practices and a referral made as appropriate to needs (Cultural Services, Patient Advocacy, Ethics, etc.) Any spiritual / Anglican concerns: 
[] Yes /  [x] No 
 
Caregiver Burnout: 
[] Yes /  [x] No /  [] No Caregiver Present Anticipatory grief assessment:  
[x] Normal  / [] Maladaptive ESAS Anxiety: Anxiety: 0 
 
ESAS Depression: Depression: 0 REVIEW OF SYSTEMS:  
 
Positive and pertinent negative findings in ROS are noted above in HPI. The following systems were [x] reviewed / [] unable to be reviewed as noted in HPI Other findings are noted below. Systems: constitutional, ears/nose/mouth/throat, respiratory, gastrointestinal, genitourinary, musculoskeletal, integumentary, neurologic, psychiatric, endocrine. Positive findings noted below. Modified ESAS Completed by: provider Fatigue: 10    
Depression: 0 Pain: 0 Anxiety: 0 Nausea: 0 Dyspnea: 0 Stool Occurrence(s): 1 PHYSICAL EXAM:  
 
From RN flowsheet: 
Wt Readings from Last 3 Encounters:  
04/22/21 285 lb 11.5 oz (129.6 kg) 03/10/21 232 lb (105.2 kg) 02/08/21 264 lb (119.7 kg) Blood pressure (!) 113/50, pulse 91, temperature 99 °F (37.2 °C), resp. rate 24, height 5' 10\" (1.778 m), weight 285 lb 11.5 oz (129.6 kg), SpO2 100 %. Pain Scale 1: Visual 
Pain Intensity 1: 0 Pain Onset 1: acute Pain Location 1: Leg 
Pain Orientation 1: Right Pain Description 1: Aching Pain Intervention(s) 1: Repositioned, Emotional support, Elevation Last bowel movement, if known:  
 
Constitutional: lethargic, barely responsive ENMT: no nasal discharge, moist mucous membranes Respiratory: on vent Skin: warm, dry, surgical sites to groin Other: 
 
 
 HISTORY:  
 
Active Problems: 
  Chest pain (11/23/2020) CHF (congestive heart failure) ([facility] (4/7/2021) NSTEMI (non-ST elevated myocardial infarction) [facility] (4/7/2021) S/P cardiac cath (4/8/2021)     Overview: 4/13/2021: s/p PCI/ADDIS to mid LAD x 1  
 4/8/2021: cardiac cath showed MVD. Mod/severe MR, elevated PA pressures DNR (do not resuscitate) discussion () Goals of care, counseling/discussion () Need for emotional support () Shortness of breath () Hypovolemic shock (HCC) () Cardiogenic shock (HCC) () Debility () Lethargy () Past Medical History:  
Diagnosis Date  Acquired lymphedema Right arm  Acute respiratory failure ([address] 12/19/2020  Arrhythmia  Asthma  Atrial fibrillation ([address] Dr. Gabriele Poon and Dr. Nicole alves [facility])  Cancer ([address] bilateral breast  
 Chronic kidney disease  Diabetes mellitus type II   
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  
Procedure Laterality Date [facility] VENOUS LINE INSERTION  4/20/2021  HX AFIB ABLATION    
 HX AMPUTATION FOOT Left 04/2020  HX BREAST RECONSTRUCTION Bilateral   
 HX CARPAL TUNNEL RELEASE [address] RIGHT MODIFIED RADICAL   
 HX MASTECTOMY Left   
 no lymphnode removal  
 HX MOHS PROCEDURES  1995  
 right  HX ORTHOPAEDIC Right   
 knee surgery  HX PACEMAKER  11/17/2020 Dr. Yuliet Rosales. Insertion of permanent pacemaker. AV node ablation  HX PACEMAKER    
 IR INSERT TUNL CVC W PORT OVER 5 YEARS    
 IR INSERT TUNL CVC W/O PORT OVER 5 YR  5/4/2020  IR INSERT TUNL CVC W/O PORT OVER 5 YR  9/8/2020  IR REMOVE TUNL CVAD W/O PORT / PUMP  6/26/2020  IR REMOVE TUNL CVAD W/O PORT / PUMP  10/19/2020  TX ABDOMEN SURGERY PROC UNLISTED    
 gastric bypass [address] AND 1994  TX GASTRIC BYPASS,OBESE<150CM SAW-EN-Y  7/08  
 [facility]  TX ICAR CATHETER ABLATION ATRIOVENTR NODE FUNCTION N/A 11/17/2020 ABLATION AV NODE performed by Beverly Key MD at [address] CATH LAB [address]  TX NEEDLE BIOPSY LIVER,W OTHR PROC  8.21.07  
 TX RELOCATION OF SKIN POCKET FOR PACEMAKER N/A 4/24/2020 RELOCATE 2 [facility] performed by Matt Haines MD at [address] [facility] CATH LAB  TX RMVL TRANSVNS PM ELTRD 1 LEAD SYS ATR/VENTR N/A 4/24/2020 Remove Pacemaker Dual Leads performed by Matt Haines MD at [facility] CARDIAC CATH LAB  TX RMVL TRANSVNS PM ELTRD 1 LEAD SYS ATR/VENTR N/A 4/27/2020 REMOVE PACEMAKER DUAL LEADS performed by Matt Haines MD at [address] [facility] CATH LAB  TX TCAT INSJ/RPL PERM LEADLESS PACEMAKER RV W/IMG N/A 11/17/2020 INSERT OR REPLACE TRANSCATH PPM LEADLESS performed by Beverly Key MD at [address] CATH LAB  TX TRABECULOPLASTY BY LASER SURGERY    
 US GUIDE ASP OVARIAN CYST ABD APPROACH  8.21.07  
 RIGHT Family History Problem Relation Age of Onset  Cancer Mother  Heart Disease Mother  Alcohol abuse Father  Diabetes Brother  Hypertension Brother History reviewed, no pertinent family history. Social History Tobacco Use  Smoking status: Never Smoker  Smokeless tobacco: Never Used Substance Use Topics  Alcohol use: Not Currently Allergies Allergen Reactions  Avapro [Irbesartan] Unable to Obtain  Bactrim [Sulfamethoxazole-Trimethoprim] Other (comments) Sore mouth  Contrast Agent [Iodine] Itching [address]  Morphine Nausea and Vomiting and Swelling  Penicillins Hives Did not tolerate cefepime 3/2020  Pravachol [Pravastatin] Nausea Only  Seafood [Shellfish Containing Products] Hives  Singulair [Montelukast] Other (comments)  
  palpitations  Ace Inhibitors Cough  Amlodipine Swelling  Captopril Cough  Carvedilol Diarrhea Current Facility-Administered Medications Medication Dose Route Frequency  sodium bicarbonate tablet 650 mg  650 mg Oral TID  epoetin viet-epbx (RETACRIT) injection 20,000 Units  20,000 Units SubCUTAneous Q TUE, THU & SAT  DOBUTamine (DOBUTREX) 4,000 mcg/mL in 0.9% sodium chloride 250 mL infusion  0-10 mcg/kg/min IntraVENous TITRATE  chlorothiazide (DIURIL) 250 mg in sterile water (preservative free) 9 mL injection  250 mg IntraVENous Q12H  
 heparin (porcine) injection 5,000 Units  5,000 Units IntraVENous Q8H  
 acetaminophen (TYLENOL) solution 650 mg  650 mg Per NG tube Q4H PRN  
 meropenem (MERREM) 500 mg in 0.9% sodium chloride (MBP/ADV) 50 mL MBP  0.5 g IntraVENous Q8H  
 0.9% sodium chloride infusion 250 mL  250 mL IntraVENous PRN  
 bumetanide (BUMEX) 0.25 mg/mL infusion  1 mg/hr IntraVENous CONTINUOUS  
 insulin lispro (HUMALOG) injection   SubCUTAneous Q6H  
 dextrose (D50W) injection syrg 12.5-25 g  12.5-25 g IntraVENous PRN  
 clopidogreL (PLAVIX) tablet 75 mg  75 mg Oral DAILY  aspirin chewable tablet 81 mg  81 mg Per G Tube DAILY  famotidine (PF) (PEPCID) 20 mg in 0.9% sodium chloride 10 mL injection  20 mg IntraVENous Q24H  
 sodium chloride (NS) flush 5-40 mL  5-40 mL IntraVENous Q8H  
 sodium chloride (NS) flush 5-40 mL  5-40 mL IntraVENous PRN  
 naloxone (NARCAN) injection 0.4 mg  0.4 mg IntraVENous PRN  
 heparin (porcine) 2,000 Units in 0.9% sodium chloride 500 mL Irrigation    PRN  
 balsam peru-castor oiL (VENELEX) ointment   Topical BID  atorvastatin (LIPITOR) tablet 10 mg  10 mg Oral DAILY  [Held by provider] apixaban (ELIQUIS) tablet 5 mg  5 mg Oral Q12H  
 magic mouthwash cpd (without sucralfate)  5 mL Oral TID PRN  
 [Held by provider] carvediloL (COREG) tablet 3.125 mg  3.125 mg Oral BID WITH MEALS  
 alcohol 62% (NOZIN) nasal  1 Ampule  1 Ampule Topical Q12H  
 nitroglycerin (NITROSTAT) tablet 0.4 mg  0.4 mg SubLINGual PRN  
 sodium chloride (NS) flush 5-40 mL  5-40 mL IntraVENous Q8H  
 sodium chloride (NS) flush 5-40 mL  5-40 mL IntraVENous PRN  
 acetaminophen (TYLENOL) tablet 650 mg  650 mg Oral Q6H PRN Or  
 acetaminophen (TYLENOL) suppository 650 mg  650 mg Rectal Q6H PRN  polyethylene glycol (MIRALAX) packet 17 g  17 g Oral DAILY PRN  promethazine (PHENERGAN) tablet 12.5 mg  12.5 mg Oral Q6H PRN Or  
 ondansetron (ZOFRAN) injection 4 mg  4 mg IntraVENous Q6H PRN  
 glucose chewable tablet 16 g  4 Tab Oral PRN  
 glucagon (GLUCAGEN) injection 1 mg  1 mg IntraMUSCular PRN  
 
 
 
 LAB AND IMAGING FINDINGS:  
 
Lab Results Component Value Date/Time WBC 27.7 (H) 04/22/2021 03:18 AM  
 HGB 8.2 (L) 04/22/2021 03:18 AM  
 PLATELET 808 67/93/4039 03:18 AM  
 
Lab Results Component Value Date/Time Sodium 130 (L) 04/22/2021 03:18 AM  
 Potassium 3.0 (L) 04/22/2021 03:18 AM  
 Chloride 100 04/22/2021 03:18 AM  
 CO2 18 (L) 04/22/2021 03:18 AM  
 BUN 45 (H) 04/22/2021 03:18 AM  
 Creatinine 2.76 (H) 04/22/2021 03:18 AM  
 Calcium 8.6 04/22/2021 03:18 AM  
 Magnesium 2.1 04/22/2021 03:18 AM  
 Phosphorus 5.1 (H) 04/22/2021 03:18 AM  
 Phosphorus 5.3 (H) 04/22/2021 03:18 AM  
  
Lab Results Component Value Date/Time Alk. phosphatase 88 04/22/2021 03:18 AM  
 Protein, total 4.7 (L) 04/22/2021 03:18 AM  
 Albumin 1.9 (L) 04/22/2021 03:18 AM  
 Albumin 1.9 (L) 04/22/2021 03:18 AM  
 Globulin 2.8 04/22/2021 03:18 AM  
 
Lab Results Component Value Date/Time INR 1.2 (H) 04/22/2021 03:18 AM  
 Prothrombin time 12.8 (H) 04/22/2021 03:18 AM  
 aPTT 40.3 (H) 04/14/2021 09:54 AM  
  
Lab Results Component Value Date/Time  Iron 10 (L) 12/23/2020 05:29 AM  
 TIBC 301 12/23/2020 05:29 AM  
 Iron % saturation 3 (L) 12/23/2020 05:29 AM  
 Ferritin 17 (L) 12/23/2020 05:29 AM  
  
Lab Results Component Value Date/Time pH 7.34 (L) 04/20/2021 07:36 PM  
 PCO2 32 (L) 04/20/2021 07:36 PM  
 PO2 155 (H) 04/20/2021 07:36 PM  
 
No components found for: [place] Lab Results Component Value Date/Time CK 4,609 (H) 05/14/2020 05:15 AM  
 CK - MB 1.3 04/09/2014 11:00 AM  
  
 
 
   
 
Total time:  45 
Counseling / coordination time, spent as noted above:30 
> 50% counseling / coordination?: y 
 
Prolonged service was provided for  []30 min   []75 min in face to face time in the presence of the patient, spent as noted above. Time Start:  
Time End:  
Note: this can only be billed with 30020 (initial) or 09030 (follow up). If multiple start / stop times, list each separately.

## 2021-04-23 NOTE — DISCHARGE SUMMARY
Admit date: 4/7/2021   Admitting Provider: Ashish Graff MD    Discharge date: 4/22/2021  Discharging Provider: Abbey Littlejohn NP      * Admission Diagnoses: Chest pain [R07.9]  NSTEMI (non-ST elevated myocardial infarction) [facility]) [I21.4]  CHF (congestive heart failure) ([facility] [I50.9]    * Discharge Diagnoses:  Cardiac arrest 2/2 decompensated heart failure  Hospital Problems as of 4/22/2021 Date Reviewed: 3/1/2021          Codes Class Noted - Resolved POA    Debility ICD-10-CM: R53.81  ICD-9-CM: 799.3  Unknown - Present Unknown        Lethargy ICD-10-CM: R53.83  ICD-9-CM: 780.79  Unknown - Present Unknown        Hypovolemic shock ([facility] ICD-10-CM: R57.1  ICD-9-CM: 785.59  Unknown - Present Unknown        Cardiogenic shock ([facility] ICD-10-CM: R57.0  ICD-9-CM: 785.51  Unknown - Present Unknown        DNR (do not resuscitate) discussion ICD-10-CM: Z71.89  ICD-9-CM: V65.49  Unknown - Present Unknown        Goals of care, counseling/discussion ICD-10-CM: Z71.89  ICD-9-CM: V65.49  Unknown - Present Unknown        Need for emotional support ICD-10-CM: R45.89  ICD-9-CM: 799.29  Unknown - Present Unknown        Shortness of breath ICD-10-CM: R06.02  ICD-9-CM: 786.05  Unknown - Present Unknown        S/P cardiac cath ICD-10-CM: Z98.890  ICD-9-CM: V45.89  4/8/2021 - Present Unknown    Overview Addendum 4/13/2021  2:57 PM by Abbi Oates NP     4/13/2021: s/p PCI/ADDIS to mid LAD x 1   4/8/2021: cardiac cath showed MVD.  Mod/severe MR, elevated PA pressures             CHF (congestive heart failure) ([facility]) ICD-10-CM: I50.9  ICD-9-CM: 428.0  4/7/2021 - Present Unknown        NSTEMI (non-ST elevated myocardial infarction) [facility]) ICD-10-CM: I21.4  ICD-9-CM: 410.70  4/7/2021 - Present Unknown        Chest pain ICD-10-CM: R07.9  ICD-9-CM: 786.50  11/23/2020 - Present Unknown              * Hospital Course:     64 yo female with a PMH of mitral regurgitation, CAD, HTN, HLD, paroxysmal atrial fibrillation/atrial flutter s/p AV node ablation, sick sinus syndrome s/p pacemaker, AV endocarditis, DM2, CKD4 (baseline Cr 2-2.2), neuropathy, L BKA, GERD, anemia, lymphedema, and gastric emma-en-y. As per chart review, she presented to [facility] as a transfer from [facility] on . At the OSH, she had been treated for chest pain, NSTEMI, and HF.  Troponin peaked at 54 and downtrended to 28. TTE from [facility] showed EF 10-15% (prior normal EF). Went for high risk PCI  and had LAD stent placed. She declined cardiac surgery. Her hospital stay has been complicated by arterial bleed in her right groin, decompensated HF, ARF, and transaminitis. It was determined that she is not a dialysis candidate. - extubated to [facility], DNR/DNI  - CXR with  L lung collapse 2/2 mucous plugging. Dr. Ba Calero and pt's spouse discussed [address], Mr. Rosa Garcia would not want her to have a bronchoscopy if she would have to be intubated, decision made to proceed with morphine infusion and discontinuation of BiPAP with focus on comfort measures only. In the evening pt became agonal with V-fib on the monitor. TOD 0846 pm.  Pt's spouse notified.       Procedure(s):  RIGHT GROIN EXPLORATION; CONTROL OF BLEEDING  Cardiology and IR Orders (From admission to next 24h)     Start     Ordered    --  CARDIAC CATHETERIZATION  [[id number]]      21 1351                    Discharge Exam:  Absent pulses, respirations, and blood pressure, pupils fixed    * Discharge Condition:       Discharge Medications:  Current Discharge Medication List              Follow-up Information    None         Signed:  Lalitha Mueller NP  2021  9:08 PM .

## 2021-04-23 NOTE — PROGRESS NOTES
Remained on unit to await [facility]' spouse to arrive at bedside. Assisted family coming from ER to CCU and processed grief at bedside. Offered prayer and affirmed family desire to ponder on next steps with regard to  planning and simply be in the presence of spouse. At this time, family is not certain about  home, but mentioned a desire for cremation and to be near [place] as it was where pt [facility] grew up and where most people knew her best. Consult with RN and maintained pastoral presence on unit. [address] [facility] Spiritual Care Provider  Paging Service [phone])

## 2021-04-23 NOTE — PROGRESS NOTES
Responded to bedside RN requesting  services following the death of pt Chris Mascorro on CCU. Pt spouse had recently left hospital. Consult with RN and NP. Provided pastoral presence on unit and moment of silence. Participated in phone call from NP to  and provided empathetic listening as pt expressed acute grief. Continued life review and processed existential questions regarding pt status in relation to [place] beliefs in afterlife. Discussed miscarriages in life and hoped pt would be reunited with them in heaven. Affirmed questions and God-image and assured of prayers. Guided spouse in next steps, upon request.  is on route to be at bedside. Offered prayer at bedside upon request and consulted with RN and Tech. Will remain on unit to provide follow up care as requested. [address] [facility] Spiritual Care Provider  Paging Service [phone])

## 2021-04-23 NOTE — PROGRESS NOTES
0800 - Patient on bipap, moves all extrem, responds to pain. Dobutamine drip titrated to 6mcg. Patient tolerating. Assessment complete. 1500  - Patient's family at bedside. Patient transitioned to partial comfort care. 1800 - Patient's dressing has drainage underneath, changed prn. End of Shift Note Bedside shift change report given to Yani Mohr (oncoming nurse) by Ion Palomino RN (offgoing nurse). Report included the following information SBAR, Kardex, Intake/Output, MAR, Recent Results and Med Rec Status Shift worked:  7a-7p Shift summary and any significant changes:  
  none Concerns for physician to address:  none Zone phone for oncoming shift:   n/a Activity: 
Activity Level: Bed Rest 
Number times ambulated in hallways past shift: 0 Number of times OOB to chair past shift: 0 Cardiac:  
Cardiac Monitoring: Yes     
Cardiac Rhythm: Paced, Premature ventricular contraction Access:  
Current line(s): central line and midline Genitourinary:  
Urinary status: buckley Respiratory:  
O2 Device: BIPAP Chronic home O2 use?: NO Incentive spirometer at bedside: NO 
  
GI: 
Last Bowel Movement Date: 04/22/21 Current diet:  No diet orders on file Passing flatus: YES Tolerating current diet: YES 
% Diet Eaten: 100 % Pain Management:  
Patient states pain is manageable on current regimen: YES Skin: 
Valdemar Score: 10 Interventions: turn team 
 
Patient Safety: 
Fall Score: Total Score: 3 Interventions: bed/chair alarm High Fall Risk: Yes Length of Stay: 
Expected LOS: 3d 21h Actual LOS: Kamila Kingston, RN

## 2021-04-23 NOTE — DEATH NOTE
Death Pronouncement Note Patient's Name: Avery Avila Patient's YOB: 1960 MRN Number: [id number] Admitting Provider: Boaz Mcneill MD 
Attending Provider: Kranthi Millard MD  
 
Patient was examined and the following were absent: Pulses, Blood Pressure and Respiratory effort I declared the patient dead on 4/22/2021 at 8:46 PM 
 
Preliminary Cause of Death: Decompensated heart failure ([facility] Electronically signed by Yolis Coronel NP on 4/22/2021 at 9:01 PM

## 2021-04-25 LAB
BACTERIA SPEC CULT: NORMAL
SERVICE CMNT-IMP: NORMAL

## 2021-04-26 LAB
BACTERIA SPEC CULT: NORMAL
SERVICE CMNT-IMP: NORMAL

## 2021-12-09 NOTE — WOUND CARE
01/25/19 0831 Wound Leg Lower Right;Lateral  
Date First Assessed/Time First Assessed: 11/14/18 0830   POA: Yes  Wound Type: Pressure injury  Location: Leg Lower  Orientation: Right;Lateral  
DRESSING STATUS Clean, dry, and intact DRESSING TYPE Unna boot 
(acticoat 7, ) Wound Length (cm) 6.5 cm Wound Width (cm) 3.8 cm Wound Depth (cm) 0.1 Wound Surface area (cm^2) 24.7 cm^2 Change in Wound Size % 57.12 Condition of Base [place];Granulation;Slough Condition of Edges Open Drainage Amount  Moderate Drainage Color Serosanguinous Wound Odor None Periwound Skin Condition Intact Cleansing and Cleansing Agents  Normal saline; Soap and water
OUTPATIENT WOUND CARE DISCHARGE NOTE 
 
 
 01/25/19 0915 Wound Leg Lower Right;Lateral  
Date First Assessed/Time First Assessed: 11/14/18 0830   POA: Yes  Wound Type: Pressure injury  Location: Leg Lower  Orientation: Right;Lateral  
Cleansing and Cleansing Agents  Normal saline Dressing Type Applied Silver products; Other (Comment);4 x 4;Absorptive; Unna boot 
(Acticoat flex 7,4x4, Phyliss [place].) Procedure Tolerated Well Wound Dressing Applied -Per order, as noted above Pain -  Denies pain Ambulatory Aid - None Accompanied by - Self Follow Up Appointments - 1 week Discharge Instructions Reviewed. Instructed to call The [address] [place] [address] Road with any questions or concerns. Patient Verbalizes understanding.
Yes

## 2022-11-30 NOTE — PATIENT INSTRUCTIONS
GI Preprocedure H/P      History Of Present Illness  Marilyn is a 48 year old female presenting with possible atypical reflux symptoms.    Past Medical History  Past Medical History:   Diagnosis Date   • Allergy to environmental factors    • Essential (primary) hypertension         Surgical History  Past Surgical History:   Procedure Laterality Date   • Open access colonoscopy N/A    • Tubal ligation          Allergies  ALLERGIES:  Patient has no known allergies.    Medications  Reviewed in EMR    Last Recorded Vitals  Visit Vitals  /88   Pulse 71   Temp 98.1 °F (36.7 °C) (Oral)   Resp 16   Ht 5' 6\" (1.676 m)   Wt 94 kg (207 lb 3.7 oz)   SpO2 100%   BMI 33.45 kg/m²       Physical Exam  General: NAD  HEENT: MMM  CV: RRR  Lungs: CTAB  Abd: soft, NT, ND, +BS  Ext: no c/c/e    Plan  EGD with Cruz Cornell MD  11/30/2022         Remember to get your flu shot in September or October. You may call us for a nurse appointment or get this from your pharmacy. Return for Protime/INR in   Keep appointments in November          Taking Warfarin Safely: Care Instructions  Your Care Instructions    Warfarin is a medicine that you take to prevent blood clots. It is often called a blood thinner. Doctors give warfarin (such as Coumadin) to reduce the risk of blood clots. You may be at risk for blood clots if you have atrial fibrillation or deep vein thrombosis. Some other health problems may also put you at risk. Warfarin slows the amount of time it takes for your blood to clot. It can cause bleeding problems. Even if you've been taking warfarin for a while, it's important to know how to take it safely. Foods and other medicines can affect the way warfarin works. Some can make warfarin work too well. This can cause bleeding problems. And some can make it work poorly, so that it does not prevent blood clots very well. You will need regular blood tests to check how long it takes for your blood to form a clot. This test is called a PT or prothrombin time test. The result of the test is called an INR level. Depending on the test results, your doctor or anticoagulation clinic may adjust your dose of warfarin. Follow-up care is a key part of your treatment and safety. Be sure to make and go to all appointments, and call your doctor if you are having problems. It's also a good idea to know your test results and keep a list of the medicines you take. How can you care for yourself at home? Take warfarin safely  · Take your warfarin at the same time each day. · If you miss a dose of warfarin, don't take an extra dose to make up for it. Your doctor can tell you exactly what to do so you don't take too much or too little. · Wear medical alert jewelry that lets others know that you take warfarin. You can buy this at most drugstores.   · Don't take warfarin if you are pregnant or planning to get pregnant. Talk to your doctor about how you can prevent getting pregnant while you are taking it. · Don't change your dose or stop taking warfarin unless your doctor tells you to. Effects of medicines and food on warfarin  · Don't start or stop taking any medicines, vitamins, or natural remedies unless you first talk to your doctor. Many medicines can affect how warfarin works. These include aspirin and other pain relievers, over-the-counter medicines, multivitamins, dietary supplements, and herbal products. · Tell all of your doctors and pharmacists that you take warfarin. Some prescription medicines can affect how warfarin works. · Keep the amount of vitamin K in your diet about the same from day to day. Do not suddenly eat a lot more or a lot less food that is rich in vitamin K than you usually do. Vitamin K affects how warfarin works and how your blood clots. Talk with your doctor before making big changes in your diet. Foods that have a lot of vitamin K include cooked green vegetables, such as:  ? Kale, spinach, turnip greens, jae greens, Swiss chard, and mustard greens. ? [place] sprouts, broccoli, and asparagus. · Limit your use of alcohol. Avoid bleeding by preventing falls and injuries  · Wear slippers or shoes with nonskid soles. · Remove throw rugs and clutter. · Rearrange furniture and electrical cords to keep them out of walking paths. · Keep stairways, porches, and outside walkways well lit. Use night-lights in hallways and bathrooms. · Be extra careful when you work with sharp tools or knives. When should you call for help? Call 911 anytime you think you may need emergency care. For example, call if:    · You have a sudden, severe headache that is different from past headaches.    Call your doctor now or seek immediate medical care if:    · You have any abnormal bleeding, such as:  ? Nosebleeds.   ? Vaginal bleeding that is different (heavier, more frequent, at a different time of the month) than what you are used to.  ? Bloody or black stools, or rectal bleeding. ? Bloody or pink urine.    Watch closely for changes in your health, and be sure to contact your doctor if you have any problems. Where can you learn more? Go to http://dona-windy.info/. Enter G133 in the search box to learn more about \"Taking Warfarin Safely: Care Instructions. \"  Current as of: July 22, 2018  Content Version: 12.1  © 5100-3385 [employer]. Care instructions adapted under license by [employer] (which disclaims liability or warranty for this information). If you have questions about a medical condition or this instruction, always ask your healthcare professional. [address] any warranty or liability for your use of this information.

## 2023-06-13 NOTE — ED TRIAGE NOTES
[facility] referrals sent. [facility] have declined.   Pt referred by [facility] for 4 day c/o chest pain and afib RVR. Arrives via EMS. Pt reports right sided chest pain and pain over right clavicle where pacemaker was removed in June. Pt reports she finished antibiotic in mid July. Pt adds that she has a hx of afib and is always in afib. Takes cardizem for afib daily. Took tylenol today at 0800 with no relief.

## 2025-05-19 NOTE — PROGRESS NOTES
Spiritual Care Assessment/Progress Note [facility] 
 
 
NAME: Hellen Chicas      MRN: [id number] AGE: 63 y.o. SEX: female Zoroastrianism Affiliation: [employer]  
Language: [place] 3/10/2021     Total Time (in minutes): 5 Spiritual Assessment begun in [facility] NEUROSCIENCE TELEMETRY through conversation with: 
  
    [x]Patient        [] Family    [] Friend(s) Reason for Consult: Initial/Spiritual assessment, patient floor Spiritual beliefs: (Please include comment if needed) 
   [] Identifies with a adriane tradition:     
   [] Supported by a adriane community:        
   [] Claims no spiritual orientation:       
   [] Seeking spiritual identity:            
   [] Adheres to an individual form of spirituality:       
   [x] Not able to assess:                   
 
    
Identified resources for coping:  
   [] Prayer                           
   [] Music                  [] Guided Imagery 
   [] Family/friends                 [] Pet visits [] Devotional reading                         [x] Unknown 
   [] Other:                                       
 
 
Interventions offered during this visit: (See comments for more details) Patient Interventions: Initial visit Plan of Care: 
 
 [x] Support spiritual and/or cultural needs  
 [] Support AMD and/or advance care planning process    
 [] Support grieving process 
 [] Coordinate Rites and/or Rituals  
 [] Coordination with community clergy [] No spiritual needs identified at this time 
 [] Detailed Plan of Care below (See Comments)  [] Make referral to Music Therapy 
[] Make referral to Pet Therapy    
[] Make referral to Addiction services 
[] Make referral to [facility] 
[] Make referral to Spiritual Care Partner 
[] No future visits requested       
[x] Follow up upon further referrals Comments:   Attempted visit on Neuro unit for spiritual assessment. No family/friends present.  Patient appeared to be resting soundly and did not awake when  knocked on door and called her name.  available upon referral by nurse or by patient request.   
  
BURAK Addison, [facility], Staff  [facility]  Paging Service  [phone]) "I am better during day and feel numb at night" "I am a little better"

## (undated) DEVICE — DRAIN SURG W10MMXL20CM SIL FULL PERF HUBLESS FLAT RADPQ

## (undated) DEVICE — PACEMAKER PACK: Brand: MEDLINE INDUSTRIES, INC.

## (undated) DEVICE — SUTURE VCRL SZ 2-0 L36IN ABSRB VLT L36MM CT-1 1/2 CIR J345H

## (undated) DEVICE — STERILE POLYISOPRENE POWDER-FREE SURGICAL GLOVES: Brand: PROTEXIS

## (undated) DEVICE — SYR 3ML LL TIP 1/10ML GRAD --

## (undated) DEVICE — GOWN,NON-REINFORCED,XXL: Brand: MEDLINE

## (undated) DEVICE — Device

## (undated) DEVICE — SURGICAL PROCEDURE PACK BASIN MAJ SET CUST NO CAUT

## (undated) DEVICE — SUTURE PERMAHAND SZ 2-0 L30IN NONABSORBABLE BLK SILK W/O A305H

## (undated) DEVICE — STAPLER SKIN H3.9MM WIRE DIA0.58MM CRWN 6.9MM 35 STPL ROT

## (undated) DEVICE — YANKAUER,BULB TIP,W/O VENT,RIGID,STERILE: Brand: MEDLINE

## (undated) DEVICE — 3M™ IOBAN™ 2 ANTIMICROBIAL INCISE DRAPE 6650EZ: Brand: IOBAN™ 2

## (undated) DEVICE — DRAIN SURG FLAT W7MMXL20CM FULL PERF

## (undated) DEVICE — SPLINT WR POS F/ARTERIAL ACC -- BX/10

## (undated) DEVICE — PRESSURE MONITORING SET: Brand: TRUWAVE

## (undated) DEVICE — BANDAGE,GAUZE,CONFORMING,3"X75",STRL,LF: Brand: MEDLINE

## (undated) DEVICE — Device: Brand: PADPRO

## (undated) DEVICE — KIT ANGIOGRAPHY CUST MRMC

## (undated) DEVICE — REM POLYHESIVE ADULT PATIENT RETURN ELECTRODE: Brand: VALLEYLAB

## (undated) DEVICE — DRESSING FOAM 4X6 DISP POSTOP MEPILEX BORD AG

## (undated) DEVICE — PINNACLE INTRODUCER SHEATH: Brand: PINNACLE

## (undated) DEVICE — TOWEL SURG W17XL27IN STD BLU COT NONFENESTRATED PREWASHED

## (undated) DEVICE — DRESSING PETRO W3XL8IN N ADH OIL EMUL GZ CURAD

## (undated) DEVICE — SUTURE PERMAHAND SZ 2-0 L30IN NONABSORBABLE BLK SH L26MM C016D

## (undated) DEVICE — STRAP,POSITIONING,KNEE/BODY,FOAM,4X60": Brand: MEDLINE

## (undated) DEVICE — LEAD PCMKR CAPSUR FIX NOVUS 58 --
Type: IMPLANTABLE DEVICE | Status: NON-FUNCTIONAL
Removed: 2020-04-27

## (undated) DEVICE — CATH GUID COR EB35 6FR 100CM -- LAUNCHER

## (undated) DEVICE — DRAPE,REIN 53X77,STERILE: Brand: MEDLINE

## (undated) DEVICE — PACK PROCEDURE SURG HRT CATH

## (undated) DEVICE — SUTURE COAT VCRL SZ 4-0 L18IN ABSRB UD L19MM PS-2 1/2 CIR J496G

## (undated) DEVICE — GARMENT,MEDLINE,DVT,INT,CALF,MED, GEN2: Brand: MEDLINE

## (undated) DEVICE — GOWN,SIRUS,FABRNF,XL,20/CS: Brand: MEDLINE

## (undated) DEVICE — SYRINGE ANGIO 10 CC BRL STD PRNT POLYCARB LT BLU MEDALLION

## (undated) DEVICE — DRAPE PRB US TRNSDCR 6X96IN --

## (undated) DEVICE — CATHETER ETER ANGIO L110CM OD5FR ID046IN L75CM 038IN 145DEG CARD

## (undated) DEVICE — MAGNETIC INSTR DRAPE 20X16: Brand: MEDLINE INDUSTRIES, INC.

## (undated) DEVICE — DERMABOND SKIN ADH 0.7ML -- DERMABOND ADVANCED 12/BX

## (undated) DEVICE — Device: Brand: TIGHTRAIL ROTATING DILATOR SHEATH

## (undated) DEVICE — SURGIFOAM SPNG SZ 100

## (undated) DEVICE — TRAY PREP DRY W/ PREM GLV 2 APPL 6 SPNG 2 UNDPD 1 OVERWRAP

## (undated) DEVICE — SUTURE PERMAHAND SZ 3-0 L18IN NONABSORBABLE BLK L26MM SH C013D

## (undated) DEVICE — GOWN,SIRUS,NONRNF,SETINSLV,XL,20/CS: Brand: MEDLINE

## (undated) DEVICE — DRAPE SHT 3 QTR PROXIMA 53X77 --

## (undated) DEVICE — Z DUPLICATE USE 2526246 STOPCOCK IV R 3 W HI PRSS

## (undated) DEVICE — NEEDLE HYPO 22GA L1.5IN BLK POLYPR HUB S STL REG BVL STR

## (undated) DEVICE — PREP SKN CHLRAPRP APL 26ML STR --

## (undated) DEVICE — ROCKER SWITCH PENCIL BLADE ELECTRODE, HOLSTER: Brand: EDGE

## (undated) DEVICE — SUT PROL 4-0 36IN RB1 DA BLU --

## (undated) DEVICE — BLADE ELECTRODE: Brand: EDGE

## (undated) DEVICE — TUBING HYDR IRR --

## (undated) DEVICE — TUBING PRSS MON L6IN PVC M FEM CONN

## (undated) DEVICE — LABEL MED VASC MRMC STRL

## (undated) DEVICE — HANDLE LT SNAP ON ULT DURABLE LENS FOR TRUMPF ALC DISPOSABLE

## (undated) DEVICE — DRESSING HEMOSTATIC SFT INTVENT W/O SLT DBL WRP QUIKCLOT LF

## (undated) DEVICE — SUT SLK 0 18IN TIE MP BLK --

## (undated) DEVICE — KIT,1200CC CANISTER,3/16"X6' TUBING: Brand: MEDLINE INDUSTRIES, INC.

## (undated) DEVICE — NEEDLE HYPO 25GA L1.5IN BVL ORIENTED ECLIPSE

## (undated) DEVICE — GOWN,PREVENTION PLUS,XLN/2XL,ST,22/CS: Brand: MEDLINE

## (undated) DEVICE — PAD,NON-ADHERENT,W/AD,3X4,ST,LF,1/PK: Brand: MEDLINE

## (undated) DEVICE — TIGHTRAIL MINI ROTATING DILATOR SHEATH: Brand: TIGHTRAIL MINI

## (undated) DEVICE — BANDAGE COBAN 4 IN COMPR W4INXL5YD FOAM COHESIVE QUIK STK SELF ADH SFT

## (undated) DEVICE — (D)STRIP SKN CLSR 0.5X4IN WHT --

## (undated) DEVICE — BOWL UTIL GRAD 32OZ STRL --

## (undated) DEVICE — SWAB CULT DBL W/O CHAR RAYON TIP AMIES GEL CLMN FOR COLL

## (undated) DEVICE — SUTURE ETHLN SZ 2-0 L30IN NONABSORBABLE BLK L36MM PSLX 3/8 1697H

## (undated) DEVICE — CATHETER EP 6FR L92CM 2-5-2MM SPC TIP 1MM 4 ELECTRD D CRV

## (undated) DEVICE — LIMB HOLDER, WRIST/ANKLE: Brand: DEROYAL

## (undated) DEVICE — SYR POWER 150ML 8IN FILL TUBE --

## (undated) DEVICE — SUT ETHLN 3-0 18IN PS2 BLK --

## (undated) DEVICE — 450 ML BOTTLE OF 0.05% CHLORHEXIDINE GLUCONATE IN 99.95% STERILE WATER FOR IRRIGATION, USP AND APPLICATOR.: Brand: IRRISEPT ANTIMICROBIAL WOUND LAVAGE

## (undated) DEVICE — INFECTION CONTROL KIT SYS

## (undated) DEVICE — SOL INJ SOD CL 0.9% 500ML BG --

## (undated) DEVICE — SYR 10ML LUER LOK 1/5ML GRAD --

## (undated) DEVICE — CATHETER DIAG 6FR L110CM INTRO 6FR BLLN DIA9MM 1CC PULM ART

## (undated) DEVICE — AEGIS 1" DISK 4MM HOLE, PEEL OPEN: Brand: MEDLINE

## (undated) DEVICE — SOLUTION IV 1000ML 0.9% SOD CHL

## (undated) DEVICE — IRRIGATION KT PIST SYR 60ML -- CONVERT TO ITEM 116415

## (undated) DEVICE — NEEDLE HYPO 18GA L1.5IN PNK S STL HUB POLYPR SHLD REG BVL

## (undated) DEVICE — RESERVOIR,SUCTION,100CC,SILICONE: Brand: MEDLINE

## (undated) DEVICE — PLASMABLADE PS200-040 4.0: Brand: PLASMABLADE™

## (undated) DEVICE — SPONGE GZ W4XL4IN COT 12 PLY TYP VII WVN C FLD DSGN

## (undated) DEVICE — DRAPE,CHEST,FENES,15X10,STERIL: Brand: MEDLINE

## (undated) DEVICE — SUTURE PROL SZ 5-0 L24IN NONABSORBABLE BLU RB-2 L13IN 1/2 8554H

## (undated) DEVICE — GUIDEWIRE VASC L260CM 0.035IN J TIP L3MM PTFE FIX COR NAMIC

## (undated) DEVICE — SWAB CULT LIQ STUART AGR AERB MOD IN BRK SGL RAYON TIP PLAS 220099] BECTON DICKINSON MICRO]

## (undated) DEVICE — DRAPE,EXTREMITY,89X128,STERILE: Brand: MEDLINE

## (undated) DEVICE — CATHETER ETER CARD MULTIPAK MULTIPAK 5FR PERFORMA

## (undated) DEVICE — CABLE RMFG EP MAP NAVISTAR RED -- F/CARTO 3 SYS - OEM 323590

## (undated) DEVICE — BANDAGE,GAUZE,BULKEE II,4.5"X4.1YD,STRL: Brand: MEDLINE

## (undated) DEVICE — SUTURE PERMAHAND SZ 3-0 L30IN NONABSORBABLE BLK SILK BRAID A304H

## (undated) DEVICE — SUTURE MCRYL SZ 4-0 L27IN ABSRB UD L19MM PS-2 1/2 CIR PRIM Y426H

## (undated) DEVICE — TRAY,IRRIGATION,PISTON SYRINGE,60ML,STRL: Brand: MEDLINE

## (undated) DEVICE — (D)PREP SKN CHLRAPRP APPL 26ML -- CONVERT TO ITEM 371833

## (undated) DEVICE — STERILE POLYISOPRENE POWDER-FREE SURGICAL GLOVES WITH EMOLLIENT COATING: Brand: PROTEXIS

## (undated) DEVICE — PACK,ORTOHMAX/CVMAX,UNIVERSAL,5/CS: Brand: MEDLINE

## (undated) DEVICE — HI-TORQUE VERSACORE FLOPPY GUIDE WIRE SYSTEM 145 CM: Brand: HI-TORQUE VERSACORE

## (undated) DEVICE — NEEDLE HYPO 22GA L1.5IN BLK S STL HUB POLYPR SHLD REG BVL

## (undated) DEVICE — CANISTER WND VAC ASST CLSR --

## (undated) DEVICE — Device: Brand: LLD EZ LEAD LOCKING DEVICE

## (undated) DEVICE — SUTURE VCRL SZ 2-0 L27IN ABSRB UD L26MM SH 1/2 CIR J417H

## (undated) DEVICE — SUTURE VCRL SZ 2-0 L36IN ABSRB UD L40MM CT 1/2 CIR J957H

## (undated) DEVICE — ROCKER SWITCH PENCIL HOLSTER: Brand: VALLEYLAB

## (undated) DEVICE — SUTURE VCRL SZ 0 L27IN ABSRB UD L36MM CT-1 1/2 CIR J260H

## (undated) DEVICE — COVER,TABLE,60X90,STERILE: Brand: MEDLINE

## (undated) DEVICE — MASTISOL ADHESIVE LIQ 2/3ML

## (undated) DEVICE — SUTURE ABSORBABLE BRAIDED 2-0 CT-1 27 IN UD VICRYL J259H

## (undated) DEVICE — GLIDESHEATH SLENDER ACCESS KIT: Brand: GLIDESHEATH SLENDER

## (undated) DEVICE — CLIP LIG ADH PD W SLOT HORZ --

## (undated) DEVICE — CATH NAVISTAR BIDIR FJ 8MM --

## (undated) DEVICE — 3M™ TEGADERM™ TRANSPARENT FILM DRESSING FRAME STYLE, 1626W, 4 IN X 4-3/4 IN (10 CM X 12 CM), 50/CT 4CT/CASE: Brand: 3M™ TEGADERM™

## (undated) DEVICE — SUTURE VCRL SZ 3-0 L27IN ABSRB UD L26MM SH 1/2 CIR J416H

## (undated) DEVICE — PREP CHLORAPREP 10.5 ML ORG --

## (undated) DEVICE — TR BAND RADIAL ARTERY COMPRESSION DEVICE: Brand: TR BAND

## (undated) DEVICE — RUNTHROUGH NS EXTRA FLOPPY PTCA GUIDEWIRE: Brand: RUNTHROUGH

## (undated) DEVICE — SET IRRIG TB DISP FOR BONESCALPEL

## (undated) DEVICE — MEDI-TRACE CADENCE ADULT, DEFIBRILLATION ELECTRODE -RTS  (10 PR/PK) - PHILIPS: Brand: MEDI-TRACE CADENCE

## (undated) DEVICE — RADIFOCUS OPTITORQUE ANGIOGRAPHIC CATHETER: Brand: OPTITORQUE

## (undated) DEVICE — TREK CORONARY DILATATION CATHETER 2.50 MM X 12 MM / RAPID-EXCHANGE: Brand: TREK

## (undated) DEVICE — AMPLATZ EXTRA STIFF WIRE GUIDE: Brand: AMPLATZ

## (undated) DEVICE — CATH GUID .056IN 6FR 150CM -- GUIDELINER V3

## (undated) DEVICE — FASCIAL DILATOR SET: Brand: COOK

## (undated) DEVICE — SPONGE LAP 18X18IN STRL -- 5/PK

## (undated) DEVICE — GAUZE,SPONGE,FLUFF,6"X6.75",STRL,5/TRAY: Brand: MEDLINE

## (undated) DEVICE — SUTURE VCRL SZ 4-0 L27IN ABSRB UD L19MM FS-2 3/8 CIR REV J422H

## (undated) DEVICE — PERCLOSE PROGLIDE™ SUTURE-MEDIATED CLOSURE SYSTEM: Brand: PERCLOSE PROGLIDE™

## (undated) DEVICE — LEAD PCMKR CAPSUR FIX NOVUS 52 --
Type: IMPLANTABLE DEVICE | Status: NON-FUNCTIONAL
Removed: 2020-04-27

## (undated) DEVICE — PACK,BASIC,SIRUS,V: Brand: MEDLINE

## (undated) DEVICE — SWAN-GANZ TRUE SIZE THERMODULTION CATHETER, 5F: Brand: SWAN-GANZ TRUE SIZE

## (undated) DEVICE — SPONGE DRAIN NONWOVEN 4X4IN -- 2/PK

## (undated) DEVICE — HI-TORQUE SPARTACORE 14 PERIPHERAL GUIDE WIRE .014 5.0 CM X 190 CM: Brand: SPARTACORE

## (undated) DEVICE — INTRO SHTH CATH 23F 55.7CM --

## (undated) DEVICE — SUT CHRMC 0 54IN REEL BRN --

## (undated) DEVICE — STOCKINETTE: Brand: DEROYAL

## (undated) DEVICE — ZIMMER® STERILE DISPOSABLE TOURNIQUET CUFF WITH PROTECTIVE SLEEVE AND PLC, DUAL PORT, SINGLE BLADDER, 18 IN. (46 CM)

## (undated) DEVICE — SUTURE PROL SZ 4-0 L36IN NONABSORBABLE BLU L26MM SH 1/2 CIR 8521H

## (undated) DEVICE — PAD,ABDOMINAL,5"X9",ST,LF,25/BX: Brand: MEDLINE INDUSTRIES, INC.

## (undated) DEVICE — SYRINGE ANGIO CNTRST DEL 20 CC POLYCARB LIGHT GRN MEDALLION

## (undated) DEVICE — PACEMAKER CARD W47.5XH44.7MM D7.5MM GENRTR PERM 2 CHMBR
Type: IMPLANTABLE DEVICE | Status: NON-FUNCTIONAL
Removed: 2020-04-27

## (undated) DEVICE — CABLE CATH L10FT BLU CONN 10-34 PIN ELECTROGRAM CONDUCTION

## (undated) DEVICE — 2108 SERIES SAGITTAL BLADE (24.8 X 0.88 X 73.8MM)

## (undated) DEVICE — EXTREMITY III-LF: Brand: MEDLINE INDUSTRIES, INC.

## (undated) DEVICE — SLEEVE PRGM HD CVR PACE STRL --

## (undated) DEVICE — SUT SLK 0 30IN SH BLK --

## (undated) DEVICE — BAG RED 3PLY 2MIL 30X40 IN

## (undated) DEVICE — SOL IRRIGATION INJ NACL 0.9% 500ML BTL

## (undated) DEVICE — SYRINGE KIT,PACKAGED,,150FT,MK 7(ANGIO-ARTERION, 150ML SYR KIT W/QFT,MC)(60729385): Brand: MEDRAD® MARK 7 ARTERION DISPOSABLE SYRINGE 150 ML WITH QUICK FILL TUBE

## (undated) DEVICE — SUTURE PDS II SZ 0 L36IN ABSRB VLT L36MM CT-1 1/2 CIR Z346H